# Patient Record
Sex: FEMALE | Race: WHITE | NOT HISPANIC OR LATINO | Employment: OTHER | URBAN - METROPOLITAN AREA
[De-identification: names, ages, dates, MRNs, and addresses within clinical notes are randomized per-mention and may not be internally consistent; named-entity substitution may affect disease eponyms.]

---

## 2017-01-12 ENCOUNTER — TELEPHONE (OUTPATIENT)
Dept: FAMILY MEDICINE | Facility: CLINIC | Age: 77
End: 2017-01-12

## 2017-01-12 NOTE — TELEPHONE ENCOUNTER
Reason for Call:  Form, our goal is to have forms completed with 72 hours, however, some forms may require a visit or additional information.    Type of letter, form or note:  medical    Who is the form from?: [facility]    Where did the form come from: form was faxed in    What clinic location was the form placed at?: [facility]    Where the form was placed: 's Box: Bethanie Finnegan MD    What number is listed as a contact on the form?: Fax # [phone]       Additional comments: Mohinder Meredith (Initial Date: 1/11/17)    Call taken on 1/12/2017 at 9:24 AM by Gonzalez Vidales

## 2017-01-12 NOTE — TELEPHONE ENCOUNTER
Form reviewed by Bethanie Finnegan MD and per her request the patient is overdue for an office visit and she needs to schedule an appointment   Call to patient to notify her of this information and she noted that her current  PCP is Ananya Mclean, with [facility]  Advised patient to contact [facility] to update them with her current PCP and the form was faxed back to [facility] with this updated information as well

## 2017-01-23 NOTE — TELEPHONE ENCOUNTER
divalproex (DEPAKOTE ER) 500 MG 24 hr tablet  Last Written Prescription Date: 8/8/16  Last Fill Quantity: 180, # refills: 0  Last Office Visit with [facility], [facility] or [facility] prescribing provider: 5/2/16    Routing to PCP-she is overdue for 6 months visit for Seizure  Mara Yung RN- Triage [employer]         ALT       44   4/12/2016  WBC      8.9   4/12/2016  RBC     3.99   4/12/2016  HGB     10.4   4/12/2016  HCT     36.0   4/12/2016  No components found with this name: mct  MCV       90   4/12/2016  MCH     26.1   4/12/2016  MCHC     28.9   4/12/2016  RDW     15.8   4/12/2016  PLT      287   4/12/2016  TSH   Date Value Ref Range Status   09/06/2016 1.86 0.40 - 5.00 mU/L Final     CREATININE   Date Value Ref Range Status   04/12/2016 0.83 0.52 - 1.04 mg/dL Final       Drug Levels  Depakote:   Results for orders placed or performed in visit on 09/06/16   Valproic acid   Result Value Ref Range    Valproic Acid Level 64 50 - 100 mg/L     Dilantin: No results found for this or any previous visit.  Gabitril: No results found for this or any previous visit.  Tegretol: No results found for this or any previous visit.  Zonegran: No results found for this or any previous visit.

## 2017-01-24 DIAGNOSIS — E11.21 TYPE 2 DIABETES MELLITUS WITH DIABETIC NEPHROPATHY (H): Primary | ICD-10-CM

## 2017-01-24 RX ORDER — DIVALPROEX SODIUM 500 MG/1
TABLET, EXTENDED RELEASE ORAL
Qty: 180 TABLET | Refills: 0
Start: 2017-01-24

## 2017-01-24 NOTE — TELEPHONE ENCOUNTER
Test strips      Last Written Prescription Date: 8/25/2015  Last Fill Quantity: 200,  # refills: 3   Last Office Visit with [facility], [facility] or [facility] prescribing provider: 05/02/2016      30 day supply given.  Due for 6 month DM follow up  Autumn Ortez RN  Triage [employer]

## 2017-02-14 ENCOUNTER — OFFICE VISIT (OUTPATIENT)
Dept: FAMILY MEDICINE | Facility: CLINIC | Age: 77
End: 2017-02-14
Payer: MEDICARE

## 2017-02-14 VITALS
DIASTOLIC BLOOD PRESSURE: 62 MMHG | HEIGHT: 63 IN | TEMPERATURE: 96.9 F | OXYGEN SATURATION: 93 % | WEIGHT: 202 LBS | SYSTOLIC BLOOD PRESSURE: 114 MMHG | BODY MASS INDEX: 35.79 KG/M2 | RESPIRATION RATE: 12 BRPM | HEART RATE: 95 BPM

## 2017-02-14 DIAGNOSIS — E11.21 TYPE 2 DIABETES MELLITUS WITH DIABETIC NEPHROPATHY, WITHOUT LONG-TERM CURRENT USE OF INSULIN (H): ICD-10-CM

## 2017-02-14 DIAGNOSIS — E78.2 MIXED HYPERLIPIDEMIA: ICD-10-CM

## 2017-02-14 DIAGNOSIS — F31.76 BIPOLAR DISORDER, IN FULL REMISSION, MOST RECENT EPISODE DEPRESSED (H): Chronic | ICD-10-CM

## 2017-02-14 DIAGNOSIS — M62.81 MUSCLE WEAKNESS (GENERALIZED): Primary | ICD-10-CM

## 2017-02-14 DIAGNOSIS — Z13.89 SCREENING FOR DIABETIC PERIPHERAL NEUROPATHY: ICD-10-CM

## 2017-02-14 DIAGNOSIS — Z79.899 ENCOUNTER FOR LONG-TERM (CURRENT) USE OF MEDICATIONS: ICD-10-CM

## 2017-02-14 DIAGNOSIS — E66.09 NON MORBID OBESITY DUE TO EXCESS CALORIES: ICD-10-CM

## 2017-02-14 DIAGNOSIS — J41.1 MUCOPURULENT CHRONIC BRONCHITIS (H): ICD-10-CM

## 2017-02-14 DIAGNOSIS — D50.0 IRON DEFICIENCY ANEMIA DUE TO CHRONIC BLOOD LOSS: ICD-10-CM

## 2017-02-14 DIAGNOSIS — E55.9 VITAMIN D DEFICIENCY: ICD-10-CM

## 2017-02-14 DIAGNOSIS — N18.30 CKD (CHRONIC KIDNEY DISEASE) STAGE 3, GFR 30-59 ML/MIN (H): ICD-10-CM

## 2017-02-14 DIAGNOSIS — F32.5 MAJOR DEPRESSION IN COMPLETE REMISSION (H): ICD-10-CM

## 2017-02-14 DIAGNOSIS — R74.8 ELEVATED LIVER ENZYMES: ICD-10-CM

## 2017-02-14 DIAGNOSIS — Z79.52 STEROID DEPENDENT FOR ADRENAL SUPRESSION: ICD-10-CM

## 2017-02-14 LAB
HBA1C MFR BLD: 6 % (ref 4.3–6)
HGB BLD-MCNC: 11.4 G/DL (ref 11.7–15.7)

## 2017-02-14 PROCEDURE — 82043 UR ALBUMIN QUANTITATIVE: CPT | Performed by: FAMILY MEDICINE

## 2017-02-14 PROCEDURE — 85018 HEMOGLOBIN: CPT | Performed by: FAMILY MEDICINE

## 2017-02-14 PROCEDURE — 80053 COMPREHEN METABOLIC PANEL: CPT | Performed by: FAMILY MEDICINE

## 2017-02-14 PROCEDURE — 36415 COLL VENOUS BLD VENIPUNCTURE: CPT | Performed by: FAMILY MEDICINE

## 2017-02-14 PROCEDURE — 82306 VITAMIN D 25 HYDROXY: CPT | Performed by: FAMILY MEDICINE

## 2017-02-14 PROCEDURE — 99215 OFFICE O/P EST HI 40 MIN: CPT | Performed by: FAMILY MEDICINE

## 2017-02-14 PROCEDURE — 83036 HEMOGLOBIN GLYCOSYLATED A1C: CPT | Performed by: FAMILY MEDICINE

## 2017-02-14 PROCEDURE — 99207 C FOOT EXAM  NO CHARGE: CPT | Performed by: FAMILY MEDICINE

## 2017-02-14 RX ORDER — PREDNISONE 5 MG/1
TABLET ORAL
Qty: 60 TABLET | Refills: 0 | Status: ON HOLD | OUTPATIENT
Start: 2017-02-14 | End: 2021-07-20

## 2017-02-14 NOTE — LETTER
My COPD Action Plan   Name: Tonya Yang    YOB: 1940    Date: 2/14/2017    My doctor: No primary care provider on file.    My clinic: [address] [id number] [phone]   My COPD Medicine:       My Controller Medications: Albuterol (Proair/Ventolin/Proventolin)     My Rescue Medication:  Albuterol     Use of Oxygen:  Oxygen Not Prescribed   My COPD Severity: Mild = FeV1 > 80%        GREEN ZONE       Doing well today      Usual level of activity and exercise    Usual amount of cough and mucus    No shortness of breath    Can think clearly    Sleep well at night    Feel like eating Actions:      Take daily medicines    Use oxygen as prescribed    Follow regular exercise and diet plan    Avoid cigarette smoke and other irritants that harm the lungs             YELLOW ZONE          Having a bad day or flare up      Short of breath more than usual    Change in color or amount of mucus    More coughing or wheezing    Fever or chills    Less energy; trouble completing activities    Trouble thinking or focusing    Using quick relief inhaler or nebulizer more often    Poor sleep; symptoms wake me up    Do not feel like eating Actions:      Take daily medicines    Use quick relief inhaler every 4 hours    If you use oxygen, call you doctor to see if you should adjust your oxygen    Do breathing exercises or other things to help you relax    Let a loved one, friend or neighbor know you are feeling worse    If you have one or more symptoms, consider taking steroids or antibiotics (if they were prescribed)    Call your care team if you have 2 or more symptoms or symptoms don't improve           RED ZONE       Need medical care now      Severe shortness of breath (feel you can't breath)    Not enough breath to do any activity    Trouble walking or talking    Trouble coughing up mucus or blood in mucus    Frequent  coughing    Rescue medicines are not working    Not able to sleep because of breathing    Feel confused or drowsy    Chest pain  Actions:      Call 911 or     Have someone take you to the emergency room if you can't reach your care team        Electronically signed by: Bethanie Finnegan, February 14, 2017     Annual Reminders:  Meet with Care Team, Flu Shot every Fall and Pneumonia Shot at least once.    [facility]/PHARMACY #[id number] - [place], [place] - [address]

## 2017-02-14 NOTE — MR AVS SNAPSHOT
After Visit Summary   2/14/2017    Tonya Yang    MRN: [id number]           Patient Information     Date Of Birth          1940        Visit Information        Provider Department      2/14/2017 2:20 PM Bethanie Finnegan MD [facility]        Today's Diagnoses     Mucopurulent chronic bronchitis (HCC): spirometry 7-18-13  FEV=79; FEV_FVC=65%    -  1    Major depression in complete remission (HCC) on meds         CKD (chronic kidney disease) stage 3, GFR 30-59 ml/min        Bipolar disorder, in full remission, most recent episode depressed (H)        Iron deficiency anemia due to chronic blood loss        Elevated liver enzymes since 12-13 liver abscess I&D        Type 2 diabetes mellitus with diabetic nephropathy, without long-term current use of insulin (H)        Steroid dependent for cochlear hydrops  since 1985         Mixed hyperlipidemia        Non morbid obesity due to excess calories w comorbid ,HTN, CKD, DM         Screening for diabetic peripheral neuropathy        Muscle weakness (generalized)        Vitamin D deficiency        Encounter for long-term (current) use of medications        Meniere's disease (cochlear hydrops), bilateral          Care Instructions    1. TODAY YOU  Went up from 1 lmgm to 10mgm of prednisone    you;'ve been on > 5mgm prednisone since 1990 and over the last 4-5 mo have gone down to 1mgm  And then you felt so sick and weak you took 10mgm this am     Take 5mgm a day  I will give you a mo worth and we will see if we can find out why you are so weak    2. No adrenal otc vitamins  Hold as was recommended by Ananya     4. Go ahead with the B 12 vitamin      5. Continue : Your vitamin D is normal.  Please take 1,000 units in the summer and 2,000 units in the winter to maintain it     6. See me next week re f/u labs     7  You may need a slower taper   ie stay on each dose of steroid for 2 mo              Follow-ups after  "your visit        Follow-up notes from your care team     Return in about 1 week (around 2017) for Lab F/U .      Who to contact     If you have questions or need follow up information about today's clinic visit or your schedule please contact [facility] directly at [phone].  Normal or non-critical lab and imaging results will be communicated to you by MyChart, letter or phone within 4 business days after the clinic has received the results. If you do not hear from us within 7 days, please contact the clinic through [employer] or phone. If you have a critical or abnormal lab result, we will notify you by phone as soon as possible.  Submit refill requests through [employer] or call your pharmacy and they will forward the refill request to us. Please allow 3 business days for your refill to be completed.          Additional Information About Your Visit        MyChart Information     [employer] lets you send messages to your doctor, view your test results, renew your prescriptions, schedule appointments and more. To sign up, go to [email or web address] . Click on \"Log in\" on the left side of the screen, which will take you to the Welcome page. Then click on \"Sign up Now\" on the right side of the page.     You will be asked to enter the access code listed below, as well as some personal information. Please follow the directions to create your username and password.     Your access code is: [email or web address]  Expires: 5/15/2017  3:40 PM     Your access code will  in 90 days. If you need help or a new code, please call your [place] clinic or [phone].        Care EveryWhere ID     This is your Care EveryWhere ID. This could be used by other organizations to access your [place] medical records  [id number]        Your Vitals Were     Pulse Temperature Respirations Height Last Period Pulse Oximetry    95 96.9  F (36.1  C) (Tympanic) 12 5' 3\" (1.6 m) (LMP Unknown) 93%    " Breastfeeding? BMI (Body Mass Index)                No 35.78 kg/m2           Blood Pressure from Last 3 Encounters:   02/14/17 114/62   05/02/16 130/68   04/25/16 130/62    Weight from Last 3 Encounters:   02/14/17 202 lb (91.6 kg)   05/02/16 204 lb (92.5 kg)   04/25/16 204 lb (92.5 kg)              We Performed the Following     Albumin Random Urine Quantitative     Comprehensive metabolic panel     COPD ACTION PLAN     DEPRESSION ACTION PLAN (DAP) Order [[id number]]     FOOT EXAM  NO CHARGE [91630.114]     HEMOGLOBIN A1C     Hemoglobin     Vitamin D Deficiency          Where to get your medicines      These medications were sent to [facility]/pharmacy #[id number] - [place], [place] - [address]  [address], [place] [address]     Phone:  [phone]     predniSONE 5 MG tablet          Primary Care Provider    None Specified       No primary provider on file.        Thank you!     Thank you for choosing [facility]  for your care. Our goal is always to provide you with excellent care. Hearing back from our patients is one way we can continue to improve our services. Please take a few minutes to complete the written survey that you may receive in the mail after your visit with us. Thank you!             Your Updated Medication List - Protect others around you: Learn how to safely use, store and throw away your medicines at www.disposemymeds.org.          This list is accurate as of: 2/14/17  3:40 PM.  Always use your most recent med list.                   Brand Name Dispense Instructions for use    ACE/ARB NOT PRESCRIBED (INTENTIONAL)      1 each continuous prn ACE & ARB not prescribed due to CKD (Chronic Kidney Disease)       ADRENAL PO          atorvastatin 20 MG tablet    LIPITOR    90 tablet    TAKE 1 TABLET (20 MG) BY MOUTH DAILY       DUANE CONTOUR test strip   Generic drug:  blood glucose monitoring     200 strip    USE TO TEST TWICE DAILY       diphenoxylate-atropine 2.5-0.025 MG  per tablet    LOMOTIL    180 tablet    Take 2 tablets by mouth 4 times daily as needed for diarrhea       * divalproex 500 MG 24 hr tablet    DEPAKOTE ER    180 tablet    TAKE 2 TABLETS BY MOUTH DAILY       * divalproex 500 MG 24 hr tablet    DEPAKOTE ER    180 tablet    TAKE 2 TABLETS BY MOUTH DAILY       DULoxetine 60 MG EC capsule    CYMBALTA    90 capsule    TAKE 1 CAPSULE BY MOUTH DAILY       furosemide 20 MG tablet    LASIX    1 tablet    Take 1 tablet (20 mg) by mouth once for 1 dose       glipiZIDE 10 MG tablet    GLUCOTROL    180 tablet    TAKE 1 TABLET (10 MG) BY MOUTH 2 TIMES DAILY (BEFORE MEALS)       * metFORMIN 500 MG 24 hr tablet    GLUCOPHAGE-XR    360 tablet    TAKE 4 TABLETS (2,000 MG) BY MOUTH DAILY (WITH DINNER)       * metFORMIN 500 MG 24 hr tablet    GLUCOPHAGE-XR    360 tablet    TAKE 2 TABLETS BY MOUTH TWICE DAILY WITH MEALS       METHYL B-12 PO      Take by mouth daily as needed       OMEGA-3 FISH OIL PO      Take 1 g by mouth 2 times daily (with meals)       omeprazole 20 MG CR capsule    priLOSEC    180 capsule    TAKE 1 CAPSULE BY MOUTH TWICE DAILY       order for DME     1 each    Equipment being ordered: Long size Lumbar Roll to use when sitting  Fax: [phone]       oxyCODONE-acetaminophen 5-325 MG per tablet    PERCOCET    7 tablet    Take 1 tablet by mouth every 6 hours as needed for moderate to severe pain       * predniSONE 10 MG tablet    DELTASONE    90 tablet    TAKE 1 TABLET BY MOUTH EVERY DAY       * predniSONE 1 MG tablet    DELTASONE    14 tablet    Take 2 tabs(total of 2mg)daily x 7 days       * predniSONE 10 MG tablet    DELTASONE    30 tablet    Take 1 tablet (10 mg) by mouth daily       * predniSONE 5 MG tablet    DELTASONE    60 tablet    Take 1 tab daily for 7 days,       PROBIOTIC & ACIDOPHILUS EX ST PO      Take 1 tablet by mouth One tablet daily       triamterene-hydrochlorothiazide 37.5-25 MG per capsule    DYAZIDE    90 capsule    TAKE 1 CAPSULE BY MOUTH DAILY        VALIUM PO      Take 5 mg by mouth At Bedtime       vitamin D 2000 UNITS Caps      Take 1 tablet by mouth One tablet daily       * Notice:  This list has 8 medication(s) that are the same as other medications prescribed for you. Read the directions carefully, and ask your doctor or other care provider to review them with you.

## 2017-02-14 NOTE — LETTER
My Depression Action Plan  Name: Tonya Yang   Date of Birth 1940  Date: 2/14/2017    My doctor: No primary care provider on file.   My clinic: [address] [address]  [phone]          GREEN    ZONE   Good Control    What it looks like:     Things are going generally well. You have normal up s and down s. You may even feel depressed from time to time, but bad moods usually last less than a day.   What you need to do:  1. Continue to care for yourself (see self care plan)  2. Check your depression survival kit and update it as needed  3. Follow your physician s recommendations including any medication.  4. Do not stop taking medication unless you consult with your physician first.           YELLOW         ZONE Getting Worse    What it looks like:     Depression is starting to interfere with your life.     It may be hard to get out of bed; you may be starting to isolate yourself from others.    Symptoms of depression are starting to last most all day and this has happened for several days.     You may have suicidal thoughts but they are not constant.   What you need to do:     1. Call your care team, your response to treatment will improve if you keep your care team informed of your progress. Yellow periods are signs an adjustment may need to be made.     2. Continue your self-care, even if you have to fake it!    3. Talk to someone in your support network    4. Open up your depression survival kit           RED    ZONE Medical Alert - Get Help    What it looks like:     Depression is seriously interfering with your life.     You may experience these or other symptoms: You can t get out of bed most days, can t work or engage in other necessary activities, you have trouble taking care of basic hygiene, or basic responsibilities, thoughts of suicide or death that will not go away, self-injurious behavior.     What  you need to do:  1. Call your care team and request a same-day appointment. If they are not available (weekends or after hours) call your local crisis line, emergency room or 911.      Electronically signed by: Bethanie Finnegan, February 14, 2017    Depression Self Care Plan / Survival Kit    Self-Care for Depression  Here s the deal. Your body and mind are really not as separate as most people think.  What you do and think affects how you feel and how you feel influences what you do and think. This means if you do things that people who feel good do, it will help you feel better.  Sometimes this is all it takes.  There is also a place for medication and therapy depending on how severe your depression is, so be sure to consult with your medical provider and/ or Behavioral Health Consultant if your symptoms are worsening or not improving.     In order to better manage my stress, I will:    Exercise  Get some form of exercise, every day. This will help reduce pain and release endorphins, the  feel good  chemicals in your brain. This is almost as good as taking antidepressants!  This is not the same as joining a gym and then never going! (they count on that by the way ) It can be as simple as just going for a walk or doing some gardening, anything that will get you moving.      Hygiene   Maintain good hygiene (Get out of bed in the morning, Make your bed, Brush your teeth, Take a shower, and Get dressed like you were going to work, even if you are unemployed).  If your clothes don't fit try to get ones that do.    Diet  I will strive to eat foods that are good for me, drink plenty of water, and avoid excessive sugar, caffeine, alcohol, and other mood-altering substances.  Some foods that are helpful in depression are: complex carbohydrates, B vitamins, flaxseed, fish or fish oil, fresh fruits and vegetables.    Psychotherapy  I agree to participate in Individual Therapy (if recommended).    Medication  If prescribed  medications, I agree to take them.  Missing doses can result in serious side effects.  I understand that drinking alcohol, or other illicit drug use, may cause potential side effects.  I will not stop my medication abruptly without first discussing it with my provider.    Staying Connected With Others  I will stay in touch with my friends, family members, and my primary care provider/team.    Use your imagination  Be creative.  We all have a creative side; it doesn t matter if it s oil painting, sand castles, or mud pies! This will also kick up the endorphins.    Witness Beauty  (AKA stop and smell the roses) Take a look outside, even in mid-winter. Notice colors, textures. Watch the squirrels and birds.     Service to others  Be of service to others.  There is always someone else in need.  By helping others we can  get out of ourselves  and remember the really important things.  This also provides opportunities for practicing all the other parts of the program.    Humor  Laugh and be silly!  Adjust your TV habits for less news and crime-drama and more comedy.    Control your stress  Try breathing deep, massage therapy, biofeedback, and meditation. Find time to relax each day.     My support system    Clinic Contact:  Phone number:    Contact 1:  Phone number:    Contact 2:  Phone number:    [employer]/:  Phone number:    Therapist:  Phone number:    [facility] crisis center:    Phone number:    Other community support:  Phone number:

## 2017-02-14 NOTE — NURSING NOTE
"Chief Complaint   Patient presents with     Recheck Medication     LMP  (LMP Unknown) Estimated body mass index is 36.14 kg/(m^2) as calculated from the following:    Height as of 5/2/16: 5' 3\" (1.6 m).    Weight as of 5/2/16: 204 lb (92.5 kg).  BP completed using cuff size: large   Micadanyelle Cabreraman, CMA    Health Maintenance Due   Topic Date Due     URINE DRUG SCREEN Q1 YR  09/22/1957     OP ANNUAL INR REFERRAL  07/20/2016     INFLUENZA VACCINE (SYSTEM ASSIGNED)  09/01/2016     PHQ-9 Q6 MONTHS (NO INBASKET)  09/22/2016     FALL RISK ASSESSMENT  11/03/2016     FOOT EXAM Q1 YEAR( NO INBASKET)  02/18/2017     COPD ACTION PLAN Q1 YR  02/23/2017     DEPRESSION ACTION PLAN Q1 YR (NO INBASKET)  02/23/2017     EYE EXAM Q1 YEAR( NO INBASKET)  03/01/2017     A1C Q6 MO( NO INBASKET)  03/06/2017     MICROALBUMIN Q1 YEAR( NO INBASKET)  03/14/2017     Health Maintenance reviewed at today's visit patient asked to schedule/complete:   COPD:  Patient declined and Completed at today's visit.  Depression:  Completed at today's visit.  Diabetes:  Completed at today's visit.  Immunizations:  Completed at today's visit.    "

## 2017-02-14 NOTE — PROGRESS NOTES
"  SUBJECTIVE:                                                    Tonya Yang is a 76 year old female who presents to clinic today for the following health issues:    GENERALIZED MUSCLE WEAKNESS AND FATIGUE     -worsening for 2-3 mo   -onset with initial taper of the prednisone and much worse the last wk now down to only 1 mgm of steroid   -many possible causes but is likely the steroid lack in a person dependent on it for 25 yrs     Chronic Pain Follow-Up      Type / Location of Pain: see LBP above, Lt leg pain and Right Wrist  Analgesia/pain control:   Recent changes: worsem she states   Was on 5mgm perc a day And recently rec'd #90 from a former provider outside our group + #30 x2 valium 5mgm   Overall control: Inadequate pain control  Activity level/function:   Daily activities: Unable to perform most daily activities - chores, hobbies, social activities, driving  Work: Unable to work  Adverse effects: No  Adherance  Taking medication as directed? No: see above  Participating in other treatments: no - Refused PT after 4 times in 12-15 as \"hurt\" too much   Risk Factors:  Sleep: Poor  Mood/anxiety: worsened  Recent family or social stressors: none noted  Other aggravating factors: prolonged sitting and sedentary lifestyle  PHQ-9 SCORE 2/18/2016 3/14/2016 3/24/2016   Total Score - - -   Total Score 3 4 4      JANIE-7 SCORE 9/8/2015 3/14/2016   Total Score 3 1         Medication Followup of Prednisone       Taking Medication as prescribed: yes    Down from 5mgm a d by 1 mgm q 3 weeks now at 1mgm but took 10mgm this am     Side Effects: Worsens the DM     Medication Helping Symptoms: Yes-incl LBP and cochlear hydrops for which it was apparently originally Rxed 30 yrs ago     Recent visit with ENT and he was going to taper her to none--is down to 4mgm a day (from 10) Told her to take 5mgm a day       Insomnia       Duration: many yrs but worse with increasing LBP \    Now gone as has been sleeping the last wk " "    Description  Frequency of insomnia: nightly  Time to fall asleep: 1 hour  Middle of night awakening: nightly  Early morning awakening: nightly    Accompanying signs and symptoms: pain, fatigue, recent weight gain and depression/mood changes    History  Similar episodes in past: YES  Previous evaluation/sleep study: no     Precipitating or alleviating factors:  New stressful situation: no   Caffeine intake after lunchtime: no   OTC decongestants: no   Any new medications: no     Therapies tried and outcome: valium in past and recently rec'd 5mgm valium #30 x 2 from outside provider     Sleeping much better with a new \" muscle relaxant\" from same person       LEG EDEMA       Duration: YRS BUT worse now for 2 mo as is more sedentary with her LBP and sits most of the time with legs down     Description (location/character/radiation): lower legs puffy to pitting     Intensity: moderate    Accompanying signs and symptoms: ache    History (similar episodes/previous evaluation): above    Precipitating or alleviating factors: elevation, but doesn;t do it     rec'd one tab of lasix in ED And wraps legs-aces     Therapies tried and outcome: above & told her to elevate above heart      Diabetes Follow-up       Patient is checking blood sugars: once daily. Results are as follows:    am - 100-140 but HgbA1C is > 8    Diabetic concerns: None    Symptoms of hypoglycemia (low blood sugar): none    Paresthesias (numbness or burning in feet) or sores: No    On prednisone --up to 10mgm once and now ENT trying taper, but will keep on 5mgm a d    Date of last diabetic eye exam: 2015--due        Depression and Anxiety Follow-Up    Status since last visit: No change-i remission on meds     Pt states feels positive     Other associated symptoms:None    Complicating factors:     Significant life event: No     Current substance abuse: None    PHQ-9 SCORE 2/18/2016 3/14/2016 3/24/2016   Total Score - - -   Total Score 3 4 4     JANIE-7 SCORE " 9/8/2015 3/14/2016   Total Score 3 1        PHQ-9  English =4     PHQ-9   Any Language     GAD7     COPD Follow-Up    Symptoms are currently: stable    Current fatigue or dyspnea with ambulation: none    Shortness of breath: stable    Increased or change in Cough/Sputum: No    Fever(s): No    Baseline ambulation without stopping to rest NA NA, feet. Able to walk up NA flights of stairs without stopping to rest.    Any ER/UC or hospital admissions since your last visit? No     History   Smoking Status     Former Smoker     Types: Cigarettes     Quit date: 11/15/1987   Smokeless Tobacco     Never Used     Lab Results   Component Value Date    FEV1 1.94 07/18/2013    [id number] 99% 07/18/2013        Chronic Kidney Disease Follow-up      Current NSAID use?  No      Amount of exercise or physical activity: mild walking    Problems taking medications regularly: No    Medication side effects: none    Diet: regular (no restrictions)       Medication Followup of Vitamin D      Taking Medication as prescribed: yes    Side Effects:  None    Medication Helping Symptoms:  Yes    But now new m weakness      ANEMIA       Duration: yrs     Description (location/character/radiation): has been aas low as 10     Intensity:  moderate    Accompanying signs and symptoms: weak     History (similar episodes/previous evaluation): None    Precipitating or alleviating factors: None    Therapies tried and outcome: Fe     NONMORBID OBESITY    -comorbid HTN< CKD, hi LDL ,   -could be cause of fatigue      Hyperlipidemia Follow-Up      Rate your low fat/cholesterol diet?: not monitoring fat    Taking statin?  No    Other lipid medications/supplements?:  none     Hypertension Follow-up      Outpatient blood pressures are not being checked.     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  Additional history: as documented    Labs reviewed in EPIC  Problem list, Medication list, Allergies, and Medical/Social/Surgical histories reviewed in [facility] and updated as appropriate.    ROS:  C: NEGATIVE for fever, chills, change in weight; fatigue and sleepiness , weakness of mm   I: NEGATIVE for worrisome rashes, moles or lesions  E: NEGATIVE for vision changes or irritation  E/M: NEGATIVE for ear, mouth and throat problems  R: NEGATIVE for significant cough or SOB  B: NEGATIVE for masses, tenderness or discharge  CV: NEGATIVE for chest pain, palpitations or peripheral edema  GI: NEGATIVE for nausea, abdominal pain, heartburn, or change in bowel habits  : NEGATIVE for frequency, dysuria, or hematuria  M: NEGATIVE for significant arthralgias or myalgia  N: NEGATIVE for weakness, dizziness or paresthesias  E: NEGATIVE for temperature intolerance, skin/hair changes  H: NEGATIVE for bleeding problems  P: NEGATIVE for changes in mood or affect    OBJECTIVE:                                                    /62 (BP Location: Left arm, Patient Position: Chair, Cuff Size: Adult Regular)  Pulse 95  Temp 96.9  F (36.1  C) (Tympanic)  Resp 12  Ht 5' 3\" (1.6 m)  Wt 202 lb (91.6 kg)  LMP  (LMP Unknown)  SpO2 93%  Breastfeeding? No  BMI 35.78 kg/m2  Body mass index is 35.78 kg/(m^2).  GENERAL: healthy, alert and no distress  EYES: Eyes grossly normal to inspection, PERRL and conjunctivae and sclerae normal  NECK: no adenopathy, no asymmetry, masses, or scars and thyroid normal to palpation  RESP: lungs clear to auscultation - no rales, rhonchi or wheezes  CV: regular rate and rhythm, normal S1 S2, no S3 or S4, no murmur, click or rub, no peripheral edema and peripheral pulses strong  MS: no gross musculoskeletal defects noted, no edema  Diabetic Foot Exam: No sores, ulcers, erthematous pressure areas; good DP& PT pulses and slow to 3 sec  capillary filling time ;normal sensation to monofilament  testing ; " toes are cold and Rt toes blue tinge; mult hmmer toes    SKIN: no suspicious lesions or rashes  NEURO: Normal strength and tone, mentation intact and speech normal  PSYCH: mentation appears normal, affect normal/bright  LYMPH: negative CCOA  nodes and thyroid      Diagnostic Test Results:  Results for orders placed or performed in visit on 02/14/17 (from the past 24 hour(s))   HEMOGLOBIN A1C   Result Value Ref Range    Hemoglobin A1C 6.0 4.3 - 6.0 %   Hemoglobin   Result Value Ref Range    Hemoglobin 11.4 (L) 11.7 - 15.7 g/dL        ASSESSMENT/PLAN:                                                              ICD-10-CM    1. Muscle weakness (generalized) worse since 11-16 w drowsiness M62.81    2. Steroid dependent for cochlear hydrops  since 1985 w withdrawal since 11-16 at down 1 mgm q 3 wks  Z79.52    3. Mucopurulent chronic bronchitis (HCC): spirometry 7-18-13  FEV=79; FEV_FVC=65% J41.1 COPD ACTION PLAN   4. Vitamin D deficiency E55.9 Vitamin D Deficiency   5. Iron deficiency anemia due to chronic blood loss D50.0 Hemoglobin   6. CKD (chronic kidney disease) stage 3, GFR 30-59 ml/min N18.3 Albumin Random Urine Quantitative     Comprehensive metabolic panel   7. Type 2 diabetes mellitus with diabetic nephropathy, without long-term current use of insulin (H) E11.21 FOOT EXAM  NO CHARGE [12087.114]     HEMOGLOBIN A1C     Albumin Random Urine Quantitative     Comprehensive metabolic panel   8. Elevated liver enzymes since 12-13 liver abscess I&D R74.8 Comprehensive metabolic panel   9. Mixed hyperlipidemia E78.2 Comprehensive metabolic panel   10. Non morbid obesity due to excess calories w comorbid ,HTN, CKD, DM  E66.09 Comprehensive metabolic panel   11. Major depression in complete remission (HCC) on meds  F32.5    12. Bipolar disorder, in full remission, most recent episode depressed (H) F31.76    13. Screening for diabetic peripheral neuropathy Z13.89 FOOT EXAM  NO CHARGE [27935.114]   14. Encounter for  long-term (current) use of medications Z79.899 Comprehensive metabolic panel       Patient Instructions   1. TODAY YOU  Went up from 1 lmgm to 10mgm of prednisone    you;'ve been on > 5mgm prednisone since 1990 and over the last 4-5 mo have gone down to 1mgm  And then you felt so sick and weak you took 10mgm this am     Take 5mgm a day  I will give you a mo worth and we will see if we can find out why you are so weak    2. No adrenal otc vitamins  Hold as was recommended by Ananya     4. Go ahead with the B 12 vitamin      5. Continue : Your vitamin D is normal.  Please take 1,000 units in the summer and 2,000 units in the winter to maintain it     6. See me next week re f/u labs     7  You may need a slower taper   ie stay on each dose of steroid for 2 mo      8.  Weight Loss Tips  1. Do not eat after 6 hrs before your expected bedtime  2. Have your heaviest meal for breakfast, a slightly lighter meal at lunch and a snack 6 hrs before bed  3. No sugar/calorie drinks except milk ie no fruit juice, pop, alcohol.  4. Drink milk 30min before meals to decrease your hunger. Also it is excellent as part of your last meal of the day snack  5. Drink lots of water  6. Increase fiber in diet: all bran cereal, salads, popcorn etc  7. Have only one small serving of fruit a day about 1/2 cup (as this is high in sugar)  8. EXERCISE is the bottom line. Without it, you will gain weight even on a low calorie diet. Best if done 2-3X a day as can    Being overweight contributes to high blood pressure and high cholesterol, both of which cause heart attacks, strokes and kidney failure, prediabetes and diabetes, arthritis, and liver disease         Bethanie Finnegan MD  [facility]    Discussed all with pt  And the patient expresses understanding  And son     Time spent with the patient 45 mins, more than 50% in counseling and coordinating care, Re above medical problems.  This time was  spent in review of  "data, the record, and in discussion and counselling  With > 50% face-to-face time  Reviewed entire chart in search of cause of m weakness  :   She has been weak and somnolent -=worsening the las t 2 mo  And sleeps \" all the time\"  For the last wk   This is most likely from weaning from the prednisone on which she has been , usually on 5mgm a d , for her hearing , for 25 yrs    It has not ever helped the hearing so now is will ing to go off it but , tho tapered at 1mgm q 3kwks, this is probably the root of her symptoms as she's been on it so long   Must explore other causes, tho may not be major, but only contributary incl anemia, vit D , CKD, DM  Etc      Weight management plan: Discussed healthy diet and exercise guidelines and patient will follow up in 1 month in clinic to re-evaluate.    Bethanie Finnegan MD      "

## 2017-02-14 NOTE — PATIENT INSTRUCTIONS
1. TODAY YOU  Went up from 1 lmgm to 10mgm of prednisone    you;'ve been on > 5mgm prednisone since 1990 and over the last 4-5 mo have gone down to 1mgm  And then you felt so sick and weak you took 10mgm this am     Take 5mgm a day  I will give you a mo worth and we will see if we can find out why you are so weak    2. No adrenal otc vitamins  Hold as was recommended by Ananya     4. Go ahead with the B 12 vitamin      5. Continue : Your vitamin D is normal.  Please take 1,000 units in the summer and 2,000 units in the winter to maintain it     6. See me next week re f/u labs     7  You may need a slower taper   ie stay on each dose of steroid for 2 mo      8.  Weight Loss Tips  1. Do not eat after 6 hrs before your expected bedtime  2. Have your heaviest meal for breakfast, a slightly lighter meal at lunch and a snack 6 hrs before bed  3. No sugar/calorie drinks except milk ie no fruit juice, pop, alcohol.  4. Drink milk 30min before meals to decrease your hunger. Also it is excellent as part of your last meal of the day snack  5. Drink lots of water  6. Increase fiber in diet: all bran cereal, salads, popcorn etc  7. Have only one small serving of fruit a day about 1/2 cup (as this is high in sugar)  8. EXERCISE is the bottom line. Without it, you will gain weight even on a low calorie diet. Best if done 2-3X a day as can    Being overweight contributes to high blood pressure and high cholesterol, both of which cause heart attacks, strokes and kidney failure, prediabetes and diabetes, arthritis, and liver disease

## 2017-02-15 LAB
ALBUMIN SERPL-MCNC: 3.6 G/DL (ref 3.4–5)
ALP SERPL-CCNC: 85 U/L (ref 40–150)
ALT SERPL W P-5'-P-CCNC: 26 U/L (ref 0–50)
ANION GAP SERPL CALCULATED.3IONS-SCNC: 9 MMOL/L (ref 3–14)
AST SERPL W P-5'-P-CCNC: 25 U/L (ref 0–45)
BILIRUB SERPL-MCNC: 0.4 MG/DL (ref 0.2–1.3)
BUN SERPL-MCNC: 18 MG/DL (ref 7–30)
CALCIUM SERPL-MCNC: 9.6 MG/DL (ref 8.5–10.1)
CHLORIDE SERPL-SCNC: 102 MMOL/L (ref 94–109)
CO2 SERPL-SCNC: 29 MMOL/L (ref 20–32)
CREAT SERPL-MCNC: 0.84 MG/DL (ref 0.52–1.04)
CREAT UR-MCNC: 82 MG/DL
DEPRECATED CALCIDIOL+CALCIFEROL SERPL-MC: 49 UG/L (ref 20–75)
GFR SERPL CREATININE-BSD FRML MDRD: 66 ML/MIN/1.7M2
GLUCOSE SERPL-MCNC: 114 MG/DL (ref 70–99)
MICROALBUMIN UR-MCNC: 10 MG/L
MICROALBUMIN/CREAT UR: 11.94 MG/G CR (ref 0–25)
POTASSIUM SERPL-SCNC: 4.5 MMOL/L (ref 3.4–5.3)
PROT SERPL-MCNC: 7.2 G/DL (ref 6.8–8.8)
SODIUM SERPL-SCNC: 140 MMOL/L (ref 133–144)

## 2017-02-15 ASSESSMENT — PATIENT HEALTH QUESTIONNAIRE - PHQ9: SUM OF ALL RESPONSES TO PHQ QUESTIONS 1-9: 4

## 2017-02-21 ENCOUNTER — OFFICE VISIT (OUTPATIENT)
Dept: FAMILY MEDICINE | Facility: CLINIC | Age: 77
End: 2017-02-21
Payer: MEDICARE

## 2017-02-21 VITALS
HEIGHT: 63 IN | SYSTOLIC BLOOD PRESSURE: 114 MMHG | HEART RATE: 89 BPM | TEMPERATURE: 98 F | DIASTOLIC BLOOD PRESSURE: 64 MMHG | OXYGEN SATURATION: 95 % | BODY MASS INDEX: 36.59 KG/M2 | RESPIRATION RATE: 16 BRPM | WEIGHT: 206.5 LBS

## 2017-02-21 DIAGNOSIS — G89.29 CHRONIC LEFT-SIDED LOW BACK PAIN WITH LEFT-SIDED SCIATICA: ICD-10-CM

## 2017-02-21 DIAGNOSIS — Z79.52 STEROID DEPENDENT FOR ADRENAL SUPRESSION: Primary | ICD-10-CM

## 2017-02-21 DIAGNOSIS — R40.0 SOMNOLENCE: ICD-10-CM

## 2017-02-21 DIAGNOSIS — E11.21 TYPE 2 DIABETES MELLITUS WITH DIABETIC NEPHROPATHY, WITHOUT LONG-TERM CURRENT USE OF INSULIN (H): ICD-10-CM

## 2017-02-21 DIAGNOSIS — D50.0 IRON DEFICIENCY ANEMIA DUE TO CHRONIC BLOOD LOSS: ICD-10-CM

## 2017-02-21 DIAGNOSIS — R53.83 OTHER FATIGUE: ICD-10-CM

## 2017-02-21 DIAGNOSIS — M54.42 CHRONIC LEFT-SIDED LOW BACK PAIN WITH LEFT-SIDED SCIATICA: ICD-10-CM

## 2017-02-21 PROCEDURE — 99214 OFFICE O/P EST MOD 30 MIN: CPT | Performed by: FAMILY MEDICINE

## 2017-02-21 RX ORDER — PREDNISONE 10 MG/1
10 TABLET ORAL DAILY
Qty: 30 TABLET | Refills: 0 | Status: SHIPPED | OUTPATIENT
Start: 2017-02-21 | End: 2017-05-23

## 2017-02-21 NOTE — NURSING NOTE
"Chief Complaint   Patient presents with     RECHECK     labs       Initial /64  Pulse 89  Temp 98  F (36.7  C) (Tympanic)  Resp 16  Ht 5' 3\" (1.6 m)  Wt 206 lb 8 oz (93.7 kg)  LMP  (LMP Unknown)  SpO2 95%  BMI 36.58 kg/m2 Estimated body mass index is 36.58 kg/(m^2) as calculated from the following:    Height as of this encounter: 5' 3\" (1.6 m).    Weight as of this encounter: 206 lb 8 oz (93.7 kg).  Medication Reconciliation: complete   .Jessica BELTRAN      "

## 2017-02-21 NOTE — PATIENT INSTRUCTIONS
1. Try B complex , one a day  Or as you like  To take it     Finish off the B  12  You have 1st     2.  Please eat iron in diet : beets, molasses ,  red meat and greens eg spinach     For your low blood iron   Hgb = 11.4  (>11.5)    3. Continue on the 10mgm of prednisone a day for a mo and see me before  You are out     4. Try not to drink diet pop  BUT  The sugar is fine a t an ave of 135 = good diabetic control     5. [facility]    [address]    [phone]

## 2017-02-21 NOTE — MR AVS SNAPSHOT
After Visit Summary   2/21/2017    Tonya Yang    MRN: [id number]           Patient Information     Date Of Birth          1940        Visit Information        Provider Department      2/21/2017 1:20 PM Bethanie Finnegan MD [facility]        Today's Diagnoses     Steroid dependent for cochlear hydrops  since 1985     -  1      Care Instructions    1. Try B complex , one a day  Or as you like  To take it     Finish off the B  12  You have 1st     2.  Please eat iron in diet : beets, molasses ,  red meat and greens eg spinach     For your low blood iron   Hgb = 11.4  (>11.5)    3. Continue on the 10mgm of prednisone a day for a mo and see me before  You are out     4. Try not to drink diet pop  BUT  The sugar is fine a t an ave of 135 = good diabetic control     5. [facility]    [address]    [phone]          Follow-ups after your visit        Follow-up notes from your care team     Return in about 1 month (around 3/21/2017).      Who to contact     If you have questions or need follow up information about today's clinic visit or your schedule please contact [facility] directly at [phone].  Normal or non-critical lab and imaging results will be communicated to you by Vuzithart, letter or phone within 4 business days after the clinic has received the results. If you do not hear from us within 7 days, please contact the clinic through Vuzithart or phone. If you have a critical or abnormal lab result, we will notify you by phone as soon as possible.  Submit refill requests through Gamisfaction or call your pharmacy and they will forward the refill request to us. Please allow 3 business days for your refill to be completed.          Additional Information About Your Visit        VuzitharWOWIO Information     Gamisfaction lets you send messages to your doctor, view your test results, renew your prescriptions, schedule  "appointments and more. To sign up, go to [email or web address] . Click on \"Log in\" on the left side of the screen, which will take you to the Welcome page. Then click on \"Sign up Now\" on the right side of the page.     You will be asked to enter the access code listed below, as well as some personal information. Please follow the directions to create your username and password.     Your access code is: [email or web address]  Expires: 5/15/2017  3:40 PM     Your access code will  in 90 days. If you need help or a new code, please call your [place] clinic or [phone].        Care EveryWhere ID     This is your Care EveryWhere ID. This could be used by other organizations to access your [place] medical records  [id number]        Your Vitals Were     Pulse Temperature Respirations Height Last Period Pulse Oximetry    89 98  F (36.7  C) (Tympanic) 16 5' 3\" (1.6 m) (LMP Unknown) 95%    BMI (Body Mass Index)                   36.58 kg/m2            Blood Pressure from Last 3 Encounters:   17 114/64   17 114/62   16 130/68    Weight from Last 3 Encounters:   17 206 lb 8 oz (93.7 kg)   17 202 lb (91.6 kg)   16 204 lb (92.5 kg)              Today, you had the following     No orders found for display         Today's Medication Changes          These changes are accurate as of: 17  2:16 PM.  If you have any questions, ask your nurse or doctor.               These medicines have changed or have updated prescriptions.        Dose/Directions    * predniSONE 10 MG tablet   Commonly known as:  DELTASONE   This may have changed:  Another medication with the same name was added. Make sure you understand how and when to take each.   Used for:  Meniere's disease (cochlear hydrops), unspecified laterality   Changed by:  Bethanie Finnegan MD        TAKE 1 TABLET BY MOUTH EVERY DAY   Quantity:  90 tablet   Refills:  0       * predniSONE 1 MG tablet   Commonly known as:  DELTASONE "   This may have changed:  Another medication with the same name was added. Make sure you understand how and when to take each.   Changed by:  Nissen, Rick L, MD        Take 2 tabs(total of 2mg)daily x 7 days   Quantity:  14 tablet   Refills:  0       * predniSONE 10 MG tablet   Commonly known as:  DELTASONE   This may have changed:  Another medication with the same name was added. Make sure you understand how and when to take each.   Used for:  Conductive hearing loss   Changed by:  Nissen, Rick L, MD        Dose:  10 mg   Take 1 tablet (10 mg) by mouth daily   Quantity:  30 tablet   Refills:  4       * predniSONE 5 MG tablet   Commonly known as:  DELTASONE   This may have changed:  Another medication with the same name was added. Make sure you understand how and when to take each.   Changed by:  Bethanie Finnegan MD        Take 1 tab daily for 7 days,   Quantity:  60 tablet   Refills:  0       * predniSONE 10 MG tablet   Commonly known as:  DELTASONE   This may have changed:  You were already taking a medication with the same name, and this prescription was added. Make sure you understand how and when to take each.   Used for:  Steroid dependent for adrenal supression   Changed by:  Bethanie Finnegan MD        Dose:  10 mg   Take 1 tablet (10 mg) by mouth daily   Quantity:  30 tablet   Refills:  0       * Notice:  This list has 5 medication(s) that are the same as other medications prescribed for you. Read the directions carefully, and ask your doctor or other care provider to review them with you.         Where to get your medicines      These medications were sent to [facility]pharmacy #[id number] - [place], [place] - [address]  [address] [place] [address]     Phone:  [phone]     predniSONE 10 MG tablet                Primary Care Provider    None Specified       No primary provider on file.        Thank you!     [facility]. Our goal is always to provide you with excellent care. Hearing back from our patients is one way we can continue to improve our services. Please take a few minutes to complete the written survey that you may receive in the mail after your visit with us. Thank you!             Your Updated Medication List - Protect others around you: Learn how to safely use, store and throw away your medicines at www.disposemymeds.org.          This list is accurate as of: 2/21/17  2:16 PM.  Always use your most recent med list.                   Brand Name Dispense Instructions for use    ACE/ARB NOT PRESCRIBED (INTENTIONAL)      1 each continuous prn ACE & ARB not prescribed due to CKD (Chronic Kidney Disease)       ADRENAL PO          atorvastatin 20 MG tablet    LIPITOR    90 tablet    TAKE 1 TABLET (20 MG) BY MOUTH DAILY       [employer] CONTOUR test strip   Generic drug:  blood glucose monitoring     200 strip    USE TO TEST TWICE DAILY       diphenoxylate-atropine 2.5-0.025 MG per tablet    LOMOTIL    180 tablet    Take 2 tablets by mouth 4 times daily as needed for diarrhea       * divalproex 500 MG 24 hr tablet    DEPAKOTE ER    180 tablet    TAKE 2 TABLETS BY MOUTH DAILY       * divalproex 500 MG 24 hr tablet    DEPAKOTE ER    180 tablet    TAKE 2 TABLETS BY MOUTH DAILY       DULoxetine 60 MG EC capsule    CYMBALTA    90 capsule    TAKE 1 CAPSULE BY MOUTH DAILY       furosemide 20 MG tablet    LASIX    1 tablet    Take 1 tablet (20 mg) by mouth once for 1 dose       glipiZIDE 10 MG tablet    GLUCOTROL    180 tablet    TAKE 1 TABLET (10 MG) BY MOUTH 2 TIMES DAILY (BEFORE MEALS)       * metFORMIN 500 MG 24 hr tablet    GLUCOPHAGE-XR    360 tablet    TAKE 4 TABLETS (2,000 MG) BY MOUTH DAILY (WITH DINNER)       * metFORMIN 500 MG 24 hr tablet    GLUCOPHAGE-XR    360 tablet    TAKE 2 TABLETS BY MOUTH TWICE DAILY WITH MEALS       METHYL B-12 PO      Take by mouth daily as needed       OMEGA-3 FISH OIL PO      Take 1 g by  mouth 2 times daily (with meals)       omeprazole 20 MG CR capsule    priLOSEC    180 capsule    TAKE 1 CAPSULE BY MOUTH TWICE DAILY       order for DME     1 each    Equipment being ordered: Long size Lumbar Roll to use when sitting  Fax: [phone]       oxyCODONE-acetaminophen 5-325 MG per tablet    PERCOCET    7 tablet    Take 1 tablet by mouth every 6 hours as needed for moderate to severe pain       * predniSONE 10 MG tablet    DELTASONE    90 tablet    TAKE 1 TABLET BY MOUTH EVERY DAY       * predniSONE 1 MG tablet    DELTASONE    14 tablet    Take 2 tabs(total of 2mg)daily x 7 days       * predniSONE 10 MG tablet    DELTASONE    30 tablet    Take 1 tablet (10 mg) by mouth daily       * predniSONE 5 MG tablet    DELTASONE    60 tablet    Take 1 tab daily for 7 days,       * predniSONE 10 MG tablet    DELTASONE    30 tablet    Take 1 tablet (10 mg) by mouth daily       PROBIOTIC & ACIDOPHILUS EX ST PO      Take 1 tablet by mouth One tablet daily       triamterene-hydrochlorothiazide 37.5-25 MG per capsule    DYAZIDE    90 capsule    TAKE 1 CAPSULE BY MOUTH DAILY       VALIUM PO      Take 5 mg by mouth At Bedtime       vitamin D 2000 UNITS Caps      Take 1 tablet by mouth One tablet daily       * Notice:  This list has 9 medication(s) that are the same as other medications prescribed for you. Read the directions carefully, and ask your doctor or other care provider to review them with you.

## 2017-02-21 NOTE — PROGRESS NOTES
SUBJECTIVE:                                                    Tonya Yang is a 76 year old female who presents to clinic today for the following health issues:        Pt is here to go over labs from last weeks appointment    Medication Followup of PREDNISONE       Taking Medication as prescribed: yes- back up to 10mgm a day     Does agree has to get off it     On 10mgm a day since 1993  For hydrops per ENT but never helped her sever hearing loss     Was on a decreasing dose since 11-16  And began feeling more & more tired and became somnolent all day      We put her up to 5mgm last wk but she feels better so returned to 10mgm a d     Side Effects:  Fatigue and somnolence off it     Wt gain etc on it     Medication Helping Symptoms:  NO     Back Pain - with LT sciatica      Duration: onset 1992 but re exacerbation in 5-16         Specific cause: none    Description:   Location of pain: low back left  Character of pain: sharp, dull ache, stabbing and constant  Pain radiation:radiates into the left buttocks, radiates into the left leg and radiates into the left foot  New numbness or weakness in legs, not attributed to pain:  no     Intensity: Currently 6/10    History:   Pain interferes with job: Not applicable  History of back problems: no prior back problems  Any previous MRI or X-rays: None  Sees a specialist for back pain:  No  Therapies tried without relief: Physical Therapy    Alleviating factors:   Improved by: Physical Therapy      Precipitating factors:  Worsened by: Lifting, Bending, Standing and Sitting    Functional and Psychosocial Screen (El STarT Back):      Not performed today       Accompanying Signs & Symptoms:  Risk of Fracture:  None  Risk of Cauda Equina:  None  Risk of Infection:  None  Risk of Cancer:  None  Risk of Ankylosing Spondylitis:  Onset at age <35, male, AND morning back stiffness.              ANEMIA-IRON DEFICIENCY     -chronic   -poor diet &mult chronic diseases   -Hgb= 11.4  "so borderline     Diabetes Follow-up      Patient is checking blood sugars: not at all    Diabetic concerns: None     Symptoms of hypoglycemia (low blood sugar): none     Paresthesias (numbness or burning in feet) or sores: No     Date of last diabetic eye exam: 2016     HgbA1C= 6.6 so in control     VITAMIN B 12     - =348 ( 200- 850)   -so is not low         Problem list and histories reviewed & adjusted, as indicated.  Additional history: as documented    Labs reviewed in EPIC  Problem list, Medication list, Allergies, and Medical/Social/Surgical histories reviewed in [facility] and updated as appropriate.    ROS:  C: NEGATIVE for fever, chills, change in weight  I: NEGATIVE for worrisome rashes, moles or lesions  E: NEGATIVE for vision changes or irritation  E/M: NEGATIVE for ear, mouth and throat problems  R: NEGATIVE for significant cough or SOB  B: NEGATIVE for masses, tenderness or discharge  CV: NEGATIVE for chest pain, palpitations or peripheral edema  GI: NEGATIVE for nausea, abdominal pain, heartburn, or change in bowel habits  : NEGATIVE for frequency, dysuria, or hematuria  M: NEGATIVE for significant arthralgias or myalgia  N: NEGATIVE for weakness, dizziness or paresthesias  E: NEGATIVE for temperature intolerance, skin/hair changes  H: NEGATIVE for bleeding problems  P: NEGATIVE for changes in mood or affect    OBJECTIVE:                                                    /64  Pulse 89  Temp 98  F (36.7  C) (Tympanic)  Resp 16  Ht 5' 3\" (1.6 m)  Wt 206 lb 8 oz (93.7 kg)  LMP  (LMP Unknown)  SpO2 95%  BMI 36.58 kg/m2  Body mass index is 36.58 kg/(m^2).  GENERAL: healthy, alert, no distress, obese, frail and elderly  EYES: Eyes grossly normal to inspection, PERRL and conjunctivae and sclerae normal  RESP: lungs clear to auscultation - no rales, rhonchi or wheezes  CV: regular rate and rhythm, normal S1 S2, no S3 or S4, no murmur, click or rub, no peripheral edema and peripheral pulses strong  MS: no " gross musculoskeletal defects noted, no edema  SKIN: no suspicious lesions or rashes  NEURO: Normal strength and tone, mentation intact and speech normal  PSYCH: mentation appears normal, concentration poor, disoriented, inattentive, affect normal/bright and appearance well groomed-- as very [place]    Diagnostic Test Results:  Results for orders placed or performed in visit on 02/14/17   HEMOGLOBIN A1C   Result Value Ref Range    Hemoglobin A1C 6.0 4.3 - 6.0 %   Albumin Random Urine Quantitative   Result Value Ref Range    Creatinine Urine 82 mg/dL    Albumin Urine mg/L 10 mg/L    Albumin Urine mg/g Cr 11.94 0 - 25 mg/g Cr   Hemoglobin   Result Value Ref Range    Hemoglobin 11.4 (L) 11.7 - 15.7 g/dL   Comprehensive metabolic panel   Result Value Ref Range    Sodium 140 133 - 144 mmol/L    Potassium 4.5 3.4 - 5.3 mmol/L    Chloride 102 94 - 109 mmol/L    Carbon Dioxide 29 20 - 32 mmol/L    Anion Gap 9 3 - 14 mmol/L    Glucose 114 (H) 70 - 99 mg/dL    Urea Nitrogen 18 7 - 30 mg/dL    Creatinine 0.84 0.52 - 1.04 mg/dL    GFR Estimate 66 >60 mL/min/1.7m2    GFR Estimate If Black 80 >60 mL/min/1.7m2    Calcium 9.6 8.5 - 10.1 mg/dL    Bilirubin Total 0.4 0.2 - 1.3 mg/dL    Albumin 3.6 3.4 - 5.0 g/dL    Protein Total 7.2 6.8 - 8.8 g/dL    Alkaline Phosphatase 85 40 - 150 U/L    ALT 26 0 - 50 U/L    AST 25 0 - 45 U/L   Vitamin D Deficiency   Result Value Ref Range    Vitamin D Deficiency screening 49 20 - 75 ug/L        ASSESSMENT/PLAN:                                                              ICD-10-CM    1. Steroid dependent for cochlear hydrops  since 1985  Z79.52 predniSONE (DELTASONE) 10 MG tablet   2. Other fatigue R53.83    3. Somnolence R40.0    4. Iron deficiency anemia due to chronic blood loss D50.0    5. Type 2 diabetes mellitus with diabetic nephropathy, without long-term current use of insulin (H) E11.21    6. Chronic left-sided low back pain with left-sided sciatica M54.42     G89.29        Patient  Instructions   1. Try B complex , one a day  Or as you like  To take it     Finish off the B  12  You have 1st     2.  Please eat iron in diet : beets, molasses ,  red meat and greens eg spinach     For your low blood iron   Hgb = 11.4  (>11.5)    3. Continue on the 10mgm of prednisone a day for a mo and see me before  You are out     4. Try not to drink diet pop  BUT  The sugar is fine a t an ave of 135 = good diabetic control     5. [facility]    [address]    [phone]    they cont a MAPPS but do apparently get some pain meds from JOHN Mclean NP  At an outside [facility] clinic   So can not rec any from [facility]     Bethanie Finnegan MD  [facility]

## 2017-03-27 ENCOUNTER — TRANSFERRED RECORDS (OUTPATIENT)
Dept: HEALTH INFORMATION MANAGEMENT | Facility: CLINIC | Age: 77
End: 2017-03-27

## 2017-04-06 ENCOUNTER — TELEPHONE (OUTPATIENT)
Dept: FAMILY MEDICINE | Facility: CLINIC | Age: 77
End: 2017-04-06

## 2017-04-06 NOTE — TELEPHONE ENCOUNTER
Reason for Call:  Form, our goal is to have forms completed with 72 hours, however, some forms may require a visit or additional information.    Type of letter, form or note:  medical    Who is the form from?: Home care    Where did the form come from: form was faxed in    What clinic location was the form placed at?: [facility]    Where the form was placed: 's Box: Bethanie Finnegan MD    What number is listed as a contact on the form?: Fax # [phone]       Additional comments: Yearly Homemaking Visit Complete     Call taken on 4/6/2017 at 4:38 PM by Gonzalez Vidales

## 2017-04-17 ENCOUNTER — TRANSFERRED RECORDS (OUTPATIENT)
Dept: HEALTH INFORMATION MANAGEMENT | Facility: CLINIC | Age: 77
End: 2017-04-17

## 2017-05-12 ENCOUNTER — TRANSFERRED RECORDS (OUTPATIENT)
Dept: HEALTH INFORMATION MANAGEMENT | Facility: CLINIC | Age: 77
End: 2017-05-12

## 2017-05-19 DIAGNOSIS — F31.76 DEPRESSED BIPOLAR I DISORDER IN REMISSION (H): ICD-10-CM

## 2017-05-19 NOTE — TELEPHONE ENCOUNTER
DULOXETINE HCL DR 60 MG CAP  Last Written Prescription Date: 02/03/2017  Last Fill Quantity: 90, # refills: 0  Last Office Visit with G, P or [facility] prescribing provider: 02/21/2017   Next 5 appointments (look out 90 days)     May 23, 2017  1:20 PM CDT   [facility]    [address] [place] [id number] [phone]                   BP Readings from Last 3 Encounters:   02/21/17 114/64   02/14/17 114/62   05/02/16 130/68     Pulse: (for [place])  Creatinine   Date Value Ref Range Status   02/14/2017 0.84 0.52 - 1.04 mg/dL Final   ]    Last PHQ-9 score on record=   PHQ-9 SCORE 2/14/2017   Total Score 4

## 2017-05-22 RX ORDER — DULOXETIN HYDROCHLORIDE 60 MG/1
60 CAPSULE, DELAYED RELEASE ORAL DAILY
Qty: 90 CAPSULE | Refills: 1 | Status: SHIPPED | OUTPATIENT
Start: 2017-05-22 | End: 2017-11-16

## 2017-05-23 ENCOUNTER — OFFICE VISIT (OUTPATIENT)
Dept: FAMILY MEDICINE | Facility: CLINIC | Age: 77
End: 2017-05-23
Payer: MEDICARE

## 2017-05-23 VITALS
BODY MASS INDEX: 36.68 KG/M2 | TEMPERATURE: 95.2 F | HEART RATE: 94 BPM | OXYGEN SATURATION: 94 % | DIASTOLIC BLOOD PRESSURE: 74 MMHG | HEIGHT: 63 IN | WEIGHT: 207 LBS | SYSTOLIC BLOOD PRESSURE: 130 MMHG | RESPIRATION RATE: 12 BRPM

## 2017-05-23 DIAGNOSIS — E11.21 TYPE 2 DIABETES MELLITUS WITH DIABETIC NEPHROPATHY, WITHOUT LONG-TERM CURRENT USE OF INSULIN (H): ICD-10-CM

## 2017-05-23 DIAGNOSIS — F31.76 BIPOLAR DISORDER, IN FULL REMISSION, MOST RECENT EPISODE DEPRESSED (H): Chronic | ICD-10-CM

## 2017-05-23 DIAGNOSIS — F32.5 MAJOR DEPRESSION IN COMPLETE REMISSION (H): ICD-10-CM

## 2017-05-23 DIAGNOSIS — E78.2 MIXED HYPERLIPIDEMIA: ICD-10-CM

## 2017-05-23 DIAGNOSIS — M54.42 CHRONIC LEFT-SIDED LOW BACK PAIN WITH LEFT-SIDED SCIATICA: Primary | ICD-10-CM

## 2017-05-23 DIAGNOSIS — N18.30 CKD (CHRONIC KIDNEY DISEASE) STAGE 3, GFR 30-59 ML/MIN (H): ICD-10-CM

## 2017-05-23 DIAGNOSIS — Z79.52 STEROID DEPENDENT FOR ADRENAL SUPRESSION: ICD-10-CM

## 2017-05-23 DIAGNOSIS — E66.01 MORBID OBESITY DUE TO EXCESS CALORIES (H): ICD-10-CM

## 2017-05-23 DIAGNOSIS — D50.0 IRON DEFICIENCY ANEMIA DUE TO CHRONIC BLOOD LOSS: ICD-10-CM

## 2017-05-23 DIAGNOSIS — J41.1 MUCOPURULENT CHRONIC BRONCHITIS (H): ICD-10-CM

## 2017-05-23 DIAGNOSIS — I10 BENIGN ESSENTIAL HYPERTENSION: ICD-10-CM

## 2017-05-23 DIAGNOSIS — R80.9 MICROALBUMINURIA: ICD-10-CM

## 2017-05-23 DIAGNOSIS — G89.29 CHRONIC LEFT-SIDED LOW BACK PAIN WITH LEFT-SIDED SCIATICA: Primary | ICD-10-CM

## 2017-05-23 PROCEDURE — 99215 OFFICE O/P EST HI 40 MIN: CPT | Performed by: FAMILY MEDICINE

## 2017-05-23 PROCEDURE — 36415 COLL VENOUS BLD VENIPUNCTURE: CPT | Performed by: FAMILY MEDICINE

## 2017-05-23 PROCEDURE — 80061 LIPID PANEL: CPT | Performed by: FAMILY MEDICINE

## 2017-05-23 ASSESSMENT — ANXIETY QUESTIONNAIRES
6. BECOMING EASILY ANNOYED OR IRRITABLE: NOT AT ALL
1. FEELING NERVOUS, ANXIOUS, OR ON EDGE: NOT AT ALL
5. BEING SO RESTLESS THAT IT IS HARD TO SIT STILL: NOT AT ALL
7. FEELING AFRAID AS IF SOMETHING AWFUL MIGHT HAPPEN: SEVERAL DAYS
2. NOT BEING ABLE TO STOP OR CONTROL WORRYING: NOT AT ALL
GAD7 TOTAL SCORE: 1
IF YOU CHECKED OFF ANY PROBLEMS ON THIS QUESTIONNAIRE, HOW DIFFICULT HAVE THESE PROBLEMS MADE IT FOR YOU TO DO YOUR WORK, TAKE CARE OF THINGS AT HOME, OR GET ALONG WITH OTHER PEOPLE: NOT DIFFICULT AT ALL
3. WORRYING TOO MUCH ABOUT DIFFERENT THINGS: NOT AT ALL

## 2017-05-23 ASSESSMENT — PATIENT HEALTH QUESTIONNAIRE - PHQ9: 5. POOR APPETITE OR OVEREATING: NOT AT ALL

## 2017-05-23 NOTE — PROGRESS NOTES
"  SUBJECTIVE:                                                    Tonya Yang is a 76 year old female who presents to clinic today for the following health issues:    Chronic Pain Follow-Up       Type / Location of Pain: LBP and Left Leg Pain since 2015   Analgesia/pain control:       Recent changes:  worse she states   Was on 5mgm perc a day  And recently rec'd #90 from a former provider outside our group + #30 x2 valium 5mgm       Overall control: Inadequate pain control  Activity level/function:      Daily activities:  Unable to perform most daily activities - chores, hobbies, social activities, driving    Work:  Unable to work  Adverse effects:  No  Adherance    Taking medication as directed?  No: see  above    Participating in other treatments: no -  Refused PT after 4 times in 12-15 as \"hurt\" too much   Risk Factors:    Sleep:  Poor    Mood/anxiety:  worsened    Recent family or social stressors:  none noted    Other aggravating factors: prolonged sitting and sedentary lifestyle  PHQ-9 SCORE 2/18/2016 3/14/2016 3/24/2016   Total Score - - -   Total Score 3 4 4     JANIE-7 SCORE 9/8/2015 3/14/2016   Total Score 3 1       saw [facility] ordered MRI which shows 3 discs and stenosis and arthritis   Sees Dr Lopes next wk  Would be artificial discs in 3 levels  Would take 4 hrs   The other option would be spinal nerve stimulator or med delivery system         Amount of exercise or physical activity: mild walking    Problems taking medications regularly: No    Medication side effects: none    Diet: regular (no restrictions)    Medication Followup of Iron Deficiency Anemia      Taking Medication as prescribed: yes    Side Effects:  None    Medication Helping Symptoms:  Yes    Hgb still at 11.4        Medication Followup of Prednisone      Taking Medication as prescribed: yes taking 6 mgm  Would prefer 5 mgm but pt feels better on 6 --probably because helps the COPD and the LBP    Side Effects:  " None    Medication Helping Symptoms:  Yes    STEROID DEPENDENT FOR 20 YRS     Back Pain        Duration: 20 YRS BUT  Worse sind 2015 with sciatica        Specific cause: lifting, turning/bending    Description:   Location of pain: low back bilateral and middle of back bilateral  Character of pain: sharp, stabbing, gnawing and constant  Pain radiation:radiates into the right buttocks, radiates into the right leg, radiates into the left buttocks and radiates into the left leg  New numbness or weakness in legs, not attributed to pain:  no     Intensity: Currently 6/10    History:   Pain interferes with job: Not applicable  History of back problems: no prior back problems  Any previous MRI or X-rays: MRI showing 3 level disc disease and steosis per pt (I have no report) -done per [facility]   Sees a specialist for back pain:  [facility]   Therapies tried without relief: chiropractor, cold, heat, opioids, Physical Therapy and steroid injection    Alleviating factors:   Improved by: opioids  & on cont steroids     Precipitating factors:  Worsened by: Lifting, Bending and Standing    Functional and Psychosocial Screen (El STarT Back):      Not performed today       Accompanying Signs & Symptoms:  Risk of Fracture:  None  Risk of Cauda Equina:  None  Risk of Infection:  None  Risk of Cancer:  None  Risk of Ankylosing Spondylitis:  Onset at age <35, male, AND morning back stiffness. no                      Diabetes Follow-up      Patient is checking blood sugars: rarely.  Results range from 100-120in am     Diabetic concerns: None     Symptoms of hypoglycemia (low blood sugar): none     Paresthesias (numbness or burning in feet) or sores: No     Date of last diabetic eye exam: 2016    Has CKD and microalbuminuria      Mixed Hyperlipidemia Follow-Up      Rate your low fat/cholesterol diet?: good    Taking statin?  Yes, no muscle aches from 20mgm atorvastatin    Other lipid medications/supplements?:  none      Hypertension Follow-up      Outpatient blood pressures are not being checked.     Here < 140/80    Low Salt Diet: not monitoring salt                   Depression and Anxiety Follow-Up      Status since last visit: No change-in remission on meds     Other associated symptoms:None    Complicating factors:     Significant life event: No     Current substance abuse: None    PHQ-9 SCORE 3/24/2016 2/14/2017 5/23/2017   Total Score - - -   Total Score 4 4 3     JANIE-7 SCORE 9/8/2015 3/14/2016 5/23/2017   Total Score 3 1 1        PHQ-9  English      PHQ-9   Any Language     GAD7       COPD Follow-Up      Symptoms are currently: stable    Current fatigue or dyspnea with ambulation: none    Shortness of breath: stable    Increased or change in Cough/Sputum: No    Fever(s): No    Baseline ambulation without stopping to rest 50     feet. Able to walk up 1/2 flights of stairs without stopping to rest.    Any ER/UC or hospital admissions since your last visit? No     History   Smoking Status     Former Smoker     Types: Cigarettes     Quit date: 11/15/1987   Smokeless Tobacco     Never Used     Lab Results   Component Value Date    FEV1 1.94 07/18/2013    PYC2HWG 99% 07/18/2013        MORBID OBESITY    -BMI 35-40   -comorbid DM< HTN, CKD,  - steroid dependence- increases her wt gain and blood glucose          Problem list and histories reviewed & adjusted, as indicated.  Additional history: as documented    Labs reviewed in EPIC    Reviewed and updated as needed this visit by clinical staff  Allergies  Meds  Problems       Reviewed and updated as needed this visit by Provider         ROS:  C: NEGATIVE for fever, chills, change in weight  I: NEGATIVE for worrisome rashes, moles or lesions  E: NEGATIVE for vision changes or irritation  E/M: NEGATIVE for ear, mouth and throat problems  R: NEGATIVE for significant cough or SOB  B: NEGATIVE for masses, tenderness or discharge  CV: NEGATIVE for chest pain, palpitations or  "peripheral edema  GI: NEGATIVE for nausea, abdominal pain, heartburn, or change in bowel habits  : NEGATIVE for frequency, dysuria, or hematuria  MUSCULOSKELETAL:POSITIVE  for , back pain and radicular pain   N: NEGATIVE for weakness, dizziness or paresthesias  E: NEGATIVE for temperature intolerance, skin/hair changes  H: NEGATIVE for bleeding problems  P: NEGATIVE for changes in mood or affect    OBJECTIVE:                                                    /74  Pulse 94  Temp 95.2  F (35.1  C) (Tympanic)  Resp 12  Ht 5' 3\" (1.6 m)  Wt 207 lb (93.9 kg)  LMP  (LMP Unknown)  SpO2 94%  Breastfeeding? No  BMI 36.67 kg/m2  Body mass index is 36.67 kg/(m^2).  GENERAL: healthy, alert, moves slowly from LBP and Lt leg pain = distress, obese, frail, elderly, fatigued and appears older than stated age  EYES: Eyes grossly normal to inspection, PERRL and conjunctivae and sclerae normal  RESP: lungs clear to auscultation - no rales, rhonchi or wheezes  CV: regular rate and rhythm, normal S1 S2, no S3 or S4, no murmur, click or rub, no peripheral edema and peripheral pulses strong  MS: no gross musculoskeletal defects noted, no edema  SKIN: no suspicious lesions or rashes  NEURO: Normal strength and tone, mentation intact and speech normal  PSYCH: mentation appears normal, affect normal/bright, anxious, speech pressured and appearance well groomed; mostly deaf so has difficulty following whats happening and gets upset     Diagnostic Test Results:  Results for orders placed or performed in visit on 05/23/17   Lipid panel reflex to direct LDL   Result Value Ref Range    Cholesterol 154 <200 mg/dL    Triglycerides 305 (H) <150 mg/dL    HDL Cholesterol 43 (L) >49 mg/dL    LDL Cholesterol Calculated 50 <100 mg/dL    Non HDL Cholesterol 111 <130 mg/dL        ASSESSMENT/PLAN:                                                              ICD-10-CM    1. Chronic left-sided low back pain with left-sided sciatica M54.42  "    G89.29    2. Type 2 diabetes mellitus with diabetic nephropathy, without long-term current use of insulin (H) E11.21    3. Morbid obesity due to excess calories (H) BMI > 35 w comorbid CHF, DM, hyperlipidemia, ALEXANDRE, OA, CKD, HTN  E66.01    4. Steroid dependent for cochlear hydrops  since 1985  Z79.52    5. Mixed hyperlipidemia E78.2 Lipid panel reflex to direct LDL   6. Major depression in complete remission (HCC) on meds  F32.5    7. Bipolar disorder, in full remission, most recent episode depressed (H) F31.76    8. Benign essential hypertension I10    9. Mucopurulent chronic bronchitis (HCC): spirometry 7-18-13  FEV=79; FEV_FVC=65% J41.1    10. CKD (chronic kidney disease) stage 3, GFR 30-59 ml/min N18.3    11. Microalbuminuria R80.9    12. Iron deficiency anemia due to chronic blood loss D50.0        Patient Instructions   1.  Weight Loss Tips  1. Do not eat after 6 hrs before your expected bedtime  2. Have your heaviest meal for breakfast, a slightly lighter meal at lunch and a snack 6 hrs before bed  3. No sugar/calorie drinks except milk ie no fruit juice, pop, alcohol.  4. Drink milk 30min before meals to decrease your hunger. Also it is excellent as part of your last meal of the day snack  5. Drink lots of water  6. Increase fiber in diet: all bran cereal, salads, popcorn etc  7. Have only one small serving of fruit a day about 1/2 cup (as this is high in sugar)  8. EXERCISE is the bottom line. Without it, you will gain weight even on a low calorie diet. Best if done 2-3X a day as can    Being overweight contributes to high blood pressure and high cholesterol, both of which cause heart attacks, strokes and kidney failure, prediabetes and diabetes, arthritis, and liver disease     2. You are due for a mammo :  . Schedule your mammo at [facility]  At [address] at [phone]      3. You can have a nerve stimulator implant or a  Medicine delivery system  \\  If Dr Shannon cannot refer you,   Call me     4. Ask Dr Shannon the odds of getting rid of the pain with the surgery     5. See me in 3mo       Bethanie Finnegan MD  [facility]    Weight management plan: Discussed healthy diet and exercise guidelines and patient will follow up in 1 month in clinic to re-evaluate.    I  Explained the treatment and the reason for it   And discussed all with pt  Maia the fact that she is not a good surgical candidate for 4 hrs of back surgery for replacement of 3 discs, and there is no guarantee of a good outcome.  Discussed with them other options to discuss with the surgeon: as above     Time spent with the patient 48mins, more than 50% in counseling and coordinating care, Re above medical problems.and discussion as above   Most care / meds adjustment must wait till she takes care of the overriding concern of her back pain       Bethanie Finnegan MD

## 2017-05-23 NOTE — LETTER
My COPD Action Plan   Name: Tonya Yang    YOB: 1940   Date: 5/24/2017    My doctor: Bethanie Finnegan MD   My clinic: [address] [id number] [phone]  My Controller Medicine: 0   Dose: 0     My Rescue Medicine: Albuterol (Proair/Ventolin/Proventil) inhaler   Dose: rarely     My Flare Up Medicine: Prednisone   Dose: 5-6 mgm a day --dependent for 20yrs   My COPD Severity: Moderate = FeV1 < 79% -50%     Use of Oxygen: Oxygen Not Prescribed         GREEN ZONE       Doing well today      Usual level of activity and exercise    Usual amount of cough and mucus    No shortness of breath    Usual level of health (thinking clearly, sleeping well, feel like eating) Actions:      Take daily medicines    Use oxygen as prescribed    Follow regular exercise and diet plan    Avoid cigarette smoke and other irritants that harm the lungs           YELLOW ZONE          Having a bad day or flare up      Short of breath more than usual    A lot more sputum (mucus) than usual    Sputum looks yellow, green, tan, brown or bloody    More coughing or wheezing    Fever or chills    Less energy; trouble completing activities    Trouble thinking or focusing    Using quick relief inhaler or nebulizer more often    Poor sleep; symptoms wake me up    Do not feel like eating Actions:      Get plenty of rest    Take daily medicines    Use quick relief inhaler every -- hours    If you use oxygen, call you doctor to see if you should adjust your oxygen    Do breathing exercises or other things to help you relax    Let a loved one, friend or neighbor know you are feeling worse    Call your care team if you have 2 or more symptoms.  Start taking steroids or antibiotics if directed by your care team           RED ZONE       Need medical care now      Severe shortness of breath (feel you can't breathe)    Fever, chills    Not enough breath to do any  activity    Trouble coughing up mucus, walking or talking    Blood in mucus    Frequent coughing   Rescue medicines are not working    Not able to sleep because of breathing    Feel confused or drowsy    Chest pain    Actions:      Call your health care team.  If you cannot reach your care team, call 911 or go to the emergency room.        Electronically signed by: Bethanie Finnegan, May 24, 2017  Annual Reminders:  Meet with Care Team, Flu Shot every Fall and Pneumonia Shot at least once  Pharmacy: [id number] - [place] [address]

## 2017-05-23 NOTE — NURSING NOTE
"Chief Complaint   Patient presents with     Recheck Medication     /74  Pulse 94  Temp 95.2  F (35.1  C) (Tympanic)  Resp 12  Ht 5' 3\" (1.6 m)  Wt 207 lb (93.9 kg)  LMP  (LMP Unknown)  SpO2 94%  Breastfeeding? No  BMI 36.67 kg/m2 Estimated body mass index is 36.67 kg/(m^2) as calculated from the following:    Height as of this encounter: 5' 3\" (1.6 m).    Weight as of this encounter: 207 lb (93.9 kg).  BP completed using cuff size: regular   Mica Bojorquez CMA    Health Maintenance Due   Topic Date Due     URINE DRUG SCREEN Q1 YR  09/22/1957     OP ANNUAL INR REFERRAL  07/20/2016     FALL RISK ASSESSMENT  11/03/2016     EYE EXAM Q1 YEAR( NO INBASKET)  03/01/2017     JANIE QUESTIONNAIRE 1 YEAR  03/14/2017     MAMMO SCREEN Q2 YR (SYSTEM ASSIGNED)  04/23/2017     Health Maintenance reviewed at today's visit patient asked to schedule/complete:   Breast Cancer:  Patient agrees to schedule  Diabetes:  Patient agrees to schedule    "

## 2017-05-23 NOTE — MR AVS SNAPSHOT
After Visit Summary   5/23/2017    Tonya Yang    MRN: [id number]           Patient Information     Date Of Birth          1940        Visit Information        Provider Department      5/23/2017 1:20 PM Bethanie Finnegan MD [facility]        Today's Diagnoses     Morbid obesity due to excess calories (H) BMI > 35 w comorbid CHF, DM, hyperlipidemia, ALEXANDRE, OA, CKD, HTN     -  1    Steroid dependent for cochlear hydrops  since 1985         Mixed hyperlipidemia        Type 2 diabetes mellitus with diabetic nephropathy, without long-term current use of insulin (H)        Major depression in complete remission (HCC) on meds         Bipolar disorder, in full remission, most recent episode depressed (H)          Care Instructions    1.  Weight Loss Tips  1. Do not eat after 6 hrs before your expected bedtime  2. Have your heaviest meal for breakfast, a slightly lighter meal at lunch and a snack 6 hrs before bed  3. No sugar/calorie drinks except milk ie no fruit juice, pop, alcohol.  4. Drink milk 30min before meals to decrease your hunger. Also it is excellent as part of your last meal of the day snack  5. Drink lots of water  6. Increase fiber in diet: all bran cereal, salads, popcorn etc  7. Have only one small serving of fruit a day about 1/2 cup (as this is high in sugar)  8. EXERCISE is the bottom line. Without it, you will gain weight even on a low calorie diet. Best if done 2-3X a day as can    Being overweight contributes to high blood pressure and high cholesterol, both of which cause heart attacks, strokes and kidney failure, prediabetes and diabetes, arthritis, and liver disease     2. You are due for a mammo :  . Schedule your mammo at [facility]  At [address] at [phone]      3. You can have a nerve stimulator implant or a  Medicine delivery system  \\  If Dr Shannon cannot refer you,  Call me     4. Ask Dr Shannon the odds of  "getting rid of the pain with the surgery     5. See me in 3mo         Follow-ups after your visit        Follow-up notes from your care team     Return in about 3 months (around 8/23/2017) for Routine Visit.      Who to contact     If you have questions or need follow up information about today's clinic visit or your schedule please contact [facility] directly at [phone].  Normal or non-critical lab and imaging results will be communicated to you by MyChart, letter or phone within 4 business days after the clinic has received the results. If you do not hear from us within 7 days, please contact the clinic through MyChart or phone. If you have a critical or abnormal lab result, we will notify you by phone as soon as possible.  Submit refill requests through [employer] or call your pharmacy and they will forward the refill request to us. Please allow 3 business days for your refill to be completed.          Additional Information About Your Visit        Care EveryWhere ID     This is your Care EveryWhere ID. This could be used by other organizations to access your [facility] medical records  [id number]        Your Vitals Were     Pulse Temperature Respirations Height Last Period Pulse Oximetry    94 95.2  F (35.1  C) (Tympanic) 12 5' 3\" (1.6 m) (LMP Unknown) 94%    Breastfeeding? BMI (Body Mass Index)                No 36.67 kg/m2           Blood Pressure from Last 3 Encounters:   05/23/17 130/74   02/21/17 114/64   02/14/17 114/62    Weight from Last 3 Encounters:   05/23/17 207 lb (93.9 kg)   02/21/17 206 lb 8 oz (93.7 kg)   02/14/17 202 lb (91.6 kg)              We Performed the Following     COPD ACTION PLAN     Lipid panel reflex to direct LDL        Primary Care Provider    None Specified       No primary provider on file.        Thank you!     Thank you for choosing [facility]  for your care. Our goal is always to provide you with excellent care. " Hearing back from our patients is one way we can continue to improve our services. Please take a few minutes to complete the written survey that you may receive in the mail after your visit with us. Thank you!             Your Updated Medication List - Protect others around you: Learn how to safely use, store and throw away your medicines at www.disposemymeds.org.          This list is accurate as of: 5/23/17  2:08 PM.  Always use your most recent med list.                   Brand Name Dispense Instructions for use    ACE/ARB NOT PRESCRIBED (INTENTIONAL)      1 each continuous prn ACE & ARB not prescribed due to CKD (Chronic Kidney Disease)       ADRENAL PO          atorvastatin 20 MG tablet    LIPITOR    90 tablet    TAKE 1 TABLET (20 MG) BY MOUTH DAILY       DUANE CONTOUR test strip   Generic drug:  blood glucose monitoring     200 strip    USE TO TEST TWICE DAILY       diphenoxylate-atropine 2.5-0.025 MG per tablet    LOMOTIL    180 tablet    Take 2 tablets by mouth 4 times daily as needed for diarrhea       * divalproex 500 MG 24 hr tablet    DEPAKOTE ER    180 tablet    TAKE 2 TABLETS BY MOUTH DAILY       * divalproex 500 MG 24 hr tablet    DEPAKOTE ER    180 tablet    TAKE 2 TABLETS BY MOUTH DAILY       DULoxetine 60 MG EC capsule    CYMBALTA    90 capsule    Take 1 capsule (60 mg) by mouth daily       furosemide 20 MG tablet    LASIX    1 tablet    Take 1 tablet (20 mg) by mouth once for 1 dose       glipiZIDE 10 MG tablet    GLUCOTROL    180 tablet    TAKE 1 TABLET (10 MG) BY MOUTH 2 TIMES DAILY (BEFORE MEALS)       * metFORMIN 500 MG 24 hr tablet    GLUCOPHAGE-XR    360 tablet    TAKE 4 TABLETS (2,000 MG) BY MOUTH DAILY (WITH DINNER)       * metFORMIN 500 MG 24 hr tablet    GLUCOPHAGE-XR    360 tablet    TAKE 2 TABLETS BY MOUTH TWICE DAILY WITH MEALS       METHYL B-12 PO      Take by mouth daily as needed       OMEGA-3 FISH OIL PO      Take 1 g by mouth 2 times daily (with meals)       omeprazole 20 MG CR  capsule    priLOSEC    180 capsule    TAKE 1 CAPSULE BY MOUTH TWICE DAILY       order for DME     1 each    Equipment being ordered: Long size Lumbar Roll to use when sitting  Fax: [phone]       oxyCODONE-acetaminophen 5-325 MG per tablet    PERCOCET    7 tablet    Take 1 tablet by mouth every 6 hours as needed for moderate to severe pain       * predniSONE 10 MG tablet    DELTASONE    90 tablet    TAKE 1 TABLET BY MOUTH EVERY DAY       * predniSONE 1 MG tablet    DELTASONE    14 tablet    Take 2 tabs(total of 2mg)daily x 7 days       * predniSONE 5 MG tablet    DELTASONE    60 tablet    Take 1 tab daily for 7 days,       PROBIOTIC & ACIDOPHILUS EX ST PO      Take 1 tablet by mouth One tablet daily       triamterene-hydrochlorothiazide 37.5-25 MG per capsule    DYAZIDE    90 capsule    TAKE 1 CAPSULE BY MOUTH DAILY       VALIUM PO      Take 5 mg by mouth At Bedtime       vitamin D 2000 UNITS Caps      Take 1 tablet by mouth One tablet daily       * Notice:  This list has 7 medication(s) that are the same as other medications prescribed for you. Read the directions carefully, and ask your doctor or other care provider to review them with you.

## 2017-05-23 NOTE — LETTER
"  5/24/2017     Tonya Yang  [address]    [place] [address]    Dear Toyna:    Here are the results of your latest lipid tests:    LDL Cholesterol Calculated   Date Value Ref Range Status   05/23/2017 50 <100 mg/dL Final     Comment:     Desirable:       <100 mg/dl   ]  HDL Cholesterol   Date Value Ref Range Status   05/23/2017 43 (L) >49 mg/dL Final   ]  Triglycerides   Date Value Ref Range Status   05/23/2017 305 (H) <150 mg/dL Final     Comment:     Borderline high:  150-199 mg/dl   High:             200-499 mg/dl   Very high:       >499 mg/dl     ]  Cholesterol   Date Value Ref Range Status   05/23/2017 154 <200 mg/dL Final   ]    LDL is the \"bad\" cholesterol linked to heart disease and stroke.   HDL is the \"good\" choesterol and when it is high, it decreases the risk for above problems.  Triglycerides are quite high.   We should discuss further treatment but will wait till the back is treated  Follow a low-fat, low-cholesterol diet and get regular exercise.  Please feel free to call the clinic if you have any questions.    Sincerely,      Bethanie Finnegan   "

## 2017-05-23 NOTE — PATIENT INSTRUCTIONS
1.  Weight Loss Tips  1. Do not eat after 6 hrs before your expected bedtime  2. Have your heaviest meal for breakfast, a slightly lighter meal at lunch and a snack 6 hrs before bed  3. No sugar/calorie drinks except milk ie no fruit juice, pop, alcohol.  4. Drink milk 30min before meals to decrease your hunger. Also it is excellent as part of your last meal of the day snack  5. Drink lots of water  6. Increase fiber in diet: all bran cereal, salads, popcorn etc  7. Have only one small serving of fruit a day about 1/2 cup (as this is high in sugar)  8. EXERCISE is the bottom line. Without it, you will gain weight even on a low calorie diet. Best if done 2-3X a day as can    Being overweight contributes to high blood pressure and high cholesterol, both of which cause heart attacks, strokes and kidney failure, prediabetes and diabetes, arthritis, and liver disease     2. You are due for a mammo :  . Schedule your mammo at [facility]  At [address] at [phone]      3. You can have a nerve stimulator implant or a  Medicine delivery system  \\  If Dr Shannon cannot refer you,  Call me     4. Ask Dr Shannon the odds of getting rid of the pain with the surgery     5. See me in 3mo

## 2017-05-24 LAB
CHOLEST SERPL-MCNC: 154 MG/DL
HDLC SERPL-MCNC: 43 MG/DL
LDLC SERPL CALC-MCNC: 50 MG/DL
NONHDLC SERPL-MCNC: 111 MG/DL
TRIGL SERPL-MCNC: 305 MG/DL

## 2017-05-24 ASSESSMENT — PATIENT HEALTH QUESTIONNAIRE - PHQ9: SUM OF ALL RESPONSES TO PHQ QUESTIONS 1-9: 3

## 2017-05-24 ASSESSMENT — ANXIETY QUESTIONNAIRES: GAD7 TOTAL SCORE: 1

## 2017-06-11 DIAGNOSIS — E11.21 TYPE 2 DIABETES MELLITUS WITH DIABETIC NEPHROPATHY (H): ICD-10-CM

## 2017-06-13 NOTE — TELEPHONE ENCOUNTER
metFORMIN (GLUCOPHAGE-XR) 500 MG 24 hr tablet         Last Written Prescription Date: 11/28/16  Last Fill Quantity: 360, # refills: 1  Last Office Visit with G, P or [facility] prescribing provider:  5/23/17        BP Readings from Last 3 Encounters:   05/23/17 130/74   02/21/17 114/64   02/14/17 114/62     Lab Results   Component Value Date    MICROL 10 02/14/2017     Lab Results   Component Value Date    UMALCR 11.94 02/14/2017     Creatinine   Date Value Ref Range Status   02/14/2017 0.84 0.52 - 1.04 mg/dL Final   ]  GFR Estimate   Date Value Ref Range Status   02/14/2017 66 >60 mL/min/1.7m2 Final     Comment:     Non  GFR Calc   04/12/2016 68 >60 mL/min/1.7m2 Final     Comment:     Non  GFR Calc   02/18/2016 61 >60 mL/min/1.7m2 Final     GFR Estimate If Black   Date Value Ref Range Status   02/14/2017 80 >60 mL/min/1.7m2 Final     Comment:      GFR Calc   04/12/2016 82 >60 mL/min/1.7m2 Final     Comment:      GFR Calc   02/18/2016 74 >60 mL/min/1.7m2 Final     Lab Results   Component Value Date    CHOL 154 05/23/2017     Lab Results   Component Value Date    HDL 43 05/23/2017     Lab Results   Component Value Date    LDL 50 05/23/2017     Lab Results   Component Value Date    TRIG 305 05/23/2017     Lab Results   Component Value Date    CHOLHDLRATIO 2.8 06/04/2015     Lab Results   Component Value Date    AST 25 02/14/2017     Lab Results   Component Value Date    ALT 26 02/14/2017     Lab Results   Component Value Date    A1C 6.0 02/14/2017    A1C 7.0 09/06/2016    A1C 8.1 02/18/2016    A1C 7.8 09/08/2015    A1C 7.5 06/30/2015     Potassium   Date Value Ref Range Status   02/14/2017 4.5 3.4 - 5.3 mmol/L Final

## 2017-06-14 RX ORDER — METFORMIN HCL 500 MG
TABLET, EXTENDED RELEASE 24 HR ORAL
Qty: 360 TABLET | Refills: 1 | Status: SHIPPED | OUTPATIENT
Start: 2017-06-14 | End: 2021-07-13

## 2017-06-16 DIAGNOSIS — I10 BENIGN ESSENTIAL HYPERTENSION: ICD-10-CM

## 2017-06-19 RX ORDER — TRIAMTERENE AND HYDROCHLOROTHIAZIDE 37.5; 25 MG/1; MG/1
CAPSULE ORAL
Qty: 90 CAPSULE | Refills: 2 | Status: SHIPPED | OUTPATIENT
Start: 2017-06-19 | End: 2018-03-21

## 2017-06-19 NOTE — TELEPHONE ENCOUNTER
dyazide    Last Written Prescription Date: 9-20-16  Last Fill Quantity: 90, # refills: 2  Last Office Visit with [facility], [facility] or [facility] prescribing provider: 5-23-17       Potassium   Date Value Ref Range Status   02/14/2017 4.5 3.4 - 5.3 mmol/L Final     Creatinine   Date Value Ref Range Status   02/14/2017 0.84 0.52 - 1.04 mg/dL Final     BP Readings from Last 3 Encounters:   05/23/17 130/74   02/21/17 114/64   02/14/17 114/62     Prescription approved per [facility] Refill Protocol.

## 2017-08-31 DIAGNOSIS — K21.9 GASTROESOPHAGEAL REFLUX DISEASE WITHOUT ESOPHAGITIS: ICD-10-CM

## 2017-09-11 ENCOUNTER — TELEPHONE (OUTPATIENT)
Dept: AUDIOLOGY | Facility: CLINIC | Age: 77
End: 2017-09-11

## 2017-09-11 NOTE — TELEPHONE ENCOUNTER
Tonya Yang was seen at the [facility] on 9/11/17 for hearing aid services.  She reported her left Claudio i30 BTE was constantly beeping.  Examination confirmed the issue and the device is being sent to the  for repair.  Tonya's insurance will be billed for the repair charges and she will be contacted to  the repaired device.

## 2017-10-02 ENCOUNTER — OFFICE VISIT (OUTPATIENT)
Dept: AUDIOLOGY | Facility: CLINIC | Age: 77
End: 2017-10-02

## 2017-10-02 DIAGNOSIS — H90.3 SENSORY HEARING LOSS, BILATERAL: Primary | ICD-10-CM

## 2017-10-02 NOTE — MR AVS SNAPSHOT
After Visit Summary   10/2/2017    Tonya Yang    MRN: [id number]           Patient Information     Date Of Birth          1940        Visit Information        Provider Department      10/2/2017 2:30 PM Swathi Hedrick AuD M [facility]         Follow-ups after your visit        Your next 10 appointments already scheduled     Jan 08, 2018  1:30 PM CST   (Arrive by 1:15 PM)   UU AUD Hearing Evaluation/Hearing Aid Check with Ashlie Retana [facility] ([facility])    [address] [place] [id number] [phone]           Please see your medical professional for ear cleaning prior to this appointment if you believe wax buildup may be an issue. All patients are required to have a physician's order stating the medical reason for the hearing test. Your doctor can send an electronic order, use their own form or we have provided a form (called Physician's Order for Audiology Services). It states that there is a medical reason for your exam. Without an order you may need to be rescheduled until the order can be obtained.              Who to contact     Please call your clinic at [phone] to:    Ask questions about your health    Make or cancel appointments    Discuss your medicines    Learn about your test results    Speak to your doctor   If you have compliments or concerns about an experience at your clinic, or if you wish to file a complaint, please contact [facility] Patient Relations at [phone] or email us at [email or web address]         Additional Information About Your Visit        Medicalodgeshart Information     Swift Navigation is an electronic gateway that provides easy, online access to your medical records. With Swift Navigation, you can request a clinic appointment, read your test results, renew a prescription or communicate with your care team.     To sign up for Swift Navigation visit the website at  [email or web address]   You will be asked to enter the access code listed below, as well as some personal information. Please follow the directions to create your username and password.     Your access code is: [email or web address]  Expires: 2017  6:31 AM     Your access code will  in 90 days. If you need help or a new code, please contact your [facility] or call [phone] for assistance.        Care EveryWhere ID     This is your Care EveryWhere ID. This could be used by other organizations to access your [place] medical records  [id number]        Your Vitals Were     Last Period                   (LMP Unknown)            Blood Pressure from Last 3 Encounters:   17 130/74   17 114/64   17 114/62    Weight from Last 3 Encounters:   17 93.9 kg (207 lb)   17 93.7 kg (206 lb 8 oz)   17 91.6 kg (202 lb)              Today, you had the following     No orders found for display       Primary Care Provider    None Specified       No primary provider on file.        Equal Access to Services     [facility]: Hadii blake Moreno, matt houser, christen plasencia, irineo hyde . So [facility] [phone].    ATENCIÓN: Si habla español, tiene a rodriguez disposición servicios gratuitos de asistencia lingüística. [facility] [phone].    We comply with applicable federal civil rights laws and [place] laws. We do not discriminate on the basis of race, color, national origin, age, disability, sex, sexual orientation, or gender identity.            Thank you!     Thank you for choosing [facility] AUDIOLOGY  for your care. Our goal is always to provide you with excellent care. Hearing back from our patients is one way we can continue to improve our services. Please take a few minutes to complete the written survey that you may receive in the mail after your visit with us. Thank you!             Your Updated  Medication List - Protect others around you: Learn how to safely use, store and throw away your medicines at www.disposemymeds.org.          This list is accurate as of: 10/2/17  3:08 PM.  Always use your most recent med list.                   Brand Name Dispense Instructions for use Diagnosis    ACE/ARB NOT PRESCRIBED (INTENTIONAL)      1 each continuous prn ACE & ARB not prescribed due to CKD (Chronic Kidney Disease)    Type 2 diabetes, HbA1c goal < 7% (H)       ADRENAL PO           atorvastatin 20 MG tablet    LIPITOR    90 tablet    TAKE 1 TABLET (20 MG) BY MOUTH DAILY    Mixed hyperlipidemia       DUANE CONTOUR test strip   Generic drug:  blood glucose monitoring     200 strip    USE TO TEST TWICE DAILY    Type 2 diabetes mellitus with diabetic nephropathy (H)       diphenoxylate-atropine 2.5-0.025 MG per tablet    LOMOTIL    180 tablet    Take 2 tablets by mouth 4 times daily as needed for diarrhea    Irritable bowel syndrome with diarrhea       * divalproex 500 MG 24 hr tablet    DEPAKOTE ER    180 tablet    TAKE 2 TABLETS BY MOUTH DAILY    Bipolar I disorder, most recent episode depressed, in full remission (H)       * divalproex 500 MG 24 hr tablet    DEPAKOTE ER    180 tablet    TAKE 2 TABLETS BY MOUTH DAILY    Bipolar disorder, in full remission, most recent episode depressed (H)       DULoxetine 60 MG EC capsule    CYMBALTA    90 capsule    Take 1 capsule (60 mg) by mouth daily    Depressed bipolar I disorder in remission (H)       furosemide 20 MG tablet    LASIX    1 tablet    Take 1 tablet (20 mg) by mouth once for 1 dose        glipiZIDE 10 MG tablet    GLUCOTROL    180 tablet    TAKE 1 TABLET (10 MG) BY MOUTH 2 TIMES DAILY (BEFORE MEALS)    Type II diabetes mellitus with nephropathy (H)       * metFORMIN 500 MG 24 hr tablet    GLUCOPHAGE-XR    360 tablet    TAKE 4 TABLETS (2,000 MG) BY MOUTH DAILY (WITH DINNER)    Type II or unspecified type diabetes mellitus without mention of complication, not  stated as uncontrolled       * metFORMIN 500 MG 24 hr tablet    GLUCOPHAGE-XR    360 tablet    TAKE 2 TABLETS BY MOUTH TWICE DAILY WITH MEALS    Type 2 diabetes mellitus with diabetic nephropathy (H)       METHYL B-12 PO      Take by mouth daily as needed        OMEGA-3 FISH OIL PO      Take 1 g by mouth 2 times daily (with meals)        omeprazole 20 MG CR capsule    priLOSEC    180 capsule    TAKE 1 CAPSULE BY MOUTH TWICE DAILY    Gastroesophageal reflux disease without esophagitis       order for DME     1 each    Equipment being ordered: Long size Lumbar Roll to use when sitting  Fax: [phone]    Left-sided low back pain with left-sided sciatica       oxyCODONE-acetaminophen 5-325 MG per tablet    PERCOCET    7 tablet    Take 1 tablet by mouth every 6 hours as needed for moderate to severe pain    Chronic pain syndrome       * predniSONE 10 MG tablet    DELTASONE    90 tablet    TAKE 1 TABLET BY MOUTH EVERY DAY    Meniere's disease (cochlear hydrops), unspecified laterality       * predniSONE 1 MG tablet    DELTASONE    14 tablet    Take 2 tabs(total of 2mg)daily x 7 days        * predniSONE 5 MG tablet    DELTASONE    60 tablet    Take 1 tab daily for 7 days,        PROBIOTIC & ACIDOPHILUS EX ST PO      Take 1 tablet by mouth One tablet daily        triamterene-hydrochlorothiazide 37.5-25 MG per capsule    DYAZIDE    90 capsule    TAKE 1 CAPSULE BY MOUTH DAILY    Benign essential hypertension       VALIUM PO      Take 5 mg by mouth At Bedtime        vitamin D 2000 UNITS Caps      Take 1 tablet by mouth One tablet daily        * Notice:  This list has 7 medication(s) that are the same as other medications prescribed for you. Read the directions carefully, and ask your doctor or other care provider to review them with you.

## 2017-10-02 NOTE — PROGRESS NOTES
AUDIOLOGY REPORT    BACKGROUND INFORMATION: Tonya Yang was seen in Audiology at the [facility] on 10/2/2017 for follow-up.  The patient has been seen previously in this clinic for hearing assessment and results revealed moderate to severe sensorineural hearing loss with 28% word recognition abilities for the left ear and a complete hearing loss for the right ear.  The patient utilizes left hearing amplification fit at another facility and returns today to  the device after repair.    TEST RESULTS AND PROCEDURES: The repaired hearing aid was placed and the patient reports good volume and sound quality. The patient was provided with 4 packs (24 cells) hearing aid batteries that will be billed to her medical insurance today.    SUMMARY AND RECOMMENDATIONS: A hearing aid check was performed today.  Adjustments were made as noted above and the patient will return as needed or at least every 4-6 months for cleaning and assessment of hearing aid.  Please call this clinic with questions regarding today s visit.    Sarah Franklin.  Licensed Audiologist  [place] #[id number]

## 2017-10-24 ENCOUNTER — OFFICE VISIT (OUTPATIENT)
Dept: FAMILY MEDICINE | Facility: CLINIC | Age: 77
End: 2017-10-24
Payer: MEDICARE

## 2017-10-24 VITALS
SYSTOLIC BLOOD PRESSURE: 138 MMHG | RESPIRATION RATE: 20 BRPM | HEART RATE: 86 BPM | DIASTOLIC BLOOD PRESSURE: 78 MMHG | TEMPERATURE: 96.3 F | BODY MASS INDEX: 37.74 KG/M2 | OXYGEN SATURATION: 93 % | WEIGHT: 213 LBS | HEIGHT: 63 IN

## 2017-10-24 DIAGNOSIS — F31.76 BIPOLAR DISORDER, IN FULL REMISSION, MOST RECENT EPISODE DEPRESSED (H): Chronic | ICD-10-CM

## 2017-10-24 DIAGNOSIS — F32.5 MAJOR DEPRESSION IN COMPLETE REMISSION (H): ICD-10-CM

## 2017-10-24 DIAGNOSIS — G89.29 CHRONIC LEFT-SIDED LOW BACK PAIN WITH LEFT-SIDED SCIATICA: Primary | ICD-10-CM

## 2017-10-24 DIAGNOSIS — I10 BENIGN ESSENTIAL HYPERTENSION: ICD-10-CM

## 2017-10-24 DIAGNOSIS — D50.0 IRON DEFICIENCY ANEMIA DUE TO CHRONIC BLOOD LOSS: ICD-10-CM

## 2017-10-24 DIAGNOSIS — E11.21 TYPE 2 DIABETES MELLITUS WITH DIABETIC NEPHROPATHY, WITHOUT LONG-TERM CURRENT USE OF INSULIN (H): ICD-10-CM

## 2017-10-24 DIAGNOSIS — H91.93 BILATERAL DEAFNESS: Chronic | ICD-10-CM

## 2017-10-24 DIAGNOSIS — E78.2 MIXED HYPERLIPIDEMIA: ICD-10-CM

## 2017-10-24 DIAGNOSIS — M54.42 CHRONIC LEFT-SIDED LOW BACK PAIN WITH LEFT-SIDED SCIATICA: Primary | ICD-10-CM

## 2017-10-24 DIAGNOSIS — Z79.52 STEROID DEPENDENT FOR ADRENAL SUPRESSION: ICD-10-CM

## 2017-10-24 PROBLEM — E55.9 VITAMIN D DEFICIENCY: Status: RESOLVED | Noted: 2017-02-14 | Resolved: 2017-10-24

## 2017-10-24 LAB
HBA1C MFR BLD: 6.8 % (ref 4.3–6)
HGB BLD-MCNC: 12.2 G/DL (ref 11.7–15.7)

## 2017-10-24 PROCEDURE — 36415 COLL VENOUS BLD VENIPUNCTURE: CPT | Performed by: FAMILY MEDICINE

## 2017-10-24 PROCEDURE — 80048 BASIC METABOLIC PNL TOTAL CA: CPT | Performed by: FAMILY MEDICINE

## 2017-10-24 PROCEDURE — 83036 HEMOGLOBIN GLYCOSYLATED A1C: CPT | Performed by: FAMILY MEDICINE

## 2017-10-24 PROCEDURE — 85018 HEMOGLOBIN: CPT | Performed by: FAMILY MEDICINE

## 2017-10-24 PROCEDURE — 99213 OFFICE O/P EST LOW 20 MIN: CPT | Performed by: FAMILY MEDICINE

## 2017-10-24 ASSESSMENT — PATIENT HEALTH QUESTIONNAIRE - PHQ9: SUM OF ALL RESPONSES TO PHQ QUESTIONS 1-9: 5

## 2017-10-24 NOTE — PATIENT INSTRUCTIONS
1.  Weight Loss Tips  1. Do not eat after 6 hrs before your expected bedtime  2. Have your heaviest meal for breakfast, a slightly lighter meal at lunch and a snack 6 hrs before bed  3. No sugar/calorie drinks except milk ie no fruit juice, pop, alcohol.  4. Drink milk 30min before meals to decrease your hunger. Also it is excellent as part of your last meal of the day snack  5. Drink lots of water  6. Increase fiber in diet: all bran cereal, salads, popcorn etc  7. Have only one small serving of fruit a day about 1/2 cup (as this is high in sugar)  8. EXERCISE is the bottom line. Without it, you will gain weight even on a low calorie diet. Best if done 2-3X a day as can    Being overweight contributes to high blood pressure and high cholesterol, both of which cause heart attacks, strokes and kidney failure, prediabetes and diabetes, arthritis, and liver disease     2. SEE YOUR EYE DOCTOR !!!    3.  Good luck with the neurostim blocker for the back pain     4. Hopefully you will not need as much oxycodone  As it causes the pain to get worse     5. Continue on 6mgm prednisone but should go back down to 5mgm again after this surgery

## 2017-10-24 NOTE — MR AVS SNAPSHOT
After Visit Summary   10/24/2017    Tonya Yang    MRN: [id number]           Patient Information     Date Of Birth          1940        Visit Information        Provider Department      10/24/2017 1:40 PM Bethanie Finnegan MD [facility]        Today's Diagnoses     Class 2 obesity due to excess calories with serious comorbidity and body mass index (BMI) of 37.0 to 37.9 in adult    -  1    Bilateral deafness        Iron deficiency anemia due to chronic blood loss        Steroid dependent for cochlear hydrops  since 1985         Mixed hyperlipidemia        Benign essential hypertension        Type 2 diabetes mellitus with diabetic nephropathy, without long-term current use of insulin (H)        Major depression in complete remission (HCC) on meds         Bipolar disorder, in full remission, most recent episode depressed (H)          Care Instructions    1.  Weight Loss Tips  1. Do not eat after 6 hrs before your expected bedtime  2. Have your heaviest meal for breakfast, a slightly lighter meal at lunch and a snack 6 hrs before bed  3. No sugar/calorie drinks except milk ie no fruit juice, pop, alcohol.  4. Drink milk 30min before meals to decrease your hunger. Also it is excellent as part of your last meal of the day snack  5. Drink lots of water  6. Increase fiber in diet: all bran cereal, salads, popcorn etc  7. Have only one small serving of fruit a day about 1/2 cup (as this is high in sugar)  8. EXERCISE is the bottom line. Without it, you will gain weight even on a low calorie diet. Best if done 2-3X a day as can    Being overweight contributes to high blood pressure and high cholesterol, both of which cause heart attacks, strokes and kidney failure, prediabetes and diabetes, arthritis, and liver disease     2. SEE YOUR EYE DOCTOR !!!    3.  Good luck with the neurostim blocker for the back pain     4. Hopefully you will not need as much oxycodone  As  it causes the pain to get worse     5. Continue on 6mgm prednisone but should go back down to 5mgm again after this surgery           Follow-ups after your visit        Follow-up notes from your care team     Return in about 3 months (around 1/24/2018).      Your next 10 appointments already scheduled     Jan 08, 2018  1:30 PM CST   (Arrive by 1:15 PM)   U ASHLIE Hearing Evaluation/Hearing Aid Check with Ashlie Retana   [facility] ([facility])    [address] [place] [address]  4th [facility] [id number] [phone]           Please see your medical professional for ear cleaning prior to this appointment if you believe wax buildup may be an issue. All patients are required to have a physician's order stating the medical reason for the hearing test. Your doctor can send an electronic order, use their own form or we have provided a form (called Physician's Order for Audiology Services). It states that there is a medical reason for your exam. Without an order you may need to be rescheduled until the order can be obtained.              Who to contact     If you have questions or need follow up information about today's clinic visit or your schedule please contact [facility] directly at [phone].  Normal or non-critical lab and imaging results will be communicated to you by Click4Ridehart, letter or phone within 4 business days after the clinic has received the results. If you do not hear from us within 7 days, please contact the clinic through [employer] Managed [employer]ic[employer] or phone. If you have a critical or abnormal lab result, we will notify you by phone as soon as possible.  Submit refill requests through [employer] or call your pharmacy and they will forward the refill request to us. Please allow 3 business days for your refill to be completed.          Additional Information About Your Visit        K[employer] Information     [employer] lets you send messages to your doctor, view  "your test results, renew your prescriptions, schedule appointments and more. To sign up, go to [email or web address] . Click on \"Log in\" on the left side of the screen, which will take you to the Welcome page. Then click on \"Sign up Now\" on the right side of the page.     You will be asked to enter the access code listed below, as well as some personal information. Please follow the directions to create your username and password.     Your access code is: [email or web address]  Expires: 2017  6:31 AM     Your access code will  in 90 days. If you need help or a new code, please call your [place] clinic or [phone].        Care EveryWhere ID     This is your Care EveryWhere ID. This could be used by other organizations to access your [place] medical records  [id number]        Your Vitals Were     Pulse Temperature Respirations Height Last Period Pulse Oximetry    86 96.3  F (35.7  C) (Tympanic) 20 5' 3\" (1.6 m) (LMP Unknown) 93%    Breastfeeding? BMI (Body Mass Index)                No 37.73 kg/m2           Blood Pressure from Last 3 Encounters:   10/24/17 138/78   17 130/74   17 114/64    Weight from Last 3 Encounters:   10/24/17 213 lb (96.6 kg)   17 207 lb (93.9 kg)   17 206 lb 8 oz (93.7 kg)              We Performed the Following     Basic metabolic panel     HEMOGLOBIN A1C     Hemoglobin        Primary Care Provider Office Phone # Fax #    Bethanie Finnegan [phone] [address] [facility] [address]        Equal Access to Services     [facility] MICHAEL : Hadii blake Moreno, matt houser, poliybta ectoralmairineo kendrick. So [facility] [phone].    ATENCIÓN: Si habla español, tiene a rodriguez disposición servicios gratuitos de asistencia lingüística. Llame al [phone].    We comply with applicable federal civil rights laws and [place] laws. We do not discriminate on the basis of race, color, " national origin, age, disability, sex, sexual orientation, or gender identity.            Thank you!     Thank you for choosing [facility]  for your care. Our goal is always to provide you with excellent care. Hearing back from our patients is one way we can continue to improve our services. Please take a few minutes to complete the written survey that you may receive in the mail after your visit with us. Thank you!             Your Updated Medication List - Protect others around you: Learn how to safely use, store and throw away your medicines at www.disposemymeds.org.          This list is accurate as of: 10/24/17  2:30 PM.  Always use your most recent med list.                   Brand Name Dispense Instructions for use Diagnosis    ACE/ARB/ARNI NOT PRESCRIBED (INTENTIONAL)      1 each continuous prn ACE & ARB not prescribed due to CKD (Chronic Kidney Disease)    Type 2 diabetes, HbA1c goal < 7% (H)       ADRENAL PO           atorvastatin 20 MG tablet    LIPITOR    90 tablet    TAKE 1 TABLET (20 MG) BY MOUTH DAILY    Mixed hyperlipidemia       DUANE CONTOUR test strip   Generic drug:  blood glucose monitoring     200 strip    USE TO TEST TWICE DAILY    Type 2 diabetes mellitus with diabetic nephropathy (H)       diphenoxylate-atropine 2.5-0.025 MG per tablet    LOMOTIL    180 tablet    Take 2 tablets by mouth 4 times daily as needed for diarrhea    Irritable bowel syndrome with diarrhea       * divalproex 500 MG 24 hr tablet    DEPAKOTE ER    180 tablet    TAKE 2 TABLETS BY MOUTH DAILY    Bipolar I disorder, most recent episode depressed, in full remission (H)       * divalproex 500 MG 24 hr tablet    DEPAKOTE ER    180 tablet    TAKE 2 TABLETS BY MOUTH DAILY    Bipolar disorder, in full remission, most recent episode depressed (H)       DULoxetine 60 MG EC capsule    CYMBALTA    90 capsule    Take 1 capsule (60 mg) by mouth daily    Depressed bipolar I disorder in remission (H)        furosemide 20 MG tablet    LASIX    1 tablet    Take 1 tablet (20 mg) by mouth once for 1 dose        glipiZIDE 10 MG tablet    GLUCOTROL    180 tablet    TAKE 1 TABLET (10 MG) BY MOUTH 2 TIMES DAILY (BEFORE MEALS)    Type II diabetes mellitus with nephropathy (H)       * metFORMIN 500 MG 24 hr tablet    GLUCOPHAGE-XR    360 tablet    TAKE 4 TABLETS (2,000 MG) BY MOUTH DAILY (WITH DINNER)    Type II or unspecified type diabetes mellitus without mention of complication, not stated as uncontrolled       * metFORMIN 500 MG 24 hr tablet    GLUCOPHAGE-XR    360 tablet    TAKE 2 TABLETS BY MOUTH TWICE DAILY WITH MEALS    Type 2 diabetes mellitus with diabetic nephropathy (H)       METHYL B-12 PO      Take by mouth daily as needed        OMEGA-3 FISH OIL PO      Take 1 g by mouth 2 times daily (with meals)        omeprazole 20 MG CR capsule    priLOSEC    180 capsule    TAKE 1 CAPSULE BY MOUTH TWICE DAILY    Gastroesophageal reflux disease without esophagitis       order for DME     1 each    Equipment being ordered: Long size Lumbar Roll to use when sitting  Fax: [phone]    Left-sided low back pain with left-sided sciatica       oxyCODONE-acetaminophen 5-325 MG per tablet    PERCOCET    7 tablet    Take 1 tablet by mouth every 6 hours as needed for moderate to severe pain    Chronic pain syndrome       * predniSONE 10 MG tablet    DELTASONE    90 tablet    TAKE 1 TABLET BY MOUTH EVERY DAY    Meniere's disease (cochlear hydrops), unspecified laterality       * predniSONE 1 MG tablet    DELTASONE    14 tablet    Take 2 tabs(total of 2mg)daily x 7 days        * predniSONE 5 MG tablet    DELTASONE    60 tablet    Take 1 tab daily for 7 days,        PROBIOTIC & ACIDOPHILUS EX ST PO      Take 1 tablet by mouth One tablet daily        triamterene-hydrochlorothiazide 37.5-25 MG per capsule    DYAZIDE    90 capsule    TAKE 1 CAPSULE BY MOUTH DAILY    Benign essential hypertension       VALIUM PO      Take 5 mg by mouth At  Bedtime        vitamin D 2000 UNITS Caps      Take 1 tablet by mouth One tablet daily        * Notice:  This list has 7 medication(s) that are the same as other medications prescribed for you. Read the directions carefully, and ask your doctor or other care provider to review them with you.

## 2017-10-24 NOTE — LETTER
November 3, 2017      Tonya Yang  [address]    [place] [address]        Dear ,    We are writing to inform you of your test results.    1) diabetes is in good control!!!     2) Hgb or blood iron level is only a little low.     It was fun to see you,    Good luck with the new implant for your bad back pain!    Resulted Orders   HEMOGLOBIN A1C   Result Value Ref Range    Hemoglobin A1C 6.8 (H) 4.3 - 6.0 %   Basic metabolic panel   Result Value Ref Range    Sodium 139 133 - 144 mmol/L    Potassium 3.9 3.4 - 5.3 mmol/L    Chloride 101 94 - 109 mmol/L    Carbon Dioxide 28 20 - 32 mmol/L    Anion Gap 10 3 - 14 mmol/L    Glucose 116 (H) 70 - 99 mg/dL    Urea Nitrogen 18 7 - 30 mg/dL    Creatinine 0.84 0.52 - 1.04 mg/dL    GFR Estimate 66 >60 mL/min/1.7m2      Comment:      Non  GFR Calc    GFR Estimate If Black 80 >60 mL/min/1.7m2      Comment:       GFR Calc    Calcium 9.5 8.5 - 10.1 mg/dL   Hemoglobin   Result Value Ref Range    Hemoglobin 12.2 11.7 - 15.7 g/dL       If you have any questions or concerns, please call the clinic at the number listed above.       Sincerely,        Bethanie Finnegan MD

## 2017-10-24 NOTE — PROGRESS NOTES
"  SUBJECTIVE:   Tonya Yang is a 77 year old female who presents to clinic today for the following health issues:    Depression and Anxiety Follow-Up      Status since last visit: No change    Other associated symptoms:None    Complicating factors:     Significant life event: No     Current substance abuse: None    PHQ-9 Score and MyChart F/U Questions 3/24/2016 2/14/2017 5/23/2017   Total Score 4 4 3   Q9: Suicide Ideation Not at all Not at all Not at all     JANIE-7 SCORE 9/8/2015 3/14/2016 5/23/2017   Total Score 3 1 1       PHQ-9  English=5  PHQ-9   Any Language  GAD7  Suicide Assessment Five-step Evaluation and Treatment (SAFE-T)    Chronic Pain Follow-Up       Type / Location of Pain: LBP and Left Leg Pain since 2015 without help with epidural and accupuncture and PT   Analgesia/pain control:       Recent changes:  worse she states   Was on 5mgm perc a day  And recently rec'd #90 from a former provider outside our group + #30 x2 valium 5mgm --conts on 4 oxycodone a day       Overall control: Inadequate pain control  Activity level/function:      Daily activities:  Unable to perform most daily activities - chores, hobbies, social activities, driving    Work:  Unable to work  Adverse effects:  No  Adherance    Taking medication as directed?  No: see  above    Participating in other treatments: no -  Refused PT after 4 times in 12-15 as \"hurt\" too much   Risk Factors:    Sleep:  Poor    Mood/anxiety:  worsened    Recent family or social stressors:  none noted    Other aggravating factors: prolonged sitting and sedentary lifestyle  PHQ-9 SCORE 2/18/2016 3/14/2016 3/24/2016   Total Score - - -   Total Score 3 4 4     JANIE-7 SCORE 9/8/2015 3/14/2016   Total Score 3 1       back surgeon, Radha of the [facility]  Sent her to Dr Montana of [facility] [place]  And he plans to place a trial n blocker in spine next wk     Medication Followup of Iron Deficiency Anemia      Taking Medication as prescribed: " yes    Side Effects:  None    Medication Helping Symptoms:  Yes    Hgb still at 11.4 in 2-14      Medication Followup of Prednisone      Taking Medication as prescribed: yes taking 6 mgm I Would prefer 5 mgm but pt feels better on 6 --probably because helps the COPD and the LBP    Side Effects:  None    Medication Helping Symptoms:  Yes    Back Pain        Duration: 20 YRS BUT  Worse sind 2015 with sciatica        Specific cause: lifting, turning/bending    Description:   Location of pain: low back bilateral and middle of back bilateral  Character of pain: sharp, stabbing, gnawing and constant  Pain radiation:radiates into the right buttocks, radiates into the right leg, radiates into the left buttocks and radiates into the left leg  New numbness or weakness in legs, not attributed to pain:  no     Intensity: Currently 6/10    History:   Pain interferes with job: Not applicable  History of back problems: no prior back problems  Any previous MRI or X-rays: MRI showing 3 level disc disease and steosis per pt (I have no report) -done per [facility]   Sees a specialist for back pain:  [facility]   Therapies tried without relief: chiropractor, cold, heat, opioids, Physical Therapy and steroid injection    Alleviating factors:   Improved by: opioids  & on cont steroids     Precipitating factors:  Worsened by: Lifting, Bending and Standing    Functional and Psychosocial Screen (El STarT Back):      Not performed today   Accompanying Signs & Symptoms:  Risk of Fracture:  None  Risk of Cauda Equina:  None  Risk of Infection:  None  Risk of Cancer:  None  Risk of Ankylosing Spondylitis:  Onset at age <35, male, AND morning back stiffness. no      RX epidural with last in 5-17   Narcotics   Seeing Dr Montana of [facility]   One wk trial next wk  And may get permanent              Diabetes Follow-up      Patient is checking blood sugars: rarely.  Results range from 100-120in am     HgbA1C= 6 in  2-17    Diabetic concerns: None     Symptoms of hypoglycemia (low blood sugar): none     Paresthesias (numbness or burning in feet) or sores: No     Date of last diabetic eye exam: 2016    Has CKD and microalbuminuria      Mixed Hyperlipidemia Follow-Up      Rate your low fat/cholesterol diet?: good    Taking statin?  Yes, no muscle aches from 20mgm atorvastatin    Other lipid medications/supplements?:  none     Hypertension Follow-up      Outpatient blood pressures are not being checked.     Here < 140/80    Low Salt Diet: not monitoring salt     COPD Follow-Up      Symptoms are currently: stable    Current fatigue or dyspnea with ambulation: none    Shortness of breath: stable    Increased or change in Cough/Sputum: No    Fever(s): No    Baseline ambulation without stopping to rest 50     feet. Able to walk up 1/2 flights of stairs without stopping to rest.    Any ER/UC or hospital admissions since your last visit? No     History   Smoking Status     Former Smoker     Types: Cigarettes     Quit date: 11/15/1987   Smokeless Tobacco     Never Used     Lab Results   Component Value Date    FEV1 1.94 07/18/2013    BXV7VHA 99% 07/18/2013        MORBID OBESITY      BMI 35-40     Comorbid DM< HTN, CKD,    Steroid dependence- increases her wt gain and blood glucose        Amount of exercise or physical activity: mild walking    Problems taking medications regularly: No    Medication side effects: none    Diet: regular (no restrictions)    Problem list and histories reviewed & adjusted, as indicated.          Problem list and histories reviewed & adjusted, as indicated.  Additional history: as documented    Labs reviewed in EPIC    Reviewed and updated as needed this visit by clinical staff  Tobacco  Allergies  Meds  Problems  Med Hx  Surg Hx  Fam Hx  Soc Hx        Reviewed and updated as needed this visit by Provider         ROS:  C: NEGATIVE for fever, chills, change in weight  I: NEGATIVE for worrisome rashes,  "moles or lesions  E: NEGATIVE for vision changes or irritation  E/M: NEGATIVE for ear, mouth and throat problems  R: NEGATIVE for significant cough or SOB  B: NEGATIVE for masses, tenderness or discharge  CV: NEGATIVE for chest pain, palpitations or peripheral edema  GI: NEGATIVE for nausea, abdominal pain, heartburn, or change in bowel habits  : NEGATIVE for frequency, dysuria, or hematuria  MUSCULOSKELETAL:POSITIVE  for , back pain and radicular pain   N: NEGATIVE for weakness, dizziness or paresthesias  E: NEGATIVE for temperature intolerance, skin/hair changes  H: NEGATIVE for bleeding problems  P: NEGATIVE for changes in mood or affect    OBJECTIVE:     /78  Pulse 86  Temp 96.3  F (35.7  C) (Tympanic)  Resp 20  Ht 5' 3\" (1.6 m)  Wt 213 lb (96.6 kg)  LMP  (LMP Unknown)  SpO2 93%  Breastfeeding? No  BMI 37.73 kg/m2  Body mass index is 37.73 kg/(m^2).  GENERAL: healthy, alert, no distress, obese, frail, elderly, fatigued and in pain from back   EYES: Eyes grossly normal to inspection, PERRL and conjunctivae and sclerae normal  RESP: lungs clear to auscultation - no rales, rhonchi or wheezes  CV: regular rate and rhythm, normal S1 S2, no S3 or S4, no murmur, click or rub, no peripheral edema and peripheral pulses strong  MS: no gross musculoskeletal defects noted, no edema  SKIN: no suspicious lesions or rashes  NEURO: Normal strength and tone, mentation intact and speech normal  PSYCH: mentation appears normal, concentration poor, inattentive, tangential, affect normal/bright, anxious, fatigued, speech pressured, appearance well groomed and hearing difficulty --hardly hears at all  Son interprets    Diagnostic Test Results:  none     ASSESSMENT/PLAN:               ICD-10-CM    1. Class 2 obesity due to excess calories with serious comorbidity and body mass index (BMI) of 37.0 to 37.9 in adult E66.09     Z68.37    2. Bilateral deafness H91.93    3. Iron deficiency anemia due to chronic blood loss " D50.0 Hemoglobin   4. Steroid dependent for cochlear hydrops  since 1985  Z79.52    5. Mixed hyperlipidemia E78.2    6. Benign essential hypertension I10 Basic metabolic panel     Hemoglobin   7. Type 2 diabetes mellitus with diabetic nephropathy, without long-term current use of insulin (H) E11.21 HEMOGLOBIN A1C     Basic metabolic panel     Hemoglobin   8. 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 Bipolar disorder, in full remission, most recent episode depressed (H) F31.76        Patient Instructions   1.  Weight Loss Tips  1. Do not eat after 6 hrs before your expected bedtime  2. Have your heaviest meal for breakfast, a slightly lighter meal at lunch and a snack 6 hrs before bed  3. No sugar/calorie drinks except milk ie no fruit juice, pop, alcohol.  4. Drink milk 30min before meals to decrease your hunger. Also it is excellent as part of your last meal of the day snack  5. Drink lots of water  6. Increase fiber in diet: all bran cereal, salads, popcorn etc  7. Have only one small serving of fruit a day about 1/2 cup (as this is high in sugar)  8. EXERCISE is the bottom line. Without it, you will gain weight even on a low calorie diet. Best if done 2-3X a day as can    Being overweight contributes to high blood pressure and high cholesterol, both of which cause heart attacks, strokes and kidney failure, prediabetes and diabetes, arthritis, and liver disease     2. SEE YOUR EYE DOCTOR !!!    3.  Good luck with the neurostim blocker for the back pain     4. Hopefully you will not need as much oxycodone  As it causes the pain to get worse     5. Continue on 6mgm prednisone but should go back down to 5mgm again after this surgery       Bethanie Finnegan MD  [facility]    Reviewed entire chart and reassureed pt is cleared for the neurostim implant as above     Weight management plan: Discussed healthy diet and exercise guidelines and patient will follow up in 3  months in clinic to re-evaluate.        Bethanie Finnegan MD

## 2017-10-24 NOTE — NURSING NOTE
"Chief Complaint   Patient presents with     Recheck Medication     /78  Pulse 86  Temp 96.3  F (35.7  C) (Tympanic)  Resp 20  Ht 5' 3\" (1.6 m)  Wt 213 lb (96.6 kg)  LMP  (LMP Unknown)  SpO2 93%  Breastfeeding? No  BMI 37.73 kg/m2 Estimated body mass index is 37.73 kg/(m^2) as calculated from the following:    Height as of this encounter: 5' 3\" (1.6 m).    Weight as of this encounter: 213 lb (96.6 kg).  BP completed using cuff size: regular   Mica Bojorquez CMA    Health Maintenance Due   Topic Date Due     OP ANNUAL INR REFERRAL  07/20/2016     EYE EXAM Q1 YEAR  03/01/2017     A1C Q6 MO  08/14/2017     INFLUENZA VACCINE (SYSTEM ASSIGNED)  09/01/2017     PHQ-9 Q6 MONTHS  11/23/2017     Health Maintenance reviewed at today's visit patient asked to schedule/complete:   Depression:  Patient agrees to schedule  Diabetes:  Patient agrees to schedule    "

## 2017-10-25 LAB
ANION GAP SERPL CALCULATED.3IONS-SCNC: 10 MMOL/L (ref 3–14)
BUN SERPL-MCNC: 18 MG/DL (ref 7–30)
CALCIUM SERPL-MCNC: 9.5 MG/DL (ref 8.5–10.1)
CHLORIDE SERPL-SCNC: 101 MMOL/L (ref 94–109)
CO2 SERPL-SCNC: 28 MMOL/L (ref 20–32)
CREAT SERPL-MCNC: 0.84 MG/DL (ref 0.52–1.04)
GFR SERPL CREATININE-BSD FRML MDRD: 66 ML/MIN/1.7M2
GLUCOSE SERPL-MCNC: 116 MG/DL (ref 70–99)
POTASSIUM SERPL-SCNC: 3.9 MMOL/L (ref 3.4–5.3)
SODIUM SERPL-SCNC: 139 MMOL/L (ref 133–144)

## 2017-10-27 DIAGNOSIS — K21.9 GASTROESOPHAGEAL REFLUX DISEASE WITHOUT ESOPHAGITIS: ICD-10-CM

## 2017-10-27 NOTE — TELEPHONE ENCOUNTER
Reason for Call:  Medication or medication refill:    Do you use a [facility]?  Name of the pharmacy and phone number for the current request:  [facility]    Name of the medication requested: omeprazole (PRILOSEC) 20 MG CR capsule    Other request:     Can we leave a detailed message on this number? YES    Phone number patient can be reached at: Home number on file [phone] (home)    Best Time:     Call taken on 10/27/2017 at 2:25 PM by OPAL MILLS

## 2017-10-30 NOTE — TELEPHONE ENCOUNTER
Prescription approved per [facility] [place] for 12 months of refills since last appointment, which was 10/24/17

## 2017-11-05 DIAGNOSIS — E11.21 TYPE II DIABETES MELLITUS WITH NEPHROPATHY (H): ICD-10-CM

## 2017-11-07 RX ORDER — GLIPIZIDE 10 MG/1
TABLET ORAL
Qty: 180 TABLET | Refills: 1 | Status: SHIPPED | OUTPATIENT
Start: 2017-11-07 | End: 2020-05-31

## 2017-11-16 DIAGNOSIS — F31.76 DEPRESSED BIPOLAR I DISORDER IN REMISSION (H): ICD-10-CM

## 2017-11-17 RX ORDER — DULOXETIN HYDROCHLORIDE 60 MG/1
CAPSULE, DELAYED RELEASE ORAL
Qty: 90 CAPSULE | Refills: 1 | Status: SHIPPED | OUTPATIENT
Start: 2017-11-17 | End: 2023-03-21

## 2017-11-17 NOTE — TELEPHONE ENCOUNTER
PHQ-9 SCORE 2/14/2017 5/23/2017 10/24/2017   Total Score - - -   Total Score 4 3 5     Routing refill request to provider for review/approval because:  PHQ 9 5 or greater

## 2017-12-04 ENCOUNTER — TRANSFERRED RECORDS (OUTPATIENT)
Dept: HEALTH INFORMATION MANAGEMENT | Facility: CLINIC | Age: 77
End: 2017-12-04

## 2018-01-04 ENCOUNTER — MEDICAL CORRESPONDENCE (OUTPATIENT)
Dept: HEALTH INFORMATION MANAGEMENT | Facility: CLINIC | Age: 78
End: 2018-01-04

## 2018-01-12 DIAGNOSIS — F51.01 PRIMARY INSOMNIA: Primary | ICD-10-CM

## 2018-01-15 NOTE — TELEPHONE ENCOUNTER
Requested Prescriptions   Pending Prescriptions Disp Refills     zolpidem (AMBIEN) 5 MG tablet [Pharmacy Med Name: ZOLPIDEM TARTRATE 5 MG TABLET] 30 tablet      Sig: TAKE 1 TABLET BY MOUTH NIGHTLY AS NEEDED FOR SLEEP    There is no refill protocol information for this order        Controlled Substance Refill Request for zolpidem (ambien) 5 mgProblem List Complete:  Yes     checked in past 6 months?  Yes 01/15/18 no concerns   Zolpidem (ambien) 5 mg   Last Written Prescription Date:  3/22/16  Last Fill Quantity: 30,   # refills: 0  Last Office Visit:10/24/17Future Office visit:    Next 5 appointments (look out 90 days)     Jan 23, 2018  1:00 PM CST   [facility]    [address] [place] [id number]   [phone]                   Routing refill request to provider for review/approval because:  Drug not on the [facility], [facility] or  Health refill protocol or controlled substance

## 2018-01-16 RX ORDER — ZOLPIDEM TARTRATE 5 MG/1
TABLET ORAL
Qty: 30 TABLET | Refills: 0 | Status: SHIPPED | OUTPATIENT
Start: 2018-01-16 | End: 2018-03-09

## 2018-01-23 ENCOUNTER — OFFICE VISIT (OUTPATIENT)
Dept: FAMILY MEDICINE | Facility: CLINIC | Age: 78
End: 2018-01-23
Payer: MEDICARE

## 2018-01-23 VITALS
WEIGHT: 209 LBS | OXYGEN SATURATION: 97 % | DIASTOLIC BLOOD PRESSURE: 70 MMHG | HEART RATE: 83 BPM | BODY MASS INDEX: 37.03 KG/M2 | SYSTOLIC BLOOD PRESSURE: 150 MMHG | RESPIRATION RATE: 16 BRPM | HEIGHT: 63 IN

## 2018-01-23 DIAGNOSIS — G89.29 CHRONIC LEFT-SIDED LOW BACK PAIN WITH LEFT-SIDED SCIATICA: Primary | ICD-10-CM

## 2018-01-23 DIAGNOSIS — E11.21 TYPE 2 DIABETES MELLITUS WITH DIABETIC NEPHROPATHY, WITHOUT LONG-TERM CURRENT USE OF INSULIN (H): ICD-10-CM

## 2018-01-23 DIAGNOSIS — M54.42 CHRONIC LEFT-SIDED LOW BACK PAIN WITH LEFT-SIDED SCIATICA: Primary | ICD-10-CM

## 2018-01-23 DIAGNOSIS — J41.1 MUCOPURULENT CHRONIC BRONCHITIS (H): ICD-10-CM

## 2018-01-23 DIAGNOSIS — F31.75 BIPOLAR DISORDER, IN PARTIAL REMISSION, MOST RECENT EPISODE DEPRESSED (H): ICD-10-CM

## 2018-01-23 DIAGNOSIS — I82.402 ACUTE DEEP VEIN THROMBOSIS (DVT) OF LEFT LOWER EXTREMITY, UNSPECIFIED VEIN (H): ICD-10-CM

## 2018-01-23 DIAGNOSIS — H91.93 BILATERAL DEAFNESS: Chronic | ICD-10-CM

## 2018-01-23 PROBLEM — E66.01 MORBID OBESITY DUE TO EXCESS CALORIES (H): Status: RESOLVED | Noted: 2017-05-23 | Resolved: 2018-01-23

## 2018-01-23 PROCEDURE — 99214 OFFICE O/P EST MOD 30 MIN: CPT | Performed by: FAMILY MEDICINE

## 2018-01-23 ASSESSMENT — PATIENT HEALTH QUESTIONNAIRE - PHQ9
SUM OF ALL RESPONSES TO PHQ QUESTIONS 1-9: 7
10. IF YOU CHECKED OFF ANY PROBLEMS, HOW DIFFICULT HAVE THESE PROBLEMS MADE IT FOR YOU TO DO YOUR WORK, TAKE CARE OF THINGS AT HOME, OR GET ALONG WITH OTHER PEOPLE: VERY DIFFICULT
SUM OF ALL RESPONSES TO PHQ QUESTIONS 1-9: 7

## 2018-01-23 NOTE — PATIENT INSTRUCTIONS
1. ICE  decreases inflammation & kills the pain coming from the nerves     WARM SOAKS/PACKS  Relax & loosen up tight muscles to reduce the pain of the        muscle tightness & spasm    2. Please come back in 2 weeks re the back pain     3.  Weight Loss Tips  1. Do not eat after 6 hrs before your expected bedtime  2. Have your heaviest meal for breakfast, a slightly lighter meal at lunch and a snack 6 hrs before bed  3. No sugar/calorie drinks except milk ie no fruit juice, pop, alcohol.  4. Drink milk 30min before meals to decrease your hunger. Also it is excellent as part of your last meal of the day snack  5. Drink lots of water  6. Increase fiber in diet: all bran cereal, salads, popcorn etc  7. Have only one small serving of fruit a day about 1/2 cup (as this is high in sugar)  8. EXERCISE is the bottom line. Without it, you will gain weight even on a low calorie diet. Best if done 2-3X a day as can    Being overweight contributes to high blood pressure and high cholesterol, both of which cause heart attacks, strokes and kidney failure, prediabetes and diabetes, arthritis, and liver disease     4. Ask at [facility] re :    A. Who is her chief provider & the tel no  So I can call     B. When was the last epidural injection and when should she get another     C. When was she supposed to be seen again  Apparently has not been seen since early 2017    D. Can she get a TENS unit from you as had a 2 wk trial with some success     5/ you can try a chiropracter

## 2018-01-23 NOTE — LETTER
My COPD Action Plan     Name: Tonya Yang    YOB: 1940   Date: 1/23/2018    My doctor: Bethanie Finnegan MD   My clinic: [address] [address]  [phone]  My Controller Medicine: Albuterol (Proair/Ventolin/Proventolin)   Dose: --     My Rescue Medicine: Albuterol (Proair/Ventolin/Proventil) inhaler   Dose: --     My Flare Up Medicine: --   Dose: -- FEV-1 (no units)   Date Value   07/18/2013 1.94     FEV1/FVC (no units)   Date Value   07/18/2013 99%      My COPD Severity: Mild = FeV1 > 80%      Use of Oxygen: Oxygen Not Prescribed      Make sure you've had your pneumonia   vaccines.          GREEN ZONE       Doing well today      Usual level of activity and exercise    Usual amount of cough and mucus    No shortness of breath    Usual level of health (thinking clearly, sleeping well, feel like eating) Actions:      Take daily medicines    Use oxygen as prescribed    Follow regular exercise and diet plan    Avoid cigarette smoke and other irritants that harm the lungs           YELLOW ZONE          Having a bad day or flare up      Short of breath more than usual    A lot more sputum (mucus) than usual    Sputum looks yellow, green, tan, brown or bloody    More coughing or wheezing    Fever or chills    Less energy; trouble completing activities    Trouble thinking or focusing    Using quick relief inhaler or nebulizer more often    Poor sleep; symptoms wake me up    Do not feel like eating Actions:      Get plenty of rest    Take daily medicines    Use quick relief inhaler every -- hours    If you use oxygen, call you doctor to see if you should adjust your oxygen    Do breathing exercises or other things to help you relax    Let a loved one, friend or neighbor know you are feeling worse    Call your care team if you have 2 or more symptoms.  Start taking steroids or antibiotics if directed by your care team            RED ZONE       Need medical care now      Severe shortness of breath (feel you can't breathe)    Fever, chills    Not enough breath to do any activity    Trouble coughing up mucus, walking or talking    Blood in mucus    Frequent coughing   Rescue medicines are not working    Not able to sleep because of breathing    Feel confused or drowsy    Chest pain    Actions:      Call your health care team.  If you cannot reach your care team, call 911 or go to the emergency room.        Electronically signed by: Bethanie Finnegan, January 23, 2018  Annual Reminders:  Meet with Care Team, Flu Shot every Fall  Pharmacy: [id number] - [place], [place] - [address] [place] [address] AVE

## 2018-01-23 NOTE — NURSING NOTE
"Chief Complaint   Patient presents with     Back Pain       Initial /70  Pulse 83  Resp 16  Ht 5' 3\" (1.6 m)  Wt 209 lb (94.8 kg)  LMP  (LMP Unknown)  SpO2 97%  BMI 37.02 kg/m2 Estimated body mass index is 37.02 kg/(m^2) as calculated from the following:    Height as of this encounter: 5' 3\" (1.6 m).    Weight as of this encounter: 209 lb (94.8 kg).  Medication Reconciliation: tomas Yi CMA      "

## 2018-01-23 NOTE — LETTER
My Depression Action Plan  Name: Tonya Yang   Date of Birth 1940  Date: 1/23/2018    My doctor: Bethanie Finnegan   My clinic: [address] [address]  [phone]          GREEN    ZONE   Good Control    What it looks like:     Things are going generally well. You have normal up s and down s. You may even feel depressed from time to time, but bad moods usually last less than a day.   What you need to do:  1. Continue to care for yourself (see self care plan)  2. Check your depression survival kit and update it as needed  3. Follow your physician s recommendations including any medication.  4. Do not stop taking medication unless you consult with your physician first.           YELLOW         ZONE Getting Worse    What it looks like:     Depression is starting to interfere with your life.     It may be hard to get out of bed; you may be starting to isolate yourself from others.    Symptoms of depression are starting to last most all day and this has happened for several days.     You may have suicidal thoughts but they are not constant.   What you need to do:     1. Call your care team, your response to treatment will improve if you keep your care team informed of your progress. Yellow periods are signs an adjustment may need to be made.     2. Continue your self-care, even if you have to fake it!    3. Talk to someone in your support network    4. Open up your depression survival kit           RED    ZONE Medical Alert - Get Help    What it looks like:     Depression is seriously interfering with your life.     You may experience these or other symptoms: You can t get out of bed most days, can t work or engage in other necessary activities, you have trouble taking care of basic hygiene, or basic responsibilities, thoughts of suicide or death that will not go away, self-injurious behavior.     What you  need to do:  1. Call your care team and request a same-day appointment. If they are not available (weekends or after hours) call your local crisis line, emergency room or 911.      Electronically signed by: Liz Yi, January 23, 2018    Depression Self Care Plan / Survival Kit    Self-Care for Depression  Here s the deal. Your body and mind are really not as separate as most people think.  What you do and think affects how you feel and how you feel influences what you do and think. This means if you do things that people who feel good do, it will help you feel better.  Sometimes this is all it takes.  There is also a place for medication and therapy depending on how severe your depression is, so be sure to consult with your medical provider and/ or Behavioral Health Consultant if your symptoms are worsening or not improving.     In order to better manage my stress, I will:    Exercise  Get some form of exercise, every day. This will help reduce pain and release endorphins, the  feel good  chemicals in your brain. This is almost as good as taking antidepressants!  This is not the same as joining a gym and then never going! (they count on that by the way ) It can be as simple as just going for a walk or doing some gardening, anything that will get you moving.      Hygiene   Maintain good hygiene (Get out of bed in the morning, Make your bed, Brush your teeth, Take a shower, and Get dressed like you were going to work, even if you are unemployed).  If your clothes don't fit try to get ones that do.    Diet  I will strive to eat foods that are good for me, drink plenty of water, and avoid excessive sugar, caffeine, alcohol, and other mood-altering substances.  Some foods that are helpful in depression are: complex carbohydrates, B vitamins, flaxseed, fish or fish oil, fresh fruits and vegetables.    Psychotherapy  I agree to participate in Individual Therapy (if recommended).    Medication  If prescribed  medications, I agree to take them.  Missing doses can result in serious side effects.  I understand that drinking alcohol, or other illicit drug use, may cause potential side effects.  I will not stop my medication abruptly without first discussing it with my provider.    Staying Connected With Others  I will stay in touch with my friends, family members, and my primary care provider/team.    Use your imagination  Be creative.  We all have a creative side; it doesn t matter if it s oil painting, sand castles, or mud pies! This will also kick up the endorphins.    Witness Beauty  (AKA stop and smell the roses) Take a look outside, even in mid-winter. Notice colors, textures. Watch the squirrels and birds.     Service to others  Be of service to others.  There is always someone else in need.  By helping others we can  get out of ourselves  and remember the really important things.  This also provides opportunities for practicing all the other parts of the program.    Humor  Laugh and be silly!  Adjust your TV habits for less news and crime-drama and more comedy.    Control your stress  Try breathing deep, massage therapy, biofeedback, and meditation. Find time to relax each day.     My support system    Clinic Contact:  Phone number:    Contact 1:  Phone number:    Contact 2:  Phone number:    M[employer]on/:  Phone number:    Therapist:  Phone number:    [facility] crisis center:    Phone number:    Other community support:  Phone number:

## 2018-01-23 NOTE — MR AVS SNAPSHOT
After Visit Summary   1/23/2018    Tonya Yang    MRN: [id number]           Patient Information     Date Of Birth          1940        Visit Information        Provider Department      1/23/2018 1:00 PM Bethanie Finnegan MD [facility]        Today's Diagnoses     Chronic left-sided low back pain with left-sided sciatica    -  1    Bipolar disorder, in partial remission, most recent episode depressed (H)        Type 2 diabetes mellitus with diabetic nephropathy, without long-term current use of insulin (H)        Bilateral deafness        Class 2 obesity due to excess calories with serious comorbidity and body mass index (BMI) of 37.0 to 37.9 in adult        Mucopurulent chronic bronchitis (HCC): spirometry 7-18-13  FEV=79; FEV_FVC=65%          Care Instructions    1. ICE  decreases inflammation & kills the pain coming from the nerves     WARM SOAKS/PACKS  Relax & loosen up tight muscles to reduce the pain of the        muscle tightness & spasm    2. Please come back in 2 weeks re the back pain     3.  Weight Loss Tips  1. Do not eat after 6 hrs before your expected bedtime  2. Have your heaviest meal for breakfast, a slightly lighter meal at lunch and a snack 6 hrs before bed  3. No sugar/calorie drinks except milk ie no fruit juice, pop, alcohol.  4. Drink milk 30min before meals to decrease your hunger. Also it is excellent as part of your last meal of the day snack  5. Drink lots of water  6. Increase fiber in diet: all bran cereal, salads, popcorn etc  7. Have only one small serving of fruit a day about 1/2 cup (as this is high in sugar)  8. EXERCISE is the bottom line. Without it, you will gain weight even on a low calorie diet. Best if done 2-3X a day as can    Being overweight contributes to high blood pressure and high cholesterol, both of which cause heart attacks, strokes and kidney failure, prediabetes and diabetes, arthritis, and liver disease      4. Ask at [facility] re :    A. Who is her chief provider & the tel no  So I can call     B. When was the last epidural injection and when should she get another     C. When was she supposed to be seen again  Apparently has not been seen since early 2017    D. Can she get a TENS unit from you as had a 2 wk trial with some success     5/ you can try a chiropracter     D. Can she get a TENS             Follow-ups after your visit        Follow-up notes from your care team     Return in about 2 weeks (around 2/6/2018) for Routine Visit.      Your next 10 appointments already scheduled     Feb 12, 2018  1:30 PM CST   (Arrive by 1:15 PM)    ASHLIE Hearing Evaluation/Hearing Aid Check with Ashlie Retana   [facility] ([facility])    [address] [place] [id number] [phone]           Please see your medical professional for ear cleaning prior to this appointment if you believe wax buildup may be an issue. All patients are required to have a physician's order stating the medical reason for the hearing test. Your doctor can send an electronic order, use their own form or we have provided a form (called Physician's Order for Audiology Services). It states that there is a medical reason for your exam. Without an order you may need to be rescheduled until the order can be obtained.              Who to contact     If you have questions or need follow up information about today's clinic visit or your schedule please contact [facility] directly at [phone].  Normal or non-critical lab and imaging results will be communicated to you by MyChart, letter or phone within 4 business days after the clinic has received the results. If you do not hear from us within 7 days, please contact the clinic through MyChart or phone. If you have a critical or abnormal lab result, we will notify you by phone as soon as possible.  Submit refill  "requests through [employer] or call your pharmacy and they will forward the refill request to us. Please allow 3 business days for your refill to be completed.          Additional Information About Your Visit        [employer] Information     [employer] lets you send messages to your doctor, view your test results, renew your prescriptions, schedule appointments and more. To sign up, go to [email or web address] . Click on \"Log in\" on the left side of the screen, which will take you to the Welcome page. Then click on \"Sign up Now\" on the right side of the page.     You will be asked to enter the access code listed below, as well as some personal information. Please follow the directions to create your username and password.     Your access code is: [email or web address]  Expires: 3/25/2018  6:31 AM     Your access code will  in 90 days. If you need help or a new code, please call your [facility] or [phone].        Care EveryWhere ID     This is your Care EveryWhere ID. This could be used by other organizations to access your [facility] medical records  [id number]        Your Vitals Were     Pulse Respirations Height Last Period Pulse Oximetry BMI (Body Mass Index)    83 16 5' 3\" (1.6 m) (LMP Unknown) 97% 37.02 kg/m2       Blood Pressure from Last 3 Encounters:   18 150/70   10/24/17 138/78   17 130/74    Weight from Last 3 Encounters:   18 209 lb (94.8 kg)   10/24/17 213 lb (96.6 kg)   17 207 lb (93.9 kg)              We Performed the Following     COPD ACTION PLAN     DEPRESSION ACTION PLAN (DAP)        Primary Care Provider Office Phone # Fax #    Bethanie Finnegan [phone]       [address]  [facility] [address]        Equal Access to Services     MEG RODRIGUEZ : Vik oMreno, wamargarita luqchristie, qaybta irineo soares. [facility] [phone].    ATENCIÓN: Si aric martini, tiene a rodriguez disposición " servicios gratuitos de asistencia lingüística. Chente mathew [phone].    We comply with applicable federal civil rights laws and [place] laws. We do not discriminate on the basis of race, color, national origin, age, disability, sex, sexual orientation, or gender identity.            Thank you!     Thank you for choosing [facility]  for your care. Our goal is always to provide you with excellent care. Hearing back from our patients is one way we can continue to improve our services. Please take a few minutes to complete the written survey that you may receive in the mail after your visit with us. Thank you!             Your Updated Medication List - Protect others around you: Learn how to safely use, store and throw away your medicines at www.disposemymeds.org.          This list is accurate as of: 1/23/18  1:39 PM.  Always use your most recent med list.                   Brand Name Dispense Instructions for use Diagnosis    ACE/ARB/ARNI NOT PRESCRIBED (INTENTIONAL)      1 each continuous prn ACE & ARB not prescribed due to CKD (Chronic Kidney Disease)    Type 2 diabetes, HbA1c goal < 7% (H)       ADRENAL PO           atorvastatin 20 MG tablet    LIPITOR    90 tablet    TAKE 1 TABLET (20 MG) BY MOUTH DAILY    Mixed hyperlipidemia       DUANE CONTOUR test strip   Generic drug:  blood glucose monitoring     200 strip    USE TO TEST TWICE DAILY    Type 2 diabetes mellitus with diabetic nephropathy (H)       diphenoxylate-atropine 2.5-0.025 MG per tablet    LOMOTIL    180 tablet    Take 2 tablets by mouth 4 times daily as needed for diarrhea    Irritable bowel syndrome with diarrhea       * divalproex 500 MG 24 hr tablet    DEPAKOTE ER    180 tablet    TAKE 2 TABLETS BY MOUTH DAILY    Bipolar I disorder, most recent episode depressed, in full remission (H)       * divalproex 500 MG 24 hr tablet    DEPAKOTE ER    180 tablet    TAKE 2 TABLETS BY MOUTH DAILY    Bipolar disorder, in full  remission, most recent episode depressed (H)       DULoxetine 60 MG EC capsule    CYMBALTA    90 capsule    TAKE 1 CAPSULE (60 MG) BY MOUTH DAILY    Depressed bipolar I disorder in remission (H)       furosemide 20 MG tablet    LASIX    1 tablet    Take 1 tablet (20 mg) by mouth once for 1 dose        glipiZIDE 10 MG tablet    GLUCOTROL    180 tablet    TAKE 1 TABLET (10 MG) BY MOUTH 2 TIMES DAILY (BEFORE MEALS)    Type II diabetes mellitus with nephropathy (H)       * metFORMIN 500 MG 24 hr tablet    GLUCOPHAGE-XR    360 tablet    TAKE 4 TABLETS (2,000 MG) BY MOUTH DAILY (WITH DINNER)    Type II or unspecified type diabetes mellitus without mention of complication, not stated as uncontrolled       * metFORMIN 500 MG 24 hr tablet    GLUCOPHAGE-XR    360 tablet    TAKE 2 TABLETS BY MOUTH TWICE DAILY WITH MEALS    Type 2 diabetes mellitus with diabetic nephropathy (H)       METHYL B-12 PO      Take by mouth daily as needed        OMEGA-3 FISH OIL PO      Take 1 g by mouth 2 times daily (with meals)        omeprazole 20 MG CR capsule    priLOSEC    180 capsule    Take 1 capsule (20 mg) by mouth 2 times daily    Gastroesophageal reflux disease without esophagitis       order for DME     1 each    Equipment being ordered: Long size Lumbar Roll to use when sitting  Fax: [phone]    Left-sided low back pain with left-sided sciatica       oxyCODONE-acetaminophen 5-325 MG per tablet    PERCOCET    7 tablet    Take 1 tablet by mouth every 6 hours as needed for moderate to severe pain    Chronic pain syndrome       * predniSONE 10 MG tablet    DELTASONE    90 tablet    TAKE 1 TABLET BY MOUTH EVERY DAY    Meniere's disease (cochlear hydrops), unspecified laterality       * predniSONE 1 MG tablet    DELTASONE    14 tablet    Take 2 tabs(total of 2mg)daily x 7 days        * predniSONE 5 MG tablet    DELTASONE    60 tablet    Take 1 tab daily for 7 days,        PROBIOTIC & ACIDOPHILUS EX ST PO      Take 1 tablet by mouth One  tablet daily        triamterene-hydrochlorothiazide 37.5-25 MG per capsule    DYAZIDE    90 capsule    TAKE 1 CAPSULE BY MOUTH DAILY    Benign essential hypertension       VALIUM PO      Take 5 mg by mouth At Bedtime        vitamin D 2000 UNITS Caps      Take 1 tablet by mouth One tablet daily        zolpidem 5 MG tablet    AMBIEN    30 tablet    TAKE 1 TABLET BY MOUTH NIGHTLY AS NEEDED FOR SLEEP    Primary insomnia       * Notice:  This list has 7 medication(s) that are the same as other medications prescribed for you. Read the directions carefully, and ask your doctor or other care provider to review them with you.

## 2018-01-23 NOTE — PROGRESS NOTES
SUBJECTIVE:   Tonya Yang is a 77 year old female who presents to clinic today for the following health issues:      Musculoskeletal problem/pain: LBP with Lt Sciatica       Duration: ongoing since onset 1993     Description  Location: back pain    Intensity:  moderate    Accompanying signs and symptoms: none    History  Previous similar problem: YES  Previous evaluation:  YES    Precipitating or alleviating factors:  Trauma or overuse: no   Aggravating factors include: sitting, standing and walking    Therapies tried and outcome: rest/inactivity     MRI 7-17     3 steroid injections per [facility] with last one ? 2-17  Which is when last saw them     Cannot find their notes but would imagince wanted her to f/u      had PT 2015     Saw [facility] and [facility] but they could do nothing  Tho again no f/u notes from them     LT DVT     -1st 2013 with hx recurrence since   -contributes toteh Lt sciatica pain   - now controlled with coumadin     Diabetes Follow-up      Patient is checking blood sugars: not at all    Diabetic concerns: CKD STage 3     Symptoms of hypoglycemia (low blood sugar): none     Paresthesias (numbness or burning in feet) or sores: No     Date of last diabetic eye exam: 2017     BP Readings from Last 2 Encounters:   01/23/18 150/70   10/24/17 138/78     Hemoglobin A1C (%)   Date Value   10/24/2017 6.8 (H)   02/14/2017 6.0     LDL Cholesterol Calculated (mg/dL)   Date Value   05/23/2017 50   09/06/2016 53       NONMORBID OBESITY    -BMI = 37 +   -comorbid DM, COPD       Depression and Anxiety Follow-Up    Status since last visit: Worsened with the increasing painof the LBP     Other associated symptoms:None    Complicating factors:     Significant life event: Yes-  Above LBP      Current substance abuse: Prescription Drugs [per outside provider     PHQ-9 Score and MyChart F/U Questions 5/23/2017 10/24/2017 1/23/2018   Total Score 3 5 7   Q9: Suicide Ideation Not at all Not at all Not at all     JANIE-7  "SCORE 9/8/2015 3/14/2016 5/23/2017   Total Score 3 1 1       PHQ-9  English  PHQ-9   Any Language  GAD7  Suicide Assessment Five-step Evaluation and Treatment (SAFE-T)      COPD Follow-Up      Symptoms are currently: stable    Current fatigue or dyspnea with ambulation: none    Shortness of breath: stable    Increased or change in Cough/Sputum: No    Fever(s): No    Baseline ambulation without stopping to rest:  1/2  blocks. Able to walk up 1/4 flights of stairs without stopping to rest.    Any ER/UC or hospital admissions since your last visit? No     History   Smoking Status     Former Smoker     Types: Cigarettes     Quit date: 11/15/1987   Smokeless Tobacco     Never Used     Lab Results   Component Value Date    FEV1 1.94 07/18/2013    KYZ4KER 99% 07/18/2013             Problem list and histories reviewed & adjusted, as indicated.  Additional history: as documented    Labs reviewed in EPIC    Reviewed and updated as needed this visit by clinical staff     Reviewed and updated as needed this visit by Provider         ROS:  C: NEGATIVE for fever, chills, change in weight  I: NEGATIVE for worrisome rashes, moles or lesions  E: NEGATIVE for vision changes or irritation  E/M: NEGATIVE for ear, mouth and throat problems  R: NEGATIVE for significant cough or SOB  B: NEGATIVE for masses, tenderness or discharge  CV: NEGATIVE for chest pain, palpitations or peripheral edema  GI: NEGATIVE for nausea, abdominal pain, heartburn, or change in bowel habits  : NEGATIVE for frequency, dysuria, or hematuria  MUSCULOSKELETAL:POSITIVE  for , back pain and radicular pain   N: NEGATIVE for weakness, dizziness or paresthesias  E: NEGATIVE for temperature intolerance, skin/hair changes  H: NEGATIVE for bleeding problems  P: NEGATIVE for changes in mood or affect    OBJECTIVE:     /70  Pulse 83  Resp 16  Ht 5' 3\" (1.6 m)  Wt 209 lb (94.8 kg)  LMP  (LMP Unknown)  SpO2 97%  BMI 37.02 kg/m2  Body mass index is 37.02 " kg/(m^2).  GENERAL: healthy, alert,  Distress, from LBP  obese, frail, elderly, fatigued and appears older than stated age  EYES: Eyes grossly normal to inspection, PERRL and conjunctivae and sclerae normal  RESP: lungs clear to auscultation - no rales, rhonchi or wheezes  CV: regular rate and rhythm, normal S1 S2, no S3 or S4, no murmur, click or rub, no peripheral edema and peripheral pulses strong  MS: no gross musculoskeletal defects noted, no edema  SKIN: no suspicious lesions or rashes  NEURO: Normal strength and tone, mentation intact and speech normal  PSYCH: mentation appears normal, concentration poor, confused, inattentive, affect normal/bright, anxious, fatigued and appearance well groomed- almost totally deaf     Diagnostic Test Results:  Results for orders placed or performed in visit on 10/24/17   HEMOGLOBIN A1C   Result Value Ref Range    Hemoglobin A1C 6.8 (H) 4.3 - 6.0 %   Basic metabolic panel   Result Value Ref Range    Sodium 139 133 - 144 mmol/L    Potassium 3.9 3.4 - 5.3 mmol/L    Chloride 101 94 - 109 mmol/L    Carbon Dioxide 28 20 - 32 mmol/L    Anion Gap 10 3 - 14 mmol/L    Glucose 116 (H) 70 - 99 mg/dL    Urea Nitrogen 18 7 - 30 mg/dL    Creatinine 0.84 0.52 - 1.04 mg/dL    GFR Estimate 66 >60 mL/min/1.7m2    GFR Estimate If Black 80 >60 mL/min/1.7m2    Calcium 9.5 8.5 - 10.1 mg/dL   Hemoglobin   Result Value Ref Range    Hemoglobin 12.2 11.7 - 15.7 g/dL       ASSESSMENT/PLAN:               ICD-10-CM    1. Chronic left-sided low back pain with left-sided sciatica M54.42     G89.29    2. Acute deep vein thrombosis (DVT) of left lower extremity, unspecified vein (H) 2013 w recurrence  I82.402    3. Bipolar disorder, in partial remission, most recent episode depressed (H) F31.75    4. Type 2 diabetes mellitus with diabetic nephropathy, without long-term current use of insulin (H) E11.21    5. Class 2 obesity due to excess calories with serious comorbidity and body mass index (BMI) of 37.0 to  37.9 in adult E66.01     Z68.37    6. Mucopurulent chronic bronchitis (HCC): spirometry 7-18-13  FEV=79; FEV_FVC=65% J41.1 COPD ACTION PLAN   7. Bilateral deafness H91.93        Patient Instructions   1. ICE  decreases inflammation & kills the pain coming from the nerves     WARM SOAKS/PACKS  Relax & loosen up tight muscles to reduce the pain of the        muscle tightness & spasm    2. Please come back in 2 weeks re the back pain     3.  Weight Loss Tips  1. Do not eat after 6 hrs before your expected bedtime  2. Have your heaviest meal for breakfast, a slightly lighter meal at lunch and a snack 6 hrs before bed  3. No sugar/calorie drinks except milk ie no fruit juice, pop, alcohol.  4. Drink milk 30min before meals to decrease your hunger. Also it is excellent as part of your last meal of the day snack  5. Drink lots of water  6. Increase fiber in diet: all bran cereal, salads, popcorn etc  7. Have only one small serving of fruit a day about 1/2 cup (as this is high in sugar)  8. EXERCISE is the bottom line. Without it, you will gain weight even on a low calorie diet. Best if done 2-3X a day as can    Being overweight contributes to high blood pressure and high cholesterol, both of which cause heart attacks, strokes and kidney failure, prediabetes and diabetes, arthritis, and liver disease     4. Ask at [facility] re :    A. Who is her chief provider & the tel no  So I can call     B. When was the last epidural injection and when should she get another     C. When was she supposed to be seen again  Apparently has not been seen since early 2017    D. Can she get a TENS unit from you as had a 2 wk trial with some success     5/ you can try a chiropracter      [facility] is determined to see one as states the rest of her options have not helped ,but did not , apparently f/u with [facility] or [facility] ????      Bethanie Finnegan MD  [facility]  Weight management plan: Discussed healthy diet and  exercise guidelines and patient will follow up in 1 month in clinic to re-evaluate.   NOTE  Pt is quite deaf so difficult to communicate and still wants to know every thing several times so son helps communicate   Wanted me to look at some  Lesions removed by derm but will f/u with them     Bethanie Finnegan MD

## 2018-02-23 ENCOUNTER — TELEPHONE (OUTPATIENT)
Dept: FAMILY MEDICINE | Facility: CLINIC | Age: 78
End: 2018-02-23

## 2018-02-23 NOTE — TELEPHONE ENCOUNTER
Panel Management Review      Patient has the following on her problem list:     Diabetes    ASA: Passed    Last A1C  Lab Results   Component Value Date    A1C 6.8 10/24/2017    A1C 6.0 02/14/2017    A1C 7.0 09/06/2016    A1C 8.1 02/18/2016    A1C 7.8 09/08/2015     A1C tested: Passed    Last LDL:    Lab Results   Component Value Date    CHOL 154 05/23/2017     Lab Results   Component Value Date    HDL 43 05/23/2017     Lab Results   Component Value Date    LDL 50 05/23/2017     Lab Results   Component Value Date    TRIG 305 05/23/2017     Lab Results   Component Value Date    CHOLHDLRATIO 2.8 06/04/2015     Lab Results   Component Value Date    NHDL 111 05/23/2017       Is the patient on a Statin? YES             Is the patient on Aspirin? YES    Medications     HMG CoA Reductase Inhibitors    atorvastatin (LIPITOR) 20 MG tablet          Last three blood pressure readings:  BP Readings from Last 3 Encounters:   01/23/18 150/70   10/24/17 138/78   05/23/17 130/74       Date of last diabetes office visit:      Tobacco History:     History   Smoking Status     Former Smoker     Types: Cigarettes     Quit date: 11/15/1987   Smokeless Tobacco     Never Used           Composite cancer screening  Chart review shows that this patient is due/due soon for the following None  Summary:    Patient is due/failing the following:   A1C    Action needed:   Routed to provider for review.    Type of outreach:    Sent letter.    Questions for provider review:    None                                                                                                                                    Mica Bojorquez CMA       Chart routed to  .

## 2018-02-23 NOTE — LETTER
February 23, 2018    Tonya Yang  [address]    [place] [address]    Dear Bailee Castaneda cares about your health and your health plan.  I have reviewed your medical conditions, medication list and lab results, and am making recommendations based on this review to better manage your health.    You are in particular need of attention regarding:  -Diabetes    I am recommending that you:     -schedule a FOLLOWUP OFFICE APPOINTMENT with me.         Please call us at the [facility] location:  [phone] or use [employer] to address the above recommendations.     Thank you for trusting [facility].  We appreciate the opportunity to serve you and look forward to supporting your healthcare in the future.    If you have (or plan to have) any of these tests done at a facility other than a [facility] or a [facility], please have the results sent to the [facility] location noted above.      Best Regards,    Bethanie Finnegan MD

## 2018-03-09 DIAGNOSIS — F51.01 PRIMARY INSOMNIA: ICD-10-CM

## 2018-03-11 NOTE — TELEPHONE ENCOUNTER
zolpidem (AMBIEN) 5 MG tablet  Last Written Prescription Date:  01/16/2018  Last Fill Quantity: 30,   # refills: 0  Last Office Visit: 01/23/2018  Future Office visit:       Routing refill request to provider for review/approval because:  Drug not on the [facility], [facility] or [facility] refill protocol or controlled substance

## 2018-03-12 ENCOUNTER — MEDICAL CORRESPONDENCE (OUTPATIENT)
Dept: HEALTH INFORMATION MANAGEMENT | Facility: CLINIC | Age: 78
End: 2018-03-12

## 2018-03-12 NOTE — TELEPHONE ENCOUNTER
Controlled Substance Refill Request for Zolpidem 5 mg  Problem List Complete:  Yes   checked in past 6 months?  Yes 03/12/2018    Patient is followed by Bethanie Finnegan MD for ongoing prescription of pain medication.  All refills should be approved by this provider, or covering partner.    AGREEMENT BROKEN  By getting meds from an outside provider  See 4-2016 notes     Medication(s): oxycodone a 5mgm /day .   Maximum quantity per month: 30   Clinic visit frequency required: Q 3 months     Controlled substance agreement on file: Yes       Date(s): 9-23-15    Pain Clinic evaluation in the past: No    DIRE Total Score(s):  No flowsheet data found.    Last [facility] website verification:  done on 03/12/2018   [email or web address]

## 2018-03-13 RX ORDER — ZOLPIDEM TARTRATE 5 MG/1
TABLET ORAL
Qty: 30 TABLET | Refills: 0 | Status: ON HOLD | OUTPATIENT
Start: 2018-03-13 | End: 2021-07-20

## 2018-03-21 DIAGNOSIS — I10 BENIGN ESSENTIAL HYPERTENSION: ICD-10-CM

## 2018-03-22 RX ORDER — TRIAMTERENE AND HYDROCHLOROTHIAZIDE 37.5; 25 MG/1; MG/1
CAPSULE ORAL
Qty: 90 CAPSULE | Refills: 1 | Status: SHIPPED | OUTPATIENT
Start: 2018-03-22 | End: 2018-07-01

## 2018-03-22 NOTE — TELEPHONE ENCOUNTER
"Requested Prescriptions   Pending Prescriptions Disp Refills     triamterene-hydrochlorothiazide (DYAZIDE) 37.5-25 MG per capsule [Pharmacy Med Name: TRIAMTERENE-HCTZ 37.5-25 MG CP]  Last Written Prescription Date:  6/19/17  Last Fill Quantity: 90 CAPSULE,  # refills: 2   Last office visit: 1/23/2018 with prescribing provider:  JEFRY EPPERSON   Future Office Visit:     90 capsule 2     Sig: TAKE 1 CAPSULE BY MOUTH DAILY    Diuretics (Including Combos) Protocol Failed    3/21/2018  5:10 PM       Failed - Blood pressure under 140/90 in past 12 months    BP Readings from Last 3 Encounters:   01/23/18 150/70   10/24/17 138/78   05/23/17 130/74                Passed - Recent (12 mo) or future (30 days) visit within the authorizing provider's specialty    Patient had office visit in the last 12 months or has a visit in the next 30 days with authorizing provider or within the authorizing provider's specialty.  See \"Patient Info\" tab in inbasket, or \"Choose Columns\" in Meds & Orders section of the refill encounter.           Passed - Patient is age 18 or older       Passed - No active pregancy on record       Passed - Normal serum creatinine on file in past 12 months    Recent Labs   Lab Test  10/24/17   1424   CR  0.84             Passed - Normal serum potassium on file in past 12 months    Recent Labs   Lab Test  10/24/17   1424   POTASSIUM  3.9                   Passed - Normal serum sodium on file in past 12 months    Recent Labs   Lab Test  10/24/17   1424   NA  139             Passed - No positive pregnancy test in past 12 months          "

## 2018-03-26 ENCOUNTER — OFFICE VISIT (OUTPATIENT)
Dept: AUDIOLOGY | Facility: CLINIC | Age: 78
End: 2018-03-26
Payer: MEDICARE

## 2018-03-26 DIAGNOSIS — H90.3 SENSORY HEARING LOSS, BILATERAL: Primary | ICD-10-CM

## 2018-03-26 NOTE — MR AVS SNAPSHOT
After Visit Summary   3/26/2018    Tonya Yang    MRN: [id number]           Patient Information     Date Of Birth          1940        Visit Information        Provider Department      3/26/2018 10:30 AM Swathi Hedrick, Ashlie LOPEZ [facility]iology        Today's Diagnoses     Sensory hearing loss, bilateral    -  1       Follow-ups after your visit        Who to contact     Please call your clinic at [phone] to:    Ask questions about your health    Make or cancel appointments    Discuss your medicines    Learn about your test results    Speak to your doctor            Additional Information About Your Visit        MyChart Information     [employer] is an electronic gateway that provides easy, online access to your medical records. With [employer], you can request a clinic appointment, read your test results, renew a prescription or communicate with your care team.     To sign up for [employer] visit the website at [email or web address]   You will be asked to enter the access code listed below, as well as some personal information. Please follow the directions to create your username and password.     Your access code is: [email or web address]  Expires: 2018  7:22 AM     Your access code will  in 90 days. If you need help or a new code, please contact your [facility] or call [phone] for assistance.        Care EveryWhere ID     This is your Care EveryWhere ID. This could be used by other organizations to access your [facility] medical records  [id number]        Your Vitals Were     Last Period                   (LMP Unknown)            Blood Pressure from Last 3 Encounters:   18 150/70   10/24/17 138/78   17 130/74    Weight from Last 3 Encounters:   18 94.8 kg (209 lb)   10/24/17 96.6 kg (213 lb)   17 93.9 kg (207 lb)              We Performed the Following     AUDIOGRAM/TYMPANOGRAM - INTERFACE     Hearing Aid Check, Monaural (56391)      [facility]   ([address]644)     Tymps / Reflex   (07514)        Primary Care Provider Office Phone # Fax #    Bethanie Eneida Finnegan [phone]       [address]  [facility] [address]        Equal Access to Services     [facility]: Hadii aad ku hadasho Soomaali, waaxda luqadaha, qaybta kaalmada adeegyada, waxay idiin hayaan adeeg kharash la'aan . So [facility] [phone].    ATENCIÓN: Si habla español, tiene a rodriguez disposición servicios gratuitos de asistencia lingüística. Llame al [phone].    We comply with applicable federal civil rights laws and [place] laws. We do not discriminate on the basis of race, color, national origin, age, disability, sex, sexual orientation, or gender identity.            Thank you!     Thank you for choosing [facility]  for your care. Our goal is always to provide you with excellent care. Hearing back from our patients is one way we can continue to improve our services. Please take a few minutes to complete the written survey that you may receive in the mail after your visit with us. Thank you!             Your Updated Medication List - Protect others around you: Learn how to safely use, store and throw away your medicines at www.disposemymeds.org.          This list is accurate as of 3/26/18 11:59 PM.  Always use your most recent med list.                   Brand Name Dispense Instructions for use Diagnosis    ACE/ARB/ARNI NOT PRESCRIBED (INTENTIONAL)      1 each continuous prn ACE & ARB not prescribed due to CKD (Chronic Kidney Disease)    Type 2 diabetes, HbA1c goal < 7% (H)       ADRENAL PO           atorvastatin 20 MG tablet    LIPITOR    90 tablet    TAKE 1 TABLET (20 MG) BY MOUTH DAILY    Mixed hyperlipidemia       DUANE CONTOUR test strip   Generic drug:  blood glucose monitoring     200 strip    USE TO TEST TWICE DAILY    Type 2 diabetes mellitus with diabetic nephropathy (H)        diphenoxylate-atropine 2.5-0.025 MG per tablet    LOMOTIL    180 tablet    Take 2 tablets by mouth 4 times daily as needed for diarrhea    Irritable bowel syndrome with diarrhea       * divalproex sodium extended-release 500 MG 24 hr tablet    DEPAKOTE ER    180 tablet    TAKE 2 TABLETS BY MOUTH DAILY    Bipolar I disorder, most recent episode depressed, in full remission (H)       * divalproex sodium extended-release 500 MG 24 hr tablet    DEPAKOTE ER    180 tablet    TAKE 2 TABLETS BY MOUTH DAILY    Bipolar disorder, in full remission, most recent episode depressed (H)       DULoxetine 60 MG EC capsule    CYMBALTA    90 capsule    TAKE 1 CAPSULE (60 MG) BY MOUTH DAILY    Depressed bipolar I disorder in remission (H)       glipiZIDE 10 MG tablet    GLUCOTROL    180 tablet    TAKE 1 TABLET (10 MG) BY MOUTH 2 TIMES DAILY (BEFORE MEALS)    Type II diabetes mellitus with nephropathy (H)       * metFORMIN 500 MG 24 hr tablet    GLUCOPHAGE-XR    360 tablet    TAKE 4 TABLETS (2,000 MG) BY MOUTH DAILY (WITH DINNER)    Type II or unspecified type diabetes mellitus without mention of complication, not stated as uncontrolled       * metFORMIN 500 MG 24 hr tablet    GLUCOPHAGE-XR    360 tablet    TAKE 2 TABLETS BY MOUTH TWICE DAILY WITH MEALS    Type 2 diabetes mellitus with diabetic nephropathy (H)       METHYL B-12 PO      Take by mouth daily as needed        OMEGA-3 FISH OIL PO      Take 1 g by mouth 2 times daily (with meals)        omeprazole 20 MG CR capsule    priLOSEC    180 capsule    Take 1 capsule (20 mg) by mouth 2 times daily    Gastroesophageal reflux disease without esophagitis       order for DME     1 each    Equipment being ordered: Long size Lumbar Roll to use when sitting  Fax: [phone]    Left-sided low back pain with left-sided sciatica       oxyCODONE-acetaminophen 5-325 MG per tablet    PERCOCET    7 tablet    Take 1 tablet by mouth every 6 hours as needed for moderate to severe pain    Chronic pain  syndrome       * predniSONE 10 MG tablet    DELTASONE    90 tablet    TAKE 1 TABLET BY MOUTH EVERY DAY    Meniere's disease (cochlear hydrops), unspecified laterality       * predniSONE 1 MG tablet    DELTASONE    14 tablet    Take 2 tabs(total of 2mg)daily x 7 days        * predniSONE 5 MG tablet    DELTASONE    60 tablet    Take 1 tab daily for 7 days,        PROBIOTIC & ACIDOPHILUS EX ST PO      Take 1 tablet by mouth One tablet daily        triamterene-hydrochlorothiazide 37.5-25 MG per capsule    DYAZIDE    90 capsule    TAKE 1 CAPSULE BY MOUTH DAILY    Benign essential hypertension       VALIUM PO      Take 5 mg by mouth At Bedtime        vitamin D 2000 UNITS Caps      Take 1 tablet by mouth One tablet daily        zolpidem 5 MG tablet    AMBIEN    30 tablet    TAKE 1 TABLET BY MOUTH NIGHTLY AS NEEDED FOR SLEEP    Primary insomnia       * Notice:  This list has 7 medication(s) that are the same as other medications prescribed for you. Read the directions carefully, and ask your doctor or other care provider to review them with you.

## 2018-03-27 NOTE — PROGRESS NOTES
AUDIOLOGY REPORT    SUBJECTIVE:  Tonya Yang is a 77 year old female who was seen in Audiology at the [facility] 3/26/2018 for a hearing test and hearing aid check. The patient has been seen previously in this clinic on 4/19/2016 for hearing assessment and results indicated unmeasurable (presumably) sensorineural hearing loss (patient refused speech testing) for the right ear and moderate sloping to severe sensorineural hearing loss with 28% word recognition abilities for the left ear.  The patient utilizes a [employer] 300 series behind the ear hearing aid for the left ear fit previously at another facility. The patient reports a further decline in hearing since last testing and she reports the presence of a clicking tinnitus present in her left ear in addition to the ringing tinnitus there previously.    OBJECTIVE:  Fall Risk Screen:  1. Have you fallen two or more times in the past year? No  2. Have you fallen and had an injury in the past year? No    Otoscopic exam indicates the left ear is clear of cerumen. The right ear does present with excessive cerumen but the patient refuses cleaning today.    Pure Tone Thresholds assessed using conventional audiometry with fair  reliability from 250-8000 Hz bilaterally using circumaural headphones and reassessed using insert earphones for the left ear only; patient refused testing for the right.    RIGHT:  Patient refused testing    LEFT:    Mild sloping to severe rising to moderately severe sensorineural hearing loss; essentially stable.     Tympanogram:    RIGHT: normal eardrum mobility    LEFT:   normal eardrum mobility    Reflexes (reported by stimulus ear):  RIGHT: Ipsilateral is absent at frequencies tested  RIGHT: Contralateral is absent at frequencies tested  LEFT:   Ipsilateral is absent at frequencies tested  LEFT:   Contralateral is absent at frequencies tested      Speech Reception Threshold:    RIGHT: CNT dB HL     LEFT:   40 dB HL  Word Recognition Score:     RIGHT: CNT    LEFT:   0% at 85 dB HL using NU-6 recorded word list; significant decrease re: 2016 audio (28% at that time).      ASSESSMENT:   The patient's hearing aid was deep-cleaned and assessed and found to be functioning well. The patient was counseled that due to stable hearing thresholds, a new prescription for the hearing aid is not a viable option. The patient and her son were again counseled that due to poor speech understanding abilities, we may not expect hearing aids to be a beneficial option. The patient's son expressed that the patient cannot proceed with a cochlear implant as he feels that she is not psychologically ready for something like this. The patient's son does note that the patient is on long-term Prednizone treatment and the patient is offering to strengthen her dosage on her own. This provider and the patient's son agree that this is not a good option and request that she she a specialist before altering her medication. The patient's records will be reviewed with ear, nose and throat at this facility to have the patient be assessed by a neuro-otologist on options for treatment.     PLAN:  It is recommended that the patient further consult with a neuro-otologist for options on rehabilitation as the patient is not finding benefit from hearing aids but is not willing to proceed with cochlear implant evaluation. The patient is currently on long-term Prednizone treatement and so it will be requested for the patient to see a specialist about her current treatment and recommendations related to that.  Please call this clinic with questions regarding these results or recommendations.      Sarah Franklin.  Licensed Audiologist  [place] #[id number]

## 2018-05-03 ENCOUNTER — TELEPHONE (OUTPATIENT)
Dept: FAMILY MEDICINE | Facility: CLINIC | Age: 78
End: 2018-05-03

## 2018-05-03 NOTE — TELEPHONE ENCOUNTER
Patient is due for a clinic visit to recheck blood pressure and address health maintenance.      Donte Driver PA-C

## 2018-05-03 NOTE — TELEPHONE ENCOUNTER
Called pt home number, no answer, didn't leave a message.  Called pt mobile number and call wouldn't go through

## 2018-05-08 ENCOUNTER — TELEPHONE (OUTPATIENT)
Dept: FAMILY MEDICINE | Facility: CLINIC | Age: 78
End: 2018-05-08

## 2018-05-08 NOTE — TELEPHONE ENCOUNTER
Reason for Call:  Form, our goal is to have forms completed with 72 hours, however, some forms may require a visit or additional information.    Type of letter, form or note:  medical    Who is the form from?: Home care    Where did the form come from: form was faxed in    What clinic location was the form placed at?: [facility]    Where the form was placed: 's Box: Bethanie Finnegan MD    What number is listed as a contact on the form?: [phone]  Phone [phone]       Additional comments: [employer]     Call taken on 5/8/2018 at 11:34 AM by Clare Mckenzie

## 2018-05-12 DIAGNOSIS — F31.76 DEPRESSED BIPOLAR I DISORDER IN REMISSION (H): ICD-10-CM

## 2018-05-15 RX ORDER — DULOXETIN HYDROCHLORIDE 60 MG/1
CAPSULE, DELAYED RELEASE ORAL
Qty: 90 CAPSULE | Refills: 1 | OUTPATIENT
Start: 2018-05-15

## 2018-05-15 NOTE — TELEPHONE ENCOUNTER
"Requested Prescriptions   Pending Prescriptions Disp Refills     DULoxetine (CYMBALTA) 60 MG EC capsule [Pharmacy Med Name: DULOXETINE HCL DR 60 MG CAP]  Last Written Prescription Date:  11-17-17  Last Fill Quantity: 90cap,  # refills: 1   Last office visit: 1/23/2018 with prescribing provider:     Future Office Visit:     90 capsule 1     Sig: TAKE 1 CAPSULE (60 MG) BY MOUTH DAILY    Serotonin-Norepinephrine Reuptake Inhibitors  Failed    5/12/2018  5:03 PM       Failed - Blood pressure under 140/90 in past 12 months    BP Readings from Last 3 Encounters:   01/23/18 150/70   10/24/17 138/78   05/23/17 130/74                Failed - PHQ-9 score of less than 5 in past 6 months    Please review last PHQ-9 score.   PHQ-9 SCORE 5/23/2017 10/24/2017 1/23/2018   Total Score - - -   Total Score MyChart - - 7 (Mild depression)   Total Score 3 5 7              Passed - Patient is age 18 or older       Passed - No active pregnancy on record       Passed - No positive pregnancy test in past 12 months       Passed - Recent (6 mo) or future (30 days) visit within the authorizing provider's specialty    Patient had office visit in the last 6 months or has a visit in the next 30 days with authorizing provider or within the authorizing provider's specialty.  See \"Patient Info\" tab in inbasket, or \"Choose Columns\" in Meds & Orders section of the refill encounter.              "

## 2018-05-15 NOTE — TELEPHONE ENCOUNTER
Attempted to call patient but her phone line was busy on the home phone and her cell phone number would not go through. Will try again later.

## 2018-05-15 NOTE — TELEPHONE ENCOUNTER
Routing refill request to provider for review/approval because:  Patient failed BP limits and last PHQ9 > 4--thanks

## 2018-05-17 DIAGNOSIS — E11.21 TYPE II DIABETES MELLITUS WITH NEPHROPATHY (H): ICD-10-CM

## 2018-05-18 DIAGNOSIS — F31.76 DEPRESSED BIPOLAR I DISORDER IN REMISSION (H): ICD-10-CM

## 2018-05-18 RX ORDER — GLIPIZIDE 10 MG/1
TABLET ORAL
Qty: 180 TABLET | Refills: 1 | OUTPATIENT
Start: 2018-05-18

## 2018-05-18 RX ORDER — DULOXETIN HYDROCHLORIDE 60 MG/1
CAPSULE, DELAYED RELEASE ORAL
Qty: 90 CAPSULE | Refills: 1 | OUTPATIENT
Start: 2018-05-18

## 2018-05-18 NOTE — TELEPHONE ENCOUNTER
"Rx declined per providers previous message- Needs to be seen for more.      Routing refill request to provider for review/approval because:       Failed - Blood pressure less than 140/90 in past 6 months         Failed - Patient has had a Microalbumin in the past 12 mos        Pt was advised to schedule DM follow up appt. She declined appoinment. States she \"I'm taking care of it\".    "

## 2018-05-18 NOTE — TELEPHONE ENCOUNTER
"Requested Prescriptions   Pending Prescriptions Disp Refills     DULoxetine (CYMBALTA) 60 MG EC capsule [Pharmacy Med Name: DULOXETINE HCL DR 60 MG CAP]  Last Written Prescription Date:  11/17/2017  Last Fill Quantity: 90 capsule,  # refills: 1   Last Office Visit  1/23/2018        with  [facility], P or [facility] prescribing provider:     Future Office Visit:    90 capsule 1     Sig: TAKE 1 CAPSULE (60 MG) BY MOUTH DAILY    Serotonin-Norepinephrine Reuptake Inhibitors  Failed    5/18/2018  9:02 AM       Failed - Blood pressure under 140/90 in past 12 months    BP Readings from Last 3 Encounters:   01/23/18 150/70   10/24/17 138/78   05/23/17 130/74          Failed - PHQ-9 score of less than 5 in past 6 months    Please review last PHQ-9 score.   PHQ-9 SCORE 5/23/2017 10/24/2017 1/23/2018   Total Score - - -   Total Score MyChart - - 7 (Mild depression)   Total Score 3 5 7     JANIE-7 SCORE 9/8/2015 3/14/2016 5/23/2017   Total Score 3 1 1          Passed - Patient is age 18 or older       Passed - No active pregnancy on record       Passed - No positive pregnancy test in past 12 months       Passed - Recent (6 mo) or future (30 days) visit within the authorizing provider's specialty    Patient had office visit in the last 6 months or has a visit in the next 30 days with authorizing provider or within the authorizing provider's specialty.  See \"Patient Info\" tab in inbasket, or \"Choose Columns\" in Meds & Orders section of the refill encounter.              "

## 2018-05-18 NOTE — TELEPHONE ENCOUNTER
"Requested Prescriptions   Pending Prescriptions Disp Refills     glipiZIDE (GLUCOTROL) 10 MG tablet [Pharmacy Med Name: GLIPIZIDE 10 MG TABLET]  Last Written Prescription Date:  11/7/17  Last Fill Quantity: 180,  # refills: 1   Last office visit: 1/23/2018 with prescribing provider:  Kain   Future Office Visit:     180 tablet 1     Sig: TAKE 1 TABLET (10 MG) BY MOUTH 2 TIMES DAILY (BEFORE MEALS)    Sulfonylurea Agents Failed    5/17/2018  5:09 PM       Failed - Blood pressure less than 140/90 in past 6 months    BP Readings from Last 3 Encounters:   01/23/18 150/70   10/24/17 138/78   05/23/17 130/74                Failed - Patient has had a Microalbumin in the past 12 mos.    Recent Labs   Lab Test  02/14/17   1541   02/23/12   2132   [id number]   --    --   5.0   [id number]   --    --   7.0   MICROL  10   < >   --    UMALCR  11.94   < >   --     < > = values in this interval not displayed.            Failed - Patient has documented A1c within the specified period of time.    If HgbA1C is 8 or greater, it needs to be on file within the past 3 months.  If less than 8, must be on file within the past 6 months.     Recent Labs   Lab Test  10/24/17   1424   A1C  6.8*            Passed - Patient has documented LDL within the past 12 mos.    Recent Labs   Lab Test  05/23/17   1409   LDL  50            Passed - Patient is age 18 or older       Passed - No active pregnancy on record       Passed - Patient has a recent creatinine (normal) within the past 12 mos.    Recent Labs   Lab Test  10/24/17   1424   CR  0.84            Passed - Patient has not had a positive pregnancy test within the past 12 mos.       Passed - Recent (6 mo) or future (30 days) visit within the authorizing provider's specialty    Patient had office visit in the last 6 months or has a visit in the next 30 days with authorizing provider or within the authorizing provider's specialty.  See \"Patient Info\" tab in inbasket, or \"Choose Columns\" in Meds & Orders " section of the refill encounter.

## 2018-06-04 ENCOUNTER — OFFICE VISIT (OUTPATIENT)
Dept: FAMILY MEDICINE | Facility: CLINIC | Age: 78
End: 2018-06-04
Payer: MEDICARE

## 2018-06-04 VITALS
TEMPERATURE: 98.4 F | HEIGHT: 63 IN | OXYGEN SATURATION: 95 % | DIASTOLIC BLOOD PRESSURE: 76 MMHG | HEART RATE: 78 BPM | BODY MASS INDEX: 36.86 KG/M2 | WEIGHT: 208 LBS | SYSTOLIC BLOOD PRESSURE: 152 MMHG | RESPIRATION RATE: 14 BRPM

## 2018-06-04 DIAGNOSIS — S69.91XA INJURY OF RIGHT THUMBNAIL, INITIAL ENCOUNTER: Primary | ICD-10-CM

## 2018-06-04 PROCEDURE — 99213 OFFICE O/P EST LOW 20 MIN: CPT | Performed by: FAMILY MEDICINE

## 2018-06-04 NOTE — PROGRESS NOTES
"  SUBJECTIVE:   Tonya Yang is a 77 year old female who presents to clinic today for the following health issues:        Injury to right thumb and nail      Duration: Has acrylic nails on and is attempting to grown them out.    Description (location/character/radiation): Right Thumb    Intensity:  moderate    Accompanying signs and symptoms: Nail is almost off    History (similar episodes/previous evaluation): Yes    Precipitating or alleviating factors: See above    Therapies tried and outcome: None           Problem list and histories reviewed & adjusted, as indicated.  Additional history: as documented    Labs reviewed in EPIC    Reviewed and updated as needed this visit by clinical staff  Tobacco  Allergies  Meds  Problems  Med Hx  Surg Hx  Fam Hx  Soc Hx        Reviewed and updated as needed this visit by Provider  Allergies  Meds  Problems         ROS:  CONSTITUTIONAL: NEGATIVE for fever, chills, change in weight  EYES: NEGATIVE for vision changes or irritation  ENT/MOUTH: NEGATIVE for ear, mouth and throat problems  RESP: NEGATIVE for significant cough or SOB  CV: NEGATIVE for chest pain, palpitations or peripheral edema  GI: NEGATIVE for nausea, abdominal pain, heartburn, or change in bowel habits  : NEGATIVE for frequency, dysuria, or hematuria  HEME: NEGATIVE for bleeding problems  PSYCHIATRIC: NEGATIVE for changes in mood or affect    OBJECTIVE:     /76  Pulse 78  Temp 98.4  F (36.9  C)  Resp 14  Ht 5' 3\" (1.6 m)  Wt 208 lb (94.3 kg)  LMP  (LMP Unknown)  SpO2 95%  Breastfeeding? No  BMI 36.85 kg/m2  Body mass index is 36.85 kg/(m^2).   GENERAL: elderly, alert   EYES: Eyes grossly normal to inspection, PERRL and conjunctivae and sclerae normal  HENT: ear canals and TM's normal, nose and mouth without ulcers or lesions  NECK: no adenopathy, no asymmetry, masses, or scars and thyroid normal to palpation  RESP: lungs clear to auscultation - no rales, rhonchi or wheezes  CV: regular " rate and rhythm, normal S1 S2, no S3 or S4, no murmur, click or rub, no peripheral edema and peripheral pulses strong  ABDOMEN: soft, nontender, no hepatosplenomegaly, no masses and bowel sounds normal  MS: able to move all digits and wrists without pain/tednerness  SKIN: right thumbnail with nail almost ripped off (only medial aspect of nail remains attached to her finger).  Remainder of her nails in both hands have acrylic nails glued on them  PSYCH: appears anxious    Diagnostic Test Results:  none     ASSESSMENT/PLAN:     Problem List Items Addressed This Visit     None      Visit Diagnoses     Injury of right thumbnail, initial encounter    -  Primary           Right thumbnail prepped in sterile fashion.  Alcohol swab x3 applied over the area of surgery.  Remainder of nail removed with #15 blade.  No lidocaine required.  Pt tolerated procedure well.  Minimal bleeding stopped with pressure applied with gauze pad.  Band-aide applied.  Recommended to let remainder of acrylic nails grow out by them selves and get nails professionally trimmed.  Do not reapply acrylic nails.  Avoid using nail polish.  Monitor for infection/pain; handout provided as well.  Will RTC as needed.        Keiko Fonseca, DO  [facility]

## 2018-06-04 NOTE — MR AVS SNAPSHOT
After Visit Summary   6/4/2018    Tonya Yang    MRN: [id number]           Patient Information     Date Of Birth          1940        Visit Information        Provider Department      6/4/2018 1:10 PM Keiko Fonseca DO [facility]        Care Instructions      Detached Fingernail or Toenail  A detached nail is when the nail becomes  from the area underneath it (the nail bed). This often means losing all or part of the nail. An injury to your finger or toe is often the cause. It can also be caused by an infection around or under the nail.  Sometimes there is a cut in the nail bed or a bone fracture under the nail. In most cases, the nail will grow back from the area under the cuticle (the matrix). A fingernail takes about 4 to 6 months to grow back. A toenail takes about 12 months to grow back. If the nail bed or matrix was damaged, the nail may grow back with a rough or abnormal shape. In some cases the nail may not grow back at all.    There may be damage or a cut to the nail bed. This may need to be repaired. Often this is done with stitches. If the nail is still in good condition, it might be cleaned and trimmed, then put back in place. This is done to help and protect the new nail as it grows back. It also prevents the nail bed from drying out.  Home care    Keep the injured part raised to reduce pain and swelling. This is important, especially in the first 48 hours.    Apply an ice pack for up to 20 minutes. Do this every 3 to 6 hours during the first 24 to 48 hours. Keep using ice packs to ease pain and swelling as needed. To make an ice pack, put ice cubes in a plastic bag that seals at the top. Wrap the bag in a clean, thin towel or cloth. Never put ice or an ice pack directly on the skin. The ice pack can be put right on a wrap, cast, or splint. As the ice melts, be careful not to get any wrap, cast, or splint wet.    The nail bed is moist,  soft, and sensitive. It needs to be protected from injury for the first 7 to 10 days until it dries out and becomes hard. Keep it covered with a nonstick dressing or a bandage without adhesive.    When a dressing is placed on an exposed nail bed, it may stick and be hard to remove if left in place more than 24 hours. Unless you were told otherwise, change dressings every 24 hours. If needed, soak the dressing off while holding it under warm running water. Then lightly pat the wound dry. Apply a layer of antibiotic ointment before putting on the new dressing or bandage. This will help keep it from sticking. Use a nonstick dressing with the antibiotic ointment under the outer dressing.    If an X-ray showed that you have a fracture, it will take about 4 weeks for this to heal. The injured part should be protected with a splint or tape while it is healing.    If you were given antibiotics to prevent infection, take them as directed until they are all gone.  Medicine    You can take over-the-counter medicine for pain, unless you were given a different pain medicine to use. Talk with your provider before using these medicines if you have chronic liver or kidney disease. Also talk with your provider if you ever had a stomach ulcer or GI bleeding, or are taking blood thinner medicines.    If you were given antibiotics, take them until they are done. It is important to finish the antibiotics even if the wound looks better. This is to make sure the infection has cleared.  Follow-up care  Follow up with your healthcare provider, or as advised. If X-rays were taken, you will be told of any new findings that may affect your care.  When to seek medical advice  Call your healthcare provider right away if any of these occur:    Pain or swelling increases    Redness around the nail    Pus (creamy white or yellow fluid) draining from the nail    Fever of 100.4 F (38 C) or higher, or as directed by your provider  Date Last Reviewed:  "8/1/2016 2000-2017 [employer]. [address] [place] [address], [place] [address]. All rights reserved. This information is not intended as a substitute for professional medical care. Always follow your healthcare professional's instructions.                Follow-ups after your visit        Follow-up notes from your care team     Return if symptoms worsen or fail to improve.      Who to contact     If you have questions or need follow up information about today's clinic visit or your schedule please contact [facility] directly at [phone].  Normal or non-critical lab and imaging results will be communicated to you by MyChart, letter or phone within 4 business days after the clinic has received the results. If you do not hear from us within 7 days, please contact the clinic through MyChart or phone. If you have a critical or abnormal lab result, we will notify you by phone as soon as possible.  Submit refill requests through [employer] or call your pharmacy and they will forward the refill request to us. Please allow 3 business days for your refill to be completed.          Additional Information About Your Visit        Care EveryWhere ID     This is your Care EveryWhere ID. This could be used by other organizations to access your [place] medical records  [id number]        Your Vitals Were     Pulse Temperature Respirations Height Last Period Pulse Oximetry    78 98.4  F (36.9  C) 14 5' 3\" (1.6 m) (LMP Unknown) 95%    Breastfeeding? BMI (Body Mass Index)                No 36.85 kg/m2           Blood Pressure from Last 3 Encounters:   06/04/18 152/76   01/23/18 150/70   10/24/17 138/78    Weight from Last 3 Encounters:   06/04/18 208 lb (94.3 kg)   01/23/18 209 lb (94.8 kg)   10/24/17 213 lb (96.6 kg)              Today, you had the following     No orders found for display       Primary Care Provider Office Phone # Fax #    Bethanie Finnegan [phone] " [phone]       [address]  [facility] [address]        Equal Access to Services     MEG RODRIGUEZ : Hadii aad ku hadisauroo Sobritanyali, waaxda luqadaha, qaybta kaalmada adecarlos, irineo ericksonin hayaacathy packmaru correia barrett jimenez. So [facility] [phone].    ATENCIÓN: Si habla español, tiene a rodriguez disposición servicios gratuitos de asistencia lingüística. Llame al [phone].    We comply with applicable federal civil rights laws and [place] laws. We do not discriminate on the basis of race, color, national origin, age, disability, sex, sexual orientation, or gender identity.            Thank you!     Thank you for choosing [facility]  for your care. Our goal is always to provide you with excellent care. Hearing back from our patients is one way we can continue to improve our services. Please take a few minutes to complete the written survey that you may receive in the mail after your visit with us. Thank you!             Your Updated Medication List - Protect others around you: Learn how to safely use, store and throw away your medicines at www.disposemymeds.org.          This list is accurate as of 6/4/18  1:38 PM.  Always use your most recent med list.                   Brand Name Dispense Instructions for use Diagnosis    ACE/ARB/ARNI NOT PRESCRIBED (INTENTIONAL)      1 each continuous prn ACE & ARB not prescribed due to CKD (Chronic Kidney Disease)    Type 2 diabetes, HbA1c goal < 7% (H)       ADRENAL PO           atorvastatin 20 MG tablet    LIPITOR    90 tablet    TAKE 1 TABLET (20 MG) BY MOUTH DAILY    Mixed hyperlipidemia       DUANE CONTOUR test strip   Generic drug:  blood glucose monitoring     200 strip    USE TO TEST TWICE DAILY    Type 2 diabetes mellitus with diabetic nephropathy (H)       diphenoxylate-atropine 2.5-0.025 MG per tablet    LOMOTIL    180 tablet    Take 2 tablets by mouth 4 times daily as needed for diarrhea    Irritable bowel syndrome with diarrhea       *  divalproex sodium extended-release 500 MG 24 hr tablet    DEPAKOTE ER    180 tablet    TAKE 2 TABLETS BY MOUTH DAILY    Bipolar I disorder, most recent episode depressed, in full remission (H)       * divalproex sodium extended-release 500 MG 24 hr tablet    DEPAKOTE ER    180 tablet    TAKE 2 TABLETS BY MOUTH DAILY    Bipolar disorder, in full remission, most recent episode depressed (H)       DULoxetine 60 MG EC capsule    CYMBALTA    90 capsule    TAKE 1 CAPSULE (60 MG) BY MOUTH DAILY    Depressed bipolar I disorder in remission (H)       furosemide 20 MG tablet    LASIX    1 tablet    Take 1 tablet (20 mg) by mouth once for 1 dose        glipiZIDE 10 MG tablet    GLUCOTROL    180 tablet    TAKE 1 TABLET (10 MG) BY MOUTH 2 TIMES DAILY (BEFORE MEALS)    Type II diabetes mellitus with nephropathy (H)       * metFORMIN 500 MG 24 hr tablet    GLUCOPHAGE-XR    360 tablet    TAKE 4 TABLETS (2,000 MG) BY MOUTH DAILY (WITH DINNER)    Type II or unspecified type diabetes mellitus without mention of complication, not stated as uncontrolled       * metFORMIN 500 MG 24 hr tablet    GLUCOPHAGE-XR    360 tablet    TAKE 2 TABLETS BY MOUTH TWICE DAILY WITH MEALS    Type 2 diabetes mellitus with diabetic nephropathy (H)       METHYL B-12 PO      Take by mouth daily as needed        OMEGA-3 FISH OIL PO      Take 1 g by mouth 2 times daily (with meals)        omeprazole 20 MG CR capsule    priLOSEC    180 capsule    Take 1 capsule (20 mg) by mouth 2 times daily    Gastroesophageal reflux disease without esophagitis       order for DME     1 each    Equipment being ordered: Long size Lumbar Roll to use when sitting  Fax: [phone]    Left-sided low back pain with left-sided sciatica       oxyCODONE-acetaminophen 5-325 MG per tablet    PERCOCET    7 tablet    Take 1 tablet by mouth every 6 hours as needed for moderate to severe pain    Chronic pain syndrome       * predniSONE 10 MG tablet    DELTASONE    90 tablet    TAKE 1 TABLET  BY MOUTH EVERY DAY    Meniere's disease (cochlear hydrops), unspecified laterality       * predniSONE 1 MG tablet    DELTASONE    14 tablet    Take 2 tabs(total of 2mg)daily x 7 days        * predniSONE 5 MG tablet    DELTASONE    60 tablet    Take 1 tab daily for 7 days,        PROBIOTIC & ACIDOPHILUS EX ST PO      Take 1 tablet by mouth One tablet daily        triamterene-hydrochlorothiazide 37.5-25 MG per capsule    DYAZIDE    90 capsule    TAKE 1 CAPSULE BY MOUTH DAILY    Benign essential hypertension       VALIUM PO      Take 5 mg by mouth At Bedtime        vitamin D 2000 units Caps      Take 1 tablet by mouth One tablet daily        zolpidem 5 MG tablet    AMBIEN    30 tablet    TAKE 1 TABLET BY MOUTH NIGHTLY AS NEEDED FOR SLEEP    Primary insomnia       * Notice:  This list has 7 medication(s) that are the same as other medications prescribed for you. Read the directions carefully, and ask your doctor or other care provider to review them with you.

## 2018-06-04 NOTE — PATIENT INSTRUCTIONS
Detached Fingernail or Toenail  A detached nail is when the nail becomes  from the area underneath it (the nail bed). This often means losing all or part of the nail. An injury to your finger or toe is often the cause. It can also be caused by an infection around or under the nail.  Sometimes there is a cut in the nail bed or a bone fracture under the nail. In most cases, the nail will grow back from the area under the cuticle (the matrix). A fingernail takes about 4 to 6 months to grow back. A toenail takes about 12 months to grow back. If the nail bed or matrix was damaged, the nail may grow back with a rough or abnormal shape. In some cases the nail may not grow back at all.    There may be damage or a cut to the nail bed. This may need to be repaired. Often this is done with stitches. If the nail is still in good condition, it might be cleaned and trimmed, then put back in place. This is done to help and protect the new nail as it grows back. It also prevents the nail bed from drying out.  Home care    Keep the injured part raised to reduce pain and swelling. This is important, especially in the first 48 hours.    Apply an ice pack for up to 20 minutes. Do this every 3 to 6 hours during the first 24 to 48 hours. Keep using ice packs to ease pain and swelling as needed. To make an ice pack, put ice cubes in a plastic bag that seals at the top. Wrap the bag in a clean, thin towel or cloth. Never put ice or an ice pack directly on the skin. The ice pack can be put right on a wrap, cast, or splint. As the ice melts, be careful not to get any wrap, cast, or splint wet.    The nail bed is moist, soft, and sensitive. It needs to be protected from injury for the first 7 to 10 days until it dries out and becomes hard. Keep it covered with a nonstick dressing or a bandage without adhesive.    When a dressing is placed on an exposed nail bed, it may stick and be hard to remove if left in place more than 24  hours. Unless you were told otherwise, change dressings every 24 hours. If needed, soak the dressing off while holding it under warm running water. Then lightly pat the wound dry. Apply a layer of antibiotic ointment before putting on the new dressing or bandage. This will help keep it from sticking. Use a nonstick dressing with the antibiotic ointment under the outer dressing.    If an X-ray showed that you have a fracture, it will take about 4 weeks for this to heal. The injured part should be protected with a splint or tape while it is healing.    If you were given antibiotics to prevent infection, take them as directed until they are all gone.  Medicine    You can take over-the-counter medicine for pain, unless you were given a different pain medicine to use. Talk with your provider before using these medicines if you have chronic liver or kidney disease. Also talk with your provider if you ever had a stomach ulcer or GI bleeding, or are taking blood thinner medicines.    If you were given antibiotics, take them until they are done. It is important to finish the antibiotics even if the wound looks better. This is to make sure the infection has cleared.  Follow-up care  Follow up with your healthcare provider, or as advised. If X-rays were taken, you will be told of any new findings that may affect your care.  When to seek medical advice  Call your healthcare provider right away if any of these occur:    Pain or swelling increases    Redness around the nail    Pus (creamy white or yellow fluid) draining from the nail    Fever of 100.4 F (38 C) or higher, or as directed by your provider  Date Last Reviewed: 8/1/2016 2000-2017 [employer]. [address] [place] [address], [place] [address]. All rights reserved. This information is not intended as a substitute for professional medical care. Always follow your healthcare professional's instructions.

## 2018-07-01 DIAGNOSIS — I10 BENIGN ESSENTIAL HYPERTENSION: ICD-10-CM

## 2018-07-03 NOTE — TELEPHONE ENCOUNTER
"Requested Prescriptions   Pending Prescriptions Disp Refills     triamterene-hydrochlorothiazide (DYAZIDE) 37.5-25 MG per capsule [Pharmacy Med Name: TRIAMTERENE-HCTZ 37.5-25 MG CP]  Last Written Prescription Date:  3/22/2018  Last Fill Quantity: 90,  # refills: 1   Last office visit: 6/4/2018 with prescribing provider:  Dr Fonseca   Future Office Visit:     90 capsule 1     Sig: TAKE 1 CAPSULE BY MOUTH DAILY    Diuretics (Including Combos) Protocol Failed    7/1/2018  4:48 PM       Failed - Blood pressure under 140/90 in past 12 months    BP Readings from Last 3 Encounters:   06/04/18 152/76   01/23/18 150/70   10/24/17 138/78                Passed - Recent (12 mo) or future (30 days) visit within the authorizing provider's specialty    Patient had office visit in the last 12 months or has a visit in the next 30 days with authorizing provider or within the authorizing provider's specialty.  See \"Patient Info\" tab in inbasket, or \"Choose Columns\" in Meds & Orders section of the refill encounter.           Passed - Patient is age 18 or older       Passed - No active pregancy on record       Passed - Normal serum creatinine on file in past 12 months    Recent Labs   Lab Test  10/24/17   1424   CR  0.84             Passed - Normal serum potassium on file in past 12 months    Recent Labs   Lab Test  10/24/17   1424   POTASSIUM  3.9                   Passed - Normal serum sodium on file in past 12 months    Recent Labs   Lab Test  10/24/17   1424   NA  139             Passed - No positive pregnancy test in past 12 months          "

## 2018-07-20 RX ORDER — TRIAMTERENE AND HYDROCHLOROTHIAZIDE 37.5; 25 MG/1; MG/1
CAPSULE ORAL
Qty: 90 CAPSULE | Refills: 1 | Status: ON HOLD | OUTPATIENT
Start: 2018-07-20 | End: 2021-07-26

## 2018-07-20 NOTE — TELEPHONE ENCOUNTER
Prescription approved per [facility] Refill Protocol.  Patient has been seen.

## 2018-08-07 DIAGNOSIS — E11.21 TYPE 2 DIABETES MELLITUS WITH DIABETIC NEPHROPATHY (H): ICD-10-CM

## 2018-08-07 NOTE — TELEPHONE ENCOUNTER
"Requested Prescriptions   Pending Prescriptions Disp Refills     DUANE CONTOUR test strip [Pharmacy Med Name: CONTOUR TEST STRIPS]  Last Written Prescription Date:  1-24-17  Last Fill Quantity: 200strip,  # refills: 0   Last office visit: 6/4/2018 with prescribing provider:     Future Office Visit:     200 strip 0     Sig: USE TO TEST TWICE DAILY    Diabetic Supplies Protocol Passed    8/7/2018 11:33 AM       Passed - Patient is 18 years of age or older       Passed - Recent (6 mo) or future (30 days) visit within the authorizing provider's specialty    Patient had office visit in the last 6 months or has a visit in the next 30 days with authorizing provider.  See \"Patient Info\" tab in inbasket, or \"Choose Columns\" in Meds & Orders section of the refill encounter.              "

## 2018-08-29 ENCOUNTER — OFFICE VISIT (OUTPATIENT)
Dept: AUDIOLOGY | Facility: CLINIC | Age: 78
End: 2018-08-29
Payer: MEDICARE

## 2018-08-29 DIAGNOSIS — H90.3 SENSORY HEARING LOSS, BILATERAL: Primary | ICD-10-CM

## 2018-09-17 ENCOUNTER — OFFICE VISIT (OUTPATIENT)
Dept: AUDIOLOGY | Facility: CLINIC | Age: 78
End: 2018-09-17
Payer: MEDICARE

## 2018-09-17 DIAGNOSIS — H90.3 SENSORINEURAL HEARING LOSS, BILATERAL: Primary | ICD-10-CM

## 2018-09-17 NOTE — MR AVS SNAPSHOT
After Visit Summary   2018    Tonya Yang    MRN: [id number]           Patient Information     Date Of Birth          1940        Visit Information        Provider Department      2018 3:00 PM Kavita Valerio, [facility] Audiology        Today's Diagnoses     Sensorineural hearing loss, bilateral    -  1       Follow-ups after your visit        Who to contact     Please call your clinic at [phone] to:    Ask questions about your health    Make or cancel appointments    Discuss your medicines    Learn about your test results    Speak to your doctor            Additional Information About Your Visit        MyChart Information     Let is an electronic gateway that provides easy, online access to your medical records. With Let, you can request a clinic appointment, read your test results, renew a prescription or communicate with your care team.     To sign up for CU[employer] visit the website at [email or web address]   You will be asked to enter the access code listed below, as well as some personal information. Please follow the directions to create your username and password.     Your access code is: [email or web address]  Expires: 2018  3:29 PM     Your access code will  in 90 days. If you need help or a new code, please contact your [facility] or call [phone] for assistance.        Care EveryWhere ID     This is your Care EveryWhere ID. This could be used by other organizations to access your [facility] medical records  [id number]        Your Vitals Were     Last Period                   (LMP Unknown)            Blood Pressure from Last 3 Encounters:   18 152/76   18 150/70   10/24/17 138/78    Weight from Last 3 Encounters:   18 94.3 kg (208 lb)   18 94.8 kg (209 lb)   10/24/17 96.6 kg (213 lb)              We Performed the Following     Hearing Aid Check, Monaural ([address])        Primary Care Provider  Office Phone # Fax #    Bethanie Eneida Finnegan [phone] [address]  [facility] [address]        Equal Access to Services     MEG RODRIGUEZ : Hadii blake ku raphaelo Sobritanyali, waaxda luqadaha, qaybta kaalmada adecarlos, irineo correia laRomeldeborah jimenez. So [facility] [phone].    ATENCIÓN: Si habla español, tiene a rodriguez disposición servicios gratuitos de asistencia lingüística. Llame al [phone].    We comply with applicable federal civil rights laws and [place] laws. We do not discriminate on the basis of race, color, national origin, age, disability, sex, sexual orientation, or gender identity.            Thank you!     Thank you for choosing [facility] AUDIOLOGY  for your care. Our goal is always to provide you with excellent care. Hearing back from our patients is one way we can continue to improve our services. Please take a few minutes to complete the written survey that you may receive in the mail after your visit with us. Thank you!             Your Updated Medication List - Protect others around you: Learn how to safely use, store and throw away your medicines at www.disposemymeds.org.          This list is accurate as of 9/17/18 11:59 PM.  Always use your most recent med list.                   Brand Name Dispense Instructions for use Diagnosis    ACE/ARB/ARNI NOT PRESCRIBED (INTENTIONAL)      1 each continuous prn ACE & ARB not prescribed due to CKD (Chronic Kidney Disease)    Type 2 diabetes, HbA1c goal < 7% (H)       ADRENAL PO           atorvastatin 20 MG tablet    LIPITOR    90 tablet    TAKE 1 TABLET (20 MG) BY MOUTH DAILY    Mixed hyperlipidemia       DUANE CONTOUR test strip   Generic drug:  blood glucose monitoring     200 strip    USE TO TEST TWICE DAILY    Type 2 diabetes mellitus with diabetic nephropathy (H)       diphenoxylate-atropine 2.5-0.025 MG per tablet    LOMOTIL    180 tablet    Take 2 tablets by mouth 4 times daily as needed for diarrhea     Irritable bowel syndrome with diarrhea       * divalproex sodium extended-release 500 MG 24 hr tablet    DEPAKOTE ER    180 tablet    TAKE 2 TABLETS BY MOUTH DAILY    Bipolar I disorder, most recent episode depressed, in full remission (H)       * divalproex sodium extended-release 500 MG 24 hr tablet    DEPAKOTE ER    180 tablet    TAKE 2 TABLETS BY MOUTH DAILY    Bipolar disorder, in full remission, most recent episode depressed (H)       DULoxetine 60 MG EC capsule    CYMBALTA    90 capsule    TAKE 1 CAPSULE (60 MG) BY MOUTH DAILY    Depressed bipolar I disorder in remission (H)       glipiZIDE 10 MG tablet    GLUCOTROL    180 tablet    TAKE 1 TABLET (10 MG) BY MOUTH 2 TIMES DAILY (BEFORE MEALS)    Type II diabetes mellitus with nephropathy (H)       * metFORMIN 500 MG 24 hr tablet    GLUCOPHAGE-XR    360 tablet    TAKE 4 TABLETS (2,000 MG) BY MOUTH DAILY (WITH DINNER)    Type II or unspecified type diabetes mellitus without mention of complication, not stated as uncontrolled       * metFORMIN 500 MG 24 hr tablet    GLUCOPHAGE-XR    360 tablet    TAKE 2 TABLETS BY MOUTH TWICE DAILY WITH MEALS    Type 2 diabetes mellitus with diabetic nephropathy (H)       METHYL B-12 PO      Take by mouth daily as needed        OMEGA-3 FISH OIL PO      Take 1 g by mouth 2 times daily (with meals)        omeprazole 20 MG CR capsule    priLOSEC    180 capsule    TAKE 1 CAPSULE (20 MG) BY MOUTH 2 TIMES DAILY    Gastroesophageal reflux disease without esophagitis       order for DME     1 each    Equipment being ordered: Long size Lumbar Roll to use when sitting  Fax: [phone]    Left-sided low back pain with left-sided sciatica       oxyCODONE-acetaminophen 5-325 MG per tablet    PERCOCET    7 tablet    Take 1 tablet by mouth every 6 hours as needed for moderate to severe pain    Chronic pain syndrome       * predniSONE 10 MG tablet    DELTASONE    90 tablet    TAKE 1 TABLET BY MOUTH EVERY DAY    Meniere's disease (cochlear  hydrops), unspecified laterality       * predniSONE 1 MG tablet    DELTASONE    14 tablet    Take 2 tabs(total of 2mg)daily x 7 days        * predniSONE 5 MG tablet    DELTASONE    60 tablet    Take 1 tab daily for 7 days,        PROBIOTIC & ACIDOPHILUS EX ST PO      Take 1 tablet by mouth One tablet daily        triamterene-hydrochlorothiazide 37.5-25 MG per capsule    DYAZIDE    90 capsule    TAKE 1 CAPSULE BY MOUTH DAILY    Benign essential hypertension       VALIUM PO      Take 5 mg by mouth At Bedtime        vitamin D 2000 units Caps      Take 1 tablet by mouth One tablet daily        zolpidem 5 MG tablet    AMBIEN    30 tablet    TAKE 1 TABLET BY MOUTH NIGHTLY AS NEEDED FOR SLEEP    Primary insomnia       * Notice:  This list has 7 medication(s) that are the same as other medications prescribed for you. Read the directions carefully, and ask your doctor or other care provider to review them with you.

## 2018-09-18 NOTE — PROGRESS NOTES
AUDIOLOGY REPORT    SUBJECTIVE: Tonya Yang was seen in Audiology at the [facility] on 9/17/18 for follow-up.  The patient has been seen previously in this clinic for hearing assessment and results revealed moderate to severe sensorineural hearing loss with 28% word recognition abilities for the left ear and a complete hearing loss for the right ear.  The patient utilizes a left Claudio BTE hearing aid. She reported that the tubing is hard and needs to be changed.    OBJECTIVE: Hearing aid was cleaned and checked. Tubing was changed. We reviewed walk in hours and care and maintenance.    ASSESSMENT: Hearing aid check completed.    SUMMARY AND RECOMMENDATIONS: A hearing aid check was performed today.  Adjustments were made as noted above and the patient will return as needed or at least every 4-6 months for cleaning and assessment of hearing aid.  Please call this clinic with questions regarding today s visit.    Sarah Hart, [facility]  Licensed Audiologist  [place] License #[id number]

## 2018-10-02 ENCOUNTER — TELEPHONE (OUTPATIENT)
Dept: FAMILY MEDICINE | Facility: CLINIC | Age: 78
End: 2018-10-02

## 2018-10-02 NOTE — TELEPHONE ENCOUNTER
Panel Management Review      Patient has the following on her problem list:     Diabetes    ASA:     Last A1C  Lab Results   Component Value Date    A1C 6.8 10/24/2017    A1C 6.0 02/14/2017    A1C 7.0 09/06/2016    A1C 8.1 02/18/2016    A1C 7.8 09/08/2015     A1C tested:     Last LDL:    Lab Results   Component Value Date    CHOL 154 05/23/2017     Lab Results   Component Value Date    HDL 43 05/23/2017     Lab Results   Component Value Date    LDL 50 05/23/2017     Lab Results   Component Value Date    TRIG 305 05/23/2017     Lab Results   Component Value Date    CHOLHDLRATIO 2.8 06/04/2015     Lab Results   Component Value Date    NHDL 111 05/23/2017       Is the patient on a Statin? YES             Is the patient on Aspirin? YES    Medications     HMG CoA Reductase Inhibitors    atorvastatin (LIPITOR) 20 MG tablet          Last three blood pressure readings:  BP Readings from Last 3 Encounters:   06/04/18 152/76   01/23/18 150/70   10/24/17 138/78       Date of last diabetes office visit:      Tobacco History:     History   Smoking Status     Former Smoker     Types: Cigarettes     Quit date: 11/15/1987   Smokeless Tobacco     Never Used         Hypertension   Last three blood pressure readings:  BP Readings from Last 3 Encounters:   06/04/18 152/76   01/23/18 150/70   10/24/17 138/78     Blood pressure:     HTN Guidelines:  Age 18-59 BP range:  Less than 140/90  Age 60-85 with Diabetes:  Less than 140/90  Age 60-85 without Diabetes:  less than 150/90      Composite cancer screening  Chart review shows that this patient is due/due soon for the following None  Summary:    Patient is due/failing the following:   A1C and BP CHECK    Action needed:   Patient needs office visit for BP and Diabetes.    Type of outreach:    Sent letter.    Questions for provider review:    None                                                                                                                                    Mica  HUMERA Bojorquez       Chart routed to NA .

## 2018-10-02 NOTE — LETTER
October 2, 2018    Tonya Yang  [address]    [place] [address]    Dear Bailee Castaneda cares about your health and your health plan.  I have reviewed your medical conditions, medication list and lab results, and am making recommendations based on this review to better manage your health.    You are in particular need of attention regarding:  -Diabetes  -High Blood Pressure    I am recommending that you:     -schedule a FOLLOWUP OFFICE APPOINTMENT with me.         Please call us at the [facility] location:  [phone] or use [employer] to address the above recommendations.     Thank you for trusting [facility]  We appreciate the opportunity to serve you and look forward to supporting your healthcare in the future.    If you have (or plan to have) any of these tests done at a facility other than a [facility] or a [facility], please have the results sent to the [facility] location noted above.      Best Regards,    Bethanie Finnegan MD

## 2019-01-07 ENCOUNTER — TELEPHONE (OUTPATIENT)
Dept: AUDIOLOGY | Facility: CLINIC | Age: 79
End: 2019-01-07

## 2019-01-08 ENCOUNTER — OFFICE VISIT (OUTPATIENT)
Dept: AUDIOLOGY | Facility: CLINIC | Age: 79
End: 2019-01-08
Payer: MEDICARE

## 2019-01-08 DIAGNOSIS — H90.3 SENSORY HEARING LOSS, BILATERAL: Primary | ICD-10-CM

## 2019-01-09 NOTE — TELEPHONE ENCOUNTER
Tonya Yang was seen at the [facility] on 1/7/19 for hearing aid services.  She asked to have her left earmold tubing replaced and to examine a tear in the earmold.  Tubing was replaced without issue and a small crack was noted in the vent.  Tonya was advised to schedule an appointment to obtain a new earmold if the crack continues to grow larger.  However, right now, it is very small and has no effect on acoustic function or comfort.  Tonya was given 24 size 13 batteries today, which will be billed to her insurance.  She will return to the clinic as needed.

## 2019-05-06 ENCOUNTER — OFFICE VISIT (OUTPATIENT)
Dept: AUDIOLOGY | Facility: CLINIC | Age: 79
End: 2019-05-06
Payer: MEDICARE

## 2019-05-06 DIAGNOSIS — H90.3 SENSORINEURAL HEARING LOSS, BILATERAL: Primary | ICD-10-CM

## 2019-05-06 NOTE — PROGRESS NOTES
AUDIOLOGY REPORT    SUBJECTIVE: Tonya Yang is a 78 year old female who was seen in the Audiology Clinic at the [facility] on 5/6/2019 for hearing aid walk-in services. [place], Audiology Assistant, was not available; therefore, the patient was added on for a hearing aid check appointment. Previous results have revealed moderate to severe sensorineural hearing loss with 28% word recognition abilities for the left ear and a complete hearing loss for the right ear. The patient utilizes a CellScape 300 Series behind-the-ear hearing aid for the left ear fit previously at another facility. The patient reports the tubing needs to be changed for the left hearing aid.    OBJECTIVE: The tubing was changed for the left hearing aid. A listening check revealed the hearing aid sounded crisp and clear. Per guidelines of the patient's insurance, 24 units of size 13 batteries were dispensed today.     ASSESSMENT: A hearing aid check was completed today. Changes to the hearing aid was made as outlined above. Hearing aid batteries dispensed.      PLAN: Tonya will return for follow-up as needed. Please call this clinic with any questions regarding today s appointment.      Sarah Acosta.  Licensed Audiologist  [place] #[id number]

## 2019-05-23 ENCOUNTER — TELEPHONE (OUTPATIENT)
Dept: FAMILY MEDICINE | Facility: CLINIC | Age: 79
End: 2019-05-23

## 2019-05-23 NOTE — LETTER
May 23, 2019    Tonya Yang  [address]    [place] [address]    Dear Bailee Castaneda cares about your health and your health plan.  I have reviewed your medical conditions, medication list and lab results, and am making recommendations based on this review to better manage your health.    You are in particular need of attention regarding:  -Diabetes  -Colon Cancer Screening  -Wellness (Physical) Visit     I am recommending that you:     -schedule a WELLNESS (Physical) APPOINTMENT with me.   I will check fasting labs the same day - nothing to eat except water and meds for 8-10 hours prior. (If you go elsewhere for Wellness visits then please disregard this reminder.)    -schedule a COLONOSCOPY to look for colon cancer (due every 10 years or 5 years in higher risk situations.)        Colon cancer is now the second leading cause of cancer-related deaths in the [place] for both men and women and there are over 130,000 new cases and 50,000 deaths per year from colon cancer.  Colonoscopies can prevent 90-95% of these deaths.  Problem lesions can be removed before they ever become cancer.  This test is not only looking for cancer, but also getting rid of precancerious lesions.    If you are under/uninsured, we recommend you contact the [employer] program. [employer] is a free colorectal cancer screening program that provides colonoscopies for eligible under/uninsured [place] men and women. If you are interested in receiving a free colonoscopy, please call [employer] at [phone] (mention code [employer]) to see if you re eligible.      If you do not wish to do a colonoscopy or cannot afford to do one, at this time, there is another option. It is called a FIT test or Fecal Immunochemical Occult Blood Test (take home stool sample kit).  It does not replace the colonoscopy for colorectal cancer screening, but it can detect hidden bleeding in the lower colon.  It does need to be repeated every  year and if a positive result is obtained, you would be referred for a colonoscopy.          If you have completed either one of these tests at another facility, please call with the details of when and where the tests were done and if they were normal or not. Or have the records sent to our clinic so that we can best coordinate your care.    -schedule a BONE DENSITY TEST (DEXA scan) which is due.                                                                                                             Please call us at the [facility] location:  [phone] or use [employer] to address the above recommendations.     Thank you for trusting [facility].  We appreciate the opportunity to serve you and look forward to supporting your healthcare in the future.    If you have (or plan to have) any of these tests done at a facility other than a [facility] or a [facility], please have the results sent to the [facility] location noted above.      Best Regards,    Keiko Fonseca, DO

## 2019-05-23 NOTE — TELEPHONE ENCOUNTER
Panel Management Review      Patient has the following on her problem list:     Diabetes    ASA: Failed    Last A1C  Lab Results   Component Value Date    A1C 6.8 10/24/2017    A1C 6.0 02/14/2017    A1C 7.0 09/06/2016    A1C 8.1 02/18/2016    A1C 7.8 09/08/2015     A1C tested: MONITOR    Last LDL:    Lab Results   Component Value Date    CHOL 154 05/23/2017     Lab Results   Component Value Date    HDL 43 05/23/2017     Lab Results   Component Value Date    LDL 50 05/23/2017     Lab Results   Component Value Date    TRIG 305 05/23/2017     Lab Results   Component Value Date    CHOLHDLRATIO 2.8 06/04/2015     Lab Results   Component Value Date    NHDL 111 05/23/2017       Is the patient on a Statin? NO             Is the patient on Aspirin? NO    Medications     HMG CoA Reductase Inhibitors     atorvastatin (LIPITOR) 20 MG tablet             Last three blood pressure readings:  BP Readings from Last 3 Encounters:   06/04/18 152/76   01/23/18 150/70   10/24/17 138/78       Date of last diabetes office visit: No recent office visit     Tobacco History:     History   Smoking Status     Former Smoker     Types: Cigarettes     Quit date: 11/15/1987   Smokeless Tobacco     Never Used         Hypertension   Last three blood pressure readings:  BP Readings from Last 3 Encounters:   06/04/18 152/76   01/23/18 150/70   10/24/17 138/78     Blood pressure: MONITOR    HTN Guidelines:  Less than 140/90      Composite cancer screening  Chart review shows that this patient is due/due soon for the following Colonoscopy  Summary:    Patient is due/failing the following:   A1C, COLONOSCOPY, DAP, PHYSICAL and SPIROMETRY    Action needed:   Patient needs office visit for Physical/recheck diabetes.    Type of outreach:    Sent letter.    Questions for provider review:    None                                                                                                                                    Charline Espinosa LPN     Chart  routed to  .

## 2019-05-28 ENCOUNTER — TELEPHONE (OUTPATIENT)
Dept: FAMILY MEDICINE | Facility: CLINIC | Age: 79
End: 2019-05-28

## 2019-05-28 NOTE — TELEPHONE ENCOUNTER
Our goal is to have forms completed within 72 hours, however some forms may require a visit or additional information.    What clinic location was the form placed at [facility] or [facility]     Who is the form from?   Where did the form come from? Faxed to clinic   The form was placed in the inbox of Bethanie Finnegan MD      Please fax to [phone]  Phone number: [phone]    Additional comments: [employer] poise thin shape ultimate asorb     Call take on 5/28/2019 at 9:48 AM by Clare Mckenzie

## 2019-06-16 ENCOUNTER — OFFICE VISIT (OUTPATIENT)
Dept: URGENT CARE | Facility: URGENT CARE | Age: 79
End: 2019-06-16
Payer: MEDICARE

## 2019-06-16 VITALS
TEMPERATURE: 97.8 F | WEIGHT: 175 LBS | SYSTOLIC BLOOD PRESSURE: 152 MMHG | BODY MASS INDEX: 31 KG/M2 | DIASTOLIC BLOOD PRESSURE: 67 MMHG | OXYGEN SATURATION: 96 %

## 2019-06-16 DIAGNOSIS — K13.79 MOUTH SORE: Primary | ICD-10-CM

## 2019-06-16 PROCEDURE — 99213 OFFICE O/P EST LOW 20 MIN: CPT | Performed by: FAMILY MEDICINE

## 2019-06-16 NOTE — PATIENT INSTRUCTIONS
Take full course of antibiotic to help with possible infection.  Follow up with dental clinic to assess for teeth/dentures

## 2019-06-16 NOTE — PROGRESS NOTES
SUBJECTIVE:   Tonya Yang is a 78 year old female presenting with a chief complaint of lower lip sore.    Patient had lost her bridge about 2 months ago due to dental related concerns and has ended up biting her lower lip.  Patient denies any problems with lip biting when she had her bridge.  Patient is on chronic prednisone, has diabetes, HTN.  Denies any problems with swallowing but states that her mouth does gets very dry.    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     Hepatic abscess      Hyperlipidemia      Hypertension      Irritable bowel syndrome      Meniere disease      Neuropathy      Noninfectious ileitis      Peritoneal abscess (H)      Spinal stenosis of lumbar region      Steroid long-term use      Vaginitis, atrophic, postmenopausal      Vitamin D deficiency      Current Outpatient Medications   Medication Sig Dispense Refill     ACE/ARB NOT PRESCRIBED, INTENTIONAL, 1 each continuous prn ACE & ARB not prescribed due to CKD (Chronic Kidney Disease)       atorvastatin (LIPITOR) 20 MG tablet TAKE 1 TABLET (20 MG) BY MOUTH DAILY 90 tablet 0     DUANE CONTOUR test strip USE TO TEST TWICE DAILY 200 strip 1     Cholecalciferol (VITAMIN D) 2000 UNITS CAPS Take 1 tablet by mouth One tablet daily       Diazepam (VALIUM PO) Take 5 mg by mouth At Bedtime       diphenoxylate-atropine (LOMOTIL) 2.5-0.025 MG per tablet Take 2 tablets by mouth 4 times daily as needed for diarrhea 180 tablet 0     divalproex (DEPAKOTE ER) 500 MG 24 hr tablet TAKE 2 TABLETS BY MOUTH DAILY 180 tablet 0     divalproex (DEPAKOTE ER) 500 MG 24 hr tablet TAKE 2  TABLETS BY MOUTH DAILY 180 tablet 0     DULoxetine (CYMBALTA) 60 MG EC capsule TAKE 1 CAPSULE (60 MG) BY MOUTH DAILY 90 capsule 1     glipiZIDE (GLUCOTROL) 10 MG tablet TAKE 1 TABLET (10 MG) BY MOUTH 2 TIMES DAILY (BEFORE MEALS) 180 tablet 1     metFORMIN (GLUCOPHAGE-XR) 500 MG 24 hr tablet TAKE 2 TABLETS BY MOUTH TWICE DAILY WITH MEALS 360 tablet 1     metFORMIN (GLUCOPHAGE-XR) 500 MG 24 hr tablet TAKE 4 TABLETS (2,000 MG) BY MOUTH DAILY (WITH DINNER) 360 tablet 1     Methylcobalamin (METHYL B-12 PO) Take by mouth daily as needed       Misc Natural Products (ADRENAL PO)        Omega-3 Fatty Acids (OMEGA-3 FISH OIL PO) Take 1 g by mouth 2 times daily (with meals)       omeprazole (PRILOSEC) 20 MG CR capsule TAKE 1 CAPSULE (20 MG) BY MOUTH 2 TIMES DAILY 180 capsule 2     order for DME Equipment being ordered: Long size Lumbar Roll to use when sitting    Fax: [phone] 1 each prn     oxyCODONE-acetaminophen (PERCOCET) 5-325 MG per tablet Take 1 tablet by mouth every 6 hours as needed for moderate to severe pain 7 tablet 0     predniSONE (DELTASONE) 1 MG tablet Take 2 tabs(total of 2mg)daily x 7 days 14 tablet 0     predniSONE (DELTASONE) 10 MG tablet TAKE 1 TABLET BY MOUTH EVERY DAY 90 tablet 0     predniSONE (DELTASONE) 5 MG tablet Take 1 tab daily for 7 days, 60 tablet 0     Probiotic Product (PROBIOTIC & ACIDOPHILUS EX ST PO) Take 1 tablet by mouth One tablet daily       triamterene-hydrochlorothiazide (DYAZIDE) 37.5-25 MG per capsule TAKE 1 CAPSULE BY MOUTH DAILY 90 capsule 1     zolpidem (AMBIEN) 5 MG tablet TAKE 1 TABLET BY MOUTH NIGHTLY AS NEEDED FOR SLEEP 30 tablet 0     furosemide (LASIX) 20 MG tablet Take 1 tablet (20 mg) by mouth once for 1 dose 1 tablet 0     Social History     Tobacco Use     Smoking status: Former Smoker     Types: Cigarettes     Last attempt to quit: 11/15/1987     Years since quittin.6     Smokeless tobacco: Never Used   Substance Use Topics     Alcohol use: No      Alcohol/week: 0.0 oz     Comment: MALISSA white since 9/131986       ROS:  Review of systems negative except as stated above.    OBJECTIVE:  /67   Temp 97.8  F (36.6  C) (Tympanic)   Wt 79.4 kg (175 lb)   LMP  (LMP Unknown)   SpO2 96%   BMI 31.00 kg/m    GENERAL APPEARANCE: healthy, alert and no distress  EYES: EOMI,  PERRL, conjunctiva clear  HENT: lower lip mucosa - open sore, no drainage, no erythema.  No white patches on tongue or buccal muscosa  PSYCH: mentation appears normal and affect normal/bright    ASSESSMENT/PLAN:  (K13.79) Mouth sore  (primary encounter diagnosis)  Comment: lower lip  Plan: amoxicillin-clavulanate (AUGMENTIN) 875-125 MG         tablet            Unclear if has a true infection but patient has been struggling with lip biting and mouth sores for the past 2 months.  Concerning for indolent infection due to persistent wound, will give trial of antibiotic - RX Augmentin given.  Reviewed that persistent sores may need biopsy but will see how antibiotic works, primary provider may refer if indicated to specialist.  Recommend to follow up with dental clinic in regards to obtaining viable solution for dental concerns.    Follow up with her primary provider for recheck within 2 weeks.    Felipe Barrios MD  June 16, 2019 4:22 PM

## 2019-09-24 ENCOUNTER — TRANSFERRED RECORDS (OUTPATIENT)
Dept: HEALTH INFORMATION MANAGEMENT | Facility: CLINIC | Age: 79
End: 2019-09-24

## 2019-10-28 ENCOUNTER — TELEPHONE (OUTPATIENT)
Dept: AUDIOLOGY | Facility: CLINIC | Age: 79
End: 2019-10-28

## 2019-10-28 ENCOUNTER — OFFICE VISIT (OUTPATIENT)
Dept: AUDIOLOGY | Facility: CLINIC | Age: 79
End: 2019-10-28
Payer: MEDICARE

## 2019-10-28 DIAGNOSIS — H90.3 SENSORY HEARING LOSS, BILATERAL: Primary | ICD-10-CM

## 2019-10-28 NOTE — TELEPHONE ENCOUNTER
Tonya Yang was seen at the [facility] on 10/28/19 for hearing aid services.  Her left hearing aid and earmold were cleaned and the earmold tubing was replaced.  The hearing aid was found to be working normally.  Tonya reported a comfortable fit and good sound quality.  She was provided with 24 size 13 batteries today which will be billed to her insurance.

## 2019-12-27 NOTE — TELEPHONE ENCOUNTER
"Routing refill request to provider for review/approval because:     Failed - Blood pressure less than 140/90 in past 6 months       Failed - Patient has had a Microalbumin in the past 12 mos      Pt was advised to schedule DM follow up appt. She declined appoinment. States she \"I'm taking care of it\".  " Yes

## 2020-04-21 ENCOUNTER — TELEPHONE (OUTPATIENT)
Dept: AUDIOLOGY | Facility: CLINIC | Age: 80
End: 2020-04-21

## 2020-04-21 NOTE — TELEPHONE ENCOUNTER
Tonya Yang was seen at the [facility] on 4/21/20 for hearing aid services.  She asked to have the tubing replaced on her left earmold.  The tubing was replaced without issue and the hearing aid was found to be working normally.  Tonya reported a comfortable fit and good sound quality after today's work.  She was given 24 size 13 batteries, which will be billed to her insurance.  She will return to the clinic as needed.

## 2020-04-22 ENCOUNTER — OFFICE VISIT (OUTPATIENT)
Dept: AUDIOLOGY | Facility: CLINIC | Age: 80
End: 2020-04-22
Payer: MEDICARE

## 2020-04-22 DIAGNOSIS — H90.3 SENSORINEURAL HEARING LOSS, BILATERAL: Primary | ICD-10-CM

## 2020-05-31 ENCOUNTER — OFFICE VISIT (OUTPATIENT)
Dept: URGENT CARE | Facility: URGENT CARE | Age: 80
End: 2020-05-31
Payer: MEDICARE

## 2020-05-31 ENCOUNTER — ANCILLARY PROCEDURE (OUTPATIENT)
Dept: GENERAL RADIOLOGY | Facility: CLINIC | Age: 80
End: 2020-05-31
Attending: PHYSICIAN ASSISTANT
Payer: MEDICARE

## 2020-05-31 VITALS
OXYGEN SATURATION: 97 % | HEART RATE: 79 BPM | RESPIRATION RATE: 14 BRPM | SYSTOLIC BLOOD PRESSURE: 110 MMHG | DIASTOLIC BLOOD PRESSURE: 64 MMHG | WEIGHT: 190 LBS | HEIGHT: 65 IN | BODY MASS INDEX: 31.65 KG/M2 | TEMPERATURE: 98 F

## 2020-05-31 DIAGNOSIS — B37.0 THRUSH: ICD-10-CM

## 2020-05-31 DIAGNOSIS — M65.4 TENOSYNOVITIS, DE QUERVAIN: Primary | ICD-10-CM

## 2020-05-31 DIAGNOSIS — M79.642 PAIN OF LEFT HAND: ICD-10-CM

## 2020-05-31 PROCEDURE — 99214 OFFICE O/P EST MOD 30 MIN: CPT | Performed by: PHYSICIAN ASSISTANT

## 2020-05-31 PROCEDURE — 73130 X-RAY EXAM OF HAND: CPT | Mod: LT

## 2020-05-31 RX ORDER — NYSTATIN 100000/ML
500000 SUSPENSION, ORAL (FINAL DOSE FORM) ORAL 4 TIMES DAILY
Qty: 200 ML | Refills: 0 | Status: SHIPPED | OUTPATIENT
Start: 2020-05-31 | End: 2020-06-10

## 2020-05-31 RX ORDER — MELOXICAM 7.5 MG/1
7.5 TABLET ORAL DAILY
Qty: 20 TABLET | Refills: 0 | Status: SHIPPED | OUTPATIENT
Start: 2020-05-31 | End: 2020-06-25

## 2020-05-31 RX ORDER — GLIMEPIRIDE 2 MG/1
2 TABLET ORAL
Status: ON HOLD | COMMUNITY
End: 2021-07-20

## 2020-05-31 RX ORDER — NYSTATIN 100000/ML
500000 SUSPENSION, ORAL (FINAL DOSE FORM) ORAL 4 TIMES DAILY
Qty: 200 ML | Refills: 0 | Status: SHIPPED | OUTPATIENT
Start: 2020-05-31 | End: 2020-05-31

## 2020-05-31 ASSESSMENT — MIFFLIN-ST. JEOR: SCORE: 1347.71

## 2020-05-31 NOTE — PROGRESS NOTES
So For your hand -- I want to get an XR -- make sure there is not any bony lesions, fracture etc    Did you hit it on anything?       For your tongue -- I dont see the white patches like I usually do in thrush, BUT that beefy red tongue likely is a fungal infection (plus you improved with the medicine last time)   I will re-RX that for you to gargle (swish and spit)   IF it returns once more -- you need dentist follow-up (biopsy possibly)     BORN in [place]     Infection    Infection less likely. I suspect that you have a tenosynovitis (inflammation of the tendon) from overuse or more likely a sprain.     You are able to move thumb, so I think not dislocated   But you may have some arthritis in the thumb joint as well.     During an ex acer bation (flare up) can cause swelling  But the xr will tell us more

## 2020-05-31 NOTE — PROGRESS NOTES
CHIEF COMPLAINT:   Chief Complaint   Patient presents with     Urgent Care     Musculoskeletal Problem     started 1 1/2 weeks ago, left hand pain and swelling in thumb.      Oral Swelling     Had thrush and took meds about 1 month ago, concern it has come back on tip of tongue, blisters on gums.        HPI: Tonya Yang is a 79 year old female who presents to clinic today for evaluation.  1. Hand. Approximately 1.5 weeks ago, patient developed pain and swelling around her thumb. Pain located on the lateral aspect and moves into her wrist. Has continued and is not improving. Swelling in the last couple of days. She has been using ice on the area without improvement. Recently started using a cane, which she uses with her left hand. No fever or chills. No abrasion or cuts in the area. Denies having trauma. Denies numbness, tingling or icy sensation into fingertips.     2. Oral problem. For the past 1-1.5 weeks has had tongue pain. Nothing makes it better or worse. Pain mostly located at the tip of her tongue. Has similar symptoms one month ago and used antifungal mouthwash with resolution of symptoms in about one week. Wears dentures. Denies having sore throat or pain with swallowing. Additionally, has a canker sore.     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     Hepatic abscess      Hyperlipidemia      Hypertension      Irritable bowel syndrome      Meniere disease      Neuropathy      Noninfectious ileitis      Peritoneal abscess (H)      Spinal stenosis of lumbar region      Steroid long-term use      Vaginitis, atrophic, postmenopausal       Vitamin D deficiency      Past Surgical History:   Procedure Laterality Date     CATARACT IOL, RT/LT Left 2013     FOOT SURGERY      toe     GI SURGERY       LAPAROSCOPIC HEPATECTOMY PARTIAL  2013    Procedure: LAPAROSCOPIC HEPATECTOMY PARTIAL;  Laparoscopic Debridement of Liver Abcess;  Surgeon: Sepideh Green MD;  Location: [facility] OR     ORTHOPEDIC SURGERY       PICC INSERTION  2013    5fr DL Power PICC, 41cm (1cm external length) in the R lateral brachial vein with tip in the SVC RA junction.     RECTAL SURGERY      1970s     VASCULAR SURGERY       Social History     Tobacco Use     Smoking status: Former Smoker     Types: Cigarettes     Last attempt to quit: 11/15/1987     Years since quittin.5     Smokeless tobacco: Never Used   Substance Use Topics     Alcohol use: No     Alcohol/week: 0.0 standard drinks     Comment: MALISSA -paco since      Current Outpatient Medications   Medication     atorvastatin (LIPITOR) 20 MG tablet     Diazepam (VALIUM PO)     divalproex (DEPAKOTE ER) 500 MG 24 hr tablet     DULoxetine (CYMBALTA) 60 MG EC capsule     glimepiride (AMARYL) 2 MG tablet     meloxicam (MOBIC) 7.5 MG tablet     metFORMIN (GLUCOPHAGE-XR) 500 MG 24 hr tablet     nystatin (MYCOSTATIN) 129972 UNIT/ML suspension     order for DME     predniSONE (DELTASONE) 5 MG tablet     triamterene-hydrochlorothiazide (DYAZIDE) 37.5-25 MG per capsule     ACE/ARB NOT PRESCRIBED, INTENTIONAL,     furosemide (LASIX) 20 MG tablet     oxyCODONE-acetaminophen (PERCOCET) 5-325 MG per tablet     zolpidem (AMBIEN) 5 MG tablet     No current facility-administered medications for this visit.      Allergies   Allergen Reactions     Propofol Nausea and Vomiting     Shellfish Allergy      Other reaction(s): Dizziness       10 point ROS of systems including Constitutional, Eyes, Respiratory, Cardiovascular, Gastroenterology, Genitourinary, Integumentary, Muscularskeletal, Psychiatric were all negative except  "for pertinent positives noted in my HPI.        Exam:  /64   Pulse 79   Temp 98  F (36.7  C) (Oral)   Resp 14   Ht 1.651 m (5' 5\")   Wt 86.2 kg (190 lb)   LMP  (LMP Unknown)   SpO2 97%   BMI 31.62 kg/m    Constitutional: healthy, alert and no distress  Head: Normocephalic, atraumatic.  Eyes: conjunctiva clear, no drainage  ENT: TMs clear and shiny daniela, nasal mucosa pink and moist, throat without tonsillar hypertrophy or erythema. Tongue is fissured and beefy red. No white patches or lesions noted  Neck: neck is supple, no cervical lymphadenopathy or nuchal rigidity  Cardiovascular: RRR  Respiratory: CTA bilaterally, no rhonchi or rales  M/S: Thumb is swollen and is TTP on lateral aspect and into wrist. +Finklestien test.   Skin: no rashes  Neurologic: Speech clear, gait normal. Moves all extremities.    Results for orders placed or performed in visit on 05/31/20   XR Hand Left G/E 3 Views     Status: None    Narrative    Examination:  XR HAND LT G/E 3 VW    Date:  5/31/2020 5:25 PM     Clinical Information: Pain of left hand     Additional Information: none    Comparison: none      Impression    Impression:  1.  Negative for acute fracture or dislocation.  2.  Moderate degenerative arthrosis in the thumb at the MCP and CMC  joints.  3.  Negative left hand otherwise.    SHAD ARIZA MD         ASSESSMENT/PLAN:  1. Tenosynovitis, de Quervain  Suspect form recent cane usage.   Splint given in clinic. Continue with ice and rest  Will trial Mobic, as she has not seen relief with percocet. Must take with food.   - XR Hand Left G/E 3 Views; Future  - meloxicam (MOBIC) 7.5 MG tablet; Take 1 tablet (7.5 mg) by mouth daily  Dispense: 20 tablet; Refill: 0  - order for DME; Equipment being ordered: wrist brace  Dispense: 1 Device; Refill: 0    2. Thrush  Exam c/w thrust, suspect from dentures.  Advised to clean dentures thoroughly   RX for nystatin  If tongue pain returns, please follow-up with dentist / " ENT  - nystatin (MYCOSTATIN) 657931 UNIT/ML suspension; Swish and spit 5 mLs (500,000 Units) in mouth 4 times daily for 10 days  Dispense: 200 mL; Refill: 0      Diagnosis and treatment plan was reviewed with patient and/or family.   We went over any labs or imaging. Discussed worsening symptoms or little to no relief despite treatment plan to follow-up with PCP or return to clinic.  Patient verbalizes understanding. All questions were addressed and answered.   Leah Feliz PA-C

## 2020-05-31 NOTE — PATIENT INSTRUCTIONS
Continue to Ice the area  I am giving you a rx for Mobic, that can cause GI upset, so make sure you take it with food  Wear the splint during the day    For your tongue, use the solution as prescribed and swish and spit.     Patient Education     Understanding De Quervain Tenosynovitis    De Quervain tenosynovitis is a condition that can cause wrist and thumb pain. Tendons connect muscles in your wrist and forearm to the bones in your thumb. The tendons have a protective cover (sheath). The sheath s lining makes a fluid that lets the tendons slide easily when you straighten your wrist and thumb. If any of these tendons are irritated or injured, they can become swollen and inflamed. This is called de Quervain tenosynovitis.  How to say it  [id number]   What causes de Quervain tenosynovitis?  This condition is most often caused from overuse. For example, making the same wrist motions over and over can irritate the tendons. This includes doing things like unscrewing jar lids or grasping a tool. Activities such as typing, playing racquet sports, knitting, and texting can also lead to the condition.  Symptoms of de Quervain tenosynovitis  You may have pain, soreness, redness, and swelling along the side of your wrist and the base of your thumb. You may feel pain when you pinch or grasp things, turn or touch your wrist, or make a fist. Your thumb may catch or make a crackling sound when you move it.  Treatment for de Quervain tenosynovitis  Treatments may include:    Resting the wrist and thumb. This involves limiting movements that make your symptoms worse. You also may need to avoid certain hobbies, sports, and types of work for a time.    Cold packs. These help reduce pain and swelling.    Prescription or over-the-counter pain medicines. These help relieve pain and swelling.    Splint or brace. This helps keep the thumb and wrist from moving and gives time for your tendons to heal.    Exercises  or physical therapy. These help stretch, strengthen, and improve the range of motion in your wrist and thumb.    Shots of medicine into the area around the tendon. These may help relieve symptoms for a time.    Surgery. You may need surgery if other treatments don t relieve symptoms. During surgery, the surgeon releases the sheath that surrounds the tendons so the tendons can move more easily.  Possible complications of de Quervain tenosynovitis  Without proper care and treatment, healing may take longer than normal. Also, symptoms may continue or get worse. Over time, the problem may become long-term (chronic). This can make it hard to use your wrist and thumb for normal activities.  When to call your healthcare provider  Call your healthcare provider right away if you have any of these:    Fever of 100.4 F (38 C) or higher, or as directed    Symptoms that don t get better with treatment, or get worse    Pain, numbness, or coldness in the hand    New symptoms   Date Last Reviewed: 3/10/2016    5822-6743 [employer]. [address] [place] [address], [place] [address]. All rights reserved. This information is not intended as a substitute for professional medical care. Always follow your healthcare professional's instructions.

## 2020-06-09 ENCOUNTER — NURSE TRIAGE (OUTPATIENT)
Dept: NURSING | Facility: CLINIC | Age: 80
End: 2020-06-09

## 2020-06-09 ENCOUNTER — TELEPHONE (OUTPATIENT)
Dept: URGENT CARE | Facility: URGENT CARE | Age: 80
End: 2020-06-09

## 2020-06-09 NOTE — TELEPHONE ENCOUNTER
[facility]     Patient/parent Name  Tonya    Reason for call  Patient requesting x-ray result    Lab Result  X-ray result impression read to patient, hand x-ray from 5/31/2020  Current symptoms  [id number]: Patient requesting x-ray result. States that her son was called with the result but he didn't give the result to her and she wants to know what she is supposed to do about ongoing hand pain. Has an appointment set up with her provider on 6/15/2020.   Recommendations/Instructions  X-ray impression read to patient. Advised the patient that I do not interpret the result and that she needs to speak with her PCP regarding ongoing care. The patient states that she goes to a private clinic that does not have a triage nurse and she wants to talk to someone about her ongoing pain.  This RN tried to warm transfer the patient to [facility] to speak with a triage nurse but she disconnected the call before I could connect her with a triage nurse.     Contact your PCP clinic or return to the Emergency department if your:    For any concerns    PCP follow-up Questions asked: YES       [RN Name]  Eneida West RN  "[employer] [facility] RN  Lung Nodule and ED Lab Result RN  Epic pool (ED late result f/u RN): [id number]  [facility] INCIDENTAL RADIOLOGY F/U NURSES: P [id number]  # [phone]

## 2020-06-25 ENCOUNTER — TELEPHONE (OUTPATIENT)
Dept: FAMILY MEDICINE | Facility: CLINIC | Age: 80
End: 2020-06-25

## 2020-06-25 NOTE — TELEPHONE ENCOUNTER
Reason for Call:  Form, our goal is to have forms completed with 72 hours, however, some forms may require a visit or additional information.    Type of letter, form or note:  medical    Who is the form from?: DME    Where did the form come from: form was faxed in    What clinic location was the form placed at?: [facility]    Where the form was placed: [facility] Box/Folder    What number is listed as a contact on the form?: [phone] (P) [phone]       Additional comments: [employer]: [employer]    Call taken on 6/25/2020 at 2:46 PM by Shameka Orr

## 2020-08-18 ENCOUNTER — OFFICE VISIT (OUTPATIENT)
Dept: URGENT CARE | Facility: URGENT CARE | Age: 80
End: 2020-08-18
Payer: MEDICARE

## 2020-08-18 VITALS
TEMPERATURE: 97.9 F | SYSTOLIC BLOOD PRESSURE: 181 MMHG | OXYGEN SATURATION: 98 % | HEART RATE: 87 BPM | DIASTOLIC BLOOD PRESSURE: 87 MMHG

## 2020-08-18 DIAGNOSIS — G89.29 CHRONIC BILATERAL LOW BACK PAIN WITH LEFT-SIDED SCIATICA: Primary | ICD-10-CM

## 2020-08-18 DIAGNOSIS — M54.42 CHRONIC BILATERAL LOW BACK PAIN WITH LEFT-SIDED SCIATICA: Primary | ICD-10-CM

## 2020-08-18 PROCEDURE — 99214 OFFICE O/P EST MOD 30 MIN: CPT | Mod: 25 | Performed by: FAMILY MEDICINE

## 2020-08-18 PROCEDURE — 96372 THER/PROPH/DIAG INJ SC/IM: CPT | Performed by: FAMILY MEDICINE

## 2020-08-18 RX ORDER — KETOROLAC TROMETHAMINE 30 MG/ML
30 INJECTION, SOLUTION INTRAMUSCULAR; INTRAVENOUS ONCE
Status: COMPLETED | OUTPATIENT
Start: 2020-08-18 | End: 2020-08-18

## 2020-08-18 RX ADMIN — KETOROLAC TROMETHAMINE 30 MG: 30 INJECTION, SOLUTION INTRAMUSCULAR; INTRAVENOUS at 11:01

## 2020-08-18 NOTE — PROGRESS NOTES
"SUBJECTIVE  HPI: Tonya Yang is a 79 year old female who presents for evaluation of back pain.  She specifically wants \"a shot of Tylenol\" because that's what worked really well for her at an ER visit.  Pt has a history of chronic back pain with some radiation down left leg that is due to spinal stenosis.  She has been seen for this at [facility] in [place].  She has had some epidural injections there, but none have been particularly helpful.  It is unclear to me whether she's done any spinal stim or intrathecal pain pump trials.    Today she has a flare of pain in her back that is radiating down both legs.  No other new symptoms.  She says that nothing is working for her pain.    Chart review shows a visit to [facility] on 3/21/2020.  Their pain management plan there was topical lidocaine, oral Tylenol, and an injection of Zyprexa 5 mg.  Pt repeatedly states that she wants the shot she got at the ER.    Pt's son is present to help facilitate communication given pt's hearing loss.        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     Hepatic abscess      Hyperlipidemia      Hypertension      Irritable bowel syndrome      Meniere disease      Neuropathy      Noninfectious ileitis      Peritoneal abscess (H)      Spinal stenosis of lumbar region      Steroid long-term use      Vaginitis, atrophic, postmenopausal      Vitamin D deficiency        Social History     Tobacco Use     Smoking status: Former Smoker     Types: Cigarettes     Last attempt to quit: 11/15/1987     Years since quittin.7     Smokeless tobacco: Never Used   Substance Use " Topics     Alcohol use: No     Alcohol/week: 0.0 standard drinks     Comment: MALISSA white since 9/131986     ROS:  L lower leg has been slightly more swollen than the right side for a while.  L thumb pain/swelling--wearing a brace for this.    OBJECTIVE:  BP (!) 181/87 (BP Location: Right arm, Cuff Size: Adult Regular)   Pulse 87   Temp 97.9  F (36.6  C) (Tympanic)   LMP  (LMP Unknown)   SpO2 98%   GEN: appears uncomfortable, in no acute distress.  Ambulates with a cane.  EXT: L leg with slight edema to mid-shin.  Tender to palpation in the lower shin with light touch.  Sensation to light touch intact throughout the dermatomes of both lower extremities.  No calf swelling to suggest DVT at this time.  No focal motor deficit in either lower extremity.    ASSESSMENT/IMPRESSION:  Acute exacerbation of chronic low back pain due to spinal stenosis  No evidence of acute neurological compromise at this time.     PLAN:  Discussed with patient and her son that we do not have the option of IM Zyprexa in the clinic and that this is not something that would typically be available outside of the ER.  We do have the option of Toradol IM here, and we discussed trying this to see if it helps instead of having her go straight to the ER.    Pt is already under the care of the team at [facility] in [place].  I recommended that she follow up there to review her current pain treatment regimen and see what other options they can offer.

## 2020-08-18 NOTE — PATIENT INSTRUCTIONS
Today you got Toradol 30 mg as a shot.  This medicine helps with pain.      The medicine that you found effective in the ER was Zyprexa (or olanzapine).  You got a shot of 5 mg of this medicine on 3/21/2020.    Please make an appointment to see your pain specialists again as soon as your can.    If the pain is not getting better within a few hours of today's shot, you can return to the ER to see about getting the Zyprexa injection again.

## 2020-11-09 ENCOUNTER — OFFICE VISIT (OUTPATIENT)
Dept: AUDIOLOGY | Facility: CLINIC | Age: 80
End: 2020-11-09
Payer: MEDICARE

## 2020-11-09 ENCOUNTER — TELEPHONE (OUTPATIENT)
Dept: AUDIOLOGY | Facility: CLINIC | Age: 80
End: 2020-11-09

## 2020-11-09 DIAGNOSIS — H90.3 SENSORY HEARING LOSS, BILATERAL: Primary | ICD-10-CM

## 2020-11-09 PROCEDURE — V5266 BATTERY FOR HEARING DEVICE: HCPCS | Mod: GY | Performed by: AUDIOLOGIST

## 2020-11-09 NOTE — TELEPHONE ENCOUNTER
Tonay Yang was seen at the [facility] on 11/9/20 for hearing aid services.  She asked to have the tubing replaced on her earmold.  The hearing aid and earmold were cleaned and the tubing was replaced.  The hearing aid was found to be working normally.  Tonya reported a comfortable fit and good sound quality.  She was given 24 size 13 batteries, which will be billed to her insurance.  She will return to the clinic as needed.

## 2020-11-29 ENCOUNTER — OFFICE VISIT (OUTPATIENT)
Dept: URGENT CARE | Facility: URGENT CARE | Age: 80
End: 2020-11-29
Payer: MEDICARE

## 2020-11-29 VITALS
HEIGHT: 65 IN | TEMPERATURE: 98.2 F | OXYGEN SATURATION: 96 % | WEIGHT: 175 LBS | SYSTOLIC BLOOD PRESSURE: 138 MMHG | BODY MASS INDEX: 29.16 KG/M2 | DIASTOLIC BLOOD PRESSURE: 72 MMHG | HEART RATE: 88 BPM

## 2020-11-29 DIAGNOSIS — M65.90 SYNOVITIS AND TENOSYNOVITIS: Primary | ICD-10-CM

## 2020-11-29 DIAGNOSIS — L03.011 PARONYCHIA OF RIGHT MIDDLE FINGER: ICD-10-CM

## 2020-11-29 PROCEDURE — 99214 OFFICE O/P EST MOD 30 MIN: CPT | Performed by: STUDENT IN AN ORGANIZED HEALTH CARE EDUCATION/TRAINING PROGRAM

## 2020-11-29 RX ORDER — CEPHALEXIN 500 MG/1
500 CAPSULE ORAL 2 TIMES DAILY
Qty: 10 CAPSULE | Refills: 0 | Status: SHIPPED | OUTPATIENT
Start: 2020-11-29 | End: 2020-12-04

## 2020-11-29 ASSESSMENT — MIFFLIN-ST. JEOR: SCORE: 1274.67

## 2020-11-29 NOTE — PATIENT INSTRUCTIONS
Soak your finger in warm water with soap twice a day for 10 minutes.   Start antibiotics, keflex, if the redness gets worse or more painful.   If take the antibiotics, take twice a day with food for 5 days.    Patient Education     Paronychia of the Finger or Toe  Paronychia is an infection near a fingernail or toenail. It usually occurs when an opening in the cuticle or an ingrown toenail lets bacteria under the skin.   The infection will need to be drained if pus is present. If the infection has been caught early, you may need only antibiotic treatment. Healing will take about 1 to 2 weeks.   Home care  Follow these guidelines when caring for yourself at home:     Clean and soak the toe or finger. Do this 2 times a day for the first 3 days. To do so:  ? Soak your foot or hand in a tub of warm water for 5 minutes. Or hold your toe or finger under a faucet of warm running water for 5 minutes.  ? Clean any crust away with soap and water using a cotton swab.  ? Put antibiotic ointment on the infected area.    Change the dressing daily or any time it gets dirty.    If you were given antibiotics, take them as directed until they are all gone.    If your infection is on a toe, wear comfortable shoes with a lot of toe room. You can also wear open-toed sandals while your toe heals.    You may use over-the-counter medicine (acetaminophen or ibuprofen) to help with pain, unless another medicine was prescribed. If you have chronic liver or kidney disease, talk with your healthcare provider before using these medicines. Also talk with your provider if you've had a stomach ulcer or gastrointestinal bleeding.  Prevention  The following can prevent paronychia:     Don't cut or play with your cuticles at home. A healthy cuticle maintains a seal between your skin and nail and keeps out infection.    Don't bite your nails.    Don't suck on your thumbs or fingers.  Follow-up care  Follow up with your healthcare provider, or as  advised.   When to seek medical advice  Call your healthcare provider right away if any of these occur:     Redness, pain, or swelling of the finger or toe gets worse    You have trouble moving or bending the finger or toe    Red streaks in the skin leading away from the wound    Pus or fluid draining from the nail area    Fever of 100.4 F (38 C) or higher, or as directed by your provider  [employer] last reviewed this educational content on 7/1/2019 2000-2020 [facility]. [address] [place] [address]. All rights reserved. This information is not intended as a substitute for professional medical care. Always follow your healthcare professional's instructions.

## 2020-11-29 NOTE — PROGRESS NOTES
"Subjective     Tonya Yang is a 80 year old female who presents to clinic today for the following health issues:    HPI     Tonya is here today with her son with concern about a right finger infection. She reports a cut to her finger 2 days ago and now some redness surrounding it. She denies drainage. She denies fever. She is on chronic steroids for RA and weaning currently. She also has DM2. No other skin infections.     She also points out swelling and pain of her left 1st MCP and 2nd MCP on the left hand. She is wearing a brace for that. She endorses only using steroids historically for a diagnosis of RA. Her PCP manages her RA.     Review of Systems    ROS: 10 point ROS neg other than the symptoms noted above in the HPI.      Objective    /72   Pulse 88   Temp 98.2  F (36.8  C) (Tympanic)   Ht 1.651 m (5' 5\")   Wt 79.4 kg (175 lb)   LMP  (LMP Unknown)   SpO2 96%   BMI 29.12 kg/m    Body mass index is 29.12 kg/m .  Physical Exam   General Appearance: Non toxic, NAD  Eyes: no scleral injections, EOMI, PERRLA  HEENT: NCAT, nares patent, MMM, no oropharynx erythema, no oral lesions or petechiae.  Neck: No JVD, ROM intact, no nuchal rigidity    Respiratory: CTAB, no work of breathing, no crackles, wheezes, ronchi  Cardiovascular: RRR,normal S1, S2, no murmur, extremities wwp, cap refill < 2 secs   GI: soft, no distention, no ttp, no organomegaly, normoactive bs, no peritoneal signs    Lymph/Hematologic: no cervical LAD, no generalized LAD, no bruises   Skin: no rashes, lesions SEE MSK  Musculoskeletal: no peripheral edema, synovitis of left 2nd finger MCP, PIP, 1st MCP. Mild erythema of the medial aspect of right middle finger without fluid collection    Neurologic: A&OX3, CN 2-10 intact, no focal decifict, strength, sensation intact   Psychiatric: appropriate mood      Assessment & Plan     #Paronycia Right middle finger   W/o abscess of drainable fluid collection.   Warm soaks BID with expression "   Keflex BID X 5 days.   RTC if worsening pain, redness or swelling.    #Synovitis of MCP, PIP on Left Hand   Reports long standing hx of RA on steroid wean w/o DMARDS.   Rheumatology consulted for optimization of maitenence medications.     Kelsi Medrano MD  [facility]

## 2020-12-29 ENCOUNTER — OFFICE VISIT - HEALTHEAST (OUTPATIENT)
Dept: RHEUMATOLOGY | Facility: CLINIC | Age: 80
End: 2020-12-29

## 2020-12-29 DIAGNOSIS — M25.50 POLYARTHRALGIA: ICD-10-CM

## 2020-12-29 LAB
ALBUMIN SERPL-MCNC: 3.7 G/DL (ref 3.5–5)
ALT SERPL W P-5'-P-CCNC: 33 U/L (ref 0–45)
BASOPHILS # BLD AUTO: 0.1 THOU/UL (ref 0–0.2)
BASOPHILS NFR BLD AUTO: 1 % (ref 0–2)
CCP AB SER IA-ACNC: <0.5 U/ML
CREAT SERPL-MCNC: 0.8 MG/DL (ref 0.6–1.1)
EOSINOPHIL # BLD AUTO: 0.3 THOU/UL (ref 0–0.4)
EOSINOPHIL NFR BLD AUTO: 3 % (ref 0–6)
ERYTHROCYTE [DISTWIDTH] IN BLOOD BY AUTOMATED COUNT: 13.7 % (ref 11–14.5)
GFR SERPL CREATININE-BSD FRML MDRD: >60 ML/MIN/1.73M2
HCT VFR BLD AUTO: 34.9 % (ref 35–47)
HGB BLD-MCNC: 11.2 G/DL (ref 12–16)
LYMPHOCYTES # BLD AUTO: 4.2 THOU/UL (ref 0.8–4.4)
LYMPHOCYTES NFR BLD AUTO: 39 % (ref 20–40)
MCH RBC QN AUTO: 27.4 PG (ref 27–34)
MCHC RBC AUTO-ENTMCNC: 32.2 G/DL (ref 32–36)
MCV RBC AUTO: 85 FL (ref 80–100)
MONOCYTES # BLD AUTO: 1 THOU/UL (ref 0–0.9)
MONOCYTES NFR BLD AUTO: 9 % (ref 2–10)
NEUTROPHILS # BLD AUTO: 5.3 THOU/UL (ref 2–7.7)
NEUTROPHILS NFR BLD AUTO: 48 % (ref 50–70)
PLATELET # BLD AUTO: 297 THOU/UL (ref 140–440)
PMV BLD AUTO: 7.2 FL (ref 7–10)
RBC # BLD AUTO: 4.1 MILL/UL (ref 3.8–5.4)
RHEUMATOID FACT SERPL-ACNC: 23.6 IU/ML (ref 0–30)
WBC: 10.9 THOU/UL (ref 4–11)

## 2021-01-01 LAB — ANA SER QL: 0.2 U

## 2021-01-07 ENCOUNTER — AMBULATORY - HEALTHEAST (OUTPATIENT)
Dept: ADMINISTRATIVE | Facility: CLINIC | Age: 81
End: 2021-01-07

## 2021-01-07 DIAGNOSIS — M65.90 SYNOVITIS AND TENOSYNOVITIS: ICD-10-CM

## 2021-01-28 ENCOUNTER — COMMUNICATION - HEALTHEAST (OUTPATIENT)
Dept: SLEEP MEDICINE | Facility: CLINIC | Age: 81
End: 2021-01-28

## 2021-02-09 ENCOUNTER — TRANSFERRED RECORDS (OUTPATIENT)
Dept: HEALTH INFORMATION MANAGEMENT | Facility: CLINIC | Age: 81
End: 2021-02-09

## 2021-02-09 ENCOUNTER — OFFICE VISIT - HEALTHEAST (OUTPATIENT)
Dept: RHEUMATOLOGY | Facility: CLINIC | Age: 81
End: 2021-02-09

## 2021-02-09 DIAGNOSIS — M15.0 PRIMARY OSTEOARTHRITIS INVOLVING MULTIPLE JOINTS: ICD-10-CM

## 2021-02-09 DIAGNOSIS — Z79.899 HIGH RISK MEDICATION USE: ICD-10-CM

## 2021-02-09 DIAGNOSIS — M25.50 POLYARTHRALGIA: ICD-10-CM

## 2021-02-16 ENCOUNTER — TRANSFERRED RECORDS (OUTPATIENT)
Dept: HEALTH INFORMATION MANAGEMENT | Facility: CLINIC | Age: 81
End: 2021-02-16

## 2021-02-16 ENCOUNTER — OFFICE VISIT - HEALTHEAST (OUTPATIENT)
Dept: RHEUMATOLOGY | Facility: CLINIC | Age: 81
End: 2021-02-16

## 2021-02-16 DIAGNOSIS — M15.0 PRIMARY OSTEOARTHRITIS INVOLVING MULTIPLE JOINTS: ICD-10-CM

## 2021-03-08 ENCOUNTER — IMMUNIZATION (OUTPATIENT)
Dept: NURSING | Facility: CLINIC | Age: 81
End: 2021-03-08
Payer: MEDICARE

## 2021-03-08 PROCEDURE — 0001A PR COVID VAC PFIZER DIL RECON 30 MCG/0.3 ML IM: CPT

## 2021-03-08 PROCEDURE — 91300 PR COVID VAC PFIZER DIL RECON 30 MCG/0.3 ML IM: CPT

## 2021-03-29 ENCOUNTER — IMMUNIZATION (OUTPATIENT)
Dept: NURSING | Facility: CLINIC | Age: 81
End: 2021-03-29
Attending: INTERNAL MEDICINE
Payer: MEDICARE

## 2021-03-29 PROCEDURE — 91300 PR COVID VAC PFIZER DIL RECON 30 MCG/0.3 ML IM: CPT

## 2021-03-29 PROCEDURE — 0002A PR COVID VAC PFIZER DIL RECON 30 MCG/0.3 ML IM: CPT

## 2021-04-19 ENCOUNTER — TELEPHONE (OUTPATIENT)
Dept: AUDIOLOGY | Facility: CLINIC | Age: 81
End: 2021-04-19

## 2021-04-19 NOTE — TELEPHONE ENCOUNTER
Walk-in hearing aid services on 4/19/21: The patient reported multiple issues with her left hearing aid.  She felt the earmold tubing was too short, which along with mask usage, was causing her to easily drop and lose her hearing aid.  She also reported intermittent loud noises from her hearing aid and stated she was rapidly going through batteries.  A new, slightly longer, earmold tube was placed on her earmold today.  The hearing aid was cleaned and run through a moisture desiccator.  Some circuit noise could be heard afterwards during a listening check, but it's possible it's simply related to the patient's significant hearing loss and how the hearing aid is programmed.  Mara was given a new supply of size 13 batteries today.  She will schedule and appointment with an audiologist to address her questions and concerns.  She will likely require a loaner hearing aid if her device needs to be sent in for repair.

## 2021-04-21 ENCOUNTER — OFFICE VISIT (OUTPATIENT)
Dept: AUDIOLOGY | Facility: CLINIC | Age: 81
End: 2021-04-21
Payer: MEDICARE

## 2021-04-21 DIAGNOSIS — H90.3 SENSORY HEARING LOSS, BILATERAL: Primary | ICD-10-CM

## 2021-04-21 PROCEDURE — V5266 BATTERY FOR HEARING DEVICE: HCPCS | Mod: GY | Performed by: AUDIOLOGIST

## 2021-05-08 NOTE — PROGRESS NOTES
AUDIOLOGY REPORT    SUBJECTIVE:Mara Yang is a 80 year old female who was seen in the Audiology Clinic at the [facility] on 5/10/2021  for a hearing aid check. She was accompanied by her son. Previous results have revealed moderate to severe sensorineural hearing loss with 28% word recognition in the left ear and a complete hearing loss in the right ear. A cochlear implant was previously dicussed, but both the patient and the son note that the patient not ready to pursue that The patient's son expressed that the patient cannot proceed with a cochlear implant as he feels that she is not psychologically ready for something like this and the patient is worried she would lose the hearing she does have and not be able to hear anything. The patient currently utilizes a Matomy Money 300 Series behind-the-ear hearing aid for the left ear fit previously at another facility. Mara was seen on 4/19/21 for walk-in hearing aid services. At this time a new, slightly longer, earmold tube was placed on her earmold per her request as she felt  the earmold tubing was too short, which along with mask usage, was causing her to easily drop and lose her hearing aid.  She also reported intermittent loud noises from her hearing aid and stated she was rapidly going through batteries. today. The hearing aid was cleaned and run through a moisture desiccator.  Some circuit noise could be heard afterwards during a listening check, but it's possible it's simply related to the patient's significant hearing loss and how the hearing aid is programmed.     She returns today to address these concerns. She reports that would like her hearing aid checked again because she is still hearing noises and things are not clear. She also reports that her hearing aid is quite old and it may be best to look into getting a new one. She would like to get a new hearing aid today if possible.      OBJECTIVE: Mara's left Claudio  "300 series behind the ear hearing aid was cleaned and checked. An electroacoustic analysis found the hearing aid to be functioning appropriately. A significant time was spent discussing various options moving forward. Due to the significant difficulty she was having understanding me, she requested I write down what I was saying so she could read it instead. We utilized a blank word document and typed to the patient for the duration of the appointment. She reported this was much easier for her to follow along and she wished she could have this for all of her appointments. We discussed that there is an option to use CART services and have someone present at her appointments to type. A request will be sent to  services to inquire about this.     We discussed the option of sending the device in for repair to address possible distortion or looking into new technology. We discussed realistic expectations based on her hearing loss as well getting an updated hearing test to ensure there has not been a change in thresholds or word recognition that is causing the change in sound she is reporting since our testing appeared normal. Mara was worried about sending the device in for repair as she relies on it greatly for communication. We discussed the option of waiting to send the hearing aid in for repair until she got a new one since it was functioning properly on our tests and it was \"tolerable\" to keep using per the patient. I also offered a loaner device for her to use while her hearing aid is being repaired if she would like to send it in now and she would to utilize this option. A loaner Phonak Circalitero Q50 behind-the-ear hearing aid (sn:[id number])  was programmed for the left ear with the patient's current earmold. The loaner hearing aid was programmed to NAL-NL1 targets in the testbox. The patient reported the volume was too loud and overall gain was decreased about 10% for patient comfort.  A loaner agreement " "was signed.     She did comment that she was happy to have a loaner to use in the meantime, and she plans to use her current device as back up when she gets new technology. We discussed the option of looking into new technology. She wanted to be fit with a new hearing aid today. I explained that prior to getting a new hearing aid we would want to get an updated hearing test due to the possible decrease in hearing or word understanding. Following this, we could complete a hearing aid consultation and then order the device. We discussed that per her insurance, an order is required for the hearing test or it may not be covered. She wished to wait until she had an order and will request one from her primary care provider. She expressed that she hoped that a new hearing aid would \"make her hearing good again\", but noted that someone previously told her a better option would be a cochlear implant. I expressed agreement with this and we again talked about her decreased word recognition and realistic expectations. She is fearful that she would lose what hearing she has and only hear beeps if she got a cochlear implant. At this time, she would rather use a traditional hearing aid. I answered the patient's questions regarding a cochlear implant and she expressed understanding. I encouraged her to reach out with questions or concerns about this in the future if needed. I also mentioned using a remote microphone in the meantime to see if that was beneficial. She reported that she did not know what that was, but was interested in trying one. She would like to see if insurance would cover this when she gets new hearing aids. If it does not she would like a prior authorization request to be submitted for coverage.  We will discuss the remote microphone option further at the time of her consultation.     ASSESSMENT: A left hearing aid check was completed today and a left loaner hearing aid was fit. Loaner agreement was signed. " Changes to hearing aid were completed as outlined above.     PLAN:Mara will return for an updated hearing test to ensure her hearing or word recognition has not changed and to discuss updated technology. She will request an order from her primary care provider for the hearing test prior to this appointment. Mara will be contacted to  her hearing aid when it returns from repair. At this time, the loaner hearing aid will be returned and her earmold can be placed back onto her device.  Please call this clinic with any questions regarding today s appointment.    Sarah Grimaldo. CCC-A  Licensed Audiologist   [place] #[id number]

## 2021-05-10 ENCOUNTER — OFFICE VISIT (OUTPATIENT)
Dept: AUDIOLOGY | Facility: CLINIC | Age: 81
End: 2021-05-10
Payer: MEDICARE

## 2021-05-10 DIAGNOSIS — H90.3 SENSORINEURAL HEARING LOSS, BILATERAL: Primary | ICD-10-CM

## 2021-05-10 PROCEDURE — 92594 PR ELECTROACOUSTIC HEARING AID TEST: CPT | Mod: GY | Performed by: AUDIOLOGIST

## 2021-05-10 PROCEDURE — 92592 PR HEARING AID CHECK, MONAURAL: CPT | Mod: GY | Performed by: AUDIOLOGIST

## 2021-05-25 ENCOUNTER — MEDICAL CORRESPONDENCE (OUTPATIENT)
Dept: HEALTH INFORMATION MANAGEMENT | Facility: CLINIC | Age: 81
End: 2021-05-25

## 2021-05-25 ENCOUNTER — OFFICE VISIT (OUTPATIENT)
Dept: AUDIOLOGY | Facility: CLINIC | Age: 81
End: 2021-05-25
Payer: MEDICARE

## 2021-05-25 DIAGNOSIS — H90.3 SENSORY HEARING LOSS, BILATERAL: Primary | ICD-10-CM

## 2021-05-25 PROCEDURE — V5257 HEARING AID, DIGIT, MON, BTE: HCPCS | Mod: GY | Performed by: AUDIOLOGIST

## 2021-06-05 VITALS
SYSTOLIC BLOOD PRESSURE: 130 MMHG | HEART RATE: 88 BPM | DIASTOLIC BLOOD PRESSURE: 60 MMHG | BODY MASS INDEX: 30.79 KG/M2 | HEIGHT: 65 IN

## 2021-06-05 VITALS
SYSTOLIC BLOOD PRESSURE: 144 MMHG | WEIGHT: 185 LBS | BODY MASS INDEX: 30.79 KG/M2 | DIASTOLIC BLOOD PRESSURE: 84 MMHG | HEART RATE: 76 BPM

## 2021-06-14 NOTE — TELEPHONE ENCOUNTER
Patient's appt was bumped 2/1.  Patient is having hand issues and can't wait until 3/8 to follow up.  She is still waiting to hear about the test results and future direction.

## 2021-06-14 NOTE — PROGRESS NOTES
"This document was created using a software with less than 100% fidelity, at times resulting in unintended, even erroneous syntax and grammar.  The reader is advised to keep this under consideration while reviewing, interpreting this note.    ASSESSMENT AND PLAN:  Mara Yang 80 y.o. 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  She noted diagnosis of spinal stenosis.  We discussed how there are variety of reasons why she might experience pain in her joints.  This needs to be characterized carefully.  We will start off with a work-up as noted once those are available further course to be charted accordingly.  Follow-up appointment in the next 3 weeks.  Diagnoses and all orders for this visit:    Polyarthralgia  -     Rheumatoid Factor Quant  -     CCP Antibodies  -     Antinuclear Antibody (MARIELA) Cascade  -     XR Hands Bilateral 3 or More VWS; Future; Expected date: 12/29/2020  -     XR Feet Bilateral 3 Or More VWS; Future; Expected date: 12/29/2020  -     XR Knees Bilateral 1 Or 2 VWS; Future; Expected date: 12/29/2020  -     XR Hands Bilateral 3 or More VWS  -     XR Feet Bilateral 3 Or More VWS  -     XR Knees Bilateral 1 Or 2 VWS  -     XR Ankles Bilateral 3 Or More Vws; Future; Expected date: 12/29/2020  -     XR Ankles Bilateral 3 Or More Vws  -     HM1(CBC and Differential)  -     Creatinine  -     ALT (SGPT)  -     Albumin      HISTORY OF PRESENTING ILLNESS:  Mara Yang, 80 y.o., female is here for evaluation of painful joints.  She reports that this is gone on for the past 15 possibly longer years.  She carries a diagnosis of rheumatoid arthritis that was made in [place].  She also has a diagnosis of spinal " "stenosis for which she is \"back injections\".  Her worst of the symptoms are in the lower back with activity, laying down, burning searing pain.  Besides the back issue she has noted pain in multiple joints including her hands, shoulders, knees and ankles.  These have troubled her intermittently.  She noted that the left side is typically worse side.  She has noted pain in her left second MCP where the slight-makes it painful, burning type sensation, she has had left ankle left knee pain.  She is not sure about the swelling.  No no history of recent trauma but 10 years ago she fell numbing down the steps and her son wonders if might have injured her ankle at that time.  She has been on prednisone for \"30 years\".  She recalls that she had developed sudden onset of hearing loss.  She was seen at the local ENT clinic.  Initially she was thought to have Ménière's.  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  She was given prednisone and methotrexate and is now been residually on prednisone since 1980s.  Over the years it was thought that the prednisone would be helpful for her joint symptoms.  She reports no personal or family history of psoriasis ulcerative colitis Crohn's disease.  She follows up with podiatry for her left foot pain..  Further historical information and ADL limitations as noted in the multidimensional health assessment questionnaire attached in the EMR. Rest of the 13 system ROS is negative.     Following is the excerpt from a past ENT note:       HISTORY OF PRESENT ILLNESS: HISTORY OF PRESENT ILLNESS: The patient is a 75-year-old in today with her son. She has had a long history of severe hearing loss in both ears; worse on the right. She lived in [place] and began noticing significant hearing issues in the mid 1980s. She was seen at the [facility] in [place] and was diagnosed with initially right Meniere's. She had a full workup including MRI scan which was negative. She lost the hearing " in the right ear and then began noticing progressive loss on the left. 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 She was treated with several medications including methotrexate and varying doses of steroid bursts and long-term steroid. Methotrexate was not helpful. She has been on 10 mg of prednisone once a day for many years. She moved to [place] eight years ago to live by her son. She feels the left hearing has continued to get worse, but we do not have any audiograms from before. They are in today to see if anything new has been developed and could be done. They have been considered for cochlear implant before, but she has not been interested in that. There has been no pain or drainage in the ear. 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     IMPRESSION AND PLAN: I talked with her and her son for some time. She has a profound hearing loss in her left and only hearing ear. She has a hearing aid there, but that is over eight years old. I talked to him that really the only thing we can do for the sensorineural hearing loss is consider hearing aid and try to maximize the benefit she receives from a hearing aid. I am concerned whether that is going to be enough with the profound loss she has with only 28% discrimination level. I talked to them about a cochlear implant, which they have been discussed with before and they do not seem interested at all in cochlear implant. We decided to go ahead and get him over to Audiology and see if there is something they can do with a newer set of hearing aids. If they come up empty handed we will have to see them back for discussion on a cochlear implant if they desire. I will have them see one of the cochlear implant surgeons at that point if indeed they are interested    ALLERGIES:Patient has no allergy information on record.    PAST MEDICAL/ACTIVE  PROBLEMS/MEDICATION/ FAMILY HISTORY/SOCIAL DATA:  The patient has a family history of  No past medical history on file.  Social History     Tobacco Use   Smoking Status Not on file     There is no problem list on file for this patient.    No current outpatient medications on file.     No current facility-administered medications for this visit.        COMPREHENSIVE EXAMINATION:  There were no vitals filed for this visit.  A well appearing alert oriented female. Vital data as noted above.  She is very hard of hearing.  Accompanied by her son who facilitates. Her eyes without inflammation/scleromalacia. ENTwithout oral mucositis, thrush, nasal deformity, external ear redness, deformity. Her neck is without lymphadenopathy and supple. Lungs normal sounds, no pleural rub. Heart auscultation normal rate, rhythm; no pericardial rub and murmurs. Abdomen soft, non tender, no organomegaly. Skin examined for heliotrope, malar area eruption, lupus pernio, periungual erythema, sclerodactyly, papules, erythema nodosum, purpura, nail pitting, onycholysis, and obvious psoriasis lesion. Neurological examination shows normal alertness, speech, facial symmetry, tone and power in upper and lower extremities. The joint examination is performed for swelling, tenderness, warmth, erythema, and range of motion in the following joints: DIPs, PIPs, MCPs, wrists, first CMC's, elbows, shoulders, hips, knees, ankles, feet; spine for range of motion and paraspinal muscles for tenderness. The salient  findings are: Left second MCP tenderness, left knee joint has lost the final 10 degrees of flexion, left ankle is tender and warm with surgical swelling.  There is no synovitis in any of the other palpable joints of upper or lower extremities.  She does not have dactylitis of digits or toes.  She has minimal impingement of the left shoulder.    LAB / IMAGING DATA:  No results found for: ALT, ALBUMIN, CREATININE    No results found for: WBC, HGB,  PLT    No results found for: MARIELA, RF, SEDRATE

## 2021-06-14 NOTE — TELEPHONE ENCOUNTER
spoke to Davie- pts son, relayed lab and xrays results per Dr Goodman. Labs were WNL or stable, xrays showed OA changes. Davie states pt is still having a lot of pain in her hands and some hand deformities. Davie would liek to get pt in sooner to discuss her symptoms more with Dr Goodman and to get any treatment she could that would help her pain. Explained that cortisone injections may be an option. Rescheduled pts appt to 2/16 at 2:45pm in person with Dr Goodman so he can further evaluate pts joints and possibly do a cortisone shot if appropriate

## 2021-06-15 NOTE — PROGRESS NOTES
This document was created using a software with less than 100% fidelity, at times resulting in unintended, even erroneous syntax and grammar.  The reader is advised to keep this under consideration while reviewing, interpreting this note.      ASSESSMENT AND PLAN:  Mara Yang 80 y.o. female is seen here on 02/16/21 for evaluation of worsening pain in her left hand epicentered at the second MCP, she would like to try corticosteroid injection pros and cons were outlined discussed.  20 mg of Kenalog injected ultrasound guidance after pros and cons were reviewed.  Follow-up in 3 to 4 months or sooner.  Diagnoses and all orders for this visit:    Primary osteoarthritis involving multiple joints  -     triamcinolone acetonide 40 mg/mL injection 20 mg (KENALOG-40)          HISTORY OF PRESENTING ILLNESS ON 02/16/21 :  Mara Yang 80 y.o. is here for a moderately severe flare up of pain.  She has widespread osteoarthritis.  One of the concerns is the apparent diagnosis of inflammatory joint disease or rheumatoid arthritis made elsewhere, while she is on prednisone 5 to 10 mg daily for her in her ear related hearing impairment.  Here for a moderately severe flare up of pain.  Joints affected include the left MCP(s): 2nd. This has gone on for several weeks. Pain is described as sharp. It is worse with activity at times bedtime..  Her symptoms are moderately severe. The symptoms are progressive.  Associated findings  do not include: swelling, rash.  There is no associated recent fall or trauma.  Over-the-counter treatment to date has been without significant relief.    Further historical information, including ROS and limitation in activities as noted in the multidimensional health assessment questionnaire scanned in the EMR and in the assessment and plan section.    ALLERGIES:Propofol and Shellfish containing products    PAST MEDICAL/ACTIVE PROBLEMS/MEDICATION/SOCIAL DATA  No past medical history on file.  Social History  "    Tobacco Use   Smoking Status Former Smoker   Smokeless Tobacco Former User     There is no problem list on file for this patient.    Current Outpatient Medications   Medication Sig Dispense Refill     ACCU-CHEK FASTCLIX LANCING DEV Kit FOR TESTING ONCE DAILY. DX  E11.9 TYPE 2 DIABETES       allopurinoL (ZYLOPRIM) 100 MG tablet Take 100 mg by mouth daily.       amLODIPine (NORVASC) 2.5 MG tablet Take 2.5 mg by mouth daily.       atorvastatin (LIPITOR) 10 MG tablet Take 10 mg by mouth daily.       atorvastatin (LIPITOR) 20 MG tablet Take 20 mg by mouth daily.       CONTOUR TEST STRIPS strips TESTING EVERY DAY DX  E11.9       diazePAM (VALIUM) 2 MG tablet Take 2 mg by mouth as needed.       divalproex (DEPAKOTE DR) 500 MG 12 hour tablet Take 500 mg by mouth daily.       DULoxetine (CYMBALTA) 60 MG capsule Take 60 mg by mouth daily.       glimepiride (AMARYL) 2 MG tablet Take 2 mg by mouth daily.       glipiZIDE (GLUCOTROL) 5 MG tablet Take 5 mg by mouth daily.       hydrOXYzine HCL (ATARAX) 25 MG tablet Take 25 mg by mouth daily.       JANUVIA 100 mg tablet Take 100 mg by mouth daily.       lamoTRIgine (LAMICTAL) 100 MG tablet Take 100 mg by mouth 2 (two) times a day.       meloxicam (MOBIC) 7.5 MG tablet Take 7.5 mg by mouth as needed.       metFORMIN (GLUCOPHAGE-XR) 500 MG 24 hr tablet Take 1,000 mg by mouth 2 (two) times a day.       omeprazole (PRILOSEC) 20 MG capsule Take 20 mg by mouth daily before breakfast.       oxybutynin (DITROPAN XL) 10 MG ER tablet Take 10 mg by mouth daily.       oxyCODONE-acetaminophen (PERCOCET/ENDOCET) 5-325 mg per tablet        predniSONE (DELTASONE) 5 MG tablet Take 5 mg by mouth daily.       No current facility-administered medications for this visit.          DETAILED EXAMINATION  02/16/21  :  Vitals:    02/16/21 1456   BP: 130/60   Pulse: 88   Height: 5' 5\" (1.651 m)     Alert oriented. Head including the face is examined for malar rash, heliotropes, scarring, lupus pernio. " Eyes examined for redness such as in episcleritis/scleritis, periorbital lesions.   Neck/ Face examined for parotid gland swelling, range of motion of neck.  Left upper and lower and right upper and lower extremities examined for tenderness, swelling, warmth of the appendicular joints, range of motion, edema, rash.  Some of the important findings included: She has tenderness at the left second MCP with subtle swelling.           LAB / IMAGING DATA:  ALT   Date Value Ref Range Status   12/29/2020 33 0 - 45 U/L Final     Albumin   Date Value Ref Range Status   12/29/2020 3.7 3.5 - 5.0 g/dL Final     Creatinine   Date Value Ref Range Status   12/29/2020 0.80 0.60 - 1.10 mg/dL Final       WBC   Date Value Ref Range Status   12/29/2020 10.9 4.0 - 11.0 thou/uL Final     Hemoglobin   Date Value Ref Range Status   12/29/2020 11.2 (L) 12.0 - 16.0 g/dL Final     Platelets   Date Value Ref Range Status   12/29/2020 297 140 - 440 thou/uL Final       Lab Results   Component Value Date    RF 23.6 12/29/2020

## 2021-06-15 NOTE — PROGRESS NOTES
"Mara Yang is a 80 y.o. female who is being evaluated via a billable video visit.      How would you like to obtain your AVS? Mail a copy.  If dropped from the video visit, the video invitation should be resent by: Text to cell phone: [phone]  Will anyone else be joining your video visit? No        Video-Visit Details    Type of service:  Video Visit  Call duration 11 minutes, call started 10:08 AM  Originating Location (pt. Location): Home    Distant Location (provider location):  [facility]     Platform used for Video Visit: SnapTell    This document was created using a software with less than 100% fidelity, at times resulting in unintended, even erroneous syntax and grammar.  The reader is advised to keep this under consideration while reviewing, interpreting this note.           ASSESSMENT AND PLAN:    Diagnoses and all orders for this visit:    Polyarthralgia    Primary osteoarthritis involving multiple joints    High risk medication use          HISTORY OF PRESENTING ILLNESS:  Mara Yang 80 y.o. is evaluated here via video link.  This is for painful joints in her hands.  She was seen in her recently.  She has carried a diagnosis of rheumatoid arthritis from elsewhere.  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  Last month she reports getting a high-dose of prednisone that did nothing to help her joint symptoms.  This time she has noted worsening swelling around the wrist, back of the hand, she is going to start a high dose of prednisone for upcoming dental work.  It is not clear how that works with the prednisone however she is going to take 40 mg for 3 days then 30 mg and so on.  I have outlined to her that there is ample evidence of osteoarthritis.  The key question of if she has rheumatoid arthritis remains under consideration.  As she already has an appointment next week.  I have asked her to document how D elsewhere prescribe higher " dose of prednisone to help helps her.       ROS enquiry held for fever, ocular symptoms, rash, headache,  GI issues.  Today we also discussed the issues related to the current pandemic, the pros and cons of the current treatment plan, the CDC guidelines such as social distancing washing the hands covering the cough.  ALLERGIES:Propofol and Shellfish containing products    PAST MEDICAL/ACTIVE PROBLEMS/MEDICATION/SOCIAL DATA  No past medical history on file.  Social History     Tobacco Use   Smoking Status Former Smoker   Smokeless Tobacco Former User     There is no problem list on file for this patient.    Current Outpatient Medications   Medication Sig Dispense Refill     ACCU-CHEK FASTCLIX LANCING DEV Kit FOR TESTING ONCE DAILY. DX  E11.9 TYPE 2 DIABETES       allopurinoL (ZYLOPRIM) 100 MG tablet Take 100 mg by mouth daily.       amLODIPine (NORVASC) 2.5 MG tablet Take 2.5 mg by mouth daily.       atorvastatin (LIPITOR) 10 MG tablet Take 10 mg by mouth daily.       atorvastatin (LIPITOR) 20 MG tablet Take 20 mg by mouth daily.       CONTOUR TEST STRIPS strips TESTING EVERY DAY DX  E11.9       diazePAM (VALIUM) 2 MG tablet Take 2 mg by mouth as needed.       divalproex (DEPAKOTE DR) 500 MG 12 hour tablet Take 500 mg by mouth daily.       DULoxetine (CYMBALTA) 60 MG capsule Take 60 mg by mouth daily.       glimepiride (AMARYL) 2 MG tablet Take 2 mg by mouth daily.       glipiZIDE (GLUCOTROL) 5 MG tablet Take 5 mg by mouth daily.       hydrOXYzine HCL (ATARAX) 25 MG tablet Take 25 mg by mouth daily.       JANUVIA 100 mg tablet Take 100 mg by mouth daily.       lamoTRIgine (LAMICTAL) 100 MG tablet Take 100 mg by mouth 2 (two) times a day.       meloxicam (MOBIC) 7.5 MG tablet Take 7.5 mg by mouth as needed.       metFORMIN (GLUCOPHAGE-XR) 500 MG 24 hr tablet Take 1,000 mg by mouth 2 (two) times a day.       omeprazole (PRILOSEC) 20 MG capsule Take 20 mg by mouth daily before breakfast.       oxybutynin (DITROPAN XL)  10 MG ER tablet Take 10 mg by mouth daily.       oxyCODONE-acetaminophen (PERCOCET/ENDOCET) 5-325 mg per tablet        predniSONE (DELTASONE) 5 MG tablet Take 5 mg by mouth daily.       No current facility-administered medications for this visit.          EXAMINATION:    Using the audio and video link as best as possible the constitutional, neck, neurologic, psych, skin, both upper extremities areas/organ system were evaluated during this assessment.  Some of the important findings: Alert, oriented, speech fluent.   Able to fully flex the digits, into fists bilaterally, wrist and elbow elbow range of motion appear normal, abduction of the shoulder is normal.      LAB / IMAGING DATA:  ALT   Date Value Ref Range Status   12/29/2020 33 0 - 45 U/L Final     Albumin   Date Value Ref Range Status   12/29/2020 3.7 3.5 - 5.0 g/dL Final     Creatinine   Date Value Ref Range Status   12/29/2020 0.80 0.60 - 1.10 mg/dL Final       WBC   Date Value Ref Range Status   12/29/2020 10.9 4.0 - 11.0 thou/uL Final     Hemoglobin   Date Value Ref Range Status   12/29/2020 11.2 (L) 12.0 - 16.0 g/dL Final     Platelets   Date Value Ref Range Status   12/29/2020 297 140 - 440 thou/uL Final       Lab Results   Component Value Date    RF 23.6 12/29/2020

## 2021-06-22 ENCOUNTER — TELEPHONE (OUTPATIENT)
Dept: AUDIOLOGY | Facility: CLINIC | Age: 81
End: 2021-06-22

## 2021-06-22 NOTE — TELEPHONE ENCOUNTER
Walk-in hearing aid services on 6/22/21: Patient picked up her repaired left Claudio BTE and returned her loaner Phonak BTE today.  Left earmold was cleaned and attached to the Claudio BTE.  Patient will return to the clinic on 7/13 for an HAE appointment.

## 2021-07-13 ENCOUNTER — OFFICE VISIT (OUTPATIENT)
Dept: RHEUMATOLOGY | Facility: CLINIC | Age: 81
End: 2021-07-13
Payer: MEDICARE

## 2021-07-13 VITALS — SYSTOLIC BLOOD PRESSURE: 140 MMHG | HEART RATE: 88 BPM | DIASTOLIC BLOOD PRESSURE: 70 MMHG

## 2021-07-13 DIAGNOSIS — M25.50 POLYARTHRALGIA: ICD-10-CM

## 2021-07-13 DIAGNOSIS — M77.8 LEFT SHOULDER TENDINITIS: ICD-10-CM

## 2021-07-13 DIAGNOSIS — M15.0 PRIMARY OSTEOARTHRITIS INVOLVING MULTIPLE JOINTS: Primary | ICD-10-CM

## 2021-07-13 PROCEDURE — 20610 DRAIN/INJ JOINT/BURSA W/O US: CPT | Mod: LT | Performed by: INTERNAL MEDICINE

## 2021-07-13 PROCEDURE — 99214 OFFICE O/P EST MOD 30 MIN: CPT | Mod: 25 | Performed by: INTERNAL MEDICINE

## 2021-07-13 PROCEDURE — 20600 DRAIN/INJ JOINT/BURSA W/O US: CPT | Mod: 59 | Performed by: INTERNAL MEDICINE

## 2021-07-13 RX ORDER — CARVEDILOL 25 MG/1
TABLET, FILM COATED ORAL
COMMUNITY
Start: 2020-11-23

## 2021-07-13 RX ORDER — MECOBALAMIN 5000 MCG
TABLET,DISINTEGRATING ORAL
Status: ON HOLD | COMMUNITY
End: 2021-07-20

## 2021-07-13 RX ORDER — ALLOPURINOL 100 MG/1
100 TABLET ORAL
Status: ON HOLD | COMMUNITY
Start: 2020-12-11 | End: 2021-07-20

## 2021-07-13 RX ORDER — HYDROXYZINE PAMOATE 25 MG/1
25 CAPSULE ORAL
Status: ON HOLD | COMMUNITY
Start: 2020-08-20 | End: 2021-07-20

## 2021-07-13 RX ORDER — HYDROCORTISONE VALERATE 2 MG/G
OINTMENT TOPICAL
Status: ON HOLD | COMMUNITY
Start: 2020-05-28 | End: 2021-07-20

## 2021-07-13 RX ORDER — EMPAGLIFLOZIN 25 MG/1
25 TABLET, FILM COATED ORAL EVERY MORNING
Status: ON HOLD | COMMUNITY
Start: 2021-07-06 | End: 2024-02-08

## 2021-07-13 RX ORDER — NYSTATIN 100000 [USP'U]/G
POWDER TOPICAL 2 TIMES DAILY PRN
Status: ON HOLD | COMMUNITY
Start: 2021-05-31 | End: 2022-04-14

## 2021-07-13 RX ORDER — TRIAMCINOLONE ACETONIDE 40 MG/ML
40 INJECTION, SUSPENSION INTRA-ARTICULAR; INTRAMUSCULAR ONCE
Qty: 1 ML | Refills: 0 | OUTPATIENT
Start: 2021-07-13 | End: 2021-07-14

## 2021-07-13 RX ORDER — OXYBUTYNIN CHLORIDE 10 MG/1
15 TABLET, EXTENDED RELEASE ORAL AT BEDTIME
COMMUNITY
Start: 2020-11-27 | End: 2022-04-04 | Stop reason: ALTCHOICE

## 2021-07-13 RX ORDER — LIDOCAINE 50 MG/G
PATCH TOPICAL
Status: ON HOLD | COMMUNITY
Start: 2020-03-21 | End: 2021-07-20

## 2021-07-13 RX ORDER — DIVALPROEX SODIUM 500 MG/1
500 TABLET, DELAYED RELEASE ORAL
Status: ON HOLD | COMMUNITY
End: 2021-07-20

## 2021-07-13 RX ORDER — GLIPIZIDE 5 MG/1
5 TABLET ORAL
Status: ON HOLD | COMMUNITY
Start: 2021-06-27 | End: 2021-07-26

## 2021-07-13 RX ORDER — TRIAMCINOLONE ACETONIDE 40 MG/ML
INJECTION, SUSPENSION INTRA-ARTICULAR; INTRAMUSCULAR
Qty: 0.25 ML | Refills: 0 | OUTPATIENT
Start: 2021-07-13 | End: 2021-07-14

## 2021-07-13 RX ORDER — LANCING DEVICE/LANCETS
KIT MISCELLANEOUS
COMMUNITY
Start: 2020-09-02

## 2021-07-13 RX ORDER — TRIAMCINOLONE ACETONIDE 1 MG/G
CREAM TOPICAL 2 TIMES DAILY PRN
COMMUNITY
End: 2024-01-28

## 2021-07-13 RX ORDER — HYDROXYZINE HYDROCHLORIDE 25 MG/1
25 TABLET, FILM COATED ORAL
COMMUNITY
Start: 2020-10-01 | End: 2022-03-07

## 2021-07-13 RX ADMIN — TRIAMCINOLONE ACETONIDE 40 MG: 40 INJECTION, SUSPENSION INTRA-ARTICULAR; INTRAMUSCULAR at 04:23

## 2021-07-13 RX ADMIN — TRIAMCINOLONE ACETONIDE 12 MG: 40 INJECTION, SUSPENSION INTRA-ARTICULAR; INTRAMUSCULAR at 04:29

## 2021-07-13 NOTE — PROGRESS NOTES
This document was created using a software with less than 100% fidelity, at times resulting in unintended, even erroneous syntax and grammar.  The reader is advised to keep this under consideration while reviewing, interpreting this note.    Mara was seen today for recheck.    Diagnoses and all orders for this visit:    Primary osteoarthritis involving multiple joints  -     Discontinue: triamcinolone (KENALOG) 40 MG/ML injection; 1 mL (40 mg) by INTRA-ARTICULAR route once for 1 dose  -     Discontinue: triamcinolone (KENALOG) 40 MG/ML injection; 10 mg intraarticular  -     ASPIRATION/INJECTION SMALL JOINT  -     triamcinolone (KENALOG-40) injection 40 mg  -     triamcinolone (KENALOG-40) injection 12 mg    Polyarthralgia  -     Discontinue: triamcinolone (KENALOG) 40 MG/ML injection; 1 mL (40 mg) by INTRA-ARTICULAR route once for 1 dose  -     ASPIRATION/INJECTION MAJOR JOINT  -     ASPIRATION/INJECTION SMALL JOINT    Left shoulder tendinitis  -     Discontinue: triamcinolone (KENALOG) 40 MG/ML injection; 1 mL (40 mg) by INTRA-ARTICULAR route once for 1 dose  -     ASPIRATION/INJECTION MAJOR JOINT            HISTORY OF PRESENTING ILLNESS ON 02/16/21 :  Mara Yang 80 y.o. is here for a moderately severe flare up of pain accompanied by her son.  She has widespread osteoarthritis.  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    Joints affected include the left MCP(s): 2nd and the left shoulder. This has gone on for several weeks. Pain is described as sharp. It is worse with activity at times bedtime..  Her symptoms are moderately severe. The symptoms are progressive.  Associated findings  do not include: swelling, rash.  There is no associated recent fall or trauma.  Over-the-counter treatment to date has  been without significant relief.    Further historical information, including ROS and limitation in activities as noted in the multidimensional health assessment questionnaire scanned in the EMR and in the assessment and plan section.    ALLERGIES:Propofol and Shellfish containing products    PAST MEDICAL/ACTIVE PROBLEMS/MEDICATION/SOCIAL DATA  No past medical history on file.  Social History     Tobacco Use   Smoking Status Former Smoker   Smokeless Tobacco Former User     There is no problem list on file for this patient.    Current Outpatient Medications   Medication Sig Dispense Refill     ACCU-CHEK FASTCLIX LANCING DEV Kit FOR TESTING ONCE DAILY. DX  E11.9 TYPE 2 DIABETES       allopurinoL (ZYLOPRIM) 100 MG tablet Take 100 mg by mouth daily.       amLODIPine (NORVASC) 2.5 MG tablet Take 2.5 mg by mouth daily.       atorvastatin (LIPITOR) 10 MG tablet Take 10 mg by mouth daily.       atorvastatin (LIPITOR) 20 MG tablet Take 20 mg by mouth daily.       CONTOUR TEST STRIPS strips TESTING EVERY DAY DX  E11.9       diazePAM (VALIUM) 2 MG tablet Take 2 mg by mouth as needed.       divalproex (DEPAKOTE DR) 500 MG 12 hour tablet Take 500 mg by mouth daily.       DULoxetine (CYMBALTA) 60 MG capsule Take 60 mg by mouth daily.       glimepiride (AMARYL) 2 MG tablet Take 2 mg by mouth daily.       glipiZIDE (GLUCOTROL) 5 MG tablet Take 5 mg by mouth daily.       hydrOXYzine HCL (ATARAX) 25 MG tablet Take 25 mg by mouth daily.       JANUVIA 100 mg tablet Take 100 mg by mouth daily.       lamoTRIgine (LAMICTAL) 100 MG tablet Take 100 mg by mouth 2 (two) times a day.       meloxicam (MOBIC) 7.5 MG tablet Take 7.5 mg by mouth as needed.       metFORMIN (GLUCOPHAGE-XR) 500 MG 24 hr tablet Take 1,000 mg by mouth 2 (two) times a day.       omeprazole (PRILOSEC) 20 MG capsule Take 20 mg by mouth daily before breakfast.       oxybutynin (DITROPAN XL) 10 MG ER tablet Take 10 mg by mouth daily.       oxyCODONE-acetaminophen  (PERCOCET/ENDOCET) 5-325 mg per tablet        predniSONE (DELTASONE) 5 MG tablet Take 5 mg by mouth daily.       No current facility-administered medications for this visit.        DETAILED EXAMINATION  07/13/21  :  Vitals:    07/13/21 1631   BP: (!) 140/70   Pulse: 88     Alert oriented. Head including the face is examined for malar rash, heliotropes, scarring, lupus pernio. Eyes examined for redness such as in episcleritis/scleritis, periorbital lesions.   Neck/ Face examined for parotid gland swelling, range of motion of neck.  Left upper and lower and right upper and lower extremities examined for tenderness, swelling, warmth of the appendicular joints, range of motion, edema, rash.  Some of the important findings included: she does not have evidence of synovitis in the palpable joints of the upper extremities.  No significant deformities of the digits.  She has Heberden's, she has painful abduction arc on the left shoulder, she has tenderness at the left second MCP.        WBC   Date Value Ref Range Status   12/29/2020 10.9 4.0 - 11.0 thou/uL Final   04/12/2016 8.9 4.0 - 11.0 10e9/L Final     RBC Count   Date Value Ref Range Status   12/29/2020 4.10 3.80 - 5.40 mill/uL Final   04/12/2016 3.99 3.8 - 5.2 10e12/L Final     Hemoglobin   Date Value Ref Range Status   12/29/2020 11.2 (L) 12.0 - 16.0 g/dL Final   10/24/2017 12.2 11.7 - 15.7 g/dL Final     Hematocrit   Date Value Ref Range Status   12/29/2020 34.9 (L) 35.0 - 47.0 % Final   04/12/2016 36.0 35.0 - 47.0 % Final     MCV   Date Value Ref Range Status   12/29/2020 85 80 - 100 fL Final   04/12/2016 90 78 - 100 fl Final     MCH   Date Value Ref Range Status   12/29/2020 27.4 27.0 - 34.0 pg Final   04/12/2016 26.1 (L) 26.5 - 33.0 pg Final     Platelet Count   Date Value Ref Range Status   12/29/2020 297 140 - 440 thou/uL Final   04/12/2016 287 150 - 450 10e9/L Final     % Lymphocytes   Date Value Ref Range Status   12/29/2020 39 20 - 40 % Final   04/12/2016  24.6 % Final     AST   Date Value Ref Range Status   02/14/2017 25 0 - 45 U/L Final     ALT   Date Value Ref Range Status   12/29/2020 33 0 - 45 U/L Final   02/14/2017 26 0 - 50 U/L Final     Albumin   Date Value Ref Range Status   12/29/2020 3.7 3.5 - 5.0 g/dL Final   02/14/2017 3.6 3.4 - 5.0 g/dL Final     Alkaline Phosphatase   Date Value Ref Range Status   02/14/2017 85 40 - 150 U/L Final     Creatinine   Date Value Ref Range Status   12/29/2020 0.80 0.60 - 1.10 mg/dL Final   10/24/2017 0.84 0.52 - 1.04 mg/dL Final     GFR Estimate   Date Value Ref Range Status   12/29/2020 >60 >60 mL/min/1.73m2 Final   10/24/2017 66 >60 mL/min/1.7m2 Final     Comment:     Non  GFR Calc     GFR Estimate If Black   Date Value Ref Range Status   12/29/2020 >60 >60 mL/min/1.73m2 Final   10/24/2017 80 >60 mL/min/1.7m2 Final     Comment:      GFR Calc     Creatinine Urine   Date Value Ref Range Status   02/14/2017 82 mg/dL Final     Sed Rate   Date Value Ref Range Status   10/23/2013 10 0 - 30 mm/h Final     C-Reactive Protein   Date Value Ref Range Status   04/21/2011 4.4 0.0 - 4.9 mg/L Final     CRP Inflammation   Date Value Ref Range Status   10/23/2013 8.4 (H) 0.0 - 8.0 mg/L Final

## 2021-07-14 RX ORDER — TRIAMCINOLONE ACETONIDE 40 MG/ML
10 INJECTION, SUSPENSION INTRA-ARTICULAR; INTRAMUSCULAR ONCE
Status: COMPLETED | OUTPATIENT
Start: 2021-07-14 | End: 2021-07-13

## 2021-07-14 RX ORDER — TRIAMCINOLONE ACETONIDE 40 MG/ML
40 INJECTION, SUSPENSION INTRA-ARTICULAR; INTRAMUSCULAR ONCE
Status: COMPLETED | OUTPATIENT
Start: 2021-07-14 | End: 2021-07-13

## 2021-07-19 ENCOUNTER — HOSPITAL ENCOUNTER (EMERGENCY)
Dept: MRI IMAGING | Facility: CLINIC | Age: 81
DRG: 638 | End: 2021-07-19
Attending: EMERGENCY MEDICINE
Payer: MEDICARE

## 2021-07-19 ENCOUNTER — HOSPITAL ENCOUNTER (INPATIENT)
Facility: CLINIC | Age: 81
LOS: 6 days | Discharge: HOME | DRG: 638 | End: 2021-07-26
Attending: EMERGENCY MEDICINE | Admitting: HOSPITALIST
Payer: MEDICARE

## 2021-07-19 DIAGNOSIS — G89.4 CHRONIC PAIN SYNDROME: ICD-10-CM

## 2021-07-19 DIAGNOSIS — R60.0 BILATERAL LEG EDEMA: ICD-10-CM

## 2021-07-19 DIAGNOSIS — H81.09 MENIERE'S DISEASE (COCHLEAR HYDROPS), UNSPECIFIED LATERALITY: ICD-10-CM

## 2021-07-19 DIAGNOSIS — M62.81 MUSCLE WEAKNESS (GENERALIZED): Primary | ICD-10-CM

## 2021-07-19 DIAGNOSIS — R41.0 CONFUSION: ICD-10-CM

## 2021-07-19 DIAGNOSIS — N39.0 LOWER URINARY TRACT INFECTIOUS DISEASE: ICD-10-CM

## 2021-07-19 DIAGNOSIS — R73.9 HYPERGLYCEMIA: ICD-10-CM

## 2021-07-19 DIAGNOSIS — E11.21 TYPE 2 DIABETES MELLITUS WITH DIABETIC NEPHROPATHY, WITHOUT LONG-TERM CURRENT USE OF INSULIN (H): ICD-10-CM

## 2021-07-19 DIAGNOSIS — I10 ESSENTIAL HYPERTENSION: ICD-10-CM

## 2021-07-19 DIAGNOSIS — N17.9 ACUTE RENAL FAILURE, UNSPECIFIED ACUTE RENAL FAILURE TYPE (H): ICD-10-CM

## 2021-07-19 LAB
BASE EXCESS BLDV CALC-SCNC: 2.4 MMOL/L
BASOPHILS # BLD AUTO: 0 10E3/UL (ref 0–0.2)
BASOPHILS NFR BLD AUTO: 0 %
EOSINOPHIL # BLD AUTO: 0 10E3/UL (ref 0–0.7)
EOSINOPHIL NFR BLD AUTO: 0 %
ERYTHROCYTE [DISTWIDTH] IN BLOOD BY AUTOMATED COUNT: 14.8 % (ref 10–15)
GLUCOSE BLDC GLUCOMTR-MCNC: 481 MG/DL (ref 70–125)
HCO3 BLDV-SCNC: 25 MMOL/L (ref 24–30)
HCT VFR BLD AUTO: 42.5 % (ref 35–47)
HGB BLD-MCNC: 12.5 G/DL (ref 11.7–15.7)
IMM GRANULOCYTES # BLD: 0.1 10E3/UL
IMM GRANULOCYTES NFR BLD: 1 %
LYMPHOCYTES # BLD AUTO: 2.8 10E3/UL (ref 0.8–5.3)
LYMPHOCYTES NFR BLD AUTO: 17 %
MCH RBC QN AUTO: 24.8 PG (ref 26.5–33)
MCHC RBC AUTO-ENTMCNC: 29.4 G/DL (ref 31.5–36.5)
MCV RBC AUTO: 84 FL (ref 78–100)
MONOCYTES # BLD AUTO: 1 10E3/UL (ref 0–1.3)
MONOCYTES NFR BLD AUTO: 6 %
NEUTROPHILS # BLD AUTO: 13.3 10E3/UL (ref 1.6–8.3)
NEUTROPHILS NFR BLD AUTO: 76 %
NRBC # BLD AUTO: 0 10E3/UL
NRBC BLD AUTO-RTO: 0 /100
OXYHGB MFR BLDV: 36 % (ref 70–75)
PCO2 BLDV: 46 MM HG (ref 35–50)
PH BLDV: 7.38 [PH] (ref 7.35–7.45)
PLATELET # BLD AUTO: 465 10E3/UL (ref 150–450)
PO2 BLDV: 26 MM HG (ref 25–47)
RBC # BLD AUTO: 5.05 10E6/UL (ref 3.8–5.2)
SAO2 % BLDV: 36.7 % (ref 70–75)
WBC # BLD AUTO: 17.3 10E3/UL (ref 4–11)

## 2021-07-19 PROCEDURE — 85025 COMPLETE CBC W/AUTO DIFF WBC: CPT | Performed by: FAMILY MEDICINE

## 2021-07-19 PROCEDURE — 99285 EMERGENCY DEPT VISIT HI MDM: CPT | Mod: 25

## 2021-07-19 PROCEDURE — 82140 ASSAY OF AMMONIA: CPT | Performed by: FAMILY MEDICINE

## 2021-07-19 PROCEDURE — 36592 COLLECT BLOOD FROM PICC: CPT | Performed by: FAMILY MEDICINE

## 2021-07-19 PROCEDURE — 82805 BLOOD GASES W/O2 SATURATION: CPT | Performed by: FAMILY MEDICINE

## 2021-07-19 PROCEDURE — 70549 MR ANGIOGRAPH NECK W/O&W/DYE: CPT

## 2021-07-19 PROCEDURE — 82248 BILIRUBIN DIRECT: CPT | Performed by: FAMILY MEDICINE

## 2021-07-19 PROCEDURE — 80048 BASIC METABOLIC PNL TOTAL CA: CPT | Performed by: FAMILY MEDICINE

## 2021-07-19 PROCEDURE — 83690 ASSAY OF LIPASE: CPT | Performed by: FAMILY MEDICINE

## 2021-07-19 PROCEDURE — 96374 THER/PROPH/DIAG INJ IV PUSH: CPT

## 2021-07-19 PROCEDURE — 250N000011 HC RX IP 250 OP 636: Performed by: EMERGENCY MEDICINE

## 2021-07-19 PROCEDURE — 70553 MRI BRAIN STEM W/O & W/DYE: CPT

## 2021-07-19 PROCEDURE — 82010 KETONE BODYS QUAN: CPT | Performed by: FAMILY MEDICINE

## 2021-07-19 PROCEDURE — 96361 HYDRATE IV INFUSION ADD-ON: CPT

## 2021-07-19 PROCEDURE — 36415 COLL VENOUS BLD VENIPUNCTURE: CPT | Performed by: FAMILY MEDICINE

## 2021-07-19 PROCEDURE — 83735 ASSAY OF MAGNESIUM: CPT | Performed by: FAMILY MEDICINE

## 2021-07-19 PROCEDURE — 84484 ASSAY OF TROPONIN QUANT: CPT | Performed by: FAMILY MEDICINE

## 2021-07-19 RX ORDER — LORAZEPAM 2 MG/ML
1 INJECTION INTRAMUSCULAR ONCE
Status: COMPLETED | OUTPATIENT
Start: 2021-07-20 | End: 2021-07-19

## 2021-07-19 RX ADMIN — LORAZEPAM 1 MG: 2 INJECTION INTRAMUSCULAR; INTRAVENOUS at 23:39

## 2021-07-19 ASSESSMENT — ENCOUNTER SYMPTOMS
NAUSEA: 1
DIZZINESS: 1
CONFUSION: 1

## 2021-07-20 ENCOUNTER — HOSPITAL ENCOUNTER (INPATIENT)
Dept: RADIOLOGY | Facility: CLINIC | Age: 81
DRG: 638 | End: 2021-07-20
Attending: HOSPITALIST
Payer: MEDICARE

## 2021-07-20 ENCOUNTER — HOSPITAL ENCOUNTER (INPATIENT)
Dept: ULTRASOUND IMAGING | Facility: CLINIC | Age: 81
DRG: 638 | End: 2021-07-20
Attending: HOSPITALIST
Payer: MEDICARE

## 2021-07-20 PROBLEM — N17.9 ACUTE RENAL FAILURE, UNSPECIFIED ACUTE RENAL FAILURE TYPE (H): Status: ACTIVE | Noted: 2021-07-20

## 2021-07-20 PROBLEM — R41.0 CONFUSION: Status: ACTIVE | Noted: 2021-07-20

## 2021-07-20 PROBLEM — R73.9 HYPERGLYCEMIA: Status: ACTIVE | Noted: 2021-07-20

## 2021-07-20 LAB
ALBUMIN SERPL-MCNC: 4.2 G/DL (ref 3.5–5)
ALBUMIN UR-MCNC: NEGATIVE MG/DL
ALP SERPL-CCNC: 159 U/L (ref 45–120)
ALT SERPL W P-5'-P-CCNC: 52 U/L (ref 0–45)
AMMONIA PLAS-SCNC: 57 UMOL/L (ref 11–35)
ANION GAP SERPL CALCULATED.3IONS-SCNC: 14 MMOL/L (ref 5–18)
APPEARANCE UR: CLEAR
AST SERPL W P-5'-P-CCNC: 28 U/L (ref 0–40)
BACTERIA #/AREA URNS HPF: ABNORMAL /HPF
BILIRUB DIRECT SERPL-MCNC: 0.2 MG/DL
BILIRUB SERPL-MCNC: 0.7 MG/DL (ref 0–1)
BILIRUB UR QL STRIP: NEGATIVE
BUN SERPL-MCNC: 38 MG/DL (ref 8–28)
CALCIUM SERPL-MCNC: 9.7 MG/DL (ref 8.5–10.5)
CHLORIDE BLD-SCNC: 93 MMOL/L (ref 98–107)
CO2 SERPL-SCNC: 26 MMOL/L (ref 22–31)
COLOR UR AUTO: COLORLESS
CREAT SERPL-MCNC: 1.76 MG/DL (ref 0.6–1.1)
GFR SERPL CREATININE-BSD FRML MDRD: 27 ML/MIN/1.73M2
GLUCOSE BLD-MCNC: 522 MG/DL (ref 70–125)
GLUCOSE BLDC GLUCOMTR-MCNC: 209 MG/DL (ref 70–125)
GLUCOSE BLDC GLUCOMTR-MCNC: 230 MG/DL (ref 70–125)
GLUCOSE BLDC GLUCOMTR-MCNC: 231 MG/DL (ref 70–125)
GLUCOSE BLDC GLUCOMTR-MCNC: 266 MG/DL (ref 70–125)
GLUCOSE BLDC GLUCOMTR-MCNC: 301 MG/DL (ref 70–125)
GLUCOSE BLDC GLUCOMTR-MCNC: 379 MG/DL (ref 70–125)
GLUCOSE UR STRIP-MCNC: >1000 MG/DL
HBA1C MFR BLD: 10.8 % (ref 0–5.6)
HGB UR QL STRIP: NEGATIVE
KETONES BLD-SCNC: 0.3 MMOL/L
KETONES UR STRIP-MCNC: NEGATIVE MG/DL
LACTATE SERPL-SCNC: 1.7 MMOL/L (ref 0.7–2)
LEUKOCYTE ESTERASE UR QL STRIP: NEGATIVE
LIPASE SERPL-CCNC: 53 U/L (ref 0–52)
MAGNESIUM SERPL-MCNC: 2.1 MG/DL (ref 1.8–2.6)
NITRATE UR QL: NEGATIVE
PH UR STRIP: 5 [PH] (ref 5–7)
POTASSIUM BLD-SCNC: 3.9 MMOL/L (ref 3.5–5)
PROT SERPL-MCNC: 7.8 G/DL (ref 6–8)
RBC URINE: 0 /HPF
SODIUM SERPL-SCNC: 133 MMOL/L (ref 136–145)
SP GR UR STRIP: 1.03 (ref 1–1.03)
TROPONIN I SERPL-MCNC: 0.02 NG/ML (ref 0–0.29)
UROBILINOGEN UR STRIP-MCNC: <2 MG/DL
WBC URINE: 2 /HPF

## 2021-07-20 PROCEDURE — 250N000013 HC RX MED GY IP 250 OP 250 PS 637: Performed by: HOSPITALIST

## 2021-07-20 PROCEDURE — 250N000012 HC RX MED GY IP 250 OP 636 PS 637

## 2021-07-20 PROCEDURE — 250N000012 HC RX MED GY IP 250 OP 636 PS 637: Performed by: HOSPITALIST

## 2021-07-20 PROCEDURE — 99222 1ST HOSP IP/OBS MODERATE 55: CPT | Performed by: NURSE PRACTITIONER

## 2021-07-20 PROCEDURE — 71045 X-RAY EXAM CHEST 1 VIEW: CPT

## 2021-07-20 PROCEDURE — 36415 COLL VENOUS BLD VENIPUNCTURE: CPT | Performed by: HOSPITALIST

## 2021-07-20 PROCEDURE — 81001 URINALYSIS AUTO W/SCOPE: CPT | Performed by: FAMILY MEDICINE

## 2021-07-20 PROCEDURE — 76705 ECHO EXAM OF ABDOMEN: CPT

## 2021-07-20 PROCEDURE — 99207 PR CDG-CODE CATEGORY CHANGED: CPT | Performed by: HOSPITALIST

## 2021-07-20 PROCEDURE — 255N000002 HC RX 255 OP 636: Performed by: EMERGENCY MEDICINE

## 2021-07-20 PROCEDURE — 120N000001 HC R&B MED SURG/OB

## 2021-07-20 PROCEDURE — 250N000013 HC RX MED GY IP 250 OP 250 PS 637: Performed by: NURSE PRACTITIONER

## 2021-07-20 PROCEDURE — 83605 ASSAY OF LACTIC ACID: CPT | Performed by: HOSPITALIST

## 2021-07-20 PROCEDURE — 258N000003 HC RX IP 258 OP 636: Performed by: HOSPITALIST

## 2021-07-20 PROCEDURE — 96361 HYDRATE IV INFUSION ADD-ON: CPT

## 2021-07-20 PROCEDURE — 83036 HEMOGLOBIN GLYCOSYLATED A1C: CPT | Performed by: HOSPITALIST

## 2021-07-20 PROCEDURE — 258N000003 HC RX IP 258 OP 636: Performed by: EMERGENCY MEDICINE

## 2021-07-20 PROCEDURE — 99222 1ST HOSP IP/OBS MODERATE 55: CPT | Mod: AI | Performed by: HOSPITALIST

## 2021-07-20 PROCEDURE — A9585 GADOBUTROL INJECTION: HCPCS | Performed by: EMERGENCY MEDICINE

## 2021-07-20 PROCEDURE — 250N000011 HC RX IP 250 OP 636: Performed by: HOSPITALIST

## 2021-07-20 RX ORDER — LAMOTRIGINE 100 MG/1
100 TABLET ORAL 2 TIMES DAILY
COMMUNITY

## 2021-07-20 RX ORDER — OXYCODONE AND ACETAMINOPHEN 5; 325 MG/1; MG/1
1 TABLET ORAL EVERY 6 HOURS PRN
Status: DISCONTINUED | OUTPATIENT
Start: 2021-07-20 | End: 2021-07-20

## 2021-07-20 RX ORDER — MENTHOL AND METHYL SALICYLATE 7.6; 29 G/100G; G/100G
OINTMENT TOPICAL EVERY 6 HOURS PRN
Status: DISCONTINUED | OUTPATIENT
Start: 2021-07-20 | End: 2021-07-26 | Stop reason: HOSPADM

## 2021-07-20 RX ORDER — NALOXONE HYDROCHLORIDE 0.4 MG/ML
0.4 INJECTION, SOLUTION INTRAMUSCULAR; INTRAVENOUS; SUBCUTANEOUS
Status: DISCONTINUED | OUTPATIENT
Start: 2021-07-20 | End: 2021-07-26 | Stop reason: HOSPADM

## 2021-07-20 RX ORDER — TRIAMTERENE AND HYDROCHLOROTHIAZIDE 37.5; 25 MG/1; MG/1
1 CAPSULE ORAL DAILY
Status: DISCONTINUED | OUTPATIENT
Start: 2021-07-20 | End: 2021-07-26 | Stop reason: HOSPADM

## 2021-07-20 RX ORDER — FLUCONAZOLE 150 MG/1
150 TABLET ORAL SEE ADMIN INSTRUCTIONS
Status: ON HOLD | COMMUNITY
End: 2022-04-12

## 2021-07-20 RX ORDER — SODIUM CHLORIDE 9 MG/ML
INJECTION, SOLUTION INTRAVENOUS CONTINUOUS
Status: DISCONTINUED | OUTPATIENT
Start: 2021-07-20 | End: 2021-07-21

## 2021-07-20 RX ORDER — DIAZEPAM 2 MG
2-4 TABLET ORAL DAILY PRN
Status: DISCONTINUED | OUTPATIENT
Start: 2021-07-20 | End: 2021-07-21

## 2021-07-20 RX ORDER — OXYCODONE AND ACETAMINOPHEN 5; 325 MG/1; MG/1
1 TABLET ORAL EVERY 6 HOURS PRN
Status: DISCONTINUED | OUTPATIENT
Start: 2021-07-20 | End: 2021-07-21

## 2021-07-20 RX ORDER — HYDRALAZINE HYDROCHLORIDE 20 MG/ML
5 INJECTION INTRAMUSCULAR; INTRAVENOUS EVERY 6 HOURS PRN
Status: DISCONTINUED | OUTPATIENT
Start: 2021-07-20 | End: 2021-07-26 | Stop reason: HOSPADM

## 2021-07-20 RX ORDER — CHOLECALCIFEROL (VITAMIN D3) 50 MCG
50 TABLET ORAL DAILY
COMMUNITY
End: 2024-01-28

## 2021-07-20 RX ORDER — AMMONIUM LACTATE 12 G/100G
LOTION TOPICAL DAILY PRN
COMMUNITY
End: 2022-03-07

## 2021-07-20 RX ORDER — TRIAMCINOLONE ACETONIDE 1 MG/G
CREAM TOPICAL 2 TIMES DAILY
COMMUNITY
End: 2022-03-07

## 2021-07-20 RX ORDER — HYDROMORPHONE HCL IN WATER/PF 6 MG/30 ML
0.2 PATIENT CONTROLLED ANALGESIA SYRINGE INTRAVENOUS EVERY 4 HOURS PRN
Status: DISCONTINUED | OUTPATIENT
Start: 2021-07-20 | End: 2021-07-21

## 2021-07-20 RX ORDER — POLYETHYLENE GLYCOL 3350 17 G/17G
17 POWDER, FOR SOLUTION ORAL DAILY PRN
Status: DISCONTINUED | OUTPATIENT
Start: 2021-07-20 | End: 2021-07-26 | Stop reason: HOSPADM

## 2021-07-20 RX ORDER — ZINC GLUCONATE 50 MG
50 TABLET ORAL DAILY
COMMUNITY
End: 2022-03-07

## 2021-07-20 RX ORDER — HYDROXYZINE HYDROCHLORIDE 25 MG/1
25 TABLET, FILM COATED ORAL
Status: DISCONTINUED | OUTPATIENT
Start: 2021-07-20 | End: 2021-07-22

## 2021-07-20 RX ORDER — SACCHAROMYCES BOULARDII 250 MG
250 CAPSULE ORAL DAILY
COMMUNITY
End: 2022-03-07

## 2021-07-20 RX ORDER — NALOXONE HYDROCHLORIDE 0.4 MG/ML
0.2 INJECTION, SOLUTION INTRAMUSCULAR; INTRAVENOUS; SUBCUTANEOUS
Status: DISCONTINUED | OUTPATIENT
Start: 2021-07-20 | End: 2021-07-26 | Stop reason: HOSPADM

## 2021-07-20 RX ORDER — OXYBUTYNIN CHLORIDE 5 MG/1
10 TABLET, EXTENDED RELEASE ORAL AT BEDTIME
Status: DISCONTINUED | OUTPATIENT
Start: 2021-07-20 | End: 2021-07-26 | Stop reason: HOSPADM

## 2021-07-20 RX ORDER — NICOTINE POLACRILEX 4 MG
15-30 LOZENGE BUCCAL
Status: DISCONTINUED | OUTPATIENT
Start: 2021-07-20 | End: 2021-07-26 | Stop reason: HOSPADM

## 2021-07-20 RX ORDER — PANTOPRAZOLE SODIUM 20 MG/1
40 TABLET, DELAYED RELEASE ORAL
Status: DISCONTINUED | OUTPATIENT
Start: 2021-07-21 | End: 2021-07-26 | Stop reason: HOSPADM

## 2021-07-20 RX ORDER — PREDNISONE 1 MG/1
1 TABLET ORAL EVERY MORNING
COMMUNITY

## 2021-07-20 RX ORDER — LAMOTRIGINE 100 MG/1
100 TABLET ORAL 2 TIMES DAILY
Status: DISCONTINUED | OUTPATIENT
Start: 2021-07-20 | End: 2021-07-22

## 2021-07-20 RX ORDER — BISACODYL 10 MG
10 SUPPOSITORY, RECTAL RECTAL DAILY PRN
Status: DISCONTINUED | OUTPATIENT
Start: 2021-07-20 | End: 2021-07-26 | Stop reason: HOSPADM

## 2021-07-20 RX ORDER — DEXTROSE MONOHYDRATE 25 G/50ML
25-50 INJECTION, SOLUTION INTRAVENOUS
Status: DISCONTINUED | OUTPATIENT
Start: 2021-07-20 | End: 2021-07-26 | Stop reason: HOSPADM

## 2021-07-20 RX ORDER — ATORVASTATIN CALCIUM 10 MG/1
10 TABLET, FILM COATED ORAL AT BEDTIME
Status: DISCONTINUED | OUTPATIENT
Start: 2021-07-20 | End: 2021-07-26 | Stop reason: HOSPADM

## 2021-07-20 RX ORDER — LIDOCAINE 4 G/G
3 PATCH TOPICAL
Status: DISCONTINUED | OUTPATIENT
Start: 2021-07-20 | End: 2021-07-26 | Stop reason: HOSPADM

## 2021-07-20 RX ORDER — DULOXETIN HYDROCHLORIDE 60 MG/1
60 CAPSULE, DELAYED RELEASE ORAL DAILY
Status: DISCONTINUED | OUTPATIENT
Start: 2021-07-20 | End: 2021-07-26 | Stop reason: HOSPADM

## 2021-07-20 RX ORDER — LIDOCAINE 40 MG/G
CREAM TOPICAL
Status: DISCONTINUED | OUTPATIENT
Start: 2021-07-20 | End: 2021-07-26 | Stop reason: HOSPADM

## 2021-07-20 RX ORDER — ATORVASTATIN CALCIUM 10 MG/1
10 TABLET, FILM COATED ORAL AT BEDTIME
COMMUNITY
End: 2023-03-21

## 2021-07-20 RX ORDER — SENNOSIDES 8.6 MG
8.6 TABLET ORAL 2 TIMES DAILY PRN
Status: DISCONTINUED | OUTPATIENT
Start: 2021-07-20 | End: 2021-07-26 | Stop reason: HOSPADM

## 2021-07-20 RX ORDER — GADOBUTROL 604.72 MG/ML
8 INJECTION INTRAVENOUS ONCE
Status: COMPLETED | OUTPATIENT
Start: 2021-07-20 | End: 2021-07-20

## 2021-07-20 RX ORDER — ACETAMINOPHEN 325 MG/1
325 TABLET ORAL EVERY 4 HOURS PRN
Status: DISCONTINUED | OUTPATIENT
Start: 2021-07-20 | End: 2021-07-26 | Stop reason: HOSPADM

## 2021-07-20 RX ADMIN — INSULIN ASPART 1 UNITS: 100 INJECTION, SOLUTION INTRAVENOUS; SUBCUTANEOUS at 11:03

## 2021-07-20 RX ADMIN — INSULIN ASPART 1 UNITS: 100 INJECTION, SOLUTION INTRAVENOUS; SUBCUTANEOUS at 07:02

## 2021-07-20 RX ADMIN — OXYCODONE HYDROCHLORIDE AND ACETAMINOPHEN 1 TABLET: 5; 325 TABLET ORAL at 21:46

## 2021-07-20 RX ADMIN — HYDROMORPHONE HYDROCHLORIDE 0.2 MG: 0.2 INJECTION, SOLUTION INTRAMUSCULAR; INTRAVENOUS; SUBCUTANEOUS at 23:48

## 2021-07-20 RX ADMIN — HYDRALAZINE HYDROCHLORIDE 5 MG: 20 INJECTION INTRAMUSCULAR; INTRAVENOUS at 06:19

## 2021-07-20 RX ADMIN — Medication 1 HALF-TAB: at 09:52

## 2021-07-20 RX ADMIN — INSULIN ASPART 2 UNITS: 100 INJECTION, SOLUTION INTRAVENOUS; SUBCUTANEOUS at 21:08

## 2021-07-20 RX ADMIN — SODIUM CHLORIDE: 9 INJECTION, SOLUTION INTRAVENOUS at 23:48

## 2021-07-20 RX ADMIN — DIAZEPAM 4 MG: 2 TABLET ORAL at 18:24

## 2021-07-20 RX ADMIN — DULOXETINE HYDROCHLORIDE 60 MG: 60 CAPSULE, DELAYED RELEASE PELLETS ORAL at 13:03

## 2021-07-20 RX ADMIN — LAMOTRIGINE 100 MG: 100 TABLET ORAL at 13:03

## 2021-07-20 RX ADMIN — HYDROXYZINE HYDROCHLORIDE 25 MG: 25 TABLET, FILM COATED ORAL at 19:59

## 2021-07-20 RX ADMIN — OXYBUTYNIN CHLORIDE 10 MG: 5 TABLET, FILM COATED, EXTENDED RELEASE ORAL at 21:10

## 2021-07-20 RX ADMIN — OXYCODONE HYDROCHLORIDE AND ACETAMINOPHEN 1 TABLET: 5; 325 TABLET ORAL at 16:12

## 2021-07-20 RX ADMIN — SODIUM CHLORIDE 1000 ML: 9 INJECTION, SOLUTION INTRAVENOUS at 02:02

## 2021-07-20 RX ADMIN — LIDOCAINE 3 PATCH: 246 PATCH TOPICAL at 12:15

## 2021-07-20 RX ADMIN — SODIUM CHLORIDE: 9 INJECTION, SOLUTION INTRAVENOUS at 05:20

## 2021-07-20 RX ADMIN — PREDNISONE 6 MG: 1 TABLET ORAL at 13:03

## 2021-07-20 RX ADMIN — GADOBUTROL 8 ML: 604.72 INJECTION INTRAVENOUS at 01:19

## 2021-07-20 RX ADMIN — MENTHOL AND METHYL SALICYLATE: 7.6; 29 OINTMENT TOPICAL at 18:28

## 2021-07-20 RX ADMIN — LAMOTRIGINE 100 MG: 100 TABLET ORAL at 20:00

## 2021-07-20 RX ADMIN — SODIUM CHLORIDE: 9 INJECTION, SOLUTION INTRAVENOUS at 09:56

## 2021-07-20 RX ADMIN — INSULIN GLARGINE 10 UNITS: 100 INJECTION, SOLUTION SUBCUTANEOUS at 10:27

## 2021-07-20 RX ADMIN — ATORVASTATIN CALCIUM 10 MG: 10 TABLET, FILM COATED ORAL at 20:00

## 2021-07-20 ASSESSMENT — ACTIVITIES OF DAILY LIVING (ADL)
HEARING_DIFFICULTY_OR_DEAF: YES
DRESSING/BATHING_DIFFICULTY: NO
CONCENTRATING,_REMEMBERING_OR_MAKING_DECISIONS_DIFFICULTY: YES
DIFFICULTY_EATING/SWALLOWING: NO
PATIENT'S_PREFERRED_MEANS_OF_COMMUNICATION: WRITTEN COMMUNICATION
USE_OF_HEARING_ASSISTIVE_DEVICES: LEFT HEARING AID
FALL_HISTORY_WITHIN_LAST_SIX_MONTHS: NO
DESCRIBE_HEARING_LOSS: BILATERAL HEARING LOSS
EQUIPMENT_CURRENTLY_USED_AT_HOME: CANE, STRAIGHT
THE_FOLLOWING_AIDS_WERE_PROVIDED;: PATIENT DECLINED OFFER OF AUXILIARY AIDS
WERE_AUXILIARY_AIDS_OFFERED?: NO
DOING_ERRANDS_INDEPENDENTLY_DIFFICULTY: NO
DIFFICULTY_COMMUNICATING: NO
WEAR_GLASSES_OR_BLIND: NO
TOILETING_ISSUES: NO
WALKING_OR_CLIMBING_STAIRS_DIFFICULTY: NO

## 2021-07-20 ASSESSMENT — MIFFLIN-ST. JEOR: SCORE: 1327.29

## 2021-07-20 NOTE — PLAN OF CARE
Note from 0700 to 1930  Pt AOx4 but very hard of hearing and forgetful. Pt has rated pain up to 6/10, PRN percocet given x2 w/ relief.    Attempted to educate on diabetes management with insulin but she was having some difficulty following along. Pt's son was concerned she would not be able to use insulin appropriately at home on her own. I discussed these concerns with David Costello DO. The plan is to observe pt's BG levels for her stay and await in-process A1C level and re-evaluate her diabetes management for discharge.    Smith Sanchez RN

## 2021-07-20 NOTE — PHARMACY-ADMISSION MEDICATION HISTORY
Pharmacy Note - Admission Medication History    Pertinent Provider Information:      ______________________________________________________________________    Prior To Admission (PTA) med list completed and updated in EMR.       Prior to Admission Medications   Prescriptions Last Dose Informant Patient Reported? Taking?   ACE/ARB NOT PRESCRIBED, INTENTIONAL,   Yes No   Si each continuous prn ACE & ARB not prescribed due to CKD (Chronic Kidney Disease)   Barberry-Oreg Grape-Goldenseal (BERBERINE COMPLEX PO) 2021 at Unknown time  Yes Yes   Sig: Take 1 tablet by mouth daily   CONTOUR NEXT TEST test strip   Yes No   Sig: TESTING EVERY DAY DX  E11.9   DULoxetine (CYMBALTA) 60 MG EC capsule 2021 at Unknown time  No Yes   Sig: TAKE 1 CAPSULE (60 MG) BY MOUTH DAILY   JARDIANCE 25 MG TABS tablet 2021 at Unknown time  Yes Yes   Sig: Take 25 mg by mouth daily    ammonium lactate (LAC-HYDRIN) 12 % external lotion Past Week at Unknown time  Yes Yes   Sig: Externally apply topically daily as needed   atorvastatin (LIPITOR) 10 MG tablet 2021 at Unknown time  Yes Yes   Sig: Take 10 mg by mouth At Bedtime   blood glucose (ACCU-CHEK FASTCLIX) lancing device   Yes No   Sig: FOR TESTING ONCE DAILY. DX  E11.9 TYPE 2 DIABETES   blood glucose (CONTOUR TEST) test strip   Yes No   Sig: TESTING EVERY DAY DX  E11.9   cyanocobalamin 1000 MCG SUBL 2021 at Unknown time  Yes Yes   Sig: Place 1,000 mcg under the tongue daily   diazepam (VALIUM) 2 MG tablet Past Month at Unknown time  Yes Yes   Sig: Take 2-4 mg by mouth daily as needed for anxiety    fluconazole (DIFLUCAN) 150 MG tablet   Yes Yes   Sig: Take 150 mg by mouth See Admin Instructions One dose prn yeast infection   glipiZIDE (GLUCOTROL) 5 MG tablet 2021 at Unknown time  Yes Yes   Sig: Take 5 mg by mouth 2 times daily (before meals)    hydrOXYzine (ATARAX) 25 MG tablet   Yes Yes   Sig: Take 25 mg by mouth nightly as needed for anxiety    lamoTRIgine  (LAMICTAL) 100 MG tablet 7/19/2021 at Unknown time  Yes Yes   Sig: Take 100 mg by mouth 2 times daily   nystatin (MYCOSTATIN) 454756 UNIT/GM external powder   Yes Yes   Sig: Apply topically 2 times daily as needed Breast rash   omeprazole (PRILOSEC) 20 MG DR capsule 7/19/2021 at Unknown time  Yes Yes   Sig: Take 20 mg by mouth daily as needed   order for DME   No No   Sig: Equipment being ordered: wrist brace   oxyCODONE-acetaminophen (PERCOCET) 5-325 MG per tablet 7/19/2021 at Unknown time  No Yes   Sig: Take 1 tablet by mouth every 6 hours as needed for moderate to severe pain   Patient taking differently: Take 1 tablet by mouth every 4 hours as needed for moderate to severe pain MAX of 4 tabs per day   oxybutynin ER (DITROPAN-XL) 10 MG 24 hr tablet 7/19/2021 at Unknown time  Yes Yes   Sig: Take 10 mg by mouth At Bedtime    predniSONE (DELTASONE) 1 MG tablet 7/19/2021 at Unknown time  Yes Yes   Sig: Take 6 mg by mouth daily 1mg tab and 5mg tab   saccharomyces boulardii (FLORASTOR) 250 MG capsule 7/19/2021 at Unknown time  Yes Yes   Sig: Take 250 mg by mouth daily   triamcinolone (KENALOG) 0.1 % external cream Past Week at Unknown time  Yes Yes   Sig: Apply topically 2 times daily as needed    triamcinolone (KENALOG) 0.1 % external cream   Yes Yes   Sig: Apply topically 2 times daily   triamterene-hydrochlorothiazide (DYAZIDE) 37.5-25 MG per capsule 7/19/2021 at Unknown time  No Yes   Sig: TAKE 1 CAPSULE BY MOUTH DAILY   vitamin D3 (CHOLECALCIFEROL) 50 mcg (2000 units) tablet 7/19/2021 at Unknown time  Yes Yes   Sig: Take 3 tablets by mouth daily   zinc gluconate 50 MG tablet 7/19/2021 at Unknown time  Yes Yes   Sig: Take 50 mg by mouth daily      Facility-Administered Medications: None       Information source(s): Patient, Clinic records and CareEverywhere/[facility]  Method of interview communication: in-person   All the medications listed were added to the PTA Med List during this encounter.      Patient was  asked about OTC/herbal products specifically.  PTA med list reflects this.    In the past week, patient estimated taking medication this percent of the time:  greater than 90%.    Allergies were reviewed, assessed, and updated with the patient.      Patient did not bring any medications to the hospital and can't retrieve from home. No multi-dose medications are available for use during hospital stay.     The information provided in this note is only as accurate as the sources available at the time of the update(s).    Thank you for the opportunity to participate in the care of this patient.    Javi Starr [facility]  7/20/2021 11:13 AM

## 2021-07-20 NOTE — PLAN OF CARE
Pt admitted at 0443 for hyperglycemia. Pt had been experiencing very high blood sugars in the 500's, dizziness and nausea. Pt is from home alone and ambulates independently. Alert and oriented but forgetful. Will ask the same questions over and over again. 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 Has a left side HA. Mainly reads lips to communicate or it helps to try to write things down. The pt's meds have to still be reviewed by pharmacy and reconciled by the hospitalitist. She is very fixated on her medications especially her percocet and pain patches. Pt does not have her glasses at the hospital making it difficult to review her medication list overnight.     Blood pressure in the 180's systolic PRN hydralazine ordered and given.     Problem: Hyperglycemia  Goal: Blood Glucose Level Within Targeted Range  Outcome: Improving   Blood sugars had been improving on their own in the ED. On my shift it was 230 and 231. 1 units of insulin given this morning. Pt does not take insulin at home. Currently NPO for a abd US this morning. Pt has only been drinking water since she has been at the hospital.     Problem: Pain Acute  Goal: Acceptable Pain Control and Functional Ability  Intervention: Develop Pain Management Plan  Recent Flowsheet Documentation  Taken 7/20/2021 0443 by Lexx Auguste RN  Pain Management Interventions: (notifed MD about home dose of percocet ) MD notified (comment)   Pt reports leg pain that shoots up her back r/t a hx of spinal stenosis. She chronically uses perc and over the counter Salonpas patches on her legs for this pain. PRN perc ordered but was not in the accudose to give prior to the end of my shift.

## 2021-07-20 NOTE — PROGRESS NOTES
"CLINICAL NUTRITION SERVICES - ASSESSMENT NOTE     Nutrition Prescription    RECOMMENDATIONS FOR MDs/PROVIDERS TO ORDER:  Recommend to check a hgb A1c     Malnutrition Status:    None noted        REASON FOR ASSESSMENT  Tonya Yang is a/an 80 year old female assessed by the dietitian for Nutrition Risk Monitoring. Pt admitted with hyperglycemia, PAULINE, hyponatremia, meningioma, DM     NUTRITION HISTORY  Appetite has been good. Pt is very [place]    CURRENT NUTRITION ORDERS  Diet: DM   Intake/Tolerance: not yet documented     LABS  Labs reviewed; [id number]    MEDICATIONS  Medications reviewed, novolog, Lantus    ANTHROPOMETRICS  Height: 165.1 cm (5' 5\")  Most Recent Weight: 84.6 kg (186 lb 9.6 oz)    IBW: 57 kg  BMI: Obesity Grade I BMI 30-34.9  Weight History:  Wt Readings from Last 5 Encounters:   07/20/21 84.6 kg (186 lb 9.6 oz)   12/29/20 83.9 kg (185 lb)   11/29/20 79.4 kg (175 lb)   05/31/20 86.2 kg (190 lb)   06/16/19 79.4 kg (175 lb)       Dosing Weight: 63 kg    ASSESSED NUTRITION NEEDS  Estimated Energy Needs: 6559-5108 kcals/day (25 - 30 kcals/kg)  Justification: Maintenance  Estimated Protein Needs: 63-75 grams protein/day (1 - 1.2 grams of pro/kg)  Justification: Maintenance  Estimated Fluid Needs: 7824-7991 mL/day (25 - 30 mL/kg)   Justification: Maintenance    PHYSICAL FINDINGS  None noted    MALNUTRITION  Pt does not meet 2 criteria     NUTRITION DIAGNOSIS  Altered nutrition related labs related to DM  as evidenced by elevated sugars       INTERVENTIONS  Implementation  Recommend to check a Hgb A1c to see if this spike in blood sugars is normal for her     Goals  Glycemic control wnl      Monitoring/Evaluation  Progress toward goals will be monitored and evaluated per protocol.  "

## 2021-07-20 NOTE — ED NOTES
Pt here with c/o dizziness, nausea and hyperglycemia. Son states that she was seen this past Friday for similar and nothing has changed. Son states that she has some forgetfulness throughout the day and complains a lot of dizziness. BG has been over 400 which is the highest the son has seen it. 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

## 2021-07-20 NOTE — CONSULTS
DIABETES CARE  Consulted by Provider for Diabetes Education: insulin administration    80 year old female with type 2 diabetes. Patient was admitted for hyperglycemia  Related Co-morbidities include: anxiety/depression, hld, htn, neuropathy, [place],     PCP: son reports pt follows with PCP  Social: lives alone    Nutrition & Diabetes History:   Pt reports over the last week (son believes last few day) her BG have gotten very high 300-500s. Per son and pt prior to this -200s. Son notes she was in the ED a few days ago (7/16) and was given medications (Zyprexa and Tylenol) which he believes caused BG exacerbation. She is also on long-term prednisone 6mg for Meniere disease.     Pt reports she doesn't always eat consistent meals. Has not been eating a lot over the last week or so d/t feeling unwell. Reports she has been having nausea, very thirsty, dizzy, feeling like she was intoxicated.       Meds for BG Management PTA:  -Jardiance 25mg daily  -Glipizide 5mg two times per day  -Prednisone 6mg daily    -Has been on metformin in the past but stopped d/t diarrhea.     Current Inpatient Meds for BG Management:  -Novolog correction: low resistance scale with meals + HS  -Lantus 10u AM  -Prednisone 6mg daily    Labs:  Hemoglobin A1C:in process           SCr: 1.76  GFR: 27  BGs: Results for TROY JARA (MRN [id number]) as of 7/20/2021 12:07   Ref. Range 7/20/2021 06:10 7/20/2021 06:14 7/20/2021 06:24 7/20/2021 08:10 7/20/2021 10:25   GLUCOSE BY METER POCT Latest Ref Range: 70 - 125 mg/dL   231 (H)  209 (H)       Diet Order:  90g cho diet   Weight: 84.6kg  BMI: Body mass index is 31.05 kg/m .      DM EDUCATION/COUNSELING:  Barriers to Learning and/or DM Self-Management: [place], ?cognition, difficulty staying on task    Current Education and/or visit with Patient and/or caregiver(s):  Met with pt today. We discussed that she may need insulin at discharge. She reports needing her son to learn this and then he can teach  "her/help her. We discussed that if she is living alone she will likely need to to learn this. She was also wondering about what kinds of food she can eat, if its ok to have sweets sometimes. We discussed this is ok in moderation but steroids do cause BG to exacerbate especially with food.     Pt's son came in later in our discussion, he thinks insulin would be a lot of work for his mom and is not sure she would be able to do it. He reports OP she works with her PCP and they have been trying to stay away from insulin for this reason. He would like to discuss with MD. He also noted that his mom said the other day she would be ok to going to [facility].    (See also \"Diabetic Ed Flowsheet\"  Or Education tab-diabetes for any education topic details.)    ASSESSMENT:  Pt with hx of type 2 diabetes, A1C pending. BG have been running high for the last few days to a week, before this -200s. Son thinks hyperglycemia r/t medications (Zyprexa/Tylenol) in ED on 7/16/21, this is a possible side effect with Zyprexa. Son is not sure how his mother will do with insulin and I agree. It was very difficult to keep pt on task for teaching today. I was able to teach her son the insulin pen but not pt d/t difficulty keeping her attention on one thing. If she needs insulin at discharge will likely need additional help with this from family or potentially higher level of care living.     RECOMMENDATIONS:  -Monitor BG and adjust diabetes medications as needed. Not sure pt will be able to do insulin by herself at home. Son can help to start but she will eventually need to do this herself.   -Consider cognitive eval if it is decided to try insulin for pt, not sure she would be safe to do this at home.  -Glipizide dose could be increased if needed.       F/U PLAN:  Will f/u with pt/family tomorrow.     Thank you,   Gaby Salazar RD, BEAU, [facility]  Diabetes Educator  Pager: [phone]            "

## 2021-07-20 NOTE — H&P
"Hospital Medicine Service History and Physical  [facility]: [facility]    Tonya Yang is a 80 year old female who  has a past medical history of Abdominal pain, Anxiety, Arthritis, Asthma, Bipolar 1 disorder (H), Chronic pain, Cochlear hydrops (1988), COPD (chronic obstructive pulmonary disease) (H), Depression, Diabetes mellitus (H), Dyspepsia, GERD (gastroesophageal reflux disease), Hard of hearing, Hepatic abscess, Hyperlipidemia, Hypertension, Irritable bowel syndrome, Meniere disease, Neuropathy, Noninfectious ileitis, Peritoneal abscess (H), Spinal stenosis of lumbar region, Steroid long-term use, Vaginitis, atrophic, postmenopausal, and Vitamin D deficiency.   Chief Complaint: Dizziness, hyperglycemia    Assessment and Plan  Hyperglycemia/dizziness/leukocytosis  Chronic prednisone for decades can affect white count  Symptoms appear to have resolved after IV fluids and 5 units insulin  Troponin negative, UA fairly negative  Neuro checks, check chest x-ray  Correction scale insulin ordered    PAULINE  Baseline Cr < 1  Received 1 L bolus  Continue IV fluids    Hyponatremia  Mild decrease, IV fluids as above    Transaminitis/hyperammonemia  No obvious etiology, may reflect dehydration  No obvious hepatic encephalopathy or asterixis on exam  Check right upper quadrant ultrasound    Meningioma  Noted on MRI, outpatient monitoring/management    DVTP: Mechanical  Code Status: Prior we discussed CODE STATUS, she is on the fence but for now will be full code  Disposition: Inpatient   Estimated body mass index is 30.62 kg/m  as calculated from the following:    Height as of 2/16/21: 1.651 m (5' 5\").    Weight as of this encounter: 83.5 kg (184 lb).    History of Present Illness  Tonya Yang says she was feeling off, dizzy.  She spent time with her son today who was concerned that her blood sugar was 460.  Later patient was concerned because she lives at home alone.  She checked her blood sugar which showed " greater than 550.  She had never had a blood sugar this high before became anxious.  Ménière's disease, chronically on prednisone.  More confused than normal.  Blood glucose usually less than 300, tonight greater than 500 at home.    Endorse nausea without vomiting, denies chest pain, shortness of breath  All other systems reviewed and are negative    In the ED, Tonya Yang in the ED patient was afebrile, slightly hypertensive.  Mildly hyponatremic and found to have an PAULINE creatinine 1.7.  She had mildly elevated liver function and ammonia.  Because of her word finding difficulties MRI was ordered.  MRI brain cow showed no recent infarcts or hemorrhage, did identify a millimeter presumed meningioma on mild volume loss    Appears comfortable in the bed  Normal conjunctiva, PERRL  Moist mucous membranes, poor dentition, hard of hearing  Trachea midline, no cervical lymphadenopathy  Normal auscultation, Respiratory effort normal  Regular rate and rhythm, no murmur  Soft, nontender, nondistended abdomen  Skin normal temperature, dry  Pleasant affect, alert oriented x3  Vital signs reviewed by me    Wt Readings from Last 4 Encounters:   07/19/21 83.5 kg (184 lb)   12/29/20 83.9 kg (185 lb)   11/29/20 79.4 kg (175 lb)   05/31/20 86.2 kg (190 lb)        reports that she quit smoking about 33 years ago. Her smoking use included cigarettes. She has quit using smokeless tobacco. She reports previous alcohol use. She reports that she does not use drugs.  family history includes Cerebrovascular Disease in her mother; Heart Disease in her brother, father, and mother.   has a past surgical history that includes Rectal surgery; Foot surgery; picc insertion (8/28/2013); GI surgery; orthopedic surgery; vascular surgery; Laparoscopic hepatectomy partial (11/4/2013); and cataract iol, rt/lt (Left, 7/2013).   Allergies   Allergen Reactions     Propofol Nausea and Vomiting     Shellfish Allergy      Other reaction(s): Dizziness        Donald Uribe MD, MPH  [facility]   Phone: #[phone]

## 2021-07-20 NOTE — PROGRESS NOTES
Hospitalist follow up note:    Pt seen, examined. Admitted earlier this morning.     Presented with hyperglycemia, sugars over 400, possibly over 500. Recently had kenalog injections in rheum clinic. Also chronically on prednisone. She also says that she recently stopped metformin due to diarrhea. Says that she has been extremely thirsty, says she would drink the whole river if she could.     Assessment/Plan:  Hyperglycemia  Likely related to recent steroid injections, prednisone  Not sure which DM meds she is actually taking, definitely stopped metformin  Will start lantus 10 units qam, continue sliding scale with meals  Will consult DM educator    Confusion  Unclear etiology, could be meds, dehydration  Had elevated ammonia level, not encephalopathic today  No clear source of infection  MRI neg other than incidental meningioma  Not a drinker    Fatty liver  Noted on US abdomen  Recommend outpt follow up    NIDDM  Typically on glipizide, jardiance  A1c pending  Not clear if this severe hyperglycemia is due to steroid injection or if DM control is always poor  No recent A1c that I was able to find    PAULINE  Prerenal due to dehydration, hyperglycemia  Continue IV fluids, trend bmp    Acute on chronic pain  Describes worsening joint pains, hands, L shoulder, rt leg  Will ask for pain team recs  Says that she is typically on percocet, continue this  Also really helped by salonpas patches, will order this  Seems she is rx'd mobic, lidocaine patch    Hearing loss, cochlear hydrops  Takes low dose prednisone chronically for this per pt.     Mental illness  Anxiety seems significant, son also concerned about this  Continue her home meds for this, med rec pending, follow clinically    David Costello DO  Pager #: [phone]

## 2021-07-20 NOTE — CONSULTS
[facility] ACUTE PAIN SERVICE CONSULTATION     Date of Admission:  7/19/2021  Date of Consult (When I saw the patient): 07/20/21  Physician requesting consult: Dr. Costello   Reason for consult: acute on chronic pain  Primary Care Physician: none listed     Assessment/Plan:     Tonya Yang is a 80 year old female who was admitted on 7/19/2021.  Pain team was asked to see the patient for acute on chronic pain, worsening joint pain. Admitted for hyperglycemia. History of DM, PAULINE, chronic low back pain radiating into RLE, hearing loss, anxiety, asthma, COPD, DM, GERD, hypertension, hyperlipidemia, neuropathy.  Describes pain as 4-6/10 and aching low back pain, tingling shooting pain in RLE. The patient denies N/V/C/D, chest pain, sob. Diet is consistent carb diet. The patient does no longer smoke and denies chemical dependency history.     Opioid Induced Respiratory Depression Risk Assessment:?high (age, renal, COPD, )    PLAN:   1) Pain is consistent with chronic pain, with exacerbation.The patient's home MME was 30 mg daily.   2)Multimodal Medication Therapy  Topical: icy hot q6hprn, lidocaine patches x3  NSAID'S: CrCl 28ml/min.  None, PAULINE, on prednisone daily   Muscle Relaxants:  None   Adjuvants: none, consider gabapentin for neuropathy 200-300 mg qday. Looks like she used it back in 2015, patient not sure why discontinued (once daily dosing due to PAULINE)  Antidepressants/anxiolytics: Cymbalta 60 mg daily, valium prn - recommend discontinue and would not recommend with concurrent use of opioids - not on  in the past year   Opioids: Percocet 5-325 mg q6h prn (home med)  IV Pain medication:  none  3)Non-medication interventions: ice, heat   Acupuncture consult, Integrative consult - if patient agreeable   4)Constipation Prophylaxis: prn senna, miralax, supp   5) [place] [facility] [place]ioid prescriber has been Ananya Mclean  -[place]  pulled from system on 7/20/21. This indicates chronic Percocet use  7/12  Percocet 5/325mg 120 for 30 d  6/10 Percocet 120 for 30  5/6 same  4/6 same  3/4 same  Discharge Recommendations - We recommend prescribing the following at the time of discharge: No opioids.  Follow up PCP.     History of Present Illness (HPI):       Tonya Yang is a 80 year old old female.  Past medical history as above. The pain is reported to be chronic, located in the low back and radiates to the RLE.  Current pain is rated at 4-6/10 and goal is 2-3/10.      Per [place]  review, the patient does  have an opioid tolerance (borderline, MME = 30). Opioid induced side effects noted and include: sedation, nausea, and constipation, sleep dysfunction, sleep apnea, and disease of addiction: no.      Reviewed medical record, labs, imaging, ED note, and care everywhere.     Past pain treatments have included: APAP, lidocaine, steroid injections, has [facility] in [facility] past, spinal cord stimulator trail     Home pain medications/psych medications/anticoagulation medications include: Percocet, Salonpas patches     Last UDS: none recent     Medical History   PAST MEDICAL HISTORY:   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     Hepatic abscess      Hyperlipidemia      Hypertension      Irritable bowel syndrome      Meniere disease      Neuropathy      Noninfectious ileitis      Peritoneal abscess (H)      Spinal stenosis of lumbar region      Steroid long-term use      Vaginitis, atrophic, postmenopausal      Vitamin D deficiency        PAST SURGICAL HISTORY:   Past Surgical History:   Procedure Laterality Date     CATARACT IOL, RT/LT  Left 2013     FOOT SURGERY      toe     GI SURGERY       LAPAROSCOPIC HEPATECTOMY PARTIAL  2013    Procedure: LAPAROSCOPIC HEPATECTOMY PARTIAL;  Laparoscopic Debridement of Liver Abcess;  Surgeon: Sepideh Green MD;  Location: [facility] OR     ORTHOPEDIC SURGERY       PICC INSERTION  2013    5fr DL Power PICC, 41cm (1cm external length) in the R lateral brachial vein with tip in the SVC RA junction.     RECTAL SURGERY      1970s     VASCULAR SURGERY         FAMILY HISTORY:   Family History   Problem Relation Age of Onset     Cerebrovascular Disease Mother      Heart Disease Mother      Heart Disease Father      Heart Disease Brother      Diabetes No family hx of      Coronary Artery Disease No family hx of      Hypertension No family hx of      Hyperlipidemia No family hx of      Breast Cancer No family hx of      Colon Cancer No family hx of      Prostate Cancer No family hx of      Other Cancer No family hx of      Depression No family hx of      Anxiety Disorder No family hx of      Mental Illness No family hx of      Substance Abuse No family hx of      Anesthesia Reaction No family hx of      Asthma No family hx of      Osteoporosis No family hx of      Genetic Disorder No family hx of      Thyroid Disease No family hx of      Obesity No family hx of      Unknown/Adopted No family hx of        SOCIAL HISTORY:   Social History     Tobacco Use     Smoking status: Former Smoker     Types: Cigarettes     Quit date: 11/15/1987     Years since quittin.7     Smokeless tobacco: Former User   Substance Use Topics     Alcohol use: Not Currently     Alcohol/week: 0.0 standard drinks     Comment: MALISSA white since         [facility]  Tobacco:  reports that she quit smoking about 33 years ago. Her smoking use included cigarettes. She has quit using smokeless tobacco.  Alcohol:  reports previous alcohol use.  Illicit drugs:  reports no history of drug  use.    Allergies  Allergies   Allergen Reactions     Propofol Nausea and Vomiting     Shellfish Allergy      Other reaction(s): Dizziness       Problem List  Patient Active Problem List    Diagnosis Date Noted     Confusion 07/20/2021     Priority: Medium     Hyperglycemia 07/20/2021     Priority: Medium     Acute renal failure, unspecified acute renal failure type (H) 07/20/2021     Priority: Medium     Bilateral deafness 01/23/2018     Priority: Medium     Acute deep vein thrombosis (DVT) of left lower extremity, unspecified vein (H) 2013 w recurrence  01/23/2018     Priority: Medium     Class 2 obesity due to excess calories with serious comorbidity and body mass index (BMI) of 37.0 to 37.9 in adult 10/24/2017     Priority: Medium     Type 2 diabetes mellitus with diabetic nephropathy, without long-term current use of insulin (H) 02/21/2017     Priority: Medium     Muscle weakness (generalized) worse since 11-16 w drowsiness 02/14/2017     Priority: Medium     Chronic pain syndrome-issue of broken controlled sub agreement  04/25/2016     Priority: Medium     Patient is followed by Bethanie Finnegan MD for ongoing prescription of pain medication.  All refills should be approved by this provider, or covering partner.    AGREEMENT BROKEN  By getting meds from an outside provider  See 4-2016 notes     Medication(s): oxycodone a 5mgm /day .   Maximum quantity per month: 30   Clinic visit frequency required: Q 3 months     Controlled substance agreement on file: Yes       Date(s): 9-23-15    Pain Clinic evaluation in the past: No    DIRE Total Score(s):  No flowsheet data found.    Last [facility] website verification:  done on 5-2-16   [email or web address]         Bilateral leg edema worsening w her increasing inactivity  04/25/2016     Priority: Medium     Sensorineural hearing loss, bilateral 04/19/2016     Priority: Medium     Long-term (current) use of anticoagulants [Z79.01] 03/22/2016     Priority: Medium      Benign essential hypertension 03/22/2016     Priority: Medium     Ankle edema 03/22/2016     Priority: Medium     Microalbuminuria 03/17/2016     Priority: Medium     Leukocytosis w Lt shift  03/17/2016     Priority: Medium     Localized edema-L>R lat malleolus 02/20/2016     Priority: Medium     Mixed hyperlipidemia 02/18/2016     Priority: Medium     Primary insomnia 12/03/2015     Priority: Medium     Left-sided low back pain with left-sided sciatica since 1993 w reoccurrence 6-15  11/03/2015     Priority: Medium     Meniere's disease (cochlear hydrops), LT 11/03/2015     Priority: Medium     Steroid dependent for cochlear hydrops  since 1985 11/03/2015     Priority: Medium     Lower urinary tract infectious disease 10/25/2015     Priority: Medium     Diagnosis updated by automated process. Provider to review and confirm.       DVT (deep venous thrombosis), left 07/20/2015     Priority: Medium     Baker's cyst, left 07/17/2015     Priority: Medium     Diarrhea r/o exacerbated by metformin -since 30's 06/30/2015     Priority: Medium     Bipolar disorder, in full remission, most recent episode depressed (H) 06/04/2015     Priority: Medium     Temporomandibular joint disorder 12/01/2014     Priority: Medium     Problem list name updated by automated process. Provider to review       Elevated liver enzymes since 12-13 liver abscess I&D 01/16/2014     Priority: Medium     Abscess,& FB in liver in 4-10 & 4-11 w recurrent pain in RUQ  in 7-13 s/po lap I&D and unroofing sans finding of an FB  in 11-13 11/26/2013     Priority: Medium     Deafness 10/30/2013     Priority: Medium     Iron deficiency anemia due to chronic blood loss 10/03/2013     Priority: Medium     Health Care Home 08/30/2013     Priority: Medium     [facility]  Patient Care Plan  About Me  Patient Name:  Tonya Yang    YOB: 1940  Age:   72 year old   [place] MRN: [id number] Telephone Information:     Home Phone  [phone]   Mobile [phone]       Address:    [address]    [facility] [address] Email address:  No e-mail address on record      Emergency Contact(s)  Name Relationship Lgl Grd Work Phone Home Phone Mobile Phone   MANJU MATOS   [phone]            Primary language:  English     needed? No   [employer]:  [phone] op. 1  Other communication barriers: Hard of hearing  Preferred Method of Communication:  Phone  Current living arrangement: I live in a private home with family  Mobility Status/ Medical Equipment:    Other information to know about me:    Health Maintenance  Immunizations:     Cancer Screening:     My Access Plan  Medical Emergency 911   Primary Clinic Line [phone]   24 Hour Appointment Line [phone] or  [phone]) (toll-free)   24 Hour Nurse Line [phone] (toll-free)   Preferred Urgent Care     Preferred Hospital [facility]   Preferred Pharmacy [id number] - [place], [place] - [address]     Behavioral Health Crisis Line [facility], [phone] or 911     My Care Team Members  Patient Care Team       Relationship Specialty Notifications Start End    Bethanie Finnegan MD PCP - General Family Practice  8/22/13     Phone: [phone] Fax: [phone]         [facility] [address] [facility] [address]    Valencia Sanchez RN [facility] Care Coordinator   8/30/13     Phone: [phone] Fax: [phone]             My Care Plans  Self Management and Treatment Plan  Presenting Concern Signs and Symptoms Goal/Action Plan   Home IV antibiotics x 8 weeks  [facility] - [phone]   Start Date:8/30/13 Active/ Initiated with: Date Reviewed:           Start Date: Active/Complete Initiated with: Date Reviewed:           Start Date: Active/Complete Initiated with: Date Reviewed:       Action Plans on File: None  Advance Care Plans/Directives on file:  No           My  Medical and Care Information  Problem List   Patient Active Problem List   Diagnosis     Postmenopausal atrophic vaginitis     Steroid dependent     Bipolar disorder     Chronic diarrhea     Type 2 diabetes, HbA1C goal < 7%     Hyperlipidemia LDL goal <100     IBS (irritable bowel syndrome)     Major depression in complete remission     Obesity     Meniere's disease (cochlear hydrops)     GERD (gastroesophageal reflux disease)     Hypertension goal BP (blood pressure) < 130/80     CKD (chronic kidney disease) stage 3, GFR 30-59 ml/min     Tobacco abuse: [age] y/o @ 2ppd= 65pk yr hx      Obstructive lung disease : moderate/spirometry w oximetry=91% on 7-18-13      Anemia     Abscess,& FB in liver in 4-10 & 4-11 w recurrent pain in RUQ  in 7-13      Hepatic abscess            Allergies   Allergies   Allergen Reactions     Propofol      Nausea and vomiting        Health Care Home Complexity Tier:      Care Coordination Start Date: 08/30/13   Frequency of Care Coordination: every 2 weeks   Form Last Updated: 08/30/2013            CKD (chronic kidney disease) stage 3, GFR 30-59 ml/min 07/18/2013     Priority: Medium     IBS (irritable bowel syndrome) 06/20/2013     Priority: Medium     Meniere's disease (cochlear hydrops) - on prednisone and Diazide. 06/20/2013     Priority: Medium     GERD (gastroesophageal reflux disease) 06/20/2013     Priority: Medium     Mucopurulent chronic bronchitis (HCC): spirometry 7-18-13  FEV=79; FEV_FVC=65% 06/20/2013     Priority: Medium     Postmenopausal atrophic vaginitis 02/13/2013     Priority: Medium       Prior to Admission Medications   Medications Prior to Admission   Medication Sig Dispense Refill Last Dose     atorvastatin (LIPITOR) 10 MG tablet Take 10 mg by mouth At Bedtime   7/19/2021 at Unknown time     cyanocobalamin 1000 MCG SUBL Place 1,000 mcg under the tongue daily   7/19/2021 at Unknown time     diazepam (VALIUM) 2 MG tablet Take 2-4 mg by mouth daily as needed for  anxiety    Past Month at Unknown time     DULoxetine (CYMBALTA) 60 MG EC capsule TAKE 1 CAPSULE (60 MG) BY MOUTH DAILY 90 capsule 1 7/19/2021 at Unknown time     glipiZIDE (GLUCOTROL) 5 MG tablet Take 5 mg by mouth 2 times daily (before meals)    7/19/2021 at Unknown time     lamoTRIgine (LAMICTAL) 100 MG tablet Take 100 mg by mouth 2 times daily   7/19/2021 at Unknown time     vitamin D3 (CHOLECALCIFEROL) 50 mcg (2000 units) tablet Take 3 tablets by mouth daily   7/19/2021 at Unknown time     ACE/ARB NOT PRESCRIBED, INTENTIONAL, 1 each continuous prn ACE & ARB not prescribed due to CKD (Chronic Kidney Disease)        blood glucose (ACCU-CHEK FASTCLIX) lancing device FOR TESTING ONCE DAILY. DX  E11.9 TYPE 2 DIABETES        blood glucose (CONTOUR TEST) test strip TESTING EVERY DAY DX  E11.9        CONTOUR NEXT TEST test strip TESTING EVERY DAY DX  E11.9        fluconazole (DIFLUCAN) 150 MG tablet Take 150 mg by mouth See Admin Instructions One dose prn yeast infection   More than a month at Unknown time     hydrOXYzine (ATARAX) 25 MG tablet Take 25 mg by mouth nightly as needed for anxiety    More than a month at Unknown time     JARDIANCE 25 MG TABS tablet TAKE 1 TABLET BY MOUTH DAILY INSTEAD OF JANUVIA        LANsoprazole (PREVACID) 15 MG DR capsule         lidocaine (LIDODERM) 5 % patch Apply 1 patch to painful area of skin for up to 12 hours within a 24-hour period.        meloxicam (MOBIC) 7.5 MG tablet TAKE 1 TABLET BY MOUTH EVERY DAY (Patient not taking: Reported on 7/13/2021) 20 tablet 0      nystatin (MYCOSTATIN) 072767 UNIT/GM external powder         order for DME Equipment being ordered: wrist brace 1 Device 0      oxybutynin ER (DITROPAN-XL) 10 MG 24 hr tablet         oxyCODONE-acetaminophen (PERCOCET) 5-325 MG per tablet Take 1 tablet by mouth every 6 hours as needed for moderate to severe pain 7 tablet 0      predniSONE (DELTASONE) 5 MG tablet Take 1 tab daily for 7 days, 60 tablet 0      triamcinolone  "(KENALOG) 0.1 % external cream         triamterene-hydrochlorothiazide (DYAZIDE) 37.5-25 MG per capsule TAKE 1 CAPSULE BY MOUTH DAILY 90 capsule 1      zolpidem (AMBIEN) 5 MG tablet TAKE 1 TABLET BY MOUTH NIGHTLY AS NEEDED FOR SLEEP (Patient not taking: Reported on 7/13/2021) 30 tablet 0        Review of Systems  Complete ROS reviewed, unless noted in HPI, all other systems reviewed (with patient) and all others found to be negative.      Objective:     Physical Exam:  BP (!) 140/101 (BP Location: Left arm)   Pulse 87   Temp 98  F (36.7  C) (Tympanic)   Resp 16   Ht 1.651 m (5' 5\")   Wt 84.6 kg (186 lb 9.6 oz)   LMP  (LMP Unknown)   SpO2 96%   BMI 31.05 kg/m    Weight:   Vitals:    07/19/21 2305 07/20/21 0443   Weight: 83.5 kg (184 lb) 84.6 kg (186 lb 9.6 oz)      Body mass index is 31.05 kg/m .    General Appearance:  Alert, cooperative, no distress   Head:  Normocephalic, without obvious abnormality, atraumatic   Eyes:  PERRL, conjunctiva/corneas clear, EOM's intact   ENT/Throat: Lips, mucosa, and tongue normal; teeth and gums normal   Lymph/Neck: Supple, symmetrical, trachea midline   Lungs:   Clear to auscultation bilaterally, respirations unlabored   Chest Wall:  No tenderness or deformity   Cardiovascular/Heart:  Regular rate and rhythm, S1, S2 normal   Abdomen:   Soft, non-tender, bowel sounds active all four quadrants,  no masses, no organomegaly   Musculoskeletal: Extremities normal, atraumatic   Skin: Skin warm, dry    Neurologic: Alert and oriented X 3, Moves all 4 extremities     Psych: Affect is appropriate      Imaging: Reviewed  US Abdomen Limited    Result Date: 7/20/2021  EXAM: US ABDOMEN LIMITED LOCATION: [facility] DATE/TIME: 7/20/2021 7:50 AM INDICATION: Transaminitis, leukocytosis, elevated ammonia. COMPARISON: 05/04/2018 CT abdomen/pelvis. TECHNIQUE: Limited abdominal ultrasound. FINDINGS: GALLBLADDER: Normal. No gallstones, wall thickening, or pericholecystic " fluid. Negative sonographic Rodriguez's sign. BILE DUCTS: No biliary dilatation. The common duct measures 3 mm. LIVER: Severely echogenic parenchyma with smooth contour. No focal mass. RIGHT KIDNEY: No hydronephrosis. Adjacent versus lobulated right renal cyst(s) up to 3.2 x 4.5 cm. PANCREAS: The visualized portions are normal. No ascites.     IMPRESSION: 1.  Severe hepatic steatosis. 2.  Remaining exam unremarkable.     XR Chest Port 1 View    Result Date: 7/20/2021  EXAM: XR CHEST PORT 1 VIEW LOCATION: [facility] DATE/TIME: 7/20/2021 5:56 AM INDICATION: hyperglycemia, leukocytosis COMPARISON: 10/15/2018     IMPRESSION: No pleural fluid or pneumothorax. No airspace disease or edema. Normal size of the heart.    MR Brain COW Carotid wwo Contrast    Result Date: 7/20/2021  EXAM: MR BRAIN COW CAROTID WWO CONTRAST LOCATION: [facility] DATE/TIME: 7/20/2021 12:09 AM INDICATION: Confusion and word finding difficulties COMPARISON: None. CONTRAST: 8ml Gadavist TECHNIQUE: 1) Routine multiplanar multisequence head MRI without and with intravenous contrast. 2) 3D time-of-flight head MRA without intravenous contrast. 3) Neck MRA without and with IV contrast. Stenosis measurements made according to NASCET criteria unless otherwise specified. FINDINGS: HEAD MRI: INTRACRANIAL CONTENTS: No acute or subacute infarct. 8 mm dural based enhancing lesion along the right aspect of the interhemispheric falx consistent with meningioma. No significant associated mass effect or evidence of associated edema . No evidence of recent or remote intracranial hemorrhage. Scattered nonspecific T2/FLAIR hyperintensities within the cerebral white matter most consistent with mild chronic microvascular ischemic change. Mild generalized cerebral atrophy. No hydrocephalus. Normal position of the cerebellar tonsils. No additional pathologic contrast enhancement. SELLA: No abnormality accounting for  technique. OSSEOUS STRUCTURES/SOFT TISSUES: Normal marrow signal. The major intracranial vascular flow voids are maintained. ORBITS: No abnormality accounting for technique. SINUSES/MASTOIDS: No paranasal sinus mucosal disease. No middle ear or mastoid effusion. HEAD MRA: ANTERIOR CIRCULATION: No stenosis/occlusion, aneurysm, or high flow vascular malformation. Standard [place] anatomy. POSTERIOR CIRCULATION: No stenosis/occlusion, aneurysm, or high flow vascular malformation. Dominant left and smaller right vertebral artery contribute to a normal basilar artery. NECK MRA: RIGHT CAROTID: No measurable stenosis or dissection. LEFT CAROTID: No measurable stenosis or dissection. VERTEBRAL ARTERIES: No focal stenosis or dissection. Dominant left and smaller right vertebral arteries. AORTIC ARCH: Classic aortic arch anatomy with no significant stenosis at the origin of the great vessels.     IMPRESSION: HEAD MRI: 1.  No recent infarct or evidence of recent or remote intracranial hemorrhage. 2.  8 mm presumed meningioma along the right aspect of the interhemispheric falx without significant mass effect or evidence of associated edema. 3.  Mild volume loss and presumed chronic small vessel ischemic changes. HEAD MRA: 1.  Normal MRA [place]. NECK MRA: 1.  Normal neck MRA.      Labs: Reviewed   Recent Results (from the past 24 hour(s))   Glucose by meter    Collection Time: 07/19/21 11:04 PM   Result Value Ref Range    GLUCOSE BY METER POCT 481 (HH) 70 - 125 mg/dL   Basic metabolic panel    Collection Time: 07/19/21 11:22 PM   Result Value Ref Range    Sodium 133 (L) 136 - 145 mmol/L    Potassium 3.9 3.5 - 5.0 mmol/L    Chloride 93 (L) 98 - 107 mmol/L    Carbon Dioxide (CO2) 26 22 - 31 mmol/L    Anion Gap 14 5 - 18 mmol/L    Urea Nitrogen 38 (H) 8 - 28 mg/dL    Creatinine 1.76 (H) 0.60 - 1.10 mg/dL    Calcium 9.7 8.5 - 10.5 mg/dL    Glucose 522 (HH) 70 - 125 mg/dL    GFR Estimate 27 (L) >60 mL/min/1.73m2    Lipase    Collection Time: 07/19/21 11:22 PM   Result Value Ref Range    Lipase 53 (H) 0 - 52 U/L   Magnesium    Collection Time: 07/19/21 11:22 PM   Result Value Ref Range    Magnesium 2.1 1.8 - 2.6 mg/dL   Blood gas venous    Collection Time: 07/19/21 11:22 PM   Result Value Ref Range    pH Venous 7.38 7.35 - 7.45    pCO2 Venous 46 35 - 50 mm Hg    pO2 Venous 26 25 - 47 mm Hg    Bicarbonate Venous 25 24 - 30 mmol/L    Base Excess/Deficit (+/-) 2.4   mmol/L    Oxyhemoglobin Venous 36.0 (L) 70.0 - 75.0 %    O2 Sat, Venous 36.7 (L) 70.0 - 75.0 %   Hepatic function panel    Collection Time: 07/19/21 11:22 PM   Result Value Ref Range    Bilirubin Total 0.7 0.0 - 1.0 mg/dL    Bilirubin Direct 0.2 <=0.5 mg/dL    Protein Total 7.8 6.0 - 8.0 g/dL    Albumin 4.2 3.5 - 5.0 g/dL    Alkaline Phosphatase 159 (H) 45 - 120 U/L    AST 28 0 - 40 U/L    ALT 52 (H) 0 - 45 U/L   Ketone Beta-Hydroxybutyrate Quantitative    Collection Time: 07/19/21 11:22 PM   Result Value Ref Range    Ketone (Beta-Hydroxybutyrate) Quantitative 0.30 <=0.3 mmol/L   Troponin I    Collection Time: 07/19/21 11:22 PM   Result Value Ref Range    Troponin I 0.02 0.00 - 0.29 ng/mL   Ammonia    Collection Time: 07/19/21 11:22 PM   Result Value Ref Range    Ammonia 57 (H) 11 - 35 umol/L   CBC with platelets and differential    Collection Time: 07/19/21 11:22 PM   Result Value Ref Range    WBC Count 17.3 (H) 4.0 - 11.0 10e3/uL    RBC Count 5.05 3.80 - 5.20 10e6/uL    Hemoglobin 12.5 11.7 - 15.7 g/dL    Hematocrit 42.5 35.0 - 47.0 %    MCV 84 78 - 100 fL    MCH 24.8 (L) 26.5 - 33.0 pg    MCHC 29.4 (L) 31.5 - 36.5 g/dL    RDW 14.8 10.0 - 15.0 %    Platelet Count 465 (H) 150 - 450 10e3/uL    % Neutrophils 76 %    % Lymphocytes 17 %    % Monocytes 6 %    % Eosinophils 0 %    % Basophils 0 %    % Immature Granulocytes 1 %    NRBCs per 100 WBC 0 <1 /100    Absolute Neutrophils 13.3 (H) 1.6 - 8.3 10e3/uL    Absolute Lymphocytes 2.8 0.8 - 5.3 10e3/uL    Absolute  Monocytes 1.0 0.0 - 1.3 10e3/uL    Absolute Eosinophils 0.0 0.0 - 0.7 10e3/uL    Absolute Basophils 0.0 0.0 - 0.2 10e3/uL    Absolute Immature Granulocytes 0.1 (H) <=0.0 10e3/uL    Absolute NRBCs 0.0 10e3/uL   Glucose by meter    Collection Time: 07/20/21  3:08 AM   Result Value Ref Range    GLUCOSE BY METER POCT 266 (H) 70 - 125 mg/dL   UA with Microscopic reflex to Culture    Collection Time: 07/20/21  3:26 AM    Specimen: Urinary Bladder; Urine   Result Value Ref Range    Color Urine Colorless Colorless, Straw, Light Yellow, Yellow    Appearance Urine Clear Clear    Glucose Urine >1000 (A) Negative mg/dL    Bilirubin Urine Negative Negative    Ketones Urine Negative Negative mg/dL    Specific Gravity Urine 1.030 1.001 - 1.030    Blood Urine Negative Negative    pH Urine 5.0 5.0 - 7.0    Protein Albumin Urine Negative Negative mg/dL    Urobilinogen Urine <2.0 <2.0 mg/dL    Nitrite Urine Negative Negative    Leukocyte Esterase Urine Negative Negative    Bacteria Urine Few (A) None Seen /HPF    RBC Urine 0 <=2 /HPF    WBC Urine 2 <=5 /HPF   Glucose by meter    Collection Time: 07/20/21  4:41 AM   Result Value Ref Range    GLUCOSE BY METER POCT 230 (H) 70 - 125 mg/dL   Lactic Acid STAT    Collection Time: 07/20/21  6:14 AM   Result Value Ref Range    Lactic Acid 1.7 0.7 - 2.0 mmol/L   Glucose by meter    Collection Time: 07/20/21  6:24 AM   Result Value Ref Range    GLUCOSE BY METER POCT 231 (H) 70 - 125 mg/dL   Glucose by meter    Collection Time: 07/20/21 10:25 AM   Result Value Ref Range    GLUCOSE BY METER POCT 209 (H) 70 - 125 mg/dL       Total time spent 55 minutes with greater than 50% in consultation, education and coordination of care, examination of patient, review of medical record, lab and imaging results, completion of documentation, and discussion with RN, MD, Senior Acupuncturist, and pharmacist.      Thank you for this consultation.    Venita ROMANO, FNP-C  Acute Care Pain Management Program  [facility] [place] [place]  Monday-Friday 8a-4p   Page via online paging system  or call [phone]

## 2021-07-20 NOTE — PROVIDER NOTIFICATION
Pt ate meal at 1530. BG came back 379 @1650. Consulted pharmacist on if it was acceptable to give meal time insulin for coverage. Pharmacist recommended to obtain an order from provider for PRN between meal coverage.     Paged David Costello, DO at 1700 regarding insulin orders.     Smith Sanchez RN

## 2021-07-20 NOTE — ED TRIAGE NOTES
Pt presents to the ED with c/o dizziness, nausea, hyperglycemia, and confusion. Pt hx of DM II. Pt check BG >500 this evening.

## 2021-07-20 NOTE — UTILIZATION REVIEW
Admission Status; Secondary Review Determination   Under the authority of the Utilization Management Committee, the utilization review process indicated a secondary review on Tonya Yang. The review outcome is based on review of the medical records, discussions with staff, and applying clinical experience noted on the date of the review.   (x) Inpatient Status Appropriate - This patient's medical care is consistent with medical management for inpatient care and reasonable inpatient medical practice.     RATIONALE FOR DETERMINATION   80 yr old female with DM who presented to ER with glucose > 550.  Chronic prednisone but steroid injections recently and unclear etiology for high glucoses.  Also noted dehydration and PAULINE due to polyuria from uncontrolled DM.  Baseline Cr <1 and currently 1.7 with glucoses 522 and still >200.  Medications being adjusted.  Continues to require IVF at 100cc/hr for renal support.  WBC elevated for unclear etiology and having some confusion which is slowly improving.  Presented to ER on 7/19/21 and will cross 2nd night including ER time.    At the time of admission with the information available to the attending physician more than 2 nights Hospital complex care was anticipated, based on patient risk of adverse outcome if treated as outpatient and complex care required. Inpatient admission is appropriate based on the Medicare guidelines.   The information on this document is developed by the utilization review team in order for the business office to ensure compliance. This only denotes the appropriateness of proper admission status and does not reflect the quality of care rendered.   The definitions of Inpatient Status and Observation Status used in making the determination above are those provided in the CMS Coverage Manual, Chapter 1 and Chapter 6, section 70.4.   Sincerely,   Yudelka Romero MD  Utilization Review  Physician Advisor  [facility]

## 2021-07-20 NOTE — ED PROVIDER NOTES
Emergency Department Encounter     Evaluation Date & Time:   7/19/2021 11:08 PM    CHIEF COMPLAINT:  Dizziness and Hyperglycemia      Triage Note:Pt presents to the ED with c/o dizziness, nausea, hyperglycemia, and confusion. Pt hx of DM II. Pt check BG >500 this evening.         Impression and Plan     ED COURSE & MEDICAL DECISION MAKING:    I did see and examined the patient.  IV was established and she was placed on the monitor.  She was found to be significantly hyperglycemic.  This is a new finding according to the patient and according to her son.  She does have some vague confusion but no clear localizing or lateralizing deficits consistent with an acute CVA.    Given that she is having some difficulty with word finding I do feel that ruling out CVA with MRI would be most indicated.  MRI does not show an acute CVA.    Ultimately her work-up does show significant hyperglycemia with acute renal failure.    She is receiving IV fluids here I did order for 5 units of regular insulin.  Plan would be for admission for glucose control, rehydration and following her mental status.    She has been stable here, she is updated and she is in agreement    11:15 PM I met the patient, performed my physical exam, and discussed plan of care.      At the conclusion of the encounter I discussed the results of all the tests and the disposition. The questions were answered. The patient or family acknowledged understanding and was agreeable with the care plan.    FINAL IMPRESSION:    ICD-10-CM    1. Hyperglycemia  R73.9    2. Confusion  R41.0    3. Acute renal failure, unspecified acute renal failure type (H)  N17.9          Reassessments & Consults       MEDICATIONS GIVEN IN THE EMERGENCY DEPARTMENT:  Medications   LORazepam (ATIVAN) injection 1 mg (1 mg Intravenous Given 7/19/21 9364)   0.9% sodium chloride BOLUS (1,000 mLs Intravenous New Bag 7/20/21 7405)       NEW PRESCRIPTIONS STARTED AT TODAY'S ED VISIT:  New Prescriptions     No medications on file       HPI     The history is provided by the patient and a relative.        Tonya Yang is a 80 year old female with a pertinent history of chronic pain, spinal stenosis,  and diabetes mellitus type II who presents to this ED by walk in for evaluation of hyperglycemia.      Per patient's son, her blood glucose was 460 this morning which is abnormal for her. Throughout the day her son was out with her shopping. Her blood glucose continued to rise throughout the day reaching 560 this evening. Patient is also nauseous, dizzy, confused, and very anxious. She also reports back pain secondary to hyperglycemia. She states her back pain makes it hard for her to walk. She denies any other complaints at this time.    REVIEW OF SYSTEMS:  Review of Systems   Gastrointestinal: Positive for nausea.   Genitourinary:        Positive for hyperglycemia.   Neurological: Positive for dizziness.   Psychiatric/Behavioral: Positive for confusion.        Positive for anxiety.   All other systems reviewed and are negative.          Medical History     Past Medical History:   Diagnosis Date     Abdominal pain      Anxiety      Arthritis      Asthma      Bipolar 1 disorder (H)      Chronic pain      Cochlear hydrops 1988     COPD (chronic obstructive pulmonary disease) (H)      Depression      Diabetes mellitus (H)      Dyspepsia      GERD (gastroesophageal reflux disease)      Hard of hearing      Hepatic abscess      Hyperlipidemia      Hypertension      Irritable bowel syndrome      Meniere disease      Neuropathy      Noninfectious ileitis      Peritoneal abscess (H)      Spinal stenosis of lumbar region      Steroid long-term use      Vaginitis, atrophic, postmenopausal      Vitamin D deficiency        Past Surgical History:   Procedure Laterality Date     CATARACT IOL, RT/LT Left 7/2013     FOOT SURGERY      toe     GI SURGERY       LAPAROSCOPIC HEPATECTOMY PARTIAL  11/4/2013    Procedure: LAPAROSCOPIC  HEPATECTOMY PARTIAL;  Laparoscopic Debridement of Liver Abcess;  Surgeon: Sepideh Green MD;  Location: [facility] OR     ORTHOPEDIC SURGERY       PICC INSERTION  2013    5fr DL Power PICC, 41cm (1cm external length) in the R lateral brachial vein with tip in the SVC RA junction.     RECTAL SURGERY      1970s     VASCULAR SURGERY         Family History   Problem Relation Age of Onset     Cerebrovascular Disease Mother      Heart Disease Mother      Heart Disease Father      Heart Disease Brother      Diabetes No family hx of      Coronary Artery Disease No family hx of      Hypertension No family hx of      Hyperlipidemia No family hx of      Breast Cancer No family hx of      Colon Cancer No family hx of      Prostate Cancer No family hx of      Other Cancer No family hx of      Depression No family hx of      Anxiety Disorder No family hx of      Mental Illness No family hx of      Substance Abuse No family hx of      Anesthesia Reaction No family hx of      Asthma No family hx of      Osteoporosis No family hx of      Genetic Disorder No family hx of      Thyroid Disease No family hx of      Obesity No family hx of      Unknown/Adopted No family hx of        Social History     Tobacco Use     Smoking status: Former Smoker     Types: Cigarettes     Quit date: 11/15/1987     Years since quittin.7     Smokeless tobacco: Former User   Substance Use Topics     Alcohol use: Not Currently     Alcohol/week: 0.0 standard drinks     Comment: MALISSA -paco since      Drug use: No     Comment: used [employer] in the past       ACE/ARB NOT PRESCRIBED, INTENTIONAL,  allopurinol (ZYLOPRIM) 100 MG tablet  atorvastatin (LIPITOR) 20 MG tablet  blood glucose (ACCU-CHEK FASTCLIX) lancing device  blood glucose (CONTOUR TEST) test strip  CONTOUR NEXT TEST test strip  Diazepam (VALIUM PO)  divalproex (DEPAKOTE ER) 500 MG 24 hr tablet  divalproex sodium delayed-release (DEPAKOTE) 500 MG DR tablet  DULoxetine  (CYMBALTA) 60 MG EC capsule  furosemide (LASIX) 20 MG tablet  glimepiride (AMARYL) 2 MG tablet  glipiZIDE (GLUCOTROL) 5 MG tablet  hydrocortisone valerate (WEST-HUEY) 0.2 % external ointment  hydrOXYzine (ATARAX) 25 MG tablet  hydrOXYzine (VISTARIL) 25 MG capsule  JARDIANCE 25 MG TABS tablet  LANsoprazole (PREVACID) 15 MG DR capsule  lidocaine (LIDODERM) 5 % patch  meloxicam (MOBIC) 7.5 MG tablet  nystatin (MYCOSTATIN) 001652 UNIT/GM external powder  order for DME  oxybutynin ER (DITROPAN-XL) 10 MG 24 hr tablet  oxyCODONE-acetaminophen (PERCOCET) 5-325 MG per tablet  predniSONE (DELTASONE) 5 MG tablet  triamcinolone (KENALOG) 0.1 % external cream  triamterene-hydrochlorothiazide (DYAZIDE) 37.5-25 MG per capsule  zolpidem (AMBIEN) 5 MG tablet        Physical Exam     First Vitals:  Patient Vitals for the past 24 hrs:   BP Temp Temp src Pulse Resp SpO2 Weight   07/20/21 0230 (!) 172/78 -- -- 75 18 93 % --   07/20/21 0215 (!) 153/62 -- -- 79 17 94 % --   07/20/21 0200 (!) 169/76 -- -- 81 19 96 % --   07/20/21 0135 (!) 172/152 -- -- 85 (!) 40 95 % --   07/20/21 0000 (!) 185/81 -- -- 89 (!) 36 95 % --   07/19/21 2330 (!) 155/81 -- -- 92 30 96 % --   07/19/21 2305 136/69 98.2  F (36.8  C) Oral 97 18 97 % 83.5 kg (184 lb)       PHYSICAL EXAM:   Physical Exam  Constitutional:       General: She is not in acute distress.     Appearance: She is not diaphoretic.   HENT:      Head: Atraumatic.      Mouth/Throat:      Pharynx: No oropharyngeal exudate.   Eyes:      General: No scleral icterus.     Pupils: Pupils are equal, round, and reactive to light.   Cardiovascular:      Heart sounds: Normal heart sounds.   Pulmonary:      Effort: No respiratory distress.      Breath sounds: Normal breath sounds.   Abdominal:      General: Bowel sounds are normal.      Palpations: Abdomen is soft.      Tenderness: There is no abdominal tenderness.   Musculoskeletal:         General: No tenderness.   Skin:     General: Skin is warm.       Findings: No rash.   Neurological:      Mental Status: She is alert and oriented to person, place, and time.      Comments: She is awake and alert.  She moves all 4 extremities with normal purpose and symmetric strength.  She does have some difficulty with word finding that her son states is chronic for her but slightly more exaggerated today   Psychiatric:         Mood and Affect: Mood is anxious.           Results     LAB:  All pertinent labs reviewed and interpreted  Labs Ordered and Resulted from Time of ED Arrival Up to the Time of Departure from the ED   BASIC METABOLIC PANEL - Abnormal; Notable for the following components:       Result Value    Sodium 133 (*)     Chloride 93 (*)     Urea Nitrogen 38 (*)     Creatinine 1.76 (*)     Glucose 522 (*)     GFR Estimate 27 (*)     All other components within normal limits   LIPASE - Abnormal; Notable for the following components:    Lipase 53 (*)     All other components within normal limits   BLOOD GAS VENOUS - Abnormal; Notable for the following components:    Oxyhemoglobin Venous 36.0 (*)     O2 Sat, Venous 36.7 (*)     All other components within normal limits   HEPATIC FUNCTION PANEL - Abnormal; Notable for the following components:    Alkaline Phosphatase 159 (*)     ALT 52 (*)     All other components within normal limits   AMMONIA - Abnormal; Notable for the following components:    Ammonia 57 (*)     All other components within normal limits   CBC WITH PLATELETS AND DIFFERENTIAL - Abnormal; Notable for the following components:    WBC Count 17.3 (*)     MCH 24.8 (*)     MCHC 29.4 (*)     Platelet Count 465 (*)     Absolute Neutrophils 13.3 (*)     Absolute Immature Granulocytes 0.1 (*)     All other components within normal limits   GLUCOSE BY METER - Abnormal; Notable for the following components:    GLUCOSE BY METER POCT 481 (*)     All other components within normal limits   MAGNESIUM - Normal   KETONE BETA-HYDROXYBUTYRATE QUANTITATIVE, RAPID - Normal    TROPONIN I - Normal   ROUTINE UA WITH MICROSCOPIC REFLEX TO CULTURE   PULSE OXIMETRY NURSING   CARDIAC CONTINUOUS MONITORING   PERIPHERAL IV CATHETER   CALL   CBC WITH PLATELETS & DIFFERENTIAL    Narrative:     The following orders were created for panel order CBC with platelets differential.  Procedure                               Abnormality         Status                     ---------                               -----------         ------                     CBC with platelets and d...[[id number]]  Abnormal            Final result                 Please view results for these tests on the individual orders.       RADIOLOGY:  MR Brain COW Carotid wwo Contrast   Final Result   IMPRESSION:   HEAD MRI:    1.  No recent infarct or evidence of recent or remote intracranial hemorrhage.   2.  8 mm presumed meningioma along the right aspect of the interhemispheric falx without significant mass effect or evidence of associated edema.   3.  Mild volume loss and presumed chronic small vessel ischemic changes.      HEAD MRA:    1.  Normal MRA [place].      NECK MRA:   1.  Normal neck MRA.                       Jairo Stewart MD  Emergency Medicine  [facility] EMERGENCY ROOM       Jairo Stewart MD  07/20/21 2809

## 2021-07-21 ENCOUNTER — APPOINTMENT (OUTPATIENT)
Dept: PHYSICAL THERAPY | Facility: CLINIC | Age: 81
DRG: 638 | End: 2021-07-21
Attending: HOSPITALIST
Payer: MEDICARE

## 2021-07-21 ENCOUNTER — HOSPITAL ENCOUNTER (INPATIENT)
Dept: MRI IMAGING | Facility: CLINIC | Age: 81
DRG: 638 | End: 2021-07-21
Attending: HOSPITALIST
Payer: MEDICARE

## 2021-07-21 LAB
ANION GAP SERPL CALCULATED.3IONS-SCNC: 9 MMOL/L (ref 5–18)
BUN SERPL-MCNC: 22 MG/DL (ref 8–28)
CALCIUM SERPL-MCNC: 9 MG/DL (ref 8.5–10.5)
CHLORIDE BLD-SCNC: 107 MMOL/L (ref 98–107)
CO2 SERPL-SCNC: 27 MMOL/L (ref 22–31)
CREAT SERPL-MCNC: 0.85 MG/DL (ref 0.6–1.1)
ERYTHROCYTE [DISTWIDTH] IN BLOOD BY AUTOMATED COUNT: 15.1 % (ref 10–15)
GFR SERPL CREATININE-BSD FRML MDRD: 66 ML/MIN/1.73M2
GLUCOSE BLD-MCNC: 197 MG/DL (ref 70–125)
GLUCOSE BLDC GLUCOMTR-MCNC: 170 MG/DL (ref 70–125)
GLUCOSE BLDC GLUCOMTR-MCNC: 273 MG/DL (ref 70–125)
GLUCOSE BLDC GLUCOMTR-MCNC: 285 MG/DL (ref 70–125)
GLUCOSE BLDC GLUCOMTR-MCNC: 301 MG/DL (ref 70–125)
HCT VFR BLD AUTO: 37.6 % (ref 35–47)
HGB BLD-MCNC: 11.1 G/DL (ref 11.7–15.7)
MCH RBC QN AUTO: 25.1 PG (ref 26.5–33)
MCHC RBC AUTO-ENTMCNC: 29.5 G/DL (ref 31.5–36.5)
MCV RBC AUTO: 85 FL (ref 78–100)
PLATELET # BLD AUTO: 353 10E3/UL (ref 150–450)
POTASSIUM BLD-SCNC: 3.6 MMOL/L (ref 3.5–5)
RBC # BLD AUTO: 4.42 10E6/UL (ref 3.8–5.2)
SODIUM SERPL-SCNC: 143 MMOL/L (ref 136–145)
WBC # BLD AUTO: 15.3 10E3/UL (ref 4–11)

## 2021-07-21 PROCEDURE — 250N000011 HC RX IP 250 OP 636: Performed by: HOSPITALIST

## 2021-07-21 PROCEDURE — 97116 GAIT TRAINING THERAPY: CPT | Mod: GP

## 2021-07-21 PROCEDURE — 99233 SBSQ HOSP IP/OBS HIGH 50: CPT | Performed by: HOSPITALIST

## 2021-07-21 PROCEDURE — 97162 PT EVAL MOD COMPLEX 30 MIN: CPT | Mod: GP

## 2021-07-21 PROCEDURE — 120N000001 HC R&B MED SURG/OB

## 2021-07-21 PROCEDURE — 250N000012 HC RX MED GY IP 250 OP 636 PS 637: Performed by: HOSPITALIST

## 2021-07-21 PROCEDURE — 250N000013 HC RX MED GY IP 250 OP 250 PS 637: Performed by: HOSPITALIST

## 2021-07-21 PROCEDURE — A9585 GADOBUTROL INJECTION: HCPCS | Performed by: HOSPITALIST

## 2021-07-21 PROCEDURE — 255N000002 HC RX 255 OP 636: Performed by: HOSPITALIST

## 2021-07-21 PROCEDURE — 36415 COLL VENOUS BLD VENIPUNCTURE: CPT | Performed by: HOSPITALIST

## 2021-07-21 PROCEDURE — 250N000013 HC RX MED GY IP 250 OP 250 PS 637: Performed by: NURSE PRACTITIONER

## 2021-07-21 PROCEDURE — 85027 COMPLETE CBC AUTOMATED: CPT | Performed by: HOSPITALIST

## 2021-07-21 PROCEDURE — 80048 BASIC METABOLIC PNL TOTAL CA: CPT | Performed by: HOSPITALIST

## 2021-07-21 PROCEDURE — 72158 MRI LUMBAR SPINE W/O & W/DYE: CPT

## 2021-07-21 RX ORDER — GABAPENTIN 100 MG/1
200 CAPSULE ORAL AT BEDTIME
Status: DISCONTINUED | OUTPATIENT
Start: 2021-07-21 | End: 2021-07-22

## 2021-07-21 RX ORDER — HYDROMORPHONE HYDROCHLORIDE 1 MG/ML
0.3 INJECTION, SOLUTION INTRAMUSCULAR; INTRAVENOUS; SUBCUTANEOUS EVERY 4 HOURS PRN
Status: DISCONTINUED | OUTPATIENT
Start: 2021-07-21 | End: 2021-07-21 | Stop reason: ALTCHOICE

## 2021-07-21 RX ORDER — OXYCODONE HYDROCHLORIDE 5 MG/1
5 TABLET ORAL EVERY 4 HOURS PRN
Status: DISCONTINUED | OUTPATIENT
Start: 2021-07-21 | End: 2021-07-24

## 2021-07-21 RX ORDER — GADOBUTROL 604.72 MG/ML
8 INJECTION INTRAVENOUS ONCE
Status: COMPLETED | OUTPATIENT
Start: 2021-07-21 | End: 2021-07-21

## 2021-07-21 RX ORDER — ACETAMINOPHEN 325 MG/1
975 TABLET ORAL 3 TIMES DAILY
Status: DISCONTINUED | OUTPATIENT
Start: 2021-07-21 | End: 2021-07-26 | Stop reason: HOSPADM

## 2021-07-21 RX ORDER — DIAZEPAM 2 MG
2-4 TABLET ORAL EVERY 6 HOURS PRN
Status: DISCONTINUED | OUTPATIENT
Start: 2021-07-21 | End: 2021-07-22

## 2021-07-21 RX ADMIN — INSULIN HUMAN 20 UNITS: 100 INJECTION, SUSPENSION SUBCUTANEOUS at 12:39

## 2021-07-21 RX ADMIN — PANTOPRAZOLE SODIUM 40 MG: 20 TABLET, DELAYED RELEASE ORAL at 06:52

## 2021-07-21 RX ADMIN — DULOXETINE HYDROCHLORIDE 60 MG: 60 CAPSULE, DELAYED RELEASE PELLETS ORAL at 08:26

## 2021-07-21 RX ADMIN — LIDOCAINE 3 PATCH: 246 PATCH TOPICAL at 08:26

## 2021-07-21 RX ADMIN — ATORVASTATIN CALCIUM 10 MG: 10 TABLET, FILM COATED ORAL at 20:40

## 2021-07-21 RX ADMIN — INSULIN ASPART 1 UNITS: 100 INJECTION, SOLUTION INTRAVENOUS; SUBCUTANEOUS at 06:54

## 2021-07-21 RX ADMIN — ACETAMINOPHEN 975 MG: 325 TABLET ORAL at 10:45

## 2021-07-21 RX ADMIN — DIAZEPAM 4 MG: 2 TABLET ORAL at 08:35

## 2021-07-21 RX ADMIN — OXYCODONE HYDROCHLORIDE AND ACETAMINOPHEN 1 TABLET: 5; 325 TABLET ORAL at 04:27

## 2021-07-21 RX ADMIN — ACETAMINOPHEN 975 MG: 325 TABLET ORAL at 20:40

## 2021-07-21 RX ADMIN — LAMOTRIGINE 100 MG: 100 TABLET ORAL at 08:26

## 2021-07-21 RX ADMIN — ACETAMINOPHEN 325 MG: 325 TABLET ORAL at 06:52

## 2021-07-21 RX ADMIN — GADOBUTROL 8 ML: 604.72 INJECTION INTRAVENOUS at 11:42

## 2021-07-21 RX ADMIN — OXYBUTYNIN CHLORIDE 10 MG: 5 TABLET, FILM COATED, EXTENDED RELEASE ORAL at 20:40

## 2021-07-21 RX ADMIN — GABAPENTIN 200 MG: 100 CAPSULE ORAL at 20:40

## 2021-07-21 RX ADMIN — OXYCODONE HYDROCHLORIDE 5 MG: 5 TABLET ORAL at 15:55

## 2021-07-21 RX ADMIN — ACETAMINOPHEN 975 MG: 325 TABLET ORAL at 15:55

## 2021-07-21 RX ADMIN — LAMOTRIGINE 100 MG: 100 TABLET ORAL at 20:40

## 2021-07-21 RX ADMIN — PREDNISONE 6 MG: 1 TABLET ORAL at 08:26

## 2021-07-21 RX ADMIN — HYDROMORPHONE HYDROCHLORIDE 0.3 MG: 1 INJECTION, SOLUTION INTRAMUSCULAR; INTRAVENOUS; SUBCUTANEOUS at 10:46

## 2021-07-21 NOTE — CONSULTS
Care Management Initial Consult    General Information  Assessment completed with: Children, Son, Davie  Type of CM/SW Visit: Initial Assessment    Primary Care Provider verified and updated as needed: Yes   Readmission within the last 30 days: no previous admission in last 30 days      Reason for Consult: discharge planning  Advance Care Planning: Advance Care Planning Reviewed: concerns discussed (Healthcare directive paperwork provided)          Communication Assessment  Patient's communication style: spoken language (English or Bilingual)    Hearing Difficulty or Deaf: yes   Wear Glasses or Blind: no    Cognitive  Cognitive/Neuro/Behavioral: mood/behavior, orientation  Level of Consciousness: alert  Arousal Level: opens eyes spontaneously  Orientation: other (see comments) (Forgetful, fluctuates)  Mood/Behavior: hyperactive (agitated, impulsive), restless  Best Language: 0 - No aphasia  Speech: rambling    Living Environment:   People in home: alone     Current living Arrangements: apartment      Able to return to prior arrangements: yes       Family/Social Support:  Care provided by: self  Provides care for: no one  Marital Status:   Children, Other (specify) ()          Description of Support System: Supportive         Current Resources:   Patient receiving home care services:       Community Resources:    Equipment currently used at home: cane, straight  Supplies currently used at home:      Employment/Financial:  Employment Status: retired        Financial Concerns: No concerns identified   Referral to Financial Counselor: No       Lifestyle & Psychosocial Needs:  Social Determinants of Health     Tobacco Use: Medium Risk     Smoking Tobacco Use: Former Smoker     Smokeless Tobacco Use: Former User   Alcohol Use:      Frequency of Alcohol Consumption:      Average Number of Drinks:      Frequency of Binge Drinking:    Financial Resource Strain:      Difficulty of Paying Living Expenses:     Food Insecurity:      Worried About Running Out of Food in the Last Year:      Ran Out of Food in the Last Year:    Transportation Needs:      Lack of Transportation (Medical):      Lack of Transportation (Non-Medical):    Physical Activity:      Days of Exercise per Week:      Minutes of Exercise per Session:    Stress:      Feeling of Stress :    Social Connections:      Frequency of Communication with Friends and Family:      Frequency of Social Gatherings with Friends and Family:      Attends [employer] Services:      Active Member of Clubs or Organizations:      Attends Club or Organization Meetings:      Marital Status:    Intimate Partner Violence:      Fear of Current or Ex-Partner:      Emotionally Abused:      Physically Abused:      Sexually Abused:    Depression: Not at risk     PHQ-2 Score: 0   Housing Stability:      Unable to Pay for Housing in the Last Year:      Number of Places Lived in the Last Year:      Unstable Housing in the Last Year:        Functional Status:  Prior to admission patient needed assistance:              Mental Health Status:          Chemical Dependency Status:                Values/Beliefs:  Spiritual, Cultural Beliefs, Advent Practices, Values that affect care: no               Additional Information:  The assessment was conducted with the patient's son, Davie, because the nursing staff was giving the patient time to rest. At baseline, the patient lives alone in [facility], in an apartment, and she gets assistance with IADLs, but is mostly independent with ADLs. Davie is the patient's PCA, and he gets 6 hours per week. He assists her with tasks like meals, laundry, transportation, shopping, and medications. The patient also has a  who will help with tasks like shopping. The patient does use a cane, but does not use a walker. Will need PT and OT recommendations, anticipate the patient will discharge with home care services vs. TCU. Davie requested a  home RN to help with med set-up. Davie may be able to provide transportation.     Omid Pérez RN

## 2021-07-21 NOTE — PROGRESS NOTES
[facility] Medicine PROGRESS NOTE      Identification/Summary:   Pt seen, examined. Admitted earlier this morning.      Presented with hyperglycemia, sugars over 400, possibly over 500. Recently had kenalog injections in rheum clinic. Also chronically on prednisone. She also says that she recently stopped metformin due to diarrhea. Says that she has been extremely thirsty, says she would drink the whole river if she could.     Hospital stay has been complicated by severe low back pain, radiating down right leg and worsening urinary incontinence. Trying to increase pain meds, starting insulin.     Assessment and Plan:  Hyperglycemia  Likely related to recent steroid injections, prednisone  Not sure which DM meds she is actually taking, definitely stopped metformin  Will start lantus 10 units qam, continue sliding scale with meals  Will consult DM educator     Confusion  Unclear etiology, could be meds, dehydration  Had elevated ammonia level, not encephalopathic today  No clear source of infection  MRI neg other than incidental meningioma  Not a drinker     Fatty liver  Noted on US abdomen  Recommend outpt follow up     NIDDM  Typically on glipizide, jardiance  A1c over 10  Will need insulin, will not resume oral meds  Will try 70/30 10 units bid  Addendum: spoke with DM ed, will stop 70/30, try NPH 20 units qam  Will try to simplify insulin regimen as much as possible  Will avoid sliding scale, carb counts  Goal is to keep sugars below 250 as much as possible without too many lows  Appreciate DM educator help     PAULINE  Prerenal due to dehydration, hyperglycemia  Creat normalized, tolerating oral intake, stop fluids     Acute on chronic pain  Describes worsening joint pains, hands, bilat shoulder, rt leg  Appreciate pain team help  Says that she is typically on percocet  Also really helped by salonpas patches recently  Very distraught over pain today, radicular symptoms says incontinence is worsening  Will  check MR lumbar spine  Seems she is rx'd mobic, lidocaine patch  Holding NSAIDs due to PAULINE  Will order scheduled tylenol, increase frequency of oxycodone  Will give IV dilaudid prn if she is in severe distress and pre MRI       Hearing loss, cochlear hydrops  Takes low dose prednisone chronically for this per pt.      Mental illness  Anxiety seems significant, son also concerned about this  Got valium this morning  Continue her home meds for this, med rec pending, follow clinically    Diet: Consistent Carb 90 grams CHO per Meal Diet  Fluids: stop now  Pain meds: as above  Therapy:will ask for pt and ot consults, wonder if she would benefit from TCU or increase care at home  DVT Prophylaxis:  Low risk, ambulating in room, SCDs when resting  Code Status: Full Code  Disposition: stay today    Interval History/Subjective:  Complains of severe back pain, rt leg pain, also says that her urinary incont is worse. No fecal incont. No fever. No chest pain. Says that she is often short of breath. No nausea, vomiting. Pain seems to start in back and travel down right leg below the knee, also left thigh.     Physical Exam/Objective:  Gen: alert, anxious, distressed, tearful, quite talkative, not at all sedated  ENT: no scleral icterus  Pulm: lungs are clear without wheezing, crackles  CV: regular rate and rhythm, no pitting edema  GI: abdomen is soft, non-tender, non-distended with active bowel sounds.  Neuro: sensation is intact in the rt leg, able to move it well, able to bear weight  Psych: anxious    Medications:     acetaminophen  975 mg Oral TID     atorvastatin  10 mg Oral At Bedtime     DULoxetine  60 mg Oral Daily     insulin aspart  1-3 Units Subcutaneous TID AC     insulin aspart  1-3 Units Subcutaneous At Bedtime     insulin aspart prot & aspart  14 Units Subcutaneous BID AC     insulin regular (HumuLIN R,NovoLIN R) for IV use  5 Units Intravenous Once     lamoTRIgine  100 mg Oral BID     lidocaine  3 patch  Transdermal Q24H     lidocaine   Transdermal Q8H     lidocaine   Transdermal Daily     oxybutynin ER  10 mg Oral At Bedtime     pantoprazole  40 mg Oral QAM AC     predniSONE  6 mg Oral Daily     sodium chloride (PF)  3 mL Intracatheter Q8H     [Held by provider] triamterene-HCTZ  1 capsule Oral Daily       David Costello DO   Hospitalist  [facility]

## 2021-07-21 NOTE — PLAN OF CARE
"On initial assessment, pt was complaining of \"worse than 10/10 pain\". Scheduled lidocaine patches were placed on RLE with no reported relief. Pain radiates from low back and \"shoots\" down RLE on the lateral side. She has no focal weakness to RLE but does complain of bilateral lower extremity numbness. She is a very difficult historian but reports worsening incontinence over the past few days stating she \"now needs a diaper and has never before\". Pt is not incontinent of stool. MD updated, new ordered for STAT MRI and pain medication. Pt was given 0.3 mg IV dilaudid and reported \"complete relief\" of her pain. Pt is alert and oriented but is very forgetful and rambles on, talking in circles. She is unable to hold a logical conversation. She is often tearful during conversations. Around 1500, pt started complaining of severe pain again, pain controlled with PRN oxycodone and scheduled tylenol. Alarms in place for pt safety, she rarely calls to make her needs know, rather she gets up and sets alarm off. RN updated MD about pt ongoing anxiety, new ordered were placed. MD called RN to update on Spine Surgery consult due to MRI results. RN talked with Spine Surgery PA, see note. 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 Pt is able to read lips well. No difficulty conversing due to [place]. Set up for  services to assist tomorrow.   "

## 2021-07-21 NOTE — PLAN OF CARE
Problem: Adult Inpatient Plan of Care  Goal: Plan of Care Review  Outcome: Improving     Problem: Pain Acute  Goal: Acceptable Pain Control and Functional Ability  Outcome: Improving    Pt tearful in pain post percocet admin. MD aware. PRN IV dilaudid administered. Post IV dilaudid administration pt stated she fell asleep. Pt then proceeded to state that she had relief from pain and that she was still in pain.  Pt forgetful at times. Bed alarms in place.   PRN tylenol administered early this morning. Ice in place.

## 2021-07-21 NOTE — PROGRESS NOTES
"Diabetes Care Follow-Up Note  Situation:  Diabetes Ed consulted yesterday for new insulin administration. Did some education with son but unable to do with pt d/t difficulty keeping her on topic. Her son noted he was concerned with mothers ability to do insulin at home.     Spoke with MD today, given A1C hoping to do insulin. Will try to make plan as simple as possible. Trial NPH 1x per day, give with steroids.     Background:  See note from 7/20/21.    Meds for BG Management PTA:  -Jardiance 25mg daily  -Glipizide 5mg two times per day  -Prednisone 6mg daily    Current Inpatient diabetes meds:  -NPH 20u AM-started today  -Prednisone 6mg daily    Blood Glucose Labs:  A1C: 10.8% (7/20/21)  Hemoglobin: NA  Creat: 0.85  GFR: 66  Glucose POC:   Results for TROY JARA (MRN [id number]) as of 7/21/2021 14:51   Ref. Range 7/21/2021 06:57 7/21/2021 08:54 7/21/2021 08:55 7/21/2021 11:41 7/21/2021 12:16   GLUCOSE BY METER POCT Latest Ref Range: 70 - 125 mg/dL 170 (H)    301 (H)     Current Education and/or visit with Patient and/or caregiver(s):Met with pt today for insulin teaching. Educated on how to use insulin pen and had pt do steps herself with my verbal cues. Pt did ok with this. She then wanted to try and do it without any verbal cues. I gave her written insulin pen instructions to use if needed. Pt was unable to set up pen, she could not figure out where to put the pen needle. Encouraged her to read the paper but she was too tired and wanted to rest, noted she had valium and pain medication today.  She requested more education at a later time. Discussed that I will see her tomorrow and also have nursing go over this with her. Suggested maybe her son could help her as she had mentioned this in past, she then said \"no he can't be there to do it all the time\".     Pt also noted during discussion that she was not doing well about following diabetic diet for about 1 month. Is wanting to eat better.     Assessment: At " this time, pt unable to demonstrate ability to use insulin pen. Will continue working with her and requested nursing to review insulin pen technique when giving insulin. I question if patient will be able to do this by herself. Also she reports her son can not be there to help her all the time. If she needs insulin at discharge will likely need assistance with this.        Recommendations:  - Monitor BG and adjust diabetes medications as needed. Rec adding back correction insulin while inpatient.   -Consider cognitive eval, at this time pt does not seem to understand how to do insulin after teaching. Not sure if this could change with ongoing teaching.   -Glipizide dose could be increased if needed. Also could consider other medications OP (GLP-1-ideally could get 1x per week injection that family could help with).   -Ongoing diet education     Follow-Up:  Will f/u tomorrow.     Gaby Salazar RD, LD, [facility] [place]  Diabetes Educator  Pager: [phone]

## 2021-07-21 NOTE — PROVIDER NOTIFICATION
8:43 AM Talked with Dr. Costello, pt is having worsening leg pain radiating down entire right leg. New/Worsening incontinence reported by pt. Difficult to get a clear history, pt is very anxious. New orders. FRANCISCA BENITEZ RN

## 2021-07-21 NOTE — PROVIDER NOTIFICATION
5:55 PM Talked with Jeana Bernstein with [facility] regarding the pt. Gave a brief history of symptoms and MRI impression results. Jeana is going to discuss with the doctor and call back.     7:02 PM talked with Jeana Carver who informed Dr. Tani Velásquez will be the surgeon consulted tonight, she is doubtful that any emergency surgery will be done tonight. Most likely someone will come evaluate the patient tomorrow morning. In the meantime, specific s/s to alert about are continuously incontinent of urine and stool, profound weakness, Leg paralysis.     7:25 PM Talked with Dr. Velásquez who is en rout to the hospital. Updated on the situation. He will be here in 5 minutes to evaluate the patient.

## 2021-07-21 NOTE — PROVIDER NOTIFICATION
4:36 PM Text Paged Dr. Dr. Costello: Pt appears anxious about pain getting too bad tonight. Used daily dose of PRN Valium, is there anything addition to order incase she gets more anxious tonight?  Waiting for response. FRANCISCA BENITEZ RN Callback #[phone]     4:57 PM New ordered placed by Dr. Costello, PRN Valium and Psych consult.

## 2021-07-21 NOTE — PROGRESS NOTES
[facility] ACUTE PAIN SERVICE    Daily PAIN Progress Note    Assessment/Plan:  Tonya Yang is a 80 year old female who was admitted on 7/19/2021.  Pain team was asked to see the patient for acute on chronic pain, worsening joint pain. Admitted for hyperglycemia. History of DM, PAULINE, chronic low back pain radiating into RLE, hearing loss, anxiety, asthma, COPD, DM, GERD, hypertension, hyperlipidemia, neuropathy.  Describes pain as 10/10 at times, low back and tingling shooting pain in RLE. Patient has utilized 3 Percocet in the last 24 hours, MME about 22 mg. The patient denies N/V/C/D, chest pain, sob. Patient is reporting the pain medication is not working that well. She reported she had taken gabapentin in the past, but does not know why it was discontinued. MRI lumbar spine ordered. We discussed some side effects fo gabapentin, and she is agreeable to try it again .Diet is consistent carb diet. The patient does no longer smoke and denies chemical dependency history.      Opioid Induced Respiratory Depression Risk Assessment:?high (age, renal, COPD, )    Discussed plan with Venita Cano CNP, Acute Pain Management Team     PLAN:   1) Pain is consistent with chronic pain, with exacerbation.The patient's home MME was 30 mg daily.   2)Multimodal Medication Therapy  Topical: icy hot q6hprn, lidocaine patches x3  NSAID'S: CrCl 28ml/min.  None, PAULINE, on prednisone daily   Muscle Relaxants:  None   Adjuvants: Tylenol 975 mg po tid ordered by Hospitalist, trial of gabapentin 200 mg q hs.  Antidepressants/anxiolytics: Cymbalta 60 mg daily, valium prn - recommend discontinue and would not recommend with concurrent use of opioids - not on  in the past year   Opioids: Percocet 5/325 changed to oxycodone 5 mg po q 4 h prn per Hospitalist.  IV Pain medication: discontinue IV Dilaudid  3)Non-medication interventions: ice, heat   Acupuncture consult, Integrative consult - if patient agreeable   4)Constipation Prophylaxis:  "prn senna, miralax, supp   5) Care Teams: [facility]     -Opioid prescriber has been [place] PMP pulled from system on 7/20/21. This indicates chronic Percocet use  7/12 Percocet 5/325mg 120 for 30 d  6/10 Percocet 120 for 30  5/6 same  4/6 same  3/4 same  Discharge Recommendations - We recommend prescribing the following at the time of discharge: No opioids.  Follow up PCP.       Active Problems:    Confusion    Hyperglycemia    Acute renal failure, unspecified acute renal failure type (H)      Objective:  Vital signs in last 24 hours:  BP (!) 176/75 (BP Location: Left arm)   Pulse 78   Temp 97.8  F (36.6  C) (Oral)   Resp 18   Ht 1.651 m (5' 5\")   Wt 84.6 kg (186 lb 9.6 oz)   LMP  (LMP Unknown)   SpO2 96%   BMI 31.05 kg/m    Weight:   Vitals:    07/19/21 2305 07/20/21 0443   Weight: 83.5 kg (184 lb) 84.6 kg (186 lb 9.6 oz)      Weight change:   Body mass index is 31.05 kg/m .    Intake/Output last 3 shifts:  I/O last 3 completed shifts:  In: 3148 [P.O.:480; I.V.:2668]  Out: 926 [Urine:925; Stool:1]  Intake/Output this shift:  No intake/output data recorded.    Review of Systems:   As per subjective, all others negative.    Physical Exam:  General Appearance:  Alert, cooperative, no distress, appears stated age   Head:  Normocephalic, without obvious abnormality, atraumatic   Eyes:  PERRL, conjunctiva/corneas clear, EOM's intact   Nose: Nares normal, septum midline   Throat: Lips, mucosa, and tongue normal; teeth and gums normal   Neck: Supple, symmetrical, trachea midline   Back:   Symmetric, no curvature, ROM normal   Lungs:   Clear to auscultation bilaterally, respirations unlabored   Chest Wall:  No tenderness or deformity   Heart:  Regular rate and rhythm, S1, S2 normal    Abdomen:   Soft, non-tender, bowel sounds active all four quadrants,  no masses, no organomegaly    Extremities: Extremities normal, atraumatic, no cyanosis or edema   Skin: Skin color, texture, turgor normal, no rashes or " lesions   Neurologic: Alert and oriented X 3, Moves all 4 extremities     I    Yolanda Aggarwal, [facility]  Acute Care Pain Management Program  [facility] ([facility], [place], [place])  Monday-Friday 8a-4p   Page via online paging system  or call [phone]

## 2021-07-22 ENCOUNTER — APPOINTMENT (OUTPATIENT)
Dept: OCCUPATIONAL THERAPY | Facility: CLINIC | Age: 81
DRG: 638 | End: 2021-07-22
Attending: HOSPITALIST
Payer: MEDICARE

## 2021-07-22 ENCOUNTER — APPOINTMENT (OUTPATIENT)
Dept: PHYSICAL THERAPY | Facility: CLINIC | Age: 81
DRG: 638 | End: 2021-07-22
Payer: MEDICARE

## 2021-07-22 LAB
GLUCOSE BLDC GLUCOMTR-MCNC: 164 MG/DL (ref 70–125)
GLUCOSE BLDC GLUCOMTR-MCNC: 193 MG/DL (ref 70–125)
GLUCOSE BLDC GLUCOMTR-MCNC: 207 MG/DL (ref 70–125)
GLUCOSE BLDC GLUCOMTR-MCNC: 276 MG/DL (ref 70–125)
GLUCOSE BLDC GLUCOMTR-MCNC: 314 MG/DL (ref 70–125)

## 2021-07-22 PROCEDURE — 99233 SBSQ HOSP IP/OBS HIGH 50: CPT | Performed by: HOSPITALIST

## 2021-07-22 PROCEDURE — 250N000013 HC RX MED GY IP 250 OP 250 PS 637: Performed by: HOSPITALIST

## 2021-07-22 PROCEDURE — 250N000013 HC RX MED GY IP 250 OP 250 PS 637: Performed by: NURSE PRACTITIONER

## 2021-07-22 PROCEDURE — 120N000001 HC R&B MED SURG/OB

## 2021-07-22 PROCEDURE — 250N000012 HC RX MED GY IP 250 OP 636 PS 637: Performed by: HOSPITALIST

## 2021-07-22 PROCEDURE — 250N000011 HC RX IP 250 OP 636: Performed by: HOSPITALIST

## 2021-07-22 PROCEDURE — 97116 GAIT TRAINING THERAPY: CPT | Mod: GP

## 2021-07-22 PROCEDURE — 97166 OT EVAL MOD COMPLEX 45 MIN: CPT | Mod: GO

## 2021-07-22 PROCEDURE — 99232 SBSQ HOSP IP/OBS MODERATE 35: CPT | Performed by: NURSE PRACTITIONER

## 2021-07-22 PROCEDURE — 99222 1ST HOSP IP/OBS MODERATE 55: CPT | Performed by: NURSE PRACTITIONER

## 2021-07-22 PROCEDURE — 97129 THER IVNTJ 1ST 15 MIN: CPT | Mod: GO

## 2021-07-22 RX ORDER — HYDROMORPHONE HYDROCHLORIDE 1 MG/ML
0.3 INJECTION, SOLUTION INTRAMUSCULAR; INTRAVENOUS; SUBCUTANEOUS
Status: DISCONTINUED | OUTPATIENT
Start: 2021-07-22 | End: 2021-07-23

## 2021-07-22 RX ORDER — DEXTROSE MONOHYDRATE 25 G/50ML
25-50 INJECTION, SOLUTION INTRAVENOUS
Status: DISCONTINUED | OUTPATIENT
Start: 2021-07-22 | End: 2021-07-26 | Stop reason: HOSPADM

## 2021-07-22 RX ORDER — HYDROMORPHONE HYDROCHLORIDE 5 MG/5ML
0.3 SOLUTION ORAL EVERY 4 HOURS PRN
Status: DISCONTINUED | OUTPATIENT
Start: 2021-07-22 | End: 2021-07-22

## 2021-07-22 RX ORDER — GABAPENTIN 100 MG/1
200 CAPSULE ORAL 2 TIMES DAILY
Status: DISCONTINUED | OUTPATIENT
Start: 2021-07-22 | End: 2021-07-26 | Stop reason: HOSPADM

## 2021-07-22 RX ORDER — GABAPENTIN 100 MG/1
100 CAPSULE ORAL ONCE
Status: COMPLETED | OUTPATIENT
Start: 2021-07-22 | End: 2021-07-22

## 2021-07-22 RX ORDER — HYDROXYZINE HYDROCHLORIDE 25 MG/1
25 TABLET, FILM COATED ORAL EVERY 6 HOURS PRN
Status: DISCONTINUED | OUTPATIENT
Start: 2021-07-22 | End: 2021-07-26 | Stop reason: HOSPADM

## 2021-07-22 RX ORDER — LAMOTRIGINE 100 MG/1
100 TABLET ORAL DAILY
Status: DISCONTINUED | OUTPATIENT
Start: 2021-07-23 | End: 2021-07-26 | Stop reason: HOSPADM

## 2021-07-22 RX ORDER — GLIPIZIDE 10 MG/1
20 TABLET ORAL
Status: DISCONTINUED | OUTPATIENT
Start: 2021-07-22 | End: 2021-07-26 | Stop reason: HOSPADM

## 2021-07-22 RX ORDER — GABAPENTIN 100 MG/1
100 CAPSULE ORAL 3 TIMES DAILY
Status: DISCONTINUED | OUTPATIENT
Start: 2021-07-22 | End: 2021-07-22

## 2021-07-22 RX ORDER — AMLODIPINE BESYLATE 2.5 MG/1
2.5 TABLET ORAL DAILY
Status: DISCONTINUED | OUTPATIENT
Start: 2021-07-22 | End: 2021-07-23

## 2021-07-22 RX ORDER — NICOTINE POLACRILEX 4 MG
15-30 LOZENGE BUCCAL
Status: DISCONTINUED | OUTPATIENT
Start: 2021-07-22 | End: 2021-07-26 | Stop reason: HOSPADM

## 2021-07-22 RX ORDER — LAMOTRIGINE 150 MG/1
150 TABLET ORAL DAILY
Status: DISCONTINUED | OUTPATIENT
Start: 2021-07-22 | End: 2021-07-26 | Stop reason: HOSPADM

## 2021-07-22 RX ADMIN — OXYBUTYNIN CHLORIDE 10 MG: 5 TABLET, FILM COATED, EXTENDED RELEASE ORAL at 21:18

## 2021-07-22 RX ADMIN — HYDROMORPHONE HYDROCHLORIDE 0.3 MG: 1 INJECTION, SOLUTION INTRAMUSCULAR; INTRAVENOUS; SUBCUTANEOUS at 08:55

## 2021-07-22 RX ADMIN — AMLODIPINE BESYLATE 2.5 MG: 2.5 TABLET ORAL at 03:57

## 2021-07-22 RX ADMIN — PREDNISONE 6 MG: 1 TABLET ORAL at 09:01

## 2021-07-22 RX ADMIN — GABAPENTIN 100 MG: 100 CAPSULE ORAL at 10:17

## 2021-07-22 RX ADMIN — DIAZEPAM 2 MG: 2 TABLET ORAL at 02:58

## 2021-07-22 RX ADMIN — ACETAMINOPHEN 975 MG: 325 TABLET ORAL at 21:17

## 2021-07-22 RX ADMIN — EMPAGLIFLOZIN 25 MG: 25 TABLET, FILM COATED ORAL at 10:18

## 2021-07-22 RX ADMIN — HYDROXYZINE HYDROCHLORIDE 25 MG: 25 TABLET, FILM COATED ORAL at 10:18

## 2021-07-22 RX ADMIN — ATORVASTATIN CALCIUM 10 MG: 10 TABLET, FILM COATED ORAL at 21:18

## 2021-07-22 RX ADMIN — HYDROXYZINE HYDROCHLORIDE 25 MG: 25 TABLET, FILM COATED ORAL at 23:59

## 2021-07-22 RX ADMIN — GLIPIZIDE 20 MG: 10 TABLET ORAL at 17:12

## 2021-07-22 RX ADMIN — OXYCODONE HYDROCHLORIDE 5 MG: 5 TABLET ORAL at 23:59

## 2021-07-22 RX ADMIN — GABAPENTIN 200 MG: 100 CAPSULE ORAL at 21:18

## 2021-07-22 RX ADMIN — INSULIN ASPART 1 UNITS: 100 INJECTION, SOLUTION INTRAVENOUS; SUBCUTANEOUS at 09:10

## 2021-07-22 RX ADMIN — PANTOPRAZOLE SODIUM 40 MG: 20 TABLET, DELAYED RELEASE ORAL at 09:01

## 2021-07-22 RX ADMIN — ACETAMINOPHEN 975 MG: 325 TABLET ORAL at 13:19

## 2021-07-22 RX ADMIN — ACETAMINOPHEN 975 MG: 325 TABLET ORAL at 09:01

## 2021-07-22 RX ADMIN — AMLODIPINE BESYLATE 2.5 MG: 2.5 TABLET ORAL at 09:02

## 2021-07-22 RX ADMIN — GABAPENTIN 100 MG: 100 CAPSULE ORAL at 09:01

## 2021-07-22 RX ADMIN — LAMOTRIGINE 100 MG: 100 TABLET ORAL at 09:01

## 2021-07-22 RX ADMIN — OXYCODONE HYDROCHLORIDE 5 MG: 5 TABLET ORAL at 19:15

## 2021-07-22 RX ADMIN — INSULIN ASPART 4 UNITS: 100 INJECTION, SOLUTION INTRAVENOUS; SUBCUTANEOUS at 13:20

## 2021-07-22 RX ADMIN — OXYCODONE HYDROCHLORIDE 5 MG: 5 TABLET ORAL at 13:19

## 2021-07-22 RX ADMIN — DULOXETINE HYDROCHLORIDE 60 MG: 60 CAPSULE, DELAYED RELEASE PELLETS ORAL at 09:01

## 2021-07-22 RX ADMIN — OXYCODONE HYDROCHLORIDE 5 MG: 5 TABLET ORAL at 01:24

## 2021-07-22 RX ADMIN — LIDOCAINE 3 PATCH: 246 PATCH TOPICAL at 09:00

## 2021-07-22 RX ADMIN — HYDRALAZINE HYDROCHLORIDE 5 MG: 20 INJECTION INTRAMUSCULAR; INTRAVENOUS at 01:54

## 2021-07-22 RX ADMIN — HYDROXYZINE HYDROCHLORIDE 25 MG: 25 TABLET, FILM COATED ORAL at 17:12

## 2021-07-22 RX ADMIN — HYDRALAZINE HYDROCHLORIDE 5 MG: 20 INJECTION INTRAMUSCULAR; INTRAVENOUS at 21:22

## 2021-07-22 RX ADMIN — INSULIN ASPART 2 UNITS: 100 INJECTION, SOLUTION INTRAVENOUS; SUBCUTANEOUS at 17:12

## 2021-07-22 RX ADMIN — GLIPIZIDE 20 MG: 10 TABLET ORAL at 10:18

## 2021-07-22 RX ADMIN — LAMOTRIGINE 150 MG: 150 TABLET ORAL at 21:18

## 2021-07-22 NOTE — PROGRESS NOTES
[facility] Medicine PROGRESS NOTE      Identification/Summary:   Pt seen, examined. Admitted earlier this morning.      Presented with hyperglycemia, sugars over 400, possibly over 500. Recently had kenalog injections in rheum clinic. Also chronically on prednisone. She also says that she recently stopped metformin due to diarrhea. Says that she has been extremely thirsty, says she would drink the whole river if she could.     Hospital stay has been complicated by severe low back pain, radiating down right leg and worsening urinary incontinence. Trying to increase pain meds, starting insulin. MR lumbar spine with severe spinal stenosis. Asking for spine consult to see if other interventions would be in order.     Vitals Ok this morning. Labs yesterday ok. WBC still slightly elevated but better. Likely related to steroids. Glucose good this morning. Glucose was elevated last night. Saw DM ed, really seems like a poor insulin candidate. Seen by spine last night, added gabapentin, poor surgical candidate, considering decadron but would challenging given poor glucose control.     Consultants:  Pain team  Spine surgery  Psychiatry  PT  OT    Assessment and Plan:  Hyperglycemia, poorly controlled DM2  Likely related to recent steroid injections, prednisone  A1c over 10, sugars likely acutely worse but probably really high at baseline too  Tried insulin but pt seems unlikely to manage this independently  Trying to optimize oral options  Gave 20 units of NPH yesterday morning, despite this sugars still around 300  Appreciate DM ed help, really a challenging situation  Will try increased glipizide at higher dose, jardiance at home dose  Will follow sugars, treat with sliding scale throughout the day  Will try to avoid decadron if at all possible  Will try to really restrict carbs with meals     Confusion  Unclear etiology, could be meds, dehydration  Not really confused now, may have some mild underlying  dementia  Had elevated ammonia level, not encephalopathic now, no asterixis  No clear source of infection  MRI neg other than incidental meningioma  Not a drinker  Will ask for psychiatry eval  Will ask for OT cog eval     Fatty liver  Noted on US abdomen  Recommend outpt follow up     PAULINE  Prerenal due to dehydration, hyperglycemia  Creat normalized, tolerating oral intake, stop fluids     Acute on chronic pain, severe spinal stenosis  Describes worsening joint pains, hands, bilat shoulder, rt leg  Appreciate pain team help  Appreciate spine surgeon impressions  Will give one dose IV dilaudid this morning as she is in intense pain  Says that she is typically on percocet  Also really helped by salonpas patches recently  Seems she is rx'd mobic, lidocaine patch  Holding NSAIDs due to PAULINE  Ordered scheduled tylenol, increased frequency of oxycodone    Hearing loss, cochlear hydrops  Takes low dose prednisone chronically for this per pt.      Mental illness  Anxiety seems significant, son also concerned about this  Getting valium prn  Continue home cymbalta, lamictal  Will ask for psychiatry help  Adding scheduled gabapentin for pain, maybe it'll help anxiety?    Diet: Consistent Carb 45 grams CHO per Meal Diet  Fluids: none  Pain meds: as above  Therapy:will ask for pt and ot consults, wonder if she would benefit from TCU or increase care at home  DVT Prophylaxis:  Low risk, ambulating in room, SCDs when resting  Code Status: Full Code  Disposition: stay today    Interval History/Subjective:  Complains of severe pain, all over today but also the rt leg. No chest pain, dyspnea, abdominal pain, nausea, vomiting. She thinks that urine incontinence is better.     Physical Exam/Objective:  Gen: alert, anxious, distressed, tearful, quite talkative, not at all sedated  ENT: no scleral icterus  Pulm: lungs are clear without wheezing, crackles  CV: regular rate and rhythm, no pitting edema  GI: abdomen is soft, non-tender,  non-distended with active bowel sounds.  Neuro: sensation and motion intact in the legs  Psych: anxious    Medications:     acetaminophen  975 mg Oral TID     amLODIPine  2.5 mg Oral Daily     atorvastatin  10 mg Oral At Bedtime     DULoxetine  60 mg Oral Daily     empagliflozin  25 mg Oral Daily     gabapentin  100 mg Oral TID     glipiZIDE  20 mg Oral BID AC     insulin aspart  1-7 Units Subcutaneous TID AC     [Held by provider] insulin NPH  20 Units Subcutaneous QAM AC     lamoTRIgine  100 mg Oral BID     lidocaine  3 patch Transdermal Q24H     lidocaine   Transdermal Q8H     lidocaine   Transdermal Daily     oxybutynin ER  10 mg Oral At Bedtime     pantoprazole  40 mg Oral QAM AC     predniSONE  6 mg Oral Daily     sodium chloride (PF)  3 mL Intracatheter Q8H     [Held by provider] triamterene-HCTZ  1 capsule Oral Daily       David Costello DO   Hospitalist  [facility]

## 2021-07-22 NOTE — PROGRESS NOTES
[facility] ACUTE PAIN SERVICE    Daily PAIN Progress Note    Assessment/Plan:  Tonya Yang is a 80 year old female who was admitted on 7/19/2021.  Pain team was asked to see the patient for acute on chronic pain, worsening joint pain. Admitted for hyperglycemia. History of DM, PAULINE, chronic low back pain radiating into RLE, hearing loss, anxiety, asthma, COPD, DM, GERD, hypertension, hyperlipidemia, neuropathy.  Describes pain as 10/10 at times, usually chronic pain 7-9/10 in low back and tingling shooting pain in RLE. Diet is consistent carb diet. The patient does no longer smoke and denies chemical dependency history.      Opioid Induced Respiratory Depression Risk Assessment:?high (age, renal, COPD, )     In the past 24 hours, patient has utilized 10mg of oxycodone and 0.3mg of IV Dilaudid for an MME of 22.     PLAN:   1) Pain is consistent with chronic pain, with exacerbation.The patient's home MME was 30 mg daily.  MR lumbar spine with severe spinal stenosis, multilevel lumbar spondylosis. Ortho Consulted - poor surgical candidate, observe and see if her symptoms have stabilized, adding gabapentin,  consider Lyrica if this is not tolerated, consider decadron   2)Multimodal Medication Therapy  Topical: icy hot q6hprn, lidocaine patches x3  NSAID'S: CrCl 58.6ml/min.  None, on prednisone daily   Muscle Relaxants:  None   Adjuvants: Tylenol 975 mg po tid ordered by Hospitalist, trial of gabapentin 200 mg q hs: changed to gabapentin 100mg TID - will increase to 200 mg bid - if tolerated consider 300 mg bid tomorrow or Friday, Atarax 25mg qhsprn - increase to q6h prn for pain, anxiety, sleep, itching  Antidepressants/anxiolytics: Cymbalta 60 mg daily, valium prn - recommend discontinue and would not recommend with concurrent use of opioids - not on  in the past year. Patient reports taking valium prn at home - reports uses every 2- months, reports does not take daily.   Opioids: oxycodone 5 mg po q 4 h  "prn  IV Pain medication: discontinue Iv Dilaudid, do not recommend for chronic back pain  3)Non-medication interventions: ice, heat   Acupuncture consult, Integrative consult - if patient agreeable   4)Constipation Prophylaxis: prn senna, miralax, supp   5) Care Teams: [facility] [place]     -Opioid prescriber has been [place] PMP pulled from system on 7/20/21. This indicates chronic Percocet use  7/12 Percocet 5/325mg 120 for 30 d  6/10 Percocet 120 for 30  5/6 same  4/6 same  3/4 same  Discharge Recommendations - We recommend prescribing the following at the time of discharge: No opioids.  Follow up PCP.    Subjective:  Describes pain as 10/10 at times, usually chronic pain 7-9/10 in low back. Reports tingling shooting pain in RLE. RLE pain described as shooting, burning, twisting. RLE pain started 2-3 weeks ago and patient reports is the most bothersome for her. She reports nothing seems to help with the RLE pain. Denies N/V/C/D, chest pain, sob.     Active Problems:    Confusion    Hyperglycemia    Acute renal failure, unspecified acute renal failure type (H)      Objective:  Vital signs in last 24 hours:  BP (!) 142/66 (BP Location: Left arm)   Pulse 76   Temp 98  F (36.7  C) (Oral)   Resp 18   Ht 1.651 m (5' 5\")   Wt 84.6 kg (186 lb 9.6 oz)   LMP  (LMP Unknown)   SpO2 99%   BMI 31.05 kg/m    Weight:   Vitals:    07/19/21 2305 07/20/21 0443   Weight: 83.5 kg (184 lb) 84.6 kg (186 lb 9.6 oz)      Weight change:   Body mass index is 31.05 kg/m .    Intake/Output last 3 shifts:  I/O last 3 completed shifts:  In: 600 [P.O.:600]  Out: -   Intake/Output this shift:  I/O this shift:  In: 375 [P.O.:375]  Out: -     Review of Systems:   As per subjective, all others negative.    Physical Exam:  General Appearance:  Alert, cooperative, no distress, appears stated age, up in chair    Head:  Normocephalic, without obvious abnormality, atraumatic   Eyes:  PERRL, conjunctiva/corneas clear, EOM's intact   Nose: Nares " normal, septum midline   Throat: Lips, mucosa, and tongue normal; teeth and gums normal   Neck: Supple, symmetrical, trachea midline   Back:   Symmetric, no curvature, ROM normal   Lungs:   Clear to auscultation bilaterally, respirations unlabored   Heart:  Regular rate and rhythm, S1, S2 normal    Abdomen:   Soft, non-tender, bowel sounds active all four quadrants    Extremities: Extremities normal, atraumatic   Skin: Skin warm, dry    Neurologic: Alert and oriented X 3, Moves all 4 extremities     Imaging: Reviewed      Labs: Reviewed   Recent Results (from the past 24 hour(s))   Glucose by meter    Collection Time: 07/21/21 12:16 PM   Result Value Ref Range    GLUCOSE BY METER POCT 301 (H) 70 - 125 mg/dL   Glucose by meter    Collection Time: 07/21/21  4:42 PM   Result Value Ref Range    GLUCOSE BY METER POCT 273 (H) 70 - 125 mg/dL   Glucose by meter    Collection Time: 07/21/21 10:26 PM   Result Value Ref Range    GLUCOSE BY METER POCT 285 (H) 70 - 125 mg/dL   Glucose by meter    Collection Time: 07/22/21  6:13 AM   Result Value Ref Range    GLUCOSE BY METER POCT 164 (H) 70 - 125 mg/dL       Total time spent 25 minutes with greater than 50% in consultation, education and coordination of care, examination of patient, review of medical record, lab and imaging results, completion of documentation, and discussion with RN, MD, Senior Acupuncturist, and pharmacist.      Venita ROMANO, FNP-C  Acute Care Pain Management Program  [facility] ([facility], [place], [place])  Monday-Friday 8a-4p   Page via online paging system  or call [phone]

## 2021-07-22 NOTE — PROGRESS NOTES
SPIRITUAL HEALTH SERVICES Progress Note     I met with Tonya and her son Davie this afternoon along with someone to type my words so that Tonya could read them during our conversation. We sat together for a while and talked about how she has at times felt a lot of pain which affects her emotionally. She said her pain is well controlled today which has been helpful for her emotionally today. She shared that her family of origin is Spiritism and she identifies with that along with feeling a connection with [place], however she is not part of a Orthodox at this time. She has had some very powerful spiritual experiences in her life that have brought her hope in her life and during low times now she thinks back to those experiences.      She also brought up that she has recently been dreaming about her father who  in the . She asked if that is normal, which I validated and said that even long after a loss we can still grieve loved ones. At this she began to cry a little and asked that we change the subject, which I did.      I let her know that chaplains would be available as needed during this hospitalization.       [facility]  [facility]

## 2021-07-22 NOTE — PROGRESS NOTES
07/22/21 1515   Quick Adds   Type of Visit Initial Occupational Therapy Evaluation       Present yes  ( w/ ipad and device)   Living Environment   People in home alone   Current Living Arrangements apartment   Home Accessibility no concerns  (elevator)   Living Environment Comments walk in shower, shower chair,    Self-Care   Equipment Currently Used at Home cane, straight  (outside)   Activity/Exercise/Self-Care Comment stated able to dress and bathe self, able to make light meals, A w/ laundry and cleaning needed, does not drive   Disability/Function   Hearing Difficulty or Deaf yes   Patient's preferred means of communication   (w/ ipad and device)   General Information   Onset of Illness/Injury or Date of Surgery 07/19/21   Patient/Family Therapy Goal Statement (OT) home with assist   Additional Occupational Profile Info/Pertinent History of Current Problem moderate   Existing Precautions/Restrictions fall   Limitations/Impairments hearing   Cognitive Status Examination   Follows Commands follows one-step commands;repetition of directions required  (may be d/t hearing impairment, distractible)   Cognitive Status Comments SLUMS 20/30, most likely mild impairment   Visual Perception   Visual Impairment/Limitations corrective lenses for reading   Sensory   Sensory Quick Adds   (tingling LE's)   Pain Assessment   Patient Currently in Pain Yes, see Vital Sign flowsheet   Range of Motion Comprehensive   General Range of Motion no range of motion deficits identified   Strength Comprehensive (MMT)   General Manual Muscle Testing (MMT) Assessment no strength deficits identified   Coordination   Upper Extremity Coordination No deficits were identified   Bed Mobility   Comment (Bed Mobility) sup> sit indep   Transfers   Transfer Comments toilet-indep, , bed, chair w/ SBA/CGA w/ FWW   Balance   Balance Assessment standing balance: static   Standing Balance: Static fair  balance  (CGA)   Grooming Assessment   [place] Level (Grooming) independent   Comment (Grooming) hands, teeth at sink    Toileting   [place] Level (Toileting) independent   Comment (Toileting) mary care, clothing management   Clinical Impression   Criteria for Skilled Therapeutic Interventions Met (OT) yes   OT Diagnosis decreased ADLs   OT Problem List-Impairments impacting ADL cognition;pain;mobility   Assessment of Occupational Performance 1-3 Performance Deficits   Planned Therapy Interventions (OT) ADL retraining;cognition;transfer training   Clinical Decision Making Complexity (OT) moderate complexity   Therapy Frequency (OT) Daily   Predicted Duration of Therapy 7 days   Anticipated Equipment Needs Upon Discharge (OT)   (cont to assess)   OT Discharge Planning    OT Discharge Recommendation (DC Rec) home with home care occupational therapy  (daily checks, prison in future? )   OT Rationale for DC Rec assist with meals, laundrym cleaning, errands   Total Evaluation Time (Minutes)   Total Evaluation Time (Minutes) 10

## 2021-07-22 NOTE — PROVIDER NOTIFICATION
Notified provider (Dr. Uribe) regarding pt's high blood pressures (see flowsheets); BP continues to be high after prn 5 mg hydralazine injection given. Order received for dose of amlodipine.

## 2021-07-22 NOTE — PLAN OF CARE
Problem: Hypertension Acute  Goal: Blood Pressure Within Desired Range  Outcome: No Change     Problem: Diabetes Comorbidity  Goal: Blood Glucose Level Within Targeted Range  Outcome: No Change     Problem: Adult Inpatient Plan of Care  Goal: Optimal Comfort and Wellbeing  Outcome: Improving     Cared for pt from [id number]. Pt neuros/CMS is unchanged during shift. Pt c/o RLE pain and generalized pain; pt given scheduled medications and prn oxycodone x1. Prn valium given for anxiety and lavender essential oils provided for relaxation and comfort. BP's elevated overnight (see flowsheets); provider notified and pt given prn hydralazine and dose of amlodipine (see provider notification note). Pt ambulating to bathroom with assist x1, walker, and gait belt. New PIV placed overnight. Call light within reach and bed alarm on for safety.

## 2021-07-22 NOTE — PROGRESS NOTES
"Orthopedic Progress Note      Assessment:    Right leg pain    Plan:   - Continue PT/OT  - Weightbearing status: WBAT  - Pain management  - Spoke with patient and son today. Appears that she is improving. She states that the icyhot as well as the salonpas are helpful for her. We also discussed that she has started gabapentin which is a nerve medication and should be helpful as well. Due to the fact that she seems comfortable during my visit and responding well to the current treatments, I would hold off on the IV steroids for now. Patient should follow up with Dr. Velásquez following discharge for ongoing management. Patient and son are both in agreement of this plan. Ortho will sign off. Please reach out with any new questions or concerns.       Subjective:  Pain: moderate  Nausea, Vomiting:  No  Lightheadedness, Dizziness:  No  Neuro:  Patient denies new onset numbness or paresthesias    Patient is feeling better today. States the creams are helping her with her pain.     Objective:  BP (!) 142/66 (BP Location: Left arm)   Pulse 76   Temp 98  F (36.7  C) (Oral)   Resp 18   Ht 1.651 m (5' 5\")   Wt 84.6 kg (186 lb 9.6 oz)   LMP  (LMP Unknown)   SpO2 99%   BMI 31.05 kg/m    The patient is A&Ox3. Appears comfortable.   Sensation is intact.  5/5 BLE motor strength.  Dorsalis pedis pulse intact.  Calves are soft and non-tender. Negative Nathanael's.  The incision is covered. Dressing C/D/I.      Pertinent Labs   Lab Results: personally reviewed.   Lab Results   Component Value Date    INR 0.9 09/06/2016    INR 2.6 07/26/2016    INR 2.1 06/14/2016    INR 2.1 06/14/2016     Lab Results   Component Value Date    WBC 15.3 (H) 07/21/2021    HGB 11.1 (L) 07/21/2021    HCT 37.6 07/21/2021    MCV 85 07/21/2021     07/21/2021     Lab Results   Component Value Date     07/21/2021    CO2 27 07/21/2021         Report completed by:  Suni Marie PA-C, MAHESH  Date: 7/22/2021  Time: 3:44 PM    "

## 2021-07-22 NOTE — PROGRESS NOTES
Diabetes Care Brief Note:    Reviewed chart. Given pt likely not good candidate for self administering insulin, MD restarted orals with increased Glipizide dose. Also changed diet to 45g carbohydrate.    I did attempt to meet with pt and review diet recommendations but she declined visit from the door, asked I talk to her nurse. Spoke with nurse who reported above changes made by MD. Decided to not see pt today and give her time to rest as she has a lot going on w/ pain/anxiety etc.    Gaby Salazar RD, LD, [facility]  Pager: [phone]

## 2021-07-22 NOTE — CONSULTS
CC: Right leg pain, rule out cauda equina    HPI: Ms Yang is a pleasant 80 year old female that presents with right leg pain and a longstanding history of spinal stenosis.  The reason for consultation is primarily the concern regarding loss of her bowel and bladder.  She is a very poor historian.  Her son and daughter in law were available at the time of examination.  Per her report, she has a long history of buttock pain and leg pain and has been seeing a pain clinic.  She has previously been offered surgery but declined due to her poor response to surgeries in the past, especially noting her history of infection.  She reports a history of bowel incontinence that has resolved months ago after discontinuing Metformin.  She has a history of urinary incontinence that has previously resolved with Oxybutynin.  Her bladder has been more problematic for her recently but she states she feels the need to go to the bathroom but cant always wait long enough for the nurses to help her.  She has previously had numbness in her legs but has no numbness or weakness at this time.  She reports she can ambulate with the assistance of a walker.    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     Hepatic abscess      Hyperlipidemia      Hypertension      Irritable bowel syndrome      Meniere disease      Neuropathy      Noninfectious ileitis      Peritoneal abscess (H)      Spinal stenosis of lumbar region      Steroid long-term use      Vaginitis, atrophic, postmenopausal      Vitamin D deficiency      Past Surgical History:   Procedure Laterality Date     CATARACT  IOL, RT/LT Left 7/2013     FOOT SURGERY      toe     GI SURGERY       LAPAROSCOPIC HEPATECTOMY PARTIAL  11/4/2013    Procedure: LAPAROSCOPIC HEPATECTOMY PARTIAL;  Laparoscopic Debridement of Liver Abcess;  Surgeon: Sepideh Green MD;  Location: [facility] OR     ORTHOPEDIC SURGERY       PICC INSERTION  8/28/2013    5fr DL Power PICC, 41cm (1cm external length) in the R lateral brachial vein with tip in the SVC RA junction.     RECTAL SURGERY      1970s     VASCULAR SURGERY       Current Facility-Administered Medications   Medication     acetaminophen (TYLENOL) tablet 325 mg     acetaminophen (TYLENOL) tablet 975 mg     atorvastatin (LIPITOR) tablet 10 mg     bisacodyl (DULCOLAX) Suppository 10 mg     glucose gel 15-30 g    Or     dextrose 50 % injection 25-50 mL    Or     glucagon injection 1 mg     diazepam (VALIUM) tablet 2-4 mg     DULoxetine (CYMBALTA) DR capsule 60 mg     gabapentin (NEURONTIN) capsule 200 mg     hydrALAZINE (APRESOLINE) injection 5 mg     hydrOXYzine (ATARAX) tablet 25 mg     insulin NPH injection 20 Units     lamoTRIgine (LaMICtal) tablet 100 mg     Lidocaine (LIDOCARE) 4 % Patch 3 patch     lidocaine (LMX4) cream     lidocaine 1 % 0.1-1 mL     lidocaine patch in PLACE     lidocaine patch REMOVAL     melatonin tablet 1 mg     methyl salicylate-menthol (ICY HOT) ointment     naloxone (NARCAN) injection 0.2 mg    Or     naloxone (NARCAN) injection 0.4 mg    Or     naloxone (NARCAN) injection 0.2 mg    Or     naloxone (NARCAN) injection 0.4 mg     oxybutynin ER (DITROPAN-XL) 24 hr tablet 10 mg     oxyCODONE (ROXICODONE) tablet 5 mg     pantoprazole (PROTONIX) EC tablet 40 mg     polyethylene glycol (MIRALAX) Packet 17 g     predniSONE (DELTASONE) tablet 6 mg     sennosides (SENOKOT) tablet 8.6 mg     sodium chloride (PF) 0.9% PF flush 3 mL     sodium chloride (PF) 0.9% PF flush 3 mL     [Held by provider] triamterene-HCTZ (DYAZIDE) 37.5-25 MG per capsule 1 capsule        Allergies    Allergen Reactions     Propofol Nausea and Vomiting     Shellfish Allergy      Other reaction(s): Dizziness     Family History   Problem Relation Age of Onset     Cerebrovascular Disease Mother      Heart Disease Mother      Heart Disease Father      Heart Disease Brother      Diabetes No family hx of      Coronary Artery Disease No family hx of      Hypertension No family hx of      Hyperlipidemia No family hx of      Breast Cancer No family hx of      Colon Cancer No family hx of      Prostate Cancer No family hx of      Other Cancer No family hx of      Depression No family hx of      Anxiety Disorder No family hx of      Mental Illness No family hx of      Substance Abuse No family hx of      Anesthesia Reaction No family hx of      Asthma No family hx of      Osteoporosis No family hx of      Genetic Disorder No family hx of      Thyroid Disease No family hx of      Obesity No family hx of      Unknown/Adopted No family hx of      Social History     Socioeconomic History     Marital status:      Spouse name: Not on file     Number of children: Not on file     Years of education: Not on file     Highest education level: Not on file   Occupational History     Not on file   Tobacco Use     Smoking status: Former Smoker     Types: Cigarettes     Quit date: 11/15/1987     Years since quittin.7     Smokeless tobacco: Former User   Substance and Sexual Activity     Alcohol use: Not Currently     Alcohol/week: 0.0 standard drinks     Comment: MALISSA white since      Drug use: No     Comment: used marijuanna in the past     Sexual activity: Never     Partners: Male   Other Topics Concern     Parent/sibling w/ CABG, MI or angioplasty before 65F 55M? No   Social History Narrative     Not on file     Social Determinants of Health     Financial Resource Strain:      Difficulty of Paying Living Expenses:    Food Insecurity:      Worried About Running Out of Food in the Last Year:      Ran Out of Food in  "the Last Year:    Transportation Needs:      Lack of Transportation (Medical):      Lack of Transportation (Non-Medical):    Physical Activity:      Days of Exercise per Week:      Minutes of Exercise per Session:    Stress:      Feeling of Stress :    Social Connections:      Frequency of Communication with Friends and Family:      Frequency of Social Gatherings with Friends and Family:      Attends Gnosticist Services:      Active Member of Clubs or Organizations:      Attends Club or Organization Meetings:      Marital Status:    Intimate Partner Violence:      Fear of Current or Ex-Partner:      Emotionally Abused:      Physically Abused:      Sexually Abused:       ROS: 10 point ROS neg other than the symptoms noted above in the HPI.    Physical Exam:  BP (!) 168/74 (BP Location: Right arm)   Pulse 80   Temp 97.9  F (36.6  C) (Oral)   Resp 18   Ht 1.651 m (5' 5\")   Wt 84.6 kg (186 lb 9.6 oz)   LMP  (LMP Unknown)   SpO2 96%   BMI 31.05 kg/m    General: Alert and appropriate.  She is scattered in her responses but able to follow a conversation and is reasonable.  Spine: Non-tender.  No erythema or bruising  Bilateral lower extremities: 5/5 strength in hip flexors, quadriceps, hamstrings, gastroc-soleus, tib anterior, and ehl.  Normal sensation in L2-S1 distribution.    Imaging:  MRI of lumbar spine  IMPRESSION:  1.  Multilevel lumbar spondylosis.  2.  Severe spinal canal stenosis at L4-L5. Moderate spinal canal stenosis at L3-L4.  3.  Severe neural foraminal stenosis bilaterally at L5-S1 with spondylolisthesis    Assessment: Ms Yang is a 80 year old female with severe but not critical stenosis at L4-5 with an L5 radiculopathy.  She has a long standing history of bowel and bladder issues that wax and wane with medications.  This does not seem to be a picture of cauda equina syndrome but this cannot be completely ruled out.    Recommendations:  After an extensive discussion with the patient and her family, " we all came to an understanding of the concerns of cauda equina.  We are going to treat this as severe spinal stenosis with at most but still not likely sub acute cauda equina syndrome.  And given the fact that she is a very poor surgical candidate, we are going to observe and see if her symptoms have stabilized or even improved (as she has recently suggested).  I agree with adding gabapentin.  We could consider Lyrica if this is not tolerated.  We could consider doing a 24 hour decadron trial with 4 mg of decadron every 6 hours for 4 doses.  We should be cautious with this for her blood sugar has been out of control.  We will have our ortho PA evaluate her again tomorrow to see if there are any significant changes.

## 2021-07-22 NOTE — CONSULTS
INITIAL PSYCHIATRIC CONSULTATION  This note was created with the help of Dragon dictation system. Grammatical and typing errors are not intentional.            REASON FOR REQUEST: bipolar, anxiety, pain;      ASSESSMENT/RECOMMENDATIONS/PLAN :     Adjustment disorder and acute reaction with anxiety in the setting of pain and stress of hospitalization.  Bipolar disorder 1 by history.   Reactive depression in the setting of pain and stressful environment as a result of hospitalization due to medical conditions.    Recommendations:  Lamictal 100 mg QAM. And Lamictal 150 mg at bedtime.  Valium 0.5 mg 3 times a day: For anxiety, muscle relaxer  Patient would benefit from home care services to include but not limited to nursing, PT, OT, social service, referral to mental health services for psychotherapy if needed and diabetic educator.  Patient does not wish to have surgery however wants her pain managed and primarily wants home health services because reimbursed to her son.  Patient does not wish to be started on any psychotropics, reporting of pain affecting her functionality and correctly identifying her sadness and depression to situations and stressors.  Case discussed with  care managers in regards to assisting Mrs. Yang with home health services and other necessary tools that she would require to stay at home.    Addendum:  No additional medications needed from psychiatric standpoint. I will sign off.       MENTAL STATUS EXAMINATION:   General Appearance: Not in acute distress, resting comfortably in bed.    Behavior: Good eye contact, no bizarre ideations  Speech: Coherent. Circumstantial.   Thought Process: Clear, perseverative on the cost of home health services..  Thought content: No evidence of hallucinations, delusions or paranoia.    Thought Formation: Associations are connected  Judgment: Fair  Insight : Intact  Attention : Adequate  Memory: Sufficient  Fund Of Knowledge: Average  Affect:  "Neutral  Mood: Congruent  Alert : Awake  Suicidal ideation: Absent  Homicidal ideation: Absent  Orientation: X 3  Comprehension: Sufficient pertaining to current medical needs  Generative thought content: Adequate.  Spontaneous conversation  Language: Intact  Gait and Ambulation: Gait and ambulation at baseline.    Musculoskeletal: No tonal abnormalities    BP (!) 142/66 (BP Location: Left arm)   Pulse 76   Temp 98  F (36.7  C) (Oral)   Resp 18   Ht 1.651 m (5' 5\")   Wt 84.6 kg (186 lb 9.6 oz)   LMP  (LMP Unknown)   SpO2 99%   BMI 31.05 kg/m           HISTORY OF PRESENT ILLNESS:   Presenting history to include: Per HMS/Specialists:   Tonya Yang says she was feeling off, dizzy.  She spent time with her son today who was concerned that her blood sugar was 460.  Later patient was concerned because she lives at home alone.  She checked her blood sugar which showed greater than 550.  She had never had a blood sugar this high before became anxious. Ménière's disease, chronically on prednisone.  More confused than normal.  Blood glucose usually less than 300, tonight greater than 500 at home.Endorse nausea without vomiting, denies chest pain, shortness of breathAll other systems reviewed and are negative   In the ED, Tonya Yang in the ED patient was afebrile, slightly hypertensive.  Mildly hyponatremic and found to have an PAULINE creatinine 1.7.  She had mildly elevated liver function and ammonia.  Because of her word finding difficulties MRI was ordered.  MRI brain cow showed no recent infarcts or hemorrhage, did identify a millimeter presumed meningioma on mild volume loss.     Upon assessment this afternoon, Mrs. Yang was noted to be resting in bed watching TV.  When I entered the room she directed me to lower my mask so she could talk to me.  She said that all \"the nurses are doing that so you do that to\".  I told Mrs. Yang that I would communicate with her by writing my questions that she can read and " "responded.  She agreed to that.  I wrote questions on a piece of paper one by one asking following questions: Any concerns from psychiatric standpoint?  She answered no  Any mood swings?  She answered no.  She rolled her eyes and said that she was not crazy.  Hopelessness, helplessness, suicidality?  She answered no.  Sleep difficulties?  She is sleeping well.  She talked at length about  from medical insurance company named [employer] and a  coming to her but not doing what a home health aide would do.  She kept asking if her son could reimbursed $30 an hour for 4 hours of home health services then she could increase the services to 2 or 3 days a week.  She did not want her son to experience financial stressors as \"he is not wealthy\".  Patient also talked about a service similar to [employer] \"they service good food because other clients have referred and recommended that\"  Patient denied any thoughts of self-harm or suicidality.  She did mention several times that \"I am 80 and I know I have pain and I know I have really bad the stenosis and it is getting worse because I have to do all these things and if I have home health aide coming to help me I will feel better\".  She denied any hallucinations, delusions or paranoia.  She dismissed any psychiatric concerns.  Throughout this assessment Mrs. Yang was anxious and concerned about having to pay for home health services.  Case was also discussed with Dr. Costello at length along with  care manager.  Medications reviewed to include but not limited to Lamictal 100 mg twice a day, gabapentin 200 mg twice a day and at home she was taking Valium 2 to 4 mg every 4-6 hours as needed.  I do believe patient would benefit from some medication to help with muscle relaxing and anxiety.  Being in hospital in a stressful environment she is anxious and nervous.  Review of Systems:As per HPI. Remainders of 12 point review of systems " "negative.  Psychiatric ROS:  Change of Interest/Anhedonia:  No  Appetite/Weight Changes: No  Concentration Changes:No  Impaired Energy:No  Impaired Sleep:No  Anxiety/Panic:No  Tearfulness:No  Depression:No  Psychosis: No  Irritability:No  SI/VI/HI: No,No,No  Samira: No            PFSH reviewed  and not pertinent to chief complaint/reason for visit  BP (!) 142/66 (BP Location: Left arm)   Pulse 76   Temp 98  F (36.7  C) (Oral)   Resp 18   Ht 1.651 m (5' 5\")   Wt 84.6 kg (186 lb 9.6 oz)   LMP  (LMP Unknown)   SpO2 99%   BMI 31.05 kg/m    No results found for: AMPHET, PCP, BARBIT, OXYCODONE, THC, ETOH  @24HOURRESULTS@  Recent Results (from the past 72 hour(s))   Glucose by meter    Collection Time: 07/19/21 11:04 PM   Result Value Ref Range    GLUCOSE BY METER POCT 481 (HH) 70 - 125 mg/dL   Basic metabolic panel    Collection Time: 07/19/21 11:22 PM   Result Value Ref Range    Sodium 133 (L) 136 - 145 mmol/L    Potassium 3.9 3.5 - 5.0 mmol/L    Chloride 93 (L) 98 - 107 mmol/L    Carbon Dioxide (CO2) 26 22 - 31 mmol/L    Anion Gap 14 5 - 18 mmol/L    Urea Nitrogen 38 (H) 8 - 28 mg/dL    Creatinine 1.76 (H) 0.60 - 1.10 mg/dL    Calcium 9.7 8.5 - 10.5 mg/dL    Glucose 522 (HH) 70 - 125 mg/dL    GFR Estimate 27 (L) >60 mL/min/1.73m2   Lipase    Collection Time: 07/19/21 11:22 PM   Result Value Ref Range    Lipase 53 (H) 0 - 52 U/L   Magnesium    Collection Time: 07/19/21 11:22 PM   Result Value Ref Range    Magnesium 2.1 1.8 - 2.6 mg/dL   Blood gas venous    Collection Time: 07/19/21 11:22 PM   Result Value Ref Range    pH Venous 7.38 7.35 - 7.45    pCO2 Venous 46 35 - 50 mm Hg    pO2 Venous 26 25 - 47 mm Hg    Bicarbonate Venous 25 24 - 30 mmol/L    Base Excess/Deficit (+/-) 2.4   mmol/L    Oxyhemoglobin Venous 36.0 (L) 70.0 - 75.0 %    O2 Sat, Venous 36.7 (L) 70.0 - 75.0 %   Hepatic function panel    Collection Time: 07/19/21 11:22 PM   Result Value Ref Range    Bilirubin Total 0.7 0.0 - 1.0 mg/dL    Bilirubin " Direct 0.2 <=0.5 mg/dL    Protein Total 7.8 6.0 - 8.0 g/dL    Albumin 4.2 3.5 - 5.0 g/dL    Alkaline Phosphatase 159 (H) 45 - 120 U/L    AST 28 0 - 40 U/L    ALT 52 (H) 0 - 45 U/L   Ketone Beta-Hydroxybutyrate Quantitative    Collection Time: 07/19/21 11:22 PM   Result Value Ref Range    Ketone (Beta-Hydroxybutyrate) Quantitative 0.30 <=0.3 mmol/L   Troponin I    Collection Time: 07/19/21 11:22 PM   Result Value Ref Range    Troponin I 0.02 0.00 - 0.29 ng/mL   Ammonia    Collection Time: 07/19/21 11:22 PM   Result Value Ref Range    Ammonia 57 (H) 11 - 35 umol/L   CBC with platelets and differential    Collection Time: 07/19/21 11:22 PM   Result Value Ref Range    WBC Count 17.3 (H) 4.0 - 11.0 10e3/uL    RBC Count 5.05 3.80 - 5.20 10e6/uL    Hemoglobin 12.5 11.7 - 15.7 g/dL    Hematocrit 42.5 35.0 - 47.0 %    MCV 84 78 - 100 fL    MCH 24.8 (L) 26.5 - 33.0 pg    MCHC 29.4 (L) 31.5 - 36.5 g/dL    RDW 14.8 10.0 - 15.0 %    Platelet Count 465 (H) 150 - 450 10e3/uL    % Neutrophils 76 %    % Lymphocytes 17 %    % Monocytes 6 %    % Eosinophils 0 %    % Basophils 0 %    % Immature Granulocytes 1 %    NRBCs per 100 WBC 0 <1 /100    Absolute Neutrophils 13.3 (H) 1.6 - 8.3 10e3/uL    Absolute Lymphocytes 2.8 0.8 - 5.3 10e3/uL    Absolute Monocytes 1.0 0.0 - 1.3 10e3/uL    Absolute Eosinophils 0.0 0.0 - 0.7 10e3/uL    Absolute Basophils 0.0 0.0 - 0.2 10e3/uL    Absolute Immature Granulocytes 0.1 (H) <=0.0 10e3/uL    Absolute NRBCs 0.0 10e3/uL   Glucose by meter    Collection Time: 07/20/21  3:08 AM   Result Value Ref Range    GLUCOSE BY METER POCT 266 (H) 70 - 125 mg/dL   UA with Microscopic reflex to Culture    Collection Time: 07/20/21  3:26 AM    Specimen: Urinary Bladder; Urine   Result Value Ref Range    Color Urine Colorless Colorless, Straw, Light Yellow, Yellow    Appearance Urine Clear Clear    Glucose Urine >1000 (A) Negative mg/dL    Bilirubin Urine Negative Negative    Ketones Urine Negative Negative mg/dL     Specific Gravity Urine 1.030 1.001 - 1.030    Blood Urine Negative Negative    pH Urine 5.0 5.0 - 7.0    Protein Albumin Urine Negative Negative mg/dL    Urobilinogen Urine <2.0 <2.0 mg/dL    Nitrite Urine Negative Negative    Leukocyte Esterase Urine Negative Negative    Bacteria Urine Few (A) None Seen /HPF    RBC Urine 0 <=2 /HPF    WBC Urine 2 <=5 /HPF   Glucose by meter    Collection Time: 07/20/21  4:41 AM   Result Value Ref Range    GLUCOSE BY METER POCT 230 (H) 70 - 125 mg/dL   Hemoglobin A1c    Collection Time: 07/20/21  6:14 AM   Result Value Ref Range    Hemoglobin A1C 10.8 (H) 0.0 - 5.6 %   Lactic Acid STAT    Collection Time: 07/20/21  6:14 AM   Result Value Ref Range    Lactic Acid 1.7 0.7 - 2.0 mmol/L   Glucose by meter    Collection Time: 07/20/21  6:24 AM   Result Value Ref Range    GLUCOSE BY METER POCT 231 (H) 70 - 125 mg/dL   Glucose by meter    Collection Time: 07/20/21 10:25 AM   Result Value Ref Range    GLUCOSE BY METER POCT 209 (H) 70 - 125 mg/dL   Glucose by meter    Collection Time: 07/20/21  4:50 PM   Result Value Ref Range    GLUCOSE BY METER POCT 379 (H) 70 - 125 mg/dL   Glucose by meter    Collection Time: 07/20/21  8:56 PM   Result Value Ref Range    GLUCOSE BY METER POCT 301 (H) 70 - 125 mg/dL   Glucose by meter    Collection Time: 07/21/21  6:57 AM   Result Value Ref Range    GLUCOSE BY METER POCT 170 (H) 70 - 125 mg/dL   Basic metabolic panel    Collection Time: 07/21/21  8:54 AM   Result Value Ref Range    Sodium 143 136 - 145 mmol/L    Potassium 3.6 3.5 - 5.0 mmol/L    Chloride 107 98 - 107 mmol/L    Carbon Dioxide (CO2) 27 22 - 31 mmol/L    Anion Gap 9 5 - 18 mmol/L    Urea Nitrogen 22 8 - 28 mg/dL    Creatinine 0.85 0.60 - 1.10 mg/dL    Calcium 9.0 8.5 - 10.5 mg/dL    Glucose 197 (H) 70 - 125 mg/dL    GFR Estimate 66 >60 mL/min/1.73m2   CBC with platelets    Collection Time: 07/21/21  8:55 AM   Result Value Ref Range    WBC Count 15.3 (H) 4.0 - 11.0 10e3/uL    RBC Count 4.42  3.80 - 5.20 10e6/uL    Hemoglobin 11.1 (L) 11.7 - 15.7 g/dL    Hematocrit 37.6 35.0 - 47.0 %    MCV 85 78 - 100 fL    MCH 25.1 (L) 26.5 - 33.0 pg    MCHC 29.5 (L) 31.5 - 36.5 g/dL    RDW 15.1 (H) 10.0 - 15.0 %    Platelet Count 353 150 - 450 10e3/uL   Glucose by meter    Collection Time: 07/21/21 12:16 PM   Result Value Ref Range    GLUCOSE BY METER POCT 301 (H) 70 - 125 mg/dL   Glucose by meter    Collection Time: 07/21/21  4:42 PM   Result Value Ref Range    GLUCOSE BY METER POCT 273 (H) 70 - 125 mg/dL   Glucose by meter    Collection Time: 07/21/21 10:26 PM   Result Value Ref Range    GLUCOSE BY METER POCT 285 (H) 70 - 125 mg/dL   Glucose by meter    Collection Time: 07/22/21  6:13 AM   Result Value Ref Range    GLUCOSE BY METER POCT 164 (H) 70 - 125 mg/dL       PMH:   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     Hepatic abscess      Hyperlipidemia      Hypertension      Irritable bowel syndrome      Meniere disease      Neuropathy      Noninfectious ileitis      Peritoneal abscess (H)      Spinal stenosis of lumbar region      Steroid long-term use      Vaginitis, atrophic, postmenopausal      Vitamin D deficiency            Current Medications:Scheduled Meds:    acetaminophen  975 mg Oral TID     amLODIPine  2.5 mg Oral Daily     atorvastatin  10 mg Oral At Bedtime     DULoxetine  60 mg Oral Daily     empagliflozin  25 mg Oral Daily     gabapentin  200 mg Oral BID     glipiZIDE  20 mg Oral BID AC     insulin aspart  1-7 Units Subcutaneous TID AC     [Held by provider] insulin NPH  20 Units Subcutaneous QAM AC     lamoTRIgine  100 mg Oral BID      lidocaine  3 patch Transdermal Q24H     lidocaine   Transdermal Q8H     lidocaine   Transdermal Daily     oxybutynin ER  10 mg Oral At Bedtime     pantoprazole  40 mg Oral QAM AC     predniSONE  6 mg Oral Daily     sodium chloride (PF)  3 mL Intracatheter Q8H     [Held by provider] triamterene-HCTZ  1 capsule Oral Daily     Continuous Infusions:  PRN Meds:.acetaminophen, bisacodyl, glucose **OR** dextrose **OR** glucagon, glucose **OR** dextrose **OR** glucagon, hydrALAZINE, HYDROmorphone, hydrOXYzine, lidocaine 4%, lidocaine (buffered or not buffered), melatonin, methyl salicylate-menthol, naloxone **OR** naloxone **OR** naloxone **OR** naloxone, oxyCODONE, polyethylene glycol, sennosides, sodium chloride (PF)                Family History: PERSONALLY REVIEWED.  Family History   Problem Relation Age of Onset     Cerebrovascular Disease Mother      Heart Disease Mother      Heart Disease Father      Heart Disease Brother      Diabetes No family hx of      Coronary Artery Disease No family hx of      Hypertension No family hx of      Hyperlipidemia No family hx of      Breast Cancer No family hx of      Colon Cancer No family hx of      Prostate Cancer No family hx of      Other Cancer No family hx of      Depression No family hx of      Anxiety Disorder No family hx of      Mental Illness No family hx of      Substance Abuse No family hx of      Anesthesia Reaction No family hx of      Asthma No family hx of      Osteoporosis No family hx of      Genetic Disorder No family hx of      Thyroid Disease No family hx of      Obesity No family hx of      Unknown/Adopted No family hx of      Pertinent Family hx not pertinent to Chief Complaint or reason for visit.     Social History:  PERSONALLY REVIEWED.  Social History     Socioeconomic History     Marital status:      Spouse name: Not on file     Number of children: Not on file     Years of education: Not on file     Highest education level: Not on file    Occupational History     Not on file   Tobacco Use     Smoking status: Former Smoker     Types: Cigarettes     Quit date: 11/15/1987     Years since quittin.7     Smokeless tobacco: Former User   Substance and Sexual Activity     Alcohol use: Not Currently     Alcohol/week: 0.0 standard drinks     Comment: MALISSA Ferreirapaco since      Drug use: No     Comment: used marijuanna in the past     Sexual activity: Never     Partners: Male   Other Topics Concern     Parent/sibling w/ CABG, MI or angioplasty before 65F 55M? No   Social History Narrative     Not on file     Social Determinants of Health     Financial Resource Strain:      Difficulty of Paying Living Expenses:    Food Insecurity:      Worried About Running Out of Food in the Last Year:      Ran Out of Food in the Last Year:    Transportation Needs:      Lack of Transportation (Medical):      Lack of Transportation (Non-Medical):    Physical Activity:      Days of Exercise per Week:      Minutes of Exercise per Session:    Stress:      Feeling of Stress :    Social Connections:      Frequency of Communication with Friends and Family:      Frequency of Social Gatherings with Friends and Family:      Attends [employer] Services:      Active Member of Clubs or Organizations:      Attends Club or Organization Meetings:      Marital Status:    Intimate Partner Violence:      Fear of Current or Ex-Partner:      Emotionally Abused:      Physically Abused:      Sexually Abused:     not pertinent to Chief Complaint or reason for visit.             Allergies as of 2014 Reviewed     Review of Systems:As per HPI. Remainders of 12 point review of systems negative.    Review of Pertinent Laboratory:      PERSONALLY REVIEWED.    Physical Exam: Temp:  [97.8  F (36.6  C)-98  F (36.7  C)] 98  F (36.7  C)  Pulse:  [76-86] 76  Resp:  [18-19] 18  BP: (142-212)/(66-88) 142/66  SpO2:  [96 %-99 %] 99 %   Vitals: reviewed in chart     Physical exam as per medical team:  reviewed in chart      diagnoses, risk and benefits of medications discussed with staff. Care coordination with care management team.   Thank you for this consultation.       Trish Vega; NP  [facility]        This note was created with the help of Dragon dictation system. Grammatical and typing errors are not intentional.

## 2021-07-23 ENCOUNTER — APPOINTMENT (OUTPATIENT)
Dept: PHYSICAL THERAPY | Facility: CLINIC | Age: 81
DRG: 638 | End: 2021-07-23
Payer: MEDICARE

## 2021-07-23 LAB
ANION GAP SERPL CALCULATED.3IONS-SCNC: 7 MMOL/L (ref 5–18)
BUN SERPL-MCNC: 22 MG/DL (ref 8–28)
CALCIUM SERPL-MCNC: 9.2 MG/DL (ref 8.5–10.5)
CHLORIDE BLD-SCNC: 110 MMOL/L (ref 98–107)
CO2 SERPL-SCNC: 27 MMOL/L (ref 22–31)
CREAT SERPL-MCNC: 0.81 MG/DL (ref 0.6–1.1)
ERYTHROCYTE [DISTWIDTH] IN BLOOD BY AUTOMATED COUNT: 15.5 % (ref 10–15)
GFR SERPL CREATININE-BSD FRML MDRD: 70 ML/MIN/1.73M2
GLUCOSE BLD-MCNC: 150 MG/DL (ref 70–125)
GLUCOSE BLDC GLUCOMTR-MCNC: 142 MG/DL (ref 70–125)
GLUCOSE BLDC GLUCOMTR-MCNC: 153 MG/DL (ref 70–125)
GLUCOSE BLDC GLUCOMTR-MCNC: 239 MG/DL (ref 70–125)
GLUCOSE BLDC GLUCOMTR-MCNC: 247 MG/DL (ref 70–125)
HCT VFR BLD AUTO: 37.7 % (ref 35–47)
HGB BLD-MCNC: 10.8 G/DL (ref 11.7–15.7)
HOLD SPECIMEN: NORMAL
MCH RBC QN AUTO: 24.5 PG (ref 26.5–33)
MCHC RBC AUTO-ENTMCNC: 28.6 G/DL (ref 31.5–36.5)
MCV RBC AUTO: 86 FL (ref 78–100)
PLATELET # BLD AUTO: 320 10E3/UL (ref 150–450)
POTASSIUM BLD-SCNC: 3.5 MMOL/L (ref 3.5–5)
RBC # BLD AUTO: 4.4 10E6/UL (ref 3.8–5.2)
SODIUM SERPL-SCNC: 144 MMOL/L (ref 136–145)
VIT B12 SERPL-MCNC: 1117 PG/ML (ref 213–816)
WBC # BLD AUTO: 12.1 10E3/UL (ref 4–11)

## 2021-07-23 PROCEDURE — 250N000012 HC RX MED GY IP 250 OP 636 PS 637: Performed by: HOSPITALIST

## 2021-07-23 PROCEDURE — 99233 SBSQ HOSP IP/OBS HIGH 50: CPT | Performed by: HOSPITALIST

## 2021-07-23 PROCEDURE — 250N000013 HC RX MED GY IP 250 OP 250 PS 637: Performed by: NURSE PRACTITIONER

## 2021-07-23 PROCEDURE — 80048 BASIC METABOLIC PNL TOTAL CA: CPT | Performed by: HOSPITALIST

## 2021-07-23 PROCEDURE — 120N000001 HC R&B MED SURG/OB

## 2021-07-23 PROCEDURE — 250N000011 HC RX IP 250 OP 636: Performed by: HOSPITALIST

## 2021-07-23 PROCEDURE — 82607 VITAMIN B-12: CPT | Performed by: HOSPITALIST

## 2021-07-23 PROCEDURE — 250N000013 HC RX MED GY IP 250 OP 250 PS 637: Performed by: HOSPITALIST

## 2021-07-23 PROCEDURE — 99232 SBSQ HOSP IP/OBS MODERATE 35: CPT | Performed by: NURSE PRACTITIONER

## 2021-07-23 PROCEDURE — 36415 COLL VENOUS BLD VENIPUNCTURE: CPT | Performed by: HOSPITALIST

## 2021-07-23 PROCEDURE — 97116 GAIT TRAINING THERAPY: CPT | Mod: GP

## 2021-07-23 PROCEDURE — 97530 THERAPEUTIC ACTIVITIES: CPT | Mod: GP

## 2021-07-23 PROCEDURE — 85014 HEMATOCRIT: CPT | Performed by: HOSPITALIST

## 2021-07-23 RX ORDER — AMLODIPINE BESYLATE 10 MG/1
10 TABLET ORAL DAILY
Status: DISCONTINUED | OUTPATIENT
Start: 2021-07-23 | End: 2021-07-26 | Stop reason: HOSPADM

## 2021-07-23 RX ORDER — PIOGLITAZONEHYDROCHLORIDE 15 MG/1
15 TABLET ORAL
Status: DISCONTINUED | OUTPATIENT
Start: 2021-07-23 | End: 2021-07-24

## 2021-07-23 RX ORDER — NYSTATIN 100000/ML
500000 SUSPENSION, ORAL (FINAL DOSE FORM) ORAL 4 TIMES DAILY
Status: DISCONTINUED | OUTPATIENT
Start: 2021-07-23 | End: 2021-07-26 | Stop reason: HOSPADM

## 2021-07-23 RX ORDER — LISINOPRIL 5 MG/1
5 TABLET ORAL DAILY
Status: DISCONTINUED | OUTPATIENT
Start: 2021-07-23 | End: 2021-07-23

## 2021-07-23 RX ADMIN — NYSTATIN 500000 UNITS: 500000 SUSPENSION ORAL at 20:54

## 2021-07-23 RX ADMIN — HYDROXYZINE HYDROCHLORIDE 25 MG: 25 TABLET, FILM COATED ORAL at 06:24

## 2021-07-23 RX ADMIN — PREDNISONE 6 MG: 1 TABLET ORAL at 08:18

## 2021-07-23 RX ADMIN — ATORVASTATIN CALCIUM 10 MG: 10 TABLET, FILM COATED ORAL at 20:52

## 2021-07-23 RX ADMIN — ACETAMINOPHEN 975 MG: 325 TABLET ORAL at 08:18

## 2021-07-23 RX ADMIN — ACETAMINOPHEN 975 MG: 325 TABLET ORAL at 14:58

## 2021-07-23 RX ADMIN — INSULIN ASPART 2 UNITS: 100 INJECTION, SOLUTION INTRAVENOUS; SUBCUTANEOUS at 12:38

## 2021-07-23 RX ADMIN — LAMOTRIGINE 100 MG: 100 TABLET ORAL at 08:17

## 2021-07-23 RX ADMIN — GLIPIZIDE 20 MG: 10 TABLET ORAL at 08:17

## 2021-07-23 RX ADMIN — INSULIN ASPART 3 UNITS: 100 INJECTION, SOLUTION INTRAVENOUS; SUBCUTANEOUS at 17:27

## 2021-07-23 RX ADMIN — NYSTATIN 500000 UNITS: 500000 SUSPENSION ORAL at 12:49

## 2021-07-23 RX ADMIN — PIOGLITAZONE 15 MG: 15 TABLET ORAL at 12:40

## 2021-07-23 RX ADMIN — EMPAGLIFLOZIN 25 MG: 25 TABLET, FILM COATED ORAL at 08:18

## 2021-07-23 RX ADMIN — PANTOPRAZOLE SODIUM 40 MG: 20 TABLET, DELAYED RELEASE ORAL at 06:12

## 2021-07-23 RX ADMIN — INSULIN ASPART 1 UNITS: 100 INJECTION, SOLUTION INTRAVENOUS; SUBCUTANEOUS at 06:14

## 2021-07-23 RX ADMIN — HYDROXYZINE HYDROCHLORIDE 25 MG: 25 TABLET, FILM COATED ORAL at 19:46

## 2021-07-23 RX ADMIN — GABAPENTIN 200 MG: 100 CAPSULE ORAL at 08:18

## 2021-07-23 RX ADMIN — NYSTATIN 500000 UNITS: 500000 SUSPENSION ORAL at 08:25

## 2021-07-23 RX ADMIN — GLIPIZIDE 20 MG: 10 TABLET ORAL at 17:27

## 2021-07-23 RX ADMIN — HYDRALAZINE HYDROCHLORIDE 5 MG: 20 INJECTION INTRAMUSCULAR; INTRAVENOUS at 05:33

## 2021-07-23 RX ADMIN — OXYBUTYNIN CHLORIDE 10 MG: 5 TABLET, FILM COATED, EXTENDED RELEASE ORAL at 20:54

## 2021-07-23 RX ADMIN — OXYCODONE HYDROCHLORIDE 5 MG: 5 TABLET ORAL at 22:44

## 2021-07-23 RX ADMIN — DULOXETINE HYDROCHLORIDE 60 MG: 60 CAPSULE, DELAYED RELEASE PELLETS ORAL at 08:18

## 2021-07-23 RX ADMIN — AMLODIPINE BESYLATE 10 MG: 10 TABLET ORAL at 08:18

## 2021-07-23 RX ADMIN — NYSTATIN 500000 UNITS: 500000 SUSPENSION ORAL at 17:27

## 2021-07-23 RX ADMIN — DIAZEPAM 0.5 MG: 5 SOLUTION ORAL at 20:52

## 2021-07-23 RX ADMIN — LIDOCAINE 3 PATCH: 246 PATCH TOPICAL at 08:25

## 2021-07-23 RX ADMIN — OXYCODONE HYDROCHLORIDE 5 MG: 5 TABLET ORAL at 11:06

## 2021-07-23 RX ADMIN — DIAZEPAM 0.5 MG: 5 SOLUTION ORAL at 15:05

## 2021-07-23 RX ADMIN — ACETAMINOPHEN 975 MG: 325 TABLET ORAL at 20:52

## 2021-07-23 RX ADMIN — OXYCODONE HYDROCHLORIDE 5 MG: 5 TABLET ORAL at 05:21

## 2021-07-23 RX ADMIN — LAMOTRIGINE 150 MG: 150 TABLET ORAL at 20:54

## 2021-07-23 RX ADMIN — GABAPENTIN 200 MG: 100 CAPSULE ORAL at 20:54

## 2021-07-23 NOTE — PLAN OF CARE
Problem: Adult Inpatient Plan of Care  Goal: Optimal Comfort and Wellbeing  Outcome: Improving     Problem: Hyperglycemia  Goal: Blood Glucose Level Within Targeted Range  Outcome: Improving   Patient's vitals stable on RA. Standby assist with walker with ADLs. Patient c/o general pain to legs, arms, and back. Pain controlled with scheduled tylenol, lidocain patches, prn oxycodone, and hydroxyzine. Sliding scale insulin used at meal times for blood sugar control. Patient is alert and oriented x4. Will continue to monitor for changes.

## 2021-07-23 NOTE — PROGRESS NOTES
[facility] Medicine PROGRESS NOTE      Identification/Summary:       Presented with hyperglycemia, sugars over 400, possibly over 500. Recently had kenalog injections in rheum clinic. Also chronically on prednisone. She also says that she recently stopped metformin due to diarrhea. Says that she has been extremely thirsty, says she would drink the whole river if she could.     Hospital stay has been complicated by severe low back pain, radiating down right leg and worsening urinary incontinence. Trying to increase pain meds, starting insulin. MR lumbar spine with severe spinal stenosis. Spine surgery reviewed.     Sugars very difficult to control, poor candidate for insulin as she lives alone and won't be able to give herself insulin according to her and her son. Trying to optimize oral DM meds. DM educator following.     Consultants:  Pain team  Spine surgery-signed off  Psychiatry  PT  OT    Assessment and Plan:  Hyperglycemia, poorly controlled DM2  Likely related to recent steroid injections, prednisone  A1c over 10, sugars likely acutely worse but probably really high at baseline too  Tried insulin but pt seems unlikely to manage this independently  Trying to optimize oral options, minimize dietary sugars  Appreciate DM ed help, really a challenging situation  trying increased glipizide dose, jardiance at home dose  Seems sugars peak in evening, will try actos at lunch time  Ideally would find oral regimen for glucose control to prevent dehydration, readmission     Confusion  Unclear etiology, may have been due to meds, dehydration  Not really confused now, may have some mild underlying dementia  Had elevated ammonia level, not encephalopathic now, no asterixis  No clear source of infection  Reported taking a lot of valium at home, not on  per pain team  Valium discontinued  May have been due to percocet  MRI neg other than incidental meningioma  Not a drinker  Appreciate therapy evals    Mouth pain,  burning  Possibly related to thrush, will start nystatin swish and spit  We will check a B12 level     Fatty liver  Noted on US abdomen  Recommend outpt follow up     PAULINE  Prerenal due to dehydration, hyperglycemia  Creat normalized, tolerating oral intake, stopped fluids     Acute on chronic pain, severe spinal stenosis  Describes worsening joint pains, hands, bilat shoulder, rt leg  Appreciate pain team help  Appreciate spine surgeon impressions  Avoiding IV dilaudid  Says that she is typically on percocet  Also really helped by salonpas patches recently, icy hot ordered here  Seems she is rx'd mobic, lidocaine patch  Holding NSAIDs due to PAULINE  Ordered scheduled tylenol, increased frequency of oxycodone  Added gabapentin, vistaril  Psych added low dose valium tid for anxiety and to help with muscle spasms    Hearing loss, cochlear hydrops  Takes low dose prednisone chronically for this per pt.      Mental illness  Anxiety seems significant, son also concerned about this  Reported taking valium at home but not on  per pain team  Appreciate psychiatry eval  Continue home cymbalta, lamictal evening dose increased  Added scheduled valium tid     Diet: Consistent Carb 45 grams CHO per Meal Diet  Fluids: none  Pain meds: as above  Therapy:will ask for pt and ot consults, wonder if she would benefit from TCU or increase care at home  DVT Prophylaxis:  Low risk, ambulating in room, SCDs when resting  Code Status: Full Code  Disposition: stay today    Interval History/Subjective:  Feeling a little better this morning.  Was able to sleep earlier.  Again complains of severe pain in the legs.  Complains of mouth pain.  No chest pain or shortness of breath.  No nausea or vomiting.    Physical Exam/Objective:  Gen: alert, calm, comfortable  ENT: no scleral icterus  Pulm: lungs are clear without wheezing, crackles  CV: regular rate and rhythm, no pitting edema  Neuro: Moving really well in bed, does not seem to have any  significant weakness  Psych: anxious    Medications:     acetaminophen  975 mg Oral TID     amLODIPine  10 mg Oral Daily     atorvastatin  10 mg Oral At Bedtime     diazepam  0.5 mg Oral TID     DULoxetine  60 mg Oral Daily     empagliflozin  25 mg Oral Daily     gabapentin  200 mg Oral BID     glipiZIDE  20 mg Oral BID AC     insulin aspart  1-7 Units Subcutaneous TID AC     lamoTRIgine  100 mg Oral Daily     lamoTRIgine  150 mg Oral Daily     lidocaine  3 patch Transdermal Q24H     lidocaine   Transdermal Q8H     lidocaine   Transdermal Daily     oxybutynin ER  10 mg Oral At Bedtime     pantoprazole  40 mg Oral QAM AC     predniSONE  6 mg Oral Daily     sodium chloride (PF)  3 mL Intracatheter Q8H     [Held by provider] triamterene-HCTZ  1 capsule Oral Daily       David Costello DO   Hospitalist  [facility]

## 2021-07-23 NOTE — PROGRESS NOTES
Care Management Follow Up    Length of Stay (days): 3    Expected Discharge Date:  7/24/2021     Concerns to be Addressed:       Patient plan of care discussed at interdisciplinary rounds: Yes    Anticipated Discharge Disposition: Home with Home Care      Referrals Placed by PATRICIA/RAJ:    Private pay costs discussed: Not applicable    Additional Information:  CM met with the Pt as needed with araceli. Pt confirms that she has a care giver ever who comes in to help with shopping and respite needs and  services. Pt states that she wants someone to clean her home add to her [facility] service plan. CM confirmed that Pt can requested this with her worker, Marylee, for continued care. Pt gave the CM permission to speak with [facility] as needed. CM had explained the different between [facility] services and medicare services. For Home care the Pt will need to be home bound during this time. Pt requested to think about and see how therapy goes.     PATRICIA called Diamond back with [employer] at [phone] who confirmed that Marylee Vang is the Pt's worker and the Pt has a current EW service plan for Aug 14, 2020 with a new assessment coming up at the end of the month. Diamond was able to confirm that the pt has 6hrs Homemaking services, Meals on Wheels, 10hrs of weekly Respite with [facility], [employer] [facility]. PATRICIA had requested for an RN to be added to the Pt's service plan on going. Diamond stated that she is able to connect with Marylee to add this on going as needed.     Pt to have home care with RN and PT ongoing at discharge pending therapy and home status needs.       CARISSA Hammond

## 2021-07-23 NOTE — PLAN OF CARE
Problem: Adult Inpatient Plan of Care  Goal: Absence of Hospital-Acquired Illness or Injury  Intervention: Identify and Manage Fall Risk  Recent Flowsheet Documentation  Taken 7/22/2021 2359 by Lexx Auguste RN  Safety Promotion/Fall Prevention:    assistive device/personal items within reach    bed alarm on    nonskid shoes/slippers when out of bed    room door open    room near nurse's station    safety round/check completed     Problem: Hyperglycemia  Goal: Blood Glucose Level Within Targeted Range  Outcome: Improving     Problem: Pain Acute  Goal: Acceptable Pain Control and Functional Ability  Intervention: Develop Pain Management Plan  Recent Flowsheet Documentation  Taken 7/22/2021 2359 by Lexx Auguste, RN  Pain Management Interventions:    medication (see MAR)    distraction    emotional support    rest     Problem: Hypertension Acute  Goal: Blood Pressure Within Desired Range  Outcome: Improving     Problem: Anxiety  Goal: Anxiety Reduction or Resolution  Outcome: Improving     Pt is alert and oriented x 4. C/o of 8/10  right leg pain and back pain. PRN oxy with hydroxyzine given at midnight and the pt has been sleeping since. The pt also c/o of lip, tongue and gum burning/pain. It started on Tuesday but its gotten increasingly worse today. Looked into her mouth and the tongue looked reddened, also the underside of the lip had white specs. Notified Dr. Uribe but the pt was sleeping x 2 when he came to assess. Will leave a sticky note for the day hospitalist to take a look.     0600- PRN oxy 5 mg and hydroxyzine given again for shooting leg pain that goes up the back.

## 2021-07-23 NOTE — DISCHARGE INSTRUCTIONS
DIABETES REMINDERS:  1) Check your blood sugars before meals + bedtime.  Always bring your blood sugar log and meter to your diabetes-related appointments.  2) Your blood sugar goals:  90-150mg/dL before eating  3) Always be prepared to treat a low blood sugar should it happen. Keep a sugar-containing beverage or food nearby.  4) When to call your clinic:     Blood sugar over 400 mg/dL.     If you have 2 to 3 low blood sugars (under 70mg/dL) in a row,     Low reading the same time of day several days in a row,    Blood sugars elevated and you can not get them down with your usual diabetes regimen,    You are ill and can't keep blood sugars controlled.   5) When to call 911:  If your blood glucose does not get better with treatment, or if you/someone else is unable to give you treatment.  6) Follow a 45g consistent carbohydrate diet. So 45g total carbohydrate per meal. Snacks 15g carbohydrate.

## 2021-07-23 NOTE — PLAN OF CARE
Problem: Hyperglycemia  Goal: Blood Glucose Level Within Targeted Range  Outcome: Improving     VSS except elevated SBP, down to 176 SBP. BG mid to low 200s. Continued pain in lips and tongue, improving with nystatin mouth wash 4 times a day. Plan to discharge home if BG continues to fall and discharge with RN and PT services at home per care management note.     Caitlin Luther RN

## 2021-07-23 NOTE — PROGRESS NOTES
[facility] ACUTE PAIN SERVICE    Daily PAIN Progress Note    Assessment/Plan:  Tonya Yang is a 80 year old female who was admitted on 7/19/2021.  Pain team was asked to see the patient for acute on chronic pain, worsening back pain with radiating RLE pain. Admitted for hyperglycemia. History of DM, PAULINE, chronic low back pain radiating into RLE, hearing loss, anxiety, depression, asthma, COPD, DM, GERD, hypertension, hyperlipidemia, neuropathy, bipolar.  Describes pain as 6-8/10. Usually chronic pain is rated 7-9/10 in low back. Reports intermittent tingling shooting pain in RLE that started 2-3 weeks ago. Diet is consistent carb diet. The patient does no longer smoke and denies chemical dependency history.      Opioid Induced Respiratory Depression Risk Assessment:?high (age, renal, COPD, concurrent use of benzos)     PLAN:   1) Pain is consistent with chronic pain, with exacerbation.The patient's home MME was 30 mg daily.  MR lumbar spine with severe spinal stenosis, multilevel lumbar spondylosis. Psych Consulted. Ortho Consulted - poor surgical candidate, observe and see if her symptoms have stabilized, adding gabapentin,  consider Lyrica if this is not tolerated, consider decadron   2)Multimodal Medication Therapy  Topical: icy hot q6hprn, lidocaine patches x3  NSAID'S: CrCl 58.6ml/min.  None, on prednisone daily   Muscle Relaxants:  None   Adjuvants: Tylenol 975 mg po tid ordered by Hospitalist, trial of gabapentin 200 mg q hs: changed to gabapentin 100mg TID - increased to 200 mg bid 7/22/2021, Atarax 25mg qhsprn (home med) increased to q6h prn for pain, anxiety, sleep, itching  Antidepressants/anxiolytics: Cymbalta 60 mg daily, valium per psych, Lamictal per psych  Opioids: oxycodone 5 mg po q 4 h prn  IV Pain medication: discontinue  3)Non-medication interventions: ice, heat   Acupuncture consult, Integrative consult - if patient agreeable   4)Constipation Prophylaxis: prn senna, miralax,  "supp   5) Care Teams: HMS, psych, Ortho     -Opioid prescriber has been [place] PMP pulled from system on 7/20/21. This indicates chronic Percocet use  7/12 Percocet 5/325mg 120 for 30 d  6/10 Percocet 120 for 30  5/6 same  4/6 same  3/4 same  Discharge Recommendations - We recommend prescribing the following at the time of discharge: No opioids - resume home percocet. Gabapentin 200 mg bid. Follow up PCP.    Subjective:  Describes pain as 6/10. Reports her chronic pain in back is usually 7-8/10 in low back. Reports intermittent tingling shooting pain in RLE. RLE pain described as shooting, burning, twisting. RLE pain started 2-3 weeks ago and patient reports is the most bothersome for her. Denies N/V/C/D, chest pain, sob.     Active Problems:    Confusion    Hyperglycemia    Acute renal failure, unspecified acute renal failure type (H)      Objective:  Vital signs in last 24 hours:  BP (!) 192/75 (BP Location: Left arm)   Pulse 73   Temp 98  F (36.7  C) (Oral)   Resp 18   Ht 1.651 m (5' 5\")   Wt 84.6 kg (186 lb 9.6 oz)   LMP  (LMP Unknown)   SpO2 96%   BMI 31.05 kg/m    Weight:     Vitals:    07/19/21 2305 07/20/21 0443   Weight: 83.5 kg (184 lb) 84.6 kg (186 lb 9.6 oz)      Weight change:   Body mass index is 31.05 kg/m .    Intake/Output last 3 shifts:  I/O last 3 completed shifts:  In: 975 [P.O.:975]  Out: -   Intake/Output this shift:  I/O this shift:  In: 240 [P.O.:240]  Out: -     Review of Systems:   As per subjective, all others negative.    Physical Exam:  General Appearance:  Alert, cooperative, no distress, appears stated age   Head:  Normocephalic, without obvious abnormality, atraumatic   Eyes:  PERRL, conjunctiva/corneas clear, EOM's intact   Nose: Nares normal, septum midline   Throat: Lips, mucosa, and tongue normal; teeth and gums normal   Neck: Supple, symmetrical, trachea midline   Back:   Symmetric, no curvature, ROM normal   Lungs:   Clear to auscultation bilaterally, " respirations unlabored   Heart:  Regular rate and rhythm, S1, S2 normal    Abdomen:   Soft, non-tender, bowel sounds active all four quadrants    Extremities: Extremities normal, atraumatic   Skin: Skin warm, dry    Neurologic: Alert and oriented X 3, Moves all 4 extremities     Imaging: Reviewed      Labs: Reviewed   Recent Results (from the past 24 hour(s))   Glucose by meter    Collection Time: 07/22/21 12:18 PM   Result Value Ref Range    GLUCOSE BY METER POCT 314 (H) 70 - 125 mg/dL   Glucose by meter    Collection Time: 07/22/21  4:52 PM   Result Value Ref Range    GLUCOSE BY METER POCT 193 (H) 70 - 125 mg/dL   Glucose by meter    Collection Time: 07/22/21  6:02 PM   Result Value Ref Range    GLUCOSE BY METER POCT 207 (H) 70 - 125 mg/dL   Glucose by meter    Collection Time: 07/22/21  9:15 PM   Result Value Ref Range    GLUCOSE BY METER POCT 276 (H) 70 - 125 mg/dL   Basic metabolic panel    Collection Time: 07/23/21  5:47 AM   Result Value Ref Range    Sodium 144 136 - 145 mmol/L    Potassium 3.5 3.5 - 5.0 mmol/L    Chloride 110 (H) 98 - 107 mmol/L    Carbon Dioxide (CO2) 27 22 - 31 mmol/L    Anion Gap 7 5 - 18 mmol/L    Urea Nitrogen 22 8 - 28 mg/dL    Creatinine 0.81 0.60 - 1.10 mg/dL    Calcium 9.2 8.5 - 10.5 mg/dL    Glucose 150 (H) 70 - 125 mg/dL    GFR Estimate 70 >60 mL/min/1.73m2   Extra Red Top Tube    Collection Time: 07/23/21  5:47 AM   Result Value Ref Range    Hold Specimen JI    CBC with platelets    Collection Time: 07/23/21  5:48 AM   Result Value Ref Range    WBC Count 12.1 (H) 4.0 - 11.0 10e3/uL    RBC Count 4.40 3.80 - 5.20 10e6/uL    Hemoglobin 10.8 (L) 11.7 - 15.7 g/dL    Hematocrit 37.7 35.0 - 47.0 %    MCV 86 78 - 100 fL    MCH 24.5 (L) 26.5 - 33.0 pg    MCHC 28.6 (L) 31.5 - 36.5 g/dL    RDW 15.5 (H) 10.0 - 15.0 %    Platelet Count 320 150 - 450 10e3/uL   Glucose by meter    Collection Time: 07/23/21  6:03 AM   Result Value Ref Range    GLUCOSE BY METER POCT 142 (H) 70 - 125 mg/dL        Total time spent 25 minutes with greater than 50% in consultation, education and coordination of care, examination of patient, review of medical record, lab and imaging results, completion of documentation, and discussion with RN, MD, Senior Acupuncturist, and pharmacist.      Venita ROMANO, TIERRAP-C  Acute Care Pain Management Program  [facility] ([facility], [place], [place])  Monday-Friday 8a-4p   Page via online paging system  or call [phone]

## 2021-07-24 ENCOUNTER — APPOINTMENT (OUTPATIENT)
Dept: OCCUPATIONAL THERAPY | Facility: CLINIC | Age: 81
DRG: 638 | End: 2021-07-24
Payer: MEDICARE

## 2021-07-24 ENCOUNTER — APPOINTMENT (OUTPATIENT)
Dept: PHYSICAL THERAPY | Facility: CLINIC | Age: 81
DRG: 638 | End: 2021-07-24
Payer: MEDICARE

## 2021-07-24 LAB
ANION GAP SERPL CALCULATED.3IONS-SCNC: 7 MMOL/L (ref 5–18)
BUN SERPL-MCNC: 19 MG/DL (ref 8–28)
C REACTIVE PROTEIN LHE: 0.3 MG/DL (ref 0–0.8)
CALCIUM SERPL-MCNC: 9 MG/DL (ref 8.5–10.5)
CHLORIDE BLD-SCNC: 108 MMOL/L (ref 98–107)
CO2 SERPL-SCNC: 26 MMOL/L (ref 22–31)
CREAT SERPL-MCNC: 0.81 MG/DL (ref 0.6–1.1)
ERYTHROCYTE [DISTWIDTH] IN BLOOD BY AUTOMATED COUNT: 15.9 % (ref 10–15)
ERYTHROCYTE [SEDIMENTATION RATE] IN BLOOD BY WESTERGREN METHOD: 14 MM/HR (ref 0–20)
GFR SERPL CREATININE-BSD FRML MDRD: 70 ML/MIN/1.73M2
GLUCOSE BLD-MCNC: 235 MG/DL (ref 70–125)
GLUCOSE BLDC GLUCOMTR-MCNC: 100 MG/DL (ref 70–125)
GLUCOSE BLDC GLUCOMTR-MCNC: 227 MG/DL (ref 70–125)
GLUCOSE BLDC GLUCOMTR-MCNC: 241 MG/DL (ref 70–125)
GLUCOSE BLDC GLUCOMTR-MCNC: 282 MG/DL (ref 70–125)
HCT VFR BLD AUTO: 37 % (ref 35–47)
HGB BLD-MCNC: 10.9 G/DL (ref 11.7–15.7)
MCH RBC QN AUTO: 25.8 PG (ref 26.5–33)
MCHC RBC AUTO-ENTMCNC: 29.5 G/DL (ref 31.5–36.5)
MCV RBC AUTO: 88 FL (ref 78–100)
PLATELET # BLD AUTO: 301 10E3/UL (ref 150–450)
POTASSIUM BLD-SCNC: 4 MMOL/L (ref 3.5–5)
RBC # BLD AUTO: 4.22 10E6/UL (ref 3.8–5.2)
SODIUM SERPL-SCNC: 141 MMOL/L (ref 136–145)
WBC # BLD AUTO: 10.1 10E3/UL (ref 4–11)

## 2021-07-24 PROCEDURE — 80048 BASIC METABOLIC PNL TOTAL CA: CPT | Performed by: HOSPITALIST

## 2021-07-24 PROCEDURE — 86255 FLUORESCENT ANTIBODY SCREEN: CPT | Performed by: HOSPITALIST

## 2021-07-24 PROCEDURE — 250N000013 HC RX MED GY IP 250 OP 250 PS 637: Performed by: HOSPITALIST

## 2021-07-24 PROCEDURE — 120N000001 HC R&B MED SURG/OB

## 2021-07-24 PROCEDURE — 97530 THERAPEUTIC ACTIVITIES: CPT | Mod: GP

## 2021-07-24 PROCEDURE — 250N000013 HC RX MED GY IP 250 OP 250 PS 637: Performed by: NURSE PRACTITIONER

## 2021-07-24 PROCEDURE — 36415 COLL VENOUS BLD VENIPUNCTURE: CPT | Performed by: HOSPITALIST

## 2021-07-24 PROCEDURE — 97116 GAIT TRAINING THERAPY: CPT | Mod: GP

## 2021-07-24 PROCEDURE — 250N000012 HC RX MED GY IP 250 OP 636 PS 637: Performed by: HOSPITALIST

## 2021-07-24 PROCEDURE — 250N000011 HC RX IP 250 OP 636: Performed by: HOSPITALIST

## 2021-07-24 PROCEDURE — 97535 SELF CARE MNGMENT TRAINING: CPT | Mod: GO

## 2021-07-24 PROCEDURE — 99233 SBSQ HOSP IP/OBS HIGH 50: CPT | Performed by: HOSPITALIST

## 2021-07-24 PROCEDURE — 86141 C-REACTIVE PROTEIN HS: CPT | Performed by: HOSPITALIST

## 2021-07-24 PROCEDURE — 85652 RBC SED RATE AUTOMATED: CPT | Performed by: HOSPITALIST

## 2021-07-24 PROCEDURE — 85027 COMPLETE CBC AUTOMATED: CPT | Performed by: HOSPITALIST

## 2021-07-24 RX ORDER — HYDROMORPHONE HYDROCHLORIDE 2 MG/1
2 TABLET ORAL EVERY 4 HOURS PRN
Status: DISCONTINUED | OUTPATIENT
Start: 2021-07-24 | End: 2021-07-26 | Stop reason: HOSPADM

## 2021-07-24 RX ORDER — LISINOPRIL 10 MG/1
10 TABLET ORAL DAILY
Status: DISCONTINUED | OUTPATIENT
Start: 2021-07-24 | End: 2021-07-26 | Stop reason: HOSPADM

## 2021-07-24 RX ORDER — PIOGLITAZONEHYDROCHLORIDE 15 MG/1
15 TABLET ORAL DAILY
Status: DISCONTINUED | OUTPATIENT
Start: 2021-07-24 | End: 2021-07-26 | Stop reason: HOSPADM

## 2021-07-24 RX ORDER — HYDROMORPHONE HYDROCHLORIDE 2 MG/1
2 TABLET ORAL 4 TIMES DAILY
Status: DISCONTINUED | OUTPATIENT
Start: 2021-07-24 | End: 2021-07-25

## 2021-07-24 RX ORDER — CLONIDINE HYDROCHLORIDE 0.1 MG/1
0.1 TABLET ORAL EVERY 4 HOURS PRN
Status: DISCONTINUED | OUTPATIENT
Start: 2021-07-24 | End: 2021-07-25

## 2021-07-24 RX ADMIN — HYDRALAZINE HYDROCHLORIDE 5 MG: 20 INJECTION INTRAMUSCULAR; INTRAVENOUS at 03:11

## 2021-07-24 RX ADMIN — GLIPIZIDE 20 MG: 10 TABLET ORAL at 16:59

## 2021-07-24 RX ADMIN — HYDROMORPHONE HYDROCHLORIDE 2 MG: 2 TABLET ORAL at 13:53

## 2021-07-24 RX ADMIN — HYDROMORPHONE HYDROCHLORIDE 2 MG: 2 TABLET ORAL at 16:59

## 2021-07-24 RX ADMIN — DIAZEPAM 0.5 MG: 5 SOLUTION ORAL at 08:40

## 2021-07-24 RX ADMIN — MENTHOL AND METHYL SALICYLATE: 7.6; 29 OINTMENT TOPICAL at 08:48

## 2021-07-24 RX ADMIN — NYSTATIN 500000 UNITS: 500000 SUSPENSION ORAL at 21:04

## 2021-07-24 RX ADMIN — EMPAGLIFLOZIN 25 MG: 25 TABLET, FILM COATED ORAL at 08:12

## 2021-07-24 RX ADMIN — DIAZEPAM 0.5 MG: 5 SOLUTION ORAL at 15:11

## 2021-07-24 RX ADMIN — INSULIN ASPART 2 UNITS: 100 INJECTION, SOLUTION INTRAVENOUS; SUBCUTANEOUS at 12:47

## 2021-07-24 RX ADMIN — ACETAMINOPHEN 325 MG: 325 TABLET ORAL at 06:18

## 2021-07-24 RX ADMIN — NYSTATIN 500000 UNITS: 500000 SUSPENSION ORAL at 13:53

## 2021-07-24 RX ADMIN — OXYBUTYNIN CHLORIDE 10 MG: 5 TABLET, FILM COATED, EXTENDED RELEASE ORAL at 21:04

## 2021-07-24 RX ADMIN — NYSTATIN 500000 UNITS: 500000 SUSPENSION ORAL at 08:40

## 2021-07-24 RX ADMIN — ACETAMINOPHEN 975 MG: 325 TABLET ORAL at 08:11

## 2021-07-24 RX ADMIN — PREDNISONE 6 MG: 1 TABLET ORAL at 08:11

## 2021-07-24 RX ADMIN — DIAZEPAM 0.5 MG: 5 SOLUTION ORAL at 21:15

## 2021-07-24 RX ADMIN — LIDOCAINE 3 PATCH: 246 PATCH TOPICAL at 08:20

## 2021-07-24 RX ADMIN — ACETAMINOPHEN 975 MG: 325 TABLET ORAL at 21:04

## 2021-07-24 RX ADMIN — GABAPENTIN 200 MG: 100 CAPSULE ORAL at 21:06

## 2021-07-24 RX ADMIN — AMLODIPINE BESYLATE 10 MG: 10 TABLET ORAL at 08:12

## 2021-07-24 RX ADMIN — ACETAMINOPHEN 975 MG: 325 TABLET ORAL at 15:11

## 2021-07-24 RX ADMIN — HYDROMORPHONE HYDROCHLORIDE 2 MG: 2 TABLET ORAL at 10:24

## 2021-07-24 RX ADMIN — DULOXETINE HYDROCHLORIDE 60 MG: 60 CAPSULE, DELAYED RELEASE PELLETS ORAL at 08:12

## 2021-07-24 RX ADMIN — LAMOTRIGINE 100 MG: 100 TABLET ORAL at 08:12

## 2021-07-24 RX ADMIN — HYDROXYZINE HYDROCHLORIDE 25 MG: 25 TABLET, FILM COATED ORAL at 02:16

## 2021-07-24 RX ADMIN — PANTOPRAZOLE SODIUM 40 MG: 20 TABLET, DELAYED RELEASE ORAL at 06:19

## 2021-07-24 RX ADMIN — ATORVASTATIN CALCIUM 10 MG: 10 TABLET, FILM COATED ORAL at 21:04

## 2021-07-24 RX ADMIN — LAMOTRIGINE 150 MG: 150 TABLET ORAL at 21:04

## 2021-07-24 RX ADMIN — HYDROMORPHONE HYDROCHLORIDE 2 MG: 2 TABLET ORAL at 21:06

## 2021-07-24 RX ADMIN — NYSTATIN 500000 UNITS: 500000 SUSPENSION ORAL at 16:59

## 2021-07-24 RX ADMIN — OXYCODONE HYDROCHLORIDE 5 MG: 5 TABLET ORAL at 03:02

## 2021-07-24 RX ADMIN — PIOGLITAZONE 15 MG: 15 TABLET ORAL at 08:12

## 2021-07-24 RX ADMIN — LISINOPRIL 10 MG: 10 TABLET ORAL at 08:12

## 2021-07-24 RX ADMIN — GLIPIZIDE 20 MG: 10 TABLET ORAL at 08:11

## 2021-07-24 RX ADMIN — GABAPENTIN 200 MG: 100 CAPSULE ORAL at 08:12

## 2021-07-24 RX ADMIN — INSULIN ASPART 3 UNITS: 100 INJECTION, SOLUTION INTRAVENOUS; SUBCUTANEOUS at 17:11

## 2021-07-24 NOTE — PLAN OF CARE
Problem: Pain Acute  Goal: Acceptable Pain Control and Functional Ability  Outcome: Improving  Intervention: Develop Pain Management Plan  Recent Flowsheet Documentation  Taken 7/23/2021 4254 by Flores Greenberg RN  Pain Management Interventions: medication (see MAR)     Problem: Diabetes Comorbidity  Goal: Blood Glucose Level Within Targeted Range  Outcome: Improving   Pt is doing better.  Pt is forgetful, uses call light appropriately.  She is quite impulsive though.  She rates her pain around a 5.  She has both scheduled and prn pain medications.  Blood glucose has been coming down.  We will continue to assess and monitor.

## 2021-07-24 NOTE — PLAN OF CARE
Problem: Pain Acute  Goal: Acceptable Pain Control and Functional Ability  Outcome: Improving   Pain managed within pain range of 3-5 today. With new scheduled dilaudid and valium aiding in pain and anxiety relief.     BP improving to . Not within parameters to give prn medications. Voiding adequately. Needs constant redirection and reorientation to task at hand.     Caitlin Luhter RN

## 2021-07-24 NOTE — PLAN OF CARE
Problem: Pain Acute  Goal: Acceptable Pain Control and Functional Ability  7/24/2021 0546 by Flores Greenberg, RN  Outcome: No Change  7/23/2021 2332 by Flores Greenberg, RN  Outcome: Improving  Intervention: Develop Pain Management Plan  Recent Flowsheet Documentation  Taken 7/23/2021 7079 by Flores Greenberg, RN  Pain Management Interventions: medication (see MAR)    Pt was having a lot more pain tonight.  She woke up with a leg spasm/izabella horse.  She was very tearful and it was hard for her to ambulate to the bathroom.  The leg still hurting a bit, but less then it was.  She gets herself very worked up which then increases her anxiety.  Using lavender and soft music to help alleviate her anxiety. Will continue to assess.

## 2021-07-24 NOTE — PROGRESS NOTES
[facility] ACUTE PAIN SERVICE    Daily PAIN Progress Note    Assessment/Plan:  Tonya Yang is a 80 year old female who was admitted on 7/19/2021.  Pain team was asked to see the patient for acute on chronic pain, worsening back pain with radiating RLE pain. Admitted for hyperglycemia. History of DM, PAULINE, chronic low back pain radiating into RLE, hearing loss, anxiety, depression, asthma, COPD, DM, GERD, hypertension, hyperlipidemia, neuropathy, bipolar.      Opioid Induced Respiratory Depression Risk Assessment:?high (age, renal, COPD, concurrent use of benzos)     Agree with change to Dilaudid per hospitalist this AM, chart checked by PMT, will be re-assessed tomorrow and will plan on increasing gabapentin dose--if still needed--at that time.    PLAN:   1) Pain is consistent with chronic pain, with exacerbation.The patient's home MME was 30 mg daily.  MR lumbar spine with severe spinal stenosis, multilevel lumbar spondylosis. Psych Consulted. Ortho Consulted   2)Multimodal Medication Therapy  Topical: icy hot q6hprn, lidocaine patches x3  NSAID'S: CrCl 58.6ml/min.  None, on prednisone daily   Muscle Relaxants:  None   Adjuvants: Tylenol 975 mg po tid ordered by Hospitalist,  Gabapentin 200 mg bid 7/22/2021, Atarax 25mg qhsprn (home med) increased to q6h prn for pain, anxiety, sleep, itching  Antidepressants/anxiolytics: Cymbalta 60 mg daily, valium per psych, Lamictal per psych  Opioids: oxycodone 5 mg po q 4 h prn: change today per hospitalist to 2mg q4hprn, agree with changes  IV Pain medication: discontinue  3)Non-medication interventions: ice, heat   Acupuncture consult, Integrative consult - if patient agreeable   4)Constipation Prophylaxis: prn senna, miralax, supp   5) Care Teams: HMS, psych, Ortho     -Opioid prescriber has been [place] PMP pulled from system on 7/20/21. This indicates chronic Percocet use  7/12 Percocet 5/325mg 120 for 30 d  6/10 Percocet 120 for 30  5/6 same  4/6  same  3/4 same  Discharge Recommendations - We recommend prescribing the following at the time of discharge: No opioids - resume home percocet. Gabapentin 200 mg bid. Follow up PCP.      Lorraine Smith PHarmD  Acute Care Pain Management Program  [facility] ([facility], [place], [place])  Monday-Friday 8a-4p   Page via online paging system  or call [phone]

## 2021-07-24 NOTE — PROGRESS NOTES
[facility] Medicine PROGRESS NOTE      Identification/Summary:       Presented with hyperglycemia, sugars over 400, possibly over 500. Recently had kenalog injections in rheum clinic. Also chronically on prednisone. She also says that she recently stopped metformin due to diarrhea. Says that she has been extremely thirsty, says she would drink the whole river if she could.     Hospital stay has been complicated by severe low back pain, radiating down right leg and worsening urinary incontinence. Trying to increase pain meds, starting insulin. MR lumbar spine with severe spinal stenosis. Spine surgery reviewed.  No indication for surgery at this time.  Pain team following.  Also very anxious, psychiatry consulted, started Valium 3 times daily.    Sugars very difficult to control, poor candidate for insulin as she lives alone and won't be able to give herself insulin according to her and her son. Trying to optimize oral DM meds. DM educator following.     Consultants:  Pain team  Spine surgery-signed off  Psychiatry  PT  OT    Vitals are remarkable for normal temp, heart rate, respirations but blood pressure is quite elevated today at 190/86.  No labs are drawn today.  Nursing notes reviewed, she had a lot more pain last night, was tearful and it was difficult for her to ambulate.    Assessment and Plan:  Hyperglycemia, poorly controlled DM2  More acutely related to recent steroid injections, chronic low-dose prednisone  A1c over 10, suggests very poor control at baseline  Tried insulin but pt unlikely to manage this independently  Trying to optimize oral options, restrict carbohydrate intake  Appreciate DM ed help, really a challenging situation  Increased glipizide dose, continue Jardiance, adding Actos  Ideally would find oral regimen for glucose control to prevent dehydration, readmission     Acute on chronic pain, severe spinal stenosis  Describes worsening joint pains, hands, bilat shoulder, rt leg  Does  see rheumatology as outpt, had recent injection in shoulder, hand  Not really sure if she has underlying rheum disease or not, past labs negative, will check ANCA which I don't see past  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 which may be associated with vasculitis, really doesn't seem like she has active vasculitis  Appreciate pain team help  Appreciate spine surgeon recommendations, really severe spinal stenosis, foraminal stenosis but not a good surgical candidate  Avoiding IV opiates  Says that she is typically on percocet at home  Also really helped by salonpas patches recently, icy hot ordered here  Seems she is rx'd mobic, lidocaine patch  Holding NSAIDs due to PAULINE  Ordered scheduled tylenol, will change to scheduled Dilaudid in place of oxycodone  Patient feels IV Dilaudid was very helpful, will change to oral and see if she gets more benefit from this  Added gabapentin, vistaril  Psych added low dose valium tid for anxiety and to help with muscle spasms  This morning patient's son says that she takes Depakote, this was not listed in med rec and has not been given here  We will ask for pharmacy review, will start Depakote if she typically takes this at home, may help with her symptoms    Hypertension.   Blood pressures poorly controlled during the stay  As an outpatient takes triamterene/hydrochlorothiazide, this was held due to dehydration and PAULINE  Started on amlodipine here with increasing doses, not effective yet  We will add lisinopril this morning as creatinine has returned to baseline  Continue to hold diuretics given the risk of dehydration with her poorly controlled diabetes  We will add clonidine as needed    Confusion-resolved  Unclear etiology, may have been due to meds, dehydration  Not really confused now, probably has some mild underlying memory issues  Had elevated ammonia level, not encephalopathic now, no asterixis  No clear source of infection  Reported taking a lot of valium at home, not on   per pain team  Valium decreased to small dose 3 times daily for her anxiety and neck pain  May have been due to percocet  MRI neg other than incidental meningioma  Not a drinker  Appreciate therapy evals    Mouth pain, burning  Possibly related to thrush, started nystatin swish and spit  B12 level normal     Fatty liver  Noted on US abdomen  Recommend outpt follow up     PAULINE-resolved  Prerenal due to dehydration, hyperglycemia  Creat normalized, tolerating oral intake, stopped fluids     Hearing loss, cochlear hydrops  Takes low dose prednisone chronically for this per pt.      Mental illness  Anxiety seems significant, son also concerned about this  Reported taking valium at home but not on  per pain team  Appreciate psychiatry eval  Continue home cymbalta, lamictal evening dose increased  Reportedly takes Depakote, this was not listed here and has not been given  Added scheduled valium tid     Diet: Consistent Carb 45 grams CHO per Meal Diet  Fluids: none  Pain meds: as above  Therapy:will ask for pt and ot consults, wonder if she would benefit from TCU or increase care at home  DVT Prophylaxis:  Low risk, ambulating in room, SCDs when resting  Code Status: Full Code  Disposition: stay today    Interval History/Subjective:  Says that she had a severe bout of pain last night that led to her crying, inability to move her leg.  Says that her night was horrible.  Denies chest pain, shortness of breath, abdominal pain, nausea, urinary complaints.  Primary complaint today is still the severe low back and right leg pain.  Also has some pain in the left leg but not quite as severe.    Physical Exam/Objective:  Gen: alert, calm, appears comfortable this morning  ENT: no scleral icterus  Pulm: lungs are clear without wheezing, crackles  CV: regular rate and rhythm, no pitting edema  Neuro: Moving well in bed, able to move both legs equally  Psych: Calm this morning    Medications:     acetaminophen  975 mg Oral TID      amLODIPine  10 mg Oral Daily     atorvastatin  10 mg Oral At Bedtime     diazepam  0.5 mg Oral TID     DULoxetine  60 mg Oral Daily     empagliflozin  25 mg Oral Daily     gabapentin  200 mg Oral BID     glipiZIDE  20 mg Oral BID AC     insulin aspart  1-7 Units Subcutaneous TID AC     lamoTRIgine  100 mg Oral Daily     lamoTRIgine  150 mg Oral Daily     lidocaine  3 patch Transdermal Q24H     lidocaine   Transdermal Q8H     lidocaine   Transdermal Daily     lisinopril  10 mg Oral Daily     nystatin  500,000 Units Swish & Spit 4x Daily     oxybutynin ER  10 mg Oral At Bedtime     pantoprazole  40 mg Oral QAM AC     pioglitazone  15 mg Oral Daily     predniSONE  6 mg Oral Daily     sodium chloride (PF)  3 mL Intracatheter Q8H     [Held by provider] triamterene-HCTZ  1 capsule Oral Daily       David Costello DO   Hospitalist  [facility]

## 2021-07-25 LAB
ANION GAP SERPL CALCULATED.3IONS-SCNC: 6 MMOL/L (ref 5–18)
BUN SERPL-MCNC: 20 MG/DL (ref 8–28)
CALCIUM SERPL-MCNC: 9.1 MG/DL (ref 8.5–10.5)
CHLORIDE BLD-SCNC: 109 MMOL/L (ref 98–107)
CO2 SERPL-SCNC: 28 MMOL/L (ref 22–31)
CREAT SERPL-MCNC: 0.78 MG/DL (ref 0.6–1.1)
GFR SERPL CREATININE-BSD FRML MDRD: 73 ML/MIN/1.73M2
GLUCOSE BLD-MCNC: 171 MG/DL (ref 70–125)
GLUCOSE BLDC GLUCOMTR-MCNC: 202 MG/DL (ref 70–125)
GLUCOSE BLDC GLUCOMTR-MCNC: 253 MG/DL (ref 70–125)
GLUCOSE BLDC GLUCOMTR-MCNC: 256 MG/DL (ref 70–125)
GLUCOSE BLDC GLUCOMTR-MCNC: 281 MG/DL (ref 70–125)
HOLD SPECIMEN: NORMAL
MAGNESIUM SERPL-MCNC: 1.8 MG/DL (ref 1.8–2.6)
POTASSIUM BLD-SCNC: 3.8 MMOL/L (ref 3.5–5)
SODIUM SERPL-SCNC: 143 MMOL/L (ref 136–145)

## 2021-07-25 PROCEDURE — 250N000013 HC RX MED GY IP 250 OP 250 PS 637: Performed by: NURSE PRACTITIONER

## 2021-07-25 PROCEDURE — 250N000013 HC RX MED GY IP 250 OP 250 PS 637: Performed by: FAMILY MEDICINE

## 2021-07-25 PROCEDURE — 99207 PR CDG-MDM COMPONENT: MEETS MODERATE - DOWN CODED: CPT | Performed by: FAMILY MEDICINE

## 2021-07-25 PROCEDURE — 80048 BASIC METABOLIC PNL TOTAL CA: CPT | Performed by: FAMILY MEDICINE

## 2021-07-25 PROCEDURE — 120N000001 HC R&B MED SURG/OB

## 2021-07-25 PROCEDURE — 250N000013 HC RX MED GY IP 250 OP 250 PS 637: Performed by: HOSPITALIST

## 2021-07-25 PROCEDURE — 83735 ASSAY OF MAGNESIUM: CPT | Performed by: FAMILY MEDICINE

## 2021-07-25 PROCEDURE — 250N000012 HC RX MED GY IP 250 OP 636 PS 637: Performed by: HOSPITALIST

## 2021-07-25 PROCEDURE — 36415 COLL VENOUS BLD VENIPUNCTURE: CPT | Performed by: FAMILY MEDICINE

## 2021-07-25 PROCEDURE — 99232 SBSQ HOSP IP/OBS MODERATE 35: CPT | Performed by: FAMILY MEDICINE

## 2021-07-25 RX ORDER — HYDRALAZINE HYDROCHLORIDE 10 MG/1
10 TABLET, FILM COATED ORAL EVERY 4 HOURS PRN
Status: DISCONTINUED | OUTPATIENT
Start: 2021-07-25 | End: 2021-07-26 | Stop reason: HOSPADM

## 2021-07-25 RX ORDER — HYDROMORPHONE HYDROCHLORIDE 2 MG/1
2 TABLET ORAL 3 TIMES DAILY
Status: DISCONTINUED | OUTPATIENT
Start: 2021-07-25 | End: 2021-07-26 | Stop reason: HOSPADM

## 2021-07-25 RX ADMIN — OXYBUTYNIN CHLORIDE 10 MG: 5 TABLET, FILM COATED, EXTENDED RELEASE ORAL at 21:24

## 2021-07-25 RX ADMIN — DIAZEPAM 0.5 MG: 5 SOLUTION ORAL at 21:24

## 2021-07-25 RX ADMIN — LAMOTRIGINE 150 MG: 150 TABLET ORAL at 21:24

## 2021-07-25 RX ADMIN — GLIPIZIDE 20 MG: 10 TABLET ORAL at 08:35

## 2021-07-25 RX ADMIN — INSULIN ASPART 3 UNITS: 100 INJECTION, SOLUTION INTRAVENOUS; SUBCUTANEOUS at 16:17

## 2021-07-25 RX ADMIN — Medication 12.5 MG: at 10:18

## 2021-07-25 RX ADMIN — LAMOTRIGINE 100 MG: 100 TABLET ORAL at 08:47

## 2021-07-25 RX ADMIN — PIOGLITAZONE 15 MG: 15 TABLET ORAL at 08:34

## 2021-07-25 RX ADMIN — GABAPENTIN 200 MG: 100 CAPSULE ORAL at 21:24

## 2021-07-25 RX ADMIN — DIAZEPAM 0.5 MG: 5 SOLUTION ORAL at 14:29

## 2021-07-25 RX ADMIN — DULOXETINE HYDROCHLORIDE 60 MG: 60 CAPSULE, DELAYED RELEASE PELLETS ORAL at 08:34

## 2021-07-25 RX ADMIN — GABAPENTIN 200 MG: 100 CAPSULE ORAL at 08:35

## 2021-07-25 RX ADMIN — LISINOPRIL 10 MG: 10 TABLET ORAL at 08:35

## 2021-07-25 RX ADMIN — ACETAMINOPHEN 975 MG: 325 TABLET ORAL at 08:34

## 2021-07-25 RX ADMIN — HYDROMORPHONE HYDROCHLORIDE 2 MG: 2 TABLET ORAL at 06:19

## 2021-07-25 RX ADMIN — ATORVASTATIN CALCIUM 10 MG: 10 TABLET, FILM COATED ORAL at 21:25

## 2021-07-25 RX ADMIN — PANTOPRAZOLE SODIUM 40 MG: 20 TABLET, DELAYED RELEASE ORAL at 06:18

## 2021-07-25 RX ADMIN — HYDROXYZINE HYDROCHLORIDE 25 MG: 25 TABLET, FILM COATED ORAL at 03:42

## 2021-07-25 RX ADMIN — PREDNISONE 6 MG: 1 TABLET ORAL at 08:36

## 2021-07-25 RX ADMIN — ACETAMINOPHEN 975 MG: 325 TABLET ORAL at 14:29

## 2021-07-25 RX ADMIN — NYSTATIN 500000 UNITS: 500000 SUSPENSION ORAL at 21:24

## 2021-07-25 RX ADMIN — METFORMIN HYDROCHLORIDE 250 MG: 500 TABLET, FILM COATED ORAL at 17:17

## 2021-07-25 RX ADMIN — NYSTATIN 500000 UNITS: 500000 SUSPENSION ORAL at 17:17

## 2021-07-25 RX ADMIN — HYDROMORPHONE HYDROCHLORIDE 2 MG: 2 TABLET ORAL at 21:24

## 2021-07-25 RX ADMIN — HYDROMORPHONE HYDROCHLORIDE 2 MG: 2 TABLET ORAL at 08:35

## 2021-07-25 RX ADMIN — LIDOCAINE 3 PATCH: 246 PATCH TOPICAL at 08:47

## 2021-07-25 RX ADMIN — GLIPIZIDE 20 MG: 10 TABLET ORAL at 16:20

## 2021-07-25 RX ADMIN — EMPAGLIFLOZIN 25 MG: 25 TABLET, FILM COATED ORAL at 08:35

## 2021-07-25 RX ADMIN — INSULIN ASPART 3 UNITS: 100 INJECTION, SOLUTION INTRAVENOUS; SUBCUTANEOUS at 12:38

## 2021-07-25 RX ADMIN — NYSTATIN 500000 UNITS: 500000 SUSPENSION ORAL at 14:29

## 2021-07-25 RX ADMIN — ACETAMINOPHEN 975 MG: 325 TABLET ORAL at 21:24

## 2021-07-25 RX ADMIN — NYSTATIN 500000 UNITS: 500000 SUSPENSION ORAL at 08:36

## 2021-07-25 RX ADMIN — HYDROMORPHONE HYDROCHLORIDE 2 MG: 2 TABLET ORAL at 00:28

## 2021-07-25 RX ADMIN — INSULIN ASPART 2 UNITS: 100 INJECTION, SOLUTION INTRAVENOUS; SUBCUTANEOUS at 08:34

## 2021-07-25 RX ADMIN — DIAZEPAM 0.5 MG: 5 SOLUTION ORAL at 08:47

## 2021-07-25 RX ADMIN — AMLODIPINE BESYLATE 10 MG: 10 TABLET ORAL at 08:35

## 2021-07-25 RX ADMIN — HYDROMORPHONE HYDROCHLORIDE 2 MG: 2 TABLET ORAL at 14:29

## 2021-07-25 NOTE — PLAN OF CARE
Problem: Adult Inpatient Plan of Care  Goal: Plan of Care Review  Outcome: No Change     Problem: Hyperglycemia  Goal: Blood Glucose Level Within Targeted Range  Outcome: Improving   Pt is alert and oriented, voiding and using the call light appropriately.  She  has had some of her medications changed and/or added to help with her anxiety and chronic pain.  These do seem to be helping.  Pt is aware her blood glucose levels are high but she is also on prednisone which does contribute to higher levels.  We will continue to assess and monitor.

## 2021-07-25 NOTE — PLAN OF CARE
Problem: Pain Acute  Goal: Acceptable Pain Control and Functional Ability  Outcome: Improving   Pain managed to minimal to no pain with scheduled medications. PT reported feeling too drowsy so Dilaudid changed to 3 x a day.      Problem: Hypertension Acute  Goal: Blood Pressure Within Desired Range  Outcome: Improving   BP to 140s and 160's today.      Problem: Diabetes Comorbidity  Goal: Blood Glucose Level Within Targeted Range  Outcome: Improving   BG mid 200s today. Added metformin low dose. Pt has hx of diarrhea with this medication so starting on low dose.     Plan to go home tomorrow pending medical improvement.     Caitlin Luther RN

## 2021-07-25 NOTE — PROGRESS NOTES
[facility] ACUTE PAIN SERVICE    Daily PAIN Chart Check note    Assessment/Plan:  Tonya Yang is a 80 year old female who was admitted on 7/19/2021.  Pain team was asked to see the patient for acute on chronic pain, worsening back pain with radiating RLE pain. Admitted for hyperglycemia. History of DM, PAULINE, chronic low back pain radiating into RLE, hearing loss, anxiety, depression, asthma, COPD, DM, GERD, hypertension, hyperlipidemia, neuropathy, bipolar.      Opioid Induced Respiratory Depression Risk Assessment:?high (age, renal, COPD, concurrent use of benzos)     In the past 24 hours, patient has utilized 12 mg of PO Dilaudid for an MME of 48. Chart reviewed, appears patient is under better pain control. Patient will be seen in person by PMT on Monday.     PLAN:   1) Pain is consistent with chronic pain, with exacerbation.The patient's home MME was 30 mg daily.  MR lumbar spine with severe spinal stenosis, multilevel lumbar spondylosis. Psych Consulted. Ortho Consulted   2)Multimodal Medication Therapy  Topical: icy hot q6hprn, lidocaine patches x3  NSAID'S: CrCl 58.6ml/min.  None, on prednisone daily   Muscle Relaxants:  None   Adjuvants: Tylenol 975 mg po tid ordered by Hospitalist,  Gabapentin 200 mg bid 7/22/2021, Atarax 25mg qhsprn (home med) increased to q6h prn for pain, anxiety, sleep, itching  Antidepressants/anxiolytics: Cymbalta 60 mg daily, valium per psych, Lamictal per psych  Opioids: Dilaudid 2-4 qid and 2mg q4hprn  IV Pain medication: discontinue  3)Non-medication interventions: ice, heat   Acupuncture consult, Integrative consult - if patient agreeable   4)Constipation Prophylaxis: prn senna, miralax, supp   5) Care Teams: HMS, psych, Ortho     -Opioid prescriber has been Ananya MO  pulled from system on 7/20/21. This indicates chronic Percocet use  7/12 Percocet 5/325mg 120 for 30 d  6/10 Percocet 120 for 30  5/6 same  4/6 same  3/4 same  Discharge Recommendations - We  recommend prescribing the following at the time of discharge: No opioids - resume home percocet. Gabapentin 200 mg bid. Follow up PCP.      Lorraine Smith PHarmD  Acute Care Pain Management Program  [facility] ([place], [place], [place])  Monday-Friday 8a-4p   Page via online paging system  or call [phone]

## 2021-07-25 NOTE — PROGRESS NOTES
[facility] MEDICINE PROGRESS NOTE      Identification/Summary: Presented with hyperglycemia, sugars over 400, possibly over 500. Recently had kenalog injections in rheum clinic. Also chronically on prednisone. She also says that she recently stopped metformin due to diarrhea. Says that she has been extremely thirsty, says she would drink the whole river if she could.      Hospital stay has been complicated by severe low back pain, radiating down right leg and worsening urinary incontinence. Trying to increase pain meds, starting insulin. MR lumbar spine with severe spinal stenosis. Spine surgery reviewed.  No indication for surgery at this time.  Pain team following.  Also very anxious, psychiatry consulted, started low-dose diazepam 3 times daily.     Sugars very difficult to control, poor candidate for insulin as she lives alone and won't be able to give herself insulin according to her and her son. Trying to optimize oral DM meds. DM educator following.  Had significant diarrhea from Metformin in the past.  Agreeable to lower dose Metformin.     Consultants:  Pain team  Spine surgery-signed off  Psychiatry  PT  OT     Blood pressures have been difficult to control but improved today with the addition of chlorthalidone and increase in amlodipine and lisinopril yesterday.     Assessment and Plan:  Hyperglycemia, poorly controlled DM2  More acutely related to recent steroid injections, chronic low-dose prednisone  A1c over 10, suggests very poor control at baseline  Tried insulin but pt unlikely to manage this independently  Trying to optimize oral options, restrict carbohydrate intake  Appreciate DM ed help, really a challenging situation  Increased glipizide dose, continue Jardiance, adding Actos  Ideally would find oral regimen for glucose control to prevent dehydration, readmission  Plan: Add low-dose Metformin 250 mg today     Acute on chronic pain, severe spinal stenosis  Describes  worsening joint pains, hands, bilat shoulder, rt leg  Does see rheumatology as outpt, had recent injection in shoulder, hand  Not really sure if she has underlying rheum disease or not, past labs negative, will check ANCA which I don't see past  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 which may be associated with vasculitis, really doesn't seem like she has active vasculitis  Appreciate pain team help  Appreciate spine surgeon recommendations, really severe spinal stenosis, foraminal stenosis but not a good surgical candidate  Avoiding IV opiates  Says that she is typically on percocet at home  Also really helped by salonpas patches recently, icy hot ordered here  Seems she is rx'd mobic, lidocaine patch  Holding NSAIDs due to PAULINE  Ordered scheduled tylenol, will change to scheduled Dilaudid in place of oxycodone  Patient feels IV Dilaudid was very helpful, will change to oral and see if she gets more benefit from this  Added gabapentin, vistaril  Psych added low dose valium tid for anxiety and to help with muscle spasms  Plan: Because patient is complaining a bit of being oversedated we will decrease her hydromorphone to 3 times daily with as needed doses     Hypertension.   Blood pressures poorly controlled during the stay but now improved with the addition of chlorthalidone and doubling amlodipine and lisinopril.  Plan: Follow blood pressures closely     Confusion-resolved  Unclear etiology, may have been due to meds, dehydration  Not really confused now, probably has some mild underlying memory issues  Had elevated ammonia level, not encephalopathic now, no asterixis  No clear source of infection  Reported taking a lot of valium at home, not on  per pain team  Valium decreased to small dose 3 times daily for her anxiety and neck pain  May have been due to percocet  MRI neg other than incidental meningioma  Not a drinker  Appreciate therapy evals     Mouth pain, burning  Possibly related to thrush, started nystatin  swish and spit  B12 level normal     Fatty liver  Noted on US abdomen  Recommend outpt follow up     PAULINE-resolved  Prerenal due to dehydration, hyperglycemia  Creat normalized, tolerating oral intake, stopped fluids     Hearing loss, cochlear hydrops  Takes low dose prednisone chronically for this per pt.      Mental illness  Anxiety seems significant, son also concerned about this  Reported taking valium at home but not on  per pain team  Appreciate psychiatry eval  Continue home cymbalta, lamictal evening dose increased  Reportedly takes Depakote, this was not listed here and has not been given  Added scheduled valium tid      Diet: Consistent Carb 45 grams CHO per Meal Diet  Fluids: none  Pain meds: as above  Therapy:will ask for pt and ot consults, wonder if she would benefit from TCU or increase care at home  DVT Prophylaxis:  Low risk, ambulating in room, SCDs when resting  Code Status: Full Code  Disposition: stay today    Interval History/Subjective:  Patient feels like she is overly medicated and sleeping now much of the time.  Overall pain is much improved.  She would like to straight a midpoint with the narcotic pain medicines so that she gets good pain relief without being overly sedated.  Agreeable to trying Metformin but it gave her pretty severe diarrhea when she was on a larger dose in the past.    Physical Exam/Objective:  Temp:  [97.2  F (36.2  C)-98.3  F (36.8  C)] 97.9  F (36.6  C)  Pulse:  [67-84] 84  Resp:  [16-18] 18  BP: (141-188)/(63-76) 141/63  SpO2:  [95 %-97 %] 97 %    Body mass index is 31.05 kg/m .    GENERAL:  Alert, appears comfortable, appears somewhat anxious, appears stated age   HEAD:  Normocephalic, without obvious abnormality, atraumatic   LUNGS:   Clear to auscultation bilaterally, no rales, rhonchi, or wheezing, symmetric chest rise on inhalation, respirations unlabored   HEART:  Regular rate and rhythm, no murmur   ABDOMEN:   Soft, non-tender, no masses, no organomegaly,  no rebound or guarding   EXTREMITIES: Extremities normal, atraumatic, no cyanosis or edema    SKIN: Dry to touch, no exanthems in the visualized areas   NEURO: Alert, appears grossly cognitively intact, moves all four extremities freely   PSYCH: Cooperative, appears somewhat anxious     Medications:   Personally Reviewed.  Medications       acetaminophen  975 mg Oral TID     amLODIPine  10 mg Oral Daily     atorvastatin  10 mg Oral At Bedtime     chlorthalidone  12.5 mg Oral Daily     diazepam  0.5 mg Oral TID     DULoxetine  60 mg Oral Daily     empagliflozin  25 mg Oral Daily     gabapentin  200 mg Oral BID     glipiZIDE  20 mg Oral BID AC     HYDROmorphone  2 mg Oral TID     insulin aspart  1-7 Units Subcutaneous TID AC     lamoTRIgine  100 mg Oral Daily     lamoTRIgine  150 mg Oral Daily     lidocaine  3 patch Transdermal Q24H     lidocaine   Transdermal Q8H     lidocaine   Transdermal Daily     lisinopril  10 mg Oral Daily     metFORMIN  750 mg Oral Daily with supper     [START ON 7/26/2021] metFORMIN  500 mg Oral Daily with breakfast     nystatin  500,000 Units Swish & Spit 4x Daily     oxybutynin ER  10 mg Oral At Bedtime     pantoprazole  40 mg Oral QAM AC     pioglitazone  15 mg Oral Daily     predniSONE  6 mg Oral Daily     sodium chloride (PF)  3 mL Intracatheter Q8H     [Held by provider] triamterene-HCTZ  1 capsule Oral Daily       Data reviewed today: I personally reviewed all new medications, labs, imaging/diagnostics reports over the past 24 hours. Pertinent findings include:    Labs:  Most Recent 3 CBC's:Recent Labs   Lab Test 07/24/21  1234 07/23/21  0548 07/21/21  0855   WBC 10.1 12.1* 15.3*   HGB 10.9* 10.8* 11.1*   MCV 88 86 85    320 353     Most Recent 3 BMP's:Recent Labs   Lab Test 07/25/21  1153 07/25/21  1049 07/25/21  0832 07/24/21  1234 07/23/21  0547   NA  --  143  --  141 144   POTASSIUM  --  3.8  --  4.0 3.5   CHLORIDE  --  109*  --  108* 110*   CO2  --  28  --  26 27   BUN  --   20  --  19 22   CR  --  0.78  --  0.81 0.81   ANIONGAP  --  6  --  7 7   MACY  --  9.1  --  9.0 9.2   * 171* 202* 235* 150*       Aron Bolanos MD  [facility] Medicine  [facility]  Phone: #[phone]

## 2021-07-25 NOTE — PLAN OF CARE
Problem: Adult Inpatient Plan of Care  Goal: Readiness for Transition of Care  Outcome: No Change   Pt is fearful that she won't be able to manage at home.  Pt does have high anxiety that is easily heightened. We will continue to provide both physical and emotional help as needed.

## 2021-07-26 ENCOUNTER — APPOINTMENT (OUTPATIENT)
Dept: OCCUPATIONAL THERAPY | Facility: CLINIC | Age: 81
DRG: 638 | End: 2021-07-26
Payer: MEDICARE

## 2021-07-26 VITALS
WEIGHT: 186.6 LBS | HEIGHT: 65 IN | SYSTOLIC BLOOD PRESSURE: 143 MMHG | RESPIRATION RATE: 16 BRPM | DIASTOLIC BLOOD PRESSURE: 65 MMHG | BODY MASS INDEX: 31.09 KG/M2 | HEART RATE: 69 BPM | TEMPERATURE: 96.5 F | OXYGEN SATURATION: 96 %

## 2021-07-26 LAB
ANCA AB PATTERN SER IF-IMP: NORMAL
ANION GAP SERPL CALCULATED.3IONS-SCNC: 10 MMOL/L (ref 5–18)
BUN SERPL-MCNC: 19 MG/DL (ref 8–28)
C-ANCA TITR SER IF: NORMAL {TITER}
CALCIUM SERPL-MCNC: 9.4 MG/DL (ref 8.5–10.5)
CHLORIDE BLD-SCNC: 105 MMOL/L (ref 98–107)
CO2 SERPL-SCNC: 27 MMOL/L (ref 22–31)
CREAT SERPL-MCNC: 0.77 MG/DL (ref 0.6–1.1)
ERYTHROCYTE [DISTWIDTH] IN BLOOD BY AUTOMATED COUNT: 15.8 % (ref 10–15)
GFR SERPL CREATININE-BSD FRML MDRD: 74 ML/MIN/1.73M2
GLUCOSE BLD-MCNC: 128 MG/DL (ref 70–125)
GLUCOSE BLDC GLUCOMTR-MCNC: 98 MG/DL (ref 70–125)
HCT VFR BLD AUTO: 36.2 % (ref 35–47)
HGB BLD-MCNC: 10.5 G/DL (ref 11.7–15.7)
MAGNESIUM SERPL-MCNC: 1.7 MG/DL (ref 1.8–2.6)
MCH RBC QN AUTO: 24.8 PG (ref 26.5–33)
MCHC RBC AUTO-ENTMCNC: 29 G/DL (ref 31.5–36.5)
MCV RBC AUTO: 85 FL (ref 78–100)
PLATELET # BLD AUTO: 309 10E3/UL (ref 150–450)
POTASSIUM BLD-SCNC: 3.6 MMOL/L (ref 3.5–5)
RBC # BLD AUTO: 4.24 10E6/UL (ref 3.8–5.2)
SODIUM SERPL-SCNC: 142 MMOL/L (ref 136–145)
WBC # BLD AUTO: 11.7 10E3/UL (ref 4–11)

## 2021-07-26 PROCEDURE — 36415 COLL VENOUS BLD VENIPUNCTURE: CPT | Performed by: FAMILY MEDICINE

## 2021-07-26 PROCEDURE — 99239 HOSP IP/OBS DSCHRG MGMT >30: CPT | Performed by: FAMILY MEDICINE

## 2021-07-26 PROCEDURE — 250N000012 HC RX MED GY IP 250 OP 636 PS 637: Performed by: HOSPITALIST

## 2021-07-26 PROCEDURE — 85027 COMPLETE CBC AUTOMATED: CPT | Performed by: FAMILY MEDICINE

## 2021-07-26 PROCEDURE — 97535 SELF CARE MNGMENT TRAINING: CPT | Mod: GO

## 2021-07-26 PROCEDURE — 250N000013 HC RX MED GY IP 250 OP 250 PS 637: Performed by: HOSPITALIST

## 2021-07-26 PROCEDURE — 250N000013 HC RX MED GY IP 250 OP 250 PS 637: Performed by: NURSE PRACTITIONER

## 2021-07-26 PROCEDURE — 83735 ASSAY OF MAGNESIUM: CPT | Performed by: FAMILY MEDICINE

## 2021-07-26 PROCEDURE — 250N000013 HC RX MED GY IP 250 OP 250 PS 637: Performed by: FAMILY MEDICINE

## 2021-07-26 PROCEDURE — 80048 BASIC METABOLIC PNL TOTAL CA: CPT | Performed by: FAMILY MEDICINE

## 2021-07-26 RX ORDER — CHLORTHALIDONE 25 MG/1
12.5 TABLET ORAL DAILY
Qty: 15 TABLET | Refills: 0 | Status: SHIPPED | OUTPATIENT
Start: 2021-07-27 | End: 2022-03-07

## 2021-07-26 RX ORDER — PIOGLITAZONEHYDROCHLORIDE 15 MG/1
15 TABLET ORAL DAILY
Qty: 30 TABLET | Refills: 0 | Status: SHIPPED | OUTPATIENT
Start: 2021-07-27 | End: 2022-03-07

## 2021-07-26 RX ORDER — MENTHOL AND METHYL SALICYLATE 7.6; 29 G/100G; G/100G
OINTMENT TOPICAL EVERY 6 HOURS PRN
Qty: 99.2 G | Refills: 0 | Status: SHIPPED | OUTPATIENT
Start: 2021-07-26 | End: 2022-03-07

## 2021-07-26 RX ORDER — AMLODIPINE BESYLATE 10 MG/1
10 TABLET ORAL DAILY
Qty: 30 TABLET | Refills: 0 | Status: SHIPPED | OUTPATIENT
Start: 2021-07-27 | End: 2022-03-07

## 2021-07-26 RX ORDER — ACETAMINOPHEN 325 MG/1
975 TABLET ORAL 3 TIMES DAILY
Start: 2021-07-26 | End: 2022-03-07

## 2021-07-26 RX ORDER — LISINOPRIL 10 MG/1
10 TABLET ORAL DAILY
Qty: 30 TABLET | Refills: 0 | Status: SHIPPED | OUTPATIENT
Start: 2021-07-27 | End: 2022-03-07

## 2021-07-26 RX ORDER — HYDROMORPHONE HYDROCHLORIDE 2 MG/1
2 TABLET ORAL EVERY 4 HOURS PRN
Qty: 10 TABLET | Refills: 0 | Status: ON HOLD | OUTPATIENT
Start: 2021-07-26 | End: 2022-03-12

## 2021-07-26 RX ORDER — GLIPIZIDE 10 MG/1
20 TABLET ORAL
Qty: 120 TABLET | Refills: 0 | Status: ON HOLD | OUTPATIENT
Start: 2021-07-26 | End: 2022-03-12

## 2021-07-26 RX ORDER — LIDOCAINE 4 G/G
3 PATCH TOPICAL EVERY 24 HOURS
Qty: 90 PATCH | Refills: 0 | Status: ON HOLD | OUTPATIENT
Start: 2021-07-26 | End: 2022-03-12

## 2021-07-26 RX ORDER — GABAPENTIN 100 MG/1
200 CAPSULE ORAL 2 TIMES DAILY
Qty: 60 CAPSULE | Refills: 0 | Status: ON HOLD | OUTPATIENT
Start: 2021-07-26 | End: 2022-03-12

## 2021-07-26 RX ORDER — HYDROMORPHONE HYDROCHLORIDE 2 MG/1
2 TABLET ORAL 3 TIMES DAILY
Qty: 30 TABLET | Refills: 0 | Status: SHIPPED | OUTPATIENT
Start: 2021-07-26 | End: 2022-03-07

## 2021-07-26 RX ADMIN — AMLODIPINE BESYLATE 10 MG: 10 TABLET ORAL at 08:53

## 2021-07-26 RX ADMIN — HYDROMORPHONE HYDROCHLORIDE 2 MG: 2 TABLET ORAL at 04:02

## 2021-07-26 RX ADMIN — EMPAGLIFLOZIN 25 MG: 25 TABLET, FILM COATED ORAL at 08:53

## 2021-07-26 RX ADMIN — HYDROMORPHONE HYDROCHLORIDE 2 MG: 2 TABLET ORAL at 08:54

## 2021-07-26 RX ADMIN — METFORMIN HYDROCHLORIDE 250 MG: 500 TABLET, FILM COATED ORAL at 08:42

## 2021-07-26 RX ADMIN — PANTOPRAZOLE SODIUM 40 MG: 20 TABLET, DELAYED RELEASE ORAL at 05:39

## 2021-07-26 RX ADMIN — NYSTATIN 500000 UNITS: 500000 SUSPENSION ORAL at 08:54

## 2021-07-26 RX ADMIN — GABAPENTIN 200 MG: 100 CAPSULE ORAL at 08:53

## 2021-07-26 RX ADMIN — LISINOPRIL 10 MG: 10 TABLET ORAL at 08:54

## 2021-07-26 RX ADMIN — LAMOTRIGINE 100 MG: 100 TABLET ORAL at 08:53

## 2021-07-26 RX ADMIN — PREDNISONE 6 MG: 1 TABLET ORAL at 08:53

## 2021-07-26 RX ADMIN — DIAZEPAM 0.5 MG: 5 SOLUTION ORAL at 08:54

## 2021-07-26 RX ADMIN — ACETAMINOPHEN 975 MG: 325 TABLET ORAL at 08:52

## 2021-07-26 RX ADMIN — LIDOCAINE 3 PATCH: 246 PATCH TOPICAL at 08:52

## 2021-07-26 RX ADMIN — Medication 12.5 MG: at 08:53

## 2021-07-26 RX ADMIN — GLIPIZIDE 20 MG: 10 TABLET ORAL at 07:31

## 2021-07-26 RX ADMIN — DULOXETINE HYDROCHLORIDE 60 MG: 60 CAPSULE, DELAYED RELEASE PELLETS ORAL at 08:54

## 2021-07-26 RX ADMIN — PIOGLITAZONE 15 MG: 15 TABLET ORAL at 08:52

## 2021-07-26 NOTE — PROGRESS NOTES
Care Management Discharge Note    Discharge Date: 07/26/2021       Discharge Disposition: Home with continued services      Discharge Services:  HC and PCA resumption     Discharge DME:  None     Discharge Transportation:  Son     Private pay costs discussed: Not applicable    Education Provided on the Discharge Plan:  yes  Persons Notified of Discharge Plans: yes  Patient/Family in Agreement with the Plan:  yes    Handoff Referral Completed: Yes    Additional Information:  PACO spoke with Ami, pt fill in Care Coordinator with [employer] and confirmed that CM requested for pt to have RN services added to her HC. Ami stated that pt Care Manager Jody in the community was informed of this and is working on coordinating services for pt.         Marci Alicea, GUS

## 2021-07-26 NOTE — PLAN OF CARE
Problem: Hypertension Acute  Goal: Blood Pressure Within Desired Range  Outcome: Improving     Pt is alert and oriented, voiding, and using the call light appropriately.    Pt is doing much better with her blood pressures.  Her meds were adjusted for both her blood pressure and pain control, and she is feeling much better. The plan is to hopefully go home tomorrow. We will continue to assess and monitor.

## 2021-07-26 NOTE — PLAN OF CARE
Physical Therapy Discharge Summary    Reason for therapy discharge:    Discharged to home with home therapy.    Progress towards therapy goal(s). See goals on Care Plan in [facility] electronic health record for goal details.  Goals partially met.  Barriers to achieving goals:   limited tolerance for therapy and discharge from facility.    Therapy recommendation(s):    Continued therapy is recommended.  Rationale/Recommendations:  inc pain limiting further mobility, poor endurance.    Autumn Quiles DPT  7/26/2021

## 2021-07-26 NOTE — PLAN OF CARE
Problem: Hyperglycemia  Goal: Blood Glucose Level Within Targeted Range  Outcome: Improving     Problem: Pain Acute  Goal: Acceptable Pain Control and Functional Ability  Outcome: Improving   Pt is alert and oriented, maybe slightly forgetful.  She has been snacking a bit tonight, so her blood glucose was a bit high.  Explained to patient why her night time sugar was so high.  Pt is also doing much better with her pain control.  Plan is for patient to return home tomorrow.  We will continue to assess and monitor.

## 2021-07-26 NOTE — PLAN OF CARE
Occupational Therapy Discharge Summary    Reason for therapy discharge:    Discharged to home with home therapy.    Progress towards therapy goal(s). See goals on Care Plan in [facility] electronic health record for goal details.  Goals partially met.  Barriers to achieving goals:   discharge from facility.    Therapy recommendation(s):    Continued therapy is recommended.  Rationale/Recommendations:  To assist Pt with indep with her trsfs and ADLs in her own environment.  Monitor cognition and safety at home.  Lena Rodriguez OTR/JEFRY  7/26/2021

## 2021-07-26 NOTE — PLAN OF CARE
Discharge education given to Son and pt. Questions answered. Prescriptions given. Questions answered. Discharged home with son.    Caitlin Luther RN

## 2021-07-27 ENCOUNTER — NURSE TRIAGE (OUTPATIENT)
Dept: NURSING | Facility: CLINIC | Age: 81
End: 2021-07-27

## 2021-07-27 ENCOUNTER — PATIENT OUTREACH (OUTPATIENT)
Dept: CARE COORDINATION | Facility: CLINIC | Age: 81
End: 2021-07-27

## 2021-07-27 DIAGNOSIS — Z71.89 OTHER SPECIFIED COUNSELING: ICD-10-CM

## 2021-07-27 NOTE — DISCHARGE SUMMARY
[facility] MEDICINE  DISCHARGE SUMMARY     Primary Care Physician: Ananya Mclean  Admission Date: 7/19/2021   Discharge Provider: Aron Bolanos MD Discharge Date: 7/27/2021   Diet: Diabetic diet   Code Status: Prior   Activity: DCACTIVITY: Activity as tolerated        Condition at Discharge: Good     REASON FOR PRESENTATION(See Admission Note for Details)     Intractable pain    PRINCIPAL & ACTIVE DISCHARGE DIAGNOSES       Hyperglycemia, poorly controlled DM2, now with improved sugars  More acutely related to recent steroid injections, chronic low-dose prednisone  A1c over 10, suggests very poor control at baseline  Tried insulin but pt unlikely to manage this independently  Trying to optimize oral options, restrict carbohydrate intake  Appreciate DM ed help, really a challenging situation  Increased glipizide dose, continue Jardiance, adding Actos  Ideally would find oral regimen for glucose control to prevent dehydration, readmission  Added low-dose Metformin yesterday 250 mg once a day and she seemed to tolerate it and blood sugar this morning 92     Acute on chronic pain, severe spinal stenosis  Describes worsening joint pains, hands, bilat shoulder, rt leg  Does see rheumatology as outpt, had recent injection in shoulder, hand  Not really sure if she has underlying rheum disease or not, past labs negative, will check ANCA which I don't see past  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 which may be associated with vasculitis, really doesn't seem like she has active vasculitis  Appreciate pain team help  Appreciate spine surgeon recommendations, really severe spinal stenosis, foraminal stenosis but not a good surgical candidate  Avoiding IV opiates  Says that she is typically on percocet at home  Also really helped by salonpas patches recently, icy hot ordered here  Seems she is rx'd mobic, lidocaine patch  Holding NSAIDs due to PAULINE  Ordered scheduled  tylenol, will change to scheduled Dilaudid in place of oxycodone  Patient feels IV Dilaudid was very helpful, will change to oral and see if she gets more benefit from this  Added gabapentin, vistaril  Psych added low dose valium tid for anxiety and to help with muscle spasms  Because patient complained a bit of being oversedated we will decrease her hydromorphone to 3 times daily with as needed doses.  This seemed to work well and will send her home on current regimen     Hypertension.   Blood pressures poorly controlled during the stay but now improved with the addition of chlorthalidone and doubling amlodipine and lisinopril.  Plan: Sent home with new blood pressure regimen     Confusion-resolved  Unclear etiology, may have been due to meds, dehydration  Not really confused now, probably has some mild underlying memory issues  Had elevated ammonia level, not encephalopathic now, no asterixis  No clear source of infection  Reported taking a lot of valium at home, not on  per pain team  Valium decreased to small dose 3 times daily for her anxiety and neck pain  May have been due to percocet  MRI neg other than incidental meningioma  Not a drinker  Appreciate therapy evals     Mouth pain, burning  Possibly related to thrush, started nystatin swish and spit  B12 level normal     Fatty liver  Noted on US abdomen  Recommend outpt follow up     PAULINE-resolved  Prerenal due to dehydration, hyperglycemia  Creat normalized, tolerating oral intake     Hearing loss, cochlear hydrops  Takes low dose prednisone chronically for this per pt.      Mental illness  Anxiety seems significant, son also concerned about this  Reported taking valium at home but not on  per pain team  Appreciate psychiatry eval  Continue home cymbalta, lamictal evening dose increased  Reportedly takes Depakote, this was not listed here and has not been given  Added scheduled valium tid      Disposition: Discharged home today       PENDING LABS      Unresulted Labs Ordered in the Past 30 Days of this Admission     No orders found from 6/19/2021 to 7/20/2021.            PROCEDURES ( this hospitalization only)      None    RECOMMENDATIONS TO OUTPATIENT PROVIDER FOR F/U VISIT     Follow-up Appointments     Follow-up and recommended labs and tests       Follow up with primary care provider, Ananya Mclean, within   7 days to evaluate medication change and for hospital follow- up.  The   following labs/tests are recommended: Basic metabolic panel, magnesium and   hemogram.             DISPOSITION     Home    SUMMARY OF HOSPITAL COURSE:     Presented with hyperglycemia, sugars over 400, possibly over 500. Recently had kenalog injections in rheum clinic. Also chronically on prednisone. She also says that she recently stopped metformin due to diarrhea. Says that she has been extremely thirsty, says she would drink the whole river if she could.      Hospital stay has been complicated by severe low back pain, radiating down right leg and worsening urinary incontinence. Trying to increase pain meds, starting insulin. MR lumbar spine with severe spinal stenosis. Spine surgery reviewed.  No indication for surgery at this time.  Pain team following.  Also very anxious, psychiatry consulted, started low-dose diazepam 3 times daily.     Sugars very difficult to control, poor candidate for insulin as she lives alone and won't be able to give herself insulin according to her and her son. Trying to optimize oral DM meds. DM educator following.  Had significant diarrhea from Metformin in the past.  Agreeable to lower dose Metformin.     Blood pressures have been difficult to control but improved today with the addition of chlorthalidone and increase in amlodipine and lisinopril yesterday.    Overall patient feels markedly improved today with reasonable pain control, improved glucose and blood pressure control and ready to home.    Discharge Medications with Med  changes:     Discharge Medication List as of 7/26/2021 11:25 AM      START taking these medications    Details   acetaminophen (TYLENOL) 325 MG tablet Take 3 tablets (975 mg) by mouth 3 times daily, No Print Out      amLODIPine (NORVASC) 10 MG tablet Take 1 tablet (10 mg) by mouth daily, Disp-30 tablet, R-0, E-Prescribe      chlorthalidone (HYGROTON) 25 MG tablet Take 0.5 tablets (12.5 mg) by mouth daily, Disp-15 tablet, R-0, E-Prescribe      diazepam (VALIUM) 1 MG/ML solution Take 0.5 mLs (0.5 mg) by mouth 3 times daily, Disp-10 mL, R-0, Local Print      gabapentin (NEURONTIN) 100 MG capsule Take 2 capsules (200 mg) by mouth 2 times daily, Disp-60 capsule, R-0, E-Prescribe      !! HYDROmorphone (DILAUDID) 2 MG tablet Take 1 tablet (2 mg) by mouth every 4 hours as needed for severe pain, Disp-10 tablet, R-0, Local Print      !! HYDROmorphone (DILAUDID) 2 MG tablet Take 1 tablet (2 mg) by mouth 3 times daily, Disp-30 tablet, R-0, Local Print      Lidocaine (LIDOCARE) 4 % Patch Place 3 patches onto the skin every 24 hours To prevent lidocaine toxicity, patient should be patch free for 12 hrs daily.Disp-90 patch, [id number]      lisinopril (ZESTRIL) 10 MG tablet Take 1 tablet (10 mg) by mouth daily, Disp-30 tablet, R-0, E-Prescribe      metFORMIN (GLUCOPHAGE) 500 MG tablet Take 0.5 tablets (250 mg) by mouth 2 times daily (with meals), Disp-15 tablet, R-0, E-Prescribe      methyl salicylate-menthol (ICY HOT) ointment Apply topically every 6 hours as needed (pain)Disp-99.2 g, [id number]      pioglitazone (ACTOS) 15 MG tablet Take 1 tablet (15 mg) by mouth daily, Disp-30 tablet, R-0, E-Prescribe       !! - Potential duplicate medications found. Please discuss with provider.      CONTINUE these medications which have CHANGED    Details   glipiZIDE (GLUCOTROL) 10 MG tablet Take 2 tablets (20 mg) by mouth 2 times daily (before meals), Disp-120 tablet, R-0, E-Prescribe         CONTINUE these medications which have NOT  CHANGED    Details   ammonium lactate (LAC-HYDRIN) 12 % external lotion Externally apply topically daily as neededHistorical      atorvastatin (LIPITOR) 10 MG tablet Take 10 mg by mouth At Bedtime, Historical      Barberry-Oreg Grape-Goldenseal (BERBERINE COMPLEX PO) Take 1 tablet by mouth daily, Historical      cyanocobalamin 1000 MCG SUBL Place 1,000 mcg under the tongue daily, Historical      DULoxetine (CYMBALTA) 60 MG EC capsule TAKE 1 CAPSULE (60 MG) BY MOUTH DAILY, Disp-90 capsule, R-1, E-PrescribeNeeds to be seen by March      fluconazole (DIFLUCAN) 150 MG tablet Take 150 mg by mouth See Admin Instructions One dose prn yeast infection, Historical      hydrOXYzine (ATARAX) 25 MG tablet Take 25 mg by mouth nightly as needed for anxiety , Historical      JARDIANCE 25 MG TABS tablet Take 25 mg by mouth daily , JAVED, Historical      lamoTRIgine (LAMICTAL) 100 MG tablet Take 100 mg by mouth 2 times daily, Historical      melatonin 1 MG TABS tablet Take 1 tablet (1 mg) by mouth nightly as needed for sleep, Disp-30 tablet, R-0, No Print Out      nystatin (MYCOSTATIN) 389199 UNIT/GM external powder Apply topically 2 times daily as needed Breast rashHistorical      omeprazole (PRILOSEC) 20 MG DR capsule Take 20 mg by mouth daily as needed, Historical      oxybutynin ER (DITROPAN-XL) 10 MG 24 hr tablet Take 10 mg by mouth At Bedtime , Historical      predniSONE (DELTASONE) 1 MG tablet Take 6 mg by mouth daily 1mg tab and 5mg tab, Historical      saccharomyces boulardii (FLORASTOR) 250 MG capsule Take 250 mg by mouth daily, Historical      !! triamcinolone (KENALOG) 0.1 % external cream Apply topically 2 times dailyHistorical      !! triamcinolone (KENALOG) 0.1 % external cream Apply topically 2 times daily as needed Historical      vitamin D3 (CHOLECALCIFEROL) 50 mcg (2000 units) tablet Take 3 tablets by mouth daily, Historical      zinc gluconate 50 MG tablet Take 50 mg by mouth daily, Historical      ACE/ARB NOT  PRESCRIBED, INTENTIONAL, 1 each continuous prn ACE & ARB not prescribed due to CKD (Chronic Kidney Disease), Historical      blood glucose (ACCU-CHEK FASTCLIX) lancing device FOR TESTING ONCE DAILY. DX  E11.9 TYPE 2 DIABETES, Historical      !! blood glucose (CONTOUR TEST) test strip TESTING EVERY DAY DX  E11.9, Historical      !! CONTOUR NEXT TEST test strip TESTING EVERY DAY DX  E11.9, JAVED, Historical      order for DME Equipment being ordered: wrist braceDisp-1 Device,R-0, Local Print       !! - Potential duplicate medications found. Please discuss with provider.      STOP taking these medications       diazepam (VALIUM) 2 MG tablet Comments:   Reason for Stopping:         oxyCODONE-acetaminophen (PERCOCET) 5-325 MG per tablet Comments:   Reason for Stopping:         triamterene-hydrochlorothiazide (DYAZIDE) 37.5-25 MG per capsule Comments:   Reason for Stopping:                     Rationale for medication changes:      See above for details        Consults       PAIN MANAGEMENT ADULT IP CONSULT  DIABETES EDUCATION IP CONSULT  PHYSICAL THERAPY ADULT IP CONSULT  OCCUPATIONAL THERAPY ADULT IP CONSULT  SOCIAL WORK IP CONSULT  SPIRITUAL HEALTH SERVICES IP CONSULT  PSYCHIATRY IP CONSULT  SPINE SURGERY ADULT IP CONSULT  PHARMACY IP CONSULT    Immunizations given this encounter     Most Recent Immunizations   Administered Date(s) Administered     COVID-19,PF,Pfizer 03/29/2021     Influenza (High Dose) 3 valent vaccine 10/01/2017     Influenza (IIV3) PF 10/08/2012     Pneumo Conj 13-V (2010&after) 02/18/2016     Pneumococcal 23 valent 10/02/2014     TDAP Vaccine (Adacel) 07/18/2013           Anticoagulation Information            SIGNIFICANT IMAGING FINDINGS     Results for orders placed or performed during the hospital encounter of 07/19/21   MR Brain COW Carotid wwo Contrast    Impression    IMPRESSION:  HEAD MRI:   1.  No recent infarct or evidence of recent or remote intracranial hemorrhage.  2.  8 mm presumed  meningioma along the right aspect of the interhemispheric falx without significant mass effect or evidence of associated edema.  3.  Mild volume loss and presumed chronic small vessel ischemic changes.    HEAD MRA:   1.  Normal MRA [place].    NECK MRA:  1.  Normal neck MRA.   US Abdomen Limited    Impression    IMPRESSION:  1.  Severe hepatic steatosis.  2.  Remaining exam unremarkable.       XR Chest Port 1 View    Impression    IMPRESSION: No pleural fluid or pneumothorax. No airspace disease or edema. Normal size of the heart.   MR Lumbar Spine w/o & w Contrast    Impression    IMPRESSION:  1.  Multilevel lumbar spondylosis.  2.  Severe spinal canal stenosis at L4-L5. Moderate spinal canal stenosis at L3-L4.  3.  Severe neural foraminal stenosis bilaterally at L5-S1.       SIGNIFICANT LABORATORY FINDINGS     Most Recent 3 CBC's:Recent Labs   Lab Test 07/26/21  1051 07/24/21  1234 07/23/21  0548   WBC 11.7* 10.1 12.1*   HGB 10.5* 10.9* 10.8*   MCV 85 88 86    301 320     Most Recent 3 BMP's:Recent Labs   Lab Test 07/26/21  1051 07/26/21  0732 07/25/21  2035 07/25/21  1049 07/24/21  1234     --   --  143 141   POTASSIUM 3.6  --   --  3.8 4.0   CHLORIDE 105  --   --  109* 108*   CO2 27  --   --  28 26   BUN 19  --   --  20 19   CR 0.77  --   --  0.78 0.81   ANIONGAP 10  --   --  6 7   MACY 9.4  --   --  9.1 9.0   * 98 281* 171* 235*         Discharge Orders        Reason for your hospital stay    Patient admitted for intractable back and leg pain and also had acute kidney injury.  Also hyperglycemia and poor blood pressure control.     Follow-up and recommended labs and tests     Follow up with primary care provider, Ananya Mclean, within 7 days to evaluate medication change and for hospital follow- up.  The following labs/tests are recommended: Basic metabolic panel, magnesium and hemogram.     Activity    Your activity upon discharge: activity as tolerated     MD face to  face encounter    Documentation of Face to Face and Certification for Home Health Services    I certify that patient: Tonya Yang is under my care and that I, or a nurse practitioner or physician's assistant working with me, had a face-to-face encounter that meets the physician face-to-face encounter requirements with this patient on: 7/26/2021.    This encounter with the patient was in whole, or in part, for the following medical condition, which is the primary reason for home health care: Patient has chronic pain issues but intractable pain in back and leg led to hospitalization.  Had acute kidney injury with hyperglycemia and poorly controlled blood pressures.  Now much improved..    I certify that, based on my findings, the following services are medically necessary home health services: Nursing, Occupational Therapy, and Physical Therapy.    My clinical findings support the need for the above services because: Nurse is needed: To assess significant changes in blood pressure medicine and diabetic medicines and new pain medicines after changes in medications or other medical regimen..    Further, I certify that my clinical findings support that this patient is homebound (i.e. absences from home require considerable and taxing effort and are for medical reasons or [employer] services or infrequently or of short duration when for other reasons) because: Leaving home is medically contraindicated for the following reason(s): Other physician ordered restriction: Limited mobility because of back and leg pain...    Based on the above findings. I certify that this patient is confined to the home and needs intermittent skilled nursing care, physical therapy and/or speech therapy.  The patient is under my care, and I have initiated the establishment of the plan of care.  This patient will be followed by a physician who will periodically review the plan of care.  Physician/Provider to provide follow up care: Vinay  Ananya Reardon    Attending hospital physician (the Medicare certified PECOS provider): Aron Bolanos MD  Physician Signature: See electronic signature associated with these discharge orders.  Date: 7/26/2021     Walker Order    DME Documentation:   Describe the reason for need to support medical necessity: gait instability, decreased activity tolerance.     I, the undersigned, certify that the above prescribed supplies are medically necessary for this patient and is both reasonable and necessary in reference to accepted standards of medical and necessary in reference to accepted standards of medical practice in the treatment of this patient's condition and is not prescribed as a convenience.     Diet    Follow this diet upon discharge: Diabetic (1800 ADA)       Examination   Physical Exam      Wt Readings from Last 1 Encounters:   07/20/21 84.6 kg (186 lb 9.6 oz)       General Appearance: Alert, no apparent distress  Respiratory: Lungs are clear throughout  Cardiovascular: Regular rate and rhythm without murmur  GI: Abdomen is soft nontender  Skin: No obvious abnormalities in skin exposed areas  Other: Is alert, cranial nerves II through XII intact, moves all extremities well      Please see EMR for more detailed significant labs, imaging, consultant notes etc.    I, Aron Bolanos MD, personally saw the patient today and spent greater than 30 minutes discharging this patient.    Aron Bolanos MD  [facility]    CC:Ananya Mclean

## 2021-07-27 NOTE — PROGRESS NOTES
Clinic Care Coordination Contact  Community Health Worker Initial Outreach            Patient accepts CC: contacted patient through automated service for [facility] patient, and it just rang, and went blank with no reply after I spoke.    Sonia WINTERS Community Health Worker  Clinic Care Coordination  [facility]  Phone: [phone]

## 2021-07-28 NOTE — TELEPHONE ENCOUNTER
"Pt calling to discuss worsening leg swelling.  Pain is \"getting back again\" today.  Difficult to triage and understand at times.  RN asking questions, pt talking over this RN multiple times.      Triage disposition:  See a provider within 4 hours OR PCP triage.  Since PCP is not with [facility], RN advised ED since [facility] is closed.      Patient verbalized understanding and had no further questions.  Pt plans to call her friend to discuss.     COVID 19 Nurse Triage Plan/Patient Instructions    Please be aware that novel coronavirus (COVID-19) may be circulating in the community. If you develop symptoms such as fever, cough, or SOB or if you have concerns about the presence of another infection including coronavirus (COVID-19), please contact your health care provider or visit [email or web address].     Disposition/Instructions    ED Visit recommended. Follow protocol based instructions.     Bring Your Own Device:  Please also bring your smart device(s) (smart phones, tablets, laptops) and their charging cables for your personal use and to communicate with your care team during your visit.    Thank you for taking steps to prevent the spread of this virus.  o Limit your contact with others.  o Wear a simple mask to cover your cough.  o Wash your hands well and often.    Resources    [facility]: About COVID-19: [email or web address]    CDC: What to Do If You're Sick: www.cdc.gov/coronavirus/2019-ncov/about/steps-when-sick.html    CDC: Ending Home Isolation: www.cdc.gov/coronavirus/2019-ncov/hcp/disposition-in-home-patients.html     CDC: Caring for Someone: www.cdc.gov/coronavirus/2019-ncov/if-you-are-sick/care-for-someone.html     [facility]: Interim Guidance for Hospital Discharge to Home: www.health.[facility].[place].us/diseases/coronavirus/hcp/hospdischarge.pdf    [facility] clinical trials (COVID-19 research studies): [email or web address]     Below are the COVID-19 hotlines at " the [place] Department of Health ([facility]). Interpreters are available.   o For health questions: Call 129-731-6401 or 1-765.225.3069 (7 a.m. to 7 p.m.)  o For questions about schools and childcare: Call 406-093-0233 or 1-536.909.1395 (7 a.m. to 7 p.m.)                 Eun Pitts RN/AMANDO      Reason for Disposition    SEVERE leg swelling (e.g., swelling extends above knee, entire leg is swollen, weeping fluid)    [1] Thigh, calf, or ankle swelling AND [2] bilateral AND [3] 1 side is more swollen    Additional Information    Negative: Severe difficulty breathing (e.g., struggling for each breath, speaks in single words)    Negative: Looks like a broken bone or dislocated joint (e.g., crooked or deformed)    Negative: Sounds like a life-threatening emergency to the triager    Negative: Difficulty breathing at rest    Negative: Entire foot is cool or blue in comparison to other side    Negative: [1] Can't walk or can barely walk AND [2] new onset    Negative: [1] Difficulty breathing with exertion (e.g., walking) AND [2] new onset or worsening    Negative: [1] Red area or streak AND [2] fever    Negative: [1] Swelling is painful to touch AND [2] fever    Negative: [1] Cast on leg or ankle AND [2] now increased pain    Negative: Patient sounds very sick or weak to the triager    Protocols used: LEG SWELLING AND EDEMA-A-AH

## 2021-07-28 NOTE — PROGRESS NOTES
Clinic Care Coordination Contact  Community Health Worker Initial Outreach            Patient accepts CC: Called Service phone for patient is hard of hearing but no answer. CTA will close referral at this time.

## 2021-09-07 ENCOUNTER — APPOINTMENT (OUTPATIENT)
Dept: RADIOLOGY | Facility: CLINIC | Age: 81
End: 2021-09-07
Payer: MEDICARE

## 2021-09-07 ENCOUNTER — HOSPITAL ENCOUNTER (EMERGENCY)
Facility: CLINIC | Age: 81
Discharge: HOME | End: 2021-09-08
Admitting: EMERGENCY MEDICINE
Payer: MEDICARE

## 2021-09-07 ENCOUNTER — APPOINTMENT (OUTPATIENT)
Dept: MRI IMAGING | Facility: CLINIC | Age: 81
End: 2021-09-07
Payer: MEDICARE

## 2021-09-07 DIAGNOSIS — M48.02 CERVICAL STENOSIS OF SPINAL CANAL: ICD-10-CM

## 2021-09-07 DIAGNOSIS — M50.20 CERVICAL HERNIATED DISC: ICD-10-CM

## 2021-09-07 LAB
ANION GAP SERPL CALCULATED.3IONS-SCNC: 14 MMOL/L (ref 5–18)
ATRIAL RATE - MUSE: 78 BPM
BUN SERPL-MCNC: 15 MG/DL (ref 8–28)
CALCIUM SERPL-MCNC: 10.2 MG/DL (ref 8.5–10.5)
CHLORIDE BLD-SCNC: 104 MMOL/L (ref 98–107)
CO2 SERPL-SCNC: 25 MMOL/L (ref 22–31)
CREAT SERPL-MCNC: 0.84 MG/DL (ref 0.6–1.1)
DIASTOLIC BLOOD PRESSURE - MUSE: 68 MMHG
ERYTHROCYTE [DISTWIDTH] IN BLOOD BY AUTOMATED COUNT: 16.2 % (ref 10–15)
GFR SERPL CREATININE-BSD FRML MDRD: 67 ML/MIN/1.73M2
GLUCOSE BLD-MCNC: 61 MG/DL (ref 70–125)
GLUCOSE BLDC GLUCOMTR-MCNC: 128 MG/DL (ref 70–125)
GLUCOSE BLDC GLUCOMTR-MCNC: 81 MG/DL (ref 70–125)
HCT VFR BLD AUTO: 36.3 % (ref 35–47)
HGB BLD-MCNC: 10.4 G/DL (ref 11.7–15.7)
INTERPRETATION ECG - MUSE: NORMAL
MCH RBC QN AUTO: 24.8 PG (ref 26.5–33)
MCHC RBC AUTO-ENTMCNC: 28.7 G/DL (ref 31.5–36.5)
MCV RBC AUTO: 86 FL (ref 78–100)
P AXIS - MUSE: 18 DEGREES
PLATELET # BLD AUTO: 314 10E3/UL (ref 150–450)
POTASSIUM BLD-SCNC: 4.2 MMOL/L (ref 3.5–5)
PR INTERVAL - MUSE: 144 MS
QRS DURATION - MUSE: 150 MS
QT - MUSE: 448 MS
QTC - MUSE: 510 MS
R AXIS - MUSE: 31 DEGREES
RBC # BLD AUTO: 4.2 10E6/UL (ref 3.8–5.2)
SODIUM SERPL-SCNC: 143 MMOL/L (ref 136–145)
SYSTOLIC BLOOD PRESSURE - MUSE: 141 MMHG
T AXIS - MUSE: -11 DEGREES
TROPONIN I SERPL-MCNC: 0.02 NG/ML (ref 0–0.29)
VENTRICULAR RATE- MUSE: 78 BPM
WBC # BLD AUTO: 8.4 10E3/UL (ref 4–11)

## 2021-09-07 PROCEDURE — 36415 COLL VENOUS BLD VENIPUNCTURE: CPT | Performed by: PHYSICIAN ASSISTANT

## 2021-09-07 PROCEDURE — 93005 ELECTROCARDIOGRAM TRACING: CPT

## 2021-09-07 PROCEDURE — 72141 MRI NECK SPINE W/O DYE: CPT

## 2021-09-07 PROCEDURE — 250N000011 HC RX IP 250 OP 636: Performed by: PHYSICIAN ASSISTANT

## 2021-09-07 PROCEDURE — 85027 COMPLETE CBC AUTOMATED: CPT | Performed by: PHYSICIAN ASSISTANT

## 2021-09-07 PROCEDURE — 71045 X-RAY EXAM CHEST 1 VIEW: CPT

## 2021-09-07 PROCEDURE — 80048 BASIC METABOLIC PNL TOTAL CA: CPT | Performed by: PHYSICIAN ASSISTANT

## 2021-09-07 PROCEDURE — 250N000013 HC RX MED GY IP 250 OP 250 PS 637: Performed by: PHYSICIAN ASSISTANT

## 2021-09-07 PROCEDURE — 99285 EMERGENCY DEPT VISIT HI MDM: CPT | Mod: 25

## 2021-09-07 PROCEDURE — 93005 ELECTROCARDIOGRAM TRACING: CPT | Performed by: PHYSICIAN ASSISTANT

## 2021-09-07 PROCEDURE — 84484 ASSAY OF TROPONIN QUANT: CPT | Performed by: PHYSICIAN ASSISTANT

## 2021-09-07 RX ORDER — HYDROMORPHONE HYDROCHLORIDE 2 MG/1
2 TABLET ORAL ONCE
Status: COMPLETED | OUTPATIENT
Start: 2021-09-07 | End: 2021-09-07

## 2021-09-07 RX ORDER — FAMOTIDINE 10 MG
10 TABLET ORAL ONCE
Status: DISCONTINUED | OUTPATIENT
Start: 2021-09-07 | End: 2021-09-08 | Stop reason: HOSPADM

## 2021-09-07 RX ORDER — METOCLOPRAMIDE 10 MG/1
10 TABLET ORAL ONCE
Status: DISCONTINUED | OUTPATIENT
Start: 2021-09-07 | End: 2021-09-08 | Stop reason: HOSPADM

## 2021-09-07 RX ORDER — ONDANSETRON 4 MG/1
4 TABLET, ORALLY DISINTEGRATING ORAL ONCE
Status: COMPLETED | OUTPATIENT
Start: 2021-09-07 | End: 2021-09-07

## 2021-09-07 RX ADMIN — HYDROMORPHONE HYDROCHLORIDE 2 MG: 2 TABLET ORAL at 18:42

## 2021-09-07 RX ADMIN — ONDANSETRON 4 MG: 4 TABLET, ORALLY DISINTEGRATING ORAL at 18:44

## 2021-09-07 ASSESSMENT — ENCOUNTER SYMPTOMS
NAUSEA: 1
VOMITING: 0

## 2021-09-07 NOTE — ED PROVIDER NOTES
EMERGENCY DEPARTMENT ENCOUNTER      NAME: Tonya Yang  AGE: 80 year old female  YOB: 1940  MRN: [id number]  EVALUATION DATE & TIME: 9/7/2021  6:04 PM    PCP: Ananya Mclean    ED PROVIDER: Emani Brandt PA-C      Chief Complaint   Patient presents with     NEUROPATHY         FINAL IMPRESSION:  1. Cervical stenosis of spinal canal          MEDICAL DECISION MAKING:    Pertinent Labs & Imaging studies reviewed. (See chart for details)  80 year old female with known severe lumbar spine stenosis presents to the Emergency Department for evaluation of new onset bilateral arm pain. Pain started in her right forearm 3 days ago and has progressed to her bilateral arms with shooting pains. She has tingling in her bilateral hands and feet, feet are baseline for her, however hands are new. She is prescribed percocet at home for chronic pain however she reports this isn't even touching the pain she is experiencing now. She has not been able to sleep secondary to the pain. She reports baseline lumbar radiculopathy that is unchanged since she was admitted for such back in July. She follows with Dr. Salas with [facility] and was deemed not a surgical candidate back in July when MRI showed severe stenosis. She denies any bowel/bladder dysfunction. She denies any associated weakness or sensory loss.     Vitals reviewed and unremarkable. Patient is in no acute distress and non-toxic appearing. On exam, she has no reproducible tenderness to palpation of her arms. She has full ROM without difficulty. No midline cervical spine tenderness. Full ROM of neck. No associated weakness, 5/5 strength of bilateral upper and lower extremities and gross sensation intact. DTRs 2+ and symmetric. Differential diagnosis includes but not limited to cervical radiculopathy, cervical cord compression, cervical discogenic pain, malignancy, discitis, epidural abscess, transverse myelitis.    With her complaint of chest pain a  few days ago, did do some basic labs, troponin, EKG, chest xray. Troponin not elevated. EKG with RBBB and prolonged QTc similar to previous. No acute ST elevations or depressions. CXR unremarkable. She has not had chest pain for 1-2 days and thus delta troponin not indicated with very low suspicion for ACS. Given her history of severe lumbar stenosis and new onset severe radicular symptoms, did proceed with MRI cervical spine. At C7-T1, there is severe central canal stenosis secondary to moderate-sized central disc extrusion which compresses the cord in addition to moderate to severe central canal stenosis from C4-C6 with severe neuro foraminal stenosis as well bilaterally. I discussed with [facility] Dr. Draper. Given she has no weakness or loss of sensation he does feel she can be discharged home if pain under control and can be seen in clinic tomorrow or Thursday. She did note improvement with oral dilaudid here in ED. I have reviewed  database and she is currently on percocet 5 mg 4 times per day and gabapentin. Will prescribe her #5 tablets of dilaudid 2 mg tablets to get her by until she has follow up with [facility]. She was up and ambulating with her cane. Able to get dressed. We discussed return precautions and patient and her son felt comfortable discharging home. Patient discharged in stable condition.      0 minutes of critical care time     ED COURSE:  6:35 PM I met with the patient, obtained history, performed an initial exam, and discussed options and plan for diagnostics and treatment here in the ED.  9:52 PM Checked in on and updated patient.   11:37 PM Spoke with Dr. Draper, [facility]. Agrees patient is appropriate for discharge and patient can be seen in clinic in the next 1-2 days.   11:51 PM Checked in on and updated patient. Will give her another dose of pain medication. Called and spoke with her son over the phone. He will come and pick her up.   12:02 AM Checked in on  and updated patient. I discussed the plan for discharge with the patient, and patient is agreeable.  We discussed supportive cares at home and reasons for return to the ER including new or worsening symptoms - all questions and concerns addressed.  Patient to be discharged by RN.    At the conclusion of the encounter I discussed the results of all of the tests and the disposition. The questions were answered. The patient and family acknowledged understanding and were agreeable with the care plan.     MEDICATIONS GIVEN IN THE EMERGENCY:  Medications   metoclopramide (REGLAN) tablet 10 mg (10 mg Oral Not Given 9/7/21 2306)   famotidine (PEPCID) tablet 10 mg (10 mg Oral Not Given 9/7/21 2306)   lidocaine (XYLOCAINE) 2 % 15 mL, alum & mag hydroxide-simethicone (MAALOX) 15 mL GI Cocktail (30 mLs Oral Not Given 9/7/21 2307)   HYDROmorphone (DILAUDID) tablet 2 mg (2 mg Oral Given 9/7/21 1842)   ondansetron (ZOFRAN-ODT) ODT tab 4 mg (4 mg Oral Given 9/7/21 1844)       NEW PRESCRIPTIONS STARTED AT TODAY'S ER VISIT  New Prescriptions    No medications on file            =================================================================    HPI:    Patient information was obtained from: patient    Use of Interpretor: N/A      Tonya Yang is a 80 year old female with a pertinent history of hypertension, hyperlipidemia, COPD, Diabetes Mellitus, GERD, CKD, spinal stenosis, neuropathy, and arthritis who presents to this ED by walk in for evaluation of neuropathy.    Per chart review on 7/19/21 patient presented to the ED with right leg pain and longstanding history of spinal stenosis. Her visit was prompted by loss of bowel and bladder control. Dr. Salas from [facility] evaluated patient during admission. Assessment showed severe but not critical stenosis at L4-5 with L5 radiculopathy. Patient not a good surgical candidate. Avoiding IV opiates. Oral dilaudid did help her pain significantly.     Since 9/4/21 (three days  ago) patient has felt pain in her right forearm, but now the pain has progressed to both arms from shoulder to wrist with tingling in her fingers. Last night (9/6/21), patient experienced shooting, persistent pain in her arms keeping her awake. She tried taking Tylenol and icing and heating the area with no relief. She has not appreciated any associated weakness or loss of sensation. She is ambulatory with a cane at baseline. Patient also experienced intermittent chest pain in the middle of her chest 4 days ago which has since gone away. No exertional component or associated shortness of breath or diaphoresis. Patient has experienced nausea which she attributes to the intense amount of pain she is in. She reports her typical amount of pain throughout her lower back radiating into her legs due to her spinal stenosis is unchanged. She reports no issues with bowel/bladder incontinence since she was discharged from hospital in July.     Due to her history of spinal stenosis and neuropathy, patient has oxycodone prescribed by her Nurse Practitioner regularly for pain management although did not take her dose today. She did take Tylenol around 4 PM and her gabapentin this morning.     Patient denies fever, chills, or vomiting.     REVIEW OF SYSTEMS:  Review of Systems   Constitutional: Negative for chills and fever.   Respiratory: Negative for shortness of breath.    Cardiovascular: Positive for chest pain (resolved) and leg swelling (baseline).   Gastrointestinal: Positive for nausea. Negative for abdominal pain, diarrhea and vomiting.   Musculoskeletal:        Positive for bilateral arm pain   Skin: Negative for color change.   Neurological: Positive for numbness (tingling in her hands and feet). Negative for dizziness, weakness, light-headedness and headaches.        Negative for bowel or bladder incontinence   All other systems reviewed and are negative.       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     Hepatic abscess      Hyperlipidemia      Hypertension      Irritable bowel syndrome      Meniere disease      Neuropathy      Noninfectious ileitis      Peritoneal abscess (H)      Spinal stenosis of lumbar region      Steroid long-term use      Vaginitis, atrophic, postmenopausal      Vitamin D deficiency        PAST SURGICAL HISTORY:  Past Surgical History:   Procedure Laterality Date     CATARACT IOL, RT/LT Left 7/2013     FOOT SURGERY      toe     GI SURGERY       LAPAROSCOPIC HEPATECTOMY PARTIAL  11/4/2013    Procedure: LAPAROSCOPIC HEPATECTOMY PARTIAL;  Laparoscopic Debridement of Liver Abcess;  Surgeon: Sepideh Green MD;  Location: [facility] OR     ORTHOPEDIC SURGERY       PICC INSERTION  8/28/2013    5fr DL Power PICC, 41cm (1cm external length) in the R lateral brachial vein with tip in the SVC RA junction.     RECTAL SURGERY      1970s     VASCULAR SURGERY             CURRENT MEDICATIONS:      Current Facility-Administered Medications:      famotidine (PEPCID) tablet 10 mg, 10 mg, Oral, Once, Emani Brandt PA-C     lidocaine (XYLOCAINE) 2 % 15 mL, alum & mag hydroxide-simethicone (MAALOX) 15 mL GI Cocktail, 30 mL, Oral, Once, Emani Brandt PA-C     metoclopramide (REGLAN) tablet 10 mg, 10 mg, Oral, Once, Emani Brandt PA-C    Current Outpatient Medications:      ACE/ARB NOT PRESCRIBED, INTENTIONAL,, 1 each continuous prn ACE & ARB not prescribed due to CKD (Chronic Kidney Disease), Disp: , Rfl:      acetaminophen (TYLENOL) 325 MG tablet, Take 3 tablets (975 mg) by mouth 3 times daily, Disp: , Rfl:       amLODIPine (NORVASC) 10 MG tablet, Take 1 tablet (10 mg) by mouth daily, Disp: 30 tablet, Rfl: 0     ammonium lactate (LAC-HYDRIN) 12 % external lotion, Externally apply topically daily as needed, Disp: , Rfl:      atorvastatin (LIPITOR) 10 MG tablet, Take 10 mg by mouth At Bedtime, Disp: , Rfl:      Barberry-Oreg Grape-Goldenseal (BERBERINE COMPLEX PO), Take 1 tablet by mouth daily, Disp: , Rfl:      blood glucose (ACCU-CHEK FASTCLIX) lancing device, FOR TESTING ONCE DAILY. DX  E11.9 TYPE 2 DIABETES, Disp: , Rfl:      blood glucose (CONTOUR TEST) test strip, TESTING EVERY DAY DX  E11.9, Disp: , Rfl:      chlorthalidone (HYGROTON) 25 MG tablet, Take 0.5 tablets (12.5 mg) by mouth daily, Disp: 15 tablet, Rfl: 0     CONTOUR NEXT TEST test strip, TESTING EVERY DAY DX  E11.9, Disp: , Rfl:      cyanocobalamin 1000 MCG SUBL, Place 1,000 mcg under the tongue daily, Disp: , Rfl:      diazepam (VALIUM) 1 MG/ML solution, Take 0.5 mLs (0.5 mg) by mouth 3 times daily, Disp: 10 mL, Rfl: 0     DULoxetine (CYMBALTA) 60 MG EC capsule, TAKE 1 CAPSULE (60 MG) BY MOUTH DAILY, Disp: 90 capsule, Rfl: 1     fluconazole (DIFLUCAN) 150 MG tablet, Take 150 mg by mouth See Admin Instructions One dose prn yeast infection, Disp: , Rfl:      gabapentin (NEURONTIN) 100 MG capsule, Take 2 capsules (200 mg) by mouth 2 times daily, Disp: 60 capsule, Rfl: 0     glipiZIDE (GLUCOTROL) 10 MG tablet, Take 2 tablets (20 mg) by mouth 2 times daily (before meals), Disp: 120 tablet, Rfl: 0     HYDROmorphone (DILAUDID) 2 MG tablet, Take 1 tablet (2 mg) by mouth every 4 hours as needed for severe pain, Disp: 10 tablet, Rfl: 0     HYDROmorphone (DILAUDID) 2 MG tablet, Take 1 tablet (2 mg) by mouth 3 times daily, Disp: 30 tablet, Rfl: 0     hydrOXYzine (ATARAX) 25 MG tablet, Take 25 mg by mouth nightly as needed for anxiety , Disp: , Rfl:      JARDIANCE 25 MG TABS tablet, Take 25 mg by mouth daily , Disp: , Rfl:      lamoTRIgine (LAMICTAL) 100 MG tablet,  Take 100 mg by mouth 2 times daily, Disp: , Rfl:      Lidocaine (LIDOCARE) 4 % Patch, Place 3 patches onto the skin every 24 hours To prevent lidocaine toxicity, patient should be patch free for 12 hrs daily., Disp: 90 patch, Rfl: 0     lisinopril (ZESTRIL) 10 MG tablet, Take 1 tablet (10 mg) by mouth daily, Disp: 30 tablet, Rfl: 0     melatonin 1 MG TABS tablet, Take 1 tablet (1 mg) by mouth nightly as needed for sleep, Disp: 30 tablet, Rfl: 0     metFORMIN (GLUCOPHAGE) 500 MG tablet, Take 0.5 tablets (250 mg) by mouth 2 times daily (with meals), Disp: 15 tablet, Rfl: 0     methyl salicylate-menthol (ICY HOT) ointment, Apply topically every 6 hours as needed (pain), Disp: 99.2 g, Rfl: 0     nystatin (MYCOSTATIN) 591266 UNIT/GM external powder, Apply topically 2 times daily as needed Breast rash, Disp: , Rfl:      omeprazole (PRILOSEC) 20 MG DR capsule, Take 20 mg by mouth daily as needed, Disp: , Rfl:      order for DME, Equipment being ordered: wrist brace, Disp: 1 Device, Rfl: 0     oxybutynin ER (DITROPAN-XL) 10 MG 24 hr tablet, Take 10 mg by mouth At Bedtime , Disp: , Rfl:      pioglitazone (ACTOS) 15 MG tablet, Take 1 tablet (15 mg) by mouth daily, Disp: 30 tablet, Rfl: 0     predniSONE (DELTASONE) 1 MG tablet, Take 6 mg by mouth daily 1mg tab and 5mg tab, Disp: , Rfl:      saccharomyces boulardii (FLORASTOR) 250 MG capsule, Take 250 mg by mouth daily, Disp: , Rfl:      triamcinolone (KENALOG) 0.1 % external cream, Apply topically 2 times daily, Disp: , Rfl:      triamcinolone (KENALOG) 0.1 % external cream, Apply topically 2 times daily as needed , Disp: , Rfl:      vitamin D3 (CHOLECALCIFEROL) 50 mcg (2000 units) tablet, Take 3 tablets by mouth daily, Disp: , Rfl:      zinc gluconate 50 MG tablet, Take 50 mg by mouth daily, Disp: , Rfl:       ALLERGIES:  Allergies   Allergen Reactions     Propofol Nausea and Vomiting     Shellfish Allergy      Other reaction(s): Dizziness       FAMILY HISTORY:  Family  History   Problem Relation Age of Onset     Cerebrovascular Disease Mother      Heart Disease Mother      Heart Disease Father      Heart Disease Brother      Diabetes No family hx of      Coronary Artery Disease No family hx of      Hypertension No family hx of      Hyperlipidemia No family hx of      Breast Cancer No family hx of      Colon Cancer No family hx of      Prostate Cancer No family hx of      Other Cancer No family hx of      Depression No family hx of      Anxiety Disorder No family hx of      Mental Illness No family hx of      Substance Abuse No family hx of      Anesthesia Reaction No family hx of      Asthma No family hx of      Osteoporosis No family hx of      Genetic Disorder No family hx of      Thyroid Disease No family hx of      Obesity No family hx of      Unknown/Adopted No family hx of        SOCIAL HISTORY:   Social History     Socioeconomic History     Marital status:      Spouse name: Not on file     Number of children: Not on file     Years of education: Not on file     Highest education level: Not on file   Occupational History     Not on file   Tobacco Use     Smoking status: Former Smoker     Types: Cigarettes     Quit date: 11/15/1987     Years since quittin.8     Smokeless tobacco: Former User   Substance and Sexual Activity     Alcohol use: Not Currently     Alcohol/week: 0.0 standard drinks     Comment: MALISSA white since      Drug use: No     Comment: used marijuanna in the past     Sexual activity: Never     Partners: Male   Other Topics Concern     Parent/sibling w/ CABG, MI or angioplasty before 65F 55M? No   Social History Narrative     Not on file     Social Determinants of Health     Financial Resource Strain:      Difficulty of Paying Living Expenses:    Food Insecurity:      Worried About Running Out of Food in the Last Year:      Ran Out of Food in the Last Year:    Transportation Needs:      Lack of Transportation (Medical):      Lack of  Transportation (Non-Medical):    Physical Activity:      Days of Exercise per Week:      Minutes of Exercise per Session:    Stress:      Feeling of Stress :    Social Connections:      Frequency of Communication with Friends and Family:      Frequency of Social Gatherings with Friends and Family:      Attends Mandaeism Services:      Active Member of Clubs or Organizations:      Attends Club or Organization Meetings:      Marital Status:    Intimate Partner Violence:      Fear of Current or Ex-Partner:      Emotionally Abused:      Physically Abused:      Sexually Abused:        VITALS:  Patient Vitals for the past 24 hrs:   BP Temp Temp src Pulse Resp SpO2 Weight   09/07/21 2115 (!) 147/86 -- -- 84 18 96 % --   09/07/21 2045 (!) 144/65 -- -- 81 -- -- --   09/07/21 2030 (!) 157/65 -- -- 87 20 96 % --   09/07/21 1930 131/63 -- -- 77 20 96 % --   09/07/21 1845 (!) 142/66 -- -- 80 -- 95 % --   09/07/21 1534 (!) 142/68 98.7  F (37.1  C) Oral 87 16 98 % 84.4 kg (186 lb)       PHYSICAL EXAM    Constitutional: Well developed, Well nourished, NAD, obese  HENT: Normocephalic, Atraumatic, Bilateral external ears normal, Oropharynx normal, mucous membranes moist, Nose normal.   Neck: Normal range of motion, No midline cervical spine tenderness, Supple, No stridor.    Eyes: PERRL, EOMI, Conjunctiva normal, No discharge.   Respiratory: Normal breath sounds, No respiratory distress, No wheezing, Speaks full sentences easily. No cough.    Cardiovascular: Normal heart rate, Regular rhythm, No murmurs, No rubs, No gallops. Chest wall nontender.    GI: Soft, No tenderness, No masses, No flank tenderness. No rebound or guarding.    Musculoskeletal: 2+ DP and radial pulses bilaterally. 1+ pitting edema bilaterally in lower legs. No cyanosis, No clubbing. Good range of motion in all major joints. No reproducible tenderness to palpation or major deformities noted. No midline tenderness of the CTLS spine.   Integument: Warm, Dry, No  erythema, No rash. No petechiae.  Neurological: Patient is alert and oriented ×3.  Face is symmetric.  Speech is normal.  Visual fields are full.  Cranial nerves II through XII are intact.  Strength is full and equal in both upper and lower extremities.  Sensory is intact to sharp and light touch.  Patient has a normal steady gait with cane.  Coordination is intact.  Normal Romberg and finger nose to finger.  Reflexes are 2+ throughout.  Psychiatric: Affect normal, Judgment normal, Mood normal. Cooperative.       LAB:  All pertinent labs reviewed and interpreted.  Recent Results (from the past 24 hour(s))   CBC (+ platelets, no diff)    Collection Time: 09/07/21  6:50 PM   Result Value Ref Range    WBC Count 8.4 4.0 - 11.0 10e3/uL    RBC Count 4.20 3.80 - 5.20 10e6/uL    Hemoglobin 10.4 (L) 11.7 - 15.7 g/dL    Hematocrit 36.3 35.0 - 47.0 %    MCV 86 78 - 100 fL    MCH 24.8 (L) 26.5 - 33.0 pg    MCHC 28.7 (L) 31.5 - 36.5 g/dL    RDW 16.2 (H) 10.0 - 15.0 %    Platelet Count 314 150 - 450 10e3/uL   Basic metabolic panel    Collection Time: 09/07/21  6:50 PM   Result Value Ref Range    Sodium 143 136 - 145 mmol/L    Potassium 4.2 3.5 - 5.0 mmol/L    Chloride 104 98 - 107 mmol/L    Carbon Dioxide (CO2) 25 22 - 31 mmol/L    Anion Gap 14 5 - 18 mmol/L    Urea Nitrogen 15 8 - 28 mg/dL    Creatinine 0.84 0.60 - 1.10 mg/dL    Calcium 10.2 8.5 - 10.5 mg/dL    Glucose 61 (L) 70 - 125 mg/dL    GFR Estimate 67 >60 mL/min/1.73m2   Troponin I (now)    Collection Time: 09/07/21  6:50 PM   Result Value Ref Range    Troponin I 0.02 0.00 - 0.29 ng/mL   ECG 12-LEAD WITH MUSE (L[facility])    Collection Time: 09/07/21  7:00 PM   Result Value Ref Range    Systolic Blood Pressure 141 mmHg    Diastolic Blood Pressure 68 mmHg    Ventricular Rate 78 BPM    Atrial Rate 78 BPM    ND Interval 144 ms    QRS Duration 150 ms     ms    QTc 510 ms    P Axis 18 degrees    R AXIS 31 degrees    T Axis -11 degrees    Interpretation ECG       Sinus  rhythm  Right bundle branch block  Abnormal ECG  No previous ECGs available  Confirmed by SEE ED PROVIDER NOTE FOR, ECG INTERPRETATION ([id number]),  SOPHIA QUINTANILLA ([id number]) on 9/7/2021 7:02:50 PM     Glucose by meter    Collection Time: 09/07/21  8:22 PM   Result Value Ref Range    GLUCOSE BY METER POCT 81 70 - 125 mg/dL   Glucose by meter    Collection Time: 09/07/21 10:34 PM   Result Value Ref Range    GLUCOSE BY METER POCT 128 (H) 70 - 125 mg/dL         RADIOLOGY:  Reviewed all pertinent imaging. Please see official radiology report.  Cervical spine MRI w/o contrast   Final Result   IMPRESSION:   1.  The exam is moderate to significantly degraded by motion on the axial images and sagittal STIR images.   2.  At C7-T1, there is severe central canal stenosis secondary to moderate-sized central disc extrusion which compresses the cord. There is moderate to severe facet arthropathy and ligamentum flavum thickening. No definite abnormal signal within the    cord.   3.  At C6-C7, there is moderate to severe central canal stenosis and right neural foraminal narrowing with moderate left neural foraminal narrowing.   4.  At C5-C6, there is moderate to severe central canal stenosis, severe right and moderate to severe left neural foraminal narrowing.   5.  At C4-C5, there is moderate to severe central canal stenosis and relatively severe bilateral neural foraminal narrowing.   6.  Moderate Modic type I changes at the superior endplate of T1. Advanced facet arthropathy on the right at C7-T1 with reactive edema at the articular facets and right T1 pedicle.      XR Chest Port 1 View   Final Result   IMPRESSION: Negative chest.          EKG:      Performed at: 1900    Impression: sinus rhythm, RBBB, prolonged QT    Rate: 78  Rhythm: sinus  Axis: 31  SC Interval: 144  QRS Interval: 150  QTc Interval: 510  ST Changes: No acute ST elevations or depressions.   Comparison: 08/20/18 (unable to view EKG however able to see  interpretation and values). Previous RBBB noted and QTc 507.     I have independently reviewed and interpreted the EKG(s) documented above.  Reviewed with Dr. Pace    I, Em Yap, am serving as a scribe to document services personally performed by Emani Brandt PA-C based on my observation and the provider's statements to me. I, Emani Brandt PA-C attest that Em Yap is acting in a scribe capacity, has observed my performance of the services and has documented them in accordance with my direction.    Emani Brandt PA-C  Emergency Medicine  [facility]  9/7/2021       Emani Brandt PA-C  09/08/21 1222

## 2021-09-08 VITALS
OXYGEN SATURATION: 98 % | SYSTOLIC BLOOD PRESSURE: 136 MMHG | TEMPERATURE: 98.1 F | DIASTOLIC BLOOD PRESSURE: 60 MMHG | WEIGHT: 186 LBS | BODY MASS INDEX: 30.95 KG/M2 | RESPIRATION RATE: 18 BRPM | HEART RATE: 86 BPM

## 2021-09-08 PROCEDURE — 250N000013 HC RX MED GY IP 250 OP 250 PS 637: Performed by: PHYSICIAN ASSISTANT

## 2021-09-08 RX ORDER — HYDROMORPHONE HYDROCHLORIDE 2 MG/1
2 TABLET ORAL ONCE
Status: COMPLETED | OUTPATIENT
Start: 2021-09-08 | End: 2021-09-08

## 2021-09-08 RX ORDER — HYDROMORPHONE HYDROCHLORIDE 2 MG/1
2 TABLET ORAL EVERY 8 HOURS PRN
Qty: 5 TABLET | Refills: 0 | Status: SHIPPED | OUTPATIENT
Start: 2021-09-08 | End: 2022-03-07

## 2021-09-08 RX ADMIN — HYDROMORPHONE HYDROCHLORIDE 2 MG: 2 TABLET ORAL at 00:22

## 2021-09-08 ASSESSMENT — ENCOUNTER SYMPTOMS
FEVER: 0
LIGHT-HEADEDNESS: 0
NUMBNESS: 1
CHILLS: 0
HEADACHES: 0
DIARRHEA: 0
SHORTNESS OF BREATH: 0
COLOR CHANGE: 0
WEAKNESS: 0
ABDOMINAL PAIN: 0
DIZZINESS: 0

## 2021-09-08 NOTE — ED NOTES
Pt refusing medications at this time. States she doesn't want to take more medications. Pt will call if she changes her mind.

## 2021-09-08 NOTE — DISCHARGE INSTRUCTIONS
MRI shows, at C7-T1, there is severe central canal stenosis secondary to moderate-sized central disc extrusion which compresses the cord. There is severe central canal stenosis throughout the remainder of your cervical spine as well. This is likely the cause of your pain. I discussed with Dr. Draper with [facility], and plan is for you to be seen tomorrow at [facility]. For pain, you can take dilaudid 2 mg every 8 hours as needed for breakthrough pain. Take medications as prescribed.  Be aware they can make you sleepy or drowsy so do not drive while taking, do other dangerous activities, or mix with other sedatives or alcohol.  You should also know that these medicationscan be addictive. If you develop any new or worsening symptoms including weakness in your extremities, numbness/inability to feel your extremities return to ED right away.

## 2021-09-11 ENCOUNTER — NURSE TRIAGE (OUTPATIENT)
Dept: NURSING | Facility: CLINIC | Age: 81
End: 2021-09-11

## 2021-09-11 ENCOUNTER — OFFICE VISIT (OUTPATIENT)
Dept: URGENT CARE | Facility: URGENT CARE | Age: 81
End: 2021-09-11
Payer: MEDICARE

## 2021-09-11 VITALS
HEART RATE: 83 BPM | OXYGEN SATURATION: 91 % | DIASTOLIC BLOOD PRESSURE: 52 MMHG | SYSTOLIC BLOOD PRESSURE: 130 MMHG | TEMPERATURE: 99.8 F

## 2021-09-11 DIAGNOSIS — R21 RASH: Primary | ICD-10-CM

## 2021-09-11 DIAGNOSIS — F41.9 ANXIETY: ICD-10-CM

## 2021-09-11 PROCEDURE — 99214 OFFICE O/P EST MOD 30 MIN: CPT | Performed by: PREVENTIVE MEDICINE

## 2021-09-11 RX ORDER — TACROLIMUS 1 MG/G
OINTMENT TOPICAL 2 TIMES DAILY
Qty: 60 G | Refills: 0 | Status: SHIPPED | OUTPATIENT
Start: 2021-09-11 | End: 2022-03-07

## 2021-09-11 RX ORDER — TACROLIMUS 1 MG/G
OINTMENT TOPICAL 2 TIMES DAILY
Qty: 60 G | Refills: 0 | Status: SHIPPED | OUTPATIENT
Start: 2021-09-11 | End: 2021-09-11

## 2021-09-12 NOTE — TELEPHONE ENCOUNTER
Davie, patient's son calling to say patient has Itching and rash, spots on lip, eye feels different. Patient has spinal stenosis and was seen in the ED and was given Dilaudid. Patient finished that and is taking Valium for anxiety and also for the pain.    Son wants to know if patient should be seen in the urgent care tonight.    Advised urgent care is closing at 8 pm but writer would need patient to assess her current condition and that would take time. Son says he will take patient to urgent immediately.      Reason for Disposition    General information question, no triage required and triager able to answer question    Additional Information    Negative: RN needs further essential information from caller in order to complete triage    Negative: Requesting regular office appointment    Negative: [1] Caller requesting NON-URGENT health information AND [2] PCP's office is the best resource    Negative: Health Information question, no triage required and triager able to answer question    Protocols used: INFORMATION ONLY CALL - NO TRIAGE-A-

## 2021-09-12 NOTE — PATIENT INSTRUCTIONS
Tacrolimus two times per day for 2 weeks  Moisturizer to skin daily  DON'T ITCH  Follow up if not improving over next 2 weeks.

## 2021-09-16 ENCOUNTER — OFFICE VISIT (OUTPATIENT)
Dept: URGENT CARE | Facility: URGENT CARE | Age: 81
End: 2021-09-16
Payer: MEDICARE

## 2021-09-16 VITALS
RESPIRATION RATE: 20 BRPM | OXYGEN SATURATION: 96 % | WEIGHT: 186 LBS | DIASTOLIC BLOOD PRESSURE: 83 MMHG | SYSTOLIC BLOOD PRESSURE: 140 MMHG | TEMPERATURE: 97.4 F | BODY MASS INDEX: 30.95 KG/M2 | HEART RATE: 99 BPM

## 2021-09-16 DIAGNOSIS — L30.9 DERMATITIS, UNSPECIFIED: ICD-10-CM

## 2021-09-16 DIAGNOSIS — R60.9 DEPENDENT EDEMA: Primary | ICD-10-CM

## 2021-09-16 PROCEDURE — 99213 OFFICE O/P EST LOW 20 MIN: CPT | Performed by: PHYSICIAN ASSISTANT

## 2021-09-16 ASSESSMENT — ENCOUNTER SYMPTOMS
NEUROLOGICAL NEGATIVE: 1
FATIGUE: 1

## 2021-09-16 NOTE — PROGRESS NOTES
Assessment & Plan     Dependent edema      Dermatitis, unspecified      Benadryl (diphenhydramine) 25-50 mg every 6 hours as needed. May try before bed as this will make you tired.     Keep legs elevated, wear compression stockings during the day. This is normal edema. Due to gravity, gravity pulls down to the lowest point (your feet). Wear sneakers and supportive shoes.            Return in about 1 week (around 2021) for Follow up. with PCP      Subjective     Tonya is a 80 year old female who presents to clinic today with friend for the following health issues:  Chief Complaint   Patient presents with     Urgent Care     swollen L ankle and rash and swollen arms     Tonya presents with reports of bilateral lower leg swelling off and on as well as rash on her upper left extremity. She states she has had rash since being hospitalized, it has improved some. She was seen and sent in RX which she was unable to afford. She has not tried anything else at this time. She also reports she wears compression stockings when she can, but is unable to put them on herself.           Review of Systems   Constitutional: Positive for fatigue.   HENT: Negative.    Cardiovascular: Positive for peripheral edema.   Skin: Positive for rash.   Neurological: Negative.            Objective    BP (!) 140/83   Pulse 99   Temp 97.4  F (36.3  C) (Oral)   Resp 20   Wt 84.4 kg (186 lb)   LMP  (LMP Unknown)   SpO2 96%   BMI 30.95 kg/m    Physical Exam  Constitutional:       Appearance: Normal appearance.   HENT:      Head: Normocephalic and atraumatic.   Cardiovascular:      Rate and Rhythm: Normal rate and regular rhythm.      Heart sounds: Normal heart sounds.   Pulmonary:      Effort: Pulmonary effort is normal.      Breath sounds: Normal breath sounds.   Musculoskeletal:      Cervical back: Neck supple.      Right lower le+ Pitting Edema present.      Left lower le+ Pitting Edema present.   Skin:     Findings: Rash (left  upper extremity) present.   Neurological:      Mental Status: She is alert.              Maykel Gomez PA-C

## 2021-09-16 NOTE — PATIENT INSTRUCTIONS
"Benadryl (diphenhydramine) 25-50 mg every 6 hours as needed. May try before bed as this will make you tired.     Keep legs elevated, wear compression stockings during the day. This is normal edema. Due to gravity, gravity pulls down to the lowest point (your feet). Wear sneakers and supportive shoes.     Patient Education     Contact Dermatitis  Contact dermatitis is a skin rash caused by something that touches the skin and makes it irritated and inflamed. Your skin may be red, swollen, dry, and may be cracked. Blisters may form and ooze. The rash will itch.   Contact dermatitis often forms on the face and neck, backs of hands, forearms, genitals, and lower legs. But it can affect any area.   People can get contact dermatitis from lots of sources. These include:    Plants such as poison ivy, oak, or sumac    Chemicals in hair dyes and rinses, soaps, solvents, waxes, fingernail polish, and deodorants     Jewelry or watchbands made of nickel or cobalt  Contact dermatitis is not passed from person to person.  Talk with your healthcare provider about what may have caused the rash. A type of allergy testing called \"patch testing\" may be used to discover what you are allergic to. You will need to stay away from the source of the rash in the future to prevent it from coming back.   Treatment is done to ease itching and prevent the rash from coming back. The rash should go away in a few days to a few weeks.   Home care  Your healthcare provider may prescribe medicine to ease swelling and itching. Follow all instructions when using these medicines.   General care    Stay away from anything that heats up your skin, such as hot showers or baths, or direct sunlight. This can make itching worse.    Apply cold compresses to soothe your sores to help ease your symptoms. Do this for 30 minutes 3 to 4 times a day. You can make a cold compress by soaking a cloth in cold water. Squeeze out excess water. You can add colloidal oatmeal " to the water to help reduce itching. For severe itching in a small area, apply an ice pack wrapped in a thin towel. Do this for 20 minutes 3 to 4 times a day.    You can also try wet dressings. One way to do this is to wear a wet piece of clothing under a dry one. Wear a damp shirt under a dry shirt if your upper body is affected. This can relieve itching and prevent you from scratching the affected area.    You can also help ease large areas of itching by taking a lukewarm bath with colloidal oatmeal added to the water.    Use hydrocortisone cream for redness and irritation, unless another medicine was prescribed. Calamine lotion can also relieve mild symptoms.    Use oral diphenhydramine to help reduce itching. You can buy this antihistamine at drugstores and grocery stores. It can make you sleepy, so use lower doses during the daytime. Don't use diphenhydramine if you have glaucoma or have trouble urinating because of an enlarged prostate.    If a plant causes your rash, make sure to wash your skin and the clothes you were wearing when you came into contact with the plant. This is to wash away the plant oils that gave you the rash and prevent more or worse symptoms. If you have a pet that's been outdoors, its fur may also have oil from the plant. Bathe your pet with soap or shampoo.    Stay away from the substance or object that causes your symptoms. If you can t stay away from it, wear gloves or some other type of protection    Follow-up care  Follow up with your healthcare provider, or as advised.  When to seek medical advice  Call your healthcare provider or seek medical attention right away if any of these occur:     Spreading of the rash to other parts of your body    Severe swelling of your face, eyelids, mouth, throat or tongue    Trouble urinating due to swelling in the genital area    Fever of 100.4 F (38 C) or higher, or as advised by your provider    Redness or swelling that gets worse    Pain that  gets worse    Foul-smelling fluid leaking from the skin    Yellow-brown crusts on the open blisters  [employer] last reviewed this educational content on 8/1/2019 2000-2021 [employer]. All rights reserved. This information is not intended as a substitute for professional medical care. Always follow your healthcare professional's instructions.

## 2021-09-24 ENCOUNTER — HOSPITAL ENCOUNTER (EMERGENCY)
Facility: CLINIC | Age: 81
Discharge: LEFT BEFORE DISCHARGE | End: 2021-09-24
Admitting: EMERGENCY MEDICINE
Payer: MEDICARE

## 2021-09-24 VITALS
BODY MASS INDEX: 29.99 KG/M2 | OXYGEN SATURATION: 97 % | RESPIRATION RATE: 20 BRPM | WEIGHT: 180 LBS | TEMPERATURE: 98.5 F | SYSTOLIC BLOOD PRESSURE: 163 MMHG | HEART RATE: 68 BPM | HEIGHT: 65 IN | DIASTOLIC BLOOD PRESSURE: 79 MMHG

## 2021-09-24 LAB — GLUCOSE BLDC GLUCOMTR-MCNC: 91 MG/DL (ref 70–99)

## 2021-09-24 PROCEDURE — 999N000104 HC STATISTIC NO CHARGE

## 2021-09-24 ASSESSMENT — MIFFLIN-ST. JEOR: SCORE: 1292.35

## 2021-09-25 NOTE — ED TRIAGE NOTES
Arrives to ED accompanied by son with c/o HA pressure and nausea that began at 1800. Hx of hyperglycemia. Reports having difficulty with glucometer at home. Was admitted 2 months ago with hyperglycemia. Is on Metformin and Glipizide. FSG 91 in triage.

## 2021-10-19 ENCOUNTER — TELEPHONE (OUTPATIENT)
Dept: AUDIOLOGY | Facility: CLINIC | Age: 81
End: 2021-10-19

## 2021-10-20 NOTE — TELEPHONE ENCOUNTER
Walk-in hearing aid services on 10/19/21: The patient asked to have her left hearing aid cleaned and the tubing replaced, stating it felt too short.  Left hearing aid and earmold were cleaned and the earmold tubing was replaced.  It was cut slightly longer than the previous tubing.  The patient reported a comfortable fit after today's work.  She is scheduled to return to the clinic next month for a hearing aid evaluation.

## 2021-10-21 ENCOUNTER — OFFICE VISIT (OUTPATIENT)
Dept: AUDIOLOGY | Facility: CLINIC | Age: 81
End: 2021-10-21
Payer: COMMERCIAL

## 2021-10-21 DIAGNOSIS — H90.3 SENSORINEURAL HEARING LOSS, BILATERAL: Primary | ICD-10-CM

## 2021-10-21 PROCEDURE — V5266 BATTERY FOR HEARING DEVICE: HCPCS | Mod: NU | Performed by: AUDIOLOGIST

## 2021-11-02 ENCOUNTER — OFFICE VISIT (OUTPATIENT)
Dept: RHEUMATOLOGY | Facility: CLINIC | Age: 81
End: 2021-11-02
Payer: MEDICARE

## 2021-11-02 VITALS — HEART RATE: 80 BPM | SYSTOLIC BLOOD PRESSURE: 130 MMHG | DIASTOLIC BLOOD PRESSURE: 70 MMHG

## 2021-11-02 DIAGNOSIS — M15.0 PRIMARY OSTEOARTHRITIS INVOLVING MULTIPLE JOINTS: Primary | ICD-10-CM

## 2021-11-02 DIAGNOSIS — M25.50 POLYARTHRALGIA: ICD-10-CM

## 2021-11-02 DIAGNOSIS — M77.8 LEFT SHOULDER TENDINITIS: ICD-10-CM

## 2021-11-02 PROCEDURE — 20604 DRAIN/INJ JOINT/BURSA W/US: CPT | Mod: LT | Performed by: INTERNAL MEDICINE

## 2021-11-02 PROCEDURE — 99213 OFFICE O/P EST LOW 20 MIN: CPT | Mod: 25 | Performed by: INTERNAL MEDICINE

## 2021-11-02 NOTE — PROGRESS NOTES
Rheumatology follow-up visit note     Tonya is a 81 year old female presents today for follow-up.    Tonya was seen today for recheck.    Diagnoses and all orders for this visit:    Primary osteoarthritis involving multiple joints  -     RI ARTHROCNT ASPIR&/INJ SMALL JT/BURSAW/US REC RPRT  -     triamcinolone acetonide (KENALOG-10) injection 10 mg    Polyarthralgia    Left shoulder tendinitis        This patient is here for exacerbation of pain, this is especially worse in her left hand, epicentered at the second MCP, and would like to proceed with local injection which were done after pros and cons were outlined under ultrasound guidance with 10 mg of Kenalog.     HPI    Tonya Yang is a 81 year old female is here for follow-up of a moderately severe flare up of pain accompanied by her son.  She has widespread osteoarthritis.  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    Joints affected include the left MCP(s): 2nd MCP area. This has gone on for several weeks. Pain is described as sharp. It is worse with activity at times bedtime..  Her symptoms are moderately severe. The symptoms are progressive.  Associated findings  do not include: swelling, rash.  There is no associated recent fall or trauma.  Over-the-counter treatment to date has been without significant relief.    Further historical information, including ROS and limitation in activities as noted in the multidimensional health assessment questionnaire scanned in the EMR and in the assessment and plan section.      DETAILED EXAMINATION  11/02/21  :    Vitals:    11/02/21 1414   BP: 130/70   Pulse: 80     She is accompanied by her  who facilitated with communication.  She is alert alert oriented. Head including the face is  examined for malar rash, heliotropes, scarring, lupus pernio. Eyes examined for redness such as in episcleritis/scleritis, periorbital lesions.   Neck/ Face examined for parotid gland swelling, range of motion of neck.  Left upper and lower and right upper and lower extremities examined for tenderness, swelling, warmth of the appendicular joints, range of motion, edema, rash.  Some of the important findings included: she does not have evidence of synovitis in the palpable joints of the upper extremities.  No significant deformities of the digits.  She has marked tenderness and subtle swelling of the left second MCP, tenderness in scattered PIPs.  Patient Active Problem List    Diagnosis Date Noted     Confusion 07/20/2021     Priority: Medium     Hyperglycemia 07/20/2021     Priority: Medium     Acute renal failure, unspecified acute renal failure type (H) 07/20/2021     Priority: Medium     Bilateral deafness 01/23/2018     Priority: Medium     Acute deep vein thrombosis (DVT) of left lower extremity, unspecified vein (H) 2013 w recurrence  01/23/2018     Priority: Medium     Class 2 obesity due to excess calories with serious comorbidity and body mass index (BMI) of 37.0 to 37.9 in adult 10/24/2017     Priority: Medium     Type 2 diabetes mellitus with diabetic nephropathy, without long-term current use of insulin (H) 02/21/2017     Priority: Medium     Muscle weakness (generalized) worse since 11-16 w drowsiness 02/14/2017     Priority: Medium     Chronic pain syndrome-issue of broken controlled sub agreement  04/25/2016     Priority: Medium     Patient is followed by Bethanie Finnegan MD for ongoing prescription of pain medication.  All refills should be approved by this provider, or covering partner.    AGREEMENT BROKEN  By getting meds from an outside provider  See 4-2016 notes     Medication(s): oxycodone a 5mgm /day .   Maximum quantity per month: 30   Clinic visit frequency required: Q 3 months      Controlled substance agreement on file: Yes       Date(s): 9-23-15    Pain Clinic evaluation in the past: No    DIRE Total Score(s):  No flowsheet data found.    Last [facility] website verification:  done on 5-2-16   [email or web address]         Bilateral leg edema worsening w her increasing inactivity  04/25/2016     Priority: Medium     Sensorineural hearing loss, bilateral 04/19/2016     Priority: Medium     Long-term (current) use of anticoagulants [Z79.01] 03/22/2016     Priority: Medium     Benign essential hypertension 03/22/2016     Priority: Medium     Ankle edema 03/22/2016     Priority: Medium     Microalbuminuria 03/17/2016     Priority: Medium     Leukocytosis w Lt shift  03/17/2016     Priority: Medium     Localized edema-L>R lat malleolus 02/20/2016     Priority: Medium     Mixed hyperlipidemia 02/18/2016     Priority: Medium     Primary insomnia 12/03/2015     Priority: Medium     Left-sided low back pain with left-sided sciatica since 1993 w reoccurrence 6-15  11/03/2015     Priority: Medium     Meniere's disease (cochlear hydrops), LT 11/03/2015     Priority: Medium     Steroid dependent for cochlear hydrops  since 1985 11/03/2015     Priority: Medium     Lower urinary tract infectious disease 10/25/2015     Priority: Medium     Diagnosis updated by automated process. Provider to review and confirm.       DVT (deep venous thrombosis), left 07/20/2015     Priority: Medium     Baker's cyst, left 07/17/2015     Priority: Medium     Diarrhea r/o exacerbated by metformin -since 30's 06/30/2015     Priority: Medium     Bipolar disorder, in full remission, most recent episode depressed (H) 06/04/2015     Priority: Medium     Temporomandibular joint disorder 12/01/2014     Priority: Medium     Problem list name updated by automated process. Provider to review       Elevated liver enzymes since 12-13 liver abscess I&D 01/16/2014     Priority: Medium     Abscess,& FB in liver in 4-10 & 4-11 w  recurrent pain in RUQ  in 7-13 s/po lap I&D and unroofing sans finding of an FB  in 11-13 11/26/2013     Priority: Medium     Deafness 10/30/2013     Priority: Medium     Iron deficiency anemia due to chronic blood loss 10/03/2013     Priority: Medium     Health Care Home 08/30/2013     Priority: Medium     [facility]  Patient Care Plan  About Me  Patient Name:  Tonya Yang    YOB: 1940  Age:   72 year old   Bailee MRN: [id number] Telephone Information:     Home Phone [phone]   Mobile [phone]       Address:    [address]    [facility] [address] Email address:  No e-mail address on record      Emergency Contact(s)  Name Relationship Lgl Grd Work Phone Home Phone Mobile Phone   1MANJU LARIOS Son   [phone]            Primary language:  English     needed? No   [facility]:  [phone] op. 1  Other communication barriers: Hard of hearing  Preferred Method of Communication:  Phone  Current living arrangement: I live in a private home with family  Mobility Status/ Medical Equipment:    Other information to know about me:    Health Maintenance  Immunizations:     Cancer Screening:     My Access Plan  Medical Emergency 911   Primary Clinic Line [phone]   24 Hour Appointment Line [phone] or  [phone]) (toll-free)   24 Hour Nurse Line [phone] (toll-free)   Preferred Urgent Care     Preferred Hospital [facility]   Preferred Pharmacy [id number] - [place], [place] - [address]     Behavioral Health Crisis Line Crisis Connection, [phone] or 911     My Care Team Members  Patient Care Team       Relationship Specialty Notifications Start End    Bethanie Finnegan MD PCP - General Family Practice  8/22/13     Phone: [phone] Fax: [phone]         [facility] [address] [facility] [address]    Valencia Sanchez RN [facility] Clinic Care Coordinator   8/30/13     Phone:  [phone] Fax: [phone]             My Care Plans  Self Management and Treatment Plan  Presenting Concern Signs and Symptoms Goal/Action Plan   Home IV antibiotics x 8 weeks  [facility] - [phone]   Start Date:8/30/13 Active/ Initiated with: Date Reviewed:           Start Date: Active/Complete Initiated with: Date Reviewed:           Start Date: Active/Complete Initiated with: Date Reviewed:       Action Plans on File: None  Advance Care Plans/Directives on file:  No           My Medical and Care Information  Problem List   Patient Active Problem List   Diagnosis     Postmenopausal atrophic vaginitis     Steroid dependent     Bipolar disorder     Chronic diarrhea     Type 2 diabetes, HbA1C goal < 7%     Hyperlipidemia LDL goal <100     IBS (irritable bowel syndrome)     Major depression in complete remission     Obesity     Meniere's disease (cochlear hydrops)     GERD (gastroesophageal reflux disease)     Hypertension goal BP (blood pressure) < 130/80     CKD (chronic kidney disease) stage 3, GFR 30-59 ml/min     Tobacco abuse: [age] y/o @ 2ppd= 65pk yr hx      Obstructive lung disease : moderate/spirometry w oximetry=91% on 7-18-13      Anemia     Abscess,& FB in liver in 4-10 & 4-11 w recurrent pain in RUQ  in 7-13      Hepatic abscess            Allergies   Allergies   Allergen Reactions     Propofol      Nausea and vomiting        Health Care Home Complexity Tier:      Care Coordination Start Date: 08/30/13   Frequency of Care Coordination: every 2 weeks   Form Last Updated: 08/30/2013            CKD (chronic kidney disease) stage 3, GFR 30-59 ml/min (H) 07/18/2013     Priority: Medium     IBS (irritable bowel syndrome) 06/20/2013     Priority: Medium     Meniere's disease (cochlear hydrops) - on prednisone and Diazide. 06/20/2013     Priority: Medium     GERD (gastroesophageal reflux disease) 06/20/2013     Priority: Medium     Mucopurulent chronic bronchitis (HCC): spirometry 7-18-13   FEV=79; FEV_FVC=65% 06/20/2013     Priority: Medium     Postmenopausal atrophic vaginitis 02/13/2013     Priority: Medium     Past Surgical History:   Procedure Laterality Date     CATARACT IOL, RT/LT Left 7/2013     FOOT SURGERY      toe     GI SURGERY       LAPAROSCOPIC HEPATECTOMY PARTIAL  11/4/2013    Procedure: LAPAROSCOPIC HEPATECTOMY PARTIAL;  Laparoscopic Debridement of Liver Abcess;  Surgeon: Sepideh Green MD;  Location: [facility] OR     ORTHOPEDIC SURGERY       PICC INSERTION  8/28/2013    5fr DL Power PICC, 41cm (1cm external length) in the R lateral brachial vein with tip in the SVC RA junction.     RECTAL SURGERY      1970s     VASCULAR SURGERY        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     Hepatic abscess      Hyperlipidemia      Hypertension      Irritable bowel syndrome      Meniere disease      Neuropathy      Noninfectious ileitis      Peritoneal abscess (H)      Spinal stenosis of lumbar region      Steroid long-term use      Vaginitis, atrophic, postmenopausal      Vitamin D deficiency      Allergies   Allergen Reactions     Propofol Nausea and Vomiting     Shellfish Allergy      Other reaction(s): Dizziness     Current Outpatient Medications   Medication Sig Dispense Refill     ACE/ARB NOT PRESCRIBED, INTENTIONAL, 1 each continuous prn ACE & ARB not prescribed due to CKD (Chronic Kidney Disease)       acetaminophen (TYLENOL) 325 MG tablet Take 3 tablets (975 mg) by mouth 3 times daily       amLODIPine (NORVASC) 10 MG tablet Take 1 tablet (10 mg) by mouth daily 30 tablet 0     ammonium lactate (LAC-HYDRIN) 12 %  external lotion Externally apply topically daily as needed       atorvastatin (LIPITOR) 10 MG tablet Take 10 mg by mouth At Bedtime       Barberry-Oreg Grape-Goldenseal (BERBERINE COMPLEX PO) Take 1 tablet by mouth daily       blood glucose (ACCU-CHEK FASTCLIX) lancing device FOR TESTING ONCE DAILY. DX  E11.9 TYPE 2 DIABETES       blood glucose (CONTOUR TEST) test strip TESTING EVERY DAY DX  E11.9       chlorthalidone (HYGROTON) 25 MG tablet Take 0.5 tablets (12.5 mg) by mouth daily (Patient not taking: Reported on 9/11/2021) 15 tablet 0     CONTOUR NEXT TEST test strip TESTING EVERY DAY DX  E11.9       cyanocobalamin 1000 MCG SUBL Place 1,000 mcg under the tongue daily       diazepam (VALIUM) 1 MG/ML solution Take 0.5 mLs (0.5 mg) by mouth 3 times daily 10 mL 0     DULoxetine (CYMBALTA) 60 MG EC capsule TAKE 1 CAPSULE (60 MG) BY MOUTH DAILY 90 capsule 1     fluconazole (DIFLUCAN) 150 MG tablet Take 150 mg by mouth See Admin Instructions One dose prn yeast infection (Patient not taking: Reported on 9/11/2021)       gabapentin (NEURONTIN) 100 MG capsule Take 2 capsules (200 mg) by mouth 2 times daily (Patient not taking: Reported on 9/11/2021) 60 capsule 0     glipiZIDE (GLUCOTROL) 10 MG tablet Take 2 tablets (20 mg) by mouth 2 times daily (before meals) 120 tablet 0     HYDROmorphone (DILAUDID) 2 MG tablet Take 1 tablet (2 mg) by mouth every 8 hours as needed for pain (Patient not taking: Reported on 9/11/2021) 5 tablet 0     HYDROmorphone (DILAUDID) 2 MG tablet Take 1 tablet (2 mg) by mouth every 4 hours as needed for severe pain (Patient not taking: Reported on 9/11/2021) 10 tablet 0     HYDROmorphone (DILAUDID) 2 MG tablet Take 1 tablet (2 mg) by mouth 3 times daily (Patient not taking: Reported on 9/11/2021) 30 tablet 0     hydrOXYzine (ATARAX) 25 MG tablet Take 25 mg by mouth nightly as needed for anxiety        JARDIANCE 25 MG TABS tablet Take 25 mg by mouth daily        lamoTRIgine (LAMICTAL) 100 MG tablet  Take 100 mg by mouth 2 times daily       Lidocaine (LIDOCARE) 4 % Patch Place 3 patches onto the skin every 24 hours To prevent lidocaine toxicity, patient should be patch free for 12 hrs daily. 90 patch 0     lisinopril (ZESTRIL) 10 MG tablet Take 1 tablet (10 mg) by mouth daily 30 tablet 0     melatonin 1 MG TABS tablet Take 1 tablet (1 mg) by mouth nightly as needed for sleep 30 tablet 0     metFORMIN (GLUCOPHAGE) 500 MG tablet Take 0.5 tablets (250 mg) by mouth 2 times daily (with meals) 15 tablet 0     methyl salicylate-menthol (ICY HOT) ointment Apply topically every 6 hours as needed (pain) 99.2 g 0     nystatin (MYCOSTATIN) 724960 UNIT/GM external powder Apply topically 2 times daily as needed Breast rash       omeprazole (PRILOSEC) 20 MG DR capsule Take 20 mg by mouth daily as needed       order for DME Equipment being ordered: wrist brace 1 Device 0     oxybutynin ER (DITROPAN-XL) 10 MG 24 hr tablet Take 10 mg by mouth At Bedtime        pioglitazone (ACTOS) 15 MG tablet Take 1 tablet (15 mg) by mouth daily 30 tablet 0     predniSONE (DELTASONE) 1 MG tablet Take 6 mg by mouth daily 1mg tab and 5mg tab       saccharomyces boulardii (FLORASTOR) 250 MG capsule Take 250 mg by mouth daily       tacrolimus (PROTOPIC) 0.1 % external ointment Apply topically 2 times daily 60 g 0     triamcinolone (KENALOG) 0.1 % external cream Apply topically 2 times daily       triamcinolone (KENALOG) 0.1 % external cream Apply topically 2 times daily as needed        vitamin D3 (CHOLECALCIFEROL) 50 mcg (2000 units) tablet Take 3 tablets by mouth daily       zinc gluconate 50 MG tablet Take 50 mg by mouth daily       family history includes Cerebrovascular Disease in her mother; Heart Disease in her brother, father, and mother.     Social Connections:      Frequency of Communication with Friends and Family:      Frequency of Social Gatherings with Friends and Family:      Attends Christianity Services:      Active Member of Clubs or  Organizations:      Attends Club or Organization Meetings:      Marital Status:           WBC   Date Value Ref Range Status   04/12/2016 8.9 4.0 - 11.0 10e9/L Final     WBC Count   Date Value Ref Range Status   09/07/2021 8.4 4.0 - 11.0 10e3/uL Final     RBC Count   Date Value Ref Range Status   09/07/2021 4.20 3.80 - 5.20 10e6/uL Final   04/12/2016 3.99 3.8 - 5.2 10e12/L Final     Hemoglobin   Date Value Ref Range Status   09/07/2021 10.4 (L) 11.7 - 15.7 g/dL Final   10/24/2017 12.2 11.7 - 15.7 g/dL Final     Hematocrit   Date Value Ref Range Status   09/07/2021 36.3 35.0 - 47.0 % Final   04/12/2016 36.0 35.0 - 47.0 % Final     MCV   Date Value Ref Range Status   09/07/2021 86 78 - 100 fL Final   04/12/2016 90 78 - 100 fl Final     MCH   Date Value Ref Range Status   09/07/2021 24.8 (L) 26.5 - 33.0 pg Final   04/12/2016 26.1 (L) 26.5 - 33.0 pg Final     Platelet Count   Date Value Ref Range Status   09/07/2021 314 150 - 450 10e3/uL Final   04/12/2016 287 150 - 450 10e9/L Final     % Lymphocytes   Date Value Ref Range Status   07/19/2021 17 % Final   04/12/2016 24.6 % Final     AST   Date Value Ref Range Status   07/19/2021 28 0 - 40 U/L Final   02/14/2017 25 0 - 45 U/L Final     ALT   Date Value Ref Range Status   07/19/2021 52 (H) 0 - 45 U/L Final   02/14/2017 26 0 - 50 U/L Final     Albumin   Date Value Ref Range Status   07/19/2021 4.2 3.5 - 5.0 g/dL Final   02/14/2017 3.6 3.4 - 5.0 g/dL Final     Alkaline Phosphatase   Date Value Ref Range Status   07/19/2021 159 (H) 45 - 120 U/L Final   02/14/2017 85 40 - 150 U/L Final     Creatinine   Date Value Ref Range Status   09/07/2021 0.84 0.60 - 1.10 mg/dL Final   10/24/2017 0.84 0.52 - 1.04 mg/dL Final     GFR Estimate   Date Value Ref Range Status   09/07/2021 67 >60 mL/min/1.73m2 Final     Comment:     As of July 11, 2021, eGFR is calculated by the CKD-EPI creatinine equation, without race adjustment. eGFR can be influenced by muscle mass, exercise, and diet. The  reported eGFR is an estimation only and is only applicable if the renal function is stable.   12/29/2020 >60 >60 mL/min/1.73m2 Final   10/24/2017 66 >60 mL/min/1.7m2 Final     Comment:     Non  GFR Calc     GFR Estimate If Black   Date Value Ref Range Status   12/29/2020 >60 >60 mL/min/1.73m2 Final   10/24/2017 80 >60 mL/min/1.7m2 Final     Comment:      GFR Calc     Creatinine Urine   Date Value Ref Range Status   02/14/2017 82 mg/dL Final     Sed Rate   Date Value Ref Range Status   10/23/2013 10 0 - 30 mm/h Final     Erythrocyte Sedimentation Rate   Date Value Ref Range Status   07/24/2021 14 0 - 20 mm/hr Final     C-Reactive Protein   Date Value Ref Range Status   04/21/2011 4.4 0.0 - 4.9 mg/L Final     CRP Inflammation   Date Value Ref Range Status   10/23/2013 8.4 (H) 0.0 - 8.0 mg/L Final     CRP   Date Value Ref Range Status   07/24/2021 0.3 0.0-<0.8 mg/dL Final

## 2021-11-15 ENCOUNTER — OFFICE VISIT (OUTPATIENT)
Dept: AUDIOLOGY | Facility: CLINIC | Age: 81
End: 2021-11-15
Payer: MEDICARE

## 2021-11-15 DIAGNOSIS — H90.3 SNHL (SENSORY-NEURAL HEARING LOSS), ASYMMETRICAL: Primary | ICD-10-CM

## 2021-11-15 PROCEDURE — V5275 EAR IMPRESSION: HCPCS | Mod: GY | Performed by: AUDIOLOGIST

## 2021-11-15 PROCEDURE — 92550 TYMPANOMETRY & REFLEX THRESH: CPT | Performed by: AUDIOLOGIST

## 2021-11-15 PROCEDURE — 92590 PR HEARING AID EXAM MONAURAL: CPT | Mod: GY | Performed by: AUDIOLOGIST

## 2021-11-15 PROCEDURE — 92557 COMPREHENSIVE HEARING TEST: CPT | Performed by: AUDIOLOGIST

## 2021-11-16 NOTE — PROGRESS NOTES
AUDIOLOGY REPORT    SUBJECTIVE:  Tonya Yang is a 81 year old female who was seen in the Audiology Clinic at the [facility] [place] [facility] for audiologic evaluation, referred by Ananya Mclean CNP from the [facility]. She was accompanied by [facility] services. The patient has been seen previously in this clinic on 3/26/18 for assessment and results indicated mild sloping to severe sensorineural hearing loss with 0% word recognition score in the left. Patient refused testing for the right ear as she reported there was no hearing on that side. 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 She reports that she previously had a cochlear implant evaluation, but is not interested in this as she is too worried she would lose the hearing that she has left. Tonya reports possible decrease in hearing and stable left tinnitus. She notes 2 weeks ago she woke up dizzy for a few hours, but it hasn t happened since. She notes dizziness when getting overwhelmed with loud noises. The patient denies pain, drainage, or ear surgeries. The patient notes difficulty with communication in a variety of listening situations.     OBJECTIVE: Fall Risk Screen:  1. Have you fallen two or more times in the past year? No  2. Have you fallen and had an injury in the past year? No    Otoscopic exam indicates ears are clear of cerumen bilaterally     Pure Tone Thresholds assessed using conventional audiometry with good  reliability from 250-8000 Hz bilaterally using insert earphones and circumaural headphones     RIGHT: Moderate to severe presumably sensorineural hearing loss at .25 to .5 kHz with no response at .75 to 8 kHz.    LEFT: Moderate to severe sensorineural hearing loss  Note: Patient reports .75 and 1 kHz were causing her dizziness, but she couldn't hear them    Tympanogram:    RIGHT: normal eardrum mobility    LEFT:   normal  eardrum mobility    Reflexes (reported by stimulus ear):  RIGHT: Ipsilateral is absent at frequencies tested  RIGHT: Contralateral is absent at frequencies tested  LEFT:   Ipsilateral is absent at frequencies tested  LEFT:   Contralateral is absent at frequencies tested      Speech Reception Threshold:    RIGHT: No response at 95 dB HL (patient would not tolerate higher presentation level)    LEFT:   75 dB HL  Speech Detection Threshold:     RIGHT: 70 dB HL    LEFT:   50 dB HL    Word Recognition Score:     RIGHT: 0% at 90 dB HL using NU-6 recorded word list.    LEFT:   16% at 85 dB HL using NU-6 recorded word list.    LEFT:   36% at 90 dB HL using NU-6 recorded word list.  Note: Completed at highest tolerable level     Patient is a hearing aid candidate. Patient would like to move forward with a hearing aid evaluation today. Therefore, the patient was presented with different options for amplification to help aid in communication. Discussed styles, levels of technology and monaural vs. binaural fitting. Discussed rechargeable vs disposable batteries and connectivity. Patient reported she previously tried different manufacturers, but did not like them. She would like to get the updated version of her current hearing aid. Realistic expectations for the hearing aids were also reviewed.      The hearing aid(s) mutually chosen were:  Left: NetTalonv AI 2400 R  COLOR: Espresso   BATTERY SIZE: rechargeable  EARMOLD/TIPS: Unitron fullshell silicone earmold    A left earmold was taken without incident.    ASSESSMENT: Today's results indicate asymmetric bilateral sensorineural hearing loss. Compared to patient's previous audiogram dated 3/26/18, left threshold have remained stable and left word recognition improved from 0% to 36% today. There is limited previous right ear information available for comparison.     Reviewed purchase information and warranty information with patient. The 45 day trial period was explained to  patient. The patient was given a copy of the [place] [employer] consumer brochure on purchasing hearing instruments. Patient risk factors have been provided to the patient in writing prior to the sale of the hearing aid per FDA regulation. The risk factors are also available in the User Instructional Booklet to be presented on the day of the hearing aid fitting. Hearing aid(s) ordered. Hearing aid evaluation completed.Today s results were discussed with the patient in detail.     PLAN:  Patient was counseled regarding hearing loss and impact on communication.It is recommended that Tonya follow up with ENT for medical clearance and dizziness. Tonya is not yet scheduled to return for a hearing aid fitting and programming. Her son, Davie, will be contacted to help schedule this appointment. Purchase agreement will be completed on that date. Please call this clinic with questions regarding these results or recommendations.        Sarah Grimaldo. CCC-A  Licensed Audiologist   [place] #[id number]

## 2022-02-07 NOTE — PROGRESS NOTES
AUDIOLOGY REPORT    SUBJECTIVE: Tonya Yang is a 81 year old female who was seen in the Audiology Clinic at the [facility] for a fitting of left [employer] AI 2400 R behind-the-ear hearing aid with custom earmold. Previous results have revealed a moderate to severe presumably sensorineural hearing loss at .25 to .5 kHz with no response at .75 to 8 kHz with 0% word recognition score in the right ear and a moderate to severe sensorineural hearing loss with 16% word recognition score in the left ear. The patient was given medical clearance to pursue amplification by Ananya Mclean C.N.P. (in scanning/media). She was not accompanied today by anyone. A clear mask was utilized for today's appointments. CART services were unavailable so typing on [employer] word was utilized as needed.     OBJECTIVE: The hearing aid conformity evaluation was completed.The hearing aids were placed and they provided a good fit.  Tonya reported that the full shell earmold was uncomfortable as it was much too big. We discussed getting it remade as a half shell as this will be closer to previous one. It was decided that patient would utilize her old earmold while a new one is reordered. She expressed understanding and agreement with this plan. Simulated real-ear-probe-microphone measurements were completed on the [employer] system and were a good match to NAL-NL1 target with soft sounds audible, moderate sounds comfortable, and loud sounds below discomfort. UCLs are verified through maximum power output measures and demonstrate appropriate limiting of loud inputs. Tonya was oriented to proper hearing aid use, care, cleaning (no water, dry brush), batteries (size: rechargeable, insertion/removal, toxicity, low-battery signal), aid insertion/removal, user booklet, warranty information, storage cases, and other hearing aid details. The patient confirmed understanding of hearing aid use and care,  "and showed proper insertion of hearing aid and batteries while in the office today. Patient reported that new hearing aid sounded too \"boomy\" and was not as clear. She also reported that her own voice sounded different and things sounded metallic at times. Various changes were made including changes to overall gain at various frequencies and changes to MPO. After each change, patient had provider read a passage from paperwork something to see if she was understanding. Realistic expectations were discussed again. In addition, patient's questions regarding cochlear implants were discussed again. Patient feels she is still not ready for this currently. We also discussed allowing time for patient's brain to adapt to the new hearing aid. She expressed understanding, but is unsure if she would use the hearing aid enough as is because she would want to switch to her old one. Attempted to make additional changes and passes were again read to patient using the new hearing aid and the old hearing aid. Patient then reported that her previous hearing aid was also not picking up everything. She admitted that she was remembers saying similar things such as \"things are not clear\" with her old hearing aid as well. She again expressed understanding of her poor word recognition scores. Given that patient was not happy with programming changes made, it was decided that the patient's old hearing aid settings will be transferred to the new hearing aid. Patient reported immediately that her own voice sounded better and things were clearer, but still different. She reported that she would be willing to wear the new hearing aid with these settings to allow her brain to adjust. After further discussion, we elected to have patient use new hearing aids with old settings and an attempt to reprogram the device will be made at the follow up appointment. Patient expressed understanding and agreement with this plan.    Hearing aids were programmed " "as follows:  Program 1: Everyday   Push button is set for manual power on/off as patient prefers to keep it as easy as possible.     EAR(S) FIT: Left  HEARING AID MODEL NAME: [employer] AI 2400 R  HEARING AID STYLE: Behind-the-ear  EARMOLDS/TIP: [employer] silicone full shell  SERIAL NUMBERS: Right: N/A Left: [id number]  WARRANTY END DATE: 2/21/2025  Patient was counseled regarding using a mask and wearing hearing aids. It was discussed that the patient should be careful when removing the mask and always check the ears to be sure the hearing aids are still in place following mask removal.  It is recommended that the patient strongly consider the use of an \"ear saver\" which anchors the mask behind the neck if using a mask that typically goes behind the ears, or use a mask that ties behind the head. The patient expressed understanding.    ASSESSMENT: A left [employer] AI 2400 behind-the-ear hearing aid(s) was fit today. Verification measures were performed. Tonya signed the Hearing Aid Purchase Agreement and was given a copy, as well as details on her hearing aids. Patient was counseled that exact out of pocket amounts cannot be determined for hearing aid claims being sent to insurance. Any insurance coverage information presented to the patient is an estimate only, and is not a guarantee of payment. Patient has been advised to check with their own insurance. A remake earmold was requested.     PLAN: Tonya will return for follow-up in 2-3 weeks for a hearing aid review appointment. At this time, the new earmold will be fit is it is received. An attempt to program hearing aids again will be made and any programming changes needed will be addressed. Please call this clinic with questions regarding today s appointment.    Goran Grimaldo CCC-A  Licensed Audiologist   [place] #[id number]          "

## 2022-02-08 ENCOUNTER — OFFICE VISIT (OUTPATIENT)
Dept: AUDIOLOGY | Facility: CLINIC | Age: 82
End: 2022-02-08
Payer: COMMERCIAL

## 2022-02-08 DIAGNOSIS — H90.3 SNHL (SENSORY-NEURAL HEARING LOSS), ASYMMETRICAL: Primary | ICD-10-CM

## 2022-02-08 PROCEDURE — V5020 CONFORMITY EVALUATION: HCPCS | Performed by: AUDIOLOGIST

## 2022-02-08 PROCEDURE — V5264 EAR MOLD/INSERT: HCPCS | Mod: NU | Performed by: AUDIOLOGIST

## 2022-02-08 PROCEDURE — V5257 HEARING AID, DIGIT, MON, BTE: HCPCS | Mod: NU | Performed by: AUDIOLOGIST

## 2022-02-08 PROCEDURE — V5241 DISPENSING FEE, MONAURAL: HCPCS | Mod: LT | Performed by: AUDIOLOGIST

## 2022-03-01 ENCOUNTER — OFFICE VISIT (OUTPATIENT)
Dept: RHEUMATOLOGY | Facility: CLINIC | Age: 82
End: 2022-03-01
Payer: MEDICARE

## 2022-03-01 VITALS
WEIGHT: 180 LBS | SYSTOLIC BLOOD PRESSURE: 154 MMHG | HEIGHT: 65 IN | BODY MASS INDEX: 29.99 KG/M2 | HEART RATE: 76 BPM | DIASTOLIC BLOOD PRESSURE: 70 MMHG

## 2022-03-01 DIAGNOSIS — M15.0 PRIMARY OSTEOARTHRITIS INVOLVING MULTIPLE JOINTS: Primary | ICD-10-CM

## 2022-03-01 DIAGNOSIS — M79.605 LOWER EXTREMITY PAIN, LEFT: ICD-10-CM

## 2022-03-01 PROCEDURE — 99214 OFFICE O/P EST MOD 30 MIN: CPT | Mod: 25 | Performed by: INTERNAL MEDICINE

## 2022-03-01 PROCEDURE — 20610 DRAIN/INJ JOINT/BURSA W/O US: CPT | Mod: LT | Performed by: INTERNAL MEDICINE

## 2022-03-01 RX ORDER — TRIAMCINOLONE ACETONIDE 40 MG/ML
40 INJECTION, SUSPENSION INTRA-ARTICULAR; INTRAMUSCULAR ONCE
Status: COMPLETED | OUTPATIENT
Start: 2022-03-01 | End: 2022-03-01

## 2022-03-01 RX ADMIN — TRIAMCINOLONE ACETONIDE 40 MG: 40 INJECTION, SUSPENSION INTRA-ARTICULAR; INTRAMUSCULAR at 15:34

## 2022-03-01 NOTE — PROGRESS NOTES
"      Rheumatology follow-up visit note     Tonya is a 81 year old female presents today for follow-up.    Tonya was seen today for recheck.    Diagnoses and all orders for this visit:    Primary osteoarthritis involving multiple joints  -     triamcinolone (KENALOG-40) injection 40 mg  -     ASPIRATION/INJECTION MAJOR JOINT    Lower extremity pain, left  -     triamcinolone (KENALOG-40) injection 40 mg  -     ASPIRATION/INJECTION MAJOR JOINT        This patient is here with exacerbation of pain.  This is in her left lower extremity.  She is known to have spinal stenosis recently was seen in spine clinic and had an epidural is not sure if that has helped, communication somewhat limited because of her severe hearing impairment the son helps.  We discussed the option of considering a corticosteroid action into the left knee.  She would like to try that and see if that helps with the some of her pain this is done after pros and cons outlined, 40 mg of Kenalog injected in the left knee anterolateral approach.  Follow-up in 3 months.    Follow up in 3 months.    HPI    Tonya Yang is a 81 year old female is here for follow-up of a moderately severe flare up of pain accompanied by her son.  She has widespread osteoarthritis.  She noted that pain is in the left lower extremity, this is around her knee more proximal and then again distal radiation.  She is not sure if she has pain emanating from her lower back down the left lower extremity in the context of spinal stenosis and recent epidural injection.  She does note however that she feels weak in her right lower extremity combination of pain in the left and weakness of the right makes it hard for her to walk as she considers herself \"crippled\".  I have asked her to check with her primary physician, neurologist for the right lower extremity weakness.  On her previous visit she had left hand second MCP injected.  This provided her with good benefit.  The pain has not " "recurred.    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   Further historical information, including ROS and limitation in activities as noted in the multidimensional health assessment questionnaire scanned in the EMR and in the assessment and plan section.          DETAILED EXAMINATION  03/01/22  :    Vitals:    03/01/22 1457   BP: (!) 154/70   BP Location: Right arm   Patient Position: Sitting   Cuff Size: Adult Regular   Pulse: 76   Weight: 81.6 kg (180 lb)   Height: 1.651 m (5' 5\")     Alert oriented. Head including the face is examined for malar rash, heliotropes, scarring, lupus pernio. Eyes examined for redness such as in episcleritis/scleritis, periorbital lesions.   Neck/ Face examined for parotid gland swelling, range of motion of neck.  Left upper and lower and right upper and lower extremities examined for tenderness, swelling, warmth of the appendicular joints, range of motion, edema, rash.  Some of the important findings included: she does not have evidence of synovitis in the palpable joints of the upper extremities.  No significant deformities of the digits.  + Heberden nodes.  Range of motion of the shoulders  show full abduction.   JLT but no effusion or warmth of the knees.  she does not have dactylitis of the digits.     Patient Active Problem List    Diagnosis Date Noted     Confusion 07/20/2021     Priority: Medium     Hyperglycemia 07/20/2021     Priority: Medium     Acute renal failure, unspecified acute renal failure type (H) 07/20/2021     Priority: Medium     Bilateral deafness 01/23/2018     Priority: Medium     Acute deep vein thrombosis (DVT) of left lower extremity, unspecified vein (H) 2013 w recurrence  01/23/2018     Priority: Medium     Class 2 obesity due to excess " calories with serious comorbidity and body mass index (BMI) of 37.0 to 37.9 in adult 10/24/2017     Priority: Medium     Type 2 diabetes mellitus with diabetic nephropathy, without long-term current use of insulin (H) 02/21/2017     Priority: Medium     Muscle weakness (generalized) worse since 11-16 w drowsiness 02/14/2017     Priority: Medium     Chronic pain syndrome-issue of broken controlled sub agreement  04/25/2016     Priority: Medium     Patient is followed by Bethanie Finnegan MD for ongoing prescription of pain medication.  All refills should be approved by this provider, or covering partner.    AGREEMENT BROKEN  By getting meds from an outside provider  See 4-2016 notes     Medication(s): oxycodone a 5mgm /day .   Maximum quantity per month: 30   Clinic visit frequency required: Q 3 months     Controlled substance agreement on file: Yes       Date(s): 9-23-15    Pain Clinic evaluation in the past: No    DIRE Total Score(s):  No flowsheet data found.    Last [facility] website verification:  done on 5-2-16   [email or web address]         Bilateral leg edema worsening w her increasing inactivity  04/25/2016     Priority: Medium     Sensorineural hearing loss, bilateral 04/19/2016     Priority: Medium     Long-term (current) use of anticoagulants [Z79.01] 03/22/2016     Priority: Medium     Benign essential hypertension 03/22/2016     Priority: Medium     Ankle edema 03/22/2016     Priority: Medium     Microalbuminuria 03/17/2016     Priority: Medium     Leukocytosis w Lt shift  03/17/2016     Priority: Medium     Localized edema-L>R lat malleolus 02/20/2016     Priority: Medium     Mixed hyperlipidemia 02/18/2016     Priority: Medium     Primary insomnia 12/03/2015     Priority: Medium     Left-sided low back pain with left-sided sciatica since 1993 w reoccurrence 6-15  11/03/2015     Priority: Medium     Meniere's disease (cochlear hydrops), LT 11/03/2015     Priority: Medium     Steroid dependent for  cochlear hydrops  since 1985 11/03/2015     Priority: Medium     Lower urinary tract infectious disease 10/25/2015     Priority: Medium     Diagnosis updated by automated process. Provider to review and confirm.       DVT (deep venous thrombosis), left 07/20/2015     Priority: Medium     Baker's cyst, left 07/17/2015     Priority: Medium     Diarrhea r/o exacerbated by metformin -since 30's 06/30/2015     Priority: Medium     Bipolar disorder, in full remission, most recent episode depressed (H) 06/04/2015     Priority: Medium     Temporomandibular joint disorder 12/01/2014     Priority: Medium     Problem list name updated by automated process. Provider to review       Elevated liver enzymes since 12-13 liver abscess I&D 01/16/2014     Priority: Medium     Abscess,& FB in liver in 4-10 & 4-11 w recurrent pain in RUQ  in 7-13 s/po lap I&D and unroofing sans finding of an FB  in 11-13 11/26/2013     Priority: Medium     Deafness 10/30/2013     Priority: Medium     Iron deficiency anemia due to chronic blood loss 10/03/2013     Priority: Medium     Health Care Home 08/30/2013     Priority: Medium     [facility] [place]  Patient Care Plan  About Me  Patient Name:  Tonya Yang    YOB: 1940  Age:   72 year old   [employer] MRN: [id number] Telephone Information:     Home Phone [phone]   Mobile [phone]       Address:    [facility]  [address] [address] Email address:  No e-mail address on record      Emergency Contact(s)  Name Relationship Lgl Grd Work Phone Home Phone Mobile Phone   1. MANJU YANG Son   [phone]            Primary language:  English     needed? No   [employer]:  [phone] op. 1  Other communication barriers: Hard of hearing  Preferred Method of Communication:  Phone  Current living arrangement: I live in a private home with family  Mobility Status/ Medical Equipment:    Other information to know about me:    Health  Maintenance  Immunizations:     Cancer Screening:     My Access Plan  Medical Emergency 911   Primary Clinic Line [phone]   24 Hour Appointment Line [phone] or  [phone]) (toll-free)   24 Hour Nurse Line [phone] (toll-free)   Preferred Urgent Care     Preferred Hospital [facility]   Preferred Pharmacy [id number] - [place], [place] - [address]     Behavioral Health Crisis Line Crisis Connection, [phone] or 911     My Care Team Members  Patient Care Team       Relationship Specialty Notifications Start End    Bethanie Finnegan MD PCP - General Family Practice  8/22/13     Phone: [phone] Fax: [phone]         [facility] [address] [facility] [address]    Valencia Sanchez RN [facility] Care Coordinator   8/30/13     Phone: [phone] Fax: [phone]             My Care Plans  Self Management and Treatment Plan  Presenting Concern Signs and Symptoms Goal/Action Plan   Home IV antibiotics x 8 weeks  [facility] - [phone]   Start Date:8/30/13 Active/ Initiated with: Date Reviewed:           Start Date: Active/Complete Initiated with: Date Reviewed:           Start Date: Active/Complete Initiated with: Date Reviewed:       Action Plans on File: None  Advance Care Plans/Directives on file:  No           My Medical and Care Information  Problem List   Patient Active Problem List   Diagnosis     Postmenopausal atrophic vaginitis     Steroid dependent     Bipolar disorder     Chronic diarrhea     Type 2 diabetes, HbA1C goal < 7%     Hyperlipidemia LDL goal <100     IBS (irritable bowel syndrome)     Major depression in complete remission     Obesity     Meniere's disease (cochlear hydrops)     GERD (gastroesophageal reflux disease)     Hypertension goal BP (blood pressure) < 130/80     CKD (chronic kidney disease) stage 3, GFR 30-59 ml/min     Tobacco abuse: [age] y/o @ 2ppd= 65pk yr hx      Obstructive lung disease :  moderate/spirometry w oximetry=91% on 7-18-13      Anemia     Abscess,& FB in liver in 4-10 & 4-11 w recurrent pain in RUQ  in 7-13      Hepatic abscess            Allergies   Allergies   Allergen Reactions     Propofol      Nausea and vomiting        Health Care Home Complexity Tier:      Care Coordination Start Date: 08/30/13   Frequency of Care Coordination: every 2 weeks   Form Last Updated: 08/30/2013            CKD (chronic kidney disease) stage 3, GFR 30-59 ml/min (H) 07/18/2013     Priority: Medium     IBS (irritable bowel syndrome) 06/20/2013     Priority: Medium     Meniere's disease (cochlear hydrops) - on prednisone and Diazide. 06/20/2013     Priority: Medium     GERD (gastroesophageal reflux disease) 06/20/2013     Priority: Medium     Mucopurulent chronic bronchitis (HCC): spirometry 7-18-13  FEV=79; FEV_FVC=65% 06/20/2013     Priority: Medium     Postmenopausal atrophic vaginitis 02/13/2013     Priority: Medium     Past Surgical History:   Procedure Laterality Date     CATARACT IOL, RT/LT Left 7/2013     FOOT SURGERY      toe     GI SURGERY       LAPAROSCOPIC HEPATECTOMY PARTIAL  11/4/2013    Procedure: LAPAROSCOPIC HEPATECTOMY PARTIAL;  Laparoscopic Debridement of Liver Abcess;  Surgeon: Sepideh Green MD;  Location: [facility] OR     ORTHOPEDIC SURGERY       PICC INSERTION  8/28/2013    5fr DL Power PICC, 41cm (1cm external length) in the R lateral brachial vein with tip in the SVC RA junction.     RECTAL SURGERY      1970s     VASCULAR SURGERY        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     Hepatic abscess       Hyperlipidemia      Hypertension      Irritable bowel syndrome      Meniere disease      Neuropathy      Noninfectious ileitis      Peritoneal abscess (H)      Spinal stenosis of lumbar region      Steroid long-term use      Vaginitis, atrophic, postmenopausal      Vitamin D deficiency      Allergies   Allergen Reactions     Propofol Nausea and Vomiting     Shellfish Allergy      Other reaction(s): Dizziness     Current Outpatient Medications   Medication Sig Dispense Refill     ACE/ARB NOT PRESCRIBED, INTENTIONAL, 1 each continuous prn ACE & ARB not prescribed due to CKD (Chronic Kidney Disease)       acetaminophen (TYLENOL) 325 MG tablet Take 3 tablets (975 mg) by mouth 3 times daily       amLODIPine (NORVASC) 10 MG tablet Take 1 tablet (10 mg) by mouth daily 30 tablet 0     ammonium lactate (LAC-HYDRIN) 12 % external lotion Externally apply topically daily as needed       atorvastatin (LIPITOR) 10 MG tablet Take 10 mg by mouth At Bedtime       Barberry-Oreg Grape-Goldenseal (BERBERINE COMPLEX PO) Take 1 tablet by mouth daily       blood glucose (ACCU-CHEK FASTCLIX) lancing device FOR TESTING ONCE DAILY. DX  E11.9 TYPE 2 DIABETES       blood glucose (CONTOUR TEST) test strip TESTING EVERY DAY DX  E11.9       chlorthalidone (HYGROTON) 25 MG tablet Take 0.5 tablets (12.5 mg) by mouth daily 15 tablet 0     CONTOUR NEXT TEST test strip TESTING EVERY DAY DX  E11.9       cyanocobalamin 1000 MCG SUBL Place 1,000 mcg under the tongue daily       diazepam (VALIUM) 1 MG/ML solution Take 0.5 mLs (0.5 mg) by mouth 3 times daily 10 mL 0     DULoxetine (CYMBALTA) 60 MG EC capsule TAKE 1 CAPSULE (60 MG) BY MOUTH DAILY 90 capsule 1     fluconazole (DIFLUCAN) 150 MG tablet Take 150 mg by mouth See Admin Instructions One dose prn yeast infection        gabapentin (NEURONTIN) 100 MG capsule Take 2 capsules (200 mg) by mouth 2 times daily 60 capsule 0     glipiZIDE (GLUCOTROL) 10 MG tablet Take 2 tablets (20 mg) by mouth 2 times  daily (before meals) 120 tablet 0     HYDROmorphone (DILAUDID) 2 MG tablet Take 1 tablet (2 mg) by mouth every 8 hours as needed for pain 5 tablet 0     HYDROmorphone (DILAUDID) 2 MG tablet Take 1 tablet (2 mg) by mouth every 4 hours as needed for severe pain 10 tablet 0     HYDROmorphone (DILAUDID) 2 MG tablet Take 1 tablet (2 mg) by mouth 3 times daily 30 tablet 0     hydrOXYzine (ATARAX) 25 MG tablet Take 25 mg by mouth nightly as needed for anxiety        JARDIANCE 25 MG TABS tablet Take 25 mg by mouth daily        lamoTRIgine (LAMICTAL) 100 MG tablet Take 100 mg by mouth 2 times daily       Lidocaine (LIDOCARE) 4 % Patch Place 3 patches onto the skin every 24 hours To prevent lidocaine toxicity, patient should be patch free for 12 hrs daily. 90 patch 0     lisinopril (ZESTRIL) 10 MG tablet Take 1 tablet (10 mg) by mouth daily 30 tablet 0     melatonin 1 MG TABS tablet Take 1 tablet (1 mg) by mouth nightly as needed for sleep 30 tablet 0     metFORMIN (GLUCOPHAGE) 500 MG tablet Take 0.5 tablets (250 mg) by mouth 2 times daily (with meals) 15 tablet 0     methyl salicylate-menthol (ICY HOT) ointment Apply topically every 6 hours as needed (pain) 99.2 g 0     nystatin (MYCOSTATIN) 769660 UNIT/GM external powder Apply topically 2 times daily as needed Breast rash       omeprazole (PRILOSEC) 20 MG DR capsule Take 20 mg by mouth daily as needed       order for DME Equipment being ordered: wrist brace 1 Device 0     oxybutynin ER (DITROPAN-XL) 10 MG 24 hr tablet Take 10 mg by mouth At Bedtime        pioglitazone (ACTOS) 15 MG tablet Take 1 tablet (15 mg) by mouth daily 30 tablet 0     predniSONE (DELTASONE) 1 MG tablet Take 6 mg by mouth daily 1mg tab and 5mg tab       saccharomyces boulardii (FLORASTOR) 250 MG capsule Take 250 mg by mouth daily       tacrolimus (PROTOPIC) 0.1 % external ointment Apply topically 2 times daily 60 g 0     triamcinolone (KENALOG) 0.1 % external cream Apply topically 2 times daily        triamcinolone (KENALOG) 0.1 % external cream Apply topically 2 times daily as needed        vitamin D3 (CHOLECALCIFEROL) 50 mcg (2000 units) tablet Take 3 tablets by mouth daily       zinc gluconate 50 MG tablet Take 50 mg by mouth daily       family history includes Cerebrovascular Disease in her mother; Heart Disease in her brother, father, and mother.  Social Connections: Not on file          WBC   Date Value Ref Range Status   04/12/2016 8.9 4.0 - 11.0 10e9/L Final     WBC Count   Date Value Ref Range Status   09/07/2021 8.4 4.0 - 11.0 10e3/uL Final     RBC Count   Date Value Ref Range Status   09/07/2021 4.20 3.80 - 5.20 10e6/uL Final   04/12/2016 3.99 3.8 - 5.2 10e12/L Final     Hemoglobin   Date Value Ref Range Status   09/07/2021 10.4 (L) 11.7 - 15.7 g/dL Final   10/24/2017 12.2 11.7 - 15.7 g/dL Final     Hematocrit   Date Value Ref Range Status   09/07/2021 36.3 35.0 - 47.0 % Final   04/12/2016 36.0 35.0 - 47.0 % Final     MCV   Date Value Ref Range Status   09/07/2021 86 78 - 100 fL Final   04/12/2016 90 78 - 100 fl Final     MCH   Date Value Ref Range Status   09/07/2021 24.8 (L) 26.5 - 33.0 pg Final   04/12/2016 26.1 (L) 26.5 - 33.0 pg Final     Platelet Count   Date Value Ref Range Status   09/07/2021 314 150 - 450 10e3/uL Final   04/12/2016 287 150 - 450 10e9/L Final     % Lymphocytes   Date Value Ref Range Status   07/19/2021 17 % Final   04/12/2016 24.6 % Final     AST   Date Value Ref Range Status   07/19/2021 28 0 - 40 U/L Final   02/14/2017 25 0 - 45 U/L Final     ALT   Date Value Ref Range Status   07/19/2021 52 (H) 0 - 45 U/L Final   02/14/2017 26 0 - 50 U/L Final     Albumin   Date Value Ref Range Status   07/19/2021 4.2 3.5 - 5.0 g/dL Final   02/14/2017 3.6 3.4 - 5.0 g/dL Final     Alkaline Phosphatase   Date Value Ref Range Status   07/19/2021 159 (H) 45 - 120 U/L Final   02/14/2017 85 40 - 150 U/L Final     Creatinine   Date Value Ref Range Status   09/07/2021 0.84 0.60 - 1.10 mg/dL Final    10/24/2017 0.84 0.52 - 1.04 mg/dL Final     GFR Estimate   Date Value Ref Range Status   09/07/2021 67 >60 mL/min/1.73m2 Final     Comment:     As of July 11, 2021, eGFR is calculated by the CKD-EPI creatinine equation, without race adjustment. eGFR can be influenced by muscle mass, exercise, and diet. The reported eGFR is an estimation only and is only applicable if the renal function is stable.   12/29/2020 >60 >60 mL/min/1.73m2 Final   10/24/2017 66 >60 mL/min/1.7m2 Final     Comment:     Non  GFR Calc     GFR Estimate If Black   Date Value Ref Range Status   12/29/2020 >60 >60 mL/min/1.73m2 Final   10/24/2017 80 >60 mL/min/1.7m2 Final     Comment:      GFR Calc     Creatinine Urine   Date Value Ref Range Status   02/14/2017 82 mg/dL Final     Sed Rate   Date Value Ref Range Status   10/23/2013 10 0 - 30 mm/h Final     Erythrocyte Sedimentation Rate   Date Value Ref Range Status   07/24/2021 14 0 - 20 mm/hr Final     C-Reactive Protein   Date Value Ref Range Status   04/21/2011 4.4 0.0 - 4.9 mg/L Final     CRP Inflammation   Date Value Ref Range Status   10/23/2013 8.4 (H) 0.0 - 8.0 mg/L Final     CRP   Date Value Ref Range Status   07/24/2021 0.3 0.0-<0.8 mg/dL Final

## 2022-03-07 ENCOUNTER — APPOINTMENT (OUTPATIENT)
Dept: MRI IMAGING | Facility: CLINIC | Age: 82
DRG: 871 | End: 2022-03-07
Payer: MEDICARE

## 2022-03-07 ENCOUNTER — HOSPITAL ENCOUNTER (INPATIENT)
Facility: CLINIC | Age: 82
LOS: 6 days | Discharge: NURSING, REHAB OR LONG-TERM CARE | DRG: 871 | End: 2022-03-13
Attending: EMERGENCY MEDICINE | Admitting: INTERNAL MEDICINE
Payer: MEDICARE

## 2022-03-07 ENCOUNTER — APPOINTMENT (OUTPATIENT)
Dept: RADIOLOGY | Facility: CLINIC | Age: 82
DRG: 871 | End: 2022-03-07
Payer: MEDICARE

## 2022-03-07 DIAGNOSIS — G89.29 CHRONIC MIDLINE LOW BACK PAIN WITH RIGHT-SIDED SCIATICA: Primary | ICD-10-CM

## 2022-03-07 DIAGNOSIS — J18.9 PNEUMONIA OF RIGHT LOWER LOBE DUE TO INFECTIOUS ORGANISM: ICD-10-CM

## 2022-03-07 DIAGNOSIS — G89.29 CHRONIC BILATERAL LOW BACK PAIN WITH SCIATICA, SCIATICA LATERALITY UNSPECIFIED: ICD-10-CM

## 2022-03-07 DIAGNOSIS — M54.40 CHRONIC BILATERAL LOW BACK PAIN WITH SCIATICA, SCIATICA LATERALITY UNSPECIFIED: ICD-10-CM

## 2022-03-07 DIAGNOSIS — M54.41 CHRONIC MIDLINE LOW BACK PAIN WITH RIGHT-SIDED SCIATICA: Primary | ICD-10-CM

## 2022-03-07 DIAGNOSIS — I10 BENIGN ESSENTIAL HYPERTENSION: ICD-10-CM

## 2022-03-07 DIAGNOSIS — E11.21 TYPE 2 DIABETES MELLITUS WITH DIABETIC NEPHROPATHY, WITHOUT LONG-TERM CURRENT USE OF INSULIN (H): ICD-10-CM

## 2022-03-07 DIAGNOSIS — G89.29 OTHER CHRONIC PAIN: ICD-10-CM

## 2022-03-07 DIAGNOSIS — G89.4 CHRONIC PAIN SYNDROME: ICD-10-CM

## 2022-03-07 DIAGNOSIS — A41.9 SEPSIS, DUE TO UNSPECIFIED ORGANISM, UNSPECIFIED WHETHER ACUTE ORGAN DYSFUNCTION PRESENT (H): ICD-10-CM

## 2022-03-07 DIAGNOSIS — R29.898 WEAKNESS OF RIGHT LOWER EXTREMITY: ICD-10-CM

## 2022-03-07 PROBLEM — R65.20 SEVERE SEPSIS (H): Status: ACTIVE | Noted: 2022-03-07

## 2022-03-07 PROBLEM — R81 GLUCOSURIA: Status: ACTIVE | Noted: 2022-03-07

## 2022-03-07 PROBLEM — R81 GLYCOSURIA: Status: ACTIVE | Noted: 2022-03-07

## 2022-03-07 LAB
ALBUMIN UR-MCNC: NEGATIVE MG/DL
ANION GAP SERPL CALCULATED.3IONS-SCNC: 14 MMOL/L (ref 5–18)
APPEARANCE UR: CLEAR
BILIRUB UR QL STRIP: NEGATIVE
BUN SERPL-MCNC: 23 MG/DL (ref 8–28)
C REACTIVE PROTEIN LHE: 3.5 MG/DL (ref 0–0.8)
CALCIUM SERPL-MCNC: 9 MG/DL (ref 8.5–10.5)
CHLORIDE BLD-SCNC: 100 MMOL/L (ref 98–107)
CO2 SERPL-SCNC: 25 MMOL/L (ref 22–31)
COLOR UR AUTO: ABNORMAL
CREAT SERPL-MCNC: 1.07 MG/DL (ref 0.6–1.1)
ERYTHROCYTE [DISTWIDTH] IN BLOOD BY AUTOMATED COUNT: 16.8 % (ref 10–15)
ERYTHROCYTE [SEDIMENTATION RATE] IN BLOOD BY WESTERGREN METHOD: 5 MM/HR (ref 0–20)
GFR SERPL CREATININE-BSD FRML MDRD: 53 ML/MIN/1.73M2
GLUCOSE BLD-MCNC: 282 MG/DL (ref 70–125)
GLUCOSE BLDC GLUCOMTR-MCNC: 202 MG/DL (ref 70–99)
GLUCOSE BLDC GLUCOMTR-MCNC: 251 MG/DL (ref 70–99)
GLUCOSE UR STRIP-MCNC: >1000 MG/DL
HCT VFR BLD AUTO: 37.8 % (ref 35–47)
HGB BLD-MCNC: 10.4 G/DL (ref 11.7–15.7)
HGB UR QL STRIP: NEGATIVE
KETONES UR STRIP-MCNC: ABNORMAL MG/DL
LACTATE SERPL-SCNC: 2 MMOL/L (ref 0.7–2)
LACTATE SERPL-SCNC: 2.5 MMOL/L (ref 0.7–2)
LEUKOCYTE ESTERASE UR QL STRIP: NEGATIVE
MCH RBC QN AUTO: 22.2 PG (ref 26.5–33)
MCHC RBC AUTO-ENTMCNC: 27.5 G/DL (ref 31.5–36.5)
MCV RBC AUTO: 81 FL (ref 78–100)
NITRATE UR QL: NEGATIVE
PH UR STRIP: 5 [PH] (ref 5–7)
PLATELET # BLD AUTO: 326 10E3/UL (ref 150–450)
POTASSIUM BLD-SCNC: 3.8 MMOL/L (ref 3.5–5)
RBC # BLD AUTO: 4.69 10E6/UL (ref 3.8–5.2)
RBC URINE: 1 /HPF
SARS-COV-2 RNA RESP QL NAA+PROBE: NEGATIVE
SODIUM SERPL-SCNC: 139 MMOL/L (ref 136–145)
SP GR UR STRIP: 1.04 (ref 1–1.03)
SQUAMOUS EPITHELIAL: 1 /HPF
UROBILINOGEN UR STRIP-MCNC: <2 MG/DL
WBC # BLD AUTO: 26.2 10E3/UL (ref 4–11)
WBC URINE: 1 /HPF

## 2022-03-07 PROCEDURE — 250N000013 HC RX MED GY IP 250 OP 250 PS 637: Performed by: STUDENT IN AN ORGANIZED HEALTH CARE EDUCATION/TRAINING PROGRAM

## 2022-03-07 PROCEDURE — 120N000001 HC R&B MED SURG/OB

## 2022-03-07 PROCEDURE — 250N000011 HC RX IP 250 OP 636: Performed by: EMERGENCY MEDICINE

## 2022-03-07 PROCEDURE — 96361 HYDRATE IV INFUSION ADD-ON: CPT

## 2022-03-07 PROCEDURE — 87635 SARS-COV-2 COVID-19 AMP PRB: CPT | Performed by: PHYSICIAN ASSISTANT

## 2022-03-07 PROCEDURE — 85652 RBC SED RATE AUTOMATED: CPT | Performed by: PHYSICIAN ASSISTANT

## 2022-03-07 PROCEDURE — 71046 X-RAY EXAM CHEST 2 VIEWS: CPT

## 2022-03-07 PROCEDURE — 83605 ASSAY OF LACTIC ACID: CPT | Performed by: STUDENT IN AN ORGANIZED HEALTH CARE EDUCATION/TRAINING PROGRAM

## 2022-03-07 PROCEDURE — 99285 EMERGENCY DEPT VISIT HI MDM: CPT | Mod: 25

## 2022-03-07 PROCEDURE — 96375 TX/PRO/DX INJ NEW DRUG ADDON: CPT

## 2022-03-07 PROCEDURE — 258N000003 HC RX IP 258 OP 636: Performed by: PHYSICIAN ASSISTANT

## 2022-03-07 PROCEDURE — 255N000002 HC RX 255 OP 636: Performed by: INTERNAL MEDICINE

## 2022-03-07 PROCEDURE — 72157 MRI CHEST SPINE W/O & W/DYE: CPT

## 2022-03-07 PROCEDURE — A9585 GADOBUTROL INJECTION: HCPCS | Performed by: INTERNAL MEDICINE

## 2022-03-07 PROCEDURE — 250N000012 HC RX MED GY IP 250 OP 636 PS 637: Performed by: STUDENT IN AN ORGANIZED HEALTH CARE EDUCATION/TRAINING PROGRAM

## 2022-03-07 PROCEDURE — 86140 C-REACTIVE PROTEIN: CPT | Performed by: PHYSICIAN ASSISTANT

## 2022-03-07 PROCEDURE — 96365 THER/PROPH/DIAG IV INF INIT: CPT | Mod: 59

## 2022-03-07 PROCEDURE — 36415 COLL VENOUS BLD VENIPUNCTURE: CPT | Performed by: PHYSICIAN ASSISTANT

## 2022-03-07 PROCEDURE — 250N000011 HC RX IP 250 OP 636: Performed by: PHYSICIAN ASSISTANT

## 2022-03-07 PROCEDURE — 258N000003 HC RX IP 258 OP 636: Performed by: EMERGENCY MEDICINE

## 2022-03-07 PROCEDURE — 36415 COLL VENOUS BLD VENIPUNCTURE: CPT | Performed by: STUDENT IN AN ORGANIZED HEALTH CARE EDUCATION/TRAINING PROGRAM

## 2022-03-07 PROCEDURE — 87040 BLOOD CULTURE FOR BACTERIA: CPT | Performed by: PHYSICIAN ASSISTANT

## 2022-03-07 PROCEDURE — 82310 ASSAY OF CALCIUM: CPT | Performed by: PHYSICIAN ASSISTANT

## 2022-03-07 PROCEDURE — 83605 ASSAY OF LACTIC ACID: CPT | Performed by: PHYSICIAN ASSISTANT

## 2022-03-07 PROCEDURE — 85014 HEMATOCRIT: CPT | Performed by: PHYSICIAN ASSISTANT

## 2022-03-07 PROCEDURE — 258N000003 HC RX IP 258 OP 636: Performed by: STUDENT IN AN ORGANIZED HEALTH CARE EDUCATION/TRAINING PROGRAM

## 2022-03-07 PROCEDURE — 96367 TX/PROPH/DG ADDL SEQ IV INF: CPT

## 2022-03-07 PROCEDURE — 99223 1ST HOSP IP/OBS HIGH 75: CPT | Performed by: INTERNAL MEDICINE

## 2022-03-07 PROCEDURE — C9803 HOPD COVID-19 SPEC COLLECT: HCPCS

## 2022-03-07 PROCEDURE — 81001 URINALYSIS AUTO W/SCOPE: CPT | Performed by: PHYSICIAN ASSISTANT

## 2022-03-07 PROCEDURE — 250N000013 HC RX MED GY IP 250 OP 250 PS 637: Performed by: PHYSICIAN ASSISTANT

## 2022-03-07 PROCEDURE — 72148 MRI LUMBAR SPINE W/O DYE: CPT

## 2022-03-07 PROCEDURE — 72156 MRI NECK SPINE W/O & W/DYE: CPT

## 2022-03-07 PROCEDURE — 250N000011 HC RX IP 250 OP 636: Performed by: STUDENT IN AN ORGANIZED HEALTH CARE EDUCATION/TRAINING PROGRAM

## 2022-03-07 RX ORDER — TRIAMTERENE/HYDROCHLOROTHIAZID 37.5-25 MG
1 TABLET ORAL DAILY
Status: ON HOLD | COMMUNITY
End: 2022-04-15

## 2022-03-07 RX ORDER — AMOXICILLIN 250 MG
2 CAPSULE ORAL 2 TIMES DAILY PRN
Status: DISCONTINUED | OUTPATIENT
Start: 2022-03-07 | End: 2022-03-13 | Stop reason: HOSPADM

## 2022-03-07 RX ORDER — PROCHLORPERAZINE MALEATE 5 MG
5 TABLET ORAL EVERY 6 HOURS PRN
Status: DISCONTINUED | OUTPATIENT
Start: 2022-03-07 | End: 2022-03-13 | Stop reason: HOSPADM

## 2022-03-07 RX ORDER — AZITHROMYCIN 500 MG/5ML
500 INJECTION, POWDER, LYOPHILIZED, FOR SOLUTION INTRAVENOUS EVERY 24 HOURS
Status: COMPLETED | OUTPATIENT
Start: 2022-03-07 | End: 2022-03-08

## 2022-03-07 RX ORDER — ONDANSETRON 4 MG/1
4 TABLET, ORALLY DISINTEGRATING ORAL EVERY 6 HOURS PRN
Status: DISCONTINUED | OUTPATIENT
Start: 2022-03-07 | End: 2022-03-13 | Stop reason: HOSPADM

## 2022-03-07 RX ORDER — HYDROMORPHONE HCL IN WATER/PF 6 MG/30 ML
0.2 PATIENT CONTROLLED ANALGESIA SYRINGE INTRAVENOUS EVERY 4 HOURS PRN
Status: DISCONTINUED | OUTPATIENT
Start: 2022-03-07 | End: 2022-03-08 | Stop reason: ALTCHOICE

## 2022-03-07 RX ORDER — LIDOCAINE 4 G/G
3 PATCH TOPICAL DAILY PRN
Status: DISCONTINUED | OUTPATIENT
Start: 2022-03-07 | End: 2022-03-10

## 2022-03-07 RX ORDER — LAMOTRIGINE 100 MG/1
100 TABLET ORAL 2 TIMES DAILY
Status: DISCONTINUED | OUTPATIENT
Start: 2022-03-07 | End: 2022-03-13 | Stop reason: HOSPADM

## 2022-03-07 RX ORDER — NALOXONE HYDROCHLORIDE 0.4 MG/ML
0.4 INJECTION, SOLUTION INTRAMUSCULAR; INTRAVENOUS; SUBCUTANEOUS
Status: DISCONTINUED | OUTPATIENT
Start: 2022-03-07 | End: 2022-03-13 | Stop reason: HOSPADM

## 2022-03-07 RX ORDER — SODIUM CHLORIDE 9 MG/ML
INJECTION, SOLUTION INTRAVENOUS CONTINUOUS
Status: DISCONTINUED | OUTPATIENT
Start: 2022-03-07 | End: 2022-03-10

## 2022-03-07 RX ORDER — LORAZEPAM 2 MG/ML
1 INJECTION INTRAMUSCULAR ONCE
Status: COMPLETED | OUTPATIENT
Start: 2022-03-07 | End: 2022-03-07

## 2022-03-07 RX ORDER — OXYCODONE AND ACETAMINOPHEN 5; 325 MG/1; MG/1
1 TABLET ORAL EVERY 4 HOURS PRN
Status: ON HOLD | COMMUNITY
End: 2022-03-12

## 2022-03-07 RX ORDER — NALOXONE HYDROCHLORIDE 0.4 MG/ML
0.2 INJECTION, SOLUTION INTRAMUSCULAR; INTRAVENOUS; SUBCUTANEOUS
Status: DISCONTINUED | OUTPATIENT
Start: 2022-03-07 | End: 2022-03-13 | Stop reason: HOSPADM

## 2022-03-07 RX ORDER — OXYBUTYNIN CHLORIDE 5 MG/1
10 TABLET, EXTENDED RELEASE ORAL AT BEDTIME
Status: DISCONTINUED | OUTPATIENT
Start: 2022-03-07 | End: 2022-03-13 | Stop reason: HOSPADM

## 2022-03-07 RX ORDER — HYDROMORPHONE HCL IN WATER/PF 6 MG/30 ML
0.4 PATIENT CONTROLLED ANALGESIA SYRINGE INTRAVENOUS EVERY 4 HOURS PRN
Status: DISCONTINUED | OUTPATIENT
Start: 2022-03-07 | End: 2022-03-07

## 2022-03-07 RX ORDER — PIPERACILLIN SODIUM, TAZOBACTAM SODIUM 3; .375 G/15ML; G/15ML
3.38 INJECTION, POWDER, LYOPHILIZED, FOR SOLUTION INTRAVENOUS ONCE
Status: COMPLETED | OUTPATIENT
Start: 2022-03-07 | End: 2022-03-07

## 2022-03-07 RX ORDER — DULOXETIN HYDROCHLORIDE 60 MG/1
60 CAPSULE, DELAYED RELEASE ORAL DAILY
Status: DISCONTINUED | OUTPATIENT
Start: 2022-03-07 | End: 2022-03-13 | Stop reason: HOSPADM

## 2022-03-07 RX ORDER — GABAPENTIN 100 MG/1
200 CAPSULE ORAL 2 TIMES DAILY
Status: DISCONTINUED | OUTPATIENT
Start: 2022-03-07 | End: 2022-03-13 | Stop reason: HOSPADM

## 2022-03-07 RX ORDER — GABAPENTIN 100 MG/1
100 CAPSULE ORAL DAILY
Status: DISCONTINUED | OUTPATIENT
Start: 2022-03-07 | End: 2022-03-07

## 2022-03-07 RX ORDER — CEFTRIAXONE 2 G/1
2 INJECTION, POWDER, FOR SOLUTION INTRAMUSCULAR; INTRAVENOUS EVERY 24 HOURS
Status: DISCONTINUED | OUTPATIENT
Start: 2022-03-07 | End: 2022-03-12

## 2022-03-07 RX ORDER — LIDOCAINE 40 MG/G
CREAM TOPICAL
Status: DISCONTINUED | OUTPATIENT
Start: 2022-03-07 | End: 2022-03-13 | Stop reason: HOSPADM

## 2022-03-07 RX ORDER — DEXTROSE MONOHYDRATE 25 G/50ML
25-50 INJECTION, SOLUTION INTRAVENOUS
Status: DISCONTINUED | OUTPATIENT
Start: 2022-03-07 | End: 2022-03-13 | Stop reason: HOSPADM

## 2022-03-07 RX ORDER — GADOBUTROL 604.72 MG/ML
8 INJECTION INTRAVENOUS ONCE
Status: COMPLETED | OUTPATIENT
Start: 2022-03-07 | End: 2022-03-07

## 2022-03-07 RX ORDER — PROCHLORPERAZINE 25 MG
12.5 SUPPOSITORY, RECTAL RECTAL EVERY 12 HOURS PRN
Status: DISCONTINUED | OUTPATIENT
Start: 2022-03-07 | End: 2022-03-13 | Stop reason: HOSPADM

## 2022-03-07 RX ORDER — LIDOCAINE 4 G/G
1 PATCH TOPICAL
Status: COMPLETED | OUTPATIENT
Start: 2022-03-07 | End: 2022-03-08

## 2022-03-07 RX ORDER — ONDANSETRON 2 MG/ML
4 INJECTION INTRAMUSCULAR; INTRAVENOUS EVERY 6 HOURS PRN
Status: DISCONTINUED | OUTPATIENT
Start: 2022-03-07 | End: 2022-03-13 | Stop reason: HOSPADM

## 2022-03-07 RX ORDER — TRIAMTERENE/HYDROCHLOROTHIAZID 37.5-25 MG
1 TABLET ORAL DAILY
Status: DISCONTINUED | OUTPATIENT
Start: 2022-03-08 | End: 2022-03-13 | Stop reason: HOSPADM

## 2022-03-07 RX ORDER — PANTOPRAZOLE SODIUM 20 MG/1
40 TABLET, DELAYED RELEASE ORAL
Status: DISCONTINUED | OUTPATIENT
Start: 2022-03-07 | End: 2022-03-13 | Stop reason: HOSPADM

## 2022-03-07 RX ORDER — AMOXICILLIN 250 MG
1 CAPSULE ORAL 2 TIMES DAILY PRN
Status: DISCONTINUED | OUTPATIENT
Start: 2022-03-07 | End: 2022-03-13 | Stop reason: HOSPADM

## 2022-03-07 RX ORDER — NICOTINE POLACRILEX 4 MG
15-30 LOZENGE BUCCAL
Status: DISCONTINUED | OUTPATIENT
Start: 2022-03-07 | End: 2022-03-13 | Stop reason: HOSPADM

## 2022-03-07 RX ORDER — ATORVASTATIN CALCIUM 10 MG/1
10 TABLET, FILM COATED ORAL AT BEDTIME
Status: DISCONTINUED | OUTPATIENT
Start: 2022-03-07 | End: 2022-03-13 | Stop reason: HOSPADM

## 2022-03-07 RX ADMIN — ATORVASTATIN CALCIUM 10 MG: 10 TABLET, FILM COATED ORAL at 20:22

## 2022-03-07 RX ADMIN — GADOBUTROL 8 ML: 604.72 INJECTION INTRAVENOUS at 23:26

## 2022-03-07 RX ADMIN — LORAZEPAM 1 MG: 2 INJECTION INTRAMUSCULAR; INTRAVENOUS at 13:55

## 2022-03-07 RX ADMIN — SODIUM CHLORIDE: 9 INJECTION, SOLUTION INTRAVENOUS at 20:25

## 2022-03-07 RX ADMIN — CEFTRIAXONE SODIUM 2 G: 2 INJECTION, POWDER, FOR SOLUTION INTRAMUSCULAR; INTRAVENOUS at 20:16

## 2022-03-07 RX ADMIN — VANCOMYCIN HYDROCHLORIDE 2000 MG: 5 INJECTION, POWDER, LYOPHILIZED, FOR SOLUTION INTRAVENOUS at 17:45

## 2022-03-07 RX ADMIN — PIPERACILLIN AND TAZOBACTAM 3.38 G: 3; .375 INJECTION, POWDER, LYOPHILIZED, FOR SOLUTION INTRAVENOUS at 16:17

## 2022-03-07 RX ADMIN — PREDNISONE 6 MG: 1 TABLET ORAL at 20:22

## 2022-03-07 RX ADMIN — GABAPENTIN 200 MG: 100 CAPSULE ORAL at 20:22

## 2022-03-07 RX ADMIN — LIDOCAINE 1 PATCH: 246 PATCH TOPICAL at 14:52

## 2022-03-07 RX ADMIN — HYDROMORPHONE HYDROCHLORIDE 0.2 MG: 0.2 INJECTION, SOLUTION INTRAMUSCULAR; INTRAVENOUS; SUBCUTANEOUS at 21:33

## 2022-03-07 RX ADMIN — LAMOTRIGINE 100 MG: 100 TABLET ORAL at 20:36

## 2022-03-07 RX ADMIN — DULOXETINE HYDROCHLORIDE 60 MG: 60 CAPSULE, DELAYED RELEASE PELLETS ORAL at 20:22

## 2022-03-07 RX ADMIN — SODIUM CHLORIDE 1000 ML: 9 INJECTION, SOLUTION INTRAVENOUS at 15:40

## 2022-03-07 RX ADMIN — LIDOCAINE 1 PATCH: 246 PATCH TOPICAL at 20:35

## 2022-03-07 RX ADMIN — OXYBUTYNIN CHLORIDE 10 MG: 5 TABLET, FILM COATED, EXTENDED RELEASE ORAL at 20:36

## 2022-03-07 ASSESSMENT — ACTIVITIES OF DAILY LIVING (ADL)
TOILETING_ASSISTANCE: TOILETING DIFFICULTY, ASSISTANCE 1 PERSON
TRANSFERRING: 0-->INDEPENDENT
TRANSFERRING: 0-->INDEPENDENT
DIFFICULTY_COMMUNICATING: NO
CONCENTRATING,_REMEMBERING_OR_MAKING_DECISIONS_DIFFICULTY: NO
TOILETING: 0-->INDEPENDENT
CHANGE_IN_FUNCTIONAL_STATUS_SINCE_ONSET_OF_CURRENT_ILLNESS/INJURY: YES
ADLS_ACUITY_SCORE: 9
DIFFICULTY_EATING/SWALLOWING: NO
ADLS_ACUITY_SCORE: 14
ADLS_ACUITY_SCORE: 9
TOILETING_ISSUES: YES
DOING_ERRANDS_INDEPENDENTLY_DIFFICULTY: NO
DRESSING/BATHING_DIFFICULTY: NO
TOILETING: 0-->INDEPENDENT
WEAR_GLASSES_OR_BLIND: NO
WALKING_OR_CLIMBING_STAIRS: AMBULATION DIFFICULTY, REQUIRES EQUIPMENT
ADLS_ACUITY_SCORE: 9
DEPENDENT_IADLS:: CLEANING;COOKING;LAUNDRY;SHOPPING;MEAL PREPARATION;MEDICATION MANAGEMENT;TRANSPORTATION
ADLS_ACUITY_SCORE: 12
EQUIPMENT_CURRENTLY_USED_AT_HOME: CANE, STRAIGHT
ADLS_ACUITY_SCORE: 9
WALKING_OR_CLIMBING_STAIRS_DIFFICULTY: NO
FALL_HISTORY_WITHIN_LAST_SIX_MONTHS: NO

## 2022-03-07 ASSESSMENT — ENCOUNTER SYMPTOMS
TREMORS: 0
LIGHT-HEADEDNESS: 0
SPEECH DIFFICULTY: 0
NAUSEA: 0
CHEST TIGHTNESS: 0
FATIGUE: 1
DIFFICULTY URINATING: 0
NUMBNESS: 0
FEVER: 0
COLOR CHANGE: 0
DIZZINESS: 0
ACTIVITY CHANGE: 0
FLANK PAIN: 0
SORE THROAT: 0
DYSURIA: 0
VOICE CHANGE: 0
VOMITING: 0
HEMATURIA: 0
FREQUENCY: 1
ARTHRALGIAS: 1
WHEEZING: 0
AGITATION: 0
ADENOPATHY: 0
SHORTNESS OF BREATH: 1
WOUND: 0
HEADACHES: 0
COUGH: 1
PALPITATIONS: 0
ABDOMINAL PAIN: 0
CONFUSION: 0
MYALGIAS: 0
FACIAL SWELLING: 0
WEAKNESS: 1
NERVOUS/ANXIOUS: 0

## 2022-03-07 NOTE — ED PROVIDER NOTES
"ED CONSULTATION  Date/Time:3/7/2022 3:39 PM    I am seeing this patient along with Gaby Black PA-C.  I, Verito Ladd MD have reviewed the documentation, personally taken the patient's history, performed an exam and agree with the physical finds, diagnosis and management plan.    HPI: Tonya Yang is a 81 year old female who presents to the ED with lower back pain. The patient reports progressively worsening back pain since September following an emotional stressful event. She was using a cane, but progressed to using a walker due to her pain. She is now bedridden due to her pain. She is unable to make it to the bathroom in time due to her pain. She denies any new specific traumas contributing to her worsening back pain. She has had a cough for the last few weeks. She had an MRI with [facility] and had cervical injections last week, and she is due for upcoming spinal injections. Denies fever, chills, diaphoresis, dysuria, abdominal pain, or any other complaints at this time.    The creation of this record is based on the scribe s observations of the work being performed by Verito Ladd MD and the provider s statements to them. It was created on her behalf by Harika Barksdale, a trained medical scribe. This document has been checked and approved by the attending provider.    Physical Exam:BP (!) 162/67   Pulse 93   Temp 98.7  F (37.1  C) (Oral)   Resp 18   Ht 1.651 m (5' 5\")   Wt 83.9 kg (185 lb)   LMP  (LMP Unknown)   SpO2 91%   BMI 30.79 kg/m    Constitutional: Well developed, Well nourished, NAD  HENT: Normocephalic, Atraumatic, Bilateral external ears normal, Oropharynx normal, mucous membranes moist, Nose normal.   Neck- Normal range of motion, No tenderness, Supple, No stridor.  Eyes: PERRL, EOMI, Conjunctiva normal, No discharge.   Respiratory: Normal breath sounds, No respiratory distress  Cardiovascular: Normal heart rate, Regular rhythm  GI: Bowel sounds normal, Soft, No tenderness, "   Musculoskeletal: No edema. Good range of motion in all major joints. No tenderness to palpation or major deformities noted.   Integument: Warm, Dry, No erythema, No rash  Neurologic: Alert & oriented x 3, Normal motor function, Normal sensory function, No focal deficits noted.   Psychiatric: Affect normal, Judgment normal, Mood normal.     MDM: Lower back pain that has progressively worsened over the last few months.  At this point, the patient states she minimally gets out of bed.  She has a difficult time making it to the bathroom.  She has had previous MRI of her lumbar spine at [facility] recently.  Patient also found to have a leukocytosis, chest x-ray is consistent with pneumonia.  Patient does admit that she has had a cough over the last few weeks.  Patient will be admitted for worsening back pain and management of pneumonia.    Results for orders placed or performed during the hospital encounter of 03/07/22   Chest XR,  PA & LAT    Impression    IMPRESSION:     There is focal consolidation in the medial right lower lobe consistent with pneumonia. Left lung remains well-expanded without airspace opacity.    Normal heart and mediastinal contours.    No pleural effusion or pneumothorax.   Glucose by meter   Result Value Ref Range    GLUCOSE BY METER POCT 251 (H) 70 - 99 mg/dL   CBC with platelets   Result Value Ref Range    WBC Count 26.2 (H) 4.0 - 11.0 10e3/uL    RBC Count 4.69 3.80 - 5.20 10e6/uL    Hemoglobin 10.4 (L) 11.7 - 15.7 g/dL    Hematocrit 37.8 35.0 - 47.0 %    MCV 81 78 - 100 fL    MCH 22.2 (L) 26.5 - 33.0 pg    MCHC 27.5 (L) 31.5 - 36.5 g/dL    RDW 16.8 (H) 10.0 - 15.0 %    Platelet Count 326 150 - 450 10e3/uL   Basic metabolic panel   Result Value Ref Range    Sodium 139 136 - 145 mmol/L    Potassium 3.8 3.5 - 5.0 mmol/L    Chloride 100 98 - 107 mmol/L    Carbon Dioxide (CO2) 25 22 - 31 mmol/L    Anion Gap 14 5 - 18 mmol/L    Urea Nitrogen 23 8 - 28 mg/dL    Creatinine 1.07 0.60 - 1.10 mg/dL     Calcium 9.0 8.5 - 10.5 mg/dL    Glucose 282 (H) 70 - 125 mg/dL    GFR Estimate 53 (L) >60 mL/min/1.73m2   UA reflex to Microscopic and Culture    Specimen: Urine, Catheter   Result Value Ref Range    Color Urine Light Yellow Colorless, Straw, Light Yellow, Yellow    Appearance Urine Clear Clear    Glucose Urine >1000 (A) Negative mg/dL    Bilirubin Urine Negative Negative    Ketones Urine Trace (A) Negative mg/dL    Specific Gravity Urine 1.036 (H) 1.001 - 1.030    Blood Urine Negative Negative    pH Urine 5.0 5.0 - 7.0    Protein Albumin Urine Negative Negative mg/dL    Urobilinogen Urine <2.0 <2.0 mg/dL    Nitrite Urine Negative Negative    Leukocyte Esterase Urine Negative Negative    RBC Urine 1 <=2 /HPF    WBC Urine 1 <=5 /HPF    Squamous Epithelials Urine 1 <=1 /HPF   Erythrocyte sedimentation rate auto   Result Value Ref Range    Erythrocyte Sedimentation Rate 5 0 - 20 mm/hr   CRP inflammation   Result Value Ref Range    CRP 3.5 (H) 0.0-<0.8 mg/dL   Lactic acid whole blood   Result Value Ref Range    Lactic Acid 2.5 (H) 0.7 - 2.0 mmol/L   Asymptomatic COVID-19 Virus (Coronavirus) by PCR Nasopharyngeal    Specimen: Nasopharyngeal; Swab   Result Value Ref Range    SARS CoV2 PCR Negative Negative         1. Pneumonia of right lower lobe due to infectious organism          Current Discharge Medication List          Final disposition will be per the depending diagnostic studies and patient's clinical trajectory.    This is an accurate record of my words and actions during this visit as documented by the scribe.        Verito Ladd MD  03/07/22 1926

## 2022-03-07 NOTE — PHARMACY-VANCOMYCIN DOSING SERVICE
Pharmacy Vancomycin Initial Note  Date of Service 2022  Patient's  1940  81 year old, female    Indication: Sepsis    Current estimated CrCl = Estimated Creatinine Clearance: 45.6 mL/min (based on SCr of 1.07 mg/dL).    Creatinine for last 3 days  3/7/2022:  1:54 PM Creatinine 1.07 mg/dL    Recent Vancomycin Level(s) for last 3 days  No results found for requested labs within last 72 hours.      Vancomycin IV Administrations (past 72 hours)      No vancomycin orders with administrations in past 72 hours.                Nephrotoxins and other renal medications (From now, onward)    Start     Dose/Rate Route Frequency Ordered Stop    22 1630  piperacillin-tazobactam (ZOSYN) 3.375 g vial to attach to  mL bag         3.375 g  over 30 Minutes Intravenous ONCE 22 1603      22 1630  vancomycin 2000 mg in 0.9% NaCl 500 ml intermittent infusion 2,000 mg         2,000 mg  over 2 Hours Intravenous ONCE 22 1608            Contrast Orders - past 72 hours (72h ago, onward)            None                  Plan:  1. Start vancomycin  2000 mg IV x 1 in ED.   2. Please order pharmacy to dose consult if Vancomycin to continue as inpatient.    Otoniel Cook [facility]

## 2022-03-07 NOTE — ED PROVIDER NOTES
EMERGENCY DEPARTMENT ENCOUNTER      NAME: Tonya Yang  AGE: 81 year old female  YOB: 1940  MRN: [id number]  EVALUATION DATE & TIME: 3/7/2022 12:34 PM    PCP: Ananya Mclean    ED PROVIDER: Gaby Black PA-C      Chief Complaint   Patient presents with     Back Pain     Leg Pain       FINAL IMPRESSION:  1. Pneumonia of right lower lobe due to infectious organism    2. Sepsis, due to unspecified organism, unspecified whether acute organ dysfunction present (H)    3. Weakness of right lower extremity          MEDICAL DECISION MAKING:    Pertinent Labs & Imaging studies reviewed. (See chart for details)  81 year old female with a h/o DM2, HTN, CKD, spinal stenosis, UTI, liver abscess presents to the Emergency Department for evaluation of increasing back pain, urinary urgency, leg weakness.     On exam she is uncomfortable appearing but alert and nontoxic. She is quite hard of hearing.  Vital signs are notable for initial tachycardia into the 100s, oxygen saturation in the low 90s.  No abdominal tenderness.  Low back tenderness to palpation.  Right lower extremity weakness without effort against gravity, patient states this has been ongoing for several days to weeks.  She has been relying on her walker.  She has not had any recent falls.  Overall feels fatigued.  She does report her last epidural injection in neck 1 week ago, no cervical pain, upper extremity numbness or weakness.  No cauda equina symptoms.    Differential diagnosis includes deconditioning, progression of spinal stenosis, cauda equina syndrome, electrolyte derangement, sepsis due to pneumonia, UTI, cellulitis.  CBC is notable for leukocytosis of 26.2.  BMP WNL.  Lactic acid is elevated 2.5, sepsis pathway was initiated including empiric antibiotics with vancomycin and Zosyn.  Blood cultures were obtained prior to antibiotics.  CRP elevated 3.5.  UA does not have any nitrates, leukocytes; this is a straight cath sample.   Covid PCR is negative.  Chest x-ray does show right mid consolidation concerning for community-acquired pneumonia.    Findings were discussed with the patient and her son, will plan for admission for pneumonia treatment, PT/OT eval and possibly TCU placement as patient lives alone. Additionally, she was planned to have spinal epidural steroid injections in 9 days time.  Emergent MRI not indicated at this time, appreciate spine/neurology input.  This was discussed with the hospitalist who agrees to admission to general medicine.  Please see inpatient notes for further findings and course.    CRITICAL CARE: 35 minutes of critical care time excluding procedures were spent in evaluation, management and coordination of care of this critically ill patient (excluding procedures).  This time was spent with frequent reevaluations of the patients clinical status, counseling about treatment plan, discussing with consultanting physicians, and performing documentation    ED COURSE  12:45 PM  Met and evaluated patient. Discussed ED plan.   2:15 PM Staffed the patient with Dr. Verito Ladd  5:00 PM XR concerning for pneumonia  5:45 PM discussed with hospitalist who agrees with admission, they will be admitted to general medicine    MEDICATIONS GIVEN IN THE EMERGENCY:  Medications   Lidocaine (LIDOCARE) 4 % Patch 1 patch (1 patch Transdermal Patch/Med Applied 3/7/22 3961)   naloxone (NARCAN) injection 0.2 mg (has no administration in time range)     Or   naloxone (NARCAN) injection 0.4 mg (has no administration in time range)     Or   naloxone (NARCAN) injection 0.2 mg (has no administration in time range)     Or   naloxone (NARCAN) injection 0.4 mg (has no administration in time range)   cefTRIAXone (ROCEPHIN) 2 g vial to attach to  ml bag for ADULTS or NS 50 ml bag for PEDS (has no administration in time range)   azithromycin 500 mg (ZITHROMAX) in 0.9% NaCl 250 mL intermittent infusion 500 mg (has no administration in  time range)   azithromycin (ZITHROMAX) 250 mg in sodium chloride 0.9 % 250 mL intermittent infusion (has no administration in time range)   lidocaine 1 % 0.1-1 mL (has no administration in time range)   lidocaine (LMX4) cream (has no administration in time range)   sodium chloride (PF) 0.9% PF flush 3 mL (has no administration in time range)   sodium chloride (PF) 0.9% PF flush 3 mL (has no administration in time range)   melatonin tablet 1 mg (has no administration in time range)   sodium chloride 0.9% infusion (has no administration in time range)   HYDROmorphone (DILAUDID) injection 0.2 mg (has no administration in time range)   senna-docusate (SENOKOT-S/PERICOLACE) 8.6-50 MG per tablet 1 tablet (has no administration in time range)     Or   senna-docusate (SENOKOT-S/PERICOLACE) 8.6-50 MG per tablet 2 tablet (has no administration in time range)   prochlorperazine (COMPAZINE) injection 5 mg (has no administration in time range)     Or   prochlorperazine (COMPAZINE) tablet 5 mg (has no administration in time range)     Or   prochlorperazine (COMPAZINE) suppository 12.5 mg (has no administration in time range)   ondansetron (ZOFRAN-ODT) ODT tab 4 mg (has no administration in time range)     Or   ondansetron (ZOFRAN) injection 4 mg (has no administration in time range)   glucose gel 15-30 g (has no administration in time range)     Or   dextrose 50 % injection 25-50 mL (has no administration in time range)     Or   glucagon injection 1 mg (has no administration in time range)   insulin aspart (NovoLOG) injection (RAPID ACTING) (has no administration in time range)   insulin aspart (NovoLOG) injection (RAPID ACTING) (has no administration in time range)   LORazepam (ATIVAN) injection 1 mg (1 mg Intravenous Given 3/7/22 1355)   0.9% sodium chloride BOLUS (1,000 mLs Intravenous New Bag 3/7/22 1540)   piperacillin-tazobactam (ZOSYN) 3.375 g vial to attach to  mL bag (0 g Intravenous Stopped 3/7/22 1650)    vancomycin 2000 mg in 0.9% NaCl 500 ml intermittent infusion 2,000 mg (2,000 mg Intravenous New Bag 3/7/22 0095)       NEW PRESCRIPTIONS STARTED AT TODAY'S ER VISIT  Current Discharge Medication List             =================================================================    HPI    Patient information was obtained from: patient, son    Use of Intrepreter: N/A     Tonya Yang is a 81 year old female who lives independently, presents via EMS for concern of fatigue/weakness.  She does have known spinal stenosis and is planned to have steroid epidural injections in 9 days time.  She notes that she has become progressively more weak and is having difficulty moving around her house.  She has not had any recent falls.  Her back pain is increasing, she states that she feels general myalgias.  She is not had any fevers.  She is quite concerned that she missed a dose of Metformin and this is causing her glucose to be elevated.  She did recently complete a course of steroids for her ongoing back pain.  Patient and son report that she had an MRI with [facility] neurology couple weeks ago.    She has had a more productive cough recently.  She states that she has had some urinary urgency and has been having some incontinence.  She is very clear that this is due to inability to make it to the bathroom in time, and not loss of urinary control.  She is not had any fecal incontinence.  She does not have any groin numbness.  Denies chest pain, abdominal pain.      REVIEW OF SYSTEMS   Review of Systems   Constitutional: Positive for fatigue. Negative for activity change and fever.   HENT: Negative for facial swelling, sore throat and voice change.    Eyes: Negative for visual disturbance.   Respiratory: Positive for cough and shortness of breath. Negative for chest tightness and wheezing.    Cardiovascular: Negative for chest pain and palpitations.   Gastrointestinal: Negative for abdominal pain, nausea and vomiting.    Genitourinary: Positive for frequency. Negative for difficulty urinating, dysuria, flank pain and hematuria.   Musculoskeletal: Positive for arthralgias and gait problem (bilateral leg weakness). Negative for myalgias.   Skin: Negative for color change, pallor, rash and wound.   Neurological: Positive for weakness (RLE). Negative for dizziness, tremors, speech difficulty, light-headedness, numbness and headaches.   Hematological: Negative for adenopathy.   Psychiatric/Behavioral: Negative for agitation, behavioral problems and confusion. The patient is not nervous/anxious.    All other systems reviewed and are negative.        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     Hepatic abscess      Hyperlipidemia      Hypertension      Irritable bowel syndrome      Meniere disease      Neuropathy      Noninfectious ileitis      Peritoneal abscess (H)      Spinal stenosis of lumbar region      Steroid long-term use      Vaginitis, atrophic, postmenopausal      Vitamin D deficiency        PAST SURGICAL HISTORY:  Past Surgical History:   Procedure Laterality Date     CATARACT IOL, RT/LT Left 7/2013     FOOT SURGERY      toe     GI SURGERY       LAPAROSCOPIC HEPATECTOMY PARTIAL  11/4/2013    Procedure: LAPAROSCOPIC HEPATECTOMY PARTIAL;  Laparoscopic Debridement of Liver Abcess;  Surgeon: Sepideh Green MD;  Location: [facility] OR     ORTHOPEDIC SURGERY       PICC INSERTION  8/28/2013    5fr DL Power PICC, 41cm (1cm external length) in the R lateral brachial vein with tip in the SVC RA junction.     RECTAL SURGERY      1970s      VASCULAR SURGERY             CURRENT MEDICATIONS:    No current outpatient medications on file.      ALLERGIES:  Allergies   Allergen Reactions     Propofol Nausea and Vomiting     Shellfish Allergy      Other reaction(s): Dizziness       FAMILY HISTORY:  Family History   Problem Relation Age of Onset     Cerebrovascular Disease Mother      Heart Disease Mother      Heart Disease Father      Heart Disease Brother      Diabetes No family hx of      Coronary Artery Disease No family hx of      Hypertension No family hx of      Hyperlipidemia No family hx of      Breast Cancer No family hx of      Colon Cancer No family hx of      Prostate Cancer No family hx of      Other Cancer No family hx of      Depression No family hx of      Anxiety Disorder No family hx of      Mental Illness No family hx of      Substance Abuse No family hx of      Anesthesia Reaction No family hx of      Asthma No family hx of      Osteoporosis No family hx of      Genetic Disorder No family hx of      Thyroid Disease No family hx of      Obesity No family hx of      Unknown/Adopted No family hx of        SOCIAL HISTORY:   Social History     Socioeconomic History     Marital status:      Spouse name: Not on file     Number of children: Not on file     Years of education: Not on file     Highest education level: Not on file   Occupational History     Not on file   Tobacco Use     Smoking status: Former Smoker     Types: Cigarettes     Quit date: 11/15/1987     Years since quittin.3     Smokeless tobacco: Former User   Substance and Sexual Activity     Alcohol use: Not Currently     Alcohol/week: 0.0 standard drinks     Comment: MALISSA white since      Drug use: No     Comment: used marijuanna in the past     Sexual activity: Never     Partners: Male   Other Topics Concern     Parent/sibling w/ CABG, MI or angioplasty before 65F 55M? No   Social History Narrative     Not on file     Social Determinants of Health  "    Financial Resource Strain: Not on file   Food Insecurity: Not on file   Transportation Needs: Not on file   Physical Activity: Not on file   Stress: Not on file   Social Connections: Not on file   Intimate Partner Violence: Not on file   Housing Stability: Not on file         VITALS:  Patient Vitals for the past 24 hrs:   BP Temp Temp src Pulse Resp SpO2 Height Weight   03/07/22 1911 (!) 162/67 -- -- 93 -- -- -- --   03/07/22 1836 (!) 174/71 98.7  F (37.1  C) Oral 91 18 91 % -- --   03/07/22 1700 -- -- -- 91 -- 92 % -- --   03/07/22 1615 (!) 157/70 -- -- 91 -- 91 % -- --   03/07/22 1601 (!) 148/67 -- -- 93 -- 91 % -- --   03/07/22 1545 138/60 99.6  F (37.6  C) Axillary 97 -- 91 % -- --   03/07/22 1330 (!) 166/60 -- -- 93 -- 92 % -- --   03/07/22 1315 (!) 160/64 -- -- 101 -- 91 % -- --   03/07/22 1300 (!) 167/63 -- -- 102 -- 92 % -- --   03/07/22 1245 (!) 153/70 -- -- 101 -- 91 % -- --   03/07/22 1200 136/69 -- Temporal 109 20 95 % 1.651 m (5' 5\") 83.9 kg (185 lb)       PHYSICAL EXAM    Physical Exam  Vitals reviewed.   Constitutional:       General: She is not in acute distress.     Appearance: Normal appearance. She is not ill-appearing, toxic-appearing or diaphoretic.   HENT:      Head: Normocephalic and atraumatic.      Nose: No congestion.      Mouth/Throat:      Mouth: Mucous membranes are moist.      Pharynx: Oropharynx is clear.   Eyes:      General: No scleral icterus.        Right eye: No discharge.         Left eye: No discharge.   Cardiovascular:      Rate and Rhythm: Normal rate and regular rhythm.      Heart sounds: No murmur heard.  Pulmonary:      Effort: Pulmonary effort is normal. No respiratory distress.      Breath sounds: Normal breath sounds. No stridor. No wheezing or rales.   Abdominal:      General: Abdomen is flat. There is no distension.      Palpations: Abdomen is soft.      Tenderness: There is no abdominal tenderness. There is no guarding.   Musculoskeletal:         General: No " swelling or deformity. Normal range of motion.      Cervical back: Normal range of motion and neck supple. No rigidity or tenderness.      Right lower leg: No edema.      Left lower leg: No edema.      Comments: Tenderness lumbar spine, left flank with muscle spasm. Unable to raise right leg from bed. Effort against gravity on left leg. Sensation intact bilateral LE   Skin:     General: Skin is warm.      Capillary Refill: Capillary refill takes less than 2 seconds.      Coloration: Skin is not jaundiced.      Findings: No bruising, erythema, lesion or rash.   Neurological:      General: No focal deficit present.      Mental Status: She is alert and oriented to person, place, and time.      Cranial Nerves: No cranial nerve deficit.      Motor: Weakness present.   Psychiatric:         Mood and Affect: Mood normal.         Behavior: Behavior normal.          LAB:  All pertinent labs reviewed and interpreted.    Labs Ordered and Resulted from Time of ED Arrival to Time of ED Departure   GLUCOSE BY METER - Abnormal       Result Value    GLUCOSE BY METER POCT 251 (*)    CBC WITH PLATELETS - Abnormal    WBC Count 26.2 (*)     RBC Count 4.69      Hemoglobin 10.4 (*)     Hematocrit 37.8      MCV 81      MCH 22.2 (*)     MCHC 27.5 (*)     RDW 16.8 (*)     Platelet Count 326     BASIC METABOLIC PANEL - Abnormal    Sodium 139      Potassium 3.8      Chloride 100      Carbon Dioxide (CO2) 25      Anion Gap 14      Urea Nitrogen 23      Creatinine 1.07      Calcium 9.0      Glucose 282 (*)     GFR Estimate 53 (*)    UA MACROSCOPIC WITH REFLEX TO MICRO AND CULTURE - Abnormal    Color Urine Light Yellow      Appearance Urine Clear      Glucose Urine >1000 (*)     Bilirubin Urine Negative      Ketones Urine Trace (*)     Specific Gravity Urine 1.036 (*)     Blood Urine Negative      pH Urine 5.0      Protein Albumin Urine Negative      Urobilinogen Urine <2.0      Nitrite Urine Negative      Leukocyte Esterase Urine Negative       RBC Urine 1      WBC Urine 1      Squamous Epithelials Urine 1     CRP INFLAMMATION - Abnormal    CRP 3.5 (*)    LACTIC ACID WHOLE BLOOD - Abnormal    Lactic Acid 2.5 (*)    ERYTHROCYTE SEDIMENTATION RATE AUTO - Normal    Erythrocyte Sedimentation Rate 5     GLUCOSE MONITOR NURSING POCT   BLOOD CULTURE   BLOOD CULTURE         RADIOLOGY:  Reviewed all pertinent imaging. Please see official radiology report    Chest XR,  PA & LAT   Final Result   IMPRESSION:       There is focal consolidation in the medial right lower lobe consistent with pneumonia. Left lung remains well-expanded without airspace opacity.      Normal heart and mediastinal contours.      No pleural effusion or pneumothorax.            Gaby Black PA-C  Emergency Medicine  [facility] EMERGENCY ROOM  Formerly [facility], [id number] [facility] [id number] [phone]  Dept: [phone]    This note has in part been created with speech recognition technology and may create an occasional, unintended word/grammar substitution. Errors are generally corrected in real time. Please message me via [employer] In B[employer] if you note any errors requiring clarification.       Gaby Black PA-C  03/07/22 1950       Gaby Black PA-C  03/07/22 1951

## 2022-03-07 NOTE — ED TRIAGE NOTES
Pt presents via EMS with known spinal stenosis with pain and numbness radiating radiating into her legs, worse in her left. Took an oxycodone and percocet at 9:30 without relief. Scheduled for 2 epidurals next week. Says BG is high because she didn't take her metformin. EMS stated it was 271

## 2022-03-07 NOTE — PHARMACY-ADMISSION MEDICATION HISTORY
Pharmacy Note - Admission Medication History    Pertinent Provider Information: Information obtained per discussion with patient's home health nurse (Deedee).   ______________________________________________________________________    Prior To Admission (PTA) med list completed and updated in EMR.       PTA Med List   Medication Sig Last Dose     atorvastatin (LIPITOR) 10 MG tablet Take 10 mg by mouth At Bedtime 3/6/2022 at Unknown time     Barberry-Oreg Grape-Goldenseal (BERBERINE COMPLEX PO) Take 1 tablet by mouth daily 3/6/2022 at Unknown time     cyanocobalamin 1000 MCG SUBL Place 1,000 mcg under the tongue daily 3/6/2022 at Unknown time     diazepam (VALIUM) 1 MG/ML solution Take 0.5 mLs (0.5 mg) by mouth 3 times daily (Patient taking differently: Take 0.5 mg by mouth 2 times daily as needed ) Past Week at Unknown time     DULoxetine (CYMBALTA) 60 MG EC capsule TAKE 1 CAPSULE (60 MG) BY MOUTH DAILY 3/6/2022 at Unknown time     fluconazole (DIFLUCAN) 150 MG tablet Take 150 mg by mouth See Admin Instructions One dose prn yeast infection  Past Month at Unknown time     gabapentin (NEURONTIN) 100 MG capsule Take 2 capsules (200 mg) by mouth 2 times daily 3/6/2022 at Unknown time     glipiZIDE (GLUCOTROL) 10 MG tablet Take 2 tablets (20 mg) by mouth 2 times daily (before meals) (Patient taking differently: Take 10 mg by mouth 2 times daily (before meals) ) 3/6/2022 at Unknown time     JARDIANCE 25 MG TABS tablet Take 25 mg by mouth daily  3/6/2022 at Unknown time     lamoTRIgine (LAMICTAL) 100 MG tablet Take 100 mg by mouth 2 times daily 3/6/2022 at Unknown time     Lidocaine (LIDOCARE) 4 % Patch Place 3 patches onto the skin every 24 hours To prevent lidocaine toxicity, patient should be patch free for 12 hrs daily. (Patient taking differently: Place 3 patches onto the skin daily as needed To prevent lidocaine toxicity, patient should be patch free for 12 hrs daily.) Past Month at Unknown time     metFORMIN  (GLUCOPHAGE) 500 MG tablet Take 0.5 tablets (250 mg) by mouth 2 times daily (with meals) 3/6/2022 at Unknown time     nystatin (MYCOSTATIN) 390338 UNIT/GM external powder Apply topically 2 times daily as needed Breast rash Past Month at Unknown time     omeprazole (PRILOSEC) 20 MG DR capsule Take 20 mg by mouth daily  3/6/2022 at Unknown time     oxybutynin ER (DITROPAN-XL) 10 MG 24 hr tablet Take 10 mg by mouth At Bedtime  3/6/2022 at Unknown time     oxyCODONE-acetaminophen (PERCOCET) 5-325 MG tablet Take 1 tablet by mouth every 4 hours as needed for severe pain Past Week at Unknown time     predniSONE (DELTASONE) 1 MG tablet Take 6 mg by mouth daily 1mg tab and 5mg tab 3/6/2022 at Unknown time     triamcinolone (KENALOG) 0.1 % external cream Apply topically 2 times daily as needed  Past Month at Unknown time     triamterene-HCTZ (MAXZIDE-25) 37.5-25 MG tablet Take 1 tablet by mouth daily 3/6/2022 at Unknown time     vitamin D3 (CHOLECALCIFEROL) 50 mcg (2000 units) tablet Take 3 tablets by mouth daily 3/6/2022 at Unknown time       Information source(s): Patient, Caregiver and CareEverywhere/[facility]    Method of interview communication: phone    Patient was asked about OTC/herbal products specifically.  PTA med list reflects this.    Based on the pharmacist's assessment, the PTA med list information appears reliable    Allergies were reviewed, assessed, and updated with the patient.      Patient did not bring any medications to the hospital and can't retrieve from home. No multi-dose medications are available for use during hospital stay.      Thank you for the opportunity to participate in the care of this patient.      Otoniel Cook [facility]     3/7/2022     5:21 PM

## 2022-03-08 ENCOUNTER — APPOINTMENT (OUTPATIENT)
Dept: SPEECH THERAPY | Facility: CLINIC | Age: 82
DRG: 871 | End: 2022-03-08
Attending: HOSPITALIST
Payer: MEDICARE

## 2022-03-08 LAB
BASOPHILS # BLD AUTO: 0 10E3/UL (ref 0–0.2)
BASOPHILS NFR BLD AUTO: 0 %
EOSINOPHIL # BLD AUTO: 0.2 10E3/UL (ref 0–0.7)
EOSINOPHIL NFR BLD AUTO: 1 %
ERYTHROCYTE [DISTWIDTH] IN BLOOD BY AUTOMATED COUNT: 16.8 % (ref 10–15)
GLUCOSE BLDC GLUCOMTR-MCNC: 132 MG/DL (ref 70–99)
GLUCOSE BLDC GLUCOMTR-MCNC: 148 MG/DL (ref 70–99)
GLUCOSE BLDC GLUCOMTR-MCNC: 165 MG/DL (ref 70–99)
GLUCOSE BLDC GLUCOMTR-MCNC: 173 MG/DL (ref 70–99)
HBA1C MFR BLD: 8.8 %
HCT VFR BLD AUTO: 31.1 % (ref 35–47)
HGB BLD-MCNC: 8.5 G/DL (ref 11.7–15.7)
IMM GRANULOCYTES # BLD: 0.3 10E3/UL
IMM GRANULOCYTES NFR BLD: 1 %
LACTATE SERPL-SCNC: 1.5 MMOL/L (ref 0.7–2)
LYMPHOCYTES # BLD AUTO: 1.9 10E3/UL (ref 0.8–5.3)
LYMPHOCYTES NFR BLD AUTO: 10 %
MCH RBC QN AUTO: 22.1 PG (ref 26.5–33)
MCHC RBC AUTO-ENTMCNC: 27.3 G/DL (ref 31.5–36.5)
MCV RBC AUTO: 81 FL (ref 78–100)
MONOCYTES # BLD AUTO: 1.1 10E3/UL (ref 0–1.3)
MONOCYTES NFR BLD AUTO: 6 %
NEUTROPHILS # BLD AUTO: 16.1 10E3/UL (ref 1.6–8.3)
NEUTROPHILS NFR BLD AUTO: 82 %
NRBC # BLD AUTO: 0 10E3/UL
NRBC BLD AUTO-RTO: 0 /100
PLATELET # BLD AUTO: 272 10E3/UL (ref 150–450)
RBC # BLD AUTO: 3.85 10E6/UL (ref 3.8–5.2)
WBC # BLD AUTO: 19.5 10E3/UL (ref 4–11)

## 2022-03-08 PROCEDURE — 250N000011 HC RX IP 250 OP 636: Performed by: HOSPITALIST

## 2022-03-08 PROCEDURE — 36415 COLL VENOUS BLD VENIPUNCTURE: CPT | Performed by: HOSPITALIST

## 2022-03-08 PROCEDURE — 120N000001 HC R&B MED SURG/OB

## 2022-03-08 PROCEDURE — 92610 EVALUATE SWALLOWING FUNCTION: CPT | Mod: GN

## 2022-03-08 PROCEDURE — 250N000013 HC RX MED GY IP 250 OP 250 PS 637: Performed by: INTERNAL MEDICINE

## 2022-03-08 PROCEDURE — 258N000003 HC RX IP 258 OP 636: Performed by: HOSPITALIST

## 2022-03-08 PROCEDURE — 99233 SBSQ HOSP IP/OBS HIGH 50: CPT | Performed by: HOSPITALIST

## 2022-03-08 PROCEDURE — 250N000013 HC RX MED GY IP 250 OP 250 PS 637: Performed by: HOSPITALIST

## 2022-03-08 PROCEDURE — 85025 COMPLETE CBC W/AUTO DIFF WBC: CPT | Performed by: STUDENT IN AN ORGANIZED HEALTH CARE EDUCATION/TRAINING PROGRAM

## 2022-03-08 PROCEDURE — 250N000011 HC RX IP 250 OP 636: Performed by: STUDENT IN AN ORGANIZED HEALTH CARE EDUCATION/TRAINING PROGRAM

## 2022-03-08 PROCEDURE — 36415 COLL VENOUS BLD VENIPUNCTURE: CPT | Performed by: STUDENT IN AN ORGANIZED HEALTH CARE EDUCATION/TRAINING PROGRAM

## 2022-03-08 PROCEDURE — 250N000012 HC RX MED GY IP 250 OP 636 PS 637: Performed by: STUDENT IN AN ORGANIZED HEALTH CARE EDUCATION/TRAINING PROGRAM

## 2022-03-08 PROCEDURE — 92526 ORAL FUNCTION THERAPY: CPT | Mod: GN

## 2022-03-08 PROCEDURE — 258N000003 HC RX IP 258 OP 636: Performed by: STUDENT IN AN ORGANIZED HEALTH CARE EDUCATION/TRAINING PROGRAM

## 2022-03-08 PROCEDURE — 83605 ASSAY OF LACTIC ACID: CPT | Performed by: HOSPITALIST

## 2022-03-08 PROCEDURE — 250N000013 HC RX MED GY IP 250 OP 250 PS 637: Performed by: STUDENT IN AN ORGANIZED HEALTH CARE EDUCATION/TRAINING PROGRAM

## 2022-03-08 PROCEDURE — 83036 HEMOGLOBIN GLYCOSYLATED A1C: CPT | Performed by: HOSPITALIST

## 2022-03-08 RX ORDER — ACETAMINOPHEN 325 MG/1
650 TABLET ORAL 3 TIMES DAILY
Status: DISCONTINUED | OUTPATIENT
Start: 2022-03-08 | End: 2022-03-13 | Stop reason: HOSPADM

## 2022-03-08 RX ORDER — LIDOCAINE 50 MG/G
OINTMENT TOPICAL 3 TIMES DAILY
Status: DISCONTINUED | OUTPATIENT
Start: 2022-03-08 | End: 2022-03-10

## 2022-03-08 RX ORDER — OXYCODONE HYDROCHLORIDE 5 MG/1
5 TABLET ORAL 4 TIMES DAILY PRN
Status: DISCONTINUED | OUTPATIENT
Start: 2022-03-08 | End: 2022-03-13 | Stop reason: HOSPADM

## 2022-03-08 RX ADMIN — Medication 1 MG: at 00:21

## 2022-03-08 RX ADMIN — ATORVASTATIN CALCIUM 10 MG: 10 TABLET, FILM COATED ORAL at 21:25

## 2022-03-08 RX ADMIN — ACETAMINOPHEN 650 MG: 325 TABLET ORAL at 21:26

## 2022-03-08 RX ADMIN — GABAPENTIN 200 MG: 100 CAPSULE ORAL at 09:04

## 2022-03-08 RX ADMIN — PANTOPRAZOLE SODIUM 40 MG: 20 TABLET, DELAYED RELEASE ORAL at 09:04

## 2022-03-08 RX ADMIN — LAMOTRIGINE 100 MG: 100 TABLET ORAL at 10:50

## 2022-03-08 RX ADMIN — OXYCODONE HYDROCHLORIDE 5 MG: 5 TABLET ORAL at 21:27

## 2022-03-08 RX ADMIN — DULOXETINE HYDROCHLORIDE 60 MG: 60 CAPSULE, DELAYED RELEASE PELLETS ORAL at 09:04

## 2022-03-08 RX ADMIN — ACETAMINOPHEN 650 MG: 325 TABLET ORAL at 14:10

## 2022-03-08 RX ADMIN — AZITHROMYCIN MONOHYDRATE 500 MG: 500 INJECTION, POWDER, LYOPHILIZED, FOR SOLUTION INTRAVENOUS at 00:16

## 2022-03-08 RX ADMIN — PREDNISONE 6 MG: 1 TABLET ORAL at 09:04

## 2022-03-08 RX ADMIN — LAMOTRIGINE 100 MG: 100 TABLET ORAL at 21:26

## 2022-03-08 RX ADMIN — OXYCODONE HYDROCHLORIDE 5 MG: 5 TABLET ORAL at 17:28

## 2022-03-08 RX ADMIN — DIAZEPAM 1 MG: 2 TABLET ORAL at 01:00

## 2022-03-08 RX ADMIN — LIDOCAINE 1 PATCH: 246 PATCH TOPICAL at 12:39

## 2022-03-08 RX ADMIN — AZITHROMYCIN MONOHYDRATE 250 MG: 500 INJECTION, POWDER, LYOPHILIZED, FOR SOLUTION INTRAVENOUS at 20:28

## 2022-03-08 RX ADMIN — GABAPENTIN 200 MG: 100 CAPSULE ORAL at 21:26

## 2022-03-08 RX ADMIN — OXYBUTYNIN CHLORIDE 10 MG: 5 TABLET, FILM COATED, EXTENDED RELEASE ORAL at 21:25

## 2022-03-08 RX ADMIN — ENOXAPARIN SODIUM 40 MG: 100 INJECTION SUBCUTANEOUS at 17:12

## 2022-03-08 RX ADMIN — CEFTRIAXONE SODIUM 2 G: 2 INJECTION, POWDER, FOR SOLUTION INTRAMUSCULAR; INTRAVENOUS at 19:08

## 2022-03-08 RX ADMIN — SODIUM CHLORIDE: 9 INJECTION, SOLUTION INTRAVENOUS at 18:42

## 2022-03-08 RX ADMIN — HYDROMORPHONE HYDROCHLORIDE 0.2 MG: 0.2 INJECTION, SOLUTION INTRAMUSCULAR; INTRAVENOUS; SUBCUTANEOUS at 08:55

## 2022-03-08 RX ADMIN — SENNOSIDES AND DOCUSATE SODIUM 1 TABLET: 50; 8.6 TABLET ORAL at 21:26

## 2022-03-08 ASSESSMENT — ACTIVITIES OF DAILY LIVING (ADL)
ADLS_ACUITY_SCORE: 9
ADLS_ACUITY_SCORE: 9
ADLS_ACUITY_SCORE: 11
ADLS_ACUITY_SCORE: 9
ADLS_ACUITY_SCORE: 11
ADLS_ACUITY_SCORE: 9
ADLS_ACUITY_SCORE: 11
ADLS_ACUITY_SCORE: 9
ADLS_ACUITY_SCORE: 11
ADLS_ACUITY_SCORE: 9

## 2022-03-08 NOTE — PLAN OF CARE
Problem: Plan of Care - These are the overarching goals to be used throughout the patient stay.    Goal: Plan of Care Review/Shift Note  Description: The Plan of Care Review/Shift note should be completed every shift.  The Outcome Evaluation is a brief statement about your assessment that the patient is improving, declining, or no change.  This information will be displayed automatically on your shift note.  Outcome: Ongoing, Progressing     Patient anxious. Alutiiq. Assist of 2. Patient admitted for RLL pneumonia and leg weakness. Purewick utilized in ED. Bladder scan noting 990mL. Purewick relieved 450 mL's. Straight cath attempted twice without relief. Hx of back pain. Lidocaine patch and Dilaudid provided for 8/10 pain.IV NS 175mL initiated. Rocephin IV provided. Azithromycin pending IV. Stable on 2L with oxygen in the 95%%. Pending sputum culture. Patient educated on process. MRI results pending.

## 2022-03-08 NOTE — PROVIDER NOTIFICATION
"To Dr. Mishra --    \"206 (SC) -- pt failed water study/pre-screen, coughed to clear at the end; thoughts? - L\".    Provider called back and said she will place a speech consult for evaluation.  "

## 2022-03-08 NOTE — CONSULTS
"ACUPUNCTURIST TREATMENT NOTE    Name: Tonya Yang  :  1940  MRN:  [id number]    Acupuncture Treatment  Patient Type: Medical  Intervention Reason: Pain, Neuropathy  Pain Location: bilateral feet  Pre-session Pain Ratin  Post-session Pain Ratin  Neuropathy Location: bilateral feet  Pre-session Neuropathy ratin  Post-session Neuropathy ratin  Patient complaint:: chronic neuropathy in bilateral feet  Initial insertions: Du 20, (L) ST 36, (L) St 40, (L) Windy 3  Treatment Observations: Patient flexed her (L) ankle and moved and this movement caused her pain so she asked to remove the needles and stop the treatment session.  I immediately removed needles and stayed with patient until she reported the pain had subsided, I covered up her lower legs with the blanket and left her room.         \"Risks and benefits of acupuncture were discussed with patient. Consent for treatment was given. We thank you for the referral.\"     Beti Sharp L.Ac.     Date:  3/8/2022  Time:  12:11 PM    "

## 2022-03-08 NOTE — PLAN OF CARE
Goal: Optimal Comfort and Wellbeing  Outcome: Ongoing, Progressing    Patient anxious at beginning of shift, PRN valium given, this was effective. Patient appears to be sleeping comfortably in between cares after prn administration. VSS, purewick in place, adequate output. IV antibiotics given per orders, NS running at 175ml/hr. Patient is NPO for swallow study. Did not get OOB this shift, turned Q2h. Patient still  needs sputum culture, unable to get this shift.

## 2022-03-08 NOTE — PROVIDER NOTIFICATION
"To Dr. Mishar --    \"206 (SC) - pt requesting to cancel swallow study, says she has experienced this issue rarely and would rather eat/drink. Thoughts? - L\"  "

## 2022-03-08 NOTE — PROGRESS NOTES
Pt was sleeping and did not wake to my voice. Offered quiet blessing.  Spiritual Care will attempt visit tomorrow.    JOHN Cabrera.

## 2022-03-08 NOTE — CONSULTS
CC: Lower extremity weakness, bilateral neck and arm pain, bilateral back and leg pain    HPI: Ms Yang is a pleasant 81-year-old female admitted to [facility] for tachycardia and concern for pneumonia and sepsis.  Concomitant with this is acute on chronic low back pain and difficulty walking.  I have previously evaluated her in the fall for similar concerns and to rule out cauda equina syndrome at that time.  I had extensive conversations with the patient and her son regarding this history.  We have previously discussed that given her medical history that surgery is a significant risk.  In our previous discussions they wished to hold off on any surgery understanding completely the natural progression of spinal stenosis of the cervical and lumbar spine may result in her being immobilized into a wheelchair.    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     Hepatic abscess      Hyperlipidemia      Hypertension      Irritable bowel syndrome      Meniere disease      Neuropathy      Noninfectious ileitis      Peritoneal abscess (H)      Spinal stenosis of lumbar region      Steroid long-term use      Vaginitis, atrophic, postmenopausal      Vitamin D deficiency      Past Surgical History:   Procedure Laterality Date     CATARACT IOL, RT/LT Left 7/2013     FOOT SURGERY      toe     GI SURGERY       LAPAROSCOPIC HEPATECTOMY PARTIAL  11/4/2013    Procedure: LAPAROSCOPIC HEPATECTOMY PARTIAL;  Laparoscopic Debridement of Liver Abcess;  Surgeon: Sepideh Green MD;  Location: [facility] OR     ORTHOPEDIC SURGERY       PICC INSERTION  8/28/2013    5fr DL  Power PICC, 41cm (1cm external length) in the R lateral brachial vein with tip in the SVC RA junction.     RECTAL SURGERY      1970s     VASCULAR SURGERY       Current Facility-Administered Medications   Medication     atorvastatin (LIPITOR) tablet 10 mg     azithromycin (ZITHROMAX) 250 mg in sodium chloride 0.9 % 250 mL intermittent infusion     cefTRIAXone (ROCEPHIN) 2 g vial to attach to  ml bag for ADULTS or NS 50 ml bag for PEDS     glucose gel 15-30 g    Or     dextrose 50 % injection 25-50 mL    Or     glucagon injection 1 mg     diazepam (VALIUM) half-tab 1 mg     DULoxetine (CYMBALTA) DR capsule 60 mg     gabapentin (NEURONTIN) capsule 200 mg     HYDROmorphone (DILAUDID) injection 0.2 mg     insulin aspart (NovoLOG) injection (RAPID ACTING)     insulin aspart (NovoLOG) injection (RAPID ACTING)     lamoTRIgine (LaMICtal) tablet 100 mg     Lidocaine (LIDOCARE) 4 % Patch 3 patch    And     lidocaine patch in PLACE     lidocaine (LMX4) cream     lidocaine 1 % 0.1-1 mL     melatonin tablet 1 mg     naloxone (NARCAN) injection 0.2 mg    Or     naloxone (NARCAN) injection 0.4 mg    Or     naloxone (NARCAN) injection 0.2 mg    Or     naloxone (NARCAN) injection 0.4 mg     ondansetron (ZOFRAN-ODT) ODT tab 4 mg    Or     ondansetron (ZOFRAN) injection 4 mg     oxybutynin ER (DITROPAN-XL) 24 hr tablet 10 mg     pantoprazole (PROTONIX) EC tablet 40 mg     predniSONE (DELTASONE) tablet 6 mg     prochlorperazine (COMPAZINE) injection 5 mg    Or     prochlorperazine (COMPAZINE) tablet 5 mg    Or     prochlorperazine (COMPAZINE) suppository 12.5 mg     senna-docusate (SENOKOT-S/PERICOLACE) 8.6-50 MG per tablet 1 tablet    Or     senna-docusate (SENOKOT-S/PERICOLACE) 8.6-50 MG per tablet 2 tablet     sodium chloride (PF) 0.9% PF flush 3 mL     sodium chloride (PF) 0.9% PF flush 3 mL     sodium chloride 0.9% infusion     [Held by provider] triamterene-HCTZ (MAXZIDE-25) 37.5-25 MG per tablet 1 tablet        Allergies    Allergen Reactions     Propofol Nausea and Vomiting     Shellfish Allergy      Other reaction(s): Dizziness     Family History   Problem Relation Age of Onset     Cerebrovascular Disease Mother      Heart Disease Mother      Heart Disease Father      Heart Disease Brother      Diabetes No family hx of      Coronary Artery Disease No family hx of      Hypertension No family hx of      Hyperlipidemia No family hx of      Breast Cancer No family hx of      Colon Cancer No family hx of      Prostate Cancer No family hx of      Other Cancer No family hx of      Depression No family hx of      Anxiety Disorder No family hx of      Mental Illness No family hx of      Substance Abuse No family hx of      Anesthesia Reaction No family hx of      Asthma No family hx of      Osteoporosis No family hx of      Genetic Disorder No family hx of      Thyroid Disease No family hx of      Obesity No family hx of      Unknown/Adopted No family hx of      Social History     Socioeconomic History     Marital status:      Spouse name: Not on file     Number of children: Not on file     Years of education: Not on file     Highest education level: Not on file   Occupational History     Not on file   Tobacco Use     Smoking status: Former Smoker     Types: Cigarettes     Quit date: 11/15/1987     Years since quittin.3     Smokeless tobacco: Former User   Substance and Sexual Activity     Alcohol use: Not Currently     Alcohol/week: 0.0 standard drinks     Comment: MALISSA white since      Drug use: No     Comment: used marijuanna in the past     Sexual activity: Never     Partners: Male   Other Topics Concern     Parent/sibling w/ CABG, MI or angioplasty before 65F 55M? No   Social History Narrative     Not on file     Social Determinants of Health     Financial Resource Strain: Not on file   Food Insecurity: Not on file   Transportation Needs: Not on file   Physical Activity: Not on file   Stress: Not on file   Social  "Connections: Not on file   Intimate Partner Violence: Not on file   Housing Stability: Not on file      ROS: 10 point ROS neg other than the symptoms noted above in the HPI.    Physical Exam  BP (!) 152/89 (BP Location: Right arm)   Pulse 89   Temp 98.7  F (37.1  C) (Oral)   Resp 18   Ht 1.651 m (5' 5\")   Wt 84.4 kg (186 lb)   LMP  (LMP Unknown)   SpO2 97%   BMI 30.95 kg/m    Gen: Lying in bed. No acute distress.  Alert.  Slightly confused and difficult to follow but at baseline from previous interactions.  Spine: Tender to palpation in the midline and paraspinal region of the cervical and lumbar spine.  Range of motion not assessed for known severe spinal stenosis but moving head and neck without difficulty.  Bilateral upper extremities: Sensation intact in C4-T1 distributions.  Moving arms and hands throughout the evaluation including opening and dialing her phone.  5-5 strength in wrist flexion, wrist extension, and hand intrinsics.  Positive Aries's on the left and negative Aries's on the right.  Bilateral lower extremities: Sensation intact in L3-S1 distributions.  5 out of 5 strength in tibialis anterior and EHL function.  Able to bend her legs at the knee bilaterally.  Weakness with plantar flexion bilaterally 4- out of 5.  Perineal sensation and rectal tone not assessed given she has a urinary management device in place.    Imaging:  MRI Cervical spine  IMPRESSION:  1.  Prominent left lateral recess disc osteophyte complex at C7-T1 with an associated superimposed midline disc protrusion/extrusion with caudal migration. At this level there is severe central spinal canal stenosis with severe left lateral recess   stenosis and severe left neural foraminal stenosis. Patchy associated increased signal within the central spinal cord at this level may reflect edema versus myelomalacia.  2.  Edema and enhancement involving the endplates of C7 and T1 is favored to be degenerative in etiology, although " infection could have a similar appearance.  3.  Mild ventral and right lateral epidural enhancement noted centered surrounding the C7-T1 intervertebral disc is favored to be reactive, although infection could have a similar appearance. Correlation for any signs/symptoms of infection is   recommended.  4.  Nonspecific edema and enhancement noted involving the right C7-T1 facet joint, possibly degenerative versus infectious/inflammatory in etiology.  5.  Additional multilevel degenerative spondylolisthesis, as above.      MRI Thoracic spine:  IMPRESSION:  1.  No abnormal spinal cord signal or intramedullary/leptomeningeal enhancement.  2.  Multilevel degenerative changes, as above.  3.  Small T2 hypointense, STIR hyperintense well-circumscribed enhancing lesion along the left anterolateral paravertebral soft tissues measuring 1.2 x 1.1 x 1.4 cm (AP x TV x CC). This finding is nonspecific but could reflect a neurofibroma/schwannoma,   nerve sheath tumor, metastatic lymph node, atypical mediastinal mass or other lesion. Consider dedicated CT chest for further assessment.    MRI Lumbar spine:  IMPRESSION:  1.  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  2.  At L4-L5 there is severe central spinal canal stenosis with severe bilateral subarticular zone stenosis, moderate left neural foraminal stenosis and mild right neural foraminal stenosis.  3.  At L5-S1 there is mild central spinal canal stenosis with severe bilateral neural foraminal stenosis and compression of the bilateral exiting cauda equina nerve roots, left greater than right.  4.  At L3-L4 there is severe central spinal canal stenosis with mild left and mild to moderate right neural foraminal stenosis.  5.  At L2-L3 there is moderate central spinal canal stenosis with mild right neural foraminal stenosis.   6.  Multilevel fluid in the facet joints, as described above, is favored to be degenerative in etiology, however, an  infectious/inflammatory facet synovitis could have a similar appearance.        Assessment:  Ms. Yang is a pleasant 81-year-old female with multiple medical comorbidities and active pneumonia.  She also has been on chronic steroids for the last 35 years and has longstanding type 2 diabetes with severe peripheral neuropathy.  She has cervical stenosis with cervical myelopathy in addition to severe lumbar spinal stenosis.  She has chronic neck pain, arm pain, low back pain, and lower extremity pain with increasing dysfunction in her lower extremities.    Recommendations:  I had an extensive conversation with the patient and her son regarding her current condition and her prognosis with this.  I discussed that with regards to her cervical spinal stenosis and cervical myelopathy that surgical intervention would involve a C7-T1 anterior cervical discectomy and fusion.  I discussed the clinical course and recovery of this and the risks associated with the surgery.  As we discussed previously in the fall they are adamantly opposed to any surgery at this time given her clinical condition of active pneumonia and multiple medical comorbidities and frailty.  I agree with this decision.  I also discussed that she has severe spinal stenosis of the lumbar spine and this may cause progressive weakness, pain, and dysfunction of the lower extremities, bladder, and bowel.  They were very clear and the natural progression of the these 2 conditions and understood the risks associated with surgical intervention and the risks associated with not proceeding with surgical intervention.  And after this conversation, they were much more interested in a palliative care approach or at least waiting until the infection has cleared to reevaluate.    -I agree with pain consultation to help with that component.  -I recommend palliative care consultation and discussion of long-term care needs.  -Continued close evaluation to assess her clinical  condition of upper and lower extremities  -We will reassess the need for surgery and the risks and benefits associated with it once she has cleared her pneumonia per the patient and her sons request.    Tani Velásquez MD  [phone]

## 2022-03-08 NOTE — PROGRESS NOTES
[facility] ACUTE PAIN SERVICE     Date of Admission:  3/7/2022  Physician requesting consult: Alec Faust NP   Reason for consult: acute on chronic pain  Primary Care Physician: Ananya Mclean NP     Assessment/Plan:     Tonya Yang is a 81 year old female who was admitted on 3/7/2022.  Pain team was asked to see the patient for acute on chronic pain. Admitted for back and leg pain, pneumonia of right lower lobe, sepsis, weakness of right lower extremity. Patient reporting cough x 1 weak and suspect aspiration pneumonia. History including DM2,  chronic low back pain radiating into RLE, hearing loss, anxiety, bipolar 1 disorder, chronic pain asthma, COPD, GERD, Meniere's disease,  HTN, hyperlipidemia, CKD, depression, IBS, neuropathy. Spine Surgery consulted.  Patient with recent injection but pain worsening again. Spine Surgery has evaluated, surgery not planned.    Describes pain as 1010 and is constant, and she reported she is with severe pain all of the time. She reports that even when taking Percocet at home , she is in significant pain.  Imaging noted.  Patient has pain in back, neck, arm and lower extremity.  Patient is interested in acupuncture, and will try lidocaine ointment as well.  Diet is NPO for swallow study. The patient does not smoke and denies chemical dependency history.     Discussed plan with Ortho, and  Dr. Mishra      Opioid Induced Respiratory Depression Risk Assessment:?  High: age, concomitant CNS depressants, COPD, asthma, CKD.    PLAN:   1) Pain is consistent with worsening of chronic pain. 2)Multimodal Medication Therapy  Topical: Lidocaine ointment 5% tid to neck, low back, Lidocaine patch 3 ordered afterwards to back.   NSAID'S: CrCl 46 ml/min, No, CKD3, patient also on prednisone 6 mg daily  Muscle Relaxants: diazep  Adjuvants: Tylenol 650 mg po tid, gabapentin 200 mg po bid  Antidepressants/anxiolytics: Cymbalta 60 mg daily, diazepam 0.5 mg bid prn for anxiety(home  med)  Opioids:  Oxycodone 5 mg po qid prn.  IV Pain medication: DC  3)Non-medication interventions: ice  Acupuncture consult, Integrative consult -patient would like   4)Constipation Prophylaxis: senna/docusate 1-2 po bid prn  5) Care Teams: [facility], Neurosurgery  -Opioid prescriber has been Ananya Antonio  -MN  pulled from system on 03/08/22. This indicates current opioid use.  02/08/22 Percocet 5/325 #120  01/21/22 gabapentin 100 mg #360(90 day supply)  01/13/22 Percocet 5/325 #100  01/06/22 diazepam 2 mg #10  Discharge Recommendations - We recommend prescribing the following at the time of discharge: most likely none, could return to using Percocet as she had been before admission.     History of Present Illness (HPI):       Tonya Yang is a 81 year old old female who presented for worsening back and leg pain, weakness, now mostly bed bound, patient found to have pneumonia.   Past medical history as above. The pain is reported to be worsening of chronic back pain, radiating to LE.   Per MN  review, the patient does  have an opioid tolerance.     Past pain treatments have included:steroid injections, last one recent, scheduled for repeat in the next few weeks.       Home pain medications/psych medications/anticoagulation medications include: Percocet, gabapentin, Cymbalta, Lamictal      Medical History   PAST MEDICAL HISTORY:   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     Hepatic abscess      Hyperlipidemia      Hypertension      Irritable bowel syndrome      Meniere disease      Neuropathy      Noninfectious ileitis      Peritoneal  abscess (H)      Spinal stenosis of lumbar region      Steroid long-term use      Vaginitis, atrophic, postmenopausal      Vitamin D deficiency        PAST SURGICAL HISTORY:   Past Surgical History:   Procedure Laterality Date     CATARACT IOL, RT/LT Left 7/2013     FOOT SURGERY      toe     GI SURGERY       LAPAROSCOPIC HEPATECTOMY PARTIAL  11/4/2013    Procedure: LAPAROSCOPIC HEPATECTOMY PARTIAL;  Laparoscopic Debridement of Liver Abcess;  Surgeon: Sepideh Green MD;  Location: [facility] OR     ORTHOPEDIC SURGERY       PICC INSERTION  8/28/2013    5fr DL Power PICC, 41cm (1cm external length) in the R lateral brachial vein with tip in the SVC RA junction.     RECTAL SURGERY      1970s     VASCULAR SURGERY       Yolanda Aggarwal [facility]  Acute Care Pain Management Program  [facility] ([place], [place], [place])  Monday-Friday 8a-4p   Page via online paging system or call [phone]

## 2022-03-08 NOTE — PROGRESS NOTES
Patient was A&Ox4 and is currently resting. Patient stated she was experiencing intermittent pain, and was provided with medication, ice and repositioning to intervene. Was able to complete a dysphagia pre-screening, and Speech was requested to consult/evaluate following water test. Was able to find clear masks to help patient lip-read in conjunction with her hearing aids, and she appeared to appreciate that.  Plan of care to continue today per providers and care teams.     Gaby Contreras RN

## 2022-03-08 NOTE — CONSULTS
DIABETES CARE  Consulted by Provider for Diabetes Education: A1C 10.6% 7/2021, pt not on insulin, diabetic diet ed.     81 year old female with type 2 diabetes. Patient was admitted for acute on chronic low back pain, found to be tachycardia with leukocytosis and have a RML pneumonia; concerning for severe sepsis.  .   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    Nutrition & Diabetes History:   Pt known to diabetes service from previous hospitalization in July 2021. At this time we had planned to do insulin with pt at home however after assessing/reassessing and talking with her son we decided it was not a good plan as she was not able to demonstrate ability to do this. Her son also noted they were trying to stay away from insulin d/t complexity and they were working with PCP on other options. She does take steroid daily which exacerbates BG.    Do not see that pt f/u with PCP regarding BG    Meds for BG Management PTA:  -Glipizide 20mg by mouth before meals, pt takes 10mg by mouth before meals  -Jardiance 25mg daily  -Metformin 250mg two times per day-not taking d/t diarrhea  -Prednisone 6mg daily    Current  "Inpatient Meds for BG Management:  -Novolog correction: medium scale with meals + HS  -Prednisone 6mg daily    Labs:  Hemoglobin A1C: 10.8% (7/20/21)       SCr: 1.07 GFR: 53   BGs:   Results for TROY JARA (MRN [id number]) as of 3/8/2022 10:47   Ref. Range 3/7/2022 12:07 3/7/2022 13:54 3/7/2022 15:30 3/7/2022 15:33 3/7/2022 15:33 3/7/2022 16:39 3/7/2022 18:17 3/7/2022 20:56 3/7/2022 21:09 3/8/2022 00:12 3/8/2022 00:13 3/8/2022 00:15 3/8/2022 06:26 3/8/2022 09:28   GLUCOSE BY METER POCT Latest Ref Range: 70 - 99 mg/dL 251 (H)        202 (H)     148 (H)       Diet Order:  NPO-for swallow study   Weight: 84.4kg BMI: Body mass index is 30.95 kg/m .      DM EDUCATION/COUNSELING:  Barriers to Learning and/or DM Self-Management: [place], hx of inability to show ability to manage insulin at home.   Previous DM Education:   Yes when hospitalized in July 2021.    Current Education and/or visit with Patient and/or caregiver(s):  No education at this time.     Did speak with provider regarding pt. Will order A1C and determine will determine if diabetes education is needed prior to discharge.     (See also \"Diabetic Ed Flowsheet\"  Or Education tab-diabetes for any education topic details.)    ASSESSMENT:  Pt with hx of type 2 diabetes, last A1C 10.8% (7/20/21). During previous admission pt received diabetes education and it was determined she would not be able to do insulin on her own at home so orals were restarted. Will need to recheck A1C and monitor current BG to determine next best steps. If pt goes home alone again and insulin needed would need someone to assist with this. Per previous discussion with her son he would not be able to do this daily for her. BG improving however pt is currently NPO, expect she will need some medications once diet resumed.     RECOMMENDATIONS:  1. Order A1C, has not been checked in >3 months. A1C <8.5-8% given age/comorbidities  2. Could trial Metformin XR as this has less GI side effects.   3. " "If insulin is needed at discharge pt will not be able to this, will need assistance with this vs higher level of care.    For inpatient diabetes management guidelines refer to \"Guidelines for Subcutaneous Insulin Dosing\" found in the DIAB Subcutaneous Insulin Management Adult order set.     F/U PLAN:  As needed      Thank you,   Gaby Salazar RD, LD, [facility]  Diabetes Educator  Pager: [phone]            "

## 2022-03-08 NOTE — CONSULTS
Care Management Initial Consult    General Information  Assessment completed with: Patient, Children, Met with patient and son Davie  Type of CM/SW Visit: Initial Assessment    Primary Care Provider verified and updated as needed: Yes   Readmission within the last 30 days: no previous admission in last 30 days         Advance Care Planning: Advance Care Planning Reviewed: no concerns identified          Communication Assessment  Patient's communication style: spoken language (English or Bilingual)             Cognitive  Cognitive/Neuro/Behavioral:                        Living Environment:   People in home: alone (Son Davie lives 3 miles away.)     Current living Arrangements: apartment      Able to return to prior arrangements: other (see comments) (To be determined.)       Family/Social Support:  Care provided by: child(pamela)  Provides care for: no one, unable/limited ability to care for self  Marital Status:   Children          Description of Support System: Supportive, Involved         Current Resources:   Patient receiving home care services: Yes  Skilled Home Care Services: Skilled Nursing (for medication set up.)  Community Resources: Home Care (Unsure of agency. It may be [facility] per patient.)  Equipment currently used at home: cane, straight  Supplies currently used at home:      Employment/Financial:  Employment Status: retired        Financial Concerns: No concerns identified   Referral to Financial Worker: No       Lifestyle & Psychosocial Needs:  Social Determinants of Health     Tobacco Use: Medium Risk     Smoking Tobacco Use: Former Smoker     Smokeless Tobacco Use: Former User   Alcohol Use: Not on file   Financial Resource Strain: Not on file   Food Insecurity: Not on file   Transportation Needs: Not on file   Physical Activity: Not on file   Stress: Not on file   Social Connections: Not on file   Intimate Partner Violence: Not on file   Depression: Not at risk     PHQ-2 Score: 0    Housing Stability: Not on file       Functional Status:  Prior to admission patient needed assistance:   Dependent ADLs:: Ambulation-cane  Dependent IADLs:: Cleaning, Cooking, Laundry, Shopping, Meal Preparation, Medication Management, Transportation (Son helps. Had PCA. See notes.)  Assesssment of Functional Status: Not at baseline with ADL Functioning, Not at  functional baseline    Mental Health Status:          Chemical Dependency Status:                Values/Beliefs:  Spiritual, Cultural Beliefs, Moravian Practices, Values that affect care:                 Additional Information:  Assessed in ED. Writer met with patient and her son Davie and reviewed role of care management services, discussed goals of care and assessed need for any possible services at discharge.    Patient is very hard of hearing. Her son is here and helping.  She lives alone in an apartment and she is currently open to home care for skilled nurse. The nurse sets up her medications.  She thinks the agency is [facility].  (Will need to confirm in am)    Patient also has a helper from [facility] to take her out shopping or runs errands.  She also states she is suppose to have someone help her for 3 hours a day but either they can't find help or she also does not know who her current [facility]  is.      Her son states it has been hard for her to get around due to her spinal stenosis. She is scheduled for an epidural injection next week.    Follow clinical progression.        EVANGELIST MERLOS RN

## 2022-03-08 NOTE — PROGRESS NOTES
Consult received for incidental finding of enhancing small lesion on Thoracic MRI - possible neurofibroma/schwannoma. Notes reviewed - seems like patient and family are interested in palliative care consult. Neurosurgery consult may not be appropriate if symptom management is more the focus for patient particularly in the setting of active infection with pneumonia. This finding is likely unrelated to her symptoms of weakness. We can see patient tomorrow if consult still seems appropriate. This could also be OP visit. But if patient would not want surgery or is too high risk, not sure there is value. If this lesion wants to be pursued I do recommend the CT scan which was recommended by radiology.     GRACE Fuller-CNP  [facility]  O: [phone]

## 2022-03-08 NOTE — CONSULTS
Integrative Therapy Consult    Healing PresenceYes  Essential Oils: Topical (EO/Topical Oil)     Respiratory Support Blend - HC, Lavender Massage Oil - HC       Healing Music:       Breathwork:       Guided Imagery:       Acupressure:       Oshibori:       Energy Therapy:       Healing Touch:       Reiki:       Qi Gong:     Massage: Foot, Hand      Targeted Massage:    Sleep Promotion:       Other Therapy:       Intervention Reason: Anxiety/Stress     Pre and Post Session Scores: Patient Desires Treatment: yes  Pre-Session Anxiety: 10  Post-session Anxiety: 8                       Delivery:         Referrals:      Vesta Bustamante

## 2022-03-08 NOTE — PROGRESS NOTES
Hospitalist Progress Note    Assessment/Plan    Principal Problem:    Pneumonia of right lower lobe due to infectious organism  Active Problems:    Bilateral back pain    Severe sepsis (H)    Type 2 diabetes mellitus with hyperglycemia (H)    Glycosuria    81-year-old patient with significant past medical history including hearing difficulty, diabetes, chronic back pain, with progressive spinal stenosis, anxiety, GERD, overactive bladder, presented to the hospital for evaluation of acute on chronic lower back pain, she was found to have severe sepsis with right middle lobe pneumonia    Severe sepsis  Secondary to community-acquired pneumonia,  In the ER she was tachycardic, had leukocytosis on the blood work, chest x-ray showed medial right lower lobe consolidation,  Blood culture obtained,  Swallow study ordered,  Sputum culture ordered,  Was initially on vancomycin and Zosyn, currently on Rocephin and azithromycin,  Lactic acid checked and came back 2      Acute on chronic back pain with polyarthralgia,  Per the patient it became worse over last month previously she had gait assistance with cane but now bedbound due to weakness, follows with [facility], recent MRI showed that spinal stenosis per the patient but it is not available to see, pain team has been consulted, MRI of cervical lumbar and thoracic spine  concerning for severe bilateral neural foraminal stenosis and compression of bilateral exiting cauda equina nerve roots left greater than right  -Spine surgery consulted had extensive discussion with the patient and her son regarding her current condition and prognosis, also discussed with him about risks and benefits of doing surgery and not having surgery as well, they were more interested in palliative care approach,  Spine surgery recommended to continue treatment of her pneumonia and reassess the need for surgery and the risk and benefits associated with it once her pneumonia clears        incidental soft tissue finding  On MRI thoracic spine there is small well circumscribed enhancing lesion along the left anterolateral paravertebral soft tissue measure 1.2 x 1.1 x 1.4 cm  Nonspecific finding could represent neurofibroma/schwannoma, nerve sheath tumor, metastatic lymph node or atypical metastatic mass, radiologist recommended CT chest for further assessment   Discussed with spine surgery recommended neurosurgery evaluation, appreciate recommendation    Type 2 diabetes  Last year she had A1c checked it was 10   we will recheck A1c  Diabetic educator had met with her in the past and patient was not interested in insulin,  Currently on insulin while inpatient, holding Metformin Jardiance and glipizide      Hypertension  On triamterene hydrochlorothiazide for Ménière's disease and hypertension  Blood pressure is good control,      Ménière disease  On triamterene hydrochlorothiazide, prednisone, resumed    Bipolar disorder  Anxiety  Continue duloxetine, Lamictal, as needed Ativan      Overactive bladder  Resume oxybutynin    GERD   patient states she had  Occasional food stuck in her esophagus, on pantoprazole, will have a swallow study today    Goals of care discussion  I called his son and I discussed with him and he is interested in palliative care consult      Barriers to Discharge: IV antibiotic, pain control, monitoring for symptom improvement,    Anticipated discharge date/Disposition: TBD    Subjective  Patient was seen today, she had significant amount of pain in her back, she was also talking to me about food stuck in her esophagus and occasional coughing, she appears tearful and anxious and wondering what are the options for her back pain rather than surgery, I also called her son and had long discussion with her about her treatment plan and options      Objective    Vital signs in last 24 hours  Temp:  [98.7  F (37.1  C)-99.6  F (37.6  C)] 98.7  F (37.1  C)  Pulse:  [] 89  Resp:  [18]  18  BP: (125-174)/(58-89) 152/89  SpO2:  [91 %-97 %] 97 % [unfilled] O2 Device: None (Room air)    Weight:   [unfilled] Weight change:     Intake/Output last 3 shifts  I/O last 3 completed shifts:  In: 100 [IV Piggyback:100]  Out: 570 [Urine:570]  Body mass index is 30.95 kg/m .    Physical Exam:  General Appearance: Alert, oriented x3, does not appear in distress.  HEENT: Normocephalic. No scleral icterus, . Mucous membranes moist.  Heart: :Regular rate and rhythm, normal S1 ,S2, No murmurs, no JVD, nopedal edema   Lungs: Clear to auscultation bilaterally. No wheezing or crackles  Abdomen: Soft, non tender, no rebound or rigidity, non distended, bowel sounds present.  Extremity: Limited range of motion of both lower extremity secondary to back pain      Pertinent Labs   Lab Results: personally reviewed.   Recent Labs   Lab 03/07/22  1354      CO2 25   BUN 23     Recent Labs   Lab 03/08/22  0626 03/07/22  1354   WBC 19.5* 26.2*   HGB 8.5* 10.4*   HCT 31.1* 37.8    326     No results for input(s): CKTOTAL, TROPONINI in the last 168 hours.    Invalid input(s): TROPONINT, CKMBINDEX  Invalid input(s): POCGLUFGR    Medications  Drug and lactation database from the [place] National Library of Medicine:  http://toxnet.nlm.nih.gov/cgi-bin/sis/htmlgen?LACT      Pertinent Radiology   Radiology Results: {Reviewed MRI results      Advanced Care Planning:  Discharge Planning discussed with patient and her son over the phone  Total time with this patient is 47 min with 50% of time spent in examining the patient, reviewing records, discussing plan of care and counseling, 50% of time spent in coordination of care.  Care discussed and coordinated with orthopedic surgery, RN, care manager.      Hayder Mishra MD  Internal Medicine Hospitalist  3/8/2022

## 2022-03-08 NOTE — H&P
[facility]    History and Physical - Hospitalist Service       Date of Admission:  3/7/2022    Assessment & Plan      Tonya Yang is a 81 year old female admitted on 3/7/2022. She presented with acute on chronic low back pain, found to be tachycardia with leukocytosis and have a RML pneumonia; concerning for severe sepsis.     Severe Sepsis  Community acquired pneumonia  Tachycardic with leukocytosis on ER presentation, chest x-ray showing medial RLL consolidation. Pt with cough x1 wk and some symptoms of regurgitation, suspect aspiration pneumonia. Denies fever, endorses generalized spinal tenderness with LE weakness and UE/LE neuropathy, no localized neurological symptoms. Recent cervical epidural 1 wk ago with mild relief but worsening again. Received vancomycin and zosyn in ED.     Follow blood cultures    MRI C/T/L spine with contrast    NPO until bedside swallow study     MIVF x1.5 with NS     AM CBC    Evening lactate to trend    Transition abx to ceftriaxone and azithromycin    Sputum culture if possible    Incentive spirometry     Supplemental O2 as needed    Acute on chronic back pain  Polyarthralgias  Progressively worsening per patient over the last few months, previously gait assistance with cane but now is bedbound due to weakness over past few months. Follows with [facility], recent MRI that showed spinal stenosis per patient report but report not available in Care Everywhere.     Pain control with Dilaudid every 4 hours as needed    Pain medicine consult    Consider palliative consult    Spinal MRI as above    PT/OT eval    T2DM   Glucosuria   A1c 10 7/2021, on PTA Jardiance and Glipizide. Pt stopped Metformin due to GI upset.     Hold PTA Metformin, Jardiance, Glipizide    Medium sliding scale insulin     POCT glucose checks QID     Diabetic diet     Continue PTA Atorvastatin     Hypertension  On triamterene-hydrochlorothiazide for Meniere's disease, hypertensive  "likely due to pain.     Pain control as above    Continue PTA triamterene-HCTZ    Chronic Conditions  Meniere's disease    Continue PTA prednisone and triamterene-HCTZ  Anxiety  Bipolar    Continue PTA duloxetine, Lamictal     Continue diazepam prn   GERD    Continue PTA Pantoprazole  Overactive Bladder    Continue PTA  Oxybutynin        Diet: Moderate Consistent Carb (60 g CHO per Meal) Diet  DVT Prophylaxis: Pneumatic Compression Devices  Teran Catheter: Not present  Central Lines: None  Cardiac Monitoring: None  Code Status: Full Code     Clinically Significant Risk Factors Present on Admission                 # Obesity: Estimated body mass index is 30.79 kg/m  as calculated from the following:    Height as of this encounter: 1.651 m (5' 5\").    Weight as of this encounter: 83.9 kg (185 lb).      Disposition Plan   Expected Discharge:  TBD   Anticipated discharge location: home    Delays:          The patient's care was discussed with the Attending Physician, Dr. Alisha Christine.    Alec Faust DO  Hospitalist Service  [facility]  Securely message with the Vocera Web Console (learn more here)  Text page via crossvertise Paging/Directory   ______________________________________________________________________    Chief Complaint   Back pain    History is obtained from the patient and patient's son    History of Present Illness   Tonya Yang is a 81 year old female who has a history of T2DM, severe spinal stenosis, hypertension, polyarthralgias, cochlear hydrops who presents with acute on chronic lower back pain.    Back pain worsening since September 2021, walker assisted gait but now is primarily bedridden. No recent traumas, no falls, no recent sickness. Had a MRI with [facility] that showed severe spinal stenosis and had cervical epidural a week ago, has plans for more spinal injections upcoming but those were postponed due to her current symptoms.     Denies fever, chills, diaphoresis, " dysuria, abdominal pain, chest pain, blurry vision, myalgias, dysuria, constipation, diarrhea.     In ER, BMP wnl but had elevated CRP and lactate. WBC of 26.2, UA with glucosuria but no UTI. BC x2 pending, XR chest with medial right lower lobe pneumonia. Received 1L NS bolus, ativan 1mg x1, lidocaine patch for back pain, and pip/tazo and vancomycin in ER.     Review of Systems    The 10 point Review of Systems is negative other than noted in the HPI or here. Positive for back pain, LE pain, neuropathy of hands and feet, deafness, cough,     Past Medical History    I have reviewed this patient's medical history and updated it with pertinent information if needed.   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  Past Surgical History:   Procedure Laterality Date     CATARACT IOL, RT/LT Left 7/2013     FOOT SURGERY      toe     GI SURGERY       LAPAROSCOPIC HEPATECTOMY PARTIAL  11/4/2013    Procedure: LAPAROSCOPIC HEPATECTOMY PARTIAL;  Laparoscopic Debridement of Liver Abcess;  Surgeon: Sepideh Green MD;  Location: [facility] OR     ORTHOPEDIC SURGERY       PICC INSERTION  8/28/2013    5fr DL Power PICC, 41cm  (1cm external length) in the R lateral brachial vein with tip in the SVC RA junction.     RECTAL SURGERY      1970s     VASCULAR SURGERY         Social History   I have reviewed this patient's social history and updated it with pertinent information if needed.  Social History     Tobacco Use     Smoking status: Former Smoker     Types: Cigarettes     Quit date: 11/15/1987     Years since quittin.3     Smokeless tobacco: Former User   Substance Use Topics     Alcohol use: Not Currently     Alcohol/week: 0.0 standard drinks     Comment: MALISSA -paco since      Drug use: No     Comment: used marijuanna in the past       Family History   I have reviewed this patient's family history and updated it with pertinent information if needed.  Family History   Problem Relation Age of Onset     Cerebrovascular Disease Mother      Heart Disease Mother      Heart Disease Father      Heart Disease Brother      Diabetes No family hx of      Coronary Artery Disease No family hx of      Hypertension No family hx of      Hyperlipidemia No family hx of      Breast Cancer No family hx of      Colon Cancer No family hx of      Prostate Cancer No family hx of      Other Cancer No family hx of      Depression No family hx of      Anxiety Disorder No family hx of      Mental Illness No family hx of      Substance Abuse No family hx of      Anesthesia Reaction No family hx of      Asthma No family hx of      Osteoporosis No family hx of      Genetic Disorder No family hx of      Thyroid Disease No family hx of      Obesity No family hx of      Unknown/Adopted No family hx of        Prior to Admission Medications   Prior to Admission Medications   Prescriptions Last Dose Informant Patient Reported? Taking?   ACE/ARB NOT PRESCRIBED, INTENTIONAL,   Yes No   Si each continuous prn ACE & ARB not prescribed due to CKD (Chronic Kidney Disease)   Barberry-Oreg Grape-Goldenseal (BERBERINE COMPLEX PO) 3/6/2022 at Unknown time  Yes Yes    Sig: Take 1 tablet by mouth daily   CONTOUR NEXT TEST test strip   Yes No   Sig: TESTING EVERY DAY DX  E11.9   DULoxetine (CYMBALTA) 60 MG EC capsule 3/6/2022 at Unknown time  No Yes   Sig: TAKE 1 CAPSULE (60 MG) BY MOUTH DAILY   HYDROmorphone (DILAUDID) 2 MG tablet   No No   Sig: Take 1 tablet (2 mg) by mouth every 4 hours as needed for severe pain   JARDIANCE 25 MG TABS tablet 3/6/2022 at Unknown time  Yes Yes   Sig: Take 25 mg by mouth daily    Lidocaine (LIDOCARE) 4 % Patch Past Month at Unknown time  No Yes   Sig: Place 3 patches onto the skin every 24 hours To prevent lidocaine toxicity, patient should be patch free for 12 hrs daily.   Patient taking differently: Place 3 patches onto the skin daily as needed To prevent lidocaine toxicity, patient should be patch free for 12 hrs daily.   atorvastatin (LIPITOR) 10 MG tablet 3/6/2022 at Unknown time  Yes Yes   Sig: Take 10 mg by mouth At Bedtime   blood glucose (ACCU-CHEK FASTCLIX) lancing device   Yes No   Sig: FOR TESTING ONCE DAILY. DX  E11.9 TYPE 2 DIABETES   blood glucose (CONTOUR TEST) test strip   Yes No   Sig: TESTING EVERY DAY DX  E11.9   cyanocobalamin 1000 MCG SUBL 3/6/2022 at Unknown time  Yes Yes   Sig: Place 1,000 mcg under the tongue daily   diazepam (VALIUM) 1 MG/ML solution Past Week at Unknown time  No Yes   Sig: Take 0.5 mLs (0.5 mg) by mouth 3 times daily   Patient taking differently: Take 0.5 mg by mouth 2 times daily as needed    fluconazole (DIFLUCAN) 150 MG tablet Past Month at Unknown time  Yes Yes   Sig: Take 150 mg by mouth See Admin Instructions One dose prn yeast infection    gabapentin (NEURONTIN) 100 MG capsule 3/6/2022 at Unknown time  No Yes   Sig: Take 2 capsules (200 mg) by mouth 2 times daily   glipiZIDE (GLUCOTROL) 10 MG tablet 3/6/2022 at Unknown time  No Yes   Sig: Take 2 tablets (20 mg) by mouth 2 times daily (before meals)   Patient taking differently: Take 10 mg by mouth 2 times daily (before meals)    lamoTRIgine  (LAMICTAL) 100 MG tablet 3/6/2022 at Unknown time  Yes Yes   Sig: Take 100 mg by mouth 2 times daily   metFORMIN (GLUCOPHAGE) 500 MG tablet 3/6/2022 at Unknown time  No Yes   Sig: Take 0.5 tablets (250 mg) by mouth 2 times daily (with meals)   nystatin (MYCOSTATIN) 343270 UNIT/GM external powder Past Month at Unknown time  Yes Yes   Sig: Apply topically 2 times daily as needed Breast rash   omeprazole (PRILOSEC) 20 MG DR capsule 3/6/2022 at Unknown time  Yes Yes   Sig: Take 20 mg by mouth daily    order for DME   No No   Sig: Equipment being ordered: wrist brace   oxyCODONE-acetaminophen (PERCOCET) 5-325 MG tablet Past Week at Unknown time  Yes Yes   Sig: Take 1 tablet by mouth every 4 hours as needed for severe pain   oxybutynin ER (DITROPAN-XL) 10 MG 24 hr tablet 3/6/2022 at Unknown time  Yes Yes   Sig: Take 10 mg by mouth At Bedtime    predniSONE (DELTASONE) 1 MG tablet 3/6/2022 at Unknown time  Yes Yes   Sig: Take 6 mg by mouth daily 1mg tab and 5mg tab   triamcinolone (KENALOG) 0.1 % external cream Past Month at Unknown time  Yes Yes   Sig: Apply topically 2 times daily as needed    triamterene-HCTZ (MAXZIDE-25) 37.5-25 MG tablet 3/6/2022 at Unknown time  Yes Yes   Sig: Take 1 tablet by mouth daily   vitamin D3 (CHOLECALCIFEROL) 50 mcg (2000 units) tablet 3/6/2022 at Unknown time  Yes Yes   Sig: Take 3 tablets by mouth daily      Facility-Administered Medications: None     Allergies   Allergies   Allergen Reactions     Propofol Nausea and Vomiting     Shellfish Allergy      Other reaction(s): Dizziness       Physical Exam   Vital Signs: Temp: 98.7  F (37.1  C) Temp src: Oral BP: (!) 162/67 Pulse: 93   Resp: 18 SpO2: 91 % O2 Device: None (Room air) Oxygen Delivery: 2 LPM  Weight: 185 lbs 0 oz    Constitutional: awake, alert, cooperative, moderate distress, and appears stated age. Hard of hearing  Eyes: Lids and lashes normal, pupils equal, round and reactive to light, extra ocular muscles intact, sclera clear,  conjunctiva normal  ENT: Normocephalic, without obvious abnormality, atraumatic, sinuses nontender on palpation, external ears without lesions, oral pharynx with moist mucous membranes, tonsils without erythema or exudates, gums normal and good dentition.  Hematologic / Lymphatic: no cervical lymphadenopathy and no supraclavicular lymphadenopathy  Respiratory: No increased work of breathing, good air exchange, diminished breath sounds bilateral lower lobes but pt with poor inspiratory effort, no crackles or wheezing  Cardiovascular: Normal apical impulse, regular rate and rhythm, normal S1 and S2, no S3 or S4, and no murmur noted  GI: No scars, normal bowel sounds, soft, non-distended, non-tender, no masses palpated, no hepatosplenomegally  Skin: no bruising or bleeding  Musculoskeletal: There is no redness, warmth, or swelling of the joints.  Full range of motion noted.  Motor strength is 3 out of 5 lower extremities bilaterally, 5/5 with UEs bilaterally.  Tone is normal.  Neurologic: Awake, alert, oriented to name, place and time.  Cranial nerves II-XII are grossly intact.  Sensory is intact.    Neuropsychiatric: General: fidgeting and moderately distressed  Level of consciousness: alert / normal    Data   Data reviewed today: I reviewed all medications, new labs and imaging results over the last 24 hours. I personally reviewed the chest x-ray image(s) showing medial right middle lobe opacity.    Recent Labs   Lab 03/07/22  1354 03/07/22  1207   WBC 26.2*  --    HGB 10.4*  --    MCV 81  --      --      --    POTASSIUM 3.8  --    CHLORIDE 100  --    CO2 25  --    BUN 23  --    CR 1.07  --    ANIONGAP 14  --    MACY 9.0  --    * 251*     Recent Results (from the past 24 hour(s))   Chest XR,  PA & LAT    Narrative    EXAM: XR CHEST 2 VW  LOCATION: [facility]  DATE/TIME: 3/7/2022 4:12 PM    INDICATION: cough, leukocytosis  COMPARISON: None.      Impression    IMPRESSION:      There is focal consolidation in the medial right lower lobe consistent with pneumonia. Left lung remains well-expanded without airspace opacity.    Normal heart and mediastinal contours.    No pleural effusion or pneumothorax.       Patient Seen and Examined.    Discussed With Resident    Reviewed Note     CAP   Severe Sepsis   Recent spinal epidural injection     Rocephin   Azithromycin   MR spine   Pain consult   Palliative consult   PT/OT     Alisha Christine MD  Internal Medicine/ Hospitalist  [facility]  Office # [phone]

## 2022-03-09 ENCOUNTER — APPOINTMENT (OUTPATIENT)
Dept: SPEECH THERAPY | Facility: CLINIC | Age: 82
DRG: 871 | End: 2022-03-09
Payer: MEDICARE

## 2022-03-09 ENCOUNTER — APPOINTMENT (OUTPATIENT)
Dept: RADIOLOGY | Facility: CLINIC | Age: 82
DRG: 871 | End: 2022-03-09
Attending: HOSPITALIST
Payer: MEDICARE

## 2022-03-09 ENCOUNTER — APPOINTMENT (OUTPATIENT)
Dept: CT IMAGING | Facility: CLINIC | Age: 82
DRG: 871 | End: 2022-03-09
Attending: NURSE PRACTITIONER
Payer: MEDICARE

## 2022-03-09 LAB
GLUCOSE BLDC GLUCOMTR-MCNC: 143 MG/DL (ref 70–99)
GLUCOSE BLDC GLUCOMTR-MCNC: 154 MG/DL (ref 70–99)
GLUCOSE BLDC GLUCOMTR-MCNC: 202 MG/DL (ref 70–99)
GLUCOSE BLDC GLUCOMTR-MCNC: 213 MG/DL (ref 70–99)

## 2022-03-09 PROCEDURE — 250N000011 HC RX IP 250 OP 636: Performed by: HOSPITALIST

## 2022-03-09 PROCEDURE — 99232 SBSQ HOSP IP/OBS MODERATE 35: CPT | Performed by: HOSPITALIST

## 2022-03-09 PROCEDURE — 258N000003 HC RX IP 258 OP 636: Performed by: HOSPITALIST

## 2022-03-09 PROCEDURE — 120N000001 HC R&B MED SURG/OB

## 2022-03-09 PROCEDURE — 250N000013 HC RX MED GY IP 250 OP 250 PS 637: Performed by: STUDENT IN AN ORGANIZED HEALTH CARE EDUCATION/TRAINING PROGRAM

## 2022-03-09 PROCEDURE — 92611 MOTION FLUOROSCOPY/SWALLOW: CPT | Mod: GN

## 2022-03-09 PROCEDURE — 250N000009 HC RX 250: Performed by: HOSPITALIST

## 2022-03-09 PROCEDURE — 258N000003 HC RX IP 258 OP 636: Performed by: STUDENT IN AN ORGANIZED HEALTH CARE EDUCATION/TRAINING PROGRAM

## 2022-03-09 PROCEDURE — 74230 X-RAY XM SWLNG FUNCJ C+: CPT

## 2022-03-09 PROCEDURE — 250N000012 HC RX MED GY IP 250 OP 636 PS 637: Performed by: STUDENT IN AN ORGANIZED HEALTH CARE EDUCATION/TRAINING PROGRAM

## 2022-03-09 PROCEDURE — 250N000013 HC RX MED GY IP 250 OP 250 PS 637: Performed by: INTERNAL MEDICINE

## 2022-03-09 PROCEDURE — 250N000011 HC RX IP 250 OP 636: Performed by: STUDENT IN AN ORGANIZED HEALTH CARE EDUCATION/TRAINING PROGRAM

## 2022-03-09 PROCEDURE — 250N000013 HC RX MED GY IP 250 OP 250 PS 637: Performed by: HOSPITALIST

## 2022-03-09 PROCEDURE — 71260 CT THORAX DX C+: CPT

## 2022-03-09 PROCEDURE — 99223 1ST HOSP IP/OBS HIGH 75: CPT | Performed by: NURSE PRACTITIONER

## 2022-03-09 RX ORDER — METOCLOPRAMIDE 10 MG/1
10 TABLET ORAL 3 TIMES DAILY PRN
Status: ON HOLD | COMMUNITY
End: 2022-04-14

## 2022-03-09 RX ORDER — IOPAMIDOL 755 MG/ML
100 INJECTION, SOLUTION INTRAVASCULAR ONCE
Status: COMPLETED | OUTPATIENT
Start: 2022-03-09 | End: 2022-03-09

## 2022-03-09 RX ADMIN — PANTOPRAZOLE SODIUM 40 MG: 20 TABLET, DELAYED RELEASE ORAL at 05:50

## 2022-03-09 RX ADMIN — ENOXAPARIN SODIUM 40 MG: 100 INJECTION SUBCUTANEOUS at 16:28

## 2022-03-09 RX ADMIN — OXYCODONE HYDROCHLORIDE 5 MG: 5 TABLET ORAL at 22:54

## 2022-03-09 RX ADMIN — AZITHROMYCIN MONOHYDRATE 250 MG: 500 INJECTION, POWDER, LYOPHILIZED, FOR SOLUTION INTRAVENOUS at 21:14

## 2022-03-09 RX ADMIN — PREDNISONE 6 MG: 1 TABLET ORAL at 08:59

## 2022-03-09 RX ADMIN — SODIUM CHLORIDE: 9 INJECTION, SOLUTION INTRAVENOUS at 09:14

## 2022-03-09 RX ADMIN — DIAZEPAM 1 MG: 2 TABLET ORAL at 14:32

## 2022-03-09 RX ADMIN — ACETAMINOPHEN 650 MG: 325 TABLET ORAL at 21:08

## 2022-03-09 RX ADMIN — GABAPENTIN 200 MG: 100 CAPSULE ORAL at 09:00

## 2022-03-09 RX ADMIN — LIDOCAINE: 50 OINTMENT TOPICAL at 21:03

## 2022-03-09 RX ADMIN — GABAPENTIN 200 MG: 100 CAPSULE ORAL at 21:08

## 2022-03-09 RX ADMIN — LAMOTRIGINE 100 MG: 100 TABLET ORAL at 09:00

## 2022-03-09 RX ADMIN — DULOXETINE HYDROCHLORIDE 60 MG: 60 CAPSULE, DELAYED RELEASE PELLETS ORAL at 09:00

## 2022-03-09 RX ADMIN — ACETAMINOPHEN 650 MG: 325 TABLET ORAL at 08:59

## 2022-03-09 RX ADMIN — OXYCODONE HYDROCHLORIDE 5 MG: 5 TABLET ORAL at 09:50

## 2022-03-09 RX ADMIN — Medication 1 MG: at 00:47

## 2022-03-09 RX ADMIN — OXYCODONE HYDROCHLORIDE 5 MG: 5 TABLET ORAL at 01:43

## 2022-03-09 RX ADMIN — ACETAMINOPHEN 650 MG: 325 TABLET ORAL at 14:32

## 2022-03-09 RX ADMIN — IOPAMIDOL 75 ML: 755 INJECTION, SOLUTION INTRAVENOUS at 14:57

## 2022-03-09 RX ADMIN — Medication 1 MG: at 23:00

## 2022-03-09 RX ADMIN — ATORVASTATIN CALCIUM 10 MG: 10 TABLET, FILM COATED ORAL at 21:08

## 2022-03-09 RX ADMIN — LIDOCAINE: 50 OINTMENT TOPICAL at 14:32

## 2022-03-09 RX ADMIN — OXYCODONE HYDROCHLORIDE 5 MG: 5 TABLET ORAL at 05:51

## 2022-03-09 RX ADMIN — OXYBUTYNIN CHLORIDE 10 MG: 5 TABLET, FILM COATED, EXTENDED RELEASE ORAL at 21:09

## 2022-03-09 RX ADMIN — OXYCODONE HYDROCHLORIDE 5 MG: 5 TABLET ORAL at 19:07

## 2022-03-09 RX ADMIN — CEFTRIAXONE SODIUM 2 G: 2 INJECTION, POWDER, FOR SOLUTION INTRAMUSCULAR; INTRAVENOUS at 19:41

## 2022-03-09 RX ADMIN — LAMOTRIGINE 100 MG: 100 TABLET ORAL at 21:09

## 2022-03-09 RX ADMIN — LIDOCAINE: 50 OINTMENT TOPICAL at 11:54

## 2022-03-09 ASSESSMENT — ACTIVITIES OF DAILY LIVING (ADL)
ADLS_ACUITY_SCORE: 13
ADLS_ACUITY_SCORE: 11
ADLS_ACUITY_SCORE: 13
ADLS_ACUITY_SCORE: 11
ADLS_ACUITY_SCORE: 13
ADLS_ACUITY_SCORE: 11
ADLS_ACUITY_SCORE: 11
ADLS_ACUITY_SCORE: 13
ADLS_ACUITY_SCORE: 11
ADLS_ACUITY_SCORE: 13
ADLS_ACUITY_SCORE: 11
ADLS_ACUITY_SCORE: 11
ADLS_ACUITY_SCORE: 13
ADLS_ACUITY_SCORE: 11
ADLS_ACUITY_SCORE: 11
ADLS_ACUITY_SCORE: 13

## 2022-03-09 NOTE — PROGRESS NOTES
"SPIRITUAL HEALTH SERVICES Progress Note  [facility] [place]    Saw pt Tonya Yang per referral from yesterday, 3/8.        Illness Narrative - Tonya shared about her progressive loss of strength in her legs leading up to her current hospitalization, and notes that this has been both disorienting and overwhelming to process. She notes that her being [place] makes everything more challenging to navigate.       Distress - Tonya's primary distresses today were about her prospects for recovery and her current experience of relying on others for so much. \"I'm normally independent, I'm not used to having to ask people to get this, get that, etc.\"       Coping - Tonya describes herself as interested and engaged in spirituality. She is asserts that she is [employer] and believes in Rashle, and has many [place] friends. Prayer is a resource. Tonya engaged in reflective conversation and emotional processing in our conversation.       Meaning-Making - Tonya reflected on existential questions of suffering, God's presence, and her response. Tonya draws on many powerful spiritual/Uatsdin experiences in her own life as a resource for meaning and connection. Validated emotions and reflected on taking each day at a time.       Plan - Per request,  will follow up later in the week or early next week.     Wilian Brink MDiv    & CPE Educator    "

## 2022-03-09 NOTE — PLAN OF CARE
Problem: Plan of Care - These are the overarching goals to be used throughout the patient stay.    Goal: Plan of Care Review/Shift Note  Description: The Plan of Care Review/Shift note should be completed every shift.  The Outcome Evaluation is a brief statement about your assessment that the patient is improving, declining, or no change.  This information will be displayed automatically on your shift note.  Outcome: Ongoing, Progressing     Patient A & O. Challenge Games working well for patient. Assist of 2. Patient did pivot to commode. Per speech therapy, OK to resume regular diet. Planned swallow study tomorrow. Still need sputum collection. Education provided to patient. Bed alarms in use. Ongoing chronic back pain with control using Lidocaine patches, Tylenol PRN, and scheduled oxycodone. Running NS @ 175mL/h. Scheduled ABX given.

## 2022-03-09 NOTE — PROGRESS NOTES
Care Management Follow Up    Length of Stay (days): 2        Patient plan of care discussed at interdisciplinary rounds:     Expected Discharge Date:   3/11/2022       Concerns to be Addressed / Barriers to Discharge: medical management     Anticipated Discharge Disposition: home    Anticipated Discharge Services:  (see below)     Anticipated Discharge DME:          Additional Information:   3/9/2022 Received call from Ananya with [facility] care coordination seeking update on patient and her discharge plan. Reviewed notes in chart and per Hospitalist progress note 3/8/2022, no confirmed date for discharge yet due to monitoring for improved clinical symptoms.  Currently receiving IV antibiotics, pending cultures and noted plan for swallowing study today 3/9/2022.   Ananya's contact info: phone number [phone]. She has the same number for receiving faxed copies of discharge orders. She confirmed patient has [facility] for homemaking services but does not know the name of her current home care agency since the patient is new to her. Per GABRIELE notes patient may have [facility] RN for med set up.     Per GABRIELE  3/7 notes, patient thinks she has [facility] home care.  Reached out to [facility] home care liaison to confirm.  Per Suni, patient is not currently open to [facility] and has no note of whom she might be receiving home care RN services from.     Reviewed chart. Patient was admitted last July 2021 and at that time, she had an elderly waiver and her [facility]  was Marylee Vang (no phone contact number included). Her son, Davie, reported being her PCA for 6 hours a week with 10 hours of respite through [facility]. Per notes, [facility] was going to add RN to [facility] services.      Called and spoke with [facility] care to notify of hospitalization and verify services. Patient is currently open for homemaking only. She did  have contact info for patient's county  [place] [phone]. Attempted to call number but reached voicemail for a Fina with [facility] care connections. Requested return call re: searching for a Marylee Vang, case coordinator. Requested return call to [phone].     Received message from Renee CRAVEN NP with Palliative care. Per Renee, patient is requesting a referral to be sent to [facility] for assisted living. (sent) Updated NP that this writer is aware [facility] has TCU and LTC, unsure if they have assisted living.    Received return voicemail from a Pricila Cuh with [facility]. Rayna Ruggiero has left [facility] so she is covering patient in the community. Per Pricila, patient is being followed by their hospital CM [facility] team, main contact Ananya Owusu.      CM following.

## 2022-03-09 NOTE — PROGRESS NOTES
Hospitalist Progress Note    Assessment/Plan    Principal Problem:    Pneumonia of right lower lobe due to infectious organism  Active Problems:    Bilateral back pain    Severe sepsis (H)    Type 2 diabetes mellitus with hyperglycemia (H)    Glycosuria    81-year-old patient with significant past medical history including hearing difficulty, diabetes, chronic back pain, with progressive spinal stenosis, anxiety, GERD, overactive bladder, presented to the hospital for evaluation of acute on chronic lower back pain, she was found to have severe sepsis with right middle lobe pneumonia     Severe sepsis  Secondary to community-acquired pneumonia,  In the ER she was tachycardic, had leukocytosis on the blood work, chest x-ray showed medial right lower lobe consolidation,  Blood culture obtained,  Swallow study ordered, still pending results today  Sputum culture ordered,  Was initially on vancomycin and Zosyn, currently on Rocephin and azithromycin,  Lactic acid checked and came back 2  -CT chest showed evidence of pneumonia today        Acute on chronic back pain with polyarthralgia,  Per the patient it became worse over last month previously she had gait assistance with cane but now bedbound due to weakness  , follows with [facility],   recent MRI showed that spinal stenosis per the patient but it is not available to see, pain team has been consulted, MRI of cervical lumbar and thoracic spine  concerning for severe bilateral neural foraminal stenosis and compression of bilateral exiting cauda equina nerve roots left greater than right  -Spine surgery consulted had extensive discussion with the patient and her son regarding her current condition and prognosis, also discussed with him about risks and benefits of doing surgery and not having surgery as well, they were more interested in palliative care approach,  Spine surgery recommended to continue treatment of her pneumonia and reassess the need for surgery and  the risk and benefits associated with it once her pneumonia clears  -Pain management team consulted, palliative care consulted and signed off         incidental soft tissue finding  On MRI thoracic spine there is small well circumscribed enhancing lesion along the left anterolateral paravertebral soft tissue measure 1.2 x 1.1 x 1.4 cm  Nonspecific finding could represent neurofibroma/schwannoma, nerve sheath tumor, metastatic lymph node or atypical metastatic mass, radiologist recommended CT chest for further assessment   -Neurosurgery consulted, CT chest ordered today showed an mass in the left anterior paravertebral fat posterior to the descending thoracic aorta,  Most likely represent artifact recommend 6-month follow-up CT       Type 2 diabetes  Last year she had A1c checked it was 10   we will recheck A1c  Diabetic educator had met with her in the past and patient was not interested in insulin,  Currently on insulin while inpatient, holding Metformin Jardiance and glipizide        Hypertension  On triamterene hydrochlorothiazide for Ménière's disease and hypertension  Blood pressure is good control,        Ménière disease  On triamterene hydrochlorothiazide, prednisone, resumed     Bipolar disorder  Anxiety  Continue duloxetine, Lamictal, as needed Ativan        Overactive bladder  Resume oxybutynin     GERD   patient states she had  Occasional food stuck in her esophagus, on pantoprazole, will have a swallow study today     Goals of care discussion  Palliative care consulted, discussed with the patient, and her son, family not interested in hospice,         Barriers to Discharge: IV antibiotic for treatment of pneumonia, pain control,    Anticipated discharge date/Disposition: TCU in 1 to 2 days    Subjective  Patient was seen today she still complaining of pain in her back and she is asking if she will ever be able to walk straight back like she used to be in the past,    Objective    Vital signs in last 24  hours  Temp:  [98  F (36.7  C)-99  F (37.2  C)] 98.3  F (36.8  C)  Pulse:  [77-84] 82  Resp:  [16-20] 18  BP: ()/(60-72) 98/61  SpO2:  [91 %-96 %] 96 % [unfilled] O2 Device: None (Room air)    Weight:   [unfilled] Weight change:     Intake/Output last 3 shifts  I/O last 3 completed shifts:  In: 480 [P.O.:480]  Out: 1250 [Urine:1250]  Body mass index is 30.95 kg/m .    Physical Exam:  General Appearance: Alert, oriented x3, does not appear in distress.  HEENT: Normocephalic. No scleral icterus, . Mucous membranes moist.  Heart: :Regular rate and rhythm, normal S1 ,S2, No murmurs, no JVD, no pedal edema   Lungs:diminished BS bilaterally. No wheezing or crackles  Abdomen: Soft, non tender, no rebound or rigidity, non distended, bowel sounds present.      Pertinent Labs   Lab Results: personally reviewed.   Recent Labs   Lab 03/07/22  1354      CO2 25   BUN 23     Recent Labs   Lab 03/08/22  0626 03/07/22  1354   WBC 19.5* 26.2*   HGB 8.5* 10.4*   HCT 31.1* 37.8    326     No results for input(s): CKTOTAL, TROPONINI in the last 168 hours.    Invalid input(s): TROPONINT, CKMBINDEX  Invalid input(s): POCGLUFGR        Advanced Care Planning:  Discharge Planning discussed with patient  Total time with this patient is 25 min with 50% of time spent in examining the patient, reviewing records, discussing plan of care and counseling, 50% of time spent in coordination of care.  Care discussed and coordinated with CHERELLE .      Hayder Mishra MD  Internal Medicine Hospitalist  3/9/2022

## 2022-03-09 NOTE — PROGRESS NOTES
"Ortho Progress Note            Subjective:  Pain: Baseline pain cervical neck and low back  Chest pain, SOB:  no  Nausea, vomiting:  no  Lightheadedness, dizziness:  no  Neuro:  Patient denies new onset numbness or paresthesias    Objective:  BP (!) 147/69 (BP Location: Right arm)   Pulse 78   Temp 98.2  F (36.8  C) (Axillary)   Resp 16   Ht 1.651 m (5' 5\")   Wt 84.4 kg (186 lb)   LMP  (LMP Unknown)   SpO2 96%   BMI 30.95 kg/m    Gen: A&O x 3. NAD. Appears nontoxic, no acute distress  Calf tenderness: calves are soft and non-tender bilaterally     Pertinent Labs   Lab Results: personally reviewed.   Lab Results   Component Value Date    INR 0.9 09/06/2016    INR 2.6 07/26/2016    INR 2.1 06/14/2016    INR 2.1 06/14/2016     Lab Results   Component Value Date    WBC 19.5 (H) 03/08/2022    HGB 8.5 (L) 03/08/2022    HCT 31.1 (L) 03/08/2022    MCV 81 03/08/2022     03/08/2022     Lab Results   Component Value Date     03/07/2022    CO2 25 03/07/2022       Assessment:   Ms. Yang is a pleasant 81-year-old female with multiple medical comorbidities and active pneumonia.   Type 2 diabetes with severe peripheral neuropathy.  She has cervical stenosis with cervical myelopathy in addition to severe lumbar spinal stenosis.  She has chronic neck pain, arm pain, low back pain, and lower extremity pain with increasing dysfunction in her lower extremities.  Plan:   Continue PT/OT  Weightbearing status: WBAT  Anticoagulation:  SCDs, tamia stockings and early ambulation.  Discharge planning: pending    Report completed by:  Naga Arellano PA-C  Date: 3/9/2022  Time: 4:52 PM  "

## 2022-03-09 NOTE — PROGRESS NOTES
[facility] ACUTE PAIN SERVICE    Daily PAIN Progress Note    Assessment/Plan:  Tonya Yang is a 81 year old female who was admitted on 3/7/2022.  Pain team was asked to see the patient for acute on chronic pain. Admitted for back and leg pain, pneumonia of right lower lobe, sepsis, weakness of right lower extremity. Patient reporting cough x 1 weak and suspect aspiration pneumonia. History including DM2,  chronic low back pain radiating into RLE, hearing loss, anxiety, bipolar 1 disorder, chronic pain asthma, COPD, GERD, Meniere's disease,  HTN, hyperlipidemia, CKD, depression, IBS, neuropathy. Spine Surgery consulted.  Patient with recent injection but pain worsening again. Spine Surgery has evaluated, surgery not planned.    Describes neck and back  pain as 1010 and is constant, and she reported she is with severe pain all of the time. Patient is followed by [facility], she is schedule for 2 epidural injections, and she reports minimal relief with previous injection. It appears they talked to her about Pain Pump, but she is worried about infection.   Patient has pain in back, neck, all across shoulders, arm and lower extremity(left). We discussed diet and physical therapy with her to regain her strength.  Cognitive behavioral therapy would most likely benefit her as well.  The patient does not smoke and denies chemical dependency history.      No changes recommended        Opioid Induced Respiratory Depression Risk Assessment:?  High: age, concomitant CNS depressants, COPD, asthma, CKD.     PLAN:   1) Pain is consistent with worsening of chronic pain.   2)Multimodal Medication Therapy  Topical: Lidocaine ointment 5% tid to neck, low back, Lidocaine patch 3 ordered afterwards to back.   NSAID'S: CrCl 46 ml/min, No, CKD3, patient also on prednisone 6 mg daily  Muscle Relaxants: diazep  Adjuvants: Tylenol 650 mg po tid, gabapentin 200 mg po bid  Antidepressants/anxiolytics: Cymbalta 60 mg daily,  diazepam now at 1 mg q 6 h prn anxiety, or prior to imaging.   Opioids:  Oxycodone 5 mg po qid prn.  IV Pain medication: DC  3)Non-medication interventions: ice  Acupuncture consult, Integrative consult -patient would like   4)Constipation Prophylaxis: senna/docusate 1-2 po bid prn  5) Care Teams: [facility], Neurosurgery  -Opioid prescriber has been Ananya MO  pulled from system on 03/08/22. This indicates current opioid use.  02/08/22 Percocet 5/325 #120  01/21/22 gabapentin 100 mg #360(90 day supply)  01/13/22 Percocet 5/325 #100  01/06/22 diazepam 2 mg #10  Discharge Recommendations - We recommend prescribing the following at the time of discharge: most likely none, could return to using Percocet as she had been before admission.    Principal Problem:    Pneumonia of right lower lobe due to infectious organism  Active Problems:    Bilateral back pain    Severe sepsis (H)    Type 2 diabetes mellitus with hyperglycemia (H)    Glycosuria    Yolanda Aggarwal [facility]  Acute Care Pain Management Program  [facility] ([place], [place], [place])  Monday-Friday 8a-4p   Page via online paging system or call [phone]

## 2022-03-09 NOTE — CONSULTS
PALLIATIVE CARE CONSULT NOTE     Patient Name: Tonya Yang  Date of Admission: 3/7/2022   Requesting Clinician / Team: Tad  Reason for consult: [facility]          Impressions and Recommendations     1)   GOALS OF CARE are .   ? Restorative, not a hospice candidate     2)    ADVANCED CARE PLANNING  ? Patient has completed health care directive: N  ? [place] health care agent: Jonas Broderick  ? Code Status:Full code.  Discussion Deferred to family MD due to anxiety. Son believes they completed some form with family DrAvi      3)    SYMPTOM MANAGEMENT     Palliative Care not managing symptoms    Pain team will managed Pain      4)    PSYCHOSOCIAL/SPIRITUAL SUPPORT  ? Declined  spiritual care support    ?    ? Palliative medicine will  Sign off       Admission Info        Principal Problem:    Pneumonia of right lower lobe due to infectious organism  Active Problems:    Bilateral back pain    Severe sepsis (H)    Type 2 diabetes mellitus with hyperglycemia (H)    Glycosuria      History of Present Illness:    81-year-old patient with significant past medical history including hearing difficulty, diabetes, chronic back pain, with progressive spinal stenosis, anxiety, GERD, overactive bladder, presented to the hospital for evaluation of acute on chronic lower back pain, she was found to have severe sepsis with right middle lobe pneumonia    Recent Inpatient Admissions (last 12 mo)  7/19/21  Muscle weakness             Interval History:   SLP:  Bedside swallow study completed per MD order. No oral dysphagia noted. Pharyngeal dysphagia is not suspected given absence of clinical signs and symptoms of aspiration, however, consolidation in RLL is concerning for possible aspiration pneumonia. Cannot rule out possibility of silent aspiration. Recommend video swallow study to further evaluate swallow physiology & function and rule out silent aspiration. In the meantime, there is insufficient keep patient NPO. She appears appropriate to  "resume previous diet of Regular textures and thin liquids. Her longstanding history of GERD and poor compliance with dietary recommendations (per son's report), as well as frequent belching with PO intake and sensation of liquid \"coming back up\" during this evaluation, is suggestive of esophageal dysmotility issue(s). Ideally, would complete esophagram immediately following video swallow study, however, this would be very challenging given patient's inability to stand at present. Anticipate that she may benefit from GI consult.    N/S  Consult received for incidental finding of enhancing small lesion on Thoracic MRI - possible neurofibroma/schwannoma. Notes reviewed - seems like patient and family are interested in palliative care consult. Neurosurgery consult may not be appropriate if symptom management is more the focus for patient particularly in the setting of active infection with pneumonia. This finding is likely unrelated to her symptoms of weakness. We can see patient tomorrow if consult still seems appropriate. This could also be OP visit. But if patient would not want surgery or is too high risk, not sure there is value. If this lesion wants to be pursued I do recommend the CT scan which was recommended by radiology.      Palliative Assessment/discussion     I met with patient and her son.   I discussed the reason for Palliative Care Referral and our role in symptom management, patient family communication, understanding their choices for medical treatment, and providing  guidance in making difficult decisions in the framework of focusing on patient comfort and quality of life.    Patient is very [place], she has 15% hearing in one ear due to [place].  This causes her a lot of anxiety.  Patient's  Main issue was pain.  She is pending a GI work up for esophageal issues.    I explained Palliative care. She has chronic pain secondary to severe spinal stenosis.  She follows with [facility] and has tried " epidural, SCC, and accupuncture and sterioids and  says she is not a surgical candidate due to steroids and risk of infection.    She wants to go live in a ASL and is hoping [facility] has an A[facility]            Prognosis, goals and planning        Patient's decision making preferences: with help of son    I have concerns about the patient/family's health literacy today: N    Prognosis, Goals, and/or Advance Care Planning were addressed today: Y    Mood, coping, and/or meaning in the context of serious illness were addressed today: Y    Saeed Palliative Symptom data:   Pain: Max  Dyspnea: N  Nausea: N  Anxiety: N    Functional Status:  Current PPS% (100% normal, 0% death): 30  Baseline PPS% (2 weeks prior to admit): 30    Capacity evaluation:    Patient does   have decision making capacity based on the following:     Ability to understand relevant information about current  condition? Y    Ability to demonstrate understanding of  current  illness and care needs? Y    Ability to communicate  options for treatment? Y      Social:   Living situation: lives in public housing on [address]. She had elevator access  Baseline function: used to use cane, now has been using a walker x 2 months  Home support: PCA 10 hours a week  Marital status: not   Number of children: one son       Spiritual:  Are you a spiritual person: yes  Leah: Mor[employer]           Review of Systems:     A full 14 point review of systems was otherwise completed and is negative aside from that mentioned above    -----------------------------------------------------------------------------------------------------------------  I have reviewed and supplemented the documentation in this patient's medical record listed below regarding past medical history, social history, active medical problems, allergies and medications.     Current Problem List:   Principal Problem:    Pneumonia of right lower lobe due to infectious organism  Active Problems:    Bilateral back  pain    Severe sepsis (H)    Type 2 diabetes mellitus with hyperglycemia (H)    Glycosuria      Medical/Surgical History :  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     Hepatic abscess      Hyperlipidemia      Hypertension      Irritable bowel syndrome      Meniere disease      Neuropathy      Noninfectious ileitis      Peritoneal abscess (H)      Spinal stenosis of lumbar region      Steroid long-term use      Vaginitis, atrophic, postmenopausal      Vitamin D deficiency      Past Surgical History:   Procedure Laterality Date     CATARACT IOL, RT/LT Left 7/2013     FOOT SURGERY      toe     GI SURGERY       LAPAROSCOPIC HEPATECTOMY PARTIAL  11/4/2013    Procedure: LAPAROSCOPIC HEPATECTOMY PARTIAL;  Laparoscopic Debridement of Liver Abcess;  Surgeon: Sepideh Green MD;  Location: [facility] OR     ORTHOPEDIC SURGERY       PICC INSERTION  8/28/2013    5fr DL Power PICC, 41cm (1cm external length) in the R lateral brachial vein with tip in the SVC RA junction.     RECTAL SURGERY      1970s     VASCULAR SURGERY       Relevant Family History  Family History   Problem Relation Age of Onset     Cerebrovascular Disease Mother      Heart Disease Mother      Heart Disease Father      Heart Disease Brother      Diabetes No family hx of      Coronary Artery Disease No family hx of      Hypertension No family hx of      Hyperlipidemia No family hx of      Breast Cancer No family hx of      Colon Cancer No family hx of      Prostate Cancer No family hx of      Other Cancer No family hx of      Depression No family hx of      Anxiety Disorder No family  hx of      Mental Illness No family hx of      Substance Abuse No family hx of      Anesthesia Reaction No family hx of      Asthma No family hx of      Osteoporosis No family hx of      Genetic Disorder No family hx of      Thyroid Disease No family hx of      Obesity No family hx of      Unknown/Adopted No family hx of      Family Status   Relation Name Status     Mo   at age 68        stroke heart attack     Fa          heart attack     Bro  Alive        heart atttack, heart surgery, kidney failure     Son  Alive     MGMo       MGFa       PGMo       PGFa       Neg  (Not Specified)     Social History:    she  reports that she quit smoking about 34 years ago. Her smoking use included cigarettes. She has quit using smokeless tobacco. She reports previous alcohol use. She reports that she does not use drugs.  Medication  :  Current Facility-Administered Medications   Medication     acetaminophen (TYLENOL) tablet 650 mg     atorvastatin (LIPITOR) tablet 10 mg     azithromycin (ZITHROMAX) 250 mg in sodium chloride 0.9 % 250 mL intermittent infusion     cefTRIAXone (ROCEPHIN) 2 g vial to attach to  ml bag for ADULTS or NS 50 ml bag for PEDS     glucose gel 15-30 g    Or     dextrose 50 % injection 25-50 mL    Or     glucagon injection 1 mg     diazepam (VALIUM) half-tab 1 mg     DULoxetine (CYMBALTA) DR capsule 60 mg     enoxaparin ANTICOAGULANT (LOVENOX) injection 40 mg     gabapentin (NEURONTIN) capsule 200 mg     insulin aspart (NovoLOG) injection (RAPID ACTING)     insulin aspart (NovoLOG) injection (RAPID ACTING)     lamoTRIgine (LaMICtal) tablet 100 mg     Lidocaine (LIDOCARE) 4 % Patch 3 patch    And     lidocaine patch in PLACE     lidocaine (LMX4) cream     lidocaine (XYLOCAINE) 5 % ointment     lidocaine 1 % 0.1-1 mL     melatonin tablet 1 mg     naloxone (NARCAN) injection 0.2 mg    Or     naloxone (NARCAN) injection 0.4 mg    Or     naloxone (NARCAN)  "injection 0.2 mg    Or     naloxone (NARCAN) injection 0.4 mg     ondansetron (ZOFRAN-ODT) ODT tab 4 mg    Or     ondansetron (ZOFRAN) injection 4 mg     oxybutynin ER (DITROPAN-XL) 24 hr tablet 10 mg     oxyCODONE (ROXICODONE) tablet 5 mg     pantoprazole (PROTONIX) EC tablet 40 mg     predniSONE (DELTASONE) tablet 6 mg     prochlorperazine (COMPAZINE) injection 5 mg    Or     prochlorperazine (COMPAZINE) tablet 5 mg    Or     prochlorperazine (COMPAZINE) suppository 12.5 mg     senna-docusate (SENOKOT-S/PERICOLACE) 8.6-50 MG per tablet 1 tablet    Or     senna-docusate (SENOKOT-S/PERICOLACE) 8.6-50 MG per tablet 2 tablet     sodium chloride (PF) 0.9% PF flush 3 mL     sodium chloride (PF) 0.9% PF flush 3 mL     sodium chloride 0.9% infusion     [Held by provider] triamterene-HCTZ (MAXZIDE-25) 37.5-25 MG per tablet 1 tablet            Allergies:  Allergies   Allergen Reactions     Propofol Nausea and Vomiting     Shellfish Allergy      Other reaction(s): Dizziness     Emergency Contact Info (Pulled from chart)  Extended Emergency Contact Information  Primary Emergency Contact: Davie Yang   [facility]  Home Phone: [phone]  Mobile Phone: [phone]  Relation: Son  Secondary Emergency Contact: Delilah Schofield  Address: Daughter-In-Law   [facility]  Home Phone: [phone]  Mobile Phone: [phone]  Relation: Relative     Evaluation             PERTINENT PHYSICAL EXAMINATION:  Vital Signs: Blood pressure 98/61, pulse 82, temperature 98.3  F (36.8  C), temperature source Oral, resp. rate 18, height 1.651 m (5' 5\"), weight 84.4 kg (186 lb), SpO2 96 %, not currently breastfeeding.   GENERAL: sitting in chair, anxious  SKIN: Warm and dry   HEENT: Normocephalic, anicteric sclera, moist mucous membranes  LUNGS: Clear to auscultation anterolaterally; non-labored   CARDIAC: RRR, normal s1/s2, w/o m/r/g   ABDOMINAL: BS (+), soft, non distended, non tender  : nolasco in place  MUSKL: no gross joint deformities "   EXTREMITIES: No edema or cyanosis, pulses 2+ and symmetrical  NEUROLOGIC: able to wiggle her toes  PSYCH: anxious         Data Reviewed:     All labs/imaging reviewed in Epic     ====================================================  TT: I have personally spent a total of 85 minutes on the unit in review of medical record, consultation with the medical providers and assessment of patient today, with more than 50% of this time spent in counseling, coordination of care, and discussion with son, SW, patient re: diagnostic results, prognosis, symptom management, risks and benefits of management options, and development of plan of care as noted above.  ====================================================    JUAN ANTONIO Lozoya, NP-C, [facility]   [facility]  Palliative Medicine  Office: [phone]

## 2022-03-09 NOTE — PROGRESS NOTES
"Speech Therapy - Bedside Swallow Study     03/08/22 1630   General Information   Onset of Illness/Injury or Date of Surgery 03/07/22   Referring Physician Dr. Mishra   Pertinent History of Current Problem Per Dr. Mishra's note from earlier this PM, \"81-year-old patient with significant past medical history including hearing difficulty, diabetes, chronic back pain, with progressive spinal stenosis, anxiety, GERD, overactive bladder, presented to the hospital for evaluation of acute on chronic lower back pain, she was found to have severe sepsis with right middle lobe pneumonia.\" CXR revealed focal consolidation in the medial right lower lobe consistent with pneumonia. Per nursing note from earlier this PM, \"pt failed water study/pre-screen, coughed to clear at the end.\" Concern for aspiration pneumonia given location of lung consolidation (RLL) as well as reported clinical signs and symptoms of aspiration with water.   General Observations Patient alert and sitting partially upright in bed. She is extremely [place]. Son, Davie, was present. He reports that patient's hearing is limited to only 15% in one ear. She is able to hear him quite well, so he served as an  of sorts during this session. Davie reports that patient also benefits from visual cues (lip reading) and written notes as needed.   Past History of Dysphagia No reported history of oropharyngeal dysphagia. Per her own report, patient has a signficant history of esophageal dysphagia. Her son confirms this.   Type of Evaluation   Type of Evaluation Swallow Evaluation   Oral Motor   Oral Musculature generally intact   Dentition (Oral Motor)   Dentition (Oral Motor) significant number of missing teeth   Comment, Dentition (Oral Motor) Patient is missing all of her lower molars and premolars bilaterally.   Facial Symmetry (Oral Motor)   Facial Symmetry (Oral Motor) WNL   Lip Function (Oral Motor)   Lip Range of Motion (Oral Motor) WNL   Tongue Function " "(Oral Motor)   Tongue ROM (Oral Motor) WNL   Jaw Function (Oral Motor)   Jaw Function (Oral Motor) WNL   Vocal Quality/Secretion Management (Oral Motor)   Vocal Quality (Oral Motor) WNL   General Swallowing Observations   Current Diet/Method of Nutritional Intake (General Swallowing Observations, NIS) NPO  (Pt was changed to NPO due to aspiration concerns)   Respiratory Support (General Swallowing Observations) none   Swallowing Evaluation Clinical swallow evaluation   Clinical Swallow Evaluation   Feeding Assistance set up only required   Clinical Swallow Evaluation Textures Trialed thin liquids;pureed;solid foods   Clinical Swallow Eval: Thin Liquid Texture Trial   Mode of Presentation, Thin Liquids cup;straw;self-fed   Volume of Liquid or Food Presented >4 ounces   Oral Phase of Swallow WFL   Pharyngeal Phase of Swallow regurgitation of food/liquids;other (see comments)  (Inconsistent belching & reports of liquid \"coming back up\")   Diagnostic Statement Swallow response appeared timely. No overt clinical signs or symptoms of aspiration. Patient presented with inconsistent belching after swallowing and intermittently reported that water was \"coming back up\".   Clinical Swallow Evaluation: Puree Solid Texture Trial   Mode of Presentation, Puree self-fed   Volume of Puree Presented 4 ounces   Oral Phase, Puree WFL   Pharyngeal Phase, Puree other (see comments)  (Intermittent belching)   Diagnostic Statement Swallow response appeared timely. No overt clinical signs or symptoms of aspiration. Patient presented with inconsistent belching after swallowing.   Clinical Swallow Evaluation: Solid Food Texture Trial   Mode of Presentation self-fed   Volume Presented Multiple bites  (3 lamar crackers)   Oral Phase WFL   Pharyngeal Phase other (see comments)  (Intermittent belching)   Diagnostic Statement Patient demonstrated effective and timely mastication despite limited lower dentition. No oral stasis noted after " "swallows. No overt clinical signs or symptoms of aspiration. Patient presented with inconsistent belching after swallowing.   Esophageal Phase of Swallow   Patient reports or presents with symptoms of esophageal dysphagia Yes   Esophageal comments Per Dr. Mishra's note from earlier this PM, \"she was also talking to me about food stuck in her esophagus and occasional coughing.\"   Swallowing Recommendations   Diet Consistency Recommendations thin liquids (level 0);regular diet   Supervision Level for Intake close supervision needed   Mode of Delivery Recommendations bolus size, small;slow rate of intake   Swallowing Maneuver Recommendations alternate food and liquid intake   Monitoring/Assistance Required (Eating/Swallowing) stop eating activities when fatigue is present;monitor for cough or change in vocal quality with intake   Recommended Feeding/Eating Techniques (Swallow Eval) maintain upright posture during/after eating for 30 minutes;minimize distractions during oral intake;set-up and prepare tray   Medication Administration Recommendations, Swallowing (SLP) Patient may take pills with liquids as tolerated. Limit to 1-2 at a time. If any difficulty is noted with this method, give pills 1-2 at a time, mixed in puree.   Instrumental Assessment Recommendations VFSS (videofluoroscopic swallowing study)   General Therapy Interventions   Planned Therapy Interventions Dysphagia Treatment   Dysphagia treatment Modified diet education;Instruction of safe swallow strategies;Compensatory strategies for swallowing  (Any or all of these pending results of VFSS)   Clinical Impression   Criteria for Skilled Therapeutic Interventions Met (SLP Eval) Yes, treatment indicated   SLP Diagnosis Suspected esophageal dysphagia   Risks & Benefits of therapy have been explained evaluation/treatment results reviewed;care plan/treatment goals reviewed;current/potential barriers reviewed;participants voiced agreement with care " "plan;participants included;patient;son  (Son, Davie, was present.)   Clinical Impression Comments Bedside swallow study completed per MD order. No oral dysphagia noted. Pharyngeal dysphagia is not suspected given absence of clinical signs and symptoms of aspiration, however, consolidation in RLL is concerning for possible aspiration pneumonia. Cannot rule out possibility of silent aspiration. Recommend video swallow study to further evaluate swallow physiology & function and rule out silent aspiration. In the meantime, there is insufficient keep patient NPO. She appears appropriate to resume previous diet of Regular textures and thin liquids. Her longstanding history of GERD and poor compliance with dietary recommendations (per son's report), as well as frequent belching with PO intake and sensation of liquid \"coming back up\" during this evaluation, is suggestive of esophageal dysmotility issue(s). Ideally, would complete esophagram immediately following video swallow study, however, this would be very challenging given patient's inability to stand at present. Anticipate that she may benefit from GI consult.   SLP Discharge Planning   SLP Discharge Recommendation Transitional Care Facility   SLP Rationale for DC Rec Possible change in swallow function compared to baseline.    Total Evaluation Time   Total Evaluation Time (Minutes) 30   SLP Goals   Therapy Frequency (SLP Eval) 5 times/wk   SLP Predicated Duration/Target Date for Goal Attainment 03/15/22   SLP Goals Swallow   SLP: Safely tolerate diet without signs/symptoms of aspiration Regular diet;Thin liquids;With use of swallow precautions;With use of compensatory swallow strategies;Independently     "

## 2022-03-09 NOTE — PROGRESS NOTES
Neurosurgery Note:    Neurosurgery consulted for soft tissue lesion in the left paravertebral soft tissue representing possible schwannoma, atypical mass or lymph node. Note plans for palliative care discussion and patient and son had previously declined surgical intervention. This lesion is not contributing to her weakness. Would recommend dedicated chest CT if continuing aggressive care but plan would likely be for monitoring of these lesion if no other evidence of malignancy is found. If surgery were recommended, would be extensive with lateral approach, fusion, chest tube etc.     Addendum: Palliative notes reviewed, no documented plans for comfort focused care. Will do CT chest. Please call with questions.       Addendum: CT chest reviewed, lesion favored to represent a lymph node. Radiology recommended 6 month follow up CT, neurosurgery will not follow this lesion - defer to primary vs pulm. We will sign off. Please call with questions.     Holly Keita CNP  [facility] Neurosurgery  O: [phone]  P: [phone]

## 2022-03-10 ENCOUNTER — APPOINTMENT (OUTPATIENT)
Dept: PHYSICAL THERAPY | Facility: CLINIC | Age: 82
DRG: 871 | End: 2022-03-10
Attending: PHYSICIAN ASSISTANT
Payer: MEDICARE

## 2022-03-10 ENCOUNTER — APPOINTMENT (OUTPATIENT)
Dept: SPEECH THERAPY | Facility: CLINIC | Age: 82
DRG: 871 | End: 2022-03-10
Payer: MEDICARE

## 2022-03-10 LAB
GLUCOSE BLDC GLUCOMTR-MCNC: 173 MG/DL (ref 70–99)
GLUCOSE BLDC GLUCOMTR-MCNC: 191 MG/DL (ref 70–99)
GLUCOSE BLDC GLUCOMTR-MCNC: 218 MG/DL (ref 70–99)
GLUCOSE BLDC GLUCOMTR-MCNC: 221 MG/DL (ref 70–99)

## 2022-03-10 PROCEDURE — 250N000013 HC RX MED GY IP 250 OP 250 PS 637: Performed by: NURSE PRACTITIONER

## 2022-03-10 PROCEDURE — 250N000012 HC RX MED GY IP 250 OP 636 PS 637: Performed by: STUDENT IN AN ORGANIZED HEALTH CARE EDUCATION/TRAINING PROGRAM

## 2022-03-10 PROCEDURE — 97530 THERAPEUTIC ACTIVITIES: CPT | Mod: GP

## 2022-03-10 PROCEDURE — 97162 PT EVAL MOD COMPLEX 30 MIN: CPT | Mod: GP

## 2022-03-10 PROCEDURE — 250N000011 HC RX IP 250 OP 636: Performed by: STUDENT IN AN ORGANIZED HEALTH CARE EDUCATION/TRAINING PROGRAM

## 2022-03-10 PROCEDURE — 99233 SBSQ HOSP IP/OBS HIGH 50: CPT | Performed by: HOSPITALIST

## 2022-03-10 PROCEDURE — 97110 THERAPEUTIC EXERCISES: CPT | Mod: GP

## 2022-03-10 PROCEDURE — 97116 GAIT TRAINING THERAPY: CPT | Mod: GP

## 2022-03-10 PROCEDURE — 250N000013 HC RX MED GY IP 250 OP 250 PS 637: Performed by: STUDENT IN AN ORGANIZED HEALTH CARE EDUCATION/TRAINING PROGRAM

## 2022-03-10 PROCEDURE — 258N000003 HC RX IP 258 OP 636: Performed by: HOSPITALIST

## 2022-03-10 PROCEDURE — 250N000013 HC RX MED GY IP 250 OP 250 PS 637: Performed by: INTERNAL MEDICINE

## 2022-03-10 PROCEDURE — 92526 ORAL FUNCTION THERAPY: CPT | Mod: GN

## 2022-03-10 PROCEDURE — 120N000001 HC R&B MED SURG/OB

## 2022-03-10 PROCEDURE — 99223 1ST HOSP IP/OBS HIGH 75: CPT | Performed by: NURSE PRACTITIONER

## 2022-03-10 PROCEDURE — 250N000013 HC RX MED GY IP 250 OP 250 PS 637: Performed by: HOSPITALIST

## 2022-03-10 PROCEDURE — 250N000011 HC RX IP 250 OP 636: Performed by: HOSPITALIST

## 2022-03-10 PROCEDURE — 250N000009 HC RX 250: Performed by: NURSE PRACTITIONER

## 2022-03-10 RX ORDER — METHOCARBAMOL 500 MG/1
500 TABLET, FILM COATED ORAL 4 TIMES DAILY
Status: DISCONTINUED | OUTPATIENT
Start: 2022-03-10 | End: 2022-03-13 | Stop reason: HOSPADM

## 2022-03-10 RX ORDER — HYDRALAZINE HYDROCHLORIDE 20 MG/ML
10 INJECTION INTRAMUSCULAR; INTRAVENOUS EVERY 6 HOURS PRN
Status: DISCONTINUED | OUTPATIENT
Start: 2022-03-10 | End: 2022-03-13 | Stop reason: HOSPADM

## 2022-03-10 RX ORDER — LIDOCAINE 50 MG/G
OINTMENT TOPICAL 4 TIMES DAILY
Status: DISCONTINUED | OUTPATIENT
Start: 2022-03-10 | End: 2022-03-13 | Stop reason: HOSPADM

## 2022-03-10 RX ORDER — BENZOCAINE/MENTHOL 6 MG-10 MG
LOZENGE MUCOUS MEMBRANE 3 TIMES DAILY PRN
Status: DISCONTINUED | OUTPATIENT
Start: 2022-03-10 | End: 2022-03-13 | Stop reason: HOSPADM

## 2022-03-10 RX ORDER — LIDOCAINE 4 G/G
3 PATCH TOPICAL
Status: DISCONTINUED | OUTPATIENT
Start: 2022-03-11 | End: 2022-03-10

## 2022-03-10 RX ORDER — GUAIFENESIN/DEXTROMETHORPHAN 100-10MG/5
10 SYRUP ORAL EVERY 4 HOURS PRN
Status: DISCONTINUED | OUTPATIENT
Start: 2022-03-10 | End: 2022-03-13 | Stop reason: HOSPADM

## 2022-03-10 RX ORDER — AZITHROMYCIN 250 MG/1
250 TABLET, FILM COATED ORAL AT BEDTIME
Status: COMPLETED | OUTPATIENT
Start: 2022-03-10 | End: 2022-03-11

## 2022-03-10 RX ADMIN — METHOCARBAMOL 500 MG: 500 TABLET ORAL at 20:41

## 2022-03-10 RX ADMIN — PREDNISONE 6 MG: 1 TABLET ORAL at 08:46

## 2022-03-10 RX ADMIN — PANTOPRAZOLE SODIUM 40 MG: 20 TABLET, DELAYED RELEASE ORAL at 08:47

## 2022-03-10 RX ADMIN — GUAIFENESIN AND DEXTROMETHORPHAN 10 ML: 100; 10 SYRUP ORAL at 10:51

## 2022-03-10 RX ADMIN — OXYCODONE HYDROCHLORIDE 5 MG: 5 TABLET ORAL at 08:47

## 2022-03-10 RX ADMIN — OXYCODONE HYDROCHLORIDE 5 MG: 5 TABLET ORAL at 18:03

## 2022-03-10 RX ADMIN — OXYCODONE HYDROCHLORIDE 5 MG: 5 TABLET ORAL at 22:25

## 2022-03-10 RX ADMIN — OXYCODONE HYDROCHLORIDE 5 MG: 5 TABLET ORAL at 03:04

## 2022-03-10 RX ADMIN — ENOXAPARIN SODIUM 40 MG: 100 INJECTION SUBCUTANEOUS at 17:08

## 2022-03-10 RX ADMIN — ACETAMINOPHEN 650 MG: 325 TABLET ORAL at 14:11

## 2022-03-10 RX ADMIN — ATORVASTATIN CALCIUM 10 MG: 10 TABLET, FILM COATED ORAL at 20:42

## 2022-03-10 RX ADMIN — LAMOTRIGINE 100 MG: 100 TABLET ORAL at 08:59

## 2022-03-10 RX ADMIN — HYDROCORTISONE: 1 CREAM TOPICAL at 21:03

## 2022-03-10 RX ADMIN — LAMOTRIGINE 100 MG: 100 TABLET ORAL at 20:42

## 2022-03-10 RX ADMIN — AZITHROMYCIN 250 MG: 250 TABLET, FILM COATED ORAL at 20:42

## 2022-03-10 RX ADMIN — DIAZEPAM 1 MG: 2 TABLET ORAL at 21:50

## 2022-03-10 RX ADMIN — DULOXETINE HYDROCHLORIDE 60 MG: 60 CAPSULE, DELAYED RELEASE PELLETS ORAL at 08:47

## 2022-03-10 RX ADMIN — METHOCARBAMOL 500 MG: 500 TABLET ORAL at 17:08

## 2022-03-10 RX ADMIN — CEFTRIAXONE SODIUM 2 G: 2 INJECTION, POWDER, FOR SOLUTION INTRAMUSCULAR; INTRAVENOUS at 20:42

## 2022-03-10 RX ADMIN — SODIUM CHLORIDE: 9 INJECTION, SOLUTION INTRAVENOUS at 03:06

## 2022-03-10 RX ADMIN — ACETAMINOPHEN 650 MG: 325 TABLET ORAL at 08:46

## 2022-03-10 RX ADMIN — LIDOCAINE: 50 OINTMENT TOPICAL at 08:48

## 2022-03-10 RX ADMIN — ACETAMINOPHEN 650 MG: 325 TABLET ORAL at 20:41

## 2022-03-10 RX ADMIN — METHOCARBAMOL 500 MG: 500 TABLET ORAL at 14:11

## 2022-03-10 RX ADMIN — GABAPENTIN 200 MG: 100 CAPSULE ORAL at 08:46

## 2022-03-10 RX ADMIN — LIDOCAINE: 50 OINTMENT TOPICAL at 20:40

## 2022-03-10 RX ADMIN — OXYBUTYNIN CHLORIDE 10 MG: 5 TABLET, FILM COATED, EXTENDED RELEASE ORAL at 20:41

## 2022-03-10 RX ADMIN — LIDOCAINE: 50 OINTMENT TOPICAL at 17:08

## 2022-03-10 RX ADMIN — GABAPENTIN 200 MG: 100 CAPSULE ORAL at 20:41

## 2022-03-10 ASSESSMENT — ACTIVITIES OF DAILY LIVING (ADL)
ADLS_ACUITY_SCORE: 13
ADLS_ACUITY_SCORE: 14
ADLS_ACUITY_SCORE: 11
ADLS_ACUITY_SCORE: 13
ADLS_ACUITY_SCORE: 14
ADLS_ACUITY_SCORE: 13
ADLS_ACUITY_SCORE: 14
ADLS_ACUITY_SCORE: 13
ADLS_ACUITY_SCORE: 14
ADLS_ACUITY_SCORE: 13
ADLS_ACUITY_SCORE: 11
ADLS_ACUITY_SCORE: 14
ADLS_ACUITY_SCORE: 14
ADLS_ACUITY_SCORE: 13
ADLS_ACUITY_SCORE: 14
ADLS_ACUITY_SCORE: 14

## 2022-03-10 NOTE — PROGRESS NOTES
Hospitalist Progress Note    Assessment/Plan    Principal Problem:    Pneumonia of right lower lobe due to infectious organism  Active Problems:    Bilateral back pain    Severe sepsis (H)    Type 2 diabetes mellitus with hyperglycemia (H)    Glycosuria           81-year-old patient with significant past medical history including hearing difficulty, diabetes, chronic back pain, with progressive spinal stenosis, anxiety, GERD, overactive bladder, presented to the hospital for evaluation of acute on chronic lower back pain, she was found to have severe sepsis with right middle lobe pneumonia     Severe sepsis  Secondary to community-acquired pneumonia,  In the ER she was tachycardic, had leukocytosis on the blood work, chest x-ray showed medial right lower lobe consolidation,  Blood culture obtained, continue to be negative  Swallow study ordered,  no evidence of aspiration  Sputum culture ordered, she could not make a sputum yet  Was initially on vancomycin and Zosyn, currently on Rocephin and azithromycin,  Lactic acid checked and came back 2  -CT chest showed evidence of pneumonia   We will consider switching to p.o. antibiotic tomorrow        Acute on chronic back pain with polyarthralgia,  Per the patient it became worse over last month previously she had gait assistance with cane but now bedbound due to weakness  , [facility],   recent MRI showed that spinal stenosis per the patient but it is not available to see, pain team has been consulted, MRI of cervical lumbar and thoracic spine  concerning for severe bilateral neural foraminal stenosis and compression of bilateral exiting cauda equina nerve roots left greater than right  -Spine surgery consulted had extensive discussion with the patient and her son regarding her current condition and prognosis,   also discussed with him about risks and benefits of doing surgery and not having surgery as well, they were more interested in palliative care  approach,  Spine surgery recommended to continue treatment of her pneumonia and reassess the need for surgery and the risk and benefits associated with it once her pneumonia clears  -Pain management team consulted, palliative care consulted and signed off  -Patient is having severe pain and tearful, she is asking if there is anything different that can be done and she does not believe that physical therapy will help her,         incidental soft tissue finding  On MRI thoracic spine there is small well circumscribed enhancing lesion along the left anterolateral paravertebral soft tissue measure 1.2 x 1.1 x 1.4 cm  Nonspecific finding could represent neurofibroma/schwannoma, nerve sheath tumor, metastatic lymph node or atypical metastatic mass, radiologist recommended CT chest for further assessment   -Neurosurgery consulted, CT chest ordered today showed an mass in the left anterior paravertebral fat posterior to the descending thoracic aorta,  Most likely represent lymph node, recommend 6-month follow-up CT   neurosurgery signed off  -I updated the son about this finding and the follow-up CT recommended in 6 months        Type 2 diabetes  Last year she had A1c checked it was 10  A1c 8.8,  Diabetic educator had met with her in the past and patient was not interested in insulin,  Currently on insulin while inpatient, holding Metformin Jardiance and glipizide        Hypertension  On triamterene hydrochlorothiazide for Ménière's disease and hypertension  Blood pressure is good control,        Ménière disease  On triamterene hydrochlorothiazide, prednisone, resumed     Bipolar disorder  Anxiety  Continue duloxetine, Lamictal, as needed Ativan        Overactive bladder  Resume oxybutynin     GERD   patient states she had  Occasional food stuck in her esophagus, on pantoprazole, will have a swallow study today     Goals of care discussion  Palliative care consulted, discussed with the patient, and her son, family not  interested in hospice,     Barriers to Discharge: Pain control    Anticipated discharge date/Disposition: TCU if qualifies in few days    Subjective  Patient was seen this morning she was tearful and she keeps explaining to me how bad is her back pain and she is not interested in surgery, she does not think physical therapy will help her because she was told this before and she did not have an improvement I provided emotional support, she feels like there is some mucus stuck in the back of her throat and she cannot bring it out    I called her son and gave him an update    Objective    Vital signs in last 24 hours  Temp:  [98.2  F (36.8  C)-98.8  F (37.1  C)] 98.8  F (37.1  C)  Pulse:  [73-91] 91  Resp:  [16-20] 18  BP: (147-152)/(66-71) 152/71  SpO2:  [92 %-96 %] 94 % [unfilled] O2 Device: None (Room air)    Weight:   [unfilled] Weight change:     Intake/Output last 3 shifts  I/O last 3 completed shifts:  In: 680 [P.O.:680]  Out: 1825 [Urine:1825]  Body mass index is 30.95 kg/m .    Physical Exam:  General Appearance: Alert, oriented x3, does not appear in distress.  HEENT: Normocephalic. No scleral icterus, . Mucous membranes moist.  Heart: :Regular rate and rhythm, normal S1 ,S2, No murmurs, no JVD, no pedal edema   Lungs: Clear to auscultation bilaterally. No wheezing or crackles  Abdomen: Soft, non tender, no rebound or rigidity, non distended, bowel sounds present.    Pertinent Labs   Lab Results: personally reviewed.   Recent Labs   Lab 03/07/22  1354      CO2 25   BUN 23     Recent Labs   Lab 03/08/22  0626 03/07/22  1354   WBC 19.5* 26.2*   HGB 8.5* 10.4*   HCT 31.1* 37.8    326     No results for input(s): CKTOTAL, TROPONINI in the last 168 hours.    Invalid input(s): TROPONINT, CKMBINDEX  Invalid input(s): POCGLUFGR    Medications  Drug and lactation database from the [place] [employer]:  http://toxnet.nlm.nih.gov/cgi-bin/sis/htmlgen?LACT          [facility]  Planning:  Discharge Planning discussed with patient and her son over the phone  Total time with this patient is 35 min with 50% of time spent in examining the patient, reviewing records, discussing plan of care and counseling, 50% of time spent in coordination of care.  Care discussed and coordinated with RN, care manager.      Hayder Mishra MD  Internal Medicine Hospitalist  3/10/2022

## 2022-03-10 NOTE — CONSULTS
[facility] ACUTE PAIN SERVICE CONSULTATION     Date of Admission:  3/7/2022  Date of Consult (When I saw the patient): 03/10/22  Physician requesting consult: Alec Faust NP   Reason for consult: acute on chronic pain  Primary Care Physician: Ananya Mclean NP     Assessment/Plan:     Tonya Yang is a 81 year old female who was admitted on 3/7/2022.  Pain team was asked to see the patient for acute on chronic pain. Admitted for back and leg pain, pneumonia of right lower lobe, sepsis, weakness of right lower extremity. Patient reporting cough x 1 week. History including DM2,  chronic low back pain radiating into RLE, hearing loss, anxiety, bipolar 1 disorder, chronic pain asthma, COPD, GERD, Meniere's disease,  HTN, hyperlipidemia, CKD, depression, IBS, neuropathy. Spine Surgery consulted.  Patient with recent injection but pain worsening again. Spine Surgery has evaluated, surgery not planned.    Describes pain as 6-9/10 and is constant, aching, pulling, radiating from low back to RLE and she reported she is with severe pain all of the time. She reports that even when taking Percocet at home , she is in significant pain.  Imaging reviewed.  Patient has pain in back, neck, arms and lower extremities.  The patient does not smoke and denies chemical dependency history.     Patient is followed by [facility], she is schedule for 2 epidural injections, and she reports minimal relief with previous injection. It appears they talked to her about Pain Pump, but she is worried about infection.  We discussed diet and physical therapy with her to regain her strength.  Cognitive behavioral therapy would most likely benefit her as well.         Opioid Induced Respiratory Depression Risk Assessment:?  High: age, concomitant CNS depressants, COPD, asthma, CKD.     PLAN:   1) Pain is consistent with worsening of chronic pain.   2)Multimodal Medication Therapy  Topical: Lidocaine ointment 5% qid, discontinue  patches  NSAID'S: CrCl 46 ml/min, No, CKD3, patient also on prednisone 6 mg daily  Muscle Relaxants: diazepam for anxiety - reports she takes 1-2x prn at home, not daily, only for severe anxiety. Will add Robaxin QID   Adjuvants: Tylenol 650 mg po tid, gabapentin 200 mg po bid  Antidepressants/anxiolytics: Cymbalta 60 mg daily, diazepam 0.5 mg bid prn for anxiety(home med)  Opioids:  Oxycodone 5 mg po qid prn - do not recommend increase in opioids or change to a different opioid medication for chronic pain. Opioids offer more risk than benefit here for chronic pain as patient is high risk for opioid induced respiratory depression due to risk factors as mentioned above.   IV Pain medication: DC  3)Non-medication interventions: ice, heat, rest, PT, OT  Acupuncture consult, Integrative consult -patient would like   4)Constipation Prophylaxis: senna/docusate 1-2 po bid prn    5) [place] [facility] [place]  -Opioid prescriber has been Ananya Antonio  -[place]  pulled from system on 03/08/22. This indicates current opioid use.  02/08/22 Percocet 5/325 #120  01/21/22 gabapentin 100 mg #360(90 day supply)  01/13/22 Percocet 5/325 #100  01/06/22 diazepam 2 mg #10  Discharge Recommendations - We recommend prescribing the following at the time of discharge: return to using Percocet as she had been before admission. Consider robaxin and topicals if effective. Follow up Primary Care Provider.      History of Present Illness (HPI):       Tonya Yang is a 81 year old old female who presented for worsening back and leg pain, weakness, now mostly bed bound, patient found to have pneumonia.   Past medical history as above. The pain is reported to be worsening of chronic back pain, radiating to LE.   Per [place]  review, the patient does  have an opioid tolerance.      Past pain treatments have included:steroid injections, last one recent, scheduled for repeat in the next few weeks.        Home pain medications/psych  medications/anticoagulation medications include: Percocet, gabapentin, Cymbalta, Lamictal    Medical History   PAST MEDICAL HISTORY:   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     Hepatic abscess      Hyperlipidemia      Hypertension      Irritable bowel syndrome      Meniere disease      Neuropathy      Noninfectious ileitis      Peritoneal abscess (H)      Spinal stenosis of lumbar region      Steroid long-term use      Vaginitis, atrophic, postmenopausal      Vitamin D deficiency        PAST SURGICAL HISTORY:   Past Surgical History:   Procedure Laterality Date     CATARACT IOL, RT/LT Left 7/2013     FOOT SURGERY      toe     GI SURGERY       LAPAROSCOPIC HEPATECTOMY PARTIAL  11/4/2013    Procedure: LAPAROSCOPIC HEPATECTOMY PARTIAL;  Laparoscopic Debridement of Liver Abcess;  Surgeon: Sepideh Green MD;  Location: [facility] OR     ORTHOPEDIC SURGERY       PICC INSERTION  8/28/2013    5fr DL Power PICC, 41cm (1cm external length) in the R lateral brachial vein with tip in the SVC RA junction.     RECTAL SURGERY      1970s     VASCULAR SURGERY         FAMILY HISTORY:   Family History   Problem Relation Age of Onset     Cerebrovascular Disease Mother      Heart Disease Mother      Heart Disease Father      Heart Disease Brother      Diabetes No family hx of      Coronary Artery Disease No family hx of      Hypertension No family hx of      Hyperlipidemia No family hx of      Breast Cancer No family hx of      Colon Cancer No family hx of      Prostate Cancer No family hx of      Other Cancer No family hx of      Depression No family hx of       Anxiety Disorder No family hx of      Mental Illness No family hx of      Substance Abuse No family hx of      Anesthesia Reaction No family hx of      Asthma No family hx of      Osteoporosis No family hx of      Genetic Disorder No family hx of      Thyroid Disease No family hx of      Obesity No family hx of      Unknown/Adopted No family hx of        SOCIAL HISTORY:   Social History     Tobacco Use     Smoking status: Former Smoker     Types: Cigarettes     Quit date: 11/15/1987     Years since quittin.3     Smokeless tobacco: Former User   Substance Use Topics     Alcohol use: Not Currently     Alcohol/week: 0.0 standard drinks     Comment: MALISSA white since         [facility]  Tobacco:  reports that she quit smoking about 34 years ago. Her smoking use included cigarettes. She has quit using smokeless tobacco.  Alcohol:  reports previous alcohol use.  Illicit drugs:  reports no history of drug use.    Allergies  Allergies   Allergen Reactions     Propofol Nausea and Vomiting     Shellfish Allergy      Other reaction(s): Dizziness       Problem List  Patient Active Problem List    Diagnosis Date Noted     Pneumonia of right lower lobe due to infectious organism 2022     Priority: Medium     Severe sepsis (H) 2022     Priority: Medium     Type 2 diabetes mellitus with hyperglycemia (H) 2022     Priority: Medium     Glycosuria 2022     Priority: Medium     Confusion 2021     Priority: Medium     Hyperglycemia 2021     Priority: Medium     Acute renal failure, unspecified acute renal failure type (H) 2021     Priority: Medium     Bilateral deafness 2018     Priority: Medium     Acute deep vein thrombosis (DVT) of left lower extremity, unspecified vein (H) 2013 w recurrence  2018     Priority: Medium     Class 2 obesity due to excess calories with serious comorbidity and body mass index (BMI) of 37.0 to 37.9 in adult 10/24/2017      Priority: Medium     Type 2 diabetes mellitus with diabetic nephropathy, without long-term current use of insulin (H) 02/21/2017     Priority: Medium     Muscle weakness (generalized) worse since 11-16 w drowsiness 02/14/2017     Priority: Medium     Chronic pain syndrome-issue of broken controlled sub agreement  04/25/2016     Priority: Medium     Patient is followed by Bethanie Finnegan MD for ongoing prescription of pain medication.  All refills should be approved by this provider, or covering partner.    AGREEMENT BROKEN  By getting meds from an outside provider  See 4-2016 notes     Medication(s): oxycodone a 5mgm /day .   Maximum quantity per month: 30   Clinic visit frequency required: Q 3 months     Controlled substance agreement on file: Yes       Date(s): 9-23-15    Pain Clinic evaluation in the past: No    DIRE Total Score(s):  No flowsheet data found.    Last [facility] website verification:  done on 5-2-16   [email or web address]         Bilateral leg edema worsening w her increasing inactivity  04/25/2016     Priority: Medium     Sensorineural hearing loss, bilateral 04/19/2016     Priority: Medium     Long-term (current) use of anticoagulants [Z79.01] 03/22/2016     Priority: Medium     Benign essential hypertension 03/22/2016     Priority: Medium     Ankle edema 03/22/2016     Priority: Medium     Microalbuminuria 03/17/2016     Priority: Medium     Leukocytosis w Lt shift  03/17/2016     Priority: Medium     Localized edema-L>R lat malleolus 02/20/2016     Priority: Medium     Mixed hyperlipidemia 02/18/2016     Priority: Medium     Primary insomnia 12/03/2015     Priority: Medium     Bilateral back pain 11/12/2015     Priority: Medium     Left-sided low back pain with left-sided sciatica since 1993 w reoccurrence 6-15  11/03/2015     Priority: Medium     Meniere's disease (cochlear hydrops), LT 11/03/2015     Priority: Medium     Steroid dependent for cochlear hydrops  since 1985   11/03/2015     Priority: Medium     Lower urinary tract infectious disease 10/25/2015     Priority: Medium     Diagnosis updated by automated process. Provider to review and confirm.       DVT (deep venous thrombosis), left 07/20/2015     Priority: Medium     Baker's cyst, left 07/17/2015     Priority: Medium     Diarrhea r/o exacerbated by metformin -since 30's 06/30/2015     Priority: Medium     Bipolar disorder, in full remission, most recent episode depressed (H) 06/04/2015     Priority: Medium     Temporomandibular joint disorder 12/01/2014     Priority: Medium     Problem list name updated by automated process. Provider to review       Elevated liver enzymes since 12-13 liver abscess I&D 01/16/2014     Priority: Medium     Abscess,& FB in liver in 4-10 & 4-11 w recurrent pain in RUQ  in 7-13 s/po lap I&D and unroofing sans finding of an FB  in 11-13 11/26/2013     Priority: Medium     Deafness 10/30/2013     Priority: Medium     Iron deficiency anemia due to chronic blood loss 10/03/2013     Priority: Medium     Health Care Home 08/30/2013     Priority: Medium     [facility]  Patient Care Plan  About Me  Patient Name:  Tonya Yang    YOB: 1940  Age:   72 year old   [facility] [place] MRN: [id number] Telephone Information:     Home Phone [phone]   Mobile [phone]       Address:    [facility] [place]  [address] [address] Email address:  No e-mail address on record      Emergency Contact(s)  Name Relationship Lgl Grd Work Phone Home Phone Mobile Phone   1. MANJU YANG Son   [phone]            Primary language:  English     needed? No   [facility]:  [phone] op. 1  Other communication barriers: Hard of hearing  Preferred Method of Communication:  Phone  Current living arrangement: I live in a private home with family  Mobility Status/ Medical Equipment:    Other information to know about me:    Health Maintenance  Immunizations:     Cancer  Screening:     My Access Plan  Medical Emergency 911   Primary Clinic Line [phone]   24 Hour Appointment Line [phone] or  [phone]) (toll-free)   24 Hour Nurse Line [phone] (toll-free)   Preferred Urgent Care     Preferred Hospital [facility]   Preferred Pharmacy [id number] - [place], [place] - [address]     Behavioral Health Crisis Line Crisis Connection, [phone] or [phone]     My Care Team Members  Patient Care Team       Relationship Specialty Notifications Start End    Bethanie Finnegan MD PCP - General Family Practice  8/22/13     Phone: [phone] Fax: [phone]         [facility] [address] [facility] [address]    Valencia Sanchez RN [facility] Care Coordinator   8/30/13     Phone: [phone] Fax: [phone]             My Care Plans  Self Management and Treatment Plan  Presenting Concern Signs and Symptoms Goal/Action Plan   Home IV antibiotics x 8 weeks  [facility] - [phone]   Start Date:8/30/13 Active/ Initiated with: Date Reviewed:           Start Date: Active/Complete Initiated with: Date Reviewed:           Start Date: Active/Complete Initiated with: Date Reviewed:       Action Plans on File: None  Advance Care Plans/Directives on file:  No           My Medical and Care Information  Problem List   Patient Active Problem List   Diagnosis     Postmenopausal atrophic vaginitis     Steroid dependent     Bipolar disorder     Chronic diarrhea     Type 2 diabetes, HbA1C goal < 7%     Hyperlipidemia LDL goal <100     IBS (irritable bowel syndrome)     Major depression in complete remission     Obesity     Meniere's disease (cochlear hydrops)     GERD (gastroesophageal reflux disease)     Hypertension goal BP (blood pressure) < 130/80     CKD (chronic kidney disease) stage 3, GFR 30-59 ml/min     Tobacco abuse: [age] y/o @ 2ppd= 65pk yr hx      Obstructive lung disease : moderate/spirometry w oximetry=91% on 7-18-13       Anemia     Abscess,& FB in liver in 4-10 & 4-11 w recurrent pain in RUQ  in 7-13      Hepatic abscess            Allergies   Allergies   Allergen Reactions     Propofol      Nausea and vomiting        Health Care Home Complexity Tier:      Care Coordination Start Date: 08/30/13   Frequency of Care Coordination: every 2 weeks   Form Last Updated: 08/30/2013            CKD (chronic kidney disease) stage 3, GFR 30-59 ml/min (H) 07/18/2013     Priority: Medium     IBS (irritable bowel syndrome) 06/20/2013     Priority: Medium     Meniere's disease (cochlear hydrops) - on prednisone and Diazide. 06/20/2013     Priority: Medium     GERD (gastroesophageal reflux disease) 06/20/2013     Priority: Medium     Mucopurulent chronic bronchitis (HCC): spirometry 7-18-13  FEV=79; FEV_FVC=65% 06/20/2013     Priority: Medium     Postmenopausal atrophic vaginitis 02/13/2013     Priority: Medium       Prior to Admission Medications   Medications Prior to Admission   Medication Sig Dispense Refill Last Dose     atorvastatin (LIPITOR) 10 MG tablet Take 10 mg by mouth At Bedtime   3/6/2022 at Unknown time     Barberry-Oreg Grape-Goldenseal (BERBERINE COMPLEX PO) Take 1 tablet by mouth daily   3/6/2022 at Unknown time     cyanocobalamin 1000 MCG SUBL Place 1,000 mcg under the tongue daily   3/6/2022 at Unknown time     diazepam (VALIUM) 1 MG/ML solution Take 0.5 mLs (0.5 mg) by mouth 3 times daily (Patient taking differently: Take 0.5 mg by mouth 2 times daily as needed ) 10 mL 0 Past Week at Unknown time     DULoxetine (CYMBALTA) 60 MG EC capsule TAKE 1 CAPSULE (60 MG) BY MOUTH DAILY 90 capsule 1 3/6/2022 at Unknown time     fluconazole (DIFLUCAN) 150 MG tablet Take 150 mg by mouth See Admin Instructions One dose prn yeast infection    Past Month at Unknown time     gabapentin (NEURONTIN) 100 MG capsule Take 2 capsules (200 mg) by mouth 2 times daily 60 capsule 0 3/6/2022 at Unknown time     glipiZIDE (GLUCOTROL) 10 MG tablet Take 2  tablets (20 mg) by mouth 2 times daily (before meals) (Patient taking differently: Take 10 mg by mouth 2 times daily (before meals) ) 120 tablet 0 3/6/2022 at Unknown time     JARDIANCE 25 MG TABS tablet Take 25 mg by mouth daily    3/6/2022 at Unknown time     lamoTRIgine (LAMICTAL) 100 MG tablet Take 100 mg by mouth 2 times daily   3/6/2022 at Unknown time     Lidocaine (LIDOCARE) 4 % Patch Place 3 patches onto the skin every 24 hours To prevent lidocaine toxicity, patient should be patch free for 12 hrs daily. (Patient taking differently: Place 3 patches onto the skin daily as needed To prevent lidocaine toxicity, patient should be patch free for 12 hrs daily.) 90 patch 0 Past Month at Unknown time     metFORMIN (GLUCOPHAGE) 500 MG tablet Take 0.5 tablets (250 mg) by mouth 2 times daily (with meals) 15 tablet 0 3/6/2022 at Unknown time     metoclopramide (REGLAN) 10 MG tablet Take 10 mg by mouth 3 times daily as needed Before meals as needed.   Unknown at Unknown time     nystatin (MYCOSTATIN) 522380 UNIT/GM external powder Apply topically 2 times daily as needed Breast rash   Past Month at Unknown time     omeprazole (PRILOSEC) 20 MG DR capsule Take 20 mg by mouth daily    3/6/2022 at Unknown time     oxybutynin ER (DITROPAN-XL) 10 MG 24 hr tablet Take 10 mg by mouth At Bedtime    3/6/2022 at Unknown time     oxyCODONE-acetaminophen (PERCOCET) 5-325 MG tablet Take 1 tablet by mouth every 4 hours as needed for severe pain   Past Week at Unknown time     predniSONE (DELTASONE) 1 MG tablet Take 6 mg by mouth daily 1mg tab and 5mg tab   3/6/2022 at Unknown time     triamcinolone (KENALOG) 0.1 % external cream Apply topically 2 times daily as needed    Past Month at Unknown time     triamterene-HCTZ (MAXZIDE-25) 37.5-25 MG tablet Take 1 tablet by mouth daily   3/6/2022 at Unknown time     vitamin D3 (CHOLECALCIFEROL) 50 mcg (2000 units) tablet Take 3 tablets by mouth daily   3/6/2022 at Unknown time     ACE/ARB NOT  "PRESCRIBED, INTENTIONAL, 1 each continuous prn ACE & ARB not prescribed due to CKD (Chronic Kidney Disease)        blood glucose (ACCU-CHEK FASTCLIX) lancing device FOR TESTING ONCE DAILY. DX  E11.9 TYPE 2 DIABETES        blood glucose (CONTOUR TEST) test strip TESTING EVERY DAY DX  E11.9        CONTOUR NEXT TEST test strip TESTING EVERY DAY DX  E11.9        HYDROmorphone (DILAUDID) 2 MG tablet Take 1 tablet (2 mg) by mouth every 4 hours as needed for severe pain 10 tablet 0      order for DME Equipment being ordered: wrist brace 1 Device 0        Review of Systems  Complete ROS reviewed, unless noted in HPI, all other systems reviewed (with patient) and all others found to be negative.      Objective:     Physical Exam:  BP (!) 152/71 (BP Location: Left arm)   Pulse 91   Temp 98.8  F (37.1  C) (Oral)   Resp 18   Ht 1.651 m (5' 5\")   Wt 84.4 kg (186 lb)   LMP  (LMP Unknown)   SpO2 94%   BMI 30.95 kg/m    Weight:   Vitals:    03/07/22 1200 03/08/22 0500   Weight: 83.9 kg (185 lb) 84.4 kg (186 lb)      Body mass index is 30.95 kg/m .    General Appearance:  Alert, cooperative, no distress, [place]   Head:  Normocephalic, without obvious abnormality, atraumatic   Eyes:  PERRL, conjunctiva/corneas clear, EOM's intact   ENT/Throat: Lips, mucosa, and tongue normal; teeth and gums normal   Lymph/Neck: Supple, symmetrical, trachea midline   Lungs:   Clear to auscultation bilaterally, respirations unlabored   Chest Wall:  No tenderness or deformity   Cardiovascular/Heart:  Regular rate and rhythm, S1, S2 normal,no murmur, rub or gallop.    Abdomen:   Soft, non-tender, bowel sounds active all four quadrants,  no masses, no organomegaly   Musculoskeletal: Extremities normal, atraumatic   Skin: Skin color, texture, turgor normal, no rashes or lesions   Neurologic: Alert and oriented X 3, Moves all 4 extremities     Psych: Affect is anxious     Imaging: Reviewed  Chest XR,  PA & LAT    Result Date: 3/7/2022  EXAM: XR CHEST 2 " VW LOCATION: [facility] DATE/TIME: 3/7/2022 4:12 PM INDICATION: cough, leukocytosis COMPARISON: None.     IMPRESSION: There is focal consolidation in the medial right lower lobe consistent with pneumonia. Left lung remains well-expanded without airspace opacity. Normal heart and mediastinal contours. No pleural effusion or pneumothorax.    MR Cervical Spine w/o & w Contrast    Result Date: 3/8/2022  EXAM: MR CERVICAL SPINE W/O and W CONTRAST LOCATION: [facility] DATE/TIME: 3/7/2022 10:08 PM INDICATION: Neck pain, acute, infection suspected. COMPARISON: None. CONTRAST: 8 mL Gadavist. TECHNIQUE: MRI Cervical Spine without and with IV contrast. FINDINGS: Normal vertebral body heights. There is stepwise grade I retrolisthesis at the levels of C3-C4, C4-C5 and C5-C6, favored to be degenerative. Trace grade I anterolisthesis is present at C7 on T1 measuring approximately 1 mm. There is mildly increased T1 marrow signal pattern, possibly reflecting fatty replacement in the setting of osteopenia. There is Modic type I degenerative endplate change noted at C7-T1.  Degenerative Schmorl's nodes are noted at C7-T1. There is patchy increased T2 signal within the central spinal cord centered at C7-T1.  No definite acute extraspinal abnormality identified. No prevertebral edema seen. There is no intramedullary enhancement identified. There is a ventral epidural as well as right lateral epidural enhancement centered above and below the level of C7-T1. No significant mass effect is noted on the thecal sac, however there is mild apparent mild associated effacement of the CSF. Craniovertebral junction and C1-C2: Normal. C2-C3: Disc desiccation. Disc height maintained. No significant disc herniation. Mild facet arthropathy on the left. Mild left neural foraminal stenosis. No right neural foraminal stenosis. No significant canal stenosis. C3-C4: Disc desiccation. Disc height  maintained. Trace posterior disc osteophyte complex. Minimal canal stenosis. No significant neural foraminal stenosis. C4-C5: Disc desiccation and disc height loss. Posterior disc osteophyte complex with effacement of the ventral CSF. Minimal ligamentum flavum thickening. Bilateral facet arthropathy. No significant right neural foraminal stenosis. Mild left neural foraminal stenosis. Moderate central spinal canal stenosis. C5-C6: Disc desiccation. Disc height loss. Posterior disc osteophyte complex with effacement of the ventral CSF. Ligamentum flavum thickening. Bilateral facet arthropathy. Left-sided uncovertebral joint hypertrophy. Severe left neural foraminal stenosis.  No significant right neural foraminal stenosis. Moderate to severe central spinal canal stenosis. C6-C7: Disc desiccation. Disc height loss. Ligamentum flavum thickening. Posterior disc osteophyte complex with effacement of the ventral CSF. Moderate to severe central spinal canal stenosis. Bilateral facet arthropathy. Moderate left neural foraminal stenosis. No definite right neural foraminal stenosis. C7-T1: Disc desiccation. Disc height loss. Left lateral recess disc osteophyte complex with a superimposed midline disc protrusion/extrusion with some degree of caudal migration. There is associated ligamentum flavum thickening at this level. There is severe central spinal canal stenosis and severe left lateral recess stenosis. Bilateral facet arthropathy is present. There is severe left neural foraminal stenosis and no right neural foraminal stenosis. Enhancement and edema is noted involving the endplates of C7 and T1, favored to be degenerative in etiology. Additionally there is enhancement and edema identified involving the right facet of C7 and T1 with mild adjacent surrounding enhancement. No intervertebral disc enhancement identified.     IMPRESSION: 1.  Prominent left lateral recess disc osteophyte complex at C7-T1 with an associated  superimposed midline disc protrusion/extrusion with caudal migration. At this level there is severe central spinal canal stenosis with severe left lateral recess stenosis and severe left neural foraminal stenosis. Patchy associated increased signal within the central spinal cord at this level may reflect edema versus myelomalacia. 2.  Edema and enhancement involving the endplates of C7 and T1 is favored to be degenerative in etiology, although infection could have a similar appearance. 3.  Mild ventral and right lateral epidural enhancement noted centered surrounding the C7-T1 intervertebral disc is favored to be reactive, although infection could have a similar appearance. Correlation for any signs/symptoms of infection is recommended. 4.  Nonspecific edema and enhancement noted involving the right C7-T1 facet joint, possibly degenerative versus infectious/inflammatory in etiology. 5.  Additional multilevel degenerative spondylolisthesis, as above.       MR Lumbar Spine w/o Contrast    Result Date: 3/8/2022  EXAM: MR LUMBAR SPINE W/O CONTRAST LOCATION: [facility] DATE/TIME: 3/7/2022 10:39 PM INDICATION: Myelopathy, acute or progressive. COMPARISON: None. TECHNIQUE: Routine Lumbar Spine MRI without IV contrast. FINDINGS: Nomenclature is based on 5 lumbar type vertebral bodies. Normal vertebral body heights. There is approximately 1 mm of anterolisthesis at L5 on S1. Mild levoconvex curvature of the lumbar spine is also noted. Overall, marrow signal pattern is normal for patient age. There are Modic type II degenerative endplate changes noted at L5-S1. Normal distal spinal cord. Conus terminates at L1. Redundancy of the cauda equina nerve roots is likely related to spinal canal stenosis. No definite acute extraspinal abnormality. Incompletely imaged cystic lesions are identified involving the bilateral kidneys. At T11-T12 there is disc height loss with disc desiccation. A degenerative  Schmorl's node is identified involving the superior endplate of T12. A prominent anterior disc osteophyte complex is present at T11-T12. No significant canal foraminal stenosis is  noted at this level. T12-L1: Normal disc signal and disc height. Minimal facet arthropathy. No disc herniation. No canal or foraminal stenosis. L1-L2: Normal disc height. Mild disc desiccation. Prominent anterolateral osteophyte. Mild facet arthropathy. Trace fluid in the right facet joint. No significant disc herniation. No canal or foraminal stenosis. L2-L3: Disc desiccation. Disc height loss. Fluid in the bilateral facet joints. Facet arthropathy. Ligamentum flavum thickening. Concentric disc bulge. Moderate central spinal canal stenosis. No significant left neural foraminal stenosis. Mild right neural foraminal stenosis. L3-L4: Disc desiccation. Disc height loss. Bilateral facet arthropathy. Fluid in the facet joints. Ligamentum flavum thickening. Broad-based disc bulge. Severe central spinal canal stenosis. Mild left neural foraminal stenosis. Mild to moderate right neural foraminal stenosis. L4-L5: Disc desiccation and disc height loss. Bilateral facet arthropathy. Fluid in the facet joints. Ligamentum flavum thickening. Broad-based disc bulge. Severe central spinal canal stenosis. Severe bilateral subarticular zone stenosis. Moderate left neural foraminal stenosis. Mild right neural foraminal stenosis. L5-S1: Disc desiccation. Disc height loss. Bilateral facet arthropathy. Ligamentum flavum thickening. Fluid in the facet joints, left greater than right. Concentric disc bulge, more eccentric to the left laterally and within the left neural foramen. Small high intensity zone is noted within the posterior margin of the L5-S1 intervertebral disc. Mild central spinal canal stenosis. Severe left neural foraminal stenosis with compression of the exiting left L5 nerve root. Severe right neural foraminal stenosis with compression of the  exiting right L5 nerve root.     IMPRESSION: 1.  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 2.  At L4-L5 there is severe central spinal canal stenosis with severe bilateral subarticular zone stenosis, moderate left neural foraminal stenosis and mild right neural foraminal stenosis. 3.  At L5-S1 there is mild central spinal canal stenosis with severe bilateral neural foraminal stenosis and compression of the bilateral exiting cauda equina nerve roots, left greater than right. 4.  At L3-L4 there is severe central spinal canal stenosis with mild left and mild to moderate right neural foraminal stenosis. 5.  At L2-L3 there is moderate central spinal canal stenosis with mild right neural foraminal stenosis. 6.  Multilevel fluid in the facet joints, as described above, is favored to be degenerative in etiology, however, an infectious/inflammatory facet synovitis could have a similar appearance.        MR Thoracic Spine w/o & w Contrast    Result Date: 3/8/2022  EXAM: MR THORACIC SPINE W/O and W CONTRAST LOCATION: [facility] DATE/TIME: 3/7/2022 10:59 PM INDICATION: Myelopathy, acute or progressive. COMPARISON: None. CONTRAST: 8 mL Gadavist TECHNIQUE: Routine Thoracic Spine MRI without and with IV contrast. FINDINGS: Normal vertebral body heights and alignment. Marrow signal pattern is normal for patient age. No definite suspicious marrow edema is noted. There is a small subcentimeter focus of enhancement identified involving the ventral aspect of the T7 spinous process measuring approximately 2 mm (image 8 series 8). This lesion appears to demonstrate subtle increased T1 and T2 signal, favored to reflect a lipid-poor hemangioma. There is multilevel degenerative disc desiccation and disc height loss. Multilevel facet arthropathy is also present. At T2-T3 there is a prominent midline disc bulge which effaces the ventral CSF and contributes to mild central  spinal canal stenosis. No significant neural foraminal stenosis present at this level. At T5-T6 there is a trace broad-based disc bulge. Mild central spinal canal stenosis is present. No neural foraminal stenosis. At T6-T7 there is a mild concentric disc bulge which effaces the ventral CSF. There is mild central spinal canal stenosis at this level without significant neural foraminal stenosis. At T7-T8 there is a concentric disc bulge with a superimposed left lateral recess/foraminal protrusion. At this level there is mild left hemicanal stenosis with mild left foraminal stenosis. No significant right neural foraminal stenosis is present. Mild  enhancement along the left lateral aspect of the disc is favored to be reactive. At T8-T9 there is a mild concentric disc bulge, slightly eccentric to the right. Minimal central spinal canal stenosis is present. No neural foraminal stenosis is present. No definite abnormal spinal cord signal. No suspicious intramedullary or leptomeningeal enhancement. There is a small T2 hypointense, STIR hyperintense well-circumscribed lesion along the left anterolateral paravertebral soft tissues measuring 1.2 x 1.1 cm in greatest axial dimensions and 1.4 cm craniocaudal dimensions (image 13 series 6 and image 23 series 7 and image 23 series 9). This lesion demonstrates avid postcontrast enhancement. Atelectatic changes are noted involving the lungs.     IMPRESSION: 1.  No abnormal spinal cord signal or intramedullary/leptomeningeal enhancement. 2.  Multilevel degenerative changes, as above. 3.  Small T2 hypointense, STIR hyperintense well-circumscribed enhancing lesion along the left anterolateral paravertebral soft tissues measuring 1.2 x 1.1 x 1.4 cm (AP x TV x CC). This finding is nonspecific but could reflect a neurofibroma/schwannoma, nerve sheath tumor, metastatic lymph node, atypical mediastinal mass or other lesion. Consider dedicated CT chest for further assessment.    CT Chest w  Contrast    Result Date: 3/9/2022  EXAM: CT CHEST W CONTRAST LOCATION: [facility] DATE/TIME: 3/9/2022 2:48 PM INDICATION: Indeterminate 1.4 cm left anterior paravertebral mass at the T8 level observed at recent 03/07/2022 MRI thoracic spine and CT CAP 10/01/2013. COMPARISON: MRI of thoracic spine 03/07/2022. TECHNIQUE: CT chest with IV contrast. Multiplanar reformats were obtained. Dose reduction techniques were used. CONTRAST: 75ml Isovue 370 FINDINGS: LUNGS AND PLEURA: Nodular and groundglass density opacities and  interlobular septal thickening in a peribronchial distribution central right upper, middle and lower lobes consistent with pneumonia. Mild bronchial wall thickening and upper lung centrilobular emphysema. Lungs otherwise clear. No pleural effusion. MEDIASTINUM/AXILLAE: Minimal enlargement right paratracheal, subcarinal and hilar lymph nodes likely reactive. Circumscribed 1.8 x 1.4 x 1.1 cm mass left anterior paravertebral fat posterior to the descending thoracic aorta (series 4 image 81) is unchanged since 03/07/2022 MRI thoracic spine but new compared with 10/01/2013 CT and most likely represents a lymph node. A lymph node in the upper left chest wall subcutaneous fat is unchanged since 2013 (image 27 of series 4). Heart size normal with no pericardial effusion. Normal caliber thoracic aorta. Enlargement central pulmonary arteries with the diameter of the main pulmonary artery 3.8 cm. Chronic patulous dilatation of the esophagus. CORONARY ARTERY CALCIFICATION: Extensive calcified atheromatous plaque throughout the LAD coronary artery. UPPER ABDOMEN: Moderate to severe hepatic steatosis and mild hepatomegaly with some degree of underlying steatohepatitis likely. Partially visualized benign-appearing renal cysts. MUSCULOSKELETAL: Spondylotic change throughout the thoracic spine.     IMPRESSION: 1.  Circumscribed 1.8 x 1.4 x 1.1 cm mass left anterior paravertebral fat posterior  to the descending thoracic aorta (series 4 image 81) is unchanged since 03/07/2022 MRI thoracic spine but new compared with 10/01/2013 CT and most likely represents a lymph node. Recommend 6 month follow-up CT. 2.  Nodular and groundglass density opacities and interlobular septal thickening in a peribronchial distribution central right upper, middle and lower lobes consistent with pneumonia with associated right reactive mediastinal and hilar lymph node enlargement. 3.  Mild bronchial wall thickening and upper lung centrilobular emphysema. 4.  Enlargement central pulmonary arteries compatible with some degree of chronic pulmonary arterial hypertension. 5.  Chronic patulous dilatation of the esophagus. 6.  Moderate to severe hepatic steatosis and mild hepatomegaly with some degree of underlying steatohepatitis likely.     XR Video Swallow with SLP or OT    Result Date: 3/9/2022  EXAM: XR VIDEO SWALLOW WITH SLP OR OT LOCATION: [facility] DATE/TIME: 3/9/2022 3:46 PM INDICATION: Difficulty swallowing. COMPARISON: None. TECHNIQUE: Routine swallow study with speech pathology using multiple barium thicknesses. FINDINGS: FLUOROSCOPIC TIME: .6 minutes NUMBER OF IMAGES: 5 Swallow study with Speech Pathology using multiple barium thicknesses. Small amount of penetration with thin liquid. Undercoating noted with puree. No other penetration or aspiration.       Labs: Reviewed   Recent Results (from the past 24 hour(s))   Glucose by meter    Collection Time: 03/09/22  5:12 PM   Result Value Ref Range    GLUCOSE BY METER POCT 213 (H) 70 - 99 mg/dL   Glucose by meter    Collection Time: 03/09/22  8:53 PM   Result Value Ref Range    GLUCOSE BY METER POCT 154 (H) 70 - 99 mg/dL   Glucose by meter    Collection Time: 03/10/22  9:46 AM   Result Value Ref Range    GLUCOSE BY METER POCT 218 (H) 70 - 99 mg/dL   Glucose by meter    Collection Time: 03/10/22 12:24 PM   Result Value Ref Range    GLUCOSE BY METER POCT  221 (H) 70 - 99 mg/dL       Total time spent 80 minutes with greater than 50% in consultation, education and coordination of care, examination of patient, review of medical record, lab and imaging results, completion of documentation, and discussion with RN, MD, Senior Acupuncturist, and pharmacist.      Thank you for this consultation.    TIERRA CarboneP-C  Acute Care Pain Management Program  [facility] ([place], [place], [place])  Monday-Friday 8a-4p   Page via online paging system or call [phone]

## 2022-03-10 NOTE — PLAN OF CARE
Problem: Pain Chronic (Persistent)  Goal: Acceptable Pain Control and Functional Ability  Outcome: Ongoing, Progressing  Intervention: Develop Pain Management Plan  Recent Flowsheet Documentation  Taken 3/10/2022 0850 by Gaby Contreras RN  Pain Management Interventions: repositioned  Intervention: Manage Persistent Pain  Recent Flowsheet Documentation  Taken 3/10/2022 0850 by Gaby Contreras RN  Medication Review/Management: medications reviewed     Problem: Fluid Imbalance (Pneumonia)  Goal: Fluid Balance  Outcome: Ongoing, Progressing     Problem: Infection (Pneumonia)  Goal: Resolution of Infection Signs and Symptoms  Outcome: Ongoing, Progressing     Problem: Respiratory Compromise (Pneumonia)  Goal: Effective Oxygenation and Ventilation  Outcome: Ongoing, Progressing  Intervention: Promote Airway Secretion Clearance  Recent Flowsheet Documentation  Taken 3/10/2022 0850 by Gaby Contreras RN  Cough And Deep Breathing: done with encouragement     Goal Outcome Evaluation:      Patient is A&Ox4, and is currently awake. Patient continues to experience pain when moving, and was given both scheduled and PRN medications to intervene. New IV was placed in RUE due to inflammation at prior site. Tolerating diet and ambulation (pivot to chair or commode only), and plan of care to continue per providers and care teams.     Gaby Contreras, RN

## 2022-03-10 NOTE — PROGRESS NOTES
Care Management Follow Up    Length of Stay (days): 3        Patient plan of care discussed at interdisciplinary rounds: Yes     Expected Discharge Date:   3/11/2022       Concerns to be Addressed / Barriers to Discharge: medical management, pain management,      Anticipated Discharge Disposition: pending        Patient/family educated on Medicare website which has current facility and service quality ratings:  Yes    Education Provided on the Discharge Plan:   yes  Patient/Family in Agreement with the Plan:   yes    Referrals Placed by CM/SW: yes  Private pay costs discussed: Not applicable     Additional Information:  3/10/2022  Received voicemail while in another patient's room from patient's son, Davie re: requesting return call to discuss discharge planning. He is working from 13- 1900 today.     Received update from April, PT- son currently in patient's room and requesting CM to stop in to discuss her recommendation for TCU.     When this writer entered patient's room, patient discussing her pain with pain team in room so this writer and patient's son met in [facility] to discuss discharge planning options. Patient's son, Davie,  is her PCA and noted in last 2 weeks prior to hospital admission she was getting progressively weaker and unable to stand up without assist or remain stand upright on her own, even with walker. Therapy currently recommending TCU and son reports his mom and him are in agreement with TCU. Discussed TCU referral process. Given [facility] list [facility] with Medicare.gov website for facility comparisons and ratings. He would prefer a TCU in [facility] that is closer to their [place] [address]. Per son, he would provide transportation at discharge. Discussed with current COVID in community impacting TCU bed availability prefer at least 3 -5 choices. First choice [facility] (same as his mother's choice yesterday). Other choices [facility], [facility], [facility]), [facility], or [facility]. (Referrals sent)     Called and spoke with admissions  [facility]- need to review. No availability Fri, possible Sat 3/12  [place] - full  [place]- need to review  [facility] - need to review  [facility] - need to review; no weekend admissions  [facility] - no beds til Tu 3/15; IF still need a bed CM to follow up Mon 3/14/   [facility]- not accepting admissions currently    Called and left message for [facility] re: change in discharge disposition to TCU. Referrals out.      Received return call from Ami in admissions at [facility]- no TCU bed available until Sat 3/12 but willing to accept to TCU. Awaiting medical clearance.   Called and left message for patient's sonDavie with update re: no accepting facility yet for Fri 3/11, potential bed at [facility] on Sat.      CM following.

## 2022-03-10 NOTE — PROGRESS NOTES
03/10/22 1045   Quick Adds   Type of Visit Initial PT Evaluation   Living Environment   People in Home alone   Current Living Arrangements apartment   Home Accessibility no concerns   Self-Care   Equipment Currently Used at Home walker, rolling;cane, straight   General Information   Onset of Illness/Injury or Date of Surgery 03/07/22   Patient/Family Therapy Goals Statement (PT) restorative, get stronger   Pertinent History of Current Problem (include personal factors and/or comorbidities that impact the POC) progressive weakness, increasing pain, spinal stenosis   Existing Precautions/Restrictions fall   Strength (Manual Muscle Testing)   Strength Comments grossly weak throughout   Bed Mobility   Bed Mobility supine-sit;sit-supine;scooting/bridging   Scooting/Bridging Cooper (Bed Mobility) maximum assist (25% patient effort);2 person assist   Sit-Supine Cooper (Bed Mobility) moderate assist (50% patient effort)   Bed Mobility Limitations decreased ability to use legs for bridging/pushing   Impairments Contributing to Impaired Bed Mobility pain;decreased strength   Assistive Device (Bed Mobility) bed rails   Transfers   Transfers sit-stand transfer   Transfer Safety Concerns Noted decreased step length   Impairments Contributing to Impaired Transfers decreased strength;pain   Sit-Stand Transfer   Sit-Stand Cooper (Transfers) verbal cues;moderate assist (50% patient effort)   Assistive Device (Sit-Stand Transfers) walker, front-wheeled   Gait/Stairs (Locomotion)   Cooper Level (Gait) minimum assist (75% patient effort)   Assistive Device (Gait) walker, front-wheeled   Distance in Feet (Required for LE Total Joints) 3 x 2   Deviations/Abnormal Patterns (Gait) juarez decreased;festinating/shuffling;stride length decreased;weight shifting decreased   Clinical Impression   Criteria for Skilled Therapeutic Intervention Yes, treatment indicated   PT Diagnosis (PT) impaired functional mobility    Influenced by the following impairments transfers, gait   Functional limitations due to impairments weakness, pain   Clinical Presentation (PT Evaluation Complexity) Evolving/Changing   Clinical Presentation Rationale pt presents as medically diagnosed   Clinical Decision Making (Complexity) moderate complexity   Planned Therapy Interventions (PT) bed mobility training;gait training;home exercise program;patient/family education;strengthening;transfer training   Anticipated Equipment Needs at Discharge (PT) walker, rolling;gait belt;wheelchair   Risk & Benefits of therapy have been explained evaluation/treatment results reviewed;care plan/treatment goals reviewed;patient;son   PT Discharge Planning   PT Discharge Recommendation (DC Rec) Transitional Care Facility   PT Rationale for DC Rec limited mobility, high fall risk   Total Evaluation Time   Total Evaluation Time (Minutes) 15   Physical Therapy Goals   PT Frequency Daily   PT Predicated Duration/Target Date for Goal Attainment 03/16/22   PT Goals Transfers;Gait   PT: Transfers Minimal assist;Assistive device;Sit to/from stand   PT: Gait Minimal assist;Rolling walker;10 feet

## 2022-03-10 NOTE — PROGRESS NOTES
"Speech Therapy - Video Swallow Study     03/09/22 1360   General Information   Onset of Illness/Injury or Date of Surgery 03/07/22   Referring Physician Dr. Mishra   Pertinent History of Current Problem Per Dr. Mishra's note dated 3/8/22, \"81-year-old patient with significant past medical history including hearing difficulty, diabetes, chronic back pain, with progressive spinal stenosis, anxiety, GERD, overactive bladder, presented to the hospital for evaluation of acute on chronic lower back pain, she was found to have severe sepsis with right middle lobe pneumonia.\" CXR revealed focal consolidation in the medial right lower lobe consistent with pneumonia. No overt clinical signs or symptoms of aspiration observed during bedside swallow study on 3/8/22. Video swallow study requested to rule out silent aspiration.   Past History of Dysphagia No reported history of oropharyngeal dysphagia. Per her own report, patient has a signficant history of esophageal dysphagia. Her son confirmed this during bedside swallow study on 3/8.   Type of Evaluation   Type of Evaluation Swallow Evaluation   General Swallowing Observations   Current Diet/Method of Nutritional Intake (General Swallowing Observations, NIS) mildly thick liquids (level 2);regular diet  (Liquids were reportedly downgraded to mildly thick by MD)   Respiratory Support (General Swallowing Observations) none   Swallowing Evaluation Videofluoroscopic swallow study (VFSS)   VFSS Evaluation   Radiologist Dr. Bowers   Views Taken left lateral   Physical Location of Procedure [facility]; Radiology Dept   VFSS Textures Trialed thin liquids;pureed   VFSS Eval: Thin Liquid Texture Trial   Mode of Presentation, Thin Liquid cup;straw;self-fed   Order of Presentation Trials 1, 2, 3, and 6   Preparatory Phase Other (see comments)  (Minimally reduced bolus control on 1-2 occasions)   Oral Phase, Thin Liquid Premature pharyngeal entry  (Mild " "premature spillage to the valleculae x1)   Pharyngeal Phase, Thin Liquid Delayed swallow reflex   Rosenbek's Penetration Aspiration Scale: Thin Liquid Trial Results 2 - contrast enters airway, remains above the vocal cords, no residue remains (penetration)   Diagnostic Statement Swallow response was inconsistently delayed with boluses of thin liquid, resulting in variable pourover past the epiglottis and minimal transient laryngeal penetration. This was shallow and cleared spontaneously.   VFSS Evaluation: Puree Solid Texture Trial   Mode of Presentation, Puree spoon;fed by clinician   Order of Presentation Trials 4 and 5  (Images from trial #4 did not save)   Preparatory Phase WFL   Oral Phase, Puree WFL   Pharyngeal Phase, Puree Delayed swallow reflex  (Swallow response occurred at the valleculae)   Rosenbek's Penetration Aspiration Scale: Puree Food Trial Results 1 - no aspiration, contrast does not enter airway  (Minimal epiglottic undercoating noted x1)   Diagnostic Statement Swallow response was mildly delayed. Swallow triggered at the valleculae. There was a single episode of minimal epiglottic undercoating with puree consistency. No true laryngeal penetration observed. No aspiration. No oropharyngeal stasis noted after swallows.   Esophageal Phase of Swallow   Patient reports or presents with symptoms of esophageal dysphagia Yes   Esophageal comments Per Dr. Mishra's note dated 3/8/22, \"she was also talking to me about food stuck in her esophagus and occasional coughing.\" Chest CT from earlier this PM showed chronic patulous dilatation of the esophagus.   Swallowing Recommendations   Diet Consistency Recommendations thin liquids (level 0);regular diet   Supervision Level for Intake close supervision needed   Mode of Delivery Recommendations bolus size, small;slow rate of intake   Swallowing Maneuver Recommendations alternate food and liquid intake   Monitoring/Assistance Required (Eating/Swallowing) stop " eating activities when fatigue is present   Recommended Feeding/Eating Techniques (Swallow Eval) maintain upright posture during/after eating for 30 minutes;minimize distractions during oral intake;set-up and prepare tray   Medication Administration Recommendations, Swallowing (SLP) Patient may take pills with liquids as tolerated. Limit to 1-2 at a time. If any difficulty is noted with this method, give pills 1-2 at a time, mixed in puree.   General Therapy Interventions   Planned Therapy Interventions Dysphagia Treatment   Dysphagia treatment Instruction of safe swallow strategies  (Standard GERD precautions)   Clinical Impression   Criteria for Skilled Therapeutic Interventions Met (SLP Eval) Yes, treatment indicated   SLP Diagnosis Suspected esophageal dysphagia   Risks & Benefits of therapy have been explained evaluation/treatment results reviewed;care plan/treatment goals reviewed;participants voiced agreement with care plan;participants included;patient   Clinical Impression Comments Video swallow study completed. Mild oropharyngeal dysphagia noted. Overall, patient's swallow function is WNL for her age group. Aspiration risk appears minimal. Patient is appropriate for oral diet of Regular textures and thin liquids with the strategies listed above. Anticipate 1-2 more sessions of Speech Therapy to verify diet tolerance and reinforce safe swallowing strategies as well as standard GERD precautions. If patient is experiencing aspiration, it is more likely occurring after the swallow from reflux. She experiences frequent belching during meals as well as some regurgitation of food/liquid at times. Per son's report, she has a longstanding history of GERD and poor compliance with dietary recommendations. Anticipate that patient may benefit from a GI consult.   SLP Discharge Planning   SLP Discharge Recommendation Transitional Care Facility  (Due to PT/OT needs)   SLP Rationale for DC Rec Swallow function appears to  be at baseline. Further Speech Therapy is not anticipated at discharge.   SLP Brief overview of current status  Patient appropriate for oral diet of Regular textures and thin liquids.    Total Evaluation Time   Total Evaluation Time (Minutes) 20

## 2022-03-10 NOTE — PROGRESS NOTES
"Ortho Progress Note            Subjective:  Pain: no changes in pain  Chest pain, SOB:  no  Nausea, vomiting:  no  Lightheadedness, dizziness:  no  Neuro:  Patient denies new onset numbness or paresthesias    Objective:  BP (!) 152/71 (BP Location: Left arm)   Pulse 91   Temp 98.8  F (37.1  C) (Oral)   Resp 18   Ht 1.651 m (5' 5\")   Wt 84.4 kg (186 lb)   LMP  (LMP Unknown)   SpO2 94%   BMI 30.95 kg/m    Gen: A&O x 3. NAD. Up sitting in chair eating lunch today.    Wound status: none  Circulation, motion and sensation: Dorsiflexion/plantarflexion intact and equal bilaterally; distal lower extremity sensation is intact and equal bilaterally   Swelling: mild   Calf tenderness: calves are soft and non-tender bilaterally         Pertinent Labs   Lab Results: personally reviewed.   Lab Results   Component Value Date    INR 0.9 09/06/2016    INR 2.6 07/26/2016    INR 2.1 06/14/2016    INR 2.1 06/14/2016     Lab Results   Component Value Date    WBC 19.5 (H) 03/08/2022    HGB 8.5 (L) 03/08/2022    HCT 31.1 (L) 03/08/2022    MCV 81 03/08/2022     03/08/2022     Lab Results   Component Value Date     03/07/2022    CO2 25 03/07/2022       Assessment:     Multiple medical comorbidities and active pneumonia.   Type 2 diabetes with severe peripheral neuropathy.  She has cervical stenosis with cervical myelopathy in addition to severe lumbar spinal stenosis.  She has chronic neck pain, arm pain, low back pain, and lower extremity pain with increasing dysfunction in her lower extremities.      Plan:   I have spoken to Dr. Tani Velásquez MD directly,  and to the hospitalist about ordering this injection for pain control.  Per Dr. Velásquez would not recommend C-spine injection at this point.  We will order an injection for the lumbar spine.  Will order IR consult for bilateral L5  transforaminal epidural.   Continue PT/OT  Weightbearing status:WBAT  Anticoagulation:  SCDs, tamia stockings and early " ambulation.  Discharge planning: Pending    Report completed by:  Naga Arellano PA-C  Date: 3/10/2022  Time: 1:32 PM

## 2022-03-10 NOTE — PLAN OF CARE
Patient is A&Ox4.  Patient continues to have a lot of pain in her back, rating 8/10.  Oxycodone was administered, but would only give relief for a short time.  Patient was repositioned as tolerated. VSS.

## 2022-03-10 NOTE — PROGRESS NOTES
Neurointerventional Surgery    Received request for bilateral L5 transforaminal epidural steroid injection     Imaging   EXAM: MR LUMBAR SPINE W/O CONTRAST  LOCATION: [facility]  DATE/TIME: 3/7/2022 10:39 PM                                                   IMPRESSION:  1.  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  2.  At L4-L5 there is severe central spinal canal stenosis with severe bilateral subarticular zone stenosis, moderate left neural foraminal stenosis and mild right neural foraminal stenosis.  3.  At L5-S1 there is mild central spinal canal stenosis with severe bilateral neural foraminal stenosis and compression of the bilateral exiting cauda equina nerve roots, left greater than right.  4.  At L3-L4 there is severe central spinal canal stenosis with mild left and mild to moderate right neural foraminal stenosis.  5.  At L2-L3 there is moderate central spinal canal stenosis with mild right neural foraminal stenosis.   6.  Multilevel fluid in the facet joints, as described above, is favored to be degenerative in etiology, however, an infectious/inflammatory facet synovitis could have a similar appearance.          ASSESSMENT:  81 year old admitted with pneumonia, diabetes, peripheral neuropathy, cervical stenosis, and severe lumbar spinal stenosis. Ortho requesting bilateral L5 transforaminal epidural steroid injection.       PLAN: Hold blood thinners after midnight   We will plan for ROSA 3/11 - still need to discuss with patient and son. Attempted to call son, but no answer.   Wait for final blood cultures     __________________________________________________      ADDENDUM: Son, Davie, called back. He gives consent for procedure.           Neurointerventional Surgery  Pre Procedure Note    History and physical reviewed and no updates needed.     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     Hepatic abscess      Hyperlipidemia      Hypertension      Irritable bowel syndrome      Meniere disease      Neuropathy      Noninfectious ileitis      Peritoneal abscess (H)      Spinal stenosis of lumbar region      Steroid long-term use      Vaginitis, atrophic, postmenopausal      Vitamin D deficiency      Past Surgical History:   Procedure Laterality Date     CATARACT IOL, RT/LT Left 7/2013     FOOT SURGERY      toe     GI SURGERY       LAPAROSCOPIC HEPATECTOMY PARTIAL  11/4/2013    Procedure: LAPAROSCOPIC HEPATECTOMY PARTIAL;  Laparoscopic Debridement of Liver Abcess;  Surgeon: Sepideh Green MD;  Location: [facility] OR     ORTHOPEDIC SURGERY       PICC INSERTION  8/28/2013    5fr DL Power PICC, 41cm (1cm external length) in the R lateral brachial vein with tip in the SVC RA junction.     RECTAL SURGERY      1970s     VASCULAR SURGERY           Allergies:   Allergies   Allergen Reactions     Propofol Nausea and Vomiting     Shellfish Allergy      Other reaction(s): Dizziness       Medications:  Current Facility-Administered Medications:      acetaminophen (TYLENOL) tablet 650 mg, 650 mg, Oral, TID, Hayder Mishra MD, 650 mg at 03/10/22 1411     atorvastatin (LIPITOR) tablet 10 mg, 10 mg, Oral, At Bedtime, Alec Faust DO, 10 mg at 03/09/22 2108     azithromycin (ZITHROMAX) 250 mg in sodium chloride 0.9 % 250 mL intermittent infusion, 250 mg, Intravenous, Q24H, Alec Faust DO, 250 mg at 03/09/22 2114     cefTRIAXone (ROCEPHIN) 2 g vial to attach to  ml bag for ADULTS or NS 50 ml bag for PEDS, 2 g, Intravenous, Q24H, Alec Faust DO, 2 g at 03/09/22 1941     glucose  gel 15-30 g, 15-30 g, Oral, Q15 Min PRN **OR** dextrose 50 % injection 25-50 mL, 25-50 mL, Intravenous, Q15 Min PRN **OR** glucagon injection 1 mg, 1 mg, Subcutaneous, Q15 Min PRN, Govind, Alec, DO     diazepam (VALIUM) half-tab 1 mg, 1 mg, Oral, Q6H PRN, Hayder Mishra MD, 1 mg at 03/09/22 1432     DULoxetine (CYMBALTA) DR capsule 60 mg, 60 mg, Oral, Daily, Ale Faustin, DO, 60 mg at 03/10/22 0847     enoxaparin ANTICOAGULANT (LOVENOX) injection 40 mg, 40 mg, Subcutaneous, Q24H, Hayder Mishra MD, 40 mg at 03/09/22 1628     gabapentin (NEURONTIN) capsule 200 mg, 200 mg, Oral, BID, Ale Faustin, DO, 200 mg at 03/10/22 0846     guaiFENesin-dextromethorphan (ROBITUSSIN DM) 100-10 MG/5ML syrup 10 mL, 10 mL, Oral, Q4H PRN, Hayder Mishra MD, 10 mL at 03/10/22 1051     insulin aspart (NovoLOG) injection (RAPID ACTING), 1-7 Units, Subcutaneous, TID AC, Govind Alec, DO, 2 Units at 03/10/22 1248     insulin aspart (NovoLOG) injection (RAPID ACTING), 1-5 Units, Subcutaneous, At Bedtime, Govind Alec, DO     lamoTRIgine (LaMICtal) tablet 100 mg, 100 mg, Oral, BID, Govind, Alec, DO, 100 mg at 03/10/22 0859     lidocaine (LMX4) cream, , Topical, Q1H PRN, Govind, Alec, DO     lidocaine (XYLOCAINE) 5 % ointment, , Topical, 4x Daily, Venita Cano, APRN CNP     lidocaine 1 % 0.1-1 mL, 0.1-1 mL, Other, Q1H PRN, Faust, Alec, DO     melatonin tablet 1 mg, 1 mg, Oral, At Bedtime PRN, Govind, Alec, DO, 1 mg at 03/09/22 2300     methocarbamol (ROBAXIN) tablet 500 mg, 500 mg, Oral, 4x Daily, Venita Cano, APRN CNP, 500 mg at 03/10/22 1411     naloxone (NARCAN) injection 0.2 mg, 0.2 mg, Intravenous, Q2 Min PRN **OR** naloxone (NARCAN) injection 0.4 mg, 0.4 mg, Intravenous, Q2 Min PRN **OR** naloxone (NARCAN) injection 0.2 mg, 0.2 mg, Intramuscular, Q2 Min PRN **OR** naloxone (NARCAN) injection 0.4 mg, 0.4 mg, Intramuscular, Q2 Min PRN, Alisha Christine MD     ondansetron (ZOFRAN-ODT) ODT tab 4 mg, 4 mg, Oral,  "Q6H PRN **OR** ondansetron (ZOFRAN) injection 4 mg, 4 mg, Intravenous, Q6H PRN, Govind, Alec, DO     oxybutynin ER (DITROPAN-XL) 24 hr tablet 10 mg, 10 mg, Oral, At Bedtime, Ale Faustin, DO, 10 mg at 03/09/22 2109     oxyCODONE (ROXICODONE) tablet 5 mg, 5 mg, Oral, 4x Daily PRN, Hayder Mishra MD, 5 mg at 03/10/22 0847     pantoprazole (PROTONIX) EC tablet 40 mg, 40 mg, Oral, QAM , Alisha Christine MD, 40 mg at 03/10/22 0847     predniSONE (DELTASONE) tablet 6 mg, 6 mg, Oral, Daily, Ale Faustin, DO, 6 mg at 03/10/22 0846     prochlorperazine (COMPAZINE) injection 5 mg, 5 mg, Intravenous, Q6H PRN **OR** prochlorperazine (COMPAZINE) tablet 5 mg, 5 mg, Oral, Q6H PRN **OR** prochlorperazine (COMPAZINE) suppository 12.5 mg, 12.5 mg, Rectal, Q12H PRN, Govind, Alec, DO     senna-docusate (SENOKOT-S/PERICOLACE) 8.6-50 MG per tablet 1 tablet, 1 tablet, Oral, BID PRN, 1 tablet at 03/08/22 2126 **OR** senna-docusate (SENOKOT-S/PERICOLACE) 8.6-50 MG per tablet 2 tablet, 2 tablet, Oral, BID PRN, Faust, Alec, DO     sodium chloride (PF) 0.9% PF flush 3 mL, 3 mL, Intracatheter, Q8H, Faust, Alec, DO, 3 mL at 03/08/22 1915     sodium chloride (PF) 0.9% PF flush 3 mL, 3 mL, Intracatheter, q1 min prn, Faust, Alec, DO, 3 mL at 03/08/22 2025     sodium chloride 0.9% infusion, , Intravenous, Continuous, Hayder Misrha MD, Last Rate: 75 mL/hr at 03/10/22 0306, New Bag at 03/10/22 0306     triamterene-HCTZ (MAXZIDE-25) 37.5-25 MG per tablet 1 tablet, 1 tablet, Oral, Daily, Alec Faust,      White Petrolatum GEL, , Topical, Q6H PRN, Hayder Mishra MD    Exam:  BP (!) 152/71 (BP Location: Left arm)   Pulse 91   Temp 98.8  F (37.1  C) (Oral)   Resp 18   Ht 1.651 m (5' 5\")   Wt 84.4 kg (186 lb)   LMP  (LMP Unknown)   SpO2 94%   BMI 30.95 kg/m      Expected Level: local       ASSESSMENT:  81 year old admitted with pneumonia, diabetes, peripheral neuropathy, cervical stenosis, and severe lumbar spinal stenosis. Ortho " requesting bilateral L5 transforaminal epidural steroid injection. Patient medically stable for procedure.      PLAN: Hold blood thinners after midnight   We will plan for bilateral L5 transforaminal ROSA 3/11  Wait for final blood cultures     The procedure its risks, benefits, and alternatives were discussed. Risks including, but not limited to pain, bleeding, infection, renal failure, contrast dye or medication reaction, or spinal injury were discussed with the patient's son via phone. Questions answered and the patient's son gives consent to proceed.    Nadya Chino, ANTONY, CNP  [phone]

## 2022-03-11 ENCOUNTER — APPOINTMENT (OUTPATIENT)
Dept: INTERVENTIONAL RADIOLOGY/VASCULAR | Facility: CLINIC | Age: 82
DRG: 871 | End: 2022-03-11
Attending: NURSE PRACTITIONER
Payer: MEDICARE

## 2022-03-11 LAB
ERYTHROCYTE [DISTWIDTH] IN BLOOD BY AUTOMATED COUNT: 16.8 % (ref 10–15)
GLUCOSE BLDC GLUCOMTR-MCNC: 176 MG/DL (ref 70–99)
GLUCOSE BLDC GLUCOMTR-MCNC: 179 MG/DL (ref 70–99)
GLUCOSE BLDC GLUCOMTR-MCNC: 449 MG/DL (ref 70–99)
HCT VFR BLD AUTO: 32.4 % (ref 35–47)
HGB BLD-MCNC: 8.7 G/DL (ref 11.7–15.7)
MCH RBC QN AUTO: 22.5 PG (ref 26.5–33)
MCHC RBC AUTO-ENTMCNC: 26.9 G/DL (ref 31.5–36.5)
MCV RBC AUTO: 84 FL (ref 78–100)
PLATELET # BLD AUTO: 269 10E3/UL (ref 150–450)
RBC # BLD AUTO: 3.86 10E6/UL (ref 3.8–5.2)
WBC # BLD AUTO: 8.9 10E3/UL (ref 4–11)

## 2022-03-11 PROCEDURE — 250N000013 HC RX MED GY IP 250 OP 250 PS 637: Performed by: HOSPITALIST

## 2022-03-11 PROCEDURE — 99152 MOD SED SAME PHYS/QHP 5/>YRS: CPT

## 2022-03-11 PROCEDURE — 250N000013 HC RX MED GY IP 250 OP 250 PS 637: Performed by: STUDENT IN AN ORGANIZED HEALTH CARE EDUCATION/TRAINING PROGRAM

## 2022-03-11 PROCEDURE — 36415 COLL VENOUS BLD VENIPUNCTURE: CPT | Performed by: HOSPITALIST

## 2022-03-11 PROCEDURE — 85014 HEMATOCRIT: CPT | Performed by: HOSPITALIST

## 2022-03-11 PROCEDURE — 250N000011 HC RX IP 250 OP 636: Performed by: RADIOLOGY

## 2022-03-11 PROCEDURE — 3E0R33Z INTRODUCTION OF ANTI-INFLAMMATORY INTO SPINAL CANAL, PERCUTANEOUS APPROACH: ICD-10-PCS | Performed by: RADIOLOGY

## 2022-03-11 PROCEDURE — 250N000011 HC RX IP 250 OP 636: Performed by: STUDENT IN AN ORGANIZED HEALTH CARE EDUCATION/TRAINING PROGRAM

## 2022-03-11 PROCEDURE — 250N000011 HC RX IP 250 OP 636: Performed by: HOSPITALIST

## 2022-03-11 PROCEDURE — 120N000001 HC R&B MED SURG/OB

## 2022-03-11 PROCEDURE — 99233 SBSQ HOSP IP/OBS HIGH 50: CPT | Performed by: HOSPITALIST

## 2022-03-11 PROCEDURE — 250N000013 HC RX MED GY IP 250 OP 250 PS 637: Performed by: INTERNAL MEDICINE

## 2022-03-11 PROCEDURE — 255N000002 HC RX 255 OP 636: Performed by: HOSPITALIST

## 2022-03-11 PROCEDURE — 250N000013 HC RX MED GY IP 250 OP 250 PS 637: Performed by: NURSE PRACTITIONER

## 2022-03-11 PROCEDURE — 250N000012 HC RX MED GY IP 250 OP 636 PS 637: Performed by: STUDENT IN AN ORGANIZED HEALTH CARE EDUCATION/TRAINING PROGRAM

## 2022-03-11 PROCEDURE — 250N000009 HC RX 250: Performed by: RADIOLOGY

## 2022-03-11 RX ORDER — NALOXONE HYDROCHLORIDE 0.4 MG/ML
0.2 INJECTION, SOLUTION INTRAMUSCULAR; INTRAVENOUS; SUBCUTANEOUS
Status: DISCONTINUED | OUTPATIENT
Start: 2022-03-11 | End: 2022-03-13 | Stop reason: HOSPADM

## 2022-03-11 RX ORDER — FENTANYL CITRATE 50 UG/ML
25-50 INJECTION, SOLUTION INTRAMUSCULAR; INTRAVENOUS EVERY 5 MIN PRN
Status: DISCONTINUED | OUTPATIENT
Start: 2022-03-11 | End: 2022-03-12

## 2022-03-11 RX ORDER — FLUMAZENIL 0.1 MG/ML
0.2 INJECTION, SOLUTION INTRAVENOUS
Status: DISCONTINUED | OUTPATIENT
Start: 2022-03-11 | End: 2022-03-12

## 2022-03-11 RX ORDER — AMLODIPINE BESYLATE 10 MG/1
10 TABLET ORAL DAILY
Status: DISCONTINUED | OUTPATIENT
Start: 2022-03-12 | End: 2022-03-13 | Stop reason: HOSPADM

## 2022-03-11 RX ORDER — AMLODIPINE BESYLATE 5 MG/1
5 TABLET ORAL DAILY
Status: DISCONTINUED | OUTPATIENT
Start: 2022-03-11 | End: 2022-03-11

## 2022-03-11 RX ORDER — DEXAMETHASONE SODIUM PHOSPHATE 10 MG/ML
20 INJECTION, SOLUTION INTRAMUSCULAR; INTRAVENOUS ONCE
Status: COMPLETED | OUTPATIENT
Start: 2022-03-11 | End: 2022-03-11

## 2022-03-11 RX ORDER — NALOXONE HYDROCHLORIDE 0.4 MG/ML
0.4 INJECTION, SOLUTION INTRAMUSCULAR; INTRAVENOUS; SUBCUTANEOUS
Status: DISCONTINUED | OUTPATIENT
Start: 2022-03-11 | End: 2022-03-13 | Stop reason: HOSPADM

## 2022-03-11 RX ADMIN — METHOCARBAMOL 500 MG: 500 TABLET ORAL at 15:55

## 2022-03-11 RX ADMIN — METHOCARBAMOL 500 MG: 500 TABLET ORAL at 20:09

## 2022-03-11 RX ADMIN — FENTANYL CITRATE 100 MCG: 50 INJECTION INTRAMUSCULAR; INTRAVENOUS at 15:00

## 2022-03-11 RX ADMIN — GABAPENTIN 200 MG: 100 CAPSULE ORAL at 09:05

## 2022-03-11 RX ADMIN — AZITHROMYCIN 250 MG: 250 TABLET, FILM COATED ORAL at 20:09

## 2022-03-11 RX ADMIN — METHOCARBAMOL 500 MG: 500 TABLET ORAL at 09:05

## 2022-03-11 RX ADMIN — INSULIN ASPART 5 UNITS: 100 INJECTION, SOLUTION INTRAVENOUS; SUBCUTANEOUS at 20:58

## 2022-03-11 RX ADMIN — ACETAMINOPHEN 650 MG: 325 TABLET ORAL at 09:05

## 2022-03-11 RX ADMIN — HYDROCORTISONE: 1 CREAM TOPICAL at 20:12

## 2022-03-11 RX ADMIN — HYDRALAZINE HYDROCHLORIDE 10 MG: 20 INJECTION INTRAMUSCULAR; INTRAVENOUS at 00:43

## 2022-03-11 RX ADMIN — HYDROCORTISONE: 1 CREAM TOPICAL at 11:40

## 2022-03-11 RX ADMIN — LAMOTRIGINE 100 MG: 100 TABLET ORAL at 09:06

## 2022-03-11 RX ADMIN — IOHEXOL 20 ML: 180 INJECTION INTRAVENOUS at 15:00

## 2022-03-11 RX ADMIN — LAMOTRIGINE 100 MG: 100 TABLET ORAL at 20:09

## 2022-03-11 RX ADMIN — GABAPENTIN 200 MG: 100 CAPSULE ORAL at 20:09

## 2022-03-11 RX ADMIN — ENOXAPARIN SODIUM 40 MG: 100 INJECTION SUBCUTANEOUS at 15:59

## 2022-03-11 RX ADMIN — ATORVASTATIN CALCIUM 10 MG: 10 TABLET, FILM COATED ORAL at 20:09

## 2022-03-11 RX ADMIN — DULOXETINE HYDROCHLORIDE 60 MG: 60 CAPSULE, DELAYED RELEASE PELLETS ORAL at 09:05

## 2022-03-11 RX ADMIN — TRIAMTERENE AND HYDROCHLOROTHIAZIDE 1 TABLET: 37.5; 25 TABLET ORAL at 09:05

## 2022-03-11 RX ADMIN — CEFTRIAXONE SODIUM 2 G: 2 INJECTION, POWDER, FOR SOLUTION INTRAMUSCULAR; INTRAVENOUS at 19:28

## 2022-03-11 RX ADMIN — MIDAZOLAM HYDROCHLORIDE 2 MG: 1 INJECTION, SOLUTION INTRAMUSCULAR; INTRAVENOUS at 14:59

## 2022-03-11 RX ADMIN — PREDNISONE 6 MG: 1 TABLET ORAL at 09:05

## 2022-03-11 RX ADMIN — ACETAMINOPHEN 650 MG: 325 TABLET ORAL at 15:56

## 2022-03-11 RX ADMIN — PANTOPRAZOLE SODIUM 40 MG: 20 TABLET, DELAYED RELEASE ORAL at 09:05

## 2022-03-11 RX ADMIN — OXYBUTYNIN CHLORIDE 10 MG: 5 TABLET, FILM COATED, EXTENDED RELEASE ORAL at 20:09

## 2022-03-11 RX ADMIN — OXYCODONE HYDROCHLORIDE 5 MG: 5 TABLET ORAL at 03:51

## 2022-03-11 RX ADMIN — AMLODIPINE BESYLATE 5 MG: 5 TABLET ORAL at 09:04

## 2022-03-11 RX ADMIN — LIDOCAINE: 50 OINTMENT TOPICAL at 09:21

## 2022-03-11 RX ADMIN — Medication 1 MG: at 00:36

## 2022-03-11 RX ADMIN — DEXAMETHASONE SODIUM PHOSPHATE 20 MG: 10 INJECTION, SOLUTION INTRAMUSCULAR; INTRAVENOUS at 15:13

## 2022-03-11 RX ADMIN — ACETAMINOPHEN 650 MG: 325 TABLET ORAL at 20:09

## 2022-03-11 RX ADMIN — LIDOCAINE HYDROCHLORIDE 20 ML: 10 INJECTION, SOLUTION EPIDURAL; INFILTRATION; INTRACAUDAL; PERINEURAL at 15:13

## 2022-03-11 RX ADMIN — LIDOCAINE: 50 OINTMENT TOPICAL at 20:14

## 2022-03-11 ASSESSMENT — ACTIVITIES OF DAILY LIVING (ADL)
ADLS_ACUITY_SCORE: 13

## 2022-03-11 NOTE — PROVIDER NOTIFICATION
Paged MD about pt's rash on right arm, PRN blood pressure medications, and if pt needs to be on IV fluids anymore.

## 2022-03-11 NOTE — PROCEDURES
Neurointerventional Surgery  Post-procedure     Patient Name: Tonya Yang  MRN: [id number]  Date of Procedure: March 11, 2022    Procedure: Bilateral L5-S1 transforaminal epidural steroid injection   Radiologist: Matheus Dai MD    Contrast: 2 ml Omni 180  Fluoro Time: 1.7 minutes  Air Kerma: 268 mGy    Medications:  Versed 2 mg IV                        Fentanyl 100 mcg IV      Sedation Time: 10 minutes    EBL: none   Complications: none    Patient reevaluated immediately prior to sedation and prior to procedure.    Preliminary Report:   (See dictation for full detail)  Technically successful bilateral L5-S1 transforaminal epidural steroid injection.    Assess/Plan:  Routine post procedure monitoring   Bedrest x 2 hours       Matheus Dai MD MD  Emergency pager: [phone]  Office: [phone]

## 2022-03-11 NOTE — PLAN OF CARE
Speech Language Therapy Discharge Summary    Reason for therapy discharge:    All goals and outcomes met, no further needs identified.    Progress towards therapy goal(s). See goals on Care Plan in [facility] electronic health record for goal details.  Goals met    Therapy recommendation(s):    No further therapy is recommended.  Patient tolerating diet of Regular textures and thin liquids without signs or symptoms of aspiration. Video swallow study on 3/9/22 showed patient's swallow function to be WNL for her age group. Please refer to Speech Therapy progress notes and daily flowsheets for any additional information.

## 2022-03-11 NOTE — PLAN OF CARE
Problem: Plan of Care - These are the overarching goals to be used throughout the patient stay.    Goal: Absence of Hospital-Acquired Illness or Injury  Intervention: Identify and Manage Fall Risk  Recent Flowsheet Documentation  Taken 3/11/2022 0030 by Rachel Cheatham RN  Safety Promotion/Fall Prevention:   activity supervised   assistive device/personal items within reach   bed alarm on   clutter free environment maintained   increase visualization of patient   lighting adjusted   mobility aid in reach   nonskid shoes/slippers when out of bed   room door open   room near nurse's station   room organization consistent   supervised activity  Goal: Optimal Comfort and Wellbeing  Intervention: Monitor Pain and Promote Comfort  Recent Flowsheet Documentation  Taken 3/11/2022 0351 by Rachel Cheatham RN  Pain Management Interventions: medication (see MAR)  Taken 3/11/2022 0043 by Rachel Cheatham RN  Pain Management Interventions:   repositioned   rest   quiet environment facilitated     Problem: Pain Chronic (Persistent)  Goal: Acceptable Pain Control and Functional Ability  Intervention: Develop Pain Management Plan  Recent Flowsheet Documentation  Taken 3/11/2022 0351 by Rachel Cheatham RN  Pain Management Interventions: medication (see MAR)  Taken 3/11/2022 0043 by Rachel Cheatham RN  Pain Management Interventions:   repositioned   rest   quiet environment facilitated  Intervention: Manage Persistent Pain  Recent Flowsheet Documentation  Taken 3/11/2022 0030 by Rachel Cheatham RN  Medication Review/Management: medications reviewed     Problem: Respiratory Compromise (Pneumonia)  Goal: Effective Oxygenation and Ventilation  Intervention: Promote Airway Secretion Clearance  Recent Flowsheet Documentation  Taken 3/11/2022 0030 by Rachel Cheatham RN  Cough And Deep Breathing: done independently per patient    Back pain managed with PRN oxycodone. Hydrocortisone cream and ice packs utilized for red, itchy rash in  right AC. Pivot to bedside commode Ax1. Behavior anxious and hyperverbal but appropriate. Rested with eyes closed intermittently. NPO for lumbar/sacral injection in IR today.

## 2022-03-11 NOTE — PROGRESS NOTES
Patient Name: Tonya Yang  Medical Record Number: [id number]  Today's Date: 3/11/2022    Procedure: Image-guided L5 Transforminal epidural steroid injection  Proceduralist: Dr. Dai  Pathology present: n/a    Procedure Start: 1444  Procedure end: 1454  Sedation medications administered: Fentanyl 100mcg, versed 2mg     Report given to: CHERELLE Griffin  : n/a    Other Notes: Pt arrived to IR room 1 from 206. Consent reviewed. Pt denies any questions or concerns regarding procedure. Pt positioned prone and monitored per protocol. Pt tolerated procedure without any noted complications. Pt transferred back to 206.    Janis Fletcher RN  03/11/22  3:04 PM

## 2022-03-11 NOTE — PLAN OF CARE
Problem: Plan of Care - These are the overarching goals to be used throughout the patient stay.    Goal: Optimal Comfort and Wellbeing  Outcome: Ongoing, Not Progressing    Problem: Plan of Care - These are the overarching goals to be used throughout the patient stay.    Goal: Absence of Hospital-Acquired Illness or Injury  Intervention: Prevent Skin Injury  Recent Flowsheet Documentation  Taken 3/10/2022 1630 by Oma Cyr RN  Body Position:    position changed independently    weight shifting    supine, head elevated     Pt A&Ox4. Pt complains of pain in back. Scheduled and PRN pain medications given. Pt described having more functional mobility after pain medication. Pt was having a difficult time coping with pt's illness throughout shift. Pt was very anxious and requesting multiple things at once from the RN. RN attempted to calm the patient down. Pt is having a lumbar sacral transfor injection under IR tomorrow. Hold lovenox after midnight.

## 2022-03-11 NOTE — PROVIDER NOTIFICATION
"To Dr. Mishra --    \"206 ([facility]) - pt scheduled for IR procedure this afternoon, thoughts on NPO status prior?\"  "

## 2022-03-11 NOTE — PROGRESS NOTES
[facility] ACUTE PAIN SERVICE    ([facility], [facility], [facility])   Daily PAIN Progress Note    Assessment/Plan:  Tonya Yang is a 81 year old female who was admitted on 3/7/2022.  Pain team was asked to see the patient for acute on chronic pain. Admitted for back and leg pain, pneumonia of right lower lobe, sepsis, weakness of right lower extremity. Patient reporting cough x 1 week. History including DM2,  chronic low back pain radiating into RLE, hearing loss, anxiety, bipolar 1 disorder, chronic pain asthma, COPD, GERD, Meniere's disease,  HTN, hyperlipidemia, CKD, depression, IBS, neuropathy. Spine Surgery consulted.    Patient is followed by [facility], she is schedule for 2 epidural injections, and she reports minimal relief with previous injection. It appears they talked to her about Pain Pump, but she is worried about infection.      Opioid Induced Respiratory Depression Risk Assessment:?  High: age, concomitant CNS depressants, COPD, asthma, CKD.    NPO for lumbar/sacral injection in IR today.    1) Pain is consistent with worsening of chronic pain.   2)Multimodal Medication Therapy  Topical: Lidocaine ointment 5% qid, discontinue patches  NSAID'S: CrCl 46 ml/min, No, CKD3, patient also on prednisone 6 mg daily  Muscle Relaxants: diazepam for anxiety - reports she takes 1-2x prn at home, not daily, only for severe anxiety.  Robaxin QID   Adjuvants: Tylenol 650 mg po tid, gabapentin 200 mg po bid  Antidepressants/anxiolytics: Cymbalta 60 mg daily, diazepam 0.5 mg bid prn for anxiety(home med)  Opioids:  Oxycodone 5 mg po qid prn - do not recommend increase in opioids or change to a different opioid medication for chronic pain. Opioids offer more risk than benefit here for chronic pain as patient is high risk for opioid induced respiratory depression due to risk factors as mentioned above.   IV Pain medication:none  3)Non-medication interventions: ice, heat, rest, PT,  OT  Acupuncture consult, Integrative consult -patient would like   4)Constipation Prophylaxis: senna/docusate 1-2 po bid prn     5) Care Teams: [facility], Neurosurgery  -Opioid prescriber has been Ananya MO  pulled from system on 03/08/22. This indicates current opioid use.  02/08/22 Percocet 5/325 #120  01/21/22 gabapentin 100 mg #360(90 day supply)  01/13/22 Percocet 5/325 #100  01/06/22 diazepam 2 mg #10  Discharge Recommendations - We recommend prescribing the following at the time of discharge: return to using Percocet as she had been before admission. Would recommend taper off chronically, as opioids would not be indicated for this chronic pain, will refer her to home pain provider to discuss this.   Consider robaxin and topicals if effective. Follow up Primary Care Provider.      Subjective:  Patient is sitting calmly in chair, did just receive integrative touch therapy. Planning on ROSA today. She feels pain is the same. Will follow up visit after injection.    Lorraine Smith, PharmD  Acute Care Pain Management Program  [facility] ([place], [place], [place])   With questions call [phone]  Preference if for [facility]

## 2022-03-11 NOTE — PROGRESS NOTES
SPIRITUAL HEALTH SERVICES Progress Note  [facility]    Follow-up visit per pt request. She reported being in much better spirits today and asserted she is feeling cautiously hopeful about the plan moving forward, including the prospect of being in TCU at [facility]. Provided prayer before her procedure today. No plans to follow given her hopes to discharge over the weekend.     Wilian Brink M.Div.  Staff

## 2022-03-11 NOTE — PLAN OF CARE
Problem: Plan of Care - These are the overarching goals to be used throughout the patient stay.    Goal: Optimal Comfort and Wellbeing  Outcome: Ongoing, Progressing  Intervention: Monitor Pain and Promote Comfort  Recent Flowsheet Documentation  Taken 3/11/2022 0950 by Gaby Contreras RN  Pain Management Interventions: (Patient in with alternative therapy) other (see comments)  Taken 3/11/2022 0905 by Gaby Contreras RN  Pain Management Interventions: medication (see MAR)  Goal: Readiness for Transition of Care  Outcome: Ongoing, Progressing     Problem: Risk for Delirium  Goal: Improved Sleep  Outcome: Ongoing, Progressing     Problem: Infection (Pneumonia)  Goal: Resolution of Infection Signs and Symptoms  Outcome: Ongoing, Progressing     Problem: Respiratory Compromise (Pneumonia)  Goal: Effective Oxygenation and Ventilation  Outcome: Ongoing, Progressing  Intervention: Promote Airway Secretion Clearance  Recent Flowsheet Documentation  Taken 3/11/2022 0905 by Gaby Contreras RN  Cough And Deep Breathing: done with encouragement  Intervention: Optimize Oxygenation and Ventilation  Recent Flowsheet Documentation  Taken 3/11/2022 1220 by Gaby Contreras RN  Head of Bed (HOB) Positioning: HOB at 20-30 degrees  Taken 3/11/2022 0905 by Gaby Contreras RN  Head of Bed (HOB) Positioning: HOB at 20-30 degrees    Goal Outcome Evaluation:      Patient is A&Ox4 and is currently in IR for an injection. Patient has been experiencing pain when moving/transferring, and was provided scheduled and PRN medication to intervene. Patient has been able to ambulate more around her room, and sit in her chair throughout the shift. Patient has been NPO since 0745 with the exception of sips of water for medications, in preparation for her IR procedure. Patient has been in better spirits when staff have been able to slow down and speak to her with clear masks or with their mask down (so she can lip-read). Plan  of care to continue today per providers and care teams, with discharge to TCU possible tomorrow pending continued improvement and care management.    Gaby Contreras RN

## 2022-03-11 NOTE — PROGRESS NOTES
Hospitalist Progress Note    Assessment/Plan    Principal Problem:    Pneumonia of right lower lobe due to infectious organism  Active Problems:    Bilateral back pain    Severe sepsis (H)    Type 2 diabetes mellitus with hyperglycemia (H)    Glycosuria            81-year-old patient with significant past medical history including hearing difficulty, diabetes, chronic back pain, with progressive spinal stenosis, anxiety, GERD, overactive bladder, presented to the hospital for evaluation of acute on chronic lower back pain, she was found to have severe sepsis with right middle lobe pneumonia     Severe sepsis resolving  Secondary to community-acquired pneumonia,  In the ER she was tachycardic, had leukocytosis on the blood work, chest x-ray showed medial right lower lobe consolidation, CT chest on March 10 showed evidence of pneumonia, currently on IV Rocephin and Zithromax will consider switching to p.o. antibiotic tomorrow  Blood culture obtained, continue to be negative  Swallow study ordered,  no evidence of aspiration        Acute on chronic back pain with polyarthralgia,  Per the patient it became worse over last month previously she had gait assistance with cane but now bedbound due to weakness  , MRI of cervical lumbar and thoracic spine  concerning for severe bilateral neural foraminal stenosis and compression of bilateral exiting cauda equina nerve roots left greater than right  -Spine surgery consulted had extensive discussion with the patient and her son regarding her current condition and prognosis,   also discussed with him about risks and benefits of doing surgery and not having surgery as well, they were more interested in palliative care approach,  Spine surgery recommended to continue treatment of her pneumonia and reassess the need for surgery and the risk and benefits associated with it once her pneumonia clears  -Pain management team consulted, palliative care consulted and signed off  -Planning  to have IR epidural steroid injection today for pain control, discussed with the patient and her son at bedside      incidental soft tissue finding  On MRI thoracic spine there is small well circumscribed enhancing lesion along the left anterolateral paravertebral soft tissue measure 1.2 x 1.1 x 1.4 cm  Nonspecific finding could represent neurofibroma/schwannoma, nerve sheath tumor, metastatic lymph node or atypical metastatic mass, radiologist recommended CT chest for further assessment   -Neurosurgery consulted, CT chest ordered March 10 showed an mass in the left anterior paravertebral fat posterior to the descending thoracic aorta,  Most likely represent lymph node, recommend 6-month follow-up CT   neurosurgery signed off  -I updated the son about this finding and the follow-up CT recommended in 6 months        Type 2 diabetes  Last year she had A1c checked it was 10  A1c 8.8,  Currently on insulin while inpatient, holding Metformin Jardiance and glipizide        Hypertension  On triamterene hydrochlorothiazide for Ménière's disease and hypertension  On amlodipine  Still elevated blood pressure, will increase the dose of amlodipine at this time   as needed hydralazine ordered,         Ménière disease  On triamterene hydrochlorothiazide, prednisone, resumed     Bipolar disorder  Anxiety  Continue duloxetine, Lamictal, as needed Ativan        Overactive bladder  Resume oxybutynin     GERD   patient states she had  Occasional food stuck in her esophagus, on pantoprazole, will have a swallow study today     Goals of care discussion  Palliative care consulted, discussed with the patient, and her son, family not interested in hospice,    Barriers to Discharge: Pain control, IV antibiotics    Anticipated discharge date/Disposition: TCU in 1 to 2 days    Subjective  Patient was seen today appears more calm  son at bedside, she and her son had questions about epidural injections and expectations, discussed with him the  plan for discharge tomorrow if she improves    Objective    Vital signs in last 24 hours  Temp:  [97.6  F (36.4  C)-98.1  F (36.7  C)] 97.6  F (36.4  C)  Pulse:  [76-83] 80  Resp:  [16-17] 16  BP: (177-182)/(79-87) 179/79  SpO2:  [92 %-95 %] 92 % [unfilled] O2 Device: None (Room air)    Weight:   [unfilled] Weight change:     Intake/Output last 3 shifts  I/O last 3 completed shifts:  In: 480 [P.O.:480]  Out: 2450 [Urine:2450]  Body mass index is 35.45 kg/m .    Physical Exam:  General Appearance: Alert, oriented x3, does not appear in distress.  HEENT: Normocephalic. No scleral icterus, . Mucous membranes moist.  Heart: :Regular rate and rhythm, normal S1 ,S2, No murmurs, no JVD, no pedal edema   Lungs: Clear to auscultation bilaterally. No wheezing or crackles  Abdomen: Soft, non tender, no rebound or rigidity, non distended, bowel sounds present.  Extremity: No deformity. No joint swelling.      Pertinent Labs   Lab Results: personally reviewed.   Recent Labs   Lab 03/07/22  1354      CO2 25   BUN 23     Recent Labs   Lab 03/11/22  0510 03/08/22  0626 03/07/22  1354   WBC 8.9 19.5* 26.2*   HGB 8.7* 8.5* 10.4*   HCT 32.4* 31.1* 37.8    272 326     No results for input(s): CKTOTAL, TROPONINI in the last 168 hours.    Invalid input(s): TROPONINT, CKMBINDEX  Invalid input(s): POCGLUFGR        Advanced Care Planning:  Discharge Planning discussed with patient and her son at bedside  Total time with this patient is 35 min with 50% of time spent in examining the patient, reviewing records, discussing plan of care and counseling, 50% of time spent in coordination of care.  Care discussed and coordinated with CHERELLE.      Hayder Mishra MD  Internal Medicine Hospitalist  3/11/2022

## 2022-03-11 NOTE — PROGRESS NOTES
Care Management Follow Up    Length of Stay (days): 4        Patient plan of care discussed at interdisciplinary rounds: Yes     Expected Discharge Date:   3/12/2022 pending       Concerns to be Addressed / Barriers to Discharge: medical management, pain management, steroid injection today     Anticipated Discharge Disposition: TCU    Anticipated Discharge Services:      Anticipated Discharge DME:      Patient/family educated on Medicare website which has current facility and service quality ratings:  Previously done    Education Provided on the Discharge Plan:   yes  Patient/Family in Agreement with the Plan:  yes}    Referrals Placed by CM/SW: previously done  Private pay costs discussed: transportation costs - patient's sonDavie declined. Plans to transport himself after 1400 Sat 3/12.     Additional Information:  3/11/2022 Per Hospitalist, not medically ready for discharge; potentially tomorrow pending pain management. Received messages from [facility] and [facility] re; available TCU bed.     Spoke with patient's son, Davie- he would prefer [facility] which is within a mile of their home. He would be available to give his mom a ride to TCU after 1400.     [facility] She is working until 1400 tomorrow 3/12 so as long as she receives confirmation of discharge and orders by 1200, would be able to accept at anytime after 1400 (when son available to give ride).     Will need PAS.     Called and updated Mike with [facility] re: plan to go to [facility] closer to son's home.

## 2022-03-12 ENCOUNTER — APPOINTMENT (OUTPATIENT)
Dept: PHYSICAL THERAPY | Facility: CLINIC | Age: 82
DRG: 871 | End: 2022-03-12
Payer: MEDICARE

## 2022-03-12 LAB
BACTERIA BLD CULT: NO GROWTH
BACTERIA BLD CULT: NO GROWTH
GLUCOSE BLDC GLUCOMTR-MCNC: 182 MG/DL (ref 70–99)
GLUCOSE BLDC GLUCOMTR-MCNC: 210 MG/DL (ref 70–99)
GLUCOSE BLDC GLUCOMTR-MCNC: 257 MG/DL (ref 70–99)
GLUCOSE BLDC GLUCOMTR-MCNC: 261 MG/DL (ref 70–99)
GLUCOSE BLDC GLUCOMTR-MCNC: 395 MG/DL (ref 70–99)
GLUCOSE BLDC GLUCOMTR-MCNC: 412 MG/DL (ref 70–99)

## 2022-03-12 PROCEDURE — 99232 SBSQ HOSP IP/OBS MODERATE 35: CPT | Performed by: NURSE PRACTITIONER

## 2022-03-12 PROCEDURE — 120N000001 HC R&B MED SURG/OB

## 2022-03-12 PROCEDURE — 250N000013 HC RX MED GY IP 250 OP 250 PS 637: Performed by: STUDENT IN AN ORGANIZED HEALTH CARE EDUCATION/TRAINING PROGRAM

## 2022-03-12 PROCEDURE — 250N000012 HC RX MED GY IP 250 OP 636 PS 637: Performed by: STUDENT IN AN ORGANIZED HEALTH CARE EDUCATION/TRAINING PROGRAM

## 2022-03-12 PROCEDURE — 250N000013 HC RX MED GY IP 250 OP 250 PS 637: Performed by: NURSE PRACTITIONER

## 2022-03-12 PROCEDURE — 97116 GAIT TRAINING THERAPY: CPT | Mod: GP

## 2022-03-12 PROCEDURE — 250N000011 HC RX IP 250 OP 636: Performed by: HOSPITALIST

## 2022-03-12 PROCEDURE — 250N000013 HC RX MED GY IP 250 OP 250 PS 637: Performed by: HOSPITALIST

## 2022-03-12 PROCEDURE — 250N000012 HC RX MED GY IP 250 OP 636 PS 637: Performed by: HOSPITALIST

## 2022-03-12 PROCEDURE — 99233 SBSQ HOSP IP/OBS HIGH 50: CPT | Performed by: HOSPITALIST

## 2022-03-12 PROCEDURE — 250N000013 HC RX MED GY IP 250 OP 250 PS 637: Performed by: INTERNAL MEDICINE

## 2022-03-12 RX ORDER — LIDOCAINE 40 MG/G
CREAM TOPICAL 4 TIMES DAILY PRN
COMMUNITY
Start: 2022-03-12 | End: 2022-04-07

## 2022-03-12 RX ORDER — GLIPIZIDE 10 MG/1
10 TABLET ORAL
Start: 2022-03-12 | End: 2023-03-29

## 2022-03-12 RX ORDER — OXYCODONE AND ACETAMINOPHEN 5; 325 MG/1; MG/1
1 TABLET ORAL EVERY 4 HOURS PRN
Qty: 2 TABLET | Refills: 0 | Status: SHIPPED | OUTPATIENT
Start: 2022-03-12 | End: 2022-03-14

## 2022-03-12 RX ORDER — METHOCARBAMOL 500 MG/1
500 TABLET, FILM COATED ORAL 4 TIMES DAILY
Qty: 56 TABLET | Refills: 0 | Status: SHIPPED | OUTPATIENT
Start: 2022-03-12 | End: 2022-03-12

## 2022-03-12 RX ORDER — METHOCARBAMOL 500 MG/1
500 TABLET, FILM COATED ORAL 4 TIMES DAILY
Qty: 56 TABLET | Refills: 0 | Status: SHIPPED | OUTPATIENT
Start: 2022-03-12 | End: 2022-03-30

## 2022-03-12 RX ORDER — AMLODIPINE BESYLATE 10 MG/1
10 TABLET ORAL DAILY
Start: 2022-03-13 | End: 2022-04-14

## 2022-03-12 RX ORDER — CARVEDILOL 3.12 MG/1
3.12 TABLET ORAL 2 TIMES DAILY WITH MEALS
Start: 2022-03-12 | End: 2022-05-26

## 2022-03-12 RX ORDER — GABAPENTIN 100 MG/1
200 CAPSULE ORAL 2 TIMES DAILY
Qty: 2 CAPSULE | Refills: 0 | Status: ON HOLD | OUTPATIENT
Start: 2022-03-12 | End: 2022-04-14

## 2022-03-12 RX ORDER — ACETAMINOPHEN 325 MG/1
325-650 TABLET ORAL EVERY 6 HOURS PRN
COMMUNITY
Start: 2022-03-12 | End: 2022-08-09

## 2022-03-12 RX ORDER — GUAIFENESIN/DEXTROMETHORPHAN 100-10MG/5
10 SYRUP ORAL EVERY 4 HOURS PRN
Qty: 118 ML | Refills: 0 | Status: ON HOLD | OUTPATIENT
Start: 2022-03-12 | End: 2022-04-14

## 2022-03-12 RX ORDER — CARVEDILOL 3.12 MG/1
3.12 TABLET ORAL 2 TIMES DAILY WITH MEALS
Status: DISCONTINUED | OUTPATIENT
Start: 2022-03-12 | End: 2022-03-13 | Stop reason: HOSPADM

## 2022-03-12 RX ADMIN — SENNOSIDES AND DOCUSATE SODIUM 1 TABLET: 50; 8.6 TABLET ORAL at 09:30

## 2022-03-12 RX ADMIN — METHOCARBAMOL 500 MG: 500 TABLET ORAL at 08:23

## 2022-03-12 RX ADMIN — CARVEDILOL 3.12 MG: 3.12 TABLET, FILM COATED ORAL at 16:21

## 2022-03-12 RX ADMIN — CARVEDILOL 3.12 MG: 3.12 TABLET, FILM COATED ORAL at 10:07

## 2022-03-12 RX ADMIN — PANTOPRAZOLE SODIUM 40 MG: 20 TABLET, DELAYED RELEASE ORAL at 08:23

## 2022-03-12 RX ADMIN — METHOCARBAMOL 500 MG: 500 TABLET ORAL at 12:14

## 2022-03-12 RX ADMIN — METHOCARBAMOL 500 MG: 500 TABLET ORAL at 16:21

## 2022-03-12 RX ADMIN — DULOXETINE HYDROCHLORIDE 60 MG: 60 CAPSULE, DELAYED RELEASE PELLETS ORAL at 08:23

## 2022-03-12 RX ADMIN — HYDROCORTISONE: 1 CREAM TOPICAL at 02:11

## 2022-03-12 RX ADMIN — OXYCODONE HYDROCHLORIDE 5 MG: 5 TABLET ORAL at 14:08

## 2022-03-12 RX ADMIN — DIAZEPAM 1 MG: 2 TABLET ORAL at 03:27

## 2022-03-12 RX ADMIN — GABAPENTIN 200 MG: 100 CAPSULE ORAL at 20:33

## 2022-03-12 RX ADMIN — INSULIN GLARGINE 6 UNITS: 100 INJECTION, SOLUTION SUBCUTANEOUS at 10:08

## 2022-03-12 RX ADMIN — PREDNISONE 6 MG: 1 TABLET ORAL at 08:24

## 2022-03-12 RX ADMIN — OXYCODONE HYDROCHLORIDE 5 MG: 5 TABLET ORAL at 03:27

## 2022-03-12 RX ADMIN — LIDOCAINE: 50 OINTMENT TOPICAL at 16:21

## 2022-03-12 RX ADMIN — LIDOCAINE: 50 OINTMENT TOPICAL at 20:33

## 2022-03-12 RX ADMIN — ACETAMINOPHEN 650 MG: 325 TABLET ORAL at 14:08

## 2022-03-12 RX ADMIN — INSULIN ASPART 1 UNITS: 100 INJECTION, SOLUTION INTRAVENOUS; SUBCUTANEOUS at 20:52

## 2022-03-12 RX ADMIN — AMOXICILLIN AND CLAVULANATE POTASSIUM 1 TABLET: 875; 125 TABLET, FILM COATED ORAL at 19:33

## 2022-03-12 RX ADMIN — ACETAMINOPHEN 650 MG: 325 TABLET ORAL at 20:33

## 2022-03-12 RX ADMIN — AMLODIPINE BESYLATE 10 MG: 10 TABLET ORAL at 08:23

## 2022-03-12 RX ADMIN — ACETAMINOPHEN 650 MG: 325 TABLET ORAL at 08:23

## 2022-03-12 RX ADMIN — LAMOTRIGINE 100 MG: 100 TABLET ORAL at 20:33

## 2022-03-12 RX ADMIN — LIDOCAINE: 50 OINTMENT TOPICAL at 08:24

## 2022-03-12 RX ADMIN — ENOXAPARIN SODIUM 40 MG: 100 INJECTION SUBCUTANEOUS at 15:38

## 2022-03-12 RX ADMIN — LIDOCAINE: 50 OINTMENT TOPICAL at 12:14

## 2022-03-12 RX ADMIN — DIAZEPAM 1 MG: 2 TABLET ORAL at 15:34

## 2022-03-12 RX ADMIN — ATORVASTATIN CALCIUM 10 MG: 10 TABLET, FILM COATED ORAL at 20:33

## 2022-03-12 RX ADMIN — TRIAMTERENE AND HYDROCHLOROTHIAZIDE 1 TABLET: 37.5; 25 TABLET ORAL at 08:24

## 2022-03-12 RX ADMIN — GABAPENTIN 200 MG: 100 CAPSULE ORAL at 08:23

## 2022-03-12 RX ADMIN — OXYBUTYNIN CHLORIDE 10 MG: 5 TABLET, FILM COATED, EXTENDED RELEASE ORAL at 20:33

## 2022-03-12 RX ADMIN — METHOCARBAMOL 500 MG: 500 TABLET ORAL at 20:32

## 2022-03-12 RX ADMIN — LAMOTRIGINE 100 MG: 100 TABLET ORAL at 08:23

## 2022-03-12 RX ADMIN — OXYCODONE HYDROCHLORIDE 5 MG: 5 TABLET ORAL at 09:30

## 2022-03-12 ASSESSMENT — ACTIVITIES OF DAILY LIVING (ADL)
ADLS_ACUITY_SCORE: 13
ADLS_ACUITY_SCORE: 11
ADLS_ACUITY_SCORE: 13
ADLS_ACUITY_SCORE: 13
ADLS_ACUITY_SCORE: 11
ADLS_ACUITY_SCORE: 13
ADLS_ACUITY_SCORE: 11
ADLS_ACUITY_SCORE: 11
ADLS_ACUITY_SCORE: 13
ADLS_ACUITY_SCORE: 11
ADLS_ACUITY_SCORE: 13
ADLS_ACUITY_SCORE: 11

## 2022-03-12 NOTE — PROGRESS NOTES
Care Management Follow Up    Length of Stay (days): 5    Expected Discharge Date: 3/13/2022     Concerns to be Addressed: MD orders, placement        Patient plan of care discussed at interdisciplinary rounds: Yes    Anticipated Discharge Disposition:  ([facility] TCU)     Anticipated Discharge Services:  (TCU)  Anticipated Discharge DME: None    Patient/family educated on Medicare website which has current facility and service quality ratings: yes    Education Provided on the Discharge Plan: CM met with patient. AVS per bedside RN.      Patient/Family in Agreement with the Plan:  yes        Additional Information:  Chart reviewed. CM met with patient. CM spoke with jonas Broderick via phone. MD placed orders. CM coordinated with TCU, HUC, charge RN, bedside RN, MD, patient and family. TCU will not accept patient today because they did not have orders by noon. [facility] admissions states they need orders by 10 am tomorrow morning and will take the patient at noon tomorrow. Bedside RN, ANGELIQUE, charge RN patient and family aware. CM will update MD. Plan for discharge tomorrow (3/13/22).     Jonas Broderick has bee updated that the discharge will be tomorrow. Facility prefers a noon pickup time. 12:56 PM     CM called jonas Broderick to update him regarding the change. He did not answer. CM left brief message to return call. 12:47 PM      [facility] admissions called- will not take patient today due to not having the orders by noon today. Need orders by 10 am tomorrow and will accept patient at noon tomorrow. 12:46 PM     Paged Dr Mishra for D/C orders if patient will discharge today. 8:43 AM     [facility]  - CM called to confirm acceptance. Need orders by noon. Ride will transport about 2 pm. 8:41 AM      CM completed PAS- [id number]  12:56 PM 03/12/22    Daniella Mcallister, CHERELLE

## 2022-03-12 NOTE — DISCHARGE INSTRUCTIONS
Pain Team Instructions   - Robaxin was prescribed for muscle spasms.   - Aspercreme (Lidocaine) ointment may be used 4x daily for pain, apply to painful areas. Avoid red or open skin. Avoid heat over lidocaine. This my be purchased over the counter without a prescription.   - You have Percocet at home prescribed by your Primary Care Provider.   - Acetaminophen (Tylenol) may be used as needed for pain. Do not exceed more than 3000 mg of Tylenol per day in a 24 hour period. Many prescription pain medications contain Tylenol  (acetaminophen), including Vicodin , Tylenol #3 , Norco , Lortab , and Percocet .  You should use caution if you take any extra pills of Tylenol  if you are using these prescription medications. Do not take more than 3000 mg of Tylenol per day or you can get very sick.    - Store your medications in a safe and secure place.   - Only take medications prescribed to you and only take them as directed by your doctor. Taking more than the prescribed amount increases your risk for respiratory depression or death.  - Dispose of any unused medications in your home.  Over-the-counter medications and prescription drugs can pollute [place] and be harmful to humans, fish, and other wildlife when disposed of improperly -- do not flush medications down the toilet or place in the trash.  Properly disposing of medicines is important to prevent abuse or poisoning and protect the environment. Prescription and over-the-counter medications are collected anonymously from residents for free at [facility] drop-off locations usually located at your local police department.   - Opioid pain medications can cause addiction. If you have a history of chemical dependency of any type, you are at a higher risk of becoming addicted to pain medications.  Only take these prescribed medications to treat your pain when all other options have been tried. Take it for as short a time and as few doses as possible. Store your pain pills in a  secure place, as they are frequently stolen and provide a dangerous opportunity for children or visitors in your house to start abusing these powerful medications. We will not replace any lost or stolen medicine.   - As soon as your pain is better, you should seek out a drug take back program (see your local police department) to dispose of your extra medication.   - You have been given a prescription for an opioid (narcotic) pain medicine and/or have received a pain medicine. These medicines can make you drowsy or impaired. You must not drive, operate dangerous equipment, or engage in any other dangerous activities such as drinking alcohol or using illicit drugs while taking these medications. If you drive while taking these medications, you could be arrested for DUI, or driving under the influence. Do not drink any alcohol while you are taking these medications.   - Opioids can have side effects of nausea, vomiting, constipation. Take your medication with food.   - All opioids tend to cause constipation. Drink plenty of water and eat foods that have a lot of fiber, such as fruits, vegetables, prune juice, apple juice and high fiber cereal.  Take a laxative if you don't move your bowels at least every other day. Miralax , Milk of Magnesia, Colace , or Senna  can be used to keep you regular.  You will likely need to continue stool softeners and stimulants while taking opioids. Call your Surgeon or Primary Care Provider if it has been greater than 3 days since your last bowel movement.  - Follow up with Primary Care Provider

## 2022-03-12 NOTE — PROGRESS NOTES
[facility] ACUTE PAIN SERVICE    Daily PAIN Progress Note    Assessment/Plan:  Tonya Yang is a 81 year old female who was admitted on 3/7/2022.  Pain team was asked to see the patient for acute on chronic pain. Admitted for back and leg pain, pneumonia of right lower lobe, sepsis, weakness of right lower extremity. Patient reported cough x 1 week. History including DM2,  chronic low back pain radiating into RLE, hearing loss, anxiety, bipolar 1 disorder, chronic pain asthma, COPD, GERD, Meniere's disease,  HTN, hyperlipidemia, CKD, depression, IBS, neuropathy. Spine Surgery consulted.    Patient is followed by [facility], she is schedule for 2 epidural injections, and she reports minimal relief with previous injection. It appears they talked to her about Pain Pump.      Opioid Induced Respiratory Depression Risk Assessment:?High: age, concomitant CNS depressants, COPD, asthma, CKD    Lumbar/sacral injection in IR 3/11/22     1) Patient with multiple medical comorbidities and active pneumonia.  She also has been on chronic steroids for the last 35 years and has longstanding type 2 diabetes with severe peripheral neuropathy.  She has cervical stenosis with cervical myelopathy in addition to severe lumbar spinal stenosis.  She has chronic neck pain, arm pain, low back pain, and lower extremity pain with increasing dysfunction in her lower extremities. Orthopedics discussed that in regards to cervical spinal stenosis and cervical myelopathy that surgical intervention would involve a C7-T1 anterior cervical discectomy and fusion.  Also discussed severe spinal stenosis of the lumbar spine and this may cause progressive weakness, pain, and dysfunction of the lower extremities, bladder, and bowel.  Per Orthopedics, both patient and her so were very clear and the natural progression of the these 2 conditions and understood the risks associated with surgical intervention and the risks associated with not  proceeding with surgical intervention.  Per Orthopedics patient and her son were interested in a palliative care approach verse surgical intervention or at least waiting until pneumonia has improved to reevaluate. Pain is consistent with worsening of back pain chronic pain. Per the patient pain became worse over last month previously she had gait assistance with cane but now limited mobility due to weakness and pain. MRI reviewed.   2)Multimodal Medication Therapy  Topical: Lidocaine ointment 5% qid, discontinue patches  NSAID'S: CrCl 46 ml/min, No, CKD3, patient also on prednisone 6 mg daily  Muscle Relaxants: diazepam for anxiety - reports she takes 1-2x prn at home, not daily, only for severe anxiety.  Robaxin 500 mg QID   Adjuvants: Tylenol 650 mg po tid, gabapentin 200 mg po bid  Antidepressants/anxiolytics: Cymbalta 60 mg daily, diazepam 0.5 mg bid prn for anxiety(home med)  Opioids:  Oxycodone 5 mg po qid prn - do not recommend increase in opioids or change to a different opioid medication for chronic pain. Opioids offer more risk than benefit here for chronic pain as patient is high risk for opioid induced respiratory depression due to risk factors as mentioned above.   IV Pain medication: none  3)Non-medication interventions: ice, heat, rest, PT, OT  Acupuncture consult, Integrative consult -patient would like   4)Constipation Prophylaxis: senna/docusate 1-2 po bid prn  5) Care Teams: HMS, Neurosurgery, Orthopedics, IR    -Opioid prescriber has been Ananya Antonio  -[place]  pulled from system on 03/08/22. This indicates current opioid use.  02/08/22 Percocet 5/325 #120  01/21/22 gabapentin 100 mg #360(90 day supply)  01/13/22 Percocet 5/325 #100  01/06/22 diazepam 2 mg #10  Discharge Recommendations - We recommend prescribing the following at the time of discharge: return to using Percocet as she had been before admission. Would recommend taper off chronically, as opioids would not be indicated for this  "chronic pain, will refer her to home pain provider to discuss this. Aspercreme OTC. Robaxin 500 mg qid x56 tabs. APAP prn. PT .  Follow up Primary Care Provider, ortho.     Pain team will sign off. Scripts for discharge complete. Will route note to Primary Care Provider.     Subjective:  Reports pain 3-5/10 and aching pulling in right mid and low back. Reports Robaxin seems helpful and since starting this she feels she is able to stand up straight and walk better. She is tearful about her meals being held for an injection yesterday as well as being given orange juice today. Hopes to discharge to TCU.     Principal Problem:    Pneumonia of right lower lobe due to infectious organism  Active Problems:    Bilateral back pain    Severe sepsis (H)    Type 2 diabetes mellitus with hyperglycemia (H)    Glycosuria      Objective:  Vital signs in last 24 hours:  BP (!) 186/82 (BP Location: Right arm)   Pulse 84   Temp 97.8  F (36.6  C) (Oral)   Resp 18   Ht 1.651 m (5' 5\")   Wt 96.6 kg (213 lb)   LMP  (LMP Unknown)   SpO2 94%   BMI 35.45 kg/m    Weight:   Vitals:    03/07/22 1200 03/08/22 0500 03/11/22 0033   Weight: 83.9 kg (185 lb) 84.4 kg (186 lb) 96.6 kg (213 lb)      Weight change:   Body mass index is 35.45 kg/m .    Intake/Output last 3 shifts:  I/O last 3 completed shifts:  In: 1200 [P.O.:1200]  Out: 2965.43 [Urine:2965.43]  Intake/Output this shift:  I/O this shift:  In: -   Out: 400 [Urine:400]    Review of Systems:   As per subjective, all others negative.    Physical Exam:  General Appearance:  Alert, cooperative, no distress   Head:  Normocephalic, without obvious abnormality, atraumatic   Eyes:  PERRL, conjunctiva/corneas clear, EOM's intact   Nose: Nares normal, septum midline   Throat: Lips, mucosa, and tongue normal; teeth and gums normal   Neck: Supple, symmetrical, trachea midline   Back:   Symmetric, no curvature, ROM normal   Lungs:   Clear to auscultation bilaterally, respirations unlabored "   Chest Wall:  No tenderness or deformity   Heart:  Regular rate and rhythm, S1, S2 normal    Abdomen:   Soft, non-tender, bowel sounds active all four quadrants,  no masses, no organomegaly    Extremities: Extremities normal, atraumatic   Skin: Redness noted over right antecubital area   Neurologic: Alert and oriented X 3, Moves all 4 extremities     Imaging: Reviewed      Labs: Reviewed   Recent Results (from the past 24 hour(s))   Glucose by meter    Collection Time: 03/11/22  3:16 PM   Result Value Ref Range    GLUCOSE BY METER POCT 176 (H) 70 - 99 mg/dL   Glucose by meter    Collection Time: 03/11/22  8:56 PM   Result Value Ref Range    GLUCOSE BY METER POCT 449 (H) 70 - 99 mg/dL   Glucose by meter    Collection Time: 03/12/22  2:09 AM   Result Value Ref Range    GLUCOSE BY METER POCT 395 (H) 70 - 99 mg/dL   Glucose by meter    Collection Time: 03/12/22  8:09 AM   Result Value Ref Range    GLUCOSE BY METER POCT 257 (H) 70 - 99 mg/dL   Glucose by meter    Collection Time: 03/12/22  9:07 AM   Result Value Ref Range    GLUCOSE BY METER POCT 412 (H) 70 - 99 mg/dL       Total time spent 25 minutes with greater than 50% in consultation, education and coordination of care, examination of patient, review of medical record, lab and imaging results, completion of documentation, and discussion with RN, MD, Senior Acupuncturist, and pharmacist.      TIERRA CarboneP-C  Acute Care Pain Management Program  [facility] ([place], [place], [place])  Monday-Friday 8a-4p   Page via online paging system or call [phone]

## 2022-03-12 NOTE — PROGRESS NOTES
Hospitalist Progress Note    Assessment/Plan    Principal Problem:    Pneumonia of right lower lobe due to infectious organism  Active Problems:    Bilateral back pain    Severe sepsis (H)    Type 2 diabetes mellitus with hyperglycemia (H)    Glycosuria    81-year-old patient with past medical history of diabetes chronic back pain, overactive bladder, GERD, anxiety, progressive spinal stenosis, presented to the hospital for acute on chronic lower back pain, she was found to have severe sepsis secondary to pneumonia      Severe sepsis resolved,  On admission she had tachycardia leukocytosis and chest x-ray concerning for medial right lobe consolidation started on Rocephin and Zithromax,  Blood cultures continue to be negative,  Switch to p.o. antibiotic tomorrow    Pneumonia of the right lobe  On Rocephin and Zithromax, switch to p.o. antibiotic starting tomorrow improving,  Blood culture negative,  Swallow study done no evidence of aspiration,      Acute on chronic back pain  It became more progressive in the last 1 month, on admission she had MRI that showed significant stenosis of her lumbar thoracic and cervical spine there was some concern about compression of the bilateral exiting cauda equina nerve roots on the left side greater than right,  Spine surgery consulted neurosurgery as well spine surgery discussed with the patient and her son, about surgery  No plan for surgery at this time, underwent IR a steroid injection yesterday  It seems to improve her ambulation and pain control, OT PT ordered, plan to go to TCU        Incidental soft tissue finding  On MRI thoracic spine, there is small well circumscribed enhancing lesion along the left anterolateral paravertebral soft tissue measure 1.2 x 1.1 x 1.4 cm  Nonspecific finding could represent neurofibroma/schwannoma, nerve sheath tumor, metastatic lymph node or atypical metastatic mass, radiologist recommended CT chest for further assessment   -Neurosurgery  consulted, CT chest ordered March 10 showed an mass in the left anterior paravertebral fat posterior to the descending thoracic aorta,  Most likely represent lymph node, recommend 6-month follow-up CT   neurosurgery signed off  -I updated the son about this finding and the follow-up CT recommended in 6 months    Type 2 diabetes with hyperglycemia  She was started hyperglycemic today received insulin boluses and Lantus,  Blood sugar appears to be controlled,  Plan to resume her oral diabetic medication upon discharge if her blood sugar continue to be improving      Hypertension  Blood pressure still elevated despite resuming amlodipine and triamterene hydrochlorothiazide  Added Coreg for better control    Ménière's disease  On triamterene hydrochlorothiazide prednisone, resumed         GERD   patient states she had  Occasional food stuck in her esophagus, on pantoprazole, swallow study was done no evidence of aspiration     Goals of care discussion  Palliative care consulted, discussed with the patient, and her son, family not interested in hospice,    Anxiety,  As needed Ativan ordered, duloxetine and Lamictal        Barriers to Discharge: Placement    Anticipated discharge date/Disposition: TCU tomorrow    Subjective  Patient was seen today she feels much better after steroid injection, she is worried about taking orange juice will raise her blood sugar, there was initial plan to discharge to TCU with that was canceled because they did not receive orders by noon, planning on admission to TCU tomorrow    Objective    Vital signs in last 24 hours  Temp:  [97.7  F (36.5  C)-97.8  F (36.6  C)] 97.8  F (36.6  C)  Pulse:  [79-91] 84  Resp:  [16-18] 18  BP: (146-186)/(63-85) 146/63  SpO2:  [91 %-100 %] 94 % [unfilled] O2 Device: None (Room air)    Weight:   [unfilled] Weight change:     Intake/Output last 3 shifts  I/O last 3 completed shifts:  In: 1200 [P.O.:1200]  Out: 2965.43 [Urine:2965.43]  Body mass index is 35.45  kg/m .    Physical Exam:  General Appearance: Alert, oriented x3, does not appear in distress.  Heart: :Regular rate and rhythm, normal S1 ,S2, No murmurs, no JVD, no pedal edema   Lungs: Clear to auscultation bilaterally. No wheezing or crackles  Abdomen: Soft, non tender, no rebound or rigidity, non distended, bowel sounds present.  Extremity: No deformity. No joint swelling.      Pertinent Labs   Lab Results: personally reviewed.   Recent Labs   Lab 03/07/22  1354      CO2 25   BUN 23     Recent Labs   Lab 03/11/22  0510 03/08/22  0626 03/07/22  1354   WBC 8.9 19.5* 26.2*   HGB 8.7* 8.5* 10.4*   HCT 32.4* 31.1* 37.8    272 326     No results for input(s): CKTOTAL, TROPONINI in the last 168 hours.    Invalid input(s): TROPONINT, CKMBINDEX  Invalid input(s): POCGLUFGR    Advanced Care Planning:  Discharge Planning discussed with patient  Total time with this patient is 35 min with 50% of time spent in examining the patient, reviewing records, discussing plan of care and counseling, 50% of time spent in coordination of care.  Care discussed and coordinated with RN, care manager.      Hayder Mishra MD  Internal Medicine Hospitalist  3/12/2022

## 2022-03-12 NOTE — PLAN OF CARE
Problem: Plan of Care - These are the overarching goals to be used throughout the patient stay.    Goal: Optimal Comfort and Wellbeing  Outcome: Ongoing, Progressing     Problem: Pain Chronic (Persistent)  Goal: Acceptable Pain Control and Functional Ability  Outcome: Ongoing, Progressing  Intervention: Manage Persistent Pain  Recent Flowsheet Documentation  Taken 3/11/2022 1545 by Tanesha Espino RN  Medication Review/Management: medications reviewed    Chronic back pain being controlled with scheduled pain meds. Continues to be anxious and needing frequent reassurance. Anxiety improving with pain meds.      Problem: Respiratory Compromise (Pneumonia)  Goal: Effective Oxygenation and Ventilation  Outcome: Ongoing, Progressing  Intervention: Promote Airway Secretion Clearance  Recent Flowsheet Documentation  Taken 3/11/2022 1545 by Tanesha Espino RN  Cough And Deep Breathing: done with encouragement  Intervention: Optimize Oxygenation and Ventilation  Recent Flowsheet Documentation  Taken 3/11/2022 1545 by Tanesha Espino RN  Head of Bed (HOB) Positioning: HOB at 60 degrees    Pt on RA. Sat @ 93%. Denies SOB. Lung sounds diminished with crackles in R lower lobe. Up with assist 1.

## 2022-03-12 NOTE — PLAN OF CARE
Problem: Pain Chronic (Persistent)  Goal: Acceptable Pain Control and Functional Ability  Outcome: Ongoing, Progressing  Intervention: Develop Pain Management Plan  Recent Flowsheet Documentation  Taken 3/12/2022 0327 by Heber Durham RN  Pain Management Interventions: medication (see MAR)   Goal Outcome Evaluation:      Pt verbalized back pain, rated at 4-5/10, gave prn oxy, repositioned, promoted rest/sleep. Pain med effective, will continue to monitor and offer comfort measures.

## 2022-03-13 ENCOUNTER — LAB REQUISITION (OUTPATIENT)
Dept: LAB | Facility: CLINIC | Age: 82
End: 2022-03-13
Payer: MEDICARE

## 2022-03-13 VITALS
RESPIRATION RATE: 18 BRPM | HEIGHT: 65 IN | BODY MASS INDEX: 35.49 KG/M2 | OXYGEN SATURATION: 94 % | HEART RATE: 60 BPM | SYSTOLIC BLOOD PRESSURE: 140 MMHG | TEMPERATURE: 97.5 F | DIASTOLIC BLOOD PRESSURE: 59 MMHG | WEIGHT: 213 LBS

## 2022-03-13 PROBLEM — R93.89 ABNORMAL FINDING ON CT SCAN: Status: ACTIVE | Noted: 2022-03-13

## 2022-03-13 PROBLEM — R22.2 PARAVERTEBRAL MASS: Status: ACTIVE | Noted: 2022-03-13

## 2022-03-13 LAB
GLUCOSE BLDC GLUCOMTR-MCNC: 176 MG/DL (ref 70–99)
GLUCOSE BLDC GLUCOMTR-MCNC: 230 MG/DL (ref 70–99)

## 2022-03-13 PROCEDURE — 250N000013 HC RX MED GY IP 250 OP 250 PS 637: Performed by: NURSE PRACTITIONER

## 2022-03-13 PROCEDURE — 99239 HOSP IP/OBS DSCHRG MGMT >30: CPT | Performed by: HOSPITALIST

## 2022-03-13 PROCEDURE — 250N000013 HC RX MED GY IP 250 OP 250 PS 637: Performed by: INTERNAL MEDICINE

## 2022-03-13 PROCEDURE — 250N000012 HC RX MED GY IP 250 OP 636 PS 637: Performed by: STUDENT IN AN ORGANIZED HEALTH CARE EDUCATION/TRAINING PROGRAM

## 2022-03-13 PROCEDURE — 250N000013 HC RX MED GY IP 250 OP 250 PS 637: Performed by: STUDENT IN AN ORGANIZED HEALTH CARE EDUCATION/TRAINING PROGRAM

## 2022-03-13 PROCEDURE — 250N000013 HC RX MED GY IP 250 OP 250 PS 637: Performed by: HOSPITALIST

## 2022-03-13 PROCEDURE — U0003 INFECTIOUS AGENT DETECTION BY NUCLEIC ACID (DNA OR RNA); SEVERE ACUTE RESPIRATORY SYNDROME CORONAVIRUS 2 (SARS-COV-2) (CORONAVIRUS DISEASE [COVID-19]), AMPLIFIED PROBE TECHNIQUE, MAKING USE OF HIGH THROUGHPUT TECHNOLOGIES AS DESCRIBED BY CMS-2020-01-R: HCPCS | Performed by: INTERNAL MEDICINE

## 2022-03-13 RX ADMIN — ACETAMINOPHEN 650 MG: 325 TABLET ORAL at 08:14

## 2022-03-13 RX ADMIN — TRIAMTERENE AND HYDROCHLOROTHIAZIDE 1 TABLET: 37.5; 25 TABLET ORAL at 08:13

## 2022-03-13 RX ADMIN — OXYCODONE HYDROCHLORIDE 5 MG: 5 TABLET ORAL at 12:08

## 2022-03-13 RX ADMIN — LAMOTRIGINE 100 MG: 100 TABLET ORAL at 08:13

## 2022-03-13 RX ADMIN — METHOCARBAMOL 500 MG: 500 TABLET ORAL at 08:13

## 2022-03-13 RX ADMIN — METHOCARBAMOL 500 MG: 500 TABLET ORAL at 12:08

## 2022-03-13 RX ADMIN — AMLODIPINE BESYLATE 10 MG: 10 TABLET ORAL at 08:14

## 2022-03-13 RX ADMIN — AMOXICILLIN AND CLAVULANATE POTASSIUM 1 TABLET: 875; 125 TABLET, FILM COATED ORAL at 08:13

## 2022-03-13 RX ADMIN — PANTOPRAZOLE SODIUM 40 MG: 20 TABLET, DELAYED RELEASE ORAL at 08:13

## 2022-03-13 RX ADMIN — INSULIN GLARGINE 6 UNITS: 100 INJECTION, SOLUTION SUBCUTANEOUS at 09:00

## 2022-03-13 RX ADMIN — GABAPENTIN 200 MG: 100 CAPSULE ORAL at 08:13

## 2022-03-13 RX ADMIN — HYDROCORTISONE: 1 CREAM TOPICAL at 08:33

## 2022-03-13 RX ADMIN — CARVEDILOL 3.12 MG: 3.12 TABLET, FILM COATED ORAL at 08:13

## 2022-03-13 RX ADMIN — PREDNISONE 6 MG: 1 TABLET ORAL at 08:14

## 2022-03-13 RX ADMIN — DULOXETINE HYDROCHLORIDE 60 MG: 60 CAPSULE, DELAYED RELEASE PELLETS ORAL at 08:13

## 2022-03-13 RX ADMIN — OXYCODONE HYDROCHLORIDE 5 MG: 5 TABLET ORAL at 03:28

## 2022-03-13 ASSESSMENT — ACTIVITIES OF DAILY LIVING (ADL)
ADLS_ACUITY_SCORE: 11

## 2022-03-13 NOTE — DISCHARGE SUMMARY
[facility] MEDICINE  DISCHARGE SUMMARY     Primary Care Physician: Ananya Mclean  Admission Date: 3/7/2022   Discharge Provider: Hayder iMshra MD Discharge Date: 3/13/2022   Diet:   Active Diet and Nourishment Order   Procedures     Combination Diet Regular Diet; Thin Liquids (level 0); Moderate Consistent Carb (60 g CHO per Meal) Diet; Regular Diet     Diet       Code Status: Full Code   Activity: DCACTIVITY: Activity as tolerated        Condition at Discharge: Stable     REASON FOR PRESENTATION(See Admission Note for Details)   Acute on chronic back pain    PRINCIPAL & ACTIVE DISCHARGE DIAGNOSES     Principal Problem:    Pneumonia of right lower lobe due to infectious organism  Active Problems:    Bilateral back pain    Severe sepsis (H)    Type 2 diabetes mellitus with hyperglycemia (H)    Glycosuria    Paravertebral mass    Abnormal finding on CT scan      PENDING LABS     Unresulted Labs Ordered in the Past 30 Days of this Admission     No orders found from 2/5/2022 to 3/8/2022.            PROCEDURES ( this hospitalization only)          RECOMMENDATIONS TO OUTPATIENT PROVIDER FOR F/U VISIT     Follow-up Appointments     Follow Up and recommended labs and tests      Follow up with Nursing home physician.  No follow up labs or test are   needed.  Follow up CT chest in 6 months for the abnormal CT finding                 DISPOSITION     Skilled Nursing Facility    SUMMARY OF HOSPITAL COURSE:      81-year-old patient with type 2 diabetes, severe spinal stenosis, hypertension, polyarthralgia, cochlear hydrops, presented with acute on chronic back pain became worse for last 1 month that she could not ambulate with a cane anymore, further work-up in the ER was concerning for lactic acidosis leukocytosis, and chest x-ray was concerning for medial right lower lobe pneumonia, received a fluid bolus, pain medication, Zosyn and vancomycin, admitted for further work-up      Severe sepsis  Community-acquired pneumonia  Upon presentation she had leukocytosis lactic acidosis and chest x-ray concerning for right-sided pneumonia, she had a cough for 1 week and some symptoms of regurgitation,  Blood culture obtained continue to be negative,  Received ceftriaxone and azithromycin while inpatient continue to clinically improve,-  -guaifenesin for cough,  -Transition to p.o. Augmentin to finish the treatment course for pneumonia, her sepsis has resolved,      Acute on chronic back pain  She previously used a cane but became bedbound and in the past few months,  Further work-up in the ER was concerning for multilevel spinal stenosis severe, with the bilateral neural foraminal stenosis and compression of bilateral exiting cauda equina nerve roots left greater than right,  And spine surgery consulted and discussed with the patient that her son, including risks and benefits of surgery whether doing it or not, recommended to recover from pneumonia and reassess the need for surgery,  Later during the hospital stay consulted interventional radiology for epidural steroid injection-which she had on March 11 with good effect improved her pain and she was able to participate with therapy and ambulate easier,      Incidental soft tissue finding on MRI   thoracic spine MRI showed  small well circumscribed enhancing lesion along the left anterolateral paravertebral soft tissue measure 1.2 x 1.1 x 1.4 cm  Nonspecific finding could represent neurofibroma/schwannoma, nerve sheath tumor, metastatic lymph node or atypical metastatic mass, radiologist recommended CT chest for further assessment   -Neurosurgery consulted and ordered CT chest for further evaluation  CT chest showed mass in the left anterior paravertebral fat posterior to the descending thoracic aorta most likely represent lymph node recommend 6-month follow-up CT,  -Discussed with the patient's son Davie   and recommended to schedule follow-up CT  in 6 months      Hypertension  Patient takes triamterene hydrochlorothiazide for Ménière disease, also with increasing dose of amlodipine,blood pressure continue to be elevated,   started on Coreg with good control, continue upon discharge hold if systolic blood pressure less than 60      Ménière disease  As she takes prednisone and triamterene hydrochlorothiazide, resumed      Mood disorder or anxiety  Patient takes duloxetine Lamictal and as needed Ativan for anxiety, resumed    Overactive bladder  On oxybutynin    GERD  Before she came to the hospital she had symptoms of food stuck in her esophagus on PPI, swallow study was not concerning for aspiration  -Consider follow-up with GI if symptoms recur may consider esophagogram to evaluate for motility issues      Skin rash  Noted to have rash in the right antecubital area likely secondary to tape, triamcinolone cream can be used after discharge    Diabetes with hyperglycemia  She is on oral medication at home her A1c 8.8,  Was on insulin while inpatient she had hyperglycemia secondary to steroid injection continue to improve,  Recommended resuming oral medications after discharge with monitoring of blood sugar consider reinstitution of insulin if no control of the blood sugar    Patient continued to clinically improve and patient is ready for discharge to transitional care today    Discharge Medications with Med changes:     Current Discharge Medication List      START taking these medications    Details   acetaminophen (TYLENOL) 325 MG tablet Take 1-2 tablets (325-650 mg) by mouth every 6 hours as needed for mild pain    Associated Diagnoses: Chronic midline low back pain with right-sided sciatica; Other chronic pain      amLODIPine (NORVASC) 10 MG tablet Take 1 tablet (10 mg) by mouth daily    Associated Diagnoses: Benign essential hypertension      amoxicillin-clavulanate (AUGMENTIN) 875-125 MG tablet Take 1 tablet by mouth every 12 hours  Qty: 7 tablet,  Refills: 0    Associated Diagnoses: Pneumonia of right lower lobe due to infectious organism      carvedilol (COREG) 3.125 MG tablet Take 1 tablet (3.125 mg) by mouth 2 times daily (with meals)    Associated Diagnoses: Benign essential hypertension      guaiFENesin-dextromethorphan (ROBITUSSIN DM) 100-10 MG/5ML syrup Take 10 mLs by mouth every 4 hours as needed for cough or congestion (productive cough)  Qty: 118 mL, Refills: 0    Associated Diagnoses: Pneumonia of right lower lobe due to infectious organism      lidocaine (LMX4) 4 % external cream Apply topically 4 times daily as needed for mild pain or moderate pain Apply to painful areas, avoid red or open skin, avoid heat over ointment    Associated Diagnoses: Chronic midline low back pain with right-sided sciatica; Other chronic pain      methocarbamol (ROBAXIN) 500 MG tablet Take 1 tablet (500 mg) by mouth 4 times daily  Qty: 56 tablet, Refills: 0    Associated Diagnoses: Chronic midline low back pain with right-sided sciatica; Other chronic pain         CONTINUE these medications which have CHANGED    Details   diazepam (VALIUM) 1 MG/ML solution Take 0.5 mLs (0.5 mg) by mouth 2 times daily as needed for agitation, anxiety or muscle spasms  Qty: 3 mL, Refills: 0    Associated Diagnoses: Chronic pain syndrome      gabapentin (NEURONTIN) 100 MG capsule Take 2 capsules (200 mg) by mouth 2 times daily  Qty: 2 capsule, Refills: 0    Associated Diagnoses: Chronic pain syndrome      glipiZIDE (GLUCOTROL) 10 MG tablet Take 1 tablet (10 mg) by mouth 2 times daily (before meals)    Associated Diagnoses: Type 2 diabetes mellitus with diabetic nephropathy, without long-term current use of insulin (H)      oxyCODONE-acetaminophen (PERCOCET) 5-325 MG tablet Take 1 tablet by mouth every 4 hours as needed for severe pain  Qty: 2 tablet, Refills: 0    Associated Diagnoses: Chronic midline low back pain with right-sided sciatica         CONTINUE these medications which have NOT  CHANGED    Details   atorvastatin (LIPITOR) 10 MG tablet Take 10 mg by mouth At Bedtime      Barberry-Oreg Grape-Goldenseal (BERBERINE COMPLEX PO) Take 1 tablet by mouth daily      cyanocobalamin 1000 MCG SUBL Place 1,000 mcg under the tongue daily      DULoxetine (CYMBALTA) 60 MG EC capsule TAKE 1 CAPSULE (60 MG) BY MOUTH DAILY  Qty: 90 capsule, Refills: 1    Comments: Needs to be seen by March  Associated Diagnoses: Depressed bipolar I disorder in remission (H)      fluconazole (DIFLUCAN) 150 MG tablet Take 150 mg by mouth See Admin Instructions One dose prn yeast infection       JARDIANCE 25 MG TABS tablet Take 25 mg by mouth daily       lamoTRIgine (LAMICTAL) 100 MG tablet Take 100 mg by mouth 2 times daily      metFORMIN (GLUCOPHAGE) 500 MG tablet Take 0.5 tablets (250 mg) by mouth 2 times daily (with meals)  Qty: 15 tablet, Refills: 0    Associated Diagnoses: Type 2 diabetes mellitus with diabetic nephropathy, without long-term current use of insulin (H)      metoclopramide (REGLAN) 10 MG tablet Take 10 mg by mouth 3 times daily as needed Before meals as needed.      nystatin (MYCOSTATIN) 600752 UNIT/GM external powder Apply topically 2 times daily as needed Breast rash      omeprazole (PRILOSEC) 20 MG DR capsule Take 20 mg by mouth daily       oxybutynin ER (DITROPAN-XL) 10 MG 24 hr tablet Take 10 mg by mouth At Bedtime       predniSONE (DELTASONE) 1 MG tablet Take 6 mg by mouth daily 1mg tab and 5mg tab      triamcinolone (KENALOG) 0.1 % external cream Apply topically 2 times daily as needed       triamterene-HCTZ (MAXZIDE-25) 37.5-25 MG tablet Take 1 tablet by mouth daily      vitamin D3 (CHOLECALCIFEROL) 50 mcg (2000 units) tablet Take 3 tablets by mouth daily      ACE/ARB NOT PRESCRIBED, INTENTIONAL, 1 each continuous prn ACE & ARB not prescribed due to CKD (Chronic Kidney Disease)    Associated Diagnoses: Type 2 diabetes, HbA1c goal < 7% (H)      blood glucose (ACCU-CHEK FASTCLIX) lancing device FOR  TESTING ONCE DAILY. DX  E11.9 TYPE 2 DIABETES      !! blood glucose (CONTOUR TEST) test strip TESTING EVERY DAY DX  E11.9      !! CONTOUR NEXT TEST test strip TESTING EVERY DAY DX  E11.9      order for DME Equipment being ordered: wrist brace  Qty: 1 Device, Refills: 0    Associated Diagnoses: Tenosynovitis, de Quervain       !! - Potential duplicate medications found. Please discuss with provider.      STOP taking these medications       HYDROmorphone (DILAUDID) 2 MG tablet Comments:   Reason for Stopping:         Lidocaine (LIDOCARE) 4 % Patch Comments:   Reason for Stopping:                     Rationale for medication changes:      Augmentin for treatment of pneumonia  Robaxin for muscle spasm-  -Robitussin for cough and congestion    Coreg to treat blood pressure, hold if heart rate less than 60        Consults       PHARMACY TO DOSE VANCO  SOCIAL WORK IP CONSULT  PAIN MANAGEMENT ADULT IP CONSULT  DIABETES EDUCATION IP CONSULT  SPINE SURGERY ADULT IP CONSULT  PALLIATIVE CARE ADULT IP CONSULT  INTEGRATIVE THERAPIES CONSULT  ACUPUNCTURE IP CONSULT  NEUROSURGERY IP CONSULT  PALLIATIVE CARE ADULT IP CONSULT  SPEECH LANGUAGE PATH ADULT IP CONSULT  PHYSICAL THERAPY ADULT IP CONSULT  PHYSICAL THERAPY ADULT IP CONSULT  OCCUPATIONAL THERAPY ADULT IP CONSULT    Immunizations given this encounter     Most Recent Immunizations   Administered Date(s) Administered     COVID-19,PF,Pfizer (12+ Yrs) 03/29/2021     Influenza (High Dose) 3 valent vaccine 10/01/2017     Influenza (IIV3) PF 10/08/2012     Influenza Vaccine Im 4yrs+ 4 Valent CCIIV4 10/05/2021     Pneumo Conj 13-V (2010&after) 02/18/2016     Pneumococcal 23 valent 10/02/2014     TDAP Vaccine (Adacel) 07/18/2013                 SIGNIFICANT IMAGING FINDINGS     Results for orders placed or performed during the hospital encounter of 03/07/22   Chest XR,  PA & LAT    Impression    IMPRESSION:     There is focal consolidation in the medial right lower lobe consistent with  pneumonia. Left lung remains well-expanded without airspace opacity.    Normal heart and mediastinal contours.    No pleural effusion or pneumothorax.   MR Thoracic Spine w/o & w Contrast    Impression    IMPRESSION:  1.  No abnormal spinal cord signal or intramedullary/leptomeningeal enhancement.  2.  Multilevel degenerative changes, as above.  3.  Small T2 hypointense, STIR hyperintense well-circumscribed enhancing lesion along the left anterolateral paravertebral soft tissues measuring 1.2 x 1.1 x 1.4 cm (AP x TV x CC). This finding is nonspecific but could reflect a neurofibroma/schwannoma,   nerve sheath tumor, metastatic lymph node, atypical mediastinal mass or other lesion. Consider dedicated CT chest for further assessment.   MR Cervical Spine w/o & w Contrast    Impression    IMPRESSION:  1.  Prominent left lateral recess disc osteophyte complex at C7-T1 with an associated superimposed midline disc protrusion/extrusion with caudal migration. At this level there is severe central spinal canal stenosis with severe left lateral recess   stenosis and severe left neural foraminal stenosis. Patchy associated increased signal within the central spinal cord at this level may reflect edema versus myelomalacia.  2.  Edema and enhancement involving the endplates of C7 and T1 is favored to be degenerative in etiology, although infection could have a similar appearance.  3.  Mild ventral and right lateral epidural enhancement noted centered surrounding the C7-T1 intervertebral disc is favored to be reactive, although infection could have a similar appearance. Correlation for any signs/symptoms of infection is   recommended.  4.  Nonspecific edema and enhancement noted involving the right C7-T1 facet joint, possibly degenerative versus infectious/inflammatory in etiology.  5.  Additional multilevel degenerative spondylolisthesis, as above.              MR Lumbar Spine w/o Contrast    Impression    IMPRESSION:  1.   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  2.  At L4-L5 there is severe central spinal canal stenosis with severe bilateral subarticular zone stenosis, moderate left neural foraminal stenosis and mild right neural foraminal stenosis.  3.  At L5-S1 there is mild central spinal canal stenosis with severe bilateral neural foraminal stenosis and compression of the bilateral exiting cauda equina nerve roots, left greater than right.  4.  At L3-L4 there is severe central spinal canal stenosis with mild left and mild to moderate right neural foraminal stenosis.  5.  At L2-L3 there is moderate central spinal canal stenosis with mild right neural foraminal stenosis.   6.  Multilevel fluid in the facet joints, as described above, is favored to be degenerative in etiology, however, an infectious/inflammatory facet synovitis could have a similar appearance.               CT Chest w Contrast    Impression    IMPRESSION:   1.  Circumscribed 1.8 x 1.4 x 1.1 cm mass left anterior paravertebral fat posterior to the descending thoracic aorta (series 4 image 81) is unchanged since 03/07/2022 MRI thoracic spine but new compared with 10/01/2013 CT and most likely represents a   lymph node. Recommend 6 month follow-up CT.  2.  Nodular and groundglass density opacities and interlobular septal thickening in a peribronchial distribution central right upper, middle and lower lobes consistent with pneumonia with associated right reactive mediastinal and hilar lymph node   enlargement.  3.  Mild bronchial wall thickening and upper lung centrilobular emphysema.   4.  Enlargement central pulmonary arteries compatible with some degree of chronic pulmonary arterial hypertension.  5.  Chronic patulous dilatation of the esophagus.  6.  Moderate to severe hepatic steatosis and mild hepatomegaly with some degree of underlying steatohepatitis likely.            Discharge Orders        General info for SNF     Length of Stay Estimate: Short Term Care: Estimated # of Days <30  Condition at Discharge: Improving  Level of care:skilled   Rehabilitation Potential: Good  Admission H&P remains valid and up-to-date: Yes  Recent Chemotherapy: N/A  Use Nursing Home Standing Orders: Yes     Mantoux instructions    Give two-step Mantoux (PPD) Per Facility Policy Yes     Reason for your hospital stay    Severe back pain secondary to severe spinal stenosis, pneumonia,     Activity - Up ad timothy     Follow Up and recommended labs and tests    Follow up with Nursing home physician.  No follow up labs or test are needed.  Follow up CT chest in 6 months for the abnormal CT finding     Physical Therapy Adult Consult    Evaluate and treat as clinically indicated.    Reason: Spinal stenosis back pain,     Occupational Therapy Adult Consult    Evaluate and treat as clinically indicated.    Reason: Back pain, spinal stenosis     Fall precautions     Diet    Follow this diet upon discharge: Orders Placed This Encounter      Combination Diet Regular Diet; Thin Liquids (level 0); Moderate Consistent Carb (60 g CHO per Meal) Diet; Regular Diet       Examination   Physical Exam   Temp:  [97.5  F (36.4  C)-97.8  F (36.6  C)] 97.5  F (36.4  C)  Pulse:  [60-77] 60  Resp:  [16-18] 18  BP: (127-148)/(58-75) 140/59  SpO2:  [94 %-96 %] 94 %  Wt Readings from Last 1 Encounters:   03/11/22 96.6 kg (213 lb)         General Appearance: Alert, oriented x3, does not appear in distress.  HEENT: Normocephalic. No scleral icterus, . Mucous membranes moist.  Heart: :Regular rate and rhythm, normal S1 ,S2, No murmurs, no JVD, no pedal edema   Lungs: Clear to auscultation bilaterally. No wheezing or crackles  Abdomen: Soft, non tender, no rebound or rigidity, non distended, bowel sounds present.      Please see EMR for more detailed significant labs, imaging, consultant notes etc.    Hayder SANDOVAL MD, personally saw the patient today and spent greater than 30  minutes discharging this patient.    Hayder Mishra MD  [facility]    CC:Ananya Mclean

## 2022-03-13 NOTE — PLAN OF CARE
Problem: Plan of Care - These are the overarching goals to be used throughout the patient stay.    Goal: Plan of Care Review/Shift Note  Description: The Plan of Care Review/Shift note should be completed every shift.  The Outcome Evaluation is a brief statement about your assessment that the patient is improving, declining, or no change.  This information will be displayed automatically on your shift note.  Outcome: Ongoing, Progressing   Goal Outcome Evaluation:      Patient with high blood sugars after steroid injection, extra novolog and lantus added this morning, blood sugars are better controlled.   Coreg added for high blood pressure, blood pressure improved. Patient is getting prn oxycodone for pain and prn valium for anxiety. This is helping keep her pain at tolerable level. Patient ambulated in dalal and up to chair today. Pt switched to oral antibiotics. Patient adequately oxygenated on room. Plan for discharge tomorrow to [facility] .

## 2022-03-13 NOTE — PLAN OF CARE
Problem: Plan of Care - These are the overarching goals to be used throughout the patient stay.    Goal: Readiness for Transition of Care  Outcome: Ongoing, Progressing   Goal Outcome Evaluation:           A/O very [place]. VSS, c/o pain in lower back prn meds utilized. CHO diet- monitoring BG 200s overnight. Plan to discharge to TCU.

## 2022-03-13 NOTE — PROGRESS NOTES
Care Management Discharge Note    Discharge Date: 03/13/2022       Discharge Disposition:  [facility] TCU)    Discharge Services:  TCU    Discharge DME: None    Discharge Transportation: family or friend will provide    PAS Confirmation Code:  ([id number])    Patient/family educated on Medicare website which has current facility and service quality ratings: yes    Education Provided on the Discharge Plan:  AVS per bedside RN.     Persons Notified of Discharge Plans: patient and son    Patient/Family in Agreement with the Plan: yes     Handoff Referral Completed: Yes    Additional Information:  Chart reviewed. Orders placed by MD. ANGELIQUE faxed order to [facility]. PATRICIA faxed medication clarification and indications to [facility].t Patient discharge to [facility] today. Son provided transportation to TCU.       PATRICIA has paged MD to get the indications  put on the orders. PATRICIA will fax the orders with indications to [facility]. 9:17 AM     PATRICIA spoke with MD. PATRICIA now has a list of the medications that need indications.    PATRICIA returned call to [facility] admissions.[facility] admissions stated that some mediations on the discharge orders were missing indications. Need MD to add indications. 8:59 AM      PATRICIA called admissions at [facility] (left VM)  to confirm that they have received the orders. 7:23 AM     Daniella Mcallister RN

## 2022-03-13 NOTE — PLAN OF CARE
"Problem: Plan of Care - These are the overarching goals to be used throughout the patient stay.    Goal: Plan of Care Review/Shift Note  Description: The Plan of Care Review/Shift note should be completed every shift.  The Outcome Evaluation is a brief statement about your assessment that the patient is improving, declining, or no change.  This information will be displayed automatically on your shift note.  Outcome: Adequate for Care Transition  Goal: Patient-Specific Goal (Individualized)  Description: You can add care plan individualizations to a care plan. Examples of Individualization might be:  \"Parent requests to be called daily at 9am for status\", \"I have a hard time hearing out of my right ear\", or \"Do not touch me to wake me up as it startles me\".  Outcome: Adequate for Care Transition  Goal: Absence of Hospital-Acquired Illness or Injury  Outcome: Adequate for Care Transition  Intervention: Identify and Manage Fall Risk  Recent Flowsheet Documentation  Taken 3/13/2022 0815 by Gaby Contreras RN  Safety Promotion/Fall Prevention:   assistive device/personal items within reach   room door open   room near nurse's station  Intervention: Prevent Skin Injury  Recent Flowsheet Documentation  Taken 3/13/2022 0815 by Gaby Contreras RN  Body Position: position changed independently  Intervention: Prevent and Manage VTE (Venous Thromboembolism) Risk  Recent Flowsheet Documentation  Taken 3/13/2022 0815 by Gaby Contreras RN  Activity Management: ambulated to bathroom  Goal: Optimal Comfort and Wellbeing  Outcome: Adequate for Care Transition  Intervention: Monitor Pain and Promote Comfort  Recent Flowsheet Documentation  Taken 3/13/2022 0815 by Gaby Contreras RN  Pain Management Interventions: medication (see MAR)  Goal: Readiness for Transition of Care  Outcome: Adequate for Care Transition     Goal Outcome Evaluation:      Patient is A&Ox4 and ready to transition to TCU. Pain under control " with PO medications (scheduled and PRN). Blood sugars remain consistent, and insulin are provided when parameters are met. Plan of care to continue today, including transitioning to her next facility to pursue further cares.    Gaby Contreras RN

## 2022-03-14 ENCOUNTER — PATIENT OUTREACH (OUTPATIENT)
Dept: CARE COORDINATION | Facility: CLINIC | Age: 82
End: 2022-03-14
Payer: MEDICARE

## 2022-03-14 DIAGNOSIS — M54.41 CHRONIC MIDLINE LOW BACK PAIN WITH RIGHT-SIDED SCIATICA: ICD-10-CM

## 2022-03-14 DIAGNOSIS — Z71.89 OTHER SPECIFIED COUNSELING: ICD-10-CM

## 2022-03-14 DIAGNOSIS — G89.29 CHRONIC MIDLINE LOW BACK PAIN WITH RIGHT-SIDED SCIATICA: ICD-10-CM

## 2022-03-14 LAB — SARS-COV-2 RNA RESP QL NAA+PROBE: NEGATIVE

## 2022-03-14 RX ORDER — OXYCODONE AND ACETAMINOPHEN 5; 325 MG/1; MG/1
1 TABLET ORAL EVERY 4 HOURS PRN
Qty: 30 TABLET | Refills: 0 | Status: SHIPPED | OUTPATIENT
Start: 2022-03-14 | End: 2022-03-21

## 2022-03-14 NOTE — PROGRESS NOTES
"9:24 AM 03/14/22  CM received call from son Davie stating patient/family unhappy with facility [facility] and requesting alternate placement be found- \"they are not equipped to care for her\". CM offered listening and encouraged son to speak with Director of Nursing and/or SW at facility to inquire about possible transfer. He did not express any safety concerns. He stated he may want to \"take her out of there and home with him, back to the ER if it don't go well\". CM did offer for son to call back to  if any additional assistance was needed.     "

## 2022-03-14 NOTE — PROGRESS NOTES
Clinic Care Coordination Contact    Background: Care Coordination referral placed from [facility] discharge report for reason of patient meeting criteria for a TCM outreach call by [facility] team.    Assessment: Upon chart review, [facility] Team member will cancel/close the referral for TCM outreach due to reason below:    Patient is not established within [facility]. Upon chart review, [facility] Team member noted patient discharged to TCU.    Plan: Care Coordination referral for TCM outreach canceled.    GHISLAINE King  [facility] [place], [facility]

## 2022-03-15 ENCOUNTER — TRANSITIONAL CARE UNIT VISIT (OUTPATIENT)
Dept: GERIATRICS | Facility: CLINIC | Age: 82
End: 2022-03-15
Payer: MEDICARE

## 2022-03-15 VITALS
WEIGHT: 200.4 LBS | DIASTOLIC BLOOD PRESSURE: 82 MMHG | RESPIRATION RATE: 16 BRPM | SYSTOLIC BLOOD PRESSURE: 150 MMHG | HEART RATE: 64 BPM | TEMPERATURE: 97.4 F | OXYGEN SATURATION: 96 % | HEIGHT: 65 IN | BODY MASS INDEX: 33.39 KG/M2

## 2022-03-15 DIAGNOSIS — F32.A ANXIETY AND DEPRESSION: ICD-10-CM

## 2022-03-15 DIAGNOSIS — R22.2 PARAVERTEBRAL MASS: ICD-10-CM

## 2022-03-15 DIAGNOSIS — K59.01 SLOW TRANSIT CONSTIPATION: ICD-10-CM

## 2022-03-15 DIAGNOSIS — I10 ESSENTIAL HYPERTENSION: ICD-10-CM

## 2022-03-15 DIAGNOSIS — M62.830 SPASM OF BACK MUSCLES: ICD-10-CM

## 2022-03-15 DIAGNOSIS — E78.2 MIXED HYPERLIPIDEMIA: ICD-10-CM

## 2022-03-15 DIAGNOSIS — F41.9 ANXIETY AND DEPRESSION: ICD-10-CM

## 2022-03-15 DIAGNOSIS — J18.9 PNEUMONIA OF RIGHT LOWER LOBE DUE TO INFECTIOUS ORGANISM: Primary | ICD-10-CM

## 2022-03-15 DIAGNOSIS — E11.65 TYPE 2 DIABETES MELLITUS WITH HYPERGLYCEMIA, UNSPECIFIED WHETHER LONG TERM INSULIN USE (H): ICD-10-CM

## 2022-03-15 DIAGNOSIS — A41.9 SEVERE SEPSIS (H): ICD-10-CM

## 2022-03-15 DIAGNOSIS — H91.93 BILATERAL HEARING LOSS, UNSPECIFIED HEARING LOSS TYPE: ICD-10-CM

## 2022-03-15 DIAGNOSIS — R65.20 SEVERE SEPSIS (H): ICD-10-CM

## 2022-03-15 DIAGNOSIS — G83.4 CAUDA EQUINA COMPRESSION (H): ICD-10-CM

## 2022-03-15 DIAGNOSIS — H81.03 COCHLEAR HYDROPS OF BOTH EARS: Chronic | ICD-10-CM

## 2022-03-15 DIAGNOSIS — M47.819 MULTILEVEL SPONDYLOSIS: ICD-10-CM

## 2022-03-15 DIAGNOSIS — M48.02 NEURAL FORAMINAL STENOSIS OF CERVICAL SPINE: ICD-10-CM

## 2022-03-15 PROCEDURE — 99306 1ST NF CARE HIGH MDM 50: CPT | Performed by: NURSE PRACTITIONER

## 2022-03-15 NOTE — LETTER
3/15/2022        RE: Tonya Yang  [address]  [place] [address]        [facility]    Name:   Tonya Yang  :   1940  MRN:    [id number]     Facility:   [facility]) [[id number]]   Room: [place] [address]  Code Status: DNR and POLST AVAILABLE - Had been full code in the hospital    DOS:  03/15/2022  Previous visit:  N/A    PCP:  Ananya Mclean NP     CHIEF COMPLAINT / REASON FOR VISIT:  Chief Complaint   Patient presents with     Hospital F/U     NEW ADMISSION: CAP, severe sepsis, acute on chronic back pain      [facility] from 2022 until 2022 (pneumonia, severe sepsis, acute on chronic back pain)      HPI: Tonya is a 81 year old female with a past medical history significant for severe spinal stenosis (multilevel), polyarthralgia, Ménière's disease/cochlear hydrops, diabetes mellitus type 2 (with nephropathy), who presented with acute on chronic back pain that had become worse over the preceding months, and she could no longer ambulate with her cane and became essentially bedbound.  Further work-up in the ER was concerning for lactic acidosis, leukocytosis, and CXR concerning for medial right lower lobe pneumonia.  Treated with a fluid bolus, pain medications, and Zosyn and vancomycin, ultimately admitted for further work-up.    She was found to have leukocytosis and lactic acidosis, and a CXR was concerning for right-sided pneumonia.  She had a cough for about a week with symptoms of regurgitation.  A blood culture was negative.  Diagnosed with CAP and severe sepsis, she was treated with ceftriaxone and azithromycin with a transition to oral Augmentin.    She has had chronic back pain for some time.  Further work-up in the ER was concerning for severe multilevel spinal stenosis with bilateral neuroforaminal stenosis and compression of bilateral exiting cauda equina nerve roots, left greater than right.   Spine surgery was consulted with ensuing discussion with the patient and her son, and it was recommended that she recover from pneumonia and then reassess the need for surgical intervention.  Later during her stay, she underwent an epidural steroid injection (03/11) with good effect, and it made it possible for her to participate with therapy and ambulate easier.    Incidental finding on MRI of the thoracic spine showed a small, well-circumscribed enhancing lesion along the left anterior lateral paravertebral soft tissue, measuring 1.2 x 1.1 x 1.4 cm.  Nonspecific finding could represent neurofibroma/schwannoma, nerve sheath tumor, metastatic lymph node, or atypical metastatic mass.  Radiologist recommended CT chest for further assessment.  This was ordered by neurosurgery, and it showed a mass in the last anterior paravertebral fat posterior to the descending thoracic aorta, most likely representing lymph node with recommendation for 6 months follow-up CT.    She also has essential hypertension that has required multiple medications, including triamterene hydrochlorothiazide (which she takes for Ménière's disease) and increasing doses of amlodipine.  She was started on carvedilol with good control and was kept on this at discharge.    As noted, she also has Ménière's disease for which, in addition to the triamterene hydrochlorothiazide, she also takes prednisone.  She stated that the condition is not severe and is only problematic if she gets up too fast.    She has a mood disorder for which she receives duloxetine, Lamictal, and as needed lorazepam for anxiety.    Overactive bladder places her on oxybutynin.  Occasional urge incontinence.    Diagnosis of GERD, and she did have symptoms of food stuck in her esophagus.  A swallow study was not concerning for aspiration.  Consider follow-up with GI if symptoms persist or recur and you may want to consider esophagogram to evaluate for motility issues.     She  "developed a right antecubital skin rash, likely secondary to tape.  Orders were given for 0.1% triamcinolone cream.    She has diabetes mellitus type 2 with hyperglycemia.  Her last A1c was 8.8.  She was on insulin while inpatient, and hyperglycemia was felt secondary to a steroid injection, and she continued to improve.  She is adamant against giving herself insulin.  She was ultimately ready for discharge to the TCU.    Suspected chronic kidney disease.  While she was not prescribed an ACE/ARB in the hospital, CKD was listed as the reason.  We do not yet have 3 recent metabolic profile.      CURRENT/RECENT TCU ISSUES    Disposition: The patient was discharged in the hospital discharge summary as full code; however, at this juncture she would prefer to be DNR.  She appears to play the role of drama queen during our initial visit and it does a good deal of coughing (although her lungs are clear).  She is very loquacious, wants eagerly to go home.      Pain management: She talks about her \"traveling\" spinal stenosis (she had an epidural in the hospital that was ineffective), right-sided pain from the buttocks, and tells me she is \"wobbly and weak.\"  She has orders for muscle relaxers (methocarbamol 500 mg 4 times daily) and Percocet (5/325 mg every 4 hours as needed.    Anxiety: She is currently experiencing a good deal of anxiety.  She has orders for as needed diazepam (0.5 mg twice daily) and she might very well benefit from scheduling every 12 hours.  She tells me she really wasn't taking it at home.    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  Still, she does manage to hear well enough to carry on a conversation, occasionally needing to resort to writing things down.    Constipation: She does endorse constipation, describing \"meatballs.\"  As far as I know, she is not getting sufficient bowel medications to alleviate this.    Diabetes mellitus type 2 and antecubital rash: She is getting " a steroids for her rash and states that it is raising her blood glucose levels; however, she has been on oral medications at home, recently presenting with an A1c of 8.8 as noted.  She adamantly refuses to accept any idea of giving herself insulin at home..  Her rash being treated with triamcinolone 0.1% cream is improving.    Contacts: Davie, her son ([phone])      ROS:    Positives: As noted above.  Negatives: No headaches or chest pains, coughing or congestion, nausea or vomiting, dyspnea, dysuria, difficulty chewing or swallowing, or any problems with appetite or sleep.    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     Hepatic abscess      Hyperlipidemia      Hypertension      Irritable bowel syndrome      Meniere disease      Neuropathy      Noninfectious ileitis      Peritoneal abscess (H)      Spinal stenosis of lumbar region      Steroid long-term use      Vaginitis, atrophic, postmenopausal      Vitamin D deficiency               Family History   Problem Relation Age of Onset     Cerebrovascular Disease Mother      Heart Disease Mother      Heart Disease Father      Heart Disease Brother      Diabetes No family hx of      Coronary Artery Disease No family hx of      Hypertension No family hx of      Hyperlipidemia No family hx of      Breast Cancer No family hx of      Colon Cancer No family hx of      Prostate Cancer No family hx of      Other Cancer No family hx of      Depression No family hx of      Anxiety Disorder No family hx of      Mental Illness No family hx of      Substance Abuse No family hx of      Anesthesia Reaction No  family hx of      Asthma No family hx of      Osteoporosis No family hx of      Genetic Disorder No family hx of      Thyroid Disease No family hx of      Obesity No family hx of      Unknown/Adopted No family hx of      Social History     Socioeconomic History     Marital status:      Spouse name: None     Number of children: None     Years of education: None     Highest education level: None   Occupational History     None   Tobacco Use     Smoking status: Former Smoker     Types: Cigarettes     Quit date: 11/15/1987     Years since quittin.3     Smokeless tobacco: Former User   Substance and Sexual Activity     Alcohol use: Not Currently     Alcohol/week: 0.0 standard drinks     Comment: MALISSA white since      Drug use: No     Comment: used marijuanna in the past     Sexual activity: Never     Partners: Male   Other Topics Concern     Parent/sibling w/ CABG, MI or angioplasty before 65F 55M? No   Social History Narrative     None     Social Determinants of Health     Financial Resource Strain: Not on file   Food Insecurity: Not on file   Transportation Needs: Not on file   Physical Activity: Not on file   Stress: Not on file   Social Connections: Not on file   Intimate Partner Violence: Not on file   Housing Stability: Not on file       MEDICATIONS: Reviewed from the MAR, physician orders, and/or earlier progress notes.  Post Discharge Medication Reconciliation Status: discharge medications reconciled and changed, per note/orders.    Current Outpatient Medications   Medication Sig     ACE/ARB NOT PRESCRIBED, INTENTIONAL, 1 each continuous prn ACE & ARB not prescribed due to CKD (Chronic Kidney Disease)     acetaminophen (TYLENOL) 325 MG tablet Take 1-2 tablets (325-650 mg) by mouth every 6 hours as needed for mild pain     amLODIPine (NORVASC) 10 MG tablet Take 1 tablet (10 mg) by mouth daily     atorvastatin (LIPITOR) 10 MG tablet Take 10 mg by mouth At Bedtime     blood glucose (ACCU-CHEK  FASTCLIX) lancing device FOR TESTING ONCE DAILY. DX  E11.9 TYPE 2 DIABETES     blood glucose (CONTOUR TEST) test strip TESTING EVERY DAY DX  E11.9     carvedilol (COREG) 3.125 MG tablet Take 1 tablet (3.125 mg) by mouth 2 times daily (with meals)     CONTOUR NEXT TEST test strip TESTING EVERY DAY DX  E11.9     cyanocobalamin 1000 MCG SUBL Place 1,000 mcg under the tongue daily     diazepam (VALIUM) 1 MG/ML solution Take 0.5 mLs (0.5 mg) by mouth 2 times daily as needed for agitation, anxiety or muscle spasms (Patient taking differently: Take 0.5 mg by mouth every 12 hours )     DULoxetine (CYMBALTA) 60 MG EC capsule TAKE 1 CAPSULE (60 MG) BY MOUTH DAILY     fluconazole (DIFLUCAN) 150 MG tablet Take 150 mg by mouth See Admin Instructions One dose prn yeast infection      gabapentin (NEURONTIN) 100 MG capsule Take 2 capsules (200 mg) by mouth 2 times daily     glipiZIDE (GLUCOTROL) 10 MG tablet Take 1 tablet (10 mg) by mouth 2 times daily (before meals)     guaiFENesin-dextromethorphan (ROBITUSSIN DM) 100-10 MG/5ML syrup Take 10 mLs by mouth every 4 hours as needed for cough or congestion (productive cough)     JARDIANCE 25 MG TABS tablet Take 25 mg by mouth daily      lamoTRIgine (LAMICTAL) 100 MG tablet Take 100 mg by mouth 2 times daily     lidocaine (LMX4) 4 % external cream Apply topically 4 times daily as needed for mild pain or moderate pain Apply to painful areas, avoid red or open skin, avoid heat over ointment     metFORMIN (GLUCOPHAGE) 500 MG tablet Take 0.5 tablets (250 mg) by mouth 2 times daily (with meals)     methocarbamol (ROBAXIN) 500 MG tablet Take 1 tablet (500 mg) by mouth 4 times daily     metoclopramide (REGLAN) 10 MG tablet Take 10 mg by mouth 3 times daily as needed Before meals as needed.     nystatin (MYCOSTATIN) 396641 UNIT/GM external powder Apply topically 2 times daily as needed Breast rash     omeprazole (PRILOSEC) 20 MG DR capsule Take 20 mg by mouth daily      order for DME Equipment  "being ordered: wrist brace     oxybutynin ER (DITROPAN-XL) 10 MG 24 hr tablet Take 10 mg by mouth At Bedtime      predniSONE (DELTASONE) 1 MG tablet Take 6 mg by mouth daily 1mg tab and 5mg tab     triamcinolone (KENALOG) 0.1 % external cream Apply topically 2 times daily as needed      triamterene-HCTZ (MAXZIDE-25) 37.5-25 MG tablet Take 1 tablet by mouth daily     vitamin D3 (CHOLECALCIFEROL) 50 mcg (2000 units) tablet Take 3 tablets by mouth daily     oxyCODONE-acetaminophen (PERCOCET) 5-325 MG tablet Take 1 tablet by mouth every 4 hours as needed for severe pain     No current facility-administered medications for this visit.     ALLERGIES:   Allergies   Allergen Reactions     Propofol Nausea and Vomiting     Shellfish Allergy      Other reaction(s): Dizziness       DIET: Diabetic, regular texture, thin liquids.    Vitals:    03/15/22 1009   BP: (!) 150/82   Pulse: 64   Resp: 16   Temp: 97.4  F (36.3  C)   SpO2: 96%   Weight: 90.9 kg (200 lb 6.4 oz)   Height: 1.651 m (5' 5\")     Body mass index is 33.35 kg/m .    EXAMINATION:   General: Pleasant, very loquacious elderly female, sitting in the chair but would be better served with a recliner.  She did a good deal of talking and it was very difficult to get a word in edge wise.  Head: Normocephalic and atraumatic.   Eyes: PERRLA, sclerae clear.   ENT: Moist oral mucosa.  Upper dentures and some remaining lower teeth.  Cardiovascular: Regular rate and rhythm with a 2/6 systolic ejection murmur with split S2 at the left sternal border.   Respiratory: Lungs clear to auscultation bilaterally.   Abdomen: Nondistended.   Musculoskeletal/Extremities: Age-related degenerative joint disease.  Bilateral trace to 1+ pedal edema.  Integument: Remnants of right antecubital rash.  Cognitive/Psychiatric: Alert and oriented x4.  Affect is euthymic.    DIAGNOSTICS:   No results found for this or any previous visit (from the past 240 hour(s)).     Last Comprehensive Metabolic " Panel:  Sodium   Date Value Ref Range Status   03/07/2022 139 136 - 145 mmol/L Final   10/24/2017 139 133 - 144 mmol/L Final     Potassium   Date Value Ref Range Status   03/07/2022 3.8 3.5 - 5.0 mmol/L Final   10/24/2017 3.9 3.4 - 5.3 mmol/L Final     Chloride   Date Value Ref Range Status   03/07/2022 100 98 - 107 mmol/L Final   10/24/2017 101 94 - 109 mmol/L Final     Carbon Dioxide   Date Value Ref Range Status   10/24/2017 28 20 - 32 mmol/L Final     Carbon Dioxide (CO2)   Date Value Ref Range Status   03/07/2022 25 22 - 31 mmol/L Final     Anion Gap   Date Value Ref Range Status   03/07/2022 14 5 - 18 mmol/L Final   10/24/2017 10 3 - 14 mmol/L Final     Glucose   Date Value Ref Range Status   03/07/2022 282 (H) 70 - 125 mg/dL Final   10/24/2017 116 (H) 70 - 99 mg/dL Final     GLUCOSE BY METER POCT   Date Value Ref Range Status   03/13/2022 176 (H) 70 - 99 mg/dL Final     Urea Nitrogen   Date Value Ref Range Status   03/07/2022 23 8 - 28 mg/dL Final   10/24/2017 18 7 - 30 mg/dL Final     Creatinine   Date Value Ref Range Status   03/07/2022 1.07 0.60 - 1.10 mg/dL Final   10/24/2017 0.84 0.52 - 1.04 mg/dL Final     GFR Estimate   Date Value Ref Range Status   03/07/2022 53 (L) >60 mL/min/1.73m2 Final     Comment:     Effective December 21, 2021 eGFRcr in adults is calculated using the 2021 CKD-EPI creatinine equation which includes age and gender (Teena et al., NEJM, DOI: 10.1056/PQQFcy8537931)   12/29/2020 >60 >60 mL/min/1.73m2 Final   10/24/2017 66 >60 mL/min/1.7m2 Final     Comment:     Non  GFR Calc     Calcium   Date Value Ref Range Status   03/07/2022 9.0 8.5 - 10.5 mg/dL Final   10/24/2017 9.5 8.5 - 10.1 mg/dL Final     Bilirubin Total   Date Value Ref Range Status   07/19/2021 0.7 0.0 - 1.0 mg/dL Final   02/14/2017 0.4 0.2 - 1.3 mg/dL Final     Alkaline Phosphatase   Date Value Ref Range Status   07/19/2021 159 (H) 45 - 120 U/L Final   02/14/2017 85 40 - 150 U/L Final     ALT   Date Value  Ref Range Status   07/19/2021 52 (H) 0 - 45 U/L Final   02/14/2017 26 0 - 50 U/L Final     AST   Date Value Ref Range Status   07/19/2021 28 0 - 40 U/L Final   02/14/2017 25 0 - 45 U/L Final       Lab Results   Component Value Date    WBC 8.9 03/11/2022    WBC 8.9 04/12/2016     Lab Results   Component Value Date    RBC 3.86 03/11/2022    RBC 3.99 04/12/2016     Lab Results   Component Value Date    HGB 8.7 03/11/2022    HGB 12.2 10/24/2017     Lab Results   Component Value Date    HCT 32.4 03/11/2022    HCT 36.0 04/12/2016     Lab Results   Component Value Date    MCV 84 03/11/2022    MCV 90 04/12/2016     Lab Results   Component Value Date    MCH 22.5 03/11/2022    MCH 26.1 04/12/2016     Lab Results   Component Value Date    MCHC 26.9 03/11/2022    MCHC 28.9 04/12/2016     Lab Results   Component Value Date    RDW 16.8 03/11/2022    RDW 15.8 04/12/2016     Lab Results   Component Value Date     03/11/2022     04/12/2016       ASSESSMENT/Plan:      ICD-10-CM    1. Pneumonia of right lower lobe due to infectious organism  J18.9    2. Severe sepsis (H)  A41.9     R65.20    3. Neural foraminal stenosis of cervical spine  M48.02    4. Paravertebral mass  R22.2    5. Multilevel spondylosis  M47.819    6. Type 2 diabetes mellitus with hyperglycemia, unspecified whether long term insulin use (H)  E11.65    7. Spasm of back muscles  M62.830    8. Bilateral hearing loss, unspecified hearing loss type  H91.93    9. Meniere's disease (cochlear hydrops) of both ears - on prednisone and triamterene hydrocholorothiazide  H81.03    10. Anxiety and depression  F41.9     F32.A    11. Essential hypertension  I10    12. Mixed hyperlipidemia  E78.2    13. Slow transit constipation  K59.01        CHANGES:    1.  Discontinue as needed diazepam and, instead, schedule 0.5 mg twice daily.    CARE PLAN:    The care plan, medications, vital signs, orders, and nursing notes have been reviewed, and all orders signed. Changes to  care plan, if any, as noted. Otherwise, continue current plan of care.  Total time spent in this encounter was 42 minutes, with greater than 50% spent in counseling and coordination of care that included going over this patient's long list of diagnoses followed by all of her medications.    The above has been created using voice recognition software. Please be aware that this may unintentionally  produce inaccuracies and/or nonsensical sentences.      Electronically signed by: JUAN ANTONIO Ching CNP        Sincerely,        JUAN ANTONIO Ching CNP

## 2022-03-18 ENCOUNTER — TRANSITIONAL CARE UNIT VISIT (OUTPATIENT)
Dept: GERIATRICS | Facility: CLINIC | Age: 82
End: 2022-03-18
Payer: MEDICARE

## 2022-03-18 VITALS
RESPIRATION RATE: 18 BRPM | DIASTOLIC BLOOD PRESSURE: 74 MMHG | OXYGEN SATURATION: 91 % | TEMPERATURE: 98.3 F | WEIGHT: 200.4 LBS | SYSTOLIC BLOOD PRESSURE: 149 MMHG | BODY MASS INDEX: 33.39 KG/M2 | HEIGHT: 65 IN | HEART RATE: 86 BPM

## 2022-03-18 DIAGNOSIS — J18.9 PNEUMONIA OF RIGHT LOWER LOBE DUE TO INFECTIOUS ORGANISM: ICD-10-CM

## 2022-03-18 DIAGNOSIS — M05.742 RHEUMATOID ARTHRITIS INVOLVING BOTH HANDS WITH POSITIVE RHEUMATOID FACTOR (H): ICD-10-CM

## 2022-03-18 DIAGNOSIS — F31.76 BIPOLAR DISORDER, IN FULL REMISSION, MOST RECENT EPISODE DEPRESSED (H): ICD-10-CM

## 2022-03-18 DIAGNOSIS — M05.741 RHEUMATOID ARTHRITIS INVOLVING BOTH HANDS WITH POSITIVE RHEUMATOID FACTOR (H): ICD-10-CM

## 2022-03-18 DIAGNOSIS — N18.30 STAGE 3 CHRONIC KIDNEY DISEASE, UNSPECIFIED WHETHER STAGE 3A OR 3B CKD (H): ICD-10-CM

## 2022-03-18 DIAGNOSIS — G89.29 CHRONIC BILATERAL BACK PAIN, UNSPECIFIED BACK LOCATION: ICD-10-CM

## 2022-03-18 DIAGNOSIS — I12.9 BENIGN HYPERTENSIVE KIDNEY DISEASE WITH CHRONIC KIDNEY DISEASE STAGE I THROUGH STAGE IV, OR UNSPECIFIED: ICD-10-CM

## 2022-03-18 DIAGNOSIS — R19.5 LOOSE STOOLS: Primary | ICD-10-CM

## 2022-03-18 DIAGNOSIS — M54.9 CHRONIC BILATERAL BACK PAIN, UNSPECIFIED BACK LOCATION: ICD-10-CM

## 2022-03-18 PROBLEM — I82.402 ACUTE DEEP VEIN THROMBOSIS (DVT) OF LEFT LOWER EXTREMITY, UNSPECIFIED VEIN (H): Status: RESOLVED | Noted: 2018-01-23 | Resolved: 2022-03-18

## 2022-03-18 PROCEDURE — 99309 SBSQ NF CARE MODERATE MDM 30: CPT | Performed by: INTERNAL MEDICINE

## 2022-03-18 NOTE — PROGRESS NOTES
[facility] GERIATRICS  TCU Episodic Visit   March 18, 2022      Chief Complaint   Patient presents with     Nursing Home Acute     PNA, loose stools, DM II, back pain       HPI:    Tonya Yang is a 81 year old  (1940), who is being seen today for an episodic care visit at [facility] where she is doing rehab after a hospitalzation at [facility] with sepsis secondary to a RLL PNA. She had an epidural steroid injection during that hospitalization (3/11) for acute on chronic back pain.     Today, Ms. Yang is seen with her son Davie. She only has 15% hearing and so it was difficult to communicate as she relies on lip reading and I was in a mask. Her son was able to help and we did some writing on the erasable board.     Her son is worried that the PNA could be back because she seemed more weak in her legs walking yesterday. She is worried the PNA isn't gone because her voice seems hoarse. Both are concerned her IBS is flared due to loose stools. Some concern it was metformin as her PCP has adjusted the dose over the years to minimize GI side effects, but discussed more likely from the antibiotics. She's been eating Activia and discussed a daily probiotic is an option. Reviewed sugars and the midday are higher than morning, but wouldn't make adjustments as overall is OK. She is working with therapies and talked with son that there will be a care conference with SW, nursing and therapy once she's been in the TCU a bit longer    ALLERGIES: Propofol and Shellfish allergy    Past Medical, Surgical, Family and Social History reviewed and updated in EPIC.    MEDICATIONS  Current Outpatient Medications   Medication Sig Dispense Refill     ACE/ARB NOT PRESCRIBED, INTENTIONAL, 1 each continuous prn ACE & ARB not prescribed due to CKD (Chronic Kidney Disease)       acetaminophen (TYLENOL) 325 MG tablet Take 1-2 tablets (325-650 mg) by mouth every 6 hours as needed for mild pain       amLODIPine (NORVASC) 10 MG  tablet Take 1 tablet (10 mg) by mouth daily       atorvastatin (LIPITOR) 10 MG tablet Take 10 mg by mouth At Bedtime       carvedilol (COREG) 3.125 MG tablet Take 1 tablet (3.125 mg) by mouth 2 times daily (with meals)       cyanocobalamin 1000 MCG SUBL Place 1,000 mcg under the tongue daily       diazepam (VALIUM) 1 MG/ML solution Take 0.5 mLs (0.5 mg) by mouth 2 times daily as needed for agitation, anxiety or muscle spasms 3 mL 0     DULoxetine (CYMBALTA) 60 MG EC capsule TAKE 1 CAPSULE (60 MG) BY MOUTH DAILY 90 capsule 1     fluconazole (DIFLUCAN) 150 MG tablet Take 150 mg by mouth See Admin Instructions One dose prn yeast infection        gabapentin (NEURONTIN) 100 MG capsule Take 2 capsules (200 mg) by mouth 2 times daily 2 capsule 0     glipiZIDE (GLUCOTROL) 10 MG tablet Take 1 tablet (10 mg) by mouth 2 times daily (before meals)       guaiFENesin-dextromethorphan (ROBITUSSIN DM) 100-10 MG/5ML syrup Take 10 mLs by mouth every 4 hours as needed for cough or congestion (productive cough) 118 mL 0     JARDIANCE 25 MG TABS tablet Take 25 mg by mouth daily        lamoTRIgine (LAMICTAL) 100 MG tablet Take 100 mg by mouth 2 times daily       lidocaine (LMX4) 4 % external cream Apply topically 4 times daily as needed for mild pain or moderate pain Apply to painful areas, avoid red or open skin, avoid heat over ointment       metFORMIN (GLUCOPHAGE) 500 MG tablet Take 0.5 tablets (250 mg) by mouth 2 times daily (with meals) 15 tablet 0     methocarbamol (ROBAXIN) 500 MG tablet Take 1 tablet (500 mg) by mouth 4 times daily 56 tablet 0     metoclopramide (REGLAN) 10 MG tablet Take 10 mg by mouth 3 times daily as needed Before meals as needed.       nystatin (MYCOSTATIN) 366379 UNIT/GM external powder Apply topically 2 times daily as needed Breast rash       omeprazole (PRILOSEC) 20 MG DR capsule Take 20 mg by mouth daily        oxybutynin ER (DITROPAN-XL) 10 MG 24 hr tablet Take 10 mg by mouth At Bedtime         "oxyCODONE-acetaminophen (PERCOCET) 5-325 MG tablet Take 1 tablet by mouth every 4 hours as needed for severe pain 30 tablet 0     predniSONE (DELTASONE) 1 MG tablet Take 6 mg by mouth daily 1mg tab and 5mg tab       triamcinolone (KENALOG) 0.1 % external cream Apply topically 2 times daily as needed        triamterene-HCTZ (MAXZIDE-25) 37.5-25 MG tablet Take 1 tablet by mouth daily       vitamin D3 (CHOLECALCIFEROL) 50 mcg (2000 units) tablet Take 3 tablets by mouth daily       blood glucose (ACCU-CHEK FASTCLIX) lancing device FOR TESTING ONCE DAILY. DX  E11.9 TYPE 2 DIABETES       blood glucose (CONTOUR TEST) test strip TESTING EVERY DAY DX  E11.9       CONTOUR NEXT TEST test strip TESTING EVERY DAY DX  E11.9       order for DME Equipment being ordered: wrist brace 1 Device 0     Medications reviewed:  Medications reconciled to facility chart and changes were made to reflect current medications as identified as above med list. Below are the changes that were made:   Medications stopped since last EPIC medication reconciliation:   Medications Discontinued During This Encounter   Medication Reason     Barberry-Oreg Grape-Goldenseal (BERBERINE COMPLEX PO)      amoxicillin-clavulanate (AUGMENTIN) 875-125 MG tablet      Medications started since last [facility] medication reconciliation:  No orders of the defined types were placed in this encounter.      REVIEW OF SYSTEMS:  4 point ROS neg other than the symptoms noted above in the HPI.    PHYSICAL EXAM:  BP (!) 149/74   Pulse 86   Temp 98.3  F (36.8  C)   Resp 18   Ht 1.651 m (5' 5\")   Wt 90.9 kg (200 lb 6.4 oz)   LMP  (LMP Unknown)   SpO2 91%   BMI 33.35 kg/m    Gen: sitting on the side of the bed, alert, cooperative and in no acute distress  HEENT: hard of hearing   Card: RRR, S1, S2, no murmurs  Resp: lungs clear to auscultation bilaterally, no crackles or wheezes and moving good air  Ext: no LE edema  Neuro: CX II-XII grossly in tact; ROM in all four extremities " grossly in tact    LABS & IMAGING  Recent Labs and Imaging:  Reviewed as per Epic    ASSESSMENT/PLAN:    Loose Stools  Patient and son concerned is IBS. I think more likely is from antibiotics.   -- she is eating Activia yogurt; discussed probiotic capsule - son will talk with her about it  -- discussed that if becoming more frequent or large volume liquid we would want to check for C Diff  -- nutrition saw her yesterday and is following    RLL PNA, Resolved  Afebrile. No hypoxia. Lungs clear today on my exam. Completed course of Augmentin  -- follow clinically     DM, Type II   Hgb A1c 8.8 on 3/8, improved from 10.8 in July.  Sugars 160s-190s (am), 260s-290s (noon) and 150s-160s (hs).   -- empagliflozin 25 mg daily, glipizide 10 mg daily, metformin 250 mg BID  -- follow sugars; discussed the higher mid-day are OK, her fasting are good as are her bedtime sugars    Chronic Back Pain  Rheumatoid Arthritis  Follows with rheum. Had an epidural on 3/11.   -- APAP 325-650 mg q6h PRN, diazepam 0.5 mg BID PRN for spasms, duloxetine 60 mg daily, gabapentin 200 mg BID, lamotrigine 100 mg BID, methocarbamol 500 mg QID, Percocet 5-315 mg 1 tab q4h PRN and prednisone 6 mg daily  -- PT/OT    HTN  SBPs 130s-150s  -- amlodipine 10 mg daily, carvedilol 3.125 mg BID and triamterene-hydrochlorothiazide 37.5-25 mg daily    -- follow BPs and adjust medications as needed    Bipolar Disorder  By chart review. Her hearing loss compounds any anxiety  -- diazepam 0.5 mg BID PRN for anxiety, duloxetine 60 mg daily and lamotrigine 100 mg BID  -- ongoing supportive cares    Paravertebral Soft Tissue Mass   Incidental Finding  -- per discharge summary - family is aware and rec is for repeat imaging in 6 mos    CKD, Stage III  Baseline Cr in normal range. Cr 1.07 on 3/7  -- avoid nephrotoxic meds  -- periodic BMP    Physical Deconditioning  In setting of hospitalization and underlying medical conditions  -- ongoing PT/OT      Electronically  signed by  Caitlin Silverman MD

## 2022-03-18 NOTE — LETTER
3/18/2022        RE: Tonya Yang  [address]  [place] [address]        [facility] GERIATRICS  TCU Episodic Visit   March 18, 2022      Chief Complaint   Patient presents with     Nursing Home Acute     PNA, loose stools, DM II, back pain       HPI:    Tonya Yang is a 81 year old  (1940), who is being seen today for an episodic care visit at [facility] where she is doing rehab after a hospitalzation at [facility] with sepsis secondary to a RLL PNA. She had an epidural steroid injection during that hospitalization (3/11) for acute on chronic back pain.     Today, Ms. Yang is seen with her son Davie. She only has 15% hearing and so it was difficult to communicate as she relies on lip reading and I was in a mask. Her son was able to help and we did some writing on the erasable board.     Her son is worried that the PNA could be back because she seemed more weak in her legs walking yesterday. She is worried the PNA isn't gone because her voice seems hoarse. Both are concerned her IBS is flared due to loose stools. Some concern it was metformin as her PCP has adjusted the dose over the years to minimize GI side effects, but discussed more likely from the antibiotics. She's been eating Activia and discussed a daily probiotic is an option. Reviewed sugars and the midday are higher than morning, but wouldn't make adjustments as overall is OK. She is working with therapies and talked with son that there will be a care conference with SW, nursing and therapy once she's been in the TCU a bit longer    ALLERGIES: Propofol and Shellfish allergy    Past Medical, Surgical, Family and Social History reviewed and updated in EPIC.    MEDICATIONS  Current Outpatient Medications   Medication Sig Dispense Refill     ACE/ARB NOT PRESCRIBED, INTENTIONAL, 1 each continuous prn ACE & ARB not prescribed due to CKD (Chronic Kidney Disease)       acetaminophen (TYLENOL) 325 MG tablet Take 1-2 tablets  (325-650 mg) by mouth every 6 hours as needed for mild pain       amLODIPine (NORVASC) 10 MG tablet Take 1 tablet (10 mg) by mouth daily       atorvastatin (LIPITOR) 10 MG tablet Take 10 mg by mouth At Bedtime       carvedilol (COREG) 3.125 MG tablet Take 1 tablet (3.125 mg) by mouth 2 times daily (with meals)       cyanocobalamin 1000 MCG SUBL Place 1,000 mcg under the tongue daily       diazepam (VALIUM) 1 MG/ML solution Take 0.5 mLs (0.5 mg) by mouth 2 times daily as needed for agitation, anxiety or muscle spasms 3 mL 0     DULoxetine (CYMBALTA) 60 MG EC capsule TAKE 1 CAPSULE (60 MG) BY MOUTH DAILY 90 capsule 1     fluconazole (DIFLUCAN) 150 MG tablet Take 150 mg by mouth See Admin Instructions One dose prn yeast infection        gabapentin (NEURONTIN) 100 MG capsule Take 2 capsules (200 mg) by mouth 2 times daily 2 capsule 0     glipiZIDE (GLUCOTROL) 10 MG tablet Take 1 tablet (10 mg) by mouth 2 times daily (before meals)       guaiFENesin-dextromethorphan (ROBITUSSIN DM) 100-10 MG/5ML syrup Take 10 mLs by mouth every 4 hours as needed for cough or congestion (productive cough) 118 mL 0     JARDIANCE 25 MG TABS tablet Take 25 mg by mouth daily        lamoTRIgine (LAMICTAL) 100 MG tablet Take 100 mg by mouth 2 times daily       lidocaine (LMX4) 4 % external cream Apply topically 4 times daily as needed for mild pain or moderate pain Apply to painful areas, avoid red or open skin, avoid heat over ointment       metFORMIN (GLUCOPHAGE) 500 MG tablet Take 0.5 tablets (250 mg) by mouth 2 times daily (with meals) 15 tablet 0     methocarbamol (ROBAXIN) 500 MG tablet Take 1 tablet (500 mg) by mouth 4 times daily 56 tablet 0     metoclopramide (REGLAN) 10 MG tablet Take 10 mg by mouth 3 times daily as needed Before meals as needed.       nystatin (MYCOSTATIN) 338784 UNIT/GM external powder Apply topically 2 times daily as needed Breast rash       omeprazole (PRILOSEC) 20 MG DR capsule Take 20 mg by mouth daily         "oxybutynin ER (DITROPAN-XL) 10 MG 24 hr tablet Take 10 mg by mouth At Bedtime        oxyCODONE-acetaminophen (PERCOCET) 5-325 MG tablet Take 1 tablet by mouth every 4 hours as needed for severe pain 30 tablet 0     predniSONE (DELTASONE) 1 MG tablet Take 6 mg by mouth daily 1mg tab and 5mg tab       triamcinolone (KENALOG) 0.1 % external cream Apply topically 2 times daily as needed        triamterene-HCTZ (MAXZIDE-25) 37.5-25 MG tablet Take 1 tablet by mouth daily       vitamin D3 (CHOLECALCIFEROL) 50 mcg (2000 units) tablet Take 3 tablets by mouth daily       blood glucose (ACCU-CHEK FASTCLIX) lancing device FOR TESTING ONCE DAILY. DX  E11.9 TYPE 2 DIABETES       blood glucose (CONTOUR TEST) test strip TESTING EVERY DAY DX  E11.9       CONTOUR NEXT TEST test strip TESTING EVERY DAY DX  E11.9       order for DME Equipment being ordered: wrist brace 1 Device 0     Medications reviewed:  Medications reconciled to facility chart and changes were made to reflect current medications as identified as above med list. Below are the changes that were made:   Medications stopped since last EPIC medication reconciliation:   Medications Discontinued During This Encounter   Medication Reason     Barberry-Oreg Grape-Goldenseal (BERBERINE COMPLEX PO)      amoxicillin-clavulanate (AUGMENTIN) 875-125 MG tablet      Medications started since last [facility] medication reconciliation:  No orders of the defined types were placed in this encounter.      REVIEW OF SYSTEMS:  4 point ROS neg other than the symptoms noted above in the HPI.    PHYSICAL EXAM:  BP (!) 149/74   Pulse 86   Temp 98.3  F (36.8  C)   Resp 18   Ht 1.651 m (5' 5\")   Wt 90.9 kg (200 lb 6.4 oz)   LMP  (LMP Unknown)   SpO2 91%   BMI 33.35 kg/m    Gen: sitting on the side of the bed, alert, cooperative and in no acute distress  HEENT: hard of hearing   Card: RRR, S1, S2, no murmurs  Resp: lungs clear to auscultation bilaterally, no crackles or wheezes and moving good " air  Ext: no LE edema  Neuro: CX II-XII grossly in tact; ROM in all four extremities grossly in tact    LABS & IMAGING  Recent Labs and Imaging:  Reviewed as per Epic    ASSESSMENT/PLAN:    Loose Stools  Patient and son concerned is IBS. I think more likely is from antibiotics.   -- she is eating Activia yogurt; discussed probiotic capsule - son will talk with her about it  -- discussed that if becoming more frequent or large volume liquid we would want to check for C Diff  -- nutrition saw her yesterday and is following    RLL PNA, Resolved  Afebrile. No hypoxia. Lungs clear today on my exam. Completed course of Augmentin  -- follow clinically     DM, Type II   Hgb A1c 8.8 on 3/8, improved from 10.8 in July.  Sugars 160s-190s (am), 260s-290s (noon) and 150s-160s (hs).   -- empagliflozin 25 mg daily, glipizide 10 mg daily, metformin 250 mg BID  -- follow sugars; discussed the higher mid-day are OK, her fasting are good as are her bedtime sugars    Chronic Back Pain  Rheumatoid Arthritis  Follows with rheum. Had an epidural on 3/11.   -- APAP 325-650 mg q6h PRN, diazepam 0.5 mg BID PRN for spasms, duloxetine 60 mg daily, gabapentin 200 mg BID, lamotrigine 100 mg BID, methocarbamol 500 mg QID, Percocet 5-315 mg 1 tab q4h PRN and prednisone 6 mg daily  -- PT/OT    HTN  SBPs 130s-150s  -- amlodipine 10 mg daily, carvedilol 3.125 mg BID and triamterene-hydrochlorothiazide 37.5-25 mg daily    -- follow BPs and adjust medications as needed    Paravertebral Soft Tissue Mass   Incidental Finding  -- per discharge summary - family is aware and rec is for repeat imaging in 6 mos    Physical Deconditioning  In setting of hospitalization and underlying medical conditions  -- ongoing PT/OT      Electronically signed by  Caitlin Silverman MD                          Sincerely,        Caitlin Silverman MD

## 2022-03-21 DIAGNOSIS — G89.29 CHRONIC MIDLINE LOW BACK PAIN WITH RIGHT-SIDED SCIATICA: ICD-10-CM

## 2022-03-21 DIAGNOSIS — M54.41 CHRONIC MIDLINE LOW BACK PAIN WITH RIGHT-SIDED SCIATICA: ICD-10-CM

## 2022-03-21 RX ORDER — OXYCODONE AND ACETAMINOPHEN 5; 325 MG/1; MG/1
1 TABLET ORAL EVERY 4 HOURS PRN
Qty: 30 TABLET | Refills: 0 | Status: SHIPPED | OUTPATIENT
Start: 2022-03-21 | End: 2022-03-24

## 2022-03-22 ENCOUNTER — TRANSITIONAL CARE UNIT VISIT (OUTPATIENT)
Dept: GERIATRICS | Facility: CLINIC | Age: 82
End: 2022-03-22
Payer: MEDICARE

## 2022-03-22 VITALS
DIASTOLIC BLOOD PRESSURE: 64 MMHG | BODY MASS INDEX: 31.75 KG/M2 | OXYGEN SATURATION: 91 % | HEIGHT: 65 IN | SYSTOLIC BLOOD PRESSURE: 126 MMHG | RESPIRATION RATE: 18 BRPM | WEIGHT: 190.6 LBS | HEART RATE: 76 BPM | TEMPERATURE: 99.1 F

## 2022-03-22 DIAGNOSIS — G89.29 CHRONIC BILATERAL BACK PAIN, UNSPECIFIED BACK LOCATION: ICD-10-CM

## 2022-03-22 DIAGNOSIS — A41.9 SEVERE SEPSIS (H): ICD-10-CM

## 2022-03-22 DIAGNOSIS — M47.819 MULTILEVEL SPONDYLOSIS: ICD-10-CM

## 2022-03-22 DIAGNOSIS — K59.01 SLOW TRANSIT CONSTIPATION: ICD-10-CM

## 2022-03-22 DIAGNOSIS — R65.20 SEVERE SEPSIS (H): ICD-10-CM

## 2022-03-22 DIAGNOSIS — F41.9 ANXIETY AND DEPRESSION: ICD-10-CM

## 2022-03-22 DIAGNOSIS — F31.76 BIPOLAR 1 DISORDER, DEPRESSED, FULL REMISSION (H): ICD-10-CM

## 2022-03-22 DIAGNOSIS — F32.A ANXIETY AND DEPRESSION: ICD-10-CM

## 2022-03-22 DIAGNOSIS — E11.65 TYPE 2 DIABETES MELLITUS WITH HYPERGLYCEMIA, UNSPECIFIED WHETHER LONG TERM INSULIN USE (H): ICD-10-CM

## 2022-03-22 DIAGNOSIS — H81.03 COCHLEAR HYDROPS OF BOTH EARS: ICD-10-CM

## 2022-03-22 DIAGNOSIS — M48.02 NEURAL FORAMINAL STENOSIS OF CERVICAL SPINE: ICD-10-CM

## 2022-03-22 DIAGNOSIS — H91.93 BILATERAL HEARING LOSS, UNSPECIFIED HEARING LOSS TYPE: ICD-10-CM

## 2022-03-22 DIAGNOSIS — M62.830 SPASM OF BACK MUSCLES: ICD-10-CM

## 2022-03-22 DIAGNOSIS — M54.9 CHRONIC BILATERAL BACK PAIN, UNSPECIFIED BACK LOCATION: ICD-10-CM

## 2022-03-22 DIAGNOSIS — E78.2 MIXED HYPERLIPIDEMIA: ICD-10-CM

## 2022-03-22 DIAGNOSIS — J18.9 PNEUMONIA OF RIGHT LOWER LOBE DUE TO INFECTIOUS ORGANISM: Primary | ICD-10-CM

## 2022-03-22 DIAGNOSIS — R22.2 PARAVERTEBRAL MASS: ICD-10-CM

## 2022-03-22 DIAGNOSIS — I10 ESSENTIAL HYPERTENSION: ICD-10-CM

## 2022-03-22 PROCEDURE — 99309 SBSQ NF CARE MODERATE MDM 30: CPT | Performed by: NURSE PRACTITIONER

## 2022-03-22 NOTE — LETTER
3/22/2022        RE: Tonya Yang  [address]  [place] [address]        [facility]    Name:   Tonya Yang  :   1940  MRN:    [id number]     Facility:   [facility]) [[id number]]   Room: [place] [address]  Code Status: DNR and POLST AVAILABLE - Had been full code in the hospital    DOS:  2022  Previous visit: 03/15/2022 and subsequently seen by Dr. Caitlin Silverman on 2022    PCP:  Ananya Mclean NP     CHIEF COMPLAINT / REASON FOR VISIT:  Chief Complaint   Patient presents with     Clinic Care Coordination - Follow-up     CAP, severe sepsis, acute on chronic back pain      [facility] from 2022 until 2022 (pneumonia, severe sepsis, acute on chronic back pain)      HPI: Tonya is a 81 year old female with a past medical history significant for severe spinal stenosis (multilevel), polyarthralgia, Ménière's disease/cochlear hydrops, diabetes mellitus type 2 (with nephropathy), who presented with acute on chronic back pain that had become worse over the preceding months, and she could no longer ambulate with her cane and became essentially bedbound.  Further work-up in the ER was concerning for lactic acidosis, leukocytosis, and CXR concerning for medial right lower lobe pneumonia.  Treated with a fluid bolus, pain medications, and Zosyn and vancomycin, ultimately admitted for further work-up.    She was found to have leukocytosis and lactic acidosis, and a CXR was concerning for right-sided pneumonia.  She had a cough for about a week with symptoms of regurgitation.  A blood culture was negative.  Diagnosed with CAP and severe sepsis, she was treated with ceftriaxone and azithromycin with a transition to oral Augmentin.    She has had chronic back pain for some time.  Further work-up in the ER was concerning for severe multilevel spinal stenosis with bilateral neuroforaminal stenosis and compression of  bilateral exiting cauda equina nerve roots, left greater than right.  Spine surgery was consulted with ensuing discussion with the patient and her son, and it was recommended that she recover from pneumonia and then reassess the need for surgical intervention.  Later during her stay, she underwent an epidural steroid injection (03/11) with good effect, and it made it possible for her to participate with therapy and ambulate easier.    Incidental finding on MRI of the thoracic spine showed a small, well-circumscribed enhancing lesion along the left anterior lateral paravertebral soft tissue, measuring 1.2 x 1.1 x 1.4 cm.  Nonspecific finding could represent neurofibroma/schwannoma, nerve sheath tumor, metastatic lymph node, or atypical metastatic mass.  Radiologist recommended CT chest for further assessment.  This was ordered by neurosurgery, and it showed a mass in the last anterior paravertebral fat posterior to the descending thoracic aorta, most likely representing lymph node with recommendation for 6 months follow-up CT.    She also has essential hypertension that has required multiple medications, including triamterene hydrochlorothiazide (which she takes for Ménière's disease) and increasing doses of amlodipine.  She was started on carvedilol with good control and was kept on this at discharge.    As noted, she also has Ménière's disease for which, in addition to the triamterene hydrochlorothiazide, she also takes prednisone.  She stated that the condition is not severe and is only problematic if she gets up too fast.    She has a mood disorder for which she receives duloxetine, Lamictal, and as needed lorazepam for anxiety.    Overactive bladder places her on oxybutynin.  Occasional urge incontinence.    Diagnosis of GERD, and she did have symptoms of food stuck in her esophagus.  A swallow study was not concerning for aspiration.  Consider follow-up with GI if symptoms persist or recur and you may want to  "consider esophagogram to evaluate for motility issues.     She developed a right antecubital skin rash, likely secondary to tape.  Orders were given for 0.1% triamcinolone cream.    She has diabetes mellitus type 2 with hyperglycemia.  Her last A1c was 8.8.  She was on insulin while inpatient, and hyperglycemia was felt secondary to a steroid injection, and she continued to improve.  She is adamant against giving herself insulin.  She was ultimately ready for discharge to the TCU.    Suspected chronic kidney disease.  While she was not prescribed an ACE/ARB in the hospital, CKD was listed as the reason.  We do not yet have 3 recent metabolic profile.      CURRENT/RECENT TCU ISSUES    Disposition: Doing well today reading lips.  Staying at a good distance, the mask is removed.  I asked her about her bipolar one disorder and anxiety.  Her response was, \"sometimes I kind of flipped out.  Now, I'm learning to handle it.\"    We talked about her pain today.  She did receive a pain pill before lunch, and when I asked her if it was working, she responded that the pain had escalated, and she would like to receive 1.5 tablets of Percocet, as she claims to have received in the hospital.  She repeated this multiple times during the encounter.  I eventually suggested that we double her methocarbamol from a 500 mg 4 times daily to 1000 mg 4 times daily, at which point she complained that it would make her too lethargic, and we settled on 750 mg (see below).    The patient was discharged in the hospital discharge summary as full code; however, at this juncture she would prefer to be DNR.  She appears to play the role of drama queen during our initial visit and it does a good deal of coughing (although her lungs are clear).  She is extremely loquacious, and it is often difficult to get a word in edge wise.    Pain management: Today, she endorses pain in the neck and shoulders and rates the pain at 10, although she is quite relaxed " "when saying this.  When last seen, she talked about her \"traveling\" spinal stenosis (she had an epidural in the hospital that was ineffective), right-sided pain from the buttocks, and told me she is \"wobbly and weak.\"  She has orders for muscle relaxers (methocarbamol 500 mg 4 times daily) and Percocet (5/325 mg every 4 hours as needed.  We are increasing the methocarbamol to 750 mg 4 times daily.    Anxiety: She is currently experiencing a good deal of anxiety.  She has orders for as needed diazepam (0.5 mg twice daily) and she might very well benefit from scheduling every 12 hours.  She does find this effective, although she told me she really wasn't taking it at home.    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  Still, she does manage to hear well enough to carry on a conversation, occasionally needing to resort to writing things down.    Chronic kidney disease: Not quite sure.  On 03/07, her creatinine was 1.07.    Constipation: She earlier endorsed constipation, describing \"meatballs.\"  Now, she has loose stools.  When seen by Dr. Silverman on 03/18, they did discuss the possibility of giving Tonya a probiotic and, if the stools became worse and more watery, her stool would be tested for C. difficile.    Diabetes mellitus type 2 and antecubital rash: She is getting a steroids for her rash and states that it is raising her blood glucose levels; however, she has been on oral medications at home, recently presenting with an A1c of 8.8 as noted.  She also had some glycosuria in the hospital.  She adamantly refuses to accept any idea of giving herself insulin at home.  Her rash being treated with triamcinolone 0.1% cream is improving.    Contacts: Davie, her son ([phone])      ROS:    Positives: As noted above.  Negatives: No headaches or chest pains, coughing or congestion, nausea or vomiting, dyspnea, dysuria, difficulty chewing or swallowing, or any problems with appetite or " sleep.    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     Hepatic abscess      Hyperlipidemia      Hypertension      Irritable bowel syndrome      Meniere disease      Neuropathy      Noninfectious ileitis      Peritoneal abscess (H)      Spinal stenosis of lumbar region      Steroid long-term use      Vaginitis, atrophic, postmenopausal      Vitamin D deficiency               Family History   Problem Relation Age of Onset     Cerebrovascular Disease Mother      Heart Disease Mother      Heart Disease Father      Heart Disease Brother      Diabetes No family hx of      Coronary Artery Disease No family hx of      Hypertension No family hx of      Hyperlipidemia No family hx of      Breast Cancer No family hx of      Colon Cancer No family hx of      Prostate Cancer No family hx of      Other Cancer No family hx of      Depression No family hx of      Anxiety Disorder No family hx of      Mental Illness No family hx of      Substance Abuse No family hx of      Anesthesia Reaction No family hx of      Asthma No family hx of      Osteoporosis No family hx of      Genetic Disorder No family hx of      Thyroid Disease No family hx of      Obesity No family hx of      Unknown/Adopted No family hx of      Social History     Socioeconomic History     Marital status:      Spouse name: None     Number of children: None     Years of education: None     Highest education level: None   Occupational History     None   Tobacco Use     Smoking status: Former Smoker     Types: Cigarettes     Quit date: 11/15/1987     Years since quittin.3     Smokeless  tobacco: Former User   Substance and Sexual Activity     Alcohol use: Not Currently     Alcohol/week: 0.0 standard drinks     Comment: MALISSA white since 9/131986     Drug use: No     Comment: used marijuanna in the past     Sexual activity: Never     Partners: Male   Other Topics Concern     Parent/sibling w/ CABG, MI or angioplasty before 65F 55M? No   Social History Narrative     None     Social Determinants of Health     Financial Resource Strain: Not on file   Food Insecurity: Not on file   Transportation Needs: Not on file   Physical Activity: Not on file   Stress: Not on file   Social Connections: Not on file   Intimate Partner Violence: Not on file   Housing Stability: Not on file       MEDICATIONS: Reviewed from the MAR, physician orders, and/or earlier progress notes.  Post Discharge Medication Reconciliation Status: medication reconcilation previously completed during another office visit.    Current Outpatient Medications   Medication Sig     ACE/ARB NOT PRESCRIBED, INTENTIONAL, 1 each continuous prn ACE & ARB not prescribed due to CKD (Chronic Kidney Disease)     acetaminophen (TYLENOL) 325 MG tablet Take 1-2 tablets (325-650 mg) by mouth every 6 hours as needed for mild pain     amLODIPine (NORVASC) 10 MG tablet Take 1 tablet (10 mg) by mouth daily     atorvastatin (LIPITOR) 10 MG tablet Take 10 mg by mouth At Bedtime     blood glucose (ACCU-CHEK FASTCLIX) lancing device FOR TESTING ONCE DAILY. DX  E11.9 TYPE 2 DIABETES     blood glucose (CONTOUR TEST) test strip TESTING EVERY DAY DX  E11.9     carvedilol (COREG) 3.125 MG tablet Take 1 tablet (3.125 mg) by mouth 2 times daily (with meals)     CONTOUR NEXT TEST test strip TESTING EVERY DAY DX  E11.9     cyanocobalamin 1000 MCG SUBL Place 1,000 mcg under the tongue daily     diazepam (VALIUM) 1 MG/ML solution Take 0.5 mLs (0.5 mg) by mouth 2 times daily as needed for agitation, anxiety or muscle spasms (Patient taking differently: Take 0.5 mg by mouth every  12 hours )     DULoxetine (CYMBALTA) 60 MG EC capsule TAKE 1 CAPSULE (60 MG) BY MOUTH DAILY     fluconazole (DIFLUCAN) 150 MG tablet Take 150 mg by mouth See Admin Instructions One dose prn yeast infection      gabapentin (NEURONTIN) 100 MG capsule Take 2 capsules (200 mg) by mouth 2 times daily     glipiZIDE (GLUCOTROL) 10 MG tablet Take 1 tablet (10 mg) by mouth 2 times daily (before meals)     guaiFENesin-dextromethorphan (ROBITUSSIN DM) 100-10 MG/5ML syrup Take 10 mLs by mouth every 4 hours as needed for cough or congestion (productive cough)     JARDIANCE 25 MG TABS tablet Take 25 mg by mouth daily      lamoTRIgine (LAMICTAL) 100 MG tablet Take 100 mg by mouth 2 times daily     lidocaine (LMX4) 4 % external cream Apply topically 4 times daily as needed for mild pain or moderate pain Apply to painful areas, avoid red or open skin, avoid heat over ointment     metFORMIN (GLUCOPHAGE) 500 MG tablet Take 0.5 tablets (250 mg) by mouth 2 times daily (with meals)     methocarbamol (ROBAXIN) 500 MG tablet Take 1 tablet (500 mg) by mouth 4 times daily     metoclopramide (REGLAN) 10 MG tablet Take 10 mg by mouth 3 times daily as needed Before meals as needed.     nystatin (MYCOSTATIN) 918950 UNIT/GM external powder Apply topically 2 times daily as needed Breast rash     omeprazole (PRILOSEC) 20 MG DR capsule Take 20 mg by mouth daily      order for DME Equipment being ordered: wrist brace     oxybutynin ER (DITROPAN-XL) 10 MG 24 hr tablet Take 10 mg by mouth At Bedtime      oxyCODONE-acetaminophen (PERCOCET) 5-325 MG tablet Take 1 tablet by mouth 3 times daily. May also take 1 tablet every 4 hours as needed for severe pain. Scheduled times are 6 am, 2 pm, and 10 pm     predniSONE (DELTASONE) 1 MG tablet Take 6 mg by mouth daily 1mg tab and 5mg tab     triamcinolone (KENALOG) 0.1 % external cream Apply topically 2 times daily as needed      triamterene-HCTZ (MAXZIDE-25) 37.5-25 MG tablet Take 1 tablet by mouth daily      "vitamin D3 (CHOLECALCIFEROL) 50 mcg (2000 units) tablet Take 3 tablets by mouth daily     No current facility-administered medications for this visit.     ALLERGIES:   Allergies   Allergen Reactions     Propofol Nausea and Vomiting     Shellfish Allergy      Other reaction(s): Dizziness       DIET: Diabetic, regular texture, thin liquids.    Vitals:    03/22/22 1310   BP: 126/64   Pulse: 76   Resp: 18   Temp: 99.1  F (37.3  C)   SpO2: 91%   Weight: 86.5 kg (190 lb 9.6 oz)   Height: 1.651 m (5' 5\")     Body mass index is 31.72 kg/m .    EXAMINATION:   General: Pleasant, very loquacious elderly female, sitting in the chair but would be better served with a recliner.   Head: Normocephalic and atraumatic.   Eyes: PERRLA, sclerae clear.   ENT: Moist oral mucosa.  Upper dentures and some remaining lower teeth.  Cardiovascular: Regular rate and rhythm with a 2/6 systolic ejection murmur with split S2 at the left sternal border.   Respiratory: Lungs clear to auscultation bilaterally.   Abdomen: Nondistended.   Musculoskeletal/Extremities: Age-related degenerative joint disease.  Bilateral trace to 1+ pedal edema.  Integument: Remnants of right antecubital rash.  Cognitive/Psychiatric: Alert and oriented x4.  Affect is euthymic.    DIAGNOSTICS:   No results found for this or any previous visit (from the past 240 hour(s)).     Last Comprehensive Metabolic Panel:  Sodium   Date Value Ref Range Status   03/07/2022 139 136 - 145 mmol/L Final   10/24/2017 139 133 - 144 mmol/L Final     Potassium   Date Value Ref Range Status   03/07/2022 3.8 3.5 - 5.0 mmol/L Final   10/24/2017 3.9 3.4 - 5.3 mmol/L Final     Chloride   Date Value Ref Range Status   03/07/2022 100 98 - 107 mmol/L Final   10/24/2017 101 94 - 109 mmol/L Final     Carbon Dioxide   Date Value Ref Range Status   10/24/2017 28 20 - 32 mmol/L Final     Carbon Dioxide (CO2)   Date Value Ref Range Status   03/07/2022 25 22 - 31 mmol/L Final     Anion Gap   Date Value Ref " Range Status   03/07/2022 14 5 - 18 mmol/L Final   10/24/2017 10 3 - 14 mmol/L Final     Glucose   Date Value Ref Range Status   03/07/2022 282 (H) 70 - 125 mg/dL Final   10/24/2017 116 (H) 70 - 99 mg/dL Final     GLUCOSE BY METER POCT   Date Value Ref Range Status   03/13/2022 176 (H) 70 - 99 mg/dL Final     Urea Nitrogen   Date Value Ref Range Status   03/07/2022 23 8 - 28 mg/dL Final   10/24/2017 18 7 - 30 mg/dL Final     Creatinine   Date Value Ref Range Status   03/07/2022 1.07 0.60 - 1.10 mg/dL Final   10/24/2017 0.84 0.52 - 1.04 mg/dL Final     GFR Estimate   Date Value Ref Range Status   03/07/2022 53 (L) >60 mL/min/1.73m2 Final     Comment:     Effective December 21, 2021 eGFRcr in adults is calculated using the 2021 CKD-EPI creatinine equation which includes age and gender (Teena et al., NEJ, DOI: 10.1056/ZZKHye2988183)   12/29/2020 >60 >60 mL/min/1.73m2 Final   10/24/2017 66 >60 mL/min/1.7m2 Final     Comment:     Non  GFR Calc     Calcium   Date Value Ref Range Status   03/07/2022 9.0 8.5 - 10.5 mg/dL Final   10/24/2017 9.5 8.5 - 10.1 mg/dL Final     Bilirubin Total   Date Value Ref Range Status   07/19/2021 0.7 0.0 - 1.0 mg/dL Final   02/14/2017 0.4 0.2 - 1.3 mg/dL Final     Alkaline Phosphatase   Date Value Ref Range Status   07/19/2021 159 (H) 45 - 120 U/L Final   02/14/2017 85 40 - 150 U/L Final     ALT   Date Value Ref Range Status   07/19/2021 52 (H) 0 - 45 U/L Final   02/14/2017 26 0 - 50 U/L Final     AST   Date Value Ref Range Status   07/19/2021 28 0 - 40 U/L Final   02/14/2017 25 0 - 45 U/L Final       Lab Results   Component Value Date    WBC 8.9 03/11/2022    WBC 8.9 04/12/2016     Lab Results   Component Value Date    RBC 3.86 03/11/2022    RBC 3.99 04/12/2016     Lab Results   Component Value Date    HGB 8.7 03/11/2022    HGB 12.2 10/24/2017     Lab Results   Component Value Date    HCT 32.4 03/11/2022    HCT 36.0 04/12/2016     Lab Results   Component Value Date    MCV 84  03/11/2022    MCV 90 04/12/2016     Lab Results   Component Value Date    MCH 22.5 03/11/2022    MCH 26.1 04/12/2016     Lab Results   Component Value Date    MCHC 26.9 03/11/2022    MCHC 28.9 04/12/2016     Lab Results   Component Value Date    RDW 16.8 03/11/2022    RDW 15.8 04/12/2016     Lab Results   Component Value Date     03/11/2022     04/12/2016       ASSESSMENT/Plan:      ICD-10-CM    1. Pneumonia of right lower lobe due to infectious organism  J18.9    2. Severe sepsis (H)  A41.9     R65.20    3. Neural foraminal stenosis of cervical spine, bilateral  M48.02    4. Paravertebral mass  R22.2    5. Multilevel spondylosis  M47.819    6. Type 2 diabetes mellitus with hyperglycemia, unspecified whether long term insulin use (H)  E11.65    7. Spasm of back muscles  M62.830    8. Bilateral hearing loss, unspecified hearing loss type  H91.93    9. Bipolar 1 disorder, depressed, full remission (H)  F31.76    10. Meniere's disease (cochlear hydrops) of both ears - on prednisone and triamterene hydrocholorothiazide  H81.03    11. Anxiety and depression  F41.9     F32.A    12. Essential hypertension  I10    13. Chronic bilateral back pain, unspecified back location  M54.9     G89.29    14. Mixed hyperlipidemia  E78.2    15. Slow transit constipation  K59.01        CHANGES:    1.  Increase methocarbamol from 500 mg 4 times daily to 750 mg 4 times daily.  Will reassess at the next visit.    CARE PLAN:    The care plan, medications, vital signs, orders, and nursing notes have been reviewed, and all orders signed. Changes to care plan, if any, as noted. Otherwise, continue current plan of care.      The above has been created using voice recognition software. Please be aware that this may unintentionally  produce inaccuracies and/or nonsensical sentences.      Electronically signed by: JUAN ANTONIO Ching CNP        Sincerely,        JUAN ANTONIO Ching CNP

## 2022-03-24 DIAGNOSIS — M54.41 CHRONIC MIDLINE LOW BACK PAIN WITH RIGHT-SIDED SCIATICA: ICD-10-CM

## 2022-03-24 DIAGNOSIS — G89.29 CHRONIC MIDLINE LOW BACK PAIN WITH RIGHT-SIDED SCIATICA: ICD-10-CM

## 2022-03-24 PROBLEM — M48.02 NEURAL FORAMINAL STENOSIS OF CERVICAL SPINE: Status: ACTIVE | Noted: 2022-03-24

## 2022-03-24 PROBLEM — F41.9 ANXIETY AND DEPRESSION: Status: ACTIVE | Noted: 2022-03-24

## 2022-03-24 PROBLEM — K59.01 SLOW TRANSIT CONSTIPATION: Status: ACTIVE | Noted: 2022-03-24

## 2022-03-24 PROBLEM — F32.A ANXIETY AND DEPRESSION: Status: ACTIVE | Noted: 2022-03-24

## 2022-03-24 PROBLEM — M62.830 SPASM OF BACK MUSCLES: Status: ACTIVE | Noted: 2022-03-24

## 2022-03-24 PROBLEM — M47.819 MULTILEVEL SPONDYLOSIS: Status: ACTIVE | Noted: 2022-03-24

## 2022-03-24 PROBLEM — H91.93 BILATERAL HEARING LOSS, UNSPECIFIED HEARING LOSS TYPE: Status: ACTIVE | Noted: 2022-03-24

## 2022-03-24 RX ORDER — OXYCODONE AND ACETAMINOPHEN 5; 325 MG/1; MG/1
TABLET ORAL
Qty: 45 TABLET | Refills: 0 | Status: SHIPPED | OUTPATIENT
Start: 2022-03-24 | End: 2022-04-04 | Stop reason: DRUGHIGH

## 2022-03-24 NOTE — PROGRESS NOTES
[facility]    Name:   Tonya Yang  :   1940  MRN:    [id number]     Facility:   [facility] ([facility]) [[id number]]   Room: [facility] / [address]  Code Status: DNR and POLST AVAILABLE - Had been full code in the hospital    DOS:  03/15/2022  Previous visit:  N/A    PCP:  Ananya Mclean NP     CHIEF COMPLAINT / REASON FOR VISIT:  Chief Complaint   Patient presents with     Hospital F/U     NEW ADMISSION: CAP, severe sepsis, acute on chronic back pain      [facility] from 2022 until 2022 (pneumonia, severe sepsis, acute on chronic back pain)      HPI: Tonya is a 81 year old female with a past medical history significant for severe spinal stenosis (multilevel), polyarthralgia, Ménière's disease/cochlear hydrops, diabetes mellitus type 2 (with nephropathy), who presented with acute on chronic back pain that had become worse over the preceding months, and she could no longer ambulate with her cane and became essentially bedbound.  Further work-up in the ER was concerning for lactic acidosis, leukocytosis, and CXR concerning for medial right lower lobe pneumonia.  Treated with a fluid bolus, pain medications, and Zosyn and vancomycin, ultimately admitted for further work-up.    She was found to have leukocytosis and lactic acidosis, and a CXR was concerning for right-sided pneumonia.  She had a cough for about a week with symptoms of regurgitation.  A blood culture was negative.  Diagnosed with CAP and severe sepsis, she was treated with ceftriaxone and azithromycin with a transition to oral Augmentin.    She has had chronic back pain for some time.  Further work-up in the ER was concerning for severe multilevel spinal stenosis with bilateral neuroforaminal stenosis and compression of bilateral exiting cauda equina nerve roots, left greater than right.  Spine surgery was consulted with ensuing discussion with the patient and her son, and it was  recommended that she recover from pneumonia and then reassess the need for surgical intervention.  Later during her stay, she underwent an epidural steroid injection (03/11) with good effect, and it made it possible for her to participate with therapy and ambulate easier.    Incidental finding on MRI of the thoracic spine showed a small, well-circumscribed enhancing lesion along the left anterior lateral paravertebral soft tissue, measuring 1.2 x 1.1 x 1.4 cm.  Nonspecific finding could represent neurofibroma/schwannoma, nerve sheath tumor, metastatic lymph node, or atypical metastatic mass.  Radiologist recommended CT chest for further assessment.  This was ordered by neurosurgery, and it showed a mass in the last anterior paravertebral fat posterior to the descending thoracic aorta, most likely representing lymph node with recommendation for 6 months follow-up CT.    She also has essential hypertension that has required multiple medications, including triamterene hydrochlorothiazide (which she takes for Ménière's disease) and increasing doses of amlodipine.  She was started on carvedilol with good control and was kept on this at discharge.    As noted, she also has Ménière's disease for which, in addition to the triamterene hydrochlorothiazide, she also takes prednisone.  She stated that the condition is not severe and is only problematic if she gets up too fast.    She has a mood disorder for which she receives duloxetine, Lamictal, and as needed lorazepam for anxiety.    Overactive bladder places her on oxybutynin.  Occasional urge incontinence.    Diagnosis of GERD, and she did have symptoms of food stuck in her esophagus.  A swallow study was not concerning for aspiration.  Consider follow-up with GI if symptoms persist or recur and you may want to consider esophagogram to evaluate for motility issues.     She developed a right antecubital skin rash, likely secondary to tape.  Orders were given for 0.1%  "triamcinolone cream.    She has diabetes mellitus type 2 with hyperglycemia.  Her last A1c was 8.8.  She was on insulin while inpatient, and hyperglycemia was felt secondary to a steroid injection, and she continued to improve.  She is adamant against giving herself insulin.  She was ultimately ready for discharge to the TCU.    Suspected chronic kidney disease.  While she was not prescribed an ACE/ARB in the hospital, CKD was listed as the reason.  We do not yet have 3 recent metabolic profile.      CURRENT/RECENT TCU ISSUES    Disposition: The patient was discharged in the hospital discharge summary as full code; however, at this juncture she would prefer to be DNR.  She appears to play the role of drama queen during our initial visit and it does a good deal of coughing (although her lungs are clear).  She is very loquacious, wants eagerly to go home.      Pain management: She talks about her \"traveling\" spinal stenosis (she had an epidural in the hospital that was ineffective), right-sided pain from the buttocks, and tells me she is \"wobbly and weak.\"  She has orders for muscle relaxers (methocarbamol 500 mg 4 times daily) and Percocet (5/325 mg every 4 hours as needed.    Anxiety: She is currently experiencing a good deal of anxiety.  She has orders for as needed diazepam (0.5 mg twice daily) and she might very well benefit from scheduling every 12 hours.  She tells me she really wasn't taking it at home.    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  Still, she does manage to hear well enough to carry on a conversation, occasionally needing to resort to writing things down.    Constipation: She does endorse constipation, describing \"meatballs.\"  As far as I know, she is not getting sufficient bowel medications to alleviate this.    Diabetes mellitus type 2 and antecubital rash: She is getting a steroids for her rash and states that it is raising her blood glucose levels; however, she " has been on oral medications at home, recently presenting with an A1c of 8.8 as noted.  She adamantly refuses to accept any idea of giving herself insulin at home..  Her rash being treated with triamcinolone 0.1% cream is improving.    Contacts: Davie, her son ([phone])      ROS:    Positives: As noted above.  Negatives: No headaches or chest pains, coughing or congestion, nausea or vomiting, dyspnea, dysuria, difficulty chewing or swallowing, or any problems with appetite or sleep.    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     Hepatic abscess      Hyperlipidemia      Hypertension      Irritable bowel syndrome      Meniere disease      Neuropathy      Noninfectious ileitis      Peritoneal abscess (H)      Spinal stenosis of lumbar region      Steroid long-term use      Vaginitis, atrophic, postmenopausal      Vitamin D deficiency               Family History   Problem Relation Age of Onset     Cerebrovascular Disease Mother      Heart Disease Mother      Heart Disease Father      Heart Disease Brother      Diabetes No family hx of      Coronary Artery Disease No family hx of      Hypertension No family hx of      Hyperlipidemia No family hx of      Breast Cancer No family hx of      Colon Cancer No family hx of      Prostate Cancer No family hx of      Other Cancer No family hx of      Depression No family hx of      Anxiety Disorder No family hx of      Mental Illness No family hx of      Substance Abuse No family hx of      Anesthesia Reaction No family hx of      Asthma No family hx of      Osteoporosis No family hx of      Genetic  Disorder No family hx of      Thyroid Disease No family hx of      Obesity No family hx of      Unknown/Adopted No family hx of      Social History     Socioeconomic History     Marital status:      Spouse name: None     Number of children: None     Years of education: None     Highest education level: None   Occupational History     None   Tobacco Use     Smoking status: Former Smoker     Types: Cigarettes     Quit date: 11/15/1987     Years since quittin.3     Smokeless tobacco: Former User   Substance and Sexual Activity     Alcohol use: Not Currently     Alcohol/week: 0.0 standard drinks     Comment: MAILSSA white since      Drug use: No     Comment: used marijuanna in the past     Sexual activity: Never     Partners: Male   Other Topics Concern     Parent/sibling w/ CABG, MI or angioplasty before 65F 55M? No   Social History Narrative     None     Social Determinants of Health     Financial Resource Strain: Not on file   Food Insecurity: Not on file   Transportation Needs: Not on file   Physical Activity: Not on file   Stress: Not on file   Social Connections: Not on file   Intimate Partner Violence: Not on file   Housing Stability: Not on file       MEDICATIONS: Reviewed from the MAR, physician orders, and/or earlier progress notes.  Post Discharge Medication Reconciliation Status: discharge medications reconciled and changed, per note/orders.    Current Outpatient Medications   Medication Sig     ACE/ARB NOT PRESCRIBED, INTENTIONAL, 1 each continuous prn ACE & ARB not prescribed due to CKD (Chronic Kidney Disease)     acetaminophen (TYLENOL) 325 MG tablet Take 1-2 tablets (325-650 mg) by mouth every 6 hours as needed for mild pain     amLODIPine (NORVASC) 10 MG tablet Take 1 tablet (10 mg) by mouth daily     atorvastatin (LIPITOR) 10 MG tablet Take 10 mg by mouth At Bedtime     blood glucose (ACCU-CHEK FASTCLIX) lancing device FOR TESTING ONCE DAILY. DX  E11.9 TYPE 2 DIABETES     blood  glucose (CONTOUR TEST) test strip TESTING EVERY DAY DX  E11.9     carvedilol (COREG) 3.125 MG tablet Take 1 tablet (3.125 mg) by mouth 2 times daily (with meals)     CONTOUR NEXT TEST test strip TESTING EVERY DAY DX  E11.9     cyanocobalamin 1000 MCG SUBL Place 1,000 mcg under the tongue daily     diazepam (VALIUM) 1 MG/ML solution Take 0.5 mLs (0.5 mg) by mouth 2 times daily as needed for agitation, anxiety or muscle spasms (Patient taking differently: Take 0.5 mg by mouth every 12 hours )     DULoxetine (CYMBALTA) 60 MG EC capsule TAKE 1 CAPSULE (60 MG) BY MOUTH DAILY     fluconazole (DIFLUCAN) 150 MG tablet Take 150 mg by mouth See Admin Instructions One dose prn yeast infection      gabapentin (NEURONTIN) 100 MG capsule Take 2 capsules (200 mg) by mouth 2 times daily     glipiZIDE (GLUCOTROL) 10 MG tablet Take 1 tablet (10 mg) by mouth 2 times daily (before meals)     guaiFENesin-dextromethorphan (ROBITUSSIN DM) 100-10 MG/5ML syrup Take 10 mLs by mouth every 4 hours as needed for cough or congestion (productive cough)     JARDIANCE 25 MG TABS tablet Take 25 mg by mouth daily      lamoTRIgine (LAMICTAL) 100 MG tablet Take 100 mg by mouth 2 times daily     lidocaine (LMX4) 4 % external cream Apply topically 4 times daily as needed for mild pain or moderate pain Apply to painful areas, avoid red or open skin, avoid heat over ointment     metFORMIN (GLUCOPHAGE) 500 MG tablet Take 0.5 tablets (250 mg) by mouth 2 times daily (with meals)     methocarbamol (ROBAXIN) 500 MG tablet Take 1 tablet (500 mg) by mouth 4 times daily     metoclopramide (REGLAN) 10 MG tablet Take 10 mg by mouth 3 times daily as needed Before meals as needed.     nystatin (MYCOSTATIN) 367523 UNIT/GM external powder Apply topically 2 times daily as needed Breast rash     omeprazole (PRILOSEC) 20 MG DR capsule Take 20 mg by mouth daily      order for DME Equipment being ordered: wrist brace     oxybutynin ER (DITROPAN-XL) 10 MG 24 hr tablet Take 10  "mg by mouth At Bedtime      predniSONE (DELTASONE) 1 MG tablet Take 6 mg by mouth daily 1mg tab and 5mg tab     triamcinolone (KENALOG) 0.1 % external cream Apply topically 2 times daily as needed      triamterene-HCTZ (MAXZIDE-25) 37.5-25 MG tablet Take 1 tablet by mouth daily     vitamin D3 (CHOLECALCIFEROL) 50 mcg (2000 units) tablet Take 3 tablets by mouth daily     oxyCODONE-acetaminophen (PERCOCET) 5-325 MG tablet Take 1 tablet by mouth every 4 hours as needed for severe pain     No current facility-administered medications for this visit.     ALLERGIES:   Allergies   Allergen Reactions     Propofol Nausea and Vomiting     Shellfish Allergy      Other reaction(s): Dizziness       DIET: Diabetic, regular texture, thin liquids.    Vitals:    03/15/22 1009   BP: (!) 150/82   Pulse: 64   Resp: 16   Temp: 97.4  F (36.3  C)   SpO2: 96%   Weight: 90.9 kg (200 lb 6.4 oz)   Height: 1.651 m (5' 5\")     Body mass index is 33.35 kg/m .    EXAMINATION:   General: Pleasant, very loquacious elderly female, sitting in the chair but would be better served with a recliner.  She did a good deal of talking and it was very difficult to get a word in edge wise.  Head: Normocephalic and atraumatic.   Eyes: PERRLA, sclerae clear.   ENT: Moist oral mucosa.  Upper dentures and some remaining lower teeth.  Cardiovascular: Regular rate and rhythm with a 2/6 systolic ejection murmur with split S2 at the left sternal border.   Respiratory: Lungs clear to auscultation bilaterally.   Abdomen: Nondistended.   Musculoskeletal/Extremities: Age-related degenerative joint disease.  Bilateral trace to 1+ pedal edema.  Integument: Remnants of right antecubital rash.  Cognitive/Psychiatric: Alert and oriented x4.  Affect is euthymic.    DIAGNOSTICS:   No results found for this or any previous visit (from the past 240 hour(s)).     Last Comprehensive Metabolic Panel:  Sodium   Date Value Ref Range Status   03/07/2022 139 136 - 145 mmol/L Final "   10/24/2017 139 133 - 144 mmol/L Final     Potassium   Date Value Ref Range Status   03/07/2022 3.8 3.5 - 5.0 mmol/L Final   10/24/2017 3.9 3.4 - 5.3 mmol/L Final     Chloride   Date Value Ref Range Status   03/07/2022 100 98 - 107 mmol/L Final   10/24/2017 101 94 - 109 mmol/L Final     Carbon Dioxide   Date Value Ref Range Status   10/24/2017 28 20 - 32 mmol/L Final     Carbon Dioxide (CO2)   Date Value Ref Range Status   03/07/2022 25 22 - 31 mmol/L Final     Anion Gap   Date Value Ref Range Status   03/07/2022 14 5 - 18 mmol/L Final   10/24/2017 10 3 - 14 mmol/L Final     Glucose   Date Value Ref Range Status   03/07/2022 282 (H) 70 - 125 mg/dL Final   10/24/2017 116 (H) 70 - 99 mg/dL Final     GLUCOSE BY METER POCT   Date Value Ref Range Status   03/13/2022 176 (H) 70 - 99 mg/dL Final     Urea Nitrogen   Date Value Ref Range Status   03/07/2022 23 8 - 28 mg/dL Final   10/24/2017 18 7 - 30 mg/dL Final     Creatinine   Date Value Ref Range Status   03/07/2022 1.07 0.60 - 1.10 mg/dL Final   10/24/2017 0.84 0.52 - 1.04 mg/dL Final     GFR Estimate   Date Value Ref Range Status   03/07/2022 53 (L) >60 mL/min/1.73m2 Final     Comment:     Effective December 21, 2021 eGFRcr in adults is calculated using the 2021 CKD-EPI creatinine equation which includes age and gender (Teena et al., NEJ, DOI: 10.1056/QNGBox0422131)   12/29/2020 >60 >60 mL/min/1.73m2 Final   10/24/2017 66 >60 mL/min/1.7m2 Final     Comment:     Non  GFR Calc     Calcium   Date Value Ref Range Status   03/07/2022 9.0 8.5 - 10.5 mg/dL Final   10/24/2017 9.5 8.5 - 10.1 mg/dL Final     Bilirubin Total   Date Value Ref Range Status   07/19/2021 0.7 0.0 - 1.0 mg/dL Final   02/14/2017 0.4 0.2 - 1.3 mg/dL Final     Alkaline Phosphatase   Date Value Ref Range Status   07/19/2021 159 (H) 45 - 120 U/L Final   02/14/2017 85 40 - 150 U/L Final     ALT   Date Value Ref Range Status   07/19/2021 52 (H) 0 - 45 U/L Final   02/14/2017 26 0 - 50 U/L  Final     AST   Date Value Ref Range Status   07/19/2021 28 0 - 40 U/L Final   02/14/2017 25 0 - 45 U/L Final       Lab Results   Component Value Date    WBC 8.9 03/11/2022    WBC 8.9 04/12/2016     Lab Results   Component Value Date    RBC 3.86 03/11/2022    RBC 3.99 04/12/2016     Lab Results   Component Value Date    HGB 8.7 03/11/2022    HGB 12.2 10/24/2017     Lab Results   Component Value Date    HCT 32.4 03/11/2022    HCT 36.0 04/12/2016     Lab Results   Component Value Date    MCV 84 03/11/2022    MCV 90 04/12/2016     Lab Results   Component Value Date    MCH 22.5 03/11/2022    MCH 26.1 04/12/2016     Lab Results   Component Value Date    MCHC 26.9 03/11/2022    MCHC 28.9 04/12/2016     Lab Results   Component Value Date    RDW 16.8 03/11/2022    RDW 15.8 04/12/2016     Lab Results   Component Value Date     03/11/2022     04/12/2016       ASSESSMENT/Plan:      ICD-10-CM    1. Pneumonia of right lower lobe due to infectious organism  J18.9    2. Severe sepsis (H)  A41.9     R65.20    3. Neural foraminal stenosis of cervical spine  M48.02    4. Paravertebral mass  R22.2    5. Multilevel spondylosis  M47.819    6. Type 2 diabetes mellitus with hyperglycemia, unspecified whether long term insulin use (H)  E11.65    7. Spasm of back muscles  M62.830    8. Bilateral hearing loss, unspecified hearing loss type  H91.93    9. Meniere's disease (cochlear hydrops) of both ears - on prednisone and triamterene hydrocholorothiazide  H81.03    10. Anxiety and depression  F41.9     F32.A    11. Essential hypertension  I10    12. Mixed hyperlipidemia  E78.2    13. Slow transit constipation  K59.01        CHANGES:    1.  Discontinue as needed diazepam and, instead, schedule 0.5 mg twice daily.    CARE PLAN:    The care plan, medications, vital signs, orders, and nursing notes have been reviewed, and all orders signed. Changes to care plan, if any, as noted. Otherwise, continue current plan of care.  Total  time spent in this encounter was 42 minutes, with greater than 50% spent in counseling and coordination of care that included going over this patient's long list of diagnoses followed by all of her medications.    The above has been created using voice recognition software. Please be aware that this may unintentionally  produce inaccuracies and/or nonsensical sentences.      Electronically signed by: JUAN ANTONIO Ching CNP

## 2022-03-25 ENCOUNTER — TRANSITIONAL CARE UNIT VISIT (OUTPATIENT)
Dept: GERIATRICS | Facility: CLINIC | Age: 82
End: 2022-03-25
Payer: MEDICARE

## 2022-03-25 VITALS
DIASTOLIC BLOOD PRESSURE: 65 MMHG | RESPIRATION RATE: 18 BRPM | WEIGHT: 190.6 LBS | TEMPERATURE: 97.3 F | BODY MASS INDEX: 31.75 KG/M2 | OXYGEN SATURATION: 92 % | HEIGHT: 65 IN | HEART RATE: 77 BPM | SYSTOLIC BLOOD PRESSURE: 138 MMHG

## 2022-03-25 DIAGNOSIS — F32.A ANXIETY AND DEPRESSION: ICD-10-CM

## 2022-03-25 DIAGNOSIS — R65.20 SEVERE SEPSIS (H): ICD-10-CM

## 2022-03-25 DIAGNOSIS — J18.9 PNEUMONIA OF RIGHT LOWER LOBE DUE TO INFECTIOUS ORGANISM: Primary | ICD-10-CM

## 2022-03-25 DIAGNOSIS — M47.819 MULTILEVEL SPONDYLOSIS: ICD-10-CM

## 2022-03-25 DIAGNOSIS — M62.830 SPASM OF BACK MUSCLES: ICD-10-CM

## 2022-03-25 DIAGNOSIS — H81.03 COCHLEAR HYDROPS OF BOTH EARS: ICD-10-CM

## 2022-03-25 DIAGNOSIS — A41.9 SEVERE SEPSIS (H): ICD-10-CM

## 2022-03-25 DIAGNOSIS — R22.2 PARAVERTEBRAL MASS: ICD-10-CM

## 2022-03-25 DIAGNOSIS — E78.2 MIXED HYPERLIPIDEMIA: ICD-10-CM

## 2022-03-25 DIAGNOSIS — M54.9 CHRONIC BILATERAL BACK PAIN, UNSPECIFIED BACK LOCATION: ICD-10-CM

## 2022-03-25 DIAGNOSIS — H91.93 BILATERAL HEARING LOSS, UNSPECIFIED HEARING LOSS TYPE: ICD-10-CM

## 2022-03-25 DIAGNOSIS — F31.76 BIPOLAR 1 DISORDER, DEPRESSED, FULL REMISSION (H): ICD-10-CM

## 2022-03-25 DIAGNOSIS — G89.29 CHRONIC BILATERAL BACK PAIN, UNSPECIFIED BACK LOCATION: ICD-10-CM

## 2022-03-25 DIAGNOSIS — E11.65 TYPE 2 DIABETES MELLITUS WITH HYPERGLYCEMIA, UNSPECIFIED WHETHER LONG TERM INSULIN USE (H): ICD-10-CM

## 2022-03-25 DIAGNOSIS — M48.02 NEURAL FORAMINAL STENOSIS OF CERVICAL SPINE: ICD-10-CM

## 2022-03-25 DIAGNOSIS — F41.9 ANXIETY AND DEPRESSION: ICD-10-CM

## 2022-03-25 DIAGNOSIS — I10 ESSENTIAL HYPERTENSION: ICD-10-CM

## 2022-03-25 DIAGNOSIS — K59.01 SLOW TRANSIT CONSTIPATION: ICD-10-CM

## 2022-03-25 PROCEDURE — 99309 SBSQ NF CARE MODERATE MDM 30: CPT | Performed by: NURSE PRACTITIONER

## 2022-03-25 NOTE — LETTER
3/25/2022        RE: Tonya Yang  [address]  [place] [address]        [facility]    Name:   Tonya Yang  :   1940  MRN:    [id number]     Facility:   [facility]) [[id number]]   Room: [place] [address]  Code Status: DNR and POLST AVAILABLE - Had been full code in the hospital    DOS:  2022  Previous visit: 2022    PCP:  Ananya Mclean NP     CHIEF COMPLAINT / REASON FOR VISIT:  Chief Complaint   Patient presents with     Clinic Care Coordination - Follow-up     CAP, severe sepsis, acute on chronic back pain      [facility] from 2022 until 2022 (pneumonia, severe sepsis, acute on chronic back pain)      HPI: Tonya is a 81 year old female with a past medical history significant for severe spinal stenosis (multilevel), polyarthralgia, Ménière's disease/cochlear hydrops, diabetes mellitus type 2 (with nephropathy), who presented with acute on chronic back pain that had become worse over the preceding months, and she could no longer ambulate with her cane and became essentially bedbound.  Further work-up in the ER was concerning for lactic acidosis, leukocytosis, and CXR concerning for medial right lower lobe pneumonia.  Treated with a fluid bolus, pain medications, and Zosyn and vancomycin, ultimately admitted for further work-up.    She was found to have leukocytosis and lactic acidosis, and a CXR was concerning for right-sided pneumonia.  She had a cough for about a week with symptoms of regurgitation.  A blood culture was negative.  Diagnosed with CAP and severe sepsis, she was treated with ceftriaxone and azithromycin with a transition to oral Augmentin.    She has had chronic back pain for some time.  Further work-up in the ER was concerning for severe multilevel spinal stenosis with bilateral neuroforaminal stenosis and compression of bilateral exiting cauda equina nerve roots, left greater  than right.  Spine surgery was consulted with ensuing discussion with the patient and her son, and it was recommended that she recover from pneumonia and then reassess the need for surgical intervention.  Later during her stay, she underwent an epidural steroid injection (03/11) with good effect, and it made it possible for her to participate with therapy and ambulate easier.    Incidental finding on MRI of the thoracic spine showed a small, well-circumscribed enhancing lesion along the left anterior lateral paravertebral soft tissue, measuring 1.2 x 1.1 x 1.4 cm.  Nonspecific finding could represent neurofibroma/schwannoma, nerve sheath tumor, metastatic lymph node, or atypical metastatic mass.  Radiologist recommended CT chest for further assessment.  This was ordered by neurosurgery, and it showed a mass in the last anterior paravertebral fat posterior to the descending thoracic aorta, most likely representing lymph node with recommendation for 6 months follow-up CT.    She also has essential hypertension that has required multiple medications, including triamterene hydrochlorothiazide (which she takes for Ménière's disease) and increasing doses of amlodipine.  She was started on carvedilol with good control and was kept on this at discharge.    As noted, she also has Ménière's disease for which, in addition to the triamterene hydrochlorothiazide, she also takes prednisone.  She stated that the condition is not severe and is only problematic if she gets up too fast.    She has a mood disorder for which she receives duloxetine, Lamictal, and as needed lorazepam for anxiety.    Overactive bladder places her on oxybutynin.  Occasional urge incontinence.    Diagnosis of GERD, and she did have symptoms of food stuck in her esophagus.  A swallow study was not concerning for aspiration.  Consider follow-up with GI if symptoms persist or recur and you may want to consider esophagogram to evaluate for motility issues.      She developed a right antecubital skin rash, likely secondary to tape.  Orders were given for 0.1% triamcinolone cream.    She has diabetes mellitus type 2 with hyperglycemia.  Her last A1c was 8.8.  She was on insulin while inpatient, and hyperglycemia was felt secondary to a steroid injection, and she continued to improve.  She is adamant against giving herself insulin.  She was ultimately ready for discharge to the TCU.    Suspected chronic kidney disease.  While she was not prescribed an ACE/ARB in the hospital, CKD was listed as the reason.  We do not yet have 3 recent metabolic profile.      CURRENT/RECENT TCU ISSUES    Disposition: She is seen today in her room. She states she is still in a lot of pain, 10/10 in the neck and left arm. Nursing staff she requests her pain medication almost exactly every four hours (when it is due). She tells me her muscle relaxant is making her sleepy, and she would rather have 1.5 tabs of the oxycodone instead of only one tab..     She also complains of a rash under her breasts and groin, she is receiving Nystatin powder for this, we will continue to monitor this.     The patient was discharged in the hospital discharge summary as full code; however, at this juncture she would prefer to be DNR.  She appears to play the role of drama queen during our initial visit and it does a good deal of coughing (although her lungs are clear).  She is extremely loquacious, and it is often difficult to get a word in edge wise.    Pain management: TODAY, she endorses pain in the neck and left arm. She has orders for muscle relaxers (methocarbamol 750 mg 4 times daily, increased from 500 mg 4 times daily on 03/22/2022) and Percocet (5/325 mg every 4 hours as needed).     Anxiety: She is currently experiencing a good deal of anxiety.  She has orders for as needed diazepam (0.5 mg twice daily) and she might very well benefit from scheduling every 12 hours.  She does find this effective,  "although she told me she really wasn't taking it at home.    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  Still, she does manage to hear well enough to carry on a conversation, occasionally needing to resort to writing things down.    Chronic kidney disease: Not quite sure.  On 03/07, her creatinine was 1.07.    Constipation: She earlier endorsed constipation, describing \"meatballs.\"  Now, she has loose stools.  When seen by Dr. Silverman on 03/18, they did discuss the possibility of giving Tonya a probiotic and, if the stools became worse and more watery, her stool would be tested for C. difficile.    Diabetes mellitus type 2 and antecubital rash: She is getting a steroids for her rash and states that it is raising her blood glucose levels; however, she has been on oral medications at home, recently presenting with an A1c of 8.8 as noted.  She also had some glycosuria in the hospital.  She adamantly refuses to accept any idea of giving herself insulin at home.  Her rash has now resolved.    Contacts: Davie, her son ([phone])    ROS:    Positives: Pain, rash under breasts and to groins.  Negatives: No headaches or chest pains, coughing or congestion, nausea or vomiting, dyspnea, dysuria, difficulty chewing or swallowing, or any problems with appetite or sleep.      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     Hepatic abscess      Hyperlipidemia      Hypertension      Irritable bowel syndrome      Meniere disease      Neuropathy      Noninfectious ileitis      Peritoneal abscess (H)      Spinal stenosis of lumbar region      " Steroid long-term use      Vaginitis, atrophic, postmenopausal      Vitamin D deficiency               Family History   Problem Relation Age of Onset     Cerebrovascular Disease Mother      Heart Disease Mother      Heart Disease Father      Heart Disease Brother      Diabetes No family hx of      Coronary Artery Disease No family hx of      Hypertension No family hx of      Hyperlipidemia No family hx of      Breast Cancer No family hx of      Colon Cancer No family hx of      Prostate Cancer No family hx of      Other Cancer No family hx of      Depression No family hx of      Anxiety Disorder No family hx of      Mental Illness No family hx of      Substance Abuse No family hx of      Anesthesia Reaction No family hx of      Asthma No family hx of      Osteoporosis No family hx of      Genetic Disorder No family hx of      Thyroid Disease No family hx of      Obesity No family hx of      Unknown/Adopted No family hx of      Social History     Socioeconomic History     Marital status:      Spouse name: None     Number of children: None     Years of education: None     Highest education level: None   Occupational History     None   Tobacco Use     Smoking status: Former Smoker     Types: Cigarettes     Quit date: 11/15/1987     Years since quittin.3     Smokeless tobacco: Former User   Substance and Sexual Activity     Alcohol use: Not Currently     Alcohol/week: 0.0 standard drinks     Comment: MALISSA white since      Drug use: No     Comment: used marijuanna in the past     Sexual activity: Never     Partners: Male   Other Topics Concern     Parent/sibling w/ CABG, MI or angioplasty before 65F 55M? No   Social History Narrative     None     Social Determinants of Health     Financial Resource Strain: Not on file   Food Insecurity: Not on file   Transportation Needs: Not on file   Physical Activity: Not on file   Stress: Not on file   Social Connections: Not on file   Intimate Partner Violence:  Not on file   Housing Stability: Not on file       MEDICATIONS: Reviewed from the MAR, physician orders, and/or earlier progress notes.  Post Discharge Medication Reconciliation Status: medication reconcilation previously completed during another office visit.    Current Outpatient Medications   Medication Sig     ACE/ARB NOT PRESCRIBED, INTENTIONAL, 1 each continuous prn ACE & ARB not prescribed due to CKD (Chronic Kidney Disease)     acetaminophen (TYLENOL) 325 MG tablet Take 1-2 tablets (325-650 mg) by mouth every 6 hours as needed for mild pain     amLODIPine (NORVASC) 10 MG tablet Take 1 tablet (10 mg) by mouth daily     atorvastatin (LIPITOR) 10 MG tablet Take 10 mg by mouth At Bedtime     blood glucose (ACCU-CHEK FASTCLIX) lancing device FOR TESTING ONCE DAILY. DX  E11.9 TYPE 2 DIABETES     blood glucose (CONTOUR TEST) test strip TESTING EVERY DAY DX  E11.9     carvedilol (COREG) 3.125 MG tablet Take 1 tablet (3.125 mg) by mouth 2 times daily (with meals)     CONTOUR NEXT TEST test strip TESTING EVERY DAY DX  E11.9     cyanocobalamin 1000 MCG SUBL Place 1,000 mcg under the tongue daily     diazepam (VALIUM) 1 MG/ML solution Take 0.5 mLs (0.5 mg) by mouth 2 times daily as needed for agitation, anxiety or muscle spasms (Patient taking differently: Take 0.5 mg by mouth every 12 hours )     DULoxetine (CYMBALTA) 60 MG EC capsule TAKE 1 CAPSULE (60 MG) BY MOUTH DAILY     fluconazole (DIFLUCAN) 150 MG tablet Take 150 mg by mouth See Admin Instructions One dose prn yeast infection      gabapentin (NEURONTIN) 100 MG capsule Take 2 capsules (200 mg) by mouth 2 times daily     glipiZIDE (GLUCOTROL) 10 MG tablet Take 1 tablet (10 mg) by mouth 2 times daily (before meals)     guaiFENesin-dextromethorphan (ROBITUSSIN DM) 100-10 MG/5ML syrup Take 10 mLs by mouth every 4 hours as needed for cough or congestion (productive cough)     JARDIANCE 25 MG TABS tablet Take 25 mg by mouth daily      lamoTRIgine (LAMICTAL) 100 MG  "tablet Take 100 mg by mouth 2 times daily     lidocaine (LMX4) 4 % external cream Apply topically 4 times daily as needed for mild pain or moderate pain Apply to painful areas, avoid red or open skin, avoid heat over ointment     metFORMIN (GLUCOPHAGE) 500 MG tablet Take 0.5 tablets (250 mg) by mouth 2 times daily (with meals)     methocarbamol (ROBAXIN) 500 MG tablet Take 1 tablet (500 mg) by mouth 4 times daily (Patient taking differently: Take 750 mg by mouth 4 times daily )     metoclopramide (REGLAN) 10 MG tablet Take 10 mg by mouth 3 times daily as needed Before meals as needed.     nystatin (MYCOSTATIN) 252791 UNIT/GM external powder Apply topically 2 times daily as needed Breast rash     omeprazole (PRILOSEC) 20 MG DR capsule Take 20 mg by mouth daily      order for DME Equipment being ordered: wrist brace     oxybutynin ER (DITROPAN-XL) 10 MG 24 hr tablet Take 10 mg by mouth At Bedtime      oxyCODONE-acetaminophen (PERCOCET) 5-325 MG tablet Take 1 tablet by mouth 3 times daily. May also take 1 tablet every 4 hours as needed for severe pain. Scheduled times are 6 am, 2 pm, and 10 pm     predniSONE (DELTASONE) 1 MG tablet Take 6 mg by mouth daily 1mg tab and 5mg tab     triamcinolone (KENALOG) 0.1 % external cream Apply topically 2 times daily as needed      triamterene-HCTZ (MAXZIDE-25) 37.5-25 MG tablet Take 1 tablet by mouth daily     vitamin D3 (CHOLECALCIFEROL) 50 mcg (2000 units) tablet Take 3 tablets by mouth daily     No current facility-administered medications for this visit.     ALLERGIES:   Allergies   Allergen Reactions     Propofol Nausea and Vomiting     Shellfish Allergy      Other reaction(s): Dizziness       DIET: Diabetic, regular texture, thin liquids.    Vitals:    03/25/22 1348   BP: 138/65   Pulse: 77   Resp: 18   Temp: 97.3  F (36.3  C)   SpO2: 92%   Weight: 86.5 kg (190 lb 9.6 oz)   Height: 1.651 m (5' 5\")     Body mass index is 31.72 kg/m .    EXAMINATION:   General: Pleasant, very " loquacious elderly female, sitting in the chair but would be better served with a recliner.   Head: Normocephalic and atraumatic.   Eyes: PERRLA, sclerae clear.   ENT: Moist oral mucosa.  Upper dentures and some remaining lower teeth.  Cardiovascular: Regular rate and rhythm with a 2/6 systolic ejection murmur with split S2 at the left sternal border.   Respiratory: Lungs clear to auscultation bilaterally.   Abdomen: Nondistended.   Musculoskeletal/Extremities: Age-related degenerative joint disease.  Bilateral trace to 1+ pedal edema.  Integument: Remnants of right antecubital rash.  Cognitive/Psychiatric: Alert and oriented x4.  Affect is euthymic.    DIAGNOSTICS:   No results found for this or any previous visit (from the past 240 hour(s)).     Last Comprehensive Metabolic Panel:  Sodium   Date Value Ref Range Status   03/07/2022 139 136 - 145 mmol/L Final   10/24/2017 139 133 - 144 mmol/L Final     Potassium   Date Value Ref Range Status   03/07/2022 3.8 3.5 - 5.0 mmol/L Final   10/24/2017 3.9 3.4 - 5.3 mmol/L Final     Chloride   Date Value Ref Range Status   03/07/2022 100 98 - 107 mmol/L Final   10/24/2017 101 94 - 109 mmol/L Final     Carbon Dioxide   Date Value Ref Range Status   10/24/2017 28 20 - 32 mmol/L Final     Carbon Dioxide (CO2)   Date Value Ref Range Status   03/07/2022 25 22 - 31 mmol/L Final     Anion Gap   Date Value Ref Range Status   03/07/2022 14 5 - 18 mmol/L Final   10/24/2017 10 3 - 14 mmol/L Final     Glucose   Date Value Ref Range Status   03/07/2022 282 (H) 70 - 125 mg/dL Final   10/24/2017 116 (H) 70 - 99 mg/dL Final     GLUCOSE BY METER POCT   Date Value Ref Range Status   03/13/2022 176 (H) 70 - 99 mg/dL Final     Urea Nitrogen   Date Value Ref Range Status   03/07/2022 23 8 - 28 mg/dL Final   10/24/2017 18 7 - 30 mg/dL Final     Creatinine   Date Value Ref Range Status   03/07/2022 1.07 0.60 - 1.10 mg/dL Final   10/24/2017 0.84 0.52 - 1.04 mg/dL Final     GFR Estimate   Date Value  Ref Range Status   03/07/2022 53 (L) >60 mL/min/1.73m2 Final     Comment:     Effective December 21, 2021 eGFRcr in adults is calculated using the 2021 CKD-EPI creatinine equation which includes age and gender (Teena hampton al., NEJM, DOI: 10.1056/POXBur4924260)   12/29/2020 >60 >60 mL/min/1.73m2 Final   10/24/2017 66 >60 mL/min/1.7m2 Final     Comment:     Non  GFR Calc     Calcium   Date Value Ref Range Status   03/07/2022 9.0 8.5 - 10.5 mg/dL Final   10/24/2017 9.5 8.5 - 10.1 mg/dL Final     Bilirubin Total   Date Value Ref Range Status   07/19/2021 0.7 0.0 - 1.0 mg/dL Final   02/14/2017 0.4 0.2 - 1.3 mg/dL Final     Alkaline Phosphatase   Date Value Ref Range Status   07/19/2021 159 (H) 45 - 120 U/L Final   02/14/2017 85 40 - 150 U/L Final     ALT   Date Value Ref Range Status   07/19/2021 52 (H) 0 - 45 U/L Final   02/14/2017 26 0 - 50 U/L Final     AST   Date Value Ref Range Status   07/19/2021 28 0 - 40 U/L Final   02/14/2017 25 0 - 45 U/L Final       Lab Results   Component Value Date    WBC 8.9 03/11/2022    WBC 8.9 04/12/2016     Lab Results   Component Value Date    RBC 3.86 03/11/2022    RBC 3.99 04/12/2016     Lab Results   Component Value Date    HGB 8.7 03/11/2022    HGB 12.2 10/24/2017     Lab Results   Component Value Date    HCT 32.4 03/11/2022    HCT 36.0 04/12/2016     Lab Results   Component Value Date    MCV 84 03/11/2022    MCV 90 04/12/2016     Lab Results   Component Value Date    MCH 22.5 03/11/2022    MCH 26.1 04/12/2016     Lab Results   Component Value Date    MCHC 26.9 03/11/2022    MCHC 28.9 04/12/2016     Lab Results   Component Value Date    RDW 16.8 03/11/2022    RDW 15.8 04/12/2016     Lab Results   Component Value Date     03/11/2022     04/12/2016       ASSESSMENT/Plan:      ICD-10-CM    1. Pneumonia of right lower lobe due to infectious organism  J18.9    2. Severe sepsis (H)  A41.9     R65.20    3. Neural foraminal stenosis of cervical spine, bilateral   M48.02    4. Paravertebral mass  R22.2    5. Multilevel spondylosis  M47.819    6. Type 2 diabetes mellitus with hyperglycemia, unspecified whether long term insulin use (H)  E11.65    7. Spasm of back muscles  M62.830    8. Bilateral hearing loss, unspecified hearing loss type  H91.93    9. Bipolar 1 disorder, depressed, full remission (H)  F31.76    10. Meniere's disease (cochlear hydrops) of both ears - on prednisone and triamterene hydrocholorothiazide  H81.03    11. Anxiety and depression  F41.9     F32.A    12. Essential hypertension  I10    13. Chronic bilateral back pain, unspecified back location  M54.9     G89.29    14. Mixed hyperlipidemia  E78.2    15. Slow transit constipation  K59.01         CHANGES:    None.    CARE PLAN:    The care plan, medications, vital signs, orders, and nursing notes have been reviewed, and all orders signed. Changes to care plan, if any, as noted. Otherwise, continue current plan of care.      The above has been created using voice recognition software. Please be aware that this may unintentionally  produce inaccuracies and/or nonsensical sentences.    I was present with the medical student/advanced practice registered nurse, Marce Elliott RN, who participated in the service and in the documentation of this note.  I have verified the history and personally performed the physical exam and medical decision making.  I agree with the assessment and plan of care as documented in the note.      Electronically signed by: JUAN ANTONIO Ching CNP        Sincerely,        JUAN ANTONIO Ching CNP

## 2022-03-25 NOTE — PROGRESS NOTES
[facility]    Name:   Tonya Yang  :   1940  MRN:    [id number]     Facility:   [facility] ([facility]) [[id number]]   Room: [facility] / [address]  Code Status: DNR and POLST AVAILABLE - Had been full code in the hospital    DOS:  2022  Previous visit: 03/15/2022 and subsequently seen by Dr. Caitlin Silverman on 2022    PCP:  Ananya Mclean NP     CHIEF COMPLAINT / REASON FOR VISIT:  Chief Complaint   Patient presents with     Clinic Care Coordination - Follow-up     CAP, severe sepsis, acute on chronic back pain      [facility] from 2022 until 2022 (pneumonia, severe sepsis, acute on chronic back pain)      HPI: Tonya is a 81 year old female with a past medical history significant for severe spinal stenosis (multilevel), polyarthralgia, Ménière's disease/cochlear hydrops, diabetes mellitus type 2 (with nephropathy), who presented with acute on chronic back pain that had become worse over the preceding months, and she could no longer ambulate with her cane and became essentially bedbound.  Further work-up in the ER was concerning for lactic acidosis, leukocytosis, and CXR concerning for medial right lower lobe pneumonia.  Treated with a fluid bolus, pain medications, and Zosyn and vancomycin, ultimately admitted for further work-up.    She was found to have leukocytosis and lactic acidosis, and a CXR was concerning for right-sided pneumonia.  She had a cough for about a week with symptoms of regurgitation.  A blood culture was negative.  Diagnosed with CAP and severe sepsis, she was treated with ceftriaxone and azithromycin with a transition to oral Augmentin.    She has had chronic back pain for some time.  Further work-up in the ER was concerning for severe multilevel spinal stenosis with bilateral neuroforaminal stenosis and compression of bilateral exiting cauda equina nerve roots, left greater than right.  Spine surgery was consulted  with ensuing discussion with the patient and her son, and it was recommended that she recover from pneumonia and then reassess the need for surgical intervention.  Later during her stay, she underwent an epidural steroid injection (03/11) with good effect, and it made it possible for her to participate with therapy and ambulate easier.    Incidental finding on MRI of the thoracic spine showed a small, well-circumscribed enhancing lesion along the left anterior lateral paravertebral soft tissue, measuring 1.2 x 1.1 x 1.4 cm.  Nonspecific finding could represent neurofibroma/schwannoma, nerve sheath tumor, metastatic lymph node, or atypical metastatic mass.  Radiologist recommended CT chest for further assessment.  This was ordered by neurosurgery, and it showed a mass in the last anterior paravertebral fat posterior to the descending thoracic aorta, most likely representing lymph node with recommendation for 6 months follow-up CT.    She also has essential hypertension that has required multiple medications, including triamterene hydrochlorothiazide (which she takes for Ménière's disease) and increasing doses of amlodipine.  She was started on carvedilol with good control and was kept on this at discharge.    As noted, she also has Ménière's disease for which, in addition to the triamterene hydrochlorothiazide, she also takes prednisone.  She stated that the condition is not severe and is only problematic if she gets up too fast.    She has a mood disorder for which she receives duloxetine, Lamictal, and as needed lorazepam for anxiety.    Overactive bladder places her on oxybutynin.  Occasional urge incontinence.    Diagnosis of GERD, and she did have symptoms of food stuck in her esophagus.  A swallow study was not concerning for aspiration.  Consider follow-up with GI if symptoms persist or recur and you may want to consider esophagogram to evaluate for motility issues.     She developed a right antecubital skin  "rash, likely secondary to tape.  Orders were given for 0.1% triamcinolone cream.    She has diabetes mellitus type 2 with hyperglycemia.  Her last A1c was 8.8.  She was on insulin while inpatient, and hyperglycemia was felt secondary to a steroid injection, and she continued to improve.  She is adamant against giving herself insulin.  She was ultimately ready for discharge to the TCU.    Suspected chronic kidney disease.  While she was not prescribed an ACE/ARB in the hospital, CKD was listed as the reason.  We do not yet have 3 recent metabolic profile.      CURRENT/RECENT TCU ISSUES    Disposition: Doing well today reading lips.  Staying at a good distance, the mask is removed.  I asked her about her bipolar one disorder and anxiety.  Her response was, \"sometimes I kind of flipped out.  Now, I'm learning to handle it.\"    We talked about her pain today.  She did receive a pain pill before lunch, and when I asked her if it was working, she responded that the pain had escalated, and she would like to receive 1.5 tablets of Percocet, as she claims to have received in the hospital.  She repeated this multiple times during the encounter.  I eventually suggested that we double her methocarbamol from a 500 mg 4 times daily to 1000 mg 4 times daily, at which point she complained that it would make her too lethargic, and we settled on 750 mg (see below).    The patient was discharged in the hospital discharge summary as full code; however, at this juncture she would prefer to be DNR.  She appears to play the role of drama queen during our initial visit and it does a good deal of coughing (although her lungs are clear).  She is extremely loquacious, and it is often difficult to get a word in edge wise.    Pain management: Today, she endorses pain in the neck and shoulders and rates the pain at 10, although she is quite relaxed when saying this.  When last seen, she talked about her \"traveling\" spinal stenosis (she had an " "epidural in the hospital that was ineffective), right-sided pain from the buttocks, and told me she is \"wobbly and weak.\"  She has orders for muscle relaxers (methocarbamol 500 mg 4 times daily) and Percocet (5/325 mg every 4 hours as needed.  We are increasing the methocarbamol to 750 mg 4 times daily.    Anxiety: She is currently experiencing a good deal of anxiety.  She has orders for as needed diazepam (0.5 mg twice daily) and she might very well benefit from scheduling every 12 hours.  She does find this effective, although she told me she really wasn't taking it at home.    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  Still, she does manage to hear well enough to carry on a conversation, occasionally needing to resort to writing things down.    Chronic kidney disease: Not quite sure.  On 03/07, her creatinine was 1.07.    Constipation: She earlier endorsed constipation, describing \"meatballs.\"  Now, she has loose stools.  When seen by Dr. Silverman on 03/18, they did discuss the possibility of giving Tonya a probiotic and, if the stools became worse and more watery, her stool would be tested for C. difficile.    Diabetes mellitus type 2 and antecubital rash: She is getting a steroids for her rash and states that it is raising her blood glucose levels; however, she has been on oral medications at home, recently presenting with an A1c of 8.8 as noted.  She also had some glycosuria in the hospital.  She adamantly refuses to accept any idea of giving herself insulin at home.  Her rash being treated with triamcinolone 0.1% cream is improving.    Contacts: Davie, her son ([phone])      ROS:    Positives: As noted above.  Negatives: No headaches or chest pains, coughing or congestion, nausea or vomiting, dyspnea, dysuria, difficulty chewing or swallowing, or any problems with appetite or sleep.    Past Medical History:   Diagnosis Date     Abdominal pain      Anxiety      Arthritis      Asthma  "     Bipolar 1 disorder (H)      Chronic pain      Cochlear hydrops     steriods and diazide     COPD (chronic obstructive pulmonary disease) (H)      Depression      Diabetes mellitus (H)      Dyspepsia      GERD (gastroesophageal reflux disease)      Hard of hearing     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     Hepatic abscess      Hyperlipidemia      Hypertension      Irritable bowel syndrome      Meniere disease      Neuropathy      Noninfectious ileitis      Peritoneal abscess (H)      Spinal stenosis of lumbar region      Steroid long-term use      Vaginitis, atrophic, postmenopausal      Vitamin D deficiency               Family History   Problem Relation Age of Onset     Cerebrovascular Disease Mother      Heart Disease Mother      Heart Disease Father      Heart Disease Brother      Diabetes No family hx of      Coronary Artery Disease No family hx of      Hypertension No family hx of      Hyperlipidemia No family hx of      Breast Cancer No family hx of      Colon Cancer No family hx of      Prostate Cancer No family hx of      Other Cancer No family hx of      Depression No family hx of      Anxiety Disorder No family hx of      Mental Illness No family hx of      Substance Abuse No family hx of      Anesthesia Reaction No family hx of      Asthma No family hx of      Osteoporosis No family hx of      Genetic Disorder No family hx of      Thyroid Disease No family hx of      Obesity No family hx of      Unknown/Adopted No family hx of      Social History     Socioeconomic History     Marital status:      Spouse name: None     Number of children: None     Years of education: None     Highest education level: None   Occupational History     None   Tobacco Use     Smoking status: Former Smoker     Types: Cigarettes     Quit date: 11/15/1987     Years since quittin.3     Smokeless tobacco: Former User   Substance and Sexual Activity     Alcohol use: Not Currently     Alcohol/week: 0.0  standard drinks     Comment: MLAISSA white since 9/131986     Drug use: No     Comment: used marijuanna in the past     Sexual activity: Never     Partners: Male   Other Topics Concern     Parent/sibling w/ CABG, MI or angioplasty before 65F 55M? No   Social History Narrative     None     Social Determinants of Health     Financial Resource Strain: Not on file   Food Insecurity: Not on file   Transportation Needs: Not on file   Physical Activity: Not on file   Stress: Not on file   Social Connections: Not on file   Intimate Partner Violence: Not on file   Housing Stability: Not on file       MEDICATIONS: Reviewed from the MAR, physician orders, and/or earlier progress notes.  Post Discharge Medication Reconciliation Status: medication reconcilation previously completed during another office visit.    Current Outpatient Medications   Medication Sig     ACE/ARB NOT PRESCRIBED, INTENTIONAL, 1 each continuous prn ACE & ARB not prescribed due to CKD (Chronic Kidney Disease)     acetaminophen (TYLENOL) 325 MG tablet Take 1-2 tablets (325-650 mg) by mouth every 6 hours as needed for mild pain     amLODIPine (NORVASC) 10 MG tablet Take 1 tablet (10 mg) by mouth daily     atorvastatin (LIPITOR) 10 MG tablet Take 10 mg by mouth At Bedtime     blood glucose (ACCU-CHEK FASTCLIX) lancing device FOR TESTING ONCE DAILY. DX  E11.9 TYPE 2 DIABETES     blood glucose (CONTOUR TEST) test strip TESTING EVERY DAY DX  E11.9     carvedilol (COREG) 3.125 MG tablet Take 1 tablet (3.125 mg) by mouth 2 times daily (with meals)     CONTOUR NEXT TEST test strip TESTING EVERY DAY DX  E11.9     cyanocobalamin 1000 MCG SUBL Place 1,000 mcg under the tongue daily     diazepam (VALIUM) 1 MG/ML solution Take 0.5 mLs (0.5 mg) by mouth 2 times daily as needed for agitation, anxiety or muscle spasms (Patient taking differently: Take 0.5 mg by mouth every 12 hours )     DULoxetine (CYMBALTA) 60 MG EC capsule TAKE 1 CAPSULE (60 MG) BY MOUTH DAILY      fluconazole (DIFLUCAN) 150 MG tablet Take 150 mg by mouth See Admin Instructions One dose prn yeast infection      gabapentin (NEURONTIN) 100 MG capsule Take 2 capsules (200 mg) by mouth 2 times daily     glipiZIDE (GLUCOTROL) 10 MG tablet Take 1 tablet (10 mg) by mouth 2 times daily (before meals)     guaiFENesin-dextromethorphan (ROBITUSSIN DM) 100-10 MG/5ML syrup Take 10 mLs by mouth every 4 hours as needed for cough or congestion (productive cough)     JARDIANCE 25 MG TABS tablet Take 25 mg by mouth daily      lamoTRIgine (LAMICTAL) 100 MG tablet Take 100 mg by mouth 2 times daily     lidocaine (LMX4) 4 % external cream Apply topically 4 times daily as needed for mild pain or moderate pain Apply to painful areas, avoid red or open skin, avoid heat over ointment     metFORMIN (GLUCOPHAGE) 500 MG tablet Take 0.5 tablets (250 mg) by mouth 2 times daily (with meals)     methocarbamol (ROBAXIN) 500 MG tablet Take 1 tablet (500 mg) by mouth 4 times daily     metoclopramide (REGLAN) 10 MG tablet Take 10 mg by mouth 3 times daily as needed Before meals as needed.     nystatin (MYCOSTATIN) 299008 UNIT/GM external powder Apply topically 2 times daily as needed Breast rash     omeprazole (PRILOSEC) 20 MG DR capsule Take 20 mg by mouth daily      order for DME Equipment being ordered: wrist brace     oxybutynin ER (DITROPAN-XL) 10 MG 24 hr tablet Take 10 mg by mouth At Bedtime      oxyCODONE-acetaminophen (PERCOCET) 5-325 MG tablet Take 1 tablet by mouth 3 times daily. May also take 1 tablet every 4 hours as needed for severe pain. Scheduled times are 6 am, 2 pm, and 10 pm     predniSONE (DELTASONE) 1 MG tablet Take 6 mg by mouth daily 1mg tab and 5mg tab     triamcinolone (KENALOG) 0.1 % external cream Apply topically 2 times daily as needed      triamterene-HCTZ (MAXZIDE-25) 37.5-25 MG tablet Take 1 tablet by mouth daily     vitamin D3 (CHOLECALCIFEROL) 50 mcg (2000 units) tablet Take 3 tablets by mouth daily     No  "current facility-administered medications for this visit.     ALLERGIES:   Allergies   Allergen Reactions     Propofol Nausea and Vomiting     Shellfish Allergy      Other reaction(s): Dizziness       DIET: Diabetic, regular texture, thin liquids.    Vitals:    03/22/22 1310   BP: 126/64   Pulse: 76   Resp: 18   Temp: 99.1  F (37.3  C)   SpO2: 91%   Weight: 86.5 kg (190 lb 9.6 oz)   Height: 1.651 m (5' 5\")     Body mass index is 31.72 kg/m .    EXAMINATION:   General: Pleasant, very loquacious elderly female, sitting in the chair but would be better served with a recliner.   Head: Normocephalic and atraumatic.   Eyes: PERRLA, sclerae clear.   ENT: Moist oral mucosa.  Upper dentures and some remaining lower teeth.  Cardiovascular: Regular rate and rhythm with a 2/6 systolic ejection murmur with split S2 at the left sternal border.   Respiratory: Lungs clear to auscultation bilaterally.   Abdomen: Nondistended.   Musculoskeletal/Extremities: Age-related degenerative joint disease.  Bilateral trace to 1+ pedal edema.  Integument: Remnants of right antecubital rash.  Cognitive/Psychiatric: Alert and oriented x4.  Affect is euthymic.    DIAGNOSTICS:   No results found for this or any previous visit (from the past 240 hour(s)).     Last Comprehensive Metabolic Panel:  Sodium   Date Value Ref Range Status   03/07/2022 139 136 - 145 mmol/L Final   10/24/2017 139 133 - 144 mmol/L Final     Potassium   Date Value Ref Range Status   03/07/2022 3.8 3.5 - 5.0 mmol/L Final   10/24/2017 3.9 3.4 - 5.3 mmol/L Final     Chloride   Date Value Ref Range Status   03/07/2022 100 98 - 107 mmol/L Final   10/24/2017 101 94 - 109 mmol/L Final     Carbon Dioxide   Date Value Ref Range Status   10/24/2017 28 20 - 32 mmol/L Final     Carbon Dioxide (CO2)   Date Value Ref Range Status   03/07/2022 25 22 - 31 mmol/L Final     Anion Gap   Date Value Ref Range Status   03/07/2022 14 5 - 18 mmol/L Final   10/24/2017 10 3 - 14 mmol/L Final "     Glucose   Date Value Ref Range Status   03/07/2022 282 (H) 70 - 125 mg/dL Final   10/24/2017 116 (H) 70 - 99 mg/dL Final     GLUCOSE BY METER POCT   Date Value Ref Range Status   03/13/2022 176 (H) 70 - 99 mg/dL Final     Urea Nitrogen   Date Value Ref Range Status   03/07/2022 23 8 - 28 mg/dL Final   10/24/2017 18 7 - 30 mg/dL Final     Creatinine   Date Value Ref Range Status   03/07/2022 1.07 0.60 - 1.10 mg/dL Final   10/24/2017 0.84 0.52 - 1.04 mg/dL Final     GFR Estimate   Date Value Ref Range Status   03/07/2022 53 (L) >60 mL/min/1.73m2 Final     Comment:     Effective December 21, 2021 eGFRcr in adults is calculated using the 2021 CKD-EPI creatinine equation which includes age and gender (Teena hampton al., [place], DOI: 10.1056/FDMJsj3743926)   12/29/2020 >60 >60 mL/min/1.73m2 Final   10/24/2017 66 >60 mL/min/1.7m2 Final     Comment:     Non  GFR Calc     Calcium   Date Value Ref Range Status   03/07/2022 9.0 8.5 - 10.5 mg/dL Final   10/24/2017 9.5 8.5 - 10.1 mg/dL Final     Bilirubin Total   Date Value Ref Range Status   07/19/2021 0.7 0.0 - 1.0 mg/dL Final   02/14/2017 0.4 0.2 - 1.3 mg/dL Final     Alkaline Phosphatase   Date Value Ref Range Status   07/19/2021 159 (H) 45 - 120 U/L Final   02/14/2017 85 40 - 150 U/L Final     ALT   Date Value Ref Range Status   07/19/2021 52 (H) 0 - 45 U/L Final   02/14/2017 26 0 - 50 U/L Final     AST   Date Value Ref Range Status   07/19/2021 28 0 - 40 U/L Final   02/14/2017 25 0 - 45 U/L Final       Lab Results   Component Value Date    WBC 8.9 03/11/2022    WBC 8.9 04/12/2016     Lab Results   Component Value Date    RBC 3.86 03/11/2022    RBC 3.99 04/12/2016     Lab Results   Component Value Date    HGB 8.7 03/11/2022    HGB 12.2 10/24/2017     Lab Results   Component Value Date    HCT 32.4 03/11/2022    HCT 36.0 04/12/2016     Lab Results   Component Value Date    MCV 84 03/11/2022    MCV 90 04/12/2016     Lab Results   Component Value Date    MCH 22.5  03/11/2022    MCH 26.1 04/12/2016     Lab Results   Component Value Date    MCHC 26.9 03/11/2022    MCHC 28.9 04/12/2016     Lab Results   Component Value Date    RDW 16.8 03/11/2022    RDW 15.8 04/12/2016     Lab Results   Component Value Date     03/11/2022     04/12/2016       ASSESSMENT/Plan:      ICD-10-CM    1. Pneumonia of right lower lobe due to infectious organism  J18.9    2. Severe sepsis (H)  A41.9     R65.20    3. Neural foraminal stenosis of cervical spine, bilateral  M48.02    4. Paravertebral mass  R22.2    5. Multilevel spondylosis  M47.819    6. Type 2 diabetes mellitus with hyperglycemia, unspecified whether long term insulin use (H)  E11.65    7. Spasm of back muscles  M62.830    8. Bilateral hearing loss, unspecified hearing loss type  H91.93    9. Bipolar 1 disorder, depressed, full remission (H)  F31.76    10. Meniere's disease (cochlear hydrops) of both ears - on prednisone and triamterene hydrocholorothiazide  H81.03    11. Anxiety and depression  F41.9     F32.A    12. Essential hypertension  I10    13. Chronic bilateral back pain, unspecified back location  M54.9     G89.29    14. Mixed hyperlipidemia  E78.2    15. Slow transit constipation  K59.01        CHANGES:    1.  Increase methocarbamol from 500 mg 4 times daily to 750 mg 4 times daily.  Will reassess at the next visit.    CARE PLAN:    The care plan, medications, vital signs, orders, and nursing notes have been reviewed, and all orders signed. Changes to care plan, if any, as noted. Otherwise, continue current plan of care.      The above has been created using voice recognition software. Please be aware that this may unintentionally  produce inaccuracies and/or nonsensical sentences.      Electronically signed by: JUAN ANTONIO Ching CNP

## 2022-03-27 NOTE — PROGRESS NOTES
[facility]    Name:   Tonya Yang  :   1940  MRN:    [id number]     Facility:   [facility] ([facility]) [[id number]]   Room: [facility] / [address]  Code Status: DNR and POLST AVAILABLE - Had been full code in the hospital    DOS:  2022  Previous visit: 2022    PCP:  Ananya Mclean NP     CHIEF COMPLAINT / REASON FOR VISIT:  Chief Complaint   Patient presents with     Clinic Care Coordination - Follow-up     CAP, severe sepsis, acute on chronic back pain      [facility] from 2022 until 2022 (pneumonia, severe sepsis, acute on chronic back pain)      HPI: Tonya is a 81 year old female with a past medical history significant for severe spinal stenosis (multilevel), polyarthralgia, Ménière's disease/cochlear hydrops, diabetes mellitus type 2 (with nephropathy), who presented with acute on chronic back pain that had become worse over the preceding months, and she could no longer ambulate with her cane and became essentially bedbound.  Further work-up in the ER was concerning for lactic acidosis, leukocytosis, and CXR concerning for medial right lower lobe pneumonia.  Treated with a fluid bolus, pain medications, and Zosyn and vancomycin, ultimately admitted for further work-up.    She was found to have leukocytosis and lactic acidosis, and a CXR was concerning for right-sided pneumonia.  She had a cough for about a week with symptoms of regurgitation.  A blood culture was negative.  Diagnosed with CAP and severe sepsis, she was treated with ceftriaxone and azithromycin with a transition to oral Augmentin.    She has had chronic back pain for some time.  Further work-up in the ER was concerning for severe multilevel spinal stenosis with bilateral neuroforaminal stenosis and compression of bilateral exiting cauda equina nerve roots, left greater than right.  Spine surgery was consulted with ensuing discussion with the patient and her son,  and it was recommended that she recover from pneumonia and then reassess the need for surgical intervention.  Later during her stay, she underwent an epidural steroid injection (03/11) with good effect, and it made it possible for her to participate with therapy and ambulate easier.    Incidental finding on MRI of the thoracic spine showed a small, well-circumscribed enhancing lesion along the left anterior lateral paravertebral soft tissue, measuring 1.2 x 1.1 x 1.4 cm.  Nonspecific finding could represent neurofibroma/schwannoma, nerve sheath tumor, metastatic lymph node, or atypical metastatic mass.  Radiologist recommended CT chest for further assessment.  This was ordered by neurosurgery, and it showed a mass in the last anterior paravertebral fat posterior to the descending thoracic aorta, most likely representing lymph node with recommendation for 6 months follow-up CT.    She also has essential hypertension that has required multiple medications, including triamterene hydrochlorothiazide (which she takes for Ménière's disease) and increasing doses of amlodipine.  She was started on carvedilol with good control and was kept on this at discharge.    As noted, she also has Ménière's disease for which, in addition to the triamterene hydrochlorothiazide, she also takes prednisone.  She stated that the condition is not severe and is only problematic if she gets up too fast.    She has a mood disorder for which she receives duloxetine, Lamictal, and as needed lorazepam for anxiety.    Overactive bladder places her on oxybutynin.  Occasional urge incontinence.    Diagnosis of GERD, and she did have symptoms of food stuck in her esophagus.  A swallow study was not concerning for aspiration.  Consider follow-up with GI if symptoms persist or recur and you may want to consider esophagogram to evaluate for motility issues.     She developed a right antecubital skin rash, likely secondary to tape.  Orders were given for  0.1% triamcinolone cream.    She has diabetes mellitus type 2 with hyperglycemia.  Her last A1c was 8.8.  She was on insulin while inpatient, and hyperglycemia was felt secondary to a steroid injection, and she continued to improve.  She is adamant against giving herself insulin.  She was ultimately ready for discharge to the TCU.    Suspected chronic kidney disease.  While she was not prescribed an ACE/ARB in the hospital, CKD was listed as the reason.  We do not yet have 3 recent metabolic profile.      CURRENT/RECENT TCU ISSUES    Disposition: She is seen today in her room. She states she is still in a lot of pain, 10/10 in the neck and left arm. Nursing staff she requests her pain medication almost exactly every four hours (when it is due). She tells me her muscle relaxant is making her sleepy, and she would rather have 1.5 tabs of the oxycodone instead of only one tab..     She also complains of a rash under her breasts and groin, she is receiving Nystatin powder for this, we will continue to monitor this.     The patient was discharged in the hospital discharge summary as full code; however, at this juncture she would prefer to be DNR.  She appears to play the role of drama queen during our initial visit and it does a good deal of coughing (although her lungs are clear).  She is extremely loquacious, and it is often difficult to get a word in [employer] wise.    Pain management: TODAY, she endorses pain in the neck and left arm. She has orders for muscle relaxers (methocarbamol 750 mg 4 times daily, increased from 500 mg 4 times daily on 03/22/2022) and Percocet (5/325 mg every 4 hours as needed).     Anxiety: She is currently experiencing a good deal of anxiety.  She has orders for as needed diazepam (0.5 mg twice daily) and she might very well benefit from scheduling every 12 hours.  She does find this effective, although she told me she really wasn't taking it at home.    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  Still, she does manage to hear well enough to carry on a conversation, occasionally needing to resort to writing things down.    Chronic kidney disease: Not quite sure.  On 03/07, her creatinine was 1.07.    Constipation: She earlier endorsed constipation, describing \"meatballs.\"  Now, she has loose stools.  When seen by Dr. Silverman on 03/18, they did discuss the possibility of giving Tonya a probiotic and, if the stools became worse and more watery, her stool would be tested for C. difficile.    Diabetes mellitus type 2 and antecubital rash: She is getting a steroids for her rash and states that it is raising her blood glucose levels; however, she has been on oral medications at home, recently presenting with an A1c of 8.8 as noted.  She also had some glycosuria in the hospital.  She adamantly refuses to accept any idea of giving herself insulin at home.  Her rash has now resolved.    Contacts: Davie, her son ([phone])    ROS:    Positives: Pain, rash under breasts and to groins.  Negatives: No headaches or chest pains, coughing or congestion, nausea or vomiting, dyspnea, dysuria, difficulty chewing or swallowing, or any problems with appetite or sleep.      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     Hepatic abscess      Hyperlipidemia      Hypertension      Irritable bowel syndrome      Meniere disease      Neuropathy      Noninfectious ileitis      Peritoneal abscess (H)      Spinal stenosis of lumbar region      Steroid long-term use      Vaginitis, atrophic, postmenopausal      Vitamin D deficiency             "   Family History   Problem Relation Age of Onset     Cerebrovascular Disease Mother      Heart Disease Mother      Heart Disease Father      Heart Disease Brother      Diabetes No family hx of      Coronary Artery Disease No family hx of      Hypertension No family hx of      Hyperlipidemia No family hx of      Breast Cancer No family hx of      Colon Cancer No family hx of      Prostate Cancer No family hx of      Other Cancer No family hx of      Depression No family hx of      Anxiety Disorder No family hx of      Mental Illness No family hx of      Substance Abuse No family hx of      Anesthesia Reaction No family hx of      Asthma No family hx of      Osteoporosis No family hx of      Genetic Disorder No family hx of      Thyroid Disease No family hx of      Obesity No family hx of      Unknown/Adopted No family hx of      Social History     Socioeconomic History     Marital status:      Spouse name: None     Number of children: None     Years of education: None     Highest education level: None   Occupational History     None   Tobacco Use     Smoking status: Former Smoker     Types: Cigarettes     Quit date: 11/15/1987     Years since quittin.3     Smokeless tobacco: Former User   Substance and Sexual Activity     Alcohol use: Not Currently     Alcohol/week: 0.0 standard drinks     Comment: MALISSA white since      Drug use: No     Comment: used marijuanna in the past     Sexual activity: Never     Partners: Male   Other Topics Concern     Parent/sibling w/ CABG, MI or angioplasty before 65F 55M? No   Social History Narrative     None     Social Determinants of Health     Financial Resource Strain: Not on file   Food Insecurity: Not on file   Transportation Needs: Not on file   Physical Activity: Not on file   Stress: Not on file   Social Connections: Not on file   Intimate Partner Violence: Not on file   Housing Stability: Not on file       MEDICATIONS: Reviewed from the MAR, physician  orders, and/or earlier progress notes.  Post Discharge Medication Reconciliation Status: medication reconcilation previously completed during another office visit.    Current Outpatient Medications   Medication Sig     ACE/ARB NOT PRESCRIBED, INTENTIONAL, 1 each continuous prn ACE & ARB not prescribed due to CKD (Chronic Kidney Disease)     acetaminophen (TYLENOL) 325 MG tablet Take 1-2 tablets (325-650 mg) by mouth every 6 hours as needed for mild pain     amLODIPine (NORVASC) 10 MG tablet Take 1 tablet (10 mg) by mouth daily     atorvastatin (LIPITOR) 10 MG tablet Take 10 mg by mouth At Bedtime     blood glucose (ACCU-CHEK FASTCLIX) lancing device FOR TESTING ONCE DAILY. DX  E11.9 TYPE 2 DIABETES     blood glucose (CONTOUR TEST) test strip TESTING EVERY DAY DX  E11.9     carvedilol (COREG) 3.125 MG tablet Take 1 tablet (3.125 mg) by mouth 2 times daily (with meals)     CONTOUR NEXT TEST test strip TESTING EVERY DAY DX  E11.9     cyanocobalamin 1000 MCG SUBL Place 1,000 mcg under the tongue daily     diazepam (VALIUM) 1 MG/ML solution Take 0.5 mLs (0.5 mg) by mouth 2 times daily as needed for agitation, anxiety or muscle spasms (Patient taking differently: Take 0.5 mg by mouth every 12 hours )     DULoxetine (CYMBALTA) 60 MG EC capsule TAKE 1 CAPSULE (60 MG) BY MOUTH DAILY     fluconazole (DIFLUCAN) 150 MG tablet Take 150 mg by mouth See Admin Instructions One dose prn yeast infection      gabapentin (NEURONTIN) 100 MG capsule Take 2 capsules (200 mg) by mouth 2 times daily     glipiZIDE (GLUCOTROL) 10 MG tablet Take 1 tablet (10 mg) by mouth 2 times daily (before meals)     guaiFENesin-dextromethorphan (ROBITUSSIN DM) 100-10 MG/5ML syrup Take 10 mLs by mouth every 4 hours as needed for cough or congestion (productive cough)     JARDIANCE 25 MG TABS tablet Take 25 mg by mouth daily      lamoTRIgine (LAMICTAL) 100 MG tablet Take 100 mg by mouth 2 times daily     lidocaine (LMX4) 4 % external cream Apply topically 4  "times daily as needed for mild pain or moderate pain Apply to painful areas, avoid red or open skin, avoid heat over ointment     metFORMIN (GLUCOPHAGE) 500 MG tablet Take 0.5 tablets (250 mg) by mouth 2 times daily (with meals)     methocarbamol (ROBAXIN) 500 MG tablet Take 1 tablet (500 mg) by mouth 4 times daily (Patient taking differently: Take 750 mg by mouth 4 times daily )     metoclopramide (REGLAN) 10 MG tablet Take 10 mg by mouth 3 times daily as needed Before meals as needed.     nystatin (MYCOSTATIN) 187888 UNIT/GM external powder Apply topically 2 times daily as needed Breast rash     omeprazole (PRILOSEC) 20 MG DR capsule Take 20 mg by mouth daily      order for DME Equipment being ordered: wrist brace     oxybutynin ER (DITROPAN-XL) 10 MG 24 hr tablet Take 10 mg by mouth At Bedtime      oxyCODONE-acetaminophen (PERCOCET) 5-325 MG tablet Take 1 tablet by mouth 3 times daily. May also take 1 tablet every 4 hours as needed for severe pain. Scheduled times are 6 am, 2 pm, and 10 pm     predniSONE (DELTASONE) 1 MG tablet Take 6 mg by mouth daily 1mg tab and 5mg tab     triamcinolone (KENALOG) 0.1 % external cream Apply topically 2 times daily as needed      triamterene-HCTZ (MAXZIDE-25) 37.5-25 MG tablet Take 1 tablet by mouth daily     vitamin D3 (CHOLECALCIFEROL) 50 mcg (2000 units) tablet Take 3 tablets by mouth daily     No current facility-administered medications for this visit.     ALLERGIES:   Allergies   Allergen Reactions     Propofol Nausea and Vomiting     Shellfish Allergy      Other reaction(s): Dizziness       DIET: Diabetic, regular texture, thin liquids.    Vitals:    03/25/22 1348   BP: 138/65   Pulse: 77   Resp: 18   Temp: 97.3  F (36.3  C)   SpO2: 92%   Weight: 86.5 kg (190 lb 9.6 oz)   Height: 1.651 m (5' 5\")     Body mass index is 31.72 kg/m .    EXAMINATION:   General: Pleasant, very loquacious elderly female, sitting in the chair but would be better served with a recliner.   Head: " Normocephalic and atraumatic.   Eyes: PERRLA, sclerae clear.   ENT: Moist oral mucosa.  Upper dentures and some remaining lower teeth.  Cardiovascular: Regular rate and rhythm with a 2/6 systolic ejection murmur with split S2 at the left sternal border.   Respiratory: Lungs clear to auscultation bilaterally.   Abdomen: Nondistended.   Musculoskeletal/Extremities: Age-related degenerative joint disease.  Bilateral trace to 1+ pedal edema.  Integument: Remnants of right antecubital rash.  Cognitive/Psychiatric: Alert and oriented x4.  Affect is euthymic.    DIAGNOSTICS:   No results found for this or any previous visit (from the past 240 hour(s)).     Last Comprehensive Metabolic Panel:  Sodium   Date Value Ref Range Status   03/07/2022 139 136 - 145 mmol/L Final   10/24/2017 139 133 - 144 mmol/L Final     Potassium   Date Value Ref Range Status   03/07/2022 3.8 3.5 - 5.0 mmol/L Final   10/24/2017 3.9 3.4 - 5.3 mmol/L Final     Chloride   Date Value Ref Range Status   03/07/2022 100 98 - 107 mmol/L Final   10/24/2017 101 94 - 109 mmol/L Final     Carbon Dioxide   Date Value Ref Range Status   10/24/2017 28 20 - 32 mmol/L Final     Carbon Dioxide (CO2)   Date Value Ref Range Status   03/07/2022 25 22 - 31 mmol/L Final     Anion Gap   Date Value Ref Range Status   03/07/2022 14 5 - 18 mmol/L Final   10/24/2017 10 3 - 14 mmol/L Final     Glucose   Date Value Ref Range Status   03/07/2022 282 (H) 70 - 125 mg/dL Final   10/24/2017 116 (H) 70 - 99 mg/dL Final     GLUCOSE BY METER POCT   Date Value Ref Range Status   03/13/2022 176 (H) 70 - 99 mg/dL Final     Urea Nitrogen   Date Value Ref Range Status   03/07/2022 23 8 - 28 mg/dL Final   10/24/2017 18 7 - 30 mg/dL Final     Creatinine   Date Value Ref Range Status   03/07/2022 1.07 0.60 - 1.10 mg/dL Final   10/24/2017 0.84 0.52 - 1.04 mg/dL Final     GFR Estimate   Date Value Ref Range Status   03/07/2022 53 (L) >60 mL/min/1.73m2 Final     Comment:     Effective December  21, 2021 eGFRcr in adults is calculated using the 2021 CKD-EPI creatinine equation which includes age and gender (Teena et al., [place], DOI: 10.1056/FYHDsj3611020)   12/29/2020 >60 >60 mL/min/1.73m2 Final   10/24/2017 66 >60 mL/min/1.7m2 Final     Comment:     Non  GFR Calc     Calcium   Date Value Ref Range Status   03/07/2022 9.0 8.5 - 10.5 mg/dL Final   10/24/2017 9.5 8.5 - 10.1 mg/dL Final     Bilirubin Total   Date Value Ref Range Status   07/19/2021 0.7 0.0 - 1.0 mg/dL Final   02/14/2017 0.4 0.2 - 1.3 mg/dL Final     Alkaline Phosphatase   Date Value Ref Range Status   07/19/2021 159 (H) 45 - 120 U/L Final   02/14/2017 85 40 - 150 U/L Final     ALT   Date Value Ref Range Status   07/19/2021 52 (H) 0 - 45 U/L Final   02/14/2017 26 0 - 50 U/L Final     AST   Date Value Ref Range Status   07/19/2021 28 0 - 40 U/L Final   02/14/2017 25 0 - 45 U/L Final       Lab Results   Component Value Date    WBC 8.9 03/11/2022    WBC 8.9 04/12/2016     Lab Results   Component Value Date    RBC 3.86 03/11/2022    RBC 3.99 04/12/2016     Lab Results   Component Value Date    HGB 8.7 03/11/2022    HGB 12.2 10/24/2017     Lab Results   Component Value Date    HCT 32.4 03/11/2022    HCT 36.0 04/12/2016     Lab Results   Component Value Date    MCV 84 03/11/2022    MCV 90 04/12/2016     Lab Results   Component Value Date    MCH 22.5 03/11/2022    MCH 26.1 04/12/2016     Lab Results   Component Value Date    MCHC 26.9 03/11/2022    MCHC 28.9 04/12/2016     Lab Results   Component Value Date    RDW 16.8 03/11/2022    RDW 15.8 04/12/2016     Lab Results   Component Value Date     03/11/2022     04/12/2016       ASSESSMENT/Plan:      ICD-10-CM    1. Pneumonia of right lower lobe due to infectious organism  J18.9    2. Severe sepsis (H)  A41.9     R65.20    3. Neural foraminal stenosis of cervical spine, bilateral  M48.02    4. Paravertebral mass  R22.2    5. Multilevel spondylosis  M47.819    6. Type 2  diabetes mellitus with hyperglycemia, unspecified whether long term insulin use (H)  E11.65    7. Spasm of back muscles  M62.830    8. Bilateral hearing loss, unspecified hearing loss type  H91.93    9. Bipolar 1 disorder, depressed, full remission (H)  F31.76    10. Meniere's disease (cochlear hydrops) of both ears - on prednisone and triamterene hydrocholorothiazide  H81.03    11. Anxiety and depression  F41.9     F32.A    12. Essential hypertension  I10    13. Chronic bilateral back pain, unspecified back location  M54.9     G89.29    14. Mixed hyperlipidemia  E78.2    15. Slow transit constipation  K59.01         CHANGES:    None.    CARE PLAN:    The care plan, medications, vital signs, orders, and nursing notes have been reviewed, and all orders signed. Changes to care plan, if any, as noted. Otherwise, continue current plan of care.      The above has been created using voice recognition software. Please be aware that this may unintentionally  produce inaccuracies and/or nonsensical sentences.    I was present with the medical student/advanced practice registered nurse, Marce Elliott RN, who participated in the service and in the documentation of this note.  I have verified the history and personally performed the physical exam and medical decision making.  I agree with the assessment and plan of care as documented in the note.      Electronically signed by: JUAN ANTONIO Ching CNP

## 2022-03-29 ENCOUNTER — TRANSITIONAL CARE UNIT VISIT (OUTPATIENT)
Dept: GERIATRICS | Facility: CLINIC | Age: 82
End: 2022-03-29
Payer: MEDICARE

## 2022-03-29 VITALS
OXYGEN SATURATION: 92 % | TEMPERATURE: 98.1 F | WEIGHT: 194.8 LBS | RESPIRATION RATE: 18 BRPM | SYSTOLIC BLOOD PRESSURE: 135 MMHG | HEART RATE: 75 BPM | HEIGHT: 65 IN | BODY MASS INDEX: 32.46 KG/M2 | DIASTOLIC BLOOD PRESSURE: 75 MMHG

## 2022-03-29 DIAGNOSIS — M48.02 NEURAL FORAMINAL STENOSIS OF CERVICAL SPINE: ICD-10-CM

## 2022-03-29 DIAGNOSIS — E78.2 MIXED HYPERLIPIDEMIA: ICD-10-CM

## 2022-03-29 DIAGNOSIS — R65.20 SEVERE SEPSIS (H): ICD-10-CM

## 2022-03-29 DIAGNOSIS — M47.819 MULTILEVEL SPONDYLOSIS: ICD-10-CM

## 2022-03-29 DIAGNOSIS — M54.9 CHRONIC BILATERAL BACK PAIN, UNSPECIFIED BACK LOCATION: ICD-10-CM

## 2022-03-29 DIAGNOSIS — H91.93 BILATERAL HEARING LOSS, UNSPECIFIED HEARING LOSS TYPE: ICD-10-CM

## 2022-03-29 DIAGNOSIS — M62.830 SPASM OF BACK MUSCLES: ICD-10-CM

## 2022-03-29 DIAGNOSIS — F32.A ANXIETY AND DEPRESSION: ICD-10-CM

## 2022-03-29 DIAGNOSIS — I10 ESSENTIAL HYPERTENSION: ICD-10-CM

## 2022-03-29 DIAGNOSIS — H81.03 COCHLEAR HYDROPS OF BOTH EARS: ICD-10-CM

## 2022-03-29 DIAGNOSIS — R22.2 PARAVERTEBRAL MASS: ICD-10-CM

## 2022-03-29 DIAGNOSIS — E11.65 TYPE 2 DIABETES MELLITUS WITH HYPERGLYCEMIA, UNSPECIFIED WHETHER LONG TERM INSULIN USE (H): ICD-10-CM

## 2022-03-29 DIAGNOSIS — K59.01 SLOW TRANSIT CONSTIPATION: ICD-10-CM

## 2022-03-29 DIAGNOSIS — F31.76 BIPOLAR 1 DISORDER, DEPRESSED, FULL REMISSION (H): ICD-10-CM

## 2022-03-29 DIAGNOSIS — F41.9 ANXIETY AND DEPRESSION: ICD-10-CM

## 2022-03-29 DIAGNOSIS — A41.9 SEVERE SEPSIS (H): ICD-10-CM

## 2022-03-29 DIAGNOSIS — G89.29 CHRONIC BILATERAL BACK PAIN, UNSPECIFIED BACK LOCATION: ICD-10-CM

## 2022-03-29 DIAGNOSIS — J18.9 PNEUMONIA OF RIGHT LOWER LOBE DUE TO INFECTIOUS ORGANISM: Primary | ICD-10-CM

## 2022-03-29 PROCEDURE — 99309 SBSQ NF CARE MODERATE MDM 30: CPT | Performed by: NURSE PRACTITIONER

## 2022-03-29 NOTE — PROGRESS NOTES
[facility]    Name:   Tonya Yang  :   1940  MRN:    [id number]     Facility:   [facility] ([facility] [place]) [[id number]]   Room: [facility] [place] / [address]  Code Status: DNR and POLST AVAILABLE - Had been full code in the hospital    DOS:  2022  Previous visit: 2022    PCP:  Ananya Mclean NP     CHIEF COMPLAINT / REASON FOR VISIT:  Chief Complaint   Patient presents with     Clinic Care Coordination - Follow-up     CAP, severe sepsis, acute on chronic back pain      [facility] from 2022 until 2022 (pneumonia, severe sepsis, acute on chronic back pain)      HPI: Tonya is a 81 year old female with a past medical history significant for severe spinal stenosis (multilevel), polyarthralgia, Ménière's disease/cochlear hydrops, diabetes mellitus type 2 (with nephropathy), who presented with acute on chronic back pain that had become worse over the preceding months, and she could no longer ambulate with her cane and became essentially bedbound.  Further work-up in the ER was concerning for lactic acidosis, leukocytosis, and CXR concerning for medial right lower lobe pneumonia.  Treated with a fluid bolus, pain medications, and Zosyn and vancomycin, ultimately admitted for further work-up.    She was found to have leukocytosis and lactic acidosis, and a CXR was concerning for right-sided pneumonia.  She had a cough for about a week with symptoms of regurgitation.  A blood culture was negative.  Diagnosed with CAP and severe sepsis, she was treated with ceftriaxone and azithromycin with a transition to oral Augmentin.    She has had chronic back pain for some time.  Further work-up in the ER was concerning for severe multilevel spinal stenosis with bilateral neuroforaminal stenosis and compression of bilateral exiting cauda equina nerve roots, left greater than right.  Spine surgery was consulted with ensuing discussion with the patient and her son,  and it was recommended that she recover from pneumonia and then reassess the need for surgical intervention.  Later during her stay, she underwent an epidural steroid injection (03/11) with good effect, and it made it possible for her to participate with therapy and ambulate easier.    Incidental finding on MRI of the thoracic spine showed a small, well-circumscribed enhancing lesion along the left anterior lateral paravertebral soft tissue, measuring 1.2 x 1.1 x 1.4 cm.  Nonspecific finding could represent neurofibroma/schwannoma, nerve sheath tumor, metastatic lymph node, or atypical metastatic mass.  Radiologist recommended CT chest for further assessment.  This was ordered by neurosurgery, and it showed a mass in the last anterior paravertebral fat posterior to the descending thoracic aorta, most likely representing lymph node with recommendation for 6 months follow-up CT.    She also has essential hypertension that has required multiple medications, including triamterene hydrochlorothiazide (which she takes for Ménière's disease) and increasing doses of amlodipine.  She was started on carvedilol with good control and was kept on this at discharge.    As noted, she also has Ménière's disease for which, in addition to the triamterene hydrochlorothiazide, she also takes prednisone.  She stated that the condition is not severe and is only problematic if she gets up too fast.    She has a mood disorder for which she receives duloxetine, Lamictal, and as needed lorazepam for anxiety.    Overactive bladder places her on oxybutynin.  Occasional urge incontinence.    Diagnosis of GERD, and she did have symptoms of food stuck in her esophagus.  A swallow study was not concerning for aspiration.  Consider follow-up with GI if symptoms persist or recur and you may want to consider esophagogram to evaluate for motility issues.     She developed a right antecubital skin rash, likely secondary to tape.  Orders were given for  "0.1% triamcinolone cream.    She has diabetes mellitus type 2 with hyperglycemia.  Her last A1c was 8.8.  She was on insulin while inpatient, and hyperglycemia was felt secondary to a steroid injection, and she continued to improve.  She is adamant against giving herself insulin.  She was ultimately ready for discharge to the TCU.    Suspected chronic kidney disease.  While she was not prescribed an ACE/ARB in the hospital, CKD was listed as the reason.  We do not yet have 3 recent metabolic profile.      CURRENT/RECENT TCU ISSUES    Disposition: She is seen today in her room.  We communicate with a combination of lipreading from a distance and whiteboard writing.  She complains she is still in a lot of pain in the neck and left arm.  She is a clock watcher.  Nursing staff say she requests Percocet almost exactly every four hours (when it is due). The nurse states \"that's all she thinks about is, when her next pain pill is due?\" She says her muscle relaxant makes her sleepy, and last visit it was decreased after increasing the dose to 750 mg, we decreased it back to the 500 mg dose, at which point, she stated that it helped with her drowsiness. Therapy is present in the beginning of the visit and he states that she is now dragging her right foot while ambulating and was not doing this when first admitted. The patient believes it is her left leg that is weaker, she states she feels it is going to \"buckle\".     Tonya was discharged in the hospital discharge summary as full code; however, at this juncture she would prefer to be DNR.  She appears to play the role of drama queen during our initial visit and it does a good deal of coughing (although her lungs are clear).  She is extremely loquacious, and it is often difficult to get a word in [employer] wise.    Pain management: TODAY, she endorses pain in the neck and both arms. She also endorses pain at the waist but says her pain is relieved while sitting or lying down. She " "has orders for muscle relaxers (methocarbamol 500 mg 4 times daily, scheduled oxycodone 5 mg every 6 hours and Percocet (5/325 mg every 4 hours as needed). She states her pain can \"make her lose her mind\". She stated she saw [facility] pain clinic in [place] and they explored instilling a pump, however, she was not amenable to this since she is more susceptible to infection due to her chronic prednisone use.  I suggested an alternative, and we are going to try meloxicam 7.5 mg daily.    Anxiety: She is currently experiencing a good deal of anxiety.  She has orders for as needed diazepam (0.5 mg twice daily) and she might very well benefit from scheduling every 12 hours.  She does find this effective, although she told me she really wasn't taking it at home.    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  Still, she does manage to hear well enough to carry on a conversation, occasionally needing to resort to writing things down.    Chronic kidney disease: Not quite sure.  On 03/07, her creatinine was 1.07.    Constipation: She earlier endorsed constipation, describing \"meatballs.\"  Now, she has loose stools.  When seen by Dr. Silverman on 03/18, they did discuss the possibility of giving Tonya a probiotic and, if the stools became worse and more watery, her stool would be tested for C. Difficile. She states she has a history of IBS which has mostly been diarrhea, however, she now states she has constipation.     Diabetes mellitus type 2 and antecubital rash: She is getting a steroids for her rash and states that it is raising her blood glucose levels; however, she has been on oral medications at home, recently presenting with an A1c of 8.8 as noted.  She also had some glycosuria in the hospital.  She adamantly refuses to accept any idea of giving herself insulin at home.  Her rash has now resolved.    Contacts: Davie, her son ([phone]). We had a thorough discussion today regarding pain management for " her mother, it was recommended that she seek consult from her previous pain clinic ([facility]), or a new one.    ROS:    Positives: As described above.  Also, rash under breasts and to groin.  Negatives: No headaches or chest pains, coughing or congestion, nausea or vomiting, dyspnea, dysuria, difficulty chewing or swallowing, or any problems with appetite or sleep.      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     Hepatic abscess      Hyperlipidemia      Hypertension      Irritable bowel syndrome      Meniere disease      Neuropathy      Noninfectious ileitis      Peritoneal abscess (H)      Spinal stenosis of lumbar region      Steroid long-term use      Vaginitis, atrophic, postmenopausal      Vitamin D deficiency               Family History   Problem Relation Age of Onset     Cerebrovascular Disease Mother      Heart Disease Mother      Heart Disease Father      Heart Disease Brother      Diabetes No family hx of      Coronary Artery Disease No family hx of      Hypertension No family hx of      Hyperlipidemia No family hx of      Breast Cancer No family hx of      Colon Cancer No family hx of      Prostate Cancer No family hx of      Other Cancer No family hx of      Depression No family hx of      Anxiety Disorder No family hx of      Mental Illness No family hx of      Substance Abuse No family hx of      Anesthesia Reaction No family hx of      Asthma No family hx of      Osteoporosis No family hx of      Genetic Disorder No family hx of      Thyroid Disease No family hx of      Obesity No family hx of      Unknown/Adopted No family hx of       Social History     Socioeconomic History     Marital status:      Spouse name: None     Number of children: None     Years of education: None     Highest education level: None   Occupational History     None   Tobacco Use     Smoking status: Former Smoker     Types: Cigarettes     Quit date: 11/15/1987     Years since quittin.3     Smokeless tobacco: Former User   Substance and Sexual Activity     Alcohol use: Not Currently     Alcohol/week: 0.0 standard drinks     Comment: MALISSA Ferreirapaco since      Drug use: No     Comment: used marijuanna in the past     Sexual activity: Never     Partners: Male   Other Topics Concern     Parent/sibling w/ CABG, MI or angioplasty before 65F 55M? No   Social History Narrative     None     Social Determinants of Health     Financial Resource Strain: Not on file   Food Insecurity: Not on file   Transportation Needs: Not on file   Physical Activity: Not on file   Stress: Not on file   Social Connections: Not on file   Intimate Partner Violence: Not on file   Housing Stability: Not on file       MEDICATIONS: Reviewed from the MAR, physician orders, and/or earlier progress notes.  Post Discharge Medication Reconciliation Status: medication reconcilation previously completed during another office visit.    Current Outpatient Medications   Medication Sig     ACE/ARB NOT PRESCRIBED, INTENTIONAL, 1 each continuous prn ACE & ARB not prescribed due to CKD (Chronic Kidney Disease)     acetaminophen (TYLENOL) 325 MG tablet Take 1-2 tablets (325-650 mg) by mouth every 6 hours as needed for mild pain     amLODIPine (NORVASC) 10 MG tablet Take 1 tablet (10 mg) by mouth daily     atorvastatin (LIPITOR) 10 MG tablet Take 10 mg by mouth At Bedtime     blood glucose (ACCU-CHEK FASTCLIX) lancing device FOR TESTING ONCE DAILY. DX  E11.9 TYPE 2 DIABETES     blood glucose (CONTOUR TEST) test strip TESTING EVERY DAY DX  E11.9     carvedilol (COREG) 3.125 MG tablet Take 1 tablet (3.125  mg) by mouth 2 times daily (with meals)     CONTOUR NEXT TEST test strip TESTING EVERY DAY DX  E11.9     cyanocobalamin 1000 MCG SUBL Place 1,000 mcg under the tongue daily     diazepam (VALIUM) 1 MG/ML solution Take 0.5 mLs (0.5 mg) by mouth 2 times daily as needed for agitation, anxiety or muscle spasms (Patient taking differently: Take 0.5 mg by mouth every 12 hours )     DULoxetine (CYMBALTA) 60 MG EC capsule TAKE 1 CAPSULE (60 MG) BY MOUTH DAILY     fluconazole (DIFLUCAN) 150 MG tablet Take 150 mg by mouth See Admin Instructions One dose prn yeast infection      gabapentin (NEURONTIN) 100 MG capsule Take 2 capsules (200 mg) by mouth 2 times daily     glipiZIDE (GLUCOTROL) 10 MG tablet Take 1 tablet (10 mg) by mouth 2 times daily (before meals)     guaiFENesin-dextromethorphan (ROBITUSSIN DM) 100-10 MG/5ML syrup Take 10 mLs by mouth every 4 hours as needed for cough or congestion (productive cough)     JARDIANCE 25 MG TABS tablet Take 25 mg by mouth daily      lamoTRIgine (LAMICTAL) 100 MG tablet Take 100 mg by mouth 2 times daily     lidocaine (LMX4) 4 % external cream Apply topically 4 times daily as needed for mild pain or moderate pain Apply to painful areas, avoid red or open skin, avoid heat over ointment     metFORMIN (GLUCOPHAGE) 500 MG tablet Take 0.5 tablets (250 mg) by mouth 2 times daily (with meals)     methocarbamol (ROBAXIN) 500 MG tablet Take 1 tablet (500 mg) by mouth 4 times daily (Patient taking differently: Take 750 mg by mouth 4 times daily )     metoclopramide (REGLAN) 10 MG tablet Take 10 mg by mouth 3 times daily as needed Before meals as needed.     nystatin (MYCOSTATIN) 765836 UNIT/GM external powder Apply topically 2 times daily as needed Breast rash     omeprazole (PRILOSEC) 20 MG DR capsule Take 20 mg by mouth daily      order for DME Equipment being ordered: wrist brace     oxybutynin ER (DITROPAN-XL) 10 MG 24 hr tablet Take 10 mg by mouth At Bedtime      oxyCODONE-acetaminophen  "(PERCOCET) 5-325 MG tablet Take 1 tablet by mouth 3 times daily. May also take 1 tablet every 4 hours as needed for severe pain. Scheduled times are 6 am, 2 pm, and 10 pm     predniSONE (DELTASONE) 1 MG tablet Take 6 mg by mouth daily 1mg tab and 5mg tab     triamcinolone (KENALOG) 0.1 % external cream Apply topically 2 times daily as needed      triamterene-HCTZ (MAXZIDE-25) 37.5-25 MG tablet Take 1 tablet by mouth daily     vitamin D3 (CHOLECALCIFEROL) 50 mcg (2000 units) tablet Take 3 tablets by mouth daily     No current facility-administered medications for this visit.     ALLERGIES:   Allergies   Allergen Reactions     Propofol Nausea and Vomiting     Shellfish Allergy      Other reaction(s): Dizziness       DIET: Diabetic, regular texture, thin liquids.    Vitals:    03/29/22 1109   BP: 135/75   Pulse: 75   Resp: 18   Temp: 98.1  F (36.7  C)   SpO2: 92%   Weight: 88.4 kg (194 lb 12.8 oz)   Height: 1.651 m (5' 5\")     Body mass index is 32.42 kg/m .    EXAMINATION:   General: Pleasant, very loquacious elderly female, sitting in the chair but would be better served with a recliner.   Head: Normocephalic and atraumatic.   Eyes: PERRLA, sclerae clear.   ENT: Moist oral mucosa.  Upper dentures and some remaining lower teeth.  Cardiovascular: Regular rate and rhythm with a 2/6 systolic ejection murmur with split S2 at the left sternal border.   Respiratory: Lungs clear to auscultation bilaterally.   Abdomen: Nondistended.   Musculoskeletal/Extremities: Age-related degenerative joint disease.  Bilateral trace to 1+ pedal edema.  Integument: Remnants of right antecubital rash.  Cognitive/Psychiatric: Alert and oriented x4.  Affect is euthymic.    DIAGNOSTICS:   No results found for this or any previous visit (from the past 240 hour(s)).     Last Comprehensive Metabolic Panel:  Sodium   Date Value Ref Range Status   03/07/2022 139 136 - 145 mmol/L Final   10/24/2017 139 133 - 144 mmol/L Final     Potassium   Date " Value Ref Range Status   03/07/2022 3.8 3.5 - 5.0 mmol/L Final   10/24/2017 3.9 3.4 - 5.3 mmol/L Final     Chloride   Date Value Ref Range Status   03/07/2022 100 98 - 107 mmol/L Final   10/24/2017 101 94 - 109 mmol/L Final     Carbon Dioxide   Date Value Ref Range Status   10/24/2017 28 20 - 32 mmol/L Final     Carbon Dioxide (CO2)   Date Value Ref Range Status   03/07/2022 25 22 - 31 mmol/L Final     Anion Gap   Date Value Ref Range Status   03/07/2022 14 5 - 18 mmol/L Final   10/24/2017 10 3 - 14 mmol/L Final     Glucose   Date Value Ref Range Status   03/07/2022 282 (H) 70 - 125 mg/dL Final   10/24/2017 116 (H) 70 - 99 mg/dL Final     GLUCOSE BY METER POCT   Date Value Ref Range Status   03/13/2022 176 (H) 70 - 99 mg/dL Final     Urea Nitrogen   Date Value Ref Range Status   03/07/2022 23 8 - 28 mg/dL Final   10/24/2017 18 7 - 30 mg/dL Final     Creatinine   Date Value Ref Range Status   03/07/2022 1.07 0.60 - 1.10 mg/dL Final   10/24/2017 0.84 0.52 - 1.04 mg/dL Final     GFR Estimate   Date Value Ref Range Status   03/07/2022 53 (L) >60 mL/min/1.73m2 Final     Comment:     Effective December 21, 2021 eGFRcr in adults is calculated using the 2021 CKD-EPI creatinine equation which includes age and gender (Teena et al., NEJM, DOI: 10.1056/XWZJfu6623817)   12/29/2020 >60 >60 mL/min/1.73m2 Final   10/24/2017 66 >60 mL/min/1.7m2 Final     Comment:     Non  GFR Calc     Calcium   Date Value Ref Range Status   03/07/2022 9.0 8.5 - 10.5 mg/dL Final   10/24/2017 9.5 8.5 - 10.1 mg/dL Final     Bilirubin Total   Date Value Ref Range Status   07/19/2021 0.7 0.0 - 1.0 mg/dL Final   02/14/2017 0.4 0.2 - 1.3 mg/dL Final     Alkaline Phosphatase   Date Value Ref Range Status   07/19/2021 159 (H) 45 - 120 U/L Final   02/14/2017 85 40 - 150 U/L Final     ALT   Date Value Ref Range Status   07/19/2021 52 (H) 0 - 45 U/L Final   02/14/2017 26 0 - 50 U/L Final     AST   Date Value Ref Range Status   07/19/2021 28 0 -  40 U/L Final   02/14/2017 25 0 - 45 U/L Final       Lab Results   Component Value Date    WBC 8.9 03/11/2022    WBC 8.9 04/12/2016     Lab Results   Component Value Date    RBC 3.86 03/11/2022    RBC 3.99 04/12/2016     Lab Results   Component Value Date    HGB 8.7 03/11/2022    HGB 12.2 10/24/2017     Lab Results   Component Value Date    HCT 32.4 03/11/2022    HCT 36.0 04/12/2016     Lab Results   Component Value Date    MCV 84 03/11/2022    MCV 90 04/12/2016     Lab Results   Component Value Date    MCH 22.5 03/11/2022    MCH 26.1 04/12/2016     Lab Results   Component Value Date    MCHC 26.9 03/11/2022    MCHC 28.9 04/12/2016     Lab Results   Component Value Date    RDW 16.8 03/11/2022    RDW 15.8 04/12/2016     Lab Results   Component Value Date     03/11/2022     04/12/2016       ASSESSMENT/Plan:      ICD-10-CM    1. Pneumonia of right lower lobe due to infectious organism  J18.9    2. Severe sepsis (H)  A41.9     R65.20    3. Neural foraminal stenosis of cervical spine, bilateral  M48.02    4. Paravertebral mass  R22.2    5. Multilevel spondylosis  M47.819    6. Type 2 diabetes mellitus with hyperglycemia, unspecified whether long term insulin use (H)  E11.65    7. Spasm of back muscles  M62.830    8. Bilateral hearing loss, unspecified hearing loss type  H91.93    9. Bipolar 1 disorder, depressed, full remission (H)  F31.76    10. Meniere's disease (cochlear hydrops) of both ears - on prednisone and triamterene hydrocholorothiazide  H81.03    11. Anxiety and depression  F41.9     F32.A    12. Essential hypertension  I10    13. Chronic bilateral back pain, unspecified back location  M54.9     G89.29    14. Mixed hyperlipidemia  E78.2    15. Slow transit constipation  K59.01         CHANGES:    Meloxicam 7.5 mg tab daily.    CARE PLAN:    The care plan, medications, vital signs, orders, and nursing notes have been reviewed, and all orders signed. Changes to care plan, if any, as noted.  Otherwise, continue current plan of care.      The above has been created using voice recognition software. Please be aware that this may unintentionally  produce inaccuracies and/or nonsensical sentences.    I was present with the medical student/advanced practice registered nurse, Marce Elliott RN, who participated in the service and in the documentation of this note.  I have verified the history and personally performed the physical exam and medical decision making.  I agree with the assessment and plan of care as documented in the note.      Electronically signed by: JUAN ANTONIO Ching CNP

## 2022-03-29 NOTE — LETTER
3/29/2022        RE: Tonya Yang  [address]  [place] [address]        [facility]    Name:   Tonya Yang  :   1940  MRN:    [id number]     Facility:   [facility]) [[id number]]   Room: [place] [address]  Code Status: DNR and POLST AVAILABLE - Had been full code in the hospital    DOS:  2022  Previous visit: 2022    PCP:  Ananya Mclean NP     CHIEF COMPLAINT / REASON FOR VISIT:  Chief Complaint   Patient presents with     Clinic Care Coordination - Follow-up     CAP, severe sepsis, acute on chronic back pain      [facility] from 2022 until 2022 (pneumonia, severe sepsis, acute on chronic back pain)      HPI: Tonya is a 81 year old female with a past medical history significant for severe spinal stenosis (multilevel), polyarthralgia, Ménière's disease/cochlear hydrops, diabetes mellitus type 2 (with nephropathy), who presented with acute on chronic back pain that had become worse over the preceding months, and she could no longer ambulate with her cane and became essentially bedbound.  Further work-up in the ER was concerning for lactic acidosis, leukocytosis, and CXR concerning for medial right lower lobe pneumonia.  Treated with a fluid bolus, pain medications, and Zosyn and vancomycin, ultimately admitted for further work-up.    She was found to have leukocytosis and lactic acidosis, and a CXR was concerning for right-sided pneumonia.  She had a cough for about a week with symptoms of regurgitation.  A blood culture was negative.  Diagnosed with CAP and severe sepsis, she was treated with ceftriaxone and azithromycin with a transition to oral Augmentin.    She has had chronic back pain for some time.  Further work-up in the ER was concerning for severe multilevel spinal stenosis with bilateral neuroforaminal stenosis and compression of bilateral exiting cauda equina nerve roots, left greater  than right.  Spine surgery was consulted with ensuing discussion with the patient and her son, and it was recommended that she recover from pneumonia and then reassess the need for surgical intervention.  Later during her stay, she underwent an epidural steroid injection (03/11) with good effect, and it made it possible for her to participate with therapy and ambulate easier.    Incidental finding on MRI of the thoracic spine showed a small, well-circumscribed enhancing lesion along the left anterior lateral paravertebral soft tissue, measuring 1.2 x 1.1 x 1.4 cm.  Nonspecific finding could represent neurofibroma/schwannoma, nerve sheath tumor, metastatic lymph node, or atypical metastatic mass.  Radiologist recommended CT chest for further assessment.  This was ordered by neurosurgery, and it showed a mass in the last anterior paravertebral fat posterior to the descending thoracic aorta, most likely representing lymph node with recommendation for 6 months follow-up CT.    She also has essential hypertension that has required multiple medications, including triamterene hydrochlorothiazide (which she takes for Ménière's disease) and increasing doses of amlodipine.  She was started on carvedilol with good control and was kept on this at discharge.    As noted, she also has Ménière's disease for which, in addition to the triamterene hydrochlorothiazide, she also takes prednisone.  She stated that the condition is not severe and is only problematic if she gets up too fast.    She has a mood disorder for which she receives duloxetine, Lamictal, and as needed lorazepam for anxiety.    Overactive bladder places her on oxybutynin.  Occasional urge incontinence.    Diagnosis of GERD, and she did have symptoms of food stuck in her esophagus.  A swallow study was not concerning for aspiration.  Consider follow-up with GI if symptoms persist or recur and you may want to consider esophagogram to evaluate for motility issues.    "  She developed a right antecubital skin rash, likely secondary to tape.  Orders were given for 0.1% triamcinolone cream.    She has diabetes mellitus type 2 with hyperglycemia.  Her last A1c was 8.8.  She was on insulin while inpatient, and hyperglycemia was felt secondary to a steroid injection, and she continued to improve.  She is adamant against giving herself insulin.  She was ultimately ready for discharge to the TCU.    Suspected chronic kidney disease.  While she was not prescribed an ACE/ARB in the hospital, CKD was listed as the reason.  We do not yet have 3 recent metabolic profile.      CURRENT/RECENT TCU ISSUES    Disposition: She is seen today in her room.  We communicate with a combination of lipreading from a distance and whiteboard writing.  She complains she is still in a lot of pain in the neck and left arm.  She is a clock watcher.  Nursing staff say she requests Percocet almost exactly every four hours (when it is due). The nurse states \"that's all she thinks about is, when her next pain pill is due?\" She says her muscle relaxant makes her sleepy, and last visit it was decreased after increasing the dose to 750 mg, we decreased it back to the 500 mg dose, at which point, she stated that it helped with her drowsiness. Therapy is present in the beginning of the visit and he states that she is now dragging her right foot while ambulating and was not doing this when first admitted. The patient believes it is her left leg that is weaker, she states she feels it is going to \"buckle\".     Tonya was discharged in the hospital discharge summary as full code; however, at this juncture she would prefer to be DNR.  She appears to play the role of drama queen during our initial visit and it does a good deal of coughing (although her lungs are clear).  She is extremely loquacious, and it is often difficult to get a word in edge wise.    Pain management: TODAY, she endorses pain in the neck and both arms. She " "also endorses pain at the waist but says her pain is relieved while sitting or lying down. She has orders for muscle relaxers (methocarbamol 500 mg 4 times daily, scheduled oxycodone 5 mg every 6 hours and Percocet (5/325 mg every 4 hours as needed). She states her pain can \"make her lose her mind\". She stated she saw [facility] pain clinic in [place] and they explored instilling a pump, however, she was not amenable to this since she is more susceptible to infection due to her chronic prednisone use.  I suggested an alternative, and we are going to try meloxicam 7.5 mg daily.    Anxiety: She is currently experiencing a good deal of anxiety.  She has orders for as needed diazepam (0.5 mg twice daily) and she might very well benefit from scheduling every 12 hours.  She does find this effective, although she told me she really wasn't taking it at home.    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  Still, she does manage to hear well enough to carry on a conversation, occasionally needing to resort to writing things down.    Chronic kidney disease: Not quite sure.  On 03/07, her creatinine was 1.07.    Constipation: She earlier endorsed constipation, describing \"meatballs.\"  Now, she has loose stools.  When seen by Dr. Silverman on 03/18, they did discuss the possibility of giving Tonya a probiotic and, if the stools became worse and more watery, her stool would be tested for C. Difficile. She states she has a history of IBS which has mostly been diarrhea, however, she now states she has constipation.     Diabetes mellitus type 2 and antecubital rash: She is getting a steroids for her rash and states that it is raising her blood glucose levels; however, she has been on oral medications at home, recently presenting with an A1c of 8.8 as noted.  She also had some glycosuria in the hospital.  She adamantly refuses to accept any idea of giving herself insulin at home.  Her rash has now resolved.    Contacts: " Davie, her son ([phone]). We had a thorough discussion today regarding pain management for her mother, it was recommended that she seek consult from her previous pain clinic ([facility]), or a new one.    ROS:    Positives: As described above.  Also, rash under breasts and to groin.  Negatives: No headaches or chest pains, coughing or congestion, nausea or vomiting, dyspnea, dysuria, difficulty chewing or swallowing, or any problems with appetite or sleep.      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     Hepatic abscess      Hyperlipidemia      Hypertension      Irritable bowel syndrome      Meniere disease      Neuropathy      Noninfectious ileitis      Peritoneal abscess (H)      Spinal stenosis of lumbar region      Steroid long-term use      Vaginitis, atrophic, postmenopausal      Vitamin D deficiency               Family History   Problem Relation Age of Onset     Cerebrovascular Disease Mother      Heart Disease Mother      Heart Disease Father      Heart Disease Brother      Diabetes No family hx of      Coronary Artery Disease No family hx of      Hypertension No family hx of      Hyperlipidemia No family hx of      Breast Cancer No family hx of      Colon Cancer No family hx of      Prostate Cancer No family hx of      Other Cancer No family hx of      Depression No family hx of      Anxiety Disorder No family hx of      Mental Illness No family hx of      Substance Abuse No family hx of      Anesthesia Reaction No family hx of      Asthma No family hx of      Osteoporosis No family hx of      Genetic Disorder No family hx of       Thyroid Disease No family hx of      Obesity No family hx of      Unknown/Adopted No family hx of      Social History     Socioeconomic History     Marital status:      Spouse name: None     Number of children: None     Years of education: None     Highest education level: None   Occupational History     None   Tobacco Use     Smoking status: Former Smoker     Types: Cigarettes     Quit date: 11/15/1987     Years since quittin.3     Smokeless tobacco: Former User   Substance and Sexual Activity     Alcohol use: Not Currently     Alcohol/week: 0.0 standard drinks     Comment: AA -dober since      Drug use: No     Comment: used marijuanna in the past     Sexual activity: Never     Partners: Male   Other Topics Concern     Parent/sibling w/ CABG, MI or angioplasty before 65F 55M? No   Social History Narrative     None     Social Determinants of Health     Financial Resource Strain: Not on file   Food Insecurity: Not on file   Transportation Needs: Not on file   Physical Activity: Not on file   Stress: Not on file   Social Connections: Not on file   Intimate Partner Violence: Not on file   Housing Stability: Not on file       MEDICATIONS: Reviewed from the MAR, physician orders, and/or earlier progress notes.  Post Discharge Medication Reconciliation Status: medication reconcilation previously completed during another office visit.    Current Outpatient Medications   Medication Sig     ACE/ARB NOT PRESCRIBED, INTENTIONAL, 1 each continuous prn ACE & ARB not prescribed due to CKD (Chronic Kidney Disease)     acetaminophen (TYLENOL) 325 MG tablet Take 1-2 tablets (325-650 mg) by mouth every 6 hours as needed for mild pain     amLODIPine (NORVASC) 10 MG tablet Take 1 tablet (10 mg) by mouth daily     atorvastatin (LIPITOR) 10 MG tablet Take 10 mg by mouth At Bedtime     blood glucose (ACCU-CHEK FASTCLIX) lancing device FOR TESTING ONCE DAILY. DX  E11.9 TYPE 2 DIABETES     blood glucose (CONTOUR TEST)  test strip TESTING EVERY DAY DX  E11.9     carvedilol (COREG) 3.125 MG tablet Take 1 tablet (3.125 mg) by mouth 2 times daily (with meals)     CONTOUR NEXT TEST test strip TESTING EVERY DAY DX  E11.9     cyanocobalamin 1000 MCG SUBL Place 1,000 mcg under the tongue daily     diazepam (VALIUM) 1 MG/ML solution Take 0.5 mLs (0.5 mg) by mouth 2 times daily as needed for agitation, anxiety or muscle spasms (Patient taking differently: Take 0.5 mg by mouth every 12 hours )     DULoxetine (CYMBALTA) 60 MG EC capsule TAKE 1 CAPSULE (60 MG) BY MOUTH DAILY     fluconazole (DIFLUCAN) 150 MG tablet Take 150 mg by mouth See Admin Instructions One dose prn yeast infection      gabapentin (NEURONTIN) 100 MG capsule Take 2 capsules (200 mg) by mouth 2 times daily     glipiZIDE (GLUCOTROL) 10 MG tablet Take 1 tablet (10 mg) by mouth 2 times daily (before meals)     guaiFENesin-dextromethorphan (ROBITUSSIN DM) 100-10 MG/5ML syrup Take 10 mLs by mouth every 4 hours as needed for cough or congestion (productive cough)     JARDIANCE 25 MG TABS tablet Take 25 mg by mouth daily      lamoTRIgine (LAMICTAL) 100 MG tablet Take 100 mg by mouth 2 times daily     lidocaine (LMX4) 4 % external cream Apply topically 4 times daily as needed for mild pain or moderate pain Apply to painful areas, avoid red or open skin, avoid heat over ointment     metFORMIN (GLUCOPHAGE) 500 MG tablet Take 0.5 tablets (250 mg) by mouth 2 times daily (with meals)     methocarbamol (ROBAXIN) 500 MG tablet Take 1 tablet (500 mg) by mouth 4 times daily (Patient taking differently: Take 750 mg by mouth 4 times daily )     metoclopramide (REGLAN) 10 MG tablet Take 10 mg by mouth 3 times daily as needed Before meals as needed.     nystatin (MYCOSTATIN) 314756 UNIT/GM external powder Apply topically 2 times daily as needed Breast rash     omeprazole (PRILOSEC) 20 MG DR capsule Take 20 mg by mouth daily      order for DME Equipment being ordered: wrist brace     oxybutynin  "ER (DITROPAN-XL) 10 MG 24 hr tablet Take 10 mg by mouth At Bedtime      oxyCODONE-acetaminophen (PERCOCET) 5-325 MG tablet Take 1 tablet by mouth 3 times daily. May also take 1 tablet every 4 hours as needed for severe pain. Scheduled times are 6 am, 2 pm, and 10 pm     predniSONE (DELTASONE) 1 MG tablet Take 6 mg by mouth daily 1mg tab and 5mg tab     triamcinolone (KENALOG) 0.1 % external cream Apply topically 2 times daily as needed      triamterene-HCTZ (MAXZIDE-25) 37.5-25 MG tablet Take 1 tablet by mouth daily     vitamin D3 (CHOLECALCIFEROL) 50 mcg (2000 units) tablet Take 3 tablets by mouth daily     No current facility-administered medications for this visit.     ALLERGIES:   Allergies   Allergen Reactions     Propofol Nausea and Vomiting     Shellfish Allergy      Other reaction(s): Dizziness       DIET: Diabetic, regular texture, thin liquids.    Vitals:    03/29/22 1109   BP: 135/75   Pulse: 75   Resp: 18   Temp: 98.1  F (36.7  C)   SpO2: 92%   Weight: 88.4 kg (194 lb 12.8 oz)   Height: 1.651 m (5' 5\")     Body mass index is 32.42 kg/m .    EXAMINATION:   General: Pleasant, very loquacious elderly female, sitting in the chair but would be better served with a recliner.   Head: Normocephalic and atraumatic.   Eyes: PERRLA, sclerae clear.   ENT: Moist oral mucosa.  Upper dentures and some remaining lower teeth.  Cardiovascular: Regular rate and rhythm with a 2/6 systolic ejection murmur with split S2 at the left sternal border.   Respiratory: Lungs clear to auscultation bilaterally.   Abdomen: Nondistended.   Musculoskeletal/Extremities: Age-related degenerative joint disease.  Bilateral trace to 1+ pedal edema.  Integument: Remnants of right antecubital rash.  Cognitive/Psychiatric: Alert and oriented x4.  Affect is euthymic.    DIAGNOSTICS:   No results found for this or any previous visit (from the past 240 hour(s)).     Last Comprehensive Metabolic Panel:  Sodium   Date Value Ref Range Status "   03/07/2022 139 136 - 145 mmol/L Final   10/24/2017 139 133 - 144 mmol/L Final     Potassium   Date Value Ref Range Status   03/07/2022 3.8 3.5 - 5.0 mmol/L Final   10/24/2017 3.9 3.4 - 5.3 mmol/L Final     Chloride   Date Value Ref Range Status   03/07/2022 100 98 - 107 mmol/L Final   10/24/2017 101 94 - 109 mmol/L Final     Carbon Dioxide   Date Value Ref Range Status   10/24/2017 28 20 - 32 mmol/L Final     Carbon Dioxide (CO2)   Date Value Ref Range Status   03/07/2022 25 22 - 31 mmol/L Final     Anion Gap   Date Value Ref Range Status   03/07/2022 14 5 - 18 mmol/L Final   10/24/2017 10 3 - 14 mmol/L Final     Glucose   Date Value Ref Range Status   03/07/2022 282 (H) 70 - 125 mg/dL Final   10/24/2017 116 (H) 70 - 99 mg/dL Final     GLUCOSE BY METER POCT   Date Value Ref Range Status   03/13/2022 176 (H) 70 - 99 mg/dL Final     Urea Nitrogen   Date Value Ref Range Status   03/07/2022 23 8 - 28 mg/dL Final   10/24/2017 18 7 - 30 mg/dL Final     Creatinine   Date Value Ref Range Status   03/07/2022 1.07 0.60 - 1.10 mg/dL Final   10/24/2017 0.84 0.52 - 1.04 mg/dL Final     GFR Estimate   Date Value Ref Range Status   03/07/2022 53 (L) >60 mL/min/1.73m2 Final     Comment:     Effective December 21, 2021 eGFRcr in adults is calculated using the 2021 CKD-EPI creatinine equation which includes age and gender (Teena et al., NEJM, DOI: 10.1056/VBROvo9829307)   12/29/2020 >60 >60 mL/min/1.73m2 Final   10/24/2017 66 >60 mL/min/1.7m2 Final     Comment:     Non  GFR Calc     Calcium   Date Value Ref Range Status   03/07/2022 9.0 8.5 - 10.5 mg/dL Final   10/24/2017 9.5 8.5 - 10.1 mg/dL Final     Bilirubin Total   Date Value Ref Range Status   07/19/2021 0.7 0.0 - 1.0 mg/dL Final   02/14/2017 0.4 0.2 - 1.3 mg/dL Final     Alkaline Phosphatase   Date Value Ref Range Status   07/19/2021 159 (H) 45 - 120 U/L Final   02/14/2017 85 40 - 150 U/L Final     ALT   Date Value Ref Range Status   07/19/2021 52 (H) 0 - 45  U/L Final   02/14/2017 26 0 - 50 U/L Final     AST   Date Value Ref Range Status   07/19/2021 28 0 - 40 U/L Final   02/14/2017 25 0 - 45 U/L Final       Lab Results   Component Value Date    WBC 8.9 03/11/2022    WBC 8.9 04/12/2016     Lab Results   Component Value Date    RBC 3.86 03/11/2022    RBC 3.99 04/12/2016     Lab Results   Component Value Date    HGB 8.7 03/11/2022    HGB 12.2 10/24/2017     Lab Results   Component Value Date    HCT 32.4 03/11/2022    HCT 36.0 04/12/2016     Lab Results   Component Value Date    MCV 84 03/11/2022    MCV 90 04/12/2016     Lab Results   Component Value Date    MCH 22.5 03/11/2022    MCH 26.1 04/12/2016     Lab Results   Component Value Date    MCHC 26.9 03/11/2022    MCHC 28.9 04/12/2016     Lab Results   Component Value Date    RDW 16.8 03/11/2022    RDW 15.8 04/12/2016     Lab Results   Component Value Date     03/11/2022     04/12/2016       ASSESSMENT/Plan:      ICD-10-CM    1. Pneumonia of right lower lobe due to infectious organism  J18.9    2. Severe sepsis (H)  A41.9     R65.20    3. Neural foraminal stenosis of cervical spine, bilateral  M48.02    4. Paravertebral mass  R22.2    5. Multilevel spondylosis  M47.819    6. Type 2 diabetes mellitus with hyperglycemia, unspecified whether long term insulin use (H)  E11.65    7. Spasm of back muscles  M62.830    8. Bilateral hearing loss, unspecified hearing loss type  H91.93    9. Bipolar 1 disorder, depressed, full remission (H)  F31.76    10. Meniere's disease (cochlear hydrops) of both ears - on prednisone and triamterene hydrocholorothiazide  H81.03    11. Anxiety and depression  F41.9     F32.A    12. Essential hypertension  I10    13. Chronic bilateral back pain, unspecified back location  M54.9     G89.29    14. Mixed hyperlipidemia  E78.2    15. Slow transit constipation  K59.01         CHANGES:    Meloxicam 7.5 mg tab daily.    CARE PLAN:    The care plan, medications, vital signs, orders, and  nursing notes have been reviewed, and all orders signed. Changes to care plan, if any, as noted. Otherwise, continue current plan of care.      The above has been created using voice recognition software. Please be aware that this may unintentionally  produce inaccuracies and/or nonsensical sentences.    I was present with the medical student/advanced practice registered nurse, Marce Elliott RN, who participated in the service and in the documentation of this note.  I have verified the history and personally performed the physical exam and medical decision making.  I agree with the assessment and plan of care as documented in the note.      Electronically signed by: JUAN ANTONIO Ching CNP        Sincerely,        JUAN ANTONIO Ching CNP

## 2022-03-30 ENCOUNTER — TELEPHONE (OUTPATIENT)
Dept: GERIATRICS | Facility: CLINIC | Age: 82
End: 2022-03-30
Payer: MEDICARE

## 2022-03-30 RX ORDER — METHOCARBAMOL 500 MG/1
500 TABLET, FILM COATED ORAL 4 TIMES DAILY
Status: ON HOLD | COMMUNITY
End: 2022-04-12

## 2022-03-30 NOTE — TELEPHONE ENCOUNTER
[facility] Triage Nurse Telephone Encounter    Provider: MADDISON Walsh  Facility: [facility] Facility Type:  TCU    Caller: Radha  Call Back Number: [phone]    Allergies:    Allergies   Allergen Reactions     Propofol Nausea and Vomiting     Shellfish Allergy      Other reaction(s): Dizziness        Reason for call: Pt was questioning that her methocarbamol dose should be decreased or not.     Verbal Order/Direction given by Provider: Pt should be on Methocarbamol 500mg qid.     Provider giving Order:  MADDISON Walsh    Verbal Order given to: Radha Alvarenga RN

## 2022-04-01 ENCOUNTER — TRANSITIONAL CARE UNIT VISIT (OUTPATIENT)
Dept: GERIATRICS | Facility: CLINIC | Age: 82
End: 2022-04-01
Payer: MEDICARE

## 2022-04-01 VITALS
OXYGEN SATURATION: 96 % | BODY MASS INDEX: 32.46 KG/M2 | HEIGHT: 65 IN | WEIGHT: 194.8 LBS | RESPIRATION RATE: 18 BRPM | SYSTOLIC BLOOD PRESSURE: 169 MMHG | DIASTOLIC BLOOD PRESSURE: 85 MMHG | HEART RATE: 85 BPM | TEMPERATURE: 97.4 F

## 2022-04-01 DIAGNOSIS — Z87.01 HISTORY OF COMMUNITY ACQUIRED PNEUMONIA: ICD-10-CM

## 2022-04-01 DIAGNOSIS — R22.2 PARAVERTEBRAL MASS: ICD-10-CM

## 2022-04-01 DIAGNOSIS — N18.31 STAGE 3A CHRONIC KIDNEY DISEASE (H): ICD-10-CM

## 2022-04-01 DIAGNOSIS — H91.93 BILATERAL HEARING LOSS, UNSPECIFIED HEARING LOSS TYPE: ICD-10-CM

## 2022-04-01 DIAGNOSIS — I10 ESSENTIAL HYPERTENSION: ICD-10-CM

## 2022-04-01 DIAGNOSIS — M48.02 NEURAL FORAMINAL STENOSIS OF CERVICAL SPINE: ICD-10-CM

## 2022-04-01 DIAGNOSIS — F31.76 BIPOLAR 1 DISORDER, DEPRESSED, FULL REMISSION (H): ICD-10-CM

## 2022-04-01 DIAGNOSIS — K59.01 SLOW TRANSIT CONSTIPATION: ICD-10-CM

## 2022-04-01 DIAGNOSIS — E78.2 MIXED HYPERLIPIDEMIA: ICD-10-CM

## 2022-04-01 DIAGNOSIS — J18.9 PNEUMONIA OF RIGHT LOWER LOBE DUE TO INFECTIOUS ORGANISM: Primary | ICD-10-CM

## 2022-04-01 DIAGNOSIS — M47.819 MULTILEVEL SPONDYLOSIS: ICD-10-CM

## 2022-04-01 DIAGNOSIS — H81.03 COCHLEAR HYDROPS OF BOTH EARS: ICD-10-CM

## 2022-04-01 DIAGNOSIS — E11.65 TYPE 2 DIABETES MELLITUS WITH HYPERGLYCEMIA, UNSPECIFIED WHETHER LONG TERM INSULIN USE (H): ICD-10-CM

## 2022-04-01 DIAGNOSIS — M62.830 SPASM OF BACK MUSCLES: ICD-10-CM

## 2022-04-01 DIAGNOSIS — G89.29 CHRONIC BILATERAL BACK PAIN, UNSPECIFIED BACK LOCATION: ICD-10-CM

## 2022-04-01 DIAGNOSIS — Z86.19 HISTORY OF SEPSIS: ICD-10-CM

## 2022-04-01 DIAGNOSIS — F41.9 ANXIETY AND DEPRESSION: ICD-10-CM

## 2022-04-01 DIAGNOSIS — F32.A ANXIETY AND DEPRESSION: ICD-10-CM

## 2022-04-01 DIAGNOSIS — M54.9 CHRONIC BILATERAL BACK PAIN, UNSPECIFIED BACK LOCATION: ICD-10-CM

## 2022-04-01 PROCEDURE — 99310 SBSQ NF CARE HIGH MDM 45: CPT | Performed by: NURSE PRACTITIONER

## 2022-04-01 NOTE — LETTER
2022        RE: Tonya Yang  [address]  [place] [address]        [facility]    Name:   Tonya Yang  :   1940  MRN:    [id number]     Facility:   [facility]) [[id number]]   Room: [place] [address]  Code Status: DNR and POLST AVAILABLE - Had been full code in the hospital    DOS:  2022  Previous visit: 2022    PCP:  Ananya Mclean NP     CHIEF COMPLAINT / REASON FOR VISIT:  Chief Complaint   Patient presents with     Clinic Care Coordination - Follow-up     CAP, severe sepsis, acute on chronic back pain      [facility] from 2022 until 2022 (pneumonia, severe sepsis, acute on chronic back pain)      HPI: Tonya is a 81 year old female with a past medical history significant for severe spinal stenosis (multilevel), polyarthralgia, Ménière's disease/cochlear hydrops, diabetes mellitus type 2 (with nephropathy), who presented with acute on chronic back pain that had become worse over the preceding months, and she could no longer ambulate with her cane and became essentially bedbound.  Further work-up in the ER was concerning for lactic acidosis, leukocytosis, and CXR concerning for medial right lower lobe pneumonia.  Treated with a fluid bolus, pain medications, and Zosyn and vancomycin, ultimately admitted for further work-up.    She was found to have leukocytosis and lactic acidosis, and a CXR was concerning for right-sided pneumonia.  She had a cough for about a week with symptoms of regurgitation.  A blood culture was negative.  Diagnosed with CAP and severe sepsis, she was treated with ceftriaxone and azithromycin with a transition to oral Augmentin.    She has had chronic back pain for some time.  Further work-up in the ER was concerning for severe multilevel spinal stenosis with bilateral neuroforaminal stenosis and compression of bilateral exiting cauda equina nerve roots, left greater  than right.  Spine surgery was consulted with ensuing discussion with the patient and her son, and it was recommended that she recover from pneumonia and then reassess the need for surgical intervention.  Later during her stay, she underwent an epidural steroid injection (03/11) with good effect, and it made it possible for her to participate with therapy and ambulate easier.    Incidental finding on MRI of the thoracic spine showed a small, well-circumscribed enhancing lesion along the left anterior lateral paravertebral soft tissue, measuring 1.2 x 1.1 x 1.4 cm.  Nonspecific finding could represent neurofibroma/schwannoma, nerve sheath tumor, metastatic lymph node, or atypical metastatic mass.  Radiologist recommended CT chest for further assessment.  This was ordered by neurosurgery, and it showed a mass in the last anterior paravertebral fat posterior to the descending thoracic aorta, most likely representing lymph node with recommendation for 6 months follow-up CT.    She also has essential hypertension that has required multiple medications, including triamterene hydrochlorothiazide (which she takes for Ménière's disease) and increasing doses of amlodipine.  She was started on carvedilol with good control and was kept on this at discharge.    As noted, she also has Ménière's disease for which, in addition to the triamterene hydrochlorothiazide, she also takes prednisone.  She stated that the condition is not severe and is only problematic if she gets up too fast.    She has a mood disorder for which she receives duloxetine, Lamictal, and as needed lorazepam for anxiety.    Overactive bladder places her on oxybutynin.  Occasional urge incontinence.    Diagnosis of GERD, and she did have symptoms of food stuck in her esophagus.  A swallow study was not concerning for aspiration.  Consider follow-up with GI if symptoms persist or recur and you may want to consider esophagogram to evaluate for motility issues.    "  She developed a right antecubital skin rash, likely secondary to tape.  Orders were given for 0.1% triamcinolone cream.    She has diabetes mellitus type 2 with hyperglycemia.  Her last A1c was 8.8.  She was on insulin while inpatient, and hyperglycemia was felt secondary to a steroid injection, and she continued to improve.  She is adamant against giving herself insulin.  She was ultimately ready for discharge to the TCU.    Suspected chronic kidney disease.  While she was not prescribed an ACE/ARB in the hospital, CKD was listed as the reason.        CURRENT/RECENT TCU ISSUES    Disposition: She is seen today in her room.  We communicate with a combination of lipreading from a distance as well as interpretation by her son who is in attendance.  This was a very long meeting, beginning at 10 AM and ending at 11:18 AM.  We went over most of her diagnoses and medications, leading to several changes as noted below.    She complains she is still in a lot of pain in the neck and left arm.  She is a clock watcher.  Nursing staff say she requests Percocet almost exactly every four hours (when it is due).  Prior to my last visit, the nurse stated \"that's all she thinks about is, when her next pain pill is due.\" She says her muscle relaxant makes her sleepy, and last visit it was decreased after increasing the dose to 750 mg, we decreased it back to the 500 mg dose, at which point, she stated that it helped with her drowsiness. Therapy is present in the beginning of the visit and he states that she is now dragging her right foot while ambulating and was not doing this when first admitted. The patient believes it is her left leg that is weaker, she states she feels it is going to \"buckle\".     Tonya was discharged in the hospital discharge summary as full code; however, at this juncture she would prefer to be DNR.  She appears to play the role of drama queen during our initial visit and it does a good deal of coughing " "(although her lungs are clear).  She is extremely loquacious, and it is often difficult to get a word in [place] wise.    Pain management: She continues to endorse pain in the neck and both arms, but especially the right shoulder with \"tightening\" that goes across her chest.. She also endorses pain at the waist but says her pain is relieved while sitting or lying down. She has orders for muscle relaxers (methocarbamol 500 mg 4 times daily, scheduled oxycodone 5 mg every 6 hours and Percocet (5/325 mg every 4 hours as needed).  She also has a lidocaine patch worn her back just below the neck.  She states her pain can \"make her lose her mind\". She stated she saw [facility] pain clinic in [place] and they explored instilling a pump, however, she was not amenable to this since she is more susceptible to infection due to her chronic prednisone use.  She had also received an ineffective injection of Toradol into her left arm.  An epidural was also ineffective.  I suggested an alternative, meloxicam which we initiated at 7.5 mg daily.  With the patient complaining that it is ineffective, we are going to try increasing the meloxicam to 15 mg daily.    Anxiety: She is currently experiencing a good deal of anxiety.  She has orders for as needed diazepam (0.5 mg twice daily) and she might very well benefit from scheduling every 12 hours.  She does find this effective, although she told me she really wasn't taking it at home.    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  Still, she does manage to hear well enough to carry on a conversation, occasionally needing to resort to writing things down.    Essential hypertension/chronic kidney disease: Diagnosed with CKD stage IIIa.  Nonetheless, we'll be prescribing lisinopril 5 mg daily.  We are going to follow-up with a BMP on 04/07/2022.    Constipation: She earlier endorsed constipation, describing \"meatballs.\"  Now, she has loose stools.  When seen by Dr. Silverman on " 03/18, they did discuss the possibility of giving Tonya a probiotic and, if the stools became worse and more watery, her stool would be tested for C. Difficile. She states she has a history of IBS which has mostly been diarrhea, however, she now states she has constipation.     Diabetes mellitus type 2: She is getting a steroids for her rash and states that it is raising her blood glucose levels; however, she has been on oral medications at home, recently presenting with an A1c of 8.8 as noted.  She also had some glycosuria in the hospital.  She adamantly refuses to accept any idea of giving herself insulin at home.  We are going to increasing metformin from 250 mg twice daily to 500 mg twice daily.  Previously, and the dose higher than this would cause diarrhea.    Urinary incontinence:  Already receiving oxybutynin XL 10 mg daily, we are going to try increasing the dose to 15 mg daily, although I suspect this will also prove ineffective.  She also has frequent urinary tract infections.    Lower extremity edema: I spoke with her son at length about compression stockings and showed him where he could get them at a discount and what size/style to purchase.    Contacts: Davie, her son ([phone]). We had a thorough discussion today regarding pain management for her mother, it was recommended that she seek consult from her previous pain clinic ([facility]), or a new one.    ROS:    Positives: As described above.  Also, rash under breasts and to groin.  Negatives: No headaches or chest pains, coughing or congestion, nausea or vomiting, dyspnea, dysuria, difficulty chewing or swallowing, or any problems with appetite or sleep.      Past Medical History:   Diagnosis Date     Abdominal pain      Anxiety      Arthritis      Asthma      Bipolar 1 disorder (H)      Chronic pain      Cochlear hydrops 1988    steriods and diazide     COPD (chronic obstructive pulmonary disease) (H)      Depression      Diabetes mellitus (H)       Dyspepsia      GERD (gastroesophageal reflux disease)      Hard of hearing     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     Hepatic abscess      Hyperlipidemia      Hypertension      Irritable bowel syndrome      Meniere disease      Neuropathy      Noninfectious ileitis      Peritoneal abscess (H)      Spinal stenosis of lumbar region      Steroid long-term use      Vaginitis, atrophic, postmenopausal      Vitamin D deficiency               Family History   Problem Relation Age of Onset     Cerebrovascular Disease Mother      Heart Disease Mother      Heart Disease Father      Heart Disease Brother      Diabetes No family hx of      Coronary Artery Disease No family hx of      Hypertension No family hx of      Hyperlipidemia No family hx of      Breast Cancer No family hx of      Colon Cancer No family hx of      Prostate Cancer No family hx of      Other Cancer No family hx of      Depression No family hx of      Anxiety Disorder No family hx of      Mental Illness No family hx of      Substance Abuse No family hx of      Anesthesia Reaction No family hx of      Asthma No family hx of      Osteoporosis No family hx of      Genetic Disorder No family hx of      Thyroid Disease No family hx of      Obesity No family hx of      Unknown/Adopted No family hx of      Social History     Socioeconomic History     Marital status:      Spouse name: None     Number of children: None     Years of education: None     Highest education level: None   Occupational History     None   Tobacco Use     Smoking status: Former Smoker     Types: Cigarettes     Quit date: 11/15/1987     Years since quittin.3     Smokeless tobacco: Former User   Substance and Sexual Activity     Alcohol use: Not Currently     Alcohol/week: 0.0 standard drinks     Comment: MALISSA -paco since      Drug use: No     Comment: used marijuanna in the past     Sexual activity: Never     Partners: Male   Other Topics Concern     Parent/sibling  w/ CABG, MI or angioplasty before 65F 55M? No   Social History Narrative     None     Social Determinants of Health     Financial Resource Strain: Not on file   Food Insecurity: Not on file   Transportation Needs: Not on file   Physical Activity: Not on file   Stress: Not on file   Social Connections: Not on file   Intimate Partner Violence: Not on file   Housing Stability: Not on file       MEDICATIONS: Reviewed from the MAR, physician orders, and/or earlier progress notes.  Post Discharge Medication Reconciliation Status: medication reconcilation previously completed during another office visit.    Current Outpatient Medications   Medication Sig     lisinopril (ZESTRIL) 5 MG tablet Take 5 mg by mouth daily     meloxicam (MOBIC) 15 MG tablet Take 15 mg by mouth daily     acetaminophen (TYLENOL) 325 MG tablet Take 1-2 tablets (325-650 mg) by mouth every 6 hours as needed for mild pain     amLODIPine (NORVASC) 10 MG tablet Take 1 tablet (10 mg) by mouth daily     atorvastatin (LIPITOR) 10 MG tablet Take 10 mg by mouth At Bedtime     blood glucose (ACCU-CHEK FASTCLIX) lancing device FOR TESTING ONCE DAILY. DX  E11.9 TYPE 2 DIABETES     blood glucose (CONTOUR TEST) test strip TESTING EVERY DAY DX  E11.9     carvedilol (COREG) 3.125 MG tablet Take 1 tablet (3.125 mg) by mouth 2 times daily (with meals)     CONTOUR NEXT TEST test strip TESTING EVERY DAY DX  E11.9     cyanocobalamin 1000 MCG SUBL Place 1,000 mcg under the tongue daily     diazepam (VALIUM) 1 MG/ML solution Take 0.5 mLs (0.5 mg) by mouth 2 times daily as needed for agitation, anxiety or muscle spasms (Patient taking differently: Take 0.5 mg by mouth every 12 hours )     DULoxetine (CYMBALTA) 60 MG EC capsule TAKE 1 CAPSULE (60 MG) BY MOUTH DAILY     fluconazole (DIFLUCAN) 150 MG tablet Take 150 mg by mouth See Admin Instructions One dose prn yeast infection      gabapentin (NEURONTIN) 100 MG capsule Take 2 capsules (200 mg) by mouth 2 times daily      glipiZIDE (GLUCOTROL) 10 MG tablet Take 1 tablet (10 mg) by mouth 2 times daily (before meals)     guaiFENesin-dextromethorphan (ROBITUSSIN DM) 100-10 MG/5ML syrup Take 10 mLs by mouth every 4 hours as needed for cough or congestion (productive cough)     JARDIANCE 25 MG TABS tablet Take 25 mg by mouth daily      lamoTRIgine (LAMICTAL) 100 MG tablet Take 100 mg by mouth 2 times daily     lidocaine (LMX4) 4 % external cream Apply topically 4 times daily as needed for mild pain or moderate pain Apply to painful areas, avoid red or open skin, avoid heat over ointment     metFORMIN (GLUCOPHAGE) 500 MG tablet Take 0.5 tablets (250 mg) by mouth 2 times daily (with meals) (Patient taking differently: Take 500 mg by mouth 2 times daily (with meals) )     methocarbamol (ROBAXIN) 500 MG tablet Take 500 mg by mouth 4 times daily     metoclopramide (REGLAN) 10 MG tablet Take 10 mg by mouth 3 times daily as needed Before meals as needed.     nystatin (MYCOSTATIN) 837328 UNIT/GM external powder Apply topically 2 times daily as needed Breast rash     omeprazole (PRILOSEC) 20 MG DR capsule Take 20 mg by mouth daily      order for DME Equipment being ordered: wrist brace     oxybutynin ER (DITROPAN-XL) 10 MG 24 hr tablet Take 15 mg by mouth At Bedtime      oxyCODONE-acetaminophen (PERCOCET) 5-325 MG tablet Take 1 tablet by mouth 3 times daily. May also take 1 tablet every 4 hours as needed for severe pain. Scheduled times are 6 am, 2 pm, and 10 pm     predniSONE (DELTASONE) 1 MG tablet Take 6 mg by mouth daily 1mg tab and 5mg tab     triamcinolone (KENALOG) 0.1 % external cream Apply topically 2 times daily as needed      triamterene-HCTZ (MAXZIDE-25) 37.5-25 MG tablet Take 1 tablet by mouth daily     vitamin D3 (CHOLECALCIFEROL) 50 mcg (2000 units) tablet Take 3 tablets by mouth daily     No current facility-administered medications for this visit.     ALLERGIES:   Allergies   Allergen Reactions     Propofol Nausea and Vomiting      "Shellfish Allergy      Other reaction(s): Dizziness       DIET: Diabetic, regular texture, thin liquids.    Vitals:    04/01/22 1253   BP: (!) 169/85   Pulse: 85   Resp: 18   Temp: 97.4  F (36.3  C)   SpO2: 96%   Weight: 88.4 kg (194 lb 12.8 oz)   Height: 1.651 m (5' 5\")     Body mass index is 32.42 kg/m .    EXAMINATION:   General: Pleasant, very loquacious elderly female, sitting in the chair but would be better served with a recliner.   Head: Normocephalic and atraumatic.   Eyes: PERRLA, sclerae clear.   ENT: Moist oral mucosa.  Upper dentures and some remaining lower teeth.  Cardiovascular: Regular rate and rhythm with a 2/6 systolic ejection murmur with split S2 at the left sternal border.   Respiratory: Lungs clear to auscultation bilaterally.   Abdomen: Nondistended.   Musculoskeletal/Extremities: Age-related degenerative joint disease.  Bilateral trace to 1+ pedal edema.  Integument: Remnants of right antecubital rash.  Cognitive/Psychiatric: Alert and oriented x4.  Affect is euthymic.    DIAGNOSTICS:   No results found for this or any previous visit (from the past 240 hour(s)).     Last Comprehensive Metabolic Panel:  Sodium   Date Value Ref Range Status   03/07/2022 139 136 - 145 mmol/L Final   10/24/2017 139 133 - 144 mmol/L Final     Potassium   Date Value Ref Range Status   03/07/2022 3.8 3.5 - 5.0 mmol/L Final   10/24/2017 3.9 3.4 - 5.3 mmol/L Final     Chloride   Date Value Ref Range Status   03/07/2022 100 98 - 107 mmol/L Final   10/24/2017 101 94 - 109 mmol/L Final     Carbon Dioxide   Date Value Ref Range Status   10/24/2017 28 20 - 32 mmol/L Final     Carbon Dioxide (CO2)   Date Value Ref Range Status   03/07/2022 25 22 - 31 mmol/L Final     Anion Gap   Date Value Ref Range Status   03/07/2022 14 5 - 18 mmol/L Final   10/24/2017 10 3 - 14 mmol/L Final     Glucose   Date Value Ref Range Status   03/07/2022 282 (H) 70 - 125 mg/dL Final   10/24/2017 116 (H) 70 - 99 mg/dL Final     GLUCOSE BY METER " POCT   Date Value Ref Range Status   03/13/2022 176 (H) 70 - 99 mg/dL Final     Urea Nitrogen   Date Value Ref Range Status   03/07/2022 23 8 - 28 mg/dL Final   10/24/2017 18 7 - 30 mg/dL Final     Creatinine   Date Value Ref Range Status   03/07/2022 1.07 0.60 - 1.10 mg/dL Final   10/24/2017 0.84 0.52 - 1.04 mg/dL Final     GFR Estimate   Date Value Ref Range Status   03/07/2022 53 (L) >60 mL/min/1.73m2 Final     Comment:     Effective December 21, 2021 eGFRcr in adults is calculated using the 2021 CKD-EPI creatinine equation which includes age and gender (Teena et al., NEJ, DOI: 10.1056/YPBOqv6392984)   12/29/2020 >60 >60 mL/min/1.73m2 Final   10/24/2017 66 >60 mL/min/1.7m2 Final     Comment:     Non  GFR Calc     Calcium   Date Value Ref Range Status   03/07/2022 9.0 8.5 - 10.5 mg/dL Final   10/24/2017 9.5 8.5 - 10.1 mg/dL Final     Bilirubin Total   Date Value Ref Range Status   07/19/2021 0.7 0.0 - 1.0 mg/dL Final   02/14/2017 0.4 0.2 - 1.3 mg/dL Final     Alkaline Phosphatase   Date Value Ref Range Status   07/19/2021 159 (H) 45 - 120 U/L Final   02/14/2017 85 40 - 150 U/L Final     ALT   Date Value Ref Range Status   07/19/2021 52 (H) 0 - 45 U/L Final   02/14/2017 26 0 - 50 U/L Final     AST   Date Value Ref Range Status   07/19/2021 28 0 - 40 U/L Final   02/14/2017 25 0 - 45 U/L Final       Lab Results   Component Value Date    WBC 8.9 03/11/2022    WBC 8.9 04/12/2016     Lab Results   Component Value Date    RBC 3.86 03/11/2022    RBC 3.99 04/12/2016     Lab Results   Component Value Date    HGB 8.7 03/11/2022    HGB 12.2 10/24/2017     Lab Results   Component Value Date    HCT 32.4 03/11/2022    HCT 36.0 04/12/2016     Lab Results   Component Value Date    MCV 84 03/11/2022    MCV 90 04/12/2016     Lab Results   Component Value Date    MCH 22.5 03/11/2022    MCH 26.1 04/12/2016     Lab Results   Component Value Date    MCHC 26.9 03/11/2022    MCHC 28.9 04/12/2016     Lab Results    Component Value Date    RDW 16.8 03/11/2022    RDW 15.8 04/12/2016     Lab Results   Component Value Date     03/11/2022     04/12/2016       ASSESSMENT/Plan:      ICD-10-CM    1. Pneumonia of right lower lobe due to infectious organism  J18.9    2. History of community acquired pneumonia, right lower lobe  Z87.01    3. History of severe sepsis  Z86.19    4. Neural foraminal stenosis of cervical spine, bilateral  M48.02    5. Paravertebral mass  R22.2    6. Multilevel spondylosis  M47.819    7. Type 2 diabetes mellitus with hyperglycemia, unspecified whether long term insulin use (H)  E11.65    8. Stage 3a chronic kidney disease (H)  N18.31    9. Spasm of back muscles  M62.830    10. Bilateral hearing loss, unspecified hearing loss type  H91.93    11. Bipolar 1 disorder, depressed, full remission (H)  F31.76    12. 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 Anxiety and depression  F41.9     F32.A    14. Essential hypertension  I10    15. Chronic bilateral back pain, unspecified back location  M54.9     G89.29    16. Mixed hyperlipidemia  E78.2    17. Slow transit constipation  K59.01        CHANGES:    1.  Increase meloxicam from 7.5 mg tab daily to 15 mg daily.  2.  Increase Metformin from 250 mg twice daily to 500 mg twice daily.  3.  Increase oxybutynin XL from 10 mg daily to 15 mg daily.  4.  Add lisinopril 5 mg daily (in response to Best Practice Advisory.  5.  BMP on 04/07/2022.  6.  Okay for compression stockings that son will supply.  On in the morning and off at bedtime.    CARE PLAN:    The care plan, medications, vital signs, orders, and nursing notes have been reviewed, and all orders signed. Changes to care plan, if any, as noted. Otherwise, continue current plan of care.  Total time spent in this encounter was 78 minutes, with greater than 50% spent in counseling and coordination of care that included a review of  most of her diagnoses and medications, with several changes being made.    The above has been created using voice recognition software. Please be aware that this may unintentionally  produce inaccuracies and/or nonsensical sentences.      Electronically signed by: JUAN ANTONIO Ching CNP        Sincerely,        JUAN ANTONIO Ching CNP

## 2022-04-03 RX ORDER — MELOXICAM 7.5 MG/1
15 TABLET ORAL DAILY
Status: ON HOLD | COMMUNITY
Start: 2022-04-01 | End: 2022-04-15

## 2022-04-03 RX ORDER — LISINOPRIL 5 MG/1
5 TABLET ORAL DAILY
COMMUNITY
Start: 2022-04-01 | End: 2022-05-26

## 2022-04-04 ENCOUNTER — TRANSITIONAL CARE UNIT VISIT (OUTPATIENT)
Dept: GERIATRICS | Facility: CLINIC | Age: 82
End: 2022-04-04
Payer: MEDICARE

## 2022-04-04 VITALS
HEIGHT: 65 IN | BODY MASS INDEX: 32.46 KG/M2 | RESPIRATION RATE: 17 BRPM | TEMPERATURE: 98.9 F | HEART RATE: 87 BPM | WEIGHT: 194.8 LBS | OXYGEN SATURATION: 93 % | SYSTOLIC BLOOD PRESSURE: 128 MMHG | DIASTOLIC BLOOD PRESSURE: 66 MMHG

## 2022-04-04 DIAGNOSIS — M48.02 NEURAL FORAMINAL STENOSIS OF CERVICAL SPINE: ICD-10-CM

## 2022-04-04 DIAGNOSIS — Z87.01 HISTORY OF COMMUNITY ACQUIRED PNEUMONIA: ICD-10-CM

## 2022-04-04 DIAGNOSIS — F31.76 BIPOLAR 1 DISORDER, DEPRESSED, FULL REMISSION (H): ICD-10-CM

## 2022-04-04 DIAGNOSIS — R22.2 PARAVERTEBRAL MASS: ICD-10-CM

## 2022-04-04 DIAGNOSIS — Z86.19 HISTORY OF SEPSIS: ICD-10-CM

## 2022-04-04 DIAGNOSIS — E78.2 MIXED HYPERLIPIDEMIA: ICD-10-CM

## 2022-04-04 DIAGNOSIS — G89.29 CHRONIC BILATERAL BACK PAIN, UNSPECIFIED BACK LOCATION: ICD-10-CM

## 2022-04-04 DIAGNOSIS — F32.A ANXIETY AND DEPRESSION: ICD-10-CM

## 2022-04-04 DIAGNOSIS — F41.9 ANXIETY AND DEPRESSION: ICD-10-CM

## 2022-04-04 DIAGNOSIS — E11.65 TYPE 2 DIABETES MELLITUS WITH HYPERGLYCEMIA, UNSPECIFIED WHETHER LONG TERM INSULIN USE (H): ICD-10-CM

## 2022-04-04 DIAGNOSIS — I10 ESSENTIAL HYPERTENSION: ICD-10-CM

## 2022-04-04 DIAGNOSIS — N18.31 STAGE 3A CHRONIC KIDNEY DISEASE (H): ICD-10-CM

## 2022-04-04 DIAGNOSIS — K59.01 SLOW TRANSIT CONSTIPATION: ICD-10-CM

## 2022-04-04 DIAGNOSIS — M62.830 SPASM OF BACK MUSCLES: ICD-10-CM

## 2022-04-04 DIAGNOSIS — M47.819 MULTILEVEL SPONDYLOSIS: ICD-10-CM

## 2022-04-04 DIAGNOSIS — M54.9 CHRONIC BILATERAL BACK PAIN, UNSPECIFIED BACK LOCATION: ICD-10-CM

## 2022-04-04 DIAGNOSIS — H91.93 BILATERAL HEARING LOSS, UNSPECIFIED HEARING LOSS TYPE: ICD-10-CM

## 2022-04-04 DIAGNOSIS — H81.03 COCHLEAR HYDROPS OF BOTH EARS: ICD-10-CM

## 2022-04-04 DIAGNOSIS — N39.46 MIXED STRESS AND URGE URINARY INCONTINENCE: ICD-10-CM

## 2022-04-04 DIAGNOSIS — J18.9 PNEUMONIA OF RIGHT LOWER LOBE DUE TO INFECTIOUS ORGANISM: Primary | ICD-10-CM

## 2022-04-04 PROCEDURE — 99310 SBSQ NF CARE HIGH MDM 45: CPT | Performed by: NURSE PRACTITIONER

## 2022-04-04 RX ORDER — FUROSEMIDE 20 MG
20 TABLET ORAL DAILY
Status: ON HOLD | COMMUNITY
Start: 2022-04-04 | End: 2022-04-12

## 2022-04-04 RX ORDER — OXYCODONE AND ACETAMINOPHEN 5; 325 MG/1; MG/1
1.5 TABLET ORAL EVERY 8 HOURS
Status: ON HOLD | COMMUNITY
Start: 2022-04-04 | End: 2022-04-12

## 2022-04-04 RX ORDER — MIRABEGRON 25 MG/1
25 TABLET, FILM COATED, EXTENDED RELEASE ORAL AT BEDTIME
COMMUNITY
Start: 2022-04-04 | End: 2022-05-26

## 2022-04-04 NOTE — LETTER
2022        RE: Tonya Yang  [address]  [place] [address]        [facility]    Name:   Tonya Yang  :   1940  MRN:    [id number]     Facility:   [facility]) [[id number]]   Room: [place] [address]  Code Status: DNR and POLST AVAILABLE - Had been full code in the hospital    DOS:  2022  Previous visit: 2022    PCP:  Ananya Mclean NP     CHIEF COMPLAINT / REASON FOR VISIT:  Chief Complaint   Patient presents with     Clinic Care Coordination - Follow-up     CAP, severe sepsis, acute on chronic back pain      [facility] from 2022 until 2022 (pneumonia, severe sepsis, acute on chronic back pain)      HPI: Tonya is a 81 year old female with a past medical history significant for severe spinal stenosis (multilevel), polyarthralgia, Ménière's disease/cochlear hydrops, diabetes mellitus type 2 (with nephropathy), who presented with acute on chronic back pain that had become worse over the preceding months, and she could no longer ambulate with her cane and became essentially bedbound.  Further work-up in the ER was concerning for lactic acidosis, leukocytosis, and CXR concerning for medial right lower lobe pneumonia.  Treated with a fluid bolus, pain medications, and Zosyn and vancomycin, ultimately admitted for further work-up.    She was found to have leukocytosis and lactic acidosis, and a CXR was concerning for right-sided pneumonia.  She had a cough for about a week with symptoms of regurgitation.  A blood culture was negative.  Diagnosed with CAP and severe sepsis, she was treated with ceftriaxone and azithromycin with a transition to oral Augmentin.    She has had chronic back pain for some time.  Further work-up in the ER was concerning for severe multilevel spinal stenosis with bilateral neuroforaminal stenosis and compression of bilateral exiting cauda equina nerve roots, left greater  than right.  Spine surgery was consulted with ensuing discussion with the patient and her son, and it was recommended that she recover from pneumonia and then reassess the need for surgical intervention.  Later during her stay, she underwent an epidural steroid injection (03/11) with good effect, and it made it possible for her to participate with therapy and ambulate easier.    Incidental finding on MRI of the thoracic spine showed a small, well-circumscribed enhancing lesion along the left anterior lateral paravertebral soft tissue, measuring 1.2 x 1.1 x 1.4 cm.  Nonspecific finding could represent neurofibroma/schwannoma, nerve sheath tumor, metastatic lymph node, or atypical metastatic mass.  Radiologist recommended CT chest for further assessment.  This was ordered by neurosurgery, and it showed a mass in the last anterior paravertebral fat posterior to the descending thoracic aorta, most likely representing lymph node with recommendation for 6 months follow-up CT.    She also has essential hypertension that has required multiple medications, including triamterene hydrochlorothiazide (which she takes for Ménière's disease) and increasing doses of amlodipine.  She was started on carvedilol with good control and was kept on this at discharge.    As noted, she also has Ménière's disease for which, in addition to the triamterene hydrochlorothiazide, she also takes prednisone.  She stated that the condition is not severe and is only problematic if she gets up too fast.    She has a mood disorder for which she receives duloxetine, Lamictal, and as needed lorazepam for anxiety.    Overactive bladder places her on oxybutynin.  Occasional urge incontinence.    Diagnosis of GERD, and she did have symptoms of food stuck in her esophagus.  A swallow study was not concerning for aspiration.  Consider follow-up with GI if symptoms persist or recur and you may want to consider esophagogram to evaluate for motility issues.    "  She developed a right antecubital skin rash, likely secondary to tape.  Orders were given for 0.1% triamcinolone cream.    She has diabetes mellitus type 2 with hyperglycemia.  Her last A1c was 8.8.  She was on insulin while inpatient, and hyperglycemia was felt secondary to a steroid injection, and she continued to improve.  She is adamant against giving herself insulin.  She was ultimately ready for discharge to the TCU.    Suspected chronic kidney disease.  While she was not prescribed an ACE/ARB in the hospital, CKD was listed as the reason.      Tonya was discharged in the hospital discharge summary as full code; however, at this juncture she would prefer to be DNR.        CURRENT/RECENT TCU ISSUES    Disposition:  We communicate with a combination of lipreading from a distance as well as a loud voice.  This was a fairly long visit, despite family not being there.  We again went over her many problems.  Pain has been her biggest issue, and I have finally consented to going for a dose increase of her Percocet (going from 1 tablet to 1.5 tablets, see orders below).  This is something we will need to monitor closely.  I understand that she had reneged on a controlled substance agreement; however, she describes her son as being at wits end with all her problems.  I offered to do the best I could to remedy them.  According to PT, she couldn't do much in the way of exercises because of her pain.  TODAY, she complains of spasms in her left leg.  She describes a \"sharp spasm\" just under the knee.    Pain management:  Pain is as usual, and the back of the neck, the left arm, the right lateral thigh, and the location of her pain can vary from day-to-day.  Getting in and out of bed is \"the worst.\"  I do believe that she is in a fair amount of pain, although it is intermittent (i.e., when moving about), and I don't suspect this is the answer.  I did suggest she be seen at the pain clinic.  We have tried methocarbamol " "with limited success, and she complained that it made her sleepy.  We have added meloxicam, subsequently increasing the dose, again with insufficient relief.  Today, she actually asked if I could increase the meloxicam further, but I informed her we were at the maximum dose.    Anxiety: She appears to be in an almost perpetual state of anxiety while in the TCU.  Unsure whether this is her baseline or not.  She has orders for as needed diazepam (0.5 mg twice daily) and she might very well benefit from scheduling every 12 hours.  She does find this effective, although she told me she really wasn't taking it at home.    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  Still, she does manage to hear well enough to carry on a conversation, occasionally needing to resort to writing things down.    Essential hypertension/chronic kidney disease: Diagnosed with CKD stage IIIa.  Nonetheless, we prescribed lisinopril 5 mg daily.  Follow-up BMP on 04/07/2022 (already ordered).  Actually, over the last several days, blood pressures look much better, with systolics ranging between 118 and 135, diastolics between 66 and 71.    Constipation: A bit improved.    Diabetes mellitus type 2: Still receiving steroids for a rash, thereby raising her blood glucose levels.  She has been on oral medications at home, though recently presenting with an A1c of 8.8.  She also had some glycosuria in the hospital.  She adamantly refuses to accept any idea of giving herself insulin at home.  We increased metformin from 250 mg twice daily to 500 mg twice daily.  Previously, the dose higher than this would cause diarrhea.  Continue monitoring.    Urinary incontinence:  Already receiving oxybutynin XL 10 mg daily, we tried increasing the dose to 15 mg daily, and as one might suspect, it was still ineffective.  I told her we were going to discontinue it and try something new.  Hopefully, Myrbetriq 24-hour 25 mg daily will be covered " "by her insurance.  There is another option to add solifenacin (5 to 10 mg/day).  She also has frequent urinary tract infections.    Lower extremity edema: I spoke with her son at length about compression stockings and showed him where he could get them at a discount and what size/style to purchase.  She is currently wearing ones without sufficient compression, but they are better than nothing.  In addition to the edema, however, I am informed that she has gained 15 pounds since admission here.  Suspecting she may end up in fluid overload (despite not having a diagnosis of CHF), we will add 20 mg of furosemide daily.  As noted, a metabolic profile has already been requested.    Contacts: Davie, her son ([phone]). We had a thorough discussion today regarding pain management for her mother, it was recommended that she seek consult from her previous pain clinic ([facility]), or a new one.    ROS:    Positives: As described above.  Also complaining of dry mouth (always is sucking on ice chips) and dry eyes (has some eyedrops she doesn't believe are working).  It sounds like Sjogren's syndrome; however, she immediately says, \"no more pills.\"  Negatives: No headaches or chest pains, coughing or congestion, nausea or vomiting, dyspnea, dysuria, difficulty chewing or swallowing, or any problems with appetite or sleep.      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     Hepatic abscess      Hyperlipidemia      Hypertension      Irritable bowel syndrome      Meniere disease      Neuropathy      Noninfectious ileitis      Peritoneal abscess (H)      Spinal " stenosis of lumbar region      Steroid long-term use      Vaginitis, atrophic, postmenopausal      Vitamin D deficiency               Family History   Problem Relation Age of Onset     Cerebrovascular Disease Mother      Heart Disease Mother      Heart Disease Father      Heart Disease Brother      Diabetes No family hx of      Coronary Artery Disease No family hx of      Hypertension No family hx of      Hyperlipidemia No family hx of      Breast Cancer No family hx of      Colon Cancer No family hx of      Prostate Cancer No family hx of      Other Cancer No family hx of      Depression No family hx of      Anxiety Disorder No family hx of      Mental Illness No family hx of      Substance Abuse No family hx of      Anesthesia Reaction No family hx of      Asthma No family hx of      Osteoporosis No family hx of      Genetic Disorder No family hx of      Thyroid Disease No family hx of      Obesity No family hx of      Unknown/Adopted No family hx of      Social History     Socioeconomic History     Marital status:      Spouse name: None     Number of children: None     Years of education: None     Highest education level: None   Occupational History     None   Tobacco Use     Smoking status: Former Smoker     Types: Cigarettes     Quit date: 11/15/1987     Years since quittin.3     Smokeless tobacco: Former User   Substance and Sexual Activity     Alcohol use: Not Currently     Alcohol/week: 0.0 standard drinks     Comment: MALISSA white since      Drug use: No     Comment: used marijuanna in the past     Sexual activity: Never     Partners: Male   Other Topics Concern     Parent/sibling w/ CABG, MI or angioplasty before 65F 55M? No   Social History Narrative     None     Social Determinants of Health     Financial Resource Strain: Not on file   Food Insecurity: Not on file   Transportation Needs: Not on file   Physical Activity: Not on file   Stress: Not on file   Social Connections: Not on file    Intimate Partner Violence: Not on file   Housing Stability: Not on file       MEDICATIONS: Reviewed from the MAR, physician orders, and/or earlier progress notes.  Post Discharge Medication Reconciliation Status: medication reconcilation previously completed during another office visit.    Current Outpatient Medications   Medication Sig     acetaminophen (TYLENOL) 325 MG tablet Take 1-2 tablets (325-650 mg) by mouth every 6 hours as needed for mild pain     amLODIPine (NORVASC) 10 MG tablet Take 1 tablet (10 mg) by mouth daily     atorvastatin (LIPITOR) 10 MG tablet Take 10 mg by mouth At Bedtime     blood glucose (ACCU-CHEK FASTCLIX) lancing device FOR TESTING ONCE DAILY. DX  E11.9 TYPE 2 DIABETES     blood glucose (CONTOUR TEST) test strip TESTING EVERY DAY DX  E11.9     carvedilol (COREG) 3.125 MG tablet Take 1 tablet (3.125 mg) by mouth 2 times daily (with meals)     CONTOUR NEXT TEST test strip TESTING EVERY DAY DX  E11.9     cyanocobalamin 1000 MCG SUBL Place 1,000 mcg under the tongue daily     diazepam (VALIUM) 1 MG/ML solution Take 0.5 mLs (0.5 mg) by mouth 2 times daily as needed for agitation, anxiety or muscle spasms (Patient taking differently: Take 0.5 mg by mouth every 12 hours )     DULoxetine (CYMBALTA) 60 MG EC capsule TAKE 1 CAPSULE (60 MG) BY MOUTH DAILY     fluconazole (DIFLUCAN) 150 MG tablet Take 150 mg by mouth See Admin Instructions One dose prn yeast infection      gabapentin (NEURONTIN) 100 MG capsule Take 2 capsules (200 mg) by mouth 2 times daily     glipiZIDE (GLUCOTROL) 10 MG tablet Take 1 tablet (10 mg) by mouth 2 times daily (before meals)     guaiFENesin-dextromethorphan (ROBITUSSIN DM) 100-10 MG/5ML syrup Take 10 mLs by mouth every 4 hours as needed for cough or congestion (productive cough)     JARDIANCE 25 MG TABS tablet Take 25 mg by mouth daily      lamoTRIgine (LAMICTAL) 100 MG tablet Take 100 mg by mouth 2 times daily     lidocaine (LMX4) 4 % external cream Apply topically  "4 times daily as needed for mild pain or moderate pain Apply to painful areas, avoid red or open skin, avoid heat over ointment     lisinopril (ZESTRIL) 5 MG tablet Take 5 mg by mouth daily     meloxicam (MOBIC) 15 MG tablet Take 15 mg by mouth daily     metFORMIN (GLUCOPHAGE) 500 MG tablet Take 0.5 tablets (250 mg) by mouth 2 times daily (with meals) (Patient taking differently: Take 500 mg by mouth 2 times daily (with meals) )     methocarbamol (ROBAXIN) 500 MG tablet Take 500 mg by mouth 4 times daily     metoclopramide (REGLAN) 10 MG tablet Take 10 mg by mouth 3 times daily as needed Before meals as needed.     nystatin (MYCOSTATIN) 327746 UNIT/GM external powder Apply topically 2 times daily as needed Breast rash     omeprazole (PRILOSEC) 20 MG DR capsule Take 20 mg by mouth daily      order for DME Equipment being ordered: wrist brace     predniSONE (DELTASONE) 1 MG tablet Take 6 mg by mouth daily 1mg tab and 5mg tab     triamcinolone (KENALOG) 0.1 % external cream Apply topically 2 times daily as needed      triamterene-HCTZ (MAXZIDE-25) 37.5-25 MG tablet Take 1 tablet by mouth daily     vitamin D3 (CHOLECALCIFEROL) 50 mcg (2000 units) tablet Take 3 tablets by mouth daily     No current facility-administered medications for this visit.     ALLERGIES:   Allergies   Allergen Reactions     Propofol Nausea and Vomiting     Shellfish Allergy      Other reaction(s): Dizziness       DIET: Diabetic, regular texture, thin liquids.    Vitals:    04/04/22 1113   BP: 128/66   Pulse: 87   Resp: 17   Temp: 98.9  F (37.2  C)   SpO2: 93%   Weight: 88.4 kg (194 lb 12.8 oz)   Height: 1.651 m (5' 5\")     Body mass index is 32.42 kg/m .    EXAMINATION:   General: Pleasant, very loquacious elderly female, sitting on her bed, in no apparent distress.   Head: Normocephalic and atraumatic.   Eyes: PERRLA, sclerae clear.   ENT: Moist oral mucosa.  Upper dentures and some remaining lower teeth.  Cardiovascular: Regular rate and rhythm " with a 2/6 systolic ejection murmur with split S2 at the left sternal border.   Respiratory: Lungs clear to auscultation bilaterally.   Abdomen: Nondistended.   Musculoskeletal/Extremities: Age-related degenerative joint disease.  Bilateral trace to 1+ pedal edema.  Integument: Remnants of right antecubital rash.  Cognitive/Psychiatric: Alert and oriented x4.  Affect is euthymic.    DIAGNOSTICS:   No results found for this or any previous visit (from the past 240 hour(s)).     Last Comprehensive Metabolic Panel:  Sodium   Date Value Ref Range Status   03/07/2022 139 136 - 145 mmol/L Final   10/24/2017 139 133 - 144 mmol/L Final     Potassium   Date Value Ref Range Status   03/07/2022 3.8 3.5 - 5.0 mmol/L Final   10/24/2017 3.9 3.4 - 5.3 mmol/L Final     Chloride   Date Value Ref Range Status   03/07/2022 100 98 - 107 mmol/L Final   10/24/2017 101 94 - 109 mmol/L Final     Carbon Dioxide   Date Value Ref Range Status   10/24/2017 28 20 - 32 mmol/L Final     Carbon Dioxide (CO2)   Date Value Ref Range Status   03/07/2022 25 22 - 31 mmol/L Final     Anion Gap   Date Value Ref Range Status   03/07/2022 14 5 - 18 mmol/L Final   10/24/2017 10 3 - 14 mmol/L Final     Glucose   Date Value Ref Range Status   03/07/2022 282 (H) 70 - 125 mg/dL Final   10/24/2017 116 (H) 70 - 99 mg/dL Final     GLUCOSE BY METER POCT   Date Value Ref Range Status   03/13/2022 176 (H) 70 - 99 mg/dL Final     Urea Nitrogen   Date Value Ref Range Status   03/07/2022 23 8 - 28 mg/dL Final   10/24/2017 18 7 - 30 mg/dL Final     Creatinine   Date Value Ref Range Status   03/07/2022 1.07 0.60 - 1.10 mg/dL Final   10/24/2017 0.84 0.52 - 1.04 mg/dL Final     GFR Estimate   Date Value Ref Range Status   03/07/2022 53 (L) >60 mL/min/1.73m2 Final     Comment:     Effective December 21, 2021 eGFRcr in adults is calculated using the 2021 CKD-EPI creatinine equation which includes age and gender ( et al., NEJM, DOI: 10.1056/URENbo5395307)   12/29/2020 >60  >60 mL/min/1.73m2 Final   10/24/2017 66 >60 mL/min/1.7m2 Final     Comment:     Non  GFR Calc     Calcium   Date Value Ref Range Status   03/07/2022 9.0 8.5 - 10.5 mg/dL Final   10/24/2017 9.5 8.5 - 10.1 mg/dL Final     Bilirubin Total   Date Value Ref Range Status   07/19/2021 0.7 0.0 - 1.0 mg/dL Final   02/14/2017 0.4 0.2 - 1.3 mg/dL Final     Alkaline Phosphatase   Date Value Ref Range Status   07/19/2021 159 (H) 45 - 120 U/L Final   02/14/2017 85 40 - 150 U/L Final     ALT   Date Value Ref Range Status   07/19/2021 52 (H) 0 - 45 U/L Final   02/14/2017 26 0 - 50 U/L Final     AST   Date Value Ref Range Status   07/19/2021 28 0 - 40 U/L Final   02/14/2017 25 0 - 45 U/L Final       Lab Results   Component Value Date    WBC 8.9 03/11/2022    WBC 8.9 04/12/2016     Lab Results   Component Value Date    RBC 3.86 03/11/2022    RBC 3.99 04/12/2016     Lab Results   Component Value Date    HGB 8.7 03/11/2022    HGB 12.2 10/24/2017     Lab Results   Component Value Date    HCT 32.4 03/11/2022    HCT 36.0 04/12/2016     Lab Results   Component Value Date    MCV 84 03/11/2022    MCV 90 04/12/2016     Lab Results   Component Value Date    MCH 22.5 03/11/2022    MCH 26.1 04/12/2016     Lab Results   Component Value Date    MCHC 26.9 03/11/2022    MCHC 28.9 04/12/2016     Lab Results   Component Value Date    RDW 16.8 03/11/2022    RDW 15.8 04/12/2016     Lab Results   Component Value Date     03/11/2022     04/12/2016       ASSESSMENT/Plan:      ICD-10-CM    1. Pneumonia of right lower lobe due to infectious organism  J18.9    2. History of community acquired pneumonia, right lower lobe  Z87.01    3. History of severe sepsis  Z86.19    4. Neural foraminal stenosis of cervical spine, bilateral  M48.02    5. Paravertebral mass  R22.2    6. Multilevel spondylosis  M47.819    7. Type 2 diabetes mellitus with hyperglycemia, unspecified whether long term insulin use (H)  E11.65    8. Stage 3a chronic  kidney disease (H)  N18.31    9. Spasm of back muscles  M62.830    10. Bilateral hearing loss, unspecified hearing loss type  H91.93    11. Bipolar 1 disorder, depressed, full remission (H)  F31.76    12. 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 Anxiety and depression  F41.9     F32.A    14. Essential hypertension  I10    15. Chronic bilateral back pain, unspecified back location  M54.9     G89.29    16. Mixed hyperlipidemia  E78.2    17. Slow transit constipation  K59.01        CHANGES:    1.  Increase oxycodone-acetaminophen from 5 mg to 7.5 mg (1.5 tabs), 1-1/2 tabs every 8 hours scheduled +1-1/2 tabs every 6 hours as needed.  2.  Furosemide 20 mg daily for lower extremity edema and weight gain.  3.  Discontinue oxybutynin.  4.  Start Myrbetriq 24-hour 25 mg daily.  5.  Basic metabolic profile on 04/07/2022.    CARE PLAN:    The care plan, medications, vital signs, orders, and nursing notes have been reviewed, and all orders signed. Changes to care plan, if any, as noted. Otherwise, continue current plan of care.  Total time spent in this encounter was 37 minutes, with greater than 50% spent in counseling and coordination of care that again included a review of most significant diagnoses and all of her medications medications, with several changes being made.    The above has been created using voice recognition software. Please be aware that this may unintentionally  produce inaccuracies and/or nonsensical sentences.      Electronically signed by: JUAN ANTONIO Ching CNP        Sincerely,        JUAN ANTONIO Ching CNP

## 2022-04-04 NOTE — PROGRESS NOTES
[facility]    Name:   Tonya Yang  :   1940  MRN:    [id number]     Facility:   [facility] ([facility]) [[id number]]   Room: [facility] / [address]  Code Status: DNR and POLST AVAILABLE - Had been full code in the hospital    DOS:  2022  Previous visit: 2022    PCP:  Ananya Mclean NP     CHIEF COMPLAINT / REASON FOR VISIT:  Chief Complaint   Patient presents with     Clinic Care Coordination - Follow-up     CAP, severe sepsis, acute on chronic back pain      [facility] from 2022 until 2022 (pneumonia, severe sepsis, acute on chronic back pain)      HPI: Tonya is a 81 year old female with a past medical history significant for severe spinal stenosis (multilevel), polyarthralgia, Ménière's disease/cochlear hydrops, diabetes mellitus type 2 (with nephropathy), who presented with acute on chronic back pain that had become worse over the preceding months, and she could no longer ambulate with her cane and became essentially bedbound.  Further work-up in the ER was concerning for lactic acidosis, leukocytosis, and CXR concerning for medial right lower lobe pneumonia.  Treated with a fluid bolus, pain medications, and Zosyn and vancomycin, ultimately admitted for further work-up.    She was found to have leukocytosis and lactic acidosis, and a CXR was concerning for right-sided pneumonia.  She had a cough for about a week with symptoms of regurgitation.  A blood culture was negative.  Diagnosed with CAP and severe sepsis, she was treated with ceftriaxone and azithromycin with a transition to oral Augmentin.    She has had chronic back pain for some time.  Further work-up in the ER was concerning for severe multilevel spinal stenosis with bilateral neuroforaminal stenosis and compression of bilateral exiting cauda equina nerve roots, left greater than right.  Spine surgery was consulted with ensuing discussion with the patient and her son,  and it was recommended that she recover from pneumonia and then reassess the need for surgical intervention.  Later during her stay, she underwent an epidural steroid injection (03/11) with good effect, and it made it possible for her to participate with therapy and ambulate easier.    Incidental finding on MRI of the thoracic spine showed a small, well-circumscribed enhancing lesion along the left anterior lateral paravertebral soft tissue, measuring 1.2 x 1.1 x 1.4 cm.  Nonspecific finding could represent neurofibroma/schwannoma, nerve sheath tumor, metastatic lymph node, or atypical metastatic mass.  Radiologist recommended CT chest for further assessment.  This was ordered by neurosurgery, and it showed a mass in the last anterior paravertebral fat posterior to the descending thoracic aorta, most likely representing lymph node with recommendation for 6 months follow-up CT.    She also has essential hypertension that has required multiple medications, including triamterene hydrochlorothiazide (which she takes for Ménière's disease) and increasing doses of amlodipine.  She was started on carvedilol with good control and was kept on this at discharge.    As noted, she also has Ménière's disease for which, in addition to the triamterene hydrochlorothiazide, she also takes prednisone.  She stated that the condition is not severe and is only problematic if she gets up too fast.    She has a mood disorder for which she receives duloxetine, Lamictal, and as needed lorazepam for anxiety.    Overactive bladder places her on oxybutynin.  Occasional urge incontinence.    Diagnosis of GERD, and she did have symptoms of food stuck in her esophagus.  A swallow study was not concerning for aspiration.  Consider follow-up with GI if symptoms persist or recur and you may want to consider esophagogram to evaluate for motility issues.     She developed a right antecubital skin rash, likely secondary to tape.  Orders were given for  "0.1% triamcinolone cream.    She has diabetes mellitus type 2 with hyperglycemia.  Her last A1c was 8.8.  She was on insulin while inpatient, and hyperglycemia was felt secondary to a steroid injection, and she continued to improve.  She is adamant against giving herself insulin.  She was ultimately ready for discharge to the TCU.    Suspected chronic kidney disease.  While she was not prescribed an ACE/ARB in the hospital, CKD was listed as the reason.        CURRENT/RECENT TCU ISSUES    Disposition: She is seen today in her room.  We communicate with a combination of lipreading from a distance as well as interpretation by her son who is in attendance.  This was a very long meeting, beginning at 10 AM and ending at 11:18 AM.  We went over most of her diagnoses and medications, leading to several changes as noted below.    She complains she is still in a lot of pain in the neck and left arm.  She is a clock watcher.  Nursing staff say she requests Percocet almost exactly every four hours (when it is due).  Prior to my last visit, the nurse stated \"that's all she thinks about is, when her next pain pill is due.\" She says her muscle relaxant makes her sleepy, and last visit it was decreased after increasing the dose to 750 mg, we decreased it back to the 500 mg dose, at which point, she stated that it helped with her drowsiness. Therapy is present in the beginning of the visit and he states that she is now dragging her right foot while ambulating and was not doing this when first admitted. The patient believes it is her left leg that is weaker, she states she feels it is going to \"buckle\".     According to physical therapy, she couldn't do much in the way of exercises because of her pain.  Localized in the right lateral thigh (new).  This is the foot she has been dragging a bit.    Tonya was discharged in the hospital discharge summary as full code; however, at this juncture she would prefer to be DNR.  She appears to " "play the role of ricka queen during our initial visit and it does a good deal of coughing (although her lungs are clear).  She is extremely loquacious, and it is often difficult to get a word in edge wise.    Pain management: She continues to endorse pain in the neck and both arms, but especially the right shoulder with \"tightening\" that goes across her chest.. She also endorses pain at the waist but says her pain is relieved while sitting or lying down. She has orders for muscle relaxers (methocarbamol 500 mg 4 times daily, scheduled oxycodone 5 mg every 6 hours and Percocet (5/325 mg every 4 hours as needed).  She also has a lidocaine patch worn her back just below the neck.  She states her pain can \"make her lose her mind\". She stated she saw [facility] pain clinic in [place] and they explored instilling a pump, however, she was not amenable to this since she is more susceptible to infection due to her chronic prednisone use.  She had also received an ineffective injection of Toradol into her left arm.  An epidural was also ineffective.  I suggested an alternative, meloxicam which we initiated at 7.5 mg daily.  With the patient complaining that it is ineffective, we are going to try increasing the meloxicam to 15 mg daily.    Anxiety: She is currently experiencing a good deal of anxiety.  She has orders for as needed diazepam (0.5 mg twice daily) and she might very well benefit from scheduling every 12 hours.  She does find this effective, although she told me she really wasn't taking it at home.    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  Still, she does manage to hear well enough to carry on a conversation, occasionally needing to resort to writing things down.    Essential hypertension/chronic kidney disease: Diagnosed with CKD stage IIIa.  Nonetheless, we'll be prescribing lisinopril 5 mg daily.  We are going to follow-up with a BMP on 04/07/2022.    Constipation: She earlier endorsed " "constipation, describing \"meatballs.\"  Now, she has loose stools.  When seen by Dr. Silverman on 03/18, they did discuss the possibility of giving Tonya a probiotic and, if the stools became worse and more watery, her stool would be tested for C. Difficile. She states she has a history of IBS which has mostly been diarrhea, however, she now states she has constipation.     Diabetes mellitus type 2: She is getting a steroids for her rash and states that it is raising her blood glucose levels; however, she has been on oral medications at home, recently presenting with an A1c of 8.8 as noted.  She also had some glycosuria in the hospital.  She adamantly refuses to accept any idea of giving herself insulin at home.  We are going to increasing metformin from 250 mg twice daily to 500 mg twice daily.  Previously, and the dose higher than this would cause diarrhea.    Urinary incontinence:  Already receiving oxybutynin XL 10 mg daily, we are going to try increasing the dose to 15 mg daily, although I suspect this will also prove ineffective.  She also has frequent urinary tract infections.    Lower extremity edema: I spoke with her son at length about compression stockings and showed him where he could get them at a discount and what size/style to purchase.    Contacts: Davie, her son [phone]). We had a thorough discussion today regarding pain management for her mother, it was recommended that she seek consult from her previous pain clinic ([facility]), or a new one.    ROS:    Positives: As described above.  Also, rash under breasts and to groin.  Negatives: No headaches or chest pains, coughing or congestion, nausea or vomiting, dyspnea, dysuria, difficulty chewing or swallowing, or any problems with appetite or sleep.      Past Medical History:   Diagnosis Date     Abdominal pain      Anxiety      Arthritis      Asthma      Bipolar 1 disorder (H)      Chronic pain      Cochlear hydrops 1988    steriods and diazide     COPD " (chronic obstructive pulmonary disease) (H)      Depression      Diabetes mellitus (H)      Dyspepsia      GERD (gastroesophageal reflux disease)      Hard of hearing     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     Hepatic abscess      Hyperlipidemia      Hypertension      Irritable bowel syndrome      Meniere disease      Neuropathy      Noninfectious ileitis      Peritoneal abscess (H)      Spinal stenosis of lumbar region      Steroid long-term use      Vaginitis, atrophic, postmenopausal      Vitamin D deficiency               Family History   Problem Relation Age of Onset     Cerebrovascular Disease Mother      Heart Disease Mother      Heart Disease Father      Heart Disease Brother      Diabetes No family hx of      Coronary Artery Disease No family hx of      Hypertension No family hx of      Hyperlipidemia No family hx of      Breast Cancer No family hx of      Colon Cancer No family hx of      Prostate Cancer No family hx of      Other Cancer No family hx of      Depression No family hx of      Anxiety Disorder No family hx of      Mental Illness No family hx of      Substance Abuse No family hx of      Anesthesia Reaction No family hx of      Asthma No family hx of      Osteoporosis No family hx of      Genetic Disorder No family hx of      Thyroid Disease No family hx of      Obesity No family hx of      Unknown/Adopted No family hx of      Social History     Socioeconomic History     Marital status:      Spouse name: None     Number of children: None     Years of education: None     Highest education level: None   Occupational History     None   Tobacco Use     Smoking status: Former Smoker     Types: Cigarettes     Quit date: 11/15/1987     Years since quittin.3     Smokeless tobacco: Former User   Substance and Sexual Activity     Alcohol use: Not Currently     Alcohol/week: 0.0 standard drinks     Comment: MALISSA white since      Drug use: No     Comment: used emma in the  past     Sexual activity: Never     Partners: Male   Other Topics Concern     Parent/sibling w/ CABG, MI or angioplasty before 65F 55M? No   Social History Narrative     None     Social Determinants of Health     Financial Resource Strain: Not on file   Food Insecurity: Not on file   Transportation Needs: Not on file   Physical Activity: Not on file   Stress: Not on file   Social Connections: Not on file   Intimate Partner Violence: Not on file   Housing Stability: Not on file       MEDICATIONS: Reviewed from the MAR, physician orders, and/or earlier progress notes.  Post Discharge Medication Reconciliation Status: medication reconcilation previously completed during another office visit.    Current Outpatient Medications   Medication Sig     lisinopril (ZESTRIL) 5 MG tablet Take 5 mg by mouth daily     meloxicam (MOBIC) 15 MG tablet Take 15 mg by mouth daily     acetaminophen (TYLENOL) 325 MG tablet Take 1-2 tablets (325-650 mg) by mouth every 6 hours as needed for mild pain     amLODIPine (NORVASC) 10 MG tablet Take 1 tablet (10 mg) by mouth daily     atorvastatin (LIPITOR) 10 MG tablet Take 10 mg by mouth At Bedtime     blood glucose (ACCU-CHEK FASTCLIX) lancing device FOR TESTING ONCE DAILY. DX  E11.9 TYPE 2 DIABETES     blood glucose (CONTOUR TEST) test strip TESTING EVERY DAY DX  E11.9     carvedilol (COREG) 3.125 MG tablet Take 1 tablet (3.125 mg) by mouth 2 times daily (with meals)     CONTOUR NEXT TEST test strip TESTING EVERY DAY DX  E11.9     cyanocobalamin 1000 MCG SUBL Place 1,000 mcg under the tongue daily     diazepam (VALIUM) 1 MG/ML solution Take 0.5 mLs (0.5 mg) by mouth 2 times daily as needed for agitation, anxiety or muscle spasms (Patient taking differently: Take 0.5 mg by mouth every 12 hours )     DULoxetine (CYMBALTA) 60 MG EC capsule TAKE 1 CAPSULE (60 MG) BY MOUTH DAILY     fluconazole (DIFLUCAN) 150 MG tablet Take 150 mg by mouth See Admin Instructions One dose prn yeast infection       gabapentin (NEURONTIN) 100 MG capsule Take 2 capsules (200 mg) by mouth 2 times daily     glipiZIDE (GLUCOTROL) 10 MG tablet Take 1 tablet (10 mg) by mouth 2 times daily (before meals)     guaiFENesin-dextromethorphan (ROBITUSSIN DM) 100-10 MG/5ML syrup Take 10 mLs by mouth every 4 hours as needed for cough or congestion (productive cough)     JARDIANCE 25 MG TABS tablet Take 25 mg by mouth daily      lamoTRIgine (LAMICTAL) 100 MG tablet Take 100 mg by mouth 2 times daily     lidocaine (LMX4) 4 % external cream Apply topically 4 times daily as needed for mild pain or moderate pain Apply to painful areas, avoid red or open skin, avoid heat over ointment     metFORMIN (GLUCOPHAGE) 500 MG tablet Take 0.5 tablets (250 mg) by mouth 2 times daily (with meals) (Patient taking differently: Take 500 mg by mouth 2 times daily (with meals) )     methocarbamol (ROBAXIN) 500 MG tablet Take 500 mg by mouth 4 times daily     metoclopramide (REGLAN) 10 MG tablet Take 10 mg by mouth 3 times daily as needed Before meals as needed.     nystatin (MYCOSTATIN) 411715 UNIT/GM external powder Apply topically 2 times daily as needed Breast rash     omeprazole (PRILOSEC) 20 MG DR capsule Take 20 mg by mouth daily      order for DME Equipment being ordered: wrist brace     oxybutynin ER (DITROPAN-XL) 10 MG 24 hr tablet Take 15 mg by mouth At Bedtime      oxyCODONE-acetaminophen (PERCOCET) 5-325 MG tablet Take 1 tablet by mouth 3 times daily. May also take 1 tablet every 4 hours as needed for severe pain. Scheduled times are 6 am, 2 pm, and 10 pm     predniSONE (DELTASONE) 1 MG tablet Take 6 mg by mouth daily 1mg tab and 5mg tab     triamcinolone (KENALOG) 0.1 % external cream Apply topically 2 times daily as needed      triamterene-HCTZ (MAXZIDE-25) 37.5-25 MG tablet Take 1 tablet by mouth daily     vitamin D3 (CHOLECALCIFEROL) 50 mcg (2000 units) tablet Take 3 tablets by mouth daily     No current facility-administered medications for this  "visit.     ALLERGIES:   Allergies   Allergen Reactions     Propofol Nausea and Vomiting     Shellfish Allergy      Other reaction(s): Dizziness       DIET: Diabetic, regular texture, thin liquids.    Vitals:    04/01/22 1253   BP: (!) 169/85   Pulse: 85   Resp: 18   Temp: 97.4  F (36.3  C)   SpO2: 96%   Weight: 88.4 kg (194 lb 12.8 oz)   Height: 1.651 m (5' 5\")     Body mass index is 32.42 kg/m .    EXAMINATION:   General: Pleasant, very loquacious elderly female, sitting in the chair but would be better served with a recliner.   Head: Normocephalic and atraumatic.   Eyes: PERRLA, sclerae clear.   ENT: Moist oral mucosa.  Upper dentures and some remaining lower teeth.  Cardiovascular: Regular rate and rhythm with a 2/6 systolic ejection murmur with split S2 at the left sternal border.   Respiratory: Lungs clear to auscultation bilaterally.   Abdomen: Nondistended.   Musculoskeletal/Extremities: Age-related degenerative joint disease.  Bilateral trace to 1+ pedal edema.  Integument: Remnants of right antecubital rash.  Cognitive/Psychiatric: Alert and oriented x4.  Affect is euthymic.    DIAGNOSTICS:   No results found for this or any previous visit (from the past 240 hour(s)).     Last Comprehensive Metabolic Panel:  Sodium   Date Value Ref Range Status   03/07/2022 139 136 - 145 mmol/L Final   10/24/2017 139 133 - 144 mmol/L Final     Potassium   Date Value Ref Range Status   03/07/2022 3.8 3.5 - 5.0 mmol/L Final   10/24/2017 3.9 3.4 - 5.3 mmol/L Final     Chloride   Date Value Ref Range Status   03/07/2022 100 98 - 107 mmol/L Final   10/24/2017 101 94 - 109 mmol/L Final     Carbon Dioxide   Date Value Ref Range Status   10/24/2017 28 20 - 32 mmol/L Final     Carbon Dioxide (CO2)   Date Value Ref Range Status   03/07/2022 25 22 - 31 mmol/L Final     Anion Gap   Date Value Ref Range Status   03/07/2022 14 5 - 18 mmol/L Final   10/24/2017 10 3 - 14 mmol/L Final     Glucose   Date Value Ref Range Status   03/07/2022 " 282 (H) 70 - 125 mg/dL Final   10/24/2017 116 (H) 70 - 99 mg/dL Final     GLUCOSE BY METER POCT   Date Value Ref Range Status   03/13/2022 176 (H) 70 - 99 mg/dL Final     Urea Nitrogen   Date Value Ref Range Status   03/07/2022 23 8 - 28 mg/dL Final   10/24/2017 18 7 - 30 mg/dL Final     Creatinine   Date Value Ref Range Status   03/07/2022 1.07 0.60 - 1.10 mg/dL Final   10/24/2017 0.84 0.52 - 1.04 mg/dL Final     GFR Estimate   Date Value Ref Range Status   03/07/2022 53 (L) >60 mL/min/1.73m2 Final     Comment:     Effective December 21, 2021 eGFRcr in adults is calculated using the 2021 CKD-EPI creatinine equation which includes age and gender (Teena hampton al., NEJ, DOI: 10.1056/VGQEki4115693)   12/29/2020 >60 >60 mL/min/1.73m2 Final   10/24/2017 66 >60 mL/min/1.7m2 Final     Comment:     Non  GFR Calc     Calcium   Date Value Ref Range Status   03/07/2022 9.0 8.5 - 10.5 mg/dL Final   10/24/2017 9.5 8.5 - 10.1 mg/dL Final     Bilirubin Total   Date Value Ref Range Status   07/19/2021 0.7 0.0 - 1.0 mg/dL Final   02/14/2017 0.4 0.2 - 1.3 mg/dL Final     Alkaline Phosphatase   Date Value Ref Range Status   07/19/2021 159 (H) 45 - 120 U/L Final   02/14/2017 85 40 - 150 U/L Final     ALT   Date Value Ref Range Status   07/19/2021 52 (H) 0 - 45 U/L Final   02/14/2017 26 0 - 50 U/L Final     AST   Date Value Ref Range Status   07/19/2021 28 0 - 40 U/L Final   02/14/2017 25 0 - 45 U/L Final       Lab Results   Component Value Date    WBC 8.9 03/11/2022    WBC 8.9 04/12/2016     Lab Results   Component Value Date    RBC 3.86 03/11/2022    RBC 3.99 04/12/2016     Lab Results   Component Value Date    HGB 8.7 03/11/2022    HGB 12.2 10/24/2017     Lab Results   Component Value Date    HCT 32.4 03/11/2022    HCT 36.0 04/12/2016     Lab Results   Component Value Date    MCV 84 03/11/2022    MCV 90 04/12/2016     Lab Results   Component Value Date    MCH 22.5 03/11/2022    MCH 26.1 04/12/2016     Lab Results    Component Value Date    MCHC 26.9 03/11/2022    MCHC 28.9 04/12/2016     Lab Results   Component Value Date    RDW 16.8 03/11/2022    RDW 15.8 04/12/2016     Lab Results   Component Value Date     03/11/2022     04/12/2016       ASSESSMENT/Plan:      ICD-10-CM    1. Pneumonia of right lower lobe due to infectious organism  J18.9    2. History of community acquired pneumonia, right lower lobe  Z87.01    3. History of severe sepsis  Z86.19    4. Neural foraminal stenosis of cervical spine, bilateral  M48.02    5. Paravertebral mass  R22.2    6. Multilevel spondylosis  M47.819    7. Type 2 diabetes mellitus with hyperglycemia, unspecified whether long term insulin use (H)  E11.65    8. Stage 3a chronic kidney disease (H)  N18.31    9. Spasm of back muscles  M62.830    10. Bilateral hearing loss, unspecified hearing loss type  H91.93    11. Bipolar 1 disorder, depressed, full remission (H)  F31.76    12. 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 Anxiety and depression  F41.9     F32.A    14. Essential hypertension  I10    15. Chronic bilateral back pain, unspecified back location  M54.9     G89.29    16. Mixed hyperlipidemia  E78.2    17. Slow transit constipation  K59.01        CHANGES:    1.  Increase meloxicam from 7.5 mg tab daily to 15 mg daily.  2.  Increase Metformin from 250 mg twice daily to 500 mg twice daily.  3.  Increase oxybutynin XL from 10 mg daily to 15 mg daily.  4.  Add lisinopril 5 mg daily (in response to Best Practice Advisory.  5.  BMP on 04/07/2022.  6.  Okay for compression stockings that son will supply.  On in the morning and off at bedtime.    CARE PLAN:    The care plan, medications, vital signs, orders, and nursing notes have been reviewed, and all orders signed. Changes to care plan, if any, as noted. Otherwise, continue current plan of care.  Total time spent in this encounter was 78 minutes, with  greater than 50% spent in counseling and coordination of care that included a review of most of her diagnoses and medications, with several changes being made.    The above has been created using voice recognition software. Please be aware that this may unintentionally  produce inaccuracies and/or nonsensical sentences.      Electronically signed by: JUAN ANTONIO Ching CNP

## 2022-04-05 NOTE — PROGRESS NOTES
[facility] Geriatric[facility]    Name:   Tonya Yang  :   1940  MRN:    [id number]     Facility:   [facility] ([facility]) [[id number]]   Room: [facility] / [address]  Code Status: DNR and POLST AVAILABLE - Had been full code in the hospital    DOS:  2022  Previous visit: 2022    PCP:  Ananya Mclean NP     CHIEF COMPLAINT / REASON FOR VISIT:  Chief Complaint   Patient presents with     Clinic Care Coordination - Follow-up     CAP, severe sepsis, acute on chronic back pain      [facility] from 2022 until 2022 (pneumonia, severe sepsis, acute on chronic back pain)      HPI: Tonya is a 81 year old female with a past medical history significant for severe spinal stenosis (multilevel), polyarthralgia, Ménière's disease/cochlear hydrops, diabetes mellitus type 2 (with nephropathy), who presented with acute on chronic back pain that had become worse over the preceding months, and she could no longer ambulate with her cane and became essentially bedbound.  Further work-up in the ER was concerning for lactic acidosis, leukocytosis, and CXR concerning for medial right lower lobe pneumonia.  Treated with a fluid bolus, pain medications, and Zosyn and vancomycin, ultimately admitted for further work-up.    She was found to have leukocytosis and lactic acidosis, and a CXR was concerning for right-sided pneumonia.  She had a cough for about a week with symptoms of regurgitation.  A blood culture was negative.  Diagnosed with CAP and severe sepsis, she was treated with ceftriaxone and azithromycin with a transition to oral Augmentin.    She has had chronic back pain for some time.  Further work-up in the ER was concerning for severe multilevel spinal stenosis with bilateral neuroforaminal stenosis and compression of bilateral exiting cauda equina nerve roots, left greater than right.  Spine surgery was consulted with ensuing discussion with the patient and her son,  and it was recommended that she recover from pneumonia and then reassess the need for surgical intervention.  Later during her stay, she underwent an epidural steroid injection (03/11) with good effect, and it made it possible for her to participate with therapy and ambulate easier.    Incidental finding on MRI of the thoracic spine showed a small, well-circumscribed enhancing lesion along the left anterior lateral paravertebral soft tissue, measuring 1.2 x 1.1 x 1.4 cm.  Nonspecific finding could represent neurofibroma/schwannoma, nerve sheath tumor, metastatic lymph node, or atypical metastatic mass.  Radiologist recommended CT chest for further assessment.  This was ordered by neurosurgery, and it showed a mass in the last anterior paravertebral fat posterior to the descending thoracic aorta, most likely representing lymph node with recommendation for 6 months follow-up CT.    She also has essential hypertension that has required multiple medications, including triamterene hydrochlorothiazide (which she takes for Ménière's disease) and increasing doses of amlodipine.  She was started on carvedilol with good control and was kept on this at discharge.    As noted, she also has Ménière's disease for which, in addition to the triamterene hydrochlorothiazide, she also takes prednisone.  She stated that the condition is not severe and is only problematic if she gets up too fast.    She has a mood disorder for which she receives duloxetine, Lamictal, and as needed lorazepam for anxiety.    Overactive bladder places her on oxybutynin.  Occasional urge incontinence.    Diagnosis of GERD, and she did have symptoms of food stuck in her esophagus.  A swallow study was not concerning for aspiration.  Consider follow-up with GI if symptoms persist or recur and you may want to consider esophagogram to evaluate for motility issues.     She developed a right antecubital skin rash, likely secondary to tape.  Orders were given for  "0.1% triamcinolone cream.    She has diabetes mellitus type 2 with hyperglycemia.  Her last A1c was 8.8.  She was on insulin while inpatient, and hyperglycemia was felt secondary to a steroid injection, and she continued to improve.  She is adamant against giving herself insulin.  She was ultimately ready for discharge to the TCU.    Suspected chronic kidney disease.  While she was not prescribed an ACE/ARB in the hospital, CKD was listed as the reason.      Tonya was discharged in the hospital discharge summary as full code; however, at this juncture she would prefer to be DNR.        CURRENT/RECENT TCU ISSUES    Disposition:  We communicate with a combination of lipreading from a distance as well as a loud voice.  This was a fairly long visit, despite family not being there.  We again went over her many problems.  Pain has been her biggest issue, and I have finally consented to going for a dose increase of her Percocet (going from 1 tablet to 1.5 tablets, see orders below).  This is something we will need to monitor closely.  I understand that she had reneged on a controlled substance agreement; however, she describes her son as being at wits end with all her problems.  I offered to do the best I could to remedy them.  According to PT, she couldn't do much in the way of exercises because of her pain.  TODAY, she complains of spasms in her left leg.  She describes a \"sharp spasm\" just under the knee.    Pain management:  Pain is as usual, and the back of the neck, the left arm, the right lateral thigh, and the location of her pain can vary from day-to-day.  Getting in and out of bed is \"the worst.\"  I do believe that she is in a fair amount of pain, although it is intermittent (i.e., when moving about), and I don't suspect this is the answer.  I did suggest she be seen at the pain clinic.  We have tried methocarbamol with limited success, and she complained that it made her sleepy.  We have added meloxicam, " "subsequently increasing the dose, again with insufficient relief.  Today, she actually asked if I could increase the meloxicam further, but I informed her we were at the maximum dose.    Anxiety: She appears to be in an almost perpetual state of anxiety while in the TCU.  Unsure whether this is her baseline or not.  She has orders for as needed diazepam (0.5 mg twice daily) and she might very well benefit from scheduling every 12 hours.  She does find this effective, although she told me she really wasn't taking it at home.    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  Still, she does manage to hear well enough to carry on a conversation, occasionally needing to resort to writing things down.    Essential hypertension/chronic kidney disease: Diagnosed with CKD stage IIIa.  Nonetheless, we prescribed lisinopril 5 mg daily.  Follow-up BMP on 04/07/2022 (already ordered).  Actually, over the last several days, blood pressures look much better, with systolics ranging between 118 and 135, diastolics between 66 and 71.    Constipation: A bit improved.    Diabetes mellitus type 2: Still receiving steroids for a rash, thereby raising her blood glucose levels.  She has been on oral medications at home, though recently presenting with an A1c of 8.8.  She also had some glycosuria in the hospital.  She adamantly refuses to accept any idea of giving herself insulin at home.  We increased metformin from 250 mg twice daily to 500 mg twice daily.  Previously, the dose higher than this would cause diarrhea.  Continue monitoring.    Urinary incontinence:  Already receiving oxybutynin XL 10 mg daily, we tried increasing the dose to 15 mg daily, and as one might suspect, it was still ineffective.  I told her we were going to discontinue it and try something new.  Hopefully, Myrbetriq 24-hour 25 mg daily will be covered by her insurance.  There is another option to add solifenacin (5 to 10 mg/day).  She also " "has frequent urinary tract infections.    Lower extremity edema: I spoke with her son at length about compression stockings and showed him where he could get them at a discount and what size/style to purchase.  She is currently wearing ones without sufficient compression, but they are better than nothing.  In addition to the edema, however, I am informed that she has gained 15 pounds since admission here.  Suspecting she may end up in fluid overload (despite not having a diagnosis of CHF), we will add 20 mg of furosemide daily.  As noted, a metabolic profile has already been requested.    Contacts: Davie, her son [phone]). We had a thorough discussion today regarding pain management for her mother, it was recommended that she seek consult from her previous pain clinic ([facility]), or a new one.    ROS:    Positives: As described above.  Also complaining of dry mouth (always is sucking on ice chips) and dry eyes (has some eyedrops she doesn't believe are working).  It sounds like Sjogren's syndrome; however, she immediately says, \"no more pills.\"  Negatives: No headaches or chest pains, coughing or congestion, nausea or vomiting, dyspnea, dysuria, difficulty chewing or swallowing, or any problems with appetite or sleep.      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     Hepatic abscess      Hyperlipidemia      Hypertension      Irritable bowel syndrome      Meniere disease      Neuropathy      Noninfectious ileitis      Peritoneal abscess (H)      Spinal stenosis of lumbar region      Steroid long-term use      Vaginitis, atrophic, postmenopausal  "     Vitamin D deficiency               Family History   Problem Relation Age of Onset     Cerebrovascular Disease Mother      Heart Disease Mother      Heart Disease Father      Heart Disease Brother      Diabetes No family hx of      Coronary Artery Disease No family hx of      Hypertension No family hx of      Hyperlipidemia No family hx of      Breast Cancer No family hx of      Colon Cancer No family hx of      Prostate Cancer No family hx of      Other Cancer No family hx of      Depression No family hx of      Anxiety Disorder No family hx of      Mental Illness No family hx of      Substance Abuse No family hx of      Anesthesia Reaction No family hx of      Asthma No family hx of      Osteoporosis No family hx of      Genetic Disorder No family hx of      Thyroid Disease No family hx of      Obesity No family hx of      Unknown/Adopted No family hx of      Social History     Socioeconomic History     Marital status:      Spouse name: None     Number of children: None     Years of education: None     Highest education level: None   Occupational History     None   Tobacco Use     Smoking status: Former Smoker     Types: Cigarettes     Quit date: 11/15/1987     Years since quittin.3     Smokeless tobacco: Former User   Substance and Sexual Activity     Alcohol use: Not Currently     Alcohol/week: 0.0 standard drinks     Comment: MALISSA white since      Drug use: No     Comment: used marijuanna in the past     Sexual activity: Never     Partners: Male   Other Topics Concern     Parent/sibling w/ CABG, MI or angioplasty before 65F 55M? No   Social History Narrative     None     Social Determinants of Health     Financial Resource Strain: Not on file   Food Insecurity: Not on file   Transportation Needs: Not on file   Physical Activity: Not on file   Stress: Not on file   Social Connections: Not on file   Intimate Partner Violence: Not on file   Housing Stability: Not on file       MEDICATIONS:  Reviewed from the MAR, physician orders, and/or earlier progress notes.  Post Discharge Medication Reconciliation Status: medication reconcilation previously completed during another office visit.    Current Outpatient Medications   Medication Sig     acetaminophen (TYLENOL) 325 MG tablet Take 1-2 tablets (325-650 mg) by mouth every 6 hours as needed for mild pain     amLODIPine (NORVASC) 10 MG tablet Take 1 tablet (10 mg) by mouth daily     atorvastatin (LIPITOR) 10 MG tablet Take 10 mg by mouth At Bedtime     blood glucose (ACCU-CHEK FASTCLIX) lancing device FOR TESTING ONCE DAILY. DX  E11.9 TYPE 2 DIABETES     blood glucose (CONTOUR TEST) test strip TESTING EVERY DAY DX  E11.9     carvedilol (COREG) 3.125 MG tablet Take 1 tablet (3.125 mg) by mouth 2 times daily (with meals)     CONTOUR NEXT TEST test strip TESTING EVERY DAY DX  E11.9     cyanocobalamin 1000 MCG SUBL Place 1,000 mcg under the tongue daily     diazepam (VALIUM) 1 MG/ML solution Take 0.5 mLs (0.5 mg) by mouth 2 times daily as needed for agitation, anxiety or muscle spasms (Patient taking differently: Take 0.5 mg by mouth every 12 hours )     DULoxetine (CYMBALTA) 60 MG EC capsule TAKE 1 CAPSULE (60 MG) BY MOUTH DAILY     fluconazole (DIFLUCAN) 150 MG tablet Take 150 mg by mouth See Admin Instructions One dose prn yeast infection      gabapentin (NEURONTIN) 100 MG capsule Take 2 capsules (200 mg) by mouth 2 times daily     glipiZIDE (GLUCOTROL) 10 MG tablet Take 1 tablet (10 mg) by mouth 2 times daily (before meals)     guaiFENesin-dextromethorphan (ROBITUSSIN DM) 100-10 MG/5ML syrup Take 10 mLs by mouth every 4 hours as needed for cough or congestion (productive cough)     JARDIANCE 25 MG TABS tablet Take 25 mg by mouth daily      lamoTRIgine (LAMICTAL) 100 MG tablet Take 100 mg by mouth 2 times daily     lidocaine (LMX4) 4 % external cream Apply topically 4 times daily as needed for mild pain or moderate pain Apply to painful areas, avoid red or  "open skin, avoid heat over ointment     lisinopril (ZESTRIL) 5 MG tablet Take 5 mg by mouth daily     meloxicam (MOBIC) 15 MG tablet Take 15 mg by mouth daily     metFORMIN (GLUCOPHAGE) 500 MG tablet Take 0.5 tablets (250 mg) by mouth 2 times daily (with meals) (Patient taking differently: Take 500 mg by mouth 2 times daily (with meals) )     methocarbamol (ROBAXIN) 500 MG tablet Take 500 mg by mouth 4 times daily     metoclopramide (REGLAN) 10 MG tablet Take 10 mg by mouth 3 times daily as needed Before meals as needed.     nystatin (MYCOSTATIN) 350958 UNIT/GM external powder Apply topically 2 times daily as needed Breast rash     omeprazole (PRILOSEC) 20 MG DR capsule Take 20 mg by mouth daily      order for DME Equipment being ordered: wrist brace     predniSONE (DELTASONE) 1 MG tablet Take 6 mg by mouth daily 1mg tab and 5mg tab     triamcinolone (KENALOG) 0.1 % external cream Apply topically 2 times daily as needed      triamterene-HCTZ (MAXZIDE-25) 37.5-25 MG tablet Take 1 tablet by mouth daily     vitamin D3 (CHOLECALCIFEROL) 50 mcg (2000 units) tablet Take 3 tablets by mouth daily     No current facility-administered medications for this visit.     ALLERGIES:   Allergies   Allergen Reactions     Propofol Nausea and Vomiting     Shellfish Allergy      Other reaction(s): Dizziness       DIET: Diabetic, regular texture, thin liquids.    Vitals:    04/04/22 1113   BP: 128/66   Pulse: 87   Resp: 17   Temp: 98.9  F (37.2  C)   SpO2: 93%   Weight: 88.4 kg (194 lb 12.8 oz)   Height: 1.651 m (5' 5\")     Body mass index is 32.42 kg/m .    EXAMINATION:   General: Pleasant, very loquacious elderly female, sitting on her bed, in no apparent distress.   Head: Normocephalic and atraumatic.   Eyes: PERRLA, sclerae clear.   ENT: Moist oral mucosa.  Upper dentures and some remaining lower teeth.  Cardiovascular: Regular rate and rhythm with a 2/6 systolic ejection murmur with split S2 at the left sternal border. "   Respiratory: Lungs clear to auscultation bilaterally.   Abdomen: Nondistended.   Musculoskeletal/Extremities: Age-related degenerative joint disease.  Bilateral trace to 1+ pedal edema.  Integument: Remnants of right antecubital rash.  Cognitive/Psychiatric: Alert and oriented x4.  Affect is euthymic.    DIAGNOSTICS:   No results found for this or any previous visit (from the past 240 hour(s)).     Last Comprehensive Metabolic Panel:  Sodium   Date Value Ref Range Status   03/07/2022 139 136 - 145 mmol/L Final   10/24/2017 139 133 - 144 mmol/L Final     Potassium   Date Value Ref Range Status   03/07/2022 3.8 3.5 - 5.0 mmol/L Final   10/24/2017 3.9 3.4 - 5.3 mmol/L Final     Chloride   Date Value Ref Range Status   03/07/2022 100 98 - 107 mmol/L Final   10/24/2017 101 94 - 109 mmol/L Final     Carbon Dioxide   Date Value Ref Range Status   10/24/2017 28 20 - 32 mmol/L Final     Carbon Dioxide (CO2)   Date Value Ref Range Status   03/07/2022 25 22 - 31 mmol/L Final     Anion Gap   Date Value Ref Range Status   03/07/2022 14 5 - 18 mmol/L Final   10/24/2017 10 3 - 14 mmol/L Final     Glucose   Date Value Ref Range Status   03/07/2022 282 (H) 70 - 125 mg/dL Final   10/24/2017 116 (H) 70 - 99 mg/dL Final     GLUCOSE BY METER POCT   Date Value Ref Range Status   03/13/2022 176 (H) 70 - 99 mg/dL Final     Urea Nitrogen   Date Value Ref Range Status   03/07/2022 23 8 - 28 mg/dL Final   10/24/2017 18 7 - 30 mg/dL Final     Creatinine   Date Value Ref Range Status   03/07/2022 1.07 0.60 - 1.10 mg/dL Final   10/24/2017 0.84 0.52 - 1.04 mg/dL Final     GFR Estimate   Date Value Ref Range Status   03/07/2022 53 (L) >60 mL/min/1.73m2 Final     Comment:     Effective December 21, 2021 eGFRcr in adults is calculated using the 2021 CKD-EPI creatinine equation which includes age and gender (Teena et al., NEJM, DOI: 10.1056/MWOFzk6492131)   12/29/2020 >60 >60 mL/min/1.73m2 Final   10/24/2017 66 >60 mL/min/1.7m2 Final     Comment:      Non  GFR Calc     Calcium   Date Value Ref Range Status   03/07/2022 9.0 8.5 - 10.5 mg/dL Final   10/24/2017 9.5 8.5 - 10.1 mg/dL Final     Bilirubin Total   Date Value Ref Range Status   07/19/2021 0.7 0.0 - 1.0 mg/dL Final   02/14/2017 0.4 0.2 - 1.3 mg/dL Final     Alkaline Phosphatase   Date Value Ref Range Status   07/19/2021 159 (H) 45 - 120 U/L Final   02/14/2017 85 40 - 150 U/L Final     ALT   Date Value Ref Range Status   07/19/2021 52 (H) 0 - 45 U/L Final   02/14/2017 26 0 - 50 U/L Final     AST   Date Value Ref Range Status   07/19/2021 28 0 - 40 U/L Final   02/14/2017 25 0 - 45 U/L Final       Lab Results   Component Value Date    WBC 8.9 03/11/2022    WBC 8.9 04/12/2016     Lab Results   Component Value Date    RBC 3.86 03/11/2022    RBC 3.99 04/12/2016     Lab Results   Component Value Date    HGB 8.7 03/11/2022    HGB 12.2 10/24/2017     Lab Results   Component Value Date    HCT 32.4 03/11/2022    HCT 36.0 04/12/2016     Lab Results   Component Value Date    MCV 84 03/11/2022    MCV 90 04/12/2016     Lab Results   Component Value Date    MCH 22.5 03/11/2022    MCH 26.1 04/12/2016     Lab Results   Component Value Date    MCHC 26.9 03/11/2022    MCHC 28.9 04/12/2016     Lab Results   Component Value Date    RDW 16.8 03/11/2022    RDW 15.8 04/12/2016     Lab Results   Component Value Date     03/11/2022     04/12/2016       ASSESSMENT/Plan:      ICD-10-CM    1. Pneumonia of right lower lobe due to infectious organism  J18.9    2. History of community acquired pneumonia, right lower lobe  Z87.01    3. History of severe sepsis  Z86.19    4. Neural foraminal stenosis of cervical spine, bilateral  M48.02    5. Paravertebral mass  R22.2    6. Multilevel spondylosis  M47.819    7. Type 2 diabetes mellitus with hyperglycemia, unspecified whether long term insulin use (H)  E11.65    8. Stage 3a chronic kidney disease (H)  N18.31    9. Spasm of back muscles  M62.830    10.  Bilateral hearing loss, unspecified hearing loss type  H91.93    11. Bipolar 1 disorder, depressed, full remission (H)  F31.76    12. 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 Anxiety and depression  F41.9     F32.A    14. Essential hypertension  I10    15. Chronic bilateral back pain, unspecified back location  M54.9     G89.29    16. Mixed hyperlipidemia  E78.2    17. Slow transit constipation  K59.01        CHANGES:    1.  Increase oxycodone-acetaminophen from 5 mg to 7.5 mg (1.5 tabs), 1-1/2 tabs every 8 hours scheduled +1-1/2 tabs every 6 hours as needed.  2.  Furosemide 20 mg daily for lower extremity edema and weight gain.  3.  Discontinue oxybutynin.  4.  Start Myrbetriq 24-hour 25 mg daily.  5.  Basic metabolic profile on 04/07/2022.    CARE PLAN:    The care plan, medications, vital signs, orders, and nursing notes have been reviewed, and all orders signed. Changes to care plan, if any, as noted. Otherwise, continue current plan of care.  Total time spent in this encounter was 37 minutes, with greater than 50% spent in counseling and coordination of care that again included a review of most significant diagnoses and all of her medications medications, with several changes being made.    The above has been created using voice recognition software. Please be aware that this may unintentionally  produce inaccuracies and/or nonsensical sentences.      Electronically signed by: JUAN ANTONIO Ching CNP

## 2022-04-06 ENCOUNTER — LAB REQUISITION (OUTPATIENT)
Dept: LAB | Facility: CLINIC | Age: 82
End: 2022-04-06
Payer: MEDICARE

## 2022-04-06 DIAGNOSIS — M48.061 SPINAL STENOSIS, LUMBAR REGION WITHOUT NEUROGENIC CLAUDICATION: ICD-10-CM

## 2022-04-06 DIAGNOSIS — G89.29 OTHER CHRONIC PAIN: ICD-10-CM

## 2022-04-06 DIAGNOSIS — E11.40 TYPE 2 DIABETES MELLITUS WITH DIABETIC NEUROPATHY, UNSPECIFIED (H): ICD-10-CM

## 2022-04-06 DIAGNOSIS — M54.41 LUMBAGO WITH SCIATICA, RIGHT SIDE: ICD-10-CM

## 2022-04-06 DIAGNOSIS — J18.1 LOBAR PNEUMONIA, UNSPECIFIED ORGANISM (H): ICD-10-CM

## 2022-04-07 ENCOUNTER — APPOINTMENT (OUTPATIENT)
Dept: RADIOLOGY | Facility: CLINIC | Age: 82
End: 2022-04-07
Attending: EMERGENCY MEDICINE
Payer: MEDICARE

## 2022-04-07 ENCOUNTER — HOSPITAL ENCOUNTER (OUTPATIENT)
Facility: CLINIC | Age: 82
Setting detail: OBSERVATION
Discharge: NURSING, REHAB OR LONG-TERM CARE | End: 2022-04-15
Attending: EMERGENCY MEDICINE | Admitting: EMERGENCY MEDICINE
Payer: MEDICARE

## 2022-04-07 DIAGNOSIS — R60.0 BILATERAL LEG EDEMA: ICD-10-CM

## 2022-04-07 DIAGNOSIS — M54.42 CHRONIC LEFT-SIDED LOW BACK PAIN WITH LEFT-SIDED SCIATICA: ICD-10-CM

## 2022-04-07 DIAGNOSIS — M62.830 SPASM OF BACK MUSCLES: Primary | ICD-10-CM

## 2022-04-07 DIAGNOSIS — G89.29 CHRONIC LEFT-SIDED LOW BACK PAIN WITH LEFT-SIDED SCIATICA: ICD-10-CM
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
## 2022-04-07 DIAGNOSIS — R53.1 WEAKNESS: ICD-10-CM

## 2022-04-07 DIAGNOSIS — Z79.899 POLYPHARMACY: ICD-10-CM

## 2022-04-07 LAB
ALBUMIN SERPL-MCNC: 3.6 G/DL (ref 3.5–5)
ALP SERPL-CCNC: 85 U/L (ref 45–120)
ALT SERPL W P-5'-P-CCNC: 19 U/L (ref 0–45)
ANION GAP SERPL CALCULATED.3IONS-SCNC: 12 MMOL/L (ref 5–18)
ANION GAP SERPL CALCULATED.3IONS-SCNC: 13 MMOL/L (ref 5–18)
AST SERPL W P-5'-P-CCNC: 19 U/L (ref 0–40)
BASOPHILS # BLD AUTO: 0.1 10E3/UL (ref 0–0.2)
BASOPHILS NFR BLD AUTO: 1 %
BILIRUB SERPL-MCNC: 0.4 MG/DL (ref 0–1)
BNP SERPL-MCNC: 17 PG/ML (ref 0–151)
BUN SERPL-MCNC: 19 MG/DL (ref 8–28)
BUN SERPL-MCNC: 21 MG/DL (ref 8–28)
CALCIUM SERPL-MCNC: 10 MG/DL (ref 8.5–10.5)
CALCIUM SERPL-MCNC: 10.3 MG/DL (ref 8.5–10.5)
CHLORIDE BLD-SCNC: 99 MMOL/L (ref 98–107)
CHLORIDE BLD-SCNC: 99 MMOL/L (ref 98–107)
CK SERPL-CCNC: 56 U/L (ref 30–190)
CO2 SERPL-SCNC: 29 MMOL/L (ref 22–31)
CO2 SERPL-SCNC: 32 MMOL/L (ref 22–31)
CREAT SERPL-MCNC: 0.85 MG/DL (ref 0.6–1.1)
CREAT SERPL-MCNC: 1 MG/DL (ref 0.6–1.1)
EOSINOPHIL # BLD AUTO: 0.3 10E3/UL (ref 0–0.7)
EOSINOPHIL NFR BLD AUTO: 3 %
ERYTHROCYTE [DISTWIDTH] IN BLOOD BY AUTOMATED COUNT: 16.8 % (ref 10–15)
GFR SERPL CREATININE-BSD FRML MDRD: 57 ML/MIN/1.73M2
GFR SERPL CREATININE-BSD FRML MDRD: 69 ML/MIN/1.73M2
GLUCOSE BLD-MCNC: 143 MG/DL (ref 70–125)
GLUCOSE BLD-MCNC: 225 MG/DL (ref 70–125)
GLUCOSE BLDC GLUCOMTR-MCNC: 135 MG/DL (ref 70–99)
GLUCOSE BLDC GLUCOMTR-MCNC: 90 MG/DL (ref 70–99)
HCT VFR BLD AUTO: 36.2 % (ref 35–47)
HGB BLD-MCNC: 9.7 G/DL (ref 11.7–15.7)
IMM GRANULOCYTES # BLD: 0 10E3/UL
IMM GRANULOCYTES NFR BLD: 0 %
LYMPHOCYTES # BLD AUTO: 1.5 10E3/UL (ref 0.8–5.3)
LYMPHOCYTES NFR BLD AUTO: 15 %
MAGNESIUM SERPL-MCNC: 1.8 MG/DL (ref 1.8–2.6)
MCH RBC QN AUTO: 22.4 PG (ref 26.5–33)
MCHC RBC AUTO-ENTMCNC: 26.8 G/DL (ref 31.5–36.5)
MCV RBC AUTO: 84 FL (ref 78–100)
MONOCYTES # BLD AUTO: 0.6 10E3/UL (ref 0–1.3)
MONOCYTES NFR BLD AUTO: 6 %
NEUTROPHILS # BLD AUTO: 7.8 10E3/UL (ref 1.6–8.3)
NEUTROPHILS NFR BLD AUTO: 75 %
NRBC # BLD AUTO: 0 10E3/UL
NRBC BLD AUTO-RTO: 0 /100
PLATELET # BLD AUTO: 283 10E3/UL (ref 150–450)
POTASSIUM BLD-SCNC: 3.7 MMOL/L (ref 3.5–5)
POTASSIUM BLD-SCNC: 4.5 MMOL/L (ref 3.5–5)
PROT SERPL-MCNC: 7 G/DL (ref 6–8)
RBC # BLD AUTO: 4.33 10E6/UL (ref 3.8–5.2)
SARS-COV-2 RNA RESP QL NAA+PROBE: NEGATIVE
SODIUM SERPL-SCNC: 141 MMOL/L (ref 136–145)
SODIUM SERPL-SCNC: 143 MMOL/L (ref 136–145)
TROPONIN I SERPL-MCNC: 0.01 NG/ML (ref 0–0.29)
TROPONIN I SERPL-MCNC: <0.01 NG/ML (ref 0–0.29)
WBC # BLD AUTO: 10.3 10E3/UL (ref 4–11)

## 2022-04-07 PROCEDURE — 82962 GLUCOSE BLOOD TEST: CPT | Mod: 91

## 2022-04-07 PROCEDURE — 82550 ASSAY OF CK (CPK): CPT | Performed by: INTERNAL MEDICINE

## 2022-04-07 PROCEDURE — C9803 HOPD COVID-19 SPEC COLLECT: HCPCS

## 2022-04-07 PROCEDURE — P9604 ONE-WAY ALLOW PRORATED TRIP: HCPCS | Performed by: INTERNAL MEDICINE

## 2022-04-07 PROCEDURE — 96375 TX/PRO/DX INJ NEW DRUG ADDON: CPT | Mod: XU

## 2022-04-07 PROCEDURE — 258N000003 HC RX IP 258 OP 636: Performed by: EMERGENCY MEDICINE

## 2022-04-07 PROCEDURE — 36415 COLL VENOUS BLD VENIPUNCTURE: CPT | Performed by: INTERNAL MEDICINE

## 2022-04-07 PROCEDURE — 73130 X-RAY EXAM OF HAND: CPT | Mod: LT

## 2022-04-07 PROCEDURE — 250N000011 HC RX IP 250 OP 636: Performed by: INTERNAL MEDICINE

## 2022-04-07 PROCEDURE — 73080 X-RAY EXAM OF ELBOW: CPT | Mod: LT

## 2022-04-07 PROCEDURE — 85025 COMPLETE CBC W/AUTO DIFF WBC: CPT | Performed by: EMERGENCY MEDICINE

## 2022-04-07 PROCEDURE — 99285 EMERGENCY DEPT VISIT HI MDM: CPT | Mod: 25

## 2022-04-07 PROCEDURE — 80053 COMPREHEN METABOLIC PANEL: CPT | Performed by: EMERGENCY MEDICINE

## 2022-04-07 PROCEDURE — 250N000013 HC RX MED GY IP 250 OP 250 PS 637: Performed by: INTERNAL MEDICINE

## 2022-04-07 PROCEDURE — 87635 SARS-COV-2 COVID-19 AMP PRB: CPT | Performed by: EMERGENCY MEDICINE

## 2022-04-07 PROCEDURE — 93005 ELECTROCARDIOGRAM TRACING: CPT | Performed by: EMERGENCY MEDICINE

## 2022-04-07 PROCEDURE — 99220 PR INITIAL OBSERVATION CARE,LEVEL III: CPT | Performed by: INTERNAL MEDICINE

## 2022-04-07 PROCEDURE — G0378 HOSPITAL OBSERVATION PER HR: HCPCS

## 2022-04-07 PROCEDURE — 84484 ASSAY OF TROPONIN QUANT: CPT | Performed by: INTERNAL MEDICINE

## 2022-04-07 PROCEDURE — 71045 X-RAY EXAM CHEST 1 VIEW: CPT

## 2022-04-07 PROCEDURE — 80048 BASIC METABOLIC PNL TOTAL CA: CPT | Performed by: INTERNAL MEDICINE

## 2022-04-07 PROCEDURE — 36415 COLL VENOUS BLD VENIPUNCTURE: CPT | Performed by: EMERGENCY MEDICINE

## 2022-04-07 PROCEDURE — 73030 X-RAY EXAM OF SHOULDER: CPT | Mod: RT

## 2022-04-07 PROCEDURE — 250N000011 HC RX IP 250 OP 636: Performed by: EMERGENCY MEDICINE

## 2022-04-07 PROCEDURE — 84484 ASSAY OF TROPONIN QUANT: CPT | Performed by: EMERGENCY MEDICINE

## 2022-04-07 PROCEDURE — 83735 ASSAY OF MAGNESIUM: CPT | Performed by: INTERNAL MEDICINE

## 2022-04-07 PROCEDURE — 96361 HYDRATE IV INFUSION ADD-ON: CPT | Mod: XU

## 2022-04-07 PROCEDURE — 83880 ASSAY OF NATRIURETIC PEPTIDE: CPT | Performed by: EMERGENCY MEDICINE

## 2022-04-07 PROCEDURE — 96374 THER/PROPH/DIAG INJ IV PUSH: CPT

## 2022-04-07 RX ORDER — KETOROLAC TROMETHAMINE 15 MG/ML
15 INJECTION, SOLUTION INTRAMUSCULAR; INTRAVENOUS ONCE
Status: COMPLETED | OUTPATIENT
Start: 2022-04-07 | End: 2022-04-07

## 2022-04-07 RX ORDER — OXYCODONE AND ACETAMINOPHEN 5; 325 MG/1; MG/1
1 TABLET ORAL EVERY 6 HOURS PRN
Status: DISCONTINUED | OUTPATIENT
Start: 2022-04-07 | End: 2022-04-10

## 2022-04-07 RX ORDER — LAMOTRIGINE 100 MG/1
100 TABLET ORAL 2 TIMES DAILY
Status: DISCONTINUED | OUTPATIENT
Start: 2022-04-07 | End: 2022-04-15 | Stop reason: HOSPADM

## 2022-04-07 RX ORDER — CAPSAICIN 0.025 %
CREAM (GRAM) TOPICAL 3 TIMES DAILY
Status: DISCONTINUED | OUTPATIENT
Start: 2022-04-07 | End: 2022-04-11

## 2022-04-07 RX ORDER — PANTOPRAZOLE SODIUM 20 MG/1
40 TABLET, DELAYED RELEASE ORAL DAILY
Status: DISCONTINUED | OUTPATIENT
Start: 2022-04-08 | End: 2022-04-15 | Stop reason: HOSPADM

## 2022-04-07 RX ORDER — METHOCARBAMOL 500 MG/1
500 TABLET, FILM COATED ORAL 4 TIMES DAILY
Status: DISCONTINUED | OUTPATIENT
Start: 2022-04-07 | End: 2022-04-10

## 2022-04-07 RX ORDER — FUROSEMIDE 10 MG/ML
40 INJECTION INTRAMUSCULAR; INTRAVENOUS ONCE
Status: COMPLETED | OUTPATIENT
Start: 2022-04-07 | End: 2022-04-07

## 2022-04-07 RX ORDER — FUROSEMIDE 20 MG
20 TABLET ORAL DAILY
Status: DISCONTINUED | OUTPATIENT
Start: 2022-04-08 | End: 2022-04-09

## 2022-04-07 RX ORDER — SENNOSIDES 8.6 MG
2 TABLET ORAL 2 TIMES DAILY
Status: DISCONTINUED | OUTPATIENT
Start: 2022-04-07 | End: 2022-04-09

## 2022-04-07 RX ORDER — SODIUM CHLORIDE 9 MG/ML
INJECTION, SOLUTION INTRAVENOUS CONTINUOUS
Status: DISCONTINUED | OUTPATIENT
Start: 2022-04-07 | End: 2022-04-08

## 2022-04-07 RX ORDER — CARVEDILOL 3.12 MG/1
3.12 TABLET ORAL 2 TIMES DAILY WITH MEALS
Status: DISCONTINUED | OUTPATIENT
Start: 2022-04-07 | End: 2022-04-13

## 2022-04-07 RX ORDER — MELOXICAM 7.5 MG/1
15 TABLET ORAL DAILY
Status: DISCONTINUED | OUTPATIENT
Start: 2022-04-07 | End: 2022-04-15

## 2022-04-07 RX ORDER — NICOTINE POLACRILEX 4 MG
15-30 LOZENGE BUCCAL
Status: DISCONTINUED | OUTPATIENT
Start: 2022-04-07 | End: 2022-04-15 | Stop reason: HOSPADM

## 2022-04-07 RX ORDER — AMLODIPINE BESYLATE 5 MG/1
10 TABLET ORAL DAILY
Status: DISCONTINUED | OUTPATIENT
Start: 2022-04-08 | End: 2022-04-13

## 2022-04-07 RX ORDER — MIRABEGRON 25 MG/1
25 TABLET, FILM COATED, EXTENDED RELEASE ORAL AT BEDTIME
Status: DISCONTINUED | OUTPATIENT
Start: 2022-04-07 | End: 2022-04-15 | Stop reason: HOSPADM

## 2022-04-07 RX ORDER — ONDANSETRON 2 MG/ML
4 INJECTION INTRAMUSCULAR; INTRAVENOUS EVERY 6 HOURS PRN
Status: DISCONTINUED | OUTPATIENT
Start: 2022-04-07 | End: 2022-04-15 | Stop reason: HOSPADM

## 2022-04-07 RX ORDER — DEXTROSE MONOHYDRATE 25 G/50ML
25-50 INJECTION, SOLUTION INTRAVENOUS
Status: DISCONTINUED | OUTPATIENT
Start: 2022-04-07 | End: 2022-04-15 | Stop reason: HOSPADM

## 2022-04-07 RX ORDER — MAGNESIUM 200 MG
1000 TABLET ORAL DAILY
Status: DISCONTINUED | OUTPATIENT
Start: 2022-04-08 | End: 2022-04-15 | Stop reason: HOSPADM

## 2022-04-07 RX ORDER — POLYETHYLENE GLYCOL 3350 17 G/17G
17 POWDER, FOR SOLUTION ORAL DAILY
Status: DISCONTINUED | OUTPATIENT
Start: 2022-04-08 | End: 2022-04-15

## 2022-04-07 RX ORDER — DULOXETIN HYDROCHLORIDE 30 MG/1
60 CAPSULE, DELAYED RELEASE ORAL DAILY
Status: DISCONTINUED | OUTPATIENT
Start: 2022-04-08 | End: 2022-04-15

## 2022-04-07 RX ORDER — ACETAMINOPHEN 325 MG/1
325-650 TABLET ORAL EVERY 6 HOURS PRN
Status: DISCONTINUED | OUTPATIENT
Start: 2022-04-07 | End: 2022-04-10

## 2022-04-07 RX ORDER — GABAPENTIN 100 MG/1
200 CAPSULE ORAL 2 TIMES DAILY
Status: DISCONTINUED | OUTPATIENT
Start: 2022-04-07 | End: 2022-04-09

## 2022-04-07 RX ORDER — LISINOPRIL 5 MG/1
5 TABLET ORAL DAILY
Status: DISCONTINUED | OUTPATIENT
Start: 2022-04-08 | End: 2022-04-15 | Stop reason: HOSPADM

## 2022-04-07 RX ORDER — OXYCODONE AND ACETAMINOPHEN 5; 325 MG/1; MG/1
1.5 TABLET ORAL EVERY 4 HOURS PRN
Status: ON HOLD | COMMUNITY
End: 2022-04-12

## 2022-04-07 RX ORDER — ATORVASTATIN CALCIUM 10 MG/1
10 TABLET, FILM COATED ORAL AT BEDTIME
Status: DISCONTINUED | OUTPATIENT
Start: 2022-04-07 | End: 2022-04-15 | Stop reason: HOSPADM

## 2022-04-07 RX ORDER — SENNOSIDES A AND B 8.6 MG/1
2 TABLET, FILM COATED ORAL 2 TIMES DAILY PRN
Status: ON HOLD | COMMUNITY
End: 2022-04-24

## 2022-04-07 RX ORDER — TRIAMTERENE/HYDROCHLOROTHIAZID 37.5-25 MG
1 TABLET ORAL DAILY
Status: DISCONTINUED | OUTPATIENT
Start: 2022-04-08 | End: 2022-04-12

## 2022-04-07 RX ORDER — ONDANSETRON 4 MG/1
4 TABLET, ORALLY DISINTEGRATING ORAL EVERY 6 HOURS PRN
Status: DISCONTINUED | OUTPATIENT
Start: 2022-04-07 | End: 2022-04-15 | Stop reason: HOSPADM

## 2022-04-07 RX ORDER — CAPSAICIN, MENTHOL .025; 1.25 G/100G; G/100G
2 PATCH TOPICAL DAILY
Status: ON HOLD | COMMUNITY
End: 2022-04-12

## 2022-04-07 RX ORDER — GLIPIZIDE 10 MG/1
10 TABLET ORAL
Status: DISCONTINUED | OUTPATIENT
Start: 2022-04-07 | End: 2022-04-13

## 2022-04-07 RX ADMIN — ATORVASTATIN CALCIUM 10 MG: 10 TABLET, FILM COATED ORAL at 22:15

## 2022-04-07 RX ADMIN — SODIUM CHLORIDE: 9 INJECTION, SOLUTION INTRAVENOUS at 19:24

## 2022-04-07 RX ADMIN — SODIUM CHLORIDE 1000 ML: 9 INJECTION, SOLUTION INTRAVENOUS at 16:59

## 2022-04-07 RX ADMIN — GLIPIZIDE 10 MG: 10 TABLET ORAL at 19:21

## 2022-04-07 RX ADMIN — SODIUM CHLORIDE: 9 INJECTION, SOLUTION INTRAVENOUS at 22:13

## 2022-04-07 RX ADMIN — SENNOSIDES 2 TABLET: 8.6 TABLET ORAL at 19:29

## 2022-04-07 RX ADMIN — CARVEDILOL 3.12 MG: 3.12 TABLET, FILM COATED ORAL at 19:20

## 2022-04-07 RX ADMIN — GABAPENTIN 200 MG: 100 CAPSULE ORAL at 19:21

## 2022-04-07 RX ADMIN — MIRABEGRON 25 MG: 25 TABLET, FILM COATED, EXTENDED RELEASE ORAL at 22:19

## 2022-04-07 RX ADMIN — MELOXICAM 15 MG: 7.5 TABLET ORAL at 22:15

## 2022-04-07 RX ADMIN — KETOROLAC TROMETHAMINE 15 MG: 15 INJECTION, SOLUTION INTRAMUSCULAR; INTRAVENOUS at 15:06

## 2022-04-07 RX ADMIN — CAPSAICIN: 0.25 CREAM TOPICAL at 22:18

## 2022-04-07 RX ADMIN — METHOCARBAMOL 500 MG: 500 TABLET ORAL at 19:22

## 2022-04-07 RX ADMIN — LAMOTRIGINE 100 MG: 100 TABLET ORAL at 19:24

## 2022-04-07 RX ADMIN — MICONAZOLE NITRATE: 20 POWDER TOPICAL at 19:23

## 2022-04-07 RX ADMIN — METFORMIN HYDROCHLORIDE 500 MG: 500 TABLET, FILM COATED ORAL at 19:21

## 2022-04-07 RX ADMIN — FUROSEMIDE 40 MG: 10 INJECTION, SOLUTION INTRAMUSCULAR; INTRAVENOUS at 19:20

## 2022-04-07 ASSESSMENT — ACTIVITIES OF DAILY LIVING (ADL): DEPENDENT_IADLS:: CLEANING;COOKING;LAUNDRY;SHOPPING;MEAL PREPARATION;MEDICATION MANAGEMENT;TRANSPORTATION

## 2022-04-07 NOTE — ED NOTES
Patient arrived via EMS from nursing home with c/o of pain caused by a fall that occurred 2 days ago. LUE pain. Staff is concerned she may have a UTI. Pt has a history of spinal stenosis, osteoarthritis, and diabetes. Patient is hard of hearing. Hearing aide in [place].

## 2022-04-07 NOTE — ED PROVIDER NOTES
EMERGENCY DEPARTMENT ENCOUNTER      NAME: Tonya Yang  AGE: 81 year old female  YOB: 1940  MRN: [id number]  EVALUATION DATE & TIME: 2022 12:07 PM    PCP: Ananya Mclean    ED PROVIDER: Terry Shaffer M.D.      Chief Complaint   Patient presents with     Fall     possible UTI         FINAL IMPRESSION:  1. Weakness    2. Fall, initial encounter          ED COURSE & MEDICAL DECISION MAKIN:30 PM I met the patient and performed my initial interview and exam.   3:51 PM I rechecked and updated the patient on results. Discussed likely admission for placement.     Pertinent Labs & Imaging studies reviewed. (See chart for details)  81 year old female presents to the Emergency Department for evaluation of fall. Patient appears non toxic with stable vitals signs, patient is afebrile with no tachycardia or hypoxia.  Lungs are clear, abdomen is benign.  Review the medical record shows recent discharge on 3/13/2022 for pneumonia and severe sepsis.  Patient has been at TCU since and states that she has progressive weakness.  Considered new pneumonia, CHF, considered but less likely ACS, UTI, with slight abnormality or anemia.  She endorses recent fall hitting her left elbow and shoulder but denies hitting her head or neck.  She denies any head or neck pain.  Abdomen is benign with no focal tenderness and she is able to range her extremities well.  Nothing to suggest intrathoracic, intra-abdominal, nothing to suggest depressed skull fracture or cervical fracture or dislocation.  We will obtain plain films of the extremities, screening labs, chest x-ray and urine studies.  Patient and son complaining about her TCU and so I will have them be evaluated by our care manager.    Reassessment: Labs showed no acute concerning findings, troponin negative and ECG nonischemic.  Patient has no elevated white blood cell count, no fever.  Plain films reported no acute concerning findings.  Chest x-ray  "reported atelectasis versus consolidation, clinically I do not suspect pneumonia as again she denies any new cough, she has been afebrile here with no elevated white blood cell count.  Care manager spoke with the patient's son and at this time he is recommending admission for observation and then likely new TCU placement tomorrow.  Repeat exam is benign I discussed options for admission and both patient and son are agreeable to this.  COVID and urine studies pending at this time.  Patient case discussed with the admitting service.    At the conclusion of the encounter I discussed the results of all of the tests and the disposition. The questions were answered and return precautions provided. The patient or family acknowledged understanding and was agreeable with the care plan.         MEDICATIONS GIVEN IN THE EMERGENCY:  Medications   0.9% sodium chloride BOLUS (has no administration in time range)     Followed by   sodium chloride 0.9% infusion (has no administration in time range)   ketorolac (TORADOL) injection 15 mg (15 mg Intravenous Given 4/7/22 1506)       NEW PRESCRIPTIONS STARTED AT TODAY'S ER VISIT  New Prescriptions    No medications on file            =================================================================    HPI    Patient information was obtained from: patient, patient's son    Use of Intrepreter: N/A         Tonya Yang is a 81 year old female with pertinent history of HTN, HLD, DM type II, chronic pain, Meniere's disease, anxiety, who presents for evaluation of fall.    Patient reports she has been in a TCU for 5 weeks following hospitalization for spinal stenosis and pneumonia. Since then, she has \"gone downhill.\" Swelling increased in bilateral legs, with associated pain. On 4/5 (2 days ago) she fell while walking to the bathroom with her walker because staff did not assist her. Denies hitting head/neck or LOC. Since the fall, she has noted pain to left fingers, left elbow pain, and " right upper arm bruising and pain. Patient and son were not happy with care at current TCU so have decided to switch facilities. Son wanted patient to be evaluated in ED prior to moving.     REVIEW OF SYSTEMS   Constitutional:  Denies fever, chills  Respiratory:  Denies productive cough or increased work of breathing  Cardiovascular: Positive for bilateral leg swelling. Denies chest pain, palpitations  GI:  Denies abdominal pain, nausea, vomiting, or change in bowel or bladder habits   Musculoskeletal: Positive for bilateral leg pain, left elbow pain, right upper arm pain, pain to left fingers  Skin: Positive for bruising (right upper arm).  Denies rash   Neurologic:  Denies focal weakness, LOC  All systems negative except as marked.     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     Hepatic abscess      Hyperlipidemia      Hypertension      Irritable bowel syndrome      Meniere disease      Neuropathy      Noninfectious ileitis      Peritoneal abscess (H)      Spinal stenosis of lumbar region      Steroid long-term use      Vaginitis, atrophic, postmenopausal      Vitamin D deficiency        PAST SURGICAL HISTORY:  Past Surgical History:   Procedure Laterality Date     CATARACT IOL, RT/LT Left 7/2013     FOOT SURGERY      toe     GI SURGERY       IR LUMBAR/SACRAL TRANSFOR INJ BILATERAL Bilateral 3/11/2022     LAPAROSCOPIC HEPATECTOMY PARTIAL  11/4/2013    Procedure: LAPAROSCOPIC HEPATECTOMY PARTIAL;  Laparoscopic Debridement of Liver Abcess;  Surgeon: Sepideh Green MD;  Location: [facility] OR     ORTHOPEDIC SURGERY        PICC INSERTION  8/28/2013    5fr DL Power PICC, 41cm (1cm external length) in the R lateral brachial vein with tip in the SVC RA junction.     RECTAL SURGERY      1970s     VASCULAR SURGERY           CURRENT MEDICATIONS:    Prior to Admission medications    Medication Sig Start Date End Date Taking? Authorizing Provider   acetaminophen (TYLENOL) 325 MG tablet Take 1-2 tablets (325-650 mg) by mouth every 6 hours as needed for mild pain 3/12/22   Venita Cano APRN CNP   amLODIPine (NORVASC) 10 MG tablet Take 1 tablet (10 mg) by mouth daily 3/13/22   Hayder Mishra MD   atorvastatin (LIPITOR) 10 MG tablet Take 10 mg by mouth At Bedtime    Unknown, Entered By History   blood glucose (ACCU-CHEK FASTCLIX) lancing device FOR TESTING ONCE DAILY. DX  E11.9 TYPE 2 DIABETES 9/2/20   Reported, Patient   blood glucose (CONTOUR TEST) test strip TESTING EVERY DAY DX  E11.9 11/23/20   Reported, Patient   carvedilol (COREG) 3.125 MG tablet Take 1 tablet (3.125 mg) by mouth 2 times daily (with meals) 3/12/22   Hayder Mishra MD   CONTOUR NEXT TEST test strip TESTING EVERY DAY DX  E11.9 6/24/21   Reported, Patient   cyanocobalamin 1000 MCG SUBL Place 1,000 mcg under the tongue daily    Unknown, Entered By History   diazepam (VALIUM) 1 MG/ML solution Take 0.5 mLs (0.5 mg) by mouth 2 times daily as needed for agitation, anxiety or muscle spasms  Patient taking differently: Take 0.5 mg by mouth every 12 hours  3/12/22   Hayder Mishra MD   DULoxetine (CYMBALTA) 60 MG EC capsule TAKE 1 CAPSULE (60 MG) BY MOUTH DAILY 11/17/17   Bethanie Finnegan MD   fluconazole (DIFLUCAN) 150 MG tablet Take 150 mg by mouth See Admin Instructions One dose prn yeast infection     Unknown, Entered By History   furosemide (LASIX) 20 MG tablet Take 20 mg by mouth daily 4/4/22   Osmin Omalley APRN CNP   gabapentin (NEURONTIN) 100 MG capsule Take 2 capsules (200 mg) by mouth 2 times daily 3/12/22   Tad  Hayder Vang MD   glipiZIDE (GLUCOTROL) 10 MG tablet Take 1 tablet (10 mg) by mouth 2 times daily (before meals) 3/12/22   Hayder Mishra MD   guaiFENesin-dextromethorphan (ROBITUSSIN DM) 100-10 MG/5ML syrup Take 10 mLs by mouth every 4 hours as needed for cough or congestion (productive cough) 3/12/22   Hayder Mishra MD   JARDIANCE 25 MG TABS tablet Take 25 mg by mouth daily  7/6/21   Reported, Patient   lamoTRIgine (LAMICTAL) 100 MG tablet Take 100 mg by mouth 2 times daily    Unknown, Entered By History   lidocaine (LMX4) 4 % external cream Apply topically 4 times daily as needed for mild pain or moderate pain Apply to painful areas, avoid red or open skin, avoid heat over ointment 3/12/22   Venita Cano APRN CNP   lisinopril (ZESTRIL) 5 MG tablet Take 5 mg by mouth daily 4/1/22   Osmin Omalley APRN CNP   meloxicam (MOBIC) 15 MG tablet Take 15 mg by mouth daily 4/1/22   Osmin Omalley APRN CNP   metFORMIN (GLUCOPHAGE) 500 MG tablet Take 0.5 tablets (250 mg) by mouth 2 times daily (with meals)  Patient taking differently: Take 500 mg by mouth 2 times daily (with meals)  7/26/21   Aron Bolanos MD   methocarbamol (ROBAXIN) 500 MG tablet Take 500 mg by mouth 4 times daily    Reported, Patient   metoclopramide (REGLAN) 10 MG tablet Take 10 mg by mouth 3 times daily as needed Before meals as needed.    Unknown, Entered By History   mirabegron (MYRBETRIQ) 25 MG 24 hr tablet Take 25 mg by mouth daily 4/4/22   Osmin Omalley APRN CNP   nystatin (MYCOSTATIN) 313961 UNIT/GM external powder Apply topically 2 times daily as needed Breast rash 5/31/21   Reported, Patient   omeprazole (PRILOSEC) 20 MG DR capsule Take 20 mg by mouth daily     Unknown, Entered By History   order for DME Equipment being ordered: wrist brace 5/31/20   Parenteau, Leah Alejandrina, PA-C   oxyCODONE-acetaminophen (PERCOCET) 5-325 MG tablet Take 1.5 tablets by mouth every 8 hours Plus 1.5 tablets  every 6 hours as needed 22   Osmin Omalley, APRN CNP   predniSONE (DELTASONE) 1 MG tablet Take 6 mg by mouth daily 1mg tab and 5mg tab    Unknown, Entered By History   triamcinolone (KENALOG) 0.1 % external cream Apply topically 2 times daily as needed     Reported, Patient   triamterene-HCTZ (MAXZIDE-25) 37.5-25 MG tablet Take 1 tablet by mouth daily    Unknown, Entered By History   vitamin D3 (CHOLECALCIFEROL) 50 mcg (2000 units) tablet Take 3 tablets by mouth daily    Unknown, Entered By History        ALLERGIES:  Allergies   Allergen Reactions     Propofol Nausea and Vomiting     Shellfish Allergy      Other reaction(s): Dizziness       FAMILY HISTORY:  Family History   Problem Relation Age of Onset     Cerebrovascular Disease Mother      Heart Disease Mother      Heart Disease Father      Heart Disease Brother      Diabetes No family hx of      Coronary Artery Disease No family hx of      Hypertension No family hx of      Hyperlipidemia No family hx of      Breast Cancer No family hx of      Colon Cancer No family hx of      Prostate Cancer No family hx of      Other Cancer No family hx of      Depression No family hx of      Anxiety Disorder No family hx of      Mental Illness No family hx of      Substance Abuse No family hx of      Anesthesia Reaction No family hx of      Asthma No family hx of      Osteoporosis No family hx of      Genetic Disorder No family hx of      Thyroid Disease No family hx of      Obesity No family hx of      Unknown/Adopted No family hx of        SOCIAL HISTORY:   Social History     Socioeconomic History     Marital status:      Spouse name: Not on file     Number of children: Not on file     Years of education: Not on file     Highest education level: Not on file   Occupational History     Not on file   Tobacco Use     Smoking status: Former Smoker     Types: Cigarettes     Quit date: 11/15/1987     Years since quittin.4     Smokeless tobacco: Former  "User   Substance and Sexual Activity     Alcohol use: Not Currently     Alcohol/week: 0.0 standard drinks     Comment: MALISSA white since 9/131986     Drug use: No     Comment: used marijuanna in the past     Sexual activity: Never     Partners: Male   Other Topics Concern     Parent/sibling w/ CABG, MI or angioplasty before 65F 55M? No   Social History Narrative     Not on file     Social Determinants of Health     Financial Resource Strain: Not on file   Food Insecurity: Not on file   Transportation Needs: Not on file   Physical Activity: Not on file   Stress: Not on file   Social Connections: Not on file   Intimate Partner Violence: Not on file   Housing Stability: Not on file       VITALS:  Patient Vitals for the past 24 hrs:   BP Temp Temp src Pulse Resp SpO2 Height Weight   04/07/22 1459 -- -- -- -- 16 -- -- --   04/07/22 1245 129/58 -- -- 82 -- 96 % -- --   04/07/22 1225 124/65 97.6  F (36.4  C) Oral -- -- 93 % 1.651 m (5' 5\") 95.3 kg (210 lb)        PHYSICAL EXAM    Constitutional:  Awake, alert, in no apparent distress  HENT:  Normocephalic, Atraumatic with no scalp hematomas or skull depressions, no signs of basilar skull injury, Bilateral external ears normal with no blood behind the TMs, Oropharynx moist with no signs of acute dental trauma, Nose normal with no septal hematoma. Neck- Normal range of motion with no guarding, No midline cervical tenderness, Supple, No stridor.   Eyes:  PERRL, EOMI with no signs of entrapment, Conjunctiva normal, No discharge.   Respiratory:  Normal breath sounds, No respiratory distress, No wheezing.  No signs of flail chest  Cardiovascular:  Normal heart rate, Normal rhythm, No appreciable rubs or gallops.   GI:  Soft, No tenderness, No distension, No palpable masses  Musculoskeletal:  Intact distal pulses, No edema. No major deformities noted.  Back-tender along cervical spine which patient states is unchanged from prior, no step-offs or signs of trauma.  Pelvis is " stable.  Integument:  Warm, Dry, ecchymosis to the posterior right shoulder  Neurologic:  Alert & oriented, Normal motor function, Normal sensory function, No focal deficits noted.   Psychiatric:  Affect normal, Judgment normal, Mood normal.     LAB:  All pertinent labs reviewed and interpreted.  Results for orders placed or performed during the hospital encounter of 04/07/22   XR Hand Left 3 Views    Impression    IMPRESSION: No fracture or dislocation. Moderate degenerative changes at the MCP joint of the thumb.   XR Shoulder Right 3 Views    Impression    IMPRESSION: Normal joint spaces and alignment. No fracture. Small loose body in the axillary recess.   XR Chest 1 View    Impression    IMPRESSION: The lungs are hypoinflated with patchy bibasilar atelectasis and/or consolidation. No effusions or pneumothorax. Heart size is accentuated by technique and low lung volumes. Mild pulmonary vascular congestion without overt pulmonary edema. No   acute osseous findings.   XR Elbow Left G/E 3 Views    Impression    IMPRESSION: Normal joint spaces and alignment. No fracture or joint effusion.   Comprehensive metabolic panel   Result Value Ref Range    Sodium 141 136 - 145 mmol/L    Potassium 4.5 3.5 - 5.0 mmol/L    Chloride 99 98 - 107 mmol/L    Carbon Dioxide (CO2) 29 22 - 31 mmol/L    Anion Gap 13 5 - 18 mmol/L    Urea Nitrogen 21 8 - 28 mg/dL    Creatinine 1.00 0.60 - 1.10 mg/dL    Calcium 10.0 8.5 - 10.5 mg/dL    Glucose 225 (H) 70 - 125 mg/dL    Alkaline Phosphatase 85 45 - 120 U/L    AST 19 0 - 40 U/L    ALT 19 0 - 45 U/L    Protein Total 7.0 6.0 - 8.0 g/dL    Albumin 3.6 3.5 - 5.0 g/dL    Bilirubin Total 0.4 0.0 - 1.0 mg/dL    GFR Estimate 57 (L) >60 mL/min/1.73m2   Result Value Ref Range    Troponin I <0.01 0.00 - 0.29 ng/mL   B-Type Natriuretic Peptide ([facility] Only)   Result Value Ref Range    BNP 17 0 - 151 pg/mL   CBC with platelets and differential   Result Value Ref Range    WBC Count 10.3 4.0 - 11.0  10e3/uL    RBC Count 4.33 3.80 - 5.20 10e6/uL    Hemoglobin 9.7 (L) 11.7 - 15.7 g/dL    Hematocrit 36.2 35.0 - 47.0 %    MCV 84 78 - 100 fL    MCH 22.4 (L) 26.5 - 33.0 pg    MCHC 26.8 (L) 31.5 - 36.5 g/dL    RDW 16.8 (H) 10.0 - 15.0 %    Platelet Count 283 150 - 450 10e3/uL    % Neutrophils 75 %    % Lymphocytes 15 %    % Monocytes 6 %    % Eosinophils 3 %    % Basophils 1 %    % Immature Granulocytes 0 %    NRBCs per 100 WBC 0 <1 /100    Absolute Neutrophils 7.8 1.6 - 8.3 10e3/uL    Absolute Lymphocytes 1.5 0.8 - 5.3 10e3/uL    Absolute Monocytes 0.6 0.0 - 1.3 10e3/uL    Absolute Eosinophils 0.3 0.0 - 0.7 10e3/uL    Absolute Basophils 0.1 0.0 - 0.2 10e3/uL    Absolute Immature Granulocytes 0.0 <=0.4 10e3/uL    Absolute NRBCs 0.0 10e3/uL       RADIOLOGY:  XR Elbow Left G/E 3 Views   Final Result   IMPRESSION: Normal joint spaces and alignment. No fracture or joint effusion.      XR Chest 1 View   Final Result   IMPRESSION: The lungs are hypoinflated with patchy bibasilar atelectasis and/or consolidation. No effusions or pneumothorax. Heart size is accentuated by technique and low lung volumes. Mild pulmonary vascular congestion without overt pulmonary edema. No    acute osseous findings.      XR Hand Left 3 Views   Final Result   IMPRESSION: No fracture or dislocation. Moderate degenerative changes at the MCP joint of the thumb.      XR Shoulder Right 3 Views   Final Result   IMPRESSION: Normal joint spaces and alignment. No fracture. Small loose body in the axillary recess.             EKG:    Sinus rhythm, right bundle branch block, no specific ST acute ischemic changes, no concerning dysrhythmias or interval prolongation, compared to ECG of September 7, 2021, no specific ST acute ischemic change appreciated  I have independently reviewed and interpreted the EKG(s) documented above.    PROCEDURES:       ISuni, am serving as a scribe to document services personally performed by Terry Shaffer MD,  based on my observation and the provider's statements to me. I, Terry Shaffer MD attest that Suni Gray is acting in a scribe capacity, has observed my performance of the services and has documented them in accordance with my direction.    Terry Shaffer M.D.  Emergency Medicine  [facility] EMERGENCY ROOM  [id number] [place] [address]  [phone]  Dept: [phone]     Terry Shaffer MD  04/07/22 4844

## 2022-04-07 NOTE — PHARMACY-ADMISSION MEDICATION HISTORY
Pharmacy Note - Admission Medication History    Pertinent Provider Information: info from [facility]     ______________________________________________________________________    Prior To Admission (PTA) med list completed and updated in EMR.       PTA Med List   Medication Sig Last Dose     acetaminophen (TYLENOL) 325 MG tablet Take 1-2 tablets (325-650 mg) by mouth every 6 hours as needed for mild pain PRN     amLODIPine (NORVASC) 10 MG tablet Take 1 tablet (10 mg) by mouth daily 4/6/2022 at 2000     atorvastatin (LIPITOR) 10 MG tablet Take 10 mg by mouth At Bedtime 4/6/2022 at Unknown time     Capsaicin-Menthol (SALONPAS PAIN REL GEL-PTCH HOT) 0.025-1.25 % PTCH Externally apply 2 patches topically daily Place 1 patch on each shoulder-- on in AM, off in PM 4/6/2022 at Unknown time     carvedilol (COREG) 3.125 MG tablet Take 1 tablet (3.125 mg) by mouth 2 times daily (with meals) 4/7/2022 at am     cyanocobalamin 1000 MCG SUBL Place 1,000 mcg under the tongue daily 4/7/2022 at am     diazepam (VALIUM) 1 MG/ML solution Take 0.5 mg by mouth every 12 hours 4/7/2022 at 0800     DULoxetine (CYMBALTA) 60 MG EC capsule TAKE 1 CAPSULE (60 MG) BY MOUTH DAILY 4/7/2022 at am     fluconazole (DIFLUCAN) 150 MG tablet Take 150 mg by mouth See Admin Instructions One dose prn yeast infection  Unknown at Unknown time     furosemide (LASIX) 20 MG tablet Take 20 mg by mouth daily 4/7/2022 at am     gabapentin (NEURONTIN) 100 MG capsule Take 2 capsules (200 mg) by mouth 2 times daily 4/7/2022 at am     glipiZIDE (GLUCOTROL) 10 MG tablet Take 1 tablet (10 mg) by mouth 2 times daily (before meals) 4/7/2022 at am     guaiFENesin-dextromethorphan (ROBITUSSIN DM) 100-10 MG/5ML syrup Take 10 mLs by mouth every 4 hours as needed for cough or congestion (productive cough) PRN     JARDIANCE 25 MG TABS tablet Take 25 mg by mouth daily  4/7/2022 at am     lamoTRIgine (LAMICTAL) 100 MG tablet Take 100 mg by mouth 2 times daily 4/7/2022 at  am     lisinopril (ZESTRIL) 5 MG tablet Take 5 mg by mouth daily 4/7/2022 at am     meloxicam (MOBIC) 7.5 MG tablet Take 15 mg by mouth daily  4/7/2022 at am     metFORMIN (GLUCOPHAGE) 500 MG tablet Take 500 mg by mouth 2 times daily (with meals) 4/7/2022 at am     methocarbamol (ROBAXIN) 500 MG tablet Take 500 mg by mouth 4 times daily 4/7/2022 at 0800     metoclopramide (REGLAN) 10 MG tablet Take 10 mg by mouth 3 times daily as needed Before meals as needed. PRN     mirabegron (MYRBETRIQ) 25 MG 24 hr tablet Take 25 mg by mouth At Bedtime  4/6/2022 at Unknown time     nystatin (MYCOSTATIN) 648435 UNIT/GM external powder Apply topically 2 times daily as needed Breast rash PRN     omeprazole (PRILOSEC) 20 MG DR capsule Take 20 mg by mouth daily  4/7/2022 at am     oxyCODONE-acetaminophen (PERCOCET) 5-325 MG tablet Take 1.5 tablets by mouth every 4 hours as needed for severe pain 4/6/2022 at Unknown time     oxyCODONE-acetaminophen (PERCOCET) 5-325 MG tablet Take 1.5 tablets by mouth every 8 hours  4/7/2022 at 0800     predniSONE (DELTASONE) 1 MG tablet Take 6 mg by mouth daily  4/7/2022 at am     senna (SENOKOT) 8.6 MG tablet Take 2 tablets by mouth 2 times daily as needed for constipation 4/6/2022 at Unknown time     triamcinolone (KENALOG) 0.1 % external cream Apply topically 2 times daily as needed  PRN     triamterene-HCTZ (MAXZIDE-25) 37.5-25 MG tablet Take 1 tablet by mouth daily 4/7/2022 at am     vitamin D3 (CHOLECALCIFEROL) 50 mcg (2000 units) tablet Take 3 tablets by mouth daily 4/7/2022 at Unknown time       Information source(s): Facility ([facility] medication list/MAR  Method of interview communication: N/A    Summary of Changes to PTA Med List  New: senna-s,  Salon pas  Discontinued: lidocaine  Changed: metformin re-entered    Patient was asked about OTC/herbal products specifically.  PTA med list reflects this.    In the past week, patient estimated taking medication this percent of the time:  greater  than 90%.    Allergies were reviewed, assessed, and updated with the patient.      Patient did not bring any medications to the hospital and can't retrieve from home. No multi-dose medications are available for use during hospital stay.     The information provided in this note is only as accurate as the sources available at the time of the update(s).    Thank you for the opportunity to participate in the care of this patient.    Leah Arias [facility]  4/7/2022 3:21 PM

## 2022-04-07 NOTE — H&P
Admission History & Physical  Tonya Yang, 1940, [id number]    [facility]  Ananya Mclean, [phone]    Assessment and Plan:  Generalized weakness   fall  PT OT eval  Urinalysis pending    Chronic lower extremity edema  We will give 1 dose of IV Lasix  Resume home diuretic starting tomorrow.    Constipation  Schedule bowel meds    Tinea groin  Miconazole powder    Chronic low back pain  Continue home medications  Hypertension  Resume antihypertensives starting tomorrow  Diabetes mellitus type 2  Continue oral hypoglycemics  Insulin sliding scale as needed  History of enlarged lymph node in chest  Follow-up as outpatient  Polypharmacy  Anxiety  GERD  DVT prophylaxis: Subcu heparin  Code: Full    Expected length of stay : anticipate hospitalization less than 2 days.     Chief Complaint: fall     HPI:     Tonya Yang is a 81 year old old female Past medical history significant for hypertension, diabetes mellitus type 2, neuropathy, polyarthralgia, chronic low back pain, severe spinal stenosis recently hospitalized at [facility] 3/7-3/13 for pneumonia, severe sepsis, acute on chronic low back pain received steroid injection discharged to TCU presented to the ED with complaints of a fall.  She complains about not receiving good care at the TCU.  reports 2 days ago she lost her balance and fell onto her left elbow.  Denies loss of consciousness or prodromal symptoms or head injury.  She complains of generalized pain.  No specific complaints such as fever chills shortness of breath, chest pain, worsening cough, urinary complaints.  She has chronic constipation, worsening lower extremity edema.  Also complains of groin rash and is being treated with antifungals.    In the ED her lab work-up essentially negative.  Chest x-ray question of atelectasis.  She is admitted under observation for placement at a different TCU.    Medical History  Past Medical History:   Diagnosis Date      Abdominal pain      Anxiety      Arthritis      Asthma      Bipolar 1 disorder (H)      Chronic pain      Cochlear hydrops 1988    steriods and diazide     COPD (chronic obstructive pulmonary disease) (H)      Depression      Diabetes mellitus (H)      Dyspepsia      GERD (gastroesophageal reflux disease)      Hard of hearing     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     Hepatic abscess      Hyperlipidemia      Hypertension      Irritable bowel syndrome      Meniere disease      Neuropathy      Noninfectious ileitis      Peritoneal abscess (H)      Spinal stenosis of lumbar region      Steroid long-term use      Vaginitis, atrophic, postmenopausal      Vitamin D deficiency       Patient Active Problem List    Diagnosis Date Noted     Weakness 04/07/2022     Priority: Medium     Fall, initial encounter 04/07/2022     Priority: Medium     Bilateral hearing loss, unspecified hearing loss type 03/24/2022     Priority: Medium     Spasm of back muscles 03/24/2022     Priority: Medium     Neural foraminal stenosis of cervical spine 03/24/2022     Priority: Medium     Multilevel spondylosis 03/24/2022     Priority: Medium     Anxiety and depression 03/24/2022     Priority: Medium     Slow transit constipation 03/24/2022     Priority: Medium     Paravertebral mass 03/13/2022     Priority: Medium     Abnormal finding on CT scan 03/13/2022     Priority: Medium     Pneumonia of right lower lobe due to infectious organism 03/07/2022     Priority: Medium     Severe sepsis (H) 03/07/2022     Priority: Medium     Type 2 diabetes mellitus with hyperglycemia (H) 03/07/2022     Priority: Medium     Glycosuria 03/07/2022     Priority: Medium     Confusion 07/20/2021     Priority: Medium     Hyperglycemia 07/20/2021     Priority: Medium     Acute renal failure, unspecified acute renal failure type (H) 07/20/2021     Priority: Medium     Bilateral deafness 01/23/2018     Priority: Medium     Class 2 obesity due to excess  calories with serious comorbidity and body mass index (BMI) of 37.0 to 37.9 in adult 10/24/2017     Priority: Medium     Type 2 diabetes mellitus with diabetic nephropathy, without long-term current use of insulin (H) 02/21/2017     Priority: Medium     Muscle weakness (generalized) worse since 11-16 w drowsiness 02/14/2017     Priority: Medium     Chronic pain syndrome-issue of broken controlled sub agreement  04/25/2016     Priority: Medium     Patient is followed by Bethanie Finnegan MD for ongoing prescription of pain medication.  All refills should be approved by this provider, or covering partner.    AGREEMENT BROKEN  By getting meds from an outside provider  See 4-2016 notes     Medication(s): oxycodone a 5mgm /day .   Maximum quantity per month: 30   Clinic visit frequency required: Q 3 months     Controlled substance agreement on file: Yes       Date(s): 9-23-15    Pain Clinic evaluation in the past: No    DIRE Total Score(s):  No flowsheet data found.    Last [facility] website verification:  done on 5-2-16   [email or web address]         Bilateral leg edema worsening w her increasing inactivity  04/25/2016     Priority: Medium     Sensorineural hearing loss, bilateral 04/19/2016     Priority: Medium     Long-term (current) use of anticoagulants [Z79.01] 03/22/2016     Priority: Medium     Essential hypertension 03/22/2016     Priority: Medium     Ankle edema 03/22/2016     Priority: Medium     Microalbuminuria 03/17/2016     Priority: Medium     Leukocytosis w Lt shift  03/17/2016     Priority: Medium     Localized edema-L>R lat malleolus 02/20/2016     Priority: Medium     Mixed hyperlipidemia 02/18/2016     Priority: Medium     Primary insomnia 12/03/2015     Priority: Medium     Bilateral back pain 11/12/2015     Priority: Medium     Left-sided low back pain with left-sided sciatica since 1993 w reoccurrence 6-15  11/03/2015     Priority: Medium     Meniere's disease (cochlear hydrops), LT  11/03/2015     Priority: Medium     Steroid dependent for cochlear hydrops  since 1985 11/03/2015     Priority: Medium     Lower urinary tract infectious disease 10/25/2015     Priority: Medium     Diagnosis updated by automated process. Provider to review and confirm.       Ambriz's cyst, left 07/17/2015     Priority: Medium     Diarrhea r/o exacerbated by metformin -since 30's 06/30/2015     Priority: Medium     Bipolar disorder, in full remission, most recent episode depressed (H) 06/04/2015     Priority: Medium     Temporomandibular joint disorder 12/01/2014     Priority: Medium     Problem list name updated by automated process. Provider to review       Elevated liver enzymes since 12-13 liver abscess I&D 01/16/2014     Priority: Medium     Abscess,& FB in liver in 4-10 & 4-11 w recurrent pain in RUQ  in 7-13 s/po lap I&D and unroofing sans finding of an FB  in 11-13 11/26/2013     Priority: Medium     Deafness 10/30/2013     Priority: Medium     Iron deficiency anemia due to chronic blood loss 10/03/2013     Priority: Medium     Health Care Home 08/30/2013     Priority: Medium     [facility]  Patient Care Plan  About Me  Patient Name:  Tonya Yang    YOB: 1940  Age:   72 year old   [facility] MRN: [id number] Telephone Information:     Home Phone [phone]   Mobile [phone]       Address:    [facility] [place]  [address] [address] Email address:  No e-mail address on record      Emergency Contact(s)  Name Relationship Lgl Grd Work Phone Home Phone Mobile Phone   1. MANJU YANG Son   [phone]            Primary language:  English     needed? No   [facility] [employer]:  [phone] op. 1  Other communication barriers: Hard of hearing  Preferred Method of Communication:  Phone  Current living arrangement: I live in a private home with family  Mobility Status/ Medical Equipment:    Other information to know about me:    Health  Maintenance  Immunizations:     Cancer Screening:     My Access Plan  Medical Emergency 911   Primary Clinic Line [phone]   24 Hour Appointment Line [phone] or  [phone]) (toll-free)   24 Hour Nurse Line [phone] (toll-free)   Preferred Urgent Care     Preferred Hospital [facility]   Preferred Pharmacy [id number] - [place], [place] - [address]     Behavioral Health Crisis Line Crisis Connection, [phone] or [phone]     My Care Team Members  Patient Care Team       Relationship Specialty Notifications Start End    Bethanie Finnegan MD PCP - General Family Practice  8/22/13     Phone: [phone] Fax: [phone]         [facility] [address] [facility] [address]    Valencia Sanchez RN [facility] Care Coordinator   8/30/13     Phone: [phone] Fax: [phone]             My Care Plans  Self Management and Treatment Plan  Presenting Concern Signs and Symptoms Goal/Action Plan   Home IV antibiotics x 8 weeks  [facility] - [phone]   Start Date:8/30/13 Active/ Initiated with: Date Reviewed:           Start Date: Active/Complete Initiated with: Date Reviewed:           Start Date: Active/Complete Initiated with: Date Reviewed:       Action Plans on File: None  Advance Care Plans/Directives on file:  No           My Medical and Care Information  Problem List   Patient Active Problem List   Diagnosis     Postmenopausal atrophic vaginitis     Steroid dependent     Bipolar disorder     Chronic diarrhea     Type 2 diabetes, HbA1C goal < 7%     Hyperlipidemia LDL goal <100     IBS (irritable bowel syndrome)     Major depression in complete remission     Obesity     Meniere's disease (cochlear hydrops)     GERD (gastroesophageal reflux disease)     Hypertension goal BP (blood pressure) < 130/80     CKD (chronic kidney disease) stage 3, GFR 30-59 ml/min     Tobacco abuse: [age] y/o @ 2ppd= 65pk yr hx      Obstructive lung disease :  moderate/spirometry w oximetry=91% on 7-18-13      Anemia     Abscess,& FB in liver in 4-10 & 4-11 w recurrent pain in RUQ  in 7-13      Hepatic abscess            Allergies   Allergies   Allergen Reactions     Propofol      Nausea and vomiting        Health Care Home Complexity Tier:      Care Coordination Start Date: 08/30/13   Frequency of Care Coordination: every 2 weeks   Form Last Updated: 08/30/2013            CKD (chronic kidney disease) stage 3, GFR 30-59 ml/min (H) 07/18/2013     Priority: Medium     IBS (irritable bowel syndrome) 06/20/2013     Priority: Medium     Meniere's disease (cochlear hydrops) - on prednisone and triamterene hydrochlorothiazide 06/20/2013     Priority: Medium     GERD (gastroesophageal reflux disease) 06/20/2013     Priority: Medium     Postmenopausal atrophic vaginitis 02/13/2013     Priority: Medium        Surgical History  She  has a past surgical history that includes Rectal surgery; Foot surgery; picc insertion (8/28/2013); GI surgery; orthopedic surgery; vascular surgery; Laparoscopic hepatectomy partial (11/4/2013); cataract iol, rt/lt (Left, 7/2013); and IR Lumbar Sacral Transfor Injection Bilateral (Bilateral, 3/11/2022).     Past Surgical History:   Procedure Laterality Date     CATARACT IOL, RT/LT Left 7/2013     FOOT SURGERY      toe     GI SURGERY       IR LUMBAR/SACRAL TRANSFOR INJ BILATERAL Bilateral 3/11/2022     LAPAROSCOPIC HEPATECTOMY PARTIAL  11/4/2013    Procedure: LAPAROSCOPIC HEPATECTOMY PARTIAL;  Laparoscopic Debridement of Liver Abcess;  Surgeon: Sepideh Green MD;  Location: [facility] OR     ORTHOPEDIC SURGERY       PICC INSERTION  8/28/2013    5fr DL Power PICC, 41cm (1cm external length) in the R lateral brachial vein with tip in the SVC RA junction.     RECTAL SURGERY      1970s     VASCULAR SURGERY         Allergies  Allergies   Allergen Reactions     Propofol Nausea and Vomiting     Shellfish Allergy      Other reaction(s): Dizziness  "      Prior to Admission Medications   (Not in a hospital admission)      Social History  Reviewed, and she  reports that she quit smoking about 34 years ago. Her smoking use included cigarettes. She has quit using smokeless tobacco. She reports previous alcohol use. She reports that she does not use drugs.  Social History     Tobacco Use     Smoking status: Former Smoker     Types: Cigarettes     Quit date: 11/15/1987     Years since quittin.4     Smokeless tobacco: Former User   Substance Use Topics     Alcohol use: Not Currently     Alcohol/week: 0.0 standard drinks     Comment: MALISSA white since        Family History  Reviewed, and I have reviewed this patient's family history and updated it with pertinent information if needed.  Family History   Problem Relation Age of Onset     Cerebrovascular Disease Mother      Heart Disease Mother      Heart Disease Father      Heart Disease Brother      Diabetes No family hx of      Coronary Artery Disease No family hx of      Hypertension No family hx of      Hyperlipidemia No family hx of      Breast Cancer No family hx of      Colon Cancer No family hx of      Prostate Cancer No family hx of      Other Cancer No family hx of      Depression No family hx of      Anxiety Disorder No family hx of      Mental Illness No family hx of      Substance Abuse No family hx of      Anesthesia Reaction No family hx of      Asthma No family hx of      Osteoporosis No family hx of      Genetic Disorder No family hx of      Thyroid Disease No family hx of      Obesity No family hx of      Unknown/Adopted No family hx of           Review Of Systems  Complete As per admission HPI, all other systems reviewed and negative.     Physical Exam:  Vital signs:  Temp: 97.6  F (36.4  C) Temp src: Oral BP: 129/58 Pulse: 82   Resp: 16 SpO2: 96 % O2 Device: None (Room air)   Height: 165.1 cm (5' 5\") Weight: 95.3 kg (210 lb)  Estimated body mass index is 34.95 kg/m  as calculated from the " "following:    Height as of this encounter: 1.651 m (5' 5\").    Weight as of this encounter: 95.3 kg (210 lb).    General Appearance:  Awake Alert, orientedx3, not in any apparent distress   Head:  Normocephalic, without obvious abnormality   Eyes:  PERRL, conjunctiva/corneas clear   Throat: Oral mucosa moist   Neck: Supple,  no JVD   Lungs:   Clear to auscultation bilaterally, respirations unlabored   Chest Wall:  No tenderness   Heart:  Regular rate and rhythm, S1, S2 normal,no murmur   Abdomen:   Soft, non-tender, bowel sounds present,  no guarding, rigidity    Extremities:   1+ pitting edema bilaterally no joint swelling   Skin: Skin color, texture, turgor normal, no rashes or lesions   Neurologic: Alert and oriented X 3, no focal deficits     Results:  Labs Ordered and Resulted from Time of ED Arrival to Time of ED Departure   COMPREHENSIVE METABOLIC PANEL - Abnormal       Result Value    Sodium 141      Potassium 4.5      Chloride 99      Carbon Dioxide (CO2) 29      Anion Gap 13      Urea Nitrogen 21      Creatinine 1.00      Calcium 10.0      Glucose 225 (*)     Alkaline Phosphatase 85      AST 19      ALT 19      Protein Total 7.0      Albumin 3.6      Bilirubin Total 0.4      GFR Estimate 57 (*)    CBC WITH PLATELETS AND DIFFERENTIAL - Abnormal    WBC Count 10.3      RBC Count 4.33      Hemoglobin 9.7 (*)     Hematocrit 36.2      MCV 84      MCH 22.4 (*)     MCHC 26.8 (*)     RDW 16.8 (*)     Platelet Count 283      % Neutrophils 75      % Lymphocytes 15      % Monocytes 6      % Eosinophils 3      % Basophils 1      % Immature Granulocytes 0      NRBCs per 100 WBC 0      Absolute Neutrophils 7.8      Absolute Lymphocytes 1.5      Absolute Monocytes 0.6      Absolute Eosinophils 0.3      Absolute Basophils 0.1      Absolute Immature Granulocytes 0.0      Absolute NRBCs 0.0     TROPONIN I - Normal    Troponin I <0.01     B-TYPE NATRIURETIC PEPTIDE ( EAST ONLY) - Normal    BNP 17     ROUTINE UA WITH " MICROSCOPIC REFLEX TO CULTURE   COVID-19 VIRUS (CORONAVIRUS) BY PCR   MAGNESIUM   TROPONIN I   CK TOTAL   GLUCOSE MONITOR NURSING POCT   GLUCOSE MONITOR NURSING POCT      EKG:  EKG: Sinus rhythm, known right bundle branch block.  No other acute ST-T wave changes.    Imaging:   XR Chest 1 View    Result Date: 4/7/2022  EXAM: XR CHEST 1 VIEW LOCATION: [facility] DATE/TIME: 4/7/2022 1:47 PM INDICATION: weakness COMPARISON: 03/07/2022     IMPRESSION: The lungs are hypoinflated with patchy bibasilar atelectasis and/or consolidation. No effusions or pneumothorax. Heart size is accentuated by technique and low lung volumes. Mild pulmonary vascular congestion without overt pulmonary edema. No  acute osseous findings.    XR Elbow Left G/E 3 Views    Result Date: 4/7/2022  EXAM: XR ELBOW LEFT G/E 3 VIEWS LOCATION: [facility] DATE/TIME: 4/7/2022 1:48 PM INDICATION: Left elbow pain. Recent fall. COMPARISON: None.     IMPRESSION: Normal joint spaces and alignment. No fracture or joint effusion.    XR Hand Left 3 Views    Result Date: 4/7/2022  EXAM: XR HAND LT G/E 3 VW LOCATION: [facility] DATE/TIME: 4/7/2022 1:46 PM INDICATION: Hand pain. COMPARISON: None.     IMPRESSION: No fracture or dislocation. Moderate degenerative changes at the MCP joint of the thumb.    XR Shoulder Right 3 Views    Result Date: 4/7/2022  EXAM: XR SHOULDER RIGHT G/E 2 VIEWS LOCATION: [facility] DATE/TIME: 4/7/2022 1:43 PM INDICATION: Right shoulder pain and limited range of motion. COMPARISON: None.     IMPRESSION: Normal joint spaces and alignment. No fracture. Small loose body in the axillary recess.    CT Chest w Contrast    Result Date: 3/9/2022  EXAM: CT CHEST W CONTRAST LOCATION: [facility] DATE/TIME: 3/9/2022 2:48 PM INDICATION: Indeterminate 1.4 cm left anterior paravertebral mass at the T8 level observed at recent  03/07/2022 MRI thoracic spine and CT CAP 10/01/2013. COMPARISON: MRI of thoracic spine 03/07/2022. TECHNIQUE: CT chest with IV contrast. Multiplanar reformats were obtained. Dose reduction techniques were used. CONTRAST: 75ml Isovue 370 FINDINGS: LUNGS AND PLEURA: Nodular and groundglass density opacities and  interlobular septal thickening in a peribronchial distribution central right upper, middle and lower lobes consistent with pneumonia. Mild bronchial wall thickening and upper lung centrilobular emphysema. Lungs otherwise clear. No pleural effusion. MEDIASTINUM/AXILLAE: Minimal enlargement right paratracheal, subcarinal and hilar lymph nodes likely reactive. Circumscribed 1.8 x 1.4 x 1.1 cm mass left anterior paravertebral fat posterior to the descending thoracic aorta (series 4 image 81) is unchanged since 03/07/2022 MRI thoracic spine but new compared with 10/01/2013 CT and most likely represents a lymph node. A lymph node in the upper left chest wall subcutaneous fat is unchanged since 2013 (image 27 of series 4). Heart size normal with no pericardial effusion. Normal caliber thoracic aorta. Enlargement central pulmonary arteries with the diameter of the main pulmonary artery 3.8 cm. Chronic patulous dilatation of the esophagus. CORONARY ARTERY CALCIFICATION: Extensive calcified atheromatous plaque throughout the LAD coronary artery. UPPER ABDOMEN: Moderate to severe hepatic steatosis and mild hepatomegaly with some degree of underlying steatohepatitis likely. Partially visualized benign-appearing renal cysts. MUSCULOSKELETAL: Spondylotic change throughout the thoracic spine.     IMPRESSION: 1.  Circumscribed 1.8 x 1.4 x 1.1 cm mass left anterior paravertebral fat posterior to the descending thoracic aorta (series 4 image 81) is unchanged since 03/07/2022 MRI thoracic spine but new compared with 10/01/2013 CT and most likely represents a lymph node. Recommend 6 month follow-up CT. 2.  Nodular and groundglass  density opacities and interlobular septal thickening in a peribronchial distribution central right upper, middle and lower lobes consistent with pneumonia with associated right reactive mediastinal and hilar lymph node enlargement. 3.  Mild bronchial wall thickening and upper lung centrilobular emphysema. 4.  Enlargement central pulmonary arteries compatible with some degree of chronic pulmonary arterial hypertension. 5.  Chronic patulous dilatation of the esophagus. 6.  Moderate to severe hepatic steatosis and mild hepatomegaly with some degree of underlying steatohepatitis likely.     XR Video Swallow with SLP or OT    Result Date: 3/9/2022  EXAM: XR VIDEO SWALLOW WITH SLP OR OT LOCATION: [facility] DATE/TIME: 3/9/2022 3:46 PM INDICATION: Difficulty swallowing. COMPARISON: None. TECHNIQUE: Routine swallow study with speech pathology using multiple barium thicknesses. FINDINGS: FLUOROSCOPIC TIME: .6 minutes NUMBER OF IMAGES: 5 Swallow study with Speech Pathology using multiple barium thicknesses. Small amount of penetration with thin liquid. Undercoating noted with puree. No other penetration or aspiration.     IR Lumbar Sacral Transfor Injection Bilateral    Result Date: 3/14/2022  [facility] INTERVENTIONAL NEURORADIOLOGY 3/11/2022 2:29 PM FLUOROSCOPICALLY GUIDED PERCUTANEOUS TRANSFORAMINAL LUMBOSACRAL EPIDURAL STEROID INJECTION (RIGHT AND LEFT L5-S1) INDICATION: History of low back pain and bilateral lower extremity radiculopathy. Epidural steroid injection requested due to poor pain control via conservative measures. CONSENT: The procedure and its indications, major risks, benefits, and alternatives were discussed. Risks including, but not limited to, pain, headache, hemorrhage, infection, nerve damage, spinal cord damage, and allergic reaction were discussed. Understanding was acknowledged, and a signed informed consent was obtained. FLUOROSCOPIC TIME: 1.7  minutes (6 images) DOSE: Air Kerma: 268 mGy ESTIMATED BLOOD LOSS: Minimal PERFORMING PHYSICIAN(S): Matheus Dai M.D. [facility] PROCEDURE: The patient was placed in the prone position upon the fluoroscopic table. The skin of the back was prepped and draped in sterile fashion. Using fluoroscopic guidance, the left L5-S1 level was localized. The skin was infiltrated with 1% lidocaine. Using direct fluoroscopic visualization a 22 G spinal needle was navigated to the region of the left L5-S1 neural foramen. Appropriate positioning was confirmed with the injection of 1 cc of Omnipaque 180. No arterial opacification was evident. We then proceeded with injection of a solution containing 3 cc of 1% lidocaine and 1 cc of dexamethasone 10 mg/mL. The needle was then removed. A dressing was applied. The procedure appeared well-tolerated. There was no apparent complication. This same procedure/sequence was then utilized to perform right L5-S1 transforaminal epidural steroid injection.     CONCLUSION: 1. Technically successful fluoroscopically-guided percutaneous transforaminal lumbosacral epidural steroid injection (right and left L5-S1). Pain severity: Pre-procedure: 8/10 Post-procedure: 5/10 _____________________________________________         Rebecca Sutton M.D  [facility]  Internal Medicine    4/7/2022  5:30 PM

## 2022-04-07 NOTE — CONSULTS
Care Management Initial Consult      General Information  Assessment completed with: Children, Pt's son Davie, as pt is severely hearing impaired. Pt does well if she can see the speakers mouth.  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  Wear Glasses or Blind: no    Cognitive  Cognitive/Neuro/Behavioral: WDL                      Living Environment:   People in home: other (see comments) (From TCU.)     Current living Arrangements: other (see comments) (From TCU.)  Name of Facility: [facility] [address]   Able to return to prior arrangements: no (Seeking alternative TCU placement.)     Family/Social Support:  Care provided by: child(pamela) (Jonas Broderick assists as needed with daily TCU visits.)  Provides care for: no one, unable/limited ability to care for self  Marital Status:   Children, Other (specify) (Jonas Broderick.)          Description of Support System: Supportive, Involved    Support Assessment: Other (see comments), Lacks adequate physical care, Lacks necessary supervision and assistance (Son reports adverse circumstances at current TCU.)    Current Resources:   Patient receiving home care services: No  Community Resources: Transitional Care  Equipment currently used at home: walker, standard  Supplies currently used at home: None    Employment/Financial:  Employment Status: retired     Financial Concerns: No concerns identified   Referral to Financial Worker: No     Lifestyle & Psychosocial Needs:  Social Determinants of Health     Tobacco Use: Medium  Risk     Smoking Tobacco Use: Former Smoker     Smokeless Tobacco Use: Former User   Alcohol Use: Not on file   Financial Resource Strain: Not on file   Food Insecurity: Not on file   Transportation Needs: Not on file   Physical Activity: Not on file   Stress: Not on file   Social Connections: Not on file   Intimate Partner Violence: Not on file   Depression: Not at risk     PHQ-2 Score: 0   Housing Stability: Not on file     Functional Status:  Prior to admission patient needed assistance:   Dependent ADLs:: Ambulation-walker, Bathing, Dressing, Transfers, Toileting  Dependent IADLs:: Cleaning, Cooking, Laundry, Shopping, Meal Preparation, Medication Management, Transportation  Assessment of Functional Status: Not at  functional baseline    Mental Health Status:  Mental Health Status: No Current Concerns       Chemical Dependency Status:  Chemical Dependency Status: No Current Concerns           Values/Beliefs:  Spiritual, Cultural Beliefs, Moravian Practices, Values that affect care: no             Additional Information:  Writer met with pt's son Davie KLINE(TC), available at [phone], while pt was occupied by radiologic procedure to introduce Care Management service(CM) and obtain an initial assessment. 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 TC reports pt arriving from [facility] [address] TCU. Various factors with care provided had prompted TC to seek transfer to an alternative TCU prior to pt's recent fall at the facility. The fall reportedly confirmed TCs decision to transfer out of the facility. Pt's hearing loss was a major barrier to her care at TCU and should be stressed to potential TCUs if actually considering pt for placement.    TC works in [place] and would prefer pt be placed at [facility] (Phone: [phone] Admissions: [phone] Fax: [phone]) - referral has been sent along with other stated preference of  "[facility] (Admissions Phone: [phone] Main Phone: [phone] Fax: [phone]). TC has been provided with MEDICARE website for comparing TCUs and stated he will review this evening of 4/7 and get back to  with additional preferences.    4/7 per GEOVANY Brewer -\"81 year old female presents to the Emergency Department for evaluation of fall. Patient appears non toxic with stable vitals signs, patient is afebrile with no tachycardia or hypoxia.  Lungs are clear, abdomen is benign.  Review the medical record shows recent discharge on 3/13/2022 for pneumonia and severe sepsis.  Patient has been at TCU since and states that she has progressive weakness.  Considered new pneumonia, CHF, considered but less likely ACS, UTI, with slight abnormality or anemia.  She endorses recent fall hitting her left elbow and shoulder but denies hitting her head or neck.  She denies any head or neck pain.  Abdomen is benign with no focal tenderness and she is able to range her extremities well.  Nothing to suggest intrathoracic, intra-abdominal, nothing to suggest depressed skull fracture or cervical fracture or dislocation.  We will obtain plain films of the extremities, screening labs, chest x-ray and urine studies.  Patient and son complaining about her TCU and so I will have them be evaluated by our care manager.     Reassessment: Labs showed no acute concerning findings, troponin negative and ECG nonischemic.  Patient has no elevated white blood cell count, no fever.  Plain films reported no acute concerning findings.  Chest x-ray reported atelectasis versus consolidation, clinically I do not suspect pneumonia as again she denies any new cough, she has been afebrile here with no elevated white blood cell count.  Care manager spoke with the patient's son and at this time he is recommending admission for observation and then likely new TCU placement tomorrow.  Repeat exam is benign I discussed options " "for admission and both patient and son are agreeable to this.  COVID and urine studies pending at this time.  Patient case discussed with the admitting service.\"     PT/OT Consults are currently pending. However, TCU placement is the current discharge disposition and include pt/fam direction for care. TC stated he will provide transport if pt is physically able to transfer to and from his vehicle. In addition, TC may be required to be at work at time of pt discharge and has consented to utilizing transport services appropriately. Private-pay costs discussed if not covered by insurance. CM to follow-up on referrals and obtain additional TCU placement preferences from TC.      Derrek Mobley RN        "

## 2022-04-08 ENCOUNTER — APPOINTMENT (OUTPATIENT)
Dept: OCCUPATIONAL THERAPY | Facility: CLINIC | Age: 82
End: 2022-04-08
Attending: INTERNAL MEDICINE
Payer: MEDICARE

## 2022-04-08 ENCOUNTER — APPOINTMENT (OUTPATIENT)
Dept: PHYSICAL THERAPY | Facility: CLINIC | Age: 82
End: 2022-04-08
Attending: INTERNAL MEDICINE
Payer: MEDICARE

## 2022-04-08 LAB
ALBUMIN UR-MCNC: NEGATIVE MG/DL
ANION GAP SERPL CALCULATED.3IONS-SCNC: 14 MMOL/L (ref 5–18)
APPEARANCE UR: CLEAR
ATRIAL RATE - MUSE: 78 BPM
BACTERIA #/AREA URNS HPF: ABNORMAL /HPF
BILIRUB UR QL STRIP: NEGATIVE
BUN SERPL-MCNC: 20 MG/DL (ref 8–28)
CALCIUM SERPL-MCNC: 9.3 MG/DL (ref 8.5–10.5)
CHLORIDE BLD-SCNC: 101 MMOL/L (ref 98–107)
CO2 SERPL-SCNC: 30 MMOL/L (ref 22–31)
COLOR UR AUTO: ABNORMAL
CREAT SERPL-MCNC: 0.84 MG/DL (ref 0.6–1.1)
DIASTOLIC BLOOD PRESSURE - MUSE: NORMAL MMHG
GFR SERPL CREATININE-BSD FRML MDRD: 70 ML/MIN/1.73M2
GLUCOSE BLD-MCNC: 93 MG/DL (ref 70–125)
GLUCOSE BLDC GLUCOMTR-MCNC: 120 MG/DL (ref 70–99)
GLUCOSE BLDC GLUCOMTR-MCNC: 120 MG/DL (ref 70–99)
GLUCOSE BLDC GLUCOMTR-MCNC: 232 MG/DL (ref 70–99)
GLUCOSE BLDC GLUCOMTR-MCNC: 260 MG/DL (ref 70–99)
GLUCOSE BLDC GLUCOMTR-MCNC: 94 MG/DL (ref 70–99)
GLUCOSE UR STRIP-MCNC: >1000 MG/DL
HGB UR QL STRIP: NEGATIVE
HYALINE CASTS: 3 /LPF
INTERPRETATION ECG - MUSE: NORMAL
KETONES UR STRIP-MCNC: NEGATIVE MG/DL
LEUKOCYTE ESTERASE UR QL STRIP: ABNORMAL
MUCOUS THREADS #/AREA URNS LPF: PRESENT /LPF
NITRATE UR QL: NEGATIVE
P AXIS - MUSE: 49 DEGREES
PH UR STRIP: 6.5 [PH] (ref 5–7)
POTASSIUM BLD-SCNC: 3.6 MMOL/L (ref 3.5–5)
PR INTERVAL - MUSE: 146 MS
QRS DURATION - MUSE: 142 MS
QT - MUSE: 430 MS
QTC - MUSE: 490 MS
R AXIS - MUSE: 36 DEGREES
RBC URINE: 6 /HPF
SODIUM SERPL-SCNC: 145 MMOL/L (ref 136–145)
SP GR UR STRIP: 1.02 (ref 1–1.03)
SQUAMOUS EPITHELIAL: 10 /HPF
SYSTOLIC BLOOD PRESSURE - MUSE: NORMAL MMHG
T AXIS - MUSE: 42 DEGREES
TROPONIN I SERPL-MCNC: 0.01 NG/ML (ref 0–0.29)
UROBILINOGEN UR STRIP-MCNC: <2 MG/DL
VENTRICULAR RATE- MUSE: 78 BPM
WBC URINE: 33 /HPF

## 2022-04-08 PROCEDURE — G0378 HOSPITAL OBSERVATION PER HR: HCPCS

## 2022-04-08 PROCEDURE — 97166 OT EVAL MOD COMPLEX 45 MIN: CPT | Mod: GO

## 2022-04-08 PROCEDURE — 250N000012 HC RX MED GY IP 250 OP 636 PS 637: Performed by: INTERNAL MEDICINE

## 2022-04-08 PROCEDURE — 82435 ASSAY OF BLOOD CHLORIDE: CPT | Performed by: INTERNAL MEDICINE

## 2022-04-08 PROCEDURE — 81001 URINALYSIS AUTO W/SCOPE: CPT | Performed by: EMERGENCY MEDICINE

## 2022-04-08 PROCEDURE — 258N000003 HC RX IP 258 OP 636: Performed by: EMERGENCY MEDICINE

## 2022-04-08 PROCEDURE — 97162 PT EVAL MOD COMPLEX 30 MIN: CPT | Mod: GP

## 2022-04-08 PROCEDURE — 84484 ASSAY OF TROPONIN QUANT: CPT | Performed by: INTERNAL MEDICINE

## 2022-04-08 PROCEDURE — 87086 URINE CULTURE/COLONY COUNT: CPT | Performed by: EMERGENCY MEDICINE

## 2022-04-08 PROCEDURE — 97535 SELF CARE MNGMENT TRAINING: CPT | Mod: GO

## 2022-04-08 PROCEDURE — 82962 GLUCOSE BLOOD TEST: CPT

## 2022-04-08 PROCEDURE — 250N000013 HC RX MED GY IP 250 OP 250 PS 637: Performed by: INTERNAL MEDICINE

## 2022-04-08 PROCEDURE — 99225 PR SUBSEQUENT OBSERVATION CARE,LEVEL II: CPT | Performed by: INTERNAL MEDICINE

## 2022-04-08 PROCEDURE — 36415 COLL VENOUS BLD VENIPUNCTURE: CPT | Performed by: INTERNAL MEDICINE

## 2022-04-08 PROCEDURE — 97116 GAIT TRAINING THERAPY: CPT | Mod: GP

## 2022-04-08 PROCEDURE — 96372 THER/PROPH/DIAG INJ SC/IM: CPT | Performed by: INTERNAL MEDICINE

## 2022-04-08 RX ADMIN — PREDNISONE 6 MG: 1 TABLET ORAL at 08:14

## 2022-04-08 RX ADMIN — OXYCODONE HYDROCHLORIDE AND ACETAMINOPHEN 1 TABLET: 5; 325 TABLET ORAL at 01:16

## 2022-04-08 RX ADMIN — METHOCARBAMOL 500 MG: 500 TABLET ORAL at 20:21

## 2022-04-08 RX ADMIN — METFORMIN HYDROCHLORIDE 500 MG: 500 TABLET, FILM COATED ORAL at 07:58

## 2022-04-08 RX ADMIN — FUROSEMIDE 20 MG: 20 TABLET ORAL at 07:57

## 2022-04-08 RX ADMIN — CARVEDILOL 3.12 MG: 3.12 TABLET, FILM COATED ORAL at 18:00

## 2022-04-08 RX ADMIN — AMLODIPINE BESYLATE 10 MG: 5 TABLET ORAL at 08:01

## 2022-04-08 RX ADMIN — CAPSAICIN: 0.25 CREAM TOPICAL at 09:58

## 2022-04-08 RX ADMIN — PANTOPRAZOLE SODIUM 40 MG: 20 TABLET, DELAYED RELEASE ORAL at 08:02

## 2022-04-08 RX ADMIN — MICONAZOLE NITRATE: 20 POWDER TOPICAL at 20:23

## 2022-04-08 RX ADMIN — INSULIN ASPART 2 UNITS: 100 INJECTION, SOLUTION INTRAVENOUS; SUBCUTANEOUS at 14:48

## 2022-04-08 RX ADMIN — GLIPIZIDE 10 MG: 10 TABLET ORAL at 18:00

## 2022-04-08 RX ADMIN — LAMOTRIGINE 100 MG: 100 TABLET ORAL at 07:57

## 2022-04-08 RX ADMIN — DULOXETINE HYDROCHLORIDE 60 MG: 30 CAPSULE, DELAYED RELEASE ORAL at 08:01

## 2022-04-08 RX ADMIN — POLYETHYLENE GLYCOL 3350 17 G: 17 POWDER, FOR SOLUTION ORAL at 09:48

## 2022-04-08 RX ADMIN — LAMOTRIGINE 100 MG: 100 TABLET ORAL at 20:20

## 2022-04-08 RX ADMIN — SODIUM CHLORIDE: 9 INJECTION, SOLUTION INTRAVENOUS at 05:16

## 2022-04-08 RX ADMIN — MELOXICAM 15 MG: 7.5 TABLET ORAL at 08:15

## 2022-04-08 RX ADMIN — CAPSAICIN: 0.25 CREAM TOPICAL at 22:13

## 2022-04-08 RX ADMIN — MIRABEGRON 25 MG: 25 TABLET, FILM COATED, EXTENDED RELEASE ORAL at 22:14

## 2022-04-08 RX ADMIN — OXYCODONE HYDROCHLORIDE AND ACETAMINOPHEN 1 TABLET: 5; 325 TABLET ORAL at 14:51

## 2022-04-08 RX ADMIN — SENNOSIDES 2 TABLET: 8.6 TABLET ORAL at 09:48

## 2022-04-08 RX ADMIN — OXYCODONE HYDROCHLORIDE AND ACETAMINOPHEN 1 TABLET: 5; 325 TABLET ORAL at 08:10

## 2022-04-08 RX ADMIN — Medication 1000 MCG: at 08:15

## 2022-04-08 RX ADMIN — EMPAGLIFLOZIN 25 MG: 25 TABLET, FILM COATED ORAL at 08:10

## 2022-04-08 RX ADMIN — METHOCARBAMOL 500 MG: 500 TABLET ORAL at 07:58

## 2022-04-08 RX ADMIN — ATORVASTATIN CALCIUM 10 MG: 10 TABLET, FILM COATED ORAL at 22:12

## 2022-04-08 RX ADMIN — SENNOSIDES 2 TABLET: 8.6 TABLET ORAL at 20:20

## 2022-04-08 RX ADMIN — GABAPENTIN 200 MG: 100 CAPSULE ORAL at 20:20

## 2022-04-08 RX ADMIN — CHOLECALCIFEROL TAB 125 MCG (5000 UNIT) 150 MCG: 125 TAB at 07:58

## 2022-04-08 RX ADMIN — METHOCARBAMOL 500 MG: 500 TABLET ORAL at 12:18

## 2022-04-08 RX ADMIN — TRIAMTERENE AND HYDROCHLOROTHIAZIDE 1 TABLET: 37.5; 25 TABLET ORAL at 08:12

## 2022-04-08 RX ADMIN — CARVEDILOL 3.12 MG: 3.12 TABLET, FILM COATED ORAL at 08:11

## 2022-04-08 RX ADMIN — METHOCARBAMOL 500 MG: 500 TABLET ORAL at 16:47

## 2022-04-08 RX ADMIN — MICONAZOLE NITRATE: 20 POWDER TOPICAL at 09:56

## 2022-04-08 RX ADMIN — METFORMIN HYDROCHLORIDE 500 MG: 500 TABLET, FILM COATED ORAL at 18:00

## 2022-04-08 RX ADMIN — GLIPIZIDE 10 MG: 10 TABLET ORAL at 08:11

## 2022-04-08 RX ADMIN — LISINOPRIL 5 MG: 5 TABLET ORAL at 07:58

## 2022-04-08 RX ADMIN — GABAPENTIN 200 MG: 100 CAPSULE ORAL at 07:57

## 2022-04-08 NOTE — PROGRESS NOTES
Care Management Follow Up    Length of Stay (days): 0    Expected Discharge Date: 04/08/2022     Concerns to be Addressed: care coordination/care conferences, discharge planning, other (see comments) (TCU  placement.)     Patient plan of care discussed at interdisciplinary rounds: Yes    Anticipated Discharge Disposition: Transitional Care     Anticipated Discharge Services: None  Anticipated Discharge DME: None    Patient/family educated on Medicare website which has current facility and service quality ratings: yes  Education Provided on the Discharge Plan:    Patient/Family in Agreement with the Plan: yes    Referrals Placed by CM/SW: Post Acute Facilities  Private pay costs discussed: Not applicable    Additional Information:  Following pt regarding TCU placement.  Received call from pt's son regarding additional TCU choices, referrals made.      CARISSA Bowen

## 2022-04-08 NOTE — PLAN OF CARE
"PRIMARY DIAGNOSIS: \"GENERIC\" NURSING  OUTPATIENT/OBSERVATION GOALS TO BE MET BEFORE DISCHARGE:  ADLs back to baseline: Yes    Activity and level of assistance: Up with maximum assistance. Consider SW and/or PT evaluation.     Pain status: Improved-controlled with oral pain medications.    Return to near baseline physical activity: No      Patient is [place]. Reports generalized pain, mostly in upper extremities. PRN percocet given. Patient has NS running in PIV. Purewick in place. Has swelling to BLE. Alarms on.  "

## 2022-04-08 NOTE — PLAN OF CARE
"Vss other than BP's which have been elevated in the 150-170's. Pt states pain is generalized r/t osteoarthritis, spinal stenosis, and the fall she experienced at the TCU. Generalized bruising. Pt states has no hearing in the right ear and is [place] in the left. Plan for pt to discharge to a TCU once appropriate placement is found. Will continue to monitor.     PRIMARY DIAGNOSIS: \"GENERIC\" NURSING  OUTPATIENT/OBSERVATION GOALS TO BE MET BEFORE DISCHARGE:  ADLs back to baseline: No    Activity and level of assistance: Up with maximum assistance. Consider SW and/or PT evaluation. (PT/OT consulted to work with pt)    Pain status: Improved-controlled with oral pain medications.    Return to near baseline physical activity: No     Discharge Planner Nurse   Safe discharge environment identified: No (Pending TCU placement)  Barriers to discharge: Yes       Entered by: Marce Macario 04/07/2022 10:33 PM     Please review provider order for any additional goals.   Nurse to notify provider when observation goals have been met and patient is ready for discharge.    "

## 2022-04-08 NOTE — PROGRESS NOTES
"PRIMARY DIAGNOSIS: \"GENERIC\" NURSING  OUTPATIENT/OBSERVATION GOALS TO BE MET BEFORE DISCHARGE:  1. ADLs back to baseline: No    2. Activity and level of assistance: Up with maximum assistance. Consider SW and/or PT evaluation.     3. Pain status: Improved-controlled with oral pain medications.    4. Return to near baseline physical activity: Yes     Discharge Planner Nurse   Safe discharge environment identified: No   Barriers to discharge: Yes - identifying proper destination       Entered by: Gaby Contreras 04/08/2022 12:47 PM     Please review provider order for any additional goals.   Nurse to notify provider when observation goals have been met and patient is ready for discharge.  "

## 2022-04-08 NOTE — PROGRESS NOTES
04/08/22 1032   Quick Adds   Type of Visit Initial Occupational Therapy Evaluation   Living Environment   People in Home alone   Current Living Arrangements apartment   Home Accessibility no concerns   Transportation Anticipated   (Son)   Living Environment Comments Pt has elevator access - lives on 7th floor. Pt has walk in shower  w/ 4 inch lip w/ shower chair and grab bars. Pt has elevated toilet seat w/ FWW.   Self-Care   Usual Activity Tolerance fair   Current Activity Tolerance poor   Equipment Currently Used at Home grab bar, tub/shower;raised toilet seat;shower chair;walker, rolling   Fall history within last six months yes   Number of times patient has fallen within last six months 1   Activity/Exercise/Self-Care Comment Pt needed help w/ ADLs and IADLs at baseline. Pt has weekly  RN come to her home to set up meds.    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   Pain Assessment   Patient Currently in Pain No   Integumentary/Edema   Integumentary/Edema Comments BLEs edema   Posture   Posture forward head position   Range of Motion Comprehensive   General Range of Motion bilateral upper extremity ROM WFL   Strength Comprehensive (MMT)   General Manual Muscle Testing (MMT) Assessment no strength deficits identified   Muscle Tone Assessment   Muscle Tone Quick Adds No deficits were identified   Coordination   Upper Extremity Coordination Left UE impaired;Right UE  impaired  (rheumatoid arthritis)   Bed Mobility   Bed Mobility rolling left;rolling right;scooting/bridging;supine-sit;sit-supine   Comment (Bed Mobility) Min A for bed mobility   Transfers   Transfers sit-stand transfer;toilet transfer   Transfer Comments Min A - Mod A for all transfers   Clinical Impression   Criteria for Skilled Therapeutic Interventions Met (OT) Yes, treatment indicated   OT Diagnosis decreaesd ind w/ ADLs   Influenced by the following impairments fall risk   OT Problem List-Impairments impacting ADL activity tolerance impaired;balance;hearing;mobility   Assessment of Occupational Performance 3-5 Performance Deficits   Identified Performance Deficits LE dressing, bed mobility, al ltransfers   Planned Therapy Interventions (OT) ADL retraining;bed mobility training;strengthening;transfer training   Clinical Decision Making Complexity (OT) moderate complexity   Risk & Benefits of therapy have been explained care plan/treatment goals reviewed;patient;son   OT Discharge Planning   OT Discharge Recommendation (DC Rec) Transitional Care Facility   OT Rationale for DC Rec Pt well below baseline and will need 24 hr supervision. High risk for falls. Education provided to her and son about living w/ 24 hr supervision going forward.   Therapy Certification   Start of Care Date 04/08/22   Certification date from 04/08/22   Certification date to 05/08/22   Medical Diagnosis Fall   Total Evaluation Time (Minutes)   Total Evaluation Time (Minutes) 10   OT Goals   Therapy Frequency (OT) Daily   OT Predicated Duration/Target Date for Goal Attainment 04/12/22   OT Goals Lower Body Dressing;Bed Mobility;Toilet Transfer/Toileting   OT: Lower Body Dressing Modified independent;using adaptive equipment   OT: Bed Mobility Modified independent;supine to/from sitting;rolling;bridging   OT: Toilet Transfer/Toileting Supervision/stand-by assist;cleaning and garment management

## 2022-04-08 NOTE — PROGRESS NOTES
04/08/22 1032   Therapy Certification   Start of Care Date 04/08/22   Certification date from 04/08/22   Certification date to 05/08/22   Medical Diagnosis Fall                                                                                 [facility]      OUTPATIENT OCCUPATIONAL THERAPY  EVALUATION  PLAN OF TREATMENT FOR OUTPATIENT REHABILITATION  (COMPLETE FOR INITIAL CLAIMS ONLY)  Patient's Last Name, First Name, M.I.  YOB: 1940  Tonya Yang                          Provider's Name  [facility] Medical Record No.  [id number]                               Onset Date:  04/07/22   Start of Care Date:  04/08/22     Type:     ___PT   _X_OT   ___SLP Medical Diagnosis:  Fall                        OT Diagnosis:  decreaesd ind w/ ADLs   Visits from SOC:  1   _________________________________________________________________________________  Plan of Treatment/Functional Goals    Planned Interventions: ADL retraining, bed mobility training, strengthening, transfer training   Goals: See Occupational Therapy Goals on Care Plan in [facility] electronic health record.    Therapy Frequency: Daily  Predicted Duration of Therapy Intervention: 04/12/22  _________________________________________________________________________________    I CERTIFY THE NEED FOR THESE SERVICES FURNISHED UNDER        THIS PLAN OF TREATMENT AND WHILE UNDER MY CARE     (Physician co-signature of this document indicates review and certification of the therapy plan).                Certification date from: 04/08/22, Certification date to: 05/08/22    Referring Physician: Rebecca Sutton MD            Initial Assessment        See Occupational Therapy evaluation dated 04/08/22 in Epic electronic health record.

## 2022-04-08 NOTE — PROGRESS NOTES
[facility]      OUTPATIENT PHYSICAL THERAPY EVALUATION  PLAN OF TREATMENT FOR OUTPATIENT REHABILITATION  (COMPLETE FOR INITIAL CLAIMS ONLY)  Patient's Last Name, First Name, M.I.  YOB: 1940  TreyTonya LOPEZ                        Provider's Name  [facility] Medical Record No.  [id number]                               Onset Date:  04/07/22   Start of Care Date:  (P) 04/08/22      Type:     _X_PT   ___OT   ___SLP Medical Diagnosis:  (P) weakness                        PT Diagnosis:  impaired functional mobility   Visits from SOC:  1   _________________________________________________________________________________  Plan of Treatment/Functional Goals    Planned Interventions: bed mobility training, gait training, home exercise program, patient/family education, strengthening, transfer training     Goals: See Physical Therapy Goals on Care Plan in [facility] electronic health record.    Therapy Frequency: Daily  Predicted Duration of Therapy Intervention: 04/14/22  _________________________________________________________________________________    I CERTIFY THE NEED FOR THESE SERVICES FURNISHED UNDER        THIS PLAN OF TREATMENT AND WHILE UNDER MY CARE     (Physician co-signature of this document indicates review and certification of the therapy plan).                Certification date from: (P) 04/08/22, Certification date to: (P) 05/08/22    Referring Physician: Rebecca Sutton MD            Initial Assessment        See Physical Therapy evaluation dated (P) 04/08/22 in Epic electronic health record.

## 2022-04-08 NOTE — PLAN OF CARE
"  Problem: Plan of Care - These are the overarching goals to be used throughout the patient stay.    Goal: Plan of Care Review/Shift Note  Description: The Plan of Care Review/Shift note should be completed every shift.  The Outcome Evaluation is a brief statement about your assessment that the patient is improving, declining, or no change.  This information will be displayed automatically on your shift note.  Outcome: Ongoing, Progressing  Goal: Patient-Specific Goal (Individualized)  Description: You can add care plan individualizations to a care plan. Examples of Individualization might be:  \"Parent requests to be called daily at 9am for status\", \"I have a hard time hearing out of my right ear\", or \"Do not touch me to wake me up as it startles me\".  Outcome: Ongoing, Progressing  Goal: Absence of Hospital-Acquired Illness or Injury  Outcome: Ongoing, Progressing  Intervention: Identify and Manage Fall Risk  Recent Flowsheet Documentation  Taken 4/8/2022 0810 by Gaby Contreras, RN  Safety Promotion/Fall Prevention: assistive device/personal items within reach  Intervention: Prevent Skin Injury  Recent Flowsheet Documentation  Taken 4/8/2022 0810 by Gaby Contreras RN  Body Position: position changed independently  Intervention: Prevent and Manage VTE (Venous Thromboembolism) Risk  Recent Flowsheet Documentation  Taken 4/8/2022 0810 by Gaby Contreras RN  Activity Management: up to bedside commode  Goal: Optimal Comfort and Wellbeing  Outcome: Ongoing, Progressing  Intervention: Monitor Pain and Promote Comfort  Recent Flowsheet Documentation  Taken 4/8/2022 0855 by Gaby Contreras RN  Pain Management Interventions:   emotional support   environmental changes  Taken 4/8/2022 0810 by Gaby Contreras RN  Pain Management Interventions: medication (see MAR)  Goal: Readiness for Transition of Care  Outcome: Ongoing, Progressing     Problem: Diabetes Comorbidity  Goal: Blood Glucose Level Within " Targeted Range  Outcome: Ongoing, Progressing     Problem: Hypertension Comorbidity  Goal: Blood Pressure in Desired Range  Outcome: Ongoing, Progressing  Intervention: Maintain Blood Pressure Management  Recent Flowsheet Documentation  Taken 4/8/2022 0810 by Gaby Contreras, RN  Medication Review/Management: medications reviewed     Goal Outcome Evaluation:      Patient is A&Ox4 and is currently awake. Patient is experiencing intermittent pain with movement, and was provided PO medication to intervene. VSS and BG continues to be monitored and treated as needed. Plan of care to continue today per providers and care teams, with discharge possible pending continued improvement and bed availability.    Gaby Contreras, RN

## 2022-04-08 NOTE — PROGRESS NOTES
"PRIMARY DIAGNOSIS: \"GENERIC\" NURSING  OUTPATIENT/OBSERVATION GOALS TO BE MET BEFORE DISCHARGE:  1. ADLs back to baseline: No    2. Activity and level of assistance: Up with maximum assistance. Consider SW and/or PT evaluation. (PT/OT consulted to work with pt tomorrow)     3. Pain status: Improved-controlled with oral pain medications.    4. Return to near baseline physical activity: No     Discharge Planner Nurse   Safe discharge environment identified: No (Pending TCU placement)  Barriers to discharge: Yes       Entered by: Marce Macario 04/07/2022 10:35 PM     Please review provider order for any additional goals.   Nurse to notify provider when observation goals have been met and patient is ready for discharge.  "

## 2022-04-09 LAB
GLUCOSE BLDC GLUCOMTR-MCNC: 103 MG/DL (ref 70–99)
GLUCOSE BLDC GLUCOMTR-MCNC: 168 MG/DL (ref 70–99)
GLUCOSE BLDC GLUCOMTR-MCNC: 183 MG/DL (ref 70–99)
GLUCOSE BLDC GLUCOMTR-MCNC: 82 MG/DL (ref 70–99)

## 2022-04-09 PROCEDURE — 99225 PR SUBSEQUENT OBSERVATION CARE,LEVEL II: CPT | Performed by: INTERNAL MEDICINE

## 2022-04-09 PROCEDURE — 250N000013 HC RX MED GY IP 250 OP 250 PS 637: Performed by: PAIN MEDICINE

## 2022-04-09 PROCEDURE — 96372 THER/PROPH/DIAG INJ SC/IM: CPT | Performed by: INTERNAL MEDICINE

## 2022-04-09 PROCEDURE — 99417 PROLNG OP E/M EACH 15 MIN: CPT | Performed by: PAIN MEDICINE

## 2022-04-09 PROCEDURE — 250N000012 HC RX MED GY IP 250 OP 636 PS 637: Performed by: INTERNAL MEDICINE

## 2022-04-09 PROCEDURE — 250N000009 HC RX 250: Performed by: PAIN MEDICINE

## 2022-04-09 PROCEDURE — 82962 GLUCOSE BLOOD TEST: CPT | Mod: 91

## 2022-04-09 PROCEDURE — 99205 OFFICE O/P NEW HI 60 MIN: CPT | Performed by: PAIN MEDICINE

## 2022-04-09 PROCEDURE — 250N000013 HC RX MED GY IP 250 OP 250 PS 637: Performed by: INTERNAL MEDICINE

## 2022-04-09 PROCEDURE — G0378 HOSPITAL OBSERVATION PER HR: HCPCS

## 2022-04-09 RX ORDER — AMOXICILLIN 250 MG
1 CAPSULE ORAL 2 TIMES DAILY
Status: DISCONTINUED | OUTPATIENT
Start: 2022-04-09 | End: 2022-04-13

## 2022-04-09 RX ORDER — FUROSEMIDE 40 MG
40 TABLET ORAL DAILY
Status: DISCONTINUED | OUTPATIENT
Start: 2022-04-10 | End: 2022-04-12

## 2022-04-09 RX ORDER — EMOLLIENT BASE
CREAM (GRAM) TOPICAL 3 TIMES DAILY
Status: DISCONTINUED | OUTPATIENT
Start: 2022-04-09 | End: 2022-04-15 | Stop reason: HOSPADM

## 2022-04-09 RX ORDER — FUROSEMIDE 20 MG
20 TABLET ORAL DAILY
Status: DISCONTINUED | OUTPATIENT
Start: 2022-04-10 | End: 2022-04-09

## 2022-04-09 RX ORDER — PREGABALIN 25 MG/1
25 CAPSULE ORAL 2 TIMES DAILY
Status: DISCONTINUED | OUTPATIENT
Start: 2022-04-09 | End: 2022-04-14

## 2022-04-09 RX ORDER — FUROSEMIDE 10 MG/ML
40 INJECTION INTRAMUSCULAR; INTRAVENOUS ONCE
Status: DISCONTINUED | OUTPATIENT
Start: 2022-04-09 | End: 2022-04-13

## 2022-04-09 RX ORDER — LIDOCAINE 50 MG/G
OINTMENT TOPICAL 3 TIMES DAILY
Status: DISCONTINUED | OUTPATIENT
Start: 2022-04-09 | End: 2022-04-13

## 2022-04-09 RX ORDER — MINERAL OIL/HYDROPHIL PETROLAT
OINTMENT (GRAM) TOPICAL 3 TIMES DAILY
Status: DISCONTINUED | OUTPATIENT
Start: 2022-04-09 | End: 2022-04-15 | Stop reason: HOSPADM

## 2022-04-09 RX ADMIN — WHITE PETROLATUM: 1.75 OINTMENT TOPICAL at 20:52

## 2022-04-09 RX ADMIN — CARVEDILOL 3.12 MG: 3.12 TABLET, FILM COATED ORAL at 17:57

## 2022-04-09 RX ADMIN — OXYCODONE HYDROCHLORIDE AND ACETAMINOPHEN 1 TABLET: 5; 325 TABLET ORAL at 15:08

## 2022-04-09 RX ADMIN — ATORVASTATIN CALCIUM 10 MG: 10 TABLET, FILM COATED ORAL at 21:26

## 2022-04-09 RX ADMIN — Medication 1000 MCG: at 08:48

## 2022-04-09 RX ADMIN — METHOCARBAMOL 500 MG: 500 TABLET ORAL at 20:50

## 2022-04-09 RX ADMIN — LIDOCAINE: 50 OINTMENT TOPICAL at 20:51

## 2022-04-09 RX ADMIN — CAPSAICIN: 0.25 CREAM TOPICAL at 20:51

## 2022-04-09 RX ADMIN — GLIPIZIDE 10 MG: 10 TABLET ORAL at 16:38

## 2022-04-09 RX ADMIN — CHOLECALCIFEROL TAB 125 MCG (5000 UNIT) 150 MCG: 125 TAB at 08:46

## 2022-04-09 RX ADMIN — LAMOTRIGINE 100 MG: 100 TABLET ORAL at 08:46

## 2022-04-09 RX ADMIN — OXYCODONE HYDROCHLORIDE AND ACETAMINOPHEN 1 TABLET: 5; 325 TABLET ORAL at 23:49

## 2022-04-09 RX ADMIN — METHOCARBAMOL 500 MG: 500 TABLET ORAL at 16:38

## 2022-04-09 RX ADMIN — PREGABALIN 25 MG: 25 CAPSULE ORAL at 20:49

## 2022-04-09 RX ADMIN — METHOCARBAMOL 500 MG: 500 TABLET ORAL at 08:45

## 2022-04-09 RX ADMIN — LIDOCAINE: 50 OINTMENT TOPICAL at 16:39

## 2022-04-09 RX ADMIN — LISINOPRIL 5 MG: 5 TABLET ORAL at 08:45

## 2022-04-09 RX ADMIN — Medication 1 MG: at 23:51

## 2022-04-09 RX ADMIN — METHOCARBAMOL 500 MG: 500 TABLET ORAL at 12:48

## 2022-04-09 RX ADMIN — GLIPIZIDE 10 MG: 10 TABLET ORAL at 06:50

## 2022-04-09 RX ADMIN — CAPSAICIN: 0.25 CREAM TOPICAL at 08:49

## 2022-04-09 RX ADMIN — METFORMIN HYDROCHLORIDE 500 MG: 500 TABLET, FILM COATED ORAL at 08:48

## 2022-04-09 RX ADMIN — GABAPENTIN 200 MG: 100 CAPSULE ORAL at 08:45

## 2022-04-09 RX ADMIN — MICONAZOLE NITRATE: 20 POWDER TOPICAL at 20:50

## 2022-04-09 RX ADMIN — EMPAGLIFLOZIN 25 MG: 25 TABLET, FILM COATED ORAL at 08:47

## 2022-04-09 RX ADMIN — CAPSAICIN: 0.25 CREAM TOPICAL at 15:04

## 2022-04-09 RX ADMIN — Medication: at 20:51

## 2022-04-09 RX ADMIN — Medication: at 16:39

## 2022-04-09 RX ADMIN — PANTOPRAZOLE SODIUM 40 MG: 20 TABLET, DELAYED RELEASE ORAL at 08:45

## 2022-04-09 RX ADMIN — MELOXICAM 15 MG: 7.5 TABLET ORAL at 08:44

## 2022-04-09 RX ADMIN — WHITE PETROLATUM: 1.75 OINTMENT TOPICAL at 16:38

## 2022-04-09 RX ADMIN — PREDNISONE 6 MG: 1 TABLET ORAL at 08:47

## 2022-04-09 RX ADMIN — MIRABEGRON 25 MG: 25 TABLET, FILM COATED, EXTENDED RELEASE ORAL at 21:27

## 2022-04-09 RX ADMIN — MICONAZOLE NITRATE: 20 POWDER TOPICAL at 08:50

## 2022-04-09 RX ADMIN — LAMOTRIGINE 100 MG: 100 TABLET ORAL at 20:49

## 2022-04-09 RX ADMIN — CARVEDILOL 3.12 MG: 3.12 TABLET, FILM COATED ORAL at 08:48

## 2022-04-09 RX ADMIN — DULOXETINE HYDROCHLORIDE 60 MG: 30 CAPSULE, DELAYED RELEASE ORAL at 08:49

## 2022-04-09 RX ADMIN — AMLODIPINE BESYLATE 10 MG: 5 TABLET ORAL at 08:47

## 2022-04-09 RX ADMIN — METFORMIN HYDROCHLORIDE 500 MG: 500 TABLET, FILM COATED ORAL at 17:57

## 2022-04-09 NOTE — PROGRESS NOTES
PRIMARY DIAGNOSIS: GENERALIZED WEAKNESS    OUTPATIENT/OBSERVATION GOALS TO BE MET BEFORE DISCHARGE  1. Orthostatic performed: N/A    2. Tolerating PO medications: Yes    3. Return to near baseline physical activity: No  Up with assist of 1-2  4. Cleared for discharge by consultants (if involved): No    Discharge Planner Nurse   Safe discharge environment identified: No  Barriers to discharge: Yes - pending TCU discharge        Entered by: Ericka Crowder 04/09/2022 4:00 AM     Please review provider order for any additional goals.   Nurse to notify provider when observation goals have been met and patient is ready for discharge.   Alert and oriented to person, place, time/situation. normal mood and affect. no apparent risk to self or others.

## 2022-04-09 NOTE — CONSULTS
"[facility] ACUTE PAIN SERVICE CONSULTATION ([facility], [facility], [facility])     Date of Admission:  4/7/2022  Date of Consult (When I saw the patient): 04/09/22  Physician requesting consult: Dr. Sutton   Reason for consult: chronic pain, polypharmacy     Assessment/Plan:     Tonya Yang is a 81 year old female who was admitted on 4/7/2022.   . I was asked to see the patient for chronic pain, polypharmacy. Admitted for weakness and a [id number] in the ER were negative. History of HTN, DM 2, obesity, neuropathy, polyarthralgia, bipolar disorder, chronic low back pain and spinal stenosis..  Recent Er visit- noted severe multilevel spinal stenosis with bilateral neuroforaminal stenosis and compression of bilateral exiting cauda equina nerve roots, left greater than right.  Spine surgery was consulted with ensuing discussion with the patient and her son, and it was recommended that she recover from pneumonia and then reassess the need for surgical intervention-although I personally discussed this with neurosurgery today and she is not a surgical candidate.. At baseline she takes Cymbalta, gabapentin, Meloxicam, Lamictal, Robaxin, Percocet, and prednisone.  Pain began many years ago, worse over the last 7 years. Pain is located \"all over\" and she states \"the better question is where do I NOT have pain.\" Mostly in the low back as well as left hand, left arm, right arm, reports neuropathic tingling.  The pain moves throughout the years and also sometimes radiates down the buttock, into the shoulders, and neck.  She states the pain is, \"the highest it could be\".  She states it is unbelievable and typically 10/10.  She is a previous patient under a pain clinic.    PLAN:   1) weakness and fall likely due to polypharmacy.  Would not recommend diazepam with Percocet given risk for overdose. Consider NeuroSpine Consult here again.  But she has pain in so many locations it is difficult to understand " exactly what her pain generator is.  I would not add additional medications to her treatment plan given concern for polypharmacy.  I do think she would be a good candidate for an intrathecal pain pump and would ask neurosurgery to weigh in on that before I send a referral to the pain clinic for consideration of intrathecal pain pump.  Additionally I would stop the Valium, this was not intended to be a chronic pain med.  It will make it extremely challenging to titrate opioids when she is taking so many different drugs.  Discontinue gabapentin and favor Lyrica trial given concern of lower extremity edema and no benefit with gabapentin.  2)Multimodal Medication Therapy  Topical:  Lidocaine patch 4% and Diclofenac ointment  Adjuvants:  Tyenol 1G TID and Gabapentin . Hx of CKD avoid NSAIDs CrtCl is 61.9mL/min  would aviod meloxicam, stop gabapentin and replace with lyrica  Antidepressants/anxiolytics: receives duloxetine, Lamictal, and as needed lorazepam for anxiety-would suggest using methocarbamol on a as needed basis only and not scheduled. (wean off ativan and valium slowly), has maryjane on cymbalta for pain since 2012 with minimal benefit- consider a taper off that as well. Given her hx of bipolar disorder, CYmbalta may be the wrong med for her.   Opioids:  Oxycodone/Acetaminophen 5/325 Q6h PRN  3)Non-medication interventions- Ice, Heat, physical therapy, Add OT lymphedema wraps  4)Constipation Prophylaxis- senna and miralax    5) Follow up - Opioid prescriber has been Ananya Mclean  -Discharge Recommendations - We recommend prescribing the following at the time of discharge: it is recommended she seek consult from her previous pain clinic ([facility]).     Who presented with generalized weakness and a fall at TCU.     History of Present Illness (HPI):       Tonya Yang is a 81 year old old female no cause has been identified for the fall other than polypharmacy.  She did not sustain any fractures during the  "fall.  She states in general she is upset with the care that she received at [facility] and I question if this has something to do with her admission.  Her chronic pain has been present for many many years.  She states over the last 7 years she has continued to feel worse and worse.  She states that few months ago she was told that she will, \"become a cripple\".  She states she is hurting all over and it is hard to locate any spot of her body that does not hurt.  She reports a neuropathic tingling in the lower extremities.  She is extremely frustrated at all of the things that she has tried for her pain.  She has tried physical therapy both land and water.  She has tried epidural steroid injections.  She states she has had so many epidural steroid injections she, \"lost count\".  She has tried Toradol.  She has tried Percocet.  No benefit really with gabapentin.  She states Percocet no longer really works either.  Current pain is 10/10 and it has been for some time.  Her goal is to live independently in her apartment and she turns 82 this September..  She denies history of addiction.  She states she keeps her medications safe at home and has never had problems with diversion.  She does share that there was a stressful, \"family event\" that significantly increase her pain.  She will however not sure the details of this family issue with me.    Discussed multimodal interventions, interventions other than systemic pharmacologic treatments for chronic pain including: psych eval.     Review of medical record/Summary of labs and care everywhere.  Reviewed notes from [facility].  Also reviewed PCP notes from 2012.  Reviewed spine notes.    Past pain treatments have included pain clinic-neuro, tried Percocet, tried gabapentin, tried Robaxin, tried Valium, tried Toradol.    Last UA: can order today       [place] -pulled from system on 04/09/22. Last refill on 4/4. This indicated ongoing opioid use. 6 total number of prescribing " "providers noted.       Reviewed the following with Mara, \"An intrathecal pain pump implant is a way to relieve some kinds of long-term (chronic) pain or cancer pain. It sends pain medicine through a thin, flexible tube. The tube is inserted into the space around the spinal cord. The area between the spinal cord and the tissue (membrane) covering the cord is called the intrathecal space. This space contains a liquid called cerebrospinal fluid (CSF).  The tube is connected to a small, round pump. Both are implanted under your skin in a minor surgery. A small electronic device controls the pump. The device stays outside your body. The pump contains medicine and sends it through the tube into the CSF. The medicine reaches nerves along the spine. It helps prevent them from sending pain signals to the brain.\"     Risks of an intrathecal pain pump implant    Infection    Small growth of tissue near the catheter (granuloma)    Mistakes in programming the device that may make the medicine dose too high or too low    Tear in the catheter that stops medicine from getting to nerves    Side effects of opioid or other medicine    Damage to the nerves on the spine    Leaking of cerebrospinal fluid (CSF) that causes headache and other symptoms    A pocket of CSF under the skin (hygroma)    A pocket of other fluid under the skin (seroma)    Changes in endocrine function    Need for higher doses of medicine over time      Medical History   has a past medical history of Abdominal pain, Anxiety, Arthritis, Asthma, Bipolar 1 disorder (H), Chronic pain, Cochlear hydrops (1988), COPD (chronic obstructive pulmonary disease) (H), Depression, Diabetes mellitus (H), Dyspepsia, GERD (gastroesophageal reflux disease), Hard of hearing, Hepatic abscess, Hyperlipidemia, Hypertension, Irritable bowel syndrome, Meniere disease, Neuropathy, Noninfectious ileitis, Peritoneal abscess (H), Spinal stenosis of lumbar region, Steroid long-term use, " Vaginitis, atrophic, postmenopausal, and Vitamin D deficiency.    She has no past medical history of Blood clotting disorder (H).       Surgical History   has a past surgical history that includes Rectal surgery; Foot surgery; picc insertion (8/28/2013); GI surgery; orthopedic surgery; vascular surgery; Laparoscopic hepatectomy partial (11/4/2013); cataract iol, rt/lt (Left, 7/2013); and IR Lumbar Sacral Transfor Injection Bilateral (Bilateral, 3/11/2022).     Allergies     Allergies   Allergen Reactions     Propofol Nausea and Vomiting     Shellfish Allergy      Other reaction(s): Dizziness        Current Home Medications   Prior to Admission medications    Medication Sig Start Date End Date Taking? Authorizing Provider   acetaminophen (TYLENOL) 325 MG tablet Take 1-2 tablets (325-650 mg) by mouth every 6 hours as needed for mild pain 3/12/22  Yes Venita Cano APRN CNP   amLODIPine (NORVASC) 10 MG tablet Take 1 tablet (10 mg) by mouth daily 3/13/22  Yes Hayder Mishra MD   atorvastatin (LIPITOR) 10 MG tablet Take 10 mg by mouth At Bedtime   Yes Unknown, Entered By History   Capsaicin-Menthol (SALONPAS PAIN REL GEL-PTCH HOT) 0.025-1.25 % PTCH Externally apply 2 patches topically daily Place 1 patch on each shoulder-- on in AM, off in PM   Yes Unknown, Entered By History   carvedilol (COREG) 3.125 MG tablet Take 1 tablet (3.125 mg) by mouth 2 times daily (with meals) 3/12/22  Yes Hayder Mishra MD   cyanocobalamin 1000 MCG SUBL Place 1,000 mcg under the tongue daily   Yes Unknown, Entered By History   diazepam (VALIUM) 1 MG/ML solution Take 0.5 mg by mouth every 12 hours   Yes Unknown, Entered By History   DULoxetine (CYMBALTA) 60 MG EC capsule TAKE 1 CAPSULE (60 MG) BY MOUTH DAILY 11/17/17  Yes Bethanie Finnegan MD   fluconazole (DIFLUCAN) 150 MG tablet Take 150 mg by mouth See Admin Instructions One dose prn yeast infection    Yes Unknown, Entered By History   furosemide (LASIX) 20  MG tablet Take 20 mg by mouth daily 4/4/22  Yes Osmin Omalley APRN CNP   gabapentin (NEURONTIN) 100 MG capsule Take 2 capsules (200 mg) by mouth 2 times daily 3/12/22  Yes Hayder Mishra MD   glipiZIDE (GLUCOTROL) 10 MG tablet Take 1 tablet (10 mg) by mouth 2 times daily (before meals) 3/12/22  Yes Hayder Mishra MD   guaiFENesin-dextromethorphan (ROBITUSSIN DM) 100-10 MG/5ML syrup Take 10 mLs by mouth every 4 hours as needed for cough or congestion (productive cough) 3/12/22  Yes Hayder Mishra MD   JARDIANCE 25 MG TABS tablet Take 25 mg by mouth daily  7/6/21  Yes Reported, Patient   lamoTRIgine (LAMICTAL) 100 MG tablet Take 100 mg by mouth 2 times daily   Yes Unknown, Entered By History   lisinopril (ZESTRIL) 5 MG tablet Take 5 mg by mouth daily 4/1/22  Yes Osmin Omalley APRN CNP   meloxicam (MOBIC) 7.5 MG tablet Take 15 mg by mouth daily  4/1/22  Yes Osmin Omalley APRN CNP   metFORMIN (GLUCOPHAGE) 500 MG tablet Take 500 mg by mouth 2 times daily (with meals)   Yes Unknown, Entered By History   methocarbamol (ROBAXIN) 500 MG tablet Take 500 mg by mouth 4 times daily   Yes Reported, Patient   metoclopramide (REGLAN) 10 MG tablet Take 10 mg by mouth 3 times daily as needed Before meals as needed.   Yes Unknown, Entered By History   mirabegron (MYRBETRIQ) 25 MG 24 hr tablet Take 25 mg by mouth At Bedtime  4/4/22  Yes Osmin Omalley APRN CNP   nystatin (MYCOSTATIN) 135840 UNIT/GM external powder Apply topically 2 times daily as needed Breast rash 5/31/21  Yes Reported, Patient   omeprazole (PRILOSEC) 20 MG DR capsule Take 20 mg by mouth daily    Yes Unknown, Entered By History   oxyCODONE-acetaminophen (PERCOCET) 5-325 MG tablet Take 1.5 tablets by mouth every 4 hours as needed for severe pain   Yes Unknown, Entered By History   oxyCODONE-acetaminophen (PERCOCET) 5-325 MG tablet Take 1.5 tablets by mouth every 8 hours  4/4/22  Yes Osmin Omalley,  APRN CNP   predniSONE (DELTASONE) 1 MG tablet Take 6 mg by mouth daily    Yes Unknown, Entered By History   senna (SENOKOT) 8.6 MG tablet Take 2 tablets by mouth 2 times daily as needed for constipation   Yes Unknown, Entered By History   triamcinolone (KENALOG) 0.1 % external cream Apply topically 2 times daily as needed    Yes Reported, Patient   triamterene-HCTZ (MAXZIDE-25) 37.5-25 MG tablet Take 1 tablet by mouth daily   Yes Unknown, Entered By History   vitamin D3 (CHOLECALCIFEROL) 50 mcg (2000 units) tablet Take 3 tablets by mouth daily   Yes Unknown, Entered By History   blood glucose (ACCU-CHEK FASTCLIX) lancing device FOR TESTING ONCE DAILY. DX  E11.9 TYPE 2 DIABETES 9/2/20   Reported, Patient   blood glucose (CONTOUR TEST) test strip TESTING EVERY DAY DX  E11.9 11/23/20   Reported, Patient   CONTOUR NEXT TEST test strip TESTING EVERY DAY DX  E11.9 6/24/21   Reported, Patient   order for DME Equipment being ordered: wrist brace 5/31/20   Leah Feliz PA-C          Social History  Reviewed, and she  reports that she quit smoking about 34 years ago. Her smoking use included cigarettes. She has quit using smokeless tobacco. She reports previous alcohol use. She reports that she does not use drugs.      Family History- Reviewed care everywhere to find family history- Nothing relevant to pain consult    Reviewed, and family history includes Cerebrovascular Disease in her mother; Heart Disease in her brother, father, and mother.    Review of Systems  Complete ROS reviewed, unless noted  , all other systems reviewed (with patient) and all others found to be negative.         Objective:     Vitals:  B/P: 177/108, T: 98.1, P: 85, R: 16     Weight:   209 lbs 8 oz  Body mass index is 34.86 kg/m .      Physical Exam:     General Appearance:  Alert, cooperative, no distress, appears stated age  Patient is laying in bed    Head:  Normocephalic, without obvious abnormality, atraumatic   Eyes:  Pupils are  reactive    ENT/Throat: Lips normal    Lymph/Neck: Supple, symmetrical, trachea midline, no adenopathy, thyroid: not enlarged, symmetric    Lungs:   Clear to auscultation bilaterally, respirations unlabored   Chest Wall:  No tenderness or deformity   Cardiovascular/Heart:  Regular rate and rhythm, S1, S2 normal,no murmur, rub or gallop.     Abdomen:   Soft, non-tender, bowel sounds active all four quadrants,  no masses, no organomegaly  Candidiasis    Musculoskeletal: Extremities dry and edematous    Skin: Skin color pale,.  Of ecchymosis noted in the medial aspect of the right arm   Neurologic: Alert and oriented X 3, Moves all 4 extremities       Psych: Affect is labile, tangential  Grooming is adequate      Labs Reviewed Personally By Myself    Sodium   Date Value Ref Range Status   04/08/2022 145 136 - 145 mmol/L Final   10/24/2017 139 133 - 144 mmol/L Final     Potassium   Date Value Ref Range Status   04/08/2022 3.6 3.5 - 5.0 mmol/L Final   10/24/2017 3.9 3.4 - 5.3 mmol/L Final     Chloride   Date Value Ref Range Status   04/08/2022 101 98 - 107 mmol/L Final   10/24/2017 101 94 - 109 mmol/L Final     Carbon Dioxide   Date Value Ref Range Status   10/24/2017 28 20 - 32 mmol/L Final     Carbon Dioxide (CO2)   Date Value Ref Range Status   04/08/2022 30 22 - 31 mmol/L Final     Anion Gap   Date Value Ref Range Status   04/08/2022 14 5 - 18 mmol/L Final   10/24/2017 10 3 - 14 mmol/L Final     Glucose   Date Value Ref Range Status   04/08/2022 93 70 - 125 mg/dL Final   10/24/2017 116 (H) 70 - 99 mg/dL Final     GLUCOSE BY METER POCT   Date Value Ref Range Status   04/09/2022 82 70 - 99 mg/dL Final     Urea Nitrogen   Date Value Ref Range Status   04/08/2022 20 8 - 28 mg/dL Final   10/24/2017 18 7 - 30 mg/dL Final     Creatinine   Date Value Ref Range Status   04/08/2022 0.84 0.60 - 1.10 mg/dL Final   10/24/2017 0.84 0.52 - 1.04 mg/dL Final     GFR Estimate   Date Value Ref Range Status   04/08/2022 70 >60  mL/min/1.73m2 Final     Comment:     Effective December 21, 2021 eGFRcr in adults is calculated using the 2021 CKD-EPI creatinine equation which includes age and gender (Teena et al., NEJ, DOI: 10.1056/OIQJha1887721)   12/29/2020 >60 >60 mL/min/1.73m2 Final   10/24/2017 66 >60 mL/min/1.7m2 Final     Comment:     Non  GFR Calc     Calcium   Date Value Ref Range Status   04/08/2022 9.3 8.5 - 10.5 mg/dL Final   10/24/2017 9.5 8.5 - 10.1 mg/dL Final             Total time spent 145 minutes with greater than 50% in consultation, education and coordination of care.     Also discussed with RN, called [facility], referral placed, called son.  Extensive conference with patient including education. Called Neurosurgery- discussed with Tawnya. Discussed with Dr. Sutton.     Thank you for this consultation.          Jo Peres APRN, CNS-BC, CNP, ACHPN  Acute Care Pain Management Program Hour 7a-1700  [facility] ([place], [place], [place])   Page via [facility]- Click HERE to page Jo or call [phone]

## 2022-04-09 NOTE — PLAN OF CARE
Problem: Plan of Care - These are the overarching goals to be used throughout the patient stay.    Goal: Plan of Care Review/Shift Note  Description: The Plan of Care Review/Shift note should be completed every shift.  The Outcome Evaluation is a brief statement about your assessment that the patient is improving, declining, or no change.  This information will be displayed automatically on your shift note.  Outcome: Ongoing, Progressing            VSS. Pt reports generalized pain. Capsaicin cream applied to neck and shoulders. Scheduled Robaxin given. Voiding. BM X 2. A X 1 using a walker. Slightly unsteady. Edema +2 present to BLE. Alarms in place.

## 2022-04-09 NOTE — UTILIZATION REVIEW
Concurrent stay review; Secondary Review Determination     Under the authority of the Utilization Management Committee, the utilization review process indicated a secondary review on Tonya Yang.  The review outcome is based on review of the medical records, discussions with staff, and applying clinical experience noted on the date of the review.        (x) Observation Status Appropriate - Concurrent stay review    RATIONALE FOR DETERMINATION   81 yr old female with HTN, DM, neuropathy, chronic LBP with recent hospital stay for pneumonia/sepsis.  Presented back on 4/7 for weakness and generalized pain with fall at TCU.  Planning on placement again at TCU. Suspecting some polypharmacy affecting her strength.  Pain team considering pain pump consultation with pain clinic as outpatient.       Patient is clinically improving and there is no clear indication to change patient's status to inpatient. The severity of illness, intensity of service provided, expected LOS and risk for adverse outcome make the care appropriate for observation.    The information on this document is developed by the utilization review team in order for the business office to ensure compliance.  This only denotes the appropriateness of proper admission status and does not reflect the quality of care rendered.         The definitions of Inpatient Status and Observation Status used in making the determination above are those provided in the CMS Coverage Manual, Chapter 1 and Chapter 6, section 70.4.      Sincerely,   Yudelka Romero MD  Utilization Review  Physician Advisor  [facility]

## 2022-04-09 NOTE — PROGRESS NOTES
IV was removed due to redness, swelling, and itching. Cold applied to help with swelling and itching. Patient currently sleeping, no more  Complaints of itching at this time.    Ericka Crowder RN

## 2022-04-09 NOTE — PROGRESS NOTES
[facility] Medicine PROGRESS NOTE      Identification/Summary: Tonya Yang is a 81 year old old female Past medical history significant for hypertension, diabetes mellitus type 2, neuropathy, polyarthralgia, chronic low back pain, severe spinal stenosis recently hospitalized at [facility] 3/7-3/13 for pneumonia, severe sepsis, acute on chronic low back pain received steroid injection discharged to TCU presented to the ED with complaints of generalized weakness, fall at TCU, who was admitted on 4/7/2022 for TCU placement. Care management following.  She had worsening lower extremity edema.  She received IV Lasix x1 on the day of admission we will give another dose today.    Assessment and Plan:  Generalized weakness   fall  PT OT eval recommending TCU  Urinalysis is abnormal, no symptoms  Await urine culture     Chronic lower extremity edema  Improved.  s/p IV Lasix 4/7, give another dose today  Resume home diuretic tomorrow  Increase home lasix dose to 40mg from 20mg     Constipation  Schedule bowel meds  Had 1 small bowel movement yesterday.    Tinea groin  Miconazole powder     Chronic low back pain  Polypharmacy  Continue home medications   pain team consulted.  She has generalized pain.    Hypertension  Resume antihypertensives  Diabetes mellitus type 2  Continue oral hypoglycemics  Insulin sliding scale as needed  History of enlarged lymph node in chest  Follow-up as outpatient  Anxiety  GERD  DVT prophylaxis: Subcu heparin  Code: Full    Diet: High Consistent Carb (75 g CHO per Meal) Diet  Code Status: Full Code    Anticipated possible discharge in 1-2 days once TCU placement milestones are met.    Interval History/Subjective:  She complains of generalized pain.  No other shortness of breath, chest pain.  Feels her leg swelling is not much better.     Physical Exam/Objective:  Vitals I/O   Vital signs:  Temp: 98.1  F (36.7  C) Temp src: Oral BP: (!) 177/108 Pulse: 85   Resp: 16 SpO2: 91 % O2 Device:  "None (Room air)   Height: 165.1 cm (5' 5\") Weight: 95 kg (209 lb 8 oz)  Estimated body mass index is 34.86 kg/m  as calculated from the following:    Height as of this encounter: 1.651 m (5' 5\").    Weight as of this encounter: 95 kg (209 lb 8 oz). I/O last 3 completed shifts:  In: 360 [P.O.:360]  Out: 1500 [Urine:1500]     Body mass index is 34.86 kg/m .    General Appearance:  Alert, cooperative, no distress   Head:  Normocephalic, atraumatic   Eyes:  PERRL    Throat:  mucosa; moist   Neck: No JVD, thyromegaly   Lungs:   Clear to auscultation bilaterally, respirations unlabored   Chest Wall:  No tenderness or deformity   Heart:  Regular rate and rhythm, S1, S2 normal,no murmur   Abdomen:   Soft, non tender, non distended, bowel sounds present, no guarding or rigidity   Extremities: Trace edema, no joint swelling   Skin: Right elbow, arm bruising same as yesterday   Neurologic: Alert and oriented X 3, Moves all 4 extremities       Medications:   Personally Reviewed.    amLODIPine  10 mg Oral Daily     atorvastatin  10 mg Oral At Bedtime     capsaicin   Topical TID     carvedilol  3.125 mg Oral BID w/meals     cyanocobalamin  1,000 mcg Sublingual Daily     Diazepam 0.5 mg tablet   Oral Q12H     DULoxetine  60 mg Oral Daily     empagliflozin  25 mg Oral Daily     furosemide  40 mg Intravenous Once     [START ON 4/10/2022] furosemide  20 mg Oral Daily     gabapentin  200 mg Oral BID     glipiZIDE  10 mg Oral BID AC     insulin aspart  1-7 Units Subcutaneous TID AC     insulin aspart  1-5 Units Subcutaneous At Bedtime     lamoTRIgine  100 mg Oral BID     lisinopril  5 mg Oral Daily     meloxicam  15 mg Oral Daily     metFORMIN  500 mg Oral BID w/meals     methocarbamol  500 mg Oral 4x Daily     miconazole   Topical BID     mirabegron  25 mg Oral At Bedtime     pantoprazole  40 mg Oral Daily     polyethylene glycol  17 g Oral Daily     predniSONE  6 mg Oral Daily     senna-docusate  1 tablet Oral BID     " triamterene-HCTZ  1 tablet Oral Daily     vitamin D3  150 mcg Oral Daily       Data reviewed today: I personally reviewed all new medications, labs, imaging/diagnostics reports over the past 24 hours. Pertinent findings include    Labs:  Most Recent 3 CBC's:  Recent Labs   Lab Test 04/07/22  1311 03/11/22  0510 03/08/22  0626   WBC 10.3 8.9 19.5*   HGB 9.7* 8.7* 8.5*   MCV 84 84 81    269 272     Most Recent 3 BMP's:  Recent Labs   Lab Test 04/09/22  0836 04/08/22  2131 04/08/22  1753 04/08/22  0840 04/08/22  0223 04/07/22  1919 04/07/22  1311 04/07/22  0859   NA  --   --   --   --  145  --  141 143   POTASSIUM  --   --   --   --  3.6  --  4.5 3.7   CHLORIDE  --   --   --   --  101  --  99 99   CO2  --   --   --   --  30  --  29 32*   BUN  --   --   --   --  20  --  21 19   CR  --   --   --   --  0.84  --  1.00 0.85   ANIONGAP  --   --   --   --  14  --  13 12   MACY  --   --   --   --  9.3  --  10.0 10.3   GLC 82 120* 120*   < > 93   < > 225* 143*    < > = values in this interval not displayed.     Most Recent 2 LFT's:  Recent Labs   Lab Test 04/07/22  1311 07/19/21  2322   AST 19 28   ALT 19 52*   ALKPHOS 85 159*   BILITOTAL 0.4 0.7     Most Recent 6 glucoses:  Recent Labs   Lab Test 04/09/22  0836 04/08/22  2131 04/08/22  1753 04/08/22  1446 04/08/22  1311 04/08/22  0840   GLC 82 120* 120* 232* 260* 94       Imaging:   No results found for this or any previous visit (from the past 24 hour(s)).      Rebecca Sutton MD  Hospitalist  [facility]

## 2022-04-09 NOTE — PROGRESS NOTES
Called by Jo Peres from pain team to discuss patient's spine pathology and potential contraindications for pain pump insertion as an option for pain control as she is having issues with falls associated with polypharmacy. Patient is not a spine surgical candidate per discussion with Ortho spine and is not interested in any additional injections. She has generalized whole body pain. I do not see any contraindications with review of 3/2022 imaging.     GRACE Fuller-CNP  [facility] Neurosurgery  O: [phone]    CT chest 3/2022  Circumscribed 1.8 x 1.4 x 1.1 cm mass left anterior paravertebral fat posterior to the descending thoracic aorta (series 4 image 81) is unchanged since 03/07/2022 MRI thoracic spine but new compared with 10/01/2013 CT and most likely represents a   lymph node. Recommend 6 month follow-up CT.    MRI thoracic prior to CT   3.  Small T2 hypointense, STIR hyperintense well-circumscribed enhancing lesion along the left anterolateral paravertebral soft tissues measuring 1.2 x 1.1 x 1.4 cm (AP x TV x CC). This finding is nonspecific but could reflect a neurofibroma/schwannoma,   nerve sheath tumor, metastatic lymph node, atypical mediastinal mass or other lesion. Consider dedicated CT chest for further assessment.

## 2022-04-10 ENCOUNTER — APPOINTMENT (OUTPATIENT)
Dept: PHYSICAL THERAPY | Facility: CLINIC | Age: 82
End: 2022-04-10
Payer: MEDICARE

## 2022-04-10 ENCOUNTER — APPOINTMENT (OUTPATIENT)
Dept: OCCUPATIONAL THERAPY | Facility: CLINIC | Age: 82
End: 2022-04-10
Attending: PAIN MEDICINE
Payer: MEDICARE

## 2022-04-10 LAB
BACTERIA UR CULT: NORMAL
GLUCOSE BLDC GLUCOMTR-MCNC: 129 MG/DL (ref 70–99)
GLUCOSE BLDC GLUCOMTR-MCNC: 133 MG/DL (ref 70–99)
GLUCOSE BLDC GLUCOMTR-MCNC: 139 MG/DL (ref 70–99)
GLUCOSE BLDC GLUCOMTR-MCNC: 140 MG/DL (ref 70–99)
GLUCOSE BLDC GLUCOMTR-MCNC: 68 MG/DL (ref 70–99)
IRON SATN MFR SERPL: 12 % (ref 20–50)
IRON SERPL-MCNC: 46 UG/DL (ref 42–175)
TIBC SERPL-MCNC: 385 UG/DL (ref 313–563)
TRANSFERRIN SERPL-MCNC: 308 MG/DL (ref 212–360)

## 2022-04-10 PROCEDURE — 99226 PR SUBSEQUENT OBSERVATION CARE,LEVEL III: CPT | Performed by: FAMILY MEDICINE

## 2022-04-10 PROCEDURE — G0378 HOSPITAL OBSERVATION PER HR: HCPCS

## 2022-04-10 PROCEDURE — 250N000013 HC RX MED GY IP 250 OP 250 PS 637: Performed by: PAIN MEDICINE

## 2022-04-10 PROCEDURE — 97535 SELF CARE MNGMENT TRAINING: CPT | Mod: GO

## 2022-04-10 PROCEDURE — 36415 COLL VENOUS BLD VENIPUNCTURE: CPT | Performed by: FAMILY MEDICINE

## 2022-04-10 PROCEDURE — 97116 GAIT TRAINING THERAPY: CPT | Mod: GP

## 2022-04-10 PROCEDURE — 250N000012 HC RX MED GY IP 250 OP 636 PS 637: Performed by: INTERNAL MEDICINE

## 2022-04-10 PROCEDURE — 82962 GLUCOSE BLOOD TEST: CPT

## 2022-04-10 PROCEDURE — 250N000013 HC RX MED GY IP 250 OP 250 PS 637: Performed by: FAMILY MEDICINE

## 2022-04-10 PROCEDURE — 250N000013 HC RX MED GY IP 250 OP 250 PS 637: Performed by: INTERNAL MEDICINE

## 2022-04-10 PROCEDURE — 96372 THER/PROPH/DIAG INJ SC/IM: CPT | Performed by: FAMILY MEDICINE

## 2022-04-10 PROCEDURE — 99214 OFFICE O/P EST MOD 30 MIN: CPT | Performed by: PAIN MEDICINE

## 2022-04-10 PROCEDURE — 83540 ASSAY OF IRON: CPT | Performed by: FAMILY MEDICINE

## 2022-04-10 PROCEDURE — 250N000011 HC RX IP 250 OP 636: Performed by: FAMILY MEDICINE

## 2022-04-10 RX ORDER — NALOXONE HYDROCHLORIDE 0.4 MG/ML
0.4 INJECTION, SOLUTION INTRAMUSCULAR; INTRAVENOUS; SUBCUTANEOUS
Status: DISCONTINUED | OUTPATIENT
Start: 2022-04-10 | End: 2022-04-15 | Stop reason: HOSPADM

## 2022-04-10 RX ORDER — ACETAMINOPHEN 325 MG/1
975 TABLET ORAL 3 TIMES DAILY
Status: DISCONTINUED | OUTPATIENT
Start: 2022-04-10 | End: 2022-04-15 | Stop reason: HOSPADM

## 2022-04-10 RX ORDER — NALOXONE HYDROCHLORIDE 0.4 MG/ML
0.2 INJECTION, SOLUTION INTRAMUSCULAR; INTRAVENOUS; SUBCUTANEOUS
Status: DISCONTINUED | OUTPATIENT
Start: 2022-04-10 | End: 2022-04-15 | Stop reason: HOSPADM

## 2022-04-10 RX ORDER — HEPARIN SODIUM 5000 [USP'U]/.5ML
5000 INJECTION, SOLUTION INTRAVENOUS; SUBCUTANEOUS EVERY 12 HOURS
Status: DISCONTINUED | OUTPATIENT
Start: 2022-04-10 | End: 2022-04-15 | Stop reason: HOSPADM

## 2022-04-10 RX ORDER — METHOCARBAMOL 500 MG/1
500 TABLET, FILM COATED ORAL EVERY 6 HOURS PRN
Status: DISCONTINUED | OUTPATIENT
Start: 2022-04-10 | End: 2022-04-15

## 2022-04-10 RX ORDER — OXYCODONE HYDROCHLORIDE 5 MG/1
5-10 TABLET ORAL EVERY 6 HOURS PRN
Status: DISCONTINUED | OUTPATIENT
Start: 2022-04-10 | End: 2022-04-15

## 2022-04-10 RX ORDER — LIDOCAINE 4 G/G
1 PATCH TOPICAL
Status: DISCONTINUED | OUTPATIENT
Start: 2022-04-10 | End: 2022-04-15 | Stop reason: SINTOL

## 2022-04-10 RX ADMIN — METFORMIN HYDROCHLORIDE 500 MG: 500 TABLET, FILM COATED ORAL at 17:31

## 2022-04-10 RX ADMIN — CARVEDILOL 3.12 MG: 3.12 TABLET, FILM COATED ORAL at 09:03

## 2022-04-10 RX ADMIN — AMLODIPINE BESYLATE 10 MG: 5 TABLET ORAL at 09:02

## 2022-04-10 RX ADMIN — Medication: at 13:42

## 2022-04-10 RX ADMIN — DULOXETINE HYDROCHLORIDE 60 MG: 30 CAPSULE, DELAYED RELEASE ORAL at 09:03

## 2022-04-10 RX ADMIN — ACETAMINOPHEN 650 MG: 325 TABLET ORAL at 04:31

## 2022-04-10 RX ADMIN — LAMOTRIGINE 100 MG: 100 TABLET ORAL at 22:04

## 2022-04-10 RX ADMIN — METHOCARBAMOL 500 MG: 750 TABLET, FILM COATED ORAL at 13:41

## 2022-04-10 RX ADMIN — PREDNISONE 6 MG: 1 TABLET ORAL at 09:04

## 2022-04-10 RX ADMIN — METHOCARBAMOL 500 MG: 500 TABLET ORAL at 09:08

## 2022-04-10 RX ADMIN — LISINOPRIL 5 MG: 5 TABLET ORAL at 09:02

## 2022-04-10 RX ADMIN — WHITE PETROLATUM: 1.75 OINTMENT TOPICAL at 09:06

## 2022-04-10 RX ADMIN — GLIPIZIDE 10 MG: 10 TABLET ORAL at 17:58

## 2022-04-10 RX ADMIN — HEPARIN SODIUM 5000 UNITS: 5000 INJECTION, SOLUTION INTRAVENOUS; SUBCUTANEOUS at 12:00

## 2022-04-10 RX ADMIN — SENNOSIDES AND DOCUSATE SODIUM 1 TABLET: 50; 8.6 TABLET ORAL at 09:02

## 2022-04-10 RX ADMIN — PANTOPRAZOLE SODIUM 40 MG: 20 TABLET, DELAYED RELEASE ORAL at 08:57

## 2022-04-10 RX ADMIN — MIRABEGRON 25 MG: 25 TABLET, FILM COATED, EXTENDED RELEASE ORAL at 22:07

## 2022-04-10 RX ADMIN — CHOLECALCIFEROL TAB 125 MCG (5000 UNIT) 150 MCG: 125 TAB at 09:01

## 2022-04-10 RX ADMIN — ACETAMINOPHEN 325 MG: 325 TABLET ORAL at 22:05

## 2022-04-10 RX ADMIN — LIDOCAINE: 50 OINTMENT TOPICAL at 09:06

## 2022-04-10 RX ADMIN — TRIAMTERENE AND HYDROCHLOROTHIAZIDE 1 TABLET: 37.5; 25 TABLET ORAL at 09:02

## 2022-04-10 RX ADMIN — LAMOTRIGINE 100 MG: 100 TABLET ORAL at 09:05

## 2022-04-10 RX ADMIN — LIDOCAINE: 50 OINTMENT TOPICAL at 13:43

## 2022-04-10 RX ADMIN — WHITE PETROLATUM: 1.75 OINTMENT TOPICAL at 13:43

## 2022-04-10 RX ADMIN — CARVEDILOL 3.12 MG: 3.12 TABLET, FILM COATED ORAL at 17:31

## 2022-04-10 RX ADMIN — PREGABALIN 25 MG: 25 CAPSULE ORAL at 08:57

## 2022-04-10 RX ADMIN — OXYCODONE HYDROCHLORIDE 10 MG: 5 TABLET ORAL at 17:31

## 2022-04-10 RX ADMIN — MICONAZOLE NITRATE: 20 POWDER TOPICAL at 22:09

## 2022-04-10 RX ADMIN — MICONAZOLE NITRATE: 20 POWDER TOPICAL at 09:05

## 2022-04-10 RX ADMIN — PREGABALIN 25 MG: 25 CAPSULE ORAL at 22:04

## 2022-04-10 RX ADMIN — POLYETHYLENE GLYCOL 3350 17 G: 17 POWDER, FOR SOLUTION ORAL at 09:05

## 2022-04-10 RX ADMIN — FUROSEMIDE 40 MG: 40 TABLET ORAL at 09:02

## 2022-04-10 RX ADMIN — MELOXICAM 15 MG: 7.5 TABLET ORAL at 09:01

## 2022-04-10 RX ADMIN — HEPARIN SODIUM 5000 UNITS: 5000 INJECTION, SOLUTION INTRAVENOUS; SUBCUTANEOUS at 22:44

## 2022-04-10 RX ADMIN — Medication 1000 MCG: at 09:03

## 2022-04-10 RX ADMIN — CAPSAICIN: 0.25 CREAM TOPICAL at 09:07

## 2022-04-10 RX ADMIN — EMPAGLIFLOZIN 25 MG: 25 TABLET, FILM COATED ORAL at 09:05

## 2022-04-10 RX ADMIN — GLIPIZIDE 10 MG: 10 TABLET ORAL at 06:39

## 2022-04-10 RX ADMIN — ATORVASTATIN CALCIUM 10 MG: 10 TABLET, FILM COATED ORAL at 22:04

## 2022-04-10 RX ADMIN — Medication: at 09:07

## 2022-04-10 RX ADMIN — ACETAMINOPHEN 975 MG: 325 TABLET ORAL at 13:44

## 2022-04-10 RX ADMIN — METFORMIN HYDROCHLORIDE 500 MG: 500 TABLET, FILM COATED ORAL at 09:01

## 2022-04-10 NOTE — PROGRESS NOTES
Pt is pleasant but very anxious if there is to much stimulation in the room or talking. Needs to read lips, needs reassurance and straight direction.   Pt blood sugar 68 at start of shift. 122 after juice. Up with assist of 1. VSS.    Legs wrapped bilaterally with short stretch- good DP and PT pulses.     No significant complaints of pain but wanted something for anxiety. Reassured her that meds she got for am will help with her anxiety.

## 2022-04-10 NOTE — PROGRESS NOTES
[facility]      OUTPATIENT OCCUPATIONAL THERAPY  EVALUATION  PLAN OF TREATMENT FOR OUTPATIENT REHABILITATION  (COMPLETE FOR INITIAL CLAIMS ONLY)  Patient's Last Name, First Name, M.I.  YOB: 1940  TreyTonya  JOHN                          Provider's Name  [facility] Medical Record No.  [id number]                               Onset Date:  04/07/22   Start of Care Date:  04/10/22     Type:     ___PT   _X_OT   ___SLP Medical Diagnosis:  fall, weakness; edema                        OT Diagnosis:  decreaesd ind w/ ADLs   Visits from SOC:  1   _________________________________________________________________________________  Plan of Treatment/Functional Goals    Planned Interventions: ADL retraining, bed mobility training, strengthening, transfer training   Goals: See Occupational Therapy Goals on Care Plan in [facility] electronic health record.    Therapy Frequency: Daily  Predicted Duration of Therapy Intervention: 04/12/22  _________________________________________________________________________________    I CERTIFY THE NEED FOR THESE SERVICES FURNISHED UNDER        THIS PLAN OF TREATMENT AND WHILE UNDER MY CARE     (Physician co-signature of this document indicates review and certification of the therapy plan).              Certification date from: 04/10/22, Certification date to: 04/17/22    Referring Physician: Ml López MD            Initial Assessment        See Occupational Therapy evaluation dated 04/10/22 in Epic electronic health record.

## 2022-04-10 NOTE — PROGRESS NOTES
04/10/22 0835   Quick Adds   Quick Adds Edema   General Information   Onset of Illness/Injury or Date of Surgery 04/07/22   Referring Physician Ml López MD   Limitations/Impairments hearing   Edema General Information   Onset of Edema   (ongoing for last 5 weeks)   Affected Body Part(s) Left LE;Right LE   Edema Etiology Unknown   Edema Precaution Comments pt have neuropathy, c/o pins and needles   General Comments/Previous Edema Treatment/Edema Equipment pt very anxious and overwhelmed during session. lots of questions. was provided tamia hose at [facility]   Edema Examination/Assessment   Skin Condition Dryness;Pitting   Scar No   Ulcerations No   Skin Integrity intact, mild dryness, sporadic red spots/blotchy throughout bilat lower legs   Pitting Assessment 3+ over bilat feet, 2+ over bilat lower legs   Clinical Impression   Criteria for Skilled Therapeutic Interventions Met (OT) Yes, treatment indicated   Edema: Patient Presentation Edema   Edema: Planned Interventions Manual lymph drainage;Gradient compression bandaging;Edema exercises;Fit for compression garment;Precautions to prevent infection/exacerbation;Education   Clinical Decision Making Complexity (OT) moderate complexity   Risk & Benefits of therapy have been explained evaluation/treatment results reviewed;patient   OT Discharge Planning   OT Brief overview of current status lymph: continue at next level of care   Therapy Certification   Start of Care Date 04/10/22   Certification date from 04/10/22   Certification date to 04/17/22   Medical Diagnosis fall, weakness; edema   Total Evaluation Time (Minutes)   Total Evaluation Time (Minutes) 10   OT Goals   Therapy Frequency (OT) Daily   OT Predicated Duration/Target Date for Goal Attainment 04/12/22   OT Goals Edema   OT: Edema education to increase ability to manage edema after discharge from the hospital Patient;Verbalize;Hume;signs/symptoms of intolerance   OT: Management of edema bandages  Patient;Verbalize;Max assist;quick wrap   Leigh Ann Mayes, OTR/L

## 2022-04-10 NOTE — PROGRESS NOTES
[facility] ACUTE PAIN SERVICE    ([facility], [facility], [facility])  Daily PAIN Progress Note    Assessment/Plan:  Tonya Yang is a 81 year old female who was admitted on 4/7/2022.  I was asked to see the patient for back pain. Admitted for generalized weakness, fall at TCU without injury and displeased with TCU care. History of HTN, obesity, DMII, polyarthralgia, chronic back pain- on polypharmacy. Pain is the same today. Met with pt and son. Son is requesting more opioids- encouraged outpatient evaluation of chronic pain.     PLAN:   1) weakness and fall likely due to polypharmacy.  Would not recommend diazepam with Percocet given risk for overdose. Discussed with Neurosurgery- they agree with intrathecal pain pump evaluation. Continue to up titrate lyrica. Wean off valium. Will call [facility] pain clinic tomorrow vs [facility] to coordinate plan of care. I have a meeting with the patient and son tomorrow at 11am.   2)Multimodal Medication Therapy  Topical:  Lidocaine patch 4% and Diclofenac ointment  Adjuvants:  Tyenol 1G TID and Gabapentin . Hx of CKD avoid NSAIDs CrtCl is 61.9mL/min  would aviod meloxicam, stop gabapentin and replace with lyrica  Antidepressants/anxiolytics: receives duloxetine, Lamictal, and as needed lorazepam for anxiety-would suggest using methocarbamol on a as needed basis only and not scheduled. (wean off ativan and valium slowly), has maryjane on cymbalta for pain since 2012 with minimal benefit- consider a taper off that as well. Given her hx of bipolar disorder, CYmbalta may be the wrong med for her.   Opioids:  Oxycodone/Acetaminophen 5/325 Q6h PRN  3)Non-medication interventions- Ice, Heat, physical therapy, Add OT lymphedema wraps  4)Constipation Prophylaxis- senna and miralax    5) Follow up - Opioid prescriber has been Ananya Mclean  -Discharge Recommendations - We recommend prescribing the following at the time of discharge: it is recommended she seek  consult from her previous pain clinic ([facility]).   Social work consult for outpatient mental health         Subjective:  Discussed with patient. She is having her legs wrapped.     Discussed with son Davie. He asks about adding morphine to her pain regimen. I advised that changes to chronic opioids be made in the outpatient setting. We discussed the intrathecal pain pump- but again he questions going up on opioids. We talked about polypharmacy. Minimizing valium and maybe weaning cymbalta.             <principal problem not specified>   Patient Active Problem List   Diagnosis     Postmenopausal atrophic vaginitis     IBS (irritable bowel syndrome)     Meniere's disease (cochlear hydrops) - on prednisone and triamterene hydrochlorothiazide     GERD (gastroesophageal reflux disease)     CKD (chronic kidney disease) stage 3, GFR 30-59 ml/min (H)     Health Care Home     Iron deficiency anemia due to chronic blood loss     Deafness     Abscess,& FB in liver in 4-10 & 4-11 w recurrent pain in RUQ  in 7-13 s/po lap I&D and unroofing sans finding of an FB  in 11-13      Elevated liver enzymes since 12-13 liver abscess I&D     Temporomandibular joint disorder     Bipolar disorder, in full remission, most recent episode depressed (H)     Diarrhea r/o exacerbated by metformin -since 30's     Baker's cyst, left     Lower urinary tract infectious disease     Left-sided low back pain with left-sided sciatica since 1993 w reoccurrence 6-15      Meniere's disease (cochlear hydrops), LT     Steroid dependent for cochlear hydrops  since 1985      Bilateral back pain     Primary insomnia     Mixed hyperlipidemia     Localized edema-L>R lat malleolus     Microalbuminuria     Leukocytosis w Lt shift      Long-term (current) use of anticoagulants [Z79.01]     Essential hypertension     Ankle edema     Sensorineural hearing loss, bilateral     Chronic pain syndrome-issue of broken controlled sub agreement      Bilateral leg edema worsening w  her increasing inactivity      Muscle weakness (generalized) worse since 11-16 w drowsiness     Type 2 diabetes mellitus with diabetic nephropathy, without long-term current use of insulin (H)     Class 2 obesity due to excess calories with serious comorbidity and body mass index (BMI) of 37.0 to 37.9 in adult     Bilateral deafness     Confusion     Hyperglycemia     Acute renal failure, unspecified acute renal failure type (H)     Pneumonia of right lower lobe due to infectious organism     Severe sepsis (H)     Type 2 diabetes mellitus with hyperglycemia (H)     Glycosuria     Paravertebral mass     Abnormal finding on CT scan     Bilateral hearing loss, unspecified hearing loss type     Spasm of back muscles     Neural foraminal stenosis of cervical spine     Multilevel spondylosis     Anxiety and depression     Slow transit constipation     Weakness     Fall, initial encounter        History   Drug Use No     Comment: used [employer] [place] in the past         Tobacco Use      Smoking status: Former Smoker        Types: Cigarettes        Quit date: 11/15/1987        Years since quittin.4      Smokeless tobacco: Former User          amLODIPine  10 mg Oral Daily     atorvastatin  10 mg Oral At Bedtime     capsaicin   Topical TID     carvedilol  3.125 mg Oral BID w/meals     cyanocobalamin  1,000 mcg Sublingual Daily     DULoxetine  60 mg Oral Daily     emollient   Topical TID     empagliflozin  25 mg Oral Daily     furosemide  40 mg Intravenous Once     furosemide  40 mg Oral Daily     glipiZIDE  10 mg Oral BID AC     insulin aspart  1-7 Units Subcutaneous TID AC     lamoTRIgine  100 mg Oral BID     lidocaine   Topical TID     lisinopril  5 mg Oral Daily     meloxicam  15 mg Oral Daily     metFORMIN  500 mg Oral BID w/meals     methocarbamol  500 mg Oral 4x Daily     miconazole   Topical BID     mineral oil-hydrophilic petrolatum   Topical TID     mirabegron  25 mg Oral At Bedtime     pantoprazole  40 mg Oral Daily  "    polyethylene glycol  17 g Oral Daily     predniSONE  6 mg Oral Daily     pregabalin  25 mg Oral BID     senna-docusate  1 tablet Oral BID     triamterene-HCTZ  1 tablet Oral Daily     vitamin D3  150 mcg Oral Daily       Objective:  Vital signs in last 24 hours:  B/P: 152/69, T: 98, P: 75, R: 18   Blood pressure (!) 152/69, pulse 75, temperature 98  F (36.7  C), temperature source Oral, resp. rate 18, height 1.651 m (5' 5\"), weight 95 kg (209 lb 8 oz), SpO2 92 %, not currently breastfeeding.      Weight:   Wt Readings from Last 2 Encounters:   04/07/22 95 kg (209 lb 8 oz)   04/04/22 88.4 kg (194 lb 12.8 oz)           Intake/Output:    Intake/Output Summary (Last 24 hours) at 4/10/2022 0934  Last data filed at 4/10/2022 0416  Gross per 24 hour   Intake 360 ml   Output 2125 ml   Net -1765 ml        Review of Systems:   As per subjective, all others negative.    Physical Exam:     General Appearance:  Alert, agitated    Head:  Normocephalic, without obvious abnormality, atraumatic   Eyes:  PERRL, conjunctiva/corneas clear, EOM's intact   ENT/Throat: Lips normal     Lymph/Neck: Supple, symmetrical, trachea midline, no adenopathy, thyroid: not enlarged, symmetric    Lungs:     respirations unlabored   Chest Wall:  No tenderness or deformity   Cardiovascular/Heart:  No sob    Abdomen:   Soft, non-tende    Musculoskeletal: Extremities being wrapped - some edema    Skin: Skin color pale, ecchymosis noted right arm    Neurologic: Alert and oriented X 3, Moves all 4 extremities   Sitting up in the chair        Psych: Affect is labile               Imaging:  Personally Reviewed.       Lab Results:  Personally Reviewed.   Last Comprehensive Metabolic Panel:  Sodium   Date Value Ref Range Status   04/08/2022 145 136 - 145 mmol/L Final   10/24/2017 139 133 - 144 mmol/L Final     Potassium   Date Value Ref Range Status   04/08/2022 3.6 3.5 - 5.0 mmol/L Final   10/24/2017 3.9 3.4 - 5.3 mmol/L Final     Chloride   Date Value Ref " Range Status   04/08/2022 101 98 - 107 mmol/L Final   10/24/2017 101 94 - 109 mmol/L Final     Carbon Dioxide   Date Value Ref Range Status   10/24/2017 28 20 - 32 mmol/L Final     Carbon Dioxide (CO2)   Date Value Ref Range Status   04/08/2022 30 22 - 31 mmol/L Final     Anion Gap   Date Value Ref Range Status   04/08/2022 14 5 - 18 mmol/L Final   10/24/2017 10 3 - 14 mmol/L Final     Glucose   Date Value Ref Range Status   04/08/2022 93 70 - 125 mg/dL Final   10/24/2017 116 (H) 70 - 99 mg/dL Final     GLUCOSE BY METER POCT   Date Value Ref Range Status   04/10/2022 68 (L) 70 - 99 mg/dL Final     Urea Nitrogen   Date Value Ref Range Status   04/08/2022 20 8 - 28 mg/dL Final   10/24/2017 18 7 - 30 mg/dL Final     Creatinine   Date Value Ref Range Status   04/08/2022 0.84 0.60 - 1.10 mg/dL Final   10/24/2017 0.84 0.52 - 1.04 mg/dL Final     GFR Estimate   Date Value Ref Range Status   04/08/2022 70 >60 mL/min/1.73m2 Final     Comment:     Effective December 21, 2021 eGFRcr in adults is calculated using the 2021 CKD-EPI creatinine equation which includes age and gender (Teena et al., NEJ, DOI: 10.1056/NOHBgt3636144)   12/29/2020 >60 >60 mL/min/1.73m2 Final   10/24/2017 66 >60 mL/min/1.7m2 Final     Comment:     Non  GFR Calc     Calcium   Date Value Ref Range Status   04/08/2022 9.3 8.5 - 10.5 mg/dL Final   10/24/2017 9.5 8.5 - 10.1 mg/dL Final        UA: No results found for: UAMP, UBARB, BENZODIAZEUR, UCANN, UCOC, OPIT, UPCP         Total time spent 37 minutes with greater than 50% in consultation, education and coordination of care.     Also discussed with RN, conferenced with son for 21 minutes. Spoke with patient in [place]. Met with patient who is very upset.         Jo Peres APRN, CNS-BC, CNP, ACHPN  Acute Care Pain Management Program Hour 7a-1700  [facility] (WW, Joes, JNs)   Page via [employer]- Click HERE to page Jo or call [phone]

## 2022-04-10 NOTE — PROGRESS NOTES
[facility] MEDICINE PROGRESS NOTE      Identification/Summary: Tonya Yang is a 81 year old female with a past medical history of  hypertension, diabetes mellitus type 2, neuropathy, polyarthralgia, chronic low back pain, severe spinal stenosis recently hospitalized at [facility] 3/7-3/13 for pneumonia, severe sepsis, acute on chronic low back pain received steroid injection discharged to TCU presented to the ED with complaints of generalized weakness, fall at TCU, who was admitted on 4/7/2022 for TCU placement. Care management following.  Bilateral pedal edema are improved with IV Lasix x 2.  Had episode of hypoglycemia.  Bedtime insulin is discontinued.  On multiple antidiabetic medications including Jardiance 25 mg daily, glipizide 10 mg twice a day, Metformin 500 mg twice a day.  Evaluated by acute pain team for chronic back pain.     Assessment and Plan:  Generalized weakness   Recent fall  PT /OT evaluation, recommended TCU placement    Chronic low back pain  Polypharmacy  Evaluated by acute pain team  Recommended follow-up with chronic pain clinic  Pain control with Tylenol 975 mg 3 times a day, meloxicam 15 mg daily, oxycodone 5 to 10 mg every 6 hours as needed, local lidocaine patch  Robaxin 500 mg every 6 hours as needed  Lyrica 25 mg twice a day  Cymbalta 60 mg daily    Chronic lower extremity edema  Status post IV Lasix 40 mg x 2  Resume p.o. Lasix 40 mg daily, higher dose    DM2  Episodes of hypoglycemia  On multiple hypoglycemic agents  Bedtime insulin is discontinued  Jardiance 25 mg daily, glipizide 10 mg twice a day, Metformin 500 mg twice a day, insulin sliding scale with meals    Essential hypertension  Norvasc 10 mg daily  Carvedilol 3.125 mg twice a day  Lisinopril 5 mg daily  Maxide 37.5/25 mg daily    Dyslipidemia  Lipitor 10 mg daily    Anxiety, depression, bipolar disorder  Has acute anxiety due to recent illness  Lamictal 100 mg twice a day    GERD, resume  PPI    Tenia groin  Miconazole powder    DVT prophylaxis  Subcu heparin 5000 unit twice a day  Code full  Barrier to discharge  Routing for more clinical improvement.  Anticipated discharge to TCU in 1 day      Interval History/Subjective:  Resting comfortably.  Had episode of hypoglycemia in the morning.  Anxious due to recent illness.  Evaluated by acute pain team for chronic low back pain.  Bilateral leg edema are improved.    Review of systems  Rest of the review of system are negative except HPI.  Denies headache, chest pain, breathing difficulty, palpitation, nausea, vomiting, abdominal pain or urinary symptoms.    Physical Exam/Objective:  Temp:  [97.8  F (36.6  C)-98.4  F (36.9  C)] 98  F (36.7  C)  Pulse:  [75-87] 75  Resp:  [16-18] 18  BP: (126-152)/(57-70) 152/69  SpO2:  [91 %-97 %] 92 %  Body mass index is 34.86 kg/m .     GENERAL:  Alert, appears comfortable, in no acute distress, appears stated age, anxious   HEAD:  Normocephalic, without obvious abnormality, atraumatic   EYES:  PERRL, conjunctiva  clear,   EOM's intact   NOSE: Nares normal,  mucosa normal, no drainage   THROAT: Lips, mucosa,   gums normal, mouth moist   NECK: Supple, symmetrical, trachea midline   BACK:   Symmetric, no curvature, ROM normal   LUNGS:   Clear to auscultation bilaterally, no rales, rhonchi, or wheezing, symmetric chest rise on inhalation, respirations unlabored   CHEST WALL:  No tenderness or deformity   HEART:  Regular rate and rhythm, S1 and S2 normal, no murmur    ABDOMEN:   Soft, non-tender, bowel sounds active  s, no masses, no organomegaly    EXTREMITIES: 1+ bilateral pedal edema    SKIN: No rashes   NEURO: Alert, oriented x3, moves all four extremities freely   PSYCH: Cooperative, behavior is appropriate      Data reviewed today: I personally reviewed all new medications, labs, imaging/diagnostics reports over the past 24 hours. Pertinent findings include:    Imaging:   Lumber spine MRI  1.  Advanced multilevel  degenerative spondylolisthesis of the lumbar spine, as above.  2.  At L4-L5 there is severe central spinal canal stenosis with severe bilateral subarticular zone stenosis, moderate left neural foraminal stenosis and mild right neural foraminal stenosis.  3.  At L5-S1 there is mild central spinal canal stenosis with severe bilateral neural foraminal stenosis and compression of the bilateral exiting cauda equina nerve roots, left greater than right.  4.  At L3-L4 there is severe central spinal canal stenosis with mild left and mild to moderate right neural foraminal stenosis.  5.  At L2-L3 there is moderate central spinal canal stenosis with mild right neural foraminal stenosis.   6.  Multilevel fluid in the facet joints, as described above, is favored to be degenerative in etiology, however, an infectious/inflammatory facet synovitis could have a similar appearance.     Labs:  Most Recent 3 CBC's:Recent Labs   Lab Test 04/07/22  1311 03/11/22  0510 03/08/22  0626   WBC 10.3 8.9 19.5*   HGB 9.7* 8.7* 8.5*   MCV 84 84 81    269 272     Most Recent 3 BMP's:Recent Labs   Lab Test 04/10/22  0954 04/10/22  0806 04/09/22  2137 04/08/22  0840 04/08/22  0223 04/07/22  1919 04/07/22  1311 04/07/22  0859   NA  --   --   --   --  145  --  141 143   POTASSIUM  --   --   --   --  3.6  --  4.5 3.7   CHLORIDE  --   --   --   --  101  --  99 99   CO2  --   --   --   --  30  --  29 32*   BUN  --   --   --   --  20  --  21 19   CR  --   --   --   --  0.84  --  1.00 0.85   ANIONGAP  --   --   --   --  14  --  13 12   MACY  --   --   --   --  9.3  --  10.0 10.3   * 68* 103*   < > 93   < > 225* 143*    < > = values in this interval not displayed.       Medications:   Personally Reviewed.  Medications       acetaminophen  975 mg Oral TID     amLODIPine  10 mg Oral Daily     atorvastatin  10 mg Oral At Bedtime     capsaicin   Topical TID     carvedilol  3.125 mg Oral BID w/meals     cyanocobalamin  1,000 mcg Sublingual Daily      DULoxetine  60 mg Oral Daily     emollient   Topical TID     empagliflozin  25 mg Oral Daily     furosemide  40 mg Intravenous Once     furosemide  40 mg Oral Daily     glipiZIDE  10 mg Oral BID AC     insulin aspart  1-7 Units Subcutaneous TID AC     lamoTRIgine  100 mg Oral BID     lidocaine   Topical TID     lisinopril  5 mg Oral Daily     meloxicam  15 mg Oral Daily     metFORMIN  500 mg Oral BID w/meals     miconazole   Topical BID     mineral oil-hydrophilic petrolatum   Topical TID     mirabegron  25 mg Oral At Bedtime     pantoprazole  40 mg Oral Daily     polyethylene glycol  17 g Oral Daily     predniSONE  6 mg Oral Daily     pregabalin  25 mg Oral BID     senna-docusate  1 tablet Oral BID     triamterene-HCTZ  1 tablet Oral Daily     vitamin D3  150 mcg Oral Daily

## 2022-04-10 NOTE — PROGRESS NOTES
PRIMARY DIAGNOSIS: GENERALIZED WEAKNESS    OUTPATIENT/OBSERVATION GOALS TO BE MET BEFORE DISCHARGE  1. Orthostatic performed: N/A    2. Tolerating PO medications: Yes    3. Return to near baseline physical activity: Yes    4. Cleared for discharge by consultants (if involved): No     Discharge Planner Nurse   Safe discharge environment identified: No  Barriers to discharge: Yes - pending TCU discharge       Entered by: Ericka Crowder 04/09/2022 10:55 PM        Son Mannie is requesting to be contact by NP Jo Peres to discuss outpatient options for pain. Care note left. VSS. Ambulated A1 from chair to bed. Pain medication given for chronic pain. Patient verbalizes anxiety about her condition and about care plan. Son verbalizes being on board with current plan of care.  Please review provider order for any additional goals.   Nurse to notify provider when observation goals have been met and patient is ready for discharge.

## 2022-04-10 NOTE — PROGRESS NOTES
PRIMARY DIAGNOSIS: GENERALIZED WEAKNESS    OUTPATIENT/OBSERVATION GOALS TO BE MET BEFORE DISCHARGE  1. Orthostatic performed: N/A    2. Tolerating PO medications: Yes    3. Return to near baseline physical activity: Yes    4. Cleared for discharge by consultants (if involved): No - pending TCU     Discharge Planner Nurse   Safe discharge environment identified: No - pending TCU   Barriers to discharge: Yes - pending TCU        Entered by: Ericka Crowder 04/10/2022 5:43 AM    Given PRN percocet for pain and melatonin. Patient slept after. Up to commode A1.     Please review provider order for any additional goals.   Nurse to notify provider when observation goals have been met and patient is ready for discharge.

## 2022-04-10 NOTE — PROGRESS NOTES
PRIMARY DIAGNOSIS: GENERALIZED WEAKNESS    OUTPATIENT/OBSERVATION GOALS TO BE MET BEFORE DISCHARGE  1. Orthostatic performed: N/A    2. Tolerating PO medications: Yes    3. Return to near baseline physical activity: Yes    4. Cleared for discharge by consultants (if involved): No - pending TCU and plan for long term pain control and fall prevention.    Discharge Planner Nurse   Safe discharge environment identified: No - pending TCU   Barriers to discharge: Yes       Entered by: Ericka Crowder 04/10/2022 5:48 AM    Given PRN Tylenol for pain. Up to commode assist of 1. VSS. Left arm has been itching where previous IV was, ice application has helped with itching.    Please review provider order for any additional goals.   Nurse to notify provider when observation goals have been met and patient is ready for discharge.

## 2022-04-10 NOTE — PROGRESS NOTES
Care Management Follow Up    Length of Stay (days): 0    Expected Discharge Date: 04/11/2022     Concerns to be Addressed: care coordination/care conferences, discharge planning, other (see comments) (TCU  placement.)     Patient plan of care discussed at interdisciplinary rounds: Yes    Anticipated Discharge Disposition: Transitional Care     Anticipated Discharge Services: None  Anticipated Discharge DME: None    Patient/family educated on Medicare website which has current facility and service quality ratings: yes  Education Provided on the Discharge Plan:    Patient/Family in Agreement with the Plan: yes    Referrals Placed by CM/SW: Post Acute Facilities    Additional Information:  [facility] and reached voicemail. CM requested call back for placement to TCU.     CM called [facility] and reached voicemail. CM to continue to follow up as needed.     CARISSA Hammond

## 2022-04-11 ENCOUNTER — APPOINTMENT (OUTPATIENT)
Dept: OCCUPATIONAL THERAPY | Facility: CLINIC | Age: 82
End: 2022-04-11
Payer: MEDICARE

## 2022-04-11 ENCOUNTER — DOCUMENTATION ONLY (OUTPATIENT)
Dept: OTHER | Facility: CLINIC | Age: 82
End: 2022-04-11

## 2022-04-11 LAB
GLUCOSE BLDC GLUCOMTR-MCNC: 127 MG/DL (ref 70–99)
GLUCOSE BLDC GLUCOMTR-MCNC: 141 MG/DL (ref 70–99)
GLUCOSE BLDC GLUCOMTR-MCNC: 167 MG/DL (ref 70–99)
GLUCOSE BLDC GLUCOMTR-MCNC: 188 MG/DL (ref 70–99)
GLUCOSE BLDC GLUCOMTR-MCNC: 286 MG/DL (ref 70–99)
GLUCOSE BLDC GLUCOMTR-MCNC: 69 MG/DL (ref 70–99)

## 2022-04-11 PROCEDURE — 250N000013 HC RX MED GY IP 250 OP 250 PS 637: Performed by: PAIN MEDICINE

## 2022-04-11 PROCEDURE — 250N000012 HC RX MED GY IP 250 OP 636 PS 637: Performed by: INTERNAL MEDICINE

## 2022-04-11 PROCEDURE — 250N000013 HC RX MED GY IP 250 OP 250 PS 637: Performed by: INTERNAL MEDICINE

## 2022-04-11 PROCEDURE — 250N000011 HC RX IP 250 OP 636: Performed by: FAMILY MEDICINE

## 2022-04-11 PROCEDURE — G0378 HOSPITAL OBSERVATION PER HR: HCPCS

## 2022-04-11 PROCEDURE — 82962 GLUCOSE BLOOD TEST: CPT

## 2022-04-11 PROCEDURE — 97535 SELF CARE MNGMENT TRAINING: CPT | Mod: GO

## 2022-04-11 PROCEDURE — 250N000013 HC RX MED GY IP 250 OP 250 PS 637: Performed by: FAMILY MEDICINE

## 2022-04-11 PROCEDURE — 96372 THER/PROPH/DIAG INJ SC/IM: CPT | Performed by: INTERNAL MEDICINE

## 2022-04-11 PROCEDURE — 96372 THER/PROPH/DIAG INJ SC/IM: CPT | Performed by: FAMILY MEDICINE

## 2022-04-11 PROCEDURE — 99214 OFFICE O/P EST MOD 30 MIN: CPT | Performed by: PAIN MEDICINE

## 2022-04-11 PROCEDURE — 99226 PR SUBSEQUENT OBSERVATION CARE,LEVEL III: CPT | Performed by: FAMILY MEDICINE

## 2022-04-11 RX ADMIN — ACETAMINOPHEN 975 MG: 325 TABLET ORAL at 14:35

## 2022-04-11 RX ADMIN — MIRABEGRON 25 MG: 25 TABLET, FILM COATED, EXTENDED RELEASE ORAL at 21:40

## 2022-04-11 RX ADMIN — OXYCODONE HYDROCHLORIDE 10 MG: 5 TABLET ORAL at 14:36

## 2022-04-11 RX ADMIN — LISINOPRIL 5 MG: 5 TABLET ORAL at 08:28

## 2022-04-11 RX ADMIN — Medication: at 08:32

## 2022-04-11 RX ADMIN — LIDOCAINE 1 PATCH: 246 PATCH TOPICAL at 23:13

## 2022-04-11 RX ADMIN — PANTOPRAZOLE SODIUM 40 MG: 20 TABLET, DELAYED RELEASE ORAL at 08:28

## 2022-04-11 RX ADMIN — LAMOTRIGINE 100 MG: 100 TABLET ORAL at 08:28

## 2022-04-11 RX ADMIN — Medication: at 23:02

## 2022-04-11 RX ADMIN — METFORMIN HYDROCHLORIDE 500 MG: 500 TABLET, FILM COATED ORAL at 18:04

## 2022-04-11 RX ADMIN — LIDOCAINE: 50 OINTMENT TOPICAL at 08:32

## 2022-04-11 RX ADMIN — CARVEDILOL 3.12 MG: 3.12 TABLET, FILM COATED ORAL at 18:04

## 2022-04-11 RX ADMIN — LAMOTRIGINE 100 MG: 100 TABLET ORAL at 21:38

## 2022-04-11 RX ADMIN — OXYCODONE HYDROCHLORIDE 10 MG: 5 TABLET ORAL at 08:25

## 2022-04-11 RX ADMIN — Medication: at 14:36

## 2022-04-11 RX ADMIN — MICONAZOLE NITRATE: 20 POWDER TOPICAL at 23:11

## 2022-04-11 RX ADMIN — AMLODIPINE BESYLATE 10 MG: 5 TABLET ORAL at 08:26

## 2022-04-11 RX ADMIN — ATORVASTATIN CALCIUM 10 MG: 10 TABLET, FILM COATED ORAL at 21:39

## 2022-04-11 RX ADMIN — ACETAMINOPHEN 975 MG: 325 TABLET ORAL at 21:40

## 2022-04-11 RX ADMIN — WHITE PETROLATUM: 1.75 OINTMENT TOPICAL at 08:31

## 2022-04-11 RX ADMIN — WHITE PETROLATUM: 1.75 OINTMENT TOPICAL at 14:35

## 2022-04-11 RX ADMIN — DULOXETINE HYDROCHLORIDE 60 MG: 30 CAPSULE, DELAYED RELEASE ORAL at 08:25

## 2022-04-11 RX ADMIN — INSULIN ASPART 3 UNITS: 100 INJECTION, SOLUTION INTRAVENOUS; SUBCUTANEOUS at 11:43

## 2022-04-11 RX ADMIN — FUROSEMIDE 40 MG: 40 TABLET ORAL at 08:28

## 2022-04-11 RX ADMIN — Medication 1000 MCG: at 08:35

## 2022-04-11 RX ADMIN — LIDOCAINE: 50 OINTMENT TOPICAL at 14:35

## 2022-04-11 RX ADMIN — MELOXICAM 15 MG: 7.5 TABLET ORAL at 08:28

## 2022-04-11 RX ADMIN — MICONAZOLE NITRATE: 20 POWDER TOPICAL at 08:31

## 2022-04-11 RX ADMIN — TRIAMTERENE AND HYDROCHLOROTHIAZIDE 1 TABLET: 37.5; 25 TABLET ORAL at 08:25

## 2022-04-11 RX ADMIN — CHOLECALCIFEROL TAB 125 MCG (5000 UNIT) 150 MCG: 125 TAB at 08:27

## 2022-04-11 RX ADMIN — WHITE PETROLATUM: 1.75 OINTMENT TOPICAL at 23:08

## 2022-04-11 RX ADMIN — METHOCARBAMOL 500 MG: 750 TABLET, FILM COATED ORAL at 14:37

## 2022-04-11 RX ADMIN — METFORMIN HYDROCHLORIDE 500 MG: 500 TABLET, FILM COATED ORAL at 09:52

## 2022-04-11 RX ADMIN — PREGABALIN 25 MG: 25 CAPSULE ORAL at 21:38

## 2022-04-11 RX ADMIN — HEPARIN SODIUM 5000 UNITS: 5000 INJECTION, SOLUTION INTRAVENOUS; SUBCUTANEOUS at 11:43

## 2022-04-11 RX ADMIN — PREGABALIN 25 MG: 25 CAPSULE ORAL at 08:26

## 2022-04-11 RX ADMIN — HEPARIN SODIUM 5000 UNITS: 5000 INJECTION, SOLUTION INTRAVENOUS; SUBCUTANEOUS at 23:46

## 2022-04-11 RX ADMIN — ACETAMINOPHEN 975 MG: 325 TABLET ORAL at 08:28

## 2022-04-11 RX ADMIN — PREDNISONE 6 MG: 1 TABLET ORAL at 08:26

## 2022-04-11 RX ADMIN — CARVEDILOL 3.12 MG: 3.12 TABLET, FILM COATED ORAL at 08:28

## 2022-04-11 RX ADMIN — OXYCODONE HYDROCHLORIDE 10 MG: 5 TABLET ORAL at 01:32

## 2022-04-11 NOTE — CONSULTS
Care Management Follow Up    Length of Stay (days): 0    Expected Discharge Date: 04/12/2022     Concerns to be Addressed: care coordination/care conferences, discharge planning, other (see comments) (TCU  placement.)     Patient plan of care discussed at interdisciplinary rounds: Yes    Anticipated Discharge Disposition: Transitional Care     Anticipated Discharge Services: TCU  Anticipated Discharge DME: None    Patient/family educated on Medicare website which has current facility and service quality ratings: yes  Education Provided on the Discharge Plan:  yes  Patient/Family in Agreement with the Plan: yes    Referrals Placed by CM/RAJ: Post Acute Facilities  Private pay costs discussed: not applicable at this time    Additional Information:    11:09 AM  RAJ following up on pending TCU referrals.  Renee at [facility] reports no bed availability until Thursday.   RAJ spoke with Althea lopez who reports referral is being reviewed and requested that CM calls back this afternoon.     1:11 PM  RAJ left voice mail for [facility] requesting call back.    2:23 PM  [facility]    CARISSA Arias

## 2022-04-11 NOTE — PLAN OF CARE
Goal Outcome Evaluation:      Patient is alert and oriented. Able to make needs known. Pt. Had soft formed brown BM. Smear of bright red blood noted on tissue after wiping. Rectal check performed, no redness observed. Patient was anxious but was reassured that no further bleeding was noted. Received PRN medications for pain control. Was up with assist of one to the bathroom. Awaiting TCU placement.

## 2022-04-11 NOTE — PROGRESS NOTES
[facility] ACUTE PAIN SERVICE    ([facility], [facility], [facility])  Daily PAIN Progress Note    Assessment/Plan:  Tonya Yang is a 81 year old female who was admitted on 4/7/2022.  I was asked to see the patient for back pain. Admitted for generalized weakness, fall at TCU without injury and displeased with TCU care. History of HTN, obesity, DMII, polyarthralgia, chronic back pain- on polypharmacy.   Pain is the same today. In the last 24 hours, patient has utilized 3 doses of oxycodone 10 mg each, total MME about 45 mg.  Met with pt and son today and provided them with detailed follow up information.       PLAN:   1) weakness and fall likely due to polypharmacy.  Would not recommend diazepam with Percocet given risk for overdose. Discussed with Neurosurgery- they agree with intrathecal pain pump evaluation. Continue to up titrate Lyrica. Weaned off Valium. Called [facility] pain clinic today to set up appointment, patient to decide if going to [facility] pain clinic vs [facility]. Meeting with the patient and son today at 11 am.   2)Multimodal Medication Therapy  Topical:  Lidocaine patch 4% and lidocaine ointment 5% tid capsacin  Adjuvants:  Tyenol  975 mgTID and Gabapentin discontinued and replaced with Lyrica 25 mg po bid.   Hx of CKD -recommend avoiding NSAIDs, Hospitalist has ordered meloxicam 15 mg daily(benefit vs risk).  Patient is also on prednisone.   Antidepressants/anxiolytics: receives duloxetine, Lamictal, and as needed lorazepam for anxiety-would suggest using methocarbamol on a as needed basis only and not scheduled. (wean off ativan and valium slowly), has maryjane on Cymbalta for pain since 2012 with minimal benefit- consider a taper off that as well(recommend taper over one month)  Given her hx of bipolar disorder, Cymbalta may be the wrong med for her.   Opioids:  Oxycodone 5-10 mg po q 6 h prn  3)Non-medication interventions- Ice, Heat, physical therapy, Add OT lymphedema  wraps  4)Constipation Prophylaxis- senna and Miralax  on hold  5) Follow up - Opioid prescriber has been Ananya Mclean  -Discharge Recommendations - We recommend prescribing the following at the time of discharge: it is recommended she seek consult from her previous pain clinic ([facility]).   Social work consult for outpatient mental health   -[facility] Appointment Wednesday 4/13 at 1:15  Or  [facility] on 5/10/22 at 9:20 am    Subjective:  Met with patient and her son Davie. Reviewed many options for pain control including meeting with an intrathecal pain pump MD.   WE talked about weaning off of Cymbalta.   We reviewed appointment times and potential 2nd opinion.   The patient is tangential talks about her hearing, needing to stay in the hospital until her edema has completely resolved, and also ruminates about TCU and her living situation.             <principal problem not specified>   Patient Active Problem List   Diagnosis     Postmenopausal atrophic vaginitis     IBS (irritable bowel syndrome)     Meniere's disease (cochlear hydrops) - on prednisone and triamterene hydrochlorothiazide     GERD (gastroesophageal reflux disease)     CKD (chronic kidney disease) stage 3, GFR 30-59 ml/min (H)     Health Care Home     Iron deficiency anemia due to chronic blood loss     Deafness     Abscess,& FB in liver in 4-10 & 4-11 w recurrent pain in RUQ  in 7-13 s/po lap I&D and unroofing sans finding of an FB  in 11-13      Elevated liver enzymes since 12-13 liver abscess I&D     Temporomandibular joint disorder     Bipolar disorder, in full remission, most recent episode depressed (H)     Diarrhea r/o exacerbated by metformin -since 30's     Baker's cyst, left     Lower urinary tract infectious disease     Left-sided low back pain with left-sided sciatica since 1993 w reoccurrence 6-15      Meniere's disease (cochlear hydrops), LT     Steroid dependent for cochlear hydrops  since 1985      Bilateral back pain      Primary insomnia     Mixed hyperlipidemia     Localized edema-L>R lat malleolus     Microalbuminuria     Leukocytosis w Lt shift      Long-term (current) use of anticoagulants [Z79.01]     Essential hypertension     Ankle edema     Sensorineural hearing loss, bilateral     Chronic pain syndrome-issue of broken controlled sub agreement      Bilateral leg edema worsening w her increasing inactivity      Muscle weakness (generalized) worse since 11-16 w drowsiness     Type 2 diabetes mellitus with diabetic nephropathy, without long-term current use of insulin (H)     Class 2 obesity due to excess calories with serious comorbidity and body mass index (BMI) of 37.0 to 37.9 in adult     Bilateral deafness     Confusion     Hyperglycemia     Acute renal failure, unspecified acute renal failure type (H)     Pneumonia of right lower lobe due to infectious organism     Severe sepsis (H)     Type 2 diabetes mellitus with hyperglycemia (H)     Glycosuria     Paravertebral mass     Abnormal finding on CT scan     Bilateral hearing loss, unspecified hearing loss type     Spasm of back muscles     Neural foraminal stenosis of cervical spine     Multilevel spondylosis     Anxiety and depression     Slow transit constipation     Weakness     Fall, initial encounter        History   Drug Use No     Comment: used [email or web address] in the past         Tobacco Use      Smoking status: Former Smoker        Types: Cigarettes        Quit date: 11/15/1987        Years since quittin.4      Smokeless tobacco: Former User          acetaminophen  975 mg Oral TID     amLODIPine  10 mg Oral Daily     atorvastatin  10 mg Oral At Bedtime     carvedilol  3.125 mg Oral BID w/meals     cyanocobalamin  1,000 mcg Sublingual Daily     DULoxetine  60 mg Oral Daily     emollient   Topical TID     [Held by provider] empagliflozin  25 mg Oral Daily     furosemide  40 mg Intravenous Once     furosemide  40 mg Oral Daily     [Held by provider] glipiZIDE  10  "mg Oral BID AC     heparin ANTICOAGULANT  5,000 Units Subcutaneous Q12H     insulin aspart  1-7 Units Subcutaneous TID AC     lamoTRIgine  100 mg Oral BID     lidocaine  1 patch Transdermal Q24h    And     lidocaine   Transdermal Q8H     lidocaine   Topical TID     lisinopril  5 mg Oral Daily     meloxicam  15 mg Oral Daily     metFORMIN  500 mg Oral BID w/meals     miconazole   Topical BID     mineral oil-hydrophilic petrolatum   Topical TID     mirabegron  25 mg Oral At Bedtime     pantoprazole  40 mg Oral Daily     [Held by provider] polyethylene glycol  17 g Oral Daily     predniSONE  6 mg Oral Daily     pregabalin  25 mg Oral BID     [Held by provider] senna-docusate  1 tablet Oral BID     triamterene-HCTZ  1 tablet Oral Daily     vitamin D3  150 mcg Oral Daily       Objective:  Vital signs in last 24 hours:  B/P: 130/60, T: 98, P: 79, R: 20   Blood pressure 130/60, pulse 79, temperature 98  F (36.7  C), temperature source Oral, resp. rate 20, height 1.651 m (5' 5\"), weight 95 kg (209 lb 8 oz), SpO2 96 %, not currently breastfeeding.      Weight:   Wt Readings from Last 2 Encounters:   04/07/22 95 kg (209 lb 8 oz)   04/04/22 88.4 kg (194 lb 12.8 oz)           Intake/Output:    Intake/Output Summary (Last 24 hours) at 4/11/2022 1035  Last data filed at 4/11/2022 0916  Gross per 24 hour   Intake 600 ml   Output 1300 ml   Net -700 ml        Review of Systems:   As per subjective, all others negative.    Physical Exam:     General Appearance:  Alert,     Head:  Normocephalic, without obvious abnormality, atraumatic   Eyes:  PERRL, conjunctiva/corneas clear, EOM's intact   ENT/Throat: Lips normal     Lymph/Neck: Supple, symmetrical, trachea midline, no adenopathy, thyroid: not enlarged, symmetric    Lungs:    respirations unlabored   Chest Wall:  No tenderness or deformity   Cardiovascular/Heart:  No sob      Abdomen:   Holding stool softeners    Musculoskeletal: Extremities with mild edema and wraps    Skin: Skin " color pale, lesions medial arm    Neurologic: Alert and oriented X 3, Moves all 4 extremities        Psych: Affect is labile   Grooming is poor            Imaging:  Personally Reviewed.       Lab Results:  Personally Reviewed.   Last Comprehensive Metabolic Panel:  Sodium   Date Value Ref Range Status   04/08/2022 145 136 - 145 mmol/L Final   10/24/2017 139 133 - 144 mmol/L Final     Potassium   Date Value Ref Range Status   04/08/2022 3.6 3.5 - 5.0 mmol/L Final   10/24/2017 3.9 3.4 - 5.3 mmol/L Final     Chloride   Date Value Ref Range Status   04/08/2022 101 98 - 107 mmol/L Final   10/24/2017 101 94 - 109 mmol/L Final     Carbon Dioxide   Date Value Ref Range Status   10/24/2017 28 20 - 32 mmol/L Final     Carbon Dioxide (CO2)   Date Value Ref Range Status   04/08/2022 30 22 - 31 mmol/L Final     Anion Gap   Date Value Ref Range Status   04/08/2022 14 5 - 18 mmol/L Final   10/24/2017 10 3 - 14 mmol/L Final     Glucose   Date Value Ref Range Status   04/08/2022 93 70 - 125 mg/dL Final   10/24/2017 116 (H) 70 - 99 mg/dL Final     GLUCOSE BY METER POCT   Date Value Ref Range Status   04/11/2022 167 (H) 70 - 99 mg/dL Final     Urea Nitrogen   Date Value Ref Range Status   04/08/2022 20 8 - 28 mg/dL Final   10/24/2017 18 7 - 30 mg/dL Final     Creatinine   Date Value Ref Range Status   04/08/2022 0.84 0.60 - 1.10 mg/dL Final   10/24/2017 0.84 0.52 - 1.04 mg/dL Final     GFR Estimate   Date Value Ref Range Status   04/08/2022 70 >60 mL/min/1.73m2 Final     Comment:     Effective December 21, 2021 eGFRcr in adults is calculated using the 2021 CKD-EPI creatinine equation which includes age and gender (Teena hampton al., NEJM, DOI: 10.1056/YAICsc6371935)   12/29/2020 >60 >60 mL/min/1.73m2 Final   10/24/2017 66 >60 mL/min/1.7m2 Final     Comment:     Non  GFR Calc     Calcium   Date Value Ref Range Status   04/08/2022 9.3 8.5 - 10.5 mg/dL Final   10/24/2017 9.5 8.5 - 10.1 mg/dL Final        UA: No results found  for: UAMP, UBARB, BENZODIAZEUR, UCANN, UCOC, OPIT, UPCP         Total time spent 37 minutes with greater than 50% in consultation, education and coordination of care. Met with pt and son, made 2 appointments for patient. Discussed bowel meds with pharmacist.           Jo ROMANO, CNS-BC, CNP, ACHPN  Acute Care Pain Management Program Hour 7a-1700  [facility] ([place], [place], [place])   Page via [facility]- Click HERE to page Jo or call [phone]

## 2022-04-11 NOTE — PLAN OF CARE
Goal Outcome Evaluation:  Pt states she has had diarrhea for over 24 hours and she has told all the staff that she's interacted with that and she is still being given Miralax & stool softeners. PLEASE DO NOT GIVE ANY LAXATIVES OR SOFTENERS OR EVEN INSULIN PER PT REQUEST. Pt controls her blood sugar with Metformin & Glipizide. She is very reluctant to take insulin, particularily because her blood sugar was low this morning (63). Pt is very upset about the miscommunications that have lead to her being given laxatives & softeners while she has major diarrhea. Pt will ask repeatedly to make sure none of these are being given to her. Otherwise pt states that she is ambulating better than she has in a long time & she attributes that to the care she's receiving here. Pt's son is her primary contact.

## 2022-04-11 NOTE — PROGRESS NOTES
[facility] MEDICINE PROGRESS NOTE      Identification/Summary: Tonya Yang is a 81 year old female with a past medical history of  hypertension, diabetes mellitus type 2, neuropathy, polyarthralgia, chronic low back pain, severe spinal stenosis recently hospitalized at [facility] 3/7-3/13 for pneumonia, severe sepsis, acute on chronic low back pain received steroid injection discharged to TCU presented to the ED with generalized weakness.  Awaiting for TCU placement. Care management following.  Bilateral pedal edema are improved with IV Lasix x 2 and lymphedema wrap.  Had episode of hypoglycemia.  Bedtime insulin is discontinued.  On multiple antidiabetic medications including Jardiance 25 mg daily, glipizide 10 mg twice a day, Metformin 500 mg twice a day.  Jardiance and glipizide are on hold today.  Evaluated by acute pain team for chronic back pain.     Assessment and Plan:  Generalized weakness    Recent fall  PT and OT evaluation, recommended TCU placement    Severe spinal stenosis   Recent epidural steroid injection  Chronic low back pain  Polypharmacy  Appreciate acute pain team  Recommended follow-up with chronic pain clinic  Pain control with Tylenol 975 mg 3 times a day, meloxicam 15 mg daily, oxycodone 5 to 10 mg every 6 hours as needed, local lidocaine patch  Robaxin 500 mg every 6 hours as needed  Lyrica 25 mg twice a day  Cymbalta 60 mg daily    Chronic lower extremity edema  Status post IV Lasix 40 mg x 2  Resume p.o. Lasix 40 mg daily, higher dose  Lymphedema therapy      DM2  Episodes of hypoglycemia  On multiple hypoglycemic agents  Jardiance 25 mg daily, on hold  Glipizide 10 mg twice a day, on hold  Resume Metformin 500 mg twice a day  Insulin sliding scale with meals    Essential hypertension  Norvasc 10 mg daily  Carvedilol 3.125 mg twice a day  Lisinopril 5 mg daily  Maxzide 37.5/25 mg daily    Dyslipidemia  Lipitor 10 mg daily    Anxiety, depression, bipolar  disorder  Has acute anxiety due to recent illness  Lamictal 100 mg twice a day    GERD, resume PPI    Tenia groin  Miconazole powder    DVT prophylaxis  Subcu heparin 5000 unit twice a day  Code full  Barrier to discharge  Routing for more clinical improvement.  Anticipated discharge to TCU in 1 day      Interval History/Subjective:  Sitting in a recliner comfortably.  Had episode of hypoglycemia in the morning.  Had multiple episodes of loose bowel movements.  Has low back pain and right-sided thigh pain.    Review of systems  Rest of the review of system are negative except HPI.  Denies headache, chest pain, breathing difficulty, palpitation, nausea, vomiting, abdominal pain or urinary symptoms.    Physical Exam/Objective:  Temp:  [97.3  F (36.3  C)-98.2  F (36.8  C)] 98  F (36.7  C)  Pulse:  [72-86] 79  Resp:  [18-20] 20  BP: (118-140)/(55-63) 130/60  SpO2:  [92 %-96 %] 96 %  Body mass index is 34.86 kg/m .     GENERAL:  Alert, appears comfortable, in no acute distress, appears stated age, anxious   HEAD:  Normocephalic, without obvious abnormality, atraumatic   EYES:  PERRL, conjunctiva  clear,   EOM's intact   NOSE: Nares normal,  mucosa normal, no drainage   THROAT: Lips, mucosa,   gums normal, mouth moist   NECK: Supple, symmetrical, trachea midline   BACK:   Symmetric, no curvature, ROM normal   LUNGS:   Clear to auscultation bilaterally, no rales, rhonchi, or wheezing, symmetric chest rise on inhalation, respirations unlabored   CHEST WALL:  No tenderness or deformity   HEART:  Regular rate and rhythm, S1 and S2 normal, no murmur    ABDOMEN:   Soft, non-tender, bowel sounds active  s, no masses, no organomegaly    EXTREMITIES: 1+ bilateral pedal edema, lymphedema wraps are present   SKIN: No rashes   NEURO: Alert, oriented x3, moves all four extremities freely   PSYCH: Cooperative, behavior is appropriate      Data reviewed today: I personally reviewed all new medications, labs, imaging/diagnostics reports  over the past 24 hours. Pertinent findings include:    Imaging:   Lumber spine MRI  1.  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  2.  At L4-L5 there is severe central spinal canal stenosis with severe bilateral subarticular zone stenosis, moderate left neural foraminal stenosis and mild right neural foraminal stenosis.  3.  At L5-S1 there is mild central spinal canal stenosis with severe bilateral neural foraminal stenosis and compression of the bilateral exiting cauda equina nerve roots, left greater than right.  4.  At L3-L4 there is severe central spinal canal stenosis with mild left and mild to moderate right neural foraminal stenosis.  5.  At L2-L3 there is moderate central spinal canal stenosis with mild right neural foraminal stenosis.   6.  Multilevel fluid in the facet joints, as described above, is favored to be degenerative in etiology, however, an infectious/inflammatory facet synovitis could have a similar appearance.     Labs:  Most Recent 3 CBC's:  Recent Labs   Lab Test 04/07/22  1311 03/11/22  0510 03/08/22  0626   WBC 10.3 8.9 19.5*   HGB 9.7* 8.7* 8.5*   MCV 84 84 81    269 272     Most Recent 3 BMP's:  Recent Labs   Lab Test 04/11/22  0950 04/11/22  0909 04/11/22  0825 04/08/22  0840 04/08/22  0223 04/07/22  1919 04/07/22  1311 04/07/22  0859   NA  --   --   --   --  145  --  141 143   POTASSIUM  --   --   --   --  3.6  --  4.5 3.7   CHLORIDE  --   --   --   --  101  --  99 99   CO2  --   --   --   --  30  --  29 32*   BUN  --   --   --   --  20  --  21 19   CR  --   --   --   --  0.84  --  1.00 0.85   ANIONGAP  --   --   --   --  14  --  13 12   MACY  --   --   --   --  9.3  --  10.0 10.3   * 127* 69*   < > 93   < > 225* 143*    < > = values in this interval not displayed.       Medications:   Personally Reviewed.  Medications       acetaminophen  975 mg Oral TID     amLODIPine  10 mg Oral Daily     atorvastatin  10 mg Oral At Bedtime      carvedilol  3.125 mg Oral BID w/meals     cyanocobalamin  1,000 mcg Sublingual Daily     DULoxetine  60 mg Oral Daily     emollient   Topical TID     [Held by provider] empagliflozin  25 mg Oral Daily     furosemide  40 mg Intravenous Once     furosemide  40 mg Oral Daily     [Held by provider] glipiZIDE  10 mg Oral BID AC     heparin ANTICOAGULANT  5,000 Units Subcutaneous Q12H     insulin aspart  1-7 Units Subcutaneous TID AC     lamoTRIgine  100 mg Oral BID     lidocaine  1 patch Transdermal Q24h    And     lidocaine   Transdermal Q8H     lidocaine   Topical TID     lisinopril  5 mg Oral Daily     meloxicam  15 mg Oral Daily     metFORMIN  500 mg Oral BID w/meals     miconazole   Topical BID     mineral oil-hydrophilic petrolatum   Topical TID     mirabegron  25 mg Oral At Bedtime     pantoprazole  40 mg Oral Daily     [Held by provider] polyethylene glycol  17 g Oral Daily     predniSONE  6 mg Oral Daily     pregabalin  25 mg Oral BID     [Held by provider] senna-docusate  1 tablet Oral BID     triamterene-HCTZ  1 tablet Oral Daily     vitamin D3  150 mcg Oral Daily

## 2022-04-12 ENCOUNTER — APPOINTMENT (OUTPATIENT)
Dept: OCCUPATIONAL THERAPY | Facility: CLINIC | Age: 82
End: 2022-04-12
Payer: MEDICARE

## 2022-04-12 ENCOUNTER — APPOINTMENT (OUTPATIENT)
Dept: PHYSICAL THERAPY | Facility: CLINIC | Age: 82
End: 2022-04-12
Payer: MEDICARE

## 2022-04-12 LAB
GLUCOSE BLDC GLUCOMTR-MCNC: 140 MG/DL (ref 70–99)
GLUCOSE BLDC GLUCOMTR-MCNC: 172 MG/DL (ref 70–99)
GLUCOSE BLDC GLUCOMTR-MCNC: 182 MG/DL (ref 70–99)
GLUCOSE BLDC GLUCOMTR-MCNC: 216 MG/DL (ref 70–99)

## 2022-04-12 PROCEDURE — 250N000012 HC RX MED GY IP 250 OP 636 PS 637: Performed by: INTERNAL MEDICINE

## 2022-04-12 PROCEDURE — 96372 THER/PROPH/DIAG INJ SC/IM: CPT

## 2022-04-12 PROCEDURE — 82962 GLUCOSE BLOOD TEST: CPT

## 2022-04-12 PROCEDURE — 99214 OFFICE O/P EST MOD 30 MIN: CPT | Performed by: NURSE PRACTITIONER

## 2022-04-12 PROCEDURE — 250N000013 HC RX MED GY IP 250 OP 250 PS 637: Performed by: NURSE PRACTITIONER

## 2022-04-12 PROCEDURE — 97116 GAIT TRAINING THERAPY: CPT | Mod: GP

## 2022-04-12 PROCEDURE — 97535 SELF CARE MNGMENT TRAINING: CPT | Mod: GO

## 2022-04-12 PROCEDURE — 250N000013 HC RX MED GY IP 250 OP 250 PS 637: Performed by: INTERNAL MEDICINE

## 2022-04-12 PROCEDURE — 250N000013 HC RX MED GY IP 250 OP 250 PS 637: Performed by: PAIN MEDICINE

## 2022-04-12 PROCEDURE — 99226 PR SUBSEQUENT OBSERVATION CARE,LEVEL III: CPT | Performed by: FAMILY MEDICINE

## 2022-04-12 PROCEDURE — G0378 HOSPITAL OBSERVATION PER HR: HCPCS

## 2022-04-12 PROCEDURE — 97530 THERAPEUTIC ACTIVITIES: CPT | Mod: GP

## 2022-04-12 PROCEDURE — 250N000011 HC RX IP 250 OP 636: Performed by: FAMILY MEDICINE

## 2022-04-12 PROCEDURE — 250N000013 HC RX MED GY IP 250 OP 250 PS 637: Performed by: FAMILY MEDICINE

## 2022-04-12 PROCEDURE — 96372 THER/PROPH/DIAG INJ SC/IM: CPT | Performed by: FAMILY MEDICINE

## 2022-04-12 RX ORDER — MINERAL OIL/HYDROPHIL PETROLAT
OINTMENT (GRAM) TOPICAL 3 TIMES DAILY PRN
COMMUNITY
Start: 2022-04-12 | End: 2022-08-09

## 2022-04-12 RX ORDER — METHOCARBAMOL 500 MG/1
500 TABLET, FILM COATED ORAL EVERY 6 HOURS PRN
COMMUNITY
Start: 2022-04-12 | End: 2022-04-15

## 2022-04-12 RX ORDER — FUROSEMIDE 40 MG
40 TABLET ORAL
Status: DISCONTINUED | OUTPATIENT
Start: 2022-04-12 | End: 2022-04-15 | Stop reason: HOSPADM

## 2022-04-12 RX ORDER — FUROSEMIDE 40 MG
40 TABLET ORAL DAILY
Status: ON HOLD | COMMUNITY
Start: 2022-04-12 | End: 2022-04-24

## 2022-04-12 RX ADMIN — WHITE PETROLATUM: 1.75 OINTMENT TOPICAL at 14:42

## 2022-04-12 RX ADMIN — PREGABALIN 25 MG: 25 CAPSULE ORAL at 10:05

## 2022-04-12 RX ADMIN — OXYCODONE HYDROCHLORIDE 10 MG: 5 TABLET ORAL at 07:35

## 2022-04-12 RX ADMIN — AMLODIPINE BESYLATE 10 MG: 5 TABLET ORAL at 10:03

## 2022-04-12 RX ADMIN — LAMOTRIGINE 100 MG: 100 TABLET ORAL at 21:38

## 2022-04-12 RX ADMIN — INSULIN ASPART 2 UNITS: 100 INJECTION, SOLUTION INTRAVENOUS; SUBCUTANEOUS at 17:58

## 2022-04-12 RX ADMIN — LISINOPRIL 5 MG: 5 TABLET ORAL at 10:04

## 2022-04-12 RX ADMIN — CHOLECALCIFEROL TAB 125 MCG (5000 UNIT) 150 MCG: 125 TAB at 10:13

## 2022-04-12 RX ADMIN — HEPARIN SODIUM 5000 UNITS: 5000 INJECTION, SOLUTION INTRAVENOUS; SUBCUTANEOUS at 23:23

## 2022-04-12 RX ADMIN — MIRABEGRON 25 MG: 25 TABLET, FILM COATED, EXTENDED RELEASE ORAL at 21:42

## 2022-04-12 RX ADMIN — LIDOCAINE: 50 OINTMENT TOPICAL at 10:10

## 2022-04-12 RX ADMIN — ACETAMINOPHEN 975 MG: 325 TABLET ORAL at 10:12

## 2022-04-12 RX ADMIN — Medication: at 10:08

## 2022-04-12 RX ADMIN — ATORVASTATIN CALCIUM 10 MG: 10 TABLET, FILM COATED ORAL at 21:39

## 2022-04-12 RX ADMIN — DULOXETINE HYDROCHLORIDE 60 MG: 30 CAPSULE, DELAYED RELEASE ORAL at 10:02

## 2022-04-12 RX ADMIN — PREDNISONE 6 MG: 1 TABLET ORAL at 10:03

## 2022-04-12 RX ADMIN — LAMOTRIGINE 100 MG: 100 TABLET ORAL at 10:02

## 2022-04-12 RX ADMIN — ACETAMINOPHEN 975 MG: 325 TABLET ORAL at 21:39

## 2022-04-12 RX ADMIN — HEPARIN SODIUM 5000 UNITS: 5000 INJECTION, SOLUTION INTRAVENOUS; SUBCUTANEOUS at 10:33

## 2022-04-12 RX ADMIN — WHITE PETROLATUM: 1.75 OINTMENT TOPICAL at 21:43

## 2022-04-12 RX ADMIN — PANTOPRAZOLE SODIUM 40 MG: 20 TABLET, DELAYED RELEASE ORAL at 10:02

## 2022-04-12 RX ADMIN — MICONAZOLE NITRATE: 20 POWDER TOPICAL at 21:42

## 2022-04-12 RX ADMIN — Medication: at 21:43

## 2022-04-12 RX ADMIN — PREGABALIN 25 MG: 25 CAPSULE ORAL at 21:39

## 2022-04-12 RX ADMIN — TRIAMTERENE AND HYDROCHLOROTHIAZIDE 1 TABLET: 37.5; 25 TABLET ORAL at 10:02

## 2022-04-12 RX ADMIN — FUROSEMIDE 40 MG: 40 TABLET ORAL at 10:03

## 2022-04-12 RX ADMIN — CARVEDILOL 3.12 MG: 3.12 TABLET, FILM COATED ORAL at 17:55

## 2022-04-12 RX ADMIN — OXYCODONE HYDROCHLORIDE 10 MG: 5 TABLET ORAL at 21:40

## 2022-04-12 RX ADMIN — FUROSEMIDE 40 MG: 40 TABLET ORAL at 17:55

## 2022-04-12 RX ADMIN — MELOXICAM 15 MG: 7.5 TABLET ORAL at 10:01

## 2022-04-12 RX ADMIN — METFORMIN HYDROCHLORIDE 500 MG: 500 TABLET, FILM COATED ORAL at 10:02

## 2022-04-12 RX ADMIN — Medication: at 14:42

## 2022-04-12 RX ADMIN — EMPAGLIFLOZIN 25 MG: 25 TABLET, FILM COATED ORAL at 10:05

## 2022-04-12 RX ADMIN — CARVEDILOL 3.12 MG: 3.12 TABLET, FILM COATED ORAL at 10:02

## 2022-04-12 RX ADMIN — ACETAMINOPHEN 975 MG: 325 TABLET ORAL at 14:42

## 2022-04-12 RX ADMIN — LIDOCAINE 1 PATCH: 246 PATCH TOPICAL at 21:41

## 2022-04-12 RX ADMIN — METFORMIN HYDROCHLORIDE 500 MG: 500 TABLET, FILM COATED ORAL at 17:55

## 2022-04-12 RX ADMIN — OXYCODONE HYDROCHLORIDE 10 MG: 5 TABLET ORAL at 14:42

## 2022-04-12 RX ADMIN — Medication 1000 MCG: at 10:06

## 2022-04-12 RX ADMIN — WHITE PETROLATUM: 1.75 OINTMENT TOPICAL at 10:06

## 2022-04-12 RX ADMIN — OXYCODONE HYDROCHLORIDE 10 MG: 5 TABLET ORAL at 01:31

## 2022-04-12 NOTE — PROGRESS NOTES
[facility] ACUTE PAIN SERVICE    Daily PAIN Progress Note    Assessment/Plan:  Tonya Yang is a 81 year old female who was admitted on 4/7/2022.  Pain team was asked to see the patient for chronic back pain, weakness, falls. Admitted for generalized weakness, edema, fall at TCU without injury and displeased with TCU care. History of HTN, obesity, DMII, polyarthralgia, chronic back pain, severe spinal stenosis, on polypharmacy. Recently hospitalized at [facility] 3/7-3/13 for pneumonia, severe sepsis, acute on chronic low back pain received steroid injection discharged to TCU, pain team followed during this hospitalization. Waiting for TCU placement. Care management following.      PLAN:   1) Pain consistent with chronic low back pain, weakness and falls likely due to polypharmacy.  Would not recommend concurrent use of benzodiazepines with opioids given risk for overdose. Discussed intrathecal pain pump. Discussed medications to include robaxin, oxycodone, lyrica and stopping ativan and Valium.    2)Multimodal Medication Therapy  Topical:  Lidocaine patch 4% and lidocaine ointment 5%  Adjuvants:  Tyenol  975 mgTID, Gabapentin discontinued and replaced with Lyrica 25 mg po bid.     NSAIDs: Hx of CKD -recommend avoiding NSAIDs, Hospitalist has ordered meloxicam 15 mg daily(benefit vs risk).  Patient is also on prednisone 6mg daily.   Muscle Relaxants: Robaxin 500 mg q6h prn   Antidepressants/anxiolytics: Duloxetine 60 mg daily, Lamictal 100 mg bid. Ativan and valium stopped.   Has been on Cymbalta for pain since 2012 with minimal benefit- consider a taper off that as well but defer to Hospital Medicine Service. Given her hx of bipolar disorder, Cymbalta may be the wrong med for her.   Opioids:  Oxycodone 5-10 mg po q 6 h prn  3)Non-medication interventions- Ice, Heat, physical therapy, Add OT lymphedema wraps  4)Constipation Prophylaxis- senna and Miralax  on hold    -Opioid prescriber has been [place]  "[place] -pulled from system on 04/09/22. Last refill on 4/4. This indicated ongoing opioid use. 6 total number of prescribing providers noted.   Discharge Recommendations - We recommend prescribing the following at the time of discharge: it is recommended she seek consult from her previous pain clinic ([facility]). Social work consult for outpatient mental health   -[facility] Appointment Wednesday 4/13 at 1:15 OR [facility] on 5/10/22 at 9:20 am    Subjective:  Reports ongoing aching low back pain. Discussed IT pump. The patient is tangential talks about her hearing, her manicure, needing to stay in the hospital until her edema has completely resolved, and also ruminates about her care at TCU.     Active Problems:    Weakness    Fall, initial encounter      Objective:  Vital signs in last 24 hours:  /56 (BP Location: Right arm)   Pulse 74   Temp 97.5  F (36.4  C) (Oral)   Resp 18   Ht 1.651 m (5' 5\")   Wt 95 kg (209 lb 8 oz)   LMP  (LMP Unknown)   SpO2 92%   BMI 34.86 kg/m    Weight:   Vitals:    04/07/22 1225 04/07/22 1822   Weight: 95.3 kg (210 lb) 95 kg (209 lb 8 oz)      Weight change:   Body mass index is 34.86 kg/m .    Intake/Output last 3 shifts:  I/O last 3 completed shifts:  In: 740 [P.O.:740]  Out: 300 [Urine:300]  Intake/Output this shift:  I/O this shift:  In: 480 [P.O.:480]  Out: -     Review of Systems:   As per subjective, all others negative.    Physical Exam:  General Appearance:  Alert, cooperative, no distress, appears stated age, up in chair    Head:  Normocephalic, without obvious abnormality, atraumatic, hard of hearing    Eyes:  PERRL, conjunctiva/corneas clear, EOM's intact   Nose: Nares normal, septum midline   Throat: Lips, mucosa, and tongue normal; teeth and gums normal   Neck: Supple, symmetrical, trachea midline   Back:   Symmetric, no curvature, ROM normal   Lungs:   Clear to auscultation bilaterally, respirations unlabored   Chest Wall:  No " tenderness or deformity   Heart:  Regular rate and rhythm, S1, S2 normal    Abdomen:   Soft, non-tender, bowel sounds active all four quadrants,  no masses, no organomegaly    Extremities: BLE edema, wraps on BLEs   Skin: Skin warm, dry    Neurologic: Alert and oriented X 3, Moves all 4 extremities     Imaging: Reviewed      Labs: Reviewed   Recent Results (from the past 24 hour(s))   Glucose by meter    Collection Time: 04/11/22  5:58 PM   Result Value Ref Range    GLUCOSE BY METER POCT 188 (H) 70 - 99 mg/dL   Glucose by meter    Collection Time: 04/11/22  8:56 PM   Result Value Ref Range    GLUCOSE BY METER POCT 141 (H) 70 - 99 mg/dL   Glucose by meter    Collection Time: 04/12/22  8:00 AM   Result Value Ref Range    GLUCOSE BY METER POCT 140 (H) 70 - 99 mg/dL   Glucose by meter    Collection Time: 04/12/22 11:25 AM   Result Value Ref Range    GLUCOSE BY METER POCT 182 (H) 70 - 99 mg/dL         Total time spent 25 minutes with greater than 50% in consultation, education and coordination of care.     Also discussed with RN, pharmacist.       Venita ROMANO, TIERRAP-C  Acute Care Pain Management Program  [facility] ([place], [place], [facility])  Monday-Friday 8a-4p   Page via online paging system or call [phone]

## 2022-04-12 NOTE — PROGRESS NOTES
[facility] MEDICINE PROGRESS NOTE      Identification/Summary: Tonya Yang is a 81 year old female with a past medical history of  hypertension, diabetes mellitus type 2, neuropathy, polyarthralgia, chronic low back pain, severe spinal stenosis recently hospitalized at [facility] 3/7-3/13 for pneumonia, severe sepsis, acute on chronic low back pain received steroid injection discharged to TCU presented to the ED with generalized weakness.  Awaiting for TCU placement. Care management following.  Bilateral pedal edema are improved with IV Lasix x 2 and lymphedema wrap.  Had episode of hypoglycemia.  Bedtime insulin is discontinued.  On multiple antidiabetic medications including Jardiance 25 mg daily, glipizide 10 mg twice a day, Metformin 500 mg twice a day.  Glipizide is on hold today.  Evaluated by acute pain team for chronic back pain.  Will follow-up with neurosurgery as outpatient for back pain.  Awaiting for TCU placement.     Assessment and Plan:  Generalized weakness   Recent fall  PT OT evaluation, recommended TCU placement    Severe spinal stenosis   Recent epidural steroid injection  Will follow up with neurosurgery as outpatient  Chronic low back pain  Polypharmacy  Appreciate acute pain team  Recommended follow-up with chronic pain clinic for intrathecal pump  Pain control with Tylenol 975 mg 3 times a day, meloxicam 15 mg daily, oxycodone 5 to 10 mg every 6 hours as needed, local lidocaine patch  Robaxin 500 mg every 6 hours as needed  Lyrica 25 mg twice a day  Cymbalta 60 mg daily    Chronic lower extremity edema  Status post IV Lasix 40 mg x 2  Resume p.o. Lasix 40 mg bid, higher dose  Triamterene hydrochlorothiazide is on hold  Lymphedema therapy    DM2  Episodes of hypoglycemia  On multiple hypoglycemic agents  Jardiance 25 mg daily, on hold  Glipizide 10 mg twice a day, on hold  Resume Metformin 500 mg twice a day  Insulin sliding scale with meals    Essential  hypertension  Norvasc 10 mg daily  Carvedilol 3.125 mg twice a day  Lisinopril 5 mg daily  Maxzide 37.5/25 mg daily, on hold    Dyslipidemia  Lipitor 10 mg daily    Anxiety, depression, bipolar disorder  Has acute anxiety due to recent illness  Lamictal 100 mg twice a day    GERD, resume PPI    Tenia groin  Miconazole powder    DVT prophylaxis  Subcu heparin 5000 unit twice a day  Code full  Barrier to discharge  Routing for more clinical improvement.  Anticipated discharge to TCU in 1 day    Interval History/Subjective:  Resting comfortably.  Blood glucose numbers are stable.  Lower extremity edema is slowly improving.  Has moderate back pain and bilateral thigh pain with activity.    Review of systems  Rest of the review of system are negative except HPI.  Denies headache, chest pain, breathing difficulty, palpitation, nausea, vomiting, abdominal pain or urinary symptoms.    Physical Exam/Objective:  Temp:  [97.5  F (36.4  C)-98.4  F (36.9  C)] 97.5  F (36.4  C)  Pulse:  [71-78] 74  Resp:  [16-18] 18  BP: (109-143)/(51-63) 122/56  SpO2:  [92 %-94 %] 92 %  Body mass index is 34.86 kg/m .     GENERAL:  Alert, appears comfortable, in no acute distress, appears stated age, anxious   HEAD:  Normocephalic, without obvious abnormality, atraumatic   EYES:  PERRL, conjunctiva  clear,   EOM's intact   NOSE: Nares normal,  mucosa normal, no drainage   THROAT: Lips, mucosa,   gums normal, mouth moist   NECK: Supple, symmetrical, trachea midline   BACK:   Symmetric, no curvature, ROM normal   LUNGS:   Clear to auscultation bilaterally, no rales, rhonchi, or wheezing, symmetric chest rise on inhalation, respirations unlabored   CHEST WALL:  No tenderness or deformity   HEART:  Regular rate and rhythm, S1 and S2 normal, no murmur    ABDOMEN:   Soft, non-tender, bowel sounds active  s, no masses, no organomegaly    EXTREMITIES: 1+ bilateral pedal edema, lymphedema wraps are present   SKIN: No rashes   NEURO: Alert, oriented x3,  moves all four extremities freely   PSYCH: Cooperative, behavior is appropriate      Data reviewed today: I personally reviewed all new medications, labs, imaging/diagnostics reports over the past 24 hours. Pertinent findings include:    Imaging:   Lumber spine MRI  1.  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  2.  At L4-L5 there is severe central spinal canal stenosis with severe bilateral subarticular zone stenosis, moderate left neural foraminal stenosis and mild right neural foraminal stenosis.  3.  At L5-S1 there is mild central spinal canal stenosis with severe bilateral neural foraminal stenosis and compression of the bilateral exiting cauda equina nerve roots, left greater than right.  4.  At L3-L4 there is severe central spinal canal stenosis with mild left and mild to moderate right neural foraminal stenosis.  5.  At L2-L3 there is moderate central spinal canal stenosis with mild right neural foraminal stenosis.   6.  Multilevel fluid in the facet joints, as described above, is favored to be degenerative in etiology, however, an infectious/inflammatory facet synovitis could have a similar appearance.     Labs:  Most Recent 3 CBC's:  Recent Labs   Lab Test 04/07/22  1311 03/11/22  0510 03/08/22  0626   WBC 10.3 8.9 19.5*   HGB 9.7* 8.7* 8.5*   MCV 84 84 81    269 272     Most Recent 3 BMP's:  Recent Labs   Lab Test 04/12/22  1125 04/12/22  0800 04/11/22  2056 04/08/22  0840 04/08/22  0223 04/07/22  1919 04/07/22  1311 04/07/22  0859   NA  --   --   --   --  145  --  141 143   POTASSIUM  --   --   --   --  3.6  --  4.5 3.7   CHLORIDE  --   --   --   --  101  --  99 99   CO2  --   --   --   --  30  --  29 32*   BUN  --   --   --   --  20  --  21 19   CR  --   --   --   --  0.84  --  1.00 0.85   ANIONGAP  --   --   --   --  14  --  13 12   MACY  --   --   --   --  9.3  --  10.0 10.3   * 140* 141*   < > 93   < > 225* 143*    < > = values in this interval not  displayed.       Medications:   Personally Reviewed.  Medications       acetaminophen  975 mg Oral TID     amLODIPine  10 mg Oral Daily     atorvastatin  10 mg Oral At Bedtime     carvedilol  3.125 mg Oral BID w/meals     cyanocobalamin  1,000 mcg Sublingual Daily     DULoxetine  60 mg Oral Daily     emollient   Topical TID     empagliflozin  25 mg Oral Daily     furosemide  40 mg Intravenous Once     furosemide  40 mg Oral Daily     [Held by provider] glipiZIDE  10 mg Oral BID AC     heparin ANTICOAGULANT  5,000 Units Subcutaneous Q12H     insulin aspart  1-7 Units Subcutaneous TID AC     lamoTRIgine  100 mg Oral BID     lidocaine  1 patch Transdermal Q24h    And     lidocaine   Transdermal Q8H     lidocaine   Topical TID     lisinopril  5 mg Oral Daily     meloxicam  15 mg Oral Daily     metFORMIN  500 mg Oral BID w/meals     miconazole   Topical BID     mineral oil-hydrophilic petrolatum   Topical TID     mirabegron  25 mg Oral At Bedtime     pantoprazole  40 mg Oral Daily     [Held by provider] polyethylene glycol  17 g Oral Daily     predniSONE  6 mg Oral Daily     pregabalin  25 mg Oral BID     [Held by provider] senna-docusate  1 tablet Oral BID     triamterene-HCTZ  1 tablet Oral Daily     vitamin D3  150 mcg Oral Daily

## 2022-04-12 NOTE — PLAN OF CARE
Status [id number]:    Patient is alert and oriented, call light appropriate and within reach. Bed alarm on. Voiding adequate amounts. Up with Assist of 1 staff, walker and belt. VSS on room air. Managing pain with lidocaine patch, prn oxy, scheduled tylenol, rest, repositioning, distraction and quiet environment. Hopeful to transition to TCU once placement is secured.

## 2022-04-12 NOTE — PROGRESS NOTES
"Care Management Follow Up    Length of Stay (days): 0    Expected Discharge Date: 04/13/2022     Concerns to be Addressed: care coordination/care conferences, discharge planning, other (see comments) (TCU  placement.)     Patient plan of care discussed at interdisciplinary rounds: Yes    Anticipated Discharge Disposition: Transitional Care     Anticipated Discharge Services: None  Anticipated Discharge DME: None    Patient/family educated on Medicare website which has current facility and service quality ratings: yes  Education Provided on the Discharge Plan:    Patient/Family in Agreement with the Plan: yes    Referrals Placed by CM/SW: Post Acute Facilities  Private pay costs discussed: not applicable at this time     Additional Information:    8:49 AM  RAJ called [facility] to follow up on referral for TCU, left voice mail requesting call back.     10:03 AM  RAJ met with Pt to discuss discharge planning and obtain additional TCU choices.  Pt requests SW calls sonDavie to discuss TCU choices.  RAJ called and left a voice mail for Davie requesting call back.   RAJ spoke with Pt's son, Davie.  Davie states \"we need to find a five star facility\" for Pt.  RAJ explained that we need additional referrals as currently none of the facilities are able to accept Pt.  RAJ listed facilities where referrals are pending.  Davie states [facility] needs to be taken off the list as it is a one star facility.  RAJ informed Davie that Pt is medically ready for discharge and we need to find placement.  Davie is reviewing list and will call CM back with additional choices.     2:59 PM  RAJ spoke with Pt's son, Davie, who requests referrals are sent to: [facility], [facility], [facility],  [facility], [facility], and [facility].  RAJ faxed referrals.     3:44 PM  [facility] declines due to feeling as though Pt is more LTC appropriate.     RAJ met with Pt, Pt inquiring " about PCA services. SW reviewed chart and provided Pt with contact information for [facility] Care Coordinator ([phone]) to assist Pt with establishing PCA in the future.     CARISSA Arias

## 2022-04-12 NOTE — PROGRESS NOTES
PRIMARY DIAGNOSIS: GENERALIZED WEAKNESS     OUTPATIENT/OBSERVATION GOALS TO BE MET BEFORE DISCHARGE  1. Orthostatic performed: No     2. Tolerating PO medications: Yes     3. Return to near baseline physical activity: Yes     4. Cleared for discharge by consultants (if involved): Yes     Discharge Planner Nurse   Safe discharge environment identified: No, Waiting on placement.   Barriers to discharge: Yes, waiting on placement.

## 2022-04-12 NOTE — PLAN OF CARE
PRIMARY DIAGNOSIS: GENERALIZED WEAKNESS    OUTPATIENT/OBSERVATION GOALS TO BE MET BEFORE DISCHARGE  1. Orthostatic performed: No    2. Tolerating PO medications: Yes    3. Return to near baseline physical activity: Yes    4. Cleared for discharge by consultants (if involved): Yes    Discharge Planner Nurse   Safe discharge environment identified: No, Waiting on placement.   Barriers to discharge: Yes, waiting on placement.        Entered by: Oma Cyr 04/12/2022 6:23 PM

## 2022-04-13 ENCOUNTER — APPOINTMENT (OUTPATIENT)
Dept: OCCUPATIONAL THERAPY | Facility: CLINIC | Age: 82
End: 2022-04-13
Payer: MEDICARE

## 2022-04-13 ENCOUNTER — APPOINTMENT (OUTPATIENT)
Dept: PHYSICAL THERAPY | Facility: CLINIC | Age: 82
End: 2022-04-13
Payer: MEDICARE

## 2022-04-13 LAB
ANION GAP SERPL CALCULATED.3IONS-SCNC: 13 MMOL/L (ref 5–18)
BUN SERPL-MCNC: 37 MG/DL (ref 8–28)
CALCIUM SERPL-MCNC: 9.6 MG/DL (ref 8.5–10.5)
CHLORIDE BLD-SCNC: 99 MMOL/L (ref 98–107)
CO2 SERPL-SCNC: 32 MMOL/L (ref 22–31)
CREAT SERPL-MCNC: 1.27 MG/DL (ref 0.6–1.1)
GFR SERPL CREATININE-BSD FRML MDRD: 43 ML/MIN/1.73M2
GLUCOSE BLD-MCNC: 133 MG/DL (ref 70–125)
GLUCOSE BLDC GLUCOMTR-MCNC: 142 MG/DL (ref 70–99)
GLUCOSE BLDC GLUCOMTR-MCNC: 283 MG/DL (ref 70–99)
GLUCOSE BLDC GLUCOMTR-MCNC: 342 MG/DL (ref 70–99)
GLUCOSE BLDC GLUCOMTR-MCNC: 81 MG/DL (ref 70–99)
PLATELET # BLD AUTO: 236 10E3/UL (ref 150–450)
POTASSIUM BLD-SCNC: 3.6 MMOL/L (ref 3.5–5)
SODIUM SERPL-SCNC: 144 MMOL/L (ref 136–145)

## 2022-04-13 PROCEDURE — 97116 GAIT TRAINING THERAPY: CPT | Mod: GP

## 2022-04-13 PROCEDURE — 250N000013 HC RX MED GY IP 250 OP 250 PS 637: Performed by: INTERNAL MEDICINE

## 2022-04-13 PROCEDURE — 82962 GLUCOSE BLOOD TEST: CPT

## 2022-04-13 PROCEDURE — G0378 HOSPITAL OBSERVATION PER HR: HCPCS

## 2022-04-13 PROCEDURE — 97530 THERAPEUTIC ACTIVITIES: CPT | Mod: GP

## 2022-04-13 PROCEDURE — 250N000013 HC RX MED GY IP 250 OP 250 PS 637: Performed by: NURSE PRACTITIONER

## 2022-04-13 PROCEDURE — 97535 SELF CARE MNGMENT TRAINING: CPT | Mod: GO

## 2022-04-13 PROCEDURE — 85049 AUTOMATED PLATELET COUNT: CPT | Performed by: FAMILY MEDICINE

## 2022-04-13 PROCEDURE — 36415 COLL VENOUS BLD VENIPUNCTURE: CPT | Performed by: FAMILY MEDICINE

## 2022-04-13 PROCEDURE — 96372 THER/PROPH/DIAG INJ SC/IM: CPT | Performed by: FAMILY MEDICINE

## 2022-04-13 PROCEDURE — 250N000011 HC RX IP 250 OP 636: Performed by: FAMILY MEDICINE

## 2022-04-13 PROCEDURE — 250N000013 HC RX MED GY IP 250 OP 250 PS 637: Performed by: PAIN MEDICINE

## 2022-04-13 PROCEDURE — 250N000013 HC RX MED GY IP 250 OP 250 PS 637: Performed by: FAMILY MEDICINE

## 2022-04-13 PROCEDURE — 99226 PR SUBSEQUENT OBSERVATION CARE,LEVEL III: CPT | Performed by: FAMILY MEDICINE

## 2022-04-13 PROCEDURE — 99213 OFFICE O/P EST LOW 20 MIN: CPT | Performed by: NURSE PRACTITIONER

## 2022-04-13 PROCEDURE — 250N000012 HC RX MED GY IP 250 OP 636 PS 637: Performed by: INTERNAL MEDICINE

## 2022-04-13 PROCEDURE — 96372 THER/PROPH/DIAG INJ SC/IM: CPT

## 2022-04-13 PROCEDURE — 80048 BASIC METABOLIC PNL TOTAL CA: CPT | Performed by: FAMILY MEDICINE

## 2022-04-13 RX ORDER — TRIAMCINOLONE ACETONIDE 1 MG/G
CREAM TOPICAL 2 TIMES DAILY
Status: DISCONTINUED | OUTPATIENT
Start: 2022-04-13 | End: 2022-04-15 | Stop reason: HOSPADM

## 2022-04-13 RX ORDER — GLIPIZIDE 5 MG/1
5 TABLET ORAL
Status: DISCONTINUED | OUTPATIENT
Start: 2022-04-13 | End: 2022-04-13

## 2022-04-13 RX ORDER — GLIPIZIDE 10 MG/1
10 TABLET ORAL
Status: DISCONTINUED | OUTPATIENT
Start: 2022-04-13 | End: 2022-04-15 | Stop reason: HOSPADM

## 2022-04-13 RX ORDER — CARVEDILOL 6.25 MG/1
6.25 TABLET ORAL 2 TIMES DAILY WITH MEALS
Status: DISCONTINUED | OUTPATIENT
Start: 2022-04-13 | End: 2022-04-14

## 2022-04-13 RX ORDER — FERROUS SULFATE 325(65) MG
325 TABLET ORAL DAILY
Status: DISCONTINUED | OUTPATIENT
Start: 2022-04-13 | End: 2022-04-15 | Stop reason: HOSPADM

## 2022-04-13 RX ADMIN — Medication: at 20:49

## 2022-04-13 RX ADMIN — LIDOCAINE 1 PATCH: 246 PATCH TOPICAL at 20:40

## 2022-04-13 RX ADMIN — PREDNISONE 6 MG: 1 TABLET ORAL at 08:25

## 2022-04-13 RX ADMIN — LAMOTRIGINE 100 MG: 100 TABLET ORAL at 20:39

## 2022-04-13 RX ADMIN — MICONAZOLE NITRATE: 20 POWDER TOPICAL at 20:53

## 2022-04-13 RX ADMIN — FERROUS SULFATE TAB 325 MG (65 MG ELEMENTAL FE) 325 MG: 325 (65 FE) TAB at 08:32

## 2022-04-13 RX ADMIN — ACETAMINOPHEN 975 MG: 325 TABLET ORAL at 08:23

## 2022-04-13 RX ADMIN — GLIPIZIDE 10 MG: 10 TABLET ORAL at 17:09

## 2022-04-13 RX ADMIN — PREGABALIN 25 MG: 25 CAPSULE ORAL at 08:26

## 2022-04-13 RX ADMIN — WHITE PETROLATUM: 1.75 OINTMENT TOPICAL at 08:34

## 2022-04-13 RX ADMIN — INSULIN ASPART 5 UNITS: 100 INJECTION, SOLUTION INTRAVENOUS; SUBCUTANEOUS at 12:02

## 2022-04-13 RX ADMIN — METHOCARBAMOL 500 MG: 750 TABLET, FILM COATED ORAL at 16:18

## 2022-04-13 RX ADMIN — ATORVASTATIN CALCIUM 10 MG: 10 TABLET, FILM COATED ORAL at 21:01

## 2022-04-13 RX ADMIN — LISINOPRIL 5 MG: 5 TABLET ORAL at 08:30

## 2022-04-13 RX ADMIN — Medication: at 08:32

## 2022-04-13 RX ADMIN — TRIAMCINOLONE ACETONIDE: 1 CREAM TOPICAL at 20:45

## 2022-04-13 RX ADMIN — Medication 1000 MCG: at 08:26

## 2022-04-13 RX ADMIN — HEPARIN SODIUM 5000 UNITS: 5000 INJECTION, SOLUTION INTRAVENOUS; SUBCUTANEOUS at 11:56

## 2022-04-13 RX ADMIN — CARVEDILOL 6.25 MG: 6.25 TABLET, FILM COATED ORAL at 08:26

## 2022-04-13 RX ADMIN — EMPAGLIFLOZIN 25 MG: 25 TABLET, FILM COATED ORAL at 08:27

## 2022-04-13 RX ADMIN — FUROSEMIDE 40 MG: 40 TABLET ORAL at 08:30

## 2022-04-13 RX ADMIN — ACETAMINOPHEN 975 MG: 325 TABLET ORAL at 15:03

## 2022-04-13 RX ADMIN — OXYCODONE HYDROCHLORIDE 10 MG: 5 TABLET ORAL at 05:08

## 2022-04-13 RX ADMIN — Medication: at 15:06

## 2022-04-13 RX ADMIN — INSULIN ASPART 1 UNITS: 100 INJECTION, SOLUTION INTRAVENOUS; SUBCUTANEOUS at 08:22

## 2022-04-13 RX ADMIN — PANTOPRAZOLE SODIUM 40 MG: 20 TABLET, DELAYED RELEASE ORAL at 08:29

## 2022-04-13 RX ADMIN — MELOXICAM 15 MG: 7.5 TABLET ORAL at 08:31

## 2022-04-13 RX ADMIN — OXYCODONE HYDROCHLORIDE 10 MG: 5 TABLET ORAL at 12:44

## 2022-04-13 RX ADMIN — METFORMIN HYDROCHLORIDE 500 MG: 500 TABLET, FILM COATED ORAL at 08:30

## 2022-04-13 RX ADMIN — DULOXETINE HYDROCHLORIDE 60 MG: 30 CAPSULE, DELAYED RELEASE ORAL at 08:27

## 2022-04-13 RX ADMIN — CARVEDILOL 6.25 MG: 6.25 TABLET, FILM COATED ORAL at 17:10

## 2022-04-13 RX ADMIN — FUROSEMIDE 40 MG: 40 TABLET ORAL at 17:10

## 2022-04-13 RX ADMIN — INSULIN ASPART 3 UNITS: 100 INJECTION, SOLUTION INTRAVENOUS; SUBCUTANEOUS at 17:03

## 2022-04-13 RX ADMIN — PREGABALIN 25 MG: 25 CAPSULE ORAL at 20:39

## 2022-04-13 RX ADMIN — MIRABEGRON 25 MG: 25 TABLET, FILM COATED, EXTENDED RELEASE ORAL at 21:01

## 2022-04-13 RX ADMIN — MICONAZOLE NITRATE: 20 POWDER TOPICAL at 12:02

## 2022-04-13 RX ADMIN — WHITE PETROLATUM: 1.75 OINTMENT TOPICAL at 20:54

## 2022-04-13 RX ADMIN — CHOLECALCIFEROL TAB 125 MCG (5000 UNIT) 150 MCG: 125 TAB at 08:27

## 2022-04-13 RX ADMIN — LAMOTRIGINE 100 MG: 100 TABLET ORAL at 08:30

## 2022-04-13 RX ADMIN — ACETAMINOPHEN 975 MG: 325 TABLET ORAL at 20:39

## 2022-04-13 NOTE — PROGRESS NOTES
[facility] ACUTE PAIN SERVICE    Daily PAIN Progress Note    Assessment/Plan:  Tonya Yang is a 81 year old female who was admitted on 4/7/2022.  Pain team was asked to see the patient for chronic back pain, weakness, falls. Admitted for generalized weakness, edema, fall at TCU without injury and displeased with TCU care. History of HTN, obesity, DMII, polyarthralgia, chronic back pain, severe spinal stenosis, on polypharmacy. Recently hospitalized at [facility] 3/7-3/13 for pneumonia, severe sepsis, acute on chronic low back pain received steroid injection discharged to TCU, pain team followed during this hospitalization. Waiting for TCU placement. Care management following.      PLAN:   1) Pain consistent with chronic low back pain, weakness and falls likely due to polypharmacy.  Would not recommend concurrent use of benzodiazepines with opioids given risk for overdose. Discussed intrathecal pain pump. Discussed medications to include robaxin, oxycodone, lyrica and stopping ativan and Valium.    2)Multimodal Medication Therapy  Topical:  Lidocaine patch 4% and lidocaine ointment 5%  Adjuvants:  Tyenol  975 mgTID, Gabapentin discontinued and replaced with Lyrica 25 mg po bid.     NSAIDs: Hx of CKD -recommend avoiding NSAIDs, Hospitalist has ordered meloxicam 15 mg daily (benefit vs risk).  Patient is also on prednisone 6mg daily.   Muscle Relaxants: Robaxin 500 mg q6h prn   Antidepressants/anxiolytics: Duloxetine 60 mg daily, Lamictal 100 mg bid. Ativan and valium stopped.   Has been on Cymbalta for pain since 2012 with minimal benefit- consider a taper off that as well but defer to Hospital Medicine Service. Given her hx of bipolar disorder, Cymbalta may be the wrong med for her.   Opioids:  Oxycodone 5-10 mg po q 6 h prn  3)Non-medication interventions- Ice, Heat, physical therapy, OT lymphedema wraps  4)Constipation Prophylaxis- senna and Miralax  on hold    -Opioid prescriber has been Ananya Mclean  "[place] -pulled from system on 04/09/22. Last refill on 4/4. This indicated ongoing opioid use. 6 total number of prescribing providers noted.   Discharge Recommendations - We recommend prescribing the following at the time of discharge: it is recommended she seek consult from her previous pain clinic ([facility]). Social work consult for outpatient mental health   -[facility] Appointment Wednesday 4/13 at 1:15 OR [facility] on 5/10/22 at 9:20 am  - Referral to [facility] pharmacist for polypharmacy     Subjective:  Reports ongoing aching low back pain. Discussed IT pump. The patient is tangential talks about her hearing, TCU care, concerns about all the medications she is taking.     Active Problems:    Weakness    Fall, initial encounter      Objective:  Vital signs in last 24 hours:  /56 (BP Location: Right arm)   Pulse 71   Temp 97.6  F (36.4  C) (Oral)   Resp 18   Ht 1.651 m (5' 5\")   Wt 89.4 kg (197 lb)   LMP  (LMP Unknown)   SpO2 91%   BMI 32.78 kg/m    Weight:     Vitals:    04/07/22 1225 04/07/22 1822 04/13/22 1010   Weight: 95.3 kg (210 lb) 95 kg (209 lb 8 oz) 89.4 kg (197 lb)      Weight change:   Body mass index is 32.78 kg/m .    Intake/Output last 3 shifts:  I/O last 3 completed shifts:  In: 1060 [P.O.:1060]  Out: -   Intake/Output this shift:  I/O this shift:  In: 240 [P.O.:240]  Out: -     Review of Systems:   As per subjective, all others negative.    Physical Exam:  General Appearance:  Alert, cooperative, no distress, appears stated age, up in chair    Head:  Normocephalic, without obvious abnormality, atraumatic, hard of hearing    Eyes:  PERRL, conjunctiva/corneas clear, EOM's intact   Nose: Nares normal, septum midline   Throat: Lips, mucosa, and tongue normal; teeth and gums normal   Neck: Supple, symmetrical, trachea midline   Back:   Symmetric, no curvature, ROM normal   Lungs:   Clear to auscultation bilaterally, respirations unlabored   Chest Wall:  No tenderness or " deformity   Heart:  Regular rate and rhythm, S1, S2 normal    Abdomen:   Soft, non-tender, bowel sounds active all four quadrants,  no masses, no organomegaly    Extremities: BLE edema, wraps on BLEs   Skin: Skin warm, dry    Neurologic: Alert and oriented X 3, Moves all 4 extremities     Imaging: Reviewed      Labs: Reviewed   Recent Results (from the past 24 hour(s))   Glucose by meter    Collection Time: 04/12/22  5:54 PM   Result Value Ref Range    GLUCOSE BY METER POCT 216 (H) 70 - 99 mg/dL   Glucose by meter    Collection Time: 04/12/22  9:08 PM   Result Value Ref Range    GLUCOSE BY METER POCT 172 (H) 70 - 99 mg/dL   Platelet count    Collection Time: 04/13/22  5:59 AM   Result Value Ref Range    Platelet Count 236 150 - 450 10e3/uL   Basic metabolic panel    Collection Time: 04/13/22  5:59 AM   Result Value Ref Range    Sodium 144 136 - 145 mmol/L    Potassium 3.6 3.5 - 5.0 mmol/L    Chloride 99 98 - 107 mmol/L    Carbon Dioxide (CO2) 32 (H) 22 - 31 mmol/L    Anion Gap 13 5 - 18 mmol/L    Urea Nitrogen 37 (H) 8 - 28 mg/dL    Creatinine 1.27 (H) 0.60 - 1.10 mg/dL    Calcium 9.6 8.5 - 10.5 mg/dL    Glucose 133 (H) 70 - 125 mg/dL    GFR Estimate 43 (L) >60 mL/min/1.73m2   Glucose by meter    Collection Time: 04/13/22  7:58 AM   Result Value Ref Range    GLUCOSE BY METER POCT 142 (H) 70 - 99 mg/dL   Glucose by meter    Collection Time: 04/13/22 11:38 AM   Result Value Ref Range    GLUCOSE BY METER POCT 342 (H) 70 - 99 mg/dL         Total time spent 25 minutes with greater than 50% in consultation, education and coordination of care.     Also discussed with RN, pharmacist.       SCOOTER CarboneC  Acute Care Pain Management Program  [facility] ([place], [place], [place])  Monday-Friday 8a-4p   Page via online paging system or call [phone]

## 2022-04-13 NOTE — PROGRESS NOTES
[facility] MEDICINE PROGRESS NOTE      Identification/Summary: Tonya Yang is a 81 year old female with a past medical history of  hypertension, diabetes mellitus type 2, neuropathy, polyarthralgia, chronic low back pain, severe spinal stenosis recently hospitalized at [facility] 3/7-3/13 for pneumonia, severe sepsis, acute on chronic low back pain received steroid injection discharged to TCU presented to the ED with generalized weakness.  Awaiting for TCU placement. Care management following.  Bilateral pedal edema are improved with IV Lasix x 2 and lymphedema wrap.  Now on p.o. Lasix 40 mg twice a day.  Triamterene and hydrochlorothiazide are on hold while on higher dose of Lasix.  Had episode of hypoglycemia.  Bedtime insulin is discontinued.  On multiple antidiabetic medications including Jardiance 25 mg daily, glipizide 10 mg twice a day, Metformin 500 mg twice a day. Norvasc is discontinued as lower extremity edema.  Carvedilol dose is increased with close monitoring of blood pressure.  Resume lisinopril 5 mg daily.  Evaluated by acute pain team for chronic back pain.  Will follow-up with neurosurgery as outpatient for back pain.  Awaiting for TCU placement.  Resume Cymbalta 60 mg daily.  Tylenol 975 mg 3 times a day, oxycodone 5 to 10 mg every 6 hours as needed, Robaxin 500 mg every 6 hours as needed, Lyrica 25 mg twice a day.  Recommended to have intrathecal pump.  Patient is on chronic steroid prednisone 6 mg daily for Ménière's disease.     Assessment and Plan:  Generalized  weakness  Recent fall  PT and OT evaluation, recommended TCU placement    Severe spinal stenosis   Recent epidural steroid injection  Will follow up with neurosurgery as outpatient  Chronic low back pain  Polypharmacy  Appreciate acute pain team  Recommended follow-up with chronic pain clinic for intrathecal pump  Pain control with Tylenol 975 mg 3 times a day, meloxicam 15 mg daily, oxycodone 5 to 10 mg  every 6 hours as needed, local lidocaine patch  Robaxin 500 mg every 6 hours as needed  Lyrica 25 mg twice a day  Cymbalta 60 mg daily    Chronic lower extremity edema  Status post IV Lasix 40 mg x 2  Resume p.o. Lasix 40 mg bid, higher dose  Triamterene hydrochlorothiazide is on hold  Lymphedema therapy    DM2  Episodes of hypoglycemia  On multiple hypoglycemic agents  Jardiance 25 mg daily, on hold  Glipizide 10 mg twice a day  Resume Metformin 500 mg twice a day  Insulin sliding scale with meals    Essential hypertension  Carvedilol 6.25 mg twice a day, dose increased  Lisinopril 5 mg daily  Maxzide 37.5/25 mg daily, on hold while on higher dose of Lasix  Norvasc 10 mg daily, discontinued due to leg edema    Dyslipidemia  Lipitor 10 mg daily    Anxiety, depression, bipolar disorder  Lamictal 100 mg twice a day    GERD, resume PPI    Tenia groin  Miconazole powder    Ménière's disease  On chronic steroid    Incidental soft tissue finding on MRI  Thoracic spine MRI showed  small well circumscribed enhancing lesion along the left anterolateral paravertebral soft tissue measure 1.2 x 1.1 x 1.4 cm  CT chest showed mass in the left anterior paravertebral fat posterior to the descending thoracic aorta most likely represent lymph node   Recommend 6-month follow-up CT    DVT prophylaxis  Subcu heparin 5000 unit twice a day  Code full  Barrier to discharge  Routing for more clinical improvement.  Anticipated discharge to TCU in 1 day    Interval History/Subjective:  Resting comfortably. Lower extremity edema is improving.  Still has moderate back pain.  Awaiting for TCU placement for more rehabilitation    Review of systems  Rest of the review of system are negative except HPI.  Denies headache, chest pain, breathing difficulty, palpitation, nausea, vomiting, abdominal pain or urinary symptoms.    Physical Exam/Objective:  Temp:  [97.5  F (36.4  C)-98.1  F (36.7  C)] 97.6  F (36.4  C)  Pulse:  [71-85] 71  Resp:  [16-18]  18  BP: (112-153)/(53-65) 112/56  SpO2:  [91 %-97 %] 91 %  Body mass index is 32.78 kg/m .     GENERAL:  Alert, appears comfortable, in no acute distress, appears stated age, anxious   HEAD:  Normocephalic, without obvious abnormality, atraumatic   EYES:  PERRL, conjunctiva  clear,   EOM's intact   NOSE: Nares normal,  mucosa normal, no drainage   THROAT: Lips, mucosa,   gums normal, mouth moist   NECK: Supple, symmetrical, trachea midline   BACK:   Symmetric, no curvature, ROM normal   LUNGS:   Clear to auscultation bilaterally, no rales, rhonchi, or wheezing, symmetric chest rise on inhalation, respirations unlabored   CHEST WALL:  No tenderness or deformity   HEART:  Regular rate and rhythm, S1 and S2 normal, no murmur    ABDOMEN:   Soft, non-tender, bowel sounds active  s, no masses, no organomegaly    EXTREMITIES: 1+ bilateral pedal edema, lymphedema wraps are present   SKIN: No rashes   NEURO: Alert, oriented x3, moves all four extremities freely   PSYCH: Cooperative, behavior is appropriate      Data reviewed today: I personally reviewed all new medications, labs, imaging/diagnostics reports over the past 24 hours. Pertinent findings include:    Imaging:   Lumber spine MRI  1.  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  2.  At L4-L5 there is severe central spinal canal stenosis with severe bilateral subarticular zone stenosis, moderate left neural foraminal stenosis and mild right neural foraminal stenosis.  3.  At L5-S1 there is mild central spinal canal stenosis with severe bilateral neural foraminal stenosis and compression of the bilateral exiting cauda equina nerve roots, left greater than right.  4.  At L3-L4 there is severe central spinal canal stenosis with mild left and mild to moderate right neural foraminal stenosis.  5.  At L2-L3 there is moderate central spinal canal stenosis with mild right neural foraminal stenosis.   6.  Multilevel fluid in the facet  joints, as described above, is favored to be degenerative in etiology, however, an infectious/inflammatory facet synovitis could have a similar appearance.     Labs:  Most Recent 3 CBC's:  Recent Labs   Lab Test 04/13/22  0559 04/07/22  1311 03/11/22  0510 03/08/22  0626   WBC  --  10.3 8.9 19.5*   HGB  --  9.7* 8.7* 8.5*   MCV  --  84 84 81    283 269 272     Most Recent 3 BMP's:  Recent Labs   Lab Test 04/13/22  1138 04/13/22  0758 04/13/22  0559 04/08/22  0840 04/08/22  0223 04/07/22  1919 04/07/22  1311   NA  --   --  144  --  145  --  141   POTASSIUM  --   --  3.6  --  3.6  --  4.5   CHLORIDE  --   --  99  --  101  --  99   CO2  --   --  32*  --  30  --  29   BUN  --   --  37*  --  20  --  21   CR  --   --  1.27*  --  0.84  --  1.00   ANIONGAP  --   --  13  --  14  --  13   MACY  --   --  9.6  --  9.3  --  10.0   * 142* 133*   < > 93   < > 225*    < > = values in this interval not displayed.       Medications:   Personally Reviewed.  Medications       acetaminophen  975 mg Oral TID     atorvastatin  10 mg Oral At Bedtime     carvedilol  6.25 mg Oral BID w/meals     cyanocobalamin  1,000 mcg Sublingual Daily     DULoxetine  60 mg Oral Daily     emollient   Topical TID     empagliflozin  25 mg Oral Daily     ferrous sulfate  325 mg Oral Daily     furosemide  40 mg Oral BID     glipiZIDE  5 mg Oral BID AC     heparin ANTICOAGULANT  5,000 Units Subcutaneous Q12H     insulin aspart  1-7 Units Subcutaneous TID AC     iron sucrose  300 mg Intravenous Once     lamoTRIgine  100 mg Oral BID     lidocaine  1 patch Transdermal Q24h    And     lidocaine   Transdermal Q8H     lidocaine   Topical TID     lisinopril  5 mg Oral Daily     meloxicam  15 mg Oral Daily     metFORMIN  500 mg Oral BID w/meals     miconazole   Topical BID     mineral oil-hydrophilic petrolatum   Topical TID     mirabegron  25 mg Oral At Bedtime     pantoprazole  40 mg Oral Daily     polyethylene glycol  17 g Oral Daily     predniSONE  6  mg Oral Daily     pregabalin  25 mg Oral BID     triamcinolone   Topical BID     vitamin D3  150 mcg Oral Daily

## 2022-04-13 NOTE — PROGRESS NOTES
Care Management Follow Up    Length of Stay (days): 0    Expected Discharge Date: 04/13/2022     Concerns to be Addressed: discharge disposition  Patient plan of care discussed at interdisciplinary rounds: Yes    Anticipated Discharge Disposition: Transitional Care       Additional Information:    8:37 AM  RAJ following up on pending TCU referrals.  [facility].  RAJ left voice mail for [facility], [facility], and [facility].  SW awaiting calls back.    12:10 PM  SW has not received calls back from pending TCU placements.  SW called pending referrals again.  RAJ left voice mail for [facility], [facility], and [facility].  SW awaiting calls back.    1:51 PM  SW has yet to hear back from facilities.  RAJ called and left a voice mail for Pt's son, Davie to explain no placement has been found yet and request additional options for referrals to be sent to.     3:13 PM  When RAJ spoke with Pt's son Davie, yesterday, Davie was very specific that he wanted Pt to go to a facility with 4 or 5 stars on the Medicare compare website.  RAJ reviewed site and sent referrals to additional facilities with either 4 or 5 stars.     CARISSA Arias

## 2022-04-13 NOTE — PLAN OF CARE
PRIMARY DIAGNOSIS: ACUTE PAIN  OUTPATIENT/OBSERVATION GOALS TO BE MET BEFORE DISCHARGE:  1. Pain Status: Improved-controlled with oral pain medications.    2. Return to near baseline physical activity: No    3. Cleared for discharge by consultants (if involved): Yes    Discharge Planner Nurse   Safe discharge environment identified:  Referrals placed for TCU  Barriers to discharge: Yes TCU placement       Entered by: Katherine Black 04/13/2022 2:45 AM     Please review provider order for any additional goals.   Nurse to notify provider when observation goals have been met and patient is ready for discharge.

## 2022-04-13 NOTE — PLAN OF CARE
Patient is alert and oriented. VSS on room air. No acute events or changes this shift. Managing pain with scheduled tylenol, prn oxy, rest, repositioning, distraction and quiet environment. Lymph wraps to BLE, patient states that she feels the edema is worsening however no changes apparent overnight. Up with assist of one staff, walker and belt. Hopeful to transition to TCU or home with homecare once stable to discharge.

## 2022-04-13 NOTE — PLAN OF CARE
.  Problem: Diabetes Comorbidity  Goal: Blood Glucose Level Within Targeted Range  Outcome: Ongoing, Progressing     Problem: Hypertension Comorbidity  Goal: Blood Pressure in Desired Range  Outcome: Ongoing, Progressing  Intervention: Maintain Blood Pressure Management  Recent Flowsheet Documentation  Taken 4/13/2022 0000 by Katherine Black RN  Medication Review/Management: medications reviewed  Taken 4/12/2022 2000 by Katherine Black RN  Medication Review/Management: medications reviewed     Problem: Diarrhea  Goal: Fluid and Electrolyte Balance  Outcome: Ongoing, Progressing  Intervention: Manage Diarrhea  Recent Flowsheet Documentation  Taken 4/13/2022 0000 by Katherine Black RN  Medication Review/Management: medications reviewed  Taken 4/12/2022 2000 by Katherine Black RN  Medication Review/Management: medications reviewed     Patient is alert and oriented, call light appropriate and within reach. Bed alarm on for safety. Managing pain with scheduled tylenol, lidocaine patch, prn oxy, rest, repositioning, distraction and quiet environment. Voiding spontaneously, stool softeners held per pt request and no loose stools reported this shift. VSS on room air.

## 2022-04-13 NOTE — PLAN OF CARE
Problem: Plan of Care - These are the overarching goals to be used throughout the patient stay.    Goal: Plan of Care Review/Shift Note  Description: The Plan of Care Review/Shift note should be completed every shift.  The Outcome Evaluation is a brief statement about your assessment that the patient is improving, declining, or no change.  This information will be displayed automatically on your shift note.  Outcome: Ongoing, Progressing   Patient dozing off and on. States that she is very comfortable laying in bed, her pain increases to 5-7 when moving. Continue to monitor.     Problem: Plan of Care - These are the overarching goals to be used throughout the patient stay.    Goal: Optimal Comfort and Wellbeing  Outcome: Ongoing, Progressing     Problem: Diarrhea  Goal: Fluid and Electrolyte Balance  Outcome: Ongoing, Progressing  Intervention: Manage Diarrhea  Recent Flowsheet Documentation  Taken 4/13/2022 1000 by Tonya Nieves, RN  Medication Review/Management: medications reviewed     Problem: Sleep Impairment (Anxiety Signs/Symptoms)  Goal: Improved Sleep (Anxiety Signs/Symptoms)  Outcome: Ongoing, Progressing   Goal Outcome Evaluation:

## 2022-04-13 NOTE — PROVIDER NOTIFICATION
"Contact provider: Alert was fire for metformin. GFR was under 60. GFR at 43. Last . Hold medication or not?     Provider called back. Nurse confirmed that gliperzide was given. Verbal order with read back. \"Okay to hold metformin medication today.\"  "

## 2022-04-14 ENCOUNTER — APPOINTMENT (OUTPATIENT)
Dept: PHYSICAL THERAPY | Facility: CLINIC | Age: 82
End: 2022-04-14
Payer: MEDICARE

## 2022-04-14 ENCOUNTER — APPOINTMENT (OUTPATIENT)
Dept: OCCUPATIONAL THERAPY | Facility: CLINIC | Age: 82
End: 2022-04-14
Payer: MEDICARE

## 2022-04-14 LAB
ANION GAP SERPL CALCULATED.3IONS-SCNC: 16 MMOL/L (ref 5–18)
BUN SERPL-MCNC: 38 MG/DL (ref 8–28)
CALCIUM SERPL-MCNC: 9.7 MG/DL (ref 8.5–10.5)
CHLORIDE BLD-SCNC: 99 MMOL/L (ref 98–107)
CO2 SERPL-SCNC: 28 MMOL/L (ref 22–31)
CREAT SERPL-MCNC: 1.25 MG/DL (ref 0.6–1.1)
GFR SERPL CREATININE-BSD FRML MDRD: 44 ML/MIN/1.73M2
GLUCOSE BLD-MCNC: 113 MG/DL (ref 70–125)
GLUCOSE BLDC GLUCOMTR-MCNC: 210 MG/DL (ref 70–99)
GLUCOSE BLDC GLUCOMTR-MCNC: 226 MG/DL (ref 70–99)
GLUCOSE BLDC GLUCOMTR-MCNC: 291 MG/DL (ref 70–99)
GLUCOSE BLDC GLUCOMTR-MCNC: 80 MG/DL (ref 70–99)
GLUCOSE BLDC GLUCOMTR-MCNC: 94 MG/DL (ref 70–99)
HOLD SPECIMEN: NORMAL
POTASSIUM BLD-SCNC: 3.5 MMOL/L (ref 3.5–5)
SODIUM SERPL-SCNC: 143 MMOL/L (ref 136–145)

## 2022-04-14 PROCEDURE — 250N000011 HC RX IP 250 OP 636: Performed by: FAMILY MEDICINE

## 2022-04-14 PROCEDURE — 250N000013 HC RX MED GY IP 250 OP 250 PS 637: Performed by: INTERNAL MEDICINE

## 2022-04-14 PROCEDURE — 36415 COLL VENOUS BLD VENIPUNCTURE: CPT | Performed by: FAMILY MEDICINE

## 2022-04-14 PROCEDURE — 96372 THER/PROPH/DIAG INJ SC/IM: CPT

## 2022-04-14 PROCEDURE — 96372 THER/PROPH/DIAG INJ SC/IM: CPT | Performed by: FAMILY MEDICINE

## 2022-04-14 PROCEDURE — 97110 THERAPEUTIC EXERCISES: CPT | Mod: GP

## 2022-04-14 PROCEDURE — 250N000013 HC RX MED GY IP 250 OP 250 PS 637: Performed by: NURSE PRACTITIONER

## 2022-04-14 PROCEDURE — 97530 THERAPEUTIC ACTIVITIES: CPT | Mod: GP

## 2022-04-14 PROCEDURE — 82310 ASSAY OF CALCIUM: CPT | Performed by: FAMILY MEDICINE

## 2022-04-14 PROCEDURE — 82962 GLUCOSE BLOOD TEST: CPT | Mod: 91

## 2022-04-14 PROCEDURE — 250N000013 HC RX MED GY IP 250 OP 250 PS 637: Performed by: PAIN MEDICINE

## 2022-04-14 PROCEDURE — 97535 SELF CARE MNGMENT TRAINING: CPT | Mod: GO

## 2022-04-14 PROCEDURE — 97116 GAIT TRAINING THERAPY: CPT | Mod: GP

## 2022-04-14 PROCEDURE — G0378 HOSPITAL OBSERVATION PER HR: HCPCS

## 2022-04-14 PROCEDURE — 250N000012 HC RX MED GY IP 250 OP 636 PS 637: Performed by: INTERNAL MEDICINE

## 2022-04-14 PROCEDURE — 250N000013 HC RX MED GY IP 250 OP 250 PS 637: Performed by: FAMILY MEDICINE

## 2022-04-14 RX ORDER — FERROUS SULFATE 325(65) MG
325 TABLET ORAL DAILY
COMMUNITY
Start: 2022-04-15 | End: 2023-03-21

## 2022-04-14 RX ORDER — PREGABALIN 25 MG/1
25 CAPSULE ORAL AT BEDTIME
Status: DISCONTINUED | OUTPATIENT
Start: 2022-04-14 | End: 2022-04-15 | Stop reason: HOSPADM

## 2022-04-14 RX ORDER — CARVEDILOL 3.12 MG/1
3.12 TABLET ORAL 2 TIMES DAILY WITH MEALS
Status: DISCONTINUED | OUTPATIENT
Start: 2022-04-14 | End: 2022-04-15 | Stop reason: HOSPADM

## 2022-04-14 RX ADMIN — EMPAGLIFLOZIN 25 MG: 25 TABLET, FILM COATED ORAL at 10:16

## 2022-04-14 RX ADMIN — HEPARIN SODIUM 5000 UNITS: 5000 INJECTION, SOLUTION INTRAVENOUS; SUBCUTANEOUS at 00:05

## 2022-04-14 RX ADMIN — INSULIN ASPART 2 UNITS: 100 INJECTION, SOLUTION INTRAVENOUS; SUBCUTANEOUS at 12:55

## 2022-04-14 RX ADMIN — METFORMIN HYDROCHLORIDE 500 MG: 500 TABLET, FILM COATED ORAL at 19:02

## 2022-04-14 RX ADMIN — GLIPIZIDE 10 MG: 10 TABLET ORAL at 10:17

## 2022-04-14 RX ADMIN — WHITE PETROLATUM: 1.75 OINTMENT TOPICAL at 14:37

## 2022-04-14 RX ADMIN — Medication: at 10:25

## 2022-04-14 RX ADMIN — OXYCODONE HYDROCHLORIDE 10 MG: 5 TABLET ORAL at 05:39

## 2022-04-14 RX ADMIN — ACETAMINOPHEN 975 MG: 325 TABLET ORAL at 22:06

## 2022-04-14 RX ADMIN — PREDNISONE 6 MG: 1 TABLET ORAL at 10:15

## 2022-04-14 RX ADMIN — GLIPIZIDE 10 MG: 10 TABLET ORAL at 17:17

## 2022-04-14 RX ADMIN — PREGABALIN 25 MG: 25 CAPSULE ORAL at 10:06

## 2022-04-14 RX ADMIN — WHITE PETROLATUM: 1.75 OINTMENT TOPICAL at 10:25

## 2022-04-14 RX ADMIN — HEPARIN SODIUM 5000 UNITS: 5000 INJECTION, SOLUTION INTRAVENOUS; SUBCUTANEOUS at 10:18

## 2022-04-14 RX ADMIN — POLYETHYLENE GLYCOL 3350 17 G: 17 POWDER, FOR SOLUTION ORAL at 10:20

## 2022-04-14 RX ADMIN — Medication: at 22:15

## 2022-04-14 RX ADMIN — MIRABEGRON 25 MG: 25 TABLET, FILM COATED, EXTENDED RELEASE ORAL at 22:15

## 2022-04-14 RX ADMIN — MICONAZOLE NITRATE: 20 POWDER TOPICAL at 22:16

## 2022-04-14 RX ADMIN — ACETAMINOPHEN 975 MG: 325 TABLET ORAL at 10:06

## 2022-04-14 RX ADMIN — MICONAZOLE NITRATE: 20 POWDER TOPICAL at 10:24

## 2022-04-14 RX ADMIN — Medication 1000 MCG: at 10:16

## 2022-04-14 RX ADMIN — MELOXICAM 15 MG: 7.5 TABLET ORAL at 10:17

## 2022-04-14 RX ADMIN — DULOXETINE HYDROCHLORIDE 60 MG: 30 CAPSULE, DELAYED RELEASE ORAL at 10:14

## 2022-04-14 RX ADMIN — INSULIN ASPART 4 UNITS: 100 INJECTION, SOLUTION INTRAVENOUS; SUBCUTANEOUS at 17:18

## 2022-04-14 RX ADMIN — CHOLECALCIFEROL TAB 125 MCG (5000 UNIT) 150 MCG: 125 TAB at 10:13

## 2022-04-14 RX ADMIN — PANTOPRAZOLE SODIUM 40 MG: 20 TABLET, DELAYED RELEASE ORAL at 10:15

## 2022-04-14 RX ADMIN — CARVEDILOL 3.12 MG: 3.12 TABLET, FILM COATED ORAL at 19:01

## 2022-04-14 RX ADMIN — FERROUS SULFATE TAB 325 MG (65 MG ELEMENTAL FE) 325 MG: 325 (65 FE) TAB at 10:12

## 2022-04-14 RX ADMIN — FUROSEMIDE 40 MG: 40 TABLET ORAL at 10:06

## 2022-04-14 RX ADMIN — ATORVASTATIN CALCIUM 10 MG: 10 TABLET, FILM COATED ORAL at 22:10

## 2022-04-14 RX ADMIN — LAMOTRIGINE 100 MG: 100 TABLET ORAL at 22:10

## 2022-04-14 RX ADMIN — FUROSEMIDE 40 MG: 40 TABLET ORAL at 17:17

## 2022-04-14 RX ADMIN — LAMOTRIGINE 100 MG: 100 TABLET ORAL at 10:06

## 2022-04-14 RX ADMIN — CARVEDILOL 6.25 MG: 6.25 TABLET, FILM COATED ORAL at 10:06

## 2022-04-14 RX ADMIN — Medication: at 14:37

## 2022-04-14 RX ADMIN — METFORMIN HYDROCHLORIDE 500 MG: 500 TABLET, FILM COATED ORAL at 10:06

## 2022-04-14 RX ADMIN — LISINOPRIL 5 MG: 5 TABLET ORAL at 10:11

## 2022-04-14 RX ADMIN — ACETAMINOPHEN 975 MG: 325 TABLET ORAL at 14:37

## 2022-04-14 RX ADMIN — OXYCODONE HYDROCHLORIDE 10 MG: 5 TABLET ORAL at 14:37

## 2022-04-14 NOTE — PROGRESS NOTES
[facility] MEDICINE PROGRESS NOTE      Identification/Summary: Tonya Yang is a 81 year old female with a past medical history of  hypertension, diabetes mellitus type 2, neuropathy, polyarthralgia, chronic low back pain, severe spinal stenosis recently hospitalized at [facility] 3/7-3/13 for pneumonia, severe sepsis, acute on chronic low back pain received steroid injection discharged to TCU presented to the ED with generalized weakness.  Awaiting for TCU placement. Care management following.  Bilateral pedal edema are improved with IV Lasix x 2 and lymphedema wrap.  Now on p.o. Lasix 40 mg twice a day.  Triamterene and hydrochlorothiazide are on hold. Had episode of hypoglycemia in am.  Bedtime insulin is discontinued.  On multiple antidiabetic medications including Jardiance 25 mg daily, glipizide 10 mg twice a day, Metformin 500 mg twice a day. Norvasc is discontinued as lower extremity edema. Blood pressure is stable with carvedilol 3.125 mg twice a day, lisinopril 5 mg daily.  Evaluated by acute pain team for chronic back pain.  Will follow-up with neurosurgery as outpatient for back pain.  Awaiting for TCU placement.  Resume Cymbalta 60 mg daily.  Tylenol 975 mg 3 times a day, oxycodone 5 to 10 mg every 6 hours as needed, Robaxin 500 mg every 6 hours as needed, Lyrica 25 mg daily. Recommended intrathecal pump placement at chronic pain clinic.  Patient is on chronic steroid prednisone 6 mg daily for Ménière's disease.     Assessment and Plan:  Generalized  weakness  Recent fall  PT and OT evaluation, recommended TCU placement    Severe spinal stenosis  Recent epidural steroid injection  Will follow up with neurosurgery as outpatient  Chronic low back pain  Polypharmacy  Appreciate acute pain team  Recommended follow-up with chronic pain clinic for intrathecal pump  Pain control with Tylenol 975 mg 3 times a day, meloxicam 15 mg daily, oxycodone 5 to 10 mg every 6 hours as needed,  local lidocaine patch  Robaxin 500 mg every 6 hours as needed  Lyrica 25 mg daily (decreased due to somnolence)  Cymbalta 60 mg daily    Chronic lower extremity edema  Status post IV Lasix 40 mg x 2  Resume p.o. Lasix 40 mg bid, higher dose  Triamterene hydrochlorothiazide is on hold  Lymphedema therapy    DM2  Episodes of hypoglycemia in am  On multiple hypoglycemic agents  Jardiance 25 mg daily, on hold  Glipizide 10 mg twice a day  Resume Metformin 500 mg twice a day  Insulin sliding scale with meals  Bedtime insulin discontinued.  Will take snacks before going to bed    Essential hypertension  Carvedilol 3.125 mg twice a day  Lisinopril 5 mg daily  Maxzide 37.5/25 mg daily, discontinued as on higher dose of Lasix  Norvasc 10 mg daily, discontinued due to leg edema    Dyslipidemia  Lipitor 10 mg daily    Anxiety, depression, bipolar disorder  Lamictal 100 mg twice a day    GERD, resume PPI    Tenia groin  Miconazole powder    Ménière's disease  On chronic steroid    Incidental soft tissue finding on MRI  Thoracic spine MRI showed  small well circumscribed enhancing lesion along the left anterolateral paravertebral soft tissue measure 1.2 x 1.1 x 1.4 cm  CT chest showed mass in the left anterior paravertebral fat posterior to the descending thoracic aorta most likely represent lymph node   Recommend 6-month follow-up CT    DVT prophylaxis  Subcu heparin 5000 unit twice a day  Code full  Barrier to discharge  Routing for more clinical improvement.  Anticipated discharge to TCU in 1 day    Interval History/Subjective:  Resting comfortably complains of back pain and left thigh pain.  Son noticed somnolence in the daytime after starting of Lyrica. Awaiting for TCU placement for more rehabilitation.      Review of systems  Rest of the review of system are negative except HPI.  Denies headache, chest pain, breathing difficulty, palpitation, nausea, vomiting, abdominal pain or urinary symptoms.    Physical  Exam/Objective:  Temp:  [97.7  F (36.5  C)-98.1  F (36.7  C)] 98  F (36.7  C)  Pulse:  [70-78] 77  Resp:  [18] 18  BP: (107-133)/(52-65) 107/65  SpO2:  [90 %-91 %] 91 %  Body mass index is 32.38 kg/m .     GENERAL:  Alert, appears comfortable, in no acute distress, appears stated age, anxious   HEAD:  Normocephalic, without obvious abnormality, atraumatic   EYES:  PERRL, conjunctiva  clear,   EOM's intact   NOSE: Nares normal,  mucosa normal, no drainage   THROAT: Lips, mucosa,   gums normal, mouth moist   NECK: Supple, symmetrical, trachea midline   BACK:   Symmetric, no curvature, ROM normal   LUNGS:   Clear to auscultation bilaterally, no rales, rhonchi, or wheezing, symmetric chest rise on inhalation, respirations unlabored   CHEST WALL:  No tenderness or deformity   HEART:  Regular rate and rhythm, S1 and S2 normal, no murmur    ABDOMEN:   Soft, non-tender, bowel sounds active  s, no masses, no organomegaly    EXTREMITIES: 1+ bilateral pedal edema, lymphedema wraps are present   SKIN: No rashes   NEURO: Alert, oriented x3, moves all four extremities freely   PSYCH: Cooperative, behavior is appropriate      Data reviewed today: I personally reviewed all new medications, labs, imaging/diagnostics reports over the past 24 hours. Pertinent findings include:    Imaging:   Lumber spine MRI  1.  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  2.  At L4-L5 there is severe central spinal canal stenosis with severe bilateral subarticular zone stenosis, moderate left neural foraminal stenosis and mild right neural foraminal stenosis.  3.  At L5-S1 there is mild central spinal canal stenosis with severe bilateral neural foraminal stenosis and compression of the bilateral exiting cauda equina nerve roots, left greater than right.  4.  At L3-L4 there is severe central spinal canal stenosis with mild left and mild to moderate right neural foraminal stenosis.  5.  At L2-L3 there is moderate  central spinal canal stenosis with mild right neural foraminal stenosis.   6.  Multilevel fluid in the facet joints, as described above, is favored to be degenerative in etiology, however, an infectious/inflammatory facet synovitis could have a similar appearance.     Labs:  Most Recent 3 CBC's:  Recent Labs   Lab Test 04/13/22  0559 04/07/22  1311 03/11/22  0510 03/08/22  0626   WBC  --  10.3 8.9 19.5*   HGB  --  9.7* 8.7* 8.5*   MCV  --  84 84 81    283 269 272     Most Recent 3 BMP's:  Recent Labs   Lab Test 04/14/22  1229 04/14/22  0758 04/14/22  0443 04/13/22  0758 04/13/22  0559 04/08/22  0840 04/08/22  0223   NA  --   --  143  --  144  --  145   POTASSIUM  --   --  3.5  --  3.6  --  3.6   CHLORIDE  --   --  99  --  99  --  101   CO2  --   --  28  --  32*  --  30   BUN  --   --  38*  --  37*  --  20   CR  --   --  1.25*  --  1.27*  --  0.84   ANIONGAP  --   --  16  --  13  --  14   MACY  --   --  9.7  --  9.6  --  9.3   * 94 113   < > 133*   < > 93    < > = values in this interval not displayed.       Medications:   Personally Reviewed.  Medications       acetaminophen  975 mg Oral TID     atorvastatin  10 mg Oral At Bedtime     carvedilol  3.125 mg Oral BID w/meals     cyanocobalamin  1,000 mcg Sublingual Daily     DULoxetine  60 mg Oral Daily     emollient   Topical TID     empagliflozin  25 mg Oral Daily     ferrous sulfate  325 mg Oral Daily     furosemide  40 mg Oral BID     glipiZIDE  10 mg Oral BID AC     heparin ANTICOAGULANT  5,000 Units Subcutaneous Q12H     insulin aspart  1-7 Units Subcutaneous TID AC     iron sucrose  300 mg Intravenous Once     lamoTRIgine  100 mg Oral BID     lidocaine  1 patch Transdermal Q24h    And     lidocaine   Transdermal Q8H     lisinopril  5 mg Oral Daily     meloxicam  15 mg Oral Daily     metFORMIN  500 mg Oral BID w/meals     miconazole   Topical BID     mineral oil-hydrophilic petrolatum   Topical TID     mirabegron  25 mg Oral At Bedtime      pantoprazole  40 mg Oral Daily     polyethylene glycol  17 g Oral Daily     predniSONE  6 mg Oral Daily     pregabalin  25 mg Oral At Bedtime     triamcinolone   Topical BID     vitamin D3  150 mcg Oral Daily

## 2022-04-14 NOTE — PLAN OF CARE
Patient alert and oriented, call light appropriate and within reach. Bed alarm on for safety. Ambulating to/from bathroom with assist from one staff, walker and belt. Patient is dozing off in mid-conversations and expresses much frustration with this. VSS on room air. Lymph wraps intact to BLE. No need for any pain interventions at this time. Lidocaine patch in place. Continuing to monitor.

## 2022-04-14 NOTE — PROVIDER NOTIFICATION
Paged remote cover provider regarding need to hold metformin dose this joe due to GFR 44. Instructions to give today's dose.

## 2022-04-14 NOTE — PROGRESS NOTES
Care Management Follow Up    Length of Stay (days): 0    Expected Discharge Date: 04/14/2022     Concerns to be Addressed: Discharge Planning    Patient plan of care discussed at interdisciplinary rounds: Yes    Anticipated Discharge Disposition: Transitional Care     Anticipated Discharge Services: None  Anticipated Discharge DME: None    Patient/family educated on Medicare website which has current facility and service quality ratings: yes  Education Provided on the Discharge Plan: yes  Patient/Family in Agreement with the Plan: yes    Referrals Placed by CM/SW: Post Acute Facilities  Private pay costs discussed: not at this time    Additional Information:  Pt's son Davie [phone] called with more TCU preferences. Referrals sent to additional facilities.     10:41 AM  Grzegorz from [facility] called to get more information on Pt. Grzegorz stated she will review referral. If Pt is accepted, Pt may be able to discharge tomorrow.     3:44 PM  Pt declined by [facility], [facility] [place], and [facility] [place]. Followed up on pending referrals. CM to follow up.    RAJ Madrid Intern

## 2022-04-14 NOTE — PROGRESS NOTES
[facility] ACUTE PAIN SERVICE    ([facility], [facility], [facility])  Daily PAIN Progress Note    Assessment/Plan:  Tonya Yang is a 81 year old female who was admitted on 4/7/2022.  Pain team was asked to see the patient for chronic back pain, weakness, falls. Admitted for generalized weakness, edema, fall at TCU without injury and displeased with TCU care. History of HTN, obesity, DMII, polyarthralgia, chronic back pain, severe spinal stenosis, on polypharmacy. Recently hospitalized at [facility] 3/7-3/13 for pneumonia, severe sepsis, acute on chronic low back pain received steroid injection discharged to TCU, pain team followed during this hospitalization. Currently rates pain at 6/10 in lower back that radiates up to neck and bilateral shoulders, aching and shooting, limits mobility. Waiting for TCU placement. Care management following.       PLAN:   1) Pain consistent with chronic low back pain, weakness and falls likely due to polypharmacy. Would not recommend concurrent use of benzodiazepines with opioids given risk for overdose. Discussed intrathecal pain pump again- patient and son hopeful this will help chronic pain. Discussed periods of increased drowsiness and confusion reported by nursing- less likely from Robaxin, recommend decreasing lyrica. Discussed minimizing oxycodone use and optimizing non-opioid options.    2)Multimodal Medication Therapy  Topical:  Lidocaine patch 4% and lidocaine ointment 5%  Adjuvants: Tyenol  975 mgTID, Gabapentin discontinued and replaced with Lyrica 25 mg po bid 4/9/22- possibly contributing to intermittent drowsiness/confusion. Recommend decreasing Lyrica to 25 mg PO daily at bedtime, change this today  NSAIDs: Cr increased to 1.25 4/14. Hx of CKD -recommend avoiding NSAIDs, Hospitalist has ordered meloxicam 15 mg daily (benefit vs risk).  Patient is also on prednisone 6mg daily.   Muscle Relaxants: Robaxin 500 mg q6h prn    Antidepressants/anxiolytics: Duloxetine 60 mg daily, Lamictal 100 mg bid. Ativan and valium stopped 4/9/22.   Has been on Cymbalta for pain since 2012 with minimal benefit- consider a taper off that as well but defer to Hospital Medicine Service. Given her hx of bipolar disorder, Cymbalta may be the wrong med for her- hospitalist restarted 4/13/22  Opioids: Oxycodone 5-10 mg po q6h prn- discussed minimizing use and keeping prn (not scheduled), may consider 5 mg for moderate pain (5-7/10) and 10 mg for severe (8-10/10)  3)Non-medication interventions- Ice, Heat, physical therapy, OT lymphedema wraps  4)Constipation Prophylaxis- senna and Miralax      -Opioid prescriber has been Ananya Mclean  [place]ed from system on 04/09/22. Last refill on 4/4. This indicated ongoing opioid use. 6 total number of prescribing providers noted.   Discharge Recommendations - We recommend prescribing the following at the time of discharge: it is recommended she seek consult from her previous pain clinic ([facility]). Social work consult for outpatient mental health   -[facility] Appointment Wednesday 4/13 at 1:15 OR [facility] on 5/10/22 at 9:20 am  - Referral to [facility] pharmacist for polypharmacy         Subjective:  Son at bedside. Patient in recliner this morning, does not appear drowsy or confused. Tearful about past experience at TCU and concerns for her health and social situation going forward. Provided active listening, reassurance, answered questions, and offered  services, social work already following. Patient reports pain 6/10 and exacerbated with movement. Patient concerned about oxycodone not being scheduled every 4h. We discussed using non-opioid therapies for mild-moderate pain and using oxycodone prn only for severe pain to minimize narcotics. We discussed IT pump as a possibility in the future- patient and son to follow-up at Pain clinic. We discussed lowering lyrica dose for concerns of  intermittent drowsiness and confusion and continuing Robaxin for LLE spasms.        Total time spent 41 minutes with greater than 50% in consultation, education and coordination of care.     Also discussed with RN, time spent in discussion with pharmacist.  We discussed the pain treatment plan and med changes.        AYAD Martin, RN, CNS Student  Lorraine Smith, Char  Acute Care Pain Management Program Hour 7a-1700  [facility] ([facility])   Page via [employer]-  call [phone]

## 2022-04-14 NOTE — PLAN OF CARE
No acute changes or events this shift. Patient awake early this morning with increased anxiety. She is concerned that she is not understanding all information provided by the hospitalist and would like them to contact her son for updates. 0300 blood sugar check performed per nursing judgement and was 80. VSS on room air. Managing generalized pain this morning with prn oxy, rest, repositioning and quiet environment.Hopeful to transition to TCU once medically cleared. SW/CM following.

## 2022-04-14 NOTE — PROGRESS NOTES
PRIMARY DIAGNOSIS: GENERALIZED WEAKNESS    OUTPATIENT/OBSERVATION GOALS TO BE MET BEFORE DISCHARGE  1. Orthostatic performed: NA     2. Tolerating PO medications: Yes    3. Return to near baseline physical activity: Yes    4. Cleared for discharge by consultants (if involved): Yes    Discharge Planner Nurse   Safe discharge environment identified: Yes  Barriers to discharge: Yes. Awaiting TCU placement.        Entered by: Jessica Yan 04/13/2022 9:48 PM

## 2022-04-15 ENCOUNTER — APPOINTMENT (OUTPATIENT)
Dept: OCCUPATIONAL THERAPY | Facility: CLINIC | Age: 82
End: 2022-04-15
Payer: MEDICARE

## 2022-04-15 VITALS
SYSTOLIC BLOOD PRESSURE: 112 MMHG | OXYGEN SATURATION: 94 % | HEIGHT: 65 IN | BODY MASS INDEX: 33.04 KG/M2 | HEART RATE: 70 BPM | WEIGHT: 198.3 LBS | DIASTOLIC BLOOD PRESSURE: 58 MMHG | RESPIRATION RATE: 16 BRPM | TEMPERATURE: 97.4 F

## 2022-04-15 LAB
GLUCOSE BLDC GLUCOMTR-MCNC: 112 MG/DL (ref 70–99)
GLUCOSE BLDC GLUCOMTR-MCNC: 170 MG/DL (ref 70–99)
SARS-COV-2 RNA RESP QL NAA+PROBE: NEGATIVE

## 2022-04-15 PROCEDURE — 250N000013 HC RX MED GY IP 250 OP 250 PS 637: Performed by: FAMILY MEDICINE

## 2022-04-15 PROCEDURE — 96372 THER/PROPH/DIAG INJ SC/IM: CPT

## 2022-04-15 PROCEDURE — 96372 THER/PROPH/DIAG INJ SC/IM: CPT | Performed by: FAMILY MEDICINE

## 2022-04-15 PROCEDURE — 99217 PR OBSERVATION CARE DISCHARGE: CPT | Performed by: INTERNAL MEDICINE

## 2022-04-15 PROCEDURE — 97535 SELF CARE MNGMENT TRAINING: CPT | Mod: GO

## 2022-04-15 PROCEDURE — 250N000013 HC RX MED GY IP 250 OP 250 PS 637: Performed by: PAIN MEDICINE

## 2022-04-15 PROCEDURE — 250N000012 HC RX MED GY IP 250 OP 636 PS 637: Performed by: INTERNAL MEDICINE

## 2022-04-15 PROCEDURE — 82962 GLUCOSE BLOOD TEST: CPT

## 2022-04-15 PROCEDURE — G0378 HOSPITAL OBSERVATION PER HR: HCPCS

## 2022-04-15 PROCEDURE — 250N000011 HC RX IP 250 OP 636: Performed by: FAMILY MEDICINE

## 2022-04-15 PROCEDURE — 99214 OFFICE O/P EST MOD 30 MIN: CPT | Performed by: PAIN MEDICINE

## 2022-04-15 PROCEDURE — 250N000013 HC RX MED GY IP 250 OP 250 PS 637: Performed by: INTERNAL MEDICINE

## 2022-04-15 PROCEDURE — 250N000013 HC RX MED GY IP 250 OP 250 PS 637: Performed by: NURSE PRACTITIONER

## 2022-04-15 PROCEDURE — 87635 SARS-COV-2 COVID-19 AMP PRB: CPT | Performed by: INTERNAL MEDICINE

## 2022-04-15 RX ORDER — FUROSEMIDE 20 MG
20 TABLET ORAL EVERY EVENING
Qty: 30 TABLET | Refills: 0 | Status: ON HOLD | OUTPATIENT
Start: 2022-04-15 | End: 2022-05-31

## 2022-04-15 RX ORDER — DULOXETIN HYDROCHLORIDE 60 MG/1
60 CAPSULE, DELAYED RELEASE ORAL DAILY
Status: DISCONTINUED | OUTPATIENT
Start: 2022-04-16 | End: 2022-04-15 | Stop reason: HOSPADM

## 2022-04-15 RX ORDER — PREGABALIN 25 MG/1
25 CAPSULE ORAL AT BEDTIME
Qty: 10 CAPSULE | Refills: 0 | Status: ON HOLD | OUTPATIENT
Start: 2022-04-15 | End: 2022-05-31

## 2022-04-15 RX ORDER — POLYETHYLENE GLYCOL 3350 17 G/17G
17 POWDER, FOR SOLUTION ORAL DAILY
Qty: 510 G | Refills: 0 | Status: SHIPPED | OUTPATIENT
Start: 2022-04-15 | End: 2023-03-21

## 2022-04-15 RX ORDER — AMOXICILLIN 250 MG
1 CAPSULE ORAL 2 TIMES DAILY
Qty: 30 TABLET | Refills: 0 | Status: SHIPPED | OUTPATIENT
Start: 2022-04-15 | End: 2023-04-17

## 2022-04-15 RX ORDER — DULOXETIN HYDROCHLORIDE 20 MG/1
40 CAPSULE, DELAYED RELEASE ORAL DAILY
Status: DISCONTINUED | OUTPATIENT
Start: 2022-04-15 | End: 2022-04-15

## 2022-04-15 RX ORDER — POLYETHYLENE GLYCOL 3350 17 G/17G
17 POWDER, FOR SOLUTION ORAL 2 TIMES DAILY
Status: DISCONTINUED | OUTPATIENT
Start: 2022-04-15 | End: 2022-04-15 | Stop reason: HOSPADM

## 2022-04-15 RX ORDER — METHOCARBAMOL 500 MG/1
500 TABLET, FILM COATED ORAL EVERY 12 HOURS PRN
Status: DISCONTINUED | OUTPATIENT
Start: 2022-04-15 | End: 2022-04-15 | Stop reason: HOSPADM

## 2022-04-15 RX ORDER — PREGABALIN 25 MG/1
25 CAPSULE ORAL AT BEDTIME
Qty: 30 CAPSULE | Refills: 0 | Status: SHIPPED | OUTPATIENT
Start: 2022-04-15 | End: 2022-04-15

## 2022-04-15 RX ORDER — AMOXICILLIN 250 MG
1 CAPSULE ORAL 2 TIMES DAILY
Status: DISCONTINUED | OUTPATIENT
Start: 2022-04-15 | End: 2022-04-15 | Stop reason: HOSPADM

## 2022-04-15 RX ORDER — METHOCARBAMOL 500 MG/1
500 TABLET, FILM COATED ORAL EVERY 12 HOURS PRN
Status: ON HOLD
Start: 2022-04-15 | End: 2022-05-31

## 2022-04-15 RX ADMIN — DULOXETINE HYDROCHLORIDE 40 MG: 20 CAPSULE, DELAYED RELEASE ORAL at 09:07

## 2022-04-15 RX ADMIN — LAMOTRIGINE 100 MG: 100 TABLET ORAL at 09:12

## 2022-04-15 RX ADMIN — OXYCODONE HYDROCHLORIDE 10 MG: 5 TABLET ORAL at 02:12

## 2022-04-15 RX ADMIN — INSULIN ASPART 1 UNITS: 100 INJECTION, SOLUTION INTRAVENOUS; SUBCUTANEOUS at 12:20

## 2022-04-15 RX ADMIN — PREDNISONE 6 MG: 1 TABLET ORAL at 09:12

## 2022-04-15 RX ADMIN — POLYETHYLENE GLYCOL 3350 17 G: 17 POWDER, FOR SOLUTION ORAL at 09:07

## 2022-04-15 RX ADMIN — DICLOFENAC 4 G: 10 GEL TOPICAL at 12:32

## 2022-04-15 RX ADMIN — SENNOSIDES AND DOCUSATE SODIUM 1 TABLET: 50; 8.6 TABLET ORAL at 09:12

## 2022-04-15 RX ADMIN — HEPARIN SODIUM 5000 UNITS: 5000 INJECTION, SOLUTION INTRAVENOUS; SUBCUTANEOUS at 12:20

## 2022-04-15 RX ADMIN — ACETAMINOPHEN 975 MG: 325 TABLET ORAL at 09:11

## 2022-04-15 RX ADMIN — GLIPIZIDE 10 MG: 10 TABLET ORAL at 07:35

## 2022-04-15 RX ADMIN — EMPAGLIFLOZIN 25 MG: 25 TABLET, FILM COATED ORAL at 09:08

## 2022-04-15 RX ADMIN — CARVEDILOL 3.12 MG: 3.12 TABLET, FILM COATED ORAL at 09:11

## 2022-04-15 RX ADMIN — LISINOPRIL 5 MG: 5 TABLET ORAL at 09:09

## 2022-04-15 RX ADMIN — FERROUS SULFATE TAB 325 MG (65 MG ELEMENTAL FE) 325 MG: 325 (65 FE) TAB at 09:15

## 2022-04-15 RX ADMIN — Medication 1000 MCG: at 09:12

## 2022-04-15 RX ADMIN — CHOLECALCIFEROL TAB 125 MCG (5000 UNIT) 150 MCG: 125 TAB at 09:10

## 2022-04-15 RX ADMIN — PANTOPRAZOLE SODIUM 40 MG: 20 TABLET, DELAYED RELEASE ORAL at 09:08

## 2022-04-15 RX ADMIN — METFORMIN HYDROCHLORIDE 500 MG: 500 TABLET, FILM COATED ORAL at 11:07

## 2022-04-15 RX ADMIN — HEPARIN SODIUM 5000 UNITS: 5000 INJECTION, SOLUTION INTRAVENOUS; SUBCUTANEOUS at 00:32

## 2022-04-15 RX ADMIN — FUROSEMIDE 40 MG: 40 TABLET ORAL at 09:11

## 2022-04-15 RX ADMIN — OXYCODONE HYDROCHLORIDE 10 MG: 5 TABLET ORAL at 09:29

## 2022-04-15 NOTE — PLAN OF CARE
"PRIMARY DIAGNOSIS: \"GENERIC\" NURSING  OUTPATIENT/OBSERVATION GOALS TO BE MET BEFORE DISCHARGE:  1. ADLs back to baseline: yes    2. Activity and level of assistance: up with 1A    3. Pain status: Improved-controlled with oral pain medications.    4. Return to near baseline physical activity: Yes     Discharge Planner Nurse   Safe discharge environment identified: No  Barriers to discharge: Yes - TCU placement       Entered by: Emani Lazo 04/14/2022 8:57 PM     Please review provider order for any additional goals.   Nurse to notify provider when observation goals have been met and patient is ready for discharge.    "

## 2022-04-15 NOTE — PROGRESS NOTES
"PRIMARY DIAGNOSIS: \"GENERIC\" NURSING  OUTPATIENT/OBSERVATION GOALS TO BE MET BEFORE DISCHARGE:  1. ADLs back to baseline: Yes    2. Activity and level of assistance: Up with 1x assist    3. Pain status: Improved-controlled with oral pain medications.    4. Return to near baseline physical activity: Yes     Discharge Planner Nurse   Safe discharge environment identified: No  Barriers to discharge: Yes, Awaiting TCU Placement       Entered by: Juan Gomez 04/15/2022 10:46 AM       "

## 2022-04-15 NOTE — PROGRESS NOTES
"PRIMARY DIAGNOSIS: \"GENERIC\" NURSING  OUTPATIENT/OBSERVATION GOALS TO BE MET BEFORE DISCHARGE:  1. ADLs back to baseline: Yes    2. Activity and level of assistance: Up with standby 1x    3. Pain status: Improved-controlled with oral pain medications.    4. Return to near baseline physical activity: Yes     Discharge Planner Nurse   Safe discharge environment identified: Yes  Barriers to discharge: No       Entered by: Juan Gomez 04/15/2022 1:22 PM     Pt to discharge to [facility] @1500  "

## 2022-04-15 NOTE — PLAN OF CARE
"Goal Outcome Evaluation:    PRIMARY DIAGNOSIS: \"GENERIC\" NURSING  OUTPATIENT/OBSERVATION GOALS TO BE MET BEFORE DISCHARGE:  1. ADLs back to baseline: Yes    2. Activity and level of assistance: Up with Ax1    3. Pain status: Improved-controlled with oral pain medications.    4. Return to near baseline physical activity: Yes     Discharge Planner Nurse   Safe discharge environment identified: No  Barriers to discharge: Yes, TCU placement.        Entered by: Ade Mcdonough 04/15/2022 5:30 AM     Please review provider order for any additional goals.   Nurse to notify provider when observation goals have been met and patient is ready for discharge.    Continues to complain of increased edema in RLE. Pt reports it feels heavy and \"different from LLE\". Continuing to encourage elevation. Pain controlled with PRN oxy. Ambulating Ax1. Will continue to monitor.   "

## 2022-04-15 NOTE — PROGRESS NOTES
[facility] ACUTE PAIN SERVICE    ([facility], [facility], [facility])  Daily PAIN Progress Note    Assessment/Plan:  Tonya Yang is a 81 year old female who was admitted on 4/7/2022.  I was asked to see the patient for chronic pain. Admitted for chronic back pain, weakness, and fall without injury. History of HTN, obesity, DM 2, polyarthralgia, chronic pain, severe spinal stenosis, on polypharmacy. Pain years ago and has failed multiple trials of different medications including opioids, failed gabapentin, continues on Lyrica with minimal benefit, not a suitable candidate for NSAIDs given CKD, and also failed steroid injections.  Offered patient follow-up with neuro pain clinic for evaluation of intrathecal pain pump.    PLAN:   1) chronic pain in the setting of moderate opioid tolerance.  Has had periods of increased drowsiness and confusion likely from polypharmacy.  Lyrica has been decreased.  Can continue with oxycodone although would utilize 7.5 mg dose.  And would stop and Sayed meloxicam due to CKD.  2)Multimodal Medication Therapy  Topical: lidocaine and diclofenac   Adjuvants: Lyrica 25mg could discontinue this, APAP TID, stop meloxicam   Antidepressants/anxiolytics:continue cymbalta 60mg  Opioids:  Oxycodone7.5mg PRN  3)Non-medication interventions- Ice and PT   4)Constipation Prophylaxis- miralax now - no BM in 4 days  5) Follow up / Discharge plan: Follow up with John next Tuesday           Subjective:    Mara reports that her pain is unchanged.  It is present in the back.  Better when at rest.  Worse when standing.  Difficult to even brush her teeth due to pain.  She would prefer no changes be made to Cymbalta given her history of bipolar disorder she feels this regimen has been working well for her.  She feels no benefit with Lyrica and would be willing to stop this.  We talked about holding meloxicam and she states she does not know what that medication is.  She tells me she is not  had a bowel movement in 4 days.  She is open to taking MiraLAX today.  She declined a suppository.  Again we discussed in great detail the follow-up plan with neuro next Tuesday.  She would prefer to avoid meeting with the [facility] in May.  Her preference is to continue with neuro.  She states she will talk about this with her son first.  She is worried about transportation and needs a wheelchair for this.  Actually her son works as a .  He is familiar with how to transport medically disabled patients.           <principal problem not specified>   Patient Active Problem List   Diagnosis     Postmenopausal atrophic vaginitis     IBS (irritable bowel syndrome)     Meniere's disease (cochlear hydrops) - on prednisone and triamterene hydrochlorothiazide     GERD (gastroesophageal reflux disease)     CKD (chronic kidney disease) stage 3, GFR 30-59 ml/min (H)     Health Care Home     Iron deficiency anemia due to chronic blood loss     Deafness     Abscess,& FB in liver in 4-10 & 4-11 w recurrent pain in RUQ  in 7-13 s/po lap I&D and unroofing sans finding of an FB  in 11-13      Elevated liver enzymes since 12-13 liver abscess I&D     Temporomandibular joint disorder     Bipolar disorder, in full remission, most recent episode depressed (H)     Diarrhea r/o exacerbated by metformin -since 30's     Baker's cyst, left     Lower urinary tract infectious disease     Left-sided low back pain with left-sided sciatica since 1993 w reoccurrence 6-15      Meniere's disease (cochlear hydrops), LT     Steroid dependent for cochlear hydrops  since 1985      Bilateral back pain     Primary insomnia     Mixed hyperlipidemia     Localized edema-L>R lat malleolus     Microalbuminuria     Leukocytosis w Lt shift      Long-term (current) use of anticoagulants [Z79.01]     Essential hypertension     Ankle edema     Sensorineural hearing loss, bilateral     Chronic pain syndrome-issue of broken controlled  sub agreement      Bilateral leg edema worsening w her increasing inactivity      Muscle weakness (generalized) worse since 11-16 w drowsiness     Type 2 diabetes mellitus with diabetic nephropathy, without long-term current use of insulin (H)     Class 2 obesity due to excess calories with serious comorbidity and body mass index (BMI) of 37.0 to 37.9 in adult     Bilateral deafness     Confusion     Hyperglycemia     Acute renal failure, unspecified acute renal failure type (H)     Pneumonia of right lower lobe due to infectious organism     Severe sepsis (H)     Type 2 diabetes mellitus with hyperglycemia (H)     Glycosuria     Paravertebral mass     Abnormal finding on CT scan     Bilateral hearing loss, unspecified hearing loss type     Spasm of back muscles     Neural foraminal stenosis of cervical spine     Multilevel spondylosis     Anxiety and depression     Slow transit constipation     Weakness     Fall, initial encounter        History   Drug Use No     Comment: used Vir-Sec in the past         Tobacco Use      Smoking status: Former Smoker        Types: Cigarettes        Quit date: 11/15/1987        Years since quittin.4      Smokeless tobacco: Former User          acetaminophen  975 mg Oral TID     atorvastatin  10 mg Oral At Bedtime     carvedilol  3.125 mg Oral BID w/meals     cyanocobalamin  1,000 mcg Sublingual Daily     DULoxetine  40 mg Oral Daily     emollient   Topical TID     empagliflozin  25 mg Oral Daily     ferrous sulfate  325 mg Oral Daily     furosemide  40 mg Oral BID     glipiZIDE  10 mg Oral BID AC     heparin ANTICOAGULANT  5,000 Units Subcutaneous Q12H     insulin aspart  1-7 Units Subcutaneous TID AC     iron sucrose  300 mg Intravenous Once     lamoTRIgine  100 mg Oral BID     lisinopril  5 mg Oral Daily     metFORMIN  500 mg Oral BID w/meals     miconazole   Topical BID     mineral oil-hydrophilic petrolatum   Topical TID     mirabegron  25 mg Oral At Bedtime      "pantoprazole  40 mg Oral Daily     polyethylene glycol  17 g Oral BID     predniSONE  6 mg Oral Daily     pregabalin  25 mg Oral At Bedtime     senna-docusate  1 tablet Oral BID     triamcinolone   Topical BID     vitamin D3  150 mcg Oral Daily       Objective:  Vital signs in last 24 hours:  B/P: 141/64, T: 97.9, P: 76, R: 18   Blood pressure (!) 141/64, pulse 76, temperature 97.9  F (36.6  C), temperature source Oral, resp. rate 18, height 1.651 m (5' 5\"), weight 89.9 kg (198 lb 4.8 oz), SpO2 91 %, not currently breastfeeding.      Weight:   Wt Readings from Last 2 Encounters:   04/15/22 89.9 kg (198 lb 4.8 oz)   04/04/22 88.4 kg (194 lb 12.8 oz)           Intake/Output:    Intake/Output Summary (Last 24 hours) at 4/15/2022 1128  Last data filed at 4/15/2022 0930  Gross per 24 hour   Intake 1110 ml   Output 2100 ml   Net -990 ml        Review of Systems:   As per subjective, all others negative.    Physical Exam:     General Appearance:  Alert, cooperative, no distress,  Up in chair    Head:  Normocephalic, without obvious abnormality, atraumatic   Eyes:  PERRL, conjunctiva/corneas clear, EOM's intact   ENT/Throat: Lips dry    Lymph/Neck: Supple, symmetrical, trachea midline, no adenopathy, thyroid: not enlarged, symmetric    Lungs:    , respirations unlabored   Chest Wall:  No tenderness or deformity   Cardiovascular/Heart:  Regular rate and rhythm, S1, S2 normal,no murmur, rub or gallop.     Abdomen:   Soft, non-tender, no bm in 4 days    Musculoskeletal: Extremities with some edema    Skin: Skin color pale   Neurologic: Alert and oriented X 3, Moves all 4 extremities                Imaging:  Personally Reviewed.       Lab Results:  Personally Reviewed.   Last Comprehensive Metabolic Panel:  Sodium   Date Value Ref Range Status   04/14/2022 143 136 - 145 mmol/L Final   10/24/2017 139 133 - 144 mmol/L Final     Potassium   Date Value Ref Range Status   04/14/2022 3.5 3.5 - 5.0 mmol/L Final   10/24/2017 3.9 3.4 - " 5.3 mmol/L Final     Chloride   Date Value Ref Range Status   04/14/2022 99 98 - 107 mmol/L Final   10/24/2017 101 94 - 109 mmol/L Final     Carbon Dioxide   Date Value Ref Range Status   10/24/2017 28 20 - 32 mmol/L Final     Carbon Dioxide (CO2)   Date Value Ref Range Status   04/14/2022 28 22 - 31 mmol/L Final     Anion Gap   Date Value Ref Range Status   04/14/2022 16 5 - 18 mmol/L Final   10/24/2017 10 3 - 14 mmol/L Final     Glucose   Date Value Ref Range Status   04/14/2022 113 70 - 125 mg/dL Final   10/24/2017 116 (H) 70 - 99 mg/dL Final     GLUCOSE BY METER POCT   Date Value Ref Range Status   04/15/2022 112 (H) 70 - 99 mg/dL Final     Urea Nitrogen   Date Value Ref Range Status   04/14/2022 38 (H) 8 - 28 mg/dL Final   10/24/2017 18 7 - 30 mg/dL Final     Creatinine   Date Value Ref Range Status   04/14/2022 1.25 (H) 0.60 - 1.10 mg/dL Final   10/24/2017 0.84 0.52 - 1.04 mg/dL Final     GFR Estimate   Date Value Ref Range Status   04/14/2022 44 (L) >60 mL/min/1.73m2 Final     Comment:     Effective December 21, 2021 eGFRcr in adults is calculated using the 2021 CKD-EPI creatinine equation which includes age and gender (Teena et al., NEJ, DOI: 10.1056/OKCFwy3453852)   12/29/2020 >60 >60 mL/min/1.73m2 Final   10/24/2017 66 >60 mL/min/1.7m2 Final     Comment:     Non  GFR Calc     Calcium   Date Value Ref Range Status   04/14/2022 9.7 8.5 - 10.5 mg/dL Final   10/24/2017 9.5 8.5 - 10.1 mg/dL Final        UA: No results found for: UAMP, UBARB, BENZODIAZEUR, UCANN, UCOC, OPIT, UPCP         Total time spent 25 minutes with greater than 50% in consultation, education and coordination of care.     Also discussed with RN and Dr. Sutton-provided physician with update regarding recommended medication changes.      Jo ROMANO, CNS-BC, CNP, ACHPN  Acute Care Pain Management Program Hour 7a-1700  [facility] ([facility], [place], [place])   Page via [facility]- Click HERE to page Jo or call  [phone]

## 2022-04-15 NOTE — DISCHARGE SUMMARY
[facility] MEDICINE  DISCHARGE SUMMARY     Primary Care Physician: Ananya Mclean  Admission Date: 4/7/2022   Discharge Provider: Rebecca Sutton MD Discharge Date: 4/15/2022   Diet: Orders Placed This Encounter      High Consistent Carb (75 g CHO per Meal) Diet      Diet      Code Status: Full Code   Activity: activity as tolerated   [facility]      Condition at Discharge: Stable      REASON FOR PRESENTATION(See Admission Note for Details)   Fall    PRINCIPAL & ACTIVE DISCHARGE DIAGNOSES     Active Problems:  Generalized weakness    Fall, initial encounter  Acute on chronic lower extremity edema  Constipation  Tieia infection of the groin  Chronic low back pain  Polypharmacy  Hypertension  Diabetes mellitus type 2  Recently diagnosed incidental soft tissue finding on MRI, CT chest likely lymph node but needs follow-up CT chest in 6 months  Chronic anemia    SIGNIFICANT FINDINGS (Imaging, labs):       PENDING LABS         PROCEDURES ( this hospitalization only)          RECOMMENDATION FOR F/U VISIT       DISPOSITION     Skilled Nursing Facility    SUMMARY OF HOSPITAL COURSE:    81 year old old female Past medical history significant for hypertension, diabetes mellitus type 2, neuropathy, polyarthralgia, chronic low back pain, severe spinal stenosis recently hospitalized at [facility] 3/7-3/13 for pneumonia, severe sepsis, acute on chronic low back pain received steroid injection discharged to TCU presented to the ED with complaints of a fall.  She had a fall 2 days prior to admission when she lost her balance and fell onto her left elbow.  There was no loss of consciousness or prodromal symptoms or head trauma.  She complained of generalized pain and subsequently was brought into the ED.  Imaging was negative for any fractures.  She complained of worsening lower extremity edema and was treated with IV Lasix, home Lasix dose was increased.  Amlodipine discontinued.  She  has chronic low back pain and was seen by pain team with concerns of polypharmacy.  Multiple medications were discontinued.  Oxycodone dose was increased.  Started on topical diclofenac gel.  She has chronic anemia.  Iron studies were consistent with iron deficiency anemia.  Started on oral iron.  She is discharging to TCU for further rehabilitation.    Discharge Medications with Med changes:        Review of your medicines      START taking      Dose / Directions   diclofenac 1 % topical gel  Commonly known as: VOLTAREN  Used for: Chronic left-sided low back pain with left-sided sciatica      Dose: 4 g  Apply 4 g topically 4 times daily  Quantity: 150 g  Refills: 0     ferrous sulfate 325 (65 Fe) MG tablet  Commonly known as: FEROSUL      Dose: 325 mg  Take 1 tablet (325 mg) by mouth daily  Refills: 0     insulin aspart 100 UNIT/ML pen  Commonly known as: NovoLOG PEN      Correction Scale - MEDIUM INSULIN RESISTANCE DOSING     Do Not give Correction Insulin if Pre-Meal BG less than 140.   For Pre-Meal  - 189 give 1 unit.   For Pre-Meal  - 239 give 2 units.   For Pre-Meal  - 289 give 3 units.   For Pre-Meal  - 339 give 4 units.   For Pre-Meal - 399 give 5 units.   For Pre-Meal -449 give 6 units  For Pre-Meal BG greater than or equal to 450 give 7 units.   To be given with prandial insulin, and based on pre-meal blood glucose.    Notify provider if glucose greater than or equal to 350 mg/dL after administration of correction dose.  Quantity: 15 mL  Refills: 0     melatonin 1 MG Tabs tablet  Notes to patient: For sleep       Dose: 1 mg  Take 1 tablet (1 mg) by mouth nightly as needed for sleep  Refills: 0     miconazole 2 % external powder  Commonly known as: MICATIN  Notes to patient: For yeast rash       Apply topically 2 times daily To the skin fold  Refills: 0     mineral oil-hydrophilic petrolatum external ointment  Notes to patient: To dry skin       Apply topically 3 times  daily To the dry skin  Refills: 0     oxyCODONE HCl 7.5 MG Tabs  Used for: Chronic left-sided low back pain with left-sided sciatica      Dose: 7.5 mg  Take 7.5 mg by mouth every 6 hours as needed for moderate to severe pain  Quantity: 10 tablet  Refills: 0     polyethylene glycol 17 GM/Dose powder  Commonly known as: MIRALAX  Used for: Chronic left-sided low back pain with left-sided sciatica      Dose: 17 g  Take 17 g by mouth daily  Quantity: 510 g  Refills: 0     pregabalin 25 MG capsule  Commonly known as: LYRICA  Used for: Chronic left-sided low back pain with left-sided sciatica      Dose: 25 mg  Take 1 capsule (25 mg) by mouth At Bedtime  Quantity: 30 capsule  Refills: 0     senna-docusate 8.6-50 MG tablet  Commonly known as: SENOKOT-S/PERICOLACE  Used for: Chronic left-sided low back pain with left-sided sciatica      Dose: 1 tablet  Take 1 tablet by mouth 2 times daily  Quantity: 30 tablet  Refills: 0        CONTINUE these medicines which may have CHANGED, or have new prescriptions. If we are uncertain of the size of tablets/capsules you have at home, strength may be listed as something that might have changed.      Dose / Directions   * furosemide 40 MG tablet  Commonly known as: LASIX  Indication: Edema, Weight gain  This may have changed:     medication strength    how much to take  Notes to patient: Resume home schedule       Dose: 40 mg  Take 1 tablet (40 mg) by mouth in the morning.  Refills: 0     * furosemide 20 MG tablet  Commonly known as: LASIX  Indication: Edema, Weight gain  This may have changed: You were already taking a medication with the same name, and this prescription was added. Make sure you understand how and when to take each.  Used for: Bilateral leg edema      Dose: 20 mg  Take 1 tablet (20 mg) by mouth every evening 2 pm  Quantity: 30 tablet  Refills: 0     methocarbamol 500 MG tablet  Commonly known as: ROBAXIN  This may have changed:     when to take this    reasons to take  this  Used for: Chronic left-sided low back pain with left-sided sciatica      Dose: 500 mg  Take 1 tablet (500 mg) by mouth every 12 hours as needed for muscle spasms  Refills: 0         * This list has 2 medication(s) that are the same as other medications prescribed for you. Read the directions carefully, and ask your doctor or other care provider to review them with you.            CONTINUE these medicines which have NOT CHANGED      Dose / Directions   acetaminophen 325 MG tablet  Commonly known as: TYLENOL  Used for: Chronic midline low back pain with right-sided sciatica, Other chronic pain  Notes to patient: For pain       Dose: 325-650 mg  Take 1-2 tablets (325-650 mg) by mouth every 6 hours as needed for mild pain  Refills: 0     atorvastatin 10 MG tablet  Commonly known as: LIPITOR  Notes to patient: Resume home schedule       Dose: 10 mg  Take 10 mg by mouth At Bedtime  Refills: 0     blood glucose lancing device  Notes to patient: Resume home schedule       FOR TESTING ONCE DAILY. DX  E11.9 TYPE 2 DIABETES  Refills: 0     carvedilol 3.125 MG tablet  Commonly known as: COREG  Used for: Benign essential hypertension  Notes to patient: Resume home schedule       Dose: 3.125 mg  Take 1 tablet (3.125 mg) by mouth 2 times daily (with meals)  Refills: 0     * Contour Test test strip  Generic drug: blood glucose  Notes to patient: Resume home schedule       TESTING EVERY DAY DX  E11.9  Refills: 0     * Contour Next Test test strip  Generic drug: blood glucose  Notes to patient: Resume home schedule       TESTING EVERY DAY DX  E11.9  Refills: 0     cyanocobalamin 1000 MCG Subl sublingual tablet  Commonly known as: VITAMIN B-12  Notes to patient: Resume home schedule       Dose: 1,000 mcg  Place 1,000 mcg under the tongue daily  Refills: 0     DULoxetine 60 MG capsule  Commonly known as: CYMBALTA  Used for: Depressed bipolar I disorder in remission (H)  Notes to patient: Resume home schedule       TAKE 1 CAPSULE  (60 MG) BY MOUTH DAILY  Quantity: 90 capsule  Refills: 1     glipiZIDE 10 MG tablet  Commonly known as: GLUCOTROL  Used for: Type 2 diabetes mellitus with diabetic nephropathy, without long-term current use of insulin (H)      Dose: 10 mg  Take 1 tablet (10 mg) by mouth 2 times daily (before meals)  Refills: 0     Jardiance 25 MG Tabs tablet  Generic drug: empagliflozin  Notes to patient: Resume home schedule       Dose: 25 mg  Take 25 mg by mouth daily  Refills: 0     lamoTRIgine 100 MG tablet  Commonly known as: LaMICtal  Notes to patient: Resume home schedule       Dose: 100 mg  Take 100 mg by mouth 2 times daily  Refills: 0     lisinopril 5 MG tablet  Commonly known as: ZESTRIL  Notes to patient: Resume home schedule       Dose: 5 mg  Take 5 mg by mouth daily  Refills: 0     metFORMIN 500 MG tablet  Commonly known as: GLUCOPHAGE  Notes to patient: Resume home schedule       Dose: 500 mg  Take 500 mg by mouth 2 times daily (with meals)  Refills: 0     mirabegron 25 MG 24 hr tablet  Commonly known as: MYRBETRIQ  Indication: Urinary Incontinence  Notes to patient: Resume home schedule       Dose: 25 mg  Take 25 mg by mouth At Bedtime  Refills: 0     omeprazole 20 MG DR capsule  Commonly known as: priLOSEC  Notes to patient: Resume home schedule       Dose: 20 mg  Take 20 mg by mouth daily  Refills: 0     order for DME  Used for: Tenosynovitis, de Quervain  Notes to patient: Resume home schedule       Equipment being ordered: wrist brace  Quantity: 1 Device  Refills: 0     predniSONE 1 MG tablet  Commonly known as: DELTASONE  Notes to patient: Resume home schedule       Dose: 6 mg  Take 6 mg by mouth daily  Refills: 0     senna 8.6 MG tablet  Commonly known as: SENOKOT  Notes to patient: Resume home schedule       Dose: 2 tablet  Take 2 tablets by mouth 2 times daily as needed for constipation  Refills: 0     triamcinolone 0.1 % external cream  Commonly known as: KENALOG      Apply topically 2 times daily as  needed  Refills: 0     vitamin D3 50 mcg (2000 units) tablet  Commonly known as: CHOLECALCIFEROL  Notes to patient: Resume home schedule       Dose: 3 tablet  Take 3 tablets by mouth daily  Refills: 0         * This list has 2 medication(s) that are the same as other medications prescribed for you. Read the directions carefully, and ask your doctor or other care provider to review them with you.            STOP taking    amLODIPine 10 MG tablet  Commonly known as: NORVASC        diazepam 1 MG/ML solution  Commonly known as: VALIUM        fluconazole 150 MG tablet  Commonly known as: DIFLUCAN        gabapentin 100 MG capsule  Commonly known as: NEURONTIN        guaiFENesin-dextromethorphan 100-10 MG/5ML syrup  Commonly known as: ROBITUSSIN DM        meloxicam 7.5 MG tablet  Commonly known as: MOBIC        metoclopramide 10 MG tablet  Commonly known as: REGLAN        nystatin 018101 UNIT/GM external powder  Commonly known as: MYCOSTATIN        oxyCODONE-acetaminophen 5-325 MG tablet  Commonly known as: PERCOCET        Salonpas Pain Rel Gel-Ptch Hot 0.025-1.25 % Ptch  Generic drug: Capsaicin-Menthol        triamterene-HCTZ 37.5-25 MG tablet  Commonly known as: MAXZIDE-25              Where to get your medicines      These medications were sent to [id number] - [place], [place] - [address] AT [facility] [address]  [address] [place] [address]    Hours: 24-hours Phone: [phone]     diclofenac 1 % topical gel    furosemide 20 MG tablet    polyethylene glycol 17 GM/Dose powder    pregabalin 25 MG capsule    senna-docusate 8.6-50 MG tablet     Some of these will need a paper prescription and others can be bought over the counter. Ask your nurse if you have questions.    Bring a paper prescription for each of these medications    oxyCODONE HCl 7.5 MG Tabs              Rationale for medication changes:    Lasix dose increased from 20-40 in the morning and added 20 mg evening  dose  Triamterene hydrochlorothiazide was stopped  Topical pain meds  Topical antifungals for groin infection        Consults   Pharmacy pain team      Immunizations given this encounter     [unfilled]      Discharge Procedure Orders   Pain Management Referral   Standing Status: Future   Referral Priority: Routine: Next available opening Referral Type: Consultation   Requested Specialty: Pain Medicine   Number of Visits Requested: 1     Med Therapy Management Referral   Referral Priority: Routine: Next available opening Referral Type: Med Therapy Management   Requested Specialty: Pharmacist   Number of Visits Requested: 1     General info for SNF   Order Comments: Length of Stay Estimate: Short Term Care: Estimated # of Days <30  Condition at Discharge: Stable  Level of care:skilled   Rehabilitation Potential: Good  Admission H&P remains valid and up-to-date: Yes  Recent Chemotherapy: N/A  Use Nursing Home Standing Orders: Yes     Mantoux instructions   Order Comments: Give two-step Mantoux (PPD) Per Facility Policy  yes, if no test was done recently     Reason for your hospital stay   Order Comments: Generalized weakness, back pain, recent fall     Glucose monitor nursing POCT   Order Comments: Before meals and at bedtime     Activity - Up ad timothy     Order Specific Question Answer Comments   Is discharge order? Yes      Follow Up and recommended labs and tests   Order Comments: Follow-up with TCU physician in next rounding  Follow-up with primary MD in 1 week after discharge from TCU  Follow-up at chronic pain clinic as scheduled  Follow-up with neurosurgery as scheduled  Lymphedema therapy in bilateral lower extremity  CBC and BMP in 1 week  Follow-up chest CT in 6-month due to incidental finding of lesion on previous chest CT scan     Full Code     Order Specific Question Answer Comments   Code status determined by: Discussion with patient/ legal decision maker      Physical Therapy Adult Consult   Order  "Comments: Evaluate and treat as clinically indicated.    Reason:  weakness     Occupational Therapy Adult Consult   Order Comments: Evaluate and treat as clinically indicated.    Reason:  weakness     Fall precautions     Diet   Order Comments: Follow this diet upon discharge: diabetic, low salt diet     Order Specific Question Answer Comments   Is discharge order? Yes      Examination     Vital Signs in last 24 hours:   Vital signs:  Temp: 97.4  F (36.3  C) Temp src: Oral BP: 112/58 Pulse: 70   Resp: 16 SpO2: 94 % O2 Device: None (Room air)   Height: 165.1 cm (5' 5\") Weight: 89.9 kg (198 lb 4.8 oz)  Estimated body mass index is 33 kg/m  as calculated from the following:    Height as of this encounter: 1.651 m (5' 5\").    Weight as of this encounter: 89.9 kg (198 lb 4.8 oz).      Please see EMR for more detailed significant labs, imaging, consultant notes etc.  Total time spent on discharge: > 35 minutes    Rebecca Sutton MD   [facility] Hospitalist Service: Ph:[phone]    CC:Ananya Mclean      "

## 2022-04-15 NOTE — PLAN OF CARE
Problem: Plan of Care - These are the overarching goals to be used throughout the patient stay.    Goal: Plan of Care Review/Shift Note  Description: The Plan of Care Review/Shift note should be completed every shift.  The Outcome Evaluation is a brief statement about your assessment that the patient is improving, declining, or no change.  This information will be displayed automatically on your shift note.  Outcome: Ongoing, Progressing     Patient A&O x4. Pain 6-7/10; declined PRN oxycodone this shift. Up with 1 assist. Pt had two small bowel movements but is concerned about constipation. Alarm on for safety.

## 2022-04-15 NOTE — PLAN OF CARE
"PRIMARY DIAGNOSIS: \"GENERIC\" NURSING  OUTPATIENT/OBSERVATION GOALS TO BE MET BEFORE DISCHARGE:  1. ADLs back to baseline: Yes    2. Activity and level of assistance: Up with Ax1    3. Pain status: Improved-controlled with oral pain medications.    4. Return to near baseline physical activity: Yes     Discharge Planner Nurse   Safe discharge environment identified: No  Barriers to discharge: Yes, TCU placement       Entered by: Ade Mcdonough 04/15/2022 3:43 AM     Please review provider order for any additional goals.   Nurse to notify provider when observation goals have been met and patient is ready for discharge.    Pt complaining of increased swelling in RLE. Lymph wraps in place, but RLE appears to be a little more edematous than LLE. No extremity tenderness, color is WDL, and pedal pulses present. Encouraged to elevate both extremities on pillows while in bed.   "

## 2022-04-15 NOTE — PROGRESS NOTES
Care Management Discharge Note    Discharge Date: 04/15/2022       Discharge Disposition: Transitional Care    Discharge Services:  (rehab)    Discharge DME: None    Discharge Transportation: medical wheelchair    Private pay costs discussed: transportation costs    PAS Confirmation Code:  (none needed)  Patient/family educated on Medicare website which has current facility and service quality ratings: yes    Education Provided on the Discharge Plan: yes Persons Notified of Discharge Plans: yes  Patient/Family in Agreement with the Plan: yes    Handoff Referral Completed: Yes    Additional Information:  4/15/2022 No return calls from TCU admissions yet. Received message [facility] would not have a TCU bed until possibly Mon 4/18. Called and left messages for TCU admissions.   Requested return call to [phone].    [facility]  - declined; currently full  [facility]- return call; no beds available until Tu 4/19  [facility]: return call - able to accept today, 4/15/2022     Spoke with patient- due to hardness of hearing, wrote out info for patient on piece of paper. Patient had several questions as her son, Davie had left to go to work so he would not be able to give her a ride to TCU. Patient requested thi writer to call her son and update.     Called and spoke with patient's son, Davie- he is in agreement with [facility]. Discussed medical transport wheelchair since he works until later this evening.  In agreement with potential cost for medical transport if not covered by insurance.     Called and set up wheelchair ride with Verito with [employer] [facility] @ 1500.   Text page sent to Hospitalist.   Called and updated Nigel in TCU admissions - they are able to do lymphedema therapy if needed. Updated re: ride  time.     No PAS needed since patient was readmitted from TCU.  Updated [place] [facility] and left message for Nigel V RN.     Updated patient  due to hardness of hearing with pen and paper. Patient seemed nervous and complaining to this writer about needing help to pack up her belongings and not being able to talk with her son to get him to bring her items from home. Relayed message from her son that he is planning on visiting her tomorrow at TCU. Patient was using ipad prior to this writer giving her written info to read. Hopefully she will be able to reach her son via ipad with the list of things to bring her tomorrow at the TCU. Updated Charleen MERINO and Nigel RN.     Called and left message for admissions @ [facility] re: patient's request for pen and paper to assist with communicating with staff.  Requested return call to [phone] if questions.     Called and updated patient's son. Davie re: ride time and patient' request for items from home. Based n comment from son about returning to [facility], encouraged son to talk with facility director of nursing or  if further concerns about TCU. Was able to reassure patient's son, Davie that this writer had passed along patient's request for pen and paper to TCU admissions to hopefully facilitate communication with TCU staff.

## 2022-04-15 NOTE — PLAN OF CARE
Lymphedema Discharge Summary    Reason for therapy discharge:    Discharged to transitional care facility.    Progress towards therapy goal(s). See goals on Care Plan in [facility] electronic health record for goal details.  Goals partially met.  Barriers to achieving goals:   discharge from facility.    Therapy recommendation(s):    Continued therapy is recommended.  Rationale/Recommendations:  Continue lymphedema therapy at TCU; short stretch bandages or tubigrip to BLE as needed. Wrapping instructions located in discharge orders.    Leigh Ann Mayes OTR/L

## 2022-04-15 NOTE — PROGRESS NOTES
Occupational Therapy Discharge Summary    Reason for therapy discharge:    Discharged to transitional care facility.    Progress towards therapy goal(s). See goals on Care Plan in [facility] electronic health record for goal details.  Goals partially met.  Barriers to achieving goals:   discharge from facility.    Therapy recommendation(s):    Continue home exercise program.

## 2022-04-16 ENCOUNTER — PATIENT OUTREACH (OUTPATIENT)
Dept: CARE COORDINATION | Facility: CLINIC | Age: 82
End: 2022-04-16
Payer: MEDICARE

## 2022-04-16 DIAGNOSIS — Z71.89 OTHER SPECIFIED COUNSELING: ICD-10-CM

## 2022-04-16 NOTE — PROGRESS NOTES
Physical Therapy Discharge Summary    Reason for therapy discharge:    Discharged to transitional care facility.    Progress towards therapy goal(s). See goals on Care Plan in [facility] electronic health record for goal details.  Goals not met.  Barriers to achieving goals:   discharge from facility.    Therapy recommendation(s):    Continued therapy is recommended.  Rationale/Recommendations:  TCU.

## 2022-04-16 NOTE — PROGRESS NOTES
Clinic Care Coordination Contact    Background: Care Coordination referral placed from [facility] discharge report for reason of patient meeting criteria for a TCM outreach call by [facility] team.    Assessment: Upon chart review, CCR[facility] Team member will cancel/close the referral for TCM outreach due to reason below:    Patient has discharged to a Group home, Memory Care or Nursing Home    Plan: Care Coordination referral for TCM outreach canceled.    Sonia Baldwin MA  [facility], [facility]

## 2022-04-16 NOTE — PROGRESS NOTES
Patient discharged to TCU 4/15/22 afternoon. Call received from patients son Davie today. Davie reported he removed patient from TCU last evening around 11pm as TCU reported they did not have patents pain medications available until today.    Patient relations phone number given to son Davie.

## 2022-04-22 ENCOUNTER — HOSPITAL ENCOUNTER (OUTPATIENT)
Facility: CLINIC | Age: 82
Setting detail: OBSERVATION
Discharge: HOME HEALTH | End: 2022-04-25
Attending: EMERGENCY MEDICINE | Admitting: NURSE PRACTITIONER
Payer: MEDICARE

## 2022-04-22 ENCOUNTER — APPOINTMENT (OUTPATIENT)
Dept: GENERAL RADIOLOGY | Facility: CLINIC | Age: 82
End: 2022-04-22
Attending: EMERGENCY MEDICINE
Payer: MEDICARE

## 2022-04-22 ENCOUNTER — APPOINTMENT (OUTPATIENT)
Dept: CT IMAGING | Facility: CLINIC | Age: 82
End: 2022-04-22
Attending: EMERGENCY MEDICINE
Payer: MEDICARE

## 2022-04-22 DIAGNOSIS — R53.1 ASTHENIA: ICD-10-CM

## 2022-04-22 DIAGNOSIS — E87.6 HYPOPOTASSEMIA: ICD-10-CM
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
## 2022-04-22 DIAGNOSIS — S30.0XXA CONTUSION OF COCCYX, INITIAL ENCOUNTER: ICD-10-CM

## 2022-04-22 DIAGNOSIS — R53.1 WEAKNESS: Primary | ICD-10-CM

## 2022-04-22 DIAGNOSIS — S70.00XA CONTUSION OF HIP, UNSPECIFIED LATERALITY, INITIAL ENCOUNTER: ICD-10-CM

## 2022-04-22 DIAGNOSIS — M54.50 LOW BACK PAIN WITHOUT SCIATICA, UNSPECIFIED BACK PAIN LATERALITY, UNSPECIFIED CHRONICITY: ICD-10-CM

## 2022-04-22 DIAGNOSIS — E87.6 HYPOKALEMIA: ICD-10-CM

## 2022-04-22 DIAGNOSIS — Z11.52 ENCOUNTER FOR SCREENING LABORATORY TESTING FOR SEVERE ACUTE RESPIRATORY SYNDROME CORONAVIRUS 2 (SARS-COV-2): ICD-10-CM

## 2022-04-22 DIAGNOSIS — S57.00XA: ICD-10-CM
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
## 2022-04-22 LAB
ALBUMIN SERPL-MCNC: 3.4 G/DL (ref 3.4–5)
ALBUMIN UR-MCNC: NEGATIVE MG/DL
ALP SERPL-CCNC: 88 U/L (ref 40–150)
ALT SERPL W P-5'-P-CCNC: 28 U/L (ref 0–50)
ANION GAP SERPL CALCULATED.3IONS-SCNC: 8 MMOL/L (ref 3–14)
APPEARANCE UR: CLEAR
AST SERPL W P-5'-P-CCNC: 16 U/L (ref 0–45)
ATRIAL RATE - MUSE: 78 BPM
BACTERIA #/AREA URNS HPF: ABNORMAL /HPF
BASOPHILS # BLD AUTO: 0.1 10E3/UL (ref 0–0.2)
BASOPHILS NFR BLD AUTO: 1 %
BILIRUB SERPL-MCNC: 0.4 MG/DL (ref 0.2–1.3)
BILIRUB UR QL STRIP: NEGATIVE
BUN SERPL-MCNC: 30 MG/DL (ref 7–30)
CALCIUM SERPL-MCNC: 9.6 MG/DL (ref 8.5–10.1)
CHLORIDE BLD-SCNC: 100 MMOL/L (ref 94–109)
CK SERPL-CCNC: 111 U/L (ref 30–225)
CO2 SERPL-SCNC: 29 MMOL/L (ref 20–32)
COLOR UR AUTO: ABNORMAL
CREAT SERPL-MCNC: 1.14 MG/DL (ref 0.52–1.04)
DIASTOLIC BLOOD PRESSURE - MUSE: NORMAL MMHG
EOSINOPHIL # BLD AUTO: 0.5 10E3/UL (ref 0–0.7)
EOSINOPHIL NFR BLD AUTO: 6 %
ERYTHROCYTE [DISTWIDTH] IN BLOOD BY AUTOMATED COUNT: 18.8 % (ref 10–15)
GFR SERPL CREATININE-BSD FRML MDRD: 49 ML/MIN/1.73M2
GLUCOSE BLD-MCNC: 201 MG/DL (ref 70–99)
GLUCOSE BLDC GLUCOMTR-MCNC: 195 MG/DL (ref 70–99)
GLUCOSE BLDC GLUCOMTR-MCNC: 202 MG/DL (ref 70–99)
GLUCOSE UR STRIP-MCNC: >=1000 MG/DL
HCT VFR BLD AUTO: 36.3 % (ref 35–47)
HGB BLD-MCNC: 9.9 G/DL (ref 11.7–15.7)
HGB UR QL STRIP: NEGATIVE
HOLD SPECIMEN: NORMAL
IMM GRANULOCYTES # BLD: 0 10E3/UL
IMM GRANULOCYTES NFR BLD: 0 %
INTERPRETATION ECG - MUSE: NORMAL
KETONES UR STRIP-MCNC: NEGATIVE MG/DL
LEUKOCYTE ESTERASE UR QL STRIP: NEGATIVE
LYMPHOCYTES # BLD AUTO: 2.2 10E3/UL (ref 0.8–5.3)
LYMPHOCYTES NFR BLD AUTO: 26 %
MCH RBC QN AUTO: 23.1 PG (ref 26.5–33)
MCHC RBC AUTO-ENTMCNC: 27.3 G/DL (ref 31.5–36.5)
MCV RBC AUTO: 85 FL (ref 78–100)
MONOCYTES # BLD AUTO: 0.7 10E3/UL (ref 0–1.3)
MONOCYTES NFR BLD AUTO: 8 %
NEUTROPHILS # BLD AUTO: 5.1 10E3/UL (ref 1.6–8.3)
NEUTROPHILS NFR BLD AUTO: 59 %
NITRATE UR QL: NEGATIVE
NRBC # BLD AUTO: 0 10E3/UL
NRBC BLD AUTO-RTO: 0 /100
P AXIS - MUSE: 35 DEGREES
PH UR STRIP: 5 [PH] (ref 5–7)
PLATELET # BLD AUTO: 334 10E3/UL (ref 150–450)
POTASSIUM BLD-SCNC: 3.3 MMOL/L (ref 3.4–5.3)
PR INTERVAL - MUSE: 146 MS
PROT SERPL-MCNC: 6.9 G/DL (ref 6.8–8.8)
QRS DURATION - MUSE: 152 MS
QT - MUSE: 502 MS
QTC - MUSE: 572 MS
R AXIS - MUSE: 15 DEGREES
RBC # BLD AUTO: 4.28 10E6/UL (ref 3.8–5.2)
RBC URINE: <1 /HPF
SARS-COV-2 RNA RESP QL NAA+PROBE: NEGATIVE
SODIUM SERPL-SCNC: 137 MMOL/L (ref 133–144)
SP GR UR STRIP: 1.02 (ref 1–1.03)
SQUAMOUS EPITHELIAL: 1 /HPF
SYSTOLIC BLOOD PRESSURE - MUSE: NORMAL MMHG
T AXIS - MUSE: 10 DEGREES
TRANSITIONAL EPI: <1 /HPF
TROPONIN I SERPL HS-MCNC: 7 NG/L
UROBILINOGEN UR STRIP-MCNC: NORMAL MG/DL
VENTRICULAR RATE- MUSE: 78 BPM
WBC # BLD AUTO: 8.6 10E3/UL (ref 4–11)
WBC URINE: 4 /HPF

## 2022-04-22 PROCEDURE — 93005 ELECTROCARDIOGRAM TRACING: CPT | Performed by: EMERGENCY MEDICINE

## 2022-04-22 PROCEDURE — 84484 ASSAY OF TROPONIN QUANT: CPT | Performed by: EMERGENCY MEDICINE

## 2022-04-22 PROCEDURE — G0378 HOSPITAL OBSERVATION PER HR: HCPCS

## 2022-04-22 PROCEDURE — 250N000012 HC RX MED GY IP 250 OP 636 PS 637: Performed by: NURSE PRACTITIONER

## 2022-04-22 PROCEDURE — 36415 COLL VENOUS BLD VENIPUNCTURE: CPT | Performed by: EMERGENCY MEDICINE

## 2022-04-22 PROCEDURE — 93010 ELECTROCARDIOGRAM REPORT: CPT | Performed by: EMERGENCY MEDICINE

## 2022-04-22 PROCEDURE — C9803 HOPD COVID-19 SPEC COLLECT: HCPCS | Performed by: EMERGENCY MEDICINE

## 2022-04-22 PROCEDURE — 80053 COMPREHEN METABOLIC PANEL: CPT | Performed by: EMERGENCY MEDICINE

## 2022-04-22 PROCEDURE — 99285 EMERGENCY DEPT VISIT HI MDM: CPT | Mod: 25 | Performed by: EMERGENCY MEDICINE

## 2022-04-22 PROCEDURE — G1004 CDSM NDSC: HCPCS

## 2022-04-22 PROCEDURE — 82962 GLUCOSE BLOOD TEST: CPT

## 2022-04-22 PROCEDURE — 72170 X-RAY EXAM OF PELVIS: CPT | Mod: 26 | Performed by: RADIOLOGY

## 2022-04-22 PROCEDURE — 250N000013 HC RX MED GY IP 250 OP 250 PS 637: Performed by: NURSE PRACTITIONER

## 2022-04-22 PROCEDURE — U0005 INFEC AGEN DETEC AMPLI PROBE: HCPCS | Performed by: EMERGENCY MEDICINE

## 2022-04-22 PROCEDURE — 99218 PR INITIAL OBSERVATION CARE,LEVEL I: CPT | Performed by: NURSE PRACTITIONER

## 2022-04-22 PROCEDURE — 72170 X-RAY EXAM OF PELVIS: CPT

## 2022-04-22 PROCEDURE — 81001 URINALYSIS AUTO W/SCOPE: CPT | Performed by: EMERGENCY MEDICINE

## 2022-04-22 PROCEDURE — G1004 CDSM NDSC: HCPCS | Mod: GC | Performed by: RADIOLOGY

## 2022-04-22 PROCEDURE — 96372 THER/PROPH/DIAG INJ SC/IM: CPT | Performed by: NURSE PRACTITIONER

## 2022-04-22 PROCEDURE — 72220 X-RAY EXAM SACRUM TAILBONE: CPT

## 2022-04-22 PROCEDURE — 74176 CT ABD & PELVIS W/O CONTRAST: CPT | Mod: 26 | Performed by: RADIOLOGY

## 2022-04-22 PROCEDURE — 250N000013 HC RX MED GY IP 250 OP 250 PS 637: Performed by: EMERGENCY MEDICINE

## 2022-04-22 PROCEDURE — 72220 X-RAY EXAM SACRUM TAILBONE: CPT | Mod: 26 | Performed by: RADIOLOGY

## 2022-04-22 PROCEDURE — 82550 ASSAY OF CK (CPK): CPT | Performed by: EMERGENCY MEDICINE

## 2022-04-22 PROCEDURE — 85025 COMPLETE CBC W/AUTO DIFF WBC: CPT | Performed by: EMERGENCY MEDICINE

## 2022-04-22 RX ORDER — LISINOPRIL 5 MG/1
5 TABLET ORAL DAILY
Status: DISCONTINUED | OUTPATIENT
Start: 2022-04-23 | End: 2022-04-25 | Stop reason: HOSPADM

## 2022-04-22 RX ORDER — POTASSIUM CHLORIDE 750 MG/1
20 TABLET, EXTENDED RELEASE ORAL DAILY
Status: DISCONTINUED | OUTPATIENT
Start: 2022-04-23 | End: 2022-04-25 | Stop reason: HOSPADM

## 2022-04-22 RX ORDER — PREGABALIN 25 MG/1
25 CAPSULE ORAL AT BEDTIME
Status: DISCONTINUED | OUTPATIENT
Start: 2022-04-22 | End: 2022-04-25 | Stop reason: HOSPADM

## 2022-04-22 RX ORDER — ACETAMINOPHEN 650 MG/1
650 SUPPOSITORY RECTAL EVERY 4 HOURS PRN
Status: DISCONTINUED | OUTPATIENT
Start: 2022-04-22 | End: 2022-04-25 | Stop reason: HOSPADM

## 2022-04-22 RX ORDER — CARVEDILOL 3.12 MG/1
3.12 TABLET ORAL 2 TIMES DAILY WITH MEALS
Status: DISCONTINUED | OUTPATIENT
Start: 2022-04-22 | End: 2022-04-25 | Stop reason: HOSPADM

## 2022-04-22 RX ORDER — METHOCARBAMOL 500 MG/1
500 TABLET, FILM COATED ORAL EVERY 12 HOURS PRN
Status: DISCONTINUED | OUTPATIENT
Start: 2022-04-22 | End: 2022-04-25 | Stop reason: HOSPADM

## 2022-04-22 RX ORDER — MIRABEGRON 25 MG/1
25 TABLET, FILM COATED, EXTENDED RELEASE ORAL AT BEDTIME
Status: DISCONTINUED | OUTPATIENT
Start: 2022-04-22 | End: 2022-04-25 | Stop reason: HOSPADM

## 2022-04-22 RX ORDER — NICOTINE POLACRILEX 4 MG
15-30 LOZENGE BUCCAL
Status: DISCONTINUED | OUTPATIENT
Start: 2022-04-22 | End: 2022-04-25 | Stop reason: HOSPADM

## 2022-04-22 RX ORDER — OXYCODONE AND ACETAMINOPHEN 5; 325 MG/1; MG/1
1 TABLET ORAL ONCE
Status: COMPLETED | OUTPATIENT
Start: 2022-04-22 | End: 2022-04-22

## 2022-04-22 RX ORDER — GLIPIZIDE 10 MG/1
10 TABLET ORAL
Status: DISCONTINUED | OUTPATIENT
Start: 2022-04-22 | End: 2022-04-25 | Stop reason: HOSPADM

## 2022-04-22 RX ORDER — ACETAMINOPHEN 325 MG/1
650 TABLET ORAL EVERY 4 HOURS PRN
Status: DISCONTINUED | OUTPATIENT
Start: 2022-04-22 | End: 2022-04-25 | Stop reason: HOSPADM

## 2022-04-22 RX ORDER — POLYETHYLENE GLYCOL 3350 17 G/17G
17 POWDER, FOR SOLUTION ORAL DAILY
Status: DISCONTINUED | OUTPATIENT
Start: 2022-04-22 | End: 2022-04-25 | Stop reason: HOSPADM

## 2022-04-22 RX ORDER — LAMOTRIGINE 100 MG/1
100 TABLET ORAL 2 TIMES DAILY
Status: DISCONTINUED | OUTPATIENT
Start: 2022-04-22 | End: 2022-04-25 | Stop reason: HOSPADM

## 2022-04-22 RX ORDER — DEXTROSE MONOHYDRATE 25 G/50ML
25-50 INJECTION, SOLUTION INTRAVENOUS
Status: DISCONTINUED | OUTPATIENT
Start: 2022-04-22 | End: 2022-04-25 | Stop reason: HOSPADM

## 2022-04-22 RX ORDER — AMOXICILLIN 250 MG
2 CAPSULE ORAL 2 TIMES DAILY
Status: DISCONTINUED | OUTPATIENT
Start: 2022-04-22 | End: 2022-04-25 | Stop reason: HOSPADM

## 2022-04-22 RX ORDER — ONDANSETRON 2 MG/ML
4 INJECTION INTRAMUSCULAR; INTRAVENOUS EVERY 6 HOURS PRN
Status: DISCONTINUED | OUTPATIENT
Start: 2022-04-22 | End: 2022-04-25 | Stop reason: HOSPADM

## 2022-04-22 RX ORDER — DULOXETIN HYDROCHLORIDE 60 MG/1
60 CAPSULE, DELAYED RELEASE ORAL DAILY
Status: DISCONTINUED | OUTPATIENT
Start: 2022-04-23 | End: 2022-04-25 | Stop reason: HOSPADM

## 2022-04-22 RX ORDER — LIDOCAINE 4 G/G
1 PATCH TOPICAL
Status: DISCONTINUED | OUTPATIENT
Start: 2022-04-23 | End: 2022-04-25 | Stop reason: HOSPADM

## 2022-04-22 RX ORDER — ACETAMINOPHEN 325 MG/1
325-650 TABLET ORAL EVERY 6 HOURS PRN
Status: DISCONTINUED | OUTPATIENT
Start: 2022-04-22 | End: 2022-04-22

## 2022-04-22 RX ORDER — ATORVASTATIN CALCIUM 10 MG/1
10 TABLET, FILM COATED ORAL AT BEDTIME
Status: DISCONTINUED | OUTPATIENT
Start: 2022-04-22 | End: 2022-04-25 | Stop reason: HOSPADM

## 2022-04-22 RX ORDER — ONDANSETRON 4 MG/1
4 TABLET, ORALLY DISINTEGRATING ORAL EVERY 6 HOURS PRN
Status: DISCONTINUED | OUTPATIENT
Start: 2022-04-22 | End: 2022-04-25 | Stop reason: HOSPADM

## 2022-04-22 RX ORDER — PANTOPRAZOLE SODIUM 40 MG/1
40 TABLET, DELAYED RELEASE ORAL DAILY
Status: DISCONTINUED | OUTPATIENT
Start: 2022-04-23 | End: 2022-04-25 | Stop reason: HOSPADM

## 2022-04-22 RX ORDER — OXYCODONE HYDROCHLORIDE 5 MG/1
5 TABLET ORAL EVERY 4 HOURS PRN
Status: DISCONTINUED | OUTPATIENT
Start: 2022-04-22 | End: 2022-04-25 | Stop reason: HOSPADM

## 2022-04-22 RX ORDER — FERROUS SULFATE 325(65) MG
325 TABLET ORAL DAILY
Status: DISCONTINUED | OUTPATIENT
Start: 2022-04-23 | End: 2022-04-25 | Stop reason: HOSPADM

## 2022-04-22 RX ADMIN — PREGABALIN 25 MG: 25 CAPSULE ORAL at 22:06

## 2022-04-22 RX ADMIN — OXYCODONE HYDROCHLORIDE 5 MG: 5 TABLET ORAL at 19:48

## 2022-04-22 RX ADMIN — POTASSIUM BICARBONATE 50 MEQ: 977.5 TABLET, EFFERVESCENT ORAL at 11:42

## 2022-04-22 RX ADMIN — GLIPIZIDE 10 MG: 10 TABLET ORAL at 19:38

## 2022-04-22 RX ADMIN — OXYCODONE HYDROCHLORIDE AND ACETAMINOPHEN 1 TABLET: 5; 325 TABLET ORAL at 12:06

## 2022-04-22 RX ADMIN — CARVEDILOL 3.12 MG: 3.12 TABLET, FILM COATED ORAL at 19:42

## 2022-04-22 RX ADMIN — PREDNISONE 6 MG: 1 TABLET ORAL at 19:39

## 2022-04-22 RX ADMIN — ATORVASTATIN CALCIUM 10 MG: 10 TABLET, FILM COATED ORAL at 22:06

## 2022-04-22 RX ADMIN — SENNOSIDES AND DOCUSATE SODIUM 2 TABLET: 8.6; 5 TABLET ORAL at 19:37

## 2022-04-22 RX ADMIN — METHOCARBAMOL 500 MG: 500 TABLET ORAL at 19:48

## 2022-04-22 RX ADMIN — POLYETHYLENE GLYCOL 3350 17 G: 17 POWDER, FOR SOLUTION ORAL at 19:38

## 2022-04-22 RX ADMIN — MIRABEGRON 25 MG: 25 TABLET, FILM COATED, EXTENDED RELEASE ORAL at 22:06

## 2022-04-22 RX ADMIN — METFORMIN HYDROCHLORIDE 500 MG: 500 TABLET, FILM COATED ORAL at 19:38

## 2022-04-22 RX ADMIN — LAMOTRIGINE 100 MG: 100 TABLET ORAL at 19:37

## 2022-04-22 RX ADMIN — INSULIN ASPART 2 UNITS: 100 INJECTION, SOLUTION INTRAVENOUS; SUBCUTANEOUS at 19:39

## 2022-04-22 ASSESSMENT — ACTIVITIES OF DAILY LIVING (ADL): DEPENDENT_IADLS:: CLEANING;COOKING;LAUNDRY;SHOPPING;MEAL PREPARATION;MEDICATION MANAGEMENT;TRANSPORTATION

## 2022-04-22 ASSESSMENT — ENCOUNTER SYMPTOMS
BRUISES/BLEEDS EASILY: 0
FEVER: 0
COLOR CHANGE: 0
BACK PAIN: 1
ADENOPATHY: 0
DIFFICULTY URINATING: 0
COUGH: 0
HEADACHES: 0
POLYDIPSIA: 0
NECK STIFFNESS: 0
SHORTNESS OF BREATH: 0
CHEST TIGHTNESS: 0
NAUSEA: 0
ABDOMINAL PAIN: 0
CONFUSION: 0
HEMATURIA: 0
ARTHRALGIAS: 0
VOMITING: 0
EYE REDNESS: 0
NECK PAIN: 0
CHILLS: 0

## 2022-04-22 NOTE — DISCHARGE INSTRUCTIONS
[employer] [facility] (Ph: [phone]) - The Senior LinkAge Line is a service of the [place] [employer]ging in partnership with [place]'s area agencies on aging.    Senior Nutrition Program  The Senior Nutrition Program includes dining sites for older adults and home-delivered meals in communities throughout [place]. These dining sites also offer social activities like:    Volunteer opportunities  Programs about health and nutrition  Information about other services like grocery shopping and delivery.  People age 60 or older can participate in the Senior Nutrition Program, and their spouses can also participate, even if they are under 60. Participants can contribute toward the cost of meals provided as they are able.    To find a senior dining site in your area or learn about home-delivered meals, call the [employer] LinkAge Line at [phone].     Support Services  Many communities have programs and services specifically for people who need help with transportation, meals, bathing, and more. These services may be useful to you down the road -- or if you re caring for a relative or friend. Services can be provided by your local agency on aging, or other public or nonprofit agencies, as well as for profit companies. Fees vary by service and location, but some may be free or offered on a sliding scale, depending on your income.    The senior linkage line can help connect you to these additional resources:    Adult day services are provided at a community setting where people come for several hours a day to receive medical, social, and recreation services. They are usaually offered during the daytime, which helps people who may be caring for a loved one while working.  Assisted transportation helps people get to appointments and other necessary places. It can include [id number] van service, discount taxi programs, and volunteer drivers and escorts.  Caregiver services can include respite (a break from  caregiving), information, referrals to services, and training or support groups.  Care assessment and management, most often by a nurse or , can help assess a person s needs, develop a plan of care, and arrange and monitor services.  Friendly visitors are volunteers who stop by regularly to see how you re doing.  Home care services provide help with personal care like bathing or getting dressed.  Home health care includes nursing and physical, speech or occupational therapy for a specific condition.  Homemaker/chore services help with housekeeping and preparing meals, or chores like mowing the lawn and shoveling snow.  Information and assistance specialists provide information and connect you to local resources and services.  Meals may be delivered to your home or served in a senior center or community facility.  Senior centers offer meals, recreation, classes, information and referral services, volunteer opportunities, employment services, public benefits counseling, and much more.

## 2022-04-22 NOTE — LETTER
[facility] [address] OBSERVATION [address] [address]  [phone]    FACSIMILE TRANSMITTAL SHEET       From:  OTILIA Craft, [facility] ED/Observation  [facility]  Phone: [phone]  Pager: [phone]    Confidentiality Notice: The document(s) accompanying this fax may contain protected health information under state and federal law and is legally privileged. The information is only for the use of the intended recipient named above. The recipient or person responsible for delivering this information is prohibited by law from disclosing this information without proper authorization to any other party, unless required to do so by law or regulation. If you are not the intended recipient, you are hereby notified that any review, dissemination, distribution, or copying of this message is strictly prohibited. If you have received this communication in error, please notify us immediately by telephone to arrange for the return or destruction of the faxed document. Thank you

## 2022-04-22 NOTE — CONSULTS
Care Management Initial Consult    General Information  Assessment completed with: Children, Davie - Son  Type of CM/SW Visit: Initial Assessment    Primary Care Provider verified and updated as needed: Yes   Readmission within the last 30 days: current reason for admission unrelated to previous admission   Return Category: Medically unsuccessful treatment plan  Reason for Consult: discharge planning  Advance Care Planning: Advance Care Planning Reviewed: no concerns identified          Communication Assessment  Patient's communication style: spoken language (English or Bilingual)    Hearing Difficulty or Deaf: yes   Wear Glasses or Blind: no    Cognitive  Cognitive/Neuro/Behavioral:                        Living Environment:   People in home: alone     Current living Arrangements: apartment      Able to return to prior arrangements: other (see comments) (TBD)  Living Arrangement Comments: Davie is providing assistance and support for pt, but reports the level of assistance is not sustainable.    Family/Social Support:  Care provided by: child(pamela)  Provides care for: no one, unable/limited ability to care for self     Children          Description of Support System: Supportive, Involved    Support Assessment: Lacks necessary supervision and assistance, Lacks adequate physical care, Adequate social supports    Current Resources:   Patient receiving home care services:       Community Resources: Home Care  Equipment currently used at home:    Supplies currently used at home:      Employment/Financial:  Employment Status: retired        Financial Concerns: No concerns identified   Referral to Financial Worker: No       Lifestyle & Psychosocial Needs:  Social Determinants of Health     Tobacco Use: Medium Risk     Smoking Tobacco Use: Former Smoker     Smokeless Tobacco Use: Former User   Alcohol Use: Not on file   Financial Resource Strain: Not on file   Food Insecurity: Not on file   Transportation Needs: Not on file  "  Physical Activity: Not on file   Stress: Not on file   Social Connections: Not on file   Intimate Partner Violence: Not on file   Depression: Not at risk     PHQ-2 Score: 0   Housing Stability: Not on file       Functional Status:  Prior to admission patient needed assistance:   Dependent ADLs:: Ambulation-walker, Bathing, Dressing, Transfers, Toileting  Dependent IADLs:: Cleaning, Cooking, Laundry, Shopping, Meal Preparation, Medication Management, Transportation       Mental Health Status:  Mental Health Status: No Current Concerns       Chemical Dependency Status:  Chemical Dependency Status: No Current Concerns             Values/Beliefs:  Spiritual, Cultural Beliefs, Christian Practices, Values that affect care: no               Additional Information:  Chart reviewed. Case discussed with bedside RN and medical provider.    Writer met with pt's son, Davie, to discuss his concerns with pt's current home situation. Davie reports that pt has had several bad previous experiences recently at [facility]. Davie expressed frustration that pt fell at [facility] and how they had long wait times and how pt did not have her medications available to her at [facility]. Writer provided emotional support - supportive listening, validation, and encouragement. Writer provided Davie with education and addressed expectations of response times in TCUs and at the SNFs. Davie emphasized throughout the conversation that he is open to pt go to a \"good\" TCU where he feels pt will get better. Writer explained to Davie that more referrals can be made, but many of the \"good\" facilities are popular, and stay full much of the time. Writer emphasized that referrals can be made to his preferred facilities, pt may also need to choose between going to a less high quality facility or going home with [facility], which Davie acknowledged.     Davie is currently providing pt with assistance with most of her ADLs, but reports he feels like he cannot sustain " "the level of assistance and requests more assistance to be in place. Davie reports he is currently applying for FMLA to take time off to help take care of pt. Davie estimates he's approximately 5hrs / day of assistance for pt. Writer explained that it can take a long time to get PCA services established. Davie acknowledged the length of time and that pt previously had services in place, but they ended due to pt being out of the community for 30 days.    Writer briefly discussed [facility] over there phone with Davie due to difficulties with pt's hearing. Davie reported he would not stop by until after 2000. Davie requested that writer allow him the chance to talk pt into considering TCU again, because currently pt is reporting she would preferreturn home.     Overall, pt has been placed at two SNFs recently and family has either had pt transferred to another facility or they  This may complicate discharge planning, as many SNFs may be concerned admitting an individual who has not stayed in any facility . Writer recommends that both TCU and HHC options be considered and discussed with pt and Davie for discharge planning. Writer notes during previous Hospitalization, Davie was very focused on a facility being \"4 or 5 stars\".    [facility] (P: [phone]) - SW to f/u on Monday to determine what services are actively being worked on being put in place.    No referrals have been made at this time d/t no PT evaluation at this time. The following facilities were identified by Davie as where he would want referrals made:    [facility]  [address]  [address]  P: [phone]  P: [phone] - Admissions  F: [phone]    [facility]  [address] [place]  [address]  P: [phone]  P: [phone] - Admissions  F: [phone]    [facility]  [address] [place]  [address]  P: [phone]  P: [phone] - " Admissions  F: [phone]    [facility]  [address] [place]  [address]  P: [phone]  F: [phone]    [facility]   [address] [place]  [address]  P: [phone]  P: [phone] - Admissions  F: [phone]    [facility]  [address]  [place]  [address]  P: [phone]  P: [phone] - Admissions  F: [phone]    [facility]  [address] [place] [address] [place], [place]  [address]  P: [phone]  P: [phone] - Admissions  F: [phone]    [facility]  [address] [place] [address] [place], [place]  [address]  P: [phone]  P: [phone] - Admissions  F: [phone]    [address]  [address]  P: [phone]  P: [phone] - Admissions  F: [phone]    [facility]  P: [phone]  P: [phone] - Admissions  F: [phone]    ________________    OTILIA Craft, [facility]  ED/Observation   [facility]  Phone: [phone]  Pager: [phone]  Fax: [phone]    On-call pager, [phone], 4:00pm to midnight

## 2022-04-22 NOTE — ED PROVIDER NOTES
[place] EMERGENCY DEPARTMENT ([facility])  4/22/22  History     Chief Complaint   Patient presents with     Social Work Services     Fall     The history is provided by the patient, medical records and a relative (son).     Tonya Yang is a 81 year old female with a past medical history significant for hypertension, diabetes mellitus type 2, neuropathy, polyarthralgia, chronic low back pain, severe spinal stenosis recently hospitalized at [facility] 3/7-3/13 for pneumonia, severe sepsis, and acute on chronic low back pain received steroid injection who presents to the Emergency Department for evaluation of injury sustained from fall.    Patient reports that today she was in her bathroom and was reaching for a towel when she had a fall resulting in her landing on her buttocks.  She states that since the fall she has constant, mild to moderate, nonradiating, sharp pain surrounding the tailbone area of her buttocks.  Movement makes the pain worse, rest improves it.  She adds that she is also experiencing increased left hand pain since the fall today.  She says that she had some mild left hand pain prior to the fall but since the fall this has worsened in intensity.  She adds that when she sits on a pillow it does help reduce some of the pain in her tailbone area.  She notes that she had to scoot in order to get to a phone to call her son who brought her to the ED today.  Patient denies head injury.  Patient denies use of anticoagulants.  Patient denies use of oxygen at home.  Patient reports that she does have history of pneumonia.    Per patient's son, patient was admitted to [facility] from 04/07/2022- 04/15/2022 for mechanical fall.  He states that she was found to have spinal stenosis that has been worsening.  He states that when the patient was released from the hospital her walking had improved.  He says that the patient was supposed to have her medications after discharge but these  medications were not ready and the patient has been having issues with receiving her proper prescriptions.  He notes that he does not stay with the patient and lives about 3 miles away.    Per chart review, patient was admitted to [facility] from 04/07/2022-04/15/2022 for evaluation of fall. Patient has history of hypertension, diabetes mellitus type 2, neuropathy, polyarthralgia, chronic low back pain, severe spinal stenosis recently hospitalized at [facility] 3/7-3/13 for pneumonia, severe sepsis, acute on chronic low back pain received steroid injection discharged to TCU presented to the ED with complaints of a fall.  She had a fall 2 days prior to admission when she lost her balance and fell onto her left elbow.  There was no loss of consciousness or prodromal symptoms or head trauma.  She complained of generalized pain and subsequently was brought into the ED.  Imaging was negative for any fractures.  She complained of worsening lower extremity edema and was treated with IV Lasix, home Lasix dose was increased.  Amlodipine discontinued. Multiple medications were discontinued.  Oxycodone dose was increased.  Started on topical diclofenac gel.  She has chronic anemia.  Iron studies were consistent with iron deficiency anemia.  Started on oral iron.  She was discharged to TCU for further rehabilitation on 04/15.     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     Hepatic abscess      Hyperlipidemia      Hypertension      Irritable bowel syndrome      Meniere disease      Neuropathy      Noninfectious ileitis      Peritoneal abscess  (H)      Spinal stenosis of lumbar region      Steroid long-term use      Vaginitis, atrophic, postmenopausal      Vitamin D deficiency        Past Surgical History:   Procedure Laterality Date     CATARACT IOL, RT/LT Left 2013     FOOT SURGERY      toe     GI SURGERY       IR LUMBAR/SACRAL TRANSFOR INJ BILATERAL Bilateral 3/11/2022     LAPAROSCOPIC HEPATECTOMY PARTIAL  2013    Procedure: LAPAROSCOPIC HEPATECTOMY PARTIAL;  Laparoscopic Debridement of Liver Abcess;  Surgeon: Sepideh Green MD;  Location: [facility] OR     ORTHOPEDIC SURGERY       PICC INSERTION  2013    5fr DL Power PICC, 41cm (1cm external length) in the R lateral brachial vein with tip in the SVC RA junction.     RECTAL SURGERY      1970s     VASCULAR SURGERY         Family History   Problem Relation Age of Onset     Cerebrovascular Disease Mother      Heart Disease Mother      Heart Disease Father      Heart Disease Brother      Diabetes No family hx of      Coronary Artery Disease No family hx of      Hypertension No family hx of      Hyperlipidemia No family hx of      Breast Cancer No family hx of      Colon Cancer No family hx of      Prostate Cancer No family hx of      Other Cancer No family hx of      Depression No family hx of      Anxiety Disorder No family hx of      Mental Illness No family hx of      Substance Abuse No family hx of      Anesthesia Reaction No family hx of      Asthma No family hx of      Osteoporosis No family hx of      Genetic Disorder No family hx of      Thyroid Disease No family hx of      Obesity No family hx of      Unknown/Adopted No family hx of        Social History     Tobacco Use     Smoking status: Former Smoker     Types: Cigarettes     Quit date: 11/15/1987     Years since quittin.4     Smokeless tobacco: Former User   Substance Use Topics     Alcohol use: Not Currently     Alcohol/week: 0.0 standard drinks     Comment: MALISSA white since        Current Facility-Administered  Medications   Medication     acetaminophen (TYLENOL) tablet 650 mg    Or     acetaminophen (TYLENOL) Suppository 650 mg     diclofenac (VOLTAREN) 1 % topical gel 2 g     [START ON 4/23/2022] Lidocaine (LIDOCARE) 4 % Patch 1 patch     lidocaine patch in PLACE     menthol (ICY HOT) 5 % patch 1 patch    And     menthol (ICY HOT) Patch in Place     Current Outpatient Medications   Medication     lamoTRIgine (LAMICTAL) 100 MG tablet     oxyCODONE 7.5 MG TABS     acetaminophen (TYLENOL) 325 MG tablet     atorvastatin (LIPITOR) 10 MG tablet     blood glucose (ACCU-CHEK FASTCLIX) lancing device     blood glucose (CONTOUR TEST) test strip     carvedilol (COREG) 3.125 MG tablet     CONTOUR NEXT TEST test strip     cyanocobalamin 1000 MCG SUBL     diclofenac (VOLTAREN) 1 % topical gel     DULoxetine (CYMBALTA) 60 MG EC capsule     ferrous sulfate (FEROSUL) 325 (65 Fe) MG tablet     furosemide (LASIX) 20 MG tablet     furosemide (LASIX) 40 MG tablet     glipiZIDE (GLUCOTROL) 10 MG tablet     insulin aspart (NOVOLOG PEN) 100 UNIT/ML pen     JARDIANCE 25 MG TABS tablet     lisinopril (ZESTRIL) 5 MG tablet     melatonin 1 MG TABS tablet     metFORMIN (GLUCOPHAGE) 500 MG tablet     methocarbamol (ROBAXIN) 500 MG tablet     miconazole (MICATIN) 2 % external powder     mineral oil-hydrophilic petrolatum (AQUAPHOR) external ointment     mirabegron (MYRBETRIQ) 25 MG 24 hr tablet     omeprazole (PRILOSEC) 20 MG DR capsule     order for DME     polyethylene glycol (MIRALAX) 17 GM/Dose powder     predniSONE (DELTASONE) 1 MG tablet     pregabalin (LYRICA) 25 MG capsule     senna (SENOKOT) 8.6 MG tablet     senna-docusate (SENOKOT-S/PERICOLACE) 8.6-50 MG tablet     triamcinolone (KENALOG) 0.1 % external cream     vitamin D3 (CHOLECALCIFEROL) 50 mcg (2000 units) tablet        Allergies   Allergen Reactions     Propofol Nausea and Vomiting     Shellfish Allergy      Other reaction(s): Dizziness     Capsaicin Rash and Blisters        I have  "reviewed the Medications, Allergies, Past Medical and Surgical History, and Social History in the Epic system.    Review of Systems   Constitutional: Negative for chills and fever.   HENT: Negative for congestion.    Eyes: Negative for redness.   Respiratory: Negative for cough, chest tightness and shortness of breath.    Cardiovascular: Negative for chest pain.   Gastrointestinal: Negative for abdominal pain, nausea and vomiting.   Endocrine: Negative for polydipsia and polyuria.   Genitourinary: Negative for difficulty urinating and hematuria.   Musculoskeletal: Positive for back pain ( Buttock pain). Negative for arthralgias, neck pain and neck stiffness.        Buttock pain   Skin: Negative for color change.   Allergic/Immunologic: Negative for immunocompromised state.   Neurological: Negative for headaches.   Hematological: Negative for adenopathy. Does not bruise/bleed easily.   Psychiatric/Behavioral: Negative for confusion.   All other systems reviewed and are negative.    A complete review of systems was performed with pertinent positives and negatives noted in the HPI, and all other systems negative.    Physical Exam   BP: (!) 145/68  Pulse: (!) 48  Temp: 97.6  F (36.4  C)  Resp: 20  Height: 165.1 cm (5' 5\")  Weight: 89.8 kg (198 lb)  SpO2: (!) 89 %      Physical Exam  Vitals and nursing note reviewed.   Constitutional:       General: She is not in acute distress.     Appearance: Normal appearance. She is not ill-appearing, toxic-appearing or diaphoretic.   HENT:      Head: Normocephalic and atraumatic.      Mouth/Throat:      Pharynx: No oropharyngeal exudate.   Eyes:      General: No scleral icterus.     Extraocular Movements: Extraocular movements intact.      Conjunctiva/sclera: Conjunctivae normal.      Pupils: Pupils are equal, round, and reactive to light.   Cardiovascular:      Rate and Rhythm: Normal rate.      Pulses: Normal pulses.           Dorsalis pedis pulses are 2+ on the right side and 2+ " on the left side.        Posterior tibial pulses are 2+ on the right side and 2+ on the left side.      Heart sounds: Normal heart sounds.   Pulmonary:      Effort: Pulmonary effort is normal. No respiratory distress.      Breath sounds: Normal breath sounds. No wheezing, rhonchi or rales.   Abdominal:      General: Abdomen is flat.      Palpations: Abdomen is soft.      Tenderness: There is no abdominal tenderness. There is no right CVA tenderness, left CVA tenderness, guarding or rebound.   Musculoskeletal:         General: Tenderness present. Normal range of motion.      Cervical back: Full passive range of motion without pain, normal range of motion and neck supple. No rigidity or tenderness. No pain with movement or spinous process tenderness. Normal range of motion.      Right lower leg: Edema ( 1+) present.      Left lower leg: Edema ( 1+) present.      Comments: There is no midline cervical, thoracic, or lumbar spinous process tenderness.  There is tenderness over coccyx.  No tenderness over hips.  Full range of motion, active and passive, and hips, knees, and ankles with pain in sacral/coccygeal region with range of motion.  No deformity, edema, or tenderness in left wrist.  There is no snuffbox tenderness to left wrist.   Skin:     General: Skin is warm.      Capillary Refill: Capillary refill takes less than 2 seconds.      Findings: No rash.   Neurological:      General: No focal deficit present.      Mental Status: She is alert and oriented to person, place, and time.      GCS: GCS eye subscore is 4. GCS verbal subscore is 5. GCS motor subscore is 6.      Cranial Nerves: Cranial nerves are intact. No cranial nerve deficit.      Sensory: Sensation is intact.      Motor: Motor function is intact.      Coordination: Coordination is intact. Coordination normal.   Psychiatric:         Mood and Affect: Mood normal.         Behavior: Behavior normal.         Thought Content: Thought content normal.          Judgment: Judgment normal.           ED Course     At 9:11 AM the patient was seen and examined by Mikki Moreno MD in Room ED07.        Procedures              EKG Interpretation:      Interpreted by Mikki Moreno MD  Time reviewed: 9:12 AM  Symptoms at time of EKG: Fall  Rhythm: Sinus, normal  Rate: normal  Axis: normal  Ectopy: none  Conduction: RBBB  ST Segments/ T Waves: No ST-T wave changes  Q Waves: none  Comparison to prior: Unchanged from old EKG done on April 7, 2022    Clinical Impression: Sinus rhythm with RBBB without acute ischemic changes               Results for orders placed or performed during the hospital encounter of 04/22/22 (from the past 24 hour(s))   EKG 12-lead   Result Value Ref Range    Systolic Blood Pressure  mmHg    Diastolic Blood Pressure  mmHg    Ventricular Rate 78 BPM    Atrial Rate 78 BPM    SD Interval 146 ms    QRS Duration 152 ms     ms    QTc 572 ms    P Axis 35 degrees    R AXIS 15 degrees    T Axis 10 degrees    Interpretation ECG       Sinus rhythm  Right bundle branch block  Abnormal ECG  Unconfirmed report - interpretation of this ECG is computer generated - see medical record for final interpretation    Confirmed by - EMERGENCY ROOM, PHYSICIAN ([id number]),  RONALD MANNING ([id number]) on 4/22/2022 3:33:01 PM     [place] Draw    Narrative    The following orders were created for panel order [place] Draw.  Procedure                               Abnormality         Status                     ---------                               -----------         ------                     Extra Blue Top Tube[[id number]]                              Final result               Extra Red Top Tube[[id number]]                               Final result               Extra Green Top (Lithium...[[id number]]                      Final result               Extra Purple Top Tube[[id number]]                            Final result                 Please view results for these tests on the  individual orders.   Troponin I   Result Value Ref Range    Troponin I High Sensitivity 7 <54 ng/L   CBC with platelets differential    Narrative    The following orders were created for panel order CBC with platelets differential.  Procedure                               Abnormality         Status                     ---------                               -----------         ------                     CBC with platelets and d...[[id number]]  Abnormal            Final result                 Please view results for these tests on the individual orders.   Comprehensive metabolic panel   Result Value Ref Range    Sodium 137 133 - 144 mmol/L    Potassium 3.3 (L) 3.4 - 5.3 mmol/L    Chloride 100 94 - 109 mmol/L    Carbon Dioxide (CO2) 29 20 - 32 mmol/L    Anion Gap 8 3 - 14 mmol/L    Urea Nitrogen 30 7 - 30 mg/dL    Creatinine 1.14 (H) 0.52 - 1.04 mg/dL    Calcium 9.6 8.5 - 10.1 mg/dL    Glucose 201 (H) 70 - 99 mg/dL    Alkaline Phosphatase 88 40 - 150 U/L    AST 16 0 - 45 U/L    ALT 28 0 - 50 U/L    Protein Total 6.9 6.8 - 8.8 g/dL    Albumin 3.4 3.4 - 5.0 g/dL    Bilirubin Total 0.4 0.2 - 1.3 mg/dL    GFR Estimate 49 (L) >60 mL/min/1.73m2   CK total   Result Value Ref Range     30 - 225 U/L   Extra Blue Top Tube   Result Value Ref Range    Hold Specimen JIC    Extra Red Top Tube   Result Value Ref Range    Hold Specimen JIC    Extra Green Top (Lithium Heparin) Tube   Result Value Ref Range    Hold Specimen JIC    Extra Purple Top Tube   Result Value Ref Range    Hold Specimen JIC    CBC with platelets and differential   Result Value Ref Range    WBC Count 8.6 4.0 - 11.0 10e3/uL    RBC Count 4.28 3.80 - 5.20 10e6/uL    Hemoglobin 9.9 (L) 11.7 - 15.7 g/dL    Hematocrit 36.3 35.0 - 47.0 %    MCV 85 78 - 100 fL    MCH 23.1 (L) 26.5 - 33.0 pg    MCHC 27.3 (L) 31.5 - 36.5 g/dL    RDW 18.8 (H) 10.0 - 15.0 %    Platelet Count 334 150 - 450 10e3/uL    % Neutrophils 59 %    % Lymphocytes 26 %    % Monocytes 8 %    %  Eosinophils 6 %    % Basophils 1 %    % Immature Granulocytes 0 %    NRBCs per 100 WBC 0 <1 /100    Absolute Neutrophils 5.1 1.6 - 8.3 10e3/uL    Absolute Lymphocytes 2.2 0.8 - 5.3 10e3/uL    Absolute Monocytes 0.7 0.0 - 1.3 10e3/uL    Absolute Eosinophils 0.5 0.0 - 0.7 10e3/uL    Absolute Basophils 0.1 0.0 - 0.2 10e3/uL    Absolute Immature Granulocytes 0.0 <=0.4 10e3/uL    Absolute NRBCs 0.0 10e3/uL   XR Pelvis 1/2 Views    Narrative    1 views pelvis radiograph(s) 4/22/2022 10:23 AM    History: fall, pain    Additional History from EMR: Patient fell today in her bathroom and  landed on her buttocks. Currently having pain in the tailbone area.    Comparison: CT abdomen and pelvis 10/1/2013.    Findings:    1 view(s) of the pelvis were obtained.     No acute osseous abnormality.    Degenerative changes of the hips bilaterally with marked joint space  narrowing of the left hip. Degenerative changes of the lower lumbar  spine.    Small radiodensities project over the iliac bones bilaterally,  presumably soft tissue calcifications or granulomas.    Within the pelvic inlet, there is a small radiodensity within the  center of the soft tissue, presumably a uterine leiomyoma.    Sacrum and innominate bones are partially obscured by overlying bowel  gas/fecal content.    Pelvic phlebolith.      Impression    Impression:  1. No acute osseous abnormality.  2. Degenerative changes of the hips and lower lumbar spine.    I have personally reviewed the examination and initial interpretation  and I agree with the findings.    LYNN MACIEL MD         SYSTEM ID:  [id number]   XR Sacrum and Coccyx 2 Views    Narrative    3 views pelvis radiograph(s) 4/22/2022 10:33 AM    History: fall, pain    Additional History from EMR: Fall in bathroom this morning with pain  over the tailbone.    Comparison: CT abdomen and pelvis 10/1/2013    Findings:    AP and lateral view(s) of the sacrum/sacroiliac joints were obtained.     There are  overlying densities at the sacrococcygeal junction that  appears different from CT 2013, without definite evidence of fracture.  If there is high clinical suspicion for sacral/coccygeal fracture,  recommend CT with sagittal reconstructions to better delineate the  sacral and coccygeal bones.    Degenerative changes of the hips bilaterally, with marked joint space  narrowing of the left hip. Small radiodensities in the center of the  pelvic inlet, likely representing a uterine leiomyoma.    No erosion or ankylosis of either sacroiliac joints.      Sacrum is partially obscured by overlying bowel gas/fecal content.    Pelvic phleboliths.      Impression    Impression:  No definite evidence of fracture. See above if additional imaging is  warranted.    I have personally reviewed the examination and initial interpretation  and I agree with the findings.    LYNN MACIEL MD         SYSTEM ID:  [id number]   UA with Microscopic reflex to Culture    Specimen: Urine, Clean Catch   Result Value Ref Range    Color Urine Light Yellow Colorless, Straw, Light Yellow, Yellow    Appearance Urine Clear Clear    Glucose Urine >=1000 (A) Negative mg/dL    Bilirubin Urine Negative Negative    Ketones Urine Negative Negative mg/dL    Specific Gravity Urine 1.023 1.003 - 1.035    Blood Urine Negative Negative    pH Urine 5.0 5.0 - 7.0    Protein Albumin Urine Negative Negative mg/dL    Urobilinogen Urine Normal Normal, 2.0 mg/dL    Nitrite Urine Negative Negative    Leukocyte Esterase Urine Negative Negative    Bacteria Urine Few (A) None Seen /HPF    RBC Urine <1 <=2 /HPF    WBC Urine 4 <=5 /HPF    Squamous Epithelials Urine 1 <=1 /HPF    Transitional Epithelials Urine <1 <=1 /HPF    Narrative    Urine Culture not indicated   Asymptomatic COVID-19 Virus (Coronavirus) by PCR Nasopharyngeal    Specimen: Nasopharyngeal; Swab   Result Value Ref Range    SARS CoV2 PCR Negative Negative, Testing sent to reference lab. Results will be returned  via unsolicited result    Narrative    Testing was performed using the Xpert Xpress SARS-CoV-2 Assay on the  Cepheid Gene-Xpert Instrument Systems. Additional information about  this Emergency Use Authorization (EUA) assay can be found via the Lab  Guide. This test should be ordered for the detection of SARS-CoV-2 in  individuals who meet SARS-CoV-2 clinical and/or epidemiological  criteria. Test performance is unknown in asymptomatic patients. This  test is for in vitro diagnostic use under the FDA EUA for  laboratories certified under CLIA to perform high complexity testing.  This test has not been FDA cleared or approved. A negative result  does not rule out the presence of PCR inhibitors in the specimen or  target RNA in concentration below the limit of detection for the  assay. The possibility of a false negative should be considered if  the patient's recent exposure or clinical presentation suggests  COVID-19. This test was validated by the [facility]. This laboratory is certified under  the Clinical Laboratory Improvement Amendments of 1988 (CLIA-88) as  qualified to perform high complexity laboratory testing.     Abd/pelvis CT no contrast - [place] Protocol    Narrative    EXAMINATION: CT ABDOMEN PELVIS W/O CONTRAST, 4/22/2022 1:01 PM    TECHNIQUE:  Helical CT images from the lung bases through the  symphysis pubis were obtained without IV contrast.     COMPARISON: 10/1/2013    HISTORY: Trauma - Abdominal/Pelvic Injury; Please evaluate for  sacral/coccygeal fractures    FINDINGS:  Unenhanced liver, gallbladder, bile ducts, pancreas, spleen, adrenal  glands are unremarkable. Multiple renal cysts bilaterally. Distended  mildly trabeculated urinary bladder. Calcified fibroid at the uterine  fundus. Sigmoid prominent colonic diverticulosis without acute  inflammation. Appendix is normal. No dilated bowel. No free fluid or  free air. No adenopathy. Aortoiliac  atherosclerotic calcifications  without aneurysm.    Lower thorax:  Atelectasis in the lung bases.    Bones and soft tissues:  Small fat-containing periumbilical hernia. Multifocal areas of fat  necrosis in the simultaneous fat overlying the pelvis and lower  abdomen posteriorly. No acute or suspicious osseous lesion. No acute  fracture of the sacrum or coccyx. Diffusely demineralized appearing  bones.      Impression    IMPRESSION:  No acute finding.    I have personally reviewed the examination and initial interpretation  and I agree with the findings.    SO BUSTILLOS MD         SYSTEM ID:  [id number]     Medications   diclofenac (VOLTAREN) 1 % topical gel 2 g (has no administration in time range)   acetaminophen (TYLENOL) tablet 650 mg (has no administration in time range)     Or   acetaminophen (TYLENOL) Suppository 650 mg (has no administration in time range)   Lidocaine (LIDOCARE) 4 % Patch 1 patch (has no administration in time range)   lidocaine patch in PLACE ( Transdermal Patch in Place 4/22/22 1604)   menthol (ICY HOT) 5 % patch 1 patch (has no administration in time range)     And   menthol (ICY HOT) Patch in Place ( Transdermal Patch in Place 4/22/22 1604)   potassium bicarbonate (K-LYTE) solu-tab 50 mEq (50 mEq Oral Given 4/22/22 1142)   oxyCODONE-acetaminophen (PERCOCET) 5-325 MG per tablet 1 tablet (1 tablet Oral Given 4/22/22 1206)             Assessments & Plan (with Medical Decision Making)   Tonya Yang 81-year-old woman who is presenting to the emergency room after a mechanical fall that occurred earlier this morning.  Differential diagnosis: Pelvis fracture, coccygeal fracture, contusion, unlikely ICH, unlikely C-spine fracture, electro abnormalities, rhabdomyolysis, UTI.    After thorough history and physical exam patient was to be no acute distress.  I will obtain laboratory studies and x-rays for further diagnostic evaluation.  She will need social work services consult.      Laboratory  studies returned with no leukocytosis, WBC is normal at 8600.  There is anemia with hemoglobin of 9.9 which is patient's baseline.  Electrolytes show slightly elevated creatinine of 1.14.  There is hypokalemia with potassium of 3.3.  Patient was given a oral potassium bicarbonate solution for hypokalemia repletion.  CK is within normal limits and 111.  Troponin is within the normal limits at 7.  Urinalysis shows no evidence of infection.     Reviewed patient's pelvis x-rays as well as x-rays of the sacrum/coccyx; no evidence of pelvic fracture or sacral/coccygeal fracture, respectively.  Recommendations were made by radiology to pursue further imaging due to inadequate views.  I did order CT abdomen/pelvis for further evaluation of possible pelvic/sacral/coccygeal fractures.  I reviewed the study and I read the radiology report; no acute fractures were found.  Patient was given oral Percocet for pain management.  She will be admitted to the observation unit for continuous pain management, PT/OT evaluation, social work consultation, and potential placement.  She and her son agree with the plan.    I have reviewed the nursing notes.    I have reviewed the findings, diagnosis, plan and need for follow up with the patient.    New Prescriptions    No medications on file       Final diagnoses:   Fall, initial encounter   Contusion of coccyx, initial encounter   Hypokalemia       Leah SANDOVAL am serving as a trained medical scribe to document services personally performed by Mikki Moreno MD, based on the provider's statements to me.      Josh SANDOVAL Nikola MD, was physically present and have reviewed and verified the accuracy of this note documented by Leah Espinoza.     Mikki Moreno MD  4/22/2022   [facility] EMERGENCY DEPARTMENT     Mikki Moreno MD  04/22/22 1376

## 2022-04-22 NOTE — LETTER
[facility] [address] OBSERVATION [address] [address]  [phone]    FACSIMILE TRANSMITTAL SHEET       From:  OTILIA Craft, [facility] ED/Observation  [facility]  Phone: [phone]  Pager: [phone]    Confidentiality Notice: The document(s) accompanying this fax may contain protected health information under state and federal law and is legally privileged. The information is only for the use of the intended recipient named above. The recipient or person responsible for delivering this information is prohibited by law from disclosing this information without proper authorization to any other party, unless required to do so by law or regulation. If you are not the intended recipient, you are hereby notified that any review, dissemination, distribution, or copying of this message is strictly prohibited. If you have received this communication in error, please notify us immediately by telephone to arrange for the return or destruction of the faxed document. Thank you

## 2022-04-22 NOTE — ED TRIAGE NOTES
"Triage Assessment & Note:    BP (!) 145/68   Pulse 87   Temp 97.6  F (36.4  C) (Oral)   Resp 20   Ht 1.651 m (5' 5\")   Wt 89.8 kg (198 lb)   LMP  (LMP Unknown)   SpO2 100%   BMI 32.95 kg/m        Patient presents with: Pt with hx of spinal stenosis BIBA ([facility]) from home with reports of fall in the bathroom. Pt denies hitting head or neck/ back pain. Pt reports multiple falls and recent hospitalization. Per EMS, pt, and family self care is been difficult since recent hospitalization and in need of a wc. No reports of fever, cough, CP, or travel.     Home Treatments/Remedies: Home medications    Febrile / Afebrile: afebrile    Duration of C/o: < 2 hrs    Iraida Carney RN  April 22, 2022        "

## 2022-04-22 NOTE — H&P
[facility]    History and Physical - Hospitalist Service       Date of Admission:  4/22/2022    Assessment & Plan   Tonya Yang is a 81 year old old female Past medical history significant for hypertension, diabetes mellitus type 2, neuropathy, polyarthralgia, chronic low back pain, severe spinal stenosis recently hospitalized at [facility] 3/7-3/13 for pneumonia, severe sepsis, acute on chronic low back pain,admitted to [facility] from 04/07/2022- 04/15/2022 for mechanical fall now presents with ED with complaints of a fall after leaving AMA from TCU on 4/15.      Assessment and Plan:    ## Fall  ## Coccyx pain  ## Spinal stenosis   ## Chronic lower extremity edema  ## Chronic low back pain  ## Disposition  Patient presents to the ED after a fall at home.Patient had been admitted after a fall to [facility] and was discharged to a TCU. The patient reports she spent 6 hours there and per patient no one had been in to see her for the 6 hours. She called her son and left AMA at midnight. She reports since being home, she has had difficulty ambulating. Reports pain and edema in both feet. She notes neuropathy in both her feet. She has to shuffle to get around her apartment. She reports she went to the bathroom and noted she was out of toilet paper and towels. She went to the linen closet to grab a towel and lost her balance landing on her buttocks. There was no loss of consciousness or prodromal symptoms or head trauma.  She reports it took her 1 1/2 hours to crawl to her cell phone in her living room and call her son. She complained of pain near her tailbone and subsequently was brought into the ED.  Imaging was negative for any fractures.   She has chronic low back pain. Discussed her case with pain team as she has a pain contract and concerns of polypharmacy. Discussed with patient her goals of care. She would like to return home when the equipment her and  her son had ordered is at her apartment, specifically a wheelchair. She does not wish to return to a TCU. The patient reports her son is able to set her up with things and spend about 2 hours a day with her. She notes her ex-daughter-in-law called today and will be able to help her daily. In the ED: Vitals:BP: 115/85 Pulse: 83 Temp: 97.6 Resp: 18 SP02:96 % Labs: Na 137, K 3.3, Cr 1.14, GFR 49, LFTS normal, , Troponin 7, , WBC 8.6, Hgb 9.9, plt 334, UA with >1000 glucose, negative for infection.  Medications: Percocet 1 tablet x1, Potassium Bicarb 50 mEq po x 1. Imaging: pelvic xray shows: No acute osseous abnormality. Degenerative changes of the hips and lower lumbar spine. Xray sacrum and coccyx: No definite evidence of fracture. See above if additional imaging is warranted. Ct abdomen/pelvis shows: Small fat-containing periumbilical hernia. Multifocal areas of fat  necrosis in the simultaneous fat overlying the pelvis and lower abdomen posteriorly. No acute or suspicious osseous lesion. No acute fracture of the sacrum or coccyx. Diffusely demineralized appearing bones.Consults: PT/OT/SW Plan: Admit to ED observation for PT and disposition.   - Continue PTA Tylenol, Oxycodone, Robaxin, Voltaren 1% gel, Lyrica  - Add Lidoderm patches alternating with Menthol patches  - Ambulate TID    - PT OT eval     ## Hypokalemia : K 3.3 Received 50 mEq once  - Daily Potassium 20Meq.     ## PAULINE Cr 1.13  - Hold Lasix     ## Constipation  - Miralax daily  - Senna S BID     ## Hypertension  - PTA Coreg 3.125 mg BID, Lisinopril 5 mg daily    ## Diabetes mellitus type 2  - PTA Metformin, Glipizide, Jardiance and MSSI  - BG TID and HS  - Hypoglycemic protocol  - High Consistent diet     ## Overactive bladder:  - PTA Myrbetriq    ## Anemia  - PTA Ferrous sulfate     ## Bipolar   ## Depression  ## Anxiety  PTA Lamictal and Cymbalta    ## GERD  - PTA Protonix    Diet: High Consistent Carb (75 g CHO per Meal) Diet  Code Status:  "Full Code          Disposition Plan   Expected Discharge: 2-3 days   Anticipated discharge location: Home  Delays: Disposition       The patient's care was discussed with the Bedside Nurse, Patient and ED physician, Dr. Moreno.    JUAN ANTONIO Neville CNP  ______________________________________________________________________    Chief Complaint   Fall     History is obtained from the patient and chart review.     History of Present Illness   Per ED, \" Tonya Yang is a 81 year old female with a past medical history significant for hypertension, diabetes mellitus type 2, neuropathy, polyarthralgia, chronic low back pain, severe spinal stenosis recently hospitalized at [facility] 3/7-3/13 for pneumonia, severe sepsis, and acute on chronic low back pain received steroid injection who presents to the Emergency Department for evaluation of injury sustained from fall.     Patient reports that today she was in her bathroom and was reaching for a towel when she had a fall resulting in her landing on her buttocks.  She states that since the fall she has constant, mild to moderate, nonradiating, sharp pain surrounding the tailbone area of her buttocks.  Movement makes the pain worse, rest improves it.  She adds that she is also experiencing increased left hand pain since the fall today.  She says that she had some mild left hand pain prior to the fall but since the fall this has worsened in intensity.  She adds that when she sits on a pillow it does help reduce some of the pain in her tailbone area.  She notes that she had to scoot in order to get to a phone to call her son who brought her to the ED today.  Patient denies head injury.  Patient denies use of anticoagulants.  Patient denies use of oxygen at home.  Patient reports that she does have history of pneumonia.     Per patient's son, patient was admitted to [facility] from 04/07/2022- 04/15/2022 for mechanical fall.  He states that she was found to have spinal " "stenosis that has been worsening.  He states that when the patient was released from the hospital her walking had improved.  He says that the patient was supposed to have her medications after discharge but these medications were not ready and the patient has been having issues with receiving her proper prescriptions.  He notes that he does not stay with the patient and lives about 3 miles away.     Per chart review, patient was admitted to [facility] from 04/07/2022-04/15/2022 for evaluation of fall. Patient has history of hypertension, diabetes mellitus type 2, neuropathy, polyarthralgia, chronic low back pain, severe spinal stenosis recently hospitalized at [facility] 3/7-3/13 for pneumonia, severe sepsis, acute on chronic low back pain received steroid injection discharged to TCU presented to the ED with complaints of a fall.  She had a fall 2 days prior to admission when she lost her balance and fell onto her left elbow.  There was no loss of consciousness or prodromal symptoms or head trauma.  She complained of generalized pain and subsequently was brought into the ED.  Imaging was negative for any fractures.  She complained of worsening lower extremity edema and was treated with IV Lasix, home Lasix dose was increased.  Amlodipine discontinued. Multiple medications were discontinued.  Oxycodone dose was increased.  Started on topical diclofenac gel.  She has chronic anemia.  Iron studies were consistent with iron deficiency anemia.  Started on oral iron.  She was discharged to TCU for further rehabilitation on 04/15. \"    Review of Systems    The 10 point Review of Systems is negative other than noted in the HPI or here. Coccyx pain, lower back pain.     Past Medical History    I have reviewed this patient's medical history and updated it with pertinent information if needed.   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  Past Surgical History:   Procedure Laterality Date     CATARACT IOL, RT/LT Left 2013     FOOT SURGERY      toe     GI SURGERY       IR LUMBAR/SACRAL TRANSFOR INJ BILATERAL Bilateral 3/11/2022     LAPAROSCOPIC HEPATECTOMY PARTIAL  2013    Procedure: LAPAROSCOPIC HEPATECTOMY PARTIAL;  Laparoscopic Debridement of Liver Abcess;  Surgeon: Sepideh Green MD;  Location: [facility] OR     ORTHOPEDIC SURGERY       PICC INSERTION  2013    5fr DL Power PICC, 41cm (1cm external length) in the R lateral brachial vein with tip in the SVC RA junction.     RECTAL SURGERY      1970s     VASCULAR SURGERY         Social History   I have reviewed this patient's social history and updated it with pertinent information if needed.  Social History     Tobacco Use     Smoking status: Former Smoker     Types: Cigarettes     Quit date: 11/15/1987     Years since quittin.4     Smokeless tobacco: Former User   Substance Use Topics     Alcohol use: Not Currently     Alcohol/week: 0.0 standard drinks     Comment: MALISSA white since      Drug use: No     Comment: used marijuanna in the past       Family History   I have reviewed this patient's family history and updated it with  pertinent information if needed.  Family History   Problem Relation Age of Onset     Cerebrovascular Disease Mother      Heart Disease Mother      Heart Disease Father      Heart Disease Brother      Diabetes No family hx of      Coronary Artery Disease No family hx of      Hypertension No family hx of      Hyperlipidemia No family hx of      Breast Cancer No family hx of      Colon Cancer No family hx of      Prostate Cancer No family hx of      Other Cancer No family hx of      Depression No family hx of      Anxiety Disorder No family hx of      Mental Illness No family hx of      Substance Abuse No family hx of      Anesthesia Reaction No family hx of      Asthma No family hx of      Osteoporosis No family hx of      Genetic Disorder No family hx of      Thyroid Disease No family hx of      Obesity No family hx of      Unknown/Adopted No family hx of        Prior to Admission Medications   Prior to Admission Medications   Prescriptions Last Dose Informant Patient Reported? Taking?   CONTOUR NEXT TEST test strip   Yes No   Sig: TESTING EVERY DAY DX  E11.9   DULoxetine (CYMBALTA) 60 MG EC capsule   No No   Sig: TAKE 1 CAPSULE (60 MG) BY MOUTH DAILY   JARDIANCE 25 MG TABS tablet   Yes No   Sig: Take 25 mg by mouth daily    acetaminophen (TYLENOL) 325 MG tablet   No No   Sig: Take 1-2 tablets (325-650 mg) by mouth every 6 hours as needed for mild pain   atorvastatin (LIPITOR) 10 MG tablet   Yes No   Sig: Take 10 mg by mouth At Bedtime   blood glucose (ACCU-CHEK FASTCLIX) lancing device   Yes No   Sig: FOR TESTING ONCE DAILY. DX  E11.9 TYPE 2 DIABETES   blood glucose (CONTOUR TEST) test strip   Yes No   Sig: TESTING EVERY DAY DX  E11.9   carvedilol (COREG) 3.125 MG tablet   No No   Sig: Take 1 tablet (3.125 mg) by mouth 2 times daily (with meals)   cyanocobalamin 1000 MCG SUBL   Yes No   Sig: Place 1,000 mcg under the tongue daily   diclofenac (VOLTAREN) 1 % topical gel   No No   Sig: Apply 4 g topically 4 times  daily   ferrous sulfate (FEROSUL) 325 (65 Fe) MG tablet   Yes No   Sig: Take 1 tablet (325 mg) by mouth daily   furosemide (LASIX) 20 MG tablet   No No   Sig: Take 1 tablet (20 mg) by mouth every evening 2 pm   furosemide (LASIX) 40 MG tablet   Yes No   Sig: Take 1 tablet (40 mg) by mouth in the morning.   glipiZIDE (GLUCOTROL) 10 MG tablet   No No   Sig: Take 1 tablet (10 mg) by mouth 2 times daily (before meals)   insulin aspart (NOVOLOG PEN) 100 UNIT/ML pen   Yes No   Sig: Correction Scale - MEDIUM INSULIN RESISTANCE DOSING     Do Not give Correction Insulin if Pre-Meal BG less than 140.   For Pre-Meal  - 189 give 1 unit.   For Pre-Meal  - 239 give 2 units.   For Pre-Meal  - 289 give 3 units.   For Pre-Meal  - 339 give 4 units.   For Pre-Meal - 399 give 5 units.   For Pre-Meal -449 give 6 units  For Pre-Meal BG greater than or equal to 450 give 7 units.   To be given with prandial insulin, and based on pre-meal blood glucose.    Notify provider if glucose greater than or equal to 350 mg/dL after administration of correction dose.   lamoTRIgine (LAMICTAL) 100 MG tablet 4/22/2022 at Unknown time  Yes Yes   Sig: Take 100 mg by mouth 2 times daily   lisinopril (ZESTRIL) 5 MG tablet   Yes No   Sig: Take 5 mg by mouth daily   melatonin 1 MG TABS tablet   Yes No   Sig: Take 1 tablet (1 mg) by mouth nightly as needed for sleep   metFORMIN (GLUCOPHAGE) 500 MG tablet   Yes No   Sig: Take 500 mg by mouth 2 times daily (with meals)   methocarbamol (ROBAXIN) 500 MG tablet   No No   Sig: Take 1 tablet (500 mg) by mouth every 12 hours as needed for muscle spasms   miconazole (MICATIN) 2 % external powder   Yes No   Sig: Apply topically 2 times daily To the skin fold   mineral oil-hydrophilic petrolatum (AQUAPHOR) external ointment   Yes No   Sig: Apply topically 3 times daily To the dry skin   mirabegron (MYRBETRIQ) 25 MG 24 hr tablet   Yes No   Sig: Take 25 mg by mouth At Bedtime     omeprazole (PRILOSEC) 20 MG DR capsule   Yes No   Sig: Take 20 mg by mouth daily    order for DME   No No   Sig: Equipment being ordered: wrist brace   oxyCODONE 7.5 MG TABS 4/22/2022 at Unknown time  No Yes   Sig: Take 7.5 mg by mouth every 6 hours as needed for moderate to severe pain   polyethylene glycol (MIRALAX) 17 GM/Dose powder   No No   Sig: Take 17 g by mouth daily   predniSONE (DELTASONE) 1 MG tablet   Yes No   Sig: Take 6 mg by mouth daily    pregabalin (LYRICA) 25 MG capsule   No No   Sig: Take 1 capsule (25 mg) by mouth At Bedtime   senna (SENOKOT) 8.6 MG tablet   Yes No   Sig: Take 2 tablets by mouth 2 times daily as needed for constipation   senna-docusate (SENOKOT-S/PERICOLACE) 8.6-50 MG tablet   No No   Sig: Take 1 tablet by mouth 2 times daily   triamcinolone (KENALOG) 0.1 % external cream   Yes No   Sig: Apply topically 2 times daily as needed    vitamin D3 (CHOLECALCIFEROL) 50 mcg (2000 units) tablet   Yes No   Sig: Take 3 tablets by mouth daily      Facility-Administered Medications: None     Allergies   Allergies   Allergen Reactions     Propofol Nausea and Vomiting     Shellfish Allergy      Other reaction(s): Dizziness     Capsaicin Rash and Blisters       Physical Exam   Vital Signs: Temp: 97.6  F (36.4  C) Temp src: Oral BP: (!) 144/59 Pulse: 79   Resp: 18 SpO2: 92 % O2 Device: None (Room air) Oxygen Delivery: 1.5 LPM  Weight: 198 lbs 0 oz    Constitutional: healthy, alert and no distress   Head: Normocephalic. No masses, lesions, tenderness or abnormalities   Neck: Neck supple. No adenopathy. Thyroid symmetric, normal size,, Carotids without bruits.   ENT: ENT exam normal, no neck nodes or sinus tenderness   Cardiovascular: RRR. No murmurs, clicks gallops or rub Edema +1 Bilaterally.   Respiratory: . Good diaphragmatic excursion. Lungs clear   Gastrointestinal: Abdomen soft,. BS normal. No masses, organomegaly.   : Deferred   Musculoskeletal: Tenderness along entire spine, coccyx  spine.   Skin: no suspicious lesions or rashes   Neurologic: Gait normal. Reflexes normal and symmetric. Sensation grossly WNL.   Psychiatric: mentation appears normal and affect normal/bright   Hematologic/Lymphatic/Immunologic: normal ant/post cervical, axillary, supraclavicular and inguinal     Data   Data reviewed today: I reviewed all medications, new labs and imaging results over the last 24 hours.    Recent Labs   Lab 04/22/22  0935   WBC 8.6   HGB 9.9*   MCV 85         POTASSIUM 3.3*   CHLORIDE 100   CO2 29   BUN 30   CR 1.14*   ANIONGAP 8   MACY 9.6   *   ALBUMIN 3.4   PROTTOTAL 6.9   BILITOTAL 0.4   ALKPHOS 88   ALT 28   AST 16     Most Recent 3 CBC's:Recent Labs   Lab Test 04/22/22  0935 04/13/22  0559 04/07/22  1311 03/11/22  0510   WBC 8.6  --  10.3 8.9   HGB 9.9*  --  9.7* 8.7*   MCV 85  --  84 84    236 283 269     Most Recent 3 BMP's:Recent Labs   Lab Test 04/22/22  0935 04/15/22  1150 04/15/22  0736 04/14/22  0758 04/14/22  0443 04/13/22  0758 04/13/22  0559     --   --   --  143  --  144   POTASSIUM 3.3*  --   --   --  3.5  --  3.6   CHLORIDE 100  --   --   --  99  --  99   CO2 29  --   --   --  28  --  32*   BUN 30  --   --   --  38*  --  37*   CR 1.14*  --   --   --  1.25*  --  1.27*   ANIONGAP 8  --   --   --  16  --  13   MACY 9.6  --   --   --  9.7  --  9.6   * 170* 112*   < > 113   < > 133*    < > = values in this interval not displayed.     Most Recent 2 LFT's:Recent Labs   Lab Test 04/22/22  0935 04/07/22  1311   AST 16 19   ALT 28 19   ALKPHOS 88 85   BILITOTAL 0.4 0.4     Recent Results (from the past 24 hour(s))   XR Pelvis 1/2 Views    Narrative    1 views pelvis radiograph(s) 4/22/2022 10:23 AM    History: fall, pain    Additional History from EMR: Patient fell today in her bathroom and  landed on her buttocks. Currently having pain in the tailbone area.    Comparison: CT abdomen and pelvis 10/1/2013.    Findings:    1 view(s) of the pelvis were  obtained.     No acute osseous abnormality.    Degenerative changes of the hips bilaterally with marked joint space  narrowing of the left hip. Degenerative changes of the lower lumbar  spine.    Small radiodensities project over the iliac bones bilaterally,  presumably soft tissue calcifications or granulomas.    Within the pelvic inlet, there is a small radiodensity within the  center of the soft tissue, presumably a uterine leiomyoma.    Sacrum and innominate bones are partially obscured by overlying bowel  gas/fecal content.    Pelvic phlebolith.      Impression    Impression:  1. No acute osseous abnormality.  2. Degenerative changes of the hips and lower lumbar spine.    I have personally reviewed the examination and initial interpretation  and I agree with the findings.    LYNN MACIEL MD         SYSTEM ID:  [id number]   XR Sacrum and Coccyx 2 Views    Narrative    3 views pelvis radiograph(s) 4/22/2022 10:33 AM    History: fall, pain    Additional History from EMR: Fall in bathroom this morning with pain  over the tailbone.    Comparison: CT abdomen and pelvis 10/1/2013    Findings:    AP and lateral view(s) of the sacrum/sacroiliac joints were obtained.     There are overlying densities at the sacrococcygeal junction that  appears different from CT 2013, without definite evidence of fracture.  If there is high clinical suspicion for sacral/coccygeal fracture,  recommend CT with sagittal reconstructions to better delineate the  sacral and coccygeal bones.    Degenerative changes of the hips bilaterally, with marked joint space  narrowing of the left hip. Small radiodensities in the center of the  pelvic inlet, likely representing a uterine leiomyoma.    No erosion or ankylosis of either sacroiliac joints.      Sacrum is partially obscured by overlying bowel gas/fecal content.    Pelvic phleboliths.      Impression    Impression:  No definite evidence of fracture. See above if additional imaging  is  warranted.    I have personally reviewed the examination and initial interpretation  and I agree with the findings.    LYNN MACIEL MD         SYSTEM ID:  [id number]   Abd/pelvis CT no contrast - Stone Protocol    Narrative    EXAMINATION: CT ABDOMEN PELVIS W/O CONTRAST, 4/22/2022 1:01 PM    TECHNIQUE:  Helical CT images from the lung bases through the  symphysis pubis were obtained without IV contrast.     COMPARISON: 10/1/2013    HISTORY: Trauma - Abdominal/Pelvic Injury; Please evaluate for  sacral/coccygeal fractures    FINDINGS:  Unenhanced liver, gallbladder, bile ducts, pancreas, spleen, adrenal  glands are unremarkable. Multiple renal cysts bilaterally. Distended  mildly trabeculated urinary bladder. Calcified fibroid at the uterine  fundus. Sigmoid prominent colonic diverticulosis without acute  inflammation. Appendix is normal. No dilated bowel. No free fluid or  free air. No adenopathy. Aortoiliac atherosclerotic calcifications  without aneurysm.    Lower thorax:  Atelectasis in the lung bases.    Bones and soft tissues:  Small fat-containing periumbilical hernia. Multifocal areas of fat  necrosis in the simultaneous fat overlying the pelvis and lower  abdomen posteriorly. No acute or suspicious osseous lesion. No acute  fracture of the sacrum or coccyx. Diffusely demineralized appearing  bones.      Impression    IMPRESSION:  No acute finding.    I have personally reviewed the examination and initial interpretation  and I agree with the findings.    SO BUSTILLOS MD         SYSTEM ID:  [id number]

## 2022-04-23 ENCOUNTER — APPOINTMENT (OUTPATIENT)
Dept: PHYSICAL THERAPY | Facility: CLINIC | Age: 82
End: 2022-04-23
Attending: NURSE PRACTITIONER
Payer: MEDICARE

## 2022-04-23 LAB
ANION GAP SERPL CALCULATED.3IONS-SCNC: 5 MMOL/L (ref 3–14)
BUN SERPL-MCNC: 20 MG/DL (ref 7–30)
CALCIUM SERPL-MCNC: 10.1 MG/DL (ref 8.5–10.1)
CHLORIDE BLD-SCNC: 103 MMOL/L (ref 94–109)
CO2 SERPL-SCNC: 32 MMOL/L (ref 20–32)
CREAT SERPL-MCNC: 0.86 MG/DL (ref 0.52–1.04)
ERYTHROCYTE [DISTWIDTH] IN BLOOD BY AUTOMATED COUNT: 18.7 % (ref 10–15)
GFR SERPL CREATININE-BSD FRML MDRD: 68 ML/MIN/1.73M2
GLUCOSE BLD-MCNC: 124 MG/DL (ref 70–99)
GLUCOSE BLDC GLUCOMTR-MCNC: 103 MG/DL (ref 70–99)
GLUCOSE BLDC GLUCOMTR-MCNC: 141 MG/DL (ref 70–99)
GLUCOSE BLDC GLUCOMTR-MCNC: 161 MG/DL (ref 70–99)
GLUCOSE BLDC GLUCOMTR-MCNC: 221 MG/DL (ref 70–99)
GLUCOSE BLDC GLUCOMTR-MCNC: 234 MG/DL (ref 70–99)
HCT VFR BLD AUTO: 37 % (ref 35–47)
HGB BLD-MCNC: 10 G/DL (ref 11.7–15.7)
MAGNESIUM SERPL-MCNC: 2 MG/DL (ref 1.6–2.3)
MCH RBC QN AUTO: 22.9 PG (ref 26.5–33)
MCHC RBC AUTO-ENTMCNC: 27 G/DL (ref 31.5–36.5)
MCV RBC AUTO: 85 FL (ref 78–100)
PHOSPHATE SERPL-MCNC: 2.9 MG/DL (ref 2.5–4.5)
PLATELET # BLD AUTO: 348 10E3/UL (ref 150–450)
POTASSIUM BLD-SCNC: 3.2 MMOL/L (ref 3.4–5.3)
POTASSIUM BLD-SCNC: 4.1 MMOL/L (ref 3.4–5.3)
RBC # BLD AUTO: 4.37 10E6/UL (ref 3.8–5.2)
SODIUM SERPL-SCNC: 140 MMOL/L (ref 133–144)
WBC # BLD AUTO: 7.5 10E3/UL (ref 4–11)

## 2022-04-23 PROCEDURE — 96372 THER/PROPH/DIAG INJ SC/IM: CPT

## 2022-04-23 PROCEDURE — 80048 BASIC METABOLIC PNL TOTAL CA: CPT | Performed by: NURSE PRACTITIONER

## 2022-04-23 PROCEDURE — 97530 THERAPEUTIC ACTIVITIES: CPT | Mod: GP

## 2022-04-23 PROCEDURE — 82962 GLUCOSE BLOOD TEST: CPT

## 2022-04-23 PROCEDURE — 250N000013 HC RX MED GY IP 250 OP 250 PS 637: Performed by: PHYSICIAN ASSISTANT

## 2022-04-23 PROCEDURE — 250N000013 HC RX MED GY IP 250 OP 250 PS 637: Performed by: NURSE PRACTITIONER

## 2022-04-23 PROCEDURE — 250N000011 HC RX IP 250 OP 636: Performed by: NURSE PRACTITIONER

## 2022-04-23 PROCEDURE — 97116 GAIT TRAINING THERAPY: CPT | Mod: GP

## 2022-04-23 PROCEDURE — 84132 ASSAY OF SERUM POTASSIUM: CPT | Mod: 91 | Performed by: PHYSICIAN ASSISTANT

## 2022-04-23 PROCEDURE — 83735 ASSAY OF MAGNESIUM: CPT | Performed by: NURSE PRACTITIONER

## 2022-04-23 PROCEDURE — 99225 PR SUBSEQUENT OBSERVATION CARE,LEVEL II: CPT | Performed by: INTERNAL MEDICINE

## 2022-04-23 PROCEDURE — 36415 COLL VENOUS BLD VENIPUNCTURE: CPT | Performed by: NURSE PRACTITIONER

## 2022-04-23 PROCEDURE — 250N000012 HC RX MED GY IP 250 OP 636 PS 637: Performed by: NURSE PRACTITIONER

## 2022-04-23 PROCEDURE — 97161 PT EVAL LOW COMPLEX 20 MIN: CPT | Mod: GP

## 2022-04-23 PROCEDURE — 84100 ASSAY OF PHOSPHORUS: CPT | Performed by: NURSE PRACTITIONER

## 2022-04-23 PROCEDURE — 36415 COLL VENOUS BLD VENIPUNCTURE: CPT | Performed by: PHYSICIAN ASSISTANT

## 2022-04-23 PROCEDURE — G0378 HOSPITAL OBSERVATION PER HR: HCPCS

## 2022-04-23 PROCEDURE — 85014 HEMATOCRIT: CPT | Performed by: NURSE PRACTITIONER

## 2022-04-23 RX ORDER — NYSTATIN 100000 U/G
CREAM TOPICAL 2 TIMES DAILY
Status: DISCONTINUED | OUTPATIENT
Start: 2022-04-23 | End: 2022-04-25 | Stop reason: HOSPADM

## 2022-04-23 RX ORDER — DIPHENHYDRAMINE HCL 25 MG
25 CAPSULE ORAL ONCE
Status: COMPLETED | OUTPATIENT
Start: 2022-04-23 | End: 2022-04-23

## 2022-04-23 RX ORDER — HYDROXYZINE HYDROCHLORIDE 25 MG/1
25 TABLET, FILM COATED ORAL 3 TIMES DAILY PRN
Status: DISCONTINUED | OUTPATIENT
Start: 2022-04-23 | End: 2022-04-25 | Stop reason: HOSPADM

## 2022-04-23 RX ORDER — POTASSIUM CHLORIDE 750 MG/1
40 TABLET, EXTENDED RELEASE ORAL ONCE
Status: COMPLETED | OUTPATIENT
Start: 2022-04-23 | End: 2022-04-23

## 2022-04-23 RX ADMIN — SENNOSIDES AND DOCUSATE SODIUM 2 TABLET: 8.6; 5 TABLET ORAL at 08:39

## 2022-04-23 RX ADMIN — ACETAMINOPHEN 650 MG: 325 TABLET ORAL at 16:37

## 2022-04-23 RX ADMIN — CARVEDILOL 3.12 MG: 3.12 TABLET, FILM COATED ORAL at 17:19

## 2022-04-23 RX ADMIN — PANTOPRAZOLE SODIUM 40 MG: 40 TABLET, DELAYED RELEASE ORAL at 08:37

## 2022-04-23 RX ADMIN — OXYCODONE HYDROCHLORIDE 5 MG: 5 TABLET ORAL at 22:27

## 2022-04-23 RX ADMIN — METFORMIN HYDROCHLORIDE 500 MG: 500 TABLET, FILM COATED ORAL at 08:36

## 2022-04-23 RX ADMIN — OXYCODONE HYDROCHLORIDE 5 MG: 5 TABLET ORAL at 12:43

## 2022-04-23 RX ADMIN — POTASSIUM CHLORIDE 40 MEQ: 750 TABLET, EXTENDED RELEASE ORAL at 12:42

## 2022-04-23 RX ADMIN — PREDNISONE 6 MG: 1 TABLET ORAL at 09:50

## 2022-04-23 RX ADMIN — ONDANSETRON 4 MG: 4 TABLET, ORALLY DISINTEGRATING ORAL at 11:26

## 2022-04-23 RX ADMIN — POLYETHYLENE GLYCOL 3350 17 G: 17 POWDER, FOR SOLUTION ORAL at 08:39

## 2022-04-23 RX ADMIN — OXYCODONE HYDROCHLORIDE 5 MG: 5 TABLET ORAL at 00:15

## 2022-04-23 RX ADMIN — METFORMIN HYDROCHLORIDE 500 MG: 500 TABLET, FILM COATED ORAL at 17:18

## 2022-04-23 RX ADMIN — LAMOTRIGINE 100 MG: 100 TABLET ORAL at 19:11

## 2022-04-23 RX ADMIN — EMPAGLIFLOZIN 25 MG: 25 TABLET, FILM COATED ORAL at 08:37

## 2022-04-23 RX ADMIN — LIDOCAINE 1 PATCH: 246 PATCH TOPICAL at 08:44

## 2022-04-23 RX ADMIN — METHOCARBAMOL 500 MG: 500 TABLET ORAL at 08:36

## 2022-04-23 RX ADMIN — PREGABALIN 25 MG: 25 CAPSULE ORAL at 21:00

## 2022-04-23 RX ADMIN — GLIPIZIDE 10 MG: 10 TABLET ORAL at 16:37

## 2022-04-23 RX ADMIN — INSULIN ASPART 2 UNITS: 100 INJECTION, SOLUTION INTRAVENOUS; SUBCUTANEOUS at 17:12

## 2022-04-23 RX ADMIN — POTASSIUM CHLORIDE 20 MEQ: 750 TABLET, EXTENDED RELEASE ORAL at 08:37

## 2022-04-23 RX ADMIN — Medication 1000 MCG: at 08:37

## 2022-04-23 RX ADMIN — CARVEDILOL 3.12 MG: 3.12 TABLET, FILM COATED ORAL at 09:49

## 2022-04-23 RX ADMIN — INSULIN ASPART 1 UNITS: 100 INJECTION, SOLUTION INTRAVENOUS; SUBCUTANEOUS at 12:38

## 2022-04-23 RX ADMIN — DIPHENHYDRAMINE HYDROCHLORIDE 25 MG: 25 CAPSULE ORAL at 19:10

## 2022-04-23 RX ADMIN — ACETAMINOPHEN 650 MG: 325 TABLET ORAL at 03:04

## 2022-04-23 RX ADMIN — ATORVASTATIN CALCIUM 10 MG: 10 TABLET, FILM COATED ORAL at 21:00

## 2022-04-23 RX ADMIN — LAMOTRIGINE 100 MG: 100 TABLET ORAL at 08:38

## 2022-04-23 RX ADMIN — GLIPIZIDE 10 MG: 10 TABLET ORAL at 08:37

## 2022-04-23 RX ADMIN — Medication 325 MG: at 08:37

## 2022-04-23 RX ADMIN — OXYCODONE HYDROCHLORIDE 5 MG: 5 TABLET ORAL at 16:37

## 2022-04-23 RX ADMIN — DULOXETINE HYDROCHLORIDE 60 MG: 60 CAPSULE, DELAYED RELEASE PELLETS ORAL at 08:37

## 2022-04-23 RX ADMIN — MIRABEGRON 25 MG: 25 TABLET, FILM COATED, EXTENDED RELEASE ORAL at 21:00

## 2022-04-23 RX ADMIN — METHOCARBAMOL 500 MG: 500 TABLET ORAL at 19:11

## 2022-04-23 RX ADMIN — LISINOPRIL 5 MG: 5 TABLET ORAL at 08:38

## 2022-04-23 NOTE — PLAN OF CARE
[facility]      OUTPATIENT PHYSICAL THERAPY EVALUATION  PLAN OF TREATMENT FOR OUTPATIENT REHABILITATION  (COMPLETE FOR INITIAL CLAIMS ONLY)  Patient's Last Name, First Name, M.I.  YOB: 1940  TreyTonya LOPEZ                        Provider's Name  [facility] Medical Record No.  [id number]                               Onset Date:  04/22/22   Start of Care Date:  04/23/22      Type:     _X_PT   ___OT   ___SLP Medical Diagnosis:  Chronic low back pain, weakness                        PT Diagnosis:  impaired functional mobility   Visits from SOC:  1   _________________________________________________________________________________  Plan of Treatment/Functional Goals    Planned Interventions: balance training, bed mobility training, gait training, home exercise program, neuromuscular re-education, patient/family education, ROM (range of motion), strengthening, transfer training, wheelchair management/propulsion training, home program guidelines, progressive activity/exercise     Goals: See Physical Therapy Goals on Care Plan in [employer] electronic health record.    Therapy Frequency: 5x/week  Predicted Duration of Therapy Intervention: 04/30/22  _________________________________________________________________________________    I CERTIFY THE NEED FOR THESE SERVICES FURNISHED UNDER        THIS PLAN OF TREATMENT AND WHILE UNDER MY CARE     (Physician co-signature of this document indicates review and certification of the therapy plan).              Certification date from: 04/23/22, Certification date to: 04/30/22    Referring Physician: Caitlin Gregory APRN CNP            Initial Assessment        See Physical Therapy evaluation dated 04/23/22 in Epic electronic health record.

## 2022-04-23 NOTE — PROGRESS NOTES
"BP (!) 149/65   Pulse 83   Temp 98.2  F (36.8  C) (Oral)   Resp 16   Ht 1.651 m (5' 5\")   Wt 89.8 kg (198 lb)   LMP  (LMP Unknown)   SpO2 96%   BMI 32.95 kg/m           Goal Outcome Evaluation:        - Adequate pain control on oral analgesia: Not met    - Vital Signs normal or at patient baseline: Met       - Tolerating oral intake to maintain hydration: Met    - Diagnostic tests and consults completed and resulted: Not met    - Cleared for discharge from consultants' standpoint if involved in care: Ongoing. PT met with the pt, see note.    - Safe disposition plan has been identified: Not met     - Return to baseline functional status: Not met      "

## 2022-04-23 NOTE — PLAN OF CARE
Goal Outcome Evaluation:      - Adequate pain control on oral analgesia: Not met    - Vital Signs normal or at patient baseline: Met  /61 (BP Location: Right arm)   Pulse 76   Temp 97.8  F (36.6  C) (Oral)   Resp 18  SpO2 94% on RA    - Tolerating oral intake to maintain hydration: Met    - Diagnostic tests and consults completed and resulted: Not met    - Cleared for discharge from consultants' standpoint if involved in care: Ongoing. PT consult in AM.     - Safe disposition plan has been identified: Not met     - Return to baseline functional status: Not met

## 2022-04-23 NOTE — PLAN OF CARE
Goal Outcome Evaluation:  - Adequate pain control on oral analgesia: Not met  - Vital Signs normal or at patient baseline: Met  - Tolerating oral intake to maintain hydration: Met  - Diagnostic tests and consults completed and resulted: Not met  - Cleared for discharge from consultants' standpoint if involved in care: Not met   - Safe disposition plan has been identified: Not met   - Return to baseline functional status: Not met

## 2022-04-23 NOTE — PROGRESS NOTES
"   04/23/22 1242   Quick Adds   Quick Adds Certification   Type of Visit Initial PT Evaluation   Living Environment   People in Home alone   Current Living Arrangements apartment   Home Accessibility no concerns   Transportation Anticipated family or friend will provide   Living Environment Comments Pt lives in 7th floor apartment, elevator access. Walk in shower. Son provides transportation.   Self-Care   Usual Activity Tolerance fair   Current Activity Tolerance fair   Regular Exercise No  (recently at [place] [facility])   Equipment Currently Used at Home walker, rolling;grab bar, tub/shower;raised toilet seat;shower chair   Fall history within last six months yes   Number of times patient has fallen within last six months 2   Activity/Exercise/Self-Care Comment Pt does not currently have 24/7 support but Son has been assisting iwth meals, laundry, housekeeping, and mobility as able. pt endorses she falls anytime she tries to move by herself. uses seat on her 4WW to move around in her home, she is looking into renting a w/c.   General Information   Onset of Illness/Injury or Date of Surgery 04/22/22   Referring Physician Caitlin Gregory, APRN CNP   Patient/Family Therapy Goals Statement (PT) return home, reduce falls   Pertinent History of Current Problem (include personal factors and/or comorbidities that impact the POC) per EMR \"Tonya Yang is a 81 year old old female Past medical history significant for hypertension, diabetes mellitus type 2, neuropathy, polyarthralgia, chronic low back pain, severe spinal stenosis recently hospitalized at [facility] 3/7-3/13 for pneumonia, severe sepsis, acute on chronic low back pain,admitted to [facility] from 04/07/2022- 04/15/2022 for mechanical fall now presents with ED with complaints of a fall after leaving AMA from [place] [facility] on 4/15\"   Existing Precautions/Restrictions fall   General Observations Activity: Ambulate with assist   Cognition   Affect/Mental Status (Cognition) " unable/difficult to assess;WFL   Orientation Status (Cognition) oriented to;person;place;situation   Pain Assessment   Patient Currently in Pain Yes, see Vital Sign flowsheet   Integumentary/Edema   Integumentary/Edema Comments Some slight edema, pt reprots hisotry of edema with tx in the past   Posture    Posture Forward head position;Protracted shoulders   Range of Motion (ROM)   ROM Comment Limited greatly by pain and weakness, declined therapist to assist due to hypersensitivity   Strength (Manual Muscle Testing)   Strength Comments generalized weakness, unable to complete SLR on R side.   Bed Mobility   Bed Mobility supine-sit;scooting/bridging   Scooting/Bridging Mason (Bed Mobility) maximum assist (25% patient effort)   Supine-Sit Mason (Bed Mobility) minimum assist (75% patient effort)   Assistive Device (Bed Mobility) bed rails   Transfers   Transfers sit-stand transfer   Transfer Safety Concerns Noted decreased sequencing ability   Impairments Contributing to Impaired Transfers impaired balance;pain;decreased strength   Sit-Stand Transfer   Sit-Stand Mason (Transfers) minimum assist (75% patient effort);2 person assist   Assistive Device (Sit-Stand Transfers) walker, front-wheeled   Gait/Stairs (Locomotion)   Mason Level (Gait) minimum assist (75% patient effort)   Assistive Device (Gait) walker, front-wheeled   Pattern (Gait) step-to   Deviations/Abnormal Patterns (Gait) antalgic;juarez decreased;stride length decreased;weight shifting decreased   Balance   Balance Comments impaired standing balance, requries UE support   Sensory Examination   Sensory Perception Comments baseline neuropathy, hypersensitve to touch LLE   Clinical Impression   Criteria for Skilled Therapeutic Intervention Yes, treatment indicated   PT Diagnosis (PT) impaired functional mobility   Influenced by the following impairments transfers, gait, bed mobility   Functional limitations due to impairments  pain, impaired strength, impaired activity tolerance   Clinical Presentation (PT Evaluation Complexity) Stable/Uncomplicated   Clinical Presentation Rationale clinical judgement   Clinical Decision Making (Complexity) low complexity   Planned Therapy Interventions (PT) balance training;bed mobility training;gait training;home exercise program;neuromuscular re-education;patient/family education;ROM (range of motion);strengthening;transfer training;wheelchair management/propulsion training;home program guidelines;progressive activity/exercise   Anticipated Equipment Needs at Discharge (PT) wheelchair   Risk & Benefits of therapy have been explained evaluation/treatment results reviewed;care plan/treatment goals reviewed;risks/benefits reviewed;current/potential barriers reviewed;participants voiced agreement with care plan;participants included;patient   PT Discharge Planning   PT Discharge Recommendation (DC Rec) Transitional Care Facility;home with assist;home with home care physical therapy   PT Rationale for DC Rec Patient is below functional baseline, although unclear how independent she has been. Pt was at TCU, then unsuccessful when she was d/c home due to a fall. Pt is hesistent to d/c to TCU, although would benefit to increase strenght, pain management, and increase ind with mobility. if pt were to d/c home, would recommend 24/7 assist with mobility and ADLs, 2WW, w/c for community mobility, and  PT. Pt is agreeable, hesistent to d/c to TCU.   PT Brief overview of current status Ax1-2 for gait, Ax1 pivot transfers to commode with walker, fear of falling, prefers to use gait belt   Therapy Certification   Start of care date 04/23/22   Certification date from 04/23/22   Certification date to 04/30/22   Medical Diagnosis Chronic low back pain, weakness   Total Evaluation Time   Total Evaluation Time (Minutes) 10   Physical Therapy Goals   PT Frequency 5x/week   PT Predicted Duration/Target Date for Goal  Attainment 04/30/22   PT Goals Bed Mobility;Transfers;Gait;Wheelchair Mobility   PT: Bed Mobility Independent;Supine to/from sit   PT: Transfers Modified independent;Sit to/from stand;Bed to/from chair;Assistive device   PT: Gait Modified independent;100 feet;Rolling walker;Standard walker   PT: Wheelchair Mobility 150 feet;Caregiver SBA;manual wheelchair

## 2022-04-23 NOTE — PROGRESS NOTES
"BP (!) 149/65   Pulse 83   Temp 97.9  F (36.6  C) (Oral)   Resp 15   Ht 1.651 m (5' 5\")   Wt 89.8 kg (198 lb)   LMP  (LMP Unknown)   SpO2 95%   BMI 32.95 kg/m          Goal Outcome Evaluation:        - Adequate pain control on oral analgesia: Not met    - Vital Signs normal or at patient baseline: Met      - Tolerating oral intake to maintain hydration: Met    - Diagnostic tests and consults completed and resulted: Not met    - Cleared for discharge from consultants' standpoint if involved in care: Ongoing. PT consult in AM.     - Safe disposition plan has been identified: Not met     - Return to baseline functional status: Not met      "

## 2022-04-23 NOTE — PROGRESS NOTES
Patient interviewed and examined. Labs, imaging and chart notes reviewed.   Tonya Yang presented to the ED yesterday following a fall onto her buttocks at home. She had been hospitalized at [facility] in March of this year with sepsis associated with community acquired pneumonia. During that hospitalization she was found to have severe spinal stenosis and had epidural steroid injection for lower back pain. She had complete spinal imaging with MRI of the cervical, thoracic and lumbar spine. She was noted to have a non specific 1.2 x 1.4  soft tissue mass in the left anterolateral thoracic paraspinal soft tissue. Chest CT also demonstrated the mass and lymph node was favored. 6 month follow up was recommended. She was found to have multilevel DDD with foraminal compromise particularly on the left. She was discharged to TCU.  She was re-admitted to [facility] on 4/7/22 after a fall and with unhappiness with her TCU facility ([facility]). There was concern regarding polypharmacy with regard to her pain management.  She was discharged to a new TCU facility ([facility] on 4/15/22, but left the facility the same day due to delay and neglect by staff on her arrival. She returned home to her apartment. She lives alone. She has a son in town who lives about 3 miles away from her and an ex daughter-in-law who has offered to help out. She presented to our ED when she had a fall onto her buttocks at home due to weakness while trying to get towels and toilet paper from her linen closet. She complained of pain in the tailbone and left hand. She was unable to get up off the floor and had to call her son for assistance.In the ED CT of the abdomen and pelvis demonstrated no fractures. EKG had no acute changes. Electrolytes notable for mild hypokalemia and creatinine of 1.14. Troponin was normal. CK was normal. CBC notable for mild anemia with hemoglobin of 9.9. UA was normal. XR of the sacrum and coccyx had no  displaced fractures. She was admitted to ED observation for possible TCU placement.     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     Hepatic abscess      Hyperlipidemia      Hypertension      Irritable bowel syndrome      Meniere disease      Neuropathy      Noninfectious ileitis      Peritoneal abscess (H)      Spinal stenosis of lumbar region      Steroid long-term use      Vaginitis, atrophic, postmenopausal      Vitamin D deficiency        Past Surgical History:   Procedure Laterality Date     CATARACT IOL, RT/LT Left 7/2013     FOOT SURGERY      toe     GI SURGERY       IR LUMBAR/SACRAL TRANSFOR INJ BILATERAL Bilateral 3/11/2022     LAPAROSCOPIC HEPATECTOMY PARTIAL  11/4/2013    Procedure: LAPAROSCOPIC HEPATECTOMY PARTIAL;  Laparoscopic Debridement of Liver Abcess;  Surgeon: Sepideh Green MD;  Location: [facility] OR     ORTHOPEDIC SURGERY       PICC INSERTION  8/28/2013    5fr DL Power PICC, 41cm (1cm external length) in the R lateral brachial vein with tip in the SVC RA junction.     RECTAL SURGERY      1970s     VASCULAR SURGERY         Family History   Problem Relation Age of Onset     Cerebrovascular Disease Mother      Heart Disease Mother      Heart Disease Father      Heart Disease Brother      Diabetes No family hx of      Coronary Artery Disease No family hx of      Hypertension No family hx of      Hyperlipidemia No family hx of      Breast Cancer No family hx of      Colon Cancer No family hx of      Prostate Cancer No family hx of      Other Cancer No family hx of      Depression No family hx of      Anxiety Disorder No  "family hx of      Mental Illness No family hx of      Substance Abuse No family hx of      Anesthesia Reaction No family hx of      Asthma No family hx of      Osteoporosis No family hx of      Genetic Disorder No family hx of      Thyroid Disease No family hx of      Obesity No family hx of      Unknown/Adopted No family hx of        Social History     Tobacco Use     Smoking status: Former Smoker     Types: Cigarettes     Quit date: 11/15/1987     Years since quittin.4     Smokeless tobacco: Former User   Substance Use Topics     Alcohol use: Not Currently     Alcohol/week: 0.0 standard drinks     Comment: MALISSA -paco since      Physical Exam:  Elderly female in NAD. Able to sit up using bed rail. Needs assistance to lift legs back in bed.  BP (!) 149/65   Pulse 83   Temp 98.2  F (36.8  C) (Oral)   Resp 16   Ht 1.651 m (5' 5\")   Wt 89.8 kg (198 lb)   LMP  (LMP Unknown)   SpO2 96%   BMI 32.95 kg/m    HEENT: PERRLA. EOMI. [place].  Lungs: Clear.  Cardiac: RRR. Normal S1 and S2. No JVD.  Abdomen: Obese, soft, non tender.  Extrem: Rubbery, mobile subcutaneous nodules on arms most likely to be lipomas, though fibromas and neurofibromas cannot be excluded.  Back: No focal tenderness.   Neuro: CN intact other than hearing. Speech intact. Dysesthesias lateral arms bilaterally. Motor diffuse weakness legs, no focal nerve root deficit.  Psych: Normal mood and affect.    Results for orders placed or performed during the hospital encounter of 22 (from the past 48 hour(s))   EKG 12-lead   Result Value Ref Range    Systolic Blood Pressure  mmHg    Diastolic Blood Pressure  mmHg    Ventricular Rate 78 BPM    Atrial Rate 78 BPM    RI Interval 146 ms    QRS Duration 152 ms     ms    QTc 572 ms    P Axis 35 degrees    R AXIS 15 degrees    T Axis 10 degrees    Interpretation ECG       Sinus rhythm  Right bundle branch block  Abnormal ECG  Unconfirmed report - interpretation of this ECG is computer " generated - see medical record for final interpretation    Confirmed by - EMERGENCY ROOM, PHYSICIAN ([id number]),  RONALD MANNING ([id number]) on 4/22/2022 3:33:01 PM     [place] Draw    Narrative    The following orders were created for panel order [place] Draw.  Procedure                               Abnormality         Status                     ---------                               -----------         ------                     Extra Blue Top Tube[[id number]]                              Final result               Extra Red Top Tube[[id number]]                               Final result               Extra Green Top (Lithium...[[id number]]                      Final result               Extra Purple Top Tube[[id number]]                            Final result                 Please view results for these tests on the individual orders.   Troponin I   Result Value Ref Range    Troponin I High Sensitivity 7 <54 ng/L   CBC with platelets differential    Narrative    The following orders were created for panel order CBC with platelets differential.  Procedure                               Abnormality         Status                     ---------                               -----------         ------                     CBC with platelets and d...[[id number]]  Abnormal            Final result                 Please view results for these tests on the individual orders.   Comprehensive metabolic panel   Result Value Ref Range    Sodium 137 133 - 144 mmol/L    Potassium 3.3 (L) 3.4 - 5.3 mmol/L    Chloride 100 94 - 109 mmol/L    Carbon Dioxide (CO2) 29 20 - 32 mmol/L    Anion Gap 8 3 - 14 mmol/L    Urea Nitrogen 30 7 - 30 mg/dL    Creatinine 1.14 (H) 0.52 - 1.04 mg/dL    Calcium 9.6 8.5 - 10.1 mg/dL    Glucose 201 (H) 70 - 99 mg/dL    Alkaline Phosphatase 88 40 - 150 U/L    AST 16 0 - 45 U/L    ALT 28 0 - 50 U/L    Protein Total 6.9 6.8 - 8.8 g/dL    Albumin 3.4 3.4 - 5.0 g/dL    Bilirubin Total 0.4 0.2 - 1.3 mg/dL    GFR  Estimate 49 (L) >60 mL/min/1.73m2   CK total   Result Value Ref Range     30 - 225 U/L   Extra Blue Top Tube   Result Value Ref Range    Hold Specimen JIC    Extra Red Top Tube   Result Value Ref Range    Hold Specimen JIC    Extra Green Top (Lithium Heparin) Tube   Result Value Ref Range    Hold Specimen JIC    Extra Purple Top Tube   Result Value Ref Range    Hold Specimen JIC    CBC with platelets and differential   Result Value Ref Range    WBC Count 8.6 4.0 - 11.0 10e3/uL    RBC Count 4.28 3.80 - 5.20 10e6/uL    Hemoglobin 9.9 (L) 11.7 - 15.7 g/dL    Hematocrit 36.3 35.0 - 47.0 %    MCV 85 78 - 100 fL    MCH 23.1 (L) 26.5 - 33.0 pg    MCHC 27.3 (L) 31.5 - 36.5 g/dL    RDW 18.8 (H) 10.0 - 15.0 %    Platelet Count 334 150 - 450 10e3/uL    % Neutrophils 59 %    % Lymphocytes 26 %    % Monocytes 8 %    % Eosinophils 6 %    % Basophils 1 %    % Immature Granulocytes 0 %    NRBCs per 100 WBC 0 <1 /100    Absolute Neutrophils 5.1 1.6 - 8.3 10e3/uL    Absolute Lymphocytes 2.2 0.8 - 5.3 10e3/uL    Absolute Monocytes 0.7 0.0 - 1.3 10e3/uL    Absolute Eosinophils 0.5 0.0 - 0.7 10e3/uL    Absolute Basophils 0.1 0.0 - 0.2 10e3/uL    Absolute Immature Granulocytes 0.0 <=0.4 10e3/uL    Absolute NRBCs 0.0 10e3/uL   XR Pelvis 1/2 Views    Narrative    1 views pelvis radiograph(s) 4/22/2022 10:23 AM    History: fall, pain    Additional History from EMR: Patient fell today in her bathroom and  landed on her buttocks. Currently having pain in the tailbone area.    Comparison: CT abdomen and pelvis 10/1/2013.    Findings:    1 view(s) of the pelvis were obtained.     No acute osseous abnormality.    Degenerative changes of the hips bilaterally with marked joint space  narrowing of the left hip. Degenerative changes of the lower lumbar  spine.    Small radiodensities project over the iliac bones bilaterally,  presumably soft tissue calcifications or granulomas.    Within the pelvic inlet, there is a small radiodensity within  the  center of the soft tissue, presumably a uterine leiomyoma.    Sacrum and innominate bones are partially obscured by overlying bowel  gas/fecal content.    Pelvic phlebolith.      Impression    Impression:  1. No acute osseous abnormality.  2. Degenerative changes of the hips and lower lumbar spine.    I have personally reviewed the examination and initial interpretation  and I agree with the findings.    LYNN MACIEL MD         SYSTEM ID:  [id number]   XR Sacrum and Coccyx 2 Views    Narrative    3 views pelvis radiograph(s) 4/22/2022 10:33 AM    History: fall, pain    Additional History from EMR: Fall in bathroom this morning with pain  over the tailbone.    Comparison: CT abdomen and pelvis 10/1/2013    Findings:    AP and lateral view(s) of the sacrum/sacroiliac joints were obtained.     There are overlying densities at the sacrococcygeal junction that  appears different from CT 2013, without definite evidence of fracture.  If there is high clinical suspicion for sacral/coccygeal fracture,  recommend CT with sagittal reconstructions to better delineate the  sacral and coccygeal bones.    Degenerative changes of the hips bilaterally, with marked joint space  narrowing of the left hip. Small radiodensities in the center of the  pelvic inlet, likely representing a uterine leiomyoma.    No erosion or ankylosis of either sacroiliac joints.      Sacrum is partially obscured by overlying bowel gas/fecal content.    Pelvic phleboliths.      Impression    Impression:  No definite evidence of fracture. See above if additional imaging is  warranted.    I have personally reviewed the examination and initial interpretation  and I agree with the findings.    LYNN MACIEL MD         SYSTEM ID:  [id number]   UA with Microscopic reflex to Culture    Specimen: Urine, Clean Catch   Result Value Ref Range    Color Urine Light Yellow Colorless, Straw, Light Yellow, Yellow    Appearance Urine Clear Clear    Glucose Urine >=1000  (A) Negative mg/dL    Bilirubin Urine Negative Negative    Ketones Urine Negative Negative mg/dL    Specific Gravity Urine 1.023 1.003 - 1.035    Blood Urine Negative Negative    pH Urine 5.0 5.0 - 7.0    Protein Albumin Urine Negative Negative mg/dL    Urobilinogen Urine Normal Normal, 2.0 mg/dL    Nitrite Urine Negative Negative    Leukocyte Esterase Urine Negative Negative    Bacteria Urine Few (A) None Seen /HPF    RBC Urine <1 <=2 /HPF    WBC Urine 4 <=5 /HPF    Squamous Epithelials Urine 1 <=1 /HPF    Transitional Epithelials Urine <1 <=1 /HPF    Narrative    Urine Culture not indicated   Asymptomatic COVID-19 Virus (Coronavirus) by PCR Nasopharyngeal    Specimen: Nasopharyngeal; Swab   Result Value Ref Range    SARS CoV2 PCR Negative Negative, Testing sent to reference lab. Results will be returned via unsolicited result    Narrative    Testing was performed using the Xpert Xpress SARS-CoV-2 Assay on the  Cepheid Gene-Xpert Instrument Systems. Additional information about  this Emergency Use Authorization (EUA) assay can be found via the Lab  Guide. This test should be ordered for the detection of SARS-CoV-2 in  individuals who meet SARS-CoV-2 clinical and/or epidemiological  criteria. Test performance is unknown in asymptomatic patients. This  test is for in vitro diagnostic use under the FDA EUA for  laboratories certified under CLIA to perform high complexity testing.  This test has not been FDA cleared or approved. A negative result  does not rule out the presence of PCR inhibitors in the specimen or  target RNA in concentration below the limit of detection for the  assay. The possibility of a false negative should be considered if  the patient's recent exposure or clinical presentation suggests  COVID-19. This test was validated by the [facility]. This laboratory is certified under  the Clinical Laboratory Improvement Amendments of 1988 (CLIA-88)  as  qualified to perform high complexity laboratory testing.     Abd/pelvis CT no contrast - [place] Protocol    Narrative    EXAMINATION: CT ABDOMEN PELVIS W/O CONTRAST, 4/22/2022 1:01 PM    TECHNIQUE:  Helical CT images from the lung bases through the  symphysis pubis were obtained without IV contrast.     COMPARISON: 10/1/2013    HISTORY: Trauma - Abdominal/Pelvic Injury; Please evaluate for  sacral/coccygeal fractures    FINDINGS:  Unenhanced liver, gallbladder, bile ducts, pancreas, spleen, adrenal  glands are unremarkable. Multiple renal cysts bilaterally. Distended  mildly trabeculated urinary bladder. Calcified fibroid at the uterine  fundus. Sigmoid prominent colonic diverticulosis without acute  inflammation. Appendix is normal. No dilated bowel. No free fluid or  free air. No adenopathy. Aortoiliac atherosclerotic calcifications  without aneurysm.    Lower thorax:  Atelectasis in the lung bases.    Bones and soft tissues:  Small fat-containing periumbilical hernia. Multifocal areas of fat  necrosis in the simultaneous fat overlying the pelvis and lower  abdomen posteriorly. No acute or suspicious osseous lesion. No acute  fracture of the sacrum or coccyx. Diffusely demineralized appearing  bones.      Impression    IMPRESSION:  No acute finding.    I have personally reviewed the examination and initial interpretation  and I agree with the findings.    SO BUSTILLOS MD         SYSTEM ID:  [id number]   Glucose by meter   Result Value Ref Range    GLUCOSE BY METER POCT 202 (H) 70 - 99 mg/dL   Glucose by meter   Result Value Ref Range    GLUCOSE BY METER POCT 195 (H) 70 - 99 mg/dL   Glucose by meter   Result Value Ref Range    GLUCOSE BY METER POCT 234 (H) 70 - 99 mg/dL   Glucose by meter   Result Value Ref Range    GLUCOSE BY METER POCT 103 (H) 70 - 99 mg/dL   CBC with platelets   Result Value Ref Range    WBC Count 7.5 4.0 - 11.0 10e3/uL    RBC Count 4.37 3.80 - 5.20 10e6/uL    Hemoglobin 10.0 (L) 11.7 -  15.7 g/dL    Hematocrit 37.0 35.0 - 47.0 %    MCV 85 78 - 100 fL    MCH 22.9 (L) 26.5 - 33.0 pg    MCHC 27.0 (L) 31.5 - 36.5 g/dL    RDW 18.7 (H) 10.0 - 15.0 %    Platelet Count 348 150 - 450 10e3/uL   Basic metabolic panel   Result Value Ref Range    Sodium 140 133 - 144 mmol/L    Potassium 3.2 (L) 3.4 - 5.3 mmol/L    Chloride 103 94 - 109 mmol/L    Carbon Dioxide (CO2) 32 20 - 32 mmol/L    Anion Gap 5 3 - 14 mmol/L    Urea Nitrogen 20 7 - 30 mg/dL    Creatinine 0.86 0.52 - 1.04 mg/dL    Calcium 10.1 8.5 - 10.1 mg/dL    Glucose 124 (H) 70 - 99 mg/dL    GFR Estimate 68 >60 mL/min/1.73m2   Magnesium   Result Value Ref Range    Magnesium 2.0 1.6 - 2.3 mg/dL   Phosphorus   Result Value Ref Range    Phosphorus 2.9 2.5 - 4.5 mg/dL   Glucose by meter   Result Value Ref Range    GLUCOSE BY METER POCT 161 (H) 70 - 99 mg/dL     Impression:  Elderly woman with a history of asthma, chronic pain, COPD, bipolar 1 disorder, type 2 DM, GERD, [place], HTN,Meniere's, past hepatic and peritoneal abscess, recent community acquired pneumonia with sepsis, spinal stenosis presents with generalized weakness and falls at home. She was hospitalized at [facility] for pneumonia in March of this year and discharged to [facility] which she disliked. She had a fall 4/7 and was admitted again to [facility] for placement. She went to [facility] in [place] and intensely disliked the nursing home and care. She discharged herself and returned home to her apartment where she lives alone. She has a son in [facility] who can assist to a limited degree. She has a daughter in [place] who will be coming to visit in 2 weeks. She at present appears to meet criteria for TCU placement due to generalized weakness and difficulty with transfers and ambulation with a walker. She is currently resistant to TCU placement. We discussed that if she returns home, she will need more in person assistance with ADLs for safety. We discussed that while some in home services are  available such as home PT and limited assistance with housekeeping and bathing, that additional care home care and PCA type care would generally not be covered by Medicare or most insurance plans. She was encouraged to work with social work and her children to find a satisfactory TCU facility. We discussed that with physical therapy she likely would gain strength in her legs which would allow eventual safe return to home.    Papi Gagnon MD

## 2022-04-23 NOTE — PLAN OF CARE
Goal Outcome Evaluation:        - Adequate pain control on oral analgesia: met    - Vital Signs normal or at patient baseline: Met    - Tolerating oral intake to maintain hydration: Met    - Diagnostic tests and consults completed and resulted: Not met    - Cleared for discharge from consultants' standpoint if involved in care: not met    - Safe disposition plan has been identified: Not met     - Return to baseline functional status: Not met

## 2022-04-24 LAB
GLUCOSE BLDC GLUCOMTR-MCNC: 102 MG/DL (ref 70–99)
GLUCOSE BLDC GLUCOMTR-MCNC: 119 MG/DL (ref 70–99)
GLUCOSE BLDC GLUCOMTR-MCNC: 125 MG/DL (ref 70–99)
GLUCOSE BLDC GLUCOMTR-MCNC: 134 MG/DL (ref 70–99)
GLUCOSE BLDC GLUCOMTR-MCNC: 78 MG/DL (ref 70–99)

## 2022-04-24 PROCEDURE — 82962 GLUCOSE BLOOD TEST: CPT

## 2022-04-24 PROCEDURE — 99225 PR SUBSEQUENT OBSERVATION CARE,LEVEL II: CPT | Performed by: INTERNAL MEDICINE

## 2022-04-24 PROCEDURE — 250N000013 HC RX MED GY IP 250 OP 250 PS 637: Performed by: PHYSICIAN ASSISTANT

## 2022-04-24 PROCEDURE — G0378 HOSPITAL OBSERVATION PER HR: HCPCS

## 2022-04-24 PROCEDURE — 250N000013 HC RX MED GY IP 250 OP 250 PS 637: Performed by: NURSE PRACTITIONER

## 2022-04-24 PROCEDURE — 250N000012 HC RX MED GY IP 250 OP 636 PS 637: Performed by: NURSE PRACTITIONER

## 2022-04-24 RX ORDER — GABAPENTIN 100 MG/1
200 CAPSULE ORAL 2 TIMES DAILY
COMMUNITY
End: 2022-05-31

## 2022-04-24 RX ORDER — HYDROXYZINE HYDROCHLORIDE 25 MG/1
25 TABLET, FILM COATED ORAL 3 TIMES DAILY PRN
COMMUNITY

## 2022-04-24 RX ORDER — OXYBUTYNIN CHLORIDE 10 MG/1
10 TABLET, EXTENDED RELEASE ORAL DAILY
Status: ON HOLD | COMMUNITY
End: 2022-04-24

## 2022-04-24 RX ORDER — OXYCODONE AND ACETAMINOPHEN 5; 325 MG/1; MG/1
1 TABLET ORAL EVERY 6 HOURS PRN
Status: ON HOLD | COMMUNITY
End: 2022-05-31

## 2022-04-24 RX ORDER — TRIAMTERENE/HYDROCHLOROTHIAZID 37.5-25 MG
TABLET ORAL
COMMUNITY
End: 2022-05-26

## 2022-04-24 RX ADMIN — ACETAMINOPHEN 650 MG: 325 TABLET ORAL at 00:34

## 2022-04-24 RX ADMIN — METFORMIN HYDROCHLORIDE 500 MG: 500 TABLET, FILM COATED ORAL at 18:00

## 2022-04-24 RX ADMIN — GLIPIZIDE 10 MG: 10 TABLET ORAL at 08:11

## 2022-04-24 RX ADMIN — MIRABEGRON 25 MG: 25 TABLET, FILM COATED, EXTENDED RELEASE ORAL at 21:15

## 2022-04-24 RX ADMIN — LAMOTRIGINE 100 MG: 100 TABLET ORAL at 21:16

## 2022-04-24 RX ADMIN — OXYCODONE HYDROCHLORIDE 5 MG: 5 TABLET ORAL at 19:22

## 2022-04-24 RX ADMIN — LIDOCAINE 1 PATCH: 246 PATCH TOPICAL at 08:09

## 2022-04-24 RX ADMIN — OXYCODONE HYDROCHLORIDE 5 MG: 5 TABLET ORAL at 23:33

## 2022-04-24 RX ADMIN — OXYCODONE HYDROCHLORIDE 5 MG: 5 TABLET ORAL at 08:10

## 2022-04-24 RX ADMIN — CARVEDILOL 3.12 MG: 3.12 TABLET, FILM COATED ORAL at 18:00

## 2022-04-24 RX ADMIN — PREGABALIN 25 MG: 25 CAPSULE ORAL at 21:15

## 2022-04-24 RX ADMIN — GLIPIZIDE 10 MG: 10 TABLET ORAL at 17:28

## 2022-04-24 RX ADMIN — PREDNISONE 6 MG: 1 TABLET ORAL at 09:47

## 2022-04-24 RX ADMIN — POTASSIUM CHLORIDE 20 MEQ: 750 TABLET, EXTENDED RELEASE ORAL at 08:01

## 2022-04-24 RX ADMIN — NYSTATIN: 100000 CREAM TOPICAL at 18:00

## 2022-04-24 RX ADMIN — LISINOPRIL 5 MG: 5 TABLET ORAL at 08:01

## 2022-04-24 RX ADMIN — Medication 1000 MCG: at 08:01

## 2022-04-24 RX ADMIN — OXYCODONE HYDROCHLORIDE 5 MG: 5 TABLET ORAL at 14:12

## 2022-04-24 RX ADMIN — CARVEDILOL 3.12 MG: 3.12 TABLET, FILM COATED ORAL at 08:01

## 2022-04-24 RX ADMIN — Medication 325 MG: at 08:00

## 2022-04-24 RX ADMIN — DULOXETINE HYDROCHLORIDE 60 MG: 60 CAPSULE, DELAYED RELEASE PELLETS ORAL at 08:00

## 2022-04-24 RX ADMIN — LAMOTRIGINE 100 MG: 100 TABLET ORAL at 08:00

## 2022-04-24 RX ADMIN — METFORMIN HYDROCHLORIDE 500 MG: 500 TABLET, FILM COATED ORAL at 08:02

## 2022-04-24 RX ADMIN — ATORVASTATIN CALCIUM 10 MG: 10 TABLET, FILM COATED ORAL at 21:16

## 2022-04-24 RX ADMIN — NYSTATIN: 100000 CREAM TOPICAL at 08:13

## 2022-04-24 RX ADMIN — PANTOPRAZOLE SODIUM 40 MG: 40 TABLET, DELAYED RELEASE ORAL at 08:17

## 2022-04-24 RX ADMIN — EMPAGLIFLOZIN 25 MG: 25 TABLET, FILM COATED ORAL at 08:00

## 2022-04-24 NOTE — PROGRESS NOTES
ED OBSERVATION PROGRESS NOTE:  S: Tonya Yang is a 81 year old old female Past medical history significant for hypertension, diabetes mellitus type 2, neuropathy, polyarthralgia, chronic low back pain, severe spinal stenosis recently hospitalized at [facility] 3/7-3/13 for pneumonia, severe sepsis, acute on chronic low back pain,admitted to [facility] from 04/07/2022- 04/15/2022 for mechanical fall now presents with ED with complaints of a fall after leaving AMA from TCU on 4/15.     Chief Complaint   Patient presents with     Social Work Services     Fall     1. Fall, initial encounter    2. Contusion of coccyx, initial encounter    3. Hypokalemia        Problem List:  Patient Active Problem List   Diagnosis     Postmenopausal atrophic vaginitis     IBS (irritable bowel syndrome)     Meniere's disease (cochlear hydrops) - on prednisone and triamterene hydrochlorothiazide     GERD (gastroesophageal reflux disease)     CKD (chronic kidney disease) stage 3, GFR 30-59 ml/min (H)     Health Care Home     Iron deficiency anemia due to chronic blood loss     Deafness     Abscess,& FB in liver in 4-10 & 4-11 w recurrent pain in RUQ  in 7-13 s/po lap I&D and unroofing sans finding of an FB  in 11-13      Elevated liver enzymes since 12-13 liver abscess I&D     Temporomandibular joint disorder     Bipolar disorder, in full remission, most recent episode depressed (H)     Diarrhea r/o exacerbated by metformin -since 30's     Baker's cyst, left     Lower urinary tract infectious disease     Left-sided low back pain with left-sided sciatica since 1993 w reoccurrence 6-15      Meniere's disease (cochlear hydrops), LT     Steroid dependent for cochlear hydrops  since 1985      Bilateral back pain     Primary insomnia     Mixed hyperlipidemia     Localized edema-L>R lat malleolus     Microalbuminuria     Leukocytosis w Lt shift      Long-term (current) use of anticoagulants [Z79.01]     Essential hypertension     Ankle  "edema     Sensorineural hearing loss, bilateral     Chronic pain syndrome-issue of broken controlled sub agreement      Bilateral leg edema worsening w her increasing inactivity      Muscle weakness (generalized) worse since 11-16 w drowsiness     Type 2 diabetes mellitus with diabetic nephropathy, without long-term current use of insulin (H)     Class 2 obesity due to excess calories with serious comorbidity and body mass index (BMI) of 37.0 to 37.9 in adult     Bilateral deafness     Confusion     Hyperglycemia     Acute renal failure, unspecified acute renal failure type (H)     Pneumonia of right lower lobe due to infectious organism     Severe sepsis (H)     Type 2 diabetes mellitus with hyperglycemia (H)     Glycosuria     Paravertebral mass     Abnormal finding on CT scan     Bilateral hearing loss, unspecified hearing loss type     Spasm of back muscles     Neural foraminal stenosis of cervical spine     Multilevel spondylosis     Anxiety and depression     Slow transit constipation     Weakness     Fall, initial encounter     Hypokalemia     Contusion of coccyx, initial encounter       MEDS:   No current outpatient medications on file.       ALLERGIES:    Allergies   Allergen Reactions     Propofol Nausea and Vomiting     Shellfish Allergy      Other reaction(s): Dizziness     Capsaicin Rash and Blisters       O:/65 (BP Location: Right arm)   Pulse 62   Temp 98  F (36.7  C) (Oral)   Resp 16   Ht 1.651 m (5' 5\")   Wt 89.8 kg (198 lb)   LMP  (LMP Unknown)   SpO2 98%   BMI 32.95 kg/m      Physical Exam   Constitutional: Pt is oriented to person, place, and time.Pt appears well-developed and well-nourished.   HENT:   Head: Normocephalic and atraumatic.   Eyes: Conjunctivae are normal. Pupils are equal, round, and reactive to light.   Neck: Normal range of motion. Neck supple.   Cardiovascular: Normal rate, regular rhythm, normal heart sounds and intact distal pulses.    Pulmonary/Chest: Effort " normal and breath sounds normal. No respiratory distress. Pt has no wheezes. Pt has no rales  Abdominal: Soft. Bowel sounds are normal. Pt exhibits no distension and no mass. No tenderness. Pt has no rebound and no guarding.   Musculoskeletal: Normal range of motion. Pt exhibits no edema.   Neurological: Pt is alert and oriented to person, place, and time. Normal reflexes.   Skin: Skin is warm and dry. No rash noted.   Psychiatric: Pt has a normal mood and affect. Behavior is normal. Judgment and thought content normal.     Assessment & Plan     Tonya Yang is a 81 year old old female Past medical history significant for hypertension, diabetes mellitus type 2, neuropathy, polyarthralgia, chronic low back pain, severe spinal stenosis recently hospitalized at [facility] 3/7-3/13 for pneumonia, severe sepsis, acute on chronic low back pain,admitted to [facility] from 04/07/2022- 04/15/2022 for mechanical fall now presents with ED with complaints of a fall after leaving AMA from TCU on 4/15.      Assessment and Plan:     ## Fall  ## Coccyx pain  ## Spinal stenosis   ## Chronic lower extremity edema  ## Chronic low back pain  ## Disposition  Patient presents to the ED after a fall at home.Patient had been admitted after a fall to [facility] and was discharged to a TCU. The patient reports she spent 6 hours there and per patient no one had been in to see her for the 6 hours. She called her son and left AMA at midnight. She reports since being home, she has had difficulty ambulating. Reports pain and edema in both feet. She notes neuropathy in both her feet. She has to shuffle to get around her apartment. She reports she went to the bathroom and noted she was out of toilet paper and towels. She went to the linen closet to grab a towel and lost her balance landing on her buttocks. There was no loss of consciousness or prodromal symptoms or head trauma.  She reports it took her 1 1/2 hours to crawl to her  cell phone in her living room and call her son. She complained of pain near her tailbone and subsequently was brought into the ED.  Imaging was negative for any fractures.   She has chronic low back pain. Discussed her case with pain team as she has a pain contract and concerns of polypharmacy. Discussed with patient her goals of care. She would like to return home when the equipment her and her son had ordered is at her apartment, specifically a wheelchair. She does not wish to return to a TCU. The patient reports her son is able to set her up with things and spend about 2 hours a day with her. She notes her ex-daughter-in-law called today and will be able to help her daily. In the ED:  Vitals:      BP: 115/85 Pulse: 83 Temp: 97.6 Resp: 18 SP02:96 % Labs: Na 137, K 3.3, Cr 1.14, GFR 49, LFTS normal, , Troponin 7, , WBC 8.6, Hgb 9.9, plt 334, UA with >1000 glucose, negative for infection.  Medications: Percocet 1 tablet x1, Potassium Bicarb 50 mEq po x 1. Imaging: pelvic xray shows: No acute osseous abnormality. Degenerative changes of the hips and lower lumbar spine. Xray sacrum and coccyx: No definite evidence of fracture. See above if additional imaging is warranted. Ct abdomen/pelvis shows: Small fat-containing periumbilical hernia. Multifocal areas of fat necrosis in the simultaneous fat overlying the pelvis and lower abdomen posteriorly. No acute or suspicious osseous lesion. No acute fracture of the sacrum or coccyx. Diffusely demineralized appearing bones. She was evaluated by PT and was recommended to go to TCU for rehabilitation. Initially and the patient and her son were agreeable and TCU referral were sent. However today, patient son states that he plans to take the patient home tomorrow. She is other HD stable.   - Continue PTA Tylenol, Oxycodone, Robaxin, Voltaren 1% gel, Lyrica  - Add Lidoderm patches alternating with Menthol patches  - Ambulate TID      ## Hypokalemia : Corrected   -  Daily Potassium 20Meq.      ## PAULINE Cr 1.13 on admission. S/p IVF hydration. Cr 0.86 on 4/23  - Hold Lasix     ## Constipation, resolved   - Miralax daily  - Senna S BID      ## Hypertension  - PTA Coreg 3.125 mg BID, Lisinopril 5 mg daily     ## Diabetes mellitus type 2  - PTA Metformin, Glipizide, Jardiance and MSSI  - BG TID and HS  - Hypoglycemic protocol  - High Consistent diet      ## Overactive bladder:  - PTA Myrbetriq     ## Anemia  - PTA Ferrous sulfate      ## Bipolar   ## Depression  ## Anxiety  PTA Lamictal and Cymbalta     ## GERD  - PTA Protonix     Consults: PT  FEN: Regular   DVT prophylaxis: Early ambulation  Code Status: Full  Disposition: Stable vital signs. Patient return to baseline.  All labs/images reviewed    Signed:  Anca Meneses PA-C  April 24, 2022 at 4:55 PM

## 2022-04-24 NOTE — PLAN OF CARE
"Goal Outcome Evaluation:      Observation Goals     - Adequate pain control on oral analgesia: met- back pain controlled with robaxin BID, and tylenol and oxycodone q4h PRN.     - Vital Signs normal or at patient baseline: Met    - Tolerating oral intake to maintain hydration: Met, tolerating consistent carb diet    - Diagnostic tests and consults completed and resulted: Not met, PT hasn't signed off yet    - Cleared for discharge from consultants' standpoint if involved in care: not met    - Safe disposition plan has been identified: Not met, per PT needs TCU     - Return to baseline functional status: Not met   /65 (BP Location: Right arm)   Pulse 62   Temp 98  F (36.7  C) (Oral)   Resp 16   Ht 1.651 m (5' 5\")   Wt 89.8 kg (198 lb)   LMP  (LMP Unknown)   SpO2 98%   BMI 32.95 kg/m                    "

## 2022-04-24 NOTE — PLAN OF CARE
Goal Outcome Evaluation:       - Adequate pain control on oral analgesia: met- back pain controlled with robaxin BID, and tylenol and oxycodone q4h PRN.     - Vital Signs normal or at patient baseline: Met    - Tolerating oral intake to maintain hydration: Met, tolerating consistent carb diet    - Diagnostic tests and consults completed and resulted: Not met, PT hasn't signed off yet    - Cleared for discharge from consultants' standpoint if involved in care: not met    - Safe disposition plan has been identified: Not met, per PT needs TCU     - Return to baseline functional status: Not met

## 2022-04-24 NOTE — PROGRESS NOTES
Care Management Follow Up    Length of Stay (days): 0    Expected Discharge Date:       Concerns to be Addressed:       Patient plan of care discussed at interdisciplinary rounds: No    Anticipated Discharge Disposition:       Anticipated Discharge Services:    Anticipated Discharge DME:      Patient/family educated on Medicare website which has current facility and service quality ratings:    Education Provided on the Discharge Plan:    Patient/Family in Agreement with the Plan:      Referrals Placed by CM/SW:    [facility]  [facility]  [facility]     Private pay costs discussed: Not applicable    Additional Information:    Social Work asked to f/u on sending referrals that were identified yesterday with pt and her son. Writer has sent these referrals these morning.     Addendum:   1430:  [facility] and [facility] have declined pt for admissions. [facility] declined as they do not have a female bed available and do not anticipate one being open this week. [facility] declined but did not give reason.      No admissions at other of pt's preferred facilities; SW to f/u on Monday.          JUD EdenSW

## 2022-04-24 NOTE — PLAN OF CARE
"Goal Outcome Evaluation:      Observation Goals     - Adequate pain control on oral analgesia: met- back pain controlled with robaxin BID, and tylenol and oxycodone q4h PRN.     - Vital Signs normal or at patient baseline: Met    - Tolerating oral intake to maintain hydration: Met, tolerating consistent carb diet    - Diagnostic tests and consults completed and resulted: Not met, PT hasn't signed off yet    - Cleared for discharge from consultants' standpoint if involved in care: not met    - Safe disposition plan has been identified: Not met, per PT needs TCU        Patient received shower, skin care and had larger BM x 4. Per patient \" I emptied my stomach\"   /65 (BP Location: Right arm)   Pulse 62   Temp 98  F (36.7  C) (Oral)   Resp 16   Ht 1.651 m (5' 5\")   Wt 89.8 kg (198 lb)   LMP  (LMP Unknown)   SpO2 98%   BMI 32.95 kg/m             "

## 2022-04-24 NOTE — PROGRESS NOTES
Patient interviewed and examined. Labs, imaging and chart notes reviewed.  Tonya Yang presented to the ED after a fall onto her buttocks at home while getting towels out of her linen closet. She has a history of pneumonia with sepsis in March of this year, chronic back pain and spinal stenosis. She was evaluated by physical therapy and TCU placement has been recommended. She was at [facility] for several weeks after her March hospitalization and was admitted to [facility] after a fall at the nursing home. She was discharged to another NH in  [place], but was not satisfied with the care and asked her son to take her home after several hours there. She had been at home for about 1 week prior to the most recent fall. She has a son who live in [place] [facility] and a Niece in [place] whop will be visiting in about 2 weeks. She is resistant to nursing home placement.We discussed that if she returns home she will need additional help for safety and that this would likely be at her and family expense other than PT and nurse visits. We discussed that she appears capable of independent decision making.    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     Hepatic abscess      Hyperlipidemia      Hypertension      Irritable bowel syndrome      Meniere disease      Neuropathy      Noninfectious ileitis      Peritoneal abscess (H)      Spinal stenosis of lumbar region      Steroid long-term use      Vaginitis, atrophic, postmenopausal      Vitamin D deficiency        Past Surgical History:   Procedure Laterality Date     CATARACT IOL, RT/LT Left 7/2013     FOOT  "SURGERY      toe     GI SURGERY       IR LUMBAR/SACRAL TRANSFOR INJ BILATERAL Bilateral 3/11/2022     LAPAROSCOPIC HEPATECTOMY PARTIAL  2013    Procedure: LAPAROSCOPIC HEPATECTOMY PARTIAL;  Laparoscopic Debridement of Liver Abcess;  Surgeon: Sepideh Green MD;  Location: [facility] OR     ORTHOPEDIC SURGERY       PICC INSERTION  2013    5fr DL Power PICC, 41cm (1cm external length) in the R lateral brachial vein with tip in the SVC RA junction.     RECTAL SURGERY      1970s     VASCULAR SURGERY         Family History   Problem Relation Age of Onset     Cerebrovascular Disease Mother      Heart Disease Mother      Heart Disease Father      Heart Disease Brother      Diabetes No family hx of      Coronary Artery Disease No family hx of      Hypertension No family hx of      Hyperlipidemia No family hx of      Breast Cancer No family hx of      Colon Cancer No family hx of      Prostate Cancer No family hx of      Other Cancer No family hx of      Depression No family hx of      Anxiety Disorder No family hx of      Mental Illness No family hx of      Substance Abuse No family hx of      Anesthesia Reaction No family hx of      Asthma No family hx of      Osteoporosis No family hx of      Genetic Disorder No family hx of      Thyroid Disease No family hx of      Obesity No family hx of      Unknown/Adopted No family hx of        Social History     Tobacco Use     Smoking status: Former Smoker     Types: Cigarettes     Quit date: 11/15/1987     Years since quittin.4     Smokeless tobacco: Former User   Substance Use Topics     Alcohol use: Not Currently     Alcohol/week: 0.0 standard drinks     Comment: MALISSA -paco since      Physical Exam:  Elderly female [place], in NAD.  /65 (BP Location: Right arm)   Pulse 62   Temp 98  F (36.7  C) (Oral)   Resp 16   Ht 1.651 m (5' 5\")   Wt 89.8 kg (198 lb)   LMP  (LMP Unknown)   SpO2 98%   BMI 32.95 kg/m  '  HEENT: SHRUTHI. EOMI. Face " symmetric.  Neck: No nruit.'Lungs: Clear.  Cardiac: RRR. Normal S1 and S2.  Abdomen: Soft.  Extrem: Trace edema.  Neuro: CN, speech, coordination intect.  Psych: Normal mood and affect.    Results for orders placed or performed during the hospital encounter of 04/22/22 (from the past 48 hour(s))   Glucose by meter   Result Value Ref Range    GLUCOSE BY METER POCT 202 (H) 70 - 99 mg/dL   Glucose by meter   Result Value Ref Range    GLUCOSE BY METER POCT 195 (H) 70 - 99 mg/dL   Glucose by meter   Result Value Ref Range    GLUCOSE BY METER POCT 234 (H) 70 - 99 mg/dL   Glucose by meter   Result Value Ref Range    GLUCOSE BY METER POCT 103 (H) 70 - 99 mg/dL   CBC with platelets   Result Value Ref Range    WBC Count 7.5 4.0 - 11.0 10e3/uL    RBC Count 4.37 3.80 - 5.20 10e6/uL    Hemoglobin 10.0 (L) 11.7 - 15.7 g/dL    Hematocrit 37.0 35.0 - 47.0 %    MCV 85 78 - 100 fL    MCH 22.9 (L) 26.5 - 33.0 pg    MCHC 27.0 (L) 31.5 - 36.5 g/dL    RDW 18.7 (H) 10.0 - 15.0 %    Platelet Count 348 150 - 450 10e3/uL   Basic metabolic panel   Result Value Ref Range    Sodium 140 133 - 144 mmol/L    Potassium 3.2 (L) 3.4 - 5.3 mmol/L    Chloride 103 94 - 109 mmol/L    Carbon Dioxide (CO2) 32 20 - 32 mmol/L    Anion Gap 5 3 - 14 mmol/L    Urea Nitrogen 20 7 - 30 mg/dL    Creatinine 0.86 0.52 - 1.04 mg/dL    Calcium 10.1 8.5 - 10.1 mg/dL    Glucose 124 (H) 70 - 99 mg/dL    GFR Estimate 68 >60 mL/min/1.73m2   Magnesium   Result Value Ref Range    Magnesium 2.0 1.6 - 2.3 mg/dL   Phosphorus   Result Value Ref Range    Phosphorus 2.9 2.5 - 4.5 mg/dL   Glucose by meter   Result Value Ref Range    GLUCOSE BY METER POCT 161 (H) 70 - 99 mg/dL   Glucose by meter   Result Value Ref Range    GLUCOSE BY METER POCT 221 (H) 70 - 99 mg/dL   Potassium   Result Value Ref Range    Potassium 4.1 3.4 - 5.3 mmol/L   Glucose by meter   Result Value Ref Range    GLUCOSE BY METER POCT 141 (H) 70 - 99 mg/dL   Glucose by meter   Result Value Ref Range    GLUCOSE BY  METER POCT 119 (H) 70 - 99 mg/dL   Glucose by meter   Result Value Ref Range    GLUCOSE BY METER POCT 78 70 - 99 mg/dL   Glucose by meter   Result Value Ref Range    GLUCOSE BY METER POCT 125 (H) 70 - 99 mg/dL     Impression:  Elderly woman with a history of spinal stenosis and chronic LE weakness and chronic back pain worsened after hospital admission for pneumonia and sepsis in March of this year. She presents after a fall at home after checking herself out of her second NH in the past 2 months. She is resistant to NH placement.  in process. She does appear capable of independent decisions. Awaiting satidfactory disposition either to TCU or back home with additional services.    Papi Gagnon MD

## 2022-04-24 NOTE — UTILIZATION REVIEW
"  Admission Status; Secondary Review Determination         Under the authority of the Utilization Management Committee, the utilization review process indicated a secondary review on the above patient.  The review outcome is based on review of the medical records, discussions with staff, and applying clinical experience noted on the date of the review.       (x) Observation Status Appropriate - This patient does not meet hospital inpatient criteria and is placed in observation status. If this patient's primary payer is Medicare and was admitted as an inpatient, Condition Code 44 should be used and patient status changed to \"observation\".       RATIONALE FOR DETERMINATION   The patient is a 81-year-old female admitted on 4/22/2022.  Patient came into the hospital with lower extremity weakness and chronic back pain.  She had been discharged recently from [facility] and was in a TCU and because she did not have her request for assistance answered in a timely basis, she checked herself out AMA.  This happened at a previous nursing home.  Patient does have diabetes but after evaluation on this admission there does not appear to be any new medical indication requiring acute hospital management.  The patient likely will be discharged tomorrow.  Based on current data in the chart, recommend continuation of observation status.      The severity of illness, intensity of service provided, expected LOS and risk for adverse outcome make the care complex, high risk and appropriate for hospital admission.        The information on this document is developed by the utilization review team in order for the business office to ensure compliance.  This only denotes the appropriateness of proper admission status and does not reflect the quality of care rendered.         The definitions of Inpatient Status and Observation Status used in making the determination above are those provided in the CMS Coverage Manual, Chapter 1 and " Chapter 6, section 70.4.      Sincerely,     Ashish Wayne MD  Physician Advisor  Utilization Review/ Case Management  [facility].

## 2022-04-24 NOTE — PLAN OF CARE
Goal Outcome Evaluation:     - Adequate pain control on oral analgesia: met- back pain controlled with robaxin BID, and tylenol and oxycodone q4h PRN.     - Vital Signs normal or at patient baseline: Met    - Tolerating oral intake to maintain hydration: Met, tolerating consistent carb diet    - Diagnostic tests and consults completed and resulted: Not met, PT hasn't signed off yet    - Cleared for discharge from consultants' standpoint if involved in care: not met    - Safe disposition plan has been identified: Not met, per PT needs TCU     - Return to baseline functional status: Not met         Status: admitted to obs 4/22 after fall at home.   Vitals: VSS on RA  Neuros: A&Ox4, forgetful. Numbness in feet baseline  IV: PIV SL'd  Labs/Electrolytes: K+ 4.1 after replacement.   Diet: consistent carb diet. Had 100% of dinner  Bowel status: 2 BM today  : voids  Skin: c/o itching from adhesives. Rash on arms from tape.   Pain: chronic spinal stenosis. Robaxin, oxycodone, and tylenol PRN  Activity: assist of 1-2 pivot to commode. Bed alarm on for safety  Social: son visited this evening  Plan: waiting for TCU placement

## 2022-04-24 NOTE — PHARMACY-ADMISSION MEDICATION HISTORY
"  Admission Medication History Completed by Pharmacy    See [facility] Admission Navigator for allergy information, preferred outpatient pharmacy, prior to admission medications and immunization status.     Medication History Sources:   Dispense Report, Chart Review, Discharge Summary from 4/15/22 from [facility]    Changes made to PTA medication list (reason):    Added: Gabapentin, hydroxyzine , Percocet 5-325, triamterene-hydrochlorothiazide 37.5-25 mg     Deleted: furosemide 40 mg (duplicate, discharge instructions from [facility] state to resume home regimen which is 20 mg daily)    Changed: None    Additional Information:    Unable to meet with patient for interview, has declined interview with PDP intern x2. 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 Communicates via lip reading and loud talking which is limited by mask wearing.     Patient discharged from [facility] on 4/15 with instructions to start taking: diclofenac gel, ferrous sulfate (for iron deficiency anemia), Novolog 100 Unit/mL pen (NO fill history, appears from notes patient adamantly refuses to self-inject insulin), melatonin, miconazole 2% powder (no fill history), oxycodone 7.5 mg #10 (NOT filled by patient as she already has a prescription outpatient for Percocet), polyethylene glycol, pregabalin (filled 4/15/22 #30), senna-docusate. Unable to confirm OTC orders at this time as no fill history available.      data:  o Oxycodone-acetaminophen 5-325 mg #120 for 30 day supplies filled monthly, prescriber is PCP Ananya Mclean or Osmin Carty (last filled 4/19/2022)  o Pregabalin 25 mg capsule #10 filled 4/15/22, prescribed by Dr. Sutton ([facility])  o Gabapentin 100 mg capsule filled 4/22/22 #360, prescriber Caitlin Silverman MD (this most recent fill NOT on  but reported via [facility])  o Diazepam 5 mg/5mL solution last filled 3/15/22, #28mL, prescriber Osmin Carty "     Patient seen earlier this month by WALT on 4/4 at [facility] where it was noted she took triamterene-hydrochlorothiazide and prednisone for Ménière's disease. Triamterene-HCTZ was discontinued (and not re-ordered here) by OSH (4/7-4/15), however is still being filled by patient's pharmacy as prescribed by her PCP.     Recommendations:   o Clarify with PCP if patient should be on triamterene-hydrochlorothiazide for Ménière's disease to reduce fluid retention vs discontinued as done at outside hospital  o Establish if patient requires carvedilol for blood pressure control as this has not been filled by patients pharmacy  o Encourage patient to establish with [facility] pain pharmacist (consult placed during last hospitalization)  o Clarify if patient is taking gabapentin or pregabalin (Lyrica) for pain as these represent duplicate therapies   o Discontinue oxycodone 7.5 mg order    Prior to Admission medications    Medication Sig Last Dose Taking? Auth Provider   atorvastatin (LIPITOR) 10 MG tablet  Last filled 2/4/22 #90 Take 10 mg by mouth At Bedtime  Yes Unknown, Entered By History   carvedilol (COREG) 3.125 MG tablet  No fill history found Take 1 tablet (3.125 mg) by mouth 2 times daily (with meals)  Yes Hayder Mishra MD   furosemide (LASIX) 20 MG tablet  Last filled 4/15/22 #30 Take 1 tablet (20 mg) by mouth every evening 2 pm  Yes Rebecca Sutton MD   gabapentin (NEURONTIN) 100 MG capsule  Last filled 4/22/22 #360   Take 100 mg by mouth 4 times daily  Yes Unknown, Entered By History   glipiZIDE (GLUCOTROL) 10 MG tablet  Last filled 3/21/22 #360 Take 1 tablet (10 mg) by mouth 2 times daily (before meals)  Yes Hayder Mishra MD   hydrOXYzine (ATARAX) 25 MG tablet  Last filled 4/18/22 #30 Take 25 mg by mouth 3 times daily as needed for anxiety  Yes Unknown, Entered By History   lamoTRIgine (LAMICTAL) 100 MG tablet  Last filled 4/18/22 #180   Take 100 mg by mouth 2 times daily  Yes Unknown, Entered By History    lisinopril (ZESTRIL) 5 MG tablet  Last filled as 10 mg tabs on 11/27/22 #90 Take 5 mg by mouth daily  Yes Osmin Omalley APRN CNP   metFORMIN (GLUCOPHAGE) 500 MG tablet  Last filled 2/3/22 #135 with directions to take 1 tab AM, 1.5 tabs PM   Take 500 mg by mouth 2 times daily (with meals)  Yes Unknown, Entered By History   methocarbamol (ROBAXIN) 500 MG tablet  Last filled 4/18/22 #60  Take 1 tablet (500 mg) by mouth every 12 hours as needed for muscle spasms  Yes Rebecca Sutton MD   mirabegron (MYRBETRIQ) 25 MG 24 hr tablet  NO fill history Take 25 mg by mouth At Bedtime   Yes Osmin Omalley APRN CNP          oxyCODONE 7.5 MG TABS  NO fill history Take 7.5 mg by mouth every 6 hours as needed for moderate to severe pain  Yes Rebecca Sutton MD   oxyCODONE-acetaminophen (PERCOCET) 5-325 MG tablet  Last filled 4/19/22 #120   Take 1 tablet by mouth every 6 hours as needed for severe pain (Max 4 tablets daily)  Yes Unknown, Entered By History   triamterene-HCTZ (MAXZIDE-25) 37.5-25 MG tablet  Last filled 4/18/22 #90   TAKE 1 CAPSULE BY MOUTH EVERY DAY INSTEAD OF CHLORTHALIDONE  Yes Unknown, Entered By History   acetaminophen (TYLENOL) 325 MG tablet Take 1-2 tablets (325-650 mg) by mouth every 6 hours as needed for mild pain   Venita Cano APRN CNP   blood glucose (ACCU-CHEK FASTCLIX) lancing device FOR TESTING ONCE DAILY. DX  E11.9 TYPE 2 DIABETES   Reported, Patient   blood glucose (CONTOUR TEST) test strip TESTING EVERY DAY DX  E11.9   Reported, Patient   CONTOUR NEXT TEST test strip TESTING EVERY DAY DX  E11.9   Reported, Patient   cyanocobalamin 1000 MCG SUBL Place 1,000 mcg under the tongue daily   Unknown, Entered By History   diclofenac (VOLTAREN) 1 % topical gel Apply 4 g topically 4 times daily   Rebecca Sutton MD   DULoxetine (CYMBALTA) 60 MG EC capsule  Last filled 2/18/22 #90   TAKE 1 CAPSULE (60 MG) BY MOUTH DAILY   Bethanie Finnegan MD   ferrous sulfate (FEROSUL)  325 (65 Fe) MG tablet Take 1 tablet (325 mg) by mouth daily   Ml López MD   insulin aspart (NOVOLOG PEN) 100 UNIT/ML pen  NO FILL HISTORY Correction Scale - MEDIUM INSULIN RESISTANCE DOSING     Do Not give Correction Insulin if Pre-Meal BG less than 140.   For Pre-Meal  - 189 give 1 unit.   For Pre-Meal  - 239 give 2 units.   For Pre-Meal  - 289 give 3 units.   For Pre-Meal  - 339 give 4 units.   For Pre-Meal - 399 give 5 units.   For Pre-Meal -449 give 6 units  For Pre-Meal BG greater than or equal to 450 give 7 units.   To be given with prandial insulin, and based on pre-meal blood glucose.    Notify provider if glucose greater than or equal to 350 mg/dL after administration of correction dose.   Ml López MD   JARDIANCE 25 MG TABS tablet  Last filled 4/18/22 #90   Take 25 mg by mouth daily    Reported, Patient   melatonin 1 MG TABS tablet Take 1 tablet (1 mg) by mouth nightly as needed for sleep   Ml López MD   miconazole (MICATIN) 2 % external powder Apply topically 2 times daily To the skin fold   Ml López MD   mineral oil-hydrophilic petrolatum (AQUAPHOR) external ointment Apply topically 3 times daily To the dry skin   Ml López MD   omeprazole (PRILOSEC) 20 MG DR capsule Take 20 mg by mouth daily    Unknown, Entered By History   order for DME Equipment being ordered: wrist brace   Leah Feliz PA-C   polyethylene glycol (MIRALAX) 17 GM/Dose powder Take 17 g by mouth daily   Rebecca Sutton MD   predniSONE (DELTASONE) 1 MG tablet  Last filled 3/11/22 #90   Take 6 mg by mouth daily    Unknown, Entered By History   pregabalin (LYRICA) 25 MG capsule  Last filled 4/15/22 #30 Take 1 capsule (25 mg) by mouth At Bedtime   Rebecca Sutton MD   senna-docusate (SENOKOT-S/PERICOLACE) 8.6-50 MG tablet Take 1 tablet by mouth 2 times daily   Rebecca Sutton MD   triamcinolone (KENALOG) 0.1 % external cream  Last filled 1/7/22 #30   Apply topically  2 times daily as needed    Reported, Patient   vitamin D3 (CHOLECALCIFEROL) 50 mcg (2000 units) tablet Take 3 tablets by mouth daily   Unknown, Entered By History                  Date completed: 04/24/22    Medication history completed by: Kristina Nguyễn, ClaudiaD

## 2022-04-24 NOTE — PLAN OF CARE
"Observation Goals            - Adequate pain control on oral analgesia: met- back pain controlled with robaxin BID, and tylenol and oxycodone q4h PRN.     - Vital Signs normal or at patient baseline: Met    - Tolerating oral intake to maintain hydration: Met, tolerating consistent carb diet    - Diagnostic tests and consults completed and resulted: Not met, PT hasn't signed off yet    - Cleared for discharge from consultants' standpoint if involved in care: not met    - Safe disposition plan has been identified: Not met, per PT needs TCU     - Return to baseline functional status: Not met     /65 (BP Location: Right arm)   Pulse 62   Temp 98  F (36.7  C) (Oral)   Resp 16   Ht 1.651 m (5' 5\")   Wt 89.8 kg (198 lb)   LMP  (LMP Unknown)   SpO2 98%   BMI 32.95 kg/m      "

## 2022-04-25 VITALS
BODY MASS INDEX: 32.99 KG/M2 | HEIGHT: 65 IN | RESPIRATION RATE: 18 BRPM | DIASTOLIC BLOOD PRESSURE: 74 MMHG | HEART RATE: 84 BPM | WEIGHT: 198 LBS | SYSTOLIC BLOOD PRESSURE: 112 MMHG | TEMPERATURE: 98.2 F | OXYGEN SATURATION: 97 %

## 2022-04-25 LAB
GLUCOSE BLDC GLUCOMTR-MCNC: 276 MG/DL (ref 70–99)
GLUCOSE BLDC GLUCOMTR-MCNC: 94 MG/DL (ref 70–99)

## 2022-04-25 PROCEDURE — 250N000013 HC RX MED GY IP 250 OP 250 PS 637: Performed by: PHYSICIAN ASSISTANT

## 2022-04-25 PROCEDURE — 99217 PR OBSERVATION CARE DISCHARGE: CPT | Performed by: NURSE PRACTITIONER

## 2022-04-25 PROCEDURE — 96372 THER/PROPH/DIAG INJ SC/IM: CPT

## 2022-04-25 PROCEDURE — 250N000012 HC RX MED GY IP 250 OP 636 PS 637: Performed by: NURSE PRACTITIONER

## 2022-04-25 PROCEDURE — 250N000013 HC RX MED GY IP 250 OP 250 PS 637: Performed by: NURSE PRACTITIONER

## 2022-04-25 PROCEDURE — G0378 HOSPITAL OBSERVATION PER HR: HCPCS

## 2022-04-25 PROCEDURE — 82962 GLUCOSE BLOOD TEST: CPT

## 2022-04-25 RX ADMIN — OXYCODONE HYDROCHLORIDE 5 MG: 5 TABLET ORAL at 08:29

## 2022-04-25 RX ADMIN — EMPAGLIFLOZIN 25 MG: 25 TABLET, FILM COATED ORAL at 08:09

## 2022-04-25 RX ADMIN — DULOXETINE HYDROCHLORIDE 60 MG: 60 CAPSULE, DELAYED RELEASE PELLETS ORAL at 08:07

## 2022-04-25 RX ADMIN — SENNOSIDES AND DOCUSATE SODIUM 2 TABLET: 8.6; 5 TABLET ORAL at 08:08

## 2022-04-25 RX ADMIN — METFORMIN HYDROCHLORIDE 500 MG: 500 TABLET, FILM COATED ORAL at 08:07

## 2022-04-25 RX ADMIN — OXYCODONE HYDROCHLORIDE 5 MG: 5 TABLET ORAL at 04:36

## 2022-04-25 RX ADMIN — GLIPIZIDE 10 MG: 10 TABLET ORAL at 08:08

## 2022-04-25 RX ADMIN — LAMOTRIGINE 100 MG: 100 TABLET ORAL at 08:10

## 2022-04-25 RX ADMIN — OXYCODONE HYDROCHLORIDE 5 MG: 5 TABLET ORAL at 12:29

## 2022-04-25 RX ADMIN — INSULIN ASPART 3 UNITS: 100 INJECTION, SOLUTION INTRAVENOUS; SUBCUTANEOUS at 12:25

## 2022-04-25 RX ADMIN — HYDROXYZINE HYDROCHLORIDE 25 MG: 25 TABLET, FILM COATED ORAL at 08:08

## 2022-04-25 RX ADMIN — PREDNISONE 6 MG: 1 TABLET ORAL at 08:07

## 2022-04-25 RX ADMIN — NYSTATIN: 100000 CREAM TOPICAL at 08:30

## 2022-04-25 RX ADMIN — Medication 325 MG: at 08:07

## 2022-04-25 RX ADMIN — LISINOPRIL 5 MG: 5 TABLET ORAL at 08:08

## 2022-04-25 RX ADMIN — POTASSIUM CHLORIDE 20 MEQ: 750 TABLET, EXTENDED RELEASE ORAL at 08:07

## 2022-04-25 RX ADMIN — METHOCARBAMOL 500 MG: 500 TABLET ORAL at 08:08

## 2022-04-25 RX ADMIN — PANTOPRAZOLE SODIUM 40 MG: 40 TABLET, DELAYED RELEASE ORAL at 08:08

## 2022-04-25 RX ADMIN — LIDOCAINE 1 PATCH: 246 PATCH TOPICAL at 08:14

## 2022-04-25 RX ADMIN — CARVEDILOL 3.12 MG: 3.12 TABLET, FILM COATED ORAL at 08:42

## 2022-04-25 RX ADMIN — Medication 1000 MCG: at 08:43

## 2022-04-25 NOTE — PROGRESS NOTES
Care Management Discharge Note    Discharge Date: 04/25/2022       Discharge Disposition: Home, Home Care, DME    Discharge Services: PCA    Discharge DME: Wheelchair ((PCP is working on arranging))    Discharge Transportation: family or friend will provide    Private pay costs discussed: Not applicable    PAS Confirmation Code:  (N/A)  Patient/family educated on Medicare website which has current facility and service quality ratings: yes    Education Provided on the Discharge Plan:    Persons Notified of Discharge Plans: RN, NP, Pt, Davie  Patient/Family in Agreement with the Plan: yes    Handoff Referral Completed: No    Additional Information:  Writer spoke with pt's son, Davie, (P: [phone]) who reported he wanted to tour the TCU. Davie reported if he is unable to convince his mother of going to the TCU, he would take her home. Davie asked how HHC is arranged, which writer answered. Davie is working with pt's PCP to order a w/c through [facility]. Davie reports he will call pt to try to convince her.    Writer spoke with pt, pt reports she wants to return home. Discussed conversation writer had with Davie, and how he is touring a facility that did accept her. Pt appeared apprehensive and wanted to talk to her son, but reported she would prefer to return home over a TCU.    Writer spoke with [employer] RN PATRICIA Petty (P: [phone]). P[facility]ila is working on trying to find a PCA agency for pt, but has not had much success. Pt has an RN through [employer] (P: [phone]; F: [phone]). Writer confirmed discharge plan with [employer].    Writer received update from Davie, who reports that [facility] will be able to staff pt with a PCA 3x/week. Davie expresses agreement with PCA services and to have pt return home. Davie also reports [facility] can provide the PT services if they receive orders. Davie reports he will transport pt home around 1500 today.    Writer left VM with intake at [facility] (P: [phone]; F:  "[phone]). Writer received call from Diamond, who confirmed that services that will be in place at discharge. Services for RN/PT to start Wednesday. Writer faxed orders    Davie reports that he is working with pt's PCP to get pt a w/c and inquired if there was a \"loaner\" w/c that could be provided to pt. Writer explained there was not and encouraged Davie to continue working with pt's PCP. Writer informed Davie of the senior nutrition resources that were placed in pt's AVS and to follow up with then.    Due PCA services being in place. All SNF referrals are being discontinued:     [facility]  [address] [place]  [address]  P: [phone]  P: [phone] - Admissions  F: [phone]     [facility]  [address]  [address]  P: [phone]  F: [phone]     [facility]   [address] [place]  [address]  P: [phone]  P: [phone] - Admissions  F: [phone]  - Writer received VM from [facility] stating they had no beds available. Writer has ceased following this referral.     [facility]  [address] [place]  [address]  P: [phone]  P: [phone] - Admissions  F: [phone]     [facility]  [address] [place], [place]  [address]  P: [phone]  P: [phone] - Admissions  F: [phone]  - Writer received VM from [facility] stating they could accept pt today.  - Writer LVM with [facility].   - Writer LVM with [facility] discontinuing the referral. Writer has ceased following this referral.        [address] [place]  [address]  P: [phone]  P: [phone] - Admissions  F: [phone]     [facility]  P: [phone]  P: [phone] - Admissions  F: [phone]    The following facilities have either declined pt or lack bed availability:    [facility]  [address]  [address]  P: [phone]  P: [phone] - Admissions  F: [phone]    [facility]  [address] [place] [address]  [address]  P: [phone]  P: [phone] - Admissions  F: [phone]    ADDENDUM 1622:  Writer received call from Diamond at [facility]. Diamond requested orders be faxed w/o inclusion of OT because they do not offer that service. Writer faxed updated orders.    ________________    OTILIA Craft, [facility]  ED/Observation   [facility]  Phone: [phone]  Pager: [phone]  Fax: [phone]    On-call pager, [phone], 4:00pm to midnight

## 2022-04-25 NOTE — PROGRESS NOTES
"S:patient with mechanical fall Friday wanting to go home but needs home equipment.  Patient gives hx of tcu x 2 unpleasant experience.   Patient son trying to get wheelchair at home.  Concerns of weakness and falling risk also.  Patient stable.  O:BP (!) 140/60 (BP Location: Right arm, Cuff Size: Adult Regular)   Pulse 82   Temp 98.5  F (36.9  C) (Oral)   Resp 20   Ht 1.651 m (5' 5\")   Wt 89.8 kg (198 lb)   LMP  (LMP Unknown)   SpO2 95%   BMI 32.95 kg/m    Patient hard of hearing stable.  Neuro intactl  CV rrr ab without guarding   Ext without edema.    Results for orders placed or performed during the hospital encounter of 04/22/22   XR Sacrum and Coccyx 2 Views     Status: None    Narrative    3 views pelvis radiograph(s) 4/22/2022 10:33 AM    History: fall, pain    Additional History from EMR: Fall in bathroom this morning with pain  over the tailbone.    Comparison: CT abdomen and pelvis 10/1/2013    Findings:    AP and lateral view(s) of the sacrum/sacroiliac joints were obtained.     There are overlying densities at the sacrococcygeal junction that  appears different from CT 2013, without definite evidence of fracture.  If there is high clinical suspicion for sacral/coccygeal fracture,  recommend CT with sagittal reconstructions to better delineate the  sacral and coccygeal bones.    Degenerative changes of the hips bilaterally, with marked joint space  narrowing of the left hip. Small radiodensities in the center of the  pelvic inlet, likely representing a uterine leiomyoma.    No erosion or ankylosis of either sacroiliac joints.      Sacrum is partially obscured by overlying bowel gas/fecal content.    Pelvic phleboliths.      Impression    Impression:  No definite evidence of fracture. See above if additional imaging is  warranted.    I have personally reviewed the examination and initial interpretation  and I agree with the findings.    LYNN MACIEL MD         SYSTEM ID:  [id number]   XR Pelvis 1/2 " Views     Status: None    Narrative    1 views pelvis radiograph(s) 4/22/2022 10:23 AM    History: fall, pain    Additional History from EMR: Patient fell today in her bathroom and  landed on her buttocks. Currently having pain in the tailbone area.    Comparison: CT abdomen and pelvis 10/1/2013.    Findings:    1 view(s) of the pelvis were obtained.     No acute osseous abnormality.    Degenerative changes of the hips bilaterally with marked joint space  narrowing of the left hip. Degenerative changes of the lower lumbar  spine.    Small radiodensities project over the iliac bones bilaterally,  presumably soft tissue calcifications or granulomas.    Within the pelvic inlet, there is a small radiodensity within the  center of the soft tissue, presumably a uterine leiomyoma.    Sacrum and innominate bones are partially obscured by overlying bowel  gas/fecal content.    Pelvic phlebolith.      Impression    Impression:  1. No acute osseous abnormality.  2. Degenerative changes of the hips and lower lumbar spine.    I have personally reviewed the examination and initial interpretation  and I agree with the findings.    LYNN MACIEL MD         SYSTEM ID:  [id number]   Abd/pelvis CT no contrast - [place] Protocol     Status: None    Narrative    EXAMINATION: CT ABDOMEN PELVIS W/O CONTRAST, 4/22/2022 1:01 PM    TECHNIQUE:  Helical CT images from the lung bases through the  symphysis pubis were obtained without IV contrast.     COMPARISON: 10/1/2013    HISTORY: Trauma - Abdominal/Pelvic Injury; Please evaluate for  sacral/coccygeal fractures    FINDINGS:  Unenhanced liver, gallbladder, bile ducts, pancreas, spleen, adrenal  glands are unremarkable. Multiple renal cysts bilaterally. Distended  mildly trabeculated urinary bladder. Calcified fibroid at the uterine  fundus. Sigmoid prominent colonic diverticulosis without acute  inflammation. Appendix is normal. No dilated bowel. No free fluid or  free air. No adenopathy.  Aortoiliac atherosclerotic calcifications  without aneurysm.    Lower thorax:  Atelectasis in the lung bases.    Bones and soft tissues:  Small fat-containing periumbilical hernia. Multifocal areas of fat  necrosis in the simultaneous fat overlying the pelvis and lower  abdomen posteriorly. No acute or suspicious osseous lesion. No acute  fracture of the sacrum or coccyx. Diffusely demineralized appearing  bones.      Impression    IMPRESSION:  No acute finding.    I have personally reviewed the examination and initial interpretation  and I agree with the findings.    SO BUSTILLOS MD         SYSTEM ID:  [id number]   [place] Draw     Status: None    Narrative    The following orders were created for panel order [place] Draw.  Procedure                               Abnormality         Status                     ---------                               -----------         ------                     Extra Blue Top Tube[[id number]]                              Final result               Extra Red Top Tube[[id number]]                               Final result               Extra Green Top (Lithium...[[id number]]                      Final result               Extra Purple Top Tube[[id number]]                            Final result                 Please view results for these tests on the individual orders.   Troponin I     Status: Normal   Result Value Ref Range    Troponin I High Sensitivity 7 <54 ng/L   Comprehensive metabolic panel     Status: Abnormal   Result Value Ref Range    Sodium 137 133 - 144 mmol/L    Potassium 3.3 (L) 3.4 - 5.3 mmol/L    Chloride 100 94 - 109 mmol/L    Carbon Dioxide (CO2) 29 20 - 32 mmol/L    Anion Gap 8 3 - 14 mmol/L    Urea Nitrogen 30 7 - 30 mg/dL    Creatinine 1.14 (H) 0.52 - 1.04 mg/dL    Calcium 9.6 8.5 - 10.1 mg/dL    Glucose 201 (H) 70 - 99 mg/dL    Alkaline Phosphatase 88 40 - 150 U/L    AST 16 0 - 45 U/L    ALT 28 0 - 50 U/L    Protein Total 6.9 6.8 - 8.8 g/dL    Albumin 3.4 3.4  - 5.0 g/dL    Bilirubin Total 0.4 0.2 - 1.3 mg/dL    GFR Estimate 49 (L) >60 mL/min/1.73m2   UA with Microscopic reflex to Culture     Status: Abnormal    Specimen: Urine, Clean Catch   Result Value Ref Range    Color Urine Light Yellow Colorless, Straw, Light Yellow, Yellow    Appearance Urine Clear Clear    Glucose Urine >=1000 (A) Negative mg/dL    Bilirubin Urine Negative Negative    Ketones Urine Negative Negative mg/dL    Specific Gravity Urine 1.023 1.003 - 1.035    Blood Urine Negative Negative    pH Urine 5.0 5.0 - 7.0    Protein Albumin Urine Negative Negative mg/dL    Urobilinogen Urine Normal Normal, 2.0 mg/dL    Nitrite Urine Negative Negative    Leukocyte Esterase Urine Negative Negative    Bacteria Urine Few (A) None Seen /HPF    RBC Urine <1 <=2 /HPF    WBC Urine 4 <=5 /HPF    Squamous Epithelials Urine 1 <=1 /HPF    Transitional Epithelials Urine <1 <=1 /HPF    Narrative    Urine Culture not indicated   CK total     Status: Normal   Result Value Ref Range     30 - 225 U/L   Extra Blue Top Tube     Status: None   Result Value Ref Range    Hold Specimen JIC    Extra Red Top Tube     Status: None   Result Value Ref Range    Hold Specimen JIC    Extra Green Top (Lithium Heparin) Tube     Status: None   Result Value Ref Range    Hold Specimen JIC    Extra Purple Top Tube     Status: None   Result Value Ref Range    Hold Specimen JIC    CBC with platelets and differential     Status: Abnormal   Result Value Ref Range    WBC Count 8.6 4.0 - 11.0 10e3/uL    RBC Count 4.28 3.80 - 5.20 10e6/uL    Hemoglobin 9.9 (L) 11.7 - 15.7 g/dL    Hematocrit 36.3 35.0 - 47.0 %    MCV 85 78 - 100 fL    MCH 23.1 (L) 26.5 - 33.0 pg    MCHC 27.3 (L) 31.5 - 36.5 g/dL    RDW 18.8 (H) 10.0 - 15.0 %    Platelet Count 334 150 - 450 10e3/uL    % Neutrophils 59 %    % Lymphocytes 26 %    % Monocytes 8 %    % Eosinophils 6 %    % Basophils 1 %    % Immature Granulocytes 0 %    NRBCs per 100 WBC 0 <1 /100    Absolute Neutrophils  5.1 1.6 - 8.3 10e3/uL    Absolute Lymphocytes 2.2 0.8 - 5.3 10e3/uL    Absolute Monocytes 0.7 0.0 - 1.3 10e3/uL    Absolute Eosinophils 0.5 0.0 - 0.7 10e3/uL    Absolute Basophils 0.1 0.0 - 0.2 10e3/uL    Absolute Immature Granulocytes 0.0 <=0.4 10e3/uL    Absolute NRBCs 0.0 10e3/uL   Asymptomatic COVID-19 Virus (Coronavirus) by PCR Nasopharyngeal     Status: Normal    Specimen: Nasopharyngeal; Swab   Result Value Ref Range    SARS CoV2 PCR Negative Negative, Testing sent to reference lab. Results will be returned via unsolicited result    Narrative    Testing was performed using the [employer] Xpress SARS-CoV-2 Assay on the  [employer]. Additional information about  this Emergency Use Authorization (EUA) assay can be found via the Lab  Guide. This test should be ordered for the detection of SARS-CoV-2 in  individuals who meet SARS-CoV-2 clinical and/or epidemiological  criteria. Test performance is unknown in asymptomatic patients. This  test is for in vitro diagnostic use under the FDA EUA for  laboratories certified under CLIA to perform high complexity testing.  This test has not been FDA cleared or approved. A negative result  does not rule out the presence of PCR inhibitors in the specimen or  target RNA in concentration below the limit of detection for the  assay. The possibility of a false negative should be considered if  the patient's recent exposure or clinical presentation suggests  COVID-19. This test was validated by the [facility]. This laboratory is certified under  the Clinical Laboratory Improvement Amendments of 1988 (CLIA-88) as  qualified to perform high complexity laboratory testing.     Glucose by meter     Status: Abnormal   Result Value Ref Range    GLUCOSE BY METER POCT 202 (H) 70 - 99 mg/dL   Glucose by meter     Status: Abnormal   Result Value Ref Range    GLUCOSE BY METER POCT 195 (H) 70 - 99 mg/dL   CBC with  platelets     Status: Abnormal   Result Value Ref Range    WBC Count 7.5 4.0 - 11.0 10e3/uL    RBC Count 4.37 3.80 - 5.20 10e6/uL    Hemoglobin 10.0 (L) 11.7 - 15.7 g/dL    Hematocrit 37.0 35.0 - 47.0 %    MCV 85 78 - 100 fL    MCH 22.9 (L) 26.5 - 33.0 pg    MCHC 27.0 (L) 31.5 - 36.5 g/dL    RDW 18.7 (H) 10.0 - 15.0 %    Platelet Count 348 150 - 450 10e3/uL   Basic metabolic panel     Status: Abnormal   Result Value Ref Range    Sodium 140 133 - 144 mmol/L    Potassium 3.2 (L) 3.4 - 5.3 mmol/L    Chloride 103 94 - 109 mmol/L    Carbon Dioxide (CO2) 32 20 - 32 mmol/L    Anion Gap 5 3 - 14 mmol/L    Urea Nitrogen 20 7 - 30 mg/dL    Creatinine 0.86 0.52 - 1.04 mg/dL    Calcium 10.1 8.5 - 10.1 mg/dL    Glucose 124 (H) 70 - 99 mg/dL    GFR Estimate 68 >60 mL/min/1.73m2   Magnesium     Status: Normal   Result Value Ref Range    Magnesium 2.0 1.6 - 2.3 mg/dL   Phosphorus     Status: Normal   Result Value Ref Range    Phosphorus 2.9 2.5 - 4.5 mg/dL   Glucose by meter     Status: Abnormal   Result Value Ref Range    GLUCOSE BY METER POCT 234 (H) 70 - 99 mg/dL   Glucose by meter     Status: Abnormal   Result Value Ref Range    GLUCOSE BY METER POCT 103 (H) 70 - 99 mg/dL   Glucose by meter     Status: Abnormal   Result Value Ref Range    GLUCOSE BY METER POCT 161 (H) 70 - 99 mg/dL   Potassium     Status: Normal   Result Value Ref Range    Potassium 4.1 3.4 - 5.3 mmol/L   Glucose by meter     Status: Abnormal   Result Value Ref Range    GLUCOSE BY METER POCT 221 (H) 70 - 99 mg/dL   Glucose by meter     Status: Abnormal   Result Value Ref Range    GLUCOSE BY METER POCT 141 (H) 70 - 99 mg/dL   Glucose by meter     Status: Abnormal   Result Value Ref Range    GLUCOSE BY METER POCT 119 (H) 70 - 99 mg/dL   Glucose by meter     Status: Normal   Result Value Ref Range    GLUCOSE BY METER POCT 78 70 - 99 mg/dL   Glucose by meter     Status: Abnormal   Result Value Ref Range    GLUCOSE BY METER POCT 125 (H) 70 - 99 mg/dL   Glucose by  meter     Status: Abnormal   Result Value Ref Range    GLUCOSE BY METER POCT 102 (H) 70 - 99 mg/dL   Glucose by meter     Status: Abnormal   Result Value Ref Range    GLUCOSE BY METER POCT 134 (H) 70 - 99 mg/dL   Glucose by meter     Status: Normal   Result Value Ref Range    GLUCOSE BY METER POCT 94 70 - 99 mg/dL   EKG 12-lead     Status: None   Result Value Ref Range    Systolic Blood Pressure  mmHg    Diastolic Blood Pressure  mmHg    Ventricular Rate 78 BPM    Atrial Rate 78 BPM    MS Interval 146 ms    QRS Duration 152 ms     ms    QTc 572 ms    P Axis 35 degrees    R AXIS 15 degrees    T Axis 10 degrees    Interpretation ECG       Sinus rhythm  Right bundle branch block  Abnormal ECG  Unconfirmed report - interpretation of this ECG is computer generated - see medical record for final interpretation    Confirmed by - EMERGENCY ROOM, PHYSICIAN ([id number]),  RONALD MANNING ([id number]) on 4/22/2022 3:33:01 PM     CBC with platelets differential     Status: Abnormal    Narrative    The following orders were created for panel order CBC with platelets differential.  Procedure                               Abnormality         Status                     ---------                               -----------         ------                     CBC with platelets and d...[[id number]]  Abnormal            Final result                 Please view results for these tests on the individual orders.       A:Mechanical fall with fall risk underlying spinal stenosis  P:SW to see regarding home needs etc.      PT to see and further eval for ambulation and fall risk recommendations.     ? HHC and home PT? Per PT and SW rec.

## 2022-04-25 NOTE — PLAN OF CARE
Physical Therapy Discharge Summary    Reason for therapy discharge:    Discharged to home.    Progress towards therapy goal(s). See goals on Care Plan in [facility] electronic health record for goal details.  Goals not met.  Barriers to achieving goals:   discharge from facility.    Therapy recommendation(s):    Continued therapy is recommended.  Rationale/Recommendations:  Recommending TCU at time of discharge but patient declined and elected to discharge home.

## 2022-04-25 NOTE — PROGRESS NOTES
Observation Goals:    - Adequate pain control on oral analgesia - met  - Vital Signs normal or at patient baseline - met   - Tolerating oral intake to maintain hydration - met  - Diagnostic tests and consults completed and resulted - met  - Cleared for discharge from consultants' standpoint if involved in care - in progress, pt to go home with assistance from family (original recommendation TCU, however pt refused)  - Safe disposition plan has been identified - met, see above  - Return to baseline functional status - not met, pt is not at baseline mobility and needs assistance with transfers, positioning, etc

## 2022-04-25 NOTE — PROGRESS NOTES
All observation goals met.  Patient verbalized understanding of discharge instructions.  PIV removed.  Belongings with patient.  Patient wheeled to main lobby doors for ride home with family.

## 2022-04-25 NOTE — PROGRESS NOTES
Observation Goals:    - Adequate pain control on oral analgesia - met    - Vital Signs normal or at patient baseline - met     - Tolerating oral intake to maintain hydration - met    - Diagnostic tests and consults completed and resulted - met    - Cleared for discharge from consultants' standpoint if involved in care - in progress, pt to go home with home health care (original recommendation TCU, however pt refused)    - Safe disposition plan has been identified - met, see above    - Return to baseline functional status - not met, pt is not at baseline mobility and needs assistance with transfers, positioning, etc

## 2022-04-25 NOTE — PROGRESS NOTES
Observation goals:  - Adequate pain control on oral analgesia: met   - Vital Signs normal or at patient baseline: met   - Tolerating oral intake to maintain hydration: met   - Diagnostic tests and consults completed and resulted: met   - Cleared for discharge from consultants' standpoint if involved in care: not met  - Safe disposition plan has been identified: met, pt will discharge home with family, refusing TCU   - Return to baseline functional status: not met, will need assistance at discharge

## 2022-04-25 NOTE — DISCHARGE SUMMARY
[facility]  Hospitalist Discharge Summary      Date of Admission:  4/22/2022  Date of Discharge:  4/25/2022  Discharging Provider: JUAN ANTONIO Neville CNP  Discharge Service: Hospitalist Service    Discharge Diagnoses   Fall    Follow-ups Needed After Discharge   Follow-up Appointments     Adult [facility] Follow-up and recommended labs and tests      Follow up with primary care provider, Ananya Mclean, within   7 days for hospital follow- up.   Appointments on [place] and/or [facility] (with [facility] or [facility]   provider or service). Call [phone] if you haven't heard regarding   these appointments within 7 days of discharge.         {Additional follow-up instructions/to-do's for PCP    : Hospital follow up     Unresulted Labs Ordered in the Past 30 Days of this Admission     No orders found from 3/23/2022 to 4/23/2022.      These results will be followed up by PCP    Discharge Disposition   Discharged to home  Condition at discharge: Stable    Hospital Course   Tonya Yang is a 81 year old old female Past medical history significant for hypertension, diabetes mellitus type 2, neuropathy, polyarthralgia, chronic low back pain, severe spinal stenosis recently hospitalized at [facility] 3/7-3/13 for pneumonia, severe sepsis, acute on chronic low back pain,admitted to [facility] from 04/07/2022- 04/15/2022 for mechanical fall now presents with ED with complaints of a fall after leaving AMA from TCU on 4/15.      Assessment and Plan:     ## Fall  ## Coccyx pain  ## Spinal stenosis   ## Chronic lower extremity edema  ## Chronic low back pain  ## Disposition  Patient presents to the ED after a fall at home. Patient had been admitted after a fall to [facility] and was discharged to a TCU. The patient reports she spent 6 hours there and per patient no one had been in to see her for the 6 hours. She called her son and left AMA at midnight. She  reports since being home, she has had difficulty ambulating. Reports pain and edema in both feet. She notes neuropathy in both her feet. She has to shuffle to get around her apartment. She reports she went to the bathroom and noted she was out of toilet paper and towels. She went to the linen closet to grab a towel and lost her balance landing on her buttocks. There was no loss of consciousness or prodromal symptoms or head trauma.  She reports it took her 1 1/2 hours to crawl to her cell phone in her living room and call her son. She complained of pain near her tailbone and subsequently was brought into the ED.  Imaging was negative for any fractures.   She has chronic low back pain. Discussed her case with pain team as she has a pain contract and concerns of polypharmacy. Discussed with patient her goals of care. She would like to return home when the equipment her and her son had ordered is at her apartment, specifically a wheelchair. She does not wish to return to a TCU.  Ct abdomen/pelvis shows: Small fat-containing periumbilical hernia. Multifocal areas of fat necrosis in the simultaneous fat overlying the pelvis and lower abdomen posteriorly. No acute or suspicious osseous lesion. No acute fracture of the sacrum or coccyx. Diffusely demineralized appearing bones. She was evaluated by PT and was recommended to go to TCU for rehabilitation. Initially and the patient and her son were agreeable and TCU referral were sent. However today, patient son states that he plans to take the patient home. They will have home care and PCA services.   - Continue PTA Tylenol, Oxycodone, Robaxin, Voltaren 1% gel, Lyrica     ## PAULINE: Resolved    ## Constipation, resolved   - Miralax daily  - Senna S BID      ## Hypertension  - PTA Coreg 3.125 mg BID, Lisinopril 5 mg daily     ## Diabetes mellitus type 2  - PTA Metformin, Glipizide, Jardiance and MSSI     ## Overactive bladder:  - PTA Myrbetriq     ## Anemia  - PTA Ferrous sulfate       ## Bipolar   ## Depression  ## Anxiety  PTA Lamictal and Cymbalta     ## GERD  - PTA Protonix       Consultations This Hospital Stay   CARE MANAGEMENT / SOCIAL WORK IP CONSULT  PHYSICAL THERAPY ADULT IP CONSULT  PAIN MANAGEMENT ADULT IP CONSULT  MEDICATION HISTORY IP PHARMACY CONSULT    Code Status   Full Code    Time Spent on this Encounter   I, JUAN ANTONIO Neville CNP, personally saw the patient today and spent less than or equal to 30 minutes discharging this patient.       JUAN ANTONIO Neville CNP  [facility] [address] [address]  Phone: [phone]  Fax: [phone]  ______________________________________________________________________    Physical Exam   Vital Signs: Temp: 98.2  F (36.8  C) Temp src: Oral BP: 112/74 Pulse: 84   Resp: 18 SpO2: 97 % O2 Device: None (Room air)    Weight: 198 lbs 0 oz  Constitutional: Pt is oriented to person, place, and time.Pt appears well-developed and well-nourished.   HENT:   Head: Normocephalic and atraumatic.   Eyes: Conjunctivae are normal. Pupils are equal, round, and reactive to light.   Neck: Normal range of motion. Neck supple.   Cardiovascular: Normal rate, regular rhythm, normal heart sounds and intact distal pulses.    Pulmonary/Chest: Effort normal and breath sounds normal. No respiratory distress. Pt has no wheezes. Pt has no rales  Abdominal: Soft. Bowel sounds are normal. Pt exhibits no distension and no mass. No tenderness. Pt has no rebound and no guarding.   Musculoskeletal: Normal range of motion. Pt exhibits no edema.   Neurological: Pt is alert and oriented to person, place, and time. Normal reflexes.   Skin: Skin is warm and dry. No rash noted.   Psychiatric: Pt has a normal mood and affect. Behavior is normal. Judgment and thought content normal.           Primary Care Physician   Ananya Mclean    Discharge Orders      Home Care Referral      Reason for your  hospital stay    Fall     Activity    Your activity upon discharge: ambulate in house     Adult [facility] Follow-up and recommended labs and tests    Follow up with primary care provider, Ananya Mclean, within 7 days for hospital follow- up.   Appointments on [place] and/or [facility] (with [facility] or [facility] provider or service). Call [phone] if you haven't heard regarding these appointments within 7 days of discharge.     When to contact your care team    Call [phone] if any of these happen:     Trouble breathing    Confusion    Trouble waking up    Fainting or loss of consciousness    Fast or very slow heart rate    Seizure    Speech or vision problems    Arm or leg weakness    Trouble walking or talking, loss of balance, numbness or weakness in one side of your body, facial droop  When to get medical advice  Call your healthcare provider right away if any of these happen:    Another unexplained fall    Dizziness    Severe headache    Nausea and vomiting    Blood in vomit, stools (black or red color)     Discharge Instructions    [facility] you had a fall at [facility]. An xray and CT of your abdomen and pelvis were negative for fracture. Did show a small fat-containing periumbilical hernia. You were seen by physical therapy who recommended TCU. After discussion with you and your son, you would like to return home with home care. Recommend to continue your home medications and follow up with your primary care doctor early next week.     Diet    Follow this diet upon discharge: High carb diet       Significant Results and Procedures   Results for orders placed or performed during the hospital encounter of 04/22/22   XR Sacrum and Coccyx 2 Views    Narrative    3 views pelvis radiograph(s) 4/22/2022 10:33 AM    History: fall, pain    Additional History from EMR: Fall in bathroom this morning with pain  over the tailbone.    Comparison: CT abdomen and pelvis  10/1/2013    Findings:    AP and lateral view(s) of the sacrum/sacroiliac joints were obtained.     There are overlying densities at the sacrococcygeal junction that  appears different from CT 2013, without definite evidence of fracture.  If there is high clinical suspicion for sacral/coccygeal fracture,  recommend CT with sagittal reconstructions to better delineate the  sacral and coccygeal bones.    Degenerative changes of the hips bilaterally, with marked joint space  narrowing of the left hip. Small radiodensities in the center of the  pelvic inlet, likely representing a uterine leiomyoma.    No erosion or ankylosis of either sacroiliac joints.      Sacrum is partially obscured by overlying bowel gas/fecal content.    Pelvic phleboliths.      Impression    Impression:  No definite evidence of fracture. See above if additional imaging is  warranted.    I have personally reviewed the examination and initial interpretation  and I agree with the findings.    LYNN MACIEL MD         SYSTEM ID:  [id number]   XR Pelvis 1/2 Views    Narrative    1 views pelvis radiograph(s) 4/22/2022 10:23 AM    History: fall, pain    Additional History from EMR: Patient fell today in her bathroom and  landed on her buttocks. Currently having pain in the tailbone area.    Comparison: CT abdomen and pelvis 10/1/2013.    Findings:    1 view(s) of the pelvis were obtained.     No acute osseous abnormality.    Degenerative changes of the hips bilaterally with marked joint space  narrowing of the left hip. Degenerative changes of the lower lumbar  spine.    Small radiodensities project over the iliac bones bilaterally,  presumably soft tissue calcifications or granulomas.    Within the pelvic inlet, there is a small radiodensity within the  center of the soft tissue, presumably a uterine leiomyoma.    Sacrum and innominate bones are partially obscured by overlying bowel  gas/fecal content.    Pelvic phlebolith.      Impression     Impression:  1. No acute osseous abnormality.  2. Degenerative changes of the hips and lower lumbar spine.    I have personally reviewed the examination and initial interpretation  and I agree with the findings.    LYNN MACIEL MD         SYSTEM ID:  [id number]   Abd/pelvis CT no contrast - [place] Protocol    Narrative    EXAMINATION: CT ABDOMEN PELVIS W/O CONTRAST, 4/22/2022 1:01 PM    TECHNIQUE:  Helical CT images from the lung bases through the  symphysis pubis were obtained without IV contrast.     COMPARISON: 10/1/2013    HISTORY: Trauma - Abdominal/Pelvic Injury; Please evaluate for  sacral/coccygeal fractures    FINDINGS:  Unenhanced liver, gallbladder, bile ducts, pancreas, spleen, adrenal  glands are unremarkable. Multiple renal cysts bilaterally. Distended  mildly trabeculated urinary bladder. Calcified fibroid at the uterine  fundus. Sigmoid prominent colonic diverticulosis without acute  inflammation. Appendix is normal. No dilated bowel. No free fluid or  free air. No adenopathy. Aortoiliac atherosclerotic calcifications  without aneurysm.    Lower thorax:  Atelectasis in the lung bases.    Bones and soft tissues:  Small fat-containing periumbilical hernia. Multifocal areas of fat  necrosis in the simultaneous fat overlying the pelvis and lower  abdomen posteriorly. No acute or suspicious osseous lesion. No acute  fracture of the sacrum or coccyx. Diffusely demineralized appearing  bones.      Impression    IMPRESSION:  No acute finding.    I have personally reviewed the examination and initial interpretation  and I agree with the findings.    SO BUSTILLOS MD         SYSTEM ID:  [id number]       Discharge Medications   Current Discharge Medication List      CONTINUE these medications which have NOT CHANGED    Details   atorvastatin (LIPITOR) 10 MG tablet Take 10 mg by mouth At Bedtime      carvedilol (COREG) 3.125 MG tablet Take 1 tablet (3.125 mg) by mouth 2 times daily (with meals)    Associated  Diagnoses: Benign essential hypertension      furosemide (LASIX) 20 MG tablet Take 1 tablet (20 mg) by mouth every evening 2 pm  Qty: 30 tablet, Refills: 0    Associated Diagnoses: Bilateral leg edema      gabapentin (NEURONTIN) 100 MG capsule Take 100 mg by mouth 4 times daily      glipiZIDE (GLUCOTROL) 10 MG tablet Take 1 tablet (10 mg) by mouth 2 times daily (before meals)    Associated Diagnoses: Type 2 diabetes mellitus with diabetic nephropathy, without long-term current use of insulin (H)      hydrOXYzine (ATARAX) 25 MG tablet Take 25 mg by mouth 3 times daily as needed for anxiety      lamoTRIgine (LAMICTAL) 100 MG tablet Take 100 mg by mouth 2 times daily      lisinopril (ZESTRIL) 5 MG tablet Take 5 mg by mouth daily      metFORMIN (GLUCOPHAGE) 500 MG tablet Take 500 mg by mouth 2 times daily (with meals)      methocarbamol (ROBAXIN) 500 MG tablet Take 1 tablet (500 mg) by mouth every 12 hours as needed for muscle spasms    Associated Diagnoses: Chronic left-sided low back pain with left-sided sciatica      mirabegron (MYRBETRIQ) 25 MG 24 hr tablet Take 25 mg by mouth At Bedtime       oxyCODONE 7.5 MG TABS Take 7.5 mg by mouth every 6 hours as needed for moderate to severe pain  Qty: 10 tablet, Refills: 0    Associated Diagnoses: Chronic left-sided low back pain with left-sided sciatica      oxyCODONE-acetaminophen (PERCOCET) 5-325 MG tablet Take 1 tablet by mouth every 6 hours as needed for severe pain (Max 4 tablets daily)      triamterene-HCTZ (MAXZIDE-25) 37.5-25 MG tablet TAKE 1 CAPSULE BY MOUTH EVERY DAY INSTEAD OF CHLORTHALIDONE      acetaminophen (TYLENOL) 325 MG tablet Take 1-2 tablets (325-650 mg) by mouth every 6 hours as needed for mild pain    Associated Diagnoses: Chronic midline low back pain with right-sided sciatica; Other chronic pain      blood glucose (ACCU-CHEK FASTCLIX) lancing device FOR TESTING ONCE DAILY. DX  E11.9 TYPE 2 DIABETES      !! blood glucose (CONTOUR TEST) test strip  TESTING EVERY DAY DX  E11.9      !! CONTOUR NEXT TEST test strip TESTING EVERY DAY DX  E11.9      cyanocobalamin 1000 MCG SUBL Place 1,000 mcg under the tongue daily      diclofenac (VOLTAREN) 1 % topical gel Apply 4 g topically 4 times daily  Qty: 150 g, Refills: 0    Associated Diagnoses: Chronic left-sided low back pain with left-sided sciatica      DULoxetine (CYMBALTA) 60 MG EC capsule TAKE 1 CAPSULE (60 MG) BY MOUTH DAILY  Qty: 90 capsule, Refills: 1    Comments: Needs to be seen by March  Associated Diagnoses: Depressed bipolar I disorder in remission (H)      ferrous sulfate (FEROSUL) 325 (65 Fe) MG tablet Take 1 tablet (325 mg) by mouth daily      insulin aspart (NOVOLOG PEN) 100 UNIT/ML pen Correction Scale - MEDIUM INSULIN RESISTANCE DOSING     Do Not give Correction Insulin if Pre-Meal BG less than 140.   For Pre-Meal  - 189 give 1 unit.   For Pre-Meal  - 239 give 2 units.   For Pre-Meal  - 289 give 3 units.   For Pre-Meal  - 339 give 4 units.   For Pre-Meal - 399 give 5 units.   For Pre-Meal -449 give 6 units  For Pre-Meal BG greater than or equal to 450 give 7 units.   To be given with prandial insulin, and based on pre-meal blood glucose.    Notify provider if glucose greater than or equal to 350 mg/dL after administration of correction dose.  Qty: 15 mL      JARDIANCE 25 MG TABS tablet Take 25 mg by mouth daily       melatonin 1 MG TABS tablet Take 1 tablet (1 mg) by mouth nightly as needed for sleep      miconazole (MICATIN) 2 % external powder Apply topically 2 times daily To the skin fold      mineral oil-hydrophilic petrolatum (AQUAPHOR) external ointment Apply topically 3 times daily To the dry skin      omeprazole (PRILOSEC) 20 MG DR capsule Take 20 mg by mouth daily       order for DME Equipment being ordered: wrist brace  Qty: 1 Device, Refills: 0    Associated Diagnoses: Tenosynovitis, de Quervain      polyethylene glycol (MIRALAX) 17 GM/Dose powder Take  17 g by mouth daily  Qty: 510 g, Refills: 0    Associated Diagnoses: Chronic left-sided low back pain with left-sided sciatica      predniSONE (DELTASONE) 1 MG tablet Take 6 mg by mouth daily       pregabalin (LYRICA) 25 MG capsule Take 1 capsule (25 mg) by mouth At Bedtime  Qty: 10 capsule, Refills: 0    Associated Diagnoses: Chronic left-sided low back pain with left-sided sciatica      senna-docusate (SENOKOT-S/PERICOLACE) 8.6-50 MG tablet Take 1 tablet by mouth 2 times daily  Qty: 30 tablet, Refills: 0    Associated Diagnoses: Chronic left-sided low back pain with left-sided sciatica      triamcinolone (KENALOG) 0.1 % external cream Apply topically 2 times daily as needed       vitamin D3 (CHOLECALCIFEROL) 50 mcg (2000 units) tablet Take 3 tablets by mouth daily       !! - Potential duplicate medications found. Please discuss with provider.        Allergies   Allergies   Allergen Reactions     Propofol Nausea and Vomiting     Shellfish Allergy      Other reaction(s): Dizziness     Capsaicin Rash and Blisters

## 2022-04-26 ENCOUNTER — PATIENT OUTREACH (OUTPATIENT)
Dept: CARE COORDINATION | Facility: CLINIC | Age: 82
End: 2022-04-26
Payer: MEDICARE

## 2022-04-26 DIAGNOSIS — Z71.89 OTHER SPECIFIED COUNSELING: ICD-10-CM

## 2022-04-26 NOTE — PROGRESS NOTES
Clinic Care Coordination Contact  [facility]/Voicemail       Clinical Data: Care Coordinator Outreach    Outreach attempted x 1.  Unable to leave message on patient's voicemail with call back information, Phone rang, sounded like someone picked up but then the call dropped.    Plan:  Care Coordinator will try to reach patient again in 1-2 business days.    Cecille Gómez, MARK  [phone]  [facility]

## 2022-04-27 NOTE — PROGRESS NOTES
Clinic Care Coordination Contact  [facility]/Voicemail       Clinical Data: Care Coordinator Outreach    Outreach attempted x 2.  Unable to leave message on patient's voicemail with call back information, not able to connect.    Plan:  Care Coordinator will do no further outreaches at this time.    Cecille Gómez, [facility]  [phone]  [facility]

## 2022-05-24 ENCOUNTER — OFFICE VISIT (OUTPATIENT)
Dept: RHEUMATOLOGY | Facility: CLINIC | Age: 82
End: 2022-05-24
Payer: MEDICARE

## 2022-05-24 VITALS
SYSTOLIC BLOOD PRESSURE: 160 MMHG | DIASTOLIC BLOOD PRESSURE: 60 MMHG | HEIGHT: 65 IN | HEART RATE: 84 BPM | BODY MASS INDEX: 32.99 KG/M2 | WEIGHT: 198 LBS

## 2022-05-24 DIAGNOSIS — M15.0 PRIMARY OSTEOARTHRITIS INVOLVING MULTIPLE JOINTS: Primary | ICD-10-CM

## 2022-05-24 DIAGNOSIS — M25.50 POLYARTHRALGIA: ICD-10-CM

## 2022-05-24 PROCEDURE — 20600 DRAIN/INJ JOINT/BURSA W/O US: CPT | Mod: LT | Performed by: INTERNAL MEDICINE

## 2022-05-24 PROCEDURE — 99213 OFFICE O/P EST LOW 20 MIN: CPT | Mod: 25 | Performed by: INTERNAL MEDICINE

## 2022-05-24 NOTE — PROGRESS NOTES
"      Rheumatology follow-up visit note     Tonya is a 81 year old female presents today for follow-up.    Tonya was seen today for recheck.    Diagnoses and all orders for this visit:    Primary osteoarthritis involving multiple joints  -     triamcinolone acetonide (KENALOG-10) injection 10 mg    Polyarthralgia  -     triamcinolone acetonide (KENALOG-10) injection 10 mg            After pros and cons were discussed including risk of infection, bleeding, skin changes including thinning, pigmentary alteration and scarring to name a few, MCP #2 on the left side injected as noted in the orders section. This was done with nontouch technique.  The patient tolerated the procedure well and had a brisk Marcaine effect.  The postinjection care was discussed.      Follow up in 3 months.    HPI    Tonya Yang is a 81 year old female is here for follow-up. She has widespread osteoarthritis.  At 1 point thought to have rheumatoid arthritis/inflammatory joint disease, this was elsewhere.  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  She has had worsening pain in her left hand, this is placed under the second MCP.  Most recent time she had this joint injected was more than 6 months ago.  She is currently has transitional care subsequent to pneumonia and spinal stenosis related symptoms.  Further historical information, including ROS and limitation in activities as noted in the multidimensional health assessment questionnaire scanned in the EMR and in the assessment and plan section.        DETAILED EXAMINATION  05/24/22  :    Vitals:    05/24/22 1324   BP: (!) 160/60   BP Location: Right arm   Patient Position: Sitting   Cuff Size: Adult Regular   Pulse: 84   Weight: 89.8 kg (198 lb)   Height: 1.651 m (5' 5\")     Alert oriented.  She is in wheelchair.  Head including the face is examined for malar rash, heliotropes, scarring, " lupus pernio. Eyes examined for redness such as in episcleritis/scleritis, periorbital lesions.   Neck/ Face examined for parotid gland swelling, range of motion of neck.  Left upper and lower and right upper and lower extremities examined for tenderness, swelling, warmth of the appendicular joints, range of motion, edema, rash.  Some of the important findings included: she does not have evidence of synovitis in the palpable joints of the upper extremities.  She is tender in the left second MCP.  No significant deformities of the digits.  Small Heberden nodes.  Range of motion of the shoulders  show full abduction.  No JLT effusion or warmth of the knees.  she does not have dactylitis of the digits.     Patient Active Problem List    Diagnosis Date Noted     Hypokalemia 04/22/2022     Priority: Medium     Contusion of coccyx, initial encounter 04/22/2022     Priority: Medium     Weakness 04/07/2022     Priority: Medium     Fall, initial encounter 04/07/2022     Priority: Medium     Bilateral hearing loss, unspecified hearing loss type 03/24/2022     Priority: Medium     Spasm of back muscles 03/24/2022     Priority: Medium     Neural foraminal stenosis of cervical spine 03/24/2022     Priority: Medium     Multilevel spondylosis 03/24/2022     Priority: Medium     Anxiety and depression 03/24/2022     Priority: Medium     Slow transit constipation 03/24/2022     Priority: Medium     Paravertebral mass 03/13/2022     Priority: Medium     Abnormal finding on CT scan 03/13/2022     Priority: Medium     Pneumonia of right lower lobe due to infectious organism 03/07/2022     Priority: Medium     Severe sepsis (H) 03/07/2022     Priority: Medium     Type 2 diabetes mellitus with hyperglycemia (H) 03/07/2022     Priority: Medium     Glycosuria 03/07/2022     Priority: Medium     Confusion 07/20/2021     Priority: Medium     Hyperglycemia 07/20/2021     Priority: Medium     Acute renal failure, unspecified acute renal  failure type (H) 07/20/2021     Priority: Medium     Bilateral deafness 01/23/2018     Priority: Medium     Class 2 obesity due to excess calories with serious comorbidity and body mass index (BMI) of 37.0 to 37.9 in adult 10/24/2017     Priority: Medium     Type 2 diabetes mellitus with diabetic nephropathy, without long-term current use of insulin (H) 02/21/2017     Priority: Medium     Muscle weakness (generalized) worse since 11-16 w drowsiness 02/14/2017     Priority: Medium     Chronic pain syndrome-issue of broken controlled sub agreement  04/25/2016     Priority: Medium     Patient is followed by Bethanie Finnegan MD for ongoing prescription of pain medication.  All refills should be approved by this provider, or covering partner.    AGREEMENT BROKEN  By getting meds from an outside provider  See 4-2016 notes     Medication(s): oxycodone a 5mgm /day .   Maximum quantity per month: 30   Clinic visit frequency required: Q 3 months     Controlled substance agreement on file: Yes       Date(s): 9-23-15    Pain Clinic evaluation in the past: No    DIRE Total Score(s):  No flowsheet data found.    Last [facility] website verification:  done on 5-2-16   [email or web address]         Bilateral leg edema worsening w her increasing inactivity  04/25/2016     Priority: Medium     Sensorineural hearing loss, bilateral 04/19/2016     Priority: Medium     Long-term (current) use of anticoagulants [Z79.01] 03/22/2016     Priority: Medium     Essential hypertension 03/22/2016     Priority: Medium     Ankle edema 03/22/2016     Priority: Medium     Microalbuminuria 03/17/2016     Priority: Medium     Leukocytosis w Lt shift  03/17/2016     Priority: Medium     Localized edema-L>R lat malleolus 02/20/2016     Priority: Medium     Mixed hyperlipidemia 02/18/2016     Priority: Medium     Primary insomnia 12/03/2015     Priority: Medium     Bilateral back pain 11/12/2015     Priority: Medium     Left-sided low back pain with  left-sided sciatica since 1993 w reoccurrence 6-15  11/03/2015     Priority: Medium     Meniere's disease (cochlear hydrops), LT 11/03/2015     Priority: Medium     Steroid dependent for cochlear hydrops  since 1985 11/03/2015     Priority: Medium     Lower urinary tract infectious disease 10/25/2015     Priority: Medium     Diagnosis updated by automated process. Provider to review and confirm.       Ambriz's cyst, left 07/17/2015     Priority: Medium     Diarrhea r/o exacerbated by metformin -since 30's 06/30/2015     Priority: Medium     Bipolar disorder, in full remission, most recent episode depressed (H) 06/04/2015     Priority: Medium     Temporomandibular joint disorder 12/01/2014     Priority: Medium     Problem list name updated by automated process. Provider to review       Elevated liver enzymes since 12-13 liver abscess I&D 01/16/2014     Priority: Medium     Abscess,& FB in liver in 4-10 & 4-11 w recurrent pain in RUQ  in 7-13 s/po lap I&D and unroofing sans finding of an FB  in 11-13 11/26/2013     Priority: Medium     Deafness 10/30/2013     Priority: Medium     Iron deficiency anemia due to chronic blood loss 10/03/2013     Priority: Medium     Health Care Home 08/30/2013     Priority: Medium     [facility]  Patient Care Plan  About Me  Patient Name:  Tonya Yang    YOB: 1940  Age:   72 year old   [facility] MRN: [id number] Telephone Information:     Home Phone [phone]   Mobile [phone]       Address:    [facility] [address]  [address] [address] Email address:  No e-mail address on record      Emergency Contact(s)  Name Relationship Lgl Grd Work Phone Home Phone Mobile Phone   1MANJU LARIOS Son   [phone]            Primary language:  English     needed? No   [facility] [employer] [facility]:  [phone] op. 1  Other communication barriers: Hard of hearing  Preferred Method of Communication:  Phone  Current living arrangement: I live in a  private home with family  Mobility Status/ Medical Equipment:    Other information to know about me:    Health Maintenance  Immunizations:     Cancer Screening:     My Access Plan  Medical Emergency 911   Primary Clinic Line [phone]   24 Hour Appointment Line [phone] or  [phone]) (toll-free)   24 Hour Nurse Line [phone] (toll-free)   Preferred Urgent Care     Preferred Hospital [facility]   Preferred Pharmacy [id number] - [place], [place] - [address]     Behavioral Health Crisis Line Crisis Connection, [phone] or 911     My Care Team Members  Patient Care Team       Relationship Specialty Notifications Start End    Bethanie Finnegan MD PCP - General Family Practice  8/22/13     Phone: [phone] Fax: [phone]         [facility] [address] [facility] [address]    Valencia Sanchez RN [facility] Care Coordinator   8/30/13     Phone: [phone] Fax: [phone]             My Care Plans  Self Management and Treatment Plan  Presenting Concern Signs and Symptoms Goal/Action Plan   Home IV antibiotics x 8 weeks  [facility] - [phone]   Start Date:8/30/13 Active/ Initiated with: Date Reviewed:           Start Date: Active/Complete Initiated with: Date Reviewed:           Start Date: Active/Complete Initiated with: Date Reviewed:       Action Plans on File: None  Advance Care Plans/Directives on file:  No           My Medical and Care Information  Problem List   Patient Active Problem List   Diagnosis     Postmenopausal atrophic vaginitis     Steroid dependent     Bipolar disorder     Chronic diarrhea     Type 2 diabetes, HbA1C goal < 7%     Hyperlipidemia LDL goal <100     IBS (irritable bowel syndrome)     Major depression in complete remission     Obesity     Meniere's disease (cochlear hydrops)     GERD (gastroesophageal reflux disease)     Hypertension goal BP (blood pressure) < 130/80     CKD (chronic kidney disease)  stage 3, GFR 30-59 ml/min     Tobacco abuse: [age] y/o @ 2ppd= 65pk yr hx      Obstructive lung disease : moderate/spirometry w oximetry=91% on 7-18-13      Anemia     Abscess,& FB in liver in 4-10 & 4-11 w recurrent pain in RUQ  in 7-13      Hepatic abscess            Allergies   Allergies   Allergen Reactions     Propofol      Nausea and vomiting        Health Care Home Complexity Tier:      Care Coordination Start Date: 08/30/13   Frequency of Care Coordination: every 2 weeks   Form Last Updated: 08/30/2013            CKD (chronic kidney disease) stage 3, GFR 30-59 ml/min (H) 07/18/2013     Priority: Medium     IBS (irritable bowel syndrome) 06/20/2013     Priority: Medium     Meniere's disease (cochlear hydrops) - on prednisone and triamterene hydrochlorothiazide 06/20/2013     Priority: Medium     GERD (gastroesophageal reflux disease) 06/20/2013     Priority: Medium     Postmenopausal atrophic vaginitis 02/13/2013     Priority: Medium     Past Surgical History:   Procedure Laterality Date     CATARACT IOL, RT/LT Left 7/2013     FOOT SURGERY      toe     GI SURGERY       IR LUMBAR/SACRAL TRANSFOR INJ BILATERAL Bilateral 3/11/2022     LAPAROSCOPIC HEPATECTOMY PARTIAL  11/4/2013    Procedure: LAPAROSCOPIC HEPATECTOMY PARTIAL;  Laparoscopic Debridement of Liver Abcess;  Surgeon: Sepideh Green MD;  Location: [facility] OR     ORTHOPEDIC SURGERY       PICC INSERTION  8/28/2013    5fr DL Power PICC, 41cm (1cm external length) in the R lateral brachial vein with tip in the SVC RA junction.     RECTAL SURGERY      1970s     VASCULAR SURGERY        Past Medical History:   Diagnosis Date     Abdominal pain      Anxiety      Arthritis      Asthma      Bipolar 1 disorder (H)      Chronic pain      Cochlear hydrops 1988    steriods and diazide     COPD (chronic obstructive pulmonary disease) (H)      Depression      Diabetes mellitus (H)      Dyspepsia      GERD (gastroesophageal reflux disease)      Hard of hearing      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     Hepatic abscess      Hyperlipidemia      Hypertension      Irritable bowel syndrome      Meniere disease      Neuropathy      Noninfectious ileitis      Peritoneal abscess (H)      Spinal stenosis of lumbar region      Steroid long-term use      Vaginitis, atrophic, postmenopausal      Vitamin D deficiency      Allergies   Allergen Reactions     Propofol Nausea and Vomiting     Shellfish Allergy      Other reaction(s): Dizziness     Capsaicin Rash and Blisters     Current Outpatient Medications   Medication Sig Dispense Refill     acetaminophen (TYLENOL) 325 MG tablet Take 1-2 tablets (325-650 mg) by mouth every 6 hours as needed for mild pain       atorvastatin (LIPITOR) 10 MG tablet Take 10 mg by mouth At Bedtime       blood glucose (ACCU-CHEK FASTCLIX) lancing device FOR TESTING ONCE DAILY. DX  E11.9 TYPE 2 DIABETES       blood glucose (CONTOUR TEST) test strip TESTING EVERY DAY DX  E11.9       carvedilol (COREG) 3.125 MG tablet Take 1 tablet (3.125 mg) by mouth 2 times daily (with meals)       CONTOUR NEXT TEST test strip TESTING EVERY DAY DX  E11.9       cyanocobalamin 1000 MCG SUBL Place 1,000 mcg under the tongue daily       diclofenac (VOLTAREN) 1 % topical gel Apply 4 g topically 4 times daily 150 g 0     DULoxetine (CYMBALTA) 60 MG EC capsule TAKE 1 CAPSULE (60 MG) BY MOUTH DAILY 90 capsule 1     ferrous sulfate (FEROSUL) 325 (65 Fe) MG tablet Take 1 tablet (325 mg) by mouth daily       furosemide (LASIX) 20 MG tablet Take 1 tablet (20 mg) by mouth every evening 2 pm 30 tablet 0     gabapentin (NEURONTIN) 100 MG capsule Take 100 mg by mouth 4 times daily       glipiZIDE (GLUCOTROL) 10 MG tablet Take 1 tablet (10 mg) by mouth 2 times daily (before meals)       hydrOXYzine (ATARAX) 25 MG tablet Take 25 mg by mouth 3 times daily as needed for anxiety       insulin aspart (NOVOLOG PEN) 100 UNIT/ML pen Correction Scale - MEDIUM INSULIN RESISTANCE DOSING     Do  Not give Correction Insulin if Pre-Meal BG less than 140.   For Pre-Meal  - 189 give 1 unit.   For Pre-Meal  - 239 give 2 units.   For Pre-Meal  - 289 give 3 units.   For Pre-Meal  - 339 give 4 units.   For Pre-Meal - 399 give 5 units.   For Pre-Meal -449 give 6 units  For Pre-Meal BG greater than or equal to 450 give 7 units.   To be given with prandial insulin, and based on pre-meal blood glucose.    Notify provider if glucose greater than or equal to 350 mg/dL after administration of correction dose. 15 mL      JARDIANCE 25 MG TABS tablet Take 25 mg by mouth daily        lamoTRIgine (LAMICTAL) 100 MG tablet Take 100 mg by mouth 2 times daily       lisinopril (ZESTRIL) 5 MG tablet Take 5 mg by mouth daily       melatonin 1 MG TABS tablet Take 1 tablet (1 mg) by mouth nightly as needed for sleep       metFORMIN (GLUCOPHAGE) 500 MG tablet Take 500 mg by mouth 2 times daily (with meals)       methocarbamol (ROBAXIN) 500 MG tablet Take 1 tablet (500 mg) by mouth every 12 hours as needed for muscle spasms       miconazole (MICATIN) 2 % external powder Apply topically 2 times daily To the skin fold       mineral oil-hydrophilic petrolatum (AQUAPHOR) external ointment Apply topically 3 times daily To the dry skin       mirabegron (MYRBETRIQ) 25 MG 24 hr tablet Take 25 mg by mouth At Bedtime        omeprazole (PRILOSEC) 20 MG DR capsule Take 20 mg by mouth daily        order for DME Equipment being ordered: wrist brace 1 Device 0     oxyCODONE 7.5 MG TABS Take 7.5 mg by mouth every 6 hours as needed for moderate to severe pain 10 tablet 0     oxyCODONE-acetaminophen (PERCOCET) 5-325 MG tablet Take 1 tablet by mouth every 6 hours as needed for severe pain (Max 4 tablets daily)       polyethylene glycol (MIRALAX) 17 GM/Dose powder Take 17 g by mouth daily 510 g 0     predniSONE (DELTASONE) 1 MG tablet Take 6 mg by mouth daily        pregabalin (LYRICA) 25 MG capsule Take 1 capsule (25 mg)  by mouth At Bedtime 10 capsule 0     senna-docusate (SENOKOT-S/PERICOLACE) 8.6-50 MG tablet Take 1 tablet by mouth 2 times daily 30 tablet 0     triamcinolone (KENALOG) 0.1 % external cream Apply topically 2 times daily as needed        triamterene-HCTZ (MAXZIDE-25) 37.5-25 MG tablet TAKE 1 CAPSULE BY MOUTH EVERY DAY INSTEAD OF CHLORTHALIDONE       vitamin D3 (CHOLECALCIFEROL) 50 mcg (2000 units) tablet Take 3 tablets by mouth daily       family history includes Cerebrovascular Disease in her mother; Heart Disease in her brother, father, and mother.  Social Connections: Not on file          WBC   Date Value Ref Range Status   04/12/2016 8.9 4.0 - 11.0 10e9/L Final     WBC Count   Date Value Ref Range Status   04/23/2022 7.5 4.0 - 11.0 10e3/uL Final     RBC Count   Date Value Ref Range Status   04/23/2022 4.37 3.80 - 5.20 10e6/uL Final   04/12/2016 3.99 3.8 - 5.2 10e12/L Final     Hemoglobin   Date Value Ref Range Status   04/23/2022 10.0 (L) 11.7 - 15.7 g/dL Final   10/24/2017 12.2 11.7 - 15.7 g/dL Final     Hematocrit   Date Value Ref Range Status   04/23/2022 37.0 35.0 - 47.0 % Final   04/12/2016 36.0 35.0 - 47.0 % Final     MCV   Date Value Ref Range Status   04/23/2022 85 78 - 100 fL Final   04/12/2016 90 78 - 100 fl Final     MCH   Date Value Ref Range Status   04/23/2022 22.9 (L) 26.5 - 33.0 pg Final   04/12/2016 26.1 (L) 26.5 - 33.0 pg Final     Platelet Count   Date Value Ref Range Status   04/23/2022 348 150 - 450 10e3/uL Final   04/12/2016 287 150 - 450 10e9/L Final     % Lymphocytes   Date Value Ref Range Status   04/22/2022 26 % Final   04/12/2016 24.6 % Final     AST   Date Value Ref Range Status   04/22/2022 16 0 - 45 U/L Final   02/14/2017 25 0 - 45 U/L Final     ALT   Date Value Ref Range Status   04/22/2022 28 0 - 50 U/L Final   02/14/2017 26 0 - 50 U/L Final     Albumin   Date Value Ref Range Status   04/22/2022 3.4 3.4 - 5.0 g/dL Final   02/14/2017 3.6 3.4 - 5.0 g/dL Final     Alkaline Phosphatase    Date Value Ref Range Status   04/22/2022 88 40 - 150 U/L Final   02/14/2017 85 40 - 150 U/L Final     Creatinine   Date Value Ref Range Status   04/23/2022 0.86 0.52 - 1.04 mg/dL Final   10/24/2017 0.84 0.52 - 1.04 mg/dL Final     GFR Estimate   Date Value Ref Range Status   04/23/2022 68 >60 mL/min/1.73m2 Final     Comment:     Effective December 21, 2021 eGFRcr in adults is calculated using the 2021 CKD-EPI creatinine equation which includes age and gender (Teena et al., NEJM, DOI: 10.1056/PEXQcb0370357)   12/29/2020 >60 >60 mL/min/1.73m2 Final   10/24/2017 66 >60 mL/min/1.7m2 Final     Comment:     Non  GFR Calc     GFR Estimate If Black   Date Value Ref Range Status   12/29/2020 >60 >60 mL/min/1.73m2 Final   10/24/2017 80 >60 mL/min/1.7m2 Final     Comment:      GFR Calc     Creatinine Urine   Date Value Ref Range Status   02/14/2017 82 mg/dL Final     Sed Rate   Date Value Ref Range Status   10/23/2013 10 0 - 30 mm/h Final     Erythrocyte Sedimentation Rate   Date Value Ref Range Status   03/07/2022 5 0 - 20 mm/hr Final     C-Reactive Protein   Date Value Ref Range Status   04/21/2011 4.4 0.0 - 4.9 mg/L Final     CRP Inflammation   Date Value Ref Range Status   10/23/2013 8.4 (H) 0.0 - 8.0 mg/L Final     CRP   Date Value Ref Range Status   03/07/2022 3.5 (H) 0.0-<0.8 mg/dL Final

## 2022-05-26 ENCOUNTER — APPOINTMENT (OUTPATIENT)
Dept: CT IMAGING | Facility: CLINIC | Age: 82
DRG: 552 | End: 2022-05-26
Attending: EMERGENCY MEDICINE
Payer: MEDICARE

## 2022-05-26 ENCOUNTER — HOSPITAL ENCOUNTER (INPATIENT)
Facility: CLINIC | Age: 82
LOS: 4 days | Discharge: HOME HEALTH | DRG: 552 | End: 2022-05-31
Attending: EMERGENCY MEDICINE | Admitting: HOSPITALIST
Payer: MEDICARE

## 2022-05-26 DIAGNOSIS — K21.9 GASTROESOPHAGEAL REFLUX DISEASE WITHOUT ESOPHAGITIS: ICD-10-CM

## 2022-05-26 DIAGNOSIS — E11.65 TYPE 2 DIABETES MELLITUS WITH HYPERGLYCEMIA, UNSPECIFIED WHETHER LONG TERM INSULIN USE (H): ICD-10-CM

## 2022-05-26 DIAGNOSIS — R60.0 LOWER EXTREMITY EDEMA: ICD-10-CM

## 2022-05-26 DIAGNOSIS — E87.6 HYPOKALEMIA: ICD-10-CM

## 2022-05-26 DIAGNOSIS — M48.00 SPINAL STENOSIS, UNSPECIFIED SPINAL REGION: Primary | ICD-10-CM

## 2022-05-26 DIAGNOSIS — G89.4 CHRONIC PAIN SYNDROME: ICD-10-CM

## 2022-05-26 DIAGNOSIS — I73.9 CLAUDICATION OF BOTH LOWER EXTREMITIES (H): ICD-10-CM

## 2022-05-26 LAB
ALBUMIN SERPL-MCNC: 3.8 G/DL (ref 3.5–5)
ALP SERPL-CCNC: 77 U/L (ref 45–120)
ALT SERPL W P-5'-P-CCNC: 17 U/L (ref 0–45)
ANION GAP SERPL CALCULATED.3IONS-SCNC: 14 MMOL/L (ref 5–18)
APTT PPP: 29 SECONDS (ref 22–38)
AST SERPL W P-5'-P-CCNC: 14 U/L (ref 0–40)
BASOPHILS # BLD AUTO: 0 10E3/UL (ref 0–0.2)
BASOPHILS NFR BLD AUTO: 0 %
BILIRUB SERPL-MCNC: 0.3 MG/DL (ref 0–1)
BNP SERPL-MCNC: 56 PG/ML (ref 0–151)
BUN SERPL-MCNC: 21 MG/DL (ref 8–28)
CALCIUM SERPL-MCNC: 10.2 MG/DL (ref 8.5–10.5)
CHLORIDE BLD-SCNC: 103 MMOL/L (ref 98–107)
CO2 SERPL-SCNC: 26 MMOL/L (ref 22–31)
CREAT SERPL-MCNC: 0.83 MG/DL (ref 0.6–1.1)
EOSINOPHIL # BLD AUTO: 0.1 10E3/UL (ref 0–0.7)
EOSINOPHIL NFR BLD AUTO: 1 %
ERYTHROCYTE [DISTWIDTH] IN BLOOD BY AUTOMATED COUNT: 16.7 % (ref 10–15)
GFR SERPL CREATININE-BSD FRML MDRD: 71 ML/MIN/1.73M2
GLUCOSE BLD-MCNC: 125 MG/DL (ref 70–125)
HCT VFR BLD AUTO: 36.6 % (ref 35–47)
HGB BLD-MCNC: 10.3 G/DL (ref 11.7–15.7)
IMM GRANULOCYTES # BLD: 0 10E3/UL
IMM GRANULOCYTES NFR BLD: 0 %
INR PPP: 1.01 (ref 0.85–1.15)
LYMPHOCYTES # BLD AUTO: 2.7 10E3/UL (ref 0.8–5.3)
LYMPHOCYTES NFR BLD AUTO: 29 %
MAGNESIUM SERPL-MCNC: 1.8 MG/DL (ref 1.8–2.6)
MCH RBC QN AUTO: 22.9 PG (ref 26.5–33)
MCHC RBC AUTO-ENTMCNC: 28.1 G/DL (ref 31.5–36.5)
MCV RBC AUTO: 81 FL (ref 78–100)
MONOCYTES # BLD AUTO: 0.8 10E3/UL (ref 0–1.3)
MONOCYTES NFR BLD AUTO: 8 %
NEUTROPHILS # BLD AUTO: 5.8 10E3/UL (ref 1.6–8.3)
NEUTROPHILS NFR BLD AUTO: 62 %
NRBC # BLD AUTO: 0 10E3/UL
NRBC BLD AUTO-RTO: 0 /100
PLATELET # BLD AUTO: 385 10E3/UL (ref 150–450)
POTASSIUM BLD-SCNC: 3.7 MMOL/L (ref 3.5–5)
PROT SERPL-MCNC: 6.8 G/DL (ref 6–8)
RBC # BLD AUTO: 4.5 10E6/UL (ref 3.8–5.2)
SODIUM SERPL-SCNC: 143 MMOL/L (ref 136–145)
TROPONIN I SERPL-MCNC: <0.01 NG/ML (ref 0–0.29)
WBC # BLD AUTO: 9.5 10E3/UL (ref 4–11)

## 2022-05-26 PROCEDURE — C9803 HOPD COVID-19 SPEC COLLECT: HCPCS

## 2022-05-26 PROCEDURE — 83880 ASSAY OF NATRIURETIC PEPTIDE: CPT | Performed by: EMERGENCY MEDICINE

## 2022-05-26 PROCEDURE — 85730 THROMBOPLASTIN TIME PARTIAL: CPT | Performed by: EMERGENCY MEDICINE

## 2022-05-26 PROCEDURE — 75635 CT ANGIO ABDOMINAL ARTERIES: CPT

## 2022-05-26 PROCEDURE — 250N000011 HC RX IP 250 OP 636: Performed by: EMERGENCY MEDICINE

## 2022-05-26 PROCEDURE — 99285 EMERGENCY DEPT VISIT HI MDM: CPT | Mod: 25

## 2022-05-26 PROCEDURE — 85014 HEMATOCRIT: CPT | Performed by: EMERGENCY MEDICINE

## 2022-05-26 PROCEDURE — 84484 ASSAY OF TROPONIN QUANT: CPT | Performed by: EMERGENCY MEDICINE

## 2022-05-26 PROCEDURE — 83735 ASSAY OF MAGNESIUM: CPT | Performed by: EMERGENCY MEDICINE

## 2022-05-26 PROCEDURE — 93005 ELECTROCARDIOGRAM TRACING: CPT | Performed by: EMERGENCY MEDICINE

## 2022-05-26 PROCEDURE — 80053 COMPREHEN METABOLIC PANEL: CPT | Performed by: EMERGENCY MEDICINE

## 2022-05-26 PROCEDURE — 85610 PROTHROMBIN TIME: CPT | Performed by: EMERGENCY MEDICINE

## 2022-05-26 PROCEDURE — 36415 COLL VENOUS BLD VENIPUNCTURE: CPT | Performed by: EMERGENCY MEDICINE

## 2022-05-26 RX ORDER — OXYBUTYNIN CHLORIDE 15 MG/1
15 TABLET, EXTENDED RELEASE ORAL EVERY MORNING
COMMUNITY
End: 2023-12-14

## 2022-05-26 RX ORDER — IOPAMIDOL 755 MG/ML
100 INJECTION, SOLUTION INTRAVASCULAR ONCE
Status: COMPLETED | OUTPATIENT
Start: 2022-05-26 | End: 2022-05-26

## 2022-05-26 RX ORDER — DIAZEPAM 5 MG/5ML
0.5 SOLUTION ORAL 2 TIMES DAILY PRN
Status: ON HOLD | COMMUNITY
Start: 2022-05-26 | End: 2023-03-22

## 2022-05-26 RX ORDER — MORPHINE SULFATE 4 MG/ML
4 INJECTION, SOLUTION INTRAMUSCULAR; INTRAVENOUS ONCE
Status: COMPLETED | OUTPATIENT
Start: 2022-05-27 | End: 2022-05-27

## 2022-05-26 RX ADMIN — IOPAMIDOL 100 ML: 755 INJECTION, SOLUTION INTRAVENOUS at 22:02

## 2022-05-26 ASSESSMENT — ENCOUNTER SYMPTOMS
NUMBNESS: 1
SHORTNESS OF BREATH: 0

## 2022-05-27 ENCOUNTER — APPOINTMENT (OUTPATIENT)
Dept: OCCUPATIONAL THERAPY | Facility: CLINIC | Age: 82
DRG: 552 | End: 2022-05-27
Attending: HOSPITALIST
Payer: MEDICARE

## 2022-05-27 ENCOUNTER — APPOINTMENT (OUTPATIENT)
Dept: PHYSICAL THERAPY | Facility: CLINIC | Age: 82
DRG: 552 | End: 2022-05-27
Attending: HOSPITALIST
Payer: MEDICARE

## 2022-05-27 ENCOUNTER — TELEPHONE (OUTPATIENT)
Dept: VASCULAR SURGERY | Facility: CLINIC | Age: 82
End: 2022-05-27

## 2022-05-27 ENCOUNTER — APPOINTMENT (OUTPATIENT)
Dept: ULTRASOUND IMAGING | Facility: CLINIC | Age: 82
DRG: 552 | End: 2022-05-27
Attending: HOSPITALIST
Payer: MEDICARE

## 2022-05-27 PROBLEM — I73.9 CLAUDICATION OF BOTH LOWER EXTREMITIES (H): Status: ACTIVE | Noted: 2022-05-27

## 2022-05-27 PROBLEM — E11.65 TYPE 2 DIABETES MELLITUS WITH HYPERGLYCEMIA, UNSPECIFIED WHETHER LONG TERM INSULIN USE (H): Status: ACTIVE | Noted: 2022-05-27

## 2022-05-27 LAB
ATRIAL RATE - MUSE: 80 BPM
DIASTOLIC BLOOD PRESSURE - MUSE: NORMAL MMHG
GLUCOSE BLDC GLUCOMTR-MCNC: 113 MG/DL (ref 70–99)
GLUCOSE BLDC GLUCOMTR-MCNC: 186 MG/DL (ref 70–99)
GLUCOSE BLDC GLUCOMTR-MCNC: 193 MG/DL (ref 70–99)
INTERPRETATION ECG - MUSE: NORMAL
P AXIS - MUSE: 35 DEGREES
PR INTERVAL - MUSE: 142 MS
QRS DURATION - MUSE: 138 MS
QT - MUSE: 440 MS
QTC - MUSE: 507 MS
R AXIS - MUSE: 39 DEGREES
SARS-COV-2 RNA RESP QL NAA+PROBE: NEGATIVE
SYSTOLIC BLOOD PRESSURE - MUSE: NORMAL MMHG
T AXIS - MUSE: 17 DEGREES
VENTRICULAR RATE- MUSE: 80 BPM

## 2022-05-27 PROCEDURE — G0378 HOSPITAL OBSERVATION PER HR: HCPCS

## 2022-05-27 PROCEDURE — 82962 GLUCOSE BLOOD TEST: CPT

## 2022-05-27 PROCEDURE — 93922 UPR/L XTREMITY ART 2 LEVELS: CPT

## 2022-05-27 PROCEDURE — 250N000011 HC RX IP 250 OP 636: Performed by: EMERGENCY MEDICINE

## 2022-05-27 PROCEDURE — 99221 1ST HOSP IP/OBS SF/LOW 40: CPT | Performed by: SURGERY

## 2022-05-27 PROCEDURE — 97535 SELF CARE MNGMENT TRAINING: CPT | Mod: GO

## 2022-05-27 PROCEDURE — 250N000013 HC RX MED GY IP 250 OP 250 PS 637: Performed by: HOSPITALIST

## 2022-05-27 PROCEDURE — 250N000013 HC RX MED GY IP 250 OP 250 PS 637: Performed by: INTERNAL MEDICINE

## 2022-05-27 PROCEDURE — 250N000011 HC RX IP 250 OP 636: Performed by: INTERNAL MEDICINE

## 2022-05-27 PROCEDURE — 120N000004 HC R&B MS OVERFLOW

## 2022-05-27 PROCEDURE — 250N000012 HC RX MED GY IP 250 OP 636 PS 637: Performed by: HOSPITALIST

## 2022-05-27 PROCEDURE — 97166 OT EVAL MOD COMPLEX 45 MIN: CPT | Mod: GO

## 2022-05-27 PROCEDURE — 97162 PT EVAL MOD COMPLEX 30 MIN: CPT | Mod: GP

## 2022-05-27 PROCEDURE — 96374 THER/PROPH/DIAG INJ IV PUSH: CPT | Mod: 59

## 2022-05-27 PROCEDURE — U0003 INFECTIOUS AGENT DETECTION BY NUCLEIC ACID (DNA OR RNA); SEVERE ACUTE RESPIRATORY SYNDROME CORONAVIRUS 2 (SARS-COV-2) (CORONAVIRUS DISEASE [COVID-19]), AMPLIFIED PROBE TECHNIQUE, MAKING USE OF HIGH THROUGHPUT TECHNOLOGIES AS DESCRIBED BY CMS-2020-01-R: HCPCS | Performed by: EMERGENCY MEDICINE

## 2022-05-27 PROCEDURE — 96375 TX/PRO/DX INJ NEW DRUG ADDON: CPT

## 2022-05-27 PROCEDURE — 97530 THERAPEUTIC ACTIVITIES: CPT | Mod: GP

## 2022-05-27 PROCEDURE — 99223 1ST HOSP IP/OBS HIGH 75: CPT | Mod: AI | Performed by: HOSPITALIST

## 2022-05-27 RX ORDER — DEXTROSE MONOHYDRATE 25 G/50ML
25-50 INJECTION, SOLUTION INTRAVENOUS
Status: DISCONTINUED | OUTPATIENT
Start: 2022-05-27 | End: 2022-05-31 | Stop reason: HOSPADM

## 2022-05-27 RX ORDER — ONDANSETRON 4 MG/1
4 TABLET, ORALLY DISINTEGRATING ORAL EVERY 6 HOURS PRN
Status: DISCONTINUED | OUTPATIENT
Start: 2022-05-27 | End: 2022-05-31 | Stop reason: HOSPADM

## 2022-05-27 RX ORDER — GABAPENTIN 100 MG/1
200 CAPSULE ORAL 2 TIMES DAILY
Status: DISCONTINUED | OUTPATIENT
Start: 2022-05-27 | End: 2022-05-27

## 2022-05-27 RX ORDER — METHOCARBAMOL 500 MG/1
500 TABLET, FILM COATED ORAL EVERY 12 HOURS PRN
Status: DISCONTINUED | OUTPATIENT
Start: 2022-05-27 | End: 2022-05-27

## 2022-05-27 RX ORDER — ATORVASTATIN CALCIUM 40 MG/1
40 TABLET, FILM COATED ORAL AT BEDTIME
Status: DISCONTINUED | OUTPATIENT
Start: 2022-05-27 | End: 2022-05-30

## 2022-05-27 RX ORDER — NICOTINE POLACRILEX 4 MG
15-30 LOZENGE BUCCAL
Status: DISCONTINUED | OUTPATIENT
Start: 2022-05-27 | End: 2022-05-31 | Stop reason: HOSPADM

## 2022-05-27 RX ORDER — POLYETHYLENE GLYCOL 3350 17 G/17G
17 POWDER, FOR SOLUTION ORAL DAILY
Status: DISCONTINUED | OUTPATIENT
Start: 2022-05-27 | End: 2022-05-31 | Stop reason: HOSPADM

## 2022-05-27 RX ORDER — FUROSEMIDE 40 MG
40 TABLET ORAL DAILY
Status: DISCONTINUED | OUTPATIENT
Start: 2022-05-27 | End: 2022-05-31 | Stop reason: HOSPADM

## 2022-05-27 RX ORDER — GLIPIZIDE 10 MG/1
10 TABLET ORAL
Status: DISCONTINUED | OUTPATIENT
Start: 2022-05-27 | End: 2022-05-31 | Stop reason: HOSPADM

## 2022-05-27 RX ORDER — OXYCODONE AND ACETAMINOPHEN 5; 325 MG/1; MG/1
1 TABLET ORAL EVERY 6 HOURS PRN
Status: DISCONTINUED | OUTPATIENT
Start: 2022-05-27 | End: 2022-05-31 | Stop reason: HOSPADM

## 2022-05-27 RX ORDER — ACETAMINOPHEN 325 MG/1
325-650 TABLET ORAL EVERY 6 HOURS PRN
Status: DISCONTINUED | OUTPATIENT
Start: 2022-05-27 | End: 2022-05-31 | Stop reason: HOSPADM

## 2022-05-27 RX ORDER — LAMOTRIGINE 100 MG/1
100 TABLET ORAL 2 TIMES DAILY
Status: DISCONTINUED | OUTPATIENT
Start: 2022-05-27 | End: 2022-05-31 | Stop reason: HOSPADM

## 2022-05-27 RX ORDER — PREGABALIN 25 MG/1
25 CAPSULE ORAL AT BEDTIME
Status: DISCONTINUED | OUTPATIENT
Start: 2022-05-27 | End: 2022-05-27

## 2022-05-27 RX ORDER — PREGABALIN 25 MG/1
25 CAPSULE ORAL 2 TIMES DAILY
Status: DISCONTINUED | OUTPATIENT
Start: 2022-05-27 | End: 2022-05-29

## 2022-05-27 RX ORDER — OXYBUTYNIN CHLORIDE 5 MG/1
15 TABLET, EXTENDED RELEASE ORAL DAILY
Status: DISCONTINUED | OUTPATIENT
Start: 2022-05-27 | End: 2022-05-31 | Stop reason: HOSPADM

## 2022-05-27 RX ORDER — MORPHINE SULFATE 2 MG/ML
2 INJECTION, SOLUTION INTRAMUSCULAR; INTRAVENOUS EVERY 4 HOURS PRN
Status: DISCONTINUED | OUTPATIENT
Start: 2022-05-27 | End: 2022-05-27

## 2022-05-27 RX ORDER — ASPIRIN 81 MG/1
81 TABLET ORAL DAILY
Status: DISCONTINUED | OUTPATIENT
Start: 2022-05-27 | End: 2022-05-30

## 2022-05-27 RX ORDER — HYDROXYZINE HYDROCHLORIDE 25 MG/1
25 TABLET, FILM COATED ORAL 3 TIMES DAILY PRN
Status: DISCONTINUED | OUTPATIENT
Start: 2022-05-27 | End: 2022-05-31 | Stop reason: HOSPADM

## 2022-05-27 RX ORDER — METHOCARBAMOL 500 MG/1
500 TABLET, FILM COATED ORAL 4 TIMES DAILY PRN
Status: DISCONTINUED | OUTPATIENT
Start: 2022-05-27 | End: 2022-05-27

## 2022-05-27 RX ORDER — ONDANSETRON 2 MG/ML
4 INJECTION INTRAMUSCULAR; INTRAVENOUS EVERY 6 HOURS PRN
Status: DISCONTINUED | OUTPATIENT
Start: 2022-05-27 | End: 2022-05-31 | Stop reason: HOSPADM

## 2022-05-27 RX ORDER — OMEPRAZOLE 10 MG/1
20 CAPSULE, DELAYED RELEASE ORAL DAILY
Status: DISCONTINUED | OUTPATIENT
Start: 2022-05-27 | End: 2022-05-27 | Stop reason: CLARIF

## 2022-05-27 RX ORDER — ATORVASTATIN CALCIUM 10 MG/1
10 TABLET, FILM COATED ORAL AT BEDTIME
Status: DISCONTINUED | OUTPATIENT
Start: 2022-05-27 | End: 2022-05-27

## 2022-05-27 RX ORDER — PANTOPRAZOLE SODIUM 20 MG/1
40 TABLET, DELAYED RELEASE ORAL
Status: DISCONTINUED | OUTPATIENT
Start: 2022-05-27 | End: 2022-05-31 | Stop reason: HOSPADM

## 2022-05-27 RX ORDER — METHOCARBAMOL 500 MG/1
500 TABLET, FILM COATED ORAL 3 TIMES DAILY PRN
Status: DISCONTINUED | OUTPATIENT
Start: 2022-05-27 | End: 2022-05-31 | Stop reason: HOSPADM

## 2022-05-27 RX ORDER — DULOXETIN HYDROCHLORIDE 60 MG/1
60 CAPSULE, DELAYED RELEASE ORAL DAILY
Status: DISCONTINUED | OUTPATIENT
Start: 2022-05-27 | End: 2022-05-31 | Stop reason: HOSPADM

## 2022-05-27 RX ORDER — ENOXAPARIN SODIUM 100 MG/ML
40 INJECTION SUBCUTANEOUS EVERY 24 HOURS
Status: DISCONTINUED | OUTPATIENT
Start: 2022-05-27 | End: 2022-05-31 | Stop reason: HOSPADM

## 2022-05-27 RX ORDER — LORAZEPAM 2 MG/ML
0.5 INJECTION INTRAMUSCULAR ONCE
Status: COMPLETED | OUTPATIENT
Start: 2022-05-27 | End: 2022-05-27

## 2022-05-27 RX ADMIN — OXYCODONE AND ACETAMINOPHEN 1 TABLET: 5; 325 TABLET ORAL at 16:33

## 2022-05-27 RX ADMIN — ATORVASTATIN CALCIUM 40 MG: 40 TABLET, FILM COATED ORAL at 20:35

## 2022-05-27 RX ADMIN — OXYCODONE AND ACETAMINOPHEN 1 TABLET: 5; 325 TABLET ORAL at 08:26

## 2022-05-27 RX ADMIN — PREGABALIN 25 MG: 25 CAPSULE ORAL at 20:33

## 2022-05-27 RX ADMIN — HYDROXYZINE HYDROCHLORIDE 25 MG: 25 TABLET, FILM COATED ORAL at 01:17

## 2022-05-27 RX ADMIN — OXYCODONE AND ACETAMINOPHEN 1 TABLET: 5; 325 TABLET ORAL at 22:40

## 2022-05-27 RX ADMIN — FUROSEMIDE 40 MG: 40 TABLET ORAL at 16:33

## 2022-05-27 RX ADMIN — GABAPENTIN 200 MG: 100 CAPSULE ORAL at 08:26

## 2022-05-27 RX ADMIN — MORPHINE SULFATE 4 MG: 4 INJECTION, SOLUTION INTRAMUSCULAR; INTRAVENOUS at 00:11

## 2022-05-27 RX ADMIN — HYDROXYZINE HYDROCHLORIDE 25 MG: 25 TABLET, FILM COATED ORAL at 20:53

## 2022-05-27 RX ADMIN — HYDROXYZINE HYDROCHLORIDE 25 MG: 25 TABLET, FILM COATED ORAL at 08:26

## 2022-05-27 RX ADMIN — ENOXAPARIN SODIUM 40 MG: 100 INJECTION SUBCUTANEOUS at 16:32

## 2022-05-27 RX ADMIN — DULOXETINE HYDROCHLORIDE 60 MG: 60 CAPSULE, DELAYED RELEASE PELLETS ORAL at 08:31

## 2022-05-27 RX ADMIN — PREDNISONE 6 MG: 1 TABLET ORAL at 08:27

## 2022-05-27 RX ADMIN — LAMOTRIGINE 100 MG: 100 TABLET ORAL at 08:27

## 2022-05-27 RX ADMIN — EMPAGLIFLOZIN 25 MG: 25 TABLET, FILM COATED ORAL at 08:27

## 2022-05-27 RX ADMIN — PANTOPRAZOLE SODIUM 40 MG: 20 TABLET, DELAYED RELEASE ORAL at 06:52

## 2022-05-27 RX ADMIN — POLYETHYLENE GLYCOL 3350 17 G: 17 POWDER, FOR SOLUTION ORAL at 16:32

## 2022-05-27 RX ADMIN — OXYBUTYNIN 15 MG: 5 TABLET, FILM COATED, EXTENDED RELEASE ORAL at 08:27

## 2022-05-27 RX ADMIN — LAMOTRIGINE 100 MG: 100 TABLET ORAL at 20:35

## 2022-05-27 RX ADMIN — LORAZEPAM 0.5 MG: 2 INJECTION INTRAMUSCULAR; INTRAVENOUS at 02:41

## 2022-05-27 RX ADMIN — GLIPIZIDE 10 MG: 10 TABLET ORAL at 06:52

## 2022-05-27 RX ADMIN — ASPIRIN 81 MG: 81 TABLET, COATED ORAL at 16:32

## 2022-05-27 RX ADMIN — METHOCARBAMOL TABLETS 500 MG: 500 TABLET, COATED ORAL at 20:53

## 2022-05-27 ASSESSMENT — ACTIVITIES OF DAILY LIVING (ADL)
WALKING_OR_CLIMBING_STAIRS: AMBULATION DIFFICULTY, REQUIRES EQUIPMENT;AMBULATION DIFFICULTY, ASSISTANCE 1 PERSON;TRANSFERRING DIFFICULTY, ASSISTANCE 1 PERSON
BATHING: 1-->ASSISTANCE NEEDED
DRESSING/BATHING_DIFFICULTY: YES
FALL_HISTORY_WITHIN_LAST_SIX_MONTHS: YES
ADLS_ACUITY_SCORE: 46
ADLS_ACUITY_SCORE: 50
DOING_ERRANDS_INDEPENDENTLY_DIFFICULTY: YES
TRANSFERRING: 1-->ASSISTANCE (EQUIPMENT/PERSON) NEEDED (NOT DEVELOPMENTALLY APPROPRIATE)
TOILETING_ASSISTANCE: TOILETING DIFFICULTY, ASSISTANCE 1 PERSON
TOILETING_ISSUES: YES
WEAR_GLASSES_OR_BLIND: NO
NUMBER_OF_TIMES_PATIENT_HAS_FALLEN_WITHIN_LAST_SIX_MONTHS: 1
TOILETING: 1-->ASSISTANCE (EQUIPMENT/PERSON) NEEDED
WALKING_OR_CLIMBING_STAIRS_DIFFICULTY: YES
CHANGE_IN_FUNCTIONAL_STATUS_SINCE_ONSET_OF_CURRENT_ILLNESS/INJURY: NO
DRESS: 1-->ASSISTANCE (EQUIPMENT/PERSON) NEEDED (NOT DEVELOPMENTALLY APPROPRIATE)
CONCENTRATING,_REMEMBERING_OR_MAKING_DECISIONS_DIFFICULTY: NO
ADLS_ACUITY_SCORE: 50
TRANSFERRING: 1-->ASSISTANCE (EQUIPMENT/PERSON) NEEDED
ADLS_ACUITY_SCORE: 47
DRESSING/BATHING: BATHING DIFFICULTY, ASSISTANCE 1 PERSON;DRESSING DIFFICULTY, ASSISTANCE 1 PERSON
DIFFICULTY_EATING/SWALLOWING: NO
TOILETING: 1-->ASSISTANCE (EQUIPMENT/PERSON) NEEDED (NOT DEVELOPMENTALLY APPROPRIATE)
DRESS: 1-->ASSISTANCE (EQUIPMENT/PERSON) NEEDED

## 2022-05-27 NOTE — ED PROVIDER NOTES
EMERGENCY DEPARTMENT ENCOUNTER      NAME: Tonya Yang  AGE: 81 year old female  YOB: 1940  MRN: [id number]  EVALUATION DATE & TIME: 2022  8:06 PM    PCP: Ananya Mclean    ED PROVIDER: Karine Doshi D.O.      CHIEF COMPLAINT:  Chief Complaint   Patient presents with     Leg Swelling       FINAL IMPRESSION:  1. Claudication of both lower extremities (H)    2. Type 2 diabetes mellitus with hyperglycemia, unspecified whether long term insulin use (H)        ED COURSE & MEDICAL DECISION MAKIN female with history of IBS, GERD, CKD, diabetes, diabetic neuropathy, and hyperlipidemia presented to the ED for evaluation of bilateral lower leg pain and swelling.  Patient also stated that her legs have been very cold the last few days as well.  She denied any new or worsening chest pain or shortness of breath.  Upon arrival to the ED the patient was noted to be hypertensive and her initial O2 sat was 90% on room air.  With 2 L of supple oxygen her O2 sats improved into the upper 90s.  Patient was uncomfortable appearing at the time of her initial evaluation but she did not appear to be in acute distress.  On exam the patient was noted to have cool pale bilateral lower extremities and feet.  There was no palpable pulse noted in either foot although a dopplerable pulse was noted in the right foot.  No pulse was noted with the Doppler in the left foot, however.  The patient had no neurologic deficits noted in the bilateral lower extremities.  Patient also had trace edema in her bilateral feet.  The remainder of her physical exam was unremarkable.    EKG was obtained which revealed normal sinus rhythm with a prolonged QT.  There were no new or concerning ST or tube changes noted.  The patient's CBC was unremarkable or unchanged from baseline.  The basic metabolic panel, hepatic panel, troponin, BNP, and magnesium were all reassuring.      CTA of the chest abdomen pelvis with runoff was then  completed.  CTA shows a loss of the left peroneal artery in the mid calf as well as a loss of the anterior tibial artery in the left lower calf.  The radiology report notes that the posterior tibial artery likely extends from the foot although this was difficult to determine secondary to motion artifact.    The patient's case was then discussed with the on-call vascular surgeon Dr. Calles.  After discussing the case the patient will be kept overnight for further observation.  Patient will need ABIs checked.  The patient will be evaluated by vascular surgeon in the a.m.    Patient was reevaluated and informed of the CTA results.  Patient is still able to move her lower legs/toes/feet at the time of her evaluation.  There was no significant change noted in the temperature or color of either foot at the time of reevaluation.      The patient was then admitted to the hospitalist service for overnight observation after discussed the case with Dr. Urbie.     Pertinent Labs & Imaging studies reviewed. (See chart for details)  8:17 PM I met with the patient, obtained history, performed an initial exam, and discussed options and plan for diagnostics and treatment here in the ED.  11:32 PM Discussed with Dr. Calles, vascular surgery.  11:53 PM Rechecked patient. Updated patient and son on results. Discussed plan for admission - they are agreeable.  12:08 AM Discussed with Dr. Uribe, hospitalist, who accepts patient for admission.    At the conclusion of the encounter I discussed the results of all of the tests and the disposition. The questions were answered. The patient or family acknowledged understanding and was agreeable with the care plan.       PPE worn: n95 mask, goggles    MEDICATIONS GIVEN IN THE EMERGENCY:  Medications   acetaminophen (TYLENOL) tablet 325-650 mg (has no administration in time range)   atorvastatin (LIPITOR) tablet 10 mg (has no administration in time range)   DULoxetine (CYMBALTA) DR capsule 60 mg (has  no administration in time range)   gabapentin (NEURONTIN) capsule 200 mg (has no administration in time range)   glipiZIDE (GLUCOTROL) tablet 10 mg (has no administration in time range)   methocarbamol (ROBAXIN) tablet 500 mg (has no administration in time range)   oxyCODONE-acetaminophen (PERCOCET) 5-325 MG per tablet 1 tablet (has no administration in time range)   predniSONE (DELTASONE) tablet 6 mg (has no administration in time range)   pregabalin (LYRICA) capsule 25 mg (has no administration in time range)   oxybutynin ER (DITROPAN XL) 24 hr tablet 15 mg (has no administration in time range)   miconazole (MICATIN) 2 % powder (has no administration in time range)   lamoTRIgine (LaMICtal) tablet 100 mg (has no administration in time range)   empagliflozin (JARDIANCE) tablet 25 mg (has no administration in time range)   hydrOXYzine (ATARAX) tablet 25 mg (25 mg Oral Given 5/27/22 0117)   diazepam (VALIUM) solution 0.5 mg (has no administration in time range)   melatonin tablet 1 mg (has no administration in time range)   ondansetron (ZOFRAN ODT) ODT tab 4 mg (has no administration in time range)     Or   ondansetron (ZOFRAN) injection 4 mg (has no administration in time range)   pantoprazole (PROTONIX) EC tablet 40 mg (has no administration in time range)   morphine (PF) injection 2 mg (has no administration in time range)   LORazepam (ATIVAN) injection 0.5 mg (has no administration in time range)   iopamidol (ISOVUE-370) solution 100 mL (100 mLs Intravenous Given 5/26/22 2202)   morphine (PF) injection 4 mg (4 mg Intravenous Given 5/27/22 0011)       NEW PRESCRIPTIONS STARTED AT TODAY'S ER VISIT:  New Prescriptions    No medications on file     =================================================================    HPI  Patient information was obtained from: Patient, son    Use of : N/A    Tonya Yang is a 81 year old female with a pertinent history of Meniere's disease, type II DM, stage 3 CKD, HLD, HTN,  COPD, who presents to this ED by EMS for evaluation of bilateral leg swelling. Patient reports bilateral leg swelling that has been ongoing for several weeks, but has worsened over the last week. Patient states the swelling has been ongoing since her hospitalization here in April, per chart review was discharged on 4/15/22. Over the last 2 days patient's legs have been cold. She describes it as feeling like they are in snow. She has been wearing multiple pairs of socks and had a blanket over her feet without relief. Son states patient called her PCP today who was concerned about potential blood clots, which prompted patient to present to ED tonshawn. Son mentions that patient had been taking Lasix after her previous hospitalizations, however patient has some difficulties with urinary incontinence and lives alone, so she is unable to take it daily.    Son reports patient also has a history of spinal stenosis and has been in a wheelchair over the last 2 months. Patient has been complaining of intermittent shooting pains in her legs ongoing for some time. Patient states that she had been using an ice pack on her legs with some relief. Patient also endorses some numbness and tingling in her legs and left arm, which she attributes to neuropathy.     Of note, patient's O2 saturation was 90% on room air on arrival. Placed on 3L nasal canula with improvement to 98%. Patient states that her home health nurse who comes by once weekly has mentioned to her that her O2 saturation is occasionally low, but patient is not on home O2. She denies any chest pain or shortness of breath.      I, Stewart Hardy am serving as a scribe to document services personally performed by Dr. Karine Doshi DO, based on my observation and the provider's statements to me. I, Dr. Karine Doshi DO attest that Stewart Hardy is acting in a scribe capacity, has observed my performance of the services and has documented them in accordance with my  direction.      REVIEW OF SYSTEMS  Review of Systems   Respiratory: Negative for shortness of breath.    Cardiovascular: Positive for leg swelling. Negative for chest pain.   Musculoskeletal:        Positive for bilateral leg coldness, intermittent leg pain.    Neurological: Positive for numbness (numbness and tingling intermittent in extremities).   All other systems reviewed and are negative.      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     Hepatic abscess      Hyperlipidemia      Hypertension      Irritable bowel syndrome      Meniere disease      Neuropathy      Noninfectious ileitis      Peritoneal abscess (H)      Spinal stenosis of lumbar region      Steroid long-term use      Vaginitis, atrophic, postmenopausal      Vitamin D deficiency        PAST SURGICAL HISTORY:  Past Surgical History:   Procedure Laterality Date     CATARACT IOL, RT/LT Left 7/2013     FOOT SURGERY      toe     GI SURGERY       IR LUMBAR/SACRAL TRANSFOR INJ BILATERAL Bilateral 3/11/2022     LAPAROSCOPIC HEPATECTOMY PARTIAL  11/4/2013    Procedure: LAPAROSCOPIC HEPATECTOMY PARTIAL;  Laparoscopic Debridement of Liver Abcess;  Surgeon: Sepideh Green MD;  Location: [facility] OR     ORTHOPEDIC SURGERY       PICC INSERTION  8/28/2013    5fr DL Power PICC, 41cm (1cm external length) in the R lateral brachial vein with tip in the SVC RA junction.     RECTAL SURGERY      1970s     VASCULAR SURGERY         CURRENT MEDICATIONS:    Prior to Admission medications    Medication Sig Start Date End Date Taking? Authorizing Provider   acetaminophen  (TYLENOL) 325 MG tablet Take 1-2 tablets (325-650 mg) by mouth every 6 hours as needed for mild pain 3/12/22   Venita Cano APRN CNP   atorvastatin (LIPITOR) 10 MG tablet Take 10 mg by mouth At Bedtime    Unknown, Entered By History   blood glucose (ACCU-CHEK FASTCLIX) lancing device FOR TESTING ONCE DAILY. DX  E11.9 TYPE 2 DIABETES 9/2/20   Reported, Patient   blood glucose (CONTOUR TEST) test strip TESTING EVERY DAY DX  E11.9 11/23/20   Reported, Patient   carvedilol (COREG) 3.125 MG tablet Take 1 tablet (3.125 mg) by mouth 2 times daily (with meals) 3/12/22   Hayder Mishra MD   CONTOUR NEXT TEST test strip TESTING EVERY DAY DX  E11.9 6/24/21   Reported, Patient   cyanocobalamin 1000 MCG SUBL Place 1,000 mcg under the tongue daily    Unknown, Entered By History   diclofenac (VOLTAREN) 1 % topical gel Apply 4 g topically 4 times daily 4/15/22   Rebecca Sutton MD   DULoxetine (CYMBALTA) 60 MG EC capsule TAKE 1 CAPSULE (60 MG) BY MOUTH DAILY 11/17/17   Bethanie Finnegan MD   ferrous sulfate (FEROSUL) 325 (65 Fe) MG tablet Take 1 tablet (325 mg) by mouth daily 4/15/22   Ml López MD   furosemide (LASIX) 20 MG tablet Take 1 tablet (20 mg) by mouth every evening 2 pm 4/15/22   Rebecca Sutton MD   gabapentin (NEURONTIN) 100 MG capsule Take 100 mg by mouth 4 times daily    Unknown, Entered By History   glipiZIDE (GLUCOTROL) 10 MG tablet Take 1 tablet (10 mg) by mouth 2 times daily (before meals) 3/12/22   Hayder Mishra MD   hydrOXYzine (ATARAX) 25 MG tablet Take 25 mg by mouth 3 times daily as needed for anxiety    Unknown, Entered By History   insulin aspart (NOVOLOG PEN) 100 UNIT/ML pen Correction Scale - MEDIUM INSULIN RESISTANCE DOSING     Do Not give Correction Insulin if Pre-Meal BG less than 140.   For Pre-Meal  - 189 give 1 unit.   For Pre-Meal  - 239 give 2 units.   For Pre-Meal  - 289 give 3 units.   For Pre-Meal  - 339 give 4 units.   For  Pre-Meal - 399 give 5 units.   For Pre-Meal -449 give 6 units  For Pre-Meal BG greater than or equal to 450 give 7 units.   To be given with prandial insulin, and based on pre-meal blood glucose.    Notify provider if glucose greater than or equal to 350 mg/dL after administration of correction dose. 4/14/22   Ml López MD   JARDIANCE 25 MG TABS tablet Take 25 mg by mouth daily  7/6/21   Reported, Patient   lamoTRIgine (LAMICTAL) 100 MG tablet Take 100 mg by mouth 2 times daily    Unknown, Entered By History   lisinopril (ZESTRIL) 5 MG tablet Take 5 mg by mouth daily 4/1/22   Osmin Omalley APRN CNP   melatonin 1 MG TABS tablet Take 1 tablet (1 mg) by mouth nightly as needed for sleep 4/12/22   Ml López MD   metFORMIN (GLUCOPHAGE) 500 MG tablet Take 500 mg by mouth 2 times daily (with meals)    Unknown, Entered By History   methocarbamol (ROBAXIN) 500 MG tablet Take 1 tablet (500 mg) by mouth every 12 hours as needed for muscle spasms 4/15/22   Rebecca Sutton MD   miconazole (MICATIN) 2 % external powder Apply topically 2 times daily To the skin fold 4/12/22   Ml López MD   mineral oil-hydrophilic petrolatum (AQUAPHOR) external ointment Apply topically 3 times daily To the dry skin 4/12/22   Ml López MD   mirabegron (MYRBETRIQ) 25 MG 24 hr tablet Take 25 mg by mouth At Bedtime  4/4/22   Osmin Omalley APRN CNP   omeprazole (PRILOSEC) 20 MG DR capsule Take 20 mg by mouth daily     Unknown, Entered By History   order for DME Equipment being ordered: wrist brace 5/31/20   Leah Feliz PA-C   oxyCODONE 7.5 MG TABS Take 7.5 mg by mouth every 6 hours as needed for moderate to severe pain 4/15/22   Rebecca Sutton MD   oxyCODONE-acetaminophen (PERCOCET) 5-325 MG tablet Take 1 tablet by mouth every 6 hours as needed for severe pain (Max 4 tablets daily)    Unknown, Entered By History   polyethylene glycol (MIRALAX) 17 GM/Dose powder Take 17 g by mouth daily  4/15/22   Rebecca Sutton MD   predniSONE (DELTASONE) 1 MG tablet Take 6 mg by mouth daily     Unknown, Entered By History   pregabalin (LYRICA) 25 MG capsule Take 1 capsule (25 mg) by mouth At Bedtime 4/15/22   Rebecca Sutton MD   senna-docusate (SENOKOT-S/PERICOLACE) 8.6-50 MG tablet Take 1 tablet by mouth 2 times daily 4/15/22   Rebecca Sutton MD   triamcinolone (KENALOG) 0.1 % external cream Apply topically 2 times daily as needed     Reported, Patient   triamterene-HCTZ (MAXZIDE-25) 37.5-25 MG tablet TAKE 1 CAPSULE BY MOUTH EVERY DAY INSTEAD OF CHLORTHALIDONE    Unknown, Entered By History   vitamin D3 (CHOLECALCIFEROL) 50 mcg (2000 units) tablet Take 3 tablets by mouth daily    Unknown, Entered By History   amLODIPine (NORVASC) 10 MG tablet Take 1 tablet (10 mg) by mouth daily 3/13/22 4/14/22  Hayder Mishra MD       ALLERGIES:  Allergies   Allergen Reactions     Propofol Nausea and Vomiting     Shellfish Allergy      Other reaction(s): Dizziness     Capsaicin Rash and Blisters       FAMILY HISTORY:  Family History   Problem Relation Age of Onset     Cerebrovascular Disease Mother      Heart Disease Mother      Heart Disease Father      Heart Disease Brother      Diabetes No family hx of      Coronary Artery Disease No family hx of      Hypertension No family hx of      Hyperlipidemia No family hx of      Breast Cancer No family hx of      Colon Cancer No family hx of      Prostate Cancer No family hx of      Other Cancer No family hx of      Depression No family hx of      Anxiety Disorder No family hx of      Mental Illness No family hx of      Substance Abuse No family hx of      Anesthesia Reaction No family hx of      Asthma No family hx of      Osteoporosis No family hx of      Genetic Disorder No family hx of      Thyroid Disease No family hx of      Obesity No family hx of      Unknown/Adopted No family hx of        SOCIAL HISTORY:   Social History     Socioeconomic History     Marital status:  "   Tobacco Use     Smoking status: Former Smoker     Types: Cigarettes     Quit date: 11/15/1987     Years since quittin.5     Smokeless tobacco: Former User   Substance and Sexual Activity     Alcohol use: Not Currently     Alcohol/week: 0.0 standard drinks     Comment: MALISSA -paco since      Drug use: No     Comment: used marijuanna in the past     Sexual activity: Never     Partners: Male   Other Topics Concern     Parent/sibling w/ CABG, MI or angioplasty before 65F 55M? No       VITALS:  BP (!) 168/74 (BP Location: Left arm, Patient Position: Semi-Jarquin's)   Pulse 73   Temp 97.7  F (36.5  C) (Oral)   Resp 18   Ht 1.651 m (5' 5\")   Wt 81.6 kg (180 lb)   LMP  (LMP Unknown)   SpO2 98%   BMI 29.95 kg/m      PHYSICAL EXAM    General presentation: Alert, Vital signs reviewed. Uncomfortable and anxious appearing.  HENT: ENT inspection is normal. Oropharynx is moist and clear.   Eye: Pupils are equal and reactive to light. EOMI  Neck: The neck is supple, with full ROM, with no evidence of meningismus.  Pulmonary: Currently in no acute respiratory distress. Normal, non labored respirations. Slightly diminished breath sounds bilaterally. Clear to auscultation bilaterally.   Circulatory: Regular rate and rhythm. No murmurs, rubs, or gallops.   Abdominal: The abdomen is soft. Nontender. No rigidity, guarding, or rebound. Bowel sounds normal.   Neurologic: Alert, oriented to person, place, and time. No motor deficit. No sensory deficit. Cranial nerves II through XII are intact.  Musculoskeletal: Full range of motion in all extremities. Bilateral lower extremities are cold to touch. Lower legs and feet are slightly pale appearing. No palpable pulse in either foot. DP pulse on right noted with doppler. No pulse noted on left with doppler. Trace edema in bilateral feet. Lower legs mildly tender to palpation diffusely.  Skin: Skin color is normal. No rash. Dry to touch.      LAB:  All pertinent labs " reviewed and interpreted.  Results for orders placed or performed during the hospital encounter of 05/26/22   CTA Chest Abdomen Pelvis Runoff w Contrast    Impression    IMPRESSION:    1. There is some loss of detail given patient motion especially in the lower extremities.    2. CTA of the left lower extremity shows loss of the peroneal artery in the midcalf region with no significant reconstitution. There is loss of the anterior tibial artery in the lower calf region. I believe the posterior tibial artery extend into the   foot although this is difficult to determine given motion. There is soft tissue edema seen in both lower extremities greatest in the left lower extremity.    3.There is localized narrowing seen involving the hepatic flexure of the colon this likely represents an area of spasm since there is no evidence for stranding of the adjacent fat to suggest colitis.    Result Value Ref Range    INR 1.01 0.85 - 1.15   Partial thromboplastin time   Result Value Ref Range    aPTT 29 22 - 38 Seconds   Comprehensive metabolic panel   Result Value Ref Range    Sodium 143 136 - 145 mmol/L    Potassium 3.7 3.5 - 5.0 mmol/L    Chloride 103 98 - 107 mmol/L    Carbon Dioxide (CO2) 26 22 - 31 mmol/L    Anion Gap 14 5 - 18 mmol/L    Urea Nitrogen 21 8 - 28 mg/dL    Creatinine 0.83 0.60 - 1.10 mg/dL    Calcium 10.2 8.5 - 10.5 mg/dL    Glucose 125 70 - 125 mg/dL    Alkaline Phosphatase 77 45 - 120 U/L    AST 14 0 - 40 U/L    ALT 17 0 - 45 U/L    Protein Total 6.8 6.0 - 8.0 g/dL    Albumin 3.8 3.5 - 5.0 g/dL    Bilirubin Total 0.3 0.0 - 1.0 mg/dL    GFR Estimate 71 >60 mL/min/1.73m2   Result Value Ref Range    Magnesium 1.8 1.8 - 2.6 mg/dL   Result Value Ref Range    Troponin I <0.01 0.00 - 0.29 ng/mL   B-Type Natriuretic Peptide ([facility] Only)   Result Value Ref Range    BNP 56 0 - 151 pg/mL   CBC with platelets and differential   Result Value Ref Range    WBC Count 9.5 4.0 - 11.0 10e3/uL    RBC Count 4.50 3.80 - 5.20  10e6/uL    Hemoglobin 10.3 (L) 11.7 - 15.7 g/dL    Hematocrit 36.6 35.0 - 47.0 %    MCV 81 78 - 100 fL    MCH 22.9 (L) 26.5 - 33.0 pg    MCHC 28.1 (L) 31.5 - 36.5 g/dL    RDW 16.7 (H) 10.0 - 15.0 %    Platelet Count 385 150 - 450 10e3/uL    % Neutrophils 62 %    % Lymphocytes 29 %    % Monocytes 8 %    % Eosinophils 1 %    % Basophils 0 %    % Immature Granulocytes 0 %    NRBCs per 100 WBC 0 <1 /100    Absolute Neutrophils 5.8 1.6 - 8.3 10e3/uL    Absolute Lymphocytes 2.7 0.8 - 5.3 10e3/uL    Absolute Monocytes 0.8 0.0 - 1.3 10e3/uL    Absolute Eosinophils 0.1 0.0 - 0.7 10e3/uL    Absolute Basophils 0.0 0.0 - 0.2 10e3/uL    Absolute Immature Granulocytes 0.0 <=0.4 10e3/uL    Absolute NRBCs 0.0 10e3/uL   Asymptomatic COVID-19 Virus (Coronavirus) by PCR Nasopharyngeal    Specimen: Nasopharyngeal; Swab   Result Value Ref Range    SARS CoV2 PCR Negative Negative       RADIOLOGY:  Reviewed all pertinent imaging. Please see official radiology reports  CTA Chest Abdomen Pelvis Runoff w Contrast   Final Result   IMPRESSION:      1. There is some loss of detail given patient motion especially in the lower extremities.      2. CTA of the left lower extremity shows loss of the peroneal artery in the midcalf region with no significant reconstitution. There is loss of the anterior tibial artery in the lower calf region. I believe the posterior tibial artery extend into the    foot although this is difficult to determine given motion. There is soft tissue edema seen in both lower extremities greatest in the left lower extremity.      3.There is localized narrowing seen involving the hepatic flexure of the colon this likely represents an area of spasm since there is no evidence for stranding of the adjacent fat to suggest colitis.       US JOAQUIN Doppler No Exercise 1-2 Levels Bilateral    (Results Pending)       EKG:    Normal sinus rhythm.  Rate of 80.  Right bundle-branch block.  Prolonged QT.  Nonspecific ST segment changes  noted.  Compared to the EKG on 4/22/2022 the QT has shortened but there are no other significant changes noted.    I have independently reviewed and interpreted the EKG(s) documented above.      I, Stewart Hardy, am serving as a scribe to document services personally performed by Dr. Karine Doshi based on my observation and the provider's statements to me. I, Karine Doshi, DO attest that Stewart Hardy is acting in a scribe capacity, has observed my performance of the services and has documented them in accordance with my direction.    Karine Doshi DO  Emergency Medicine  [facility] EMERGENCY ROOM  [facility] [facility] [address] [phone]     Karine Doshi DO  05/27/22 0322

## 2022-05-27 NOTE — H&P
"Hospital Medicine Service History and Physical  [facility]    Tonya Yang is a 81 year old female who  has a past medical history of Abdominal pain, Anxiety, Arthritis, Asthma, Bipolar 1 disorder (H), Chronic pain, Cochlear hydrops (1988), COPD (chronic obstructive pulmonary disease) (H), Depression, Diabetes mellitus (H), Dyspepsia, GERD (gastroesophageal reflux disease), Hard of hearing, Hepatic abscess, Hyperlipidemia, Hypertension, Irritable bowel syndrome, Meniere disease, Neuropathy, Noninfectious ileitis, Peritoneal abscess (H), Spinal stenosis of lumbar region, Steroid long-term use, Vaginitis, atrophic, postmenopausal, and Vitamin D deficiency.   Chief Complaint: leg swelling bilaterally    Assessment and Plan  Occlusion of left peroneal artery  Lower limb ischemia?  No ulcers  Vascular surgery consulted  JOAQUIN's ordered  PT/OT ordered, she has refused TCU and left AMA in the past  As needed IV morphine for pain    CKD  Creatinine normal in the ED    Type 2 diabetes with neuropathy  Continue PTA medications  Got contrast, hold metformin  On prednisone daily for her hearing loss  Currently normoglycemic    Mood disorder  Confirms that she is taking Lamictal  Can resume at home dose    Normocytic anemia  Iron deficiency anemia    DVTP: Anticipate short stay  Code Status: Prior full  Disposition: Observation   Estimated body mass index is 29.95 kg/m  as calculated from the following:    Height as of this encounter: 1.651 m (5' 5\").    Weight as of this encounter: 81.6 kg (180 lb).    History of Present Illness  Tonya Yang presents because she has had cold legs which has been worse over the past few days.  States her symptoms actually started in the middle of April she has also noticed increased swelling.  She has numbness and tingling bilaterally that is currently worse on the right.  She has a known diagnosis of severe spinal stenosis  Doesn't take lasx due to incontinence     She " was admitted here in March for severe sepsis secondary to pneumonia and was in the hospital for 5 days.  Over that time she received a steroid injection for her low back pain.  She was discharged to a TCU but returned after a fall.  She was admitted again for an additional week at  in April and was discharged again to TCU.  She left TCU AMA same day and went to an OSH ED where she was briefly admitted after a fall that took place at home after leaving AMA. She was seen by PT and recommended TCU which she refused.    ROS + numbness, weakness, edema, falls, cold feet  ROS - cp, sob, abd pain  All other systems reviewed and are negative    In the ED, patient is afebrile, somewhat hypertensive, was hypoxic requiring 3 L nasal cannula.  Metabolic panel fairly normal.  Troponin negative, normal BNP, normal LFTs.  Hemoglobin 10.3, no leukocytosis, platelets normal.  CT abdomen pelvis with runoff limited by motion artifact, shows loss of left peroneal artery in the mid calf and loss of anterior tibial artery as well as soft tissue edema.  Received morphine.  There were unable to obtain Doppler pulses on the left foot.  EDMD discussed with vascular surgery who wanted to check ABIs and plan to see her in the morning. Did not recommend anticoagulation or antiplatelet.    Physical exam:  Appears nad in the bed  Anicteric conjunctiva, PERRL  dry mucous membranes, poor dentition, [place]  Trachea midline, no jvd  cta, Respiratory effort normal on RA  rrr, no murmur  Abdomen soft, nondistended, nontender  B/l pitting edema, clubbing of nails absent  Skin normal temperature, cold, slightly dusky appearing feet  She is not hanging feet off the bed  Atypical with labile affect, alert  Vital signs reviewed by me    Wt Readings from Last 4 Encounters:   05/26/22 81.6 kg (180 lb)   05/24/22 89.8 kg (198 lb)   04/22/22 89.8 kg (198 lb)   04/15/22 89.9 kg (198 lb 4.8 oz)        reports that she quit smoking about 34 years ago. Her smoking  use included cigarettes. She has quit using smokeless tobacco. She reports previous alcohol use. She reports that she does not use drugs.  family history includes Cerebrovascular Disease in her mother; Heart Disease in her brother, father, and mother.   has a past surgical history that includes Rectal surgery; Foot surgery; picc insertion (8/28/2013); GI surgery; orthopedic surgery; vascular surgery; Laparoscopic hepatectomy partial (11/4/2013); cataract iol, rt/lt (Left, 7/2013); and IR Lumbar Sacral Transfor Injection Bilateral (Bilateral, 3/11/2022).   Allergies   Allergen Reactions     Propofol Nausea and Vomiting     Shellfish Allergy      Other reaction(s): Dizziness     Capsaicin Rash and Blisters       Donald Uribe MD, MPH  [facility]   Phone: #[phone]

## 2022-05-27 NOTE — PROGRESS NOTES
"PRIMARY DIAGNOSIS: \"GENERIC\" NURSING  OUTPATIENT/OBSERVATION GOALS TO BE MET BEFORE DISCHARGE:  1. ADLs back to baseline: No    2. Activity and level of assistance: Bedrest at this time.    3. Pain status: Improved but still requiring IV narcotics.    4. Return to near baseline physical activity: No     Discharge Planner Nurse   Safe discharge environment identified: No  Barriers to discharge: Yes       Entered by: Bridgett Hudson RN 05/27/2022 4:26 AM     Please review provider order for any additional goals.   Nurse to notify provider when observation goals have been met and patient is ready for discharge.  "

## 2022-05-27 NOTE — CONSULTS
VASCULAR SURGERY  CONSULTATION    VASCULAR SURGEON: Gemma Jacobo MD, RPVI     LOCATION:  [facility]    Tonya Yang   Medical Record #:  [id number]  YOB: 1940  Age:  81 year old     Date of Service: 5/26/2022    PRIMARY CARE PROVIDER: Ananya Mclean      Reason for visit: Concern for left lower extremity ischemia    IMPRESSION: 81-year-old female who was admitted to the hospital with concern of significant pain and decline in motor function in bilateral lower extremities along with persistent edema.  Patient has a history of spinal stenosis with subsequent steroid injections, anxiety, arthritis, asthma, bipolar 1 disorder, COPD, diabetes.  Most recent ABIs did show mild disease in bilateral lower extremities.  CT scan did not show obvious arterial occlusion but the quality of the CT scan is suboptimal due to poor timing; there might be some chronic tibial disease most likely related to prolonged diabetes.  Physically there is no evidence of tissue loss or chronic limb threatening ischemia.  On my exam there are excellent Doppler signals bilaterally with multiphasic flow.    RECOMMENDATION/RISKS: Would not recommend any surgical or endovascular intervention since patient's symptoms do not related to vascular etiology.  Concern for neurogenic component of patient's symptoms probably related to chronic spinal stenosis.  Patient does have progressive motor function decline with neurogenic pain associated with it.  ABIs are almost normal so I do not anticipate that patient will require any intervention given her symptoms.  Please call vascular surgery for any further questions.    HPI:  Tonya Yang is a 81 year old female who was seen today in consultation for possible peripheral arterial disease in bilateral lower extremities.  Patient was admitted to the hospital for progressive decline in motor function bilateral lower extremities.  Patient stated that it started several  months ago when she started using a cane and progressively declined.  Now she is in wheelchair most of the time but still can ambulate.  Also mitts to neurogenic pain in bilateral lower extremities.  Denies any classic symptoms of claudication or vascular pain.  Denies any tissue loss.    REVIEW OF SYSTEMS:    A 12 point ROS was reviewed and is negative except for bilateral lower extremity weakness    PHH:    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     Hepatic abscess      Hyperlipidemia      Hypertension      Irritable bowel syndrome      Meniere disease      Neuropathy      Noninfectious ileitis      Peritoneal abscess (H)      Spinal stenosis of lumbar region      Steroid long-term use      Vaginitis, atrophic, postmenopausal      Vitamin D deficiency           Past Surgical History:   Procedure Laterality Date     CATARACT IOL, RT/LT Left 7/2013     FOOT SURGERY      toe     GI SURGERY       IR LUMBAR/SACRAL TRANSFOR INJ BILATERAL Bilateral 3/11/2022     LAPAROSCOPIC HEPATECTOMY PARTIAL  11/4/2013    Procedure: LAPAROSCOPIC HEPATECTOMY PARTIAL;  Laparoscopic Debridement of Liver Abcess;  Surgeon: Sepideh Green MD;  Location: [facility] OR     ORTHOPEDIC SURGERY       PICC INSERTION  8/28/2013    5fr DL Power PICC, 41cm (1cm external length) in the R lateral brachial vein with tip in the SVC RA junction.     RECTAL SURGERY      1970s     VASCULAR SURGERY         ALLERGIES:  Propofol, Shellfish allergy, and Capsaicin    MEDS:    Current Facility-Administered Medications:      acetaminophen (TYLENOL) tablet 325-650 mg, 325-650 mg, Oral,  Q6H PRN, Donald Uribe MD     aspirin EC tablet 81 mg, 81 mg, Oral, Daily, Rebecca Sutton MD, 81 mg at 05/27/22 1632     atorvastatin (LIPITOR) tablet 40 mg, 40 mg, Oral, At Bedtime, Rebecca Sutton MD     glucose gel 15-30 g, 15-30 g, Oral, Q15 Min PRN **OR** dextrose 50 % injection 25-50 mL, 25-50 mL, Intravenous, Q15 Min PRN **OR** glucagon injection 1 mg, 1 mg, Subcutaneous, Q15 Min PRN, Rebecca Sutton MD     diazepam (VALIUM) solution 0.3 mg, 0.3 mg, Oral, BID PRN, Rebecca Sutotn MD     diclofenac (VOLTAREN) 1 % topical gel 4 g, 4 g, Topical, 4x Daily PRN, Rebecca Sutton MD     DULoxetine (CYMBALTA) DR capsule 60 mg, 60 mg, Oral, Daily, Donald Uribe MD, 60 mg at 05/27/22 0831     empagliflozin (JARDIANCE) tablet 25 mg, 25 mg, Oral, Daily, Donald Uribe MD, 25 mg at 05/27/22 0827     enoxaparin ANTICOAGULANT (LOVENOX) injection 40 mg, 40 mg, Subcutaneous, Q24H, Rebecca Sutton MD, 40 mg at 05/27/22 1632     furosemide (LASIX) tablet 40 mg, 40 mg, Oral, Daily, Rebecca Sutton MD, 40 mg at 05/27/22 1633     glipiZIDE (GLUCOTROL) tablet 10 mg, 10 mg, Oral, BID AC, Donald Uribe MD, 10 mg at 05/27/22 0652     hydrOXYzine (ATARAX) tablet 25 mg, 25 mg, Oral, TID PRN, Donald Uribe MD, 25 mg at 05/27/22 0826     insulin aspart (NovoLOG) injection (RAPID ACTING), 1-3 Units, Subcutaneous, TID AC, Rebecca Sutton MD     insulin aspart (NovoLOG) injection (RAPID ACTING), 1-3 Units, Subcutaneous, At Bedtime, Rebecca Sutton MD     lamoTRIgine (LaMICtal) tablet 100 mg, 100 mg, Oral, BID, Donald Uribe MD, 100 mg at 05/27/22 0827     melatonin tablet 1 mg, 1 mg, Oral, At Bedtime PRN, Donald Uribe MD     methocarbamol (ROBAXIN) tablet 500 mg, 500 mg, Oral, TID PRN, Rebecca Sutton MD     miconazole (MICATIN) 2 % powder, , Topical, BID PRN, Donald Uribe MD     ondansetron (ZOFRAN ODT) ODT tab 4 mg, 4 mg, Oral, Q6H PRN **OR** ondansetron (ZOFRAN) injection 4 mg, 4 mg, Intravenous, Q6H PRN,  Donald Uribe MD     oxybutynin ER (DITROPAN XL) 24 hr tablet 15 mg, 15 mg, Oral, Daily, Donald Uribe MD, 15 mg at 05/27/22 0827     oxyCODONE-acetaminophen (PERCOCET) 5-325 MG per tablet 1 tablet, 1 tablet, Oral, Q6H PRN, Donald Uribe MD, 1 tablet at 05/27/22 1633     pantoprazole (PROTONIX) EC tablet 40 mg, 40 mg, Oral, QAM AC, Donald Uribe MD, 40 mg at 05/27/22 0652     polyethylene glycol (MIRALAX) Packet 17 g, 17 g, Oral, Daily, Rebecca Sutton MD, 17 g at 05/27/22 1632     predniSONE (DELTASONE) tablet 6 mg, 6 mg, Oral, Daily, Donald Uribe MD, 6 mg at 05/27/22 0827     pregabalin (LYRICA) capsule 25 mg, 25 mg, Oral, BID, Rebecca Sutton MD    SOCIAL HABITS:    History   Smoking Status     Former Smoker     Types: Cigarettes     Quit date: 11/15/1987   Smokeless Tobacco     Former User     Social History    Substance and Sexual Activity      Alcohol use: Not Currently        Alcohol/week: 0.0 standard drinks        Comment: MALISSA white since 9/131986      History   Drug Use No     Comment: used marijuanna in the past       FAMILY HISTORY:    Family History   Problem Relation Age of Onset     Cerebrovascular Disease Mother      Heart Disease Mother      Heart Disease Father      Heart Disease Brother      Diabetes No family hx of      Coronary Artery Disease No family hx of      Hypertension No family hx of      Hyperlipidemia No family hx of      Breast Cancer No family hx of      Colon Cancer No family hx of      Prostate Cancer No family hx of      Other Cancer No family hx of      Depression No family hx of      Anxiety Disorder No family hx of      Mental Illness No family hx of      Substance Abuse No family hx of      Anesthesia Reaction No family hx of      Asthma No family hx of      Osteoporosis No family hx of      Genetic Disorder No family hx of      Thyroid Disease No family hx of      Obesity No family hx of      Unknown/Adopted No family hx of        PE:  BP (!) 164/74  "(BP Location: Left arm)   Pulse 90   Temp 98.2  F (36.8  C) (Oral)   Resp 20   Ht 1.651 m (5' 5\")   Wt 81.6 kg (180 lb)   LMP  (LMP Unknown)   SpO2 93%   BMI 29.95 kg/m    Wt Readings from Last 1 Encounters:   05/26/22 81.6 kg (180 lb)     Body mass index is 29.95 kg/m .    EXAM:  GENERAL: This is a well-developed 81 year old female who appears her stated age  EYES: Grossly normal.  MOUTH: Buccal mucosa normal   CARDIAC: Normal   CHEST/LUNG: Clear to auscultation bilaterally  GASTROINTESINAL soft nontender nondistended  MUSCULOSKELETAL: Grossly normal and both lower extremities are intact.  HEME/LYMPH: No lymphedema  NEUROLOGIC: Focally intact, Alert and oriented x 3.  Significant decline in motor function bilateral lower extremities.  Right is worse than left  PSYCH: appropriate affect  INTEGUMENT: No open lesions or ulcers  Pulse Exam: Multiphasic signals present bilaterally.          DIAGNOSTIC STUDIES:     Images:  US JOAQUIN Doppler No Exercise 1-2 Levels Bilateral    Result Date: 5/27/2022  EXAM: RESTING ANKLE-BRACHIAL INDICES (ABIs) LOCATION: [facility] DATE/TIME: 5/27/2022 7:12 AM INDICATION: PAD. COMPARISON: None. JOAQUIN FINDINGS: RIGHT Brachial: 176 Ankle (PT): 157 Index: 0.87 Ankle (DP): 161 Index: 0.89 Digit: 73 Index: 0.41 LEFT Brachial: 180 Ankle (PT): 172 Index: 0.96 Ankle (DP): 186 Index: 1.0 Digit: 46 Index: 0.26 The right JOAQUIN at rest is 0.89. The left JOAQUIN at rest is 1.0.      IMPRESSION: 1.  RIGHT LOWER EXTREMITY: JOAQUIN at rest is borderline abnormal. Right digital index is decreased however digital pressures are adequate for healing potential. 2.  LEFT LOWER EXTREMITY: JOAQUIN at rest is normal. Left digital index is decreased however digital pressures are adequate for healing potential.    CTA Chest Abdomen Pelvis Runoff w Contrast    Result Date: 5/26/2022  [facility] CT OF THE CHEST, ABDOMEN, PELVIS, AND BILATERAL LOWER EXTREMITIES USING CTA TECHNIQUE " WITH RUNOFF. 5/26/2022 10:00 PM INDICATION: cold, pale bilateral feet, dimished pulse in right foot. no pulse in left foot TECHNIQUE: Noncontrast. Axial, sagittal and coronal thin-section reconstruction. Dose reduction techniques were used. COMPARISON: None. FINDINGS: CTA examination of the chest demonstrates mild calcification of the thoracic aorta with no evidence for significant stenosis, aneurysmal dilatation, dissection, or intraluminal clot. CTA examination of the abdomen demonstrates mild vascular calcification involving the abdominal aorta with no evidence for significant stenosis, aneurysmal dilatation, dissection, or intraluminal clot identified. CTA examination of the pelvis demonstrates mild vascular calcification with no evidence for significant stenosis, aneurysmal dilatation, dissection, or intraluminal clot. There is good flow extending into both groins. CTA examination of the right lower extremity shows the catheter vessels to be small in size, but are patent with three-vessel flow to the ankle and two-vessel flow into the foot via the tibial arteries. CTA of the left lower extremity shows loss of the peroneal artery in the midcalf region with no significant reconstitution. There is loss of the anterior tibial artery in the lower calf region. I believe the posterior tibial artery extend into the foot although this is difficult to determine given motion. CT of the chest demonstrates mild to moderate calcification most prominent in the LAD. There is slight dependent atelectasis seen in both lower lobes. There are minimal findings of centrilobular emphysema seen in both lungs. CT examination of the abdomen demonstrates fatty infiltration of liver. The gallbladder and bile ducts are normal in caliber. The pancreas is normal. The spleen is normal. The adrenals are normal. There are scattered benign simple cysts seen in both kidneys with no follow-up recommended. The bowel demonstrates mild findings of  obstipation in the colon. There is localized narrowing seen involving the hepatic flexure of the colon this likely represents an area of spasm since there is no evidence for stranding of the adjacent fat to suggest colitis. There are moderate findings of diverticulosis with no evidence for diverticulitis. The pelvic organs demonstrates a calcification in the atrophic uterus most typical for some fibroids. The pelvic organs are otherwise normal. The musculoskeletal region demonstrates a small fatty umbilical hernia. There are some scattered injection granulomas in the buttocks. Significant degenerative changes are seen throughout the spine to include degenerative disc disease at the L4 and L5 levels.     IMPRESSION: 1. There is some loss of detail given patient motion especially in the lower extremities. 2. CTA of the left lower extremity shows loss of the peroneal artery in the midcalf region with no significant reconstitution. There is loss of the anterior tibial artery in the lower calf region. I believe the posterior tibial artery extend into the foot although this is difficult to determine given motion. There is soft tissue edema seen in both lower extremities greatest in the left lower extremity. 3.There is localized narrowing seen involving the hepatic flexure of the colon this likely represents an area of spasm since there is no evidence for stranding of the adjacent fat to suggest colitis.       I personally reviewed the images and my interpretation is ABIs are consistent with mild peripheral disease bilaterally    LABS:      Sodium   Date Value Ref Range Status   05/26/2022 143 136 - 145 mmol/L Final   04/23/2022 140 133 - 144 mmol/L Final   04/22/2022 137 133 - 144 mmol/L Final   10/24/2017 139 133 - 144 mmol/L Final   02/14/2017 140 133 - 144 mmol/L Final   04/12/2016 140 133 - 144 mmol/L Final     Urea Nitrogen   Date Value Ref Range Status   05/26/2022 21 8 - 28 mg/dL Final   04/23/2022 20 7 - 30 mg/dL  Final   04/22/2022 30 7 - 30 mg/dL Final   10/24/2017 18 7 - 30 mg/dL Final   02/14/2017 18 7 - 30 mg/dL Final   04/12/2016 24 7 - 30 mg/dL Final     Hemoglobin   Date Value Ref Range Status   05/26/2022 10.3 (L) 11.7 - 15.7 g/dL Final   04/23/2022 10.0 (L) 11.7 - 15.7 g/dL Final   04/22/2022 9.9 (L) 11.7 - 15.7 g/dL Final   10/24/2017 12.2 11.7 - 15.7 g/dL Final   02/14/2017 11.4 (L) 11.7 - 15.7 g/dL Final   04/12/2016 10.4 (L) 11.7 - 15.7 g/dL Final     Platelet Count   Date Value Ref Range Status   05/26/2022 385 150 - 450 10e3/uL Final   04/23/2022 348 150 - 450 10e3/uL Final   04/22/2022 334 150 - 450 10e3/uL Final   04/12/2016 287 150 - 450 10e9/L Final   03/22/2016 289 150 - 450 10e9/L Final   03/14/2016 324 150 - 450 10e9/L Final     BNP   Date Value Ref Range Status   05/26/2022 56 0 - 151 pg/mL Final   04/07/2022 17 0 - 151 pg/mL Final     INR   Date Value Ref Range Status   05/26/2022 1.01 0.85 - 1.15 Final   10/26/2015 2.01 (H) 0.86 - 1.14 Final   10/25/2015 2.04 (H) 0.86 - 1.14 Final   07/30/2015 1.59 (H) 0.86 - 1.14 Final     INR Point of Care   Date Value Ref Range Status   09/06/2016 0.9 0.86 - 1.14 Final   07/26/2016 2.6 2.0 - 3.0 Final   06/14/2016 2.1 2.0 - 3.0 Final   06/14/2016 2.1 2.0 - 3.0 Final       50 minutes spent on the day of encounter doing chart review, history and exam, documentation, and further activities as noted.       Gemma Jacobo MD, [facility]  VASCULAR SURGERY

## 2022-05-27 NOTE — PROGRESS NOTES
"PRIMARY DIAGNOSIS: \"GENERIC\" NURSING  OUTPATIENT/OBSERVATION GOALS TO BE MET BEFORE DISCHARGE:  1. ADLs back to baseline: No    2. Activity and level of assistance: Bedrest at this time.    3. Pain status: Improved with use of alternative comfort measures i.e.: positioning and distraction using therapeutic presence and reassurance.    4. Return to near baseline physical activity: No     Discharge Planner Nurse   Safe discharge environment identified: No  Barriers to discharge: Yes       Entered by: Bridgett Hudson RN 05/27/2022 7:01 AM     Please review provider order for any additional goals.   Nurse to notify provider when observation goals have been met and patient is ready for discharge.  "

## 2022-05-27 NOTE — PROGRESS NOTES
"[facility] ACUTE PAIN SERVICE    ([facility], [facility], [facility])   Consult Note    Date of Admission:  5/26/2022  Date of Consult: 05/27/22  Physician requesting consult: Dr. Sutton   Reason for consult: acute on chronic left leg pain, sedation     Assessment/Plan:     Tonya Yang is a 81 year old female who was admitted on 5/26/2022.   . I was asked to see the patient for acute on chronic left leg pain, sedation. 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  .At baseline she takes Cymbalta, gabapentin, Meloxicam, Lamictal, Robaxin, Percocet, and prednisone.  Pain began many years ago, worse over the last 7 years. Pain is located \"all over\". She has previously seen a pain clinic. The patient does no longer  smoke and denies chemical dependency history. Vascular surgery consulted. Patient has been seen multiple times by PMT.     Patient is sleeping in ED, does wake up briefly to talk to me but then falls back asleep, we do this a few times. She does appear to have fallen asleep eating lunch, mac and cheese on her shirt, I do clean her up as able.     Regarding home medications,  Would not recommend diazepam with Percocet given risk for overdose.  I would not add additional (sedating or cognitive altering)  medications to her treatment plan given concern for polypharmacy.     Last time we discussed with her, our team recommended:  \"good candidate for an intrathecal pain pump and would ask neurosurgery to weigh " "in on that additionally I would stop the Valium, this was not intended to be a chronic pain med.  It will make it extremely challenging to titrate opioids when she is taking so many different drugs.  Discontinue gabapentin and favor Lyrica trial given concern of lower extremity edema and no benefit with gabapentin.\"   We did recommend she re-establish care with [facility] pain clinics, when we saw her in April. It is unclear if this has happened, will discuss with her again tomorrow.   Would continue with these same recommendations this time.     Patient seen again on floor when arrived in 330. Is more alert. Discussed need to give diazepam and opioids separately, she agrees, parameters in order. Discussed we will try to reduce Diazapem and it is not indicated for chronic long term use. She acknowledges what I am saying but disagrees. She asks for daily miralax, 4 days since BM. I did order this.    PLAN: Do not give Percocet or Diazepam or Lyrica if excessively sedated: if opens eyes briefly and falls back to sleep immediately, do not give. Would like to see cognitively clear and awake for 15-30 min prior to administration of these meds.   1) Pain is consistent with chronic home pain, unknown etiology, very multifactorial. The patient's home MME was30 mg daily. .   2)Multimodal Medication Therapy  Topical: does use Voltaren gel at home, add here qid prn  NSAID'S: is on prednisone 6mg daily  Muscle Relaxants: add home Robaxin , unknown benefit but minimal EAGLE risk  Adjuvants: gabapentin 200mg BID, Lyrica 25mg at bedtime: discontinue gabapentin and trial Lyrica 25mg BID (renally dosed). Will assess effectiveness tomorrow. No proven benefit to ubaldo + Lyrica at the same time, potentially increasing ADEs, so will trial just Lyrica.  Atarax 25mg TIDPRN  Antidepressants/anxiolytics:Cymbalta 60mg daily  Diazepam 0.5mg bidprn: decrease to 0.3mg dose, plan to stop tomorrow or Sunday.    Opioids: Percocet 5-325mg q6hprn  IV: " "morphine 2mg q4hprn: will discontinue. Based on my interview, this appears to be all chronic pain, not acute. IV Opioids never indicated for chronic pain.    3)Non-medication interventions  Pharmacy consult- appreciate recommendations   Acupuncture consult- as available Mon and Friday : declines, she appears not cognitively ready anyway    Integrative consult - called referral to [phone]   4)Constipation Prophylaxis  5) Follow up   -Opioid prescriber has been Ananya Mclean  -Discharge Recommendations - We recommend prescribing the following at the time of discharge: TBD          History of Present Illness (HPI):       Tonya Yang is a 81 year old old female .  The pain is reported to be acute on chronic, located 'all over', She has tried physical therapy both land and water.  She has tried epidural steroid injections.  She states she has had so many epidural steroid injections she, \"lost count\".  She has tried Toradol.  She has tried Percocet.  No benefit really with gabapentin.  She states Percocet no longer really works either.        Opioid induced side effects noted, including sedation: yes,easily able to wake up but falls right back asleep, nausea: no, and constipation: unsure.    Review of medical record/Summary of labs and care everywhere.     Past pain treatments have included pain clinic-neuro, tried Percocet, tried gabapentin, tried Robaxin, tried Valium, tried Toradol.     Last UA:not recent     MN  pulled from system on 05/27/22. Last refill on 5/1. This indicated chronic opioid use. Most common prescriber is Ananya Mclean MD.   5/1/ gabapentin [id number] for 90 d  4/19 Percocet 120 for 30 d  Consistent use of these  No Diazepam since 3/15/22    Medical History    She  has a past surgical history that includes Rectal surgery; Foot surgery; picc insertion (8/28/2013); GI surgery; orthopedic surgery; vascular surgery; Laparoscopic hepatectomy partial (11/4/2013); cataract iol, rt/lt (Left, " 2013); and IR Lumbar Sacral Transfor Injection Bilateral (Bilateral, 3/11/2022).     Past Surgical History:   Procedure Laterality Date     CATARACT IOL, RT/LT Left 2013     FOOT SURGERY      toe     GI SURGERY       IR LUMBAR/SACRAL TRANSFOR INJ BILATERAL Bilateral 3/11/2022     LAPAROSCOPIC HEPATECTOMY PARTIAL  2013    Procedure: LAPAROSCOPIC HEPATECTOMY PARTIAL;  Laparoscopic Debridement of Liver Abcess;  Surgeon: Sepideh Green MD;  Location: [facility] OR     ORTHOPEDIC SURGERY       PICC INSERTION  2013    5fr DL Power PICC, 41cm (1cm external length) in the R lateral brachial vein with tip in the SVC RA junction.     RECTAL SURGERY      1970s     VASCULAR SURGERY         Allergies  Allergies   Allergen Reactions     Propofol Nausea and Vomiting     Shellfish Allergy      Other reaction(s): Dizziness     Capsaicin Rash and Blisters       Prior to Admission Medications   (Not in a hospital admission)      Social History  Reviewed, and she  reports that she quit smoking about 34 years ago. Her smoking use included cigarettes. She has quit using smokeless tobacco. She reports previous alcohol use. She reports that she does not use drugs.  Social History     Tobacco Use     Smoking status: Former Smoker     Types: Cigarettes     Quit date: 11/15/1987     Years since quittin.5     Smokeless tobacco: Former User   Substance Use Topics     Alcohol use: Not Currently     Alcohol/week: 0.0 standard drinks     Comment: MALISSA -paco since      Lorraine Smith, Nico  Acute Care Pain Management Program  [facility] ([place], [place], [place])   With questions call [phone]  Preference if for [employer] Paging - [place]  Click HERE to page Mara

## 2022-05-27 NOTE — PHARMACY-ADMISSION MEDICATION HISTORY
Pharmacy Note - Admission Medication History    Pertinent Provider Information: N/A     ______________________________________________________________________    Prior To Admission (PTA) med list completed and updated in EMR.       PTA Med List   Medication Sig Last Dose     acetaminophen (TYLENOL) 325 MG tablet Take 1-2 tablets (325-650 mg) by mouth every 6 hours as needed for mild pain Unknown at Unknown time     atorvastatin (LIPITOR) 10 MG tablet Take 10 mg by mouth At Bedtime 5/26/2022 at PM     cyanocobalamin 1000 MCG SUBL Place 1,000 mcg under the tongue daily 5/26/2022 at AM     DIAZEPAM 5 MG/5ML solution Take 0.5 mg by mouth 2 times daily as needed for anxiety Unknown at Unknown time     diclofenac (VOLTAREN) 1 % topical gel Apply 4 g topically 4 times daily (Patient taking differently: Apply 4 g topically 4 times daily as needed) Unknown at Unknown time     DULoxetine (CYMBALTA) 60 MG EC capsule TAKE 1 CAPSULE (60 MG) BY MOUTH DAILY 5/26/2022 at AM     ferrous sulfate (FEROSUL) 325 (65 Fe) MG tablet Take 1 tablet (325 mg) by mouth daily 5/26/2022 at AM     furosemide (LASIX) 20 MG tablet Take 1 tablet (20 mg) by mouth every evening 2 pm (Patient taking differently: Take 20 mg by mouth daily as needed) Unknown at Unknown time     gabapentin (NEURONTIN) 100 MG capsule Take 200 mg by mouth 2 times daily 5/26/2022 at x2     glipiZIDE (GLUCOTROL) 10 MG tablet Take 1 tablet (10 mg) by mouth 2 times daily (before meals) 5/26/2022 at x2     hydrOXYzine (ATARAX) 25 MG tablet Take 25 mg by mouth 3 times daily as needed for anxiety Unknown at Unknown time     JARDIANCE 25 MG TABS tablet Take 25 mg by mouth daily  5/26/2022 at AM     lamoTRIgine (LAMICTAL) 100 MG tablet Take 100 mg by mouth 2 times daily 5/26/2022 at PM x2     melatonin 1 MG TABS tablet Take 1 tablet (1 mg) by mouth nightly as needed for sleep Unknown at Unknown time     metFORMIN (GLUCOPHAGE) 500 MG tablet Take 750 mg by mouth daily (with dinner)  5/26/2022 at 1800     methocarbamol (ROBAXIN) 500 MG tablet Take 1 tablet (500 mg) by mouth every 12 hours as needed for muscle spasms Unknown at Unknown time     miconazole (MICATIN) 2 % external powder Apply topically 2 times daily as needed To the skin fold Unknown at Unknown time     mineral oil-hydrophilic petrolatum (AQUAPHOR) external ointment Apply topically 3 times daily as needed To the dry skin Unknown at Unknown time     omeprazole (PRILOSEC) 20 MG DR capsule Take 20 mg by mouth daily  5/26/2022 at AM     oxybutynin ER (DITROPAN XL) 15 MG 24 hr tablet Take 15 mg by mouth daily 5/26/2022 at AM     oxyCODONE-acetaminophen (PERCOCET) 5-325 MG tablet Take 1 tablet by mouth every 6 hours as needed for severe pain (Max 4 tablets daily) 5/26/2022 at Unknown time     polyethylene glycol (MIRALAX) 17 GM/Dose powder Take 17 g by mouth daily (Patient taking differently: Take 17 g by mouth daily as needed for constipation) Unknown at Unknown time     predniSONE (DELTASONE) 1 MG tablet Take 6 mg by mouth daily  5/26/2022 at AM     pregabalin (LYRICA) 25 MG capsule Take 1 capsule (25 mg) by mouth At Bedtime 5/26/2022 at PM     senna-docusate (SENOKOT-S/PERICOLACE) 8.6-50 MG tablet Take 1 tablet by mouth 2 times daily (Patient taking differently: Take 1 tablet by mouth 2 times daily as needed) Unknown at Unknown time     triamcinolone (KENALOG) 0.1 % external cream Apply topically 2 times daily as needed  Unknown at Unknown time     vitamin D3 (CHOLECALCIFEROL) 50 mcg (2000 units) tablet Take 3 tablets by mouth daily 5/26/2022 at AM       Information source(s): Family member, Hospital records and CareEverywhere/[facility]  Method of interview communication: in-person    Summary of Changes to PTA Med List  New: Valium, oxybutynin  Discontinued: oxycodone, lisinopril, Coreg, Mirabegron, Maxzide  Changed: Miralax prn, senna-s prn, Lasix prn, Voltaren gel prn    Patient was asked about OTC/herbal products specifically.   PTA med list reflects this.    In the past week, patient estimated taking medication this percent of the time:  greater than 90%.    Allergies were reviewed, assessed, and updated with the patient.      Patient does not use any multi-dose medications prior to admission.    The information provided in this note is only as accurate as the sources available at the time of the update(s).    Thank you for the opportunity to participate in the care of this patient.    Zach Christianson [facility]  5/26/2022 11:02 PM

## 2022-05-27 NOTE — PROGRESS NOTES
05/27/22 0900   Quick Adds   Type of Visit Initial PT Evaluation   Living Environment   People in Home alone  (has a caregiver from 9-4, PT a few times a week and an NP)   Current Living Arrangements apartment   Home Accessibility stairs to enter home;stairs within home   Number of Stairs, Main Entrance none  (has an elevator to apartment, will have to do stairs)   Stair Railings, Main Entrance none   Number of Stairs, Within Home, Primary none   Stair Railings, Within Home, Primary none   Self-Care   Usual Activity Tolerance moderate   Current Activity Tolerance moderate   General Information   Onset of Illness/Injury or Date of Surgery 05/26/22   Referring Physician Rebecca Sutton MD   Patient/Family Therapy Goals Statement (PT) Return to home   Pertinent History of Current Problem (include personal factors and/or comorbidities that impact the POC) Claudication of LE   Existing Precautions/Restrictions no known precautions/restrictions   Weight-Bearing Status - LUE full weight-bearing   Weight-Bearing Status - RUE full weight-bearing   Weight-Bearing Status - LLE full weight-bearing   Weight-Bearing Status - RLE full weight-bearing   Bed Mobility   Bed Mobility supine-sit   Supine-Sit Ralph (Bed Mobility) moderate assist (50% patient effort);minimum assist (75% patient effort)   Impairments Contributing to Impaired Bed Mobility pain;decreased ROM;decreased strength   Transfers   Transfers sit-stand transfer   Maintains Weight-bearing Status (Transfers) able to maintain   Transfer Safety Concerns Noted decreased step length   Impairments Contributing to Impaired Transfers pain;decreased ROM;decreased strength   Sit-Stand Transfer   Sit-Stand Ralph (Transfers) verbal cues;minimum assist (75% patient effort)   Assistive Device (Sit-Stand Transfers) walker, front-wheeled   Gait/Stairs (Locomotion)   Ralph Level (Gait) minimum assist (75% patient effort);verbal cues   Assistive Device (Gait)  walker, front-wheeled   Distance in Feet (Required for LE Total Joints) 1 x SPT   Pattern (Gait) step-to   Deviations/Abnormal Patterns (Gait) antalgic;juarez decreased;gait speed decreased   Maintains Weight-bearing Status (Gait) able to maintain   Clinical Impression   Criteria for Skilled Therapeutic Intervention Yes, treatment indicated   PT Diagnosis (PT) impaired functional mobility   Influenced by the following impairments weakness, pain, impaired balance   Functional limitations due to impairments gait, transfers   Clinical Presentation (PT Evaluation Complexity) Stable/Uncomplicated   Clinical Presentation Rationale pt presents as medically diagnosed   Clinical Decision Making (Complexity) low complexity   Planned Therapy Interventions (PT) strengthening;transfer training   Anticipated Equipment Needs at Discharge (PT) wheelchair   Risk & Benefits of therapy have been explained evaluation/treatment results reviewed   PT Discharge Planning   PT Discharge Recommendation (DC Rec) (S)  Transitional Care Facility   PT Rationale for DC Rec increased assistance with gait/transfers, low activity tolerance   Total Evaluation Time   Total Evaluation Time (Minutes) 10   Physical Therapy Goals   PT Frequency Daily   PT Predicted Duration/Target Date for Goal Attainment 06/01/22   PT Goals Gait;Transfers   PT: Transfers Modified independent;Assistive device;Sit to/from stand   PT: Gait Supervision/stand-by assist;50 feet;Assistive device

## 2022-05-27 NOTE — UTILIZATION REVIEW
Admission Status; Secondary Review Determination   Under the authority of the Utilization Management Committee, the utilization review process indicated a secondary review on Tonya Yang. The review outcome is based on review of the medical records, discussions with staff, and applying clinical experience noted on the date of the review.   (x) Inpatient Status Appropriate - This patient's medical care is consistent with medical management for inpatient care and reasonable inpatient medical practice.     RATIONALE FOR DETERMINATION   81 yr old female with chronic pain on multiple pain medication/modalities, LE edema and severe spinal stenosis who presented to ED with increase in LE edema and pain.  Cold feet with concern for PVD.  Admitted as observation with vascular consultation determining microvascular disease.  Unfortunately she has been very sedated with her home medications for pain including: gabapentin, cymbalta, lyrica, prednisone, robaxin, valium and percocet.  Unsafe to continue these as current given amount of sedation and not stable for discharge.  Pain team consultation to be obtained to try to set up with better/safer pain modalities.   At the time of admission with the information available to the attending physician more than 2 nights Hospital complex care was anticipated, based on patient risk of adverse outcome if treated as outpatient and complex care required. Inpatient admission is appropriate based on the Medicare guidelines.   The information on this document is developed by the utilization review team in order for the business office to ensure compliance. This only denotes the appropriateness of proper admission status and does not reflect the quality of care rendered.   The definitions of Inpatient Status and Observation Status used in making the determination above are those provided in the CMS Coverage Manual, Chapter 1 and Chapter 6, section 70.4.   Sincerely,   Yudelka Romero,  MD  Utilization Review  Physician Advisor  [facility]

## 2022-05-27 NOTE — TELEPHONE ENCOUNTER
Consult received from Ericka from [facility].  Inpt consult, routine for arterial occlusion    Urgency: within 24 hours-message sent to Vascular Surgery Team in teams

## 2022-05-27 NOTE — PROGRESS NOTES
[facility] Medicine PROGRESS NOTE      Identification/Summary:   Tonya Yang is a 81 year old female who has a past medical history of chronic pain, chronic lower extremity edema, severe spinal stenosis had steroid injections in her back, follows up with pain clinic, anxiety, Arthritis, Asthma, Bipolar 1 disorder (H), COPD Depression, Diabetes mellitus (H), Dyspepsia, GERD Hard of hearing, Hyperlipidemia, Hypertension, Irritable bowel syndrome, Meniere disease, Neuropathy, multiple recent hospitalizations in the last 2 months presented to the ED with complaints of acute on chronic lower extremity edema, bilateral leg pain, her feet were feeling very cold.  CTA chest abdomen pelvis in the ED there was concern for left peroneal artery occlusion, ABIs were 0.89 on the right, greater than 1 on the left.  Vascular surgery called me that they suspect microvascular disease in her left foot, recommend aspirin, statin, outpatient follow-up.  Official consult pending.  She is currently sedated opens eyes and answers questions and goes back to sleep immediately.  Pain team consulted  To adjust her pain meds.  PT OT recommending TCU.     Assessment and Plan  Acute on chronic lower extremity edema  Acute on chronic leg pain/history of severe spinal stenosis  ? PAD/microvascular disease  Left foot appears purple dorsalis pedis pulses weak, capillary refill about 3 seconds  Discussed with vascular surgery  Start aspirin, statin increase dose from 10 to 40 mg daily, will need LFTs check in 2 to 3 weeks   JOAQUIN normal on the left  Vascular surgery consulted  PT/OT ordered, she has refused TCU and left AMA in the past  Continue gabapentin, Cymbalta, Lyrica, prednisone, Robaxin, Percocet somnolence secondary to polypharmacy  Pain team consulted  She has been noncompliant with Lasix at home  Initiate Lasix 40 mg daily.    CKD  Creatinine normal in the ED  Monitor BMP with Lasix     Type 2 diabetes with neuropathy  Continue PTA  "medications  Got contrast, hold metformin  On prednisone daily for her hearing loss  Continue Jardiance, glipizide  Blood sugars so far stable.    Mood disorder  Confirms that she is taking Lamictal  Can resume at home dose     Normocytic anemia  Iron deficiency anemia  Continue oral iron    Diet: Moderate Consistent Carb (60 g CHO per Meal) Diet  DVT Prophylaxis: Start Lovenox  Code Status: Full Code    Anticipated possible discharge in 1-2 days once pain control, vascular surgery consult milestones are met.    Interval History/Subjective:  She is sedated from her medications and just received Robaxin, Percocet.  Unable to keep her eyes open to have conversation. She answers yes or no to questions and falls back to sleep.  According to staff when she is awake constantly rating her pain a high and asking for pain meds.  No other shortness of breath, chest pain, urinary or bowel complaints noted.  She reports pressure in her feet.  Her son at bedside.  Multiple questions answered.    Physical Exam/Objective:  Vitals I/O   Vital signs:  Temp: 98.2  F (36.8  C) Temp src: Oral BP: (!) 170/76 Pulse: 82   Resp: 18 SpO2: 96 % O2 Device: None (Room air) Oxygen Delivery: 2 LPM Height: 165.1 cm (5' 5\") Weight: 81.6 kg (180 lb)  Estimated body mass index is 29.95 kg/m  as calculated from the following:    Height as of this encounter: 1.651 m (5' 5\").    Weight as of this encounter: 81.6 kg (180 lb). I/O last 3 completed shifts:  In: 480 [P.O.:480]  Out: -      Body mass index is 29.95 kg/m .    General Appearance:   Sedated no distress   Head:  Normocephalic, atraumatic   Eyes:  PERRL    Throat:  mucosa; moist   Neck: No JVD, thyromegaly   Lungs:   Clear to auscultation bilaterally, respirations unlabored   Chest Wall:  No tenderness or deformity   Heart:  Regular rate and rhythm, S1, S2 normal,no murmur   Abdomen:   Soft, non tender, non distended, bowel sounds present, no guarding or rigidity   Extremities:  Bilateral lower " extremity edema   Skin:  Left foot appears purple, capillary refill 3 seconds    Neurologic:  Sedated able to arouse and answer questions moves all 4 extremities       Medications:   Personally Reviewed.    aspirin  81 mg Oral Daily     atorvastatin  40 mg Oral At Bedtime     DULoxetine  60 mg Oral Daily     empagliflozin  25 mg Oral Daily     gabapentin  200 mg Oral BID     glipiZIDE  10 mg Oral BID AC     lamoTRIgine  100 mg Oral BID     oxybutynin ER  15 mg Oral Daily     pantoprazole  40 mg Oral QAM AC     predniSONE  6 mg Oral Daily     pregabalin  25 mg Oral At Bedtime       Data reviewed today: I personally reviewed all new medications, labs, imaging/diagnostics reports over the past 24 hours. Pertinent findings include    Labs:  Most Recent 3 CBC's:Recent Labs   Lab Test 05/26/22 2045 04/23/22  1012 04/22/22  0935   WBC 9.5 7.5 8.6   HGB 10.3* 10.0* 9.9*   MCV 81 85 85    348 334     Most Recent 3 BMP's:Recent Labs   Lab Test 05/27/22  0802 05/26/22 2045 04/25/22  1213 04/23/22 2058 04/23/22  1748 04/23/22  1208 04/23/22  1012 04/22/22  1936 04/22/22  0935   NA  --  143  --   --   --   --  140  --  137   POTASSIUM  --  3.7  --   --  4.1  --  3.2*  --  3.3*   CHLORIDE  --  103  --   --   --   --  103  --  100   CO2  --  26  --   --   --   --  32  --  29   BUN  --  21  --   --   --   --  20  --  30   CR  --  0.83  --   --   --   --  0.86  --  1.14*   ANIONGAP  --  14  --   --   --   --  5  --  8   MACY  --  10.2  --   --   --   --  10.1  --  9.6   * 125 276*   < >  --    < > 124*   < > 201*    < > = values in this interval not displayed.       Imaging:   Recent Results (from the past 24 hour(s))   CTA Chest Abdomen Pelvis Runoff w Contrast    [facility]  CT OF THE CHEST, ABDOMEN, PELVIS, AND BILATERAL LOWER EXTREMITIES USING CTA TECHNIQUE WITH RUNOFF.  5/26/2022 10:00 PM    INDICATION: cold, pale bilateral feet, dimished pulse in right foot. no pulse in  left foot  TECHNIQUE: Noncontrast. Axial, sagittal and coronal thin-section reconstruction. Dose reduction techniques were used.  COMPARISON: None.    FINDINGS: CTA examination of the chest demonstrates mild calcification of the thoracic aorta with no evidence for significant stenosis, aneurysmal dilatation, dissection, or intraluminal clot.    CTA examination of the abdomen demonstrates mild vascular calcification involving the abdominal aorta with no evidence for significant stenosis, aneurysmal dilatation, dissection, or intraluminal clot identified.    CTA examination of the pelvis demonstrates mild vascular calcification with no evidence for significant stenosis, aneurysmal dilatation, dissection, or intraluminal clot. There is good flow extending into both groins.    CTA examination of the right lower extremity shows the catheter vessels to be small in size, but are patent with three-vessel flow to the ankle and two-vessel flow into the foot via the tibial arteries.    CTA of the left lower extremity shows loss of the peroneal artery in the midcalf region with no significant reconstitution. There is loss of the anterior tibial artery in the lower calf region. I believe the posterior tibial artery extend into the foot   although this is difficult to determine given motion.    CT of the chest demonstrates mild to moderate calcification most prominent in the LAD. There is slight dependent atelectasis seen in both lower lobes. There are minimal findings of centrilobular emphysema seen in both lungs.    CT examination of the abdomen demonstrates fatty infiltration of liver. The gallbladder and bile ducts are normal in caliber. The pancreas is normal. The spleen is normal. The adrenals are normal. There are scattered benign simple cysts seen in both   kidneys with no follow-up recommended. The bowel demonstrates mild findings of obstipation in the colon. There is localized narrowing seen involving the hepatic  flexure of the colon this likely represents an area of spasm since there is no evidence for   stranding of the adjacent fat to suggest colitis. There are moderate findings of diverticulosis with no evidence for diverticulitis. The pelvic organs demonstrates a calcification in the atrophic uterus most typical for some fibroids. The pelvic organs   are otherwise normal. The musculoskeletal region demonstrates a small fatty umbilical hernia. There are some scattered injection granulomas in the buttocks. Significant degenerative changes are seen throughout the spine to include degenerative disc   disease at the L4 and L5 levels.      Impression    IMPRESSION:    1. There is some loss of detail given patient motion especially in the lower extremities.    2. CTA of the left lower extremity shows loss of the peroneal artery in the midcalf region with no significant reconstitution. There is loss of the anterior tibial artery in the lower calf region. I believe the posterior tibial artery extend into the   foot although this is difficult to determine given motion. There is soft tissue edema seen in both lower extremities greatest in the left lower extremity.    3.There is localized narrowing seen involving the hepatic flexure of the colon this likely represents an area of spasm since there is no evidence for stranding of the adjacent fat to suggest colitis.    US JOAQUIN Doppler No Exercise 1-2 Levels Bilateral    Narrative    EXAM: RESTING ANKLE-BRACHIAL INDICES (ABIs)  LOCATION: [facility]  DATE/TIME: 5/27/2022 7:12 AM    INDICATION: PAD.  COMPARISON: None.    JOAQUIN FINDINGS:  RIGHT  Brachial: 176  Ankle (PT): 157 Index: 0.87  Ankle (DP): 161 Index: 0.89  Digit: 73 Index: 0.41    LEFT  Brachial: 180  Ankle (PT): 172 Index: 0.96  Ankle (DP): 186 Index: 1.0  Digit: 46 Index: 0.26    The right JOAQUIN at rest is 0.89. The left JOAQUIN at rest is 1.0.        Impression    IMPRESSION:  1.  RIGHT LOWER EXTREMITY: JOAQUIN at  rest is borderline abnormal. Right digital index is decreased however digital pressures are adequate for healing potential.  2.  LEFT LOWER EXTREMITY: JOAQUIN at rest is normal. Left digital index is decreased however digital pressures are adequate for healing potential.       Rebecca Sutton MD  Hospitalist  [facility]

## 2022-05-27 NOTE — PROGRESS NOTES
[facility]      OUTPATIENT OCCUPATIONAL THERAPY  EVALUATION  PLAN OF TREATMENT FOR OUTPATIENT REHABILITATION  (COMPLETE FOR INITIAL CLAIMS ONLY)  Patient's Last Name, First Name, M.I.  YOB: 1940  Tonya Yang                          Provider's Name  [facility] Medical Record No.  [id number]                               Onset Date:  05/26/22   Start of Care Date:  05/27/22     Type:     ___PT   _X_OT   ___SLP Medical Diagnosis:  BLE claudication                        OT Diagnosis:  decr ADL indep   Visits from SOC:  1   _________________________________________________________________________________  Plan of Treatment/Functional Goals    Planned Interventions: ADL retraining, transfer training, strengthening   Goals: See Occupational Therapy Goals on Care Plan in [employer] electronic health record.    Therapy Frequency: Daily  Predicted Duration of Therapy Intervention: 06/03/22  _________________________________________________________________________________    I CERTIFY THE NEED FOR THESE SERVICES FURNISHED UNDER        THIS PLAN OF TREATMENT AND WHILE UNDER MY CARE     (Physician co-signature of this document indicates review and certification of the therapy plan).              Certification date from: 05/27/22, Certification date to: 06/03/22    Referring Physician: Lesley            Initial Assessment        See Occupational Therapy evaluation dated 05/27/22 in Epic electronic health record.

## 2022-05-27 NOTE — PROGRESS NOTES
"   05/27/22 1030   Quick Adds   Quick Adds Certification   Type of Visit Initial Occupational Therapy Evaluation   Living Environment   People in Home alone   Current Living Arrangements apartment   Living Environment Comments tub   Self-Care   Equipment Currently Used at Home shower chair;grab bar, toilet;grab bar, tub/shower;walker, rolling;dressing device   Fall history within last six months yes   Activity/Exercise/Self-Care Comment pt has caregiver support M-F 10-4:30 and saturdays half day, no assist on sundays. pt is alone evenings/overnight otherwise. son helps PRN but pt reports he is \"getting annoyed of helping\". caregiver helps with all ADLs, pt reports she tf to/from w/c and couch during night alone to use bathroom and is fearful of falling   General Information   Onset of Illness/Injury or Date of Surgery 05/26/22   Referring Physician Lesley   Patient/Family Therapy Goal Statement (OT) incr hours with PCA, not go to TCU   Additional Occupational Profile Info/Pertinent History of Current Problem claudication of BLE   Existing Precautions/Restrictions fall   Cognitive Status Examination   Affect/Mental Status (Cognitive) WNL   Follows Commands WNL   Sensory   Sensory Comments neuropathy   Pain Assessment   Patient Currently in Pain No   Integumentary/Edema   Integumentary/Edema Comments BLE edema   Range of Motion Comprehensive   General Range of Motion no range of motion deficits identified   Strength Comprehensive (MMT)   Comment, General Manual Muscle Testing (MMT) Assessment generalized weakness but BUE WFL for ADLs   Coordination   Upper Extremity Coordination No deficits were identified   Transfers   Transfers sit-stand transfer;toilet transfer  (commode pivot tf)   Transfer Comments Min/Mod A   Activities of Daily Living   BADL Assessment/Intervention lower body dressing   Lower Body Dressing Assessment/Training   Comment, (Lower Body Dressing) assist for socks   Clinical Impression   Criteria for " Skilled Therapeutic Interventions Met (OT) Yes, treatment indicated   OT Diagnosis decr ADL indep   Influenced by the following impairments BLE claudication/weakness   OT Problem List-Impairments impacting ADL strength;mobility;hearing;activity tolerance impaired   Assessment of Occupational Performance 3-5 Performance Deficits   Identified Performance Deficits funct transfers, dressing, standing   Planned Therapy Interventions (OT) ADL retraining;transfer training;strengthening   Clinical Decision Making Complexity (OT) moderate complexity   Risk & Benefits of therapy have been explained evaluation/treatment results reviewed;patient   OT Discharge Planning   OT Discharge Recommendation (DC Rec) (S)  Transitional Care Facility   OT Rationale for DC Rec pt currently does not have 24/7 PCA help at home, only 6 days a weeks during the day. TCU best option as pt needs assist for all mobility and ADLs   Therapy Certification   Start of Care Date 05/27/22   Certification date from 05/27/22   Certification date to 06/03/22   Medical Diagnosis BLE claudication   Total Evaluation Time (Minutes)   Total Evaluation Time (Minutes) 10   OT Goals   Therapy Frequency (OT) Daily   OT Predicted Duration/Target Date for Goal Attainment 06/03/22   OT Goals Toilet Transfer/Toileting;Lower Body Dressing   OT: Lower Body Dressing Moderate assist   OT: Toilet Transfer/Toileting Minimal assist   Leigh Ann Mayes, OTR/L

## 2022-05-27 NOTE — CONSULTS
Care Management Initial Consult    General Information  Assessment completed with: Patient,    Type of CM/SW Visit: Initial Assessment    Primary Care Provider verified and updated as needed: Yes   Readmission within the last 30 days: no previous admission in last 30 days      Reason for Consult: discharge planning, transportation, other (see comments), health care directive (MOON provision)  Advance Care Planning: Advance Care Planning Reviewed: no concerns identified     General Information Comments: Pt not in favor of TCU    Communication Assessment  Patient's communication style: spoken language (English or Bilingual)    Hearing Difficulty or Deaf: yes   Wear Glasses or Blind: no    Cognitive  Cognitive/Neuro/Behavioral: WDL                      Living Environment:   People in home: alone     Current living Arrangements: apartment      Able to return to prior arrangements: other (see comments) (dependent on outcome of hospitalization)       Family/Social Support:  Care provided by: self, child(pamela), homecare agency  Provides care for: no one, unable/limited ability to care for self  Marital Status:   Children, PCA          Description of Support System: Supportive, Involved    Support Assessment: Caregiver difficulty providing physical care/safety    Current Resources:   Patient receiving home care services: Yes  Skilled Home Care Services: Home Health Aid, Skilled Nursing, Physicial Therapy  Community Resources: Home Care, PCA  Equipment currently used at home: shower chair, grab bar, tub/shower, grab bar, toilet, walker, rolling, cane, straight, wheelchair, manual  Supplies currently used at home: Diabetic Supplies    Employment/Financial:  Employment Status: retired        Financial Concerns: No concerns identified   Referral to Financial Worker: No       Lifestyle & Psychosocial Needs:  Social Determinants of Health     Tobacco Use: Medium Risk     Smoking Tobacco Use: Former Smoker     Smokeless  Tobacco Use: Former User   Alcohol Use: Not on file   Financial Resource Strain: Not on file   Food Insecurity: Not on file   Transportation Needs: Not on file   Physical Activity: Not on file   Stress: Not on file   Social Connections: Not on file   Intimate Partner Violence: Not on file   Depression: Not at risk     PHQ-2 Score: 0   Housing Stability: Not on file       Functional Status:  Prior to admission patient needed assistance:   Dependent ADLs:: Wheelchair-independent, Transfers  Dependent IADLs:: Laundry, Cleaning, Transportation, Shopping  Assesssment of Functional Status: Not at  functional baseline    Mental Health Status:  Mental Health Status: No Current Concerns       Chemical Dependency Status:  Chemical Dependency Status: No Current Concerns             Values/Beliefs:  Spiritual, Cultural Beliefs, Hindu Practices, Values that affect care:  (none stated by patient)               Additional Information:  Writer met with patient to introduce Care Management (CM) service and obtain initial assessment. Pt lives alone on the 7th floor of an apartment building. Pt reports she receive help through a PCA service home care through [facility] 6 hours a day 3-4 days a week (not confirmed), states PCA visits on Saturdays, pt son is also involved with pt's care as needed. Pt reports that she currently ambulates with use of wheelchair, has previously used a cane and rolling walker. Pt believes that her physical health continues to decline with current amount of time spent with in home services. Pt son (Davie) also provides assistance.      During interaction with pt, pt made it clear that she will not go to a TCU and insists that, at time of discharge, she be discharge to home and stated that someone from [facility] told her she would receive increased hours of service(unconfirmed).  Pt reported having a negative experience with [facility].    Possible surgery pending Vascular Surgeon  consult. CM to verify home care services and assist with discharge service co-ordination as applicable. Waiting to her from pt's son for additional information and confirm assessment information. Transport TBD     OTILIA Haskins

## 2022-05-27 NOTE — ED TRIAGE NOTES
Pt comes in from home via EMS. Her son is at the bedside. They are here with concerns the PT may have a DVT. Per the PT she has bilateral foot and calf pain. The PT was 90% on room air. I placed the PT on 3L nasal canula and she is now at 97%.      Triage Assessment     Row Name 05/26/22 2011       Triage Assessment (Adult)    Airway WDL WDL       Respiratory WDL    Respiratory WDL rhythm/pattern    Rhythm/Pattern, Respiratory shortness of breath       Skin Circulation/Temperature WDL    Skin Circulation/Temperature WDL WDL       Cardiac WDL    Cardiac WDL WDL       Peripheral/Neurovascular WDL    Peripheral Neurovascular WDL WDL       Cognitive/Neuro/Behavioral WDL    Cognitive/Neuro/Behavioral WDL WDL       Dmitry Coma Scale    Best Eye Response 4-->(E4) spontaneous    Best Motor Response 6-->(M6) obeys commands    Best Verbal Response 5-->(V5) oriented    [place] Coma Scale Score 15

## 2022-05-27 NOTE — PROGRESS NOTES
[facility]      OUTPATIENT PHYSICAL THERAPY EVALUATION  PLAN OF TREATMENT FOR OUTPATIENT REHABILITATION  (COMPLETE FOR INITIAL CLAIMS ONLY)  Patient's Last Name, First Name, M.I.  YOB: 1940  Tonya Yang                        Provider's Name  [facility] Medical Record No.  [id number]                               Onset Date:  05/26/22   Start of Care Date:         Type:     _X_PT   ___OT   ___SLP Medical Diagnosis:                           PT Diagnosis:  impaired functional mobility   Visits from SOC:  1   _________________________________________________________________________________  Plan of Treatment/Functional Goals    Planned Interventions: strengthening, transfer training     Goals: See Physical Therapy Goals on Care Plan in [employer] electronic health record.    Therapy Frequency: Daily  Predicted Duration of Therapy Intervention: 06/01/22  _________________________________________________________________________________    I CERTIFY THE NEED FOR THESE SERVICES FURNISHED UNDER        THIS PLAN OF TREATMENT AND WHILE UNDER MY CARE     (Physician co-signature of this document indicates review and certification of the therapy plan).              Certification date from: (P) 05/27/22,      Referring Physician: Rebecca Sutton MD            Initial Assessment        See Physical Therapy evaluation dated   in Epic electronic health record.

## 2022-05-28 LAB
GLUCOSE BLDC GLUCOMTR-MCNC: 125 MG/DL (ref 70–99)
GLUCOSE BLDC GLUCOMTR-MCNC: 137 MG/DL (ref 70–99)
GLUCOSE BLDC GLUCOMTR-MCNC: 166 MG/DL (ref 70–99)
GLUCOSE BLDC GLUCOMTR-MCNC: 190 MG/DL (ref 70–99)
GLUCOSE BLDC GLUCOMTR-MCNC: 192 MG/DL (ref 70–99)

## 2022-05-28 PROCEDURE — 99233 SBSQ HOSP IP/OBS HIGH 50: CPT | Performed by: FAMILY MEDICINE

## 2022-05-28 PROCEDURE — 250N000011 HC RX IP 250 OP 636: Performed by: INTERNAL MEDICINE

## 2022-05-28 PROCEDURE — 250N000013 HC RX MED GY IP 250 OP 250 PS 637: Performed by: INTERNAL MEDICINE

## 2022-05-28 PROCEDURE — 250N000011 HC RX IP 250 OP 636: Performed by: FAMILY MEDICINE

## 2022-05-28 PROCEDURE — 120N000001 HC R&B MED SURG/OB

## 2022-05-28 PROCEDURE — 250N000012 HC RX MED GY IP 250 OP 636 PS 637: Performed by: HOSPITALIST

## 2022-05-28 PROCEDURE — 250N000013 HC RX MED GY IP 250 OP 250 PS 637: Performed by: HOSPITALIST

## 2022-05-28 RX ORDER — FUROSEMIDE 10 MG/ML
40 INJECTION INTRAMUSCULAR; INTRAVENOUS ONCE
Status: COMPLETED | OUTPATIENT
Start: 2022-05-28 | End: 2022-05-28

## 2022-05-28 RX ADMIN — LAMOTRIGINE 100 MG: 100 TABLET ORAL at 21:06

## 2022-05-28 RX ADMIN — OXYBUTYNIN 15 MG: 5 TABLET, FILM COATED, EXTENDED RELEASE ORAL at 09:23

## 2022-05-28 RX ADMIN — ENOXAPARIN SODIUM 40 MG: 100 INJECTION SUBCUTANEOUS at 16:30

## 2022-05-28 RX ADMIN — OXYCODONE AND ACETAMINOPHEN 1 TABLET: 5; 325 TABLET ORAL at 09:22

## 2022-05-28 RX ADMIN — DULOXETINE HYDROCHLORIDE 60 MG: 60 CAPSULE, DELAYED RELEASE PELLETS ORAL at 09:22

## 2022-05-28 RX ADMIN — ASPIRIN 81 MG: 81 TABLET, COATED ORAL at 09:22

## 2022-05-28 RX ADMIN — METHOCARBAMOL TABLETS 500 MG: 500 TABLET, COATED ORAL at 21:33

## 2022-05-28 RX ADMIN — METHOCARBAMOL TABLETS 500 MG: 500 TABLET, COATED ORAL at 12:43

## 2022-05-28 RX ADMIN — LAMOTRIGINE 100 MG: 100 TABLET ORAL at 09:22

## 2022-05-28 RX ADMIN — PANTOPRAZOLE SODIUM 40 MG: 20 TABLET, DELAYED RELEASE ORAL at 09:22

## 2022-05-28 RX ADMIN — OXYCODONE AND ACETAMINOPHEN 1 TABLET: 5; 325 TABLET ORAL at 16:29

## 2022-05-28 RX ADMIN — POLYETHYLENE GLYCOL 3350 17 G: 17 POWDER, FOR SOLUTION ORAL at 09:23

## 2022-05-28 RX ADMIN — PREDNISONE 6 MG: 1 TABLET ORAL at 09:23

## 2022-05-28 RX ADMIN — EMPAGLIFLOZIN 25 MG: 25 TABLET, FILM COATED ORAL at 09:23

## 2022-05-28 RX ADMIN — HYDROXYZINE HYDROCHLORIDE 25 MG: 25 TABLET, FILM COATED ORAL at 09:22

## 2022-05-28 RX ADMIN — OXYCODONE AND ACETAMINOPHEN 1 TABLET: 5; 325 TABLET ORAL at 22:36

## 2022-05-28 RX ADMIN — GLIPIZIDE 10 MG: 10 TABLET ORAL at 16:30

## 2022-05-28 RX ADMIN — PREGABALIN 25 MG: 25 CAPSULE ORAL at 09:30

## 2022-05-28 RX ADMIN — ATORVASTATIN CALCIUM 40 MG: 40 TABLET, FILM COATED ORAL at 21:06

## 2022-05-28 RX ADMIN — GLIPIZIDE 10 MG: 10 TABLET ORAL at 09:23

## 2022-05-28 RX ADMIN — PREGABALIN 25 MG: 25 CAPSULE ORAL at 21:06

## 2022-05-28 RX ADMIN — FUROSEMIDE 40 MG: 40 TABLET ORAL at 09:22

## 2022-05-28 RX ADMIN — FUROSEMIDE 40 MG: 10 INJECTION, SOLUTION INTRAMUSCULAR; INTRAVENOUS at 13:42

## 2022-05-28 ASSESSMENT — ACTIVITIES OF DAILY LIVING (ADL)
ADLS_ACUITY_SCORE: 50
ADLS_ACUITY_SCORE: 47
ADLS_ACUITY_SCORE: 46
ADLS_ACUITY_SCORE: 47
ADLS_ACUITY_SCORE: 52
ADLS_ACUITY_SCORE: 47
ADLS_ACUITY_SCORE: 50

## 2022-05-28 NOTE — PROGRESS NOTES
"[facility] ACUTE PAIN SERVICE    ([facility], [facility], [facility])     Date of Admission:  5/26/2022  Date of Consult: 05/27/22  Physician requesting consult: Dr. Sutton   Reason for consult: acute on chronic left leg pain, sedation     Assessment/Plan:     Tonya Yang is a 81 year old female who was admitted on 5/26/2022.   . I was asked to see the patient for acute on chronic left leg pain, sedation. 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  .At baseline she takes Cymbalta, gabapentin, Meloxicam, Lamictal, Robaxin, Percocet, and prednisone.  Pain began many years ago, worse over the last 7 years. Pain is located \"all over\". She has previously seen a pain clinic. The patient does no longer  smoke and denies chemical dependency history. Vascular surgery note reviewed. Patient has been seen multiple times by PMT.   Since I saw her yesterday, patient has used 10mg of oxycodone via Percocet.  Will continue to try and decrease use of diazepam as this should not be used chronically with percocet. Patient continues to disagree. However, she has not used since we discussed yesterday, so will continue to try and minimize in hospital.   Can use robaxin if needed, has not used since yesterday and this was very helpful yesterday.    PLAN: Do not give Percocet or Diazepam or Lyrica if excessively sedated: if opens eyes briefly and falls back to sleep immediately, do not give. Would like to see cognitively clear and " awake for 15-30 min prior to administration of these meds.   1) Pain is consistent with chronic home pain, unknown etiology, very multifactorial. The patient's home MME was30 mg daily. .   2)Multimodal Medication Therapy  Topical: does use Voltaren gel at home, add here qid prn  NSAID'S: is on prednisone 6mg daily  Muscle Relaxants: add home Robaxin , unknown benefit but minimal EAGLE risk  Adjuvants: gabapentin 200mg BID, Lyrica 25mg at bedtime: discontinue gabapentin and trial Lyrica 25mg BID (renally dosed). Will assess effectiveness tomorrow. No proven benefit to ubaldo + Lyrica at the same time, potentially increasing ADEs, so will trial just Lyrica.  Atarax 25mg TIDPRN  Antidepressants/anxiolytics:Cymbalta 60mg daily  Diazepam 0.5mg bidprn: decrease to 0.3mg dose, plan to stop tomorrow or Sunday.    Opioids: Percocet 5-325mg q6hprn  IV: none  3)Non-medication interventions  Pharmacy consult- appreciate recommendations   Acupuncture consult- as available Mon and Friday : declines, she appears not cognitively ready anyway    Integrative consult - called referral to [phone]   4)Constipation Prophylaxis  5) Follow up   -Opioid prescriber has been [facility]  -Discharge Recommendations - We recommend prescribing the following at the time of discharge: home pain meds. Would a pain clinic be beneficial for patient?      Lorraine Smith, Nico  Acute Care Pain Management Program  [facility] (AARON, [place], [place])   With questions call [phone]  Preference if for [facility]  Click HERE to page Mara

## 2022-05-28 NOTE — PLAN OF CARE
Problem: Plan of Care - These are the overarching goals to be used throughout the patient stay.    Goal: Absence of Hospital-Acquired Illness or Injury  Intervention: Identify and Manage Fall Risk  Recent Flowsheet Documentation  Taken 5/28/2022 0956 by Nimisha Teran, RN  Safety Promotion/Fall Prevention:   activity supervised   bed alarm on   assistive device/personal items within reach   clutter free environment maintained   lighting adjusted   nonskid shoes/slippers when out of bed   room door open   room organization consistent   room near nurse's station   supervised activity     Problem: Pain Chronic (Persistent)  Goal: Acceptable Pain Control and Functional Ability  Intervention: Develop Pain Management Plan  Recent Flowsheet Documentation  Taken 5/28/2022 0926 by Nimisha Teran, RN  Pain Management Interventions: medication (see MAR)   Patient alert and oriented, anxious at times. Complained of chronic back and generalized pain. Available pain and anxiety medication given. Patient reported improve in pain appears comfortable and is resting. Tolerating diabetic diet. Voiding spontaneously pure wick in place. Up to bedside commode with assist of two. BP up otherwise VSS. LS diminished. Plan PT/OT and Neurosurgery consult. Will monitor and continue plan of care.

## 2022-05-28 NOTE — PROGRESS NOTES
Care Management Follow Up    Length of Stay (days): 0    Expected Discharge Date:      Concerns to be Addressed: discharge planning, care coordination/care conferences, other (see comments) (pt resistant to TCU placement)  Son stated having sufficient support at home and expects pt to return there upon d/c.  Anticipated Discharge Disposition: Home Care  Anticipated Discharge Services: None  Anticipated Discharge DME: None  Patient/family educated on Medicare website which has current facility and service quality ratings: no (Currently open to a vendor.)  Education Provided on the Discharge Plan:  Yes. Son and pt notified and in agreement.  Patient/Family in Agreement with the Plan: yes  Referrals Placed by CM/SW: Homecare (Current vendor notified.)  Private pay costs discussed: Yes. Transportation costs discussed. Pt states inability to pay for transport as well as getting anxiety about people assisting with transfers.   Additional Information:  Pt intends on having her son provide transport home after work around 7pm on 5/28 or anytime on 5/29 as he has her transport wheelchair. Son also confirmed that pt has sufficient help available at home. Son has been in contact with [facility] to attempt to support pt the best as possible upon return home.    Derrek Mobley RN

## 2022-05-28 NOTE — PROGRESS NOTES
Report given to Wai on 3S.  and pt refused sliding scale insulin. Gave PRN Percocet before sending pt as she was having back and neck pain.

## 2022-05-28 NOTE — PROGRESS NOTES
Care Management Follow Up    Length of Stay (days): 1    Expected Discharge Date: 05/29/2022     Concerns to be Addressed: discharge planning, care coordination/care conferences, other (see comments) (pt resistant to TCU placement)  Son stated having sufficient support at home and expects pt to return there upon d/c.  Patient plan of care discussed at interdisciplinary rounds: Yes    Anticipated Discharge Disposition: Home Care     Anticipated Discharge Services: None  Anticipated Discharge DME: None    Patient/family educated on Medicare website which has current facility and service quality ratings: no (Currently open to a vendor.)  Education Provided on the Discharge Plan:    Patient/Family in Agreement with the Plan: yes    Referrals Placed by CM/RAJ: Homecare (Current vendor notified.)  Private pay costs discussed: Not applicable    Additional Information:  2:36 PM  RAJ spoke with son Davie via phone.  He is now in agreement to TCU.  He requested referrals be sent to [facility] and [facility].  He will look into other TCU's and connect with RAJ tomorrow, 5/29.  RAJ sent the above 3 referrals.      GLORY Baird

## 2022-05-28 NOTE — PLAN OF CARE
Problem: Diabetes Comorbidity  Goal: Blood Glucose Level Within Targeted Range  Outcome: Ongoing, Progressing   Goal Outcome Evaluation:      BG<200, HS snack given per patient request. Continue to monitor

## 2022-05-28 NOTE — PROGRESS NOTES
[facility] MEDICINE PROGRESS NOTE      Identification/Summary:  Tonya Yang is a 81 year old female who has a past medical history of chronic pain, chronic lower extremity edema, severe spinal stenosis had steroid injections in her back, follows up with pain clinic, anxiety, Arthritis, Asthma, Bipolar 1 disorder (H), COPD Depression, Diabetes mellitus (H), Dyspepsia, GERD Hard of hearing, Hyperlipidemia, Hypertension, Irritable bowel syndrome, Meniere disease, Neuropathy, multiple recent hospitalizations in the last 2 months presented to the ED with complaints of acute on chronic lower extremity edema, bilateral leg pain, her feet were feeling very cold.  CTA chest abdomen pelvis in the ED there was concern for left peroneal artery occlusion, ABIs were 0.89 on the right, greater than 1 on the left.  Vascular surgery called me that they suspect microvascular disease in her left foot, recommend aspirin, statin, outpatient follow-up.  Pain team consulted  To adjust her pain meds.  PT OT recommending TCU.    Patient initially refusing TCU.  Discussed with patient's son and he states he cannot have her come home and will convince her to go to TCU.  Contacted care management and let them know that plan is now TCU.    Discussed the case directly with neurosurgery.  She is not a surgical candidate per the report.  Son agrees.  Patient has also had a long discussions with orthopedic spine and they do not feel she is a candidate for surgical intervention.    Family understands that she may not regain the ability to stand on her own and may be wheelchair-bound moving forward.     Assessment and Plan  Acute on chronic lower extremity edema  Acute on chronic leg pain/history of severe spinal stenosis  ? PAD/microvascular disease  Left foot appears purple dorsalis pedis pulses weak, capillary refill about 3 seconds  Discussed with vascular surgery  Start aspirin, statin increase dose from 10 to 40 mg  "daily, will need LFTs check in 2 to 3 weeks   JOAQUIN normal on the left  Vascular surgery consulted and not a vascular issue  PT/OT ordered, she has refused TCU and left AMA in the past  Continue gabapentin, Cymbalta, Lyrica, prednisone, Robaxin, Percocet somnolence secondary to polypharmacy  Pain team consulted  She has been noncompliant with Lasix at home  Initiate Lasix 40 mg daily.  Needs TCU     CKD  Creatinine normal in the ED  Monitor BMP with Lasix     Type 2 diabetes with neuropathy  Continue PTA medications  Got contrast, hold metformin  On prednisone daily for her hearing loss  Continue Jardiance, glipizide  Blood sugars so far stable.     Mood disorder  Confirms that she is taking Lamictal  Can resume at home dose     Normocytic anemia  Iron deficiency anemia  Continue oral iron           # Diabetes, type II: last A1C 8.8 % (Ref range: <=5.6 %)  # Obesity: Estimated body mass index is 31.47 kg/m  as calculated from the following:    Height as of this encounter: 1.651 m (5' 5\").    Weight as of this encounter: 85.8 kg (189 lb 1.6 oz).      Diet: Moderate Consistent Carb (60 g CHO per Meal) Diet  DVT Prophylaxis: Lovenox  Code Status: Full Code    Anticipated possible discharge in 2 or 3 days if TCU available  Disposition Plan   TCU    The patient's care was discussed with the Care Coordinator/, Patient, Patient's Family and Neurosurgical Consultant.    Nagi Kaur MD  [facility] Medicine  [facility]  Phone: #[phone]    Interval History/Subjective:  Patient is very distraught about her current condition.  Concerned about pain and her ability to move.  Long visit discussing results of previous MRI and likelihood of her not having any surgical option.  Physical therapy likely will be her only option.  She is refusing TCU.  Called her son who is endorsing TCU.  He is going to be coming to see her tonight to discuss this further.  Care management was " under the impression she was going to go home.  Contacted care management and let them know that she is not a candidate to go home as she is assist of 2.  They will pursue TCU.    Physical Exam/Objective:  Temp:  [97.9  F (36.6  C)-98.5  F (36.9  C)] 98.5  F (36.9  C)  Pulse:  [74-79] 79  Resp:  [16-18] 16  BP: (128-168)/(59-71) 128/62  SpO2:  [90 %-95 %] 90 %  Body mass index is 31.47 kg/m .    GENERAL:  Alert, appears comfortable, in no acute distress, appears stated age   HEAD:  Normocephalic, without obvious abnormality, atraumatic   EYES:  PERRL, conjunctiva/corneas clear, no scleral icterus, EOM's intact   NOSE: Nares normal, septum midline, mucosa normal, no drainage   BACK:   Symmetric, no curvature, ROM normal   LUNGS:   Clear to auscultation bilaterally, no rales, rhonchi, or wheezing, symmetric chest rise on inhalation, respirations unlabored   CHEST WALL:  No tenderness or deformity   HEART:  Regular rate and rhythm, S1 and S2 normal, no murmur, rub, or gallop    ABDOMEN:   Soft, non-tender, bowel sounds active all four quadrants, no masses, no organomegaly, no rebound or guarding   EXTREMITIES: Extremities normal, atraumatic, no cyanosis or edema    SKIN: Dry to touch, no exanthems in the visualized areas   PSYCH: Cooperative, behavior is appropriate      Data reviewed today: I personally reviewed all new medications, labs, imaging/diagnostics reports over the past 24 hours. Pertinent findings include:    Imaging:   No results found for this or any previous visit (from the past 24 hour(s)).    Labs:  Most Recent 3 CBC's:Recent Labs   Lab Test 05/26/22 2045 04/23/22  1012 04/22/22  0935   WBC 9.5 7.5 8.6   HGB 10.3* 10.0* 9.9*   MCV 81 85 85    348 334     Most Recent 3 BMP's:Recent Labs   Lab Test 05/28/22  1639 05/28/22  1623 05/28/22  1314 05/27/22  0802 05/26/22 2045 04/23/22  2058 04/23/22  1748 04/23/22  1208 04/23/22  1012 04/22/22  1936 04/22/22  0935   NA  --   --   --   --  143  --    --   --  140  --  137   POTASSIUM  --   --   --   --  3.7  --  4.1  --  3.2*  --  3.3*   CHLORIDE  --   --   --   --  103  --   --   --  103  --  100   CO2  --   --   --   --  26  --   --   --  32  --  29   BUN  --   --   --   --  21  --   --   --  20  --  30   CR  --   --   --   --  0.83  --   --   --  0.86  --  1.14*   ANIONGAP  --   --   --   --  14  --   --   --  5  --  8   MACY  --   --   --   --  10.2  --   --   --  10.1  --  9.6   * 192* 166*   < > 125   < >  --    < > 124*   < > 201*    < > = values in this interval not displayed.     Most Recent 2 LFT's:Recent Labs   Lab Test 05/26/22 2045 04/22/22  0935   AST 14 16   ALT 17 28   ALKPHOS 77 88   BILITOTAL 0.3 0.4     Most Recent 3 INR's:Recent Labs   Lab Test 05/26/22 2045 09/06/16  0000 07/26/16  0000   INR 1.01 0.9 2.6       Medications:   Personally Reviewed.  Medications       aspirin  81 mg Oral Daily     atorvastatin  40 mg Oral At Bedtime     DULoxetine  60 mg Oral Daily     empagliflozin  25 mg Oral Daily     enoxaparin ANTICOAGULANT  40 mg Subcutaneous Q24H     furosemide  40 mg Oral Daily     glipiZIDE  10 mg Oral BID AC     insulin aspart  1-3 Units Subcutaneous TID AC     insulin aspart  1-3 Units Subcutaneous At Bedtime     lamoTRIgine  100 mg Oral BID     oxybutynin ER  15 mg Oral Daily     pantoprazole  40 mg Oral QAM AC     polyethylene glycol  17 g Oral Daily     predniSONE  6 mg Oral Daily     pregabalin  25 mg Oral BID     Total time 50 minutes with greater than 50% spent in coordination of care discussing plan with patient, son, neurosurgical consultant via telephone.

## 2022-05-28 NOTE — PROGRESS NOTES
Consult received for frequent falls. Patient with known cervical and lumbar stenosis as well as significant LE neuropathy, frequent leg swelling (reason for ED visit), polypharmacy. Discussed historical conversations today with Dr Kaur which included two conversations Dr Velásquez had with patient and her son regarding concern for patient being high risk surgical candidate. Patient and her son declined surgical intervention. Pathology was discussed and patient made aware her physical abilities would like continue to decline. In the last few months patient has also declined any additional injection for back pain. Palliative care has seen the patient in the past. I am not sure neurosurgery has a role in patient's care. Dr Kaur was in agreement. Please call with questions.     Ruth Vallejo, GRACE-CNP  [facility]  O: [phone]

## 2022-05-29 ENCOUNTER — APPOINTMENT (OUTPATIENT)
Dept: OCCUPATIONAL THERAPY | Facility: CLINIC | Age: 82
DRG: 552 | End: 2022-05-29
Payer: MEDICARE

## 2022-05-29 ENCOUNTER — APPOINTMENT (OUTPATIENT)
Dept: PHYSICAL THERAPY | Facility: CLINIC | Age: 82
DRG: 552 | End: 2022-05-29
Payer: MEDICARE

## 2022-05-29 LAB
GLUCOSE BLDC GLUCOMTR-MCNC: 110 MG/DL (ref 70–99)
GLUCOSE BLDC GLUCOMTR-MCNC: 152 MG/DL (ref 70–99)
GLUCOSE BLDC GLUCOMTR-MCNC: 214 MG/DL (ref 70–99)
GLUCOSE BLDC GLUCOMTR-MCNC: 225 MG/DL (ref 70–99)
GLUCOSE BLDC GLUCOMTR-MCNC: 96 MG/DL (ref 70–99)

## 2022-05-29 PROCEDURE — 250N000013 HC RX MED GY IP 250 OP 250 PS 637: Performed by: INTERNAL MEDICINE

## 2022-05-29 PROCEDURE — 120N000001 HC R&B MED SURG/OB

## 2022-05-29 PROCEDURE — 250N000012 HC RX MED GY IP 250 OP 636 PS 637: Performed by: HOSPITALIST

## 2022-05-29 PROCEDURE — 97535 SELF CARE MNGMENT TRAINING: CPT | Mod: GO

## 2022-05-29 PROCEDURE — 250N000011 HC RX IP 250 OP 636: Performed by: INTERNAL MEDICINE

## 2022-05-29 PROCEDURE — 250N000013 HC RX MED GY IP 250 OP 250 PS 637: Performed by: HOSPITALIST

## 2022-05-29 PROCEDURE — 250N000013 HC RX MED GY IP 250 OP 250 PS 637: Performed by: CLINICAL NURSE SPECIALIST

## 2022-05-29 PROCEDURE — 99233 SBSQ HOSP IP/OBS HIGH 50: CPT | Performed by: INTERNAL MEDICINE

## 2022-05-29 PROCEDURE — 250N000012 HC RX MED GY IP 250 OP 636 PS 637: Performed by: INTERNAL MEDICINE

## 2022-05-29 PROCEDURE — 97530 THERAPEUTIC ACTIVITIES: CPT | Mod: GP

## 2022-05-29 PROCEDURE — 97110 THERAPEUTIC EXERCISES: CPT | Mod: GP

## 2022-05-29 RX ORDER — DIPHENHYDRAMINE HCL 25 MG
25 CAPSULE ORAL EVERY 6 HOURS PRN
Status: DISCONTINUED | OUTPATIENT
Start: 2022-05-29 | End: 2022-05-31 | Stop reason: HOSPADM

## 2022-05-29 RX ORDER — PREGABALIN 25 MG/1
25 CAPSULE ORAL DAILY
Status: DISCONTINUED | OUTPATIENT
Start: 2022-05-29 | End: 2022-05-31 | Stop reason: HOSPADM

## 2022-05-29 RX ORDER — PREGABALIN 50 MG/1
50 CAPSULE ORAL AT BEDTIME
Status: DISCONTINUED | OUTPATIENT
Start: 2022-05-29 | End: 2022-05-31 | Stop reason: HOSPADM

## 2022-05-29 RX ADMIN — ENOXAPARIN SODIUM 40 MG: 100 INJECTION SUBCUTANEOUS at 16:52

## 2022-05-29 RX ADMIN — METHOCARBAMOL TABLETS 500 MG: 500 TABLET, COATED ORAL at 10:05

## 2022-05-29 RX ADMIN — GLIPIZIDE 10 MG: 10 TABLET ORAL at 06:11

## 2022-05-29 RX ADMIN — INSULIN ASPART 1 UNITS: 100 INJECTION, SOLUTION INTRAVENOUS; SUBCUTANEOUS at 21:11

## 2022-05-29 RX ADMIN — DULOXETINE HYDROCHLORIDE 60 MG: 60 CAPSULE, DELAYED RELEASE PELLETS ORAL at 10:15

## 2022-05-29 RX ADMIN — METHOCARBAMOL TABLETS 500 MG: 500 TABLET, COATED ORAL at 16:52

## 2022-05-29 RX ADMIN — LAMOTRIGINE 100 MG: 100 TABLET ORAL at 21:15

## 2022-05-29 RX ADMIN — MICONAZOLE NITRATE: 2 POWDER TOPICAL at 20:59

## 2022-05-29 RX ADMIN — PREGABALIN 50 MG: 50 CAPSULE ORAL at 20:58

## 2022-05-29 RX ADMIN — DICLOFENAC SODIUM 4 G: 10 GEL TOPICAL at 06:30

## 2022-05-29 RX ADMIN — INSULIN ASPART 1 UNITS: 100 INJECTION, SOLUTION INTRAVENOUS; SUBCUTANEOUS at 17:11

## 2022-05-29 RX ADMIN — HYDROXYZINE HYDROCHLORIDE 25 MG: 25 TABLET, FILM COATED ORAL at 20:58

## 2022-05-29 RX ADMIN — PREDNISONE 6 MG: 1 TABLET ORAL at 10:05

## 2022-05-29 RX ADMIN — PREGABALIN 25 MG: 25 CAPSULE ORAL at 10:06

## 2022-05-29 RX ADMIN — FUROSEMIDE 40 MG: 40 TABLET ORAL at 10:06

## 2022-05-29 RX ADMIN — LAMOTRIGINE 100 MG: 100 TABLET ORAL at 10:15

## 2022-05-29 RX ADMIN — PANTOPRAZOLE SODIUM 40 MG: 20 TABLET, DELAYED RELEASE ORAL at 06:11

## 2022-05-29 RX ADMIN — OXYBUTYNIN 15 MG: 5 TABLET, FILM COATED, EXTENDED RELEASE ORAL at 10:06

## 2022-05-29 RX ADMIN — OXYCODONE AND ACETAMINOPHEN 1 TABLET: 5; 325 TABLET ORAL at 12:08

## 2022-05-29 RX ADMIN — ATORVASTATIN CALCIUM 40 MG: 40 TABLET, FILM COATED ORAL at 21:05

## 2022-05-29 RX ADMIN — GLIPIZIDE 10 MG: 10 TABLET ORAL at 16:52

## 2022-05-29 RX ADMIN — EMPAGLIFLOZIN 25 MG: 25 TABLET, FILM COATED ORAL at 10:07

## 2022-05-29 RX ADMIN — OXYCODONE AND ACETAMINOPHEN 1 TABLET: 5; 325 TABLET ORAL at 18:38

## 2022-05-29 RX ADMIN — POLYETHYLENE GLYCOL 3350 17 G: 17 POWDER, FOR SOLUTION ORAL at 11:49

## 2022-05-29 RX ADMIN — ASPIRIN 81 MG: 81 TABLET, COATED ORAL at 10:06

## 2022-05-29 RX ADMIN — OXYCODONE AND ACETAMINOPHEN 1 TABLET: 5; 325 TABLET ORAL at 06:11

## 2022-05-29 ASSESSMENT — ACTIVITIES OF DAILY LIVING (ADL)
ADLS_ACUITY_SCORE: 46
ADLS_ACUITY_SCORE: 50
ADLS_ACUITY_SCORE: 50
ADLS_ACUITY_SCORE: 46
ADLS_ACUITY_SCORE: 46
ADLS_ACUITY_SCORE: 50
ADLS_ACUITY_SCORE: 50

## 2022-05-29 NOTE — PROGRESS NOTES
[facility] MEDICINE PROGRESS NOTE      Identification/Summary:  Tonya Yang is a 81 year old female who has a past medical history of chronic pain, chronic lower extremity edema, severe spinal stenosis had steroid injections in her back, follows up with pain clinic, anxiety, Arthritis, Asthma, Bipolar 1 disorder (H), COPD Depression, Diabetes mellitus (H), Dyspepsia, GERD Hard of hearing, Hyperlipidemia, Hypertension, Irritable bowel syndrome, Meniere disease, Neuropathy, multiple recent hospitalizations in the last 2 months presented to the ED with complaints of acute on chronic lower extremity edema, bilateral leg pain, her feet were feeling very cold.  CTA chest abdomen pelvis in the ED there was concern for left peroneal artery occlusion though could be movement related, ABIs were 0.89 on the right, greater than 1 on the left.  Vascular surgery saw her and did not recommend any further work-up at this time.  She does have underlying spinal stenosis which is severe contributing to her recent decline.  Pain team consulted  To adjust her pain meds.  PT OT recommending TCU.    Discussed the case directly with neurosurgery.  She is not a surgical candidate per the report.  Son agrees.  Patient has also had a long discussions with orthopedic spine and they do not feel she is a candidate for surgical intervention.  Family understands that she may not regain the ability to stand on her own and may be wheelchair-bound moving forward.     Assessment and Plan  Acute on chronic lower extremity edema  Acute on chronic leg pain/history of severe spinal stenosis  ? PAD/microvascular disease  Left foot appears purple dorsalis pedis pulses weak, capillary refill about 3 seconds  Discussed with vascular surgery  Start aspirin, statin increase dose from 10 to 40 mg daily, will need LFTs check in 2 to 3 weeks   JOAQUIN normal on the left  Vascular surgery consulted and not a vascular issue  PT/OT ordered,  "she has refused TCU and left AMA from TCU in the past  Continue gabapentin, Cymbalta, Lyrica, prednisone, Robaxin, Percocet somnolence secondary to polypharmacy  Pain team consulted  She has been noncompliant with Lasix at home  Initiate Lasix 40 mg daily.  Needs TCU     CKD  Creatinine normal in the ED  Monitor BMP with Lasix     Type 2 diabetes with neuropathy  Continue PTA medications  Got contrast, hold metformin  On prednisone daily for her hearing loss  Continue Jardiance, glipizide  Blood sugars 90's this morning.     Mood disorder  Confirms that she is taking Lamictal  Can resume at home dose     Normocytic anemia  Iron deficiency anemia  Continue oral iron           # Diabetes, type II: last A1C 8.8 % (Ref range: <=5.6 %)  # Obesity: Estimated body mass index is 31.47 kg/m  as calculated from the following:    Height as of this encounter: 1.651 m (5' 5\").    Weight as of this encounter: 85.8 kg (189 lb 1.6 oz).      Diet: Moderate Consistent Carb (60 g CHO per Meal) Diet  DVT Prophylaxis: Lovenox  Code Status: Full Code    Anticipated possible discharge in 2 or 3 days if TCU available  Disposition Plan   TCU    Interval History/Subjective:  She is requesting her breakfast.  Her leg pain is controlled.  No other shortness of breath, chest pain, urinary or bowel complaints.    Physical Exam/Objective:  Temp:  [97.4  F (36.3  C)-98.5  F (36.9  C)] 97.8  F (36.6  C)  Pulse:  [74-79] 74  Resp:  [16-18] 16  BP: (128-179)/(62-81) 150/67  SpO2:  [90 %-95 %] 92 %  Body mass index is 31.47 kg/m .    GENERAL:  Alert, appears comfortable, in no acute distress, appears stated age   HEAD:  Normocephalic, without obvious abnormality, atraumatic   EYES:  PERRL, conjunctiva/corneas clear, no scleral icterus, EOM's intact   NOSE: Nares normal, septum midline, mucosa normal, no drainage   BACK:   Symmetric, no curvature, ROM normal   LUNGS:   Clear to auscultation bilaterally, no rales, rhonchi, or wheezing, symmetric chest " rise on inhalation, respirations unlabored   CHEST WALL:  No tenderness or deformity   HEART:  Regular rate and rhythm, S1 and S2 normal, no murmur, rub, or gallop    ABDOMEN:   Soft, non-tender, bowel sounds active all four quadrants, no masses, no organomegaly, no rebound or guarding   EXTREMITIES: Extremities normal, atraumatic, no cyanosis or edema    SKIN: Dry to touch, no exanthems in the visualized areas   PSYCH: Cooperative, behavior is appropriate      Data reviewed today: I personally reviewed all new medications, labs, imaging/diagnostics reports over the past 24 hours. Pertinent findings include:    Imaging:   No results found for this or any previous visit (from the past 24 hour(s)).    Labs:  Most Recent 3 CBC's:  Recent Labs   Lab Test 05/26/22 2045 04/23/22  1012 04/22/22  0935   WBC 9.5 7.5 8.6   HGB 10.3* 10.0* 9.9*   MCV 81 85 85    348 334     Most Recent 3 BMP's:  Recent Labs   Lab Test 05/29/22  0748 05/29/22  0606 05/28/22 2112 05/27/22  0802 05/26/22 2045 04/23/22 2058 04/23/22  1748 04/23/22  1208 04/23/22  1012 04/22/22  1936 04/22/22  0935   NA  --   --   --   --  143  --   --   --  140  --  137   POTASSIUM  --   --   --   --  3.7  --  4.1  --  3.2*  --  3.3*   CHLORIDE  --   --   --   --  103  --   --   --  103  --  100   CO2  --   --   --   --  26  --   --   --  32  --  29   BUN  --   --   --   --  21  --   --   --  20  --  30   CR  --   --   --   --  0.83  --   --   --  0.86  --  1.14*   ANIONGAP  --   --   --   --  14  --   --   --  5  --  8   MACY  --   --   --   --  10.2  --   --   --  10.1  --  9.6   GLC 96 110* 137*   < > 125   < >  --    < > 124*   < > 201*    < > = values in this interval not displayed.     Most Recent 2 LFT's:  Recent Labs   Lab Test 05/26/22 2045 04/22/22  0935   AST 14 16   ALT 17 28   ALKPHOS 77 88   BILITOTAL 0.3 0.4     Most Recent 3 INR's:  Recent Labs   Lab Test 05/26/22 2045 09/06/16  0000 07/26/16  0000   INR 1.01 0.9 2.6       Medications:    Personally Reviewed.  Medications       aspirin  81 mg Oral Daily     atorvastatin  40 mg Oral At Bedtime     DULoxetine  60 mg Oral Daily     empagliflozin  25 mg Oral Daily     enoxaparin ANTICOAGULANT  40 mg Subcutaneous Q24H     furosemide  40 mg Oral Daily     glipiZIDE  10 mg Oral BID AC     insulin aspart  1-3 Units Subcutaneous TID AC     insulin aspart  1-3 Units Subcutaneous At Bedtime     lamoTRIgine  100 mg Oral BID     oxybutynin ER  15 mg Oral Daily     pantoprazole  40 mg Oral QAM AC     polyethylene glycol  17 g Oral Daily     predniSONE  6 mg Oral Daily     pregabalin  25 mg Oral Daily     pregabalin  50 mg Oral At Bedtime     Rebecca Sutton MD  Hospitalist

## 2022-05-29 NOTE — PROGRESS NOTES
[facility] ACUTE PAIN SERVICE    ([facility], [facility], [facility])     Date of Admission:  5/26/2022  Date of Consult: 05/27/22  Physician requesting consult: Dr. Sutton   Reason for consult: acute on chronic left leg pain, sedation     Assessment/Plan:     Tonya Yang is a 81 year old female who was admitted on 5/26/2022.   . I was asked to see the patient for acute on chronic left leg pain, sedation. 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  .At baseline she takes Cymbalta, gabapentin, Meloxicam, Lamictal, Robaxin, Percocet, and prednisone.     Hospitalist and neurosurgery notes reviewed, vascular surgery notes reviewed.   Has been receiving Percocet about 3 times a day since admission.   Will increase LYrica dose to 25mg qam and 50mg at bedtime, have room to increase this more with renal function, if helpful.    PLAN: discontinue diazpeam, has not used in 1.5 days, no s/s bzd withdrawal. Would not recommend chronic use of opioids + bzd.  Increase Lyrica slightly today. Will follow up on effectiveness tomorrow.  1) Pain is consistent with chronic home pain, unknown etiology, very multifactorial. The patient's home MME was30 mg daily. .   2)Multimodal Medication Therapy  Topical: does use Voltaren gel at home, add here qid prn  NSAID'S: is on prednisone 6mg daily  Muscle Relaxants: add home Robaxin , unknown benefit but minimal EAGLE risk  Adjuvants: gabapentin 200mg BID, Lyrica 25mg at bedtime: discontinue  gabapentin and trial Lyrica 25mg BID (renally dosed).increase to 25 q am and 50 q hs.  Atarax 25mg TIDPRN  Antidepressants/anxiolytics:Cymbalta 60mg daily  Diazepam 0.5mg bidprn: decrease to 0.3mg dose, stop today.    Opioids: Percocet 5-325mg q6hprn  IV: none  3)Non-medication interventions  Pharmacy consult- appreciate recommendations   Acupuncture consult- as available Mon and Friday : declines, she appears not cognitively ready anyway    Integrative consult - called referral to [phone]   4)Constipation Prophylaxis  5) Follow up   -Opioid prescriber has been [facility]  -Discharge Recommendations - We recommend prescribing the following at the time of discharge: home pain meds. Would a pain clinic be beneficial for patient?    Video-Visit Details  Type of service:  Video Visit  Physician has received verbal consent for a Video Visit from the patient? Yes      Lorraine Smith, Nico  Acute Care Pain Management Program  [facility] (AARON, Emile, [place])   With questions call [phone]  Preference if for [employer] Paging - Ruegg  Click HERE to page Mara

## 2022-05-29 NOTE — PLAN OF CARE
Goal Outcome Evaluation:      Problem: Plan of Care - These are the overarching goals to be used throughout the patient stay.    Goal: Plan of Care Review/Shift Note  Outcome: Ongoing, Progressing     Patient with ongoing N/T to BLE.  Patient c/o little pain at rest but severe pain with movement. Percocet and Robaxin given PRN. Patient up to chair with therapies and up to bedside commode with assist of 2, gait belt, and walker. Patient wants to go home but therapies recommending TCU.

## 2022-05-29 NOTE — PROGRESS NOTES
Care Management Follow Up    Length of Stay (days): 2    Expected Discharge Date: 05/29/2022     Concerns to be Addressed: discharge planning, care coordination/care conferences, other (see comments) (pt resistant to TCU placement)  Son stated having sufficient support at home and expects pt to return there upon d/c.  Patient plan of care discussed at interdisciplinary rounds: Yes    Anticipated Discharge Disposition: Home Care     Anticipated Discharge Services: None  Anticipated Discharge DME: None    Patient/family educated on Medicare website which has current facility and service quality ratings: no (Currently open to a vendor.)  Education Provided on the Discharge Plan:    Patient/Family in Agreement with the Plan: yes    Referrals Placed by CM/SW: Homecare (Current vendor notified.)  Private pay costs discussed: Not applicable    Additional Information:  9:32 AM  RAJ left  for admissions at [facility].  [facility] not accepting admits until 6/2 or 6/3.  [facility] admissions states no beds until 5/31.    3:42 PM  RAJ spoke with son Davie via phone.  SW requesting further TCU options.  Davie states he is going to assess pt today to determine if she can safely return home as opposed to going to TCU.  SW to follow up with Davie tomorrow.      GLORY Baird

## 2022-05-29 NOTE — PLAN OF CARE
Patient is A&Ox4. Patient was able to stand up at edge of bed. She is really wanting to ambulate more.  The purwick was placed yesterday due to increased voiding from lasix, but it was removed overnight due to irritation.  Patient would like to get moving around more, so will be using the bedside commode as needed. Patient is willing to get up to her chair for breakfast, then work with therapies. Blood pressure was elevated, but then rechecked later, and it came down to the 160s systolically. Patient was administered Percocet every 6 hours for her chronic back pain, which was effective.  VSS

## 2022-05-29 NOTE — PLAN OF CARE
Goal Outcome Evaluation:  Pt states her pain is better, after Percocet, will continue to monitor. Pure wick in place. K and mag protocols. Assist of one. VSS.

## 2022-05-30 LAB
ANION GAP SERPL CALCULATED.3IONS-SCNC: 13 MMOL/L (ref 5–18)
BUN SERPL-MCNC: 22 MG/DL (ref 8–28)
CALCIUM SERPL-MCNC: 9.7 MG/DL (ref 8.5–10.5)
CHLORIDE BLD-SCNC: 100 MMOL/L (ref 98–107)
CO2 SERPL-SCNC: 32 MMOL/L (ref 22–31)
CREAT SERPL-MCNC: 0.79 MG/DL (ref 0.6–1.1)
GFR SERPL CREATININE-BSD FRML MDRD: 76 ML/MIN/1.73M2
GLUCOSE BLD-MCNC: 105 MG/DL (ref 70–125)
GLUCOSE BLDC GLUCOMTR-MCNC: 104 MG/DL (ref 70–99)
GLUCOSE BLDC GLUCOMTR-MCNC: 112 MG/DL (ref 70–99)
GLUCOSE BLDC GLUCOMTR-MCNC: 249 MG/DL (ref 70–99)
GLUCOSE BLDC GLUCOMTR-MCNC: 88 MG/DL (ref 70–99)
POTASSIUM BLD-SCNC: 3.4 MMOL/L (ref 3.5–5)
POTASSIUM BLD-SCNC: 4.1 MMOL/L (ref 3.5–5)
SODIUM SERPL-SCNC: 145 MMOL/L (ref 136–145)

## 2022-05-30 PROCEDURE — 250N000013 HC RX MED GY IP 250 OP 250 PS 637: Performed by: INTERNAL MEDICINE

## 2022-05-30 PROCEDURE — 250N000011 HC RX IP 250 OP 636: Performed by: INTERNAL MEDICINE

## 2022-05-30 PROCEDURE — 84132 ASSAY OF SERUM POTASSIUM: CPT | Performed by: INTERNAL MEDICINE

## 2022-05-30 PROCEDURE — 80048 BASIC METABOLIC PNL TOTAL CA: CPT | Performed by: INTERNAL MEDICINE

## 2022-05-30 PROCEDURE — 36415 COLL VENOUS BLD VENIPUNCTURE: CPT | Performed by: INTERNAL MEDICINE

## 2022-05-30 PROCEDURE — 250N000013 HC RX MED GY IP 250 OP 250 PS 637: Performed by: HOSPITALIST

## 2022-05-30 PROCEDURE — 99232 SBSQ HOSP IP/OBS MODERATE 35: CPT | Performed by: INTERNAL MEDICINE

## 2022-05-30 PROCEDURE — 250N000012 HC RX MED GY IP 250 OP 636 PS 637: Performed by: HOSPITALIST

## 2022-05-30 PROCEDURE — 120N000001 HC R&B MED SURG/OB

## 2022-05-30 PROCEDURE — 250N000013 HC RX MED GY IP 250 OP 250 PS 637: Performed by: CLINICAL NURSE SPECIALIST

## 2022-05-30 RX ORDER — POTASSIUM CHLORIDE 1500 MG/1
40 TABLET, EXTENDED RELEASE ORAL ONCE
Status: DISCONTINUED | OUTPATIENT
Start: 2022-05-30 | End: 2022-05-31 | Stop reason: HOSPADM

## 2022-05-30 RX ORDER — MAGNESIUM HYDROXIDE/ALUMINUM HYDROXICE/SIMETHICONE 120; 1200; 1200 MG/30ML; MG/30ML; MG/30ML
30 SUSPENSION ORAL EVERY 4 HOURS PRN
Status: DISCONTINUED | OUTPATIENT
Start: 2022-05-30 | End: 2022-05-31 | Stop reason: HOSPADM

## 2022-05-30 RX ORDER — IODINE/SODIUM IODIDE 2 %
TINCTURE TOPICAL
Status: DISCONTINUED | OUTPATIENT
Start: 2022-05-30 | End: 2022-05-31 | Stop reason: HOSPADM

## 2022-05-30 RX ORDER — POTASSIUM CHLORIDE 1500 MG/1
40 TABLET, EXTENDED RELEASE ORAL ONCE
Status: COMPLETED | OUTPATIENT
Start: 2022-05-30 | End: 2022-05-30

## 2022-05-30 RX ORDER — ATORVASTATIN CALCIUM 10 MG/1
10 TABLET, FILM COATED ORAL AT BEDTIME
Status: DISCONTINUED | OUTPATIENT
Start: 2022-05-30 | End: 2022-05-31 | Stop reason: HOSPADM

## 2022-05-30 RX ADMIN — POLYETHYLENE GLYCOL 3350 17 G: 17 POWDER, FOR SOLUTION ORAL at 08:24

## 2022-05-30 RX ADMIN — EMPAGLIFLOZIN 25 MG: 25 TABLET, FILM COATED ORAL at 08:18

## 2022-05-30 RX ADMIN — OXYBUTYNIN 15 MG: 5 TABLET, FILM COATED, EXTENDED RELEASE ORAL at 08:17

## 2022-05-30 RX ADMIN — OXYCODONE AND ACETAMINOPHEN 1 TABLET: 5; 325 TABLET ORAL at 01:16

## 2022-05-30 RX ADMIN — METHOCARBAMOL TABLETS 500 MG: 500 TABLET, COATED ORAL at 06:39

## 2022-05-30 RX ADMIN — MICONAZOLE NITRATE: 2 POWDER TOPICAL at 10:29

## 2022-05-30 RX ADMIN — GLIPIZIDE 10 MG: 10 TABLET ORAL at 08:18

## 2022-05-30 RX ADMIN — ATORVASTATIN CALCIUM 10 MG: 10 TABLET, FILM COATED ORAL at 22:21

## 2022-05-30 RX ADMIN — DIPHENHYDRAMINE HYDROCHLORIDE 25 MG: 25 CAPSULE ORAL at 20:42

## 2022-05-30 RX ADMIN — GLIPIZIDE 10 MG: 10 TABLET ORAL at 15:32

## 2022-05-30 RX ADMIN — DIAZEPAM 0.5 MG: 5 SOLUTION ORAL at 16:54

## 2022-05-30 RX ADMIN — OXYCODONE AND ACETAMINOPHEN 1 TABLET: 5; 325 TABLET ORAL at 08:31

## 2022-05-30 RX ADMIN — CALAMINE 8% AND ZINC OXIDE 8%: 160 LOTION TOPICAL at 10:20

## 2022-05-30 RX ADMIN — FUROSEMIDE 40 MG: 40 TABLET ORAL at 08:18

## 2022-05-30 RX ADMIN — ENOXAPARIN SODIUM 40 MG: 100 INJECTION SUBCUTANEOUS at 15:32

## 2022-05-30 RX ADMIN — DULOXETINE HYDROCHLORIDE 60 MG: 60 CAPSULE, DELAYED RELEASE PELLETS ORAL at 08:18

## 2022-05-30 RX ADMIN — LAMOTRIGINE 100 MG: 100 TABLET ORAL at 08:17

## 2022-05-30 RX ADMIN — LAMOTRIGINE 100 MG: 100 TABLET ORAL at 20:42

## 2022-05-30 RX ADMIN — PREDNISONE 6 MG: 1 TABLET ORAL at 08:17

## 2022-05-30 RX ADMIN — PANTOPRAZOLE SODIUM 40 MG: 20 TABLET, DELAYED RELEASE ORAL at 06:40

## 2022-05-30 RX ADMIN — PREGABALIN 25 MG: 25 CAPSULE ORAL at 08:18

## 2022-05-30 RX ADMIN — PREGABALIN 50 MG: 50 CAPSULE ORAL at 20:42

## 2022-05-30 RX ADMIN — METHOCARBAMOL TABLETS 500 MG: 500 TABLET, COATED ORAL at 15:31

## 2022-05-30 RX ADMIN — POTASSIUM CHLORIDE 40 MEQ: 1500 TABLET, EXTENDED RELEASE ORAL at 08:18

## 2022-05-30 RX ADMIN — ASPIRIN 81 MG: 81 TABLET, COATED ORAL at 08:18

## 2022-05-30 RX ADMIN — OXYCODONE AND ACETAMINOPHEN 1 TABLET: 5; 325 TABLET ORAL at 22:21

## 2022-05-30 RX ADMIN — OXYCODONE AND ACETAMINOPHEN 1 TABLET: 5; 325 TABLET ORAL at 15:31

## 2022-05-30 ASSESSMENT — ACTIVITIES OF DAILY LIVING (ADL)
ADLS_ACUITY_SCORE: 50

## 2022-05-30 NOTE — PROGRESS NOTES
[facility] ACUTE PAIN SERVICE    ([facility], [facility], [facility])     Date of Admission:  5/26/2022  Date of Consult: 05/27/22  Physician requesting consult: Dr. Sutton   Reason for consult: acute on chronic left leg pain, sedation     Assessment/Plan:     Tonya Yang is a 81 year old female who was admitted on 5/26/2022.   . I was asked to see the patient for acute on chronic left leg pain, sedation. 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  .At baseline she takes Cymbalta, gabapentin, Meloxicam, Lamictal, Robaxin, Percocet, and prednisone.     Hospitalist and neurosurgery notes reviewed, vascular surgery notes reviewed.   Has been receiving Percocet about 3 times a day since admission, this trend continues.   Patient sitting in chair, just took morning meds. Is frustrated she 'isn't getting better'. Voices chronic pain and discomforts. No new acute problems. Unsure if Lyrica increase has been helpful, hasn't noticed any ADEs either. Could increase again tomorrow to 50mg BID>    PLAN:   1) Pain is consistent with chronic home pain, unknown etiology, very multifactorial. The patient's home MME was30 mg daily. .   2)Multimodal Medication Therapy  Topical: does use Voltaren gel at home, add here qid prn  NSAID'S: is on prednisone 6mg daily  Muscle Relaxants: add home Robaxin , unknown benefit but minimal EAGLE risk  Adjuvants: gabapentin 200mg BID, Lyrica 25mg at bedtime: discontinue gabapentin and trial Lyrica  25mg BID (renally dosed).increase to 25 q am and 50 q hs.  Atarax 25mg TIDPRN  Antidepressants/anxiolytics:Cymbalta 60mg daily  Diazepam 0.5mg bidprn: decrease to 0.3mg dose, stop today.    Opioids: Percocet 5-325mg q6hprn  IV: none  3)Non-medication interventions  Pharmacy consult- appreciate recommendations   Acupuncture consult- as available Mon and Friday : declines, she appears not cognitively ready anyway    Integrative consult - called referral to [phone]   4)Constipation Prophylaxis  5) Follow up   -Opioid prescriber has been [facility]  -Discharge Recommendations - We recommend prescribing the following at the time of discharge: home pain meds. NO diazepam. Would NOT recommend concurrent use of opioids and benzos at home for her.    Would a pain clinic be beneficial for patient? (she is thinking about this)      Lorraine Smith, PHarmD  Acute Care Pain Management Program  [facility] (AARON, Emile, [place])   With questions call [phone]  Preference if for [employer] Paging - Ruegg  Click HERE to page Mara

## 2022-05-30 NOTE — PROGRESS NOTES
[facility] MEDICINE PROGRESS NOTE      Identification/Summary:  Tonya Yang is a 81 year old female past medical history of chronic pain, chronic lower extremity edema, severe spinal stenosis had steroid injections in her back, follows up with pain clinic, anxiety, Arthritis, Asthma, Bipolar 1 disorder (H), COPD Depression, Diabetes mellitus (H), Dyspepsia, GERD Hard of hearing, Hyperlipidemia, Hypertension, Irritable bowel syndrome, Meniere disease, Neuropathy, multiple recent hospitalizations in the last 2 months presented to the ED with complaints of acute on chronic lower extremity edema, bilateral leg pain, her feet were feeling very cold. CTA chest abdomen pelvis in the ED there was concern for ?left peroneal artery occlusion felt movement related, ABIs were 0.89 on the right, greater than 1 on the left.  Vascular surgery saw her and did not recommend any further work-up at this time.  She does have underlying spinal stenosis which is severe contributing to her recent decline.  Pain team consulted  adjusting her pain meds.  PT OT recommending TCU.    Dr. Kaur discussed the case directly with neurosurgery 5/28.  She is not a surgical candidate per the report.  Son agrees.  Patient has also had a long discussions with orthopedic spine and they do not feel she is a candidate for surgical intervention.  Family understands that she may not regain the ability to stand on her own and may be wheelchair-bound moving forward.  Per care manager patient son is going to visit her today to decide on whether she can come home with home care.  PT OT still recommending TCU at discharge      Assessment and Plan  Acute on chronic lower extremity edema  Acute on chronic leg pain/history of severe spinal stenosis  ? PAD  Left foot appears purple dorsalis pedis pulses weak, capillary refill about 3 seconds  JOAQUIN normal on the left, mildly low on right  Vascular surgery consulted and not a vascular  "issue  PT/OT ordered, she has refused TCU and left AMA from TCU in the past  Continue gabapentin, Cymbalta, Lyrica, prednisone, Robaxin, Percocet somnolence secondary to polypharmacy  Pain team consulted, Lyrica dose increased  She has been noncompliant with Lasix at home  Initiated Lasix 40 mg daily.  Leg swelling is much improved.  Needs TCU  Was seen by spine surgery in the recent past  Not a surgical candidate     CKD  Creatinine normal in the ED  Monitor BMP with Lasix     Type 2 diabetes with neuropathy  Continue PTA medications  Got contrast, hold metformin  On prednisone daily for her hearing loss  Continue Jardiance, glipizide     Mood disorder  Confirms that she is taking Lamictal  Can resume at home dose     Normocytic anemia  Iron deficiency anemia  Continue oral iron           # Diabetes, type II: last A1C 8.8 % (Ref range: <=5.6 %)  # Obesity: Estimated body mass index is 31.47 kg/m  as calculated from the following:    Height as of this encounter: 1.651 m (5' 5\").    Weight as of this encounter: 85.8 kg (189 lb 1.6 oz).      Diet: Moderate Consistent Carb (60 g CHO per Meal) Diet  DVT Prophylaxis: Lovenox  Code Status: Full Code    Anticipated possible discharge in 1-2 days if TCU available  Disposition Plan   TCU    Interval History/Subjective:  Neck swelling is much improved.  Requesting her pain meds to be increased to every 4 hours. appears comfortable. no other shortness of breath, chest pain, urinary or bowel complaints.    Physical Exam/Objective:  Temp:  [97.2  F (36.2  C)-97.9  F (36.6  C)] 97.9  F (36.6  C)  Pulse:  [77-98] 82  Resp:  [16-17] 17  BP: (135-175)/(62-79) 135/62  SpO2:  [93 %-94 %] 93 %  Body mass index is 31.47 kg/m .    GENERAL:  Alert, appears comfortable, in no acute distress, appears stated age   HEAD:  Normocephalic, without obvious abnormality, atraumatic   EYES:  PERRL, conjunctiva/corneas clear, no scleral icterus, EOM's intact   NOSE: Nares normal, septum midline, " mucosa normal, no drainage   BACK:   Symmetric, no curvature, ROM normal   LUNGS:   Clear to auscultation bilaterally, no rales, rhonchi, or wheezing, symmetric chest rise on inhalation, respirations unlabored   CHEST WALL:  No tenderness or deformity   HEART:  Regular rate and rhythm, S1 and S2 normal, no murmur, rub, or gallop    ABDOMEN:   Soft, non-tender, bowel sounds active all four quadrants, no masses, no organomegaly, no rebound or guarding   EXTREMITIES:  Leg edema much improved.  Good capillary refill in both feet.   SKIN: Dry to touch, no exanthems in the visualized areas   PSYCH: Cooperative, behavior is appropriate      Data reviewed today: I personally reviewed all new medications, labs, imaging/diagnostics reports over the past 24 hours. Pertinent findings include:    Imaging:   No results found for this or any previous visit (from the past 24 hour(s)).    Labs:  Most Recent 3 CBC's:  Recent Labs   Lab Test 05/26/22 2045 04/23/22  1012 04/22/22  0935   WBC 9.5 7.5 8.6   HGB 10.3* 10.0* 9.9*   MCV 81 85 85    348 334     Most Recent 3 BMP's:  Recent Labs   Lab Test 05/30/22  1211 05/30/22  0815 05/30/22  0514 05/29/22  2110 05/27/22  0802 05/26/22 2045 04/23/22  1208 04/23/22  1012   NA  --   --  145  --   --  143  --  140   POTASSIUM 4.1  --  3.4*  --   --  3.7   < > 3.2*   CHLORIDE  --   --  100  --   --  103  --  103   CO2  --   --  32*  --   --  26  --  32   BUN  --   --  22  --   --  21  --  20   CR  --   --  0.79  --   --  0.83  --  0.86   ANIONGAP  --   --  13  --   --  14  --  5   MACY  --   --  9.7  --   --  10.2  --  10.1   GLC  --  104* 105 214*   < > 125   < > 124*    < > = values in this interval not displayed.     Most Recent 2 LFT's:  Recent Labs   Lab Test 05/26/22 2045 04/22/22  0935   AST 14 16   ALT 17 28   ALKPHOS 77 88   BILITOTAL 0.3 0.4     Most Recent 3 INR's:  Recent Labs   Lab Test 05/26/22  2045 09/06/16  0000 07/26/16  0000   INR 1.01 0.9 2.6       Medications:    Personally Reviewed.  Medications       aspirin  81 mg Oral Daily     atorvastatin  40 mg Oral At Bedtime     DULoxetine  60 mg Oral Daily     empagliflozin  25 mg Oral Daily     enoxaparin ANTICOAGULANT  40 mg Subcutaneous Q24H     furosemide  40 mg Oral Daily     glipiZIDE  10 mg Oral BID AC     insulin aspart  1-3 Units Subcutaneous TID AC     insulin aspart  1-3 Units Subcutaneous At Bedtime     lamoTRIgine  100 mg Oral BID     oxybutynin ER  15 mg Oral Daily     pantoprazole  40 mg Oral QAM AC     polyethylene glycol  17 g Oral Daily     potassium chloride  40 mEq Oral Once     predniSONE  6 mg Oral Daily     pregabalin  25 mg Oral Daily     pregabalin  50 mg Oral At Bedtime     Rebecca Sutton MD  Hospitalist

## 2022-05-30 NOTE — PROVIDER NOTIFICATION
Text paged Dr. Barker, misty GOMEZ, requesting something for acid reflux.  Waiting for a response.

## 2022-05-30 NOTE — PLAN OF CARE
Problem: Diabetes Comorbidity  Goal: Blood Glucose Level Within Targeted Range  Outcome: Ongoing, Progressing     Problem: Pain Chronic (Persistent)  Goal: Acceptable Pain Control and Functional Ability  Outcome: Ongoing, Progressing  Intervention: Develop Pain Management Plan  Recent Flowsheet Documentation  Taken 5/30/2022 0831 by Apolonia Bhatt RN  Pain Management Interventions: medication (see MAR)   Goal Outcome Evaluation:    Pt c/o of back and bilateral shoulder, arm and leg pain.  She states her pain is worse with her legs down.  Encouraged pt to elevate her legs and she agreed.  This afternoon pt was half way through her lunch before we got her blood sugar which was 249.  Pt refused her insulin because of this.  Pt was very tearful this am because of every thing that is going on with her and how it is getting worse.  Therapeutic communication utilized.  Pt feeling better this afternoon.

## 2022-05-30 NOTE — PROGRESS NOTES
Care Management Follow Up    Length of Stay (days): 3    Expected Discharge Date: 05/31/2022     Concerns to be Addressed: discharge planning, care coordination/care conferences, other (see comments) (pt resistant to TCU placement)  Son stated having sufficient support at home and expects pt to return there upon d/c.  Patient plan of care discussed at interdisciplinary rounds: Yes    Anticipated Discharge Disposition: Home Care     Anticipated Discharge Services: None  Anticipated Discharge DME: None    Patient/family educated on Medicare website which has current facility and service quality ratings: no (Currently open to a vendor.)  Education Provided on the Discharge Plan:    Patient/Family in Agreement with the Plan: yes    Referrals Placed by CM/SW: Homecare (Current vendor notified.)  Private pay costs discussed: Not applicable    Additional Information:  2:03 PM  RAJ spoke with son Davie via phone.  He is declining TCU at this time.  He wants to take pt home tomorrow 5/31 with continued HC services (RN/PT/PCA) with [facility].  He will transport pt at 10am on 5/31.      GLORY Baird

## 2022-05-30 NOTE — PLAN OF CARE
Goal Outcome Evaluation:    Problem: Plan of Care - These are the overarching goals to be used throughout the patient stay.    Goal: Optimal Comfort and Wellbeing  Intervention: Monitor Pain and Promote Comfort  Recent Flowsheet Documentation  Taken 5/30/2022 0047 by Latricia Huang RN  Pain Management Interventions: medication (see MAR)       Patient is alert and oriented. Able to make needs known. C/O low back pain. Pain controlled with PRN Percocet. Up with A2 to bedside commode. Voiding. Call light within reach. Bed alarm activated for safety.

## 2022-05-30 NOTE — PLAN OF CARE
Patient alert and oriented. VSS. Expressed frustration and was sad and tearful regarding current health status and limitation in mobility; provided with lots of encouragement and much emotional support. Chronic pain controlled with current pain regimen. Able to get up and pivot transfer to BSC with 2 assist and walker. Expressed not wanting to go to TCU.     Problem: Risk for Delirium  Goal: Optimal Coping  Outcome: Ongoing, Progressing     Problem: Pain Chronic (Persistent)  Goal: Acceptable Pain Control and Functional Ability  Outcome: Ongoing, Progressing  Intervention: Manage Persistent Pain  Recent Flowsheet Documentation  Taken 5/29/2022 1700 by Kaleigh Patel, RN  Medication Review/Management: medications reviewed   Goal Outcome Evaluation:

## 2022-05-31 ENCOUNTER — APPOINTMENT (OUTPATIENT)
Dept: OCCUPATIONAL THERAPY | Facility: CLINIC | Age: 82
DRG: 552 | End: 2022-05-31
Payer: MEDICARE

## 2022-05-31 VITALS
RESPIRATION RATE: 17 BRPM | WEIGHT: 189.1 LBS | OXYGEN SATURATION: 98 % | HEIGHT: 65 IN | BODY MASS INDEX: 31.5 KG/M2 | SYSTOLIC BLOOD PRESSURE: 148 MMHG | TEMPERATURE: 98 F | DIASTOLIC BLOOD PRESSURE: 71 MMHG | HEART RATE: 70 BPM

## 2022-05-31 PROBLEM — M48.00 SPINAL STENOSIS: Status: ACTIVE | Noted: 2022-05-31

## 2022-05-31 PROBLEM — R60.0 LOWER EXTREMITY EDEMA: Status: ACTIVE | Noted: 2022-05-31

## 2022-05-31 LAB
GLUCOSE BLDC GLUCOMTR-MCNC: 107 MG/DL (ref 70–99)
GLUCOSE BLDC GLUCOMTR-MCNC: 109 MG/DL (ref 70–99)

## 2022-05-31 PROCEDURE — 250N000013 HC RX MED GY IP 250 OP 250 PS 637: Performed by: FAMILY MEDICINE

## 2022-05-31 PROCEDURE — 250N000013 HC RX MED GY IP 250 OP 250 PS 637: Performed by: HOSPITALIST

## 2022-05-31 PROCEDURE — 250N000012 HC RX MED GY IP 250 OP 636 PS 637: Performed by: HOSPITALIST

## 2022-05-31 PROCEDURE — 99222 1ST HOSP IP/OBS MODERATE 55: CPT | Performed by: PAIN MEDICINE

## 2022-05-31 PROCEDURE — 250N000013 HC RX MED GY IP 250 OP 250 PS 637: Performed by: CLINICAL NURSE SPECIALIST

## 2022-05-31 PROCEDURE — 250N000013 HC RX MED GY IP 250 OP 250 PS 637: Performed by: INTERNAL MEDICINE

## 2022-05-31 PROCEDURE — 99239 HOSP IP/OBS DSCHRG MGMT >30: CPT | Performed by: FAMILY MEDICINE

## 2022-05-31 PROCEDURE — 97535 SELF CARE MNGMENT TRAINING: CPT | Mod: GO

## 2022-05-31 RX ORDER — METHOCARBAMOL 500 MG/1
500 TABLET, FILM COATED ORAL 3 TIMES DAILY PRN
Qty: 60 TABLET | Refills: 0 | Status: SHIPPED | OUTPATIENT
Start: 2022-05-31 | End: 2024-01-28

## 2022-05-31 RX ORDER — IODINE/SODIUM IODIDE 2 %
TINCTURE TOPICAL
Qty: 118 ML | Refills: 0 | Status: SHIPPED | OUTPATIENT
Start: 2022-05-31 | End: 2024-01-28

## 2022-05-31 RX ORDER — AMOXICILLIN 250 MG
1 CAPSULE ORAL 2 TIMES DAILY
Status: DISCONTINUED | OUTPATIENT
Start: 2022-05-31 | End: 2022-05-31 | Stop reason: HOSPADM

## 2022-05-31 RX ORDER — OXYCODONE AND ACETAMINOPHEN 5; 325 MG/1; MG/1
1 TABLET ORAL EVERY 6 HOURS PRN
Qty: 20 TABLET | Refills: 0 | Status: ON HOLD | OUTPATIENT
Start: 2022-05-31 | End: 2023-03-22

## 2022-05-31 RX ORDER — POLYETHYLENE GLYCOL 3350 17 G/17G
17 POWDER, FOR SOLUTION ORAL DAILY
Status: DISCONTINUED | OUTPATIENT
Start: 2022-05-31 | End: 2022-05-31

## 2022-05-31 RX ORDER — BISACODYL 10 MG
10 SUPPOSITORY, RECTAL RECTAL DAILY PRN
Status: DISCONTINUED | OUTPATIENT
Start: 2022-05-31 | End: 2022-05-31 | Stop reason: HOSPADM

## 2022-05-31 RX ORDER — FUROSEMIDE 40 MG
40 TABLET ORAL DAILY
Qty: 30 TABLET | Refills: 0 | Status: SHIPPED | OUTPATIENT
Start: 2022-05-31 | End: 2023-03-21

## 2022-05-31 RX ORDER — PANTOPRAZOLE SODIUM 40 MG/1
40 TABLET, DELAYED RELEASE ORAL
Qty: 30 TABLET | Refills: 0 | Status: SHIPPED | OUTPATIENT
Start: 2022-05-31 | End: 2023-03-21

## 2022-05-31 RX ORDER — POTASSIUM CHLORIDE 750 MG/1
10 TABLET, EXTENDED RELEASE ORAL DAILY
Qty: 30 TABLET | Refills: 0 | Status: SHIPPED | OUTPATIENT
Start: 2022-05-31 | End: 2023-03-21

## 2022-05-31 RX ORDER — PREGABALIN 25 MG/1
25 CAPSULE ORAL DAILY
Qty: 30 CAPSULE | Refills: 0 | Status: SHIPPED | OUTPATIENT
Start: 2022-05-31 | End: 2023-03-21

## 2022-05-31 RX ORDER — PREGABALIN 50 MG/1
50 CAPSULE ORAL AT BEDTIME
Qty: 30 CAPSULE | Refills: 0 | Status: SHIPPED | OUTPATIENT
Start: 2022-05-31 | End: 2023-03-21

## 2022-05-31 RX ADMIN — OXYCODONE AND ACETAMINOPHEN 1 TABLET: 5; 325 TABLET ORAL at 05:00

## 2022-05-31 RX ADMIN — PREGABALIN 25 MG: 25 CAPSULE ORAL at 09:18

## 2022-05-31 RX ADMIN — GLIPIZIDE 10 MG: 10 TABLET ORAL at 09:17

## 2022-05-31 RX ADMIN — MICONAZOLE NITRATE: 2 POWDER TOPICAL at 06:31

## 2022-05-31 RX ADMIN — EMPAGLIFLOZIN 25 MG: 25 TABLET, FILM COATED ORAL at 09:18

## 2022-05-31 RX ADMIN — SENNOSIDES AND DOCUSATE SODIUM 1 TABLET: 50; 8.6 TABLET ORAL at 09:18

## 2022-05-31 RX ADMIN — PREDNISONE 6 MG: 1 TABLET ORAL at 09:18

## 2022-05-31 RX ADMIN — POLYETHYLENE GLYCOL 3350 17 G: 17 POWDER, FOR SOLUTION ORAL at 09:16

## 2022-05-31 RX ADMIN — FUROSEMIDE 40 MG: 40 TABLET ORAL at 09:19

## 2022-05-31 RX ADMIN — METHOCARBAMOL TABLETS 500 MG: 500 TABLET, COATED ORAL at 06:15

## 2022-05-31 RX ADMIN — LAMOTRIGINE 100 MG: 100 TABLET ORAL at 09:18

## 2022-05-31 RX ADMIN — DULOXETINE HYDROCHLORIDE 60 MG: 60 CAPSULE, DELAYED RELEASE PELLETS ORAL at 09:18

## 2022-05-31 RX ADMIN — DIAZEPAM 0.5 MG: 5 SOLUTION ORAL at 09:16

## 2022-05-31 RX ADMIN — CALAMINE 8% AND ZINC OXIDE 8%: 160 LOTION TOPICAL at 06:29

## 2022-05-31 RX ADMIN — OXYBUTYNIN 15 MG: 5 TABLET, FILM COATED, EXTENDED RELEASE ORAL at 09:18

## 2022-05-31 RX ADMIN — PANTOPRAZOLE SODIUM 40 MG: 20 TABLET, DELAYED RELEASE ORAL at 09:17

## 2022-05-31 ASSESSMENT — ACTIVITIES OF DAILY LIVING (ADL)
ADLS_ACUITY_SCORE: 50

## 2022-05-31 NOTE — PLAN OF CARE
Problem: Pain Chronic (Persistent)  Goal: Acceptable Pain Control and Functional Ability  Outcome: Met  Intervention: Develop Pain Management Plan  Recent Flowsheet Documentation  Taken 5/31/2022 0900 by Danni Fuentes RN  Pain Management Interventions:   emotional support   pillow support provided   essential oils   Goal Outcome Evaluation:    Pt is going to discharge home with home care today since she is refusing TCU.  Pt has family support with her son.  Pt is anxiety and is depressed about her future outcome.  Pt is going to follow up with her spine clinic and palliative information was given to her at discharge.  Pt and son present at time of discharge.  Both verbalize discharge and follow up instructions.

## 2022-05-31 NOTE — DISCHARGE SUMMARY
[facility] MEDICINE  DISCHARGE SUMMARY     Primary Care Physician: Ananya Mclean  Admission Date: 5/26/2022   Discharge Provider: Felipe Philippe MD Discharge Date: 5/31/2022   Diet:   Active Diet and Nourishment Order   Procedures     Moderate Consistent Carb (60 g CHO per Meal) Diet     Diet     Code Status: Full Code   Activity: DCACTIVITY: Activity as tolerated  Ambulate with adaptive equipment.  Fall precautions.      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        PROCEDURES ( this hospitalization only)      None    RECOMMENDATIONS TO OUTPATIENT PROVIDER FOR F/U VISIT   Follow-up Appointments     Follow-up and recommended labs and tests      1.  Follow-up with primary care provider within 1 week.  2.  Follow-up with [facility] as previously scheduled.  3.  Follow-up with palliative care.  Give phone number to family.  4.  Follow-up with outpatient pain clinic.             DISPOSITION     Home with home care    SUMMARY OF HOSPITAL COURSE:      Tonya Yang is a 81 year old female past medical history of chronic pain, chronic lower extremity edema, severe spinal stenosis had steroid injections in her back, follows up with pain clinic, anxiety, Arthritis, Asthma, Bipolar 1 disorder (H),  COPD Depression, Diabetes mellitus (H), Dyspepsia, GERD Hard of hearing, Hyperlipidemia, Hypertension, Irritable bowel syndrome, Meniere disease, Neuropathy, multiple recent hospitalizations in the last 2 months.  5/26 presented to the ED with complaints of acute on chronic lower extremity edema, bilateral leg pain, her feet were feeling very cold.  Admitted.      1.  Vascular.  CTA chest abdomen pelvis in the ED there was concern for ?left peroneal artery occlusion felt movement related, ABIs were 0.89 on the right, greater than 1 on the left.  Vascular surgery saw her and did not recommend any further work-up at this time.    Lasix dose increased for her lower extremity edema.    2.  Orthospine.  She does have underlying spinal stenosis which is severe contributing to her recent decline.  Pain team consulted  adjusting her pain meds.  PT OT recommending TCU.  Case discussed directly with neurosurgery 5/28.  She is not a surgical candidate per the report.  Son agrees.  Patient has also had a long discussions with orthopedic spine and they do not feel she is a candidate for surgical intervention.  Family understands that she may not regain the ability to stand on her own and may be wheelchair-bound moving forward.  Patient declines TCU admission.  Family agreeable to discharge home with home care services.  Recommend close follow-up with palliative outpatient clinic and pain team.    Started on potassium for some hypokalemia.  Medically otherwise stable.    Discharge Medications with Med changes:     Current Discharge Medication List      START taking these medications    Details   calamine 8-8 % lotion Apply topically every hour as needed for itching  Qty: 118 mL, Refills: 0    Associated Diagnoses: Spinal stenosis, unspecified spinal region      pantoprazole (PROTONIX) 40 MG EC tablet Take 1 tablet (40 mg) by mouth every morning (before breakfast)  Qty: 30 tablet, Refills: 0    Associated Diagnoses:  Gastroesophageal reflux disease without esophagitis      potassium chloride ER (KLOR-CON M) 10 MEQ CR tablet Take 1 tablet (10 mEq) by mouth daily  Qty: 30 tablet, Refills: 0    Associated Diagnoses: Hypokalemia         CONTINUE these medications which have CHANGED    Details   furosemide (LASIX) 40 MG tablet Take 1 tablet (40 mg) by mouth daily  Qty: 30 tablet, Refills: 0    Associated Diagnoses: Lower extremity edema      methocarbamol (ROBAXIN) 500 MG tablet Take 1 tablet (500 mg) by mouth 3 times daily as needed for muscle spasms  Qty: 60 tablet, Refills: 0    Associated Diagnoses: Spinal stenosis, unspecified spinal region      oxyCODONE-acetaminophen (PERCOCET) 5-325 MG tablet Take 1 tablet by mouth every 6 hours as needed for severe pain (Max 4 tablets daily)  Qty: 20 tablet, Refills: 0    Associated Diagnoses: Spinal stenosis, unspecified spinal region      !! pregabalin (LYRICA) 25 MG capsule Take 1 capsule (25 mg) by mouth daily  Qty: 30 capsule, Refills: 0    Associated Diagnoses: Spinal stenosis, unspecified spinal region      !! pregabalin (LYRICA) 50 MG capsule Take 1 capsule (50 mg) by mouth At Bedtime  Qty: 30 capsule, Refills: 0    Associated Diagnoses: Spinal stenosis, unspecified spinal region       !! - Potential duplicate medications found. Please discuss with provider.      CONTINUE these medications which have NOT CHANGED    Details   atorvastatin (LIPITOR) 10 MG tablet Take 10 mg by mouth At Bedtime      cyanocobalamin 1000 MCG SUBL Place 1,000 mcg under the tongue daily      DIAZEPAM 5 MG/5ML solution Take 0.5 mg by mouth 2 times daily as needed for anxiety      diclofenac (VOLTAREN) 1 % topical gel Apply 4 g topically 4 times daily  Qty: 150 g, Refills: 0    Associated Diagnoses: Chronic left-sided low back pain with left-sided sciatica      DULoxetine (CYMBALTA) 60 MG EC capsule TAKE 1 CAPSULE (60 MG) BY MOUTH DAILY  Qty: 90 capsule, Refills: 1    Comments: Needs to be seen by  March  Associated Diagnoses: Depressed bipolar I disorder in remission (H)      ferrous sulfate (FEROSUL) 325 (65 Fe) MG tablet Take 1 tablet (325 mg) by mouth daily      glipiZIDE (GLUCOTROL) 10 MG tablet Take 1 tablet (10 mg) by mouth 2 times daily (before meals)    Associated Diagnoses: Type 2 diabetes mellitus with diabetic nephropathy, without long-term current use of insulin (H)      hydrOXYzine (ATARAX) 25 MG tablet Take 25 mg by mouth 3 times daily as needed for anxiety      JARDIANCE 25 MG TABS tablet Take 25 mg by mouth daily       lamoTRIgine (LAMICTAL) 100 MG tablet Take 100 mg by mouth 2 times daily      melatonin 1 MG TABS tablet Take 1 tablet (1 mg) by mouth nightly as needed for sleep      metFORMIN (GLUCOPHAGE) 500 MG tablet Take 750 mg by mouth daily (with dinner)      miconazole (MICATIN) 2 % external powder Apply topically 2 times daily as needed To the skin fold      mineral oil-hydrophilic petrolatum (AQUAPHOR) external ointment Apply topically 3 times daily as needed To the dry skin      oxybutynin ER (DITROPAN XL) 15 MG 24 hr tablet Take 15 mg by mouth daily      polyethylene glycol (MIRALAX) 17 GM/Dose powder Take 17 g by mouth daily  Qty: 510 g, Refills: 0    Associated Diagnoses: Chronic left-sided low back pain with left-sided sciatica      predniSONE (DELTASONE) 1 MG tablet Take 6 mg by mouth daily       senna-docusate (SENOKOT-S/PERICOLACE) 8.6-50 MG tablet Take 1 tablet by mouth 2 times daily  Qty: 30 tablet, Refills: 0    Associated Diagnoses: Chronic left-sided low back pain with left-sided sciatica      triamcinolone (KENALOG) 0.1 % external cream Apply topically 2 times daily as needed       vitamin D3 (CHOLECALCIFEROL) 50 mcg (2000 units) tablet Take 3 tablets by mouth daily      acetaminophen (TYLENOL) 325 MG tablet Take 1-2 tablets (325-650 mg) by mouth every 6 hours as needed for mild pain    Associated Diagnoses: Chronic midline low back pain with right-sided sciatica; Other  chronic pain      blood glucose (ACCU-CHEK FASTCLIX) lancing device FOR TESTING ONCE DAILY. DX  E11.9 TYPE 2 DIABETES      !! blood glucose (CONTOUR TEST) test strip TESTING EVERY DAY DX  E11.9      !! CONTOUR NEXT TEST test strip TESTING EVERY DAY DX  E11.9      order for DME Equipment being ordered: wrist brace  Qty: 1 Device, Refills: 0    Associated Diagnoses: Tenosynovitis, de Quervain       !! - Potential duplicate medications found. Please discuss with provider.      STOP taking these medications       gabapentin (NEURONTIN) 100 MG capsule Comments:   Reason for Stopping:         omeprazole (PRILOSEC) 20 MG DR capsule Comments:   Reason for Stopping:                 Rationale for medication changes:      Multiple adjustments to pain medication to help with her spinal stenosis.  Protonix started for better reflux control.  Lasix increased for lower extremity edema.  Potassium for hypokalemia treatment.      Consults     VASCULAR SURGERY IP CONSULT  PHYSICAL THERAPY ADULT IP CONSULT  OCCUPATIONAL THERAPY ADULT IP CONSULT  PAIN MANAGEMENT ADULT IP CONSULT  CARE MANAGEMENT / SOCIAL WORK IP CONSULT  NEUROSURGERY IP CONSULT      Immunizations given this encounter     Most Recent Immunizations   Administered Date(s) Administered     COVID-19,PF,Pfizer (12+ Yrs) 03/29/2021     Influenza (High Dose) 3 valent vaccine 10/01/2017     Influenza (IIV3) PF 10/08/2012     Influenza Vaccine Im 4yrs+ 4 Valent CCIIV4 10/05/2021     Pneumo Conj 13-V (2010&after) 02/18/2016     Pneumococcal 23 valent 10/02/2014     TDAP Vaccine (Adacel) 07/18/2013           Anticoagulation Information      Recent INR results:   Recent Labs   Lab 05/26/22 2045   INR 1.01     Warfarin doses (if applicable) or name of other anticoagulant: na      SIGNIFICANT IMAGING FINDINGS     Results for orders placed or performed during the hospital encounter of 05/26/22   CTA Chest Abdomen Pelvis Runoff w Contrast    Impression    IMPRESSION:    1. There is  some loss of detail given patient motion especially in the lower extremities.    2. CTA of the left lower extremity shows loss of the peroneal artery in the midcalf region with no significant reconstitution. There is loss of the anterior tibial artery in the lower calf region. I believe the posterior tibial artery extend into the   foot although this is difficult to determine given motion. There is soft tissue edema seen in both lower extremities greatest in the left lower extremity.    3.There is localized narrowing seen involving the hepatic flexure of the colon this likely represents an area of spasm since there is no evidence for stranding of the adjacent fat to suggest colitis.    US JOAQUIN Doppler No Exercise 1-2 Levels Bilateral    Impression    IMPRESSION:  1.  RIGHT LOWER EXTREMITY: JOAQUIN at rest is borderline abnormal. Right digital index is decreased however digital pressures are adequate for healing potential.  2.  LEFT LOWER EXTREMITY: JOAQUIN at rest is normal. Left digital index is decreased however digital pressures are adequate for healing potential.       SIGNIFICANT LABORATORY FINDINGS     Most Recent 3 CBC's:Recent Labs   Lab Test 05/26/22 2045 04/23/22  1012 04/22/22  0935   WBC 9.5 7.5 8.6   HGB 10.3* 10.0* 9.9*   MCV 81 85 85    348 334     Most Recent 3 BMP's:Recent Labs   Lab Test 05/31/22  0847 05/31/22  0505 05/30/22 2025 05/30/22  1310 05/30/22  1211 05/30/22  0815 05/30/22  0514 05/27/22  0802 05/26/22 2045 04/23/22  1208 04/23/22  1012   NA  --   --   --   --   --   --  145  --  143  --  140   POTASSIUM  --   --   --   --  4.1  --  3.4*  --  3.7   < > 3.2*   CHLORIDE  --   --   --   --   --   --  100  --  103  --  103   CO2  --   --   --   --   --   --  32*  --  26  --  32   BUN  --   --   --   --   --   --  22  --  21  --  20   CR  --   --   --   --   --   --  0.79  --  0.83  --  0.86   ANIONGAP  --   --   --   --   --   --  13  --  14  --  5   MACY  --   --   --   --   --   --  9.7   --  10.2  --  10.1   * 109* 112*   < >  --    < > 105   < > 125   < > 124*    < > = values in this interval not displayed.     Most Recent 2 LFT's:Recent Labs   Lab Test 05/26/22 2045 04/22/22  0935   AST 14 16   ALT 17 28   ALKPHOS 77 88   BILITOTAL 0.3 0.4     Most Recent 3 INR's:Recent Labs   Lab Test 05/26/22 2045 09/06/16  0000 07/26/16  0000   INR 1.01 0.9 2.6         Discharge Orders        Home care nursing referral      Reason for your hospital stay    Severe spinal stenosis     Follow-up and recommended labs and tests    1.  Follow-up with primary care provider within 1 week.  2.  Follow-up with [facility] spine as previously scheduled.  3.  Follow-up with palliative care.  Give phone number to family.  4.  Follow-up with outpatient pain clinic.     Activity    Your activity upon discharge: activity as tolerated.  Fall precautions.  Ambulate with adaptive equipment.     Monitor and record    blood glucose 4 times a day, before meals and at bedtime     Diet    Follow this diet upon discharge: Orders Placed This Encounter      Moderate Consistent Carb (60 g CHO per Meal) Diet       Examination   Physical Exam   Temp:  [97.7  F (36.5  C)-98.5  F (36.9  C)] 98  F (36.7  C)  Pulse:  [70-77] 70  Resp:  [17] 17  BP: ()/(55-72) 148/71  SpO2:  [92 %-98 %] 98 %  Wt Readings from Last 4 Encounters:   05/28/22 85.8 kg (189 lb 1.6 oz)   05/24/22 89.8 kg (198 lb)   04/22/22 89.8 kg (198 lb)   04/15/22 89.9 kg (198 lb 4.8 oz)       Constitutional: awake, alert, cooperative, no apparent distress, and appears stated age and moderately obese  Eyes: Lids and lashes normal, pupils equal, round and reactive to light, extra ocular muscles intact, sclera clear, conjunctiva normal  ENT: Normocephalic, without obvious abnormality, atraumatic, sinuses nontender on palpation, external ears without lesions, oral pharynx with moist mucous membranes, tonsils without erythema or exudates, gums normal and good  dentition.  Respiratory: No increased work of breathing, good air exchange, clear to auscultation bilaterally, no crackles or wheezing  Cardiovascular: Normal apical impulse, regular rate and rhythm, normal S1 and S2, no S3 or S4, and no murmur noted  GI: No scars, normal bowel sounds, soft, non-distended, non-tender, no masses palpated, no hepatosplenomegally  Skin: normal skin color, texture, turgor, no redness, warmth, or swelling, and no rashes  Musculoskeletal: There is no redness, warmth, or swelling of the joints.  Full range of motion noted.  Motor strength: Tone is normal. no lower extremity pitting edema present  Neurologic: Cranial nerves II-XII are grossly intact. Sensory: Decreased sensation lower extremities secondary to neuropathy  Neuropsychiatric: General: normal, calm and normal eye contact Level of consciousness: alert / normal Affect: anxious and tearful Orientation: oriented to self, place, time and situation Memory and insight: normal, memory for past and recent events intact and thought process normal      Please see EMR for more detailed significant labs, imaging, consultant notes etc.    IFelipe MD, personally saw the patient today and spent greater than 30 minutes discharging this patient.    Felipe Philippe MD  [facility]    CC:Ananya Mclean

## 2022-05-31 NOTE — PLAN OF CARE
Patient alert & oriented. [place]. Patient reports pain to BLE and back. PRN Percocet and Robaxin administered. Blood sugar levels WNL. Plan to discharge today @ 10 AM, son will be transport. Bed alarms on for safety. Up with assist of 1-2 to commode. Pt is weak and unsteady. She states that her legs shake when trying to stand and lift them. Calamine lotion applied to arm rash and miconazole powder applied to pannus rash. Calls appropriately.

## 2022-05-31 NOTE — CONSULTS
[facility] ACUTE PAIN SERVICE CONSULTATION ([facility], [facility], [facility])     Date of Admission:  5/26/2022  Date of Consult (When I saw the patient): 05/31/22  Physician requesting consult: Dr. Sutton   Reason for consult: Acute on chronic pain left leg pain, sedation     Assessment/Plan:     Tonya Yang is a 81 year old female who was admitted on 5/26/2022.   . I was asked to see the patient for Acute on chronic pain left leg pain, sedation. Admitted for cold lower extremities, ABIs normal, vascular signed off. History of obesity, anxiety, arthritis, asthma, HTN, HLD, Bipolar . Pain began years ago. Neurosurgery addressed Palliative Care back on 3/8/2022. Pt is not a spine surgery candidate (see notes from 4/9/22 Ruth Vallejo). Will follow up with [facility] on 6/15.    PLAN:   1) Chronic widespread pain.  Pain is likely multifactorial she does have known cervical and lumbar stenosis as well as significant lower extremity neuropathy and lymphedema.  2)Multimodal Medication Therapy  Topical:  Diclofenac ointment  Adjuvants: Robaxin, Lyrica 25mg in am and 50mg in Pm (renally dosed), atarax, cymbalta & lamictal   Opioids:  Oxycodone/acetaminophen  IV Pain medication: none indicated   3)Non-medication interventions- Ice, Heat, physical therapy  4)Constipation Prophylaxis- senna and miralax     5) Follow up - follow up with [facility] on 6/15 and follow up with Ananya Mclean.          History of Present Illness (HPI):       Tonya Yang is a 81 year old old female who presented with cold foot, LE pain, and did capillary refill about 3 seconds.  ABIs were normal on the left and mildly low on the right.  Vascular surgery was consulted.  They felt it was not a vascular issue.  Pain medications were titrated and the Lyrica was increased while here.  Apparently she was noncompliant with Lasix at home..   Tonya is frustrated with the persistent pain.  She states she did meet  with neurology to investigate intrathecal pain pump.  She was displeased with the visit there and will not be returning to neuropathy clinic.  She does have another [facility].  Called [facility].  Apparently this will be on 15 Maira.  They have not yet received her MRIs.  Tonya is frustrated that she does not feel well enough to attend her granddaughter's wedding.  She states that she spends most of her time at home and does have a care attendant for 6 hours/day.  Her care attendant was absent 3 days over the last 4 weeks.  It was very difficult for her to be alone on those 3 days.      [place] -pulled from system on 05/31/22. Last refill on  4/18- 120 tabs of percocet from [facility] Mclean      Medical History   has a past medical history of Abdominal pain, Anxiety, Arthritis, Asthma, Bipolar 1 disorder (H), Chronic pain, Cochlear hydrops (1988), COPD (chronic obstructive pulmonary disease) (H), Depression, Diabetes mellitus (H), Dyspepsia, GERD (gastroesophageal reflux disease), Hard of hearing, Hepatic abscess, Hyperlipidemia, Hypertension, Irritable bowel syndrome, Meniere disease, Neuropathy, Noninfectious ileitis, Peritoneal abscess (H), Spinal stenosis of lumbar region, Steroid long-term use, Vaginitis, atrophic, postmenopausal, and Vitamin D deficiency.    She has no past medical history of Blood clotting disorder (H).       Surgical History   has a past surgical history that includes Rectal surgery; Foot surgery; picc insertion (8/28/2013); GI surgery; orthopedic surgery; vascular surgery; Laparoscopic hepatectomy partial (11/4/2013); cataract iol, rt/lt (Left, 7/2013); and IR Lumbar Sacral Transfor Injection Bilateral (Bilateral, 3/11/2022).     Allergies     Allergies   Allergen Reactions     Propofol Nausea and Vomiting     Shellfish Allergy      Other reaction(s): Dizziness     Capsaicin Rash and Blisters        Current Home Medications   Prior to Admission medications     Medication Sig Start Date End Date Taking? Authorizing Provider   atorvastatin (LIPITOR) 10 MG tablet Take 10 mg by mouth At Bedtime   Yes Unknown, Entered By History   cyanocobalamin 1000 MCG SUBL Place 1,000 mcg under the tongue daily   Yes Unknown, Entered By History   DIAZEPAM 5 MG/5ML solution Take 0.5 mg by mouth 2 times daily as needed for anxiety 5/26/22  Yes Unknown, Entered By History   diclofenac (VOLTAREN) 1 % topical gel Apply 4 g topically 4 times daily  Patient taking differently: Apply 4 g topically 4 times daily as needed 4/15/22  Yes Rebecca Sutton MD   DULoxetine (CYMBALTA) 60 MG EC capsule TAKE 1 CAPSULE (60 MG) BY MOUTH DAILY 11/17/17  Yes Bethanie Finnegan MD   ferrous sulfate (FEROSUL) 325 (65 Fe) MG tablet Take 1 tablet (325 mg) by mouth daily 4/15/22  Yes Ml López MD   furosemide (LASIX) 20 MG tablet Take 1 tablet (20 mg) by mouth every evening 2 pm  Patient taking differently: Take 20 mg by mouth daily as needed 4/15/22  Yes Rebecca Sutton MD   gabapentin (NEURONTIN) 100 MG capsule Take 200 mg by mouth 2 times daily   Yes Unknown, Entered By History   glipiZIDE (GLUCOTROL) 10 MG tablet Take 1 tablet (10 mg) by mouth 2 times daily (before meals) 3/12/22  Yes Hayder Mishra MD   hydrOXYzine (ATARAX) 25 MG tablet Take 25 mg by mouth 3 times daily as needed for anxiety   Yes Unknown, Entered By History   JARDIANCE 25 MG TABS tablet Take 25 mg by mouth daily  7/6/21  Yes Reported, Patient   lamoTRIgine (LAMICTAL) 100 MG tablet Take 100 mg by mouth 2 times daily   Yes Unknown, Entered By History   melatonin 1 MG TABS tablet Take 1 tablet (1 mg) by mouth nightly as needed for sleep 4/12/22  Yes Ml López MD   metFORMIN (GLUCOPHAGE) 500 MG tablet Take 750 mg by mouth daily (with dinner)   Yes Unknown, Entered By History   methocarbamol (ROBAXIN) 500 MG tablet Take 1 tablet (500 mg) by mouth every 12 hours as needed for muscle spasms 4/15/22  Yes Rebecca Sutton MD    miconazole (MICATIN) 2 % external powder Apply topically 2 times daily as needed To the skin fold 4/12/22  Yes Ml López MD   mineral oil-hydrophilic petrolatum (AQUAPHOR) external ointment Apply topically 3 times daily as needed To the dry skin 4/12/22  Yes Ml López MD   omeprazole (PRILOSEC) 20 MG DR capsule Take 20 mg by mouth daily    Yes Unknown, Entered By History   oxybutynin ER (DITROPAN XL) 15 MG 24 hr tablet Take 15 mg by mouth daily   Yes Unknown, Entered By History   oxyCODONE-acetaminophen (PERCOCET) 5-325 MG tablet Take 1 tablet by mouth every 6 hours as needed for severe pain (Max 4 tablets daily)   Yes Unknown, Entered By History   polyethylene glycol (MIRALAX) 17 GM/Dose powder Take 17 g by mouth daily  Patient taking differently: Take 17 g by mouth daily as needed for constipation 4/15/22  Yes Rebecca Sutton MD   predniSONE (DELTASONE) 1 MG tablet Take 6 mg by mouth daily    Yes Unknown, Entered By History   pregabalin (LYRICA) 25 MG capsule Take 1 capsule (25 mg) by mouth At Bedtime 4/15/22  Yes Rebecca Sutton MD   senna-docusate (SENOKOT-S/PERICOLACE) 8.6-50 MG tablet Take 1 tablet by mouth 2 times daily  Patient taking differently: Take 1 tablet by mouth 2 times daily as needed 4/15/22  Yes Rebecca Sutton MD   triamcinolone (KENALOG) 0.1 % external cream Apply topically 2 times daily as needed    Yes Reported, Patient   vitamin D3 (CHOLECALCIFEROL) 50 mcg (2000 units) tablet Take 3 tablets by mouth daily   Yes Unknown, Entered By History   acetaminophen (TYLENOL) 325 MG tablet Take 1-2 tablets (325-650 mg) by mouth every 6 hours as needed for mild pain 3/12/22   Venita Cano APRN CNP   blood glucose (ACCU-CHEK FASTCLIX) lancing device FOR TESTING ONCE DAILY. DX  E11.9 TYPE 2 DIABETES 9/2/20   Reported, Patient   blood glucose (CONTOUR TEST) test strip TESTING EVERY DAY DX  E11.9 11/23/20   Reported, Patient   CONTOUR NEXT TEST test strip TESTING EVERY DAY DX  E11.9  6/24/21   Reported, Patient   order for DME Equipment being ordered: wrist brace 5/31/20   Leah Feliz PA-C   amLODIPine (NORVASC) 10 MG tablet Take 1 tablet (10 mg) by mouth daily 3/13/22 4/14/22  Hayder Mishra MD          Social History  Reviewed, and she  reports that she quit smoking about 34 years ago. Her smoking use included cigarettes. She has quit using smokeless tobacco. She reports previous alcohol use. She reports that she does not use drugs.      Family History- Reviewed care everywhere to find family history- Nothing relevant to pain consult    Reviewed, and family history includes Cerebrovascular Disease in her mother; Heart Disease in her brother, father, and mother.    Review of Systems  Complete ROS reviewed, unless noted  , all other systems reviewed (with patient) and all others found to be negative.         Objective:     Vitals:  B/P: 148/71, T: 98, P: 70, R: 17     Weight:   189 lbs 1.6 oz  Body mass index is 31.47 kg/m .      Physical Exam:     General Appearance:  Alert, cooperative, no distress, appears stated age  Hard of hearing    Head:  Normocephalic, without obvious abnormality, atraumatic   Eyes:  Pupils are reactive    ENT/Throat: Lips normal    Lymph/Neck: Supple, symmetrical, trachea midline, no adenopathy, thyroid: not enlarged, symmetric    Lungs:   Clear to auscultation bilaterally, respirations unlabored   Chest Wall:  No tenderness or deformity   Cardiovascular/Heart:  Regular rate and rhythm, S1, S2 normal,no murmur, rub or gallop.     Abdomen:   Soft, non-tender, bowel sounds active all four quadrants,  no masses, no organomegaly   Musculoskeletal: Extremities with slight edema    Skin: Skin color pale, lesions not noted    Neurologic: Alert and oriented X 3, Moves all 4 extremities            Labs Reviewed Personally By Myself    Sodium   Date Value Ref Range Status   05/30/2022 145 136 - 145 mmol/L Final   10/24/2017 139 133 - 144 mmol/L Final      Potassium   Date Value Ref Range Status   05/30/2022 4.1 3.5 - 5.0 mmol/L Final   10/24/2017 3.9 3.4 - 5.3 mmol/L Final     Chloride   Date Value Ref Range Status   05/30/2022 100 98 - 107 mmol/L Final   10/24/2017 101 94 - 109 mmol/L Final     Carbon Dioxide   Date Value Ref Range Status   10/24/2017 28 20 - 32 mmol/L Final     Carbon Dioxide (CO2)   Date Value Ref Range Status   05/30/2022 32 (H) 22 - 31 mmol/L Final     Anion Gap   Date Value Ref Range Status   05/30/2022 13 5 - 18 mmol/L Final   10/24/2017 10 3 - 14 mmol/L Final     Glucose   Date Value Ref Range Status   05/30/2022 105 70 - 125 mg/dL Final   10/24/2017 116 (H) 70 - 99 mg/dL Final     GLUCOSE BY METER POCT   Date Value Ref Range Status   05/31/2022 109 (H) 70 - 99 mg/dL Final     Urea Nitrogen   Date Value Ref Range Status   05/30/2022 22 8 - 28 mg/dL Final   10/24/2017 18 7 - 30 mg/dL Final     Creatinine   Date Value Ref Range Status   05/30/2022 0.79 0.60 - 1.10 mg/dL Final   10/24/2017 0.84 0.52 - 1.04 mg/dL Final     GFR Estimate   Date Value Ref Range Status   05/30/2022 76 >60 mL/min/1.73m2 Final     Comment:     Effective December 21, 2021 eGFRcr in adults is calculated using the 2021 CKD-EPI creatinine equation which includes age and gender (Teena et al., NEJ, DOI: 10.1056/WPDHsz6646359)   12/29/2020 >60 >60 mL/min/1.73m2 Final   10/24/2017 66 >60 mL/min/1.7m2 Final     Comment:     Non  GFR Calc     Calcium   Date Value Ref Range Status   05/30/2022 9.7 8.5 - 10.5 mg/dL Final   10/24/2017 9.5 8.5 - 10.1 mg/dL Final             Total time spent 67 minutes with greater than 50% in consultation, education and coordination of care.     Also discussed with RN and discussed follow up plan with Dr. Philippe.     Thank you for this consultation.          Jo ROMANO, CNS-BC, CNP, ACHPN  Acute Care Pain Management Program Hour 7a-1700  [facility] ([place], [place], [place])   Page via [employer]- Click HERE to page  Jo or tianna [phone]

## 2022-05-31 NOTE — PLAN OF CARE
Occupational Therapy Discharge Summary    Reason for therapy discharge:    Discharged to home with home therapy.    Progress towards therapy goal(s). See goals on Care Plan in [facility] electronic health record for goal details.  Goals not met.  Barriers to achieving goals:   discharge from facility.    Therapy recommendation(s):    Continued therapy is recommended.  Rationale/Recommendations:  pt not at baseline for ADLs. rec TCU but declined.

## 2022-05-31 NOTE — PROGRESS NOTES
Care Management Discharge Note    Discharge Date: 05/31/2022       Discharge Disposition: Home Care    Discharge Services: [facility] ([facility])    Discharge DME: None    Discharge Transportation: family or friend will provide    Education Provided on the Discharge Plan:  AVS per bedside RN.    Persons Notified of Discharge Plans: patient and son    Patient/Family in Agreement with the Plan: yes        Additional Information:  Chart reviewed. CM received a call that son is here and wants to resume [facility] ([facility]). MD placed orders. Palliative care also met with the patient this AM. CM was updated by Palliative Care that their team will reach out to the patient/family. CM gave the patient a phone number to reach CM if they don't hear from Palliative Care. Family will transport home at hospital discharge. Patient was discharged by bedside RN.    Son requesting resumption of home care: [facility] ([facility])    PT recommendations: Transitional Care Facility  OT recommendations: Transitional Care Facility    Daniella Mcallister RN

## 2022-05-31 NOTE — DISCHARGE INSTRUCTIONS
Palliative Care Providers will be calling you to arrange follow up.  If you have further questions you can call Daniella VILLARREAL Care Manager at [phone] for assistance.

## 2022-06-01 ENCOUNTER — PATIENT OUTREACH (OUTPATIENT)
Dept: CARE COORDINATION | Facility: CLINIC | Age: 82
End: 2022-06-01
Payer: MEDICARE

## 2022-06-01 DIAGNOSIS — Z71.89 OTHER SPECIFIED COUNSELING: ICD-10-CM

## 2022-06-01 NOTE — PROGRESS NOTES
Clinic Care Coordination Contact  [facility]/Voicemail       Clinical Data: Care Coordinator Outreach  Outreach attempted x 1.  Unable to leave a message at this time no answer machine just disconnected. Care Coordinator will try to reach patient again in 1-2 business days.      Suzie Bourne  [phone]  Care

## 2022-06-02 NOTE — PROGRESS NOTES
Clinic Care Coordination Contact  [facility]/Voicemail       Clinical Data: Care Coordinator Outreach  Outreach attempted x 2.  Unable to leave a message no answer machine . Care Coordinator will do no further outreaches at this time.      Suzie Bourne  [phone]  Care

## 2022-06-16 ENCOUNTER — APPOINTMENT (OUTPATIENT)
Dept: CT IMAGING | Facility: CLINIC | Age: 82
End: 2022-06-16
Attending: EMERGENCY MEDICINE
Payer: MEDICARE

## 2022-06-16 ENCOUNTER — APPOINTMENT (OUTPATIENT)
Dept: RADIOLOGY | Facility: CLINIC | Age: 82
End: 2022-06-16
Attending: EMERGENCY MEDICINE
Payer: MEDICARE

## 2022-06-16 ENCOUNTER — HOSPITAL ENCOUNTER (EMERGENCY)
Facility: CLINIC | Age: 82
Discharge: HOME | End: 2022-06-17
Attending: EMERGENCY MEDICINE | Admitting: EMERGENCY MEDICINE
Payer: MEDICARE
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
## 2022-06-16 DIAGNOSIS — R14.0 ABDOMINAL DISTENSION: ICD-10-CM

## 2022-06-16 DIAGNOSIS — S49.92XA SHOULDER INJURY, LEFT, INITIAL ENCOUNTER: ICD-10-CM

## 2022-06-16 LAB
ALBUMIN SERPL-MCNC: 3.6 G/DL (ref 3.5–5)
ALP SERPL-CCNC: 76 U/L (ref 45–120)
ALT SERPL W P-5'-P-CCNC: 20 U/L (ref 0–45)
ANION GAP SERPL CALCULATED.3IONS-SCNC: 14 MMOL/L (ref 5–18)
AST SERPL W P-5'-P-CCNC: 21 U/L (ref 0–40)
BASOPHILS # BLD AUTO: 0 10E3/UL (ref 0–0.2)
BASOPHILS NFR BLD AUTO: 0 %
BILIRUB SERPL-MCNC: 0.4 MG/DL (ref 0–1)
BUN SERPL-MCNC: 20 MG/DL (ref 8–28)
CALCIUM SERPL-MCNC: 10 MG/DL (ref 8.5–10.5)
CHLORIDE BLD-SCNC: 103 MMOL/L (ref 98–107)
CO2 SERPL-SCNC: 27 MMOL/L (ref 22–31)
CREAT SERPL-MCNC: 0.9 MG/DL (ref 0.6–1.1)
EOSINOPHIL # BLD AUTO: 0.2 10E3/UL (ref 0–0.7)
EOSINOPHIL NFR BLD AUTO: 2 %
ERYTHROCYTE [DISTWIDTH] IN BLOOD BY AUTOMATED COUNT: 16.6 % (ref 10–15)
GFR SERPL CREATININE-BSD FRML MDRD: 65 ML/MIN/1.73M2
GLUCOSE BLD-MCNC: 135 MG/DL (ref 70–125)
HCT VFR BLD AUTO: 36.9 % (ref 35–47)
HGB BLD-MCNC: 10.2 G/DL (ref 11.7–15.7)
IMM GRANULOCYTES # BLD: 0 10E3/UL
IMM GRANULOCYTES NFR BLD: 1 %
INR PPP: 1.02 (ref 0.85–1.15)
LIPASE SERPL-CCNC: 23 U/L (ref 0–52)
LYMPHOCYTES # BLD AUTO: 2.2 10E3/UL (ref 0.8–5.3)
LYMPHOCYTES NFR BLD AUTO: 25 %
MCH RBC QN AUTO: 23.2 PG (ref 26.5–33)
MCHC RBC AUTO-ENTMCNC: 27.6 G/DL (ref 31.5–36.5)
MCV RBC AUTO: 84 FL (ref 78–100)
MONOCYTES # BLD AUTO: 0.7 10E3/UL (ref 0–1.3)
MONOCYTES NFR BLD AUTO: 8 %
NEUTROPHILS # BLD AUTO: 5.7 10E3/UL (ref 1.6–8.3)
NEUTROPHILS NFR BLD AUTO: 64 %
NRBC # BLD AUTO: 0 10E3/UL
NRBC BLD AUTO-RTO: 0 /100
PLATELET # BLD AUTO: 326 10E3/UL (ref 150–450)
POTASSIUM BLD-SCNC: 3.9 MMOL/L (ref 3.5–5)
PROT SERPL-MCNC: 6.4 G/DL (ref 6–8)
RBC # BLD AUTO: 4.4 10E6/UL (ref 3.8–5.2)
SODIUM SERPL-SCNC: 144 MMOL/L (ref 136–145)
WBC # BLD AUTO: 8.8 10E3/UL (ref 4–11)

## 2022-06-16 PROCEDURE — 83690 ASSAY OF LIPASE: CPT | Performed by: EMERGENCY MEDICINE

## 2022-06-16 PROCEDURE — 74177 CT ABD & PELVIS W/CONTRAST: CPT

## 2022-06-16 PROCEDURE — 96374 THER/PROPH/DIAG INJ IV PUSH: CPT | Mod: 59

## 2022-06-16 PROCEDURE — 80053 COMPREHEN METABOLIC PANEL: CPT | Performed by: EMERGENCY MEDICINE

## 2022-06-16 PROCEDURE — 99285 EMERGENCY DEPT VISIT HI MDM: CPT | Mod: 25

## 2022-06-16 PROCEDURE — 85025 COMPLETE CBC W/AUTO DIFF WBC: CPT | Performed by: EMERGENCY MEDICINE

## 2022-06-16 PROCEDURE — 73030 X-RAY EXAM OF SHOULDER: CPT | Mod: LT

## 2022-06-16 PROCEDURE — 36415 COLL VENOUS BLD VENIPUNCTURE: CPT | Performed by: EMERGENCY MEDICINE

## 2022-06-16 PROCEDURE — 96375 TX/PRO/DX INJ NEW DRUG ADDON: CPT

## 2022-06-16 PROCEDURE — 85610 PROTHROMBIN TIME: CPT | Performed by: EMERGENCY MEDICINE

## 2022-06-16 PROCEDURE — 250N000011 HC RX IP 250 OP 636: Performed by: EMERGENCY MEDICINE

## 2022-06-16 RX ORDER — IOPAMIDOL 755 MG/ML
100 INJECTION, SOLUTION INTRAVASCULAR ONCE
Status: COMPLETED | OUTPATIENT
Start: 2022-06-17 | End: 2022-06-17

## 2022-06-16 RX ORDER — FENTANYL CITRATE 50 UG/ML
25 INJECTION, SOLUTION INTRAMUSCULAR; INTRAVENOUS ONCE
Status: COMPLETED | OUTPATIENT
Start: 2022-06-16 | End: 2022-06-16

## 2022-06-16 RX ADMIN — FENTANYL CITRATE 25 MCG: 50 INJECTION, SOLUTION INTRAMUSCULAR; INTRAVENOUS at 22:47

## 2022-06-17 VITALS
DIASTOLIC BLOOD PRESSURE: 76 MMHG | HEART RATE: 87 BPM | SYSTOLIC BLOOD PRESSURE: 161 MMHG | TEMPERATURE: 97.9 F | HEIGHT: 65 IN | RESPIRATION RATE: 20 BRPM | OXYGEN SATURATION: 93 %

## 2022-06-17 PROCEDURE — 250N000011 HC RX IP 250 OP 636: Performed by: EMERGENCY MEDICINE

## 2022-06-17 PROCEDURE — 250N000013 HC RX MED GY IP 250 OP 250 PS 637: Performed by: EMERGENCY MEDICINE

## 2022-06-17 RX ORDER — FUROSEMIDE 10 MG/ML
40 INJECTION INTRAMUSCULAR; INTRAVENOUS ONCE
Status: COMPLETED | OUTPATIENT
Start: 2022-06-17 | End: 2022-06-17

## 2022-06-17 RX ORDER — OXYCODONE AND ACETAMINOPHEN 5; 325 MG/1; MG/1
1 TABLET ORAL ONCE
Status: COMPLETED | OUTPATIENT
Start: 2022-06-17 | End: 2022-06-17

## 2022-06-17 RX ADMIN — FUROSEMIDE 40 MG: 10 INJECTION, SOLUTION INTRAVENOUS at 01:14

## 2022-06-17 RX ADMIN — IOPAMIDOL 100 ML: 755 INJECTION, SOLUTION INTRAVENOUS at 00:01

## 2022-06-17 RX ADMIN — OXYCODONE AND ACETAMINOPHEN 1 TABLET: 5; 325 TABLET ORAL at 01:12

## 2022-06-17 ASSESSMENT — ENCOUNTER SYMPTOMS
NECK PAIN: 1
ABDOMINAL PAIN: 1
VOMITING: 0
SHORTNESS OF BREATH: 0
ARTHRALGIAS: 1
BACK PAIN: 1
CONSTIPATION: 1
ABDOMINAL DISTENTION: 1
NUMBNESS: 1

## 2022-06-17 NOTE — DISCHARGE INSTRUCTIONS
Likely, no broken bones or significant findings in your belly.  I understand it is hard to get to and from the bathroom, but I believe your Lasix will greatly improve your leg swelling and even some of your abdominal distention  Continue home medications, as well as bowel regimen to help with constipation  Follow-up closely with her prime provider or return if you are unsafe at home.

## 2022-06-17 NOTE — ED PROVIDER NOTES
EMERGENCY DEPARTMENT ENCOUNTER      NAME: Jumana Yang  AGE: 81 year old female  YOB: 1940  MRN: [id number]  EVALUATION DATE & TIME: 6/16/2022 10:25 PM    PCP: No primary care provider on file.    ED PROVIDER: Terrie France DO      Chief Complaint   Patient presents with     Fall         FINAL IMPRESSION:  No diagnosis found.      ED COURSE & MEDICAL DECISION MAKING:    Pertinent Labs & Imaging studies reviewed. (See chart for details)  10:30 PM I met the patient and performed my initial interview and exam.  12:39 AM I rechecked and updated the patient/son on reassuring results. Patient reports she has not been taking Lasix x1 week because she is worried about urinating and getting up to use the bathroom without assistance. Discussed patient's safely at home, and whether it is time for placement. Son does not think it is time yet. Discussed resuming Lasix will help with abdominal distension and leg swelling. He and patient are requesting IV Lasix here, diuresis, then discharge. I think this is reasonable. Someone will be at home to care for patient tomorrow morning.       81 year old female presents to the Emergency Department for evaluation of left shoulder pain after a fall.  Patient with a history of multiple comorbidities including spinal stenosis, chronic pain, neuropathy, anxiety, diabetes, claudication.  She is pretty much wheelchair-bound at this time but is able to self transfer.  Son is at bedside.  They have PCAs coming 4 times a week and he cares for the other 3.  She is on chronic opioids.  She has been constipated, however started having bowel movements when using MiraLAX, fleets enema and docusate.  Tonight she was going to have a bowel movement, she fell in between the toilet in the shower.  Son was with her and witnessed.  No syncope.  No hit her head.  She notes pain to her left shoulder.  Patient does have a distended abdomen.  She reports this happened pretty abruptly.  She is  passing gas.  No vomiting.  No fever.  No other signs of trauma exam.  X-ray of the shoulder without evidence of fracture or dislocation.  Labs are fairly unremarkable.  CT of the abdomen obtained.  No signs of ascites, bowel obstruction, fecal impaction, or other acute abnormality.  Patient very concerned about the swelling in her legs.  She has not taken her Lasix for at least a week.  Per nursing reports she does not take it because she has too much difficulty getting up to the bathroom on her own.  I did have a long discussion with the son at bedside.  He reports he and his wife have been discussing when to be time to go to a transitional care unit.  Patient has had several recent admissions.  Reports she had been doing well, they have plans on physical therapy and coming into the home.  He does not think that right now is the time.  I did discuss that Lasix could help with her edema, I suspect some of her abdominal distention is some edema in the wall.  She is not short of breath or tachypneic.  I did discuss giving an IV dose of Lasix.  Son does ask if there would be any way that patient could diurese here to help prevent any falls and they will have someone at home in the morning to make sure to care for her.  I feel is reasonable.  Patient given IV Lasix here.  Plan to discharge back to home.  She does have a pain plan and has opiates at home as needed for her shoulder pain.  We went over discharge recommendations and recommendation to restart her Lasix.  Encourage close follow-up with her prime provider, especially if they feel she is failing at home and needs TCU placement.    At the conclusion of the encounter I discussed the results of all of the tests and the disposition. The questions were answered. The patient or family acknowledged understanding and was agreeable with the care plan.     MEDICATIONS GIVEN IN THE EMERGENCY:  Medications   furosemide (LASIX) injection 40 mg (has no administration in  time range)   oxyCODONE-acetaminophen (PERCOCET) 5-325 MG per tablet 1 tablet (has no administration in time range)   fentaNYL (PF) (SUBLIMAZE) injection 25 mcg (25 mcg Intravenous Given 6/16/22 2247)   iopamidol (ISOVUE-370) solution 100 mL (100 mLs Intravenous Given 6/17/22 0001)       NEW PRESCRIPTIONS STARTED AT TODAY'S ER VISIT  New Prescriptions    No medications on file          =================================================================    HPI    Patient information was obtained from: patient, patient's son    Use of : N/A         Jumana Yang is a 81 year old female with a pertinent history of   Meniere's disease, DM type II, CKD stage III, HTN, HLD,  IBS,  liver abscess s/p partial hepatectomy, COPD, GERD,  chronic lower extermity edema, severe spinal stenosis s/p steriod spinal injections, lower extemity neuropathy, chronic lower extremity pain, chronic pain syndrome, arthritis, bipolar I disorder, depression, anxiety, who presents to this ED by EMS for evaluation of fall, abdominal distension.    Per chart review, patient was admitted to [facility] 5/26/2022-5/31/2022. Initially presented to ED with complaints of acute on chronic lower extremity edema, bilateral leg pain, and cold foot temperature. CTA chest abdomen pelvis in the ED was concerning for left peroneal artery occlusion, felt movement related. ABIs were 0.89 on the right, greater than 1 on the left. Vascular surgery saw her and did not recommend further work-up at this time. Lasix dose increased for lower extremity edema. Underlying severe spinal stenosis contributing to her recent decline.  Pain team adjusted her pain medications. Consulted neurosurgery who deemed patient not a surgical candidate (consistent with past orthospine rec). Patient declined TCU admission against PT/OT recommendation.  Family agreeable to discharge home with home care services.  Started on potassium for some hypokalemia. Medically stable  otherwise.    Patient reports she has been suffering from progressively worsening right sided neuropathy (leg/lateral torso) since March '22. Prior, she was solely dealing with chronic severe back/neck pain. She recently started using a wheelchair due to worsening mobility issues. Today while transferring from the toilet to her wheelchair, she slipped to the ground because she did not have sensation in her right foot. Patient's son witnessed the fall. Denies hitting head. Since the fall, she endorses left shoulder pain. She has had 4-5 falls prior to today. She had a spine appointment yesterday. Not anticoagulated.      Additionally, patient has been experiencing severe constipation x3-4 weeks. She has been taking various medications and now starting to have bowel movements. For the past 2-3 days, she has been complaining to son of abdominal firmness and bloating. Son arrived today at 1pm, and did not notice significant distension, however, it rapidly progressed throughout the remainder of the day. Patient endorses associated abdominal pain. She also has chronic bilateral lower leg swelling. She has PCAs come to her come 4x/week, and son comes the remainder of days. Denies shortness of breath, vomiting.     REVIEW OF SYSTEMS   Review of Systems   Respiratory: Negative for shortness of breath.    Cardiovascular: Positive for leg swelling (bilateral, chronic).   Gastrointestinal: Positive for abdominal distention, abdominal pain and constipation. Negative for vomiting.   Musculoskeletal: Positive for arthralgias (left shoulder), back pain (chronic) and neck pain (chronic).   Neurological: Positive for numbness (right leg/foot/torso).   All other systems reviewed and are negative.      PAST MEDICAL HISTORY:  History reviewed. No pertinent past medical history.    PAST SURGICAL HISTORY:  History reviewed. No pertinent surgical history.        CURRENT MEDICATIONS:    No current outpatient medications on file.    "    ALLERGIES:  No Known Allergies    FAMILY HISTORY:  History reviewed. No pertinent family history.    SOCIAL HISTORY:        VITALS:  BP (!) 159/71   Pulse 82   Temp 97.9  F (36.6  C) (Oral)   Resp 20   Ht 1.651 m (5' 5\")   SpO2 95%     PHYSICAL EXAM    Physical Exam  Constitutional:       General: She is not in acute distress.  HENT:      Head: Normocephalic and atraumatic.      Mouth/Throat:      Pharynx: Oropharynx is clear.   Eyes:      Pupils: Pupils are equal, round, and reactive to light.   Cardiovascular:      Rate and Rhythm: Normal rate and regular rhythm.      Pulses: Normal pulses.           Radial pulses are 2+ on the right side and 2+ on the left side.      Heart sounds: Normal heart sounds.   Pulmonary:      Effort: Pulmonary effort is normal.      Breath sounds: Normal breath sounds.   Abdominal:      General: Bowel sounds are decreased. There is distension.      Palpations: Abdomen is soft.      Tenderness: There is no abdominal tenderness.   Musculoskeletal:      Left shoulder: Tenderness present. No laceration. Decreased range of motion.      Right lower leg: Edema present.      Left lower leg: Edema present.   Skin:     General: Skin is warm and dry.      Capillary Refill: Capillary refill takes less than 2 seconds.   Neurological:      General: No focal deficit present.      Mental Status: She is alert and oriented to person, place, and time.      Comments: Diminished sensation to right leg and right lateral side of torso.           LAB:  All pertinent labs reviewed and interpreted.  Labs Ordered and Resulted from Time of ED Arrival to Time of ED Departure   COMPREHENSIVE METABOLIC PANEL - Abnormal       Result Value    Sodium 144      Potassium 3.9      Chloride 103      Carbon Dioxide (CO2) 27      Anion Gap 14      Urea Nitrogen 20      Creatinine 0.90      Calcium 10.0      Glucose 135 (*)     Alkaline Phosphatase 76      AST 21      ALT 20      Protein Total 6.4      Albumin 3.6   "    Bilirubin Total 0.4      GFR Estimate 65     CBC WITH PLATELETS AND DIFFERENTIAL - Abnormal    WBC Count 8.8      RBC Count 4.40      Hemoglobin 10.2 (*)     Hematocrit 36.9      MCV 84      MCH 23.2 (*)     MCHC 27.6 (*)     RDW 16.6 (*)     Platelet Count 326      % Neutrophils 64      % Lymphocytes 25      % Monocytes 8      % Eosinophils 2      % Basophils 0      % Immature Granulocytes 1      NRBCs per 100 WBC 0      Absolute Neutrophils 5.7      Absolute Lymphocytes 2.2      Absolute Monocytes 0.7      Absolute Eosinophils 0.2      Absolute Basophils 0.0      Absolute Immature Granulocytes 0.0      Absolute NRBCs 0.0     INR - Normal    INR 1.02     LIPASE - Normal    Lipase 23         RADIOLOGY:  Reviewed all pertinent imaging. Please see official radiology report.  XR Shoulder Left 2 Views   Final Result   IMPRESSION: No visible fracture or dislocation.       CT Abdomen Pelvis w Contrast   Final Result   IMPRESSION:    1.  No acute process in the abdomen or pelvis.   2.  Colonic diverticulosis without diverticulitis.             I, Suni Gray, am serving as a scribe to document services personally performed by Dr. Terrie France based on my observation and the provider's statements to me. ITerrie DO attest that Suni Gray is acting in a scribe capacity, has observed my performance of the services and has documented them in accordance with my direction.    Terrie France DO  Emergency Medicine  [facility] EMERGENCY ROOM  [address] [id number] [phone]  Dept: [phone]       Terrie France DO  06/17/22 0201

## 2022-06-17 NOTE — ED TRIAGE NOTES
Patient came via [facility] after experiencing a fall in the bathroom. Patient fell onto her left shoulder. Patient has been having bowel movement issues and today reports of small ball formed stools with abdomen bloating. No thinners and no decreased level of consciousness.

## 2022-06-21 ENCOUNTER — VIRTUAL VISIT (OUTPATIENT)
Dept: PALLIATIVE MEDICINE | Facility: CLINIC | Age: 82
End: 2022-06-21
Attending: PAIN MEDICINE
Payer: MEDICARE

## 2022-06-21 DIAGNOSIS — G89.4 CHRONIC PAIN SYNDROME: ICD-10-CM

## 2022-06-21 NOTE — PROGRESS NOTES
No Show.     I would not reschedule this appointment as recommendation would be for pain management center to address back pain.

## 2022-07-03 ENCOUNTER — APPOINTMENT (OUTPATIENT)
Dept: CT IMAGING | Facility: CLINIC | Age: 82
End: 2022-07-03
Attending: EMERGENCY MEDICINE
Payer: MEDICARE

## 2022-07-03 ENCOUNTER — NURSE TRIAGE (OUTPATIENT)
Dept: NURSING | Facility: CLINIC | Age: 82
End: 2022-07-03

## 2022-07-03 ENCOUNTER — HOSPITAL ENCOUNTER (EMERGENCY)
Facility: CLINIC | Age: 82
Discharge: HOME | End: 2022-07-04
Attending: EMERGENCY MEDICINE | Admitting: EMERGENCY MEDICINE
Payer: MEDICARE

## 2022-07-03 VITALS
RESPIRATION RATE: 20 BRPM | SYSTOLIC BLOOD PRESSURE: 143 MMHG | OXYGEN SATURATION: 95 % | TEMPERATURE: 98.3 F | HEART RATE: 97 BPM | DIASTOLIC BLOOD PRESSURE: 63 MMHG

## 2022-07-03 DIAGNOSIS — R10.84 ABDOMINAL PAIN, GENERALIZED: ICD-10-CM

## 2022-07-03 DIAGNOSIS — K59.00 CONSTIPATION, UNSPECIFIED CONSTIPATION TYPE: ICD-10-CM

## 2022-07-03 DIAGNOSIS — E86.0 DEHYDRATION: ICD-10-CM

## 2022-07-03 LAB
ALBUMIN SERPL-MCNC: 3.8 G/DL (ref 3.5–5)
ALP SERPL-CCNC: 69 U/L (ref 45–120)
ALT SERPL W P-5'-P-CCNC: 15 U/L (ref 0–45)
ANION GAP SERPL CALCULATED.3IONS-SCNC: 17 MMOL/L (ref 5–18)
AST SERPL W P-5'-P-CCNC: 11 U/L (ref 0–40)
BILIRUB SERPL-MCNC: 0.5 MG/DL (ref 0–1)
BUN SERPL-MCNC: 26 MG/DL (ref 8–28)
C REACTIVE PROTEIN LHE: 0.6 MG/DL (ref 0–?)
CALCIUM SERPL-MCNC: 10.8 MG/DL (ref 8.5–10.5)
CHLORIDE BLD-SCNC: 98 MMOL/L (ref 98–107)
CO2 SERPL-SCNC: 28 MMOL/L (ref 22–31)
CREAT SERPL-MCNC: 0.95 MG/DL (ref 0.6–1.1)
ERYTHROCYTE [DISTWIDTH] IN BLOOD BY AUTOMATED COUNT: 16.2 % (ref 10–15)
GFR SERPL CREATININE-BSD FRML MDRD: 61 ML/MIN/1.73M2
GLUCOSE BLD-MCNC: 128 MG/DL (ref 70–125)
HCT VFR BLD AUTO: 37.9 % (ref 35–47)
HGB BLD-MCNC: 10.8 G/DL (ref 11.7–15.7)
LACTATE SERPL-SCNC: 4.8 MMOL/L (ref 0.7–2)
LIPASE SERPL-CCNC: 22 U/L (ref 0–52)
MCH RBC QN AUTO: 23.1 PG (ref 26.5–33)
MCHC RBC AUTO-ENTMCNC: 28.5 G/DL (ref 31.5–36.5)
MCV RBC AUTO: 81 FL (ref 78–100)
PLATELET # BLD AUTO: 423 10E3/UL (ref 150–450)
POTASSIUM BLD-SCNC: 2.9 MMOL/L (ref 3.5–5)
PROT SERPL-MCNC: 6.9 G/DL (ref 6–8)
RBC # BLD AUTO: 4.67 10E6/UL (ref 3.8–5.2)
SODIUM SERPL-SCNC: 143 MMOL/L (ref 136–145)
TROPONIN I SERPL-MCNC: <0.01 NG/ML (ref 0–0.29)
WBC # BLD AUTO: 16.2 10E3/UL (ref 4–11)

## 2022-07-03 PROCEDURE — 250N000011 HC RX IP 250 OP 636: Performed by: EMERGENCY MEDICINE

## 2022-07-03 PROCEDURE — 36415 COLL VENOUS BLD VENIPUNCTURE: CPT | Performed by: EMERGENCY MEDICINE

## 2022-07-03 PROCEDURE — 83690 ASSAY OF LIPASE: CPT | Performed by: EMERGENCY MEDICINE

## 2022-07-03 PROCEDURE — 96374 THER/PROPH/DIAG INJ IV PUSH: CPT | Mod: 59

## 2022-07-03 PROCEDURE — 74177 CT ABD & PELVIS W/CONTRAST: CPT | Mod: MG

## 2022-07-03 PROCEDURE — 96375 TX/PRO/DX INJ NEW DRUG ADDON: CPT

## 2022-07-03 PROCEDURE — 80053 COMPREHEN METABOLIC PANEL: CPT | Performed by: EMERGENCY MEDICINE

## 2022-07-03 PROCEDURE — 85027 COMPLETE CBC AUTOMATED: CPT | Performed by: EMERGENCY MEDICINE

## 2022-07-03 PROCEDURE — 258N000003 HC RX IP 258 OP 636: Performed by: EMERGENCY MEDICINE

## 2022-07-03 PROCEDURE — 83605 ASSAY OF LACTIC ACID: CPT | Performed by: EMERGENCY MEDICINE

## 2022-07-03 PROCEDURE — 86140 C-REACTIVE PROTEIN: CPT | Performed by: EMERGENCY MEDICINE

## 2022-07-03 PROCEDURE — 84484 ASSAY OF TROPONIN QUANT: CPT | Performed by: EMERGENCY MEDICINE

## 2022-07-03 PROCEDURE — 96361 HYDRATE IV INFUSION ADD-ON: CPT

## 2022-07-03 PROCEDURE — 99285 EMERGENCY DEPT VISIT HI MDM: CPT | Mod: 25

## 2022-07-03 RX ORDER — LORAZEPAM 2 MG/ML
1 INJECTION INTRAMUSCULAR ONCE
Status: COMPLETED | OUTPATIENT
Start: 2022-07-03 | End: 2022-07-03

## 2022-07-03 RX ORDER — IOPAMIDOL 755 MG/ML
100 INJECTION, SOLUTION INTRAVASCULAR ONCE
Status: COMPLETED | OUTPATIENT
Start: 2022-07-03 | End: 2022-07-03

## 2022-07-03 RX ORDER — POTASSIUM CHLORIDE 1500 MG/1
40 TABLET, EXTENDED RELEASE ORAL ONCE
Status: COMPLETED | OUTPATIENT
Start: 2022-07-04 | End: 2022-07-04

## 2022-07-03 RX ORDER — SODIUM CHLORIDE 9 MG/ML
1000 INJECTION, SOLUTION INTRAVENOUS CONTINUOUS
Status: DISCONTINUED | OUTPATIENT
Start: 2022-07-03 | End: 2022-07-04 | Stop reason: HOSPADM

## 2022-07-03 RX ADMIN — SODIUM CHLORIDE 1000 ML: 9 INJECTION, SOLUTION INTRAVENOUS at 22:45

## 2022-07-03 RX ADMIN — FAMOTIDINE 20 MG: 10 INJECTION, SOLUTION INTRAVENOUS at 22:05

## 2022-07-03 RX ADMIN — IOPAMIDOL 100 ML: 755 INJECTION, SOLUTION INTRAVENOUS at 23:10

## 2022-07-03 RX ADMIN — LORAZEPAM 1 MG: 2 INJECTION INTRAMUSCULAR; INTRAVENOUS at 22:05

## 2022-07-04 LAB
ALBUMIN UR-MCNC: NEGATIVE MG/DL
APPEARANCE UR: CLEAR
BILIRUB UR QL STRIP: NEGATIVE
COLOR UR AUTO: COLORLESS
GLUCOSE UR STRIP-MCNC: 300 MG/DL
HGB UR QL STRIP: NEGATIVE
KETONES UR STRIP-MCNC: NEGATIVE MG/DL
LACTATE SERPL-SCNC: 3.4 MMOL/L (ref 0.7–2)
LEUKOCYTE ESTERASE UR QL STRIP: ABNORMAL
NITRATE UR QL: NEGATIVE
PH UR STRIP: 7 [PH] (ref 5–7)
PROCALCITONIN SERPL-MCNC: 0.06 NG/ML (ref 0–0.49)
RBC URINE: 0 /HPF
SP GR UR STRIP: 1.02 (ref 1–1.03)
SQUAMOUS EPITHELIAL: 1 /HPF
UROBILINOGEN UR STRIP-MCNC: <2 MG/DL
WBC URINE: 2 /HPF

## 2022-07-04 PROCEDURE — 36415 COLL VENOUS BLD VENIPUNCTURE: CPT | Performed by: EMERGENCY MEDICINE

## 2022-07-04 PROCEDURE — 81001 URINALYSIS AUTO W/SCOPE: CPT | Performed by: EMERGENCY MEDICINE

## 2022-07-04 PROCEDURE — 250N000013 HC RX MED GY IP 250 OP 250 PS 637: Performed by: EMERGENCY MEDICINE

## 2022-07-04 PROCEDURE — 84145 PROCALCITONIN (PCT): CPT | Performed by: EMERGENCY MEDICINE

## 2022-07-04 PROCEDURE — 258N000003 HC RX IP 258 OP 636: Performed by: EMERGENCY MEDICINE

## 2022-07-04 PROCEDURE — 83605 ASSAY OF LACTIC ACID: CPT | Performed by: EMERGENCY MEDICINE

## 2022-07-04 RX ADMIN — POTASSIUM CHLORIDE 40 MEQ: 1500 TABLET, EXTENDED RELEASE ORAL at 00:17

## 2022-07-04 RX ADMIN — SODIUM CHLORIDE 1000 ML: 9 INJECTION, SOLUTION INTRAVENOUS at 00:17

## 2022-07-04 NOTE — ED TRIAGE NOTES
Patients edema has been increasing since she was last here, states she has been taking her lasix as prescribed; non pitting edema to bilateral LE; states her abdomen is very distended but the pain has gotten worse and is having nausea, no vomitting

## 2022-07-04 NOTE — DISCHARGE INSTRUCTIONS
Drink plenty of water daily  Continue your medications as previously prescribed  Tylenol 650 mg every 4 hours as needed for pain  Follow-up with your doctor Tuesday or Wednesday for recheck  Return to the emergency department for worsening problems or concerns

## 2022-07-04 NOTE — ED PROVIDER NOTES
EMERGENCY DEPARTMENT ENCOUNTER      NAME: Jumana Yang  AGE: 81 year old female  YOB: 1940  MRN: [id number]  EVALUATION DATE & TIME: 7/3/2022  9:32 PM    PCP: Ananya Mclean    ED PROVIDER: Suleiman Rodriguez M.D.      Chief Complaint   Patient presents with     Abdominal Pain     Nausea         FINAL IMPRESSION:  Abdominal pain      ED COURSE & MEDICAL DECISION MAKING:    Pertinent Labs & Imaging studies reviewed. (See chart for details)  81 year old female presents to the Emergency Department for evaluation of abdominal pain.  Patient reports distention and discomfort.  Had abdominal pain for a few weeks but seem to worsen today.  She states she has been taking her Lasix as directed.  Was constipated for a number of days but then had a very large bowel movement today without obvious improvement.  Patient with history of chronic pain issues.  Recent evaluation with laboratory evaluation and CT imaging is unremarkable.  On exam she is in mild distress.  Abdomen is obese, soft with mild diffuse tenderness.  No rebound guarding or masses.  Labs sent for evaluation out of caution.  Imaging not ordered at this point given presentation and recent normal imaging.. Patient appears non toxic with stable vitals signs.    9:38 PM I met with the patient for the initial interview and physical examination. Discussed plan for treatment and workup in the ED.    10:26 PM.  Lactic acid moderately elevated.  Fluid bolus ordered.  Patient discussed with Dr. Camara at end of shift.  Patient likely will require repeat imaging given her reports and elevated lactate.  Disposition to be guided by laboratory and imaging results.  At the conclusion of the encounter I discussed the results of all of the tests and the disposition. The questions were answered and return precautions provided. The patient or family acknowledged understanding and was agreeable with the care plan.       PPE: Provider wore gloves, N95 mask, eye  protection, surgical cap.     MEDICATIONS GIVEN IN THE EMERGENCY:  Medications   famotidine (PEPCID) injection 20 mg (has no administration in time range)   LORazepam (ATIVAN) injection 1 mg (has no administration in time range)       NEW PRESCRIPTIONS STARTED AT TODAY'S ER VISIT  New Prescriptions    No medications on file          =================================================================    HPI    Patient information was obtained from: patient     Use of Intrepreter: N/A         Jumana Yang is a 81 year old female with a pertient medical history of obesity, diabetes mellitus II, chronic pain syndrome, IBS, hyperlipidemia, hypertension, COPD, asthma, GERD, laparoscopic partial hepatectomy (2013) who presents to the ED for evaluation of abdominal pain.    Per chart review,   The patient was seen in the ED on 6/16/2022 for pain following a fall. She also reported 3-4 weeks of constipation and a few days of abdominal bloating. CT abdomen without  signs of ascites, bowel obstruction, fecal impaction, or other acute abnormality. She was given IV Lasix, and she was advised to resume Lasix at home. Discharged in stable condition.     Per patient,   The patient reports generalized abdominal discomfort for a few weeks that worsened today. She also reports associated abdominal distension. She feels like her abdomen is going to explode. She has been taking her Lasix without improvement of the swelling. She also reports taking medication for constipation recently. She had a large bowel movement today without any improvement of her abdominal pain or distension, and she notes she still feels like she needs to have further bowel movements. She also reports feeling short of breath and difficulty eating due to her abdominal distension. She feels nauseated and had dry heaving and acid reflux after eating. Denies vomiting, diarrhea, or any other complaints at this time.       REVIEW OF SYSTEMS   Constitutional:  Denies  fever, chills. Positive for appetite change secondary to abdominal distention.   Respiratory:  Denies productive cough. Positive for SOB.   Cardiovascular:  Denies chest pain, palpitations  GI:  Denies vomiting, or change in bladder habits. Positive for abdominal pain, distention, nausea, reflux, constipation.   Musculoskeletal:  Denies any new muscle/joint swelling  Skin:  Denies rash   Neurologic:  Denies focal weakness  All systems negative except as marked.     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     Hepatic abscess      Hyperlipidemia      Hypertension      Irritable bowel syndrome      Meniere disease      Neuropathy      Noninfectious ileitis      Peritoneal abscess (H)      Spinal stenosis of lumbar region      Steroid long-term use      Vaginitis, atrophic, postmenopausal      Vitamin D deficiency        PAST SURGICAL HISTORY:  Past Surgical History:   Procedure Laterality Date     CATARACT IOL, RT/LT Left 7/2013     FOOT SURGERY      toe     GI SURGERY       IR LUMBAR/SACRAL TRANSFOR INJ BILATERAL Bilateral 3/11/2022     LAPAROSCOPIC HEPATECTOMY PARTIAL  11/4/2013    Procedure: LAPAROSCOPIC HEPATECTOMY PARTIAL;  Laparoscopic Debridement of Liver Abcess;  Surgeon: Sepideh Green MD;  Location: [facility] OR     ORTHOPEDIC SURGERY       PICC INSERTION  8/28/2013    5fr DL Power PICC, 41cm (1cm external length) in the R lateral brachial vein with tip in the SVC RA junction.     RECTAL SURGERY      1970s     VASCULAR SURGERY           CURRENT MEDICATIONS:      Current Facility-Administered Medications:       famotidine (PEPCID) injection 20 mg, 20 mg, Intravenous, Once, Suleiman Rodriguez MD     LORazepam (ATIVAN) injection 1 mg, 1 mg, Intravenous, Once, Suleiman Rodriguez MD    Current Outpatient Medications:      acetaminophen (TYLENOL) 325 MG tablet, Take 1-2 tablets (325-650 mg) by mouth every 6 hours as needed for mild pain, Disp: , Rfl:      atorvastatin (LIPITOR) 10 MG tablet, Take 10 mg by mouth At Bedtime, Disp: , Rfl:      blood glucose (ACCU-CHEK FASTCLIX) lancing device, FOR TESTING ONCE DAILY. DX  E11.9 TYPE 2 DIABETES, Disp: , Rfl:      blood glucose (CONTOUR TEST) test strip, TESTING EVERY DAY DX  E11.9, Disp: , Rfl:      calamine 8-8 % lotion, Apply topically every hour as needed for itching, Disp: 118 mL, Rfl: 0     CONTOUR NEXT TEST test strip, TESTING EVERY DAY DX  E11.9, Disp: , Rfl:      cyanocobalamin 1000 MCG SUBL, Place 1,000 mcg under the tongue daily, Disp: , Rfl:      DIAZEPAM 5 MG/5ML solution, Take 0.5 mg by mouth 2 times daily as needed for anxiety, Disp: , Rfl:      diclofenac (VOLTAREN) 1 % topical gel, Apply 4 g topically 4 times daily (Patient taking differently: Apply 4 g topically 4 times daily as needed), Disp: 150 g, Rfl: 0     DULoxetine (CYMBALTA) 60 MG EC capsule, TAKE 1 CAPSULE (60 MG) BY MOUTH DAILY, Disp: 90 capsule, Rfl: 1     ferrous sulfate (FEROSUL) 325 (65 Fe) MG tablet, Take 1 tablet (325 mg) by mouth daily, Disp: , Rfl:      furosemide (LASIX) 40 MG tablet, Take 1 tablet (40 mg) by mouth daily, Disp: 30 tablet, Rfl: 0     glipiZIDE (GLUCOTROL) 10 MG tablet, Take 1 tablet (10 mg) by mouth 2 times daily (before meals), Disp: , Rfl:      hydrOXYzine (ATARAX) 25 MG tablet, Take 25 mg by mouth 3 times daily as needed for anxiety, Disp: , Rfl:      JARDIANCE 25 MG TABS tablet, Take 25 mg by mouth daily , Disp: , Rfl:      lamoTRIgine (LAMICTAL) 100 MG tablet, Take 100 mg by mouth 2 times daily, Disp: , Rfl:      melatonin 1 MG TABS tablet, Take 1 tablet (1 mg) by mouth nightly  as needed for sleep, Disp: , Rfl:      metFORMIN (GLUCOPHAGE) 500 MG tablet, Take 750 mg by mouth daily (with dinner), Disp: , Rfl:      methocarbamol (ROBAXIN) 500 MG tablet, Take 1 tablet (500 mg) by mouth 3 times daily as needed for muscle spasms, Disp: 60 tablet, Rfl: 0     miconazole (MICATIN) 2 % external powder, Apply topically 2 times daily as needed To the skin fold, Disp: , Rfl:      mineral oil-hydrophilic petrolatum (AQUAPHOR) external ointment, Apply topically 3 times daily as needed To the dry skin, Disp: , Rfl:      order for DME, Equipment being ordered: wrist brace, Disp: 1 Device, Rfl: 0     oxybutynin ER (DITROPAN XL) 15 MG 24 hr tablet, Take 15 mg by mouth daily, Disp: , Rfl:      oxyCODONE-acetaminophen (PERCOCET) 5-325 MG tablet, Take 1 tablet by mouth every 6 hours as needed for severe pain (Max 4 tablets daily), Disp: 20 tablet, Rfl: 0     pantoprazole (PROTONIX) 40 MG EC tablet, Take 1 tablet (40 mg) by mouth every morning (before breakfast), Disp: 30 tablet, Rfl: 0     polyethylene glycol (MIRALAX) 17 GM/Dose powder, Take 17 g by mouth daily (Patient taking differently: Take 17 g by mouth daily as needed for constipation), Disp: 510 g, Rfl: 0     potassium chloride ER (KLOR-CON M) 10 MEQ CR tablet, Take 1 tablet (10 mEq) by mouth daily, Disp: 30 tablet, Rfl: 0     predniSONE (DELTASONE) 1 MG tablet, Take 6 mg by mouth daily , Disp: , Rfl:      pregabalin (LYRICA) 25 MG capsule, Take 1 capsule (25 mg) by mouth daily, Disp: 30 capsule, Rfl: 0     pregabalin (LYRICA) 50 MG capsule, Take 1 capsule (50 mg) by mouth At Bedtime, Disp: 30 capsule, Rfl: 0     senna-docusate (SENOKOT-S/PERICOLACE) 8.6-50 MG tablet, Take 1 tablet by mouth 2 times daily (Patient taking differently: Take 1 tablet by mouth 2 times daily as needed), Disp: 30 tablet, Rfl: 0     triamcinolone (KENALOG) 0.1 % external cream, Apply topically 2 times daily as needed , Disp: , Rfl:      vitamin D3 (CHOLECALCIFEROL) 50 mcg (2000  units) tablet, Take 3 tablets by mouth daily, Disp: , Rfl:     ALLERGIES:  Allergies   Allergen Reactions     Propofol Nausea and Vomiting     Shellfish Allergy      Other reaction(s): Dizziness     Capsaicin Rash and Blisters       FAMILY HISTORY:  Family History   Problem Relation Age of Onset     Cerebrovascular Disease Mother      Heart Disease Mother      Heart Disease Father      Heart Disease Brother      Diabetes No family hx of      Coronary Artery Disease No family hx of      Hypertension No family hx of      Hyperlipidemia No family hx of      Breast Cancer No family hx of      Colon Cancer No family hx of      Prostate Cancer No family hx of      Other Cancer No family hx of      Depression No family hx of      Anxiety Disorder No family hx of      Mental Illness No family hx of      Substance Abuse No family hx of      Anesthesia Reaction No family hx of      Asthma No family hx of      Osteoporosis No family hx of      Genetic Disorder No family hx of      Thyroid Disease No family hx of      Obesity No family hx of      Unknown/Adopted No family hx of        SOCIAL HISTORY:   Social History     Socioeconomic History     Marital status:    Tobacco Use     Smoking status: Former Smoker     Types: Cigarettes     Quit date: 11/15/1987     Years since quittin.6     Smokeless tobacco: Former User   Substance and Sexual Activity     Alcohol use: Not Currently     Alcohol/week: 0.0 standard drinks     Comment: MALISSA white since      Drug use: No     Comment: used marijuanna in the past     Sexual activity: Never     Partners: Male   Other Topics Concern     Parent/sibling w/ CABG, MI or angioplasty before 65F 55M? No   Social History Narrative    ** Merged History Encounter **            VITALS:  Patient Vitals for the past 24 hrs:   BP Temp Temp src Pulse Resp SpO2   22 2136 139/64 98.3  F (36.8  C) Oral 97 20 96 %        PHYSICAL EXAM    Constitutional:  Awake, alert, in mild  distress, anxious  HENT:  Normocephalic, Atraumatic. Bilateral external ears normal. Oropharynx moist. Nose normal. Neck- Normal range of motion with no guarding, No midline cervical tenderness, Supple, No stridor.   Eyes:  PERRL, EOMI with no signs of entrapment, Conjunctiva normal, No discharge.   Respiratory:  Normal breath sounds, No respiratory distress, No wheezing.    Cardiovascular:  Normal heart rate, Normal rhythm, No appreciable rubs or gallops.   GI:  Obese, Soft, mild diffuse tenderness, distension, No palpable masses  Musculoskeletal:  Intact distal pulses, mild lower extremity edema. Good range of motion in all major joints. No tenderness to palpation or major deformities noted.  Integument:  Warm, Dry, No erythema, No rash.   Neurologic:  Alert & oriented, Normal motor function, Normal sensory function, No focal deficits noted.   Psychiatric: Anxious, Judgment normal, Mood normal.     LAB:  All pertinent labs reviewed and interpreted.  Results for orders placed or performed during the hospital encounter of 07/03/22   Lactic acid whole blood     Status: Abnormal   Result Value Ref Range    Lactic Acid 4.8 (HH) 0.7 - 2.0 mmol/L   CBC (+ platelets, no diff)     Status: Abnormal   Result Value Ref Range    WBC Count 16.2 (H) 4.0 - 11.0 10e3/uL    RBC Count 4.67 3.80 - 5.20 10e6/uL    Hemoglobin 10.8 (L) 11.7 - 15.7 g/dL    Hematocrit 37.9 35.0 - 47.0 %    MCV 81 78 - 100 fL    MCH 23.1 (L) 26.5 - 33.0 pg    MCHC 28.5 (L) 31.5 - 36.5 g/dL    RDW 16.2 (H) 10.0 - 15.0 %    Platelet Count 423 150 - 450 10e3/uL     RADIOLOGY:  Reviewed all pertinent imaging. Please see official radiology report.  No orders to display           I, Harika Barksdale, am serving as a scribe to document services personally performed by Suleiman Rodriguez MD, based on my observation and the provider's statements to me. ISuleiman MD attest that Harika Barksdale is acting in a scribe capacity, has observed my performance of the services  and has documented them in accordance with my direction.    Suleiman Rodriguez M.D.  Emergency Medicine  [facility] EMERGENCY ROOM       Suleiman Rodriguez MD  07/03/22 9351

## 2022-07-04 NOTE — ED NOTES
Bed: [facility]10  Expected date: 7/3/22  Expected time: 9:25 PM  Means of arrival: Ambulance  Comments:

## 2022-07-04 NOTE — ED NOTES
EMERGENCY DEPARTMENT SIGN OUT NOTE        ED COURSE AND MEDICAL DECISION MAKING  10:30 PM Patient was signed out to me by Dr Suleiman Rodriguez   10:35 PM Rechecked and updated patient.  Normal saline 1 L IV was being administered for her elevated lactic acid and suspected dehydration.  Repeat examination revealed pain with palpation in the left lower quadrant.  CT abdomen is pending.  11:57 PM Rechecked and updated patient. Patient is anxious and reports he discomfort is unchanged.  Potassium 40 mEq p.o. was administered for hypokalemia.  12:30 AM Rechecked and updated patient.  Lactic acid is improving after IV fluids.  Abdomen pelvis CT reveals constipation and no other more worrisome etiology of her pain.  Although her white count is elevated, the rest of her inflammatory markers are normal and she has no evidence of infection at this time.  I think she can continue outpatient treatment of her constipation with close follow-up with primary care.  I discussed plans for discharge with the patient, which they were agreeable to. We discussed supportive cares at home and reasons for return to the ER including new or worsening symptoms. All questions and concerns were addressed. Patient to be discharged by RN.        In brief, Jumana Yang is a 81 year old female who initially presented abdominal pain. Patient reports generalized abdominal discomfort for a few weeks that worsened today. She also reports associated abdominal distension. She feels like her abdomen is going to explode. She had a large bowel movement today without any improvement of her abdominal pain or distension, and she notes she still feels like she needs to have further bowel movements. She also reports feeling short of breath and difficulty eating due to her abdominal distension. She feels nauseated and had dry heaving and acid reflux after eating.    At time of sign out, disposition was pending CT imaging, labs, and disposition.    FINAL IMPRESSION    1.  Abdominal pain, generalized    2. Constipation, unspecified constipation type    3. Dehydration        ED MEDS  Medications   sodium chloride 0.9% infusion (0 mLs Intravenous Stopped 7/4/22 0041)   famotidine (PEPCID) injection 20 mg (20 mg Intravenous Given 7/3/22 2205)   LORazepam (ATIVAN) injection 1 mg (1 mg Intravenous Given 7/3/22 2205)   0.9% sodium chloride BOLUS (0 mLs Intravenous Stopped 7/4/22 0000)   iopamidol (ISOVUE-370) solution 100 mL (100 mLs Intravenous Given 7/3/22 2310)   potassium chloride ER (KLOR-CON M) CR tablet 40 mEq (40 mEq Oral Given 7/4/22 0017)       LAB  Labs Ordered and Resulted from Time of ED Arrival to Time of ED Departure   COMPREHENSIVE METABOLIC PANEL - Abnormal       Result Value    Sodium 143      Potassium 2.9 (*)     Chloride 98      Carbon Dioxide (CO2) 28      Anion Gap 17      Urea Nitrogen 26      Creatinine 0.95      Calcium 10.8 (*)     Glucose 128 (*)     Alkaline Phosphatase 69      AST 11      ALT 15      Protein Total 6.9      Albumin 3.8      Bilirubin Total 0.5      GFR Estimate 61     LACTIC ACID WHOLE BLOOD - Abnormal    Lactic Acid 4.8 (*)    ROUTINE UA WITH MICROSCOPIC REFLEX TO CULTURE - Abnormal    Color Urine Colorless      Appearance Urine Clear      Glucose Urine 300  (*)     Bilirubin Urine Negative      Ketones Urine Negative      Specific Gravity Urine 1.020      Blood Urine Negative      pH Urine 7.0      Protein Albumin Urine Negative      Urobilinogen Urine <2.0      Nitrite Urine Negative      Leukocyte Esterase Urine 25 Angy/uL (*)     RBC Urine 0      WBC Urine 2      Squamous Epithelials Urine 1     CBC WITH PLATELETS - Abnormal    WBC Count 16.2 (*)     RBC Count 4.67      Hemoglobin 10.8 (*)     Hematocrit 37.9      MCV 81      MCH 23.1 (*)     MCHC 28.5 (*)     RDW 16.2 (*)     Platelet Count 423     LACTIC ACID WHOLE BLOOD - Abnormal    Lactic Acid 3.4 (*)    LIPASE - Normal    Lipase 22     CRP INFLAMMATION - Normal    CRP 0.6      PROCALCITONIN - Normal    Procalcitonin 0.06     TROPONIN I - Normal    Troponin I <0.01         RADIOLOGY    CT Abdomen Pelvis w Contrast   Final Result   IMPRESSION:    1.  Suspected constipation.   2.  Colonic diverticulosis, without evidence of diverticulitis.   3.  Hepatomegaly and hepatic steatosis.   4.  Small noncalcified pulmonary nodules, largest measuring 4 mm. Follow-up according Fleischner criteria, as detailed below.      Fleischner Society Recommendations for Pulmonary Nodules      Nodule size less than 6 mm:       Nodules < 6 mm do not require routine follow-up, but certain patients at high risk with suspicious nodule morphology, upper lobe location, or both may warrant 12-month follow-up.           Aracelis Corrales MD  [facility] EMERGENCY ROOM  [address] [facility] [address] [phone]       Aracelis Corrales MD  07/04/22 0126

## 2022-07-04 NOTE — TELEPHONE ENCOUNTER
Davie (son) calls and says that his mother had a bloated abdomen 2 weeks ago and has taken many laxatives. Davie says that his mother had a stool today and is passing a lot of gas. Davie says that his mother's abdomen is bloated again. Davie says that his mother really wants to go to the ER and says that he will take his mother to the [facility] ER now. COVID 19 Nurse Triage Plan/Patient Instructions    Please be aware that novel coronavirus (COVID-19) may be circulating in the community. If you develop symptoms such as fever, cough, or SOB or if you have concerns about the presence of another infection including coronavirus (COVID-19), please contact your health care provider or visit [email or web address].     Disposition/Instructions    ED Visit recommended. Follow protocol based instructions.     Bring Your Own Device:  Please also bring your smart device(s) (smart phones, tablets, laptops) and their charging cables for your personal use and to communicate with your care team during your visit.    Thank you for taking steps to prevent the spread of this virus.  o Limit your contact with others.  o Wear a simple mask to cover your cough.  o Wash your hands well and often.    Resources    [facility]: About COVID-19: [email or web address]    CDC: What to Do If You're Sick: www.cdc.gov/coronavirus/2019-ncov/about/steps-when-sick.html    CDC: Ending Home Isolation: www.cdc.gov/coronavirus/2019-ncov/hcp/disposition-in-home-patients.html     CDC: Caring for Someone: www.cdc.gov/coronavirus/2019-ncov/if-you-are-sick/care-for-someone.html     [facility]: Interim Guidance for Hospital Discharge to Home: www.health.[facility].mn.us/diseases/coronavirus/hcp/hospdischarge.pdf    [facility] clinical trials (COVID-19 research studies): [email or web address]/umn-clinical-trials     Below are the COVID-19 hotlines at the [place] Department of [facility] ([facility]). Interpreters are available.   o For  health questions: Call 553-751-6081 or 1-530.748.6601 (7 a.m. to 7 p.m.)  o For questions about schools and childcare: Call 695-166-5422 or 1-934.220.6623 (7 a.m. to 7 p.m.)                     Reason for Disposition    Swelling of both ankles (i.e., pedal edema)    Entire foot is cool or blue in comparison to other side    Additional Information    Negative: Chest pain    Negative: Followed an ankle injury    Negative: Ankle pain is main symptom    Negative: Severe difficulty breathing (e.g., struggling for each breath, speaks in single words)    Negative: Looks like a broken bone or dislocated joint (e.g., crooked or deformed)    Negative: Sounds like a life-threatening emergency to the triager    Negative: Chest pain    Negative: Followed a leg injury    Negative: [1] Small area of swelling AND [2] followed an insect bite to the area    Negative: Swelling of one ankle joint    Negative: Swelling of knee is main symptom    Negative: Pregnant    Negative: Postpartum (from 0 to 6 weeks after delivery)    Negative: Difficulty breathing at rest    Protocols used: ANKLE SWELLING-A-AH, LEG SWELLING AND EDEMA-A-AH

## 2022-07-11 ENCOUNTER — OFFICE VISIT (OUTPATIENT)
Dept: AUDIOLOGY | Facility: CLINIC | Age: 82
End: 2022-07-11
Payer: COMMERCIAL

## 2022-07-11 ENCOUNTER — TELEPHONE (OUTPATIENT)
Dept: AUDIOLOGY | Facility: CLINIC | Age: 82
End: 2022-07-11

## 2022-07-11 DIAGNOSIS — H90.3 SENSORY HEARING LOSS, BILATERAL: Primary | ICD-10-CM

## 2022-07-11 PROCEDURE — V5266 BATTERY FOR HEARING DEVICE: HCPCS | Mod: NU | Performed by: AUDIOLOGIST

## 2022-07-11 NOTE — TELEPHONE ENCOUNTER
Walk-in hearing aid services on 7/11/22: The patient's son brought in her older Rexton BTE hearing aid to be cleaned and checked.  She would like to have it available to use so her current Claudio hearing aid can be brought in for servicing.  The hearing aid and earmold were cleaned and a piece of wax was removed from inside the earmold tubing.  The patient's son was provided with 24 size 13 batteries today, which will be billed to insurance.

## 2022-08-02 ENCOUNTER — TRANSCRIBE ORDERS (OUTPATIENT)
Dept: VASCULAR SURGERY | Facility: CLINIC | Age: 82
End: 2022-08-02

## 2022-08-02 DIAGNOSIS — R60.9 EDEMA: Primary | ICD-10-CM

## 2022-08-03 ENCOUNTER — TELEPHONE (OUTPATIENT)
Dept: OTHER | Facility: CLINIC | Age: 82
End: 2022-08-03

## 2022-08-03 NOTE — TELEPHONE ENCOUNTER
Left voicemail with instructions for patient to call back to schedule their appointment(s)    August 3, 2022 , 3:06 PM

## 2022-08-03 NOTE — TELEPHONE ENCOUNTER
Pt referred to [facility] by Ananya Mclean NP for edema.    Pt needs to be scheduled for consult with Vascular Medicine/Caitlin Osullivan PA-C.  Will route to scheduling to coordinate an appointment at next available.    Appt note: Ref by Ananya Mclean NP for edema; notes in Epic.    WAYNE BarnettN, RN-[facility]

## 2022-08-08 ENCOUNTER — TELEPHONE (OUTPATIENT)
Dept: AUDIOLOGY | Facility: CLINIC | Age: 82
End: 2022-08-08

## 2022-08-08 NOTE — TELEPHONE ENCOUNTER
Walk-in hearing aid services on 8/8/22: The patient's son brought her current left hearing aid and earmold to the clinic so the tubing could be replaced.  The hearing aid and earmold were cleaned and the earmold tubing was replaced.  The hearing aid was found to be working normally and was returned to the patient's son.

## 2022-08-09 ENCOUNTER — OFFICE VISIT (OUTPATIENT)
Dept: OTHER | Facility: CLINIC | Age: 82
End: 2022-08-09
Attending: NURSE PRACTITIONER
Payer: MEDICARE

## 2022-08-09 VITALS
OXYGEN SATURATION: 95 % | DIASTOLIC BLOOD PRESSURE: 64 MMHG | HEIGHT: 65 IN | WEIGHT: 189 LBS | HEART RATE: 81 BPM | SYSTOLIC BLOOD PRESSURE: 142 MMHG | BODY MASS INDEX: 31.49 KG/M2

## 2022-08-09 DIAGNOSIS — I87.2 PERIPHERAL VENOUS INSUFFICIENCY: ICD-10-CM

## 2022-08-09 DIAGNOSIS — I89.0 LYMPHEDEMA: Primary | ICD-10-CM

## 2022-08-09 DIAGNOSIS — R60.9 EDEMA, UNSPECIFIED TYPE: ICD-10-CM

## 2022-08-09 PROCEDURE — G0463 HOSPITAL OUTPT CLINIC VISIT: HCPCS

## 2022-08-09 PROCEDURE — 99205 OFFICE O/P NEW HI 60 MIN: CPT | Performed by: PHYSICIAN ASSISTANT

## 2022-08-09 NOTE — PROGRESS NOTES
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     In 5/2022 she was seen by Vascular Surgery as CTA of the left lower extremity for colder, discolored foot showed loss of the peroneal artery in the midcalf region with no significant reconstitution. There was loss of the anterior tibial artery in the lower calf region. It was felt that posterior tibial artery extended into the foot although this was difficult to determine given motion. There was soft tissue edema seen in both lower extremities greatest in the left lower extremity. JOAQUIN was 1.0 on left and 0.89 on the right with TBI's adequate for healing. It was felt that her progressive decline in ability to ambulate was not secondary to her very mild PAD.     She has more recently felt like her abdomen is filling up with fluid. She has presented for this to the ED twice with CT with no acute findings other than likely constipation. They did give her some IV lasix last visit and she felt that this did help. She has been on Lasix 40 mg daily. Amlodipine was discontinued. She is on chronic prednisone at 6 mg daily.     She is unable to get any compression socks on. She is always sitting in her wheelchair with legs down.  She is very tearful over the fact that she can no longer walk on her own. She feels  her legs are very heavy and full of fluid making it difficult to walk. Her son is with her today and is active in her care.        PAST MEDICAL HISTORY  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     Hepatic abscess      Hyperlipidemia      Hypertension      Irritable bowel syndrome      Meniere disease      Neuropathy      Noninfectious ileitis      Peritoneal abscess (H)      Spinal stenosis of lumbar region      Steroid long-term use      Vaginitis, atrophic, postmenopausal      Vitamin D deficiency        CURRENT MEDICATIONS  atorvastatin (LIPITOR) 10 MG tablet, Take 10 mg by mouth At Bedtime  blood glucose (ACCU-CHEK FASTCLIX) lancing device, FOR TESTING ONCE DAILY. DX  E11.9 TYPE 2 DIABETES  blood glucose (CONTOUR TEST) test strip, TESTING EVERY DAY DX  E11.9  calamine 8-8 % lotion, Apply topically every hour as needed for itching  CONTOUR NEXT TEST test strip, TESTING EVERY DAY DX  E11.9  cyanocobalamin 1000 MCG SUBL, Place 1,000 mcg under the tongue daily  DIAZEPAM 5 MG/5ML solution, Take 0.5 mg by mouth 2 times daily as needed for anxiety  DULoxetine (CYMBALTA) 60 MG EC capsule, TAKE 1 CAPSULE (60 MG) BY MOUTH DAILY  ferrous sulfate (FEROSUL) 325 (65 Fe) MG tablet, Take 1 tablet (325 mg) by mouth daily  furosemide (LASIX) 40 MG tablet, Take 1 tablet (40 mg) by mouth daily  glipiZIDE (GLUCOTROL) 10 MG tablet, Take 1 tablet (10 mg) by mouth 2 times daily (before meals)  hydrOXYzine (ATARAX) 25 MG tablet, Take 25 mg by mouth 3 times daily as needed for anxiety  JARDIANCE 25 MG TABS tablet, Take 25 mg by mouth daily   lamoTRIgine (LAMICTAL)  100 MG tablet, Take 100 mg by mouth 2 times daily  melatonin 1 MG TABS tablet, Take 1 tablet (1 mg) by mouth nightly as needed for sleep  metFORMIN (GLUCOPHAGE) 500 MG tablet, Take 750 mg by mouth daily (with dinner)  methocarbamol (ROBAXIN) 500 MG tablet, Take 1 tablet (500 mg) by mouth 3 times daily as needed for muscle spasms  miconazole (MICATIN) 2 % external powder, Apply topically 2 times daily as needed To the skin fold  order for DME, Equipment being ordered: wrist brace  oxybutynin ER (DITROPAN XL) 15 MG 24 hr tablet, Take 15 mg by mouth daily  oxyCODONE-acetaminophen (PERCOCET) 5-325 MG tablet, Take 1 tablet by mouth every 6 hours as needed for severe pain (Max 4 tablets daily)  pantoprazole (PROTONIX) 40 MG EC tablet, Take 1 tablet (40 mg) by mouth every morning (before breakfast)  polyethylene glycol (MIRALAX) 17 GM/Dose powder, Take 17 g by mouth daily (Patient taking differently: Take 17 g by mouth daily as needed for constipation)  potassium chloride ER (KLOR-CON M) 10 MEQ CR tablet, Take 1 tablet (10 mEq) by mouth daily  predniSONE (DELTASONE) 1 MG tablet, Take 6 mg by mouth daily   pregabalin (LYRICA) 25 MG capsule, Take 1 capsule (25 mg) by mouth daily  pregabalin (LYRICA) 50 MG capsule, Take 1 capsule (50 mg) by mouth At Bedtime  senna-docusate (SENOKOT-S/PERICOLACE) 8.6-50 MG tablet, Take 1 tablet by mouth 2 times daily (Patient taking differently: Take 1 tablet by mouth 2 times daily as needed)  triamcinolone (KENALOG) 0.1 % external cream, Apply topically 2 times daily as needed   vitamin D3 (CHOLECALCIFEROL) 50 mcg (2000 units) tablet, Take 3 tablets by mouth daily  [DISCONTINUED] amLODIPine (NORVASC) 10 MG tablet, Take 1 tablet (10 mg) by mouth daily  [DISCONTINUED] gabapentin (NEURONTIN) 100 MG capsule, Take 200 mg by mouth 2 times daily    No current facility-administered medications on file prior to visit.      PAST SURGICAL HISTORY:  Past Surgical History:   Procedure Laterality Date      CATARACT IOL, RT/LT Left 7/2013     FOOT SURGERY      toe     GI SURGERY       IR LUMBAR/SACRAL TRANSFOR INJ BILATERAL Bilateral 3/11/2022     LAPAROSCOPIC HEPATECTOMY PARTIAL  11/4/2013    Procedure: LAPAROSCOPIC HEPATECTOMY PARTIAL;  Laparoscopic Debridement of Liver Abcess;  Surgeon: Sepideh Green MD;  Location: [facility] OR     ORTHOPEDIC SURGERY       PICC INSERTION  8/28/2013    5fr DL Power PICC, 41cm (1cm external length) in the R lateral brachial vein with tip in the SVC RA junction.     RECTAL SURGERY      1970s     VASCULAR SURGERY         ALLERGIES     Allergies   Allergen Reactions     Propofol Nausea and Vomiting     Shellfish Allergy      Other reaction(s): Dizziness     Capsaicin Rash and Blisters       FAMILY HISTORY  Family History   Problem Relation Age of Onset     Cerebrovascular Disease Mother      Heart Disease Mother      Heart Disease Father      Heart Disease Brother      Diabetes No family hx of      Coronary Artery Disease No family hx of      Hypertension No family hx of      Hyperlipidemia No family hx of      Breast Cancer No family hx of      Colon Cancer No family hx of      Prostate Cancer No family hx of      Other Cancer No family hx of      Depression No family hx of      Anxiety Disorder No family hx of      Mental Illness No family hx of      Substance Abuse No family hx of      Anesthesia Reaction No family hx of      Asthma No family hx of      Osteoporosis No family hx of      Genetic Disorder No family hx of      Thyroid Disease No family hx of      Obesity No family hx of      Unknown/Adopted No family hx of      SOCIAL HISTORY  Social History     Socioeconomic History     Marital status:      Spouse name: Not on file     Number of children: Not on file     Years of education: Not on file     Highest education level: Not on file   Occupational History     Not on file   Tobacco Use     Smoking status: Former Smoker     Types: Cigarettes     Quit date:  "11/15/1987     Years since quittin.7     Smokeless tobacco: Former User   Substance and Sexual Activity     Alcohol use: Not Currently     Alcohol/week: 0.0 standard drinks     Comment: MALISSA -ber since      Drug use: No     Comment: used marijuanna in the past     Sexual activity: Never     Partners: Male   Other Topics Concern     Parent/sibling w/ CABG, MI or angioplasty before 65F 55M? No       ROS:   General: No change in weight, sleep or appetite.  Poor energy.  No fever or chills  Eyes: Negative for vision changes or eye problems  ENT: Hearing problems as above, no nose or throat problems.  No difficulty swallowing.  Resp: No coughing, wheezing or shortness of breath  CV: No chest pains or palpitations  GI: No nausea, vomiting,  Heartburn, + abdominal pain and constipation recently  : No urinary frequency or dysuria, bladder or kidney problems  Musculoskeletal: No significant muscle or joint pains. Legs heavy, unable to ambulate now.   Neurologic: No headaches. + neuropathy.  Psychiatric: +anxiety, depression  Heme/immune/allergy: No history of bleeding or clotting problems or anemia.  No allergies or immune system problems  Endocrine: No history of thyroid disease, + DM  Skin: No rashes,worrisome lesions or skin problems  Vascular:  No claudication, lifestyle limiting or otherwise; no ischemic rest pain; no non-healing ulcers. No weakness, No loss of sensation     EXAM:  BP (!) 142/64 (BP Location: Left arm, Patient Position: Chair, Cuff Size: Adult Regular)   Pulse 81   Ht 5' 5\" (1.651 m)   Wt 189 lb (85.7 kg)   LMP  (LMP Unknown)   SpO2 95%   BMI 31.45 kg/m    In general, the patient is a pleasant female in no apparent distress sitting in wheelchair.    HEENT: NC/AT.  PERRLA.  EOMI.  Sclerae white, not injected. Can't hear out of right ear, some loss of hearing out of left ear.   Heart: RRR. Normal S1, S2 splits physiologically. No murmur, rub, click, or gallop.  Lungs: CTA.  No ronchi, " wheezes, rales.   Abdomen: Soft, nontender, nondistended.   Extremities: No clubbing, cyanosis. Bilateral lower extremity edema. No wounds. Spider veins present in feet and ankles. Palpable pedal pulses.      Labs:  LIPID RESULTS:  Lab Results   Component Value Date    CHOL 154 05/23/2017    HDL 43 (L) 05/23/2017    LDL 50 05/23/2017    TRIG 305 (H) 05/23/2017    CHOLHDLRATIO 2.8 06/04/2015       LIVER ENZYME RESULTS:  Lab Results   Component Value Date    AST 11 07/03/2022    AST 25 02/14/2017    ALT 15 07/03/2022    ALT 26 02/14/2017       CBC RESULTS:  Lab Results   Component Value Date    WBC 16.2 (H) 07/03/2022    WBC 8.9 04/12/2016    RBC 4.67 07/03/2022    RBC 3.99 04/12/2016    HGB 10.8 (L) 07/03/2022    HGB 12.2 10/24/2017    HCT 37.9 07/03/2022    HCT 36.0 04/12/2016    MCV 81 07/03/2022    MCV 90 04/12/2016    MCH 23.1 (L) 07/03/2022    MCH 26.1 (L) 04/12/2016    MCHC 28.5 (L) 07/03/2022    MCHC 28.9 (L) 04/12/2016    RDW 16.2 (H) 07/03/2022    RDW 15.8 (H) 04/12/2016     07/03/2022     04/12/2016       BMP RESULTS:  Lab Results   Component Value Date     07/03/2022     10/24/2017    POTASSIUM 2.9 (L) 07/03/2022    POTASSIUM 3.9 10/24/2017    CHLORIDE 98 07/03/2022    CHLORIDE 101 10/24/2017    CO2 28 07/03/2022    CO2 28 10/24/2017    ANIONGAP 17 07/03/2022    ANIONGAP 10 10/24/2017     (H) 07/03/2022     (H) 10/24/2017    BUN 26 07/03/2022    BUN 18 10/24/2017    CR 0.95 07/03/2022    CR 0.84 10/24/2017    GFRESTIMATED 61 07/03/2022    GFRESTIMATED >60 12/29/2020    GFRESTIMATED 66 10/24/2017    GFRESTBLACK >60 12/29/2020    GFRESTBLACK 80 10/24/2017    MACY 10.8 (H) 07/03/2022    MACY 9.5 10/24/2017        A1C RESULTS:  Lab Results   Component Value Date    A1C 8.8 (H) 03/08/2022    A1C 6.8 (H) 10/24/2017       THYROID RESULTS:  Lab Results   Component Value Date    TSH 1.86 09/06/2016       Procedures:   [facility]  CT OF THE CHEST,  ABDOMEN, PELVIS, AND BILATERAL LOWER EXTREMITIES USING CTA TECHNIQUE WITH RUNOFF.  5/26/2022 10:00 PM     INDICATION: cold, pale bilateral feet, dimished pulse in right foot. no pulse in left foot  TECHNIQUE: Noncontrast. Axial, sagittal and coronal thin-section reconstruction. Dose reduction techniques were used.  COMPARISON: None.     FINDINGS: CTA examination of the chest demonstrates mild calcification of the thoracic aorta with no evidence for significant stenosis, aneurysmal dilatation, dissection, or intraluminal clot.     CTA examination of the abdomen demonstrates mild vascular calcification involving the abdominal aorta with no evidence for significant stenosis, aneurysmal dilatation, dissection, or intraluminal clot identified.     CTA examination of the pelvis demonstrates mild vascular calcification with no evidence for significant stenosis, aneurysmal dilatation, dissection, or intraluminal clot. There is good flow extending into both groins.     CTA examination of the right lower extremity shows the catheter vessels to be small in size, but are patent with three-vessel flow to the ankle and two-vessel flow into the foot via the tibial arteries.     CTA of the left lower extremity shows loss of the peroneal artery in the midcalf region with no significant reconstitution. There is loss of the anterior tibial artery in the lower calf region. I believe the posterior tibial artery extend into the foot   although this is difficult to determine given motion.     CT of the chest demonstrates mild to moderate calcification most prominent in the LAD. There is slight dependent atelectasis seen in both lower lobes. There are minimal findings of centrilobular emphysema seen in both lungs.     CT examination of the abdomen demonstrates fatty infiltration of liver. The gallbladder and bile ducts are normal in caliber. The pancreas is normal. The spleen is normal. The adrenals are normal. There are scattered benign  simple cysts seen in both   kidneys with no follow-up recommended. The bowel demonstrates mild findings of obstipation in the colon. There is localized narrowing seen involving the hepatic flexure of the colon this likely represents an area of spasm since there is no evidence for   stranding of the adjacent fat to suggest colitis. There are moderate findings of diverticulosis with no evidence for diverticulitis. The pelvic organs demonstrates a calcification in the atrophic uterus most typical for some fibroids. The pelvic organs   are otherwise normal. The musculoskeletal region demonstrates a small fatty umbilical hernia. There are some scattered injection granulomas in the buttocks. Significant degenerative changes are seen throughout the spine to include degenerative disc   disease at the L4 and L5 levels.                                                                      IMPRESSION:     1. There is some loss of detail given patient motion especially in the lower extremities.     2. CTA of the left lower extremity shows loss of the peroneal artery in the midcalf region with no significant reconstitution. There is loss of the anterior tibial artery in the lower calf region. I believe the posterior tibial artery extend into the   foot although this is difficult to determine given motion. There is soft tissue edema seen in both lower extremities greatest in the left lower extremity.     3.There is localized narrowing seen involving the hepatic flexure of the colon this likely represents an area of spasm since there is no evidence for stranding of the adjacent fat to suggest colitis.       EXAM: RESTING ANKLE-BRACHIAL INDICES (ABIs)  LOCATION: [facility]  DATE/TIME: 5/27/2022 7:12 AM     INDICATION: PAD.  COMPARISON: None.     JOAQUIN FINDINGS:  RIGHT  Brachial: 176  Ankle (PT): 157 Index: 0.87  Ankle (DP): 161 Index: 0.89  Digit: 73 Index: 0.41     LEFT  Brachial: 180  Ankle (PT): 172 Index:  0.96  Ankle (DP): 186 Index: 1.0  Digit: 46 Index: 0.26     The right JOAQUIN at rest is 0.89. The left JOAQUIN at rest is 1.0.                                                                        IMPRESSION:  1.  RIGHT LOWER EXTREMITY: JOAQUIN at rest is borderline abnormal. Right digital index is decreased however digital pressures are adequate for healing potential.  2.  LEFT LOWER EXTREMITY: JOAQUIN at rest is normal. Left digital index is decreased however digital pressures are adequate for healing potential.        Assessment and Plan:   Bilateral lower extremity edema    -This is likely secondary to chronic venous insufficieny given obesity, presence of spider veins on exam.   -Unable to get strong enough compression socks on.   -Swelling exacerbated by legs always in dependant position while sitting in wheelchair all day.     Recommendations:   -Prescription given for velcro compression socks as well as regular 20-30 mmHg compression socks in case her home nurse could help her get them on. She should wear these for the next 6 months. If she is not happy with the results after wearing these on a consistent basis, we can re-evaluate and undertake venous competency testing.   -Will obtain an echocardiogram to rule out any cardiac causes of her edema.   -Check TSH.   -Discussed with her and her son that she should elevate her legs when sitting around. Her son reports that she sits all day in her chair with her legs down. This likely significantly contributes to her edema.         Caitlin Osullivan PA-C      60 minutes spent on the date of the encounter doing chart review, history and exam, documentation, and further activities as noted above.

## 2022-08-09 NOTE — PROGRESS NOTES
"Patient is here to discuss consult.    BP (!) 142/64 (BP Location: Left arm, Patient Position: Chair, Cuff Size: Adult Regular)   Pulse 81   Ht 5' 5\" (1.651 m)   Wt 189 lb (85.7 kg)   LMP  (LMP Unknown)   SpO2 95%   BMI 31.45 kg/m      Questions patient would like addressed today are: Patient has tingling in toes as well as neuropathy on feet.    Refills are needed: N/A    Has homecare services and agency name:  Yes: [facility], but  nurse comes from [facility] once a week.    Karthikeyan Hart  "

## 2022-08-09 NOTE — PATIENT INSTRUCTIONS
Get an echocardiogram (ultrasound) of your heart.     2. Get lab draw to check your thyroid.     3. Follow-up with us after this is completed.     4. Get velcro wraps or compression socks to wear during the day and remove them at night. This will help any venous insufficiency (valves not functioning in veins correctly). Wear these for 6 months and we will re-evaluate at that time.     5. Call us with any questions or concerns ([phone]).

## 2022-08-15 ENCOUNTER — LAB (OUTPATIENT)
Dept: LAB | Facility: CLINIC | Age: 82
End: 2022-08-15
Payer: MEDICARE

## 2022-08-15 DIAGNOSIS — R60.9 EDEMA, UNSPECIFIED TYPE: ICD-10-CM

## 2022-08-15 LAB — TSH SERPL DL<=0.005 MIU/L-ACNC: 0.68 MU/L (ref 0.4–4)

## 2022-08-15 PROCEDURE — 84443 ASSAY THYROID STIM HORMONE: CPT

## 2022-08-15 PROCEDURE — 36415 COLL VENOUS BLD VENIPUNCTURE: CPT

## 2022-09-14 ENCOUNTER — OFFICE VISIT (OUTPATIENT)
Dept: RHEUMATOLOGY | Facility: CLINIC | Age: 82
End: 2022-09-14
Payer: MEDICARE

## 2022-09-14 VITALS — SYSTOLIC BLOOD PRESSURE: 130 MMHG | HEART RATE: 84 BPM | DIASTOLIC BLOOD PRESSURE: 74 MMHG

## 2022-09-14 DIAGNOSIS — M47.819 MULTILEVEL SPONDYLOSIS: ICD-10-CM

## 2022-09-14 DIAGNOSIS — M25.50 POLYARTHRALGIA: Primary | ICD-10-CM

## 2022-09-14 DIAGNOSIS — M15.0 PRIMARY OSTEOARTHRITIS INVOLVING MULTIPLE JOINTS: ICD-10-CM

## 2022-09-14 PROCEDURE — 99214 OFFICE O/P EST MOD 30 MIN: CPT | Mod: 25 | Performed by: INTERNAL MEDICINE

## 2022-09-14 PROCEDURE — 20604 DRAIN/INJ JOINT/BURSA W/US: CPT | Mod: 59 | Performed by: INTERNAL MEDICINE

## 2022-09-14 PROCEDURE — 20604 DRAIN/INJ JOINT/BURSA W/US: CPT | Mod: F1 | Performed by: INTERNAL MEDICINE

## 2022-09-14 NOTE — PROGRESS NOTES
Rheumatology follow-up visit note     Jumana is a 81 year old female presents today for follow-up.    Jumana was seen today for recheck.    Diagnoses and all orders for this visit:    Polyarthralgia  -     CO ARTHROCNT ASPIR&/INJ SMALL JT/BURSAW/US REC RPRT  -     triamcinolone acetonide (KENALOG-10) injection 10 mg    Primary osteoarthritis involving multiple joints  -     triamcinolone acetonide (KENALOG-10) injection 10 mg  -     CO ARTHROCNT ASPIR&/INJ SMALL JT/BURSAW/US REC RPRT  -     triamcinolone acetonide (KENALOG-10) injection 10 mg    Multilevel spondylosis        This patient has polyarthralgia in the background of osteoarthritis at 1 point elsewhere she had carried a diagnosis of rheumatoid arthritis, she has been chronically on prednisone for ER related symptoms recently increase the dose that she is going to start today.  She would like to proceed local injection MCP 2 and 3 which was done after pros and cons were outlined with the help of her daughter, 10 mg of Kenalog injected intra-articularly into the above-noted joints under ultrasound guidance.  In part given the past history noted above I have asked her to come back for reassessment in 3 the next few weeks, as she begins and ends the course of prednisone for her ear related symptoms.         HPI    Jumana Yang is a 81 year old female is here for follow-up of  She has widespread osteoarthritis.  At 1 point thought to have rheumatoid arthritis/inflammatory joint disease, this was elsewhere.  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  Recently her hearing has worsened again.  As she has been asked to take 30 mg of prednisone that she is going to start today.  She has noted worsening pain in her hand especially the left side pointing to the MCP 2 and 3.  She is not sure about swelling.  She has virtually no hearing labs now and all  conversation happened writing down a piece of paper plus with the help of her caregiver.  Further historical information, including ROS and limitation in activities as noted in the multidimensional health assessment questionnaire scanned in the EMR and in the assessment and       DETAILED EXAMINATION  09/14/22  :    Vitals:    09/14/22 1340   BP: 130/74   Pulse: 84     Alert oriented. Head including the face is examined for malar rash, heliotropes, scarring, lupus pernio. Eyes examined for redness such as in episcleritis/scleritis, periorbital lesions.   Neck/ Face examined for parotid gland swelling, range of motion of neck.  Left upper and lower and right upper and lower extremities examined for tenderness, swelling, warmth of the appendicular joints, range of motion, edema, rash.  Some of the important findings included: she does not have evidence of synovitis in the palpable joints of the upper extremities.  She has tenderness of the PIPs, left second and third MCPs without clear evidence of synovitis.        Patient Active Problem List    Diagnosis Date Noted     Spinal stenosis 05/31/2022     Priority: Medium     Lower extremity edema 05/31/2022     Priority: Medium     Claudication of both lower extremities (H) 05/27/2022     Priority: Medium     Type 2 diabetes mellitus with hyperglycemia, unspecified whether long term insulin use (H) 05/27/2022     Priority: Medium     Hypokalemia 04/22/2022     Priority: Medium     Contusion of coccyx, initial encounter 04/22/2022     Priority: Medium     Weakness 04/07/2022     Priority: Medium     Fall, initial encounter 04/07/2022     Priority: Medium     Bilateral hearing loss, unspecified hearing loss type 03/24/2022     Priority: Medium     Spasm of back muscles 03/24/2022     Priority: Medium     Neural foraminal stenosis of cervical spine 03/24/2022     Priority: Medium     Multilevel spondylosis 03/24/2022     Priority: Medium     Anxiety and depression 03/24/2022      Priority: Medium     Slow transit constipation 03/24/2022     Priority: Medium     Paravertebral mass 03/13/2022     Priority: Medium     Abnormal finding on CT scan 03/13/2022     Priority: Medium     Pneumonia of right lower lobe due to infectious organism 03/07/2022     Priority: Medium     Severe sepsis (H) 03/07/2022     Priority: Medium     Type 2 diabetes mellitus with hyperglycemia (H) 03/07/2022     Priority: Medium     Glycosuria 03/07/2022     Priority: Medium     Confusion 07/20/2021     Priority: Medium     Hyperglycemia 07/20/2021     Priority: Medium     Acute renal failure, unspecified acute renal failure type (H) 07/20/2021     Priority: Medium     Bilateral deafness 01/23/2018     Priority: Medium     Class 2 obesity due to excess calories with serious comorbidity and body mass index (BMI) of 37.0 to 37.9 in adult 10/24/2017     Priority: Medium     Type 2 diabetes mellitus with diabetic nephropathy, without long-term current use of insulin (H) 02/21/2017     Priority: Medium     Muscle weakness (generalized) worse since 11-16 w drowsiness 02/14/2017     Priority: Medium     Chronic pain syndrome-issue of broken controlled sub agreement  04/25/2016     Priority: Medium     Patient is followed by Bethanie Finnegan MD for ongoing prescription of pain medication.  All refills should be approved by this provider, or covering partner.    AGREEMENT BROKEN  By getting meds from an outside provider  See 4-2016 notes     Medication(s): oxycodone a 5mgm /day .   Maximum quantity per month: 30   Clinic visit frequency required: Q 3 months     Controlled substance agreement on file: Yes       Date(s): 9-23-15    Pain Clinic evaluation in the past: No    DIRE Total Score(s):  No flowsheet data found.    Last [facility] website verification:  done on 5-2-16   [email or web address]         Bilateral leg edema worsening w her increasing inactivity  04/25/2016     Priority: Medium     Sensorineural hearing  loss, bilateral 04/19/2016     Priority: Medium     Long-term (current) use of anticoagulants [Z79.01] 03/22/2016     Priority: Medium     Essential hypertension 03/22/2016     Priority: Medium     Ankle edema 03/22/2016     Priority: Medium     Microalbuminuria 03/17/2016     Priority: Medium     Leukocytosis w Lt shift  03/17/2016     Priority: Medium     Localized edema-L>R lat malleolus 02/20/2016     Priority: Medium     Mixed hyperlipidemia 02/18/2016     Priority: Medium     Primary insomnia 12/03/2015     Priority: Medium     Bilateral back pain 11/12/2015     Priority: Medium     Left-sided low back pain with left-sided sciatica since 1993 w reoccurrence 6-15  11/03/2015     Priority: Medium     Meniere's disease (cochlear hydrops), LT 11/03/2015     Priority: Medium     Steroid dependent for cochlear hydrops  since 1985 11/03/2015     Priority: Medium     Lower urinary tract infectious disease 10/25/2015     Priority: Medium     Diagnosis updated by automated process. Provider to review and confirm.       Ambriz's cyst, left 07/17/2015     Priority: Medium     Diarrhea r/o exacerbated by metformin -since 30's 06/30/2015     Priority: Medium     Bipolar disorder, in full remission, most recent episode depressed (H) 06/04/2015     Priority: Medium     Temporomandibular joint disorder 12/01/2014     Priority: Medium     Problem list name updated by automated process. Provider to review       Elevated liver enzymes since 12-13 liver abscess I&D 01/16/2014     Priority: Medium     Abscess,& FB in liver in 4-10 & 4-11 w recurrent pain in RUQ  in 7-13 s/po lap I&D and unroofing sans finding of an FB  in 11-13 11/26/2013     Priority: Medium     Deafness 10/30/2013     Priority: Medium     Iron deficiency anemia due to chronic blood loss 10/03/2013     Priority: Medium     Health Care Home 08/30/2013     Priority: Medium     [facility]  Patient Care Plan  About Me  Patient Name:  Tonya JOHN  Trey    YOB: 1940  Age:   72 year old   [facility] MRN: [id number] Telephone Information:     Home Phone [phone]   Mobile [phone]       Address:    [address]  [place] [address] Email address:  No e-mail address on record      Emergency Contact(s)  Name Relationship Lgl Grd Work Phone Home Phone Mobile Phone   MANJU MATOS Son   [phone]            Primary language:  English     needed? No   [facility] [employer]:  [phone] op. 1  Other communication barriers: Hard of hearing  Preferred Method of Communication:  Phone  Current living arrangement: I live in a private home with family  Mobility Status/ Medical Equipment:    Other information to know about me:    Health Maintenance  Immunizations:     Cancer Screening:     My Access Plan  Medical Emergency 911   Primary Clinic Line [phone]   24 Hour Appointment Line [phone] or  [phone]) (toll-free)   24 Hour Nurse Line [phone] (toll-free)   Preferred Urgent Care     Preferred [facility]   Preferred Pharmacy [id number] - [place], [place] - [address]     Behavioral Health Crisis Line [facility], [phone] or [phone]     My Care Team Members  Patient Care Team       Relationship Specialty Notifications Start End    Bethanie Finnegan MD PCP - General Family Practice  8/22/13     Phone: [phone] Fax: [phone]         [facility] [address] [facility] [address]    Valencia Sanchez RN [facility] Care Coordinator   8/30/13     Phone: [phone] Fax: [phone]             My Care Plans  Self Management and Treatment Plan  Presenting Concern Signs and Symptoms Goal/Action Plan   Home IV antibiotics x 8 weeks  [facility] - [phone]   Start Date:8/30/13 Active/ Initiated with: Date Reviewed:           Start Date: Active/Complete Initiated with: Date Reviewed:           Start Date: Active/Complete  Initiated with: Date Reviewed:       Action Plans on File: None  Advance Care Plans/Directives on file:  No           My Medical and Care Information  Problem List   Patient Active Problem List   Diagnosis     Postmenopausal atrophic vaginitis     Steroid dependent     Bipolar disorder     Chronic diarrhea     Type 2 diabetes, HbA1C goal < 7%     Hyperlipidemia LDL goal <100     IBS (irritable bowel syndrome)     Major depression in complete remission     Obesity     Meniere's disease (cochlear hydrops)     GERD (gastroesophageal reflux disease)     Hypertension goal BP (blood pressure) < 130/80     CKD (chronic kidney disease) stage 3, GFR 30-59 ml/min     Tobacco abuse: [age] y/o @ 2ppd= 65pk yr hx      Obstructive lung disease : moderate/spirometry w oximetry=91% on 7-18-13      Anemia     Abscess,& FB in liver in 4-10 & 4-11 w recurrent pain in RUQ  in 7-13      Hepatic abscess            Allergies   Allergies   Allergen Reactions     Propofol      Nausea and vomiting        Health Care Home Complexity Tier:      Care Coordination Start Date: 08/30/13   Frequency of Care Coordination: every 2 weeks   Form Last Updated: 08/30/2013            CKD (chronic kidney disease) stage 3, GFR 30-59 ml/min (H) 07/18/2013     Priority: Medium     IBS (irritable bowel syndrome) 06/20/2013     Priority: Medium     Meniere's disease (cochlear hydrops) - on prednisone and triamterene hydrochlorothiazide 06/20/2013     Priority: Medium     GERD (gastroesophageal reflux disease) 06/20/2013     Priority: Medium     Postmenopausal atrophic vaginitis 02/13/2013     Priority: Medium     Past Surgical History:   Procedure Laterality Date     CATARACT IOL, RT/LT Left 7/2013     FOOT SURGERY      toe     GI SURGERY       IR LUMBAR/SACRAL TRANSFOR INJ BILATERAL Bilateral 3/11/2022     LAPAROSCOPIC HEPATECTOMY PARTIAL  11/4/2013    Procedure: LAPAROSCOPIC HEPATECTOMY PARTIAL;  Laparoscopic Debridement of Liver Abcess;  Surgeon: Peter  Sepideh Aden MD;  Location: [facility] OR     ORTHOPEDIC SURGERY       PICC INSERTION  8/28/2013    5fr DL Power PICC, 41cm (1cm external length) in the R lateral brachial vein with tip in the SVC RA junction.     RECTAL SURGERY      1970s     VASCULAR SURGERY        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     Hepatic abscess      Hyperlipidemia      Hypertension      Irritable bowel syndrome      Meniere disease      Neuropathy      Noninfectious ileitis      Peritoneal abscess (H)      Spinal stenosis of lumbar region      Steroid long-term use      Vaginitis, atrophic, postmenopausal      Vitamin D deficiency      Allergies   Allergen Reactions     Propofol Nausea and Vomiting     Shellfish Allergy      Other reaction(s): Dizziness     Capsaicin Rash and Blisters     Current Outpatient Medications   Medication Sig Dispense Refill     atorvastatin (LIPITOR) 10 MG tablet Take 10 mg by mouth At Bedtime       blood glucose (ACCU-CHEK FASTCLIX) lancing device FOR TESTING ONCE DAILY. DX  E11.9 TYPE 2 DIABETES       blood glucose (CONTOUR TEST) test strip TESTING EVERY DAY DX  E11.9       calamine 8-8 % lotion Apply topically every hour as needed for itching 118 mL 0     CONTOUR NEXT TEST test strip TESTING EVERY DAY DX  E11.9       cyanocobalamin 1000 MCG SUBL Place 1,000 mcg under the tongue daily       DIAZEPAM 5 MG/5ML solution Take 0.5 mg by mouth 2 times daily as needed for anxiety       DULoxetine (CYMBALTA) 60 MG EC capsule TAKE 1 CAPSULE (60 MG) BY MOUTH DAILY 90 capsule 1     ferrous sulfate (FEROSUL) 325 (65 Fe) MG tablet Take 1 tablet (325  mg) by mouth daily       furosemide (LASIX) 40 MG tablet Take 1 tablet (40 mg) by mouth daily 30 tablet 0     glipiZIDE (GLUCOTROL) 10 MG tablet Take 1 tablet (10 mg) by mouth 2 times daily (before meals)       hydrOXYzine (ATARAX) 25 MG tablet Take 25 mg by mouth 3 times daily as needed for anxiety       JARDIANCE 25 MG TABS tablet Take 25 mg by mouth daily        lamoTRIgine (LAMICTAL) 100 MG tablet Take 100 mg by mouth 2 times daily       melatonin 1 MG TABS tablet Take 1 tablet (1 mg) by mouth nightly as needed for sleep       metFORMIN (GLUCOPHAGE) 500 MG tablet Take 750 mg by mouth daily (with dinner)       methocarbamol (ROBAXIN) 500 MG tablet Take 1 tablet (500 mg) by mouth 3 times daily as needed for muscle spasms 60 tablet 0     miconazole (MICATIN) 2 % external powder Apply topically 2 times daily as needed To the skin fold       order for DME Equipment being ordered: wrist brace 1 Device 0     oxybutynin ER (DITROPAN XL) 15 MG 24 hr tablet Take 15 mg by mouth daily       oxyCODONE-acetaminophen (PERCOCET) 5-325 MG tablet Take 1 tablet by mouth every 6 hours as needed for severe pain (Max 4 tablets daily) 20 tablet 0     pantoprazole (PROTONIX) 40 MG EC tablet Take 1 tablet (40 mg) by mouth every morning (before breakfast) 30 tablet 0     polyethylene glycol (MIRALAX) 17 GM/Dose powder Take 17 g by mouth daily (Patient taking differently: Take 17 g by mouth daily as needed for constipation) 510 g 0     potassium chloride ER (KLOR-CON M) 10 MEQ CR tablet Take 1 tablet (10 mEq) by mouth daily 30 tablet 0     predniSONE (DELTASONE) 1 MG tablet Take 6 mg by mouth daily        pregabalin (LYRICA) 25 MG capsule Take 1 capsule (25 mg) by mouth daily 30 capsule 0     pregabalin (LYRICA) 50 MG capsule Take 1 capsule (50 mg) by mouth At Bedtime 30 capsule 0     senna-docusate (SENOKOT-S/PERICOLACE) 8.6-50 MG tablet Take 1 tablet by mouth 2 times daily (Patient taking differently: Take 1 tablet by mouth 2 times  daily as needed) 30 tablet 0     triamcinolone (KENALOG) 0.1 % external cream Apply topically 2 times daily as needed        vitamin D3 (CHOLECALCIFEROL) 50 mcg (2000 units) tablet Take 3 tablets by mouth daily       family history includes Cerebrovascular Disease in her mother; Heart Disease in her brother, father, and mother.  Social Connections: Not on file          WBC   Date Value Ref Range Status   04/12/2016 8.9 4.0 - 11.0 10e9/L Final     WBC Count   Date Value Ref Range Status   07/03/2022 16.2 (H) 4.0 - 11.0 10e3/uL Final     RBC Count   Date Value Ref Range Status   07/03/2022 4.67 3.80 - 5.20 10e6/uL Final   04/12/2016 3.99 3.8 - 5.2 10e12/L Final     Hemoglobin   Date Value Ref Range Status   07/03/2022 10.8 (L) 11.7 - 15.7 g/dL Final   10/24/2017 12.2 11.7 - 15.7 g/dL Final     Hematocrit   Date Value Ref Range Status   07/03/2022 37.9 35.0 - 47.0 % Final   04/12/2016 36.0 35.0 - 47.0 % Final     MCV   Date Value Ref Range Status   07/03/2022 81 78 - 100 fL Final   04/12/2016 90 78 - 100 fl Final     MCH   Date Value Ref Range Status   07/03/2022 23.1 (L) 26.5 - 33.0 pg Final   04/12/2016 26.1 (L) 26.5 - 33.0 pg Final     Platelet Count   Date Value Ref Range Status   07/03/2022 423 150 - 450 10e3/uL Final   04/12/2016 287 150 - 450 10e9/L Final     % Lymphocytes   Date Value Ref Range Status   06/16/2022 25 % Final   04/12/2016 24.6 % Final     AST   Date Value Ref Range Status   07/03/2022 11 0 - 40 U/L Final   02/14/2017 25 0 - 45 U/L Final     ALT   Date Value Ref Range Status   07/03/2022 15 0 - 45 U/L Final   02/14/2017 26 0 - 50 U/L Final     Albumin   Date Value Ref Range Status   07/03/2022 3.8 3.5 - 5.0 g/dL Final   02/14/2017 3.6 3.4 - 5.0 g/dL Final     Alkaline Phosphatase   Date Value Ref Range Status   07/03/2022 69 45 - 120 U/L Final   02/14/2017 85 40 - 150 U/L Final     Creatinine   Date Value Ref Range Status   07/03/2022 0.95 0.60 - 1.10 mg/dL Final   10/24/2017 0.84 0.52 - 1.04  mg/dL Final     GFR Estimate   Date Value Ref Range Status   07/03/2022 61 >60 mL/min/1.73m2 Final     Comment:     Effective December 21, 2021 eGFRcr in adults is calculated using the 2021 CKD-EPI creatinine equation which includes age and gender (Teena et al., NEJ, DOI: 10.1056/DCWSfv0957011)   12/29/2020 >60 >60 mL/min/1.73m2 Final   10/24/2017 66 >60 mL/min/1.7m2 Final     Comment:     Non  GFR Calc     GFR Estimate If Black   Date Value Ref Range Status   12/29/2020 >60 >60 mL/min/1.73m2 Final   10/24/2017 80 >60 mL/min/1.7m2 Final     Comment:      GFR Calc     Creatinine Urine   Date Value Ref Range Status   02/14/2017 82 mg/dL Final     Sed Rate   Date Value Ref Range Status   10/23/2013 10 0 - 30 mm/h Final     Erythrocyte Sedimentation Rate   Date Value Ref Range Status   03/07/2022 5 0 - 20 mm/hr Final     C-Reactive Protein   Date Value Ref Range Status   04/21/2011 4.4 0.0 - 4.9 mg/L Final     CRP Inflammation   Date Value Ref Range Status   10/23/2013 8.4 (H) 0.0 - 8.0 mg/L Final     CRP   Date Value Ref Range Status   07/03/2022 0.6 0.0 - <0.8 mg/dL Final

## 2022-09-19 ENCOUNTER — ANCILLARY PROCEDURE (OUTPATIENT)
Dept: GENERAL RADIOLOGY | Facility: CLINIC | Age: 82
End: 2022-09-19
Attending: PHYSICIAN ASSISTANT
Payer: MEDICARE

## 2022-09-19 ENCOUNTER — OFFICE VISIT (OUTPATIENT)
Dept: URGENT CARE | Facility: URGENT CARE | Age: 82
End: 2022-09-19
Payer: MEDICARE

## 2022-09-19 VITALS
HEART RATE: 79 BPM | TEMPERATURE: 98.2 F | OXYGEN SATURATION: 94 % | DIASTOLIC BLOOD PRESSURE: 59 MMHG | SYSTOLIC BLOOD PRESSURE: 133 MMHG
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
## 2022-09-19 DIAGNOSIS — S05.11XA: ICD-10-CM
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
## 2022-09-19 DIAGNOSIS — S20.211A RIB CONTUSION, RIGHT, INITIAL ENCOUNTER: ICD-10-CM

## 2022-09-19 DIAGNOSIS — M54.50 ACUTE RIGHT-SIDED LOW BACK PAIN WITHOUT SCIATICA: ICD-10-CM

## 2022-09-19 PROCEDURE — 96372 THER/PROPH/DIAG INJ SC/IM: CPT | Performed by: PHYSICIAN ASSISTANT

## 2022-09-19 PROCEDURE — 99214 OFFICE O/P EST MOD 30 MIN: CPT | Mod: 25 | Performed by: PHYSICIAN ASSISTANT

## 2022-09-19 PROCEDURE — 70200 X-RAY EXAM OF EYE SOCKETS: CPT | Mod: TC | Performed by: STUDENT IN AN ORGANIZED HEALTH CARE EDUCATION/TRAINING PROGRAM

## 2022-09-19 RX ORDER — KETOROLAC TROMETHAMINE 30 MG/ML
30 INJECTION, SOLUTION INTRAMUSCULAR; INTRAVENOUS ONCE
Status: COMPLETED | OUTPATIENT
Start: 2022-09-19 | End: 2022-09-19

## 2022-09-19 RX ORDER — LIDOCAINE 50 MG/G
2 PATCH TOPICAL EVERY 24 HOURS
Qty: 20 PATCH | Refills: 0 | Status: SHIPPED | OUTPATIENT
Start: 2022-09-19 | End: 2023-03-21

## 2022-09-19 RX ADMIN — KETOROLAC TROMETHAMINE 30 MG: 30 INJECTION, SOLUTION INTRAMUSCULAR; INTRAVENOUS at 19:51

## 2022-09-19 ASSESSMENT — VISUAL ACUITY: OU: 1

## 2022-09-19 NOTE — PROGRESS NOTES
Assessment & Plan     Fall, initial encounter  - XR Ribs & Chest Right G/E 3 Views  - XR Lumbar Spine 2/3 Views  - XR Orbits G/E 3 Views    Traumatic ecchymosis of orbit, right, initial encounter  - XR Orbits G/E 3 Views  - ketorolac (TORADOL) injection 30 mg    Rib contusion, right, initial encounter  - XR Ribs & Chest Right G/E 3 Views  - ketorolac (TORADOL) injection 30 mg  - lidocaine (LIDODERM) 5 % patch  Dispense: 20 patch; Refill: 0    Acute right-sided low back pain without sciatica  - XR Lumbar Spine 2/3 Views  - ketorolac (TORADOL) injection 30 mg  - lidocaine (LIDODERM) 5 % patch  Dispense: 20 patch; Refill: 0       Pt presents with R sided orbital ecchymosis, R sided posterior rib pain and generalized lumbar back pain following a fall 9/17. Pt was walking using walker with her son when she lost her balance and fell onto a table, hitting her eye. She is neurologically intact and concerned about rib and orbit fracture. Xray of spine, rib/chest and orbit showed no fracture or acute concerns. Pt requested ativan, valium and dilaudid as she feels her pain control is not adequate. Pt is currently taking percocet QID, discussed with patient and son the risks of overmedicating. Pt will continue percocet QID and begin using lidocaine patches and ice. Pt received 30 mg IM toradol in clinic for pain and swelling. Fall prevention also discussed with patient. Pt currently receives assistance at home with ADLs and uses assistive devices. Pt has home PT appt on Friday. Pt will follow up with PCP if any visual changes occur, develops extremity weakness or increased pain.      Katherine Velasquez PA-C  [facility] MELI Terry is a 81 year old female who presents to clinic today for the following health issues:  Chief Complaint   Patient presents with     Urgent Care     Fall x 3 days rib pain. Right side of body is painful as well     HPI    Pt has been hospitalized frequently for  numerous falls  Fell sat 9/17 on right side when walking with walker, son present  Denies CP and SOB  No issues with bowel or bladder  Pt denies weakness, numbness and tingling    Pt is mainly concerned about posterior lower ribs and R orbit  Spinal pain  R buttock/hip pain  R eye bruised  Mild BLE edema  bilat knee tenderness    Takes oxycodone but this is not enough for the pain  requesting dilaudid and valium     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     Hepatic abscess      Hyperlipidemia      Hypertension      Irritable bowel syndrome      Meniere disease      Neuropathy      Noninfectious ileitis      Peritoneal abscess (H)      Spinal stenosis of lumbar region      Steroid long-term use      Vaginitis, atrophic, postmenopausal      Vitamin D deficiency      Current Outpatient Medications   Medication     atorvastatin (LIPITOR) 10 MG tablet     blood glucose (ACCU-CHEK FASTCLIX) lancing device     blood glucose (CONTOUR TEST) test strip     calamine 8-8 % lotion     CONTOUR NEXT TEST test strip     cyanocobalamin 1000 MCG SUBL     DIAZEPAM 5 MG/5ML solution     DULoxetine (CYMBALTA) 60 MG EC capsule     ferrous sulfate (FEROSUL) 325 (65 Fe) MG tablet     furosemide (LASIX) 40 MG tablet     glipiZIDE (GLUCOTROL) 10 MG tablet     hydrOXYzine (ATARAX) 25 MG tablet     JARDIANCE 25 MG TABS tablet     lamoTRIgine (LAMICTAL) 100 MG tablet     lidocaine (LIDODERM) 5 % patch     melatonin 1 MG TABS tablet     metFORMIN (GLUCOPHAGE) 500 MG tablet     methocarbamol (ROBAXIN) 500 MG tablet     miconazole (MICATIN) 2 % external powder     order for  DME     oxybutynin ER (DITROPAN XL) 15 MG 24 hr tablet     oxyCODONE-acetaminophen (PERCOCET) 5-325 MG tablet     pantoprazole (PROTONIX) 40 MG EC tablet     polyethylene glycol (MIRALAX) 17 GM/Dose powder     potassium chloride ER (KLOR-CON M) 10 MEQ CR tablet     predniSONE (DELTASONE) 1 MG tablet     pregabalin (LYRICA) 25 MG capsule     pregabalin (LYRICA) 50 MG capsule     senna-docusate (SENOKOT-S/PERICOLACE) 8.6-50 MG tablet     triamcinolone (KENALOG) 0.1 % external cream     vitamin D3 (CHOLECALCIFEROL) 50 mcg (2000 units) tablet     No current facility-administered medications for this visit.     Social History     Socioeconomic History     Marital status:      Spouse name: Not on file     Number of children: Not on file     Years of education: Not on file     Highest education level: Not on file   Occupational History     Not on file   Tobacco Use     Smoking status: Former Smoker     Types: Cigarettes     Quit date: 11/15/1987     Years since quittin.8     Smokeless tobacco: Former User   Substance and Sexual Activity     Alcohol use: Not Currently     Alcohol/week: 0.0 standard drinks     Comment: MALISSA white since      Drug use: No     Comment: used marijuanna in the past     Sexual activity: Never     Partners: Male   Other Topics Concern     Parent/sibling w/ CABG, MI or angioplasty before 65F 55M? No   Social History Narrative    ** Merged History Encounter **          Social Determinants of Health     Financial Resource Strain: Not on file   Food Insecurity: Not on file   Transportation Needs: Not on file   Physical Activity: Not on file   Stress: Not on file   Social Connections: Not on file   Intimate Partner Violence: Not on file   Housing Stability: Not on file         Review of Systems  Constitutional, HEENT, cardiovascular, pulmonary, gi and gu systems are negative, except as otherwise noted.      Objective    LMP  (LMP Unknown)   Physical Exam  Eyes:      General: Vision  grossly intact.      Extraocular Movements: Extraocular movements intact.      Conjunctiva/sclera: Conjunctivae normal.      Comments: R orbit ecchymotic, edematous and tender   Musculoskeletal:         General: Tenderness present.      Lumbar back: Tenderness present.      Right lower le+ Pitting Edema present.      Left lower le+ Pitting Edema present.   Skin:     Findings: Bruising present.   Neurological:      General: No focal deficit present.      Mental Status: She is alert and oriented to person, place, and time.          Results for orders placed or performed in visit on 22   XR Orbits G/E 3 Views     Status: None    Narrative    EXAM: XR ORBITS G/E 3 VIEWS  LOCATION: [facility]  DATE/TIME: 2022 7:24 PM    INDICATION: fall 2 days ago and black eye upper lid  COMPARISON: None.  TECHNIQUE: CR Orbits.      Impression    IMPRESSION:  No gross abnormality of the orbits, however CT is the preferred modality to evaluate for subtle fractures and recommended to further evaluate if clinically concerned for acute fracture. Both eyes are negative for metallic foreign bodies.   Results for orders placed or performed in visit on 22   XR Lumbar Spine 2/3 Views     Status: None    Narrative    EXAM: XR LUMBAR SPINE 2/3 VIEWS  LOCATION: [facility]  DATE/TIME: 2022 7:24 PM    INDICATION: fall and hx of spinal stenosis   pain on right  COMPARISON: 2022.  TECHNIQUE: CR Lumbar Spine.      Impression    IMPRESSION: 5 lumbar vertebrae. Alignment is within normal limits.     No displaced fracture. Vertebral body heights are maintained. No aggressive osseous abnormality. Moderate to advanced degenerative disc disease at L5-S1. Mild degenerative disc disease at the main lumbar spine and at the level of T12-L1. Diffuse lumbar   facet arthropathy. No acute extraspinal abnormality.   Results for orders placed or performed in visit on 22   XR Ribs & Chest  Right G/E 3 Views     Status: None    Narrative    EXAM: XR RIBS and CHEST RT 3VW  LOCATION: [facility]  DATE/TIME: 9/19/2022 7:24 PM    INDICATION: fall onto right side  rib pain  COMPARISON: None.      Impression    IMPRESSION: The visualized heart and lungs are negative. No rib fractures.

## 2022-10-05 ENCOUNTER — TELEPHONE (OUTPATIENT)
Dept: AUDIOLOGY | Facility: CLINIC | Age: 82
End: 2022-10-05

## 2022-10-05 ENCOUNTER — NURSE TRIAGE (OUTPATIENT)
Dept: NURSING | Facility: CLINIC | Age: 82
End: 2022-10-05

## 2022-10-05 NOTE — TELEPHONE ENCOUNTER
[facility] Call Center    Phone Message    May a detailed message be left on voicemail: yes     Reason for Call: Other: Per pt feels her hearing has dropped off from 18 percent to almost nothing. Please call to discuss. Thank you     Action Taken: Message routed to:  [facility] AUDIO    Travel Screening: Not Applicable

## 2022-10-05 NOTE — TELEPHONE ENCOUNTER
Nurse Triage SBAR    Is this a 2nd Level Triage?   Yes     Situation:   Increased Hearing loss       Background/Assessment:     Pt reporting, sudden hearing loss in her left ear.   No hearing in the right ear.         Pt went to the Primary Care Provider and received some steroids.    It got better and she could hear a little bit.  But, once the dose was decreased.     The hearing was lost again.      Her Primary Care Provider wanted her to follow with ENT right away.        No pain, or drainage noted.   No dizziness noted.    Has ringging in her ears all the time.    Pt would like to be seen as soon as possible.   Please call the Pt back.    Please call the hearing impaired # [phone] and then her phone number [phone].         It is okay to leave a detailed message with a direct line number to speak to a live person.    Viviana Zhang RN  Central Triage Red Flags/Med Refills        Protocol Recommended Disposition:   See in Office Today      Reason for Disposition    Patient wants to be seen    Additional Information    Negative: Followed an ear injury    Negative: Decreased hearing with nasal allergies    Negative: Part of a cold    Negative: Follows air travel or mountain driving    Negative: Earwax, questions about    Negative: Dizziness is main symptom    Negative: Tinnitus (e.g., ringing, hissing, beating) is main symptom    Negative: Patient sounds very sick or weak to the triager    Negative: Ringing in the ears (tinnitus) and taking aspirin and dosage sounds high (i.e., > 1500 mg/day)    Negative: Hearing loss in one or both ears of sudden onset and present now    Negative: Earache    Negative: Decreased hearing followed sudden, extremely loud noise (e.g., explosion, not just loud concert)    Negative: Decreased hearing and taking medication that can damage hearing (i.e., gentamycin, tobramycin, furosemide, ethacrynic acid, cisplatin, quinidine)    Protocols used: HEARING LOSS OR CHANGE-A-OH

## 2022-10-05 NOTE — TELEPHONE ENCOUNTER
Called pt and she is going to call the nurse triage line to discuss her symptoms. Phone number given to call.    Beti Álvarez LPN

## 2022-10-10 DIAGNOSIS — H91.90 DEAFNESS: Primary | Chronic | ICD-10-CM

## 2022-10-10 DIAGNOSIS — H90.3 SENSORINEURAL HEARING LOSS, BILATERAL: ICD-10-CM

## 2022-10-11 ENCOUNTER — OFFICE VISIT (OUTPATIENT)
Dept: OTOLARYNGOLOGY | Facility: CLINIC | Age: 82
End: 2022-10-11
Payer: MEDICARE

## 2022-10-11 ENCOUNTER — OFFICE VISIT (OUTPATIENT)
Dept: AUDIOLOGY | Facility: CLINIC | Age: 82
End: 2022-10-11
Payer: MEDICARE

## 2022-10-11 DIAGNOSIS — H90.3 BILATERAL SENSORINEURAL HEARING LOSS: Primary | ICD-10-CM

## 2022-10-11 DIAGNOSIS — H90.3 SENSORINEURAL HEARING LOSS, BILATERAL: Primary | ICD-10-CM

## 2022-10-11 DIAGNOSIS — H93.13 TINNITUS, BILATERAL: ICD-10-CM

## 2022-10-11 PROCEDURE — 92557 COMPREHENSIVE HEARING TEST: CPT | Mod: 52 | Performed by: AUDIOLOGIST

## 2022-10-11 PROCEDURE — 99204 OFFICE O/P NEW MOD 45 MIN: CPT | Mod: 25 | Performed by: OTOLARYNGOLOGY

## 2022-10-11 PROCEDURE — 92550 TYMPANOMETRY & REFLEX THRESH: CPT | Performed by: AUDIOLOGIST

## 2022-10-11 PROCEDURE — 92504 EAR MICROSCOPY EXAMINATION: CPT | Performed by: OTOLARYNGOLOGY

## 2022-10-11 NOTE — PROGRESS NOTES
AUDIOLOGY REPORT    SUMMARY: Audiology visit completed. See audiogram for results.      RECOMMENDATIONS: Follow-up with ENT.    Sarah Hart, [facility]  Licensed Audiologist  [place] License #[id number]

## 2022-10-11 NOTE — PATIENT INSTRUCTIONS
1. You were seen in the ENT Clinic today by Dr. Nissen.  If you have any questions or concerns after your appointment, please call   - Option 1: ENT Clinic: [phone]   - Option 2: Beti (Dr. Nissen's Nurse): [phone]                   (Dr. Nissen's Nurse): [phone]      2.   Plan to return to clinic as needed      How to Contact Us:  Send a [employer] message to your provider. Our team will respond to you via [employer]. Occasionally, we will need to call you to get further information.  For urgent matters (Monday-Friday), call the ENT Clinic: [phone] and speak with a call center team member - they will route your call appropriately.   If you'd like to speak directly with a nurse, please find our contact information below. We do our best to check voicemail frequently throughout the day, and will work to call you back within 1-2 days. For urgent matters, please use the general clinic phone numbers listed above.       EDUIN Garcia  [facility] - Otolaryngology

## 2022-10-11 NOTE — LETTER
10/11/2022       RE: Jumana Yang  [address]  [place] [address]     Dear Colleague,    Thank you for referring your patient, Jumana Yang, to the [facility] [place] at [facility]. Please see a copy of my visit note below.    Dear Ananya Em:    I had the pleasure of meeting Jumana Yang in consultation today at the [facility] at your request.    CHIEF COMPLAINT: Hearing loss    HISTORY OF PRESENT ILLNESS: Patient is an 82-year-old in today for consultation on hearing loss referred from her family physician.  She is accompanied today by her son.  She began having hearing issues in the right ear in the mid 1980s.  She was diagnosed with right Ménière's at the [facility] in [place] where she lived.  She eventually lost the hearing in that ear.  MRI scan was negative.  Subsequently she began having a left progressive sensorineural hearing loss.  Is been a question of autoimmune inner ear disease and she has been treated with different medication over the years including methotrexate and steroids on a short-term and long-term basis.  Most recently has been on 10 mg of prednisone daily for years.  Her hearing is very minimal in her left and only hearing ear.  She was seen here in 2016 and we did talk about a cochlear implant at that time.  She is quite adamant she does not want to have a cochlear implant and so we just been monitoring with the oral steroids.  About a month ago she felt the hearing got worse in the left ear.  She has had fluctuating hearing since then.  Her local physician did put her on a prednisone taper.  She is in today for assessment.  She feels the past couple days her hearing the left ear is a back to its previous level.  She again has been around a lot of stress lately, she has spinal stenosis and possible surgery pending on  that.    ALLERGIES:    Allergies   Allergen Reactions     Propofol Nausea and Vomiting     Shellfish Allergy      Other reaction(s): Dizziness     Capsaicin Rash and Blisters       HABITS: Social History    Substance and Sexual Activity      Alcohol use: Not Currently        Alcohol/week: 0.0 standard drinks        Comment: MALISSA white since 9/131986     History   Smoking Status     Former     Types: Cigarettes     Quit date: 11/15/1987   Smokeless Tobacco     Former         PAST MEDICAL HISTORY: Please see today's intake form (for the remainder of the PMH) which I reviewed and signed.  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     Hepatic abscess      Hyperlipidemia      Hypertension      Irritable bowel syndrome      Meniere disease      Neuropathy      Noninfectious ileitis      Peritoneal abscess (H)      Spinal stenosis of lumbar region      Steroid long-term use      Vaginitis, atrophic, postmenopausal      Vitamin D deficiency        FAMILY HISTORY/SOCIAL HISTORY:   Family History   Problem Relation Age of Onset     Cerebrovascular Disease Mother      Heart Disease Mother      Heart Disease Father      Heart Disease Brother      Diabetes No family hx of      Coronary Artery Disease No family hx of      Hypertension No family hx of      Hyperlipidemia No family hx of      Breast Cancer No family hx of      Colon Cancer No family hx of      Prostate Cancer No family hx of      Other Cancer No family hx of      Depression No family hx of      Anxiety Disorder No family hx of      Mental Illness No family hx of      Substance Abuse No family hx  of      Anesthesia Reaction No family hx of      Asthma No family hx of      Osteoporosis No family hx of      Genetic Disorder No family hx of      Thyroid Disease No family hx of      Obesity No family hx of      Unknown/Adopted No family hx of       Social History     Socioeconomic History     Marital status:      Spouse name: Not on file     Number of children: Not on file     Years of education: Not on file     Highest education level: Not on file   Occupational History     Not on file   Tobacco Use     Smoking status: Former     Types: Cigarettes     Quit date: 11/15/1987     Years since quittin.9     Smokeless tobacco: Former   Substance and Sexual Activity     Alcohol use: Not Currently     Alcohol/week: 0.0 standard drinks     Comment: MALISSA white since      Drug use: No     Comment: used marijuanna in the past     Sexual activity: Never     Partners: Male   Other Topics Concern     Parent/sibling w/ CABG, MI or angioplasty before 65F 55M? No   Social History Narrative    ** Merged History Encounter **          Social Determinants of Health     Financial Resource Strain: Not on file   Food Insecurity: Not on file   Transportation Needs: Not on file   Physical Activity: Not on file   Stress: Not on file   Social Connections: Not on file   Intimate Partner Violence: Not on file   Housing Stability: Not on file       REVIEW OF SYSTEMS: Patient Supplied Answers to Review of Systems   ENT ROS 2016   Constitutional: Weight gain, Unexplained fatigue   Psychology: Frequently feeling depressed or sad   Ears, Nose, Throat: Hearing loss, Ringing/noise in ears   Gastrointestinal/Genitourinary: Heartburn/indigestion, Diarrhea   Musculoskeletal: Back pain, Swollen legs/feet   Hematologic: Easy bruising   Endocrine: Thirst, Frequent urination            The remainder of the 10 point ROS is negative    PHYSICIAL EXAMINATION:  Constitutional: The patient was well-groomed and in no acute distress.    Skin: Warm and pink.  Psychiatric: The patient's affect was calm, cooperative, and appropriate.   Respiratory: Breathing comfortably without stridor or exertion of accessory muscles.  Eyes: Pupils were equal and reactive. Extraocular movement intact.   Head: Normocephalic and atraumatic. No lesions or scars.  Ears: Patient placed under the microscope for microscopic evaluation and cleaning of cerumen which was obscuring full visualization and complete assessment of both TMs. Under high power magnification, the right ear was examined and cleaned of cerumen using curet, alligator forceps, and suction.  After cleaning, TM is fully visualized and has normal position with normal middle ear aeration. The left ear was then cleaned and inspected using microscope, instruments and similar techniques. After cleaning of cerumen, the TM has normal position with normal aeration to middle ear.  Nose: Sinuses were nontender. Anterior rhinoscopy revealed midline septum and absence of purulence or polyps.  Oral Cavity: Normal tongue, floor of mouth, buccal mucosa, and palate. No lesions or masses on inspection or palpation. No abnormal lymph tissue in the oropharynx.   Neck: The parotid is soft without masses. Supple with normal laryngeal and tracheal landmarks.   Lymphatic: There is no palpable lymphadenopathy or other masses in the neck.   Neurologic: Alert and oriented x 3. Cranial nerves III-XI within normal limits. Voice quality normal.  Cerebellar Function Tests:  Grossly normal    Audiogram: Audiogram performed shows no hearing in the right ear, she has a profound hearing loss on the left side with 16% discrimination.  This is fairly similar from a test done a year ago as far as discrimination levels.  Pure-tone levels do seem slightly worse.      IMPRESSION AND PLAN:   1. Left sensorineural hearing loss.  Discussed with her and son the profound hearing loss she has in her left and only hearing ear.  Talked them about a  steroid injection which she has never had, they were not aware of that.  She again is not interested in a cochlear implant at all which would certainly be recommendation for her.  Discussed a steroid injection in detail including potential risk of persistent perforation.  May have conductive hearing loss with that if it does occur, again quite uncommon.  In any event she wants to just watch for now as the hearing is back to its previous level she feels.  She again has had fluctuation since the last test.  They would like to come in if her hearing drops again and they would consider injection at that time.  My nurse will talk to her about getting her in should that happen in the near future.  Other than that we will just monitor for now.  2. Bilateral tinnitus: Long-term and stable, no treatment needed, monitor.    Thank you very much for the opportunity to participate in the care of your patient.    Rick L Nissen MD

## 2022-10-11 NOTE — PROGRESS NOTES
Dear Ananya Em:    I had the pleasure of meeting Jumana Yang in consultation today at the [facility] at your request.    CHIEF COMPLAINT: Hearing loss    HISTORY OF PRESENT ILLNESS: Patient is an 82-year-old in today for consultation on hearing loss referred from her family physician.  She is accompanied today by her son.  She began having hearing issues in the right ear in the mid 1980s.  She was diagnosed with right Ménière's at the [facility] in [place] where she lived.  She eventually lost the hearing in that ear.  MRI scan was negative.  Subsequently she began having a left progressive sensorineural hearing loss.  Is been a question of autoimmune inner ear disease and she has been treated with different medication over the years including methotrexate and steroids on a short-term and long-term basis.  Most recently has been on 10 mg of prednisone daily for years.  Her hearing is very minimal in her left and only hearing ear.  She was seen here in 2016 and we did talk about a cochlear implant at that time.  She is quite adamant she does not want to have a cochlear implant and so we just been monitoring with the oral steroids.  About a month ago she felt the hearing got worse in the left ear.  She has had fluctuating hearing since then.  Her local physician did put her on a prednisone taper.  She is in today for assessment.  She feels the past couple days her hearing the left ear is a back to its previous level.  She again has been around a lot of stress lately, she has spinal stenosis and possible surgery pending on that.    ALLERGIES:    Allergies   Allergen Reactions     Propofol Nausea and Vomiting     Shellfish Allergy      Other reaction(s): Dizziness     Capsaicin Rash and Blisters       HABITS: Social History    Substance and Sexual Activity      Alcohol use: Not Currently        Alcohol/week: 0.0 standard drinks        Comment: MALISSA white  since 9/131986     History   Smoking Status     Former     Types: Cigarettes     Quit date: 11/15/1987   Smokeless Tobacco     Former         PAST MEDICAL HISTORY: Please see today's intake form (for the remainder of the PMH) which I reviewed and signed.  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     Hepatic abscess      Hyperlipidemia      Hypertension      Irritable bowel syndrome      Meniere disease      Neuropathy      Noninfectious ileitis      Peritoneal abscess (H)      Spinal stenosis of lumbar region      Steroid long-term use      Vaginitis, atrophic, postmenopausal      Vitamin D deficiency        FAMILY HISTORY/SOCIAL HISTORY:   Family History   Problem Relation Age of Onset     Cerebrovascular Disease Mother      Heart Disease Mother      Heart Disease Father      Heart Disease Brother      Diabetes No family hx of      Coronary Artery Disease No family hx of      Hypertension No family hx of      Hyperlipidemia No family hx of      Breast Cancer No family hx of      Colon Cancer No family hx of      Prostate Cancer No family hx of      Other Cancer No family hx of      Depression No family hx of      Anxiety Disorder No family hx of      Mental Illness No family hx of      Substance Abuse No family hx of      Anesthesia Reaction No family hx of      Asthma No family hx of      Osteoporosis No family hx of      Genetic Disorder No family hx of      Thyroid Disease No family hx of      Obesity No family hx of      Unknown/Adopted No family hx of       Social History     Socioeconomic History     Marital status:      Spouse  name: Not on file     Number of children: Not on file     Years of education: Not on file     Highest education level: Not on file   Occupational History     Not on file   Tobacco Use     Smoking status: Former     Types: Cigarettes     Quit date: 11/15/1987     Years since quittin.9     Smokeless tobacco: Former   Substance and Sexual Activity     Alcohol use: Not Currently     Alcohol/week: 0.0 standard drinks     Comment: MALISSA -dober since      Drug use: No     Comment: used marijuanna in the past     Sexual activity: Never     Partners: Male   Other Topics Concern     Parent/sibling w/ CABG, MI or angioplasty before 65F 55M? No   Social History Narrative    ** Merged History Encounter **          Social Determinants of Health     Financial Resource Strain: Not on file   Food Insecurity: Not on file   Transportation Needs: Not on file   Physical Activity: Not on file   Stress: Not on file   Social Connections: Not on file   Intimate Partner Violence: Not on file   Housing Stability: Not on file       REVIEW OF SYSTEMS: Patient Supplied Answers to Review of Systems   ENT ROS 2016   Constitutional: Weight gain, Unexplained fatigue   Psychology: Frequently feeling depressed or sad   Ears, Nose, Throat: Hearing loss, Ringing/noise in ears   Gastrointestinal/Genitourinary: Heartburn/indigestion, Diarrhea   Musculoskeletal: Back pain, Swollen legs/feet   Hematologic: Easy bruising   Endocrine: Thirst, Frequent urination            The remainder of the 10 point ROS is negative    PHYSICIAL EXAMINATION:  Constitutional: The patient was well-groomed and in no acute distress.   Skin: Warm and pink.  Psychiatric: The patient's affect was calm, cooperative, and appropriate.   Respiratory: Breathing comfortably without stridor or exertion of accessory muscles.  Eyes: Pupils were equal and reactive. Extraocular movement intact.   Head: Normocephalic and atraumatic. No lesions or scars.  Ears: Patient placed  under the microscope for microscopic evaluation and cleaning of cerumen which was obscuring full visualization and complete assessment of both TMs. Under high power magnification, the right ear was examined and cleaned of cerumen using curet, alligator forceps, and suction.  After cleaning, TM is fully visualized and has normal position with normal middle ear aeration. The left ear was then cleaned and inspected using microscope, instruments and similar techniques. After cleaning of cerumen, the TM has normal position with normal aeration to middle ear.  Nose: Sinuses were nontender. Anterior rhinoscopy revealed midline septum and absence of purulence or polyps.  Oral Cavity: Normal tongue, floor of mouth, buccal mucosa, and palate. No lesions or masses on inspection or palpation. No abnormal lymph tissue in the oropharynx.   Neck: The parotid is soft without masses. Supple with normal laryngeal and tracheal landmarks.   Lymphatic: There is no palpable lymphadenopathy or other masses in the neck.   Neurologic: Alert and oriented x 3. Cranial nerves III-XI within normal limits. Voice quality normal.  Cerebellar Function Tests:  Grossly normal    Audiogram: Audiogram performed shows no hearing in the right ear, she has a profound hearing loss on the left side with 16% discrimination.  This is fairly similar from a test done a year ago as far as discrimination levels.  Pure-tone levels do seem slightly worse.      IMPRESSION AND PLAN:   1. Left sensorineural hearing loss.  Discussed with her and son the profound hearing loss she has in her left and only hearing ear.  Talked them about a steroid injection which she has never had, they were not aware of that.  She again is not interested in a cochlear implant at all which would certainly be recommendation for her.  Discussed a steroid injection in detail including potential risk of persistent perforation.  May have conductive hearing loss with that if it does occur,  again quite uncommon.  In any event she wants to just watch for now as the hearing is back to its previous level she feels.  She again has had fluctuation since the last test.  They would like to come in if her hearing drops again and they would consider injection at that time.  My nurse will talk to her about getting her in should that happen in the near future.  Other than that we will just monitor for now.  2. Bilateral tinnitus: Long-term and stable, no treatment needed, monitor.    Thank you very much for the opportunity to participate in the care of your patient.    Rick L Nissen MD

## 2022-10-25 ENCOUNTER — OFFICE VISIT (OUTPATIENT)
Dept: OTHER | Facility: CLINIC | Age: 82
End: 2022-10-25
Attending: PHYSICIAN ASSISTANT
Payer: MEDICARE

## 2022-10-25 VITALS — OXYGEN SATURATION: 96 % | HEART RATE: 75 BPM | DIASTOLIC BLOOD PRESSURE: 69 MMHG | SYSTOLIC BLOOD PRESSURE: 134 MMHG

## 2022-10-25 DIAGNOSIS — I87.2 PERIPHERAL VENOUS INSUFFICIENCY: ICD-10-CM

## 2022-10-25 DIAGNOSIS — R60.9 EDEMA, UNSPECIFIED TYPE: Primary | ICD-10-CM

## 2022-10-25 PROCEDURE — G0463 HOSPITAL OUTPT CLINIC VISIT: HCPCS

## 2022-10-25 PROCEDURE — 99215 OFFICE O/P EST HI 40 MIN: CPT | Performed by: PHYSICIAN ASSISTANT

## 2022-10-25 NOTE — PATIENT INSTRUCTIONS
Get an echocardiogram of your heart.     2.  Get venous competency studies done (an ultrasound of your legs).     3.  If you can't use compression wraps, use ACE wraps from your toes to your knees on both legs during the day and remove them at night.    4. Call your primary care provider and ask again why you are now on thyroid medication as we are unable to view her notes and labs.  Keep taking the medication as directed by her.     5. Make sure you are not taking Amlodipine.     6. Call us with any questions or concerns ([phone]).

## 2022-10-25 NOTE — PROGRESS NOTES
Patient is here to discuss follow up.    /69 (BP Location: Left arm, Patient Position: Chair, Cuff Size: Adult Regular)   Pulse 75   LMP  (LMP Unknown)   SpO2 96%     Questions patient would like addressed today are: Patient would like to talk about an alternative to the compression socks because they're too tight to put on by herself. Also when she did get them on once with her son's help she later had to cut them off because they were digging into her legs and she couldn't take them off any other way.    Refills are needed: N/A    Has homecare services and agency name:  Yes:  PCA comes by Monday and Thursday. Another PCA comes by on Wednesday and Saturday and PT comes by twice a week.      Karthikeyan Hart

## 2022-10-25 NOTE — PROGRESS NOTES
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      In 5/2022 she was seen by Vascular Surgery as CTA of the left lower extremity for colder, discolored foot showed loss of the peroneal artery in the midcalf region with no significant reconstitution. There was loss of the anterior tibial artery in the lower calf region. It was felt that posterior tibial artery extended into the foot although this was difficult to determine given motion. There was soft tissue edema seen in both lower extremities greatest in the left lower extremity. JOAQUIN was 1.0 on left and 0.89 on the right with TBI's adequate for healing. It was felt that her progressive decline in ability to ambulate was not secondary to her very mild PAD.      She has more recently felt like her abdomen is filling up with fluid. She has presented for this to the ED twice with CT with no acute findings other than likely constipation. They did give her some IV lasix last visit and she felt that this did help. She has been on Lasix 40 mg daily. Amlodipine was discontinued. She is on chronic prednisone at 6 mg daily for many years now.      She is unable to get any compression socks on. She tried velcro compression wraps, but just can't get them on/off due to spinal stenosis/inablility to bend down and doesn't want to depend on others. She is always sitting  in her wheelchair with legs down. She is hoping to get something else for her leg swelling.     She states she was recently started on thyroid medication but does no know what medication or why. Notes are not available in our system. She also does not have an updated medication list with and is unable to tell me what medications she is on. Her son cancelled her echocardiogram that had been ordered previously as he thought her heart was fine. She is presently dealing with nerve issues in her upper extremities.           PAST MEDICAL HISTORY  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     Hepatic abscess      Hyperlipidemia      Hypertension      Irritable bowel syndrome      Meniere disease      Neuropathy      Noninfectious ileitis      Peritoneal abscess (H)      Spinal stenosis of lumbar region      Steroid long-term use      Vaginitis, atrophic, postmenopausal      Vitamin D deficiency        CURRENT MEDICATIONS  atorvastatin (LIPITOR) 10 MG tablet, Take 10 mg by mouth At Bedtime  blood glucose (ACCU-CHEK FASTCLIX) lancing device, FOR TESTING ONCE DAILY. DX  E11.9 TYPE 2 DIABETES  blood glucose (CONTOUR TEST) test strip, TESTING EVERY DAY DX  E11.9  calamine 8-8 % lotion, Apply topically every hour as needed for itching  CONTOUR NEXT TEST test strip, TESTING EVERY DAY DX  E11.9  cyanocobalamin 1000 MCG SUBL, Place 1,000 mcg under the tongue daily  DIAZEPAM 5 MG/5ML solution, Take 0.5 mg by mouth 2 times daily as needed for anxiety  DULoxetine (CYMBALTA) 60 MG EC capsule, TAKE 1 CAPSULE (60 MG) BY MOUTH DAILY  JARDIANCE 25 MG TABS tablet, Take 25  mg by mouth daily   lamoTRIgine (LAMICTAL) 100 MG tablet, Take 100 mg by mouth 2 times daily  metFORMIN (GLUCOPHAGE) 500 MG tablet, Take 750 mg by mouth daily (with dinner)  methocarbamol (ROBAXIN) 500 MG tablet, Take 1 tablet (500 mg) by mouth 3 times daily as needed for muscle spasms  oxybutynin ER (DITROPAN XL) 15 MG 24 hr tablet, Take 15 mg by mouth daily  oxyCODONE-acetaminophen (PERCOCET) 5-325 MG tablet, Take 1 tablet by mouth every 6 hours as needed for severe pain (Max 4 tablets daily)  pantoprazole (PROTONIX) 40 MG EC tablet, Take 1 tablet (40 mg) by mouth every morning (before breakfast)  potassium chloride ER (KLOR-CON M) 10 MEQ CR tablet, Take 1 tablet (10 mEq) by mouth daily  predniSONE (DELTASONE) 1 MG tablet, Take 6 mg by mouth daily   vitamin D3 (CHOLECALCIFEROL) 50 mcg (2000 units) tablet, Take 3 tablets by mouth daily  ferrous sulfate (FEROSUL) 325 (65 Fe) MG tablet, Take 1 tablet (325 mg) by mouth daily (Patient not taking: Reported on 10/25/2022)  furosemide (LASIX) 40 MG tablet, Take 1 tablet (40 mg) by mouth daily  glipiZIDE (GLUCOTROL) 10 MG tablet, Take 1 tablet (10 mg) by mouth 2 times daily (before meals)  hydrOXYzine (ATARAX) 25 MG tablet, Take 25 mg by mouth 3 times daily as needed for anxiety  lidocaine (LIDODERM) 5 % patch, Place 2 patches onto the skin every 24 hours To prevent lidocaine toxicity, patient should be patch free for 12 hrs daily. (Patient not taking: Reported on 10/25/2022)  melatonin 1 MG TABS tablet, Take 1 tablet (1 mg) by mouth nightly as needed for sleep  miconazole (MICATIN) 2 % external powder, Apply topically 2 times daily as needed To the skin fold (Patient not taking: Reported on 10/25/2022)  order for DME, Equipment being ordered: wrist brace (Patient not taking: Reported on 10/25/2022)  polyethylene glycol (MIRALAX) 17 GM/Dose powder, Take 17 g by mouth daily (Patient not taking: Reported on 10/25/2022)  pregabalin (LYRICA) 25 MG capsule, Take 1 capsule (25  mg) by mouth daily  pregabalin (LYRICA) 50 MG capsule, Take 1 capsule (50 mg) by mouth At Bedtime  senna-docusate (SENOKOT-S/PERICOLACE) 8.6-50 MG tablet, Take 1 tablet by mouth 2 times daily (Patient not taking: Reported on 10/25/2022)  triamcinolone (KENALOG) 0.1 % external cream, Apply topically 2 times daily as needed  (Patient not taking: Reported on 10/25/2022)  [DISCONTINUED] amLODIPine (NORVASC) 10 MG tablet, Take 1 tablet (10 mg) by mouth daily  [DISCONTINUED] gabapentin (NEURONTIN) 100 MG capsule, Take 200 mg by mouth 2 times daily    No current facility-administered medications on file prior to visit.      PAST SURGICAL HISTORY:  Past Surgical History:   Procedure Laterality Date     CATARACT IOL, RT/LT Left 7/2013     FOOT SURGERY      toe     GI SURGERY       IR LUMBAR/SACRAL TRANSFOR INJ BILATERAL Bilateral 3/11/2022     LAPAROSCOPIC HEPATECTOMY PARTIAL  11/4/2013    Procedure: LAPAROSCOPIC HEPATECTOMY PARTIAL;  Laparoscopic Debridement of Liver Abcess;  Surgeon: Sepideh Green MD;  Location: [facility] OR     ORTHOPEDIC SURGERY       PICC INSERTION  8/28/2013    5fr DL Power PICC, 41cm (1cm external length) in the R lateral brachial vein with tip in the SVC RA junction.     RECTAL SURGERY      1970s     VASCULAR SURGERY         ALLERGIES     Allergies   Allergen Reactions     Propofol Nausea and Vomiting     Shellfish Allergy      Other reaction(s): Dizziness     Capsaicin Rash and Blisters       FAMILY HISTORY  Family History   Problem Relation Age of Onset     Cerebrovascular Disease Mother      Heart Disease Mother      Heart Disease Father      Heart Disease Brother      Diabetes No family hx of      Coronary Artery Disease No family hx of      Hypertension No family hx of      Hyperlipidemia No family hx of      Breast Cancer No family hx of      Colon Cancer No family hx of      Prostate Cancer No family hx of      Other Cancer No family hx of      Depression No family hx of      Anxiety  Disorder No family hx of      Mental Illness No family hx of      Substance Abuse No family hx of      Anesthesia Reaction No family hx of      Asthma No family hx of      Osteoporosis No family hx of      Genetic Disorder No family hx of      Thyroid Disease No family hx of      Obesity No family hx of      Unknown/Adopted No family hx of        SOCIAL HISTORY  Social History     Socioeconomic History     Marital status:      Spouse name: Not on file     Number of children: Not on file     Years of education: Not on file     Highest education level: Not on file   Occupational History     Not on file   Tobacco Use     Smoking status: Former     Types: Cigarettes     Quit date: 11/15/1987     Years since quittin.9     Smokeless tobacco: Former   Substance and Sexual Activity     Alcohol use: Not Currently     Alcohol/week: 0.0 standard drinks     Comment: AA -dober since      Drug use: No     Comment: used marijuanna in the past     Sexual activity: Never     Partners: Male   Other Topics Concern     Parent/sibling w/ CABG, MI or angioplasty before 65F 55M? No       ROS:   General: No change in weight, sleep or appetite.  Normal energy.  No fever or chills  Eyes: Negative for vision changes or eye problems  ENT: No problems with nose or throat.  No difficulty swallowing. Deaf right ear, minimal hearing in left ear.   Resp: No coughing, wheezing or shortness of breath  CV: No chest pains or palpitations  GI: No nausea, vomiting,  heartburn, abdominal pain, diarrhea, constipation or change in bowel habits. Has abdominal fullness.   : No urinary frequency or dysuria, bladder or kidney problems  Musculoskeletal: No significant muscle or joint pains. Bilateral lower extremity edema.   Neurologic: No headaches. Nerve problems upper extremities. Poor balance with recent fall.   Psychiatric: No problems with anxiety, depression or mental health  Heme/immune/allergy: No history of bleeding or clotting  problems or anemia.  No allergies or immune system problems  Endocrine: + DM, recently started on thyroid medication.   Skin: No rashes,worrisome lesions or skin problems  Vascular:  No claudication, lifestyle limiting or otherwise; no ischemic rest pain; no non-healing ulcers. No weakness, No loss of sensation        EXAM:  /69 (BP Location: Left arm, Patient Position: Chair, Cuff Size: Adult Regular)   Pulse 75   LMP  (LMP Unknown)   SpO2 96%   In general, the patient is a pleasant female in no apparent distress.    HEENT: NC/AT.  PERRLA.  EOMI.  Sclerae white, not injected. Can't hear out of right ear. Left ear with a very small amount of hearing so uses a .   Neck:  Carotids +2/2 bilaterally without bruits.   Heart: RRR. Normal S1, S2 splits physiologically. No murmur, rub, click, or gallop.   Lungs: CTA.  No ronchi, wheezes, rales.    Abdomen: Soft, nontender, nondistended.   Extremities: No clubbing, cyanosis. No wounds. Bilateral lower extremity edema. Spider veins in both ankles and feet.       Labs:  LIVER ENZYME RESULTS:  Lab Results   Component Value Date    AST 11 07/03/2022    AST 25 02/14/2017    ALT 15 07/03/2022    ALT 26 02/14/2017       CBC RESULTS:  Lab Results   Component Value Date    WBC 16.2 (H) 07/03/2022    WBC 8.9 04/12/2016    RBC 4.67 07/03/2022    RBC 3.99 04/12/2016    HGB 10.8 (L) 07/03/2022    HGB 12.2 10/24/2017    HCT 37.9 07/03/2022    HCT 36.0 04/12/2016    MCV 81 07/03/2022    MCV 90 04/12/2016    MCH 23.1 (L) 07/03/2022    MCH 26.1 (L) 04/12/2016    MCHC 28.5 (L) 07/03/2022    MCHC 28.9 (L) 04/12/2016    RDW 16.2 (H) 07/03/2022    RDW 15.8 (H) 04/12/2016     07/03/2022     04/12/2016       BMP RESULTS:  Lab Results   Component Value Date     07/03/2022     10/24/2017    POTASSIUM 2.9 (L) 07/03/2022    POTASSIUM 3.9 10/24/2017    CHLORIDE 98 07/03/2022    CHLORIDE 101 10/24/2017    CO2 28 07/03/2022    CO2 28 10/24/2017    ANIONGAP 17  07/03/2022    ANIONGAP 10 10/24/2017     (H) 07/03/2022     (H) 10/24/2017    BUN 26 07/03/2022    BUN 18 10/24/2017    CR 0.95 07/03/2022    CR 0.84 10/24/2017    GFRESTIMATED 61 07/03/2022    GFRESTIMATED >60 12/29/2020    GFRESTIMATED 66 10/24/2017    GFRESTBLACK >60 12/29/2020    GFRESTBLACK 80 10/24/2017    MACY 10.8 (H) 07/03/2022    MACY 9.5 10/24/2017        A1C RESULTS:  Lab Results   Component Value Date    A1C 8.8 (H) 03/08/2022    A1C 6.8 (H) 10/24/2017       THYROID RESULTS:  Lab Results   Component Value Date    TSH 0.68 08/15/2022    TSH 1.86 09/06/2016         Procedures:   [facility]  CT OF THE CHEST, ABDOMEN, PELVIS, AND BILATERAL LOWER EXTREMITIES USING CTA TECHNIQUE WITH RUNOFF.  5/26/2022 10:00 PM     INDICATION: cold, pale bilateral feet, dimished pulse in right foot. no pulse in left foot  TECHNIQUE: Noncontrast. Axial, sagittal and coronal thin-section reconstruction. Dose reduction techniques were used.  COMPARISON: None.     FINDINGS: CTA examination of the chest demonstrates mild calcification of the thoracic aorta with no evidence for significant stenosis, aneurysmal dilatation, dissection, or intraluminal clot.     CTA examination of the abdomen demonstrates mild vascular calcification involving the abdominal aorta with no evidence for significant stenosis, aneurysmal dilatation, dissection, or intraluminal clot identified.     CTA examination of the pelvis demonstrates mild vascular calcification with no evidence for significant stenosis, aneurysmal dilatation, dissection, or intraluminal clot. There is good flow extending into both groins.     CTA examination of the right lower extremity shows the catheter vessels to be small in size, but are patent with three-vessel flow to the ankle and two-vessel flow into the foot via the tibial arteries.     CTA of the left lower extremity shows loss of the peroneal artery in the midcalf region with no  significant reconstitution. There is loss of the anterior tibial artery in the lower calf region. I believe the posterior tibial artery extend into the foot   although this is difficult to determine given motion.     CT of the chest demonstrates mild to moderate calcification most prominent in the LAD. There is slight dependent atelectasis seen in both lower lobes. There are minimal findings of centrilobular emphysema seen in both lungs.     CT examination of the abdomen demonstrates fatty infiltration of liver. The gallbladder and bile ducts are normal in caliber. The pancreas is normal. The spleen is normal. The adrenals are normal. There are scattered benign simple cysts seen in both   kidneys with no follow-up recommended. The bowel demonstrates mild findings of obstipation in the colon. There is localized narrowing seen involving the hepatic flexure of the colon this likely represents an area of spasm since there is no evidence for   stranding of the adjacent fat to suggest colitis. There are moderate findings of diverticulosis with no evidence for diverticulitis. The pelvic organs demonstrates a calcification in the atrophic uterus most typical for some fibroids. The pelvic organs   are otherwise normal. The musculoskeletal region demonstrates a small fatty umbilical hernia. There are some scattered injection granulomas in the buttocks. Significant degenerative changes are seen throughout the spine to include degenerative disc   disease at the L4 and L5 levels.                                                                      IMPRESSION:     1. There is some loss of detail given patient motion especially in the lower extremities.     2. CTA of the left lower extremity shows loss of the peroneal artery in the midcalf region with no significant reconstitution. There is loss of the anterior tibial artery in the lower calf region. I believe the posterior tibial artery extend into the   foot although this is  difficult to determine given motion. There is soft tissue edema seen in both lower extremities greatest in the left lower extremity.     3.There is localized narrowing seen involving the hepatic flexure of the colon this likely represents an area of spasm since there is no evidence for stranding of the adjacent fat to suggest colitis.         EXAM: RESTING ANKLE-BRACHIAL INDICES (ABIs)  LOCATION: [facility]   DATE/TIME: 5/27/2022 7:12 AM     INDICATION: PAD.  COMPARISON: None.     JOAQUIN FINDINGS:  RIGHT  Brachial: 176  Ankle (PT): 157 Index: 0.87  Ankle (DP): 161 Index: 0.89  Digit: 73 Index: 0.41     LEFT  Brachial: 180  Ankle (PT): 172 Index: 0.96  Ankle (DP): 186 Index: 1.0  Digit: 46 Index: 0.26     The right JOAQUIN at rest is 0.89. The left JOAQUIN at rest is 1.0.                                                                        IMPRESSION:  1.  RIGHT LOWER EXTREMITY: JOAQUIN at rest is borderline abnormal. Right digital index is decreased however digital pressures are adequate for healing potential.  2.  LEFT LOWER EXTREMITY: JOAQUIN at rest is normal. Left digital index is decreased however digital pressures are adequate for healing potential.       Assessment and Plan:   Bilateral lower extremity edema     -This is likely secondary to chronic venous insufficieny given obesity, presence of spider veins on exam.   -Unable to get strong enough compression socks on and has now failed velcro wraps as she can't get them on or off by herself.    -Swelling exacerbated by legs always in dependant position while sitting in wheelchair all day.      Recommendations:   -Advised that she use an ACE wrap on both legs from foot to knee as PT or an aid can help her in the morning and she could easily remove this at night by herself.    -Will undertake venous competency testing.   -Will obtain an echocardiogram to rule out any cardiac causes of her edema. She was supposed to get this in September, but this was  accidentally canceled due to a misunderstanding.   -She should elevate her legs when sitting around.   -She is to make sure she is not taking Amlodipine. She is not sure what medications are in her pill boxes and states lately things have been getting messed up. The chart here states amlodipine was stopped about 6 months ago, but she is to verify she is not taking it. She also was advised to bring an updated medication form to her next visit.   -SHe is to follow-up when imaging is complete.        Caitlin Osullivan PA-C      60 minutes spent on the date of the encounter doing chart review, history and exam, documentation, and further activities as noted above, all in the presence of a home health aid and  for her hearing disability.

## 2022-10-27 ENCOUNTER — TELEPHONE (OUTPATIENT)
Dept: OTHER | Facility: CLINIC | Age: 82
End: 2022-10-27

## 2022-10-27 NOTE — TELEPHONE ENCOUNTER
OV notes faxed to number provided    Mecca LION, RN    [facility]  Office: [phone]  Fax: [phone]

## 2022-10-27 NOTE — TELEPHONE ENCOUNTER
[facility] VASCULAR [facility]ENTER    Who is the name of the provider?:  Caitlin Osullivan    What is the location you see this provider at/preferred location?: [place]  Person calling: Ananya Goodson office (PCP)  Phone number:  [phone]  Nurse call back needed:  NO     Reason for call:   Please fax visit notes from 10/25 OV with Caitlin to patients PCP clinic: [phone]    Pharmacy location:  n/a  Outside Imaging: Not Applicable   Can we leave a detailed message on this number?  YES

## 2022-11-03 ENCOUNTER — HOSPITAL ENCOUNTER (OUTPATIENT)
Dept: CARDIOLOGY | Facility: CLINIC | Age: 82
Discharge: HOME | End: 2022-11-03
Attending: PHYSICIAN ASSISTANT | Admitting: PHYSICIAN ASSISTANT
Payer: MEDICARE

## 2022-11-03 DIAGNOSIS — R60.9 EDEMA, UNSPECIFIED TYPE: Primary | ICD-10-CM

## 2022-11-03 LAB — LVEF ECHO: NORMAL

## 2022-11-03 PROCEDURE — 999N000208 ECHOCARDIOGRAM COMPLETE

## 2022-11-03 PROCEDURE — 255N000002 HC RX 255 OP 636: Performed by: PHYSICIAN ASSISTANT

## 2022-11-03 PROCEDURE — 93306 TTE W/DOPPLER COMPLETE: CPT | Mod: 26 | Performed by: INTERNAL MEDICINE

## 2022-11-03 RX ADMIN — HUMAN ALBUMIN MICROSPHERES AND PERFLUTREN 9 ML: 10; .22 INJECTION, SOLUTION INTRAVENOUS at 16:13

## 2022-11-08 ENCOUNTER — ANCILLARY PROCEDURE (OUTPATIENT)
Dept: ULTRASOUND IMAGING | Facility: CLINIC | Age: 82
End: 2022-11-08
Attending: PHYSICIAN ASSISTANT
Payer: MEDICARE

## 2022-11-08 DIAGNOSIS — I87.2 PERIPHERAL VENOUS INSUFFICIENCY: ICD-10-CM

## 2022-11-08 DIAGNOSIS — R60.9 EDEMA, UNSPECIFIED TYPE: ICD-10-CM

## 2022-11-08 PROCEDURE — 93970 EXTREMITY STUDY: CPT | Performed by: SURGERY

## 2022-11-15 ENCOUNTER — OFFICE VISIT (OUTPATIENT)
Dept: OTHER | Facility: CLINIC | Age: 82
End: 2022-11-15
Attending: PHYSICIAN ASSISTANT
Payer: MEDICARE

## 2022-11-15 VITALS — OXYGEN SATURATION: 93 % | HEART RATE: 88 BPM | DIASTOLIC BLOOD PRESSURE: 55 MMHG | SYSTOLIC BLOOD PRESSURE: 128 MMHG

## 2022-11-15 DIAGNOSIS — I89.0 LYMPHEDEMA: Primary | ICD-10-CM

## 2022-11-15 DIAGNOSIS — R60.9 EDEMA, UNSPECIFIED TYPE: ICD-10-CM

## 2022-11-15 PROCEDURE — 99215 OFFICE O/P EST HI 40 MIN: CPT | Performed by: PHYSICIAN ASSISTANT

## 2022-11-15 PROCEDURE — G0463 HOSPITAL OUTPT CLINIC VISIT: HCPCS

## 2022-11-15 NOTE — PROGRESS NOTES
Patient is here to discuss follow up.    /55 (BP Location: Left arm, Patient Position: Chair, Cuff Size: Adult Regular)   Pulse 88   LMP  (LMP Unknown)   SpO2 93%     Questions patient would like addressed today are: N/A.    Refills are needed: N/A    Has homecare services and agency name:  Yes: [employer] come for 2 hours twice a week. Someone from [employer] comes on Wednesdays and Saturdays.     Karthikeyan Hart

## 2022-11-15 NOTE — PROGRESS NOTES
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      In 5/2022 she was seen by Vascular Surgery as CTA of the left lower extremity for colder, discolored foot showed loss of the peroneal artery in the midcalf region with no significant reconstitution. There was loss of the anterior tibial artery in the lower calf region. It was felt that posterior tibial artery extended into the foot although this was difficult to determine given motion. There was soft tissue edema seen in both lower extremities greatest in the left lower extremity. JOAQUIN was 1.0 on left and 0.89 on the right with TBI's adequate for healing. It was felt that her progressive decline in ability to ambulate was not secondary to her very mild PAD.      She has more recently felt like her abdomen is filling up with fluid. She has presented for this to the ED twice with CT with no acute findings other than likely constipation. They did give her some IV lasix last visit and she felt that this did help. She has been on Lasix 40 mg daily, although she states she is not taking this anymore. Amlodipine was discontinued. She is on chronic prednisone at 6 mg daily for many years now.      She is unable to get any compression socks on. She tried velcro compression wraps, but just can't get them on/off due to spinal stenosis/inablility to bend down and  doesn't want to depend on others. Advised that she try ACE wraps, but stated these didn't work either. She is always sitting in her wheelchair with legs down. She is hoping to get something else for her leg swelling.      She states she was recently started on thyroid medication but does no know what medication or why. Notes are not available in our system. She also does not have an updated medication list with and is unable to tell me what medications she is on. She states that there have been new nurses who fill her pill packs and some days there are more pills than others and she is not sure what they give her. Her son cancelled her echocardiogram that had been ordered previously as he thought her heart was fine. This was rescheduled as well as venous competency studies to rule out venous insufficiency as an etiology of her edema. She presents now to discuss these results.           PAST MEDICAL HISTORY  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     Hepatic abscess      Hyperlipidemia      Hypertension      Irritable bowel syndrome      Meniere disease      Neuropathy      Noninfectious ileitis      Peritoneal abscess (H)      Spinal stenosis of lumbar region      Steroid long-term use      Vaginitis, atrophic, postmenopausal      Vitamin D deficiency        CURRENT MEDICATIONS  atorvastatin (LIPITOR) 10 MG tablet, Take 10 mg by mouth At Bedtime  blood glucose (ACCU-CHEK FASTCLIX) lancing device, FOR TESTING ONCE DAILY. DX  E11.9 TYPE 2 DIABETES  blood glucose (CONTOUR TEST) test strip, TESTING EVERY DAY DX  E11.9  calamine 8-8 % lotion, Apply  topically every hour as needed for itching  CONTOUR NEXT TEST test strip, TESTING EVERY DAY DX  E11.9  cyanocobalamin 1000 MCG SUBL, Place 1,000 mcg under the tongue daily  DIAZEPAM 5 MG/5ML solution, Take 0.5 mg by mouth 2 times daily as needed for anxiety  DULoxetine (CYMBALTA) 60 MG EC capsule, TAKE 1 CAPSULE (60 MG) BY MOUTH DAILY  ferrous sulfate (FEROSUL) 325 (65 Fe) MG tablet, Take 1 tablet (325 mg) by mouth daily (Patient not taking: Reported on 10/25/2022)  furosemide (LASIX) 40 MG tablet, Take 1 tablet (40 mg) by mouth daily (Patient not taking: Reported on 11/15/2022)  glipiZIDE (GLUCOTROL) 10 MG tablet, Take 1 tablet (10 mg) by mouth 2 times daily (before meals)  hydrOXYzine (ATARAX) 25 MG tablet, Take 25 mg by mouth 3 times daily as needed for anxiety  JARDIANCE 25 MG TABS tablet, Take 25 mg by mouth daily   lamoTRIgine (LAMICTAL) 100 MG tablet, Take 100 mg by mouth 2 times daily  lidocaine (LIDODERM) 5 % patch, Place 2 patches onto the skin every 24 hours To prevent lidocaine toxicity, patient should be patch free for 12 hrs daily. (Patient not taking: Reported on 10/25/2022)  melatonin 1 MG TABS tablet, Take 1 tablet (1 mg) by mouth nightly as needed for sleep  metFORMIN (GLUCOPHAGE) 500 MG tablet, Take 750 mg by mouth daily (with dinner)  methocarbamol (ROBAXIN) 500 MG tablet, Take 1 tablet (500 mg) by mouth 3 times daily as needed for muscle spasms  miconazole (MICATIN) 2 % external powder, Apply topically 2 times daily as needed To the skin fold (Patient not taking: Reported on 10/25/2022)  order for DME, Equipment being ordered: wrist brace (Patient not taking: Reported on 10/25/2022)  oxybutynin ER (DITROPAN XL) 15 MG 24 hr tablet, Take 15 mg by mouth daily  oxyCODONE-acetaminophen (PERCOCET) 5-325 MG tablet, Take 1 tablet by mouth every 6 hours as needed for severe pain (Max 4 tablets daily)  pantoprazole (PROTONIX) 40 MG EC tablet, Take 1 tablet (40 mg) by mouth every morning (before  breakfast)  polyethylene glycol (MIRALAX) 17 GM/Dose powder, Take 17 g by mouth daily (Patient not taking: Reported on 10/25/2022)  potassium chloride ER (KLOR-CON M) 10 MEQ CR tablet, Take 1 tablet (10 mEq) by mouth daily  predniSONE (DELTASONE) 1 MG tablet, Take 6 mg by mouth daily   pregabalin (LYRICA) 25 MG capsule, Take 1 capsule (25 mg) by mouth daily  pregabalin (LYRICA) 50 MG capsule, Take 1 capsule (50 mg) by mouth At Bedtime  senna-docusate (SENOKOT-S/PERICOLACE) 8.6-50 MG tablet, Take 1 tablet by mouth 2 times daily (Patient not taking: Reported on 10/25/2022)  triamcinolone (KENALOG) 0.1 % external cream, Apply topically 2 times daily as needed  (Patient not taking: Reported on 10/25/2022)  vitamin D3 (CHOLECALCIFEROL) 50 mcg (2000 units) tablet, Take 3 tablets by mouth daily  [DISCONTINUED] amLODIPine (NORVASC) 10 MG tablet, Take 1 tablet (10 mg) by mouth daily  [DISCONTINUED] gabapentin (NEURONTIN) 100 MG capsule, Take 200 mg by mouth 2 times daily    No current facility-administered medications on file prior to visit.      PAST SURGICAL HISTORY:  Past Surgical History:   Procedure Laterality Date     CATARACT IOL, RT/LT Left 7/2013     FOOT SURGERY      toe     GI SURGERY       IR LUMBAR/SACRAL TRANSFOR INJ BILATERAL Bilateral 3/11/2022     LAPAROSCOPIC HEPATECTOMY PARTIAL  11/4/2013    Procedure: LAPAROSCOPIC HEPATECTOMY PARTIAL;  Laparoscopic Debridement of Liver Abcess;  Surgeon: Sepideh Green MD;  Location: [facility] OR     ORTHOPEDIC SURGERY       PICC INSERTION  8/28/2013    5fr DL Power PICC, 41cm (1cm external length) in the R lateral brachial vein with tip in the SVC RA junction.     RECTAL SURGERY      1970s     VASCULAR SURGERY         ALLERGIES     Allergies   Allergen Reactions     Propofol Nausea and Vomiting     Shellfish Allergy      Other reaction(s): Dizziness     Capsaicin Rash and Blisters       FAMILY HISTORY  Family History   Problem Relation Age of Onset      Cerebrovascular Disease Mother      Heart Disease Mother      Heart Disease Father      Heart Disease Brother      Diabetes No family hx of      Coronary Artery Disease No family hx of      Hypertension No family hx of      Hyperlipidemia No family hx of      Breast Cancer No family hx of      Colon Cancer No family hx of      Prostate Cancer No family hx of      Other Cancer No family hx of      Depression No family hx of      Anxiety Disorder No family hx of      Mental Illness No family hx of      Substance Abuse No family hx of      Anesthesia Reaction No family hx of      Asthma No family hx of      Osteoporosis No family hx of      Genetic Disorder No family hx of      Thyroid Disease No family hx of      Obesity No family hx of      Unknown/Adopted No family hx of        SOCIAL HISTORY  Social History     Socioeconomic History     Marital status:      Spouse name: Not on file     Number of children: Not on file     Years of education: Not on file     Highest education level: Not on file   Occupational History     Not on file   Tobacco Use     Smoking status: Former     Types: Cigarettes     Quit date: 11/15/1987     Years since quittin.9     Smokeless tobacco: Former   Substance and Sexual Activity     Alcohol use: Not Currently     Alcohol/week: 0.0 standard drinks     Comment: AA -dober since      Drug use: No     Comment: used marijuanna in the past     Sexual activity: Never     Partners: Male   Other Topics Concern     Parent/sibling w/ CABG, MI or angioplasty before 65F 55M? No       ROS:   General: No change in weight, sleep or appetite.  Normal energy.  No fever or chills  Eyes: Negative for vision changes or eye problems  ENT: No problems with nose or throat.  No difficulty swallowing. Deaf right ear, minimal hearing in left ear.   Resp: No coughing, wheezing or shortness of breath  CV: No chest pains or palpitations  GI: No nausea, vomiting,  heartburn, abdominal pain, diarrhea,  constipation or change in bowel habits. Has abdominal fullness.   : No urinary frequency or dysuria, bladder or kidney problems  Musculoskeletal: No significant muscle or joint pains. Bilateral lower extremity edema.   Neurologic: No headaches. Nerve problems upper extremities. Poor balance with recent fall.   Psychiatric: No problems with anxiety, depression or mental health  Heme/immune/allergy: No history of bleeding or clotting problems or anemia.  No allergies or immune system problems  Endocrine: + DM, recently started on thyroid medication.   Skin: No rashes,worrisome lesions or skin problems  Vascular:  No claudication, lifestyle limiting or otherwise; no ischemic rest pain; no non-healing ulcers. No weakness, No loss of sensation        EXAM:  /55 (BP Location: Left arm, Patient Position: Chair, Cuff Size: Adult Regular)   Pulse 88   LMP  (LMP Unknown)   SpO2 93%   In general, the patient is a pleasant female in no apparent distress.    HEENT: NC/AT.  PERRLA.  EOMI.  Sclerae white, not injected. Can't hear out of right ear. Left ear with a very small amount of hearing so uses a .   Heart: RRR. Normal S1, S2 splits physiologically. No murmur, rub, click, or gallop.   Lungs: CTA.  No ronchi, wheezes, rales.    Abdomen: Soft, nontender, nondistended.   Extremities: No clubbing, cyanosis. No wounds. Bilateral lower extremity edema (L>R). + Stemmer sign on left, slightly on right. Spider veins in both ankles and feet.       Labs:  LIVER ENZYME RESULTS:  Lab Results   Component Value Date    AST 11 07/03/2022    AST 25 02/14/2017    ALT 15 07/03/2022    ALT 26 02/14/2017       CBC RESULTS:  Lab Results   Component Value Date    WBC 16.2 (H) 07/03/2022    WBC 8.9 04/12/2016    RBC 4.67 07/03/2022    RBC 3.99 04/12/2016    HGB 10.8 (L) 07/03/2022    HGB 12.2 10/24/2017    HCT 37.9 07/03/2022    HCT 36.0 04/12/2016    MCV 81 07/03/2022    MCV 90 04/12/2016    MCH 23.1 (L) 07/03/2022    MCH 26.1  (L) 04/12/2016    MCHC 28.5 (L) 07/03/2022    MCHC 28.9 (L) 04/12/2016    RDW 16.2 (H) 07/03/2022    RDW 15.8 (H) 04/12/2016     07/03/2022     04/12/2016       BMP RESULTS:  Lab Results   Component Value Date     07/03/2022     10/24/2017    POTASSIUM 2.9 (L) 07/03/2022    POTASSIUM 3.9 10/24/2017    CHLORIDE 98 07/03/2022    CHLORIDE 101 10/24/2017    CO2 28 07/03/2022    CO2 28 10/24/2017    ANIONGAP 17 07/03/2022    ANIONGAP 10 10/24/2017     (H) 07/03/2022     (H) 10/24/2017    BUN 26 07/03/2022    BUN 18 10/24/2017    CR 0.95 07/03/2022    CR 0.84 10/24/2017    GFRESTIMATED 61 07/03/2022    GFRESTIMATED >60 12/29/2020    GFRESTIMATED 66 10/24/2017    GFRESTBLACK >60 12/29/2020    GFRESTBLACK 80 10/24/2017    MACY 10.8 (H) 07/03/2022    MACY 9.5 10/24/2017        A1C RESULTS:  Lab Results   Component Value Date    A1C 8.8 (H) 03/08/2022    A1C 6.8 (H) 10/24/2017       THYROID RESULTS:  Lab Results   Component Value Date    TSH 0.68 08/15/2022    TSH 1.86 09/06/2016         Procedures:   EXAM TYPE  BILATERAL LOWER EXTREMITY VENOUS DUPLEX FOR VENOUS INSUFFICIENCY  TECHNICAL SUMMARY 11/8/22     A duplex ultrasound study using color flow was performed to evaluate the bilateral lower extremity veins for valvular incompetence with the patient in a steep reversed trendelenberg.      RIGHT:     The deep veins demonstrate phasic flow, compress, and respond to augmentations.  There is no reflux or DVT.      The GSV demonstrates phasic flow, compresses, and responds to augmentations from the saphenofemoral junction to the ankle with no evidence of reflux or thrombus. The greater saphenous vein measures 8.0 mm at the saphenofemoral junction, 5.9 mm in the proximal thigh, and 3.0 mm at the knee.     The AASV is not visualized.      The Giacomini vein is competent ( 2.4 mm) communicating with the small saphenous vein at the knee level.      The SSV demonstrates phasic flow, compresses,  and responds to augmentations from the popliteal space to the ankle.  No reflux or thrombus is seen.  The saphenopopliteal junction is absent.      Perforators: There is no evidence of incompetent  veins at any level.      LEFT:     The deep veins demonstrate phasic flow, compress, and respond to augmentations.  There is no reflux or DVT.       The GSV demonstrates phasic flow, compresses, and responds to augmentations from the saphenofemoral junction to the ankle with no evidence of reflux or thrombus. The greater saphenous vein measures 6.8 mm at the saphenofemoral junction, 6.5 mm in the proximal thigh, and 4.5 mm at the knee.      The AASV is competent ( 3.2 mm) draining into the saphenofemoral junction.     The Giacomini vein is competent ( 0.9 mm) communicating with the small saphenous vein at the knee level.      The SSV demonstrates phasic flow, compresses, and responds to augmentations from the popliteal space to the ankle.  No reflux or thrombus is seen. The saphenopopliteal junction is absent.      Perforators: There is no evidence of incompetent  veins at any level.      FINAL SUMMARY:  1.  No evidence of deep vein thrombosis in either lower extremity  2.  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  No evidence of superficial venous or  vein insufficiency in either lower extremity       Procedure  Complete Echo Adult. Optison ([facility]C #[id number]) given intravenously 11/3/22     ______________________________________________________________________________  Interpretation Summary     1. Normal biventricular size and function. Left ventricular ejection fraction  of 60-65%.  2. No segmental wall motion abnormalities noted.  3. No hemodynamically significant valvular disease.  No prior study. Technically difficult study.  ______________________________________________________________________________  Left Ventricle  The left ventricle is normal in size. There is normal left  ventricular wall  thickness. Left ventricular systolic function is normal. The visual ejection  fraction is 60-65%. Grade I or early diastolic dysfunction. No regional wall  motion abnormalities noted.      [facility]  CT OF THE CHEST, ABDOMEN, PELVIS, AND BILATERAL LOWER EXTREMITIES USING CTA TECHNIQUE WITH RUNOFF.  5/26/2022 10:00 PM     INDICATION: cold, pale bilateral feet, dimished pulse in right foot. no pulse in left foot  TECHNIQUE: Noncontrast. Axial, sagittal and coronal thin-section reconstruction. Dose reduction techniques were used.  COMPARISON: None.     FINDINGS: CTA examination of the chest demonstrates mild calcification of the thoracic aorta with no evidence for significant stenosis, aneurysmal dilatation, dissection, or intraluminal clot.     CTA examination of the abdomen demonstrates mild vascular calcification involving the abdominal aorta with no evidence for significant stenosis, aneurysmal dilatation, dissection, or intraluminal clot identified.     CTA examination of the pelvis demonstrates mild vascular calcification with no evidence for significant stenosis, aneurysmal dilatation, dissection, or intraluminal clot. There is good flow extending into both groins.     CTA examination of the right lower extremity shows the catheter vessels to be small in size, but are patent with three-vessel flow to the ankle and two-vessel flow into the foot via the tibial arteries.     CTA of the left lower extremity shows loss of the peroneal artery in the midcalf region with no significant reconstitution. There is loss of the anterior tibial artery in the lower calf region. I believe the posterior tibial artery extend into the foot   although this is difficult to determine given motion.     CT of the chest demonstrates mild to moderate calcification most prominent in the LAD. There is slight dependent atelectasis seen in both lower lobes. There are minimal findings of  centrilobular emphysema seen in both lungs.     CT examination of the abdomen demonstrates fatty infiltration of liver. The gallbladder and bile ducts are normal in caliber. The pancreas is normal. The spleen is normal. The adrenals are normal. There are scattered benign simple cysts seen in both   kidneys with no follow-up recommended. The bowel demonstrates mild findings of obstipation in the colon. There is localized narrowing seen involving the hepatic flexure of the colon this likely represents an area of spasm since there is no evidence for   stranding of the adjacent fat to suggest colitis. There are moderate findings of diverticulosis with no evidence for diverticulitis. The pelvic organs demonstrates a calcification in the atrophic uterus most typical for some fibroids. The pelvic organs   are otherwise normal. The musculoskeletal region demonstrates a small fatty umbilical hernia. There are some scattered injection granulomas in the buttocks. Significant degenerative changes are seen throughout the spine to include degenerative disc   disease at the L4 and L5 levels.                                                                      IMPRESSION:     1. There is some loss of detail given patient motion especially in the lower extremities.     2. CTA of the left lower extremity shows loss of the peroneal artery in the midcalf region with no significant reconstitution. There is loss of the anterior tibial artery in the lower calf region. I believe the posterior tibial artery extend into the   foot although this is difficult to determine given motion. There is soft tissue edema seen in both lower extremities greatest in the left lower extremity.     3.There is localized narrowing seen involving the hepatic flexure of the colon this likely represents an area of spasm since there is no evidence for stranding of the adjacent fat to suggest colitis.         EXAM: RESTING ANKLE-BRACHIAL INDICES (ABIs)  LOCATION: [facility]  [facility]   DATE/TIME: 5/27/2022 7:12 AM     INDICATION: PAD.  COMPARISON: None.     JOAQUIN FINDINGS:  RIGHT  Brachial: 176  Ankle (PT): 157 Index: 0.87  Ankle (DP): 161 Index: 0.89  Digit: 73 Index: 0.41     LEFT  Brachial: 180  Ankle (PT): 172 Index: 0.96  Ankle (DP): 186 Index: 1.0  Digit: 46 Index: 0.26     The right JOAQUIN at rest is 0.89. The left JOAQUIN at rest is 1.0.                                                                        IMPRESSION:  1.  RIGHT LOWER EXTREMITY: JOAQUIN at rest is borderline abnormal. Right digital index is decreased however digital pressures are adequate for healing potential.  2.  LEFT LOWER EXTREMITY: JOAQUIN at rest is normal. Left digital index is decreased however digital pressures are adequate for healing potential.       Assessment and Plan:   Bilateral lower extremity edema/Lymphedema     -Venous competency negative for any venous incompetence.   -Renal function and liver function are normal. Albumin is normal.  -CT abdomen with no external compression/etiology.   -Echocardiogram with early/grade I diastolic dysfunction - this is unlikely to be the cause of this extent of edema.   -+ Lymphedema with + stemmer sign.   -Unable to get strong enough compression socks on and has now failed velcro wraps and ACE wraps as she can't get them on or off by herself.    -Swelling exacerbated by legs always in dependant position while sitting in wheelchair all day. The swelling and heaviness is significantly impacting her ability to walk and rehabilitate.      Recommendations:   -Unable to do any compression, including ACE wraps and velcro wraps by herself.   -Referral placed to Lymphedema Therapy. Consider manual lymphatic drainage. As she is unable to utilize socks or other wraps due to difficulty getting them on, consider lymphedema pumps and appreciate any guidance or tricks lymphedema therapy may have for compression.   -She should elevate her legs when sitting around.   -She  is to make sure she is not taking Amlodipine. She is not sure what medications are in her pill boxes and states lately things have been getting messed up. The chart here states amlodipine was stopped about 6 months ago, but she is to verify she is not taking it. She also was advised to bring an updated medication form to her next visit.   -She is to follow-up in 4-6 months to reassess how lymphedema therapy went.        Caitlin Osullivan PA-C      50 minutes spent on the date of the encounter doing chart review, history and exam, documentation, and further activities as noted above, all in the presence of a home health aid and  for her hearing disability.

## 2022-11-15 NOTE — PATIENT INSTRUCTIONS
A referral will be placed for Lymphedema Therapy.    2. Elevate legs whenever you can.     3.  Come see us in 4-6 months again to follow-up on how Lymphedema Therapy is going. Call us with any questions ([phone]).

## 2022-11-29 ENCOUNTER — HOSPITAL ENCOUNTER (OUTPATIENT)
Dept: PHYSICAL THERAPY | Facility: CLINIC | Age: 82
Setting detail: THERAPIES SERIES
Discharge: HOME | End: 2022-11-29
Attending: PHYSICIAN ASSISTANT
Payer: MEDICARE

## 2022-11-29 DIAGNOSIS — I89.0 LYMPHEDEMA: ICD-10-CM

## 2022-11-29 DIAGNOSIS — R60.9 EDEMA, UNSPECIFIED TYPE: ICD-10-CM

## 2022-11-29 PROCEDURE — 97140 MANUAL THERAPY 1/> REGIONS: CPT | Mod: GP | Performed by: PHYSICAL THERAPIST

## 2022-11-29 PROCEDURE — 97162 PT EVAL MOD COMPLEX 30 MIN: CPT | Mod: GP | Performed by: PHYSICAL THERAPIST

## 2023-01-01 NOTE — PLAN OF CARE
Problem: Diabetes Comorbidity  Goal: Blood Glucose Level Within Targeted Range  Outcome: Ongoing, Progressing     Problem: Plan of Care - These are the overarching goals to be used throughout the patient stay.    Goal: Plan of Care Review/Shift Note  Description: The Plan of Care Review/Shift note should be completed every shift.  The Outcome Evaluation is a brief statement about your assessment that the patient is improving, declining, or no change.  This information will be displayed automatically on your shift note.  Outcome: Ongoing, Progressing  Flowsheets (Taken 4/9/2022 1727)  Plan of Care Reviewed With: patient  Overall Patient Progress: improving  Goal: Absence of Hospital-Acquired Illness or Injury  Intervention: Identify and Manage Fall Risk  Recent Flowsheet Documentation  Taken 4/9/2022 1635 by Monique Ellison RN  Safety Promotion/Fall Prevention:   patient and family education   nonskid shoes/slippers when out of bed   mobility aid in reach   lighting adjusted   increase visualization of patient   fall prevention program maintained   activity supervised  Taken 4/9/2022 1245 by Monique Ellison, RN  Safety Promotion/Fall Prevention:   patient and family education   nonskid shoes/slippers when out of bed   mobility aid in reach   lighting adjusted   increase visualization of patient   fall prevention program maintained   activity supervised  Taken 4/9/2022 0833 by Monique Ellison, RN  Safety Promotion/Fall Prevention:   patient and family education   nonskid shoes/slippers when out of bed   mobility aid in reach   lighting adjusted   increase visualization of patient   fall prevention program maintained   activity supervised  Intervention: Prevent Skin Injury  Recent Flowsheet Documentation  Taken 4/9/2022 1635 by Monique Ellison, RN  Body Position:   position changed independently   supine  Intervention: Prevent and Manage VTE (Venous Thromboembolism) Risk  Recent Flowsheet Documentation  Taken  Spoke to mom and she reports that she just received a call from Toma, and she left a message stating that Semaj should be scheduled for an appointment with Dr. Gonzalez tomorrow or Friday.  Mom states that when she had baby in for the appointment on , she thought that the next appointment should be scheduled when Semaj was one month of age.      Discussed assessment/plan documented at the last visit on .    ASSESSMENT AND PLAN:  The patient is a 17 days, Gestational Age: 38w1d, female here for a well check.     Growth:  Semaj shows appropriate weight gain.  Formula feeding.     Urinating and stooling well.     Reflux discussed in detail.     [place] Hearing Screen: passed.      No jaundice.     Anticipatory guidance materials provided.     Will follow-up with Semaj in 2 days regarding weight and color or sooner should other issues arise    Mom reports that baby's color looks good, and she appears to be gaining weight well, but she would be happy to schedule the appointment.    Scheduled nurse appointment on Friday, 2023 @ 10:40  (early check in time of 10:25 discussed).    Encounter closed.     4/9/2022 1635 by Monique Ellison RN  Activity Management:   activity adjusted per tolerance   activity minimized   ambulated in room   up to bedside commode  Intervention: Prevent Infection  Recent Flowsheet Documentation  Taken 4/9/2022 1635 by Monique Ellison RN  Infection Prevention: hand hygiene promoted  Taken 4/9/2022 1245 by Monique Ellison RN  Infection Prevention: hand hygiene promoted  Taken 4/9/2022 0833 by Monique Ellison RN  Infection Prevention: hand hygiene promoted     Problem: Hypertension Comorbidity  Goal: Blood Pressure in Desired Range  Intervention: Maintain Blood Pressure Management  Recent Flowsheet Documentation  Taken 4/9/2022 1635 by Monique Ellison RN  Medication Review/Management: medications reviewed  Taken 4/9/2022 1245 by Monique Ellison RN  Medication Review/Management: medications reviewed  Taken 4/9/2022 0833 by Monique Ellison RN  Medication Review/Management: medications reviewed   Goal Outcome Evaluation:    Plan of Care Reviewed With: patient     Overall Patient Progress: improving       Pt is A&Ox4 on RA. Pt ambulated to [facility] for toileting. Pain managed by medication and repositioning. Pt spoke with pain NP. Call light is within reach, pt able to make wants and needs known.

## 2023-01-17 ENCOUNTER — HOSPITAL ENCOUNTER (OUTPATIENT)
Dept: PHYSICAL THERAPY | Facility: CLINIC | Age: 83
Setting detail: THERAPIES SERIES
Discharge: HOME | End: 2023-01-17
Attending: PHYSICIAN ASSISTANT
Payer: MEDICARE

## 2023-01-17 PROCEDURE — 97140 MANUAL THERAPY 1/> REGIONS: CPT | Mod: GP | Performed by: PHYSICAL THERAPIST

## 2023-01-17 NOTE — PROGRESS NOTES
[facility]        OUTPATIENT PHYSICAL THERAPY EDEMA EVALUATION  PLAN OF TREATMENT FOR OUTPATIENT REHABILITATION  (COMPLETE FOR INITIAL CLAIMS ONLY)  Patient's Last Name, First Name, JOHNCRISTÓBAL YangJumana  S                           Provider s Name:   [facility] Medical Record No.  [id number]     Start of Care Date:  11/29/22   Onset Date:  11/15/22   Type:  PT   Medical Diagnosis:  Lymphedema; Edema, unspecified type   Therapy Diagnosis:  BLE edema Visits from SOC:  1                                     __________________________________________________________________________________   Plan of Treatment/Functional Goals:             GOALS  1. Goal description: Pt will obtain and utilize appropriate BLE compression garments to manage BLE edema.       Target date: 03/18/23  2. Goal description: Pt will demonstrate understanding of edema HEP including exercise, compression and self massage.       Target date: 03/18/23  3. Goal description: Pt will demonstrate understanding of long term care management requirements for edema to prevent exacerbation or progression       Target date: 04/17/23      Treatment Frequency: 1x/week   Treatment duration: 90 days    Tawnya Lujan, PT                                    I CERTIFY THE NEED FOR THESE SERVICES FURNISHED UNDER        THIS PLAN OF TREATMENT AND WHILE UNDER MY CARE     (Physician co-signature of this document indicates review and certification of the therapy plan).                   Certification date from: 11/29/22       Certification date to: 02/27/23           Referring physician: Gokul Teran MD   Initial Assessment  See Epic Evaluation- Start of care: 11/29/22

## 2023-01-31 ENCOUNTER — HOSPITAL ENCOUNTER (OUTPATIENT)
Dept: PHYSICAL THERAPY | Facility: CLINIC | Age: 83
Setting detail: THERAPIES SERIES
Discharge: HOME | End: 2023-01-31
Attending: PHYSICIAN ASSISTANT
Payer: MEDICARE

## 2023-01-31 PROCEDURE — 97535 SELF CARE MNGMENT TRAINING: CPT | Mod: GP | Performed by: PHYSICAL THERAPIST

## 2023-02-06 ENCOUNTER — MEDICAL CORRESPONDENCE (OUTPATIENT)
Dept: HEALTH INFORMATION MANAGEMENT | Facility: CLINIC | Age: 83
End: 2023-02-06

## 2023-02-08 ENCOUNTER — TRANSCRIBE ORDERS (OUTPATIENT)
Dept: OTHER | Facility: CLINIC | Age: 83
End: 2023-02-08
Payer: MEDICARE

## 2023-02-08 DIAGNOSIS — R53.1 WEAKNESS: ICD-10-CM

## 2023-02-08 DIAGNOSIS — R29.6 FALLS FREQUENTLY: Primary | ICD-10-CM

## 2023-02-14 ENCOUNTER — HOSPITAL ENCOUNTER (OUTPATIENT)
Dept: PHYSICAL THERAPY | Facility: CLINIC | Age: 83
Setting detail: THERAPIES SERIES
Discharge: HOME | End: 2023-02-14
Attending: PHYSICIAN ASSISTANT
Payer: MEDICARE

## 2023-02-14 PROCEDURE — 97535 SELF CARE MNGMENT TRAINING: CPT | Mod: GP | Performed by: PHYSICAL THERAPIST

## 2023-02-14 NOTE — PROGRESS NOTES
[facility]    OUTPATIENT PHYSICAL THERAPY  PLAN OF TREATMENT FOR OUTPATIENT REHABILITATION AND PROGRESS NOTE           Patient's Last Name, First Name, Jumana Farfan Date of Birth  1940   Provider's Name  [facility] Medical Record No.  [id number]    Onset Date  11/15/22 Start of Care Date  11/29/22   Type:     _X_PT   ___OT   ___SLP Medical Diagnosis  Lymphedema   PT Diagnosis  BLE lymphedema Plan of Treatment  Frequency/Duration: up to 6 visits  Certification date from 2/14/23 to 05/15/23     Goals:  Goal Identifier Garments   Goal Description Pt will obtain and utilize appropriate BLE compression garments to manage BLE edema.   Target Date 03/18/23   Date Met  02/14/23   Progress (detail required for progress note): Pt has recieved and can don appropriate BLE compression     Goal Identifier HEP   Goal Description Pt will demonstrate understanding of edema HEP including exercise, compression and self massage.   Target Date 03/18/23   Date Met      Progress (detail required for progress note): In progress, pt still requires reinforcement regarding need for consistent compression     Goal Identifier Edema edu   Goal Description Pt will demonstrate understanding of long term care management requirements for edema to prevent exacerbation or progression   Target Date 04/17/23   Date Met      Progress (detail required for progress note): In progress, pt continues to require reinforcement on need for LE compression       Beginning/End Dates of Progress Note Reporting Period:  11/29/23 to 04/14/23    Progress Toward Goals:   Progress this reporting period: Pt has obtained and is utilizing BLE compression, although not consistently at this time. This session she shows ability to shannon/ doff garments without physical assist, and PCA is independent in donning  appropriately. Pt continues to require education and reinforcement on daily use, but on track to meet goals.     Client Self (Subjective) Report for Progress Note Reporting Period: Pt arriving with PCA, Son not able to attend. No intrepreter available/ system not working. Pt does report use of compression some, but did use for the last 3 days due to dental pain    Outcome Measures (Most Recent Score):         Objective Measurements:   Objective Measure: Volume  Details: Deferred measurements due to limited time  Objective Measure: Skin/ Edema  Details: Appears improved, 1+ pitting to dorsal foot, no pitting at pretibial today               I CERTIFY THE NEED FOR THESE SERVICES FURNISHED UNDER        THIS PLAN OF TREATMENT AND WHILE UNDER MY CARE     (Physician co-signature of this document indicates review and certification of the therapy plan).                Referring Provider: SAMUEL OSHEA MD, PT

## 2023-02-24 ENCOUNTER — HOSPITAL ENCOUNTER (OUTPATIENT)
Dept: PHYSICAL THERAPY | Facility: CLINIC | Age: 83
Setting detail: THERAPIES SERIES
Discharge: HOME | End: 2023-02-24
Attending: PHYSICIAN ASSISTANT
Payer: MEDICARE

## 2023-02-24 PROCEDURE — 97535 SELF CARE MNGMENT TRAINING: CPT | Mod: GP | Performed by: PHYSICAL THERAPIST

## 2023-02-28 ENCOUNTER — TELEPHONE (OUTPATIENT)
Dept: AUDIOLOGY | Facility: CLINIC | Age: 83
End: 2023-02-28
Payer: MEDICARE

## 2023-03-01 ENCOUNTER — OFFICE VISIT (OUTPATIENT)
Dept: AUDIOLOGY | Facility: CLINIC | Age: 83
End: 2023-03-01
Payer: COMMERCIAL

## 2023-03-01 DIAGNOSIS — H90.3 SENSORINEURAL HEARING LOSS, BILATERAL: Primary | ICD-10-CM

## 2023-03-01 PROCEDURE — V5266 BATTERY FOR HEARING DEVICE: HCPCS | Mod: NU | Performed by: AUDIOLOGIST

## 2023-03-01 NOTE — TELEPHONE ENCOUNTER
Walk-in hearing aid services on 2/28/23: The patient's son brought her older Rexton BTE and earmold to the hearing aid clinic asking to have new tube placed on the earmold.  The hearing aid and earmold were cleaned and the earmold tubing was replaced.  Afterwards the hearing aid was found to be working properly and was returned to the patient's son along with 24 size 13 batteries.

## 2023-03-02 ENCOUNTER — MEDICAL CORRESPONDENCE (OUTPATIENT)
Dept: HEALTH INFORMATION MANAGEMENT | Facility: CLINIC | Age: 83
End: 2023-03-02

## 2023-03-20 ENCOUNTER — ANESTHESIA EVENT (OUTPATIENT)
Dept: SURGERY | Facility: CLINIC | Age: 83
DRG: 042 | End: 2023-03-20
Payer: MEDICARE

## 2023-03-20 ENCOUNTER — HOSPITAL ENCOUNTER (OUTPATIENT)
Dept: PHYSICAL THERAPY | Facility: CLINIC | Age: 83
Setting detail: THERAPIES SERIES
Discharge: HOME | End: 2023-03-20
Attending: INTERNAL MEDICINE
Payer: MEDICARE

## 2023-03-20 PROCEDURE — 97530 THERAPEUTIC ACTIVITIES: CPT | Mod: GP | Performed by: PHYSICAL THERAPIST

## 2023-03-20 PROCEDURE — 97162 PT EVAL MOD COMPLEX 30 MIN: CPT | Mod: GP | Performed by: PHYSICAL THERAPIST

## 2023-03-20 RX ORDER — DIPHENOXYLATE HCL/ATROPINE 2.5-.025MG
1 TABLET ORAL 4 TIMES DAILY PRN
Status: ON HOLD | COMMUNITY
End: 2023-03-27

## 2023-03-20 RX ORDER — TRIAMTERENE AND HYDROCHLOROTHIAZIDE 37.5; 25 MG/1; MG/1
1 CAPSULE ORAL EVERY MORNING
COMMUNITY

## 2023-03-20 RX ORDER — BUSPIRONE HYDROCHLORIDE 7.5 MG/1
7.5 TABLET ORAL 2 TIMES DAILY
COMMUNITY

## 2023-03-20 RX ORDER — DIAZEPAM 2 MG
TABLET ORAL EVERY 6 HOURS PRN
Status: ON HOLD | COMMUNITY
End: 2023-03-22

## 2023-03-20 RX ORDER — THYROID 30 MG/1
30 TABLET ORAL DAILY
COMMUNITY
End: 2023-03-21

## 2023-03-20 RX ORDER — CLOBETASOL PROPIONATE 0.5 MG/G
CREAM TOPICAL 2 TIMES DAILY
COMMUNITY
End: 2024-01-28

## 2023-03-20 NOTE — OR NURSING
Patient is to be admitted for overnight stay and place in TCU on Wednesday. Davie son called and stated he has a bed on hold at [facility] in [facility] Director number is [phone]. Called  to find out what orders we needs to SNF in hopes of expediting transfer and orders.    JESUS Fields is to put in Skilled nursing facility order set on admission.   Davie son number [phone].

## 2023-03-20 NOTE — OR NURSING
preop call to pt, pt upset re ? Where her preop was done, wanted son Davie to be called , son called at number provided, voicemail left

## 2023-03-21 ENCOUNTER — ANESTHESIA (OUTPATIENT)
Dept: SURGERY | Facility: CLINIC | Age: 83
DRG: 042 | End: 2023-03-21
Payer: MEDICARE

## 2023-03-21 ENCOUNTER — HOSPITAL ENCOUNTER (INPATIENT)
Facility: CLINIC | Age: 83
LOS: 6 days | Discharge: NURSING, REHAB OR LONG-TERM CARE | DRG: 042 | End: 2023-03-27
Attending: ORTHOPAEDIC SURGERY | Admitting: ORTHOPAEDIC SURGERY
Payer: MEDICARE

## 2023-03-21 DIAGNOSIS — G89.4 CHRONIC PAIN SYNDROME: ICD-10-CM

## 2023-03-21 DIAGNOSIS — L29.9 ITCHING: ICD-10-CM

## 2023-03-21 DIAGNOSIS — G56.02 CARPAL TUNNEL SYNDROME OF LEFT WRIST: Primary | ICD-10-CM

## 2023-03-21 DIAGNOSIS — M48.00 SPINAL STENOSIS, UNSPECIFIED SPINAL REGION: ICD-10-CM

## 2023-03-21 LAB
CREAT SERPL-MCNC: 0.89 MG/DL (ref 0.51–0.95)
FASTING STATUS PATIENT QL REPORTED: YES
GFR SERPL CREATININE-BSD FRML MDRD: 65 ML/MIN/1.73M2
GLUCOSE BLDC GLUCOMTR-MCNC: 114 MG/DL (ref 70–99)
GLUCOSE BLDC GLUCOMTR-MCNC: 167 MG/DL (ref 70–99)
GLUCOSE SERPL-MCNC: 102 MG/DL (ref 70–99)
HBA1C MFR BLD: 6.9 %
HOLD SPECIMEN: NORMAL
POTASSIUM SERPL-SCNC: 3.4 MMOL/L (ref 3.4–5.3)

## 2023-03-21 PROCEDURE — 272N000001 HC OR GENERAL SUPPLY STERILE: Performed by: ORTHOPAEDIC SURGERY

## 2023-03-21 PROCEDURE — 999N000141 HC STATISTIC PRE-PROCEDURE NURSING ASSESSMENT: Performed by: ORTHOPAEDIC SURGERY

## 2023-03-21 PROCEDURE — 250N000011 HC RX IP 250 OP 636

## 2023-03-21 PROCEDURE — 360N000075 HC SURGERY LEVEL 2, PER MIN: Performed by: ORTHOPAEDIC SURGERY

## 2023-03-21 PROCEDURE — 01N50ZZ RELEASE MEDIAN NERVE, OPEN APPROACH: ICD-10-PCS | Performed by: ORTHOPAEDIC SURGERY

## 2023-03-21 PROCEDURE — 250N000013 HC RX MED GY IP 250 OP 250 PS 637: Performed by: ORTHOPAEDIC SURGERY

## 2023-03-21 PROCEDURE — 99221 1ST HOSP IP/OBS SF/LOW 40: CPT | Performed by: PHYSICIAN ASSISTANT

## 2023-03-21 PROCEDURE — 370N000017 HC ANESTHESIA TECHNICAL FEE, PER MIN: Performed by: ORTHOPAEDIC SURGERY

## 2023-03-21 PROCEDURE — 250N000013 HC RX MED GY IP 250 OP 250 PS 637: Performed by: PHYSICIAN ASSISTANT

## 2023-03-21 PROCEDURE — 83036 HEMOGLOBIN GLYCOSYLATED A1C: CPT | Performed by: PHYSICIAN ASSISTANT

## 2023-03-21 PROCEDURE — 258N000003 HC RX IP 258 OP 636

## 2023-03-21 PROCEDURE — 120N000001 HC R&B MED SURG/OB

## 2023-03-21 PROCEDURE — 36415 COLL VENOUS BLD VENIPUNCTURE: CPT | Performed by: ANESTHESIOLOGY

## 2023-03-21 PROCEDURE — 710N000009 HC RECOVERY PHASE 1, LEVEL 1, PER MIN: Performed by: ORTHOPAEDIC SURGERY

## 2023-03-21 PROCEDURE — 250N000011 HC RX IP 250 OP 636: Performed by: ORTHOPAEDIC SURGERY

## 2023-03-21 PROCEDURE — 82947 ASSAY GLUCOSE BLOOD QUANT: CPT | Performed by: ANESTHESIOLOGY

## 2023-03-21 PROCEDURE — 250N000011 HC RX IP 250 OP 636: Performed by: ANESTHESIOLOGY

## 2023-03-21 PROCEDURE — 84132 ASSAY OF SERUM POTASSIUM: CPT | Performed by: ANESTHESIOLOGY

## 2023-03-21 PROCEDURE — 250N000009 HC RX 250: Performed by: ORTHOPAEDIC SURGERY

## 2023-03-21 PROCEDURE — 250N000009 HC RX 250

## 2023-03-21 PROCEDURE — 82565 ASSAY OF CREATININE: CPT | Performed by: ANESTHESIOLOGY

## 2023-03-21 RX ORDER — METHOCARBAMOL 500 MG/1
500 TABLET, FILM COATED ORAL 3 TIMES DAILY PRN
Status: DISCONTINUED | OUTPATIENT
Start: 2023-03-21 | End: 2023-03-27 | Stop reason: HOSPADM

## 2023-03-21 RX ORDER — POLYETHYLENE GLYCOL 3350 17 G/17G
1 POWDER, FOR SOLUTION ORAL DAILY
Status: ON HOLD | COMMUNITY
End: 2023-04-01

## 2023-03-21 RX ORDER — ONDANSETRON 2 MG/ML
INJECTION INTRAMUSCULAR; INTRAVENOUS PRN
Status: DISCONTINUED | OUTPATIENT
Start: 2023-03-21 | End: 2023-03-21

## 2023-03-21 RX ORDER — MAGNESIUM HYDROXIDE 1200 MG/15ML
LIQUID ORAL PRN
Status: DISCONTINUED | OUTPATIENT
Start: 2023-03-21 | End: 2023-03-21 | Stop reason: HOSPADM

## 2023-03-21 RX ORDER — HYDROXYZINE HYDROCHLORIDE 25 MG/1
25 TABLET, FILM COATED ORAL 3 TIMES DAILY PRN
Status: DISCONTINUED | OUTPATIENT
Start: 2023-03-21 | End: 2023-03-22

## 2023-03-21 RX ORDER — FENTANYL CITRATE 50 UG/ML
INJECTION, SOLUTION INTRAMUSCULAR; INTRAVENOUS PRN
Status: DISCONTINUED | OUTPATIENT
Start: 2023-03-21 | End: 2023-03-21

## 2023-03-21 RX ORDER — PANTOPRAZOLE SODIUM 40 MG/1
40 TABLET, DELAYED RELEASE ORAL
Status: DISCONTINUED | OUTPATIENT
Start: 2023-03-22 | End: 2023-03-27 | Stop reason: HOSPADM

## 2023-03-21 RX ORDER — PREDNISONE 5 MG/1
5 TABLET ORAL EVERY MORNING
COMMUNITY

## 2023-03-21 RX ORDER — DULOXETIN HYDROCHLORIDE 60 MG/1
60 CAPSULE, DELAYED RELEASE ORAL EVERY MORNING
COMMUNITY

## 2023-03-21 RX ORDER — GABAPENTIN 100 MG/1
100 CAPSULE ORAL 3 TIMES DAILY
COMMUNITY
End: 2023-03-21

## 2023-03-21 RX ORDER — PROCHLORPERAZINE MALEATE 5 MG
5 TABLET ORAL EVERY 6 HOURS PRN
Status: DISCONTINUED | OUTPATIENT
Start: 2023-03-21 | End: 2023-03-27 | Stop reason: HOSPADM

## 2023-03-21 RX ORDER — AMOXICILLIN 250 MG
1 CAPSULE ORAL 2 TIMES DAILY
Status: DISCONTINUED | OUTPATIENT
Start: 2023-03-21 | End: 2023-03-27 | Stop reason: HOSPADM

## 2023-03-21 RX ORDER — OXYCODONE HYDROCHLORIDE 5 MG/1
5 TABLET ORAL EVERY 4 HOURS PRN
Status: DISCONTINUED | OUTPATIENT
Start: 2023-03-21 | End: 2023-03-22

## 2023-03-21 RX ORDER — LIDOCAINE 40 MG/G
CREAM TOPICAL
Status: DISCONTINUED | OUTPATIENT
Start: 2023-03-21 | End: 2023-03-27 | Stop reason: HOSPADM

## 2023-03-21 RX ORDER — SODIUM CHLORIDE, SODIUM LACTATE, POTASSIUM CHLORIDE, CALCIUM CHLORIDE 600; 310; 30; 20 MG/100ML; MG/100ML; MG/100ML; MG/100ML
INJECTION, SOLUTION INTRAVENOUS CONTINUOUS
Status: DISCONTINUED | OUTPATIENT
Start: 2023-03-21 | End: 2023-03-27 | Stop reason: HOSPADM

## 2023-03-21 RX ORDER — HYDROMORPHONE HCL IN WATER/PF 6 MG/30 ML
0.4 PATIENT CONTROLLED ANALGESIA SYRINGE INTRAVENOUS EVERY 5 MIN PRN
Status: DISCONTINUED | OUTPATIENT
Start: 2023-03-21 | End: 2023-03-21 | Stop reason: HOSPADM

## 2023-03-21 RX ORDER — NALOXONE HYDROCHLORIDE 0.4 MG/ML
0.2 INJECTION, SOLUTION INTRAMUSCULAR; INTRAVENOUS; SUBCUTANEOUS
Status: DISCONTINUED | OUTPATIENT
Start: 2023-03-21 | End: 2023-03-27 | Stop reason: HOSPADM

## 2023-03-21 RX ORDER — ONDANSETRON 2 MG/ML
4 INJECTION INTRAMUSCULAR; INTRAVENOUS EVERY 30 MIN PRN
Status: DISCONTINUED | OUTPATIENT
Start: 2023-03-21 | End: 2023-03-21 | Stop reason: HOSPADM

## 2023-03-21 RX ORDER — DULOXETIN HYDROCHLORIDE 60 MG/1
60 CAPSULE, DELAYED RELEASE ORAL EVERY MORNING
Status: DISCONTINUED | OUTPATIENT
Start: 2023-03-22 | End: 2023-03-27 | Stop reason: HOSPADM

## 2023-03-21 RX ORDER — FENTANYL CITRATE 0.05 MG/ML
25 INJECTION, SOLUTION INTRAMUSCULAR; INTRAVENOUS EVERY 5 MIN PRN
Status: DISCONTINUED | OUTPATIENT
Start: 2023-03-21 | End: 2023-03-21 | Stop reason: HOSPADM

## 2023-03-21 RX ORDER — LIDOCAINE HYDROCHLORIDE 20 MG/ML
INJECTION, SOLUTION INFILTRATION; PERINEURAL PRN
Status: DISCONTINUED | OUTPATIENT
Start: 2023-03-21 | End: 2023-03-21

## 2023-03-21 RX ORDER — PROPOFOL 10 MG/ML
INJECTION, EMULSION INTRAVENOUS PRN
Status: DISCONTINUED | OUTPATIENT
Start: 2023-03-21 | End: 2023-03-21

## 2023-03-21 RX ORDER — CEFAZOLIN SODIUM/WATER 2 G/20 ML
2 SYRINGE (ML) INTRAVENOUS
Status: COMPLETED | OUTPATIENT
Start: 2023-03-21 | End: 2023-03-21

## 2023-03-21 RX ORDER — PREDNISONE 1 MG/1
1 TABLET ORAL EVERY MORNING
Status: DISCONTINUED | OUTPATIENT
Start: 2023-03-22 | End: 2023-03-27 | Stop reason: HOSPADM

## 2023-03-21 RX ORDER — SODIUM CHLORIDE, SODIUM LACTATE, POTASSIUM CHLORIDE, CALCIUM CHLORIDE 600; 310; 30; 20 MG/100ML; MG/100ML; MG/100ML; MG/100ML
INJECTION, SOLUTION INTRAVENOUS CONTINUOUS
Status: DISCONTINUED | OUTPATIENT
Start: 2023-03-21 | End: 2023-03-21 | Stop reason: HOSPADM

## 2023-03-21 RX ORDER — OXYCODONE HYDROCHLORIDE 5 MG/1
10 TABLET ORAL EVERY 4 HOURS PRN
Status: DISCONTINUED | OUTPATIENT
Start: 2023-03-21 | End: 2023-03-22

## 2023-03-21 RX ORDER — PROPOFOL 10 MG/ML
INJECTION, EMULSION INTRAVENOUS CONTINUOUS PRN
Status: DISCONTINUED | OUTPATIENT
Start: 2023-03-21 | End: 2023-03-21

## 2023-03-21 RX ORDER — DIAZEPAM 2 MG
2 TABLET ORAL EVERY 6 HOURS PRN
Status: DISCONTINUED | OUTPATIENT
Start: 2023-03-21 | End: 2023-03-27 | Stop reason: HOSPADM

## 2023-03-21 RX ORDER — PREGABALIN 50 MG/1
50 CAPSULE ORAL 2 TIMES DAILY
Status: DISCONTINUED | OUTPATIENT
Start: 2023-03-21 | End: 2023-03-27 | Stop reason: HOSPADM

## 2023-03-21 RX ORDER — ONDANSETRON 2 MG/ML
4 INJECTION INTRAMUSCULAR; INTRAVENOUS EVERY 6 HOURS PRN
Status: DISCONTINUED | OUTPATIENT
Start: 2023-03-21 | End: 2023-03-27 | Stop reason: HOSPADM

## 2023-03-21 RX ORDER — CEFAZOLIN SODIUM/WATER 2 G/20 ML
2 SYRINGE (ML) INTRAVENOUS SEE ADMIN INSTRUCTIONS
Status: DISCONTINUED | OUTPATIENT
Start: 2023-03-21 | End: 2023-03-21 | Stop reason: HOSPADM

## 2023-03-21 RX ORDER — ACETAMINOPHEN 325 MG/1
650 TABLET ORAL EVERY 4 HOURS PRN
Status: DISCONTINUED | OUTPATIENT
Start: 2023-03-24 | End: 2023-03-22

## 2023-03-21 RX ORDER — AMLODIPINE BESYLATE 10 MG/1
10 TABLET ORAL DAILY
COMMUNITY
End: 2023-03-21

## 2023-03-21 RX ORDER — FENTANYL CITRATE 0.05 MG/ML
50 INJECTION, SOLUTION INTRAMUSCULAR; INTRAVENOUS EVERY 5 MIN PRN
Status: DISCONTINUED | OUTPATIENT
Start: 2023-03-21 | End: 2023-03-21 | Stop reason: HOSPADM

## 2023-03-21 RX ORDER — PREGABALIN 50 MG/1
50 CAPSULE ORAL 2 TIMES DAILY
Status: ON HOLD | COMMUNITY
End: 2023-03-27

## 2023-03-21 RX ORDER — ONDANSETRON 4 MG/1
4 TABLET, ORALLY DISINTEGRATING ORAL EVERY 30 MIN PRN
Status: DISCONTINUED | OUTPATIENT
Start: 2023-03-21 | End: 2023-03-21 | Stop reason: HOSPADM

## 2023-03-21 RX ORDER — POLYETHYLENE GLYCOL 3350 17 G/17G
17 POWDER, FOR SOLUTION ORAL DAILY
Status: DISCONTINUED | OUTPATIENT
Start: 2023-03-22 | End: 2023-03-27 | Stop reason: HOSPADM

## 2023-03-21 RX ORDER — NALOXONE HYDROCHLORIDE 0.4 MG/ML
0.4 INJECTION, SOLUTION INTRAMUSCULAR; INTRAVENOUS; SUBCUTANEOUS
Status: DISCONTINUED | OUTPATIENT
Start: 2023-03-21 | End: 2023-03-27 | Stop reason: HOSPADM

## 2023-03-21 RX ORDER — BISACODYL 10 MG
10 SUPPOSITORY, RECTAL RECTAL DAILY PRN
Status: DISCONTINUED | OUTPATIENT
Start: 2023-03-21 | End: 2023-03-27 | Stop reason: HOSPADM

## 2023-03-21 RX ORDER — LAMOTRIGINE 100 MG/1
100 TABLET ORAL 2 TIMES DAILY
Status: DISCONTINUED | OUTPATIENT
Start: 2023-03-21 | End: 2023-03-27 | Stop reason: HOSPADM

## 2023-03-21 RX ORDER — SODIUM CHLORIDE, SODIUM LACTATE, POTASSIUM CHLORIDE, CALCIUM CHLORIDE 600; 310; 30; 20 MG/100ML; MG/100ML; MG/100ML; MG/100ML
INJECTION, SOLUTION INTRAVENOUS CONTINUOUS PRN
Status: DISCONTINUED | OUTPATIENT
Start: 2023-03-21 | End: 2023-03-21

## 2023-03-21 RX ORDER — ONDANSETRON 4 MG/1
4 TABLET, ORALLY DISINTEGRATING ORAL EVERY 6 HOURS PRN
Status: DISCONTINUED | OUTPATIENT
Start: 2023-03-21 | End: 2023-03-27 | Stop reason: HOSPADM

## 2023-03-21 RX ORDER — NICOTINE POLACRILEX 4 MG
15-30 LOZENGE BUCCAL
Status: DISCONTINUED | OUTPATIENT
Start: 2023-03-21 | End: 2023-03-27 | Stop reason: HOSPADM

## 2023-03-21 RX ORDER — DEXTROSE MONOHYDRATE 25 G/50ML
25-50 INJECTION, SOLUTION INTRAVENOUS
Status: DISCONTINUED | OUTPATIENT
Start: 2023-03-21 | End: 2023-03-27 | Stop reason: HOSPADM

## 2023-03-21 RX ORDER — PREDNISONE 5 MG/1
5 TABLET ORAL EVERY MORNING
Status: DISCONTINUED | OUTPATIENT
Start: 2023-03-22 | End: 2023-03-27 | Stop reason: HOSPADM

## 2023-03-21 RX ORDER — HYDROMORPHONE HCL IN WATER/PF 6 MG/30 ML
0.2 PATIENT CONTROLLED ANALGESIA SYRINGE INTRAVENOUS EVERY 5 MIN PRN
Status: DISCONTINUED | OUTPATIENT
Start: 2023-03-21 | End: 2023-03-21 | Stop reason: HOSPADM

## 2023-03-21 RX ADMIN — FENTANYL CITRATE 25 MCG: 50 INJECTION, SOLUTION INTRAMUSCULAR; INTRAVENOUS at 13:42

## 2023-03-21 RX ADMIN — FENTANYL CITRATE 25 MCG: 50 INJECTION, SOLUTION INTRAMUSCULAR; INTRAVENOUS at 12:25

## 2023-03-21 RX ADMIN — DIAZEPAM 2 MG: 2 TABLET ORAL at 20:19

## 2023-03-21 RX ADMIN — LAMOTRIGINE 100 MG: 100 TABLET ORAL at 20:19

## 2023-03-21 RX ADMIN — FENTANYL CITRATE 25 MCG: 50 INJECTION, SOLUTION INTRAMUSCULAR; INTRAVENOUS at 13:26

## 2023-03-21 RX ADMIN — FENTANYL CITRATE 25 MCG: 50 INJECTION, SOLUTION INTRAMUSCULAR; INTRAVENOUS at 13:48

## 2023-03-21 RX ADMIN — BUSPIRONE HYDROCHLORIDE 7.5 MG: 5 TABLET ORAL at 20:19

## 2023-03-21 RX ADMIN — Medication 2 G: at 12:05

## 2023-03-21 RX ADMIN — PROPOFOL 30 MG: 10 INJECTION, EMULSION INTRAVENOUS at 12:10

## 2023-03-21 RX ADMIN — OXYCODONE HYDROCHLORIDE 10 MG: 5 TABLET ORAL at 23:44

## 2023-03-21 RX ADMIN — SODIUM CHLORIDE, POTASSIUM CHLORIDE, SODIUM LACTATE AND CALCIUM CHLORIDE: 600; 310; 30; 20 INJECTION, SOLUTION INTRAVENOUS at 12:05

## 2023-03-21 RX ADMIN — PREGABALIN 50 MG: 50 CAPSULE ORAL at 20:19

## 2023-03-21 RX ADMIN — ONDANSETRON 4 MG: 2 INJECTION INTRAMUSCULAR; INTRAVENOUS at 12:10

## 2023-03-21 RX ADMIN — OXYCODONE HYDROCHLORIDE 5 MG: 5 TABLET ORAL at 15:25

## 2023-03-21 RX ADMIN — OXYCODONE HYDROCHLORIDE 10 MG: 5 TABLET ORAL at 19:34

## 2023-03-21 RX ADMIN — PROPOFOL 150 MCG/KG/MIN: 10 INJECTION, EMULSION INTRAVENOUS at 12:10

## 2023-03-21 RX ADMIN — LIDOCAINE HYDROCHLORIDE 40 MG: 20 INJECTION, SOLUTION INFILTRATION; PERINEURAL at 12:10

## 2023-03-21 RX ADMIN — FENTANYL CITRATE 25 MCG: 50 INJECTION, SOLUTION INTRAMUSCULAR; INTRAVENOUS at 13:32

## 2023-03-21 ASSESSMENT — ACTIVITIES OF DAILY LIVING (ADL)
DOING_ERRANDS_INDEPENDENTLY_DIFFICULTY: NO
DRESSING/BATHING_DIFFICULTY: YES
VISION_MANAGEMENT: READING GLASSES
CONCENTRATING,_REMEMBERING_OR_MAKING_DECISIONS_DIFFICULTY: NO
CHANGE_IN_FUNCTIONAL_STATUS_SINCE_ONSET_OF_CURRENT_ILLNESS/INJURY: NO
DRESSING/BATHING_MANAGEMENT: GRAB BARS
NUMBER_OF_TIMES_PATIENT_HAS_FALLEN_WITHIN_LAST_SIX_MONTHS: 5
ADLS_ACUITY_SCORE: 34
WALKING_OR_CLIMBING_STAIRS: AMBULATION DIFFICULTY, ASSISTANCE 1 PERSON
ADLS_ACUITY_SCORE: 37
DIFFICULTY_EATING/SWALLOWING: NO
WEAR_GLASSES_OR_BLIND: YES
ADLS_ACUITY_SCORE: 34
TOILETING_ASSISTANCE: TOILETING DIFFICULTY, ASSISTANCE 1 PERSON
ADLS_ACUITY_SCORE: 38
FALL_HISTORY_WITHIN_LAST_SIX_MONTHS: YES
ADLS_ACUITY_SCORE: 38
WALKING_OR_CLIMBING_STAIRS_DIFFICULTY: YES
DRESSING/BATHING: BATHING DIFFICULTY, REQUIRES EQUIPMENT
ADLS_ACUITY_SCORE: 38
ADLS_ACUITY_SCORE: 38
TOILETING_ISSUES: YES

## 2023-03-21 ASSESSMENT — LIFESTYLE VARIABLES: TOBACCO_USE: 1

## 2023-03-21 NOTE — ANESTHESIA CARE TRANSFER NOTE
Patient: Jumana Yang    Procedure: Procedure(s):  LEFT OPEN CARPAL TUNNEL RELEASE. LEFT RING FINGER A1 PULLEY RELEASE       Diagnosis: Acute carpal tunnel syndrome of left wrist [G56.02]  Diagnosis Additional Information: No value filed.    Anesthesia Type:   MAC     Note:    Oropharynx: oropharynx clear of all foreign objects and spontaneously breathing  Level of Consciousness: awake  Oxygen Supplementation: room air    Independent Airway: airway patency satisfactory and stable  Dentition: dentition unchanged  Vital Signs Stable: post-procedure vital signs reviewed and stable  Report to RN Given: handoff report given  Patient transferred to: PACU    Handoff Report: Identifed the Patient, Identified the Reponsible Provider, Reviewed the pertinent medical history, Discussed the surgical course, Reviewed Intra-OP anesthesia mangement and issues during anesthesia, Set expectations for post-procedure period and Allowed opportunity for questions and acknowledgement of understanding      Vitals:  Vitals Value Taken Time   BP     Temp     Pulse     Resp 12    SpO2 97        Electronically Signed By: JUAN ANTONIO Aponte CRNA  March 21, 2023  1:07 PM

## 2023-03-21 NOTE — PROGRESS NOTES
PTA medications completed by Medication Scribe day of surgery     Medication history sources: Patient's family/friend (Davie (son)) and (CHERELLE Walters )  In the past week, patient estimated taking medication this percent of the time: Greater than 90%  Adherence assessment: N/A Not Observed    Significant changes made to the medication list:  None      Additional medication history information:   None    Medication reconciliation completed by provider prior to medication history? No    Time spent in this activity: 35 minutes    The information provided in this note is only as accurate as the sources available at the time of update(s)    Prior to Admission medications    Medication Sig Last Dose Taking? Auth Provider Long Term End Date   busPIRone (BUSPAR) 7.5 MG tablet Take 7.5 mg by mouth 3 times daily 3/20/2023 at pm Yes Reported, Patient Yes    calamine 8-8 % lotion Apply topically every hour as needed for itching Unknown at prn Yes Felipe Philippe MD     clobetasol (TEMOVATE) 0.05 % external cream Apply topically 2 times daily 3/20/2023 at pm Yes Reported, Patient     cyanocobalamin 1000 MCG SUBL Place 1,000 mcg under the tongue daily 3/20/2023 at am Yes Unknown, Entered By History     diazepam (VALIUM) 2 MG tablet Take by mouth every 6 hours as needed for anxiety Unknown at prn Yes Reported, Patient     DIAZEPAM 5 MG/5ML solution Take 0.5 mg by mouth 2 times daily as needed for anxiety Unknown at prn Yes Unknown, Entered By History     diphenoxylate-atropine (LOMOTIL) 2.5-0.025 MG tablet Take 1 tablet by mouth 4 times daily as needed for diarrhea  Yes Reported, Patient     DULoxetine (CYMBALTA) 60 MG capsule Take 60 mg by mouth every morning 3/20/2023 at am Yes Reported, Patient No    glipiZIDE (GLUCOTROL) 10 MG tablet Take 1 tablet (10 mg) by mouth 2 times daily (before meals) 3/20/2023 at pm Yes Hayder Mishra MD Yes    hydrOXYzine (ATARAX) 25 MG tablet Take 25 mg by mouth 3 times daily as needed for  anxiety Unknown at prn Yes Unknown, Entered By History     JARDIANCE 25 MG TABS tablet Take 25 mg by mouth every morning 3/20/2023 at am Yes Reported, Patient     lamoTRIgine (LAMICTAL) 100 MG tablet Take 100 mg by mouth 2 times daily 3/20/2023 at pm Yes Unknown, Entered By History Yes    metFORMIN (GLUCOPHAGE) 500 MG tablet Take 500 mg by mouth 2 times daily (with meals) 3/20/2023 at pm Yes Unknown, Entered By History No    methocarbamol (ROBAXIN) 500 MG tablet Take 1 tablet (500 mg) by mouth 3 times daily as needed for muscle spasms Unknown at prn Yes Felipe Philippe MD     miconazole (MICATIN) 2 % external powder Apply topically 2 times daily as needed To the skin fold Unknown at prn Yes Ml López MD     NONFORMULARY Take 1 tablet by mouth every morning MEDICATION NAME: Buffered Berberine 350 mg 3/20/2023 at am Yes Reported, Patient     omeprazole (PRILOSEC) 20 MG DR capsule Take 20 mg by mouth daily 3/20/2023 at am Yes Reported, Patient     oxybutynin ER (DITROPAN XL) 15 MG 24 hr tablet Take 15 mg by mouth every morning 3/20/2023 at am Yes Unknown, Entered By History     oxyCODONE-acetaminophen (PERCOCET) 5-325 MG tablet Take 1 tablet by mouth every 6 hours as needed for severe pain (Max 4 tablets daily) 3/21/2023 at 0700 Yes Felipe Philippe MD No    polyethylene glycol (MIRALAX) 17 g packet Take 1 packet by mouth daily as needed for constipation Unknown at prn Yes Reported, Patient     predniSONE (DELTASONE) 1 MG tablet Take 1 mg by mouth every morning (In addition to the 5 mg dose; 1 mg + 5 mg = 6 mg) 3/20/2023 at am Yes Unknown, Entered By History No    predniSONE (DELTASONE) 5 MG tablet Take 5 mg by mouth every morning (In addition to the 1 mg dose; 5 mg + 1 mg = 6 mg) 3/20/2023 at am Yes Reported, Patient No    pregabalin (LYRICA) 50 MG capsule Take 50 mg by mouth 2 times daily 3/20/2023 at pm Yes Reported, Patient No    senna-docusate (SENOKOT-S/PERICOLACE) 8.6-50 MG tablet Take 1 tablet by mouth  2 times daily Unknown at prn Yes Rebecca Sutton MD     triamcinolone (KENALOG) 0.1 % external cream Apply topically 2 times daily as needed Unknown at prn Yes Reported, Patient     triamterene-HCTZ (DYAZIDE) 37.5-25 MG capsule Take by mouth every morning 3/20/2023 at am Yes Reported, Patient No    vitamin D3 (CHOLECALCIFEROL) 50 mcg (2000 units) tablet Take 3 tablets by mouth daily 3/20/2023 at am Yes Unknown, Entered By History     blood glucose (ACCU-CHEK FASTCLIX) lancing device FOR TESTING ONCE DAILY. DX  E11.9 TYPE 2 DIABETES   Reported, Patient     blood glucose (CONTOUR TEST) test strip TESTING EVERY DAY DX  E11.9   Reported, Patient     CONTOUR NEXT TEST test strip TESTING EVERY DAY DX  E11.9   Reported, Patient     order for DME Equipment being ordered: wrist brace  Patient not taking: Reported on 10/25/2022   Leah Feliz PA-C       Medication history completed by:    Juanjose Sunshine CPhT  Medication [facility]

## 2023-03-21 NOTE — CONSULTS
Care Management Initial Consult    General Information  Assessment completed with: VM-chart review, Children, son Davie  Type of CM/SW Visit: Initial Assessment    Primary Care Provider verified and updated as needed:     Readmission within the last 30 days:        Reason for Consult: discharge planning  Advance Care Planning:            Communication Assessment  Patient's communication style: spoken language (English or Bilingual)    Hearing Difficulty or Deaf: yes   Wear Glasses or Blind: yes    Cognitive  Cognitive/Neuro/Behavioral: WDL                      Living Environment:   People in home: alone     Current living Arrangements: apartment      Able to return to prior arrangements: yes       Family/Social Support:  Care provided by: self, child(pamela)  Provides care for: no one  Marital Status:   Children          Description of Support System: Supportive, Involved         Current Resources:   Patient receiving home care services:       Community Resources:    Equipment currently used at home: grab bar, toilet, grab bar, tub/shower, wheelchair, manual  Supplies currently used at home:      Employment/Financial:  Employment Status:          Financial Concerns:             Lifestyle & Psychosocial Needs:  Social Determinants of Health     Tobacco Use: Medium Risk     Smoking Tobacco Use: Former     Smokeless Tobacco Use: Former     Passive Exposure: Not on file   Alcohol Use: Not on file   Financial Resource Strain: Not on file   Food Insecurity: Not on file   Transportation Needs: Not on file   Physical Activity: Not on file   Stress: Not on file   Social Connections: Not on file   Intimate Partner Violence: Not on file   Depression: Not at risk     PHQ-2 Score: 0   Housing Stability: Not on file       Functional Status:  Prior to admission patient needed assistance:              Mental Health Status:          Chemical Dependency Status:                Values/Beliefs:  Spiritual, Cultural Beliefs,  Orthodoxy Practices, Values that affect care:                 Additional Information:  Consult for discharge planning. Patient admitted on 3/21/23 for carpal tunnel syndrom of left wriste  and a tentative discharge date of 3/22/23.  Writer reviewed chart and TCU anticipated at discharge. Writer had received a call from [facility] regarding patient prior to discharge from [facility]. Writer attempted to call her back at [phone] but was unable to reach her. Per chart notes, patient's son Davie had toured [facility] in [facility] and would like a referral to be sent there. Referral sent via [facility].     Writer discussed transportation options and possible out of pocket costs of transport with patient. Patient would like transport at discharge and when reviewing insurance does not anticipate a bill for transport.       CARISSA Schuster

## 2023-03-21 NOTE — CONSULTS
[facility]  Consult Note - Hospitalist Service  Date of Admission:  3/21/2023  Consult Requested by: Dr. Hamilton  Reason for Consult: Post-op medical co-management    Assessment & Plan   Jumana Yang is a 82 year old female who is extremely hard of hearing (but able to lip read) with below PMHx admitted on 3/21/2023 by Ortho for carpal tunnel release. Hospitalist service was consulted for medical co-management     S/P left open carpal tunnel release (3/21/23): Surgery per Dr. Hamilton. MAC with local anesthesia used. EBL 2 ccs.   -- Defer routine post-operative cares, IVF, DVT prophylaxis and pain control to primary service   -- Analgesic regimen with PRN tylenol, PRN oxycodone 5--10 mg q4 hrs PRN   -- Treated with periop Ancef   -- Encourage pulmonary toilet; incentive spirometer at bedside   -- Bowel regimen in place while on narcotics   -- PT and OT in the AM   -- CBC and BMP in AM     Chronic pain  Severe spinal stenosis had steroid injections in her back, follows up with pain clinic  Arthritis  Neuropathy  - Resume PTA Lyrica, robaxin and prednisone 6 mg po every day   - Analgesic regimen as above     Bipolar 1 disorder  Depression  Anxiety: Resume PTA Buspar, Valium, Cymbalta, Atarax, Lamictal    Diabetes mellitus, non-insulin dependent: No recent A1C in chart   - Check A1C   - Holding PTA Glipizide, Jardiance, and Metformin  - Medium sliding scale insulin ordered   - BS per protocol   - Monitor for hypoglycemia     Hypertension  Chronic SAMMY: Hold PTA Dyazide, monitor BP trends and follow-up repeat BMP in the AM and resume accordingly.    Dyspepsia  GERD: Resume PPI      Chronic, stable medical conditions  Hard of hearing  COPD  Asthma  Irritable bowel syndrome  Meniere disease  Overactive bladder: Resume PTA Ditropan     The patient's care was discussed with the Attending Physician, Dr. Marie, Patient and Patient's Family.    Clinically Significant Risk Factors Present on Admission  "                      # Obesity: Estimated body mass index is 31.5 kg/m  as calculated from the following:    Height as of this encounter: 1.651 m (5' 5\").    Weight as of this encounter: 85.9 kg (189 lb 4.8 oz).           Erlinda Ruiz PA-C  Hospitalist Service  Securely message with [employer] (more info)  Text page via [facility] Paging/Directory   ______________________________________________________________________    Chief Complaint   S/P left open carpal tunnel release (3/21/23)    History is obtained from the patient    History of Present Illness   Jumana Yang is a 82 year old female who is extremely hard of hearing (but able to lip read) with below PMHx admitted on 3/21/2023 by Ortho for carpal tunnel release. Hospitalist service was consulted for medical co-management.    Seen post-op. Biggest concern is communication as she is extremely [place] (able to read lips). Pain escalating. Reviewed PTA meds. Reviewed pre-op H&P. No recent medication changes.     Past Medical History    Past Medical History:   Diagnosis Date     Abdominal pain      Anxiety      Arthritis      Asthma      Bipolar 1 disorder (H)      Chronic pain      Cochlear hydrops 01/01/1988    steriods and diazide     COPD (chronic obstructive pulmonary disease) (H)      Depression      Diabetes mellitus (H)      Dyspepsia      GERD (gastroesophageal reflux disease)      Hard of hearing     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     Hepatic abscess      Hyperlipidemia      Hypertension      Hypothyroidism      Irritable bowel syndrome      Meniere disease      Neuropathy      Noninfectious ileitis      Peritoneal abscess (H)      Spinal stenosis of lumbar region      Steroid long-term use      Vaginitis, atrophic, postmenopausal      Vitamin D deficiency        Past Surgical History   Past Surgical History:   Procedure Laterality Date     CATARACT IOL, RT/LT Left 7/2013     FOOT SURGERY      toe     GI SURGERY       IR " LUMBAR/SACRAL TRANSFOR INJ BILATERAL Bilateral 3/11/2022     LAPAROSCOPIC HEPATECTOMY PARTIAL  11/4/2013    Procedure: LAPAROSCOPIC HEPATECTOMY PARTIAL;  Laparoscopic Debridement of Liver Abcess;  Surgeon: Sepideh Green MD;  Location: [facility] OR     ORTHOPEDIC SURGERY       PICC INSERTION  8/28/2013    5fr DL Power PICC, 41cm (1cm external length) in the R lateral brachial vein with tip in the SVC RA junction.     RECTAL SURGERY      1970s     VASCULAR SURGERY         Medications   Medications Prior to Admission   Medication Sig Dispense Refill Last Dose     busPIRone (BUSPAR) 7.5 MG tablet Take 7.5 mg by mouth 3 times daily   3/20/2023 at pm     calamine 8-8 % lotion Apply topically every hour as needed for itching 118 mL 0 Unknown at prn     clobetasol (TEMOVATE) 0.05 % external cream Apply topically 2 times daily   3/20/2023 at pm     cyanocobalamin 1000 MCG SUBL Place 1,000 mcg under the tongue daily   3/20/2023 at am     diazepam (VALIUM) 2 MG tablet Take by mouth every 6 hours as needed for anxiety   Unknown at prn     DIAZEPAM 5 MG/5ML solution Take 0.5 mg by mouth 2 times daily as needed for anxiety   Unknown at prn     diphenoxylate-atropine (LOMOTIL) 2.5-0.025 MG tablet Take 1 tablet by mouth 4 times daily as needed for diarrhea        DULoxetine (CYMBALTA) 60 MG capsule Take 60 mg by mouth every morning   3/20/2023 at am     glipiZIDE (GLUCOTROL) 10 MG tablet Take 1 tablet (10 mg) by mouth 2 times daily (before meals)   3/20/2023 at pm     hydrOXYzine (ATARAX) 25 MG tablet Take 25 mg by mouth 3 times daily as needed for anxiety   Unknown at prn     JARDIANCE 25 MG TABS tablet Take 25 mg by mouth every morning   3/20/2023 at am     lamoTRIgine (LAMICTAL) 100 MG tablet Take 100 mg by mouth 2 times daily   3/20/2023 at pm     metFORMIN (GLUCOPHAGE) 500 MG tablet Take 500 mg by mouth 2 times daily (with meals)   3/20/2023 at pm     methocarbamol (ROBAXIN) 500 MG tablet Take 1 tablet (500 mg) by  mouth 3 times daily as needed for muscle spasms 60 tablet 0 Unknown at prn     miconazole (MICATIN) 2 % external powder Apply topically 2 times daily as needed To the skin fold   Unknown at prn     NONFORMULARY Take 1 tablet by mouth every morning MEDICATION NAME: Buffered Berberine 350 mg   3/20/2023 at am     omeprazole (PRILOSEC) 20 MG DR capsule Take 20 mg by mouth daily   3/20/2023 at am     oxybutynin ER (DITROPAN XL) 15 MG 24 hr tablet Take 15 mg by mouth every morning   3/20/2023 at am     oxyCODONE-acetaminophen (PERCOCET) 5-325 MG tablet Take 1 tablet by mouth every 6 hours as needed for severe pain (Max 4 tablets daily) 20 tablet 0 3/21/2023 at 0700     polyethylene glycol (MIRALAX) 17 g packet Take 1 packet by mouth daily as needed for constipation   Unknown at prn     predniSONE (DELTASONE) 1 MG tablet Take 1 mg by mouth every morning (In addition to the 5 mg dose; 1 mg + 5 mg = 6 mg)   3/20/2023 at am     predniSONE (DELTASONE) 5 MG tablet Take 5 mg by mouth every morning (In addition to the 1 mg dose; 5 mg + 1 mg = 6 mg)   3/20/2023 at am     pregabalin (LYRICA) 50 MG capsule Take 50 mg by mouth 2 times daily   3/20/2023 at pm     senna-docusate (SENOKOT-S/PERICOLACE) 8.6-50 MG tablet Take 1 tablet by mouth 2 times daily 30 tablet 0 Unknown at prn     triamcinolone (KENALOG) 0.1 % external cream Apply topically 2 times daily as needed   Unknown at prn     triamterene-HCTZ (DYAZIDE) 37.5-25 MG capsule Take by mouth every morning   3/20/2023 at am     vitamin D3 (CHOLECALCIFEROL) 50 mcg (2000 units) tablet Take 3 tablets by mouth daily   3/20/2023 at am     blood glucose (ACCU-CHEK FASTCLIX) lancing device FOR TESTING ONCE DAILY. DX  E11.9 TYPE 2 DIABETES        blood glucose (CONTOUR TEST) test strip TESTING EVERY DAY DX  E11.9        CONTOUR NEXT TEST test strip TESTING EVERY DAY DX  E11.9        order for DME Equipment being ordered: wrist brace (Patient not taking: Reported on 10/25/2022) 1 Device 0          Physical Exam   Vital Signs: Temp: 97.5  F (36.4  C) Temp src: Axillary BP: 130/54 Pulse: 77   Resp: 17 SpO2: 96 % O2 Device: None (Room air) Oxygen Delivery: 2 LPM  Weight: 189 lbs 4.8 oz    CONSTITUTIONAL: Pt laying in bed, dressed in hospital garb. Appears comfortable. Cooperative with interview. Accompanied by son and PCA.   HEENT: Normocephalic, atraumatic.   CARDIOVASCULAR: RRR, no murmurs, rubs, or extra heart sounds appreciated. Pulses +2/4 and regular in upper and lower extremities, bilaterally.   RESPIRATORY: No increased work of breathing.  CTA, bilat; no wheezes, rales, or rhonchi appreciated.  GASTROINTESTINAL:  Abdomen soft, non-distended. BS auscultated in all four quadrants. Negative for tenderness to palpation.  MUSCULOSKELETAL: L splint. No gross deformities noted. Normal muscle tone.   HEMATOLOGIC/LYMPHATIC/IMMUNOLOGIC: Minimal edema appreciated.   NEUROLOGIC: Alert and oriented to person, place, and time. No focal neuro deficits.   SKIN: Warm, dry, intact.     Medical Decision Making       55 MINUTES SPENT BY ME on the date of service doing chart review, history, exam, documentation & further activities per the note.      Data     I have personally reviewed the following data over the past 24 hrs:    N/A  \   N/A   / N/A     N/A N/A N/A /  102 (H)   3.4 N/A 0.89 \       Imaging results reviewed over the past 24 hrs:   No results found for this or any previous visit (from the past 24 hour(s)).

## 2023-03-21 NOTE — PLAN OF CARE
Goal Outcome Evaluation:    Patient transferred from PACU @ 1450. Very [place], left hearing aid in place, does lip read. Has not been OOB yet due to pain. Wheelchair bound for over a year  per patient and PCA report. Denies chest pain or SOB. Pain managed with oxycodone. Incontinent of bladder x1,  Purewick in place until pain well managed, encouraged to use BSC. Discharge pending.

## 2023-03-21 NOTE — BRIEF OP NOTE
[facility]    Brief Operative Note    Pre-operative diagnosis: Carpal tunnel syndrome of left wrist [G56.02]  Left ring trigger finger  Post-operative diagnosis Same as pre-operative diagnosis    Procedure: Procedure(s):  LEFT OPEN CARPAL TUNNEL RELEASE. LEFT RING FINGER A1 PULLEY RELEASE  Surgeon: Surgeon(s) and Role:     * Claire Hamilton MD - Primary   Diamond Fuentes PA-C  Anesthesia: MAC plus local  Estimated Blood Loss: 2 cc    Drains: None  Specimens: * No specimens in log *  Findings:   None.  Complications: None.  Implants: * No implants in log *      Admit  Elevate  Ice  Finger ROM  PT/OT

## 2023-03-21 NOTE — ANESTHESIA PREPROCEDURE EVALUATION
Anesthesia Pre-Procedure Evaluation    Patient: Jumana Yang   MRN: [id number] : 1940        Procedure : Procedure(s):  LEFT OPEN CARPAL TUNNEL RELEASE. LEFT LONG FINGER A1 PULLEY RELEASE.          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     Hepatic abscess      Hyperlipidemia      Hypertension      Hypothyroidism      Irritable bowel syndrome      Meniere disease      Neuropathy      Noninfectious ileitis      Peritoneal abscess (H)      Spinal stenosis of lumbar region      Steroid long-term use      Vaginitis, atrophic, postmenopausal      Vitamin D deficiency       Past Surgical History:   Procedure Laterality Date     CATARACT IOL, RT/LT Left 2013     FOOT SURGERY      toe     GI SURGERY       IR LUMBAR/SACRAL TRANSFOR INJ BILATERAL Bilateral 3/11/2022     LAPAROSCOPIC HEPATECTOMY PARTIAL  2013    Procedure: LAPAROSCOPIC HEPATECTOMY PARTIAL;  Laparoscopic Debridement of Liver Abcess;  Surgeon: Sepideh Green MD;  Location: [facility] OR     ORTHOPEDIC SURGERY       PICC INSERTION  2013    5fr DL Power PICC, 41cm (1cm external length) in the R lateral brachial vein with tip in the SVC RA junction.     RECTAL SURGERY      1970s     VASCULAR SURGERY        Allergies   Allergen Reactions     Propofol Nausea and Vomiting     Shellfish Allergy      Other reaction(s): Dizziness     Capsaicin Rash and Blisters      Social History     Tobacco Use     Smoking status: Former     Types: Cigarettes     Quit date: 11/15/1987     Years since quittin.3     Smokeless tobacco: Former   Substance Use Topics     Alcohol  use: Not Currently     Alcohol/week: 0.0 standard drinks     Comment: AA -dober since 9/131986      Wt Readings from Last 1 Encounters:   03/21/23 85.9 kg (189 lb 4.8 oz)        Anesthesia Evaluation            ROS/MED HX  ENT/Pulmonary:     (+) tobacco use, Past use, Intermittent, asthma  (-) sleep apnea   Neurologic: Comment: [place]      Cardiovascular:     (+) Dyslipidemia hypertension----- (-) murmur   METS/Exercise Tolerance:     Hematologic:     (+) anemia,     Musculoskeletal:       GI/Hepatic: Comment: H/O liver abcess    (+) GERD, liver disease,     Renal/Genitourinary:     (+) renal disease, type: CRI,     Endo:     (+) type II DM, Not using insulin, - not using insulin pump. thyroid problem, hypothyroidism, Obesity,     Psychiatric/Substance Use:     (+) psychiatric history anxiety and bipolar     Infectious Disease:       Malignancy:    (-) malignancy   Other:            Physical Exam    Airway        Mallampati: II   TM distance: > 3 FB   Neck ROM: full   Mouth opening: > 3 cm    Respiratory Devices and Support         Dental       (+) Edentulous      Cardiovascular          Rhythm and rate: regular (-) no murmur    Pulmonary   pulmonary exam normal                OUTSIDE LABS:  CBC:   Lab Results   Component Value Date    WBC 16.2 (H) 07/03/2022    WBC 8.8 06/16/2022    HGB 10.8 (L) 07/03/2022    HGB 10.2 (L) 06/16/2022    HCT 37.9 07/03/2022    HCT 36.9 06/16/2022     07/03/2022     06/16/2022     BMP:   Lab Results   Component Value Date     07/03/2022     06/16/2022    POTASSIUM 2.9 (L) 07/03/2022    POTASSIUM 3.9 06/16/2022    CHLORIDE 98 07/03/2022    CHLORIDE 103 06/16/2022    CO2 28 07/03/2022    CO2 27 06/16/2022    BUN 26 07/03/2022    BUN 20 06/16/2022    CR 0.95 07/03/2022    CR 0.90 06/16/2022     (H) 07/03/2022     (H) 06/16/2022     COAGS:   Lab Results   Component Value Date    PTT 29 05/26/2022    INR 1.02 06/16/2022     POC:   Lab Results    Component Value Date     (H) 10/26/2015     HEPATIC:   Lab Results   Component Value Date    ALBUMIN 3.8 07/03/2022    PROTTOTAL 6.9 07/03/2022    ALT 15 07/03/2022    AST 11 07/03/2022    ALKPHOS 69 07/03/2022    BILITOTAL 0.5 07/03/2022    ALTON 57 (H) 07/19/2021     OTHER:   Lab Results   Component Value Date    LACT 3.4 (H) 07/04/2022    A1C 8.8 (H) 03/08/2022    MACY 10.8 (H) 07/03/2022    PHOS 2.9 04/23/2022    MAG 1.8 05/26/2022    LIPASE 22 07/03/2022    TSH 0.68 08/15/2022    CRP 0.6 07/03/2022    SED 5 03/07/2022       Anesthesia Plan    ASA Status:  3   NPO Status:  NPO Appropriate    Anesthesia Type: MAC.   Induction: Intravenous, Propofol.           Consents    Anesthesia Plan(s) and associated risks, benefits, and realistic alternatives discussed. Questions answered and patient/representative(s) expressed understanding.    - Discussed:     - Discussed with:  Patient      - Extended Intubation/Ventilatory Support Discussed: No.      - Patient is DNR/DNI Status: No    Use of blood products discussed: No .     Postoperative Care    Pain management: IV analgesics.   PONV prophylaxis: Ondansetron (or other 5HT-3), Dexamethasone or Solumedrol     Comments:    Other Comments: Patient is counseled on the anesthesia plan and relevant anesthesia procedures including all risks and benefits. All patient questions were answered.     Patient has received propofol in the past without issues.             Arjun Parker MD

## 2023-03-21 NOTE — ANESTHESIA POSTPROCEDURE EVALUATION
Patient: Jumana Yang    Procedure: Procedure(s):  LEFT OPEN CARPAL TUNNEL RELEASE. LEFT RING FINGER A1 PULLEY RELEASE       Anesthesia Type:  MAC    Note:  Disposition: Outpatient   Postop Pain Control: Uneventful            Sign Out: Well controlled pain   PONV:    Neuro/Psych: Uneventful            Sign Out: Acceptable/Baseline neuro status   Airway/Respiratory: Uneventful            Sign Out: Acceptable/Baseline resp. status   CV/Hemodynamics: Uneventful            Sign Out: Acceptable CV status   Other NRE: NONE   DID A NON-ROUTINE EVENT OCCUR? No           Last vitals:  Vitals Value Taken Time   /53 03/21/23 1415   Temp 36.3  C (97.3  F) 03/21/23 1345   Pulse 76 03/21/23 1425   Resp 18 03/21/23 1425   SpO2 95 % 03/21/23 1424   Vitals shown include unvalidated device data.    Electronically Signed By: Arjun Parker MD  March 21, 2023  4:12 PM

## 2023-03-22 ENCOUNTER — APPOINTMENT (OUTPATIENT)
Dept: OCCUPATIONAL THERAPY | Facility: CLINIC | Age: 83
DRG: 042 | End: 2023-03-22
Attending: ORTHOPAEDIC SURGERY
Payer: MEDICARE

## 2023-03-22 LAB
ANION GAP SERPL CALCULATED.3IONS-SCNC: 7 MMOL/L (ref 7–15)
BUN SERPL-MCNC: 14.6 MG/DL (ref 8–23)
CALCIUM SERPL-MCNC: 9.4 MG/DL (ref 8.8–10.2)
CHLORIDE SERPL-SCNC: 103 MMOL/L (ref 98–107)
CREAT SERPL-MCNC: 0.95 MG/DL (ref 0.51–0.95)
DEPRECATED HCO3 PLAS-SCNC: 32 MMOL/L (ref 22–29)
GFR SERPL CREATININE-BSD FRML MDRD: 60 ML/MIN/1.73M2
GLUCOSE BLDC GLUCOMTR-MCNC: 156 MG/DL (ref 70–99)
GLUCOSE BLDC GLUCOMTR-MCNC: 220 MG/DL (ref 70–99)
GLUCOSE BLDC GLUCOMTR-MCNC: 249 MG/DL (ref 70–99)
GLUCOSE BLDC GLUCOMTR-MCNC: 277 MG/DL (ref 70–99)
GLUCOSE SERPL-MCNC: 164 MG/DL (ref 70–99)
HGB BLD-MCNC: 9.3 G/DL (ref 11.7–15.7)
POTASSIUM SERPL-SCNC: 3.6 MMOL/L (ref 3.4–5.3)
SODIUM SERPL-SCNC: 142 MMOL/L (ref 136–145)

## 2023-03-22 PROCEDURE — 250N000012 HC RX MED GY IP 250 OP 636 PS 637: Performed by: PHYSICIAN ASSISTANT

## 2023-03-22 PROCEDURE — 80048 BASIC METABOLIC PNL TOTAL CA: CPT | Performed by: PHYSICIAN ASSISTANT

## 2023-03-22 PROCEDURE — 97166 OT EVAL MOD COMPLEX 45 MIN: CPT | Mod: GO

## 2023-03-22 PROCEDURE — 99222 1ST HOSP IP/OBS MODERATE 55: CPT | Performed by: CLINICAL NURSE SPECIALIST

## 2023-03-22 PROCEDURE — 97110 THERAPEUTIC EXERCISES: CPT | Mod: GO

## 2023-03-22 PROCEDURE — 97530 THERAPEUTIC ACTIVITIES: CPT | Mod: GO

## 2023-03-22 PROCEDURE — 250N000013 HC RX MED GY IP 250 OP 250 PS 637: Performed by: CLINICAL NURSE SPECIALIST

## 2023-03-22 PROCEDURE — 250N000011 HC RX IP 250 OP 636: Performed by: STUDENT IN AN ORGANIZED HEALTH CARE EDUCATION/TRAINING PROGRAM

## 2023-03-22 PROCEDURE — 250N000013 HC RX MED GY IP 250 OP 250 PS 637: Performed by: ORTHOPAEDIC SURGERY

## 2023-03-22 PROCEDURE — 250N000013 HC RX MED GY IP 250 OP 250 PS 637: Performed by: PHYSICIAN ASSISTANT

## 2023-03-22 PROCEDURE — 120N000001 HC R&B MED SURG/OB

## 2023-03-22 PROCEDURE — 99232 SBSQ HOSP IP/OBS MODERATE 35: CPT | Performed by: HOSPITALIST

## 2023-03-22 PROCEDURE — 85018 HEMOGLOBIN: CPT | Performed by: ORTHOPAEDIC SURGERY

## 2023-03-22 PROCEDURE — 36415 COLL VENOUS BLD VENIPUNCTURE: CPT | Performed by: PHYSICIAN ASSISTANT

## 2023-03-22 RX ORDER — HYDROXYZINE HYDROCHLORIDE 10 MG/1
10-20 TABLET, FILM COATED ORAL 3 TIMES DAILY PRN
Status: DISCONTINUED | OUTPATIENT
Start: 2023-03-22 | End: 2023-03-25

## 2023-03-22 RX ORDER — ACETAMINOPHEN 325 MG/1
975 TABLET ORAL 3 TIMES DAILY
Status: DISCONTINUED | OUTPATIENT
Start: 2023-03-22 | End: 2023-03-27 | Stop reason: HOSPADM

## 2023-03-22 RX ORDER — ACETAMINOPHEN 325 MG/1
650 TABLET ORAL EVERY 4 HOURS PRN
Qty: 100 TABLET | Refills: 0 | DISCHARGE
Start: 2023-03-24 | End: 2024-01-28

## 2023-03-22 RX ORDER — OXYCODONE AND ACETAMINOPHEN 5; 325 MG/1; MG/1
1 TABLET ORAL EVERY 6 HOURS PRN
Qty: 20 TABLET | Refills: 0 | COMMUNITY
Start: 2023-03-22 | End: 2023-03-27

## 2023-03-22 RX ORDER — HYDROMORPHONE HYDROCHLORIDE 1 MG/ML
0.5 INJECTION, SOLUTION INTRAMUSCULAR; INTRAVENOUS; SUBCUTANEOUS EVERY 4 HOURS PRN
Status: COMPLETED | OUTPATIENT
Start: 2023-03-22 | End: 2023-03-22

## 2023-03-22 RX ORDER — DIAZEPAM 2 MG
2 TABLET ORAL EVERY 6 HOURS PRN
Qty: 10 TABLET | Refills: 0 | Status: ON HOLD | OUTPATIENT
Start: 2023-03-22 | End: 2023-04-01

## 2023-03-22 RX ORDER — OXYCODONE HYDROCHLORIDE 5 MG/1
5-10 TABLET ORAL EVERY 4 HOURS PRN
Qty: 20 TABLET | Refills: 0 | Status: SHIPPED | OUTPATIENT
Start: 2023-03-22 | End: 2023-03-23

## 2023-03-22 RX ORDER — ACETAMINOPHEN 325 MG/1
650 TABLET ORAL EVERY 4 HOURS PRN
Status: DISCONTINUED | OUTPATIENT
Start: 2023-03-22 | End: 2023-03-27 | Stop reason: HOSPADM

## 2023-03-22 RX ORDER — OXYCODONE HYDROCHLORIDE 5 MG/1
10 TABLET ORAL
Status: DISCONTINUED | OUTPATIENT
Start: 2023-03-22 | End: 2023-03-27 | Stop reason: HOSPADM

## 2023-03-22 RX ORDER — OXYCODONE HYDROCHLORIDE 5 MG/1
5 TABLET ORAL
Status: DISCONTINUED | OUTPATIENT
Start: 2023-03-22 | End: 2023-03-27 | Stop reason: HOSPADM

## 2023-03-22 RX ADMIN — OXYCODONE HYDROCHLORIDE 5 MG: 5 TABLET ORAL at 16:33

## 2023-03-22 RX ADMIN — PREGABALIN 50 MG: 50 CAPSULE ORAL at 20:28

## 2023-03-22 RX ADMIN — PREDNISONE 1 MG: 1 TABLET ORAL at 09:26

## 2023-03-22 RX ADMIN — LAMOTRIGINE 100 MG: 100 TABLET ORAL at 20:28

## 2023-03-22 RX ADMIN — DULOXETINE HYDROCHLORIDE 60 MG: 60 CAPSULE, DELAYED RELEASE ORAL at 09:24

## 2023-03-22 RX ADMIN — OXYCODONE HYDROCHLORIDE 10 MG: 5 TABLET ORAL at 09:23

## 2023-03-22 RX ADMIN — LAMOTRIGINE 100 MG: 100 TABLET ORAL at 09:25

## 2023-03-22 RX ADMIN — DIAZEPAM 2 MG: 2 TABLET ORAL at 08:14

## 2023-03-22 RX ADMIN — PREGABALIN 50 MG: 50 CAPSULE ORAL at 09:25

## 2023-03-22 RX ADMIN — BUSPIRONE HYDROCHLORIDE 7.5 MG: 5 TABLET ORAL at 09:24

## 2023-03-22 RX ADMIN — ACETAMINOPHEN 650 MG: 325 TABLET, FILM COATED ORAL at 16:33

## 2023-03-22 RX ADMIN — INSULIN ASPART 4 UNITS: 100 INJECTION, SOLUTION INTRAVENOUS; SUBCUTANEOUS at 18:50

## 2023-03-22 RX ADMIN — HYDROMORPHONE HYDROCHLORIDE 0.5 MG: 1 INJECTION, SOLUTION INTRAMUSCULAR; INTRAVENOUS; SUBCUTANEOUS at 03:53

## 2023-03-22 RX ADMIN — METHOCARBAMOL 500 MG: 500 TABLET ORAL at 23:58

## 2023-03-22 RX ADMIN — PREDNISONE 5 MG: 5 TABLET ORAL at 09:26

## 2023-03-22 RX ADMIN — PANTOPRAZOLE SODIUM 40 MG: 40 TABLET, DELAYED RELEASE ORAL at 08:14

## 2023-03-22 RX ADMIN — HYDROXYZINE HYDROCHLORIDE 25 MG: 25 TABLET, FILM COATED ORAL at 11:36

## 2023-03-22 RX ADMIN — OXYCODONE HYDROCHLORIDE 10 MG: 5 TABLET ORAL at 12:47

## 2023-03-22 RX ADMIN — ACETAMINOPHEN 650 MG: 325 TABLET, FILM COATED ORAL at 12:48

## 2023-03-22 RX ADMIN — INSULIN ASPART 1 UNITS: 100 INJECTION, SOLUTION INTRAVENOUS; SUBCUTANEOUS at 09:25

## 2023-03-22 RX ADMIN — METHOCARBAMOL 500 MG: 500 TABLET ORAL at 09:25

## 2023-03-22 RX ADMIN — BUSPIRONE HYDROCHLORIDE 7.5 MG: 5 TABLET ORAL at 16:33

## 2023-03-22 RX ADMIN — METHOCARBAMOL 500 MG: 500 TABLET ORAL at 01:10

## 2023-03-22 RX ADMIN — POLYETHYLENE GLYCOL 3350 17 G: 17 POWDER, FOR SOLUTION ORAL at 09:25

## 2023-03-22 RX ADMIN — OXYBUTYNIN CHLORIDE 15 MG: 10 TABLET, EXTENDED RELEASE ORAL at 09:23

## 2023-03-22 RX ADMIN — ACETAMINOPHEN 975 MG: 325 TABLET, FILM COATED ORAL at 23:39

## 2023-03-22 RX ADMIN — BUSPIRONE HYDROCHLORIDE 7.5 MG: 5 TABLET ORAL at 23:40

## 2023-03-22 RX ADMIN — HYDROMORPHONE HYDROCHLORIDE 0.5 MG: 1 INJECTION, SOLUTION INTRAMUSCULAR; INTRAVENOUS; SUBCUTANEOUS at 08:03

## 2023-03-22 RX ADMIN — SENNOSIDES AND DOCUSATE SODIUM 1 TABLET: 50; 8.6 TABLET ORAL at 20:28

## 2023-03-22 RX ADMIN — OXYCODONE HYDROCHLORIDE 10 MG: 5 TABLET ORAL at 20:23

## 2023-03-22 RX ADMIN — INSULIN ASPART 3 UNITS: 100 INJECTION, SOLUTION INTRAVENOUS; SUBCUTANEOUS at 13:51

## 2023-03-22 RX ADMIN — METHOCARBAMOL 500 MG: 500 TABLET ORAL at 16:34

## 2023-03-22 RX ADMIN — SENNOSIDES AND DOCUSATE SODIUM 1 TABLET: 50; 8.6 TABLET ORAL at 09:25

## 2023-03-22 ASSESSMENT — ACTIVITIES OF DAILY LIVING (ADL)
ADLS_ACUITY_SCORE: 38
ADLS_ACUITY_SCORE: 42
ADLS_ACUITY_SCORE: 38
ADLS_ACUITY_SCORE: 42
ADLS_ACUITY_SCORE: 38
ADLS_ACUITY_SCORE: 42
ADLS_ACUITY_SCORE: 38
ADLS_ACUITY_SCORE: 42
ADLS_ACUITY_SCORE: 38

## 2023-03-22 NOTE — PROGRESS NOTES
"Orthopedic Surgery  Jumana Yang  3/22/2023  Admit Date:  3/21/2023  POD 1 Day Post-Op  S/P Procedure(s):  LEFT OPEN CARPAL TUNNEL RELEASE. LEFT RING FINGER A1 PULLEY RELEASE    Patient is very anxious and tearful because her pain is not controlled.  She rates her pain \"way over 10/10.\"  She has chronic pain and was taking about 5 tabs daily of Percocet PTA.  She also says she has not been able to eat because she has diabetes and the food they brought her she couldn't eat.    Alert and orient to person, place, and time.  Hard of hearing, reading lips.  Distraught and tearful due to pain    Vital Sign Ranges  Temperature Temp  Av.8  F (36.6  C)  Min: 97.2  F (36.2  C)  Max: 98.6  F (37  C)   Blood pressure Systolic (24hrs), Av , Min:107 , Max:147        Diastolic (24hrs), Av, Min:47, Max:86      Pulse Pulse  Av.9  Min: 68  Max: 86   Respirations Resp  Av  Min: 11  Max: 18   Pulse oximetry SpO2  Av.5 %  Min: 93 %  Max: 100 %       Dressing is clean, dry, and intact. Minimal erythema of the surrounding skin.  Able to wiggle fingers  L upper extremity is NVI.    Labs:  Recent Labs   Lab Test 23  1025 22  2302   POTASSIUM 3.4 2.9* 3.9     Recent Labs   Lab Test 23  0753 22  2302   HGB 9.3* 10.8* 10.2*     Recent Labs   Lab Test 22  0000   INR 1.02 1.01 0.9     Recent Labs   Lab Test 22    326 385       A/P  1. S/p L CTR and L ring finger A1 pulley release   Mobilize with PT/OT.     Continue to elevate and ice   Pain consult placed to help with pain management.  For now, oxycodone frequency increased to q3h    2. Disposition   Anticipate d/c to TCU when pain controlled on oral medications. SW following, appreciate assistance    Jenise Sawyer PA-C     "

## 2023-03-22 NOTE — PROGRESS NOTES
[facility]  Hospitalist Progress Note        Kalen Soares MD   03/22/2023        Interval History:        - Patient reports uncontrolled pain and is tearful; son present in room  - orthopedics following as primary and has increased oxycodone frequency to q3 hours and ordered for a pain management team consult  - therapy rec TCU         Assessment and Plan:        Jumana Yang is a 82 year old female who is extremely hard of hearing (but able to lip read) with PMHx chronic pain, h/o severe spinal stenosis (follows pain clinic), anxiety, depression, bipolar, diabetes mellitus type II, hypertension, COPD, asthma, irritable bowel syndrome who was admitted on 3/21/2023 by Ortho for carpal tunnel release. Hospitalist service was consulted for medical co-management      S/P left open carpal tunnel release (3/21/23):   - Surgery per Dr. Hamilton. MAC with local anesthesia used. EBL 2 ccs.   -- Defer routine post-operative cares, IVF, DVT prophylaxis and pain control to primary service   - reports uncontrolled pain and is tearful  - orthopedics following as primary and has increased oxycodone frequency to q3 hours and ordered for a pain management team consult  -- Treated with periop Ancef   -- Encourage pulmonary toilet; incentive spirometer at bedside   -- Bowel regimen in place while on narcotics   - therapy rec TCU  - post op Hb 9.3; monitor hemoglobin    Chronic pain  Severe spinal stenosis had steroid injections in her back, follows up with pain clinic  Arthritis  Neuropathy  -patient's PTA regimen includes Robaxin 500 mg TID PRN , Percocet 1 tablet Q6 hours PRN, prednisone 6 mg daily, Lyrica 50 mg BID  -continue PTA Lyrica,  prednisone 6 mg po every day and prn Robaxin  -plan for narcotic pain regimen for postop pain as noted above     Bipolar 1 disorder  Depression  Anxiety:   - continue PTA Buspar, Valium, Cymbalta, Atarax, Lamictal     Diabetes mellitus, non-insulin dependent: a1c 6.9  "(3/21/23 )   - Holding PTA Glipizide, Jardiance, and Metformin  - continue sliding scale insulin with hypoglycemia protocol  - given concomitant prednisone use, will switch sliding scale insulin to high resistance     Hypertension  Chronic SAMMY:   - holding PTA Dyazide; BP stable; will continue to hold for now and will plan to resume as needed     Dyspepsia  GERD: continue Protonix       Chronic, stable medical conditions  Hard of hearing  COPD  Asthma  Irritable bowel syndrome  Meniere disease  Overactive bladder: continue PTA Ditropan    Diet: Advance Diet as Tolerated: Regular Diet Adult         DVT Prophylaxis: per primary; PCDs    Code status: full code    Disposition: per orthopedics primary    Clinically Significant Risk Factors Present on Admission                      # DMII: A1C = 6.9 % (Ref range: <5.7 %) within past 6 months    # Obesity: Estimated body mass index is 31.5 kg/m  as calculated from the following:    Height as of this encounter: 1.651 m (5' 5\").    Weight as of this encounter: 85.9 kg (189 lb 4.8 oz).              Page Me (7 am to 6 pm)    Care plan discussed with nursing, patient and her son present in room              Physical Exam:      Blood pressure 130/47, pulse 79, temperature 98.6  F (37  C), temperature source Oral, resp. rate 16, height 1.651 m (5' 5\"), weight 85.9 kg (189 lb 4.8 oz), SpO2 93 %, not currently breastfeeding.  Vitals:    03/21/23 1024   Weight: 85.9 kg (189 lb 4.8 oz)     Vital Signs with Ranges  Temp:  [97.2  F (36.2  C)-98.6  F (37  C)] 98.6  F (37  C)  Pulse:  [68-86] 79  Resp:  [11-18] 16  BP: (107-147)/(47-86) 130/47  SpO2:  [93 %-100 %] 93 %  I/O's Last 24 hours  I/O last 3 completed shifts:  In: 860 [P.O.:360; I.V.:500]  Out: 2 [Blood:2]    Constitutional: Alert, awake and oriented ; very hard of hearing (able to lip read); tearful       Oral cavity: Moist mucosa   Cardiovascular: Normal s1 s2, regular rate and rhythm, no murmur   Lungs: B/l clear to " auscultation, no wheezes or crepitations   Abdomen: Soft, nt, nd, no guarding, rigidity or rebound; BS +   LE : No edema   Musculoskeletal/Neuro Power 5/5 in all extremities; left arm in postop bandage /splint ; no focal neurological deficits noted   Psychiatry: tearful                Medications:          busPIRone  7.5 mg Oral TID     DULoxetine  60 mg Oral QAM     insulin aspart  1-7 Units Subcutaneous TID AC     insulin aspart  1-5 Units Subcutaneous At Bedtime     lamoTRIgine  100 mg Oral BID     oxybutynin ER  15 mg Oral QAM     pantoprazole  40 mg Oral QAM AC     polyethylene glycol  17 g Oral Daily     predniSONE  1 mg Oral QAM     predniSONE  5 mg Oral QAM     pregabalin  50 mg Oral BID     senna-docusate  1 tablet Oral BID     sodium chloride (PF)  3 mL Intracatheter Q8H     PRN Meds: [START ON 3/24/2023] acetaminophen, sore throat, bisacodyl, glucose **OR** dextrose **OR** glucagon, diazepam, HYDROmorphone, hydrOXYzine, lidocaine 4%, lidocaine (buffered or not buffered), magnesium hydroxide, methocarbamol, naloxone **OR** naloxone **OR** naloxone **OR** naloxone, ondansetron **OR** ondansetron, oxyCODONE **OR** oxyCODONE, prochlorperazine **OR** prochlorperazine, sodium chloride (PF)         Data:      All new lab and imaging data was reviewed.   Recent Labs   Lab Test 07/03/22 2204 06/16/22  2302 05/26/22  2045 02/14/17  1541 09/06/16  0000   WBC 16.2* 8.8 9.5   < >  --    HGB 10.8* 10.2* 10.3*   < >  --    MCV 81 84 81   < >  --     326 385   < >  --    INR  --  1.02 1.01  --  0.9    < > = values in this interval not displayed.      Recent Labs   Lab Test 03/21/23  2140 03/21/23  1710 03/21/23  1025 07/03/22  2204 06/16/22  2302 05/30/22  0815 05/30/22  0514   NA  --   --   --  143 144  --  145   POTASSIUM  --   --  3.4 2.9* 3.9   < > 3.4*   CHLORIDE  --   --   --  98 103  --  100   CO2  --   --   --  28 27  --  32*   BUN  --   --   --  26 20  --  22   CR  --   --  0.89 0.95 0.90  --  0.79    ANIONGAP  --   --   --  17 14  --  13   MACY  --   --   --  10.8* 10.0  --  9.7   * 114* 102* 128* 135*   < > 105    < > = values in this interval not displayed.     No lab results found.    Invalid input(s): TROP, TROPONINIES

## 2023-03-22 NOTE — PLAN OF CARE
Goal Outcome Evaluation:      Summary:  Jumana Yang is a 82 year old female who is extremely hard of hearing (but able to lip read) with below PMHx admitted on 3/21/2023 by Ortho for carpal tunnel release     Pt A&O x4, VSS RA,Very [place], left hearing aid in place, does lip read(Special mask is by the door). Has not been OOB yet due to pain. Wheelchair bound for over a year  per patient and PCA report. Denies chest pain or SOB. C/o Pain on L. Wrist, PO oxy and Robaxin given without relief, mariza GOMEZ,IV Dilaudid given x1. Incontinent of bladder, Purewick in place until pain well managed, encouraged to use BSC. Pt has h/o Severe spinal stenosis had steroid injections in her back, depression, Anxiety. Discharge pending improvement

## 2023-03-22 NOTE — PLAN OF CARE
Goal Outcome Evaluation:    POD 0 from a carpel tunnel surgery on L wrist. A&Ox4, but confused and forgetful. CMS intact. Bowel sounds active, passing flatus, tolerating regualr diet. VSS on RA. Dressing C/D/I. Up with lift. C/o severe pain, decreased with oxy, see MAR. Valium given x1 for anxiety. Plan TBD. Would recommend psych to come see pt. Pt was teary eyed, repeated requests, yelling at staff, etc and then laughed and turned happy within seconds. Has hx of bipolar. Does not want male staff changing her or seeing her naked.

## 2023-03-22 NOTE — CONSULTS
"[facility] ACUTE PAIN SERVICE    ([facility], [facility], [facility])   Daily PAIN Progress Note      Acute Pain Consult:  Jenise Sawyer PA-C  Reason for Consult: uncontrolled pain S/p CTR, chronic pain on 5 tabs percocet daily PTA      Assessment/Plan:  Jumana Yang is a 82 year old female who was admitted on 3/21/2023.  1 Day Post-Op LEFT OPEN CARPAL TUNNEL RELEASE. LEFT RING FINGER A1 PULLEY RELEASE  Pain Service is asked to see the patient for poorly controlled post operative pain,patient with opioid tolerance takes 5 percocet's daily. Admitted for planned procedure. History of hard of hearing, can lip read, Chronic pain  Severe spinal stenosis had steroid injections in her back, follows up with pain clinic, Arthritis, Neuropathy, Type II Diabetes, HTN, Bipolar disorder, Depression Hypertension,GERD     Patient stated pain was pretty bad overnight last PM, still persists but overall better this afternoon.  Somewhat sedated from the oxycodone. began when  and has persisted for  days. Describes pain as sharp, intense mostly in wrist area.  Currently a \"6\"/10. The patient does not smoke and no chemical dependency history.     MN  demonstrated  prescriptions for lyrica recently increased from 50 mg 180/90 days   Diazepam 2 mg 20 for 10 days  Oxycodone/APAP 5/325 mg tablets 120/30 days (4 tablets per day)    Over past 24 hours patient received 2(0.5mg) IV hydromorphone and 4(10mg) and 1(5mg) oxycodone MME of around 87.5 mg    PLAN:   1) Pain is consistent with acute post operative pain with history of chronic pain. Post operative pain improving agree with current pain management plan.   2)Multimodal Medication Therapy  Topical: none has brace on  NSAID'S: daily Prednisone 1 mg every AM  Muscle Relaxants: Robaxin 500 mg tid prn  Adjuvants: lyrica 50mg bid, vistaril 10-20 mg tid prn   Antidepressants/anxiolytics: Buspar 7.5 mg tid, cymbalta 60 mg every AM, lamictal 100 mg bid  Opioids: oxycodone " "5-10 mg every 3 hours prn   IV Pain medication: none indicated  3)Non-medication interventions  4)Constipation Prophylaxis  Senna-docusate and miralax daily  5) Follow up   -Opioid prescriber has been Ananya Mclean CNP Primary Care Provider  -Discharge Recommendations - We recommend prescribing the following at the time of discharge: oxyocodone/APAP 5/325 mg tablets 1-2 qid prn for 3-5 days then resume home dose which is 5/325 oxycodone/APAP up to 5 tablets/day       Principal Problem:    Carpal tunnel syndrome of left wrist     LOS: 1 day       Subjective:  Patient reports pain is improving, still quite tender to wrist area and to movement. Currently rates it a \"5\"      busPIRone  7.5 mg Oral TID     DULoxetine  60 mg Oral QAM     insulin aspart  1-10 Units Subcutaneous TID AC     insulin aspart  1-7 Units Subcutaneous At Bedtime     lamoTRIgine  100 mg Oral BID     oxybutynin ER  15 mg Oral QAM     pantoprazole  40 mg Oral QAM AC     polyethylene glycol  17 g Oral Daily     predniSONE  1 mg Oral QAM     predniSONE  5 mg Oral QAM     pregabalin  50 mg Oral BID     senna-docusate  1 tablet Oral BID     sodium chloride (PF)  3 mL Intracatheter Q8H       Objective:  Vital signs in last 24 hours:  Temp:  [98  F (36.7  C)-100.6  F (38.1  C)] 98  F (36.7  C)  Pulse:  [51-95] 51  Resp:  [16] 16  BP: (107-143)/(47-86) 113/51  SpO2:  [80 %-95 %] 92 %  Weight:   Weight change:   Body mass index is 31.5 kg/m .    Intake/Output last 3 shifts:  I/O last 3 completed shifts:  In: 360 [P.O.:360]  Out: -   Intake/Output this shift:  No intake/output data recorded.    Review of Systems:   As per subjective, all others negative.    Physical Exam:    General Appearance:  Sitting up in bed, pleasant, hard of hearing lip reads, mildly sedated   Head:  Normocephalic, without obvious abnormality, atraumatic   Eyes:  PERRL, conjunctiva/corneas clear, EOM's intact   Nose: Nares normal, septum midline, mucosa normal, no drainage "   Throat: Lips, mucosa, and tongue normal; teeth and gums normal   Neck: Supple, symmetrical, trachea midline   Back:   Symmetric, no curvature, ROM normal, no CVA tenderness   Lungs:   respirations unlabored   Abdomen:   Soft, non-tender, non distended   Extremities: Left hand/wrist with brace, fingers with some swelling tender, decreased mobility   Skin: Skin warm,normal    Neurologic:  oriented X 3, decreased mobility left arm due to pain          Imaging:  Personally Reviewed.  No results found.    Lab Results:  Personally Reviewed.   Recent Labs   Lab 03/22/23  0753   HGB 9.3*     Recent Labs   Lab 03/22/23  0753      CO2 32*   BUN 14.6     No results for input(s): INR in the last 168 hours.      MANAGEMENT DISCUSSED with the following over the past 24 hours: RN   NOTE(S)/MEDICAL RECORDS REVIEWED over the past 24 hours: Ortho, Hospital Medicine Consult, H&P and progress notes reviewed  Medical complexity over the past 24 hours:  - Prescription DRUG MANAGEMENT performed      Kathleen ROMANO, CNS-BC, DNP  Acute Care Pain Management Program  [facility] ([facility])   With questions call [phone]  Preference if for [employer] Neel Nuñez  Click HERE to page [place]

## 2023-03-22 NOTE — PROGRESS NOTES
"   03/22/23 1000   Appointment Info   Signing Clinician's Name / Credentials (OT) Nohelia Becerra, OTR/L   Living Environment   People in Home alone   Current Living Arrangements apartment   Home Accessibility no concerns   Transportation Anticipated public transportation;health plan transportation;family or friend will provide  ([employer])   Living Environment Comments Walk-in shower w/3\" lip, handicapped ht toilet   Self-Care   Usual Activity Tolerance fair   Current Activity Tolerance poor   Regular Exercise No   Equipment Currently Used at Home grab bar, toilet;grab bar, tub/shower;tub bench;walker, rolling;wheelchair, manual  (owns both 4ww and FWW)   Fall history within last six months yes   Number of times patient has fallen within last six months 5  (see note below)   Activity/Exercise/Self-Care Comment Per son: pt's falls at least 6mos ago when using walker to amb. Since then relies on wheelchair only for mob and no further falls since then.  At baseline prior to onset of L wrist issue and surgery, pt indep with wc transfers, dressing, toileting, bathing and meal prep. She has PCA 2-3X week for household tasks. She is indep with meds and finances but does not drive.   General Information   Onset of Illness/Injury or Date of Surgery 03/21/23   Referring Physician Dr. Claire Hamilton   Patient/Family Therapy Goal Statement (OT) pt plans TCU stay with therapies until regains indep for return home   Additional Occupational Profile Info/Pertinent History of Current Problem POD #1 L open carpal tunnel release. PMH shows multiple co-morbidities including Meniere's ds, DM w/neuropathy, chorinic pain, severe spinal stenosis (gets injections), Bipolar do, anxiety, depression, COPD, severe hearing loss (R ear complete loss, L ear 16% avail).   Existing Precautions/Restrictions fall;weight bearing;other (see comments)  (L hand/wrist NWB, 2# limit, can type, write and do light activity with hand/fingers. Encourage full " fist and finger/thumb movement. Per Jenise Sawyer pt can actively range L elbow. Per MD order, OT to teach L ajay Codman's.)   Limitations/Impairments hearing   Left Upper Extremity (Weight-bearing Status) non weight-bearing (NWB)  (through left hand/wrist)   Cognitive Status Examination   Orientation Status other (see comments);person   Behavioral Issues difficulty managing stress;overwhelmed easily;other (see comments)   Affect/Mental Status (Cognitive) anxious   Follows Commands follows one-step commands;over 90% accuracy   Cognitive Status Comments Pt's hearing loss affects communication - no see-through masks avail for her to read therapist's lips so son interpreted. Pt c/o pain throughout, focused on pain and limitations, difficult to keep her on task.   Visual Perception   Visual Impairment/Limitations WFL   Sensory   Sensory Comments Difficult to assess d/t pt's resistance to touch however she reports feeling pain with any movement of finger or thumb on L hand describing it as shooting up all the way to shoulder.   Range of Motion Comprehensive   Comment, General Range of Motion Unaffected, dominant RUE: ajay AROM flex approx 95% then c/o pain. Unable to tolerate any flex of L ajay. Able to tolerate actively flex L elbow approx 20o.   Coordination   Coordination Comments Able to use unaffected R dominant hand functionally; can not use L hand as assist   Bed Mobility   Comment (Bed Mobility) Max A to roll side to side citing pain with any movement.   Transfers   Transfer Comments Refused trial of supine to sit or wc transfers d/t pain. Needs further assessment once pain control improved   Activities of Daily Living   BADL Assessment/Intervention feeding;other (see comments)  (Currently dependent with toileting (purewick, no BM as yet post-op))   Eating/Self Feeding   Position (Self-Feeding) sitting up in bed   [place] (Feeding) set up   Comment, (Feeding) Needs food cut-up, items opened   Clinical  Impression   Criteria for Skilled Therapeutic Interventions Met (OT) Yes, treatment indicated   OT Diagnosis decline in ADL, IADL performance   OT Problem List-Impairments impacting ADL problems related to;activity tolerance impaired;balance;fear & anxiety;hearing;mobility;range of motion (ROM);strength;pain;post-surgical precautions   Assessment of Occupational Performance 5 or more Performance Deficits   Identified Performance Deficits functional mobility, dressing, toileting, bathing, grooming, meal prep   Planned Therapy Interventions (OT) ADL retraining;IADL retraining;transfer training;home program guidelines   Clinical Decision Making Complexity (OT) moderate complexity   Risk & Benefits of therapy have been explained evaluation/treatment results reviewed;care plan/treatment goals reviewed;risks/benefits reviewed;current/potential barriers reviewed;participants included;participants voiced agreement with care plan;patient;spouse/significant other;son   OT Total Evaluation Time   OT Eval, Moderate Complexity Minutes (29843) 25  (Includes call/consult with PA to clarify activity/HEP orders)   OT Goals   Therapy Frequency (OT) Daily   OT Predicted Duration/Target Date for Goal Attainment 03/25/23   OT Goals OT Goal 1;Transfers   OT: Transfer Moderate assist  (of 1 wheelchair transfers)   OT: Goal 1 Patient will tolerate/perform post-op exercise/HEP per orders with Min A of 1 to improve function in LUE/hand for eventual return to indep ADL.   Interventions   Interventions Quick Adds Self-Care/Home Management;Therapeutic Procedures/Exercise;Therapeutic Activity   Therapeutic Procedures/Exercise   Therapeutic Procedure: strength, endurance, ROM, flexibillity minutes (50065) 24   Symptoms Noted During/After Treatment increased pain   Treatment Detail/Skilled Intervention OT: following clarification (phone consult) of post-op ex/HEP with PA (Jenise Sawyer) educated patient and son in exercises. As patient has  significant hearing loss see-through masks have been used as she reads lips however none avail on floor. Therefore, son provided interpretation. Pt required much reassurance and redirection to task and c/o significant pain with all attempts at movement. Eventually she was able to make small rotations of thumb and move 2nd thru 4th digits through partial flex/ext. Could not move 5th digit nor would attempt further flex of any digits siting sig pain when doing so (which she felt up to shoulder). Modeling each exercise pt eventually did agree to attempt elb flex and achieved approx 20o completing approx 4 reps very slowly with rest breaks between each then refused to do more d/t pain.   Therapeutic Activities   Therapeutic Activity Minutes (18294) 10   Symptoms noted during/after treatment increased pain   Treatment Detail/Skilled Intervention OT: encouraged EOB sit w/goal of attempting Codman's while seated (pt will not be able to do in standing - wc bound at baseline and does sliding pivot transfers). Peformed supine to sidelying with max A of 2 to R side however pt yelled out in pain and refused to attempt to come to sit. Positioned her back in supine with LUE/hand elevated and placed ice pack on wrist.   OT Discharge Planning   OT Plan OT: Attempt EOB sit to R side and attempt to teach Codman's (L ajay) in sitting. Also if tolerates attempt wc transfer (does sliding pivot transfers at baseline). Also: mostly focus on L thumb/finger exercises w/goal of making fist (fist pumps), also elb ROM.   OT Discharge Recommendation (DC Rec) Transitional Care Facility   OT Rationale for DC Rec Pt lives alone, is wheelchair bound however is usually indep with transfers and basic self-cares. Currently she requires Max Aof 2 with bed mob and is not yet tolerating transfers, is Max A of 1-2 with all cares in bed. Will benefit from skilled therapy interventions in TCU setting to advance ADL, IADL and mobilities for eventual return  home.   OT Brief overview of current status Pt limited by pain, anxiety. Max A of 1-2 to roll side to side in bed, dep toileting, tolerating minimal movement of L thumb/fingers and unable to tolerate seated Codman's as  yet as ordered.

## 2023-03-22 NOTE — PROVIDER NOTIFICATION
MD Notification    Notified Person: MD    Notified Person Name: DEMETRICE HERRERA MD    Notification Date/Time:3/22/23 0232     Notification Interaction:[employer]    Purpose of Notification:2316-1 c/o pain and PRN OXY and ROBOXIN given without a relief, can she get something stronger for pain/  Thanks  Brock RN [id number]    Orders Received:    Comments:

## 2023-03-23 ENCOUNTER — APPOINTMENT (OUTPATIENT)
Dept: OCCUPATIONAL THERAPY | Facility: CLINIC | Age: 83
DRG: 042 | End: 2023-03-23
Attending: ORTHOPAEDIC SURGERY
Payer: MEDICARE

## 2023-03-23 LAB
GLUCOSE BLDC GLUCOMTR-MCNC: 171 MG/DL (ref 70–99)
GLUCOSE BLDC GLUCOMTR-MCNC: 179 MG/DL (ref 70–99)
GLUCOSE BLDC GLUCOMTR-MCNC: 195 MG/DL (ref 70–99)
GLUCOSE BLDC GLUCOMTR-MCNC: 195 MG/DL (ref 70–99)
GLUCOSE BLDC GLUCOMTR-MCNC: 223 MG/DL (ref 70–99)
GLUCOSE BLDC GLUCOMTR-MCNC: 274 MG/DL (ref 70–99)
HGB BLD-MCNC: 9 G/DL (ref 11.7–15.7)

## 2023-03-23 PROCEDURE — 250N000013 HC RX MED GY IP 250 OP 250 PS 637: Performed by: CLINICAL NURSE SPECIALIST

## 2023-03-23 PROCEDURE — 99231 SBSQ HOSP IP/OBS SF/LOW 25: CPT | Performed by: HOSPITALIST

## 2023-03-23 PROCEDURE — 36415 COLL VENOUS BLD VENIPUNCTURE: CPT | Performed by: ORTHOPAEDIC SURGERY

## 2023-03-23 PROCEDURE — 250N000012 HC RX MED GY IP 250 OP 636 PS 637: Performed by: PHYSICIAN ASSISTANT

## 2023-03-23 PROCEDURE — 120N000001 HC R&B MED SURG/OB

## 2023-03-23 PROCEDURE — 85018 HEMOGLOBIN: CPT | Performed by: ORTHOPAEDIC SURGERY

## 2023-03-23 PROCEDURE — 250N000013 HC RX MED GY IP 250 OP 250 PS 637: Performed by: PHYSICIAN ASSISTANT

## 2023-03-23 PROCEDURE — 97535 SELF CARE MNGMENT TRAINING: CPT | Mod: GO | Performed by: OCCUPATIONAL THERAPIST

## 2023-03-23 PROCEDURE — 250N000013 HC RX MED GY IP 250 OP 250 PS 637: Performed by: ORTHOPAEDIC SURGERY

## 2023-03-23 RX ORDER — OXYCODONE HYDROCHLORIDE 5 MG/1
5-10 TABLET ORAL
Qty: 20 TABLET | Refills: 0 | Status: ON HOLD | OUTPATIENT
Start: 2023-03-23 | End: 2023-04-01

## 2023-03-23 RX ADMIN — DULOXETINE HYDROCHLORIDE 60 MG: 60 CAPSULE, DELAYED RELEASE ORAL at 08:50

## 2023-03-23 RX ADMIN — INSULIN ASPART 3 UNITS: 100 INJECTION, SOLUTION INTRAVENOUS; SUBCUTANEOUS at 08:56

## 2023-03-23 RX ADMIN — LAMOTRIGINE 100 MG: 100 TABLET ORAL at 21:07

## 2023-03-23 RX ADMIN — LAMOTRIGINE 100 MG: 100 TABLET ORAL at 08:50

## 2023-03-23 RX ADMIN — PREGABALIN 50 MG: 50 CAPSULE ORAL at 21:07

## 2023-03-23 RX ADMIN — ACETAMINOPHEN 975 MG: 325 TABLET, FILM COATED ORAL at 08:49

## 2023-03-23 RX ADMIN — OXYBUTYNIN CHLORIDE 15 MG: 10 TABLET, EXTENDED RELEASE ORAL at 08:49

## 2023-03-23 RX ADMIN — OXYCODONE HYDROCHLORIDE 10 MG: 5 TABLET ORAL at 11:50

## 2023-03-23 RX ADMIN — METHOCARBAMOL 500 MG: 500 TABLET ORAL at 16:09

## 2023-03-23 RX ADMIN — POLYETHYLENE GLYCOL 3350 17 G: 17 POWDER, FOR SOLUTION ORAL at 08:49

## 2023-03-23 RX ADMIN — ACETAMINOPHEN 975 MG: 325 TABLET, FILM COATED ORAL at 16:09

## 2023-03-23 RX ADMIN — OXYCODONE HYDROCHLORIDE 10 MG: 5 TABLET ORAL at 08:49

## 2023-03-23 RX ADMIN — PREGABALIN 50 MG: 50 CAPSULE ORAL at 08:50

## 2023-03-23 RX ADMIN — BUSPIRONE HYDROCHLORIDE 7.5 MG: 5 TABLET ORAL at 08:50

## 2023-03-23 RX ADMIN — PREDNISONE 1 MG: 1 TABLET ORAL at 08:51

## 2023-03-23 RX ADMIN — BUSPIRONE HYDROCHLORIDE 7.5 MG: 5 TABLET ORAL at 16:09

## 2023-03-23 RX ADMIN — BUSPIRONE HYDROCHLORIDE 7.5 MG: 5 TABLET ORAL at 21:07

## 2023-03-23 RX ADMIN — INSULIN ASPART 4 UNITS: 100 INJECTION, SOLUTION INTRAVENOUS; SUBCUTANEOUS at 12:35

## 2023-03-23 RX ADMIN — INSULIN ASPART 6 UNITS: 100 INJECTION, SOLUTION INTRAVENOUS; SUBCUTANEOUS at 16:46

## 2023-03-23 RX ADMIN — HYDROXYZINE HYDROCHLORIDE 10 MG: 10 TABLET ORAL at 05:00

## 2023-03-23 RX ADMIN — DIAZEPAM 2 MG: 2 TABLET ORAL at 16:09

## 2023-03-23 RX ADMIN — SENNOSIDES AND DOCUSATE SODIUM 1 TABLET: 50; 8.6 TABLET ORAL at 08:49

## 2023-03-23 RX ADMIN — PANTOPRAZOLE SODIUM 40 MG: 40 TABLET, DELAYED RELEASE ORAL at 06:52

## 2023-03-23 RX ADMIN — ACETAMINOPHEN 975 MG: 325 TABLET, FILM COATED ORAL at 21:07

## 2023-03-23 RX ADMIN — OXYCODONE HYDROCHLORIDE 10 MG: 5 TABLET ORAL at 19:17

## 2023-03-23 RX ADMIN — PREDNISONE 5 MG: 5 TABLET ORAL at 08:49

## 2023-03-23 RX ADMIN — OXYCODONE HYDROCHLORIDE 10 MG: 5 TABLET ORAL at 14:59

## 2023-03-23 ASSESSMENT — ACTIVITIES OF DAILY LIVING (ADL)
ADLS_ACUITY_SCORE: 38

## 2023-03-23 NOTE — PROGRESS NOTES
Patient vital signs are at baseline: Yes  Patient able to ambulate as they were prior to admission or with assist devices provided by therapies during their stay:  No, not OOB Pt refused  Patient MUST void prior to discharge:  Yes Via Pure Wick  Patient able to tolerate oral intake:  Yes  Pain has adequate pain control using Oral analgesics:  Yes  Does patient have an identified :  Yes  Has goal D/C date and time been discussed with patient:  Yes     Patient is alert and oriented x4, [place], Wheel chair bound per baseline. VSS on RA. Dressing and Ace wrap to left hand clean, dry and intact. Schedule Tylenol, PRN Oxycodone, Robaxin and Atarax given for pain management.

## 2023-03-23 NOTE — PROGRESS NOTES
[facility]  Hospitalist Progress Note        Kalen Soares MD   03/23/2023        Interval History:        - patient reports with continued pain; Evaluated by pain consult team and assisting with postop pain management  - also patient reports inability to move left arm but noted to be moving independently when distracted; likely some anxiety and underlying bipolar contributing to her symptoms  - dispo per Ortho, medically stable; SW following for disposition         Assessment and Plan:        Jumana Yang is a 82 year old female who is extremely hard of hearing (but able to lip read) with PMHx chronic pain, h/o severe spinal stenosis (follows pain clinic), anxiety, depression, bipolar, diabetes mellitus type II, hypertension, COPD, asthma, irritable bowel syndrome who was admitted on 3/21/2023 by Ortho for carpal tunnel release. Hospitalist service was consulted for medical co-management      S/P left open carpal tunnel release (3/21/23):   - Surgery per Dr. Hamilton. MAC with local anesthesia used. EBL 2 ccs.   -- Defer routine post-operative cares, IVF, DVT prophylaxis and pain control to primary service  - Evaluated by pain consult team and assisting with postop pain management  -- Treated with periop Ancef   -- Encourage pulmonary toilet; incentive spirometer at bedside   -- Bowel regimen in place while on narcotics   - therapy rec TCU  - post op Hb 9.3---9.0; monitor hemoglobin    Chronic pain  Severe spinal stenosis had steroid injections in her back, follows up with pain clinic  Arthritis  Neuropathy  - patient's PTA regimen includes Robaxin 500 mg TID PRN , Percocet 1 tablet Q6 hours PRN, prednisone 6 mg daily, Lyrica 50 mg BID  - continue PTA Lyrica,  prednisone 6 mg po every day and prn Robaxin  - plan for narcotic pain regimen for postop pain as noted above     Bipolar 1 disorder  Depression  Anxiety:   - continue PTA Buspar, Valium, Cymbalta, Atarax, Lamictal     Diabetes  "mellitus, non-insulin dependent: a1c 6.9 (3/21/23 )   - Holding PTA Glipizide, Jardiance, and Metformin  - continue sliding scale insulin with hypoglycemia protocol  - given concomitant prednisone use, switched sliding scale insulin to high resistance     Hypertension  Chronic SAMMY:   - holding PTA Dyazide; BP stable; will continue to hold for now and will plan to resume as needed     Dyspepsia  GERD: continue Protonix       Chronic, stable medical conditions  Hard of hearing  COPD  Asthma  Irritable bowel syndrome  Meniere disease  Overactive bladder: continue PTA Ditropan    Diet: Advance Diet as Tolerated: Regular Diet Adult         DVT Prophylaxis: per primary; PCDs    Code status: full code    Disposition: per orthopedics primary;  medically stable; SW following for disposition to TCU    Clinically Significant Risk Factors                        # DMII: A1C = 6.9 % (Ref range: <5.7 %) within past 6 months, PRESENT ON ADMISSION  # Obesity: Estimated body mass index is 31.5 kg/m  as calculated from the following:    Height as of this encounter: 1.651 m (5' 5\").    Weight as of this encounter: 85.9 kg (189 lb 4.8 oz)., PRESENT ON ADMISSION            Page Me (7 am to 6 pm)    Care plan discussed with patient and orthopedics              Physical Exam:      Blood pressure 134/49, pulse 77, temperature 98.5  F (36.9  C), temperature source Oral, resp. rate 16, height 1.651 m (5' 5\"), weight 85.9 kg (189 lb 4.8 oz), SpO2 94 %, not currently breastfeeding.  Vitals:    03/21/23 1024   Weight: 85.9 kg (189 lb 4.8 oz)     Vital Signs with Ranges  Temp:  [98  F (36.7  C)-100.6  F (38.1  C)] 98.5  F (36.9  C)  Pulse:  [51-77] 77  Resp:  [14-16] 16  BP: (113-134)/(49-56) 134/49  SpO2:  [80 %-98 %] 94 %  I/O's Last 24 hours  No intake/output data recorded.    Constitutional: Alert, awake and oriented ; very hard of hearing (able to lip read); tearful       Oral cavity: Moist mucosa   Cardiovascular: Normal s1 s2, regular rate " and rhythm, no murmur   Lungs: B/l clear to auscultation, no wheezes or crepitations   Abdomen: Soft, nt, nd, no guarding, rigidity or rebound; BS +   LE : No edema   Musculoskeletal/Neuro  moving all extremities independently ; left arm in postop bandage /splint ; no focal neurological deficits noted   Psychiatry: anxious                Medications:          acetaminophen  975 mg Oral TID     busPIRone  7.5 mg Oral TID     DULoxetine  60 mg Oral QAM     insulin aspart  1-10 Units Subcutaneous TID AC     insulin aspart  1-7 Units Subcutaneous At Bedtime     lamoTRIgine  100 mg Oral BID     oxybutynin ER  15 mg Oral QAM     pantoprazole  40 mg Oral QAM AC     polyethylene glycol  17 g Oral Daily     predniSONE  1 mg Oral QAM     predniSONE  5 mg Oral QAM     pregabalin  50 mg Oral BID     senna-docusate  1 tablet Oral BID     sodium chloride (PF)  3 mL Intracatheter Q8H     PRN Meds: acetaminophen, sore throat, bisacodyl, glucose **OR** dextrose **OR** glucagon, diazepam, hydrOXYzine, lidocaine 4%, lidocaine (buffered or not buffered), magnesium hydroxide, methocarbamol, naloxone **OR** naloxone **OR** naloxone **OR** naloxone, ondansetron **OR** ondansetron, oxyCODONE **OR** oxyCODONE, prochlorperazine **OR** prochlorperazine, sodium chloride (PF)         Data:      All new lab and imaging data was reviewed.   Recent Labs   Lab Test 03/22/23  0753 07/03/22  2204 06/16/22  2302 05/26/22  2045 02/14/17  1541 09/06/16  0000   WBC  --  16.2* 8.8 9.5   < >  --    HGB 9.3* 10.8* 10.2* 10.3*   < >  --    MCV  --  81 84 81   < >  --    PLT  --  423 326 385   < >  --    INR  --   --  1.02 1.01  --  0.9    < > = values in this interval not displayed.      Recent Labs   Lab Test 03/23/23  0824 03/23/23  0545 03/22/23  2154 03/22/23  0918 03/22/23  0753 03/21/23  1710 03/21/23  1025 07/03/22  2204 06/16/22  2302   0000   NA  --   --   --   --  142  --   --  143 144  --    POTASSIUM  --   --   --   --  3.6  --  3.4 2.9* 3.9  --     CHLORIDE  --   --   --   --  103  --   --  98 103  --    CO2  --   --   --   --  32*  --   --  28 27  --    BUN  --   --   --   --  14.6  --   --  26 20  --    CR  --   --   --   --  0.95  --  0.89 0.95 0.90  --    ANIONGAP  --   --   --   --  7  --   --  17 14  --    MACY  --   --   --   --  9.4  --   --  10.8* 10.0  --    * 179* 249*   < > 164*   < > 102* 128* 135*   < >    < > = values in this interval not displayed.     No lab results found.    Invalid input(s): TROP, TROPONINIES

## 2023-03-23 NOTE — PLAN OF CARE
Goal Outcome Evaluation:    Patient vital signs are at baseline: Yes  Patient able to ambulate as they were prior to admission or with assist devices provided by therapies during their stay:  No,  Reason: wheelchair bound at baseline, not OOB this shift d/t pain  Patient MUST void prior to discharge:  Yes, incontinent of urine, external catheter in place  Patient able to tolerate oral intake:  Yes  Pain has adequate pain control using Oral analgesics:  Yes, pain more manageable at this time. Seen by Pain consult.      Plan to discharge to TCU.

## 2023-03-23 NOTE — PLAN OF CARE
Patient vital signs are at baseline: Yes  Patient able to ambulate as they were prior to admission or with assist devices provided by therapies during their stay:  No,  Reason:  Wheelchair bound at baseline.  Patient MUST void prior to discharge:  Yes via external catheter  Patient able to tolerate oral intake:  Yes  Pain has adequate pain control using Oral analgesics:  Yes oxycodone and tylenol  Does patient have an identified :  Yes  Has goal D/C date and time been discussed with patient:  Yes    TCU pending placement

## 2023-03-23 NOTE — PROGRESS NOTES
Care Management Follow Up    Length of Stay (days): 2    Expected Discharge Date: 03/23/2023     Concerns to be Addressed:       Patient plan of care discussed at interdisciplinary rounds: Yes    Anticipated Discharge Disposition: Transitional Care     Anticipated Discharge Services: Transportation Services  Anticipated Discharge DME: None    Patient/family educated on Medicare website which has current facility and service quality ratings: no  Education Provided on the Discharge Plan:    Patient/Family in Agreement with the Plan: yes    Referrals Placed by CM/SW:    Private pay costs discussed: Not applicable    Additional Information:  Call placed to [facility] and was informed that they do not have a bed and do not anticipate a bed for several days or into next week. Writer called yan Broderick to inquire about other choices. He provided [facility] and [facility] as options. He will also update writer with some additional options later today. Referrals sent via DOD.     1408: [facility] and [facility] both declined as they do not have a bed available. Call to yan Broderick to update and requested additional choices.    1445: Call from son Davie who stated that he spoke to [facility] and was told they would re-evaluate patient as the reason she was declined for not working with therapy. Davie reported that he spoke to patient and she is in agreement to work with all providers so she can discharge to TCU and then return home. He would also like a referral sent [place] [facility]. Call to [facility] and she will be re-evaluated tomorrow. Referral to [facility] sent via DOD.    CARISSA Schuster

## 2023-03-23 NOTE — PROGRESS NOTES
[facility]                                                                                   OUTPATIENT PHYSICAL THERAPY FUNCTIONAL EVALUATION  PLAN OF TREATMENT FOR OUTPATIENT REHABILITATION  (COMPLETE FOR INITIAL CLAIMS ONLY)  Patient's Last Name, First Name, M.I.  YOB: 1940  TreyJumana WINTERS     Provider's Name   [facility]   Medical Record No.  [id number]     Start of Care Date:  03/20/23   Onset Date:  02/08/23   Type:     _X__PT   ____OT  ____SLP Medical Diagnosis:  Falls frequently (R29.6)  - Primary Weakness (R53.1)     PT Diagnosis:  Force production deficit, sensory selection and awaiting deficits Visits from SOC:  1                              __________________________________________________________________________________  Plan of Treatment/Functional Goals:  balance training, gait training, neuromuscular re-education, ROM, strengthening, stretching, transfer training, manual therapy           GOALS  walking  Patient able to walk with least AD x25 feet with min assist or less in order to improve ease of transfers, cardiovascular health, strength, and ability to perform ADLs and IADLs  06/18/23    Balance  Patient will perform at least 20 seconds in all conditions of the mCTSIB.  Patient will be able to perform at least 5 sit to stands with no use of her hands in 30 seconds.  06/18/23    Falls  Patient will verbalize at least 3 fall prevention tactics she can use at home, and demonstrated no falls or near falls x at least 2 weeks.  06/18/23                                                           Therapy Frequency:  1 time/week   Predicted Duration of Therapy Intervention:  90 days    Keiko Zhu, PT                                    I CERTIFY THE NEED FOR THESE SERVICES FURNISHED UNDER        THIS PLAN OF TREATMENT AND WHILE UNDER MY CARE      (Physician co-signature of this document indicates review and certification of the therapy plan).                Certification Date From:  03/20/23   Certification Date To:  06/18/23    Referring Provider:  Gokul Teran MD    Initial Assessment  See Epic Evaluation- Start of Care Date: 03/20/23

## 2023-03-23 NOTE — PROGRESS NOTES
Orthopedic Surgery  Jumana Yang  03/23/2023     Admit Date:  3/21/2023    POD: 2 Days Post-Op   Procedure(s):  LEFT OPEN CARPAL TUNNEL RELEASE. LEFT RING FINGER A1 PULLEY RELEASE    Patient anxiously resting in bed. Patient is hard of hearing, reading lips for communication.    Post operative wrist and hand pain controlled with oral pain medications. Patient admits to new pain in her left and right shoulders.   Tolerating oral intake.    Denies nausea or vomiting.  Denies chest pain or shortness of breath.  No acute events overnight.    Temp:  [98  F (36.7  C)-100.6  F (38.1  C)] 98.5  F (36.9  C)  Pulse:  [51-77] 77  Resp:  [14-16] 16  BP: (113-134)/(49-56) 134/49  SpO2:  [80 %-98 %] 94 %    Alert and oriented.  Respiration is regular and non-labored.   Left soft upper extremity dressing is clean, dry, and intact.   Minimal erythema of the surrounding skin.   Left upper extremity is NVI. Sensation to light touch intact.   Passive left elbow and shoulder range of motion is painless.       Labs:  Recent Labs   Lab Test 03/23/23  0848 03/22/23  0753 07/03/22 2204 06/16/22 2302 05/26/22 2045   WBC  --   --  16.2* 8.8 9.5   HGB 9.0* 9.3* 10.8* 10.2* 10.3*   PLT  --   --  423 326 385     Recent Labs   Lab Test 06/16/22  2302 05/26/22 2045 09/06/16  0000   INR 1.02 1.01 0.9     Recent Labs   Lab Test 07/03/22 2204 03/07/22  1354 07/24/21  1234   CRP 0.6 3.5* 0.3       Assessment/Plan     1. Status post left open carpal tunnel release and left ring finger A1 pulley release:      Nonambulatory, transfer with PT/OT   Advance diabetic diet as tolerated.    Continue current pain regimen with oral Oxycodone every 3 hours as needed. Avoid use of IV Dilaudid.    Dressings: Keep intact for 5 days after surgery. Once dressing is removed, place a band-aid over the incisions.    Follow-up: 2 weeks post-op for suture removal with Diamond Fuentes, PA-C.     2. Disposition   Cleared for discharge.    Anticipate d/c to TCU when a bed  becomes available.     Diamond Fuentes PA-C  [facility]

## 2023-03-23 NOTE — PROGRESS NOTES
"   03/20/23 1535   Quick Adds   Quick Adds Certification  (cert required:  MEDICARE / MEDICARE)   Type of Visit Initial OP PT Evaluation       Present Yes   Language Other  (Touch type to text rather than ASL)   General Information   Start of Care Date 03/20/23   Referring Physician Gokul Teran MD   Orders Evaluate and Treat as Indicated   Additional Orders lymph edema   Order Date 02/08/23   Medical Diagnosis Falls frequently (R29.6)  - Primary Weakness (R53.1)   Onset of illness/injury or Date of Surgery 02/08/23   Precautions/Limitations fall precautions   Surgical/Medical history reviewed Yes   Pertinent history of current problem (include personal factors and/or comorbidities that impact the POC) Pt is an 81 y/o woman with an extensive PMHX including Meniere's disease, Bilateral back pain, Mixed hyperlipidemia, Type 2 diabetes mellitus with diabetic nephropathy, lymphedema (B) LE s, Acute renal failure, pneumonia and sepsis.  She presents to therapy today due to increased falls and weakness.  She wants to know \"how bad she is.\" She states she has been using the WC as her mode of walking approximately 1 year.   fell in the beginning of September in the apartment. She has fallen other times as well.  reports she is having carpal tunnel surgery release tomorrow and her plan is to go to TCU after surgery.   Pertinent Visual History  no change   Prior level of function comment Has used manual WC 1 approximately 1 year.   Current Community Support Personal care attendant;Family/friend caregiver   Patient role/Employment history Disabled   Living environment Apartment/condo   Home/Community Accessibility Comments elevator   Current Assistive Devices Manual Wheel Chair   ADL Devices Shower/Tub Grab Bar;Shower/Tub Chair;Raised Toilet Seat  with Arms;Wall Grab Bar   Patient/Family Goals Statement pt wants to work on walking.   Fall Risk Screen   Fall screen completed by PT   Have you " "fallen 2 or more times in the past year? Yes   Have you fallen and had an injury in the past year? Yes   Is patient a fall risk? Yes   Abuse Screen (yes response referral indicated)   Feels Unsafe at Home or Work/School no   Feels Threatened by Someone no   Does Anyone Try to Keep You From Having Contact with Others or Doing Things Outside Your Home? no   Physical Signs of Abuse Present no   Pain   Patient currently in pain Yes   Pain location entire back pain up to the neck is much less. Pain in legs and buttocks is more constant. R leg is better than the L   Pain description Heaviness;Ache;Sharp;Dull   Pain comments \" back feels twisted\"   Cognitive Status Examination   Orientation orientation to person, place and time   Level of Consciousness alert   Follows Commands and Answers Questions 75% of the time;100% of the time   Cognitive Comment will continue to assess   Observation   Observation very fearful axious.   Integumentary   Integumentary Comments edema wraps in place on legs   Posture   Posture Comments seated in WC, forward head rounded shoulders   Range of Motion (ROM)   ROM Comment not fully assessed today,  will continue to assess. LImited shoulder and LE ROM   Strength   Strength Comments significant impairment. pt reports unable to rise from sitting without use of hands.  Unable to maintain standing without UE support.   Bed Mobility   Bed Mobility Comments will test next session   Transfer Skills   Transfer Comments Reports she uses heavy use of grab bars to transfer from bed to chair, chair to toilet, chair to tub get   Gait   Gait Comments Not assessed today.  Patient reports that she can take steps with 2 wheeled walker.  Currently only uses gait for short transfers   Balance   Balance Comments Known Ménière's disease, uses steroids chronically.   Balance Special Tests   Balance Special Tests Sit to stand reps;Modified CTSIB Conditions   Balance Special Tests Modified CTSIB Conditions   Modified " CTSIB Comments unable to stand with out UE support today- fearful?   Balance Special Tests Sit to Stand Reps in 30 Seconds   Reps in 30 seconds 0   Height 18   Comments very fearful. is able to stand with SBA if allowed to use th earms of her chair.   Sensory Examination   Sensory Perception Comments Leg wraps in place, not assessed today.   Planned Therapy Interventions   Planned Therapy Interventions balance training;gait training;neuromuscular re-education;ROM;strengthening;stretching;transfer training;manual therapy   Clinical Impression   Criteria for Skilled Therapeutic Interventions Met yes, treatment indicated   PT Diagnosis Force production deficit, sensory selection and awaiting deficits   Influenced by the following impairments Back pain, weakness, prolonged illness, vestibular loss   Functional limitations due to impairments Weakness and high risk for falls with functional mobility for ADLs and IADLs   Clinical Presentation Evolving/Changing   Clinical Presentation Rationale Complicated medical history, multisystem involvement, clinical judgment   Clinical Decision Making (Complexity) Moderate complexity   Therapy Frequency 1 time/week   Predicted Duration of Therapy Intervention (days/wks) 90 days   Risk & Benefits of therapy have been explained Yes   Patient, Family & other staff in agreement with plan of care Yes   Clinical Impression Comments PPW Back pain, weakness, prolonged illness, vestibular loss leading to Force production deficit, sensory selection and awaiting deficits.  Patient will benefit from skilled physical therapy for instruction in HEP for strengthening and neuromuscular reeducation.  Patient will also benefit from skilled use of modalities and manual techniques as indicated to decrease symptoms and improve function.   Education Assessment   Preferred Learning Style Reading;Demonstration   Barriers to Learning Cognitive;Hearing   GOALS   PT Eval Goals 1;2;3   Goal 1   Goal Identifier  walking   Goal Description Patient able to walk with least AD x25 feet with min assist or less in order to improve ease of transfers, cardiovascular health, strength, and ability to perform ADLs and IADLs   Target Date 06/18/23   Goal 2   Goal Identifier Balance   Goal Description Patient will perform at least 20 seconds in all conditions of the mCTSIB.  Patient will be able to perform at least 5 sit to stands with no use of her hands in 30 seconds.   Target Date 06/18/23   Goal 3   Goal Identifier Falls   Goal Description Patient will verbalize at least 3 fall prevention tactics she can use at home, and demonstrated no falls or near falls x at least 2 weeks.   Target Date 06/18/23   Total Evaluation Time   PT Eval, Moderate Complexity Minutes (21683) 30   Therapy Certification   Certification date from 03/20/23   Certification date to 06/18/23   Medical Diagnosis Falls frequently (R29.6)  - Primary Weakness (R53.1)

## 2023-03-24 LAB
GLUCOSE BLDC GLUCOMTR-MCNC: 122 MG/DL (ref 70–99)
GLUCOSE BLDC GLUCOMTR-MCNC: 131 MG/DL (ref 70–99)
GLUCOSE BLDC GLUCOMTR-MCNC: 135 MG/DL (ref 70–99)
GLUCOSE BLDC GLUCOMTR-MCNC: 192 MG/DL (ref 70–99)
GLUCOSE BLDC GLUCOMTR-MCNC: 221 MG/DL (ref 70–99)
GLUCOSE BLDC GLUCOMTR-MCNC: 326 MG/DL (ref 70–99)
GLUCOSE SERPL-MCNC: 151 MG/DL (ref 70–99)

## 2023-03-24 PROCEDURE — 82947 ASSAY GLUCOSE BLOOD QUANT: CPT | Performed by: HOSPITALIST

## 2023-03-24 PROCEDURE — 250N000013 HC RX MED GY IP 250 OP 250 PS 637: Performed by: PHYSICIAN ASSISTANT

## 2023-03-24 PROCEDURE — 250N000013 HC RX MED GY IP 250 OP 250 PS 637: Performed by: CLINICAL NURSE SPECIALIST

## 2023-03-24 PROCEDURE — 99231 SBSQ HOSP IP/OBS SF/LOW 25: CPT | Performed by: HOSPITALIST

## 2023-03-24 PROCEDURE — 120N000001 HC R&B MED SURG/OB

## 2023-03-24 PROCEDURE — 36415 COLL VENOUS BLD VENIPUNCTURE: CPT | Performed by: HOSPITALIST

## 2023-03-24 PROCEDURE — 250N000013 HC RX MED GY IP 250 OP 250 PS 637: Performed by: HOSPITALIST

## 2023-03-24 PROCEDURE — 250N000013 HC RX MED GY IP 250 OP 250 PS 637: Performed by: ORTHOPAEDIC SURGERY

## 2023-03-24 PROCEDURE — 250N000012 HC RX MED GY IP 250 OP 636 PS 637: Performed by: PHYSICIAN ASSISTANT

## 2023-03-24 RX ORDER — GLIPIZIDE 10 MG/1
10 TABLET ORAL
Status: DISCONTINUED | OUTPATIENT
Start: 2023-03-24 | End: 2023-03-27 | Stop reason: HOSPADM

## 2023-03-24 RX ADMIN — OXYCODONE HYDROCHLORIDE 10 MG: 5 TABLET ORAL at 11:34

## 2023-03-24 RX ADMIN — PREDNISONE 1 MG: 1 TABLET ORAL at 08:27

## 2023-03-24 RX ADMIN — ACETAMINOPHEN 975 MG: 325 TABLET, FILM COATED ORAL at 16:03

## 2023-03-24 RX ADMIN — OXYBUTYNIN CHLORIDE 15 MG: 10 TABLET, EXTENDED RELEASE ORAL at 08:25

## 2023-03-24 RX ADMIN — OXYCODONE HYDROCHLORIDE 10 MG: 5 TABLET ORAL at 21:47

## 2023-03-24 RX ADMIN — DULOXETINE HYDROCHLORIDE 60 MG: 60 CAPSULE, DELAYED RELEASE ORAL at 08:25

## 2023-03-24 RX ADMIN — BUSPIRONE HYDROCHLORIDE 7.5 MG: 5 TABLET ORAL at 08:25

## 2023-03-24 RX ADMIN — ACETAMINOPHEN 975 MG: 325 TABLET, FILM COATED ORAL at 08:24

## 2023-03-24 RX ADMIN — PANTOPRAZOLE SODIUM 40 MG: 40 TABLET, DELAYED RELEASE ORAL at 07:39

## 2023-03-24 RX ADMIN — SENNOSIDES AND DOCUSATE SODIUM 1 TABLET: 50; 8.6 TABLET ORAL at 21:40

## 2023-03-24 RX ADMIN — BUSPIRONE HYDROCHLORIDE 7.5 MG: 5 TABLET ORAL at 21:40

## 2023-03-24 RX ADMIN — BUSPIRONE HYDROCHLORIDE 7.5 MG: 5 TABLET ORAL at 16:03

## 2023-03-24 RX ADMIN — HYDROXYZINE HYDROCHLORIDE 10 MG: 10 TABLET ORAL at 01:00

## 2023-03-24 RX ADMIN — ACETAMINOPHEN 975 MG: 325 TABLET, FILM COATED ORAL at 21:40

## 2023-03-24 RX ADMIN — LAMOTRIGINE 100 MG: 100 TABLET ORAL at 08:25

## 2023-03-24 RX ADMIN — OXYCODONE HYDROCHLORIDE 10 MG: 5 TABLET ORAL at 01:00

## 2023-03-24 RX ADMIN — PREGABALIN 50 MG: 50 CAPSULE ORAL at 21:40

## 2023-03-24 RX ADMIN — OXYCODONE HYDROCHLORIDE 10 MG: 5 TABLET ORAL at 18:44

## 2023-03-24 RX ADMIN — HYDROXYZINE HYDROCHLORIDE 10 MG: 10 TABLET ORAL at 11:34

## 2023-03-24 RX ADMIN — LAMOTRIGINE 100 MG: 100 TABLET ORAL at 21:40

## 2023-03-24 RX ADMIN — PREGABALIN 50 MG: 50 CAPSULE ORAL at 08:25

## 2023-03-24 RX ADMIN — INSULIN ASPART 8 UNITS: 100 INJECTION, SOLUTION INTRAVENOUS; SUBCUTANEOUS at 12:35

## 2023-03-24 RX ADMIN — OXYCODONE HYDROCHLORIDE 10 MG: 5 TABLET ORAL at 07:39

## 2023-03-24 RX ADMIN — GLIPIZIDE 10 MG: 10 TABLET ORAL at 16:03

## 2023-03-24 RX ADMIN — POLYETHYLENE GLYCOL 3350 17 G: 17 POWDER, FOR SOLUTION ORAL at 08:24

## 2023-03-24 RX ADMIN — PREDNISONE 5 MG: 5 TABLET ORAL at 08:25

## 2023-03-24 ASSESSMENT — ACTIVITIES OF DAILY LIVING (ADL)
ADLS_ACUITY_SCORE: 38
ADLS_ACUITY_SCORE: 34
ADLS_ACUITY_SCORE: 38
ADLS_ACUITY_SCORE: 34
ADLS_ACUITY_SCORE: 38
ADLS_ACUITY_SCORE: 34
ADLS_ACUITY_SCORE: 34
ADLS_ACUITY_SCORE: 38

## 2023-03-24 NOTE — PLAN OF CARE
Summary: L open carpal tunnel release   Date & Time: 3939-4938 3/23/23  Orientation: A&Ox4, anxious/frustrated about communication, extremely hard of hearing, can do some lip reading but writing it down works better  POD#2  Activity Level: Assist of 2 lift  Fall Risk: Yes  Behavior & Aggression: Green  ABNL VS/O2: VSS on RA   Diet: Regular  Drains/Devices: No IV  Skin: Splint and Ace wrap on L hand CDI  Other: Gave Valium for anxiety     Patient vital signs are at baseline: Yes  Patient able to ambulate as they were prior to admission or with assist devices provided by therapies during their stay: Yes, Wheelchair bound at baseline.  Patient MUST void prior to discharge:  Yes via external chronic catheter  Patient able to tolerate oral intake:  Yes  Pain has adequate pain control using Oral analgesics:  schedule Tylenol, PRN Oxycodone, and Robaxin   Does patient have an identified :  Yes  Has goal D/C date and time been discussed with patient:  Yes    Discharge: Pending placement

## 2023-03-24 NOTE — PROGRESS NOTES
[facility]  Hospitalist Progress Note        Kalen Soares MD   03/24/2023        Interval History:        - No acute issues overnight; appears more comfortable today  - blood glucose elevated likely secondary to concomitant prednisone; will resume PTA glipizide         Assessment and Plan:        Jumana Yang is a 82 year old female who is extremely hard of hearing (but able to lip read) with PMHx chronic pain, h/o severe spinal stenosis (follows pain clinic), anxiety, depression, bipolar, diabetes mellitus type II, hypertension, COPD, asthma, irritable bowel syndrome who was admitted on 3/21/2023 by Ortho for carpal tunnel release. Hospitalist service was consulted for medical co-management      S/P left open carpal tunnel release (3/21/23):   - Surgery per Dr. Hamilton. MAC with local anesthesia used. EBL 2 ccs.   -- Defer routine post-operative cares, IVF, DVT prophylaxis and pain control to primary service  - Evaluated by pain consult team and assisting with postop pain management  -- Treated with periop Ancef   -- Encourage pulmonary toilet; incentive spirometer at bedside   -- Bowel regimen in place while on narcotics   - therapy rec TCU  - post op Hb 9.3---9.0    Chronic pain  Severe spinal stenosis had steroid injections in her back, follows up with pain clinic  Arthritis  Neuropathy  - patient's PTA regimen includes Robaxin 500 mg TID PRN , Percocet 1 tablet Q6 hours PRN, prednisone 6 mg daily, Lyrica 50 mg BID  - continue PTA Lyrica,  prednisone 6 mg po every day and prn Robaxin  - plan for narcotic pain regimen for postop pain as noted above     Bipolar 1 disorder  Depression  Anxiety:   - continue PTA Buspar, Valium, Cymbalta, Atarax, Lamictal     Diabetes mellitus, non-insulin dependent: a1c 6.9 (3/21/23 )   - Holding PTA Jardiance, and Metformin  - continue sliding scale insulin with hypoglycemia protocol  - blood glucose elevated likely secondary to concomitant prednisone; will  "resume PTA glipizide (3/24)  - continue sliding scale insulin high resistance    Hypertension  Chronic SAMMY:   - holding PTA Dyazide; BP stable; will continue to hold for now and will plan to resume as needed     Dyspepsia  GERD: continue Protonix       Chronic, stable medical conditions  Hard of hearing  COPD  Asthma  Irritable bowel syndrome  Meniere disease  Overactive bladder: continue PTA Ditropan    Diet: Advance Diet as Tolerated: Regular Diet Adult  Diet         DVT Prophylaxis: per primary; PCDs    Code status: full code    Disposition: per orthopedics primary;  medically stable; SW following for disposition to TCU    Clinically Significant Risk Factors                        # DMII: A1C = 6.9 % (Ref range: <5.7 %) within past 6 months, PRESENT ON ADMISSION  # Obesity: Estimated body mass index is 31.5 kg/m  as calculated from the following:    Height as of this encounter: 1.651 m (5' 5\").    Weight as of this encounter: 85.9 kg (189 lb 4.8 oz)., PRESENT ON ADMISSION            Page Me (7 am to 6 pm)    Care plan discussed with patient              Physical Exam:      Blood pressure 135/51, pulse 75, temperature 98.2  F (36.8  C), temperature source Axillary, resp. rate 16, height 1.651 m (5' 5\"), weight 85.9 kg (189 lb 4.8 oz), SpO2 97 %, not currently breastfeeding.  Vitals:    03/21/23 1024   Weight: 85.9 kg (189 lb 4.8 oz)     Vital Signs with Ranges  Temp:  [98.2  F (36.8  C)-99.4  F (37.4  C)] 98.2  F (36.8  C)  Pulse:  [75-79] 75  Resp:  [14-18] 16  BP: (120-139)/(49-62) 135/51  SpO2:  [91 %-97 %] 97 %  I/O's Last 24 hours  I/O last 3 completed shifts:  In: -   Out: 1300 [Urine:1300]    Constitutional: Alert, awake and oriented ; very hard of hearing (able to lip read)       Oral cavity: Moist mucosa   Cardiovascular: Normal s1 s2, regular rate and rhythm, no murmur   Lungs: B/l clear to auscultation, no wheezes or crepitations   Abdomen: Soft, nt, nd, no guarding, rigidity or rebound; BS +   LE : No " edema   Musculoskeletal/Neuro  moving all extremities independently ; left arm in postop bandage /splint ; no focal neurological deficits noted   Psychiatry: anxious                Medications:          acetaminophen  975 mg Oral TID     busPIRone  7.5 mg Oral TID     DULoxetine  60 mg Oral QAM     insulin aspart  1-10 Units Subcutaneous TID AC     insulin aspart  1-7 Units Subcutaneous At Bedtime     lamoTRIgine  100 mg Oral BID     oxybutynin ER  15 mg Oral QAM     pantoprazole  40 mg Oral QAM AC     polyethylene glycol  17 g Oral Daily     predniSONE  1 mg Oral QAM     predniSONE  5 mg Oral QAM     pregabalin  50 mg Oral BID     senna-docusate  1 tablet Oral BID     sodium chloride (PF)  3 mL Intracatheter Q8H     PRN Meds: acetaminophen, sore throat, bisacodyl, glucose **OR** dextrose **OR** glucagon, diazepam, hydrOXYzine, lidocaine 4%, lidocaine (buffered or not buffered), magnesium hydroxide, methocarbamol, naloxone **OR** naloxone **OR** naloxone **OR** naloxone, ondansetron **OR** ondansetron, oxyCODONE **OR** oxyCODONE, prochlorperazine **OR** prochlorperazine, sodium chloride (PF)         Data:      All new lab and imaging data was reviewed.   Recent Labs   Lab Test 03/23/23  0848 03/22/23  0753 07/03/22 2204 06/16/22 2302 05/26/22  2045 02/14/17  1541 09/06/16  0000   WBC  --   --  16.2* 8.8 9.5   < >  --    HGB 9.0* 9.3* 10.8* 10.2* 10.3*   < >  --    MCV  --   --  81 84 81   < >  --    PLT  --   --  423 326 385   < >  --    INR  --   --   --  1.02 1.01  --  0.9    < > = values in this interval not displayed.      Recent Labs   Lab Test 03/24/23  0734 03/24/23  0234 03/23/23  2233 03/22/23  0918 03/22/23  0753 03/21/23  1710 03/21/23  1025 07/03/22  2204 06/16/22  2302   0000   NA  --   --   --   --  142  --   --  143 144  --    POTASSIUM  --   --   --   --  3.6  --  3.4 2.9* 3.9  --    CHLORIDE  --   --   --   --  103  --   --  98 103  --    CO2  --   --   --   --  32*  --   --  28 27  --    BUN  --    --   --   --  14.6  --   --  26 20  --    CR  --   --   --   --  0.95  --  0.89 0.95 0.90  --    ANIONGAP  --   --   --   --  7  --   --  17 14  --    MACY  --   --   --   --  9.4  --   --  10.8* 10.0  --    * 192* 171*   < > 164*   < > 102* 128* 135*   < >    < > = values in this interval not displayed.     No lab results found.    Invalid input(s): TROP, TROPONINIES

## 2023-03-24 NOTE — PLAN OF CARE
Goal Outcome Evaluation:    A/O  x4. VSS on RA.[place] with HA.PIV sl. CMS intact. Dressing CDI. Voiding with purewck.Up with lift. Wheel chair bound.  BG 0200 192 mg/dl. Pain managed with Oxycodone.PIV sl. Discharge pending TCU placement.

## 2023-03-24 NOTE — PROGRESS NOTES
Orthopedic Surgery  Jumana Yang  03/24/2023     Admit Date:  3/21/2023    POD: 3 Days Post-Op   Procedure(s):  LEFT OPEN CARPAL TUNNEL RELEASE. LEFT RING FINGER A1 PULLEY RELEASE    Patient anxiously resting in bed. Patient is hard of hearing, reading lips for communication.    Post operative wrist and hand pain controlled with oral pain medications. Patient admits to continued pain in her left and right shoulders.   Tolerating oral intake.    Denies nausea or vomiting.  Denies chest pain or shortness of breath.  No acute events overnight.    Temp:  [98.2  F (36.8  C)-99.4  F (37.4  C)] 98.2  F (36.8  C)  Pulse:  [75-79] 75  Resp:  [14-16] 16  BP: (120-139)/(51-62) 135/51  SpO2:  [91 %-97 %] 97 %    Alert and oriented. Sitting up in bed  Respiration is regular and non-labored.   Left soft upper extremity dressing is clean, dry, and intact.   Minimal erythema of the surrounding skin.   Left upper extremity is NVI. Sensation to light touch intact.   Passive left elbow and shoulder range of motion is painless.       Labs:  Recent Labs   Lab Test 03/23/23  0848 03/22/23  0753 07/03/22 2204 06/16/22 2302 05/26/22 2045   WBC  --   --  16.2* 8.8 9.5   HGB 9.0* 9.3* 10.8* 10.2* 10.3*   PLT  --   --  423 326 385     Recent Labs   Lab Test 06/16/22 2302 05/26/22 2045 09/06/16  0000   INR 1.02 1.01 0.9     Recent Labs   Lab Test 07/03/22 2204 03/07/22  1354 07/24/21  1234   CRP 0.6 3.5* 0.3       Assessment/Plan     1. Status post left open carpal tunnel release and left ring finger A1 pulley release:      Nonambulatory, transfer with PT/OT   Advance diabetic diet as tolerated.    Continue current pain regimen with oral Oxycodone every 3 hours as needed. Avoid use of IV Dilaudid.    Dressings: Keep intact for 5 days after surgery. Once dressing is removed, place a band-aid over the incisions.    Follow-up: 2 weeks post-op for suture removal with Diamond Fuentes PA-C.     2. Disposition   Cleared for discharge. Needs to  show willingness to work with therapies for rehab.    Anticipate d/c to TCU when a bed becomes available.     Kjerstin Foss PA-C  [facility]

## 2023-03-24 NOTE — PROGRESS NOTES
Care Management Follow Up    Length of Stay (days): 3    Expected Discharge Date: 03/24/2023     Concerns to be Addressed:       Patient plan of care discussed at interdisciplinary rounds: Yes    Anticipated Discharge Disposition: Transitional Care     Anticipated Discharge Services: Transportation Services  Anticipated Discharge DME: None    Patient/family educated on Medicare website which has current facility and service quality ratings: no  Education Provided on the Discharge Plan:    Patient/Family in Agreement with the Plan: yes    Referrals Placed by CM/SW:    Private pay costs discussed: Not applicable    Additional Information:  Call received from patient's son regarding placement. Writer reached out to [facility] regarding being told that they would follow patient for reconsideration when she's working with therapy. Writer will update patient's son when status is known.    Call placed to son Davie to update that patient is being re-evaluated at this time. It is unknown whether she will be accepted at [facility] so in the meantime it would be good to get more options for TCU. Writer asked for Davie to return call and to leave this information on voicemail if SW can't answer.     1644: Writer spoke to patient's son Davie who would like SW to call [facility] and see if anything has changed. He also asked for a referral to be sent to [facility] and will review list and call in AM to provide multiple other choices. Writer confirmed that phone is private and he can call and leave choices on the mobile phone voicemail if SW does not answer. Writer called [facility] and spoke to admissions and was told that they could not meet patient's needs and would not be able to offer a bed.    CARISSA Schuster

## 2023-03-24 NOTE — PROGRESS NOTES
Notified provider about indwelling nolasco catheter discussed removal or continued need.    Did provider choose to remove indwelling nolasco catheter? No    Provider's nolasco indication for keeping indwelling nolasco catheter: Retention.    Is there an order for indwelling nolasco catheter? Yes    *If there is a plan to keep nolasco catheter in place at discharge daily notification with provider is not necessary, but please add a notation in the treatment team sticky note that the patient will be discharging with the catheter.

## 2023-03-24 NOTE — PLAN OF CARE
Goal Outcome Evaluation:    Patient vital signs are at baseline: Yes  Patient able to ambulate as they were prior to admission or with assist devices provided by therapies during their stay:  No,  Reason:  Wheelchair bound at baseline  Patient MUST void prior to discharge:  Yes via external catheter  Patient able to tolerate oral intake:  Yes  Pain has adequate pain control using Oral analgesics:  Yes oxycodone and Atarax  Does patient have an identified :  Yes  Has goal D/C date and time been discussed with patient:  Yes   Dressing CDI. Mepilex on coccyx CDI. BG check with insulin coverage.

## 2023-03-25 ENCOUNTER — APPOINTMENT (OUTPATIENT)
Dept: OCCUPATIONAL THERAPY | Facility: CLINIC | Age: 83
DRG: 042 | End: 2023-03-25
Attending: ORTHOPAEDIC SURGERY
Payer: MEDICARE

## 2023-03-25 LAB
GLUCOSE BLDC GLUCOMTR-MCNC: 133 MG/DL (ref 70–99)
GLUCOSE BLDC GLUCOMTR-MCNC: 146 MG/DL (ref 70–99)
GLUCOSE BLDC GLUCOMTR-MCNC: 152 MG/DL (ref 70–99)
GLUCOSE BLDC GLUCOMTR-MCNC: 157 MG/DL (ref 70–99)
GLUCOSE BLDC GLUCOMTR-MCNC: 371 MG/DL (ref 70–99)

## 2023-03-25 PROCEDURE — 99232 SBSQ HOSP IP/OBS MODERATE 35: CPT | Performed by: HOSPITALIST

## 2023-03-25 PROCEDURE — 97535 SELF CARE MNGMENT TRAINING: CPT | Mod: GO | Performed by: OCCUPATIONAL THERAPIST

## 2023-03-25 PROCEDURE — 250N000013 HC RX MED GY IP 250 OP 250 PS 637: Performed by: HOSPITALIST

## 2023-03-25 PROCEDURE — 250N000013 HC RX MED GY IP 250 OP 250 PS 637: Performed by: PHYSICIAN ASSISTANT

## 2023-03-25 PROCEDURE — 250N000013 HC RX MED GY IP 250 OP 250 PS 637: Performed by: CLINICAL NURSE SPECIALIST

## 2023-03-25 PROCEDURE — 250N000012 HC RX MED GY IP 250 OP 636 PS 637: Performed by: HOSPITALIST

## 2023-03-25 PROCEDURE — 250N000012 HC RX MED GY IP 250 OP 636 PS 637: Performed by: PHYSICIAN ASSISTANT

## 2023-03-25 PROCEDURE — 250N000013 HC RX MED GY IP 250 OP 250 PS 637: Performed by: ORTHOPAEDIC SURGERY

## 2023-03-25 PROCEDURE — 120N000001 HC R&B MED SURG/OB

## 2023-03-25 PROCEDURE — 97110 THERAPEUTIC EXERCISES: CPT | Mod: GO | Performed by: OCCUPATIONAL THERAPIST

## 2023-03-25 RX ORDER — DIPHENHYDRAMINE HCL 12.5MG/5ML
12.5 LIQUID (ML) ORAL DAILY PRN
Status: DISCONTINUED | OUTPATIENT
Start: 2023-03-25 | End: 2023-03-26

## 2023-03-25 RX ORDER — HYDROXYZINE HYDROCHLORIDE 10 MG/1
10-20 TABLET, FILM COATED ORAL 3 TIMES DAILY PRN
Status: DISCONTINUED | OUTPATIENT
Start: 2023-03-25 | End: 2023-03-27 | Stop reason: HOSPADM

## 2023-03-25 RX ORDER — IODINE/SODIUM IODIDE 2 %
TINCTURE TOPICAL
Status: DISCONTINUED | OUTPATIENT
Start: 2023-03-25 | End: 2023-03-27 | Stop reason: HOSPADM

## 2023-03-25 RX ADMIN — PREGABALIN 50 MG: 50 CAPSULE ORAL at 20:24

## 2023-03-25 RX ADMIN — ACETAMINOPHEN 975 MG: 325 TABLET, FILM COATED ORAL at 08:18

## 2023-03-25 RX ADMIN — ACETAMINOPHEN 975 MG: 325 TABLET, FILM COATED ORAL at 22:10

## 2023-03-25 RX ADMIN — POLYETHYLENE GLYCOL 3350 17 G: 17 POWDER, FOR SOLUTION ORAL at 08:17

## 2023-03-25 RX ADMIN — BUSPIRONE HYDROCHLORIDE 7.5 MG: 5 TABLET ORAL at 22:10

## 2023-03-25 RX ADMIN — OXYCODONE HYDROCHLORIDE 10 MG: 5 TABLET ORAL at 20:24

## 2023-03-25 RX ADMIN — OXYCODONE HYDROCHLORIDE 10 MG: 5 TABLET ORAL at 17:20

## 2023-03-25 RX ADMIN — PREDNISONE 1 MG: 1 TABLET ORAL at 08:20

## 2023-03-25 RX ADMIN — SENNOSIDES AND DOCUSATE SODIUM 1 TABLET: 50; 8.6 TABLET ORAL at 08:19

## 2023-03-25 RX ADMIN — SENNOSIDES AND DOCUSATE SODIUM 1 TABLET: 50; 8.6 TABLET ORAL at 20:24

## 2023-03-25 RX ADMIN — OXYCODONE HYDROCHLORIDE 10 MG: 5 TABLET ORAL at 11:17

## 2023-03-25 RX ADMIN — EMPAGLIFLOZIN 25 MG: 25 TABLET, FILM COATED ORAL at 15:18

## 2023-03-25 RX ADMIN — INSULIN ASPART 1 UNITS: 100 INJECTION, SOLUTION INTRAVENOUS; SUBCUTANEOUS at 17:19

## 2023-03-25 RX ADMIN — PREDNISONE 5 MG: 5 TABLET ORAL at 08:20

## 2023-03-25 RX ADMIN — PANTOPRAZOLE SODIUM 40 MG: 40 TABLET, DELAYED RELEASE ORAL at 06:02

## 2023-03-25 RX ADMIN — LAMOTRIGINE 100 MG: 100 TABLET ORAL at 20:24

## 2023-03-25 RX ADMIN — BUSPIRONE HYDROCHLORIDE 7.5 MG: 5 TABLET ORAL at 08:19

## 2023-03-25 RX ADMIN — INSULIN ASPART 1 UNITS: 100 INJECTION, SOLUTION INTRAVENOUS; SUBCUTANEOUS at 08:17

## 2023-03-25 RX ADMIN — ACETAMINOPHEN 975 MG: 325 TABLET, FILM COATED ORAL at 17:19

## 2023-03-25 RX ADMIN — BUSPIRONE HYDROCHLORIDE 7.5 MG: 5 TABLET ORAL at 17:20

## 2023-03-25 RX ADMIN — OXYBUTYNIN CHLORIDE 15 MG: 10 TABLET, EXTENDED RELEASE ORAL at 08:20

## 2023-03-25 RX ADMIN — GLIPIZIDE 10 MG: 10 TABLET ORAL at 08:19

## 2023-03-25 RX ADMIN — OXYCODONE HYDROCHLORIDE 10 MG: 5 TABLET ORAL at 02:25

## 2023-03-25 RX ADMIN — PREGABALIN 50 MG: 50 CAPSULE ORAL at 08:20

## 2023-03-25 RX ADMIN — DIPHENHYDRAMINE HYDROCHLORIDE 12.5 MG: 25 SOLUTION ORAL at 11:17

## 2023-03-25 RX ADMIN — INSULIN ASPART 10 UNITS: 100 INJECTION, SOLUTION INTRAVENOUS; SUBCUTANEOUS at 12:38

## 2023-03-25 RX ADMIN — GLIPIZIDE 10 MG: 10 TABLET ORAL at 17:20

## 2023-03-25 RX ADMIN — OXYCODONE HYDROCHLORIDE 10 MG: 5 TABLET ORAL at 06:02

## 2023-03-25 RX ADMIN — LAMOTRIGINE 100 MG: 100 TABLET ORAL at 08:19

## 2023-03-25 RX ADMIN — DULOXETINE HYDROCHLORIDE 60 MG: 60 CAPSULE, DELAYED RELEASE ORAL at 08:19

## 2023-03-25 ASSESSMENT — ACTIVITIES OF DAILY LIVING (ADL)
ADLS_ACUITY_SCORE: 34
ADLS_ACUITY_SCORE: 44
ADLS_ACUITY_SCORE: 34
ADLS_ACUITY_SCORE: 44
ADLS_ACUITY_SCORE: 34
ADLS_ACUITY_SCORE: 44

## 2023-03-25 NOTE — PROGRESS NOTES
[facility]  Hospitalist Progress Note        Kalen Soares MD   03/25/2023        Interval History:        - Reports itching at several places mostly in the back and at the surgery site-- suggested to use available PRN Hydroxyzine  - will also resume her PTA Calamine lotion  - still with some pain but seems reasonably controlled; awaiting placement  - BG in 300s; glipizide resumed 3/24, will also resume PTA Jardiance; continue with sliding scale insulin; continue to hold PTA metformin  - will switch diet to moderate consistent diet  - awaiting TCU placement         Assessment and Plan:        Jumana Yang is a 82 year old female who is extremely hard of hearing (but able to lip read) with PMHx chronic pain, h/o severe spinal stenosis (follows pain clinic), anxiety, depression, bipolar, diabetes mellitus type II, hypertension, COPD, asthma, irritable bowel syndrome who was admitted on 3/21/2023 by Ortho for carpal tunnel release. Hospitalist service was consulted for medical co-management      S/P left open carpal tunnel release (3/21/23):   - Surgery per Dr. Hamilton. MAC with local anesthesia used. EBL 2 ccs.   -- Defer routine post-operative cares, IVF, DVT prophylaxis and pain control to primary service  - Evaluated by pain consult team and assisting with postop pain management  -- Treated with periop Ancef   -- Encourage pulmonary toilet; incentive spirometer at bedside   -- Bowel regimen in place while on narcotics   - therapy rec TCU  - post op Hb 9.3---9.0    Chronic pain  Severe spinal stenosis had steroid injections in her back, follows up with pain clinic  Arthritis  Neuropathy  - patient's PTA regimen includes Robaxin 500 mg TID PRN , Percocet 1 tablet Q6 hours PRN, prednisone 6 mg daily, Lyrica 50 mg BID  - continue PTA Lyrica,  prednisone 6 mg po every day and prn Robaxin  - plan for narcotic pain regimen for postop pain as noted above, per primary  - resume PTA Calamine lotion  "for itching; also has prn atarax available     Bipolar 1 disorder  Depression  Anxiety:   - continue PTA Buspar, Valium, Cymbalta, Atarax, Lamictal     Diabetes mellitus, non-insulin dependent: a1c 6.9 (3/21/23 )   - PTA on Jardiance, Glipizide and Metformin  - blood glucose elevated likely secondary to concomitant prednisone  - BG in 300s; glipizide resumed 3/24, will also resume PTA Jardiance; continue with sliding scale insulin; continue to hold PTA metformin  - will switch diet to moderate consistent diet  - continue sliding scale insulin high resistance with hypoglycemia protocol    Hypertension  Chronic SAMMY:   - holding PTA Dyazide; BP stable; will continue to hold for now and will plan to resume as needed     Dyspepsia  GERD: continue Protonix       Chronic, stable medical conditions  Hard of hearing  COPD  Asthma  Irritable bowel syndrome  Meniere disease  Overactive bladder: continue PTA Ditropan    Diet: Advance Diet as Tolerated: Regular Diet Adult  Diet         DVT Prophylaxis: per primary; PCDs    Code status: full code    Disposition: per orthopedics primary;  medically stable; SW following for disposition to TCU    Clinically Significant Risk Factors                        # DMII: A1C = 6.9 % (Ref range: <5.7 %) within past 6 months, PRESENT ON ADMISSION  # Obesity: Estimated body mass index is 31.5 kg/m  as calculated from the following:    Height as of this encounter: 1.651 m (5' 5\").    Weight as of this encounter: 85.9 kg (189 lb 4.8 oz)., PRESENT ON ADMISSION            Page Me (7 am to 6 pm)    Care plan discussed with patient and nursing              Physical Exam:      Blood pressure 119/55, pulse 72, temperature 98.5  F (36.9  C), temperature source Oral, resp. rate 16, height 1.651 m (5' 5\"), weight 85.9 kg (189 lb 4.8 oz), SpO2 93 %, not currently breastfeeding.  Vitals:    03/21/23 1024   Weight: 85.9 kg (189 lb 4.8 oz)     Vital Signs with Ranges  Temp:  [98.4  F (36.9  C)-99.1  F (37.3 "  C)] 98.5  F (36.9  C)  Pulse:  [72-76] 72  Resp:  [16] 16  BP: (119-143)/(55-76) 119/55  SpO2:  [92 %-95 %] 93 %  I/O's Last 24 hours  I/O last 3 completed shifts:  In: -   Out: 950 [Urine:950]    Constitutional: Alert, awake and oriented ; very hard of hearing (able to lip read)       Oral cavity: Moist mucosa   Cardiovascular: Normal s1 s2, regular rate and rhythm, no murmur   Lungs: B/l clear to auscultation, no wheezes or crepitations   Abdomen: Soft, nt, nd, no guarding, rigidity or rebound; BS +   LE : No edema   Musculoskeletal/Neuro  moving all extremities independently ; left arm in postop bandage /splint ; no focal neurological deficits noted   Psychiatry: slightly anxious                Medications:          acetaminophen  975 mg Oral TID     busPIRone  7.5 mg Oral TID     DULoxetine  60 mg Oral QAM     glipiZIDE  10 mg Oral BID AC     insulin aspart  1-10 Units Subcutaneous TID AC     insulin aspart  1-7 Units Subcutaneous At Bedtime     lamoTRIgine  100 mg Oral BID     oxybutynin ER  15 mg Oral QAM     pantoprazole  40 mg Oral QAM AC     polyethylene glycol  17 g Oral Daily     predniSONE  1 mg Oral QAM     predniSONE  5 mg Oral QAM     pregabalin  50 mg Oral BID     senna-docusate  1 tablet Oral BID     sodium chloride (PF)  3 mL Intracatheter Q8H     PRN Meds: acetaminophen, sore throat, bisacodyl, glucose **OR** dextrose **OR** glucagon, diazepam, hydrOXYzine, lidocaine 4%, lidocaine (buffered or not buffered), magnesium hydroxide, methocarbamol, naloxone **OR** naloxone **OR** naloxone **OR** naloxone, ondansetron **OR** ondansetron, oxyCODONE **OR** oxyCODONE, prochlorperazine **OR** prochlorperazine, sodium chloride (PF)         Data:      All new lab and imaging data was reviewed.   Recent Labs   Lab Test 03/23/23  0848 03/22/23  0753 07/03/22  2204 06/16/22  2302 05/26/22  2045 02/14/17  1541 09/06/16  0000   WBC  --   --  16.2* 8.8 9.5   < >  --    HGB 9.0* 9.3* 10.8* 10.2* 10.3*   < >  --     MCV  --   --  81 84 81   < >  --    PLT  --   --  423 326 385   < >  --    INR  --   --   --  1.02 1.01  --  0.9    < > = values in this interval not displayed.      Recent Labs   Lab Test 03/25/23  0623 03/25/23 0220 03/24/23 2201 03/22/23  0918 03/22/23  0753 03/21/23  1710 03/21/23  1025 07/03/22 2204 06/16/22  2302   0000   NA  --   --   --   --  142  --   --  143 144  --    POTASSIUM  --   --   --   --  3.6  --  3.4 2.9* 3.9  --    CHLORIDE  --   --   --   --  103  --   --  98 103  --    CO2  --   --   --   --  32*  --   --  28 27  --    BUN  --   --   --   --  14.6  --   --  26 20  --    CR  --   --   --   --  0.95  --  0.89 0.95 0.90  --    ANIONGAP  --   --   --   --  7  --   --  17 14  --    MACY  --   --   --   --  9.4  --   --  10.8* 10.0  --    * 146* 122*   < > 164*   < > 102* 128* 135*   < >    < > = values in this interval not displayed.     No lab results found.    Invalid input(s): TROP, TROPONINIES

## 2023-03-25 NOTE — PROGRESS NOTES
Orthopedic Surgery  Jumana Yang  03/25/2023     Admit Date:  3/21/2023    POD: 4 Days Post-Op   Procedure(s):  LEFT OPEN CARPAL TUNNEL RELEASE. LEFT RING FINGER A1 PULLEY RELEASE    Patient anxiously resting in bed. Patient is hard of hearing, reading lips for communication.    Post operative wrist and hand pain controlled with oral pain medications. Patient admits to continued pain in her left and right shoulders. And some itching so we discussed benadryl, she's agreeable.  Tolerating oral intake.    Denies nausea or vomiting.  Denies chest pain or shortness of breath.  No acute events overnight.    Temp:  [98.4  F (36.9  C)-99.1  F (37.3  C)] 98.5  F (36.9  C)  Pulse:  [72-76] 72  Resp:  [16] 16  BP: (119-143)/(55-76) 119/55  SpO2:  [92 %-95 %] 93 %    Alert and oriented. Sitting up in bed  Respiration is regular and non-labored.   Left soft upper extremity dressing is clean, dry, and intact.   Minimal erythema of the surrounding skin.   Left upper extremity is NVI. Sensation to light touch intact.   Passive left elbow and shoulder range of motion is painless.       Labs:  Recent Labs   Lab Test 03/23/23  0848 03/22/23  0753 07/03/22 2204 06/16/22 2302 05/26/22 2045   WBC  --   --  16.2* 8.8 9.5   HGB 9.0* 9.3* 10.8* 10.2* 10.3*   PLT  --   --  423 326 385     Recent Labs   Lab Test 06/16/22 2302 05/26/22 2045 09/06/16  0000   INR 1.02 1.01 0.9     Recent Labs   Lab Test 07/03/22 2204 03/07/22  1354 07/24/21  1234   CRP 0.6 3.5* 0.3       Assessment/Plan     1. Status post left open carpal tunnel release and left ring finger A1 pulley release:      Nonambulatory, transfer with PT/OT - needs to be working with therapy   Advance diabetic diet as tolerated.    Continue current pain regimen with oral Oxycodone every 3 hours as needed. Avoid use of IV Dilaudid.    Dressings: Keep intact for 5 days after surgery. Once dressing is removed, place a band-aid over the incisions.    Follow-up: 2 weeks post-op for  suture removal with Diamond Fuentes PA-C.     2. Disposition   Cleared for discharge. Needs to show willingness to work with therapies for rehab.    Anticipate d/c to TCU when a bed becomes available.     Kjerstin Foss PA-C  [facility]

## 2023-03-25 NOTE — PLAN OF CARE
A&O x4. [place]. CMS intact. Dressing CDI. Pain managed with Oxycodone. BS check, didn't have to give dinner insulin. Voiding adequately via purwick. No IV access. Discharge pending TCU placement.

## 2023-03-25 NOTE — PLAN OF CARE
Pt A/Ox4, VSS on RA, oxycodone for pain, CMS intact, assist of 2, purewick in use, will continue to monitor

## 2023-03-25 NOTE — PLAN OF CARE
Date/Time 3/25/23, 07:00-15:30    Trauma/Ortho/Medical (Choose one) Ortho    Diagnosis: Left open carpal tunnel release  POD#: 4  Mental Status: A&O x4  Activity/dangle assist of 2 with lift/ wheelchair bound at baseline  Diet:mod carb  Pain: oxycodone  Teran/Voiding: external catheter  Tele/Restraints/Iso: no  02/LDA:RA/ no iv access  D/C Date: Pending TCU placement  Other Info: BG check with insulin coverage. Benadryl administered for itching. Dressing CDI. Mepilex on coccyx.

## 2023-03-26 ENCOUNTER — APPOINTMENT (OUTPATIENT)
Dept: OCCUPATIONAL THERAPY | Facility: CLINIC | Age: 83
DRG: 042 | End: 2023-03-26
Attending: ORTHOPAEDIC SURGERY
Payer: MEDICARE

## 2023-03-26 ENCOUNTER — APPOINTMENT (OUTPATIENT)
Dept: PHYSICAL THERAPY | Facility: CLINIC | Age: 83
DRG: 042 | End: 2023-03-26
Attending: ORTHOPAEDIC SURGERY
Payer: MEDICARE

## 2023-03-26 LAB
GLUCOSE BLDC GLUCOMTR-MCNC: 122 MG/DL (ref 70–99)
GLUCOSE BLDC GLUCOMTR-MCNC: 146 MG/DL (ref 70–99)
GLUCOSE BLDC GLUCOMTR-MCNC: 169 MG/DL (ref 70–99)
GLUCOSE BLDC GLUCOMTR-MCNC: 91 MG/DL (ref 70–99)

## 2023-03-26 PROCEDURE — 120N000001 HC R&B MED SURG/OB

## 2023-03-26 PROCEDURE — 250N000013 HC RX MED GY IP 250 OP 250 PS 637: Performed by: HOSPITALIST

## 2023-03-26 PROCEDURE — 250N000013 HC RX MED GY IP 250 OP 250 PS 637: Performed by: PHYSICIAN ASSISTANT

## 2023-03-26 PROCEDURE — 250N000013 HC RX MED GY IP 250 OP 250 PS 637: Performed by: CLINICAL NURSE SPECIALIST

## 2023-03-26 PROCEDURE — 97535 SELF CARE MNGMENT TRAINING: CPT | Mod: GO | Performed by: OCCUPATIONAL THERAPIST

## 2023-03-26 PROCEDURE — 250N000012 HC RX MED GY IP 250 OP 636 PS 637: Performed by: PHYSICIAN ASSISTANT

## 2023-03-26 PROCEDURE — 250N000013 HC RX MED GY IP 250 OP 250 PS 637: Performed by: ORTHOPAEDIC SURGERY

## 2023-03-26 PROCEDURE — 99232 SBSQ HOSP IP/OBS MODERATE 35: CPT | Performed by: HOSPITALIST

## 2023-03-26 PROCEDURE — 97530 THERAPEUTIC ACTIVITIES: CPT | Mod: GP

## 2023-03-26 PROCEDURE — 250N000013 HC RX MED GY IP 250 OP 250 PS 637: Performed by: STUDENT IN AN ORGANIZED HEALTH CARE EDUCATION/TRAINING PROGRAM

## 2023-03-26 PROCEDURE — 97161 PT EVAL LOW COMPLEX 20 MIN: CPT | Mod: GP

## 2023-03-26 RX ORDER — DIPHENHYDRAMINE HCL 12.5MG/5ML
12.5 LIQUID (ML) ORAL EVERY 4 HOURS PRN
Status: DISCONTINUED | OUTPATIENT
Start: 2023-03-26 | End: 2023-03-27 | Stop reason: HOSPADM

## 2023-03-26 RX ADMIN — DULOXETINE HYDROCHLORIDE 60 MG: 60 CAPSULE, DELAYED RELEASE ORAL at 08:57

## 2023-03-26 RX ADMIN — INSULIN ASPART 2 UNITS: 100 INJECTION, SOLUTION INTRAVENOUS; SUBCUTANEOUS at 13:12

## 2023-03-26 RX ADMIN — DIPHENHYDRAMINE HYDROCHLORIDE 12.5 MG: 25 SOLUTION ORAL at 23:07

## 2023-03-26 RX ADMIN — ACETAMINOPHEN 975 MG: 325 TABLET, FILM COATED ORAL at 08:56

## 2023-03-26 RX ADMIN — PANTOPRAZOLE SODIUM 40 MG: 40 TABLET, DELAYED RELEASE ORAL at 06:44

## 2023-03-26 RX ADMIN — PREDNISONE 5 MG: 5 TABLET ORAL at 08:58

## 2023-03-26 RX ADMIN — LAMOTRIGINE 100 MG: 100 TABLET ORAL at 08:58

## 2023-03-26 RX ADMIN — METFORMIN HYDROCHLORIDE 500 MG: 500 TABLET ORAL at 17:48

## 2023-03-26 RX ADMIN — ACETAMINOPHEN 975 MG: 325 TABLET, FILM COATED ORAL at 21:14

## 2023-03-26 RX ADMIN — OXYBUTYNIN CHLORIDE 15 MG: 10 TABLET, EXTENDED RELEASE ORAL at 08:56

## 2023-03-26 RX ADMIN — LAMOTRIGINE 100 MG: 100 TABLET ORAL at 21:14

## 2023-03-26 RX ADMIN — SENNOSIDES AND DOCUSATE SODIUM 1 TABLET: 50; 8.6 TABLET ORAL at 21:14

## 2023-03-26 RX ADMIN — HYDROXYZINE HYDROCHLORIDE 10 MG: 10 TABLET ORAL at 03:38

## 2023-03-26 RX ADMIN — GLIPIZIDE 10 MG: 10 TABLET ORAL at 17:48

## 2023-03-26 RX ADMIN — OXYCODONE HYDROCHLORIDE 10 MG: 5 TABLET ORAL at 21:14

## 2023-03-26 RX ADMIN — OXYCODONE HYDROCHLORIDE 10 MG: 5 TABLET ORAL at 18:01

## 2023-03-26 RX ADMIN — PREGABALIN 50 MG: 50 CAPSULE ORAL at 08:57

## 2023-03-26 RX ADMIN — EMPAGLIFLOZIN 25 MG: 25 TABLET, FILM COATED ORAL at 08:57

## 2023-03-26 RX ADMIN — BUSPIRONE HYDROCHLORIDE 7.5 MG: 5 TABLET ORAL at 17:48

## 2023-03-26 RX ADMIN — GLIPIZIDE 10 MG: 10 TABLET ORAL at 08:57

## 2023-03-26 RX ADMIN — BUSPIRONE HYDROCHLORIDE 7.5 MG: 5 TABLET ORAL at 08:57

## 2023-03-26 RX ADMIN — DIPHENHYDRAMINE HYDROCHLORIDE 12.5 MG: 25 SOLUTION ORAL at 03:30

## 2023-03-26 RX ADMIN — BUSPIRONE HYDROCHLORIDE 7.5 MG: 5 TABLET ORAL at 21:14

## 2023-03-26 RX ADMIN — OXYCODONE HYDROCHLORIDE 10 MG: 5 TABLET ORAL at 08:57

## 2023-03-26 RX ADMIN — SENNOSIDES AND DOCUSATE SODIUM 1 TABLET: 50; 8.6 TABLET ORAL at 08:58

## 2023-03-26 RX ADMIN — OXYCODONE HYDROCHLORIDE 10 MG: 5 TABLET ORAL at 03:38

## 2023-03-26 RX ADMIN — PREDNISONE 1 MG: 1 TABLET ORAL at 08:58

## 2023-03-26 RX ADMIN — POLYETHYLENE GLYCOL 3350 17 G: 17 POWDER, FOR SOLUTION ORAL at 08:56

## 2023-03-26 RX ADMIN — METFORMIN HYDROCHLORIDE 500 MG: 500 TABLET ORAL at 13:12

## 2023-03-26 RX ADMIN — ACETAMINOPHEN 975 MG: 325 TABLET, FILM COATED ORAL at 17:48

## 2023-03-26 RX ADMIN — PREGABALIN 50 MG: 50 CAPSULE ORAL at 21:14

## 2023-03-26 RX ADMIN — INSULIN ASPART 1 UNITS: 100 INJECTION, SOLUTION INTRAVENOUS; SUBCUTANEOUS at 18:02

## 2023-03-26 RX ADMIN — OXYCODONE HYDROCHLORIDE 10 MG: 5 TABLET ORAL at 00:11

## 2023-03-26 ASSESSMENT — ACTIVITIES OF DAILY LIVING (ADL)
ADLS_ACUITY_SCORE: 40
ADLS_ACUITY_SCORE: 44
ADLS_ACUITY_SCORE: 40
ADLS_ACUITY_SCORE: 44
ADLS_ACUITY_SCORE: 40
ADLS_ACUITY_SCORE: 44
ADLS_ACUITY_SCORE: 44
ADLS_ACUITY_SCORE: 40
ADLS_ACUITY_SCORE: 40

## 2023-03-26 NOTE — PROGRESS NOTES
[facility]  Hospitalist Progress Note        Kalen Soares MD   03/26/2023        Interval History:        - Reports some itching mostly at the surgical site; using PRN Atarax and calamine lotion; also has benadryl prn  - BG better to 130s---160s; will resume PTA metformin as well and continue with sliding scale insulin along with PTA glipizide and Jardiance         Assessment and Plan:        Jumana Yang is a 82 year old female who is extremely hard of hearing (but able to lip read) with PMHx chronic pain, h/o severe spinal stenosis (follows pain clinic), anxiety, depression, bipolar, diabetes mellitus type II, hypertension, COPD, asthma, irritable bowel syndrome who was admitted on 3/21/2023 by Ortho for carpal tunnel release. Hospitalist service was consulted for medical co-management      S/P left open carpal tunnel release (3/21/23):   - Surgery per Dr. Hamilton. MAC with local anesthesia used. EBL 2 ccs.   -- Defer routine post-operative cares, IVF, DVT prophylaxis and pain control to primary service  - Evaluated by pain consult team as well   -- Treated with periop Ancef   -- Encourage pulmonary toilet; incentive spirometer at bedside   -- Bowel regimen in place while on narcotics   - therapy rec TCU  - post op Hb 9.3---9.0    Chronic pain  Severe spinal stenosis had steroid injections in her back, follows up with pain clinic  Arthritis  Neuropathy  - patient's PTA regimen includes Robaxin 500 mg TID PRN , Percocet 1 tablet Q6 hours PRN, prednisone 6 mg daily, Lyrica 50 mg BID  - continue PTA Lyrica,  prednisone 6 mg po every day and prn Robaxin  - plan for narcotic pain regimen for postop pain as noted above, per primary  -continue PTA Calamine lotion for itching; also has prn atarax and Benadryl available     Bipolar 1 disorder  Depression  Anxiety:   - continue PTA Buspar, Cymbalta, Lamictal; also has prn atarax and prn valium     Diabetes mellitus, non-insulin dependent: a1c 6.9  "(3/21/23 )   - PTA on Jardiance, Glipizide and Metformin  - blood glucose elevated likely secondary to concomitant prednisone  - BG better with resumption of PTA OHAs and switching diet to moderate consistent carbs  - will resume PTA metformin as well (3/26) and continue with sliding scale insulin along with PTA glipizide and Jardiance  - continue sliding scale insulin high resistance with hypoglycemia protocol    Hypertension  Chronic SAMMY:   - holding PTA Dyazide; BP stable off meds; will continue to hold for now and will plan to resume as needed     Dyspepsia  GERD: continue Protonix       Chronic, stable medical conditions  Hard of hearing  COPD  Asthma  Irritable bowel syndrome  Meniere disease  Overactive bladder: continue PTA Ditropan    Diet: Diet  Moderate Consistent Carb (60 g CHO per Meal) Diet         DVT Prophylaxis: per primary; PCDs    Code status: full code    Disposition: per orthopedics primary;  medically stable; SW following for disposition to TCU    Clinically Significant Risk Factors                        # DMII: A1C = 6.9 % (Ref range: <5.7 %) within past 6 months   # Obesity: Estimated body mass index is 31.5 kg/m  as calculated from the following:    Height as of this encounter: 1.651 m (5' 5\").    Weight as of this encounter: 85.9 kg (189 lb 4.8 oz).             Page Me (7 am to 6 pm)    Care plan discussed with patient and nursing              Physical Exam:      Blood pressure 100/73, pulse 77, temperature 98.4  F (36.9  C), temperature source Oral, resp. rate 16, height 1.651 m (5' 5\"), weight 85.9 kg (189 lb 4.8 oz), SpO2 93 %, not currently breastfeeding.  Vitals:    03/21/23 1024   Weight: 85.9 kg (189 lb 4.8 oz)     Vital Signs with Ranges  Temp:  [98.4  F (36.9  C)-99.9  F (37.7  C)] 98.4  F (36.9  C)  Pulse:  [72-80] 77  Resp:  [14-18] 16  BP: ()/(55-73) 100/73  SpO2:  [91 %-94 %] 93 %  I/O's Last 24 hours  I/O last 3 completed shifts:  In: -   Out: 350 " [Urine:350]    Constitutional: Alert, awake and oriented ; very hard of hearing (able to lip read)       Oral cavity: Moist mucosa   Cardiovascular: Normal s1 s2, regular rate and rhythm, no murmur   Lungs: B/l clear to auscultation, no wheezes or crepitations   Abdomen: Soft, nt, nd, no guarding, rigidity or rebound; BS +   LE : No edema   Musculoskeletal/Neuro  moving all extremities independently ; left arm in postop bandage /splint ; no focal neurological deficits noted   Psychiatry: slightly anxious                Medications:          acetaminophen  975 mg Oral TID     busPIRone  7.5 mg Oral TID     DULoxetine  60 mg Oral QAM     empagliflozin  25 mg Oral QAM     glipiZIDE  10 mg Oral BID AC     insulin aspart  1-10 Units Subcutaneous TID AC     insulin aspart  1-7 Units Subcutaneous At Bedtime     lamoTRIgine  100 mg Oral BID     oxybutynin ER  15 mg Oral QAM     pantoprazole  40 mg Oral QAM AC     polyethylene glycol  17 g Oral Daily     predniSONE  1 mg Oral QAM     predniSONE  5 mg Oral QAM     pregabalin  50 mg Oral BID     senna-docusate  1 tablet Oral BID     sodium chloride (PF)  3 mL Intracatheter Q8H     PRN Meds: acetaminophen, sore throat, bisacodyl, calamine, glucose **OR** dextrose **OR** glucagon, diazepam, diphenhydrAMINE, hydrOXYzine, lidocaine 4%, lidocaine (buffered or not buffered), magnesium hydroxide, methocarbamol, naloxone **OR** naloxone **OR** naloxone **OR** naloxone, ondansetron **OR** ondansetron, oxyCODONE **OR** oxyCODONE, prochlorperazine **OR** prochlorperazine, sodium chloride (PF)         Data:      All new lab and imaging data was reviewed.   Recent Labs   Lab Test 03/23/23  0848 03/22/23  0753 07/03/22  2204 06/16/22  2302 05/26/22  2045 02/14/17  1541 09/06/16  0000   WBC  --   --  16.2* 8.8 9.5   < >  --    HGB 9.0* 9.3* 10.8* 10.2* 10.3*   < >  --    MCV  --   --  81 84 81   < >  --    PLT  --   --  423 853 385   < >  --    INR  --   --   --  1.02 1.01  --  0.9    < > =  values in this interval not displayed.      Recent Labs   Lab Test 03/25/23  2106 03/25/23  1718 03/25/23  1116 03/22/23  0918 03/22/23  0753 03/21/23  1710 03/21/23  1025 07/03/22  2204 06/16/22  2302   0000   NA  --   --   --   --  142  --   --  143 144  --    POTASSIUM  --   --   --   --  3.6  --  3.4 2.9* 3.9  --    CHLORIDE  --   --   --   --  103  --   --  98 103  --    CO2  --   --   --   --  32*  --   --  28 27  --    BUN  --   --   --   --  14.6  --   --  26 20  --    CR  --   --   --   --  0.95  --  0.89 0.95 0.90  --    ANIONGAP  --   --   --   --  7  --   --  17 14  --    MACY  --   --   --   --  9.4  --   --  10.8* 10.0  --    * 157* 371*   < > 164*   < > 102* 128* 135*   < >    < > = values in this interval not displayed.     No lab results found.    Invalid input(s): TROP, TROPONINIES

## 2023-03-26 NOTE — PROGRESS NOTES
"   03/26/23 1124   Appointment Info   Signing Clinician's Name / Credentials (PT) Yoly Last DPCRISTINA   Living Environment   People in Home alone   Current Living Arrangements apartment   Home Accessibility no concerns   Transportation Anticipated public transportation;health plan transportation;family or friend will provide   Living Environment Comments Walk-in shower w/3\" lip, handicapped ht toilet   Self-Care   Usual Activity Tolerance fair   Current Activity Tolerance poor   Equipment Currently Used at Home grab bar, toilet;grab bar, tub/shower;tub bench;walker, rolling;wheelchair, manual   Fall history within last six months yes   Number of times patient has fallen within last six months 5   Activity/Exercise/Self-Care Comment Per son: pt's falls at least 6mos ago when using walker to amb. Since then relies on wheelchair only for mob and no further falls since then.  At baseline prior to onset of L wrist issue and surgery, pt indep with wc transfers, dressing, toileting, bathing and meal prep. She has PCA 2-3X week for household tasks. She is indep with meds and finances but does not drive.   General Information   Onset of Illness/Injury or Date of Surgery 03/21/23   Referring Physician Claire Hamilton MD   Patient/Family Therapy Goals Statement (PT) Return home   Pertinent History of Current Problem (include personal factors and/or comorbidities that impact the POC) Jumana Yang is a 82 year old female POD#5 S/P left open carpal tunnel release, who is extremely hard of hearing (but able to lip read) with PMHx chronic pain, h/o severe spinal stenosis (follows pain clinic), anxiety, depression, bipolar, diabetes mellitus type II, hypertension, COPD, asthma, irritable bowel syndrome who was admitted on 3/21/2023 by Ortho for carpal tunnel release.   Existing Precautions/Restrictions fall;weight bearing  (Per orders \"No pushing, pulling, lifting, or torquing more than 2 lb.  Typing, writing, and light " "activities with the fingers is okay. Left hand should not be used in transferring.\")   Weight-Bearing Status - LUE nonweight-bearing   General Observations Pt supine in bed upon therapist arrival, agreeable to PT. Pt very [place]   Cognition   Affect/Mental Status (Cognition) WFL   Orientation Status (Cognition) oriented x 4   Follows Commands (Cognition) WFL   Pain Assessment   Patient Currently in Pain   (5/10 L hand)   Integumentary/Edema   Integumentary/Edema Comments L UE wrapped   Posture    Posture Forward head position;Protracted shoulders   Range of Motion (ROM)   Range of Motion ROM deficits secondary to surgical procedure   Strength (Manual Muscle Testing)   Strength (Manual Muscle Testing) Deficits observed during functional mobility   Bed Mobility   Comment, (Bed Mobility) Supine to sit Min A   Transfers   Comment, (Transfers) Sit to stand Min-Mod A x2 w/ Caitlin Stedy   Gait/Stairs (Locomotion)   Comment, (Gait/Stairs) Pt WC bound at baseline   Balance   Balance Comments Fair seated and poor standing   Sensory Examination   Sensory Perception patient reports no sensory changes   Clinical Impression   Criteria for Skilled Therapeutic Intervention Yes, treatment indicated   PT Diagnosis (PT) Impaired functional mobility   Influenced by the following impairments Pain, weakness, decreased activity tolerance, impaired balance   Functional limitations due to impairments Limited functional mobility requiring AD and assist   Clinical Presentation (PT Evaluation Complexity) Stable/Uncomplicated   Clinical Presentation Rationale Based on PMH, current status, and social support   Clinical Decision Making (Complexity) low complexity   Planned Therapy Interventions (PT) balance training;bed mobility training;strengthening;transfer training;progressive activity/exercise;wheelchair management/propulsion training   Risk & Benefits of therapy have been explained evaluation/treatment results reviewed;care plan/treatment goals " reviewed;risks/benefits reviewed;current/potential barriers reviewed;participants voiced agreement with care plan;participants included;patient   PT Total Evaluation Time   PT Eval, Low Complexity Minutes (32266) 15   Plan of Care Review   Plan of Care Reviewed With patient   Physical Therapy Goals   PT Frequency 5x/week   PT Predicted Duration/Target Date for Goal Attainment 04/02/23   PT Goals Bed Mobility;Transfers   PT: Bed Mobility Supervision/stand-by assist;Supine to/from sit;Within precautions   PT: Transfers Supervision/stand-by assist;Sit to/from stand;Bed to/from chair;Assistive device;Within precautions   Interventions   Interventions Quick Adds Therapeutic Activity   Therapeutic Activity   Therapeutic Activities: dynamic activities to improve functional performance Minutes (96195) 40   Symptoms Noted During/After Treatment Fatigue;Increased pain   Treatment Detail/Skilled Intervention Pt very [place], per RN has been able to lip read, therapist using clear mask, pt reporting she is having difficulty lip reading. Therapist using speech to text on phone, pt repotrs preferring this method. Required extra time throughout session d/t communication difficulties. Therapist reviewed L UE precautions. Pt rolled side to side w/ Min A to place brief. Pt sat EOB w/ SBA, pt very anxious, required encouragement and reassurance. Pt required frequent cues and reminders not to use L UE during transfers. Therapist set up SS, assist from 2 aides for communication and hands on assist. Pt transferred over to recliner. Therapist placed pillow under L UE to encourage pt to let arm rest during transfers. Pt performed sit <> stand from SS paddles w/ Min Ax2, pt still placing weight through L UE, repeated cues from therapist for NWB. Aide assisting in holding L UE to help pt remain NWB. Pt performed sit <> stand from SS paddles x3 reps w/ CGA-Min A x2. Pt reported fatigue, sat in recliner w/ Min A. Pt in recliner at end of session,  feet elevated, all needs w/in reach, pillow support, RN updated.   PT Discharge Planning   PT Plan Progress bed mob and transfers, review UE precautions   PT Discharge Recommendation (DC Rec) Transitional Care Facility   PT Rationale for DC Rec Pt below baseline, currently Ax1-2 for bed mob and transfers. Recommend TCU to increase strength and functional mobility.   PT Brief overview of current status Bed mob Min A, transfers Min-Mod A x2 w/ SS   Total Session Time   Timed Code Treatment Minutes 40   Total Session Time (sum of timed and untimed services) 55

## 2023-03-26 NOTE — PROGRESS NOTES
Care Management Follow Up    Length of Stay (days): 5    Expected Discharge Date: 03/26/2023     Concerns to be Addressed:     Discharge planning  Patient plan of care discussed at interdisciplinary rounds: Yes    Anticipated Discharge Disposition: Transitional Care     Anticipated Discharge Services: Transportation Services, therapy  Anticipated Discharge DME: None    Patient/family educated on Medicare website which has current facility and service quality ratings: no  Education Provided on the Discharge Plan:  no  Patient/Family in Agreement with the Plan: yes    Referrals Placed by CM/SW:  TCU referrals  Private pay costs discussed: Not applicable    Additional Information:  Call placed to [facility] to follow up on the referral.  Per Jennifer, she wanted to know if patient was participating in therapy.  She states she will have Renee look at the referral on Monday.  Received a message from patient's son asking for the referral to be sent to [facility] and [facility].  Referrals sent to both facilities, via the discharge navigator.    Will continue to follow.      OTILIA Belle, [facility]    [phone]  [facility]

## 2023-03-26 NOTE — PROGRESS NOTES
Orthopedic Surgery  Jumana Yang  03/26/2023     Admit Date:  3/21/2023    POD: 5 Days Post-Op   Procedure(s):  LEFT OPEN CARPAL TUNNEL RELEASE. LEFT RING FINGER A1 PULLEY RELEASE    Patient anxiously resting in bed. Patient is hard of hearing, reading lips for communication.    Post operative wrist and hand pain controlled with oral pain medications. Continued itching most notable left forearm; remains agreeable to benadryl.   Tolerating oral intake.    No report of nausea, vomiting, chest pain or shortness of breath.  No acute events overnight.    Temp:  [98.4  F (36.9  C)-99.9  F (37.7  C)] 98.4  F (36.9  C)  Pulse:  [75-80] 77  Resp:  [14-18] 16  BP: ()/(62-73) 100/73  SpO2:  [91 %-94 %] 93 %    Alert and interactive. Sitting up in bed.   Respiration is regular and non-labored.   Left soft upper extremity dressing is clean, dry, and intact.   Unable to fully examine skin underneath dressing. Minimal to no erythema of visible skin. Nursing staff plans to redress the hand today and will notify us with any concern for infection or worsening rash.   Moving all digits of L hand independently without issue.   Left upper extremity is NVI. Sensation to light touch intact.   Passive left elbow and shoulder range of motion is painless.     Labs:  Recent Labs   Lab Test 03/23/23  0848 03/22/23  0753 07/03/22 2204 06/16/22  2302 05/26/22  2045   WBC  --   --  16.2* 8.8 9.5   HGB 9.0* 9.3* 10.8* 10.2* 10.3*   PLT  --   --  423 326 385     Recent Labs   Lab Test 06/16/22  2302 05/26/22  2045 09/06/16  0000   INR 1.02 1.01 0.9     Recent Labs   Lab Test 07/03/22 2204 03/07/22  1354 07/24/21  1234   CRP 0.6 3.5* 0.3       Assessment/Plan     1. Status post left open carpal tunnel release and left ring finger A1 pulley release:      Nonambulatory, transfer with PT/OT - needs to be working with therapy   Advance diabetic diet as tolerated.    Continue current pain regimen with oral Oxycodone every 3 hours as needed. Avoid  use of IV Dilaudid.    Dressings: Keep intact for 5 days after surgery. Once dressing is removed, place a band-aid over the incisions. Nursing staff will redress L hand today in the setting of itching.   Continue calamine lotion; avoid direct placement on incision. Continue Benadryl; order changed to q4h PRN.    Follow-up: 2 weeks post-op for suture removal with Diamond Fuentes PA-C.     2. Disposition   Cleared for discharge. Needs to show willingness to work with therapies for rehab.    Anticipate d/c to TCU when a bed becomes available.     Jo Walters PA-C  [facility]

## 2023-03-26 NOTE — PLAN OF CARE
Pt A/Ox4, VSS on RA, oxycodone for pain, CMS intact, assist of 2, purewick in use, calamine lotion to help with itching, ace bandage removed due to itching and rash underneath, will continue to monitor

## 2023-03-26 NOTE — PLAN OF CARE
Diagnosis: Left open carpal tunnel release  POD#:5  Mental Status: a&o x4.   Activity/dangle assist of 2 with andrey steady/ wheelchair bound at baseline  Diet: mod carb  Pain: oxycodone and tylenol  Teran/Voiding: external catheter  Tele/Restraints/Iso: no  02/LDA: RA/ no iv access  D/C Date: TCU pending placement  Other Info: Ace wrap ressing CDI. Calamine lotion to help with rash on back and left forearm.

## 2023-03-27 ENCOUNTER — LAB REQUISITION (OUTPATIENT)
Dept: LAB | Facility: CLINIC | Age: 83
End: 2023-03-27
Payer: MEDICARE

## 2023-03-27 VITALS
TEMPERATURE: 98.5 F | SYSTOLIC BLOOD PRESSURE: 153 MMHG | BODY MASS INDEX: 31.54 KG/M2 | RESPIRATION RATE: 17 BRPM | WEIGHT: 189.3 LBS | OXYGEN SATURATION: 94 % | DIASTOLIC BLOOD PRESSURE: 80 MMHG | HEART RATE: 77 BPM | HEIGHT: 65 IN

## 2023-03-27 DIAGNOSIS — Z11.1 ENCOUNTER FOR SCREENING FOR RESPIRATORY TUBERCULOSIS: ICD-10-CM

## 2023-03-27 DIAGNOSIS — U07.1 COVID-19: ICD-10-CM

## 2023-03-27 LAB
GLUCOSE BLDC GLUCOMTR-MCNC: 108 MG/DL (ref 70–99)
GLUCOSE BLDC GLUCOMTR-MCNC: 120 MG/DL (ref 70–99)

## 2023-03-27 PROCEDURE — 250N000013 HC RX MED GY IP 250 OP 250 PS 637: Performed by: CLINICAL NURSE SPECIALIST

## 2023-03-27 PROCEDURE — 250N000013 HC RX MED GY IP 250 OP 250 PS 637: Performed by: PHYSICIAN ASSISTANT

## 2023-03-27 PROCEDURE — 250N000013 HC RX MED GY IP 250 OP 250 PS 637: Performed by: STUDENT IN AN ORGANIZED HEALTH CARE EDUCATION/TRAINING PROGRAM

## 2023-03-27 PROCEDURE — 250N000013 HC RX MED GY IP 250 OP 250 PS 637: Performed by: HOSPITALIST

## 2023-03-27 PROCEDURE — 250N000012 HC RX MED GY IP 250 OP 636 PS 637: Performed by: PHYSICIAN ASSISTANT

## 2023-03-27 PROCEDURE — 250N000013 HC RX MED GY IP 250 OP 250 PS 637: Performed by: ORTHOPAEDIC SURGERY

## 2023-03-27 PROCEDURE — 99232 SBSQ HOSP IP/OBS MODERATE 35: CPT | Performed by: HOSPITALIST

## 2023-03-27 RX ORDER — PREGABALIN 50 MG/1
50 CAPSULE ORAL 2 TIMES DAILY
Qty: 10 CAPSULE | Refills: 0 | Status: ON HOLD | OUTPATIENT
Start: 2023-03-27 | End: 2023-04-01

## 2023-03-27 RX ORDER — DIPHENHYDRAMINE HCL 12.5MG/5ML
12.5 LIQUID (ML) ORAL EVERY 4 HOURS PRN
DISCHARGE
Start: 2023-03-27 | End: 2023-03-29

## 2023-03-27 RX ADMIN — PREDNISONE 1 MG: 1 TABLET ORAL at 08:57

## 2023-03-27 RX ADMIN — SENNOSIDES AND DOCUSATE SODIUM 1 TABLET: 50; 8.6 TABLET ORAL at 08:57

## 2023-03-27 RX ADMIN — GLIPIZIDE 10 MG: 10 TABLET ORAL at 07:55

## 2023-03-27 RX ADMIN — LAMOTRIGINE 100 MG: 100 TABLET ORAL at 08:57

## 2023-03-27 RX ADMIN — PREGABALIN 50 MG: 50 CAPSULE ORAL at 08:55

## 2023-03-27 RX ADMIN — EMPAGLIFLOZIN 25 MG: 25 TABLET, FILM COATED ORAL at 08:57

## 2023-03-27 RX ADMIN — BUSPIRONE HYDROCHLORIDE 7.5 MG: 5 TABLET ORAL at 08:56

## 2023-03-27 RX ADMIN — OXYCODONE HYDROCHLORIDE 10 MG: 5 TABLET ORAL at 07:54

## 2023-03-27 RX ADMIN — METFORMIN HYDROCHLORIDE 500 MG: 500 TABLET ORAL at 08:55

## 2023-03-27 RX ADMIN — POLYETHYLENE GLYCOL 3350 17 G: 17 POWDER, FOR SOLUTION ORAL at 08:57

## 2023-03-27 RX ADMIN — PANTOPRAZOLE SODIUM 40 MG: 40 TABLET, DELAYED RELEASE ORAL at 06:38

## 2023-03-27 RX ADMIN — OXYBUTYNIN CHLORIDE 15 MG: 10 TABLET, EXTENDED RELEASE ORAL at 08:57

## 2023-03-27 RX ADMIN — DIPHENHYDRAMINE HYDROCHLORIDE 12.5 MG: 25 SOLUTION ORAL at 10:10

## 2023-03-27 RX ADMIN — PREDNISONE 5 MG: 5 TABLET ORAL at 08:55

## 2023-03-27 RX ADMIN — DULOXETINE HYDROCHLORIDE 60 MG: 60 CAPSULE, DELAYED RELEASE ORAL at 08:57

## 2023-03-27 RX ADMIN — ACETAMINOPHEN 975 MG: 325 TABLET, FILM COATED ORAL at 08:58

## 2023-03-27 ASSESSMENT — ACTIVITIES OF DAILY LIVING (ADL)
ADLS_ACUITY_SCORE: 40

## 2023-03-27 NOTE — PLAN OF CARE
Occupational Therapy Discharge Summary    Reason for therapy discharge:    Discharged to transitional care facility.    Progress towards therapy goal(s). See goals on Care Plan in [facility] electronic health record for goal details.  Goals not met.  Barriers to achieving goals:   limited tolerance for therapy and discharge from facility.    Therapy recommendation(s):    Continued therapy is recommended.  Rationale/Recommendations:  pt is below baseline, will benefit from continued therapy to progress to PLOF. Difficulty maintaining NWB LUE.

## 2023-03-27 NOTE — PROGRESS NOTES
Orthopedic Surgery  Jumana Yang  3/27/2023  Admit Date:  3/21/2023  POD 6 Days Post-Op  S/P Procedure(s):  LEFT OPEN CARPAL TUNNEL RELEASE. LEFT RING FINGER A1 PULLEY RELEASE    Patient sitting comfortably in bed. Patient is hard of hearing, reading lips for communication.    Post operative wrist and hand pain controlled with oral pain medications. Patient reports itching which she has been taking benadryl and using calamine lotion.  Dressing was changed over weekend but she prefers to continue wearing ACE wrap for protection and comfort.  Tolerating oral intake.    Denies nausea or vomiting.  Denies chest pain or shortness of breath.  No acute events overnight.  Awaiting placement    Alert and orient to person, place, and time.  Vital Sign Ranges  Temperature Temp  Av.6  F (37  C)  Min: 98.5  F (36.9  C)  Max: 98.9  F (37.2  C)   Blood pressure Systolic (24hrs), Av , Min:124 , Max:155        Diastolic (24hrs), Av, Min:56, Max:80      Pulse Pulse  Av  Min: 75  Max: 88   Respirations Resp  Av.8  Min: 17  Max: 18   Pulse oximetry SpO2  Av.3 %  Min: 92 %  Max: 97 %       Alert and oriented. Sitting up in bed  Respiration is regular and non-labored.   Left soft upper extremity dressing is clean, dry, and intact.   Minimal erythema of the surrounding skin.   Left upper extremity is NVI. Sensation to light touch intact.   Able to actively move left shoulder and elbow this morning    Labs:  Recent Labs   Lab Test 23  0753 23  1025 22  2204   POTASSIUM 3.6 3.4 2.9*     Recent Labs   Lab Test 23  0848 23  0753 22  2204   HGB 9.0* 9.3* 10.8*     Recent Labs   Lab Test 22  2302 22  0000   INR 1.02 1.01 0.9     Recent Labs   Lab Test 22  2204 22  2302 22  2045    326 385       A/P  1. Status post left open carpal tunnel release and left ring finger A1 pulley release:                 Nonambulatory, transfer with  PT/OT - needs to be working with  therapy              Advance diabetic diet as tolerated.               Continue current pain regimen with oral Oxycodone every 3  hours as needed. Avoid use of IV Dilaudid.               Dressings: patient prefers to keep ace wrap on for comfort              Follow-up: 2 weeks post-op for suture removal with Diamond Fuentes PA-C.      2. Disposition              Cleared for discharge              Anticipate d/c to TCU when a bed becomes available.     Jenise Sawyer PA-C

## 2023-03-27 NOTE — PLAN OF CARE
Pt A/Ox4, VSS on RA, oxycodone for pain, CMS intact, assist of 2, purewick in use, calamine lotion to help with itching, will continue to monitor

## 2023-03-27 NOTE — PROGRESS NOTES
[facility]  Hospitalist Progress Note        Kalen Soares MD   03/27/2023        Interval History:        -No acute issues overnight  -blood glucose better in 100s to 150s with resumption of her PTA OHAs  -blood pressure elevated in 130s to 150s, will resume PTA Dyazide  -planned for discharge to TCU today         Assessment and Plan:        Jumana Yang is a 82 year old female who is extremely hard of hearing (but able to lip read) with PMHx chronic pain, h/o severe spinal stenosis (follows pain clinic), anxiety, depression, bipolar, diabetes mellitus type II, hypertension, COPD, asthma, irritable bowel syndrome who was admitted on 3/21/2023 by Ortho for carpal tunnel release. Hospitalist service was consulted for medical co-management      S/P left open carpal tunnel release (3/21/23):   - Surgery per Dr. Hamilton. MAC with local anesthesia used. EBL 2 ccs.   -- Defer routine post-operative cares, IVF, DVT prophylaxis and pain control to primary service; on oxycodone PRN  - Evaluated by pain consult team as well   -- Treated with periop Ancef   -- Encourage pulmonary toilet; incentive spirometer at bedside   -- Bowel regimen in place while on narcotics   - therapy rec TCU  - post op Hb 9.3---9.0    Chronic pain  Severe spinal stenosis had steroid injections in her back, follows up with pain clinic  Arthritis  Neuropathy  - patient's PTA regimen includes Robaxin 500 mg TID PRN , Percocet 1 tablet Q6 hours PRN, prednisone 6 mg daily, Lyrica 50 mg BID  - continue PTA Lyrica,  prednisone 6 mg po every day and prn Robaxin  - plan for narcotic pain regimen for postop pain as noted above, per primary  - continue PTA Calamine lotion for itching; also has prn atarax and Benadryl available     Bipolar 1 disorder  Depression  Anxiety:   - continue PTA Buspar, Cymbalta, Lamictal; also has prn atarax and prn valium     Diabetes mellitus, non-insulin dependent: a1c 6.9 (3/21/23 )   - PTA on Jardiance,  "Glipizide and Metformin  - blood glucose elevated likely secondary to concomitant prednisone  - BG better with resumption of PTA OHAs and switching diet to moderate consistent carbs  - continue sliding scale insulin with hypoglycemia protocol    Hypertension  Chronic SAMMY:   - PTA Dyazide was held for most part of hospitalization post op  - blood pressure now elevated in 130s to 150s, will resume PTA Dyazide for discharge      Dyspepsia  GERD: continue Protonix       Chronic, stable medical conditions  Hard of hearing  COPD  Asthma  Irritable bowel syndrome  Meniere disease  Overactive bladder: continue PTA Ditropan    Diet: Diet  Moderate Consistent Carb (60 g CHO per Meal) Diet         DVT Prophylaxis: per primary; PCDs    Code status: full code    Disposition: per orthopedics primary;  medically stable; SW following for disposition to TCU    Clinically Significant Risk Factors                        # DMII: A1C = 6.9 % (Ref range: <5.7 %) within past 6 months   # Obesity: Estimated body mass index is 31.5 kg/m  as calculated from the following:    Height as of this encounter: 1.651 m (5' 5\").    Weight as of this encounter: 85.9 kg (189 lb 4.8 oz).             Page Me (7 am to 6 pm)    Care plan discussed with patient and nursing; also with care coordinator  completed med rec for discharge to TCU              Physical Exam:      Blood pressure 124/56, pulse 88, temperature 98.5  F (36.9  C), temperature source Oral, resp. rate 18, height 1.651 m (5' 5\"), weight 85.9 kg (189 lb 4.8 oz), SpO2 92 %, not currently breastfeeding.  Vitals:    03/21/23 1024   Weight: 85.9 kg (189 lb 4.8 oz)     Vital Signs with Ranges  Temp:  [98.5  F (36.9  C)-99.2  F (37.3  C)] 98.5  F (36.9  C)  Pulse:  [71-88] 88  Resp:  [16-18] 18  BP: (124-155)/(56-74) 124/56  SpO2:  [92 %-97 %] 92 %  I/O's Last 24 hours  I/O last 3 completed shifts:  In: 290 [P.O.:290]  Out: 600 [Urine:600]    Constitutional: Alert, awake and oriented ; very hard " of hearing (able to lip read)       Oral cavity: Moist mucosa   Cardiovascular: Normal s1 s2, regular rate and rhythm, no murmur   Lungs: B/l clear to auscultation, no wheezes or crepitations   Abdomen: Soft, nt, nd, no guarding, rigidity or rebound; BS +   LE : No edema   Musculoskeletal/Neuro  moving all extremities independently ; left arm in postop bandage /splint ; no focal neurological deficits noted   Psychiatry: anxious                Medications:          acetaminophen  975 mg Oral TID     busPIRone  7.5 mg Oral TID     DULoxetine  60 mg Oral QAM     empagliflozin  25 mg Oral QAM     glipiZIDE  10 mg Oral BID AC     insulin aspart  1-10 Units Subcutaneous TID AC     insulin aspart  1-7 Units Subcutaneous At Bedtime     lamoTRIgine  100 mg Oral BID     metFORMIN  500 mg Oral BID w/meals     oxybutynin ER  15 mg Oral QAM     pantoprazole  40 mg Oral QAM AC     polyethylene glycol  17 g Oral Daily     predniSONE  1 mg Oral QAM     predniSONE  5 mg Oral QAM     pregabalin  50 mg Oral BID     senna-docusate  1 tablet Oral BID     sodium chloride (PF)  3 mL Intracatheter Q8H     PRN Meds: acetaminophen, sore throat, bisacodyl, calamine, glucose **OR** dextrose **OR** glucagon, diazepam, diphenhydrAMINE, hydrOXYzine, lidocaine 4%, lidocaine (buffered or not buffered), magnesium hydroxide, methocarbamol, naloxone **OR** naloxone **OR** naloxone **OR** naloxone, ondansetron **OR** ondansetron, oxyCODONE **OR** oxyCODONE, prochlorperazine **OR** prochlorperazine, sodium chloride (PF)         Data:      All new lab and imaging data was reviewed.   Recent Labs   Lab Test 03/23/23  0848 03/22/23  0753 07/03/22  2204 06/16/22  2302 05/26/22  2045 02/14/17  1541 09/06/16  0000   WBC  --   --  16.2* 8.8 9.5   < >  --    HGB 9.0* 9.3* 10.8* 10.2* 10.3*   < >  --    MCV  --   --  81 84 81   < >  --    PLT  --   --  423 326 385   < >  --    INR  --   --   --  1.02 1.01  --  0.9    < > = values in this interval not displayed.       Recent Labs   Lab Test 03/27/23  0557 03/27/23  0144 03/26/23  2101 03/22/23  0918 03/22/23  0753 03/21/23  1710 03/21/23  1025 07/03/22  2204 06/16/22  2302   0000   NA  --   --   --   --  142  --   --  143 144  --    POTASSIUM  --   --   --   --  3.6  --  3.4 2.9* 3.9  --    CHLORIDE  --   --   --   --  103  --   --  98 103  --    CO2  --   --   --   --  32*  --   --  28 27  --    BUN  --   --   --   --  14.6  --   --  26 20  --    CR  --   --   --   --  0.95  --  0.89 0.95 0.90  --    ANIONGAP  --   --   --   --  7  --   --  17 14  --    MACY  --   --   --   --  9.4  --   --  10.8* 10.0  --    * 120* 122*   < > 164*   < > 102* 128* 135*   < >    < > = values in this interval not displayed.     No lab results found.    Invalid input(s): TROP, TROPONINIES

## 2023-03-27 NOTE — PLAN OF CARE
Physical Therapy Discharge Summary    Reason for therapy discharge:    Discharged to transitional care facility.    Progress towards therapy goal(s). See goals on Care Plan in [facility] electronic health record for goal details.  Goals not met.  Barriers to achieving goals:   discharge from facility.    Therapy recommendation(s):    Continued therapy is recommended.  Rationale/Recommendations:  To progress independence and safety with functional mobility.

## 2023-03-28 ENCOUNTER — PATIENT OUTREACH (OUTPATIENT)
Dept: CARE COORDINATION | Facility: CLINIC | Age: 83
End: 2023-03-28
Payer: MEDICARE

## 2023-03-28 PROCEDURE — P9604 ONE-WAY ALLOW PRORATED TRIP: HCPCS | Performed by: NURSE PRACTITIONER

## 2023-03-28 PROCEDURE — 86481 TB AG RESPONSE T-CELL SUSP: CPT | Performed by: NURSE PRACTITIONER

## 2023-03-28 PROCEDURE — 36415 COLL VENOUS BLD VENIPUNCTURE: CPT | Performed by: NURSE PRACTITIONER

## 2023-03-28 NOTE — PROGRESS NOTES
[facility]    Background: Transitional Care Management program identified per system criteria and reviewed by [facility] team for possible outreach.    Assessment: Upon chart review, CCRC Team member will not proceed with patient outreach related to this episode of Transitional Care Management program due to reason below:    Non-[facility] TCU: CCRC team member noted patient discharged to TCU/ARU/LTACH. Patient is not established with a [facility] currently supported by [facility] Primary Care-Care Coordination therefore handoff to Primary Care-Care Coordination is not appropriate at this time.    Plan: Transitional Care Management episode addressed appropriately per reason noted above.      GHISLAINE Vanessa  [facility] Care Resource [place], [facility]    *Connected Care Resource Team does NOT follow patient ongoing. Referrals are identified based on internal discharge reports and the outreach is to ensure patient has an understanding of their discharge instructions.

## 2023-03-28 NOTE — PROGRESS NOTES
[facility] GERIATRICS    PRIMARY CARE PROVIDER AND CLINIC:  Anayna Mclean NP, [place] [facility] [address] / [place]  Chief Complaint   Patient presents with     Hospital F/U      [place] Medical Record Number:  [id number]  Place of Service where encounter took place:  [facility]  ([facility]) [[id number]]    Jumana Yang  is a 82 year old  (1940), admitted to the above facility from  [facility]. Hospital stay 3/21/23 through 3/27/23..   HPI:    PMH significant for HTN, lymphedema, type 2 DM, COPD, asthma, chronic pain with history of severe spinal stenosis (follows with pain clinic), anxiety, depression, bipolar disease, GERD, hard of hearing, meniere disease, overactive bladder, irritable bowel syndrome.    Summary of recent hospitalization:  Patient was admitted at [facility] from 3/20/2023 to 3/27/2023 for elective left open carpal tunnel release and left ring finger A1 pulley release. Hemoglobin dropped secondary to surgery down to 9.0 on 3/23/2023. Hospitalization course uncomplicated. Discharged to TCU for physical rehabilitation and medical management.     Reviewed surgical note, consultant and daily notes from recent hospitalization. There is no discharge summary to review.    Patient seen today for admission visit to the TCU. Patient reads lips due to being hard of hearing. Also communicated through communication board at times.  She is oriented x3.  She reports history of chronic pain and today reports pain 8/10 to left wrist.  She reports no bowel movement for 8 days, does have some stomach pain.  She denies nausea.  She tells me nursing administered a suppository last evening and it had no result.  She denies shortness of breath, chest pain, dizziness/lightheadedness.  She denies dysuria/trouble voiding.  We discussed what to expect in the TCU.    Reviewed facility EMR including medications, recent nursing progress  notes, vital signs.  Discussed patient with nursing staff including plan of care as below.    CODE STATUS/ADVANCE DIRECTIVES DISCUSSION:   CPR/Full code   ALLERGIES:   Allergies   Allergen Reactions     Propofol Nausea and Vomiting     Shellfish Allergy      Other reaction(s): Dizziness     Capsaicin Rash and Blisters      PAST MEDICAL HISTORY:   Past Medical History:   Diagnosis Date     Abdominal pain      Anxiety      Arthritis      Asthma      Bipolar 1 disorder (H)      Chronic pain      Cochlear hydrops 01/01/1988    steriods and diazide     COPD (chronic obstructive pulmonary disease) (H)      Depression      Diabetes mellitus (H)      Dyspepsia      GERD (gastroesophageal reflux disease)      Hard of hearing     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     Hepatic abscess      Hyperlipidemia      Hypertension      Hypothyroidism      Irritable bowel syndrome      Meniere disease      Neuropathy      Noninfectious ileitis      Peritoneal abscess (H)      Spinal stenosis of lumbar region      Steroid long-term use      Vaginitis, atrophic, postmenopausal      Vitamin D deficiency       PAST SURGICAL HISTORY:   has a past surgical history that includes Rectal surgery; Foot surgery; picc insertion (8/28/2013); GI surgery; orthopedic surgery; vascular surgery; Laparoscopic hepatectomy partial (11/4/2013); cataract iol, rt/lt (Left, 7/2013); IR Lumbar Sacral Transfor Injection Bilateral (Bilateral, 3/11/2022); and Release carpal tunnel (Left, 3/21/2023).  FAMILY HISTORY: family history includes Cerebrovascular Disease in her mother; Heart Disease in her brother, father, and mother.  SOCIAL HISTORY:   reports that she quit smoking about 35 years ago. Her smoking use included cigarettes. She has quit using smokeless tobacco. She reports that she does not currently use alcohol. She reports that she does not use drugs.  Patient's living condition: lives alone    Post Discharge Medication Reconciliation Status:   MED  REC REQUIRED  Post Medication Reconciliation Status:  Discharge medications reconciled and changed, see notes/orders         Current Outpatient Medications   Medication Sig     acetaminophen (TYLENOL) 325 MG tablet Take 2 tablets (650 mg) by mouth every 4 hours as needed for other (For optimal non-opioid multimodal pain management to improve pain control.)     busPIRone (BUSPAR) 7.5 MG tablet Take 7.5 mg by mouth 3 times daily     calamine 8-8 % lotion Apply topically every hour as needed for itching     clobetasol (TEMOVATE) 0.05 % external cream Apply topically 2 times daily     cyanocobalamin 1000 MCG SUBL Place 1,000 mcg under the tongue daily     diazepam (VALIUM) 2 MG tablet Take 1 tablet (2 mg) by mouth every 6 hours as needed for anxiety     DULoxetine (CYMBALTA) 60 MG capsule Take 60 mg by mouth every morning     glipiZIDE (GLUCOTROL) 5 MG tablet Take 1 tablet (5 mg) by mouth 2 times daily (before meals)     hydrOXYzine (ATARAX) 25 MG tablet Take 25 mg by mouth 3 times daily as needed for anxiety     JARDIANCE 25 MG TABS tablet Take 25 mg by mouth every morning     lactulose (CEPHULAC) 20 GM packet Take 1 packet (20 g) by mouth daily as needed for constipation     lamoTRIgine (LAMICTAL) 100 MG tablet Take 100 mg by mouth 2 times daily     metFORMIN (GLUCOPHAGE) 500 MG tablet Take 500 mg by mouth 2 times daily (with meals)     methocarbamol (ROBAXIN) 500 MG tablet Take 1 tablet (500 mg) by mouth 3 times daily as needed for muscle spasms     miconazole (MICATIN) 2 % external powder Apply topically 2 times daily as needed To the skin fold     naloxone (NARCAN) 4 MG/0.1ML nasal spray Spray 1 spray (4 mg) into one nostril alternating nostrils as needed for opioid reversal every 2-3 minutes until assistance arrives     omeprazole (PRILOSEC) 20 MG DR capsule Take 20 mg by mouth daily     oxybutynin ER (DITROPAN XL) 15 MG 24 hr tablet Take 15 mg by mouth every morning     oxyCODONE (ROXICODONE) 5 MG tablet Take 1-2  "tablets (5-10 mg) by mouth every 3 hours as needed for moderate pain (4-6) 1 if pain rated 1-5 & 2 if 6-10.     polyethylene glycol (MIRALAX) 17 g packet Take 1 packet by mouth daily     predniSONE (DELTASONE) 1 MG tablet Take 1 mg by mouth every morning (In addition to the 5 mg dose; 1 mg + 5 mg = 6 mg)     predniSONE (DELTASONE) 5 MG tablet Take 5 mg by mouth every morning (In addition to the 1 mg dose; 5 mg + 1 mg = 6 mg)     pregabalin (LYRICA) 50 MG capsule Take 1 capsule (50 mg) by mouth 2 times daily     senna-docusate (SENOKOT-S/PERICOLACE) 8.6-50 MG tablet Take 1 tablet by mouth 2 times daily     triamcinolone (KENALOG) 0.1 % external cream Apply topically 2 times daily as needed     triamterene-HCTZ (DYAZIDE) 37.5-25 MG capsule Take by mouth every morning     vitamin D3 (CHOLECALCIFEROL) 50 mcg (2000 units) tablet Take 3 tablets by mouth daily     blood glucose (ACCU-CHEK FASTCLIX) lancing device FOR TESTING ONCE DAILY. DX  E11.9 TYPE 2 DIABETES     blood glucose (CONTOUR TEST) test strip TESTING EVERY DAY DX  E11.9     CONTOUR NEXT TEST test strip TESTING EVERY DAY DX  E11.9     NONFORMULARY Take 1 tablet by mouth every morning MEDICATION NAME: Buffered Berberine 350 mg (Patient not taking: Reported on 3/29/2023)     order for DME Equipment being ordered: wrist brace (Patient not taking: Reported on 10/25/2022)     No current facility-administered medications for this visit.       ROS:  10 point ROS of systems including Constitutional, Eyes, Respiratory, Cardiovascular, Gastroenterology, Genitourinary, Integumentary, Musculoskeletal, Psychiatric were all negative except for pertinent positives noted in my HPI.    Vitals:  BP (!) 148/61   Pulse 81   Temp 98  F (36.7  C)   Resp 18   Ht 1.753 m (5' 9\")   Wt 85.7 kg (189 lb)   LMP  (LMP Unknown)   SpO2 93%   BMI 27.91 kg/m    Exam:  GENERAL APPEARANCE:  Alert, in NAD  HEENT: normocephalic, moist mucous membranes, nose without drainage or crusting, " [place]  RESP:  respiratory effort normal, no respiratory distress, Lung sounds clear, patient is on RA  CV: auscultation of heart done, rate and rhythm regular  ABDOMEN: + bowel sounds, soft, nontender, no grimacing or guarding with palpation.  M/S: ankle swelling bilaterally   SKIN:  Inspection and palpation of skin and subcutaneous tissue: skin warm, dry without rashes; ACE wrap to left wrist  NEURO: cranial nerves 2-12 grossly intact and at patient's baseline; moves extremities freely  PSYCH: oriented x 3, affect and mood anxious      Lab/Diagnostic data:  Labs done in SNF are in [facility]. Please refer to them using M2G/Care Everywhere. and Recent labs in [facility] reviewed by me today.     ASSESSMENT/PLAN:  S/p Left open carpal tunnel release and left ring finger A1 pulley release on 3/21/2023  -pain at time of visit is 8/10  Plan: Continue current pain regimen with Tylenol 650 mg every 4 hours as needed, methocarbamol 500 mg 3 times daily as needed, oxycodone 5 to 10 mg every 3 hours as needed. Narcan added. No pushing, pulling, lifting, torquing more than 2 pounds with left hand.  Typing writing and light activities with the fingers is okay.  Patient should work on making a full fist and gentle finger range of motion with surgical hand.  Do not use left hand and transferring.  Follow-up with surgeon in 2 weeks.    Acute blood loss anemia  Chronic anemia  -with acute drop secondary to surgery  -baseline hemoglobin 10  -hemoglobin 9.0 on 3/23/2023  Plan: CBC 3/30.    Essential hypertension  Lymphedema  -Blood pressure fairly controlled  -Reports chronic lymphedema, with bilateral ankle swelling today  Plan: Continue triamterene-HCTZ 37.5-25 mg, take 1 tab daily.  TG shaper (or home compression stocking) on in a.m. off in p.m. BMP 3/30. VS per policy. Adjust medications as needed.    Type 2 diabetes  -A1c 6.9% on 3/21/2023  -BS 79, 116  Plan: Reduce glipizide 5 mg (PTA glipizide 10 mg BID) twice daily due to low  BS. Continue Jardiance 25 mg daily, metformin 500 mg twice daily. Carb controlled diet. Increase BS monitoring to TID.    COPD  Asthma  -Respiratory status stable  Plan: Not currently on medical management.  Monitor respiratory status.    Itching  Plan: Discontinue benadryl due to risk for adverse effects. Continue hydroxyzine 25 mg TID PRN and calamine lotion. Can consider adding zyrtec daily PRN if ongoing symptoms.    GERD  Plan: Continue omeprazole 20 mg daily.    Chronic pain with history of severe spinal stenosis   -follows with pain clinic and PTA regimen is methocarbamol 500 mg 3 times daily as needed, Percocet 1 tablet every 6 hours as needed, pregabalin 50 mg twice daily, duloxetine 60 mg daily, prednisone 6 mg daily  Plan: Continue Tylenol 650 mg every 4 hours as needed, duloxetine 60 mg daily, methocarbamol 500 mg 3 times daily as needed, oxycodone 5 to 10 mg every 3 hours as needed, prednisone 6 mg daily, and pregabalin 50 mg twice daily. Monitor pain. Follow up with pain clinic per recommendations.     Anxiety  Depression  Bipolar disease   Chronic, seems anxious today  Plan: Continue BuSpar 7.5 mg 3 times daily, diazepam 2 mg every 6 hours as needed, duloxetine 60 mg daily, hydroxyzine 25 mg 3 times daily as needed, lamotrigine 100 mg twice daily. Monitor mood and symptoms. Consider ACP referral PRN. Noted initial PRN orders for non antipsychotic psychotropic medications are limited to 14 days. Start new psychotropic medication diazepam x 14  days for sporadic anxiety. Nsg to update provider 5 days prior to order expiration.    Overactive bladder  Plan: Continue oxybutynin ER 15 mg daily. Monitor symptoms    Irritable bowel syndrome  Slow transit constipation  -reports no BM x8 days, denies nausea  Plan: Enema STAT. Start lactulose 20 gram daily PRN. Continue senna s 1 tab BID. Change miralax to 17 gram daily. Monitor bowels- nursing to update if enema is not effective.     Physical  deconditioning  Comment: Acute, secondary to surgery, medical conditions as above  Plan: Encourage participation in physical therapy/occupational therapy for strengthening and deconditioning. Discharge planning per their recommendation. Social work to assist with d/c planning.      Disclaimer: This note may contain text created using speech-recognition software and may contain unintended word substitutions.         Electronically signed by:  JUAN ANTONIO Rose CNP

## 2023-03-28 NOTE — PROGRESS NOTES
Care Management Discharge Note    Discharge Date: 03/27/2023       Discharge Disposition: Transitional Care    Discharge Services: Other (see comment) (Therapy)    Discharge DME: None    Discharge Transportation:  [employer] wheelchair transport at 13:45    Private pay costs discussed: Not applicable    PAS Confirmation Code: [id number]  Patient/family educated on Medicare website which has current facility and service quality ratings: no    Education Provided on the Discharge Plan:  yes  Persons Notified of Discharge Plans: patient and son Davie  Patient/Family in Agreement with the Plan: yes    Handoff Referral Completed: No    Additional Information:  Received discharge orders for patient.  Bed available at [facility] for today.  Call placed to update patient's son Davie and he is in agreement to the plan.  He is asking for transport to be arranged.  Patient has [employer] therefore transport should be covered under patient's insurance.  Call placed to [facility] transport to arrange for wheelchair transport at 13:45.  Patient and son informed of the plan and in agreement to the plan.  Call placed to update [facility] and faxed the orders and the PAS.      PAS-RR    D: Per DHS regulation, SW completed and submitted PAS-RR to [place] [employer] on [employer] Direct Connect via the Senior [employer] Line.  PAS-RR confirmation # is : [id number].    I: SW spoke with patient and son Davie and they are aware a PAS-RR has been submitted.  RAJ reviewed with patient and son that they may be contacted for a follow up appointment within 10 days of hospital discharge if their SNF stay is < 30 days.  Contact information for [facility] was also provided.    A: Patient and son verbalized understanding.    P: Further questions may be directed to [facility] at #[phone], option #4 for PAS-RR staff.      OTILIA Belle, [facility]    [phone]  [facility]

## 2023-03-29 ENCOUNTER — LAB REQUISITION (OUTPATIENT)
Dept: LAB | Facility: CLINIC | Age: 83
End: 2023-03-29
Payer: MEDICARE

## 2023-03-29 ENCOUNTER — TRANSITIONAL CARE UNIT VISIT (OUTPATIENT)
Dept: GERIATRICS | Facility: CLINIC | Age: 83
End: 2023-03-29
Payer: MEDICARE

## 2023-03-29 VITALS
RESPIRATION RATE: 18 BRPM | OXYGEN SATURATION: 93 % | WEIGHT: 189 LBS | TEMPERATURE: 98 F | DIASTOLIC BLOOD PRESSURE: 61 MMHG | HEART RATE: 81 BPM | SYSTOLIC BLOOD PRESSURE: 148 MMHG | HEIGHT: 69 IN | BODY MASS INDEX: 27.99 KG/M2

## 2023-03-29 DIAGNOSIS — K59.01 SLOW TRANSIT CONSTIPATION: ICD-10-CM

## 2023-03-29 DIAGNOSIS — F31.9 BIPOLAR 1 DISORDER (H): ICD-10-CM

## 2023-03-29 DIAGNOSIS — I89.0 LYMPHEDEMA: ICD-10-CM

## 2023-03-29 DIAGNOSIS — E11.21 TYPE 2 DIABETES MELLITUS WITH DIABETIC NEPHROPATHY, WITHOUT LONG-TERM CURRENT USE OF INSULIN (H): ICD-10-CM

## 2023-03-29 DIAGNOSIS — L29.9 ITCHING: ICD-10-CM

## 2023-03-29 DIAGNOSIS — K58.9 IRRITABLE BOWEL SYNDROME, UNSPECIFIED TYPE: ICD-10-CM

## 2023-03-29 DIAGNOSIS — I10 ESSENTIAL (PRIMARY) HYPERTENSION: ICD-10-CM

## 2023-03-29 DIAGNOSIS — Z98.890 S/P CARPAL TUNNEL RELEASE: Primary | ICD-10-CM

## 2023-03-29 DIAGNOSIS — M48.00 SPINAL STENOSIS, UNSPECIFIED SPINAL REGION: ICD-10-CM

## 2023-03-29 DIAGNOSIS — F41.9 ANXIETY: ICD-10-CM

## 2023-03-29 DIAGNOSIS — R53.81 PHYSICAL DECONDITIONING: ICD-10-CM

## 2023-03-29 DIAGNOSIS — D64.9 CHRONIC ANEMIA: ICD-10-CM

## 2023-03-29 DIAGNOSIS — K21.9 GASTROESOPHAGEAL REFLUX DISEASE, UNSPECIFIED WHETHER ESOPHAGITIS PRESENT: ICD-10-CM

## 2023-03-29 DIAGNOSIS — I10 ESSENTIAL HYPERTENSION: ICD-10-CM

## 2023-03-29 DIAGNOSIS — D64.9 ANEMIA, UNSPECIFIED: ICD-10-CM

## 2023-03-29 DIAGNOSIS — F32.A DEPRESSION, UNSPECIFIED DEPRESSION TYPE: ICD-10-CM

## 2023-03-29 DIAGNOSIS — G89.29 OTHER CHRONIC PAIN: ICD-10-CM

## 2023-03-29 DIAGNOSIS — D62 ANEMIA DUE TO BLOOD LOSS, ACUTE: ICD-10-CM

## 2023-03-29 DIAGNOSIS — N32.81 OVERACTIVE BLADDER: ICD-10-CM

## 2023-03-29 DIAGNOSIS — J45.20 MILD INTERMITTENT ASTHMA WITHOUT COMPLICATION: ICD-10-CM

## 2023-03-29 DIAGNOSIS — J44.9 CHRONIC OBSTRUCTIVE PULMONARY DISEASE, UNSPECIFIED COPD TYPE (H): ICD-10-CM

## 2023-03-29 PROCEDURE — 99310 SBSQ NF CARE HIGH MDM 45: CPT | Performed by: NURSE PRACTITIONER

## 2023-03-29 RX ORDER — GLIPIZIDE 5 MG/1
5 TABLET ORAL
Start: 2023-03-29 | End: 2024-01-28

## 2023-03-29 NOTE — PATIENT INSTRUCTIONS
Orders  Jumana WINTERS Trey  1940  1) Discontinue lomotil  2) Discontinue benadryl  3) Enema (per house availability) x1 STAT for constipation. Update provider if not effective.  4) CBC, BMP 3/30. Diagnosis: HTN, anemia  5) Follow up with surgeon in 2 weeks.  6) TG shaper (or home compression stocking) on in a.m. off in p.m.   7) Reduce glipizide 5 mg twice daily. Diagnosis: type 2 DM  8) start carb controlled diet  9) Increase BS monitoring to TID.  10) Start lactulose 20 gram daily PRN. Diagnosis: constipation  11) Change miralax to 17 gram daily. Diagnosis: constipation  12) Add 14 day end date to diazepam and update provider 5 days prior to order expiration.  13) Please add to hydroxyzine indication for itching in addition to anxiety  14) Narcan 4 mg / 0.1ml. Spray 1 spray into one nostril alternating nostrils as needed for opioid reversal every 2-3 minutes  JUAN ANTONIO Rose CNP on 3/29/2023 at 2:20 PM

## 2023-03-30 ENCOUNTER — LAB REQUISITION (OUTPATIENT)
Dept: LAB | Facility: CLINIC | Age: 83
End: 2023-03-30
Payer: MEDICARE

## 2023-03-30 ENCOUNTER — APPOINTMENT (OUTPATIENT)
Dept: GENERAL RADIOLOGY | Facility: CLINIC | Age: 83
End: 2023-03-30
Attending: EMERGENCY MEDICINE
Payer: MEDICARE

## 2023-03-30 ENCOUNTER — HOSPITAL ENCOUNTER (OUTPATIENT)
Facility: CLINIC | Age: 83
Setting detail: OBSERVATION
Discharge: NURSING, REHAB OR LONG-TERM CARE | End: 2023-04-01
Attending: EMERGENCY MEDICINE | Admitting: STUDENT IN AN ORGANIZED HEALTH CARE EDUCATION/TRAINING PROGRAM
Payer: MEDICARE

## 2023-03-30 DIAGNOSIS — K58.1 IRRITABLE BOWEL SYNDROME WITH CONSTIPATION: ICD-10-CM

## 2023-03-30 DIAGNOSIS — K59.03 DRUG-INDUCED CONSTIPATION: Primary | ICD-10-CM

## 2023-03-30 DIAGNOSIS — G89.4 CHRONIC PAIN SYNDROME: ICD-10-CM

## 2023-03-30 DIAGNOSIS — K59.01 SLOW TRANSIT CONSTIPATION: ICD-10-CM

## 2023-03-30 DIAGNOSIS — U07.1 COVID-19: ICD-10-CM

## 2023-03-30 DIAGNOSIS — G56.02 CARPAL TUNNEL SYNDROME OF LEFT WRIST: ICD-10-CM

## 2023-03-30 LAB
ANION GAP SERPL CALCULATED.3IONS-SCNC: 11 MMOL/L (ref 7–15)
ANION GAP SERPL CALCULATED.3IONS-SCNC: 9 MMOL/L (ref 7–15)
BASOPHILS # BLD AUTO: 0.1 10E3/UL (ref 0–0.2)
BASOPHILS NFR BLD AUTO: 1 %
BUN SERPL-MCNC: 13.6 MG/DL (ref 8–23)
BUN SERPL-MCNC: 14 MG/DL (ref 8–23)
CALCIUM SERPL-MCNC: 10 MG/DL (ref 8.8–10.2)
CALCIUM SERPL-MCNC: 9.8 MG/DL (ref 8.8–10.2)
CHLORIDE SERPL-SCNC: 100 MMOL/L (ref 98–107)
CHLORIDE SERPL-SCNC: 101 MMOL/L (ref 98–107)
CREAT SERPL-MCNC: 0.93 MG/DL (ref 0.51–0.95)
CREAT SERPL-MCNC: 0.96 MG/DL (ref 0.51–0.95)
DEPRECATED HCO3 PLAS-SCNC: 30 MMOL/L (ref 22–29)
DEPRECATED HCO3 PLAS-SCNC: 31 MMOL/L (ref 22–29)
EOSINOPHIL # BLD AUTO: 0.3 10E3/UL (ref 0–0.7)
EOSINOPHIL NFR BLD AUTO: 3 %
ERYTHROCYTE [DISTWIDTH] IN BLOOD BY AUTOMATED COUNT: 18.6 % (ref 10–15)
ERYTHROCYTE [DISTWIDTH] IN BLOOD BY AUTOMATED COUNT: 18.6 % (ref 10–15)
GAMMA INTERFERON BACKGROUND BLD IA-ACNC: 0 IU/ML
GFR SERPL CREATININE-BSD FRML MDRD: 60 ML/MIN/1.73M2
GFR SERPL CREATININE-BSD FRML MDRD: 62 ML/MIN/1.73M2
GLUCOSE BLDC GLUCOMTR-MCNC: 77 MG/DL (ref 70–99)
GLUCOSE BLDC GLUCOMTR-MCNC: 89 MG/DL (ref 70–99)
GLUCOSE SERPL-MCNC: 116 MG/DL (ref 70–99)
GLUCOSE SERPL-MCNC: 123 MG/DL (ref 70–99)
HCT VFR BLD AUTO: 34.9 % (ref 35–47)
HCT VFR BLD AUTO: 36 % (ref 35–47)
HGB BLD-MCNC: 9.4 G/DL (ref 11.7–15.7)
HGB BLD-MCNC: 9.8 G/DL (ref 11.7–15.7)
IMM GRANULOCYTES # BLD: 0.1 10E3/UL
IMM GRANULOCYTES NFR BLD: 1 %
LYMPHOCYTES # BLD AUTO: 1.5 10E3/UL (ref 0.8–5.3)
LYMPHOCYTES NFR BLD AUTO: 15 %
M TB IFN-G BLD-IMP: NEGATIVE
M TB IFN-G CD4+ BCKGRND COR BLD-ACNC: 10 IU/ML
MCH RBC QN AUTO: 21.7 PG (ref 26.5–33)
MCH RBC QN AUTO: 21.7 PG (ref 26.5–33)
MCHC RBC AUTO-ENTMCNC: 26.9 G/DL (ref 31.5–36.5)
MCHC RBC AUTO-ENTMCNC: 27.2 G/DL (ref 31.5–36.5)
MCV RBC AUTO: 80 FL (ref 78–100)
MCV RBC AUTO: 81 FL (ref 78–100)
MITOGEN IGNF BCKGRD COR BLD-ACNC: 0.01 IU/ML
MITOGEN IGNF BCKGRD COR BLD-ACNC: 0.01 IU/ML
MONOCYTES # BLD AUTO: 0.6 10E3/UL (ref 0–1.3)
MONOCYTES NFR BLD AUTO: 6 %
NEUTROPHILS # BLD AUTO: 7.5 10E3/UL (ref 1.6–8.3)
NEUTROPHILS NFR BLD AUTO: 74 %
NRBC # BLD AUTO: 0 10E3/UL
NRBC BLD AUTO-RTO: 0 /100
PLATELET # BLD AUTO: 348 10E3/UL (ref 150–450)
PLATELET # BLD AUTO: 360 10E3/UL (ref 150–450)
POTASSIUM SERPL-SCNC: 3.6 MMOL/L (ref 3.4–5.3)
POTASSIUM SERPL-SCNC: 3.8 MMOL/L (ref 3.4–5.3)
QUANTIFERON MITOGEN: 10 IU/ML
QUANTIFERON NIL TUBE: 0 IU/ML
QUANTIFERON TB1 TUBE: 0.01 IU/ML
QUANTIFERON TB2 TUBE: 0.01
RBC # BLD AUTO: 4.33 10E6/UL (ref 3.8–5.2)
RBC # BLD AUTO: 4.52 10E6/UL (ref 3.8–5.2)
SODIUM SERPL-SCNC: 141 MMOL/L (ref 136–145)
SODIUM SERPL-SCNC: 141 MMOL/L (ref 136–145)
WBC # BLD AUTO: 10 10E3/UL (ref 4–11)
WBC # BLD AUTO: 8.7 10E3/UL (ref 4–11)

## 2023-03-30 PROCEDURE — 85027 COMPLETE CBC AUTOMATED: CPT | Performed by: EMERGENCY MEDICINE

## 2023-03-30 PROCEDURE — 99285 EMERGENCY DEPT VISIT HI MDM: CPT

## 2023-03-30 PROCEDURE — G0378 HOSPITAL OBSERVATION PER HR: HCPCS

## 2023-03-30 PROCEDURE — 96361 HYDRATE IV INFUSION ADD-ON: CPT

## 2023-03-30 PROCEDURE — 80048 BASIC METABOLIC PNL TOTAL CA: CPT | Performed by: NURSE PRACTITIONER

## 2023-03-30 PROCEDURE — 74019 RADEX ABDOMEN 2 VIEWS: CPT

## 2023-03-30 PROCEDURE — 85027 COMPLETE CBC AUTOMATED: CPT | Performed by: NURSE PRACTITIONER

## 2023-03-30 PROCEDURE — 250N000013 HC RX MED GY IP 250 OP 250 PS 637: Performed by: PHYSICIAN ASSISTANT

## 2023-03-30 PROCEDURE — P9604 ONE-WAY ALLOW PRORATED TRIP: HCPCS | Performed by: NURSE PRACTITIONER

## 2023-03-30 PROCEDURE — 82962 GLUCOSE BLOOD TEST: CPT

## 2023-03-30 PROCEDURE — 250N000013 HC RX MED GY IP 250 OP 250 PS 637: Performed by: EMERGENCY MEDICINE

## 2023-03-30 PROCEDURE — 258N000003 HC RX IP 258 OP 636: Performed by: EMERGENCY MEDICINE

## 2023-03-30 PROCEDURE — 99223 1ST HOSP IP/OBS HIGH 75: CPT | Mod: AI | Performed by: PHYSICIAN ASSISTANT

## 2023-03-30 PROCEDURE — 36415 COLL VENOUS BLD VENIPUNCTURE: CPT | Performed by: EMERGENCY MEDICINE

## 2023-03-30 PROCEDURE — 96360 HYDRATION IV INFUSION INIT: CPT

## 2023-03-30 PROCEDURE — 36415 COLL VENOUS BLD VENIPUNCTURE: CPT | Performed by: NURSE PRACTITIONER

## 2023-03-30 PROCEDURE — 80048 BASIC METABOLIC PNL TOTAL CA: CPT | Performed by: EMERGENCY MEDICINE

## 2023-03-30 RX ORDER — LACTULOSE 10 G/15ML
10 SOLUTION ORAL EVERY 8 HOURS
Status: DISCONTINUED | OUTPATIENT
Start: 2023-03-31 | End: 2023-04-01 | Stop reason: HOSPADM

## 2023-03-30 RX ORDER — PREGABALIN 25 MG/1
50 CAPSULE ORAL 2 TIMES DAILY
Status: DISCONTINUED | OUTPATIENT
Start: 2023-03-30 | End: 2023-04-01 | Stop reason: HOSPADM

## 2023-03-30 RX ORDER — MAGNESIUM CARB/ALUMINUM HYDROX 105-160MG
30 TABLET,CHEWABLE ORAL ONCE
Status: DISCONTINUED | OUTPATIENT
Start: 2023-03-30 | End: 2023-03-30

## 2023-03-30 RX ORDER — OXYCODONE HYDROCHLORIDE 5 MG/1
5-10 TABLET ORAL
Status: DISCONTINUED | OUTPATIENT
Start: 2023-03-30 | End: 2023-04-01 | Stop reason: HOSPADM

## 2023-03-30 RX ORDER — NALOXONE HYDROCHLORIDE 0.4 MG/ML
0.2 INJECTION, SOLUTION INTRAMUSCULAR; INTRAVENOUS; SUBCUTANEOUS
Status: DISCONTINUED | OUTPATIENT
Start: 2023-03-30 | End: 2023-04-01 | Stop reason: HOSPADM

## 2023-03-30 RX ORDER — NICOTINE POLACRILEX 4 MG
15-30 LOZENGE BUCCAL
Status: DISCONTINUED | OUTPATIENT
Start: 2023-03-30 | End: 2023-04-01 | Stop reason: HOSPADM

## 2023-03-30 RX ORDER — ONDANSETRON 4 MG/1
4 TABLET, ORALLY DISINTEGRATING ORAL EVERY 6 HOURS PRN
Status: DISCONTINUED | OUTPATIENT
Start: 2023-03-30 | End: 2023-04-01 | Stop reason: HOSPADM

## 2023-03-30 RX ORDER — DEXTROSE MONOHYDRATE 25 G/50ML
25-50 INJECTION, SOLUTION INTRAVENOUS
Status: DISCONTINUED | OUTPATIENT
Start: 2023-03-30 | End: 2023-04-01 | Stop reason: HOSPADM

## 2023-03-30 RX ORDER — NALOXONE HYDROCHLORIDE 0.4 MG/ML
0.4 INJECTION, SOLUTION INTRAMUSCULAR; INTRAVENOUS; SUBCUTANEOUS
Status: DISCONTINUED | OUTPATIENT
Start: 2023-03-30 | End: 2023-04-01 | Stop reason: HOSPADM

## 2023-03-30 RX ORDER — AMOXICILLIN 250 MG
2 CAPSULE ORAL 2 TIMES DAILY
Status: DISCONTINUED | OUTPATIENT
Start: 2023-03-30 | End: 2023-04-01 | Stop reason: HOSPADM

## 2023-03-30 RX ORDER — METHOCARBAMOL 500 MG/1
500 TABLET, FILM COATED ORAL 3 TIMES DAILY PRN
Status: DISCONTINUED | OUTPATIENT
Start: 2023-03-30 | End: 2023-04-01 | Stop reason: HOSPADM

## 2023-03-30 RX ORDER — AMOXICILLIN 250 MG
1 CAPSULE ORAL 2 TIMES DAILY
Status: DISCONTINUED | OUTPATIENT
Start: 2023-03-30 | End: 2023-04-01 | Stop reason: HOSPADM

## 2023-03-30 RX ORDER — BISACODYL 5 MG
10 TABLET, DELAYED RELEASE (ENTERIC COATED) ORAL ONCE
Status: COMPLETED | OUTPATIENT
Start: 2023-03-30 | End: 2023-03-30

## 2023-03-30 RX ORDER — DIAZEPAM 2 MG
2 TABLET ORAL EVERY 6 HOURS PRN
Status: DISCONTINUED | OUTPATIENT
Start: 2023-03-30 | End: 2023-04-01 | Stop reason: HOSPADM

## 2023-03-30 RX ORDER — POLYETHYLENE GLYCOL 3350 17 G/17G
238 POWDER, FOR SOLUTION ORAL ONCE
Status: COMPLETED | OUTPATIENT
Start: 2023-03-30 | End: 2023-03-30

## 2023-03-30 RX ORDER — LORAZEPAM 1 MG/1
2 TABLET ORAL ONCE
Status: COMPLETED | OUTPATIENT
Start: 2023-03-30 | End: 2023-03-30

## 2023-03-30 RX ORDER — ONDANSETRON 2 MG/ML
4 INJECTION INTRAMUSCULAR; INTRAVENOUS EVERY 6 HOURS PRN
Status: DISCONTINUED | OUTPATIENT
Start: 2023-03-30 | End: 2023-04-01 | Stop reason: HOSPADM

## 2023-03-30 RX ORDER — LAMOTRIGINE 100 MG/1
100 TABLET ORAL 2 TIMES DAILY
Status: DISCONTINUED | OUTPATIENT
Start: 2023-03-30 | End: 2023-04-01 | Stop reason: HOSPADM

## 2023-03-30 RX ADMIN — SODIUM CHLORIDE 1000 ML: 9 INJECTION, SOLUTION INTRAVENOUS at 10:53

## 2023-03-30 RX ADMIN — DOCUSATE SODIUM 226 ML: 50 LIQUID ORAL at 13:33

## 2023-03-30 RX ADMIN — LORAZEPAM 2 MG: 1 TABLET ORAL at 12:18

## 2023-03-30 RX ADMIN — SENNOSIDES AND DOCUSATE SODIUM 2 TABLET: 50; 8.6 TABLET ORAL at 21:35

## 2023-03-30 RX ADMIN — BISACODYL 10 MG: 5 TABLET, COATED ORAL at 13:41

## 2023-03-30 RX ADMIN — POLYETHYLENE GLYCOL 3350 238 G: 17 POWDER, FOR SOLUTION ORAL at 13:46

## 2023-03-30 ASSESSMENT — ACTIVITIES OF DAILY LIVING (ADL)
ADLS_ACUITY_SCORE: 35
ADLS_ACUITY_SCORE: 35
ADLS_ACUITY_SCORE: 41
ADLS_ACUITY_SCORE: 35
ADLS_ACUITY_SCORE: 41
ADLS_ACUITY_SCORE: 41
ADLS_ACUITY_SCORE: 35

## 2023-03-30 ASSESSMENT — ENCOUNTER SYMPTOMS: CONSTIPATION: 1

## 2023-03-30 NOTE — ED NOTES
[facility]  ED Nurse Handoff Report    Jumana Yang is a 82 year old female   ED Chief complaint: Constipation  . ED Diagnosis:   Final diagnoses:   None     Allergies:   Allergies   Allergen Reactions     Propofol Nausea and Vomiting     Shellfish Allergy      Other reaction(s): Dizziness     Capsaicin Rash and Blisters       Code Status: Full Code  Activity level - Baseline/Home:  Assist X 1. Activity Level - Current:   Assist X 1. Lift room needed: No. Bariatric: No   Needed: No   Isolation: No. Infection: Not Applicable.     Vital Signs:   Vitals:    03/30/23 1230 03/30/23 1300 03/30/23 1330 03/30/23 1500   BP: (!) 144/93 125/73 109/46 116/60   Pulse: 104 85 86 81   Resp:       Temp:       TempSrc:       SpO2: 93%  96% 90%       Cardiac Rhythm:  ,      Pain level:    Patient confused: Yes. Patient Falls Risk: Yes.   Elimination Status: Has voided   Patient Report - Initial Complaint: constipation. Focused Assessment:  Pt from TCU [facility]. Had Carpal Tunnel surgery on 3/21, has had no BM since then. Has had enema X2, stool softeners, prune juice, etc with no results. Is passing flatus. Denies Abd pain or discomfort, abdomen appears somewhat distended.  Pt is VERY [place] and relies on lip reading.   Tests Performed: labs, imaging Abnormal Results:   Labs Ordered and Resulted from Time of ED Arrival to Time of ED Departure   BASIC METABOLIC PANEL - Abnormal       Result Value    Sodium 141      Potassium 3.8      Chloride 100      Carbon Dioxide (CO2) 30 (*)     Anion Gap 11      Urea Nitrogen 13.6      Creatinine 0.96 (*)     Calcium 10.0      Glucose 123 (*)     GFR Estimate 60 (*)    CBC WITH PLATELETS AND DIFFERENTIAL - Abnormal    WBC Count 10.0      RBC Count 4.52      Hemoglobin 9.8 (*)     Hematocrit 36.0      MCV 80      MCH 21.7 (*)     MCHC 27.2 (*)     RDW 18.6 (*)     Platelet Count 360      % Neutrophils 74      % Lymphocytes 15      % Monocytes 6      % Eosinophils 3       % Basophils 1      % Immature Granulocytes 1      NRBCs per 100 WBC 0      Absolute Neutrophils 7.5      Absolute Lymphocytes 1.5      Absolute Monocytes 0.6      Absolute Eosinophils 0.3      Absolute Basophils 0.1      Absolute Immature Granulocytes 0.1      Absolute NRBCs 0.0       Abdomen XR, 2 vw, flat and upright   Final Result   IMPRESSION: Large stool throughout the colon. No evidence for small   bowel obstruction. No free air at decubitus imaging.           MICH CHRISTIANSON MD            SYSTEM ID:  [id number]        Treatments provided: See MAR  Family Comments: family at bedside  OBS brochure/video discussed/provided to patient:  Yes  ED Medications:   Medications   0.9% sodium chloride BOLUS (1,000 mLs Intravenous $New Bag 3/30/23 1053)   LORazepam (ATIVAN) tablet 2 mg (2 mg Oral $Given 3/30/23 1218)   Enema Compound (docusate/mineral oil/NaPhos) NO MAG CIT PREMIX (226 mLs Rectal $Given 3/30/23 1333)   polyethylene glycol (MIRALAX) powder 238 g (238 g Oral $Given 3/30/23 1346)   bisacodyl (DULCOLAX) EC tablet 10 mg (10 mg Oral $Given 3/30/23 1341)     Drips infusing:  No  For the majority of the shift, the patient's behavior Green. Interventions performed were N/A.    Sepsis treatment initiated: No     Patient tested for COVID 19 prior to admission: NO    ED Nurse Name/Phone Number: Rosalinda Sloan RN,   3:47 PM    RECEIVING UNIT ED HANDOFF REVIEW    Above ED Nurse Handoff Report was reviewed: Yes  Reviewed by: Anny Groves RN on March 30, 2023 at 5:02 PM

## 2023-03-30 NOTE — H&P
[facility]    Hospitalist History and Physical    Name: Jumana Yang    MRN: [id number]  YOB: 1940    Age: 82 year old  Date of Admission:  3/30/2023  Date of Service (when I saw the patient): 03/30/23    Assessment & Plan   Jumana Yang is a 82 year old female who is extremely hard of hearing (able to read lips) with PMH significant for chronic pain, h/o spinal stenosis (follows with a pain clinic), anxiety, depression, bipolar disorder, DM2, hypertension, COPD, asthma, IBS and who was recenlty admitted from 3/21-3/27/2023 at [facility] for left open carpal tunnel release (3/21/2023) and ultimately discharged to TCU who presents to the ED from 3/30/2023 from [facility] TCU with concern for constipation.     ED workup reveals: VSS, CO2 of 30 (appears chronic based on previous labs), creatinine of 0.96, glucose of 123, hemoglobin of 9.8, and abdominal x-ray shows large stool throughout the colon, no evidence for small bowel obstruction, no free air at decubitus imaging.    #Slow transit constipation  #IBS: reported no BM for 9 days without associated nausea, vomiting, or abdominal pain. Patient received enema, lactulose, miralax, and senna BID at TCU on 2/29 without results. Abdominal x-ray in ED shows large stool throughout but without evidence of obstruction. Manual disimpaction attempted in ED without success. Suspect related to lack of mobility while admitted and on increased doses of narcotics postoperatively.   -received bisacodyl 10 mg, enema, and started on Miralax prep in ED   -complete Miralax prep tonight  -start Lactulose TID in AM if still no output, hold if having BM or diarrhea   -scheduled Senna BID  -CLD until adequate stool output  -encourage mobility to stimulate bowels   -monitor I&Os    #Altered mental status: patient intermittently falling asleep while interviewing in the ED, suspect due to receiving Ativan prior to manual disimpaction. She is able to answer  "questions appropriately but intermittently forgetful. Suspected compounded by being hard of hearing.  -reassess improvement and at cognitive baseline in AM    #S/p left open carpel tunnel release (3/21/2023)  -surgery by Dr. Hamilton at [facility] without complication  -no pushing, pulling, lifting, torquing more than 2 pounds with left hand.    -pain team consulted during recent stay due to chronic pain syndrome  -keep scheduled f/u with surgeon in 2 weeks     #Acute on chronic anemia: acute anemia suspected due to blood loss from recent surgery.  -Hgb stable in 9 range since surgery, currently 9.8 (baseline around 10)    #Chronic pain  #Arthritis  #Severe spinal stenosis s/p surgical intervention  #Peripheral neuropathy: follows with pain clinic, regimen recently adjusted due to recent surgery by pain team at [facility].   -continue PTA Tylenol, Lyrica, Prednisone, and PRN Robaxin  -currently on Oxycodone 5-10 mg every 3 hours PRN (PTA taking Percocet 5-325 mg every 6 hours PRN)     #Bipolar 1 disorder  #Depression  #Anxiety: resume PTA Buspar, Valium, Cymbalta, Atarax, and Lamictal     #DM2: most recent HgbA1c of 6.9, initial blood glucose of 123  -continue PTA Glipizide, Metformin, and Jardiance  -medium sliding scale insulin    #HTN  #Chronic LE edema: BP stable  -continue PTA Dyazide with parameters    #GERD: resume PTA PPI    #COPD, chronic hypercapnia   #Asthma: CO2 chronically elevated on labs, no current exacerbation     #Overactive bladder: resume PTA oxybutynin     Clinically Significant Risk Factors Present on Admission                      # DMII: A1C = 6.9 % (Ref range: <5.7 %) within past 6 months    # Overweight: Estimated body mass index is 27.91 kg/m  as calculated from the following:    Height as of 3/29/23: 1.753 m (5' 9\").    Weight as of 3/29/23: 85.7 kg (189 lb).         DVT Prophylaxis: Pneumatic Compression Devices  Code Status: Full Code, per previous hospital stay, difficult to discuss " with patient on admission due to be lethargic, reassess in AM  Disposition: Expected discharge in 24-48 hours once patient has adequate stool output, will admit to observation    Primary Care Physician   Ananya Mclean    Chief Complaint   Constipation    History obtained from discussion with ED provider, Dr. Hodges, chart review, and interview with patient which is limited at times due to the patient being lethargic after receiving Ativan while in the ED.     History of Present Illness   Jumana Yang is a 82 year old female who presents from TCU due to concern for no bowel movement for 9 days.  The patient was recently admitted at [facility] from 3/21 to 3/27 for left carpal tunnel release.  The patient is chronically on opioids and these were adjusted during her stay for pain control.  The patient was on a bowel regimen while admitted but without any output prior to discharge.  While at rehab on 3/29 she was seen by the provider and received an enema, a dose of lactulose, miralax, and senna BID without results.  In the ED the patient reports that she is still able to pass gas (last time this morning) but denies nausea, vomiting, or abdominal pain.  She has been eating and drinking per usual of recent and has been attempting to increase her water intake.  The patient has a history of IBS and has had issues previously with constipation.  The patient is drowsy throughout the interview and falls in and out of sleep. She asks if she will need a procedure to have a bowel movement and why they could not remove any in the ED. Denies any chest pain, shortness of breath, or numbness/tingling in her left upper extremity.      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     Hepatic abscess      Hyperlipidemia      Hypertension      Hypothyroidism      Irritable bowel syndrome      Meniere disease      Neuropathy      Noninfectious ileitis      Peritoneal abscess (H)      Spinal stenosis of lumbar region      Steroid long-term use      Vaginitis, atrophic, postmenopausal      Vitamin D deficiency      Past Surgical History   Past Surgical History:   Procedure Laterality Date     CATARACT IOL, RT/LT Left 7/2013     FOOT SURGERY      toe     GI SURGERY       IR LUMBAR/SACRAL TRANSFOR INJ BILATERAL Bilateral 3/11/2022     LAPAROSCOPIC HEPATECTOMY PARTIAL  11/4/2013    Procedure: LAPAROSCOPIC HEPATECTOMY PARTIAL;  Laparoscopic Debridement of Liver Abcess;  Surgeon: Sepideh Green MD;  Location: [facility] OR     ORTHOPEDIC SURGERY       PICC INSERTION  8/28/2013    5fr DL Power PICC, 41cm (1cm external length) in the R lateral brachial vein with tip in the SVC RA junction.     RECTAL SURGERY      1970s     RELEASE CARPAL TUNNEL Left 3/21/2023    Procedure: LEFT OPEN CARPAL TUNNEL RELEASE. LEFT RING FINGER A1 PULLEY RELEASE;  Surgeon: Claire Hamilton MD;  Location: [facility] OR     VASCULAR SURGERY       Prior to Admission Medications   Prior to Admission Medications   Prescriptions Last Dose Informant Patient Reported? Taking?   CONTOUR NEXT TEST test strip  Care Giver Yes No   Sig: TESTING EVERY DAY DX  E11.9   DULoxetine (CYMBALTA) 60 MG capsule  Care Giver Yes No   Sig: Take 60 mg by mouth every morning   JARDIANCE 25 MG TABS tablet  Care Giver Yes No   Sig: Take 25 mg by mouth every morning   NONFORMULARY  Care Giver Yes No   Sig: Take 1 tablet by mouth every morning MEDICATION NAME: Buffered Berberine 350 mg   Patient not taking: Reported on 3/29/2023   acetaminophen (TYLENOL) 325 MG tablet   No No   Sig: Take 2  tablets (650 mg) by mouth every 4 hours as needed for other (For optimal non-opioid multimodal pain management to improve pain control.)   blood glucose (ACCU-CHEK FASTCLIX) lancing device  Care Giver Yes No   Sig: FOR TESTING ONCE DAILY. DX  E11.9 TYPE 2 DIABETES   blood glucose (CONTOUR TEST) test strip  Care Giver Yes No   Sig: TESTING EVERY DAY DX  E11.9   busPIRone (BUSPAR) 7.5 MG tablet  Care Giver Yes No   Sig: Take 7.5 mg by mouth 3 times daily   calamine 8-8 % lotion  Care Giver No No   Sig: Apply topically every hour as needed for itching   clobetasol (TEMOVATE) 0.05 % external cream  Care Giver Yes No   Sig: Apply topically 2 times daily   cyanocobalamin 1000 MCG SUBL  Care Giver Yes No   Sig: Place 1,000 mcg under the tongue daily   diazepam (VALIUM) 2 MG tablet   No No   Sig: Take 1 tablet (2 mg) by mouth every 6 hours as needed for anxiety   glipiZIDE (GLUCOTROL) 5 MG tablet   No No   Sig: Take 1 tablet (5 mg) by mouth 2 times daily (before meals)   hydrOXYzine (ATARAX) 25 MG tablet  Care Giver Yes No   Sig: Take 25 mg by mouth 3 times daily as needed for anxiety   lactulose (CEPHULAC) 20 GM packet   No No   Sig: Take 1 packet (20 g) by mouth daily as needed for constipation   lamoTRIgine (LAMICTAL) 100 MG tablet  Care Giver Yes No   Sig: Take 100 mg by mouth 2 times daily   metFORMIN (GLUCOPHAGE) 500 MG tablet  Care Giver Yes No   Sig: Take 500 mg by mouth 2 times daily (with meals)   methocarbamol (ROBAXIN) 500 MG tablet  Care Giver No No   Sig: Take 1 tablet (500 mg) by mouth 3 times daily as needed for muscle spasms   miconazole (MICATIN) 2 % external powder  Care Giver Yes No   Sig: Apply topically 2 times daily as needed To the skin fold   naloxone (NARCAN) 4 MG/0.1ML nasal spray   No No   Sig: Spray 1 spray (4 mg) into one nostril alternating nostrils as needed for opioid reversal every 2-3 minutes until assistance arrives   omeprazole (PRILOSEC) 20 MG DR capsule  Care Giver Yes No   Sig: Take  20 mg by mouth daily   order for DME  Care Giver No No   Sig: Equipment being ordered: wrist brace   Patient not taking: Reported on 10/25/2022   oxyCODONE (ROXICODONE) 5 MG tablet   No No   Sig: Take 1-2 tablets (5-10 mg) by mouth every 3 hours as needed for moderate pain (4-6) 1 if pain rated 1-5 & 2 if 6-10.   oxybutynin ER (DITROPAN XL) 15 MG 24 hr tablet  Care Giver Yes No   Sig: Take 15 mg by mouth every morning   polyethylene glycol (MIRALAX) 17 g packet  Care Giver Yes No   Sig: Take 1 packet by mouth daily   predniSONE (DELTASONE) 1 MG tablet  Care Giver Yes No   Sig: Take 1 mg by mouth every morning (In addition to the 5 mg dose; 1 mg + 5 mg = 6 mg)   predniSONE (DELTASONE) 5 MG tablet  Care Giver Yes No   Sig: Take 5 mg by mouth every morning (In addition to the 1 mg dose; 5 mg + 1 mg = 6 mg)   pregabalin (LYRICA) 50 MG capsule   No No   Sig: Take 1 capsule (50 mg) by mouth 2 times daily   senna-docusate (SENOKOT-S/PERICOLACE) 8.6-50 MG tablet  Care Giver No No   Sig: Take 1 tablet by mouth 2 times daily   triamcinolone (KENALOG) 0.1 % external cream  Care Giver Yes No   Sig: Apply topically 2 times daily as needed   triamterene-HCTZ (DYAZIDE) 37.5-25 MG capsule  Care Giver Yes No   Sig: Take by mouth every morning   vitamin D3 (CHOLECALCIFEROL) 50 mcg (2000 units) tablet  Care Giver Yes No   Sig: Take 3 tablets by mouth daily      Facility-Administered Medications: None     Allergies   Allergies   Allergen Reactions     Propofol Nausea and Vomiting     Shellfish Allergy      Other reaction(s): Dizziness     Capsaicin Rash and Blisters     Social History   Social History     Tobacco Use     Smoking status: Former     Types: Cigarettes     Quit date: 11/15/1987     Years since quittin.3     Smokeless tobacco: Former   Substance Use Topics     Alcohol use: Not Currently     Alcohol/week: 0.0 standard drinks     Comment: MALISSA white since      Social History     Social History Narrative    **  Merged History Encounter **          Family History   Unable to review with patient due to mental status.     Review of Systems   Unable to fully complete due to mental status.     Physical Exam   Temp: 96.9  F (36.1  C) Temp src: Temporal BP: 116/60 Pulse: 81   Resp: 18 SpO2: 90 % O2 Device: Nasal cannula    Vital Signs with Ranges  Temp:  [96.9  F (36.1  C)] 96.9  F (36.1  C)  Pulse:  [] 81  Resp:  [18] 18  BP: (109-144)/(46-93) 116/60  FiO2 (%):  [2 %] 2 %  SpO2:  [90 %-96 %] 90 %  0 lbs 0 oz    GEN: somnolent and intermittently alert, able to answer most questions appropriately, appears comfortable, no overt distress  HEENT:  Normocephalic/atraumatic, no scleral icterus, no nasal discharge, mouth moist.  CV:  Regular rate and rhythm, no murmur or JVD.  S1 + S2 noted, no S3 or S4.  LUNGS:  Clear to auscultation bilaterally without rales/rhonchi/wheezing/retractions.  Symmetric chest rise on inhalation noted.  ABD: normoactive bowel sounds, full but not distended, non-tender, soft. No rebound/guarding/rigidity.  EXT: bilateral LE lymphedema. No cyanosis.  No acute joint synovitis noted. Left UE in ACE wrap, able to wiggle fingers without difficulty, 2+ bilateral radial pulses.   SKIN:  Dry to touch, no exanthems noted in the visualized areas.  NEURO: difficult to complete full assessment due to somnolence and patient being hard of hearing, no focal deficits appreciated on limited exam     Data   Data reviewed today:  I personally reviewed all labs and imaging from this visit.     Results for orders placed or performed during the hospital encounter of 03/30/23   Abdomen XR, 2 vw, flat and upright     Status: None    Narrative    ABDOMEN TWO VIEWS   3/30/2023 11:51 AM     HISTORY: Rule out obstruction, pain.    COMPARISON: CT abdomen and pelvis 4/22/2022.      Impression    IMPRESSION: Large stool throughout the colon. No evidence for small  bowel obstruction. No free air at decubitus imaging.        MICH  MD SAMMY         SYSTEM ID:  [id number]   Basic metabolic panel     Status: Abnormal   Result Value Ref Range    Sodium 141 136 - 145 mmol/L    Potassium 3.8 3.4 - 5.3 mmol/L    Chloride 100 98 - 107 mmol/L    Carbon Dioxide (CO2) 30 (H) 22 - 29 mmol/L    Anion Gap 11 7 - 15 mmol/L    Urea Nitrogen 13.6 8.0 - 23.0 mg/dL    Creatinine 0.96 (H) 0.51 - 0.95 mg/dL    Calcium 10.0 8.8 - 10.2 mg/dL    Glucose 123 (H) 70 - 99 mg/dL    GFR Estimate 60 (L) >60 mL/min/1.73m2   CBC with platelets and differential     Status: Abnormal   Result Value Ref Range    WBC Count 10.0 4.0 - 11.0 10e3/uL    RBC Count 4.52 3.80 - 5.20 10e6/uL    Hemoglobin 9.8 (L) 11.7 - 15.7 g/dL    Hematocrit 36.0 35.0 - 47.0 %    MCV 80 78 - 100 fL    MCH 21.7 (L) 26.5 - 33.0 pg    MCHC 27.2 (L) 31.5 - 36.5 g/dL    RDW 18.6 (H) 10.0 - 15.0 %    Platelet Count 360 150 - 450 10e3/uL    % Neutrophils 74 %    % Lymphocytes 15 %    % Monocytes 6 %    % Eosinophils 3 %    % Basophils 1 %    % Immature Granulocytes 1 %    NRBCs per 100 WBC 0 <1 /100    Absolute Neutrophils 7.5 1.6 - 8.3 10e3/uL    Absolute Lymphocytes 1.5 0.8 - 5.3 10e3/uL    Absolute Monocytes 0.6 0.0 - 1.3 10e3/uL    Absolute Eosinophils 0.3 0.0 - 0.7 10e3/uL    Absolute Basophils 0.1 0.0 - 0.2 10e3/uL    Absolute Immature Granulocytes 0.1 <=0.4 10e3/uL    Absolute NRBCs 0.0 10e3/uL   CBC with platelets differential     Status: Abnormal    Narrative    The following orders were created for panel order CBC with platelets differential.  Procedure                               Abnormality         Status                     ---------                               -----------         ------                     CBC with platelets and d...[[id number]]  Abnormal            Final result                 Please view results for these tests on the individual orders.     MAHESH [facility]  Securely message with the [employer] Web Console (learn more here)  Text page via  [employer] Paging/Directory

## 2023-03-30 NOTE — ED TRIAGE NOTES
Pt from TCU [facility]. Had Carpal Tunnel surgery on 3/21, has had no BM since then. Has had enema X2, stool softeners, prune juice, etc with no results. Is passing flatus. Denies Abd pain or discomfort, abdomen appears somewhat distended.  Pt is VERY [place] and relies on lip reading.      Triage Assessment     Row Name 03/30/23 0923       Triage Assessment (Adult)    Airway WDL WDL       Respiratory WDL    Respiratory WDL WDL       Skin Circulation/Temperature WDL    Skin Circulation/Temperature WDL WDL       Cardiac WDL    Cardiac WDL WDL       Peripheral/Neurovascular WDL    Peripheral Neurovascular WDL WDL       Cognitive/Neuro/Behavioral WDL    Cognitive/Neuro/Behavioral WDL WDL

## 2023-03-30 NOTE — PLAN OF CARE
PRIMARY DIAGNOSIS: Constipation  OUTPATIENT/OBSERVATION GOALS TO BE MET BEFORE DISCHARGE:  1. ADLs back to baseline: Yes    2. Activity and level of assistance: Up with maximum assistance.     3. Pain status: Improved-controlled with oral pain medications.    4. Return to near baseline physical activity: Yes     Discharge Planner Nurse   Safe discharge environment identified: No  Barriers to discharge: Yes       Entered by: Anny Groves RN 03/30/2023 6:23 PM   Pt A&Ox4. Intermittently forgetful VSS, on 2L NC. Severely hard of hearing. Can hear best out of left ear and with lip reading. Reporting back pain. No pain in abdomen. Abdomen is soft and non-tender. Bowel sounds hypoactive. Manual dis-impaction in ED was unsuccessful. Plan to complete miralax prep overnight and start lactulose TID in AM. Denies nausea, SOB, and chest pain. Ax1-2. Stated that she can stand and pivot. Would be a candidate for andrey steady. Is unable to push, pull, or lift, with left hand. Nonblanchable redness to coccyx, no open areas. Reposition Q2hr. On Clear liquid diet. CM/SW following. Could consider and PT consult.   Please review provider order for any additional goals.   Nurse to notify provider when observation goals have been met and patient is ready for discharge.

## 2023-03-30 NOTE — ED PROVIDER NOTES
History     Chief Complaint:  Constipation       HPI   Jumana Yang is a 82 year old female with a history of IBS, slow transit constipation who presents with constipation; she has not had a bowel movement for the past nine days. She was admitted from 3/21 to 3/27 for carpal tunnel and while in the hospital she received various opioid medications for her pain. Prior to her admission, she was already taking 5 tabs of Percocet daily for her pain. She had two enemas yesterday as well as a large amount of Miralax without any bowel movement. She has been passing gas.     Independent Historian: patient      Review of External Notes:  I reviewed TCU note from yesterday regarding patient's bowel regimen.  She had not had stool in 8 days at that point.  They performed 2 enemas, and gave her lactulose, senna and increased MiraLAX without success.    ROS:  Review of Systems   Gastrointestinal: Positive for constipation.   All other systems reviewed and are negative.      Allergies:  Propofol  Shellfish Allergy  Capsaicin     Medications:    Diazepam   Duloxetine   Glipizide  Insulin   Hydroxyzine   Jardiance   Lamotrigine   Metformin   Methocarbamol  Naloxone   Omeprazole   Oxybutynin   Prednisone   Pregabalin   Senna-docusate  Atorvastatin     Past Medical History:    Anxiety   Arthritis   Asthma   Bipolar 1 disorder   Chronic pain   Cochlear hydrops   COPD   Depression   Type II diabetes mellitus   Dyspepsia   GERD   Hard of hearing   Hepatic abscess   Hyperlipidemia   Hypertension   Hypothyroidism   Irritable bowel syndrome   Meniere disease   Neuropathy   Noninfectious ileitis   Peritoneal abscess   Spinal stenosis of lumbar region   Steroid long-term use   Vaginitis, atrophic, postmenopausal   Vitamin D deficiency   Postmenopausal atrophic vaginitis  Meniere's disease   Chronic kidney disease  Iron deficiency anemia due to chronic blood loss  Temporomandibular joint disorder  Baker's cyst, left  Lower urinary tract  infectious disease  Steroid dependent for cochlear hydrops    Primary insomnia  Microalbuminuria  Leukocytosis  Ankle edema  Chronic pain syndrome-issue of broken controlled sub agreement   Bilateral leg edema worsening w her increasing inactivity   Muscle weakness  Type 2 diabetes mellitus   Class 2 obesity    Confusion  Hyperglycemia  Acute renal failure  Pneumonia of right lower lobe due to infectious organism  Severe sepsis   Type 2 diabetes mellitus with hyperglycemia   Glycosuria  Paravertebral mass  Neural foraminal stenosis of cervical spine  Multilevel spondylosis  Anxiety and depression  Slow transit constipation  Weakness  Fall, initial encounter  Hypokalemia  Contusion of coccyx, initial encounter  Claudication of both lower extremities  Spinal stenosis  Lower extremity edema  Carpal tunnel syndrome of left wrist  PTSD     Past Surgical History:    Cataract surgery   Foot surgery   GI surgery   IR lumbar transform inj bilateral   Hepatectomy   PICC insertion   Orthopedic surgery   Release carpal tunnel  Rectal surgery   Vascular surgery      Family History:    Mother: cerebrovascular disease, heart disease  Brother: heart disease  Father: heart disease     Social History:  The patient presents to the ED by means of ambulance.   PCP: Ananya Mclean     Physical Exam     Patient Vitals for the past 24 hrs:   BP Temp Temp src Pulse Resp SpO2   03/30/23 1500 116/60 -- -- 81 -- 90 %   03/30/23 1330 109/46 -- -- 86 -- 96 %   03/30/23 1300 125/73 -- -- 85 -- --   03/30/23 1230 (!) 144/93 -- -- 104 -- 93 %   03/30/23 1200 137/71 -- -- 70 -- 91 %   03/30/23 0952 134/63 96.9  F (36.1  C) Temporal 88 18 --        Physical Exam  General: Patient is awake, alert. Very hard of hearing   Head: The scalp, face, and head appear normal  Neck: Normal range of motion.   CV: Regular rate and rhythm.   Resp: Lungs are clear without wheezes or rales. No respiratory distress.   GI: Abdomen is soft, no rigidity,  guarding, or rebound. No distension. No tenderness to palpation in any quadrant.     MS: Normal tone. Joints grossly normal without effusions. No asymmetric leg swelling, calf or thigh tenderness.    Skin: No rash or lesions noted. Normal capillary refill noted  Neuro: Speech is normal and fluent. Face is symmetric. Moving all extremities.   Psych:  anxious.  Appropriate interactions.    Emergency Department Course     Imaging:  Abdomen XR, 2 vw, flat and upright   Final Result   IMPRESSION: Large stool throughout the colon. No evidence for small   bowel obstruction. No free air at decubitus imaging.           MICH CHRISTIANSON MD            SYSTEM ID:  [id number]        Report per radiology    Laboratory:  Labs Ordered and Resulted from Time of ED Arrival to Time of ED Departure   BASIC METABOLIC PANEL - Abnormal       Result Value    Sodium 141      Potassium 3.8      Chloride 100      Carbon Dioxide (CO2) 30 (*)     Anion Gap 11      Urea Nitrogen 13.6      Creatinine 0.96 (*)     Calcium 10.0      Glucose 123 (*)     GFR Estimate 60 (*)    CBC WITH PLATELETS AND DIFFERENTIAL - Abnormal    WBC Count 10.0      RBC Count 4.52      Hemoglobin 9.8 (*)     Hematocrit 36.0      MCV 80      MCH 21.7 (*)     MCHC 27.2 (*)     RDW 18.6 (*)     Platelet Count 360      % Neutrophils 74      % Lymphocytes 15      % Monocytes 6      % Eosinophils 3      % Basophils 1      % Immature Granulocytes 1      NRBCs per 100 WBC 0      Absolute Neutrophils 7.5      Absolute Lymphocytes 1.5      Absolute Monocytes 0.6      Absolute Eosinophils 0.3      Absolute Basophils 0.1      Absolute Immature Granulocytes 0.1      Absolute NRBCs 0.0            Emergency Department Course & Assessments:  Interventions:  Medications   0.9% sodium chloride BOLUS (1,000 mLs Intravenous $New Bag 3/30/23 1053)   LORazepam (ATIVAN) tablet 2 mg (2 mg Oral $Given 3/30/23 1218)   Enema Compound (docusate/mineral oil/NaPhos) NO MAG CIT PREMIX (226 mLs Rectal $Given  3/30/23 1333)   polyethylene glycol (MIRALAX) powder 238 g (238 g Oral $Given 3/30/23 1346)   bisacodyl (DULCOLAX) EC tablet 10 mg (10 mg Oral $Given 3/30/23 1341)        Independent Interpretation (X-rays, CTs, rhythm strip):  Ab X-ray shows no evidence of small bowel obstruction    Consultations/Discussion of Management or Tests:  Spoke with Asmita LEE regarding admission     Assessments:  1007 I obtained the history and examined the patient as noted above.   1305 Attempted disimpaction. Unsuccessful.     Disposition:  The patient was admitted to the hospital under the care of Dr. Ortiz.     Impression & Plan      Medical Decision Making:  Patient is an 82-year-old female who presents emergency department with constipation after carpal tunnel surgery.  Patient has been staying in a TCU where they have been treating her constipation without success.  Yesterday she received lactulose, increased dose of MiraLAX, senna and 2 enemas without relief.  She has never had this issue before her surgery.  No history of bowel obstructions.  Patient's been passing gas and has a reassuring abdominal exam without guarding, rebound, rigidity or distention.  X-ray shows no signs of small bowel obstruction.  Very low concern for small bowel obstruction at this time.  Patient underwent a bowel disimpaction at the bedside but there is no significant stool in the rectal vault for removal.  Another enema was attempted but without success.  Patient will be given an aggressive bowel regiment and admitted to the hospital for further treatment of her constipation.    Diagnosis:    ICD-10-CM    1. Slow transit constipation  K59.01              Scribe Disclosure:  I, Joshua Kjer, am serving as a scribe at 1:07 PM on 3/30/2023 to document services personally performed by Gokul Hodges MD based on my observations and the provider's statements to me.    3/30/2023   Gokul Hodges MD Battista  Gokul Espinoza MD  03/30/23 1550

## 2023-03-30 NOTE — PLAN OF CARE
ROOM # 213-2    Living Situation (if not independent, order SW consult):[facility]  Facility name: [facility]  : Davie (son)- [phone]    Activity level at baseline: Ax1, can pivot to wheelchair  Activity level on admit: Ax2.. has not been oob. States that she can stand for short amounts of time.     Who will be transporting you at discharge: son or transport    Patient registered to observation; given Patient Bill of Rights; given the opportunity to ask questions about observation status and their plan of care.  Patient has been oriented to the observation room, bathroom and call light is in place.    Discussed discharge goals and expectations with patient/family.

## 2023-03-31 LAB
ANION GAP SERPL CALCULATED.3IONS-SCNC: 10 MMOL/L (ref 7–15)
BUN SERPL-MCNC: 11.3 MG/DL (ref 8–23)
CALCIUM SERPL-MCNC: 9.3 MG/DL (ref 8.8–10.2)
CHLORIDE SERPL-SCNC: 101 MMOL/L (ref 98–107)
CREAT SERPL-MCNC: 0.87 MG/DL (ref 0.51–0.95)
DEPRECATED HCO3 PLAS-SCNC: 31 MMOL/L (ref 22–29)
GFR SERPL CREATININE-BSD FRML MDRD: 67 ML/MIN/1.73M2
GLUCOSE BLDC GLUCOMTR-MCNC: 101 MG/DL (ref 70–99)
GLUCOSE BLDC GLUCOMTR-MCNC: 108 MG/DL (ref 70–99)
GLUCOSE BLDC GLUCOMTR-MCNC: 150 MG/DL (ref 70–99)
GLUCOSE BLDC GLUCOMTR-MCNC: 90 MG/DL (ref 70–99)
GLUCOSE BLDC GLUCOMTR-MCNC: 94 MG/DL (ref 70–99)
GLUCOSE BLDC GLUCOMTR-MCNC: 95 MG/DL (ref 70–99)
GLUCOSE BLDC GLUCOMTR-MCNC: 95 MG/DL (ref 70–99)
GLUCOSE SERPL-MCNC: 96 MG/DL (ref 70–99)
POTASSIUM SERPL-SCNC: 3.7 MMOL/L (ref 3.4–5.3)
SARS-COV-2 RNA RESP QL NAA+PROBE: NEGATIVE
SODIUM SERPL-SCNC: 142 MMOL/L (ref 136–145)

## 2023-03-31 PROCEDURE — U0005 INFEC AGEN DETEC AMPLI PROBE: HCPCS | Performed by: INTERNAL MEDICINE

## 2023-03-31 PROCEDURE — 250N000013 HC RX MED GY IP 250 OP 250 PS 637: Performed by: PHYSICIAN ASSISTANT

## 2023-03-31 PROCEDURE — G0378 HOSPITAL OBSERVATION PER HR: HCPCS

## 2023-03-31 PROCEDURE — 82962 GLUCOSE BLOOD TEST: CPT | Mod: 91

## 2023-03-31 PROCEDURE — 36415 COLL VENOUS BLD VENIPUNCTURE: CPT | Performed by: PHYSICIAN ASSISTANT

## 2023-03-31 PROCEDURE — 80048 BASIC METABOLIC PNL TOTAL CA: CPT | Performed by: PHYSICIAN ASSISTANT

## 2023-03-31 PROCEDURE — 250N000012 HC RX MED GY IP 250 OP 636 PS 637: Performed by: PHYSICIAN ASSISTANT

## 2023-03-31 PROCEDURE — 99232 SBSQ HOSP IP/OBS MODERATE 35: CPT | Performed by: NURSE PRACTITIONER

## 2023-03-31 PROCEDURE — 250N000013 HC RX MED GY IP 250 OP 250 PS 637: Performed by: NURSE PRACTITIONER

## 2023-03-31 RX ORDER — HYDROXYZINE HYDROCHLORIDE 25 MG/1
25 TABLET, FILM COATED ORAL 3 TIMES DAILY PRN
Status: DISCONTINUED | OUTPATIENT
Start: 2023-03-31 | End: 2023-04-01 | Stop reason: HOSPADM

## 2023-03-31 RX ORDER — NYSTATIN 100000 U/G
CREAM TOPICAL 2 TIMES DAILY
Status: DISCONTINUED | OUTPATIENT
Start: 2023-03-31 | End: 2023-04-01 | Stop reason: HOSPADM

## 2023-03-31 RX ORDER — GLIPIZIDE 5 MG/1
5 TABLET ORAL
Status: DISCONTINUED | OUTPATIENT
Start: 2023-03-31 | End: 2023-04-01 | Stop reason: HOSPADM

## 2023-03-31 RX ORDER — OXYBUTYNIN CHLORIDE 5 MG/1
15 TABLET, EXTENDED RELEASE ORAL EVERY MORNING
Status: DISCONTINUED | OUTPATIENT
Start: 2023-04-01 | End: 2023-04-01 | Stop reason: HOSPADM

## 2023-03-31 RX ORDER — PANTOPRAZOLE SODIUM 40 MG/1
40 TABLET, DELAYED RELEASE ORAL DAILY
Status: DISCONTINUED | OUTPATIENT
Start: 2023-03-31 | End: 2023-04-01 | Stop reason: HOSPADM

## 2023-03-31 RX ORDER — DULOXETIN HYDROCHLORIDE 60 MG/1
60 CAPSULE, DELAYED RELEASE ORAL EVERY MORNING
Status: DISCONTINUED | OUTPATIENT
Start: 2023-04-01 | End: 2023-04-01 | Stop reason: HOSPADM

## 2023-03-31 RX ORDER — PREDNISONE 5 MG/1
5 TABLET ORAL EVERY MORNING
Status: DISCONTINUED | OUTPATIENT
Start: 2023-04-01 | End: 2023-03-31

## 2023-03-31 RX ORDER — TRIAMTERENE AND HYDROCHLOROTHIAZIDE 37.5; 25 MG/1; MG/1
1 CAPSULE ORAL EVERY MORNING
Status: DISCONTINUED | OUTPATIENT
Start: 2023-04-01 | End: 2023-04-01

## 2023-03-31 RX ORDER — BUSPIRONE HYDROCHLORIDE 7.5 MG/1
7.5 TABLET ORAL 3 TIMES DAILY
Status: DISCONTINUED | OUTPATIENT
Start: 2023-03-31 | End: 2023-04-01 | Stop reason: HOSPADM

## 2023-03-31 RX ADMIN — METFORMIN HYDROCHLORIDE 500 MG: 500 TABLET ORAL at 17:38

## 2023-03-31 RX ADMIN — LAMOTRIGINE 100 MG: 100 TABLET ORAL at 08:58

## 2023-03-31 RX ADMIN — BUSPIRONE HYDROCHLORIDE 7.5 MG: 15 TABLET ORAL at 15:43

## 2023-03-31 RX ADMIN — GLIPIZIDE 5 MG: 5 TABLET ORAL at 15:42

## 2023-03-31 RX ADMIN — SENNOSIDES AND DOCUSATE SODIUM 2 TABLET: 50; 8.6 TABLET ORAL at 08:58

## 2023-03-31 RX ADMIN — BUSPIRONE HYDROCHLORIDE 7.5 MG: 15 TABLET ORAL at 20:57

## 2023-03-31 RX ADMIN — PANTOPRAZOLE SODIUM 40 MG: 40 TABLET, DELAYED RELEASE ORAL at 15:41

## 2023-03-31 RX ADMIN — PREGABALIN 50 MG: 25 CAPSULE ORAL at 08:58

## 2023-03-31 RX ADMIN — LAMOTRIGINE 100 MG: 100 TABLET ORAL at 20:57

## 2023-03-31 RX ADMIN — NYSTATIN: 100000 CREAM TOPICAL at 21:27

## 2023-03-31 RX ADMIN — DIAZEPAM 2 MG: 2 TABLET ORAL at 08:58

## 2023-03-31 RX ADMIN — PREGABALIN 50 MG: 25 CAPSULE ORAL at 20:57

## 2023-03-31 RX ADMIN — SENNOSIDES AND DOCUSATE SODIUM 2 TABLET: 50; 8.6 TABLET ORAL at 20:57

## 2023-03-31 RX ADMIN — METHOCARBAMOL 500 MG: 500 TABLET ORAL at 08:58

## 2023-03-31 RX ADMIN — INSULIN ASPART 1 UNITS: 100 INJECTION, SOLUTION INTRAVENOUS; SUBCUTANEOUS at 12:21

## 2023-03-31 RX ADMIN — OXYCODONE HYDROCHLORIDE 5 MG: 5 TABLET ORAL at 07:06

## 2023-03-31 RX ADMIN — OXYCODONE HYDROCHLORIDE 10 MG: 5 TABLET ORAL at 11:48

## 2023-03-31 RX ADMIN — LACTULOSE 10 G: 20 SOLUTION ORAL at 07:02

## 2023-03-31 RX ADMIN — OXYCODONE HYDROCHLORIDE 10 MG: 5 TABLET ORAL at 15:42

## 2023-03-31 RX ADMIN — HYDROXYZINE HYDROCHLORIDE 25 MG: 25 TABLET, FILM COATED ORAL at 15:41

## 2023-03-31 ASSESSMENT — ACTIVITIES OF DAILY LIVING (ADL)
ADLS_ACUITY_SCORE: 48
ADLS_ACUITY_SCORE: 52
ADLS_ACUITY_SCORE: 41
ADLS_ACUITY_SCORE: 52
ADLS_ACUITY_SCORE: 41
ADLS_ACUITY_SCORE: 37
ADLS_ACUITY_SCORE: 48
ADLS_ACUITY_SCORE: 37
ADLS_ACUITY_SCORE: 37
ADLS_ACUITY_SCORE: 52
ADLS_ACUITY_SCORE: 45
ADLS_ACUITY_SCORE: 41

## 2023-03-31 NOTE — CONSULTS
Care Management Follow Up    Expected Discharge Date: 04/01/2023     Concerns to be Addressed: Discharge planning     Patient plan of care discussed at interdisciplinary rounds: Yes    Anticipated Discharge Disposition:  [facility]     Anticipated Discharge Services:  PT/OT    Patient/family educated on Medicare website which has current facility and service quality ratings: Yes  Education Provided on the Discharge Plan:  Yes  Patient/Family in Agreement with the Plan:  Yes    Referrals Placed by CM/SW:  None at this time  Private pay costs discussed: transportation costs    Additional Information:  SW consulted for discharge planning. Patient was admitted under observation status for constipation.     SW spoke with [facility] admissions to confirm that patient was admitted from TCU and does have a bed hold to return when medically stable. [facility] has weekend admissions and can accept patient back when medically ready.     Met with patient who is very [place] and only able to read lips. She confirms plan to return to TCU and reports that she will need medical transport arranged. Left ELENA letter at bedside.     Per patient's request, SW placed call to patient's son Davie, [phone], left  requesting a return call.     Plan: Return to TCU when medically stable.  [facility] transport to be arranged at discharge. RAJ will continue to follow.     OTILIA Olivo, [facility]   Inpatient Care Coordination  [facility]   [phone]

## 2023-03-31 NOTE — PLAN OF CARE
PRIMARY DIAGNOSIS: CONSTIPATION FOR 10 DAYS  OUTPATIENT/OBSERVATION GOALS TO BE MET BEFORE DISCHARGE:  ADLs back to baseline: No  Activity and level of assistance: 2 assist with a andrey steady  Pain status: 9/10 pain in her lower back and abdomen   Return to near baseline physical activity: No     Discharge Planner Nurse   Safe discharge environment identified: No  Barriers to discharge: Yes       Entered by: Carolyn Olmedo RN 03/31/2023 9:39 AM     Please review provider order for any additional goals. Nurse to notify provider when observation goals have been met and patient is ready for discharge.    Alert and oriented. Up with 2 assist and a andrey steady. On room air at baseline. Very hard of hearing, although she reads lips quite well. Has hearing aids in place. Right IV site saline locked. Glucose checks every four hours. Reports 9/10 pain that is in her abdomen and lower back. PRN ROBAXIN given because oxy was not due yet. PRN VALIUM given because patient reports and appears very anxious, almost tearful at times. Purewick in place, draining adequately.

## 2023-03-31 NOTE — PLAN OF CARE
PRIMARY DIAGNOSIS: CONSTIPATION FOR 10 DAYS  HAD A LARGE BM ON 3/31 ~1130.  OUTPATIENT/OBSERVATION GOALS TO BE MET BEFORE DISCHARGE:  1. ADLs back to baseline: No  2. Activity and level of assistance: 2 assist with a andrey steady  3. Pain status: 7/10 pain in her abdomen and lower back  4. Return to near baseline physical activity: No     Discharge Planner Nurse   Safe discharge environment identified: No  Barriers to discharge: Yes       Entered by: Carolyn Olmedo RN 03/31/2023 12:01 PM     Please review provider order for any additional goals. Nurse to notify provider when observation goals have been met and patient is ready for discharge.    Alert and oriented. Appears VERY anxious. Up with 2 assist and a andrey steady. On room air at baseline. Very hard of hearing, although she reads lips quite well. One hearing aid in the left ear. Right IV site saline locked. Glucose checks every four hours. Reports 7/10 pain in her abdomen and lower back. PRN oxycodone was given (yes, the patient kept on asking for a pain pill). Tried to get the patient to sit in the recliner chair and she refused. Redness and soreness under breasts and buttocks. Could be from patient straining to have a BM? Patient is asking for powder to place under her breasts, sticky note to physicians left.

## 2023-03-31 NOTE — PLAN OF CARE
PRIMARY DIAGNOSIS: Constipation  OUTPATIENT/OBSERVATION GOALS TO BE MET BEFORE DISCHARGE:  ADLs back to baseline: No    Activity and level of assistance: Ass of 2    Pain status: Pain free.    Return to near baseline physical activity: No     Discharge Planner Nurse   Safe discharge environment identified: Yes  Barriers to discharge: No    Blood pressure 134/58, pulse 73, temperature 97.4  F (36.3  C), temperature source Axillary, resp. rate 18, weight 87.1 kg (192 lb), SpO2 94 %, not currently breastfeeding.        Entered by: Ky Mcmullen RN 03/31/2023 4:47 AM  Pt alert and oriented x4. Pt hard of hearing. Benefits from lip reading. No BM during the night despite miralax. Lactulose will be given this AM. Denies pain. Slept throughout the night. Continue to monitor.     Please review provider order for any additional goals.   Nurse to notify provider when observation goals have been met and patient is ready for discharge.Goal Outcome Evaluation:

## 2023-03-31 NOTE — PLAN OF CARE
PRIMARY DIAGNOSIS: Constipation  OUTPATIENT/OBSERVATION GOALS TO BE MET BEFORE DISCHARGE:  ADLs back to baseline: No    Activity and level of assistance: Up with maximum assistance. Consider SW and/or PT evaluation.     Pain status: Improved-controlled with oral pain medications.    Return to near baseline physical activity: No     Discharge Planner Nurse   Safe discharge environment identified: Yes  Barriers to discharge: Yes       Entered by: Lacie Sterling RN 3/30/2023  Pt AO x4, some confusion noted. Pt very [place], hearing aides in. VSS on 2L O2. LS clear, BS active. Pt up with A2 and Caitlin Steady. PIV SL. Tolerating clear liquid diet. Pt currently working on bowel prep. Plan is for SW consult. Will continue to monitor and provide supportive cares.   Please review provider order for any additional goals.   Nurse to notify provider when observation goals have been met and patient is ready for discharge.

## 2023-03-31 NOTE — PHARMACY-ADMISSION MEDICATION HISTORY
Admission medication history interview status for this patient is complete. See [facility] admission navigator for allergy information, prior to admission medications and immunization status.     Medication history interview done, indicate source(s): [facility]  Medication history resources (including written lists, pill bottles, clinic record):EPIC    Changes made to PTA medication list:  Added: none  Changed: none  Removed: none    Additional medication history information: when last doses taken not known    Medication reconciliation/reorder completed by provider prior to medication history?  Y     Prior to Admission medications    Medication Sig Last Dose Taking? Auth Provider Long Term End Date   acetaminophen (TYLENOL) 325 MG tablet Take 2 tablets (650 mg) by mouth every 4 hours as needed for other (For optimal non-opioid multimodal pain management to improve pain control.)  Yes Jenise Sawyer PA-C     busPIRone (BUSPAR) 7.5 MG tablet Take 7.5 mg by mouth 3 times daily  Yes Reported, Patient Yes    calamine 8-8 % lotion Apply topically every hour as needed for itching  Yes Felipe Philippe MD     clobetasol (TEMOVATE) 0.05 % external cream Apply topically 2 times daily  Yes Reported, Patient     cyanocobalamin 1000 MCG SUBL Place 1,000 mcg under the tongue daily  Yes Unknown, Entered By History     diazepam (VALIUM) 2 MG tablet Take 1 tablet (2 mg) by mouth every 6 hours as needed for anxiety  Yes Jenise Sawyer PA-C No    DULoxetine (CYMBALTA) 60 MG capsule Take 60 mg by mouth every morning  Yes Reported, Patient No    glipiZIDE (GLUCOTROL) 5 MG tablet Take 1 tablet (5 mg) by mouth 2 times daily (before meals)  Yes Diamond Simmons APRN CNP Yes    hydrOXYzine (ATARAX) 25 MG tablet Take 25 mg by mouth 3 times daily as needed for anxiety  Yes Unknown, Entered By History     JARDIANCE 25 MG TABS tablet Take 25 mg by mouth every morning  Yes Reported, Patient     lactulose (CEPHULAC) 20 GM  packet Take 1 packet (20 g) by mouth daily as needed for constipation  Yes Diamond Simmons APRN CNP     lamoTRIgine (LAMICTAL) 100 MG tablet Take 100 mg by mouth 2 times daily  Yes Unknown, Entered By History Yes    metFORMIN (GLUCOPHAGE) 500 MG tablet Take 500 mg by mouth 2 times daily (with meals)  Yes Unknown, Entered By History No    methocarbamol (ROBAXIN) 500 MG tablet Take 1 tablet (500 mg) by mouth 3 times daily as needed for muscle spasms  Yes Felipe Philippe MD     miconazole (MICATIN) 2 % external powder Apply topically 2 times daily as needed To the skin fold  Yes Ml López MD     naloxone (NARCAN) 4 MG/0.1ML nasal spray Spray 1 spray (4 mg) into one nostril alternating nostrils as needed for opioid reversal every 2-3 minutes until assistance arrives  Yes IrisDiamond, JUAN ANTONIO CNP Yes    omeprazole (PRILOSEC) 20 MG DR capsule Take 20 mg by mouth daily  Yes Reported, Patient     oxybutynin ER (DITROPAN XL) 15 MG 24 hr tablet Take 15 mg by mouth every morning  Yes Unknown, Entered By History     oxyCODONE (ROXICODONE) 5 MG tablet Take 1-2 tablets (5-10 mg) by mouth every 3 hours as needed for moderate pain (4-6) 1 if pain rated 1-5 & 2 if 6-10.  Yes Jenise Sawyer PA-C No    polyethylene glycol (MIRALAX) 17 g packet Take 1 packet by mouth daily  Yes Reported, Patient     predniSONE (DELTASONE) 1 MG tablet Take 1 mg by mouth every morning (In addition to the 5 mg dose; 1 mg + 5 mg = 6 mg)  Yes Unknown, Entered By History No    predniSONE (DELTASONE) 5 MG tablet Take 5 mg by mouth every morning (In addition to the 1 mg dose; 5 mg + 1 mg = 6 mg)  Yes Reported, Patient No    pregabalin (LYRICA) 50 MG capsule Take 1 capsule (50 mg) by mouth 2 times daily  Yes Kalen Soares MD Yes    senna-docusate (SENOKOT-S/PERICOLACE) 8.6-50 MG tablet Take 1 tablet by mouth 2 times daily  Yes Rebecca Sutton MD     triamcinolone (KENALOG) 0.1 % external cream Apply topically 2 times daily as needed  Yes  Reported, Patient     triamterene-HCTZ (DYAZIDE) 37.5-25 MG capsule Take 1 capsule by mouth every morning Hold for SBP<110  Yes Reported, Patient No    vitamin D3 (CHOLECALCIFEROL) 50 mcg (2000 units) tablet Take 3 tablets by mouth daily  Yes Unknown, Entered By History     blood glucose (ACCU-CHEK FASTCLIX) lancing device FOR TESTING ONCE DAILY. DX  E11.9 TYPE 2 DIABETES   Reported, Patient     blood glucose (CONTOUR TEST) test strip TESTING EVERY DAY DX  E11.9   Reported, Patient     CONTOUR NEXT TEST test strip TESTING EVERY DAY DX  E11.9   Reported, Patient     NONFORMULARY Take 1 tablet by mouth every morning MEDICATION NAME: Buffered Berberine 350 mg  Patient not taking: Reported on 3/29/2023   Reported, Patient     order for DME Equipment being ordered: wrist brace  Patient not taking: Reported on 10/25/2022   Leah Feliz PA-C

## 2023-03-31 NOTE — PROGRESS NOTES
[facility]    Medicine Progress Note - Hospitalist Service    Date of Admission:  3/30/2023    Assessment & Plan   Jumana Yang is a 82 year old female who is extremely hard of hearing (able to read lips) with PMH significant for chronic pain, h/o spinal stenosis (follows with a pain clinic), anxiety, depression, bipolar disorder, DM2, hypertension, COPD, asthma, IBS and who was recenlty admitted from 3/21-3/27/2023 at [facility] for left open carpal tunnel release (3/21/2023) and ultimately discharged to TCU who presents to the ED from 3/30/2023 from [facility] TCU with concern for constipation.        #Slow transit constipation  #IBS: reported no BM for 9 days without associated nausea, vomiting, or abdominal pain. Patient received enema, lactulose, miralax, and senna BID at TCU on 2/29 without results. Abdominal x-ray in ED shows large stool throughout but without evidence of obstruction. Manual disimpaction attempted in ED without success. Suspect related to lack of mobility while admitted and on increased doses of narcotics postoperatively.   -received bisacodyl 10 mg, enema, and started on Miralax prep in ED and started on  Miralax prep last night.  -start Lactulose TID hold if having BM or diarrhea   -scheduled Senna BID  - consider starting Relistor in the AM if no significant relief.   -CLD until adequate stool output  -encourage mobility to stimulate bowels   -monitor I&Os     #Altered mental status:  Resolved patient intermittently falling asleep while interviewing in the ED, suspect due to receiving Ativan prior to manual disimpaction. She is able to answer questions appropriately but intermittently forgetful. Suspected compounded by being hard of hearing.     #S/p left open carpel tunnel release (3/21/2023)  -surgery by Dr. Hamilton at [facility] without complication  -no pushing, pulling, lifting, torquing more than 2 pounds with left hand.    -pain team consulted during recent  "stay due to chronic pain syndrome  -keep scheduled f/u with surgeon in 2 weeks      #Acute on chronic anemia: acute anemia suspected due to blood loss from recent surgery.  -Hgb stable in 9 range since surgery, currently 9.8 (baseline around 10)     #Intertrigo: Area of erythema noted in skin folds and underneath breasts.    -Start Nystatin topical.     #Chronic pain  #Arthritis  #Severe spinal stenosis s/p surgical intervention  #Peripheral neuropathy: follows with pain clinic, regimen recently adjusted due to recent surgery by pain team at [facility].   -continue PTA Tylenol, Lyrica, Prednisone, and PRN Robaxin  -currently on Oxycodone 5-10 mg every 3 hours PRN (PTA taking Percocet 5-325 mg every 6 hours PRN)      #Bipolar 1 disorder  #Depression  #Anxiety: resume PTA Buspar, Valium, Cymbalta, Atarax, and Lamictal      #DM2: most recent HgbA1c of 6.9, FSG   -continue PTA Glipizide, Metformin, and Jardiance  -medium correctional scale coverage.      #HTN  #Chronic LE edema: BP stable  -continue PTA Dyazide with parameters     #GERD: resume PTA PPI     #COPD, chronic hypercapnia   #Asthma: CO2 chronically elevated on labs, no current exacerbation. Lungs quiescent      #Overactive bladder: resume PTA oxybutynin      # Overweight: Estimated body mass index is 28.35 kg/m  as calculated from the following:    Height as of 3/29/23: 1.753 m (5' 9\").    Weight as of this encounter: 87.1 kg (192 lb).         Diet: Clear Liquid Diet    DVT Prophylaxis: mechanical  Teran Catheter: Not present  Lines: None     Cardiac Monitoring: None  Code Status: Full Code        Disposition Plan      Expected Discharge Date: 04/01/2023                The patient's care was discussed with the Bedside Nurse, Care Coordinator/ and Patient.    JUAN ANTONIO [facility]  Hospitalist Service  [facility]  Securely message with [employer] (more info)  Text page via [facility] Paging/Directory "   ______________________________________________________________________    Interval History   Assumed care of patient.  Still without bm.  Endorses pain.    Needs to mobilize.     Physical Exam   Vital Signs: Temp: 98.3  F (36.8  C) Temp src: Oral BP: 137/59 Pulse: 79   Resp: 16 SpO2: 94 % O2 Device: None (Room air) Oxygen Delivery: 1.5 LPM  Weight: 192 lbs 0 oz    GEN: Awake and alert. Follows commands.  No acute distress.   HEENT:  Normocephalic/atraumatic, no scleral icterus, no nasal discharge, mouth moist.  CV:  Regular rate and rhythm, no murmur or JVD.  S1 + S2 noted, no S3 or S4.  LUNGS:  Clear to auscultation bilaterally without rales/rhonchi/wheezing/retractions.  Symmetric chest rise on inhalation noted.  ABD: normoactive bowel sounds, full but not distended, non-tender, soft. No rebound/guarding/rigidity.  EXT: bilateral LE lymphedema. No cyanosis.  No acute joint synovitis noted. Left UE in ACE wrap, able to wiggle fingers without difficulty, 2+ bilateral radial pulses.   SKIN:  Dry to touch, no exanthems noted in the visualized areas. Skin folds and area underneath breasts with mild erythema.   NEURO: difficult to complete full assessment due to somnolence and patient being hard of hearing, no focal deficits appreciated on limited exam     Medical Decision Making       40 MINUTES SPENT BY ME on the date of service doing chart review, history, exam, documentation & further activities per the note.      Data     I have personally reviewed the following data over the past 24 hrs:    N/A  \   N/A   / N/A     142 101 11.3 /  150 (H)   3.7 31 (H) 0.87 \       Imaging results reviewed over the past 24 hrs:   No results found for this or any previous visit (from the past 24 hour(s)).

## 2023-03-31 NOTE — PLAN OF CARE
PRIMARY DIAGNOSIS: CONSTIPATION FOR 10 DAYS  HAS HAD MULTIPLE BOWEL MOVEMENTS THROUGHOUT 3/31.  OUTPATIENT/OBSERVATION GOALS TO BE MET BEFORE DISCHARGE:  1. ADLs back to baseline: No  2. Activity and level of assistance: 2 assist with a andrey steady  3. Pain status: 7/10 pain in her abdomen and lower back  4. Return to near baseline physical activity: No     Discharge Planner Nurse   Safe discharge environment identified: No  Barriers to discharge: Yes       Entered by: Carolyn Olmedo RN 03/31/2023 4:39 PM     Please review provider order for any additional goals. Nurse to notify provider when observation goals have been met and patient is ready for discharge.    Alert and oriented. Appears VERY anxious. Up with 2 assist and a andrey steady. On room air at baseline. Very hard of hearing, although she reads lips quite well. One hearing aid in the left ear. Right IV site saline locked. Glucose checks every four hours. Reports 7/10 pain in her abdomen and lower back. PRN ATARAX and ROXICODONE was given (yes, the patient wanted a pain and anxiety pill). Tried to get the patient to sit in the recliner chair and she refused. Redness and soreness under breasts and buttocks, nystatin cream applied under breasts. Incontinent.

## 2023-04-01 VITALS
SYSTOLIC BLOOD PRESSURE: 140 MMHG | TEMPERATURE: 99 F | RESPIRATION RATE: 18 BRPM | DIASTOLIC BLOOD PRESSURE: 54 MMHG | HEART RATE: 84 BPM | BODY MASS INDEX: 28.35 KG/M2 | WEIGHT: 192 LBS | OXYGEN SATURATION: 97 %

## 2023-04-01 LAB
GLUCOSE BLDC GLUCOMTR-MCNC: 105 MG/DL (ref 70–99)
GLUCOSE BLDC GLUCOMTR-MCNC: 123 MG/DL (ref 70–99)
GLUCOSE BLDC GLUCOMTR-MCNC: 159 MG/DL (ref 70–99)
GLUCOSE BLDC GLUCOMTR-MCNC: 185 MG/DL (ref 70–99)

## 2023-04-01 PROCEDURE — G0378 HOSPITAL OBSERVATION PER HR: HCPCS

## 2023-04-01 PROCEDURE — 99239 HOSP IP/OBS DSCHRG MGMT >30: CPT | Performed by: NURSE PRACTITIONER

## 2023-04-01 PROCEDURE — 250N000013 HC RX MED GY IP 250 OP 250 PS 637: Performed by: NURSE PRACTITIONER

## 2023-04-01 PROCEDURE — 250N000012 HC RX MED GY IP 250 OP 636 PS 637: Performed by: EMERGENCY MEDICINE

## 2023-04-01 PROCEDURE — 82962 GLUCOSE BLOOD TEST: CPT

## 2023-04-01 PROCEDURE — 250N000013 HC RX MED GY IP 250 OP 250 PS 637: Performed by: PHYSICIAN ASSISTANT

## 2023-04-01 RX ORDER — POLYETHYLENE GLYCOL 3350 17 G/17G
17 POWDER, FOR SOLUTION ORAL 2 TIMES DAILY
Qty: 510 G | DISCHARGE
Start: 2023-04-01 | End: 2023-04-03

## 2023-04-01 RX ORDER — OXYCODONE HYDROCHLORIDE 5 MG/1
5-10 TABLET ORAL
Qty: 6 TABLET | Refills: 0 | Status: SHIPPED | OUTPATIENT
Start: 2023-04-01 | End: 2023-04-03

## 2023-04-01 RX ORDER — PREGABALIN 50 MG/1
50 CAPSULE ORAL 2 TIMES DAILY
Qty: 6 CAPSULE | Refills: 0 | Status: SHIPPED | OUTPATIENT
Start: 2023-04-01 | End: 2023-04-03

## 2023-04-01 RX ORDER — LACTULOSE 10 G/15ML
10 SOLUTION ORAL EVERY 8 HOURS
DISCHARGE
Start: 2023-04-01 | End: 2023-04-03

## 2023-04-01 RX ORDER — ONDANSETRON 4 MG/1
4 TABLET, ORALLY DISINTEGRATING ORAL EVERY 6 HOURS PRN
DISCHARGE
Start: 2023-04-01 | End: 2024-01-28

## 2023-04-01 RX ORDER — DIAZEPAM 2 MG
2 TABLET ORAL EVERY 6 HOURS PRN
Qty: 10 TABLET | Refills: 0 | Status: SHIPPED | OUTPATIENT
Start: 2023-04-01

## 2023-04-01 RX ORDER — TRIAMTERENE/HYDROCHLOROTHIAZID 37.5-25 MG
1 TABLET ORAL DAILY
Status: DISCONTINUED | OUTPATIENT
Start: 2023-04-01 | End: 2023-04-01 | Stop reason: HOSPADM

## 2023-04-01 RX ADMIN — METFORMIN HYDROCHLORIDE 500 MG: 500 TABLET ORAL at 09:34

## 2023-04-01 RX ADMIN — PREDNISONE 6 MG: 1 TABLET ORAL at 09:34

## 2023-04-01 RX ADMIN — OXYCODONE HYDROCHLORIDE 10 MG: 5 TABLET ORAL at 00:10

## 2023-04-01 RX ADMIN — PANTOPRAZOLE SODIUM 40 MG: 40 TABLET, DELAYED RELEASE ORAL at 09:34

## 2023-04-01 RX ADMIN — GLIPIZIDE 5 MG: 5 TABLET ORAL at 09:35

## 2023-04-01 RX ADMIN — EMPAGLIFLOZIN 25 MG: 25 TABLET, FILM COATED ORAL at 09:34

## 2023-04-01 RX ADMIN — TRIAMTERENE AND HYDROCHLOROTHIAZIDE 1 TABLET: 37.5; 25 TABLET ORAL at 13:04

## 2023-04-01 RX ADMIN — OXYCODONE HYDROCHLORIDE 10 MG: 5 TABLET ORAL at 04:16

## 2023-04-01 RX ADMIN — OXYCODONE HYDROCHLORIDE 10 MG: 5 TABLET ORAL at 09:33

## 2023-04-01 RX ADMIN — OXYBUTYNIN CHLORIDE 15 MG: 5 TABLET, EXTENDED RELEASE ORAL at 09:34

## 2023-04-01 RX ADMIN — LAMOTRIGINE 100 MG: 100 TABLET ORAL at 09:35

## 2023-04-01 RX ADMIN — PREGABALIN 50 MG: 25 CAPSULE ORAL at 09:34

## 2023-04-01 RX ADMIN — BUSPIRONE HYDROCHLORIDE 7.5 MG: 15 TABLET ORAL at 09:34

## 2023-04-01 RX ADMIN — DULOXETINE HYDROCHLORIDE 60 MG: 60 CAPSULE, DELAYED RELEASE ORAL at 09:34

## 2023-04-01 ASSESSMENT — ACTIVITIES OF DAILY LIVING (ADL)
ADLS_ACUITY_SCORE: 52
ADLS_ACUITY_SCORE: 48
ADLS_ACUITY_SCORE: 52
ADLS_ACUITY_SCORE: 45
ADLS_ACUITY_SCORE: 45
ADLS_ACUITY_SCORE: 52

## 2023-04-01 NOTE — PLAN OF CARE
Patient's After Visit Summary was reviewed with patient.   Patient verbalized understanding of After Visit Summary, recommended follow up and was given an opportunity to ask questions.   Discharge medications sent home with patient/family: No   Discharged with other:[facility] transport

## 2023-04-01 NOTE — PROGRESS NOTES
PRIMARY DIAGNOSIS: severe constipation   OUTPATIENT/OBSERVATION GOALS TO BE MET BEFORE DISCHARGE:  ADLs back to baseline: No     Activity and level of assistance: Ax2 w/ andrey steady.      Pain status: Improved-controlled with oral pain medications.     Return to near baseline physical activity: No          Discharge Planner Nurse   Safe discharge environment identified: No  Barriers to discharge: Yes       Entered by: Haylee Lyn RN 03/31/2023     VSs on RA. AxOx4, very anxious. Up Ax2 w/ andrey steady. PRN Oxycodone given x2 for pain. Denies n/v/SOB/chest pain. PIV SL. Pt had 3 BM overnight, voiding adequately. Weight/movement restrictions on L wrist d/t recent surgery. ACE wrap in place, pt needs frequent reminders of restrictions. Tolerating mod carb diet. Pt bed being held at TCU, will discharge there once medically stable. Will continue to provide supportive cares.      Please review provider order for any additional goals.   Nurse to notify provider when observation goals have been met and patient is ready for discharge.

## 2023-04-01 NOTE — PROGRESS NOTES
Care Management Discharge Note    Discharge Date: 04/01/2023       Discharge Disposition:  Patient will return to [facility] TCU:      Discharge Transportation:  [facility] wheelchair [id number]    Private pay costs discussed: transportation costs      Handoff Referral Completed: No    Additional Information:  CM following for discharge planning. Per MD, patient will be ready for discharge back to Transitional Care Unit today. Call placed and spoke to Jennifer in admissions. They are able to accept her back today after 1400. Call placed and wheelchair transport was arranged for 1400 today. Updated [facility] of transport time. MD to place orders and update son. No further needs at this time.     1130: Orders were faxed via [employer] to [facility].        Claire Garza RN BSN CM  Inpatient Care Coordination  [facility]  [phone]

## 2023-04-01 NOTE — PLAN OF CARE
PRIMARY DIAGNOSIS: CONSTIPATION   OUTPATIENT/OBSERVATION GOALS TO BE MET BEFORE DISCHARGE:  1. Pain Status: Improved-controlled with oral pain medications.    2. Return to near baseline physical activity: No    3. Cleared for discharge by consultants (if involved): Yes    Discharge Planner Nurse   Safe discharge environment identified: Yes  Barriers to discharge: Yes       Entered by: BLAYNE STRATTON RN 04/01/2023    Vital signs:  Temp: 98.6  F (37  C) Temp src: Oral BP: 129/55 Pulse: 80   Resp: 18 SpO2: 93 % Alert and oriented x4. Forgetful. Very [place]. Able communicate with lip reading. Had Multiple BM over night and this morning. Left arm ACE wrapped following surgery with previous hospitalization. Oxycodone given for left arm pain. On Mod carb diet. BG Q4 hrs. PIV saline locked. Plan for discharge back TCU where pt came from today. Will continue to monitor.         Please review provider order for any additional goals.   Nurse to notify provider when observation goals have been met and patient is ready for discharge.

## 2023-04-01 NOTE — DISCHARGE SUMMARY
[facility]  Hospitalist Discharge Summary      Date of Admission:  3/30/2023  Date of Discharge:  4/1/2023  Discharging Provider: JUAN ANTONIO Nguyen CNP  Discharge Service: Hospitalist Service    Discharge Diagnoses   Acute on chronic constipation--multifactorial: Slow transit constipation as well as opioid induced.  IBS with constipation  Status post left open carpal tunnel release (3/21/2023)  Acute on chronic anemia  Intertrigo  Chronic pain syndrome/failed back syndrome  Bipolar type I disorder  Depression  Anxiety  Type 2 diabetes  Essential hypertension  Chronic lower extremity edema  GERD  COPD with chronic hypercapnia  Asthma  Overactive bladder  Overweight    Follow-ups Needed After Discharge   Follow-up Appointments     Follow Up and recommended labs and tests      Follow up with [facility] physician.  The following labs/tests are   recommended: CBC and BMP in 1 week with results to care team.             Unresulted Labs Ordered in the Past 30 Days of this Admission     No orders found from 2/28/2023 to 3/31/2023.      These results will be followed up by NA    Discharge Disposition   Discharged to rehabilitation facility -- [facility]  Condition at discharge: Stable      Hospital Course   Jumana Yang is a 82 year old female who is extremely hard of hearing (able to read lips) with PMH significant for chronic pain, h/o spinal stenosis (follows with a pain clinic), anxiety, depression, bipolar disorder, DM2, hypertension, COPD, asthma, IBS and who was recenlty admitted from 3/21-3/27/2023 at [facility] for left open carpal tunnel release (3/21/2023) and ultimately discharged to TCU who presents to the ED from 3/30/2023 from [facility] TCU with concern for constipation.        #Slow transit constipation/opioid induced  #IBS: reported no BM for 9 days without associated nausea, vomiting, or abdominal pain. Patient received enema, lactulose, miralax, and senna BID at TCU on 2/29  without results. Abdominal x-ray in ED shows large stool throughout but without evidence of obstruction. Manual disimpaction attempted in ED without success. Suspect related to lack of mobility while admitted and on increased doses of narcotics postoperatively. Received bisacodyl 10 mg, enema, and started on Miralax prep in ED and started on  Miralax prep and lactulose TID. Has had significant bowel movements since being treated.  If ongoing issues or if she becomes refractory to treatment consider starting Relistor.       #S/p left open carpel tunnel release (3/21/2023)  -surgery by Dr. Hamilton at [facility] without complication  -no pushing, pulling, lifting, torquing more than 2 pounds with left hand.    -pain team consulted during recent stay due to chronic pain syndrome  -keep scheduled f/u with surgeon in 2 weeks      #Acute on chronic anemia: acute anemia suspected due to blood loss from recent surgery.  -Hgb stable in 9 range since surgery, currently 9.8 (baseline around 10)     #Intertrigo: Area of erythema noted in skin folds and underneath breasts.    - Treat with nystatin cream or miconazole powder PRN.      #Chronic pain  #Arthritis  #Severe spinal stenosis s/p surgical intervention  #Peripheral neuropathy: follows with pain clinic, regimen recently adjusted due to recent surgery by pain team at [facility].   -continue PTA Tylenol, Lyrica, Prednisone, and PRN Robaxin  -currently on Oxycodone 5-10 mg every 3 hours PRN (PTA taking Percocet 5-325 mg every 6 hours PRN)      #Bipolar 1 disorder  #Depression  #Anxiety: resume PTA Buspar, Valium, Cymbalta, Atarax, and Lamictal      #DM2: most recent HgbA1c of 6.9, FSG   -continue PTA Glipizide, Metformin, and Jardiance  -medium correctional scale coverage while in hospital but will not discharge on insulin.       #HTN  #Chronic LE edema: BP stable  -continue PTA Dyazide with parameters     #GERD: resume PTA PPI     #COPD, chronic hypercapnia   #Asthma:  "CO2 chronically elevated on labs, no current exacerbation. Lungs quiescent      #Overactive bladder: resume PTA oxybutynin      # Overweight: Estimated body mass index is 28.35 kg/m  as calculated from the following:    Height as of 3/29/23: 1.753 m (5' 9\").    Weight as of this encounter: 87.1 kg (192 lb).        Consultations This Hospital Stay   CARE MANAGEMENT / SOCIAL WORK IP CONSULT  PHYSICAL THERAPY ADULT IP CONSULT  OCCUPATIONAL THERAPY ADULT IP CONSULT    Code Status   Full Code    Time Spent on this Encounter   I, JUAN ANTONIO Nguyen CNP, personally saw the patient today and spent greater than 30 minutes discharging this patient.       JUAN ANTONIO Nguyen CNP  [facility] OBSERVATION DEPT  [address] [place] [address]  Phone: [phone]  ______________________________________________________________________    Physical Exam   Vital Signs: Temp: 98.6  F (37  C) Temp src: Oral BP: 129/55 Pulse: 80   Resp: 18 SpO2: 93 % O2 Device: None (Room air)    Weight: 192 lbs 0 oz  GEN: Awake and alert. Follows commands.  No acute distress.   HEENT:  Normocephalic/atraumatic, no scleral icterus, no nasal discharge, mouth moist.  CV:  Regular rate and rhythm, no murmur or JVD.  S1 + S2 noted, no S3 or S4.  LUNGS:  Clear to auscultation bilaterally without rales/rhonchi/wheezing/retractions.  Symmetric chest rise on inhalation noted.  ABD: normoactive bowel sounds, full but not distended, non-tender, soft. No rebound/guarding/rigidity.  EXT: bilateral LE lymphedema. No cyanosis.  No acute joint synovitis noted. Left UE in ACE wrap, able to wiggle fingers without difficulty, 2+ bilateral radial pulses.   SKIN:  Dry to touch, no exanthems noted in the visualized areas. Skin folds and area underneath breasts with mild erythema.   NEURO: difficult to complete full assessment due to somnolence and patient being hard of hearing, no focal deficits appreciated on limited exam            "   Primary Care Physician   Ananya Mclean    Discharge Orders      General info for SNF    Length of Stay Estimate: Short Term Care: Estimated # of Days <30  Condition at Discharge: Stable  Level of care:skilled   Rehabilitation Potential: Fair  Admission H&P remains valid and up-to-date: Yes  Recent Chemotherapy: N/A  Use Nursing Home Standing Orders: Yes     Mantoux instructions    Give two-step Mantoux (PPD) Per Facility Policy Yes     Follow Up and recommended labs and tests    Follow up with group home physician.  The following labs/tests are recommended: CBC and BMP in 1 week with results to care team.     Reason for your hospital stay    Constipation -- opiate induced  IBS with constipation  Acute on chronic anemia  Chronic pain syndrome  Bipolar disorder  Spinal stensosis  T2DM     Activity - Up ad timothy     Additional Discharge Instructions    surgery by Dr. Hamilton at [facility] without complication  -no pushing, pulling, lifting, torquing more than 2 pounds with left hand.    -pain team consulted during recent stay due to chronic pain syndrome  -keep scheduled f/u with surgeon in 2 weeks     Physical Therapy Adult Consult    Evaluate and treat as clinically indicated.    Reason:  deconditioning, recent carpal tunnel sx.     Occupational Therapy Adult Consult    Evaluate and treat as clinically indicated.    Reason:  deconditioning, recent carpal tunnel sx.     Fall precautions     Diet    Follow this diet upon discharge: Orders Placed This Encounter      Moderate Consistent Carb (60 g CHO per Meal) Diet       Significant Results and Procedures   Most Recent 3 CBC's:Recent Labs   Lab Test 03/30/23  1044 03/30/23  0553 03/23/23  0848 03/22/23  0753 07/03/22  2204   WBC 10.0 8.7  --   --  16.2*   HGB 9.8* 9.4* 9.0*   < > 10.8*   MCV 80 81  --   --  81    348  --   --  423    < > = values in this interval not displayed.     Most Recent 3 BMP's:Recent Labs   Lab Test 04/01/23  0848  04/01/23  0646 03/31/23  2230 03/31/23  0548 03/31/23  0535 03/30/23  1822 03/30/23  1044 03/30/23  0553   NA  --   --   --   --  142  --  141 141   POTASSIUM  --   --   --   --  3.7  --  3.8 3.6   CHLORIDE  --   --   --   --  101  --  100 101   CO2  --   --   --   --  31*  --  30* 31*   BUN  --   --   --   --  11.3  --  13.6 14.0   CR  --   --   --   --  0.87  --  0.96* 0.93   ANIONGAP  --   --   --   --  10  --  11 9   MACY  --   --   --   --  9.3  --  10.0 9.8   * 123* 95   < > 96   < > 123* 116*    < > = values in this interval not displayed.   ,   Results for orders placed or performed during the hospital encounter of 03/30/23   Abdomen XR, 2 vw, flat and upright    Narrative    ABDOMEN TWO VIEWS   3/30/2023 11:51 AM     HISTORY: Rule out obstruction, pain.    COMPARISON: CT abdomen and pelvis 4/22/2022.      Impression    IMPRESSION: Large stool throughout the colon. No evidence for small  bowel obstruction. No free air at decubitus imaging.        MICH CHRISTIANSON MD         SYSTEM ID:  [id number]       Discharge Medications   Current Discharge Medication List      START taking these medications    Details   lactulose (CHRONULAC) 10 GM/15ML solution Take 15 mLs (10 g) by mouth every 8 hours    Associated Diagnoses: Drug-induced constipation; Irritable bowel syndrome with constipation      ondansetron (ZOFRAN ODT) 4 MG ODT tab Take 1 tablet (4 mg) by mouth every 6 hours as needed for nausea or vomiting    Associated Diagnoses: Drug-induced constipation; Irritable bowel syndrome with constipation         CONTINUE these medications which have CHANGED    Details   diazepam (VALIUM) 2 MG tablet Take 1 tablet (2 mg) by mouth every 6 hours as needed for anxiety  Qty: 10 tablet, Refills: 0    Associated Diagnoses: Carpal tunnel syndrome of left wrist      oxyCODONE (ROXICODONE) 5 MG tablet Take 1-2 tablets (5-10 mg) by mouth every 3 hours as needed for moderate pain (4-6) 1 if pain rated 1-5 & 2 if 6-10.  Qty: 6  tablet, Refills: 0    Associated Diagnoses: Carpal tunnel syndrome of left wrist      polyethylene glycol (MIRALAX) 17 GM/Dose powder Take 17 g by mouth 2 times daily  Qty: 510 g, Refills: PRN    Associated Diagnoses: Drug-induced constipation; Irritable bowel syndrome with constipation      pregabalin (LYRICA) 50 MG capsule Take 1 capsule (50 mg) by mouth 2 times daily  Qty: 6 capsule, Refills: 0    Associated Diagnoses: Chronic pain syndrome         CONTINUE these medications which have NOT CHANGED    Details   acetaminophen (TYLENOL) 325 MG tablet Take 2 tablets (650 mg) by mouth every 4 hours as needed for other (For optimal non-opioid multimodal pain management to improve pain control.)  Qty: 100 tablet, Refills: 0    Associated Diagnoses: Carpal tunnel syndrome of left wrist      busPIRone (BUSPAR) 7.5 MG tablet Take 7.5 mg by mouth 3 times daily      calamine 8-8 % lotion Apply topically every hour as needed for itching  Qty: 118 mL, Refills: 0    Associated Diagnoses: Spinal stenosis, unspecified spinal region      clobetasol (TEMOVATE) 0.05 % external cream Apply topically 2 times daily      cyanocobalamin 1000 MCG SUBL Place 1,000 mcg under the tongue daily      DULoxetine (CYMBALTA) 60 MG capsule Take 60 mg by mouth every morning      glipiZIDE (GLUCOTROL) 5 MG tablet Take 1 tablet (5 mg) by mouth 2 times daily (before meals)    Associated Diagnoses: Type 2 diabetes mellitus with diabetic nephropathy, without long-term current use of insulin (H)      hydrOXYzine (ATARAX) 25 MG tablet Take 25 mg by mouth 3 times daily as needed for anxiety      JARDIANCE 25 MG TABS tablet Take 25 mg by mouth every morning      lamoTRIgine (LAMICTAL) 100 MG tablet Take 100 mg by mouth 2 times daily      metFORMIN (GLUCOPHAGE) 500 MG tablet Take 500 mg by mouth 2 times daily (with meals)      methocarbamol (ROBAXIN) 500 MG tablet Take 1 tablet (500 mg) by mouth 3 times daily as needed for muscle spasms  Qty: 60 tablet,  Refills: 0    Associated Diagnoses: Spinal stenosis, unspecified spinal region      miconazole (MICATIN) 2 % external powder Apply topically 2 times daily as needed To the skin fold      naloxone (NARCAN) 4 MG/0.1ML nasal spray Spray 1 spray (4 mg) into one nostril alternating nostrils as needed for opioid reversal every 2-3 minutes until assistance arrives  Qty: 0.2 mL      omeprazole (PRILOSEC) 20 MG DR capsule Take 20 mg by mouth daily      oxybutynin ER (DITROPAN XL) 15 MG 24 hr tablet Take 15 mg by mouth every morning      !! predniSONE (DELTASONE) 1 MG tablet Take 1 mg by mouth every morning (In addition to the 5 mg dose; 1 mg + 5 mg = 6 mg)      !! predniSONE (DELTASONE) 5 MG tablet Take 5 mg by mouth every morning (In addition to the 1 mg dose; 5 mg + 1 mg = 6 mg)      senna-docusate (SENOKOT-S/PERICOLACE) 8.6-50 MG tablet Take 1 tablet by mouth 2 times daily  Qty: 30 tablet, Refills: 0    Associated Diagnoses: Chronic left-sided low back pain with left-sided sciatica      triamcinolone (KENALOG) 0.1 % external cream Apply topically 2 times daily as needed      triamterene-HCTZ (DYAZIDE) 37.5-25 MG capsule Take 1 capsule by mouth every morning Hold for SBP<110      vitamin D3 (CHOLECALCIFEROL) 50 mcg (2000 units) tablet Take 50 mcg by mouth daily      blood glucose (ACCU-CHEK FASTCLIX) lancing device FOR TESTING ONCE DAILY. DX  E11.9 TYPE 2 DIABETES      !! blood glucose (CONTOUR TEST) test strip TESTING EVERY DAY DX  E11.9      !! CONTOUR NEXT TEST test strip TESTING EVERY DAY DX  E11.9      order for DME Equipment being ordered: wrist brace  Qty: 1 Device, Refills: 0    Associated Diagnoses: Tenosynovitis, de Quervain       !! - Potential duplicate medications found. Please discuss with provider.      STOP taking these medications       lactulose (CEPHULAC) 20 GM packet Comments:   Reason for Stopping:         NONFORMULARY Comments:   Reason for Stopping:             Allergies   Allergies   Allergen  Reactions     Propofol Nausea and Vomiting     Shellfish Allergy      Other reaction(s): Dizziness     Capsaicin Rash and Blisters

## 2023-04-01 NOTE — PROGRESS NOTES
PRIMARY DIAGNOSIS: severe constipation   OUTPATIENT/OBSERVATION GOALS TO BE MET BEFORE DISCHARGE:  1. ADLs back to baseline: No    2. Activity and level of assistance: Ax2 w/ andrey steady.     3. Pain status: Improved-controlled with oral pain medications.    4. Return to near baseline physical activity: No     Discharge Planner Nurse   Safe discharge environment identified: No  Barriers to discharge: Yes       Entered by: Haylee Lyn RN 03/31/2023     Please review provider order for any additional goals.   Nurse to notify provider when observation goals have been met and patient is ready for discharge.

## 2023-04-03 ENCOUNTER — TRANSITIONAL CARE UNIT VISIT (OUTPATIENT)
Dept: GERIATRICS | Facility: CLINIC | Age: 83
End: 2023-04-03
Payer: MEDICARE

## 2023-04-03 ENCOUNTER — LAB REQUISITION (OUTPATIENT)
Dept: LAB | Facility: CLINIC | Age: 83
End: 2023-04-03
Payer: MEDICARE

## 2023-04-03 VITALS
HEIGHT: 65 IN | WEIGHT: 192 LBS | RESPIRATION RATE: 18 BRPM | DIASTOLIC BLOOD PRESSURE: 78 MMHG | SYSTOLIC BLOOD PRESSURE: 151 MMHG | OXYGEN SATURATION: 96 % | BODY MASS INDEX: 31.99 KG/M2 | HEART RATE: 77 BPM | TEMPERATURE: 97.3 F

## 2023-04-03 DIAGNOSIS — N32.81 OVERACTIVE BLADDER: ICD-10-CM

## 2023-04-03 DIAGNOSIS — D62 ANEMIA DUE TO BLOOD LOSS, ACUTE: ICD-10-CM

## 2023-04-03 DIAGNOSIS — G89.29 OTHER CHRONIC PAIN: ICD-10-CM

## 2023-04-03 DIAGNOSIS — J44.9 CHRONIC OBSTRUCTIVE PULMONARY DISEASE, UNSPECIFIED COPD TYPE (H): ICD-10-CM

## 2023-04-03 DIAGNOSIS — K21.9 GASTROESOPHAGEAL REFLUX DISEASE, UNSPECIFIED WHETHER ESOPHAGITIS PRESENT: ICD-10-CM

## 2023-04-03 DIAGNOSIS — Z98.890 S/P CARPAL TUNNEL RELEASE: Primary | ICD-10-CM

## 2023-04-03 DIAGNOSIS — J45.20 MILD INTERMITTENT ASTHMA WITHOUT COMPLICATION: ICD-10-CM

## 2023-04-03 DIAGNOSIS — E11.21 TYPE 2 DIABETES MELLITUS WITH DIABETIC NEPHROPATHY, WITHOUT LONG-TERM CURRENT USE OF INSULIN (H): ICD-10-CM

## 2023-04-03 DIAGNOSIS — I10 ESSENTIAL HYPERTENSION: ICD-10-CM

## 2023-04-03 DIAGNOSIS — G89.4 CHRONIC PAIN SYNDROME: ICD-10-CM

## 2023-04-03 DIAGNOSIS — L29.9 ITCHING: ICD-10-CM

## 2023-04-03 DIAGNOSIS — I89.0 LYMPHEDEMA: ICD-10-CM

## 2023-04-03 DIAGNOSIS — F32.A DEPRESSION, UNSPECIFIED DEPRESSION TYPE: ICD-10-CM

## 2023-04-03 DIAGNOSIS — K58.1 IRRITABLE BOWEL SYNDROME WITH CONSTIPATION: ICD-10-CM

## 2023-04-03 DIAGNOSIS — M48.00 SPINAL STENOSIS, UNSPECIFIED SPINAL REGION: ICD-10-CM

## 2023-04-03 DIAGNOSIS — G56.02 CARPAL TUNNEL SYNDROME OF LEFT WRIST: ICD-10-CM

## 2023-04-03 DIAGNOSIS — D64.9 CHRONIC ANEMIA: ICD-10-CM

## 2023-04-03 DIAGNOSIS — K59.01 SLOW TRANSIT CONSTIPATION: ICD-10-CM

## 2023-04-03 DIAGNOSIS — F41.9 ANXIETY: ICD-10-CM

## 2023-04-03 DIAGNOSIS — F31.9 BIPOLAR 1 DISORDER (H): ICD-10-CM

## 2023-04-03 DIAGNOSIS — R53.81 PHYSICAL DECONDITIONING: ICD-10-CM

## 2023-04-03 DIAGNOSIS — U07.1 COVID-19: ICD-10-CM

## 2023-04-03 PROBLEM — H81.09 MENIERE'S DISEASE: Status: ACTIVE | Noted: 2023-04-03

## 2023-04-03 PROCEDURE — 99309 SBSQ NF CARE MODERATE MDM 30: CPT | Performed by: NURSE PRACTITIONER

## 2023-04-03 PROCEDURE — U0003 INFECTIOUS AGENT DETECTION BY NUCLEIC ACID (DNA OR RNA); SEVERE ACUTE RESPIRATORY SYNDROME CORONAVIRUS 2 (SARS-COV-2) (CORONAVIRUS DISEASE [COVID-19]), AMPLIFIED PROBE TECHNIQUE, MAKING USE OF HIGH THROUGHPUT TECHNOLOGIES AS DESCRIBED BY CMS-2020-01-R: HCPCS | Performed by: NURSE PRACTITIONER

## 2023-04-03 RX ORDER — LACTULOSE 10 G/15ML
10 SOLUTION ORAL EVERY 8 HOURS PRN
Start: 2023-04-03 | End: 2024-01-28

## 2023-04-03 RX ORDER — POLYETHYLENE GLYCOL 3350 17 G/17G
17 POWDER, FOR SOLUTION ORAL 2 TIMES DAILY PRN
Qty: 510 G
Start: 2023-04-03 | End: 2024-01-28

## 2023-04-03 RX ORDER — PREGABALIN 50 MG/1
50 CAPSULE ORAL 2 TIMES DAILY
Qty: 30 CAPSULE | Refills: 0 | Status: SHIPPED | OUTPATIENT
Start: 2023-04-03 | End: 2024-01-28

## 2023-04-03 RX ORDER — OXYCODONE HYDROCHLORIDE 5 MG/1
5-10 TABLET ORAL
Qty: 20 TABLET | Refills: 0 | Status: SHIPPED | OUTPATIENT
Start: 2023-04-03 | End: 2023-04-07

## 2023-04-03 NOTE — PROGRESS NOTES
[facility]    PRIMARY CARE PROVIDER AND CLINIC:  Ananya Mclean NP, [place] [facility] [address] [facility]  / [facility]*  Chief Complaint   Patient presents with     Hospital F/U      [place] Medical Record Number:  [id number]  Place of Service where encounter took place:  [facility]  ([facility]) [[id number]]    Jumana Yang  is a 82 year old  (1940), admitted to the above facility from  [facility]. Hospital stay 3/30/23 through 4/1/23..   HPI:    PMH significant for HTN, lymphedema, type 2 DM, COPD, asthma, chronic pain with history of severe spinal stenosis (follows with pain clinic), anxiety, depression, bipolar disease, GERD, hard of hearing, meniere disease, overactive bladder, irritable bowel syndrome.     Summary of recent hospitalizations:  Patient was admitted at [facility] from 3/20/2023 to 3/27/2023 for elective left open carpal tunnel release and left ring finger A1 pulley release. Hemoglobin dropped secondary to surgery down to 9.0 on 3/23/2023. Hospitalization course uncomplicated. Discharged to TCU for physical rehabilitation and medical management.      Most recently, patient was admitted at [facility] for constipation. Xray abdomen shows large stool throughout the colon, no evidence for small bowel obstruction, no free air at decubitus imaging.  Manual disimpaction in the emergency department was unsuccessful. Bowel regimen adjusted and started to have bowel movements inpatient. Discharged to TCU for physical rehabilitation and medical management.     Reviewed recent hospitalization H&P, daily notes, discharge summary today    Patient was seen today for readmission visit in the TCU.  And is very hard of hearing, so a communication board and a mask with a clear opening was used so that she is able to read my lips today.  She is tearful today, reporting that she is having frequent soft/ dirrhea bowel movements and  would like her bowel medications changed to as needed.  She denies abdominal pain.  She denies shortness of breath, chest pain, dizziness/lightheadedness.  She tells me she is having significant left wrist pain and that she just took some pain medications.  She also reports chronic back pain with severe spinal stenosis. She finds the bed is not hard enough and is contributing to her pain.  She will continue to work with therapy in the TCU.    Reviewed facility EMR including medications, recent nursing progress notes, vital signs.  Discussed patient with nursing staff including plan of care as below.    CODE STATUS/ADVANCE DIRECTIVES DISCUSSION:   CPR/Full code   ALLERGIES:   Allergies   Allergen Reactions     Propofol Nausea and Vomiting     Shellfish Allergy      Other reaction(s): Dizziness     Capsaicin Rash and Blisters      PAST MEDICAL HISTORY:   Past Medical History:   Diagnosis Date     Abdominal pain      Anxiety      Arthritis      Asthma      Bipolar 1 disorder (H)      Chronic pain      Cochlear hydrops 01/01/1988    steriods and diazide     COPD (chronic obstructive pulmonary disease) (H)      Depression      Diabetes mellitus (H)      Dyspepsia      GERD (gastroesophageal reflux disease)      Hard of hearing     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     Hepatic abscess      Hyperlipidemia      Hypertension      Hypothyroidism      Irritable bowel syndrome      Meniere disease      Neuropathy      Noninfectious ileitis      Peritoneal abscess (H)      Spinal stenosis of lumbar region      Steroid long-term use      Vaginitis, atrophic, postmenopausal      Vitamin D deficiency       PAST SURGICAL HISTORY:   has a past surgical history that includes Rectal surgery; Foot surgery; picc insertion (8/28/2013); GI surgery; orthopedic surgery; vascular surgery; Laparoscopic hepatectomy partial (11/4/2013); cataract iol, rt/lt (Left, 7/2013); IR Lumbar Sacral Transfor Injection Bilateral (Bilateral,  3/11/2022); and Release carpal tunnel (Left, 3/21/2023).  FAMILY HISTORY: family history includes Cerebrovascular Disease in her mother; Heart Disease in her brother, father, and mother.  SOCIAL HISTORY:   reports that she quit smoking about 35 years ago. Her smoking use included cigarettes. She has quit using smokeless tobacco. She reports that she does not currently use alcohol. She reports that she does not use drugs.  Patient's living condition: lives alone    Post Discharge Medication Reconciliation Status:   MED REC REQUIRED{  Post Medication Reconciliation Status:  Discharge medications reconciled and changed, see notes/orders         Current Outpatient Medications   Medication Sig     acetaminophen (TYLENOL) 325 MG tablet Take 2 tablets (650 mg) by mouth every 4 hours as needed for other (For optimal non-opioid multimodal pain management to improve pain control.)     busPIRone (BUSPAR) 7.5 MG tablet Take 7.5 mg by mouth 3 times daily     calamine 8-8 % lotion Apply topically every hour as needed for itching     clobetasol (TEMOVATE) 0.05 % external cream Apply topically 2 times daily     cyanocobalamin 1000 MCG SUBL Place 1,000 mcg under the tongue daily     diazepam (VALIUM) 2 MG tablet Take 1 tablet (2 mg) by mouth every 6 hours as needed for anxiety     DULoxetine (CYMBALTA) 60 MG capsule Take 60 mg by mouth every morning     glipiZIDE (GLUCOTROL) 5 MG tablet Take 1 tablet (5 mg) by mouth 2 times daily (before meals)     hydrOXYzine (ATARAX) 25 MG tablet Take 25 mg by mouth 3 times daily as needed for anxiety     JARDIANCE 25 MG TABS tablet Take 25 mg by mouth every morning     lactulose (CHRONULAC) 10 GM/15ML solution Take 15 mLs (10 g) by mouth every 8 hours as needed for constipation     lamoTRIgine (LAMICTAL) 100 MG tablet Take 100 mg by mouth 2 times daily     metFORMIN (GLUCOPHAGE) 500 MG tablet Take 500 mg by mouth 2 times daily (with meals)     methocarbamol (ROBAXIN) 500 MG tablet Take 1 tablet  (500 mg) by mouth 3 times daily as needed for muscle spasms     miconazole (MICATIN) 2 % external powder Apply topically 2 times daily as needed To the skin fold     naloxone (NARCAN) 4 MG/0.1ML nasal spray Spray 1 spray (4 mg) into one nostril alternating nostrils as needed for opioid reversal every 2-3 minutes until assistance arrives     omeprazole (PRILOSEC) 20 MG DR capsule Take 20 mg by mouth daily     order for DME Equipment being ordered: wrist brace     oxybutynin ER (DITROPAN XL) 15 MG 24 hr tablet Take 15 mg by mouth every morning     oxyCODONE (ROXICODONE) 5 MG tablet Take 1-2 tablets (5-10 mg) by mouth every 3 hours as needed for moderate pain (4-6) 1 if pain rated 1-5 & 2 if 6-10.     polyethylene glycol (MIRALAX) 17 GM/Dose powder Take 17 g by mouth 2 times daily as needed for constipation     predniSONE (DELTASONE) 1 MG tablet Take 1 mg by mouth every morning (In addition to the 5 mg dose; 1 mg + 5 mg = 6 mg)     predniSONE (DELTASONE) 5 MG tablet Take 5 mg by mouth every morning (In addition to the 1 mg dose; 5 mg + 1 mg = 6 mg)     pregabalin (LYRICA) 50 MG capsule Take 1 capsule (50 mg) by mouth 2 times daily     senna-docusate (SENOKOT-S/PERICOLACE) 8.6-50 MG tablet Take 1 tablet by mouth 2 times daily     triamcinolone (KENALOG) 0.1 % external cream Apply topically 2 times daily as needed     triamterene-HCTZ (DYAZIDE) 37.5-25 MG capsule Take 1 capsule by mouth every morning Hold for SBP<110     vitamin D3 (CHOLECALCIFEROL) 50 mcg (2000 units) tablet Take 50 mcg by mouth daily     blood glucose (ACCU-CHEK FASTCLIX) lancing device FOR TESTING ONCE DAILY. DX  E11.9 TYPE 2 DIABETES     blood glucose (CONTOUR TEST) test strip TESTING EVERY DAY DX  E11.9     CONTOUR NEXT TEST test strip TESTING EVERY DAY DX  E11.9     ondansetron (ZOFRAN ODT) 4 MG ODT tab Take 1 tablet (4 mg) by mouth every 6 hours as needed for nausea or vomiting     No current facility-administered medications for this visit.  "      ROS:  10 point ROS of systems including Constitutional, Eyes, Respiratory, Cardiovascular, Gastroenterology, Genitourinary, Integumentary, Musculoskeletal, Psychiatric were all negative except for pertinent positives noted in my HPI.    Vitals:  BP (!) 151/78   Pulse 77   Temp 97.3  F (36.3  C)   Resp 18   Ht 1.651 m (5' 5\")   Wt 87.1 kg (192 lb)   LMP  (LMP Unknown)   SpO2 96%   BMI 31.95 kg/m    Exam:  GENERAL APPEARANCE:  Alert, in NAD  HEENT: normocephalic, moist mucous membranes, nose without drainage or crusting, very [place]  RESP:  respiratory effort normal, no respiratory distress, Lung sounds clear, patient is on RA  CV: auscultation of heart done, rate and rhythm regular.   ABDOMEN: + bowel sounds, soft, nontender, no grimacing or guarding with palpation.  M/S: no lower extremity edema; ACE wrap in place to left wrist  NEURO: cranial nerves 2-12 grossly intact and at patient's baseline; moves extremities freely  PSYCH: affect and mood tearful, anxious      Lab/Diagnostic data:  Labs done in SNF are in [facility]. Please refer to them using [facility] Everywhere. and Recent labs in [facility] reviewed by me today.     ASSESSMENT/PLAN:  S/p Left open carpal tunnel release and left ring finger A1 pulley release on 3/21/2023  -reports severe pain at time of visit  -using oxycodone on average once daily  -has not used methocarbamol recently  Plan: Continue current pain regimen with Tylenol 650 mg every 4 hours as needed, methocarbamol 500 mg 3 times daily as needed, oxycodone 5 to 10 mg every 3 hours as needed. Narcan PRN. No pushing, pulling, lifting, torquing more than 2 pounds with left hand.  Typing writing and light activities with the fingers is okay.  Patient should work on making a full fist and gentle finger range of motion with surgical hand.  Do not use left hand and transferring.  Follow-up with surgeon 4/4.     Acute blood loss anemia  Chronic anemia  -with acute drop secondary to " surgery  -baseline hemoglobin 10  -hemoglobin 9.8 on 3/30/3023, almost back to baseline  Plan: CBC PRN     Irritable bowel syndrome  Slow transit constipation  -suspected to be secondary to lack of mobility and recent surgery  -bowels moving too well now and causing patient to be distressed  Plan: Change lactulose to 10 g every 8 hours as needed, MiraLAX 17 g twice daily as needed, Senna S1 tab twice daily as needed. Monitor bowels closely- nursing to ensure patient has BM daily.     Essential hypertension  Lymphedema  -Blood pressure controlled  -Reports chronic lymphedema- has wraps in place today  Plan: Continue triamterene-HCTZ 37.5-25 mg, take 1 tab daily. Continue compression wraps on in a.m. off in p.m. VS per policy. Adjust medications as needed.     Type 2 diabetes  -A1c 6.9% on 3/21/2023  -PA glipizide was reduced on admission to TCU due to low BS  --301  Plan: Conitnue glipizide 5 mg BID. Continue Jardiance 25 mg daily, metformin 500 mg twice daily. Carb controlled diet. Continue BS monitoring to TID. Update provider if BS regularly elevated above 200.     COPD  Asthma  -Respiratory status stable  Plan: Not currently on medical management.  Monitor respiratory status.     Itching  Plan: Continue hydroxyzine 25 mg TID PRN and calamine lotion. Can consider adding zyrtec daily PRN if ongoing symptoms.     GERD  Plan: Continue omeprazole 20 mg daily.     Chronic pain with history of severe spinal stenosis   -follows with pain clinic and PTA regimen is methocarbamol 500 mg 3 times daily as needed, Percocet 1 tablet every 6 hours as needed, pregabalin 50 mg twice daily, duloxetine 60 mg daily, prednisone 6 mg daily  -has not used methocarbamol since admission  -using oxycodone on average once daily  -reports severe back pain today  Plan: Discussed patient concern with bed contributing to her pain with nursing. Nursing will assist patient with getting more comfortable bed.Continue Tylenol 650 mg every 4  hours as needed, duloxetine 60 mg daily, methocarbamol 500 mg 3 times daily as needed, oxycodone 5 to 10 mg every 3 hours as needed, prednisone 6 mg daily, and pregabalin 50 mg twice daily. Monitor pain. Follow up with pain clinic per recommendations.      Anxiety  Depression  Bipolar 1 disorder  Chronic, anxious today  Plan: Continue BuSpar 7.5 mg 3 times daily, diazepam 2 mg every 6 hours as needed, duloxetine 60 mg daily, hydroxyzine 25 mg 3 times daily as needed, lamotrigine 100 mg twice daily. Monitor mood and symptoms. Consider ACP referral PRN. Noted initial PRN orders for non antipsychotic psychotropic medications are limited to 14 days. Start new psychotropic medication diazepam days for sporadic anxiety. Nsg to update provider 5 days before order expiration.      Overactive bladder  Plan: Continue oxybutynin ER 15 mg daily. Monitor symptoms     Physical deconditioning  Comment: Acute, secondary to surgery, medical conditions as above  Plan: Encourage participation in physical therapy/occupational therapy for strengthening and deconditioning. Discharge planning per their recommendation. Social work to assist with d/c planning.         Disclaimer: This note may contain text created using speech-recognition software and may contain unintended word substitutions.       Electronically signed by:  JUAN ANTONIO Rose CNP

## 2023-04-03 NOTE — PATIENT INSTRUCTIONS
Orders  Jumana Yang  1940  1) Change lactulose to 10 g every 8 hours as needed for constipation  2) Change MiraLAX 17 g twice daily as needed for constipation  3) change Senna S1 tab twice daily as needed for constipation  4) Monitor bowels closely- nursing to ensure patient has BM daily.  5) discontinue order for TG Shaper and change compression wraps on in AM, off in PM  6) Update provider if BS regularly elevated above 200.  JUAN ANTONIO Rose CNP on 4/3/2023 at 12:22 PM

## 2023-04-04 LAB — SARS-COV-2 RNA RESP QL NAA+PROBE: NEGATIVE

## 2023-04-04 NOTE — OP NOTE
PREOPERATIVE DIAGNOSIS:    1.  Left carpal tunnel syndrome.  2.  Left ring trigger finger.     POSTOPERATIVE DIAGNOSIS:    1.  Left carpal tunnel syndrome.   2.  Left ring trigger finger.     PROCEDURE:    1.  Left open carpal tunnel release.   2.  Left ring finger A1 pulley release.     ATTENDING SURGEON:  Claire Hamilton MD      ASSISTANT:  Diamond Fuentes PA-C     ANESTHESIA:  Monitored anesthetic care and local anesthesia consisting of 7 mL of 0.5% Marcaine plain and 1% lidocaine plain in a 1:1 ratio.      TOURNIQUET TIME:  11 minutes.      ESTIMATED BLOOD LOSS:  2 mL.      SPECIMENS:  None.      DRAINS:  None.      IMPLANTS:  None.      COMPLICATIONS:  None apparent.      BRIEF HISTORY:  Jumana Yang is an 82-year-old right-hand dominant female who presented with a history of numbness and tingling in a median nerve distribution.  She was having severe pain and associated nighttime symptoms.  Additionally, she had developed triggering of the left ring finger.  She has undergone a trial of splinting and anti-inflammatories, but has had persistence of symptoms.  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  I had a discussion with the patient regarding open carpal tunnel release and left ring finger A1 pulley release.  I described the procedures, post-operative protocol and the expected outcomes.  We discussed the risks, benefits and alternatives to surgery.  Risks discussed include bleeding, infection, nerve or vessel damage, wound healing problems, persistent pain, persistent numbness, recurrent triggering, finger stiffness, and the possibility for further surgery.  Anesthetic risks are rare, but include breathing problems, heart problems, stroke, and death.  After a full discussion of risks, benefits, and alternatives to surgery, the patient elected to proceed and informed consent was obtained.    INTRAOPERATIVE FINDINGS: There was no hook  of hamate fractures or retained lumbricals.  The median nerve was hyperemic.  The ligament was thickened most notably at the distal aspect.  There was tenosynovitis of the flexor tendons at the level of the A1 pulley.     DESCRIPTION OF PROCEDURE:  The patient was identified in the preoperative holding area.  The consent was reviewed and signed and the operative sites were identified and marked.  The patient was brought to the operating room and transferred to the operating table in a supine position.  A tourniquet was applied to the operative forearm and the arm was placed onto an arm table.  She was given sedation.  The left arm was prepped and draped in a standard, sterile fashion.  A timeout was performed, verifying the correct patient, procedure, site and side.  Preoperative, prophylactic antibiotics were given.  The skin and subcutaneous tissue were injected with local anesthesia in line with the planned surgical incision.  An Esmarch was used to exsanguinate the limb and the tourniquet was inflated to 250 mmHg. A longitudinal incision was made in line with the radial aspect of the ring finger from the distal wrist flexion crease to Jose's line.  This was taken down sharply to the level of the palmar fascia.  The palmar fascia was incised in line with the skin incision.  Hemostasis was achieved.  The transverse fibers of the transverse carpal ligament were identified and released from their ulnar attachment in both a proximal and distal direction.  Distally, the release was completed until the palmar fat was present and there was greater than 1 cm diastasis of the flaps.  Proximally, the tenotomy scissors were used to bluntly dissect above and below the transverse carpal ligament and it was released under direct visualization into the distal forearm.  Palpation and visualization confirmed complete release of the ligament.  The carpal tunnel was then inspected with findings as listed above.      A  longitudinal incision was made in line with the ring finger at the level of the A1 pulley.  The incision was taken through skin only.  Blunt dissection was performed down to the flexor tendon sheath.  Regnell retractors were placed on either side of the flexor tendon sheath to protect the neurovascular bundles throughout the procedure.  All soft tissue overlying the flexor tendon sheath was gently freed and retracted.  The A1 pulley was identified and was longitudinally incised.  The release was completed distal with the use of tenotomy scissors to the level of the A2 pulley.  Proximally, the tenotomy scissors were used to release the pre-fascia.  The flexor tendons were delivered out of the flexor tendon sheath with the Ragnell retractor.  A limited tenosynovectomy was performed.  There was no catching or locking of the digit with passive range of motion.  The tourniquet was deflated.  The wounds were thoroughly irrigated with normal saline.  Hemostasis was achieved.  The skin incisions were closed with interrupted 4-0 nylon.  A soft, sterile dressing was applied followed by a well-padded volar resting splint.  The patient tolerated the procedure well and there were no immediate complications.  She was awakened and brought to the recovery room in stable condition.  All sponge and needle counts were correct at the end of the case.      POSTOPERATIVE PLAN: The patient will be admitted for pain management and therapy evaluation.  She is wheelchair dependent and lives alone, and she will be unable to care for herself in the early postoperative period.  She will require TCU admission in the next 1 to 2 days.  Her dressing should remain clean and dry until her follow-up appointment.  She will return to my clinic 10 to 12 days after surgery for suture removal.        KEIKO STEINER MD

## 2023-04-04 NOTE — DISCHARGE SUMMARY
[facility] Orthopaedic Discharge Summary    Jumana Yang MRN# [id number]   Age: 82 year old YOB: 1940     Date of Admission:  3/21/2023  Date of Discharge::  3/27/2023  Admitting Physician:  Claire Hamilton MD  Discharge Physician:  Claire Hamilton MD             Admission Diagnoses:   Carpal tunnel syndrome of left wrist [G56.02]  Left ring trigger finger.          Discharge Diagnosis:   Carpal tunnel syndrome of left wrist [G56.02]  Left ring trigger finger.  Chronic Pain  Severe spinal stenosis  Neuropathy  Bipolar 1 disorder  Diabetes mellitus          Procedures:   Procedure(s): Left open carpal tunnel release and left ring finger A1 pulley release on 3/21/23.             Consultations:   1. Internal medicine hospitalist  2. Occupational Therapy  3. Physical Therapy  4. Pain Service  5.           Brief History of Illness:   This patient was an 82 year old female with a known history of bilateral carpal tunnel syndrome in the setting of chronic pain and neuropathy.  She underwent a period of non-operative treatment, but had persistence of her symptoms. She has elected to proceed with open carpal tunnel release and ring finger A1 pulley release.  She was explained the risks,complications, and benefits of the procedure and elected to have the surgical procedure.             Hospital Course:   The procedure went without complication.  The patient was taken to the recovery room in stable condition.  After a short PACU stay, she was admitted for pain management and therapy evaluation.      Due to significant pain and narcotic pain medication requirements, a pain consult was obtained.  She was also seen by the hospitalist service for management of her co-morbidities.  She was evaluated by both occupational and physical therapy and deemed appropriate for TCU transfer.  She is unable to care for herself at home (lives alone).    The patient was deemed appropriate for discharge  to TCU on post-operative day #6.  At that time she had good pain control on oral pain medications, was tolerating a regular diet, voiding, and had return of bowel function.         Discharge Physical Examination:   General: Afebrile, vital signs stable.  Alert and oriented times three and in no acute distress.  Respiratory: Regular and non-labored.  LUE:  Dressing is clean, dry, and intact.  Intact finger abduction, EPL, FPL.  Fingers are warm and well-perfused.         Discharge Medications:     Discharge Medication List as of 3/27/2023 12:55 PM      START taking these medications    Details   acetaminophen (TYLENOL) 325 MG tablet Take 2 tablets (650 mg) by mouth every 4 hours as needed for other (For optimal non-opioid multimodal pain management to improve pain control.), Disp-100 tablet, R-0, Transitional      diphenhydrAMINE (BENADRYL) 12.5 MG/5ML solution Take 5 mLs (12.5 mg) by mouth every 4 hours as needed for itching, Transitional         CONTINUE these medications which have CHANGED    Details   diazepam (VALIUM) 2 MG tablet Take 1 tablet (2 mg) by mouth every 6 hours as needed for anxiety, Disp-10 tablet, R-0, Local Print      oxyCODONE (ROXICODONE) 5 MG tablet Take 1-2 tablets (5-10 mg) by mouth every 3 hours as needed for moderate pain (4-6) 1 if pain rated 1-5 & 2 if 6-10., Disp-20 tablet, R-0, Local Print      pregabalin (LYRICA) 50 MG capsule Take 1 capsule (50 mg) by mouth 2 times daily, Disp-10 capsule, R-0, Local Print         CONTINUE these medications which have NOT CHANGED    Details   blood glucose (ACCU-CHEK FASTCLIX) lancing device FOR TESTING ONCE DAILY. DX  E11.9 TYPE 2 DIABETES, Historical      !! blood glucose (CONTOUR TEST) test strip TESTING EVERY DAY DX  E11.9, Historical      busPIRone (BUSPAR) 7.5 MG tablet Take 7.5 mg by mouth 3 times daily, Historical      calamine 8-8 % lotion Apply topically every hour as needed for itchingDisp-118 mL, [id number]      clobetasol (TEMOVATE)  0.05 % external cream Apply topically 2 times dailyHistorical      !! CONTOUR NEXT TEST test strip TESTING EVERY DAY DX  E11.9, JAVED, Historical      cyanocobalamin 1000 MCG SUBL Place 1,000 mcg under the tongue daily, Historical      DULoxetine (CYMBALTA) 60 MG capsule Take 60 mg by mouth every morning, Historical      hydrOXYzine (ATARAX) 25 MG tablet Take 25 mg by mouth 3 times daily as needed for anxiety, Historical      JARDIANCE 25 MG TABS tablet Take 25 mg by mouth every morning, JAVED, Historical      lamoTRIgine (LAMICTAL) 100 MG tablet Take 100 mg by mouth 2 times daily, Historical      metFORMIN (GLUCOPHAGE) 500 MG tablet Take 500 mg by mouth 2 times daily (with meals), Historical      methocarbamol (ROBAXIN) 500 MG tablet Take 1 tablet (500 mg) by mouth 3 times daily as needed for muscle spasms, Disp-60 tablet, R-0, E-Prescribe      miconazole (MICATIN) 2 % external powder Apply topically 2 times daily as needed To the skin foldHistorical      omeprazole (PRILOSEC) 20 MG DR capsule Take 20 mg by mouth daily, Historical      order for DME Equipment being ordered: wrist braceDisp-1 Device, R-0, Local Print      oxybutynin ER (DITROPAN XL) 15 MG 24 hr tablet Take 15 mg by mouth every morning, Historical      !! predniSONE (DELTASONE) 1 MG tablet Take 1 mg by mouth every morning (In addition to the 5 mg dose; 1 mg + 5 mg = 6 mg), Historical      !! predniSONE (DELTASONE) 5 MG tablet Take 5 mg by mouth every morning (In addition to the 1 mg dose; 5 mg + 1 mg = 6 mg), Historical      senna-docusate (SENOKOT-S/PERICOLACE) 8.6-50 MG tablet Take 1 tablet by mouth 2 times daily, Disp-30 tablet, R-0, E-Prescribe      triamcinolone (KENALOG) 0.1 % external cream Apply topically 2 times daily as neededHistorical      triamterene-HCTZ (DYAZIDE) 37.5-25 MG capsule Take by mouth every morning, Historical      vitamin D3 (CHOLECALCIFEROL) 50 mcg (2000 units) tablet Take 3 tablets by mouth daily, Historical      glipiZIDE  (GLUCOTROL) 10 MG tablet Take 1 tablet (10 mg) by mouth 2 times daily (before meals), No Print Out      NONFORMULARY Take 1 tablet by mouth every morning MEDICATION NAME: Buffered Berberine 350 mg, Historical      polyethylene glycol (MIRALAX) 17 g packet Take 1 packet by mouth daily as needed for constipation, Historical       !! - Potential duplicate medications found. Please discuss with provider.      STOP taking these medications       DIAZEPAM 5 MG/5ML solution Comments:   Reason for Stopping:         diphenoxylate-atropine (LOMOTIL) 2.5-0.025 MG tablet Comments:   Reason for Stopping:         oxyCODONE-acetaminophen (PERCOCET) 5-325 MG tablet Comments:   Reason for Stopping:                  Discharge Instructions and Follow-Up:   Discharge diet: Diabetic (1800 ADA)   Discharge activity: Activity as tolerated  No lifting more than 2 pounds for the next 2 weeks   Discharge follow-up: April 4, 2023 in [place] at [facility]   Wound care: Ice to area for comfort  May get incision wet in shower but do not soak or scrub  Cover incision with band-aid or light dressing.           Discharge Disposition:   [facility]      Claire Hamilton MD

## 2023-04-05 VITALS
DIASTOLIC BLOOD PRESSURE: 67 MMHG | BODY MASS INDEX: 32.99 KG/M2 | OXYGEN SATURATION: 94 % | WEIGHT: 198 LBS | RESPIRATION RATE: 18 BRPM | TEMPERATURE: 98 F | SYSTOLIC BLOOD PRESSURE: 107 MMHG | HEIGHT: 65 IN | HEART RATE: 81 BPM

## 2023-04-05 NOTE — PROGRESS NOTES
[place] [facility]  INITIAL VISIT NOTE  April 6, 2023    PRIMARY CARE PROVIDER AND CLINIC:  Ananya Mclean [place] [facility] [address]  / [facility]    CHIEF COMPLAINT:  Hospital follow-up/Initial visit    HPI:    Jumana Yang is a 82 year old  (1940) female [facility] for an initial [facility].     Medical history is notable for hypertension, dyslipidemia, DM type II, neuropathy, COPD, asthma, anemia, GERD, IBS, OAB, peritoneal/hepatic abscess, Ménière's disease, depression, anxiety, bipolar 1 disorder, vitamin D deficiency, spinal stenosis, osteoarthritis, obesity, and recent hospitalization from March 21 through March 27, 2023 for left carpal tunnel and left ring trigger finger release.    Summary of hospital course:  Patient was hospitalized at [facility] from March 30 through April 1, 2023 for acute on chronic constipation that was felt multifactorial due to slow transit constipation as well as opiate induced.  Abdominal x-ray revealed large stool throughout the colon but no evidence for small bowel obstruction or free air.  Manual disimpaction attempted in ED without success.  She received bisacodyl suppository and treated with MiraLAX and lactulose with good response.  TCU was recommended per therapies.    Patient is admitted to this facility for medical management, nursing care, and rehab.     Of note, history was obtained from patient, facility RN, and extensive review of the chart.    Today's visit:  Patient was seen in her room, while sitting in a wheelchair.  She is hard of hearing.  She is comfortable and cheerful.  She states that she has not had a bowel movement for the past 4-5 days although she does not feel uncomfortable.  Earlier today she took prune juice along with other bowel regimen.  She denies fever, chills, chest pain, palpitation, dyspnea, nausea, vomiting, abdominal pain, or urinary  symptoms.      CODE STATUS:   CPR/Full code     PAST MEDICAL HISTORY:   Hypertension  Dyslipidemia  DM type II  Peripheral neuropathy  COPD  Asthma  Chronic anemia, baseline hemoglobin 9-10  GERD  IBS with constipation  OAB  Peritoneal/hepatic abscess  Lower extremity edema  Ménière's disease  Depression  Anxiety  Bipolar 1 disorder  Vitamin D deficiency  Spinal stenosis of lumbar region  Osteoarthritis  Chronic pain syndrome  Left carpal tunnel syndrome and left ring trigger finger, s/p surgical release on March 21, 2023  Obesity, BMI 33    Past Medical History:   Diagnosis Date     Abdominal pain      Anxiety      Arthritis      Asthma      Bipolar 1 disorder (H)      Chronic pain      Cochlear hydrops 01/01/1988    steriods and diazide     COPD (chronic obstructive pulmonary disease) (H)      Depression      Diabetes mellitus (H)      Dyspepsia      GERD (gastroesophageal reflux disease)      Hard of hearing     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     Hepatic abscess      Hyperlipidemia      Hypertension      Hypothyroidism      Irritable bowel syndrome      Meniere disease      Neuropathy      Noninfectious ileitis      Peritoneal abscess (H)      Spinal stenosis of lumbar region      Steroid long-term use      Vaginitis, atrophic, postmenopausal      Vitamin D deficiency        PAST SURGICAL HISTORY:   Past Surgical History:   Procedure Laterality Date     CATARACT IOL, RT/LT Left 7/2013     FOOT SURGERY      toe     GI SURGERY       IR LUMBAR/SACRAL TRANSFOR INJ BILATERAL Bilateral 3/11/2022     LAPAROSCOPIC HEPATECTOMY PARTIAL  11/4/2013    Procedure: LAPAROSCOPIC HEPATECTOMY PARTIAL;  Laparoscopic Debridement of Liver Abcess;  Surgeon: Sepideh Green MD;  Location: [facility] OR     ORTHOPEDIC SURGERY       PICC INSERTION  8/28/2013    5fr DL Power PICC, 41cm (1cm external length) in the R lateral brachial vein with tip in the SVC RA junction.     RECTAL SURGERY      1970s     RELEASE CARPAL  TUNNEL Left 3/21/2023    Procedure: LEFT OPEN CARPAL TUNNEL RELEASE. LEFT RING FINGER A1 PULLEY RELEASE;  Surgeon: Claire Hamilton MD;  Location: [facility] OR     VASCULAR SURGERY         FAMILY HISTORY:   Family History   Problem Relation Age of Onset     Cerebrovascular Disease Mother      Heart Disease Mother      Heart Disease Father      Heart Disease Brother      Diabetes No family hx of      Coronary Artery Disease No family hx of      Hypertension No family hx of      Hyperlipidemia No family hx of      Breast Cancer No family hx of      Colon Cancer No family hx of      Prostate Cancer No family hx of      Other Cancer No family hx of      Depression No family hx of      Anxiety Disorder No family hx of      Mental Illness No family hx of      Substance Abuse No family hx of      Anesthesia Reaction No family hx of      Asthma No family hx of      Osteoporosis No family hx of      Genetic Disorder No family hx of      Thyroid Disease No family hx of      Obesity No family hx of      Unknown/Adopted No family hx of          SOCIAL HISTORY:  Social History     Tobacco Use     Smoking status: Former     Types: Cigarettes     Quit date: 11/15/1987     Years since quittin.4     Smokeless tobacco: Former   Vaping Use     Vaping status: Not on file   Substance Use Topics     Alcohol use: Not Currently     Alcohol/week: 0.0 standard drinks of alcohol     Comment: MALISSA white since        MEDICATIONS:  Current Outpatient Medications   Medication Sig Dispense Refill     acetaminophen (TYLENOL) 325 MG tablet Take 2 tablets (650 mg) by mouth every 4 hours as needed for other (For optimal non-opioid multimodal pain management to improve pain control.) 100 tablet 0     blood glucose (ACCU-CHEK FASTCLIX) lancing device FOR TESTING ONCE DAILY. DX  E11.9 TYPE 2 DIABETES       blood glucose (CONTOUR TEST) test strip TESTING EVERY DAY DX  E11.9       busPIRone (BUSPAR) 7.5 MG tablet Take 7.5 mg by mouth 3 times daily        calamine 8-8 % lotion Apply topically every hour as needed for itching 118 mL 0     clobetasol (TEMOVATE) 0.05 % external cream Apply topically 2 times daily       CONTOUR NEXT TEST test strip TESTING EVERY DAY DX  E11.9       cyanocobalamin 1000 MCG SUBL Place 1,000 mcg under the tongue daily       diazepam (VALIUM) 2 MG tablet Take 1 tablet (2 mg) by mouth every 6 hours as needed for anxiety 10 tablet 0     DULoxetine (CYMBALTA) 60 MG capsule Take 60 mg by mouth every morning       glipiZIDE (GLUCOTROL) 5 MG tablet Take 1 tablet (5 mg) by mouth 2 times daily (before meals)       hydrOXYzine (ATARAX) 25 MG tablet Take 25 mg by mouth 3 times daily as needed for anxiety       JARDIANCE 25 MG TABS tablet Take 25 mg by mouth every morning       lactulose (CHRONULAC) 10 GM/15ML solution Take 15 mLs (10 g) by mouth every 8 hours as needed for constipation       lamoTRIgine (LAMICTAL) 100 MG tablet Take 100 mg by mouth 2 times daily       metFORMIN (GLUCOPHAGE) 500 MG tablet Take 500 mg by mouth 2 times daily (with meals)       methocarbamol (ROBAXIN) 500 MG tablet Take 1 tablet (500 mg) by mouth 3 times daily as needed for muscle spasms 60 tablet 0     miconazole (MICATIN) 2 % external powder Apply topically 2 times daily as needed To the skin fold       naloxone (NARCAN) 4 MG/0.1ML nasal spray Spray 1 spray (4 mg) into one nostril alternating nostrils as needed for opioid reversal every 2-3 minutes until assistance arrives 0.2 mL      omeprazole (PRILOSEC) 20 MG DR capsule Take 20 mg by mouth daily       ondansetron (ZOFRAN ODT) 4 MG ODT tab Take 1 tablet (4 mg) by mouth every 6 hours as needed for nausea or vomiting       order for DME Equipment being ordered: wrist brace 1 Device 0     oxybutynin ER (DITROPAN XL) 15 MG 24 hr tablet Take 15 mg by mouth every morning       oxyCODONE (ROXICODONE) 5 MG tablet Take 1-2 tablets (5-10 mg) by mouth every 3 hours as needed for moderate pain 1 if pain rated 1-5 & 2 if  "6-10. 20 tablet 0     polyethylene glycol (MIRALAX) 17 GM/Dose powder Take 17 g by mouth 2 times daily as needed for constipation 510 g PRN     predniSONE (DELTASONE) 1 MG tablet Take 1 mg by mouth every morning (In addition to the 5 mg dose; 1 mg + 5 mg = 6 mg)       predniSONE (DELTASONE) 5 MG tablet Take 5 mg by mouth every morning (In addition to the 1 mg dose; 5 mg + 1 mg = 6 mg)       pregabalin (LYRICA) 50 MG capsule Take 1 capsule (50 mg) by mouth 2 times daily 30 capsule 0     senna-docusate (SENOKOT-S/PERICOLACE) 8.6-50 MG tablet Take 1 tablet by mouth 2 times daily 30 tablet 0     triamcinolone (KENALOG) 0.1 % external cream Apply topically 2 times daily as needed       triamterene-HCTZ (DYAZIDE) 37.5-25 MG capsule Take 1 capsule by mouth every morning Hold for SBP<110       vitamin D3 (CHOLECALCIFEROL) 50 mcg (2000 units) tablet Take 50 mcg by mouth daily         MED REC REQUIRED  Post Medication Reconciliation Status: medication reconcilation previously completed during another office visit      ALLERGIES:  Allergies   Allergen Reactions     Propofol Nausea and Vomiting     Shellfish Allergy      Other reaction(s): Dizziness     Capsaicin Rash and Blisters       ROS:  10 point ROS were negative other than the symptoms noted above in the HPI.    PHYSICAL EXAM:  Vital signs were reviewed in the chart.  Vital Signs: /67   Pulse 81   Temp 98  F (36.7  C)   Resp 18   Ht 1.651 m (5' 5\")   Wt 89.8 kg (198 lb)   LMP  (LMP Unknown)   SpO2 94%   BMI 32.95 kg/m    General: Comfortable, cheerful, and in no acute distress  HEENT: Conjunctival pallor, no scleral icterus or injection, moist oral mucosa  Cardiovascular: Normal S1, S2, RRR  Respiratory: Lungs clear to auscultation bilaterally  GI: Abdomen slightly distended, nontender, bowel sounds are present  Extremities: 3+ bilateral LE edema, wraps are on  Neuro: CX II-XII grossly intact except for decreased hearing; ROM in all four extremities grossly " intact  Psych: Alert and oriented x3; normal affect  Skin: Left hand/wrist sutures have been removed and the wounds have healed    LABORATORY/IMAGING DATA:  All relevant labs and imaging data in [facility] and/or Care Everywhere were personally reviewed today.      Most Recent 3 CBC's:Recent Labs   Lab Test 03/30/23  1044 03/30/23  0553 03/23/23  0848 03/22/23  0753 07/03/22  2204   WBC 10.0 8.7  --   --  16.2*   HGB 9.8* 9.4* 9.0*   < > 10.8*   MCV 80 81  --   --  81    348  --   --  423    < > = values in this interval not displayed.     Most Recent 3 BMP's:Recent Labs   Lab Test 04/01/23  1242 04/01/23  1040 04/01/23  0848 03/31/23  0548 03/31/23  0535 03/30/23  1822 03/30/23  1044 03/30/23  0553   NA  --   --   --   --  142  --  141 141   POTASSIUM  --   --   --   --  3.7  --  3.8 3.6   CHLORIDE  --   --   --   --  101  --  100 101   CO2  --   --   --   --  31*  --  30* 31*   BUN  --   --   --   --  11.3  --  13.6 14.0   CR  --   --   --   --  0.87  --  0.96* 0.93   ANIONGAP  --   --   --   --  10  --  11 9   MACY  --   --   --   --  9.3  --  10.0 9.8   * 159* 105*   < > 96   < > 123* 116*    < > = values in this interval not displayed.         ASSESSMENT/PLAN:  Acute on chronic constipation,  IBS with constipation.  Acute constipation due to slow transit as well as opiate-induced.  No evidence of small bowel obstruction on x-ray.  No BM for the past 4-5 days but abdominal examination is benign and she has good bowel sounds.  Plan:  Continue lactulose 10 g every 8 hours  Continue MiraLAX 17 g twice daily  Continue Senokot S1 tablet twice daily  Prune juice as needed  Referral to GI as outpatient  Monitor bowel output    Essential hypertension,  Lymphedema.  Blood pressure is fairly controlled.  On today's examination, she has 3+ bilateral leg edema.  Plan:  Continue triamterene-HCTZ 37.5-25 mg, 1 tablet daily  Continue lipedema therapy/wraps  Monitor blood pressure and leg edema    Dyslipidemia.  Not on  medical therapy  Plan:  Follow-up as outpatient    DM type II,  Peripheral neuropathy.  Hemoglobin A1c 6.9% on March 21, 2023.  Blood glucose levels are in range of 119-201.  Plan:  Continue diabetic diet  Continue metformin 500 mg twice daily  Continue glipizide 5 mg twice daily  Continue Jardiance 25 mg daily  Continue pregabalin 50 mg twice daily  Monitor blood glucose    COPD,  Asthma.  Stable.  Not on inhalers.  Plan:   Monitor respiratory status    Chronic anemia.  Baseline hemoglobin 9-10.  Last hemoglobin was 9.8 on March 30.  Plan:  Monitor hemoglobin periodically    GERD.  Plan:  Continue omeprazole 20 mg daily    OAB.  Plan:  Continue oxybutynin ER 15 mg daily    Ménière's disease.  Plan:  Continue prednisone 6 mg daily  Continue triamterene-HCTZ 37.5-25 mg daily    Depression,  Anxiety,  Bipolar 1 disorder.  Plan:  Continue buspirone 7.5 mg 3 times daily  Continue duloxetine 60 mg daily  Continue lamotrigine 100 mg twice daily  Continue diazepam 2 mg every 6 hours as needed for anxiety  Continue hydroxyzine 25 mg 3 times daily for anxiety  Monitor symptoms  Refer to ACP PRN    Spinal stenosis of lumbar region,  Osteoarthritis,  Chronic pain syndrome,  Left carpal tunnel syndrome and left ring trigger finger, s/p surgical release on March 21, 2023,  Physical deconditioning.  Plan:  Continue pain management with acetaminophen, oxycodone, pregabalin, and duloxetine as ordered  Continue methocarbamol as needed for muscle spasms  Continue PT/OT evaluation and therapy  Follow-up with Ortho as directed    Obesity, BMI 33.  Plan:  Encourage weight loss  Staff to assist with daily care and mobility      Orders written by provider at facility:  None        Recommendation by provider at facility:  Refer to GI for constipation        Disclaimer: This note may contain text created using speech-recognition software and may contain unintended word substitutions.      Electronically signed by:  Conrad Oreilly,  MD

## 2023-04-06 ENCOUNTER — TRANSITIONAL CARE UNIT VISIT (OUTPATIENT)
Dept: GERIATRICS | Facility: CLINIC | Age: 83
End: 2023-04-06
Payer: MEDICARE

## 2023-04-06 ENCOUNTER — LAB REQUISITION (OUTPATIENT)
Dept: LAB | Facility: CLINIC | Age: 83
End: 2023-04-06
Payer: MEDICARE

## 2023-04-06 DIAGNOSIS — F31.9 BIPOLAR 1 DISORDER (H): ICD-10-CM

## 2023-04-06 DIAGNOSIS — M48.00 SPINAL STENOSIS, UNSPECIFIED SPINAL REGION: ICD-10-CM

## 2023-04-06 DIAGNOSIS — F41.9 ANXIETY: ICD-10-CM

## 2023-04-06 DIAGNOSIS — I10 ESSENTIAL HYPERTENSION: ICD-10-CM

## 2023-04-06 DIAGNOSIS — I89.0 LYMPHEDEMA: ICD-10-CM

## 2023-04-06 DIAGNOSIS — K59.01 SLOW TRANSIT CONSTIPATION: Primary | ICD-10-CM

## 2023-04-06 DIAGNOSIS — U07.1 COVID-19: ICD-10-CM

## 2023-04-06 DIAGNOSIS — N32.81 OAB (OVERACTIVE BLADDER): ICD-10-CM

## 2023-04-06 DIAGNOSIS — H81.09 MENIERE'S DISEASE, UNSPECIFIED LATERALITY: ICD-10-CM

## 2023-04-06 DIAGNOSIS — E11.21 TYPE 2 DIABETES MELLITUS WITH DIABETIC NEPHROPATHY, WITHOUT LONG-TERM CURRENT USE OF INSULIN (H): ICD-10-CM

## 2023-04-06 DIAGNOSIS — Z98.890 S/P CARPAL TUNNEL RELEASE: ICD-10-CM

## 2023-04-06 DIAGNOSIS — F32.A DEPRESSION, UNSPECIFIED DEPRESSION TYPE: ICD-10-CM

## 2023-04-06 DIAGNOSIS — J44.9 CHRONIC OBSTRUCTIVE PULMONARY DISEASE, UNSPECIFIED COPD TYPE (H): ICD-10-CM

## 2023-04-06 DIAGNOSIS — K21.9 GASTROESOPHAGEAL REFLUX DISEASE, UNSPECIFIED WHETHER ESOPHAGITIS PRESENT: ICD-10-CM

## 2023-04-06 DIAGNOSIS — K58.1 IRRITABLE BOWEL SYNDROME WITH CONSTIPATION: ICD-10-CM

## 2023-04-06 DIAGNOSIS — R53.81 PHYSICAL DECONDITIONING: ICD-10-CM

## 2023-04-06 DIAGNOSIS — G89.4 CHRONIC PAIN SYNDROME: ICD-10-CM

## 2023-04-06 DIAGNOSIS — E78.5 DYSLIPIDEMIA: ICD-10-CM

## 2023-04-06 DIAGNOSIS — D64.9 CHRONIC ANEMIA: ICD-10-CM

## 2023-04-06 PROCEDURE — 99305 1ST NF CARE MODERATE MDM 35: CPT | Performed by: INTERNAL MEDICINE

## 2023-04-06 PROCEDURE — U0003 INFECTIOUS AGENT DETECTION BY NUCLEIC ACID (DNA OR RNA); SEVERE ACUTE RESPIRATORY SYNDROME CORONAVIRUS 2 (SARS-COV-2) (CORONAVIRUS DISEASE [COVID-19]), AMPLIFIED PROBE TECHNIQUE, MAKING USE OF HIGH THROUGHPUT TECHNOLOGIES AS DESCRIBED BY CMS-2020-01-R: HCPCS | Performed by: NURSE PRACTITIONER

## 2023-04-07 ENCOUNTER — LAB REQUISITION (OUTPATIENT)
Dept: LAB | Facility: CLINIC | Age: 83
End: 2023-04-07
Payer: MEDICARE

## 2023-04-07 DIAGNOSIS — R30.0 DYSURIA: ICD-10-CM

## 2023-04-07 DIAGNOSIS — N39.0 URINARY TRACT INFECTION, SITE NOT SPECIFIED: ICD-10-CM

## 2023-04-07 DIAGNOSIS — G56.02 CARPAL TUNNEL SYNDROME OF LEFT WRIST: ICD-10-CM

## 2023-04-07 LAB — SARS-COV-2 RNA RESP QL NAA+PROBE: NEGATIVE

## 2023-04-07 PROCEDURE — 87086 URINE CULTURE/COLONY COUNT: CPT | Performed by: NURSE PRACTITIONER

## 2023-04-07 PROCEDURE — 81001 URINALYSIS AUTO W/SCOPE: CPT | Performed by: NURSE PRACTITIONER

## 2023-04-07 RX ORDER — OXYCODONE HYDROCHLORIDE 5 MG/1
5-10 TABLET ORAL
Qty: 30 TABLET | Refills: 0 | Status: SHIPPED | OUTPATIENT
Start: 2023-04-07 | End: 2023-04-11

## 2023-04-08 LAB
ALBUMIN UR-MCNC: NEGATIVE MG/DL
APPEARANCE UR: CLEAR
BILIRUB UR QL STRIP: NEGATIVE
COLOR UR AUTO: ABNORMAL
GLUCOSE UR STRIP-MCNC: >=1000 MG/DL
HGB UR QL STRIP: NEGATIVE
KETONES UR STRIP-MCNC: NEGATIVE MG/DL
LEUKOCYTE ESTERASE UR QL STRIP: ABNORMAL
MUCOUS THREADS #/AREA URNS LPF: PRESENT /LPF
NITRATE UR QL: NEGATIVE
PH UR STRIP: 5 [PH] (ref 5–7)
RBC URINE: 1 /HPF
SP GR UR STRIP: 1.03 (ref 1–1.03)
SQUAMOUS EPITHELIAL: 1 /HPF
UROBILINOGEN UR STRIP-MCNC: NORMAL MG/DL
WBC URINE: 6 /HPF

## 2023-04-09 LAB — BACTERIA UR CULT: NORMAL

## 2023-04-10 ENCOUNTER — LAB REQUISITION (OUTPATIENT)
Dept: LAB | Facility: CLINIC | Age: 83
End: 2023-04-10
Payer: MEDICARE

## 2023-04-10 DIAGNOSIS — U07.1 COVID-19: ICD-10-CM

## 2023-04-10 PROCEDURE — U0003 INFECTIOUS AGENT DETECTION BY NUCLEIC ACID (DNA OR RNA); SEVERE ACUTE RESPIRATORY SYNDROME CORONAVIRUS 2 (SARS-COV-2) (CORONAVIRUS DISEASE [COVID-19]), AMPLIFIED PROBE TECHNIQUE, MAKING USE OF HIGH THROUGHPUT TECHNOLOGIES AS DESCRIBED BY CMS-2020-01-R: HCPCS | Performed by: NURSE PRACTITIONER

## 2023-04-11 ENCOUNTER — LAB REQUISITION (OUTPATIENT)
Dept: LAB | Facility: CLINIC | Age: 83
End: 2023-04-11
Payer: MEDICARE

## 2023-04-11 ENCOUNTER — TRANSITIONAL CARE UNIT VISIT (OUTPATIENT)
Dept: GERIATRICS | Facility: CLINIC | Age: 83
End: 2023-04-11
Payer: MEDICARE

## 2023-04-11 VITALS
TEMPERATURE: 98.7 F | HEART RATE: 81 BPM | OXYGEN SATURATION: 93 % | DIASTOLIC BLOOD PRESSURE: 83 MMHG | RESPIRATION RATE: 16 BRPM | BODY MASS INDEX: 33.09 KG/M2 | HEIGHT: 65 IN | SYSTOLIC BLOOD PRESSURE: 165 MMHG | WEIGHT: 198.6 LBS

## 2023-04-11 DIAGNOSIS — G56.02 CARPAL TUNNEL SYNDROME OF LEFT WRIST: Primary | ICD-10-CM

## 2023-04-11 DIAGNOSIS — D64.9 ACUTE ANEMIA: ICD-10-CM

## 2023-04-11 DIAGNOSIS — I89.0 LYMPHEDEMA: ICD-10-CM

## 2023-04-11 DIAGNOSIS — N32.81 OAB (OVERACTIVE BLADDER): ICD-10-CM

## 2023-04-11 DIAGNOSIS — D64.9 CHRONIC ANEMIA: ICD-10-CM

## 2023-04-11 DIAGNOSIS — J45.20 MILD INTERMITTENT ASTHMA WITHOUT COMPLICATION: ICD-10-CM

## 2023-04-11 DIAGNOSIS — R53.81 PHYSICAL DECONDITIONING: ICD-10-CM

## 2023-04-11 DIAGNOSIS — F31.9 BIPOLAR 1 DISORDER (H): ICD-10-CM

## 2023-04-11 DIAGNOSIS — D64.9 ANEMIA, UNSPECIFIED: ICD-10-CM

## 2023-04-11 DIAGNOSIS — K21.9 GASTROESOPHAGEAL REFLUX DISEASE, UNSPECIFIED WHETHER ESOPHAGITIS PRESENT: ICD-10-CM

## 2023-04-11 DIAGNOSIS — R19.5 LOOSE STOOLS: ICD-10-CM

## 2023-04-11 DIAGNOSIS — F41.9 ANXIETY: ICD-10-CM

## 2023-04-11 DIAGNOSIS — Z98.890 S/P CARPAL TUNNEL RELEASE: ICD-10-CM

## 2023-04-11 DIAGNOSIS — K58.1 IRRITABLE BOWEL SYNDROME WITH CONSTIPATION: ICD-10-CM

## 2023-04-11 DIAGNOSIS — L29.9 ITCHING: ICD-10-CM

## 2023-04-11 DIAGNOSIS — J44.9 CHRONIC OBSTRUCTIVE PULMONARY DISEASE, UNSPECIFIED COPD TYPE (H): ICD-10-CM

## 2023-04-11 DIAGNOSIS — G89.4 CHRONIC PAIN SYNDROME: ICD-10-CM

## 2023-04-11 DIAGNOSIS — F32.A DEPRESSION, UNSPECIFIED DEPRESSION TYPE: ICD-10-CM

## 2023-04-11 DIAGNOSIS — M48.00 SPINAL STENOSIS, UNSPECIFIED SPINAL REGION: ICD-10-CM

## 2023-04-11 DIAGNOSIS — K59.01 SLOW TRANSIT CONSTIPATION: ICD-10-CM

## 2023-04-11 DIAGNOSIS — R19.7 DIARRHEA, UNSPECIFIED: ICD-10-CM

## 2023-04-11 DIAGNOSIS — I10 ESSENTIAL HYPERTENSION: ICD-10-CM

## 2023-04-11 DIAGNOSIS — E11.21 TYPE 2 DIABETES MELLITUS WITH DIABETIC NEPHROPATHY, WITHOUT LONG-TERM CURRENT USE OF INSULIN (H): ICD-10-CM

## 2023-04-11 LAB — SARS-COV-2 RNA RESP QL NAA+PROBE: NEGATIVE

## 2023-04-11 PROCEDURE — 99310 SBSQ NF CARE HIGH MDM 45: CPT | Performed by: NURSE PRACTITIONER

## 2023-04-11 RX ORDER — OXYCODONE HYDROCHLORIDE 5 MG/1
5-10 TABLET ORAL EVERY 6 HOURS PRN
Qty: 30 TABLET | Refills: 0 | Status: SHIPPED | OUTPATIENT
Start: 2023-04-11 | End: 2024-01-28

## 2023-04-11 RX ORDER — OXYCODONE HYDROCHLORIDE 5 MG/1
5-10 TABLET ORAL EVERY 6 HOURS PRN
Qty: 30 TABLET | Refills: 0
Start: 2023-04-11 | End: 2023-04-11

## 2023-04-11 RX ORDER — LANOLIN ALCOHOL/MO/W.PET/CERES
3 CREAM (GRAM) TOPICAL
Start: 2023-04-11

## 2023-04-11 NOTE — PATIENT INSTRUCTIONS
"Orders  Jumana WINTERS Trey  1940  1) Discontinue order from 3/27 about \" no pushing, pulling...\" as orders were updated from surgeon on 4/4 with her activity limitations  2) CBC, BMP 4/12. Diagnosis: anemia, loose stools  3) Start Calmoseptine and apply after each BM. Diagnosis: loose stools  4) Limit electrolyte drinks to one daily.  5) Change end date of diazepam to 1 month from today  6) Reduce oxycodone to 5-10 mg q6h PRN for pain.   Diamond Simmons, JUAN ANTONIO CNP on 4/11/2023 at 10:38 AM    "

## 2023-04-11 NOTE — PROGRESS NOTES
[facility]    Outpatient Physical Therapy Discharge Note  Patient: Jumana Yang  : 1940    Beginning/End Dates of Reporting Period:  22 to 23    Referring Provider: Caitlin Osullivan PA-C    Therapy Diagnosis: Lymphedema; Edema, unspecified type     Client Self Report:  Pt arriving with PCA. Has been wearing garments, but takes a long time to put on independently. Continues to ask if can wear transition socks without velcro    Objective Measurements:  Objective Measure: Volume  Details: As per flow sheet  Objective Measure: Skin/ Edema  Details: Feet slightly puffy, arrived without compressino following shower. Measurements show mild decrease bilaterally                              Outcome Measures (most recent score):       Goals:  Goal Identifier Garments   Goal Description Pt will obtain and utilize appropriate BLE compression garments to manage BLE edema.   Target Date 23   Date Met  23   Progress (detail required for progress note): Pt has recieved and can don appropriate BLE compression     Goal Identifier HEP   Goal Description Pt will demonstrate understanding of edema HEP including exercise, compression and self massage.   Target Date 23   Date Met   23   Progress (detail required for progress note): In progress, pt still requires reinforcement regarding need for consistent compression but has support from family and PCA     Goal Identifier Edema edu   Goal Description Pt will demonstrate understanding of long term care management requirements for edema to prevent exacerbation or progression   Target Date 23   Date Met  23   Progress (detail required for progress note): In progress, pt continues to require reinforcement on need for LE compression       Plan:  Discharge from therapy.    Discharge:    Reason for Discharge: Patient has met all  goals.  Patient has failed to schedule further appointments.    Equipment Issued: Compression recommendations, currently in [facility]    Discharge Plan: Patient to continue home program.

## 2023-04-11 NOTE — PROGRESS NOTES
[facility] GERIATRICS    Chief Complaint   Patient presents with     RECHECK     HPI:  Jumana Yang is a 82 year old  (1940), who is being seen today for an episodic care visit at: [facility]  ([facility]) [[id number]].     PMH significant for HTN, lymphedema, type 2 DM, COPD, asthma, chronic pain with history of severe spinal stenosis (follows with pain clinic), anxiety, depression, bipolar disease, GERD, hard of hearing, meniere disease, overactive bladder, irritable bowel syndrome.     Summary of recent hospitalizations:  Patient was admitted at [facility] from 3/20/2023 to 3/27/2023 for elective left open carpal tunnel release and left ring finger A1 pulley release. Hemoglobin dropped secondary to surgery down to 9.0 on 3/23/2023. Hospitalization course uncomplicated. Discharged to [facility] for physical rehabilitation and medical management.      Most recently, patient was admitted at [facility] for constipation. Xray abdomen shows large stool throughout the colon, no evidence for small bowel obstruction, no free air at decubitus imaging.  Manual disimpaction in the emergency department was unsuccessful. Bowel regimen adjusted and started to have bowel movements inpatient. Discharged to [facility] for physical rehabilitation and medical management.       Today was seen for routine follow-up in the TCU. Patient is [place]- I used a clear face mask and communication board with her today during the visit. She reports that she is having loose stools today.  She is wondering if it was related to the milk that she has been drinking, at home she drinks almond milk.  She also reports rectal pain from the loose stools and wiping related to it.  We discussed adding Calmoseptine cream after a bowel movement, I also spoke to the nurse and he has a refrigerator that patient can use once she opens her almond milk.  She denies shortness of breath, chest pain, dizziness/lightheadedness.  She continues to have her  "chronic back pain, as well as some pain to her wrist.  She denies dysuria/trouble voiding.  She continues to work with therapy.    Reviewed facility EMR including medications, recent nursing progress notes, vital signs.  Discussed patient with nursing staff including plan of care as below.    Allergies, and PMH/PSH reviewed in EPIC today.  REVIEW OF SYSTEMS:  6 point ROS of systems including Constitutional,  Respiratory, Cardiovascular, Gastroenterology, Genitourinary,  Musculoskeletal were all negative except for pertinent positives noted in my HPI.    Objective:   BP (!) 165/83   Pulse 81   Temp 98.7  F (37.1  C)   Resp 16   Ht 1.651 m (5' 5\")   Wt 90.1 kg (198 lb 9.6 oz)   LMP  (LMP Unknown)   SpO2 93%   BMI 33.05 kg/m    GENERAL APPEARANCE:  Alert, in NAD  HEENT: normocephalic, moist mucous membranes, nose without drainage or crusting, [place]  RESP:  respiratory effort normal, no respiratory distress, Lung sounds clear, patient is on RA  CV: auscultation of heart done, rate and rhythm regular.  ABDOMEN: + bowel sounds, nontender, no grimacing or guarding with palpation.  M/S: chronic lymphedema to bilateral lower extremities with wraps in place  NEURO: cranial nerves 2-12 grossly intact and at patient's baseline; moves extremities freely  PSYCH: affect and mood normal      Labs done in SNF are in [place]. Please refer to them using EPIC/Care Everywhere. and Recent labs in [facility] reviewed by me today.     Assessment/Plan:  S/p Left open carpal tunnel release and left ring finger A1 pulley release on 3/21/2023  -had follow up with surgeon on 4/4/2023, brace recommended for transfers and wheelchair use only, no brace with light activity and rest  -continues to have pain  -using oxycodone 2-4 times per day (Also used for back pain)  Plan: Reduce oxycodone to 5-10 mg q6h PRN. Continue current pain regimen with Tylenol 650 mg every 4 hours as needed, methocarbamol 500 mg 3 times daily as needed. Narcan PRN. " Facility to discontinue old precautions. Continue new precautions: brace recommended for transfers and wheelchair use only, no brace with light activity and rest. Follow up with surgeon PRN.     Acute blood loss anemia  Chronic anemia  -with acute drop secondary to surgery  -baseline hemoglobin 10  -hemoglobin 9.8 on 3/30/3023, almost back to baseline  Plan: CBC 4/12 to ensure stability     Irritable bowel syndrome  Loose stools  Slow transit constipation  -suspected to be secondary to lack of mobility and recent surgery  -bowels moving too well now - suspect secondary to milk products, declines imodium  Plan: Discussed with nursing and they can refrigerate her almond milk. Encourage hydration, she is also drinking electrolyte drinks. BMP 4/12. Continue lactulose to 10 g every 8 hours as needed, MiraLAX 17 g twice daily as needed, Senna S1 tab twice daily as needed. Monitor bowels closely- nursing to ensure patient has BM daily. Discussed with patient and nursing to apply Calmoseptine after BM and use wipes for hygiene. Follow up with GI outpatient     Essential hypertension  Lymphedema  -Blood pressure elevated at times- wonder if related to salt in electrolyte drinks?  -Reports chronic lymphedema- has wraps in place today  Plan: Continue triamterene-HCTZ 37.5-25 mg, take 1 tab daily. BMP 4/12. Limit electrolyte drinks to one daily. Continue compression wraps on in a.m. off in p.m. VS per policy. Adjust medications as needed.     Type 2 diabetes  -A1c 6.9% on 3/21/2023  -PTA glipizide was reduced on admission to TCU due to low BS  -BS  recently  Plan: Conitnue glipizide 5 mg BID. Continue Jardiance 25 mg daily, metformin 500 mg twice daily. Carb controlled diet. Continue BS monitoring to TID. Update provider if BS regularly elevated above 200.     COPD  Asthma  -Respiratory status stable  Plan: Not currently on medical management.  Monitor respiratory status.     Itching  Plan: Continue hydroxyzine 25 mg TID  PRN and calamine lotion. Can consider adding zyrtec daily PRN if ongoing symptoms.     GERD  Plan: Continue omeprazole 20 mg daily.     Chronic pain with history of severe spinal stenosis   -follows with pain clinic and PTA regimen is methocarbamol 500 mg 3 times daily as needed, Percocet 1 tablet every 6 hours as needed, pregabalin 50 mg twice daily, duloxetine 60 mg daily, prednisone 6 mg daily  -using oxycodone multiple times daily as above  -reports severe back pain today  Plan: Reduce oxycodone to 5-10 mg q6h PRN. Continue Tylenol 650 mg every 4 hours as needed, duloxetine 60 mg daily, methocarbamol 500 mg 3 times daily as needed, prednisone 6 mg daily, and pregabalin 50 mg twice daily. Monitor pain. Follow up with pain clinic per recommendations.      Anxiety  Depression  Bipolar 1 disorder  Chronic, used diazepam last 1 week ago  Plan: Continue BuSpar 7.5 mg 3 times daily, diazepam 2 mg every 6 hours as needed, duloxetine 60 mg daily, hydroxyzine 25 mg 3 times daily as needed, lamotrigine 100 mg twice daily. Monitor mood and symptoms. Consider ACP referral PRN. Continuation of PRN order for non-antipsychotic psychotropic medication,  diazepam, is appropriate due to sporadic anxiety and is being reordered today with an end date of 1 month.      Overactive bladder  Plan: Continue oxybutynin ER 15 mg daily. Monitor symptoms     Physical deconditioning  Comment: Acute, secondary to surgery, medical conditions as above  Plan: Encourage participation in physical therapy/occupational therapy for strengthening and deconditioning. Discharge planning per their recommendation. Social work to assist with d/c planning.          MED REC REQUIRED  Post Medication Reconciliation Status:  Medication reconciliation previously completed during another office visit        Disclaimer: This note may contain text created using speech-recognition software and may contain unintended word substitutions.       Electronically signed by:  Diamond Simmons, APRN CNP

## 2023-04-13 ENCOUNTER — LAB REQUISITION (OUTPATIENT)
Dept: LAB | Facility: CLINIC | Age: 83
End: 2023-04-13
Payer: MEDICARE

## 2023-04-13 DIAGNOSIS — U07.1 COVID-19: ICD-10-CM

## 2023-04-13 LAB
ANION GAP SERPL CALCULATED.3IONS-SCNC: 13 MMOL/L (ref 7–15)
BUN SERPL-MCNC: 20 MG/DL (ref 8–23)
CALCIUM SERPL-MCNC: 9.8 MG/DL (ref 8.8–10.2)
CHLORIDE SERPL-SCNC: 101 MMOL/L (ref 98–107)
CREAT SERPL-MCNC: 0.74 MG/DL (ref 0.51–0.95)
DEPRECATED HCO3 PLAS-SCNC: 26 MMOL/L (ref 22–29)
ERYTHROCYTE [DISTWIDTH] IN BLOOD BY AUTOMATED COUNT: 17.5 % (ref 10–15)
GFR SERPL CREATININE-BSD FRML MDRD: 81 ML/MIN/1.73M2
GLUCOSE SERPL-MCNC: 140 MG/DL (ref 70–99)
HCT VFR BLD AUTO: 34.9 % (ref 35–47)
HGB BLD-MCNC: 9.3 G/DL (ref 11.7–15.7)
MCH RBC QN AUTO: 21.5 PG (ref 26.5–33)
MCHC RBC AUTO-ENTMCNC: 26.6 G/DL (ref 31.5–36.5)
MCV RBC AUTO: 81 FL (ref 78–100)
PLATELET # BLD AUTO: 420 10E3/UL (ref 150–450)
POTASSIUM SERPL-SCNC: 4.1 MMOL/L (ref 3.4–5.3)
RBC # BLD AUTO: 4.33 10E6/UL (ref 3.8–5.2)
SODIUM SERPL-SCNC: 140 MMOL/L (ref 136–145)
WBC # BLD AUTO: 10.1 10E3/UL (ref 4–11)

## 2023-04-13 PROCEDURE — 82310 ASSAY OF CALCIUM: CPT | Performed by: NURSE PRACTITIONER

## 2023-04-13 PROCEDURE — 85027 COMPLETE CBC AUTOMATED: CPT | Performed by: NURSE PRACTITIONER

## 2023-04-13 PROCEDURE — 36415 COLL VENOUS BLD VENIPUNCTURE: CPT | Performed by: NURSE PRACTITIONER

## 2023-04-13 PROCEDURE — P9604 ONE-WAY ALLOW PRORATED TRIP: HCPCS | Performed by: NURSE PRACTITIONER

## 2023-04-13 PROCEDURE — U0003 INFECTIOUS AGENT DETECTION BY NUCLEIC ACID (DNA OR RNA); SEVERE ACUTE RESPIRATORY SYNDROME CORONAVIRUS 2 (SARS-COV-2) (CORONAVIRUS DISEASE [COVID-19]), AMPLIFIED PROBE TECHNIQUE, MAKING USE OF HIGH THROUGHPUT TECHNOLOGIES AS DESCRIBED BY CMS-2020-01-R: HCPCS | Performed by: NURSE PRACTITIONER

## 2023-04-14 ENCOUNTER — TRANSITIONAL CARE UNIT VISIT (OUTPATIENT)
Dept: GERIATRICS | Facility: CLINIC | Age: 83
End: 2023-04-14
Payer: MEDICARE

## 2023-04-14 VITALS
OXYGEN SATURATION: 95 % | BODY MASS INDEX: 33.09 KG/M2 | HEART RATE: 73 BPM | SYSTOLIC BLOOD PRESSURE: 153 MMHG | TEMPERATURE: 98.1 F | HEIGHT: 65 IN | RESPIRATION RATE: 18 BRPM | DIASTOLIC BLOOD PRESSURE: 67 MMHG | WEIGHT: 198.6 LBS

## 2023-04-14 DIAGNOSIS — R19.5 LOOSE STOOLS: ICD-10-CM

## 2023-04-14 DIAGNOSIS — I89.0 LYMPHEDEMA: ICD-10-CM

## 2023-04-14 DIAGNOSIS — I10 ESSENTIAL HYPERTENSION: ICD-10-CM

## 2023-04-14 DIAGNOSIS — K58.1 IRRITABLE BOWEL SYNDROME WITH CONSTIPATION: Primary | ICD-10-CM

## 2023-04-14 DIAGNOSIS — K59.01 SLOW TRANSIT CONSTIPATION: ICD-10-CM

## 2023-04-14 LAB — SARS-COV-2 RNA RESP QL NAA+PROBE: NEGATIVE

## 2023-04-14 PROCEDURE — 99309 SBSQ NF CARE MODERATE MDM 30: CPT | Performed by: NURSE PRACTITIONER

## 2023-04-14 RX ORDER — LISINOPRIL 5 MG/1
5 TABLET ORAL DAILY
COMMUNITY
End: 2024-01-28

## 2023-04-14 NOTE — PATIENT INSTRUCTIONS
Scott Yang  1940  1) Start lisinopril 5 mg daily. Hold if SBP<110. DIAGNOSIS: HTN  2) BMP 4/19. Diagnosis: HTN  JUAN ANTONIO Rose CNP on 4/14/2023 at 9:12 AM

## 2023-04-14 NOTE — PROGRESS NOTES
"[facility] GERIATRICS    Chief Complaint   Patient presents with     Nursing Home Acute     HPI:  Jumana Yang is a 82 year old  (1940), who is being seen today for an episodic care visit at: [facility]  ([facility]) [[id number]].     Today's concern is:   1. Irritable bowel syndrome with constipation    2. Slow transit constipation    3. Loose stools    4. Essential hypertension    5. Lymphedema      Patient with loose stools at visit earlier this week. Reports now bowels are moving regularly, no abdominal pain or cramping. Last BM yesterday. With elevated BP, -169 recently, HR 70-80s    Allergies, and PMH/PSH reviewed in EPIC today.  REVIEW OF SYSTEMS:  2 point ROS of systems including Constitutional, Gastroenterology were all negative except for pertinent positives noted in my HPI.    Objective:   BP (!) 153/67   Pulse 73   Temp 98.1  F (36.7  C)   Resp 18   Ht 1.651 m (5' 5\")   Wt 90.1 kg (198 lb 9.6 oz)   LMP  (LMP Unknown)   SpO2 95%   BMI 33.05 kg/m    GENERAL APPEARANCE:  Alert, in NAD  HEENT: normocephalic, moist mucous membranes, nose without drainage or crusting, very [place]  RESP:  respiratory effort normal, no respiratory distress, patient is on RA  ABDOMEN: + bowel sounds, soft, nontender, no grimacing or guarding with palpation.  PSYCH:  affect and mood normal      Labs done in SNF are in [facility]. Please refer to them using [facility]/Care Everywhere. and Recent labs in [facility] reviewed by me today.     Assessment/Plan:  Irritable bowel syndrome  Loose stools  Slow transit constipation  -suspected to be secondary to lack of mobility and recent surgery  -bowels moving too well now - suspect secondary to milk products, declines imodium  Plan: Discussed with nursing and they can refrigerate her almond milk. Encourage hydration, she is also drinking electrolyte drinks. BMP 4/12. Continue lactulose to 10 g every 8 hours as needed, MiraLAX 17 g twice daily as needed, Senna S1 tab " twice daily as needed. Monitor bowels closely- nursing to ensure patient has BM daily. Discussed with patient and nursing to apply Calmoseptine after BM and use wipes for hygiene. Follow up with GI outpatient     Essential hypertension  Lymphedema  -Blood pressure elevated at times  -has been on coreg, lisinopril and amlodipine in the past  Plan: Start lisinopril 5 mg daily. Continue triamterene-HCTZ 37.5-25 mg, take 1 tab daily. BMP 4/19. Limit electrolyte drinks to one daily. Continue compression wraps on in a.m. off in p.m. VS per policy. Adjust medications as needed.    MED REC REQUIRED  Post Medication Reconciliation Status:  Medication reconciliation previously completed during another office visit        Disclaimer: This note may contain text created using speech-recognition software and may contain unintended word substitutions.       Electronically signed by: JUAN ANTONIO Rose CNP

## 2023-04-17 ENCOUNTER — DISCHARGE SUMMARY NURSING HOME (OUTPATIENT)
Dept: GERIATRICS | Facility: CLINIC | Age: 83
End: 2023-04-17
Payer: MEDICARE

## 2023-04-17 ENCOUNTER — LAB REQUISITION (OUTPATIENT)
Dept: LAB | Facility: CLINIC | Age: 83
End: 2023-04-17
Payer: MEDICARE

## 2023-04-17 VITALS
OXYGEN SATURATION: 94 % | HEIGHT: 65 IN | BODY MASS INDEX: 33.09 KG/M2 | WEIGHT: 198.6 LBS | HEART RATE: 86 BPM | DIASTOLIC BLOOD PRESSURE: 75 MMHG | SYSTOLIC BLOOD PRESSURE: 126 MMHG | TEMPERATURE: 97.8 F | RESPIRATION RATE: 18 BRPM

## 2023-04-17 DIAGNOSIS — F32.A DEPRESSION, UNSPECIFIED DEPRESSION TYPE: ICD-10-CM

## 2023-04-17 DIAGNOSIS — G89.4 CHRONIC PAIN SYNDROME: ICD-10-CM

## 2023-04-17 DIAGNOSIS — K59.01 SLOW TRANSIT CONSTIPATION: ICD-10-CM

## 2023-04-17 DIAGNOSIS — R53.81 PHYSICAL DECONDITIONING: ICD-10-CM

## 2023-04-17 DIAGNOSIS — E11.21 TYPE 2 DIABETES MELLITUS WITH DIABETIC NEPHROPATHY, WITHOUT LONG-TERM CURRENT USE OF INSULIN (H): ICD-10-CM

## 2023-04-17 DIAGNOSIS — B37.0 THRUSH: ICD-10-CM

## 2023-04-17 DIAGNOSIS — N32.81 OAB (OVERACTIVE BLADDER): ICD-10-CM

## 2023-04-17 DIAGNOSIS — M48.00 SPINAL STENOSIS, UNSPECIFIED SPINAL REGION: ICD-10-CM

## 2023-04-17 DIAGNOSIS — D64.9 CHRONIC ANEMIA: ICD-10-CM

## 2023-04-17 DIAGNOSIS — I89.0 LYMPHEDEMA: ICD-10-CM

## 2023-04-17 DIAGNOSIS — F31.9 BIPOLAR 1 DISORDER (H): ICD-10-CM

## 2023-04-17 DIAGNOSIS — Z98.890 S/P CARPAL TUNNEL RELEASE: ICD-10-CM

## 2023-04-17 DIAGNOSIS — G56.02 CARPAL TUNNEL SYNDROME OF LEFT WRIST: Primary | ICD-10-CM

## 2023-04-17 DIAGNOSIS — K58.1 IRRITABLE BOWEL SYNDROME WITH CONSTIPATION: ICD-10-CM

## 2023-04-17 DIAGNOSIS — K21.9 GASTROESOPHAGEAL REFLUX DISEASE, UNSPECIFIED WHETHER ESOPHAGITIS PRESENT: ICD-10-CM

## 2023-04-17 DIAGNOSIS — J45.20 MILD INTERMITTENT ASTHMA WITHOUT COMPLICATION: ICD-10-CM

## 2023-04-17 DIAGNOSIS — I10 ESSENTIAL HYPERTENSION: ICD-10-CM

## 2023-04-17 DIAGNOSIS — D64.9 ACUTE ANEMIA: ICD-10-CM

## 2023-04-17 DIAGNOSIS — J44.9 CHRONIC OBSTRUCTIVE PULMONARY DISEASE, UNSPECIFIED COPD TYPE (H): ICD-10-CM

## 2023-04-17 DIAGNOSIS — R19.5 LOOSE STOOLS: ICD-10-CM

## 2023-04-17 DIAGNOSIS — F41.9 ANXIETY: ICD-10-CM

## 2023-04-17 DIAGNOSIS — U07.1 COVID-19: ICD-10-CM

## 2023-04-17 PROCEDURE — U0003 INFECTIOUS AGENT DETECTION BY NUCLEIC ACID (DNA OR RNA); SEVERE ACUTE RESPIRATORY SYNDROME CORONAVIRUS 2 (SARS-COV-2) (CORONAVIRUS DISEASE [COVID-19]), AMPLIFIED PROBE TECHNIQUE, MAKING USE OF HIGH THROUGHPUT TECHNOLOGIES AS DESCRIBED BY CMS-2020-01-R: HCPCS | Mod: ORL | Performed by: NURSE PRACTITIONER

## 2023-04-17 PROCEDURE — 99316 NF DSCHRG MGMT 30 MIN+: CPT | Performed by: NURSE PRACTITIONER

## 2023-04-17 RX ORDER — AMOXICILLIN 250 MG
1 CAPSULE ORAL 2 TIMES DAILY PRN
Start: 2023-04-17 | End: 2024-01-28

## 2023-04-17 RX ORDER — NYSTATIN 100000/ML
500000 SUSPENSION, ORAL (FINAL DOSE FORM) ORAL 4 TIMES DAILY
Start: 2023-04-17 | End: 2024-01-28

## 2023-04-17 NOTE — PROGRESS NOTES
[facility] GERIATRICS DISCHARGE SUMMARY  PATIENT'S NAME: Jumana Yang  YOB: 1940  MEDICAL RECORD NUMBER:  [id number]  Place of Service where encounter took place:  [facility]  ([facility]) [[id number]]    PRIMARY CARE PROVIDER AND CLINIC RESPONSIBLE AFTER TRANSFER:   Ananya Mclean NP, [facility] [address]  / [address]*    Non-[facility] Provider     Transferring providers: JUAN ANTONIO Rose CNP, Conrad Oreilly MD  Recent Hospitalization/ED:  [facility] stay 3/21/23 to 3/27/23.  Date of SNF Admission: March 27, 2023  Date of SNF (anticipated) Discharge: April 17, 2023-patient driven discharge  Discharged to: back home with PCA services  Cognitive Scores: SLUMS 17/30, CPT 4.7/5.6  Physical Function: Ambulates 15 feet with front wheel walker contact-guard assist to min assist, contact-guard assist for bed mobility and transfers, moderately independent for grooming/hygiene, upper body dressing, lower body dressing except for max assist for [place] wraps, moderately independent for toileting      CODE STATUS/ADVANCE DIRECTIVES DISCUSSION:  Full code  ALLERGIES: Propofol, Shellfish allergy, and Capsaicin    NURSING FACILITY COURSE     PMH significant for HTN, lymphedema, type 2 DM, COPD, asthma, chronic pain with history of severe spinal stenosis (follows with pain clinic), anxiety, depression, bipolar disease, GERD, hard of hearing, meniere disease, overactive bladder, irritable bowel syndrome.     Summary of recent hospitalizations:  Patient was admitted at [facility] from 3/20/2023 to 3/27/2023 for elective left open carpal tunnel release and left ring finger A1 pulley release. Hemoglobin dropped secondary to surgery down to 9.0 on 3/23/2023. Hospitalization course uncomplicated. Discharged to TCU for physical rehabilitation and medical management.        Summary of nursing facility stay:   Patient is  requesting to discharge home today after care conference.  She has PCA who was also at care conference and is able to assist her at home.  Patient reports chronic pain that is unchanged from baseline.  She denies shortness of breath, chest pain, dizziness/lightheadedness.  Reports bowels moving regularly.  Denies diarrhea.  Does report thrush in her mouth and was started on nystatin by on-call over the weekend.  She denies dysuria/trouble voiding.  She is choosing to discharge home.    S/p Left open carpal tunnel release and left ring finger A1 pulley release on 3/21/2023  -had follow up with surgeon on 4/4/2023, brace recommended for transfers and wheelchair use only, no brace with light activity and rest  Plan:Continue oxycodone 5-10 mg q6h PRN. Continue current pain regimen with Tylenol 650 mg every 4 hours as needed, methocarbamol 500 mg 3 times daily as needed. Narcan PRN. Follow up with pain clinic/ PCP to continue to wean oxycodone back to PTA pain regimen. Continue brace recommended for transfers and wheelchair use only, no brace with light activity and rest. Follow up with surgeon PRN.     Acute blood loss anemia  Chronic anemia  -with acute drop secondary to surgery  -baseline hemoglobin 10  -hemoglobin 9.3 on 4/13/3023, almost back to baseline  Plan: CBC at follow up with PCP     Irritable bowel syndrome  Loose stools  Slow transit constipation  -suspected to be secondary to lack of mobility and recent surgery  -bowels moving regularly now, denies loose stools or constipation today  Plan: Continue lactulose to 10 g every 8 hours as needed, MiraLAX 17 g twice daily as needed, Senna S1 tab twice daily as needed. Monitor bowels closely. Follow up with GI outpatient     Thrush  -started on nystatin by on call, reports pain is improving  Plan: continue 7 day course of nystatin. Follow up with PCP    Essential hypertension  Lymphedema  -started on lisinopril in TCU due to elevated BP  -BP fair control  -Reports  chronic lymphedema- has wraps in place today  Plan: Continue triamterene-HCTZ 37.5-25 mg, take 1 tab daily and lisinopril 5 mg daily. BMP next week with PCP. Continue compression wraps on in a.m. off in p.m. VS per policy. Adjust medications as needed.     Type 2 diabetes  -A1c 6.9% on 3/21/2023  -PTA glipizide was reduced on admission to TCU due to low BS  --305, majority BS <200  Plan: Conitnue glipizide 5 mg BID. Continue Jardiance 25 mg daily, metformin 500 mg twice daily. Carb controlled diet. Continue BS monitoring to TID. Update provider if BS regularly elevated above 200.     COPD  Asthma  -Respiratory status stable  Plan: Not currently on medical management.  Monitor respiratory status.     Itching, resolved  Plan: Continue hydroxyzine 25 mg TID PRN and calamine lotion. Can consider adding zyrtec daily PRN if ongoing symptoms.     GERD  Plan: Continue omeprazole 20 mg daily.     Chronic pain with history of severe spinal stenosis   -follows with pain clinic and PTA regimen is methocarbamol 500 mg 3 times daily as needed, Percocet 1 tablet every 6 hours as needed, pregabalin 50 mg twice daily, duloxetine 60 mg daily, prednisone 6 mg daily  -using oxycodone multiple times daily in TCU   -reports ongoing chronic back pain tioday  Plan: Continue oxycodone 5-10 mg q6h PRN. Continue Tylenol 650 mg every 4 hours as needed, duloxetine 60 mg daily, methocarbamol 500 mg 3 times daily as needed, prednisone 6 mg daily, and pregabalin 50 mg twice daily. Monitor pain. Follow up with pain clinic outpatient     Anxiety  Depression  Bipolar 1 disorder  Chronic, has not used diazepam recently in TCU  Plan: Continue BuSpar 7.5 mg 3 times daily, diazepam 2 mg every 6 hours as needed, duloxetine 60 mg daily, hydroxyzine 25 mg 3 times daily as needed, lamotrigine 100 mg twice daily. Monitor mood and symptoms. Follow up with PCP     Overactive bladder  Plan: Continue oxybutynin ER 15 mg daily. Monitor symptoms     Cognitive  impairment  -SLUMS 17/30, CPT 4.7/5.6  Plan: Ok to live alone with daily assist to monitor for safety/solve new problems and assist with higher level IADLs like medication management and finances    Physical deconditioning  Comment: Acute, secondary to surgery, medical conditions as above  -completed course of therapy in TCU  Plan: Continue therapy through home care.       Discharge Medications:  MED REC REQUIRED{  Post Medication Reconciliation Status:  Medication reconciliation previously completed during another office visit      Current Outpatient Medications   Medication Sig Dispense Refill     acetaminophen (TYLENOL) 325 MG tablet Take 2 tablets (650 mg) by mouth every 4 hours as needed for other (For optimal non-opioid multimodal pain management to improve pain control.) 100 tablet 0     busPIRone (BUSPAR) 7.5 MG tablet Take 7.5 mg by mouth 3 times daily       calamine 8-8 % lotion Apply topically every hour as needed for itching 118 mL 0     clobetasol (TEMOVATE) 0.05 % external cream Apply topically 2 times daily       cyanocobalamin 1000 MCG SUBL Place 1,000 mcg under the tongue daily       diazepam (VALIUM) 2 MG tablet Take 1 tablet (2 mg) by mouth every 6 hours as needed for anxiety 10 tablet 0     DULoxetine (CYMBALTA) 60 MG capsule Take 60 mg by mouth every morning       glipiZIDE (GLUCOTROL) 5 MG tablet Take 1 tablet (5 mg) by mouth 2 times daily (before meals)       hydrOXYzine (ATARAX) 25 MG tablet Take 25 mg by mouth 3 times daily as needed for anxiety       JARDIANCE 25 MG TABS tablet Take 25 mg by mouth every morning       lactulose (CHRONULAC) 10 GM/15ML solution Take 15 mLs (10 g) by mouth every 8 hours as needed for constipation       lamoTRIgine (LAMICTAL) 100 MG tablet Take 100 mg by mouth 2 times daily       lisinopril (ZESTRIL) 5 MG tablet Take 5 mg by mouth daily       melatonin 3 MG tablet Take 1 tablet (3 mg) by mouth nightly as needed for sleep       metFORMIN (GLUCOPHAGE) 500 MG  tablet Take 500 mg by mouth 2 times daily (with meals)       methocarbamol (ROBAXIN) 500 MG tablet Take 1 tablet (500 mg) by mouth 3 times daily as needed for muscle spasms 60 tablet 0     miconazole (MICATIN) 2 % external powder Apply topically 2 times daily as needed To the skin fold       naloxone (NARCAN) 4 MG/0.1ML nasal spray Spray 1 spray (4 mg) into one nostril alternating nostrils as needed for opioid reversal every 2-3 minutes until assistance arrives 0.2 mL      nystatin (MYCOSTATIN) 860340 UNIT/ML suspension Take 5 mLs (500,000 Units) by mouth 4 times daily x7 days through 4/23       omeprazole (PRILOSEC) 20 MG DR capsule Take 20 mg by mouth daily       ondansetron (ZOFRAN ODT) 4 MG ODT tab Take 1 tablet (4 mg) by mouth every 6 hours as needed for nausea or vomiting       oxybutynin ER (DITROPAN XL) 15 MG 24 hr tablet Take 15 mg by mouth every morning       oxyCODONE (ROXICODONE) 5 MG tablet Take 1-2 tablets (5-10 mg) by mouth every 6 hours as needed for moderate pain 1 if pain rated 1-5 & 2 if 6-10. 30 tablet 0     polyethylene glycol (MIRALAX) 17 GM/Dose powder Take 17 g by mouth 2 times daily as needed for constipation 510 g PRN     predniSONE (DELTASONE) 1 MG tablet Take 1 mg by mouth every morning (In addition to the 5 mg dose; 1 mg + 5 mg = 6 mg)       predniSONE (DELTASONE) 5 MG tablet Take 5 mg by mouth every morning (In addition to the 1 mg dose; 5 mg + 1 mg = 6 mg)       pregabalin (LYRICA) 50 MG capsule Take 1 capsule (50 mg) by mouth 2 times daily 30 capsule 0     senna-docusate (SENOKOT-S/PERICOLACE) 8.6-50 MG tablet Take 1 tablet by mouth 2 times daily as needed for constipation       triamcinolone (KENALOG) 0.1 % external cream Apply topically 2 times daily as needed       triamterene-HCTZ (DYAZIDE) 37.5-25 MG capsule Take 1 capsule by mouth every morning Hold for SBP<110       vitamin D3 (CHOLECALCIFEROL) 50 mcg (2000 units) tablet Take 50 mcg by mouth daily       blood glucose (ACCU-CHEK  "FASTCLIX) lancing device FOR TESTING ONCE DAILY. DX  E11.9 TYPE 2 DIABETES       blood glucose (CONTOUR TEST) test strip TESTING EVERY DAY DX  E11.9       CONTOUR NEXT TEST test strip TESTING EVERY DAY DX  E11.9       order for DME Equipment being ordered: wrist brace 1 Device 0        Controlled medications:   Medication: oxycodone , 10 tabs given to patient at the time of discharge to take home; Medication: lyica , ok to send remaining tabs given to patient at the time of discharge to take home; Medication: diazepam , 0 tabs given to patient at the time of discharge to take home- as she has not used in TCU     Past Medical History:   Past Medical History:   Diagnosis Date     Abdominal pain      Anxiety      Arthritis      Asthma      Bipolar 1 disorder (H)      Chronic pain      Cochlear hydrops 01/01/1988    steriods and diazide     COPD (chronic obstructive pulmonary disease) (H)      Depression      Diabetes mellitus (H)      Dyspepsia      GERD (gastroesophageal reflux disease)      Hard of hearing     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  GENERAL APPEARANCE:  Alert, in NAD  HEENT: normocephalic, moist mucous membranes, nose without drainage or crusting, [place]  RESP:  respiratory effort normal, no respiratory distress, Lung sounds clear, patient is on RA  CV: auscultation of heart done, rate and rhythm regular.   ABDOMEN: + bowel sounds, soft, " nontender, no grimacing or guarding with palpation.  M/S: farrow wraps in place  NEURO: cranial nerves 2-12 grossly intact and at patient's baseline; no focal weakness  PSYCH: affect and mood normal      SNF labs: [facility]. Please refer to them using EPIC/Care Everywhere. and Recent labs in [facility] reviewed by me today.     DISCHARGE PLAN:    Follow up labs: BMP, CBC next week with PCP    Medical Follow Up:      Follow up with primary care provider in 1-2 weeks, Follow up with specialists per recommendations     Discharge Services: Outpatient therapy at [facility]    Discharge Instructions Verbalized to Patient at Discharge:     Monitor blood glucose monitoring 3 times a day. Keep a record and bring it to your next primary provider visit.     Continue to follow your diet:  Carbohydrate Controlled Diet.     Compression wraps on in AM, off in PM    Left upper extremity can get wet for bathing &washing hands. No dressings needed. Brace provided to use for transfers and w/c use ONLY. No brace with light activity and rest. Arm can be used in transfers while in brace. Patient should work with occupational and hand therapy on  strength and motion.    TOTAL DISCHARGE TIME:   Greater than 30 minutes  Electronically signed by:  JUAN ANTONIO Rose CNP

## 2023-04-18 ENCOUNTER — LAB REQUISITION (OUTPATIENT)
Dept: LAB | Facility: CLINIC | Age: 83
End: 2023-04-18
Payer: MEDICARE

## 2023-04-18 DIAGNOSIS — I10 ESSENTIAL (PRIMARY) HYPERTENSION: ICD-10-CM

## 2023-04-18 LAB — SARS-COV-2 RNA RESP QL NAA+PROBE: NEGATIVE

## 2023-04-24 ENCOUNTER — HOSPITAL ENCOUNTER (OUTPATIENT)
Dept: PHYSICAL THERAPY | Facility: CLINIC | Age: 83
Setting detail: THERAPIES SERIES
Discharge: HOME | End: 2023-04-24
Attending: NURSE PRACTITIONER
Payer: MEDICARE

## 2023-04-24 PROCEDURE — 97116 GAIT TRAINING THERAPY: CPT | Mod: GP | Performed by: PHYSICAL THERAPIST

## 2023-04-24 PROCEDURE — 97110 THERAPEUTIC EXERCISES: CPT | Mod: GP | Performed by: PHYSICAL THERAPIST

## 2023-04-24 PROCEDURE — 97750 PHYSICAL PERFORMANCE TEST: CPT | Mod: GP | Performed by: PHYSICAL THERAPIST

## 2023-05-01 ENCOUNTER — HOSPITAL ENCOUNTER (OUTPATIENT)
Dept: PHYSICAL THERAPY | Facility: CLINIC | Age: 83
Setting detail: THERAPIES SERIES
Discharge: HOME | End: 2023-05-01
Attending: INTERNAL MEDICINE
Payer: MEDICARE

## 2023-05-01 PROCEDURE — 97750 PHYSICAL PERFORMANCE TEST: CPT | Mod: GP | Performed by: PHYSICAL THERAPIST

## 2023-05-01 PROCEDURE — 97110 THERAPEUTIC EXERCISES: CPT | Mod: GP | Performed by: PHYSICAL THERAPIST

## 2023-05-01 PROCEDURE — 97116 GAIT TRAINING THERAPY: CPT | Mod: GP | Performed by: PHYSICAL THERAPIST

## 2023-05-08 ENCOUNTER — HOSPITAL ENCOUNTER (OUTPATIENT)
Dept: PHYSICAL THERAPY | Facility: CLINIC | Age: 83
Setting detail: THERAPIES SERIES
Discharge: HOME | End: 2023-05-08
Attending: INTERNAL MEDICINE
Payer: MEDICARE

## 2023-05-08 PROCEDURE — 97530 THERAPEUTIC ACTIVITIES: CPT | Mod: GP

## 2023-05-08 PROCEDURE — 97116 GAIT TRAINING THERAPY: CPT | Mod: GP

## 2023-05-18 ENCOUNTER — THERAPY VISIT (OUTPATIENT)
Dept: PHYSICAL THERAPY | Facility: CLINIC | Age: 83
End: 2023-05-18
Attending: PHYSICIAN ASSISTANT
Payer: MEDICARE

## 2023-05-18 DIAGNOSIS — R53.1 WEAKNESS: Primary | ICD-10-CM

## 2023-05-18 DIAGNOSIS — M48.00 SPINAL STENOSIS, UNSPECIFIED SPINAL REGION: ICD-10-CM

## 2023-05-18 PROCEDURE — 97110 THERAPEUTIC EXERCISES: CPT | Mod: GP

## 2023-05-22 ENCOUNTER — APPOINTMENT (OUTPATIENT)
Dept: CT IMAGING | Facility: CLINIC | Age: 83
End: 2023-05-22
Attending: EMERGENCY MEDICINE
Payer: MEDICARE

## 2023-05-22 ENCOUNTER — HOSPITAL ENCOUNTER (EMERGENCY)
Facility: CLINIC | Age: 83
Discharge: HOME | End: 2023-05-22
Attending: EMERGENCY MEDICINE | Admitting: EMERGENCY MEDICINE
Payer: MEDICARE

## 2023-05-22 VITALS
BODY MASS INDEX: 32.32 KG/M2 | DIASTOLIC BLOOD PRESSURE: 68 MMHG | HEART RATE: 72 BPM | SYSTOLIC BLOOD PRESSURE: 148 MMHG | HEIGHT: 65 IN | RESPIRATION RATE: 18 BRPM | WEIGHT: 194 LBS | OXYGEN SATURATION: 91 % | TEMPERATURE: 98.1 F

## 2023-05-22 DIAGNOSIS — K52.9 COLITIS: ICD-10-CM

## 2023-05-22 LAB
ALBUMIN SERPL-MCNC: 3.5 G/DL (ref 3.5–5)
ALBUMIN UR-MCNC: 20 MG/DL
ALP SERPL-CCNC: 81 U/L (ref 45–120)
ALT SERPL W P-5'-P-CCNC: 22 U/L (ref 0–45)
ANION GAP SERPL CALCULATED.3IONS-SCNC: 10 MMOL/L (ref 5–18)
APPEARANCE UR: CLEAR
AST SERPL W P-5'-P-CCNC: 18 U/L (ref 0–40)
BASOPHILS # BLD AUTO: 0 10E3/UL (ref 0–0.2)
BASOPHILS NFR BLD AUTO: 1 %
BILIRUB DIRECT SERPL-MCNC: 0.1 MG/DL
BILIRUB SERPL-MCNC: 0.4 MG/DL (ref 0–1)
BILIRUB UR QL STRIP: NEGATIVE
BUN SERPL-MCNC: 16 MG/DL (ref 8–28)
CALCIUM SERPL-MCNC: 8.9 MG/DL (ref 8.5–10.5)
CHLORIDE BLD-SCNC: 100 MMOL/L (ref 98–107)
CO2 SERPL-SCNC: 28 MMOL/L (ref 22–31)
COLOR UR AUTO: YELLOW
CREAT SERPL-MCNC: 0.9 MG/DL (ref 0.6–1.1)
EOSINOPHIL # BLD AUTO: 0.3 10E3/UL (ref 0–0.7)
EOSINOPHIL NFR BLD AUTO: 3 %
ERYTHROCYTE [DISTWIDTH] IN BLOOD BY AUTOMATED COUNT: 18.3 % (ref 10–15)
GFR SERPL CREATININE-BSD FRML MDRD: 64 ML/MIN/1.73M2
GLUCOSE BLD-MCNC: 127 MG/DL (ref 70–125)
GLUCOSE UR STRIP-MCNC: >1000 MG/DL
HCT VFR BLD AUTO: 35.6 % (ref 35–47)
HGB BLD-MCNC: 9.7 G/DL (ref 11.7–15.7)
HGB UR QL STRIP: NEGATIVE
IMM GRANULOCYTES # BLD: 0 10E3/UL
IMM GRANULOCYTES NFR BLD: 1 %
KETONES UR STRIP-MCNC: NEGATIVE MG/DL
LEUKOCYTE ESTERASE UR QL STRIP: ABNORMAL
LIPASE SERPL-CCNC: 14 U/L (ref 0–52)
LYMPHOCYTES # BLD AUTO: 3.2 10E3/UL (ref 0.8–5.3)
LYMPHOCYTES NFR BLD AUTO: 38 %
MCH RBC QN AUTO: 21.6 PG (ref 26.5–33)
MCHC RBC AUTO-ENTMCNC: 27.2 G/DL (ref 31.5–36.5)
MCV RBC AUTO: 79 FL (ref 78–100)
MONOCYTES # BLD AUTO: 0.6 10E3/UL (ref 0–1.3)
MONOCYTES NFR BLD AUTO: 7 %
MUCOUS THREADS #/AREA URNS LPF: PRESENT /LPF
NEUTROPHILS # BLD AUTO: 4.4 10E3/UL (ref 1.6–8.3)
NEUTROPHILS NFR BLD AUTO: 50 %
NITRATE UR QL: NEGATIVE
NRBC # BLD AUTO: 0 10E3/UL
NRBC BLD AUTO-RTO: 0 /100
PH UR STRIP: 5.5 [PH] (ref 5–7)
PLATELET # BLD AUTO: 270 10E3/UL (ref 150–450)
POTASSIUM BLD-SCNC: 3.4 MMOL/L (ref 3.5–5)
PROT SERPL-MCNC: 6.7 G/DL (ref 6–8)
RBC # BLD AUTO: 4.49 10E6/UL (ref 3.8–5.2)
RBC URINE: 3 /HPF
SODIUM SERPL-SCNC: 138 MMOL/L (ref 136–145)
SP GR UR STRIP: 1.03 (ref 1–1.03)
SQUAMOUS EPITHELIAL: 4 /HPF
UROBILINOGEN UR STRIP-MCNC: <2 MG/DL
WBC # BLD AUTO: 8.5 10E3/UL (ref 4–11)
WBC URINE: 8 /HPF

## 2023-05-22 PROCEDURE — 82310 ASSAY OF CALCIUM: CPT | Performed by: EMERGENCY MEDICINE

## 2023-05-22 PROCEDURE — 85004 AUTOMATED DIFF WBC COUNT: CPT | Performed by: EMERGENCY MEDICINE

## 2023-05-22 PROCEDURE — 36415 COLL VENOUS BLD VENIPUNCTURE: CPT | Performed by: EMERGENCY MEDICINE

## 2023-05-22 PROCEDURE — 82248 BILIRUBIN DIRECT: CPT | Performed by: EMERGENCY MEDICINE

## 2023-05-22 PROCEDURE — 96374 THER/PROPH/DIAG INJ IV PUSH: CPT | Mod: 59

## 2023-05-22 PROCEDURE — G1010 CDSM STANSON: HCPCS

## 2023-05-22 PROCEDURE — 99285 EMERGENCY DEPT VISIT HI MDM: CPT | Mod: 25

## 2023-05-22 PROCEDURE — 250N000011 HC RX IP 250 OP 636: Performed by: EMERGENCY MEDICINE

## 2023-05-22 PROCEDURE — 81001 URINALYSIS AUTO W/SCOPE: CPT | Performed by: EMERGENCY MEDICINE

## 2023-05-22 PROCEDURE — 83690 ASSAY OF LIPASE: CPT | Performed by: EMERGENCY MEDICINE

## 2023-05-22 RX ORDER — HYDROMORPHONE HYDROCHLORIDE 1 MG/ML
0.5 INJECTION, SOLUTION INTRAMUSCULAR; INTRAVENOUS; SUBCUTANEOUS ONCE
Status: COMPLETED | OUTPATIENT
Start: 2023-05-22 | End: 2023-05-22

## 2023-05-22 RX ORDER — IOPAMIDOL 755 MG/ML
100 INJECTION, SOLUTION INTRAVASCULAR ONCE
Status: COMPLETED | OUTPATIENT
Start: 2023-05-22 | End: 2023-05-22

## 2023-05-22 RX ADMIN — IOPAMIDOL 100 ML: 755 INJECTION, SOLUTION INTRAVENOUS at 13:58

## 2023-05-22 RX ADMIN — HYDROMORPHONE HYDROCHLORIDE 0.5 MG: 1 INJECTION, SOLUTION INTRAMUSCULAR; INTRAVENOUS; SUBCUTANEOUS at 13:33

## 2023-05-22 ASSESSMENT — ACTIVITIES OF DAILY LIVING (ADL): ADLS_ACUITY_SCORE: 33

## 2023-05-22 NOTE — ED PROVIDER NOTES
EMERGENCY DEPARTMENT ENCOUNTER      NAME: Jumana Yang  AGE: 82 year old female  YOB: 1940  MRN: [id number]  EVALUATION DATE & TIME: 2023 12:09 PM    PCP: Ananya Mclean    ED PROVIDER: David Pace D.O.      Chief Complaint   Patient presents with     Abdominal Pain     Diarrhea     Generalized Weakness       FINAL IMPRESSION:  1. Colitis        ED COURSE & MEDICAL DECISION MAKIN:22 PM I met with the patient to gather history and to perform my initial exam. I discussed the plan for care while in the Emergency Department.  2:30 PM I updated patient. Discharge discussed.         Pertinent Labs & Imaging studies reviewed. (See chart for details)  82 year old female presents to the Emergency Department for evaluation of multiple episodes of diarrhea and left-sided abdominal pain.  Initial differential could include colitis, diverticulitis, mesenteric ischemia, other emergent process.  Most likely infectious in nature based on clinical exam and history.  Patient is not showing signs of sepsis.  Lab testing is largely unremarkable however the patient was unable to give us a stool sample for culture or C. difficile.  CT imaging did not show any evidence of diverticulitis, but is consistent with colitis.  No evidence of surgical abdomen.  Based on clinical picture I do believe it would be prudent to discharge this patient on Augmentin and have follow-up as an outpatient with her primary care provider.  Patient was comfortable with this plan.  Return precautions were discussed    Medical Decision Making    History:    Supplemental history from: Documented in chart, if applicable    External Record(s) reviewed: Documented in chart, if applicable.    Work Up:    Chart documentation includes differential considered and any EKGs or imaging independently interpreted by provider, where specified.    In additional to work up documented, I considered the following work up: Documented in  "chart, if applicable.    External consultation:    Discussion of management with another provider: Documented in chart, if applicable    Complicating factors:    Care impacted by chronic illness: Other: IBS    Care affected by social determinants of health: N/A    Disposition considerations: Discharge. I prescribed additional prescription strength medication(s) as charted. I considered admission, but discharged the patient after share decision making conversation.        At the conclusion of the encounter I discussed the results of all of the tests and the disposition. The questions were answered. The patient or family acknowledged understanding and was agreeable with the care plan.        HPI    Patient information was obtained from: Patient, Son, & PCA    Use of : N/A       Jumana Yang is a 82 year old female who presents with abdominal pain. Patient states that on 5/18 (~4 days ago) after a PT appointment she had sudden onset of lower abdominal pain. She notes that she has been unable to eat anything because it all \"comes out\". Endorses diarrhea, nausea, and tremors. Denies vomiting, fever, chest pain, shortness of breath, and bloody stool.    Per Chart Review: 3/30 (~2 months ago) at [facility] for drug induced constipation. Discharged in stable condition on 4/1 (~2 months ago).      REVIEW OF SYSTEMS  Constitutional:  Denies fever, chills, weight loss or weakness  Eyes:  No pain, discharge, redness  HENT:  Denies sore throat, ear pain, congestion  Respiratory: No SOB, wheeze or cough  Cardiovascular:  No CP, palpitations  GI:  Denies vomiting. Endorses abdominal pain (lower), nausea, and diarrhea.  : Denies dysuria, hematuria  Musculoskeletal:  Denies any new muscle/joint pain, swelling or loss of function.  Skin:  Denies rash, pallor  Neurologic:  Denies headache, focal weakness or sensory changes. Endorses tremors.  Lymph: Denies swollen nodes    All other systems negative unless noted in " HPI.    PAST MEDICAL HISTORY:  Past Medical History:   Diagnosis Date     Abdominal pain      Anxiety      Arthritis      Asthma      Bipolar 1 disorder (H)      Chronic pain      Cochlear hydrops 01/01/1988    steriods and diazide     COPD (chronic obstructive pulmonary disease) (H)      Depression      Diabetes mellitus (H)      Dyspepsia      GERD (gastroesophageal reflux disease)      Hard of hearing     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     Hepatic abscess      Hyperlipidemia      Hypertension      Hypothyroidism      Irritable bowel syndrome      Meniere disease      Neuropathy      Noninfectious ileitis      Peritoneal abscess (H)      Spinal stenosis of lumbar region      Steroid long-term use      Vaginitis, atrophic, postmenopausal      Vitamin D deficiency        PAST SURGICAL HISTORY:  Past Surgical History:   Procedure Laterality Date     CATARACT IOL, RT/LT Left 7/2013     FOOT SURGERY      toe     GI SURGERY       IR LUMBAR/SACRAL TRANSFOR INJ BILATERAL Bilateral 3/11/2022     LAPAROSCOPIC HEPATECTOMY PARTIAL  11/4/2013    Procedure: LAPAROSCOPIC HEPATECTOMY PARTIAL;  Laparoscopic Debridement of Liver Abcess;  Surgeon: Sepideh Green MD;  Location: [facility] OR     ORTHOPEDIC SURGERY       PICC INSERTION  8/28/2013    5fr DL Power PICC, 41cm (1cm external length) in the R lateral brachial vein with tip in the SVC RA junction.     RECTAL SURGERY      1970s     RELEASE CARPAL TUNNEL Left 3/21/2023    Procedure: LEFT OPEN CARPAL TUNNEL RELEASE. LEFT RING FINGER A1 PULLEY RELEASE;  Surgeon: Claire Hamilton MD;  Location: [facility] OR     VASCULAR SURGERY           CURRENT MEDICATIONS:    No current facility-administered medications for this encounter.     Current Outpatient Medications   Medication     amoxicillin-clavulanate (AUGMENTIN) 875-125 MG tablet     acetaminophen (TYLENOL) 325 MG tablet     blood glucose (ACCU-CHEK FASTCLIX) lancing device     blood glucose (CONTOUR TEST) test  strip     busPIRone (BUSPAR) 7.5 MG tablet     calamine 8-8 % lotion     clobetasol (TEMOVATE) 0.05 % external cream     CONTOUR NEXT TEST test strip     cyanocobalamin 1000 MCG SUBL     diazepam (VALIUM) 2 MG tablet     DULoxetine (CYMBALTA) 60 MG capsule     glipiZIDE (GLUCOTROL) 5 MG tablet     hydrOXYzine (ATARAX) 25 MG tablet     JARDIANCE 25 MG TABS tablet     lactulose (CHRONULAC) 10 GM/15ML solution     lamoTRIgine (LAMICTAL) 100 MG tablet     lisinopril (ZESTRIL) 5 MG tablet     melatonin 3 MG tablet     metFORMIN (GLUCOPHAGE) 500 MG tablet     methocarbamol (ROBAXIN) 500 MG tablet     miconazole (MICATIN) 2 % external powder     naloxone (NARCAN) 4 MG/0.1ML nasal spray     nystatin (MYCOSTATIN) 641622 UNIT/ML suspension     omeprazole (PRILOSEC) 20 MG DR capsule     ondansetron (ZOFRAN ODT) 4 MG ODT tab     order for DME     oxybutynin ER (DITROPAN XL) 15 MG 24 hr tablet     oxyCODONE (ROXICODONE) 5 MG tablet     polyethylene glycol (MIRALAX) 17 GM/Dose powder     predniSONE (DELTASONE) 1 MG tablet     predniSONE (DELTASONE) 5 MG tablet     pregabalin (LYRICA) 50 MG capsule     senna-docusate (SENOKOT-S/PERICOLACE) 8.6-50 MG tablet     triamcinolone (KENALOG) 0.1 % external cream     triamterene-HCTZ (DYAZIDE) 37.5-25 MG capsule     vitamin D3 (CHOLECALCIFEROL) 50 mcg (2000 units) tablet         ALLERGIES:  Allergies   Allergen Reactions     Propofol Nausea and Vomiting     Shellfish Allergy      Other reaction(s): Dizziness     Capsaicin Rash and Blisters       FAMILY HISTORY:  Family History   Problem Relation Age of Onset     Cerebrovascular Disease Mother      Heart Disease Mother      Heart Disease Father      Heart Disease Brother      Diabetes No family hx of      Coronary Artery Disease No family hx of      Hypertension No family hx of      Hyperlipidemia No family hx of      Breast Cancer No family hx of      Colon Cancer No family hx of      Prostate Cancer No family hx of      Other Cancer No  "family hx of      Depression No family hx of      Anxiety Disorder No family hx of      Mental Illness No family hx of      Substance Abuse No family hx of      Anesthesia Reaction No family hx of      Asthma No family hx of      Osteoporosis No family hx of      Genetic Disorder No family hx of      Thyroid Disease No family hx of      Obesity No family hx of      Unknown/Adopted No family hx of        SOCIAL HISTORY:  Social History     Socioeconomic History     Marital status:    Tobacco Use     Smoking status: Former     Types: Cigarettes     Quit date: 11/15/1987     Years since quittin.5     Smokeless tobacco: Former   Substance and Sexual Activity     Alcohol use: Not Currently     Alcohol/week: 0.0 standard drinks of alcohol     Comment: MALISSA white since      Drug use: No     Comment: used marijuanna in the past     Sexual activity: Never     Partners: Male   Other Topics Concern     Parent/sibling w/ CABG, MI or angioplasty before 65F 55M? No   Social History Narrative    ** Merged History Encounter **            VITALS:  Patient Vitals for the past 24 hrs:   BP Temp Pulse Resp SpO2 Height Weight   23 1415 -- -- 72 -- -- -- --   23 1408 (!) 148/68 -- 73 -- 91 % -- --   23 1145 -- -- -- -- -- 1.651 m (5' 5\") 88 kg (194 lb)   23 1139 128/53 98.1  F (36.7  C) 72 18 94 % -- --       PHYSICAL EXAM    VITAL SIGNS: BP (!) 148/68   Pulse 72   Temp 98.1  F (36.7  C)   Resp 18   Ht 1.651 m (5' 5\")   Wt 88 kg (194 lb)   LMP  (LMP Unknown)   SpO2 91%   BMI 32.28 kg/m      General Appearance: Well-appearing, well-nourished, no acute distress  Head:  Normocephalic, without obvious abnormality, atraumatic  Eyes:  PERRL, conjunctiva/corneas clear, EOM's intact,  ENT:  Lips, mucosa, and tongue normal, membranes are moist without pallor  Neck:  Normal ROM, symmetrical, trachea midline    Cardio:  Regular rate and rhythm, no murmur, rub or gallop, 2+ pulses symmetric in all " extremities  Pulm:  Clear to auscultation bilaterally, respirations unlabored,  Abdomen:  Soft, LUQ and LLQ tenderness, no rebound or guarding.  Musculoskeletal: Full ROM, no edema, no cyanosis, good ROM of major joints  Integument:  Warm, Dry, No erythema, No rash.    Neurologic:  Alert & oriented.  No focal deficits appreciated.  Ambulatory.  Psychiatric:  Affect normal, Judgment normal, Mood normal.      LABS  Results for orders placed or performed during the hospital encounter of 05/22/23 (from the past 24 hour(s))   UA with Microscopic reflex to Culture    Specimen: Urine, NOS   Result Value Ref Range    Color Urine Yellow Colorless, Straw, Light Yellow, Yellow    Appearance Urine Clear Clear    Glucose Urine >1000 (A) Negative mg/dL    Bilirubin Urine Negative Negative    Ketones Urine Negative Negative mg/dL    Specific Gravity Urine 1.034 (H) 1.001 - 1.030    Blood Urine Negative Negative    pH Urine 5.5 5.0 - 7.0    Protein Albumin Urine 20 (A) Negative mg/dL    Urobilinogen Urine <2.0 <2.0 mg/dL    Nitrite Urine Negative Negative    Leukocyte Esterase Urine 75 Angy/uL (A) Negative    Mucus Urine Present (A) None Seen /LPF    RBC Urine 3 (H) <=2 /HPF    WBC Urine 8 (H) <=5 /HPF    Squamous Epithelials Urine 4 (H) <=1 /HPF    Narrative    Urine Culture not indicated   Basic metabolic panel   Result Value Ref Range    Sodium 138 136 - 145 mmol/L    Potassium 3.4 (L) 3.5 - 5.0 mmol/L    Chloride 100 98 - 107 mmol/L    Carbon Dioxide (CO2) 28 22 - 31 mmol/L    Anion Gap 10 5 - 18 mmol/L    Urea Nitrogen 16 8 - 28 mg/dL    Creatinine 0.90 0.60 - 1.10 mg/dL    Calcium 8.9 8.5 - 10.5 mg/dL    Glucose 127 (H) 70 - 125 mg/dL    GFR Estimate 64 >60 mL/min/1.73m2   Hepatic function panel   Result Value Ref Range    Bilirubin Total 0.4 0.0 - 1.0 mg/dL    Bilirubin Direct 0.1 <=0.5 mg/dL    Protein Total 6.7 6.0 - 8.0 g/dL    Albumin 3.5 3.5 - 5.0 g/dL    Alkaline Phosphatase 81 45 - 120 U/L    AST 18 0 - 40 U/L    ALT  22 0 - 45 U/L   Lipase   Result Value Ref Range    Lipase 14 0 - 52 U/L   CBC with platelets + differential    Narrative    The following orders were created for panel order CBC with platelets + differential.  Procedure                               Abnormality         Status                     ---------                               -----------         ------                     CBC with platelets and d...[[id number]]  Abnormal            Final result                 Please view results for these tests on the individual orders.   CBC with platelets and differential   Result Value Ref Range    WBC Count 8.5 4.0 - 11.0 10e3/uL    RBC Count 4.49 3.80 - 5.20 10e6/uL    Hemoglobin 9.7 (L) 11.7 - 15.7 g/dL    Hematocrit 35.6 35.0 - 47.0 %    MCV 79 78 - 100 fL    MCH 21.6 (L) 26.5 - 33.0 pg    MCHC 27.2 (L) 31.5 - 36.5 g/dL    RDW 18.3 (H) 10.0 - 15.0 %    Platelet Count 270 150 - 450 10e3/uL    % Neutrophils 50 %    % Lymphocytes 38 %    % Monocytes 7 %    % Eosinophils 3 %    % Basophils 1 %    % Immature Granulocytes 1 %    NRBCs per 100 WBC 0 <1 /100    Absolute Neutrophils 4.4 1.6 - 8.3 10e3/uL    Absolute Lymphocytes 3.2 0.8 - 5.3 10e3/uL    Absolute Monocytes 0.6 0.0 - 1.3 10e3/uL    Absolute Eosinophils 0.3 0.0 - 0.7 10e3/uL    Absolute Basophils 0.0 0.0 - 0.2 10e3/uL    Absolute Immature Granulocytes 0.0 <=0.4 10e3/uL    Absolute NRBCs 0.0 10e3/uL   CT Abdomen Pelvis w Contrast    Narrative    EXAM: CT ABDOMEN PELVIS W CONTRAST  LOCATION: [facility]  DATE/TIME: 5/22/2023 2:03 PM CDT    INDICATION: Left sided abdominal pain  COMPARISON: CT of the abdomen and pelvis 11/03/2022  TECHNIQUE: CT scan of the abdomen and pelvis was performed following injection of IV contrast. Multiplanar reformats were obtained. Dose reduction techniques were used.  CONTRAST: ISOVUE 370 100mL    FINDINGS:   LOWER CHEST: Normal.    HEPATOBILIARY: Diffuse hepatic steatosis. No significant mass. No bile duct  dilatation. No calcified gallstones.    PANCREAS: Normal.    SPLEEN: Normal.    ADRENAL GLANDS: Normal.    KIDNEYS/BLADDER: No significant mass, stones, or hydronephrosis. There are simple or benign cysts. No follow up is needed.    BOWEL: Stomach is decompressed. Small bowel is normal. Normal appendix. The ascending colon is normal and most of the transverse colon is decompressed. The descending colon has mucosal hyperenhancement and subtle wall thickening. The sigmoid colon has   more extensive mucosal enhancement and wall thickening in addition to numerous noninflamed diverticula. No mesenteric inflammatory stranding. No pneumatosis or pneumoperitoneum. No ascites.    LYMPH NODES: Normal.    VASCULATURE: Circumaortic left renal vein. Mild, patchy aortoiliac atheromatous calcifications.    PELVIC ORGANS: Calcified fundal uterine fibroid to the right.    MUSCULOSKELETAL: Mild/moderate lumbar disc space narrowing greatest at L5-S1.      Impression    IMPRESSION:     1.  Long segment mucosal hyperenhancement and wall thickening of the descending and sigmoid colon consistent with acute colitis. There are also numerous sigmoid diverticula but none of the diverticula are specifically enlarged/inflamed.         RADIOLOGY  CT Abdomen Pelvis w Contrast   Final Result   IMPRESSION:       1.  Long segment mucosal hyperenhancement and wall thickening of the descending and sigmoid colon consistent with acute colitis. There are also numerous sigmoid diverticula but none of the diverticula are specifically enlarged/inflamed.             MEDICATIONS GIVEN IN THE EMERGENCY:  Medications   HYDROmorphone (PF) (DILAUDID) injection 0.5 mg (0.5 mg Intravenous $Given 5/22/23 1333)   iopamidol (ISOVUE-370) solution 100 mL (100 mLs Intravenous $Given 5/22/23 1358)       NEW PRESCRIPTIONS STARTED AT TODAY'S ER VISIT  New Prescriptions    AMOXICILLIN-CLAVULANATE (AUGMENTIN) 875-125 MG TABLET    Take 1 tablet by mouth 2 times daily for 7  days        I, Miranda Marquez, am serving as a scribe to document services personally performed by David Pace D.O., based on my observations and the provider's statements to me.  I, David Pace D.O., attest that Miranda Marquez is acting in a scribe capacity, has observed my performance of the services and has documented them in accordance with my direction.     David Pace D.O.  Emergency Medicine  [facility] EMERGENCY ROOM  [facility] [facility] [id number]  [phone]  Dept: [phone]     David Pace, DO  05/22/23 1457

## 2023-05-22 NOTE — ED TRIAGE NOTES
Pt here with abdominal pain that started Thursday. Pt reports history of irritable bowel syndrome. Reports very dark and watery diarrhea. Pt feels very weak and diarrhea. States every time she eats she has a loose BM.

## 2023-05-30 ENCOUNTER — THERAPY VISIT (OUTPATIENT)
Dept: PHYSICAL THERAPY | Facility: CLINIC | Age: 83
End: 2023-05-30
Attending: INTERNAL MEDICINE
Payer: MEDICARE

## 2023-05-30 DIAGNOSIS — M54.42 CHRONIC LEFT-SIDED LOW BACK PAIN WITH LEFT-SIDED SCIATICA: Primary | ICD-10-CM

## 2023-05-30 DIAGNOSIS — G89.29 CHRONIC LEFT-SIDED LOW BACK PAIN WITH LEFT-SIDED SCIATICA: Primary | ICD-10-CM

## 2023-05-30 DIAGNOSIS — R60.0 BILATERAL LEG EDEMA: ICD-10-CM

## 2023-05-30 PROCEDURE — 97116 GAIT TRAINING THERAPY: CPT | Mod: GP | Performed by: PHYSICAL THERAPIST

## 2023-05-30 PROCEDURE — 97530 THERAPEUTIC ACTIVITIES: CPT | Mod: GP | Performed by: PHYSICAL THERAPIST

## 2023-05-30 PROCEDURE — 97110 THERAPEUTIC EXERCISES: CPT | Mod: GP | Performed by: PHYSICAL THERAPIST

## 2023-06-09 ENCOUNTER — THERAPY VISIT (OUTPATIENT)
Dept: PHYSICAL THERAPY | Facility: CLINIC | Age: 83
End: 2023-06-09
Attending: PHYSICIAN ASSISTANT
Payer: MEDICARE

## 2023-06-09 DIAGNOSIS — R53.1 WEAKNESS: Primary | ICD-10-CM

## 2023-06-09 DIAGNOSIS — M48.00 SPINAL STENOSIS, UNSPECIFIED SPINAL REGION: ICD-10-CM

## 2023-06-09 PROCEDURE — 97110 THERAPEUTIC EXERCISES: CPT | Mod: GP

## 2023-06-14 ENCOUNTER — THERAPY VISIT (OUTPATIENT)
Dept: PHYSICAL THERAPY | Facility: CLINIC | Age: 83
End: 2023-06-14
Attending: PHYSICIAN ASSISTANT
Payer: MEDICARE

## 2023-06-14 DIAGNOSIS — R53.1 WEAKNESS: Primary | ICD-10-CM

## 2023-06-14 DIAGNOSIS — G89.29 CHRONIC LEFT-SIDED LOW BACK PAIN WITH LEFT-SIDED SCIATICA: ICD-10-CM

## 2023-06-14 DIAGNOSIS — M48.00 SPINAL STENOSIS, UNSPECIFIED SPINAL REGION: ICD-10-CM

## 2023-06-14 DIAGNOSIS — M54.42 CHRONIC LEFT-SIDED LOW BACK PAIN WITH LEFT-SIDED SCIATICA: ICD-10-CM

## 2023-06-14 PROCEDURE — 97530 THERAPEUTIC ACTIVITIES: CPT | Mod: GP | Performed by: PHYSICAL THERAPIST

## 2023-06-14 PROCEDURE — 97116 GAIT TRAINING THERAPY: CPT | Mod: GP | Performed by: PHYSICAL THERAPIST

## 2023-06-15 ENCOUNTER — THERAPY VISIT (OUTPATIENT)
Dept: PHYSICAL THERAPY | Facility: CLINIC | Age: 83
End: 2023-06-15
Attending: PHYSICIAN ASSISTANT
Payer: MEDICARE

## 2023-06-15 DIAGNOSIS — M48.00 SPINAL STENOSIS, UNSPECIFIED SPINAL REGION: ICD-10-CM

## 2023-06-15 DIAGNOSIS — R53.1 WEAKNESS: Primary | ICD-10-CM

## 2023-06-15 PROCEDURE — 97110 THERAPEUTIC EXERCISES: CPT | Mod: GP

## 2023-06-15 PROCEDURE — 97530 THERAPEUTIC ACTIVITIES: CPT | Mod: GP

## 2023-06-16 NOTE — PROGRESS NOTES
06/15/23 0500   Appointment Info   Signing clinician's name / credentials Maximus Mcknight DPT   Visits Used 9   Medical Diagnosis Falls frequently (R29.6)  - Primary Weakness (R53.1)   PT Tx Diagnosis Force production deficit, sensory selection and awaiting deficits   Precautions/Limitations Falls   Progress Note/Certification   Start of Care Date 03/20/23   Onset of illness/injury or Date of Surgery 02/08/23   Therapy Frequency 1x/week   Predicted Duration 90 days   Certification date from 03/20/23   Certification date to 06/18/23       Present No    ID Pt able to hear therapist when he speaks loudly, articulately, and faces the patient directly.   GOALS   PT Goals 2;3;4;5;6   PT Goal 1   Goal Identifier walking   Goal Description Pt amb 50 feet mod I with FWW to independently amb home distances.   Rationale to maximize safety and independence within the home   Goal Progress Ongoing   Target Date 09/12/23   PT Goal 2   Goal Identifier Balance   Goal Description Patient will perform at least 20 seconds in all conditions of the mCTSIB.  Patient will be able to perform at least 5 sit to stands with no use of her hands in 30 seconds.   Rationale to maximize safety and independence within the home   Goal Progress 6/15 - 3 sec total on mCTSIB. 5xSTS 39.19 sec on 6/14/23. 4/24/23 very nervous to perform any standing without upper extremity support.   Target Date 09/12/23   PT Goal 3   Goal Identifier Falls   Goal Description Patient will verbalize at least 3 fall prevention tactics she can use at home, and demonstrated no falls or near falls x at least 2 weeks.   Rationale to maximize safety and independence within the home   Goal Progress Ongoing   Target Date 09/12/23   PT Goal 4   Goal Identifier TUG   Goal Description Pt will improve TUG time to <45 sec to demonstrate significant functional improvement in gait speed to improve independence with mobility around her house.   Rationale  to maximize safety and independence within the home   Goal Progress Ongoing   Target Date 09/12/23   PT Goal 5   Goal Identifier HEP   Goal Description Independently demo HEP with handout for seated BLE strength and core strength to continue to progress functional strnegth in BLE for transfers and gait.   Rationale to maximize safety and independence within the home   Goal Progress Ongoing   Target Date 09/12/23   Subjective Report   Subjective Report Pt reports doing some exercises at home ie STSs with assist. Has not been doing the seated strengthening exercises. Reports feeling weaker today, continued swelling in BLE (velcro wraps donned); however, pt able to perform more today than prior 2 sessions.   Objective Measures   Objective Measures Objective Measure 3;Objective Measure 4;Objective Measure 5   Objective Measure 3   Objective Measure mCTSIB   Details 3. Only able to stand 3 sec feet together before needing to hold therapist for support.   Objective Measure 4   Objective Measure TUG   Details 1:09 (performed 6/14) with FWW   Objective Measure 5   Objective Measure 5xSTS   Details 39.19 sec FWW modA from w/c   Therapeutic Procedure/Exercise   Therapeutic Procedures: strength, endurance, ROM, flexibillity minutes (10836) 25   Ther Proc 1 Ambulation for endurance   Ther Proc 1 - Details Amb 55'x2, FWW with padding on handles, CGA   PTRx Ther Proc 2 Seated Hip Abduction with Band   PTRx Ther Proc 2 - Details Performed 15x2 B yellow band   PTRx Ther Proc 3 Short Arc Knee Extension   PTRx Ther Proc 3 - Details Performed yellow band 15x2 B   PTRx Ther Proc 5 Seated Hamstring Curl with Tubing   PTRx Ther Proc 5 - Details Performed yellow band 15x2 B   Therapeutic Activity   Therapeutic Activities: dynamic activities to improve functional performance minutes (84808) 14   Ther Act 1 Care planning   Ther Act 1 - Details Reviewed goals for PT and progress thus far. Extra time needed for pt to understand therapist.    Ther Act 2 Outcome Measures   Ther Act 2 - Details mCTSIB   Therapeutic Activities Ther Act 2;Ther Act 3   Education   Learner/Method Patient   Plan   Homework HEP   Home program PTRx - standing mini squats, hip abd seated yellow band, seated LAQ yellow band   Plan for next session Trial SI belt? Continue with progressing circuits (amb FWW 60 ft, seated BLE strengthening 10 reps x 3 of each with therabands)   Comments   Comments Noting palpable clunking in L SIJ in mid to late stance during gait, maybe SI belt could help stabilize?   Total Session Time   Timed Code Treatment Minutes 39   Total Treatment Time (sum of timed and untimed services) 39         PLAN  Continue therapy per current plan of care.    Beginning/End Dates of Progress Note Reporting Period:    3/20/2023 to 06/15/2023    Referring Provider:  Caitlin LOPEZ [facility]                                                                                   OUTPATIENT PHYSICAL THERAPY      PLAN OF TREATMENT FOR OUTPATIENT REHABILITATION   Patient's Last Name, First Name, Jumana Fafran YOB: 1940   Provider's Name   [facility]   Medical Record No.  [id number]     Onset Date: 02/08/23  Start of Care Date: 03/20/23     Medical Diagnosis:  Falls frequently (R29.6)  - Primary Weakness (R53.1)      PT Treatment Diagnosis:  Force production deficit, sensory selection and awaiting deficits Plan of Treatment  Frequency/Duration: 1x/week/ 90 days    Certification date from 06/15/23 to 09/12/23         See note for plan of treatment details and functional goals     Alexis Mcknight, PT                         I CERTIFY THE NEED FOR THESE SERVICES FURNISHED UNDER        THIS PLAN OF TREATMENT AND WHILE UNDER MY CARE     (Physician attestation of this document indicates review and certification of the therapy plan).                  Referring Provider:  Caitlin Bang  Shi      Initial Assessment  See Epic Evaluation- Start of Care Date: 03/20/23

## 2023-06-19 ENCOUNTER — THERAPY VISIT (OUTPATIENT)
Dept: PHYSICAL THERAPY | Facility: CLINIC | Age: 83
End: 2023-06-19
Attending: INTERNAL MEDICINE
Payer: MEDICARE

## 2023-06-19 DIAGNOSIS — G89.29 CHRONIC LEFT-SIDED LOW BACK PAIN WITH LEFT-SIDED SCIATICA: ICD-10-CM

## 2023-06-19 DIAGNOSIS — R53.1 WEAKNESS: Primary | ICD-10-CM

## 2023-06-19 DIAGNOSIS — M54.42 CHRONIC LEFT-SIDED LOW BACK PAIN WITH LEFT-SIDED SCIATICA: ICD-10-CM

## 2023-06-19 DIAGNOSIS — M48.00 SPINAL STENOSIS, UNSPECIFIED SPINAL REGION: ICD-10-CM

## 2023-06-19 DIAGNOSIS — H90.3 BILATERAL SENSORINEURAL HEARING LOSS: Primary | ICD-10-CM

## 2023-06-19 DIAGNOSIS — H93.13 TINNITUS, BILATERAL: ICD-10-CM

## 2023-06-19 PROCEDURE — 97116 GAIT TRAINING THERAPY: CPT | Mod: GP | Performed by: PHYSICAL THERAPIST

## 2023-06-20 ENCOUNTER — OFFICE VISIT (OUTPATIENT)
Dept: AUDIOLOGY | Facility: CLINIC | Age: 83
End: 2023-06-20
Attending: OTOLARYNGOLOGY
Payer: MEDICARE

## 2023-06-20 DIAGNOSIS — H93.13 TINNITUS, BILATERAL: ICD-10-CM

## 2023-06-20 DIAGNOSIS — H90.3 BILATERAL SENSORINEURAL HEARING LOSS: ICD-10-CM

## 2023-06-20 PROCEDURE — V5264 EAR MOLD/INSERT: HCPCS | Mod: GY | Performed by: AUDIOLOGIST

## 2023-06-20 PROCEDURE — 92557 COMPREHENSIVE HEARING TEST: CPT | Mod: 52 | Performed by: AUDIOLOGIST

## 2023-06-20 PROCEDURE — V5266 BATTERY FOR HEARING DEVICE: HCPCS | Mod: GY | Performed by: AUDIOLOGIST

## 2023-06-20 PROCEDURE — 92567 TYMPANOMETRY: CPT | Performed by: AUDIOLOGIST

## 2023-06-20 NOTE — PROGRESS NOTES
AUDIOLOGY REPORT    SUMMARY: Audiology visit completed. See audiogram for results.  Offered CART service, but patient declined. Patient understands speech well when spoken slowly with visual cues.    Patient brings 2 hearing aids indicating that one is not working (plugged with wax) and the tubing on her primary hearing aid is too short and is hard. Hearing aids cleaned by audiology assistant. Tonya was fit with an earmold that she has not picked up. Tubing was trimmed to the appropriate length and patient reported satisfaction with the length. Patient is given old earmold to use as a back up. Provided 4 packs of size 13 batteries.     RECOMMENDATIONS: Patient indicated that she would like to cancel her appointment with ENT as she is choosing to go to urgent care due to address the pain she is in. She should schedule a one-hour hearing aid check when adjustments are needed. ENT appointment cancelled by provider. Patient verbalized agreement and understanding of the plan.     Goran Bejarano, CCC-A  Clinical Audiologist  [place] #[id number]

## 2023-06-26 ENCOUNTER — THERAPY VISIT (OUTPATIENT)
Dept: PHYSICAL THERAPY | Facility: CLINIC | Age: 83
End: 2023-06-26
Attending: INTERNAL MEDICINE
Payer: MEDICARE

## 2023-06-26 DIAGNOSIS — M48.00 SPINAL STENOSIS, UNSPECIFIED SPINAL REGION: ICD-10-CM

## 2023-06-26 DIAGNOSIS — G89.29 CHRONIC LEFT-SIDED LOW BACK PAIN WITH LEFT-SIDED SCIATICA: ICD-10-CM

## 2023-06-26 DIAGNOSIS — R53.1 WEAKNESS: Primary | ICD-10-CM

## 2023-06-26 DIAGNOSIS — M54.42 CHRONIC LEFT-SIDED LOW BACK PAIN WITH LEFT-SIDED SCIATICA: ICD-10-CM

## 2023-06-26 PROCEDURE — 97530 THERAPEUTIC ACTIVITIES: CPT | Mod: GP

## 2023-07-10 ENCOUNTER — MEDICAL CORRESPONDENCE (OUTPATIENT)
Dept: HEALTH INFORMATION MANAGEMENT | Facility: CLINIC | Age: 83
End: 2023-07-10
Payer: MEDICARE

## 2023-07-11 ENCOUNTER — TRANSCRIBE ORDERS (OUTPATIENT)
Dept: OTHER | Age: 83
End: 2023-07-11

## 2023-07-11 DIAGNOSIS — I89.0 LYMPHEDEMA: Primary | ICD-10-CM

## 2023-07-11 DIAGNOSIS — R60.9 EDEMA: ICD-10-CM

## 2023-07-17 ENCOUNTER — OFFICE VISIT (OUTPATIENT)
Dept: OTHER | Facility: CLINIC | Age: 83
End: 2023-07-17
Attending: PHYSICIAN ASSISTANT
Payer: MEDICARE

## 2023-07-17 VITALS
WEIGHT: 180 LBS | DIASTOLIC BLOOD PRESSURE: 63 MMHG | BODY MASS INDEX: 29.95 KG/M2 | SYSTOLIC BLOOD PRESSURE: 128 MMHG | OXYGEN SATURATION: 90 % | HEART RATE: 86 BPM

## 2023-07-17 DIAGNOSIS — I89.0 LYMPHEDEMA: Primary | ICD-10-CM

## 2023-07-17 DIAGNOSIS — T14.8XXA OPEN WOUND: ICD-10-CM

## 2023-07-17 PROCEDURE — 99215 OFFICE O/P EST HI 40 MIN: CPT | Performed by: PHYSICIAN ASSISTANT

## 2023-07-17 PROCEDURE — G0463 HOSPITAL OUTPT CLINIC VISIT: HCPCS

## 2023-07-17 NOTE — PATIENT INSTRUCTIONS
1. Get and wear edemawear. Wear this under your velcro compression wraps. Wear edemawear all day/night.     2. Be sure to see Lymphedema Therapy again.    3. Call if you develop fever or wounds worsen.     4. See Ortho Spine for your upper extremity issues.     5. Elevate your legs when sitting in your chair all day.     6. Follow-up with us in 3 months after lymphedema therapy to see how you are doing.     7. Call us with any questions or concerns ([phone]).

## 2023-07-17 NOTE — PROGRESS NOTES
[facility]      Patient wants to dicsuss MRI or possible imaging needed  remote Cart req. per MIKAYLA whitehead Job ID: [id number]  STREAMTEXT URL TO ACCESS CAPTIONS:   [email or web address]    /63 (BP Location: Right arm)   Pulse 86   Wt 180 lb (81.6 kg)   LMP  (LMP Unknown)   SpO2 90%   BMI 29.95 kg/m        Has homecare services and agency name:  Yes: Home care Nurse Selena Martínez.

## 2023-07-17 NOTE — PROGRESS NOTES
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      In 5/2022 she was seen by Vascular Surgery as CTA of the left lower extremity for colder, discolored foot showed loss of the peroneal artery in the midcalf region with no significant reconstitution. There was loss of the anterior tibial artery in the lower calf region. It was felt that posterior tibial artery extended into the foot although this was difficult to determine given motion. There was soft tissue edema seen in both lower extremities greatest in the left lower extremity. JOAQUIN was 1.0 on left and 0.89 on the right with TBI's adequate for healing. It was felt that her progressive decline in ability to ambulate was not secondary to her very mild PAD.      She has more recently felt like her abdomen is filling up with fluid. She has presented for this to the ED twice with CT with no acute findings other than likely constipation. They did give her some IV lasix last visit and she felt that this did help. She has been on Lasix 40 mg daily, although she states she is not taking this anymore. Amlodipine was discontinued. She is on chronic prednisone at 6 mg daily for many years now.      She is unable to get any compression socks on. She tried velcro compression wraps, but threw hers in the dryer and they have trouble sticking now. She is always sitting  in her wheelchair with legs down. She is hoping to get something else for her leg swelling.      She states she was recently started on thyroid medication but does not know what medication or why. Notes are not available in our system. Venous competency was negative for any venous incompetence. Echocardiogram was with Grade I or early diastolic dysfunction. She was referred to Lymphedema Therapy. She states she did this but that MLD was not done and she feels this only helped briefly.    She was recently hospitalized for Carpal Tunnel surgery. She was kept in the hospital for a week due to pain, and then sent to TCU. However, she was readmitted for another week as she got severely constipated. She again went back to TCU and has been undergoing PT.  She has developed significant edema in her legs again and now has an open wound on her left heel and a serous draining wound on her right shin.           PAST MEDICAL HISTORY  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    Hepatic abscess     Hyperlipidemia     Hypertension     Hypothyroidism     Irritable bowel syndrome     Meniere disease     Neuropathy     Noninfectious ileitis     Peritoneal abscess (H)     Spinal stenosis of lumbar region     Steroid long-term use     Vaginitis, atrophic, postmenopausal     Vitamin D deficiency        CURRENT MEDICATIONS  acetaminophen (TYLENOL) 325 MG tablet, Take 2 tablets (650 mg) by mouth every 4 hours as needed for other (For optimal non-opioid multimodal pain management to improve pain control.) (Patient not taking: Reported on 7/17/2023)  blood glucose (ACCU-CHEK  FASTCLIX) lancing device, FOR TESTING ONCE DAILY. DX  E11.9 TYPE 2 DIABETES  blood glucose (CONTOUR TEST) test strip, TESTING EVERY DAY DX  E11.9  busPIRone (BUSPAR) 7.5 MG tablet, Take 7.5 mg by mouth 3 times daily  calamine 8-8 % lotion, Apply topically every hour as needed for itching (Patient not taking: Reported on 7/17/2023)  clobetasol (TEMOVATE) 0.05 % external cream, Apply topically 2 times daily  CONTOUR NEXT TEST test strip, TESTING EVERY DAY DX  E11.9  cyanocobalamin 1000 MCG SUBL, Place 1,000 mcg under the tongue daily  diazepam (VALIUM) 2 MG tablet, Take 1 tablet (2 mg) by mouth every 6 hours as needed for anxiety  DULoxetine (CYMBALTA) 60 MG capsule, Take 60 mg by mouth every morning  glipiZIDE (GLUCOTROL) 5 MG tablet, Take 1 tablet (5 mg) by mouth 2 times daily (before meals)  hydrOXYzine (ATARAX) 25 MG tablet, Take 25 mg by mouth 3 times daily as needed for anxiety  JARDIANCE 25 MG TABS tablet, Take 25 mg by mouth every morning  lactulose (CHRONULAC) 10 GM/15ML solution, Take 15 mLs (10 g) by mouth every 8 hours as needed for constipation (Patient not taking: Reported on 7/17/2023)  lamoTRIgine (LAMICTAL) 100 MG tablet, Take 100 mg by mouth 2 times daily  lisinopril (ZESTRIL) 5 MG tablet, Take 5 mg by mouth daily  melatonin 3 MG tablet, Take 1 tablet (3 mg) by mouth nightly as needed for sleep (Patient not taking: Reported on 7/17/2023)  metFORMIN (GLUCOPHAGE) 500 MG tablet, Take 500 mg by mouth 2 times daily (with meals)  methocarbamol (ROBAXIN) 500 MG tablet, Take 1 tablet (500 mg) by mouth 3 times daily as needed for muscle spasms  miconazole (MICATIN) 2 % external powder, Apply topically 2 times daily as needed To the skin fold  naloxone (NARCAN) 4 MG/0.1ML nasal spray, Spray 1 spray (4 mg) into one nostril alternating nostrils as needed for opioid reversal every 2-3 minutes until assistance arrives  nystatin (MYCOSTATIN) 607259 UNIT/ML suspension, Take 5 mLs (500,000 Units) by mouth 4 times  daily x7 days through 4/23  omeprazole (PRILOSEC) 20 MG DR capsule, Take 20 mg by mouth daily  ondansetron (ZOFRAN ODT) 4 MG ODT tab, Take 1 tablet (4 mg) by mouth every 6 hours as needed for nausea or vomiting  order for DME, Equipment being ordered: wrist brace  oxybutynin ER (DITROPAN XL) 15 MG 24 hr tablet, Take 15 mg by mouth every morning  oxyCODONE (ROXICODONE) 5 MG tablet, Take 1-2 tablets (5-10 mg) by mouth every 6 hours as needed for moderate pain 1 if pain rated 1-5 & 2 if 6-10.  polyethylene glycol (MIRALAX) 17 GM/Dose powder, Take 17 g by mouth 2 times daily as needed for constipation  predniSONE (DELTASONE) 1 MG tablet, Take 1 mg by mouth every morning (In addition to the 5 mg dose; 1 mg + 5 mg = 6 mg)  predniSONE (DELTASONE) 5 MG tablet, Take 5 mg by mouth every morning (In addition to the 1 mg dose; 5 mg + 1 mg = 6 mg)  pregabalin (LYRICA) 50 MG capsule, Take 1 capsule (50 mg) by mouth 2 times daily  senna-docusate (SENOKOT-S/PERICOLACE) 8.6-50 MG tablet, Take 1 tablet by mouth 2 times daily as needed for constipation  triamcinolone (KENALOG) 0.1 % external cream, Apply topically 2 times daily as needed  triamterene-HCTZ (DYAZIDE) 37.5-25 MG capsule, Take 1 capsule by mouth every morning Hold for SBP<110  vitamin D3 (CHOLECALCIFEROL) 50 mcg (2000 units) tablet, Take 50 mcg by mouth daily    No current facility-administered medications on file prior to visit.      PAST SURGICAL HISTORY:  Past Surgical History:   Procedure Laterality Date    CATARACT IOL, RT/LT Left 7/2013    FOOT SURGERY      toe    GI SURGERY      IR LUMBAR/SACRAL TRANSFOR INJ BILATERAL Bilateral 3/11/2022    LAPAROSCOPIC HEPATECTOMY PARTIAL  11/4/2013    Procedure: LAPAROSCOPIC HEPATECTOMY PARTIAL;  Laparoscopic Debridement of Liver Abcess;  Surgeon: Sepideh Green MD;  Location: [facility] OR    ORTHOPEDIC SURGERY      PICC INSERTION  8/28/2013    5fr DL Power PICC, 41cm (1cm external length) in the R lateral brachial vein  with tip in the SVC RA junction.    RECTAL SURGERY      1970s    RELEASE CARPAL TUNNEL Left 3/21/2023    Procedure: LEFT OPEN CARPAL TUNNEL RELEASE. LEFT RING FINGER A1 PULLEY RELEASE;  Surgeon: Claire Hamilton MD;  Location: [facility] OR    VASCULAR SURGERY         ALLERGIES     Allergies   Allergen Reactions    Propofol Nausea and Vomiting    Shellfish Allergy      Other reaction(s): Dizziness    Capsaicin Rash and Blisters       FAMILY HISTORY  Family History   Problem Relation Age of Onset    Cerebrovascular Disease Mother     Heart Disease Mother     Heart Disease Father     Heart Disease Brother     Diabetes No family hx of     Coronary Artery Disease No family hx of     Hypertension No family hx of     Hyperlipidemia No family hx of     Breast Cancer No family hx of     Colon Cancer No family hx of     Prostate Cancer No family hx of     Other Cancer No family hx of     Depression No family hx of     Anxiety Disorder No family hx of     Mental Illness No family hx of     Substance Abuse No family hx of     Anesthesia Reaction No family hx of     Asthma No family hx of     Osteoporosis No family hx of     Genetic Disorder No family hx of     Thyroid Disease No family hx of     Obesity No family hx of     Unknown/Adopted No family hx of        SOCIAL HISTORY  Social History     Socioeconomic History    Marital status:      Spouse name: Not on file    Number of children: Not on file    Years of education: Not on file    Highest education level: Not on file   Occupational History    Not on file   Tobacco Use    Smoking status: Former     Types: Cigarettes     Quit date: 11/15/1987     Years since quittin.9    Smokeless tobacco: Former   Substance and Sexual Activity    Alcohol use: Not Currently     Alcohol/week: 0.0 standard drinks     Comment: MALISSA white since     Drug use: No     Comment: used marijuanna in the past    Sexual activity: Never     Partners: Male   Other Topics Concern     Parent/sibling w/ CABG, MI or angioplasty before 65F 55M? No       ROS:   General: No change in weight, sleep or appetite.  Normal energy.  No fever or chills  Eyes: Negative for vision changes or eye problems  ENT: No problems with nose or throat.  No difficulty swallowing. Deaf right ear, minimal hearing in left ear.   Resp: No coughing, wheezing or shortness of breath  CV: No chest pains or palpitations  GI: No nausea, vomiting,  heartburn, abdominal pain, diarrhea, constipation or change in bowel habits. Has abdominal fullness.   : No urinary frequency or dysuria, bladder or kidney problems  Musculoskeletal: No significant muscle or joint pains. Bilateral lower extremity edema.   Neurologic: No headaches. Nerve problems upper extremities. Poor balance with recent fall.   Psychiatric: No problems with anxiety, depression or mental health  Heme/immune/allergy: No history of bleeding or clotting problems or anemia.  No allergies or immune system problems  Endocrine: + DM, recently started on thyroid medication.   Skin: No rashes,worrisome lesions or skin problems  Vascular:  No claudication, lifestyle limiting or otherwise; no ischemic rest pain; no non-healing ulcers. No weakness, No loss of sensation        EXAM:  /63 (BP Location: Right arm)   Pulse 86   Wt 180 lb (81.6 kg)   LMP  (LMP Unknown)   SpO2 90%   BMI 29.95 kg/m    In general, the patient is a pleasant female in no apparent distress.    HEENT: NC/AT.  PERRLA.  EOMI.  Sclerae white, not injected. Can't hear out of right ear. Left ear with a very small amount of hearing so uses a .   Heart: RRR. Normal S1, S2 splits physiologically. No murmur, rub, click, or gallop.   Lungs: CTA.  No ronchi, wheezes, rales.    Abdomen: Soft, nontender, nondistended.   Extremities: No clubbing, cyanosis. No wounds. Bilateral lower extremity edema. + Stemmer sign. Spider veins in both ankles and feet. Wound that is clean on bottom of left heel. Also  with wound on right shin with serous drainage.                     Labs:  LIVER ENZYME RESULTS:  Lab Results   Component Value Date    AST 18 05/22/2023    AST 25 02/14/2017    ALT 22 05/22/2023    ALT 26 02/14/2017       CBC RESULTS:  Lab Results   Component Value Date    WBC 8.5 05/22/2023    WBC 8.9 04/12/2016    RBC 4.49 05/22/2023    RBC 3.99 04/12/2016    HGB 9.7 (L) 05/22/2023    HGB 12.2 10/24/2017    HCT 35.6 05/22/2023    HCT 36.0 04/12/2016    MCV 79 05/22/2023    MCV 90 04/12/2016    MCH 21.6 (L) 05/22/2023    MCH 26.1 (L) 04/12/2016    MCHC 27.2 (L) 05/22/2023    MCHC 28.9 (L) 04/12/2016    RDW 18.3 (H) 05/22/2023    RDW 15.8 (H) 04/12/2016     05/22/2023     04/12/2016       BMP RESULTS:  Lab Results   Component Value Date     05/22/2023     10/24/2017    POTASSIUM 3.4 (L) 05/22/2023    POTASSIUM 3.9 10/24/2017    CHLORIDE 100 05/22/2023    CHLORIDE 101 10/24/2017    CO2 28 05/22/2023    CO2 28 10/24/2017    ANIONGAP 10 05/22/2023    ANIONGAP 10 10/24/2017     (H) 05/22/2023     (H) 10/24/2017    BUN 16 05/22/2023    BUN 18 10/24/2017    CR 0.90 05/22/2023    CR 0.84 10/24/2017    GFRESTIMATED 64 05/22/2023    GFRESTIMATED >60 12/29/2020    GFRESTIMATED 66 10/24/2017    GFRESTBLACK >60 12/29/2020    GFRESTBLACK 80 10/24/2017    MACY 8.9 05/22/2023    MACY 9.5 10/24/2017        A1C RESULTS:  Lab Results   Component Value Date    A1C 6.9 (H) 03/21/2023    A1C 6.8 (H) 10/24/2017       THYROID RESULTS:  Lab Results   Component Value Date    TSH 0.68 08/15/2022    TSH 1.86 09/06/2016         Procedures:   EXAM: CT ABDOMEN PELVIS W CONTRAST  LOCATION: [facility]  DATE/TIME: 5/22/2023 2:03 PM CDT     INDICATION: Left sided abdominal pain  COMPARISON: CT of the abdomen and pelvis 11/03/2022  TECHNIQUE: CT scan of the abdomen and pelvis was performed following injection of IV contrast. Multiplanar reformats were obtained. Dose reduction techniques  were used.  CONTRAST: ISOVUE 370 100mL     FINDINGS:   LOWER CHEST: Normal.     HEPATOBILIARY: Diffuse hepatic steatosis. No significant mass. No bile duct dilatation. No calcified gallstones.     PANCREAS: Normal.     SPLEEN: Normal.     ADRENAL GLANDS: Normal.     KIDNEYS/BLADDER: No significant mass, stones, or hydronephrosis. There are simple or benign cysts. No follow up is needed.     BOWEL: Stomach is decompressed. Small bowel is normal. Normal appendix. The ascending colon is normal and most of the transverse colon is decompressed. The descending colon has mucosal hyperenhancement and subtle wall thickening. The sigmoid colon has   more extensive mucosal enhancement and wall thickening in addition to numerous noninflamed diverticula. No mesenteric inflammatory stranding. No pneumatosis or pneumoperitoneum. No ascites.     LYMPH NODES: Normal.     VASCULATURE: Circumaortic left renal vein. Mild, patchy aortoiliac atheromatous calcifications.     PELVIC ORGANS: Calcified fundal uterine fibroid to the right.     MUSCULOSKELETAL: Mild/moderate lumbar disc space narrowing greatest at L5-S1.                                                                      IMPRESSION:      1.  Long segment mucosal hyperenhancement and wall thickening of the descending and sigmoid colon consistent with acute colitis. There are also numerous sigmoid diverticula but none of the diverticula are specifically enlarged/inflamed.      EXAM TYPE  BILATERAL LOWER EXTREMITY VENOUS DUPLEX FOR VENOUS INSUFFICIENCY  TECHNICAL SUMMARY 11/8/22     A duplex ultrasound study using color flow was performed to evaluate the bilateral lower extremity veins for valvular incompetence with the patient in a steep reversed trendelenberg.      RIGHT:     The deep veins demonstrate phasic flow, compress, and respond to augmentations.  There is no reflux or DVT.      The GSV demonstrates phasic flow, compresses, and responds to augmentations from the  saphenofemoral junction to the ankle with no evidence of reflux or thrombus. The greater saphenous vein measures 8.0 mm at the saphenofemoral junction, 5.9 mm in the proximal thigh, and 3.0 mm at the knee.     The AASV is not visualized.      The Giacomini vein is competent ( 2.4 mm) communicating with the small saphenous vein at the knee level.      The SSV demonstrates phasic flow, compresses, and responds to augmentations from the popliteal space to the ankle.  No reflux or thrombus is seen.  The saphenopopliteal junction is absent.      Perforators: There is no evidence of incompetent  veins at any level.      LEFT:     The deep veins demonstrate phasic flow, compress, and respond to augmentations.  There is no reflux or DVT.       The GSV demonstrates phasic flow, compresses, and responds to augmentations from the saphenofemoral junction to the ankle with no evidence of reflux or thrombus. The greater saphenous vein measures 6.8 mm at the saphenofemoral junction, 6.5 mm in the proximal thigh, and 4.5 mm at the knee.      The AASV is competent ( 3.2 mm) draining into the saphenofemoral junction.     The Giacomini vein is competent ( 0.9 mm) communicating with the small saphenous vein at the knee level.      The SSV demonstrates phasic flow, compresses, and responds to augmentations from the popliteal space to the ankle.  No reflux or thrombus is seen. The saphenopopliteal junction is absent.      Perforators: There is no evidence of incompetent  veins at any level.      FINAL SUMMARY:  1.  No evidence of deep vein thrombosis in either lower extremity  2.  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  No evidence of superficial venous or  vein insufficiency in either lower extremity       Procedure  Complete Echo Adult. Optison ([facility] #2126-7700) given intravenously 11/3/22     ______________________________________________________________________________  Interpretation Summary      1. Normal biventricular size and function. Left ventricular ejection fraction  of 60-65%.  2. No segmental wall motion abnormalities noted.  3. No hemodynamically significant valvular disease.  No prior study. Technically difficult study.  ______________________________________________________________________________  Left Ventricle  The left ventricle is normal in size. There is normal left ventricular wall  thickness. Left ventricular systolic function is normal. The visual ejection  fraction is 60-65%. Grade I or early diastolic dysfunction. No regional wall  motion abnormalities noted.      [facility]  CT OF THE CHEST, ABDOMEN, PELVIS, AND BILATERAL LOWER EXTREMITIES USING CTA TECHNIQUE WITH RUNOFF.  5/26/2022 10:00 PM     INDICATION: cold, pale bilateral feet, dimished pulse in right foot. no pulse in left foot  TECHNIQUE: Noncontrast. Axial, sagittal and coronal thin-section reconstruction. Dose reduction techniques were used.  COMPARISON: None.     FINDINGS: CTA examination of the chest demonstrates mild calcification of the thoracic aorta with no evidence for significant stenosis, aneurysmal dilatation, dissection, or intraluminal clot.     CTA examination of the abdomen demonstrates mild vascular calcification involving the abdominal aorta with no evidence for significant stenosis, aneurysmal dilatation, dissection, or intraluminal clot identified.     CTA examination of the pelvis demonstrates mild vascular calcification with no evidence for significant stenosis, aneurysmal dilatation, dissection, or intraluminal clot. There is good flow extending into both groins.     CTA examination of the right lower extremity shows the catheter vessels to be small in size, but are patent with three-vessel flow to the ankle and two-vessel flow into the foot via the tibial arteries.     CTA of the left lower extremity shows loss of the peroneal artery in the midcalf region with no significant  reconstitution. There is loss of the anterior tibial artery in the lower calf region. I believe the posterior tibial artery extend into the foot   although this is difficult to determine given motion.     CT of the chest demonstrates mild to moderate calcification most prominent in the LAD. There is slight dependent atelectasis seen in both lower lobes. There are minimal findings of centrilobular emphysema seen in both lungs.     CT examination of the abdomen demonstrates fatty infiltration of liver. The gallbladder and bile ducts are normal in caliber. The pancreas is normal. The spleen is normal. The adrenals are normal. There are scattered benign simple cysts seen in both   kidneys with no follow-up recommended. The bowel demonstrates mild findings of obstipation in the colon. There is localized narrowing seen involving the hepatic flexure of the colon this likely represents an area of spasm since there is no evidence for   stranding of the adjacent fat to suggest colitis. There are moderate findings of diverticulosis with no evidence for diverticulitis. The pelvic organs demonstrates a calcification in the atrophic uterus most typical for some fibroids. The pelvic organs   are otherwise normal. The musculoskeletal region demonstrates a small fatty umbilical hernia. There are some scattered injection granulomas in the buttocks. Significant degenerative changes are seen throughout the spine to include degenerative disc   disease at the L4 and L5 levels.                                                                      IMPRESSION:     1. There is some loss of detail given patient motion especially in the lower extremities.     2. CTA of the left lower extremity shows loss of the peroneal artery in the midcalf region with no significant reconstitution. There is loss of the anterior tibial artery in the lower calf region. I believe the posterior tibial artery extend into the   foot although this is difficult to  determine given motion. There is soft tissue edema seen in both lower extremities greatest in the left lower extremity.     3.There is localized narrowing seen involving the hepatic flexure of the colon this likely represents an area of spasm since there is no evidence for stranding of the adjacent fat to suggest colitis.         EXAM: RESTING ANKLE-BRACHIAL INDICES (ABIs)  LOCATION: [facility]  DATE/TIME: 5/27/2022 7:12 AM     INDICATION: PAD.  COMPARISON: None.     JOAQUIN FINDINGS:  RIGHT  Brachial: 176  Ankle (PT): 157 Index: 0.87  Ankle (DP): 161 Index: 0.89  Digit: 73 Index: 0.41     LEFT  Brachial: 180  Ankle (PT): 172 Index: 0.96  Ankle (DP): 186 Index: 1.0  Digit: 46 Index: 0.26     The right JOAQUIN at rest is 0.89. The left JOAQUIN at rest is 1.0.                                                                        IMPRESSION:  1.  RIGHT LOWER EXTREMITY: JOAQUIN at rest is borderline abnormal. Right digital index is decreased however digital pressures are adequate for healing potential.  2.  LEFT LOWER EXTREMITY: JOAQUIN at rest is normal. Left digital index is decreased however digital pressures are adequate for healing potential.       Assessment and Plan:   Bilateral lower extremity edema/Lymphedema     -Venous competency negative for any venous incompetence.   -Renal function and liver function are normal. Albumin is normal.  -CT abdomen with no external compression/etiology.   -Echocardiogram with early/grade I diastolic dysfunction - this is unlikely to be the cause of this extent of edema.   -+ Lymphedema with + stemmer sign.   -Unable to get strong enough compression socks on but recently was able to do velcro compression.  -Swelling exacerbated by legs always in dependant position while sitting in wheelchair all day. The swelling and heaviness is significantly impacting her ability to walk and rehabilitate.   -Wounds that are newly formed on legs are clean with no evidence of infection.       Recommendations:   -Advised her to get EdemaWear and she should wear this under her velcro compression wraps. She had been able to get the compression wraps on, but she accidentally placed them in the dryer and they don't velcro well anymore.  A new prescription was given.   -Referral placed to Lymphedema Therapy again. Consider manual lymphatic drainage. As she is unable to utilize socks and at times other wraps due to difficulty getting them on, consider lymphedema pumps and appreciate any guidance or tricks lymphedema therapy may have for compression.   -She should elevate her legs when sitting around.   -She is to make sure she is not taking Amlodipine. She is not sure what medications are in her pill boxes and states lately things have been getting messed up. The chart here states amlodipine was stopped about 6 months ago, but she is to verify she is not taking it. She also was advised to bring an updated medication form to her next visit.   -She is to follow-up in 3 months to reassess how lymphedema therapy went.        Caitlin Osullivan PA-C      50 minutes spent on the date of the encounter doing chart review, history and exam, documentation, and further activities as noted above, all in the presence of a home health aid and  for her hearing disability.

## 2023-07-24 ENCOUNTER — THERAPY VISIT (OUTPATIENT)
Dept: PHYSICAL THERAPY | Facility: CLINIC | Age: 83
End: 2023-07-24
Attending: PHYSICIAN ASSISTANT
Payer: MEDICARE

## 2023-07-24 DIAGNOSIS — R60.0 BILATERAL LEG EDEMA: ICD-10-CM

## 2023-07-24 DIAGNOSIS — R53.1 WEAKNESS: Primary | ICD-10-CM

## 2023-07-24 DIAGNOSIS — G89.29 CHRONIC LEFT-SIDED LOW BACK PAIN WITH LEFT-SIDED SCIATICA: ICD-10-CM

## 2023-07-24 DIAGNOSIS — M48.00 SPINAL STENOSIS, UNSPECIFIED SPINAL REGION: ICD-10-CM

## 2023-07-24 DIAGNOSIS — M54.42 CHRONIC LEFT-SIDED LOW BACK PAIN WITH LEFT-SIDED SCIATICA: ICD-10-CM

## 2023-07-24 PROCEDURE — 97530 THERAPEUTIC ACTIVITIES: CPT | Mod: GP

## 2023-07-31 ENCOUNTER — THERAPY VISIT (OUTPATIENT)
Dept: PHYSICAL THERAPY | Facility: CLINIC | Age: 83
End: 2023-07-31
Attending: PHYSICIAN ASSISTANT
Payer: MEDICARE

## 2023-07-31 DIAGNOSIS — R53.1 WEAKNESS: Primary | ICD-10-CM

## 2023-07-31 DIAGNOSIS — M48.00 SPINAL STENOSIS, UNSPECIFIED SPINAL REGION: ICD-10-CM

## 2023-07-31 DIAGNOSIS — R60.0 BILATERAL LEG EDEMA: ICD-10-CM

## 2023-07-31 PROCEDURE — 97110 THERAPEUTIC EXERCISES: CPT | Mod: GP | Performed by: PHYSICAL THERAPIST

## 2023-07-31 PROCEDURE — 97530 THERAPEUTIC ACTIVITIES: CPT | Mod: GP | Performed by: PHYSICAL THERAPIST

## 2023-08-03 ENCOUNTER — HOSPITAL ENCOUNTER (EMERGENCY)
Facility: CLINIC | Age: 83
Discharge: HOME | End: 2023-08-03
Admitting: PHYSICIAN ASSISTANT
Payer: MEDICARE

## 2023-08-03 VITALS
HEART RATE: 100 BPM | DIASTOLIC BLOOD PRESSURE: 63 MMHG | RESPIRATION RATE: 20 BRPM | TEMPERATURE: 97.2 F | SYSTOLIC BLOOD PRESSURE: 157 MMHG | OXYGEN SATURATION: 95 %

## 2023-08-03 DIAGNOSIS — S81.801A WOUND OF RIGHT LOWER EXTREMITY, INITIAL ENCOUNTER: ICD-10-CM

## 2023-08-03 DIAGNOSIS — N89.8 VAGINAL ODOR: ICD-10-CM

## 2023-08-03 DIAGNOSIS — K59.00 CONSTIPATION, UNSPECIFIED CONSTIPATION TYPE: ICD-10-CM

## 2023-08-03 LAB
CLUE CELLS: ABNORMAL
TRICHOMONAS, WET PREP: ABNORMAL
WBC'S/HIGH POWER FIELD, WET PREP: ABNORMAL
YEAST, WET PREP: PRESENT

## 2023-08-03 PROCEDURE — 87070 CULTURE OTHR SPECIMN AEROBIC: CPT | Performed by: PHYSICIAN ASSISTANT

## 2023-08-03 PROCEDURE — 87210 SMEAR WET MOUNT SALINE/INK: CPT | Performed by: PHYSICIAN ASSISTANT

## 2023-08-03 PROCEDURE — 99283 EMERGENCY DEPT VISIT LOW MDM: CPT

## 2023-08-03 PROCEDURE — 87205 SMEAR GRAM STAIN: CPT | Performed by: PHYSICIAN ASSISTANT

## 2023-08-03 RX ORDER — FLUCONAZOLE 150 MG/1
150 TABLET ORAL ONCE
Qty: 1 TABLET | Refills: 0 | Status: SHIPPED | OUTPATIENT
Start: 2023-08-03 | End: 2023-08-03

## 2023-08-03 ASSESSMENT — ACTIVITIES OF DAILY LIVING (ADL)
ADLS_ACUITY_SCORE: 33
ADLS_ACUITY_SCORE: 33

## 2023-08-03 NOTE — ED PROVIDER NOTES
"EMERGENCY DEPARTMENT SIGN OUT NOTE        ED COURSE AND MEDICAL DECISION MAKING  Patient was signed out to me by Gaby Blakc PA-C at 5:55 PM    In brief, Jumana Yang is a 82 year old female who initially presented right leg wound, concern for infection, subsequently mentioned a \"vaginal odor\".      At time of sign out, disposition was pending patient collection of wet prep, she will self swab.     ED Course as of 08/03/23 1947   Thu Aug 03, 2023   1947 Patient with yeast present, consistent with vaginal candidiasis.  Will send home with a dose of fluconazole.  Patient will be discharged.         FINAL IMPRESSION    1. Constipation, unspecified constipation type    2. Wound of right lower extremity, initial encounter    3. Vaginal odor      ED Course as of 08/03/23 1947   Thu Aug 03, 2023   1947 Patient with yeast present, consistent with vaginal candidiasis.  Will send home with a dose of fluconazole.  Patient will be discharged.         ED MEDS  Medications - No data to display    LAB  Labs Ordered and Resulted from Time of ED Arrival to Time of ED Departure   WET PREPARATION - Abnormal       Result Value    Trichomonas Absent      Yeast Present (*)     Clue Cells Absent      WBCs/high power field 3+ (*)    AEROBIC BACTERIAL CULTURE ROUTINE       EKG  None.     RADIOLOGY    No orders to display       DISCHARGE MEDS  Discharge Medication List as of 8/3/2023  7:10 PM            Jamil Holden PA-C  Emergency Medicine  [facility] EMERGENCY ROOM  [address] [place] [id number]  [phone]     Jamil Holden PA-C  08/03/23 1947    "

## 2023-08-03 NOTE — DISCHARGE INSTRUCTIONS
You were seen in the emergency department for a wound on your right lower leg.  This does not appear infected, we did swab it today.  It is okay for it to have some pascale, yellow-colored discharge.  True pus is more like off-white/mayonnaise in texture.  Other signs of infection include redness that increases and does not go away, warmth to the area, increased pain and fever.    You can keep these wounds covered with bacitracin or Vaseline and a bandage.  Because of your lymphedema, the swelling in your legs slows down the healing process.  It can take several weeks for these wounds to heal.    For your constipation, please start taking MiraLAX, 1 capful daily and docusate to help soften the stool. Make sure you are drinking enough water throughout the day.     Your vaginal swab is in process. You will receive a phone call if it shows any bacterial vaginosis or yeast which can be treated with oral medication.     If the area of irritation continues, please discuss this with your primary care provider. You may try taking soaking baths to help with the discomfort.

## 2023-08-03 NOTE — ED PROVIDER NOTES
EMERGENCY DEPARTMENT ENCOUNTER      NAME: Jumana Yang  AGE: 82 year old female  YOB: 1940  MRN: [id number]  EVALUATION DATE & TIME: 8/3/2023  3:30 PM    PCP: Ananya Mclean    ED PROVIDER: Gaby Black PA-C      Chief Complaint   Patient presents with    Wound Check     RLE     Leg Swelling     RLE         FINAL IMPRESSION:  1. Constipation, unspecified constipation type    2. Wound of right lower extremity, initial encounter    3. Vaginal odor          MEDICAL DECISION MAKING:    Pertinent Labs & Imaging studies reviewed. (See chart for details)  82 year old female with a h/o IBS, CKD, lymphedema, DM 2 presents to the Emergency Department for evaluation of wounds to her lower right leg which spontaneously occurred for the last week or so.  She was concerned this was leaking purulent material.  Also concerned about constipation which she is having to manually remove, as well as vaginal odor and discomfort near the labia.  On exam she is alert, nontoxic-appearing and in no acute distress, she is hard of hearing and it is helpful to speak in a low voice slowly.  Vitals are notable for elevated blood pressure of 157/63.    There is a dime sized wound at the anterior right shin but does not have significant surrounding erythema, warmth or tenderness to palpation.  There is normal granulation tissue at the base of this wound.  It was swabbed and sent for culture, though low suspicion for infection at this time.    Regarding her constipation, we did discuss a bowel regimen to include MiraLAX and docusate, increasing fluids and fiber rich vegetables.  She also reported vaginal odor and labial discomfort.  We did discuss performing a pelvic exam, however unfortunately during the course of her emergency department stay there was not a bed available prior to the end of my shift and she did not want any of the on shift male providers to perform her pelvic exam.  Therefore, she will self swab,  and can be discharged home with follow-up with primary for pelvic exam regarding labial discomfort.     There is no evidence of acute or emergent process requiring intervention at this time. Pt is appropriate for outpatient management. Provisional nature of today's diagnosis was discussed and strict return precautions were given. Pt expressed understanding and She was discharged to home in good condition.     Medical Decision Making    History:  Supplemental history from: Documented in chart, if applicable and Caregiver  External Record(s) reviewed: Documented in chart, if applicable.    Work Up:  Chart documentation includes differential considered and any EKGs or imaging independently interpreted by provider, where specified.  In additional to work up documented, I considered the following work up: Documented in chart, if applicable.    External consultation:  Discussion of management with another provider: Documented in chart, if applicable    Complicating factors:  Care impacted by chronic illness: Diabetes, Hypertension, and Other: lymphedema  Care affected by social determinants of health: N/A    Disposition considerations: pending, anticipate discharge after swabs      CRITICAL CARE: None    ED COURSE  1:45 PM  Met and evaluated patient in triage. Discussed ED plan.   5:00 PM Signed out to Jimi Holden PA-C pending self swab    MEDICATIONS GIVEN IN THE EMERGENCY:  Medications - No data to display    NEW PRESCRIPTIONS STARTED AT TODAY'S ER VISIT  New Prescriptions    No medications on file          =================================================================    HPI    Patient information was obtained from: BRYNN Vasquez    Use of Intrepreter: N/A         Jumana Yang is a 82 year old female hard of hearing who presents for wound evaluation.  She reports that she is diabetic and has lymphedema, has a nonhealing wound at the anterior left shin which has been present for several weeks.  She  "noticed a new area that opened at the anterior right shin that is approximately the size of a dime that has been leaking mustard colored fluid, she was concerned for infection given her diabetes which prompted her evaluation today.  She has not had any pain, fever, warmth or redness.  She does have compression stockings but notes that she has not been wearing them recently.  She is following with the lymphedema specialist.    She also reports having intermittent constipation with hard stools that she at times attempts to manually remove.  She has been thinking about taking medications to help with her bowels but is unsure what to take.    She also describes a vaginal odor without any itching, there is also describes some labial discomfort.  She does not shave this area.  Notes that she does have some \"abnormality\" in this area previously, which she does mention to her primary care and has not been of concern.  She states that she has had discomfort in this area for approximately 1 week.  She has not had any drainage.        REVIEW OF SYSTEMS   As noted in HPI. All other systems negative.    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    Hepatic abscess     Hyperlipidemia     Hypertension     Hypothyroidism     Irritable bowel syndrome     Meniere disease     Neuropathy     Noninfectious ileitis     Peritoneal abscess (H)     Spinal stenosis of lumbar region     Steroid long-term use     Vaginitis, atrophic, postmenopausal     Vitamin D deficiency        PAST SURGICAL HISTORY:  Past Surgical History:   Procedure Laterality Date    " CATARACT IOL, RT/LT Left 7/2013    FOOT SURGERY      toe    GI SURGERY      IR LUMBAR/SACRAL TRANSFOR INJ BILATERAL Bilateral 3/11/2022    LAPAROSCOPIC HEPATECTOMY PARTIAL  11/4/2013    Procedure: LAPAROSCOPIC HEPATECTOMY PARTIAL;  Laparoscopic Debridement of Liver Abcess;  Surgeon: Sepideh Green MD;  Location: [facility] OR    ORTHOPEDIC SURGERY      PICC INSERTION  8/28/2013    5fr DL Power PICC, 41cm (1cm external length) in the R lateral brachial vein with tip in the SVC RA junction.    RECTAL SURGERY      1970s    RELEASE CARPAL TUNNEL Left 3/21/2023    Procedure: LEFT OPEN CARPAL TUNNEL RELEASE. LEFT RING FINGER A1 PULLEY RELEASE;  Surgeon: Claire Hamilton MD;  Location: [facility] OR    VASCULAR SURGERY             CURRENT MEDICATIONS:    acetaminophen (TYLENOL) 325 MG tablet  blood glucose (ACCU-CHEK FASTCLIX) lancing device  blood glucose (CONTOUR TEST) test strip  busPIRone (BUSPAR) 7.5 MG tablet  calamine 8-8 % lotion  clobetasol (TEMOVATE) 0.05 % external cream  CONTOUR NEXT TEST test strip  cyanocobalamin 1000 MCG SUBL  diazepam (VALIUM) 2 MG tablet  DULoxetine (CYMBALTA) 60 MG capsule  glipiZIDE (GLUCOTROL) 5 MG tablet  hydrOXYzine (ATARAX) 25 MG tablet  JARDIANCE 25 MG TABS tablet  lactulose (CHRONULAC) 10 GM/15ML solution  lamoTRIgine (LAMICTAL) 100 MG tablet  lisinopril (ZESTRIL) 5 MG tablet  melatonin 3 MG tablet  metFORMIN (GLUCOPHAGE) 500 MG tablet  methocarbamol (ROBAXIN) 500 MG tablet  miconazole (MICATIN) 2 % external powder  naloxone (NARCAN) 4 MG/0.1ML nasal spray  nystatin (MYCOSTATIN) 895588 UNIT/ML suspension  omeprazole (PRILOSEC) 20 MG DR capsule  ondansetron (ZOFRAN ODT) 4 MG ODT tab  order for DME  oxybutynin ER (DITROPAN XL) 15 MG 24 hr tablet  oxyCODONE (ROXICODONE) 5 MG tablet  polyethylene glycol (MIRALAX) 17 GM/Dose powder  predniSONE (DELTASONE) 1 MG tablet  predniSONE (DELTASONE) 5 MG tablet  pregabalin (LYRICA) 50 MG capsule  senna-docusate (SENOKOT-S/PERICOLACE) 8.6-50  MG tablet  triamcinolone (KENALOG) 0.1 % external cream  triamterene-HCTZ (DYAZIDE) 37.5-25 MG capsule  vitamin D3 (CHOLECALCIFEROL) 50 mcg (2000 units) tablet        ALLERGIES:  Allergies   Allergen Reactions    Propofol Nausea and Vomiting    Shellfish Allergy      Other reaction(s): Dizziness    Capsaicin Rash and Blisters       FAMILY HISTORY:  Family History   Problem Relation Age of Onset    Cerebrovascular Disease Mother     Heart Disease Mother     Heart Disease Father     Heart Disease Brother     Diabetes No family hx of     Coronary Artery Disease No family hx of     Hypertension No family hx of     Hyperlipidemia No family hx of     Breast Cancer No family hx of     Colon Cancer No family hx of     Prostate Cancer No family hx of     Other Cancer No family hx of     Depression No family hx of     Anxiety Disorder No family hx of     Mental Illness No family hx of     Substance Abuse No family hx of     Anesthesia Reaction No family hx of     Asthma No family hx of     Osteoporosis No family hx of     Genetic Disorder No family hx of     Thyroid Disease No family hx of     Obesity No family hx of     Unknown/Adopted No family hx of        SOCIAL HISTORY:   Social History     Socioeconomic History    Marital status:      Spouse name: Not on file    Number of children: Not on file    Years of education: Not on file    Highest education level: Not on file   Occupational History    Not on file   Tobacco Use    Smoking status: Former     Types: Cigarettes     Quit date: 11/15/1987     Years since quittin.7    Smokeless tobacco: Former   Substance and Sexual Activity    Alcohol use: Not Currently     Alcohol/week: 0.0 standard drinks of alcohol     Comment: MALISSA white since     Drug use: No     Comment: used marijuanna in the past    Sexual activity: Never     Partners: Male   Other Topics Concern    Parent/sibling w/ CABG, MI or angioplasty before 65F 55M? No   Social History Narrative     ** Merged History Encounter **          Social Determinants of Health     Financial Resource Strain: Not on file   Food Insecurity: Not on file   Transportation Needs: Not on file   Physical Activity: Not on file   Stress: Not on file   Social Connections: Not on file   Intimate Partner Violence: Not on file   Housing Stability: Not on file         VITALS:  Patient Vitals for the past 24 hrs:   BP Temp Pulse Resp SpO2   08/03/23 1249 (!) 157/63 97.2  F (36.2  C) 100 20 95 %       PHYSICAL EXAM    Physical Exam  Vitals reviewed.   Constitutional:       General: She is not in acute distress.     Appearance: Normal appearance. She is not toxic-appearing or diaphoretic.      Comments: Chronically ill appearing   HENT:      Head: Normocephalic and atraumatic.      Comments: Very hard of hearing, benefits from low tone voice, speaking slowly with annunciation     Nose: No congestion.      Mouth/Throat:      Mouth: Mucous membranes are moist.   Cardiovascular:      Rate and Rhythm: Normal rate.   Pulmonary:      Effort: Pulmonary effort is normal.      Breath sounds: No stridor.   Musculoskeletal:         General: No swelling or deformity. Normal range of motion.      Cervical back: Normal range of motion and neck supple.      Right lower leg: Edema (1+) present.      Left lower leg: Edema (1+) present.   Skin:     General: Skin is warm.      Capillary Refill: Capillary refill takes less than 2 seconds.      Coloration: Skin is not jaundiced.      Findings: No bruising, erythema, lesion or rash.      Comments: Right anterior shin lesion measuring ~1cm in diameter, pascale granulation tissue present without purulent drainage, warmth, erythema or tenderness.    Neurological:      General: No focal deficit present.      Mental Status: She is alert.   Psychiatric:         Mood and Affect: Mood normal.         Behavior: Behavior normal.          LAB:  All pertinent labs reviewed and interpreted.  Pending:   Wound culture  Wet  prep      RADIOLOGY:  Reviewed all pertinent imaging. Please see official radiology report    No orders to display           Gaby Black PA-C  Emergency Medicine  [facility] EMERGENCY ROOM  [address] [facility] [address] [phone]  Dept: [phone]    This note has in part been created with speech recognition technology and may create an occasional, unintended word/grammar substitution. Errors are generally corrected in real time. Please message me via [employer] In Basket if you note any errors requiring clarification.       Gaby Black PA-C  08/03/23 2821

## 2023-08-03 NOTE — ED TRIAGE NOTES
Pt presents with RLE wound check. Pt reports she is diabetic. Pt expresses c/o BLE leg swelling. ABCs intact.      Triage Assessment       Row Name 08/03/23 1252       Triage Assessment (Adult)    Airway WDL WDL       Respiratory WDL    Respiratory WDL WDL       Skin Circulation/Temperature WDL    Skin Circulation/Temperature WDL WDL       Cardiac WDL    Cardiac WDL WDL       Peripheral/Neurovascular WDL    Peripheral Neurovascular WDL WDL       Cognitive/Neuro/Behavioral WDL    Cognitive/Neuro/Behavioral WDL WDL       Dmitry Coma Scale    Best Eye Response 4-->(E4) spontaneous    Best Motor Response 6-->(M6) obeys commands    Best Verbal Response 5-->(V5) oriented    Dmitry Coma Scale Score 15

## 2023-08-07 ENCOUNTER — THERAPY VISIT (OUTPATIENT)
Dept: PHYSICAL THERAPY | Facility: CLINIC | Age: 83
End: 2023-08-07
Attending: NURSE PRACTITIONER
Payer: MEDICARE

## 2023-08-07 ENCOUNTER — THERAPY VISIT (OUTPATIENT)
Dept: PHYSICAL THERAPY | Facility: CLINIC | Age: 83
End: 2023-08-07
Attending: PHYSICIAN ASSISTANT
Payer: MEDICARE

## 2023-08-07 ENCOUNTER — TELEPHONE (OUTPATIENT)
Dept: WOUND CARE | Facility: CLINIC | Age: 83
End: 2023-08-07

## 2023-08-07 DIAGNOSIS — I89.0 LYMPHEDEMA: ICD-10-CM

## 2023-08-07 DIAGNOSIS — R53.1 WEAKNESS: Primary | ICD-10-CM

## 2023-08-07 DIAGNOSIS — G89.29 CHRONIC LEFT-SIDED LOW BACK PAIN WITH LEFT-SIDED SCIATICA: ICD-10-CM

## 2023-08-07 DIAGNOSIS — M54.42 CHRONIC LEFT-SIDED LOW BACK PAIN WITH LEFT-SIDED SCIATICA: ICD-10-CM

## 2023-08-07 DIAGNOSIS — R60.9 EDEMA: ICD-10-CM

## 2023-08-07 DIAGNOSIS — M48.00 SPINAL STENOSIS, UNSPECIFIED SPINAL REGION: ICD-10-CM

## 2023-08-07 LAB
BACTERIA WND CULT: ABNORMAL
BACTERIA WND CULT: ABNORMAL
GRAM STAIN RESULT: ABNORMAL
GRAM STAIN RESULT: ABNORMAL

## 2023-08-07 PROCEDURE — 97162 PT EVAL MOD COMPLEX 30 MIN: CPT | Mod: GP,KX | Performed by: PHYSICAL THERAPIST

## 2023-08-07 PROCEDURE — 97530 THERAPEUTIC ACTIVITIES: CPT | Mod: GP

## 2023-08-07 PROCEDURE — 97110 THERAPEUTIC EXERCISES: CPT | Mod: GP

## 2023-08-07 PROCEDURE — 97535 SELF CARE MNGMENT TRAINING: CPT | Mod: GP,KX | Performed by: PHYSICAL THERAPIST

## 2023-08-07 NOTE — TELEPHONE ENCOUNTER
Please schedule with Dr. Ceja, Dr. Lew, Erlinda Ramos PA-C, or Destinee Armas NP at [facility] for next available appointment.    **For all providers, Erlinda Galloway PA-C, Dr. Gan, Destinee Armas NP or Dr. Lew, please schedule a follow up 2-3 weeks after initial appointment.**    Is the patient able to make their own medical decisions? Yes    Is patient a GARY lift? PLEASE INQUIRE WHEN MAKING THE APPOINTMENT AND PUT IN APPOINTMENT NOTES    Routing to  Wound Healing Scheduling.

## 2023-08-07 NOTE — TELEPHONE ENCOUNTER
Patient's PCA worker Markel called stating he was given [facility] number and told to contact regarding a diabetic wound;    Patient is hearing impaired    Patient has a wound on right leg, under knee above the ankle; burning sensation;becoming darker and still bleeding    Went to ER checked wound said it is not infected.    Markel:[phone]

## 2023-08-07 NOTE — PROGRESS NOTES
PHYSICAL THERAPY EVALUATION  Type of Visit: Evaluation    See electronic medical record for Abuse and Falls Screening details.    Subjective       Presenting condition or subjective complaint: BLE swelling/ wounds  Date of onset: 07/11/23 (order date)    Relevant medical history: Arthritis; Bladder or bowel problems; Cold or hot arm or leg; Diabetes; Hearing problems; Incontinence; Non-healing wounds; Numbness or tingling in perianal area; Overweight; Pain at night or rest; Significant weakness; Vision problems   Dates & types of surgery:      Prior diagnostic imaging/testing results:       Prior therapy history for the same diagnosis, illness or injury: Yes last d/c 2/24/23    Prior Level of Function  Transfers: Assistive equipment and person  Ambulation: Completely dependent  ADL: Assistive equipment and person  IADL:  Has PCA and Son, is responsible for some home upkeep and cooking    Living Environment  Social support: Alone   Type of home: Apartment/condo   Stairs to enter the home: No       Ramp: No   Stairs inside the home: No       Help at home: Self Cares (home health aide/personal care attendant, family, etc); Home management tasks (cooking, cleaning)  Equipment owned:       Employment:      Hobbies/Interests:      Patient goals for therapy: mobility/ transfers, fit of clothing    Pain assessment: Pain present  Reports overall pain 5/10 during session     Objective       EDEMA EVALUATION  Additional history:  Body part affected by edema:    If cancer related, treatment:    If not cancer related, problems with veins or cause of swelling:    Distance able to walk:    Time able to stand:    Sensation problems in hands/feet:      Edema etiology: Chronic Venous Insufficiency    FUNCTIONAL SCALES  LLIS 47    Cognitive Status Examination  Orientation: Oriented to person, place and time   Level of Consciousness: Alert  Follows Commands and Answers Questions: 100% of the time  Personal Safety and Judgement:  Poor  insight and follow through at time without cues/ reminders  Memory:  Not formally tested, but impairments noted    EDEMA  Skin Condition: Pitting, Venous distention, soft edema of BLE; RLE appearing worse than LLE  with open areas to anterior RLE at shin x3 with small hematoma to R lateral leg  Scar: No  Capillary Refill: Symmetrical  Radial Pulse:  Not tested  Dorsal Pedal Pulse:  Not tested  Stemmer Sign: -  Ulceration: Yes L heel ulcer; RLE open x3 to anterior shin    GIRTH MEASUREMENTS: Refer to separate girth measurement flowsheet.     VOLUME UE  Right UE (mL)    Left UE (mL)    UE Volume Comparison    % Difference    VOLUME LE  Right LE (mL)    Left LE (mL)    LE Volume Comparison Deferred measurements to next visit   % Difference    Head/Neck Volume     Trunk Volume    Genital Volume       RANGE OF MOTION:  Decreased knee/ hip extension- please see PT noted  STRENGTH:  Impaired, please see PT notes  POSTURE:  Forward flexed  PALPATION: painful to BLE with neuropathy symptoms  ACTIVITIES OF DAILY LIVING: Requires assist- has PCAs  BED MOBILITY: Min Assist  TRANSFERS: Min Assist, Mod Assist  GAIT/LOCOMOTION: Not currently ambulatory, w/c dependent  BALANCE:  Impaired please see PT note  SENSATION:  Impaired: neuropathy  VASCULAR: Vascular concerns  COORDINATION:     MUSCLE TONE:     Assessment & Plan   CLINICAL IMPRESSIONS  Medical Diagnosis: lymphedema, edema    Treatment Diagnosis: BLE edema   Impression/Assessment: Patient is a 82 year old female with Edema and wound complaints.  The following significant findings have been identified: Pain, Inflammation, Edema, and Wounds . These impairments interfere with their ability to perform self care tasks, recreational activities, household chores, household mobility, and community mobility as compared to previous level of function.     Clinical Decision Making (Complexity):  Clinical Presentation: Evolving/Changing  Clinical Presentation Rationale: based on medical  and personal factors listed in PT evaluation  Clinical Decision Making (Complexity): Moderate complexity    PLAN OF CARE  Treatment Interventions:  Interventions: Manual Therapy, Therapeutic Exercise, Self-Care/Home Management, Gradient Compression Bandaging, Wound Therapy    Long Term Goals     PT Goal 6  Goal Identifier: Volume  Goal Description: Pt will demonstrate at least 5% volume reduction to BLE with use of consistent compression, elevation and wound care to improve mobility and decrease risk of infection  Rationale: to maximize safety and independence with performance of ADLs and functional tasks;to maximize safety and independence with self cares;to maximize safety and independence within the home  Target Date: 11/05/23  PT Goal 7  Goal Identifier: HEP  Goal Description: Pt will demonstrate understanding and completion of targeted HEP of compression, skin care and elevation to reduce risk of infection and further skin breakdown  Rationale: to maximize safety and independence with self cares;to maximize safety and independence within the community;to maximize safety and independence with performance of ADLs and functional tasks  Target Date: 11/05/23  PT Goal 8  Goal Identifier: Compression  Goal Description: Pt will obtain and utilize appropriate BLE compression day/ night to reduce and maintain BLE edema to aid in wound healing and prevent further skin breakdown  Rationale: to maximize safety and independence with performance of ADLs and functional tasks;to maximize safety and independence within the community;to maximize safety and independence with self cares  Target Date: 11/05/23      Frequency of Treatment:    Duration of Treatment: 90 days    Recommended Referrals to Other Professionals:   Education Assessment:        Risks and benefits of evaluation/treatment have been explained.   Patient/Family/caregiver agrees with Plan of Care.     Evaluation Time:     PT Nicole, Moderate Complexity Minutes  (74152): 22       Signing Clinician: Tawnya Lujan PT      [facility]                                                                                   OUTPATIENT PHYSICAL THERAPY      PLAN OF TREATMENT FOR OUTPATIENT REHABILITATION   Patient's Last Name, First Name, Jumana Farfan YOB: 1940   Provider's Name   [facility]   Medical Record No.  [id number]     Onset Date: 07/11/23 (order date)  Start of Care Date: 08/07/23     Medical Diagnosis:  lymphedema, edema      PT Treatment Diagnosis:  BLE edema Plan of Treatment  Frequency/Duration:  / 90 days    Certification date from 08/07/23 to 11/04/23         See note for plan of treatment details and functional goals     Tawnya Lujan, PT                         I CERTIFY THE NEED FOR THESE SERVICES FURNISHED UNDER        THIS PLAN OF TREATMENT AND WHILE UNDER MY CARE .             Physician Signature               Date    X_____________________________________________________                    Referring Provider:  Ananya Tabares      [facility]                                                                                   OUTPATIENT PHYSICAL THERAPY    PLAN OF TREATMENT FOR OUTPATIENT REHABILITATION   Patient's Last Name, First Name, Jumana Farfan YOB: 1940   Provider's Name   [facility]   Medical Record No.  [id number]     Onset Date: 07/11/23 (order date)  Start of Care Date: 08/07/23     Medical Diagnosis:  lymphedema, edema      PT Treatment Diagnosis:  BLE edema Plan of Treatment  Frequency/Duration: 2x a week/ 90 days    Certification date from 08/07/23 to 11/04/23         See note for plan of treatment details and functional goals     Tawnya Lujan, PT                         I CERTIFY THE NEED FOR THESE SERVICES FURNISHED UNDER        THIS PLAN OF TREATMENT AND WHILE  UNDER MY CARE     (Physician attestation of this document indicates review and certification of the therapy plan).                Referring Provider:  Caitlin Osullivan PA-C          Initial Assessment  See Epic Evaluation- Start of Care Date: 08/07/23

## 2023-08-11 ENCOUNTER — TELEPHONE (OUTPATIENT)
Dept: EMERGENCY MEDICINE | Facility: HOSPITAL | Age: 83
End: 2023-08-11
Payer: MEDICARE

## 2023-08-11 RX ORDER — CEFDINIR 300 MG/1
300 CAPSULE ORAL 2 TIMES DAILY
Qty: 14 CAPSULE | Refills: 0 | Status: SHIPPED | OUTPATIENT
Start: 2023-08-11 | End: 2023-08-18

## 2023-08-11 NOTE — ED PROVIDER NOTES
Reviewed patient's wound culture of right leg ulcer. She was not started on any antibiotics at that time. Called patient and discussed - wound is more painful and red. Advised that we start antibiotics. Prescription for Cefdinir sent to her pharmacy. She would like to discuss with her PCP and will contact them before initiating. All questions answered and advised that she follow up with her PCP.      Deana Lloyd PA-C  08/11/23 1198     no

## 2023-08-14 ENCOUNTER — THERAPY VISIT (OUTPATIENT)
Dept: PHYSICAL THERAPY | Facility: CLINIC | Age: 83
End: 2023-08-14
Attending: PHYSICIAN ASSISTANT
Payer: MEDICARE

## 2023-08-14 DIAGNOSIS — M48.00 SPINAL STENOSIS, UNSPECIFIED SPINAL REGION: Primary | ICD-10-CM

## 2023-08-14 DIAGNOSIS — G89.29 CHRONIC LEFT-SIDED LOW BACK PAIN WITH LEFT-SIDED SCIATICA: ICD-10-CM

## 2023-08-14 DIAGNOSIS — M54.42 CHRONIC LEFT-SIDED LOW BACK PAIN WITH LEFT-SIDED SCIATICA: ICD-10-CM

## 2023-08-14 PROCEDURE — 97110 THERAPEUTIC EXERCISES: CPT | Mod: GP

## 2023-08-14 PROCEDURE — 97530 THERAPEUTIC ACTIVITIES: CPT | Mod: GP

## 2023-08-21 ENCOUNTER — THERAPY VISIT (OUTPATIENT)
Dept: PHYSICAL THERAPY | Facility: CLINIC | Age: 83
End: 2023-08-21
Attending: PHYSICIAN ASSISTANT
Payer: MEDICARE

## 2023-08-21 ENCOUNTER — THERAPY VISIT (OUTPATIENT)
Dept: PHYSICAL THERAPY | Facility: CLINIC | Age: 83
End: 2023-08-21
Attending: NURSE PRACTITIONER
Payer: MEDICARE

## 2023-08-21 DIAGNOSIS — I89.0 LYMPHEDEMA: Primary | ICD-10-CM

## 2023-08-21 DIAGNOSIS — G89.29 CHRONIC LEFT-SIDED LOW BACK PAIN WITH LEFT-SIDED SCIATICA: ICD-10-CM

## 2023-08-21 DIAGNOSIS — M54.42 CHRONIC LEFT-SIDED LOW BACK PAIN WITH LEFT-SIDED SCIATICA: ICD-10-CM

## 2023-08-21 DIAGNOSIS — M48.00 SPINAL STENOSIS, UNSPECIFIED SPINAL REGION: Primary | ICD-10-CM

## 2023-08-21 DIAGNOSIS — R53.1 WEAKNESS: ICD-10-CM

## 2023-08-21 PROCEDURE — 97110 THERAPEUTIC EXERCISES: CPT | Mod: GP

## 2023-08-21 PROCEDURE — 97140 MANUAL THERAPY 1/> REGIONS: CPT | Mod: GP,KX | Performed by: PHYSICAL THERAPIST

## 2023-08-25 ENCOUNTER — TELEPHONE (OUTPATIENT)
Dept: PHYSICAL THERAPY | Facility: CLINIC | Age: 83
End: 2023-08-25
Payer: MEDICARE

## 2023-09-18 ENCOUNTER — TELEPHONE (OUTPATIENT)
Dept: WOUND CARE | Facility: CLINIC | Age: 83
End: 2023-09-18
Payer: MEDICARE

## 2023-09-18 NOTE — TELEPHONE ENCOUNTER
Returned call to Doug. She gave directions that the patient will need a laptop for the appointment on 9/27/23 and a PC with working audio. She also is sending an email to this writer with additional instructions.

## 2023-09-18 NOTE — TELEPHONE ENCOUNTER
Doug from [facility] called to give I information on how to set up for this patient's upcoming appointment.  [phone] option 2

## 2023-09-20 ENCOUNTER — APPOINTMENT (OUTPATIENT)
Dept: RADIOLOGY | Facility: CLINIC | Age: 83
End: 2023-09-20
Attending: STUDENT IN AN ORGANIZED HEALTH CARE EDUCATION/TRAINING PROGRAM
Payer: MEDICARE

## 2023-09-20 ENCOUNTER — NURSE TRIAGE (OUTPATIENT)
Dept: NURSING | Facility: CLINIC | Age: 83
End: 2023-09-20
Payer: MEDICARE

## 2023-09-20 ENCOUNTER — HOSPITAL ENCOUNTER (EMERGENCY)
Facility: CLINIC | Age: 83
Discharge: HOME | End: 2023-09-20
Attending: STUDENT IN AN ORGANIZED HEALTH CARE EDUCATION/TRAINING PROGRAM | Admitting: STUDENT IN AN ORGANIZED HEALTH CARE EDUCATION/TRAINING PROGRAM
Payer: MEDICARE

## 2023-09-20 VITALS
BODY MASS INDEX: 31.65 KG/M2 | RESPIRATION RATE: 20 BRPM | DIASTOLIC BLOOD PRESSURE: 58 MMHG | TEMPERATURE: 97 F | SYSTOLIC BLOOD PRESSURE: 119 MMHG | OXYGEN SATURATION: 98 % | HEIGHT: 65 IN | WEIGHT: 190 LBS | HEART RATE: 90 BPM

## 2023-09-20 DIAGNOSIS — S63.630A SPRAIN OF INTERPHALANGEAL JOINT OF RIGHT INDEX FINGER, INITIAL ENCOUNTER: ICD-10-CM

## 2023-09-20 DIAGNOSIS — B37.31 CANDIDIASIS OF VAGINA: ICD-10-CM

## 2023-09-20 PROCEDURE — 73130 X-RAY EXAM OF HAND: CPT | Mod: RT

## 2023-09-20 PROCEDURE — 29130 APPL FINGER SPLINT STATIC: CPT | Mod: F6

## 2023-09-20 PROCEDURE — 250N000013 HC RX MED GY IP 250 OP 250 PS 637: Performed by: STUDENT IN AN ORGANIZED HEALTH CARE EDUCATION/TRAINING PROGRAM

## 2023-09-20 PROCEDURE — 99284 EMERGENCY DEPT VISIT MOD MDM: CPT

## 2023-09-20 RX ORDER — ACETAMINOPHEN 325 MG/1
975 TABLET ORAL ONCE
Status: COMPLETED | OUTPATIENT
Start: 2023-09-20 | End: 2023-09-20

## 2023-09-20 RX ORDER — FLUCONAZOLE 150 MG/1
150 TABLET ORAL
Qty: 2 TABLET | Refills: 0 | Status: SHIPPED | OUTPATIENT
Start: 2023-09-20 | End: 2023-09-24

## 2023-09-20 RX ADMIN — ACETAMINOPHEN 975 MG: 325 TABLET ORAL at 16:20

## 2023-09-20 ASSESSMENT — ACTIVITIES OF DAILY LIVING (ADL): ADLS_ACUITY_SCORE: 33

## 2023-09-20 NOTE — ED TRIAGE NOTES
"The patient presents to the ED with multiple complaints. The patient has an ulcer on her right great toe that she feels is worsening. The patient also has an injury/pain to her right pointer finger that began after she attempted to open a jar and twisted it close to 3 weeks ago. The patient also reports vaginal redness, irritation and \"bumps\" that began a month ago.         "

## 2023-09-20 NOTE — TELEPHONE ENCOUNTER
Consent on file to speak with the patient's son Davie.     Nurse Triage SBAR    Is this a 2nd Level Triage? YES, LICENSED PRACTITIONER REVIEW IS REQUIRED    Situation: Vaginal sore    Background: patient's son calling with patient on the call as well.  She states that she has a sore on the outer lip of her vagina that is extremely painful. She stats that it also has a very foul odor and is red. States there is some itching. She denies fever, denies recent injury to that area. Denies any UTI symptoms.     Assessment: Painful vaginal sore    Protocol Recommended Disposition:   See in Office Today    Recommendation: Patient's primary clinic is unable to see her today. Her son will take her to urgent care this morning.      N/A    ARTURO OWENS RN    Does the patient meet one of the following criteria for ADS visit consideration? No    Reason for Disposition   MILD-MODERATE pain and present > 24 hours  (Exception: Chronic pain.)    Additional Information   Negative: Sounds like a life-threatening emergency to the triager   Negative: Followed a genital area injury (e.g., vagina, vulva)   Negative: Vaginal bleeding is main symptom   Negative: Vaginal discharge is main symptom   Negative: Pain or burning with passing urine (urination) is main symptom   Negative: Menstrual cramps is main symptom   Negative: Abdomen pain is main symptom   Negative: Pubic lice suspected   Negative: Itching or rash of female genital area (e.g., labia, vagina, vulva)   Negative: Pregnant and labor suspected   Negative: Patient sounds very sick or weak to the triager   Negative: SEVERE pain and not improved 2 hours after pain medicine   Negative: Genital area looks infected (e.g., draining sore, spreading redness) and fever   Negative: Something is hanging out of the vagina and can't easily be pushed back inside    Protocols used: Vaginal Symptoms-A-OH

## 2023-09-20 NOTE — ED PROVIDER NOTES
EMERGENCY DEPARTMENT ENCOUNTER      NAME: Jumana Yang  AGE: 82 year old female  YOB: 1940  MRN: [id number]  EVALUATION DATE & TIME: No admission date for patient encounter.    PCP: Ananya Mclean    ED PROVIDER: Hiram Espinoza MD      Chief Complaint   Patient presents with    Vaginal Pain    Foot Pain    Hand Injury         FINAL IMPRESSION:  1. Sprain of interphalangeal joint of right index finger, initial encounter    2. Candidiasis of vagina          ED COURSE & MEDICAL DECISION MAKING:    Pertinent Labs & Imaging studies reviewed. (See chart for details)  82 year old female presents to the Emergency Department for evaluation of several complaints including finger pain, foot pain and vaginal discomfort.    Patient states that for about 3 weeks has been having some right-sided index finger pain which she thinks might be related to twisting a jar and trying to open it.  No other injuries or inciting trauma for finger pain but hurts when she moves it.  She also has noted a ulcer on the heel of her left foot that has become more painful over the past few days.  She has a history of somewhat chronic diabetic foot ulcers and states that this also was healing well until recently when when it became more painful and she noticed more breakdown in the skin.  She denies any fevers or chills at home.  No purulent discharge from the wound.  No redness spreading up the foot or ankle.  She also notes vaginal discomfort that has been worsening over the past few days and has noticed some painful bumps around her vagina and has had some foul-smelling discharge as well.    On exam here patient is in no acute distress.  She is hemodynamically stable and afebrile.  Her lungs are clear without any wheezes or rails.  No abdominal tenderness or guarding.  Examination of the right hand shows some tenderness over the right index finger between the MCP and the PIP but no obvious deformity or malalignment.  She is  able to fully flex and extend with minimal discomfort.  Brisk cap refill all fingers on the right hand.  2+ radial pulse.  No breaks in the skin.  No significant erythema or edema.  As for her feet she does have what appears to be a diabetic foot ulcer on the left heel with a small area of central necrosis but no significant surrounding erythema or fluctuance.  No crepitus.  I am not able to probe to bone.  On  exam patient does have some vulvar erythema bilaterally as well as cottage cheese white discharge consistent with vaginal candidiasis.    We will obtain a x-ray of the right hand to evaluate for any fracture or dislocation of the right finger.  Finger was placed in a prefabricated splint for comfort.  On exam she does have what appears to be a diabetic ulcer on the left foot but no significant surrounding erythema she is afebrile here and have a lower suspicion for infected ulcer.  She is following up with her typical wound care doctor in a few days and we will hold off on empiric treatment at this point in time.  As for her vaginal discomfort physical exam here is consistent with vaginal candidiasis.  However there is no significant abscess or fluctuance.  No erythema extending into the perineum and doubt Jannet's.  Suspect this been exacerbated by her underlying diabetes.  We will place her on a course of fluconazole for vaginal candidiasis.  Otherwise patient safe for discharge home and should keep her upcoming follow-up appointment with her doctor.  Should return to the emergency department she develops fevers, worsening redness or swelling surrounding her foot ulcer redness burning of the leg, intractable vomiting or other new or concerning symptoms.         3:12 PM I met and evaluated the patient.   4:51 PM Rechecked and updated the patient on imaging results. Discussed plan for discharge.     Medical Decision Making    History:  Supplemental history from: Family Member/Significant Other  External  Record(s) reviewed: Documented in chart, if applicable.    Work Up:  Chart documentation includes differential considered and any EKGs or imaging independently interpreted by provider, where specified.  In additional to work up documented, I considered the following work up: Documented in chart, if applicable.    External consultation:  Discussion of management with another provider: Documented in chart, if applicable    Complicating factors:  Care impacted by chronic illness: Chronic Kidney Disease, Diabetes, Hyperlipidemia, and Hypertension  Care affected by social determinants of health: N/A    Disposition considerations: Discharge. I prescribed additional prescription strength medication(s) as charted. See documentation for any additional details.       At the conclusion of the encounter I discussed the results of all of the tests and the disposition. The questions were answered. The patient or family acknowledged understanding and was agreeable with the care plan.         MEDICATIONS GIVEN IN THE EMERGENCY:  Medications   acetaminophen (TYLENOL) tablet 975 mg (975 mg Oral $Given 9/20/23 1620)       NEW PRESCRIPTIONS STARTED AT TODAY'S ER VISIT  New Prescriptions    FLUCONAZOLE (DIFLUCAN) 150 MG TABLET    Take 1 tablet (150 mg) by mouth every 3 days for 2 doses          =================================================================    HPI    Patient information was obtained from: Patient    Use of : N/A       Jumana Yang is a 82 year old female with a pertinent history of Meniere's disease, hypertension, hyperlipidemia, T2DM, hypothyroidism, and chronic kidney disease stage 3 who presents to this ED by walk in for evaluation of multiple complaints.    The patient reports an ulcer to the posterior aspect of her heal that has been developing for quite awhile. No recent injury, but she does note a history of diabetes and states that her sugar levels have recently been high. She has plans to see a  wound specialist on 9/27. Similarly, she notes an ulcer on her right great toe that has since caused some bleeding. No known trauma to toe.    The patient also reports that she injured her right index finger while opening a jar three weeks ago. Endorses throbbing pain.    Additionally, the patient reports vaginal pain and itching for the past month which have stemmed from irritated bumps.     She otherwise denies a fever or any other complaints at this time.    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    Hepatic abscess     Hyperlipidemia     Hypertension     Hypothyroidism     Irritable bowel syndrome     Meniere disease     Neuropathy     Noninfectious ileitis     Peritoneal abscess (H)     Spinal stenosis of lumbar region     Steroid long-term use     Vaginitis, atrophic, postmenopausal     Vitamin D deficiency        PAST SURGICAL HISTORY:  Past Surgical History:   Procedure Laterality Date    CATARACT IOL, RT/LT Left 7/2013    FOOT SURGERY      toe    GI SURGERY      IR LUMBAR/SACRAL TRANSFOR INJ BILATERAL Bilateral 3/11/2022    LAPAROSCOPIC HEPATECTOMY PARTIAL  11/4/2013    Procedure: LAPAROSCOPIC HEPATECTOMY PARTIAL;  Laparoscopic Debridement of Liver Abcess;  Surgeon: Sepideh Green MD;  Location: [facility] OR    ORTHOPEDIC SURGERY      PICC INSERTION  8/28/2013    5fr DL Power PICC, 41cm (1cm external length) in the R lateral brachial vein with tip in the SVC RA junction.    RECTAL SURGERY      1970s    RELEASE CARPAL TUNNEL Left 3/21/2023    Procedure: LEFT OPEN CARPAL  TUNNEL RELEASE. LEFT RING FINGER A1 PULLEY RELEASE;  Surgeon: Claire Hamilton MD;  Location: [facility] OR    VASCULAR SURGERY             CURRENT MEDICATIONS:    fluconazole (DIFLUCAN) 150 MG tablet  acetaminophen (TYLENOL) 325 MG tablet  blood glucose (ACCU-CHEK FASTCLIX) lancing device  blood glucose (CONTOUR TEST) test strip  busPIRone (BUSPAR) 7.5 MG tablet  calamine 8-8 % lotion  clobetasol (TEMOVATE) 0.05 % external cream  CONTOUR NEXT TEST test strip  cyanocobalamin 1000 MCG SUBL  diazepam (VALIUM) 2 MG tablet  DULoxetine (CYMBALTA) 60 MG capsule  glipiZIDE (GLUCOTROL) 5 MG tablet  hydrOXYzine (ATARAX) 25 MG tablet  JARDIANCE 25 MG TABS tablet  lactulose (CHRONULAC) 10 GM/15ML solution  lamoTRIgine (LAMICTAL) 100 MG tablet  lisinopril (ZESTRIL) 5 MG tablet  melatonin 3 MG tablet  metFORMIN (GLUCOPHAGE) 500 MG tablet  methocarbamol (ROBAXIN) 500 MG tablet  miconazole (MICATIN) 2 % external powder  naloxone (NARCAN) 4 MG/0.1ML nasal spray  nystatin (MYCOSTATIN) 979772 UNIT/ML suspension  omeprazole (PRILOSEC) 20 MG DR capsule  ondansetron (ZOFRAN ODT) 4 MG ODT tab  order for DME  oxybutynin ER (DITROPAN XL) 15 MG 24 hr tablet  oxyCODONE (ROXICODONE) 5 MG tablet  polyethylene glycol (MIRALAX) 17 GM/Dose powder  predniSONE (DELTASONE) 1 MG tablet  predniSONE (DELTASONE) 5 MG tablet  pregabalin (LYRICA) 50 MG capsule  senna-docusate (SENOKOT-S/PERICOLACE) 8.6-50 MG tablet  triamcinolone (KENALOG) 0.1 % external cream  triamterene-HCTZ (DYAZIDE) 37.5-25 MG capsule  vitamin D3 (CHOLECALCIFEROL) 50 mcg (2000 units) tablet        ALLERGIES:  Allergies   Allergen Reactions    Propofol Nausea and Vomiting    Shellfish Allergy      Other reaction(s): Dizziness    Capsaicin Rash and Blisters       FAMILY HISTORY:  Family History   Problem Relation Age of Onset    Cerebrovascular Disease Mother     Heart Disease Mother     Heart Disease Father     Heart Disease Brother     Diabetes No family hx of     Coronary Artery Disease  "No family hx of     Hypertension No family hx of     Hyperlipidemia No family hx of     Breast Cancer No family hx of     Colon Cancer No family hx of     Prostate Cancer No family hx of     Other Cancer No family hx of     Depression No family hx of     Anxiety Disorder No family hx of     Mental Illness No family hx of     Substance Abuse No family hx of     Anesthesia Reaction No family hx of     Asthma No family hx of     Osteoporosis No family hx of     Genetic Disorder No family hx of     Thyroid Disease No family hx of     Obesity No family hx of     Unknown/Adopted No family hx of        SOCIAL HISTORY:   Social History     Socioeconomic History    Marital status:    Tobacco Use    Smoking status: Former     Types: Cigarettes     Quit date: 11/15/1987     Years since quittin.8    Smokeless tobacco: Former   Substance and Sexual Activity    Alcohol use: Not Currently     Alcohol/week: 0.0 standard drinks of alcohol     Comment: MALISSA white since     Drug use: No     Comment: used marijuanna in the past    Sexual activity: Never     Partners: Male   Other Topics Concern    Parent/sibling w/ CABG, MI or angioplasty before 65F 55M? No   Social History Narrative    ** Merged History Encounter **            VITALS:  /58   Pulse 90   Temp 97  F (36.1  C) (Temporal)   Resp 20   Ht 1.651 m (5' 5\")   Wt 86.2 kg (190 lb)   LMP  (LMP Unknown)   SpO2 98%   BMI 31.62 kg/m      PHYSICAL EXAM    Constitutional: Well developed, Well nourished, NAD,  HENT: Normocephalic, Atraumatic,  mucous membranes moist,   Neck- trachea midline, No stridor.    Eyes:EOMI, Conjunctiva normal, No discharge.   Respiratory: Normal breath sounds, No respiratory distress, No wheezing.   Cardiovascular: Normal heart rate, Regular rhythm, No murmurs.   Abdominal: Soft, No tenderness, No rebound or guarding.     Musculoskeletal: no deformity or malalignment. 1+ DP pulses bilaterally.   Integument: Warm, Dry. " Ulceration to left posterior calcaneus. Minimal surrounding erythema. No crepitus. Cracked skin to right foot along cuticles. Hemostatically stable. No erythema.   Neurologic: Alert & oriented x 3   Psychiatric: Affect normal, Cooperative.      LAB:  All pertinent labs reviewed and interpreted.  Results for orders placed or performed during the hospital encounter of 09/20/23   XR Hand Right G/E 3 Views    Impression    IMPRESSION: No fractures are evident. Normal joint spacing and alignment. Osteopenia.       RADIOLOGY:  Reviewed all pertinent imaging. Please see official radiology report.  XR Hand Right G/E 3 Views   Final Result   IMPRESSION: No fractures are evident. Normal joint spacing and alignment. Osteopenia.          PROCEDURES:       PROCEDURE: Splint Placement   INDICATIONS: Right index finger sprain   PROCEDURE PROVIDER: Dr Hiram Espinoza   NOTE:  A  prefabricated foam finger splint  splint was applied to the  right index finger  by the above provider. As noted in the physical exam, distal CMS was intact prior to placement. The splint was checked and the fit was adjusted to ensure proper positioning after placement. Sensation and circulation, as well as motor function, are unchanged after splint placement and the patient is more comfortable with the splint in place.          [facility] Documentation:   CMS Diagnoses:              I, aNzia Read, am serving as a scribe to document services personally performed by Hiram Espinoza MD based on my observation and the provider's statements to me. I, Hiram Espinoza MD, attest that Nazia Read is acting in a scribe capacity, has observed my performance of the services and has documented them in accordance with my direction.    Hiram Espinoza MD  [facility] EMERGENCY ROOM  [address] [id number] [phone]      Hiram Espinoza MD  09/20/23 [id number]

## 2023-09-27 ENCOUNTER — TELEPHONE (OUTPATIENT)
Dept: WOUND CARE | Facility: CLINIC | Age: 83
End: 2023-09-27
Payer: MEDICARE

## 2023-09-27 ENCOUNTER — HOSPITAL ENCOUNTER (OUTPATIENT)
Dept: WOUND CARE | Facility: CLINIC | Age: 83
Discharge: HOME | End: 2023-09-27
Attending: FAMILY MEDICINE | Admitting: FAMILY MEDICINE
Payer: MEDICARE

## 2023-09-27 ENCOUNTER — TELEPHONE (OUTPATIENT)
Dept: WOUND CARE | Facility: CLINIC | Age: 83
End: 2023-09-27

## 2023-09-27 VITALS — TEMPERATURE: 96.6 F | DIASTOLIC BLOOD PRESSURE: 99 MMHG | HEART RATE: 79 BPM | SYSTOLIC BLOOD PRESSURE: 128 MMHG

## 2023-09-27 DIAGNOSIS — S81.801A WOUND OF RIGHT LOWER EXTREMITY, INITIAL ENCOUNTER: Primary | ICD-10-CM

## 2023-09-27 DIAGNOSIS — L97.422 ULCER OF LEFT HEEL, WITH FAT LAYER EXPOSED (H): ICD-10-CM

## 2023-09-27 DIAGNOSIS — L89.623 STAGE III PRESSURE ULCER OF LEFT HEEL (H): ICD-10-CM

## 2023-09-27 DIAGNOSIS — L97.912 ULCER OF RIGHT LOWER EXTREMITY WITH FAT LAYER EXPOSED (H): ICD-10-CM

## 2023-09-27 PROCEDURE — 97602 WOUND(S) CARE NON-SELECTIVE: CPT

## 2023-09-27 PROCEDURE — G0463 HOSPITAL OUTPT CLINIC VISIT: HCPCS | Mod: 25

## 2023-09-27 PROCEDURE — 99204 OFFICE O/P NEW MOD 45 MIN: CPT | Performed by: FAMILY MEDICINE

## 2023-09-27 NOTE — TELEPHONE ENCOUNTER
Please schedule patient for Bilateral JOAQUIN      Mobility:    Does the patient use an assistive device? (i.e. walker, wheelchair) Wheelchair    Does the patient need assistance getting on/off the ultrasound table? Yes    Does the patient need assistance undressing/redressing the wounds? Yes         Wound wraps/dressings:    Is a 2 layer wrap being used? No (If yes, vein staff to assist patient in cutting this off.)    Is [place] being used? Yes (If yes, vein team not to cut, but able to remove dressing.)     Will the patient bring dressings to reapply after US? Yes    o   If not, can they be sent home with a chux to have home care or family member redress? Yes    o   Do they need a coordinated appointment for a nurse visit at wound to redress? No    If coordinated appointment required, wound nurse to alert charge nurse or  staff who will call Vein  at [phone] and/or Vascular schedulers [phone] to schedule the testing needed and coordinated wound clinic visit.    The dressing can always be removed if testing is ordered.     Please include in the order to be scheduled by [facility] or [facility], whichever is appropriate. Route to [facility] and/or [facility] scheduling pools. Route even if the patient is a GARY lift. They will help to get the patient scheduled at the appropriate place.    Routing to [facility] Appointment Scheduling Pool for JOAQUIN/arterial duplex orders     Woo Lew M.D.

## 2023-09-27 NOTE — ADDENDUM NOTE
Encounter addended by: Dionna Pradhan RN on: 9/27/2023 2:30 PM   Actions taken: Order list changed, Diagnosis association updated

## 2023-09-27 NOTE — PROGRESS NOTES
Wound Clinic Note         Visit date: 09/27/2023       Cheif Complaint:     Jumana Yang is a 83 year old   female had concerns including WOUND CARE.  The patient has lower extremity edema, a left heel wound and a right leg wound.         HISTORY OF PRESENT ILLNESS:    Jumana Yang reports the wound has been present since early August.  The wound began without a clear cause.   Prior to this she has never had other difficult to heal wounds on the lower extremities.    She has not been applying any bandages to the area recently but she does wear edema wear stockings to help control her swelling.  There is been light serous drainage from the wound areas.      The pateint denies fevers or chills.  They report the pain from the wound has been 8/10 and has remained about the same recently.      The patient reports they currently do not have any routine for elevating their legs.  and The patient confirms they do sleep in a bed.     Today the patient reports maintaining a regular diet without special attention to protein.        The patient denies a history of smoking or chronic steroid use.  and The patient confirms having diabetes and reports the blood sugars are above 200 at least once a day.  and The patient is working with a provider who manages the diabetes to better control the blood sugars.         The patient has not had any symptoms of infection relating to the wound recently and is not currently on antibiotics.       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       Surgical Hx:   Past Surgical History:   Procedure Laterality Date    CATARACT IOL, RT/LT Left 7/2013    FOOT SURGERY      toe    GI SURGERY      IR LUMBAR/SACRAL TRANSFOR INJ BILATERAL Bilateral 3/11/2022    LAPAROSCOPIC HEPATECTOMY PARTIAL  11/4/2013    Procedure: LAPAROSCOPIC HEPATECTOMY PARTIAL;  Laparoscopic Debridement of Liver Abcess;  Surgeon: Sepideh Green MD;  Location: [facility] OR    ORTHOPEDIC SURGERY      PICC INSERTION  8/28/2013    5fr DL Power PICC, 41cm (1cm external length) in the R lateral brachial vein with tip in the SVC RA junction.    RECTAL SURGERY      1970s    RELEASE CARPAL TUNNEL Left 3/21/2023    Procedure: LEFT OPEN CARPAL TUNNEL RELEASE. LEFT RING FINGER A1 PULLEY RELEASE;  Surgeon: Claire Hamilton MD;  Location: [facility] OR    VASCULAR SURGERY            Allergies:    Allergies   Allergen Reactions    Propofol Nausea and Vomiting    Shellfish Allergy      Other reaction(s): Dizziness    Capsaicin Rash and Blisters              Medication History:    Current Outpatient Medications   Medication Sig    acetaminophen (TYLENOL) 325 MG tablet Take 2 tablets (650 mg) by mouth every 4 hours as needed for other (For optimal non-opioid multimodal pain management to improve pain control.) (Patient not taking: Reported on 7/17/2023)    blood glucose (ACCU-CHEK FASTCLIX) lancing device FOR TESTING ONCE DAILY. DX  E11.9 TYPE 2 DIABETES    blood glucose (CONTOUR TEST) test strip TESTING EVERY DAY DX  E11.9    busPIRone (BUSPAR) 7.5 MG tablet Take 7.5 mg by mouth 3 times daily    calamine 8-8 %  lotion Apply topically every hour as needed for itching (Patient not taking: Reported on 7/17/2023)    clobetasol (TEMOVATE) 0.05 % external cream Apply topically 2 times daily    CONTOUR NEXT TEST test strip TESTING EVERY DAY DX  E11.9    cyanocobalamin 1000 MCG SUBL Place 1,000 mcg under the tongue daily    diazepam (VALIUM) 2 MG tablet Take 1 tablet (2 mg) by mouth every 6 hours as needed for anxiety    DULoxetine (CYMBALTA) 60 MG capsule Take 60 mg by mouth every morning    glipiZIDE (GLUCOTROL) 5 MG tablet Take 1 tablet (5 mg) by mouth 2 times daily (before meals)    hydrOXYzine (ATARAX) 25 MG tablet Take 25 mg by mouth 3 times daily as needed for anxiety    JARDIANCE 25 MG TABS tablet Take 25 mg by mouth every morning    lactulose (CHRONULAC) 10 GM/15ML solution Take 15 mLs (10 g) by mouth every 8 hours as needed for constipation (Patient not taking: Reported on 7/17/2023)    lamoTRIgine (LAMICTAL) 100 MG tablet Take 100 mg by mouth 2 times daily    lisinopril (ZESTRIL) 5 MG tablet Take 5 mg by mouth daily    melatonin 3 MG tablet Take 1 tablet (3 mg) by mouth nightly as needed for sleep (Patient not taking: Reported on 7/17/2023)    metFORMIN (GLUCOPHAGE) 500 MG tablet Take 500 mg by mouth 2 times daily (with meals)    methocarbamol (ROBAXIN) 500 MG tablet Take 1 tablet (500 mg) by mouth 3 times daily as needed for muscle spasms    miconazole (MICATIN) 2 % external powder Apply topically 2 times daily as needed To the skin fold    naloxone (NARCAN) 4 MG/0.1ML nasal spray Spray 1 spray (4 mg) into one nostril alternating nostrils as needed for opioid reversal every 2-3 minutes until assistance arrives    nystatin (MYCOSTATIN) 357751 UNIT/ML suspension Take 5 mLs (500,000 Units) by mouth 4 times daily x7 days through 4/23    omeprazole (PRILOSEC) 20 MG DR capsule Take 20 mg by mouth daily    ondansetron (ZOFRAN ODT) 4 MG ODT tab Take 1 tablet (4 mg) by mouth every 6 hours as needed for nausea or vomiting     order for DME Equipment being ordered: wrist brace    oxybutynin ER (DITROPAN XL) 15 MG 24 hr tablet Take 15 mg by mouth every morning    oxyCODONE (ROXICODONE) 5 MG tablet Take 1-2 tablets (5-10 mg) by mouth every 6 hours as needed for moderate pain 1 if pain rated 1-5 & 2 if 6-10.    polyethylene glycol (MIRALAX) 17 GM/Dose powder Take 17 g by mouth 2 times daily as needed for constipation    predniSONE (DELTASONE) 1 MG tablet Take 1 mg by mouth every morning (In addition to the 5 mg dose; 1 mg + 5 mg = 6 mg)    predniSONE (DELTASONE) 5 MG tablet Take 5 mg by mouth every morning (In addition to the 1 mg dose; 5 mg + 1 mg = 6 mg)    pregabalin (LYRICA) 50 MG capsule Take 1 capsule (50 mg) by mouth 2 times daily    senna-docusate (SENOKOT-S/PERICOLACE) 8.6-50 MG tablet Take 1 tablet by mouth 2 times daily as needed for constipation    triamcinolone (KENALOG) 0.1 % external cream Apply topically 2 times daily as needed    triamterene-HCTZ (DYAZIDE) 37.5-25 MG capsule Take 1 capsule by mouth every morning Hold for SBP<110    vitamin D3 (CHOLECALCIFEROL) 50 mcg (2000 units) tablet Take 50 mcg by mouth daily     No current facility-administered medications for this encounter.         Tobacco History:  reports that she quit smoking about 35 years ago. Her smoking use included cigarettes. She has quit using smokeless tobacco.       REVIEW OF SYMPTOMS:   The review of systems was negative except as noted in the HPI.           PHYSICAL EXAMINATION:     BP (!) 128/99 (BP Location: Left arm, Patient Position: Sitting)   Pulse 79   Temp (!) 96.6  F (35.9  C) (Temporal)   LMP  (LMP Unknown)            GENERAL: The patient overall appears well and is no acute distress.   HEAD: normocephalic   EYES: Sclera and conjunctiva clear   NECK: no obvious masses   LUNGS: breathing is unlabored.   EXTREMITIES: No clubbing, cyanosis or edema   SKIN: No rashes or other abnormalities except as noted under the Wound section below.    NEUROLOGICAL: normal motor and sensory function   EDEMA: Moderate  and Lymphedema       WOUND: The wound appears healthy with no sign of infection.   Wound bed: granulation tissue  Periwound: healthy intact skin  She has a wound on the left heel consistent with a stage III pressure injury.  She has a very superficial wound on the right anterior shin consistent with a ruptured water blister.      Also see below for wound details:       Circumferential volume measures:             No data to display                Ulceration(s)/Wound(s):   Please see the media tab under the chart review for pictures of the wounds.  Nursing staff removed dressings and cleansed wound.    Wound (used by [facility] only) 09/27/23 1323 Right anterior;lower leg ulceration, venous (Active)   Thickness/Stage full thickness 09/27/23 1300   Base red;yellow;slough;pink 09/27/23 1300   Periwound edematous 09/27/23 1300   Periwound Temperature warm 09/27/23 1300   Periwound Skin Turgor soft 09/27/23 1300   Edges open 09/27/23 1300   Length (cm) 0.3 09/27/23 1300   Width (cm) 0.4 09/27/23 1300   Depth (cm) 0.2 09/27/23 1300   Wound (cm^2) 0.12 cm^2 09/27/23 1300   Wound Volume (cm^3) 0.02 cm^3 09/27/23 1300   Drainage Characteristics/Odor serosanguineous 09/27/23 1300   Drainage Amount moderate 09/27/23 1300   Care, Wound non-select wound debridement performed 09/27/23 1300       Wound (used by [facility] only) 09/27/23 1326 Left heel pressure injury (Active)   Thickness/Stage Stage 3 09/27/23 1300   Base pink;yellow 09/27/23 1300   Periwound macerated 09/27/23 1300   Periwound Temperature warm 09/27/23 1300   Periwound Skin Turgor soft 09/27/23 1300   Edges open 09/27/23 1300   Length (cm) 1.5 09/27/23 1300   Width (cm) 1.9 09/27/23 1300   Depth (cm) 0.2 09/27/23 1300   Wound (cm^2) 2.85 cm^2 09/27/23 1300   Wound Volume (cm^3) 0.57 cm^3 09/27/23 1300   Drainage Characteristics/Odor serosanguineous 09/27/23 1300   Drainage Amount moderate 09/27/23 1300    Care, Wound non-select wound debridement performed 09/27/23 1300             Recent Labs   Lab Test 03/21/23  1025 03/08/22  0626 07/20/21  0614   A1C 6.9* 8.8* 10.8*          Recent Labs   Lab Test 05/22/23  1316 07/03/22  2204 06/16/22  2302   ALBUMIN 3.5 3.8 3.6              No sharp debridement performed today.                  ASSESSMENT:   This is a 83 year old  female with a stage III left heel pressure injury and a right leg wound, the patient also has lower extremity edema which was also managed during today's clinic visit.          PLAN:   We will bandage both areas with a Mepilex bandage change 3 times a week and then she will continue to use her edema wear stockings every day to help control her swelling.  I have explained to the patient that one of the most important things we can do to help the heel wound to heal is to keep pressure off of the area.  I recommend that she float the heels while she is in bed.  She should also be vigilant during the day to make sure that there is no pressure applied to the wound area of the heel.  She has a history of peripheral artery disease and her left foot is a bit cool so we will order an JOAQUIN to assess for arterial insufficiency.    Separate from the wound care instructions we then discussed management strategies for lower extremity edema.  I explained the keys for managing lower extremity edema are compression and elevation.  I explained to the patient today that controlling the edema is probably the most important thing we can do to help heal the wound.  I have specifically recommended that they lay down with their legs above the level of the heart for 30 minutes at least twice a day.     I have explained to the patient the importance of protein intake to wound healing.  I have explained that increasing protein intake will speed wound healing.  We discussed several types of food that are high in protein and the wound care nurse gave the patient a handout that  summarizes this information.  In addition to further speed wound healing I have encouraged the patient to take a protein supplement.   The patient will return to the wound clinic in 2 to 3 weeks to see me again.        45 minutes spent on the date of the encounter doing chart review, history and exam, documentation and further activities per the note, this time excludes any procedure time      Woo Lew MD  09/27/2023   2:21 PM   [facility]/Wound  [phone]    This note was electronically signed by Woo Lew MD        Further instructions from your care team         Jumana Yang      1940    A DME order for supplies has been placed to [facility]. If there are any issues with your order including not receiving the order please call [facility] at [phone] option 1. They can also provide a tracking number for you if you had supplies shipped to you.    Dressing changes outside of clinic are being performed by Patient and PCA    You have been referred to [facility] for JOAQUIN testing. Please call [phone] to schedule.      Wound Dressing Change: left heel and right anterior lower leg   - Wash your hands with soap and water before you begin your dressing change and prepare a clean surface for dressings.  -Cleanse with mild unscented soap and water (such as Cetaphil, Cerave or Dove)   -Apply 1 4x4 mepilex to heel and 1 4x4 mepilex to right anterior lower leg  -pull up navy stripe edemawear from toes to behind the knee  -Change three times per week   -bandages can not get wet (should not shower with them on)so take them off before you shower. You will run out of bandages as insurance only covers so many per month if you change them more than three times per week.      Elevation:    It is recommended that you elevate your legs above the level of your heart for 30 minutes: approximately 2-3 times each day     Ways to do this:   -  Lay on the couch or your bed and prop your legs up on pillows. Pillows should be under your calves in bed so your feet are floating in the air and no pressure is on the wound.   - Recline back as far as you can go in your recliner and prop your legs on pillows.     Doing these things will help reduce the edema in your legs.      Then apply Navy Stripe EdemaWear from toes to knee. EdemaWear should be worn 24/7 unless bathing/showering or changing the dressing. You will wash and reuse the EdemaWear. DO NOT CUT THE EDEMAWEAR. IF IT IS TOO LONG THEN CUFF THE EDEMAWEAR (the EdemaWear can shrink length wise with washing).    A diet high in protein is important for wound healing, we recommend getting 90 grams of protein per day. Taking protein shakes or bars are a good way to get extra protein in your diet.     Good sources of protein:  Pork 26g per 3 oz  Whey protein powder - 24g per scoop (on average)  Greek yogurt - 23g per 8oz   Chicken or Turkey - 23g per 3oz  Fish - 20-25g per 3oz  Beef - 18-23g per 3oz  Navy beans - 20g per cup  Cottage cheese - 14g per 1/2 cup   Lentils - 13g per 1/4 cup  Beef jerky 13g per 1oz  2% milk - 8g per cup  Peanut butter - 8g per 2 tablespoons  Eggs - 6g per egg  Mixed nuts - 6g per 2oz     Woo Lew M.D. September 27, 2023    Call us at [phone] if you have any questions about your wounds, have redness or swelling around your wound, have a fever of 101 degrees Fahrenheit or greater or if you have any other problems or concerns. We answer the phone Monday through Friday 8 am to 4 pm, please leave a message as we check the voicemail frequently throughout the day.     If you had a positive experience please indicate that on your patient satisfaction survey form that [facility] will be sending you.    It was a pleasure meeting with you today.  Thank you for allowing me and my team the privilege of caring for you today.  YOU are the reason we are here, and I truly hope we  provided you with the excellent service you deserve.  Please let us know if there is anything else we can do for you so that we can be sure you are leaving completely satisfied with your care experience.      If you have any billing related questions please call the [facility] Business office at [phone]. The clinic staff does not handle billing related matters.    If you are scheduled to have a follow up appointment, you will receive a reminder call the day before your visit. On the appointment day please arrive 15 minutes prior to your appointment time. If you are unable to keep that appointment, please call the clinic to cancel or reschedule. If you are more than 10 minutes late or greater for your scheduled appointment time, the clinic policy is that you may be asked to reschedule.         ,

## 2023-09-27 NOTE — PROGRESS NOTES
Patient arrived for wound care visit. Certified Wound Care Nurse time spent evaluating patient record, completed a full evaluation and documented wound(s) & mary-wound skin; provided recommendation based on treatment plan. Applied dressing, reviewed discharge instructions, patient education, and discussed plan of care with appropriate medical team staff members and patient and/or family members.

## 2023-09-28 NOTE — TELEPHONE ENCOUNTER
Future Appointments   Date Time Provider Department Center   10/9/2023  1:00 PM [facility] CINDI STOVER

## 2023-10-06 ENCOUNTER — HOSPITAL ENCOUNTER (EMERGENCY)
Facility: CLINIC | Age: 83
Discharge: HOME | End: 2023-10-07
Attending: EMERGENCY MEDICINE | Admitting: EMERGENCY MEDICINE
Payer: MEDICARE

## 2023-10-06 DIAGNOSIS — E11.65 TYPE 2 DIABETES MELLITUS WITH HYPERGLYCEMIA, UNSPECIFIED WHETHER LONG TERM INSULIN USE (H): ICD-10-CM

## 2023-10-06 LAB
ALBUMIN SERPL BCG-MCNC: 4 G/DL (ref 3.5–5.2)
ALBUMIN UR-MCNC: NEGATIVE MG/DL
ALP SERPL-CCNC: 122 U/L (ref 35–104)
ALT SERPL W P-5'-P-CCNC: 53 U/L (ref 0–50)
ANION GAP SERPL CALCULATED.3IONS-SCNC: 10 MMOL/L (ref 7–15)
APPEARANCE UR: CLEAR
AST SERPL W P-5'-P-CCNC: 29 U/L (ref 0–45)
ATRIAL RATE - MUSE: 77 BPM
BASO+EOS+MONOS # BLD AUTO: ABNORMAL 10*3/UL
BASO+EOS+MONOS NFR BLD AUTO: ABNORMAL %
BASOPHILS # BLD AUTO: 0 10E3/UL (ref 0–0.2)
BASOPHILS NFR BLD AUTO: 0 %
BILIRUB SERPL-MCNC: 0.2 MG/DL
BILIRUB UR QL STRIP: NEGATIVE
BUN SERPL-MCNC: 23.6 MG/DL (ref 8–23)
CALCIUM SERPL-MCNC: 9.3 MG/DL (ref 8.8–10.2)
CHLORIDE SERPL-SCNC: 103 MMOL/L (ref 98–107)
COLOR UR AUTO: COLORLESS
CREAT SERPL-MCNC: 0.89 MG/DL (ref 0.51–0.95)
DEPRECATED HCO3 PLAS-SCNC: 27 MMOL/L (ref 22–29)
DIASTOLIC BLOOD PRESSURE - MUSE: 70 MMHG
EGFRCR SERPLBLD CKD-EPI 2021: 65 ML/MIN/1.73M2
EOSINOPHIL # BLD AUTO: 0 10E3/UL (ref 0–0.7)
EOSINOPHIL NFR BLD AUTO: 0 %
ERYTHROCYTE [DISTWIDTH] IN BLOOD BY AUTOMATED COUNT: 17.4 % (ref 10–15)
GLUCOSE BLDC GLUCOMTR-MCNC: 339 MG/DL (ref 70–99)
GLUCOSE SERPL-MCNC: 304 MG/DL (ref 70–99)
GLUCOSE UR STRIP-MCNC: >1000 MG/DL
HCT VFR BLD AUTO: 32.1 % (ref 35–47)
HGB BLD-MCNC: 8.7 G/DL (ref 11.7–15.7)
HGB UR QL STRIP: NEGATIVE
IMM GRANULOCYTES # BLD: 0.1 10E3/UL
IMM GRANULOCYTES NFR BLD: 1 %
INTERPRETATION ECG - MUSE: NORMAL
KETONES UR STRIP-MCNC: NEGATIVE MG/DL
LEUKOCYTE ESTERASE UR QL STRIP: NEGATIVE
LYMPHOCYTES # BLD AUTO: 1.9 10E3/UL (ref 0.8–5.3)
LYMPHOCYTES NFR BLD AUTO: 17 %
MCH RBC QN AUTO: 20.9 PG (ref 26.5–33)
MCHC RBC AUTO-ENTMCNC: 27.1 G/DL (ref 31.5–36.5)
MCV RBC AUTO: 77 FL (ref 78–100)
MONOCYTES # BLD AUTO: 0.8 10E3/UL (ref 0–1.3)
MONOCYTES NFR BLD AUTO: 7 %
MUCOUS THREADS #/AREA URNS LPF: PRESENT /LPF
NEUTROPHILS # BLD AUTO: 8.1 10E3/UL (ref 1.6–8.3)
NEUTROPHILS NFR BLD AUTO: 75 %
NITRATE UR QL: NEGATIVE
NRBC # BLD AUTO: 0 10E3/UL
NRBC BLD AUTO-RTO: 0 /100
P AXIS - MUSE: 62 DEGREES
PH UR STRIP: 5 [PH] (ref 5–7)
PLATELET # BLD AUTO: 310 10E3/UL (ref 150–450)
POTASSIUM SERPL-SCNC: 3.6 MMOL/L (ref 3.4–5.3)
PR INTERVAL - MUSE: 148 MS
PROT SERPL-MCNC: 6.6 G/DL (ref 6.4–8.3)
QRS DURATION - MUSE: 146 MS
QT - MUSE: 448 MS
QTC - MUSE: 506 MS
R AXIS - MUSE: 45 DEGREES
RBC # BLD AUTO: 4.16 10E6/UL (ref 3.8–5.2)
RBC URINE: 1 /HPF
SODIUM SERPL-SCNC: 140 MMOL/L (ref 135–145)
SP GR UR STRIP: 1.03 (ref 1–1.03)
SQUAMOUS EPITHELIAL: <1 /HPF
SYSTOLIC BLOOD PRESSURE - MUSE: 161 MMHG
T AXIS - MUSE: 50 DEGREES
UROBILINOGEN UR STRIP-MCNC: <2 MG/DL
VENTRICULAR RATE- MUSE: 77 BPM
WBC # BLD AUTO: 10.9 10E3/UL (ref 4–11)
WBC URINE: <1 /HPF

## 2023-10-06 PROCEDURE — 85025 COMPLETE CBC W/AUTO DIFF WBC: CPT | Performed by: EMERGENCY MEDICINE

## 2023-10-06 PROCEDURE — 80053 COMPREHEN METABOLIC PANEL: CPT | Performed by: EMERGENCY MEDICINE

## 2023-10-06 PROCEDURE — 96360 HYDRATION IV INFUSION INIT: CPT

## 2023-10-06 PROCEDURE — 258N000003 HC RX IP 258 OP 636: Performed by: EMERGENCY MEDICINE

## 2023-10-06 PROCEDURE — 99284 EMERGENCY DEPT VISIT MOD MDM: CPT | Mod: 25

## 2023-10-06 PROCEDURE — 93005 ELECTROCARDIOGRAM TRACING: CPT | Performed by: EMERGENCY MEDICINE

## 2023-10-06 PROCEDURE — 36415 COLL VENOUS BLD VENIPUNCTURE: CPT | Performed by: EMERGENCY MEDICINE

## 2023-10-06 PROCEDURE — 96361 HYDRATE IV INFUSION ADD-ON: CPT

## 2023-10-06 PROCEDURE — 81001 URINALYSIS AUTO W/SCOPE: CPT | Performed by: EMERGENCY MEDICINE

## 2023-10-06 PROCEDURE — 82962 GLUCOSE BLOOD TEST: CPT

## 2023-10-06 PROCEDURE — 250N000013 HC RX MED GY IP 250 OP 250 PS 637: Performed by: EMERGENCY MEDICINE

## 2023-10-06 RX ORDER — ONDANSETRON 2 MG/ML
4 INJECTION INTRAMUSCULAR; INTRAVENOUS EVERY 30 MIN PRN
Status: DISCONTINUED | OUTPATIENT
Start: 2023-10-06 | End: 2023-10-07 | Stop reason: HOSPADM

## 2023-10-06 RX ORDER — MECLIZINE HYDROCHLORIDE 25 MG/1
25 TABLET ORAL ONCE
Status: COMPLETED | OUTPATIENT
Start: 2023-10-06 | End: 2023-10-06

## 2023-10-06 RX ADMIN — MECLIZINE HYDROCHLORIDE 25 MG: 25 TABLET ORAL at 21:42

## 2023-10-06 RX ADMIN — SODIUM CHLORIDE 1000 ML: 9 INJECTION, SOLUTION INTRAVENOUS at 20:45

## 2023-10-06 ASSESSMENT — ACTIVITIES OF DAILY LIVING (ADL)
ADLS_ACUITY_SCORE: 35
ADLS_ACUITY_SCORE: 35

## 2023-10-07 VITALS
TEMPERATURE: 98.4 F | HEIGHT: 65 IN | BODY MASS INDEX: 31.65 KG/M2 | OXYGEN SATURATION: 93 % | SYSTOLIC BLOOD PRESSURE: 147 MMHG | DIASTOLIC BLOOD PRESSURE: 66 MMHG | HEART RATE: 65 BPM | RESPIRATION RATE: 18 BRPM | WEIGHT: 190 LBS

## 2023-10-07 LAB
GLUCOSE BLDC GLUCOMTR-MCNC: 218 MG/DL (ref 70–99)
GLUCOSE BLDC GLUCOMTR-MCNC: 260 MG/DL (ref 70–99)
GLUCOSE BLDC GLUCOMTR-MCNC: 293 MG/DL (ref 70–99)

## 2023-10-07 PROCEDURE — 82962 GLUCOSE BLOOD TEST: CPT

## 2023-10-07 PROCEDURE — 250N000012 HC RX MED GY IP 250 OP 636 PS 637: Performed by: EMERGENCY MEDICINE

## 2023-10-07 RX ADMIN — INSULIN ASPART 4 UNITS: 100 INJECTION, SOLUTION INTRAVENOUS; SUBCUTANEOUS at 00:00

## 2023-10-07 ASSESSMENT — ACTIVITIES OF DAILY LIVING (ADL)
ADLS_ACUITY_SCORE: 35
ADLS_ACUITY_SCORE: 35

## 2023-10-07 NOTE — ED TRIAGE NOTES
"Pt presents via EMS with complaints of high blood sugar. EMS had reading of 427. Pt lives in public housing and EMS states staff were were at her house three days ago and \"changed her glipizide and it has been increasing since\". Pt endorses feeling dizzy and weak as well. Pt is [place], and deaf in the R ear.     Blood sugar= 339 during triage.     "

## 2023-10-07 NOTE — ED PROVIDER NOTES
EMERGENCY DEPARTMENT ENCOUNTER      NAME: Jumana Yang  AGE: 83 year old female  YOB: 1940  MRN: [id number]  EVALUATION DATE & TIME: 10/6/2023  8:44 PM    PCP: Ananya Mclean    ED PROVIDER: Dany Mckeon M.D.      Chief Complaint   Patient presents with    Blood Sugar Problem         FINAL IMPRESSION:  1. Type 2 diabetes mellitus with hyperglycemia, unspecified whether long term insulin use (H)          ED COURSE & MEDICAL DECISION MAKING:    Pertinent Labs & Imaging studies reviewed. (See chart for details)  83 year old female presents to the Emergency Department for evaluation of elevated blood glucose.  Has some dizziness with this.  I think is all due to her hyperglycemia.  Did give fluids and small on insulin.  Glucose coming down to the 200s.  No signs of DKA.  No signs of infection.  Urinalysis is normal other than elevated glucose.  No other focal signs of infection.  They did recently increase her glipizide but that was yesterday.  I doubt he would have an effect as of yet.  I do think she safe for discharge home.  We will have her follow-up with her primary on Monday and did put a referral for this to better treat her diabetes.  Is been going on for the last couple of weeks at least.  I do not think she needs an acute admission for this.  Patient also has multiple chronic pain complaints.  Has a history of chronic pain.  It was discussed with her that she will not get pain medicine from the and can discuss this with her primary.    9:10 PM I met with the patient to gather history and to perform my initial exam. I discussed the plan for care while in the Emergency Department.   10:23 PM I went to recheck on the patient and update on results.   2:00 AM We discussed the plan for discharge and the patient is agreeable. Reviewed supportive cares, symptomatic treatment, outpatient follow up, and reasons to return to the Emergency Department. Patient to be discharged by ED RN.       At the conclusion of the encounter I discussed the results of all of the tests and the disposition. The questions were answered. The patient or family acknowledged understanding and was agreeable with the care plan.     Medical Decision Making    History:  Supplemental history from: Documented in chart, if applicable  External Record(s) reviewed: Documented in chart, if applicable.    Work Up:  Chart documentation includes differential considered and any EKGs or imaging independently interpreted by provider, where specified.  In additional to work up documented, I considered the following work up: Documented in chart, if applicable.    External consultation:  Discussion of management with another provider: Documented in chart, if applicable    Complicating factors:  Care impacted by chronic illness: Chronic Lung Disease, Chronic Pain, Diabetes, Hyperlipidemia, and Hypertension  Care affected by social determinants of health: N/A    Disposition considerations: Discharge. No recommendations on prescription strength medication(s). See documentation for any additional details.        MEDICATIONS GIVEN IN THE EMERGENCY:  Medications   ondansetron (ZOFRAN) injection 4 mg (4 mg Intravenous Not Given 10/6/23 2149)   sodium chloride 0.9% BOLUS 1,000 mL (0 mLs Intravenous Stopped 10/6/23 2224)   meclizine (ANTIVERT) tablet 25 mg (25 mg Oral $Given 10/6/23 2142)   insulin aspart (NovoLOG) injection (RAPID ACTING) (4 Units Subcutaneous $Given 10/7/23 0000)       NEW PRESCRIPTIONS STARTED AT TODAY'S ER VISIT  New Prescriptions    No medications on file          =================================================================    HPI    Patient information was obtained from: Patient    Use of : N/A      Jumana Yang is a 83 year old female with a pertinent history of chronic pain, COPD, diabetes, hypertension, and hyperlipidemia, who presents to this ED, via EMS, for evaluation of hyperglycemia.    The patient  report here with elevated blood sugar this evening.  She reports struggling with high blood sugar the last few weeks, but it was worse tonight as she also was dizzy with the high blood sugar.  She is unsure if it is a room-spinning dizziness, but she states it is worse when she is moving around.  She is on Glipizide for her diabetes but no insulin.  She reports when she took her blood sugar this evening after the dizziness onset that it was greater than 600.  The patient also notes that her blood pressure was up this evening after her dizziness onset.  She is reporting nausea but no vomiting. She reports recently struggling with a right finger sprain with associated pain.  She also reports chronic leg swelling due to lymphedema and leg pain due to neuropathy.  She reports normally being in a wheelchair at baseline.  She did receive her flu shot yesterday.  She denies any other complaints at this time.         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    Hepatic abscess     Hyperlipidemia     Hypertension     Hypothyroidism     Irritable bowel syndrome     Meniere disease     Neuropathy     Noninfectious ileitis     Peritoneal abscess (H)     Spinal stenosis of lumbar region     Steroid long-term use     Vaginitis, atrophic, postmenopausal     Vitamin D deficiency        PAST SURGICAL HISTORY:  Past Surgical History:   Procedure Laterality Date    CATARACT IOL, RT/LT Left 7/2013    FOOT SURGERY      toe    GI SURGERY      IR LUMBAR/SACRAL TRANSFOR INJ BILATERAL Bilateral 3/11/2022    LAPAROSCOPIC HEPATECTOMY PARTIAL  11/4/2013     Procedure: LAPAROSCOPIC HEPATECTOMY PARTIAL;  Laparoscopic Debridement of Liver Abcess;  Surgeon: Sepideh Green MD;  Location: [facility] OR    ORTHOPEDIC SURGERY      PICC INSERTION  8/28/2013    5fr DL Power PICC, 41cm (1cm external length) in the R lateral brachial vein with tip in the SVC RA junction.    RECTAL SURGERY      1970s    RELEASE CARPAL TUNNEL Left 3/21/2023    Procedure: LEFT OPEN CARPAL TUNNEL RELEASE. LEFT RING FINGER A1 PULLEY RELEASE;  Surgeon: Claire Hamilton MD;  Location: [facility] OR    VASCULAR SURGERY             CURRENT MEDICATIONS:    Current Facility-Administered Medications   Medication    ondansetron (ZOFRAN) injection 4 mg     Current Outpatient Medications   Medication    acetaminophen (TYLENOL) 325 MG tablet    blood glucose (ACCU-CHEK FASTCLIX) lancing device    blood glucose (CONTOUR TEST) test strip    busPIRone (BUSPAR) 7.5 MG tablet    calamine 8-8 % lotion    clobetasol (TEMOVATE) 0.05 % external cream    CONTOUR NEXT TEST test strip    cyanocobalamin 1000 MCG SUBL    diazepam (VALIUM) 2 MG tablet    DULoxetine (CYMBALTA) 60 MG capsule    glipiZIDE (GLUCOTROL) 5 MG tablet    hydrOXYzine (ATARAX) 25 MG tablet    JARDIANCE 25 MG TABS tablet    lactulose (CHRONULAC) 10 GM/15ML solution    lamoTRIgine (LAMICTAL) 100 MG tablet    lisinopril (ZESTRIL) 5 MG tablet    melatonin 3 MG tablet    metFORMIN (GLUCOPHAGE) 500 MG tablet    methocarbamol (ROBAXIN) 500 MG tablet    miconazole (MICATIN) 2 % external powder    naloxone (NARCAN) 4 MG/0.1ML nasal spray    nystatin (MYCOSTATIN) 385223 UNIT/ML suspension    omeprazole (PRILOSEC) 20 MG DR capsule    ondansetron (ZOFRAN ODT) 4 MG ODT tab    order for DME    oxybutynin ER (DITROPAN XL) 15 MG 24 hr tablet    oxyCODONE (ROXICODONE) 5 MG tablet    polyethylene glycol (MIRALAX) 17 GM/Dose powder    predniSONE (DELTASONE) 1 MG tablet    predniSONE (DELTASONE) 5 MG tablet    pregabalin (LYRICA) 50 MG capsule    senna-docusate  (SENOKOT-S/PERICOLACE) 8.6-50 MG tablet    triamcinolone (KENALOG) 0.1 % external cream    triamterene-HCTZ (DYAZIDE) 37.5-25 MG capsule    vitamin D3 (CHOLECALCIFEROL) 50 mcg (2000 units) tablet         ALLERGIES:  Allergies   Allergen Reactions    Propofol Nausea and Vomiting    Shellfish Allergy      Other reaction(s): Dizziness    Capsaicin Rash and Blisters       FAMILY HISTORY:  Family History   Problem Relation Age of Onset    Cerebrovascular Disease Mother     Heart Disease Mother     Heart Disease Father     Heart Disease Brother     Diabetes No family hx of     Coronary Artery Disease No family hx of     Hypertension No family hx of     Hyperlipidemia No family hx of     Breast Cancer No family hx of     Colon Cancer No family hx of     Prostate Cancer No family hx of     Other Cancer No family hx of     Depression No family hx of     Anxiety Disorder No family hx of     Mental Illness No family hx of     Substance Abuse No family hx of     Anesthesia Reaction No family hx of     Asthma No family hx of     Osteoporosis No family hx of     Genetic Disorder No family hx of     Thyroid Disease No family hx of     Obesity No family hx of     Unknown/Adopted No family hx of        SOCIAL HISTORY:   Social History     Socioeconomic History    Marital status:    Tobacco Use    Smoking status: Former     Types: Cigarettes     Quit date: 11/15/1987     Years since quittin.9    Smokeless tobacco: Former   Substance and Sexual Activity    Alcohol use: Not Currently     Alcohol/week: 0.0 standard drinks of alcohol     Comment: MALISSA white since     Drug use: No     Comment: used marijuanna in the past    Sexual activity: Never     Partners: Male   Other Topics Concern    Parent/sibling w/ CABG, MI or angioplasty before 65F 55M? No   Social History Narrative    ** Merged History Encounter **            VITALS:  BP (!) 147/66   Pulse 65   Temp 98.4  F (36.9  C) (Oral)   Resp 18   Ht 1.651 m (5'  "5\")   Wt 86.2 kg (190 lb)   LMP  (LMP Unknown)   SpO2 93%   BMI 31.62 kg/m      PHYSICAL EXAM    Physical Exam  Vitals and nursing note reviewed.   Constitutional:       General: She is not in acute distress.     Appearance: She is not diaphoretic.   HENT:      Head: Atraumatic.      Mouth/Throat:      Pharynx: No oropharyngeal exudate.   Eyes:      General: No scleral icterus.     Pupils: Pupils are equal, round, and reactive to light.   Cardiovascular:      Rate and Rhythm: Normal rate and regular rhythm.      Heart sounds: Normal heart sounds.   Pulmonary:      Effort: No respiratory distress.      Breath sounds: Normal breath sounds.   Abdominal:      Palpations: Abdomen is soft.      Tenderness: There is no abdominal tenderness. There is no guarding or rebound. Negative signs include Rodriguez's sign.   Musculoskeletal:         General: No tenderness.   Skin:     General: Skin is warm.      Findings: No rash.   Neurological:      General: No focal deficit present.      Mental Status: She is alert.      Comments: 5 out of 5 strength in bilateral upper and lower extremities.  Sensation intact in all 4 extremes.  Cranial nerves intact.  No pronator drift               LAB:  All pertinent labs reviewed and interpreted.  Labs Ordered and Resulted from Time of ED Arrival to Time of ED Departure   COMPREHENSIVE METABOLIC PANEL - Abnormal       Result Value    Sodium 140      Potassium 3.6      Carbon Dioxide (CO2) 27      Anion Gap 10      Urea Nitrogen 23.6 (*)     Creatinine 0.89      GFR Estimate 65      Calcium 9.3      Chloride 103      Glucose 304 (*)     Alkaline Phosphatase 122 (*)     AST 29      ALT 53 (*)     Protein Total 6.6      Albumin 4.0      Bilirubin Total 0.2     ROUTINE UA WITH MICROSCOPIC REFLEX TO CULTURE - Abnormal    Color Urine Colorless      Appearance Urine Clear      Glucose Urine >1000 (*)     Bilirubin Urine Negative      Ketones Urine Negative      Specific Gravity Urine 1.028      " Blood Urine Negative      pH Urine 5.0      Protein Albumin Urine Negative      Urobilinogen Urine <2.0      Nitrite Urine Negative      Leukocyte Esterase Urine Negative      Mucus Urine Present (*)     RBC Urine 1      WBC Urine <1      Squamous Epithelials Urine <1     CBC WITH PLATELETS AND DIFFERENTIAL - Abnormal    WBC Count 10.9      RBC Count 4.16      Hemoglobin 8.7 (*)     Hematocrit 32.1 (*)     MCV 77 (*)     MCH 20.9 (*)     MCHC 27.1 (*)     RDW 17.4 (*)     Platelet Count 310      % Neutrophils 75      % Lymphocytes 17      % Monocytes 7      Mids % (Monos, Eos, Basos)        % Eosinophils 0      % Basophils 0      % Immature Granulocytes 1      NRBCs per 100 WBC 0      Absolute Neutrophils 8.1      Absolute Lymphocytes 1.9      Absolute Monocytes 0.8      Mids Abs (Monos, Eos, Basos)        Absolute Eosinophils 0.0      Absolute Basophils 0.0      Absolute Immature Granulocytes 0.1      Absolute NRBCs 0.0     GLUCOSE BY METER - Abnormal    GLUCOSE BY METER POCT 339 (*)    GLUCOSE BY METER - Abnormal    GLUCOSE BY METER POCT 293 (*)    GLUCOSE BY METER - Abnormal    GLUCOSE BY METER POCT 260 (*)    GLUCOSE BY METER - Abnormal    GLUCOSE BY METER POCT 218 (*)    GLUCOSE MONITOR NURSING POCT       RADIOLOGY:  Reviewed all pertinent imaging. Please see official radiology report.  No orders to display       EKG:    Performed at: 2050  Impression: Sinus rhythm right bundle narinder block.  Unchanged from previous dated May 26, 2022.  Sinus rhythm with a rate of 77.  .  .  QTc 506.    I have independently reviewed and interpreted the EKG(s) documented above.    PROCEDURES:   None      I, Kristian Kunz, am serving as a scribe to document services personally performed by Dr. Dany Mckeon, based on my observation and the provider's statements to me. I, Dany Mckeon MD attest that Kristian Kunz is acting in a scribe capacity, has observed my performance of the services and has documented  them in accordance with my direction.    Dany Mckeon M.D.  Emergency Medicine  [facility] EMERGENCY ROOM  [address] [id number] [phone]  Dept: [phone]      Dany Mckeon MD  10/07/23 0226

## 2023-10-07 NOTE — ED NOTES
"Pt declines vitals stating \"if I am leaving anyway just take it off, it's not even worth it\". Complaining of 10/10 pain everywhere. Declining ice/heat anywhere. Writer asked pt where pain is the worst and pt responded \"I don't even know, it all hurts all over\". Refusing acetaminophen/ibuprofen. Provider informed.     PCA/friend Davie at bedside. Call light within reach.     Waiting for pt son Davie to return and will go over discharge paperwork.   "

## 2023-10-07 NOTE — ED NOTES
Discharge instructions discussed with pt, pt son Davie, and pt PCA/friend Hernán. All questions answered and pt agreeable with discharge plan of care. VSS. Pt ambulatory at discharge, able to ambulate into the car without difficulty.

## 2023-10-09 ENCOUNTER — HOSPITAL ENCOUNTER (OUTPATIENT)
Dept: ULTRASOUND IMAGING | Facility: CLINIC | Age: 83
Discharge: HOME | End: 2023-10-09
Attending: FAMILY MEDICINE | Admitting: FAMILY MEDICINE
Payer: MEDICARE

## 2023-10-09 DIAGNOSIS — L97.912 ULCER OF RIGHT LOWER EXTREMITY WITH FAT LAYER EXPOSED (H): ICD-10-CM

## 2023-10-09 DIAGNOSIS — L97.422 ULCER OF LEFT HEEL, WITH FAT LAYER EXPOSED (H): ICD-10-CM

## 2023-10-09 PROCEDURE — 93922 UPR/L XTREMITY ART 2 LEVELS: CPT

## 2023-10-17 ENCOUNTER — HOSPITAL ENCOUNTER (OUTPATIENT)
Dept: WOUND CARE | Facility: CLINIC | Age: 83
Discharge: HOME | End: 2023-10-17
Attending: FAMILY MEDICINE | Admitting: FAMILY MEDICINE
Payer: MEDICARE

## 2023-10-17 DIAGNOSIS — S81.801D WOUND OF RIGHT LOWER EXTREMITY, SUBSEQUENT ENCOUNTER: ICD-10-CM

## 2023-10-17 DIAGNOSIS — L89.623 STAGE III PRESSURE ULCER OF LEFT HEEL (H): Primary | ICD-10-CM

## 2023-10-17 PROCEDURE — 99214 OFFICE O/P EST MOD 30 MIN: CPT | Performed by: FAMILY MEDICINE

## 2023-10-17 PROCEDURE — 97602 WOUND(S) CARE NON-SELECTIVE: CPT

## 2023-10-17 NOTE — ADDENDUM NOTE
Encounter addended by: Sandhya Arias RN on: 10/17/2023 12:39 PM   Actions taken: Edited Discharge Instructions

## 2023-10-17 NOTE — PROGRESS NOTES
Wound Clinic Note         Visit date: 10/17/2023       Che Complaint:     Jumana Yang is a 83 year old   female had concerns including WOUND CARE.  The patient has lower extremity edema, a left heel wound and a right leg wound.         HISTORY OF PRESENT ILLNESS:    Jumana Yang reports the wound has been present since early August.  The wound began without a clear cause.   Prior to this she has never had other difficult to heal wounds on the lower extremities.    She reports since her last clinic visit with me there is been no drainage from the right leg wound and believes this area has been healed.  She is continue to wear her edema wear stocking and is working with the lymphedema clinic to get a new pair of Velcro compression garments.  The left heel wound has been bandaged with a Mepilex bandage and her edema wear stocking change 3 times a week by the home health nurses.        The pateint denies fevers or chills.  They report the pain from the wound has been 8/10 and has remained about the same recently.      The patient reports they currently do not have any routine for elevating their legs.  Despite our previous conversation she has not been laying down to elevate her legs above the level of her heart during the day recently.  She does sleep flat laying on a couch.    Today the patient reports maintaining a high protein diet, but has not been taking protein supplements lately.        The patient denies a history of smoking or chronic steroid use.  and The patient confirms having diabetes and reports the blood sugars are above 200 at least once a day.  and The patient is working with a provider who manages the diabetes to better control the blood sugars.         The patient has not had any symptoms of infection relating to the wound recently and is not currently on antibiotics.       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       Surgical Hx:   Past Surgical History:   Procedure Laterality Date    CATARACT IOL, RT/LT Left 7/2013    FOOT SURGERY      toe    GI SURGERY      IR LUMBAR/SACRAL TRANSFOR INJ BILATERAL Bilateral 3/11/2022    LAPAROSCOPIC HEPATECTOMY PARTIAL  11/4/2013    Procedure: LAPAROSCOPIC HEPATECTOMY PARTIAL;  Laparoscopic Debridement of Liver Abcess;  Surgeon: Sepideh Green MD;  Location: [facility] OR    ORTHOPEDIC SURGERY      PICC INSERTION  8/28/2013    5fr DL Power PICC, 41cm (1cm external length) in the R lateral brachial vein with tip in the SVC RA junction.    RECTAL SURGERY      1970s    RELEASE CARPAL TUNNEL Left 3/21/2023    Procedure: LEFT OPEN CARPAL TUNNEL RELEASE. LEFT RING FINGER A1 PULLEY RELEASE;  Surgeon: Claire Hamilton MD;  Location: [facility] OR    VASCULAR SURGERY            Allergies:    Allergies   Allergen Reactions    Propofol Nausea and Vomiting    Shellfish Allergy      Other reaction(s): Dizziness    Capsaicin Rash and Blisters              Medication History:    Current Outpatient Medications   Medication Sig    acetaminophen (TYLENOL) 325 MG tablet Take 2 tablets (650 mg) by mouth every 4 hours as needed for other (For optimal  non-opioid multimodal pain management to improve pain control.) (Patient not taking: Reported on 7/17/2023)    blood glucose (ACCU-CHEK FASTCLIX) lancing device FOR TESTING ONCE DAILY. DX  E11.9 TYPE 2 DIABETES    blood glucose (CONTOUR TEST) test strip TESTING EVERY DAY DX  E11.9    busPIRone (BUSPAR) 7.5 MG tablet Take 7.5 mg by mouth 3 times daily    calamine 8-8 % lotion Apply topically every hour as needed for itching (Patient not taking: Reported on 7/17/2023)    clobetasol (TEMOVATE) 0.05 % external cream Apply topically 2 times daily    CONTOUR NEXT TEST test strip TESTING EVERY DAY DX  E11.9    cyanocobalamin 1000 MCG SUBL Place 1,000 mcg under the tongue daily    diazepam (VALIUM) 2 MG tablet Take 1 tablet (2 mg) by mouth every 6 hours as needed for anxiety    DULoxetine (CYMBALTA) 60 MG capsule Take 60 mg by mouth every morning    glipiZIDE (GLUCOTROL) 5 MG tablet Take 1 tablet (5 mg) by mouth 2 times daily (before meals)    hydrOXYzine (ATARAX) 25 MG tablet Take 25 mg by mouth 3 times daily as needed for anxiety    JARDIANCE 25 MG TABS tablet Take 25 mg by mouth every morning    lactulose (CHRONULAC) 10 GM/15ML solution Take 15 mLs (10 g) by mouth every 8 hours as needed for constipation (Patient not taking: Reported on 7/17/2023)    lamoTRIgine (LAMICTAL) 100 MG tablet Take 100 mg by mouth 2 times daily    lisinopril (ZESTRIL) 5 MG tablet Take 5 mg by mouth daily    melatonin 3 MG tablet Take 1 tablet (3 mg) by mouth nightly as needed for sleep (Patient not taking: Reported on 7/17/2023)    metFORMIN (GLUCOPHAGE) 500 MG tablet Take 500 mg by mouth 2 times daily (with meals)    methocarbamol (ROBAXIN) 500 MG tablet Take 1 tablet (500 mg) by mouth 3 times daily as needed for muscle spasms    miconazole (MICATIN) 2 % external powder Apply topically 2 times daily as needed To the skin fold    naloxone (NARCAN) 4 MG/0.1ML nasal spray Spray 1 spray (4 mg) into one nostril alternating nostrils as needed for  opioid reversal every 2-3 minutes until assistance arrives    nystatin (MYCOSTATIN) 569010 UNIT/ML suspension Take 5 mLs (500,000 Units) by mouth 4 times daily x7 days through 4/23    omeprazole (PRILOSEC) 20 MG DR capsule Take 20 mg by mouth daily    ondansetron (ZOFRAN ODT) 4 MG ODT tab Take 1 tablet (4 mg) by mouth every 6 hours as needed for nausea or vomiting    order for DME Equipment being ordered: wrist brace    oxybutynin ER (DITROPAN XL) 15 MG 24 hr tablet Take 15 mg by mouth every morning    oxyCODONE (ROXICODONE) 5 MG tablet Take 1-2 tablets (5-10 mg) by mouth every 6 hours as needed for moderate pain 1 if pain rated 1-5 & 2 if 6-10.    polyethylene glycol (MIRALAX) 17 GM/Dose powder Take 17 g by mouth 2 times daily as needed for constipation    predniSONE (DELTASONE) 1 MG tablet Take 1 mg by mouth every morning (In addition to the 5 mg dose; 1 mg + 5 mg = 6 mg)    predniSONE (DELTASONE) 5 MG tablet Take 5 mg by mouth every morning (In addition to the 1 mg dose; 5 mg + 1 mg = 6 mg)    pregabalin (LYRICA) 50 MG capsule Take 1 capsule (50 mg) by mouth 2 times daily    senna-docusate (SENOKOT-S/PERICOLACE) 8.6-50 MG tablet Take 1 tablet by mouth 2 times daily as needed for constipation    triamcinolone (KENALOG) 0.1 % external cream Apply topically 2 times daily as needed    triamterene-HCTZ (DYAZIDE) 37.5-25 MG capsule Take 1 capsule by mouth every morning Hold for SBP<110    vitamin D3 (CHOLECALCIFEROL) 50 mcg (2000 units) tablet Take 50 mcg by mouth daily     No current facility-administered medications for this encounter.         Tobacco History:  reports that she quit smoking about 35 years ago. Her smoking use included cigarettes. She has quit using smokeless tobacco.       REVIEW OF SYMPTOMS:   The review of systems was negative except as noted in the HPI.           PHYSICAL EXAMINATION:     LMP  (LMP Unknown)            GENERAL: The patient overall appears well and is no acute distress.   HEAD:  normocephalic   EYES: Sclera and conjunctiva clear   NECK: no obvious masses   LUNGS: breathing is unlabored.   EXTREMITIES: No clubbing, cyanosis or edema   SKIN: No rashes or other abnormalities except as noted under the Wound section below.   NEUROLOGICAL: normal motor and sensory function   EDEMA: Moderate  and Lymphedema       WOUND: The wound appears healthy with no sign of infection.   Wound bed: granulation tissue  Periwound: healthy intact skin  The right leg wound is healed.  The left heel wound is much smaller compared with her last clinic visit.      Also see below for wound details:       Circumferential volume measures:             No data to display                Ulceration(s)/Wound(s):   Please see the media tab under the chart review for pictures of the wounds.  Nursing staff removed dressings and cleansed wound.    Wound (used by OP [facility] only) 09/27/23 1326 Left heel pressure injury (Active)   Thickness/Stage Stage 3 10/17/23 1202   Base pink;yellow 10/17/23 1202   Periwound macerated 10/17/23 1202   Periwound Temperature warm 10/17/23 1202   Periwound Skin Turgor soft 10/17/23 1202   Edges open 10/17/23 1202   Length (cm) 1 10/17/23 1202   Width (cm) 1 10/17/23 1202   Depth (cm) 0.2 10/17/23 1202   Wound (cm^2) 1 cm^2 10/17/23 1202   Wound Volume (cm^3) 0.2 cm^3 10/17/23 1202   Wound healing % 64.91 10/17/23 1202   Drainage Characteristics/Odor serosanguineous 10/17/23 1202   Drainage Amount moderate 10/17/23 1202   Care, Wound non-select wound debridement performed. 10/17/23 1202               Recent Labs   Lab Test 03/21/23  1025 03/08/22  0626 07/20/21  0614   A1C 6.9* 8.8* 10.8*          Recent Labs   Lab Test 05/22/23  1316 07/03/22  2204 06/16/22  2302   ALBUMIN 3.5 3.8 3.6              No sharp debridement performed today.                  ASSESSMENT:   This is a 83 year old  female with a stage III left heel pressure injury and a right leg wound, the patient also has lower extremity edema  which was also managed during today's clinic visit.          PLAN:   We will bandage the left heel wound with a Mepilex bandage change 3 times a week and then she will continue to use her edema wear stockings every day to help control her swelling.  Once she has the Velcro compression garments she should begin using those for control of her swelling.  I have encouraged her to continue to keep pressure off of the heel area as much as she can during the day and the night.  On October 9, 2023 she had an ankle-brachial index checked which did not show any evidence of arterial insufficiency.      Separate from the wound care instructions we then discussed management strategies for lower extremity edema.  I explained the keys for managing lower extremity edema are compression and elevation.  I have again explained to the patient today that controlling the edema is probably the most important thing we can do to help heal the wound.  I have specifically recommended that they lay down with their legs above the level of the heart for 30 minutes at least twice a day.  I emphasized that if we can not control the edema we will likely not be able to get this wound to heal.     I have encouraged the patient to continue on their high protein diet to aid in wound healing.   The patient will return to the wound clinic in 2 to 3 weeks to see me again.        30 minutes spent on the date of the encounter doing chart review, history and exam, documentation and further activities per the note, this time excludes any procedure time      Woo Lew MD  10/17/2023   12:33 PM   [facility]/Wound  [phone]    This note was electronically signed by Woo Lew MD        Further instructions from your care team         Jumana Yang      1940    A DME order for supplies has been placed to [facility], [address]. If there are any issues with your order including not receiving the order  please call [facility] at [phone] option 1. They can also provide a tracking number for you if you had supplies shipped to you.     Dressing changes outside of clinic are being performed by Patient and PCA     Plan:  -Try to minimize placing pressure to heel - especially when you are moving around with the wheelchair   -Do not shower with dressings on; take them off before shower. Place compression back on right after showering!     Wound Dressing Change: left heel  - Wash your hands with soap and water before you begin your dressing change and prepare a clean surface for dressings.  -Cleanse with mild unscented soap and water (such as Cetaphil, Cerave or Dove)   -Apply one 4x4 mepilex to heel   -Pull up navy stripe edemawear from toes to behind the knee  -Apply Velcro compression wraps   Change three times per week      Elevation:  It is recommended that you elevate your legs above the level of your heart for 30 minutes: approximately 2-3 times each day   Ways to do this:   - Lay on the couch or your bed and prop your legs up on pillows. Pillows should be under your calves in bed so your feet are floating in the air and no pressure is on the wound.   - Recline back as far as you can go in your recliner and prop your legs on pillows.   Doing these things will help reduce the edema in your legs.     EdemaWear  Then apply Navy Stripe EdemaWear from toes to knee. EdemaWear should be worn 24/7 unless bathing/showering or changing the dressing. You will wash and reuse the EdemaWear. DO NOT CUT THE EDEMAWEAR. IF IT IS TOO LONG THEN CUFF THE EDEMAWEAR (the EdemaWear can shrink length wise with washing).    Compression:   Your compression is  Velcro Compression Wraps  and can be removed at night and put back on first thing in the morning.   Please remove compression dressing if toes turn blue and/or tingle and can not be relieved by raising the leg for one hour. If unable to reapply in the morning, keep  compression on until next dressing change.  Walk as much as you can, as you are able. Whenever you sit raise your ankle above your hips to promote wound healing.      Protein:  A diet high in protein is important for wound healing, we recommend getting 90 grams of protein per day. Taking protein shakes or bars are a good way to get extra protein in your diet.   Good sources of protein:  Pork 26g per 3 oz  Whey protein powder - 24g per scoop (on average)  Greek yogurt - 23g per 8oz   Chicken or Turkey - 23g per 3oz  Fish - 20-25g per 3oz  Beef - 18-23g per 3oz  Navy beans - 20g per cup  Cottage cheese - 14g per 1/2 cup   Lentils - 13g per 1/4 cup  Beef jerky 13g per 1oz  2% milk - 8g per cup  Peanut butter - 8g per 2 tablespoons  Eggs - 6g per egg  Mixed nuts - 6g per 2oz    Blood Sugars:  Keep blood sugar below 150 or A1C below 7.0     Woo Lew M.D. October 17, 2023    Call us at [phone] if you have any questions about your wounds, have redness or swelling around your wound, have a fever of 101 degrees Fahrenheit or greater or if you have any other problems or concerns. We answer the phone Monday through Friday 8 am to 4 pm, please leave a message as we check the voicemail frequently throughout the day.     If you had a positive experience please indicate that on your patient satisfaction survey form that [facility] will be sending you.    It was a pleasure meeting with you today.  Thank you for allowing me and my team the privilege of caring for you today.  YOU are the reason we are here, and I truly hope we provided you with the excellent service you deserve.  Please let us know if there is anything else we can do for you so that we can be sure you are leaving completely satisfied with your care experience.      If you have any billing related questions please call the [facility] Business office at [phone]. The clinic staff does not handle billing related matters.    If you are scheduled  to have a follow up appointment, you will receive a reminder call the day before your visit. On the appointment day please arrive 15 minutes prior to your appointment time. If you are unable to keep that appointment, please call the clinic to cancel or reschedule. If you are more than 10 minutes late or greater for your scheduled appointment time, the clinic policy is that you may be asked to reschedule.        ,

## 2023-10-17 NOTE — DISCHARGE INSTRUCTIONS
Jumana Yang      1940    A DME order for supplies has been placed to [facility], [address]. If there are any issues with your order including not receiving the order please call [facility] at [phone] option 1. They can also provide a tracking number for you if you had supplies shipped to you.    Please ship supplies to [facility] at [id number] [facility] [id number]     Dressing changes outside of clinic are being performed by Patient and PCA     Plan:  -Try to minimize placing pressure to heel - especially when you are moving around with the wheelchair   -Do not shower with dressings on; take them off before shower. Place compression back on right after showering!     Wound Dressing Change: left heel  - Wash your hands with soap and water before you begin your dressing change and prepare a clean surface for dressings.  -Cleanse with mild unscented soap and water (such as Cetaphil, Cerave or Dove)   -Apply one 4x4 mepilex to heel   -Pull up navy stripe edemawear from toes to behind the knee  -Apply Velcro compression wraps   Change three times per week      Elevation:  It is recommended that you elevate your legs above the level of your heart for 30 minutes: approximately 2-3 times each day   Ways to do this:   - Lay on the couch or your bed and prop your legs up on pillows. Pillows should be under your calves in bed so your feet are floating in the air and no pressure is on the wound.   - Recline back as far as you can go in your recliner and prop your legs on pillows.   Doing these things will help reduce the edema in your legs.     EdemaWear  Then apply Navy Stripe EdemaWear from toes to knee. EdemaWear should be worn 24/7 unless bathing/showering or changing the dressing. You will wash and reuse the EdemaWear. DO NOT CUT THE EDEMAWEAR. IF IT IS TOO LONG THEN CUFF THE EDEMAWEAR (the EdemaWear can shrink length wise with washing).    Compression:   Your compression is   Velcro Compression Wraps  and can be removed at night and put back on first thing in the morning.   Please remove compression dressing if toes turn blue and/or tingle and can not be relieved by raising the leg for one hour. If unable to reapply in the morning, keep compression on until next dressing change.  Walk as much as you can, as you are able. Whenever you sit raise your ankle above your hips to promote wound healing.      Protein:  A diet high in protein is important for wound healing, we recommend getting 90 grams of protein per day. Taking protein shakes or bars are a good way to get extra protein in your diet.   Good sources of protein:  Pork 26g per 3 oz  Whey protein powder - 24g per scoop (on average)  Greek yogurt - 23g per 8oz   Chicken or Turkey - 23g per 3oz  Fish - 20-25g per 3oz  Beef - 18-23g per 3oz  Navy beans - 20g per cup  Cottage cheese - 14g per 1/2 cup   Lentils - 13g per 1/4 cup  Beef jerky 13g per 1oz  2% milk - 8g per cup  Peanut butter - 8g per 2 tablespoons  Eggs - 6g per egg  Mixed nuts - 6g per 2oz    Blood Sugars:  Keep blood sugar below 150 or A1C below 7.0     Woo Lew M.D. October 17, 2023    Call us at [phone] if you have any questions about your wounds, have redness or swelling around your wound, have a fever of 101 degrees Fahrenheit or greater or if you have any other problems or concerns. We answer the phone Monday through Friday 8 am to 4 pm, please leave a message as we check the voicemail frequently throughout the day.     If you had a positive experience please indicate that on your patient satisfaction survey form that [facility] will be sending you.    It was a pleasure meeting with you today.  Thank you for allowing me and my team the privilege of caring for you today.  YOU are the reason we are here, and I truly hope we provided you with the excellent service you deserve.  Please let us know if there is anything else we can do for you so that  we can be sure you are leaving completely satisfied with your care experience.      If you have any billing related questions please call the [facility] Business office at [phone]. The clinic staff does not handle billing related matters.    If you are scheduled to have a follow up appointment, you will receive a reminder call the day before your visit. On the appointment day please arrive 15 minutes prior to your appointment time. If you are unable to keep that appointment, please call the clinic to cancel or reschedule. If you are more than 10 minutes late or greater for your scheduled appointment time, the clinic policy is that you may be asked to reschedule.

## 2023-11-07 ENCOUNTER — HOSPITAL ENCOUNTER (OUTPATIENT)
Dept: WOUND CARE | Facility: CLINIC | Age: 83
Discharge: HOME | End: 2023-11-07
Attending: FAMILY MEDICINE | Admitting: FAMILY MEDICINE
Payer: MEDICARE

## 2023-11-07 VITALS — TEMPERATURE: 97.4 F

## 2023-11-07 DIAGNOSIS — L89.623 STAGE III PRESSURE ULCER OF LEFT HEEL (H): ICD-10-CM

## 2023-11-07 DIAGNOSIS — S81.801D WOUND OF RIGHT LOWER EXTREMITY, SUBSEQUENT ENCOUNTER: Primary | ICD-10-CM

## 2023-11-07 PROCEDURE — 99214 OFFICE O/P EST MOD 30 MIN: CPT | Performed by: FAMILY MEDICINE

## 2023-11-07 PROCEDURE — 97602 WOUND(S) CARE NON-SELECTIVE: CPT

## 2023-11-07 NOTE — PROGRESS NOTES
Wound Clinic Note         Visit date: 11/07/2023       Sarah Complaint:     Jumana Yang is a 83 year old   female had concerns including WOUND CARE.  The patient has lower extremity edema, a left heel wound and a right leg wound.         HISTORY OF PRESENT ILLNESS:    Jumana Yang reports the wound has been present since early August.  The wound began without a clear cause.   Prior to this she has never had other difficult to heal wounds on the lower extremities.    Today she reports she has gotten her new Velcro compression garments but does not know how to apply them.  She does have physical therapy that comes out to help her with strengthening.    The left heel wound has been bandaged with a Mepilex bandage changed 3 times a week by the home health nurses.        The pateint denies fevers or chills.  They report the pain from the wound has been 3/10 and has remained about the same recently.      The patient reports they currently do not have any routine for elevating their legs.  Despite our previous conversation she still has not been laying down to elevate her legs above the level of her heart during the day recently.  She does sleep flat laying on a couch.    Today the patient reports maintaining a high protein diet, but has not been taking protein supplements lately.        The patient denies a history of smoking or chronic steroid use.  and The patient confirms having diabetes and reports the blood sugars are above 200 at least once a day.  and The patient is working with a provider who manages the diabetes to better control the blood sugars.         The patient has not had any symptoms of infection relating to the wound recently and is not currently on antibiotics.       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       Surgical Hx:   Past Surgical History:   Procedure Laterality Date    CATARACT IOL, RT/LT Left 7/2013    FOOT SURGERY      toe    GI SURGERY      IR LUMBAR/SACRAL TRANSFOR INJ BILATERAL Bilateral 3/11/2022    LAPAROSCOPIC HEPATECTOMY PARTIAL  11/4/2013    Procedure: LAPAROSCOPIC HEPATECTOMY PARTIAL;  Laparoscopic Debridement of Liver Abcess;  Surgeon: Sepideh Green MD;  Location: [facility] OR    ORTHOPEDIC SURGERY      PICC INSERTION  8/28/2013    5fr DL Power PICC, 41cm (1cm external length) in the R lateral brachial vein with tip in the SVC RA junction.    RECTAL SURGERY      1970s    RELEASE CARPAL TUNNEL Left 3/21/2023    Procedure: LEFT OPEN CARPAL TUNNEL RELEASE. LEFT RING FINGER A1 PULLEY RELEASE;  Surgeon: Claire Hamilton MD;  Location: [facility] OR    VASCULAR SURGERY            Allergies:    Allergies   Allergen Reactions    Propofol Nausea and Vomiting    Shellfish Allergy      Other reaction(s): Dizziness    Capsaicin Rash and Blisters              Medication History:    Current Outpatient Medications   Medication Sig    acetaminophen (TYLENOL) 325 MG tablet Take 2 tablets (650 mg) by mouth every 4 hours as needed for other (For optimal non-opioid multimodal pain management to improve pain control.) (Patient not taking: Reported on 7/17/2023)     blood glucose (ACCU-CHEK FASTCLIX) lancing device FOR TESTING ONCE DAILY. DX  E11.9 TYPE 2 DIABETES    blood glucose (CONTOUR TEST) test strip TESTING EVERY DAY DX  E11.9    busPIRone (BUSPAR) 7.5 MG tablet Take 7.5 mg by mouth 3 times daily    calamine 8-8 % lotion Apply topically every hour as needed for itching (Patient not taking: Reported on 7/17/2023)    clobetasol (TEMOVATE) 0.05 % external cream Apply topically 2 times daily    CONTOUR NEXT TEST test strip TESTING EVERY DAY DX  E11.9    cyanocobalamin 1000 MCG SUBL Place 1,000 mcg under the tongue daily    diazepam (VALIUM) 2 MG tablet Take 1 tablet (2 mg) by mouth every 6 hours as needed for anxiety    DULoxetine (CYMBALTA) 60 MG capsule Take 60 mg by mouth every morning    glipiZIDE (GLUCOTROL) 5 MG tablet Take 1 tablet (5 mg) by mouth 2 times daily (before meals)    hydrOXYzine (ATARAX) 25 MG tablet Take 25 mg by mouth 3 times daily as needed for anxiety    JARDIANCE 25 MG TABS tablet Take 25 mg by mouth every morning    lactulose (CHRONULAC) 10 GM/15ML solution Take 15 mLs (10 g) by mouth every 8 hours as needed for constipation (Patient not taking: Reported on 7/17/2023)    lamoTRIgine (LAMICTAL) 100 MG tablet Take 100 mg by mouth 2 times daily    lisinopril (ZESTRIL) 5 MG tablet Take 5 mg by mouth daily    melatonin 3 MG tablet Take 1 tablet (3 mg) by mouth nightly as needed for sleep (Patient not taking: Reported on 7/17/2023)    metFORMIN (GLUCOPHAGE) 500 MG tablet Take 500 mg by mouth 2 times daily (with meals)    methocarbamol (ROBAXIN) 500 MG tablet Take 1 tablet (500 mg) by mouth 3 times daily as needed for muscle spasms    miconazole (MICATIN) 2 % external powder Apply topically 2 times daily as needed To the skin fold    naloxone (NARCAN) 4 MG/0.1ML nasal spray Spray 1 spray (4 mg) into one nostril alternating nostrils as needed for opioid reversal every 2-3 minutes until assistance arrives    nystatin (MYCOSTATIN) 433183 UNIT/ML suspension  Take 5 mLs (500,000 Units) by mouth 4 times daily x7 days through 4/23    omeprazole (PRILOSEC) 20 MG DR capsule Take 20 mg by mouth daily    ondansetron (ZOFRAN ODT) 4 MG ODT tab Take 1 tablet (4 mg) by mouth every 6 hours as needed for nausea or vomiting    order for DME Equipment being ordered: wrist brace    oxybutynin ER (DITROPAN XL) 15 MG 24 hr tablet Take 15 mg by mouth every morning    oxyCODONE (ROXICODONE) 5 MG tablet Take 1-2 tablets (5-10 mg) by mouth every 6 hours as needed for moderate pain 1 if pain rated 1-5 & 2 if 6-10.    polyethylene glycol (MIRALAX) 17 GM/Dose powder Take 17 g by mouth 2 times daily as needed for constipation    predniSONE (DELTASONE) 1 MG tablet Take 1 mg by mouth every morning (In addition to the 5 mg dose; 1 mg + 5 mg = 6 mg)    predniSONE (DELTASONE) 5 MG tablet Take 5 mg by mouth every morning (In addition to the 1 mg dose; 5 mg + 1 mg = 6 mg)    pregabalin (LYRICA) 50 MG capsule Take 1 capsule (50 mg) by mouth 2 times daily    senna-docusate (SENOKOT-S/PERICOLACE) 8.6-50 MG tablet Take 1 tablet by mouth 2 times daily as needed for constipation    triamcinolone (KENALOG) 0.1 % external cream Apply topically 2 times daily as needed    triamterene-HCTZ (DYAZIDE) 37.5-25 MG capsule Take 1 capsule by mouth every morning Hold for SBP<110    vitamin D3 (CHOLECALCIFEROL) 50 mcg (2000 units) tablet Take 50 mcg by mouth daily     No current facility-administered medications for this encounter.         Tobacco History:  reports that she quit smoking about 36 years ago. Her smoking use included cigarettes. She has quit using smokeless tobacco.       REVIEW OF SYMPTOMS:   The review of systems was negative except as noted in the HPI.           PHYSICAL EXAMINATION:     Temp 97.4  F (36.3  C) (Temporal)   LMP  (LMP Unknown)            GENERAL: The patient overall appears well and is no acute distress.   HEAD: normocephalic   EYES: Sclera and conjunctiva clear   NECK: no obvious masses    LUNGS: breathing is unlabored.   EXTREMITIES: No clubbing, cyanosis or edema   SKIN: No rashes or other abnormalities except as noted under the Wound section below.   NEUROLOGICAL: normal motor and sensory function   EDEMA: Moderate  and Lymphedema       WOUND: The wound appears healthy with no sign of infection.   Wound bed: granulation tissue  Periwound: healthy intact skin  The right leg wound is still healed.  The left heel wound is again smaller compared with her last clinic visit.      Also see below for wound details:       Circumferential volume measures:             No data to display                Ulceration(s)/Wound(s):   Please see the media tab under the chart review for pictures of the wounds.  Nursing staff removed dressings and cleansed wound.    Wound (used by OP [facility] only) 09/27/23 1326 Left heel pressure injury (Active)   Thickness/Stage Stage 3 11/07/23 1243   Base pink;yellow 11/07/23 1243   Periwound macerated 11/07/23 1243   Periwound Temperature warm 11/07/23 1243   Periwound Skin Turgor soft 11/07/23 1243   Edges open 11/07/23 1243   Length (cm) 1 11/07/23 1243   Width (cm) 1 11/07/23 1243   Depth (cm) 0.1 11/07/23 1243   Wound (cm^2) 1 cm^2 11/07/23 1243   Wound Volume (cm^3) 0.1 cm^3 11/07/23 1243   Wound healing % 64.91 11/07/23 1243   Drainage Characteristics/Odor serosanguineous 11/07/23 1243   Drainage Amount moderate 11/07/23 1243   Care, Wound non-select wound debridement performed. 11/07/23 1243                 Recent Labs   Lab Test 03/21/23  1025 03/08/22  0626 07/20/21  0614   A1C 6.9* 8.8* 10.8*          Recent Labs   Lab Test 05/22/23  1316 07/03/22  2204 06/16/22  2302   ALBUMIN 3.5 3.8 3.6              No sharp debridement performed today.                  ASSESSMENT:   This is a 83 year old  female with a stage III left heel pressure injury and a right leg wound, the patient also has lower extremity edema which was also managed during today's clinic visit.          PLAN:    We will bandage the left heel wound with endoform and a Mepilex bandage and her Velcro compression garment change 3 times a week.  I suggested that she have the physical therapist show her how to apply the Velcro compression garment.  Alternatively if it is easier she can just get a pair of over-the-counter compression stockings.    I have encouraged her to continue to keep pressure off of the heel area as much as she can during the day and the night.  On October 9, 2023 she had an ankle-brachial index checked which did not show any evidence of arterial insufficiency.      Separate from the wound care instructions we then discussed management strategies for lower extremity edema.  I explained the keys for managing lower extremity edema are compression and elevation.  I have again explained to the patient today that controlling the edema is probably the most important thing we can do to help heal the wound.  I have specifically recommended that they lay down with their legs above the level of the heart for 30 minutes at least twice a day.  I emphasized that if we can not control the edema we will likely not be able to get this wound to heal.     I have encouraged the patient to continue on their high protein diet to aid in wound healing.   The patient will return to the wound clinic in 4 weeks to see me again.        30 minutes spent on the date of the encounter doing chart review, history and exam, documentation and further activities per the note, this time excludes any procedure time      Copied text has been reviewed and appropriate changes were made.      Woo Lew MD  11/07/2023   2:41 PM   [facility] Vascular/Wound  [phone]    This note was electronically signed by Woo Lew MD        Further instructions from your care team         Jumana Yang      1940      A DME order for supplies has been placed to [facility], [address]. If there are any issues with  your order including not receiving the order please call [facility] at [phone] option 1. They can also provide a tracking number for you if you had supplies shipped to you.     Please ship supplies to yan Yang at [address] [facility] [address] [facility] [address]     Dressing changes outside of clinic are being performed by Patient and PCA     Plan:  -Try to minimize placing pressure to heel - especially when you are moving around with the wheelchair   -Do not shower with dressings on; take them off before shower. Do wound care afterward (including compression)     Wound Dressing Change: left heel  - Wash your hands with soap and water before you begin your dressing change and prepare a clean surface for dressings.  - Cleanse with mild unscented soap and water (such as Cetaphil, Cerave or Dove)   - Apply 1/6 Endoform Natural (cut-to-fit size of wound); moisten with saline or water prior to application  - Apply one 4x4 Zetuvit plus silicone border  - Apply thick, high-quality moisturizer to intact skin on both legs (such as CeraVe, Eucerin, Aquaphor or Vanicream)   - Apply compression stocking (OTC 15-20mmHg knee-high)--purchase at [facility] [address] today  Change daily    Right leg skin care, daily:  - Cleanse with mild unscented soap and water (such as Cetaphil, Cerave or Dove)   - Apply thick, high-quality moisturizer to intact skin (such as CeraVe, Eucerin, Aquaphor or Vanicream)  - Apply compression stocking (OTC 15-20mmHg knee-high)     Elevation:  It is recommended that you elevate your legs above the level of your heart for 30 minutes: 2-3 times each day   Ways to do this:   - Lay on the couch or your bed and prop your legs up on pillows. Pillows should be under your calves in bed so your feet are floating in the air and no pressure is on the wound.   - Recline back as far as you can go in your recliner and prop your legs on pillows.   Doing these things will help reduce the edema in your  legs.     Compression:  EdemaWear should be worn 24/7 unless bathing/showering or changing the dressing. You will wash and reuse the EdemaWear. DO NOT CUT THE EDEMAWEAR. IF IT IS TOO LONG THEN CUFF THE EDEMAWEAR (the EdemaWear can shrink length wise with washing).  Velcro Compression Wraps  and can be removed at night and put back on first thing in the morning.   Please remove compression dressing if toes turn blue and/or tingle and can not be relieved by raising the leg for one hour. If unable to reapply in the morning, keep compression on until next dressing change.    Walk as much as you can, as you are able.      Protein:  A diet high in protein is important for wound healing, we recommend getting up to 90 grams of protein per day. Taking protein shakes or bars are a good way to get extra protein in your diet.      Blood Sugars:  Keep blood sugar below 150 or A1C below 7.0       Woo Lew M.D. November 7, 2023    Call us at [phone] if you have any questions about your wounds, have redness or swelling around your wound, have a fever of 101 degrees Fahrenheit or greater or if you have any other problems or concerns. We answer the phone Monday through Friday 8 am to 4 pm, please leave a message as we check the voicemail frequently throughout the day.     If you had a positive experience please indicate that on your patient satisfaction survey form that [facility] will be sending you.    It was a pleasure meeting with you today.  Thank you for allowing me and my team the privilege of caring for you today.  YOU are the reason we are here, and I truly hope we provided you with the excellent service you deserve.  Please let us know if there is anything else we can do for you so that we can be sure you are leaving completely satisfied with your care experience.      If you have any billing related questions please call the [facility] Business office at [phone]. The clinic staff does not handle  billing related matters.    If you are scheduled to have a follow up appointment, you will receive a reminder call the day before your visit. On the appointment day please arrive 15 minutes prior to your appointment time. If you are unable to keep that appointment, please call the clinic to cancel or reschedule. If you are more than 10 minutes late or greater for your scheduled appointment time, the clinic policy is that you may be asked to reschedule.        ,

## 2023-11-07 NOTE — DISCHARGE INSTRUCTIONS
Jumana WINTERS Trey      1940      A DME order for supplies has been placed to [facility], [address]. If there are any issues with your order including not receiving the order please call [facility] at [phone] option 1. They can also provide a tracking number for you if you had supplies shipped to you.     Please ship supplies to yan Yang at [address] [facility] [facility] [address]     Dressing changes outside of clinic are being performed by Patient and PCA     Plan:  -Try to minimize placing pressure to heel - especially when you are moving around with the wheelchair   -Do not shower with dressings on; take them off before shower. Do wound care afterward (including compression)     Wound Dressing Change: left heel  - Wash your hands with soap and water before you begin your dressing change and prepare a clean surface for dressings.  - Cleanse with mild unscented soap and water (such as Cetaphil, Cerave or Dove)   - Apply 1/6 Endoform Natural (cut-to-fit size of wound); moisten with saline or water prior to application  - Apply one 4x4 Zetuvit plus silicone border  - Apply thick, high-quality moisturizer to intact skin on both legs (such as CeraVe, Eucerin, Aquaphor or Vanicream)   - Apply compression stocking (OTC 15-20mmHg knee-high)--purchase at [facility] [address] today  Change daily    Right leg skin care, daily:  - Cleanse with mild unscented soap and water (such as Cetaphil, Cerave or Dove)   - Apply thick, high-quality moisturizer to intact skin (such as CeraVe, Eucerin, Aquaphor or Vanicream)  - Apply compression stocking (OTC 15-20mmHg knee-high)     Elevation:  It is recommended that you elevate your legs above the level of your heart for 30 minutes: 2-3 times each day   Ways to do this:   - Lay on the couch or your bed and prop your legs up on pillows. Pillows should be under your calves in bed so your feet are floating in the air and no pressure is on the wound.   -  Recline back as far as you can go in your recliner and prop your legs on pillows.   Doing these things will help reduce the edema in your legs.     Compression:  EdemaWear should be worn 24/7 unless bathing/showering or changing the dressing. You will wash and reuse the EdemaWear. DO NOT CUT THE EDEMAWEAR. IF IT IS TOO LONG THEN CUFF THE EDEMAWEAR (the EdemaWear can shrink length wise with washing).  Velcro Compression Wraps  and can be removed at night and put back on first thing in the morning.   Please remove compression dressing if toes turn blue and/or tingle and can not be relieved by raising the leg for one hour. If unable to reapply in the morning, keep compression on until next dressing change.    Walk as much as you can, as you are able.      Protein:  A diet high in protein is important for wound healing, we recommend getting up to 90 grams of protein per day. Taking protein shakes or bars are a good way to get extra protein in your diet.      Blood Sugars:  Keep blood sugar below 150 or A1C below 7.0       Woo Lew M.D. November 7, 2023    Call us at [phone] if you have any questions about your wounds, have redness or swelling around your wound, have a fever of 101 degrees Fahrenheit or greater or if you have any other problems or concerns. We answer the phone Monday through Friday 8 am to 4 pm, please leave a message as we check the voicemail frequently throughout the day.     If you had a positive experience please indicate that on your patient satisfaction survey form that [facility] will be sending you.    It was a pleasure meeting with you today.  Thank you for allowing me and my team the privilege of caring for you today.  YOU are the reason we are here, and I truly hope we provided you with the excellent service you deserve.  Please let us know if there is anything else we can do for you so that we can be sure you are leaving completely satisfied with your care experience.       If you have any billing related questions please call the [facility] Business office at [phone]. The clinic staff does not handle billing related matters.    If you are scheduled to have a follow up appointment, you will receive a reminder call the day before your visit. On the appointment day please arrive 15 minutes prior to your appointment time. If you are unable to keep that appointment, please call the clinic to cancel or reschedule. If you are more than 10 minutes late or greater for your scheduled appointment time, the clinic policy is that you may be asked to reschedule.

## 2023-12-06 ENCOUNTER — TELEPHONE (OUTPATIENT)
Dept: UROLOGY | Facility: CLINIC | Age: 83
End: 2023-12-06
Payer: MEDICARE

## 2023-12-06 NOTE — TELEPHONE ENCOUNTER
[facility] Call Center    Phone Message    May a detailed message be left on voicemail: no     Reason for Call: Other: Megan is calling to get some more information for patients cart service.Please call Megan back at [phone] and options two and ask for megan.     Action Taken: Message routed to:  Other: [facility]     Travel Screening: Not Applicable

## 2023-12-07 NOTE — TELEPHONE ENCOUNTER
Attempted to call number.  Unable to get connected with Doug to find out more information about what she needs.  No record of Dr. Foreman being a part of this patient's care.

## 2023-12-08 ENCOUNTER — TELEPHONE (OUTPATIENT)
Dept: VASCULAR SURGERY | Facility: CLINIC | Age: 83
End: 2023-12-08
Payer: MEDICARE

## 2023-12-08 NOTE — TELEPHONE ENCOUNTER
Caller: ORLIN Go    Provider: MD Woo Lew    Detailed reason for call: Abbi wanted to give an update on wound status. Per Abbi, the PCA reported that their was an odor when changing the heel wound. No drainage. Wound appears clean per Abbi. No signs or symptoms of infection per Abbi and PCA. Patient had appointment on 12/6 but cancelled the appointment and rescheduled for 12/19 with Dr. Lew. Advised to have patient be seen in urgent care if signs of infection occur between now and appointment, or call give clinic a call during business hours.     Best phone number to contact: [phone]    Best time to contact: Anytime    Ok to leave a detailed message: Yes      (Noted to patient if reason is related to wound or incision, to please send a photo via email or [employer].)

## 2023-12-09 ENCOUNTER — OFFICE VISIT (OUTPATIENT)
Dept: URGENT CARE | Facility: URGENT CARE | Age: 83
End: 2023-12-09
Payer: MEDICARE

## 2023-12-09 VITALS
OXYGEN SATURATION: 100 % | TEMPERATURE: 98.4 F | SYSTOLIC BLOOD PRESSURE: 151 MMHG | HEART RATE: 132 BPM | DIASTOLIC BLOOD PRESSURE: 62 MMHG

## 2023-12-09 DIAGNOSIS — L97.429 HEEL ULCERATION, LEFT, WITH UNSPECIFIED SEVERITY (H): Primary | ICD-10-CM

## 2023-12-09 PROCEDURE — 99214 OFFICE O/P EST MOD 30 MIN: CPT | Performed by: FAMILY MEDICINE

## 2023-12-09 NOTE — PROGRESS NOTES
SUBJECTIVE:  Chief Complaint   Patient presents with    LEFT FOOT ULCER WITH ODOR     Here with PCA    Jumana Yang is a 83 year old female who presents with a chief complaint of left foot ulcer wound.    Developed odor and drainage about 5 days ago on left heel.    Has been struggling with lymphedema and wound ulcer.  Was putting dressing on area, slow healing, follow by wound care.  No fever.    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 DX  E11.9 TYPE 2 DIABETES      blood glucose (CONTOUR TEST) test strip TESTING EVERY DAY DX  E11.9      busPIRone (BUSPAR) 7.5 MG tablet Take 7.5 mg by mouth 3 times daily      CONTOUR NEXT TEST test strip TESTING EVERY DAY DX  E11.9      diazepam (VALIUM) 2 MG tablet Take 1 tablet (2 mg) by mouth every 6 hours as needed for anxiety 10 tablet 0    DULoxetine (CYMBALTA) 60 MG capsule Take 60 mg by mouth every morning      glipiZIDE (GLUCOTROL) 5 MG tablet Take 1 tablet (5 mg) by mouth 2 times daily (before meals)      hydrOXYzine (ATARAX) 25 MG tablet Take 25 mg by mouth 3 times daily  as needed for anxiety      lamoTRIgine (LAMICTAL) 100 MG tablet Take 100 mg by mouth 2 times daily      lisinopril (ZESTRIL) 5 MG tablet Take 5 mg by mouth daily      melatonin 3 MG tablet Take 1 tablet (3 mg) by mouth nightly as needed for sleep      metFORMIN (GLUCOPHAGE) 500 MG tablet Take 500 mg by mouth 2 times daily (with meals)      methocarbamol (ROBAXIN) 500 MG tablet Take 1 tablet (500 mg) by mouth 3 times daily as needed for muscle spasms 60 tablet 0    miconazole (MICATIN) 2 % external powder Apply topically 2 times daily as needed To the skin fold      omeprazole (PRILOSEC) 20 MG DR capsule Take 20 mg by mouth daily      ondansetron (ZOFRAN ODT) 4 MG ODT tab Take 1 tablet (4 mg) by mouth every 6 hours as needed for nausea or vomiting      order for DME Equipment being ordered: wrist brace 1 Device 0    oxybutynin ER (DITROPAN XL) 15 MG 24 hr tablet Take 15 mg by mouth every morning      oxyCODONE (ROXICODONE) 5 MG tablet Take 1-2 tablets (5-10 mg) by mouth every 6 hours as needed for moderate pain 1 if pain rated 1-5 & 2 if 6-10. 30 tablet 0    predniSONE (DELTASONE) 1 MG tablet Take 1 mg by mouth every morning (In addition to the 5 mg dose; 1 mg + 5 mg = 6 mg)      predniSONE (DELTASONE) 5 MG tablet Take 5 mg by mouth every morning (In addition to the 1 mg dose; 5 mg + 1 mg = 6 mg)      pregabalin (LYRICA) 50 MG capsule Take 1 capsule (50 mg) by mouth 2 times daily 30 capsule 0    triamterene-HCTZ (DYAZIDE) 37.5-25 MG capsule Take 1 capsule by mouth every morning Hold for SBP<110      vitamin D3 (CHOLECALCIFEROL) 50 mcg (2000 units) tablet Take 50 mcg by mouth daily      acetaminophen (TYLENOL) 325 MG tablet Take 2 tablets (650 mg) by mouth every 4 hours as needed for other (For optimal non-opioid multimodal pain management to improve pain control.) (Patient not taking: Reported on 7/17/2023) 100 tablet 0    calamine 8-8 % lotion Apply topically every hour as needed for itching (Patient not taking:  Reported on 2023) 118 mL 0    clobetasol (TEMOVATE) 0.05 % external cream Apply topically 2 times daily (Patient not taking: Reported on 2023)      cyanocobalamin 1000 MCG SUBL Place 1,000 mcg under the tongue daily (Patient not taking: Reported on 2023)      JARDIANCE 25 MG TABS tablet Take 25 mg by mouth every morning (Patient not taking: Reported on 2023)      lactulose (CHRONULAC) 10 GM/15ML solution Take 15 mLs (10 g) by mouth every 8 hours as needed for constipation (Patient not taking: Reported on 2023)      naloxone (NARCAN) 4 MG/0.1ML nasal spray Spray 1 spray (4 mg) into one nostril alternating nostrils as needed for opioid reversal every 2-3 minutes until assistance arrives (Patient not taking: Reported on 2023) 0.2 mL     nystatin (MYCOSTATIN) 703412 UNIT/ML suspension Take 5 mLs (500,000 Units) by mouth 4 times daily x7 days through  (Patient not taking: Reported on 2023)      polyethylene glycol (MIRALAX) 17 GM/Dose powder Take 17 g by mouth 2 times daily as needed for constipation (Patient not taking: Reported on 2023) 510 g PRN    senna-docusate (SENOKOT-S/PERICOLACE) 8.6-50 MG tablet Take 1 tablet by mouth 2 times daily as needed for constipation (Patient not taking: Reported on 2023)      triamcinolone (KENALOG) 0.1 % external cream Apply topically 2 times daily as needed (Patient not taking: Reported on 2023)       Social History     Tobacco Use    Smoking status: Former     Types: Cigarettes     Quit date: 11/15/1987     Years since quittin.0    Smokeless tobacco: Former   Substance Use Topics    Alcohol use: Not Currently     Alcohol/week: 0.0 standard drinks of alcohol     Comment: MALISSA white since        ROS:  Review of systems negative except as stated above.    EXAM:   BP (!) 151/62   Pulse (!) 132   Temp 98.4  F (36.9  C)   LMP  (LMP Unknown)   SpO2 100%   GENERAL APPEARANCE: healthy, alert and no distress, sitting in  wheelchair  EXTREMITIES: peripheral pulses normal, chronic edema, left heel with ulceration with surrounding erythema, no overt drainage  PSYCH:alert, affect bright    X-RAY was not done.    ASSESSMENT/PLAN:  (L97.819) Heel ulceration, left, with unspecified severity (H)  (primary encounter diagnosis)  Comment: wound infection  Plan: amoxicillin-clavulanate (AUGMENTIN) 875-125 MG         tablet            Concerning for local wound infection, has a chronic left heel ulceration and getting wound care through wound clinic.  Discussed that infection can occur and as patient reported more odor and drainage, will start antibiotic treatment - RX Augmentin given.  Okay to continue with current wound care per wound clinic, recommend to contact wound clinic regarding if needs to be seen sooner    Follow up with primary provider if no improvement of symptoms within 1-2 week    Felipe Barrios MD  December 9, 2023 9:00 PM

## 2023-12-10 ENCOUNTER — TELEPHONE (OUTPATIENT)
Dept: URGENT CARE | Facility: URGENT CARE | Age: 83
End: 2023-12-10
Payer: MEDICARE

## 2023-12-10 NOTE — PATIENT INSTRUCTIONS
Take full course of antibiotic for left heel skin infection    Continue with current wound care per wound care clinic

## 2023-12-10 NOTE — TELEPHONE ENCOUNTER
Spoke to patient son and he states to disregard the message. She already picked up the medication.

## 2023-12-11 ENCOUNTER — TELEPHONE (OUTPATIENT)
Dept: OBGYN | Facility: CLINIC | Age: 83
End: 2023-12-11
Payer: MEDICARE

## 2023-12-11 NOTE — TELEPHONE ENCOUNTER
Received call from  services regarding patient's upcoming appointment. Representative requested e-mail for clinic supervisor so they can send ASL cart instructions over to the clinic. Provided representative with clinic manager's email.

## 2023-12-14 ENCOUNTER — OFFICE VISIT (OUTPATIENT)
Dept: UROLOGY | Facility: CLINIC | Age: 83
End: 2023-12-14
Attending: OBSTETRICS & GYNECOLOGY
Payer: MEDICARE

## 2023-12-14 VITALS
BODY MASS INDEX: 31.62 KG/M2 | HEART RATE: 90 BPM | HEIGHT: 65 IN | DIASTOLIC BLOOD PRESSURE: 76 MMHG | SYSTOLIC BLOOD PRESSURE: 138 MMHG

## 2023-12-14 DIAGNOSIS — F31.9 BIPOLAR 1 DISORDER (H): ICD-10-CM

## 2023-12-14 DIAGNOSIS — N18.31 STAGE 3A CHRONIC KIDNEY DISEASE (H): ICD-10-CM

## 2023-12-14 DIAGNOSIS — M05.741 RHEUMATOID ARTHRITIS INVOLVING BOTH HANDS WITH POSITIVE RHEUMATOID FACTOR (H): ICD-10-CM

## 2023-12-14 DIAGNOSIS — I73.9 CLAUDICATION OF BOTH LOWER EXTREMITIES (H): ICD-10-CM

## 2023-12-14 DIAGNOSIS — L89.623 STAGE III PRESSURE ULCER OF LEFT HEEL (H): ICD-10-CM

## 2023-12-14 DIAGNOSIS — M05.742 RHEUMATOID ARTHRITIS INVOLVING BOTH HANDS WITH POSITIVE RHEUMATOID FACTOR (H): ICD-10-CM

## 2023-12-14 DIAGNOSIS — N36.42 INTRINSIC URETHRAL SPHINCTER DEFICIENCY: ICD-10-CM

## 2023-12-14 DIAGNOSIS — G83.4 CAUDA EQUINA COMPRESSION (H): ICD-10-CM

## 2023-12-14 DIAGNOSIS — N39.41 URGE INCONTINENCE: Primary | ICD-10-CM

## 2023-12-14 PROCEDURE — G0463 HOSPITAL OUTPT CLINIC VISIT: HCPCS | Performed by: OBSTETRICS & GYNECOLOGY

## 2023-12-14 PROCEDURE — 99215 OFFICE O/P EST HI 40 MIN: CPT | Performed by: OBSTETRICS & GYNECOLOGY

## 2023-12-14 RX ORDER — ACETAMINOPHEN 325 MG/1
975 TABLET ORAL ONCE
Status: CANCELLED | OUTPATIENT
Start: 2023-12-14 | End: 2023-12-14

## 2023-12-14 RX ORDER — SOLIFENACIN SUCCINATE 5 MG/1
5 TABLET, FILM COATED ORAL DAILY
Qty: 90 TABLET | Refills: 3 | Status: SHIPPED | OUTPATIENT
Start: 2023-12-14 | End: 2023-12-28

## 2023-12-14 NOTE — PROGRESS NOTES
December 14, 2023    Referring Provider: Referred Self, MD  No address on file    Primary Care Provider: Ananya Mclean    CC: urinary incontinence    HPI:  Jumana Yang is a 83 year old female who presents for evaluation of her pelvic floor symptoms.  She has a long h/o mixed urinary incontinence. Tonya reports that she has been on oxybutynin for a long period. It had initially worked for her symptoms, now it is not helping.  reports urgency and urge incontinence as well as leakage on moving from her wheelchair.      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    Hepatic abscess     Hyperlipidemia     Hypertension     Hypothyroidism     Irritable bowel syndrome     Meniere disease     Neuropathy     Noninfectious ileitis     Peritoneal abscess (H)     Spinal stenosis of lumbar region     Steroid long-term use     Vaginitis, atrophic, postmenopausal     Vitamin D deficiency        Past Surgical History:   Procedure Laterality Date    CATARACT IOL, RT/LT Left 7/2013    FOOT SURGERY      toe    GI SURGERY      IR LUMBAR/SACRAL TRANSFOR INJ BILATERAL Bilateral 3/11/2022    LAPAROSCOPIC HEPATECTOMY PARTIAL  11/4/2013    Procedure: LAPAROSCOPIC HEPATECTOMY PARTIAL;  Laparoscopic Debridement of Liver Abcess;  Surgeon: Sepideh Green MD;  Location: [facility] OR    ORTHOPEDIC SURGERY      PICC INSERTION  8/28/2013    5fr DL Power PICC, 41cm (1cm external length) in the R lateral brachial vein with tip in the SVC RA junction.    RECTAL SURGERY      1970s    RELEASE CARPAL TUNNEL Left 3/21/2023    Procedure: LEFT OPEN CARPAL TUNNEL RELEASE.  LEFT RING FINGER A1 PULLEY RELEASE;  Surgeon: Claire Hamilton MD;  Location: [facility] OR    VASCULAR SURGERY         Social History     Socioeconomic History    Marital status:      Spouse name: Not on file    Number of children: Not on file    Years of education: Not on file    Highest education level: Not on file   Occupational History    Not on file   Tobacco Use    Smoking status: Former     Types: Cigarettes     Quit date: 11/15/1987     Years since quittin.1    Smokeless tobacco: Former   Substance and Sexual Activity    Alcohol use: Not Currently     Alcohol/week: 0.0 standard drinks of alcohol     Comment: MALISSA white since     Drug use: No     Comment: used marijuanna in the past    Sexual activity: Never     Partners: Male   Other Topics Concern    Parent/sibling w/ CABG, MI or angioplasty before 65F 55M? No   Social History Narrative    ** Merged History Encounter **          Social Determinants of Health     Financial Resource Strain: Not on file   Food Insecurity: Not on file   Transportation Needs: Not on file   Physical Activity: Not on file   Stress: Not on file   Social Connections: Not on file   Interpersonal Safety: Not on file   Housing Stability: Not on file       Family History   Problem Relation Age of Onset    Cerebrovascular Disease Mother     Heart Disease Mother     Heart Disease Father     Heart Disease Brother     Diabetes No family hx of     Coronary Artery Disease No family hx of     Hypertension No family hx of     Hyperlipidemia No family hx of     Breast Cancer No family hx of     Colon Cancer No family hx of     Prostate Cancer No family hx of     Other Cancer No family hx of     Depression No family hx of     Anxiety Disorder No family hx of     Mental Illness No family hx of     Substance Abuse No family hx of     Anesthesia Reaction No family hx of     Asthma No family hx of     Osteoporosis No family hx of     Genetic Disorder No family hx of     Thyroid  "Disease No family hx of     Obesity No family hx of     Unknown/Adopted No family hx of        ROS    Allergies   Allergen Reactions    Propofol Nausea and Vomiting    Shellfish Allergy      Other reaction(s): Dizziness    Capsaicin Rash and Blisters       Current Outpatient Medications   Medication    amoxicillin-clavulanate (AUGMENTIN) 875-125 MG tablet    blood glucose (ACCU-CHEK FASTCLIX) lancing device    blood glucose (CONTOUR TEST) test strip    busPIRone (BUSPAR) 7.5 MG tablet    diazepam (VALIUM) 2 MG tablet    DULoxetine (CYMBALTA) 60 MG capsule    glipiZIDE (GLUCOTROL) 5 MG tablet    hydrOXYzine (ATARAX) 25 MG tablet    lamoTRIgine (LAMICTAL) 100 MG tablet    lisinopril (ZESTRIL) 5 MG tablet    melatonin 3 MG tablet    metFORMIN (GLUCOPHAGE) 500 MG tablet    methocarbamol (ROBAXIN) 500 MG tablet    miconazole (MICATIN) 2 % external powder    omeprazole (PRILOSEC) 20 MG DR capsule    ondansetron (ZOFRAN ODT) 4 MG ODT tab    order for DME    oxyCODONE (ROXICODONE) 5 MG tablet    predniSONE (DELTASONE) 1 MG tablet    predniSONE (DELTASONE) 5 MG tablet    pregabalin (LYRICA) 50 MG capsule    solifenacin (VESICARE) 5 MG tablet    triamterene-HCTZ (DYAZIDE) 37.5-25 MG capsule    vitamin D3 (CHOLECALCIFEROL) 50 mcg (2000 units) tablet    acetaminophen (TYLENOL) 325 MG tablet    calamine 8-8 % lotion    clobetasol (TEMOVATE) 0.05 % external cream    CONTOUR NEXT TEST test strip    cyanocobalamin 1000 MCG SUBL    JARDIANCE 25 MG TABS tablet    lactulose (CHRONULAC) 10 GM/15ML solution    naloxone (NARCAN) 4 MG/0.1ML nasal spray    nystatin (MYCOSTATIN) 507335 UNIT/ML suspension    polyethylene glycol (MIRALAX) 17 GM/Dose powder    senna-docusate (SENOKOT-S/PERICOLACE) 8.6-50 MG tablet    triamcinolone (KENALOG) 0.1 % external cream     No current facility-administered medications for this visit.       /76   Pulse 90   Ht 1.651 m (5' 5\")   LMP  (LMP Unknown)   BMI 31.62 kg/m   No LMP recorded (lmp " unknown). Patient is postmenopausal. Body mass index is 31.62 kg/m .  Ms.. Yang is alert, comfortable in no acute distress, non-labored breathing.   Abdomen is soft, non-tender, non-distended, no CVAT.    Atrophic female genitalia. The urethra was normal.    She has good support on supine strain.    SST - Pos  Void -  PVR -     A/P: Jumana Yang is a 83 year old F with mixed urinary incontinence    Stop oxybutynin. Start Vesicare 5mg   Will schedule urethral bulking    I spent a total of 45 minutes with  Ms. Yang  on the date of the encounter in chart review, face to face patient visit, review of tests, documentation and/or discussion with other providers about the issues documented above.    Jorge Foreman MD  Professor, OB/GYN  Urogynecologist  CC  Patient Care Team:  Ananya Mclean NP as PCP - General (Nurse Practitioner - Adult Health)  Herminio Goodman MBBS as Assigned Rheumatology Provider  Abbi Caro AuD as Audiologist (Audiology)  Ananya LOPEZ as   Ibeth Foster MD as Anesthesiologist (Pain Medicine)  Ibeth Foster MD as Anesthesiologist (Pain Medicine)  Ananya Mclean NP (Nurse Practitioner - Adult Health)  Caitlin Osullivan PA-C as Assigned Heart and Vascular Provider  Nissen, Rick L, MD as Assigned Surgical Provider  [facility]  Davina Hatfield AuD as Audiologist (Audiology)  Jorge Foreman MD as MD (OB/Gyn)  SELF, REFERRED

## 2023-12-14 NOTE — PATIENT INSTRUCTIONS
Thank you for trusting us with your care!     If you need to contact us for questions about:  Symptoms, Scheduling & Medical Questions; Non-urgent (2-3 day response) Anirudh message, Urgent (needing response today) [phone] (if after 3:30pm next day response)   Prescriptions: Please call your Pharmacy   Billing: Bailee [phone] or [facility]:[phone]

## 2023-12-14 NOTE — LETTER
12/14/2023       RE: Jumana Yang  [address]  [place] [address]     Dear Colleague,    Thank you for referring your patient, Jumana Yang, to the [facility] [place] [facility]. Please see a copy of my visit note below.    December 14, 2023    Referring Provider: Referred Self, MD  No address on file    Primary Care Provider: Ananya Mclean    CC: urinary incontinence    HPI:  Jumana Yang is a 83 year old female who presents for evaluation of her pelvic floor symptoms.  She has a long h/o mixed urinary incontinence. Tonya reports that she has been on oxybutynin for a long period. It had initially worked for her symptoms, now it is not helping. Se reports urgency and urge incontinence as well as leakage on moving from her wheelchair.      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    Hepatic abscess     Hyperlipidemia     Hypertension     Hypothyroidism     Irritable bowel syndrome     Meniere disease     Neuropathy     Noninfectious ileitis     Peritoneal abscess (H)     Spinal stenosis of lumbar region     Steroid long-term use     Vaginitis, atrophic, postmenopausal     Vitamin D deficiency        Past Surgical History:   Procedure Laterality Date    CATARACT IOL, RT/LT Left 7/2013    FOOT SURGERY      toe    GI SURGERY      IR LUMBAR/SACRAL TRANSFOR INJ BILATERAL Bilateral 3/11/2022    LAPAROSCOPIC HEPATECTOMY PARTIAL  11/4/2013    Procedure: LAPAROSCOPIC HEPATECTOMY PARTIAL;  Laparoscopic Debridement of Liver Abcess;  Surgeon: Sepideh Green  MD Keiko;  Location: [facility] OR    ORTHOPEDIC SURGERY      PICC INSERTION  2013    5fr DL Power PICC, 41cm (1cm external length) in the R lateral brachial vein with tip in the SVC RA junction.    RECTAL SURGERY      1970s    RELEASE CARPAL TUNNEL Left 3/21/2023    Procedure: LEFT OPEN CARPAL TUNNEL RELEASE. LEFT RING FINGER A1 PULLEY RELEASE;  Surgeon: Claire Hamilton MD;  Location: [facility] OR    VASCULAR SURGERY         Social History     Socioeconomic History    Marital status:      Spouse name: Not on file    Number of children: Not on file    Years of education: Not on file    Highest education level: Not on file   Occupational History    Not on file   Tobacco Use    Smoking status: Former     Types: Cigarettes     Quit date: 11/15/1987     Years since quittin.1    Smokeless tobacco: Former   Substance and Sexual Activity    Alcohol use: Not Currently     Alcohol/week: 0.0 standard drinks of alcohol     Comment: MALISSA white since     Drug use: No     Comment: used marijuanna in the past    Sexual activity: Never     Partners: Male   Other Topics Concern    Parent/sibling w/ CABG, MI or angioplasty before 65F 55M? No   Social History Narrative    ** Merged History Encounter **          Social Determinants of Health     Financial Resource Strain: Not on file   Food Insecurity: Not on file   Transportation Needs: Not on file   Physical Activity: Not on file   Stress: Not on file   Social Connections: Not on file   Interpersonal Safety: Not on file   Housing Stability: Not on file       Family History   Problem Relation Age of Onset    Cerebrovascular Disease Mother     Heart Disease Mother     Heart Disease Father     Heart Disease Brother     Diabetes No family hx of     Coronary Artery Disease No family hx of     Hypertension No family hx of     Hyperlipidemia No family hx of     Breast Cancer No family hx of     Colon Cancer No family hx of     Prostate Cancer No family hx of     Other  Cancer No family hx of     Depression No family hx of     Anxiety Disorder No family hx of     Mental Illness No family hx of     Substance Abuse No family hx of     Anesthesia Reaction No family hx of     Asthma No family hx of     Osteoporosis No family hx of     Genetic Disorder No family hx of     Thyroid Disease No family hx of     Obesity No family hx of     Unknown/Adopted No family hx of        ROS    Allergies   Allergen Reactions    Propofol Nausea and Vomiting    Shellfish Allergy      Other reaction(s): Dizziness    Capsaicin Rash and Blisters       Current Outpatient Medications   Medication    amoxicillin-clavulanate (AUGMENTIN) 875-125 MG tablet    blood glucose (ACCU-CHEK FASTCLIX) lancing device    blood glucose (CONTOUR TEST) test strip    busPIRone (BUSPAR) 7.5 MG tablet    diazepam (VALIUM) 2 MG tablet    DULoxetine (CYMBALTA) 60 MG capsule    glipiZIDE (GLUCOTROL) 5 MG tablet    hydrOXYzine (ATARAX) 25 MG tablet    lamoTRIgine (LAMICTAL) 100 MG tablet    lisinopril (ZESTRIL) 5 MG tablet    melatonin 3 MG tablet    metFORMIN (GLUCOPHAGE) 500 MG tablet    methocarbamol (ROBAXIN) 500 MG tablet    miconazole (MICATIN) 2 % external powder    omeprazole (PRILOSEC) 20 MG DR capsule    ondansetron (ZOFRAN ODT) 4 MG ODT tab    order for DME    oxyCODONE (ROXICODONE) 5 MG tablet    predniSONE (DELTASONE) 1 MG tablet    predniSONE (DELTASONE) 5 MG tablet    pregabalin (LYRICA) 50 MG capsule    solifenacin (VESICARE) 5 MG tablet    triamterene-HCTZ (DYAZIDE) 37.5-25 MG capsule    vitamin D3 (CHOLECALCIFEROL) 50 mcg (2000 units) tablet    acetaminophen (TYLENOL) 325 MG tablet    calamine 8-8 % lotion    clobetasol (TEMOVATE) 0.05 % external cream    CONTOUR NEXT TEST test strip    cyanocobalamin 1000 MCG SUBL    JARDIANCE 25 MG TABS tablet    lactulose (CHRONULAC) 10 GM/15ML solution    naloxone (NARCAN) 4 MG/0.1ML nasal spray    nystatin (MYCOSTATIN) 359585 UNIT/ML suspension    polyethylene glycol  "(MIRALAX) 17 GM/Dose powder    senna-docusate (SENOKOT-S/PERICOLACE) 8.6-50 MG tablet    triamcinolone (KENALOG) 0.1 % external cream     No current facility-administered medications for this visit.       /76   Pulse 90   Ht 1.651 m (5' 5\")   LMP  (LMP Unknown)   BMI 31.62 kg/m   No LMP recorded (lmp unknown). Patient is postmenopausal. Body mass index is 31.62 kg/m .  Ms.. Yang is alert, comfortable in no acute distress, non-labored breathing.   Abdomen is soft, non-tender, non-distended, no CVAT.    Atrophic female genitalia. The urethra was normal.    She has good support on supine strain.    SST - Pos  Void -  PVR -     A/P: Jumana Yang is a 83 year old F with mixed urinary incontinence    Stop oxybutynin. Start Vesicare 5mg   Will schedule urethral bulking    I spent a total of 45 minutes with  Ms. Yang  on the date of the encounter in chart review, face to face patient visit, review of tests, documentation and/or discussion with other providers about the issues documented above.    Jorge Foreman MD  Professor, OB/GYN  Urogynecologist  CC  Patient Care Team:  Ananya Mclean NP as PCP - General (Nurse Practitioner - Adult Health)  Herminio Goodman MBBS as Assigned Rheumatology Provider  Abbi Caro AuD as Audiologist (Audiology)  Ananya LOPEZ as   Ibeth Foster MD as Anesthesiologist (Pain Medicine)  Ibeth Foster MD as Anesthesiologist (Pain Medicine)  Ananya Mclean NP (Nurse Practitioner - Adult Health)  Caitlin Osullivan PA-C as Assigned Heart and Vascular Provider  Nissen, Rick L, MD as Assigned Surgical Provider  [facility]  Davina Hatfield AuD as Audiologist (Audiology)  Jorge Foreman MD as MD (OB/Gyn)  SELF, REFERRED      "

## 2023-12-19 ENCOUNTER — HOSPITAL ENCOUNTER (OUTPATIENT)
Dept: WOUND CARE | Facility: CLINIC | Age: 83
Discharge: HOME | End: 2023-12-19
Attending: FAMILY MEDICINE | Admitting: FAMILY MEDICINE
Payer: MEDICARE

## 2023-12-19 VITALS
HEART RATE: 93 BPM | DIASTOLIC BLOOD PRESSURE: 48 MMHG | TEMPERATURE: 96.7 F | RESPIRATION RATE: 22 BRPM | SYSTOLIC BLOOD PRESSURE: 138 MMHG

## 2023-12-19 DIAGNOSIS — L89.623 STAGE III PRESSURE ULCER OF LEFT HEEL (H): Primary | ICD-10-CM

## 2023-12-19 DIAGNOSIS — I89.0 LYMPHEDEMA: ICD-10-CM

## 2023-12-19 PROCEDURE — 97602 WOUND(S) CARE NON-SELECTIVE: CPT

## 2023-12-19 PROCEDURE — 99214 OFFICE O/P EST MOD 30 MIN: CPT | Performed by: FAMILY MEDICINE

## 2023-12-19 NOTE — DISCHARGE INSTRUCTIONS
Jumana Yang      1940    A DME order for supplies has been placed to [facility], [address]. If there are any issues with your order including not receiving the order please call [facility] at [phone] option 1. They can also provide a tracking number for you if you had supplies shipped to you.  Please ship supplies to [facility] at [address] [facility] [id number]    Dressing changes outside of clinic are being performed by Patient, Home Care, and PCA      Plan:  -Try to minimize placing pressure to heel - especially when you are moving around with the wheelchair   - Do not shower with dressings on; take them off before showering. Do wound care afterward (including compression)  - Please have your Nurse call us to give her information so we can send wound care instructions.     Wound Dressing Change: left heel  - Wash your hands with soap and water before you begin your dressing change and prepare a clean surface for dressings.  - Cleanse with mild unscented soap and water (such as Cetaphil, Cerave or Dove)   - Apply 1/6 Endoform Natural (cut-to-fit size of wound); moisten with saline or water prior to application  - Apply one 4x4 Zetuvit plus silicone border  - Apply thick, high-quality moisturizer to intact skin on both legs (such as CeraVe, Eucerin, Aquaphor or Vanicream)   - Apply compression stocking (OTC 15-20mmHg knee-high)  - purchase at [facility] [id number] today  Change daily     Right leg skin care, daily:  - Cleanse with mild unscented soap and water (such as Cetaphil, Cerave or Dove)   - Apply thick, high-quality moisturizer to intact skin (such as CeraVe, Eucerin, Aquaphor or Vanicream)  - Apply compression stocking (OTC 15-20mmHg knee-high)     Elevation:  It is recommended that you elevate your legs above the level of your heart for 30 minutes: 2-3 times each day   Ways to do this:   - Lay on the couch or your bed and prop your legs up on pillows.  Pillows should be under your calves in bed so your feet are floating in the air and no pressure is on the wound.   - Recline back as far as you can go in your recliner and prop your legs on pillows.   Doing these things will help reduce the edema in your legs.     Compression:   Your compression is Compression socks and can be removed at night and put back on first thing in the morning.   Please remove compression dressing if toes turn blue and/or tingle and can not be relieved by raising the leg for one hour. If unable to reapply in the morning, keep compression on until next dressing change.    Walk as much as you can, as you are able. Whenever you sit raise your ankle above your hips to promote wound healing.     Velcro Compression Wraps  and can be removed at night and put back on first thing in the morning.   Please remove compression dressing if toes turn blue and/or tingle and can not be relieved by raising the leg for one hour. If unable to reapply in the morning, keep compression on until next dressing change.     Walk as much as you can, as you are able.      Protein:  A diet high in protein is important for wound healing, we recommend getting up to 90 grams of protein per day.   Taking protein shakes or bars are a good way to get extra protein in your diet.      Blood Sugars:  Keep blood sugar below 150 or A1C below 7.0          Woo Lew M.D. December 19, 2023    Call us at [phone] if you have any questions about your wounds, have redness or swelling around your wound, have a fever of 101 degrees Fahrenheit or greater or if you have any other problems or concerns. We answer the phone Monday through Friday 8 am to 4 pm, please leave a message as we check the voicemail frequently throughout the day.     If you had a positive experience please indicate that on your patient satisfaction survey form that  [employer] [facility] will be sending you.    It was a pleasure meeting with you today.  Thank  you for allowing me and my team the privilege of caring for you today.  YOU are the reason we are here, and I truly hope we provided you with the excellent service you deserve.  Please let us know if there is anything else we can do for you so that we can be sure you are leaving completely satisfied with your care experience.      If you have any billing related questions please call the [facility] Business office at [phone]. The clinic staff does not handle billing related matters.    If you are scheduled to have a follow up appointment, you will receive a reminder call the day before your visit. On the appointment day please arrive 15 minutes prior to your appointment time. If you are unable to keep that appointment, please call the clinic to cancel or reschedule. If you are more than 10 minutes late or greater for your scheduled appointment time, the clinic policy is that you may be asked to reschedule.

## 2023-12-19 NOTE — PROGRESS NOTES
Wound Clinic Note         Visit date: 12/19/2023       Norbert Complaint:     Jumana Yang is a 83 year old   female had concerns including WOUND CARE.  The patient has lower extremity edema and a left heel wound.         HISTORY OF PRESENT ILLNESS:    Jumana Yang reports the wound has been present since early August.  The wound began without a clear cause.   Prior to this she has never had other difficult to heal wounds on the lower extremities.    Since her last clinic visit with me she developed an infection at her left heel wound and was seen at urgent care.  She was prescribed antibiotics which she continues to take now with no symptoms of an adverse reaction.    She reports she has tried using her Velcro compression garment but does not like them and prefers to use her compression stocking.  She reports she has been using her compression stockings every day except she washed them just earlier today so she does not wear her compression stockings into the wound clinic.    The left heel wound has been bandaged with endoform and a Mepilex bandage changed 3 times a week by the home health nurses.        The pateint denies fevers or chills.  They report the pain from the wound has been 3/10 and has remained about the same recently.      The patient reports they currently do not have any routine for elevating their legs.  The patient reports they only occasionally lay down to elevate her legs above the level of their heart, but not twice a day. She does sleep flat laying on a couch.    Today the patient reports maintaining a high protein diet, but has not been taking protein supplements lately.        The patient denies a history of smoking or chronic steroid use.  and The patient confirms having diabetes and reports the blood sugars are above 200 at least once a day.  and The patient is working with a provider who manages the diabetes to better control the blood sugars.         The patient has not had any  symptoms of infection relating to the wound recently and is not currently on antibiotics.       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       Surgical Hx:   Past Surgical History:   Procedure Laterality Date    CATARACT IOL, RT/LT Left 7/2013    FOOT SURGERY      toe    GI SURGERY      IR LUMBAR/SACRAL TRANSFOR INJ BILATERAL Bilateral 3/11/2022    LAPAROSCOPIC HEPATECTOMY PARTIAL  11/4/2013    Procedure: LAPAROSCOPIC HEPATECTOMY PARTIAL;  Laparoscopic Debridement of Liver Abcess;  Surgeon: Sepideh Green MD;  Location: [facility] OR    ORTHOPEDIC SURGERY      PICC INSERTION  8/28/2013    5fr DL Power PICC, 41cm (1cm external length) in the R lateral brachial vein with tip in the SVC RA junction.    RECTAL SURGERY      1970s    RELEASE CARPAL TUNNEL Left 3/21/2023    Procedure: LEFT OPEN CARPAL TUNNEL RELEASE. LEFT RING FINGER A1 PULLEY RELEASE;  Surgeon: Claire Hamilton MD;  Location: [facility] OR    VASCULAR SURGERY            Allergies:    Allergies   Allergen Reactions    Propofol Nausea and Vomiting    Shellfish Allergy      Other reaction(s): Dizziness    Capsaicin Rash and  Blisters    Tegaderm Transparent Dressing (Informational Only) Itching and Rash              Medication History:    Current Outpatient Medications   Medication Sig    acetaminophen (TYLENOL) 325 MG tablet Take 2 tablets (650 mg) by mouth every 4 hours as needed for other (For optimal non-opioid multimodal pain management to improve pain control.) (Patient not taking: Reported on 7/17/2023)    amoxicillin-clavulanate (AUGMENTIN) 875-125 MG tablet Take 1 tablet by mouth 2 times daily for 14 days    blood glucose (ACCU-CHEK FASTCLIX) lancing device FOR TESTING ONCE DAILY. DX  E11.9 TYPE 2 DIABETES    blood glucose (CONTOUR TEST) test strip TESTING EVERY DAY DX  E11.9    busPIRone (BUSPAR) 7.5 MG tablet Take 7.5 mg by mouth 3 times daily    calamine 8-8 % lotion Apply topically every hour as needed for itching (Patient not taking: Reported on 7/17/2023)    clobetasol (TEMOVATE) 0.05 % external cream Apply topically 2 times daily (Patient not taking: Reported on 12/9/2023)    CONTOUR NEXT TEST test strip TESTING EVERY DAY DX  E11.9    cyanocobalamin 1000 MCG SUBL Place 1,000 mcg under the tongue daily (Patient not taking: Reported on 12/9/2023)    diazepam (VALIUM) 2 MG tablet Take 1 tablet (2 mg) by mouth every 6 hours as needed for anxiety    DULoxetine (CYMBALTA) 60 MG capsule Take 60 mg by mouth every morning    glipiZIDE (GLUCOTROL) 5 MG tablet Take 1 tablet (5 mg) by mouth 2 times daily (before meals)    hydrOXYzine (ATARAX) 25 MG tablet Take 25 mg by mouth 3 times daily as needed for anxiety    JARDIANCE 25 MG TABS tablet Take 25 mg by mouth every morning (Patient not taking: Reported on 12/9/2023)    lactulose (CHRONULAC) 10 GM/15ML solution Take 15 mLs (10 g) by mouth every 8 hours as needed for constipation (Patient not taking: Reported on 7/17/2023)    lamoTRIgine (LAMICTAL) 100 MG tablet Take 100 mg by mouth 2 times daily    lisinopril (ZESTRIL) 5 MG tablet Take 5 mg by mouth daily    melatonin 3 MG tablet  Take 1 tablet (3 mg) by mouth nightly as needed for sleep    metFORMIN (GLUCOPHAGE) 500 MG tablet Take 500 mg by mouth 2 times daily (with meals)    methocarbamol (ROBAXIN) 500 MG tablet Take 1 tablet (500 mg) by mouth 3 times daily as needed for muscle spasms    miconazole (MICATIN) 2 % external powder Apply topically 2 times daily as needed To the skin fold    naloxone (NARCAN) 4 MG/0.1ML nasal spray Spray 1 spray (4 mg) into one nostril alternating nostrils as needed for opioid reversal every 2-3 minutes until assistance arrives (Patient not taking: Reported on 12/9/2023)    nystatin (MYCOSTATIN) 049985 UNIT/ML suspension Take 5 mLs (500,000 Units) by mouth 4 times daily x7 days through 4/23 (Patient not taking: Reported on 12/9/2023)    omeprazole (PRILOSEC) 20 MG DR capsule Take 20 mg by mouth daily    ondansetron (ZOFRAN ODT) 4 MG ODT tab Take 1 tablet (4 mg) by mouth every 6 hours as needed for nausea or vomiting    order for DME Equipment being ordered: wrist brace    oxyCODONE (ROXICODONE) 5 MG tablet Take 1-2 tablets (5-10 mg) by mouth every 6 hours as needed for moderate pain 1 if pain rated 1-5 & 2 if 6-10.    polyethylene glycol (MIRALAX) 17 GM/Dose powder Take 17 g by mouth 2 times daily as needed for constipation (Patient not taking: Reported on 12/9/2023)    predniSONE (DELTASONE) 1 MG tablet Take 1 mg by mouth every morning (In addition to the 5 mg dose; 1 mg + 5 mg = 6 mg)    predniSONE (DELTASONE) 5 MG tablet Take 5 mg by mouth every morning (In addition to the 1 mg dose; 5 mg + 1 mg = 6 mg)    pregabalin (LYRICA) 50 MG capsule Take 1 capsule (50 mg) by mouth 2 times daily    senna-docusate (SENOKOT-S/PERICOLACE) 8.6-50 MG tablet Take 1 tablet by mouth 2 times daily as needed for constipation (Patient not taking: Reported on 12/9/2023)    solifenacin (VESICARE) 5 MG tablet Take 1 tablet (5 mg) by mouth daily    triamcinolone (KENALOG) 0.1 % external cream Apply topically 2 times daily as needed  (Patient not taking: Reported on 12/9/2023)    triamterene-HCTZ (DYAZIDE) 37.5-25 MG capsule Take 1 capsule by mouth every morning Hold for SBP<110    vitamin D3 (CHOLECALCIFEROL) 50 mcg (2000 units) tablet Take 50 mcg by mouth daily     No current facility-administered medications for this encounter.         Tobacco History:  reports that she quit smoking about 36 years ago. Her smoking use included cigarettes. She has quit using smokeless tobacco.       REVIEW OF SYMPTOMS:   The review of systems was negative except as noted in the HPI.           PHYSICAL EXAMINATION:     /48 (BP Location: Right arm, Patient Position: Chair, Cuff Size: Adult Regular)   Pulse 93   Temp (!) 96.7  F (35.9  C) (Temporal)   Resp 22   LMP  (LMP Unknown)            GENERAL: The patient overall appears well and is no acute distress.   HEAD: normocephalic   EYES: Sclera and conjunctiva clear   NECK: no obvious masses   LUNGS: breathing is unlabored.   EXTREMITIES: No clubbing, cyanosis or edema   SKIN: No rashes or other abnormalities except as noted under the Wound section below.   NEUROLOGICAL: normal motor and sensory function   EDEMA: Moderate  and Lymphedema       WOUND: The wound appears healthy with no sign of infection.   Wound bed: granulation tissue  Periwound: healthy intact skin  There are no residual signs of infection of the left heel wound.  The left heel wound is about the same size compared with her last clinic visit.      Also see below for wound details:       Circumferential volume measures:             No data to display                Ulceration(s)/Wound(s):   Please see the media tab under the chart review for pictures of the wounds.  Nursing staff removed dressings and cleansed wound.    Wound (used by OP [facility] only) 09/27/23 1326 Left heel pressure injury (Active)   Thickness/Stage Stage 3 12/19/23 1316   Base pink;moist 12/19/23 1316   Periwound intact 12/19/23 1316   Periwound Temperature warm 12/19/23  1316   Periwound Skin Turgor soft 12/19/23 1316   Edges open 12/19/23 1316   Length (cm) 1.2 12/19/23 1316   Width (cm) 1.1 12/19/23 1316   Depth (cm) 0.1 12/19/23 1316   Wound (cm^2) 1.32 cm^2 12/19/23 1316   Wound Volume (cm^3) 0.13 cm^3 12/19/23 1316   Wound healing % 53.68 12/19/23 1316   Drainage Characteristics/Odor serosanguineous 12/19/23 1316   Drainage Amount moderate 12/19/23 1316   Care, Wound non-select wound debridement performed. 12/19/23 1316                   Recent Labs   Lab Test 03/21/23  1025 03/08/22  0626 07/20/21  0614   A1C 6.9* 8.8* 10.8*          Recent Labs   Lab Test 05/22/23  1316 07/03/22  2204 06/16/22  2302   ALBUMIN 3.5 3.8 3.6              No sharp debridement performed today.                  ASSESSMENT:   This is a 83 year old  female with a stage III left heel pressure injury and a right leg wound, the patient also has lower extremity edema which was also managed during today's clinic visit.          PLAN:   We will bandage the left heel wound with endoform and a Mepilex bandage and her compression stockings change 3 times a week.     I have encouraged her to continue to keep pressure off of the heel area as much as she can during the day and the night.  On October 9, 2023 she had an ankle-brachial index checked which did not show any evidence of arterial insufficiency.      Separate from the wound care instructions we then discussed management strategies for lower extremity edema.  I explained the keys for managing lower extremity edema are compression and elevation.  I have again explained to the patient today that controlling the edema is probably the most important thing we can do to help heal the wound.  I have specifically recommended that they lay down with their legs above the level of the heart for 30 minutes at least twice a day.  I emphasized that if we can not control the edema we will likely not be able to get this wound to heal.     I have encouraged the patient to  continue on their high protein diet to aid in wound healing.   The patient will return to the wound clinic in 4 weeks to see me again.        30 minutes spent on the date of the encounter doing chart review, history and exam, documentation and further activities per the note, this time excludes any procedure time      Woo Lew MD  12/19/2023   1:56 PM   [facility]ound  [phone]    This note was electronically signed by oWo Lew MD        Further instructions from your care team         Jumana Yang      1940    A DME order for supplies has been placed to [facility], [address]. If there are any issues with your order including not receiving the order please call [facility] at [phone] option 1. They can also provide a tracking number for you if you had supplies shipped to you.  Please ship supplies to [facility] at [address] [facility] [address]    Dressing changes outside of clinic are being performed by Patient, Home Care, and PCA      Plan:  -Try to minimize placing pressure to heel - especially when you are moving around with the wheelchair   - Do not shower with dressings on; take them off before showering. Do wound care afterward (including compression)  - Please have your Nurse call us to give her information so we can send wound care instructions.     Wound Dressing Change: left heel  - Wash your hands with soap and water before you begin your dressing change and prepare a clean surface for dressings.  - Cleanse with mild unscented soap and water (such as Cetaphil, Cerave or Dove)   - Apply 1/6 Endoform Natural (cut-to-fit size of wound); moisten with saline or water prior to application  - Apply one 4x4 Zetuvit plus silicone border  - Apply thick, high-quality moisturizer to intact skin on both legs (such as CeraVe, Eucerin, Aquaphor or Vanicream)   - Apply compression stocking (OTC 15-20mmHg knee-high)  - purchase at  [facility] [id number] today  Change daily     Right leg skin care, daily:  - Cleanse with mild unscented soap and water (such as Cetaphil, Cerave or Dove)   - Apply thick, high-quality moisturizer to intact skin (such as CeraVe, Eucerin, Aquaphor or Vanicream)  - Apply compression stocking (OTC 15-20mmHg knee-high)     Elevation:  It is recommended that you elevate your legs above the level of your heart for 30 minutes: 2-3 times each day   Ways to do this:   - Lay on the couch or your bed and prop your legs up on pillows. Pillows should be under your calves in bed so your feet are floating in the air and no pressure is on the wound.   - Recline back as far as you can go in your recliner and prop your legs on pillows.   Doing these things will help reduce the edema in your legs.     Compression:   Your compression is Compression socks and can be removed at night and put back on first thing in the morning.   Please remove compression dressing if toes turn blue and/or tingle and can not be relieved by raising the leg for one hour. If unable to reapply in the morning, keep compression on until next dressing change.    Walk as much as you can, as you are able. Whenever you sit raise your ankle above your hips to promote wound healing.     Velcro Compression Wraps  and can be removed at night and put back on first thing in the morning.   Please remove compression dressing if toes turn blue and/or tingle and can not be relieved by raising the leg for one hour. If unable to reapply in the morning, keep compression on until next dressing change.     Walk as much as you can, as you are able.      Protein:  A diet high in protein is important for wound healing, we recommend getting up to 90 grams of protein per day.   Taking protein shakes or bars are a good way to get extra protein in your diet.      Blood Sugars:  Keep blood sugar below 150 or A1C below 7.0          Woo Lew M.D. December 19, 2023    Call us  at [phone] if you have any questions about your wounds, have redness or swelling around your wound, have a fever of 101 degrees Fahrenheit or greater or if you have any other problems or concerns. We answer the phone Monday through Friday 8 am to 4 pm, please leave a message as we check the voicemail frequently throughout the day.     If you had a positive experience please indicate that on your patient satisfaction survey form that [facility] will be sending you.    It was a pleasure meeting with you today.  Thank you for allowing me and my team the privilege of caring for you today.  YOU are the reason we are here, and I truly hope we provided you with the excellent service you deserve.  Please let us know if there is anything else we can do for you so that we can be sure you are leaving completely satisfied with your care experience.      If you have any billing related questions please call the [facility] Business office at [phone]. The clinic staff does not handle billing related matters.    If you are scheduled to have a follow up appointment, you will receive a reminder call the day before your visit. On the appointment day please arrive 15 minutes prior to your appointment time. If you are unable to keep that appointment, please call the clinic to cancel or reschedule. If you are more than 10 minutes late or greater for your scheduled appointment time, the clinic policy is that you may be asked to reschedule.         ,

## 2023-12-28 ENCOUNTER — TELEPHONE (OUTPATIENT)
Dept: UROLOGY | Facility: CLINIC | Age: 83
End: 2023-12-28
Payer: MEDICARE

## 2023-12-28 DIAGNOSIS — N39.41 URGE INCONTINENCE: ICD-10-CM

## 2023-12-28 NOTE — TELEPHONE ENCOUNTER
[facility] Call Center    Phone Message    May a detailed message be left on voicemail: yes     Reason for Call: Medication Question or concern regarding medication   Prescription Clarification  Name of Medication: solifenacin (VESICARE) 5 MG tablet and oxybutynin ER (DITROPAN XL) 24 hr tablet 15 mg   Prescribing Provider: Jorge Foreman MD   Pharmacy:   [facility] #[id number] - [place], [place] - [address]      What on the order needs clarification?   Per [facility] nurse pt would like to discontinue solifenacin (VESICARE) 5 MG tablet  and start taking oxybutynin ER (DITROPAN XL) 24 hr tablet 15 mg again  Will need refills sent to restart    Action Taken: Message routed to:  Other: [facility]\    Travel Screening: Not Applicable

## 2023-12-29 RX ORDER — SOLIFENACIN SUCCINATE 5 MG/1
5 TABLET, FILM COATED ORAL DAILY
Qty: 90 TABLET | Refills: 3 | Status: ON HOLD | OUTPATIENT
Start: 2023-12-29 | End: 2024-02-06

## 2024-01-09 ENCOUNTER — HOSPITAL ENCOUNTER (OUTPATIENT)
Dept: WOUND CARE | Facility: CLINIC | Age: 84
Discharge: HOME | End: 2024-01-09
Attending: FAMILY MEDICINE | Admitting: FAMILY MEDICINE
Payer: MEDICARE

## 2024-01-09 VITALS — DIASTOLIC BLOOD PRESSURE: 64 MMHG | TEMPERATURE: 96.3 F | SYSTOLIC BLOOD PRESSURE: 156 MMHG | HEART RATE: 81 BPM

## 2024-01-09 DIAGNOSIS — S81.801D WOUND OF RIGHT LOWER EXTREMITY, SUBSEQUENT ENCOUNTER: Primary | ICD-10-CM

## 2024-01-09 DIAGNOSIS — L89.623 STAGE III PRESSURE ULCER OF LEFT HEEL (H): ICD-10-CM

## 2024-01-09 PROCEDURE — 97602 WOUND(S) CARE NON-SELECTIVE: CPT

## 2024-01-09 PROCEDURE — 99214 OFFICE O/P EST MOD 30 MIN: CPT | Performed by: FAMILY MEDICINE

## 2024-01-09 NOTE — DISCHARGE INSTRUCTIONS
Jumana WINTERS Trey      1940    Will check on whether we order supplies or whether home care will order supplies:  A DME order for supplies has been placed to [facility], [address]. If there are any issues with your order including not receiving the order please call [facility] at [phone] option 1. They can also provide a tracking number for you if you had supplies shipped to you.  Please ship supplies to [facility] at [address] [facility], [place]. [address] [facility] [address]     [facility] Phone [phone]; Fax [phone]   CHERELLE Osullivan    Dressing changes outside of clinic are being performed by Home Care (2x per week) and PCA (other days)     Plan:  - Avoid pressure to heel - especially when you are moving around with the wheelchair   - Do not shower with dressings on; take them off before showering. Do wound care afterward (including compression)  - Keep blood sugars below 150 and A1C below 7   - See dermatologist for fissures on fingers    Daily skin care to hand fissures:  - Do not use antibacterial soap   - Avoid   - Apply thick, high quality moisturizer twice daily until you're seen by dermatologist    (i.e. CeraVe, Eucerin, Aquaphor or Vanicream)      Wound Dressing Change: left heel  - Wash your hands with soap and water before you begin your dressing change and prepare a clean surface for dressings.  - Cleanse with mild unscented soap and water (such as Cetaphil, Cerave or Dove)   - Apply thin layer zinc oxide barrier cream to protect skin around wound  - Apply 1/15 Endoform Natural (cut-to-fit size of wound); moisten with saline or water prior to application  - Apply one 4x4 Zetuvit plus silicone border  - Apply thick, high-quality moisturizer to intact skin on both legs (such as CeraVe, Eucerin, Aquaphor or Vanicream)   - Apply compression stocking (OTC 15-20mmHg knee-high) and/or velcro wraps  Change daily     Right leg skin care, daily:  - Cleanse with mild  unscented soap and water (such as Cetaphil, Cerave or Dove)   - Apply thick, high-quality moisturizer to intact skin (such as CeraVe, Eucerin, Aquaphor or Vanicream)  - Apply compression stocking (OTC 15-20mmHg knee-high)     Elevate: your legs above the level of your heart for 30 minutes: 2-3 times each day   Ways to do this:   - Lay on the couch or your bed and prop your legs up on pillows. Pillows should be under your calves in bed so your feet are floating in the air and no pressure is on the wound.   - Recline back as far as you can go in your recliner and prop your legs on pillows.      Compression:   Compression stockings/velcro wraps can be removed at night and put back on first thing in the morning.   Please remove compression dressing if toes turn blue and/or tingle and can not be relieved by raising the leg for one hour. If unable to reapply in the morning, keep compression on until next dressing change.      Walk as much as you can, as you are able.      Low sodium, high protein diet:  A diet high in protein is important for wound healing, we recommend getting up to 90 grams of protein per day.   Taking protein shakes or bars are a good way to get extra protein in your diet.        Main Provider: Woo Lew M.D. January 9, 2024    Call us at [phone] if you have any questions about your wounds, have redness or swelling around your wound, have a fever of 101 degrees Fahrenheit or greater or if you have any other problems or concerns. We answer the phone Monday through Friday 8 am to 4 pm, please leave a message as we check the voicemail frequently throughout the day.     If you had a positive experience please indicate that on your patient satisfaction survey form that [facility] will be sending you.    It was a pleasure meeting with you today.  Thank you for allowing me and my team the privilege of caring for you today.  YOU are the reason we are here, and I truly hope we provided you  with the excellent service you deserve.  Please let us know if there is anything else we can do for you so that we can be sure you are leaving completely satisfied with your care experience.      If you have any billing related questions please call the [facility] Business office at [phone]. The clinic staff does not handle billing related matters.    If you are scheduled to have a follow up appointment, you will receive a reminder call the day before your visit. On the appointment day please arrive 15 minutes prior to your appointment time. If you are unable to keep that appointment, please call the clinic to cancel or reschedule. If you are more than 10 minutes late or greater for your scheduled appointment time, the clinic policy is that you may be asked to reschedule.

## 2024-01-09 NOTE — PROGRESS NOTES
Wound Clinic Note         Visit date: 01/09/2024       Cheif Complaint:     Jumana Yang is a 83 year old   female had concerns including WOUND CARE.  The patient has lower extremity edema and a left heel wound.         HISTORY OF PRESENT ILLNESS:    Jumana Yang reports the wound has been present since early August.  The wound began without a clear cause.   Prior to this she has never had other difficult to heal wounds on the lower extremities.    She confirms that she completed taking the antibiotics prescribed by the urgent care clinic with no symptoms of an adverse reaction.    The left heel wound has been bandaged with endoform and a Mepilex bandage and her compression stocking changed 3 times a week by the home health nurses.  There is been light serous drainage from the wound.  There is been no difficulties with the dressing changes.        The pateint denies fevers or chills.  They report the pain from the wound has been 3/10 and has remained about the same recently.      The patient reports they only occasionally lay down to elevate her legs above the level of their heart, but not twice a day. She does sleep flat laying on a couch.    Today the patient reports maintaining a high protein diet, but has not been taking protein supplements lately.        The patient denies a history of smoking or chronic steroid use.  The patient confirms having diabetes and reports the blood sugars are well controlled.         The patient has not had any symptoms of infection relating to the wound recently and is not currently on antibiotics.       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       Surgical Hx:   Past Surgical History:   Procedure Laterality Date    CATARACT IOL, RT/LT Left 7/2013    FOOT SURGERY      toe    GI SURGERY      IR LUMBAR/SACRAL TRANSFOR INJ BILATERAL Bilateral 3/11/2022    LAPAROSCOPIC HEPATECTOMY PARTIAL  11/4/2013    Procedure: LAPAROSCOPIC HEPATECTOMY PARTIAL;  Laparoscopic Debridement of Liver Abcess;  Surgeon: Sepideh Green MD;  Location: [facility] OR    ORTHOPEDIC SURGERY      PICC INSERTION  8/28/2013    5fr DL Power PICC, 41cm (1cm external length) in the R lateral brachial vein with tip in the SVC RA junction.    RECTAL SURGERY      1970s    RELEASE CARPAL TUNNEL Left 3/21/2023    Procedure: LEFT OPEN CARPAL TUNNEL RELEASE. LEFT RING FINGER A1 PULLEY RELEASE;  Surgeon: Claire Hamilton MD;  Location: [facility] OR    VASCULAR SURGERY            Allergies:    Allergies   Allergen Reactions    Propofol Nausea and Vomiting    Shellfish Allergy      Other reaction(s): Dizziness    Capsaicin Rash and Blisters    Tegaderm Transparent Dressing (Informational Only) Itching and Rash              Medication History:    Current Outpatient Medications   Medication Sig    acetaminophen (TYLENOL) 325 MG tablet Take 2 tablets (650 mg) by mouth every 4 hours as needed for other (For optimal non-opioid multimodal pain management to improve pain control.) (Patient not taking: Reported on 7/17/2023)    blood glucose (ACCU-CHEK FASTCLIX) lancing device FOR TESTING ONCE DAILY. DX  E11.9  TYPE 2 DIABETES    blood glucose (CONTOUR TEST) test strip TESTING EVERY DAY DX  E11.9    busPIRone (BUSPAR) 7.5 MG tablet Take 7.5 mg by mouth 3 times daily    calamine 8-8 % lotion Apply topically every hour as needed for itching (Patient not taking: Reported on 7/17/2023)    clobetasol (TEMOVATE) 0.05 % external cream Apply topically 2 times daily (Patient not taking: Reported on 12/9/2023)    CONTOUR NEXT TEST test strip TESTING EVERY DAY DX  E11.9    cyanocobalamin 1000 MCG SUBL Place 1,000 mcg under the tongue daily (Patient not taking: Reported on 12/9/2023)    diazepam (VALIUM) 2 MG tablet Take 1 tablet (2 mg) by mouth every 6 hours as needed for anxiety    DULoxetine (CYMBALTA) 60 MG capsule Take 60 mg by mouth every morning    glipiZIDE (GLUCOTROL) 5 MG tablet Take 1 tablet (5 mg) by mouth 2 times daily (before meals)    hydrOXYzine (ATARAX) 25 MG tablet Take 25 mg by mouth 3 times daily as needed for anxiety    JARDIANCE 25 MG TABS tablet Take 25 mg by mouth every morning (Patient not taking: Reported on 12/9/2023)    lactulose (CHRONULAC) 10 GM/15ML solution Take 15 mLs (10 g) by mouth every 8 hours as needed for constipation (Patient not taking: Reported on 7/17/2023)    lamoTRIgine (LAMICTAL) 100 MG tablet Take 100 mg by mouth 2 times daily    lisinopril (ZESTRIL) 5 MG tablet Take 5 mg by mouth daily    melatonin 3 MG tablet Take 1 tablet (3 mg) by mouth nightly as needed for sleep    metFORMIN (GLUCOPHAGE) 500 MG tablet Take 500 mg by mouth 2 times daily (with meals)    methocarbamol (ROBAXIN) 500 MG tablet Take 1 tablet (500 mg) by mouth 3 times daily as needed for muscle spasms    miconazole (MICATIN) 2 % external powder Apply topically 2 times daily as needed To the skin fold    naloxone (NARCAN) 4 MG/0.1ML nasal spray Spray 1 spray (4 mg) into one nostril alternating nostrils as needed for opioid reversal every 2-3 minutes until assistance arrives (Patient not taking: Reported on 12/9/2023)     nystatin (MYCOSTATIN) 675141 UNIT/ML suspension Take 5 mLs (500,000 Units) by mouth 4 times daily x7 days through 4/23 (Patient not taking: Reported on 12/9/2023)    omeprazole (PRILOSEC) 20 MG DR capsule Take 20 mg by mouth daily    ondansetron (ZOFRAN ODT) 4 MG ODT tab Take 1 tablet (4 mg) by mouth every 6 hours as needed for nausea or vomiting    order for DME Equipment being ordered: wrist brace    oxyCODONE (ROXICODONE) 5 MG tablet Take 1-2 tablets (5-10 mg) by mouth every 6 hours as needed for moderate pain 1 if pain rated 1-5 & 2 if 6-10.    polyethylene glycol (MIRALAX) 17 GM/Dose powder Take 17 g by mouth 2 times daily as needed for constipation (Patient not taking: Reported on 12/9/2023)    predniSONE (DELTASONE) 1 MG tablet Take 1 mg by mouth every morning (In addition to the 5 mg dose; 1 mg + 5 mg = 6 mg)    predniSONE (DELTASONE) 5 MG tablet Take 5 mg by mouth every morning (In addition to the 1 mg dose; 5 mg + 1 mg = 6 mg)    pregabalin (LYRICA) 50 MG capsule Take 1 capsule (50 mg) by mouth 2 times daily    senna-docusate (SENOKOT-S/PERICOLACE) 8.6-50 MG tablet Take 1 tablet by mouth 2 times daily as needed for constipation (Patient not taking: Reported on 12/9/2023)    solifenacin (VESICARE) 5 MG tablet Take 1 tablet (5 mg) by mouth daily    triamcinolone (KENALOG) 0.1 % external cream Apply topically 2 times daily as needed (Patient not taking: Reported on 12/9/2023)    triamterene-HCTZ (DYAZIDE) 37.5-25 MG capsule Take 1 capsule by mouth every morning Hold for SBP<110    vitamin D3 (CHOLECALCIFEROL) 50 mcg (2000 units) tablet Take 50 mcg by mouth daily     No current facility-administered medications for this encounter.         Tobacco History:  reports that she quit smoking about 36 years ago. Her smoking use included cigarettes. She has quit using smokeless tobacco.       REVIEW OF SYMPTOMS:   The review of systems was negative except as noted in the HPI.           PHYSICAL EXAMINATION:     BP (!)  156/64 (BP Location: Left arm, Patient Position: Fowlers)   Pulse 81   Temp (!) 96.3  F (35.7  C) (Temporal)   LMP  (LMP Unknown)            GENERAL: The patient overall appears well and is no acute distress.   HEAD: normocephalic   EYES: Sclera and conjunctiva clear   NECK: no obvious masses   LUNGS: breathing is unlabored.   EXTREMITIES: No clubbing, cyanosis or edema   SKIN: No rashes or other abnormalities except as noted under the Wound section below.   NEUROLOGICAL: normal motor and sensory function   EDEMA: Moderate  and Lymphedema       WOUND: The wound appears healthy with no sign of infection.   Wound bed: granulation tissue  Periwound: healthy intact skin  There is just a pinpoint open area remaining.      Also see below for wound details:       Circumferential volume measures:             No data to display                Ulceration(s)/Wound(s):   Please see the media tab under the chart review for pictures of the wounds.  Nursing staff removed dressings and cleansed wound.    Wound (used by OP [facility] only) 09/27/23 1326 Left heel pressure injury (Active)   Thickness/Stage Stage 3 01/09/24 1200   Base pink;moist 01/09/24 1200   Periwound macerated 01/09/24 1200   Periwound Temperature warm 01/09/24 1200   Periwound Skin Turgor soft 01/09/24 1200   Edges open 01/09/24 1200   Length (cm) 0.3 01/09/24 1200   Width (cm) 0.2 01/09/24 1200   Depth (cm) 0.2 01/09/24 1200   Wound (cm^2) 0.06 cm^2 01/09/24 1200   Wound Volume (cm^3) 0.01 cm^3 01/09/24 1200   Wound healing % 97.89 01/09/24 1200   Drainage Characteristics/Odor serosanguineous;malodorous 01/09/24 1200   Drainage Amount moderate 01/09/24 1200   Care, Wound non-select wound debridement performed. 01/09/24 1200                     Recent Labs   Lab Test 03/21/23  1025 03/08/22  0626 07/20/21  0614   A1C 6.9* 8.8* 10.8*          Recent Labs   Lab Test 05/22/23  1316 07/03/22  2204 06/16/22  2302   ALBUMIN 3.5 3.8 3.6              No sharp debridement  performed today.                  ASSESSMENT:   This is a 83 year old  female with a stage III left heel pressure injury and a right leg wound, the patient also has lower extremity edema which was also managed during today's clinic visit.          PLAN:   We will bandage the left heel wound with endoform and a Mepilex bandage and her compression stockings change 3 times a week.     I have encouraged her to continue to keep pressure off of the heel area as much as she can during the day and the night.  On October 9, 2023 she had an ankle-brachial index checked which did not show any evidence of arterial insufficiency.      Separate from the wound care instructions we then discussed management strategies for lower extremity edema.  I explained the keys for managing lower extremity edema are compression and elevation.  I have again explained to the patient today that controlling the edema is probably the most important thing we can do to help heal the wound.  I have specifically recommended that they lay down with their legs above the level of the heart for 30 minutes at least twice a day.  I emphasized that if we can not control the edema we will likely not be able to get this wound to heal.     I have encouraged the patient to continue on their high protein diet to aid in wound healing.   The patient will return to the wound clinic in 4 weeks to see me again.        30 minutes spent on the date of the encounter doing chart review, history and exam, documentation and further activities per the note, this time excludes any procedure time      Woo Lew MD  01/09/2024   2:40 PM   [facility]/Wound  [phone]    This note was electronically signed by Woo Lew MD        Further instructions from your care team         Jumana Yang      1940    Will check on whether we order supplies or whether home care will order supplies:  A DME order for supplies has been placed to  [facility], [address]. If there are any issues with your order including not receiving the order please call [facility] at [phone] option 1. They can also provide a tracking number for you if you had supplies shipped to you.  Please ship supplies to [facility] at [address] [facility] [address] [facility] [address]     [facility] Phone [phone]; Fax [phone]   CHERELLE Osullivan    Dressing changes outside of clinic are being performed by Home Care (2x per week) and PCA (other days)     Plan:  - Avoid pressure to heel - especially when you are moving around with the wheelchair   - Do not shower with dressings on; take them off before showering. Do wound care afterward (including compression)  - Keep blood sugars below 150 and A1C below 7   - See dermatologist for fissures on fingers    Daily skin care to hand fissures:  - Do not use antibacterial soap   - Avoid   - Apply thick, high quality moisturizer twice daily until you're seen by dermatologist    (i.e. CeraVe, Eucerin, Aquaphor or Vanicream)      Wound Dressing Change: left heel  - Wash your hands with soap and water before you begin your dressing change and prepare a clean surface for dressings.  - Cleanse with mild unscented soap and water (such as Cetaphil, Cerave or Dove)   - Apply thin layer zinc oxide barrier cream to protect skin around wound  - Apply 1/15 Endoform Natural (cut-to-fit size of wound); moisten with saline or water prior to application  - Apply one 4x4 Zetuvit plus silicone border  - Apply thick, high-quality moisturizer to intact skin on both legs (such as CeraVe, Eucerin, Aquaphor or Vanicream)   - Apply compression stocking (OTC 15-20mmHg knee-high) and/or velcro wraps  Change daily     Right leg skin care, daily:  - Cleanse with mild unscented soap and water (such as Cetaphil, Cerave or Dove)   - Apply thick, high-quality moisturizer to intact skin (such as CeraVe, Eucerin, Aquaphor or  Vanicream)  - Apply compression stocking (OTC 15-20mmHg knee-high)     Elevate: your legs above the level of your heart for 30 minutes: 2-3 times each day   Ways to do this:   - Lay on the couch or your bed and prop your legs up on pillows. Pillows should be under your calves in bed so your feet are floating in the air and no pressure is on the wound.   - Recline back as far as you can go in your recliner and prop your legs on pillows.      Compression:   Compression stockings/velcro wraps can be removed at night and put back on first thing in the morning.   Please remove compression dressing if toes turn blue and/or tingle and can not be relieved by raising the leg for one hour. If unable to reapply in the morning, keep compression on until next dressing change.      Walk as much as you can, as you are able.      Low sodium, high protein diet:  A diet high in protein is important for wound healing, we recommend getting up to 90 grams of protein per day.   Taking protein shakes or bars are a good way to get extra protein in your diet.        Main Provider: Woo Lew M.D. January 9, 2024    Call us at [phone] if you have any questions about your wounds, have redness or swelling around your wound, have a fever of 101 degrees Fahrenheit or greater or if you have any other problems or concerns. We answer the phone Monday through Friday 8 am to 4 pm, please leave a message as we check the voicemail frequently throughout the day.     If you had a positive experience please indicate that on your patient satisfaction survey form that [facility] will be sending you.    It was a pleasure meeting with you today.  Thank you for allowing me and my team the privilege of caring for you today.  YOU are the reason we are here, and I truly hope we provided you with the excellent service you deserve.  Please let us know if there is anything else we can do for you so that we can be sure you are leaving completely  satisfied with your care experience.      If you have any billing related questions please call the [facility] Business office at [phone]. The clinic staff does not handle billing related matters.    If you are scheduled to have a follow up appointment, you will receive a reminder call the day before your visit. On the appointment day please arrive 15 minutes prior to your appointment time. If you are unable to keep that appointment, please call the clinic to cancel or reschedule. If you are more than 10 minutes late or greater for your scheduled appointment time, the clinic policy is that you may be asked to reschedule.        ,

## 2024-01-09 NOTE — ADDENDUM NOTE
Encounter addended by: Enid Saenz RN on: 1/9/2024 3:52 PM   Actions taken: Order list changed, Diagnosis association updated

## 2024-01-15 ENCOUNTER — TELEPHONE (OUTPATIENT)
Dept: UROLOGY | Facility: CLINIC | Age: 84
End: 2024-01-15
Payer: MEDICARE

## 2024-01-15 NOTE — TELEPHONE ENCOUNTER
[facility] Call Center    Phone Message    May a detailed message be left on voicemail: yes     Reason for Call: Other: Patient would like to see a female provider- she also thought she was supposed to be getting a call to discuss recent labs, writer does not see what this is about.Please call BRYNN del rio back to discuss.       Action Taken: Message routed to:  Other: uro    Travel Screening: Not Applicable

## 2024-01-16 NOTE — TELEPHONE ENCOUNTER
Spoke with BRYNN Jean Baptiste.  He states that Tonya is looking for results of a urine test that was done when she came in to see Dr Foreman in December.  I do not see any tests ordered or resulted.  Markel was unclear on what testing was done-said that Tonya went into bathroom with roomer, but was not sure what the doctor was looking for.    Also asking whether urethral bulking could be scheduled with female provider.  Routed to Dr Foreman.

## 2024-01-23 ENCOUNTER — OFFICE VISIT (OUTPATIENT)
Dept: AUDIOLOGY | Facility: CLINIC | Age: 84
End: 2024-01-23
Payer: MEDICARE

## 2024-01-23 DIAGNOSIS — H90.3 BILATERAL SENSORINEURAL HEARING LOSS: Primary | ICD-10-CM

## 2024-01-23 PROCEDURE — 92592 PR HEARING AID CHECK, MONAURAL: CPT | Performed by: AUDIOLOGIST

## 2024-01-24 ENCOUNTER — APPOINTMENT (OUTPATIENT)
Dept: RADIOLOGY | Facility: CLINIC | Age: 84
End: 2024-01-24
Attending: EMERGENCY MEDICINE
Payer: MEDICARE

## 2024-01-24 PROCEDURE — 96360 HYDRATION IV INFUSION INIT: CPT

## 2024-01-24 PROCEDURE — 71045 X-RAY EXAM CHEST 1 VIEW: CPT

## 2024-01-24 PROCEDURE — 99284 EMERGENCY DEPT VISIT MOD MDM: CPT | Mod: 25

## 2024-01-24 RX ORDER — ACETAMINOPHEN 325 MG/1
650 TABLET ORAL ONCE
Status: COMPLETED | OUTPATIENT
Start: 2024-01-25 | End: 2024-01-25

## 2024-01-24 NOTE — PROGRESS NOTES
"AUDIOLOGY REPORT  SUBJECTIVE:   Tonya Yang is an 83 year old female who was seen in the Audiology Clinic at the [facility] for a hearing aid check. Previous results have revealed a moderate to severe presumably sensorineural hearing loss at .25 to .5 kHz with no response at .75 to 8 kHz with 0% word recognition score in the right ear and a moderate to severe sensorineural hearing loss with 16% word recognition score in the left ear. Online [employer] services were used for today's appointment. She was accompanied by her PCA. Mara reported that her hearing aid charging cord is not consistent and she has to work very hard to get it plugged in properly. Due to the inconsistent charging her hearing aid will frequently die during the day and her previous \"back up\" hearing aid does not work.  OBJECTIVE:   Throughout the appointment today, I had to remind Mara to read the captions, however she spent the majority of the appointment talking and repeating things which made it difficult to move forward with the appointment. Visual inspection of the  and charging cord showed physical damage. The hearing aid would still charge, however if it is inconsistent I recommended that we order a new  under the warranty. Patient would like to pick it up when it arrives.  The tubing on both her current and backup hearing aid was changed. The tube on her previous hearing aid was occluded with cerumen so after the tubing change the hearing aid was working properly. She asked me repeatedly to make her hearing aid \"more clear\". I attempted to explain multiple times that the hearing aid cannot provide clarity and that her hearing loss is severe and word recognition is 28% so that is the amount of clarity we can expect. I explained that a cochlear implant would be the only way to improve clarity, however she is adamant that she is not ready for this. I explained that she does not need to get " a cochlear implant, however she does need to be realistic about what to expect with the hearing. It is unclear how much of this discussion the patient understand and she would not consistently read the captioning.     ASSESSMENT:   A left hearing aid check was completed. New  will be ordered.  PLAN:   We will contact Mara to  the  when it arrives and she should return for follow-up as needed, or at least every 4-6 months for tubing change. Please call this clinic with any questions regarding today s appointment.    Sarah Hart, [facility]  Licensed Audiologist  [place] License #[id number]

## 2024-01-25 ENCOUNTER — HOSPITAL ENCOUNTER (EMERGENCY)
Facility: CLINIC | Age: 84
Discharge: HOME | End: 2024-01-25
Attending: EMERGENCY MEDICINE | Admitting: EMERGENCY MEDICINE
Payer: MEDICARE

## 2024-01-25 VITALS
SYSTOLIC BLOOD PRESSURE: 139 MMHG | BODY MASS INDEX: 31.58 KG/M2 | OXYGEN SATURATION: 91 % | HEIGHT: 64 IN | HEART RATE: 80 BPM | RESPIRATION RATE: 20 BRPM | WEIGHT: 185 LBS | DIASTOLIC BLOOD PRESSURE: 62 MMHG | TEMPERATURE: 100.3 F

## 2024-01-25 DIAGNOSIS — U07.1 COVID-19: ICD-10-CM

## 2024-01-25 LAB
ANION GAP SERPL CALCULATED.3IONS-SCNC: 10 MMOL/L (ref 7–15)
BUN SERPL-MCNC: 19.1 MG/DL (ref 8–23)
CALCIUM SERPL-MCNC: 8.4 MG/DL (ref 8.8–10.2)
CHLORIDE SERPL-SCNC: 102 MMOL/L (ref 98–107)
CREAT SERPL-MCNC: 1.27 MG/DL (ref 0.51–0.95)
DEPRECATED HCO3 PLAS-SCNC: 30 MMOL/L (ref 22–29)
EGFRCR SERPLBLD CKD-EPI 2021: 42 ML/MIN/1.73M2
ERYTHROCYTE [DISTWIDTH] IN BLOOD BY AUTOMATED COUNT: 18.6 % (ref 10–15)
FLUAV RNA SPEC QL NAA+PROBE: NEGATIVE
FLUBV RNA RESP QL NAA+PROBE: NEGATIVE
GLUCOSE SERPL-MCNC: 81 MG/DL (ref 70–99)
GROUP A STREP BY PCR: NOT DETECTED
HCT VFR BLD AUTO: 30.9 % (ref 35–47)
HGB BLD-MCNC: 8 G/DL (ref 11.7–15.7)
MCH RBC QN AUTO: 19.7 PG (ref 26.5–33)
MCHC RBC AUTO-ENTMCNC: 25.9 G/DL (ref 31.5–36.5)
MCV RBC AUTO: 76 FL (ref 78–100)
PLATELET # BLD AUTO: 236 10E3/UL (ref 150–450)
POTASSIUM SERPL-SCNC: 3.9 MMOL/L (ref 3.4–5.3)
RBC # BLD AUTO: 4.06 10E6/UL (ref 3.8–5.2)
RSV RNA SPEC NAA+PROBE: NEGATIVE
SARS-COV-2 RNA RESP QL NAA+PROBE: POSITIVE
SODIUM SERPL-SCNC: 142 MMOL/L (ref 135–145)
WBC # BLD AUTO: 12.3 10E3/UL (ref 4–11)

## 2024-01-25 PROCEDURE — 87651 STREP A DNA AMP PROBE: CPT | Performed by: EMERGENCY MEDICINE

## 2024-01-25 PROCEDURE — 36415 COLL VENOUS BLD VENIPUNCTURE: CPT | Performed by: EMERGENCY MEDICINE

## 2024-01-25 PROCEDURE — 87637 SARSCOV2&INF A&B&RSV AMP PRB: CPT | Performed by: EMERGENCY MEDICINE

## 2024-01-25 PROCEDURE — 80048 BASIC METABOLIC PNL TOTAL CA: CPT | Performed by: EMERGENCY MEDICINE

## 2024-01-25 PROCEDURE — 258N000003 HC RX IP 258 OP 636: Performed by: EMERGENCY MEDICINE

## 2024-01-25 PROCEDURE — 250N000013 HC RX MED GY IP 250 OP 250 PS 637: Performed by: EMERGENCY MEDICINE

## 2024-01-25 PROCEDURE — 85027 COMPLETE CBC AUTOMATED: CPT | Performed by: EMERGENCY MEDICINE

## 2024-01-25 RX ORDER — ONDANSETRON 4 MG/1
4 TABLET, ORALLY DISINTEGRATING ORAL EVERY 8 HOURS PRN
Qty: 10 TABLET | Refills: 0 | Status: ON HOLD | OUTPATIENT
Start: 2024-01-25 | End: 2024-02-06

## 2024-01-25 RX ADMIN — ACETAMINOPHEN 650 MG: 325 TABLET ORAL at 00:19

## 2024-01-25 RX ADMIN — SODIUM CHLORIDE 1000 ML: 9 INJECTION, SOLUTION INTRAVENOUS at 00:19

## 2024-01-25 NOTE — ED TRIAGE NOTES
Patient presents to the ED, brought in by son and PCA, she states she has a lot of phlegm she cannot get up.  She states there is a lot of other complaints but that is what she is here for today.     Triage Assessment (Adult)       Row Name 01/24/24 0483          Triage Assessment    Airway WDL WDL        Respiratory WDL    Respiratory WDL X;cough     Cough Type congested        Skin Circulation/Temperature WDL    Skin Circulation/Temperature WDL WDL        Cardiac WDL    Cardiac WDL WDL        Peripheral/Neurovascular WDL    Peripheral Neurovascular WDL WDL        Cognitive/Neuro/Behavioral WDL    Cognitive/Neuro/Behavioral WDL WDL

## 2024-01-25 NOTE — ED PROVIDER NOTES
"EMERGENCY DEPARTMENT ENCOUNTER      NAME: Jumana Yang  AGE: 83 year old female  YOB: 1940  MRN: [id number]  EVALUATION DATE & TIME: No admission date for patient encounter.    PCP: Ananya Mclean    ED PROVIDER: Suleiman Rodriguez M.D.      Chief Complaint   Patient presents with    Nasal Congestion         FINAL IMPRESSION:  COVID      ED COURSE & MEDICAL DECISION MAKING:    Pertinent Labs & Imaging studies reviewed. (See chart for details)  83 year old female presents to the Emergency Department for evaluation of congestion, fevers, general malaise.  Patient arrives with a gentleman who is a presumptive PCA.  She reports she is having trouble clearing her phlegm and with this she has had increased cough and mild achiness in her upper chest.  Also reports her throat \"feels on fire\".  Patient with past medical history of diabetes mellitus, bipolar disorder, COPD.  She is seen in triage area due to ED overcrowding.  She is an elderly female mild distress.  Vital signs significant for slight temperature elevation 100.3.  Oxygenation borderline at 91%.  Lungs were clear.  Minimal dry cough noted.  Abdomen soft and nontender.  Given patient's fevers, COPD we will proceed with chest x-ray to assess for pneumonia.  Will also swab for COVID/RSV/influenza.  Patient also ports decreased oral intake due to symptoms.  Will obtain baseline blood work to assess for electrolyte imbalance, dehydration.  Normal saline initiated at 250 cc an hour. Patient appears non toxic with stable vitals signs.     11:39 PM I met with the patient for the initial interview and physical examination. Discussed plan for treatment and workup in the ED.    12:55 AM.  Patient metabolic panel shows mild insufficiency with creatinine 1.27.  This compares to 0.89 on 10/6/2023.  CBC with moderate anemia hemoglobin 8.0.  This compares to 8.7 on 10/6/2023.  Chest x-ray reviewed.  No acute infiltrates noted.  Radiology report " "agrees.  1:15 AM.  Strep screen is negative.  Awaiting viral swab  1:24 AM.  Patient is positive for COVID.  This would certainly explain her symptomatology.  Did consider Paxlovid with patient but given her limited symptomatology the risk benefit is minimal.  At the conclusion of the encounter I discussed the results of all of the tests and the disposition. The questions were answered and return precautions provided. The patient or family acknowledged understanding and was agreeable with the care plan.       PPE: Provider wore mask.    MEDICATIONS GIVEN IN THE EMERGENCY:  Medications - No data to display    NEW PRESCRIPTIONS STARTED AT TODAY'S ER VISIT  Current Discharge Medication List        START taking these medications    Details   !! ondansetron (ZOFRAN ODT) 4 MG ODT tab Take 1 tablet (4 mg) by mouth every 8 hours as needed  Qty: 10 tablet, Refills: 0       !! - Potential duplicate medications found. Please discuss with provider.              =================================================================    HPI    Patient information was obtained from: patient and friend    Use of Intrepreter: N/A         Jumana Yang is a 83 year old female with a pertient medical history of Meniere's disease, irritable bowel syndrome, CKD stage 3, hyperlipidemia, hypertension, chronic pain, and DM2 who presents to the ED for evaluation of congestion.    The patient presents with congestion that has resulted in a cough with associated \"burning\" sore throat and chest pain. She has been trying to clear the phlegm from her throat but cannot cough any of it up. For the last 2 nights she has been very cold in her bedroom at night. She has chronic diarrhea from IBS that was present today. She reports that she has a surgery scheduled a few weeks from now and wants to feel better by then. She has diabetes. She is unsure if she has been taking her PRN Hydroxyzine.     She denies vomiting or any other complaints at this time. "       REVIEW OF SYSTEMS   Constitutional:  Denies fever, chills  Respiratory:  Denies increased work of breathing. Positive for cough, sore throat, and congestion.   Cardiovascular:  Denies chest pain, palpitations  GI:  Denies abdominal pain, nausea, vomiting, or change in bowel or bladder habits   Musculoskeletal:  Denies any new muscle/joint swelling  Skin:  Denies rash   Neurologic:  Denies focal weakness  All systems negative except as marked.     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    Hepatic abscess     Hyperlipidemia     Hypertension     Hypothyroidism     Irritable bowel syndrome     Meniere disease     Neuropathy     Noninfectious ileitis     Peritoneal abscess (H)     Spinal stenosis of lumbar region     Steroid long-term use     Vaginitis, atrophic, postmenopausal     Vitamin D deficiency        PAST SURGICAL HISTORY:  Past Surgical History:   Procedure Laterality Date    CATARACT IOL, RT/LT Left 7/2013    FOOT SURGERY      toe    GI SURGERY      IR LUMBAR/SACRAL TRANSFOR INJ BILATERAL Bilateral 3/11/2022    LAPAROSCOPIC HEPATECTOMY PARTIAL  11/4/2013    Procedure: LAPAROSCOPIC HEPATECTOMY PARTIAL;  Laparoscopic Debridement of Liver Abcess;  Surgeon: Sepideh Green MD;  Location: [facility] OR    ORTHOPEDIC SURGERY      PICC INSERTION  8/28/2013    5fr DL Power PICC, 41cm (1cm external length) in the R lateral brachial vein with tip in the SVC RA junction.    RECTAL SURGERY      1970s    RELEASE CARPAL TUNNEL Left 3/21/2023    Procedure: LEFT OPEN CARPAL TUNNEL RELEASE. LEFT RING FINGER A1 PULLEY RELEASE;   Surgeon: Claire Hamilton MD;  Location: [facility] OR    VASCULAR SURGERY           CURRENT MEDICATIONS:    No current facility-administered medications for this encounter.    Current Outpatient Medications:     acetaminophen (TYLENOL) 325 MG tablet, Take 2 tablets (650 mg) by mouth every 4 hours as needed for other (For optimal non-opioid multimodal pain management to improve pain control.) (Patient not taking: Reported on 7/17/2023), Disp: 100 tablet, Rfl: 0    blood glucose (ACCU-CHEK FASTCLIX) lancing device, FOR TESTING ONCE DAILY. DX  E11.9 TYPE 2 DIABETES, Disp: , Rfl:     blood glucose (CONTOUR TEST) test strip, TESTING EVERY DAY DX  E11.9, Disp: , Rfl:     busPIRone (BUSPAR) 7.5 MG tablet, Take 7.5 mg by mouth 3 times daily, Disp: , Rfl:     calamine 8-8 % lotion, Apply topically every hour as needed for itching (Patient not taking: Reported on 7/17/2023), Disp: 118 mL, Rfl: 0    clobetasol (TEMOVATE) 0.05 % external cream, Apply topically 2 times daily (Patient not taking: Reported on 12/9/2023), Disp: , Rfl:     CONTOUR NEXT TEST test strip, TESTING EVERY DAY DX  E11.9, Disp: , Rfl:     cyanocobalamin 1000 MCG SUBL, Place 1,000 mcg under the tongue daily (Patient not taking: Reported on 12/9/2023), Disp: , Rfl:     diazepam (VALIUM) 2 MG tablet, Take 1 tablet (2 mg) by mouth every 6 hours as needed for anxiety, Disp: 10 tablet, Rfl: 0    DULoxetine (CYMBALTA) 60 MG capsule, Take 60 mg by mouth every morning, Disp: , Rfl:     glipiZIDE (GLUCOTROL) 5 MG tablet, Take 1 tablet (5 mg) by mouth 2 times daily (before meals), Disp: , Rfl:     hydrOXYzine (ATARAX) 25 MG tablet, Take 25 mg by mouth 3 times daily as needed for anxiety, Disp: , Rfl:     JARDIANCE 25 MG TABS tablet, Take 25 mg by mouth every morning (Patient not taking: Reported on 12/9/2023), Disp: , Rfl:     lactulose (CHRONULAC) 10 GM/15ML solution, Take 15 mLs (10 g) by mouth every 8 hours as needed for constipation (Patient not taking: Reported on  7/17/2023), Disp: , Rfl:     lamoTRIgine (LAMICTAL) 100 MG tablet, Take 100 mg by mouth 2 times daily, Disp: , Rfl:     lisinopril (ZESTRIL) 5 MG tablet, Take 5 mg by mouth daily, Disp: , Rfl:     melatonin 3 MG tablet, Take 1 tablet (3 mg) by mouth nightly as needed for sleep, Disp: , Rfl:     metFORMIN (GLUCOPHAGE) 500 MG tablet, Take 500 mg by mouth 2 times daily (with meals), Disp: , Rfl:     methocarbamol (ROBAXIN) 500 MG tablet, Take 1 tablet (500 mg) by mouth 3 times daily as needed for muscle spasms, Disp: 60 tablet, Rfl: 0    miconazole (MICATIN) 2 % external powder, Apply topically 2 times daily as needed To the skin fold, Disp: , Rfl:     naloxone (NARCAN) 4 MG/0.1ML nasal spray, Spray 1 spray (4 mg) into one nostril alternating nostrils as needed for opioid reversal every 2-3 minutes until assistance arrives (Patient not taking: Reported on 12/9/2023), Disp: 0.2 mL, Rfl:     nystatin (MYCOSTATIN) 706810 UNIT/ML suspension, Take 5 mLs (500,000 Units) by mouth 4 times daily x7 days through 4/23 (Patient not taking: Reported on 12/9/2023), Disp: , Rfl:     omeprazole (PRILOSEC) 20 MG DR capsule, Take 20 mg by mouth daily, Disp: , Rfl:     ondansetron (ZOFRAN ODT) 4 MG ODT tab, Take 1 tablet (4 mg) by mouth every 6 hours as needed for nausea or vomiting, Disp: , Rfl:     order for DME, Equipment being ordered: wrist brace, Disp: 1 Device, Rfl: 0    oxyCODONE (ROXICODONE) 5 MG tablet, Take 1-2 tablets (5-10 mg) by mouth every 6 hours as needed for moderate pain 1 if pain rated 1-5 & 2 if 6-10., Disp: 30 tablet, Rfl: 0    polyethylene glycol (MIRALAX) 17 GM/Dose powder, Take 17 g by mouth 2 times daily as needed for constipation (Patient not taking: Reported on 12/9/2023), Disp: 510 g, Rfl: PRN    predniSONE (DELTASONE) 1 MG tablet, Take 1 mg by mouth every morning (In addition to the 5 mg dose; 1 mg + 5 mg = 6 mg), Disp: , Rfl:     predniSONE (DELTASONE) 5 MG tablet, Take 5 mg by mouth every morning (In  addition to the 1 mg dose; 5 mg + 1 mg = 6 mg), Disp: , Rfl:     pregabalin (LYRICA) 50 MG capsule, Take 1 capsule (50 mg) by mouth 2 times daily, Disp: 30 capsule, Rfl: 0    senna-docusate (SENOKOT-S/PERICOLACE) 8.6-50 MG tablet, Take 1 tablet by mouth 2 times daily as needed for constipation (Patient not taking: Reported on 12/9/2023), Disp: , Rfl:     solifenacin (VESICARE) 5 MG tablet, Take 1 tablet (5 mg) by mouth daily, Disp: 90 tablet, Rfl: 3    triamcinolone (KENALOG) 0.1 % external cream, Apply topically 2 times daily as needed (Patient not taking: Reported on 12/9/2023), Disp: , Rfl:     triamterene-HCTZ (DYAZIDE) 37.5-25 MG capsule, Take 1 capsule by mouth every morning Hold for SBP<110, Disp: , Rfl:     vitamin D3 (CHOLECALCIFEROL) 50 mcg (2000 units) tablet, Take 50 mcg by mouth daily, Disp: , Rfl:     ALLERGIES:  Allergies   Allergen Reactions    Propofol Nausea and Vomiting    Shellfish Allergy      Other reaction(s): Dizziness    Capsaicin Rash and Blisters    Tegaderm Transparent Dressing (Informational Only) Itching and Rash       FAMILY HISTORY:  Family History   Problem Relation Age of Onset    Cerebrovascular Disease Mother     Heart Disease Mother     Heart Disease Father     Heart Disease Brother     Diabetes No family hx of     Coronary Artery Disease No family hx of     Hypertension No family hx of     Hyperlipidemia No family hx of     Breast Cancer No family hx of     Colon Cancer No family hx of     Prostate Cancer No family hx of     Other Cancer No family hx of     Depression No family hx of     Anxiety Disorder No family hx of     Mental Illness No family hx of     Substance Abuse No family hx of     Anesthesia Reaction No family hx of     Asthma No family hx of     Osteoporosis No family hx of     Genetic Disorder No family hx of     Thyroid Disease No family hx of     Obesity No family hx of     Unknown/Adopted No family hx of        SOCIAL HISTORY:   Social History     Socioeconomic  "History    Marital status:      Spouse name: None    Number of children: None    Years of education: None    Highest education level: None   Tobacco Use    Smoking status: Former     Types: Cigarettes     Quit date: 11/15/1987     Years since quittin.2    Smokeless tobacco: Former   Substance and Sexual Activity    Alcohol use: Not Currently     Alcohol/week: 0.0 standard drinks of alcohol     Comment: MALISSA -paco since     Drug use: No     Comment: used marijuanna in the past    Sexual activity: Never     Partners: Male   Other Topics Concern    Parent/sibling w/ CABG, MI or angioplasty before 65F 55M? No   Social History Narrative    ** Merged History Encounter **            VITALS:  Patient Vitals for the past 24 hrs:   BP Temp Temp src Pulse Resp SpO2 Height Weight   24 2308 -- -- -- -- -- -- -- 83.9 kg (185 lb)   24 2302 139/60 100.3  F (37.9  C) Oral 94 20 91 % 1.626 m (5' 4\") --        PHYSICAL EXAM    Constitutional:  Awake, alert, in no apparent distress  HENT:  Normocephalic, Atraumatic. Bilateral external ears normal. Oropharynx moist. Nose normal. Neck- Normal range of motion with no guarding, No midline cervical tenderness, Supple, No stridor.   Eyes:  PERRL, EOMI with no signs of entrapment, Conjunctiva normal, No discharge.   Respiratory:  Normal breath sounds, No respiratory distress, No wheezing.    Cardiovascular:  Normal heart rate, Normal rhythm, No appreciable rubs or gallops.   GI:  Soft, No tenderness, No distension, No palpable masses  Musculoskeletal:  Intact distal pulses, No edema. Good range of motion in all major joints. No tenderness to palpation or major deformities noted.  Integument:  Warm, Dry, No erythema, No rash.   Neurologic:  Alert & oriented, Normal motor function, Normal sensory function, No focal deficits noted.   Psychiatric:  Affect normal, Judgment normal, Mood normal.     LAB:  All pertinent labs reviewed and interpreted.     Results for " orders placed or performed during the hospital encounter of 01/25/24   XR Chest Port 1 View     Status: None    Narrative    EXAM: XR CHEST PORT 1 VIEW  LOCATION: [facility]  DATE: 1/25/2024    INDICATION: Cough and congestion  COMPARISON: 09/19/2022.      Impression    IMPRESSION: Negative chest.   Basic metabolic panel     Status: Abnormal   Result Value Ref Range    Sodium 142 135 - 145 mmol/L    Potassium 3.9 3.4 - 5.3 mmol/L    Chloride 102 98 - 107 mmol/L    Carbon Dioxide (CO2) 30 (H) 22 - 29 mmol/L    Anion Gap 10 7 - 15 mmol/L    Urea Nitrogen 19.1 8.0 - 23.0 mg/dL    Creatinine 1.27 (H) 0.51 - 0.95 mg/dL    GFR Estimate 42 (L) >60 mL/min/1.73m2    Calcium 8.4 (L) 8.8 - 10.2 mg/dL    Glucose 81 70 - 99 mg/dL   CBC (+ platelets, no diff)     Status: Abnormal   Result Value Ref Range    WBC Count 12.3 (H) 4.0 - 11.0 10e3/uL    RBC Count 4.06 3.80 - 5.20 10e6/uL    Hemoglobin 8.0 (L) 11.7 - 15.7 g/dL    Hematocrit 30.9 (L) 35.0 - 47.0 %    MCV 76 (L) 78 - 100 fL    MCH 19.7 (L) 26.5 - 33.0 pg    MCHC 25.9 (L) 31.5 - 36.5 g/dL    RDW 18.6 (H) 10.0 - 15.0 %    Platelet Count 236 150 - 450 10e3/uL   Symptomatic Influenza A/B, RSV, & SARS-CoV2 PCR (COVID-19) Nasopharyngeal     Status: Abnormal    Specimen: Nasopharyngeal; Swab   Result Value Ref Range    Influenza A PCR Negative Negative    Influenza B PCR Negative Negative    RSV PCR Negative Negative    SARS CoV2 PCR Positive (A) Negative    Narrative    Testing was performed using the Xpert Xpress CoV2/Flu/RSV Assay on the Cepheid GeneXpert Instrument. This test should be ordered for the detection of SARS-CoV-2, influenza, and RSV viruses in individuals who meet clinical and/or epidemiological criteria. Test performance is unknown in asymptomatic patients. This test is for in vitro diagnostic use under the FDA EUA for laboratories certified under CLIA to perform high or moderate complexity testing. This test has not been FDA cleared or  approved. A negative result does not rule out the presence of PCR inhibitors in the specimen or target RNA in concentration below the limit of detection for the assay. If only one viral target is positive but coinfection with multiple targets is suspected, the sample should be re-tested with another FDA cleared, approved, or authorized test, if coinfection would change clinical management. This test was validated by the [facility]. These laboratories are certified under the Clinical Laboratory Improvement Amendments of 1988 (CLIA-88) as qualified to perform high complexity laboratory testing.   Group A Streptococcus PCR Throat Swab     Status: Normal    Specimen: Throat; Swab   Result Value Ref Range    Group A strep by PCR Not Detected Not Detected    Narrative    The Xpert Xpress Strep A test, performed on the Serometrix  Instrument Systems, is a rapid, qualitative in vitro diagnostic test for the detection of Streptococcus pyogenes (Group A ß-hemolytic Streptococcus, Strep A) in throat swab specimens from patients with signs and symptoms of pharyngitis. The Xpert Xpress Strep A test can be used as an aid in the diagnosis of Group A Streptococcal pharyngitis. The assay is not intended to monitor treatment for Group A Streptococcus infections. The Xpert Xpress Strep A test utilizes an automated real-time polymerase chain reaction (PCR) to detect Streptococcus pyogenes DNA.             I, Leandro Saavedra, am serving as a scribe to document services personally performed by Suleiman Rodriguez MD, based on my observation and the provider's statements to me. I, Suleiman Rodriguez MD attest that Leandro Saavedra is acting in a scribe capacity, has observed my performance of the services and has documented them in accordance with my direction.    Suleiman Rodriguez M.D.  Emergency Medicine  [facility] EMERGENCY ROOM     Suleiman Rodriguez MD  01/25/24 0130

## 2024-01-25 NOTE — DISCHARGE INSTRUCTIONS
Take the Zofran if needed.  Take Tylenol every 4 hours to help with general aches.  Drink extra fluids to help thin your mucous

## 2024-01-28 ENCOUNTER — APPOINTMENT (OUTPATIENT)
Dept: CT IMAGING | Facility: CLINIC | Age: 84
DRG: 177 | End: 2024-01-28
Attending: EMERGENCY MEDICINE
Payer: MEDICARE

## 2024-01-28 ENCOUNTER — HOSPITAL ENCOUNTER (INPATIENT)
Facility: CLINIC | Age: 84
LOS: 11 days | Discharge: NURSING, REHAB OR LONG-TERM CARE | DRG: 177 | End: 2024-02-08
Attending: EMERGENCY MEDICINE | Admitting: HOSPITALIST
Payer: MEDICARE

## 2024-01-28 DIAGNOSIS — R09.02 HYPOXEMIA: ICD-10-CM

## 2024-01-28 DIAGNOSIS — U07.1 COVID-19 VIRUS INFECTION: ICD-10-CM

## 2024-01-28 DIAGNOSIS — L89.150 PRESSURE INJURY OF SACRAL REGION, UNSTAGEABLE (H): ICD-10-CM

## 2024-01-28 DIAGNOSIS — L89.620 PRESSURE INJURY OF LEFT HEEL, UNSTAGEABLE (H): ICD-10-CM

## 2024-01-28 DIAGNOSIS — L89.156 PRESSURE INJURY OF DEEP TISSUE OF SACRAL REGION: ICD-10-CM

## 2024-01-28 DIAGNOSIS — G89.4 CHRONIC PAIN SYNDROME: ICD-10-CM

## 2024-01-28 DIAGNOSIS — L89.626 PRESSURE INJURY OF DEEP TISSUE OF LEFT HEEL: Primary | ICD-10-CM

## 2024-01-28 DIAGNOSIS — R21 GROIN RASH: ICD-10-CM

## 2024-01-28 DIAGNOSIS — K64.4 EXTERNAL HEMORRHOIDS: ICD-10-CM

## 2024-01-28 LAB
ALBUMIN SERPL BCG-MCNC: 3.4 G/DL (ref 3.5–5.2)
ALBUMIN UR-MCNC: 20 MG/DL
ALP SERPL-CCNC: 149 U/L (ref 40–150)
ALT SERPL W P-5'-P-CCNC: 33 U/L (ref 0–50)
ANION GAP SERPL CALCULATED.3IONS-SCNC: 8 MMOL/L (ref 7–15)
APPEARANCE UR: CLEAR
AST SERPL W P-5'-P-CCNC: 51 U/L (ref 0–45)
BASE EXCESS BLDV CALC-SCNC: 4.7 MMOL/L (ref -3–3)
BASOPHILS # BLD AUTO: 0 10E3/UL (ref 0–0.2)
BASOPHILS NFR BLD AUTO: 0 %
BILIRUB SERPL-MCNC: 0.4 MG/DL
BILIRUB UR QL STRIP: NEGATIVE
BUN SERPL-MCNC: 19.1 MG/DL (ref 8–23)
CALCIUM SERPL-MCNC: 8.1 MG/DL (ref 8.8–10.2)
CHLORIDE SERPL-SCNC: 102 MMOL/L (ref 98–107)
COLOR UR AUTO: ABNORMAL
CREAT SERPL-MCNC: 1.09 MG/DL (ref 0.51–0.95)
DEPRECATED HCO3 PLAS-SCNC: 29 MMOL/L (ref 22–29)
EGFRCR SERPLBLD CKD-EPI 2021: 51 ML/MIN/1.73M2
EOSINOPHIL # BLD AUTO: 0 10E3/UL (ref 0–0.7)
EOSINOPHIL NFR BLD AUTO: 1 %
ERYTHROCYTE [DISTWIDTH] IN BLOOD BY AUTOMATED COUNT: 19.5 % (ref 10–15)
GLUCOSE SERPL-MCNC: 105 MG/DL (ref 70–99)
GLUCOSE UR STRIP-MCNC: >1000 MG/DL
HCO3 BLDV-SCNC: 30 MMOL/L (ref 21–28)
HCT VFR BLD AUTO: 27.8 % (ref 35–47)
HGB BLD-MCNC: 7.3 G/DL (ref 11.7–15.7)
HGB UR QL STRIP: NEGATIVE
HOLD SPECIMEN: NORMAL
HOLD SPECIMEN: NORMAL
IMM GRANULOCYTES # BLD: 0.2 10E3/UL
IMM GRANULOCYTES NFR BLD: 2 %
KETONES UR STRIP-MCNC: ABNORMAL MG/DL
LACTATE SERPL-SCNC: 1 MMOL/L (ref 0.7–2)
LEUKOCYTE ESTERASE UR QL STRIP: ABNORMAL
LYMPHOCYTES # BLD AUTO: 1.6 10E3/UL (ref 0.8–5.3)
LYMPHOCYTES NFR BLD AUTO: 21 %
MCH RBC QN AUTO: 20.1 PG (ref 26.5–33)
MCHC RBC AUTO-ENTMCNC: 26.3 G/DL (ref 31.5–36.5)
MCV RBC AUTO: 77 FL (ref 78–100)
MONOCYTES # BLD AUTO: 0.7 10E3/UL (ref 0–1.3)
MONOCYTES NFR BLD AUTO: 9 %
MUCOUS THREADS #/AREA URNS LPF: PRESENT /LPF
NEUTROPHILS # BLD AUTO: 5.1 10E3/UL (ref 1.6–8.3)
NEUTROPHILS NFR BLD AUTO: 67 %
NITRATE UR QL: NEGATIVE
NRBC # BLD AUTO: 0.1 10E3/UL
NRBC BLD AUTO-RTO: 1 /100
O2/TOTAL GAS SETTING VFR VENT: 21 %
OXYHGB MFR BLDV: 79 % (ref 70–75)
PCO2 BLDV: 49 MM HG (ref 40–50)
PH BLDV: 7.39 [PH] (ref 7.32–7.43)
PH UR STRIP: 5.5 [PH] (ref 5–7)
PLATELET # BLD AUTO: 212 10E3/UL (ref 150–450)
PO2 BLDV: 49 MM HG (ref 25–47)
POTASSIUM SERPL-SCNC: 3.9 MMOL/L (ref 3.4–5.3)
PROT SERPL-MCNC: 6.2 G/DL (ref 6.4–8.3)
RBC # BLD AUTO: 3.63 10E6/UL (ref 3.8–5.2)
RBC URINE: 6 /HPF
SAO2 % BLDV: 81.4 % (ref 70–75)
SODIUM SERPL-SCNC: 139 MMOL/L (ref 135–145)
SP GR UR STRIP: 1.02 (ref 1–1.03)
SQUAMOUS EPITHELIAL: 1 /HPF
TROPONIN T SERPL HS-MCNC: 34 NG/L
TROPONIN T SERPL HS-MCNC: 38 NG/L
TSH SERPL DL<=0.005 MIU/L-ACNC: 0.33 UIU/ML (ref 0.3–4.2)
UROBILINOGEN UR STRIP-MCNC: <2 MG/DL
WBC # BLD AUTO: 7.6 10E3/UL (ref 4–11)
WBC URINE: 4 /HPF

## 2024-01-28 PROCEDURE — 120N000001 HC R&B MED SURG/OB

## 2024-01-28 PROCEDURE — 70450 CT HEAD/BRAIN W/O DYE: CPT | Mod: MA

## 2024-01-28 PROCEDURE — 83605 ASSAY OF LACTIC ACID: CPT | Performed by: EMERGENCY MEDICINE

## 2024-01-28 PROCEDURE — 93005 ELECTROCARDIOGRAM TRACING: CPT | Performed by: EMERGENCY MEDICINE

## 2024-01-28 PROCEDURE — 85025 COMPLETE CBC W/AUTO DIFF WBC: CPT | Performed by: EMERGENCY MEDICINE

## 2024-01-28 PROCEDURE — 99223 1ST HOSP IP/OBS HIGH 75: CPT | Performed by: HOSPITALIST

## 2024-01-28 PROCEDURE — 250N000011 HC RX IP 250 OP 636: Performed by: HOSPITALIST

## 2024-01-28 PROCEDURE — 250N000012 HC RX MED GY IP 250 OP 636 PS 637: Performed by: HOSPITALIST

## 2024-01-28 PROCEDURE — 250N000013 HC RX MED GY IP 250 OP 250 PS 637: Performed by: HOSPITALIST

## 2024-01-28 PROCEDURE — 82805 BLOOD GASES W/O2 SATURATION: CPT | Performed by: EMERGENCY MEDICINE

## 2024-01-28 PROCEDURE — 250N000011 HC RX IP 250 OP 636: Performed by: EMERGENCY MEDICINE

## 2024-01-28 PROCEDURE — 36415 COLL VENOUS BLD VENIPUNCTURE: CPT | Performed by: EMERGENCY MEDICINE

## 2024-01-28 PROCEDURE — 82040 ASSAY OF SERUM ALBUMIN: CPT | Performed by: EMERGENCY MEDICINE

## 2024-01-28 PROCEDURE — 81001 URINALYSIS AUTO W/SCOPE: CPT | Performed by: EMERGENCY MEDICINE

## 2024-01-28 PROCEDURE — 84484 ASSAY OF TROPONIN QUANT: CPT | Performed by: EMERGENCY MEDICINE

## 2024-01-28 PROCEDURE — 84443 ASSAY THYROID STIM HORMONE: CPT | Performed by: EMERGENCY MEDICINE

## 2024-01-28 PROCEDURE — 99285 EMERGENCY DEPT VISIT HI MDM: CPT | Mod: 25

## 2024-01-28 PROCEDURE — 71275 CT ANGIOGRAPHY CHEST: CPT | Mod: MA

## 2024-01-28 PROCEDURE — 83036 HEMOGLOBIN GLYCOSYLATED A1C: CPT | Performed by: FAMILY MEDICINE

## 2024-01-28 RX ORDER — METFORMIN HCL 500 MG
500 TABLET, EXTENDED RELEASE 24 HR ORAL
COMMUNITY

## 2024-01-28 RX ORDER — PANTOPRAZOLE SODIUM 40 MG/1
40 TABLET, DELAYED RELEASE ORAL DAILY
Status: DISCONTINUED | OUTPATIENT
Start: 2024-01-28 | End: 2024-02-08 | Stop reason: HOSPADM

## 2024-01-28 RX ORDER — CEFTRIAXONE 1 G/1
1 INJECTION, POWDER, FOR SOLUTION INTRAMUSCULAR; INTRAVENOUS EVERY 24 HOURS
Status: DISCONTINUED | OUTPATIENT
Start: 2024-01-28 | End: 2024-02-02

## 2024-01-28 RX ORDER — PREGABALIN 100 MG/1
100 CAPSULE ORAL 2 TIMES DAILY
COMMUNITY

## 2024-01-28 RX ORDER — ENOXAPARIN SODIUM 100 MG/ML
40 INJECTION SUBCUTANEOUS EVERY 24 HOURS
Status: DISCONTINUED | OUTPATIENT
Start: 2024-01-28 | End: 2024-02-08 | Stop reason: HOSPADM

## 2024-01-28 RX ORDER — OXYCODONE AND ACETAMINOPHEN 5; 325 MG/1; MG/1
1 TABLET ORAL 4 TIMES DAILY PRN
Status: ON HOLD | COMMUNITY
End: 2024-02-06

## 2024-01-28 RX ORDER — CALCIUM CARBONATE 500 MG/1
1000 TABLET, CHEWABLE ORAL 4 TIMES DAILY PRN
Status: DISCONTINUED | OUTPATIENT
Start: 2024-01-28 | End: 2024-02-08 | Stop reason: HOSPADM

## 2024-01-28 RX ORDER — ONDANSETRON 4 MG/1
4 TABLET, ORALLY DISINTEGRATING ORAL EVERY 6 HOURS PRN
Status: DISCONTINUED | OUTPATIENT
Start: 2024-01-28 | End: 2024-02-08 | Stop reason: HOSPADM

## 2024-01-28 RX ORDER — AMOXICILLIN 250 MG
1 CAPSULE ORAL 2 TIMES DAILY PRN
Status: DISCONTINUED | OUTPATIENT
Start: 2024-01-28 | End: 2024-01-30

## 2024-01-28 RX ORDER — BUSPIRONE HYDROCHLORIDE 7.5 MG/1
7.5 TABLET ORAL 2 TIMES DAILY
Status: DISCONTINUED | OUTPATIENT
Start: 2024-01-28 | End: 2024-02-08 | Stop reason: HOSPADM

## 2024-01-28 RX ORDER — GLIPIZIDE 5 MG/1
5 TABLET ORAL
Status: DISCONTINUED | OUTPATIENT
Start: 2024-01-28 | End: 2024-02-08 | Stop reason: HOSPADM

## 2024-01-28 RX ORDER — ONDANSETRON 2 MG/ML
4 INJECTION INTRAMUSCULAR; INTRAVENOUS EVERY 6 HOURS PRN
Status: DISCONTINUED | OUTPATIENT
Start: 2024-01-28 | End: 2024-02-08 | Stop reason: HOSPADM

## 2024-01-28 RX ORDER — LAMOTRIGINE 100 MG/1
100 TABLET ORAL 2 TIMES DAILY
Status: DISCONTINUED | OUTPATIENT
Start: 2024-01-28 | End: 2024-02-08 | Stop reason: HOSPADM

## 2024-01-28 RX ORDER — OXYCODONE AND ACETAMINOPHEN 5; 325 MG/1; MG/1
1 TABLET ORAL 4 TIMES DAILY PRN
Status: DISCONTINUED | OUTPATIENT
Start: 2024-01-28 | End: 2024-02-08 | Stop reason: HOSPADM

## 2024-01-28 RX ORDER — PREDNISONE 5 MG/1
5 TABLET ORAL EVERY MORNING
Status: DISCONTINUED | OUTPATIENT
Start: 2024-01-29 | End: 2024-02-08 | Stop reason: HOSPADM

## 2024-01-28 RX ORDER — AMOXICILLIN 250 MG
2 CAPSULE ORAL 2 TIMES DAILY PRN
Status: DISCONTINUED | OUTPATIENT
Start: 2024-01-28 | End: 2024-01-30

## 2024-01-28 RX ORDER — NYSTATIN 100000/ML
500000 SUSPENSION, ORAL (FINAL DOSE FORM) ORAL 4 TIMES DAILY
Status: DISCONTINUED | OUTPATIENT
Start: 2024-01-28 | End: 2024-02-01

## 2024-01-28 RX ORDER — PREDNISONE 1 MG/1
1 TABLET ORAL EVERY MORNING
Status: DISCONTINUED | OUTPATIENT
Start: 2024-01-29 | End: 2024-02-08 | Stop reason: HOSPADM

## 2024-01-28 RX ORDER — ACETAMINOPHEN 650 MG/1
650 SUPPOSITORY RECTAL EVERY 4 HOURS PRN
Status: DISCONTINUED | OUTPATIENT
Start: 2024-01-28 | End: 2024-02-08 | Stop reason: HOSPADM

## 2024-01-28 RX ORDER — DULOXETIN HYDROCHLORIDE 60 MG/1
60 CAPSULE, DELAYED RELEASE ORAL EVERY MORNING
Status: DISCONTINUED | OUTPATIENT
Start: 2024-01-29 | End: 2024-02-03

## 2024-01-28 RX ORDER — GLIPIZIDE 10 MG/1
10 TABLET ORAL
Status: DISCONTINUED | OUTPATIENT
Start: 2024-01-29 | End: 2024-02-08 | Stop reason: HOSPADM

## 2024-01-28 RX ORDER — PREGABALIN 100 MG/1
100 CAPSULE ORAL 2 TIMES DAILY
Status: DISCONTINUED | OUTPATIENT
Start: 2024-01-28 | End: 2024-02-08 | Stop reason: HOSPADM

## 2024-01-28 RX ORDER — GLIPIZIDE 10 MG/1
10 TABLET ORAL
COMMUNITY

## 2024-01-28 RX ORDER — ACETAMINOPHEN 325 MG/1
650 TABLET ORAL EVERY 4 HOURS PRN
Status: DISCONTINUED | OUTPATIENT
Start: 2024-01-28 | End: 2024-02-08 | Stop reason: HOSPADM

## 2024-01-28 RX ORDER — LIDOCAINE 40 MG/G
CREAM TOPICAL
Status: DISCONTINUED | OUTPATIENT
Start: 2024-01-28 | End: 2024-02-08 | Stop reason: HOSPADM

## 2024-01-28 RX ORDER — TRIAMTERENE AND HYDROCHLOROTHIAZIDE 37.5; 25 MG/1; MG/1
1 CAPSULE ORAL EVERY MORNING
Status: DISCONTINUED | OUTPATIENT
Start: 2024-01-29 | End: 2024-02-08 | Stop reason: HOSPADM

## 2024-01-28 RX ORDER — GLIPIZIDE 5 MG/1
5 TABLET ORAL
COMMUNITY

## 2024-01-28 RX ORDER — HYDROXYZINE HYDROCHLORIDE 25 MG/1
25 TABLET, FILM COATED ORAL 3 TIMES DAILY PRN
Status: DISCONTINUED | OUTPATIENT
Start: 2024-01-28 | End: 2024-02-08 | Stop reason: HOSPADM

## 2024-01-28 RX ORDER — IOPAMIDOL 755 MG/ML
75 INJECTION, SOLUTION INTRAVASCULAR ONCE
Status: COMPLETED | OUTPATIENT
Start: 2024-01-28 | End: 2024-01-28

## 2024-01-28 RX ADMIN — GLIPIZIDE 5 MG: 5 TABLET ORAL at 20:52

## 2024-01-28 RX ADMIN — ENOXAPARIN SODIUM 40 MG: 100 INJECTION SUBCUTANEOUS at 17:31

## 2024-01-28 RX ADMIN — CEFTRIAXONE 1 G: 1 INJECTION, POWDER, FOR SOLUTION INTRAMUSCULAR; INTRAVENOUS at 17:32

## 2024-01-28 RX ADMIN — PREGABALIN 100 MG: 100 CAPSULE ORAL at 20:52

## 2024-01-28 RX ADMIN — OXYCODONE HYDROCHLORIDE AND ACETAMINOPHEN 1 TABLET: 5; 325 TABLET ORAL at 20:52

## 2024-01-28 RX ADMIN — DEXAMETHASONE 6 MG: 2 TABLET ORAL at 17:47

## 2024-01-28 RX ADMIN — BUSPIRONE HYDROCHLORIDE 7.5 MG: 7.5 TABLET ORAL at 20:52

## 2024-01-28 RX ADMIN — NYSTATIN 500000 UNITS: 100000 SUSPENSION ORAL at 20:52

## 2024-01-28 RX ADMIN — HYDROXYZINE HYDROCHLORIDE 25 MG: 25 TABLET, FILM COATED ORAL at 20:52

## 2024-01-28 RX ADMIN — PANTOPRAZOLE SODIUM 40 MG: 40 TABLET, DELAYED RELEASE ORAL at 20:52

## 2024-01-28 RX ADMIN — IOPAMIDOL 75 ML: 755 INJECTION, SOLUTION INTRAVENOUS at 14:35

## 2024-01-28 RX ADMIN — ONDANSETRON 4 MG: 2 INJECTION INTRAMUSCULAR; INTRAVENOUS at 18:36

## 2024-01-28 RX ADMIN — MICONAZOLE NITRATE: 2 POWDER TOPICAL at 22:37

## 2024-01-28 RX ADMIN — LAMOTRIGINE 100 MG: 100 TABLET ORAL at 20:52

## 2024-01-28 RX ADMIN — NYSTATIN 500000 UNITS: 100000 SUSPENSION ORAL at 17:32

## 2024-01-28 ASSESSMENT — ACTIVITIES OF DAILY LIVING (ADL)
DEPENDENT_IADLS:: CLEANING;LAUNDRY;SHOPPING;TRANSPORTATION
ADLS_ACUITY_SCORE: 41
ADLS_ACUITY_SCORE: 35
ADLS_ACUITY_SCORE: 35
ADLS_ACUITY_SCORE: 45
ADLS_ACUITY_SCORE: 35
ADLS_ACUITY_SCORE: 35

## 2024-01-28 NOTE — H&P
"Hospital Medicine Service History and Physical  [facility]: [facility]    Jumana Yang is a 83 year old female who has  PMHx:meniere's disease, IBS, CKD 3, HLD, HTN, chronic pain, T2DM  Chief Complaint: weakness    Hospital Course   1/28/24 - Returns   Assessment & Plan   Sars-Cov-2 infection: Hypoxia noted without respiratory failure. Given baseline prednisone, will choose dexamethasone for therapeutic. Can add additional Tx if no clinical improvement.  Suspect right upper lobe patchy opacity is viral.  Broadened antibiotics if clinically indicated.    Oral candidiasis: Nystatin swish/swallow ordered    Suspected acute cystitis: Empiric ceftriaxone. UA hasn't been collected. Do not suspect she's likely to provide a clean sample.    Microcytic anemia: Repeat CBC in the am. Denies melena. Has hemorrhoids with occasional BRBPR. Clinically monitor and transfuse if indicated.    Right pleural effusion: Trace size. Clinically monitor with hypoxia    Meningioma: Presumed diagnosis on imaging, unchanged from prior    Right heel wound: Wound care consult, antibiotics as above.    Med rec is pending       Estimated body mass index is 31.76 kg/m  as calculated from the following:    Height as of 1/24/24: 1.626 m (5' 4\").    Weight as of 1/24/24: 83.9 kg (185 lb).  DVTP: lovenox  Code Status: Prior  Disposition: Inpatient   Discharge: likely 2 days    History of Present Illness  Jumana Yang lives alone who has a PCA 3 times weekly. She was seen in the ED few days ago and diagnosed with SARS-CoV-2 infection. Since then she's had intermittent confusion and worsening urinary incontinence. she is a poor historian.  Son Davie is present to help out with history gathering.  Denies melena or tarry stools recently.  Does have a history of hemorrhoids and occasionally has bright red blood per rectum.  He clarifies her DNR/DNI status.  Denies O2 use at baseline.  Reports that her hearing aids are broken.     ED triage " note:  Patient presents to ED from home via EMS. Son called EMS. Patient diagnosed with covid a few days ago and was seen here at [facility] and discharged to home. Son called EMS because she has not been able to take care of herself due to weakness, confusion, not able to take care of herself and having incontinence. O2 is 86% on RA upon arrival to ED. Placed on 2L NC and O2 up to ~96%. EMS . Patient is very hard of hearing. Difficult to assess mental status due to hearing problem.    ED course:  In the ED, patient creatinine 1 GFR 51  VBG normal pH no CO2 retention  No leukocytosis, hemoglobin 7.3, MCV 77, no thrombocytopenia  CT head no acute findings     Results for orders placed or performed during the hospital encounter of 01/28/24   Head CT w/o contrast    Impression    IMPRESSION:  1.  No acute intracranial abnormality.  2.  Small presumed meningioma along the anterior falx, similar in appearance compared to the prior MRI.   CT Chest Pulmonary Embolism w Contrast    Impression    IMPRESSION:  1.  No pulmonary embolism.  2.  Small patchy airspace opacity within the right upper lobe with few additional smaller airspace opacities bilaterally which could be related to pneumonia or COVID.      Medications   iopamidol (ISOVUE-370) solution 75 mL (75 mLs Intravenous $Given 1/28/24 9851)     Physical exam:  Appears chronically ill  Anicteric sclera  Profoundly hard of hearing  Glossitis  Coarse breath sounds right-sided auscultation, left side clear  Regular rate and rhythm, no murmur appreciated  Suprapubic tenderness to palpation, otherwise nonacute abdomen  Right heel wound without purulence or drainage  Difficult to assess, however fairly normal affect, alert, interactive  Vital signs reviewed by me    Wt Readings from Last 4 Encounters:   01/24/24 83.9 kg (185 lb)   10/06/23 86.2 kg (190 lb)   09/20/23 86.2 kg (190 lb)   07/17/23 81.6 kg (180 lb)      reports that she quit smoking about 36 years ago. Her  smoking use included cigarettes. She has quit using smokeless tobacco. She reports that she does not currently use alcohol. She reports that she does not use drugs.  family history includes Cerebrovascular Disease in her mother; Heart Disease in her brother, father, and mother.   has a past surgical history that includes Rectal surgery; Foot surgery; picc insertion (8/28/2013); GI surgery; orthopedic surgery; vascular surgery; Laparoscopic hepatectomy partial (11/4/2013); cataract iol, rt/lt (Left, 7/2013); IR Lumbar Sacral Transfor Injection Bilateral (Bilateral, 3/11/2022); and Release carpal tunnel (Left, 3/21/2023).   Allergies   Allergen Reactions    Propofol Nausea and Vomiting    Shellfish Allergy      Other reaction(s): Dizziness    Capsaicin Rash and Blisters    Tegaderm Transparent Dressing (Informational Only) Itching and Rash       Donald Uribe MD, MPH  [facility]  [facility]   Phone: #[phone]

## 2024-01-28 NOTE — ED TRIAGE NOTES
Patient presents to ED from home via EMS. Son called EMS. Patient diagnosed with covid a few days ago and was seen here at [facility] and discharged to home. Son called EMS because she has not been able to take care of herself due to weakness, confusion, not able to take care of herself and having incontinence. O2 is 86% on RA upon arrival to ED. Placed on 2L NC and O2 up to ~96%. EMS . Patient is very hard of hearing. Difficult to assess mental status due to hearing problem.     Triage Assessment (Adult)       Row Name 01/28/24 1257          Triage Assessment    Airway WDL WDL        Respiratory WDL    Respiratory WDL X;all     Rhythm/Pattern, Respiratory depth regular;pattern regular;unlabored  hypoxia     Cough Frequency frequent     Cough Type productive        Skin Circulation/Temperature WDL    Skin Circulation/Temperature WDL WDL        Cardiac WDL    Cardiac WDL WDL        Peripheral/Neurovascular WDL    Peripheral Neurovascular WDL WDL        Cognitive/Neuro/Behavioral WDL    Cognitive/Neuro/Behavioral WDL X     Level of Consciousness intermittent confusion     Orientation disoriented to;situation        Dmitry Coma Scale    Best Eye Response 4-->(E4) spontaneous     Best Motor Response 6-->(M6) obeys commands     Best Verbal Response 4-->(V4) confused     Dmitry Coma Scale Score 14

## 2024-01-28 NOTE — ED PROVIDER NOTES
EMERGENCY DEPARTMENT NOTE     Name: Jumana Yang    Age/Sex: 83 year old female   MRN: [id number]   Evaluation Date & Time:  1/28/2024 12:48 PM    PCP:    Ananya Mclean   ED Provider: Suleiman Jain D.O.       CHIEF COMPLAINT    URI       DIAGNOSIS & DISPOSITION/MEDICAL DECISION MAKING     1. COVID-19 virus infection    2. Hypoxemia        Jumana Yang is a 83 year old female with relevant past history of chronic pain, hyperlipidemia, diabetes, COPD, hypertension, hypothyroidism, asthma, and hard of hearing who presents to the emergency department for evaluation of URI.    Patient's son reported that his mother has not been taking good care of herself after being positive for COVID for a couple of days and she has been getting more confused lately. This morning when he went to check up on his mother, she was soiled in bed because she was too weak to get to the bathroom.     Differential  diagnosis considered included but not limited to pulmonary embolism, myocarditis or pericarditis, ACS sepsis from multiple sources including pneumonia or urinary tract infection, electrolyte derangement, endocrine process including hypothyroidism, ischemic or hemorrhagic CVA    Medical Decision Making  Patient on exam is alert and oriented x 4 without acute encephalopathy.  Lungs are clear to auscultation bilaterally.  Cardiac exam without murmur, abdomen nontender  EKG nonischemic, initial troponin 30 8 repeat pending, hemoglobin 7.3 hypochromic microcytic near baseline 8 and reports no symptoms of acute GI bleeding.  Comprehensive metabolic profile within normal limits except for AST 51.  WBC 7.6, lactic acid 1, VBG pH 7.39 with pCO2 49.  CT of the head without acute process.  CTA chest without evidence of pulmonary embolism.  Does have interstitial infiltrates in the right upper lobe and scattered in the right lower lobe for which could be consistent with COVID-19 pneumonitis.    Patient on 2 L nasal cannula has  been able to maintain oxygen saturations 95% and has been without progressive respiratory distress.  Case was discussed with the hospitalist service.  Will admit for further treatment of COVID-19 with associated hypoxemia which is probable cause for intermittent confusion and generalized weakness.      Interventions: Nasal cannula oxygen  Discharge Vital Signs:/58   Pulse 74   Temp 98.9  F (37.2  C) (Oral)   Resp 25   LMP  (LMP Unknown)   SpO2 94%      DISPOSITION: Admit to [facility]    Diagnostic studies:  Imaging:  CT Chest Pulmonary Embolism w Contrast   Final Result   IMPRESSION:   1.  No pulmonary embolism.   2.  Small patchy airspace opacity within the right upper lobe with few additional smaller airspace opacities bilaterally which could be related to pneumonia or COVID.      Head CT w/o contrast   Final Result   IMPRESSION:   1.  No acute intracranial abnormality.   2.  Small presumed meningioma along the anterior falx, similar in appearance compared to the prior MRI.         Lab:  Labs Ordered and Resulted from Time of ED Arrival to Time of ED Departure   COMPREHENSIVE METABOLIC PANEL - Abnormal       Result Value    Sodium 139      Potassium 3.9      Carbon Dioxide (CO2) 29      Anion Gap 8      Urea Nitrogen 19.1      Creatinine 1.09 (*)     GFR Estimate 51 (*)     Calcium 8.1 (*)     Chloride 102      Glucose 105 (*)     Alkaline Phosphatase 149      AST 51 (*)     ALT 33      Protein Total 6.2 (*)     Albumin 3.4 (*)     Bilirubin Total 0.4     TROPONIN T, HIGH SENSITIVITY - Abnormal    Troponin T, High Sensitivity 38 (*)    BLOOD GAS VENOUS - Abnormal    pH Venous 7.39      pCO2 Venous 49      pO2 Venous 49 (*)     Bicarbonate Venous 30 (*)     Base Excess/Deficit Venous 4.7 (*)     FIO2 21      Oxyhemoglobin Venous 79 (*)     O2 Sat, Venous 81.4 (*)    CBC WITH PLATELETS AND DIFFERENTIAL - Abnormal    WBC Count 7.6      RBC Count 3.63 (*)     Hemoglobin 7.3 (*)     Hematocrit 27.8 (*)      MCV 77 (*)     MCH 20.1 (*)     MCHC 26.3 (*)     RDW 19.5 (*)     Platelet Count 212      % Neutrophils 67      % Lymphocytes 21      % Monocytes 9      % Eosinophils 1      % Basophils 0      % Immature Granulocytes 2      NRBCs per 100 WBC 1 (*)     Absolute Neutrophils 5.1      Absolute Lymphocytes 1.6      Absolute Monocytes 0.7      Absolute Eosinophils 0.0      Absolute Basophils 0.0      Absolute Immature Granulocytes 0.2      Absolute NRBCs 0.1     LACTIC ACID WHOLE BLOOD - Normal    Lactic Acid 1.0     TSH WITH FREE T4 REFLEX - Normal    TSH 0.33     ROUTINE UA WITH MICROSCOPIC REFLEX TO CULTURE          ED Course as of 01/28/24 1839   Sun Jan 28, 2024   1542 Hemoglobin(!): 7.3       Triage note reviewed:Patient presents to ED from home via EMS. Son called EMS. Patient diagnosed with covid a few days ago and was seen here at [facility] and discharged to home. Son called EMS because she has not been able to take care of herself due to weakness, confusion, not able to take care of herself and having incontinence. O2 is 86% on RA upon arrival to ED. Placed on 2L NC and O2 up to ~96%. EMS . Patient is very hard of hearing. Difficult to assess mental status due to hearing problem.     Triage Assessment (Adult)       Row Name 01/28/24 1258          Triage Assessment    Airway WDL WDL        Respiratory WDL    Respiratory WDL X;all     Rhythm/Pattern, Respiratory depth regular;pattern regular;unlabored  hypoxia     Cough Frequency frequent     Cough Type productive        Skin Circulation/Temperature WDL    Skin Circulation/Temperature WDL WDL        Cardiac WDL    Cardiac WDL WDL        Peripheral/Neurovascular WDL    Peripheral Neurovascular WDL WDL        Cognitive/Neuro/Behavioral WDL    Cognitive/Neuro/Behavioral WDL X     Level of Consciousness intermittent confusion     Orientation disoriented to;situation        Dmitry Coma Scale    Best Eye Response 4-->(E4) spontaneous     Best Motor Response  6-->(M6) obeys commands     Best Verbal Response 4-->(V4) confused     Dmitry Coma Scale Score 14                         History:  Supplemental history from: Family Member/Significant Other  External Record(s) reviewed: ED visit January 24, 2024, wound care visit January 9, 2024    Work Up:  Chart documentation includes differential considered and any EKGs or imaging independently interpreted by provider, where specified.  In additional to work up documented, I considered the following work up: Documented in chart, if applicable.    External consultation:  Discussion of management with another provider: Hospitalist    Complicating factors:  Care impacted by chronic illness: Chronic Pain, Diabetes, Hyperlipidemia, Hypertension, and Other: COPD, asthma, hypothyroidism, and hard of hearing.  Care affected by social determinants of health: N/A    Disposition considerations: Admit.    At the conclusion of the encounter I discussed the results of all of the tests and the disposition. The questions were answered. The patient or family acknowledged understanding and was agreeable with the care plan.    TOTAL CRITICAL CARE TIME (EXCLUDING PROCEDURES): Not applicable    PROCEDURES:   None    EMERGENCY DEPARTMENT COURSE   1:39 PM I met with the patient to gather history and to perform my initial exam.  We discussed treatment options and the plan for care while in the Emergency Department.  3:42 PM Spoke with hospitalist Dr. Uribe who accepts patient for admission.    ED INTERVENTIONS     Medications   iopamidol (ISOVUE-370) solution 75 mL (75 mLs Intravenous $Given 1/28/24 3090)       DISCHARGE MEDICATIONS        Review of your medicines        UNREVIEWED medicines. Ask your doctor about these medicines        Dose / Directions   acetaminophen 325 MG tablet  Commonly known as: TYLENOL  Used for: Carpal tunnel syndrome of left wrist      Dose: 650 mg  Take 2 tablets (650 mg) by mouth every 4 hours as needed for other (For  optimal non-opioid multimodal pain management to improve pain control.)  Quantity: 100 tablet  Refills: 0     busPIRone 7.5 MG tablet  Commonly known as: BUSPAR      Dose: 7.5 mg  Take 7.5 mg by mouth 3 times daily  Refills: 0     clobetasol 0.05 % external cream  Commonly known as: TEMOVATE      Apply topically 2 times daily  Refills: 0     cyanocobalamin 1000 MCG sublingual tablet  Commonly known as: VITAMIN B-12      Dose: 1,000 mcg  Place 1,000 mcg under the tongue daily  Refills: 0     diazepam 2 MG tablet  Commonly known as: VALIUM  Used for: Carpal tunnel syndrome of left wrist      Dose: 2 mg  Take 1 tablet (2 mg) by mouth every 6 hours as needed for anxiety  Quantity: 10 tablet  Refills: 0     DULoxetine 60 MG capsule  Commonly known as: CYMBALTA      Dose: 60 mg  Take 60 mg by mouth every morning  Refills: 0     glipiZIDE 5 MG tablet  Commonly known as: GLUCOTROL  Used for: Type 2 diabetes mellitus with diabetic nephropathy, without long-term current use of insulin (H)      Dose: 5 mg  Take 1 tablet (5 mg) by mouth 2 times daily (before meals)  Refills: 0     hydrOXYzine HCl 25 MG tablet  Commonly known as: ATARAX      Dose: 25 mg  Take 25 mg by mouth 3 times daily as needed for anxiety  Refills: 0     Jardiance 25 MG Tabs tablet  Generic drug: empagliflozin      Dose: 25 mg  Take 25 mg by mouth every morning  Refills: 0     lactulose 10 GM/15ML solution  Commonly known as: CHRONULAC  Used for: Irritable bowel syndrome with constipation      Dose: 10 g  Take 15 mLs (10 g) by mouth every 8 hours as needed for constipation  Refills: 0     lamoTRIgine 100 MG tablet  Commonly known as: LaMICtal      Dose: 100 mg  Take 100 mg by mouth 2 times daily  Refills: 0     lisinopril 5 MG tablet  Commonly known as: ZESTRIL      Dose: 5 mg  Take 5 mg by mouth daily  Refills: 0     metFORMIN 500 MG tablet  Commonly known as: GLUCOPHAGE      Dose: 500 mg  Take 500 mg by mouth 2 times daily (with meals)  Refills: 0      methocarbamol 500 MG tablet  Commonly known as: ROBAXIN  Used for: Spinal stenosis, unspecified spinal region      Dose: 500 mg  Take 1 tablet (500 mg) by mouth 3 times daily as needed for muscle spasms  Quantity: 60 tablet  Refills: 0     miconazole 2 % external powder  Commonly known as: MICATIN      Apply topically 2 times daily as needed To the skin fold  Refills: 0     naloxone 4 MG/0.1ML nasal spray  Commonly known as: NARCAN      Dose: 4 mg  Spray 1 spray (4 mg) into one nostril alternating nostrils as needed for opioid reversal every 2-3 minutes until assistance arrives  Quantity: 0.2 mL  Refills: 0     nystatin [id number] UNIT/ML suspension  Commonly known as: MYCOSTATIN  Used for: Thrush      Dose: 500,000 Units  Take 5 mLs (500,000 Units) by mouth 4 times daily x7 days through 4/23  Refills: 0     omeprazole 20 MG DR capsule  Commonly known as: PriLOSEC      Dose: 20 mg  Take 20 mg by mouth daily  Refills: 0     * ondansetron 4 MG ODT tab  Commonly known as: ZOFRAN ODT  Used for: Drug-induced constipation, Irritable bowel syndrome with constipation      Dose: 4 mg  Take 1 tablet (4 mg) by mouth every 6 hours as needed for nausea or vomiting  Refills: 0     * ondansetron 4 MG ODT tab  Commonly known as: ZOFRAN ODT      Dose: 4 mg  Take 1 tablet (4 mg) by mouth every 8 hours as needed  Quantity: 10 tablet  Refills: 0     oxyCODONE 5 MG tablet  Commonly known as: ROXICODONE  Used for: Carpal tunnel syndrome of left wrist      Dose: 5-10 mg  Take 1-2 tablets (5-10 mg) by mouth every 6 hours as needed for moderate pain 1 if pain rated 1-5 & 2 if 6-10.  Quantity: 30 tablet  Refills: 0     polyethylene glycol 17 GM/Dose powder  Commonly known as: MIRALAX  Used for: Irritable bowel syndrome with constipation      Dose: 17 g  Take 17 g by mouth 2 times daily as needed for constipation  Quantity: 510 g  Refills: PRN     * predniSONE 1 MG tablet  Commonly known as: DELTASONE      Dose: 1 mg  Take 1 mg by mouth every  morning (In addition to the 5 mg dose; 1 mg + 5 mg = 6 mg)  Refills: 0     * predniSONE 5 MG tablet  Commonly known as: DELTASONE      Dose: 5 mg  Take 5 mg by mouth every morning (In addition to the 1 mg dose; 5 mg + 1 mg = 6 mg)  Refills: 0     pregabalin 50 MG capsule  Commonly known as: LYRICA  Used for: Chronic pain syndrome-issue of broken controlled sub agreement       Dose: 50 mg  Take 1 capsule (50 mg) by mouth 2 times daily  Quantity: 30 capsule  Refills: 0     senna-docusate 8.6-50 MG tablet  Commonly known as: SENOKOT-S/PERICOLACE      Dose: 1 tablet  Take 1 tablet by mouth 2 times daily as needed for constipation  Refills: 0     solifenacin 5 MG tablet  Commonly known as: VESICARE  Used for: Urge incontinence      Dose: 5 mg  Take 1 tablet (5 mg) by mouth daily  Quantity: 90 tablet  Refills: 3     triamcinolone 0.1 % external cream  Commonly known as: KENALOG      Apply topically 2 times daily as needed  Refills: 0     triamterene-HCTZ 37.5-25 MG capsule  Commonly known as: DYAZIDE      Dose: 1 capsule  Take 1 capsule by mouth every morning Hold for SBP<110  Refills: 0     vitamin D3 50 mcg (2000 units) tablet  Commonly known as: CHOLECALCIFEROL      Dose: 50 mcg  Take 50 mcg by mouth daily  Refills: 0           * This list has 4 medication(s) that are the same as other medications prescribed for you. Read the directions carefully, and ask your doctor or other care provider to review them with you.                CONTINUE these medicines which have NOT CHANGED        Dose / Directions   blood glucose lancing device      FOR TESTING ONCE DAILY. DX  E11.9 TYPE 2 DIABETES  Refills: 0     calamine 8-8 % lotion  Used for: Spinal stenosis, unspecified spinal region      Apply topically every hour as needed for itching  Quantity: 118 mL  Refills: 0     * Contour Test test strip  Generic drug: blood glucose      TESTING EVERY DAY DX  E11.9  Refills: 0     * Contour Next Test test strip  Generic drug: blood  glucose      TESTING EVERY DAY DX  E11.9  Refills: 0     melatonin 3 MG tablet      Dose: 3 mg  Take 1 tablet (3 mg) by mouth nightly as needed for sleep  Refills: 0     order for DME  Used for: Tenosynovitis, de Quervain      Equipment being ordered: wrist brace  Quantity: 1 Device  Refills: 0           * This list has 2 medication(s) that are the same as other medications prescribed for you. Read the directions carefully, and ask your doctor or other care provider to review them with you.                    INFORMATION SOURCE AND LIMITATIONS    History/Exam limitations: Limited to hard of hearing.   Patient information was obtained from: Patient's son and Nurse  Use of : N/A    HISTORY OF PRESENT ILLNESS   Jumana Yang is a 83 year old year old female with a relevant past history of hypertension, hyperlipidemia, COPD, hypothyroidism, diabetes, asthma, chronic pain and hard of hearing who presents to this ED by walk in for evaluation of a URI.    Per nurse reports the patient has had COVID for a couple of days and has not been taking care of herself. She is currently at 86 room air and her blood sugar was at 125. She has a hard time hearing.     Per son reports this morning his mother was not responding to his phone calls so he went to check up on his mother to only find her in bed and it was all soiled because she was too weak to get herself to the bathroom. He also adds on that she has been more confused lately.     REVIEW OF SYSTEMS:   All other systems reviewed and are negative except as noted above in HPI.    PATIENT HISTORY     Past Medical History:   Diagnosis Date    Abdominal pain     Anxiety     Arthritis     Asthma     Bipolar 1 disorder (H)     Chronic pain     Cochlear hydrops 01/01/1988    COPD (chronic obstructive pulmonary disease) (H)     Depression     Diabetes mellitus (H)     Dyspepsia     GERD (gastroesophageal reflux disease)     Hard of hearing     Hepatic abscess      Hyperlipidemia     Hypertension     Hypothyroidism     Irritable bowel syndrome     Meniere disease     Neuropathy     Noninfectious ileitis     Peritoneal abscess (H)     Spinal stenosis of lumbar region     Steroid long-term use     Vaginitis, atrophic, postmenopausal     Vitamin D deficiency      Patient Active Problem List   Diagnosis    Postmenopausal atrophic vaginitis    IBS (irritable bowel syndrome)    Meniere's disease (cochlear hydrops) - on prednisone and triamterene hydrochlorothiazide    GERD (gastroesophageal reflux disease)    CKD (chronic kidney disease) stage 3, GFR 30-59 ml/min (H)    Health Care Home    Iron deficiency anemia due to chronic blood loss    Deafness    Abscess,& FB in liver in 4-10 & 4-11 w recurrent pain in RUQ  in 7-13 s/po lap I&D and unroofing sans finding of an FB  in 11-13     Elevated liver enzymes since 12-13 liver abscess I&D    Temporomandibular joint disorder    Bipolar disorder, in full remission, most recent episode depressed (H24)    Diarrhea r/o exacerbated by metformin -since 30's    Baker's cyst, left    Lower urinary tract infectious disease    Left-sided low back pain with left-sided sciatica since 1993 w reoccurrence 6-15     Meniere's disease (cochlear hydrops), LT    Steroid dependent for cochlear hydrops  since 1985     Bilateral back pain    Primary insomnia    Mixed hyperlipidemia    Localized edema-L>R lat malleolus    Microalbuminuria    Leukocytosis w Lt shift     Long-term (current) use of anticoagulants [Z79.01]    Essential hypertension    Ankle edema    Sensorineural hearing loss, bilateral    Chronic pain syndrome-issue of broken controlled sub agreement     Bilateral leg edema worsening w her increasing inactivity     Muscle weakness (generalized) worse since 11-16 w drowsiness    Type 2 diabetes mellitus with diabetic nephropathy, without long-term current use of insulin (H)    Class 2 obesity due to excess calories with serious comorbidity and  body mass index (BMI) of 37.0 to 37.9 in adult    Bilateral deafness    Confusion    Hyperglycemia    Acute renal failure, unspecified acute renal failure type (H24)    Pneumonia of right lower lobe due to infectious organism    Severe sepsis (H)    Type 2 diabetes mellitus with hyperglycemia (H)    Glycosuria    Paravertebral mass    Abnormal finding on CT scan    Bilateral hearing loss, unspecified hearing loss type    Spasm of back muscles    Neural foraminal stenosis of cervical spine    Multilevel spondylosis    Anxiety and depression    Slow transit constipation    Weakness    Fall, initial encounter    Hypokalemia    Contusion of coccyx, initial encounter    Claudication of both lower extremities (H24)    Type 2 diabetes mellitus with hyperglycemia, unspecified whether long term insulin use (H)    Spinal stenosis    Lower extremity edema    Carpal tunnel syndrome of left wrist    Meniere's disease    Abdominal abscess    Lymphedema    Leg wound, right    Stage III pressure ulcer of left heel (H)    Cauda equina compression (H)    Rheumatoid arthritis involving both hands with positive rheumatoid factor (H)    COVID-19    Hypoxemia    COVID-19 virus infection     Past Surgical History:   Procedure Laterality Date    CATARACT IOL, RT/LT Left 7/2013    FOOT SURGERY      toe    GI SURGERY      IR LUMBAR/SACRAL TRANSFOR INJ BILATERAL Bilateral 3/11/2022    LAPAROSCOPIC HEPATECTOMY PARTIAL  11/4/2013    Procedure: LAPAROSCOPIC HEPATECTOMY PARTIAL;  Laparoscopic Debridement of Liver Abcess;  Surgeon: Sepideh Green MD;  Location: [facility] OR    ORTHOPEDIC SURGERY      PICC INSERTION  8/28/2013    5fr DL Power PICC, 41cm (1cm external length) in the R lateral brachial vein with tip in the SVC RA junction.    RECTAL SURGERY      1970s    RELEASE CARPAL TUNNEL Left 3/21/2023    Procedure: LEFT OPEN CARPAL TUNNEL RELEASE. LEFT RING FINGER A1 PULLEY RELEASE;  Surgeon: Claire Hamilton MD;  Location:  OR     VASCULAR SURGERY         Allergies   Allergen Reactions    Propofol Nausea and Vomiting    Shellfish Allergy      Other reaction(s): Dizziness    Capsaicin Rash and Blisters    Tegaderm Transparent Dressing (Informational Only) Itching and Rash       OUTPATIENT MEDICATIONS     New Prescriptions    No medications on file      Vitals:    01/28/24 1252 01/28/24 1257 01/28/24 1322 01/28/24 1349   BP: 128/58      Pulse: 82  80 74   Resp:  18 21 25   Temp:  98.9  F (37.2  C)     TempSrc:  Oral     SpO2: (!) 88%  92% 94%       Physical Exam   Constitutional: Oriented to person, place, and time. Appears well-developed and well-nourished.   HEENT:    Head: Atraumatic.   Neck: Normal range of motion. Neck supple.   Cardiovascular: Normal rate, regular rhythm and normal heart sounds.    Pulmonary/Chest: Normal effort  and breath sounds normal.   Abdominal: Soft. Bowel sounds are normal.   Musculoskeletal: Normal range of motion. 2+ lower extremity edema.   Neurological: Alert and oriented to person, place, and time.  No sensory deficit. No cranial nerve deficit. 3/5 motor strength lower extremity, 5/5 upper extremity.   Skin: Granulating wound left heel without evidence of cellulitis  Psychiatric: Normal mood and affect. Behavior is normal. Thought content normal.     DIAGNOSTICS    LABORATORY FINDINGS (REVIEWED AND INTERPRETED):  Labs Ordered and Resulted from Time of ED Arrival to Time of ED Departure   COMPREHENSIVE METABOLIC PANEL - Abnormal       Result Value    Sodium 139      Potassium 3.9      Carbon Dioxide (CO2) 29      Anion Gap 8      Urea Nitrogen 19.1      Creatinine 1.09 (*)     GFR Estimate 51 (*)     Calcium 8.1 (*)     Chloride 102      Glucose 105 (*)     Alkaline Phosphatase 149      AST 51 (*)     ALT 33      Protein Total 6.2 (*)     Albumin 3.4 (*)     Bilirubin Total 0.4     TROPONIN T, HIGH SENSITIVITY - Abnormal    Troponin T, High Sensitivity 38 (*)    BLOOD GAS VENOUS - Abnormal    pH Venous 7.39       pCO2 Venous 49      pO2 Venous 49 (*)     Bicarbonate Venous 30 (*)     Base Excess/Deficit Venous 4.7 (*)     FIO2 21      Oxyhemoglobin Venous 79 (*)     O2 Sat, Venous 81.4 (*)    CBC WITH PLATELETS AND DIFFERENTIAL - Abnormal    WBC Count 7.6      RBC Count 3.63 (*)     Hemoglobin 7.3 (*)     Hematocrit 27.8 (*)     MCV 77 (*)     MCH 20.1 (*)     MCHC 26.3 (*)     RDW 19.5 (*)     Platelet Count 212      % Neutrophils 67      % Lymphocytes 21      % Monocytes 9      % Eosinophils 1      % Basophils 0      % Immature Granulocytes 2      NRBCs per 100 WBC 1 (*)     Absolute Neutrophils 5.1      Absolute Lymphocytes 1.6      Absolute Monocytes 0.7      Absolute Eosinophils 0.0      Absolute Basophils 0.0      Absolute Immature Granulocytes 0.2      Absolute NRBCs 0.1     LACTIC ACID WHOLE BLOOD - Normal    Lactic Acid 1.0     TSH WITH FREE T4 REFLEX - Normal    TSH 0.33     ROUTINE UA WITH MICROSCOPIC REFLEX TO CULTURE         IMAGING (REVIEWED AND INTERPRETED):  CT Chest Pulmonary Embolism w Contrast   Final Result   IMPRESSION:   1.  No pulmonary embolism.   2.  Small patchy airspace opacity within the right upper lobe with few additional smaller airspace opacities bilaterally which could be related to pneumonia or COVID.      Head CT w/o contrast   Final Result   IMPRESSION:   1.  No acute intracranial abnormality.   2.  Small presumed meningioma along the anterior falx, similar in appearance compared to the prior MRI.            ECG (REVIEWED AND INTERPRETED):   ECG:   Performed at: 1/28/24, 13:13  HR:  77 bpm  Rhythm: Sinus rhythm  Axis: 37  QRS duration: 148 ms  QTC: 506 ms  ST changes: No ST segment elevation or depression, no T wave inversion,No Q wave  Interpretation: Sinus rhythm and right bundle branch block.  Compared to most recent ECG from:  10/6/23, no significant change was found.     I have reviewed the patient's ECG, with comments made as listed above. Please see scanned image for full  interpretation.         I, Odalysscott Gray, am serving as a scribe to document services personally performed by Suleiman Jain D.O., based on my observation and the provider s statements to me.    I, Suleiman Jain D.O., attest that Odalys Gray is acting in a scribe capacity, has observed my performance of the services and has documented them in accordance with my direction.    Suleiman Jain D.O.  EMERGENCY MEDICINE   01/28/24  [facility] EMERGENCY ROOM  [address] [place] [id number]  [phone]  Dept: [phone]       Suleiman Jain,   01/28/24 1231

## 2024-01-28 NOTE — ED NOTES
Purewick placed, patient noted to have redness on vagina and patient states she has been having pain there for a few weeks.

## 2024-01-28 NOTE — PHARMACY-ADMISSION MEDICATION HISTORY
Pharmacist Admission Medication History    Admission medication history is complete. The information provided in this note is only as accurate as the sources available at the time of the update.    Information Source(s): Patient's pharmacy via phone    Pertinent Information: verifed doses/strengths with [facility] [address]    Changes made to PTA medication list:  Added: None  Deleted: lisinopril  Changed: oxycodone now percocet, decreased buspar to BID, changed glipizide dose, increased lyrica from 50mg to 100mg    Medication Affordability:       Allergies reviewed with patient and updates made in EHR: yes    Medication History Completed By: Yolanda Valle [facility] 1/28/2024 5:09 PM    PTA Med List   Medication Sig Last Dose    busPIRone (BUSPAR) 7.5 MG tablet Take 7.5 mg by mouth 2 times daily 1/27/2024    diazepam (VALIUM) 2 MG tablet Take 1 tablet (2 mg) by mouth every 6 hours as needed for anxiety     DULoxetine (CYMBALTA) 60 MG capsule Take 60 mg by mouth every morning 1/27/2024    glipiZIDE (GLUCOTROL) 10 MG tablet Take 10 mg by mouth daily (with breakfast) 1/27/2024    glipiZIDE (GLUCOTROL) 5 MG tablet Take 5 mg by mouth daily (with dinner) 1/27/2024    hydrOXYzine (ATARAX) 25 MG tablet Take 25 mg by mouth 3 times daily as needed for anxiety prn    JARDIANCE 25 MG TABS tablet Take 25 mg by mouth every morning 1/27/2024    lamoTRIgine (LAMICTAL) 100 MG tablet Take 100 mg by mouth 2 times daily 1/27/2024    melatonin 3 MG tablet Take 1 tablet (3 mg) by mouth nightly as needed for sleep prn    metFORMIN (GLUCOPHAGE XR) 500 MG 24 hr tablet Take 500 mg by mouth daily (with breakfast) 1/27/2024    miconazole (MICATIN) 2 % external powder Apply topically 2 times daily as needed To the skin fold prn    omeprazole (PRILOSEC) 20 MG DR capsule Take 20 mg by mouth daily 1/27/2024    ondansetron (ZOFRAN ODT) 4 MG ODT tab Take 1 tablet (4 mg) by mouth every 8 hours as needed prn    oxyCODONE-acetaminophen (PERCOCET) 5-325 MG  tablet Take 1 tablet by mouth 4 times daily as needed for pain 1/27/2024    predniSONE (DELTASONE) 1 MG tablet Take 1 mg by mouth every morning (In addition to the 5 mg dose; 1 mg + 5 mg = 6 mg) 1/27/2024    predniSONE (DELTASONE) 5 MG tablet Take 5 mg by mouth every morning (In addition to the 1 mg dose; 5 mg + 1 mg = 6 mg) 1/27/2024    pregabalin (LYRICA) 100 MG capsule Take 100 mg by mouth 2 times daily 1/27/2024    solifenacin (VESICARE) 5 MG tablet Take 1 tablet (5 mg) by mouth daily 1/27/2024    triamterene-HCTZ (DYAZIDE) 37.5-25 MG capsule Take 1 capsule by mouth every morning Hold for SBP<110 1/27/2024

## 2024-01-29 ENCOUNTER — TELEPHONE (OUTPATIENT)
Dept: UROLOGY | Facility: CLINIC | Age: 84
End: 2024-01-29
Payer: MEDICARE

## 2024-01-29 ENCOUNTER — TELEPHONE (OUTPATIENT)
Dept: AUDIOLOGY | Facility: CLINIC | Age: 84
End: 2024-01-29
Payer: MEDICARE

## 2024-01-29 PROBLEM — L89.623 STAGE III PRESSURE ULCER OF LEFT HEEL (H): Status: ACTIVE | Noted: 2023-09-27

## 2024-01-29 PROBLEM — E11.21 TYPE 2 DIABETES MELLITUS WITH DIABETIC NEPHROPATHY, WITHOUT LONG-TERM CURRENT USE OF INSULIN (H): Status: ACTIVE | Noted: 2017-02-21

## 2024-01-29 PROBLEM — E11.65 TYPE 2 DIABETES MELLITUS WITH HYPERGLYCEMIA (H): Status: ACTIVE | Noted: 2022-03-07

## 2024-01-29 LAB
ANION GAP SERPL CALCULATED.3IONS-SCNC: 13 MMOL/L (ref 7–15)
ATRIAL RATE - MUSE: 77 BPM
BUN SERPL-MCNC: 20.8 MG/DL (ref 8–23)
CALCIUM SERPL-MCNC: 9 MG/DL (ref 8.8–10.2)
CHLORIDE SERPL-SCNC: 103 MMOL/L (ref 98–107)
CREAT SERPL-MCNC: 0.91 MG/DL (ref 0.51–0.95)
CRP SERPL-MCNC: 105 MG/L
D DIMER PPP FEU-MCNC: 1.16 UG/ML FEU (ref 0–0.5)
DEPRECATED HCO3 PLAS-SCNC: 26 MMOL/L (ref 22–29)
DIASTOLIC BLOOD PRESSURE - MUSE: 58 MMHG
EGFRCR SERPLBLD CKD-EPI 2021: 63 ML/MIN/1.73M2
ERYTHROCYTE [DISTWIDTH] IN BLOOD BY AUTOMATED COUNT: 19.8 % (ref 10–15)
GLUCOSE BLDC GLUCOMTR-MCNC: 202 MG/DL (ref 70–99)
GLUCOSE BLDC GLUCOMTR-MCNC: 208 MG/DL (ref 70–99)
GLUCOSE BLDC GLUCOMTR-MCNC: 245 MG/DL (ref 70–99)
GLUCOSE BLDC GLUCOMTR-MCNC: 314 MG/DL (ref 70–99)
GLUCOSE SERPL-MCNC: 211 MG/DL (ref 70–99)
HBA1C MFR BLD: 8.5 %
HCT VFR BLD AUTO: 30.9 % (ref 35–47)
HGB BLD-MCNC: 8 G/DL (ref 11.7–15.7)
INR PPP: 1.03 (ref 0.85–1.15)
INTERPRETATION ECG - MUSE: NORMAL
MCH RBC QN AUTO: 20 PG (ref 26.5–33)
MCHC RBC AUTO-ENTMCNC: 25.9 G/DL (ref 31.5–36.5)
MCV RBC AUTO: 77 FL (ref 78–100)
P AXIS - MUSE: 44 DEGREES
PLATELET # BLD AUTO: 221 10E3/UL (ref 150–450)
POTASSIUM SERPL-SCNC: 4.7 MMOL/L (ref 3.4–5.3)
PR INTERVAL - MUSE: 144 MS
QRS DURATION - MUSE: 148 MS
QT - MUSE: 448 MS
QTC - MUSE: 506 MS
R AXIS - MUSE: 37 DEGREES
RBC # BLD AUTO: 4 10E6/UL (ref 3.8–5.2)
SODIUM SERPL-SCNC: 142 MMOL/L (ref 135–145)
SYSTOLIC BLOOD PRESSURE - MUSE: 128 MMHG
T AXIS - MUSE: 42 DEGREES
VENTRICULAR RATE- MUSE: 77 BPM
WBC # BLD AUTO: 7.4 10E3/UL (ref 4–11)

## 2024-01-29 PROCEDURE — 250N000011 HC RX IP 250 OP 636: Mod: JZ | Performed by: FAMILY MEDICINE

## 2024-01-29 PROCEDURE — 258N000003 HC RX IP 258 OP 636: Performed by: FAMILY MEDICINE

## 2024-01-29 PROCEDURE — 250N000011 HC RX IP 250 OP 636: Performed by: HOSPITALIST

## 2024-01-29 PROCEDURE — 250N000009 HC RX 250: Performed by: NURSE PRACTITIONER

## 2024-01-29 PROCEDURE — 85379 FIBRIN DEGRADATION QUANT: CPT | Performed by: FAMILY MEDICINE

## 2024-01-29 PROCEDURE — 250N000013 HC RX MED GY IP 250 OP 250 PS 637: Performed by: HOSPITALIST

## 2024-01-29 PROCEDURE — 120N000001 HC R&B MED SURG/OB

## 2024-01-29 PROCEDURE — 82962 GLUCOSE BLOOD TEST: CPT

## 2024-01-29 PROCEDURE — 80048 BASIC METABOLIC PNL TOTAL CA: CPT | Performed by: HOSPITALIST

## 2024-01-29 PROCEDURE — 99233 SBSQ HOSP IP/OBS HIGH 50: CPT | Performed by: FAMILY MEDICINE

## 2024-01-29 PROCEDURE — 36415 COLL VENOUS BLD VENIPUNCTURE: CPT | Performed by: FAMILY MEDICINE

## 2024-01-29 PROCEDURE — 85610 PROTHROMBIN TIME: CPT | Performed by: FAMILY MEDICINE

## 2024-01-29 PROCEDURE — 250N000012 HC RX MED GY IP 250 OP 636 PS 637: Performed by: HOSPITALIST

## 2024-01-29 PROCEDURE — 85027 COMPLETE CBC AUTOMATED: CPT | Performed by: HOSPITALIST

## 2024-01-29 PROCEDURE — G0463 HOSPITAL OUTPT CLINIC VISIT: HCPCS

## 2024-01-29 PROCEDURE — 86140 C-REACTIVE PROTEIN: CPT | Performed by: FAMILY MEDICINE

## 2024-01-29 PROCEDURE — 250N000012 HC RX MED GY IP 250 OP 636 PS 637: Performed by: FAMILY MEDICINE

## 2024-01-29 RX ORDER — NALOXONE HYDROCHLORIDE 0.4 MG/ML
0.2 INJECTION, SOLUTION INTRAMUSCULAR; INTRAVENOUS; SUBCUTANEOUS
Status: DISCONTINUED | OUTPATIENT
Start: 2024-01-29 | End: 2024-02-08 | Stop reason: HOSPADM

## 2024-01-29 RX ORDER — NICOTINE POLACRILEX 4 MG
15-30 LOZENGE BUCCAL
Status: DISCONTINUED | OUTPATIENT
Start: 2024-01-29 | End: 2024-02-08 | Stop reason: HOSPADM

## 2024-01-29 RX ORDER — NALOXONE HYDROCHLORIDE 0.4 MG/ML
0.4 INJECTION, SOLUTION INTRAMUSCULAR; INTRAVENOUS; SUBCUTANEOUS
Status: DISCONTINUED | OUTPATIENT
Start: 2024-01-29 | End: 2024-02-08 | Stop reason: HOSPADM

## 2024-01-29 RX ORDER — DEXTROSE MONOHYDRATE 25 G/50ML
25-50 INJECTION, SOLUTION INTRAVENOUS
Status: DISCONTINUED | OUTPATIENT
Start: 2024-01-29 | End: 2024-02-08 | Stop reason: HOSPADM

## 2024-01-29 RX ORDER — LIDOCAINE HYDROCHLORIDE 20 MG/ML
JELLY TOPICAL ONCE
Status: COMPLETED | OUTPATIENT
Start: 2024-01-29 | End: 2024-01-29

## 2024-01-29 RX ADMIN — DEXAMETHASONE 6 MG: 2 TABLET ORAL at 13:20

## 2024-01-29 RX ADMIN — GLIPIZIDE 10 MG: 10 TABLET ORAL at 09:13

## 2024-01-29 RX ADMIN — INSULIN ASPART 3 UNITS: 100 INJECTION, SOLUTION INTRAVENOUS; SUBCUTANEOUS at 17:24

## 2024-01-29 RX ADMIN — INSULIN ASPART 3 UNITS: 100 INJECTION, SOLUTION INTRAVENOUS; SUBCUTANEOUS at 21:30

## 2024-01-29 RX ADMIN — PREGABALIN 100 MG: 100 CAPSULE ORAL at 09:13

## 2024-01-29 RX ADMIN — BENZOCAINE AND MENTHOL 1 LOZENGE: 15; 3.6 LOZENGE ORAL at 21:19

## 2024-01-29 RX ADMIN — OXYCODONE HYDROCHLORIDE AND ACETAMINOPHEN 1 TABLET: 5; 325 TABLET ORAL at 17:05

## 2024-01-29 RX ADMIN — TRIAMTERENE AND HYDROCHLOROTHIAZIDE 1 CAPSULE: 37.5; 25 CAPSULE ORAL at 09:14

## 2024-01-29 RX ADMIN — LIDOCAINE HYDROCHLORIDE: 20 JELLY TOPICAL at 12:54

## 2024-01-29 RX ADMIN — LAMOTRIGINE 100 MG: 100 TABLET ORAL at 21:18

## 2024-01-29 RX ADMIN — REMDESIVIR 200 MG: 100 INJECTION, POWDER, LYOPHILIZED, FOR SOLUTION INTRAVENOUS at 09:30

## 2024-01-29 RX ADMIN — Medication 2 MG: at 11:39

## 2024-01-29 RX ADMIN — HYDROXYZINE HYDROCHLORIDE 25 MG: 25 TABLET, FILM COATED ORAL at 21:17

## 2024-01-29 RX ADMIN — ENOXAPARIN SODIUM 40 MG: 100 INJECTION SUBCUTANEOUS at 17:14

## 2024-01-29 RX ADMIN — SENNOSIDES AND DOCUSATE SODIUM 1 TABLET: 8.6; 5 TABLET ORAL at 21:16

## 2024-01-29 RX ADMIN — NYSTATIN 500000 UNITS: 100000 SUSPENSION ORAL at 17:08

## 2024-01-29 RX ADMIN — NYSTATIN 500000 UNITS: 100000 SUSPENSION ORAL at 09:46

## 2024-01-29 RX ADMIN — PREGABALIN 100 MG: 100 CAPSULE ORAL at 21:17

## 2024-01-29 RX ADMIN — PANTOPRAZOLE SODIUM 40 MG: 40 TABLET, DELAYED RELEASE ORAL at 09:14

## 2024-01-29 RX ADMIN — MICONAZOLE NITRATE: 2 POWDER TOPICAL at 21:28

## 2024-01-29 RX ADMIN — DULOXETINE HYDROCHLORIDE 60 MG: 30 CAPSULE, DELAYED RELEASE ORAL at 09:13

## 2024-01-29 RX ADMIN — INSULIN ASPART 2 UNITS: 100 INJECTION, SOLUTION INTRAVENOUS; SUBCUTANEOUS at 09:51

## 2024-01-29 RX ADMIN — NYSTATIN 500000 UNITS: 100000 SUSPENSION ORAL at 13:10

## 2024-01-29 RX ADMIN — EMPAGLIFLOZIN 25 MG: 25 TABLET, FILM COATED ORAL at 09:14

## 2024-01-29 RX ADMIN — HYDROXYZINE HYDROCHLORIDE 25 MG: 25 TABLET, FILM COATED ORAL at 13:19

## 2024-01-29 RX ADMIN — SODIUM CHLORIDE 50 ML: 9 INJECTION, SOLUTION INTRAVENOUS at 11:40

## 2024-01-29 RX ADMIN — ACETAMINOPHEN 650 MG: 325 TABLET ORAL at 21:17

## 2024-01-29 RX ADMIN — LAMOTRIGINE 100 MG: 100 TABLET ORAL at 09:14

## 2024-01-29 RX ADMIN — CEFTRIAXONE 1 G: 1 INJECTION, POWDER, FOR SOLUTION INTRAMUSCULAR; INTRAVENOUS at 16:58

## 2024-01-29 RX ADMIN — GLIPIZIDE 5 MG: 5 TABLET ORAL at 17:05

## 2024-01-29 RX ADMIN — BUSPIRONE HYDROCHLORIDE 7.5 MG: 7.5 TABLET ORAL at 09:13

## 2024-01-29 RX ADMIN — BUSPIRONE HYDROCHLORIDE 7.5 MG: 7.5 TABLET ORAL at 21:18

## 2024-01-29 RX ADMIN — PREDNISONE 5 MG: 5 TABLET ORAL at 09:14

## 2024-01-29 RX ADMIN — PREDNISONE 1 MG: 1 TABLET ORAL at 09:49

## 2024-01-29 ASSESSMENT — ACTIVITIES OF DAILY LIVING (ADL)
ADLS_ACUITY_SCORE: 43
ADLS_ACUITY_SCORE: 43
ADLS_ACUITY_SCORE: 49
ADLS_ACUITY_SCORE: 47
ADLS_ACUITY_SCORE: 43
ADLS_ACUITY_SCORE: 47
ADLS_ACUITY_SCORE: 47
ADLS_ACUITY_SCORE: 43
ADLS_ACUITY_SCORE: 44
ADLS_ACUITY_SCORE: 43
ADLS_ACUITY_SCORE: 43
ADLS_ACUITY_SCORE: 49

## 2024-01-29 NOTE — PROVIDER NOTIFICATION
Bladder scanned for 1,097mL, dribbling stress incontinence, abdominal pain. Straight cath attempted x3, unable to place despite multiple staff members. Patient extremely anxious and restless, fighting staff. PRN Valium given. MD consulted urology.

## 2024-01-29 NOTE — CONSULTS
Care Management Initial Consult      General Information  Assessment completed with: Children, Son Davie  Type of CM/SW Visit: Initial Assessment  Primary Care Provider verified and updated as needed: Yes   Readmission within the last 30 days: no previous admission in last 30 days   Reason for Consult: discharge planning, health care directive, transportation  Advance Care Planning: Advance Care Planning Reviewed: no concerns identified        Communication Assessment  Patient's communication style: spoken language (English or Bilingual)        Cognitive  Cognitive/Neuro/Behavioral:   WDL except  Level of Consciousness: intermittent confusion       Orientation: disoriented to, situation             Living Environment:   People in home: alone     Current living Arrangements: house      Able to return to prior arrangements: other (see comments) (TBD further)     Family/Social Support:  Care provided by: self, child(pamela), homecare agency, other (see comments) (Private caregiver in the same building)  Provides care for: no one, unable/limited ability to care for self  Marital Status: Single  Children, Neighbor          Description of Support System: Supportive, Involved    Support Assessment: Caregiver experiencing high stress, Caregiver difficulty providing physical care/safety    Current Resources:   Patient receiving home care services: Yes  Skilled Home Care Services: Physical Therapy, Skilled Nursing (PCA services)  Community Resources: Home Care, PCA  Equipment currently used at home:    Supplies currently used at home: Diabetic Supplies    Employment/Financial:  Employment Status: retired     Financial Concerns: none   Referral to Financial Worker: No     Does the patient's insurance plan have a 3 day qualifying hospital stay waiver?  No    Lifestyle & Psychosocial Needs:  Social Determinants of Health     Food Insecurity: Not on file   Depression: Not at risk (8/18/2020)    PHQ-2     PHQ-2 Score: 0   Housing  Stability: Not on file   Tobacco Use: Medium Risk (1/28/2024)    Patient History     Smoking Tobacco Use: Former     Smokeless Tobacco Use: Former     Passive Exposure: Not on file   Financial Resource Strain: Not on file   Alcohol Use: Not on file   Transportation Needs: Not on file   Physical Activity: Not on file   Interpersonal Safety: Not on file   Stress: Not on file   Social Connections: Not on file     Functional Status:  Prior to admission patient needed assistance:   Dependent ADLs:: Wheelchair-independent, Ambulation-walker  Dependent IADLs:: Cleaning, Laundry, Shopping, Transportation  Assessment of Functional Status: Not at  functional baseline    Mental Health Status:  Mental Health Status: No Current Concerns       Chemical Dependency Status:  Chemical Dependency Status: No Current Concerns           Values/Beliefs:  Spiritual, Cultural Beliefs, Holiness Practices, Values that affect care: no (None identified)             Additional Information:  Writer met with pt's son Davie to introduce Care Management(CM), obtain an initial assessment, and offer discharge support. Pt resides in a Public Housing apartment alone. Son Davie is the main contact and primary caregiver who lives three miles away from the pt. Pt has been struggling since somewhat recently returning home from a TCU. Davie stated that pt has PCA hours available but, they are having trouble staffing the position. Pt also reportedly gets PT and SN services from a home care agency. Davie/pt were unsure of which company provides the PCA services vs the PT/SN services. [facility] and [facility] are the two companies involved. They have both been contacted by writer to assist with coordination of discharge services, unless pt needs TCU placement. Pt has a privete-pay neighbor/caregiver named Hernán who assists when able as well. Pt/family was provided with the Medicare Compare list for SNF.  Discussed associated Medicare star ratings to assist  "with choice for referrals/discharge planning Yes. Education was given to pt/family that star ratings are updated/maintained by Medicare and can be reviewed by visiting www.medicare.gov Yes - declined for now as stating pt will refuse to go to a TCU.    01/28 per RN DAVIDA Anna-\"Patient presents to ED from home via EMS. Son called EMS. Patient diagnosed with covid a few days ago and was seen here at [facility] and discharged to home. Son called EMS because she has not been able to take care of herself due to weakness, confusion, not able to take care of herself and having incontinence. O2 is 86% on RA upon arrival to ED. Placed on 2L NC and O2 up to ~96%. EMS . Patient is very hard of hearing. Difficult to assess mental status due to hearing problem.\"    PT, OT, and WOC Consults pending. Anticipate improvement and then home with continued services vs. TCU placement. CM to follow-up on consults and assist with service coordination needs as indicated/desired by pt and son. Transport per son Davie anticipated.    Derrek Mobley RN      "

## 2024-01-29 NOTE — PROGRESS NOTES
"Care Management Follow Up    Length of Stay (days): 1    Expected Discharge Date: 1-2 days     Concerns to be Addressed: Covid +, 02, IV Remdesivir, IV antibiotics. Urology consult and PT/OT pending.     Care coordination/care conferences, discharge planning  Coordination with current home care providers vs. TCU placement needed at discharge  Patient plan of care discussed at interdisciplinary rounds:  CM reviewed all ER notes. Transferred to  at this time for IP admission.     Anticipated Discharge Disposition: TBD- return home and resume HC services vs TCU. PT/OT pending.      Anticipated Discharge Services: TBD- return home and resume HC services vs TCU. PT/OT pending.     Anticipated Discharge DME: TBD- currently requiring 02    Referrals Placed by CM/SW: Homecare (Current in-home service providers notified of admission and likely continued need at time of discharge)  Private pay costs discussed:  None indicated at this time. None discussed by this CM.    Additional Information:  Chart reviewed.     [facility]   Erlinda, Nurse confirms patient is open to skilled services for RN (twice per week, wound care) and PT. Will need order to resume services at hospital discharge.     [facility]  Diamond reports [facility] has been providing individualized customized living support (PCA/homemaking) services, however states that the patient had called [facility] last week (1/22) stating she did not want the staff person sent out and therefor was going to cancel the services altogether. She had been getting 12 hours per week (M/W/F for 4 hours per day). States \"she could have been getting more hours but patient did not want them\".   [facility] had notified Medicare/Medica Care Coordinator. [facility] states can possibly provide the services again, however may be a delay in getting those services in place due to staffing.     [facility] [place] Medicare/Medica Care Coordinator is [facility] [phone]     Sarah SMITH " Vidhya, CHERELLE

## 2024-01-29 NOTE — DISCHARGE INSTRUCTIONS
"[facility] DISCHARGE INSTRUCTIONS:  Pressure Injury Prevention (PIP) Plan:  If patient is declining pressure injury prevention interventions: Explore reason why and address patient's concerns, Educate on pressure injury risk and prevention intervention(s), If patient is still declining, document \"informed refusal\" , and Ensure Care team is aware ( provider, charge nurse, etc)  Mattress: Follow bed algorithm, reassess daily and order specialty mattress, if indicated.  HOB: Maintain at or below 30 degrees, unless contraindicated  Repositioning in bed: Every 1-2 hours , Left/right positioning; avoid supine, Raise foot of bed prior to raising head of bed, to reduce patient sliding down (shear), and Frequent microturns using TAPS wedges, as patient tolerates  Heels: Keep elevated off mattress and Pillows under calves  Protective Dressing: Sacral Mepilex for prevention (#775354),  especially for the agitated patient   Positioning Equipment: None  Chair positioning: Chair cushion (#009149)    If patient has a buttock pressure injury, or high risk for PI use chair cushion or SPS.  Moisture Management: Perineal cleansing /protection: Follow Incontinence Protocol, Avoid brief in bed, Clean and dry skin folds with bathing , Consider InterDry (#528782) between folds, and Moisturize dry skin  Under Devices: Inspect skin under all medical devices during skin inspection , Ensure tubes are stabilized without tension, and Ensure patient is not lying on medical devices or equipment when repositioned  Ask provider to discontinue device when no longer needed.     L heel  Paint with betadine, allow to dry  Offload heel at all times in bed, pillow under each calf  Do not cover with mepilex    Sacrum  Flush wound with NS and pat dry  Apply medihoney gel to wound base  Cover with mepilex  Change MWF + PRN if soiled, saturated, falls off  "

## 2024-01-29 NOTE — CONSULTS
[facility] - CATHETER PLACEMENT PROCEDURE NOTE    Type of Consult: inpatient  Place of Service: [facility]  Reason for Procedure: Difficult catheter placement after failed attempts by nursing, urinary retention  Requested by: Dr. Kaur    History: This is an 83 year old female with hx of mixed urinary incontinence, urinary retention (3/2023), hematuria (2015); Admitted 1/28 for Hypoxemia, COVID+.  Urology was requested to place an indwelling catheter after failed attempts by nursing, for urinary retention.     Bladder scanned for >1000 ml. Pt reports worsened urinary incontinence.     Previously seen by Dr. Foreman ([facility]). Takes Vesicare 5 mg po daily.     Pt is taking medications which may contribute to UR: Vesicare, Valium, Buspar, Atarax, melatonin.     Creatinine 0.91.     Last BM: stool smear today, uncertain of last full movement.     UA RESULTS:  Recent Labs   Lab Test 01/28/24  2156 04/12/16  2140 03/14/16  1516   COLOR Light Yellow   < > Yellow   APPEARANCE Clear   < > Clear   URINEGLC >1000*   < > Negative   URINEBILI Negative   < > Negative   URINEKETONE Trace*   < > Negative   SG 1.020   < > 1.010   UBLD Negative   < > Negative   URINEPH 5.5   < > 5.0   PROTEIN 20*   < > Negative   UROBILINOGEN  --   --  0.2   NITRITE Negative   < > Negative   LEUKEST 75 Angy/uL*   < > Small*   RBCU 6*   < > O - 2   WBCU 4   < > O - 2    < > = values in this interval not displayed.       Procedure:  The patient's vaginal orifice was initially prepped with Urojet gel for comfort. The urethral meatus was not well visualized but the area was prepped with Betadine solution. An 18 Fr coude tip catheter was liberally lubricated and inserted with moderate difficulty due to patient anxiety/movement and anatomy. The catheter successfully reached the bladder and the catheter balloon was inflated with 10 cc of sterile saline. Clear yellow colored urine returned. The patient appeared to tolerate the  procedure moderate discomfort.  The catheter was connected to a gravity drainage bag and secured to the Left thigh.     Volume of urine drained following placement: >850 ml    Plan:   - Maintain nolasco at hospital discharge. RN to teach nolasco cares. Nolasco discharge instructions placed.   - Recommend discontinuing Vesicare and other medications which may be contributing to the UR.   - UA with 6 RBC, otherwise benign for infection.   - Recommend bowel regimen.   - Follow up with [facility] in 1-2 weeks.   - [facility] will sign off. Please re-consult with any new or worsening urologic concerns.    Please contact [facility] with any questions or concerns [phone].      Stanford Bull, APRN, CNP  [facility]   [phone]

## 2024-01-29 NOTE — PROGRESS NOTES
"[facility]    Medicine Progress Note - Hospitalist Service    Date of Admission:  1/28/2024    Assessment & Plan   83-year-old female with a known recent diagnosis of SARS COVID returns to the hospital with increasing weakness, urinary incontinence, and now hypoxia.  She has been started on remdesivir and steroids.    Sars-Cov-2 infection  Hypoxia noted with acute respiratory failure.   Given baseline prednisone, will choose dexamethasone for therapeutic.   Remdesivir t.    Suspect right upper lobe patchy opacity is viral and patient is already on ceftriaxone  Broadened antibiotics if clinically indicated    Type 2 diabetes  Continue oral meds  Correction insulin  Anticipate hyperglycemia with steroids     Oral candidiasis  Nystatin swish/swallow ordered     Suspected acute cystitis/abnormal UA  Continue ceftriaxone     Microcytic anemia  Repeat CBC in the am.   Denies melena.   Has hemorrhoids with occasional BRBPR.   Clinically monitor and transfuse if indicated.     Right pleural effusion  Trace size.   Clinically monitor with hypoxia     Meningioma  Presumed diagnosis on imaging, unchanged from prior     Right heel wound  Wound care consult, antibiotics as above          Diet: Regular Diet Adult    DVT Prophylaxis: Enoxaparin (Lovenox) SQ  Teran Catheter: Not present  Lines: None     Cardiac Monitoring: None  Code Status: No CPR- Do NOT Intubate            # Hypocalcemia: Lowest Ca = 8.1 mg/dL in last 2 days, will monitor and replace as appropriate     # Hypoalbuminemia: Lowest albumin = 3.4 g/dL at 1/28/2024  1:29 PM, will monitor as appropriate     # Hypertension: Noted on problem list     # DMII: A1C = 8.5 % (Ref range: <5.7 %) within past 6 months    # Obesity: Estimated body mass index is 31.76 kg/m  as calculated from the following:    Height as of 1/24/24: 1.626 m (5' 4\").    Weight as of 1/24/24: 83.9 kg (185 lb).       # Financial/Environmental Concerns: none            "       Nagi Kaur MD  Hospitalist Service  [facility]  Securely message with [employer] (more info)  Text page via [employer] Paging/Directory   ______________________________________________________________________    Interval History   Patient is deaf.  We use writing to communicate.  She is wanting to drink or some ice chips.  I informed her nurse.  She has a pressure wound on her left foot.  She is open to taking medication for COVID.  She does not look to be in any distress.  She is requiring oxygen.    Physical Exam   Vital Signs: Temp: 97.7  F (36.5  C) Temp src: Oral BP: 137/82 Pulse: 67   Resp: 18 SpO2: 96 % O2 Device: Nasal cannula Oxygen Delivery: 4 LPM  Weight: 0 lbs 0 oz    GENERAL:  Alert, appears comfortable, in no acute distress, appears stated age   HEAD:  Normocephalic, without obvious abnormality, atraumatic   EYES:  PERRL, conjunctiva/corneas clear, no scleral icterus, EOM's intact   LUNGS:   Clear to auscultation bilaterally, no rales, rhonchi, or wheezing, symmetric chest rise on inhalation, respirations unlabored   HEART:  Regular rate and rhythm, S1 and S2 normal, no murmur, rub, or gallop    ABDOMEN:   Soft, mildly tender diffusely, bowel sounds active all four quadrants, no masses, no organomegaly, no rebound or guarding   EXTREMITIES: Extremities normal, atraumatic, no cyanosis, pressure wound to left heel   SKIN: Dry to touch, no exanthems in the visualized areas   NEURO: Alert, oriented x 4, moves all four extremities freely/spontaneously   PSYCH: Cooperative, behavior is appropriate            51 MINUTES SPENT BY ME on the date of service doing chart review, history, exam, documentation & further activities per the note.      Data     I have personally reviewed the following data over the past 24 hrs:    7.4  \   8.0 (L)   / 221     142 103 20.8 /  202 (H)   4.7 26 0.91 \     ALT: 33 AST: 51 (H) AP: 149 TBILI: 0.4   ALB: 3.4 (L) TOT PROTEIN: 6.2 (L) LIPASE: N/A      Trop: 34 (H) BNP: N/A     TSH: 0.33 T4: N/A A1C: 8.5 (H)     Procal: N/A CRP: 105.00 (H) Lactic Acid: 1.0       INR:  1.03 PTT:  N/A   D-dimer:  1.16 (H) Fibrinogen:  N/A       Imaging results reviewed over the past 24 hrs:   Recent Results (from the past 24 hour(s))   Head CT w/o contrast    Narrative    EXAM: CT HEAD W/O CONTRAST  LOCATION: [facility]  DATE: 1/28/2024    INDICATION: Altered mental status  COMPARISON: Head MRI 07/20/2021  TECHNIQUE: Routine CT Head without IV contrast. Multiplanar reformats. Dose reduction techniques were used.    FINDINGS:  Small area of nodular hyperattenuation along the right anterior falx measuring roughly 9 x 11 x 9 mm, similar in size compared to the prior MRI. No significant mass effect.    Background of mild generalized volume loss is present with nonspecific white matter hypoattenuation presumably representing chronic small vessel ischemic change. No acute osseous abnormality.      Impression    IMPRESSION:  1.  No acute intracranial abnormality.  2.  Small presumed meningioma along the anterior falx, similar in appearance compared to the prior MRI.   CT Chest Pulmonary Embolism w Contrast    Narrative    EXAM: CT CHEST PULMONARY EMBOLISM W CONTRAST  LOCATION: [facility]  DATE: 1/28/2024    INDICATION: dyspnea hypoxemia covid positive  COMPARISON: None.  TECHNIQUE: CT chest pulmonary angiogram during arterial phase injection of IV contrast. Multiplanar reformats and MIP reconstructions were performed. Dose reduction techniques were used.   CONTRAST: ISOVUE 370 75 mL    FINDINGS:  ANGIOGRAM CHEST: Pulmonary arteries are normal caliber and negative for pulmonary emboli. Thoracic aorta is negative for dissection. No CT evidence of right heart strain.    LUNGS AND PLEURA: Trace right effusion with bibasilar atelectasis. Small patchy airspace opacity within the right upper lobe. Additional smaller airspace opacities are  noted bilaterally.  Bibasilar atelectasis.    MEDIASTINUM/AXILLAE: Heart is normal in size. No mediastinal, axillary, or hilar adenopathy    CORONARY ARTERY CALCIFICATION: Mild.    UPPER ABDOMEN: Normal.    MUSCULOSKELETAL: Degenerative changes of the spine.      Impression    IMPRESSION:  1.  No pulmonary embolism.  2.  Small patchy airspace opacity within the right upper lobe with few additional smaller airspace opacities bilaterally which could be related to pneumonia or COVID.

## 2024-01-29 NOTE — PLAN OF CARE
Goal Outcome Evaluation:  Patient is alert and oriented, forgetful at times. Anxious and tearful at times but PRN given per MAR with good effects. VSS on 4L o2 nasal cannula. No complaints of sob. Some nausea at start of shift but has resolved. Patient complains of pain to the back, perineum, and left heel. Oxycodone given x1 and repositioning with good results. Incontinence of urine multiple times throughout the night. Patient requests purewick but with redness and irritation to perineum, it was removed. Special precautions maintained. Call light within reach.    Problem: Adult Inpatient Plan of Care  Goal: Absence of Hospital-Acquired Illness or Injury  Intervention: Identify and Manage Fall Risk  Recent Flowsheet Documentation  Taken 1/29/2024 0351 by Brianna Ruggiero RN  Safety Promotion/Fall Prevention:   activity supervised   lighting adjusted   nonskid shoes/slippers when out of bed   room near nurse's station   safety round/check completed  Taken 1/28/2024 2347 by Brianna Ruggiero RN  Safety Promotion/Fall Prevention:   activity supervised   lighting adjusted   nonskid shoes/slippers when out of bed   room near nurse's station   safety round/check completed  Taken 1/28/2024 2052 by Brianna Ruggiero RN  Safety Promotion/Fall Prevention:   activity supervised   lighting adjusted   nonskid shoes/slippers when out of bed   room near nurse's station   safety round/check completed  Intervention: Prevent Skin Injury  Recent Flowsheet Documentation  Taken 1/29/2024 0351 by Brianna Ruggiero RN  Body Position:   turned   left  Device Skin Pressure Protection:   absorbent pad utilized/changed   tubing/devices free from skin contact  Taken 1/28/2024 2347 by Brianna Ruggiero RN  Body Position:   turned   right  Device Skin Pressure Protection:   absorbent pad utilized/changed   tubing/devices free from skin contact  Taken 1/28/2024 2052 by Brianna Ruggiero RN  Body Position:   log-rolled   turned   right  Device Skin Pressure  Protection:   absorbent pad utilized/changed   tubing/devices free from skin contact  Intervention: Prevent Infection  Recent Flowsheet Documentation  Taken 1/29/2024 0351 by Brianna Ruggiero RN  Infection Prevention:   hand hygiene promoted   rest/sleep promoted   single patient room provided  Taken 1/28/2024 2347 by Brianna Ruggiero RN  Infection Prevention:   hand hygiene promoted   rest/sleep promoted   single patient room provided  Taken 1/28/2024 2052 by Brianna Ruggiero RN  Infection Prevention:   hand hygiene promoted   rest/sleep promoted   single patient room provided  Goal: Optimal Comfort and Wellbeing  Outcome: Progressing

## 2024-01-29 NOTE — TELEPHONE ENCOUNTER
Tried to reach Babi this patients home care nurse to go over the medication that the patient would liked changed by Dr. Foreman.     I tried to reach Abbi at [phone] the number she left with our call center and received her VM. Left a message stating I was returning her call and to call us back at [phone]

## 2024-01-29 NOTE — TELEPHONE ENCOUNTER
[facility] Call Center    Phone Message    May a detailed message be left on voicemail: yes     Reason for Call: Per pt homecare nurse Abbi calling wanting to speak with a care team member regarding a medication change pt is wanting to make. Please call Abbi     Action Taken: Message routed to:  Other: [facility]    Travel Screening: Not Applicable

## 2024-01-29 NOTE — CONSULTS
[facility] Nurse Inpatient Assessment     Consulted for: R heel, perineal/groin fissures, sacral wound    Summary:     Patient History (according to provider note(s):      Sars-Cov-2 infection: Hypoxia noted without respiratory failure. Given baseline prednisone, will choose dexamethasone for therapeutic. Can add additional Tx if no clinical improvement.  Suspect right upper lobe patchy opacity is viral.  Broadened antibiotics if clinically indicated.     Oral candidiasis: Nystatin swish/swallow ordered     Suspected acute cystitis: Empiric ceftriaxone. UA hasn't been collected. Do not suspect she's likely to provide a clean sample.     Microcytic anemia: Repeat CBC in the am. Denies melena. Has hemorrhoids with occasional BRBPR. Clinically monitor and transfuse if indicated.     Right pleural effusion: Trace size. Clinically monitor with hypoxia     Meningioma: Presumed diagnosis on imaging, unchanged from prior     Right heel wound: Wound care consult, antibiotics as above.     Med rec is pending    Assessment:      Pressure Injury Location: L heel (not right as in consult)    1/29    Last photo: 1/29  Wound type: Pressure Injury     Pressure Injury Stage: Deep Tissue Pressure Injury (DTPI), present on admission   Wound history/plan of care:   patient lives alone at home but has a care person come in three times per week.    Wound base: 80 % purple and starting to covert to black , 20 % non-granular tissue     Palpation of the wound bed: boggy      Drainage: scant     Description of drainage: serosanguinous     Measurements (length x width x depth, in cm) 2.5  x 2.5  x  0.3 cm - including the area under the callus     Tunneling N/A     Undermining N/A - dried callus can be gently removed  Periwound skin: Dry/scaly      Color: dusky      Temperature: cool  Odor: none  Pain: moderate and with movement , aching and throbbing  Pain intervention prior to dressing change: slow and gentle  cares   Treatment goal: Heal , Protection, and offloading pressure  STATUS: initial assessment  Supplies ordered: supplies stored on unit    My PI Risk Assessment     Sensory Perception: 2 - Very Limited     Moisture: 1 - Constantly moist     Activity: 1 - Bedfast      Mobility: 2 - Very limited     Nutrition: 2 - Probably inadequate      Friction/Shear: 2 - Potential problem      TOTAL: 10  _______________________________________________________________________________________________________________________________________________________________________________________________________--    Skin Injury Location: groin, labia    1/29    Last photo: 1/29  Skin injury due to: Friction and Irritant contact dermatitis due to fecal, urinary or dual incontinence   Skin history and plan of care:   patient had a purwick and brief, redness could be both from friction and possibly pressure, patient has large legs with edema. Patient has overflow incontinence, awaiting for a nolasco.  Affected area:      Skin assessment: Intact     Measurements (length x width x depth, in cm) 8  x 4  x  0 cm      Color: red     Temperature  warm     Drainage: none .      Color: none      Odor: none  Pain: mild and facial expression of distress, aching  Pain interventions prior to dressing change: slow and gentle cares   Treatment goal: Decrease moisture and Protection  STATUS: initial assessment  Supplies ordered: supplies stored on unit  ______________________________________________________________________________________________________________________________________________________________________________________________________________________________    Pressure Injury Location: sacrum    1/29    Last photo: 1/29  Wound type: Pressure Injury     Pressure Injury Stage: Deep Tissue Pressure Injury (DTPI), present on admission   Wound history/plan of care:   see above    Wound base: 100 % purple     Palpation of the wound bed: normal       "Drainage: none     Description of drainage: none     Measurements (length x width x depth, in cm) 2.1  x 1/4  x  0 cm to sacrum, 0.6 x 1.3 x 0 cm to the right sacrum     Tunneling N/A     Undermining N/A  Periwound skin: Intact      Color: red      Temperature: warm  Odor: none  Pain: moderate and facial expression of distress, aching and shooting  Pain intervention prior to dressing change: slow and gentle cares   Treatment goal: Protection  STATUS: initial assessment  Supplies ordered: supplies stored on unit       Treatment Plan:     Pressure Injury Prevention (PIP) Plan:  If patient is declining pressure injury prevention interventions: Explore reason why and address patient's concerns, Educate on pressure injury risk and prevention intervention(s), If patient is still declining, document \"informed refusal\" , and Ensure Care team is aware ( provider, charge nurse, etc)  Mattress: Follow bed algorithm, reassess daily and order specialty mattress, if indicated.  HOB: Maintain at or below 30 degrees, unless contraindicated  Repositioning in bed: Every 1-2 hours , Left/right positioning; avoid supine, Raise foot of bed prior to raising head of bed, to reduce patient sliding down (shear), and Frequent microturns using TAPS wedges, as patient tolerates  Heels: Keep elevated off mattress and Pillows under calves  Protective Dressing: Sacral Mepilex for prevention (#261875),  especially for the agitated patient   Positioning Equipment: None  Chair positioning: Chair cushion (#[id number])    If patient has a buttock pressure injury, or high risk for PI use chair cushion or SPS.  Moisture Management: Perineal cleansing /protection: Follow Incontinence Protocol, Avoid brief in bed, Clean and dry skin folds with bathing , Consider InterDry (#858715) between folds, and Moisturize dry skin  Under Devices: Inspect skin under all medical devices during skin inspection , Ensure tubes are stabilized without tension, and Ensure " patient is not lying on medical devices or equipment when repositioned  Ask provider to discontinue device when no longer needed.     L heel  Paint with betadine, allow to dry  Offload heel at all times in bed, pillow under each calf  Do not cover with mepilex    Orders: Written    RECOMMEND PRIMARY TEAM ORDER: None, at this time  Education provided: importance of repositioning, Moisture management, and Off-loading pressure  Discussed plan of care with: Patient, Nurse, and Physician  [facility] nurse follow-up plan: weekly  Notify WO if wound(s) deteriorate.  Nursing to notify the Provider(s) and re-consult the [facility] Nurse if new skin concern.    DATA:     Current support surface: Standard  Standard gel/foam mattress (IsoFlex, Atmos air, etc)  Containment of urine/stool: Incontinence Protocol  BMI: There is no height or weight on file to calculate BMI.   Active diet order: Orders Placed This Encounter      Regular Diet Adult     Output: I/O last 3 completed shifts:  In: 300 [I.V.:300]  Out: 850 [Urine:850]     Labs:   Recent Labs   Lab 01/29/24  0912 01/29/24  0907 01/28/24  1329   ALBUMIN  --   --  3.4*   HGB  --  8.0* 7.3*   INR 1.03  --   --    WBC  --  7.4 7.6   A1C  --   --  8.5*     Pressure injury risk assessment:   Sensory Perception: 3-->slightly limited  Moisture: 2-->very moist  Activity: 2-->chairfast  Mobility: 2-->very limited  Nutrition: 3-->adequate  Friction and Shear: 2-->potential problem  Anthony Score: 14    WAYNE LedbetterN RN CWOCN  Pager no longer in use, please contact through [place] group: [facility]

## 2024-01-30 ENCOUNTER — APPOINTMENT (OUTPATIENT)
Dept: PHYSICAL THERAPY | Facility: CLINIC | Age: 84
DRG: 177 | End: 2024-01-30
Attending: HOSPITALIST
Payer: MEDICARE

## 2024-01-30 ENCOUNTER — TELEPHONE (OUTPATIENT)
Dept: UROLOGY | Facility: CLINIC | Age: 84
End: 2024-01-30
Payer: MEDICARE

## 2024-01-30 ENCOUNTER — APPOINTMENT (OUTPATIENT)
Dept: OCCUPATIONAL THERAPY | Facility: CLINIC | Age: 84
DRG: 177 | End: 2024-01-30
Attending: HOSPITALIST
Payer: MEDICARE

## 2024-01-30 DIAGNOSIS — N39.41 URGE INCONTINENCE: ICD-10-CM

## 2024-01-30 LAB
ANION GAP SERPL CALCULATED.3IONS-SCNC: 11 MMOL/L (ref 7–15)
BUN SERPL-MCNC: 28.1 MG/DL (ref 8–23)
CALCIUM SERPL-MCNC: 9.2 MG/DL (ref 8.8–10.2)
CHLORIDE SERPL-SCNC: 101 MMOL/L (ref 98–107)
CREAT SERPL-MCNC: 0.92 MG/DL (ref 0.51–0.95)
CRP SERPL-MCNC: 59.7 MG/L
D DIMER PPP FEU-MCNC: 0.77 UG/ML FEU (ref 0–0.5)
DEPRECATED HCO3 PLAS-SCNC: 28 MMOL/L (ref 22–29)
EGFRCR SERPLBLD CKD-EPI 2021: 62 ML/MIN/1.73M2
ERYTHROCYTE [DISTWIDTH] IN BLOOD BY AUTOMATED COUNT: 20 % (ref 10–15)
GLUCOSE BLDC GLUCOMTR-MCNC: 240 MG/DL (ref 70–99)
GLUCOSE BLDC GLUCOMTR-MCNC: 255 MG/DL (ref 70–99)
GLUCOSE BLDC GLUCOMTR-MCNC: 263 MG/DL (ref 70–99)
GLUCOSE BLDC GLUCOMTR-MCNC: 266 MG/DL (ref 70–99)
GLUCOSE BLDC GLUCOMTR-MCNC: 343 MG/DL (ref 70–99)
GLUCOSE SERPL-MCNC: 242 MG/DL (ref 70–99)
HCT VFR BLD AUTO: 29.2 % (ref 35–47)
HGB BLD-MCNC: 7.7 G/DL (ref 11.7–15.7)
MCH RBC QN AUTO: 20 PG (ref 26.5–33)
MCHC RBC AUTO-ENTMCNC: 26.4 G/DL (ref 31.5–36.5)
MCV RBC AUTO: 76 FL (ref 78–100)
PLATELET # BLD AUTO: 233 10E3/UL (ref 150–450)
POTASSIUM SERPL-SCNC: 4.7 MMOL/L (ref 3.4–5.3)
RBC # BLD AUTO: 3.85 10E6/UL (ref 3.8–5.2)
SODIUM SERPL-SCNC: 140 MMOL/L (ref 135–145)
WBC # BLD AUTO: 5.5 10E3/UL (ref 4–11)

## 2024-01-30 PROCEDURE — 80048 BASIC METABOLIC PNL TOTAL CA: CPT | Performed by: FAMILY MEDICINE

## 2024-01-30 PROCEDURE — 250N000013 HC RX MED GY IP 250 OP 250 PS 637: Performed by: FAMILY MEDICINE

## 2024-01-30 PROCEDURE — 85379 FIBRIN DEGRADATION QUANT: CPT | Performed by: FAMILY MEDICINE

## 2024-01-30 PROCEDURE — 97166 OT EVAL MOD COMPLEX 45 MIN: CPT | Mod: GO

## 2024-01-30 PROCEDURE — 97161 PT EVAL LOW COMPLEX 20 MIN: CPT | Mod: GP

## 2024-01-30 PROCEDURE — 85027 COMPLETE CBC AUTOMATED: CPT | Performed by: FAMILY MEDICINE

## 2024-01-30 PROCEDURE — 99233 SBSQ HOSP IP/OBS HIGH 50: CPT | Performed by: FAMILY MEDICINE

## 2024-01-30 PROCEDURE — 258N000003 HC RX IP 258 OP 636: Performed by: FAMILY MEDICINE

## 2024-01-30 PROCEDURE — 250N000013 HC RX MED GY IP 250 OP 250 PS 637: Performed by: INTERNAL MEDICINE

## 2024-01-30 PROCEDURE — 36415 COLL VENOUS BLD VENIPUNCTURE: CPT | Performed by: FAMILY MEDICINE

## 2024-01-30 PROCEDURE — 120N000001 HC R&B MED SURG/OB

## 2024-01-30 PROCEDURE — 250N000012 HC RX MED GY IP 250 OP 636 PS 637: Performed by: FAMILY MEDICINE

## 2024-01-30 PROCEDURE — 250N000011 HC RX IP 250 OP 636: Mod: JZ | Performed by: FAMILY MEDICINE

## 2024-01-30 PROCEDURE — 250N000013 HC RX MED GY IP 250 OP 250 PS 637: Performed by: HOSPITALIST

## 2024-01-30 PROCEDURE — 97535 SELF CARE MNGMENT TRAINING: CPT | Mod: GO

## 2024-01-30 PROCEDURE — 97530 THERAPEUTIC ACTIVITIES: CPT | Mod: GP

## 2024-01-30 PROCEDURE — XW033E5 INTRODUCTION OF REMDESIVIR ANTI-INFECTIVE INTO PERIPHERAL VEIN, PERCUTANEOUS APPROACH, NEW TECHNOLOGY GROUP 5: ICD-10-PCS | Performed by: FAMILY MEDICINE

## 2024-01-30 PROCEDURE — 250N000012 HC RX MED GY IP 250 OP 636 PS 637: Performed by: HOSPITALIST

## 2024-01-30 PROCEDURE — 86140 C-REACTIVE PROTEIN: CPT | Performed by: FAMILY MEDICINE

## 2024-01-30 PROCEDURE — 250N000011 HC RX IP 250 OP 636: Performed by: HOSPITALIST

## 2024-01-30 RX ORDER — BISACODYL 10 MG
10 SUPPOSITORY, RECTAL RECTAL DAILY PRN
Status: DISCONTINUED | OUTPATIENT
Start: 2024-01-30 | End: 2024-02-08 | Stop reason: HOSPADM

## 2024-01-30 RX ORDER — POLYETHYLENE GLYCOL 3350 17 G/17G
17 POWDER, FOR SOLUTION ORAL 2 TIMES DAILY
Status: DISCONTINUED | OUTPATIENT
Start: 2024-01-30 | End: 2024-02-08 | Stop reason: HOSPADM

## 2024-01-30 RX ORDER — OXYBUTYNIN CHLORIDE 15 MG/1
15 TABLET, EXTENDED RELEASE ORAL DAILY
Qty: 30 TABLET | Refills: 0 | Status: SHIPPED | OUTPATIENT
Start: 2024-01-30 | End: 2024-02-06

## 2024-01-30 RX ORDER — AMOXICILLIN 250 MG
1 CAPSULE ORAL 2 TIMES DAILY
Status: DISCONTINUED | OUTPATIENT
Start: 2024-01-30 | End: 2024-02-08 | Stop reason: HOSPADM

## 2024-01-30 RX ORDER — POLYETHYLENE GLYCOL 3350 17 G/17G
17 POWDER, FOR SOLUTION ORAL DAILY
Status: DISCONTINUED | OUTPATIENT
Start: 2024-01-30 | End: 2024-01-30

## 2024-01-30 RX ORDER — BENZOCAINE/MENTHOL 6 MG-10 MG
LOZENGE MUCOUS MEMBRANE 2 TIMES DAILY
Status: DISCONTINUED | OUTPATIENT
Start: 2024-01-30 | End: 2024-02-08 | Stop reason: HOSPADM

## 2024-01-30 RX ORDER — AMOXICILLIN 250 MG
2 CAPSULE ORAL 2 TIMES DAILY
Status: DISCONTINUED | OUTPATIENT
Start: 2024-01-30 | End: 2024-02-08 | Stop reason: HOSPADM

## 2024-01-30 RX ADMIN — DEXAMETHASONE 6 MG: 2 TABLET ORAL at 14:23

## 2024-01-30 RX ADMIN — INSULIN GLARGINE 10 UNITS: 100 INJECTION, SOLUTION SUBCUTANEOUS at 08:20

## 2024-01-30 RX ADMIN — HYDROXYZINE HYDROCHLORIDE 25 MG: 25 TABLET, FILM COATED ORAL at 05:04

## 2024-01-30 RX ADMIN — PANTOPRAZOLE SODIUM 40 MG: 40 TABLET, DELAYED RELEASE ORAL at 08:39

## 2024-01-30 RX ADMIN — GLIPIZIDE 5 MG: 5 TABLET ORAL at 18:03

## 2024-01-30 RX ADMIN — LAMOTRIGINE 100 MG: 100 TABLET ORAL at 08:40

## 2024-01-30 RX ADMIN — PREDNISONE 1 MG: 1 TABLET ORAL at 08:40

## 2024-01-30 RX ADMIN — SENNOSIDES AND DOCUSATE SODIUM 2 TABLET: 8.6; 5 TABLET ORAL at 21:37

## 2024-01-30 RX ADMIN — HYDROCORTISONE: 1 CREAM TOPICAL at 21:46

## 2024-01-30 RX ADMIN — PREGABALIN 100 MG: 100 CAPSULE ORAL at 21:39

## 2024-01-30 RX ADMIN — POLYETHYLENE GLYCOL 3350 17 G: 17 POWDER, FOR SOLUTION ORAL at 08:22

## 2024-01-30 RX ADMIN — MICONAZOLE NITRATE: 2 POWDER TOPICAL at 21:28

## 2024-01-30 RX ADMIN — CEFTRIAXONE 1 G: 1 INJECTION, POWDER, FOR SOLUTION INTRAMUSCULAR; INTRAVENOUS at 18:03

## 2024-01-30 RX ADMIN — Medication 2 MG: at 19:33

## 2024-01-30 RX ADMIN — NYSTATIN 500000 UNITS: 100000 SUSPENSION ORAL at 12:27

## 2024-01-30 RX ADMIN — BUSPIRONE HYDROCHLORIDE 7.5 MG: 7.5 TABLET ORAL at 21:39

## 2024-01-30 RX ADMIN — Medication 2 MG: at 08:37

## 2024-01-30 RX ADMIN — ONDANSETRON 4 MG: 2 INJECTION INTRAMUSCULAR; INTRAVENOUS at 08:23

## 2024-01-30 RX ADMIN — OXYCODONE HYDROCHLORIDE AND ACETAMINOPHEN 1 TABLET: 5; 325 TABLET ORAL at 05:04

## 2024-01-30 RX ADMIN — ENOXAPARIN SODIUM 40 MG: 100 INJECTION SUBCUTANEOUS at 18:03

## 2024-01-30 RX ADMIN — INSULIN ASPART 3 UNITS: 100 INJECTION, SOLUTION INTRAVENOUS; SUBCUTANEOUS at 12:24

## 2024-01-30 RX ADMIN — POLYETHYLENE GLYCOL 3350 17 G: 17 POWDER, FOR SOLUTION ORAL at 21:38

## 2024-01-30 RX ADMIN — GLIPIZIDE 10 MG: 10 TABLET ORAL at 08:39

## 2024-01-30 RX ADMIN — OXYCODONE HYDROCHLORIDE AND ACETAMINOPHEN 1 TABLET: 5; 325 TABLET ORAL at 19:33

## 2024-01-30 RX ADMIN — OXYCODONE HYDROCHLORIDE AND ACETAMINOPHEN 1 TABLET: 5; 325 TABLET ORAL at 11:42

## 2024-01-30 RX ADMIN — NYSTATIN 500000 UNITS: 100000 SUSPENSION ORAL at 18:02

## 2024-01-30 RX ADMIN — NYSTATIN 500000 UNITS: 100000 SUSPENSION ORAL at 09:30

## 2024-01-30 RX ADMIN — LAMOTRIGINE 100 MG: 100 TABLET ORAL at 21:39

## 2024-01-30 RX ADMIN — REMDESIVIR 100 MG: 100 INJECTION, POWDER, LYOPHILIZED, FOR SOLUTION INTRAVENOUS at 09:29

## 2024-01-30 RX ADMIN — SODIUM CHLORIDE 50 ML: 9 INJECTION, SOLUTION INTRAVENOUS at 09:29

## 2024-01-30 RX ADMIN — PREDNISONE 5 MG: 5 TABLET ORAL at 08:39

## 2024-01-30 RX ADMIN — NYSTATIN 500000 UNITS: 100000 SUSPENSION ORAL at 21:39

## 2024-01-30 RX ADMIN — DULOXETINE HYDROCHLORIDE 60 MG: 30 CAPSULE, DELAYED RELEASE ORAL at 08:40

## 2024-01-30 RX ADMIN — HYDROXYZINE HYDROCHLORIDE 25 MG: 25 TABLET, FILM COATED ORAL at 19:33

## 2024-01-30 RX ADMIN — PREGABALIN 100 MG: 100 CAPSULE ORAL at 08:38

## 2024-01-30 RX ADMIN — TRIAMTERENE AND HYDROCHLOROTHIAZIDE 1 CAPSULE: 37.5; 25 CAPSULE ORAL at 08:37

## 2024-01-30 RX ADMIN — EMPAGLIFLOZIN 25 MG: 25 TABLET, FILM COATED ORAL at 08:40

## 2024-01-30 RX ADMIN — BUSPIRONE HYDROCHLORIDE 7.5 MG: 7.5 TABLET ORAL at 08:39

## 2024-01-30 RX ADMIN — INSULIN ASPART 3 UNITS: 100 INJECTION, SOLUTION INTRAVENOUS; SUBCUTANEOUS at 07:22

## 2024-01-30 ASSESSMENT — ACTIVITIES OF DAILY LIVING (ADL)
ADLS_ACUITY_SCORE: 42
ADLS_ACUITY_SCORE: 42
ADLS_ACUITY_SCORE: 44
ADLS_ACUITY_SCORE: 42
ADLS_ACUITY_SCORE: 44
ADLS_ACUITY_SCORE: 38
ADLS_ACUITY_SCORE: 42
ADLS_ACUITY_SCORE: 42
ADLS_ACUITY_SCORE: 38
ADLS_ACUITY_SCORE: 42
ADLS_ACUITY_SCORE: 38
ADLS_ACUITY_SCORE: 42

## 2024-01-30 NOTE — TELEPHONE ENCOUNTER
[facility] Call Center    Phone Message    May a detailed message be left on voicemail: no     Reason for Call: Other: Home care nurse Abbi is calling back to speak with the nurse about a message that was left yesterday. Please call back at [phone].     Action Taken: Other: [facility]    Travel Screening: Not Applicable

## 2024-01-30 NOTE — PROGRESS NOTES
[facility]    Medicine Progress Note - Hospitalist Service    Date of Admission:  1/28/2024    Assessment & Plan   83-year-old female with a known recent diagnosis of SARS COVID returns to the hospital with increasing weakness, urinary incontinence, and now hypoxia.  She has been started on remdesivir and steroids.  Patient developed significant urinary retention which required urology consultation and Teran catheter placement.  This will be left in place at discharge with outpatient voiding trial due to difficulty of placement.     Sars-Cov-2 infection/acute hypoxic respiratory failure  Hypoxia noted with acute respiratory failure.   Given baseline prednisone, will choose dexamethasone for therapeutic.   Remdesivir initiated  Suspect right upper lobe patchy opacity is viral and patient is already on ceftriaxone  Broadened antibiotics if clinically indicated     Type 2 diabetes  Continue oral meds  Correction insulin  As anticipated now hyperglycemic  Do not think she needs to be on both prednisone and Dex  Discontinue prednisone  Lantus  Increased correction insulin  Would add scheduled Premeal if remain elevated     Oral candidiasis  Nystatin swish/swallow ordered     Suspected acute cystitis/abnormal UA  Continue ceftriaxone for now     Microcytic anemia  Repeat CBC   Denies melena.   Has hemorrhoids with occasional BRBPR.   Clinically monitor and transfuse if indicated.     Right pleural effusion  Trace size.   Clinically monitor with hypoxia     Meningioma  Presumed diagnosis on imaging, unchanged from prior     Right heel wound  Wound care consult  Asked nursing to get appropriate footwear on the patient to alleviate pressure    Severe hearing impairment/anxiety  Makes communication difficult especially since needing mask  Son is available at times in person and via phone and is very helpful    Constipation  Twice daily MiraLAX  Twice daily senna  As needed suppository           "  Diet: Regular Diet Adult    DVT Prophylaxis: Enoxaparin (Lovenox) SQ  Teran Catheter: PRESENT, indication: Retention  Lines: None     Cardiac Monitoring: None  Code Status: No CPR- Do NOT Intubate      Clinically Significant Risk Factors              # Hypoalbuminemia: Lowest albumin = 3.4 g/dL at 1/28/2024  1:29 PM, will monitor as appropriate     # Hypertension: Noted on problem list       # DMII: A1C = 8.5 % (Ref range: <5.7 %) within past 6 months, PRESENT ON ADMISSION  # Obesity: Estimated body mass index is 31.76 kg/m  as calculated from the following:    Height as of 1/24/24: 1.626 m (5' 4\").    Weight as of 1/24/24: 83.9 kg (185 lb)., PRESENT ON ADMISSION     # Financial/Environmental Concerns: none         Disposition Plan     Expected Discharge Date: 01/31/2024      Destination: home with help/services              Nagi Kaur MD  Hospitalist Service  [facility]  Securely message with [employer] (more info)  Text page via [employer] Paging/Directory   ______________________________________________________________________    Interval History   Patient is very anxious and has many questions.  Currently concerned about the Teran catheter and the plan moving forward.  Explained to her and her son that this will be left in place and she will have an outpatient voiding trial.  I anticipate she will need a TCU.  Her son does support that.  Blood sugars have been quite high and she is still requiring oxygen.  We are attempting to wean O2 and will increase treatment for diabetes while on steroids.    Physical Exam   Vital Signs: Temp: 97.6  F (36.4  C) Temp src: Oral BP: (!) 150/67 Pulse: 66   Resp: 20 SpO2: 91 % O2 Device: Nasal cannula Oxygen Delivery: 2 LPM  Weight: 0 lbs 0 oz    General Appearance: No apparent distress  Respiratory: Fairly clear with occasional crackles in the bases  Cardiovascular: Regular rate and rhythm  GI: Abdomen is obese but soft diffusely tender with positive " bowel sounds  Skin: Patient has a heel ulcer left heel  Other: Good eye contact but anxious appearing        52 MINUTES SPENT BY ME on the date of service doing chart review, history, exam, documentation & further activities per the note.      Data     I have personally reviewed the following data over the past 24 hrs:    5.5  \   7.7 (L)   / 233     140 101 28.1 (H) /  255 (H)   4.7 28 0.92 \     Procal: N/A CRP: 59.70 (H) Lactic Acid: N/A       INR:  N/A PTT:  N/A   D-dimer:  0.77 (H) Fibrinogen:  N/A       Imaging results reviewed over the past 24 hrs:   No results found for this or any previous visit (from the past 24 hour(s)).

## 2024-01-30 NOTE — PROGRESS NOTES
"   01/30/24 1035   Appointment Info   Signing Clinician's Name / Credentials (PT) Claudine Maciel, PT, DPT   Rehab Comments (PT) Co-treat with OT; Pt uses communication board   Living Environment   People in Home alone  (has a son living near by)   Living Environment Comments per chart review - pt had been getting 12 hrs of PCA services a week, however recently declined PCA hours. Pt unable to answer subjective questions during session, likely d/t increased feelings of anxiousness   Self-Care   Fall history within last six months yes   Number of times patient has fallen within last six months 5  (pt reported at least 5 falls)   General Information   Onset of Illness/Injury or Date of Surgery 01/28/24   Referring Physician Donald Uribe MD   Patient/Family Therapy Goals Statement (PT) Return to Home   Pertinent History of Current Problem (include personal factors and/or comorbidities that impact the POC) Per Chart Review H&P-\"83 year old female who has  PMHx:meniere's disease, IBS, CKD 3, HLD, HTN, chronic pain, T2DM  Chief Complaint: weakness\"   Existing Precautions/Restrictions fall   Strength (Manual Muscle Testing)   Strength (Manual Muscle Testing) Deficits observed during functional mobility   Bed Mobility   Bed Mobility supine-sit   Supine-Sit [place] (Bed Mobility) supervision;verbal cues;maximum assist (25% patient effort);2 person assist   Impairments Contributing to Impaired Bed Mobility decreased strength   Transfers   Transfers sit-stand transfer   Impairments Contributing to Impaired Transfers decreased strength   Sit-Stand Transfer   Sit-Stand [place] (Transfers) supervision;verbal cues;maximum assist (25% patient effort);2 person assist   Assistive Device (Sit-Stand Transfers) walker, front-wheeled   Gait/Stairs (Locomotion)   Comment, (Gait/Stairs) Unable to assess at initial evaluation   Clinical Impression   Criteria for Skilled Therapeutic Intervention Yes, treatment indicated   PT " Diagnosis (PT) Impaired Functional Mobility   Influenced by the following impairments weakness, impaired balance   Functional limitations due to impairments gait, transfers   Clinical Presentation (PT Evaluation Complexity) stable   Clinical Presentation Rationale pt presents as medically diagnosed   Clinical Decision Making (Complexity) low complexity   Planned Therapy Interventions (PT) balance training;bed mobility training;gait training;home exercise program;ROM (range of motion);strengthening;transfer training;stair training   Risk & Benefits of therapy have been explained evaluation/treatment results reviewed;patient   PT Total Evaluation Time   PT Eval, Low Complexity Minutes (21247) 10   Physical Therapy Goals   PT Frequency 5x/week   PT Predicted Duration/Target Date for Goal Attainment 02/07/24   PT Goals Transfers;Gait   PT: Transfers Supervision/stand-by assist;Sit to/from stand;Assistive device;Bed to/from chair   PT: Gait Supervision/stand-by assist;Rolling walker;10 feet   Interventions   Interventions Quick Adds Therapeutic Activity   Therapeutic Activity   Therapeutic Activities: dynamic activities to improve functional performance Minutes (05417) 15   Symptoms Noted During/After Treatment Fatigue   Treatment Detail/Skilled Intervention Multiple sit to/from stands: cueing for safety/hand placement on stable surfaceFWW management, Min to Mod A x1-2; Standing balance: cueing for safety/posture, Min A to Mod A x1-2; Pivot transfers bed to commode, commode to chair: cueing for safety/technique/posture/use of FWW, Min A x1-2; Cueing for deep breathing throughout session for management of O2 sats. O2 sats above 90 throughout session with intermittent cueing. Increased time for management of lines/tubes and room set up for safe transfers   PT Discharge Planning   PT Plan pivot transfers as able, LE therex, Sit to/from stands   PT Discharge Recommendation (DC Rec) Transitional Care Facility   PT Rationale  for DC Rec pt requires assist x1-2 for functional mobility and currently is living alone. TCU for improving strength/balance/activity tolerance   PT Brief overview of current status Min to Mod A x1-2 for sit to/from stand transfers & pivot transfers   Total Session Time   Timed Code Treatment Minutes 15   Total Session Time (sum of timed and untimed services) 25

## 2024-01-30 NOTE — TELEPHONE ENCOUNTER
Walk-in hearing aid services on 1/29/24.  The patient's son reported her left hearing aid fell apart after the patient fell asleep with it on.  The hearing aid and earmold were cleaned and the earmold tubing was replaced.  A listening check found the hearing aid to be working properly and it was returned to the patient's son.  He also asked about repaired her back up hearing aid (very old Phonak BTE).  The tonehook was missing from that device and due to the age of the instrument parts are no longer available unless sent to [facility] for a full repair.

## 2024-01-30 NOTE — PLAN OF CARE
Goal Outcome Evaluation:  Disoriented to situation. VERY hard of hearing. Uses whiteboard to communicate, has one hearing aid but can only register lower voices/sounds, so whiteboard used for communication. Patient is extremely fixated on failed nolasco attempts in ED, was very traumatic and painful for her. Nolasco placed by urology - 18f Coude used. Draining clear, yellow urine. 850mL out initially. Recommended that it stays in place for 1-2 weeks minimum. [facility] was mistakenly consulted while patient was in ED, but patient is a [facility] patient, so they will need to be consulted. Q2 turns to prevent further skin breakdown. WOC did consult, orders placed. Percocet given for pain (patient takes at home), hydroxyzine for anxiety did seem to help.     Son, Davie, at bedside for most of shift. IV Rocephin given. ACHS blood sugars. 4L O2 via NC. Alarms on.

## 2024-01-30 NOTE — PLAN OF CARE
"Pt A&Ox3 disoriented to situation. VSS, 1 LPM stating around 94%. Pt very hard of hearing. IV patent & SL. Tolerating diet. Pt care ongoing, call light within reach, bed in lowest position, alarms on for safety. Discharge pending.         Problem: Adult Inpatient Plan of Care  Goal: Plan of Care Review  Description: The Plan of Care Review/Shift note should be completed every shift.  The Outcome Evaluation is a brief statement about your assessment that the patient is improving, declining, or no change.  This information will be displayed automatically on your shift  note.  Outcome: Progressing  Goal: Patient-Specific Goal (Individualized)  Description: You can add care plan individualizations to a care plan. Examples of Individualization might be:  \"Parent requests to be called daily at 9am for status\", \"I have a hard time hearing out of my right ear\", or \"Do not touch me to wake me up as it startles  me\".  Outcome: Progressing  Goal: Absence of Hospital-Acquired Illness or Injury  Outcome: Progressing  Intervention: Identify and Manage Fall Risk  Recent Flowsheet Documentation  Taken 1/30/2024 0104 by Nathaly Lazo RN  Safety Promotion/Fall Prevention: safety round/check completed  Taken 1/29/2024 2100 by Nathaly Lazo RN  Safety Promotion/Fall Prevention: safety round/check completed  Intervention: Prevent and Manage VTE (Venous Thromboembolism) Risk  Recent Flowsheet Documentation  Taken 1/30/2024 0104 by Nathaly Lazo RN  VTE Prevention/Management: SCDs (sequential compression devices) off  Taken 1/29/2024 2100 by Nathaly Lazo RN  VTE Prevention/Management: SCDs (sequential compression devices) off  Intervention: Prevent Infection  Recent Flowsheet Documentation  Taken 1/30/2024 0104 by Nathaly Lazo RN  Infection Prevention:   hand hygiene promoted   rest/sleep promoted   single patient room provided  Taken 1/29/2024 2100 by Nathaly Lazo, " RN  Infection Prevention:   hand hygiene promoted   rest/sleep promoted   single patient room provided  Goal: Optimal Comfort and Wellbeing  Outcome: Progressing  Goal: Readiness for Transition of Care  Outcome: Progressing     Problem: Risk for Delirium  Goal: Optimal Coping  Outcome: Progressing  Goal: Improved Behavioral Control  Outcome: Progressing  Intervention: Minimize Safety Risk  Recent Flowsheet Documentation  Taken 1/30/2024 0104 by Nathaly Lazo, RN  Enhanced Safety Measures:   room near unit station   review medications for side effects with activity  Taken 1/29/2024 2100 by Nathaly Lazo RN  Enhanced Safety Measures:   room near unit station   review medications for side effects with activity  Goal: Improved Attention and Thought Clarity  Outcome: Progressing  Goal: Improved Sleep  Outcome: Progressing     Problem: Gas Exchange Impaired  Goal: Optimal Gas Exchange  Outcome: Progressing

## 2024-01-30 NOTE — PROGRESS NOTES
01/30/24 1045   Appointment Info   Signing Clinician's Name / Credentials (OT) Verito Eldridge, OTR/L   Living Environment   People in Home alone  (has supportive son nearby)   Living Environment Comments pt has HHA several hours per week.  pt too anxious during session to answer questions about home.  Will need to address those questions in future appointments   Self-Care   Usual Activity Tolerance fair   Current Activity Tolerance poor   General Information   Onset of Illness/Injury or Date of Surgery 01/28/24   Referring Physician Nagi Kaur   Patient/Family Therapy Goal Statement (OT) I don't want to fall!   Existing Precautions/Restrictions fall;oxygen therapy device and L/min;other (see comments)  (heel wound on left)   Cognitive Status Examination   Affect/Mental Status (Cognitive) anxious   Cognitive Status Comments pt able to follow directions-using white board to communicate since her hearing aids don't work well.  further cog assessment needed   Pain Assessment   Patient Currently in Pain No  (Pt has pain everywhere-RN premedicated her for therapy)   Range of Motion Comprehensive   General Range of Motion no range of motion deficits identified   Strength Comprehensive (MMT)   General Manual Muscle Testing (MMT) Assessment no strength deficits identified   Bed Mobility   Comment (Bed Mobility) max   Transfers   Transfer Comments max   Balance   Balance Comments sitting balance SBA, standing balance Max with FWW   Clinical Impression   Criteria for Skilled Therapeutic Interventions Met (OT) Yes, treatment indicated   OT Diagnosis decreased ind with adls   OT Problem List-Impairments impacting ADL activity tolerance impaired;cognition;mobility;strength;pain   Assessment of Occupational Performance 3-5 Performance Deficits   Identified Performance Deficits dressing, toileting, transfers, bed mobility, cognition   Planned Therapy Interventions (OT) ADL retraining;bed mobility training;transfer  training;cognition   Clinical Decision Making Complexity (OT) detailed assessment/moderate complexity   Risk & Benefits of therapy have been explained evaluation/treatment results reviewed;care plan/treatment goals reviewed;risks/benefits reviewed;current/potential barriers reviewed;participants voiced agreement with care plan;participants included;patient   OT Total Evaluation Time   OT Eval, Moderate Complexity Minutes (14682) 15   OT Goals   Therapy Frequency (OT) 5 times/week   OT Predicted Duration/Target Date for Goal Attainment 02/06/24   OT Goals Lower Body Dressing;Bed Mobility;Toilet Transfer/Toileting;Cognition;Hygiene/Grooming   OT: Hygiene/Grooming modified independent;from wheelchair   OT: Lower Body Dressing Moderate assist   OT: Bed Mobility Minimal assist;supine to/from sitting;rolling;bridging   OT: Toilet Transfer/Toileting Minimal assist;toilet transfer;cleaning and garment management   OT: Cognitive Patient/caregiver will verbalize understanding of cognitive assessment results/recommendations as needed for safe discharge planning   Interventions   Interventions Quick Adds Self-Care/Home Management   Self-Care/Home Management   Self-Care/Home Mgmt/ADL, Compensatory, Meal Prep Minutes (42877) 20   Symptoms Noted During/After Treatment (Meal Preparation/Planning Training) fatigue;behavioral stress signs   Treatment Detail/Skilled Intervention Pt very anxious-hypervigilant about every little detail in the room. She agreed to work in therapy. She needed continuous encouragement and reassurance through out session.  On 2 liters oxygen.  O2 sats 90 or better with all activities.  Taught PLB mostly for anxiety.  Taught her how to place hands safely to get oob and to transfer from bed, to and from bedside commode and to chair.  She needed max of one for toileting task and min of another to stand with the walker.  Pt can do more than she thinks she can. She is very fearful of falling. Explained that  sitting n the chair is good for her lungs and she appreciated that. Chair alarm on, call light in reach.   OT Discharge Planning   OT Plan adls and func mob, cog   OT Discharge Recommendation (DC Rec) (S)  Transitional Care Facility   OT Rationale for DC Rec pt needs much assist with all adls.  Will benefit from OT to increase ADL independence.   OT Brief overview of current status Max assist with all adls.  Very anxious   Total Session Time   Timed Code Treatment Minutes 20   Total Session Time (sum of timed and untimed services) 35

## 2024-01-30 NOTE — TELEPHONE ENCOUNTER
Called Tonya's nurse back to discuss the medication change that Tonya wanted.    Tonya was prescribed Vesicare on 12/14 and has found it ineffective and would like to switch back to her oxybutynin. She has been taking her old prescription of the oxybutynin and will run out in a couple of days.    Pended her previous oxybutynin prescription and routed to Dr. Foreman for approval.

## 2024-01-30 NOTE — PROGRESS NOTES
Care Management Follow Up    Length of Stay (days): 2    Expected Discharge Date: 01/31/2024     Concerns to be Addressed: care coordination/care conferences, discharge planning  Coordination with current home care providers vs. TCU placement needed at discharge  Patient plan of care discussed at interdisciplinary rounds: Yes    Anticipated Discharge Disposition: Home Care vs TCU      Anticipated Discharge Services: None  Anticipated Discharge DME: None    Patient/family educated on Medicare website which has current facility and service quality ratings: yes  Education Provided on the Discharge Plan: Yes (Assessment and service coordination process explained)  Patient/Family in Agreement with the Plan: other (see comments)    Referrals Placed by CM/SW: Homecare (Current in-home service providers notified of admission and likely continued need at time of discharge), post acute care facilities   Private pay costs discussed: Not applicable    Additional Information:  SW met with patient's son, Davie, outside of patient's room due to precautions.  RAJ discussed recommendation for TCU from both PT and OT.  Davie reports patient has been to TCU in the past, she would prefer to return home, however they are willing to consider TCU as an option if she needs it.  Pt's son, Davie is working with pt's community  to determine if necessary supports can be coordinated for patient to return home.  Davie in agreement with sending preliminary TCU referrals to facilities in [place].  SW reviewed star ratings with Davie.  Referrals sent.     CARISSA Reyez

## 2024-01-30 NOTE — PLAN OF CARE
Goal Outcome Evaluation:    2L O2 NC. Able to get patient up to chair, up for 3 hours, complained of pain, got back to bed. Encouraged patient to be up for dinner. High levels of anxiety, PRN valium given x1. Q2 turns to prevent further breakdown on sacrum. Heels floated. Percocet given for pain. Teran patent, draining clear yellow urine. Whiteboard utilized for VSS. Alarms on. Call light within reach.

## 2024-01-31 ENCOUNTER — APPOINTMENT (OUTPATIENT)
Dept: OCCUPATIONAL THERAPY | Facility: CLINIC | Age: 84
DRG: 177 | End: 2024-01-31
Payer: MEDICARE

## 2024-01-31 LAB
ANION GAP SERPL CALCULATED.3IONS-SCNC: 9 MMOL/L (ref 7–15)
BUN SERPL-MCNC: 29.9 MG/DL (ref 8–23)
CALCIUM SERPL-MCNC: 9.5 MG/DL (ref 8.8–10.2)
CHLORIDE SERPL-SCNC: 100 MMOL/L (ref 98–107)
CREAT SERPL-MCNC: 0.92 MG/DL (ref 0.51–0.95)
CRP SERPL-MCNC: 32.8 MG/L
D DIMER PPP FEU-MCNC: 0.66 UG/ML FEU (ref 0–0.5)
DEPRECATED HCO3 PLAS-SCNC: 28 MMOL/L (ref 22–29)
EGFRCR SERPLBLD CKD-EPI 2021: 62 ML/MIN/1.73M2
ERYTHROCYTE [DISTWIDTH] IN BLOOD BY AUTOMATED COUNT: 20 % (ref 10–15)
GLUCOSE BLDC GLUCOMTR-MCNC: 202 MG/DL (ref 70–99)
GLUCOSE BLDC GLUCOMTR-MCNC: 213 MG/DL (ref 70–99)
GLUCOSE BLDC GLUCOMTR-MCNC: 238 MG/DL (ref 70–99)
GLUCOSE BLDC GLUCOMTR-MCNC: 266 MG/DL (ref 70–99)
GLUCOSE BLDC GLUCOMTR-MCNC: 269 MG/DL (ref 70–99)
GLUCOSE BLDC GLUCOMTR-MCNC: 390 MG/DL (ref 70–99)
GLUCOSE SERPL-MCNC: 292 MG/DL (ref 70–99)
HCT VFR BLD AUTO: 31.1 % (ref 35–47)
HGB BLD-MCNC: 8.3 G/DL (ref 11.7–15.7)
MCH RBC QN AUTO: 20.2 PG (ref 26.5–33)
MCHC RBC AUTO-ENTMCNC: 26.7 G/DL (ref 31.5–36.5)
MCV RBC AUTO: 76 FL (ref 78–100)
PLATELET # BLD AUTO: 284 10E3/UL (ref 150–450)
POTASSIUM SERPL-SCNC: 4.5 MMOL/L (ref 3.4–5.3)
RBC # BLD AUTO: 4.11 10E6/UL (ref 3.8–5.2)
SODIUM SERPL-SCNC: 137 MMOL/L (ref 135–145)
WBC # BLD AUTO: 6.3 10E3/UL (ref 4–11)

## 2024-01-31 PROCEDURE — 250N000013 HC RX MED GY IP 250 OP 250 PS 637: Performed by: EMERGENCY MEDICINE

## 2024-01-31 PROCEDURE — 86140 C-REACTIVE PROTEIN: CPT | Performed by: FAMILY MEDICINE

## 2024-01-31 PROCEDURE — 120N000001 HC R&B MED SURG/OB

## 2024-01-31 PROCEDURE — 85027 COMPLETE CBC AUTOMATED: CPT | Performed by: FAMILY MEDICINE

## 2024-01-31 PROCEDURE — 250N000011 HC RX IP 250 OP 636: Performed by: HOSPITALIST

## 2024-01-31 PROCEDURE — 97535 SELF CARE MNGMENT TRAINING: CPT | Mod: GO

## 2024-01-31 PROCEDURE — 258N000003 HC RX IP 258 OP 636: Performed by: FAMILY MEDICINE

## 2024-01-31 PROCEDURE — 36415 COLL VENOUS BLD VENIPUNCTURE: CPT | Performed by: FAMILY MEDICINE

## 2024-01-31 PROCEDURE — 99232 SBSQ HOSP IP/OBS MODERATE 35: CPT | Performed by: EMERGENCY MEDICINE

## 2024-01-31 PROCEDURE — 250N000013 HC RX MED GY IP 250 OP 250 PS 637: Performed by: HOSPITALIST

## 2024-01-31 PROCEDURE — 85379 FIBRIN DEGRADATION QUANT: CPT | Performed by: FAMILY MEDICINE

## 2024-01-31 PROCEDURE — 250N000013 HC RX MED GY IP 250 OP 250 PS 637: Performed by: FAMILY MEDICINE

## 2024-01-31 PROCEDURE — 250N000012 HC RX MED GY IP 250 OP 636 PS 637: Performed by: HOSPITALIST

## 2024-01-31 PROCEDURE — 250N000011 HC RX IP 250 OP 636: Mod: JZ | Performed by: FAMILY MEDICINE

## 2024-01-31 PROCEDURE — 80048 BASIC METABOLIC PNL TOTAL CA: CPT | Performed by: FAMILY MEDICINE

## 2024-01-31 RX ORDER — LACTULOSE 10 G/15ML
20 SOLUTION ORAL ONCE
Status: COMPLETED | OUTPATIENT
Start: 2024-01-31 | End: 2024-01-31

## 2024-01-31 RX ADMIN — ENOXAPARIN SODIUM 40 MG: 100 INJECTION SUBCUTANEOUS at 16:50

## 2024-01-31 RX ADMIN — PANTOPRAZOLE SODIUM 40 MG: 40 TABLET, DELAYED RELEASE ORAL at 09:43

## 2024-01-31 RX ADMIN — TRIAMTERENE AND HYDROCHLOROTHIAZIDE 1 CAPSULE: 37.5; 25 CAPSULE ORAL at 09:43

## 2024-01-31 RX ADMIN — HYDROCORTISONE: 1 CREAM TOPICAL at 09:44

## 2024-01-31 RX ADMIN — SENNOSIDES AND DOCUSATE SODIUM 2 TABLET: 8.6; 5 TABLET ORAL at 09:42

## 2024-01-31 RX ADMIN — POLYETHYLENE GLYCOL 3350 17 G: 17 POWDER, FOR SOLUTION ORAL at 09:41

## 2024-01-31 RX ADMIN — GLIPIZIDE 5 MG: 5 TABLET ORAL at 16:50

## 2024-01-31 RX ADMIN — CEFTRIAXONE 1 G: 1 INJECTION, POWDER, FOR SOLUTION INTRAMUSCULAR; INTRAVENOUS at 16:41

## 2024-01-31 RX ADMIN — Medication 2 MG: at 06:51

## 2024-01-31 RX ADMIN — GLIPIZIDE 10 MG: 10 TABLET ORAL at 09:44

## 2024-01-31 RX ADMIN — LAMOTRIGINE 100 MG: 100 TABLET ORAL at 09:43

## 2024-01-31 RX ADMIN — BUSPIRONE HYDROCHLORIDE 7.5 MG: 7.5 TABLET ORAL at 20:14

## 2024-01-31 RX ADMIN — PREGABALIN 100 MG: 100 CAPSULE ORAL at 20:14

## 2024-01-31 RX ADMIN — OXYCODONE HYDROCHLORIDE AND ACETAMINOPHEN 1 TABLET: 5; 325 TABLET ORAL at 12:28

## 2024-01-31 RX ADMIN — HYDROXYZINE HYDROCHLORIDE 25 MG: 25 TABLET, FILM COATED ORAL at 06:51

## 2024-01-31 RX ADMIN — SODIUM CHLORIDE 50 ML: 9 INJECTION, SOLUTION INTRAVENOUS at 09:42

## 2024-01-31 RX ADMIN — SENNOSIDES AND DOCUSATE SODIUM 2 TABLET: 8.6; 5 TABLET ORAL at 20:14

## 2024-01-31 RX ADMIN — INSULIN GLARGINE 10 UNITS: 100 INJECTION, SOLUTION SUBCUTANEOUS at 06:51

## 2024-01-31 RX ADMIN — POLYETHYLENE GLYCOL 3350 17 G: 17 POWDER, FOR SOLUTION ORAL at 20:14

## 2024-01-31 RX ADMIN — OXYCODONE HYDROCHLORIDE AND ACETAMINOPHEN 1 TABLET: 5; 325 TABLET ORAL at 22:22

## 2024-01-31 RX ADMIN — BUSPIRONE HYDROCHLORIDE 7.5 MG: 7.5 TABLET ORAL at 09:43

## 2024-01-31 RX ADMIN — DULOXETINE HYDROCHLORIDE 60 MG: 30 CAPSULE, DELAYED RELEASE ORAL at 09:42

## 2024-01-31 RX ADMIN — REMDESIVIR 100 MG: 100 INJECTION, POWDER, LYOPHILIZED, FOR SOLUTION INTRAVENOUS at 09:41

## 2024-01-31 RX ADMIN — HYDROCORTISONE: 1 CREAM TOPICAL at 20:15

## 2024-01-31 RX ADMIN — NYSTATIN 500000 UNITS: 100000 SUSPENSION ORAL at 09:42

## 2024-01-31 RX ADMIN — OXYCODONE HYDROCHLORIDE AND ACETAMINOPHEN 1 TABLET: 5; 325 TABLET ORAL at 07:03

## 2024-01-31 RX ADMIN — LAMOTRIGINE 100 MG: 100 TABLET ORAL at 20:14

## 2024-01-31 RX ADMIN — EMPAGLIFLOZIN 25 MG: 25 TABLET, FILM COATED ORAL at 09:43

## 2024-01-31 RX ADMIN — NYSTATIN 500000 UNITS: 100000 SUSPENSION ORAL at 16:43

## 2024-01-31 RX ADMIN — LACTULOSE 20 G: 10 SOLUTION ORAL at 16:47

## 2024-01-31 RX ADMIN — OXYCODONE HYDROCHLORIDE AND ACETAMINOPHEN 1 TABLET: 5; 325 TABLET ORAL at 16:59

## 2024-01-31 RX ADMIN — DEXAMETHASONE 6 MG: 2 TABLET ORAL at 12:29

## 2024-01-31 RX ADMIN — PREGABALIN 100 MG: 100 CAPSULE ORAL at 09:42

## 2024-01-31 ASSESSMENT — ACTIVITIES OF DAILY LIVING (ADL)
ADLS_ACUITY_SCORE: 41
ADLS_ACUITY_SCORE: 42
ADLS_ACUITY_SCORE: 41
ADLS_ACUITY_SCORE: 42
ADLS_ACUITY_SCORE: 41
ADLS_ACUITY_SCORE: 41

## 2024-01-31 NOTE — PLAN OF CARE
Pt A&Ox3 disoriented to situation. VSS, 1 LPM stating around 94%. Pt very hard of hearing. IV patent & SL. Tolerating diet. Pt care ongoing, call light within reach, bed in lowest position, alarms on for safety. Discharge pending.    paged MD at 0230 d/t pt elevated BG of 390. No new orders.      Problem: Adult Inpatient Plan of Care  Goal: Readiness for Transition of Care  Outcome: Progressing     Problem: Risk for Delirium  Goal: Improved Attention and Thought Clarity  Outcome: Progressing  Goal: Improved Sleep  Outcome: Progressing

## 2024-01-31 NOTE — PLAN OF CARE
Pt alert and oriented and able to verbalize needs. Pt got up with therapy after breakfast into recliner. Pt having low back and sacrum pain. PRN percocet given and effective. Pt went back in bed after lunch and pillows placed for comfort. Pt continues to require 1L via NC.   Problem: Adult Inpatient Plan of Care  Goal: Optimal Comfort and Wellbeing  Outcome: Progressing  Intervention: Monitor Pain and Promote Comfort  Recent Flowsheet Documentation  Taken 1/31/2024 1228 by Terri Blevins, RN  Pain Management Interventions: medication (see MAR)     Problem: Risk for Delirium  Goal: Improved Attention and Thought Clarity  Outcome: Progressing     Problem: Gas Exchange Impaired  Goal: Optimal Gas Exchange  Outcome: Progressing

## 2024-01-31 NOTE — PROGRESS NOTES
[facility] MEDICINE PROGRESS NOTE      Summary: 83 year old female into [facility] on 1/28/2024 after  Presenting for weakness.     ER diagnostics showed a right upper lobe infilitrate.  She was covid  Positive on 1/25. 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  She was put on remdesivir along with  Dexamethasone.      On interview today with the white board she has great concern  Regarding her constipation.  She last had a bm 4 days ago.  She feels bloated.  She is anxious.     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  Disposition:  likely TCU discharge though will recheck    With her tomorrow        Yolanda Alaniz MD  [facility] Medicine  [facility]  Phone: #[phone]  Securely message with the Vocera Web Console (learn more here)  Text page via [employer] Paging/Directory     Interval History/Subjective: interviewed with the white board;       Physical Exam/Objective:  Temp:  [97.7  F (36.5  C)] 97.7  F (36.5  C)  Pulse:  [62-65] 65  Resp:  [18-20] 20  BP: (122-149)/(56-68) 122/56  FiO2 (%):  [2 %] 2 %  SpO2:  [91 %-94 %] 94 %  There is no height or weight on file to calculate BMI.    GENERAL:  Alert, watching TV;    HEAD:  Normocephalic, without obvious abnormality, atraumatic   EYES:  PERRL, conjunctiva/corneas clear, no scleral icterus, EOM's intact   NOSE:  Nares normal, septum midline, mucosa normal, no drainage   THROAT: Lips, mucosa, and tongue normal; teeth and gums normal, mouth moist   NECK: Supple, symmetrical, trachea midline   BACK:   Symmetric, no curvature, ROM normal   LUNGS:   Clear to auscultation bilaterally, no rales, rhonchi, or wheezing, symmetric chest rise on inhalation, respirations unlabored   CHEST WALL:  No tenderness or deformity   HEART:  Regular rate and rhythm, S1 and S2 normal, no murmur, rub, or gallop    ABDOMEN:   Soft, non-tender, bowel sounds active all four quadrants, no masses, no organomegaly, no rebound or guarding   EXTREMITIES: Extremities normal, atraumatic, no cyanosis or edema    SKIN: Dry to touch, no exanthems in the visualized areas   NEURO: Alert, oriented x3, moves all four extremities freely   PSYCH: Cooperative, behavior is appropriate      Data reviewed today: I personally reviewed all new medications, labs, imaging/diagnostics reports over the past 24 hours. Pertinent findings include:    Imaging:   No results found for this or any previous visit (from the past 24 hour(s)).    Labs:      Medications:   Personally Reviewed.  Medications      busPIRone  7.5 mg Oral BID    cefTRIAXone  1 g Intravenous Q24H    dexAMETHasone  6 mg Oral Daily    DULoxetine  60 mg Oral QAM    empagliflozin  25 mg Oral QAM    enoxaparin ANTICOAGULANT  40 mg Subcutaneous Q24H    glipiZIDE  10 mg Oral Daily with breakfast    glipiZIDE  5 mg Oral Daily with supper    hydrocortisone   Topical BID    insulin aspart  1-10 Units Subcutaneous TID AC    insulin aspart  1-7 Units Subcutaneous At Bedtime    insulin glargine  10 Units Subcutaneous QAM AC    lamoTRIgine  100 mg Oral BID    nystatin  500,000 Units Swish & Swallow 4x Daily    pantoprazole  40 mg Oral Daily    polyethylene glycol  17 g Oral BID    [Held by provider] predniSONE  1 mg Oral QAM    [Held by provider] predniSONE  5 mg Oral QAM    pregabalin  100 mg Oral BID    remdesivir  100 mg  Intravenous Q24H    And    sodium chloride 0.9%  50 mL Intravenous Q24H    senna-docusate  1 tablet Oral BID    Or    senna-docusate  2 tablet Oral BID    sodium chloride (PF)  3 mL Intracatheter Q8H    triamterene-HCTZ  1 capsule Oral QAM

## 2024-01-31 NOTE — PROGRESS NOTES
Care Management Follow Up    Length of Stay (days): 3    Expected Discharge Date: 02/01/2024     Concerns to be Addressed: care coordination/care conferences, discharge planning  Coordination with current home care providers vs. TCU placement needed at discharge  Patient plan of care discussed at interdisciplinary rounds: Yes    Anticipated Discharge Disposition: Home Care     Anticipated Discharge Services: None  Anticipated Discharge DME: None    Patient/family educated on Medicare website which has current facility and service quality ratings: no  Education Provided on the Discharge Plan: Yes (Assessment and service coordination process explained)  Patient/Family in Agreement with the Plan: other (see comments)    Referrals Placed by CM/SW: Homecare (Current in-home service providers notified of admission and likely continued need at time of discharge) and post acute care facilities   Private pay costs discussed: Not applicable    Additional Information:  RAJ reviewed chart.  Discharge plan pending--home with home care services vs TCU. Patient's son, Davie is main family contact for discharge planning.     RAJ following up on pending referrals:   [facility]: spoke with admissions, referral is still being reviewed  [facility]: Left voice mail requesting call back      Declined by:  [facility]  [facility]     3:09 PM  Pt accepted at [facility] in [facility].  RAJ called and left voice mail for Pt's son, Davie, requesting call back to discuss discharge planning. RAJ sent a message to [facility] inquiring when bed is available.    3:48 PM  SW received response back from [facility] stating they CANNOT accept patient and patient was incorrectly marked as accepted in [facility] by their admissions team.   RAJ spoke with Pt's son Davie to discuss.  Davie agrees to additional referrals being sent.  Davie does agree to final discharge plan as TCU.      Pt's community : [place]--[phone]    CARISSA Reyez

## 2024-02-01 ENCOUNTER — APPOINTMENT (OUTPATIENT)
Dept: RADIOLOGY | Facility: CLINIC | Age: 84
DRG: 177 | End: 2024-02-01
Attending: EMERGENCY MEDICINE
Payer: MEDICARE

## 2024-02-01 LAB
ANION GAP SERPL CALCULATED.3IONS-SCNC: 9 MMOL/L (ref 7–15)
BUN SERPL-MCNC: 27.4 MG/DL (ref 8–23)
CALCIUM SERPL-MCNC: 9.9 MG/DL (ref 8.8–10.2)
CHLORIDE SERPL-SCNC: 99 MMOL/L (ref 98–107)
CREAT SERPL-MCNC: 0.9 MG/DL (ref 0.51–0.95)
CRP SERPL-MCNC: 19.1 MG/L
DEPRECATED HCO3 PLAS-SCNC: 31 MMOL/L (ref 22–29)
EGFRCR SERPLBLD CKD-EPI 2021: 64 ML/MIN/1.73M2
ERYTHROCYTE [DISTWIDTH] IN BLOOD BY AUTOMATED COUNT: 20.3 % (ref 10–15)
GLUCOSE BLDC GLUCOMTR-MCNC: 133 MG/DL (ref 70–99)
GLUCOSE BLDC GLUCOMTR-MCNC: 161 MG/DL (ref 70–99)
GLUCOSE BLDC GLUCOMTR-MCNC: 198 MG/DL (ref 70–99)
GLUCOSE BLDC GLUCOMTR-MCNC: 66 MG/DL (ref 70–99)
GLUCOSE BLDC GLUCOMTR-MCNC: 68 MG/DL (ref 70–99)
GLUCOSE BLDC GLUCOMTR-MCNC: 71 MG/DL (ref 70–99)
GLUCOSE BLDC GLUCOMTR-MCNC: 76 MG/DL (ref 70–99)
GLUCOSE BLDC GLUCOMTR-MCNC: 80 MG/DL (ref 70–99)
GLUCOSE SERPL-MCNC: 105 MG/DL (ref 70–99)
HCT VFR BLD AUTO: 34.5 % (ref 35–47)
HGB BLD-MCNC: 9.2 G/DL (ref 11.7–15.7)
MCH RBC QN AUTO: 20.4 PG (ref 26.5–33)
MCHC RBC AUTO-ENTMCNC: 26.7 G/DL (ref 31.5–36.5)
MCV RBC AUTO: 77 FL (ref 78–100)
PLATELET # BLD AUTO: 329 10E3/UL (ref 150–450)
POTASSIUM SERPL-SCNC: 4.3 MMOL/L (ref 3.4–5.3)
RBC # BLD AUTO: 4.5 10E6/UL (ref 3.8–5.2)
SODIUM SERPL-SCNC: 139 MMOL/L (ref 135–145)
WBC # BLD AUTO: 7.9 10E3/UL (ref 4–11)

## 2024-02-01 PROCEDURE — 36415 COLL VENOUS BLD VENIPUNCTURE: CPT | Performed by: FAMILY MEDICINE

## 2024-02-01 PROCEDURE — 250N000011 HC RX IP 250 OP 636: Mod: JZ | Performed by: FAMILY MEDICINE

## 2024-02-01 PROCEDURE — 250N000013 HC RX MED GY IP 250 OP 250 PS 637: Performed by: HOSPITALIST

## 2024-02-01 PROCEDURE — 250N000012 HC RX MED GY IP 250 OP 636 PS 637: Performed by: HOSPITALIST

## 2024-02-01 PROCEDURE — 258N000003 HC RX IP 258 OP 636: Performed by: FAMILY MEDICINE

## 2024-02-01 PROCEDURE — 86140 C-REACTIVE PROTEIN: CPT | Performed by: FAMILY MEDICINE

## 2024-02-01 PROCEDURE — 74018 RADEX ABDOMEN 1 VIEW: CPT

## 2024-02-01 PROCEDURE — 250N000013 HC RX MED GY IP 250 OP 250 PS 637: Performed by: FAMILY MEDICINE

## 2024-02-01 PROCEDURE — 250N000011 HC RX IP 250 OP 636: Performed by: HOSPITALIST

## 2024-02-01 PROCEDURE — 120N000001 HC R&B MED SURG/OB

## 2024-02-01 PROCEDURE — 85027 COMPLETE CBC AUTOMATED: CPT | Performed by: FAMILY MEDICINE

## 2024-02-01 PROCEDURE — 80048 BASIC METABOLIC PNL TOTAL CA: CPT | Performed by: FAMILY MEDICINE

## 2024-02-01 PROCEDURE — 99232 SBSQ HOSP IP/OBS MODERATE 35: CPT | Performed by: EMERGENCY MEDICINE

## 2024-02-01 PROCEDURE — 250N000013 HC RX MED GY IP 250 OP 250 PS 637: Performed by: EMERGENCY MEDICINE

## 2024-02-01 RX ORDER — NYSTATIN 100000/ML
500000 SUSPENSION, ORAL (FINAL DOSE FORM) ORAL 2 TIMES DAILY
Status: DISCONTINUED | OUTPATIENT
Start: 2024-02-01 | End: 2024-02-08 | Stop reason: HOSPADM

## 2024-02-01 RX ORDER — POLYETHYLENE GLYCOL 3350 17 G/17G
119 POWDER, FOR SOLUTION ORAL ONCE
Status: COMPLETED | OUTPATIENT
Start: 2024-02-01 | End: 2024-02-01

## 2024-02-01 RX ORDER — HYDRALAZINE HYDROCHLORIDE 20 MG/ML
10 INJECTION INTRAMUSCULAR; INTRAVENOUS EVERY 6 HOURS PRN
Status: DISCONTINUED | OUTPATIENT
Start: 2024-02-01 | End: 2024-02-08 | Stop reason: HOSPADM

## 2024-02-01 RX ORDER — LACTULOSE 10 G/15ML
20 SOLUTION ORAL ONCE
Status: COMPLETED | OUTPATIENT
Start: 2024-02-01 | End: 2024-02-01

## 2024-02-01 RX ADMIN — DULOXETINE HYDROCHLORIDE 60 MG: 30 CAPSULE, DELAYED RELEASE ORAL at 09:27

## 2024-02-01 RX ADMIN — SENNOSIDES AND DOCUSATE SODIUM 2 TABLET: 8.6; 5 TABLET ORAL at 09:26

## 2024-02-01 RX ADMIN — ENOXAPARIN SODIUM 40 MG: 100 INJECTION SUBCUTANEOUS at 16:49

## 2024-02-01 RX ADMIN — HYDROCORTISONE: 1 CREAM TOPICAL at 09:29

## 2024-02-01 RX ADMIN — BISACODYL 10 MG: 10 SUPPOSITORY RECTAL at 10:46

## 2024-02-01 RX ADMIN — INSULIN GLARGINE 10 UNITS: 100 INJECTION, SOLUTION SUBCUTANEOUS at 10:21

## 2024-02-01 RX ADMIN — LACTULOSE 20 G: 10 SOLUTION ORAL at 13:41

## 2024-02-01 RX ADMIN — LAMOTRIGINE 100 MG: 100 TABLET ORAL at 09:27

## 2024-02-01 RX ADMIN — POLYETHYLENE GLYCOL 3350 17 G: 17 POWDER, FOR SOLUTION ORAL at 20:03

## 2024-02-01 RX ADMIN — BUSPIRONE HYDROCHLORIDE 7.5 MG: 7.5 TABLET ORAL at 09:26

## 2024-02-01 RX ADMIN — OXYCODONE HYDROCHLORIDE AND ACETAMINOPHEN 1 TABLET: 5; 325 TABLET ORAL at 09:37

## 2024-02-01 RX ADMIN — POLYETHYLENE GLYCOL 3350 17 G: 17 POWDER, FOR SOLUTION ORAL at 09:25

## 2024-02-01 RX ADMIN — REMDESIVIR 100 MG: 100 INJECTION, POWDER, LYOPHILIZED, FOR SOLUTION INTRAVENOUS at 09:25

## 2024-02-01 RX ADMIN — LAMOTRIGINE 100 MG: 100 TABLET ORAL at 20:04

## 2024-02-01 RX ADMIN — CEFTRIAXONE 1 G: 1 INJECTION, POWDER, FOR SOLUTION INTRAMUSCULAR; INTRAVENOUS at 16:50

## 2024-02-01 RX ADMIN — GLIPIZIDE 5 MG: 5 TABLET ORAL at 17:02

## 2024-02-01 RX ADMIN — PREGABALIN 100 MG: 100 CAPSULE ORAL at 20:03

## 2024-02-01 RX ADMIN — DEXAMETHASONE 6 MG: 2 TABLET ORAL at 12:50

## 2024-02-01 RX ADMIN — POLYETHYLENE GLYCOL 3350 119 G: 17 POWDER, FOR SOLUTION ORAL at 14:54

## 2024-02-01 RX ADMIN — Medication 2 MG: at 13:41

## 2024-02-01 RX ADMIN — HYDROXYZINE HYDROCHLORIDE 25 MG: 25 TABLET, FILM COATED ORAL at 16:08

## 2024-02-01 RX ADMIN — PANTOPRAZOLE SODIUM 40 MG: 40 TABLET, DELAYED RELEASE ORAL at 09:27

## 2024-02-01 RX ADMIN — OXYCODONE HYDROCHLORIDE AND ACETAMINOPHEN 1 TABLET: 5; 325 TABLET ORAL at 20:26

## 2024-02-01 RX ADMIN — EMPAGLIFLOZIN 25 MG: 25 TABLET, FILM COATED ORAL at 09:27

## 2024-02-01 RX ADMIN — GLIPIZIDE 10 MG: 10 TABLET ORAL at 09:27

## 2024-02-01 RX ADMIN — NYSTATIN 500000 UNITS: 100000 SUSPENSION ORAL at 09:26

## 2024-02-01 RX ADMIN — SODIUM CHLORIDE 50 ML: 9 INJECTION, SOLUTION INTRAVENOUS at 09:28

## 2024-02-01 RX ADMIN — BUSPIRONE HYDROCHLORIDE 7.5 MG: 7.5 TABLET ORAL at 20:03

## 2024-02-01 RX ADMIN — HYDROCORTISONE: 1 CREAM TOPICAL at 20:05

## 2024-02-01 RX ADMIN — OXYCODONE HYDROCHLORIDE AND ACETAMINOPHEN 1 TABLET: 5; 325 TABLET ORAL at 16:08

## 2024-02-01 RX ADMIN — PREGABALIN 100 MG: 100 CAPSULE ORAL at 09:27

## 2024-02-01 RX ADMIN — SENNOSIDES AND DOCUSATE SODIUM 2 TABLET: 8.6; 5 TABLET ORAL at 20:03

## 2024-02-01 RX ADMIN — TRIAMTERENE AND HYDROCHLOROTHIAZIDE 1 CAPSULE: 37.5; 25 CAPSULE ORAL at 09:27

## 2024-02-01 RX ADMIN — ACETAMINOPHEN 650 MG: 325 TABLET ORAL at 13:41

## 2024-02-01 RX ADMIN — NYSTATIN 500000 UNITS: 100000 SUSPENSION ORAL at 20:03

## 2024-02-01 ASSESSMENT — ACTIVITIES OF DAILY LIVING (ADL)
ADLS_ACUITY_SCORE: 42
ADLS_ACUITY_SCORE: 44
ADLS_ACUITY_SCORE: 42
ADLS_ACUITY_SCORE: 44
ADLS_ACUITY_SCORE: 42

## 2024-02-01 NOTE — PLAN OF CARE
Problem: Gas Exchange Impaired  Goal: Optimal Gas Exchange  Intervention: Optimize Oxygenation and Ventilation     Problem: Risk for Delirium  Goal: Improved Behavioral Control  Intervention: Minimize Safety Risk  Pt oriented x4. Very anxious, [place]. Whiteboard used for communication. Tolerating room air. VSS. IV abx continued. Pt complaining of pain from her spinal stenosis. No BM overnight. CAM boot intact, pt repositioned frequently.

## 2024-02-01 NOTE — PLAN OF CARE
Problem: Adult Inpatient Plan of Care  Goal: Absence of Hospital-Acquired Illness or Injury  Intervention: Prevent Skin Injury  Recent Flowsheet Documentation  Taken 2/1/2024 1330 by Jonnathan James RN  Body Position:   turned   right   sitting up in bed  Taken 2/1/2024 1245 by Jonnathan James RN  Body Position:   sitting up in bed   weight shifting     Problem: Adult Inpatient Plan of Care  Goal: Optimal Comfort and Wellbeing  Outcome: Progressing  Intervention: Monitor Pain and Promote Comfort  Recent Flowsheet Documentation  Taken 2/1/2024 0937 by Jonnathan James RN  Pain Management Interventions: medication (see MAR)     Problem: Adult Inpatient Plan of Care  Goal: Optimal Comfort and Wellbeing  Intervention: Monitor Pain and Promote Comfort  Recent Flowsheet Documentation  Taken 2/1/2024 0937 by Jonnathan James RN  Pain Management Interventions: medication (see MAR)   Goal Outcome Evaluation:       Pt A&O x3. Disoriented to situation. C/O generalized pain 10/10 with some relief from prn percocet and tylenol. Valium given for anxiety and lavender used. Chronic left heel ulcer cleaned and CAM boot back in place with heels floating off pillow. PIV SL. Teran in place, patent, and draining clear, yellow urine. No bm and prn suppository given, prune juice, and new orders placed. BG 71 and 68. Juice given with effect and BG 80. Q2h turns. Clear liq diet, alarms on for safety, and calls appropriately.

## 2024-02-01 NOTE — PROGRESS NOTES
[facility] MEDICINE PROGRESS NOTE      Summary: 83 year old female into [facility] on 1/28/2024 after  Presenting for weakness.     ER diagnostics showed a right upper lobe infilitrate.  She was covid  Positive on 1/25. 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  She was put on remdesivir along with  Dexamethasone.      On interview today with the white board she still needs to have  A BM.  The glucose is lower this morning due to decreased  Oral intake.  She is not on oxygen.      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  Anxiety: valium written on admission for as needed use  11.  Pressure ulcers, sacrum and right heel; daily cares  12.  Respiratory alkalosis: present on admission due to COVID, resolved  13.  Disposition:  likely TCU discharge; pending BM'ssss        Yolanda Alaniz MD  [facility] Medicine  [facility]  Phone: #[phone]  Securely message with the Vocera Web Console (learn more here)  Text page via [email or web address] Paging/Directory     Interval History/Subjective: interviewed with the white board;       Physical Exam/Objective:  Temp:  [97.3  F (36.3  C)-97.8  F (36.6  C)] 97.8  F (36.6  C)  Pulse:   [64-67] 64  Resp:  [16-20] 20  BP: (138-146)/(60-64) 140/60  SpO2:  [91 %-96 %] 91 %  There is no height or weight on file to calculate BMI.    GENERAL:  Alert; frustrated due to her need for a BM.    HEAD:  Normocephalic, without obvious abnormality, atraumatic   EYES:  PERRL, conjunctiva/corneas clear, no scleral icterus, EOM's intact   NOSE: Nares normal, septum midline, mucosa normal, no drainage   THROAT: Lips, mucosa, and tongue normal; teeth and gums normal, mouth moist   NECK: Supple, symmetrical, trachea midline   BACK:   Symmetric, no curvature, ROM normal   LUNGS:   Clear to auscultation bilaterally, no rales, rhonchi, or wheezing, symmetric chest rise on inhalation, respirations unlabored   CHEST WALL:  No tenderness or deformity   HEART:  Regular rate and rhythm, S1 and S2 normal, no murmur, rub, or gallop    ABDOMEN:   Soft, full; nonfocal   EXTREMITIES: Extremities normal, atraumatic, no cyanosis or edema    SKIN: Dry to touch, no exanthems in the visualized areas   NEURO: Alert, oriented x3, moves all four extremities freely   PSYCH: Cooperative, behavior is appropriate      Data reviewed today: I personally reviewed all new medications, labs, imaging/diagnostics reports over the past 24 hours. Pertinent findings include:    Imaging:   No results found for this or any previous visit (from the past 24 hour(s)).    Labs:      Medications:   Personally Reviewed.  Medications      busPIRone  7.5 mg Oral BID    cefTRIAXone  1 g Intravenous Q24H    dexAMETHasone  6 mg Oral Daily    DULoxetine  60 mg Oral QAM    [Held by provider] empagliflozin  25 mg Oral QAM    enoxaparin ANTICOAGULANT  40 mg Subcutaneous Q24H    glipiZIDE  10 mg Oral Daily with breakfast    glipiZIDE  5 mg Oral Daily with supper    hydrocortisone   Topical BID    insulin aspart  1-10 Units Subcutaneous TID AC    insulin aspart  1-7 Units Subcutaneous At Bedtime    [START ON 2/2/2024] insulin glargine  6 Units Subcutaneous QAM AC     lamoTRIgine  100 mg Oral BID    nystatin  500,000 Units Swish & Swallow BID    pantoprazole  40 mg Oral Daily    polyethylene glycol  17 g Oral BID    polyethylene glycol  119 g Oral Once    [Held by provider] predniSONE  1 mg Oral QAM    [Held by provider] predniSONE  5 mg Oral QAM    pregabalin  100 mg Oral BID    remdesivir  100 mg Intravenous Q24H    And    sodium chloride 0.9%  50 mL Intravenous Q24H    senna-docusate  1 tablet Oral BID    Or    senna-docusate  2 tablet Oral BID    sodium chloride (PF)  3 mL Intracatheter Q8H    [Held by provider] triamterene-HCTZ  1 capsule Oral QAM

## 2024-02-01 NOTE — PROGRESS NOTES
Care Management Follow Up    Length of Stay (days): 4    Expected Discharge Date: 02/02/2024     Concerns to be Addressed: discharge planning  Coordination with current home care providers vs. TCU placement needed at discharge    Patient plan of care discussed at interdisciplinary rounds: Yes    Anticipated Discharge Disposition:  TCU     Anticipated Discharge Services: TBD    Anticipated Discharge DME: None    Education Provided on the Discharge Plan: Yes (AVS per bedside RN)    Patient/Family in Agreement with the Plan: yes    Referrals Placed by CM/SW: Homecare, TCU        Additional Information:  CM reviewed chart.   CM unable to meet with patient face to face due to Covid +. TCU referral pending. Transportation TBD. CM following    TCU referrals pending  [facility]    Daniella Mcallister RN

## 2024-02-02 ENCOUNTER — APPOINTMENT (OUTPATIENT)
Dept: PHYSICAL THERAPY | Facility: CLINIC | Age: 84
DRG: 177 | End: 2024-02-02
Payer: MEDICARE

## 2024-02-02 ENCOUNTER — APPOINTMENT (OUTPATIENT)
Dept: OCCUPATIONAL THERAPY | Facility: CLINIC | Age: 84
DRG: 177 | End: 2024-02-02
Payer: MEDICARE

## 2024-02-02 LAB
ANION GAP SERPL CALCULATED.3IONS-SCNC: 9 MMOL/L (ref 7–15)
BUN SERPL-MCNC: 23.1 MG/DL (ref 8–23)
CALCIUM SERPL-MCNC: 9.5 MG/DL (ref 8.8–10.2)
CHLORIDE SERPL-SCNC: 100 MMOL/L (ref 98–107)
CREAT SERPL-MCNC: 0.9 MG/DL (ref 0.51–0.95)
DEPRECATED HCO3 PLAS-SCNC: 30 MMOL/L (ref 22–29)
EGFRCR SERPLBLD CKD-EPI 2021: 64 ML/MIN/1.73M2
ERYTHROCYTE [DISTWIDTH] IN BLOOD BY AUTOMATED COUNT: 19.9 % (ref 10–15)
GLUCOSE BLDC GLUCOMTR-MCNC: 109 MG/DL (ref 70–99)
GLUCOSE BLDC GLUCOMTR-MCNC: 115 MG/DL (ref 70–99)
GLUCOSE BLDC GLUCOMTR-MCNC: 147 MG/DL (ref 70–99)
GLUCOSE BLDC GLUCOMTR-MCNC: 202 MG/DL (ref 70–99)
GLUCOSE BLDC GLUCOMTR-MCNC: 342 MG/DL (ref 70–99)
GLUCOSE BLDC GLUCOMTR-MCNC: 61 MG/DL (ref 70–99)
GLUCOSE BLDC GLUCOMTR-MCNC: 64 MG/DL (ref 70–99)
GLUCOSE BLDC GLUCOMTR-MCNC: 70 MG/DL (ref 70–99)
GLUCOSE SERPL-MCNC: 68 MG/DL (ref 70–99)
HCT VFR BLD AUTO: 32.3 % (ref 35–47)
HGB BLD-MCNC: 8.5 G/DL (ref 11.7–15.7)
MCH RBC QN AUTO: 19.9 PG (ref 26.5–33)
MCHC RBC AUTO-ENTMCNC: 26.3 G/DL (ref 31.5–36.5)
MCV RBC AUTO: 76 FL (ref 78–100)
PLATELET # BLD AUTO: 362 10E3/UL (ref 150–450)
POTASSIUM SERPL-SCNC: 4.2 MMOL/L (ref 3.4–5.3)
RBC # BLD AUTO: 4.27 10E6/UL (ref 3.8–5.2)
SODIUM SERPL-SCNC: 139 MMOL/L (ref 135–145)
WBC # BLD AUTO: 6.5 10E3/UL (ref 4–11)

## 2024-02-02 PROCEDURE — 250N000012 HC RX MED GY IP 250 OP 636 PS 637: Performed by: HOSPITALIST

## 2024-02-02 PROCEDURE — 250N000011 HC RX IP 250 OP 636: Mod: JZ | Performed by: FAMILY MEDICINE

## 2024-02-02 PROCEDURE — 250N000013 HC RX MED GY IP 250 OP 250 PS 637: Performed by: FAMILY MEDICINE

## 2024-02-02 PROCEDURE — 97535 SELF CARE MNGMENT TRAINING: CPT | Mod: GO

## 2024-02-02 PROCEDURE — 97530 THERAPEUTIC ACTIVITIES: CPT | Mod: GP

## 2024-02-02 PROCEDURE — 250N000013 HC RX MED GY IP 250 OP 250 PS 637: Performed by: EMERGENCY MEDICINE

## 2024-02-02 PROCEDURE — 250N000013 HC RX MED GY IP 250 OP 250 PS 637: Performed by: HOSPITALIST

## 2024-02-02 PROCEDURE — 250N000011 HC RX IP 250 OP 636: Performed by: HOSPITALIST

## 2024-02-02 PROCEDURE — 99232 SBSQ HOSP IP/OBS MODERATE 35: CPT | Performed by: EMERGENCY MEDICINE

## 2024-02-02 PROCEDURE — 85027 COMPLETE CBC AUTOMATED: CPT | Performed by: FAMILY MEDICINE

## 2024-02-02 PROCEDURE — 258N000003 HC RX IP 258 OP 636: Performed by: FAMILY MEDICINE

## 2024-02-02 PROCEDURE — 120N000001 HC R&B MED SURG/OB

## 2024-02-02 PROCEDURE — 36415 COLL VENOUS BLD VENIPUNCTURE: CPT | Performed by: FAMILY MEDICINE

## 2024-02-02 PROCEDURE — 80048 BASIC METABOLIC PNL TOTAL CA: CPT | Performed by: FAMILY MEDICINE

## 2024-02-02 RX ADMIN — NYSTATIN 500000 UNITS: 100000 SUSPENSION ORAL at 09:00

## 2024-02-02 RX ADMIN — HYDROXYZINE HYDROCHLORIDE 25 MG: 25 TABLET, FILM COATED ORAL at 09:00

## 2024-02-02 RX ADMIN — HYDROCORTISONE: 1 CREAM TOPICAL at 21:26

## 2024-02-02 RX ADMIN — DULOXETINE HYDROCHLORIDE 60 MG: 30 CAPSULE, DELAYED RELEASE ORAL at 09:00

## 2024-02-02 RX ADMIN — PANTOPRAZOLE SODIUM 40 MG: 40 TABLET, DELAYED RELEASE ORAL at 09:00

## 2024-02-02 RX ADMIN — DEXAMETHASONE 6 MG: 2 TABLET ORAL at 12:08

## 2024-02-02 RX ADMIN — SODIUM CHLORIDE 50 ML: 9 INJECTION, SOLUTION INTRAVENOUS at 09:19

## 2024-02-02 RX ADMIN — PREGABALIN 100 MG: 100 CAPSULE ORAL at 09:00

## 2024-02-02 RX ADMIN — PREGABALIN 100 MG: 100 CAPSULE ORAL at 20:51

## 2024-02-02 RX ADMIN — POLYETHYLENE GLYCOL 3350 17 G: 17 POWDER, FOR SOLUTION ORAL at 20:53

## 2024-02-02 RX ADMIN — GLIPIZIDE 5 MG: 5 TABLET ORAL at 16:28

## 2024-02-02 RX ADMIN — HYDROCORTISONE: 1 CREAM TOPICAL at 09:12

## 2024-02-02 RX ADMIN — GLIPIZIDE 10 MG: 10 TABLET ORAL at 10:09

## 2024-02-02 RX ADMIN — SENNOSIDES AND DOCUSATE SODIUM 1 TABLET: 8.6; 5 TABLET ORAL at 20:52

## 2024-02-02 RX ADMIN — OXYCODONE HYDROCHLORIDE AND ACETAMINOPHEN 1 TABLET: 5; 325 TABLET ORAL at 16:28

## 2024-02-02 RX ADMIN — SENNOSIDES AND DOCUSATE SODIUM 2 TABLET: 8.6; 5 TABLET ORAL at 09:00

## 2024-02-02 RX ADMIN — REMDESIVIR 100 MG: 100 INJECTION, POWDER, LYOPHILIZED, FOR SOLUTION INTRAVENOUS at 09:12

## 2024-02-02 RX ADMIN — ACETAMINOPHEN 650 MG: 325 TABLET ORAL at 12:08

## 2024-02-02 RX ADMIN — OXYCODONE HYDROCHLORIDE AND ACETAMINOPHEN 1 TABLET: 5; 325 TABLET ORAL at 00:41

## 2024-02-02 RX ADMIN — LAMOTRIGINE 100 MG: 100 TABLET ORAL at 09:00

## 2024-02-02 RX ADMIN — BUSPIRONE HYDROCHLORIDE 7.5 MG: 7.5 TABLET ORAL at 21:24

## 2024-02-02 RX ADMIN — BUSPIRONE HYDROCHLORIDE 7.5 MG: 7.5 TABLET ORAL at 09:00

## 2024-02-02 RX ADMIN — POLYETHYLENE GLYCOL 3350 17 G: 17 POWDER, FOR SOLUTION ORAL at 09:00

## 2024-02-02 RX ADMIN — ENOXAPARIN SODIUM 40 MG: 100 INJECTION SUBCUTANEOUS at 16:36

## 2024-02-02 RX ADMIN — LAMOTRIGINE 100 MG: 100 TABLET ORAL at 21:24

## 2024-02-02 RX ADMIN — OXYCODONE HYDROCHLORIDE AND ACETAMINOPHEN 1 TABLET: 5; 325 TABLET ORAL at 20:51

## 2024-02-02 RX ADMIN — NYSTATIN 500000 UNITS: 100000 SUSPENSION ORAL at 20:50

## 2024-02-02 RX ADMIN — OXYCODONE HYDROCHLORIDE AND ACETAMINOPHEN 1 TABLET: 5; 325 TABLET ORAL at 09:00

## 2024-02-02 ASSESSMENT — ACTIVITIES OF DAILY LIVING (ADL)
ADLS_ACUITY_SCORE: 44
ADLS_ACUITY_SCORE: 45
ADLS_ACUITY_SCORE: 44

## 2024-02-02 NOTE — PLAN OF CARE
Problem: Gas Exchange Impaired  Goal: Optimal Gas Exchange  Outcome: Progressing     Problem: Pain Acute  Goal: Optimal Pain Control and Function  Outcome: Progressing     Problem: Comorbidity Management  Goal: Blood Glucose Levels Within Targeted Range  Outcome: Progressing    Patient given prn percocet for pain tonight. Rated pain the same as prior to administration; however, stated it was helpful. Blood glucose 109. Repositioned throughout the night. Teran catheter in place and patent. Special precautions maintained for COVID. Bed alarm in place for patient's safety.

## 2024-02-02 NOTE — PLAN OF CARE
Goal Outcome Evaluation:       Pt A&O but disoriented to situation. VSS and O2 sats maintained above 90% on RA. C/o lower back and coccyx pain with some relief from prn medication and repositioning. Prn and lavender given for anxiety. CAM boot on left foot in place and heels floated off pillows throughout shift. Mepalex on coccyx in place. Teran in place, patent, and draining clear, yellow urine. BG 70 this AM, juice given and 115 upon recheck. BG 64 before lunch. 15g carbs given and pt didn't finish all. Upon recheck, BG 61 and patient ate lunch at that time. Rechecked after lunch and .  Q2 turns, reg diet, call light within reach, alarms on for safety and calls appropriately.

## 2024-02-02 NOTE — PROGRESS NOTES
"[facility] MEDICINE PROGRESS NOTE      Summary: 83 year old female into [facility] on 1/28/2024 after  Presenting for weakness.     ER diagnostics showed a right upper lobe infilitrate.  She was covid  Positive on 1/25. 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  She was put on remdesivir along with  Dexamethasone.      On interview today with the white board she still needs to have  A BM.  The glucose is lower this morning due to decreased  Oral intake.  She is not on oxygen.      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  Anxiety: valium written on admission for as needed use  11.  Pressure ulcers, sacrum and right heel; daily cares  12.  Respiratory alkalosis: present on admission due to COVID, resolved  13.  Disposition:  CM working on arrangements; TCU's are turining   Her down due to \"behavior\" issues;         Yolanda Alaniz MD  [facility] Medicine  [facility]  Phone: #[phone]  Securely message with the Vocera Web Console (learn more here)  Text page via [employer] Paging/Directory     Interval History/Subjective: stable, no new issues      Physical Exam/Objective:  Temp:  [97.7  F (36.5  C)-98.8  F (37.1  C)] 98.8  F (37.1  C)  Pulse:  [65-75] 75  Resp:  [18-21] 21  BP: (132-183)/(62-80) 132/62  SpO2:  [90 " %-95 %] 92 %  Body mass index is 32.85 kg/m .    GENERAL:  Alert; feeling improved   HEAD:  Normocephalic, without obvious abnormality, atraumatic   EYES:  PERRL, conjunctiva/corneas clear, no scleral icterus, EOM's intact   NOSE: Nares normal, septum midline, mucosa normal, no drainage   THROAT: Lips, mucosa, and tongue normal; teeth and gums normal, mouth moist   NECK: Supple, symmetrical, trachea midline   BACK:   Symmetric, no curvature, ROM normal   LUNGS:   Clear to auscultation bilaterally, no rales, rhonchi, or wheezing, symmetric chest rise on inhalation, respirations unlabored   CHEST WALL:  No tenderness or deformity   HEART:  Regular rate and rhythm, S1 and S2 normal, no murmur, rub, or gallop    ABDOMEN:   Soft, full; nonfocal   EXTREMITIES: Extremities normal, atraumatic, no cyanosis or edema    SKIN: Dry to touch, no exanthems in the visualized areas   NEURO: Alert, oriented x3, moves all four extremities freely   PSYCH: Cooperative, behavior is appropriate      Data reviewed today: I personally reviewed all new medications, labs, imaging/diagnostics reports over the past 24 hours. Pertinent findings include:    Imaging:   Recent Results (from the past 24 hour(s))   XR Abdomen Port 1 View    Narrative    EXAM: XR ABDOMEN PORT 1 VIEW  LOCATION: [facility]  DATE: 2/1/2024    INDICATION: Constipated.  COMPARISON: None.      Impression    IMPRESSION: Small amount of stool. Nonobstructive bowel gas pattern.       Labs:      Medications:   Personally Reviewed.  Medications      busPIRone  7.5 mg Oral BID    cefTRIAXone  1 g Intravenous Q24H    dexAMETHasone  6 mg Oral Daily    DULoxetine  60 mg Oral QAM    [Held by provider] empagliflozin  25 mg Oral QAM    enoxaparin ANTICOAGULANT  40 mg Subcutaneous Q24H    glipiZIDE  10 mg Oral Daily with breakfast    glipiZIDE  5 mg Oral Daily with supper    hydrocortisone   Topical BID    insulin aspart  1-10 Units Subcutaneous TID AC     insulin aspart  1-7 Units Subcutaneous At Bedtime    insulin glargine  6 Units Subcutaneous QAM AC    lamoTRIgine  100 mg Oral BID    nystatin  500,000 Units Swish & Swallow BID    pantoprazole  40 mg Oral Daily    polyethylene glycol  17 g Oral BID    [Held by provider] predniSONE  1 mg Oral QAM    [Held by provider] predniSONE  5 mg Oral QAM    pregabalin  100 mg Oral BID    senna-docusate  1 tablet Oral BID    Or    senna-docusate  2 tablet Oral BID    sodium chloride (PF)  3 mL Intracatheter Q8H    [Held by provider] triamterene-HCTZ  1 capsule Oral QAM

## 2024-02-02 NOTE — PLAN OF CARE
Problem: Adult Inpatient Plan of Care  Goal: Optimal Comfort and Wellbeing  Outcome: Progressing  Intervention: Monitor Pain and Promote Comfort    Problem: Gas Exchange Impaired  Goal: Optimal Gas Exchange  Outcome: Progressing  Intervention: Optimize Oxygenation and Ventilation     Goal Outcome Evaluation:  Pt A/Ox4, very hard of hearing even with hearing aids, utilizing white board to communicate, pt report back pain, manageable with reposition and prn medication, LS diminished, pt remained on room air, O2 sat WNL, 1x BM this shift, pericare and nolasco care done, wound cleansed and mepliex placed on sacrum/gluteal cleft, left heels open to air offload at all times, boot intact, discharge pending, continue to monitor.

## 2024-02-03 LAB
CREAT SERPL-MCNC: 0.9 MG/DL (ref 0.51–0.95)
EGFRCR SERPLBLD CKD-EPI 2021: 64 ML/MIN/1.73M2
GLUCOSE BLDC GLUCOMTR-MCNC: 100 MG/DL (ref 70–99)
GLUCOSE BLDC GLUCOMTR-MCNC: 277 MG/DL (ref 70–99)
GLUCOSE BLDC GLUCOMTR-MCNC: 368 MG/DL (ref 70–99)
GLUCOSE BLDC GLUCOMTR-MCNC: 376 MG/DL (ref 70–99)
GLUCOSE BLDC GLUCOMTR-MCNC: 75 MG/DL (ref 70–99)

## 2024-02-03 PROCEDURE — 250N000013 HC RX MED GY IP 250 OP 250 PS 637: Performed by: FAMILY MEDICINE

## 2024-02-03 PROCEDURE — 250N000013 HC RX MED GY IP 250 OP 250 PS 637: Performed by: EMERGENCY MEDICINE

## 2024-02-03 PROCEDURE — 250N000011 HC RX IP 250 OP 636: Performed by: HOSPITALIST

## 2024-02-03 PROCEDURE — 250N000012 HC RX MED GY IP 250 OP 636 PS 637: Performed by: HOSPITALIST

## 2024-02-03 PROCEDURE — 120N000001 HC R&B MED SURG/OB

## 2024-02-03 PROCEDURE — 36415 COLL VENOUS BLD VENIPUNCTURE: CPT | Performed by: HOSPITALIST

## 2024-02-03 PROCEDURE — 250N000013 HC RX MED GY IP 250 OP 250 PS 637: Performed by: HOSPITALIST

## 2024-02-03 PROCEDURE — 82565 ASSAY OF CREATININE: CPT | Performed by: HOSPITALIST

## 2024-02-03 PROCEDURE — 99232 SBSQ HOSP IP/OBS MODERATE 35: CPT | Performed by: EMERGENCY MEDICINE

## 2024-02-03 RX ORDER — PREDNISONE 5 MG/1
10 TABLET ORAL DAILY
Status: DISCONTINUED | OUTPATIENT
Start: 2024-02-04 | End: 2024-02-07

## 2024-02-03 RX ADMIN — POLYETHYLENE GLYCOL 3350 17 G: 17 POWDER, FOR SOLUTION ORAL at 20:13

## 2024-02-03 RX ADMIN — HYDROCORTISONE: 1 CREAM TOPICAL at 09:08

## 2024-02-03 RX ADMIN — POLYETHYLENE GLYCOL 3350 17 G: 17 POWDER, FOR SOLUTION ORAL at 09:02

## 2024-02-03 RX ADMIN — HYDROCORTISONE: 1 CREAM TOPICAL at 20:15

## 2024-02-03 RX ADMIN — GLIPIZIDE 10 MG: 10 TABLET ORAL at 09:02

## 2024-02-03 RX ADMIN — ACETAMINOPHEN 650 MG: 325 TABLET ORAL at 09:02

## 2024-02-03 RX ADMIN — OXYCODONE HYDROCHLORIDE AND ACETAMINOPHEN 1 TABLET: 5; 325 TABLET ORAL at 20:14

## 2024-02-03 RX ADMIN — PREGABALIN 100 MG: 100 CAPSULE ORAL at 09:02

## 2024-02-03 RX ADMIN — GLIPIZIDE 5 MG: 5 TABLET ORAL at 16:19

## 2024-02-03 RX ADMIN — LAMOTRIGINE 100 MG: 100 TABLET ORAL at 20:14

## 2024-02-03 RX ADMIN — SENNOSIDES AND DOCUSATE SODIUM 2 TABLET: 8.6; 5 TABLET ORAL at 20:14

## 2024-02-03 RX ADMIN — NYSTATIN 500000 UNITS: 100000 SUSPENSION ORAL at 20:13

## 2024-02-03 RX ADMIN — BUSPIRONE HYDROCHLORIDE 7.5 MG: 7.5 TABLET ORAL at 09:02

## 2024-02-03 RX ADMIN — SENNOSIDES AND DOCUSATE SODIUM 2 TABLET: 8.6; 5 TABLET ORAL at 09:02

## 2024-02-03 RX ADMIN — DULOXETINE HYDROCHLORIDE 60 MG: 30 CAPSULE, DELAYED RELEASE ORAL at 09:02

## 2024-02-03 RX ADMIN — OXYCODONE HYDROCHLORIDE AND ACETAMINOPHEN 1 TABLET: 5; 325 TABLET ORAL at 04:54

## 2024-02-03 RX ADMIN — OXYCODONE HYDROCHLORIDE AND ACETAMINOPHEN 1 TABLET: 5; 325 TABLET ORAL at 16:19

## 2024-02-03 RX ADMIN — DEXAMETHASONE 6 MG: 2 TABLET ORAL at 12:20

## 2024-02-03 RX ADMIN — LAMOTRIGINE 100 MG: 100 TABLET ORAL at 09:02

## 2024-02-03 RX ADMIN — PREGABALIN 100 MG: 100 CAPSULE ORAL at 20:14

## 2024-02-03 RX ADMIN — NYSTATIN 500000 UNITS: 100000 SUSPENSION ORAL at 09:02

## 2024-02-03 RX ADMIN — Medication 2 MG: at 09:39

## 2024-02-03 RX ADMIN — HYDROXYZINE HYDROCHLORIDE 25 MG: 25 TABLET, FILM COATED ORAL at 11:16

## 2024-02-03 RX ADMIN — ENOXAPARIN SODIUM 40 MG: 100 INJECTION SUBCUTANEOUS at 16:31

## 2024-02-03 RX ADMIN — HYDROXYZINE HYDROCHLORIDE 25 MG: 25 TABLET, FILM COATED ORAL at 18:36

## 2024-02-03 RX ADMIN — BUSPIRONE HYDROCHLORIDE 7.5 MG: 7.5 TABLET ORAL at 20:14

## 2024-02-03 RX ADMIN — OXYCODONE HYDROCHLORIDE AND ACETAMINOPHEN 1 TABLET: 5; 325 TABLET ORAL at 11:14

## 2024-02-03 RX ADMIN — PANTOPRAZOLE SODIUM 40 MG: 40 TABLET, DELAYED RELEASE ORAL at 09:02

## 2024-02-03 ASSESSMENT — ACTIVITIES OF DAILY LIVING (ADL)
ADLS_ACUITY_SCORE: 43
ADLS_ACUITY_SCORE: 50
ADLS_ACUITY_SCORE: 51
ADLS_ACUITY_SCORE: 51
ADLS_ACUITY_SCORE: 43
ADLS_ACUITY_SCORE: 50
ADLS_ACUITY_SCORE: 45
ADLS_ACUITY_SCORE: 50
ADLS_ACUITY_SCORE: 45
ADLS_ACUITY_SCORE: 45
ADLS_ACUITY_SCORE: 51
ADLS_ACUITY_SCORE: 51

## 2024-02-03 NOTE — PLAN OF CARE
Problem: Adult Inpatient Plan of Care  Goal: Absence of Hospital-Acquired Illness or Injury  Intervention: Identify and Manage Fall Risk  Recent Flowsheet Documentation  Taken 2/2/2024 2051 by Jahaira Martin RN  Safety Promotion/Fall Prevention:   safety round/check completed   room organization consistent   room near nurse's station   nonskid shoes/slippers when out of bed   mobility aid in reach   clutter free environment maintained     Problem: Adult Inpatient Plan of Care  Goal: Optimal Comfort and Wellbeing  Intervention: Monitor Pain and Promote Comfort  Recent Flowsheet Documentation  Taken 2/2/2024 2136 by Jahaira Martin RN  Pain Management Interventions: rest  Taken 2/2/2024 2051 by Jahaira Martin RN  Pain Management Interventions: medication (see MAR)     Problem: Risk for Delirium  Goal: Improved Sleep  Intervention: Promote Sleep  Recent Flowsheet Documentation  Taken 2/2/2024 2051 by Jahaira Martin RN  Sleep/Rest Enhancement:   room darkened   regular sleep/rest pattern promoted   Goal Outcome Evaluation:     Patient alert and oriented x 4, speech clear.  [place] uses white board and reads lips which while on isolation precautions increases patient anxiety while communicating.  Easily redirectable. Lungs diminished on room ar.  Given Percocet prn for generalized pain and back pain.  Q2H turn and reposition, assist 2.  Mepilex intact on buttock/coccyx.  Left heel light brown scab  firm and intact, no drainage or redness.  Foam boot to off load. Indwelling catheter for retention draining straw colored urine. (R) PIV patent and flushed. Taking medication whole with applesauce.

## 2024-02-03 NOTE — PROGRESS NOTES
Care Management Follow Up    Length of Stay (days): 6    Expected Discharge Date: 02/03/2024     Concerns to be Addressed: discharge planning  Coordination with current home care providers vs. TCU placement needed at discharge    Patient plan of care discussed at interdisciplinary rounds: Yes    Anticipated Discharge Disposition:  TCU     Anticipated Discharge Services: None    Anticipated Discharge DME: None    Patient/family educated on Medicare website which has current facility and service quality ratings: no    Education Provided on the Discharge Plan: Yes (AVS per bedside RN)    Patient/Family in Agreement with the Plan: unable to assess    Referrals Placed by CM/SW: Post Acute Facilities      Additional Information:  CM reviewed chart. CM unable to meet with patient face to face due to Covid +. CM attempted to speak to patient via phone but patient did not answer. TCU referrals pending. Unable to assess if patient is agreeable to TCU. Left message for bedside RN to ask patient if she is agreeable to TCU. Family will provide transportation at discharge.     Referrals pending  [facility]   [facility] ()   [facility] ([facility])     Daniella Mcallister RN

## 2024-02-03 NOTE — PLAN OF CARE
Goal Outcome Evaluation:       Problem: Adult Inpatient Plan of Care  Goal: Absence of Hospital-Acquired Illness or Injury  Intervention: Identify and Manage Fall Risk  Recent Flowsheet Documentation  Taken 2/3/2024 0451 by Jahaira Martin RN  Safety Promotion/Fall Prevention:   safety round/check completed   room organization consistent   room near nurse's station   nonskid shoes/slippers when out of bed   mobility aid in reach   clutter free environment maintained  Taken 2/3/2024 0400 by Jahaira Martin RN  Safety Promotion/Fall Prevention:   safety round/check completed   room organization consistent   room near nurse's station   nonskid shoes/slippers when out of bed   mobility aid in reach   clutter free environment maintained  Taken 2/3/2024 0152 by Jahaira Martin RN  Safety Promotion/Fall Prevention:   safety round/check completed   room organization consistent   room near nurse's station   nonskid shoes/slippers when out of bed   mobility aid in reach   clutter free environment maintained  Taken 2/3/2024 0100 by Jahaira Martin RN  Safety Promotion/Fall Prevention:   safety round/check completed   room organization consistent   room near nurse's station   nonskid shoes/slippers when out of bed   mobility aid in reach   clutter free environment maintained  Taken 2/2/2024 2346 by Jahaira Martin RN  Safety Promotion/Fall Prevention:   safety round/check completed   room organization consistent   room near nurse's station   nonskid shoes/slippers when out of bed   mobility aid in reach   clutter free environment maintained  Taken 2/2/2024 2240 by Jahaira Martin RN  Safety Promotion/Fall Prevention:   safety round/check completed   room organization consistent   room near nurse's station   nonskid shoes/slippers when out of bed   mobility aid in reach   clutter free environment maintained  Taken 2/2/2024 2136 by Jahaira Martin RN  Safety  Promotion/Fall Prevention:   safety round/check completed   room organization consistent   room near nurse's station   nonskid shoes/slippers when out of bed   mobility aid in reach   clutter free environment maintained  Taken 2/2/2024 2051 by Jahaira Martin RN  Safety Promotion/Fall Prevention:   safety round/check completed   room organization consistent   room near nurse's station   nonskid shoes/slippers when out of bed   mobility aid in reach   clutter free environment maintained     Problem: Adult Inpatient Plan of Care  Goal: Absence of Hospital-Acquired Illness or Injury  Intervention: Prevent Skin Injury  Recent Flowsheet Documentation  Taken 2/3/2024 0451 by Jahaira Martin RN  Body Position:   left   turned  Taken 2/3/2024 0152 by Jahaira Martin RN  Body Position:   right   turned  Taken 2/3/2024 0100 by Jahaira Martin RN  Skin Protection: adhesive use limited  Device Skin Pressure Protection:   absorbent pad utilized/changed   tubing/devices free from skin contact  Taken 2/2/2024 2240 by Jahaira Martin RN  Body Position:   left   turned  Taken 2/2/2024 2051 by Jahaira Martin RN  Body Position:   right   turned  Skin Protection: adhesive use limited  Device Skin Pressure Protection:   absorbent pad utilized/changed   tubing/devices free from skin contact    Patient alert and oriented x 4, speech clear.  [place] uses white board and reads lips which while on isolation precautions increases patient anxiety while communicating.  Easily redirectable. Lungs diminished on room ar.  Given Percocet prn for generalized pain and back pain.  Q2H turn and reposition, assist 2.  Mepilex intact on buttock/coccyx area deep red color with dime size bruise, tissue intact.  Left heel light brown scab  firm and intact, no drainage or redness.  Foam boot to off load. Indwelling catheter for retention draining straw colored urine. (R) PIV patent and flushed. Taking  medication whole with applesauce.

## 2024-02-03 NOTE — PLAN OF CARE
Goal Outcome Evaluation:         Problem: Comorbidity Management  Goal: Blood Glucose Levels Within Targeted Range  Outcome: Progressing  Intervention: Monitor and Manage Glycemia  Recent Flowsheet Documentation  Taken 2/3/2024 1114 by Jonnathan James RN  Medication Review/Management:   medications reviewed   high-risk medications identified     Problem: Pain Acute  Goal: Optimal Pain Control and Function  Outcome: Not Progressing  Intervention: Develop Pain Management Plan  Recent Flowsheet Documentation  Taken 2/3/2024 1114 by Jonnathan James RN  Pain Management Interventions: rest  Intervention: Prevent or Manage Pain  Recent Flowsheet Documentation  Taken 2/3/2024 1114 by Jonnathan James RN  Medication Review/Management:   medications reviewed   high-risk medications identified         Pt A&O x4 and VSS. C/O lower back and coccyx pain with some relief from prn pain medication, reposition, and lavender. Atarax and Valium administered for anxiety. Teran patent and draining clear, pascale-colored urine and no BM today. PIV patent and SL. BG 75 and 100. Repositioned Q2h. CAM boot in place on left foot and chronic L heel ulcer open to air. Coccyx wound covered with mepalex. A2 pivot to the commode. Reg diet. Alarms on for safety, call light within reach and calls appropriately.

## 2024-02-03 NOTE — PROGRESS NOTES
"[facility] MEDICINE PROGRESS NOTE      Summary: 83 year old female into [facility] on 1/28/2024 after  Presenting for weakness.     ER diagnostics showed a right upper lobe infilitrate.  She was covid  Positive on 1/25. 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  She was put on remdesivir along with  Dexamethasone.      On interview today with the white board she still is tearful over  Her discharge plans.  She wants to be here until she is stronger.    Per phone call by [facility] with her helpful son Davie he is planning  To come here later today for further conversations.      She needs to be able to transfer from the bed to the wheelchair  Before she can return home.      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  Anxiety: valium written on admission for as needed use  11.  Pressure ulcers, sacrum and right heel; daily cares  12.  Respiratory alkalosis: present on admission due to COVID, resolved  13.  Disposition:  CM working on arrangements; TCU's are turining   Her down due to \"behavior\" issues;         Yolanda Alaniz MD  [facility]  [facility]  [facility]  Phone: #[phone]  Securely message with the Vocera Web Console (learn more here)  Text page via [employer] Paging/Directory     Interval History/Subjective: stable, no new " issues      Physical Exam/Objective:  Temp:  [98.2  F (36.8  C)-99  F (37.2  C)] 99  F (37.2  C)  Pulse:  [77-86] 86  Resp:  [16-18] 16  BP: (111-144)/(56-65) 111/56  SpO2:  [93 %-96 %] 96 %  Body mass index is 33.72 kg/m .    GENERAL:  Alert; feeling improved   HEAD:  Normocephalic, without obvious abnormality, atraumatic   EYES:  PERRL, conjunctiva/corneas clear, no scleral icterus, EOM's intact   NOSE: Nares normal, septum midline, mucosa normal, no drainage   THROAT: Lips, mucosa, and tongue normal; teeth and gums normal, mouth moist   NECK: Supple, symmetrical, trachea midline   BACK:   Symmetric, no curvature, ROM normal   LUNGS:   Clear to auscultation bilaterally, no rales, rhonchi, or wheezing, symmetric chest rise on inhalation, respirations unlabored   CHEST WALL:  No tenderness or deformity   HEART:  Regular rate and rhythm, S1 and S2 normal, no murmur, rub, or gallop    ABDOMEN:   Soft, full; nonfocal   EXTREMITIES: Extremities normal, atraumatic, no cyanosis or edema    SKIN: Dry to touch, no exanthems in the visualized areas   NEURO: Alert, oriented x3, moves all four extremities freely   PSYCH: Cooperative, behavior is appropriate      Data reviewed today: I personally reviewed all new medications, labs, imaging/diagnostics reports over the past 24 hours. Pertinent findings include:    Imaging:   No results found for this or any previous visit (from the past 24 hour(s)).      Labs:      Medications:   Personally Reviewed.  Medications      busPIRone  7.5 mg Oral BID    [Held by provider] empagliflozin  25 mg Oral QAM    enoxaparin ANTICOAGULANT  40 mg Subcutaneous Q24H    glipiZIDE  10 mg Oral Daily with breakfast    glipiZIDE  5 mg Oral Daily with supper    hydrocortisone   Topical BID    insulin aspart  1-10 Units Subcutaneous TID AC    insulin aspart  1-7 Units Subcutaneous At Bedtime    insulin glargine  6 Units Subcutaneous QAM AC    lamoTRIgine  100 mg Oral BID    nystatin  500,000 Units Swish  & Swallow BID    pantoprazole  40 mg Oral Daily    polyethylene glycol  17 g Oral BID    [Held by provider] predniSONE  1 mg Oral QAM    [START ON 2/4/2024] predniSONE  10 mg Oral Daily    [Held by provider] predniSONE  5 mg Oral QAM    pregabalin  100 mg Oral BID    senna-docusate  1 tablet Oral BID    Or    senna-docusate  2 tablet Oral BID    sodium chloride (PF)  3 mL Intracatheter Q8H    [Held by provider] triamterene-HCTZ  1 capsule Oral QAM

## 2024-02-04 LAB
GLUCOSE BLDC GLUCOMTR-MCNC: 122 MG/DL (ref 70–99)
GLUCOSE BLDC GLUCOMTR-MCNC: 203 MG/DL (ref 70–99)
GLUCOSE BLDC GLUCOMTR-MCNC: 248 MG/DL (ref 70–99)
GLUCOSE BLDC GLUCOMTR-MCNC: 286 MG/DL (ref 70–99)
GLUCOSE BLDC GLUCOMTR-MCNC: 299 MG/DL (ref 70–99)

## 2024-02-04 PROCEDURE — 120N000001 HC R&B MED SURG/OB

## 2024-02-04 PROCEDURE — 250N000013 HC RX MED GY IP 250 OP 250 PS 637: Performed by: EMERGENCY MEDICINE

## 2024-02-04 PROCEDURE — 99232 SBSQ HOSP IP/OBS MODERATE 35: CPT | Performed by: EMERGENCY MEDICINE

## 2024-02-04 PROCEDURE — 250N000011 HC RX IP 250 OP 636: Performed by: HOSPITALIST

## 2024-02-04 PROCEDURE — 250N000012 HC RX MED GY IP 250 OP 636 PS 637: Performed by: EMERGENCY MEDICINE

## 2024-02-04 PROCEDURE — 250N000013 HC RX MED GY IP 250 OP 250 PS 637: Performed by: HOSPITALIST

## 2024-02-04 PROCEDURE — 250N000013 HC RX MED GY IP 250 OP 250 PS 637: Performed by: FAMILY MEDICINE

## 2024-02-04 RX ORDER — LACTULOSE 10 G/15ML
20 SOLUTION ORAL 2 TIMES DAILY
Qty: 30 ML | Refills: 0 | Status: COMPLETED | OUTPATIENT
Start: 2024-02-04 | End: 2024-02-04

## 2024-02-04 RX ADMIN — GLIPIZIDE 5 MG: 5 TABLET ORAL at 17:08

## 2024-02-04 RX ADMIN — HYDROXYZINE HYDROCHLORIDE 25 MG: 25 TABLET, FILM COATED ORAL at 02:15

## 2024-02-04 RX ADMIN — BUSPIRONE HYDROCHLORIDE 7.5 MG: 7.5 TABLET ORAL at 09:02

## 2024-02-04 RX ADMIN — HYDROXYZINE HYDROCHLORIDE 25 MG: 25 TABLET, FILM COATED ORAL at 09:02

## 2024-02-04 RX ADMIN — HYDROCORTISONE: 1 CREAM TOPICAL at 20:29

## 2024-02-04 RX ADMIN — Medication 2 MG: at 13:50

## 2024-02-04 RX ADMIN — POLYETHYLENE GLYCOL 3350 17 G: 17 POWDER, FOR SOLUTION ORAL at 09:01

## 2024-02-04 RX ADMIN — PANTOPRAZOLE SODIUM 40 MG: 40 TABLET, DELAYED RELEASE ORAL at 09:03

## 2024-02-04 RX ADMIN — PREGABALIN 100 MG: 100 CAPSULE ORAL at 09:04

## 2024-02-04 RX ADMIN — OXYCODONE HYDROCHLORIDE AND ACETAMINOPHEN 1 TABLET: 5; 325 TABLET ORAL at 02:14

## 2024-02-04 RX ADMIN — ENOXAPARIN SODIUM 40 MG: 100 INJECTION SUBCUTANEOUS at 17:08

## 2024-02-04 RX ADMIN — HYDROCORTISONE: 1 CREAM TOPICAL at 09:01

## 2024-02-04 RX ADMIN — GLIPIZIDE 10 MG: 10 TABLET ORAL at 09:02

## 2024-02-04 RX ADMIN — PREDNISONE 10 MG: 5 TABLET ORAL at 09:02

## 2024-02-04 RX ADMIN — NYSTATIN 500000 UNITS: 100000 SUSPENSION ORAL at 20:21

## 2024-02-04 RX ADMIN — HYDROXYZINE HYDROCHLORIDE 25 MG: 25 TABLET, FILM COATED ORAL at 15:45

## 2024-02-04 RX ADMIN — NYSTATIN 500000 UNITS: 100000 SUSPENSION ORAL at 09:02

## 2024-02-04 RX ADMIN — OXYCODONE HYDROCHLORIDE AND ACETAMINOPHEN 1 TABLET: 5; 325 TABLET ORAL at 09:01

## 2024-02-04 RX ADMIN — SENNOSIDES AND DOCUSATE SODIUM 2 TABLET: 8.6; 5 TABLET ORAL at 20:21

## 2024-02-04 RX ADMIN — ACETAMINOPHEN 650 MG: 325 TABLET ORAL at 20:21

## 2024-02-04 RX ADMIN — LACTULOSE 20 G: 10 SOLUTION ORAL at 12:43

## 2024-02-04 RX ADMIN — POLYETHYLENE GLYCOL 3350 17 G: 17 POWDER, FOR SOLUTION ORAL at 20:21

## 2024-02-04 RX ADMIN — LAMOTRIGINE 100 MG: 100 TABLET ORAL at 20:25

## 2024-02-04 RX ADMIN — ACETAMINOPHEN 650 MG: 325 TABLET ORAL at 00:21

## 2024-02-04 RX ADMIN — BUSPIRONE HYDROCHLORIDE 7.5 MG: 7.5 TABLET ORAL at 20:25

## 2024-02-04 RX ADMIN — OXYCODONE HYDROCHLORIDE AND ACETAMINOPHEN 1 TABLET: 5; 325 TABLET ORAL at 15:45

## 2024-02-04 RX ADMIN — PREGABALIN 100 MG: 100 CAPSULE ORAL at 20:21

## 2024-02-04 RX ADMIN — LAMOTRIGINE 100 MG: 100 TABLET ORAL at 09:02

## 2024-02-04 RX ADMIN — SENNOSIDES AND DOCUSATE SODIUM 2 TABLET: 8.6; 5 TABLET ORAL at 09:03

## 2024-02-04 ASSESSMENT — ACTIVITIES OF DAILY LIVING (ADL)
ADLS_ACUITY_SCORE: 50
ADLS_ACUITY_SCORE: 48
ADLS_ACUITY_SCORE: 48
ADLS_ACUITY_SCORE: 51
ADLS_ACUITY_SCORE: 48
ADLS_ACUITY_SCORE: 48
ADLS_ACUITY_SCORE: 51
ADLS_ACUITY_SCORE: 48
ADLS_ACUITY_SCORE: 50
ADLS_ACUITY_SCORE: 50

## 2024-02-04 NOTE — PROGRESS NOTES
Patient blood sugar 368 and given Aspart 7 units, recheck 376.  Patient had consumed a chocolate fiber cookie and juice.  All sugar taken from room and water available.  Paged MD. MD paged to recheck blood glucose at 2 am and wait for 7 units to work

## 2024-02-04 NOTE — PROGRESS NOTES
"[facility] MEDICINE PROGRESS NOTE      Summary: 83 year old female into [facility] on 1/28/2024 after  Presenting for weakness.     ER diagnostics showed a right upper lobe infilitrate.  She was covid  Positive on 1/25. 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  She was put on remdesivir along with  Dexamethasone.      On interview today with the white board she still is tearful over  Her discharge plans.  She wants to be here until she is stronger.    Per phone call by [facility] with her helpful son Davie he is planning  To come Sunday for conversations.    She needs to be able to transfer from the bed to the wheelchair  Before she can return home.      Hospital day number 7    Problem list:     COVID: diagnosed on 1/25; stable; off meds; improved  2.    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  Anxiety: valium written on admission for as needed use  11.  Pressure ulcers, sacrum and right heel; daily cares  12.  Respiratory alkalosis: present on admission due to COVID, resolved  13.  Disposition:  CM working on arrangements; TCU's are turining   Her down due to \"behavior\" issues;         Yolanda Alaniz MD  [facility] Medicine  [facility]  Phone: #[phone]  Securely message with the Vocera Web Console (learn more here)  Text page via [employer] Paging/Directory     Interval History/Subjective: per the RN she does not take her   Miralax regularly      Physical Exam/Objective:  Temp:  [97.5  F " (36.4  C)-98.8  F (37.1  C)] 98.3  F (36.8  C)  Pulse:  [73-81] 73  Resp:  [16-20] 18  BP: (109-153)/(52-69) 153/69  SpO2:  [90 %-95 %] 92 %  Body mass index is 33.72 kg/m .    GENERAL:  Alert; feeling improved   HEAD:  Normocephalic, without obvious abnormality, atraumatic   EYES:  PERRL, conjunctiva/corneas clear, no scleral icterus, EOM's intact   NOSE: Nares normal, septum midline, mucosa normal, no drainage   THROAT: Lips, mucosa, and tongue normal; teeth and gums normal, mouth moist   NECK: Supple, symmetrical, trachea midline   BACK:   Symmetric, no curvature, ROM normal   LUNGS:   Clear to auscultation bilaterally, no rales, rhonchi, or wheezing, symmetric chest rise on inhalation, respirations unlabored   CHEST WALL:  No tenderness or deformity   HEART:  Regular rate and rhythm, S1 and S2 normal, no murmur, rub, or gallop    ABDOMEN:   Soft, full; nonfocal   EXTREMITIES: Extremities normal, atraumatic, no cyanosis or edema    SKIN: Dry to touch, no exanthems in the visualized areas   NEURO: Alert, oriented x3, moves all four extremities freely   PSYCH: Cooperative, behavior is appropriate      Data reviewed today: I personally reviewed all new medications, labs, imaging/diagnostics reports over the past 24 hours. Pertinent findings include:    Imaging:   No results found for this or any previous visit (from the past 24 hour(s)).      Labs:      Medications:   Personally Reviewed.  Medications      busPIRone  7.5 mg Oral BID    [Held by provider] empagliflozin  25 mg Oral QAM    enoxaparin ANTICOAGULANT  40 mg Subcutaneous Q24H    glipiZIDE  10 mg Oral Daily with breakfast    glipiZIDE  5 mg Oral Daily with supper    hydrocortisone   Topical BID    insulin aspart  1-10 Units Subcutaneous TID AC    insulin aspart  1-7 Units Subcutaneous At Bedtime    insulin glargine  6 Units Subcutaneous QAM AC    lactulose  20 g Oral BID    lamoTRIgine  100 mg Oral BID    nystatin  500,000 Units Swish & Swallow BID     pantoprazole  40 mg Oral Daily    polyethylene glycol  17 g Oral BID    [Held by provider] predniSONE  1 mg Oral QAM    predniSONE  10 mg Oral Daily    [Held by provider] predniSONE  5 mg Oral QAM    pregabalin  100 mg Oral BID    senna-docusate  1 tablet Oral BID    Or    senna-docusate  2 tablet Oral BID    sodium chloride (PF)  3 mL Intracatheter Q8H    [Held by provider] triamterene-HCTZ  1 capsule Oral QAM

## 2024-02-04 NOTE — PLAN OF CARE
Problem: Comorbidity Management  Goal: Blood Glucose Levels Within Targeted Range  Outcome: Progressing     Problem: Pain Acute  Goal: Optimal Pain Control and Function  Outcome: Progressing  Intervention: Develop Pain Management Plan  Recent Flowsheet Documentation  Taken 2/4/2024 0214 by Zoey Taylor, RN  Pain Management Interventions: medication (see MAR)  Taken 2/4/2024 0021 by Zoey Taylor, RN  Pain Management Interventions: medication (see MAR)      A&Ox4. VSS, on RA. Infrequent cough. LS diminished. C/o generalized back pain. PRN Tylenol and Percocet given; effective. Had some anxiety on shift too; PRN Atarax given. T&R q2 hours. Mepilex in place to coccyx and boot in place to left heel. BG check @ 2271=485. Teran in place; good UOP overnight. PIV SL. Did not oob on shift. Discharge pending tp TCU.

## 2024-02-04 NOTE — PLAN OF CARE
Problem: Adult Inpatient Plan of Care  Goal: Absence of Hospital-Acquired Illness or Injury  Intervention: Prevent Skin Injury  Recent Flowsheet Documentation  Taken 2/4/2024 1600 by Monique Palomo, RN  Body Position:   right   turned  Skin Protection:   adhesive use limited   silicone foam dressing in place  Device Skin Pressure Protection:   absorbent pad utilized/changed   tubing/devices free from skin contact     Problem: Pain Acute  Goal: Optimal Pain Control and Function  Intervention: Develop Pain Management Plan  Recent Flowsheet Documentation  Taken 2/4/2024 1545 by Monique Palomo, RN  Pain Management Interventions:   medication (see MAR)   repositioned   Goal Outcome Evaluation: Pt has chronic back pain. Has been incredibly uncomfortable r/t constipation. Big BM this evening. Constant feeling of having to go. Boot on L foot and mepilex to coccyx changed. For communication, need to use white board.

## 2024-02-04 NOTE — PLAN OF CARE
Problem: Adult Inpatient Plan of Care  Goal: Absence of Hospital-Acquired Illness or Injury  Intervention: Identify and Manage Fall Risk  Recent Flowsheet Documentation  Taken 2/3/2024 2149 by Jahaira Martin RN  Safety Promotion/Fall Prevention:   safety round/check completed   room organization consistent   room near nurse's station   nonskid shoes/slippers when out of bed   mobility aid in reach   clutter free environment maintained  Taken 2/3/2024 2025 by Jahaira Martin RN  Safety Promotion/Fall Prevention:   safety round/check completed   room organization consistent   room near nurse's station   nonskid shoes/slippers when out of bed   mobility aid in reach   clutter free environment maintained     Problem: Adult Inpatient Plan of Care  Goal: Absence of Hospital-Acquired Illness or Injury  Intervention: Prevent Skin Injury  Recent Flowsheet Documentation  Taken 2/3/2024 2149 by Jahaira Martin RN  Body Position:   right   turned  Taken 2/3/2024 2025 by Jahaira Martin RN  Body Position: left  Skin Protection: adhesive use limited  Device Skin Pressure Protection:   absorbent pad utilized/changed   tubing/devices free from skin contact  Taken 2/3/2024 2014 by Jahaira Martin RN  Body Position:   left   turned   legs elevated     Problem: Adult Inpatient Plan of Care  Goal: Optimal Comfort and Wellbeing  Intervention: Monitor Pain and Promote Comfort  Recent Flowsheet Documentation  Taken 2/3/2024 2149 by Jahaira Martin RN  Pain Management Interventions: cold applied  Taken 2/3/2024 2059 by Jahaira Martin RN  Pain Management Interventions: rest  Taken 2/3/2024 2014 by Jahaira Martin RN  Pain Management Interventions: medication (see MAR)   Goal Outcome Evaluation:    Patient alert and oriented x 4, speech clear.  Lungs diminished on room air.  Has occasional coarse cough.  Teran patent urine is straw colored.  Repositioned Q2H  positioned with pillows.  Coccyx has bruise and blanchable surrounding tissue, intact.  Mepilex intact.  Heel has light brown/tan scab no drainage or redness wearing an off load foam boot.  Legs elevated and positioned with pillows.  Scattered bruises on legs and arm, abdomen.  Given [place] 2030 for back and leg pain and ice pack on left foot for tingling.  Discharge to TCU when medically cleared.

## 2024-02-04 NOTE — PLAN OF CARE
Problem: Pain Acute  Goal: Optimal Pain Control and Function  Outcome: Not Progressing  Intervention: Develop Pain Management Plan  Recent Flowsheet Documentation  Taken 2/4/2024 0901 by Jonnathan James RN  Pain Management Interventions: medication (see MAR)  Intervention: Prevent or Manage Pain  Recent Flowsheet Documentation  Taken 2/4/2024 0901 by Jonnathan James RN  Medication Review/Management: medications reviewed     Problem: Pain Acute  Goal: Optimal Pain Control and Function  Intervention: Develop Pain Management Plan  Recent Flowsheet Documentation  Taken 2/4/2024 0901 by Jonnathan James RN  Pain Management Interventions: medication (see MAR)     Problem: Pain Acute  Goal: Optimal Pain Control and Function  Intervention: Prevent or Manage Pain  Recent Flowsheet Documentation  Taken 2/4/2024 0901 by Jonnathan James RN  Medication Review/Management: medications reviewed     Problem: Adult Inpatient Plan of Care  Goal: Optimal Comfort and Wellbeing  Outcome: Progressing  Intervention: Monitor Pain and Promote Comfort  Recent Flowsheet Documentation  Taken 2/4/2024 0901 by Jonnathan James RN  Pain Management Interventions: medication (see MAR)     Problem: Comorbidity Management  Goal: Blood Glucose Levels Within Targeted Range  Outcome: Progressing  Intervention: Monitor and Manage Glycemia  Recent Flowsheet Documentation  Taken 2/4/2024 0901 by Jonnathan James RN  Medication Review/Management: medications reviewed     Problem: Comorbidity Management  Goal: Blood Glucose Levels Within Targeted Range  Intervention: Monitor and Manage Glycemia  Recent Flowsheet Documentation  Taken 2/4/2024 0901 by Jonnathan James RN  Medication Review/Management: medications reviewed   Goal Outcome Evaluation:       PT A&O and VSS. O2 sats maintained above 90% on RA.C/O coccyx and lower back pain with some relief from prn medication, repositioning and reassurance. Mepalex dressing changed on coccyx and  CAM boot in place on left heel and heels floated off pillows throughout shift. Q2H turns. [place] and white board used for communication. Lg, hard BM today. Teran patent, and possibly passing urine. Provider informed and bladder irrigated. No clots found and denies discomfort.  and 203. Call light within reach, alarms on for safety and calls appropriately.

## 2024-02-05 ENCOUNTER — APPOINTMENT (OUTPATIENT)
Dept: OCCUPATIONAL THERAPY | Facility: CLINIC | Age: 84
DRG: 177 | End: 2024-02-05
Payer: MEDICARE

## 2024-02-05 ENCOUNTER — TELEPHONE (OUTPATIENT)
Dept: AUDIOLOGY | Facility: CLINIC | Age: 84
End: 2024-02-05
Payer: MEDICARE

## 2024-02-05 ENCOUNTER — APPOINTMENT (OUTPATIENT)
Dept: PHYSICAL THERAPY | Facility: CLINIC | Age: 84
DRG: 177 | End: 2024-02-05
Payer: MEDICARE

## 2024-02-05 LAB
ALBUMIN UR-MCNC: NEGATIVE MG/DL
APPEARANCE UR: ABNORMAL
BILIRUB UR QL STRIP: NEGATIVE
COLOR UR AUTO: YELLOW
GLUCOSE BLDC GLUCOMTR-MCNC: 121 MG/DL (ref 70–99)
GLUCOSE BLDC GLUCOMTR-MCNC: 132 MG/DL (ref 70–99)
GLUCOSE BLDC GLUCOMTR-MCNC: 187 MG/DL (ref 70–99)
GLUCOSE BLDC GLUCOMTR-MCNC: 311 MG/DL (ref 70–99)
GLUCOSE BLDC GLUCOMTR-MCNC: 75 MG/DL (ref 70–99)
GLUCOSE BLDC GLUCOMTR-MCNC: 82 MG/DL (ref 70–99)
GLUCOSE UR STRIP-MCNC: >1000 MG/DL
HGB BLD-MCNC: 8.6 G/DL (ref 11.7–15.7)
HGB UR QL STRIP: NEGATIVE
HOLD SPECIMEN: NORMAL
KETONES UR STRIP-MCNC: NEGATIVE MG/DL
LEUKOCYTE ESTERASE UR QL STRIP: ABNORMAL
NITRATE UR QL: NEGATIVE
PH UR STRIP: 5.5 [PH] (ref 5–7)
RBC URINE: >182 /HPF
SP GR UR STRIP: 1.01 (ref 1–1.03)
SQUAMOUS EPITHELIAL: 1 /HPF
UROBILINOGEN UR STRIP-MCNC: <2 MG/DL
WBC URINE: 1 /HPF
YEAST #/AREA URNS HPF: ABNORMAL /HPF
YEAST #/AREA URNS HPF: ABNORMAL /HPF

## 2024-02-05 PROCEDURE — 250N000013 HC RX MED GY IP 250 OP 250 PS 637: Performed by: EMERGENCY MEDICINE

## 2024-02-05 PROCEDURE — 81001 URINALYSIS AUTO W/SCOPE: CPT | Performed by: EMERGENCY MEDICINE

## 2024-02-05 PROCEDURE — 36415 COLL VENOUS BLD VENIPUNCTURE: CPT | Performed by: EMERGENCY MEDICINE

## 2024-02-05 PROCEDURE — 120N000001 HC R&B MED SURG/OB

## 2024-02-05 PROCEDURE — 97530 THERAPEUTIC ACTIVITIES: CPT | Mod: GP

## 2024-02-05 PROCEDURE — G0463 HOSPITAL OUTPT CLINIC VISIT: HCPCS

## 2024-02-05 PROCEDURE — 250N000011 HC RX IP 250 OP 636: Performed by: HOSPITALIST

## 2024-02-05 PROCEDURE — 97535 SELF CARE MNGMENT TRAINING: CPT | Mod: GO

## 2024-02-05 PROCEDURE — 250N000012 HC RX MED GY IP 250 OP 636 PS 637: Performed by: EMERGENCY MEDICINE

## 2024-02-05 PROCEDURE — 99232 SBSQ HOSP IP/OBS MODERATE 35: CPT | Performed by: EMERGENCY MEDICINE

## 2024-02-05 PROCEDURE — 85018 HEMOGLOBIN: CPT | Performed by: EMERGENCY MEDICINE

## 2024-02-05 PROCEDURE — 250N000013 HC RX MED GY IP 250 OP 250 PS 637: Performed by: HOSPITALIST

## 2024-02-05 RX ORDER — MULTIVITAMIN,THERAPEUTIC
1 TABLET ORAL DAILY
Status: DISCONTINUED | OUTPATIENT
Start: 2024-02-05 | End: 2024-02-08 | Stop reason: HOSPADM

## 2024-02-05 RX ADMIN — ACETAMINOPHEN 650 MG: 325 TABLET ORAL at 19:24

## 2024-02-05 RX ADMIN — ACETAMINOPHEN 650 MG: 325 TABLET ORAL at 12:26

## 2024-02-05 RX ADMIN — LAMOTRIGINE 100 MG: 100 TABLET ORAL at 08:00

## 2024-02-05 RX ADMIN — THERA TABS 1 TABLET: TAB at 16:21

## 2024-02-05 RX ADMIN — OXYCODONE HYDROCHLORIDE AND ACETAMINOPHEN 1 TABLET: 5; 325 TABLET ORAL at 09:44

## 2024-02-05 RX ADMIN — ENOXAPARIN SODIUM 40 MG: 100 INJECTION SUBCUTANEOUS at 17:10

## 2024-02-05 RX ADMIN — NYSTATIN 500000 UNITS: 100000 SUSPENSION ORAL at 08:00

## 2024-02-05 RX ADMIN — HYDROCORTISONE: 1 CREAM TOPICAL at 08:01

## 2024-02-05 RX ADMIN — OXYCODONE HYDROCHLORIDE AND ACETAMINOPHEN 1 TABLET: 5; 325 TABLET ORAL at 20:19

## 2024-02-05 RX ADMIN — PREDNISONE 10 MG: 5 TABLET ORAL at 08:01

## 2024-02-05 RX ADMIN — PREGABALIN 100 MG: 100 CAPSULE ORAL at 19:24

## 2024-02-05 RX ADMIN — BUSPIRONE HYDROCHLORIDE 7.5 MG: 7.5 TABLET ORAL at 19:24

## 2024-02-05 RX ADMIN — HYDROXYZINE HYDROCHLORIDE 25 MG: 25 TABLET, FILM COATED ORAL at 20:19

## 2024-02-05 RX ADMIN — PREGABALIN 100 MG: 100 CAPSULE ORAL at 08:00

## 2024-02-05 RX ADMIN — PANTOPRAZOLE SODIUM 40 MG: 40 TABLET, DELAYED RELEASE ORAL at 08:00

## 2024-02-05 RX ADMIN — LAMOTRIGINE 100 MG: 100 TABLET ORAL at 19:24

## 2024-02-05 RX ADMIN — GLIPIZIDE 10 MG: 10 TABLET ORAL at 08:00

## 2024-02-05 RX ADMIN — BUSPIRONE HYDROCHLORIDE 7.5 MG: 7.5 TABLET ORAL at 08:00

## 2024-02-05 RX ADMIN — GLIPIZIDE 5 MG: 5 TABLET ORAL at 17:10

## 2024-02-05 RX ADMIN — OXYCODONE HYDROCHLORIDE AND ACETAMINOPHEN 1 TABLET: 5; 325 TABLET ORAL at 16:21

## 2024-02-05 ASSESSMENT — ACTIVITIES OF DAILY LIVING (ADL)
ADLS_ACUITY_SCORE: 49
ADLS_ACUITY_SCORE: 48
ADLS_ACUITY_SCORE: 46
ADLS_ACUITY_SCORE: 45
ADLS_ACUITY_SCORE: 49
ADLS_ACUITY_SCORE: 49
ADLS_ACUITY_SCORE: 50
ADLS_ACUITY_SCORE: 49
ADLS_ACUITY_SCORE: 48
ADLS_ACUITY_SCORE: 49
ADLS_ACUITY_SCORE: 50
ADLS_ACUITY_SCORE: 48

## 2024-02-05 NOTE — PROGRESS NOTES
[facility] Nurse Inpatient Assessment     Consulted for: R heel, perineal/groin fissures, sacral wound    Summary:     Patient History (according to provider note(s):      Sars-Cov-2 infection: Hypoxia noted without respiratory failure. Given baseline prednisone, will choose dexamethasone for therapeutic. Can add additional Tx if no clinical improvement.  Suspect right upper lobe patchy opacity is viral.  Broadened antibiotics if clinically indicated.     Oral candidiasis: Nystatin swish/swallow ordered     Suspected acute cystitis: Empiric ceftriaxone. UA hasn't been collected. Do not suspect she's likely to provide a clean sample.     Microcytic anemia: Repeat CBC in the am. Denies melena. Has hemorrhoids with occasional BRBPR. Clinically monitor and transfuse if indicated.     Right pleural effusion: Trace size. Clinically monitor with hypoxia     Meningioma: Presumed diagnosis on imaging, unchanged from prior     Right heel wound: Wound care consult, antibiotics as above.     Med rec is pending    Assessment:      Pressure Injury Location: L heel (not right as in consult)      1/29 2/5    Last photo: 2/5  Wound type: Pressure Injury     Pressure Injury Stage: Unstageable, present on admission   Wound history/plan of care:   patient lives alone at home but has a care person come in three times per week.    Wound base: 100 % eschar     Palpation of the wound bed: firm      Drainage: none     Description of drainage: none     Measurements (length x width x depth, in cm) 3  x 3 cm     Tunneling N/A     Undermining N/A - dried callus can be gently removed  Periwound skin: Dry/scaly      Color: dusky      Temperature: cool  Odor: none  Pain: moderate and with movement , aching and throbbing  Pain intervention prior to dressing change: slow and gentle cares   Treatment goal: Heal , Protection, and offloading  "pressure  STATUS: evolving  Supplies ordered: supplies stored on unit    My PI Risk Assessment     Sensory Perception: 2 - Very Limited     Moisture: 1 - Constantly moist     Activity: 1 - Bedfast      Mobility: 2 - Very limited     Nutrition: 2 - Probably inadequate      Friction/Shear: 2 - Potential problem      TOTAL: 10  _______________________________________________________________________________________________________________________________________________________________________________________________________    Pressure Injury Location: sacrum      1/29 2/5    Last photo: 2/5  Wound type: Pressure Injury     Pressure Injury Stage: Unstageable, present on admission   Wound history/plan of care:   see above    Wound base: 100 % slough     Palpation of the wound bed: normal      Drainage: none     Description of drainage: none     Measurements (length x width x depth, in cm) 2.1  x .5  x  0 cm to sacrum, 0.6 x 1.3 x 0 cm to the right sacrum- lightening in color and not open     Tunneling N/A     Undermining N/A  Periwound skin: Intact      Color: pink      Temperature: warm  Odor: none  Pain: moderate and facial expression of distress, aching and shooting  Pain intervention prior to dressing change: slow and gentle cares   Treatment goal: Protection  STATUS: evolving  Supplies ordered: ordered medihoney       Treatment Plan:     Pressure Injury Prevention (PIP) Plan:  If patient is declining pressure injury prevention interventions: Explore reason why and address patient's concerns, Educate on pressure injury risk and prevention intervention(s), If patient is still declining, document \"informed refusal\" , and Ensure Care team is aware ( provider, charge nurse, etc)  Mattress: Follow bed algorithm, reassess daily and order specialty mattress, if indicated.  HOB: Maintain at or below 30 degrees, unless contraindicated  Repositioning in bed: " Every 1-2 hours , Left/right positioning; avoid supine, Raise foot of bed prior to raising head of bed, to reduce patient sliding down (shear), and Frequent microturns using TAPS wedges, as patient tolerates  Heels: Keep elevated off mattress and Pillows under calves  Protective Dressing: Sacral Mepilex for prevention (#227250),  especially for the agitated patient   Positioning Equipment: None  Chair positioning: Chair cushion (#114994)    If patient has a buttock pressure injury, or high risk for PI use chair cushion or SPS.  Moisture Management: Perineal cleansing /protection: Follow Incontinence Protocol, Avoid brief in bed, Clean and dry skin folds with bathing , Consider InterDry (#796671) between folds, and Moisturize dry skin  Under Devices: Inspect skin under all medical devices during skin inspection , Ensure tubes are stabilized without tension, and Ensure patient is not lying on medical devices or equipment when repositioned  Ask provider to discontinue device when no longer needed.     L heel  Paint with betadine, allow to dry  Offload heel at all times in bed, pillow under each calf  Do not cover with mepilex    Sacrum  Flush wound with NS and pat dry  Apply medihoney gel to wound base  Cover with mepilex  Change MWF + PRN if soiled, saturated, falls off    Orders: Reviewed and Updated    RECOMMEND PRIMARY TEAM ORDER: None, at this time  Education provided: importance of repositioning, Moisture management, and Off-loading pressure  Discussed plan of care with: Patient, Nurse, and Physician  WOC nurse follow-up plan: weekly  Notify WOC if wound(s) deteriorate.  Nursing to notify the Provider(s) and re-consult the WOC Nurse if new skin concern.    DATA:     Current support surface: Standard  Standard gel/foam mattress (IsoFlex, Atmos air, etc)  Containment of urine/stool: Incontinence Protocol  BMI: Body mass index is 35.38 kg/m .   Active diet order: Orders Placed This Encounter      Regular Diet  Adult     Output: I/O last 3 completed shifts:  In: 1450 [P.O.:1450]  Out: 1250 [Urine:1250]     Labs:   Recent Labs   Lab 02/05/24  0542 02/02/24  0627   HGB 8.6* 8.5*   WBC  --  6.5     Pressure injury risk assessment:   Sensory Perception: 4-->no impairment  Moisture: 4-->rarely moist  Activity: 2-->chairfast  Mobility: 2-->very limited  Nutrition: 3-->adequate  Friction and Shear: 2-->potential problem  Anthony Score: 17    WAYNE LedbetterN RN CWOCN  Pager no longer in use, please contact through [employer] group: [facility]

## 2024-02-05 NOTE — PROGRESS NOTES
[facility] MEDICINE PROGRESS NOTE      Summary: 83 year old female into [facility] on 1/28/2024 after  Presenting for weakness.     ER diagnostics showed a right upper lobe infilitrate.  She was covid  Positive on 1/25. 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  She was put on remdesivir along with  Dexamethasone.      On interview today with the white board she still is tearful over  Her discharge plans.  She wants to be here until she is stronger.    Per phone call by [facility] with her helpful son Davie he is planning  To come Sunday for conversations.    She needs to be able to transfer from the bed to the wheelchair  Before she can return home.      WOC note from today reviewed; chronic heel ulcer    Hospital day number 8    Problem list:     COVID: diagnosed on 1/25; stable; off meds; improved  2.    Acute hypoxic respiratory failure due to number 1  DM2, insulin dependent: lantus 10 units in AM; bolus insulin as needed  Urinary retention, nolasco in place: she will discharge  with it  Anemia, microcytic: stable  Oral candidiasis: nystatin changed to twice daily only  Steroid dependent: due to cochlear hydrops with severe  Hearing impairment: prednisone tomorrow  Chronic wound, right heel: stable; will review with nurse need for specialty mattress  Constipation: miralax twice daily, senna twice daily; add lactulose   Twice daily  10.  Anxiety: valium written on admission for as needed use  11.  Pressure ulcers, sacrum and right heel; daily cares  12.  Respiratory alkalosis: present on admission due to COVID, resolved  13.  Disposition:  TCU acceptance pending        Yolanda Alaniz MD  [facility]  [facility]  Phone: #[phone]  Securely message with the Vocera Web Console (learn more here)  Text page via [facility] Paging/Directory     Interval History/Subjective: per the RN she does not take her   Miralax  regularly      Physical Exam/Objective:  Temp:  [98  F (36.7  C)-98.5  F (36.9  C)] 98  F (36.7  C)  Pulse:  [72-80] 80  Resp:  [16-20] 18  BP: (125-140)/(59-65) 140/65  SpO2:  [95 %-96 %] 96 %  Body mass index is 35.38 kg/m .    GENERAL:  Alert; feeling improved   HEAD:  Normocephalic, without obvious abnormality, atraumatic   EYES:  PERRL, conjunctiva/corneas clear, no scleral icterus, EOM's intact   NOSE: Nares normal, septum midline, mucosa normal, no drainage   THROAT: Lips, mucosa, and tongue normal; teeth and gums normal, mouth moist   NECK: Supple, symmetrical, trachea midline   BACK:   Symmetric, no curvature, ROM normal   LUNGS:   Clear to auscultation bilaterally, no rales, rhonchi, or wheezing, symmetric chest rise on inhalation, respirations unlabored   CHEST WALL:  No tenderness or deformity   HEART:  Regular rate and rhythm, S1 and S2 normal, no murmur, rub, or gallop    ABDOMEN:   Soft, full; nonfocal   EXTREMITIES: Extremities normal, atraumatic, no cyanosis or edema    SKIN: Dry to touch, no exanthems in the visualized areas   NEURO: Alert, oriented x3, moves all four extremities freely   PSYCH: Cooperative, behavior is appropriate      Data reviewed today: I personally reviewed all new medications, labs, imaging/diagnostics reports over the past 24 hours. Pertinent findings include:    Imaging:   No results found for this or any previous visit (from the past 24 hour(s)).      Labs:      Medications:   Personally Reviewed.  Medications      busPIRone  7.5 mg Oral BID    [Held by provider] empagliflozin  25 mg Oral QAM    enoxaparin ANTICOAGULANT  40 mg Subcutaneous Q24H    glipiZIDE  10 mg Oral Daily with breakfast    glipiZIDE  5 mg Oral Daily with supper    hydrocortisone   Topical BID    insulin aspart  1-10 Units Subcutaneous TID AC    insulin aspart  1-7 Units Subcutaneous At Bedtime    insulin glargine  6 Units Subcutaneous QAM AC    lamoTRIgine  100 mg Oral BID    multivitamin, therapeutic   1 tablet Oral Daily    nystatin  500,000 Units Swish & Swallow BID    pantoprazole  40 mg Oral Daily    polyethylene glycol  17 g Oral BID    [Held by provider] predniSONE  1 mg Oral QAM    predniSONE  10 mg Oral Daily    [Held by provider] predniSONE  5 mg Oral QAM    pregabalin  100 mg Oral BID    senna-docusate  1 tablet Oral BID    Or    senna-docusate  2 tablet Oral BID    sodium chloride (PF)  3 mL Intracatheter Q8H    [Held by provider] triamterene-HCTZ  1 capsule Oral QAM

## 2024-02-05 NOTE — PLAN OF CARE
Goal Outcome Evaluation:    COVID precautions removed via IP referral; conditions met. A & O x 4. VSS. RA. Back pain controlled with PRN Percocet & Tylenol. Ongoing stools x 3. Miralax/Senna held this morning. IV SL; flushing. Teran intact. Wound care provided to left heel & coccyx, as ordered. Blood sugars: 75 (82 on recheck after meal), 132 and 311. Q2 turns provided for skin integrity/comfort. Regular diet. Assist 1 with walker/GB/pivot to [facility]. Alarms on for safety. Call light education provided. Discharge pending placement.    Problem: Gas Exchange Impaired  Goal: Optimal Gas Exchange  Outcome: Adequate for Care Transition  Intervention: Optimize Oxygenation and Ventilation  Recent Flowsheet Documentation  Taken 2/5/2024 0944 by Selina Valentin RN  Head of Bed (HOB) Positioning: HOB at 30 degrees  Taken 2/5/2024 0800 by Selina Valentin RN  Head of Bed (HOB) Positioning: HOB at 30 degrees     Problem: Pain Acute  Goal: Optimal Pain Control and Function  Outcome: Adequate for Care Transition  Intervention: Prevent or Manage Pain  Recent Flowsheet Documentation  Taken 2/5/2024 0800 by Selina Valentin RN  Medication Review/Management: medications reviewed     Problem: Comorbidity Management  Goal: Blood Glucose Levels Within Targeted Range  Outcome: Progressing  Intervention: Monitor and Manage Glycemia  Recent Flowsheet Documentation  Taken 2/5/2024 0800 by Selina Valentin, RN  Medication Review/Management: medications reviewed

## 2024-02-05 NOTE — CONSULTS
Consult notes relay patient meets criteria in the special precautions duration table shown below and Dr. Alaniz' notes 02/03/24 are supportive of Recovered COVID status as 2/5/24 is day 11 post infection. Per IP review, the documented details support an update to Recovered COVID status and safe discontinuation of special precautions at this time.     .Patient Name: Jumana Yang   MRN: [id number]   Admit Date: 1/28/2024    Current Infection Status: COVID-19   Current Isolation Status: Special Precautions     Review of COVID-19 status and required isolation precautions    Refer to Special Precautions Duration and Period of Transmissibility Guideline, in Policy Tech.        Involved parties: Sudha Adams, Infection Prevention and Other care team role who entered consult to IP .    Criteria used to make decision: Lab Dates: 01/25/2024 .    The involved parties have reviewed this patient's chart per the Special Precautions Duration and Period of Transmissibility guideline and have taken the following action:     DECISION - OPTION 1: Patient meets all the criteria for Special Precautions isolation discontinuation and this writer will resolve the COVID-19 infection flag and isolation status, due to: Immunocompetent Symptomatic: Mild or Moderate: 10 days since symptoms began AND greater than or equal to 24hrs since last fever (without fever-reducing meds) AND COVID-19 symptoms have substantially improved. .       Sudha Adams, Infection Prevention .9:37 AM  .2/5/2024

## 2024-02-05 NOTE — PROGRESS NOTES
"CLINICAL NUTRITION SERVICES - ASSESSMENT NOTE     Nutrition Prescription    RECOMMENDATIONS FOR MDs/PROVIDERS TO ORDER:  May benefit from a consistent carbohydrate diet; I did not adjust at this time to liberalize intake     Malnutrition Status:    Does not meet criteria     Recommendations already ordered by Registered Dietitian (RD):  Chocolate glucerna daily, she likes it on ice   Start a daily MVI     Future/Additional Recommendations:  Will monitor progress towards goals     REASON FOR ASSESSMENT  Jumana Yang is a/an 83 year old female assessed by the dietitian for LOS    Pertinent Medical Admission/History: Covid, respiratory failure, urinary retention, DM2, oral candidiasis, steroid dependent, pressure ulcers, constipation, pt is [facility]    NUTRITION HISTORY  Allergies: shellfish allergy  Pt lives alone in her home   I spoke with pts son. Pt has not been on glucerna before. Intake is down due to constipation. She is open to supplements      CURRENT NUTRITION ORDERS  Diet: Regular  Intake/Tolerance: on 2/4 pt ordered 715kcal and 37g protein; intake 75%. Not yet meeting estimated needs     PHYSICAL FINDINGS  See malnutrition section below.  Per flowsheet:  Edema- +1 BLE   GI- abdominal fullness, constipation, last BM on 2/4  Skin- sacrum and heel wound  Pain- 7/10 pain   Oral- pt with upper dentures     LABS  Labs reviewed, glucose-299, 248, 121    MEDICATIONS  Medications reviewed, glucotrol, novolog, lantus, lactulose, protonix, miralax, prednisone, remdesivir    ANTHROPOMETRICS  Height: 5'4\"  Most Recent Weight: 93.5 kg (206 lb 2.1 oz)    IBW: 54 kg  BMI: Obesity Grade II BMI 35-39.9  Weight History:   Wt Readings from Last 10 Encounters:   02/05/24 93.5 kg (206 lb 2.1 oz)   01/24/24 83.9 kg (185 lb)   10/06/23 86.2 kg (190 lb)   09/20/23 86.2 kg (190 lb)   07/17/23 81.6 kg (180 lb)   05/22/23 88 kg (194 lb)   04/17/23 90.1 kg (198 lb 9.6 oz)   04/14/23 90.1 kg (198 lb 9.6 oz)   04/11/23 90.1 kg (198 lb 9.6 oz) "   04/05/23 89.8 kg (198 lb)         ASSESSED NUTRITION NEEDS  Dosing Weight: 54 kg  Estimated Energy Needs: 9600-5952 kcals/day (22 - 25 kcals/kg)  Justification: Obese  Estimated Protein Needs: 77-96 grams protein/day (1.2 - 1.5 grams of pro/kg using adj BW 64kg)  Justification: Obesity guidelines and Wound healing  Estimated Fluid Needs: 3492-8716 mL/day (25 - 30 mL/kg)   Justification: Maintenance        MALNUTRITION:  % Weight Loss:  None noted  % Intake:  No decreased intake noted  Subcutaneous Fat Loss:  none noted   Muscle Loss:  none noted   Fluid Retention:  Mild     Malnutrition Diagnosis: Does not meet criteria      NUTRITION DIAGNOSIS  Increased nutrient needs  related to wound as evidenced by nonhealing wounds and reduced intake      INTERVENTIONS  Implementation  Chocolate glucerna daily, she likes it on ice   Start a daily MVI   May benefit from a consistent carbohydrate diet; I did not adjust at this time to liberalize intake   Education Needs- none at this time     Goals  Patient to consume % of nutritionally adequate meals three times per day, or the equivalent with supplements/snacks.  Promote healing   Glycemic control wnl     Monitoring/Evaluation  Will monitor po, skin, labs

## 2024-02-05 NOTE — PLAN OF CARE
"  Problem: Risk for Delirium  Goal: Improved Behavioral Control  Outcome: Progressing  Intervention: Minimize Safety Risk  Recent Flowsheet Documentation  Taken 2/5/2024 0236 by Pearl Fairbanks RN  Enhanced Safety Measures: room near unit station  Taken 2/4/2024 2034 by Pearl Fairbanks RN  Enhanced Safety Measures: room near unit station     Problem: Adult Inpatient Plan of Care  Goal: Optimal Comfort and Wellbeing  Outcome: Progressing   Goal Outcome Evaluation:    Pt alert and oriented. VSS. Complains of abd pain 7/10. Pt had a large, hard BM at beginning of shift and another soft BM later in the shift. When assisting pt with bed change, pt grabbed her feces with her bare hand and stated \"look! It just keeps coming\". RN used a wipe to grab feces and throw away. RN gave pt a wipe to wipe her hands while RN and aid cleaned her up During this, pt asked for ice chips. Aid used a spoon to give ice chips but pt declined the spoon, instead she reached her hand, with the feces on it, into the cup, grabbed ice chips and put them in her mouth. Pt pivots to the commode. Pt calls appropriately. RN was told to use the white board for communication but pt declined the use of it and responded to RN's states questions without the use of the white board. Call light within reach, bed in lowest position, bed alarms on.    "

## 2024-02-05 NOTE — TELEPHONE ENCOUNTER
Walk-in hearing aid services on 2/5/24: The patient's son came to the clinic to inquire about the status of the replacement Claudio TRISTAN  for his mom.  The patient had seen Dr. Kavita Valerio on 1/23/24 and documentation indicated a replacement  was going to be ordered.  A record of the order could not be found in the online ordering portal so a new order was placed today.  The patient's son will be called as soon as possible to  the new  from the clinic.

## 2024-02-06 ENCOUNTER — APPOINTMENT (OUTPATIENT)
Dept: OCCUPATIONAL THERAPY | Facility: CLINIC | Age: 84
DRG: 177 | End: 2024-02-06
Payer: MEDICARE

## 2024-02-06 LAB
CREAT SERPL-MCNC: 0.81 MG/DL (ref 0.51–0.95)
EGFRCR SERPLBLD CKD-EPI 2021: 73 ML/MIN/1.73M2
GLUCOSE BLDC GLUCOMTR-MCNC: 105 MG/DL (ref 70–99)
GLUCOSE BLDC GLUCOMTR-MCNC: 163 MG/DL (ref 70–99)
GLUCOSE BLDC GLUCOMTR-MCNC: 217 MG/DL (ref 70–99)
GLUCOSE BLDC GLUCOMTR-MCNC: 346 MG/DL (ref 70–99)
GLUCOSE BLDC GLUCOMTR-MCNC: 82 MG/DL (ref 70–99)
HOLD SPECIMEN: NORMAL

## 2024-02-06 PROCEDURE — 250N000013 HC RX MED GY IP 250 OP 250 PS 637: Performed by: HOSPITALIST

## 2024-02-06 PROCEDURE — 250N000013 HC RX MED GY IP 250 OP 250 PS 637: Performed by: EMERGENCY MEDICINE

## 2024-02-06 PROCEDURE — 36415 COLL VENOUS BLD VENIPUNCTURE: CPT | Performed by: HOSPITALIST

## 2024-02-06 PROCEDURE — 97535 SELF CARE MNGMENT TRAINING: CPT | Mod: GO

## 2024-02-06 PROCEDURE — 99232 SBSQ HOSP IP/OBS MODERATE 35: CPT | Performed by: INTERNAL MEDICINE

## 2024-02-06 PROCEDURE — 250N000011 HC RX IP 250 OP 636: Performed by: HOSPITALIST

## 2024-02-06 PROCEDURE — 120N000001 HC R&B MED SURG/OB

## 2024-02-06 PROCEDURE — 82565 ASSAY OF CREATININE: CPT | Performed by: HOSPITALIST

## 2024-02-06 PROCEDURE — 250N000013 HC RX MED GY IP 250 OP 250 PS 637: Performed by: FAMILY MEDICINE

## 2024-02-06 PROCEDURE — 250N000012 HC RX MED GY IP 250 OP 636 PS 637: Performed by: EMERGENCY MEDICINE

## 2024-02-06 RX ORDER — POLYETHYLENE GLYCOL 3350 17 G/17G
17 POWDER, FOR SOLUTION ORAL DAILY
Qty: 510 G | Refills: 0 | Status: SHIPPED | OUTPATIENT
Start: 2024-02-06 | End: 2024-02-08

## 2024-02-06 RX ORDER — METFORMIN HCL 500 MG
500 TABLET, EXTENDED RELEASE 24 HR ORAL
Status: DISCONTINUED | OUTPATIENT
Start: 2024-02-07 | End: 2024-02-08 | Stop reason: HOSPADM

## 2024-02-06 RX ORDER — OXYCODONE AND ACETAMINOPHEN 5; 325 MG/1; MG/1
1 TABLET ORAL 4 TIMES DAILY PRN
Qty: 5 TABLET | Refills: 0 | Status: SHIPPED | OUTPATIENT
Start: 2024-02-06

## 2024-02-06 RX ADMIN — OXYCODONE HYDROCHLORIDE AND ACETAMINOPHEN 1 TABLET: 5; 325 TABLET ORAL at 22:46

## 2024-02-06 RX ADMIN — OXYCODONE HYDROCHLORIDE AND ACETAMINOPHEN 1 TABLET: 5; 325 TABLET ORAL at 09:26

## 2024-02-06 RX ADMIN — GLIPIZIDE 5 MG: 5 TABLET ORAL at 16:12

## 2024-02-06 RX ADMIN — THERA TABS 1 TABLET: TAB at 09:28

## 2024-02-06 RX ADMIN — PREGABALIN 100 MG: 100 CAPSULE ORAL at 19:27

## 2024-02-06 RX ADMIN — NYSTATIN 500000 UNITS: 100000 SUSPENSION ORAL at 21:09

## 2024-02-06 RX ADMIN — BUSPIRONE HYDROCHLORIDE 7.5 MG: 7.5 TABLET ORAL at 09:28

## 2024-02-06 RX ADMIN — POLYETHYLENE GLYCOL 3350 17 G: 17 POWDER, FOR SOLUTION ORAL at 21:09

## 2024-02-06 RX ADMIN — ACETAMINOPHEN 650 MG: 325 TABLET ORAL at 19:38

## 2024-02-06 RX ADMIN — LAMOTRIGINE 100 MG: 100 TABLET ORAL at 09:26

## 2024-02-06 RX ADMIN — SENNOSIDES AND DOCUSATE SODIUM 2 TABLET: 8.6; 5 TABLET ORAL at 21:09

## 2024-02-06 RX ADMIN — PANTOPRAZOLE SODIUM 40 MG: 40 TABLET, DELAYED RELEASE ORAL at 09:28

## 2024-02-06 RX ADMIN — PREDNISONE 10 MG: 5 TABLET ORAL at 09:28

## 2024-02-06 RX ADMIN — Medication 2 MG: at 01:23

## 2024-02-06 RX ADMIN — ENOXAPARIN SODIUM 40 MG: 100 INJECTION SUBCUTANEOUS at 18:04

## 2024-02-06 RX ADMIN — SENNOSIDES AND DOCUSATE SODIUM 2 TABLET: 8.6; 5 TABLET ORAL at 09:26

## 2024-02-06 RX ADMIN — BUSPIRONE HYDROCHLORIDE 7.5 MG: 7.5 TABLET ORAL at 19:26

## 2024-02-06 RX ADMIN — PREGABALIN 100 MG: 100 CAPSULE ORAL at 09:26

## 2024-02-06 RX ADMIN — LAMOTRIGINE 100 MG: 100 TABLET ORAL at 19:27

## 2024-02-06 RX ADMIN — GLIPIZIDE 10 MG: 10 TABLET ORAL at 09:26

## 2024-02-06 RX ADMIN — OXYCODONE HYDROCHLORIDE AND ACETAMINOPHEN 1 TABLET: 5; 325 TABLET ORAL at 16:12

## 2024-02-06 RX ADMIN — NYSTATIN 500000 UNITS: 100000 SUSPENSION ORAL at 09:28

## 2024-02-06 ASSESSMENT — ACTIVITIES OF DAILY LIVING (ADL)
ADLS_ACUITY_SCORE: 49
ADLS_ACUITY_SCORE: 48
ADLS_ACUITY_SCORE: 49
ADLS_ACUITY_SCORE: 49
ADLS_ACUITY_SCORE: 48
ADLS_ACUITY_SCORE: 49
ADLS_ACUITY_SCORE: 49
ADLS_ACUITY_SCORE: 48
ADLS_ACUITY_SCORE: 49

## 2024-02-06 NOTE — PLAN OF CARE
Problem: Adult Inpatient Plan of Care  Goal: Readiness for Transition of Care  Outcome: Progressing     Problem: Adult Inpatient Plan of Care  Goal: Optimal Comfort and Wellbeing  Outcome: Progressing   Pt oriented x4. Having some anxiety over not sleeping overnight, PRN hydroxizine and valium effective. Pt also endorsing severe back pain, controlled with prn percocet. VSS, tolerating room air. X1 incontinent BM overnight. Q2 turns. Awaiting placement.

## 2024-02-06 NOTE — PLAN OF CARE
Goal Outcome Evaluation:    Problem: Adult Inpatient Plan of Care  Goal: Optimal Comfort and Wellbeing  Intervention: Monitor Pain and Promote Comfort  Recent Flowsheet Documentation  Taken 2/6/2024 0926 by Marychuy Cardoso RN  Pain Management Interventions:   medication (see MAR)   repositioned   Pt still c/o pain mostly in back as well and generalized. Given PRN Percocet which is helpful.    Problem: Comorbidity Management  Goal: Blood Glucose Levels Within Targeted Range  Intervention: Monitor and Manage Glycemia  Recent Flowsheet Documentation  Taken 2/6/2024 0830 by Marychuy Cardoso RN  Medication Review/Management: medications reviewed   BG much closer to goal. Only needed sliding scale insulin with lunch. Was able to restart metformin today also.

## 2024-02-06 NOTE — PROGRESS NOTES
Care Management Follow Up    Length of Stay (days): 9    Expected Discharge Date: 02/07/2024     Concerns to be Addressed: discharge planning       Patient plan of care discussed at interdisciplinary rounds: Yes    Anticipated Discharge Disposition: Transitional Care     Anticipated Discharge Services:  TBD    Anticipated Discharge DME: None    Education Provided on the Discharge Plan: Yes (AVS per bedside RN)    Patient/Family in Agreement with the Plan: yes (Davie (son))    Referrals Placed by CM/SW: Post Acute Facilities        Additional Information:  CM reviewed chart. CM updated Davie (son) that patient has an accepting TCU. Davie wants to be the one to tell patient she will discharge to TCU. CM call [facility]  to confirm acceptance and coordinate discharge. 8:25 AM   CM left VM for [facility] admissions to confirm acceptance and coordinate discharge. 9:43 AM      [facility] - now declining patient. CM updated Davie (son). 10:27 AM     Transportation TBD.     Daniella Mcallister RN

## 2024-02-06 NOTE — PLAN OF CARE
Goal Outcome Evaluation:    Alert and oriented. Moves with 1A pivot. Discharge cancelled for today, as TCU is no longer accepting patient. New referrals sent. Percocet given for pain. ACHS sugar check resulted 360 initially, 346 upon recheck for confirmation, 9 units given for correction. Patient stated she had [employer] for lunch, which may have caused the high sugar. Alarms on. IV Saline locked.

## 2024-02-06 NOTE — PROGRESS NOTES
"[facility] Medicine PROGRESS NOTE      Identification/Summary:   83-year-old female with a known recent diagnosis of SARS COVID returns to the hospital with increasing weakness, urinary incontinence, and now hypoxia.  She was treated with remdesivir and steroids.  Had urinary retention, requiring Teran catheter placement.  Awaiting TCU placement.     Sars-Cov-2 infection  acute hypoxemic respiratory failure.   Treated with dexamethasone, steroids  Has been off of the oxygen.     Type 2 diabetes  Continue oral meds  Insulin sliding scale as needed  Started on Lantus here for steroid-induced hyperglycemia     Left heel pressure ulcer  Sacral pressure ulcer  Present on admission.  Wound care saw the patient.    Oral candidiasis  Nystatin swish/swallow ordered     Urinary retention  Oxybutynin, Vesicare discontinued  Seen by urology  Has Teran catheter and plan to discharge with catheter.     Microcytic anemia  Monitor.     Right pleural effusion  Trace size.      Meningioma  Presumed diagnosis on imaging, unchanged from prior     Right heel wound    Diet: Regular Diet Adult  Diet  Snacks/Supplements Adult: Glucerna; With Meals  DVT Prophylaxis:   Lovenox.  Code Status: No CPR- Do NOT Intubate    Clinically Significant Risk Factors              # Hypoalbuminemia: Lowest albumin = 3.4 g/dL at 1/28/2024  1:29 PM, will monitor as appropriate     # Hypertension: Noted on problem list       # DMII: A1C = 8.5 % (Ref range: <5.7 %) within past 6 months   # Obesity: Estimated body mass index is 35.38 kg/m  as calculated from the following:    Height as of 1/24/24: 1.626 m (5' 4\").    Weight as of this encounter: 93.5 kg (206 lb 2.1 oz).      # Financial/Environmental Concerns: none            Anticipated possible discharge in 1 days once placement milestones are met.    Interval History/Subjective:  Laying in bed.  She is hard of hearing.  No other shortness of breath, chest pain, urinary or bowel complaints.  She has " "Teran catheter in place.    Physical Exam/Objective:  Vitals I/O   Vital signs:  Temp: 97.7  F (36.5  C) Temp src: Oral BP: (!) 153/70 Pulse: 82   Resp: 18 SpO2: 92 % O2 Device: None (Room air) Oxygen Delivery: 1 LPM   Weight: 93.5 kg (206 lb 2.1 oz)  Estimated body mass index is 35.38 kg/m  as calculated from the following:    Height as of 1/24/24: 1.626 m (5' 4\").    Weight as of this encounter: 93.5 kg (206 lb 2.1 oz). I/O last 3 completed shifts:  In: 1210 [P.O.:1210]  Out: 1025 [Urine:1025]     Body mass index is 35.38 kg/m .    General Appearance:  Alert, cooperative, no distress   Head:  Normocephalic, atraumatic   Eyes:  PERRL    Throat:  mucosa; moist   Neck: No JVD, thyromegaly   Lungs:   Clear to auscultation bilaterally, respirations unlabored   Chest Wall:  No tenderness or deformity   Heart:  Regular rate and rhythm, S1, S2 normal,no murmur   Abdomen:   Soft, non tender, non distended, bowel sounds present, no guarding or rigidity   Extremities: No edema,  left heel in a boot   Skin: Skin color, texture, turgor normal, no rashes or lesions   Neurologic: Alert and oriented X 3, Moves all 4 extremities       Medications:   Personally Reviewed.   busPIRone  7.5 mg Oral BID    [Held by provider] empagliflozin  25 mg Oral QAM    enoxaparin ANTICOAGULANT  40 mg Subcutaneous Q24H    glipiZIDE  10 mg Oral Daily with breakfast    glipiZIDE  5 mg Oral Daily with supper    hydrocortisone   Topical BID    insulin aspart  1-10 Units Subcutaneous TID AC    insulin aspart  1-7 Units Subcutaneous At Bedtime    insulin glargine  6 Units Subcutaneous QAM AC    lamoTRIgine  100 mg Oral BID    multivitamin, therapeutic  1 tablet Oral Daily    nystatin  500,000 Units Swish & Swallow BID    pantoprazole  40 mg Oral Daily    polyethylene glycol  17 g Oral BID    [Held by provider] predniSONE  1 mg Oral QAM    predniSONE  10 mg Oral Daily    [Held by provider] predniSONE  5 mg Oral QAM    pregabalin  100 mg Oral BID    " senna-docusate  1 tablet Oral BID    Or    senna-docusate  2 tablet Oral BID    sodium chloride (PF)  3 mL Intracatheter Q8H    [Held by provider] triamterene-HCTZ  1 capsule Oral QAM       Data reviewed today: I personally reviewed all new medications, labs, imaging/diagnostics reports over the past 24 hours. Pertinent findings include    Labs:  Most Recent 3 CBC's:  Recent Labs   Lab Test 02/05/24  0542 02/02/24  0627 02/01/24  0653 01/31/24  0557   WBC  --  6.5 7.9 6.3   HGB 8.6* 8.5* 9.2* 8.3*   MCV  --  76* 77* 76*   PLT  --  362 329 284     Most Recent 3 BMP's:  Recent Labs   Lab Test 02/06/24  1231 02/06/24  0812 02/06/24  0622 02/06/24  0211 02/03/24  1222 02/03/24  0910 02/02/24  0833 02/02/24  0627 02/01/24  0832 02/01/24  0653 01/31/24  0638 01/31/24  0557   NA  --   --   --   --   --   --   --  139  --  139  --  137   POTASSIUM  --   --   --   --   --   --   --  4.2  --  4.3  --  4.5   CHLORIDE  --   --   --   --   --   --   --  100  --  99  --  100   CO2  --   --   --   --   --   --   --  30*  --  31*  --  28   BUN  --   --   --   --   --   --   --  23.1*  --  27.4*  --  29.9*   CR  --   --  0.81  --   --  0.90  --  0.90  --  0.90  --  0.92   ANIONGAP  --   --   --   --   --   --   --  9  --  9  --  9   MACY  --   --   --   --   --   --   --  9.5  --  9.9  --  9.5   * 105*  --  82   < >  --    < > 68*   < > 105*   < > 292*    < > = values in this interval not displayed.       Imaging:   No results found for this or any previous visit (from the past 24 hour(s)).     25 MINUTES SPENT BY ME on the date of service doing chart review, history, exam, documentation & further activities per the note.    Rebecca Sutton MD  Hospitalist  [facility]

## 2024-02-07 ENCOUNTER — APPOINTMENT (OUTPATIENT)
Dept: OCCUPATIONAL THERAPY | Facility: CLINIC | Age: 84
DRG: 177 | End: 2024-02-07
Payer: MEDICARE

## 2024-02-07 LAB
GLUCOSE BLDC GLUCOMTR-MCNC: 135 MG/DL (ref 70–99)
GLUCOSE BLDC GLUCOMTR-MCNC: 248 MG/DL (ref 70–99)
GLUCOSE BLDC GLUCOMTR-MCNC: 266 MG/DL (ref 70–99)
GLUCOSE BLDC GLUCOMTR-MCNC: 83 MG/DL (ref 70–99)
GLUCOSE BLDC GLUCOMTR-MCNC: 84 MG/DL (ref 70–99)
GLUCOSE BLDC GLUCOMTR-MCNC: 88 MG/DL (ref 70–99)

## 2024-02-07 PROCEDURE — 97535 SELF CARE MNGMENT TRAINING: CPT | Mod: GO

## 2024-02-07 PROCEDURE — 250N000013 HC RX MED GY IP 250 OP 250 PS 637: Performed by: INTERNAL MEDICINE

## 2024-02-07 PROCEDURE — 250N000012 HC RX MED GY IP 250 OP 636 PS 637: Performed by: EMERGENCY MEDICINE

## 2024-02-07 PROCEDURE — 250N000011 HC RX IP 250 OP 636: Performed by: HOSPITALIST

## 2024-02-07 PROCEDURE — 250N000013 HC RX MED GY IP 250 OP 250 PS 637: Performed by: HOSPITALIST

## 2024-02-07 PROCEDURE — 250N000013 HC RX MED GY IP 250 OP 250 PS 637: Performed by: EMERGENCY MEDICINE

## 2024-02-07 PROCEDURE — 99232 SBSQ HOSP IP/OBS MODERATE 35: CPT | Performed by: INTERNAL MEDICINE

## 2024-02-07 PROCEDURE — 120N000001 HC R&B MED SURG/OB

## 2024-02-07 RX ORDER — BENZOCAINE/MENTHOL 6 MG-10 MG
LOZENGE MUCOUS MEMBRANE 2 TIMES DAILY
Start: 2024-02-07 | End: 2024-02-08

## 2024-02-07 RX ADMIN — GLIPIZIDE 5 MG: 5 TABLET ORAL at 16:21

## 2024-02-07 RX ADMIN — NYSTATIN 500000 UNITS: 100000 SUSPENSION ORAL at 11:11

## 2024-02-07 RX ADMIN — HYDROCORTISONE: 1 CREAM TOPICAL at 11:20

## 2024-02-07 RX ADMIN — LAMOTRIGINE 100 MG: 100 TABLET ORAL at 20:30

## 2024-02-07 RX ADMIN — PREGABALIN 100 MG: 100 CAPSULE ORAL at 11:11

## 2024-02-07 RX ADMIN — PANTOPRAZOLE SODIUM 40 MG: 40 TABLET, DELAYED RELEASE ORAL at 11:12

## 2024-02-07 RX ADMIN — LAMOTRIGINE 100 MG: 100 TABLET ORAL at 11:12

## 2024-02-07 RX ADMIN — PREDNISONE 10 MG: 5 TABLET ORAL at 11:11

## 2024-02-07 RX ADMIN — OXYCODONE HYDROCHLORIDE AND ACETAMINOPHEN 1 TABLET: 5; 325 TABLET ORAL at 08:46

## 2024-02-07 RX ADMIN — ACETAMINOPHEN 650 MG: 325 TABLET ORAL at 16:52

## 2024-02-07 RX ADMIN — HYDROXYZINE HYDROCHLORIDE 25 MG: 25 TABLET, FILM COATED ORAL at 02:12

## 2024-02-07 RX ADMIN — BUSPIRONE HYDROCHLORIDE 7.5 MG: 7.5 TABLET ORAL at 20:30

## 2024-02-07 RX ADMIN — THERA TABS 1 TABLET: TAB at 11:12

## 2024-02-07 RX ADMIN — GLIPIZIDE 10 MG: 10 TABLET ORAL at 11:12

## 2024-02-07 RX ADMIN — OXYCODONE HYDROCHLORIDE AND ACETAMINOPHEN 1 TABLET: 5; 325 TABLET ORAL at 13:12

## 2024-02-07 RX ADMIN — OXYCODONE HYDROCHLORIDE AND ACETAMINOPHEN 1 TABLET: 5; 325 TABLET ORAL at 18:52

## 2024-02-07 RX ADMIN — BUSPIRONE HYDROCHLORIDE 7.5 MG: 7.5 TABLET ORAL at 11:11

## 2024-02-07 RX ADMIN — METFORMIN ER 500 MG 500 MG: 500 TABLET ORAL at 11:12

## 2024-02-07 RX ADMIN — ENOXAPARIN SODIUM 40 MG: 100 INJECTION SUBCUTANEOUS at 16:33

## 2024-02-07 RX ADMIN — PREGABALIN 100 MG: 100 CAPSULE ORAL at 20:30

## 2024-02-07 ASSESSMENT — ACTIVITIES OF DAILY LIVING (ADL)
ADLS_ACUITY_SCORE: 49
ADLS_ACUITY_SCORE: 48
ADLS_ACUITY_SCORE: 52
ADLS_ACUITY_SCORE: 51
ADLS_ACUITY_SCORE: 48
ADLS_ACUITY_SCORE: 51
ADLS_ACUITY_SCORE: 52
ADLS_ACUITY_SCORE: 52
ADLS_ACUITY_SCORE: 48
ADLS_ACUITY_SCORE: 51

## 2024-02-07 NOTE — PROGRESS NOTES
"Care Management Follow Up    Length of Stay (days): 10    Expected Discharge Date: 02/08/2024     Concerns to be Addressed: discharge planning  Coordination with current home care providers vs. TCU placement needed at discharge  Patient plan of care discussed at interdisciplinary rounds: Yes    Anticipated Discharge Disposition: Transitional Care     Anticipated Discharge Services:  (TBD)  Anticipated Discharge DME: None    Patient/family educated on Medicare website which has current facility and service quality ratings: no  Education Provided on the Discharge Plan: Yes (AVS per bedside RN)  Patient/Family in Agreement with the Plan: yes (Davie (son))    Referrals Placed by CM/SW: Post Acute Facilities  Private pay costs discussed: Not applicable    Additional Information:    8:59 AM  Seeking TCU at discharge.  Spoke to jonas Broderick, who will review for more choices.  Referral sent to [facility] per his request.    [facility] - Called; left voicemail.  [facility] - Called; admissions will review.  [facility] - Called; left voicemail. -9:13 AM Per Ketan, declined - no beds available.    10:06 AM  Additional TCU choices from jonas Broderick:  [facility] - declined  [facility] - declined  [facility] - sent.  [facility] will review - no TCU beds available today.  [facility] - sent. Called; [facility] will review. -1:15 PM Declined.  [facility] - sent.  Called; admissions will review. 1:03 PM - declined.  [facility] - declined   [facility] - declined - no TCU at this facility  [facility] - declined  [facility] - sent. 2:52 PM - declined    Social hx per CM consult 1/28: \"Pt resides in a Public Housing apartment alone. Jonas Broderick is the main contact and primary caregiver who lives three miles away from the pt. Pt has been struggling since somewhat recently returning home from a TCU. Davie stated that pt has PCA hours available but, they are having trouble staffing the position. Pt also " "reportedly gets PT and SN services from a home care agency. Davie/pt were unsure of which company provides the PCA services vs the PT/SN services. [facility] and [facility] are the two companies involved. They have both been contacted by writer to assist with coordination of discharge services, unless pt needs TCU placement. Pt has a privete-pay neighbor/caregiver named Hernán who assists when able as well.  \"    11:23 AM  [facility] TCU requested details on pt's baseline.    Jonas Broderick - reports pt's baseline: pt spends most of the day in a wheelchair, pivots self to/from bed/toilet.  PCA through [employer], covered through insurance 9 hours/week.  Private pay caregiver \"neighbor/friend\" is approx 5-7 hours/day.  Pt was able to ambulate 20-30 feet with a gait belt and walker and assistx1 while son/PT is present.    Update given to Lauro at [facility]CU.    1:33 PM  [facility] - Per Jaimie, they can accept this pt today 2/7.  Pt must arrive by 1730pm, discharge orders due by 1500pm.    Update given to Dr. Sutton.  Update given to jonas Broderick.    2:22 PM  Per Dr. Sutton, urology consult pending per jonas Broderick's request.  No discharge today.    Jonas Broderick - reports he will take the day off work tomorrow.  He is hoping he can discharge pt home with homecare rather than to TCU tomorrow.    Update given to Jaimie at TCU.    Needs PAS.  Transportation - via jonas Broderick at time of discharge.    Ji Arango RN    "

## 2024-02-07 NOTE — PLAN OF CARE
Goal Outcome Evaluation:  Patient is alert and oriented x4. VSS on room air. Afebrile. No complains of chest pain nor n/v. Reports dyspnea on exertion. Complains of chronic back pain, anxiety, and lower extremity neuropathy. Patient given medication per MAR with good effect. Reposition q2 hrs.  Reports assist of 1 with walker and gait belt but patient has not gotten up this shift. [place] and communicates with white board. Call light within reach.         Problem: Adult Inpatient Plan of Care  Goal: Absence of Hospital-Acquired Illness or Injury  Intervention: Identify and Manage Fall Risk  Recent Flowsheet Documentation  Taken 2/6/2024 2331 by Brianna Ruggiero RN  Safety Promotion/Fall Prevention:   activity supervised   assistive device/personal items within reach   clutter free environment maintained   increased rounding and observation   increase visualization of patient   lighting adjusted   mobility aid in reach   nonskid shoes/slippers when out of bed   room door open   room near nurse's station   room organization consistent   safety round/check completed  Intervention: Prevent Skin Injury  Recent Flowsheet Documentation  Taken 2/7/2024 0215 by Brianna Ruggiero RN  Body Position:   turned   right   heels elevated  Taken 2/6/2024 2331 by Brianna Ruggiero RN  Body Position:   turned   left  Skin Protection: adhesive use limited  Device Skin Pressure Protection:   adhesive use limited   tubing/devices free from skin contact  Intervention: Prevent Infection  Recent Flowsheet Documentation  Taken 2/6/2024 2331 by Brianna Ruggiero RN  Infection Prevention:   rest/sleep promoted   single patient room provided   personal protective equipment utilized   hand hygiene promoted   equipment surfaces disinfected  Goal: Optimal Comfort and Wellbeing  Intervention: Monitor Pain and Promote Comfort  Recent Flowsheet Documentation  Taken 2/6/2024 2331 by Brianna Ruggiero RN  Pain Management Interventions:   pillow support provided   relaxation  techniques promoted   repositioned

## 2024-02-07 NOTE — PLAN OF CARE
Problem: Anxiety  Goal: Anxiety Reduction or Resolution  Intervention: Promote Anxiety Reduction  Recent Flowsheet Documentation  Taken 2/7/2024 0900 by Tawnya Adorno, RN  Supportive Measures: active listening utilized     Problem: Pain Acute  Goal: Optimal Pain Control and Function  Intervention: Develop Pain Management Plan  Recent Flowsheet Documentation  Taken 2/7/2024 0842 by Tawnya Adorno, RN  Pain Management Interventions: medication (see MAR)     Pt A/O x 4. [place] has hearing aids present but still [place]. White board utilized at times when pt needs. VSS on RA. Pain present in low back prn meds given with relief. Tolerating po intake. Nolasco in place patent with good output. Pt has had 3-4 soft/loose BM this shift. Up A2 to the commode. Up in chair for about an hour. Q 2 turns due to coxxyx wound. Mepi in place. Alarms on for safety. Call light in reach. Discharge pending urology input regarding nolasco.    Tawnya Adorno, RN     Home

## 2024-02-07 NOTE — PLAN OF CARE
Problem: Comorbidity Management  Goal: Blood Glucose Levels Within Targeted Range  Outcome: Progressing  Intervention: Monitor and Manage Glycemia  Recent Flowsheet Documentation  Taken 2/6/2024 1938 by Ana Paula Harrison RN  Medication Review/Management: medications reviewed     Problem: Oral Intake Inadequate  Goal: Improved Oral Intake  Outcome: Progressing   Goal Outcome Evaluation:  Patient A&O x4, able to make needs known. Anxious at times. [place], hearing aids at bedside. VSS on RA. Pain rated 7/10 in back. Pain controlled with PRN Tylenol and Percocet. Right PIV SL. Regular diet, tolerating well. Complains of LOPEZ. Lung sounds diminished. Infrequent, productive cough. Abdomen tender upon palpation. Numbness and/or tingling in BUE and BLE. +1 BLE edema. Wounds on sacrum and left heel, mepilex covering areas. Teran patent and draining. No BM during shift. ACHS sugar checks, insulin coverage per sliding scale. Assist of 1 with walker and gait belt, not out of bed during shift. Discharge pending.

## 2024-02-07 NOTE — PROGRESS NOTES
"[facility] Medicine PROGRESS NOTE      Identification/Summary:   83-year-old female with a known recent diagnosis of SARS COVID returns to the hospital with increasing weakness, urinary incontinence, and now hypoxia.  She was treated with remdesivir and steroids.  Weaned off of the oxygen.  Had urinary retention, requiring Teran catheter placement.  Was seen by urology and recommended to discharge with Teran catheter and follow-up as outpatient.  Medically stable.  Awaiting TCU placement.    She is a difficult TCU placement given her history and son requesting if Teran can be removed, so he could plan on her discharging home.  Urology consulted today.     Sars-Cov-2 infection  acute hypoxemic respiratory failure.   Treated with dexamethasone, steroids  Has been off of the oxygen.     Type 2 diabetes  Continue oral meds  Insulin sliding scale as needed  Started on Lantus here for steroid-induced hyperglycemia     Left heel pressure ulcer  Sacral pressure ulcer  Present on admission.  Wound care saw the patient.    Oral candidiasis  Nystatin swish/swallow ordered     Urinary retention  Oxybutynin, Vesicare discontinued  Seen by urology  Has Teran catheter and plan to discharge with catheter.  Reconsult urology today for possible void trial     Microcytic anemia  Monitor.     Right pleural effusion  Trace size.      Meningioma  Presumed diagnosis on imaging, unchanged from prior      Diet: Regular Diet Adult  Diet  Snacks/Supplements Adult: Glucerna; With Meals  DVT Prophylaxis:   Lovenox.  Code Status: No CPR- Do NOT Intubate    Clinically Significant Risk Factors              # Hypoalbuminemia: Lowest albumin = 3.4 g/dL at 1/28/2024  1:29 PM, will monitor as appropriate     # Hypertension: Noted on problem list       # DMII: A1C = 8.5 % (Ref range: <5.7 %) within past 6 months   # Obesity: Estimated body mass index is 35.38 kg/m  as calculated from the following:    Height as of 1/24/24: 1.626 m (5' 4\").    " "Weight as of this encounter: 93.5 kg (206 lb 2.1 oz).        # Financial/Environmental Concerns: none            Anticipated possible discharge in 1 days once placement milestones are met.    Interval History/Subjective:  Laying in bed.  She is hard of hearing.  No other shortness of breath, chest pain, urinary or bowel complaints.  She has Teran catheter in place.    Physical Exam/Objective:  Vitals I/O   Vital signs:  Temp: 98.4  F (36.9  C) Temp src: Oral BP: (!) 149/65 Pulse: 80   Resp: 18 SpO2: 95 % O2 Device: None (Room air) Oxygen Delivery: 1 LPM   Weight: 93.5 kg (206 lb 2.1 oz)  Estimated body mass index is 35.38 kg/m  as calculated from the following:    Height as of 1/24/24: 1.626 m (5' 4\").    Weight as of this encounter: 93.5 kg (206 lb 2.1 oz). I/O last 3 completed shifts:  In: 720 [P.O.:720]  Out: 1975 [Urine:1975]     Body mass index is 35.38 kg/m .    General Appearance:  Alert, cooperative, no distress   Head:  Normocephalic, atraumatic   Eyes:  PERRL    Throat:  mucosa; moist   Neck: No JVD, thyromegaly   Lungs:   Clear to auscultation bilaterally, respirations unlabored   Chest Wall:  No tenderness or deformity   Heart:  Regular rate and rhythm, S1, S2 normal,no murmur   Abdomen:   Soft, non tender, non distended, bowel sounds present, no guarding or rigidity   Extremities: No edema,  left heel in a boot   Skin: Skin color, texture, turgor normal, no rashes or lesions   Neurologic: Alert and oriented X 3, Moves all 4 extremities       Medications:   Personally Reviewed.   busPIRone  7.5 mg Oral BID    [Held by provider] empagliflozin  25 mg Oral QAM    enoxaparin ANTICOAGULANT  40 mg Subcutaneous Q24H    glipiZIDE  10 mg Oral Daily with breakfast    glipiZIDE  5 mg Oral Daily with supper    hydrocortisone   Topical BID    insulin aspart  1-10 Units Subcutaneous TID AC    insulin aspart  1-7 Units Subcutaneous At Bedtime    lamoTRIgine  100 mg Oral BID    metFORMIN  500 mg Oral Daily with " breakfast    multivitamin, therapeutic  1 tablet Oral Daily    nystatin  500,000 Units Swish & Swallow BID    pantoprazole  40 mg Oral Daily    polyethylene glycol  17 g Oral BID    predniSONE  1 mg Oral QAM    predniSONE  5 mg Oral QAM    pregabalin  100 mg Oral BID    senna-docusate  1 tablet Oral BID    Or    senna-docusate  2 tablet Oral BID    sodium chloride (PF)  3 mL Intracatheter Q8H    triamterene-HCTZ  1 capsule Oral QAM       Data reviewed today: I personally reviewed all new medications, labs, imaging/diagnostics reports over the past 24 hours. Pertinent findings include    Labs:  Most Recent 3 CBC's:  Recent Labs   Lab Test 02/05/24  0542 02/02/24  0627 02/01/24  0653 01/31/24  0557   WBC  --  6.5 7.9 6.3   HGB 8.6* 8.5* 9.2* 8.3*   MCV  --  76* 77* 76*   PLT  --  362 329 284     Most Recent 3 BMP's:  Recent Labs   Lab Test 02/07/24  1030 02/07/24  0832 02/07/24  0529 02/06/24  0812 02/06/24  0622 02/03/24  1222 02/03/24  0910 02/02/24  0833 02/02/24  0627 02/01/24  0832 02/01/24  0653 01/31/24  0638 01/31/24  0557   NA  --   --   --   --   --   --   --   --  139  --  139  --  137   POTASSIUM  --   --   --   --   --   --   --   --  4.2  --  4.3  --  4.5   CHLORIDE  --   --   --   --   --   --   --   --  100  --  99  --  100   CO2  --   --   --   --   --   --   --   --  30*  --  31*  --  28   BUN  --   --   --   --   --   --   --   --  23.1*  --  27.4*  --  29.9*   CR  --   --   --   --  0.81  --  0.90  --  0.90  --  0.90  --  0.92   ANIONGAP  --   --   --   --   --   --   --   --  9  --  9  --  9   MACY  --   --   --   --   --   --   --   --  9.5  --  9.9  --  9.5   * 88 83   < >  --    < >  --    < > 68*   < > 105*   < > 292*    < > = values in this interval not displayed.       Imaging:   No results found for this or any previous visit (from the past 24 hour(s)).     35 MINUTES SPENT BY ME on the date of service doing chart review, history, exam, documentation & further activities per the  note.    Rebecca Sutton MD  Hospitalist  [facility]

## 2024-02-08 VITALS
DIASTOLIC BLOOD PRESSURE: 77 MMHG | WEIGHT: 206.57 LBS | OXYGEN SATURATION: 95 % | RESPIRATION RATE: 16 BRPM | BODY MASS INDEX: 35.46 KG/M2 | HEART RATE: 87 BPM | TEMPERATURE: 97.9 F | SYSTOLIC BLOOD PRESSURE: 154 MMHG

## 2024-02-08 PROBLEM — J96.01 ACUTE RESPIRATORY FAILURE WITH HYPOXIA (H): Status: ACTIVE | Noted: 2024-02-08

## 2024-02-08 PROBLEM — R33.9 URINARY RETENTION: Status: ACTIVE | Noted: 2024-02-08

## 2024-02-08 PROBLEM — B37.0 CANDIDIASIS OF MOUTH: Status: ACTIVE | Noted: 2024-02-08

## 2024-02-08 LAB
GLUCOSE BLDC GLUCOMTR-MCNC: 111 MG/DL (ref 70–99)
GLUCOSE BLDC GLUCOMTR-MCNC: 176 MG/DL (ref 70–99)
GLUCOSE BLDC GLUCOMTR-MCNC: 57 MG/DL (ref 70–99)

## 2024-02-08 PROCEDURE — 250N000012 HC RX MED GY IP 250 OP 636 PS 637: Performed by: INTERNAL MEDICINE

## 2024-02-08 PROCEDURE — 99238 HOSP IP/OBS DSCHRG MGMT 30/<: CPT | Performed by: HOSPITALIST

## 2024-02-08 PROCEDURE — 250N000013 HC RX MED GY IP 250 OP 250 PS 637: Performed by: EMERGENCY MEDICINE

## 2024-02-08 PROCEDURE — 250N000013 HC RX MED GY IP 250 OP 250 PS 637: Performed by: INTERNAL MEDICINE

## 2024-02-08 PROCEDURE — 250N000013 HC RX MED GY IP 250 OP 250 PS 637: Performed by: FAMILY MEDICINE

## 2024-02-08 PROCEDURE — 250N000013 HC RX MED GY IP 250 OP 250 PS 637: Performed by: HOSPITALIST

## 2024-02-08 PROCEDURE — 250N000011 HC RX IP 250 OP 636: Performed by: EMERGENCY MEDICINE

## 2024-02-08 RX ORDER — POLYETHYLENE GLYCOL 3350 17 G/17G
17 POWDER, FOR SOLUTION ORAL DAILY
Start: 2024-02-08

## 2024-02-08 RX ORDER — BENZOCAINE/MENTHOL 6 MG-10 MG
LOZENGE MUCOUS MEMBRANE 2 TIMES DAILY PRN
Start: 2024-02-08

## 2024-02-08 RX ADMIN — LAMOTRIGINE 100 MG: 100 TABLET ORAL at 08:50

## 2024-02-08 RX ADMIN — Medication 2 MG: at 00:08

## 2024-02-08 RX ADMIN — PREGABALIN 100 MG: 100 CAPSULE ORAL at 08:50

## 2024-02-08 RX ADMIN — METFORMIN ER 500 MG 500 MG: 500 TABLET ORAL at 08:50

## 2024-02-08 RX ADMIN — POLYETHYLENE GLYCOL 3350 17 G: 17 POWDER, FOR SOLUTION ORAL at 08:49

## 2024-02-08 RX ADMIN — TRIAMTERENE AND HYDROCHLOROTHIAZIDE 1 CAPSULE: 37.5; 25 CAPSULE ORAL at 08:50

## 2024-02-08 RX ADMIN — BUSPIRONE HYDROCHLORIDE 7.5 MG: 7.5 TABLET ORAL at 08:50

## 2024-02-08 RX ADMIN — HYDROXYZINE HYDROCHLORIDE 25 MG: 25 TABLET, FILM COATED ORAL at 04:13

## 2024-02-08 RX ADMIN — PREDNISONE 5 MG: 5 TABLET ORAL at 08:51

## 2024-02-08 RX ADMIN — NYSTATIN 500000 UNITS: 100000 SUSPENSION ORAL at 08:51

## 2024-02-08 RX ADMIN — OXYCODONE HYDROCHLORIDE AND ACETAMINOPHEN 1 TABLET: 5; 325 TABLET ORAL at 00:08

## 2024-02-08 RX ADMIN — SENNOSIDES AND DOCUSATE SODIUM 2 TABLET: 8.6; 5 TABLET ORAL at 08:49

## 2024-02-08 RX ADMIN — ACETAMINOPHEN 650 MG: 325 TABLET ORAL at 12:15

## 2024-02-08 RX ADMIN — Medication 2 MG: at 14:15

## 2024-02-08 RX ADMIN — THERA TABS 1 TABLET: TAB at 08:50

## 2024-02-08 RX ADMIN — GLIPIZIDE 10 MG: 10 TABLET ORAL at 08:50

## 2024-02-08 RX ADMIN — PREDNISONE 1 MG: 1 TABLET ORAL at 08:50

## 2024-02-08 RX ADMIN — OXYCODONE HYDROCHLORIDE AND ACETAMINOPHEN 1 TABLET: 5; 325 TABLET ORAL at 10:25

## 2024-02-08 RX ADMIN — HYDROCORTISONE: 1 CREAM TOPICAL at 08:52

## 2024-02-08 RX ADMIN — PANTOPRAZOLE SODIUM 40 MG: 40 TABLET, DELAYED RELEASE ORAL at 08:50

## 2024-02-08 RX ADMIN — HYDRALAZINE HYDROCHLORIDE 10 MG: 20 INJECTION INTRAMUSCULAR; INTRAVENOUS at 04:16

## 2024-02-08 ASSESSMENT — ACTIVITIES OF DAILY LIVING (ADL)
ADLS_ACUITY_SCORE: 52

## 2024-02-08 NOTE — DISCHARGE SUMMARY
"[facility]  Hospitalist Discharge Summary      Date of Admission:  1/28/2024  Date of Discharge:  2/8/2024  Discharging Provider: Kristian Stearns MD  Discharge Service: Hospitalist Service    Discharge Diagnoses   Covid19  Acute hypoxic respiratory failure  Diabetes  Left heel and sacral pressure ulcers  Oral candidiasis  Urinary retention      Clinically Significant Risk Factors     # DMII: A1C = 8.5 % (Ref range: <5.7 %) within past 6 months    # Obesity: Estimated body mass index is 35.46 kg/m  as calculated from the following:    Height as of 1/24/24: 1.626 m (5' 4\").    Weight as of this encounter: 93.7 kg (206 lb 9.1 oz).       Follow-ups Needed After Discharge   Follow-up Appointments     Follow Up and recommended labs and tests      Follow up with Nursing home physician.  - repeat CBC  by 2/22/24 to assess anemia, further workup as indicated  - ensure wound followup for left heel and sacral wounds  - jardiance discontinue on transition to TCU (discussed with patient's   son), please evaluate to resume after discharge home from TCU    Follow up with urology as scheduled for voiding trial.        Follow-up and recommended labs and tests       Follow up with [facility] in 1-2 weeks.            Unresulted Labs Ordered in the Past 30 Days of this Admission       No orders found from 12/29/2023 to 1/29/2024.            Discharge Disposition   Discharged to nursing home  Condition at discharge: Stable    Hospital Course   The patient was admitted to the hospital with covid19 and associated hypoxic respiratory failure.  She was treated with remdesivir and steroids.  Her course was complicated by urinary retention and the patient had a nolasco catheter in place.  She had heel and sacral wounds evaluated.  She was also treated with a course of nystatin for oral candidiasis.  She was discharged to TCU.    Consultations This Hospital Stay   PHYSICAL THERAPY ADULT IP " CONSULT  OCCUPATIONAL THERAPY ADULT IP CONSULT  WOUND OSTOMY CONTINENCE NURSE  IP CONSULT  CARE MANAGEMENT / SOCIAL WORK IP CONSULT  WOUND OSTOMY CONTINENCE NURSE  IP CONSULT  UROLOGY IP CONSULT  WOUND OSTOMY CONTINENCE NURSE  IP CONSULT  INFECTION PREVENTION IP CONSULT  PHYSICAL THERAPY ADULT IP CONSULT  OCCUPATIONAL THERAPY ADULT IP CONSULT  UROLOGY IP CONSULT    Code Status   Prior    Time Spent on this Encounter   I, Kristian Stearns MD, personally saw the patient today and spent less than or equal to 30 minutes discharging this patient.       Kristian Stearns MD  [address] [address]  Phone: [phone]  Fax: [phone]  ______________________________________________________________________    Physical Exam   Vital Signs: Temp: 97.9  F (36.6  C) Temp src: Oral BP: (!) 154/77 Pulse: 87   Resp: 16 SpO2: 95 % O2 Device: None (Room air)    Weight: 206 lbs 9.14 oz    See progress note       Primary Care Physician   Ananya Mclean    Discharge Orders      Follow-up and recommended labs and tests     Follow up with [facility] in 1-2 weeks.     Activity    Your activity upon discharge: Having a nolasco catheter does not limit your activity, however, it is recommended you do not drive with a nolasco catheter in place.     Tubes and drains    You are going home with the following tubes or drains: Nolasco catheter    CATHETER CARE   You are being discharged with a nolasco catheter. You may choose to alternate between a leg (day) bag and large (night) bag. Drain urine from the bag when it is about 2/3rds full. Use plain soap and water to wash urethral/groin area daily. Males - you may apply a small amount of Bacitracin or Neosporin ointment to the tip of the penis three times a day for comfort (decreases friction).     ACTIVITY   Having a nolasco catheter does not limit your activities, however, it is recommended you do not drive  with a nolasco catheter in place.     Seek medial evaluation if:  - You are feeling chilled or feverish, temperature >100.5.    - You develop thick cherry colored urine, clots or nolasco catheter is not draining well.   - You develop purulent drainage from the urethra.     FOLLOW UP:   [facility] will call you to arrange for your catheter removed in our office, typically 1-2 weeks after discharge from the hospital. Alternatively, this may be completed at a transitional care unit if a bladder scanner is available.     General info for SNF    Length of Stay Estimate: Short Term Care: Estimated # of Days <30  Condition at Discharge: Improving  Level of care:skilled   Rehabilitation Potential: Excellent  Admission H&P remains valid and up-to-date: Yes  Recent Chemotherapy: N/A  Use Nursing Home Standing Orders: Yes     Mantoux instructions    Give two-step Mantoux (PPD) Per Facility Policy Yes     Glucose monitor nursing POCT    Before meals and at bedtime     Activity - Up with nursing assistance     Additional Discharge Instructions    Left heel, pressure ulcers wound care  Nolasco catheter cares     Follow Up and recommended labs and tests    Follow up with Nursing home physician.  - repeat CBC  by 2/22/24 to assess anemia, further workup as indicated  - ensure wound followup for left heel and sacral wounds  - jardiance discontinue on transition to TCU (discussed with patient's son), please evaluate to resume after discharge home from TCU    Follow up with urology as scheduled for voiding trial.     Wound care    L heel  1. Paint with betadine, allow to dry  2. Offload heel at all times in bed, pillow under each calf  3. Do not cover with mepilex     Sacrum  1. Flush wound with NS and pat dry  2. Apply medihoney gel to wound base  3. Cover with mepilex  4. Change MWF + PRN if soiled, saturated, falls off     Reason for your hospital stay    The patient was admitted to the hospital with covid19 and associated hypoxic  respiratory failure.  She was treated with remdesivir and steroids.  Her course was complicated by urinary retention and the patient had a nolasco catheter in place.  She had heel and sacral wounds evaluated.  She was also treated with a course of nystatin for oral candidiasis.  She was discharged to TCU.     Physical Therapy Adult Consult    Evaluate and treat as clinically indicated.    Reason:  weakness     Occupational Therapy Adult Consult    Evaluate and treat as clinically indicated.    Reason:  weakness     Fall precautions     Diet    Follow this diet upon discharge: Orders Placed This Encounter      Snacks/Supplements Adult: Glucerna; With Meals      Regular Diet Adult       Significant Results and Procedures   Most Recent 3 CBC's:  Recent Labs   Lab Test 02/05/24  0542 02/02/24  0627 02/01/24  0653 01/31/24  0557   WBC  --  6.5 7.9 6.3   HGB 8.6* 8.5* 9.2* 8.3*   MCV  --  76* 77* 76*   PLT  --  362 329 284     Most Recent 3 BMP's:  Recent Labs   Lab Test 02/08/24  1155 02/08/24  1022 02/08/24  0939 02/06/24  0812 02/06/24  0622 02/03/24  1222 02/03/24  0910 02/02/24  0833 02/02/24  0627 02/01/24  0832 02/01/24  0653 01/31/24  0638 01/31/24  0557   NA  --   --   --   --   --   --   --   --  139  --  139  --  137   POTASSIUM  --   --   --   --   --   --   --   --  4.2  --  4.3  --  4.5   CHLORIDE  --   --   --   --   --   --   --   --  100  --  99  --  100   CO2  --   --   --   --   --   --   --   --  30*  --  31*  --  28   BUN  --   --   --   --   --   --   --   --  23.1*  --  27.4*  --  29.9*   CR  --   --   --   --  0.81  --  0.90  --  0.90  --  0.90  --  0.92   ANIONGAP  --   --   --   --   --   --   --   --  9  --  9  --  9   MACY  --   --   --   --   --   --   --   --  9.5  --  9.9  --  9.5   * 111* 57*   < >  --    < >  --    < > 68*   < > 105*   < > 292*    < > = values in this interval not displayed.   ,   Results for orders placed or performed during the hospital encounter of 01/28/24   Head  CT w/o contrast    Narrative    EXAM: CT HEAD W/O CONTRAST  LOCATION: [facility]  DATE: 1/28/2024    INDICATION: Altered mental status  COMPARISON: Head MRI 07/20/2021  TECHNIQUE: Routine CT Head without IV contrast. Multiplanar reformats. Dose reduction techniques were used.    FINDINGS:  Small area of nodular hyperattenuation along the right anterior falx measuring roughly 9 x 11 x 9 mm, similar in size compared to the prior MRI. No significant mass effect.    Background of mild generalized volume loss is present with nonspecific white matter hypoattenuation presumably representing chronic small vessel ischemic change. No acute osseous abnormality.      Impression    IMPRESSION:  1.  No acute intracranial abnormality.  2.  Small presumed meningioma along the anterior falx, similar in appearance compared to the prior MRI.   CT Chest Pulmonary Embolism w Contrast    Narrative    EXAM: CT CHEST PULMONARY EMBOLISM W CONTRAST  LOCATION: [facility]  DATE: 1/28/2024    INDICATION: dyspnea hypoxemia covid positive  COMPARISON: None.  TECHNIQUE: CT chest pulmonary angiogram during arterial phase injection of IV contrast. Multiplanar reformats and MIP reconstructions were performed. Dose reduction techniques were used.   CONTRAST: ISOVUE 370 75 mL    FINDINGS:  ANGIOGRAM CHEST: Pulmonary arteries are normal caliber and negative for pulmonary emboli. Thoracic aorta is negative for dissection. No CT evidence of right heart strain.    LUNGS AND PLEURA: Trace right effusion with bibasilar atelectasis. Small patchy airspace opacity within the right upper lobe. Additional smaller airspace opacities are noted bilaterally.  Bibasilar atelectasis.    MEDIASTINUM/AXILLAE: Heart is normal in size. No mediastinal, axillary, or hilar adenopathy    CORONARY ARTERY CALCIFICATION: Mild.    UPPER ABDOMEN: Normal.    MUSCULOSKELETAL: Degenerative changes of the spine.      Impression     IMPRESSION:  1.  No pulmonary embolism.  2.  Small patchy airspace opacity within the right upper lobe with few additional smaller airspace opacities bilaterally which could be related to pneumonia or COVID.   XR Abdomen Port 1 View    Narrative    EXAM: XR ABDOMEN PORT 1 VIEW  LOCATION: [facility]  DATE: 2/1/2024    INDICATION: Constipated.  COMPARISON: None.      Impression    IMPRESSION: Small amount of stool. Nonobstructive bowel gas pattern.       Discharge Medications   Current Discharge Medication List        START taking these medications    Details   hydrocortisone (CORTAID) 1 % external cream Apply topically 2 times daily as needed (hemorrhoid irritation) To hemorrhoids.    Associated Diagnoses: External hemorrhoids      !! miconazole (MICATIN) 2 % external powder Apply topically 2 times daily as needed for other (candidiasis/intertrigo) To skin folds    Associated Diagnoses: Groin rash      polyethylene glycol (MIRALAX) 17 GM/Dose powder Take 17 g by mouth daily    Associated Diagnoses: Chronic pain syndrome       !! - Potential duplicate medications found. Please discuss with provider.        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 glipiZIDE (GLUCOTROL) 10 MG tablet Take 10 mg by mouth daily (with breakfast)      !! glipiZIDE (GLUCOTROL) 5 MG tablet Take 5 mg by mouth daily  (with dinner)      hydrOXYzine (ATARAX) 25 MG tablet Take 25 mg by mouth 3 times daily as needed for anxiety      lamoTRIgine (LAMICTAL) 100 MG tablet Take 100 mg by mouth 2 times daily      melatonin 3 MG tablet Take 1 tablet (3 mg) by mouth nightly as needed for sleep      metFORMIN (GLUCOPHAGE XR) 500 MG 24 hr tablet Take 500 mg by mouth daily (with breakfast)      !! miconazole (MICATIN) 2 % external powder Apply topically 2 times daily as needed To the skin fold      omeprazole (PRILOSEC) 20 MG DR capsule Take 20 mg by mouth daily      !! predniSONE (DELTASONE) 1 MG tablet Take 1 mg by mouth every morning (In addition to the 5 mg dose; 1 mg + 5 mg = 6 mg)      !! predniSONE (DELTASONE) 5 MG tablet Take 5 mg by mouth every morning (In addition to the 1 mg dose; 5 mg + 1 mg = 6 mg)      pregabalin (LYRICA) 100 MG capsule Take 100 mg by mouth 2 times daily      triamterene-HCTZ (DYAZIDE) 37.5-25 MG capsule Take 1 capsule by mouth every morning Hold for SBP<110      blood glucose (ACCU-CHEK FASTCLIX) lancing device FOR TESTING ONCE DAILY. DX  E11.9 TYPE 2 DIABETES      !! blood glucose (CONTOUR TEST) test strip TESTING EVERY DAY DX  E11.9      !! CONTOUR NEXT TEST test strip TESTING EVERY DAY DX  E11.9       !! - Potential duplicate medications found. Please discuss with provider.        STOP taking these medications       JARDIANCE 25 MG TABS tablet Comments:   Reason for Stopping:         ondansetron (ZOFRAN ODT) 4 MG ODT tab Comments:   Reason for Stopping:         solifenacin (VESICARE) 5 MG tablet Comments:   Reason for Stopping:             Allergies   Allergies   Allergen Reactions    Propofol Nausea and Vomiting    Shellfish Allergy      Other reaction(s): Dizziness    Capsaicin Rash and Blisters    Tegaderm Transparent Dressing (Informational Only) Itching and Rash

## 2024-02-08 NOTE — PROGRESS NOTES
"[facility]    Medicine Progress Note - Hospitalist Service    Date of Admission:  1/28/2024    Assessment & Plan   Jumana Yang is a 83 year old female admitted with covid19.  Course complicated by urinary retention.  Nolasco catheter in place, plan for outpatient urology followup.  She is medically ready for discharge when appropriate disposition arranged.      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    # Neuropathy  - resume duloxetine  - pregabalin          Diet: Regular Diet Adult  Diet  Snacks/Supplements Adult: Glucerna; With Meals    Nolasco Catheter: PRESENT, indication: Retention  Lines: None     Cardiac Monitoring: None  Code Status: No CPR- Do NOT Intubate      Clinically Significant Risk Factors              # Hypoalbuminemia: Lowest albumin = 3.4 g/dL at 1/28/2024  1:29 PM, will monitor as appropriate     # Hypertension: Noted on problem list       # DMII: A1C = 8.5 % (Ref range: <5.7 %) within past 6 months   # Obesity: Estimated body mass index is 35.46 kg/m  as calculated from the following:    Height as of 1/24/24: 1.626 m (5' 4\").    Weight as of this encounter: 93.7 kg (206 lb 9.1 oz).      # Financial/Environmental Concerns: none         Disposition Plan      Expected Discharge Date: 02/08/2024    Discharge Delays: *Medically Ready for Discharge  Destination: home with help/services  Discharge Comments: TCU referrals pending and additional referrals placed 02/02.  Home with home care, family to transport            Kristian Stearns MD  Hospitalist Service  [facility]  Securely message with Young (more " info)  Text page via [employer] Paging/Directory   ______________________________________________________________________    Interval History   Patient is very anxious about her transition to TCU.  Reports neuropathic pain in her legs.    Son is ok to stop jardiance while patient is at TCU.    Physical Exam   Vital Signs: Temp: 97.9  F (36.6  C) Temp src: Oral BP: (!) 154/77 Pulse: 87   Resp: 16 SpO2: 95 % O2 Device: None (Room air)    Weight: 206 lbs 9.14 oz    Gen:  lying in bed, anxious and labile  ENT:  no plaques visualized in mouth  Neuro: alert, conversant; unable to hear, requires written communication  CV:  borderline tachycardia, regular rhythm  Pulm: no acute resp distress, ctab anteriorly  Skin:  left heel ulcer with black crusted base appx half dollar in size; linear 2cm shallow tear/wound in the sacral region just superior to the anus    Medical Decision Making             Data

## 2024-02-08 NOTE — PLAN OF CARE
Problem: Anxiety  Goal: Anxiety Reduction or Resolution  Outcome: Not Progressing     Problem: Adult Inpatient Plan of Care  Goal: Readiness for Transition of Care  Outcome: Progressing     A&Ox4. Reports chronic pain, managed with PRN percocet and tylenol. Teran intact. L heel pressure sore BASIL. Mepilex on bottom. Pt wants to discuss discharge plans with CM and son Davie tomorrow. Alarms on for safety.

## 2024-02-08 NOTE — PROGRESS NOTES
Care Management Follow Up    Length of Stay (days): 11    Expected Discharge Date: 02/08/2024     Concerns to be Addressed: discharge planning  Coordination with current home care providers vs. TCU placement needed at discharge  Patient plan of care discussed at interdisciplinary rounds: Yes    Anticipated Discharge Disposition: Transitional Care     Anticipated Discharge Services:  (TBD)  Anticipated Discharge DME: None    Patient/family educated on Medicare website which has current facility and service quality ratings: no  Education Provided on the Discharge Plan: Yes (AVS per bedside RN)  Patient/Family in Agreement with the Plan: yes (Davie (son))    Referrals Placed by CM/SW: Post Acute Facilities  Private pay costs discussed: Not applicable    Additional Information:    8:29 AM  Jonas Broderick - Received call from jonas Broderick.  He is hopeful Urology can remove pt's nolasco catheter and hopes he can take pt home with home care rather than to TCU.  Additional TCU referrals sent per Davie's request.  He is aware pt has been accepted to [facility] and plans to visit [facility] today.      9:37 AM  [facility] [place] states they can potentially accept this pt, IF Jardiance can be held while at TCU.    11:32 AM  Jonas Broderick - Reports he wants TCU at discharge.  States he will not be able to safely care for pt with currently homecare arrangements (does not have 24 hour assistance for patient).  He would prefer [facility] TCU.    Update given to Dr. Stearns.    Update given to Jaimie at [facility].    12:28 PM  PAS done today, by colleague RAJ CM: [id number]     2:09 PM  [facility]-1550pm.    Update given to jonas Broderick, bedside RN, Charge RN, [facility].  Update given to [place] at [facility].    Notified Jaimie at [facility] to close out referral.    Ji Arango RN

## 2024-02-08 NOTE — PLAN OF CARE
Problem: Risk for Delirium  Goal: Improved Behavioral Control  Intervention: Prevent and Manage Agitation  Recent Flowsheet Documentation  Taken 2/8/2024 0008 by Jahaira Martin RN  Environment Familiarity/Consistency: daily routine followed     Problem: Risk for Delirium  Goal: Improved Sleep  Intervention: Promote Sleep  Recent Flowsheet Documentation  Taken 2/8/2024 0008 by Jahaira Martin, RN  Sleep/Rest Enhancement:   regular sleep/rest pattern promoted   room darkened   Goal Outcome Evaluation:      Patient alert and oriented, speech clear.  Patient as night progress became more anxious thinking about going home or TCU.  She verbalized that in past her experiences at TCU weren't the best.  That she was grateful her son would check out place before deciding to go there.  She was feeling anxious as to would love to go home with home care but past caregivers were unable to help.  Patient blood pressure kerri 180-200/.  Given Hydralazine prn.  Patient had perecot at 0008 for chronic back pain, Valium 2 mg at 0008.  She slept very little.  Given Atarax 25 mg prn for anxiety.   At 2330 patient declined repositioning til 0330.  Patient calmed down while talking.  Concerns regarding catheter and urology consult.  Used white board to communicate with staff plus some verbalization.  Last BM 2/7/24.  Indwelling catheter patent straw colored urine.

## 2024-02-08 NOTE — PROGRESS NOTES
Occupational Therapy Discharge Summary    Reason for therapy discharge:    Discharged to transitional care facility.    Progress towards therapy goal(s). See goals on Care Plan in [facility] electronic health record for goal details.  Goals partially met.  Barriers to achieving goals:   discharge from facility.    Therapy recommendation(s):    Continued therapy is recommended.  Rationale/Recommendations:  Increase strength/cog for ADL.

## 2024-02-08 NOTE — PROGRESS NOTES
Remote Note    Was consulted for retention. Pt previously seen by [facility] NP but has follow-up with [facility] next week. She had >800 ml in bladder. Nursing could not place nolasco and urology NP placed it.    I believe question was if could do voiding trial at son's request. Would be ideal to do in clinic with urology team once she has been discharged from hospital and has had more time to recover.    Jean Alvarado MD

## 2024-02-08 NOTE — PROGRESS NOTES
Physical Therapy Discharge Summary    Reason for therapy discharge:    Discharged to transitional care facility.    Progress towards therapy goal(s). See goals on Care Plan in [facility] electronic health record for goal details.  Goals not met.  Barriers to achieving goals:   discharge from facility.    Therapy recommendation(s):    Continued therapy is recommended.  Rationale/Recommendations:  TCU.

## 2024-02-08 NOTE — PROGRESS NOTES
"Nurse came into room to assist nursing assistant with repositioning. The patient stating very angrily that \"no one had been here for hours and I have been calling for hours and no one has come.\" Even though staff have been in the room multiple times an hour.     This was the first encounter with this patient so the nurse asked the patient how she could help and assist the patient. When the patient requested water, water was provided. Patient then stated she was very anxious, nurse reported to the primary nurse about increasing anxiety.     The writer also educated the patient that staff would be in the room at least every 2 hours to offer repositioning, the patient stated it had been two hours, so the nurse explained that that is why staff is present now, and we are here to help at this time. Patient became very agitated and verbally abusive to staff at this answer threatening to report staff tomorrow. Staff attempting to educate the patient once more on how staff is here to help the patient with any of their needs, patient stated she did not want to talk to staff anymore on the matter while raising a hand towards the staff. The writer and nursing assistant both present at this encounter.   "

## 2024-02-08 NOTE — PROGRESS NOTES
Care Management Discharge Note    Discharge Date: 02/08/2024       Discharge Disposition: Transitional Care    Discharge Services: Transportation Services    Discharge DME: None    Discharge Transportation: agency    Private pay costs discussed: transportation costs, pt has [employer]+ insurance    Does the patient's insurance plan have a 3 day qualifying hospital stay waiver?  No    PAS Confirmation Code: [id number] ([id number])  Patient/family educated on Medicare website which has current facility and service quality ratings: no    Education Provided on the Discharge Plan: Yes    Persons Notified of Discharge Plans: patient, son Davie    Patient/Family in Agreement with the Plan: yes (Davie (son))    Handoff Referral Completed: Yes    Additional Information:    Pt discharging to TCU today.    [facility] stretcher secured for today 2/8 to [facility], pickup window 1515-1550pm.     Update given to yan Broderick, bedside RN, Charge RN, ANGELIQUE.  Update given to [place] at [facility].    PAS done, PCS done.    3:06 PM  ANGELIQUE confirms she faxed the hard script for oxycodone to fax number requested by the TCU (fax [phone]).  Called and left voicemail with [facility] (TCU welcome to contact CM again if they have not received hard script).    PAS number provided to [facility] via FRESS.    3:11 PM  [facility] - Pt had skilled homecare services via this agency prior to admit.  Notified Kaylie  of pt's discharge to TCU today.    [facility] - Pt had PCA services via this agency prior to admit.  Notified Keely of pt's discharge to TCU today.    Ji Arango RN

## 2024-02-09 ENCOUNTER — PATIENT OUTREACH (OUTPATIENT)
Dept: CARE COORDINATION | Facility: CLINIC | Age: 84
End: 2024-02-09
Payer: MEDICARE

## 2024-02-09 NOTE — PROGRESS NOTES
Connected Care Resource Center: Connected Care Resource [place]    Background: Transitional Care Management program identified per system criteria and reviewed by [facility] team for possible outreach.    Assessment: Upon chart review, CCRC Team member will not proceed with patient outreach related to this episode of Transitional Care Management program due to reason below:    Non-[facility] TCU: CCRC team member noted patient discharged to TCU/ARU/LTACH. Patient is not established with a [facility] currently supported by Primary Care-Care Coordination therefore handoff to Primary Care-Care Coordination is not appropriate at this time.    Plan: Transitional Care Management episode addressed appropriately per reason noted above.      Melissa Fernandez  Community Health Worker  [facility], [facility]  [phone]      *Connected Care Resource Team does NOT follow patient ongoing. Referrals are identified based on internal discharge reports and the outreach is to ensure patient has an understanding of their discharge instructions.

## 2024-02-14 ENCOUNTER — TELEPHONE (OUTPATIENT)
Dept: UROLOGY | Facility: CLINIC | Age: 84
End: 2024-02-14

## 2024-02-14 ENCOUNTER — OFFICE VISIT (OUTPATIENT)
Dept: UROLOGY | Facility: CLINIC | Age: 84
End: 2024-02-14
Attending: OBSTETRICS & GYNECOLOGY
Payer: MEDICARE

## 2024-02-14 VITALS
DIASTOLIC BLOOD PRESSURE: 73 MMHG | WEIGHT: 206 LBS | SYSTOLIC BLOOD PRESSURE: 131 MMHG | BODY MASS INDEX: 35.36 KG/M2 | HEART RATE: 98 BPM

## 2024-02-14 DIAGNOSIS — N95.2 VAGINAL ATROPHY: Primary | ICD-10-CM

## 2024-02-14 DIAGNOSIS — N39.46 MIXED INCONTINENCE: ICD-10-CM

## 2024-02-14 DIAGNOSIS — R33.9 URINARY RETENTION: ICD-10-CM

## 2024-02-14 PROCEDURE — 99417 PROLNG OP E/M EACH 15 MIN: CPT | Performed by: OBSTETRICS & GYNECOLOGY

## 2024-02-14 PROCEDURE — 99215 OFFICE O/P EST HI 40 MIN: CPT | Performed by: OBSTETRICS & GYNECOLOGY

## 2024-02-14 PROCEDURE — G0463 HOSPITAL OUTPT CLINIC VISIT: HCPCS | Performed by: OBSTETRICS & GYNECOLOGY

## 2024-02-14 RX ORDER — ESTRADIOL 0.1 MG/G
1 CREAM VAGINAL
Qty: 42.5 G | Refills: 3 | Status: SHIPPED | OUTPATIENT
Start: 2024-02-14

## 2024-02-14 RX ORDER — ACETAMINOPHEN 325 MG/1
325 TABLET ORAL EVERY 6 HOURS PRN
COMMUNITY
Start: 2024-02-09

## 2024-02-14 NOTE — PATIENT INSTRUCTIONS
Thank you for trusting us with your care!     If you need to contact us for questions about:  Symptoms, Scheduling & Medical Questions; Non-urgent (2-3 day response) Anirudh message, Urgent (needing response today) [phone] (if after 3:30pm next day response)   Prescriptions: Please call your Pharmacy   Billing: Bailee [phone] or [facility]:[phone]

## 2024-02-14 NOTE — Clinical Note
Hi team, this patient is prior Dr alonzo patient now mine who is currently in a transitional home after hospitalization for covid. She has a nolasco in for urinary retention and currently not yet strong enough to ambulate to the bathroom easily.  Nolasco has been in for about 2 weeks ( traumatic experience during placement). We decided to leave it in and for her to come back when she is stronger and closer to being discharged home so we can remove her catheter and do a voiding trial to make sure she can void on her own.  I gave her and her son our number to call when she is ready to schedule a nurse appointment for a voiding trial. Please assist her when they call. If I am available, I am happy to be involved but it doesn't require me to be there to do this.  Thanks!

## 2024-02-14 NOTE — TELEPHONE ENCOUNTER
[facility] Call Center    Phone Message    May a detailed message be left on voicemail: no     Reason for Call: Other: Monique from [employer] [facility] called. Monique said they did not receive the after summery visit notes. Pt didn't had on her as well. Please send or fax it the after summary visit notes to   [phone]. It should say Atten to [facility]. Thank you.    Action Taken: [facility]    Travel Screening: Not Applicable

## 2024-02-14 NOTE — LETTER
2024       RE: Jumana Yang  [address]  [place] [address]     Dear Colleague,    Thank you for referring your patient, Jumana Yang, to the [facility] [place] at [facility]. Please see a copy of my visit note below.    2024    Referring Provider: Referred Self, MD  No address on file    Primary Care Provider: Ananya Mclean    CC: urinary incontinence    HPI:  Jumana Yang is a 83 year old  with many medical comorbidities including hx of spinal stenosis ( on wheel chair), bipolar disorder, COPD, DM ( last HgA1c on 24 was 8.5), cHTN, IBS, Chronic pain and steroid meds who presents for evaluation of urgency urinary incontinence. She had seen Dr Foreman previously who had advised on stopping her oxybutin and start vesicare. She says she doesn't remember this plan and unlikely that she is taking vesicare . Two weeks ago, she ended up in the hospital for COVID infection and was sent to transitional recovery facility with a nolasco catheter for what sounds like urinary retention. She says she had a really bad experience with the nolasco placement and is very anxious about it being removed. Also she is still in transitional home having some PT and is not yet stable enough to reliably ambulate to the bathroom frequently.       Urinary Symptoms/Voiding function  Without the catheter, she normally leaks large volumes with urgency and sometimes without and sensory warning. Does not report leakage with typical stress triggers but sounds like she leaks with movement ( getting from sitting to standing) etc.     Pelvic Organ Prolapse Symptoms  Denies vaginal bulge, pressure sensation or protrusion.    Gastrointestinal Symptoms:  Has IBS and alternates between constipation and diarrhea. Chronic pain meds likely contributing.  A bit better recently with modification of her meds.     Sexual function/Pelvic  floor pain/GYN:   Does not report pelvic pain today. No vaginal bleeding      Relevant Medical History:    Diabetes? yes  High Blood pressure? yes     Recurrent UTIs? no  Sleep Apnea? No  Obesity? Body mass index is 35.36 kg/m .  History of Blood clots? no  Other medical problems:  As above    Surgical History:      Past Surgical History:   Procedure Laterality Date    CATARACT IOL, RT/LT Left 7/2013    FOOT SURGERY      toe    GI SURGERY      IR LUMBAR/SACRAL TRANSFOR INJ BILATERAL Bilateral 3/11/2022    LAPAROSCOPIC HEPATECTOMY PARTIAL  11/4/2013    Procedure: LAPAROSCOPIC HEPATECTOMY PARTIAL;  Laparoscopic Debridement of Liver Abcess;  Surgeon: Sepideh Green MD;  Location: [facility] OR    ORTHOPEDIC SURGERY      PICC INSERTION  8/28/2013    5fr DL Power PICC, 41cm (1cm external length) in the R lateral brachial vein with tip in the SVC RA junction.    RECTAL SURGERY      1970s    RELEASE CARPAL TUNNEL Left 3/21/2023    Procedure: LEFT OPEN CARPAL TUNNEL RELEASE. LEFT RING FINGER A1 PULLEY RELEASE;  Surgeon: Claire Hamilton MD;  Location: [facility] OR    VASCULAR SURGERY         OB/Gyn History:  OB History   No obstetric history on file.       Medications/Vitamins/Supplements:   Current Outpatient Medications   Medication    acetaminophen (TYLENOL) 325 MG tablet    busPIRone (BUSPAR) 7.5 MG tablet    diazepam (VALIUM) 2 MG tablet    DULoxetine (CYMBALTA) 60 MG capsule    glipiZIDE (GLUCOTROL) 10 MG tablet    glipiZIDE (GLUCOTROL) 5 MG tablet    hydrocortisone (CORTAID) 1 % external cream    hydrOXYzine (ATARAX) 25 MG tablet    lamoTRIgine (LAMICTAL) 100 MG tablet    melatonin 3 MG tablet    metFORMIN (GLUCOPHAGE XR) 500 MG 24 hr tablet    miconazole (MICATIN) 2 % external powder    miconazole (MICATIN) 2 % external powder    omeprazole (PRILOSEC) 20 MG DR capsule    polyethylene glycol (MIRALAX) 17 GM/Dose powder    predniSONE (DELTASONE) 1 MG tablet    predniSONE (DELTASONE) 5 MG tablet    pregabalin  (LYRICA) 100 MG capsule    triamterene-HCTZ (DYAZIDE) 37.5-25 MG capsule    blood glucose (ACCU-CHEK FASTCLIX) lancing device    blood glucose (CONTOUR TEST) test strip    CONTOUR NEXT TEST test strip    oxyCODONE-acetaminophen (PERCOCET) 5-325 MG tablet     No current facility-administered medications for this visit.         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    Hepatic abscess     Hyperlipidemia     Hypertension     Hypothyroidism     Irritable bowel syndrome     Meniere disease     Neuropathy     Noninfectious ileitis     Peritoneal abscess (H)     Spinal stenosis of lumbar region     Steroid long-term use     Vaginitis, atrophic, postmenopausal     Vitamin D deficiency      ROS  Social History    Social History     Socioeconomic History    Marital status: Single     Spouse name: Not on file    Number of children: Not on file    Years of education: Not on file    Highest education level: Not on file   Occupational History    Not on file   Tobacco Use    Smoking status: Former     Types: Cigarettes     Quit date: 11/15/1987     Years since quittin.2    Smokeless tobacco: Former   Substance and Sexual Activity    Alcohol use: Not Currently     Alcohol/week: 0.0 standard drinks of alcohol     Comment: MALISSA white since     Drug use: No     Comment: used marijuanna in the past    Sexual activity: Never     Partners: Male   Other Topics Concern    Parent/sibling w/ CABG, MI or angioplasty before 65F 55M? No   Social History Narrative    ** Merged History Encounter **          Social Determinants of Health      Financial Resource Strain: Not on file   Food Insecurity: Not on file   Transportation Needs: Not on file   Physical Activity: Not on file   Stress: Not on file   Social Connections: Not on file   Interpersonal Safety: Not on file   Housing Stability: Not on file       Family History  Family History   Problem Relation Age of Onset    Cerebrovascular Disease Mother     Heart Disease Mother     Heart Disease Father     Heart Disease Brother     Diabetes No family hx of     Coronary Artery Disease No family hx of     Hypertension No family hx of     Hyperlipidemia No family hx of     Breast Cancer No family hx of     Colon Cancer No family hx of     Prostate Cancer No family hx of     Other Cancer No family hx of     Depression No family hx of     Anxiety Disorder No family hx of     Mental Illness No family hx of     Substance Abuse No family hx of     Anesthesia Reaction No family hx of     Asthma No family hx of     Osteoporosis No family hx of     Genetic Disorder No family hx of     Thyroid Disease No family hx of     Obesity No family hx of     Unknown/Adopted No family hx of        Allergy    Allergies   Allergen Reactions    Propofol Nausea and Vomiting    Shellfish Allergy      Other reaction(s): Dizziness    Capsaicin Rash and Blisters    Capsaicin Blisters and Rash    Tegaderm Transparent Dressing (Informational Only) Itching and Rash       Current Outpatient Medications   Medication    acetaminophen (TYLENOL) 325 MG tablet    busPIRone (BUSPAR) 7.5 MG tablet    diazepam (VALIUM) 2 MG tablet    DULoxetine (CYMBALTA) 60 MG capsule    glipiZIDE (GLUCOTROL) 10 MG tablet    glipiZIDE (GLUCOTROL) 5 MG tablet    hydrocortisone (CORTAID) 1 % external cream    hydrOXYzine (ATARAX) 25 MG tablet    lamoTRIgine (LAMICTAL) 100 MG tablet    melatonin 3 MG tablet    metFORMIN (GLUCOPHAGE XR) 500 MG 24 hr tablet    miconazole (MICATIN) 2 % external powder    miconazole (MICATIN) 2 % external powder    omeprazole  (PRILOSEC) 20 MG DR capsule    polyethylene glycol (MIRALAX) 17 GM/Dose powder    predniSONE (DELTASONE) 1 MG tablet    predniSONE (DELTASONE) 5 MG tablet    pregabalin (LYRICA) 100 MG capsule    triamterene-HCTZ (DYAZIDE) 37.5-25 MG capsule    blood glucose (ACCU-CHEK FASTCLIX) lancing device    blood glucose (CONTOUR TEST) test strip    CONTOUR NEXT TEST test strip    oxyCODONE-acetaminophen (PERCOCET) 5-325 MG tablet     No current facility-administered medications for this visit.       Physical Exam:   /73   Pulse 98   Wt 93.4 kg (206 lb)   LMP  (LMP Unknown)   BMI 35.36 kg/m   No LMP recorded (lmp unknown). Patient is postmenopausal. Body mass index is 35.36 kg/m .    Gen:  is alert, comfortable in no acute distress,   Abdomen: Abdomen is soft, non-tender, non-distended,   Lungs: non-labored breathing.       Pelvic Exam:   Normal external female genitalia. The urethra was Normal. Nolasco in . Concentrated urine in bag , draining well   Vagina: severe atrophy, normal support, no myalgia  Uterus: Normal size, non tender   Ovaries: No palpable mass , limited exam due to habitus  Vulva: No lesions, no pain   Rectal: Deferred   Pelvic floor muscles: No myalgia    POPQ EXAM FOR PROLAPSE SEVERITY  No prolapse    Voiding trial:  Deferred: nolasco in place    Labs:   Color Urine (no units)   Date Value   02/05/2024 Yellow   04/12/2016 Yellow     Appearance Urine (no units)   Date Value   02/05/2024 Turbid (A)   04/12/2016 Clear     Glucose Urine (mg/dL)   Date Value   02/05/2024 >1000 (A)   04/12/2016 Negative     Bilirubin Urine (no units)   Date Value   02/05/2024 Negative   04/12/2016 Negative     Ketones Urine (mg/dL)   Date Value   02/05/2024 Negative   04/12/2016 10 (A)     Specific Gravity Urine (no units)   Date Value   02/05/2024 1.015   04/12/2016 1.019     pH Urine   Date Value   02/05/2024 5.5   04/12/2016 5.5 pH     Protein Albumin Urine (mg/dL)   Date Value   02/05/2024 Negative   04/12/2016 Negative      Urobilinogen Urine (EU/dL)   Date Value   03/14/2016 0.2     Nitrite Urine (no units)   Date Value   02/05/2024 Negative   04/12/2016 Negative     Leukocyte Esterase Urine (no units)   Date Value   02/05/2024 75 Angy/uL (A)   04/12/2016 Small (A)     CBC RESULTS:   Recent Labs   Lab Test 02/05/24  0542 02/02/24  0627   WBC  --  6.5   RBC  --  4.27   HGB 8.6* 8.5*   HCT  --  32.3*   MCV  --  76*   MCH  --  19.9*   MCHC  --  26.3*   RDW  --  19.9*   PLT  --  362       A/P: Jumana Yang is a 83 year old F with     Tonya was seen today for consult.    Diagnoses and all orders for this visit:    Vaginal atrophy  -     estradiol (ESTRACE) 0.1 MG/GM vaginal cream; Place 1 g vaginally three times a week    Mixed incontinence  -     estradiol (ESTRACE) 0.1 MG/GM vaginal cream; Place 1 g vaginally three times a week      Urgency>> stress leakage  Multiple risk factors: obesity, poor mobility, atrophy, diabetes , age  Estrace cream for atroph prescribed  She is currently still not strong enough to leave her transition facility and is working with PT to be stronger to go to the bathroom etc. Recommended that they call and come back to get a voiding trial with us when she is physically stronger to see if she can go home without a catheter when it is time to leave the transition home in a few weeks. Phone number provided and message sent to our nursing team . Once her nolasco is out and we know her baseline bladder function, we can discuss management for her incontinence.      I spent a total of 60 minutes with  Jumana Yang ( and her son)  on the date of the encounter in chart review, face to face patient visit, review of tests, documentation and/or discussion with other providers about the issues documented above.     Rajwinder Clifton MD, [facility]  , Department of OBGYN  Female Pelvic Medicine and Reconstructive Surgery ( Urogynecology)  CC  Patient Care Team:  Ananya Mclean, NP as PCP - General (Nurse  Practitioner - Adult Health)  Herminio Goodman MBBS as Assigned Rheumatology Provider  Abbi Caro AuD as Audiologist (Audiology)  Ananya LOPEZ as   Ibeth Foster MD as Anesthesiologist (Pain Medicine)  Ibeth Foster MD as Anesthesiologist (Pain Medicine)  Ananya Mclean NP (Nurse Practitioner - Adult Health)  Caitlin Osullivan PA-C as Assigned Heart and Vascular Provider  [facility]  Davina Hatfield AuD as Audiologist (Audiology)  Jorge Foreman MD as MD (OB/Gyn)  Jorge Foreman MD as Assigned OBGYN Provider  SELF, REFERRED

## 2024-02-14 NOTE — PROGRESS NOTES
2024    Referring Provider: Referred Self, MD  No address on file    Primary Care Provider: Ananya Mclean    CC: urinary incontinence    HPI:  Jumana Yang is a 83 year old  with many medical comorbidities including hx of spinal stenosis ( on wheel chair), bipolar disorder, COPD, DM ( last HgA1c on 24 was 8.5), cHTN, IBS, Chronic pain and steroid meds who presents for evaluation of urgency urinary incontinence. She had seen Dr Foreman previously who had advised on stopping her oxybutin and start vesicare. She says she doesn't remember this plan and unlikely that she is taking vesicare . Two weeks ago, she ended up in the hospital for COVID infection and was sent to transitional recovery facility with a nolasco catheter for what sounds like urinary retention. She says she had a really bad experience with the nolasco placement and is very anxious about it being removed. Also she is still in transitional home having some PT and is not yet stable enough to reliably ambulate to the bathroom frequently.       Urinary Symptoms/Voiding function  Without the catheter, she normally leaks large volumes with urgency and sometimes without and sensory warning. Does not report leakage with typical stress triggers but sounds like she leaks with movement ( getting from sitting to standing) etc.     Pelvic Organ Prolapse Symptoms  Denies vaginal bulge, pressure sensation or protrusion.    Gastrointestinal Symptoms:  Has IBS and alternates between constipation and diarrhea. Chronic pain meds likely contributing.  A bit better recently with modification of her meds.     Sexual function/Pelvic floor pain/GYN:   Does not report pelvic pain today. No vaginal bleeding      Relevant Medical History:    Diabetes? yes  High Blood pressure? yes     Recurrent UTIs? no  Sleep Apnea? No  Obesity? Body mass index is 35.36 kg/m .  History of Blood clots? no  Other medical problems:  As above    Surgical History:       Past Surgical History:   Procedure Laterality Date    CATARACT IOL, RT/LT Left 7/2013    FOOT SURGERY      toe    GI SURGERY      IR LUMBAR/SACRAL TRANSFOR INJ BILATERAL Bilateral 3/11/2022    LAPAROSCOPIC HEPATECTOMY PARTIAL  11/4/2013    Procedure: LAPAROSCOPIC HEPATECTOMY PARTIAL;  Laparoscopic Debridement of Liver Abcess;  Surgeon: Sepideh Green MD;  Location: [facility] OR    ORTHOPEDIC SURGERY      PICC INSERTION  8/28/2013    5fr DL Power PICC, 41cm (1cm external length) in the R lateral brachial vein with tip in the SVC RA junction.    RECTAL SURGERY      1970s    RELEASE CARPAL TUNNEL Left 3/21/2023    Procedure: LEFT OPEN CARPAL TUNNEL RELEASE. LEFT RING FINGER A1 PULLEY RELEASE;  Surgeon: Claire Hamilton MD;  Location: [facility] OR    VASCULAR SURGERY         OB/Gyn History:  OB History   No obstetric history on file.       Medications/Vitamins/Supplements:   Current Outpatient Medications   Medication    acetaminophen (TYLENOL) 325 MG tablet    busPIRone (BUSPAR) 7.5 MG tablet    diazepam (VALIUM) 2 MG tablet    DULoxetine (CYMBALTA) 60 MG capsule    glipiZIDE (GLUCOTROL) 10 MG tablet    glipiZIDE (GLUCOTROL) 5 MG tablet    hydrocortisone (CORTAID) 1 % external cream    hydrOXYzine (ATARAX) 25 MG tablet    lamoTRIgine (LAMICTAL) 100 MG tablet    melatonin 3 MG tablet    metFORMIN (GLUCOPHAGE XR) 500 MG 24 hr tablet    miconazole (MICATIN) 2 % external powder    miconazole (MICATIN) 2 % external powder    omeprazole (PRILOSEC) 20 MG DR capsule    polyethylene glycol (MIRALAX) 17 GM/Dose powder    predniSONE (DELTASONE) 1 MG tablet    predniSONE (DELTASONE) 5 MG tablet    pregabalin (LYRICA) 100 MG capsule    triamterene-HCTZ (DYAZIDE) 37.5-25 MG capsule    blood glucose (ACCU-CHEK FASTCLIX) lancing device    blood glucose (CONTOUR TEST) test strip    CONTOUR NEXT TEST test strip    oxyCODONE-acetaminophen (PERCOCET) 5-325 MG tablet     No current facility-administered medications for this visit.          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    Hepatic abscess     Hyperlipidemia     Hypertension     Hypothyroidism     Irritable bowel syndrome     Meniere disease     Neuropathy     Noninfectious ileitis     Peritoneal abscess (H)     Spinal stenosis of lumbar region     Steroid long-term use     Vaginitis, atrophic, postmenopausal     Vitamin D deficiency      ROS  Social History    Social History     Socioeconomic History    Marital status: Single     Spouse name: Not on file    Number of children: Not on file    Years of education: Not on file    Highest education level: Not on file   Occupational History    Not on file   Tobacco Use    Smoking status: Former     Types: Cigarettes     Quit date: 11/15/1987     Years since quittin.2    Smokeless tobacco: Former   Substance and Sexual Activity    Alcohol use: Not Currently     Alcohol/week: 0.0 standard drinks of alcohol     Comment: MALISSA white since     Drug use: No     Comment: used marijuanna in the past    Sexual activity: Never     Partners: Male   Other Topics Concern    Parent/sibling w/ CABG, MI or angioplasty before 65F 55M? No   Social History Narrative    ** Merged History Encounter **          Social Determinants of Health     Financial Resource Strain: Not on file   Food Insecurity: Not on file   Transportation Needs: Not on file   Physical Activity: Not on file   Stress: Not on file   Social Connections: Not on file   Interpersonal Safety: Not on file   Housing Stability: Not on file       Family History  Family History   Problem Relation Age of  Onset    Cerebrovascular Disease Mother     Heart Disease Mother     Heart Disease Father     Heart Disease Brother     Diabetes No family hx of     Coronary Artery Disease No family hx of     Hypertension No family hx of     Hyperlipidemia No family hx of     Breast Cancer No family hx of     Colon Cancer No family hx of     Prostate Cancer No family hx of     Other Cancer No family hx of     Depression No family hx of     Anxiety Disorder No family hx of     Mental Illness No family hx of     Substance Abuse No family hx of     Anesthesia Reaction No family hx of     Asthma No family hx of     Osteoporosis No family hx of     Genetic Disorder No family hx of     Thyroid Disease No family hx of     Obesity No family hx of     Unknown/Adopted No family hx of        Allergy    Allergies   Allergen Reactions    Propofol Nausea and Vomiting    Shellfish Allergy      Other reaction(s): Dizziness    Capsaicin Rash and Blisters    Capsaicin Blisters and Rash    Tegaderm Transparent Dressing (Informational Only) Itching and Rash       Current Outpatient Medications   Medication    acetaminophen (TYLENOL) 325 MG tablet    busPIRone (BUSPAR) 7.5 MG tablet    diazepam (VALIUM) 2 MG tablet    DULoxetine (CYMBALTA) 60 MG capsule    glipiZIDE (GLUCOTROL) 10 MG tablet    glipiZIDE (GLUCOTROL) 5 MG tablet    hydrocortisone (CORTAID) 1 % external cream    hydrOXYzine (ATARAX) 25 MG tablet    lamoTRIgine (LAMICTAL) 100 MG tablet    melatonin 3 MG tablet    metFORMIN (GLUCOPHAGE XR) 500 MG 24 hr tablet    miconazole (MICATIN) 2 % external powder    miconazole (MICATIN) 2 % external powder    omeprazole (PRILOSEC) 20 MG DR capsule    polyethylene glycol (MIRALAX) 17 GM/Dose powder    predniSONE (DELTASONE) 1 MG tablet    predniSONE (DELTASONE) 5 MG tablet    pregabalin (LYRICA) 100 MG capsule    triamterene-HCTZ (DYAZIDE) 37.5-25 MG capsule    blood glucose (ACCU-CHEK FASTCLIX) lancing device    blood glucose (CONTOUR TEST) test strip     CONTOUR NEXT TEST test strip    oxyCODONE-acetaminophen (PERCOCET) 5-325 MG tablet     No current facility-administered medications for this visit.       Physical Exam:   /73   Pulse 98   Wt 93.4 kg (206 lb)   LMP  (LMP Unknown)   BMI 35.36 kg/m   No LMP recorded (lmp unknown). Patient is postmenopausal. Body mass index is 35.36 kg/m .    Gen:  is alert, comfortable in no acute distress,   Abdomen: Abdomen is soft, non-tender, non-distended,   Lungs: non-labored breathing.       Pelvic Exam:   Normal external female genitalia. The urethra was Normal. Nolasco in . Concentrated urine in bag , draining well   Vagina: severe atrophy, normal support, no myalgia  Uterus: Normal size, non tender   Ovaries: No palpable mass , limited exam due to habitus  Vulva: No lesions, no pain   Rectal: Deferred   Pelvic floor muscles: No myalgia    POPQ EXAM FOR PROLAPSE SEVERITY  No prolapse    Voiding trial:  Deferred: nolasco in place    Labs:   Color Urine (no units)   Date Value   02/05/2024 Yellow   04/12/2016 Yellow     Appearance Urine (no units)   Date Value   02/05/2024 Turbid (A)   04/12/2016 Clear     Glucose Urine (mg/dL)   Date Value   02/05/2024 >1000 (A)   04/12/2016 Negative     Bilirubin Urine (no units)   Date Value   02/05/2024 Negative   04/12/2016 Negative     Ketones Urine (mg/dL)   Date Value   02/05/2024 Negative   04/12/2016 10 (A)     Specific Gravity Urine (no units)   Date Value   02/05/2024 1.015   04/12/2016 1.019     pH Urine   Date Value   02/05/2024 5.5   04/12/2016 5.5 pH     Protein Albumin Urine (mg/dL)   Date Value   02/05/2024 Negative   04/12/2016 Negative     Urobilinogen Urine (EU/dL)   Date Value   03/14/2016 0.2     Nitrite Urine (no units)   Date Value   02/05/2024 Negative   04/12/2016 Negative     Leukocyte Esterase Urine (no units)   Date Value   02/05/2024 75 Angy/uL (A)   04/12/2016 Small (A)     CBC RESULTS:   Recent Labs   Lab Test 02/05/24  0542 02/02/24  0627   WBC  --  6.5    RBC  --  4.27   HGB 8.6* 8.5*   HCT  --  32.3*   MCV  --  76*   MCH  --  19.9*   MCHC  --  26.3*   RDW  --  19.9*   PLT  --  362       A/P: Jumana Yang is a 83 year old F with     Tonya was seen today for consult.    Diagnoses and all orders for this visit:    Vaginal atrophy  -     estradiol (ESTRACE) 0.1 MG/GM vaginal cream; Place 1 g vaginally three times a week    Mixed incontinence  -     estradiol (ESTRACE) 0.1 MG/GM vaginal cream; Place 1 g vaginally three times a week      Urgency>> stress leakage  Multiple risk factors: obesity, poor mobility, atrophy, diabetes , age  Estrace cream for atroph prescribed  She is currently still not strong enough to leave her transition facility and is working with PT to be stronger to go to the bathroom etc. Recommended that they call and come back to get a voiding trial with us when she is physically stronger to see if she can go home without a catheter when it is time to leave the transition home in a few weeks. Phone number provided and message sent to our nursing team . Once her nolasco is out and we know her baseline bladder function, we can discuss management for her incontinence.      I spent a total of 60 minutes with  Jumana Yang ( and her son)  on the date of the encounter in chart review, face to face patient visit, review of tests, documentation and/or discussion with other providers about the issues documented above.     Rajwinder Clifton MD, [facility]  , Department of OBGYN  Female Pelvic Medicine and Reconstructive Surgery ( Urogynecology)  CC  Patient Care Team:  Ananya Mclean NP as PCP - General (Nurse Practitioner - Adult Health)  Herminio Goodman MBBS as Assigned Rheumatology Provider  Abbi Caro AuD as Audiologist (Audiology)  Ananya OLPEZ as   Ibeth Foster MD as Anesthesiologist (Pain Medicine)  Ibeth Foster MD as Anesthesiologist (Pain Medicine)  Ananya Mclean NP (Nurse Practitioner - Adult  Health)  Caitlin Osullivan PA-C as Assigned Heart and Vascular Provider  [facility]  Davina Hatfield AuD as Audiologist (Audiology)  Jorge Froeman MD as MD (OB/Gyn)  Jorge Foreman MD as Assigned OBGYN Provider  SELF, REFERRED

## 2024-02-15 NOTE — TELEPHONE ENCOUNTER
I did print and fax the AVS summary for this patient. It was faxed to [phone] Attn:Remedios Daily.

## 2024-02-22 ENCOUNTER — ALLIED HEALTH/NURSE VISIT (OUTPATIENT)
Dept: INTERPRETER SERVICES | Facility: CLINIC | Age: 84
End: 2024-02-22
Attending: OBSTETRICS & GYNECOLOGY
Payer: MEDICARE

## 2024-02-22 DIAGNOSIS — N32.89 BLADDER SPASMS: Primary | ICD-10-CM

## 2024-02-22 PROCEDURE — 99207 PR NO CHARGE NURSE ONLY: CPT | Performed by: OBSTETRICS & GYNECOLOGY

## 2024-02-22 RX ORDER — PHENAZOPYRIDINE HYDROCHLORIDE 200 MG/1
200 TABLET, FILM COATED ORAL 3 TIMES DAILY PRN
Qty: 21 TABLET | Refills: 0 | Status: SHIPPED | OUTPATIENT
Start: 2024-02-22

## 2024-02-22 NOTE — PROGRESS NOTES
Tonya here with her son for trial of void and catheter removal.      She states that she is feeling more discomfort in her bladder in the past few days and reports that somebody stepped on the bag at her transitional care and tugged it fairly hard.    Emptied catheter and attempted to instill 300 mls of normal saline in the bladder.  After approximately 100 mls, Tonya's bladder was beginning to spasm and push the fluid back out.      Discussed with Dr Foreman and plan was made to replace cath x 1 more week and prescribe pyridium to help reduce bladder spasms.    2 attempts were made and catheter was placed.  Tonya expressed anxiety and discomfort through this procedure, but did tolerate insertion and was discharged from clinic with her son.      Plan is to return in one week for repeat trial of void.

## 2024-02-29 ENCOUNTER — ALLIED HEALTH/NURSE VISIT (OUTPATIENT)
Dept: INTERPRETER SERVICES | Facility: CLINIC | Age: 84
End: 2024-02-29
Payer: MEDICARE

## 2024-02-29 DIAGNOSIS — Z46.6 URINARY CATHETER CHANGE REQUIRED: Primary | ICD-10-CM

## 2024-02-29 PROCEDURE — 99207 PR NO BILLABLE SERVICE THIS VISIT: CPT

## 2024-02-29 NOTE — PROGRESS NOTES
Tonya is here today with her son for a repeat trial of void and catheter removal or replacement.    Catheter emptied, and attempted to instill 300 mls of saline.  After approximately 150 mls, Tonya began leaking urine around catheter and unable to instill additional saline.    Removed catheter and assisted Tonya to the bathroom.  She was able to void approximately 100 mls and leaked a small amount of additional urine prior to void.    Decision made to leave catheter out.    Instructions provided to transitional care center to assist with toileting as needed, provide mary care and to observe for urinary retention.  These instructions were also reviewed with Tonya and her son.    She will make an appointment to see Dr Clifton to further discuss her concerns with urinary incontinence.

## 2024-03-21 ENCOUNTER — TRANSFERRED RECORDS (OUTPATIENT)
Dept: HEALTH INFORMATION MANAGEMENT | Facility: CLINIC | Age: 84
End: 2024-03-21
Payer: MEDICARE

## 2024-03-21 LAB — HBA1C MFR BLD: 8.1 % (ref 4.8–5.6)

## 2024-03-22 NOTE — TELEPHONE ENCOUNTER
Lichen Striatus-  In patients with lightly pigmented skin, lichen striatus presents with a sudden eruption of flat-topped, erythematous or skin-colored papules typically arranged in a linear band that follows the lines of Blaschko. The papules are discrete, 1 to 4 mm in diameter, with a smooth or scaly surface. Vesicles can be rarely observed.    In both presentations, the band is usually narrow, solitary, and unilateral and may be continuous or interrupted.    The extremities are most commonly involved.Bilateral or multiple bands have also been described.    Lichen striatus is usually asymptomatic. Pruritus has been reported in up to one-third of patients, most often in individuals with atopy.    Clinical course -- On average, the active phase of lichen striatus lasts six months, ranging from less than three months to several years. Lesions then resolve spontaneously, and may leave a little bit of skin lightening or darkening (hypo or hyper pigmentation) that will resolve slowly over 1-3 years.      TREATMENT---Lichen striatus is a benign, self-limited condition that is often asymptomatic; therefore, treatment is often not necessary. Patients and their parents or caregivers should be reassured that the eruption will resolve spontaneously in several months without scarring, leaving a transient hypopigmentation. The hypopigmentation can last several years.    Low- to mid-potency topical corticosteroids may be used for itching but they have no effect on the duration of the disease or postinflammatory dyspigmentation.        LAB LOCATIONS  [facility] (Pediatrics Only)  [address] [address] Directions   [phone]  Fax:[phone]   M-F 7:30 a.m. to 5 p.m.  Sa 8 a.m. to 1 p.m.  Walk-In ONLY-  First come, first served     Routing refill request to provider for review/approval because:  Blood pressure out of range

## 2024-03-26 ENCOUNTER — OFFICE VISIT (OUTPATIENT)
Dept: UROLOGY | Facility: CLINIC | Age: 84
End: 2024-03-26
Attending: OBSTETRICS & GYNECOLOGY
Payer: MEDICARE

## 2024-03-26 VITALS — HEART RATE: 86 BPM | DIASTOLIC BLOOD PRESSURE: 72 MMHG | SYSTOLIC BLOOD PRESSURE: 133 MMHG

## 2024-03-26 DIAGNOSIS — N39.46 MIXED INCONTINENCE URGE AND STRESS (MALE)(FEMALE): Primary | ICD-10-CM

## 2024-03-26 PROCEDURE — G0463 HOSPITAL OUTPT CLINIC VISIT: HCPCS | Performed by: OBSTETRICS & GYNECOLOGY

## 2024-03-26 PROCEDURE — 99214 OFFICE O/P EST MOD 30 MIN: CPT | Performed by: OBSTETRICS & GYNECOLOGY

## 2024-03-26 RX ORDER — SOLIFENACIN SUCCINATE 5 MG/1
5 TABLET, FILM COATED ORAL DAILY
Qty: 90 TABLET | Refills: 3 | Status: SHIPPED | OUTPATIENT
Start: 2024-03-26

## 2024-03-26 RX ORDER — SOLIFENACIN SUCCINATE 5 MG/1
5 TABLET, FILM COATED ORAL DAILY
Qty: 90 TABLET | Refills: 3 | Status: SHIPPED | OUTPATIENT
Start: 2024-03-26 | End: 2024-03-26

## 2024-03-26 RX ORDER — BENZONATATE 100 MG/1
CAPSULE ORAL
COMMUNITY
Start: 2024-03-11

## 2024-03-26 NOTE — NURSING NOTE
Chief Complaint   Patient presents with    Follow Up     Vaginal atrophy    Lacy Golden LPN       Urine output 50 ml      PVR  16 ml

## 2024-03-26 NOTE — LETTER
"3/26/2024       RE: Jumana Yang  [address]  [place] [address]     Dear Colleague,    Thank you for referring your patient, Jumana Yang, to the [facility] [place] [facility]. Please see a copy of my visit note below.    Ms Yang is here to follow up for her mixed urinary incontinence.  She had a nolasco catheter after being in the hospital for Covid and had urinary retention. She has been at a transitional recovery facility and the plan was for us to do a voiding trial to remove the catheter once she is strong enough and ready to return home. See detailed note from her new patient visit with me on 24. It seems like she came and had a voiding trial with our nurses on 24 where they were only able to instill 150cc of fluid in her bladder ( did not tolerate more and had leakage around the catheter) and she was able to void 100cc. She was using estrace cream when she was at the facility but she has not used it since coming home. She does have a nurse who comes and helps two times a week and will need instructions to restart the cream.     Today she says that since the catheter has been removed, her incontinence has gotten worse. She primarily leaks a large amount of urine \" almost like emptying her bladder\" every time she has the urge. She says that she never has time to make it to the bathroom ( she is wheel chair bound as well which makes mobility very difficult\". She says she wakes 4-5 times at night to go to the bathroom and has to clean up every time. She says this is absolutely miserable for her.         This note was copied and pasted from Dr Clifton on 24      CC: urinary incontinence    HPI:  Jumana Yang is a 83 year old  with many medical comorbidities including hx of spinal stenosis ( on wheel chair), bipolar disorder, COPD, DM ( last HgA1c on 24 was 8.5), cHTN, IBS, Chronic pain and steroid " meds who presents for evaluation of urgency urinary incontinence. She had seen Dr Foreman previously who had advised on stopping her oxybutin and start vesicare. She says she doesn't remember this plan and unlikely that she is taking vesicare . Two weeks ago, she ended up in the hospital for COVID infection and was sent to transitional recovery facility with a nolasco catheter for what sounds like urinary retention. She says she had a really bad experience with the nolasco placement and is very anxious about it being removed. Also she is still in transitional home having some PT and is not yet stable enough to reliably ambulate to the bathroom frequently.       Urinary Symptoms/Voiding function  Without the catheter, she normally leaks large volumes with urgency and sometimes without and sensory warning. Does not report leakage with typical stress triggers but sounds like she leaks with movement ( getting from sitting to standing) etc.     Pelvic Organ Prolapse Symptoms  Denies vaginal bulge, pressure sensation or protrusion.    Gastrointestinal Symptoms:  Has IBS and alternates between constipation and diarrhea. Chronic pain meds likely contributing.  A bit better recently with modification of her meds.     Sexual function/Pelvic floor pain/GYN:   Does not report pelvic pain today. No vaginal bleeding      Relevant Medical History:    Diabetes? yes  High Blood pressure? yes     Recurrent UTIs? no  Sleep Apnea? No  Obesity? Body mass index is 35.36 kg/m .  History of Blood clots? no  Other medical problems:  As above      Physical Exam:   /73   Pulse 98   Wt 93.4 kg (206 lb)   LMP  (LMP Unknown)   BMI 35.36 kg/m   No LMP recorded (lmp unknown). Patient is postmenopausal. Body mass index is 35.36 kg/m .    Gen:  is alert, comfortable in no acute distress,   Abdomen: Abdomen is soft, non-tender, non-distended,   Lungs: non-labored breathing.       Pelvic Exam:   Normal external female genitalia. The urethra was  Normal. Nolasco in . Concentrated urine in bag , draining well   Vagina: severe atrophy, normal support, no myalgia  Uterus: Normal size, non tender   Ovaries: No palpable mass , limited exam due to habitus  Vulva: No lesions, no pain   Rectal: Deferred   Pelvic floor muscles: No myalgia    POPQ EXAM FOR PROLAPSE SEVERITY  No prolapse    Voiding trial:  Deferred: nolasco in place       Voiding check  Voided 50cc  PVR by bladder scan was 16 ml      Assessment and Plan  Tonya was seen today for follow up.    Diagnoses and all orders for this visit:    Mixed incontinence urge and stress (male)(female)  -     Discontinue: solifenacin (VESICARE) 5 MG tablet; Take 1 tablet (5 mg) by mouth daily  -     solifenacin (VESICARE) 5 MG tablet; Take 1 tablet (5 mg) by mouth daily      Urge predominant mixed incontinence  No longer responding to oxybutinin  Will start vesicare for now.   Encouraged restarting estrace cream. Instructions given so she can share with her home nurse  Virtual follow up with her and her son ( who is with her today) in 2 months . If vesicare does not help, will try B3 agonist next. If that fails, then likely other options such as botox, PTNS or SNM.       I spent 30 minutes face-to-face with Jumana Yang on the date of the encounter in chart review, patient visit, review of tests, documentation and/or discussion with other providers about the issues documented above.     Sincerely,    Rajwinder Clifton MD

## 2024-03-26 NOTE — PATIENT INSTRUCTIONS
Thank you for trusting us with your care!     If you need to contact us for questions about:  Symptoms, Scheduling & Medical Questions; Non-urgent (2-3 day response) [employer] message, Urgent (needing response today) [phone] (if after 3:30pm next day response)   Prescriptions: Please call your Pharmacy   Billing: Bailee [phone] or  Physicians:[phone]       Dear Ms Yang,     Nice to see you today.     Today I have prescribed  you a different bladder medication called vesicare to your preferred pharmacy. You just take it once a day    Please make sure you stop taking the oxybutinin ( your previous bladder medication th at is not working)    Also make sure you use your vaginal estrogen cream 2-3 times a week to reduce bladder/vaginal irritation and urgency sensations    Follow up with me virtually over video in 2-3 months after trying the new medication      Please call our clinic with any questions at  or send a My chart message    Rajwinder Clifton MD, MCR  Female Pelvic Medicine and Reconstructive Surgery  [facility]

## 2024-03-26 NOTE — PROGRESS NOTES
"Ms Yang is here to follow up for her mixed urinary incontinence.  She had a nolasco catheter after being in the hospital for Covid and had urinary retention. She has been at a transitional recovery facility and the plan was for us to do a voiding trial to remove the catheter once she is strong enough and ready to return home. See detailed note from her new patient visit with me on 24. It seems like she came and had a voiding trial with our nurses on 24 where they were only able to instill 150cc of fluid in her bladder ( did not tolerate more and had leakage around the catheter) and she was able to void 100cc. She was using estrace cream when she was at the facility but she has not used it since coming home. She does have a nurse who comes and helps two times a week and will need instructions to restart the cream.     Today she says that since the catheter has been removed, her incontinence has gotten worse. She primarily leaks a large amount of urine \" almost like emptying her bladder\" every time she has the urge. She says that she never has time to make it to the bathroom ( she is wheel chair bound as well which makes mobility very difficult\". She says she wakes 4-5 times at night to go to the bathroom and has to clean up every time. She says this is absolutely miserable for her.         This note was copied and pasted from Dr Clifton on 24      CC: urinary incontinence    HPI:  Jumana Yang is a 83 year old  with many medical comorbidities including hx of spinal stenosis ( on wheel chair), bipolar disorder, COPD, DM ( last HgA1c on 24 was 8.5), cHTN, IBS, Chronic pain and steroid meds who presents for evaluation of urgency urinary incontinence. She had seen Dr Foreman previously who had advised on stopping her oxybutin and start vesicare. She says she doesn't remember this plan and unlikely that she is taking vesicare . Two weeks ago, she ended up in the hospital for COVID infection and was " sent to transitional recovery facility with a nolasco catheter for what sounds like urinary retention. She says she had a really bad experience with the nolasco placement and is very anxious about it being removed. Also she is still in transitional home having some PT and is not yet stable enough to reliably ambulate to the bathroom frequently.       Urinary Symptoms/Voiding function  Without the catheter, she normally leaks large volumes with urgency and sometimes without and sensory warning. Does not report leakage with typical stress triggers but sounds like she leaks with movement ( getting from sitting to standing) etc.     Pelvic Organ Prolapse Symptoms  Denies vaginal bulge, pressure sensation or protrusion.    Gastrointestinal Symptoms:  Has IBS and alternates between constipation and diarrhea. Chronic pain meds likely contributing.  A bit better recently with modification of her meds.     Sexual function/Pelvic floor pain/GYN:   Does not report pelvic pain today. No vaginal bleeding      Relevant Medical History:    Diabetes? yes  High Blood pressure? yes     Recurrent UTIs? no  Sleep Apnea? No  Obesity? Body mass index is 35.36 kg/m .  History of Blood clots? no  Other medical problems:  As above      Physical Exam:   /73   Pulse 98   Wt 93.4 kg (206 lb)   LMP  (LMP Unknown)   BMI 35.36 kg/m   No LMP recorded (lmp unknown). Patient is postmenopausal. Body mass index is 35.36 kg/m .    Gen:  is alert, comfortable in no acute distress,   Abdomen: Abdomen is soft, non-tender, non-distended,   Lungs: non-labored breathing.       Pelvic Exam:   Normal external female genitalia. The urethra was Normal. Nolasco in . Concentrated urine in bag , draining well   Vagina: severe atrophy, normal support, no myalgia  Uterus: Normal size, non tender   Ovaries: No palpable mass , limited exam due to habitus  Vulva: No lesions, no pain   Rectal: Deferred   Pelvic floor muscles: No myalgia    POPQ EXAM FOR PROLAPSE  SEVERITY  No prolapse    Voiding trial:  Deferred: nolasco in place       Voiding check  Voided 50cc  PVR by bladder scan was 16 ml      Assessment and Plan  Tonya was seen today for follow up.    Diagnoses and all orders for this visit:    Mixed incontinence urge and stress (male)(female)  -     Discontinue: solifenacin (VESICARE) 5 MG tablet; Take 1 tablet (5 mg) by mouth daily  -     solifenacin (VESICARE) 5 MG tablet; Take 1 tablet (5 mg) by mouth daily      Urge predominant mixed incontinence  No longer responding to oxybutinin  Will start vesicare for now.   Encouraged restarting estrace cream. Instructions given so she can share with her home nurse  Virtual follow up with her and her son ( who is with her today) in 2 months . If vesicare does not help, will try B3 agonist next. If that fails, then likely other options such as botox, PTNS or SNM.       I spent 30 minutes face-to-face with Jumana Yang on the date of the encounter in chart review, patient visit, review of tests, documentation and/or discussion with other providers about the issues documented above.

## 2024-04-02 ENCOUNTER — TELEPHONE (OUTPATIENT)
Dept: WOUND CARE | Facility: CLINIC | Age: 84
End: 2024-04-02
Payer: MEDICARE

## 2024-04-02 NOTE — TELEPHONE ENCOUNTER
Patient's home care nurse left a vm 4/2/24 asking how she can get more wound care supplies for the patient, especially for her left heel wound, as she is out of supplies.     [phone]

## 2024-04-02 NOTE — TELEPHONE ENCOUNTER
Returned call to Abbi. Discussed that supplies cannot be ordered as the patient has not had a visit with [facility] since Jan 2024. Additionally, the patient has Medicare and home care so home care is supposed to provide the dressings. Abbi will contact her primary care provider to see if they are able to assist in the patient getting supplies. Of note, the patient had an appointment today that was cancelled.

## 2024-04-03 NOTE — PROGRESS NOTES
DISCHARGE  Reason for Discharge: Change in medical status. COVID hospitalized pt, currently working with HH PT. Hoping to start OP PT once appropriate.    Equipment Issued: none    Discharge Plan: Patient to continue home program.    Referring Provider:  Caitlin Osullivan

## 2024-04-11 ENCOUNTER — MEDICAL CORRESPONDENCE (OUTPATIENT)
Dept: HEALTH INFORMATION MANAGEMENT | Facility: CLINIC | Age: 84
End: 2024-04-11
Payer: MEDICARE

## 2024-04-16 ENCOUNTER — TRANSCRIBE ORDERS (OUTPATIENT)
Dept: OTHER | Age: 84
End: 2024-04-16

## 2024-04-16 DIAGNOSIS — E11.42 TYPE 2 DIABETES MELLITUS WITH DIABETIC POLYNEUROPATHY (H): Primary | ICD-10-CM

## 2024-05-01 ENCOUNTER — HOSPITAL ENCOUNTER (OUTPATIENT)
Dept: WOUND CARE | Facility: CLINIC | Age: 84
Discharge: HOME | End: 2024-05-01
Attending: FAMILY MEDICINE
Payer: MEDICARE

## 2024-05-01 VITALS — DIASTOLIC BLOOD PRESSURE: 63 MMHG | SYSTOLIC BLOOD PRESSURE: 149 MMHG | TEMPERATURE: 96.1 F | HEART RATE: 92 BPM

## 2024-05-01 DIAGNOSIS — L89.623 STAGE III PRESSURE ULCER OF LEFT HEEL (H): ICD-10-CM

## 2024-05-01 DIAGNOSIS — S81.801D WOUND OF RIGHT LOWER EXTREMITY, SUBSEQUENT ENCOUNTER: Primary | ICD-10-CM

## 2024-05-01 PROCEDURE — 99214 OFFICE O/P EST MOD 30 MIN: CPT | Performed by: FAMILY MEDICINE

## 2024-05-01 NOTE — DISCHARGE INSTRUCTIONS
05/01/2024   Tonya Yang   1940    A DME order for supplies has been placed to [facility]. If there are any issues with your order including not receiving the order please call our clinic at [phone]. Do not call [facility]. We are better able to help you. We can contact the [facility] rep to better assist than if you call the general [facility] number. We can provide a tracking number also if needed.     [facility] is now sending an ancillary kit free of charge. This kit includes gauze, gloves, and saline. You will receive 1 kit per 15 days of the supply order. We typically order 30 days of supplies so you will receive 2 kits. Please let us know if you would not like to receive this kit and we can communicate this to [facility].    Please ship supplies to [facility] at [address] [facility] [address]    Dressing changes outside of clinic are being performed by Home Care and Caregiver        [facility] Phone [phone]; Fax [phone]  CHERELLE Osullivan  Dressing changes outside of clinic are being performed by Home Care (2x per week)  and PCA (other days)      Plan:  - Avoid pressure to heel - especially when you are moving around with the wheelchair  - Do not shower with dressings on; take them off before showering. Do wound care afterward (including compression)  - Keep blood sugars below 150 and A1C below 7  - See dermatologist for fissures on fingers      Daily skin care to hand fissures:  - Do not use antibacterial soap  - Avoid   - Apply thick, high quality moisturizer twice daily until you're seen by dermatologist  (i.e. CeraVe, Eucerin, Aquaphor or Vanicream)      Wound Dressing Change: left heel  - Wash your hands with soap and water before you begin your dressing change and prepare a clean surface for dressings.  - Cleanse with mild unscented soap and water (such as Cetaphil, Cerave or Dove)  - Apply thin layer zinc oxide barrier cream to protect skin around wound  - Apply 1/15th  piece of 5x9 xeroform to wound bed  - Apply one 4x4 Zetuvit plus silicone border  - Apply thick, high-quality moisturizer to intact skin on both legs (such as CeraVe, Eucerin, Aquaphor or Vanicream)  - Apply compression stocking (OTC 15-20mmHg knee-high) and/or velcro wraps  Change daily    Right leg skin care, daily:  - Cleanse with mild unscented soap and water (such as Cetaphil, Cerave or Dove)  - Apply thick, high-quality moisturizer to intact skin (such as CeraVe, Eucerin, Aquaphor nor Vanicream)  - Apply compression stocking (OTC 15-20mmHg knee-high)      Elevate: your legs above the level of your heart for 30 minutes: 2-3 times each day  Ways to do this:  - Lay on the couch or your bed and prop your legs up on pillows. Pillows should be  under your calves in bed so your feet are floating in the air and no pressure is on the wound.  - Recline back as far as you can go in your recliner and prop your legs on pillows.      Compression:  Compression stockings/velcro wraps can be removed at night and put back on first  thing in the morning.  Please remove compression dressing if toes turn blue and/or tingle and can not be  relieved by raising the leg for one hour. If unable to reapply in the morning, keep  compression on until next dressing change.  Walk as much as you can, as you are able.      Low sodium, high protein diet:  A diet high in protein is important for wound healing, we recommend getting up to 90  grams of protein per day.  Taking protein shakes or bars are a good way to get extra protein in your diet.     Main Provider: Woo Lew M.D. May 1, 2024    Call us at [phone] if you have any questions about your wounds, if you have redness or swelling around your wound, have a fever of 101 degrees Fahrenheit or greater or if you have any other problems or concerns. We answer the phone Monday through Friday 8 am to 4 pm, please leave a message as we check the voicemail frequently throughout the  day. If you have a concern over the weekend, please leave a message and we will return your call Monday. If the need is urgent, go to the ER or urgent care.    If you had a positive experience please indicate that on your patient satisfaction survey form that [facility] will be sending you.    It was a pleasure meeting with you today.  Thank you for allowing me and my team the privilege of caring for you today.  YOU are the reason we are here, and I truly hope we provided you with the excellent service you deserve.  Please let us know if there is anything else we can do for you so that we can be sure you are leaving completely satisfied with your care experience.      If you have any billing related questions please call the [facility] Business office at [phone]. The clinic staff does not handle billing related matters.    If you are scheduled to have a follow up appointment, you will receive a reminder call the day before your visit. On the appointment day please arrive 15 minutes prior to your appointment time. If you are unable to keep that appointment, please call the clinic to cancel or reschedule. If you are more than 10 minutes late or greater for your scheduled appointment time, the clinic policy is that you may be asked to reschedule.

## 2024-05-01 NOTE — PROGRESS NOTES
Wound Clinic Note         Visit date: 05/01/2024       Norbert Complaint:     Jumana Yang is a 83 year old   female had concerns including WOUND CARE.  The patient has lower extremity edema and a left heel wound.         HISTORY OF PRESENT ILLNESS:    Jumana Yang reports the wound has been present since early August.  The wound began without a clear cause.   Prior to this she has never had other difficult to heal wounds on the lower extremities.    I last saw this patient in the wound clinic on January 9, 2024 at which time her left heel wound was scabbed over.  She subsequently was admitted to the hospital with COVID-19 and then was discharged to a TCU.  She is now back at home and has home health nurses coming out changing the bandages 3 times a week.    The left heel wound has been bandaged with Xeroform and a Mepilex bandage and her compression stocking changed 3 times a week by the home health nurses.  Although she does not wear her compression stocking into the wound clinic today.  There is been light serous drainage from the wound.  There is been no difficulties with the dressing changes.    She is careful to make sure that there is no pressure applied to the heel at any time.      The pateint denies fevers or chills.  They report the pain from the wound has been 0/10 and has remained about the same recently.      The patient reports they only occasionally lay down to elevate her legs above the level of their heart, but not twice a day. She does sleep flat laying on a couch.    Today the patient reports maintaining a high protein diet, but has not been taking protein supplements lately.        The patient denies a history of smoking or chronic steroid use.  The patient confirms having diabetes and reports the blood sugars are well controlled.         The patient has not had any symptoms of infection relating to the wound recently and is not currently on antibiotics.       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       Surgical Hx:   Past Surgical History:   Procedure Laterality Date    CATARACT IOL, RT/LT Left 7/2013    FOOT SURGERY      toe    GI SURGERY      IR LUMBAR/SACRAL TRANSFOR INJ BILATERAL Bilateral 3/11/2022    LAPAROSCOPIC HEPATECTOMY PARTIAL  11/4/2013    Procedure: LAPAROSCOPIC HEPATECTOMY PARTIAL;  Laparoscopic Debridement of Liver Abcess;  Surgeon: Sepideh Green MD;  Location: [facility] OR    ORTHOPEDIC SURGERY      PICC INSERTION  8/28/2013    5fr DL Power PICC, 41cm (1cm external length) in the R lateral brachial vein with tip in the SVC RA junction.    RECTAL SURGERY      1970s    RELEASE CARPAL TUNNEL Left 3/21/2023    Procedure: LEFT OPEN CARPAL TUNNEL RELEASE. LEFT RING FINGER A1 PULLEY RELEASE;  Surgeon: Claire Hamilton MD;  Location: [facility] OR    VASCULAR SURGERY            Allergies:    Allergies   Allergen Reactions    Propofol Nausea and Vomiting    Shellfish Allergy      Other reaction(s): Dizziness    Capsaicin Rash and Blisters    Capsaicin Blisters and Rash    Tegaderm Transparent Dressing (Informational Only) Itching and Rash               Medication History:    Current Outpatient Medications   Medication Sig Dispense Refill    acetaminophen (TYLENOL) 325 MG tablet 325 mg every 6 hours as needed      benzonatate (TESSALON) 100 MG capsule TAKE 1 CAP BY MOUTH THREE TIMES DAILY AS NEEDED      blood glucose (ACCU-CHEK FASTCLIX) lancing device FOR TESTING ONCE DAILY. DX  E11.9 TYPE 2 DIABETES      blood glucose (CONTOUR TEST) test strip TESTING EVERY DAY DX  E11.9      busPIRone (BUSPAR) 7.5 MG tablet Take 7.5 mg by mouth 2 times daily      CONTOUR NEXT TEST test strip TESTING EVERY DAY DX  E11.9      diazepam (VALIUM) 2 MG tablet Take 1 tablet (2 mg) by mouth every 6 hours as needed for anxiety 10 tablet 0    DULoxetine (CYMBALTA) 60 MG capsule Take 60 mg by mouth every morning      estradiol (ESTRACE) 0.1 MG/GM vaginal cream Place 1 g vaginally three times a week 42.5 g 3    glipiZIDE (GLUCOTROL) 10 MG tablet Take 10 mg by mouth daily (with breakfast)      glipiZIDE (GLUCOTROL) 5 MG tablet Take 5 mg by mouth daily (with dinner)      hydrocortisone (CORTAID) 1 % external cream Apply topically 2 times daily as needed (hemorrhoid irritation) To hemorrhoids.      hydrOXYzine (ATARAX) 25 MG tablet Take 25 mg by mouth 3 times daily as needed for anxiety      lamoTRIgine (LAMICTAL) 100 MG tablet Take 100 mg by mouth 2 times daily      melatonin 3 MG tablet Take 1 tablet (3 mg) by mouth nightly as needed for sleep      metFORMIN (GLUCOPHAGE XR) 500 MG 24 hr tablet Take 500 mg by mouth daily (with breakfast)      miconazole (MICATIN) 2 % external powder Apply topically 2 times daily as needed for other (candidiasis/intertrigo) To skin folds      omeprazole (PRILOSEC) 20 MG DR capsule Take 20 mg by mouth daily      oxyCODONE-acetaminophen (PERCOCET) 5-325 MG tablet Take 1 tablet by mouth 4 times daily as needed for pain 5 tablet 0    phenazopyridine (PYRIDIUM) 200 MG tablet Take 1 tablet (200 mg) by mouth 3 times daily as needed for irritation 21 tablet 0     polyethylene glycol (MIRALAX) 17 GM/Dose powder Take 17 g by mouth daily      predniSONE (DELTASONE) 1 MG tablet Take 1 mg by mouth every morning (In addition to the 5 mg dose; 1 mg + 5 mg = 6 mg)      predniSONE (DELTASONE) 5 MG tablet Take 5 mg by mouth every morning (In addition to the 1 mg dose; 5 mg + 1 mg = 6 mg)      pregabalin (LYRICA) 100 MG capsule Take 100 mg by mouth 2 times daily      solifenacin (VESICARE) 5 MG tablet Take 1 tablet (5 mg) by mouth daily 90 tablet 3    triamterene-HCTZ (DYAZIDE) 37.5-25 MG capsule Take 1 capsule by mouth every morning Hold for SBP<110       No current facility-administered medications for this encounter.         Tobacco History:  reports that she quit smoking about 36 years ago. Her smoking use included cigarettes. She has quit using smokeless tobacco.       REVIEW OF SYMPTOMS:   The review of systems was negative except as noted in the HPI.           PHYSICAL EXAMINATION:     BP (!) 149/63 (BP Location: Left arm, Patient Position: Sitting)   Pulse 92   Temp (!) 96.1  F (35.6  C) (Temporal)   LMP  (LMP Unknown)            GENERAL: The patient overall appears well and is no acute distress.   HEAD: normocephalic   EYES: Sclera and conjunctiva clear   NECK: no obvious masses   LUNGS: breathing is unlabored.   EXTREMITIES: No clubbing, cyanosis or edema   SKIN: No rashes or other abnormalities except as noted under the Wound section below.   NEUROLOGICAL: normal motor and sensory function   EDEMA: Moderate  and Lymphedema       WOUND: The wound appears healthy with no sign of infection.   Wound bed: granulation tissue  Periwound: healthy intact skin  The wound is much smaller today compared with the pictures from when she was in the hospital.      Also see below for wound details:       Circumferential volume measures:             No data to display                Ulceration(s)/Wound(s):   Please see the media tab under the chart review for pictures of the  wounds.  Nursing staff removed dressings and cleansed wound.    Wound (used by OP [facility] only) 09/27/23 1326 Left heel pressure injury (Active)   Thickness/Stage Stage 3 05/01/24 1303   Base pink;moist;slough 05/01/24 1303   Periwound macerated 05/01/24 1303   Periwound Temperature warm 05/01/24 1303   Periwound Skin Turgor soft 05/01/24 1303   Edges callused 05/01/24 1303   Length (cm) 1.7 05/01/24 1303   Width (cm) 1.1 05/01/24 1303   Depth (cm) 0.1 05/01/24 1303   Wound (cm^2) 1.87 cm^2 05/01/24 1303   Wound Volume (cm^3) 0.19 cm^3 05/01/24 1303   Wound healing % 34.39 05/01/24 1303   Drainage Characteristics/Odor serosanguineous 05/01/24 1303   Drainage Amount moderate 05/01/24 1303   Care, Wound non-select wound debridement performed. 05/01/24 1303                       Recent Labs   Lab Test 03/21/23  1025 03/08/22  0626 07/20/21  0614   A1C 6.9* 8.8* 10.8*          Recent Labs   Lab Test 05/22/23  1316 07/03/22  2204 06/16/22  2302   ALBUMIN 3.5 3.8 3.6              No sharp debridement performed today.                  ASSESSMENT:   This is a 83 year old  female with a stage III left heel pressure injury, the patient also has lower extremity edema which was also managed during today's clinic visit.          PLAN:   We will bandage the left heel wound with Xeroform and a Mepilex bandage and her compression stockings change 3 times a week.     I have encouraged her to continue to keep pressure off of the heel area as much as she can during the day and the night.  On October 9, 2023 she had an ankle-brachial index checked which did not show any evidence of arterial insufficiency.      Separate from the wound care instructions we then discussed management strategies for lower extremity edema.  I explained the keys for managing lower extremity edema are compression and elevation.  I have again explained to the patient today that controlling the edema is probably the most important thing we can do to help heal the  wound.  I have specifically recommended that they lay down with their legs above the level of the heart for 30 minutes at least twice a day.  I emphasized that if we can not control the edema we will likely not be able to get this wound to heal.     I have encouraged the patient to continue on their high protein diet to aid in wound healing.   The patient will return to the wound clinic in 4 weeks to see me again.        30 minutes spent on the date of the encounter doing chart review, history and exam, documentation and further activities per the note, this time excludes any procedure time      Woo Lew MD  05/01/2024   1:44 PM   [facility] V[facility]/Wound  [phone]    This note was electronically signed by Woo Lew MD        Further instructions from your care team         05/01/2024   Tonya Yang   1940    A DME order for supplies has been placed to [employer]. If there are any issues with your order including not receiving the order please call our clinic at [phone]. Do not call [facility]. We are better able to help you. We can contact the [employer] rep to better assist than if you call the general [employer] number. We can provide a tracking number also if needed.     [employer] is now sending an ancillary kit free of charge. This kit includes gauze, gloves, and saline. You will receive 1 kit per 15 days of the supply order. We typically order 30 days of supplies so you will receive 2 kits. Please let us know if you would not like to receive this kit and we can communicate this to [facility].    Please ship supplies to [facility] at [address] [place] [id number]    Dressing changes outside of clinic are being performed by Home Care and Caregiver        [facility] Phone [phone]; Fax [phone]  CHERELLE Osullivan  Dressing changes outside of clinic are being performed by Home Care (2x per week)  and PCA (other days)      Plan:  - Avoid pressure to heel -  especially when you are moving around with the wheelchair  - Do not shower with dressings on; take them off before showering. Do wound care afterward (including compression)  - Keep blood sugars below 150 and A1C below 7  - See dermatologist for fissures on fingers      Daily skin care to hand fissures:  - Do not use antibacterial soap  - Avoid   - Apply thick, high quality moisturizer twice daily until you're seen by dermatologist  (i.e. CeraVe, Eucerin, Aquaphor or Vanicream)      Wound Dressing Change: left heel  - Wash your hands with soap and water before you begin your dressing change and prepare a clean surface for dressings.  - Cleanse with mild unscented soap and water (such as Cetaphil, Cerave or Dove)  - Apply thin layer zinc oxide barrier cream to protect skin around wound  - Apply 1/15th piece of 5x9 xeroform to wound bed  - Apply one 4x4 Zetuvit plus silicone border  - Apply thick, high-quality moisturizer to intact skin on both legs (such as CeraVe, Eucerin, Aquaphor or Vanicream)  - Apply compression stocking (OTC 15-20mmHg knee-high) and/or velcro wraps  Change daily    Right leg skin care, daily:  - Cleanse with mild unscented soap and water (such as Cetaphil, Cerave or Dove)  - Apply thick, high-quality moisturizer to intact skin (such as CeraVe, Eucerin, Aquaphor nor Vanicream)  - Apply compression stocking (OTC 15-20mmHg knee-high)      Elevate: your legs above the level of your heart for 30 minutes: 2-3 times each day  Ways to do this:  - Lay on the couch or your bed and prop your legs up on pillows. Pillows should be  under your calves in bed so your feet are floating in the air and no pressure is on the wound.  - Recline back as far as you can go in your recliner and prop your legs on pillows.      Compression:  Compression stockings/velcro wraps can be removed at night and put back on first  thing in the morning.  Please remove compression dressing if toes turn blue and/or tingle  and can not be  relieved by raising the leg for one hour. If unable to reapply in the morning, keep  compression on until next dressing change.  Walk as much as you can, as you are able.      Low sodium, high protein diet:  A diet high in protein is important for wound healing, we recommend getting up to 90  grams of protein per day.  Taking protein shakes or bars are a good way to get extra protein in your diet.     Main Provider: Woo Lew M.D. May 1, 2024    Call us at [phone] if you have any questions about your wounds, if you have redness or swelling around your wound, have a fever of 101 degrees Fahrenheit or greater or if you have any other problems or concerns. We answer the phone Monday through Friday 8 am to 4 pm, please leave a message as we check the voicemail frequently throughout the day. If you have a concern over the weekend, please leave a message and we will return your call Monday. If the need is urgent, go to the ER or urgent care.    If you had a positive experience please indicate that on your patient satisfaction survey form that [facility] will be sending you.    It was a pleasure meeting with you today.  Thank you for allowing me and my team the privilege of caring for you today.  YOU are the reason we are here, and I truly hope we provided you with the excellent service you deserve.  Please let us know if there is anything else we can do for you so that we can be sure you are leaving completely satisfied with your care experience.      If you have any billing related questions please call the [facility] Business office at [phone]. The clinic staff does not handle billing related matters.    If you are scheduled to have a follow up appointment, you will receive a reminder call the day before your visit. On the appointment day please arrive 15 minutes prior to your appointment time. If you are unable to keep that appointment, please call the clinic to cancel or  reschedule. If you are more than 10 minutes late or greater for your scheduled appointment time, the clinic policy is that you may be asked to reschedule.         ,

## 2024-05-07 ENCOUNTER — TELEPHONE (OUTPATIENT)
Dept: WOUND CARE | Facility: CLINIC | Age: 84
End: 2024-05-07
Payer: MEDICARE

## 2024-05-07 DIAGNOSIS — L89.623 STAGE III PRESSURE ULCER OF LEFT HEEL (H): Primary | ICD-10-CM

## 2024-05-07 NOTE — ADDENDUM NOTE
Encounter addended by: Dionna Pradhan RN on: 5/7/2024 11:11 AM   Actions taken: Edited Discharge Instructions

## 2024-05-07 NOTE — TELEPHONE ENCOUNTER
A supply order was not sent. DME order placed to [employer] today. Voicemail left with Markel informing him of this.  
Another voicemail left with Markel informing the supply order was sent to [employer].  
Patient's friend/nurse called to follow up on the wound care supply order.  Wondering where it was sent as it has not been received.  Markel   
[phone]

## 2024-05-28 ENCOUNTER — VIRTUAL VISIT (OUTPATIENT)
Dept: UROLOGY | Facility: CLINIC | Age: 84
End: 2024-05-28
Attending: OBSTETRICS & GYNECOLOGY
Payer: MEDICARE

## 2024-05-28 VITALS — BODY MASS INDEX: 29.66 KG/M2 | HEIGHT: 65 IN | WEIGHT: 178 LBS

## 2024-05-28 DIAGNOSIS — N39.46 MIXED INCONTINENCE: Primary | ICD-10-CM

## 2024-05-28 PROCEDURE — 99213 OFFICE O/P EST LOW 20 MIN: CPT | Mod: 95 | Performed by: OBSTETRICS & GYNECOLOGY

## 2024-05-28 ASSESSMENT — PAIN SCALES - GENERAL: PAINLEVEL: SEVERE PAIN (6)

## 2024-05-28 NOTE — PROGRESS NOTES
Virtual Visit Details    Type of service:  Video Visit converted to telephone visit due to connection issues  Time on telephone: 12 minutes    Originating Location (pt. Location): Home    Distant Location (provider location):  On-site        Ms Yang is follow up for telephone visit for her ugency predominant mixed incontinence. I last saw her on 3.26.24 at which time I changed her bladder medication from oxybutinin to vesicare as the former was not helping. Today she says she is very happy that the vesicare is working great. She says she is about 95% controlled which is great. She continues to use her vaginal estrace cream about 2-3 times a week.     Assessment and plan  Encounter Diagnosis   Name Primary?    Mixed incontinence Yes     Urgency>> stress  Doing great on vesicare  Continue current treatment  Follow up with me in one year or sooner if needed

## 2024-05-28 NOTE — LETTER
5/28/2024       RE: Jumana Yang  [address]  [place] [address]     Dear Colleague,    Thank you for referring your patient, Jumana Yang, to the [facility] [place] at [facility]. Please see a copy of my visit note below.    Virtual Visit Details    Type of service:  Video Visit converted to telephone visit due to connection issues  Time on telephone: 12 minutes    Originating Location (pt. Location): Home    Distant Location (provider location):  On-site    Ms Yang is follow up for telephone visit for her ugency predominant mixed incontinence. I last saw her on 3.26.24 at which time I changed her bladder medication from oxybutinin to vesicare as the former was not helping. Today she says she is very happy that the vesicare is working great. She says she is about 95% controlled which is great. She continues to use her vaginal estrace cream about 2-3 times a week.     Assessment and plan  Encounter Diagnosis   Name Primary?    Mixed incontinence Yes     Urgency>> stress  Doing great on vesicare  Continue current treatment  Follow up with me in one year or sooner if needed    Rajwinder Clifton MD

## 2024-06-14 ENCOUNTER — DOCUMENTATION ONLY (OUTPATIENT)
Dept: AUDIOLOGY | Facility: CLINIC | Age: 84
End: 2024-06-14
Payer: MEDICARE

## 2024-06-14 NOTE — PROGRESS NOTES
"Walk-in hearing aid services on 6/14/24: The patient asked to have her left earmold tubing replaced. Examination found the earmold tubing was quite hard and the earmold had wax build up on th end of the canal. The left hearing aid and earmold were cleaned and the earmold tubing was replaced. A listening check found the hearing aid to be working well and it was returned to the patient. She stated she feels the earmold doesn't fit well and is always working out of her ear. It was observed that the patient hadn't fully inserted the earmold properly in her ear. She was reminded to make sure the earmold is fully seated when she initially places it in her ear. She also felt the sound quality of the hearing aid wasn't always good and also had questions about a procedure her \"ear doctor\" recommended to her. She was advised to schedule an earmold impression appointment to begin the process of obtaining a new earmold. Regarding her questions about the procedure that was recommended to her for her ear, she was told to follow up with the provider who recommended them to her.    [place]  Audiology Clinic Assistant  "

## 2024-06-18 ENCOUNTER — MEDICAL CORRESPONDENCE (OUTPATIENT)
Dept: HEALTH INFORMATION MANAGEMENT | Facility: CLINIC | Age: 84
End: 2024-06-18
Payer: MEDICARE

## 2024-06-19 ENCOUNTER — HOSPITAL ENCOUNTER (OUTPATIENT)
Dept: WOUND CARE | Facility: CLINIC | Age: 84
Discharge: HOME | End: 2024-06-19
Attending: FAMILY MEDICINE
Payer: MEDICARE

## 2024-06-19 VITALS — TEMPERATURE: 96.8 F | HEART RATE: 74 BPM | SYSTOLIC BLOOD PRESSURE: 144 MMHG | DIASTOLIC BLOOD PRESSURE: 71 MMHG

## 2024-06-19 DIAGNOSIS — S81.801D WOUND OF RIGHT LOWER EXTREMITY, SUBSEQUENT ENCOUNTER: Primary | ICD-10-CM

## 2024-06-19 DIAGNOSIS — L89.623 STAGE III PRESSURE ULCER OF LEFT HEEL (H): ICD-10-CM

## 2024-06-19 PROCEDURE — 99214 OFFICE O/P EST MOD 30 MIN: CPT | Performed by: FAMILY MEDICINE

## 2024-06-19 NOTE — DISCHARGE INSTRUCTIONS
06/19/2024   Tonya Yang   1940    A DME order was not completed because the supplies are ordered by home care or at a care facility   [facility] Phone [phone]; Fax [phone] CHERELLE Osullivan  Dressing changes outside of clinic are being performed by Home Care (2x per week) and PCA (other days)     Plan 6-19-24:  - Avoid pressure to heel - especially when you are moving around with the wheelchair  - Do not shower with dressings on; take them off before showering. Do wound care afterward (including compression)  - Work with your diabetic provider to help you manage your blood sugars.    Wound Dressing Change: Left Heel  - Wash your hands with soap and water before you begin your dressing change and prepare a clean surface for dressings.  - Cleanse with mild unscented soap and water (such as Cetaphil, Cerave or Dove)  - Apply thin layer zinc oxide barrier cream to protect skin around wound  - Apply Xeroform to wound bed (Cut to fit wound bed)  - Cover with foam bordered dressing   - Apply thick, high-quality moisturizer to intact skin on both legs (such as CeraVe, Eucerin, Aquaphor or Vanicream)  - Apply compression stocking (OTC 15-20mmHg knee-high) and/or velcro wraps  Change daily    Elevate:   Elevate your legs above the level of your heart for 30 minutes: 2-3 times each day  Ways to do this:  - Lay on the couch or your bed and prop your legs up on pillows.   - Pillows should be under your calves in bed so your feet are floating in the air and no pressure is on the wound.  - Recline back as far as you can go in your recliner and prop your legs on pillows.     Compression:  Compression stockings/velcro wraps can be removed at night and put back on first  thing in the morning.  Please remove compression dressing if toes turn blue and/or tingle and can not be  relieved by raising the leg for one hour. If unable to reapply in the morning, keep  compression on until next dressing change.  Walk  as much as you can, as you are able.      Low sodium, high protein diet:  A diet high in protein is important for wound healing, we recommend getting up to 90 grams of protein per day.  Taking protein shakes or bars are a good way to get extra protein in your diet.        Main Provider: Woo Lew M.D. June 19, 2024    Call us at [phone] if you have any questions about your wounds, if you have redness or swelling around your wound, have a fever of 101 degrees Fahrenheit or greater or if you have any other problems or concerns. We answer the phone Monday through Friday 8 am to 4 pm, please leave a message as we check the voicemail frequently throughout the day. If you have a concern over the weekend, please leave a message and we will return your call Monday. If the need is urgent, go to the ER or urgent care.    If you had a positive experience please indicate that on your patient satisfaction survey form that [facility] will be sending you.    It was a pleasure meeting with you today.  Thank you for allowing me and my team the privilege of caring for you today.  YOU are the reason we are here, and I truly hope we provided you with the excellent service you deserve.  Please let us know if there is anything else we can do for you so that we can be sure you are leaving completely satisfied with your care experience.      If you have any billing related questions please call the [facility] Business office at [phone]. The clinic staff does not handle billing related matters.    If you are scheduled to have a follow up appointment, you will receive a reminder call the day before your visit. On the appointment day please arrive 15 minutes prior to your appointment time. If you are unable to keep that appointment, please call the clinic to cancel or reschedule. If you are more than 10 minutes late or greater for your scheduled appointment time, the clinic policy is that you may be asked to  reschedule.

## 2024-06-19 NOTE — PROGRESS NOTES
Wound Clinic Note         Visit date: 06/19/2024       Norbert Complaint:     Jumana Yang is a 83 year old   female had concerns including WOUND CARE.  The patient has lower extremity edema and a left heel wound.         HISTORY OF PRESENT ILLNESS:    Jumana Yang reports the wound has been present since early August.  The wound began without a clear cause.   Prior to this she has never had other difficult to heal wounds on the lower extremities.    I last saw this patient in the wound clinic on January 9, 2024 at which time her left heel wound was scabbed over.  She subsequently was admitted to the hospital with COVID-19 and then was discharged to a TCU.  She is now back at home and has home health nurses coming out changing the bandages 3 times a week.    The left heel wound has been bandaged with Xeroform and a Mepilex bandage and her compression stocking changed 3 times a week by the home health nurses.  Although she again does not wear her compression stocking into the wound clinic today.  There is been light serous drainage from the wound.  There is been no difficulties with the dressing changes.    She is careful to make sure that there is no pressure applied to the heel at any time.      The pateint denies fevers or chills.  They report the pain from the wound has been 0/10 and has remained about the same recently.      The patient reports they only occasionally lay down to elevate her legs above the level of their heart, but not twice a day. She does sleep flat laying on a couch.    Today the patient reports maintaining a high protein diet, but has not been taking protein supplements lately.        The patient denies a history of smoking or chronic steroid use.  The patient confirms having diabetes and reports the blood sugars are well controlled.         The patient has not had any symptoms of infection relating to the wound recently and is not currently on antibiotics.       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       Surgical Hx:   Past Surgical History:   Procedure Laterality Date    CATARACT IOL, RT/LT Left 7/2013    FOOT SURGERY      toe    GI SURGERY      IR LUMBAR/SACRAL TRANSFOR INJ BILATERAL Bilateral 3/11/2022    LAPAROSCOPIC HEPATECTOMY PARTIAL  11/4/2013    Procedure: LAPAROSCOPIC HEPATECTOMY PARTIAL;  Laparoscopic Debridement of Liver Abcess;  Surgeon: Sepideh Green MD;  Location: [facility] OR    ORTHOPEDIC SURGERY      PICC INSERTION  8/28/2013    5fr DL Power PICC, 41cm (1cm external length) in the R lateral brachial vein with tip in the SVC RA junction.    RECTAL SURGERY      1970s    RELEASE CARPAL TUNNEL Left 3/21/2023    Procedure: LEFT OPEN CARPAL TUNNEL RELEASE. LEFT RING FINGER A1 PULLEY RELEASE;  Surgeon: Claire Hamilton MD;  Location: [facility] OR    VASCULAR SURGERY            Allergies:    Allergies   Allergen Reactions    Propofol Nausea and Vomiting    Shellfish Allergy      Other reaction(s): Dizziness    Capsaicin Rash and Blisters    Capsaicin Blisters and Rash    Tegaderm Transparent Dressing (Informational Only) Itching and Rash               Medication History:    Current Outpatient Medications   Medication Sig Dispense Refill    acetaminophen (TYLENOL) 325 MG tablet 325 mg every 6 hours as needed      benzonatate (TESSALON) 100 MG capsule TAKE 1 CAP BY MOUTH THREE TIMES DAILY AS NEEDED      blood glucose (ACCU-CHEK FASTCLIX) lancing device FOR TESTING ONCE DAILY. DX  E11.9 TYPE 2 DIABETES      blood glucose (CONTOUR TEST) test strip TESTING EVERY DAY DX  E11.9      busPIRone (BUSPAR) 7.5 MG tablet Take 7.5 mg by mouth 2 times daily      CONTOUR NEXT TEST test strip TESTING EVERY DAY DX  E11.9      diazepam (VALIUM) 2 MG tablet Take 1 tablet (2 mg) by mouth every 6 hours as needed for anxiety 10 tablet 0    DULoxetine (CYMBALTA) 60 MG capsule Take 60 mg by mouth every morning      estradiol (ESTRACE) 0.1 MG/GM vaginal cream Place 1 g vaginally three times a week 42.5 g 3    glipiZIDE (GLUCOTROL) 10 MG tablet Take 10 mg by mouth daily (with breakfast)      glipiZIDE (GLUCOTROL) 5 MG tablet Take 5 mg by mouth daily (with dinner)      hydrocortisone (CORTAID) 1 % external cream Apply topically 2 times daily as needed (hemorrhoid irritation) To hemorrhoids.      hydrOXYzine (ATARAX) 25 MG tablet Take 25 mg by mouth 3 times daily as needed for anxiety      lamoTRIgine (LAMICTAL) 100 MG tablet Take 100 mg by mouth 2 times daily      melatonin 3 MG tablet Take 1 tablet (3 mg) by mouth nightly as needed for sleep      metFORMIN (GLUCOPHAGE XR) 500 MG 24 hr tablet Take 500 mg by mouth daily (with breakfast)      miconazole (MICATIN) 2 % external powder Apply topically 2 times daily as needed for other (candidiasis/intertrigo) To skin folds      omeprazole (PRILOSEC) 20 MG DR capsule Take 20 mg by mouth daily      oxyCODONE-acetaminophen (PERCOCET) 5-325 MG tablet Take 1 tablet by mouth 4 times daily as needed for pain 5 tablet 0    phenazopyridine (PYRIDIUM) 200 MG tablet Take 1 tablet (200 mg) by mouth 3 times daily as needed for irritation 21 tablet 0     polyethylene glycol (MIRALAX) 17 GM/Dose powder Take 17 g by mouth daily      predniSONE (DELTASONE) 1 MG tablet Take 1 mg by mouth every morning (In addition to the 5 mg dose; 1 mg + 5 mg = 6 mg)      predniSONE (DELTASONE) 5 MG tablet Take 5 mg by mouth every morning (In addition to the 1 mg dose; 5 mg + 1 mg = 6 mg)      pregabalin (LYRICA) 100 MG capsule Take 100 mg by mouth 2 times daily      solifenacin (VESICARE) 5 MG tablet Take 1 tablet (5 mg) by mouth daily 90 tablet 3    triamterene-HCTZ (DYAZIDE) 37.5-25 MG capsule Take 1 capsule by mouth every morning Hold for SBP<110       No current facility-administered medications for this encounter.         Tobacco History:  reports that she quit smoking about 36 years ago. Her smoking use included cigarettes. She has quit using smokeless tobacco.       REVIEW OF SYMPTOMS:   The review of systems was negative except as noted in the HPI.           PHYSICAL EXAMINATION:     BP (!) 144/71 (BP Location: Left arm, Patient Position: Sitting, Cuff Size: Adult Regular)   Pulse 74   Temp 96.8  F (36  C) (Temporal)   LMP  (LMP Unknown)            GENERAL: The patient overall appears well and is no acute distress.   HEAD: normocephalic   EYES: Sclera and conjunctiva clear   NECK: no obvious masses   LUNGS: breathing is unlabored.   EXTREMITIES: No clubbing, cyanosis or edema   SKIN: No rashes or other abnormalities except as noted under the Wound section below.   NEUROLOGICAL: normal motor and sensory function   EDEMA: Moderate  and Lymphedema       WOUND: The wound appears healthy with no sign of infection.   Wound bed: granulation tissue  Periwound: healthy intact skin  The wound is again much smaller today compared with her last clinic visit and is nearly healed.      Also see below for wound details:       Circumferential volume measures:             No data to display                Ulceration(s)/Wound(s):   Please see the media tab under the chart review for pictures  of the wounds.  Nursing staff removed dressings and cleansed wound.    Wound (used by OP [facility] only) 09/27/23 1326 Left heel pressure injury (Active)   Thickness/Stage Stage 3 06/19/24 1334   Base pink;slough 06/19/24 1334   Periwound macerated 06/19/24 1334   Periwound Temperature warm 06/19/24 1334   Periwound Skin Turgor soft 06/19/24 1334   Edges callused 06/19/24 1334   Length (cm) 0.7 06/19/24 1334   Width (cm) 0.6 06/19/24 1334   Depth (cm) 0.2 06/19/24 1334   Wound (cm^2) 0.42 cm^2 06/19/24 1334   Wound Volume (cm^3) 0.08 cm^3 06/19/24 1334   Wound healing % 85.26 06/19/24 1334   Drainage Characteristics/Odor serosanguineous 06/19/24 1334   Drainage Amount moderate 06/19/24 1334   Care, Wound non-select wound debridement performed. 06/19/24 1334                         Recent Labs   Lab Test 03/21/23  1025 03/08/22  0626 07/20/21  0614   A1C 6.9* 8.8* 10.8*          Recent Labs   Lab Test 05/22/23  1316 07/03/22  2204 06/16/22  2302   ALBUMIN 3.5 3.8 3.6              No sharp debridement performed today.                  ASSESSMENT:   This is a 83 year old  female with a stage III left heel pressure injury, the patient also has lower extremity edema which was also managed during today's clinic visit.          PLAN:   We will bandage the left heel wound with Xeroform and a Mepilex bandage and her compression stockings change 3 times a week.  I strongly encouraged her to wear her compression stockings every day.    I have encouraged her to continue to keep pressure off of the heel area as much as she can during the day and the night.  On October 9, 2023 she had an ankle-brachial index checked which did not show any evidence of arterial insufficiency.      Separate from the wound care instructions we then discussed management strategies for lower extremity edema.  I explained the keys for managing lower extremity edema are compression and elevation.  I have again explained to the patient today that controlling  the edema is probably the most important thing we can do to help heal the wound.  I have specifically recommended that they lay down with their legs above the level of the heart for 30 minutes at least twice a day.  I emphasized that if we can not control the edema we will likely not be able to get this wound to heal.     I have encouraged the patient to continue on their high protein diet to aid in wound healing.   The patient will return to the wound clinic in 4 weeks to see me again.        30 minutes spent on the date of the encounter doing chart review, history and exam, documentation and further activities per the note, this time excludes any procedure time      Woo Lew MD  06/19/2024   1:52 PM   [facility]ound  [phone]    This note was electronically signed by Woo Lew MD        Further instructions from your care team         06/19/2024   Tonya Yang   1940    A DME order was not completed because the supplies are ordered by home care or at a care facility   [facility] Phone [phone]; Fax [phone] CHERELLE Osullivan  Dressing changes outside of clinic are being performed by Home Care (2x per week) and PCA (other days)     Plan 6-19-24:  - Avoid pressure to heel - especially when you are moving around with the wheelchair  - Do not shower with dressings on; take them off before showering. Do wound care afterward (including compression)  - Work with your diabetic provider to help you manage your blood sugars.    Wound Dressing Change: Left Heel  - Wash your hands with soap and water before you begin your dressing change and prepare a clean surface for dressings.  - Cleanse with mild unscented soap and water (such as Cetaphil, Cerave or Dove)  - Apply thin layer zinc oxide barrier cream to protect skin around wound  - Apply Xeroform to wound bed (Cut to fit wound bed)  - Cover with foam bordered dressing   - Apply thick, high-quality  moisturizer to intact skin on both legs (such as CeraVe, Eucerin, Aquaphor or Vanicream)  - Apply compression stocking (OTC 15-20mmHg knee-high) and/or velcro wraps  Change daily    Elevate:   Elevate your legs above the level of your heart for 30 minutes: 2-3 times each day  Ways to do this:  - Lay on the couch or your bed and prop your legs up on pillows.   - Pillows should be under your calves in bed so your feet are floating in the air and no pressure is on the wound.  - Recline back as far as you can go in your recliner and prop your legs on pillows.     Compression:  Compression stockings/velcro wraps can be removed at night and put back on first  thing in the morning.  Please remove compression dressing if toes turn blue and/or tingle and can not be  relieved by raising the leg for one hour. If unable to reapply in the morning, keep  compression on until next dressing change.  Walk as much as you can, as you are able.      Low sodium, high protein diet:  A diet high in protein is important for wound healing, we recommend getting up to 90 grams of protein per day.  Taking protein shakes or bars are a good way to get extra protein in your diet.        Main Provider: Woo Lew M.D. June 19, 2024    Call us at [phone] if you have any questions about your wounds, if you have redness or swelling around your wound, have a fever of 101 degrees Fahrenheit or greater or if you have any other problems or concerns. We answer the phone Monday through Friday 8 am to 4 pm, please leave a message as we check the voicemail frequently throughout the day. If you have a concern over the weekend, please leave a message and we will return your call Monday. If the need is urgent, go to the ER or urgent care.    If you had a positive experience please indicate that on your patient satisfaction survey form that [facility] will be sending you.    It was a pleasure meeting with you today.  Thank you for allowing  me and my team the privilege of caring for you today.  YOU are the reason we are here, and I truly hope we provided you with the excellent service you deserve.  Please let us know if there is anything else we can do for you so that we can be sure you are leaving completely satisfied with your care experience.      If you have any billing related questions please call the [facility] Business office at [phone]. The clinic staff does not handle billing related matters.    If you are scheduled to have a follow up appointment, you will receive a reminder call the day before your visit. On the appointment day please arrive 15 minutes prior to your appointment time. If you are unable to keep that appointment, please call the clinic to cancel or reschedule. If you are more than 10 minutes late or greater for your scheduled appointment time, the clinic policy is that you may be asked to reschedule.        ,

## 2024-07-24 ENCOUNTER — HOSPITAL ENCOUNTER (OUTPATIENT)
Dept: WOUND CARE | Facility: CLINIC | Age: 84
Discharge: HOME | End: 2024-07-24
Attending: FAMILY MEDICINE | Admitting: FAMILY MEDICINE
Payer: MEDICARE

## 2024-07-24 VITALS — HEART RATE: 76 BPM | SYSTOLIC BLOOD PRESSURE: 140 MMHG | TEMPERATURE: 96.7 F | DIASTOLIC BLOOD PRESSURE: 69 MMHG

## 2024-07-24 DIAGNOSIS — L89.623 STAGE III PRESSURE ULCER OF LEFT HEEL (H): Primary | ICD-10-CM

## 2024-07-24 PROCEDURE — 99214 OFFICE O/P EST MOD 30 MIN: CPT | Performed by: FAMILY MEDICINE

## 2024-07-24 PROCEDURE — G0463 HOSPITAL OUTPT CLINIC VISIT: HCPCS

## 2024-07-24 NOTE — DISCHARGE INSTRUCTIONS
07/24/2024   Tonya Yang   1940    No supplies needed; left heel is healed; will continue with simple bandaid after showering and drying thoroughly.  Please ship supplies to [facility] at [address] [facility] [address]     Dressing changes outside of clinic are being performed by Home Care and Caregiver      [facility] Phone [phone]; Fax [phone]  CHERELLE Osullivan  Dressing changes outside of clinic are being performed by Home Care (2x per week) and PCA (other days)        Plan: 7/24/24  - no need to return here to this clinic unless your wound re opens or restarts to drain  - you are healed; continue with simple bandaid for 1-2 weeks; no longer need the xeroform (yellow cling dressing)  - you continue to be at risk for re injury to that area; healed skin is still not as strong as skin that hasn't been injured  - Avoid pressure to heel - especially when you are moving around with the wheelchair  - Do not shower with dressings on; take them off before showering. Do wound care afterward (including compression)  - Keep blood sugars below 150 and A1C below 7  - See dermatologist for fissures on fingers       Skin care: left heel  - Wash your hands with soap and water before you begin your dressing change and prepare a clean surface for dressings.  - Cleanse with mild unscented soap and water (such as Cetaphil, Cerave or Dove)  - Apply one simple bandaid for 1-2 more weeks (can stop dressing once the 1-2 weeks has passed and you do not notice any drainage)  - Apply thick, high-quality moisturizer to intact skin on both legs (such as CeraVe, Eucerin, Aquaphor or Vanicream)  - Apply compression stocking (OTC 15-20mmHg knee-high) and/or velcro wraps  Change daily     Right leg skin care, daily:  - Cleanse with mild unscented soap and water (such as Cetaphil, Cerave or Dove)  - Apply thick, high-quality moisturizer to intact skin (such as CeraVe, Eucerin, Aquaphor nor Vanicream)  - Apply  compression stocking (OTC 15-20mmHg knee-high)  Wear in the morning til sleep; remove for sleeping        Elevate: your legs above the level of your heart for 30 minutes: 2-3 times each day  Ways to do this:  - Lay on the couch or your bed and prop your legs up on pillows. Pillows should be  under your calves in bed so your feet are floating in the air and no pressure is on the wound.  - Recline back as far as you can go in your recliner and prop your legs on pillows.        Compression:  Compression stockings/velcro wraps can be removed at night and put back on first thing in the morning.  Please remove compression dressing if toes turn blue and/or tingle and can not be  relieved by raising the leg for one hour. If unable to reapply in the morning, keep  compression on until next dressing change.  Walk as much as you can, as you are able.        Low sodium, high protein diet:  A diet high in protein is important for wound healing, we recommend getting up to 90  grams of protein per day.  Taking protein shakes or bars are a good way to get extra protein in your diet.        Main Provider: Woo Lew M.D. July 24, 2024    Call us at [phone] if you have any questions about your wounds, if you have redness or swelling around your wound, have a fever of 101 degrees Fahrenheit or greater or if you have any other problems or concerns. We answer the phone Monday through Friday 8 am to 4 pm, please leave a message as we check the voicemail frequently throughout the day. If you have a concern over the weekend, please leave a message and we will return your call Monday. If the need is urgent, go to the ER or urgent care.    If you had a positive experience please indicate that on your patient satisfaction survey form that [facility] will be sending you.    It was a pleasure meeting with you today.  Thank you for allowing me and my team the privilege of caring for you today.  YOU are the reason we are  here, and I truly hope we provided you with the excellent service you deserve.  Please let us know if there is anything else we can do for you so that we can be sure you are leaving completely satisfied with your care experience.      If you have any billing related questions please call the [facility] Business office at [phone]. The clinic staff does not handle billing related matters.    If you are scheduled to have a follow up appointment, you will receive a reminder call the day before your visit. On the appointment day please arrive 15 minutes prior to your appointment time. If you are unable to keep that appointment, please call the clinic to cancel or reschedule. If you are more than 10 minutes late or greater for your scheduled appointment time, the clinic policy is that you may be asked to reschedule.

## 2024-07-24 NOTE — PROGRESS NOTES
Patient and son arrived for wound care visit. Certified Wound Care Nurse time spent evaluating patient record, and completed a full evaluation; provided recommendation based on treatment plan. Reviewed discharge instructions, patient education, and discussed plan of care with appropriate medical team staff members and patient and/or family members.

## 2024-07-24 NOTE — PROGRESS NOTES
Wound Clinic Note         Visit date: 07/24/2024       Cheif Complaint:     Jumana Yang is a 83 year old   female had concerns including WOUND CARE.  The patient has lower extremity edema and a left heel wound.         HISTORY OF PRESENT ILLNESS:    Jumana Yang reports the wound has been present since early August.  The wound began without a clear cause.   Prior to this she has never had other difficult to heal wounds on the lower extremities.      The left heel wound has been bandaged with Xeroform and a Mepilex bandage and her compression stocking changed 3 times a week by the home health nurses.  For the last week or 2 there is been no drainage from the area.    She is careful to make sure that there is no pressure applied to the heel at any time.      The pateint denies fevers or chills.  They report the pain from the wound has been 0/10 and has remained about the same recently.      The patient reports they only occasionally lay down to elevate her legs above the level of their heart, but not twice a day. She does sleep flat laying on a couch.    Today the patient reports maintaining a high protein diet, but has not been taking protein supplements lately.        The patient denies a history of smoking or chronic steroid use.  The patient confirms having diabetes and reports the blood sugars are well controlled.         The patient has not had any symptoms of infection relating to the wound recently and is not currently on antibiotics.       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       Surgical Hx:   Past Surgical History:   Procedure Laterality Date    CATARACT IOL, RT/LT Left 7/2013    FOOT SURGERY      toe    GI SURGERY      IR LUMBAR/SACRAL TRANSFOR INJ BILATERAL Bilateral 3/11/2022    LAPAROSCOPIC HEPATECTOMY PARTIAL  11/4/2013    Procedure: LAPAROSCOPIC HEPATECTOMY PARTIAL;  Laparoscopic Debridement of Liver Abcess;  Surgeon: Sepideh Green MD;  Location: [facility] OR    ORTHOPEDIC SURGERY      PICC INSERTION  8/28/2013    5fr DL Power PICC, 41cm (1cm external length) in the R lateral brachial vein with tip in the SVC RA junction.    RECTAL SURGERY      1970s    RELEASE CARPAL TUNNEL Left 3/21/2023    Procedure: LEFT OPEN CARPAL TUNNEL RELEASE. LEFT RING FINGER A1 PULLEY RELEASE;  Surgeon: Claire Hamilton MD;  Location: [facility] OR    VASCULAR SURGERY            Allergies:    Allergies   Allergen Reactions    Propofol Nausea and Vomiting    Shellfish Allergy      Other reaction(s): Dizziness    Capsaicin Rash and Blisters    Capsaicin Blisters and Rash    Tegaderm Transparent Dressing (Informational Only) Itching and Rash              Medication History:    Current Outpatient Medications   Medication Sig Dispense Refill    acetaminophen (TYLENOL) 325 MG tablet 325 mg every 6 hours as needed      benzonatate (TESSALON) 100 MG capsule TAKE 1 CAP BY MOUTH THREE TIMES DAILY AS NEEDED      blood glucose (ACCU-CHEK FASTCLIX) lancing device FOR TESTING ONCE DAILY. DX  E11.9 TYPE 2 DIABETES      blood glucose (CONTOUR TEST) test strip TESTING EVERY DAY DX  E11.9      busPIRone  (BUSPAR) 7.5 MG tablet Take 7.5 mg by mouth 2 times daily      CONTOUR NEXT TEST test strip TESTING EVERY DAY DX  E11.9      diazepam (VALIUM) 2 MG tablet Take 1 tablet (2 mg) by mouth every 6 hours as needed for anxiety 10 tablet 0    DULoxetine (CYMBALTA) 60 MG capsule Take 60 mg by mouth every morning      estradiol (ESTRACE) 0.1 MG/GM vaginal cream Place 1 g vaginally three times a week 42.5 g 3    glipiZIDE (GLUCOTROL) 10 MG tablet Take 10 mg by mouth daily (with breakfast)      glipiZIDE (GLUCOTROL) 5 MG tablet Take 5 mg by mouth daily (with dinner)      hydrocortisone (CORTAID) 1 % external cream Apply topically 2 times daily as needed (hemorrhoid irritation) To hemorrhoids.      hydrOXYzine (ATARAX) 25 MG tablet Take 25 mg by mouth 3 times daily as needed for anxiety      lamoTRIgine (LAMICTAL) 100 MG tablet Take 100 mg by mouth 2 times daily      melatonin 3 MG tablet Take 1 tablet (3 mg) by mouth nightly as needed for sleep      metFORMIN (GLUCOPHAGE XR) 500 MG 24 hr tablet Take 500 mg by mouth daily (with breakfast)      miconazole (MICATIN) 2 % external powder Apply topically 2 times daily as needed for other (candidiasis/intertrigo) To skin folds      omeprazole (PRILOSEC) 20 MG DR capsule Take 20 mg by mouth daily      oxyCODONE-acetaminophen (PERCOCET) 5-325 MG tablet Take 1 tablet by mouth 4 times daily as needed for pain 5 tablet 0    phenazopyridine (PYRIDIUM) 200 MG tablet Take 1 tablet (200 mg) by mouth 3 times daily as needed for irritation 21 tablet 0    polyethylene glycol (MIRALAX) 17 GM/Dose powder Take 17 g by mouth daily      predniSONE (DELTASONE) 1 MG tablet Take 1 mg by mouth every morning (In addition to the 5 mg dose; 1 mg + 5 mg = 6 mg)      predniSONE (DELTASONE) 5 MG tablet Take 5 mg by mouth every morning (In addition to the 1 mg dose; 5 mg + 1 mg = 6 mg)      pregabalin (LYRICA) 100 MG capsule Take 100 mg by mouth 2 times daily      solifenacin (VESICARE) 5 MG tablet Take 1  tablet (5 mg) by mouth daily 90 tablet 3    triamterene-HCTZ (DYAZIDE) 37.5-25 MG capsule Take 1 capsule by mouth every morning Hold for SBP<110       No current facility-administered medications for this encounter.         Tobacco History:  reports that she quit smoking about 36 years ago. Her smoking use included cigarettes. She has quit using smokeless tobacco.       REVIEW OF SYMPTOMS:   The review of systems was negative except as noted in the HPI.           PHYSICAL EXAMINATION:     BP (!) 140/69   Pulse 76   Temp (!) 96.7  F (35.9  C)   LMP  (LMP Unknown)            GENERAL: The patient overall appears well and is no acute distress.   HEAD: normocephalic   EYES: Sclera and conjunctiva clear   NECK: no obvious masses   LUNGS: breathing is unlabored.   EXTREMITIES: No clubbing, cyanosis or edema   SKIN: No rashes or other abnormalities except as noted under the Wound section below.   NEUROLOGICAL: normal motor and sensory function   EDEMA: Moderate  and Lymphedema       WOUND: Healed      Also see below for wound details:       Circumferential volume measures:             No data to display                Ulceration(s)/Wound(s):   Please see the media tab under the chart review for pictures of the wounds.  Nursing staff removed dressings and cleansed wound.    Wound (used by OP [facility] only) 09/27/23 1326 Left heel pressure injury (Active)   Thickness/Stage Stage 3 07/24/24 1322   Base closed/resurfaced 07/24/24 1322   Periwound dry 07/24/24 1322   Periwound Temperature warm 07/24/24 1322   Periwound Skin Turgor soft 07/24/24 1322   Edges open 07/24/24 1322   Length (cm) 0 07/24/24 1322   Width (cm) 0 07/24/24 1322   Depth (cm) 0 07/24/24 1322   Wound (cm^2) 0 cm^2 07/24/24 1322   Wound Volume (cm^3) 0 cm^3 07/24/24 1322   Wound healing % 100 07/24/24 1322   Drainage Amount none 07/24/24 1322                           Recent Labs   Lab Test 03/21/23  1025 03/08/22  0626 07/20/21  0614   A1C 6.9* 8.8* 10.8*           Recent Labs   Lab Test 05/22/23  1316 07/03/22  2204 06/16/22  2302   ALBUMIN 3.5 3.8 3.6              No sharp debridement performed today.                  ASSESSMENT:   This is a 83 year old  female with a healed stage III left heel pressure injury, the patient also has lower extremity edema which was also managed during today's clinic visit.          PLAN:   I recommend she continue to keep the area covered with a simple bandage change with bathing for another week or 2 to protect the new fragile epithelium which is covered over the wound and after this no further bandages to be required.    I have encouraged her to continue to keep pressure off of the heel area as much as she can during the day and the night.  On October 9, 2023 she had an ankle-brachial index checked which did not show any evidence of arterial insufficiency.      Separate from the wound care instructions we then discussed management strategies for lower extremity edema.  I explained the keys for managing lower extremity edema are compression and elevation.  I have again explained to the patient today that controlling the edema is probably the most important thing we can do to help heal the wound.  I have specifically recommended that they lay down with their legs above the level of the heart for 30 minutes at least twice a day.  I emphasized that if we can not control the edema we will likely not be able to get this wound to heal.     I have encouraged the patient to continue on their high protein diet to aid in wound healing.   The patient will return to the wound clinic on an as-needed basis if further wounds develop.        30 minutes spent on the date of the encounter doing chart review, history and exam, documentation and further activities per the note, this time excludes any procedure time      Woo Lew MD  07/24/2024   1:36 PM   [facility] Vascular/Wound  [phone]    This note was electronically signed by  Woo Lew MD        Further instructions from your care team         07/24/2024   Tonya Trey   1940    No supplies needed; left heel is healed; will continue with simple bandaid after showering and drying thoroughly.  Please ship supplies to [facility] at [address] [facility] [address]     Dressing changes outside of clinic are being performed by Home Care and Caregiver      [facility] Phone [phone]; Fax [phone]  CHERELLE Osullivan  Dressing changes outside of clinic are being performed by Home Care (2x per week) and PCA (other days)        Plan: 7/24/24  - no need to return here to this clinic unless your wound re opens or restarts to drain  - you are healed; continue with simple bandaid for 1-2 weeks; no longer need the xeroform (yellow cling dressing)  - you continue to be at risk for re injury to that area; healed skin is still not as strong as skin that hasn't been injured  - Avoid pressure to heel - especially when you are moving around with the wheelchair  - Do not shower with dressings on; take them off before showering. Do wound care afterward (including compression)  - Keep blood sugars below 150 and A1C below 7  - See dermatologist for fissures on fingers       Skin care: left heel  - Wash your hands with soap and water before you begin your dressing change and prepare a clean surface for dressings.  - Cleanse with mild unscented soap and water (such as Cetaphil, Cerave or Dove)  - Apply one simple bandaid for 1-2 more weeks (can stop dressing once the 1-2 weeks has passed and you do not notice any drainage)  - Apply thick, high-quality moisturizer to intact skin on both legs (such as CeraVe, Eucerin, Aquaphor or Vanicream)  - Apply compression stocking (OTC 15-20mmHg knee-high) and/or velcro wraps  Change daily     Right leg skin care, daily:  - Cleanse with mild unscented soap and water (such as Cetaphil, Cerave or Dove)  - Apply thick, high-quality  moisturizer to intact skin (such as CeraVe, Eucerin, Aquaphor nor Vanicream)  - Apply compression stocking (OTC 15-20mmHg knee-high)  Wear in the morning til sleep; remove for sleeping        Elevate: your legs above the level of your heart for 30 minutes: 2-3 times each day  Ways to do this:  - Lay on the couch or your bed and prop your legs up on pillows. Pillows should be  under your calves in bed so your feet are floating in the air and no pressure is on the wound.  - Recline back as far as you can go in your recliner and prop your legs on pillows.        Compression:  Compression stockings/velcro wraps can be removed at night and put back on first thing in the morning.  Please remove compression dressing if toes turn blue and/or tingle and can not be  relieved by raising the leg for one hour. If unable to reapply in the morning, keep  compression on until next dressing change.  Walk as much as you can, as you are able.        Low sodium, high protein diet:  A diet high in protein is important for wound healing, we recommend getting up to 90  grams of protein per day.  Taking protein shakes or bars are a good way to get extra protein in your diet.        Main Provider: Woo Lew M.D. July 24, 2024    Call us at [phone] if you have any questions about your wounds, if you have redness or swelling around your wound, have a fever of 101 degrees Fahrenheit or greater or if you have any other problems or concerns. We answer the phone Monday through Friday 8 am to 4 pm, please leave a message as we check the voicemail frequently throughout the day. If you have a concern over the weekend, please leave a message and we will return your call Monday. If the need is urgent, go to the ER or urgent care.    If you had a positive experience please indicate that on your patient satisfaction survey form that [facility] will be sending you.    It was a pleasure meeting with you today.  Thank you for allowing  me and my team the privilege of caring for you today.  YOU are the reason we are here, and I truly hope we provided you with the excellent service you deserve.  Please let us know if there is anything else we can do for you so that we can be sure you are leaving completely satisfied with your care experience.      If you have any billing related questions please call the [facility] office at [phone]. The clinic staff does not handle billing related matters.    If you are scheduled to have a follow up appointment, you will receive a reminder call the day before your visit. On the appointment day please arrive 15 minutes prior to your appointment time. If you are unable to keep that appointment, please call the clinic to cancel or reschedule. If you are more than 10 minutes late or greater for your scheduled appointment time, the clinic policy is that you may be asked to reschedule.        ,

## 2024-08-06 ENCOUNTER — TELEPHONE (OUTPATIENT)
Dept: UROLOGY | Facility: CLINIC | Age: 84
End: 2024-08-06
Payer: MEDICARE

## 2024-08-06 DIAGNOSIS — N39.41 URGE INCONTINENCE OF URINE: Primary | ICD-10-CM

## 2024-08-06 RX ORDER — MIRABEGRON 25 MG/1
25 TABLET, FILM COATED, EXTENDED RELEASE ORAL DAILY
Qty: 90 TABLET | Refills: 3 | Status: SHIPPED | OUTPATIENT
Start: 2024-08-06

## 2024-08-06 NOTE — TELEPHONE ENCOUNTER
Writer called Abbi back at [phone] with [facility], pts home health care RN case manager. Writer LVM with clinic number to call back so writer can speak with RN further about sx pt is having

## 2024-08-06 NOTE — TELEPHONE ENCOUNTER
tried to reach Abbi VILLARREAL at [facility] regarding this patient and her increase urgency to use the bathroom.     Received her VM and left a message to call us back at [phone]      - can see the last visit with Dr. Clifton was a phone call on 5-28-24 where the patient stated her Vesicare and vaginal estrace cream were working well for her urgency.     Will await a call back from Abbi the home care nurse to further discuss symptoms.

## 2024-08-06 NOTE — TELEPHONE ENCOUNTER
"Abbi RN called back and writer spoke with her regarding the patients symptoms.    Abbi stated that when she was out to see the patient Tonya stated that her urgency and incontinence has increased and the Vesicare is no longer working. The patient was on Jardiance and her doctor stopped this 2 weeks ago to see if it would help and it did not.     Writer inquired if the patient was having any other symptoms such as a fever or symptoms of a UTI and Abbi stated \"no\"      Writer let Abbi know that I will send a message to Dr. Clifton and once we hear back we will call her.     Abbi verbalized understanding and all questions answered. Abbi's number is [phone] [facility]                "

## 2024-08-06 NOTE — TELEPHONE ENCOUNTER
[facility] Call Center    Phone Message    May a detailed message be left on voicemail: yes     Reason for Call: Symptoms or Concerns     If patient has red-flag symptoms, warm transfer to triage line    Current symptom or concern: Patients home care nurse is calling stating that patient has been complaining about increased urgency and uses the bathroom but when she stands back up, she needs to go again. This has been going on for two weeks. The primary care doctor stopped the Jardiance medication but that's not helping. Please call the nurse back. Thank you. Did send a secure chat to the care team but was asked to sent a TE    Symptoms have been present for:  2 week(s)        Action Taken: Other: obgyn    Travel Screening: Not Applicable     Date of Service:

## 2024-08-06 NOTE — TELEPHONE ENCOUNTER
[facility] Call Center    Phone Message    May a detailed message be left on voicemail: yes     Reason for Call: Other: home care nurse calling to report some symptoms, incontence is gotten worse     please advise with them    Action Taken: Other: urology    Travel Screening: Not Applicable     Date of Service:

## 2024-08-07 NOTE — TELEPHONE ENCOUNTER
Called Abbi back and notified her that Dr. Clifton had ordered Myrbetriq for the pt to try. Abbi verbalized understanding and will pick it up when available.

## 2024-08-09 ENCOUNTER — OFFICE VISIT (OUTPATIENT)
Dept: AUDIOLOGY | Facility: CLINIC | Age: 84
End: 2024-08-09
Payer: MEDICARE

## 2024-08-09 DIAGNOSIS — H90.3 BILATERAL SENSORINEURAL HEARING LOSS: Primary | ICD-10-CM

## 2024-08-09 PROCEDURE — V5275 EAR IMPRESSION: HCPCS | Mod: LT | Performed by: AUDIOLOGIST

## 2024-08-09 PROCEDURE — V5264 EAR MOLD/INSERT: HCPCS | Mod: GY | Performed by: AUDIOLOGIST

## 2024-08-09 NOTE — PROGRESS NOTES
AUDIOLOGY REPORT    SUBJECTIVE: Jumana Yang, 83 year old year old female, was seen at the Audiology Clinic at the [facility] on 8/09/24 for a hearing aid check. She was accompanied by her son, Hernán. Tonya was offered the CART services, but declined to use those services during today's appointment as she understands speech well when spoken slowly and with visual cues. 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 Today, Tonya reports that the left earmold does not fit well inside the ear and she is concerned that she will lose her hearing aid. She also reports her hearing is fluctuating in the left ear and suspects that her hearing may have worsened. Tonya is interested in hearing aid adjustments.      OBJECTIVE: Visual inspection revealed two small gaps on the bottom of the earmold. A left earmold impression was taken without incident. Otoscopy revealed a clear left canal.    Earmold ordered:    : Threadbox  Material: Silicone  Style: Half shell  Color(s): clear    We discussed the likelihood that the volume may not be able to be increased in the left hearing aid due to the degree of her hearing loss. Due to time constraints today, she will need to return for hearing aid adjustments.       ASSESSMENT: Hearing aid check completed. Earmold ordered and billed today.       PLAN: It is recommended that Tonya return in 3-4 weeks for an earmold fitting and hearing aid adjustments. She would like to return for another appointment to test her hearing. Please call this clinic with any questions regarding today s  appointment.    Goran Bejarano, CCC-A  Clinical Audiologist   [place] #[id number]

## 2024-08-13 ENCOUNTER — TELEPHONE (OUTPATIENT)
Dept: UROLOGY | Facility: CLINIC | Age: 84
End: 2024-08-13
Payer: MEDICARE

## 2024-08-13 NOTE — TELEPHONE ENCOUNTER
"Writer spoke to Abbi ([phone] ) Patients Home Health Nurse who had initially called reporting pts mirabegron (MYRBETRIQ) 25 MG 24 hr tablet script was not seen by pharmacy staff when attempting to . Order was placed on 8/6 and E-Prescribing Status: Receipt confirmed by pharmacy (8/6/2024  4:18 PM CDT). Writer called pharmacy [id number] - [place], [place] - [address] who confirmed they DO have the medication available for pt, \"yes we have the generic prescription available for pt to \". Writer initially called the starred number in pt chart who was pts PCA Markel who stated pts Son Davie is on vacation \"until Thursday\" and pt called Abbi back to inform her of this. Abbi verbalized understanding and had no further questions at this time.   "

## 2024-08-13 NOTE — TELEPHONE ENCOUNTER
[facility] Call Center    Phone Message    May a detailed message be left on voicemail: yes     Reason for Call: Medication Question or concern regarding medication   Prescription Clarification  Name of Medication: mirabegron (MYRBETRIQ) 25 MG 24 hr tablet  Prescribing Provider: Rajwinder Clifton   Pharmacy: [id number] - [place], [place] - [address]   What on the order needs clarification? Per Abbi the pharmacy did not receive the script for mirabegron (MYRBETRIQ) 25 MG 24 hr tablet despite electronic receipt on 8/6. Please resend and call to confirm. Okay to leave a VM on her number. Also, please advise if the patient should continue the estradiol (ESTRACE) 0.1 MG/GM vaginal cream  as it is not working.      Action Taken: Message routed to:  Other: Urology [facility]    Travel Screening: Not Applicable

## 2024-09-04 ENCOUNTER — OFFICE VISIT (OUTPATIENT)
Dept: AUDIOLOGY | Facility: CLINIC | Age: 84
End: 2024-09-04
Payer: MEDICARE

## 2024-09-04 DIAGNOSIS — H90.3 BILATERAL SENSORINEURAL HEARING LOSS: Primary | ICD-10-CM

## 2024-09-04 PROCEDURE — 92592 PR HEARING AID CHECK, MONAURAL: CPT | Performed by: AUDIOLOGIST

## 2024-09-04 NOTE — PROGRESS NOTES
AUDIOLOGY REPORT    SUBJECTIVE: Jumana Yang is a 83 year old female who was seen in the Audiology Clinic at the [facility] on 9/4/2024 for a left earmold fitting and hearing aid adjustments. She was accompanied by her son, Hernán. Tonya was offered the CART services, but declined to use those services during today's appointment as she understands speech well when spoken slowly and with visual cues. 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 Today, Tonya is here to receive a new left earmold.      The doctoral extern, Radha Kohli, observed today's appointment.    OBJECTIVE:   1) The left hearing aid was cleaned. Earmold provides a good fit in the navdeep bowl and the tubing was trimmed to the appropriate length. Tonya reported that speech sounded different with the new earmold, but was unable to provide additional details. Attempted to change the volume on the hearing aids, but the volume was too loud and she did not experience benefit from this change. Adjustments were not saved.    2) Ordering a partner microphone to aid in communication. Patient is very pleased with this accessory. Realistic expectations were discussed.      ASSESSMENT: A follow-up appointment for hearing aids was completed today. Changes to the hearing aid was completed as outlined above. Hearing aid check. Ordering a new earmold with a short canal as it may be hitting the canal and blocking sound. Ordering a remote microphone.     PLAN: Tonya will return in 2-3 weeks to receive the remote microphone and a new left earmold with a short canal. Patient is pleased  with the plan. Please call this clinic with any questions regarding today s appointment.    Goran Bejarano, CCC-A  Clinical Audiologist  [place] #[id number]

## 2024-09-05 ENCOUNTER — TELEPHONE (OUTPATIENT)
Dept: UROLOGY | Facility: CLINIC | Age: 84
End: 2024-09-05
Payer: MEDICARE

## 2024-09-05 DIAGNOSIS — N39.41 URGE INCONTINENCE: Primary | ICD-10-CM

## 2024-09-05 NOTE — TELEPHONE ENCOUNTER
[facility] Call Center    Phone Message    May a detailed message be left on voicemail: yes     Reason for Call: Symptoms or Concerns     If patient has red-flag symptoms, warm transfer to triage line    Current symptom or concern: jacinta Go homecare nurse states pt has vaginal odor     Symptoms have been present for:  2 week(s)    Has patient previously been seen for this? No      Are there any new or worsening symptoms? No    Action Taken: Message routed to:  Other: [facility]    Travel Screening: Not Applicable

## 2024-09-06 NOTE — TELEPHONE ENCOUNTER
Spoke with pt's homecare nurse Abbi about Tonya's symptoms.    For about two weeks, pt has noted an odor from her periarea that she is unable to describe. She is unsure if this is from her urine or her vagina.    No vaginal pain, itching, or abnormal discharge. Pt states that this does not feel like yeast infections she has had in the past. No concern for STI.  No urinary symptoms, but pt does note that her chronic incontinence is not improving on her 25mg of Myrbetriq. Routing to Dr. Clifton for advice on medication.    Attempted to call Tonya to further assess symptoms and schedule a swab appointment with CNM/resident/MD (Abbi is unsure pt could administer the swab herself at a self-swab appt). No answer, LVM asking her to call us back.

## 2024-09-06 NOTE — TELEPHONE ENCOUNTER
Attempted to call Abbi (pt's homecare nurse) back with no answer; LVM with clinic callback number.

## 2024-09-10 RX ORDER — MIRABEGRON 50 MG/1
50 TABLET, EXTENDED RELEASE ORAL DAILY
Qty: 30 TABLET | Refills: 3 | Status: SHIPPED | OUTPATIENT
Start: 2024-09-10

## 2024-09-13 ENCOUNTER — TELEPHONE (OUTPATIENT)
Dept: UROLOGY | Facility: CLINIC | Age: 84
End: 2024-09-13
Payer: MEDICARE

## 2024-09-13 NOTE — TELEPHONE ENCOUNTER
[facility] Call Center    Phone Message    May a detailed message be left on voicemail: yes     Reason for Call: Other: A home care nurse for pt called because pt had not received a call. Contact was attempted but it looks like the number was incorrect. Home nurse verified correct phone number for pt. CHERELLE Dowd asked for TE to be sent and she would contact pt again.     Action Taken: Message routed to:  Other: [facility]    Travel Screening: Not Applicable     Date of Service:

## 2024-09-16 ENCOUNTER — TELEPHONE (OUTPATIENT)
Dept: OBGYN | Facility: CLINIC | Age: 84
End: 2024-09-16
Payer: MEDICARE

## 2024-09-16 NOTE — TELEPHONE ENCOUNTER
Patient called wanting to discuss with and RN why she needs vaginal swab. Before call center could transfer pt to writer, pt hung up. Writer called pt back immediately x2, someone picked up the phone, writer asked for pt and introduced herself and then phone hung up. Happened x2    PAST MEDICAL HISTORY:  Hypertriglyceridemia     Mental retardation     Obesity     Obstructive sleep apnea on CPAP     Schizophrenia

## 2024-09-16 NOTE — TELEPHONE ENCOUNTER
Attempted to call Tonya once more; reached voicemail and left message to call us back ASAP. We need to schedule her for abt with any provider to swab for vaginal infection.

## 2024-09-17 ENCOUNTER — TELEPHONE (OUTPATIENT)
Dept: OBGYN | Facility: CLINIC | Age: 84
End: 2024-09-17
Payer: MEDICARE

## 2024-09-17 DIAGNOSIS — N89.8 VAGINAL ODOR: Primary | ICD-10-CM

## 2024-09-17 NOTE — TELEPHONE ENCOUNTER
Abbi home care RN for patient calling to schedule appt for vaginal swab due to sx reported by home care RN (see previous TE with sx)     Pt scheduled this Thursday with CNM team for them to assist pt with vaginal swab as pt will be unable to do self swab

## 2024-10-01 ENCOUNTER — OFFICE VISIT (OUTPATIENT)
Dept: UROLOGY | Facility: CLINIC | Age: 84
End: 2024-10-01
Attending: OBSTETRICS & GYNECOLOGY
Payer: MEDICARE

## 2024-10-01 VITALS — RESPIRATION RATE: 18 BRPM | DIASTOLIC BLOOD PRESSURE: 77 MMHG | SYSTOLIC BLOOD PRESSURE: 138 MMHG | HEART RATE: 81 BPM

## 2024-10-01 DIAGNOSIS — N39.46 MIXED INCONTINENCE: ICD-10-CM

## 2024-10-01 DIAGNOSIS — N89.8 VAGINAL IRRITATION: Primary | ICD-10-CM

## 2024-10-01 DIAGNOSIS — R30.0 DYSURIA: ICD-10-CM

## 2024-10-01 LAB
BACTERIAL VAGINOSIS VAG-IMP: NEGATIVE
CANDIDA DNA VAG QL NAA+PROBE: NOT DETECTED
CANDIDA GLABRATA / CANDIDA KRUSEI DNA: DETECTED
T VAGINALIS DNA VAG QL NAA+PROBE: NOT DETECTED

## 2024-10-01 PROCEDURE — 87086 URINE CULTURE/COLONY COUNT: CPT | Performed by: OBSTETRICS & GYNECOLOGY

## 2024-10-01 PROCEDURE — 0352U MULTIPLEX VAGINAL PANEL BY PCR: CPT | Performed by: OBSTETRICS & GYNECOLOGY

## 2024-10-01 PROCEDURE — G0463 HOSPITAL OUTPT CLINIC VISIT: HCPCS | Performed by: OBSTETRICS & GYNECOLOGY

## 2024-10-01 PROCEDURE — 99213 OFFICE O/P EST LOW 20 MIN: CPT | Performed by: OBSTETRICS & GYNECOLOGY

## 2024-10-01 ASSESSMENT — PAIN SCALES - GENERAL: PAINLEVEL: MILD PAIN (3)

## 2024-10-01 NOTE — LETTER
10/1/2024       RE: Jumana Yang  [address]  [place] [address]     Dear Colleague,    Thank you for referring your patient, Jumana Yang, to the [facility] [place] [facility]. Please see a copy of my visit note below.    Ms Yang is here to follow up for her urgency urinary incontinence. At her last visit we had discontinued her oxybutinin and started vesicare 5 mg /day. This did not work so I prescribed her myrbetriq.  25 mg/day last month which did not help much so increased dose to 50 mg/day . Today she says that her bladder symptoms of urgency leaks have not improved at all and she wants to know if we can do anything more.     Assesment and plan  Encounter Diagnoses   Name Primary?     Vaginal irritation Yes     Dysuria      Mixed incontinence      Urge predominant  Still really severe with not much improvement on the higher dose of myrbetriq after not responding to two anticholinergics    Discurssed botox/PTNS and Interstim as next options  She prefers to try PTNS but will need to make sure she has a ride. We discussed what this entails including commitment to weekly treatment for 12 weeks.   Will connect her with our urology clinic to schedule  She wants to continue her myrbetrique which is fine for now  Vaginal swab and urine test given some early sensation of burning in the bladder/vagina area.       I spent 20 face-to-face with Jumana Yang (and her care taker) on the date of the encounter in chart review, patient visit, review of tests, documentation and/or discussion with other providers about the issues documented above.             Again, thank you for allowing me to participate in the care of your patient.      Sincerely,    Rajwinder Clifton MD

## 2024-10-01 NOTE — PROGRESS NOTES
Ms Yang is here to follow up for her urgency urinary incontinence. At her last visit we had discontinued her oxybutinin and started vesicare 5 mg /day. This did not work so I prescribed her myrbetriq.  25 mg/day last month which did not help much so increased dose to 50 mg/day . Today she says that her bladder symptoms of urgency leaks have not improved at all and she wants to know if we can do anything more.     Assesment and plan  Encounter Diagnoses   Name Primary?    Vaginal irritation Yes    Dysuria     Mixed incontinence      Urge predominant  Still really severe with not much improvement on the higher dose of myrbetriq after not responding to two anticholinergics    Discurssed botox/PTNS and Interstim as next options  She prefers to try PTNS but will need to make sure she has a ride. We discussed what this entails including commitment to weekly treatment for 12 weeks.   Will connect her with our urology clinic to schedule  She wants to continue her myrbetrique which is fine for now  Vaginal swab and urine test given some early sensation of burning in the bladder/vagina area.       I spent 20 face-to-face with Jumana Yang (and her care taker) on the date of the encounter in chart review, patient visit, review of tests, documentation and/or discussion with other providers about the issues documented above.

## 2024-10-01 NOTE — Clinical Note
Onel Lobo and Angela,  would you kindly assist help this patient to set up PTNS treatments at the [facility] for urge incontinence.   Thank you both so much

## 2024-10-01 NOTE — NURSING NOTE
Bladder frequency  every two hours at night   medication doesn't help with  no holding urine    needs a MVP test today

## 2024-10-02 ENCOUNTER — TELEPHONE (OUTPATIENT)
Dept: OBGYN | Facility: CLINIC | Age: 84
End: 2024-10-02

## 2024-10-02 ENCOUNTER — OFFICE VISIT (OUTPATIENT)
Dept: AUDIOLOGY | Facility: CLINIC | Age: 84
End: 2024-10-02
Payer: MEDICARE

## 2024-10-02 DIAGNOSIS — B37.31 YEAST INFECTION OF THE VAGINA: Primary | ICD-10-CM

## 2024-10-02 DIAGNOSIS — H90.3 SENSORINEURAL HEARING LOSS, BILATERAL: Primary | ICD-10-CM

## 2024-10-02 PROCEDURE — 92592 PR HEARING AID CHECK, MONAURAL: CPT | Performed by: AUDIOLOGIST

## 2024-10-02 PROCEDURE — V5290 ALD TRANSMITTER MICROPHONE: HCPCS | Mod: GY | Performed by: AUDIOLOGIST

## 2024-10-02 RX ORDER — FLUCONAZOLE 150 MG/1
150 TABLET ORAL ONCE
Qty: 1 TABLET | Refills: 0 | Status: SHIPPED | OUTPATIENT
Start: 2024-10-02 | End: 2024-10-02

## 2024-10-02 NOTE — TELEPHONE ENCOUNTER
----- Message from Rajwinder Clifton sent at 10/2/2024  1:05 PM CDT -----  Regarding: yeast infection  Hi team, This patient's labs came back with yeast vaginal infection. I have prescribed a dose of diflucan to her [facility]. Would you kindly let her know. I don't think she has my chart    Rajwinder

## 2024-10-02 NOTE — TELEPHONE ENCOUNTER
"Notified pt of + yeast infection results. Educated pt on medication instructions and recommended to call clinic back if symptoms do not resolve within 3 days. Pt voiced understanding.     States she has \"very\" odorous urine and perineal area. Did not mention burning or discharge.     "

## 2024-10-03 LAB — BACTERIA UR CULT: NORMAL

## 2024-10-03 NOTE — PROGRESS NOTES
AUDIOLOGY REPORT    SUBJECTIVE: Jumana Yang, 84 year old year old female, was seen at the Audiology Clinic at the [facility] on 10/02/24 for a hearing aid check. She was accompanied by her son, Rena Merecdes, language services communication , was present to discuss Tonya's communication needs. Tonya did not want to utilize CART services today. 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 Today, Tonya inquires if she is having her hearing tested because she is have significant difficulty understanding speech. She was going to be fit with a left earmold, but the earmold did not arrive in time for the appointment. Tonya notes that she is under a significant amount of stress due to a recent fall that she had.       OBJECTIVE:  We discussed that her fluctuating hearing may be due to the significant amount of stress that she is experiencing.  She is informed that she is scheduled for a hearing test in November, and the goal for today is to receive the remote microphone. The microphone is paired to her hearing aid and Tonya indicates that the microphone is way too loud. She is shown how to decrease the volume on the microphone, or she could use the button on her hearing aid to change the volume. The button on the patient's hearing aid is currently programmed to change the program (there are none) and to turn the device off/on. It is mutually decided to not make any changes to the button function or the volume. We briefly discussed that Tonya may be able to change the volume or programs on her iPad if her hearing aid is able  to connect. She is encouraged to bring her iPad to the next appointment.     The patient would like to take the remote microphone home and practice using it at home. She will bring it to the next appointment if she would like to return it.     ASSESSMENT: Hearing aid check completed. Changes to hearing aid was completed as outlined above. Monaural hearing aid check and remote microphone.     PLAN: It is recommended that Tonya return on 11/22/24 for a hearing test. She will receive the earmold at that appointment. Please call this clinic with any questions regarding today s appointment.    Goran Bejarano, CCC-A  Clinical Audiologist   [place] #[id number]

## 2024-10-08 NOTE — TELEPHONE ENCOUNTER
FYI - Status Update    Who is Calling: family member, Son Davie    Update:  Please send RX from 12/9/2023 to [facility] pharmacy    [facility]   [address] [place] [address]    Does caller want a call/response back: Yes     Okay to leave a detailed message?: Yes at Other phone number:  [phone]     48 hour holter

## 2024-10-11 ENCOUNTER — TELEPHONE (OUTPATIENT)
Dept: UROLOGY | Facility: CLINIC | Age: 84
End: 2024-10-11
Payer: MEDICARE

## 2024-10-11 NOTE — TELEPHONE ENCOUNTER
Spoke with patient regarding the need to schedule weekly PTNS treatments, she advised she needs to coordinate with her son tri because he is her transportation. Patient stated more than likely will need to be on a Tuesday.

## 2024-11-04 ENCOUNTER — TELEPHONE (OUTPATIENT)
Dept: UROLOGY | Facility: CLINIC | Age: 84
End: 2024-11-04
Payer: MEDICARE

## 2024-11-04 DIAGNOSIS — R39.15 URINARY URGENCY: Primary | ICD-10-CM

## 2024-11-04 NOTE — TELEPHONE ENCOUNTER
Spoke with CHERELLE Go. Pt wants to take Azo to help with her chronic incontinence. No new or worsening urinary symptoms indicative of UTI.    Let Abbi know that there doesn't seem to be any documented benefit to taking Azo without dysuria; plus it can only be used for 2 days max.

## 2024-11-04 NOTE — TELEPHONE ENCOUNTER
[facility] Call Center    Phone Message    May a detailed message be left on voicemail: yes     Reason for Call: Other: Nurse Abbi has called for  patient  wondering if patient can take this Rx :  Nancy , Nurse (Abbi) would like to know if this is ok to take . Patient already has the Rx in hand . Please call Abbi back    Action Taken: Other: [facility]  [facility]    Travel Screening: Not Applicable     Date of Service:

## 2024-11-05 ENCOUNTER — OFFICE VISIT (OUTPATIENT)
Dept: URGENT CARE | Facility: URGENT CARE | Age: 84
End: 2024-11-05
Payer: MEDICARE

## 2024-11-05 VITALS
SYSTOLIC BLOOD PRESSURE: 165 MMHG | RESPIRATION RATE: 16 BRPM | DIASTOLIC BLOOD PRESSURE: 76 MMHG | TEMPERATURE: 97.7 F | HEART RATE: 78 BPM | OXYGEN SATURATION: 94 %

## 2024-11-05 DIAGNOSIS — S81.802A WOUND OF LEFT LOWER EXTREMITY, INITIAL ENCOUNTER: ICD-10-CM

## 2024-11-05 DIAGNOSIS — N32.81 OAB (OVERACTIVE BLADDER): ICD-10-CM

## 2024-11-05 DIAGNOSIS — R30.0 DYSURIA: Primary | ICD-10-CM

## 2024-11-05 DIAGNOSIS — I73.9 PVD (PERIPHERAL VASCULAR DISEASE) (H): ICD-10-CM

## 2024-11-05 LAB
ALBUMIN UR-MCNC: NEGATIVE MG/DL
APPEARANCE UR: CLEAR
BILIRUB UR QL STRIP: NEGATIVE
COLOR UR AUTO: YELLOW
GLUCOSE UR STRIP-MCNC: NEGATIVE MG/DL
HGB UR QL STRIP: NEGATIVE
KETONES UR STRIP-MCNC: NEGATIVE MG/DL
LEUKOCYTE ESTERASE UR QL STRIP: NEGATIVE
NITRATE UR QL: NEGATIVE
PH UR STRIP: 6.5 [PH] (ref 5–7)
SP GR UR STRIP: 1.01 (ref 1–1.03)
UROBILINOGEN UR STRIP-MCNC: NORMAL MG/DL

## 2024-11-05 PROCEDURE — 99214 OFFICE O/P EST MOD 30 MIN: CPT | Performed by: NURSE PRACTITIONER

## 2024-11-05 PROCEDURE — 81003 URINALYSIS AUTO W/O SCOPE: CPT | Performed by: NURSE PRACTITIONER

## 2024-11-05 NOTE — TELEPHONE ENCOUNTER
Spoke with CHERELLE Go again. Pt is reporting an odor to her urine as well as her urinary urgency. Ordered UA with reflex to culture, which Abbi will collect.

## 2024-11-06 NOTE — PROGRESS NOTES
Chief Complaint   Patient presents with    UTI     Sx 1 Week - Urgency and Odor     Wound Check     Left Leg Calf Wound Infected, Red and Swollen     SUBJECTIVE:  Jumana Ynag is a 84 year old female who  presents today with left anterior shin wound check and urinary urgency malodor over the past week.  Her home health nurse advised for her to be seen.  She has baseline stasis dermatitis PVD with discoloration to bilateral lower extremities.  She often bumps her legs and is prone to wounds with poor healing.  She declines severe pain increased redness warmth pus fevers red streaking nausea vomiting flank pain vaginal discharge.  Patient has overactive bladder followed by urology and is supposed to have a 12-week treatment done soon.    SpO2 Readings from Last 6 Encounters:   11/05/24 94%   02/08/24 95%   01/24/24 91%   12/09/23 100%   10/06/23 93%   09/20/23 98%     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    Hepatic abscess     Hyperlipidemia     Hypertension     Hypothyroidism     Irritable bowel syndrome     Meniere disease     Neuropathy     Noninfectious ileitis     Peritoneal abscess (H)     Spinal stenosis of lumbar region     Steroid long-term use     Vaginitis, atrophic, postmenopausal     Vitamin D deficiency      Current Outpatient Medications   Medication Sig Dispense Refill    acetaminophen (TYLENOL) 325 MG tablet 325 mg every 6 hours as needed      benzonatate (TESSALON) 100 MG capsule TAKE 1 CAP BY MOUTH THREE TIMES DAILY AS NEEDED (Patient not taking: Reported on 10/1/2024)      blood glucose (ACCU-CHEK FASTCLIX) lancing device FOR  TESTING ONCE DAILY. DX  E11.9 TYPE 2 DIABETES      blood glucose (CONTOUR TEST) test strip TESTING EVERY DAY DX  E11.9      busPIRone (BUSPAR) 7.5 MG tablet Take 7.5 mg by mouth 2 times daily      CONTOUR NEXT TEST test strip TESTING EVERY DAY DX  E11.9      diazepam (VALIUM) 2 MG tablet Take 1 tablet (2 mg) by mouth every 6 hours as needed for anxiety 10 tablet 0    DULoxetine (CYMBALTA) 60 MG capsule Take 60 mg by mouth every morning      estradiol (ESTRACE) 0.1 MG/GM vaginal cream Place 1 g vaginally three times a week 42.5 g 3    glipiZIDE (GLUCOTROL) 10 MG tablet Take 10 mg by mouth daily (with breakfast)      glipiZIDE (GLUCOTROL) 5 MG tablet Take 5 mg by mouth daily (with dinner)      hydrocortisone (CORTAID) 1 % external cream Apply topically 2 times daily as needed (hemorrhoid irritation) To hemorrhoids.      hydrOXYzine (ATARAX) 25 MG tablet Take 25 mg by mouth 3 times daily as needed for anxiety      lamoTRIgine (LAMICTAL) 100 MG tablet Take 100 mg by mouth 2 times daily      melatonin 3 MG tablet Take 1 tablet (3 mg) by mouth nightly as needed for sleep      metFORMIN (GLUCOPHAGE XR) 500 MG 24 hr tablet Take 500 mg by mouth daily (with breakfast)      miconazole (MICATIN) 2 % external powder Apply topically 2 times daily as needed for other (candidiasis/intertrigo) To skin folds      mirabegron (MYRBETRIQ) 25 MG 24 hr tablet Take 1 tablet (25 mg) by mouth daily (Patient not taking: Reported on 10/1/2024) 90 tablet 3    mirabegron (MYRBETRIQ) 50 MG 24 hr tablet Take 1 tablet (50 mg) by mouth daily. 30 tablet 3    omeprazole (PRILOSEC) 20 MG DR capsule Take 20 mg by mouth daily      oxyCODONE-acetaminophen (PERCOCET) 5-325 MG tablet Take 1 tablet by mouth 4 times daily as needed for pain (Patient not taking: Reported on 10/1/2024) 5 tablet 0    phenazopyridine (PYRIDIUM) 200 MG tablet Take 1 tablet (200 mg) by mouth 3 times daily as needed for irritation 21 tablet 0    polyethylene glycol (MIRALAX) 17  GM/Dose powder Take 17 g by mouth daily      predniSONE (DELTASONE) 1 MG tablet Take 1 mg by mouth every morning (In addition to the 5 mg dose; 1 mg + 5 mg = 6 mg)      predniSONE (DELTASONE) 5 MG tablet Take 5 mg by mouth every morning (In addition to the 1 mg dose; 5 mg + 1 mg = 6 mg)      pregabalin (LYRICA) 100 MG capsule Take 100 mg by mouth 2 times daily      solifenacin (VESICARE) 5 MG tablet Take 1 tablet (5 mg) by mouth daily 90 tablet 3    triamterene-HCTZ (DYAZIDE) 37.5-25 MG capsule Take 1 capsule by mouth every morning Hold for SBP<110       Social History     Tobacco Use    Smoking status: Former     Current packs/day: 0.00     Types: Cigarettes     Quit date: 11/15/1987     Years since quittin.0    Smokeless tobacco: Former   Substance Use Topics    Alcohol use: Not Currently     Alcohol/week: 0.0 standard drinks of alcohol     Comment: AA -dober since      Allergies   Allergen Reactions    Propofol Nausea and Vomiting    Shellfish Allergy      Other reaction(s): Dizziness    Capsaicin Rash and Blisters    Capsaicin Blisters and Rash    Tegaderm Transparent Dressing (Informational Only) Itching and Rash       Review of Systems  All systems negative except for those listed above in HPI.    OBJECTIVE:  BP (!) 165/76   Pulse 78   Temp 97.7  F (36.5  C)   Resp 16   LMP  (LMP Unknown)   SpO2 94%   Physical Exam  Vitals reviewed.   Constitutional:       General: She is not in acute distress.     Appearance: Normal appearance. She is not ill-appearing, toxic-appearing or diaphoretic.   HENT:      Head: Normocephalic and atraumatic.   Cardiovascular:      Rate and Rhythm: Normal rate.      Pulses: Normal pulses.   Pulmonary:      Effort: Pulmonary effort is normal.   Abdominal:      Tenderness: There is no right CVA tenderness or left CVA tenderness.   Musculoskeletal:         General: Swelling, tenderness (mildly tender leg wound) and signs of injury present. Normal range of motion.       Right lower leg: Edema present.      Left lower leg: Edema present.      Comments: 2 cm open wound to anterior shin with granulation tissue and blanching surrounding area.   Skin:     General: Skin is warm and dry.      Findings: Erythema and lesion present. No rash.   Neurological:      General: No focal deficit present.      Mental Status: She is alert and oriented to person, place, and time.      Sensory: Sensory deficit present.      Motor: Weakness present.      Gait: Gait abnormal (wheelchair bound).   Psychiatric:         Mood and Affect: Mood normal.         Behavior: Behavior normal.       Results for orders placed or performed in visit on 11/05/24   UA Macroscopic with reflex to Microscopic and Culture - Clinic Collect     Status: Normal    Specimen: Urine, Clean Catch   Result Value Ref Range    Color Urine Yellow Colorless, Straw, Light Yellow, Yellow    Appearance Urine Clear Clear    Glucose Urine Negative Negative mg/dL    Bilirubin Urine Negative Negative    Ketones Urine Negative Negative mg/dL    Specific Gravity Urine 1.015 1.003 - 1.035    Blood Urine Negative Negative    pH Urine 6.5 5.0 - 7.0    Protein Albumin Urine Negative Negative mg/dL    Urobilinogen Urine Normal Normal, 2.0 mg/dL    Nitrite Urine Negative Negative    Leukocyte Esterase Urine Negative Negative    Narrative    Microscopic not indicated     ASSESSMENT:    ICD-10-CM    1. Dysuria  R30.0 UA Macroscopic with reflex to Microscopic and Culture - Clinic Collect      2. Wound of left lower extremity, initial encounter  S81.802A       3. PVD (peripheral vascular disease) (H)  I73.9       4. OAB (overactive bladder)  N32.81         PLAN:     Urinalysis is negative for UTI  Keep urology follow-up for overactive bladder treatments  Reevaluation if new or worsening symptoms of urogenital pain fever vomiting back pain    Baseline lower extremity stasis dermatitis PVD with recurrent wounds and poor healing  This wound does not appear to  be infected and the tissue is blanching  Proper wound care advised and Mepilex given in clinic  Should have close follow-up with PCP and/or wound care nursing team  Reevaluation if warmth pus increased pain red streaking fever nausea vomiting    Follow up with primary care provider with any problems, questions or concerns or if symptoms worsen or fail to improve. Patient agreed to plan and verbalized understanding.    ESCOBAR Liang-BC  [facility] [place]

## 2024-11-07 ENCOUNTER — TELEPHONE (OUTPATIENT)
Dept: OBGYN | Facility: CLINIC | Age: 84
End: 2024-11-07
Payer: MEDICARE

## 2024-11-07 NOTE — TELEPHONE ENCOUNTER
Homecare nurse called with an update:     Tonya was recommended to have a UA, which she had done at an urgent care clinic yesterday.  It was negative.     Tonya is also wondering how to schedule her weekly PTNS treatments.  Sent secure message to Rosita Wilkinson to schedule these with her.

## 2024-11-09 NOTE — PATIENT INSTRUCTIONS
Urinalysis is negative for UTI  Keep urology follow-up for overactive bladder treatments  Reevaluation if new or worsening symptoms of urogenital pain fever vomiting back pain    Baseline lower extremity stasis dermatitis PVD with recurrent wounds and poor healing  This wound does not appear to be infected and the tissue is blanching  Proper wound care advised and Mepilex given in clinic  Should have close follow-up with PCP and/or wound care nursing team  Reevaluation if warmth pus increased pain red streaking fever nausea vomiting

## 2024-11-12 ENCOUNTER — TELEPHONE (OUTPATIENT)
Dept: WOUND CARE | Facility: CLINIC | Age: 84
End: 2024-11-12
Payer: MEDICARE

## 2024-11-12 NOTE — TELEPHONE ENCOUNTER
Fax received from patients PCP office requesting for the patient to be seen at [facility].     Please call the patient and schedule with Dr. Lew, Destinee Armas NP, or Erlinda Ramos PA-C at the next available appointment.    Routing to  Wound Healing Scheduling.

## 2024-11-20 ENCOUNTER — HOSPITAL ENCOUNTER (OUTPATIENT)
Dept: WOUND CARE | Facility: CLINIC | Age: 84
Discharge: HOME | End: 2024-11-20
Attending: FAMILY MEDICINE | Admitting: FAMILY MEDICINE
Payer: MEDICARE

## 2024-11-20 VITALS — TEMPERATURE: 97 F | HEART RATE: 82 BPM | DIASTOLIC BLOOD PRESSURE: 72 MMHG | SYSTOLIC BLOOD PRESSURE: 139 MMHG

## 2024-11-20 DIAGNOSIS — R60.0 BILATERAL LEG EDEMA: Primary | ICD-10-CM

## 2024-11-20 DIAGNOSIS — S81.802D WOUND OF LEFT LOWER EXTREMITY, SUBSEQUENT ENCOUNTER: ICD-10-CM

## 2024-11-20 PROBLEM — S81.802A LEG WOUND, LEFT: Status: ACTIVE | Noted: 2024-11-20

## 2024-11-20 PROBLEM — L89.623 STAGE III PRESSURE ULCER OF LEFT HEEL (H): Status: RESOLVED | Noted: 2023-09-27 | Resolved: 2024-11-20

## 2024-11-20 PROCEDURE — G0463 HOSPITAL OUTPT CLINIC VISIT: HCPCS

## 2024-11-20 NOTE — PROGRESS NOTES
Patient arrived for wound care visit. Certified Wound Care Nurse time spent evaluating patient record, and completed a full evaluation; provided recommendation based on treatment plan. Reviewed discharge instructions, patient education, and discussed plan of care with appropriate medical team staff members and patient and/or family members.    Sandhya Arias RN

## 2024-11-20 NOTE — DISCHARGE INSTRUCTIONS
11/20/2024   Tonya Yang   1940          [facility]  [address] [facility] [address], [facility] [address]  Appointment Phone [phone]     Your wound is healed!! We will cover your wound with a silicone foam bandage today. We recommend covering for another 1-2 weeks as your skin strengthens.    Wound Clinic follow up in the future if there are any wound concerns    Call us at [phone] if you have any questions about your wounds, have redness or swelling around your wound, have a fever of 101 or greater or if you have any other problems or concerns. We answer the phone Monday through Friday 8 am to 4 pm, please leave a message as we check the voicemail frequently throughout the day.     Continue to elevate your legs above the level of your heart for 30 minutes: approximately 2-3 times each day   Ways to do this:   - Lay on the couch or your bed and prop your legs up on pillows   - Recline back as far as you can go in your recliner and prop your legs on pillows.   Doing these things will help reduce the edema in your legs.     Main Provider: Woo Lew M.D. November 20, 2024    Call us at [phone] if you have any questions about your wounds, if you have redness or swelling around your wound, have a fever of 101 degrees Fahrenheit or greater or if you have any other problems or concerns. We answer the phone Monday through Friday 8 am to 4 pm, please leave a message as we check the voicemail frequently throughout the day. If you have a concern over the weekend, please leave a message and we will return your call Monday. If the need is urgent, go to the ER or urgent care.    If you had a positive experience please indicate that on your patient satisfaction survey form that [facility] will be sending you.    It was a pleasure meeting with you today.  Thank you for allowing me and my team the privilege of caring for you today.  YOU are the reason we are here,  and I truly hope we provided you with the excellent service you deserve.  Please let us know if there is anything else we can do for you so that we can be sure you are leaving completely satisfied with your care experience.      If you have any billing related questions please call the [facility] Business office at [phone]. The clinic staff does not handle billing related matters.    If you are scheduled to have a follow up appointment, you will receive a reminder call the day before your visit. On the appointment day please arrive 15 minutes prior to your appointment time. If you are unable to keep that appointment, please call the clinic to cancel or reschedule. If you are more than 10 minutes late or greater for your scheduled appointment time, the clinic policy is that you may be asked to reschedule.

## 2024-11-20 NOTE — PROGRESS NOTES
Wound Clinic Note         Visit date: 11/20/2024       Cheif Complaint:     Jumana Yang is a 84 year old   female had concerns including WOUND CARE.  The patient has lower extremity edema and a left leg wound.         HISTORY OF PRESENT ILLNESS:    Jumana Yang reports the left leg wound has been present since October 2024.  The wound began due to the area being bumped.       I last saw this patient in the wound clinic on July 24, 2024 at which time her left heel wound was healed.  She reports about a month ago, in October 2024 she bumped her left leg causing a wound to develop.  She has just been applying a Band-Aid to the area along with her compression stockings.  There has been no drainage from the area for the last few days.        The pateint denies fevers or chills.  They report the pain from the wound has been 0/10 and has remained about the same recently.      The patient reports they only occasionally lay down to elevate her legs above the level of their heart, but not twice a day. She does sleep flat laying on a couch.    Today the patient reports maintaining a high protein diet, but has not been taking protein supplements lately.        The patient denies a history of smoking or chronic steroid use.  The patient confirms having diabetes and reports the blood sugars are well controlled.         The patient has not had any symptoms of infection relating to the wound recently and is not currently on antibiotics.       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       Surgical Hx:   Past Surgical History:   Procedure Laterality Date    CATARACT IOL, RT/LT Left 7/2013    FOOT SURGERY      toe    GI SURGERY      IR LUMBAR/SACRAL TRANSFOR INJ BILATERAL Bilateral 3/11/2022    LAPAROSCOPIC HEPATECTOMY PARTIAL  11/4/2013    Procedure: LAPAROSCOPIC HEPATECTOMY PARTIAL;  Laparoscopic Debridement of Liver Abcess;  Surgeon: Sepideh Green MD;  Location: [facility] OR    ORTHOPEDIC SURGERY      PICC INSERTION  8/28/2013    5fr DL Power PICC, 41cm (1cm external length) in the R lateral brachial vein with tip in the SVC RA junction.    RECTAL SURGERY      1970s    RELEASE CARPAL TUNNEL Left 3/21/2023    Procedure: LEFT OPEN CARPAL TUNNEL RELEASE. LEFT RING FINGER A1 PULLEY RELEASE;  Surgeon: Claire Hamilton MD;  Location: [facility] OR    VASCULAR SURGERY            Allergies:    Allergies   Allergen Reactions    Propofol Nausea and Vomiting    Shellfish Allergy      Other reaction(s): Dizziness    Capsaicin Rash and Blisters    Capsaicin Blisters and Rash    Tegaderm Transparent Dressing (Informational Only) Itching and Rash              Medication History:    Current Outpatient Medications   Medication Sig Dispense Refill    acetaminophen (TYLENOL) 325 MG tablet 325 mg every 6 hours as needed      benzonatate (TESSALON) 100 MG capsule TAKE 1 CAP BY MOUTH THREE TIMES DAILY AS NEEDED (Patient not taking: Reported on 10/1/2024)      blood glucose (ACCU-CHEK FASTCLIX) lancing device FOR TESTING ONCE DAILY. DX  E11.9 TYPE 2 DIABETES      blood glucose (CONTOUR TEST) test strip TESTING EVERY DAY  DX  E11.9      busPIRone (BUSPAR) 7.5 MG tablet Take 7.5 mg by mouth 2 times daily      CONTOUR NEXT TEST test strip TESTING EVERY DAY DX  E11.9      diazepam (VALIUM) 2 MG tablet Take 1 tablet (2 mg) by mouth every 6 hours as needed for anxiety 10 tablet 0    DULoxetine (CYMBALTA) 60 MG capsule Take 60 mg by mouth every morning      estradiol (ESTRACE) 0.1 MG/GM vaginal cream Place 1 g vaginally three times a week 42.5 g 3    glipiZIDE (GLUCOTROL) 10 MG tablet Take 10 mg by mouth daily (with breakfast)      glipiZIDE (GLUCOTROL) 5 MG tablet Take 5 mg by mouth daily (with dinner)      hydrocortisone (CORTAID) 1 % external cream Apply topically 2 times daily as needed (hemorrhoid irritation) To hemorrhoids.      hydrOXYzine (ATARAX) 25 MG tablet Take 25 mg by mouth 3 times daily as needed for anxiety      lamoTRIgine (LAMICTAL) 100 MG tablet Take 100 mg by mouth 2 times daily      melatonin 3 MG tablet Take 1 tablet (3 mg) by mouth nightly as needed for sleep      metFORMIN (GLUCOPHAGE XR) 500 MG 24 hr tablet Take 500 mg by mouth daily (with breakfast)      miconazole (MICATIN) 2 % external powder Apply topically 2 times daily as needed for other (candidiasis/intertrigo) To skin folds      mirabegron (MYRBETRIQ) 25 MG 24 hr tablet Take 1 tablet (25 mg) by mouth daily (Patient not taking: Reported on 10/1/2024) 90 tablet 3    mirabegron (MYRBETRIQ) 50 MG 24 hr tablet Take 1 tablet (50 mg) by mouth daily. 30 tablet 3    omeprazole (PRILOSEC) 20 MG DR capsule Take 20 mg by mouth daily      oxyCODONE-acetaminophen (PERCOCET) 5-325 MG tablet Take 1 tablet by mouth 4 times daily as needed for pain (Patient not taking: Reported on 10/1/2024) 5 tablet 0    phenazopyridine (PYRIDIUM) 200 MG tablet Take 1 tablet (200 mg) by mouth 3 times daily as needed for irritation 21 tablet 0    polyethylene glycol (MIRALAX) 17 GM/Dose powder Take 17 g by mouth daily      predniSONE (DELTASONE) 1 MG tablet Take 1 mg by mouth every morning  (In addition to the 5 mg dose; 1 mg + 5 mg = 6 mg)      predniSONE (DELTASONE) 5 MG tablet Take 5 mg by mouth every morning (In addition to the 1 mg dose; 5 mg + 1 mg = 6 mg)      pregabalin (LYRICA) 100 MG capsule Take 100 mg by mouth 2 times daily      solifenacin (VESICARE) 5 MG tablet Take 1 tablet (5 mg) by mouth daily 90 tablet 3    triamterene-HCTZ (DYAZIDE) 37.5-25 MG capsule Take 1 capsule by mouth every morning Hold for SBP<110       No current facility-administered medications for this encounter.         Tobacco History:  reports that she quit smoking about 37 years ago. Her smoking use included cigarettes. She has quit using smokeless tobacco.       REVIEW OF SYMPTOMS:   The review of systems was negative except as noted in the HPI.           PHYSICAL EXAMINATION:     /72 (BP Location: Left arm, Patient Position: Sitting, Cuff Size: Adult Regular)   Pulse 82   Temp 97  F (36.1  C) (Temporal)   LMP  (LMP Unknown)            GENERAL: The patient overall appears well and is no acute distress.   HEAD: normocephalic   EYES: Sclera and conjunctiva clear   NECK: no obvious masses   LUNGS: breathing is unlabored.   EXTREMITIES: No clubbing, cyanosis or edema   SKIN: No rashes or other abnormalities except as noted under the Wound section below.   NEUROLOGICAL: normal motor and sensory function   EDEMA: Moderate  and Lymphedema       WOUND: The new left anterior shin wound is newly healed with a fragile thin layer of epithelium that is covered over the wound.  There is no signs of infection here.      Also see below for wound details:       Circumferential volume measures:             No data to display                Ulceration(s)/Wound(s):   Please see the media tab under the chart review for pictures of the wounds.  Nursing staff removed dressings and cleansed wound.                               Recent Labs   Lab Test 03/21/23  1025 03/08/22  0626 07/20/21  0614   A1C 6.9* 8.8* 10.8*          Recent  Labs   Lab Test 05/22/23  1316 07/03/22  2204 06/16/22  2302   ALBUMIN 3.5 3.8 3.6              No sharp debridement performed today.                  ASSESSMENT:   This is a 84 year old  female with a healed left leg wound, the patient also has lower extremity edema which was also managed during today's clinic visit.          PLAN:   I recommend that she continue to keep the newly healed left leg wound with a simple bandage changed with bathing along with her compression stocking for another week or 2 to allow the new skin which is covered over the wound to thicken up then no further bandages be required.  She does need to continue to wear her compression stockings every day for long-term control of her swelling.    On October 9, 2023 she had an ankle-brachial index checked which did not show any evidence of arterial insufficiency.      Separate from the wound care instructions we then discussed management strategies for lower extremity edema.  I explained the keys for managing lower extremity edema are compression and elevation.  I have again explained to the patient today that controlling the edema is probably the most important thing we can do to help heal the wound.  I have specifically recommended that they lay down with their legs above the level of the heart for 30 minutes at least twice a day.  I emphasized that if we can not control the edema we will likely not be able to get this wound to heal.     I have encouraged the patient to continue on their high protein diet to aid in wound healing.   The patient will return to the wound clinic on an as-needed basis if further wounds develop.        30 minutes spent on the date of the encounter doing chart review, history and exam, documentation and further activities per the note, this time excludes any procedure time      Woo Lew MD  11/20/2024   3:07 PM   [facility]/Wound  [phone]    This note was electronically signed by  Woo Lew MD        Further instructions from your care team         11/20/2024   Tonya Yang   1940          [facility]  [address] [facility] [address], [facility] [address]  Appointment Phone [phone]     Your wound is healed!! We will cover your wound with a silicone foam bandage today. We recommend covering for another 1-2 weeks as your skin strengthens.    Wound Clinic follow up in the future if there are any wound concerns    Call us at [phone] if you have any questions about your wounds, have redness or swelling around your wound, have a fever of 101 or greater or if you have any other problems or concerns. We answer the phone Monday through Friday 8 am to 4 pm, please leave a message as we check the voicemail frequently throughout the day.     Continue to elevate your legs above the level of your heart for 30 minutes: approximately 2-3 times each day   Ways to do this:   - Lay on the couch or your bed and prop your legs up on pillows   - Recline back as far as you can go in your recliner and prop your legs on pillows.   Doing these things will help reduce the edema in your legs.     Main Provider: Woo Lew M.D. November 20, 2024    Call us at [phone] if you have any questions about your wounds, if you have redness or swelling around your wound, have a fever of 101 degrees Fahrenheit or greater or if you have any other problems or concerns. We answer the phone Monday through Friday 8 am to 4 pm, please leave a message as we check the voicemail frequently throughout the day. If you have a concern over the weekend, please leave a message and we will return your call Monday. If the need is urgent, go to the ER or urgent care.    If you had a positive experience please indicate that on your patient satisfaction survey form that [facility] will be sending you.    It was a pleasure meeting with you today.  Thank you for allowing me and  my team the privilege of caring for you today.  YOU are the reason we are here, and I truly hope we provided you with the excellent service you deserve.  Please let us know if there is anything else we can do for you so that we can be sure you are leaving completely satisfied with your care experience.      If you have any billing related questions please call the [facility] Business office at [phone]. The clinic staff does not handle billing related matters.    If you are scheduled to have a follow up appointment, you will receive a reminder call the day before your visit. On the appointment day please arrive 15 minutes prior to your appointment time. If you are unable to keep that appointment, please call the clinic to cancel or reschedule. If you are more than 10 minutes late or greater for your scheduled appointment time, the clinic policy is that you may be asked to reschedule.        ,

## 2024-11-27 NOTE — DISCHARGE INSTRUCTIONS
Jumana WINTERS Trey      1940    A DME order for supplies has been placed to [facility]. If there are any issues with your order including not receiving the order please call [facility] at [phone] option 1. They can also provide a tracking number for you if you had supplies shipped to you.    Dressing changes outside of clinic are being performed by Patient and PCA    You have been referred to [facility] for JOAQUIN testing. Please call [phone] to schedule.      Wound Dressing Change: left heel and right anterior lower leg   - Wash your hands with soap and water before you begin your dressing change and prepare a clean surface for dressings.  -Cleanse with mild unscented soap and water (such as Cetaphil, Cerave or Dove)   -Apply 1 4x4 mepilex to heel and 1 4x4 mepilex to right anterior lower leg  -pull up navy stripe edemawear from toes to behind the knee  -Change three times per week   -bandages can not get wet (should not shower with them on)so take them off before you shower. You will run out of bandages as insurance only covers so many per month if you change them more than three times per week.      Elevation:    It is recommended that you elevate your legs above the level of your heart for 30 minutes: approximately 2-3 times each day     Ways to do this:   - Lay on the couch or your bed and prop your legs up on pillows. Pillows should be under your calves in bed so your feet are floating in the air and no pressure is on the wound.   - Recline back as far as you can go in your recliner and prop your legs on pillows.     Doing these things will help reduce the edema in your legs.      Then apply Navy Stripe EdemaWear from toes to knee. EdemaWear should be worn 24/7 unless bathing/showering or changing the dressing. You will wash and reuse the EdemaWear. DO NOT CUT THE EDEMAWEAR. IF IT IS TOO LONG THEN CUFF THE EDEMAWEAR (the EdemaWear can shrink length wise with  washing).    A diet high in protein is important for wound healing, we recommend getting 90 grams of protein per day. Taking protein shakes or bars are a good way to get extra protein in your diet.     Good sources of protein:  Pork 26g per 3 oz  Whey protein powder - 24g per scoop (on average)  Greek yogurt - 23g per 8oz   Chicken or Turkey - 23g per 3oz  Fish - 20-25g per 3oz  Beef - 18-23g per 3oz  Navy beans - 20g per cup  Cottage cheese - 14g per 1/2 cup   Lentils - 13g per 1/4 cup  Beef jerky 13g per 1oz  2% milk - 8g per cup  Peanut butter - 8g per 2 tablespoons  Eggs - 6g per egg  Mixed nuts - 6g per 2oz     Woo Lew M.D. September 27, 2023    Call us at [phone] if you have any questions about your wounds, have redness or swelling around your wound, have a fever of 101 degrees Fahrenheit or greater or if you have any other problems or concerns. We answer the phone Monday through Friday 8 am to 4 pm, please leave a message as we check the voicemail frequently throughout the day.     If you had a positive experience please indicate that on your patient satisfaction survey form that [facility] will be sending you.    It was a pleasure meeting with you today.  Thank you for allowing me and my team the privilege of caring for you today.  YOU are the reason we are here, and I truly hope we provided you with the excellent service you deserve.  Please let us know if there is anything else we can do for you so that we can be sure you are leaving completely satisfied with your care experience.      If you have any billing related questions please call the [facility] Business office at [phone]. The clinic staff does not handle billing related matters.    If you are scheduled to have a follow up appointment, you will receive a reminder call the day before your visit. On the appointment day please arrive 15 minutes prior to your appointment time. If you are unable to keep that appointment,  please call the clinic to cancel or reschedule. If you are more than 10 minutes late or greater for your scheduled appointment time, the clinic policy is that you may be asked to reschedule.     no

## 2024-12-03 ENCOUNTER — TELEPHONE (OUTPATIENT)
Dept: UROLOGY | Facility: CLINIC | Age: 84
End: 2024-12-03

## 2024-12-03 NOTE — TELEPHONE ENCOUNTER
[facility] Call Center    Phone Message    May a detailed message be left on voicemail: yes     Reason for Call: Other: Abbi Rn called to reschedule pt's nurse appointment on the 17th  of Dec. Writer was not able to due no availabilities. Abbi wants us to call son for rescheduling. Please call son Davie for further discussion. Thanks.     Action Taken: Message routed to:  [facility] UROLOGY    Travel Screening: Not Applicable     Date of Service:

## 2024-12-04 NOTE — TELEPHONE ENCOUNTER
Left detailed voicemail instructing patients son to call me back directly for scheduling. [phone]

## 2024-12-20 DIAGNOSIS — N39.41 URGE INCONTINENCE: ICD-10-CM

## 2024-12-28 NOTE — TELEPHONE ENCOUNTER
"mirabegron (MYRBETRIQ) 50 MG 24 hr tablet   Disp-30 tablet, R-3   Start: 09/10/2024     10/1/2024  [facility]    Rajwinder Clifton MD  OB/Gyn    Routed because:  per note \"   increased dose to 50 mg/day . Today she says that her bladder symptoms of urgency leaks have not improved at all .. Rf?    "

## 2024-12-30 RX ORDER — MIRABEGRON 50 MG/1
50 TABLET, FILM COATED, EXTENDED RELEASE ORAL DAILY
Qty: 90 TABLET | Refills: 2 | Status: SHIPPED | OUTPATIENT
Start: 2024-12-30

## 2024-12-31 ENCOUNTER — OFFICE VISIT (OUTPATIENT)
Dept: INTERPRETER SERVICES | Facility: CLINIC | Age: 84
End: 2024-12-31
Payer: MEDICARE

## 2024-12-31 DIAGNOSIS — N39.41 URGENCY INCONTINENCE: Primary | ICD-10-CM

## 2024-12-31 NOTE — PROGRESS NOTES
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 hydrochlorothiazide    GERD (gastroesophageal reflux disease)    CKD (chronic kidney disease) stage 3, GFR 30-59 ml/min (H)    Iron deficiency anemia due to chronic blood loss    Deafness    Abscess,& FB in liver in 4-10 & 4-11 w recurrent pain in RUQ  in 7-13 s/po lap I&D and unroofing sans finding of an FB  in 11-13     Elevated liver enzymes since 12-13 liver abscess I&D    Temporomandibular joint disorder    Bipolar disorder, in full remission, most recent episode depressed (H)    Diarrhea r/o exacerbated by metformin -since 30's    Baker's cyst, left    Lower urinary tract infectious disease    Left-sided low back pain with left-sided sciatica since 1993 w reoccurrence 6-15     Meniere's disease (cochlear hydrops), LT    Steroid dependent for cochlear hydrops  since 1985     Bilateral back pain    Primary insomnia    Mixed hyperlipidemia    Localized edema-L>R lat malleolus    Microalbuminuria    Leukocytosis w Lt shift     Long-term (current) use of anticoagulants [Z79.01]    Essential hypertension    Ankle edema    Sensorineural hearing loss, bilateral    Chronic pain syndrome-issue of broken controlled sub agreement     Bilateral leg edema worsening w her increasing inactivity     Muscle weakness (generalized) worse since 11-16 w drowsiness    Type 2 diabetes mellitus with diabetic nephropathy, without long-term current use of insulin (H)    Class 2 obesity due to excess calories with serious comorbidity and body mass index (BMI) of 37.0 to 37.9 in adult    Bilateral deafness    Confusion    Hyperglycemia    Acute renal failure, unspecified acute renal failure type (H)    Pneumonia of right lower lobe due to infectious organism    Severe sepsis (H)    Type  Last CPE: 6/6/2023 w/ Dr. Pepper   NOV: 7/24/2024 w/ Dr. Pepper  Requesting:      Disp Refills Start End    pantoprazole (Protonix) 40 MG tablet 90 tablet 0 5/2/2024 --    Sig - Route: Take 1 tablet by mouth daily. - Oral      Medication resent as 30 day rx per pt request- should last until NOV.    2 diabetes mellitus with hyperglycemia (H)    Glycosuria    Paravertebral mass    Abnormal finding on CT scan    Bilateral hearing loss, unspecified hearing loss type    Spasm of back muscles    Neural foraminal stenosis of cervical spine    Multilevel spondylosis    Anxiety and depression    Slow transit constipation    Weakness    Fall, initial encounter    Hypokalemia    Contusion of coccyx, initial encounter    Claudication of both lower extremities (H)    Type 2 diabetes mellitus with hyperglycemia, unspecified whether long term insulin use (H)    Spinal stenosis    Lower extremity edema    Carpal tunnel syndrome of left wrist    Meniere's disease    Abdominal abscess    Lymphedema    Leg wound, right    Cauda equina compression (H)    Rheumatoid arthritis involving both hands with positive rheumatoid factor (H)    COVID-19    Hypoxemia    COVID-19 virus infection    Urinary retention    Candidiasis of mouth    Acute respiratory failure with hypoxia (H)    Mixed incontinence    Leg wound, left       Allergies   Allergen Reactions    Propofol Nausea and Vomiting    Shellfish Allergy      Other reaction(s): Dizziness    Capsaicin Rash and Blisters    Capsaicin Blisters and Rash    Tegaderm Transparent Dressing (Informational Only) Itching and Rash       Current Outpatient Medications   Medication Sig Dispense Refill    acetaminophen (TYLENOL) 325 MG tablet 325 mg every 6 hours as needed      benzonatate (TESSALON) 100 MG capsule TAKE 1 CAP BY MOUTH THREE TIMES DAILY AS NEEDED (Patient not taking: Reported on 10/1/2024)      blood glucose (ACCU-CHEK FASTCLIX) lancing device FOR TESTING ONCE DAILY. DX  E11.9 TYPE 2 DIABETES      blood glucose (CONTOUR TEST) test strip TESTING EVERY DAY DX  E11.9      busPIRone (BUSPAR) 7.5 MG tablet Take 7.5 mg by mouth 2 times daily      CONTOUR NEXT TEST test strip TESTING EVERY DAY DX  E11.9      diazepam (VALIUM) 2 MG tablet Take 1 tablet (2 mg) by mouth every 6 hours as needed  for anxiety 10 tablet 0    DULoxetine (CYMBALTA) 60 MG capsule Take 60 mg by mouth every morning      estradiol (ESTRACE) 0.1 MG/GM vaginal cream Place 1 g vaginally three times a week 42.5 g 3    glipiZIDE (GLUCOTROL) 10 MG tablet Take 10 mg by mouth daily (with breakfast)      glipiZIDE (GLUCOTROL) 5 MG tablet Take 5 mg by mouth daily (with dinner)      hydrocortisone (CORTAID) 1 % external cream Apply topically 2 times daily as needed (hemorrhoid irritation) To hemorrhoids.      hydrOXYzine (ATARAX) 25 MG tablet Take 25 mg by mouth 3 times daily as needed for anxiety      lamoTRIgine (LAMICTAL) 100 MG tablet Take 100 mg by mouth 2 times daily      melatonin 3 MG tablet Take 1 tablet (3 mg) by mouth nightly as needed for sleep      metFORMIN (GLUCOPHAGE XR) 500 MG 24 hr tablet Take 500 mg by mouth daily (with breakfast)      miconazole (MICATIN) 2 % external powder Apply topically 2 times daily as needed for other (candidiasis/intertrigo) To skin folds      mirabegron (MYRBETRIQ) 25 MG 24 hr tablet Take 1 tablet (25 mg) by mouth daily (Patient not taking: Reported on 10/1/2024) 90 tablet 3    mirabegron (MYRBETRIQ) 50 MG 24 hr tablet Take 1 tablet (50 mg) by mouth daily. 90 tablet 2    omeprazole (PRILOSEC) 20 MG DR capsule Take 20 mg by mouth daily      oxyCODONE-acetaminophen (PERCOCET) 5-325 MG tablet Take 1 tablet by mouth 4 times daily as needed for pain (Patient not taking: Reported on 10/1/2024) 5 tablet 0    phenazopyridine (PYRIDIUM) 200 MG tablet Take 1 tablet (200 mg) by mouth 3 times daily as needed for irritation 21 tablet 0    polyethylene glycol (MIRALAX) 17 GM/Dose powder Take 17 g by mouth daily      predniSONE (DELTASONE) 1 MG tablet Take 1 mg by mouth every morning (In addition to the 5 mg dose; 1 mg + 5 mg = 6 mg)      predniSONE (DELTASONE) 5 MG tablet Take 5 mg by mouth every morning (In addition to the 1 mg dose; 5 mg + 1 mg = 6 mg)      pregabalin (LYRICA) 100 MG capsule Take 100 mg by  "mouth 2 times daily      solifenacin (VESICARE) 5 MG tablet Take 1 tablet (5 mg) by mouth daily 90 tablet 3    triamterene-HCTZ (DYAZIDE) 37.5-25 MG capsule Take 1 capsule by mouth every morning Hold for SBP<110         Social History     Tobacco Use    Smoking status: Former     Current packs/day: 0.00     Types: Cigarettes     Quit date: 11/15/1987     Years since quittin.1    Smokeless tobacco: Former   Substance Use Topics    Alcohol use: Not Currently     Alcohol/week: 0.0 standard drinks of alcohol     Comment: MALISSA -paco since     Drug use: No     Comment: used marijuanna in the past       Jumana Yang comes into clinic today at the request of Rajwinder Clifton MD for PTNS treatment.    This service provided today was under the direct supervision of Harika Pastor CNP, who was available if needed.    Percutaneous Tibial Nerve Stimulation (PTNS)    Treatment number : 1    Percutaneous tibial nerve stimulation (PTNS) was prescribed for Jumana Yang's Overactive Bladder symptoms of urge incontinence. The needle electrode was inserted into the lower, inner aspect of the right leg. The surface electrode was placed on the inside arch of the foot on the treatment leg. The lead set was connected to the stimulator, and the needle electrode clip was connected to the needle electrode. The stimulator that produces an adjustable electrical pulse that travels to the sacral nerve plexus via the tibial nerve was adjusted to a setting of 15 with good toe curl noted. The voiding diary or patient's symptoms were reviewed prior to the start of treatment. The patient experienced no changes since the last treatment.    Additional notes: Captioning services were utilized throughout the visit. The patient reported significant lymphedema and neuropathy in her legs which I noted. Toe curl was seen around level 4 but the patient was reporting only feeling a \"heavy sensation\" in her toes. The needle was inserted deeply into the " "ankle due to the edema. At level 19, the patient noted a \"weird\" sensation in the site of her recent carpal tunnel surgery along with tingling in her left leg. I then backed the level down to 15 and the sensation in the hand went away. She immediately used the restroom after treatment. Hard to know if the needle placement was appropriate due to the patient's chronic conditions.      Lashell Rodriguez  12/31/2024  1:50 PM   "

## 2025-01-06 ENCOUNTER — PRE VISIT (OUTPATIENT)
Dept: UROLOGY | Facility: CLINIC | Age: 85
End: 2025-01-06
Payer: MEDICARE

## 2025-01-06 NOTE — TELEPHONE ENCOUNTER
Reason for nurse visit: PTNS #2    Diagnosis: urge incontinence     Ordering provider: Rajwinder Clifton MD    Last seen by provider: 10/1/24       Allergies   Allergen Reactions    Propofol Nausea and Vomiting    Shellfish Allergy      Other reaction(s): Dizziness    Capsaicin Rash and Blisters    Capsaicin Blisters and Rash    Tegaderm Transparent Dressing (Informational Only) Itching and Rash        Lashell Rodriguez

## 2025-01-07 ENCOUNTER — OFFICE VISIT (OUTPATIENT)
Dept: UROLOGY | Facility: CLINIC | Age: 85
End: 2025-01-07
Payer: MEDICARE

## 2025-01-07 DIAGNOSIS — N39.46 MIXED INCONTINENCE: ICD-10-CM

## 2025-01-07 DIAGNOSIS — R39.15 URINARY URGENCY: ICD-10-CM

## 2025-01-07 DIAGNOSIS — N39.41 URGE INCONTINENCE: Primary | ICD-10-CM

## 2025-01-14 ENCOUNTER — OFFICE VISIT (OUTPATIENT)
Dept: INTERPRETER SERVICES | Facility: CLINIC | Age: 85
End: 2025-01-14
Payer: MEDICARE

## 2025-01-14 ENCOUNTER — DOCUMENTATION ONLY (OUTPATIENT)
Dept: AUDIOLOGY | Facility: CLINIC | Age: 85
End: 2025-01-14

## 2025-01-14 DIAGNOSIS — H90.3 SENSORINEURAL HEARING LOSS, BILATERAL: Primary | ICD-10-CM

## 2025-01-14 DIAGNOSIS — N39.41 URGENCY INCONTINENCE: Primary | ICD-10-CM

## 2025-01-14 NOTE — PROGRESS NOTES
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    GERD (gastroesophageal reflux disease)    CKD (chronic kidney disease) stage 3, GFR 30-59 ml/min (H)    Iron deficiency anemia due to chronic blood loss    Deafness    Abscess,& FB in liver in 4-10 & 4-11 w recurrent pain in RUQ  in 7-13 s/po lap I&D and unroofing sans finding of an FB  in 11-13     Elevated liver enzymes since 12-13 liver abscess I&D    Temporomandibular joint disorder    Bipolar disorder, in full remission, most recent episode depressed    Diarrhea r/o exacerbated by metformin -since 30's    Baker's cyst, left    Lower urinary tract infectious disease    Left-sided low back pain with left-sided sciatica since 1993 w reoccurrence 6-15     Meniere's disease (cochlear hydrops), LT    Steroid dependent for cochlear hydrops  since 1985     Bilateral back pain    Primary insomnia    Mixed hyperlipidemia    Localized edema-L>R lat malleolus    Microalbuminuria    Leukocytosis w Lt shift     Long-term (current) use of anticoagulants [Z79.01]    Essential hypertension    Ankle edema    Sensorineural hearing loss, bilateral    Chronic pain syndrome-issue of broken controlled sub agreement     Bilateral leg edema worsening w her increasing inactivity     Muscle weakness (generalized) worse since 11-16 w drowsiness    Type 2 diabetes mellitus with diabetic nephropathy, without long-term current use of insulin (H)    Class 2 obesity due to excess calories with serious comorbidity and body mass index (BMI) of 37.0 to 37.9 in adult    Bilateral deafness    Confusion    Hyperglycemia    Acute renal failure, unspecified acute renal failure type    Pneumonia of right lower lobe due to infectious organism    Severe sepsis (H)    Type 2 diabetes mellitus with  hyperglycemia (H)    Glycosuria    Paravertebral mass    Abnormal finding on CT scan    Bilateral hearing loss, unspecified hearing loss type    Spasm of back muscles    Neural foraminal stenosis of cervical spine    Multilevel spondylosis    Anxiety and depression    Slow transit constipation    Weakness    Fall, initial encounter    Hypokalemia    Contusion of coccyx, initial encounter    Claudication of both lower extremities    Type 2 diabetes mellitus with hyperglycemia, unspecified whether long term insulin use (H)    Spinal stenosis    Lower extremity edema    Carpal tunnel syndrome of left wrist    Meniere's disease    Abdominal abscess    Lymphedema    Leg wound, right    Cauda equina compression (H)    Rheumatoid arthritis involving both hands with positive rheumatoid factor (H)    COVID-19    Hypoxemia    COVID-19 virus infection    Urinary retention    Candidiasis of mouth    Acute respiratory failure with hypoxia (H)    Mixed incontinence    Leg wound, left       Allergies   Allergen Reactions    Propofol Nausea and Vomiting    Shellfish Allergy      Other reaction(s): Dizziness    Capsaicin Rash and Blisters    Capsaicin Blisters and Rash    Tegaderm Transparent Dressing (Informational Only) Itching and Rash       Current Outpatient Medications   Medication Sig Dispense Refill    acetaminophen (TYLENOL) 325 MG tablet 325 mg every 6 hours as needed      benzonatate (TESSALON) 100 MG capsule TAKE 1 CAP BY MOUTH THREE TIMES DAILY AS NEEDED (Patient not taking: Reported on 10/1/2024)      blood glucose (ACCU-CHEK FASTCLIX) lancing device FOR TESTING ONCE DAILY. DX  E11.9 TYPE 2 DIABETES      blood glucose (CONTOUR TEST) test strip TESTING EVERY DAY DX  E11.9      busPIRone (BUSPAR) 7.5 MG tablet Take 7.5 mg by mouth 2 times daily      CONTOUR NEXT TEST test strip TESTING EVERY DAY DX  E11.9      diazepam (VALIUM) 2 MG tablet Take 1 tablet (2 mg) by mouth every 6 hours as needed for anxiety 10 tablet 0     DULoxetine (CYMBALTA) 60 MG capsule Take 60 mg by mouth every morning      estradiol (ESTRACE) 0.1 MG/GM vaginal cream Place 1 g vaginally three times a week 42.5 g 3    glipiZIDE (GLUCOTROL) 10 MG tablet Take 10 mg by mouth daily (with breakfast)      glipiZIDE (GLUCOTROL) 5 MG tablet Take 5 mg by mouth daily (with dinner)      hydrocortisone (CORTAID) 1 % external cream Apply topically 2 times daily as needed (hemorrhoid irritation) To hemorrhoids.      hydrOXYzine (ATARAX) 25 MG tablet Take 25 mg by mouth 3 times daily as needed for anxiety      lamoTRIgine (LAMICTAL) 100 MG tablet Take 100 mg by mouth 2 times daily      melatonin 3 MG tablet Take 1 tablet (3 mg) by mouth nightly as needed for sleep      metFORMIN (GLUCOPHAGE XR) 500 MG 24 hr tablet Take 500 mg by mouth daily (with breakfast)      miconazole (MICATIN) 2 % external powder Apply topically 2 times daily as needed for other (candidiasis/intertrigo) To skin folds      mirabegron (MYRBETRIQ) 25 MG 24 hr tablet Take 1 tablet (25 mg) by mouth daily (Patient not taking: Reported on 10/1/2024) 90 tablet 3    mirabegron (MYRBETRIQ) 50 MG 24 hr tablet Take 1 tablet (50 mg) by mouth daily. 90 tablet 2    omeprazole (PRILOSEC) 20 MG DR capsule Take 20 mg by mouth daily      oxyCODONE-acetaminophen (PERCOCET) 5-325 MG tablet Take 1 tablet by mouth 4 times daily as needed for pain (Patient not taking: Reported on 10/1/2024) 5 tablet 0    phenazopyridine (PYRIDIUM) 200 MG tablet Take 1 tablet (200 mg) by mouth 3 times daily as needed for irritation 21 tablet 0    polyethylene glycol (MIRALAX) 17 GM/Dose powder Take 17 g by mouth daily      predniSONE (DELTASONE) 1 MG tablet Take 1 mg by mouth every morning (In addition to the 5 mg dose; 1 mg + 5 mg = 6 mg)      predniSONE (DELTASONE) 5 MG tablet Take 5 mg by mouth every morning (In addition to the 1 mg dose; 5 mg + 1 mg = 6 mg)      pregabalin (LYRICA) 100 MG capsule Take 100 mg by mouth 2 times daily       solifenacin (VESICARE) 5 MG tablet Take 1 tablet (5 mg) by mouth daily 90 tablet 3    triamterene-HCTZ (DYAZIDE) 37.5-25 MG capsule Take 1 capsule by mouth every morning Hold for SBP<110         Social History     Tobacco Use    Smoking status: Former     Current packs/day: 0.00     Types: Cigarettes     Quit date: 11/15/1987     Years since quittin.1    Smokeless tobacco: Former   Substance Use Topics    Alcohol use: Not Currently     Alcohol/week: 0.0 standard drinks of alcohol     Comment: MALISSA -paco since     Drug use: No     Comment: used marijuanna in the past       Jumana Yang comes into clinic today at the request of Rajwinder Clifton MD  for PTNS treatment.    This service provided today was under the direct supervision of Matheus Centeno MD, who was available if needed.    Percutaneous Tibial Nerve Stimulation (PTNS)    Treatment number : 3    Percutaneous tibial nerve stimulation (PTNS) was prescribed for Jumana Yang's Overactive Bladder symptoms of urinary urgency and urge incontinence. The needle electrode was inserted into the lower, inner aspect of the right leg. The surface electrode was placed on the inside arch of the foot on the treatment leg. The lead set was connected to the stimulator, and the needle electrode clip was connected to the needle electrode. The stimulator that produces an adjustable electrical pulse that travels to the sacral nerve plexus via the tibial nerve was adjusted to a setting of 13, the voiding diary or patient's symptoms were reviewed prior to the start of treatment. The patient experienced increase in frequency since the last treatment.    Additional notes: Captioning services were used for this visit. The patient experienced pain with the initial needle poke, so writer moved needle to a new location with the patient's consent and subsequently the patient noted feeling tingling in the right foot without pain. Writer then started advancing up the levels and  "patient noted \"waves\" of a buzzing, tingling sensation at level 13. Writer asked if the patient was feeling the treatment to which the patient replied with \"yes\" and timer was started.Writer's trainee went to check on the patient at th 15 minute point, and the patient noted not feeling any sensation at all. Writer went into the room and the patient then started complaining of waves of pain. Writer turned off the stimulation and removed the needle and the pain was still present. Writer then let patient rest and pain resolved after a few minutes. Patient became tearful and asked if this means she should not continue treatments. Writer instructed that she should plan on trying again, but that Dr. Clifton would be contacted with instruction for moving forward. Patient also noted dysuria for this visit, but ultimately decided to ask about it in her primary care visit tomorrow. -Message sent to Angela Mora RN and MD Lashell Alcaraz  1/14/2025  3:27 PM    "

## 2025-01-15 ENCOUNTER — TELEPHONE (OUTPATIENT)
Dept: UROLOGY | Facility: CLINIC | Age: 85
End: 2025-01-15
Payer: MEDICARE

## 2025-01-15 NOTE — PROGRESS NOTES
Walk-in hearing aid services on 1/14/25: The patient's son brought her left hearing aid to the [facility]iology [facility] to be cleaned and checked. A significant amount of wax was noted on the outside of the earmold and inside the tubing. The hearing aid and earmold were cleaned and the tubing was replaced. A listening check found the hearing aid to be working properly and it was returned to the patient's son. He expressed concern that the tonehook seemed loose when he brought in the hearing aid. He was shown that it is a threaded piece that simply needed to be turned to tighten.    A clean & check charge is being billed for today's services.    [facility]  Audiology Clinic Assitant      I delegated this task to the audiology assistant and approve of the assessment and services provided for Jumana's hearing aids.    Goran Bejarano, CCC-A  Clinical Audiologist  [place] #[id number]

## 2025-01-16 ENCOUNTER — PRE VISIT (OUTPATIENT)
Dept: UROLOGY | Facility: CLINIC | Age: 85
End: 2025-01-16
Payer: MEDICARE

## 2025-01-16 NOTE — TELEPHONE ENCOUNTER
Reason for nurse visit: PTNS #4  needs cart services captioning for hearing impairment    Diagnosis: Urge incontinence     Ordering provider: Rajwinder Clifton MD    Last seen by provider: 10/1/24    Upon advice from Dr. Clifton- Patient should try PTNS one more time. If patient experiences pain and abnormal sensations, she should either a) Discuss interstim and schedule with Dr. Austin or Dr. Merrill   or  b) Discuss botox and schedule a virtual visit with Dr. Clifton       Allergies   Allergen Reactions    Propofol Nausea and Vomiting    Shellfish Allergy      Other reaction(s): Dizziness    Capsaicin Rash and Blisters    Capsaicin Blisters and Rash    Tegaderm Transparent Dressing (Informational Only) Itching and Rash        Lashell Rodriguez

## 2025-01-21 ENCOUNTER — OFFICE VISIT (OUTPATIENT)
Dept: UROLOGY | Facility: CLINIC | Age: 85
End: 2025-01-21
Payer: MEDICARE

## 2025-01-21 DIAGNOSIS — N39.41 URGE INCONTINENCE: Primary | ICD-10-CM

## 2025-01-21 PROCEDURE — 64566 NEUROELTRD STIM POST TIBIAL: CPT

## 2025-01-21 NOTE — PROGRESS NOTES
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    GERD (gastroesophageal reflux disease)    CKD (chronic kidney disease) stage 3, GFR 30-59 ml/min (H)    Iron deficiency anemia due to chronic blood loss    Deafness    Abscess,& FB in liver in 4-10 & 4-11 w recurrent pain in RUQ  in 7-13 s/po lap I&D and unroofing sans finding of an FB  in 11-13     Elevated liver enzymes since 12-13 liver abscess I&D    Temporomandibular joint disorder    Bipolar disorder, in full remission, most recent episode depressed    Diarrhea r/o exacerbated by metformin -since 30's    Baker's cyst, left    Lower urinary tract infectious disease    Left-sided low back pain with left-sided sciatica since 1993 w reoccurrence 6-15     Meniere's disease (cochlear hydrops), LT    Steroid dependent for cochlear hydrops  since 1985     Bilateral back pain    Primary insomnia    Mixed hyperlipidemia    Localized edema-L>R lat malleolus    Microalbuminuria    Leukocytosis w Lt shift     Long-term (current) use of anticoagulants [Z79.01]    Essential hypertension    Ankle edema    Sensorineural hearing loss, bilateral    Chronic pain syndrome-issue of broken controlled sub agreement     Bilateral leg edema worsening w her increasing inactivity     Muscle weakness (generalized) worse since 11-16 w drowsiness    Type 2 diabetes mellitus with diabetic nephropathy, without long-term current use of insulin (H)    Class 2 obesity due to excess calories with serious comorbidity and body mass index (BMI) of 37.0 to 37.9 in adult    Bilateral deafness    Confusion    Hyperglycemia    Acute renal failure, unspecified acute renal failure type    Pneumonia of right lower lobe due to infectious organism    Severe sepsis (H)    Type 2 diabetes mellitus with  hyperglycemia (H)    Glycosuria    Paravertebral mass    Abnormal finding on CT scan    Bilateral hearing loss, unspecified hearing loss type    Spasm of back muscles    Neural foraminal stenosis of cervical spine    Multilevel spondylosis    Anxiety and depression    Slow transit constipation    Weakness    Fall, initial encounter    Hypokalemia    Contusion of coccyx, initial encounter    Claudication of both lower extremities    Type 2 diabetes mellitus with hyperglycemia, unspecified whether long term insulin use (H)    Spinal stenosis    Lower extremity edema    Carpal tunnel syndrome of left wrist    Meniere's disease    Abdominal abscess    Lymphedema    Leg wound, right    Cauda equina compression (H)    Rheumatoid arthritis involving both hands with positive rheumatoid factor (H)    COVID-19    Hypoxemia    COVID-19 virus infection    Urinary retention    Candidiasis of mouth    Acute respiratory failure with hypoxia (H)    Mixed incontinence    Leg wound, left       Allergies   Allergen Reactions    Propofol Nausea and Vomiting    Shellfish Allergy      Other reaction(s): Dizziness    Capsaicin Rash and Blisters    Capsaicin Blisters and Rash    Tegaderm Transparent Dressing (Informational Only) Itching and Rash       Current Outpatient Medications   Medication Sig Dispense Refill    acetaminophen (TYLENOL) 325 MG tablet 325 mg every 6 hours as needed      benzonatate (TESSALON) 100 MG capsule TAKE 1 CAP BY MOUTH THREE TIMES DAILY AS NEEDED (Patient not taking: Reported on 10/1/2024)      blood glucose (ACCU-CHEK FASTCLIX) lancing device FOR TESTING ONCE DAILY. DX  E11.9 TYPE 2 DIABETES      blood glucose (CONTOUR TEST) test strip TESTING EVERY DAY DX  E11.9      busPIRone (BUSPAR) 7.5 MG tablet Take 7.5 mg by mouth 2 times daily      CONTOUR NEXT TEST test strip TESTING EVERY DAY DX  E11.9      diazepam (VALIUM) 2 MG tablet Take 1 tablet (2 mg) by mouth every 6 hours as needed for anxiety 10 tablet 0     DULoxetine (CYMBALTA) 60 MG capsule Take 60 mg by mouth every morning      estradiol (ESTRACE) 0.1 MG/GM vaginal cream Place 1 g vaginally three times a week 42.5 g 3    glipiZIDE (GLUCOTROL) 10 MG tablet Take 10 mg by mouth daily (with breakfast)      glipiZIDE (GLUCOTROL) 5 MG tablet Take 5 mg by mouth daily (with dinner)      hydrocortisone (CORTAID) 1 % external cream Apply topically 2 times daily as needed (hemorrhoid irritation) To hemorrhoids.      hydrOXYzine (ATARAX) 25 MG tablet Take 25 mg by mouth 3 times daily as needed for anxiety      lamoTRIgine (LAMICTAL) 100 MG tablet Take 100 mg by mouth 2 times daily      melatonin 3 MG tablet Take 1 tablet (3 mg) by mouth nightly as needed for sleep      metFORMIN (GLUCOPHAGE XR) 500 MG 24 hr tablet Take 500 mg by mouth daily (with breakfast)      miconazole (MICATIN) 2 % external powder Apply topically 2 times daily as needed for other (candidiasis/intertrigo) To skin folds      mirabegron (MYRBETRIQ) 25 MG 24 hr tablet Take 1 tablet (25 mg) by mouth daily (Patient not taking: Reported on 10/1/2024) 90 tablet 3    mirabegron (MYRBETRIQ) 50 MG 24 hr tablet Take 1 tablet (50 mg) by mouth daily. 90 tablet 2    omeprazole (PRILOSEC) 20 MG DR capsule Take 20 mg by mouth daily      oxyCODONE-acetaminophen (PERCOCET) 5-325 MG tablet Take 1 tablet by mouth 4 times daily as needed for pain (Patient not taking: Reported on 10/1/2024) 5 tablet 0    phenazopyridine (PYRIDIUM) 200 MG tablet Take 1 tablet (200 mg) by mouth 3 times daily as needed for irritation 21 tablet 0    polyethylene glycol (MIRALAX) 17 GM/Dose powder Take 17 g by mouth daily      predniSONE (DELTASONE) 1 MG tablet Take 1 mg by mouth every morning (In addition to the 5 mg dose; 1 mg + 5 mg = 6 mg)      predniSONE (DELTASONE) 5 MG tablet Take 5 mg by mouth every morning (In addition to the 1 mg dose; 5 mg + 1 mg = 6 mg)      pregabalin (LYRICA) 100 MG capsule Take 100 mg by mouth 2 times daily       "solifenacin (VESICARE) 5 MG tablet Take 1 tablet (5 mg) by mouth daily 90 tablet 3    triamterene-HCTZ (DYAZIDE) 37.5-25 MG capsule Take 1 capsule by mouth every morning Hold for SBP<110         Social History     Tobacco Use    Smoking status: Former     Current packs/day: 0.00     Types: Cigarettes     Quit date: 11/15/1987     Years since quittin.2    Smokeless tobacco: Former   Substance Use Topics    Alcohol use: Not Currently     Alcohol/week: 0.0 standard drinks of alcohol     Comment: MALISSA -ber since     Drug use: No     Comment: used marijuanna in the past       Jumana Yang comes into clinic today at the request of Rajwinder Clifton MD for PTNS treatment.    This service provided today was under the direct supervision of Alexis Pappas MD, who was available if needed.    Percutaneous Tibial Nerve Stimulation (PTNS)    Treatment number : 4    Captioning services were used for this appointment.     Percutaneous tibial nerve stimulation (PTNS) was prescribed for Jumana Yang's Overactive Bladder symptoms of urinary urgency and incontinence. The needle electrode was inserted into the lower, inner aspect of the right leg. The surface electrode was placed on the inside arch of the foot on the treatment leg. The lead set was connected to the stimulator, and the needle electrode clip was connected to the needle electrode. The stimulator that produces an adjustable electrical pulse that travels to the sacral nerve plexus via the tibial nerve was adjusted to a setting of 20 with no toe curl noted. No sensation of treatment was noted by the patient. Some pain with needle pain was noted. This was repeated for three different needle locations with the same results. The patient was discouraged by the lack of sensation and said \"this isn't going to work\". The patient experienced no changes since the last treatment. Writer encouraged the patient to schedule follow-up with Dr. Clifton to discuss Botox treatment, as " the patient is not interested in interstim.       Lashell Rodriguez  1/21/2025  2:06 PM

## 2025-01-22 ENCOUNTER — TELEPHONE (OUTPATIENT)
Dept: WOUND CARE | Facility: CLINIC | Age: 85
End: 2025-01-22
Payer: MEDICARE

## 2025-01-22 NOTE — TELEPHONE ENCOUNTER
[facility] is requesting the Rx for the [facility] order from 5/1/2024 sent to them via fax for documentation purposes.     Fax [phone]  Phone [phone]

## 2025-01-27 ENCOUNTER — TELEPHONE (OUTPATIENT)
Dept: UROLOGY | Facility: CLINIC | Age: 85
End: 2025-01-27
Payer: MEDICARE

## 2025-01-27 DIAGNOSIS — N39.41 URGE INCONTINENCE: ICD-10-CM

## 2025-01-27 DIAGNOSIS — N39.41 URGE INCONTINENCE OF URINE: ICD-10-CM

## 2025-01-27 RX ORDER — MIRABEGRON 50 MG/1
50 TABLET, FILM COATED, EXTENDED RELEASE ORAL DAILY
Qty: 90 TABLET | Refills: 2 | Status: CANCELLED | OUTPATIENT
Start: 2025-01-27

## 2025-01-27 NOTE — TELEPHONE ENCOUNTER
Received refill request for mirabegron. Pt last seen in clinic 1/7/2025. This RN called pharmacy to verify if they have refills. Pharmacy said they did and Tonya just got her meds on 1/9/25 and too early to be picked up. This RN called Tonya who said she has been taking Mirabegron and does not feel that it is helping and believed Dr. Clifton increased the dose due to her symptoms. Says dose increase not helping and she tried tens stimulation in clinic with no signs or improvement and next thing was to try botox next. Tonya was asking for a sooner appointment this RN found her one on 2/11/25 which she happily accepted.

## 2025-01-28 NOTE — TELEPHONE ENCOUNTER
Home care nurse called and this RN spoke to her, went over call details from yesterday and appointment change, home care nurse voicing understanding.

## 2025-01-28 NOTE — TELEPHONE ENCOUNTER
Health Call Center    Phone Message    May a detailed message be left on voicemail: yes     Reason for Call: Other: Abbi from [facility] called in requesting to speak with provider's nurse in regards to the mirabegron. Abbi states that the clinic called the patient back yesterday 1/27/25 but patient did not know what was discussed. Please call Abbi back to discuss.      Action Taken: Message routed to:  [facility] women's uro    Travel Screening: Not Applicable     Date of Service:

## 2025-01-31 ENCOUNTER — TELEPHONE (OUTPATIENT)
Dept: UROLOGY | Facility: CLINIC | Age: 85
End: 2025-01-31
Payer: MEDICARE

## 2025-01-31 DIAGNOSIS — N39.46 MIXED INCONTINENCE: Primary | ICD-10-CM

## 2025-01-31 NOTE — TELEPHONE ENCOUNTER
[facility] Call [facility]    Phone Message    May a detailed message be left on voicemail: yes     Reason for Call: CHERELLE Go from home care is calling on behalf of patient. Patient is requesting/asking if she can get trospium chloride for incontinence. Please review and call CHERELLE Go back.    Action Taken: Other: [facility] - Urology    Travel Screening: Not Applicable     Date of Service:

## 2025-01-31 NOTE — TELEPHONE ENCOUNTER
Nurse from long term facility is requesting trospium chloride for incontinence. Order pended for Dr. Clifton.

## 2025-02-04 RX ORDER — TROSPIUM CHLORIDE 20 MG/1
20 TABLET, FILM COATED ORAL
Qty: 180 TABLET | Refills: 3 | Status: SHIPPED | OUTPATIENT
Start: 2025-02-04

## 2025-02-08 DIAGNOSIS — N95.2 VAGINAL ATROPHY: ICD-10-CM

## 2025-02-08 DIAGNOSIS — N39.46 MIXED INCONTINENCE: ICD-10-CM

## 2025-02-09 NOTE — DISCHARGE INSTRUCTIONS
Lymphedema Therapy Instructions for Discharge:    Apply short stretch compression bandages in the following sequence, if tolerated patient can wear for 24-48 hours between changes.   1. Wash legs thoroughly with mild soap, then apply lotion or Aquaphor (or like product).  2. Pull on stockinette, additional length can be left near toes and above knee to roll back over bandages once applied.   3. Apply the white cotton padding layer. Wrap the cotton padding in a spiral pattern from toes to knee.  4. Start with a 8-cm wide brown bandage (smaller bandage). From the base of the toes, anchor around foot 2-3 times and continue to spiral up the foot and leg, ending above the ankle. Use tape to secure bandage end.   5. Next, with the 10-cm wide brown bandage (bigger bandage), start at the ankle. Spiral the bandage up to knee. Use tape to secure bandage end.   6. Finish by folding the stockinette over bandage at the toe and knee ends to help secure wraps.     Compression should be removed if pain, numbness/tingling, or if soiled.     Care for bandages: Brown bandages can be hand washed every 7-10 days with mild soap or detergent, hang flat to dry, getting out as many wrinkles as possible.  Lay flat if possible to avoid stretching.  When rolling bandages back up after dry, try to roll them up as tight as possible for easier reapplication.     To view the [employer] video with instructions and video on how to bandage legs:  1. Go to [email or web address]  2. Search [facility] Leg Bandaging    It should be the first video by [facility].     Tubigrip:  Patient now tolerating size E Tubigrip (tan tubular compression) to manage LE edema, should wear 24-hours/day and remove once daily for skin check/moisturizing. Moncho from toes to knees, double back and fold over foot for 2-layer compression. Tubigrip can be hand washed every 7-10 days (or as needed), lay flat to dry. Please ensure Tubigrip lays flat to avoid tourniquet effect. There should be NO  wrinkles or folding over in any place. Tubigrip should be removed if pain, numbness/tingling, or if soiled.      -----------------------------------------------------------------------------------------------------------------------------      Ananya Mclean NP    General - Nurse Practitioner - Adult Health    [phone]   It is recommended to follow up with your primary care provider in 7 days    Statement Selected

## 2025-02-10 ENCOUNTER — TELEPHONE (OUTPATIENT)
Dept: AUDIOLOGY | Facility: CLINIC | Age: 85
End: 2025-02-10
Payer: MEDICARE

## 2025-02-10 NOTE — TELEPHONE ENCOUNTER
[facility] Call Center    Phone Message    May a detailed message be left on voicemail: yes     Reason for Call: Other: Home Care provider is requesting to speak with provider regarding medication. Pt wants to know if she can increase dosage for Prednisone to help with her hearing. Please call to advise. Did confirm phone number. Thanks      Action Taken: Message routed to:  [facility] AUDIO    Travel Screening: Not Applicable     Date of Service:

## 2025-02-11 ENCOUNTER — OFFICE VISIT (OUTPATIENT)
Dept: UROLOGY | Facility: CLINIC | Age: 85
End: 2025-02-11
Attending: OBSTETRICS & GYNECOLOGY
Payer: MEDICARE

## 2025-02-11 VITALS — DIASTOLIC BLOOD PRESSURE: 79 MMHG | HEART RATE: 88 BPM | SYSTOLIC BLOOD PRESSURE: 139 MMHG

## 2025-02-11 DIAGNOSIS — N39.41 URGE INCONTINENCE: Primary | ICD-10-CM

## 2025-02-11 PROCEDURE — G0463 HOSPITAL OUTPT CLINIC VISIT: HCPCS | Performed by: OBSTETRICS & GYNECOLOGY

## 2025-02-11 RX ORDER — TROSPIUM CHLORIDE 20 MG/1
20 TABLET, FILM COATED ORAL
Qty: 120 TABLET | Refills: 3 | Status: SHIPPED | OUTPATIENT
Start: 2025-02-11

## 2025-02-11 NOTE — LETTER
2/11/2025       RE: Jumana Yang  [address]  [place] [address]     Dear Colleague,    Thank you for referring your patient, Jumana aYng, to the [facility]. Please see a copy of my visit note below.    Tonya is here to discuss intravesical botulinum toxin injection for her persistent urgency urinary incontinence. She has triad anticholinergics and b3 agonists (mirabegron) as well as PTNS (4 treatments so far) without improvement.     Today we discussed at length what to expect from botox injection along with risk and benefits as described below.     She does have multiple medical commorbidities which may put her at higher risk and she is aware of this . Med hx of spinal stenosis ( on wheel chair), bipolar disorder, COPD, DM ( last HgA1c on 3.21.24 was 8.1), cHTN, IBS     BOTOX     Procedure: A cystoscopy will be performed and medication will be injected into several areas in the bladder wall muscles in small aliquots. Once this has been completed, a voiding trial will occur in the PACU to assess bladder emptying. If there is difficulty with bladder emptying, either a nolasco bladder catheter will be placed or patient will be asked to do intermittent self-catheterization depending on patient preference.   Risks:  Common risks of surgery were reviewed, including bleeding, infection, complications from anesthesia, cardiopulmonary complications. Prophylactic antibiotics will be given at the time of surgery, and sequential compression devices be placed to prevent blood clots as needed. Risks of recurrence of symptoms, risk of urinary tract infection , risk of urinary retention requiring intermittent self-catheterization,  for short or long term after the procedure and risk of reaction.       Assessment and plan    Tonya was seen today for consult.    Diagnoses and all orders for this visit:    Urge  incontinence  -     trospium (SANCTURA) 20 MG tablet; Take 1 tablet (20 mg) by mouth 2 times daily (before meals).  -     Case Request: INJECTION, ONABOTULINUMTOXINA, cystoscopy; Standing  -     Case Request: INJECTION, ONABOTULINUMTOXINA, cystoscopy        She will proceed with botox injection under sedation  Wants to try trospium in the mean time. Prescribed      I spent 20 minutes face-to-face with Jumana Yang ( and her son) on the date of the encounter in chart review, patient visit, review of tests, documentation and/or discussion with other providers about the issues documented above.         Again, thank you for allowing me to participate in the care of your patient.      Sincerely,    Rajwinder Clifton MD

## 2025-02-11 NOTE — PROGRESS NOTES
Tonya is here to discuss intravesical botulinum toxin injection for her persistent urgency urinary incontinence. She has triad anticholinergics and b3 agonists (mirabegron) as well as PTNS (4 treatments so far) without improvement.     Today we discussed at length what to expect from botox injection along with risk and benefits as described below.     She does have multiple medical commorbidities which may put her at higher risk and she is aware of this . Med hx of spinal stenosis ( on wheel chair), bipolar disorder, COPD, DM ( last HgA1c on 3.21.24 was 8.1), cHTN, IBS     BOTOX     Procedure: A cystoscopy will be performed and medication will be injected into several areas in the bladder wall muscles in small aliquots. Once this has been completed, a voiding trial will occur in the PACU to assess bladder emptying. If there is difficulty with bladder emptying, either a nolasco bladder catheter will be placed or patient will be asked to do intermittent self-catheterization depending on patient preference.   Risks:  Common risks of surgery were reviewed, including bleeding, infection, complications from anesthesia, cardiopulmonary complications. Prophylactic antibiotics will be given at the time of surgery, and sequential compression devices be placed to prevent blood clots as needed. Risks of recurrence of symptoms, risk of urinary tract infection , risk of urinary retention requiring intermittent self-catheterization,  for short or long term after the procedure and risk of reaction.       Assessment and plan    Tonya was seen today for consult.    Diagnoses and all orders for this visit:    Urge incontinence  -     trospium (SANCTURA) 20 MG tablet; Take 1 tablet (20 mg) by mouth 2 times daily (before meals).  -     Case Request: INJECTION, ONABOTULINUMTOXINA, cystoscopy; Standing  -     Case Request: INJECTION, ONABOTULINUMTOXINA, cystoscopy        She will proceed with botox injection under sedation  Wants to try  trospium in the mean time. Prescribed      I spent 20 minutes face-to-face with Jumana Yang ( and her son) on the date of the encounter in chart review, patient visit, review of tests, documentation and/or discussion with other providers about the issues documented above.

## 2025-02-12 ENCOUNTER — MEDICAL CORRESPONDENCE (OUTPATIENT)
Dept: HEALTH INFORMATION MANAGEMENT | Facility: CLINIC | Age: 85
End: 2025-02-12
Payer: MEDICARE

## 2025-02-12 ENCOUNTER — HOSPITAL ENCOUNTER (OUTPATIENT)
Facility: CLINIC | Age: 85
End: 2025-02-12
Attending: OBSTETRICS & GYNECOLOGY | Admitting: OBSTETRICS & GYNECOLOGY
Payer: MEDICARE

## 2025-02-12 PROBLEM — N39.41 URGE INCONTINENCE: Status: ACTIVE | Noted: 2025-02-11

## 2025-02-12 NOTE — TELEPHONE ENCOUNTER
Pt was asking about increasing steroid. We have not seen he pt in almost 3 years and Dr Nissen has retired from our clinic. We are not able to address. She will let the pt know.    Beti Álvarez LPN

## 2025-02-13 RX ORDER — ESTRADIOL 0.1 MG/G
1 CREAM VAGINAL
Qty: 42.5 G | Refills: 3 | Status: SHIPPED | OUTPATIENT
Start: 2025-02-14

## 2025-02-13 NOTE — TELEPHONE ENCOUNTER
Last Written Prescription:      Disp Refills Start End JAVED   estradiol (ESTRACE) 0.1 MG/GM vaginal cream 42.5 g 3 2/14/2024 -- --   Sig - Route: Place 1 g vaginally three times a week - Vaginal     ----------------------  Last Visit Date: 2/11/25  Future Visit Date: None  ----------------------      [x]  Refill decision: Medication refilled per  Medication Refill in Ambulatory Care  policy.         Request from pharmacy:  Requested Prescriptions   Pending Prescriptions Disp Refills    estradiol (ESTRACE) 0.1 MG/GM vaginal cream 42.5 g 3     Sig: Place 1 g vaginally three times a week.       Hormone Replacement Therapy (vaginal) Passed - 2/13/2025 10:38 AM        Passed - Medication is active on med list and the sig matches. RN to manually verify dose and sig if red X/fail.     If the protocol passes (green check), you do not need to verify med dose and sig.    A prescription matches if they are the same clinical intention.    For Example: once daily and every morning are the same.    For all fails (red x), verify dose and sig.    If the refill does match what is on file, the RN can still proceed to approve the refill request.     If they do not match, route to the appropriate provider.             Passed - Recent (12 mo) or future (90 days) visit within the authorizing provider's specialty     The patient must have completed an in-person or virtual visit within the past 12 months or has a future visit scheduled within the next 90 days with the authorizing provider s specialty.  Urgent care and e-visits do not qualify as an office visit for this protocol.          Passed - Medication indicated for associated diagnosis     Medication is associated with one or more of the following diagnoses:     Menopause   Vulva/Vaginal atrophy   UTI prophylaxis          Passed - Patient is 18 years of age or older        Passed - No active pregnancy on record        Passed - No positive pregnancy test on record in past 12 months

## 2025-02-19 ENCOUNTER — TELEPHONE (OUTPATIENT)
Dept: UROLOGY | Facility: CLINIC | Age: 85
End: 2025-02-19
Payer: MEDICARE

## 2025-02-19 ENCOUNTER — HOSPITAL ENCOUNTER (OUTPATIENT)
Facility: CLINIC | Age: 85
End: 2025-02-19
Attending: OBSTETRICS & GYNECOLOGY | Admitting: OBSTETRICS & GYNECOLOGY
Payer: MEDICARE

## 2025-02-19 ENCOUNTER — TELEPHONE (OUTPATIENT)
Dept: OBGYN | Facility: CLINIC | Age: 85
End: 2025-02-19
Payer: MEDICARE

## 2025-02-19 NOTE — TELEPHONE ENCOUNTER
[facility] Call Center    Phone Message    May a detailed message be left on voicemail: yes     Reason for Call: Other: Home care calling with question regarding mirabegron (MYRBETRIQ) dosing. Patient has orders for both 50mg and 25mg. Please call to discuss.     Action Taken: Message routed to:  Other: Urology    Travel Screening: Not Applicable

## 2025-02-19 NOTE — TELEPHONE ENCOUNTER
Pt increased Myrbetriq dose to 50mg in 12/2024. Called Abbi, pt's homecare nurse, to let her know -- no answer, left voice message explaining the above.

## 2025-02-19 NOTE — LETTER
25    Patient: Jumana Yang  YOB: 1940    Thank you for choosing [facility] for your surgical procedure.  You will enter the hospital as an outpatient, or same-day surgery patient, which means that you will enter the hospital the day of your surgery and leave that same day.    Your procedure is scheduled on:  Date: 2025  Time:  1:10 PM  Please arrive at:  11:10 AM  Procedure: INJECTION, ONABOTULINUMTOXINA, cystoscopy   MD: Rajwinder Clifton MD    Location: [address] [place] [place] [address]                  [phone]    NOTE: The times noted above may change.  A nurse from the hospital pre-admission department will call to confirm your procedure.  He or she will answer any questions you have about the procedure and will provide you with instructions for taking medications the day of the procedure.  If you have not received a call, or if you have more questions please call us one working day before your procedure. If you need to cancel or reschedule your surgery please call the surgery scheduling line at [phone].    PRE-OPERATIVE VISIT  You are required to have a pre-op physical (sometimes called an H&P, or History and Physical) 10-30 days before your procedure. This can be done at your primary care clinic or a pre-operative assessment center. If you do not have this exam, we may postpone your procedure.   Please call your primary care clinic to schedule this appointment.  If you do not have a primary care clinic, you may contact the Preoperative Assessment Center (PAC) at [phone]  to schedule this appointment.  If you are having a , you do not need this exam.    FOLLOW-UP/POST OPERATIVE VISIT  Please call the [facility] at [phone] and schedule a follow-up postoperative appointment with your surgeon.  Your visit is due approximately 2 weeks after your  procedure.    COVID TESTING  You do not need a COVID test prior to the procedure.    Cost of Care Estimates or Insurance Questions  If you have questions regarding a cost estimate or insurance coverage, please call one of our Financial Service representatives at [phone] or submit a Pricing Inquiry Request Form    Gynecological Surgery  If you are having a gynecological surgery, please review the packet of information listed here: Before and After Your Gynecological Surgery ([email or web address])    Preparing for Your Surgery  Getting started  A nurse will call you to review your health history and instructions. They will give you an arrival time based on your scheduled surgery time. Please be ready to share:  Your doctor's clinic name and phone number  Your medical, surgical, and anesthesia history  A list of allergies and sensitivities  A list of medicines, including herbal treatments and over-the-counter drugs  Whether the patient has a legal guardian (ask how to send us the papers in advance)  Please tell us if you're pregnant--or if there's any chance you might be pregnant. Some surgeries may injure a fetus (unborn baby), so they require a pregnancy test. Surgeries that are safe for a fetus don't always need a test, and you can choose whether to have one.   If you have a child who's having surgery, please ask for a copy of Preparing for Your Child's Surgery.    Preparing for surgery  Within 10 to 30 days of surgery: Have a pre-op exam (sometimes called an H&P, or History and Physical). This can be done at a clinic or pre-operative center.  If you're having a , you may not need this exam. Talk to your care team.  At your pre-op exam, talk to your care team about all medicines you take. If you need to stop any medicines before surgery, ask when to start taking them again.  We do this for your safety. Many medicines can make you bleed too much during surgery. Some change how well surgery  (anesthesia) drugs work.  Call your insurance company to let them know you're having surgery. (If you don't have insurance, call [phone].)  Call your clinic if there's any change in your health. This includes signs of a cold or flu (sore throat, runny nose, cough, rash, fever). It also includes a scrape or scratch near the surgery site.  If you have questions on the day of surgery, call your hospital or surgery center.  Eating and drinking guidelines  For your safety: Unless your surgeon tells you otherwise, follow the guidelines below.  Eat and drink as usual until 8 hours before you arrive for surgery. After that, no food or milk.  Drink clear liquids until 2 hours before you arrive. These are liquids you can see through, like water, Gatorade, and Propel Water. They also include plain black coffee and tea (no cream or milk), candy, and breath mints. You can spit out gum when you arrive.  If you drink alcohol: Stop drinking it the night before surgery.  If your care team tells you to take medicine on the morning of surgery, it's okay to take it with a sip of water.  SHOWERING BEFORE SURGERY    Purchase 8 ounces of antiseptic surgical soap called 4% CHG. Common name brands of this soap are Hibiclens and Exidine. You might have been given some from the clinic. You can find it at your local pharmacy, if you have trouble locating it, ask the pharmacist to help you. It is also free of charge for all surgical patients at a [facility] pharmacy near you.    Please take 2 showers before surgery:  One the night before surgery and one the morning of surgery.  Wash your hair and body with your regular shampoo and soap. Make sure you rinse the shampoo and soap from your hair and body.  Apply about 2-4 ounces of soap gently on your skin from your chin to your toes. Do not use this soap on your face, head, or groin.  Let the soap stay on your skin for at least 1 minute before rinsing.  After the shower, your skin may  "feel dry, but do not use any lotions, creams, moisturizers, deodorants, or fragrances.  Do not use hair spray or other products in your hair. You do not need to wash your hair twice.  If your hair is long, please bring a rubber band to pull it back off of your face.  Do not shave within 12 inches of your surgical site for at least 3 days before surgery. Shaving can make small cuts in the skin. This puts you at a higher risk of infection.\"   Preventing infection  Don't shave or clip hair near your surgery site. We'll remove the hair if needed.  Don't smoke or vape the morning of surgery. You may chew nicotine gum up to 2 hours before surgery. A nicotine patch is okay.  Note: Some surgeries require you to completely quit smoking and nicotine. Check with your surgeon.  Your care team will make every effort to keep you safe from infection. We will:  Clean our hands often with soap and water (or an alcohol-based hand rub).  Clean the skin at your surgery site with a special soap that kills germs.  Give you a special gown to keep you warm. (Cold raises the risk of infection.)  Wear special hair covers, masks, gowns and gloves during surgery.  Give antibiotic medicine, if prescribed. Not all surgeries need antibiotics.  What to bring on the day of surgery  Photo ID and insurance card  Copy of your health care directive, if you have one  Glasses and hearing aids (bring cases)  You can't wear contacts during surgery  Inhaler and eye drops, if you use them (tell us about these when you arrive)  CPAP machine or breathing device, if you use them  A few personal items, if spending the night  If you have . . .  A pacemaker, ICD (cardiac defibrillator) or other implant: Bring the ID card.  An implanted stimulator: Bring the remote control.  A legal guardian: Bring a copy of the certified (court-stamped) guardianship papers.  Please remove any jewelry, including body piercings. Leave jewelry and other valuables at home.  If you're " going home the day of surgery  You must have a responsible adult drive you home. They should stay with you overnight as well.  If you don't have someone to stay with you, and you aren't safe to go home alone, we may keep you overnight. Insurance often won't pay for this.  After surgery  If it's hard to control your pain or you need more pain medicine, please call your surgeon's office.  Questions?   If you have any questions for your care team, list them here: ________________________________________________________________________________________________________________________________________________________________________________________________________________________________________________________________________________________  For informational purposes only. Not to replace the advice of your health care provider. Copyright   2003, 2019 [facility]. All rights reserved. Clinically reviewed by Katharina Garcia MD. [employer] 786844 - REV 12/22.

## 2025-02-19 NOTE — TELEPHONE ENCOUNTER
Spoke with patient, scheduled surgery. Patient is aware of date, time, location, prep instructions, need for H&P within 30 days.    Type of surgery: INJECTION, ONABOTULINUMTOXINA, cystoscopy   Location of surgery: [facility]  Date and time of surgery: 4/17/25 @ 1:10pm  Surgeon: Dr. Rajwinder Clifton  Pre-Op H&P Date: 10-30 days with PCP  Post-Op Appt Date: 2 weeks with surgeon   Packet sent out: Yes  Pre-cert/Authorization completed:  No  Date: 2/19/25

## 2025-03-24 ENCOUNTER — APPOINTMENT (OUTPATIENT)
Dept: CT IMAGING | Facility: CLINIC | Age: 85
DRG: 552 | End: 2025-03-24
Payer: MEDICARE

## 2025-03-24 ENCOUNTER — HOSPITAL ENCOUNTER (INPATIENT)
Facility: CLINIC | Age: 85
DRG: 552 | End: 2025-03-24
Attending: EMERGENCY MEDICINE | Admitting: HOSPITALIST
Payer: MEDICARE

## 2025-03-24 ENCOUNTER — APPOINTMENT (OUTPATIENT)
Dept: MRI IMAGING | Facility: CLINIC | Age: 85
DRG: 552 | End: 2025-03-24
Payer: MEDICARE

## 2025-03-24 DIAGNOSIS — M48.061 SPINAL STENOSIS OF LUMBAR REGION, UNSPECIFIED WHETHER NEUROGENIC CLAUDICATION PRESENT: ICD-10-CM

## 2025-03-24 DIAGNOSIS — L24.A2 IRRITANT CONTACT DERMATITIS DUE TO URINARY INCONTINENCE: Primary | ICD-10-CM

## 2025-03-24 DIAGNOSIS — R29.898 WEAKNESS OF BOTH LOWER EXTREMITIES: ICD-10-CM

## 2025-03-24 DIAGNOSIS — D64.9 ANEMIA, UNSPECIFIED TYPE: ICD-10-CM

## 2025-03-24 LAB
ALBUMIN UR-MCNC: 30 MG/DL
ANION GAP SERPL CALCULATED.3IONS-SCNC: 14 MMOL/L (ref 7–15)
APPEARANCE UR: CLEAR
BASOPHILS # BLD AUTO: 0.1 10E3/UL (ref 0–0.2)
BASOPHILS NFR BLD AUTO: 1 %
BILIRUB UR QL STRIP: NEGATIVE
BUN SERPL-MCNC: 16.3 MG/DL (ref 8–23)
CALCIUM SERPL-MCNC: 9.5 MG/DL (ref 8.8–10.4)
CHLORIDE SERPL-SCNC: 100 MMOL/L (ref 98–107)
CK SERPL-CCNC: 237 U/L (ref 26–192)
COLOR UR AUTO: YELLOW
CREAT SERPL-MCNC: 1.07 MG/DL (ref 0.51–0.95)
EGFRCR SERPLBLD CKD-EPI 2021: 52 ML/MIN/1.73M2
EOSINOPHIL # BLD AUTO: 0.4 10E3/UL (ref 0–0.7)
EOSINOPHIL NFR BLD AUTO: 3 %
ERYTHROCYTE [DISTWIDTH] IN BLOOD BY AUTOMATED COUNT: 14.3 % (ref 10–15)
GLUCOSE BLDC GLUCOMTR-MCNC: 106 MG/DL (ref 70–99)
GLUCOSE BLDC GLUCOMTR-MCNC: 159 MG/DL (ref 70–99)
GLUCOSE SERPL-MCNC: 129 MG/DL (ref 70–99)
GLUCOSE UR STRIP-MCNC: NEGATIVE MG/DL
HCO3 SERPL-SCNC: 29 MMOL/L (ref 22–29)
HCT VFR BLD AUTO: 42.5 % (ref 35–47)
HGB BLD-MCNC: 13.7 G/DL (ref 11.7–15.7)
HGB UR QL STRIP: ABNORMAL
HOLD SPECIMEN: NORMAL
IMM GRANULOCYTES # BLD: 0.1 10E3/UL
IMM GRANULOCYTES NFR BLD: 0 %
KETONES UR STRIP-MCNC: NEGATIVE MG/DL
LEUKOCYTE ESTERASE UR QL STRIP: NEGATIVE
LYMPHOCYTES # BLD AUTO: 2.9 10E3/UL (ref 0.8–5.3)
LYMPHOCYTES NFR BLD AUTO: 25 %
MCH RBC QN AUTO: 29.5 PG (ref 26.5–33)
MCHC RBC AUTO-ENTMCNC: 32.2 G/DL (ref 31.5–36.5)
MCV RBC AUTO: 91 FL (ref 78–100)
MONOCYTES # BLD AUTO: 1 10E3/UL (ref 0–1.3)
MONOCYTES NFR BLD AUTO: 8 %
MUCOUS THREADS #/AREA URNS LPF: PRESENT /LPF
NEUTROPHILS # BLD AUTO: 7.2 10E3/UL (ref 1.6–8.3)
NEUTROPHILS NFR BLD AUTO: 63 %
NITRATE UR QL: NEGATIVE
NRBC # BLD AUTO: 0 10E3/UL
NRBC BLD AUTO-RTO: 0 /100
PH UR STRIP: 5.5 [PH] (ref 5–7)
PLATELET # BLD AUTO: 208 10E3/UL (ref 150–450)
POTASSIUM SERPL-SCNC: 3.3 MMOL/L (ref 3.4–5.3)
RBC # BLD AUTO: 4.65 10E6/UL (ref 3.8–5.2)
RBC URINE: 1 /HPF
SODIUM SERPL-SCNC: 143 MMOL/L (ref 135–145)
SP GR UR STRIP: 1.03 (ref 1–1.03)
SQUAMOUS EPITHELIAL: <1 /HPF
UROBILINOGEN UR STRIP-MCNC: NORMAL MG/DL
WBC # BLD AUTO: 11.5 10E3/UL (ref 4–11)
WBC URINE: 2 /HPF

## 2025-03-24 PROCEDURE — 99222 1ST HOSP IP/OBS MODERATE 55: CPT | Performed by: HOSPITALIST

## 2025-03-24 PROCEDURE — 85025 COMPLETE CBC W/AUTO DIFF WBC: CPT

## 2025-03-24 PROCEDURE — 255N000002 HC RX 255 OP 636

## 2025-03-24 PROCEDURE — 72131 CT LUMBAR SPINE W/O DYE: CPT

## 2025-03-24 PROCEDURE — 96361 HYDRATE IV INFUSION ADD-ON: CPT

## 2025-03-24 PROCEDURE — 258N000003 HC RX IP 258 OP 636

## 2025-03-24 PROCEDURE — 82550 ASSAY OF CK (CPK): CPT

## 2025-03-24 PROCEDURE — 250N000011 HC RX IP 250 OP 636: Mod: JZ | Performed by: EMERGENCY MEDICINE

## 2025-03-24 PROCEDURE — 96375 TX/PRO/DX INJ NEW DRUG ADDON: CPT

## 2025-03-24 PROCEDURE — 72158 MRI LUMBAR SPINE W/O & W/DYE: CPT

## 2025-03-24 PROCEDURE — 82962 GLUCOSE BLOOD TEST: CPT

## 2025-03-24 PROCEDURE — 36415 COLL VENOUS BLD VENIPUNCTURE: CPT

## 2025-03-24 PROCEDURE — A9585 GADOBUTROL INJECTION: HCPCS

## 2025-03-24 PROCEDURE — 250N000013 HC RX MED GY IP 250 OP 250 PS 637: Performed by: HOSPITALIST

## 2025-03-24 PROCEDURE — 96376 TX/PRO/DX INJ SAME DRUG ADON: CPT

## 2025-03-24 PROCEDURE — 99285 EMERGENCY DEPT VISIT HI MDM: CPT | Mod: 25

## 2025-03-24 PROCEDURE — 72128 CT CHEST SPINE W/O DYE: CPT

## 2025-03-24 PROCEDURE — 72125 CT NECK SPINE W/O DYE: CPT

## 2025-03-24 PROCEDURE — G0378 HOSPITAL OBSERVATION PER HR: HCPCS

## 2025-03-24 PROCEDURE — 250N000011 HC RX IP 250 OP 636: Mod: JZ

## 2025-03-24 PROCEDURE — 81001 URINALYSIS AUTO W/SCOPE: CPT

## 2025-03-24 PROCEDURE — 80048 BASIC METABOLIC PNL TOTAL CA: CPT

## 2025-03-24 PROCEDURE — 96374 THER/PROPH/DIAG INJ IV PUSH: CPT

## 2025-03-24 RX ORDER — ONDANSETRON 4 MG/1
4 TABLET, ORALLY DISINTEGRATING ORAL EVERY 6 HOURS PRN
Status: DISCONTINUED | OUTPATIENT
Start: 2025-03-24 | End: 2025-04-01 | Stop reason: HOSPADM

## 2025-03-24 RX ORDER — ONDANSETRON 2 MG/ML
4 INJECTION INTRAMUSCULAR; INTRAVENOUS EVERY 6 HOURS PRN
Status: DISCONTINUED | OUTPATIENT
Start: 2025-03-24 | End: 2025-04-01 | Stop reason: HOSPADM

## 2025-03-24 RX ORDER — METHOCARBAMOL 500 MG/1
500 TABLET, FILM COATED ORAL 3 TIMES DAILY PRN
COMMUNITY

## 2025-03-24 RX ORDER — DEXTROSE MONOHYDRATE 25 G/50ML
25-50 INJECTION, SOLUTION INTRAVENOUS
Status: DISCONTINUED | OUTPATIENT
Start: 2025-03-24 | End: 2025-04-01 | Stop reason: HOSPADM

## 2025-03-24 RX ORDER — GLIPIZIDE 10 MG/1
10 TABLET, FILM COATED, EXTENDED RELEASE ORAL DAILY
COMMUNITY

## 2025-03-24 RX ORDER — GADOBUTROL 604.72 MG/ML
8 INJECTION INTRAVENOUS ONCE
Status: COMPLETED | OUTPATIENT
Start: 2025-03-24 | End: 2025-03-24

## 2025-03-24 RX ORDER — TRIAMCINOLONE ACETONIDE 1 MG/G
OINTMENT TOPICAL 2 TIMES DAILY PRN
COMMUNITY

## 2025-03-24 RX ORDER — POLYETHYLENE GLYCOL 3350 17 G/17G
17 POWDER, FOR SOLUTION ORAL DAILY PRN
Status: DISCONTINUED | OUTPATIENT
Start: 2025-03-24 | End: 2025-03-27

## 2025-03-24 RX ORDER — SOLIFENACIN SUCCINATE 5 MG/1
5 TABLET, FILM COATED ORAL DAILY
COMMUNITY

## 2025-03-24 RX ORDER — AMOXICILLIN 250 MG
1 CAPSULE ORAL 2 TIMES DAILY PRN
COMMUNITY

## 2025-03-24 RX ORDER — TRIAMTERENE AND HYDROCHLOROTHIAZIDE 37.5; 25 MG/1; MG/1
1 CAPSULE ORAL EVERY MORNING
Status: DISCONTINUED | OUTPATIENT
Start: 2025-03-25 | End: 2025-03-25

## 2025-03-24 RX ORDER — TOLTERODINE TARTRATE 1 MG/1
2 TABLET, EXTENDED RELEASE ORAL 2 TIMES DAILY
Status: DISCONTINUED | OUTPATIENT
Start: 2025-03-24 | End: 2025-04-01 | Stop reason: HOSPADM

## 2025-03-24 RX ORDER — POTASSIUM CHLORIDE 1500 MG/1
40 TABLET, EXTENDED RELEASE ORAL ONCE
Status: COMPLETED | OUTPATIENT
Start: 2025-03-24 | End: 2025-03-24

## 2025-03-24 RX ORDER — MIRABEGRON 25 MG/1
50 TABLET, FILM COATED, EXTENDED RELEASE ORAL DAILY
Status: DISCONTINUED | OUTPATIENT
Start: 2025-03-25 | End: 2025-04-01 | Stop reason: HOSPADM

## 2025-03-24 RX ORDER — LORAZEPAM 2 MG/ML
1 INJECTION INTRAMUSCULAR ONCE
Status: COMPLETED | OUTPATIENT
Start: 2025-03-24 | End: 2025-03-24

## 2025-03-24 RX ORDER — SEMAGLUTIDE 2.68 MG/ML
2 INJECTION, SOLUTION SUBCUTANEOUS
COMMUNITY

## 2025-03-24 RX ORDER — HYDROMORPHONE HYDROCHLORIDE 1 MG/ML
0.5 INJECTION, SOLUTION INTRAMUSCULAR; INTRAVENOUS; SUBCUTANEOUS ONCE
Status: COMPLETED | OUTPATIENT
Start: 2025-03-24 | End: 2025-03-24

## 2025-03-24 RX ORDER — PROCHLORPERAZINE MALEATE 5 MG/1
5 TABLET ORAL EVERY 6 HOURS PRN
Status: DISCONTINUED | OUTPATIENT
Start: 2025-03-24 | End: 2025-03-24

## 2025-03-24 RX ORDER — PREGABALIN 100 MG/1
100 CAPSULE ORAL 3 TIMES DAILY
Status: DISCONTINUED | OUTPATIENT
Start: 2025-03-24 | End: 2025-04-01 | Stop reason: HOSPADM

## 2025-03-24 RX ORDER — ROSUVASTATIN CALCIUM 10 MG/1
10 TABLET, COATED ORAL DAILY
Status: DISCONTINUED | OUTPATIENT
Start: 2025-03-25 | End: 2025-04-01 | Stop reason: HOSPADM

## 2025-03-24 RX ORDER — AMOXICILLIN 250 MG
2 CAPSULE ORAL 2 TIMES DAILY PRN
Status: DISCONTINUED | OUTPATIENT
Start: 2025-03-24 | End: 2025-03-27

## 2025-03-24 RX ORDER — FERROUS SULFATE 325(65) MG
325 TABLET ORAL 2 TIMES DAILY
Status: DISCONTINUED | OUTPATIENT
Start: 2025-03-24 | End: 2025-04-01 | Stop reason: HOSPADM

## 2025-03-24 RX ORDER — PANTOPRAZOLE SODIUM 40 MG/1
40 TABLET, DELAYED RELEASE ORAL DAILY
Status: DISCONTINUED | OUTPATIENT
Start: 2025-03-25 | End: 2025-04-01 | Stop reason: HOSPADM

## 2025-03-24 RX ORDER — ROSUVASTATIN CALCIUM 10 MG/1
1 TABLET, COATED ORAL DAILY
COMMUNITY

## 2025-03-24 RX ORDER — METHOCARBAMOL 750 MG/1
750 TABLET, FILM COATED ORAL 3 TIMES DAILY PRN
Status: DISCONTINUED | OUTPATIENT
Start: 2025-03-24 | End: 2025-04-01 | Stop reason: HOSPADM

## 2025-03-24 RX ORDER — LAMOTRIGINE 100 MG/1
100 TABLET ORAL 2 TIMES DAILY
Status: DISCONTINUED | OUTPATIENT
Start: 2025-03-24 | End: 2025-04-01 | Stop reason: HOSPADM

## 2025-03-24 RX ORDER — FERROUS SULFATE 325(65) MG
325 TABLET ORAL 2 TIMES DAILY
Status: ON HOLD | COMMUNITY
End: 2025-04-01

## 2025-03-24 RX ORDER — DIAZEPAM 2 MG/1
2 TABLET ORAL 2 TIMES DAILY PRN
COMMUNITY
End: 2025-04-01

## 2025-03-24 RX ORDER — BUSPIRONE HYDROCHLORIDE 7.5 MG/1
7.5 TABLET ORAL 2 TIMES DAILY
Status: DISCONTINUED | OUTPATIENT
Start: 2025-03-24 | End: 2025-04-01 | Stop reason: HOSPADM

## 2025-03-24 RX ORDER — NICOTINE POLACRILEX 4 MG
15-30 LOZENGE BUCCAL
Status: DISCONTINUED | OUTPATIENT
Start: 2025-03-24 | End: 2025-04-01 | Stop reason: HOSPADM

## 2025-03-24 RX ORDER — POTASSIUM CHLORIDE 1.5 G/1.58G
40 POWDER, FOR SOLUTION ORAL ONCE
Status: COMPLETED | OUTPATIENT
Start: 2025-03-24 | End: 2025-03-24

## 2025-03-24 RX ORDER — IODINE/SODIUM IODIDE 2 %
TINCTURE TOPICAL
COMMUNITY
End: 2025-04-02

## 2025-03-24 RX ORDER — HYDROXYZINE HYDROCHLORIDE 25 MG/1
25 TABLET, FILM COATED ORAL 2 TIMES DAILY
Status: DISCONTINUED | OUTPATIENT
Start: 2025-03-24 | End: 2025-04-01 | Stop reason: HOSPADM

## 2025-03-24 RX ORDER — AMOXICILLIN 250 MG
1 CAPSULE ORAL 2 TIMES DAILY PRN
Status: DISCONTINUED | OUTPATIENT
Start: 2025-03-24 | End: 2025-03-27

## 2025-03-24 RX ORDER — OXYCODONE HYDROCHLORIDE 5 MG/1
5 TABLET ORAL EVERY 6 HOURS PRN
Status: DISCONTINUED | OUTPATIENT
Start: 2025-03-24 | End: 2025-03-25

## 2025-03-24 RX ORDER — DULOXETIN HYDROCHLORIDE 60 MG/1
60 CAPSULE, DELAYED RELEASE ORAL EVERY MORNING
Status: DISCONTINUED | OUTPATIENT
Start: 2025-03-25 | End: 2025-04-01 | Stop reason: HOSPADM

## 2025-03-24 RX ORDER — SENNOSIDES 8.6 MG
8.6 TABLET ORAL 2 TIMES DAILY
Status: DISCONTINUED | OUTPATIENT
Start: 2025-03-24 | End: 2025-04-01 | Stop reason: HOSPADM

## 2025-03-24 RX ORDER — POLYETHYLENE GLYCOL 3350 17 G/17G
1 POWDER, FOR SOLUTION ORAL DAILY PRN
COMMUNITY

## 2025-03-24 RX ORDER — ACETAMINOPHEN 325 MG/1
650 TABLET ORAL EVERY 6 HOURS PRN
Status: DISCONTINUED | OUTPATIENT
Start: 2025-03-24 | End: 2025-03-25

## 2025-03-24 RX ORDER — OXYCODONE HYDROCHLORIDE 5 MG/1
10 TABLET ORAL EVERY 6 HOURS PRN
Status: DISCONTINUED | OUTPATIENT
Start: 2025-03-24 | End: 2025-03-25

## 2025-03-24 RX ADMIN — HYDROXYZINE HYDROCHLORIDE 25 MG: 25 TABLET ORAL at 22:31

## 2025-03-24 RX ADMIN — TOLTERODINE TARTRATE 2 MG: 1 TABLET ORAL at 22:30

## 2025-03-24 RX ADMIN — HYDROMORPHONE HYDROCHLORIDE 0.5 MG: 1 INJECTION, SOLUTION INTRAMUSCULAR; INTRAVENOUS; SUBCUTANEOUS at 16:49

## 2025-03-24 RX ADMIN — LORAZEPAM 1 MG: 2 INJECTION INTRAMUSCULAR; INTRAVENOUS at 16:58

## 2025-03-24 RX ADMIN — BUSPIRONE HYDROCHLORIDE 7.5 MG: 7.5 TABLET ORAL at 22:31

## 2025-03-24 RX ADMIN — GADOBUTROL 8 ML: 604.72 INJECTION INTRAVENOUS at 17:34

## 2025-03-24 RX ADMIN — OXYCODONE 10 MG: 5 TABLET ORAL at 22:31

## 2025-03-24 RX ADMIN — PREGABALIN 100 MG: 100 CAPSULE ORAL at 22:28

## 2025-03-24 RX ADMIN — POTASSIUM CHLORIDE 40 MEQ: 1500 TABLET, EXTENDED RELEASE ORAL at 22:29

## 2025-03-24 RX ADMIN — HYDROMORPHONE HYDROCHLORIDE 0.5 MG: 1 INJECTION, SOLUTION INTRAMUSCULAR; INTRAVENOUS; SUBCUTANEOUS at 16:16

## 2025-03-24 RX ADMIN — SODIUM CHLORIDE 1000 ML: 0.9 INJECTION, SOLUTION INTRAVENOUS at 14:58

## 2025-03-24 RX ADMIN — LAMOTRIGINE 100 MG: 100 TABLET ORAL at 22:29

## 2025-03-24 RX ADMIN — FERROUS SULFATE TAB 325 MG (65 MG ELEMENTAL FE) 325 MG: 325 (65 FE) TAB at 22:29

## 2025-03-24 RX ADMIN — HYDROMORPHONE HYDROCHLORIDE 0.5 MG: 1 INJECTION, SOLUTION INTRAMUSCULAR; INTRAVENOUS; SUBCUTANEOUS at 14:59

## 2025-03-24 ASSESSMENT — ACTIVITIES OF DAILY LIVING (ADL)
ADLS_ACUITY_SCORE: 59

## 2025-03-24 NOTE — ED PROVIDER NOTES
Emergency Department Midlevel Supervisory Note     I had a face to face encounter with this patient seen by the Advanced Practice Provider (WALT). I personally made/approved the management plan and take responsibility for the patient management. I personally saw patient and performed a substantive portion of the visit including all aspects of the medical decision making.     ED Course:  2:37 PM Shay Arana PA-C staffed patient with me. I agree with their assessment and plan of management, and I will see the patient.  I met with the patient to introduce myself, gather additional history, perform my initial exam, and discuss the plan.       MDM:  ED Course as of 03/24/25 1846   Mon Mar 24, 2025   1425 WALT supervisory note: fell at 9AM while transferring. Fell on buttocks. Took 1.5 hours to get to phone. Worsening of pain/weakness in thighs bilaterally   1630 CT Lumbar Spine w/o Contrast  CERVICAL SPINE CT:  1.  No acute fracture or posttraumatic subluxation.  2.  No high-grade spinal canal stenosis. Severe neural foraminal stenosis on the right at C5-C6.     THORACIC SPINE CT:  1.  No acute fracture or posttraumatic subluxation.  2.  No high-grade spinal canal or neural foraminal stenosis.     LUMBAR SPINE CT:  1.  No acute fracture or posttraumatic subluxation.  2.  Moderate spinal canal stenosis at L2-L3, L3-L4, and L4-L5.  3.  Severe neural foraminal stenosis on the left at L5-S1.           No diagnosis found.    Brief HPI:     Jumana Yang is a 84 year old female who presents for evaluation of fall.  Patient was transferring herself off her toilet this morning around 9 AM when she felt her legs give out.  She fell directly on her coccyx area.  She denies hitting her head, loss of consciousness, vision changes, headache, or blood thinner use.She has had increased flares of bilateral leg weakness causing falls about 5 times in the past year.  She sustained some bruising on her arms bilaterally from crawling on  "her floor, but she denies any deformities or bony tenderness that feels like a fracture.  No numbness or tingling in her extremities.  She has taken her oxycodone this morning, but she has been unable to take any of her other meds. She denies any chest pain, shortness of breath, abdominal pain, dysuria, fevers.      Brief Physical Exam: /72   Pulse 78   Temp 97.5  F (36.4  C) (Oral)   Resp 18   Ht 1.651 m (5' 5\")   LMP  (LMP Unknown)   SpO2 (!) 91%   BMI 29.62 kg/m    Constitutional: No distress, very hard of hearing  EYES: Conjunctivae clear  HENT:  Atraumatic  Respiratory:  Respirations even, unlabored, in no acute respiratory distress  Cardiovascular:  Regular rate and rhythm, good peripheral perfusion  Musculoskeletal: Tenderness to light touch on the C, T, L-spine.  Decreased strength with bilateral thigh flexion and extension, but also significant amount of pain restricting effort.  Integument: Warm & dry. No appreciable rash, erythema.  Neurologic: Hard of hearing    Labs and Imaging:  Results for orders placed or performed during the hospital encounter of 03/24/25   MR Lumbar Spine w/o & w Contrast    Impression    IMPRESSION:  1.  At L2-L3, severe spinal canal stenosis and mild bilateral neural foraminal stenosis.  2.  At L3-L4, moderate to severe spinal canal stenosis with asymmetric narrowing of the right lateral recess. Mild to moderate right and mild left neural foraminal stenosis.  3.  At L4-L5, moderate to severe spinal canal stenosis with mild right and mild to moderate left neural foraminal stenosis.  4.  At L5-S1, mild spinal canal stenosis with asymmetric right lateral recess stenosis. Moderate right and severe left neural foraminal stenosis with impingement upon the left L5 nerve root.     CT Lumbar Spine w/o Contrast    Impression    IMPRESSION:  CERVICAL SPINE CT:  1.  No acute fracture or posttraumatic subluxation.  2.  No high-grade spinal canal stenosis. Severe neural foraminal " stenosis on the right at C5-C6.    THORACIC SPINE CT:  1.  No acute fracture or posttraumatic subluxation.  2.  No high-grade spinal canal or neural foraminal stenosis.    LUMBAR SPINE CT:  1.  No acute fracture or posttraumatic subluxation.  2.  Moderate spinal canal stenosis at L2-L3, L3-L4, and L4-L5.  3.  Severe neural foraminal stenosis on the left at L5-S1.     CT Thoracic Spine w/o Contrast    Impression    IMPRESSION:  CERVICAL SPINE CT:  1.  No acute fracture or posttraumatic subluxation.  2.  No high-grade spinal canal stenosis. Severe neural foraminal stenosis on the right at C5-C6.    THORACIC SPINE CT:  1.  No acute fracture or posttraumatic subluxation.  2.  No high-grade spinal canal or neural foraminal stenosis.    LUMBAR SPINE CT:  1.  No acute fracture or posttraumatic subluxation.  2.  Moderate spinal canal stenosis at L2-L3, L3-L4, and L4-L5.  3.  Severe neural foraminal stenosis on the left at L5-S1.     CT Cervical Spine w/o Contrast    Impression    IMPRESSION:  CERVICAL SPINE CT:  1.  No acute fracture or posttraumatic subluxation.  2.  No high-grade spinal canal stenosis. Severe neural foraminal stenosis on the right at C5-C6.    THORACIC SPINE CT:  1.  No acute fracture or posttraumatic subluxation.  2.  No high-grade spinal canal or neural foraminal stenosis.    LUMBAR SPINE CT:  1.  No acute fracture or posttraumatic subluxation.  2.  Moderate spinal canal stenosis at L2-L3, L3-L4, and L4-L5.  3.  Severe neural foraminal stenosis on the left at L5-S1.     Glucose by meter   Result Value Ref Range    GLUCOSE BY METER POCT 159 (H) 70 - 99 mg/dL   Basic metabolic panel   Result Value Ref Range    Sodium 143 135 - 145 mmol/L    Potassium 3.3 (L) 3.4 - 5.3 mmol/L    Chloride 100 98 - 107 mmol/L    Carbon Dioxide (CO2) 29 22 - 29 mmol/L    Anion Gap 14 7 - 15 mmol/L    Urea Nitrogen 16.3 8.0 - 23.0 mg/dL    Creatinine 1.07 (H) 0.51 - 0.95 mg/dL    GFR Estimate 52 (L) >60 mL/min/1.73m2     Calcium 9.5 8.8 - 10.4 mg/dL    Glucose 129 (H) 70 - 99 mg/dL   Result Value Ref Range     (H) 26 - 192 U/L   CBC with platelets and differential   Result Value Ref Range    WBC Count 11.5 (H) 4.0 - 11.0 10e3/uL    RBC Count 4.65 3.80 - 5.20 10e6/uL    Hemoglobin 13.7 11.7 - 15.7 g/dL    Hematocrit 42.5 35.0 - 47.0 %    MCV 91 78 - 100 fL    MCH 29.5 26.5 - 33.0 pg    MCHC 32.2 31.5 - 36.5 g/dL    RDW 14.3 10.0 - 15.0 %    Platelet Count 208 150 - 450 10e3/uL    % Neutrophils 63 %    % Lymphocytes 25 %    % Monocytes 8 %    % Eosinophils 3 %    % Basophils 1 %    % Immature Granulocytes 0 %    NRBCs per 100 WBC 0 <1 /100    Absolute Neutrophils 7.2 1.6 - 8.3 10e3/uL    Absolute Lymphocytes 2.9 0.8 - 5.3 10e3/uL    Absolute Monocytes 1.0 0.0 - 1.3 10e3/uL    Absolute Eosinophils 0.4 0.0 - 0.7 10e3/uL    Absolute Basophils 0.1 0.0 - 0.2 10e3/uL    Absolute Immature Granulocytes 0.1 <=0.4 10e3/uL    Absolute NRBCs 0.0 10e3/uL   Extra Blue Top Tube   Result Value Ref Range    Hold Specimen JIC        I have reviewed the relevant laboratory studies above.    Any images independently reviewed are listed in the medical decision making    EKG: I reviewed and independently interpreted the patient's EKG, with comments/interpretation noted in the MDM    Procedures:  I was present for the key portions of procedures documented in WALT/midlevel note, see midlevel note for further details.    Shamir Evans MD   [facility] EMERGENCY ROOM  [address] [facility] [id number] [phone]       Shamir Evans MD  03/24/25 1920

## 2025-03-24 NOTE — ED TRIAGE NOTES
"PT states she fell at home today when attempting to get up from the toilet. She states she has spinal stenosis and due to that her legs got weak and gave out. She states she has pain \"all over my body\" due to crawling [place] the [place] to get to the phone. PT has a skin tear on her right elbow.     PT denies hitting her head and is not on blood thinners ,     Triage Assessment (Adult)       Row Name 03/24/25 1238          Triage Assessment    Airway WDL WDL        Respiratory WDL    Respiratory WDL WDL        Peripheral/Neurovascular WDL    Peripheral Neurovascular WDL WDL        Cognitive/Neuro/Behavioral WDL    Cognitive/Neuro/Behavioral WDL WDL                     "

## 2025-03-24 NOTE — ED PROVIDER NOTES
Emergency Department Encounter   NAME: Jumana Yang ; AGE: 84 year old female ; YOB: 1940 ; MRN: [id number] ; PCP: Terra Mathews   ED PROVIDER: Shay Arana PA-C    Evaluation Date & Time:   3/24/2025  1:49 PM    CHIEF COMPLAINT:  Fall and Elbow Injury      Impression and Plan   MDM: Jmuana Yang is a 84 year old female with a pertinent history of spinal stenosis, chronic pain, T2DM, CKD, sensorineural hearing loss, lower extremity edema, cauda equina compression who presents to the ED for evaluation of fall.  Patient was transferring herself off her toilet this morning around 9 AM when she felt her legs give out.  She fell directly on her coccyx area.  She denies hitting her head, loss of consciousness, vision changes, headache, or blood thinner use.  She was too weak to get up, so she crawled to the living room over the course of about an hour and a half.  She was then able to call her PCA, and her son got her out of her apartment.  She has had increased flares of bilateral leg weakness causing falls about 5 times in the past year.  Over the course of the last week, she has gotten progressively weaker.  She previously was walking with a walker with assistance, but today after her fall, she is barely able to move either of her legs.  She sustained some bruising on her arms bilaterally from crawling on her floor, but she denies any deformities or bony tenderness and has full ROM and sensation of BUE. No numbness or tingling in her extremities.  She has taken her oxycodone this morning, but she has been unable to take any of her other meds.     Vitals reviewed and are stable. On exam patient has tenderness to palpation of her lumbar, thoracic, and cervical spine. Patient able to move feet but is having significant weakness with quad extension and hip flexion bilaterally. Sensation is intact bilaterally. Tenderness to palpation of her R lateral thigh. Patient has full ROM in BUE and  strength is  equal bilaterally. There is bruising on her R elbow but no obvious deformity. Patient is extremely hard of hearing. Her abdomen is distended, but he states this is her baseline. Mild BLE edema, also at baseline per patient.     CT cervical, thoracic, lumbar spine initially negative for any acute fracture.  It does show evidence of severe neural foraminal stenosis on the left at L5-S1 and at C5-C6, but this seems consistent with her Lumbar spine MRI from 9/25/24. Since patient is very confident that she did not hit her head, she had no LOC, headache, vision changes, or any history of blood thinner use, CT head is not necessary today. Patient had increase in anxiety on the way to MRI, so she was given Ativan.  MRI generalized spinal stenosis with L5 nerve root impingement.  This is consistent with her baseline.    CK mildly elevated at 237, but this is not significant enough to suggest rhabdomyolysis.  No significant leukocytosis, as her white count is 11.5.  Potassium mildly low at 3.3, so this was replaced orally. UA negative for infection that could have caused her increased weakness.     I informed the patient of these findings and that her symptoms are most likely related to musculoskeletal pain secondary to her fall caused by a flare of her known spinal stenosis. Her imaging shows no acute fracture or other cause for her weakness. We discussed that she is not safe to return home, as she cannot stand or walk.  She agrees with this. I spoke to Dr. Stearns with hospital medicine who recommends admission to observation for pain control, and plan for neurosurgery to follow with her in the morning.       ED COURSE:  2:30 PM I met and introduced myself to the patient. I gathered initial history and performed my physical exam. We discussed plan for initial workup.     I have staffed the patient with Dr. Evans ED MD, who has evaluated the patient and agrees with all aspects of today's care.     At the conclusion of  the encounter I discussed the results of all the tests and the disposition. The questions were answered. The patient or family acknowledged understanding and was agreeable with the care plan.    Medical Decision Making  Obtained supplemental history:Supplemental history obtained?: Caregiver  Reviewed external records: External records reviewed?: Prior Imaging: MRI lumbar spine from 9/25/24  Care impacted by chronic illness:Chronic Kidney Disease, Diabetes, and Mental Health  Did you consider but not order tests?: Work up considered but not performed and documented in chart, if applicable  Did you interpret images independently?: Independent interpretation of ECG and images noted in documentation, when applicable.  Consultation discussion with other provider:Did you involve another provider (consultant, , pharmacy, etc.)?: No  Admit.    MIPS: Not Applicable    FINAL IMPRESSION:    ICD-10-CM    1. Weakness of both lower extremities  R29.898       2. Spinal stenosis of lumbar region, unspecified whether neurogenic claudication present  M48.061             MEDICATIONS GIVEN IN THE EMERGENCY DEPARTMENT:  Medications   potassium chloride (KLOR-CON) Packet 40 mEq (has no administration in time range)   HYDROmorphone (PF) (DILAUDID) injection 0.5 mg (0.5 mg Intravenous $Given 3/24/25 1459)   sodium chloride 0.9% BOLUS 1,000 mL (1,000 mLs Intravenous $New Bag 3/24/25 1458)   HYDROmorphone (PF) (DILAUDID) injection 0.5 mg (0.5 mg Intravenous $Given 3/24/25 1616)   HYDROmorphone (PF) (DILAUDID) injection 0.5 mg (0.5 mg Intravenous $Given 3/24/25 1649)   LORazepam (ATIVAN) injection 1 mg (1 mg Intravenous $Given 3/24/25 1658)   gadobutrol (GADAVIST) injection 8 mL (8 mLs Intravenous $Given 3/24/25 1734)         NEW PRESCRIPTIONS STARTED AT TODAY'S ED VISIT:  New Prescriptions    No medications on file         HPI   Use of Intrepreter: N/A     Jumana Yang is a 84 year old female with a pertinent history of spinal stenosis,  chronic pain, T2DM, CKD, sensorineural hearing loss, lower extremity edema, cauda equina compression who presents to the ED for evaluation of fall.  Patient was transferring herself off her toilet this morning around 9 AM when she felt her legs give out.  She fell directly on her coccyx area.  She denies hitting her head, loss of consciousness, vision changes, headache, or blood thinner use.  She was too weak to get up, so she crawled to the living room over the course of about an hour and a half.  She was then able to call her PCA, and her son got her out of her apartment.  She has had increased flares of bilateral leg weakness causing falls about 5 times in the past year.  Over the course of the last week, she has gotten progressively weaker.  She previously was walking with a walker with assistance, but today after her fall, she is barely able to move either of her legs.  She sustained some bruising on her arms bilaterally from crawling on her floor, but she denies any deformities or bony tenderness and has full ROM and sensation of BUE. No numbness or tingling in her extremities.  She has taken her oxycodone this morning, but she has been unable to take any of her other meds.     She denies any chest pain, shortness of breath, abdominal pain, dysuria, fevers.      REVIEW OF SYSTEMS:  Pertinent positive and negative symptoms per HPI.       Medical History     Past Medical History:   Diagnosis Date    Abdominal pain     Anxiety     Arthritis     Asthma     Bipolar 1 disorder (H)     Chronic pain     Cochlear hydrops 01/01/1988    COPD (chronic obstructive pulmonary disease) (H)     Depression     Diabetes mellitus (H)     Dyspepsia     GERD (gastroesophageal reflux disease)     Hard of hearing     Hepatic abscess     Hyperlipidemia     Hypertension     Hypothyroidism     Irritable bowel syndrome     Meniere disease     Neuropathy     Noninfectious ileitis     Peritoneal abscess (H)     Spinal stenosis of lumbar  region     Steroid long-term use     Vaginitis, atrophic, postmenopausal     Vitamin D deficiency        Past Surgical History:   Procedure Laterality Date    CATARACT IOL, RT/LT Left 2013    FOOT SURGERY      toe    GI SURGERY      IR LUMBAR/SACRAL TRANSFOR INJ BILATERAL Bilateral 3/11/2022    LAPAROSCOPIC HEPATECTOMY PARTIAL  2013    Procedure: LAPAROSCOPIC HEPATECTOMY PARTIAL;  Laparoscopic Debridement of Liver Abcess;  Surgeon: Sepideh Green MD;  Location: [facility] OR    ORTHOPEDIC SURGERY      PICC INSERTION  2013    5fr DL Power PICC, 41cm (1cm external length) in the R lateral brachial vein with tip in the SVC RA junction.    RECTAL SURGERY      1970s    RELEASE CARPAL TUNNEL Left 3/21/2023    Procedure: LEFT OPEN CARPAL TUNNEL RELEASE. LEFT RING FINGER A1 PULLEY RELEASE;  Surgeon: Claire Hamilton MD;  Location: [facility] OR    VASCULAR SURGERY         Family History   Problem Relation Age of Onset    Cerebrovascular Disease Mother     Heart Disease Mother     Heart Disease Father     Heart Disease Brother     Diabetes No family hx of     Coronary Artery Disease No family hx of     Hypertension No family hx of     Hyperlipidemia No family hx of     Breast Cancer No family hx of     Colon Cancer No family hx of     Prostate Cancer No family hx of     Other Cancer No family hx of     Depression No family hx of     Anxiety Disorder No family hx of     Mental Illness No family hx of     Substance Abuse No family hx of     Anesthesia Reaction No family hx of     Asthma No family hx of     Osteoporosis No family hx of     Genetic Disorder No family hx of     Thyroid Disease No family hx of     Obesity No family hx of     Unknown/Adopted No family hx of        Social History     Tobacco Use    Smoking status: Former     Current packs/day: 0.00     Types: Cigarettes     Quit date: 11/15/1987     Years since quittin.3    Smokeless tobacco: Former   Substance Use Topics    Alcohol use: Not  "Currently     Alcohol/week: 0.0 standard drinks of alcohol     Comment: MALISSA christopher since 9/131986    Drug use: No     Comment: used emma in the past       acetaminophen (TYLENOL) 325 MG tablet  benzonatate (TESSALON) 100 MG capsule  blood glucose (ACCU-CHEK FASTCLIX) lancing device  blood glucose (CONTOUR TEST) test strip  busPIRone (BUSPAR) 7.5 MG tablet  CONTOUR NEXT TEST test strip  diazepam (VALIUM) 2 MG tablet  DULoxetine (CYMBALTA) 60 MG capsule  estradiol (ESTRACE) 0.1 MG/GM vaginal cream  glipiZIDE (GLUCOTROL) 10 MG tablet  glipiZIDE (GLUCOTROL) 5 MG tablet  hydrocortisone (CORTAID) 1 % external cream  hydrOXYzine (ATARAX) 25 MG tablet  lamoTRIgine (LAMICTAL) 100 MG tablet  melatonin 3 MG tablet  metFORMIN (GLUCOPHAGE XR) 500 MG 24 hr tablet  miconazole (MICATIN) 2 % external powder  mirabegron (MYRBETRIQ) 25 MG 24 hr tablet  mirabegron (MYRBETRIQ) 50 MG 24 hr tablet  omeprazole (PRILOSEC) 20 MG DR capsule  oxyCODONE-acetaminophen (PERCOCET) 5-325 MG tablet  phenazopyridine (PYRIDIUM) 200 MG tablet  polyethylene glycol (MIRALAX) 17 GM/Dose powder  predniSONE (DELTASONE) 1 MG tablet  predniSONE (DELTASONE) 5 MG tablet  pregabalin (LYRICA) 100 MG capsule  triamterene-HCTZ (DYAZIDE) 37.5-25 MG capsule  trospium (SANCTURA) 20 MG tablet  trospium (SANCTURA) 20 MG tablet          Physical Exam     First Vitals:  Patient Vitals for the past 24 hrs:   BP Temp Temp src Pulse Resp SpO2 Height   03/24/25 1655 -- -- -- 78 -- (!) 91 % --   03/24/25 1434 -- -- -- 85 18 97 % --   03/24/25 1430 128/72 -- -- 85 -- (!) 91 % --   03/24/25 1235 (!) 140/63 97.5  F (36.4  C) Oral 93 17 94 % 1.651 m (5' 5\")         PHYSICAL EXAM:   Physical Exam  Vitals reviewed.   Constitutional:       General: She is not in acute distress.     Appearance: Normal appearance. She is not diaphoretic.   HENT:      Head: Atraumatic.      Mouth/Throat:      Mouth: Mucous membranes are moist.   Eyes:      General: No scleral icterus.     " Conjunctiva/sclera: Conjunctivae normal.   Cardiovascular:      Rate and Rhythm: Normal rate.      Heart sounds: Normal heart sounds.   Pulmonary:      Effort: No respiratory distress.      Breath sounds: Normal breath sounds.   Abdominal:      General: There is distension (abdomen is distended, but patient states this is her baseline).      Tenderness: There is no abdominal tenderness.   Musculoskeletal:         General: Tenderness present.      Cervical back: Neck supple.      Right lower leg: Edema present.      Left lower leg: Edema present.      Comments: Tenderness to palpation of her lumbar, thoracic, and cervical spine. Patient able to move feet but is having significant weakness with quad extension and hip flexion bilaterally. Sensation is intact bilaterally. Tenderness to palpation of her R lateral thigh.   Patient has full ROM in BUE   Skin:     General: Skin is warm.      Findings: Bruising (bruising on R elbow) present. No rash.   Neurological:      General: No focal deficit present.      Mental Status: She is alert and oriented to person, place, and time.      Cranial Nerves: No cranial nerve deficit.      Sensory: No sensory deficit.      Motor: Weakness (BLE weakness) present.   Psychiatric:         Mood and Affect: Mood normal.             Results     LAB:  All pertinent labs reviewed and interpreted  Labs Ordered and Resulted from Time of ED Arrival to Time of ED Departure   GLUCOSE BY METER - Abnormal       Result Value    GLUCOSE BY METER POCT 159 (*)    BASIC METABOLIC PANEL - Abnormal    Sodium 143      Potassium 3.3 (*)     Chloride 100      Carbon Dioxide (CO2) 29      Anion Gap 14      Urea Nitrogen 16.3      Creatinine 1.07 (*)     GFR Estimate 52 (*)     Calcium 9.5      Glucose 129 (*)    CK TOTAL - Abnormal     (*)    ROUTINE UA WITH MICROSCOPIC REFLEX TO CULTURE - Abnormal    Color Urine Yellow      Appearance Urine Clear      Glucose Urine Negative      Bilirubin Urine Negative       Ketones Urine Negative      Specific Gravity Urine 1.032 (*)     Blood Urine 0.06 mg/dL (*)     pH Urine 5.5      Protein Albumin Urine 30 (*)     Urobilinogen Urine Normal      Nitrite Urine Negative      Leukocyte Esterase Urine Negative      Mucus Urine Present (*)     RBC Urine 1      WBC Urine 2      Squamous Epithelials Urine <1     CBC WITH PLATELETS AND DIFFERENTIAL - Abnormal    WBC Count 11.5 (*)     RBC Count 4.65      Hemoglobin 13.7      Hematocrit 42.5      MCV 91      MCH 29.5      MCHC 32.2      RDW 14.3      Platelet Count 208      % Neutrophils 63      % Lymphocytes 25      % Monocytes 8      % Eosinophils 3      % Basophils 1      % Immature Granulocytes 0      NRBCs per 100 WBC 0      Absolute Neutrophils 7.2      Absolute Lymphocytes 2.9      Absolute Monocytes 1.0      Absolute Eosinophils 0.4      Absolute Basophils 0.1      Absolute Immature Granulocytes 0.1      Absolute NRBCs 0.0     GLUCOSE BY METER - Abnormal    GLUCOSE BY METER POCT 106 (*)        RADIOLOGY:  MR Lumbar Spine w/o & w Contrast   Final Result   IMPRESSION:   1.  At L2-L3, severe spinal canal stenosis and mild bilateral neural foraminal stenosis.   2.  At L3-L4, moderate to severe spinal canal stenosis with asymmetric narrowing of the right lateral recess. Mild to moderate right and mild left neural foraminal stenosis.   3.  At L4-L5, moderate to severe spinal canal stenosis with mild right and mild to moderate left neural foraminal stenosis.   4.  At L5-S1, mild spinal canal stenosis with asymmetric right lateral recess stenosis. Moderate right and severe left neural foraminal stenosis with impingement upon the left L5 nerve root.         CT Lumbar Spine w/o Contrast   Final Result   IMPRESSION:   CERVICAL SPINE CT:   1.  No acute fracture or posttraumatic subluxation.   2.  No high-grade spinal canal stenosis. Severe neural foraminal stenosis on the right at C5-C6.      THORACIC SPINE CT:   1.  No acute fracture or  posttraumatic subluxation.   2.  No high-grade spinal canal or neural foraminal stenosis.      LUMBAR SPINE CT:   1.  No acute fracture or posttraumatic subluxation.   2.  Moderate spinal canal stenosis at L2-L3, L3-L4, and L4-L5.   3.  Severe neural foraminal stenosis on the left at L5-S1.         CT Thoracic Spine w/o Contrast   Final Result   IMPRESSION:   CERVICAL SPINE CT:   1.  No acute fracture or posttraumatic subluxation.   2.  No high-grade spinal canal stenosis. Severe neural foraminal stenosis on the right at C5-C6.      THORACIC SPINE CT:   1.  No acute fracture or posttraumatic subluxation.   2.  No high-grade spinal canal or neural foraminal stenosis.      LUMBAR SPINE CT:   1.  No acute fracture or posttraumatic subluxation.   2.  Moderate spinal canal stenosis at L2-L3, L3-L4, and L4-L5.   3.  Severe neural foraminal stenosis on the left at L5-S1.         CT Cervical Spine w/o Contrast   Final Result   IMPRESSION:   CERVICAL SPINE CT:   1.  No acute fracture or posttraumatic subluxation.   2.  No high-grade spinal canal stenosis. Severe neural foraminal stenosis on the right at C5-C6.      THORACIC SPINE CT:   1.  No acute fracture or posttraumatic subluxation.   2.  No high-grade spinal canal or neural foraminal stenosis.      LUMBAR SPINE CT:   1.  No acute fracture or posttraumatic subluxation.   2.  Moderate spinal canal stenosis at L2-L3, L3-L4, and L4-L5.   3.  Severe neural foraminal stenosis on the left at L5-S1.               PROCEDURES:  none      Shay Arana PA-C   Emergency Medicine   [facility] EMERGENCY ROOM        Shay Arana PA-C  03/24/25 1957

## 2025-03-25 ENCOUNTER — APPOINTMENT (OUTPATIENT)
Dept: RADIOLOGY | Facility: CLINIC | Age: 85
DRG: 552 | End: 2025-03-25
Payer: MEDICARE

## 2025-03-25 LAB
ANION GAP SERPL CALCULATED.3IONS-SCNC: 11 MMOL/L (ref 7–15)
BUN SERPL-MCNC: 14.6 MG/DL (ref 8–23)
CALCIUM SERPL-MCNC: 9.1 MG/DL (ref 8.8–10.4)
CHLORIDE SERPL-SCNC: 102 MMOL/L (ref 98–107)
CK SERPL-CCNC: 217 U/L (ref 26–192)
CREAT SERPL-MCNC: 1.01 MG/DL (ref 0.51–0.95)
EGFRCR SERPLBLD CKD-EPI 2021: 55 ML/MIN/1.73M2
GLUCOSE BLDC GLUCOMTR-MCNC: 157 MG/DL (ref 70–99)
GLUCOSE BLDC GLUCOMTR-MCNC: 188 MG/DL (ref 70–99)
GLUCOSE BLDC GLUCOMTR-MCNC: 199 MG/DL (ref 70–99)
GLUCOSE BLDC GLUCOMTR-MCNC: 215 MG/DL (ref 70–99)
GLUCOSE BLDC GLUCOMTR-MCNC: 277 MG/DL (ref 70–99)
GLUCOSE SERPL-MCNC: 187 MG/DL (ref 70–99)
HCO3 SERPL-SCNC: 28 MMOL/L (ref 22–29)
POTASSIUM SERPL-SCNC: 4 MMOL/L (ref 3.4–5.3)
SODIUM SERPL-SCNC: 141 MMOL/L (ref 135–145)

## 2025-03-25 PROCEDURE — 250N000012 HC RX MED GY IP 250 OP 636 PS 637: Performed by: HOSPITALIST

## 2025-03-25 PROCEDURE — G0378 HOSPITAL OBSERVATION PER HR: HCPCS

## 2025-03-25 PROCEDURE — 82565 ASSAY OF CREATININE: CPT | Performed by: HOSPITALIST

## 2025-03-25 PROCEDURE — 250N000013 HC RX MED GY IP 250 OP 250 PS 637: Performed by: HOSPITALIST

## 2025-03-25 PROCEDURE — 82962 GLUCOSE BLOOD TEST: CPT

## 2025-03-25 PROCEDURE — 99222 1ST HOSP IP/OBS MODERATE 55: CPT

## 2025-03-25 PROCEDURE — 82435 ASSAY OF BLOOD CHLORIDE: CPT | Performed by: HOSPITALIST

## 2025-03-25 PROCEDURE — 96375 TX/PRO/DX INJ NEW DRUG ADDON: CPT

## 2025-03-25 PROCEDURE — 99232 SBSQ HOSP IP/OBS MODERATE 35: CPT | Performed by: HOSPITALIST

## 2025-03-25 PROCEDURE — 72120 X-RAY BEND ONLY L-S SPINE: CPT

## 2025-03-25 PROCEDURE — 250N000011 HC RX IP 250 OP 636: Performed by: INTERNAL MEDICINE

## 2025-03-25 PROCEDURE — G0463 HOSPITAL OUTPT CLINIC VISIT: HCPCS

## 2025-03-25 PROCEDURE — 120N000001 HC R&B MED SURG/OB

## 2025-03-25 PROCEDURE — 36415 COLL VENOUS BLD VENIPUNCTURE: CPT | Performed by: HOSPITALIST

## 2025-03-25 PROCEDURE — 82550 ASSAY OF CK (CPK): CPT | Performed by: HOSPITALIST

## 2025-03-25 RX ORDER — HYDROXYZINE HYDROCHLORIDE 10 MG/1
10 TABLET, FILM COATED ORAL 2 TIMES DAILY PRN
Status: DISCONTINUED | OUTPATIENT
Start: 2025-03-25 | End: 2025-04-01 | Stop reason: HOSPADM

## 2025-03-25 RX ORDER — OXYCODONE HYDROCHLORIDE 5 MG/1
10 TABLET ORAL EVERY 4 HOURS PRN
Status: DISCONTINUED | OUTPATIENT
Start: 2025-03-25 | End: 2025-04-01 | Stop reason: HOSPADM

## 2025-03-25 RX ORDER — MORPHINE SULFATE 2 MG/ML
2 INJECTION, SOLUTION INTRAMUSCULAR; INTRAVENOUS ONCE
Status: COMPLETED | OUTPATIENT
Start: 2025-03-25 | End: 2025-03-25

## 2025-03-25 RX ORDER — OXYCODONE HYDROCHLORIDE 5 MG/1
5 TABLET ORAL EVERY 4 HOURS PRN
Status: DISCONTINUED | OUTPATIENT
Start: 2025-03-25 | End: 2025-04-01 | Stop reason: HOSPADM

## 2025-03-25 RX ORDER — TRIAMTERENE AND HYDROCHLOROTHIAZIDE 37.5; 25 MG/1; MG/1
1 CAPSULE ORAL EVERY MORNING
Status: DISCONTINUED | OUTPATIENT
Start: 2025-03-25 | End: 2025-04-01 | Stop reason: HOSPADM

## 2025-03-25 RX ORDER — ACETAMINOPHEN 325 MG/1
650 TABLET ORAL EVERY 4 HOURS PRN
Status: DISCONTINUED | OUTPATIENT
Start: 2025-03-25 | End: 2025-04-01 | Stop reason: HOSPADM

## 2025-03-25 RX ADMIN — BUSPIRONE HYDROCHLORIDE 7.5 MG: 7.5 TABLET ORAL at 20:07

## 2025-03-25 RX ADMIN — INSULIN ASPART 2 UNITS: 100 INJECTION, SOLUTION INTRAVENOUS; SUBCUTANEOUS at 13:18

## 2025-03-25 RX ADMIN — SENNOSIDES 8.6 MG: 8.6 TABLET, FILM COATED ORAL at 08:15

## 2025-03-25 RX ADMIN — LAMOTRIGINE 100 MG: 100 TABLET ORAL at 08:15

## 2025-03-25 RX ADMIN — OXYCODONE 10 MG: 5 TABLET ORAL at 04:29

## 2025-03-25 RX ADMIN — OXYCODONE 10 MG: 5 TABLET ORAL at 12:04

## 2025-03-25 RX ADMIN — BUSPIRONE HYDROCHLORIDE 7.5 MG: 7.5 TABLET ORAL at 08:15

## 2025-03-25 RX ADMIN — MIRABEGRON 50 MG: 25 TABLET, FILM COATED, EXTENDED RELEASE ORAL at 08:15

## 2025-03-25 RX ADMIN — TOLTERODINE TARTRATE 2 MG: 1 TABLET ORAL at 08:17

## 2025-03-25 RX ADMIN — TRIAMTERENE AND HYDROCHLOROTHIAZIDE 1 CAPSULE: 37.5; 25 CAPSULE ORAL at 13:09

## 2025-03-25 RX ADMIN — PREGABALIN 100 MG: 100 CAPSULE ORAL at 08:11

## 2025-03-25 RX ADMIN — FERROUS SULFATE TAB 325 MG (65 MG ELEMENTAL FE) 325 MG: 325 (65 FE) TAB at 20:05

## 2025-03-25 RX ADMIN — TOLTERODINE TARTRATE 2 MG: 1 TABLET ORAL at 20:06

## 2025-03-25 RX ADMIN — ROSUVASTATIN CALCIUM 10 MG: 10 TABLET, FILM COATED ORAL at 08:11

## 2025-03-25 RX ADMIN — MORPHINE SULFATE 2 MG: 2 INJECTION, SOLUTION INTRAMUSCULAR; INTRAVENOUS at 06:41

## 2025-03-25 RX ADMIN — HYDROXYZINE HYDROCHLORIDE 25 MG: 25 TABLET ORAL at 20:12

## 2025-03-25 RX ADMIN — HYDROXYZINE HYDROCHLORIDE 25 MG: 25 TABLET ORAL at 08:11

## 2025-03-25 RX ADMIN — PREGABALIN 100 MG: 100 CAPSULE ORAL at 13:09

## 2025-03-25 RX ADMIN — DULOXETINE 60 MG: 30 CAPSULE, DELAYED RELEASE ORAL at 08:15

## 2025-03-25 RX ADMIN — PREGABALIN 100 MG: 100 CAPSULE ORAL at 20:12

## 2025-03-25 RX ADMIN — SENNOSIDES 8.6 MG: 8.6 TABLET, FILM COATED ORAL at 20:06

## 2025-03-25 RX ADMIN — OXYCODONE HYDROCHLORIDE 5 MG: 5 TABLET ORAL at 17:53

## 2025-03-25 RX ADMIN — PANTOPRAZOLE SODIUM 40 MG: 40 TABLET, DELAYED RELEASE ORAL at 08:12

## 2025-03-25 RX ADMIN — INSULIN ASPART 2 UNITS: 100 INJECTION, SOLUTION INTRAVENOUS; SUBCUTANEOUS at 17:54

## 2025-03-25 RX ADMIN — METHOCARBAMOL TABLETS 750 MG: 750 TABLET, COATED ORAL at 03:06

## 2025-03-25 RX ADMIN — LAMOTRIGINE 100 MG: 100 TABLET ORAL at 20:07

## 2025-03-25 RX ADMIN — PREDNISONE 6 MG: 1 TABLET ORAL at 08:17

## 2025-03-25 RX ADMIN — FERROUS SULFATE TAB 325 MG (65 MG ELEMENTAL FE) 325 MG: 325 (65 FE) TAB at 08:15

## 2025-03-25 ASSESSMENT — ACTIVITIES OF DAILY LIVING (ADL)
ADLS_ACUITY_SCORE: 61
ADLS_ACUITY_SCORE: 58
ADLS_ACUITY_SCORE: 62
ADLS_ACUITY_SCORE: 65
ADLS_ACUITY_SCORE: 58
ADLS_ACUITY_SCORE: 58
ADLS_ACUITY_SCORE: 61
ADLS_ACUITY_SCORE: 58
ADLS_ACUITY_SCORE: 65
ADLS_ACUITY_SCORE: 59
ADLS_ACUITY_SCORE: 58
ADLS_ACUITY_SCORE: 62
ADLS_ACUITY_SCORE: 58

## 2025-03-25 NOTE — MEDICATION SCRIBE - ADMISSION MEDICATION HISTORY
Medication Scribe Admission Medication History    Admission medication history is complete. The information provided in this note is only as accurate as the sources available at the time of the update.    Information Source(s): Patient, Family member, and Clinic records via in-person    Pertinent Information: Patient is very hard of hearing, mostly communicated with the patient's PCA and son who assisted with communication. She has a home health nurse who sets up her scheduled medications and was contacted to fax us over a medication list. Patient reported she last took medication last night (scheduled) and has NOT taken anything since (last doses PTA 3/23/2025 HS).    Patient does have diazepam 2 mg and diazepam 1 mg/mL liquid with instructions to not take them both on the same day (one or the other only).     On lamotrigine 100 mg BID. Oxycodone-acetaminophen PRN. Daily prednisone dose is 6 mg QAM.      Patient has no observable medications that have been dispensed on file. Therefore, the medication list that was faxed over today was used as the primary source of information.     Changes made to PTA medication list:  Added:   Methocarbamol 500 mg  Empagliflozin 25 mg  Triamcinolone 0.1% ointment   Senna-docusate 8.6-50 mg  Calamine 8-8% lotion  Solifenacin 5 mg  Ferrous sulfate 325 mg  Semaglutide 2 mg injection  Rosuvastatin 10 mg  Diazepam 1 mg/mL solution (also has tabs)  Deleted:   Benzonatate 100 mg  Estradiol 0.1 mg/g vaginal cream  Hydrocortisone 1% cream  Glipizide 5 mg (taking 10 mg ER once daily QAM)  Melatonin 3 mg  Mirabegron 25 mg ER (taking 50 mg ER)phanazopyridine 200 mg  Trospium 20 mg (duplicate)  Changed:   Acetaminophen 325 mg --> 500 mg  Diazepam 2 mg tablet one tab Q6H PRN --> BID (max/2 daily)  Glipizide 10 mg --> glipizide 10 mg ER  Hydroxyzine 25 mg TID PRN --> BID scheduled  Oxycodone-acetaminophen 5-325 mg one tab QID PRN --> Q6H PRN (as written)  Polyethylene glycol 17 g daily --> daily  PRN    Allergies reviewed with patient and updates made in EHR: no    Medication History Completed By: Marvin Moreno 3/24/2025 9:34 PM    PTA Med List   Medication Sig Last Dose/Taking    acetaminophen (TYLENOL) 500 MG tablet Take 1,000 mg by mouth every 6 hours as needed. Unknown    busPIRone (BUSPAR) 7.5 MG tablet Take 7.5 mg by mouth 2 times daily 3/23/2025 Bedtime    calamine 8-8 % lotion Apply topically every hour as needed for itching. Unknown    diazepam (VALIUM) 1 MG/ML solution Take 0.5 mg by mouth 2 times daily as needed for anxiety (take 0.5 mg (0.5 mL) by mouth up to twice daily as needed -- do not take the same day as your diazepam tablet). Unknown    diazepam (VALIUM) 2 MG tablet Take 2 mg by mouth 2 times daily as needed for anxiety (take one tablet (2 mg) by mouth up to twice daily as needed -- do not take on the same day as your diazepam liquid solution). Unknown    DULoxetine (CYMBALTA) 60 MG capsule Take 60 mg by mouth every morning 3/23/2025 Morning    empagliflozin (JARDIANCE) 25 MG TABS tablet Take 25 mg by mouth daily. 3/23/2025 Morning    ferrous sulfate (FEROSUL) 325 (65 Fe) MG tablet Take 325 mg by mouth 2 times daily. 3/23/2025 Bedtime    glipiZIDE (GLUCOTROL XL) 10 MG 24 hr tablet Take 10 mg by mouth daily. 3/23/2025 Morning    hydrOXYzine (ATARAX) 25 MG tablet Take 25 mg by mouth 2 times daily. 3/23/2025 Bedtime    lamoTRIgine (LAMICTAL) 100 MG tablet Take 100 mg by mouth 2 times daily 3/23/2025 Bedtime    metFORMIN (GLUCOPHAGE XR) 500 MG 24 hr tablet Take 500 mg by mouth daily (with breakfast) 3/23/2025 Morning    methocarbamol (ROBAXIN) 500 MG tablet Take 500 mg by mouth 3 times daily as needed. Unknown    miconazole (MICATIN) 2 % external powder Apply topically 2 times daily as needed for other (candidiasis/intertrigo) To skin folds Unknown    mirabegron (MYRBETRIQ) 50 MG 24 hr tablet Take 1 tablet (50 mg) by mouth daily. 3/23/2025 Morning    omeprazole (PRILOSEC) 20 MG DR  capsule Take 20 mg by mouth daily 3/23/2025 Morning    oxyCODONE-acetaminophen (PERCOCET) 5-325 MG tablet Take 1 tablet by mouth 4 times daily as needed for pain (Patient taking differently: Take 1 tablet by mouth every 6 hours as needed for pain.) Unknown    polyethylene glycol (MIRALAX) 17 GM/Dose powder Take 1 Capful by mouth daily as needed for constipation. Unknown    senna-docusate (SENOKOT-S/PERICOLACE) 8.6-50 MG tablet Take 1 tablet by mouth 2 times daily as needed for constipation. Unknown    solifenacin (VESICARE) 5 MG tablet Take 5 mg by mouth daily. 3/23/2025 Morning    triamcinolone (KENALOG) 0.1 % external ointment Apply topically 2 times daily as needed for irritation. Unknown    predniSONE (DELTASONE) 1 MG tablet Take 1 mg by mouth every morning (In addition to the 5 mg dose; 1 mg + 5 mg = 6 mg) 3/23/2025 Morning    predniSONE (DELTASONE) 5 MG tablet Take 5 mg by mouth every morning (In addition to the 1 mg dose; 5 mg + 1 mg = 6 mg) 3/23/2025 Morning    pregabalin (LYRICA) 100 MG capsule Take 100 mg by mouth 2 times daily 3/23/2025 Bedtime    rosuvastatin (CRESTOR) 10 MG tablet Take 1 tablet by mouth daily. 3/23/2025 Morning    Semaglutide, 2 MG/DOSE, (OZEMPIC, 2 MG/DOSE,) 8 MG/3ML pen Inject 2 mg subcutaneously every 7 days. On Thursday 3/20/2025    triamterene-HCTZ (DYAZIDE) 37.5-25 MG capsule Take 1 capsule by mouth every morning Hold for SBP<110 3/23/2025 Morning    trospium (SANCTURA) 20 MG tablet Take 1 tablet (20 mg) by mouth 2 times daily (before meals). 3/23/2025 Bedtime

## 2025-03-25 NOTE — PROGRESS NOTES
"[facility] ED Handoff Report    ED Chief Complaint: Fall, elbow injury     ED Diagnosis:  (R29.898) Weakness of both lower extremities  Plan: therapy evaluation    (M48.061) Spinal stenosis of lumbar region, unspecified whether neurogenic claudication present  Plan: neurosurgery input, repeat imaging     PMH:    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    Hepatic abscess     Hyperlipidemia     Hypertension     Hypothyroidism     Irritable bowel syndrome     Meniere disease     Neuropathy     Noninfectious ileitis     Peritoneal abscess (H)     Spinal stenosis of lumbar region     Steroid long-term use     Vaginitis, atrophic, postmenopausal     Vitamin D deficiency         Code Status:  No CPR- Do NOT Intubate     Falls Risk: Yes Band: Applied    Current Living Situation/Residence: lives with their son or daughter     Elimination Status: Continent: No, purewick in place     Activity Level: 2 assist with gait belt and walker, pivot to wheelchair, repositioning while in bed with pillows.     Patient's Preferred Language:  English     Needed: No    Vital Signs:  BP (!) 146/65 (BP Location: Left arm)   Pulse 80   Temp 98.3  F (36.8  C) (Oral)   Resp 18   Ht 1.651 m (5' 5\")   LMP  (LMP Unknown)   SpO2 92%   BMI 29.62 kg/m       Cardiac Rhythm: NA    Pain Score: 8/10, PRN oxycodone 10mg last given around 1204    Is the Patient Confused:  No    Last Food or Drink: 03/25/25 at 0800    Assessment and Plan of Care: Therapy evaluations, neurosurgery input     Tests Performed: Done: Labs and Imaging    Treatments Provided:  Medications, " Incontinence care, imaging    Family Dynamics/Concerns: No    Belongings Checklist Done and Signed by Patient: Yes    Belongings Sent with Patient: Refer to checklist     Boarding medications sent with patient: Yes      RN: Erlinda Ashby RN 3/25/2025 12:11 PM

## 2025-03-25 NOTE — PLAN OF CARE
PRIMARY DIAGNOSIS: ACUTE PAIN  OUTPATIENT/OBSERVATION GOALS TO BE MET BEFORE DISCHARGE:  1. Pain Status: Improved-controlled with oral pain medications.    2. Return to near baseline physical activity: No    3. Cleared for discharge by consultants (if involved): No    Discharge Planner Nurse   Safe discharge environment identified: No  Barriers to discharge: Yes    Patient disoriented to time.  VSS except HTN.  Using oxycodone and robaxin for pain control in BLE's and generalized pain.  Purewick in place.  Bedrest tonight due to weakness in her BLE.  Call light in reach.            Please review provider order for any additional goals.   Nurse to notify provider when observation goals have been met and patient is ready for discharge.

## 2025-03-25 NOTE — CARE PLAN
Regular diet given, output was 175 ml. UA sent to the lab. After pain medication and ativan she was very sleepy and some what forgetful.     Gene andrews.

## 2025-03-25 NOTE — ED NOTES
Report and care handed off to CHERELLE Shane. Pt is sleeping with HOB elevated and lights dimmed, maintaining SaO2 on room air.

## 2025-03-25 NOTE — PLAN OF CARE
Orientation: Disoriented to time  Mobility: bedrest tonight due to weakness  VS: VSS except HTN at times  Pain:  generalized pain, oxycodone used for pain  Urinary: purewick in place  Drain/lines:  PIV and purewick  Skin:  small open area on coccyx area, Mepilex placed, patient turned on side.scattered bruising and scabs  Tx:  Pain control  Consults:  [facility], Pt/OT, neurology  Disposition:  TBD    One time dose of IV morphine was ordered for patient due to uncontrolled pain this morning.

## 2025-03-25 NOTE — ED NOTES
"Pt tolerated PO meds well with some larger pills in applesauce and the smaller taken with sips of water. She states that she is \"ready for sleep;\" pt was moved to inpt hospital bed with EDT assist and hovermat - she reports improvement in her pain after changing to inpt bed. Call light is within reach, 2 side rails up for safety.  "

## 2025-03-25 NOTE — PROGRESS NOTES
BRIEF SOCIAL WORK NOTE:    SW called by Pricila Mtz, [facility] Care Coordinator [phone]. Son Davie notes increased needs due to many medications. Pt fell in bathroom. Pt needs to be able to transfer on/off toilet to return home. Has RN visits for medications, sees PT outpatient, and has ICS worker for daily needs. Family looking at [facility] transition but has not spoken to pt.    CARISSA Mayo

## 2025-03-25 NOTE — PROGRESS NOTES
PRIMARY DIAGNOSIS: GENERALIZED WEAKNESS    OUTPATIENT/OBSERVATION GOALS TO BE MET BEFORE DISCHARGE  1. Orthostatic performed: N/A    2. Tolerating PO medications: Yes    3. Return to near baseline physical activity: No, requiring assist 2, pivot to wheelchair.     4. Cleared for discharge by consultants (if involved): No    Discharge Planner Nurse   Safe discharge environment identified: Yes  Barriers to discharge: Yes, consults        Entered by: Erlinda Ashby RN 03/25/2025 11:26 AM     Please review provider order for any additional goals.   Nurse to notify provider when observation goals have been met and patient is ready for discharge.

## 2025-03-25 NOTE — ED NOTES
Pt remains A&O, she is having a difficult time communicating since her son and PCA left though they said they will be back later this evening. Hospitalist in with pt now.

## 2025-03-25 NOTE — PROGRESS NOTES
[facility]    Medicine Progress Note - Hospitalist Service    Date of Admission:  3/24/2025    Assessment & Plan      Jumana Yang is a 84 year old female admitted with lower extremity pain and weakness.  Neurosurgery consulted and is offering the patient a decompression.  Decision on surgical intervention is still being determined.  For now, continue with pain management.    # Lumbar spinal canal stenosis  # Lower extremity weakness  - neurosurgery consulted  - PT/OT  - tylenol, oxycodone, methocarbamol prn  - pregabalin    # Coccyx wound  - WOC consult    # Diabetes  - ISS    # HTN    # COPD    # Meniere's disease  - chronic prednisone  - triamterene/hydrochlorothiazide    # Depression  # Anxiety  # Bipolar disorder  - buspirone  - duloxetine  - hydroxyzine  - lamotrigine       Observation Goals: -diagnostic tests and consults completed and resulted, Nurse to notify provider when observation goals have been met and patient is ready for discharge.  Diet: Regular Diet Adult      Teran Catheter: Not present  Lines: None     Cardiac Monitoring: None  Code Status: No CPR- Do NOT Intubate      Clinically Significant Risk Factors Present on Admission        # Hypokalemia: Lowest K = 3.3 mmol/L in last 2 days, will replace as needed            # Hypertension: Noted on problem list               # Financial/Environmental Concerns:           Social Drivers of Health    Tobacco Use: Medium Risk (3/24/2025)    Patient History     Smoking Tobacco Use: Former     Smokeless Tobacco Use: Former          Disposition Plan     Medically Ready for Discharge: Anticipated in 2-4 Days             Kristian Stearns MD  Hospitalist Service  [facility]  Securely message with [employer] (more info)  Text page via [email or web address] Paging/Directory   ______________________________________________________________________    Interval History   History is tangential at times and interjected with patient's  concern about having her son come to the hospital to discuss options moving forward.  She reports pain is a 9/10 at rest and is worse with movement.  Pain is greater in the left leg than the right.  Doesn't know if she has leg numbness.  She denies dyspnea, chest pain, or chest pressure.  Reports a three year history of abdominal distension she attributes to lymphedema.    Physical Exam   Vital Signs: Temp: 98.3  F (36.8  C) Temp src: Oral BP: (!) 146/65 Pulse: 80   Resp: 18 SpO2: 92 % O2 Device: None (Room air)    Weight: 0 lbs 0 oz    Gen:  lying in bed in no extremis  Neuro:  alert, conversant; tearful at times during interview, anxious  CV:  nl rate, regular rhythm  Pulm:  no acute resp distress, ctab anteriorly  GI:  initially with right sided TTP but was not reproduced on subsequent palpation    Medical Decision Making             Data   Reviewed:    Na 141  K 4  BUN 15  Cr 1

## 2025-03-25 NOTE — CONSULTS
[facility]    Neurosurgery Consultation     Date of Admission:  3/24/2025  Date of Consult (When I saw the patient): 03/25/25    Assessment   Jumana Yang is a 84 year old female with history of spinal stenosis, chronic pain, DM2, CKD, sensorineural hearing loss, lower extremity edema, cauda equina compression who was admitted on 3/24/2025 for evaluation after a fall. Neurosurgery was consulted for lower extremity weakness, lumbar spinal stenosis.    Patient seen this afternoon in bed. Very hard of hearing but able to read lips if speaking slow and articulating. Patient states her legs gave out on her when she transferred in the bathroom, which has not happened in the past. Extensive discussion with patient about what surgery would entail including risks of infection, further damage to spinal canal and nerves, bleeding, and risks of anesthesia. Did discuss with patient that if she decides to do surgery, with therapy we believe she has a possibility of improving leg strength and function. If she does not want surgery, conservative management would include possible ROSA and she may see a progressive decline of BLE function. Called patient's son Davie and had discussion with him as well pertaining to the information above.     EXAM: MR LUMBAR SPINE W/O and W CONTRAST  LOCATION: [facility]  DATE: 3/24/2025                                                                   IMPRESSION:  1.  At L2-L3, severe spinal canal stenosis and mild bilateral neural foraminal stenosis.  2.  At L3-L4, moderate to severe spinal canal stenosis with asymmetric narrowing of the right lateral recess. Mild to moderate right and mild left neural foraminal stenosis.  3.  At L4-L5, moderate to severe spinal canal stenosis with mild right and mild to moderate left neural foraminal stenosis.  4.  At L5-S1, mild spinal canal stenosis with asymmetric right lateral recess stenosis. Moderate right and  severe left neural foraminal stenosis with impingement upon the left L5 nerve root.    EXAM: CT CERVICAL SPINE W/O CONTRAST, CT LUMBAR SPINE W/O CONTRAST, CT THORACIC SPINE W/O CONTRAST  LOCATION: [facility]  DATE: 3/24/2025                                                                   IMPRESSION:  CERVICAL SPINE CT:  1.  No acute fracture or posttraumatic subluxation.  2.  No high-grade spinal canal stenosis. Severe neural foraminal stenosis on the right at C5-C6.     THORACIC SPINE CT:  1.  No acute fracture or posttraumatic subluxation.  2.  No high-grade spinal canal or neural foraminal stenosis.     LUMBAR SPINE CT:  1.  No acute fracture or posttraumatic subluxation.  2.  Moderate spinal canal stenosis at L2-L3, L3-L4, and L4-L5.  3.  Severe neural foraminal stenosis on the left at L5-S1.    EXAM: XR LUMBAR FLEX/EXT 2/3 VIEWS  LOCATION: [facility]  DATE: 3/25/2025                                                            IMPRESSION: No fracture is identified. Multilevel mild to moderate degenerative disc disease. Severe lower facet arthropathy. Grade I anterolisthesis of L4 on L5 and L5 on S1, similar in flexion versus extension.    Plan   - Lumbar flexion/extension XR order to evaluate spinal alignment and stability resulted above  - Dr. Blake is offering surgical intervention, L2-L4 laminectomy. Patient would like to discuss with son who is arriving later this afternoon and will let us know what she decides  - Further recommendations pending patient's decision     I have discussed the following assessment and plan with Dr. Blake.     Sepideh Riggs, CNP  [facility]  [address] [place] [address]  [address] [place] [id number]  Tel [phone]  Fax [phone]     Code Status    No CPR- Do NOT Intubate    Reason for Consult   I was asked by Kristian Stearns MD to evaluate this patient for:  Lower extremity weakness, lumbar spinal  stenosis    Primary Care Physician   Terra Mathews    Chief Complaint   Evaluation s/p fall     History is obtained from the patient and electronic health record    History of Present Illness   Jumana Yang is a 84 year old female who presents after a mechanical fall. Patient was transferring herself off her toilet 3/24 AM around 0900 when she felt her legs give out. She fell directly on her coccyx area. She was too weak to get up, so she crawled to the living room over the course of about an hour and a half. She was then able to call  her PCA, and her son got her out of her apartment. She has had increased flares of bilateral leg weakness with a walker with assistance, but today after her fall, she is barely able to move either of her legs.     She has followed with [facility] in the past for her spinal canal stenosis. She was admitted on 7/19/21 at [facility] by Dr. Velásquez from [facility] for right leg pain and to rule out cauda equina. Had loss of bowel and bladder. Was seeing a pain clinic and had previously been offered surgery but declined due to her poor response to surgeries in the past, especially noting her history of infection. Deemed very poor surgical candidate, treated with adding Gabapentin and discharged the next day.     Seen by [facility] again after admission to [facility] on 3/8/2022 for pneumonia and sepsis, acute on chronic low back pain and difficulty walking. At that time her and her son wished to hold off on any surgery even though it was explained to them that the natural progression of spinal stenosis of cervical and lumbar spine may result in her being immobilized into a wheelchair. Palliative care consult was provided and follow up to reassess surgery after her infection was cleared was recommended. Patient had an IR consult for bilateral L5 TFESI. Per patient and son this injection did not provide any benefit.     Ruth Vallejo from [facility] was consulted on 5/28/2022 for frequent  falls and signed off after chart review revealed patient and son declined surgical intervention even after pathology discussed along with the progression of physical decline outlined.     Past Medical History   I have reviewed this patient's medical history and updated it with pertinent information if needed.   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  Past Surgical History:   Procedure Laterality Date    CATARACT IOL, RT/LT Left 7/2013    FOOT SURGERY      toe    GI SURGERY      IR LUMBAR/SACRAL TRANSFOR INJ BILATERAL Bilateral 3/11/2022    LAPAROSCOPIC HEPATECTOMY PARTIAL  11/4/2013    Procedure: LAPAROSCOPIC HEPATECTOMY PARTIAL;  Laparoscopic Debridement of Liver Abcess;  Surgeon: Sepideh Green MD;  Location: [facility] OR    ORTHOPEDIC SURGERY      PICC INSERTION  8/28/2013    5fr DL Power PICC, 41cm (1cm external length) in the R lateral brachial vein with tip in the SVC RA junction.    RECTAL SURGERY      1970s    RELEASE CARPAL TUNNEL Left 3/21/2023    Procedure: LEFT OPEN CARPAL TUNNEL RELEASE. LEFT RING FINGER A1 PULLEY RELEASE;  Surgeon: Claire Hamilton MD;  Location: [facility] OR    VASCULAR  SURGERY         Prior to Admission Medications   Prior to Admission Medications   Prescriptions Last Dose Informant Patient Reported? Taking?   CONTOUR NEXT TEST test strip  Care Giver Yes No   Sig: TESTING EVERY DAY DX  E11.9   DULoxetine (CYMBALTA) 60 MG capsule 3/23/2025 Morning Care Giver Yes Yes   Sig: Take 60 mg by mouth every morning   Semaglutide, 2 MG/DOSE, (OZEMPIC, 2 MG/DOSE,) 8 MG/3ML pen 3/20/2025  Yes Yes   Sig: Inject 2 mg subcutaneously every 7 days. On Thursday   acetaminophen (TYLENOL) 500 MG tablet Unknown  Yes Yes   Sig: Take 1,000 mg by mouth every 6 hours as needed.   blood glucose (ACCU-CHEK FASTCLIX) lancing device  Care Giver Yes No   Sig: FOR TESTING ONCE DAILY. DX  E11.9 TYPE 2 DIABETES   blood glucose (CONTOUR TEST) test strip  Care Giver Yes No   Sig: TESTING EVERY DAY DX  E11.9   busPIRone (BUSPAR) 7.5 MG tablet 3/23/2025 Bedtime Care Giver Yes Yes   Sig: Take 7.5 mg by mouth 2 times daily   calamine 8-8 % lotion Unknown  Yes Yes   Sig: Apply topically every hour as needed for itching.   diazepam (VALIUM) 1 MG/ML solution Unknown  Yes Yes   Sig: Take 0.5 mg by mouth 2 times daily as needed for anxiety (take 0.5 mg (0.5 mL) by mouth up to twice daily as needed -- do not take the same day as your diazepam tablet).   diazepam (VALIUM) 2 MG tablet Unknown  Yes Yes   Sig: Take 2 mg by mouth 2 times daily as needed for anxiety (take one tablet (2 mg) by mouth up to twice daily as needed -- do not take on the same day as your diazepam liquid solution).   empagliflozin (JARDIANCE) 25 MG TABS tablet 3/23/2025 Morning  Yes Yes   Sig: Take 25 mg by mouth daily.   ferrous sulfate (FEROSUL) 325 (65 Fe) MG tablet 3/23/2025 Bedtime  Yes Yes   Sig: Take 325 mg by mouth 2 times daily.   glipiZIDE (GLUCOTROL XL) 10 MG 24 hr tablet 3/23/2025 Morning  Yes Yes   Sig: Take 10 mg by mouth daily.   hydrOXYzine (ATARAX) 25 MG tablet 3/23/2025 Bedtime Care Giver Yes Yes   Sig: Take 25 mg by mouth 2 times daily.    lamoTRIgine (LAMICTAL) 100 MG tablet 3/23/2025 Bedtime Care Giver Yes Yes   Sig: Take 100 mg by mouth 2 times daily   metFORMIN (GLUCOPHAGE XR) 500 MG 24 hr tablet 3/23/2025 Morning  Yes Yes   Sig: Take 500 mg by mouth daily (with breakfast)   methocarbamol (ROBAXIN) 500 MG tablet Unknown  Yes Yes   Sig: Take 500 mg by mouth 3 times daily as needed.   miconazole (MICATIN) 2 % external powder Unknown  No Yes   Sig: Apply topically 2 times daily as needed for other (candidiasis/intertrigo) To skin folds   mirabegron (MYRBETRIQ) 50 MG 24 hr tablet 3/23/2025 Morning  No Yes   Sig: Take 1 tablet (50 mg) by mouth daily.   omeprazole (PRILOSEC) 20 MG DR capsule 3/23/2025 Morning Care Giver Yes Yes   Sig: Take 20 mg by mouth daily   oxyCODONE-acetaminophen (PERCOCET) 5-325 MG tablet Unknown  No Yes   Sig: Take 1 tablet by mouth 4 times daily as needed for pain   Patient taking differently: Take 1 tablet by mouth every 6 hours as needed for pain.   polyethylene glycol (MIRALAX) 17 GM/Dose powder Unknown  Yes Yes   Sig: Take 1 Capful by mouth daily as needed for constipation.   predniSONE (DELTASONE) 1 MG tablet 3/23/2025 Morning Care Giver Yes Yes   Sig: Take 1 mg by mouth every morning (In addition to the 5 mg dose; 1 mg + 5 mg = 6 mg)   predniSONE (DELTASONE) 5 MG tablet 3/23/2025 Morning Care Giver Yes Yes   Sig: Take 5 mg by mouth every morning (In addition to the 1 mg dose; 5 mg + 1 mg = 6 mg)   pregabalin (LYRICA) 100 MG capsule 3/23/2025 Bedtime  Yes Yes   Sig: Take 100 mg by mouth 2 times daily   rosuvastatin (CRESTOR) 10 MG tablet 3/23/2025 Morning  Yes Yes   Sig: Take 1 tablet by mouth daily.   senna-docusate (SENOKOT-S/PERICOLACE) 8.6-50 MG tablet Unknown  Yes Yes   Sig: Take 1 tablet by mouth 2 times daily as needed for constipation.   solifenacin (VESICARE) 5 MG tablet 3/23/2025 Morning  Yes Yes   Sig: Take 5 mg by mouth daily.   triamcinolone (KENALOG) 0.1 % external ointment Unknown  Yes Yes   Sig: Apply  topically 2 times daily as needed for irritation.   triamterene-HCTZ (DYAZIDE) 37.5-25 MG capsule 3/23/2025 Morning Care Giver Yes Yes   Sig: Take 1 capsule by mouth every morning Hold for SBP<110   trospium (SANCTURA) 20 MG tablet 3/23/2025 Bedtime  No Yes   Sig: Take 1 tablet (20 mg) by mouth 2 times daily (before meals).      Facility-Administered Medications: None     Allergies   Allergies   Allergen Reactions    Propofol Nausea and Vomiting    Shellfish Allergy      Other reaction(s): Dizziness    Capsaicin Rash and Blisters    Capsaicin Blisters and Rash    Tegaderm Transparent Dressing (Informational Only) Itching and Rash       Social History   I have reviewed this patient's social history and updated it with pertinent information if needed. Jumana Yang  reports that she quit smoking about 37 years ago. Her smoking use included cigarettes. She has quit using smokeless tobacco. She reports that she does not currently use alcohol. She reports that she does not use drugs.    Family History   I have reviewed this patient's family history and updated it with pertinent information if needed.   Family History   Problem Relation Age of Onset    Cerebrovascular Disease Mother     Heart Disease Mother     Heart Disease Father     Heart Disease Brother     Diabetes No family hx of     Coronary Artery Disease No family hx of     Hypertension No family hx of     Hyperlipidemia No family hx of     Breast Cancer No family hx of     Colon Cancer No family hx of     Prostate Cancer No family hx of     Other Cancer No family hx of     Depression No family hx of     Anxiety Disorder No family hx of     Mental Illness No family hx of     Substance Abuse No family hx of     Anesthesia Reaction No family hx of     Asthma No family hx of     Osteoporosis No family hx of     Genetic Disorder No family hx of     Thyroid Disease No family hx of     Obesity No family hx of     Unknown/Adopted No family hx of        Review of Systems    10 point ROS negative other than symptoms noted in HPI.    Physical Exam   Vital signs with ranges:   Temp:  [97.5  F (36.4  C)-98.7  F (37.1  C)] 98.4  F (36.9  C)  Pulse:  [76-93] 81  Resp:  [17-18] 18  BP: (117-178)/(55-86) 129/62  SpO2:  [91 %-100 %] 97 %  0 lbs 0 oz    HEENT:  Normocephalic, atraumatic  Heart:  No peripheral edema  Lungs:  No SOB  Skin:  Warm and dry, good capillary refill.    NEUROLOGICAL EXAMINATION:   Mental status:  Alert and Oriented x 3, speech is fluent. Very hard of hearing but able to read lips  Motor:    Iliopsoas  (hip flexion)               Right: 2/5  Left:  3/5  Quadriceps  (knee extension)       Right:  3/5  Left: 3/5  Hamstrings  (knee flexion)            Right:  3/5  Left:  3+/5  Gastroc Soleus  (PF)                          Right:  5/5  Left:  5/5  Tibialis Ant  (DF)                          Right:  5/5  Left:  5/5  Sensation: Chronic leg tingling  Reflexes:   Few beats of clonus right side.    Data   CBC RESULTS:   Recent Labs   Lab Test 03/24/25  1454   WBC 11.5*   RBC 4.65   HGB 13.7   HCT 42.5   MCV 91   MCH 29.5   MCHC 32.2   RDW 14.3        Basic Metabolic Panel:  Lab Results   Component Value Date     03/25/2025     10/24/2017      Lab Results   Component Value Date    POTASSIUM 4.0 03/25/2025    POTASSIUM 3.4 05/22/2023    POTASSIUM 3.9 10/24/2017     Lab Results   Component Value Date    CHLORIDE 102 03/25/2025    CHLORIDE 100 05/22/2023    CHLORIDE 101 10/24/2017     Lab Results   Component Value Date    MACY 9.1 03/25/2025    MACY 9.5 10/24/2017     Lab Results   Component Value Date    CO2 28 03/25/2025    CO2 28 05/22/2023    CO2 28 10/24/2017     Lab Results   Component Value Date    BUN 14.6 03/25/2025    BUN 16 05/22/2023    BUN 18 10/24/2017     Lab Results   Component Value Date    CR 1.01 03/25/2025    CR 0.84 10/24/2017     Lab Results   Component Value Date     03/25/2025     03/25/2025     05/22/2023      10/24/2017     INR:  Lab Results   Component Value Date    INR 1.03 01/29/2024    INR 1.02 06/16/2022    INR 1.01 05/26/2022    INR 0.9 09/06/2016    INR 2.6 07/26/2016    INR 2.1 06/14/2016    INR 2.1 06/14/2016    INR 2.3 05/10/2016    INR 2.2 04/12/2016    INR 1.9 03/22/2016    INR 1.9 03/22/2016    INR 2.4 02/23/2016    INR 2.5 01/26/2016    INR 2.5 12/29/2015    INR 2.6 12/08/2015    INR 2.01 10/26/2015    INR 2.04 10/25/2015    INR 1.59 07/30/2015    INR 1.65 07/27/2015    INR 0.96 07/18/2015    INR 0.96 10/31/2013    INR 1.00 08/23/2013

## 2025-03-25 NOTE — H&P
[facility]    History and Physical - Hospitalist Service       Date of Admission:  3/24/2025    Assessment & Plan      Jumana Yang is a 84 year old female presenting for leg pain and weakness following a fall.  She is clinically stable.  She has a known history of lumbar spinal stenosis as well as previously demonstrated L5 nerve impingement.  By her report, she has had a chronic worsening of her functional status over time.  The lower extremity weakness today appeared to precede her fall.  There appears to have been some progression of lumbar spinal canal stenosis compared to the MRI from 9/25/24.  Given these findings, neurosurgery consult ordered.  Plan for pain control in the meantime.  Patient will also benefit from PT/OT evaluation.      # Lumbar spinal canal stenosis  # Lower extremity weakness  - neurosurgery consult  - PT/OT  - tylenol, oxycodone, methocarbamol prn  - increase home pregabalin to 100mg TID (max dose for CrCl)    # Diabetes  - ISS    # HTN    # COPD    # Depression  # Anxiety  # Bipolar disorder  - buspirone  - duloxetine  - hydroxyzine  - lamotrigine       Observation Goals: -diagnostic tests and consults completed and resulted, Nurse to notify provider when observation goals have been met and patient is ready for discharge.  Diet: Regular Diet Adult      Teran Catheter: Not present  Lines: None     Cardiac Monitoring: None  Code Status: No CPR- Do NOT Intubate    Clinically Significant Risk Factors Present on Admission        # Hypokalemia: Lowest K = 3.3 mmol/L in last 2 days, will replace as needed            # Hypertension: Noted on problem list               # Financial/Environmental Concerns:           Disposition Plan     Medically Ready for Discharge: Anticipated Tomorrow           Kristian Stearns MD  Hospitalist Service  [facility]  Securely message with [employer] (more info)  Text page via [employer] Paging/Directory      ______________________________________________________________________    Chief Complaint   Pain, weakness    History is obtained from the patient; patient very hard of hearing and at times requires writing out questions on paper to communicate    History of Present Illness   Jumana Yang is a 84 year old female who presents for evaluation of leg pain and weakness.  The reports a history of chronic pain due to spinal stenosis.  This progressed to involve her cervical spine in the last year.  She has typically been confined to using a wheelchair.  However, she has been able to walk with a walker recently.  She reports a history of stepwise decline in functional status after having falls.    This morning around 8a she went to the bathroom.  She then tried to stand using bars and noted bilateral leg weakness.  She fell on her buttocks.  She scooted herself across her home to get help.  She reports the pain is a 5/10 without movement and is worse with movement.  She has had chronic leg tingling for one year.    She denies fevers, chills, chest pain, or chest pressure.  She endorses occasional dyspnea on exertion.      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    Hepatic abscess     Hyperlipidemia     Hypertension     Hypothyroidism     Irritable bowel syndrome     Meniere disease     Neuropathy     Noninfectious ileitis     Peritoneal abscess (H)     Spinal stenosis of lumbar region     Steroid long-term use     Vaginitis, atrophic, postmenopausal     Vitamin D deficiency        Past Surgical History   Past Surgical History:    Procedure Laterality Date    CATARACT IOL, RT/LT Left 2013    FOOT SURGERY      toe    GI SURGERY      IR LUMBAR/SACRAL TRANSFOR INJ BILATERAL Bilateral 3/11/2022    LAPAROSCOPIC HEPATECTOMY PARTIAL  2013    Procedure: LAPAROSCOPIC HEPATECTOMY PARTIAL;  Laparoscopic Debridement of Liver Abcess;  Surgeon: Sepideh Green MD;  Location: [facility] OR    ORTHOPEDIC SURGERY      PICC INSERTION  2013    5fr DL Power PICC, 41cm (1cm external length) in the R lateral brachial vein with tip in the SVC RA junction.    RECTAL SURGERY      1970s    RELEASE CARPAL TUNNEL Left 3/21/2023    Procedure: LEFT OPEN CARPAL TUNNEL RELEASE. LEFT RING FINGER A1 PULLEY RELEASE;  Surgeon: Claire Hamilton MD;  Location: [facility] OR    VASCULAR SURGERY         Prior to Admission Medications   Prior to Admission Medications   Prescriptions Last Dose Informant Patient Reported? Taking?   CONTOUR NEXT TEST test strip  Care Giver Yes No   Sig: TESTING EVERY DAY DX  E11.9   DULoxetine (CYMBALTA) 60 MG capsule  Care Giver Yes No   Sig: Take 60 mg by mouth every morning   acetaminophen (TYLENOL) 325 MG tablet   Yes No   Si mg every 6 hours as needed   benzonatate (TESSALON) 100 MG capsule   Yes No   blood glucose (ACCU-CHEK FASTCLIX) lancing device  Care Giver Yes No   Sig: FOR TESTING ONCE DAILY. DX  E11.9 TYPE 2 DIABETES   blood glucose (CONTOUR TEST) test strip  Care Giver Yes No   Sig: TESTING EVERY DAY DX  E11.9   busPIRone (BUSPAR) 7.5 MG tablet  Care Giver Yes No   Sig: Take 7.5 mg by mouth 2 times daily   diazepam (VALIUM) 2 MG tablet   No No   Sig: Take 1 tablet (2 mg) by mouth every 6 hours as needed for anxiety   estradiol (ESTRACE) 0.1 MG/GM vaginal cream   No No   Sig: Place 1 g vaginally three times a week.   glipiZIDE (GLUCOTROL) 10 MG tablet   Yes No   Sig: Take 10 mg by mouth daily (with breakfast)   glipiZIDE (GLUCOTROL) 5 MG tablet   Yes No   Sig: Take 5 mg by mouth daily (with dinner)   hydrOXYzine  (ATARAX) 25 MG tablet  Care Giver Yes No   Sig: Take 25 mg by mouth 3 times daily as needed for anxiety   hydrocortisone (CORTAID) 1 % external cream   No No   Sig: Apply topically 2 times daily as needed (hemorrhoid irritation) To hemorrhoids.   lamoTRIgine (LAMICTAL) 100 MG tablet  Care Giver Yes No   Sig: Take 100 mg by mouth 2 times daily   melatonin 3 MG tablet   No No   Sig: Take 1 tablet (3 mg) by mouth nightly as needed for sleep   metFORMIN (GLUCOPHAGE XR) 500 MG 24 hr tablet   Yes No   Sig: Take 500 mg by mouth daily (with breakfast)   miconazole (MICATIN) 2 % external powder   No No   Sig: Apply topically 2 times daily as needed for other (candidiasis/intertrigo) To skin folds   mirabegron (MYRBETRIQ) 25 MG 24 hr tablet   No No   Sig: Take 1 tablet (25 mg) by mouth daily   mirabegron (MYRBETRIQ) 50 MG 24 hr tablet   No No   Sig: Take 1 tablet (50 mg) by mouth daily.   omeprazole (PRILOSEC) 20 MG DR capsule  Care Giver Yes No   Sig: Take 20 mg by mouth daily   oxyCODONE-acetaminophen (PERCOCET) 5-325 MG tablet   No No   Sig: Take 1 tablet by mouth 4 times daily as needed for pain   phenazopyridine (PYRIDIUM) 200 MG tablet   No No   Sig: Take 1 tablet (200 mg) by mouth 3 times daily as needed for irritation   polyethylene glycol (MIRALAX) 17 GM/Dose powder   No No   Sig: Take 17 g by mouth daily   predniSONE (DELTASONE) 1 MG tablet  Care Giver Yes No   Sig: Take 1 mg by mouth every morning (In addition to the 5 mg dose; 1 mg + 5 mg = 6 mg)   predniSONE (DELTASONE) 5 MG tablet  Care Giver Yes No   Sig: Take 5 mg by mouth every morning (In addition to the 1 mg dose; 5 mg + 1 mg = 6 mg)   pregabalin (LYRICA) 100 MG capsule   Yes No   Sig: Take 100 mg by mouth 2 times daily   triamterene-HCTZ (DYAZIDE) 37.5-25 MG capsule  Care Giver Yes No   Sig: Take 1 capsule by mouth every morning Hold for SBP<110   trospium (SANCTURA) 20 MG tablet   No No   Sig: Take 1 tablet (20 mg) by mouth 2 times daily (before meals).    trospium (SANCTURA) 20 MG tablet   No No   Sig: Take 1 tablet (20 mg) by mouth 2 times daily (before meals).      Facility-Administered Medications: None           Physical Exam   Vital Signs: Temp: 97.5  F (36.4  C) Temp src: Oral BP: 128/72 Pulse: 78   Resp: 18 SpO2: (!) 91 % O2 Device: None (Room air)    Weight: 0 lbs 0 oz    Gen:  lying in bed in no extremis  Neuro:  alert, conversant; 4/5 strength L hip extension, 3/5 L hip flexion, 3/5 L knee extension, able to flex at left knee on her own, 5/5 L ankle dorsi/plantarflexion; 4/5 strength R hip extension, 3/5 R hip flexion, 3/5 R knee flexion, doesn't demonstrate R knee extension, 5/5 R ankle dorsi/plantarflexion  CV:  nl rate, regular rhythm  Pulm:  no acute resp distress, ctab anteriorly  GI:  abdomen NTTP    Medical Decision Making             Data   Reviewed:    Na 143  K 3.3  BUN 16  Cr 1.1    WBC 12  Hgb 14  Plts 208    CT c/t/l spine  CERVICAL SPINE CT:  1.  No acute fracture or posttraumatic subluxation.  2.  No high-grade spinal canal stenosis. Severe neural foraminal stenosis on the right at C5-C6.     THORACIC SPINE CT:  1.  No acute fracture or posttraumatic subluxation.  2.  No high-grade spinal canal or neural foraminal stenosis.     LUMBAR SPINE CT:  1.  No acute fracture or posttraumatic subluxation.  2.  Moderate spinal canal stenosis at L2-L3, L3-L4, and L4-L5.  3.  Severe neural foraminal stenosis on the left at L5-S1.      MRI L spine  1.  At L2-L3, severe spinal canal stenosis and mild bilateral neural foraminal stenosis.  2.  At L3-L4, moderate to severe spinal canal stenosis with asymmetric narrowing of the right lateral recess. Mild to moderate right and mild left neural foraminal stenosis.  3.  At L4-L5, moderate to severe spinal canal stenosis with mild right and mild to moderate left neural foraminal stenosis.  4.  At L5-S1, mild spinal canal stenosis with asymmetric right lateral recess stenosis. Moderate right and severe left  neural foraminal stenosis with impingement upon the left L5 nerve root.

## 2025-03-25 NOTE — CONSULTS
[facility]  WO Nurse Inpatient Assessment     Consulted for: coccyx    Summary:     WO nurse follow-up plan: weekly    Patient History (according to provider note(s):      History is tangential at times and interjected with patient's concern about having her son come to the hospital to discuss options moving forward. She reports pain is a 9/10 at rest and is worse with movement. Pain is greater in the left leg than the right. Doesn't know if she has leg numbness. She denies dyspnea, chest pain, or chest pressure. Reports a three year history of abdominal distension she attributes to lymphedema.     Assessment:      Skin Injury Location: Gluteal cleft    3/25    Last photo: 3/25  Skin injury due to: Moisture associated skin damage (MASD)  Skin history and plan of care:   blanchable redness but clear area of red. Spoke to bedside nurse about getting some offloading wedges and nurse stated she would just use pillows to help patient readjust.   Affected area:      Skin assessment: Erosion of epidermis     Measurements (length x width x depth, in cm) 1.2  x 0.3  x  0.05 cm      Color: pink and red     Temperature  warm     Drainage: scant .      Color: serous      Odor: none  Pain: denies , none at site, general spinal pain per patient  Pain interventions prior to dressing change: slow and gentle cares   Treatment goal: Infection control/prevention, Protection, and Promote epidermal migration  STATUS: initial assessment  Supplies ordered: ordered sacral mepilex    Patient pointed out wound to leg     Was a stable scab that was recently bumped and reopened. Wound can be cleansed and dressed with a mepilex. Spoke with bedside nursing.     Treatment Plan:     Coccyx wound: Every 3 days   Cleanse the area with NS and pat dry.  Apply No sting film barrier to periwound skin.  Cover wound with Sacral Mepilex (#[id number])  Change dressing Q 3 days.  Turn and reposition Q 2hrs side to side only.  Ensure  "pt has Joe-cushion while sitting up in the chair.  FYI- If pt has constant incontinent loose stools needing dressing changes Q shift please discontinue the Mepilex dressing and apply criticaid barrier paste BID and PRN.    Pressure Injury Prevention (PIP) Plan:  If patient is declining pressure injury prevention interventions: Explore reason why and address patient's concerns, Educate on pressure injury risk and prevention intervention(s), If patient is still declining, document \"informed refusal\" , and Ensure Care team is aware ( provider, charge nurse, etc)  Mattress: Follow bed algorithm, add Low Air Loss (Air+) mattress pump if skin is very moist or constantly moist.   HOB: Maintain at or below 30 degrees, unless contraindicated  Repositioning in bed: Every 1-2 hours  and Raise foot of bed prior to raising head of bed, to reduce patient sliding down (shear)  Heels: Keep elevated off mattress and Pillows under calves  Protective Dressing: Sacral Mepilex for prevention (#842184),  especially for the agitated patient   Positioning Equipment: pillows  Chair positioning: Assist patient to reposition hourly   If patient has a buttock pressure injury, or high risk for PI use chair cushion or SPS.  Moisture Management: Perineal cleansing /protection: Follow Incontinence Protocol, Avoid brief in bed, Clean and dry skin folds with bathing , Consider InterDry (#600783) between folds, and Moisturize dry skin  Under Devices: Inspect skin under all medical devices during skin inspection , Ensure tubes are stabilized without tension, and Ensure patient is not lying on medical devices or equipment when repositioned  Ask provider to discontinue device when no longer needed.        Orders: Written    RECOMMEND PRIMARY TEAM ORDER: None, at this time  Education provided: importance of repositioning, plan of care, and Off-loading pressure  Discussed plan of care with: Patient and Nurse  Notify WOC if wound(s) deteriorate.  Nursing " to notify the Provider(s) and re-consult the [facility] Nurse if new skin concern.    DATA:     Current support surface: Standard  ED cart  Containment of urine/stool: Incontinence Protocol  BMI: Body mass index is 29.62 kg/m .   Active diet order: Orders Placed This Encounter      Regular Diet Adult     Output: I/O last 3 completed shifts:  In: 800 [IV Piggyback:800]  Out: -      Labs:   Recent Labs   Lab 03/24/25  1454   HGB 13.7   WBC 11.5*     Pressure injury risk assessment:   Sensory Perception: 3-->slightly limited  Moisture: 3-->occasionally moist  Activity: 2-->chairfast  Mobility: 2-->very limited  Nutrition: 3-->adequate  Friction and Shear: 2-->potential problem  Anthony Score: 15    AYAD Ledbetter RN CWOCN  Pager no longer in use, please contact through [employer] group: [facility] [employer]

## 2025-03-26 ENCOUNTER — APPOINTMENT (OUTPATIENT)
Dept: RADIOLOGY | Facility: CLINIC | Age: 85
DRG: 552 | End: 2025-03-26
Attending: HOSPITALIST
Payer: MEDICARE

## 2025-03-26 ENCOUNTER — APPOINTMENT (OUTPATIENT)
Dept: OCCUPATIONAL THERAPY | Facility: CLINIC | Age: 85
DRG: 552 | End: 2025-03-26
Attending: HOSPITALIST
Payer: MEDICARE

## 2025-03-26 ENCOUNTER — APPOINTMENT (OUTPATIENT)
Dept: PHYSICAL THERAPY | Facility: CLINIC | Age: 85
DRG: 552 | End: 2025-03-26
Attending: HOSPITALIST
Payer: MEDICARE

## 2025-03-26 LAB
GLUCOSE BLDC GLUCOMTR-MCNC: 190 MG/DL (ref 70–99)
GLUCOSE BLDC GLUCOMTR-MCNC: 226 MG/DL (ref 70–99)
GLUCOSE BLDC GLUCOMTR-MCNC: 247 MG/DL (ref 70–99)
GLUCOSE BLDC GLUCOMTR-MCNC: 263 MG/DL (ref 70–99)

## 2025-03-26 PROCEDURE — 99231 SBSQ HOSP IP/OBS SF/LOW 25: CPT | Performed by: NURSE PRACTITIONER

## 2025-03-26 PROCEDURE — 250N000012 HC RX MED GY IP 250 OP 636 PS 637: Performed by: HOSPITALIST

## 2025-03-26 PROCEDURE — 97530 THERAPEUTIC ACTIVITIES: CPT | Mod: GP

## 2025-03-26 PROCEDURE — 99232 SBSQ HOSP IP/OBS MODERATE 35: CPT | Performed by: HOSPITALIST

## 2025-03-26 PROCEDURE — 97161 PT EVAL LOW COMPLEX 20 MIN: CPT | Mod: GP

## 2025-03-26 PROCEDURE — 250N000013 HC RX MED GY IP 250 OP 250 PS 637: Performed by: HOSPITALIST

## 2025-03-26 PROCEDURE — 97535 SELF CARE MNGMENT TRAINING: CPT | Mod: GO

## 2025-03-26 PROCEDURE — 73030 X-RAY EXAM OF SHOULDER: CPT | Mod: RT

## 2025-03-26 PROCEDURE — 120N000001 HC R&B MED SURG/OB

## 2025-03-26 PROCEDURE — 97166 OT EVAL MOD COMPLEX 45 MIN: CPT | Mod: GO

## 2025-03-26 PROCEDURE — 73060 X-RAY EXAM OF HUMERUS: CPT | Mod: RT

## 2025-03-26 PROCEDURE — 73070 X-RAY EXAM OF ELBOW: CPT | Mod: RT

## 2025-03-26 RX ORDER — DIAZEPAM 2 MG/1
2 TABLET ORAL 2 TIMES DAILY PRN
Status: DISCONTINUED | OUTPATIENT
Start: 2025-03-26 | End: 2025-04-01 | Stop reason: HOSPADM

## 2025-03-26 RX ADMIN — HYDROXYZINE HYDROCHLORIDE 25 MG: 25 TABLET ORAL at 20:51

## 2025-03-26 RX ADMIN — SENNOSIDES 8.6 MG: 8.6 TABLET, FILM COATED ORAL at 20:51

## 2025-03-26 RX ADMIN — SENNOSIDES AND DOCUSATE SODIUM 1 TABLET: 50; 8.6 TABLET ORAL at 00:26

## 2025-03-26 RX ADMIN — PREGABALIN 100 MG: 100 CAPSULE ORAL at 13:19

## 2025-03-26 RX ADMIN — LAMOTRIGINE 100 MG: 100 TABLET ORAL at 09:37

## 2025-03-26 RX ADMIN — TRIAMTERENE AND HYDROCHLOROTHIAZIDE 1 CAPSULE: 37.5; 25 CAPSULE ORAL at 09:28

## 2025-03-26 RX ADMIN — PREGABALIN 100 MG: 100 CAPSULE ORAL at 09:24

## 2025-03-26 RX ADMIN — MIRABEGRON 50 MG: 25 TABLET, FILM COATED, EXTENDED RELEASE ORAL at 09:36

## 2025-03-26 RX ADMIN — OXYCODONE 10 MG: 5 TABLET ORAL at 09:54

## 2025-03-26 RX ADMIN — OXYCODONE 10 MG: 5 TABLET ORAL at 22:08

## 2025-03-26 RX ADMIN — OXYCODONE 10 MG: 5 TABLET ORAL at 00:21

## 2025-03-26 RX ADMIN — INSULIN ASPART 3 UNITS: 100 INJECTION, SOLUTION INTRAVENOUS; SUBCUTANEOUS at 16:52

## 2025-03-26 RX ADMIN — DULOXETINE 60 MG: 30 CAPSULE, DELAYED RELEASE ORAL at 09:27

## 2025-03-26 RX ADMIN — PREGABALIN 100 MG: 100 CAPSULE ORAL at 20:51

## 2025-03-26 RX ADMIN — FERROUS SULFATE TAB 325 MG (65 MG ELEMENTAL FE) 325 MG: 325 (65 FE) TAB at 20:51

## 2025-03-26 RX ADMIN — SENNOSIDES 8.6 MG: 8.6 TABLET, FILM COATED ORAL at 09:27

## 2025-03-26 RX ADMIN — TOLTERODINE TARTRATE 2 MG: 1 TABLET ORAL at 20:50

## 2025-03-26 RX ADMIN — DIAZEPAM 2 MG: 2 TABLET ORAL at 22:08

## 2025-03-26 RX ADMIN — PREDNISONE 6 MG: 1 TABLET ORAL at 09:40

## 2025-03-26 RX ADMIN — INSULIN ASPART 2 UNITS: 100 INJECTION, SOLUTION INTRAVENOUS; SUBCUTANEOUS at 13:19

## 2025-03-26 RX ADMIN — TOLTERODINE TARTRATE 2 MG: 1 TABLET ORAL at 09:34

## 2025-03-26 RX ADMIN — PANTOPRAZOLE SODIUM 40 MG: 40 TABLET, DELAYED RELEASE ORAL at 09:27

## 2025-03-26 RX ADMIN — POLYETHYLENE GLYCOL 3350 17 G: 17 POWDER, FOR SOLUTION ORAL at 22:08

## 2025-03-26 RX ADMIN — HYDROXYZINE HYDROCHLORIDE 25 MG: 25 TABLET ORAL at 09:27

## 2025-03-26 RX ADMIN — LAMOTRIGINE 100 MG: 100 TABLET ORAL at 20:51

## 2025-03-26 RX ADMIN — BUSPIRONE HYDROCHLORIDE 7.5 MG: 7.5 TABLET ORAL at 09:38

## 2025-03-26 RX ADMIN — BUSPIRONE HYDROCHLORIDE 7.5 MG: 7.5 TABLET ORAL at 20:50

## 2025-03-26 ASSESSMENT — ACTIVITIES OF DAILY LIVING (ADL)
ADLS_ACUITY_SCORE: 69
ADLS_ACUITY_SCORE: 65
ADLS_ACUITY_SCORE: 69
ADLS_ACUITY_SCORE: 70
ADLS_ACUITY_SCORE: 65
ADLS_ACUITY_SCORE: 70
ADLS_ACUITY_SCORE: 71
ADLS_ACUITY_SCORE: 65
ADLS_ACUITY_SCORE: 70
ADLS_ACUITY_SCORE: 65
ADLS_ACUITY_SCORE: 69
ADLS_ACUITY_SCORE: 71
ADLS_ACUITY_SCORE: 65
ADLS_ACUITY_SCORE: 69
ADLS_ACUITY_SCORE: 71
ADLS_ACUITY_SCORE: 71
ADLS_ACUITY_SCORE: 65
ADLS_ACUITY_SCORE: 65
ADLS_ACUITY_SCORE: 70

## 2025-03-26 NOTE — PLAN OF CARE
"Goal Outcome Evaluation:      Plan of Care Reviewed With: patient    Overall Patient Progress: no changeOverall Patient Progress: no change     Patient is disorientated to time.VSS. BP (!) 147/64 (BP Location: Left arm)   Pulse 89   Temp 98.4  F (36.9  C) (Oral)   Resp 20   Ht 1.651 m (5' 5\")   LMP  (LMP Unknown)   SpO2 92%   BMI 29.62 kg/m    Complains of generalized pain 8/10. PRN medication given and pain decreased. Purewick in place. Urine pascale in color and odorous. Repositioned q2h. Dressing on Coccyx/buttock, CDI. 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 BG HS was 199, scheduled insulin given. No PIV in place, please see previous note.     Will continue plan of care.     Marlys Vaughn RN, BSN        "

## 2025-03-26 NOTE — PROGRESS NOTES
03/26/25 1002   Appointment Info   Signing Clinician's Name / Credentials (PT) Eric Mauro, POOJA   Quick Adds   Quick Adds Certification   Living Environment   People in Home alone   Current Living Arrangements apartment   Home Accessibility no concerns   Self-Care   Usual Activity Tolerance fair   Current Activity Tolerance poor   Equipment Currently Used at Home wheelchair, manual;walker, rolling   Fall history within last six months yes   Number of times patient has fallen within last six months 4   Activity/Exercise/Self-Care Comment normallly able to transfer self chair<>bed, has PCA on first floor of building, difficult to get answers from paitenviral   General Information   Onset of Illness/Injury or Date of Surgery 03/24/25   Referring Physician Dr. Kristian Stearns   Patient/Family Therapy Goals Statement (PT) wants to return to home   Pertinent History of Current Problem (include personal factors and/or comorbidities that impact the POC) 84 year old female presenting for leg pain and weakness following a fall.  She is clinically stable.  She has a known history of lumbar spinal stenosis as well as previously demonstrated L5 nerve impingement.  By her report, she has had a chronic worsening of her functional status over time.   Existing Precautions/Restrictions spinal   Cognition   Affect/Mental Status (Cognition) WFL   Follows Commands (Cognition) repetition of directions required;WFL   Memory Deficit (Cognition) short-term memory   Cognitive Status Comments Paitent very verbal, pocket talker worked relatively well, but patient struggles to process or understand what is being told   Range of Motion (ROM)   Range of Motion ROM is WFL   Strength (Manual Muscle Testing)   Strength Comments General weakness   Transfers   Transfers sit-stand transfer   Sit-Stand Transfer   Sit-Stand Kleberg (Transfers) verbal cues;moderate assist (50% patient effort);2 person assist   Assistive Device (Sit-Stand Transfers)  walker, front-wheeled   Balance   Balance no deficits were identified   Clinical Impression   Criteria for Skilled Therapeutic Intervention Yes, treatment indicated   PT Diagnosis (PT) Impaired functional mobility   Influenced by the following impairments weakness, pain   Functional limitations due to impairments transfers, gait   Clinical Presentation (PT Evaluation Complexity) stable   Clinical Presentation Rationale Patient presents as medically diagnosed   Clinical Decision Making (Complexity) low complexity   Planned Therapy Interventions (PT) home exercise program;gait training;transfer training;patient/family education   Risk & Benefits of therapy have been explained patient;risks/benefits reviewed   PT Total Evaluation Time   PT Eval, Low Complexity Minutes (96991) 15   Therapy Certification   Start of care date 03/26/25   Certification date from 03/26/25   Certification date to 04/02/25   Physical Therapy Goals   PT Frequency 5x/week   PT Predicted Duration/Target Date for Goal Attainment 04/02/25   PT Goals Transfers;Gait   PT: Transfers Minimal assist;Sit to/from stand;Within precautions   PT: Gait Minimal assist;Rolling walker;10 feet   Interventions   Interventions Quick Adds Therapeutic Activity;Gait Training   Therapeutic Activity   Therapeutic Activities: dynamic activities to improve functional performance Minutes (33891) 15   Symptoms Noted During/After Treatment Fatigue   Treatment Detail/Skilled Intervention Paitent needing max assist of 2 with vc to log roll onto R side.  Patient difficult to cue as paitnet talks and struggles in listening or trying to follow cueing .  Patient seen with use pocket talker and states she could hear commands but struggling to listen   PT Discharge Planning   PT Plan progress with christine gait, LE strengthening   PT Discharge Recommendation (DC Rec) Transitional Care Facility   PT Rationale for DC Rec Patient lives alone and needing assist of 2 for all transfers    PT Brief overview of current status Patient needing mod-max assist of 2 for all mobility   PT Total Distance Amb During Session (feet) 0   PT Equipment Needed at Discharge walker, rolling   Physical Therapy Time and Intention   Timed Code Treatment Minutes 15   Total Session Time (sum of timed and untimed services) 30

## 2025-03-26 NOTE — PLAN OF CARE
Problem: Adult Inpatient Plan of Care  Goal: Optimal Comfort and Wellbeing  Outcome: Progressing  Intervention: Monitor Pain and Promote Comfort  Recent Flowsheet Documentation  Taken 3/26/2025 0438 by Eden Graham, RN  Pain Management Interventions:   rest   repositioned  Intervention: Provide Person-Centered Care  Recent Flowsheet Documentation  Taken 3/26/2025 0021 by Eden Graham, RN  Trust Relationship/Rapport:   care explained   choices provided   questions answered   questions encouraged   Goal Outcome Evaluation:       Pt is alert and oriented x4. C/o generalized pain all over, managed with Oxycodone 10mg with good effect. Able to sleep well overnight. Repositioned for pressure relief. VSS, weaned off O2, sating 95% on RA. Purewick on, voiding pascale colored urine.

## 2025-03-26 NOTE — PROVIDER NOTIFICATION
Patient has pulled out x2 PIV. Patient is refusing PIV. Writer notified MD. MD stated, OK with leaving PIV out.     Marlys Vaughn RN, BSN

## 2025-03-26 NOTE — PROGRESS NOTES
"   03/26/25 1005   Appointment Info   Signing Clinician's Name / Credentials (OT) SONALI Sahni/L, CLT   Rehab Comments (OT) OT eval   Living Environment   People in Home alone   Current Living Arrangements apartment   Home Accessibility no concerns   Self-Care   Usual Activity Tolerance fair   Current Activity Tolerance poor   Equipment Currently Used at Home grab bar, toilet;walker, rolling;wheelchair, manual   Fall history within last six months yes   Activity/Exercise/Self-Care Comment Pt is typically independent with BADL at home. Pt is able to call PCA who lives in apt building on first floor and can come up to 7th floor.   General Information   Onset of Illness/Injury or Date of Surgery 03/26/25   Referring Physician Dr. Stearns   Patient/Family Therapy Goal Statement (OT) states \"TCU is hell, but it works.\"   Additional Occupational Profile Info/Pertinent History of Current Problem 84 year old female with history of spinal stenosis, chronic pain, DM2, CKD, sensorineural hearing loss, lower extremity edema, cauda equina compression who was admitted on 3/24/2025 for evaluation after a fall. Neurosurgery was consulted for lower extremity weakness, lumbar spinal stenosis.   Existing Precautions/Restrictions fall   Limitations/Impairments hearing;safety/cognitive   General Observations and Info is very [place] [employer], pocket talker appeared to work during session.   Cognitive Status Examination   Affect/Mental Status (Cognitive) confused;anxious   Memory Deficit moderate deficit   Cognitive Status Comments Appears to decline listening at times.   Visual Perception   Visual Impairment/Limitations WFL   Sensory   Sensory Quick Adds sensation intact   Pain Assessment   Patient Currently in Pain No   Posture   Posture not impaired   Range of Motion Comprehensive   General Range of Motion no range of motion deficits identified   Strength Comprehensive (MMT)   Comment, General Manual Muscle Testing (MMT) Assessment " generalized weakness   Muscle Tone Assessment   Muscle Tone Quick Adds No deficits were identified   Coordination   Upper Extremity Coordination No deficits were identified   Bed Mobility   Comment (Bed Mobility) Dependent   Transfers   Transfer Comments Dependent   Balance   Balance Comments decreased   Activities of Daily Living   BADL Assessment/Intervention toileting   Toileting   Comment, (Toileting) using [employer]   [place] Level (Toileting) dependent (less than 25% patient effort)   Clinical Impression   Criteria for Skilled Therapeutic Interventions Met (OT) Yes, treatment indicated   OT Diagnosis decreased ADL independence/safety.   Influenced by the following impairments weakness of both LEs   OT Problem List-Impairments impacting ADL activity tolerance impaired;balance;cognition;communication;fear & anxiety;flexibility;hearing;mobility;strength;pain   Assessment of Occupational Performance 5 or more Performance Deficits   Identified Performance Deficits dressing, toileting, bathing, functional mobility, bed mobility cog with all   Planned Therapy Interventions (OT) ADL retraining;cognition;bed mobility training;balance training;ROM;strengthening;transfer training;progressive activity/exercise;risk factor education   Clinical Decision Making Complexity (OT) detailed assessment/moderate complexity   Risk & Benefits of therapy have been explained care plan/treatment goals reviewed;patient   OT Total Evaluation Time   OT Eval, Moderate Complexity Minutes (81775) 10   OT Goals   Therapy Frequency (OT) 5 times/week   OT Predicted Duration/Target Date for Goal Attainment 04/03/25   OT Goals Toilet Transfer/Toileting;Bed Mobility   OT: Bed Mobility Moderate assist   OT: Toilet Transfer/Toileting Moderate assist;toilet transfer   Interventions   Interventions Quick Adds Self-Care/Home Management   Self-Care/Home Management   Self-Care/Home Mgmt/ADL, Compensatory, Meal Prep Minutes (36683) 20   Symptoms Noted  During/After Treatment (Meal Preparation/Planning Training) fatigue;increased pain   Treatment Detail/Skilled Intervention Pt in bed and introduced pocket talker and pt willing to try. Pt appeared able to follow directions approx 75% of opportunities, but appeared anxious. Pt needed slow, clear, short instructions. Pt needed incr time and effort d/t pain/decr cog and log roll with HOB elevated/rail/MaxAx2/cues for sequencing, and MaxAx2 for supine to sit and able to sit EOB with SBA and MaxAx2/cues for sequencing for STS and ModAx2 for few steps to chair/FWW/cues for sequencing. ModAx2 for stand to sit and Christina for adjusting in chair. Pt needed incr time/effort for education d/t decr hearing, decr cog and anxiety. Alarms on/call light in reach.   OT Discharge Planning   OT Plan Pocket talker:2-3word sentences/slow; log roll;spine prec;Ax2-legs can buckle;LB dress;ADL   OT Discharge Recommendation (DC Rec) (S)  Transitional Care Facility   OT Rationale for DC Rec Lives alone, well below baseline, weakness   OT Brief overview of current status MaxAx2 for func mob/bed mob/ADL   OT Total Distance Amb During Session (feet) 1   Total Session Time   Timed Code Treatment Minutes 20   Total Session Time (sum of timed and untimed services) 30

## 2025-03-26 NOTE — PROGRESS NOTES
[facility]    Medicine Progress Note - Hospitalist Service    Date of Admission:  3/24/2025    Assessment & Plan   Jumana Yang is a 84 year old female admitted with lower extremity pain and weakness.  Neurosurgery consulted and is offering the patient a decompression.  Decision on surgical intervention is still being determined.  For now, continue with pain management.    # Lumbar spinal canal stenosis  # Lower extremity weakness  - neurosurgery consulted  - PT/OT  - tylenol, oxycodone, methocarbamol prn  - pregabalin    # Right upper arm pain  - XR    # Coccyx wound  - WOC consult    # Diabetes  - ISS    # HTN    # COPD    # Meniere's disease  - chronic prednisone  - triamterene/hydrochlorothiazide    # Depression  # Anxiety  # Bipolar disorder  - buspirone  - duloxetine  - hydroxyzine  - lamotrigine    Addendum:  Nursing reported patient took her own supply of diazepam, a single 2mg tablet.  Patient's home regimen was resumed, to start at 6pm tonight (2mg twice daily as needed).    Addendum:  Discussed timing for surgical intervention with neurosurgery.  They communicate that surgery can happen with close outpatient followup and is not a specific barrier to discharge.  They also clarified that the coccyx wound is not a specific barrier to surgical intervention and will need ongoing attention with WOC.    XR right upper extremity were unremarkable.    Therefore, patient is medically ready for discharge when appropriate disposition is arranged.          Diet: Regular Diet Adult      Teran Catheter: Not present  Lines: None     Cardiac Monitoring: None  Code Status: No CPR- Do NOT Intubate      Clinically Significant Risk Factors Present on Admission        # Hypokalemia: Lowest K = 3.3 mmol/L in last 2 days, will replace as needed            # Hypertension: Noted on problem list               # Financial/Environmental Concerns:           Social Drivers of Health    Tobacco Use: Medium Risk  "(3/24/2025)    Patient History     Smoking Tobacco Use: Former     Smokeless Tobacco Use: Former          Disposition Plan     Medically Ready for Discharge: Anticipated in 2-4 Days             Kristian Stearns MD  Hospitalist Service  [facility]  Securely message with [employer] (more info)  Text page via [email or web address] Paging/Directory   ______________________________________________________________________    Interval History   States that \"everything hurts\".  Right arm pain due to fall is a little worse.  Contemplating decision to pursue surgery.    Physical Exam   Vital Signs: Temp: 98.8  F (37.1  C) Temp src: Oral BP: 132/59 Pulse: 80   Resp: 19 SpO2: 95 % O2 Device: None (Room air) Oxygen Delivery: 2 LPM  Weight: 0 lbs 0 oz    Gen:  lying in bed in no extremis  Neuro:  alert, conversant; anxious, tangential at times  CV:  nl rate, regular rhythm to palpation  Pulm:  no acute reps distress, ctab anteriorly  GI:  abdomen NTTP  MSK:  TTP at the right upper arm    Medical Decision Making             Data     " N/A

## 2025-03-26 NOTE — PROGRESS NOTES
Neurosurgery Progress Note:     Jumana Yang is a 84 year old female with history of spinal stenosis, chronic pain, DM2, CKD, sensorineural hearing loss, lower extremity edema, cauda equina compression who was admitted on 3/24/2025 for evaluation after a fall. Neurosurgery was consulted for lower extremity weakness, lumbar spinal stenosis.     24 hours events: No events overnight.  Communication very challenging secondary to patient being hard of hearing and somewhat scattered thought process.  However patient does seem agreeable to surgical intervention at this point in time.     On exam:  Mental status:  Alert and Oriented x 3, speech is fluent. Very hard of hearing but able to read lips  Motor:    Iliopsoas  (hip flexion)               Right: 2/5  Left:  3/5  Quadriceps  (knee extension)       Right:  3/5  Left: 3/5  Hamstrings  (knee flexion)            Right:  3/5  Left:  3+/5  Gastroc Soleus  (PF)                          Right:  5/5  Left:  5/5  Tibialis Ant  (DF)                          Right:  5/5  Left:  5/5  Sensation: Chronic leg tingling  Reflexes:   Few beats of clonus right side.     PLAN:  -Patient seems agreeable to having L2-4 laminectomy with Dr. Blake.  Will need to have additional discussions with son.  -From a neurosurgery standpoint would be okay with patient discharge and prompt follow-up this week next week in clinic to further discuss surgical intervention.  -If patient remains admitted we will have continued discussions Thursday and Friday of this week.  -We would likely delay surgery until patient's pressure injury on her coccyx has healed.     Luis Antonio Rojas [facility] Neurosurgery  [facility]  Vocera messaging strongly preferred  Please always look up on-call provider before messaging/paging      Case discussed with Dr. Blake  -I have spent 25 minutes providing care for this patient.      Dragon Disclosure   This was created at least in part with a voice  recognition software. Mistakes/typos may be present.

## 2025-03-27 ENCOUNTER — APPOINTMENT (OUTPATIENT)
Dept: OCCUPATIONAL THERAPY | Facility: CLINIC | Age: 85
DRG: 552 | End: 2025-03-27
Payer: MEDICARE

## 2025-03-27 ENCOUNTER — APPOINTMENT (OUTPATIENT)
Dept: RADIOLOGY | Facility: CLINIC | Age: 85
DRG: 552 | End: 2025-03-27
Attending: HOSPITALIST
Payer: MEDICARE

## 2025-03-27 VITALS
WEIGHT: 210.32 LBS | HEIGHT: 65 IN | RESPIRATION RATE: 18 BRPM | SYSTOLIC BLOOD PRESSURE: 132 MMHG | DIASTOLIC BLOOD PRESSURE: 61 MMHG | TEMPERATURE: 97.3 F | HEART RATE: 80 BPM | BODY MASS INDEX: 35.04 KG/M2 | OXYGEN SATURATION: 92 %

## 2025-03-27 LAB
GLUCOSE BLDC GLUCOMTR-MCNC: 191 MG/DL (ref 70–99)
GLUCOSE BLDC GLUCOMTR-MCNC: 199 MG/DL (ref 70–99)
GLUCOSE BLDC GLUCOMTR-MCNC: 249 MG/DL (ref 70–99)
GLUCOSE BLDC GLUCOMTR-MCNC: 249 MG/DL (ref 70–99)
GLUCOSE BLDC GLUCOMTR-MCNC: 252 MG/DL (ref 70–99)

## 2025-03-27 PROCEDURE — 250N000013 HC RX MED GY IP 250 OP 250 PS 637: Performed by: HOSPITALIST

## 2025-03-27 PROCEDURE — 97535 SELF CARE MNGMENT TRAINING: CPT | Mod: GO

## 2025-03-27 PROCEDURE — 250N000013 HC RX MED GY IP 250 OP 250 PS 637: Performed by: STUDENT IN AN ORGANIZED HEALTH CARE EDUCATION/TRAINING PROGRAM

## 2025-03-27 PROCEDURE — 250N000012 HC RX MED GY IP 250 OP 636 PS 637: Performed by: HOSPITALIST

## 2025-03-27 PROCEDURE — 99232 SBSQ HOSP IP/OBS MODERATE 35: CPT | Performed by: HOSPITALIST

## 2025-03-27 PROCEDURE — 120N000001 HC R&B MED SURG/OB

## 2025-03-27 PROCEDURE — 250N000011 HC RX IP 250 OP 636: Performed by: HOSPITALIST

## 2025-03-27 PROCEDURE — 74018 RADEX ABDOMEN 1 VIEW: CPT

## 2025-03-27 RX ORDER — ENOXAPARIN SODIUM 100 MG/ML
40 INJECTION SUBCUTANEOUS DAILY
Status: DISCONTINUED | OUTPATIENT
Start: 2025-03-27 | End: 2025-04-01 | Stop reason: HOSPADM

## 2025-03-27 RX ORDER — POLYETHYLENE GLYCOL 3350 17 G/17G
17 POWDER, FOR SOLUTION ORAL 2 TIMES DAILY
Status: DISCONTINUED | OUTPATIENT
Start: 2025-03-27 | End: 2025-03-29

## 2025-03-27 RX ORDER — BISACODYL 10 MG
10 SUPPOSITORY, RECTAL RECTAL ONCE
Status: COMPLETED | OUTPATIENT
Start: 2025-03-27 | End: 2025-03-27

## 2025-03-27 RX ORDER — EMOLLIENT BASE
CREAM (GRAM) TOPICAL DAILY PRN
Status: DISCONTINUED | OUTPATIENT
Start: 2025-03-27 | End: 2025-04-01 | Stop reason: HOSPADM

## 2025-03-27 RX ADMIN — BUSPIRONE HYDROCHLORIDE 7.5 MG: 7.5 TABLET ORAL at 09:33

## 2025-03-27 RX ADMIN — MIRABEGRON 50 MG: 25 TABLET, FILM COATED, EXTENDED RELEASE ORAL at 09:34

## 2025-03-27 RX ADMIN — OXYCODONE 10 MG: 5 TABLET ORAL at 09:32

## 2025-03-27 RX ADMIN — ROSUVASTATIN CALCIUM 10 MG: 10 TABLET, FILM COATED ORAL at 09:33

## 2025-03-27 RX ADMIN — INSULIN ASPART 2 UNITS: 100 INJECTION, SOLUTION INTRAVENOUS; SUBCUTANEOUS at 09:06

## 2025-03-27 RX ADMIN — SENNOSIDES 8.6 MG: 8.6 TABLET, FILM COATED ORAL at 09:33

## 2025-03-27 RX ADMIN — HYDROXYZINE HYDROCHLORIDE 25 MG: 25 TABLET ORAL at 20:57

## 2025-03-27 RX ADMIN — ENOXAPARIN SODIUM 40 MG: 40 INJECTION SUBCUTANEOUS at 14:58

## 2025-03-27 RX ADMIN — PREDNISONE 6 MG: 1 TABLET ORAL at 09:33

## 2025-03-27 RX ADMIN — BUSPIRONE HYDROCHLORIDE 7.5 MG: 7.5 TABLET ORAL at 20:51

## 2025-03-27 RX ADMIN — POLYETHYLENE GLYCOL 3350 17 G: 17 POWDER, FOR SOLUTION ORAL at 20:55

## 2025-03-27 RX ADMIN — FERROUS SULFATE TAB 325 MG (65 MG ELEMENTAL FE) 325 MG: 325 (65 FE) TAB at 20:54

## 2025-03-27 RX ADMIN — PREGABALIN 100 MG: 100 CAPSULE ORAL at 21:11

## 2025-03-27 RX ADMIN — METHOCARBAMOL TABLETS 750 MG: 750 TABLET, COATED ORAL at 05:19

## 2025-03-27 RX ADMIN — HYDROXYZINE HYDROCHLORIDE 25 MG: 25 TABLET ORAL at 09:33

## 2025-03-27 RX ADMIN — INSULIN ASPART 3 UNITS: 100 INJECTION, SOLUTION INTRAVENOUS; SUBCUTANEOUS at 17:23

## 2025-03-27 RX ADMIN — TOLTERODINE TARTRATE 2 MG: 1 TABLET ORAL at 09:33

## 2025-03-27 RX ADMIN — OXYCODONE 10 MG: 5 TABLET ORAL at 21:19

## 2025-03-27 RX ADMIN — TRIAMTERENE AND HYDROCHLOROTHIAZIDE 1 CAPSULE: 37.5; 25 CAPSULE ORAL at 09:33

## 2025-03-27 RX ADMIN — TOLTERODINE TARTRATE 2 MG: 1 TABLET ORAL at 20:54

## 2025-03-27 RX ADMIN — OXYCODONE 10 MG: 5 TABLET ORAL at 16:14

## 2025-03-27 RX ADMIN — LAMOTRIGINE 100 MG: 100 TABLET ORAL at 09:42

## 2025-03-27 RX ADMIN — INSULIN ASPART 3 UNITS: 100 INJECTION, SOLUTION INTRAVENOUS; SUBCUTANEOUS at 12:33

## 2025-03-27 RX ADMIN — HYDROXYZINE HYDROCHLORIDE 10 MG: 10 TABLET ORAL at 12:33

## 2025-03-27 RX ADMIN — LAMOTRIGINE 100 MG: 100 TABLET ORAL at 20:54

## 2025-03-27 RX ADMIN — PREGABALIN 100 MG: 100 CAPSULE ORAL at 14:58

## 2025-03-27 RX ADMIN — DULOXETINE 60 MG: 30 CAPSULE, DELAYED RELEASE ORAL at 09:33

## 2025-03-27 RX ADMIN — SENNOSIDES 8.6 MG: 8.6 TABLET, FILM COATED ORAL at 20:55

## 2025-03-27 RX ADMIN — PREGABALIN 100 MG: 100 CAPSULE ORAL at 09:33

## 2025-03-27 RX ADMIN — PANTOPRAZOLE SODIUM 40 MG: 40 TABLET, DELAYED RELEASE ORAL at 09:33

## 2025-03-27 RX ADMIN — FERROUS SULFATE TAB 325 MG (65 MG ELEMENTAL FE) 325 MG: 325 (65 FE) TAB at 09:33

## 2025-03-27 ASSESSMENT — ACTIVITIES OF DAILY LIVING (ADL)
ADLS_ACUITY_SCORE: 70
ADLS_ACUITY_SCORE: 70
ADLS_ACUITY_SCORE: 66
ADLS_ACUITY_SCORE: 64
ADLS_ACUITY_SCORE: 70
ADLS_ACUITY_SCORE: 66
ADLS_ACUITY_SCORE: 64
DEPENDENT_IADLS:: CLEANING;LAUNDRY;SHOPPING;MEDICATION MANAGEMENT;MONEY MANAGEMENT;TRANSPORTATION
ADLS_ACUITY_SCORE: 64
ADLS_ACUITY_SCORE: 66
ADLS_ACUITY_SCORE: 66
ADLS_ACUITY_SCORE: 64
ADLS_ACUITY_SCORE: 64
ADLS_ACUITY_SCORE: 62
ADLS_ACUITY_SCORE: 64

## 2025-03-27 NOTE — PLAN OF CARE
Goal Outcome Evaluation:       Patient is disoriented to time. VSS on RA. Pain is controlled on PO analgesisc. PRN for pain, anxiety and constipation given (see MAR). Patient ambulates with assist of 1-2 with belt and walker. Tolerating a regular diet and voiding spontaneously. Purewick draining clear yellow output. Patient is resting in bed, is safe and call light is within reach. Alicia Morales RN      Problem: Adult Inpatient Plan of Care  Goal: Optimal Comfort and Wellbeing  Outcome: Progressing  Intervention: Provide Person-Centered Care  Recent Flowsheet Documentation  Taken 3/26/2025 2045 by Alicia Morales RN  Trust Relationship/Rapport:   care explained   empathic listening provided   reassurance provided   thoughts/feelings acknowledged     Problem: Skin Injury Risk Increased  Goal: Skin Health and Integrity  Outcome: Progressing  Intervention: Plan: Nurse Driven Intervention: Moisture Management  Recent Flowsheet Documentation  Taken 3/26/2025 2045 by Alicia Morales RN  Moisture Interventions: Urinary collection device  Intervention: Plan: Nurse Driven Intervention: Friction and Shear  Recent Flowsheet Documentation  Taken 3/26/2025 2045 by Alicia Morales RN  Friction/Shear Interventions: HOB 30 degrees or less  Intervention: Optimize Skin Protection  Recent Flowsheet Documentation  Taken 3/26/2025 2045 by Alicia Morales RN  Activity Management:   up to bedside commode   back to bed  Head of Bed (HOB) Positioning: HOB at 30-45 degrees

## 2025-03-27 NOTE — PLAN OF CARE
Problem: Adult Inpatient Plan of Care  Goal: Optimal Comfort and Wellbeing  Outcome: Progressing  Intervention: Provide Person-Centered Care  Recent Flowsheet Documentation  Taken 3/27/2025 0930 by Terri Bush RN  Trust Relationship/Rapport:   choices provided   care explained     Problem: Delirium  Goal: Improved Attention and Thought Clarity  Outcome: Progressing  Intervention: Maximize Cognitive Function  Recent Flowsheet Documentation  Taken 3/27/2025 0930 by Terri Bush RN  Sensory Stimulation Regulation: care clustered  Reorientation Measures:   clock in view   calendar in view     Problem: Skin Injury Risk Increased  Goal: Skin Health and Integrity  Outcome: Progressing  Intervention: Plan: Nurse Driven Intervention: Moisture Management  Recent Flowsheet Documentation  Taken 3/27/2025 0930 by Terri Bush RN  Moisture Interventions: Urinary collection device  Intervention: Plan: Nurse Driven Intervention: Friction and Shear  Recent Flowsheet Documentation  Taken 3/27/2025 0930 by Terri Bush RN  Friction/Shear Interventions: HOB 30 degrees or less  Intervention: Optimize Skin Protection  Recent Flowsheet Documentation  Taken 3/27/2025 0930 by Terri Bush RN  Pressure Reduction Techniques: frequent weight shift encouraged  Pressure Reduction Devices: positioning supports utilized  Skin Protection: adhesive use limited  Activity Management:   activity adjusted per tolerance   back to bed  Head of Bed (HOB) Positioning: HOB at 45 degrees     Goal Outcome Evaluation:  Pt is A&O, calls appropriately for needs and is an assist 1 with GB/Walker for ambulation. Vitals signs are stable, afebrile, oxygen is on room air. Pt complains of pain 9/10, to back, PRN and scheduled medication given for this, with relief. Pt is tolerating regular diet but now NPO. Pt is voiding spontaneously, last bowel movement 3/23/2025. Glucose checks ACHS, insulin coverage per sliding scale. Alarms in place.      Terri  CHERELLE Bush  March 27, 2025, 3:24 PM

## 2025-03-27 NOTE — PROGRESS NOTES
Neurosurgery quick note:    Okay with patient discharged to TCU.  We will arrange outpatient follow-up next week April 1 with planned surgical intervention week of April 7th    Neurosurgery to sign off

## 2025-03-27 NOTE — CONSULTS
"Care Management Initial Consult    General Information  Assessment completed with: Patient and Markel (PCA)  Type of CM/SW Visit: Initial Assessment    Primary Care Provider verified and updated as needed: Yes   Readmission within the last 30 days: no previous admission in last 30 days      Reason for Consult: discharge planning  Advance Care Planning: Advance Care Planning Reviewed:  (does not have)          Communication Assessment  Patient's communication style: spoken language (English or Bilingual)    Hearing Difficulty or Deaf: yes       Living Environment:   People in home: alone     Current living Arrangements: apartment      Able to return to prior arrangements: no       Family/Social Support:  Care provided by: self, child(pamela)  Provides care for: no one, unable/limited ability to care for self  Marital Status: Single  Support system: Children, Friend          Description of Support System: Supportive, Involved         Current Resources:   Patient receiving home care services: Yes  Skilled Home Care Services: Skilled Nursing, Physical Therapy  Community Resources: PCA (15 hours per week of PCA)  Equipment currently used at home: grab bar, tub/shower, wheelchair, manual  Supplies currently used at home: Diabetic Supplies      Employment/Financial:  Financial Concerns: none        Functional Status:  Prior to admission patient needed assistance:   Dependent ADLs:: Wheelchair-independent  Dependent IADLs:: Cleaning, Laundry, Shopping, Medication Management, Money Management, Transportation (family or metro mobility)       Mental Health Status:  Mental Health Status: No Current Concerns         Chemical Dependency Status:  Chemical Dependency Status: No Current Concerns             Values/Beliefs:  Spiritual, Cultural Beliefs, Gnosticism Practices, Values that affect care:  \"believe in God, born Sikhism, believes in [place]\"              Discussed  Partnership in Safe Discharge Planning  document with " patient/family: Yes: patient and Markel (friend/PCA)        Additional Information:  CM reviewed chart. Patient lives alone in an apartment. She has a PCA (15 hours a week) and son assists as needed. Patient receiving home care services: Yes  Skilled Home Care Services: Skilled Nursing, Physical Therapy. Community Resources: PCA (15 hours per week of PCA). Equipment currently used at home: grab bar, tub/shower, wheelchair, manual  Supplies currently used at home: Diabetic Supplies. Prior to admission patient needed assistance: Dependent ADLs:: Wheelchair-independent. Dependent IADLs:: Cleaning, Laundry, Shopping, Medication Management, Money Management, Transportation (family or metro mobility). Financial Concerns: none/denied. Mental Health Status: No Current Concerns/denied. Chemical Dependency Status: No Current Concerns/denied. CM discussed the recommendations for TCU. Patient is agreeable to TCU referrals being sent. Prefers a location close to home. Patient will need transportation to TCU. CM will follow.         Next Steps: TCU referrals, PAS and transportation     TCU referrals pending  [facility] ([facility])  [address] [address]       [facility]    [address] [place] [address]        [facility]   [address] [address]       [facility] ([facility]   [address] [place] [address]       [facility] ([facility])   [address] [address]           CM received a call from [facility]  that patient is open for services. Home care is requesting an update regarding discharge plan- TCU vs home.       PT recommendations: Transitional Care Facility   OT recommendations: Transitional Care Facility           [facility]  Phone [phone]          Daniella Mcallister RN  Care Coordinator

## 2025-03-27 NOTE — PROGRESS NOTES
"[facility]    Medicine Progress Note - Hospitalist Service    Date of Admission:  3/24/2025    Assessment & Plan   Jumana Yang is a 84 year old female admitted with lower extremity pain and weakness.  Neurosurgery consulted and is offering the patient a decompression as an outpatient.  Suspect abdominal pain is related to constipation.  Check XR and suppository ordered.    # Lumbar spinal canal stenosis  # Lower extremity weakness  - outpatient neurosurgery followup  - PT/OT  - tylenol, oxycodone, methocarbamol prn  - pregabalin    # Constipation  - XR abdomen  - suppository    # Coccyx wound  - WOC consult    # Diabetes  - ISS    # HTN    # COPD    # Meniere's disease  - chronic prednisone  - triamterene/hydrochlorothiazide    # Depression  # Anxiety  # Bipolar disorder  - buspirone  - duloxetine  - hydroxyzine  - lamotrigine            Diet: Regular Diet Adult      Teran Catheter: Not present  Lines: None     Cardiac Monitoring: None  Code Status: No CPR- Do NOT Intubate      Clinically Significant Risk Factors                   # Hypertension: Noted on problem list            # Obesity: Estimated body mass index is 35 kg/m  as calculated from the following:    Height as of this encounter: 1.651 m (5' 5\").    Weight as of this encounter: 95.4 kg (210 lb 5.1 oz)., PRESENT ON ADMISSION     # Financial/Environmental Concerns:           Social Drivers of Health    Tobacco Use: Medium Risk (3/24/2025)    Patient History     Smoking Tobacco Use: Former     Smokeless Tobacco Use: Former          Disposition Plan     Medically Ready for Discharge: Ready Now             Kristian Stearns MD  Hospitalist Service  [facility]  Securely message with [employer] (more info)  Text page via [employer] Paging/Directory   ______________________________________________________________________    Interval History   Patient was able to take a few steps.  Denies pain in legs at rest.  Legs are " painful with movement.  Reports having lower back pain.  Denies numbness in legs.  Right toes are tingling which is chronic for her.  Last bowel movement was four days ago.    Patient says she doesn't want to pursue surgery.      Physical Exam   Vital Signs: Temp: 98.3  F (36.8  C) Temp src: Oral BP: 131/61 Pulse: 83   Resp: 16 SpO2: (!) 91 % O2 Device: None (Room air)    Weight: 210 lbs 5.1 oz    Gen:  lying in bed in no extremis  Neuro:  alert, conversant; strength L hip 4/5 to flex/ext, R hip 4/5 extension, unable to flex R hip; L knee 4/5 to flex/ext, R knee 4/5 flex ext, L ankle 5/5 flex/ext, R ankle 5/5 flex/ext  CV:  nl rate, regular rhythm  Pulm:  no acute resp distress, ctab anteriorly  GI:  right sided abdominal TTP    Medical Decision Making             Data

## 2025-03-27 NOTE — PLAN OF CARE
Goal Outcome Evaluation:         Pt is alert and oriented x4. Denies pain. No SOB.  Purewick in situ, voiding well. [place]. VSS on RA. Pending discharge to TCU.

## 2025-03-27 NOTE — PROGRESS NOTES
PT recommendations: Transitional Care Facility   OT recommendations: Transitional Care Facility

## 2025-03-28 ENCOUNTER — APPOINTMENT (OUTPATIENT)
Dept: PHYSICAL THERAPY | Facility: CLINIC | Age: 85
DRG: 552 | End: 2025-03-28
Payer: MEDICARE

## 2025-03-28 ENCOUNTER — APPOINTMENT (OUTPATIENT)
Dept: OCCUPATIONAL THERAPY | Facility: CLINIC | Age: 85
DRG: 552 | End: 2025-03-28
Payer: MEDICARE

## 2025-03-28 LAB
GLUCOSE BLDC GLUCOMTR-MCNC: 164 MG/DL (ref 70–99)
GLUCOSE BLDC GLUCOMTR-MCNC: 181 MG/DL (ref 70–99)
GLUCOSE BLDC GLUCOMTR-MCNC: 184 MG/DL (ref 70–99)
GLUCOSE BLDC GLUCOMTR-MCNC: 248 MG/DL (ref 70–99)
GLUCOSE BLDC GLUCOMTR-MCNC: 317 MG/DL (ref 70–99)

## 2025-03-28 PROCEDURE — 250N000012 HC RX MED GY IP 250 OP 636 PS 637: Performed by: HOSPITALIST

## 2025-03-28 PROCEDURE — 97535 SELF CARE MNGMENT TRAINING: CPT | Mod: GO

## 2025-03-28 PROCEDURE — 250N000013 HC RX MED GY IP 250 OP 250 PS 637: Performed by: HOSPITALIST

## 2025-03-28 PROCEDURE — 250N000013 HC RX MED GY IP 250 OP 250 PS 637: Performed by: STUDENT IN AN ORGANIZED HEALTH CARE EDUCATION/TRAINING PROGRAM

## 2025-03-28 PROCEDURE — 99232 SBSQ HOSP IP/OBS MODERATE 35: CPT | Performed by: HOSPITALIST

## 2025-03-28 PROCEDURE — 250N000011 HC RX IP 250 OP 636: Performed by: HOSPITALIST

## 2025-03-28 PROCEDURE — 97530 THERAPEUTIC ACTIVITIES: CPT | Mod: GP

## 2025-03-28 PROCEDURE — 120N000001 HC R&B MED SURG/OB

## 2025-03-28 RX ORDER — BISACODYL 10 MG
10 SUPPOSITORY, RECTAL RECTAL ONCE
Status: COMPLETED | OUTPATIENT
Start: 2025-03-28 | End: 2025-03-28

## 2025-03-28 RX ADMIN — OXYCODONE 10 MG: 5 TABLET ORAL at 04:55

## 2025-03-28 RX ADMIN — PREGABALIN 100 MG: 100 CAPSULE ORAL at 13:37

## 2025-03-28 RX ADMIN — MIRABEGRON 50 MG: 25 TABLET, FILM COATED, EXTENDED RELEASE ORAL at 08:19

## 2025-03-28 RX ADMIN — HYDROXYZINE HYDROCHLORIDE 25 MG: 25 TABLET ORAL at 21:24

## 2025-03-28 RX ADMIN — METHOCARBAMOL TABLETS 750 MG: 750 TABLET, COATED ORAL at 16:52

## 2025-03-28 RX ADMIN — POLYETHYLENE GLYCOL 3350 17 G: 17 POWDER, FOR SOLUTION ORAL at 08:15

## 2025-03-28 RX ADMIN — LAMOTRIGINE 100 MG: 100 TABLET ORAL at 21:25

## 2025-03-28 RX ADMIN — INSULIN ASPART 1 UNITS: 100 INJECTION, SOLUTION INTRAVENOUS; SUBCUTANEOUS at 08:50

## 2025-03-28 RX ADMIN — OXYCODONE 10 MG: 5 TABLET ORAL at 21:25

## 2025-03-28 RX ADMIN — PREGABALIN 100 MG: 100 CAPSULE ORAL at 08:20

## 2025-03-28 RX ADMIN — BISACODYL 10 MG: 10 SUPPOSITORY RECTAL at 13:37

## 2025-03-28 RX ADMIN — ENOXAPARIN SODIUM 40 MG: 40 INJECTION SUBCUTANEOUS at 13:37

## 2025-03-28 RX ADMIN — TOLTERODINE TARTRATE 2 MG: 1 TABLET ORAL at 21:25

## 2025-03-28 RX ADMIN — FERROUS SULFATE TAB 325 MG (65 MG ELEMENTAL FE) 325 MG: 325 (65 FE) TAB at 08:20

## 2025-03-28 RX ADMIN — LAMOTRIGINE 100 MG: 100 TABLET ORAL at 08:20

## 2025-03-28 RX ADMIN — TRIAMTERENE AND HYDROCHLOROTHIAZIDE 1 CAPSULE: 37.5; 25 CAPSULE ORAL at 08:20

## 2025-03-28 RX ADMIN — FERROUS SULFATE TAB 325 MG (65 MG ELEMENTAL FE) 325 MG: 325 (65 FE) TAB at 21:25

## 2025-03-28 RX ADMIN — OXYCODONE HYDROCHLORIDE 5 MG: 5 TABLET ORAL at 08:55

## 2025-03-28 RX ADMIN — ROSUVASTATIN CALCIUM 10 MG: 10 TABLET, FILM COATED ORAL at 08:19

## 2025-03-28 RX ADMIN — HYDROXYZINE HYDROCHLORIDE 10 MG: 10 TABLET ORAL at 09:44

## 2025-03-28 RX ADMIN — PANTOPRAZOLE SODIUM 40 MG: 40 TABLET, DELAYED RELEASE ORAL at 08:19

## 2025-03-28 RX ADMIN — SENNOSIDES 8.6 MG: 8.6 TABLET, FILM COATED ORAL at 08:19

## 2025-03-28 RX ADMIN — HYDROXYZINE HYDROCHLORIDE 25 MG: 25 TABLET ORAL at 08:18

## 2025-03-28 RX ADMIN — SENNOSIDES 8.6 MG: 8.6 TABLET, FILM COATED ORAL at 21:25

## 2025-03-28 RX ADMIN — TOLTERODINE TARTRATE 2 MG: 1 TABLET ORAL at 08:19

## 2025-03-28 RX ADMIN — PREDNISONE 6 MG: 1 TABLET ORAL at 08:19

## 2025-03-28 RX ADMIN — DULOXETINE 60 MG: 30 CAPSULE, DELAYED RELEASE ORAL at 08:20

## 2025-03-28 RX ADMIN — BUSPIRONE HYDROCHLORIDE 7.5 MG: 7.5 TABLET ORAL at 21:25

## 2025-03-28 RX ADMIN — BUSPIRONE HYDROCHLORIDE 7.5 MG: 7.5 TABLET ORAL at 08:20

## 2025-03-28 RX ADMIN — INSULIN ASPART 3 UNITS: 100 INJECTION, SOLUTION INTRAVENOUS; SUBCUTANEOUS at 16:53

## 2025-03-28 ASSESSMENT — ACTIVITIES OF DAILY LIVING (ADL)
ADLS_ACUITY_SCORE: 71
ADLS_ACUITY_SCORE: 71
ADLS_ACUITY_SCORE: 72
ADLS_ACUITY_SCORE: 67
ADLS_ACUITY_SCORE: 72
ADLS_ACUITY_SCORE: 67
ADLS_ACUITY_SCORE: 65
ADLS_ACUITY_SCORE: 72
ADLS_ACUITY_SCORE: 67
ADLS_ACUITY_SCORE: 71
ADLS_ACUITY_SCORE: 65
ADLS_ACUITY_SCORE: 67
ADLS_ACUITY_SCORE: 70
ADLS_ACUITY_SCORE: 70
ADLS_ACUITY_SCORE: 71
ADLS_ACUITY_SCORE: 72
ADLS_ACUITY_SCORE: 70
ADLS_ACUITY_SCORE: 72
ADLS_ACUITY_SCORE: 66
ADLS_ACUITY_SCORE: 67
ADLS_ACUITY_SCORE: 71

## 2025-03-28 NOTE — PLAN OF CARE
Problem: Adult Inpatient Plan of Care  Goal: Plan of Care Review  Description: The Plan of Care Review/Shift note should be completed every shift.  The Outcome Evaluation is a brief statement about your assessment that the patient is improving, declining, or no change.  This information will be displayed automatically on your shiftnote.  Outcome: Progressing     Problem: Adult Inpatient Plan of Care  Goal: Optimal Comfort and Wellbeing  Outcome: Progressing   Goal Outcome Evaluation:    Pt alert, disoriented to time. VSS on RA. PRN oxycodone for pain management. Purewick in place. Bed in low, alarm on for safety, pt able to make needs known.

## 2025-03-28 NOTE — PROGRESS NOTES
Care Management Follow Up    Length of Stay (days): 3    Expected Discharge Date: 03/29/2025     Concerns to be Addressed: discharge planning  .    Patient plan of care discussed at interdisciplinary rounds: Yes    Anticipated Discharge Disposition:  [facility]     Anticipated Discharge Services: Transportation Services    Anticipated Discharge DME: None    Patient/family educated on Medicare website which has current facility and service quality ratings: yes    Education Provided on the Discharge Plan: Yes (AVS per bedside RN)    Patient/Family in Agreement with the Plan: yes    Referrals Placed by CM/SW: Post Acute Facilities    Private pay costs discussed: transportation costs    Discussed  Partnership in Safe Discharge Planning  document with patient/family: Yes: patient          Additional Information:  CM reviewed chart. [facility] has accepted for today.   CM updated [facility] admissions of the ride time. 8:59 AM     CM updated [facility]. Unable to leave voicemail. 9:25 AM     CM sent message to [facility]. Patient is not ready for discharge today. 9:30 AM     CM arranged transportation and now cancelled the transportation.     CM met with Davie (son). He is agreeable to [facility]. Davie said she was at another [facility] and didn't have a good experience. Davie said he is agreeable to [facility] and will talk to patient about going. Davie is agreeable to CM arranging transportation. Davie is aware that patient may be billed for transportation and is agreeable.       [facility]  can accept on the weekend. CM sent [facility]  admissions a message that plan on discharge tomorrow. 1:09 PM     CM arranged for [facility] wheelchair transportation to [facility] at discharge.  time on 3/29/25 is  between 12:40 pm - 1:20 pm.       CM left VM for Davie with ride time and requested a return call to . 1:31 PM     CM received a VM from Luna Mcnulty Care Coordinator. The phone number is a non-working [employer] number. 1:42 PM       CM called [facility]. The correct phone number is [phone]. CM left VM update for Tawnya. 2:05 PM         Next Steps: confirm with MD that patient is ok to discharge 3/29/25, coordinate with [facility] at discharge.          CM completed PAS - [id number]        [facility]  Phone [phone]  CM updated home care that patient will discharge to TCU.                  Daniella Mcallister RN  Care Coordinator

## 2025-03-28 NOTE — PLAN OF CARE
Goal Outcome Evaluation: Pt A&O X 3, disorientated to time, [place]. VSS on RA. Reports back pain, managed with PRN oxycodone, refused Robaxin PRN when offered. Refused scheduled suppository, wanted scheduled stool softeners instead. Purewick intact, and draining urine effectively. Tolerated oral diet well. Alarms on for safety. Call light within reach. Discharge pending.     Problem: Adult Inpatient Plan of Care  Goal: Absence of Hospital-Acquired Illness or Injury  Intervention: Identify and Manage Fall Risk  Recent Flowsheet Documentation  Taken 3/27/2025 1600 by Kaity Durham RN  Safety Promotion/Fall Prevention:   safety round/check completed   room organization consistent   mobility aid in reach   nonskid shoes/slippers when out of bed   clutter free environment maintained   activity supervised   room near nurse's station  Intervention: Prevent Skin Injury  Recent Flowsheet Documentation  Taken 3/27/2025 1600 by Kaity Durham RN  Body Position: weight shifting  Intervention: Prevent and Manage VTE (Venous Thromboembolism) Risk  Recent Flowsheet Documentation  Taken 3/27/2025 1600 by Kaity Durham RN  VTE Prevention/Management: compression stockings on  Intervention: Prevent Infection  Recent Flowsheet Documentation  Taken 3/27/2025 1600 by Kaity Durham RN  Infection Prevention:   rest/sleep promoted   single patient room provided

## 2025-03-28 NOTE — PLAN OF CARE
Problem: Adult Inpatient Plan of Care  Goal: Readiness for Transition of Care  Outcome: Progressing     Problem: Adult Inpatient Plan of Care  Goal: Optimal Comfort and Wellbeing  Outcome: Progressing   Goal Outcome Evaluation:    Pt is disoriented to time. VSS on RA. Pain reported in back, managed with PRN Oxycodone. Tolerating a regular diet. [place]. Purewick in place. Alarms on for safety.

## 2025-03-28 NOTE — PROGRESS NOTES
"[facility]    Medicine Progress Note - Hospitalist Service    Date of Admission:  3/24/2025    Assessment & Plan   Jumana Yang is a 84 year old female admitted with lower extremity pain and weakness in the setting of lumbar spinal canal stenosis.  No surgical intervention planned this hospital stay.  She is clinically stable and medically ready for discharge.    Patient has constipation.  She did have a small bowel movement documented yesterday.  However, XR was performed after this.  Abdomen benign.  Patient should have a bowel movement prior to discharge.    # Lumbar spinal canal stenosis  # Lower extremity weakness  - outpatient neurosurgery followup  - PT/OT  - tylenol, oxycodone, methocarbamol prn  - pregabalin    # Constipation  - bowel regimen  - repeat suppository    # Coccyx wound  - WOC consult  - outpatient wound care followup    # Diabetes  - ISS    # HTN    # COPD    # Meniere's disease  - chronic prednisone  - triamterene/hydrochlorothiazide    # Depression  # Anxiety  # Bipolar disorder  - buspirone  - duloxetine  - hydroxyzine  - lamotrigine            Diet: Consistent Carbohydrate Diet High Consistent Carb (75 g CHO per Meal) Diet      Teran Catheter: Not present  Lines: None     Cardiac Monitoring: None  Code Status: No CPR- Do NOT Intubate      Clinically Significant Risk Factors                   # Hypertension: Noted on problem list            # Obesity: Estimated body mass index is 35.03 kg/m  as calculated from the following:    Height as of this encounter: 1.651 m (5' 5\").    Weight as of this encounter: 95.5 kg (210 lb 8 oz)., PRESENT ON ADMISSION     # Financial/Environmental Concerns: none         Social Drivers of Health    Tobacco Use: Medium Risk (3/24/2025)    Patient History     Smoking Tobacco Use: Former     Smokeless Tobacco Use: Former          Disposition Plan     Medically Ready for Discharge: Anticipated Today             Kristian Stearns " MD  Hospitalist Service  [facility]  Securely message with [employer] (more info)  Text page via [employer] Paging/Directory   ______________________________________________________________________    Interval History   Reports leg pain with moving only.  Endorses flatus.  Hasn't had a bowel movement since a small one yesterday morning.    Physical Exam   Vital Signs: Temp: 97.3  F (36.3  C) Temp src: Oral BP: 132/61 Pulse: 80   Resp: 18 SpO2: 92 % O2 Device: None (Room air)    Weight: 210 lbs 8 oz    Gen:  lying in bed, severely anxious and tearful  Neuro:  alert, conversant  CV:  nl rate, regular rhythm  Pulm:  no acute resp distress, ctab anteriorly  GI:  bowel sounds present, abdomen NTTP    Medical Decision Making             Data

## 2025-03-29 ENCOUNTER — APPOINTMENT (OUTPATIENT)
Dept: SPEECH THERAPY | Facility: CLINIC | Age: 85
DRG: 552 | End: 2025-03-29
Attending: HOSPITALIST
Payer: MEDICARE

## 2025-03-29 LAB
GLUCOSE BLDC GLUCOMTR-MCNC: 185 MG/DL (ref 70–99)
GLUCOSE BLDC GLUCOMTR-MCNC: 210 MG/DL (ref 70–99)
GLUCOSE BLDC GLUCOMTR-MCNC: 212 MG/DL (ref 70–99)
GLUCOSE BLDC GLUCOMTR-MCNC: 236 MG/DL (ref 70–99)
PLATELET # BLD AUTO: 200 10E3/UL (ref 150–450)

## 2025-03-29 PROCEDURE — 99232 SBSQ HOSP IP/OBS MODERATE 35: CPT | Performed by: HOSPITALIST

## 2025-03-29 PROCEDURE — 92610 EVALUATE SWALLOWING FUNCTION: CPT | Mod: GN

## 2025-03-29 PROCEDURE — 250N000012 HC RX MED GY IP 250 OP 636 PS 637: Performed by: HOSPITALIST

## 2025-03-29 PROCEDURE — 92526 ORAL FUNCTION THERAPY: CPT | Mod: GN

## 2025-03-29 PROCEDURE — 120N000001 HC R&B MED SURG/OB

## 2025-03-29 PROCEDURE — 85049 AUTOMATED PLATELET COUNT: CPT | Performed by: HOSPITALIST

## 2025-03-29 PROCEDURE — 36415 COLL VENOUS BLD VENIPUNCTURE: CPT | Performed by: HOSPITALIST

## 2025-03-29 PROCEDURE — 250N000013 HC RX MED GY IP 250 OP 250 PS 637: Performed by: HOSPITALIST

## 2025-03-29 PROCEDURE — 250N000011 HC RX IP 250 OP 636: Performed by: HOSPITALIST

## 2025-03-29 RX ORDER — BISACODYL 10 MG
10 SUPPOSITORY, RECTAL RECTAL ONCE
Status: COMPLETED | OUTPATIENT
Start: 2025-03-29 | End: 2025-03-29

## 2025-03-29 RX ORDER — NALOXONE HYDROCHLORIDE 0.4 MG/ML
0.2 INJECTION, SOLUTION INTRAMUSCULAR; INTRAVENOUS; SUBCUTANEOUS
Status: DISCONTINUED | OUTPATIENT
Start: 2025-03-29 | End: 2025-04-01 | Stop reason: HOSPADM

## 2025-03-29 RX ORDER — DOCUSATE SODIUM 100 MG/1
100 CAPSULE, LIQUID FILLED ORAL ONCE
Status: COMPLETED | OUTPATIENT
Start: 2025-03-29 | End: 2025-03-30

## 2025-03-29 RX ORDER — POLYETHYLENE GLYCOL 3350 17 G/17G
238 POWDER, FOR SOLUTION ORAL ONCE
Status: COMPLETED | OUTPATIENT
Start: 2025-03-29 | End: 2025-03-29

## 2025-03-29 RX ORDER — DIPHENHYDRAMINE HCL 25 MG
25 CAPSULE ORAL EVERY 6 HOURS PRN
Status: DISCONTINUED | OUTPATIENT
Start: 2025-03-29 | End: 2025-04-01 | Stop reason: HOSPADM

## 2025-03-29 RX ORDER — NALOXONE HYDROCHLORIDE 0.4 MG/ML
0.4 INJECTION, SOLUTION INTRAMUSCULAR; INTRAVENOUS; SUBCUTANEOUS
Status: DISCONTINUED | OUTPATIENT
Start: 2025-03-29 | End: 2025-04-01 | Stop reason: HOSPADM

## 2025-03-29 RX ORDER — ACETAMINOPHEN 500 MG
1000 TABLET ORAL EVERY 6 HOURS PRN
Status: SHIPPED
Start: 2025-03-29

## 2025-03-29 RX ADMIN — DULOXETINE 60 MG: 30 CAPSULE, DELAYED RELEASE ORAL at 08:56

## 2025-03-29 RX ADMIN — TRIAMTERENE AND HYDROCHLOROTHIAZIDE 1 CAPSULE: 37.5; 25 CAPSULE ORAL at 08:59

## 2025-03-29 RX ADMIN — ENOXAPARIN SODIUM 40 MG: 40 INJECTION SUBCUTANEOUS at 13:45

## 2025-03-29 RX ADMIN — INSULIN ASPART 1 UNITS: 100 INJECTION, SOLUTION INTRAVENOUS; SUBCUTANEOUS at 09:03

## 2025-03-29 RX ADMIN — DIAZEPAM 2 MG: 2 TABLET ORAL at 12:46

## 2025-03-29 RX ADMIN — OXYCODONE 10 MG: 5 TABLET ORAL at 18:06

## 2025-03-29 RX ADMIN — OXYCODONE 10 MG: 5 TABLET ORAL at 04:14

## 2025-03-29 RX ADMIN — Medication: at 14:00

## 2025-03-29 RX ADMIN — TOLTERODINE TARTRATE 2 MG: 1 TABLET ORAL at 19:59

## 2025-03-29 RX ADMIN — HYDROXYZINE HYDROCHLORIDE 25 MG: 25 TABLET ORAL at 19:58

## 2025-03-29 RX ADMIN — PREGABALIN 100 MG: 100 CAPSULE ORAL at 08:56

## 2025-03-29 RX ADMIN — METHOCARBAMOL TABLETS 750 MG: 750 TABLET, COATED ORAL at 11:27

## 2025-03-29 RX ADMIN — INSULIN ASPART 2 UNITS: 100 INJECTION, SOLUTION INTRAVENOUS; SUBCUTANEOUS at 13:44

## 2025-03-29 RX ADMIN — SENNOSIDES 8.6 MG: 8.6 TABLET, FILM COATED ORAL at 19:58

## 2025-03-29 RX ADMIN — INSULIN ASPART 2 UNITS: 100 INJECTION, SOLUTION INTRAVENOUS; SUBCUTANEOUS at 18:07

## 2025-03-29 RX ADMIN — LAMOTRIGINE 100 MG: 100 TABLET ORAL at 19:59

## 2025-03-29 RX ADMIN — ROSUVASTATIN CALCIUM 10 MG: 10 TABLET, FILM COATED ORAL at 08:56

## 2025-03-29 RX ADMIN — LAMOTRIGINE 100 MG: 100 TABLET ORAL at 08:59

## 2025-03-29 RX ADMIN — BUSPIRONE HYDROCHLORIDE 7.5 MG: 7.5 TABLET ORAL at 19:59

## 2025-03-29 RX ADMIN — SENNOSIDES 8.6 MG: 8.6 TABLET, FILM COATED ORAL at 08:57

## 2025-03-29 RX ADMIN — POLYETHYLENE GLYCOL 3350 238 G: 17 POWDER, FOR SOLUTION ORAL at 10:10

## 2025-03-29 RX ADMIN — BUSPIRONE HYDROCHLORIDE 7.5 MG: 7.5 TABLET ORAL at 08:59

## 2025-03-29 RX ADMIN — TOLTERODINE TARTRATE 2 MG: 1 TABLET ORAL at 08:59

## 2025-03-29 RX ADMIN — FERROUS SULFATE TAB 325 MG (65 MG ELEMENTAL FE) 325 MG: 325 (65 FE) TAB at 08:57

## 2025-03-29 RX ADMIN — OXYCODONE 10 MG: 5 TABLET ORAL at 12:46

## 2025-03-29 RX ADMIN — OXYCODONE 10 MG: 5 TABLET ORAL at 08:56

## 2025-03-29 RX ADMIN — BISACODYL 10 MG: 10 SUPPOSITORY RECTAL at 10:10

## 2025-03-29 RX ADMIN — MIRABEGRON 50 MG: 25 TABLET, FILM COATED, EXTENDED RELEASE ORAL at 08:59

## 2025-03-29 RX ADMIN — PREGABALIN 100 MG: 100 CAPSULE ORAL at 16:04

## 2025-03-29 RX ADMIN — HYDROXYZINE HYDROCHLORIDE 25 MG: 25 TABLET ORAL at 08:56

## 2025-03-29 RX ADMIN — FERROUS SULFATE TAB 325 MG (65 MG ELEMENTAL FE) 325 MG: 325 (65 FE) TAB at 19:58

## 2025-03-29 RX ADMIN — PREDNISONE 6 MG: 1 TABLET ORAL at 08:59

## 2025-03-29 RX ADMIN — METHOCARBAMOL TABLETS 750 MG: 750 TABLET, COATED ORAL at 21:48

## 2025-03-29 RX ADMIN — PANTOPRAZOLE SODIUM 40 MG: 40 TABLET, DELAYED RELEASE ORAL at 08:56

## 2025-03-29 RX ADMIN — DIPHENHYDRAMINE HYDROCHLORIDE 25 MG: 25 CAPSULE ORAL at 16:04

## 2025-03-29 RX ADMIN — PREGABALIN 100 MG: 100 CAPSULE ORAL at 19:58

## 2025-03-29 ASSESSMENT — ACTIVITIES OF DAILY LIVING (ADL)
ADLS_ACUITY_SCORE: 67
ADLS_ACUITY_SCORE: 66
ADLS_ACUITY_SCORE: 65
ADLS_ACUITY_SCORE: 66
ADLS_ACUITY_SCORE: 66
ADLS_ACUITY_SCORE: 67
ADLS_ACUITY_SCORE: 65
ADLS_ACUITY_SCORE: 66
ADLS_ACUITY_SCORE: 66
ADLS_ACUITY_SCORE: 65
ADLS_ACUITY_SCORE: 67
ADLS_ACUITY_SCORE: 66
ADLS_ACUITY_SCORE: 67
ADLS_ACUITY_SCORE: 67
ADLS_ACUITY_SCORE: 65
ADLS_ACUITY_SCORE: 66
ADLS_ACUITY_SCORE: 65
ADLS_ACUITY_SCORE: 66
ADLS_ACUITY_SCORE: 67

## 2025-03-29 NOTE — PROGRESS NOTES
"Speech-Language Pathology: Clinical Swallow Evaluation     03/29/25 1500   Appointment Info   Signing Clinician's Name / Credentials (SLP) Eleanor Foster MS, CCC-SLP   General Information   Onset of Illness/Injury or Date of Surgery 03/24/25   Referring Physician Kristian Stearns MD   Pertinent History of Current Problem Per EMR, \"84 year old female admitted with lower extremity pain and weakness in the setting of lumbar spinal canal stenosis.  No surgical intervention planned this hospital stay.  She is clinically stable.  Has not had a bowel movement (prior documentation in error), which is a barrier to discharge to TCU.  Lack of tenderness on exam or vomiting is reassuring.  Escalate bowel regimen.\" Clinical swallow evaluation completed per MD orders as pt endorsing difficulty swallowing, which has been ongoing and being addressed by PCP and voice changes.   General Observations Alert and cooperative. Pt severely [place], succesful with pocket talker when talking very slow and repeating self as needed. Pt's son present.   Type of Evaluation   Type of Evaluation Swallow Evaluation   Oral Motor   Oral Musculature generally intact   Structural Abnormalities none present   Mucosal Quality good   Dentition (Oral Motor)   Dentition (Oral Motor) edentulous;dental appliance/dentures   Dental Appliance/Denture (Oral Motor) upper;dentures, full   Facial Symmetry (Oral Motor)   Facial Symmetry (Oral Motor) WNL   Lip Function (Oral Motor)   Lip Range of Motion (Oral Motor) WNL   Lip Strength (Oral Motor) WNL   Lip Coordination (Oral Motor) bilateral   Tongue Function (Oral Motor)   Tongue Strength (Oral Motor) WNL   Tongue Coordination/Speed (Oral Motor) WNL   Tongue ROM (Oral Motor) lateralization is impaired   Lateralization, Tongue ROM Impairment (Oral Motor) bilateral;minimal impairment   Jaw Function (Oral Motor)   Jaw Function (Oral Motor) WNL   Cough/Swallow/Gag Reflex (Oral Motor)   Volitional Throat Clear/Cough (Oral " "Motor) reduced strength   Vocal Quality/Secretion Management (Oral Motor)   Vocal Quality (Oral Motor) WFL;hoarse  (mildly, which pt endorses is baseline for her)   Secretion Management (Oral Motor) WNL   General Swallowing Observations   Past History of Dysphagia Per EMR review, CSE 3/8/2022 recommending regular diet and thin liquids with occassional belching thorughout PO trials. VFSS 3/9/2022 with transient shallow penetration of thin liquids, no aspiration, penetration, or pharyngeal residuals with thin or puree. Recommended regular diet and thin liquids. Per VFSS report, at that time Chest CT noted \"chronic patulous dilation of esophagus\".   Comment, General Swallowing Observations RN endorsed good tolerance of reg/thin diet with no overt s/s of aspiration.   Respiratory Support room air   Current Diet/Method of Nutritional Intake (General Swallowing Observations, NIS) regular diet;thin liquids (level 0)   Swallowing Evaluation Clinical swallow evaluation   Clinical Swallow Evaluation   Feeding Assistance set up only required   Additional evaluation(s) completed today No   Clinical Swallow Evaluation Textures Trialed thin liquids;pureed;solid foods   Clinical Swallow Eval: Thin Liquid Texture Trial   Mode of Presentation, Thin Liquids straw;self-fed   Volume of Liquid or Food Presented 8 oz   Oral Phase of Swallow WFL   Pharyngeal Phase of Swallow intact   Diagnostic Statement No overt s/s of aspiration. x3 belching throughout thin liquid trials   Clinical Swallow Evaluation: Puree Solid Texture Trial   Mode of Presentation, Puree spoon;self-fed   Volume of Puree Presented 2 oz   Oral Phase, Puree WFL   Pharyngeal Phase, Puree intact   Diagnostic Statement No overt s/s of aspiration or dysphagia. Pt denies globus sensation.   Clinical Swallow Evaluation: Solid Food Texture Trial   Mode of Presentation self-fed   Volume Presented 2 crackers   Oral Phase other (see comments);residue in oral cavity  (prolonged " "mastication)   Oral Residue mid posterior tongue  (mild)   Pharyngeal Phase intact   Successful Strategies Trialed During Procedure other (see comments)  (liquid wash)   Diagnostic Statement No overt s/s of aspiration or dysphagia. Pt denies globus sensation. Prolonged, but functional, and complete mastication. Mild oral residue, which fully cleared with cued liquid wash.   Esophageal Phase of Swallow   Patient reports or presents with symptoms of esophageal dysphagia Yes   Esophageal comments Hx of GERD. Pt endorses frequent sensation of reflux, food \"coming back up\", phlegm, and belching with intake. Pt has been addressing with PCP prior to admit. Per pt's son, pt has an EGD scheduled, via PCP, but after looking at PCP notes via EMR, looks like ENT referral has been placed for hoarseness. Question LPR with pt's reports of severe reflux. Recommend consideration for further GI referral as well as ENT.   Swallowing Recommendations   Diet Consistency Recommendations thin liquids (level 0);regular diet   Supervision Level for Intake distant supervision needed   Mode of Delivery Recommendations bolus size, small;slow rate of intake   Postural Recommendations none   Swallowing Maneuver Recommendations alternate food and liquid intake   Monitoring/Assistance Required (Eating/Swallowing) check mouth frequently for oral residue/pocketing;cue for finger/lingual sweep if oral pocketing present;stop eating activities when fatigue is present;monitor for cough or change in vocal quality with intake   Recommended Feeding/Eating Techniques (Swallow Eval) maintain upright posture during/after eating for 30 minutes;minimize distractions during oral intake;provide 6 smaller meals throughout day;set-up and prepare tray   Medication Administration Recommendations, Swallowing (SLP) as tolerated   Instrumental Assessment Recommendations instrumental evaluation not recommended at this time   Clinical Impression   Criteria for Skilled " Therapeutic Interventions Met (SLP Eval) Evaluation only  (& one time treat)   SLP Diagnosis Functional oropharyngeal swallowing mechanism   Risks & Benefits of therapy have been explained evaluation/treatment results reviewed;care plan/treatment goals reviewed;risks/benefits reviewed;current/potential barriers reviewed;participants included;patient;son;participants voiced agreement with care plan   Clinical Impression Comments Clinical Swallow Evaluation completed per MD orders. Oral motor function was WFL, notable for pt being fully edentulous, only having upper dentures. Patient's voice was hoarse, which pt has expressed in baseline and an ongoing concern that she has been discussion with her PCP. Patient assessed with thin liquids, puree, and regular solids. Patient had no overt s/s aspiration across any texture trialed. Mastication was prolonged, but functional, and complete. Mild oral residue noted, but fully cleared with cued liquid wash. Pt denied globus sensation. Note frequent belching during evaluation. Recommend pt continues regular diet and thin liquids with safe swallowing & GERD/reflux precautions. Patient should be sitting fully upright and alert for the duration of the meal & 30+ minutes after, take small bites/sips at a slow rate, and alternate solids/liquids. Meds as tolerated. No further ST is warranted at this time. Contact SLP if any questions or concerns. Per pt's son, pt has an EGD scheduled, via PCP, but after looking at PCP notes via EMR, looks like ENT referral has been placed for hoarseness. Question LPR with pt's reports of severe reflux. Recommend consideration for further GI referral as well as ENT.   SLP Total Evaluation Time   Eval: oral/pharyngeal swallow function, clinical swallow Minutes (29036) 15   SLP Goals   Therapy Frequency (SLP Eval) one time eval and treatment only   SLP Predicted Duration/Target Date for Goal Attainment 03/29/25   SLP Goals Swallow   SLP: Safely tolerate  diet without signs/symptoms of aspiration Goal Met;Regular diet;Thin liquids;With use of swallow precautions;Independently   Interventions   Interventions Quick Adds Swallowing Dysfunction   Swallowing Intervention   Treatment of Swallowing Dysfunction &/or Oral Function for Feeding Minutes (57071) 12   Symptoms Noted During/After Treatment None   Treatment Detail/Skilled Intervention Trained pt & pt's son re: rationale for evaluation, anatomy & physiology of swallowing (including oropharyngeal vs. esophageal), overt s/s of aspiration, possible aspiration-related complications, diet recommendations & rationale, safe swallowing strategies, and GERD/reflux precautions. Pt & pt's son voiced understanding and agreement. Additionally trained pt & pt's son on recommendations for OP ENT d/t patient's concern for hoarseness, question LPR with pt's reports of severe reflux, as well as ongoing consideration for further OP GI referral.   SLP Discharge Planning   SLP Plan s/o   SLP Discharge Recommendation other (see comments)   SLP Rationale for DC Rec No further ST warrented. Pt at baseline swallowing fx.   SLP Brief overview of current status  Recommend pt continues regular diet and thin liquids with safe swallowing & GERD/reflux precautions. Patient should be sitting fully upright and alert for the duration of the meal & 30+ minutes after, take small bites/sips at a slow rate, and alternate solids/liquids. Meds as tolerated. No further ST is warranted at this time. Contact SLP if any questions or concerns. Patient may benefit from recommendations for OP ENT d/t patient's concern for hoarseness, question LPR with pt's reports of severe reflux, as well as ongoing consideration for further OP GI referral. Appears to be beginning to be managed by pt's PCP.   SLP Time and Intention   Total Session Time (sum of timed and untimed services) 27

## 2025-03-29 NOTE — PLAN OF CARE
Goal Outcome Evaluation:      Plan of Care Reviewed With: patient    Overall Patient Progress: improvingOverall Patient Progress: improving    Alert and oriented x4, able to make needs known, denied pain at rest, but with activities pain was severe, gave PRN oxy for good relief, repositioned as tolerable, very [place], writer utilized white board for communication, Mepilex in place, has constipation, but denied fullness or discomfort, reported passing flatus, CMS intact per patient's baseline.

## 2025-03-29 NOTE — PROGRESS NOTES
Care Management Follow Up    Length of Stay (days): 4    Expected Discharge Date: 03/30/2025     Concerns to be Addressed: discharge planning     Patient plan of care discussed at interdisciplinary rounds: Yes    Anticipated Discharge Disposition:  ([facility])         Anticipated Discharge Services: Transportation Services  Anticipated Discharge DME: None    Patient/family educated on Medicare website which has current facility and service quality ratings: yes  Education Provided on the Discharge Plan: Yes (AVS per bedside RN)  Patient/Family in Agreement with the Plan: yes    Referrals Placed by CM/SW: Post Acute Facilities  Private pay costs discussed: Not applicable    Discussed  Partnership in Safe Discharge Planning  document with patient/family: No     Handoff Completed: No, handoff not indicated or clinically appropriate    Additional Information:  9:03 AM  RAJ informed pt is not medically ready to discharge to TCU today.  RAJ updated [facility] and rescheduled WC ride for tomorrow, 3/30/25 between 12:40-1:20 pm.  RAJ updated yan Broderick.        Marce Bonilla, Cary Medical CenterSW

## 2025-03-29 NOTE — PROGRESS NOTES
"[facility]    Medicine Progress Note - Hospitalist Service    Date of Admission:  3/24/2025    Assessment & Plan   Jumana Yang is a 84 year old female admitted with lower extremity pain and weakness in the setting of lumbar spinal canal stenosis.  No surgical intervention planned this hospital stay.  She is clinically stable.  Has not had a bowel movement (prior documentation in error), which is a barrier to discharge to TCU.  Lack of tenderness on exam or vomiting is reassuring.  Escalate bowel regimen.      # Lumbar spinal canal stenosis  # Lower extremity weakness  - outpatient neurosurgery followup  - PT/OT  - tylenol, oxycodone, methocarbamol prn  - pregabalin    # Constipation  - suppository, followed by enema if needed  - miralax bowel prep  - senna bid    # Report of dysphagia, hoarseness  - PCP evaluating hoarseness  - speech evaluation    # Coccyx wound  - WOC consult  - outpatient wound care followup    # Diabetes  - ISS    # HTN    # COPD    # Lymphedema  - compression stockings    # Meniere's disease  - chronic prednisone  - triamterene/hydrochlorothiazide    # Depression  # Anxiety  # Bipolar disorder  - buspirone  - duloxetine  - hydroxyzine  - lamotrigine            Diet: Consistent Carbohydrate Diet High Consistent Carb (75 g CHO per Meal) Diet      Teran Catheter: Not present  Lines: None     Cardiac Monitoring: None  Code Status: No CPR- Do NOT Intubate      Clinically Significant Risk Factors                   # Hypertension: Noted on problem list            # Obesity: Estimated body mass index is 35.03 kg/m  as calculated from the following:    Height as of this encounter: 1.651 m (5' 5\").    Weight as of this encounter: 95.5 kg (210 lb 8 oz)., PRESENT ON ADMISSION     # Financial/Environmental Concerns: none         Social Drivers of Health    Tobacco Use: Medium Risk (3/24/2025)    Patient History     Smoking Tobacco Use: Former     Smokeless Tobacco Use: Former    "       Disposition Plan     Medically Ready for Discharge: Anticipated Tomorrow             Kristian Stearns MD  Hospitalist Service  [facility]  Securely message with [employer] (more info)  Text page via [employer] Paging/Directory   ______________________________________________________________________    Interval History   Denies having a bowel movement or flatus.  Reports right sided abdominal pain and distension.  Denies vomiting.    Physical Exam   Vital Signs: Temp: 98.1  F (36.7  C) Temp src: Oral BP: 114/57 Pulse: 75   Resp: 18 SpO2: 100 % O2 Device: None (Room air) Oxygen Delivery: 1 LPM  Weight: 210 lbs 8 oz    Gen:  lying in bed in no extremis  Neuro:  alert, conversant; less anxious today compared to yesterday  CV:  nl rate, regular rhythm  Pulm:  no acute resp distress, ctab anteriorly  GI:  active bowel sounds, abdomen mildly distended, NTTP    Medical Decision Making             Data

## 2025-03-29 NOTE — PLAN OF CARE
Problem: Adult Inpatient Plan of Care  Goal: Absence of Hospital-Acquired Illness or Injury  Intervention: Prevent Skin Injury  Recent Flowsheet Documentation  Taken 3/28/2025 2133 by Suni Rodríguez RN  Body Position: weight shifting      Problem: Skin Injury Risk Increased  Goal: Skin Health and Integrity  Intervention: Plan: Nurse Driven Intervention: Moisture Management  Recent Flowsheet Documentation  Taken 3/28/2025 2133 by Suni Rodríguez, RN  Moisture Interventions:   No brief in bed   Urinary collection device     Alert, disoriented to time. Very [place], reads lips. Calm and cooperative this shift. Purewick in place. Refusing Miralax this shift because she wanted to sleep. Repositioning done as tolerated. Oxycodone 10mg given for pain.

## 2025-03-29 NOTE — PLAN OF CARE
A&O. Very [place], pocket talker used. Very anxious. PRN meds given. CCHO diet with BG checks. Up A1-2. Weight shifting encouraged. C/o back pain to left leg pain, PRN Oxycodone, Robaxin given. LS diminished. BS active. Purewick in place. C/o constipation, PRN Miralax and suppository given. Enema given @1900. No IV access. Speech eval for swallowing/cough after swallowing. Shower this afternoon. Discharge pending to TCU. Continue to monitor       Goal Outcome Evaluation:  Problem: Fall Injury Risk  Goal: Absence of Fall and Fall-Related Injury  Outcome: Progressing     Problem: Skin Injury Risk Increased  Goal: Skin Health and Integrity  Intervention: Optimize Skin Protection  Recent Flowsheet Documentation  Taken 3/29/2025 1600 by Jennifer Child, RN  Activity Management:   activity adjusted per tolerance   activity encouraged  Taken 3/29/2025 0845 by Jennifer Child, RN  Activity Management:   activity adjusted per tolerance   activity encouraged

## 2025-03-30 ENCOUNTER — APPOINTMENT (OUTPATIENT)
Dept: PHYSICAL THERAPY | Facility: CLINIC | Age: 85
DRG: 552 | End: 2025-03-30
Payer: MEDICARE

## 2025-03-30 ENCOUNTER — APPOINTMENT (OUTPATIENT)
Dept: OCCUPATIONAL THERAPY | Facility: CLINIC | Age: 85
DRG: 552 | End: 2025-03-30
Payer: MEDICARE

## 2025-03-30 LAB
GLUCOSE BLDC GLUCOMTR-MCNC: 166 MG/DL (ref 70–99)
GLUCOSE BLDC GLUCOMTR-MCNC: 182 MG/DL (ref 70–99)
GLUCOSE BLDC GLUCOMTR-MCNC: 206 MG/DL (ref 70–99)
GLUCOSE BLDC GLUCOMTR-MCNC: 224 MG/DL (ref 70–99)
GLUCOSE BLDC GLUCOMTR-MCNC: 227 MG/DL (ref 70–99)

## 2025-03-30 PROCEDURE — 97530 THERAPEUTIC ACTIVITIES: CPT | Mod: GP

## 2025-03-30 PROCEDURE — 97535 SELF CARE MNGMENT TRAINING: CPT | Mod: GO

## 2025-03-30 PROCEDURE — 250N000013 HC RX MED GY IP 250 OP 250 PS 637: Performed by: INTERNAL MEDICINE

## 2025-03-30 PROCEDURE — 99232 SBSQ HOSP IP/OBS MODERATE 35: CPT | Performed by: EMERGENCY MEDICINE

## 2025-03-30 PROCEDURE — 250N000012 HC RX MED GY IP 250 OP 636 PS 637: Performed by: HOSPITALIST

## 2025-03-30 PROCEDURE — 250N000013 HC RX MED GY IP 250 OP 250 PS 637: Performed by: EMERGENCY MEDICINE

## 2025-03-30 PROCEDURE — 250N000013 HC RX MED GY IP 250 OP 250 PS 637: Performed by: HOSPITALIST

## 2025-03-30 PROCEDURE — 250N000011 HC RX IP 250 OP 636: Performed by: HOSPITALIST

## 2025-03-30 PROCEDURE — 120N000001 HC R&B MED SURG/OB

## 2025-03-30 RX ORDER — POLYETHYLENE GLYCOL 3350 17 G/17G
119 POWDER, FOR SOLUTION ORAL ONCE
Status: COMPLETED | OUTPATIENT
Start: 2025-03-30 | End: 2025-03-30

## 2025-03-30 RX ADMIN — LAMOTRIGINE 100 MG: 100 TABLET ORAL at 09:29

## 2025-03-30 RX ADMIN — HYDROXYZINE HYDROCHLORIDE 25 MG: 25 TABLET ORAL at 09:28

## 2025-03-30 RX ADMIN — INSULIN ASPART 2 UNITS: 100 INJECTION, SOLUTION INTRAVENOUS; SUBCUTANEOUS at 12:44

## 2025-03-30 RX ADMIN — METHOCARBAMOL TABLETS 750 MG: 750 TABLET, COATED ORAL at 09:49

## 2025-03-30 RX ADMIN — DULOXETINE 60 MG: 30 CAPSULE, DELAYED RELEASE ORAL at 09:29

## 2025-03-30 RX ADMIN — METHOCARBAMOL TABLETS 750 MG: 750 TABLET, COATED ORAL at 21:00

## 2025-03-30 RX ADMIN — LAMOTRIGINE 100 MG: 100 TABLET ORAL at 20:40

## 2025-03-30 RX ADMIN — TRIAMTERENE AND HYDROCHLOROTHIAZIDE 1 CAPSULE: 37.5; 25 CAPSULE ORAL at 09:29

## 2025-03-30 RX ADMIN — DIAZEPAM 2 MG: 2 TABLET ORAL at 13:41

## 2025-03-30 RX ADMIN — ENOXAPARIN SODIUM 40 MG: 40 INJECTION SUBCUTANEOUS at 16:02

## 2025-03-30 RX ADMIN — SENNOSIDES 8.6 MG: 8.6 TABLET, FILM COATED ORAL at 09:29

## 2025-03-30 RX ADMIN — PREDNISONE 6 MG: 1 TABLET ORAL at 09:29

## 2025-03-30 RX ADMIN — INSULIN ASPART 2 UNITS: 100 INJECTION, SOLUTION INTRAVENOUS; SUBCUTANEOUS at 16:54

## 2025-03-30 RX ADMIN — DIAZEPAM 2 MG: 2 TABLET ORAL at 04:24

## 2025-03-30 RX ADMIN — OXYCODONE HYDROCHLORIDE 5 MG: 5 TABLET ORAL at 04:24

## 2025-03-30 RX ADMIN — ROSUVASTATIN CALCIUM 10 MG: 10 TABLET, FILM COATED ORAL at 09:29

## 2025-03-30 RX ADMIN — DIPHENHYDRAMINE HYDROCHLORIDE 25 MG: 25 CAPSULE ORAL at 16:53

## 2025-03-30 RX ADMIN — FERROUS SULFATE TAB 325 MG (65 MG ELEMENTAL FE) 325 MG: 325 (65 FE) TAB at 09:30

## 2025-03-30 RX ADMIN — HYDROXYZINE HYDROCHLORIDE 25 MG: 25 TABLET ORAL at 20:38

## 2025-03-30 RX ADMIN — SENNOSIDES 8.6 MG: 8.6 TABLET, FILM COATED ORAL at 20:38

## 2025-03-30 RX ADMIN — OXYCODONE HYDROCHLORIDE 5 MG: 5 TABLET ORAL at 16:54

## 2025-03-30 RX ADMIN — OXYCODONE HYDROCHLORIDE 5 MG: 5 TABLET ORAL at 00:33

## 2025-03-30 RX ADMIN — TOLTERODINE TARTRATE 2 MG: 1 TABLET ORAL at 20:39

## 2025-03-30 RX ADMIN — TOLTERODINE TARTRATE 2 MG: 1 TABLET ORAL at 09:29

## 2025-03-30 RX ADMIN — INSULIN ASPART 1 UNITS: 100 INJECTION, SOLUTION INTRAVENOUS; SUBCUTANEOUS at 09:28

## 2025-03-30 RX ADMIN — PREGABALIN 100 MG: 100 CAPSULE ORAL at 09:29

## 2025-03-30 RX ADMIN — PANTOPRAZOLE SODIUM 40 MG: 40 TABLET, DELAYED RELEASE ORAL at 09:29

## 2025-03-30 RX ADMIN — MIRABEGRON 50 MG: 25 TABLET, FILM COATED, EXTENDED RELEASE ORAL at 09:29

## 2025-03-30 RX ADMIN — OXYCODONE HYDROCHLORIDE 5 MG: 5 TABLET ORAL at 09:49

## 2025-03-30 RX ADMIN — PREGABALIN 100 MG: 100 CAPSULE ORAL at 20:38

## 2025-03-30 RX ADMIN — FERROUS SULFATE TAB 325 MG (65 MG ELEMENTAL FE) 325 MG: 325 (65 FE) TAB at 20:38

## 2025-03-30 RX ADMIN — BUSPIRONE HYDROCHLORIDE 7.5 MG: 7.5 TABLET ORAL at 09:29

## 2025-03-30 RX ADMIN — PREGABALIN 100 MG: 100 CAPSULE ORAL at 13:41

## 2025-03-30 RX ADMIN — BUSPIRONE HYDROCHLORIDE 7.5 MG: 7.5 TABLET ORAL at 20:39

## 2025-03-30 RX ADMIN — DOCUSATE SODIUM 100 MG: 100 CAPSULE, LIQUID FILLED ORAL at 00:22

## 2025-03-30 RX ADMIN — POLYETHYLENE GLYCOL 3350 119 G: 17 POWDER, FOR SOLUTION ORAL at 09:50

## 2025-03-30 ASSESSMENT — ACTIVITIES OF DAILY LIVING (ADL)
ADLS_ACUITY_SCORE: 66
ADLS_ACUITY_SCORE: 66
ADLS_ACUITY_SCORE: 67
ADLS_ACUITY_SCORE: 65
ADLS_ACUITY_SCORE: 65
ADLS_ACUITY_SCORE: 68
ADLS_ACUITY_SCORE: 66
ADLS_ACUITY_SCORE: 67
ADLS_ACUITY_SCORE: 66
ADLS_ACUITY_SCORE: 69
ADLS_ACUITY_SCORE: 69
ADLS_ACUITY_SCORE: 68
ADLS_ACUITY_SCORE: 65
ADLS_ACUITY_SCORE: 68
ADLS_ACUITY_SCORE: 65
ADLS_ACUITY_SCORE: 66
ADLS_ACUITY_SCORE: 68
ADLS_ACUITY_SCORE: 66
ADLS_ACUITY_SCORE: 67
ADLS_ACUITY_SCORE: 65
ADLS_ACUITY_SCORE: 65
ADLS_ACUITY_SCORE: 69
ADLS_ACUITY_SCORE: 66

## 2025-03-30 NOTE — PROGRESS NOTES
Care Management Follow Up    Length of Stay (days): 5    Expected Discharge Date: 03/31/2025     Concerns to be Addressed: discharge planning     Patient plan of care discussed at interdisciplinary rounds: Yes    Anticipated Discharge Disposition:  ([facility])         Anticipated Discharge Services: Transportation Services  Anticipated Discharge DME: None    Patient/family educated on Medicare website which has current facility and service quality ratings: yes  Education Provided on the Discharge Plan: Yes (AVS per bedside RN)  Patient/Family in Agreement with the Plan: yes    Referrals Placed by CM/SW: Post Acute Facilities  Private pay costs discussed: Not applicable    Discussed  Partnership in Safe Discharge Planning  document with patient/family: No     Handoff Completed: No, handoff not indicated or clinically appropriate    Additional Information:  9:43 AM  RAJ informed pt is not medically ready to discharge to TCU today. RAJ updated [facility] and rescheduled WC ride for tomorrow, 3/31/25 between 12:40-1:20 pm. RAJ updated yan Broderick.       Marce Bonilla, [facility] HospitalSW

## 2025-03-30 NOTE — PROGRESS NOTES
[facility] MEDICINE PROGRESS NOTE      Summary: 84 year old female into [facility] on 3/24/2025 after arriving to the ER for a mechanical fall.      PMHx includes Menieres disease, hearing loss, GERD, CKD, bipolar, anxiety, steroid dependent for menieres,    Diagnostics on arrival to the ER showed    Hospital course is marked by    3/30/2025:  I have not had a BM in 1 week; pt willing to have a miralax   Prep today; change diet to clears;     Problem list:     Constipation:  miralax bowel prep today; expect discharge tomorrow  Lumbar stenosis due to degenerative disease: outpatient follow up though is affecting her ability to ambulate safely , on lyrica  DM2:  continue jardiance, glipizide, metformin  Lymphedema: continue compression stockings  Menieres disease: chronic prednisone, maxzide  Mood disorder:  continue buspar, cymbalta, lamictal  Opiate use due to ongoing lumbar stenosis: causing constipation;  Will hopefully resolve the bowel issue today; needs a better  Daily bowel plan    8.  Dvt prevention:  lovenox   9.  Disposition:  Medically Ready for Discharge: Anticipated in 2-4 Days       Yolanda Alaniz MD  [facility]  [facility]  Phone: #[phone]  Securely message with the Vocera Web Console (learn more here)  Text page via [employer] Paging/Directory     Interval History/Subjective:  I need to poop    Physical Exam/Objective:  Temp:  [97.1  F (36.2  C)-98.1  F (36.7  C)] 97.6  F (36.4  C)  Pulse:  [75-89] 76  Resp:  [16-18] 16  BP: (126-160)/(57-66) 131/59  SpO2:  [92 %-93 %] 93 %  Body mass index is 35.77 kg/m .    GENERAL:  Alert, oriented; no distress ; we used the hearing augmentation device    HEAD:  Normocephalic, without obvious abnormality, atraumatic   EYES:  PERRL, conjunctiva/corneas clear, no scleral icterus, EOM's intact   NOSE: Nares normal, septum midline, mucosa normal, no drainage   THROAT: Lips, mucosa, and tongue  normal; teeth and gums normal, mouth moist   NECK: Supple, symmetrical, trachea midline   BACK:   Symmetric, no curvature, ROM normal   LUNGS:   Clear to auscultation bilaterally, no rales, rhonchi, or wheezing, symmetric chest rise on inhalation, respirations unlabored   CHEST WALL:  No tenderness or deformity   HEART:  Regular rate and rhythm, S1 and S2 normal, no murmur, rub, or gallop    ABDOMEN:   Soft, non-tender, bowel sounds active all four quadrants, no masses, no organomegaly, no rebound or guarding   EXTREMITIES: Extremities normal, atraumatic, no cyanosis or edema    SKIN: Dry to touch, no exanthems in the visualized areas   NEURO: Alert, oriented x3, moves all four extremities freely   PSYCH: Cooperative, behavior is appropriate      Data reviewed today: I personally reviewed all new medications, labs, imaging/diagnostics reports over the past 24 hours. Pertinent findings include:    Imaging:   No results found for this or any previous visit (from the past 24 hours).    Labs:  Most Recent 3 BMP's:  Recent Labs   Lab Test 03/30/25  1212 03/30/25  0733 03/30/25  0204 03/25/25  0802 03/25/25  0745 03/24/25  1850 03/24/25  1454 02/06/24  0812 02/06/24  0622 02/02/24  0833 02/02/24  0627   NA  --   --   --   --  141  --  143  --   --   --  139   POTASSIUM  --   --   --   --  4.0  --  3.3*  --   --   --  4.2   CHLORIDE  --   --   --   --  102  --  100  --   --   --  100   CO2  --   --   --   --  28  --  29  --   --   --  30*   BUN  --   --   --   --  14.6  --  16.3  --   --   --  23.1*   CR  --   --   --   --  1.01*  --  1.07*  --  0.81   < > 0.90   ANIONGAP  --   --   --   --  11  --  14  --   --   --  9   MACY  --   --   --   --  9.1  --  9.5  --   --   --  9.5   * 166* 206*   < > 187*   < > 129*   < >  --    < > 68*    < > = values in this interval not displayed.       Medications:   Personally Reviewed.  Medications   Current Facility-Administered Medications   Medication Dose Route Frequency  Provider Last Rate Last Admin     Current Facility-Administered Medications   Medication Dose Route Frequency Provider Last Rate Last Admin    busPIRone (BUSPAR) tablet 7.5 mg  7.5 mg Oral BID Kristian Stearns MD   7.5 mg at 03/30/25 0929    DULoxetine (CYMBALTA) DR capsule 60 mg  60 mg Oral QAM Kristian Stearns MD   60 mg at 03/30/25 0929    enoxaparin ANTICOAGULANT (LOVENOX) injection 40 mg  40 mg Subcutaneous Daily Kristian Stearns MD   40 mg at 03/29/25 1345    ferrous sulfate (FEROSUL) tablet 325 mg  325 mg Oral BID Kristian Stearns MD   325 mg at 03/30/25 0930    hydrOXYzine HCl (ATARAX) tablet 25 mg  25 mg Oral BID Kristian Stearns MD   25 mg at 03/30/25 0928    insulin aspart (NovoLOG) injection (RAPID ACTING)  1-7 Units Subcutaneous TID AC Kristian Stearns MD   2 Units at 03/30/25 1244    insulin aspart (NovoLOG) injection (RAPID ACTING)  1-5 Units Subcutaneous At Bedtime Kristian Stearns MD   1 Units at 03/29/25 2134    lamoTRIgine (LaMICtal) tablet 100 mg  100 mg Oral BID Kristian Stearns MD   100 mg at 03/30/25 0929    mirabegron (MYRBETRIQ) 24 hr tablet 50 mg  50 mg Oral Daily Kristian Stearns MD   50 mg at 03/30/25 0929    pantoprazole (PROTONIX) EC tablet 40 mg  40 mg Oral Daily Kristian Stearns MD   40 mg at 03/30/25 0929    predniSONE (DELTASONE) tablet 6 mg  6 mg Oral Daily Kristian Stearns MD   6 mg at 03/30/25 0929    pregabalin (LYRICA) capsule 100 mg  100 mg Oral TID Kristian Stearns MD   100 mg at 03/30/25 0929    rosuvastatin (CRESTOR) tablet 10 mg  10 mg Oral Daily Kristian Stearns MD   10 mg at 03/30/25 0929    sennosides (SENOKOT) tablet 8.6 mg  8.6 mg Oral BID Kristian Stearns MD   8.6 mg at 03/30/25 0929    tolterodine (DETROL) tablet 2 mg  2 mg Oral BID Kristian Stearns MD   2 mg at 03/30/25 0969    triamterene-HCTZ (DYAZIDE) 37.5-25 MG per capsule 1 capsule  1 capsule Oral QAM Kristian Stearns MD   1 capsule at 03/30/25 3651

## 2025-03-30 NOTE — PLAN OF CARE
Problem: Adult Inpatient Plan of Care  Goal: Absence of Hospital-Acquired Illness or Injury  Intervention: Prevent Skin Injury  Recent Flowsheet Documentation  Taken 3/29/2025 2046 by Eden Graham RN  Body Position: weight shifting     Problem: Adult Inpatient Plan of Care  Goal: Optimal Comfort and Wellbeing  Intervention: Monitor Pain and Promote Comfort  Recent Flowsheet Documentation  Taken 3/29/2025 2046 by Eden Graham RN  Pain Management Interventions: medication (see MAR)  Intervention: Provide Person-Centered Care  Recent Flowsheet Documentation  Taken 3/29/2025 2046 by Eden Graham RN  Trust Relationship/Rapport:   emotional support provided   empathic listening provided   thoughts/feelings acknowledged   care explained   choices provided   Goal Outcome Evaluation:       Pt is alert and oriented but forgetful. Pain is always at 8/10 even with scheduled and PRN pain and relaxant meds. Pt refused to complete the bowel prep. Pt unable to tolerate drinking Miralax. There's still half a bottle left of Miralax. No BM since suppository and enema was given by PM staff. Referred to Dr. Wilks. MD prescribed Docusate with no effect until this time of writing. Repositioned for pressure relief but refused sometimes. VSS on RA,

## 2025-03-30 NOTE — PLAN OF CARE
A&Ox4. Anxious, PRN Valium. Very [place], pocket talker used. Diet changed to clear liquids with BG checks. Up A1-2. Weight shifting encouraged. C/o back pain to left leg pain, PRN Oxycodone, Robaxin given. LS diminished wtth nspiratory wheezes thsi AM. BS active. Purewick in place. C/o constipation, Miralax prep given, almost finished 238g. No IV access. Discharge pending to TCU. Continue to monitor     Goal Outcome Evaluation:    Problem: Skin Injury Risk Increased  Goal: Skin Health and Integrity  Intervention: Optimize Skin Protection  Recent Flowsheet Documentation  Taken 3/30/2025 1245 by Jennifer Child, RN  Head of Bed (HOB) Positioning: HOB at 30 degrees  Taken 3/30/2025 1030 by Jennifer Child, RN  Head of Bed (HOB) Positioning: HOB at 30-45 degrees     Problem: Fall Injury Risk  Goal: Absence of Fall and Fall-Related Injury  Outcome: Progressing

## 2025-03-31 ENCOUNTER — DOCUMENTATION ONLY (OUTPATIENT)
Dept: GERIATRICS | Facility: CLINIC | Age: 85
End: 2025-03-31
Payer: MEDICARE

## 2025-03-31 ENCOUNTER — APPOINTMENT (OUTPATIENT)
Dept: CT IMAGING | Facility: CLINIC | Age: 85
DRG: 552 | End: 2025-03-31
Attending: EMERGENCY MEDICINE
Payer: MEDICARE

## 2025-03-31 LAB
GLUCOSE BLDC GLUCOMTR-MCNC: 124 MG/DL (ref 70–99)
GLUCOSE BLDC GLUCOMTR-MCNC: 182 MG/DL (ref 70–99)
GLUCOSE BLDC GLUCOMTR-MCNC: 192 MG/DL (ref 70–99)
GLUCOSE BLDC GLUCOMTR-MCNC: 222 MG/DL (ref 70–99)

## 2025-03-31 PROCEDURE — 255N000002 HC RX 255 OP 636: Performed by: EMERGENCY MEDICINE

## 2025-03-31 PROCEDURE — 74176 CT ABD & PELVIS W/O CONTRAST: CPT

## 2025-03-31 PROCEDURE — 250N000009 HC RX 250: Performed by: EMERGENCY MEDICINE

## 2025-03-31 PROCEDURE — 250N000011 HC RX IP 250 OP 636: Performed by: HOSPITALIST

## 2025-03-31 PROCEDURE — 250N000013 HC RX MED GY IP 250 OP 250 PS 637: Performed by: EMERGENCY MEDICINE

## 2025-03-31 PROCEDURE — 120N000001 HC R&B MED SURG/OB

## 2025-03-31 PROCEDURE — 99232 SBSQ HOSP IP/OBS MODERATE 35: CPT | Performed by: EMERGENCY MEDICINE

## 2025-03-31 PROCEDURE — 250N000012 HC RX MED GY IP 250 OP 636 PS 637: Performed by: HOSPITALIST

## 2025-03-31 PROCEDURE — 250N000013 HC RX MED GY IP 250 OP 250 PS 637: Performed by: HOSPITALIST

## 2025-03-31 RX ORDER — LACTULOSE 10 G/15ML
20 SOLUTION ORAL ONCE
Status: DISCONTINUED | OUTPATIENT
Start: 2025-03-31 | End: 2025-03-31

## 2025-03-31 RX ADMIN — PREGABALIN 100 MG: 100 CAPSULE ORAL at 09:59

## 2025-03-31 RX ADMIN — BUSPIRONE HYDROCHLORIDE 7.5 MG: 7.5 TABLET ORAL at 20:32

## 2025-03-31 RX ADMIN — OXYCODONE HYDROCHLORIDE 5 MG: 5 TABLET ORAL at 05:49

## 2025-03-31 RX ADMIN — PREGABALIN 100 MG: 100 CAPSULE ORAL at 20:33

## 2025-03-31 RX ADMIN — LAMOTRIGINE 100 MG: 100 TABLET ORAL at 09:58

## 2025-03-31 RX ADMIN — SENNOSIDES 8.6 MG: 8.6 TABLET, FILM COATED ORAL at 09:59

## 2025-03-31 RX ADMIN — HYDROXYZINE HYDROCHLORIDE 25 MG: 25 TABLET ORAL at 20:31

## 2025-03-31 RX ADMIN — INSULIN ASPART 2 UNITS: 100 INJECTION, SOLUTION INTRAVENOUS; SUBCUTANEOUS at 18:18

## 2025-03-31 RX ADMIN — TOLTERODINE TARTRATE 2 MG: 1 TABLET ORAL at 09:58

## 2025-03-31 RX ADMIN — DULOXETINE 60 MG: 30 CAPSULE, DELAYED RELEASE ORAL at 09:58

## 2025-03-31 RX ADMIN — OXYCODONE 10 MG: 5 TABLET ORAL at 17:27

## 2025-03-31 RX ADMIN — SENNOSIDES 8.6 MG: 8.6 TABLET, FILM COATED ORAL at 20:33

## 2025-03-31 RX ADMIN — PREGABALIN 100 MG: 100 CAPSULE ORAL at 13:13

## 2025-03-31 RX ADMIN — DIPHENHYDRAMINE HYDROCHLORIDE 25 MG: 25 CAPSULE ORAL at 12:12

## 2025-03-31 RX ADMIN — ROSUVASTATIN CALCIUM 10 MG: 10 TABLET, FILM COATED ORAL at 09:59

## 2025-03-31 RX ADMIN — ENOXAPARIN SODIUM 40 MG: 40 INJECTION SUBCUTANEOUS at 13:13

## 2025-03-31 RX ADMIN — PREDNISONE 6 MG: 1 TABLET ORAL at 09:59

## 2025-03-31 RX ADMIN — INSULIN ASPART 1 UNITS: 100 INJECTION, SOLUTION INTRAVENOUS; SUBCUTANEOUS at 12:22

## 2025-03-31 RX ADMIN — OXYCODONE HYDROCHLORIDE 5 MG: 5 TABLET ORAL at 06:00

## 2025-03-31 RX ADMIN — MIRABEGRON 50 MG: 25 TABLET, FILM COATED, EXTENDED RELEASE ORAL at 09:59

## 2025-03-31 RX ADMIN — TOLTERODINE TARTRATE 2 MG: 1 TABLET ORAL at 20:33

## 2025-03-31 RX ADMIN — TRIAMTERENE AND HYDROCHLOROTHIAZIDE 1 CAPSULE: 37.5; 25 CAPSULE ORAL at 09:59

## 2025-03-31 RX ADMIN — FERROUS SULFATE TAB 325 MG (65 MG ELEMENTAL FE) 325 MG: 325 (65 FE) TAB at 09:59

## 2025-03-31 RX ADMIN — LAMOTRIGINE 100 MG: 100 TABLET ORAL at 20:34

## 2025-03-31 RX ADMIN — BENZOCAINE AND MENTHOL 1 LOZENGE: 15; 3.6 LOZENGE ORAL at 20:39

## 2025-03-31 RX ADMIN — PANTOPRAZOLE SODIUM 40 MG: 40 TABLET, DELAYED RELEASE ORAL at 09:59

## 2025-03-31 RX ADMIN — DIATRIZOATE MEGLUMINE AND DIATRIZOATE SODIUM 150 ML: 660; 100 LIQUID ORAL; RECTAL at 14:13

## 2025-03-31 RX ADMIN — HYDROXYZINE HYDROCHLORIDE 25 MG: 25 TABLET ORAL at 09:59

## 2025-03-31 RX ADMIN — OXYCODONE 10 MG: 5 TABLET ORAL at 13:19

## 2025-03-31 RX ADMIN — BUSPIRONE HYDROCHLORIDE 7.5 MG: 7.5 TABLET ORAL at 09:59

## 2025-03-31 ASSESSMENT — ACTIVITIES OF DAILY LIVING (ADL)
ADLS_ACUITY_SCORE: 66
ADLS_ACUITY_SCORE: 63
ADLS_ACUITY_SCORE: 66
ADLS_ACUITY_SCORE: 62
ADLS_ACUITY_SCORE: 68
ADLS_ACUITY_SCORE: 66
ADLS_ACUITY_SCORE: 63
ADLS_ACUITY_SCORE: 66
ADLS_ACUITY_SCORE: 70
ADLS_ACUITY_SCORE: 68
ADLS_ACUITY_SCORE: 70
ADLS_ACUITY_SCORE: 68
ADLS_ACUITY_SCORE: 66
ADLS_ACUITY_SCORE: 63
ADLS_ACUITY_SCORE: 68
ADLS_ACUITY_SCORE: 68
ADLS_ACUITY_SCORE: 70
ADLS_ACUITY_SCORE: 65
ADLS_ACUITY_SCORE: 65
ADLS_ACUITY_SCORE: 62

## 2025-03-31 NOTE — PROGRESS NOTES
Care Management Follow Up    Length of Stay (days): 6    Expected Discharge Date: 04/01/2025     Concerns to be Addressed: discharge planning     Patient plan of care discussed at interdisciplinary rounds: Yes    Anticipated Discharge Disposition:  ([facility])     Anticipated Discharge Services: Transportation Services  Anticipated Discharge DME: None    Patient/family educated on Medicare website which has current facility and service quality ratings: yes  Education Provided on the Discharge Plan: Yes (AVS per bedside RN)  Patient/Family in Agreement with the Plan: yes    Referrals Placed by CM/SW: Post Acute Facilities  Private pay costs discussed: transportation costs    Discussed  Partnership in Safe Discharge Planning  document with patient/family: Yes     Handoff Completed: No, handoff not indicated or clinically appropriate    Additional Information:  11:02 AM  Pt is not medically ready for discharge. Cm rescheduled [facility] w/c transport for tomorrow between 1240-1320pm    Next Steps: Medical clearance.     ANANTH Villalba

## 2025-03-31 NOTE — PROGRESS NOTES
[facility] MEDICINE PROGRESS NOTE      Summary: 84 year old female into [facility] on 3/24/2025 after arriving to the ER for a mechanical fall.      PMHx includes Menieres disease, hearing loss, GERD, CKD, bipolar, anxiety, steroid dependent for menieres,    Diagnostics on arrival to the ER included an MRI of the spine.  It showed severe spinal canal stenosis most specifically at L2-L5.    Hospital course is marked by neurosurgery consultation regarding her lumbar stenosis.  They offered her possible operative intervention as an outpatient.    3/30/2025:  I have not had a BM in 1 week; pt willing to have a miralax Prep today; change diet to clears;   3/31/2025:  no success in having a BM; she drank all her miralax;   Will add gastrogafin contrast today as adjunctive tx;   CT of the abdomen confirms evidence of moderate stool burden    Problem list:     Constipation: Add Gastrografin, lactulose  Lumbar stenosis due to degenerative disease: outpatient follow up though is affecting her ability to ambulate safely , on lyrica  DM2:  continue jardiance, glipizide, metformin  Lymphedema: continue compression stockings  Menieres disease: chronic prednisone, maxzide  Mood disorder:  continue buspar, cymbalta, lamictal  Opiate use due to ongoing lumbar stenosis: causing constipation;  Will hopefully resolve the bowel issue today; needs a better  Daily bowel plan    8.  Dvt prevention:  lovenox   9.  Disposition:  Medically Ready for Discharge: Anticipated in 2-4 Days       Yolanda Alaniz MD  [facility]  [facility]  Phone: #[phone]  Securely message with the Vocera Web Console (learn more here)  Text page via [employer] Paging/Directory     Interval History/Subjective:  I still need to poop     Physical Exam/Objective:  Temp:  [97.2  F (36.2  C)-98.3  F (36.8  C)] 98.3  F (36.8  C)  Pulse:  [77-84] 84  Resp:  [20] 20  BP: (122-149)/(57-67)  149/67  SpO2:  [92 %-95 %] 95 %  Body mass index is 35.77 kg/m .    GENERAL:  Alert, oriented; no distress ; we used the hearing augmentation device    HEAD:  Normocephalic, without obvious abnormality, atraumatic   EYES:  PERRL, conjunctiva/corneas clear, no scleral icterus, EOM's intact   NOSE: Nares normal, septum midline, mucosa normal, no drainage   THROAT: Lips, mucosa, and tongue normal; teeth and gums normal, mouth moist   NECK: Supple, symmetrical, trachea midline   BACK:   Symmetric, no curvature, ROM normal   LUNGS:   Clear to auscultation bilaterally, no rales, rhonchi, or wheezing, symmetric chest rise on inhalation, respirations unlabored   CHEST WALL:  No tenderness or deformity   HEART:  Regular rate and rhythm, S1 and S2 normal, no murmur, rub, or gallop    ABDOMEN:   Soft, non-tender, bowel sounds active all four quadrants, no masses, no organomegaly, no rebound or guarding   EXTREMITIES: Extremities normal, atraumatic, no cyanosis or edema    SKIN: Dry to touch, no exanthems in the visualized areas   NEURO: Alert, oriented x3, moves all four extremities freely   PSYCH: Cooperative, behavior is appropriate      Data reviewed today: I personally reviewed all new medications, labs, imaging/diagnostics reports over the past 24 hours. Pertinent findings include:    Imaging:   Recent Results (from the past 24 hours)   CT Abdomen Pelvis w/o Contrast    Narrative    EXAM: CT ABDOMEN PELVIS W/O CONTRAST  LOCATION: [facility]  DATE: 3/31/2025    INDICATION: constipation  COMPARISON: 5/22/2023  TECHNIQUE: CT scan of the abdomen and pelvis was performed without IV contrast. Multiplanar reformats were obtained. Dose reduction techniques were used.  CONTRAST: None.    FINDINGS:   LOWER CHEST: Patchy bibasilar atelectasis/scarring. Coronary atherosclerosis. Small hiatal hernia.    HEPATOBILIARY: Hepatic steatosis. Small volume biliary sludge.    PANCREAS: Normal.    SPLEEN:  Normal.    ADRENAL GLANDS: Normal.    KIDNEYS/BLADDER: No significant mass, stones, or hydronephrosis. There are simple or benign cysts. No follow up is needed.    BOWEL: Diverticulosis of the colon. No acute inflammatory change. No obstruction. Moderate colonic stool burden. Some of the stool burden is more mixed density proximally. Normal appendix.    LYMPH NODES: Normal.    VASCULATURE: No abdominal aortic aneurysm.    PELVIC ORGANS: Small calcified fibroid.    MUSCULOSKELETAL: Degenerative changes of the spine and bilateral hips. Mild anasarca. Small periumbilical ventral hernia. Injection sites along the anterior abdominal wall and gluteal soft tissues.      Impression    IMPRESSION:   1.  Moderate colonic stool burden. Some of the stool burden is mixed density proximally which may indicate impending diarrheal state.  2.  Hepatic steatosis.  3.  Diverticulosis.         Labs:  Most Recent 3 BMP's:  Recent Labs   Lab Test 03/31/25  1220 03/31/25  0815 03/30/25  2153 03/25/25  0802 03/25/25  0745 03/24/25  1850 03/24/25  1454 02/06/24  0812 02/06/24  0622 02/02/24  0833 02/02/24  0627   NA  --   --   --   --  141  --  143  --   --   --  139   POTASSIUM  --   --   --   --  4.0  --  3.3*  --   --   --  4.2   CHLORIDE  --   --   --   --  102  --  100  --   --   --  100   CO2  --   --   --   --  28  --  29  --   --   --  30*   BUN  --   --   --   --  14.6  --  16.3  --   --   --  23.1*   CR  --   --   --   --  1.01*  --  1.07*  --  0.81   < > 0.90   ANIONGAP  --   --   --   --  11  --  14  --   --   --  9   MACY  --   --   --   --  9.1  --  9.5  --   --   --  9.5   * 124* 182*   < > 187*   < > 129*   < >  --    < > 68*    < > = values in this interval not displayed.       Medications:   Personally Reviewed.  Medications   Current Facility-Administered Medications   Medication Dose Route Frequency Provider Last Rate Last Admin     Current Facility-Administered Medications   Medication Dose Route Frequency  Provider Last Rate Last Admin    busPIRone (BUSPAR) tablet 7.5 mg  7.5 mg Oral BID Kristian Stearns MD   7.5 mg at 03/31/25 0959    DULoxetine (CYMBALTA) DR capsule 60 mg  60 mg Oral QAM Kristian Stearns MD   60 mg at 03/31/25 0958    enoxaparin ANTICOAGULANT (LOVENOX) injection 40 mg  40 mg Subcutaneous Daily Kristian Stearns MD   40 mg at 03/31/25 1313    [Held by provider] ferrous sulfate (FEROSUL) tablet 325 mg  325 mg Oral BID Kristian Stearns MD   325 mg at 03/31/25 0959    hydrOXYzine HCl (ATARAX) tablet 25 mg  25 mg Oral BID Kristian Stearns MD   25 mg at 03/31/25 0959    insulin aspart (NovoLOG) injection (RAPID ACTING)  1-7 Units Subcutaneous TID AC Kristian Stearns MD   1 Units at 03/31/25 1222    insulin aspart (NovoLOG) injection (RAPID ACTING)  1-5 Units Subcutaneous At Bedtime Kristian Stearns MD   1 Units at 03/29/25 2134    lamoTRIgine (LaMICtal) tablet 100 mg  100 mg Oral BID Kristian Stearns MD   100 mg at 03/31/25 0958    mirabegron (MYRBETRIQ) 24 hr tablet 50 mg  50 mg Oral Daily Kristian Stearns MD   50 mg at 03/31/25 0959    pantoprazole (PROTONIX) EC tablet 40 mg  40 mg Oral Daily Kristian Stearns MD   40 mg at 03/31/25 0959    predniSONE (DELTASONE) tablet 6 mg  6 mg Oral Daily Kristian Stearns MD   6 mg at 03/31/25 0959    pregabalin (LYRICA) capsule 100 mg  100 mg Oral TID Kristian Stearns MD   100 mg at 03/31/25 1313    rosuvastatin (CRESTOR) tablet 10 mg  10 mg Oral Daily Kristian Stearns MD   10 mg at 03/31/25 0959    sennosides (SENOKOT) tablet 8.6 mg  8.6 mg Oral BID Kristian Stearns MD   8.6 mg at 03/31/25 0959    tolterodine (DETROL) tablet 2 mg  2 mg Oral BID Kristian Stearns MD   2 mg at 03/31/25 0958    triamterene-HCTZ (DYAZIDE) 37.5-25 MG per capsule 1 capsule  1 capsule Oral QA Kristian Stearns MD   1 capsule at 03/31/25 0959

## 2025-03-31 NOTE — PROGRESS NOTES
"Pt A&Ox4. Endorses frustration with difficulty hearing throughout shift, stating she feels exhausted by her situation. Active listening and emotional support provided throughout shift. slow, clear speaking and repetition utilized, helped patient properly place hearing aids, using pocket talker at times as well. Anxious this morning, scheduled atarax helped. Son at bedside intermittently throughout shift. Compression stockings placed per pt and son request. CT was obtained this afternoon, diet upgraded to 60g carb, and gastrografin given in hopes of BM-- mayra garcia MD ordered GI consult for next steps. Abdomen hard, distended, with hypoactive but audible bowel sounds. Pt states \"It feels like it is right there but I cannot go\" (referring to BM). And states it feels like she is having contractions with intermittent cramping pain in abdomen area. Pt did decline PT/OT this afternoon as lunch had arrived and pt felt exhausted from other activities throughout the day. PRN oxycodone given for chronic pain and pain r/t pressure injury. Weight shifting in chair, pillows utilized in bed with Q2 repositioning. Encouraged ambulation/ OOB activity, but pt very anxious about falling and weakness in BLE. Did sit in the chair around lunchtime. Lozenges PRN for sore/dry throat.   Discharge to TCU pending.     *Large BM at end of shift, consider cancelling GI consult*  "

## 2025-03-31 NOTE — PLAN OF CARE
"  Problem: Constipation  Goal: Effective Bowel Elimination  Outcome: Progressing     Problem: Fall Injury Risk  Goal: Absence of Fall and Fall-Related Injury  Intervention: Identify and Manage Contributors  Recent Flowsheet Documentation  Taken 3/30/2025 2303 by Nadia Strong, RN  Medication Review/Management: medications reviewed     Problem: Skin Injury Risk Increased  Goal: Skin Health and Integrity  Intervention: Plan: Nurse Driven Intervention: Moisture Management  Recent Flowsheet Documentation  Taken 3/30/2025 2303 by Nadia Strong RN  Moisture Interventions:   Incontinence pad   Urinary collection device  Bathing/Skin Care: incontinence care   Goal Outcome Evaluation:      Pt A/O x4. Lets needs known. Very [place]. Uses microphones or hearing aid for communication.  VSS. Afebrile. /57 (BP Location: Left arm)   Pulse 77   Temp 97.2  F (36.2  C) (Oral)   Resp 20   Ht 1.651 m (5' 5\")   Wt 97.5 kg (214 lb 15.2 oz)   LMP  (LMP Unknown)   SpO2 92%   BMI 35.77 kg/m         Pt denies chest pain/palpitation, dizziness. Nausea, SOB..   Pt reported lt leg and back pain. Medications administered. Reported pain decreased.   Pt voiding via purwicke. No BM yet. Had miralax. Pt reported passed gasx1.   Repositioned in bed Q2 hours or PRN. Commode by bed side. Call light with in reach.            "

## 2025-04-01 ENCOUNTER — LAB REQUISITION (OUTPATIENT)
Dept: LAB | Facility: CLINIC | Age: 85
End: 2025-04-01
Payer: MEDICARE

## 2025-04-01 VITALS
DIASTOLIC BLOOD PRESSURE: 65 MMHG | SYSTOLIC BLOOD PRESSURE: 143 MMHG | WEIGHT: 218.7 LBS | OXYGEN SATURATION: 91 % | BODY MASS INDEX: 36.44 KG/M2 | RESPIRATION RATE: 18 BRPM | TEMPERATURE: 98.2 F | HEART RATE: 72 BPM | HEIGHT: 65 IN

## 2025-04-01 DIAGNOSIS — F41.1 GAD (GENERALIZED ANXIETY DISORDER): ICD-10-CM

## 2025-04-01 DIAGNOSIS — Z11.1 ENCOUNTER FOR SCREENING FOR RESPIRATORY TUBERCULOSIS: ICD-10-CM

## 2025-04-01 DIAGNOSIS — G89.4 CHRONIC PAIN SYNDROME: Primary | ICD-10-CM

## 2025-04-01 PROBLEM — M05.741 RHEUMATOID ARTHRITIS INVOLVING BOTH HANDS WITH POSITIVE RHEUMATOID FACTOR (H): Status: RESOLVED | Noted: 2023-12-14 | Resolved: 2025-04-01

## 2025-04-01 PROBLEM — E66.812 CLASS 2 SEVERE OBESITY WITH BODY MASS INDEX (BMI) OF 35 TO 39.9 WITH SERIOUS COMORBIDITY (H): Status: ACTIVE | Noted: 2017-10-24

## 2025-04-01 PROBLEM — J96.01 ACUTE RESPIRATORY FAILURE WITH HYPOXIA (H): Status: RESOLVED | Noted: 2024-02-08 | Resolved: 2025-04-01

## 2025-04-01 PROBLEM — M05.742 RHEUMATOID ARTHRITIS INVOLVING BOTH HANDS WITH POSITIVE RHEUMATOID FACTOR (H): Status: RESOLVED | Noted: 2023-12-14 | Resolved: 2025-04-01

## 2025-04-01 PROBLEM — E66.01 CLASS 2 SEVERE OBESITY WITH BODY MASS INDEX (BMI) OF 35 TO 39.9 WITH SERIOUS COMORBIDITY (H): Status: ACTIVE | Noted: 2017-10-24

## 2025-04-01 LAB
GLUCOSE BLDC GLUCOMTR-MCNC: 141 MG/DL (ref 70–99)
GLUCOSE BLDC GLUCOMTR-MCNC: 160 MG/DL (ref 70–99)
GLUCOSE BLDC GLUCOMTR-MCNC: 214 MG/DL (ref 70–99)

## 2025-04-01 PROCEDURE — 250N000013 HC RX MED GY IP 250 OP 250 PS 637: Performed by: EMERGENCY MEDICINE

## 2025-04-01 PROCEDURE — 99238 HOSP IP/OBS DSCHRG MGMT 30/<: CPT | Performed by: EMERGENCY MEDICINE

## 2025-04-01 PROCEDURE — 250N000012 HC RX MED GY IP 250 OP 636 PS 637: Performed by: HOSPITALIST

## 2025-04-01 PROCEDURE — 250N000013 HC RX MED GY IP 250 OP 250 PS 637: Performed by: HOSPITALIST

## 2025-04-01 RX ORDER — DIAZEPAM 2 MG/1
2 TABLET ORAL 2 TIMES DAILY PRN
Qty: 28 TABLET | Refills: 0 | Status: SHIPPED | OUTPATIENT
Start: 2025-04-01 | End: 2025-04-02

## 2025-04-01 RX ORDER — POLYETHYLENE GLYCOL 3350 17 G/17G
17 POWDER, FOR SOLUTION ORAL 2 TIMES DAILY
Status: DISCONTINUED | OUTPATIENT
Start: 2025-04-01 | End: 2025-04-01 | Stop reason: HOSPADM

## 2025-04-01 RX ORDER — PREGABALIN 100 MG/1
100 CAPSULE ORAL 3 TIMES DAILY
Qty: 90 CAPSULE | Refills: 0 | Status: SHIPPED | OUTPATIENT
Start: 2025-04-01

## 2025-04-01 RX ORDER — FERROUS SULFATE 325(65) MG
325 TABLET ORAL EVERY OTHER DAY
DISCHARGE
Start: 2025-04-01

## 2025-04-01 RX ORDER — OXYCODONE HYDROCHLORIDE 5 MG/1
5 TABLET ORAL EVERY 4 HOURS PRN
Qty: 60 TABLET | Refills: 0 | Status: SHIPPED | OUTPATIENT
Start: 2025-04-01 | End: 2025-04-02

## 2025-04-01 RX ADMIN — BENZOCAINE AND MENTHOL 1 LOZENGE: 15; 3.6 LOZENGE ORAL at 09:57

## 2025-04-01 RX ADMIN — LAMOTRIGINE 100 MG: 100 TABLET ORAL at 08:27

## 2025-04-01 RX ADMIN — TOLTERODINE TARTRATE 2 MG: 1 TABLET ORAL at 08:27

## 2025-04-01 RX ADMIN — PREGABALIN 100 MG: 100 CAPSULE ORAL at 08:27

## 2025-04-01 RX ADMIN — INSULIN ASPART 2 UNITS: 100 INJECTION, SOLUTION INTRAVENOUS; SUBCUTANEOUS at 12:19

## 2025-04-01 RX ADMIN — BUSPIRONE HYDROCHLORIDE 7.5 MG: 7.5 TABLET ORAL at 08:27

## 2025-04-01 RX ADMIN — ROSUVASTATIN CALCIUM 10 MG: 10 TABLET, FILM COATED ORAL at 08:26

## 2025-04-01 RX ADMIN — OXYCODONE 10 MG: 5 TABLET ORAL at 09:44

## 2025-04-01 RX ADMIN — PREDNISONE 6 MG: 1 TABLET ORAL at 08:26

## 2025-04-01 RX ADMIN — PANTOPRAZOLE SODIUM 40 MG: 40 TABLET, DELAYED RELEASE ORAL at 08:27

## 2025-04-01 RX ADMIN — INSULIN ASPART 1 UNITS: 100 INJECTION, SOLUTION INTRAVENOUS; SUBCUTANEOUS at 08:18

## 2025-04-01 RX ADMIN — OXYCODONE HYDROCHLORIDE 5 MG: 5 TABLET ORAL at 05:03

## 2025-04-01 RX ADMIN — DULOXETINE 60 MG: 30 CAPSULE, DELAYED RELEASE ORAL at 08:27

## 2025-04-01 RX ADMIN — MIRABEGRON 50 MG: 25 TABLET, FILM COATED, EXTENDED RELEASE ORAL at 08:26

## 2025-04-01 RX ADMIN — HYDROXYZINE HYDROCHLORIDE 25 MG: 25 TABLET ORAL at 08:26

## 2025-04-01 RX ADMIN — TRIAMTERENE AND HYDROCHLOROTHIAZIDE 1 CAPSULE: 37.5; 25 CAPSULE ORAL at 08:26

## 2025-04-01 ASSESSMENT — ACTIVITIES OF DAILY LIVING (ADL)
ADLS_ACUITY_SCORE: 62
ADLS_ACUITY_SCORE: 60
ADLS_ACUITY_SCORE: 62
ADLS_ACUITY_SCORE: 60
ADLS_ACUITY_SCORE: 60
ADLS_ACUITY_SCORE: 62

## 2025-04-01 NOTE — PROGRESS NOTES
Occupational Therapy Discharge Summary    Reason for therapy discharge:    Discharged to transitional care facility.    Progress towards therapy goal(s). See goals on Care Plan in [facility] electronic health record for goal details.  Goals partially met.  Barriers to achieving goals:   discharge from facility.    Therapy recommendation(s):    Continued therapy is recommended.  Rationale/Recommendations:  To increase indep with ADLs and trsfs.

## 2025-04-01 NOTE — PROGRESS NOTES
Discharge AVS reviewed with patient and son.  Questions answered and teachings acknowledged.  Belongings and paperwork sent with via [facility] transport. [facility] [employer] acknowledged (N/A)

## 2025-04-01 NOTE — PROGRESS NOTES
Care Management Discharge Note    Discharge Date: 04/01/2025       Discharge Disposition:  ([facility])    Discharge Services: Transportation Services    Discharge DME: None    Discharge Transportation: [facility] between 12:40-1:20 pm    Private pay costs discussed: transportation costs    Does the patient's insurance plan have a 3 day qualifying hospital stay waiver?  No    PAS Confirmation Code: [id number]  Patient/family educated on Medicare website which has current facility and service quality ratings: yes    Education Provided on the Discharge Plan: Yes (AVS per bedside RN)  Persons Notified of Discharge Plans: pt, yan Broderick, TCU  Patient/Family in Agreement with the Plan: yes    Handoff Referral Completed: No, handoff not indicated or clinically appropriate    Additional Information:  9:10 AM  Pt medically ready to discharge to [facility] via WC between 12:40-1:20 pm.  [facility] to fax discharge orders.  PAS done.  SW updated TCU and yan Broderick around discharge.  No further discharge needs.    JUD BairdSW

## 2025-04-01 NOTE — PROGRESS NOTES
[facility] GERIATRICS    PRIMARY CARE PROVIDER AND CLINIC:  JUAN ANTONIO John CNP, [facility] [address] [facility], [place] / [place] [address]  Chief Complaint   Patient presents with    Hospital F/U      [place] Medical Record Number:  [id number]  Place of Service where encounter took place:  [facility] (TCU)(SNF) [[id number]]    Jumana Yang  is a 84 year old  (1940), admitted to the above facility from  [facility]. Hospital stay 3/24/25 through 4/1/25..   HPI:    Jumana Yang is a 84 year old female presenting for leg pain and weakness following a fall.  She is clinically stable.  She has a known history of lumbar spinal stenosis as well as previously demonstrated L5 nerve impingement.  By her report, she has had a chronic worsening of her functional status over time.  The lower extremity weakness today appeared to precede her fall.  There appears to have been some progression of lumbar spinal canal stenosis compared to the MRI from 9/25/24.  Given these findings, neurosurgery consult ordered.  Plan for pain control in the meantime.     The primary encounter diagnosis was Physical deconditioning. Diagnoses of Generalized muscle weakness, Personal history of fall, Chronic pain syndrome, Cauda equina compression (H), Spinal stenosis, unspecified spinal region, Essential hypertension, Class 2 severe obesity with body mass index (BMI) of 35 to 39.9 with serious comorbidity (H), Dyslipidemia, Lymphedema, Stage 3a chronic kidney disease (H), Anxiety, Depression, unspecified depression type, Bipolar 1 disorder (H), Chronic anemia, Type 2 diabetes mellitus with diabetic nephropathy, without long-term current use of insulin (H), Chronic obstructive pulmonary disease, unspecified COPD type (H), Mild intermittent asthma without complication, Meniere's disease, unspecified laterality, OAB (overactive bladder), Dermatitis, and Dry skin were also pertinent to this  visit.    Met with patient who is very [place]. Prefers white board for communication. Able to read some lips very slowly. She denies any chest pain, palpitations, shortness of breath, LOPEZ, lightheadedness, dizziness, or cough. Denies any abdominal discomfort. Denies N&V. Denies B&B concerns. Denies dysuria or frequency. Denies loose or constipation now. Reports needing to received increased laxative during her hospitalization which helped but now stools are a bit on softer side. Appetite good. Sleeping well. Ongoing pain complaints to back area. Reports pain stable with current regimen.     BP Readings from Last 3 Encounters:   04/02/25 (!) 152/71   04/01/25 (!) 143/65   02/11/25 139/79     Wt Readings from Last 5 Encounters:   04/02/25 95.5 kg (210 lb 9.6 oz)   04/01/25 99.2 kg (218 lb 11.1 oz)   05/28/24 80.7 kg (178 lb)   02/14/24 93.4 kg (206 lb)   02/08/24 93.7 kg (206 lb 9.1 oz)     CODE STATUS/ADVANCE DIRECTIVES DISCUSSION:  Prior  DNR / DNI  ALLERGIES:   Allergies   Allergen Reactions    Propofol Nausea and Vomiting    Shellfish Allergy      Other reaction(s): Dizziness    Capsaicin Rash and Blisters    Capsaicin Blisters and Rash    Tegaderm Transparent Dressing (Informational Only) Itching and Rash      PAST MEDICAL HISTORY:   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    Hepatic abscess     Hyperlipidemia     Hypertension     Hypothyroidism     Irritable bowel syndrome     Meniere disease     Neuropathy     Noninfectious ileitis     Peritoneal abscess (H)     Spinal stenosis of lumbar region     Steroid long-term use     Vaginitis, atrophic,  postmenopausal     Vitamin D deficiency       PAST SURGICAL HISTORY:   has a past surgical history that includes Rectal surgery; Foot surgery; picc insertion (8/28/2013); GI surgery; orthopedic surgery; vascular surgery; Laparoscopic hepatectomy partial (11/4/2013); cataract iol, rt/lt (Left, 7/2013); IR Lumbar Sacral Transfor Injection Bilateral (Bilateral, 3/11/2022); and Release carpal tunnel (Left, 3/21/2023).  FAMILY HISTORY: family history includes Cerebrovascular Disease in her mother; Heart Disease in her brother, father, and mother.  SOCIAL HISTORY:   reports that she quit smoking about 37 years ago. Her smoking use included cigarettes. She has quit using smokeless tobacco. She reports that she does not currently use alcohol. She reports that she does not use drugs.  Patient's living condition: lives alone    Post Discharge Medication Reconciliation Status:   MED REC REQUIRED  Post Medication Reconciliation Status:  Discharge medications reconciled and changed, see notes/orders       Current Outpatient Medications   Medication Sig Dispense Refill    emollient (VANICREAM) external cream Apply to bilateral lower extremities daily 113 g 0    menthol-zinc oxide (CALMOSEPTINE) 0.44-20.6 % OINT ointment Apply to buttock  g 3    acetaminophen (TYLENOL) 500 MG tablet Take 2 tablets (1,000 mg) by mouth every 6 hours as needed for mild pain.      blood glucose (ACCU-CHEK FASTCLIX) lancing device FOR TESTING ONCE DAILY. DX  E11.9 TYPE 2 DIABETES      blood glucose (CONTOUR TEST) test strip TESTING EVERY DAY DX  E11.9      busPIRone (BUSPAR) 7.5 MG tablet Take 7.5 mg by mouth 2 times daily      calamine 8-8 % lotion Apply topically every hour as needed for itching.      CONTOUR NEXT TEST test strip TESTING EVERY DAY DX  E11.9      diazepam (VALIUM) 2 MG tablet Take 1 tablet (2 mg) by mouth 2 times daily as needed for anxiety (take one tablet (2 mg) by mouth up to twice daily as needed -- do not take on the same  day as your diazepam liquid solution). 28 tablet 0    DULoxetine (CYMBALTA) 60 MG capsule Take 60 mg by mouth every morning      empagliflozin (JARDIANCE) 25 MG TABS tablet Take 25 mg by mouth daily.      ferrous sulfate (FEROSUL) 325 (65 Fe) MG tablet Take 1 tablet (325 mg) by mouth every other day.      glipiZIDE (GLUCOTROL XL) 10 MG 24 hr tablet Take 10 mg by mouth daily.      hydrOXYzine (ATARAX) 25 MG tablet Take 25 mg by mouth 2 times daily.      lamoTRIgine (LAMICTAL) 100 MG tablet Take 100 mg by mouth 2 times daily      metFORMIN (GLUCOPHAGE XR) 500 MG 24 hr tablet Take 500 mg by mouth daily (with breakfast)      methocarbamol (ROBAXIN) 500 MG tablet Take 500 mg by mouth 3 times daily as needed.      miconazole (MICATIN) 2 % external powder Apply topically 2 times daily as needed for other (candidiasis/intertrigo) To skin folds      mirabegron (MYRBETRIQ) 50 MG 24 hr tablet Take 1 tablet (50 mg) by mouth daily. 90 tablet 2    omeprazole (PRILOSEC) 20 MG DR capsule Take 20 mg by mouth daily      oxyCODONE (ROXICODONE) 5 MG tablet Take 1 tablet (5 mg) by mouth every 4 hours as needed for pain. 60 tablet 0    polyethylene glycol (MIRALAX) 17 GM/Dose powder Take 1 Capful by mouth daily as needed for constipation.      predniSONE (DELTASONE) 1 MG tablet Take 1 mg by mouth every morning (In addition to the 5 mg dose; 1 mg + 5 mg = 6 mg)      predniSONE (DELTASONE) 5 MG tablet Take 5 mg by mouth every morning (In addition to the 1 mg dose; 5 mg + 1 mg = 6 mg)      pregabalin (LYRICA) 100 MG capsule Take 1 capsule (100 mg) by mouth 3 times daily. 90 capsule 0    rosuvastatin (CRESTOR) 10 MG tablet Take 1 tablet by mouth daily.      Semaglutide, 2 MG/DOSE, (OZEMPIC, 2 MG/DOSE,) 8 MG/3ML pen Inject 2 mg subcutaneously every 7 days. On Thursday      senna-docusate (SENOKOT-S/PERICOLACE) 8.6-50 MG tablet Take 1 tablet by mouth 2 times daily as needed for constipation.      solifenacin (VESICARE) 5 MG tablet Take 5 mg by  "mouth daily.      triamcinolone (KENALOG) 0.1 % external ointment Apply topically 2 times daily as needed for irritation.      triamterene-HCTZ (DYAZIDE) 37.5-25 MG capsule Take 1 capsule by mouth every morning Hold for SBP<110      trospium (SANCTURA) 20 MG tablet Take 1 tablet (20 mg) by mouth 2 times daily (before meals). 180 tablet 3     No current facility-administered medications for this visit.       ROS:  10 point ROS of systems including Constitutional, Eyes, Respiratory, Cardiovascular, Gastroenterology, Genitourinary, Integumentary, Musculoskeletal, Psychiatric were all negative except for pertinent positives noted in my HPI.    Vitals:  BP (!) 152/71   Pulse 74   Temp 97.5  F (36.4  C)   Resp 17   Ht 1.676 m (5' 6\")   Wt 95.5 kg (210 lb 9.6 oz)   LMP  (LMP Unknown)   SpO2 92%   BMI 33.99 kg/m    Exam:  GENERAL APPEARANCE:  Alert, in no distress, oriented, cooperative  ENT:  Mouth and posterior oropharynx normal, moist mucous membranes, [place]  EYES:  EOM, conjunctivae, lids, pupils and irises normal  RESP:  respiratory effort and palpation of chest normal, lungs clear to auscultation , no respiratory distress  CV:  regular rate and rhythm, no murmur, rub, or gallop, peripheral edema 1-2+ in BLE  ABDOMEN:  normal bowel sounds, soft, nontender, no hepatosplenomegaly or other masses, no guarding or rebound  M/S:   Maximum assistance for all ADLs, transfers and cares. Wheelchair bound.   SKIN:  Inspection of skin and subcutaneous tissue baseline, dry skin noted to bilateral lower extremities. See photos of sacral area  NEURO:   Cranial nerves 2-12 are normal tested and grossly at patient's baseline, no purposeful movement in upper and lower extremities  PSYCH:  oriented X 3, memory impaired , affect and mood normal        Lab/Diagnostic data:  Labs done in SNF are in [facility]. Please refer to them using [employer]/Care Everywhere. and Recent labs in [facility] reviewed by me today. "     ASSESSMENT/PLAN:    (R53.81) Physical deconditioning  (primary encounter diagnosis)  (M62.81) Generalized muscle weakness  Comment: Acute on chronic. S/T below diagnosis  Plan:   -Continue Physical therapy and Occupational therapy as directed  -SW to remain involved for safe discharge planning needs    (Z91.81) Personal history of fall  (G89.4) Chronic pain syndrome  (G83.4) Cauda equina compression (H)  (M48.00) Spinal stenosis, unspecified spinal region  Comment: Acute on chronic. Per hospital records-presenting for leg pain and weakness following a fall. She is clinically stable. She has a known history of lumbar spinal stenosis as well as previously demonstrated L5 nerve impingement. By her report, she has had a chronic worsening of her functional status over time. The lower extremity weakness today appeared to precede her fall. There appears to have been some progression of lumbar spinal canal stenosis compared to the MRI from 9/25/24.   Plan:   -Monitor pain complaints as directed  -Continue lyrica 100mg TID (max dose for CrCl)  -Continue Tylenol 1000mg every 6H PRN  -Continue robaxin 500 TID PRN  -Restarted oxycodone 5mg every 4 hours PRN  -CMP, CBC, vitamin D, vitamin B12, magnesium, and hgb A1c due 4/3/25    (I10) Essential hypertension  (E66.812,  E66.01) Class 2 severe obesity with body mass index (BMI) of 35 to 39.9 with serious comorbidity (H)  (E78.5) Dyslipidemia  (I89.0) Lymphedema  (N18.31) Stage 3a chronic kidney disease (H)  Comment: Chronic. Based on JNC-8 goals,  patients age of 84 year old, presence of diabetes or CKD, and goals of care goal BP is  <140/90 mm Hg. Patient is stable with current plan of care and routine assessment..  Plan:   -Monitor BP and HR  -Continue rosuvastatin 10mg daily  -start TG compression stockings to bilateral lower extremities daily. On in Am and off at HS  -Continue triamterene-hydrochlorothiazide 37.5-25mg daily.. HOLD if SBP<100  -CMP, CBC, vitamin D, vitamin  B12, magnesium, and hgb A1c due 4/3/25    (F41.9) Anxiety  (F32.A) Depression, unspecified depression type  (F31.9) Bipolar 1 disorder (H)  Comment: Chronic. Stable. I do not see any records from psych  Plan:   -Monitor mood and behaviors  -Monitor for worsening s/symptoms of concerns  -Monitor for changes in mobility, eating and sleeping patterns  -Continue buspirone 7.5mg BID  -Continue duloxetine 60mg daily  -Continue hydroxyzine 25mg BID  -Continue lamotrigine 100mg BID  -Discontinue diazepam given risks  -CMP, CBC, vitamin D, vitamin B12, magnesium, and hgb A1c due 4/3/25    (D64.9) Chronic anemia  Comment: Chronic. Baseline hgb~ 13  Plan:   -Monitor for bleeding risks  -Monitor for worsening s/symptoms of concerns  -Continue ferrous sulfate every other day. Recommend to stop if hgb >10 on next assessment  -CMP, CBC, vitamin D, vitamin B12, magnesium, and hgb A1c due 4/3/25    (H81.09) Meniere disease  Comment: Chronic. Followed with ENT--last visit back in 2022  Plan:  -Monitor for worsening s/symptoms of concerns  -ENT post TCU  -Continue prednisone 6mg daily (appears to be on this chronically since 2017 or so)  -CMP, CBC, vitamin D, vitamin B12, magnesium, and hgb A1c due 4/3/25    (L30.9) Dermatitis  Comment: Acute on chronic. Admitted with wound care to area.   Plan:  -Encourage repositioning  -Monitor for worsening s/symptoms of concerns  -Start calmoseptine to area BID.    (E11.21) Type 2 diabetes mellitus with diabetic nephropathy, without long-term current use of insulin (H)  Comment: Chronic. Last A1c in Jan 2024 was 8.5%. GOal <9%.   Plan:   -HOLD Weekly ozempic while on TCU  -Monitor for worsening s/symptoms of concerns  -Continue Empaglifozin 25mg daily  -Continue glipizide 10mg daily  -Continue metformin 500mg daily with breakfast  -Monitor Blood Glucose BID. Update if Blood Glucose <70 and/or >450  -CMP, CBC, vitamin D, vitamin B12, magnesium, and hgb A1c due 4/3/25    (J44.9) Chronic obstructive  pulmonary disease, unspecified COPD type (H)  (J45.20) Mild intermittent asthma without complication  Comment: Chronic. Stable.   Plan:   -Monitor respiratory status  -Monitor for worsening s/symptoms of concerns  -CMP, CBC, vitamin D, vitamin B12, magnesium, and hgb A1c due 4/3/25    (N32.81) OAB  Comment: Chronic.   Plan:  -Monitor urinary status  -Continue mirabegron 50mg daily  -Continue solenacin succinate 5mg daily  -Follow up with urology post TCU  -CMP, CBC, vitamin D, vitamin B12, magnesium, and hgb A1c due 4/3/25    49 minutes spent on the date of the encounter doing chart review, history and exam, PMH, documentation and further activities as noted above. Collaboration with nursing staff on site, clinical managers, and therapies were completed on site today.     Electronically signed by:  Dr. Angeli Garcia DNP, APRN, FNP-C, WCS-C, EDS-C    Provider reviewed records from facility, and interpreted most recent imaging/lab work, and vital signs.   Acute and chronic conditions managed by writer. Have been reviewed during today's exam.

## 2025-04-01 NOTE — PLAN OF CARE
Problem: Adult Inpatient Plan of Care  Goal: Absence of Hospital-Acquired Illness or Injury  Intervention: Identify and Manage Fall Risk  Recent Flowsheet Documentation  Taken 3/31/2025 2022 by Symone Mak RN  Safety Promotion/Fall Prevention:   activity supervised   clutter free environment maintained   nonskid shoes/slippers when out of bed   room door open   room near nurse's station   room organization consistent   safety round/check completed     Problem: Delirium  Goal: Optimal Coping  Intervention: Optimize Psychosocial Adjustment to Delirium  Recent Flowsheet Documentation  Taken 3/31/2025 2022 by Symone Mak RN  Family/Support System Care: self-care encouraged     Problem: Adult Inpatient Plan of Care  Goal: Absence of Hospital-Acquired Illness or Injury  Intervention: Prevent Skin Injury  Recent Flowsheet Documentation  Taken 3/31/2025 2031 by Symone Mak RN  Body Position: supine, head elevated  Taken 3/31/2025 2022 by Symone Mak RN  Body Position: supine, head elevated  Skin Protection: adhesive use limited   Goal Outcome Evaluation:    PT A&O x4; makes needs known; On RA; Very [place] even with hearing aids in, I used a white board to do communication b/c her hearing aids ; [place] in place; pt has abd distention with tenderness due to a week of no BMs; pt had 2 very large BM on shift; Pt was anxious before BM and seemed to calm down after BM; Call light within reach, bed alarm on, and bed in lowest position    Symone Mak RN on 2025 at 6:40 AM

## 2025-04-01 NOTE — PROGRESS NOTES
Physical Therapy Discharge Summary    Reason for therapy discharge:    Discharged to transitional care facility.    Progress towards therapy goal(s). See goals on Care Plan in Psychiatric electronic health record for goal details.  Goals not met.  Barriers to achieving goals:   discharge from facility.    Therapy recommendation(s):    Continued therapy is recommended.  Rationale/Recommendations:  TCU.

## 2025-04-01 NOTE — DISCHARGE SUMMARY
[facility] MEDICINE  DISCHARGE SUMMARY     Primary Care Physician: Terra Mathews  Admission Date: 3/24/2025   Discharge Provider: Yolanda Alaniz MD Discharge Date: 4/1/2025   Diet:   Active Diet and Nourishment Order   Procedures    Room Service    Diet    Moderate Consistent Carb (60 g CHO per Meal) Diet       Code Status: No CPR- Do NOT Intubate   Activity: DCACTIVITY: Activity as tolerated        Condition at Discharge: Stable     REASON FOR PRESENTATION(See Admission Note for Details)     Mechanical fall    PRINCIPAL & ACTIVE DISCHARGE DIAGNOSES        Lumbar stenosis, severe without cauda equina   Mechanical fall due to number 1  Menieres disease with hearing loss   Steroid dependence due to number 3  Constipation, slow transit   Mood disorder (bipolar, anxiety)  DM2, noninsulin  Dyslipidemia  Urge incontinence  Deafness  HTN, essential  RA, seropositive of hands    PENDING LABS     Unresulted Labs Ordered in the Past 30 Days of this Admission       No orders found from 2/22/2025 to 3/25/2025.              RECOMMENDATIONS TO OUTPATIENT PROVIDER FOR F/U VISIT     Follow-up Appointments       Follow Up      We have arranged outpatient follow-up appointment on April 1 at 2:40 PM with Dr. Blake in neurosurgery clinic to discuss surgical intervention.        Follow Up and recommended labs and tests      Follow up with prison physician.  The following labs/tests are recommended:  - ensure regular bowel regimen  - outpatient neurosurgery followup regarding management of lumbar spinal stenosis                    DISPOSITION     Discharge to TCU    SUMMARY OF HOSPITAL COURSE:      84 year old female into [facility] on 3/24/2025 after presenting to the hospital for a mechanical fall.  Pt has a known history of lumbar spinal stenosis.    PMHx includes deafness, menieres, steroid dependence due to menieres, DM2 noninsulin, HTN, mood issues, lumbar stenosis,   Slow transit  constipation, dyslipidemia, urge incontinence    Diagnostics on arrival to the ER included an MRI showing severe lumbar stenosis at L2-3 along with variable amounts of stenosis throughout the lumbar spine.  She had no signs of infection.    Hospital course is marked by NS consultation along with discussions regarding operative intervention.  After multiple discussions with the NS team it was decided she would discharge to a local TCU with plans to have close follow up and schedule lumbar surgery during the week of 4/7.  She had issues with constipation due to her polypharmacy including narcotic and iron use.  She required a full miralax prep followed by gastrogaffin contrast in order to have her BM.  I do recommend at a minimum she take miralax twice daily in the future to prevent her severe  Constipation. She was seen by the PT team who recommended discharge to a local TCU due to her need for a 2 person transfer.     At the time of discharge she is stable, understanding the plan. I spoke with her son Suleiman twice regarding her discharge.  All questions were answered.      Discharge Medications with Med changes:     Current Discharge Medication List        CONTINUE these medications which have CHANGED    Details   acetaminophen (TYLENOL) 500 MG tablet Take 2 tablets (1,000 mg) by mouth every 6 hours as needed for mild pain.    Associated Diagnoses: Spinal stenosis of lumbar region, unspecified whether neurogenic claudication present           CONTINUE these medications which have NOT CHANGED    Details   busPIRone (BUSPAR) 7.5 MG tablet Take 7.5 mg by mouth 2 times daily      calamine 8-8 % lotion Apply topically every hour as needed for itching.      diazepam (VALIUM) 1 MG/ML solution Take 0.5 mg by mouth 2 times daily as needed for anxiety (take 0.5 mg (0.5 mL) by mouth up to twice daily as needed -- do not take the same day as your diazepam tablet).      diazepam (VALIUM) 2 MG tablet Take 2 mg by mouth 2 times  daily as needed for anxiety (take one tablet (2 mg) by mouth up to twice daily as needed -- do not take on the same day as your diazepam liquid solution).      DULoxetine (CYMBALTA) 60 MG capsule Take 60 mg by mouth every morning      empagliflozin (JARDIANCE) 25 MG TABS tablet Take 25 mg by mouth daily.      ferrous sulfate (FEROSUL) 325 (65 Fe) MG tablet Take 325 mg by mouth 2 times daily.      glipiZIDE (GLUCOTROL XL) 10 MG 24 hr tablet Take 10 mg by mouth daily.      hydrOXYzine (ATARAX) 25 MG tablet Take 25 mg by mouth 2 times daily.      lamoTRIgine (LAMICTAL) 100 MG tablet Take 100 mg by mouth 2 times daily      metFORMIN (GLUCOPHAGE XR) 500 MG 24 hr tablet Take 500 mg by mouth daily (with breakfast)      methocarbamol (ROBAXIN) 500 MG tablet Take 500 mg by mouth 3 times daily as needed.      miconazole (MICATIN) 2 % external powder Apply topically 2 times daily as needed for other (candidiasis/intertrigo) To skin folds    Associated Diagnoses: Groin rash      mirabegron (MYRBETRIQ) 50 MG 24 hr tablet Take 1 tablet (50 mg) by mouth daily.  Qty: 90 tablet, Refills: 2    Associated Diagnoses: Urge incontinence      omeprazole (PRILOSEC) 20 MG DR capsule Take 20 mg by mouth daily      polyethylene glycol (MIRALAX) 17 GM/Dose powder Take 1 Capful by mouth daily as needed for constipation.      !! predniSONE (DELTASONE) 1 MG tablet Take 1 mg by mouth every morning (In addition to the 5 mg dose; 1 mg + 5 mg = 6 mg)      !! predniSONE (DELTASONE) 5 MG tablet Take 5 mg by mouth every morning (In addition to the 1 mg dose; 5 mg + 1 mg = 6 mg)      pregabalin (LYRICA) 100 MG capsule Take 100 mg by mouth 2 times daily      rosuvastatin (CRESTOR) 10 MG tablet Take 1 tablet by mouth daily.      Semaglutide, 2 MG/DOSE, (OZEMPIC, 2 MG/DOSE,) 8 MG/3ML pen Inject 2 mg subcutaneously every 7 days. On Thursday      senna-docusate (SENOKOT-S/PERICOLACE) 8.6-50 MG tablet Take 1 tablet by mouth 2 times daily as needed for  constipation.      solifenacin (VESICARE) 5 MG tablet Take 5 mg by mouth daily.      triamcinolone (KENALOG) 0.1 % external ointment Apply topically 2 times daily as needed for irritation.      triamterene-HCTZ (DYAZIDE) 37.5-25 MG capsule Take 1 capsule by mouth every morning Hold for SBP<110      trospium (SANCTURA) 20 MG tablet Take 1 tablet (20 mg) by mouth 2 times daily (before meals).  Qty: 180 tablet, Refills: 3    Associated Diagnoses: Mixed incontinence      blood glucose (ACCU-CHEK FASTCLIX) lancing device FOR TESTING ONCE DAILY. DX  E11.9 TYPE 2 DIABETES      !! blood glucose (CONTOUR TEST) test strip TESTING EVERY DAY DX  E11.9      !! CONTOUR NEXT TEST test strip TESTING EVERY DAY DX  E11.9       !! - Potential duplicate medications found. Please discuss with provider.        STOP taking these medications       oxyCODONE-acetaminophen (PERCOCET) 5-325 MG tablet Comments:   Reason for Stopping:                     Consults       NEUROSURGERY IP CONSULT  PHYSICAL THERAPY ADULT IP CONSULT  OCCUPATIONAL THERAPY ADULT IP CONSULT  NEUROSURGERY IP CONSULT  WOUND OSTOMY CONTINENCE NURSE  IP CONSULT  PHYSICAL THERAPY ADULT IP CONSULT  OCCUPATIONAL THERAPY ADULT IP CONSULT  SPEECH LANGUAGE PATH ADULT IP CONSULT  GASTROENTEROLOGY IP CONSULT    Immunizations given this encounter     Most Recent Immunizations   Administered Date(s) Administered    COVID-19 MONOVALENT 12+ (Pfizer) 12/22/2021    Influenza (H1N1) 12/31/2009    Influenza (High Dose) Trivalent,PF (Fluzone) 01/15/2025    Influenza (IIV3) PF 10/08/2012    Influenza (prior to 2024) 09/20/2016    Influenza Vaccine (Flucelvax Quadrivalent) 10/05/2021    Influenza Vaccine 65+ (Fluzone HD) 10/05/2023    Pneumo Conj 13-V (2010&after) 02/18/2016    Pneumococcal 20 valent Conjugate (Prevnar 20) 02/12/2024    Pneumococcal 23 valent 10/02/2014    TDAP Vaccine (Adacel) 07/18/2013           Anticoagulation Information      Recent INR results: No results for input(s):  "\"INR\" in the last 168 hours.  Warfarin doses (if applicable) or name of other anticoagulant:       SIGNIFICANT IMAGING FINDINGS     Results for orders placed or performed during the hospital encounter of 03/24/25   MR Lumbar Spine w/o & w Contrast    Impression    IMPRESSION:  1.  At L2-L3, severe spinal canal stenosis and mild bilateral neural foraminal stenosis.  2.  At L3-L4, moderate to severe spinal canal stenosis with asymmetric narrowing of the right lateral recess. Mild to moderate right and mild left neural foraminal stenosis.  3.  At L4-L5, moderate to severe spinal canal stenosis with mild right and mild to moderate left neural foraminal stenosis.  4.  At L5-S1, mild spinal canal stenosis with asymmetric right lateral recess stenosis. Moderate right and severe left neural foraminal stenosis with impingement upon the left L5 nerve root.     CT Lumbar Spine w/o Contrast    Impression    IMPRESSION:  CERVICAL SPINE CT:  1.  No acute fracture or posttraumatic subluxation.  2.  No high-grade spinal canal stenosis. Severe neural foraminal stenosis on the right at C5-C6.    THORACIC SPINE CT:  1.  No acute fracture or posttraumatic subluxation.  2.  No high-grade spinal canal or neural foraminal stenosis.    LUMBAR SPINE CT:  1.  No acute fracture or posttraumatic subluxation.  2.  Moderate spinal canal stenosis at L2-L3, L3-L4, and L4-L5.  3.  Severe neural foraminal stenosis on the left at L5-S1.     CT Thoracic Spine w/o Contrast    Impression    IMPRESSION:  CERVICAL SPINE CT:  1.  No acute fracture or posttraumatic subluxation.  2.  No high-grade spinal canal stenosis. Severe neural foraminal stenosis on the right at C5-C6.    THORACIC SPINE CT:  1.  No acute fracture or posttraumatic subluxation.  2.  No high-grade spinal canal or neural foraminal stenosis.    LUMBAR SPINE CT:  1.  No acute fracture or posttraumatic subluxation.  2.  Moderate spinal canal stenosis at L2-L3, L3-L4, and L4-L5.  3.  Severe " neural foraminal stenosis on the left at L5-S1.     CT Cervical Spine w/o Contrast    Impression    IMPRESSION:  CERVICAL SPINE CT:  1.  No acute fracture or posttraumatic subluxation.  2.  No high-grade spinal canal stenosis. Severe neural foraminal stenosis on the right at C5-C6.    THORACIC SPINE CT:  1.  No acute fracture or posttraumatic subluxation.  2.  No high-grade spinal canal or neural foraminal stenosis.    LUMBAR SPINE CT:  1.  No acute fracture or posttraumatic subluxation.  2.  Moderate spinal canal stenosis at L2-L3, L3-L4, and L4-L5.  3.  Severe neural foraminal stenosis on the left at L5-S1.     XR Lumbar Flex/Ext 2/3 Views    Impression    IMPRESSION: No fracture is identified. Multilevel mild to moderate degenerative disc disease. Severe lower facet arthropathy. Grade I anterolisthesis of L4 on L5 and L5 on S1, similar in flexion versus extension.   XR Humerus Port Right G/E 2 Views    Impression    IMPRESSION: No definite fracture identified in the right shoulder or right humerus. No traumatic malalignment. Mild degenerative arthritis of the glenohumeral and acromioclavicular joints. Small calcification adjacent to the greater tuberosity consistent   with a small focus of hydroxyapatite deposition/calcific tendinopathy. The bones are demineralized.   XR Shoulder Right Port G/E 2 Views    Impression    IMPRESSION: No definite fracture identified in the right shoulder or right humerus. No traumatic malalignment. Mild degenerative arthritis of the glenohumeral and acromioclavicular joints. Small calcification adjacent to the greater tuberosity consistent   with a small focus of hydroxyapatite deposition/calcific tendinopathy. The bones are demineralized.   XR Elbow Port Right 2 Views    Impression    IMPRESSION: Normal joint spaces and alignment. No fracture or joint effusion. Small foci of heterotopic ossification along the lateral aspect of the joint space. Additional soft tissue calcifications  along the dorsal aspect of the forearm.   XR Abdomen Port 1 View    Impression    IMPRESSION:     Bowel gas pattern is normal. Nothing for obstruction or free air stool is present in the ascending and descending colon, however no colonic distention. Minimal gas in the sigmoid colon and rectum. No evidence for renal stones. There is focal   calcification in the central pelvis slightly to the right of the coccyx consistent with a degenerated uterine fibroid.   CT Abdomen Pelvis w/o Contrast    Impression    IMPRESSION:   1.  Moderate colonic stool burden. Some of the stool burden is mixed density proximally which may indicate impending diarrheal state.  2.  Hepatic steatosis.  3.  Diverticulosis.         Discharge Orders        Follow Up    We have arranged outpatient follow-up appointment on April 1 at 2:40 PM with Dr. Blake in neurosurgery clinic to discuss surgical intervention.     General info for SNF    Length of Stay Estimate: Short Term Care: Estimated # of Days unknown  Condition at Discharge: Stable  Level of care:skilled   Rehabilitation Potential: Fair  Admission H&P remains valid and up-to-date: Yes  Recent Chemotherapy: N/A  Use Nursing Home Standing Orders: Yes     Mantoux instructions    Give two-step Mantoux (PPD) Per Facility Policy Yes     Follow Up and recommended labs and tests    Follow up with long-term physician.  The following labs/tests are recommended:  - ensure regular bowel regimen  - outpatient neurosurgery followup regarding management of lumbar spinal stenosis     Reason for your hospital stay    The patient was admitted with lower extremity pain and weakness due to worsening lumbar spinal stenosis.  She was evaluated by neurosurgery, who determined that no inpatient surgical intervention was indicated.  The patient had medical management with pain control.  She was discharged to TCU.     Activity - Up with nursing assistance     Physical Therapy Adult Consult    Evaluate and treat  as clinically indicated.    Reason:  lumbar spinal stenosis     Occupational Therapy Adult Consult    Evaluate and treat as clinically indicated.    Reason:  lumbar spinal stenosis     Fall precautions     Diet    Follow this diet upon discharge: Current Diet:  diabetic diet       Examination   Physical Exam   Temp:  [97.6  F (36.4  C)-98  F (36.7  C)] 97.6  F (36.4  C)  Pulse:  [71-86] 71  Resp:  [20] 20  BP: (124-135)/(58-72) 124/58  SpO2:  [89 %-92 %] 90 %  Wt Readings from Last 1 Encounters:   04/01/25 99.2 kg (218 lb 11.1 oz)       GENERAL:  Alert, appears comfortable, in no acute distress, appears stated age   HEAD:  Normocephalic, without obvious abnormality, atraumatic   EYES:  PERRL, conjunctiva/corneas clear, no scleral icterus, EOM's intact   NOSE: Nares normal, septum midline, mucosa normal, no drainage   THROAT: Lips, mucosa, and tongue normal; teeth and gums normal, mouth moist   NECK: Supple, symmetrical, trachea midline   BACK:   Symmetric, no curvature, ROM normal   LUNGS:   Clear to auscultation bilaterally, no rales, rhonchi, or wheezing, symmetric chest rise on inhalation, respirations unlabored   CHEST WALL:  No tenderness or deformity   HEART:  Regular rate and rhythm, S1 and S2 normal, no murmur, rub, or gallop    ABDOMEN:   Soft, non-tender, bowel sounds active all four quadrants, no masses, no organomegaly, no rebound or guarding   EXTREMITIES: Extremities normal, atraumatic, no cyanosis or edema    SKIN: Dry to touch, no exanthems in the visualized areas   NEURO: Alert, oriented x 4, moves all four extremities freely/spontaneously   PSYCH: Cooperative, behavior is appropriate           Please see EMR for more detailed significant labs, imaging, consultant notes etc.    IYolanda MD, personally saw the patient today and spent less than or equal to 30 minutes discharging this patient.    Yolanda Alaniz MD  [facility]    CC:Terra Mathews

## 2025-04-02 ENCOUNTER — TRANSITIONAL CARE UNIT VISIT (OUTPATIENT)
Dept: GERIATRICS | Facility: CLINIC | Age: 85
End: 2025-04-02
Payer: MEDICARE

## 2025-04-02 ENCOUNTER — LAB REQUISITION (OUTPATIENT)
Dept: LAB | Facility: CLINIC | Age: 85
End: 2025-04-02
Payer: MEDICARE

## 2025-04-02 VITALS
OXYGEN SATURATION: 92 % | HEIGHT: 66 IN | WEIGHT: 210.6 LBS | DIASTOLIC BLOOD PRESSURE: 71 MMHG | BODY MASS INDEX: 33.85 KG/M2 | RESPIRATION RATE: 17 BRPM | TEMPERATURE: 97.5 F | HEART RATE: 74 BPM | SYSTOLIC BLOOD PRESSURE: 152 MMHG

## 2025-04-02 DIAGNOSIS — N32.81 OAB (OVERACTIVE BLADDER): ICD-10-CM

## 2025-04-02 DIAGNOSIS — D64.9 CHRONIC ANEMIA: ICD-10-CM

## 2025-04-02 DIAGNOSIS — J45.20 MILD INTERMITTENT ASTHMA WITHOUT COMPLICATION: ICD-10-CM

## 2025-04-02 DIAGNOSIS — E66.01 CLASS 2 SEVERE OBESITY WITH BODY MASS INDEX (BMI) OF 35 TO 39.9 WITH SERIOUS COMORBIDITY (H): ICD-10-CM

## 2025-04-02 DIAGNOSIS — M62.81 GENERALIZED MUSCLE WEAKNESS: ICD-10-CM

## 2025-04-02 DIAGNOSIS — M48.00 SPINAL STENOSIS, UNSPECIFIED SPINAL REGION: ICD-10-CM

## 2025-04-02 DIAGNOSIS — N18.31 STAGE 3A CHRONIC KIDNEY DISEASE (H): ICD-10-CM

## 2025-04-02 DIAGNOSIS — E46 UNSPECIFIED PROTEIN-CALORIE MALNUTRITION: ICD-10-CM

## 2025-04-02 DIAGNOSIS — L85.3 DRY SKIN: ICD-10-CM

## 2025-04-02 DIAGNOSIS — G89.4 CHRONIC PAIN SYNDROME: ICD-10-CM

## 2025-04-02 DIAGNOSIS — E66.812 CLASS 2 SEVERE OBESITY WITH BODY MASS INDEX (BMI) OF 35 TO 39.9 WITH SERIOUS COMORBIDITY (H): ICD-10-CM

## 2025-04-02 DIAGNOSIS — Z91.81 PERSONAL HISTORY OF FALL: ICD-10-CM

## 2025-04-02 DIAGNOSIS — E78.5 DYSLIPIDEMIA: ICD-10-CM

## 2025-04-02 DIAGNOSIS — H81.09 MENIERE'S DISEASE, UNSPECIFIED LATERALITY: ICD-10-CM

## 2025-04-02 DIAGNOSIS — N18.30 CHRONIC KIDNEY DISEASE, STAGE 3 UNSPECIFIED (H): ICD-10-CM

## 2025-04-02 DIAGNOSIS — I89.0 LYMPHEDEMA: ICD-10-CM

## 2025-04-02 DIAGNOSIS — F32.A DEPRESSION, UNSPECIFIED DEPRESSION TYPE: ICD-10-CM

## 2025-04-02 DIAGNOSIS — F31.9 BIPOLAR 1 DISORDER (H): ICD-10-CM

## 2025-04-02 DIAGNOSIS — I10 ESSENTIAL (PRIMARY) HYPERTENSION: ICD-10-CM

## 2025-04-02 DIAGNOSIS — J44.9 CHRONIC OBSTRUCTIVE PULMONARY DISEASE, UNSPECIFIED COPD TYPE (H): ICD-10-CM

## 2025-04-02 DIAGNOSIS — D64.9 ANEMIA, UNSPECIFIED: ICD-10-CM

## 2025-04-02 DIAGNOSIS — R53.81 PHYSICAL DECONDITIONING: Primary | ICD-10-CM

## 2025-04-02 DIAGNOSIS — F41.9 ANXIETY: ICD-10-CM

## 2025-04-02 DIAGNOSIS — L30.9 DERMATITIS: ICD-10-CM

## 2025-04-02 DIAGNOSIS — E11.21 TYPE 2 DIABETES MELLITUS WITH DIABETIC NEPHROPATHY, WITHOUT LONG-TERM CURRENT USE OF INSULIN (H): ICD-10-CM

## 2025-04-02 DIAGNOSIS — E11.9 TYPE 2 DIABETES MELLITUS WITHOUT COMPLICATIONS (H): ICD-10-CM

## 2025-04-02 DIAGNOSIS — E55.9 VITAMIN D DEFICIENCY, UNSPECIFIED: ICD-10-CM

## 2025-04-02 DIAGNOSIS — G83.4 CAUDA EQUINA COMPRESSION (H): ICD-10-CM

## 2025-04-02 DIAGNOSIS — I10 ESSENTIAL HYPERTENSION: ICD-10-CM

## 2025-04-02 PROCEDURE — 86481 TB AG RESPONSE T-CELL SUSP: CPT | Mod: ORL | Performed by: NURSE PRACTITIONER

## 2025-04-02 PROCEDURE — 99310 SBSQ NF CARE HIGH MDM 45: CPT | Performed by: NURSE PRACTITIONER

## 2025-04-02 PROCEDURE — 36415 COLL VENOUS BLD VENIPUNCTURE: CPT | Mod: ORL | Performed by: NURSE PRACTITIONER

## 2025-04-02 RX ORDER — EMOLLIENT BASE
CREAM (GRAM) TOPICAL
Qty: 113 G | Refills: 0 | Status: SHIPPED | OUTPATIENT
Start: 2025-04-02

## 2025-04-02 RX ORDER — ANORECTAL OINTMENT 15.7; .44; 24; 20.6 G/100G; G/100G; G/100G; G/100G
OINTMENT TOPICAL
Qty: 113 G | Refills: 3 | Status: SHIPPED | OUTPATIENT
Start: 2025-04-02

## 2025-04-02 RX ORDER — OXYCODONE HYDROCHLORIDE 5 MG/1
5 TABLET ORAL EVERY 4 HOURS PRN
Qty: 30 TABLET | Refills: 0 | Status: SHIPPED | OUTPATIENT
Start: 2025-04-02

## 2025-04-02 NOTE — LETTER
4/2/2025      Jumana Yang  [address]  [place] [address]        [facility] GERIATRICS    PRIMARY CARE PROVIDER AND CLINIC:  JUAN ANTONIO John CNP, [facility] [address] [facility] / [place] [address]  Chief Complaint   Patient presents with     Hospital F/U      [place] Medical Record Number:  [id number]  Place of Service where encounter took place:  [facility] (TCU)(SNF) [[id number]]    Jumana Yang  is a 84 year old  (1940), admitted to the above facility from  [facility]. Hospital stay 3/24/25 through 4/1/25..   HPI:    Jumana Yang is a 84 year old female presenting for leg pain and weakness following a fall.  She is clinically stable.  She has a known history of lumbar spinal stenosis as well as previously demonstrated L5 nerve impingement.  By her report, she has had a chronic worsening of her functional status over time.  The lower extremity weakness today appeared to precede her fall.  There appears to have been some progression of lumbar spinal canal stenosis compared to the MRI from 9/25/24.  Given these findings, neurosurgery consult ordered.  Plan for pain control in the meantime.     The primary encounter diagnosis was Physical deconditioning. Diagnoses of Generalized muscle weakness, Personal history of fall, Chronic pain syndrome, Cauda equina compression (H), Spinal stenosis, unspecified spinal region, Essential hypertension, Class 2 severe obesity with body mass index (BMI) of 35 to 39.9 with serious comorbidity (H), Dyslipidemia, Lymphedema, Stage 3a chronic kidney disease (H), Anxiety, Depression, unspecified depression type, Bipolar 1 disorder (H), Chronic anemia, Type 2 diabetes mellitus with diabetic nephropathy, without long-term current use of insulin (H), Chronic obstructive pulmonary disease, unspecified COPD type (H), Mild intermittent asthma without complication, Meniere's disease, unspecified  laterality, OAB (overactive bladder), Dermatitis, and Dry skin were also pertinent to this visit.    Met with patient who is very [place]. Prefers white board for communication. Able to read some lips very slowly. She denies any chest pain, palpitations, shortness of breath, LOPEZ, lightheadedness, dizziness, or cough. Denies any abdominal discomfort. Denies N&V. Denies B&B concerns. Denies dysuria or frequency. Denies loose or constipation now. Reports needing to received increased laxative during her hospitalization which helped but now stools are a bit on softer side. Appetite good. Sleeping well. Ongoing pain complaints to back area. Reports pain stable with current regimen.     BP Readings from Last 3 Encounters:   04/02/25 (!) 152/71   04/01/25 (!) 143/65   02/11/25 139/79     Wt Readings from Last 5 Encounters:   04/02/25 95.5 kg (210 lb 9.6 oz)   04/01/25 99.2 kg (218 lb 11.1 oz)   05/28/24 80.7 kg (178 lb)   02/14/24 93.4 kg (206 lb)   02/08/24 93.7 kg (206 lb 9.1 oz)     CODE STATUS/ADVANCE DIRECTIVES DISCUSSION:  Prior  DNR / DNI  ALLERGIES:   Allergies   Allergen Reactions     Propofol Nausea and Vomiting     Shellfish Allergy      Other reaction(s): Dizziness     Capsaicin Rash and Blisters     Capsaicin Blisters and Rash     Tegaderm Transparent Dressing (Informational Only) Itching and Rash      PAST MEDICAL HISTORY:   Past Medical History:   Diagnosis Date     Abdominal pain      Anxiety      Arthritis      Asthma      Bipolar 1 disorder (H)      Chronic pain      Cochlear hydrops 01/01/1988    steriods and diazide     COPD (chronic obstructive pulmonary disease) (H)      Depression      Diabetes mellitus (H)      Dyspepsia      GERD (gastroesophageal reflux disease)      Hard of hearing     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     Hepatic abscess      Hyperlipidemia      Hypertension      Hypothyroidism      Irritable bowel syndrome      Meniere disease      Neuropathy      Noninfectious ileitis       Peritoneal abscess (H)      Spinal stenosis of lumbar region      Steroid long-term use      Vaginitis, atrophic, postmenopausal      Vitamin D deficiency       PAST SURGICAL HISTORY:   has a past surgical history that includes Rectal surgery; Foot surgery; picc insertion (8/28/2013); GI surgery; orthopedic surgery; vascular surgery; Laparoscopic hepatectomy partial (11/4/2013); cataract iol, rt/lt (Left, 7/2013); IR Lumbar Sacral Transfor Injection Bilateral (Bilateral, 3/11/2022); and Release carpal tunnel (Left, 3/21/2023).  FAMILY HISTORY: family history includes Cerebrovascular Disease in her mother; Heart Disease in her brother, father, and mother.  SOCIAL HISTORY:   reports that she quit smoking about 37 years ago. Her smoking use included cigarettes. She has quit using smokeless tobacco. She reports that she does not currently use alcohol. She reports that she does not use drugs.  Patient's living condition: lives alone    Post Discharge Medication Reconciliation Status:   MED REC REQUIRED  Post Medication Reconciliation Status:  Discharge medications reconciled and changed, see notes/orders       Current Outpatient Medications   Medication Sig Dispense Refill     emollient (VANICREAM) external cream Apply to bilateral lower extremities daily 113 g 0     menthol-zinc oxide (CALMOSEPTINE) 0.44-20.6 % OINT ointment Apply to buttock  g 3     acetaminophen (TYLENOL) 500 MG tablet Take 2 tablets (1,000 mg) by mouth every 6 hours as needed for mild pain.       blood glucose (ACCU-CHEK FASTCLIX) lancing device FOR TESTING ONCE DAILY. DX  E11.9 TYPE 2 DIABETES       blood glucose (CONTOUR TEST) test strip TESTING EVERY DAY DX  E11.9       busPIRone (BUSPAR) 7.5 MG tablet Take 7.5 mg by mouth 2 times daily       calamine 8-8 % lotion Apply topically every hour as needed for itching.       CONTOUR NEXT TEST test strip TESTING EVERY DAY DX  E11.9       diazepam (VALIUM) 2 MG tablet Take 1 tablet (2 mg) by  mouth 2 times daily as needed for anxiety (take one tablet (2 mg) by mouth up to twice daily as needed -- do not take on the same day as your diazepam liquid solution). 28 tablet 0     DULoxetine (CYMBALTA) 60 MG capsule Take 60 mg by mouth every morning       empagliflozin (JARDIANCE) 25 MG TABS tablet Take 25 mg by mouth daily.       ferrous sulfate (FEROSUL) 325 (65 Fe) MG tablet Take 1 tablet (325 mg) by mouth every other day.       glipiZIDE (GLUCOTROL XL) 10 MG 24 hr tablet Take 10 mg by mouth daily.       hydrOXYzine (ATARAX) 25 MG tablet Take 25 mg by mouth 2 times daily.       lamoTRIgine (LAMICTAL) 100 MG tablet Take 100 mg by mouth 2 times daily       metFORMIN (GLUCOPHAGE XR) 500 MG 24 hr tablet Take 500 mg by mouth daily (with breakfast)       methocarbamol (ROBAXIN) 500 MG tablet Take 500 mg by mouth 3 times daily as needed.       miconazole (MICATIN) 2 % external powder Apply topically 2 times daily as needed for other (candidiasis/intertrigo) To skin folds       mirabegron (MYRBETRIQ) 50 MG 24 hr tablet Take 1 tablet (50 mg) by mouth daily. 90 tablet 2     omeprazole (PRILOSEC) 20 MG DR capsule Take 20 mg by mouth daily       oxyCODONE (ROXICODONE) 5 MG tablet Take 1 tablet (5 mg) by mouth every 4 hours as needed for pain. 60 tablet 0     polyethylene glycol (MIRALAX) 17 GM/Dose powder Take 1 Capful by mouth daily as needed for constipation.       predniSONE (DELTASONE) 1 MG tablet Take 1 mg by mouth every morning (In addition to the 5 mg dose; 1 mg + 5 mg = 6 mg)       predniSONE (DELTASONE) 5 MG tablet Take 5 mg by mouth every morning (In addition to the 1 mg dose; 5 mg + 1 mg = 6 mg)       pregabalin (LYRICA) 100 MG capsule Take 1 capsule (100 mg) by mouth 3 times daily. 90 capsule 0     rosuvastatin (CRESTOR) 10 MG tablet Take 1 tablet by mouth daily.       Semaglutide, 2 MG/DOSE, (OZEMPIC, 2 MG/DOSE,) 8 MG/3ML pen Inject 2 mg subcutaneously every 7 days. On Thursday       senna-docusate  "(SENOKOT-S/PERICOLACE) 8.6-50 MG tablet Take 1 tablet by mouth 2 times daily as needed for constipation.       solifenacin (VESICARE) 5 MG tablet Take 5 mg by mouth daily.       triamcinolone (KENALOG) 0.1 % external ointment Apply topically 2 times daily as needed for irritation.       triamterene-HCTZ (DYAZIDE) 37.5-25 MG capsule Take 1 capsule by mouth every morning Hold for SBP<110       trospium (SANCTURA) 20 MG tablet Take 1 tablet (20 mg) by mouth 2 times daily (before meals). 180 tablet 3     No current facility-administered medications for this visit.       ROS:  10 point ROS of systems including Constitutional, Eyes, Respiratory, Cardiovascular, Gastroenterology, Genitourinary, Integumentary, Musculoskeletal, Psychiatric were all negative except for pertinent positives noted in my HPI.    Vitals:  BP (!) 152/71   Pulse 74   Temp 97.5  F (36.4  C)   Resp 17   Ht 1.676 m (5' 6\")   Wt 95.5 kg (210 lb 9.6 oz)   LMP  (LMP Unknown)   SpO2 92%   BMI 33.99 kg/m    Exam:  GENERAL APPEARANCE:  Alert, in no distress, oriented, cooperative  ENT:  Mouth and posterior oropharynx normal, moist mucous membranes, [place]  EYES:  EOM, conjunctivae, lids, pupils and irises normal  RESP:  respiratory effort and palpation of chest normal, lungs clear to auscultation , no respiratory distress  CV:  regular rate and rhythm, no murmur, rub, or gallop, peripheral edema 1-2+ in BLE  ABDOMEN:  normal bowel sounds, soft, nontender, no hepatosplenomegaly or other masses, no guarding or rebound  M/S:   Maximum assistance for all ADLs, transfers and cares. Wheelchair bound.   SKIN:  Inspection of skin and subcutaneous tissue baseline, dry skin noted to bilateral lower extremities. See photos of sacral area  NEURO:   Cranial nerves 2-12 are normal tested and grossly at patient's baseline, no purposeful movement in upper and lower extremities  PSYCH:  oriented X 3, memory impaired , affect and mood normal        Lab/Diagnostic " data:  Labs done in SNF are in [facility]. Please refer to them using EPIC/Care Everywhere. and Recent labs in EPIC reviewed by me today.     ASSESSMENT/PLAN:    (R53.81) Physical deconditioning  (primary encounter diagnosis)  (M62.81) Generalized muscle weakness  Comment: Acute on chronic. S/T below diagnosis  Plan:   -Continue Physical therapy and Occupational therapy as directed  -SW to remain involved for safe discharge planning needs    (Z91.81) Personal history of fall  (G89.4) Chronic pain syndrome  (G83.4) Cauda equina compression (H)  (M48.00) Spinal stenosis, unspecified spinal region  Comment: Acute on chronic. Per hospital records-presenting for leg pain and weakness following a fall. She is clinically stable. She has a known history of lumbar spinal stenosis as well as previously demonstrated L5 nerve impingement. By her report, she has had a chronic worsening of her functional status over time. The lower extremity weakness today appeared to precede her fall. There appears to have been some progression of lumbar spinal canal stenosis compared to the MRI from 9/25/24.   Plan:   -Monitor pain complaints as directed  -Continue lyrica 100mg TID (max dose for CrCl)  -Continue Tylenol 1000mg every 6H PRN  -Continue robaxin 500 TID PRN  -Restarted oxycodone 5mg every 4 hours PRN  -CMP, CBC, vitamin D, vitamin B12, magnesium, and hgb A1c due 4/3/25    (I10) Essential hypertension  (E66.812,  E66.01) Class 2 severe obesity with body mass index (BMI) of 35 to 39.9 with serious comorbidity (H)  (E78.5) Dyslipidemia  (I89.0) Lymphedema  (N18.31) Stage 3a chronic kidney disease (H)  Comment: Chronic. Based on JNC-8 goals,  patients age of 84 year old, presence of diabetes or CKD, and goals of care goal BP is  <140/90 mm Hg. Patient is stable with current plan of care and routine assessment..  Plan:   -Monitor BP and HR  -Continue rosuvastatin 10mg daily  -start TG compression stockings to bilateral lower  extremities daily. On in Am and off at HS  -Continue triamterene-hydrochlorothiazide 37.5-25mg daily.. HOLD if SBP<100  -CMP, CBC, vitamin D, vitamin B12, magnesium, and hgb A1c due 4/3/25    (F41.9) Anxiety  (F32.A) Depression, unspecified depression type  (F31.9) Bipolar 1 disorder (H)  Comment: Chronic. Stable. I do not see any records from psych  Plan:   -Monitor mood and behaviors  -Monitor for worsening s/symptoms of concerns  -Monitor for changes in mobility, eating and sleeping patterns  -Continue buspirone 7.5mg BID  -Continue duloxetine 60mg daily  -Continue hydroxyzine 25mg BID  -Continue lamotrigine 100mg BID  -Discontinue diazepam given risks  -CMP, CBC, vitamin D, vitamin B12, magnesium, and hgb A1c due 4/3/25    (D64.9) Chronic anemia  Comment: Chronic. Baseline hgb~ 13  Plan:   -Monitor for bleeding risks  -Monitor for worsening s/symptoms of concerns  -Continue ferrous sulfate every other day. Recommend to stop if hgb >10 on next assessment  -CMP, CBC, vitamin D, vitamin B12, magnesium, and hgb A1c due 4/3/25    (H81.09) Meniere disease  Comment: Chronic. Followed with ENT--last visit back in 2022  Plan:  -Monitor for worsening s/symptoms of concerns  -ENT post TCU  -Continue prednisone 6mg daily (appears to be on this chronically since 2017 or so)  -CMP, CBC, vitamin D, vitamin B12, magnesium, and hgb A1c due 4/3/25    (L30.9) Dermatitis  Comment: Acute on chronic. Admitted with wound care to area.   Plan:  -Encourage repositioning  -Monitor for worsening s/symptoms of concerns  -Start calmoseptine to area BID.    (E11.21) Type 2 diabetes mellitus with diabetic nephropathy, without long-term current use of insulin (H)  Comment: Chronic. Last A1c in Jan 2024 was 8.5%. GOal <9%.   Plan:   -HOLD Weekly ozempic while on TCU  -Monitor for worsening s/symptoms of concerns  -Continue Empaglifozin 25mg daily  -Continue glipizide 10mg daily  -Continue metformin 500mg daily with breakfast  -Monitor Blood  Glucose BID. Update if Blood Glucose <70 and/or >450  -CMP, CBC, vitamin D, vitamin B12, magnesium, and hgb A1c due 4/3/25    (J44.9) Chronic obstructive pulmonary disease, unspecified COPD type (H)  (J45.20) Mild intermittent asthma without complication  Comment: Chronic. Stable.   Plan:   -Monitor respiratory status  -Monitor for worsening s/symptoms of concerns  -CMP, CBC, vitamin D, vitamin B12, magnesium, and hgb A1c due 4/3/25    (N32.81) OAB  Comment: Chronic.   Plan:  -Monitor urinary status  -Continue mirabegron 50mg daily  -Continue solenacin succinate 5mg daily  -Follow up with urology post TCU  -CMP, CBC, vitamin D, vitamin B12, magnesium, and hgb A1c due 4/3/25    49 minutes spent on the date of the encounter doing chart review, history and exam, PMH, documentation and further activities as noted above. Collaboration with nursing staff on site, clinical managers, and therapies were completed on site today.     Electronically signed by:  Dr. Angeli Garcia DNP, APRN, FNP-C, WCS-C, EDS-C    Provider reviewed records from facility, and interpreted most recent imaging/lab work, and vital signs.   Acute and chronic conditions managed by writer. Have been reviewed during today's exam.                      Sincerely,        Angeli Garcia, JUAN ANTONIO CNP    Electronically signed

## 2025-04-03 ENCOUNTER — TRANSCRIBE ORDERS (OUTPATIENT)
Dept: PHYSICAL MEDICINE AND REHAB | Facility: CLINIC | Age: 85
End: 2025-04-03
Payer: MEDICARE

## 2025-04-03 DIAGNOSIS — M54.2 NECK PAIN: Primary | ICD-10-CM

## 2025-04-03 LAB
ALBUMIN SERPL BCG-MCNC: 3.9 G/DL (ref 3.5–5.2)
ALP SERPL-CCNC: 88 U/L (ref 40–150)
ALT SERPL W P-5'-P-CCNC: 21 U/L (ref 0–50)
ANION GAP SERPL CALCULATED.3IONS-SCNC: 12 MMOL/L (ref 7–15)
AST SERPL W P-5'-P-CCNC: 28 U/L (ref 0–45)
BASOPHILS # BLD AUTO: 0 10E3/UL (ref 0–0.2)
BASOPHILS NFR BLD AUTO: 1 %
BILIRUB SERPL-MCNC: 0.4 MG/DL
BUN SERPL-MCNC: 16.4 MG/DL (ref 8–23)
CALCIUM SERPL-MCNC: 10.1 MG/DL (ref 8.8–10.4)
CHLORIDE SERPL-SCNC: 98 MMOL/L (ref 98–107)
CREAT SERPL-MCNC: 1.16 MG/DL (ref 0.51–0.95)
EGFRCR SERPLBLD CKD-EPI 2021: 47 ML/MIN/1.73M2
EOSINOPHIL # BLD AUTO: 0.3 10E3/UL (ref 0–0.7)
EOSINOPHIL NFR BLD AUTO: 4 %
ERYTHROCYTE [DISTWIDTH] IN BLOOD BY AUTOMATED COUNT: 14.7 % (ref 10–15)
EST. AVERAGE GLUCOSE BLD GHB EST-MCNC: 151 MG/DL
GAMMA INTERFERON BACKGROUND BLD IA-ACNC: 0.02 IU/ML
GLUCOSE SERPL-MCNC: 215 MG/DL (ref 70–99)
HBA1C MFR BLD: 6.9 %
HCO3 SERPL-SCNC: 31 MMOL/L (ref 22–29)
HCT VFR BLD AUTO: 42.4 % (ref 35–47)
HGB BLD-MCNC: 12.9 G/DL (ref 11.7–15.7)
IMM GRANULOCYTES # BLD: 0.1 10E3/UL
IMM GRANULOCYTES NFR BLD: 1 %
LYMPHOCYTES # BLD AUTO: 2.2 10E3/UL (ref 0.8–5.3)
LYMPHOCYTES NFR BLD AUTO: 26 %
M TB IFN-G BLD-IMP: NEGATIVE
M TB IFN-G CD4+ BCKGRND COR BLD-ACNC: 9.98 IU/ML
MAGNESIUM SERPL-MCNC: 2 MG/DL (ref 1.7–2.3)
MCH RBC QN AUTO: 29 PG (ref 26.5–33)
MCHC RBC AUTO-ENTMCNC: 30.4 G/DL (ref 31.5–36.5)
MCV RBC AUTO: 95 FL (ref 78–100)
MITOGEN IGNF BCKGRD COR BLD-ACNC: 0.01 IU/ML
MITOGEN IGNF BCKGRD COR BLD-ACNC: 0.02 IU/ML
MONOCYTES # BLD AUTO: 0.6 10E3/UL (ref 0–1.3)
MONOCYTES NFR BLD AUTO: 7 %
NEUTROPHILS # BLD AUTO: 5.3 10E3/UL (ref 1.6–8.3)
NEUTROPHILS NFR BLD AUTO: 63 %
NRBC # BLD AUTO: 0 10E3/UL
NRBC BLD AUTO-RTO: 0 /100
PLATELET # BLD AUTO: 253 10E3/UL (ref 150–450)
POTASSIUM SERPL-SCNC: 3.6 MMOL/L (ref 3.4–5.3)
PROT SERPL-MCNC: 6.4 G/DL (ref 6.4–8.3)
QUANTIFERON MITOGEN: 10 IU/ML
QUANTIFERON NIL TUBE: 0.02 IU/ML
QUANTIFERON TB1 TUBE: 0.03 IU/ML
QUANTIFERON TB2 TUBE: 0.04
RBC # BLD AUTO: 4.45 10E6/UL (ref 3.8–5.2)
SODIUM SERPL-SCNC: 141 MMOL/L (ref 135–145)
VIT B12 SERPL-MCNC: 987 PG/ML (ref 232–1245)
VIT D+METAB SERPL-MCNC: 26 NG/ML (ref 20–50)
WBC # BLD AUTO: 8.6 10E3/UL (ref 4–11)

## 2025-04-03 PROCEDURE — 36415 COLL VENOUS BLD VENIPUNCTURE: CPT | Performed by: NURSE PRACTITIONER

## 2025-04-03 PROCEDURE — 82607 VITAMIN B-12: CPT | Mod: ORL | Performed by: NURSE PRACTITIONER

## 2025-04-03 PROCEDURE — 82306 VITAMIN D 25 HYDROXY: CPT | Mod: ORL | Performed by: NURSE PRACTITIONER

## 2025-04-03 PROCEDURE — 83036 HEMOGLOBIN GLYCOSYLATED A1C: CPT | Mod: ORL | Performed by: NURSE PRACTITIONER

## 2025-04-03 PROCEDURE — 83735 ASSAY OF MAGNESIUM: CPT | Mod: ORL | Performed by: NURSE PRACTITIONER

## 2025-04-03 PROCEDURE — 85025 COMPLETE CBC W/AUTO DIFF WBC: CPT | Mod: ORL | Performed by: NURSE PRACTITIONER

## 2025-04-03 PROCEDURE — 82374 ASSAY BLOOD CARBON DIOXIDE: CPT | Performed by: NURSE PRACTITIONER

## 2025-04-03 NOTE — PROGRESS NOTES
"[facility] GERIATRICS    Chief Complaint   Patient presents with    [facility] Acute     HPI:  Jumana Yang is a 84 year old  (1940), who is being seen today for an episodic care visit at: No question data found.. Today's concern is: The primary encounter diagnosis was Physical deconditioning. Diagnoses of Generalized muscle weakness, Personal history of fall, Chronic pain syndrome, Spinal stenosis, unspecified spinal region, Cauda equina compression (H), Essential hypertension, Class 2 severe obesity with body mass index (BMI) of 35 to 39.9 with serious comorbidity (H), Dyslipidemia, Lymphedema, Stage 3a chronic kidney disease (H), Anxiety, Depression, unspecified depression type, Bipolar 1 disorder (H), Chronic anemia, Type 2 diabetes mellitus with diabetic nephropathy, without long-term current use of insulin (H), Chronic obstructive pulmonary disease, unspecified COPD type (H), Mild intermittent asthma without complication, Meniere's disease, unspecified laterality, OAB (overactive bladder), Dermatitis, and Dry skin were also pertinent to this visit.    ***    BP Readings from Last 3 Encounters:   04/02/25 (!) 152/71   04/01/25 (!) 143/65   02/11/25 139/79     Wt Readings from Last 5 Encounters:   04/02/25 95.5 kg (210 lb 9.6 oz)   04/01/25 99.2 kg (218 lb 11.1 oz)   05/28/24 80.7 kg (178 lb)   02/14/24 93.4 kg (206 lb)   02/08/24 93.7 kg (206 lb 9.1 oz)     Allergies, and PMH/PSH reviewed in [facility] today.  REVIEW OF SYSTEMS:  4 point ROS including Respiratory, CV, GI and , other than that noted in the HPI,  is negative    Objective:   LMP  (LMP Unknown)   GENERAL APPEARANCE:  {[facility] GENERAL APPEARANCE:[id number]}  ENT:  {[facility] ENT PE:358763::\"Mouth and posterior oropharynx normal, moist mucous membranes\"}  EYES:  {[facility] EYES PE :497307::\"EOM, conjunctivae, lids, pupils and irises normal\"}  NECK:  {[facility] NECK PE:952073::\"No adenopathy,masses or thyromegaly\"}  RESP:  {NURSING " "HOME RESP PE:448527}  CV:  {halfway CV PE:438164}  ABDOMEN:  {halfway GI PE:431065::\"normal bowel sounds, soft, nontender, no hepatosplenomegaly or other masses\"}  M/S:   {halfway M/S PE:668217}  SKIN:  {NURSING HOME SKIN PE:541518}  NEURO:   {halfway NEURO PE:199911}  PSYCH:  {halfway PSYCH PE:907106}    Most Recent 3 CBC's:  Recent Labs   Lab Test 04/03/25  0945 03/29/25  0755 03/24/25  1454 02/05/24  0542 02/02/24  0627   WBC 8.6  --  11.5*  --  6.5   HGB 12.9  --  13.7 8.6* 8.5*   MCV 95  --  91  --  76*    200 208  --  362     Most Recent 3 BMP's:  Recent Labs   Lab Test 04/03/25  0945 04/01/25  1204 04/01/25  0816 03/25/25  0802 03/25/25  0745 03/24/25  1850 03/24/25  1454     --   --   --  141  --  143   POTASSIUM 3.6  --   --   --  4.0  --  3.3*   CHLORIDE 98  --   --   --  102  --  100   CO2 31*  --   --   --  28  --  29   BUN 16.4  --   --   --  14.6  --  16.3   CR 1.16*  --   --   --  1.01*  --  1.07*   ANIONGAP 12  --   --   --  11  --  14   MACY 10.1  --   --   --  9.1  --  9.5   * 214* 141*   < > 187*   < > 129*    < > = values in this interval not displayed.     Most Recent 2 LFT's:  Recent Labs   Lab Test 04/03/25  0945 01/28/24  1329   AST 28 51*   ALT 21 33   ALKPHOS 88 149   BILITOTAL 0.4 0.4     Most Recent Hemoglobin A1c:  Recent Labs   Lab Test 04/03/25  0945   A1C 6.9*     Most Recent Anemia Panel:  Recent Labs   Lab Test 04/03/25  0945 04/13/22  0559 04/10/22  1123   WBC 8.6   < >  --    HGB 12.9   < >  --    HCT 42.4   < >  --    MCV 95   < >  --       < >  --    IRON  --   --  46   B12 987  --   --     < > = values in this interval not displayed.       Magnesium   Date Value Ref Range Status   04/03/2025 2.0 1.7 - 2.3 mg/dL Final     Vitamin D Deficiency Screening Results:  Lab Results   Component Value Date    VITDT 26 04/03/2025    VITDT 49 02/14/2017    VITDT 45 11/26/2013     ASSESSMENT/PLAN:     (R53.81) Physical deconditioning  " (primary encounter diagnosis)  (M62.81) Generalized muscle weakness  Comment: Acute on chronic. S/T below diagnosis  Plan:   -Continue Physical therapy and Occupational therapy as directed  -SW to remain involved for safe discharge planning needs     (Z91.81) Personal history of fall  (G89.4) Chronic pain syndrome  (G83.4) Cauda equina compression (H)  (M48.00) Spinal stenosis, unspecified spinal region  Comment: Acute on chronic. Per hospital records-presenting for leg pain and weakness following a fall. She is clinically stable. She has a known history of lumbar spinal stenosis as well as previously demonstrated L5 nerve impingement. By her report, she has had a chronic worsening of her functional status over time. The lower extremity weakness today appeared to precede her fall. There appears to have been some progression of lumbar spinal canal stenosis compared to the MRI from 9/25/24.   Plan:   -Monitor pain complaints as directed  -Follow up with neurosurgery as directed   -Start vitamin D 2000U daily  -Continue lyrica 100mg TID (max dose for CrCl)  -Change Tylenol to 1000mg BID and BID PRN  -Continue robaxin 500 TID PRN. Discontinue if not used  -Continue oxycodone 5mg every 4 hours PRN  -BMP and CBC due 4/8/25     (I10) Essential hypertension  (E66.812,  E66.01) Class 2 severe obesity with body mass index (BMI) of 35 to 39.9 with serious comorbidity (H)  (E78.5) Dyslipidemia  (I89.0) Lymphedema  (N18.31) Stage 3a chronic kidney disease (H)  Comment: Chronic. Based on JNC-8 goals,  patients age of 84 year old, presence of diabetes or CKD, and goals of care goal BP is  <140/90 mm Hg. Patient is stable with current plan of care and routine assessment..  Plan:   -Monitor BP and HR  -Continue rosuvastatin 10mg daily  -Continue TG compression stockings to bilateral lower extremities daily. On in Am and off at HS  -Continue triamterene-hydrochlorothiazide 37.5-25mg daily.. HOLD if SBP<100  -BMP and CBC due 4/8/25      (F41.9) Anxiety  (F32.A) Depression, unspecified depression type  (F31.9) Bipolar 1 disorder (H)  Comment: Chronic. Stable. I do not see any records from psych  Plan:   -Monitor mood and behaviors  -Monitor for worsening s/symptoms of concerns  -Monitor for changes in mobility, eating and sleeping patterns  -Continue buspirone 7.5mg BID  -Continue duloxetine 60mg daily  -Continue hydroxyzine 25mg BID  -Continue lamotrigine 100mg BID  -BMP and CBC due 4/8/25     (D64.9) Chronic anemia  Comment: Chronic. Baseline hgb~ 13  Plan:   -Monitor for bleeding risks  -Monitor for worsening s/symptoms of concerns  -Discontinue ferrous sulfate  -BMP and CBC due 4/8/25     (H81.09) Meniere disease  Comment: Chronic. Followed with ENT--last visit back in 2022  Plan:  -Monitor for worsening s/symptoms of concerns  -ENT post TCU  -Continue prednisone 6mg daily (appears to be on this chronically since 2017 or so)  -BMP and CBC due 4/8/25     (L30.9) Dermatitis  Comment: Acute on chronic. Admitted with wound care to area.   Plan:  -Encourage repositioning  -Monitor for worsening s/symptoms of concerns  -Start calmoseptine to area BID.     (E11.21) Type 2 diabetes mellitus with diabetic nephropathy, without long-term current use of insulin (H)  Comment: Chronic. Last A1c yesterday 4/3/25 was 6.8%. Goal <9%.   Plan:   -HOLD Weekly ozempic while on TCU  -Monitor for worsening s/symptoms of concerns  -Continue Empaglifozin 25mg daily  -Reduce glipizide down to 5mg daily  -Continue metformin 500mg daily with breakfast  -Monitor Blood Glucose BID. Update if Blood Glucose <70 and/or >450  -BMP and CBC due 4/8/25    Hemoglobin A1C   Date Value Ref Range Status   04/03/2025 6.9 (H) <5.7 % Final     Comment:     Normal <5.7%   Prediabetes 5.7-6.4%    Diabetes 6.5% or higher     Note: Adopted from ADA consensus guidelines.   10/24/2017 6.8 (H) 4.3 - 6.0 % Final          (J44.9) Chronic obstructive pulmonary disease, unspecified COPD type  (H)  (J45.20) Mild intermittent asthma without complication  Comment: Chronic. Stable.   Plan:   -Monitor respiratory status  -Monitor for worsening s/symptoms of concerns  -BMP and CBC due 4/8/25     (N32.81) OAB  Comment: Chronic.   Plan:  -Monitor urinary status  -Continue mirabegron 50mg daily  -Continue solenacin succinate 5mg daily  -Follow up with urology post TCU  -BMP and CBC due 4/8/25     Electronically signed by:  Dr. Angeli Garcia DNP, APRN, FNP-C, WCS-C, EDS-C     Provider reviewed records from facility, and interpreted most recent imaging/lab work, and vital signs.   Acute and chronic conditions managed by writer. Have been reviewed during today's exam.

## 2025-04-04 ENCOUNTER — TRANSITIONAL CARE UNIT VISIT (OUTPATIENT)
Dept: GERIATRICS | Facility: CLINIC | Age: 85
End: 2025-04-04
Payer: MEDICARE

## 2025-04-04 VITALS
BODY MASS INDEX: 32.95 KG/M2 | TEMPERATURE: 97.2 F | HEART RATE: 78 BPM | DIASTOLIC BLOOD PRESSURE: 65 MMHG | RESPIRATION RATE: 18 BRPM | SYSTOLIC BLOOD PRESSURE: 147 MMHG | OXYGEN SATURATION: 99 % | WEIGHT: 205 LBS | HEIGHT: 66 IN

## 2025-04-04 DIAGNOSIS — M62.81 GENERALIZED MUSCLE WEAKNESS: ICD-10-CM

## 2025-04-04 DIAGNOSIS — G83.4 CAUDA EQUINA COMPRESSION (H): ICD-10-CM

## 2025-04-04 DIAGNOSIS — E11.21 TYPE 2 DIABETES MELLITUS WITH DIABETIC NEPHROPATHY, WITHOUT LONG-TERM CURRENT USE OF INSULIN (H): ICD-10-CM

## 2025-04-04 DIAGNOSIS — I89.0 LYMPHEDEMA: ICD-10-CM

## 2025-04-04 DIAGNOSIS — D64.9 CHRONIC ANEMIA: ICD-10-CM

## 2025-04-04 DIAGNOSIS — J45.20 MILD INTERMITTENT ASTHMA WITHOUT COMPLICATION: ICD-10-CM

## 2025-04-04 DIAGNOSIS — G89.4 CHRONIC PAIN SYNDROME: ICD-10-CM

## 2025-04-04 DIAGNOSIS — R53.81 PHYSICAL DECONDITIONING: Primary | ICD-10-CM

## 2025-04-04 DIAGNOSIS — H81.09 MENIERE'S DISEASE, UNSPECIFIED LATERALITY: ICD-10-CM

## 2025-04-04 DIAGNOSIS — M48.061 SPINAL STENOSIS OF LUMBAR REGION, UNSPECIFIED WHETHER NEUROGENIC CLAUDICATION PRESENT: ICD-10-CM

## 2025-04-04 DIAGNOSIS — F32.A DEPRESSION, UNSPECIFIED DEPRESSION TYPE: ICD-10-CM

## 2025-04-04 DIAGNOSIS — N32.81 OAB (OVERACTIVE BLADDER): ICD-10-CM

## 2025-04-04 DIAGNOSIS — Z91.81 PERSONAL HISTORY OF FALL: ICD-10-CM

## 2025-04-04 DIAGNOSIS — M48.00 SPINAL STENOSIS, UNSPECIFIED SPINAL REGION: ICD-10-CM

## 2025-04-04 DIAGNOSIS — E78.5 DYSLIPIDEMIA: ICD-10-CM

## 2025-04-04 DIAGNOSIS — F31.9 BIPOLAR 1 DISORDER (H): ICD-10-CM

## 2025-04-04 DIAGNOSIS — K59.01 SLOW TRANSIT CONSTIPATION: ICD-10-CM

## 2025-04-04 DIAGNOSIS — L85.3 DRY SKIN: ICD-10-CM

## 2025-04-04 DIAGNOSIS — N18.31 STAGE 3A CHRONIC KIDNEY DISEASE (H): ICD-10-CM

## 2025-04-04 DIAGNOSIS — E66.01 CLASS 2 SEVERE OBESITY WITH BODY MASS INDEX (BMI) OF 35 TO 39.9 WITH SERIOUS COMORBIDITY (H): ICD-10-CM

## 2025-04-04 DIAGNOSIS — L30.9 DERMATITIS: ICD-10-CM

## 2025-04-04 DIAGNOSIS — F41.9 ANXIETY: ICD-10-CM

## 2025-04-04 DIAGNOSIS — I10 ESSENTIAL HYPERTENSION: ICD-10-CM

## 2025-04-04 DIAGNOSIS — J44.9 CHRONIC OBSTRUCTIVE PULMONARY DISEASE, UNSPECIFIED COPD TYPE (H): ICD-10-CM

## 2025-04-04 DIAGNOSIS — E66.812 CLASS 2 SEVERE OBESITY WITH BODY MASS INDEX (BMI) OF 35 TO 39.9 WITH SERIOUS COMORBIDITY (H): ICD-10-CM

## 2025-04-04 PROCEDURE — 99309 SBSQ NF CARE MODERATE MDM 30: CPT | Performed by: NURSE PRACTITIONER

## 2025-04-04 RX ORDER — OXYCODONE HYDROCHLORIDE 5 MG/1
5 TABLET ORAL EVERY 4 HOURS PRN
Qty: 30 TABLET | Refills: 0 | Status: SHIPPED | OUTPATIENT
Start: 2025-04-04 | End: 2025-04-08

## 2025-04-04 RX ORDER — AMOXICILLIN 250 MG
CAPSULE ORAL
Status: SHIPPED
Start: 2025-04-04 | End: 2025-04-08

## 2025-04-04 RX ORDER — ACETAMINOPHEN 500 MG
TABLET ORAL
Qty: 240 TABLET | Refills: 2 | Status: SHIPPED | OUTPATIENT
Start: 2025-04-04

## 2025-04-04 RX ORDER — GLIPIZIDE 5 MG/1
5 TABLET, FILM COATED, EXTENDED RELEASE ORAL DAILY
Qty: 30 TABLET | Refills: 1 | Status: SHIPPED | OUTPATIENT
Start: 2025-04-04

## 2025-04-04 NOTE — LETTER
4/4/2025      Jumana Yang  [address]  [place] [address]        [facility] GERIATRICS    Chief Complaint   Patient presents with     Nursing Home Acute     HPI:  Jumana Yang is a 84 year old  (1940), who is being seen today for an episodic care visit at: [facility] ([facility])([facility]) [[id number]]. Today's concern is: The primary encounter diagnosis was Physical deconditioning. Diagnoses of Generalized muscle weakness, Personal history of fall, Chronic pain syndrome, Spinal stenosis, unspecified spinal region, Cauda equina compression (H), Essential hypertension, Class 2 severe obesity with body mass index (BMI) of 35 to 39.9 with serious comorbidity (H), Dyslipidemia, Lymphedema, Stage 3a chronic kidney disease (H), Anxiety, Depression, unspecified depression type, Bipolar 1 disorder (H), Chronic anemia, Type 2 diabetes mellitus with diabetic nephropathy, without long-term current use of insulin (H), Chronic obstructive pulmonary disease, unspecified COPD type (H), Mild intermittent asthma without complication, Meniere's disease, unspecified laterality, OAB (overactive bladder), Dermatitis, Dry skin, Spinal stenosis of lumbar region, unspecified whether neurogenic claudication present, and Slow transit constipation were also pertinent to this visit.    Met with patient who is very hard of hearing. Does well with written communication with staff. She denies any chest pain, palpitations, shortness of breath, LOPEZ, lightheadedness, dizziness, or cough. Denies any abdominal discomfort. Denies N&V. Denies dysuria or frequency. Constipation present. Last BM about 3-4 days per report. Appetite good. Sleeping well. Ongoing chronic low back pain. Utilizing PRN agents with good relief.     BP Readings from Last 3 Encounters:   04/04/25 (!) 147/65   04/02/25 (!) 152/71   04/01/25 (!) 143/65     Wt Readings from Last 5 Encounters:   04/04/25 93 kg (205 lb)   04/02/25 95.5 kg  "(210 lb 9.6 oz)   04/01/25 99.2 kg (218 lb 11.1 oz)   05/28/24 80.7 kg (178 lb)   02/14/24 93.4 kg (206 lb)     Allergies, and PMH/PSH reviewed in EPIC today.  REVIEW OF SYSTEMS:  4 point ROS including Respiratory, CV, GI and , other than that noted in the HPI,  is negative    Objective:   BP (!) 147/65   Pulse 78   Temp 97.2  F (36.2  C)   Resp 18   Ht 1.676 m (5' 6\")   Wt 93 kg (205 lb)   LMP  (LMP Unknown)   SpO2 99%   BMI 33.09 kg/m    GENERAL APPEARANCE:  Alert, in no distress, oriented, cooperative  ENT:  Mouth and posterior oropharynx normal, moist mucous membranes, [place]  EYES:  EOM, conjunctivae, lids, pupils and irises normal  NECK:  No adenopathy,masses or thyromegaly  RESP:  respiratory effort and palpation of chest normal, lungs clear to auscultation , no respiratory distress  CV:  regular rate and rhythm, no murmur, rub, or gallop, peripheral edema 1+ in BLE  ABDOMEN:  normal bowel sounds, soft, nontender, no hepatosplenomegaly or other masses, no guarding or rebound  M/S:   Ambulates with walker. Staff to assist for ADLs, transfers and cares. Wheelchair for long distance  SKIN:  Inspection of skin and subcutaneous tissue baseline, Palpation of skin and subcutaneous tissue baseline  NEURO:   Cranial nerves 2-12 are normal tested and grossly at patient's baseline, no purposeful movement in upper and lower extremities  PSYCH:  oriented X 3, affect and mood normal    Most Recent 3 CBC's:  Recent Labs   Lab Test 04/03/25  0945 03/29/25  0755 03/24/25  1454 02/05/24  0542 02/02/24  0627   WBC 8.6  --  11.5*  --  6.5   HGB 12.9  --  13.7 8.6* 8.5*   MCV 95  --  91  --  76*    200 208  --  362     Most Recent 3 BMP's:  Recent Labs   Lab Test 04/03/25  0945 04/01/25  1204 04/01/25  0816 03/25/25  0802 03/25/25  0745 03/24/25  1850 03/24/25  1454     --   --   --  141  --  143   POTASSIUM 3.6  --   --   --  4.0  --  3.3*   CHLORIDE 98  --   --   --  102  --  100   CO2 31*  --   --   --  28  " --  29   BUN 16.4  --   --   --  14.6  --  16.3   CR 1.16*  --   --   --  1.01*  --  1.07*   ANIONGAP 12  --   --   --  11  --  14   MACY 10.1  --   --   --  9.1  --  9.5   * 214* 141*   < > 187*   < > 129*    < > = values in this interval not displayed.     Most Recent 2 LFT's:  Recent Labs   Lab Test 04/03/25  0945 01/28/24  1329   AST 28 51*   ALT 21 33   ALKPHOS 88 149   BILITOTAL 0.4 0.4     Most Recent Hemoglobin A1c:  Recent Labs   Lab Test 04/03/25  0945   A1C 6.9*     Most Recent Anemia Panel:  Recent Labs   Lab Test 04/03/25  0945 04/13/22  0559 04/10/22  1123   WBC 8.6   < >  --    HGB 12.9   < >  --    HCT 42.4   < >  --    MCV 95   < >  --       < >  --    IRON  --   --  46   B12 987  --   --     < > = values in this interval not displayed.       Magnesium   Date Value Ref Range Status   04/03/2025 2.0 1.7 - 2.3 mg/dL Final     Vitamin D Deficiency Screening Results:  Lab Results   Component Value Date    VITDT 26 04/03/2025    VITDT 49 02/14/2017    VITDT 45 11/26/2013     ASSESSMENT/PLAN:     (R53.81) Physical deconditioning  (primary encounter diagnosis)  (M62.81) Generalized muscle weakness  Comment: Acute on chronic. S/T below diagnosis  Plan:   -Continue Physical therapy and Occupational therapy as directed  -SW to remain involved for safe discharge planning needs     (Z91.81) Personal history of fall  (G89.4) Chronic pain syndrome  (G83.4) Cauda equina compression (H)  (M48.00) Spinal stenosis, unspecified spinal region  Comment: Acute on chronic. Per hospital records-presenting for leg pain and weakness following a fall. She is clinically stable. She has a known history of lumbar spinal stenosis as well as previously demonstrated L5 nerve impingement. By her report, she has had a chronic worsening of her functional status over time. The lower extremity weakness today appeared to precede her fall. There appears to have been some progression of lumbar spinal canal stenosis compared to  the MRI from 9/25/24.   Plan:   -Monitor pain complaints as directed  -Follow up with neurosurgery as directed   -Start vitamin D 2000U daily  -Continue lyrica 100mg TID (max dose for CrCl)  -Change Tylenol to 1000mg BID and BID PRN  -Continue robaxin 500 TID PRN. Discontinue if not used  -Continue oxycodone 5mg every 4 hours PRN  -BMP and CBC due 4/8/25     (I10) Essential hypertension  (E66.812,  E66.01) Class 2 severe obesity with body mass index (BMI) of 35 to 39.9 with serious comorbidity (H)  (E78.5) Dyslipidemia  (I89.0) Lymphedema  (N18.31) Stage 3a chronic kidney disease (H)  Comment: Chronic. Based on JNC-8 goals,  patients age of 84 year old, presence of diabetes or CKD, and goals of care goal BP is  <140/90 mm Hg. Patient is stable with current plan of care and routine assessment..  Plan:   -Monitor BP and HR  -Continue rosuvastatin 10mg daily  -Continue TG compression stockings to bilateral lower extremities daily. On in Am and off at HS  -Continue triamterene-hydrochlorothiazide 37.5-25mg daily.. HOLD if SBP<100  -BMP and CBC due 4/8/25     (F41.9) Anxiety  (F32.A) Depression, unspecified depression type  (F31.9) Bipolar 1 disorder (H)  Comment: Chronic. Stable. I do not see any records from psych  Plan:   -Monitor mood and behaviors  -Monitor for worsening s/symptoms of concerns  -Monitor for changes in mobility, eating and sleeping patterns  -Continue buspirone 7.5mg BID  -Continue duloxetine 60mg daily  -Continue hydroxyzine 25mg BID  -Continue lamotrigine 100mg BID  -BMP and CBC due 4/8/25     (D64.9) Chronic anemia  Comment: Chronic. Baseline hgb~ 13  Plan:   -Monitor for bleeding risks  -Monitor for worsening s/symptoms of concerns  -Discontinue ferrous sulfate  -BMP and CBC due 4/8/25     (H81.09) Meniere disease  Comment: Chronic. Followed with ENT--last visit back in 2022  Plan:  -Monitor for worsening s/symptoms of concerns  -ENT post TCU  -Continue prednisone 6mg daily (appears to be on this  chronically since 2017 or so)  -BMP and CBC due 4/8/25     (L30.9) Dermatitis  Comment: Acute on chronic. Admitted with wound care to area.   Plan:  -Encourage repositioning  -Monitor for worsening s/symptoms of concerns  -Start calmoseptine to area BID.     (E11.21) Type 2 diabetes mellitus with diabetic nephropathy, without long-term current use of insulin (H)  Comment: Chronic. Last A1c yesterday 4/3/25 was 6.8%. Goal <9%.   Plan:   -HOLD Weekly ozempic while on TCU  -Monitor for worsening s/symptoms of concerns  -Continue Empaglifozin 25mg daily  -Reduce glipizide down to 5mg daily  -Continue metformin 500mg daily with breakfast  -Monitor Blood Glucose BID. Update if Blood Glucose <70 and/or >450  -BMP and CBC due 4/8/25    Hemoglobin A1C   Date Value Ref Range Status   04/03/2025 6.9 (H) <5.7 % Final     Comment:     Normal <5.7%   Prediabetes 5.7-6.4%    Diabetes 6.5% or higher     Note: Adopted from ADA consensus guidelines.   10/24/2017 6.8 (H) 4.3 - 6.0 % Final          (J44.9) Chronic obstructive pulmonary disease, unspecified COPD type (H)  (J45.20) Mild intermittent asthma without complication  Comment: Chronic. Stable.   Plan:   -Monitor respiratory status  -Monitor for worsening s/symptoms of concerns  -BMP and CBC due 4/8/25    (K59.01) Constipation  Comment: Acute on chronic. Suspect S/T polypharmacy  Plan:  -Change senna S to 1 tab BID and BID PRN  -Miralax daily PRN  -Monitor BM patterns     (N32.81) OAB  Comment: Chronic.   Plan:  -Monitor urinary status  -Continue mirabegron 50mg daily  -Continue solenacin succinate 5mg daily  -Follow up with urology post TCU  -BMP and CBC due 4/8/25     Electronically signed by:  Dr. Angeli Garcia DNP, APRN, FNP-C, WCS-C, EDS-C     Provider reviewed records from facility, and interpreted most recent imaging/lab work, and vital signs.   Acute and chronic conditions managed by writer. Have been reviewed during today's exam.          Sincerely,        Angeli SANTOS  JUAN ANTONIO Garcia CNP    Electronically signed

## 2025-04-05 RX ORDER — CHOLECALCIFEROL (VITAMIN D3) 50 MCG
1 TABLET ORAL DAILY
Qty: 30 TABLET | Refills: 3 | Status: SHIPPED | OUTPATIENT
Start: 2025-04-05

## 2025-04-07 ENCOUNTER — PATIENT OUTREACH (OUTPATIENT)
Dept: CARE COORDINATION | Facility: CLINIC | Age: 85
End: 2025-04-07
Payer: MEDICARE

## 2025-04-07 ENCOUNTER — DOCUMENTATION ONLY (OUTPATIENT)
Dept: OTHER | Facility: CLINIC | Age: 85
End: 2025-04-07
Payer: MEDICARE

## 2025-04-07 NOTE — PROGRESS NOTES
[facility] GERIATRICS    Chief Complaint   Patient presents with    RECHECK     HPI:  Jumana Yang is a 84 year old  (1940), who is being seen today for an episodic care visit at: [facility] ([facility])([facility]) [[id number]]. Today's concern is: The primary encounter diagnosis was Physical deconditioning. Diagnoses of Generalized muscle weakness, Personal history of fall, Spinal stenosis, unspecified spinal region, Chronic pain syndrome, Cauda equina compression (H), Essential hypertension, Class 2 severe obesity with body mass index (BMI) of 35 to 39.9 with serious comorbidity (H), Dyslipidemia, Lymphedema, Stage 3a chronic kidney disease (H), Anxiety, Depression, unspecified depression type, Bipolar 1 disorder (H), Chronic anemia, Type 2 diabetes mellitus with diabetic nephropathy, without long-term current use of insulin (H), Chronic obstructive pulmonary disease, unspecified COPD type (H), Mild intermittent asthma without complication, OAB (overactive bladder), Meniere's disease, unspecified laterality, Dermatitis, Dry skin, Spinal stenosis of lumbar region, unspecified whether neurogenic claudication present, Slow transit constipation, and JANIE (generalized anxiety disorder) were also pertinent to this visit.    Met with patient accompanied with son at bedside. She is very [place]. Hearing difficulties often fluctuate. Does well with written communication. She denies any chest pain, palpitations, shortness of breath, LOPEZ, lightheadedness, dizziness, or cough. Reports abdominal discomfort with LBM 1 week ago. She reports being quite anxious regarding this. Reports some nausea complaints. No emesis. Denies dysuria or frequency. Suspect S/T polypharmacy. Appetite good. Sleeping well. Ongoing chronic pain which is stable with current regimen.     BP Readings from Last 3 Encounters:   04/08/25 (!) 159/79   04/04/25 (!) 147/65   04/02/25 (!) 152/71     Wt Readings from Last 5 Encounters:  "  04/08/25 93.4 kg (206 lb)   04/04/25 93 kg (205 lb)   04/02/25 95.5 kg (210 lb 9.6 oz)   04/01/25 99.2 kg (218 lb 11.1 oz)   05/28/24 80.7 kg (178 lb)     Allergies, and PMH/PSH reviewed in EPIC today.  REVIEW OF SYSTEMS:  4 point ROS including Respiratory, CV, GI and , other than that noted in the HPI,  is negative    Objective:   BP (!) 159/79   Pulse 85   Temp 97.5  F (36.4  C)   Resp 18   Ht 1.676 m (5' 6\")   Wt 93.4 kg (206 lb)   LMP  (LMP Unknown)   SpO2 (!) 91%   BMI 33.25 kg/m    GENERAL APPEARANCE:  Alert, anxious, cooperative  ENT:  Mouth and posterior oropharynx normal, moist mucous membranes, [place]  EYES:  EOM, conjunctivae, lids, pupils and irises normal  NECK:  No adenopathy,masses or thyromegaly  RESP:  respiratory effort and palpation of chest normal, lungs clear to auscultation , no respiratory distress  CV:  regular rate and rhythm, no murmur, rub, or gallop, peripheral edema 1+ in BLE  ABDOMEN:  normal bowel sounds, soft, nontender, no hepatosplenomegaly or other masses, no guarding or rebound  M/S:   Ambulates short distance. Wheelchair for long distance needs  SKIN:  Inspection of skin and subcutaneous tissue baseline, Palpation of skin and subcutaneous tissue baseline  NEURO:   Cranial nerves 2-12 are normal tested and grossly at patient's baseline, no purposeful movement in upper and lower extremities  PSYCH:  oriented X 3, affect and mood normal, anxious    Most Recent 3 CBC's:  Recent Labs   Lab Test 04/03/25  0945 03/29/25  0755 03/24/25  1454 02/05/24  0542 02/02/24  0627   WBC 8.6  --  11.5*  --  6.5   HGB 12.9  --  13.7 8.6* 8.5*   MCV 95  --  91  --  76*    200 208  --  362     Most Recent 3 BMP's:  Recent Labs   Lab Test 04/03/25  0945 04/01/25  1204 04/01/25  0816 03/25/25  0802 03/25/25  0745 03/24/25  1850 03/24/25  1454     --   --   --  141  --  143   POTASSIUM 3.6  --   --   --  4.0  --  3.3*   CHLORIDE 98  --   --   --  102  --  100   CO2 31*  --   --   -- "  28  --  29   BUN 16.4  --   --   --  14.6  --  16.3   CR 1.16*  --   --   --  1.01*  --  1.07*   ANIONGAP 12  --   --   --  11  --  14   MACY 10.1  --   --   --  9.1  --  9.5   * 214* 141*   < > 187*   < > 129*    < > = values in this interval not displayed.     Most Recent 2 LFT's:  Recent Labs   Lab Test 04/03/25  0945 01/28/24  1329   AST 28 51*   ALT 21 33   ALKPHOS 88 149   BILITOTAL 0.4 0.4     Most Recent Hemoglobin A1c:  Recent Labs   Lab Test 04/03/25  0945   A1C 6.9*     Most Recent Anemia Panel:  Recent Labs   Lab Test 04/03/25  0945 04/13/22  0559 04/10/22  1123   WBC 8.6   < >  --    HGB 12.9   < >  --    HCT 42.4   < >  --    MCV 95   < >  --       < >  --    IRON  --   --  46   B12 987  --   --     < > = values in this interval not displayed.       ASSESSMENT/PLAN:     (R53.81) Physical deconditioning  (primary encounter diagnosis)  (M62.81) Generalized muscle weakness  Comment: Acute on chronic. S/T below diagnosis. Per therapy- she requires Christina for UB needs, ModA for LB needs and toileting. Ambulates with 2WW up to 30 feet ModA. Christina for sit to stand transfers.   Plan:   -Continue Physical therapy and Occupational therapy as directed  -SW to remain involved for safe discharge planning needs     (Z91.81) Personal history of fall  (G89.4) Chronic pain syndrome  (G83.4) Cauda equina compression (H)  (M48.00) Spinal stenosis, unspecified spinal region  Comment: Acute on chronic. Per hospital records-presenting for leg pain and weakness following a fall. She is clinically stable. She has a known history of lumbar spinal stenosis as well as previously demonstrated L5 nerve impingement. By her report, she has had a chronic worsening of her functional status over time. The lower extremity weakness today appeared to precede her fall. There appears to have been some progression of lumbar spinal canal stenosis compared to the MRI from 9/25/24.   Plan:   -Monitor pain complaints as  directed  -Follow up with neurosurgery as directed   -Continue vitamin D 2000U daily  -Continue lyrica 100mg TID (max dose for CrCl)  -Continue Tylenol 1000mg BID and BID PRN  -Discontinue robaxin due to non use  -Change oxycodone to 5mg every 6 hours PRN.   -BMP and CBC due 4/8/25--however these were not drawn and were missed by staff, therefore will reorder for next lab day on 4/10/25  -CMP and CBC due 4/14/25     (I10) Essential hypertension  (E66.812,  E66.01) Class 2 severe obesity with body mass index (BMI) of 35 to 39.9 with serious comorbidity (H)  (E78.5) Dyslipidemia  (I89.0) Lymphedema  (N18.31) Stage 3a chronic kidney disease (H)  Comment: Chronic. Based on JNC-8 goals,  patients age of 84 year old, presence of diabetes or CKD, and goals of care goal BP is  <140/90 mm Hg. Patient is stable with current plan of care and routine assessment..  Plan:   -Monitor BP and HR  -Continue rosuvastatin 10mg daily  -Continue TG compression stockings to bilateral lower extremities daily. On in Am and off at HS  -Continue triamterene-hydrochlorothiazide 37.5-25mg daily.. HOLD if SBP<100  -BMP and CBC due 4/8/25--however these were not drawn and were missed by staff, therefore will reorder for next lab day on 4/10/25  -CMP and CBC due 4/14/25         (F41.9) Anxiety  (F32.A) Depression, unspecified depression type  (F31.9) Bipolar 1 disorder (H)  Comment: Chronic. Stable. I do not see any records from psych  Plan:   -Monitor mood and behaviors  -Monitor for worsening s/symptoms of concerns  -Monitor for changes in mobility, eating and sleeping patterns  -Continue buspirone 7.5mg BID  -Continue duloxetine 60mg daily  -Continue hydroxyzine 25mg BID  -Continue lamotrigine 100mg BID  -BMP and CBC due 4/8/25--however these were not drawn and were missed by staff, therefore will reorder for next lab day on 4/10/25  -CMP and CBC due 4/14/25     (D64.9) Chronic anemia  Comment: Chronic. Baseline hgb~ 13  Plan:   -Monitor for  bleeding risks  -Monitor for worsening s/symptoms of concerns  -Discontinued ferrous sulfate on last visit  -BMP and CBC due 4/8/25--however these were not drawn and were missed by staff, therefore will reorder for next lab day on 4/10/25  -CMP and CBC due 4/14/25     (H81.09) Meniere disease  Comment: Chronic. Followed with ENT--last visit back in 2022  Plan:  -Monitor for worsening s/symptoms of concerns  -ENT post TCU  -Continue prednisone 6mg daily (appears to be on this chronically since 2017 or so)  -BMP and CBC due 4/8/25--however these were not drawn and were missed by staff, therefore will reorder for next lab day on 4/10/25  -CMP and CBC due 4/14/25     (L30.9) Dermatitis  Comment: Acute on chronic. Admitted with wound care to area.   Plan:  -Encourage repositioning  -Monitor for worsening s/symptoms of concerns  -Continue calmoseptine to area BID.     (E11.21) Type 2 diabetes mellitus with diabetic nephropathy, without long-term current use of insulin (H)  Comment: Chronic. Last A1c yesterday 4/3/25 was 6.8%. Goal <9%.   Plan:   -HOLD Weekly ozempic while on TCU  -Monitor for worsening s/symptoms of concerns  -Continue Empaglifozin 25mg daily  -Reduced glipizide down to 5mg daily  -Continue metformin 500mg daily with breakfast  -Monitor Blood Glucose BID. Update if Blood Glucose <70 and/or >450  -BMP and CBC due 4/8/25--however these were not drawn and were missed by staff, therefore will reorder for next lab day on 4/10/25  -CMP and CBC due 4/14/25          (J44.9) Chronic obstructive pulmonary disease, unspecified COPD type (H)  (J45.20) Mild intermittent asthma without complication  Comment: Chronic. Stable.   Plan:   -Monitor respiratory status  -Monitor for worsening s/symptoms of concerns  -BMP and CBC due 4/8/25--however these were not drawn and were missed by staff, therefore will reorder for next lab day on 4/10/25  -CMP and CBC due 4/14/25     (K59.01) Constipation  Comment: Acute on chronic.  Suspect S/T polypharmacy. Last BM 1 week ago.   Plan:  -Change senna S to 2 tabs BID scheduled  -Change miralax to daily scheduled  -Bisacodyl 10mg rectal supp daily PRN  -Give one time Fleet enema now  -Lactulose 20gm BID PRN  -abdominal x ray to rule out SBO  -Monitor BM patterns     (N32.81) OAB  Comment: Chronic.   Plan:  -Monitor urinary status  -Continue mirabegron 50mg daily  -Continue solenacin succinate 5mg daily  -Follow up with urology post TCU  -Cystoscopy scheduled for 4/17/25. Perhaps rescheduled post TCU would be warranted/requested.   -BMP and CBC due 4/8/25--however these were not drawn and were missed by staff, therefore will reorder for next lab day on 4/10/25  -CMP and CBC due 4/14/25     46 minutes spent on the date of the encounter doing chart review, history and exam, PMH, documentation and further activities as noted above. Collaboration with nursing staff on site, clinical managers, and therapies were completed on site today.     Electronically signed by:  Dr. Angeli Garcia DNP, APRN, FNP-C, WCS-C, EDS-C     Provider reviewed records from facility, and interpreted most recent imaging/lab work, and vital signs.   Acute and chronic conditions managed by writer. Have been reviewed during today's exam.

## 2025-04-08 ENCOUNTER — TRANSITIONAL CARE UNIT VISIT (OUTPATIENT)
Dept: GERIATRICS | Facility: CLINIC | Age: 85
End: 2025-04-08
Payer: MEDICARE

## 2025-04-08 VITALS
SYSTOLIC BLOOD PRESSURE: 159 MMHG | BODY MASS INDEX: 33.11 KG/M2 | HEART RATE: 85 BPM | DIASTOLIC BLOOD PRESSURE: 79 MMHG | RESPIRATION RATE: 18 BRPM | TEMPERATURE: 97.5 F | HEIGHT: 66 IN | OXYGEN SATURATION: 91 % | WEIGHT: 206 LBS

## 2025-04-08 DIAGNOSIS — J45.20 MILD INTERMITTENT ASTHMA WITHOUT COMPLICATION: ICD-10-CM

## 2025-04-08 DIAGNOSIS — F31.9 BIPOLAR 1 DISORDER (H): ICD-10-CM

## 2025-04-08 DIAGNOSIS — I89.0 LYMPHEDEMA: ICD-10-CM

## 2025-04-08 DIAGNOSIS — I10 ESSENTIAL HYPERTENSION: ICD-10-CM

## 2025-04-08 DIAGNOSIS — E66.01 CLASS 2 SEVERE OBESITY WITH BODY MASS INDEX (BMI) OF 35 TO 39.9 WITH SERIOUS COMORBIDITY (H): ICD-10-CM

## 2025-04-08 DIAGNOSIS — M48.061 SPINAL STENOSIS OF LUMBAR REGION, UNSPECIFIED WHETHER NEUROGENIC CLAUDICATION PRESENT: ICD-10-CM

## 2025-04-08 DIAGNOSIS — D64.9 CHRONIC ANEMIA: ICD-10-CM

## 2025-04-08 DIAGNOSIS — L30.9 DERMATITIS: ICD-10-CM

## 2025-04-08 DIAGNOSIS — N32.81 OAB (OVERACTIVE BLADDER): ICD-10-CM

## 2025-04-08 DIAGNOSIS — F41.9 ANXIETY: ICD-10-CM

## 2025-04-08 DIAGNOSIS — M62.81 GENERALIZED MUSCLE WEAKNESS: ICD-10-CM

## 2025-04-08 DIAGNOSIS — R53.81 PHYSICAL DECONDITIONING: Primary | ICD-10-CM

## 2025-04-08 DIAGNOSIS — E78.5 DYSLIPIDEMIA: ICD-10-CM

## 2025-04-08 DIAGNOSIS — L85.3 DRY SKIN: ICD-10-CM

## 2025-04-08 DIAGNOSIS — G83.4 CAUDA EQUINA COMPRESSION (H): ICD-10-CM

## 2025-04-08 DIAGNOSIS — H81.09 MENIERE'S DISEASE, UNSPECIFIED LATERALITY: ICD-10-CM

## 2025-04-08 DIAGNOSIS — G89.4 CHRONIC PAIN SYNDROME: ICD-10-CM

## 2025-04-08 DIAGNOSIS — E11.21 TYPE 2 DIABETES MELLITUS WITH DIABETIC NEPHROPATHY, WITHOUT LONG-TERM CURRENT USE OF INSULIN (H): ICD-10-CM

## 2025-04-08 DIAGNOSIS — Z91.81 PERSONAL HISTORY OF FALL: ICD-10-CM

## 2025-04-08 DIAGNOSIS — F41.1 GAD (GENERALIZED ANXIETY DISORDER): ICD-10-CM

## 2025-04-08 DIAGNOSIS — K59.01 SLOW TRANSIT CONSTIPATION: ICD-10-CM

## 2025-04-08 DIAGNOSIS — E66.812 CLASS 2 SEVERE OBESITY WITH BODY MASS INDEX (BMI) OF 35 TO 39.9 WITH SERIOUS COMORBIDITY (H): ICD-10-CM

## 2025-04-08 DIAGNOSIS — N18.31 STAGE 3A CHRONIC KIDNEY DISEASE (H): ICD-10-CM

## 2025-04-08 DIAGNOSIS — J44.9 CHRONIC OBSTRUCTIVE PULMONARY DISEASE, UNSPECIFIED COPD TYPE (H): ICD-10-CM

## 2025-04-08 DIAGNOSIS — F32.A DEPRESSION, UNSPECIFIED DEPRESSION TYPE: ICD-10-CM

## 2025-04-08 DIAGNOSIS — M48.00 SPINAL STENOSIS, UNSPECIFIED SPINAL REGION: ICD-10-CM

## 2025-04-08 PROCEDURE — 99310 SBSQ NF CARE HIGH MDM 45: CPT | Performed by: NURSE PRACTITIONER

## 2025-04-08 RX ORDER — LACTULOSE 10 G/15ML
20 SOLUTION ORAL 2 TIMES DAILY PRN
Qty: 473 ML | Refills: 0 | Status: SHIPPED | OUTPATIENT
Start: 2025-04-08

## 2025-04-08 RX ORDER — SODIUM PHOSPHATE,MONO-DIBASIC 19G-7G/118
1 ENEMA (ML) RECTAL ONCE
Qty: 133 ML | Refills: 0 | Status: SHIPPED | OUTPATIENT
Start: 2025-04-08 | End: 2025-04-08

## 2025-04-08 RX ORDER — OXYCODONE HYDROCHLORIDE 5 MG/1
5 TABLET ORAL EVERY 6 HOURS PRN
Qty: 30 TABLET | Refills: 0 | Status: SHIPPED | OUTPATIENT
Start: 2025-04-08

## 2025-04-08 RX ORDER — BISACODYL 10 MG
10 SUPPOSITORY, RECTAL RECTAL DAILY PRN
Qty: 12 SUPPOSITORY | Refills: 3 | Status: SHIPPED | OUTPATIENT
Start: 2025-04-08

## 2025-04-08 RX ORDER — POLYETHYLENE GLYCOL 3350 17 G/17G
1 POWDER, FOR SOLUTION ORAL DAILY
Status: SHIPPED
Start: 2025-04-08

## 2025-04-08 RX ORDER — AMOXICILLIN 250 MG
2 CAPSULE ORAL 2 TIMES DAILY
Status: SHIPPED
Start: 2025-04-08

## 2025-04-08 NOTE — LETTER
4/8/2025      Jumana Yang  [address]  [place] [address]        [facility] GERIATRICS    Chief Complaint   Patient presents with     RECHECK     HPI:  Jumana Yang is a 84 year old  (1940), who is being seen today for an episodic care visit at: [facility] (TC[facility])([facility]) [[id number]]. Today's concern is: The primary encounter diagnosis was Physical deconditioning. Diagnoses of Generalized muscle weakness, Personal history of fall, Spinal stenosis, unspecified spinal region, Chronic pain syndrome, Cauda equina compression (H), Essential hypertension, Class 2 severe obesity with body mass index (BMI) of 35 to 39.9 with serious comorbidity (H), Dyslipidemia, Lymphedema, Stage 3a chronic kidney disease (H), Anxiety, Depression, unspecified depression type, Bipolar 1 disorder (H), Chronic anemia, Type 2 diabetes mellitus with diabetic nephropathy, without long-term current use of insulin (H), Chronic obstructive pulmonary disease, unspecified COPD type (H), Mild intermittent asthma without complication, OAB (overactive bladder), Meniere's disease, unspecified laterality, Dermatitis, Dry skin, Spinal stenosis of lumbar region, unspecified whether neurogenic claudication present, Slow transit constipation, and JANIE (generalized anxiety disorder) were also pertinent to this visit.    Met with patient accompanied with son at bedside. She is very [place]. Hearing difficulties often fluctuate. Does well with written communication. She denies any chest pain, palpitations, shortness of breath, LOPEZ, lightheadedness, dizziness, or cough. Reports abdominal discomfort with LBM 1 week ago. She reports being quite anxious regarding this. Reports some nausea complaints. No emesis. Denies dysuria or frequency. Suspect S/T polypharmacy. Appetite good. Sleeping well. Ongoing chronic pain which is stable with current regimen.     BP Readings from Last 3 Encounters:   04/08/25 (!) 159/79  "  04/04/25 (!) 147/65   04/02/25 (!) 152/71     Wt Readings from Last 5 Encounters:   04/08/25 93.4 kg (206 lb)   04/04/25 93 kg (205 lb)   04/02/25 95.5 kg (210 lb 9.6 oz)   04/01/25 99.2 kg (218 lb 11.1 oz)   05/28/24 80.7 kg (178 lb)     Allergies, and PMH/PSH reviewed in EPIC today.  REVIEW OF SYSTEMS:  4 point ROS including Respiratory, CV, GI and , other than that noted in the HPI,  is negative    Objective:   BP (!) 159/79   Pulse 85   Temp 97.5  F (36.4  C)   Resp 18   Ht 1.676 m (5' 6\")   Wt 93.4 kg (206 lb)   LMP  (LMP Unknown)   SpO2 (!) 91%   BMI 33.25 kg/m    GENERAL APPEARANCE:  Alert, anxious, cooperative  ENT:  Mouth and posterior oropharynx normal, moist mucous membranes, [place]  EYES:  EOM, conjunctivae, lids, pupils and irises normal  NECK:  No adenopathy,masses or thyromegaly  RESP:  respiratory effort and palpation of chest normal, lungs clear to auscultation , no respiratory distress  CV:  regular rate and rhythm, no murmur, rub, or gallop, peripheral edema 1+ in BLE  ABDOMEN:  normal bowel sounds, soft, nontender, no hepatosplenomegaly or other masses, no guarding or rebound  M/S:   Ambulates short distance. Wheelchair for long distance needs  SKIN:  Inspection of skin and subcutaneous tissue baseline, Palpation of skin and subcutaneous tissue baseline  NEURO:   Cranial nerves 2-12 are normal tested and grossly at patient's baseline, no purposeful movement in upper and lower extremities  PSYCH:  oriented X 3, affect and mood normal, anxious    Most Recent 3 CBC's:  Recent Labs   Lab Test 04/03/25  0945 03/29/25  0755 03/24/25  1454 02/05/24  0542 02/02/24  0627   WBC 8.6  --  11.5*  --  6.5   HGB 12.9  --  13.7 8.6* 8.5*   MCV 95  --  91  --  76*    200 208  --  362     Most Recent 3 BMP's:  Recent Labs   Lab Test 04/03/25  0945 04/01/25  1204 04/01/25  0816 03/25/25  0802 03/25/25  0745 03/24/25  1850 03/24/25  1454     --   --   --  141  --  143   POTASSIUM 3.6  --   --   " --  4.0  --  3.3*   CHLORIDE 98  --   --   --  102  --  100   CO2 31*  --   --   --  28  --  29   BUN 16.4  --   --   --  14.6  --  16.3   CR 1.16*  --   --   --  1.01*  --  1.07*   ANIONGAP 12  --   --   --  11  --  14   MACY 10.1  --   --   --  9.1  --  9.5   * 214* 141*   < > 187*   < > 129*    < > = values in this interval not displayed.     Most Recent 2 LFT's:  Recent Labs   Lab Test 04/03/25  0945 01/28/24  1329   AST 28 51*   ALT 21 33   ALKPHOS 88 149   BILITOTAL 0.4 0.4     Most Recent Hemoglobin A1c:  Recent Labs   Lab Test 04/03/25  0945   A1C 6.9*     Most Recent Anemia Panel:  Recent Labs   Lab Test 04/03/25  0945 04/13/22  0559 04/10/22  1123   WBC 8.6   < >  --    HGB 12.9   < >  --    HCT 42.4   < >  --    MCV 95   < >  --       < >  --    IRON  --   --  46   B12 987  --   --     < > = values in this interval not displayed.       ASSESSMENT/PLAN:     (R53.81) Physical deconditioning  (primary encounter diagnosis)  (M62.81) Generalized muscle weakness  Comment: Acute on chronic. S/T below diagnosis. Per therapy- she requires Christina for UB needs, ModA for LB needs and toileting. Ambulates with 2WW up to 30 feet ModA. Christina for sit to stand transfers.   Plan:   -Continue Physical therapy and Occupational therapy as directed  -SW to remain involved for safe discharge planning needs     (Z91.81) Personal history of fall  (G89.4) Chronic pain syndrome  (G83.4) Cauda equina compression (H)  (M48.00) Spinal stenosis, unspecified spinal region  Comment: Acute on chronic. Per hospital records-presenting for leg pain and weakness following a fall. She is clinically stable. She has a known history of lumbar spinal stenosis as well as previously demonstrated L5 nerve impingement. By her report, she has had a chronic worsening of her functional status over time. The lower extremity weakness today appeared to precede her fall. There appears to have been some progression of lumbar spinal canal stenosis  compared to the MRI from 9/25/24.   Plan:   -Monitor pain complaints as directed  -Follow up with neurosurgery as directed   -Continue vitamin D 2000U daily  -Continue lyrica 100mg TID (max dose for CrCl)  -Continue Tylenol 1000mg BID and BID PRN  -Discontinue robaxin due to non use  -Change oxycodone to 5mg every 6 hours PRN.   -BMP and CBC due 4/8/25--however these were not drawn and were missed by staff, therefore will reorder for next lab day on 4/10/25  -CMP and CBC due 4/14/25     (I10) Essential hypertension  (E66.812,  E66.01) Class 2 severe obesity with body mass index (BMI) of 35 to 39.9 with serious comorbidity (H)  (E78.5) Dyslipidemia  (I89.0) Lymphedema  (N18.31) Stage 3a chronic kidney disease (H)  Comment: Chronic. Based on JNC-8 goals,  patients age of 84 year old, presence of diabetes or CKD, and goals of care goal BP is  <140/90 mm Hg. Patient is stable with current plan of care and routine assessment..  Plan:   -Monitor BP and HR  -Continue rosuvastatin 10mg daily  -Continue TG compression stockings to bilateral lower extremities daily. On in Am and off at HS  -Continue triamterene-hydrochlorothiazide 37.5-25mg daily.. HOLD if SBP<100  -BMP and CBC due 4/8/25--however these were not drawn and were missed by staff, therefore will reorder for next lab day on 4/10/25  -CMP and CBC due 4/14/25         (F41.9) Anxiety  (F32.A) Depression, unspecified depression type  (F31.9) Bipolar 1 disorder (H)  Comment: Chronic. Stable. I do not see any records from psych  Plan:   -Monitor mood and behaviors  -Monitor for worsening s/symptoms of concerns  -Monitor for changes in mobility, eating and sleeping patterns  -Continue buspirone 7.5mg BID  -Continue duloxetine 60mg daily  -Continue hydroxyzine 25mg BID  -Continue lamotrigine 100mg BID  -BMP and CBC due 4/8/25--however these were not drawn and were missed by staff, therefore will reorder for next lab day on 4/10/25  -CMP and CBC due 4/14/25     (D64.9)  Chronic anemia  Comment: Chronic. Baseline hgb~ 13  Plan:   -Monitor for bleeding risks  -Monitor for worsening s/symptoms of concerns  -Discontinued ferrous sulfate on last visit  -BMP and CBC due 4/8/25--however these were not drawn and were missed by staff, therefore will reorder for next lab day on 4/10/25  -CMP and CBC due 4/14/25     (H81.09) Meniere disease  Comment: Chronic. Followed with ENT--last visit back in 2022  Plan:  -Monitor for worsening s/symptoms of concerns  -ENT post TCU  -Continue prednisone 6mg daily (appears to be on this chronically since 2017 or so)  -BMP and CBC due 4/8/25--however these were not drawn and were missed by staff, therefore will reorder for next lab day on 4/10/25  -CMP and CBC due 4/14/25     (L30.9) Dermatitis  Comment: Acute on chronic. Admitted with wound care to area.   Plan:  -Encourage repositioning  -Monitor for worsening s/symptoms of concerns  -Continue calmoseptine to area BID.     (E11.21) Type 2 diabetes mellitus with diabetic nephropathy, without long-term current use of insulin (H)  Comment: Chronic. Last A1c yesterday 4/3/25 was 6.8%. Goal <9%.   Plan:   -HOLD Weekly ozempic while on TCU  -Monitor for worsening s/symptoms of concerns  -Continue Empaglifozin 25mg daily  -Reduced glipizide down to 5mg daily  -Continue metformin 500mg daily with breakfast  -Monitor Blood Glucose BID. Update if Blood Glucose <70 and/or >450  -BMP and CBC due 4/8/25--however these were not drawn and were missed by staff, therefore will reorder for next lab day on 4/10/25  -CMP and CBC due 4/14/25          (J44.9) Chronic obstructive pulmonary disease, unspecified COPD type (H)  (J45.20) Mild intermittent asthma without complication  Comment: Chronic. Stable.   Plan:   -Monitor respiratory status  -Monitor for worsening s/symptoms of concerns  -BMP and CBC due 4/8/25--however these were not drawn and were missed by staff, therefore will reorder for next lab day on 4/10/25  -CMP and  CBC due 4/14/25     (K59.01) Constipation  Comment: Acute on chronic. Suspect S/T polypharmacy. Last BM 1 week ago.   Plan:  -Change senna S to 2 tabs BID scheduled  -Change miralax to daily scheduled  -Bisacodyl 10mg rectal supp daily PRN  -Give one time Fleet enema now  -Lactulose 20gm BID PRN  -abdominal x ray to rule out SBO  -Monitor BM patterns     (N32.81) OAB  Comment: Chronic.   Plan:  -Monitor urinary status  -Continue mirabegron 50mg daily  -Continue solenacin succinate 5mg daily  -Follow up with urology post TCU  -Cystoscopy scheduled for 4/17/25. Perhaps rescheduled post TCU would be warranted/requested.   -BMP and CBC due 4/8/25--however these were not drawn and were missed by staff, therefore will reorder for next lab day on 4/10/25  -CMP and CBC due 4/14/25     46 minutes spent on the date of the encounter doing chart review, history and exam, PMH, documentation and further activities as noted above. Collaboration with nursing staff on site, clinical managers, and therapies were completed on site today.     Electronically signed by:  Dr. Angeli Garcia DNP, APRN, FNP-C, WCS-C, EDS-C     Provider reviewed records from facility, and interpreted most recent imaging/lab work, and vital signs.   Acute and chronic conditions managed by writer. Have been reviewed during today's exam.         Sincerely,        Angeli Garcia, JUAN ANTONIO CNP    Electronically signed

## 2025-04-09 ENCOUNTER — LAB REQUISITION (OUTPATIENT)
Dept: LAB | Facility: CLINIC | Age: 85
End: 2025-04-09
Payer: MEDICARE

## 2025-04-09 DIAGNOSIS — D64.9 ANEMIA, UNSPECIFIED: ICD-10-CM

## 2025-04-09 DIAGNOSIS — N18.30 CHRONIC KIDNEY DISEASE, STAGE 3 UNSPECIFIED (H): ICD-10-CM

## 2025-04-09 DIAGNOSIS — I10 ESSENTIAL (PRIMARY) HYPERTENSION: ICD-10-CM

## 2025-04-09 RX ORDER — PREGABALIN 100 MG/1
100 CAPSULE ORAL 3 TIMES DAILY
Qty: 90 CAPSULE | Refills: 0 | Status: SHIPPED | OUTPATIENT
Start: 2025-04-09

## 2025-04-10 ENCOUNTER — TELEPHONE (OUTPATIENT)
Dept: UROLOGY | Facility: CLINIC | Age: 85
End: 2025-04-10

## 2025-04-10 LAB
ANION GAP SERPL CALCULATED.3IONS-SCNC: 10 MMOL/L (ref 7–15)
BASOPHILS # BLD AUTO: 0 10E3/UL (ref 0–0.2)
BASOPHILS NFR BLD AUTO: 1 %
BUN SERPL-MCNC: 21.9 MG/DL (ref 8–23)
CALCIUM SERPL-MCNC: 9.7 MG/DL (ref 8.8–10.4)
CHLORIDE SERPL-SCNC: 103 MMOL/L (ref 98–107)
CREAT SERPL-MCNC: 1.02 MG/DL (ref 0.51–0.95)
EGFRCR SERPLBLD CKD-EPI 2021: 55 ML/MIN/1.73M2
EOSINOPHIL # BLD AUTO: 0.3 10E3/UL (ref 0–0.7)
EOSINOPHIL NFR BLD AUTO: 4 %
ERYTHROCYTE [DISTWIDTH] IN BLOOD BY AUTOMATED COUNT: 14.7 % (ref 10–15)
GLUCOSE SERPL-MCNC: 78 MG/DL (ref 70–99)
HCO3 SERPL-SCNC: 32 MMOL/L (ref 22–29)
HCT VFR BLD AUTO: 44.3 % (ref 35–47)
HGB BLD-MCNC: 13.5 G/DL (ref 11.7–15.7)
IMM GRANULOCYTES # BLD: 0.1 10E3/UL
IMM GRANULOCYTES NFR BLD: 1 %
LYMPHOCYTES # BLD AUTO: 2 10E3/UL (ref 0.8–5.3)
LYMPHOCYTES NFR BLD AUTO: 34 %
MCH RBC QN AUTO: 29.7 PG (ref 26.5–33)
MCHC RBC AUTO-ENTMCNC: 30.5 G/DL (ref 31.5–36.5)
MCV RBC AUTO: 98 FL (ref 78–100)
MONOCYTES # BLD AUTO: 0.5 10E3/UL (ref 0–1.3)
MONOCYTES NFR BLD AUTO: 9 %
NEUTROPHILS # BLD AUTO: 3.2 10E3/UL (ref 1.6–8.3)
NEUTROPHILS NFR BLD AUTO: 53 %
NRBC # BLD AUTO: 0 10E3/UL
NRBC BLD AUTO-RTO: 0 /100
PLATELET # BLD AUTO: 218 10E3/UL (ref 150–450)
POTASSIUM SERPL-SCNC: 3.4 MMOL/L (ref 3.4–5.3)
RBC # BLD AUTO: 4.54 10E6/UL (ref 3.8–5.2)
SODIUM SERPL-SCNC: 145 MMOL/L (ref 135–145)
WBC # BLD AUTO: 6 10E3/UL (ref 4–11)

## 2025-04-10 PROCEDURE — 85025 COMPLETE CBC W/AUTO DIFF WBC: CPT | Mod: ORL | Performed by: NURSE PRACTITIONER

## 2025-04-10 PROCEDURE — 80048 BASIC METABOLIC PNL TOTAL CA: CPT | Mod: ORL | Performed by: NURSE PRACTITIONER

## 2025-04-10 NOTE — TELEPHONE ENCOUNTER
Call received from care coordinator to reschedule patient's 4/17 surgery with Dr. Clifton. Please call back at earliest convenience.

## 2025-04-11 NOTE — PROGRESS NOTES
[facility] GERIATRICS    Chief Complaint   Patient presents with    RECHECK     HPI:  Jumana Yang is a 84 year old  (1940), who is being seen today for an episodic care visit at: [facility] ([facility]) [[id number]]. Today's concern is: The primary encounter diagnosis was Physical deconditioning. Diagnoses of Generalized muscle weakness, Personal history of fall, Spinal stenosis, unspecified spinal region, Chronic pain syndrome, Cauda equina compression (H), Essential hypertension, Class 2 severe obesity with body mass index (BMI) of 35 to 39.9 with serious comorbidity (H), Dyslipidemia, Lymphedema, Stage 3a chronic kidney disease (H), Anxiety, Depression, unspecified depression type, Bipolar 1 disorder (H), Chronic anemia, Type 2 diabetes mellitus with diabetic nephropathy, without long-term current use of insulin (H), Chronic obstructive pulmonary disease, unspecified COPD type (H), OAB (overactive bladder), Mild intermittent asthma without complication, Meniere's disease, unspecified laterality, Dermatitis, Dry skin, Spinal stenosis of lumbar region, unspecified whether neurogenic claudication present, Slow transit constipation, JANIE (generalized anxiety disorder), Fungal dermatitis, and Groin rash were also pertinent to this visit.    Met with patient who was found sitting upright in wheelchair. She denies any chest pain, palpitations, shortness of breath, LOPEZ, lightheadedness, dizziness, or cough. Denies any abdominal discomfort. Denies N&V. Denies B&B concerns. Denies dysuria or frequency. Denies loose or constipation. Reports stools have now been on looser side due to increased dose of stool agents when she was severely constipated last week. Reports LBM on Saturday. Oral  intake fair. Sleeping well. Reports pain stable with current regimen. Anxiousness present, which I suspect is due to poor hearing loss.     BP Readings from Last 3 Encounters:   04/14/25 120/81  "  04/11/25 (!) 156/78   04/08/25 (!) 159/79     Wt Readings from Last 5 Encounters:   04/14/25 93.1 kg (205 lb 3.2 oz)   04/11/25 93.4 kg (206 lb)   04/08/25 93.4 kg (206 lb)   04/04/25 93 kg (205 lb)   04/02/25 95.5 kg (210 lb 9.6 oz)     Allergies, and PMH/PSH reviewed in EPIC today.  REVIEW OF SYSTEMS:  4 point ROS including Respiratory, CV, GI and , other than that noted in the HPI,  is negative    Objective:   /81   Pulse 78   Temp 98  F (36.7  C)   Resp 18   Ht 1.676 m (5' 6\")   Wt 93.1 kg (205 lb 3.2 oz)   LMP  (LMP Unknown)   SpO2 97%   BMI 33.12 kg/m    GENERAL APPEARANCE:  Alert, in no distress, anxious, cooperative  ENT:  Mouth and posterior oropharynx normal, moist mucous membranes, [place]  EYES:  EOM, conjunctivae, lids, pupils and irises normal  NECK:  No adenopathy,masses or thyromegaly  RESP:  respiratory effort and palpation of chest normal, lungs clear to auscultation , no respiratory distress  CV:  regular rate and rhythm, no murmur, rub, or gallop, no edema, peripheral edema 1+ in BLE  ABDOMEN:  normal bowel sounds, soft, nontender, no hepatosplenomegaly or other masses, no guarding or rebound  M/S:   Ambulates short distance with walker. Wheelchair for long distance needs  SKIN:  Inspection of skin and subcutaneous tissue baseline, redness to buttock  NEURO:   Cranial nerves 2-12 are normal tested and grossly at patient's baseline, no purposeful movement in upper and lower extremities  PSYCH:  oriented X 3, affect and mood normal, anxious    Most Recent 3 CBC's:  Recent Labs   Lab Test 04/14/25  1036 04/10/25  0820 04/03/25  0945   WBC 7.4 6.0 8.6   HGB 13.6 13.5 12.9   MCV 99 98 95    218 253     Most Recent 3 BMP's:  Recent Labs   Lab Test 04/14/25  1036 04/10/25  0820 04/03/25  0945    145 141   POTASSIUM 3.5 3.4 3.6   CHLORIDE 101 103 98   CO2 28 32* 31*   BUN 18.0 21.9 16.4   CR 0.98* 1.02* 1.16*   ANIONGAP 12 10 12   MACY 9.2 9.7 10.1   * 78 215*     Most " Recent 2 LFT's:  Recent Labs   Lab Test 04/14/25  1036 04/03/25  0945   AST 35 28   ALT 35 21   ALKPHOS 91 88   BILITOTAL 0.3 0.4     Most Recent Hemoglobin A1c:  Recent Labs   Lab Test 04/03/25  0945   A1C 6.9*     Most Recent Anemia Panel:  Recent Labs   Lab Test 04/14/25  1036 04/10/25  0820 04/03/25  0945 04/13/22  0559 04/10/22  1123   WBC 7.4   < > 8.6   < >  --    HGB 13.6   < > 12.9   < >  --    HCT 45.2   < > 42.4   < >  --    MCV 99   < > 95   < >  --       < > 253   < >  --    IRON  --   --   --   --  46   B12  --   --  987  --   --     < > = values in this interval not displayed.     Magnesium   Date Value Ref Range Status   04/03/2025 2.0 1.7 - 2.3 mg/dL Final     Vitamin D Deficiency Screening Results:  Lab Results   Component Value Date    VITDT 26 04/03/2025    VITDT 49 02/14/2017    VITDT 45 11/26/2013        ASSESSMENT/PLAN:     (R53.81) Physical deconditioning  (primary encounter diagnosis)  (M62.81) Generalized muscle weakness  Comment: Acute on chronic. S/T below diagnosis. Per therapy- she requires Christina for UB needs, ModA for LB needs and toileting. Ambulates with 2WW up to 30 feet ModA. Christina for sit to stand transfers.   Plan:   -Continue Physical therapy and Occupational therapy as directed  -SW to remain involved for safe discharge planning needs     (Z91.81) Personal history of fall  (G89.4) Chronic pain syndrome  (G83.4) Cauda equina compression (H)  (M48.00) Spinal stenosis, unspecified spinal region  Comment: Acute on chronic. Per hospital records-presenting for leg pain and weakness following a fall. She is clinically stable. She has a known history of lumbar spinal stenosis as well as previously demonstrated L5 nerve impingement. By her report, she has had a chronic worsening of her functional status over time. The lower extremity weakness today appeared to precede her fall. There appears to have been some progression of lumbar spinal canal stenosis compared to the MRI from  9/25/24.   Plan:   -Monitor pain complaints as directed  -Follow up with neurosurgery as directed   -Continue vitamin D 2000U daily  -Continue lyrica 100mg TID (max dose for CrCl)  -Continue Tylenol 1000mg BID and BID PRN  -Continue oxycodone 5mg every 6 hours PRN.   -CMP and CBC due 4/14/25--results pending  -BMP and CBC due 4/23/25     (I10) Essential hypertension  (E66.812,  E66.01) Class 2 severe obesity with body mass index (BMI) of 35 to 39.9 with serious comorbidity (H)  (E78.5) Dyslipidemia  (I89.0) Lymphedema  (N18.31) Stage 3a chronic kidney disease (H)  Comment: Chronic. Based on JNC-8 goals,  patients age of 84 year old, presence of diabetes or CKD, and goals of care goal BP is  <140/90 mm Hg. Noted patients BP is higher than goal and will adjust plan of care by ..  Plan:   -Monitor BP and HR  -Continue rosuvastatin 10mg daily  -Continue TG compression stockings to bilateral lower extremities daily. On in Am and off at HS  -Start amlodipine 5mg daily. HOLD if SBP<100  -Continue triamterene-hydrochlorothiazide 37.5-25mg daily.. HOLD if SBP<100  -CMP and CBC due 4/14/25--results pending  -BMP and CBC due 4/23/25          (F41.9) Anxiety  (F32.A) Depression, unspecified depression type  (F31.9) Bipolar 1 disorder (H)  Comment: Chronic. Stable. I do not see any records from psych  Plan:   -Monitor mood and behaviors  -Monitor for worsening s/symptoms of concerns  -Monitor for changes in mobility, eating and sleeping patterns  -Continue buspirone 7.5mg BID  -Continue duloxetine 60mg daily  -Continue hydroxyzine 25mg BID  -Continue lamotrigine 100mg BID  -CMP and CBC due 4/14/25--results pending  -BMP and CBC due 4/23/25     (D64.9) Chronic anemia  Comment: Chronic. Baseline hgb~ 13  Plan:   -Monitor for bleeding risks  -Monitor for worsening s/symptoms of concerns  -CMP and CBC due 4/14/25--results pending  -BMP and CBC due 4/23/25     (H81.09) Meniere disease  Comment: Chronic. Followed with ENT--last visit  back in 2022  Plan:  -Monitor for worsening s/symptoms of concerns  -ENT post TCU  -Continue prednisone 6mg daily (appears to be on this chronically since 2017 or so)  -CMP and CBC due 4/14/25--results pending  -BMP and CBC due 4/23/25     (L30.9) Dermatitis  Comment: Acute on chronic. Admitted with wound care to area.   Plan:  -Encourage repositioning  -Monitor for worsening s/symptoms of concerns  -Continue calmoseptine to area BID.     (E11.21) Type 2 diabetes mellitus with diabetic nephropathy, without long-term current use of insulin (H)  Comment: Chronic. Last A1c yesterday 4/3/25 was 6.8%. Goal <9%.   Plan:   -HOLD Weekly ozempic while on TCU  -Monitor for worsening s/symptoms of concerns  -Continue Empaglifozin 25mg daily  -Discontinue glipizide due to hypoglycemia risks  -Continue metformin 500mg daily with breakfast  -Monitor Blood Glucose BID. Update if Blood Glucose <70 and/or >450  -CMP and CBC due 4/14/25--results pending  -BMP and CBC due 4/23/25          (J44.9) Chronic obstructive pulmonary disease, unspecified COPD type (H)  (J45.20) Mild intermittent asthma without complication  Comment: Chronic. Stable.   Plan:   -Monitor respiratory status  -Monitor for worsening s/symptoms of concerns  -CMP and CBC due 4/14/25--results pending  -BMP and CBC due 4/23/25     (K59.01) Constipation  Comment: Acute on chronic. Suspect S/T polypharmacy. Abdominal xray on site revealed moderate stool burden  Plan:  -Continue senna S to 1 tabs BID and BID PRN  -Continue miralax daily scheduled  -Bisacodyl 10mg rectal supp daily PRN  -Lactulose 20gm BID PRN  -Monitor BM patterns     (N32.81) OAB  Comment: Chronic.   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 It was reported of having fungal irritation  under breast this past weekend  Plan:  -Change miconazole powder to schedule BID  -Monitor for worsening s/symptoms of concerns    Electronically signed by:  Dr. Angeli Garcia DNP, APRN, FNP-C, WCS-C, EDS-C     Provider reviewed records from facility, and interpreted most recent imaging/lab work, and vital signs.   Acute and chronic conditions managed by writer. Have been reviewed during today's exam.

## 2025-04-13 ENCOUNTER — LAB REQUISITION (OUTPATIENT)
Dept: LAB | Facility: CLINIC | Age: 85
End: 2025-04-13
Payer: MEDICARE

## 2025-04-13 DIAGNOSIS — I10 ESSENTIAL (PRIMARY) HYPERTENSION: ICD-10-CM

## 2025-04-13 DIAGNOSIS — N18.30 CHRONIC KIDNEY DISEASE, STAGE 3 UNSPECIFIED (H): ICD-10-CM

## 2025-04-13 DIAGNOSIS — D64.9 ANEMIA, UNSPECIFIED: ICD-10-CM

## 2025-04-14 ENCOUNTER — TRANSITIONAL CARE UNIT VISIT (OUTPATIENT)
Dept: GERIATRICS | Facility: CLINIC | Age: 85
End: 2025-04-14
Payer: MEDICARE

## 2025-04-14 VITALS
TEMPERATURE: 98 F | HEART RATE: 78 BPM | HEIGHT: 66 IN | OXYGEN SATURATION: 97 % | RESPIRATION RATE: 18 BRPM | DIASTOLIC BLOOD PRESSURE: 81 MMHG | BODY MASS INDEX: 32.98 KG/M2 | SYSTOLIC BLOOD PRESSURE: 120 MMHG | WEIGHT: 205.2 LBS

## 2025-04-14 DIAGNOSIS — K59.01 SLOW TRANSIT CONSTIPATION: ICD-10-CM

## 2025-04-14 DIAGNOSIS — M62.81 GENERALIZED MUSCLE WEAKNESS: ICD-10-CM

## 2025-04-14 DIAGNOSIS — M48.00 SPINAL STENOSIS, UNSPECIFIED SPINAL REGION: ICD-10-CM

## 2025-04-14 DIAGNOSIS — D64.9 CHRONIC ANEMIA: ICD-10-CM

## 2025-04-14 DIAGNOSIS — E66.812 CLASS 2 SEVERE OBESITY WITH BODY MASS INDEX (BMI) OF 35 TO 39.9 WITH SERIOUS COMORBIDITY (H): ICD-10-CM

## 2025-04-14 DIAGNOSIS — R53.81 PHYSICAL DECONDITIONING: Primary | ICD-10-CM

## 2025-04-14 DIAGNOSIS — J45.20 MILD INTERMITTENT ASTHMA WITHOUT COMPLICATION: ICD-10-CM

## 2025-04-14 DIAGNOSIS — E11.21 TYPE 2 DIABETES MELLITUS WITH DIABETIC NEPHROPATHY, WITHOUT LONG-TERM CURRENT USE OF INSULIN (H): ICD-10-CM

## 2025-04-14 DIAGNOSIS — L85.3 DRY SKIN: ICD-10-CM

## 2025-04-14 DIAGNOSIS — E66.01 CLASS 2 SEVERE OBESITY WITH BODY MASS INDEX (BMI) OF 35 TO 39.9 WITH SERIOUS COMORBIDITY (H): ICD-10-CM

## 2025-04-14 DIAGNOSIS — Z91.81 PERSONAL HISTORY OF FALL: ICD-10-CM

## 2025-04-14 DIAGNOSIS — G83.4 CAUDA EQUINA COMPRESSION (H): ICD-10-CM

## 2025-04-14 DIAGNOSIS — L30.9 DERMATITIS: ICD-10-CM

## 2025-04-14 DIAGNOSIS — E78.5 DYSLIPIDEMIA: ICD-10-CM

## 2025-04-14 DIAGNOSIS — I89.0 LYMPHEDEMA: ICD-10-CM

## 2025-04-14 DIAGNOSIS — B36.9 FUNGAL DERMATITIS: ICD-10-CM

## 2025-04-14 DIAGNOSIS — F41.9 ANXIETY: ICD-10-CM

## 2025-04-14 DIAGNOSIS — N32.81 OAB (OVERACTIVE BLADDER): ICD-10-CM

## 2025-04-14 DIAGNOSIS — J44.9 CHRONIC OBSTRUCTIVE PULMONARY DISEASE, UNSPECIFIED COPD TYPE (H): ICD-10-CM

## 2025-04-14 DIAGNOSIS — F41.1 GAD (GENERALIZED ANXIETY DISORDER): ICD-10-CM

## 2025-04-14 DIAGNOSIS — R21 GROIN RASH: ICD-10-CM

## 2025-04-14 DIAGNOSIS — H81.09 MENIERE'S DISEASE, UNSPECIFIED LATERALITY: ICD-10-CM

## 2025-04-14 DIAGNOSIS — F32.A DEPRESSION, UNSPECIFIED DEPRESSION TYPE: ICD-10-CM

## 2025-04-14 DIAGNOSIS — G89.4 CHRONIC PAIN SYNDROME: ICD-10-CM

## 2025-04-14 DIAGNOSIS — F31.9 BIPOLAR 1 DISORDER (H): ICD-10-CM

## 2025-04-14 DIAGNOSIS — N18.31 STAGE 3A CHRONIC KIDNEY DISEASE (H): ICD-10-CM

## 2025-04-14 DIAGNOSIS — I10 ESSENTIAL HYPERTENSION: ICD-10-CM

## 2025-04-14 DIAGNOSIS — M48.061 SPINAL STENOSIS OF LUMBAR REGION, UNSPECIFIED WHETHER NEUROGENIC CLAUDICATION PRESENT: ICD-10-CM

## 2025-04-14 LAB
ALBUMIN SERPL BCG-MCNC: 3.9 G/DL (ref 3.5–5.2)
ALP SERPL-CCNC: 91 U/L (ref 40–150)
ALT SERPL W P-5'-P-CCNC: 35 U/L (ref 0–50)
ANION GAP SERPL CALCULATED.3IONS-SCNC: 12 MMOL/L (ref 7–15)
AST SERPL W P-5'-P-CCNC: 35 U/L (ref 0–45)
BASOPHILS # BLD AUTO: 0 10E3/UL (ref 0–0.2)
BASOPHILS NFR BLD AUTO: 0 %
BILIRUB SERPL-MCNC: 0.3 MG/DL
BUN SERPL-MCNC: 18 MG/DL (ref 8–23)
CALCIUM SERPL-MCNC: 9.2 MG/DL (ref 8.8–10.4)
CHLORIDE SERPL-SCNC: 101 MMOL/L (ref 98–107)
CREAT SERPL-MCNC: 0.98 MG/DL (ref 0.51–0.95)
EGFRCR SERPLBLD CKD-EPI 2021: 57 ML/MIN/1.73M2
EOSINOPHIL # BLD AUTO: 0.3 10E3/UL (ref 0–0.7)
EOSINOPHIL NFR BLD AUTO: 4 %
ERYTHROCYTE [DISTWIDTH] IN BLOOD BY AUTOMATED COUNT: 14.3 % (ref 10–15)
GLUCOSE SERPL-MCNC: 125 MG/DL (ref 70–99)
HCO3 SERPL-SCNC: 28 MMOL/L (ref 22–29)
HCT VFR BLD AUTO: 45.2 % (ref 35–47)
HGB BLD-MCNC: 13.6 G/DL (ref 11.7–15.7)
IMM GRANULOCYTES # BLD: 0.1 10E3/UL
IMM GRANULOCYTES NFR BLD: 1 %
LYMPHOCYTES # BLD AUTO: 2.5 10E3/UL (ref 0.8–5.3)
LYMPHOCYTES NFR BLD AUTO: 33 %
MCH RBC QN AUTO: 29.6 PG (ref 26.5–33)
MCHC RBC AUTO-ENTMCNC: 30.1 G/DL (ref 31.5–36.5)
MCV RBC AUTO: 99 FL (ref 78–100)
MONOCYTES # BLD AUTO: 0.6 10E3/UL (ref 0–1.3)
MONOCYTES NFR BLD AUTO: 8 %
NEUTROPHILS # BLD AUTO: 4 10E3/UL (ref 1.6–8.3)
NEUTROPHILS NFR BLD AUTO: 54 %
NRBC # BLD AUTO: 0 10E3/UL
NRBC BLD AUTO-RTO: 0 /100
PLATELET # BLD AUTO: 202 10E3/UL (ref 150–450)
POTASSIUM SERPL-SCNC: 3.5 MMOL/L (ref 3.4–5.3)
PROT SERPL-MCNC: 6.2 G/DL (ref 6.4–8.3)
RBC # BLD AUTO: 4.59 10E6/UL (ref 3.8–5.2)
SODIUM SERPL-SCNC: 141 MMOL/L (ref 135–145)
WBC # BLD AUTO: 7.4 10E3/UL (ref 4–11)

## 2025-04-14 PROCEDURE — 85018 HEMOGLOBIN: CPT | Performed by: NURSE PRACTITIONER

## 2025-04-14 PROCEDURE — 36415 COLL VENOUS BLD VENIPUNCTURE: CPT | Mod: ORL | Performed by: NURSE PRACTITIONER

## 2025-04-14 PROCEDURE — 80053 COMPREHEN METABOLIC PANEL: CPT | Mod: ORL | Performed by: NURSE PRACTITIONER

## 2025-04-14 PROCEDURE — 99309 SBSQ NF CARE MODERATE MDM 30: CPT | Performed by: NURSE PRACTITIONER

## 2025-04-14 RX ORDER — AMLODIPINE BESYLATE 5 MG/1
5 TABLET ORAL DAILY
Qty: 30 TABLET | Refills: 1 | Status: SHIPPED | OUTPATIENT
Start: 2025-04-14

## 2025-04-14 NOTE — LETTER
4/14/2025      Jumana Yang  [address]  [place] [address]        [facility] GERIATRICS    Chief Complaint   Patient presents with     RECHECK     HPI:  Jumana Yang is a 84 year old  (1940), who is being seen today for an episodic care visit at: [facility] (TC[facility])([facility]) [[id number]]. Today's concern is: The primary encounter diagnosis was Physical deconditioning. Diagnoses of Generalized muscle weakness, Personal history of fall, Spinal stenosis, unspecified spinal region, Chronic pain syndrome, Cauda equina compression (H), Essential hypertension, Class 2 severe obesity with body mass index (BMI) of 35 to 39.9 with serious comorbidity (H), Dyslipidemia, Lymphedema, Stage 3a chronic kidney disease (H), Anxiety, Depression, unspecified depression type, Bipolar 1 disorder (H), Chronic anemia, Type 2 diabetes mellitus with diabetic nephropathy, without long-term current use of insulin (H), Chronic obstructive pulmonary disease, unspecified COPD type (H), OAB (overactive bladder), Mild intermittent asthma without complication, Meniere's disease, unspecified laterality, Dermatitis, Dry skin, Spinal stenosis of lumbar region, unspecified whether neurogenic claudication present, Slow transit constipation, JANIE (generalized anxiety disorder), Fungal dermatitis, and Groin rash were also pertinent to this visit.    Met with patient who was found sitting upright in wheelchair. She denies any chest pain, palpitations, shortness of breath, LOPEZ, lightheadedness, dizziness, or cough. Denies any abdominal discomfort. Denies N&V. Denies B&B concerns. Denies dysuria or frequency. Denies loose or constipation. Reports stools have now been on looser side due to increased dose of stool agents when she was severely constipated last week. Reports LBM on Saturday. Oral  intake fair. Sleeping well. Reports pain stable with current regimen. Anxiousness present, which I suspect is due  "to poor hearing loss.     BP Readings from Last 3 Encounters:   04/14/25 120/81   04/11/25 (!) 156/78   04/08/25 (!) 159/79     Wt Readings from Last 5 Encounters:   04/14/25 93.1 kg (205 lb 3.2 oz)   04/11/25 93.4 kg (206 lb)   04/08/25 93.4 kg (206 lb)   04/04/25 93 kg (205 lb)   04/02/25 95.5 kg (210 lb 9.6 oz)     Allergies, and PMH/PSH reviewed in EPIC today.  REVIEW OF SYSTEMS:  4 point ROS including Respiratory, CV, GI and , other than that noted in the HPI,  is negative    Objective:   /81   Pulse 78   Temp 98  F (36.7  C)   Resp 18   Ht 1.676 m (5' 6\")   Wt 93.1 kg (205 lb 3.2 oz)   LMP  (LMP Unknown)   SpO2 97%   BMI 33.12 kg/m    GENERAL APPEARANCE:  Alert, in no distress, anxious, cooperative  ENT:  Mouth and posterior oropharynx normal, moist mucous membranes, [place]  EYES:  EOM, conjunctivae, lids, pupils and irises normal  NECK:  No adenopathy,masses or thyromegaly  RESP:  respiratory effort and palpation of chest normal, lungs clear to auscultation , no respiratory distress  CV:  regular rate and rhythm, no murmur, rub, or gallop, no edema, peripheral edema 1+ in BLE  ABDOMEN:  normal bowel sounds, soft, nontender, no hepatosplenomegaly or other masses, no guarding or rebound  M/S:   Ambulates short distance with walker. Wheelchair for long distance needs  SKIN:  Inspection of skin and subcutaneous tissue baseline, redness to buttock  NEURO:   Cranial nerves 2-12 are normal tested and grossly at patient's baseline, no purposeful movement in upper and lower extremities  PSYCH:  oriented X 3, affect and mood normal, anxious    Most Recent 3 CBC's:  Recent Labs   Lab Test 04/14/25  1036 04/10/25  0820 04/03/25  0945   WBC 7.4 6.0 8.6   HGB 13.6 13.5 12.9   MCV 99 98 95    218 253     Most Recent 3 BMP's:  Recent Labs   Lab Test 04/14/25  1036 04/10/25  0820 04/03/25  0945    145 141   POTASSIUM 3.5 3.4 3.6   CHLORIDE 101 103 98   CO2 28 32* 31*   BUN 18.0 21.9 16.4   CR 0.98* " 1.02* 1.16*   ANIONGAP 12 10 12   MACY 9.2 9.7 10.1   * 78 215*     Most Recent 2 LFT's:  Recent Labs   Lab Test 04/14/25  1036 04/03/25  0945   AST 35 28   ALT 35 21   ALKPHOS 91 88   BILITOTAL 0.3 0.4     Most Recent Hemoglobin A1c:  Recent Labs   Lab Test 04/03/25  0945   A1C 6.9*     Most Recent Anemia Panel:  Recent Labs   Lab Test 04/14/25  1036 04/10/25  0820 04/03/25  0945 04/13/22  0559 04/10/22  1123   WBC 7.4   < > 8.6   < >  --    HGB 13.6   < > 12.9   < >  --    HCT 45.2   < > 42.4   < >  --    MCV 99   < > 95   < >  --       < > 253   < >  --    IRON  --   --   --   --  46   B12  --   --  987  --   --     < > = values in this interval not displayed.     Magnesium   Date Value Ref Range Status   04/03/2025 2.0 1.7 - 2.3 mg/dL Final     Vitamin D Deficiency Screening Results:  Lab Results   Component Value Date    VITDT 26 04/03/2025    VITDT 49 02/14/2017    VITDT 45 11/26/2013        ASSESSMENT/PLAN:     (R53.81) Physical deconditioning  (primary encounter diagnosis)  (M62.81) Generalized muscle weakness  Comment: Acute on chronic. S/T below diagnosis. Per therapy- she requires Christina for UB needs, ModA for LB needs and toileting. Ambulates with 2WW up to 30 feet ModA. Christina for sit to stand transfers.   Plan:   -Continue Physical therapy and Occupational therapy as directed  -SW to remain involved for safe discharge planning needs     (Z91.81) Personal history of fall  (G89.4) Chronic pain syndrome  (G83.4) Cauda equina compression (H)  (M48.00) Spinal stenosis, unspecified spinal region  Comment: Acute on chronic. Per hospital records-presenting for leg pain and weakness following a fall. She is clinically stable. She has a known history of lumbar spinal stenosis as well as previously demonstrated L5 nerve impingement. By her report, she has had a chronic worsening of her functional status over time. The lower extremity weakness today appeared to precede her fall. There appears to have been  some progression of lumbar spinal canal stenosis compared to the MRI from 9/25/24.   Plan:   -Monitor pain complaints as directed  -Follow up with neurosurgery as directed   -Continue vitamin D 2000U daily  -Continue lyrica 100mg TID (max dose for CrCl)  -Continue Tylenol 1000mg BID and BID PRN  -Continue oxycodone 5mg every 6 hours PRN.   -CMP and CBC due 4/14/25--results pending  -BMP and CBC due 4/23/25     (I10) Essential hypertension  (E66.812,  E66.01) Class 2 severe obesity with body mass index (BMI) of 35 to 39.9 with serious comorbidity (H)  (E78.5) Dyslipidemia  (I89.0) Lymphedema  (N18.31) Stage 3a chronic kidney disease (H)  Comment: Chronic. Based on JNC-8 goals,  patients age of 84 year old, presence of diabetes or CKD, and goals of care goal BP is  <140/90 mm Hg. Noted patients BP is higher than goal and will adjust plan of care by ..  Plan:   -Monitor BP and HR  -Continue rosuvastatin 10mg daily  -Continue TG compression stockings to bilateral lower extremities daily. On in Am and off at HS  -Start amlodipine 5mg daily. HOLD if SBP<100  -Continue triamterene-hydrochlorothiazide 37.5-25mg daily.. HOLD if SBP<100  -CMP and CBC due 4/14/25--results pending  -BMP and CBC due 4/23/25          (F41.9) Anxiety  (F32.A) Depression, unspecified depression type  (F31.9) Bipolar 1 disorder (H)  Comment: Chronic. Stable. I do not see any records from psych  Plan:   -Monitor mood and behaviors  -Monitor for worsening s/symptoms of concerns  -Monitor for changes in mobility, eating and sleeping patterns  -Continue buspirone 7.5mg BID  -Continue duloxetine 60mg daily  -Continue hydroxyzine 25mg BID  -Continue lamotrigine 100mg BID  -CMP and CBC due 4/14/25--results pending  -BMP and CBC due 4/23/25     (D64.9) Chronic anemia  Comment: Chronic. Baseline hgb~ 13  Plan:   -Monitor for bleeding risks  -Monitor for worsening s/symptoms of concerns  -CMP and CBC due 4/14/25--results pending  -BMP and CBC due 4/23/25      (H81.09) Meniere disease  Comment: Chronic. Followed with ENT--last visit back in 2022  Plan:  -Monitor for worsening s/symptoms of concerns  -ENT post TCU  -Continue prednisone 6mg daily (appears to be on this chronically since 2017 or so)  -CMP and CBC due 4/14/25--results pending  -BMP and CBC due 4/23/25     (L30.9) Dermatitis  Comment: Acute on chronic. Admitted with wound care to area.   Plan:  -Encourage repositioning  -Monitor for worsening s/symptoms of concerns  -Continue calmoseptine to area BID.     (E11.21) Type 2 diabetes mellitus with diabetic nephropathy, without long-term current use of insulin (H)  Comment: Chronic. Last A1c yesterday 4/3/25 was 6.8%. Goal <9%.   Plan:   -HOLD Weekly ozempic while on TCU  -Monitor for worsening s/symptoms of concerns  -Continue Empaglifozin 25mg daily  -Discontinue glipizide due to hypoglycemia risks  -Continue metformin 500mg daily with breakfast  -Monitor Blood Glucose BID. Update if Blood Glucose <70 and/or >450  -CMP and CBC due 4/14/25--results pending  -BMP and CBC due 4/23/25          (J44.9) Chronic obstructive pulmonary disease, unspecified COPD type (H)  (J45.20) Mild intermittent asthma without complication  Comment: Chronic. Stable.   Plan:   -Monitor respiratory status  -Monitor for worsening s/symptoms of concerns  -CMP and CBC due 4/14/25--results pending  -BMP and CBC due 4/23/25     (K59.01) Constipation  Comment: Acute on chronic. Suspect S/T polypharmacy. Abdominal xray on site revealed moderate stool burden  Plan:  -Continue senna S to 1 tabs BID and BID PRN  -Continue miralax daily scheduled  -Bisacodyl 10mg rectal supp daily PRN  -Lactulose 20gm BID PRN  -Monitor BM patterns     (N32.81) OAB  Comment: Chronic.   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 It was reported of having fungal irritation under breast this past weekend  Plan:  -Change miconazole powder to schedule BID  -Monitor for worsening s/symptoms of concerns    Electronically signed by:  Dr. Angeli Garcia DNP, APRN, FNP-C, WCS-C, EDS-C     Provider reviewed records from facility, and interpreted most recent imaging/lab work, and vital signs.   Acute and chronic conditions managed by writer. Have been reviewed during today's exam.          Sincerely,        JUAN ANTONIO Barton CNP    Electronically signed

## 2025-04-15 NOTE — PROGRESS NOTES
[facility] GERIATRICS    Chief Complaint   Patient presents with    RECHECK     HPI:  Jumana Yang is a 84 year old  (1940), who is being seen today for an episodic care visit at: [facility] (U)([facility]) [[id number]]. Today's concern is: The primary encounter diagnosis was Physical deconditioning. Diagnoses of Personal history of fall, Generalized muscle weakness, Chronic pain syndrome, Spinal stenosis, unspecified spinal region, Essential hypertension, Cauda equina compression (H), Class 2 severe obesity with body mass index (BMI) of 35 to 39.9 with serious comorbidity (H), Dyslipidemia, Lymphedema, Stage 3a chronic kidney disease (H), Anxiety, Meniere's disease, unspecified laterality, Chronic anemia, Depression, unspecified depression type, Bipolar 1 disorder (H), Type 2 diabetes mellitus with diabetic nephropathy, without long-term current use of insulin (H), Chronic obstructive pulmonary disease, unspecified COPD type (H), OAB (overactive bladder), Mild intermittent asthma without complication, Dermatitis, Dry skin, Spinal stenosis of lumbar region, unspecified whether neurogenic claudication present, Slow transit constipation, JANIE (generalized anxiety disorder), and Fungal dermatitis were also pertinent to this visit.    Met with patient who was found sitting upright in wheelchair. She is very [place] and does well with written communication or by mouth reading words slowly. She denies any chest pain, palpitations, shortness of breath, LOPEZ, lightheadedness, dizziness, or cough. Reports some abdominal pressure and gas. Reports constipation present with LBM 4 days ago per her statements, however staff documented having 2 medium stools on Monday 4/14. Denies N&V. Denies dysuria or frequency. Appetite good. Sleeping well.     I educated patient on importance of reducing or even eliminating the use of PRN oxycodone as I suspect this is contributing to her constipation concerns. She  "reports only taking it 1-2x per day. I advised to utilize the Tylenol for now. Will continue to have PRN oxycodone available but recommended to GDR while on TCU.     BP Readings from Last 3 Encounters:   04/16/25 124/69   04/14/25 120/81   04/11/25 (!) 156/78     Wt Readings from Last 5 Encounters:   04/16/25 93.1 kg (205 lb 3.2 oz)   04/14/25 93.1 kg (205 lb 3.2 oz)   04/11/25 93.4 kg (206 lb)   04/08/25 93.4 kg (206 lb)   04/04/25 93 kg (205 lb)     Allergies, and PMH/PSH reviewed in EPIC today.  REVIEW OF SYSTEMS:  4 point ROS including Respiratory, CV, GI and , other than that noted in the HPI,  is negative    Objective:   /69   Pulse 83   Temp 97.5  F (36.4  C)   Resp 18   Ht 1.676 m (5' 6\")   Wt 93.1 kg (205 lb 3.2 oz)   LMP  (LMP Unknown)   SpO2 97%   BMI 33.12 kg/m    GENERAL APPEARANCE:  Alert, in no distress, oriented, anxious, cooperative  ENT:  Mouth and posterior oropharynx normal, moist mucous membranes, [place]  EYES:  EOM, conjunctivae, lids, pupils and irises normal  NECK:  No adenopathy,masses or thyromegaly  RESP:  respiratory effort and palpation of chest normal, lungs clear to auscultation , no respiratory distress  CV:  regular rate and rhythm, no murmur, rub, or gallop, peripheral edema 1+ in BLE  ABDOMEN:  normal bowel sounds, soft, nontender, no hepatosplenomegaly or other masses, mild distension  M/S:   Ambulates short distance with walker. Wheelchair for long distance needs  SKIN:  Inspection of skin and subcutaneous tissue baseline, Palpation of skin and subcutaneous tissue baseline  NEURO:   Cranial nerves 2-12 are normal tested and grossly at patient's baseline, no purposeful movement in upper and lower extremities  PSYCH:  oriented X 3, memory impaired , affect and mood normal, anxious    Most Recent 3 CBC's:  Recent Labs   Lab Test 04/14/25  1036 04/10/25  0820 04/03/25  0945   WBC 7.4 6.0 8.6   HGB 13.6 13.5 12.9   MCV 99 98 95    218 253     Most Recent 3 " BMP's:  Recent Labs   Lab Test 04/14/25  1036 04/10/25  0820 04/03/25  0945    145 141   POTASSIUM 3.5 3.4 3.6   CHLORIDE 101 103 98   CO2 28 32* 31*   BUN 18.0 21.9 16.4   CR 0.98* 1.02* 1.16*   ANIONGAP 12 10 12   MACY 9.2 9.7 10.1   * 78 215*     Most Recent 2 LFT's:  Recent Labs   Lab Test 04/14/25  1036 04/03/25  0945   AST 35 28   ALT 35 21   ALKPHOS 91 88   BILITOTAL 0.3 0.4     Most Recent Hemoglobin A1c:  Recent Labs   Lab Test 04/03/25  0945   A1C 6.9*     Most Recent Anemia Panel:  Recent Labs   Lab Test 04/14/25  1036 04/10/25  0820 04/03/25  0945 04/13/22  0559 04/10/22  1123   WBC 7.4   < > 8.6   < >  --    HGB 13.6   < > 12.9   < >  --    HCT 45.2   < > 42.4   < >  --    MCV 99   < > 95   < >  --       < > 253   < >  --    IRON  --   --   --   --  46   B12  --   --  987  --   --     < > = values in this interval not displayed.       ASSESSMENT/PLAN:     (R53.81) Physical deconditioning  (primary encounter diagnosis)  (M62.81) Generalized muscle weakness  Comment: Acute on chronic. S/T below diagnosis. Per therapy- she requires set up for UB needs, ModA for LB needs and toileting. Ambulates with 2WW up to 50 feet ModA. Christina for sit to stand transfers. SLUMS 20/30  Plan:   -Continue Physical therapy and Occupational therapy as directed  -SW to remain involved for safe discharge planning needs  -Recommend [facility] placement post TCU if accepted.      (Z91.81) Personal history of fall  (G89.4) Chronic pain syndrome  (G83.4) Cauda equina compression (H)  (M48.00) Spinal stenosis, unspecified spinal region  Comment: Acute on chronic. Per hospital records-presenting for leg pain and weakness following a fall. She is clinically stable. She has a known history of lumbar spinal stenosis as well as previously demonstrated L5 nerve impingement. By her report, she has had a chronic worsening of her functional status over time. The lower extremity weakness today appeared to precede her fall. There  appears to have been some progression of lumbar spinal canal stenosis compared to the MRI from 9/25/24.   Plan:   -Monitor pain complaints as directed  -Follow up with neurosurgery as directed   -Continue vitamin D 2000U daily  -Continue lyrica 100mg TID (max dose for CrCl)  -Continue Tylenol 1000mg BID and BID PRN  -Continue oxycodone 5mg every 6 hours PRN. Recommend GDR prior to discharge. Advised to limit use  -BMP and CBC due 4/23/25     (I10) Essential hypertension  (E66.812,  E66.01) Class 2 severe obesity with body mass index (BMI) of 35 to 39.9 with serious comorbidity (H)  (E78.5) Dyslipidemia  (I89.0) Lymphedema  (N18.31) Stage 3a chronic kidney disease (H)  Comment: Chronic. Based on JNC-8 goals,  patients age of 84 year old, presence of diabetes or CKD, and goals of care goal BP is  <140/90 mm Hg. Patient is stable with current plan of care and routine assessment..  Plan:   -Monitor BP and HR  -Continue rosuvastatin 10mg daily  -Continue TG compression stockings to bilateral lower extremities daily. On in Am and off at HS  -Continue amlodipine 5mg daily. HOLD if SBP<100  -Continue triamterene-hydrochlorothiazide 37.5-25mg daily.. HOLD if SBP<100  -BMP and CBC due 4/23/25          (F41.9) Anxiety  (F32.A) Depression, unspecified depression type  (F31.9) Bipolar 1 disorder (H)  Comment: Chronic. Stable. I do not see any records from psych. UMS 20/30  Plan:   -Monitor mood and behaviors  -Monitor for worsening s/symptoms of concerns  -Monitor for changes in mobility, eating and sleeping patterns  -Continue buspirone 7.5mg BID  -Continue duloxetine 60mg daily  -Continue hydroxyzine 25mg BID  -Continue lamotrigine 100mg BID  -BMP and CBC due 4/23/25     (D64.9) Chronic anemia  Comment: Chronic. Baseline hgb~ 13  Plan:   -Monitor for bleeding risks  -Monitor for worsening s/symptoms of concerns  -BMP and CBC due 4/23/25     (H81.09) Meniere disease  Comment: Chronic. Followed with ENT--last visit back in  2022  Plan:  -Monitor for worsening s/symptoms of concerns  -ENT post TCU  -Continue prednisone 6mg daily (appears to be on this chronically since 2017 or so)  -BMP and CBC due 4/23/25     (L30.9) Dermatitis  Comment: Acute on chronic. Admitted with wound care to area.   Plan:  -Encourage repositioning  -Monitor for worsening s/symptoms of concerns  -Continue calmoseptine to area BID.     (E11.21) Type 2 diabetes mellitus with diabetic nephropathy, without long-term current use of insulin (H)  Comment: Chronic. Last A1c yesterday 4/3/25 was 6.8%. Goal <9%.   Plan:   -HOLD Weekly ozempic while on TCU  -Monitor for worsening s/symptoms of concerns  -Continue Empaglifozin 25mg daily  -Discontinued glipizide due to hypoglycemia risks  -Continue metformin 500mg daily with breakfast  -Monitor Blood Glucose BID. Update if Blood Glucose <70 and/or >450  -BMP and CBC due 4/23/25          (J44.9) Chronic obstructive pulmonary disease, unspecified COPD type (H)  (J45.20) Mild intermittent asthma without complication  Comment: Chronic. Stable.   Plan:   -Monitor respiratory status  -Monitor for worsening s/symptoms of concerns  -BMP and CBC due 4/23/25     (K59.01) Constipation  Comment: Acute on chronic. Suspect S/T polypharmacy. Abdominal xray on site revealed moderate stool burden. Reports some abdominal pressure and gas. Reports constipation present with LBM 4 days ago per her statements, however staff documented having 2 medium stools on Monday 4/14. Denies N&V. I educated patient on importance of reducing or even eliminating the use of PRN oxycodone as I suspect this is contributing to her constipation concerns. She reports only taking it 1-2x per day. I advised to utilize the Tylenol for now. Will continue to have PRN oxycodone available but recommended to GDR while on TCU.   Plan:  -Continue senna S 2 tabs BID  -Continue miralax daily scheduled  -Bisacodyl 10mg rectal supp daily PRN  -Lactulose 20gm BID PRN--give dose  after lunch today  -Attempt to limit PRN oxycodone  -Fleet enema tonight x 1 if no BM  -Continue adequate hydration and ambulation practices.   -Monitor BM patterns     (N32.81) OAB  Comment: Chronic. 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 It was reported of having fungal irritation under breast this past weekend  Plan:  -Continue miconazole powder to schedule BID  -Monitor for worsening s/symptoms of concerns     Electronically signed by:  Dr. Angeli Garcia DNP, APRN, FNP-C, WCS-C, EDS-C     Provider reviewed records from facility, and interpreted most recent imaging/lab work, and vital signs.   Acute and chronic conditions managed by writer. Have been reviewed during today's exam.

## 2025-04-16 ENCOUNTER — TRANSITIONAL CARE UNIT VISIT (OUTPATIENT)
Dept: GERIATRICS | Facility: CLINIC | Age: 85
End: 2025-04-16
Payer: MEDICARE

## 2025-04-16 VITALS
HEIGHT: 66 IN | DIASTOLIC BLOOD PRESSURE: 69 MMHG | BODY MASS INDEX: 32.98 KG/M2 | WEIGHT: 205.2 LBS | TEMPERATURE: 97.5 F | RESPIRATION RATE: 18 BRPM | OXYGEN SATURATION: 97 % | HEART RATE: 83 BPM | SYSTOLIC BLOOD PRESSURE: 124 MMHG

## 2025-04-16 DIAGNOSIS — M48.00 SPINAL STENOSIS, UNSPECIFIED SPINAL REGION: ICD-10-CM

## 2025-04-16 DIAGNOSIS — D64.9 CHRONIC ANEMIA: ICD-10-CM

## 2025-04-16 DIAGNOSIS — N32.81 OAB (OVERACTIVE BLADDER): ICD-10-CM

## 2025-04-16 DIAGNOSIS — L30.9 DERMATITIS: ICD-10-CM

## 2025-04-16 DIAGNOSIS — N18.31 STAGE 3A CHRONIC KIDNEY DISEASE (H): ICD-10-CM

## 2025-04-16 DIAGNOSIS — J45.20 MILD INTERMITTENT ASTHMA WITHOUT COMPLICATION: ICD-10-CM

## 2025-04-16 DIAGNOSIS — Z91.81 PERSONAL HISTORY OF FALL: ICD-10-CM

## 2025-04-16 DIAGNOSIS — F41.9 ANXIETY: ICD-10-CM

## 2025-04-16 DIAGNOSIS — G89.4 CHRONIC PAIN SYNDROME: ICD-10-CM

## 2025-04-16 DIAGNOSIS — E66.01 CLASS 2 SEVERE OBESITY WITH BODY MASS INDEX (BMI) OF 35 TO 39.9 WITH SERIOUS COMORBIDITY (H): ICD-10-CM

## 2025-04-16 DIAGNOSIS — E78.5 DYSLIPIDEMIA: ICD-10-CM

## 2025-04-16 DIAGNOSIS — E66.812 CLASS 2 SEVERE OBESITY WITH BODY MASS INDEX (BMI) OF 35 TO 39.9 WITH SERIOUS COMORBIDITY (H): ICD-10-CM

## 2025-04-16 DIAGNOSIS — E11.21 TYPE 2 DIABETES MELLITUS WITH DIABETIC NEPHROPATHY, WITHOUT LONG-TERM CURRENT USE OF INSULIN (H): ICD-10-CM

## 2025-04-16 DIAGNOSIS — F32.A DEPRESSION, UNSPECIFIED DEPRESSION TYPE: ICD-10-CM

## 2025-04-16 DIAGNOSIS — K59.01 SLOW TRANSIT CONSTIPATION: ICD-10-CM

## 2025-04-16 DIAGNOSIS — I89.0 LYMPHEDEMA: ICD-10-CM

## 2025-04-16 DIAGNOSIS — F31.9 BIPOLAR 1 DISORDER (H): ICD-10-CM

## 2025-04-16 DIAGNOSIS — B36.9 FUNGAL DERMATITIS: ICD-10-CM

## 2025-04-16 DIAGNOSIS — F41.1 GAD (GENERALIZED ANXIETY DISORDER): ICD-10-CM

## 2025-04-16 DIAGNOSIS — M62.81 GENERALIZED MUSCLE WEAKNESS: ICD-10-CM

## 2025-04-16 DIAGNOSIS — J44.9 CHRONIC OBSTRUCTIVE PULMONARY DISEASE, UNSPECIFIED COPD TYPE (H): ICD-10-CM

## 2025-04-16 DIAGNOSIS — G83.4 CAUDA EQUINA COMPRESSION (H): ICD-10-CM

## 2025-04-16 DIAGNOSIS — H81.09 MENIERE'S DISEASE, UNSPECIFIED LATERALITY: ICD-10-CM

## 2025-04-16 DIAGNOSIS — M48.061 SPINAL STENOSIS OF LUMBAR REGION, UNSPECIFIED WHETHER NEUROGENIC CLAUDICATION PRESENT: ICD-10-CM

## 2025-04-16 DIAGNOSIS — L85.3 DRY SKIN: ICD-10-CM

## 2025-04-16 DIAGNOSIS — I10 ESSENTIAL HYPERTENSION: ICD-10-CM

## 2025-04-16 DIAGNOSIS — R53.81 PHYSICAL DECONDITIONING: Primary | ICD-10-CM

## 2025-04-16 PROCEDURE — 99309 SBSQ NF CARE MODERATE MDM 30: CPT | Performed by: NURSE PRACTITIONER

## 2025-04-16 RX ORDER — SODIUM PHOSPHATE,MONO-DIBASIC 19G-7G/118
1 ENEMA (ML) RECTAL ONCE
Qty: 133 ML | Refills: 0 | Status: SHIPPED | OUTPATIENT
Start: 2025-04-16 | End: 2025-04-16

## 2025-04-16 NOTE — LETTER
4/16/2025      Jumana Yang  [address]  [place] [address]        [facility] GERIATRICS    Chief Complaint   Patient presents with     RECHECK     HPI:  Jumana Yang is a 84 year old  (1940), who is being seen today for an episodic care visit at: [facility] ([facility])([facility]) [[id number]]. Today's concern is: The primary encounter diagnosis was Physical deconditioning. Diagnoses of Personal history of fall, Generalized muscle weakness, Chronic pain syndrome, Spinal stenosis, unspecified spinal region, Essential hypertension, Cauda equina compression (H), Class 2 severe obesity with body mass index (BMI) of 35 to 39.9 with serious comorbidity (H), Dyslipidemia, Lymphedema, Stage 3a chronic kidney disease (H), Anxiety, Meniere's disease, unspecified laterality, Chronic anemia, Depression, unspecified depression type, Bipolar 1 disorder (H), Type 2 diabetes mellitus with diabetic nephropathy, without long-term current use of insulin (H), Chronic obstructive pulmonary disease, unspecified COPD type (H), OAB (overactive bladder), Mild intermittent asthma without complication, Dermatitis, Dry skin, Spinal stenosis of lumbar region, unspecified whether neurogenic claudication present, Slow transit constipation, JANIE (generalized anxiety disorder), and Fungal dermatitis were also pertinent to this visit.    Met with patient who was found sitting upright in wheelchair. She is very [place] and does well with written communication or by mouth reading words slowly. She denies any chest pain, palpitations, shortness of breath, LOPEZ, lightheadedness, dizziness, or cough. Reports some abdominal pressure and gas. Reports constipation present with LBM 4 days ago per her statements, however staff documented having 2 medium stools on Monday 4/14. Denies N&V. Denies dysuria or frequency. Appetite good. Sleeping well.     I educated patient on importance of reducing or even eliminating the  "use of PRN oxycodone as I suspect this is contributing to her constipation concerns. She reports only taking it 1-2x per day. I advised to utilize the Tylenol for now. Will continue to have PRN oxycodone available but recommended to GDR while on TCU.     BP Readings from Last 3 Encounters:   04/16/25 124/69   04/14/25 120/81   04/11/25 (!) 156/78     Wt Readings from Last 5 Encounters:   04/16/25 93.1 kg (205 lb 3.2 oz)   04/14/25 93.1 kg (205 lb 3.2 oz)   04/11/25 93.4 kg (206 lb)   04/08/25 93.4 kg (206 lb)   04/04/25 93 kg (205 lb)     Allergies, and PMH/PSH reviewed in EPIC today.  REVIEW OF SYSTEMS:  4 point ROS including Respiratory, CV, GI and , other than that noted in the HPI,  is negative    Objective:   /69   Pulse 83   Temp 97.5  F (36.4  C)   Resp 18   Ht 1.676 m (5' 6\")   Wt 93.1 kg (205 lb 3.2 oz)   LMP  (LMP Unknown)   SpO2 97%   BMI 33.12 kg/m    GENERAL APPEARANCE:  Alert, in no distress, oriented, anxious, cooperative  ENT:  Mouth and posterior oropharynx normal, moist mucous membranes, [place]  EYES:  EOM, conjunctivae, lids, pupils and irises normal  NECK:  No adenopathy,masses or thyromegaly  RESP:  respiratory effort and palpation of chest normal, lungs clear to auscultation , no respiratory distress  CV:  regular rate and rhythm, no murmur, rub, or gallop, peripheral edema 1+ in BLE  ABDOMEN:  normal bowel sounds, soft, nontender, no hepatosplenomegaly or other masses, mild distension  M/S:   Ambulates short distance with walker. Wheelchair for long distance needs  SKIN:  Inspection of skin and subcutaneous tissue baseline, Palpation of skin and subcutaneous tissue baseline  NEURO:   Cranial nerves 2-12 are normal tested and grossly at patient's baseline, no purposeful movement in upper and lower extremities  PSYCH:  oriented X 3, memory impaired , affect and mood normal, anxious    Most Recent 3 CBC's:  Recent Labs   Lab Test 04/14/25  1036 04/10/25  0820 04/03/25  0945   WBC 7.4 " 6.0 8.6   HGB 13.6 13.5 12.9   MCV 99 98 95    218 253     Most Recent 3 BMP's:  Recent Labs   Lab Test 04/14/25  1036 04/10/25  0820 04/03/25  0945    145 141   POTASSIUM 3.5 3.4 3.6   CHLORIDE 101 103 98   CO2 28 32* 31*   BUN 18.0 21.9 16.4   CR 0.98* 1.02* 1.16*   ANIONGAP 12 10 12   MACY 9.2 9.7 10.1   * 78 215*     Most Recent 2 LFT's:  Recent Labs   Lab Test 04/14/25  1036 04/03/25  0945   AST 35 28   ALT 35 21   ALKPHOS 91 88   BILITOTAL 0.3 0.4     Most Recent Hemoglobin A1c:  Recent Labs   Lab Test 04/03/25  0945   A1C 6.9*     Most Recent Anemia Panel:  Recent Labs   Lab Test 04/14/25  1036 04/10/25  0820 04/03/25  0945 04/13/22  0559 04/10/22  1123   WBC 7.4   < > 8.6   < >  --    HGB 13.6   < > 12.9   < >  --    HCT 45.2   < > 42.4   < >  --    MCV 99   < > 95   < >  --       < > 253   < >  --    IRON  --   --   --   --  46   B12  --   --  987  --   --     < > = values in this interval not displayed.       ASSESSMENT/PLAN:     (R53.81) Physical deconditioning  (primary encounter diagnosis)  (M62.81) Generalized muscle weakness  Comment: Acute on chronic. S/T below diagnosis. Per therapy- she requires set up for UB needs, ModA for LB needs and toileting. Ambulates with 2WW up to 50 feet ModA. Christina for sit to stand transfers. SLUMS 20/30  Plan:   -Continue Physical therapy and Occupational therapy as directed  -SW to remain involved for safe discharge planning needs  -Recommend [facility] placement post TCU if accepted.      (Z91.81) Personal history of fall  (G89.4) Chronic pain syndrome  (G83.4) Cauda equina compression (H)  (M48.00) Spinal stenosis, unspecified spinal region  Comment: Acute on chronic. Per hospital records-presenting for leg pain and weakness following a fall. She is clinically stable. She has a known history of lumbar spinal stenosis as well as previously demonstrated L5 nerve impingement. By her report, she has had a chronic worsening of her functional status over  time. The lower extremity weakness today appeared to precede her fall. There appears to have been some progression of lumbar spinal canal stenosis compared to the MRI from 9/25/24.   Plan:   -Monitor pain complaints as directed  -Follow up with neurosurgery as directed   -Continue vitamin D 2000U daily  -Continue lyrica 100mg TID (max dose for CrCl)  -Continue Tylenol 1000mg BID and BID PRN  -Continue oxycodone 5mg every 6 hours PRN. Recommend GDR prior to discharge. Advised to limit use  -BMP and CBC due 4/23/25     (I10) Essential hypertension  (E66.812,  E66.01) Class 2 severe obesity with body mass index (BMI) of 35 to 39.9 with serious comorbidity (H)  (E78.5) Dyslipidemia  (I89.0) Lymphedema  (N18.31) Stage 3a chronic kidney disease (H)  Comment: Chronic. Based on JNC-8 goals,  patients age of 84 year old, presence of diabetes or CKD, and goals of care goal BP is  <140/90 mm Hg. Patient is stable with current plan of care and routine assessment..  Plan:   -Monitor BP and HR  -Continue rosuvastatin 10mg daily  -Continue TG compression stockings to bilateral lower extremities daily. On in Am and off at HS  -Continue amlodipine 5mg daily. HOLD if SBP<100  -Continue triamterene-hydrochlorothiazide 37.5-25mg daily.. HOLD if SBP<100  -BMP and CBC due 4/23/25          (F41.9) Anxiety  (F32.A) Depression, unspecified depression type  (F31.9) Bipolar 1 disorder (H)  Comment: Chronic. Stable. I do not see any records from psych. UMS 20/30  Plan:   -Monitor mood and behaviors  -Monitor for worsening s/symptoms of concerns  -Monitor for changes in mobility, eating and sleeping patterns  -Continue buspirone 7.5mg BID  -Continue duloxetine 60mg daily  -Continue hydroxyzine 25mg BID  -Continue lamotrigine 100mg BID  -BMP and CBC due 4/23/25     (D64.9) Chronic anemia  Comment: Chronic. Baseline hgb~ 13  Plan:   -Monitor for bleeding risks  -Monitor for worsening s/symptoms of concerns  -BMP and CBC due 4/23/25     (H81.09)  Meniere disease  Comment: Chronic. Followed with ENT--last visit back in 2022  Plan:  -Monitor for worsening s/symptoms of concerns  -ENT post TCU  -Continue prednisone 6mg daily (appears to be on this chronically since 2017 or so)  -BMP and CBC due 4/23/25     (L30.9) Dermatitis  Comment: Acute on chronic. Admitted with wound care to area.   Plan:  -Encourage repositioning  -Monitor for worsening s/symptoms of concerns  -Continue calmoseptine to area BID.     (E11.21) Type 2 diabetes mellitus with diabetic nephropathy, without long-term current use of insulin (H)  Comment: Chronic. Last A1c yesterday 4/3/25 was 6.8%. Goal <9%.   Plan:   -HOLD Weekly ozempic while on TCU  -Monitor for worsening s/symptoms of concerns  -Continue Empaglifozin 25mg daily  -Discontinued glipizide due to hypoglycemia risks  -Continue metformin 500mg daily with breakfast  -Monitor Blood Glucose BID. Update if Blood Glucose <70 and/or >450  -BMP and CBC due 4/23/25          (J44.9) Chronic obstructive pulmonary disease, unspecified COPD type (H)  (J45.20) Mild intermittent asthma without complication  Comment: Chronic. Stable.   Plan:   -Monitor respiratory status  -Monitor for worsening s/symptoms of concerns  -BMP and CBC due 4/23/25     (K59.01) Constipation  Comment: Acute on chronic. Suspect S/T polypharmacy. Abdominal xray on site revealed moderate stool burden. Reports some abdominal pressure and gas. Reports constipation present with LBM 4 days ago per her statements, however staff documented having 2 medium stools on Monday 4/14. Denies N&V. I educated patient on importance of reducing or even eliminating the use of PRN oxycodone as I suspect this is contributing to her constipation concerns. She reports only taking it 1-2x per day. I advised to utilize the Tylenol for now. Will continue to have PRN oxycodone available but recommended to GDR while on TCU.   Plan:  -Continue senna S 2 tabs BID  -Continue miralax daily  scheduled  -Bisacodyl 10mg rectal supp daily PRN  -Lactulose 20gm BID PRN--give dose after lunch today  -Attempt to limit PRN oxycodone  -Fleet enema tonight x 1 if no BM  -Continue adequate hydration and ambulation practices.   -Monitor BM patterns     (N32.81) OAB  Comment: Chronic. 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 It was reported of having fungal irritation under breast this past weekend  Plan:  -Continue miconazole powder to schedule BID  -Monitor for worsening s/symptoms of concerns     Electronically signed by:  Dr. Angeli Garcia DNP, APRN, FNP-C, WCS-C, EDS-C     Provider reviewed records from facility, and interpreted most recent imaging/lab work, and vital signs.   Acute and chronic conditions managed by writer. Have been reviewed during today's exam.      Sincerely,        JUAN ANTONIO Barton CNP    Electronically signed

## 2025-04-22 ENCOUNTER — LAB REQUISITION (OUTPATIENT)
Dept: LAB | Facility: CLINIC | Age: 85
End: 2025-04-22
Payer: MEDICARE

## 2025-04-22 VITALS
OXYGEN SATURATION: 94 % | RESPIRATION RATE: 18 BRPM | TEMPERATURE: 98.1 F | DIASTOLIC BLOOD PRESSURE: 82 MMHG | HEIGHT: 66 IN | BODY MASS INDEX: 32.85 KG/M2 | HEART RATE: 82 BPM | WEIGHT: 204.4 LBS | SYSTOLIC BLOOD PRESSURE: 139 MMHG

## 2025-04-22 DIAGNOSIS — I15.1 HYPERTENSION SECONDARY TO OTHER RENAL DISORDERS: ICD-10-CM

## 2025-04-22 DIAGNOSIS — N18.30 CHRONIC KIDNEY DISEASE, STAGE 3 UNSPECIFIED (H): ICD-10-CM

## 2025-04-22 DIAGNOSIS — D64.9 ANEMIA, UNSPECIFIED: ICD-10-CM

## 2025-04-22 NOTE — PROGRESS NOTES
[facility] GERIATRICS    Chief Complaint   Patient presents with    RECHECK     HPI:  Jumana Yang is a 84 year old  (1940), who is being seen today for an episodic care visit at: [facility] (TC[facility])([facility]) [[id number]]. Today's concern is: The primary encounter diagnosis was Physical deconditioning. Diagnoses of Personal history of fall, Generalized muscle weakness, Chronic pain syndrome, Spinal stenosis, unspecified spinal region, Essential hypertension, Cauda equina compression (H), Class 2 severe obesity with body mass index (BMI) of 35 to 39.9 with serious comorbidity (H), Dyslipidemia, Lymphedema, Stage 3a chronic kidney disease (H), Anxiety, Meniere's disease, unspecified laterality, Chronic anemia, Depression, unspecified depression type, Bipolar 1 disorder (H), Type 2 diabetes mellitus with diabetic nephropathy, without long-term current use of insulin (H), Chronic obstructive pulmonary disease, unspecified COPD type (H), OAB (overactive bladder), Mild intermittent asthma without complication, Dermatitis, Spinal stenosis of lumbar region, unspecified whether neurogenic claudication present, Slow transit constipation, JANIE (generalized anxiety disorder), Fungal dermatitis, Dry eyes, and Conjunctival hemorrhage of left eye were also pertinent to this visit.    Met with patient who was found sitting upright in wheelchair. She is very [place] and often communicates well with lip reading or by writing. She denies any chest pain, palpitations, shortness of breath, LOPEZ, lightheadedness, dizziness, or cough. Denies any abdominal discomfort. Denies N&V. Denies dysuria or frequency. Denies loose or constipation. Reports LBM yesterday which were soft. There are times where she refuses stool softeners. I did discuss goals of GDR off oxycodone prior to TCU discharge. Highy recommend this. Appetite good. Sleeping well. Reports pain is mostly first thing in the morning to low back.     BP  "Readings from Last 3 Encounters:   04/22/25 139/82   04/16/25 124/69   04/14/25 120/81     Wt Readings from Last 5 Encounters:   04/22/25 92.7 kg (204 lb 6.4 oz)   04/16/25 93.1 kg (205 lb 3.2 oz)   04/14/25 93.1 kg (205 lb 3.2 oz)   04/11/25 93.4 kg (206 lb)   04/08/25 93.4 kg (206 lb)     Allergies, and PMH/PSH reviewed in EPIC today.  REVIEW OF SYSTEMS:  4 point ROS including Respiratory, CV, GI and , other than that noted in the HPI,  is negative    Objective:   /82   Pulse 82   Temp 98.1  F (36.7  C)   Resp 18   Ht 1.676 m (5' 6\")   Wt 92.7 kg (204 lb 6.4 oz)   LMP  (LMP Unknown)   SpO2 94%   BMI 32.99 kg/m    GENERAL APPEARANCE:  Alert, in no distress, oriented, cooperative  ENT:  Mouth and posterior oropharynx normal, moist mucous membranes, [place]  EYES:  EOM, conjunctivae, lids, pupils and irises normal  NECK:  No adenopathy,masses or thyromegaly  RESP:  respiratory effort and palpation of chest normal, lungs clear to auscultation , no respiratory distress  CV:  regular rate and rhythm, no murmur, rub, or gallop, peripheral edema 1+ in BLE  ABDOMEN:  normal bowel sounds, soft, nontender, no hepatosplenomegaly or other masses, no guarding or rebound  M/S:   Ambulates with walker. Wheelchair for long distance needs  SKIN:  Inspection of skin and subcutaneous tissue baseline, Palpation of skin and subcutaneous tissue baseline  NEURO:   Cranial nerves 2-12 are normal tested and grossly at patient's baseline, no purposeful movement in upper and lower extremities  PSYCH:  oriented X 3, affect and mood normal      Most Recent 3 CBC's:  Recent Labs   Lab Test 04/23/25  1210 04/14/25  1036 04/10/25  0820   WBC 6.3 7.4 6.0   HGB 13.9 13.6 13.5   MCV 96 99 98    202 218     Most Recent 3 BMP's:  Recent Labs   Lab Test 04/23/25  1210 04/14/25  1036 04/10/25  0820    141 145   POTASSIUM 4.1 3.5 3.4   CHLORIDE 100 101 103   CO2 29 28 32*   BUN 19.9 18.0 21.9   CR 1.06* 0.98* 1.02*   ANIONGAP 13 " 12 10   MACY 9.7 9.2 9.7   * 125* 78     Most Recent 2 LFT's:  Recent Labs   Lab Test 04/14/25  1036 04/03/25  0945   AST 35 28   ALT 35 21   ALKPHOS 91 88   BILITOTAL 0.3 0.4     Most Recent Hemoglobin A1c:  Recent Labs   Lab Test 04/03/25  0945   A1C 6.9*     Most Recent Anemia Panel:  Recent Labs   Lab Test 04/23/25  1210 04/10/25  0820 04/03/25  0945 04/13/22  0559 04/10/22  1123   WBC 6.3   < > 8.6   < >  --    HGB 13.9   < > 12.9   < >  --    HCT 44.9   < > 42.4   < >  --    MCV 96   < > 95   < >  --       < > 253   < >  --    IRON  --   --   --   --  46   B12  --   --  987  --   --     < > = values in this interval not displayed.       ASSESSMENT/PLAN:     (R53.81) Physical deconditioning  (primary encounter diagnosis)  (M62.81) Generalized muscle weakness  Comment: Acute on chronic. S/T below diagnosis. Per therapy- she requires set up for UB needs, ModA for LB needs and toileting. Ambulates with 2WW up to 50 feet ModA. Christina for sit to stand transfers. [facility] 20/30  Plan:   -Continue Physical therapy and Occupational therapy as directed  -SW to remain involved for safe discharge planning needs  -Recommend [facility] placement post TCU if accepted.      (Z91.81) Personal history of fall  (G89.4) Chronic pain syndrome  (G83.4) Cauda equina compression (H)  (M48.00) Spinal stenosis, unspecified spinal region  Comment: Acute on chronic. Per hospital records-presenting for leg pain and weakness following a fall. She is clinically stable. She has a known history of lumbar spinal stenosis as well as previously demonstrated L5 nerve impingement. By her report, she has had a chronic worsening of her functional status over time. The lower extremity weakness today appeared to precede her fall. There appears to have been some progression of lumbar spinal canal stenosis compared to the MRI from 9/25/24.   Plan:   -Monitor pain complaints as directed  -Follow up with neurosurgery as directed   -Continue vitamin D  2000U daily  -Continue lyrica 100mg TID (max dose for CrCl)  -Increase Tylenol to 1000mg TID and daily PRN  -Change oxycodone to 5mg daily PRN x 2 weeks then discontinue  -BMP and CBC due 4/23/25--results pending  -Trend periodically while TCU     (I10) Essential hypertension  (E66.812,  E66.01) Class 2 severe obesity with body mass index (BMI) of 35 to 39.9 with serious comorbidity (H)  (E78.5) Dyslipidemia  (I89.0) Lymphedema  (N18.31) Stage 3a chronic kidney disease (H)  Comment: Chronic. Based on JNC-8 goals,  patients age of 84 year old, presence of diabetes or CKD, and goals of care goal BP is  <140/90 mm Hg. Patient is stable with current plan of care and routine assessment..  Plan:   -Monitor BP and HR  -Continue rosuvastatin 10mg daily  -Continue TG compression stockings to bilateral lower extremities daily. On in Am and off at HS  -Continue amlodipine 5mg daily. HOLD if SBP<100  -Continue triamterene-hydrochlorothiazide 37.5-25mg daily.. HOLD if SBP<100  -BMP and CBC due 4/23/25--results pending  -Trend periodically while TCU          (F41.9) Anxiety  (F32.A) Depression, unspecified depression type  (F31.9) Bipolar 1 disorder (H)  Comment: Chronic. Stable. I do not see any records from psych. [facility] 20/30  Plan:   -Monitor mood and behaviors  -Monitor for worsening s/symptoms of concerns  -Monitor for changes in mobility, eating and sleeping patterns  -Continue buspirone 7.5mg BID  -Continue duloxetine 60mg daily  -Continue hydroxyzine 25mg BID  -Continue lamotrigine 100mg BID  -BMP and CBC due 4/23/25--results pending  -Trend periodically while TCU     (D64.9) Chronic anemia  Comment: Chronic. Baseline hgb~ 13  Plan:   -Monitor for bleeding risks  -Monitor for worsening s/symptoms of concerns  -BMP and CBC due 4/23/25--results pending  -Trend periodically while TCU     (H81.09) Meniere disease  Comment: Chronic. Followed with ENT--last visit back in 2022  Plan:  -Monitor for worsening s/symptoms of  concerns  -ENT post TCU  -Continue prednisone 6mg daily (appears to be on this chronically since 2017 or so)  -BMP and CBC due 4/23/25--results pending  -Trend periodically while TCU     (L30.9) Dermatitis  Comment: Acute on chronic. Admitted with wound care to area.   Plan:  -Encourage repositioning  -Monitor for worsening s/symptoms of concerns  -Continue calmoseptine to area BID.     (E11.21) Type 2 diabetes mellitus with diabetic nephropathy, without long-term current use of insulin (H)  Comment: Chronic. Last A1c yesterday 4/3/25 was 6.8%. Goal <9%.   Plan:   -HOLD Weekly ozempic while on TCU  -Monitor for worsening s/symptoms of concerns  -Continue Empaglifozin 25mg daily  -Discontinued glipizide due to hypoglycemia risks  -Continue metformin 500mg daily with breakfast  -Monitor Blood Glucose BID. Update if Blood Glucose <70 and/or >450  -BMP and CBC due 4/23/25--results pending  -Trend periodically while TCU          (J44.9) Chronic obstructive pulmonary disease, unspecified COPD type (H)  (J45.20) Mild intermittent asthma without complication  Comment: Chronic. Stable.   Plan:   -Monitor respiratory status  -Monitor for worsening s/symptoms of concerns  -BMP and CBC due 4/23/25--results pending  -Trend periodically while TCU     (K59.01) Constipation  Comment: Acute on chronic. Suspect S/T polypharmacy. Abdominal xray on site revealed moderate stool burden.   Plan:  -Continue senna S 2 tabs BID  -Continue miralax daily scheduled  -Bisacodyl 10mg rectal supp daily PRN  -Lactulose 20gm BID PRN  -Attempt to limit PRN oxycodone  -Continue adequate hydration and ambulation practices.   -Monitor BM patterns     (N32.81) OAB  Comment: Chronic. Stable  Plan:  -Monitor urinary status  -Continue mirabegron 50mg daily  -Continue solenacin succinate 5mg daily  -Follow up with urology post TCU  -BMP and CBC due 4/23/25--results pending  -Trend periodically while TCU    (H04.123) Dry eyes  (H11.32) Conjunctival hemorrhage  "of left eye  Comment: Acute. She noted this few days ago. Suspect S/T constipation strain  Plan:  -Monitor for worsening s/symptoms of concerns  -Start refresh tears BID and PRN  -ENT referral if worsens.      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 It was reported of having fungal irritation under breast this past weekend  Plan:  -Continue miconazole powder BID  -Monitor for worsening s/symptoms of concerns     Electronically signed by:  Dr. Angeli Garcia DNP, APRN, FNP-C, WCS-C, EDS-C     Provider reviewed records from facility, and interpreted most recent imaging/lab work, and vital signs.   Acute and chronic conditions managed by writer. Have been reviewed during today's exam.      Face to Face and Medical Necessity Statement for DME Provider visit    Demographic Information on Jumana Yang:  Gender: female  : 1940  [address]   [place] [id number] [phone] (home) [phone] (work)    Medical Record: [id number]  Social Security Number: [id number]  Primary Care Provider: Terra Mathews  Insurance: Payor: MEDICARE / Plan: MEDICARE / Product Type: Medicare /     HPI:   Jumana Yang is a 84 year old  (1940), who is being seen today for a face to face provider visit at [facility]; medical necessity statement for DME included. This patient requires the following:  DME Ordered and Medical Necessity Statement     Hospital Bed: Fully electric  Mattress Type: Standard  Rail Type: half    Height:   Ht Readings from Last 2 Encounters:   25 1.676 m (5' 6\")   25 1.676 m (5' 6\")       Weight:   Wt Readings from Last 5 Encounters:   25 92.7 kg (204 lb 6.4 oz)   25 93.1 kg (205 lb 3.2 oz)   25 93.1 kg (205 lb 3.2 oz)   25 93.4 kg (206 lb)   25 93.4 kg (206 lb)     Patient has spinal stenosis with chronic pain and poor mobility which requires positioning of the body in ways not feasible with an ordinary bed. She needs bed due to needing " elevation of 30 degrees along with assistance of pain alleviation. She does require a bed height different than a fixed height hospital bed to permit transfers to chair, wheelchair, or standing position. Due to chronic pain and spinal stenosis concerns, patient does require frequent changes in body position and/or has an immediate need for change in body position. Patient requires change of bed height at least once per day to allow caregiver to assist with all the necessary cares in the bed and caregiver is unable to change the bed height manually.     Pt needing above DME with expected length of need of 99 year due to medical necessity associated with following diagnosis:     Physical deconditioning  Personal history of fall  Generalized muscle weakness  Chronic pain syndrome  Spinal stenosis, unspecified spinal region  Essential hypertension  Cauda equina compression (H)  Class 2 severe obesity with body mass index (BMI) of 35 to 39.9 with serious comorbidity (H)  Dyslipidemia  Lymphedema  Stage 3a chronic kidney disease (H)  Anxiety  Meniere's disease, unspecified laterality  Chronic anemia  Depression, unspecified depression type  Bipolar 1 disorder (H)  Type 2 diabetes mellitus with diabetic nephropathy, without long-term current use of insulin (H)  Chronic obstructive pulmonary disease, unspecified COPD type (H)  OAB (overactive bladder)  Mild intermittent asthma without complication  Dermatitis  Spinal stenosis of lumbar region, unspecified whether neurogenic claudication present  Slow transit constipation  JANIE (generalized anxiety disorder)  Fungal dermatitis  Dry eyes  Conjunctival hemorrhage of left eye      PMH   has a past medical history of Abdominal pain, Anxiety, Arthritis, Asthma, Bipolar 1 disorder (H), Chronic pain, Cochlear hydrops (01/01/1988), COPD (chronic obstructive pulmonary disease) (H), Depression, Diabetes mellitus (H), Dyspepsia, GERD (gastroesophageal reflux disease), Hard of hearing,  "Hepatic abscess, Hyperlipidemia, Hypertension, Hypothyroidism, Irritable bowel syndrome, Meniere disease, Neuropathy, Noninfectious ileitis, Peritoneal abscess (H), Spinal stenosis of lumbar region, Steroid long-term use, Vaginitis, atrophic, postmenopausal, and Vitamin D deficiency.    She has no past medical history of Blood clotting disorder.    ROS:4 point ROS including Respiratory, CV, GI and , other than that noted in the HPI,  is negative    EXAM  Vitals: /82   Pulse 82   Temp 98.1  F (36.7  C)   Resp 18   Ht 1.676 m (5' 6\")   Wt 92.7 kg (204 lb 6.4 oz)   LMP  (LMP Unknown)   SpO2 94%   BMI 32.99 kg/m  ;BMI= Body mass index is 32.99 kg/m .  GENERAL APPEARANCE:  Alert, in no distress, oriented, cooperative  ENT:  Mouth and posterior oropharynx normal, moist mucous membranes, [place]  EYES:  EOM, conjunctivae, lids, pupils and irises normal  NECK:  No adenopathy,masses or thyromegaly  RESP:  respiratory effort and palpation of chest normal, lungs clear to auscultation , no respiratory distress  CV:  regular rate and rhythm, no murmur, rub, or gallop, peripheral edema 1+ in BLE  ABDOMEN:  normal bowel sounds, soft, nontender, no hepatosplenomegaly or other masses, no guarding or rebound  M/S:   Ambulates with walker. Wheelchair for long distance needs  SKIN:  Inspection of skin and subcutaneous tissue baseline, Palpation of skin and subcutaneous tissue baseline  NEURO:   Cranial nerves 2-12 are normal tested and grossly at patient's baseline, no purposeful movement in upper and lower extremities  PSYCH:  oriented X 3, affect and mood normal     ASSESSMENT/PLAN:  1. Physical deconditioning    2. Personal history of fall    3. Generalized muscle weakness    4. Chronic pain syndrome    5. Spinal stenosis, unspecified spinal region    6. Essential hypertension    7. Cauda equina compression (H)    8. Class 2 severe obesity with body mass index (BMI) of 35 to 39.9 with serious comorbidity (H)    9. " Dyslipidemia    10. Lymphedema    11. Stage 3a chronic kidney disease (H)    12. Anxiety    13. Meniere's disease, unspecified laterality    14. Chronic anemia    15. Depression, unspecified depression type    16. Bipolar 1 disorder (H)    17. Type 2 diabetes mellitus with diabetic nephropathy, without long-term current use of insulin (H)    18. Chronic obstructive pulmonary disease, unspecified COPD type (H)    19. OAB (overactive bladder)    20. Mild intermittent asthma without complication    21. Dermatitis    22. Spinal stenosis of lumbar region, unspecified whether neurogenic claudication present    23. Slow transit constipation    24. JANIE (generalized anxiety disorder)    25. Fungal dermatitis    26. Dry eyes    27. Conjunctival hemorrhage of left eye        Orders:  1. Facility staff/TC to contact DME company to get their order form for provider to fill out    ELECTRONICALLY SIGNED BY CORRIE CERTIFIED PROVIDER:  JUAN ANTONIO Barton CNP   NPI: [id number]  [facility]  [address] [place] [address]

## 2025-04-23 ENCOUNTER — TRANSITIONAL CARE UNIT VISIT (OUTPATIENT)
Dept: GERIATRICS | Facility: CLINIC | Age: 85
End: 2025-04-23
Payer: MEDICARE

## 2025-04-23 DIAGNOSIS — B36.9 FUNGAL DERMATITIS: ICD-10-CM

## 2025-04-23 DIAGNOSIS — M62.81 GENERALIZED MUSCLE WEAKNESS: ICD-10-CM

## 2025-04-23 DIAGNOSIS — E66.812 CLASS 2 SEVERE OBESITY WITH BODY MASS INDEX (BMI) OF 35 TO 39.9 WITH SERIOUS COMORBIDITY (H): ICD-10-CM

## 2025-04-23 DIAGNOSIS — F41.1 GAD (GENERALIZED ANXIETY DISORDER): ICD-10-CM

## 2025-04-23 DIAGNOSIS — Z91.81 PERSONAL HISTORY OF FALL: ICD-10-CM

## 2025-04-23 DIAGNOSIS — H04.123 DRY EYES: ICD-10-CM

## 2025-04-23 DIAGNOSIS — G83.4 CAUDA EQUINA COMPRESSION (H): ICD-10-CM

## 2025-04-23 DIAGNOSIS — I89.0 LYMPHEDEMA: ICD-10-CM

## 2025-04-23 DIAGNOSIS — N32.81 OAB (OVERACTIVE BLADDER): ICD-10-CM

## 2025-04-23 DIAGNOSIS — E78.5 DYSLIPIDEMIA: ICD-10-CM

## 2025-04-23 DIAGNOSIS — J45.20 MILD INTERMITTENT ASTHMA WITHOUT COMPLICATION: ICD-10-CM

## 2025-04-23 DIAGNOSIS — D64.9 CHRONIC ANEMIA: ICD-10-CM

## 2025-04-23 DIAGNOSIS — F31.9 BIPOLAR 1 DISORDER (H): ICD-10-CM

## 2025-04-23 DIAGNOSIS — N18.31 STAGE 3A CHRONIC KIDNEY DISEASE (H): ICD-10-CM

## 2025-04-23 DIAGNOSIS — L30.9 DERMATITIS: ICD-10-CM

## 2025-04-23 DIAGNOSIS — J44.9 CHRONIC OBSTRUCTIVE PULMONARY DISEASE, UNSPECIFIED COPD TYPE (H): ICD-10-CM

## 2025-04-23 DIAGNOSIS — M48.061 SPINAL STENOSIS OF LUMBAR REGION, UNSPECIFIED WHETHER NEUROGENIC CLAUDICATION PRESENT: ICD-10-CM

## 2025-04-23 DIAGNOSIS — H11.32 CONJUNCTIVAL HEMORRHAGE OF LEFT EYE: ICD-10-CM

## 2025-04-23 DIAGNOSIS — R53.81 PHYSICAL DECONDITIONING: Primary | ICD-10-CM

## 2025-04-23 DIAGNOSIS — K59.01 SLOW TRANSIT CONSTIPATION: ICD-10-CM

## 2025-04-23 DIAGNOSIS — E66.01 CLASS 2 SEVERE OBESITY WITH BODY MASS INDEX (BMI) OF 35 TO 39.9 WITH SERIOUS COMORBIDITY (H): ICD-10-CM

## 2025-04-23 DIAGNOSIS — G89.4 CHRONIC PAIN SYNDROME: ICD-10-CM

## 2025-04-23 DIAGNOSIS — F41.9 ANXIETY: ICD-10-CM

## 2025-04-23 DIAGNOSIS — I10 ESSENTIAL HYPERTENSION: ICD-10-CM

## 2025-04-23 DIAGNOSIS — E11.21 TYPE 2 DIABETES MELLITUS WITH DIABETIC NEPHROPATHY, WITHOUT LONG-TERM CURRENT USE OF INSULIN (H): ICD-10-CM

## 2025-04-23 DIAGNOSIS — M48.00 SPINAL STENOSIS, UNSPECIFIED SPINAL REGION: ICD-10-CM

## 2025-04-23 DIAGNOSIS — H81.09 MENIERE'S DISEASE, UNSPECIFIED LATERALITY: ICD-10-CM

## 2025-04-23 DIAGNOSIS — F32.A DEPRESSION, UNSPECIFIED DEPRESSION TYPE: ICD-10-CM

## 2025-04-23 LAB
ANION GAP SERPL CALCULATED.3IONS-SCNC: 13 MMOL/L (ref 7–15)
BASOPHILS # BLD AUTO: 0 10E3/UL (ref 0–0.2)
BASOPHILS NFR BLD AUTO: 0 %
BUN SERPL-MCNC: 19.9 MG/DL (ref 8–23)
CALCIUM SERPL-MCNC: 9.7 MG/DL (ref 8.8–10.4)
CHLORIDE SERPL-SCNC: 100 MMOL/L (ref 98–107)
CREAT SERPL-MCNC: 1.06 MG/DL (ref 0.51–0.95)
EGFRCR SERPLBLD CKD-EPI 2021: 52 ML/MIN/1.73M2
EOSINOPHIL # BLD AUTO: 0.2 10E3/UL (ref 0–0.7)
EOSINOPHIL NFR BLD AUTO: 3 %
ERYTHROCYTE [DISTWIDTH] IN BLOOD BY AUTOMATED COUNT: 13.9 % (ref 10–15)
GLUCOSE SERPL-MCNC: 228 MG/DL (ref 70–99)
HCO3 SERPL-SCNC: 29 MMOL/L (ref 22–29)
HCT VFR BLD AUTO: 44.9 % (ref 35–47)
HGB BLD-MCNC: 13.9 G/DL (ref 11.7–15.7)
IMM GRANULOCYTES # BLD: 0 10E3/UL
IMM GRANULOCYTES NFR BLD: 1 %
LYMPHOCYTES # BLD AUTO: 1.2 10E3/UL (ref 0.8–5.3)
LYMPHOCYTES NFR BLD AUTO: 19 %
MCH RBC QN AUTO: 29.8 PG (ref 26.5–33)
MCHC RBC AUTO-ENTMCNC: 31 G/DL (ref 31.5–36.5)
MCV RBC AUTO: 96 FL (ref 78–100)
MONOCYTES # BLD AUTO: 0.5 10E3/UL (ref 0–1.3)
MONOCYTES NFR BLD AUTO: 8 %
NEUTROPHILS # BLD AUTO: 4.3 10E3/UL (ref 1.6–8.3)
NEUTROPHILS NFR BLD AUTO: 69 %
NRBC # BLD AUTO: 0 10E3/UL
NRBC BLD AUTO-RTO: 0 /100
PLATELET # BLD AUTO: 192 10E3/UL (ref 150–450)
POTASSIUM SERPL-SCNC: 4.1 MMOL/L (ref 3.4–5.3)
RBC # BLD AUTO: 4.67 10E6/UL (ref 3.8–5.2)
SODIUM SERPL-SCNC: 142 MMOL/L (ref 135–145)
WBC # BLD AUTO: 6.3 10E3/UL (ref 4–11)

## 2025-04-23 PROCEDURE — 36415 COLL VENOUS BLD VENIPUNCTURE: CPT | Mod: ORL | Performed by: NURSE PRACTITIONER

## 2025-04-23 PROCEDURE — 85041 AUTOMATED RBC COUNT: CPT | Performed by: NURSE PRACTITIONER

## 2025-04-23 PROCEDURE — 80048 BASIC METABOLIC PNL TOTAL CA: CPT | Mod: ORL | Performed by: NURSE PRACTITIONER

## 2025-04-23 PROCEDURE — 99309 SBSQ NF CARE MODERATE MDM 30: CPT | Performed by: NURSE PRACTITIONER

## 2025-04-23 RX ORDER — OXYCODONE HYDROCHLORIDE 5 MG/1
5 TABLET ORAL DAILY PRN
Status: SHIPPED
Start: 2025-04-23 | End: 2025-05-07

## 2025-04-23 RX ORDER — PREGABALIN 100 MG/1
100 CAPSULE ORAL 3 TIMES DAILY
Qty: 90 CAPSULE | Refills: 0 | Status: SHIPPED | OUTPATIENT
Start: 2025-04-23

## 2025-04-23 RX ORDER — ACETAMINOPHEN 500 MG
TABLET ORAL
Status: SHIPPED
Start: 2025-04-23

## 2025-04-23 NOTE — LETTER
4/23/2025      Jumana Yang  [address]  [place] [address]        [facility]    Chief Complaint   Patient presents with     RECHECK     HPI:  Jumana Yang is a 84 year old  (1940), who is being seen today for an episodic care visit at: [facility] ([facility])([facility]) [[id number]]. Today's concern is: The primary encounter diagnosis was Physical deconditioning. Diagnoses of Personal history of fall, Generalized muscle weakness, Chronic pain syndrome, Spinal stenosis, unspecified spinal region, Essential hypertension, Cauda equina compression (H), Class 2 severe obesity with body mass index (BMI) of 35 to 39.9 with serious comorbidity (H), Dyslipidemia, Lymphedema, Stage 3a chronic kidney disease (H), Anxiety, Meniere's disease, unspecified laterality, Chronic anemia, Depression, unspecified depression type, Bipolar 1 disorder (H), Type 2 diabetes mellitus with diabetic nephropathy, without long-term current use of insulin (H), Chronic obstructive pulmonary disease, unspecified COPD type (H), OAB (overactive bladder), Mild intermittent asthma without complication, Dermatitis, Spinal stenosis of lumbar region, unspecified whether neurogenic claudication present, Slow transit constipation, JANIE (generalized anxiety disorder), and Fungal dermatitis were also pertinent to this visit.    Met with patient who was found sitting upright in wheelchair. She is very [place] and often communicates well with lip reading or by writing. She denies any chest pain, palpitations, shortness of breath, LOPEZ, lightheadedness, dizziness, or cough. Denies any abdominal discomfort. Denies N&V. Denies dysuria or frequency. Denies loose or constipation. Reports LBM yesterday which were soft. There are times where she refuses stool softeners. I did discuss goals of GDR off oxycodone prior to TCU discharge. Highy recommend this. Appetite good. Sleeping well. Reports pain is mostly first  "thing in the morning to low back.     BP Readings from Last 3 Encounters:   04/22/25 139/82   04/16/25 124/69   04/14/25 120/81     Wt Readings from Last 5 Encounters:   04/22/25 92.7 kg (204 lb 6.4 oz)   04/16/25 93.1 kg (205 lb 3.2 oz)   04/14/25 93.1 kg (205 lb 3.2 oz)   04/11/25 93.4 kg (206 lb)   04/08/25 93.4 kg (206 lb)     Allergies, and PMH/PSH reviewed in EPIC today.  REVIEW OF SYSTEMS:  4 point ROS including Respiratory, CV, GI and , other than that noted in the HPI,  is negative    Objective:   /82   Pulse 82   Temp 98.1  F (36.7  C)   Resp 18   Ht 1.676 m (5' 6\")   Wt 92.7 kg (204 lb 6.4 oz)   LMP  (LMP Unknown)   SpO2 94%   BMI 32.99 kg/m    GENERAL APPEARANCE:  Alert, in no distress, oriented, cooperative  ENT:  Mouth and posterior oropharynx normal, moist mucous membranes, [place]  EYES:  EOM, conjunctivae, lids, pupils and irises normal  NECK:  No adenopathy,masses or thyromegaly  RESP:  respiratory effort and palpation of chest normal, lungs clear to auscultation , no respiratory distress  CV:  regular rate and rhythm, no murmur, rub, or gallop, peripheral edema 1+ in BLE  ABDOMEN:  normal bowel sounds, soft, nontender, no hepatosplenomegaly or other masses, no guarding or rebound  M/S:   Ambulates with walker. Wheelchair for long distance needs  SKIN:  Inspection of skin and subcutaneous tissue baseline, Palpation of skin and subcutaneous tissue baseline  NEURO:   Cranial nerves 2-12 are normal tested and grossly at patient's baseline, no purposeful movement in upper and lower extremities  PSYCH:  oriented X 3, affect and mood normal    Most Recent 3 CBC's:  Recent Labs   Lab Test 04/23/25  1210 04/14/25  1036 04/10/25  0820   WBC 6.3 7.4 6.0   HGB 13.9 13.6 13.5   MCV 96 99 98    202 218     Most Recent 3 BMP's:  Recent Labs   Lab Test 04/14/25  1036 04/10/25  0820 04/03/25  0945    145 141   POTASSIUM 3.5 3.4 3.6   CHLORIDE 101 103 98   CO2 28 32* 31*   BUN 18.0 21.9 " 16.4   CR 0.98* 1.02* 1.16*   ANIONGAP 12 10 12   MACY 9.2 9.7 10.1   * 78 215*     Most Recent 2 LFT's:  Recent Labs   Lab Test 04/14/25  1036 04/03/25  0945   AST 35 28   ALT 35 21   ALKPHOS 91 88   BILITOTAL 0.3 0.4     Most Recent Hemoglobin A1c:  Recent Labs   Lab Test 04/03/25  0945   A1C 6.9*     Most Recent Anemia Panel:  Recent Labs   Lab Test 04/23/25  1210 04/10/25  0820 04/03/25  0945 04/13/22  0559 04/10/22  1123   WBC 6.3   < > 8.6   < >  --    HGB 13.9   < > 12.9   < >  --    HCT 44.9   < > 42.4   < >  --    MCV 96   < > 95   < >  --       < > 253   < >  --    IRON  --   --   --   --  46   B12  --   --  987  --   --     < > = values in this interval not displayed.       ASSESSMENT/PLAN:     (R53.81) Physical deconditioning  (primary encounter diagnosis)  (M62.81) Generalized muscle weakness  Comment: Acute on chronic. S/T below diagnosis. Per therapy- she requires set up for UB needs, ModA for LB needs and toileting. Ambulates with 2WW up to 50 feet ModA. Christina for sit to stand transfers. SLUMS 20/30  Plan:   -Continue Physical therapy and Occupational therapy as directed  -SW to remain involved for safe discharge planning needs  -Recommend [facility] placement post TCU if accepted.      (Z91.81) Personal history of fall  (G89.4) Chronic pain syndrome  (G83.4) Cauda equina compression (H)  (M48.00) Spinal stenosis, unspecified spinal region  Comment: Acute on chronic. Per hospital records-presenting for leg pain and weakness following a fall. She is clinically stable. She has a known history of lumbar spinal stenosis as well as previously demonstrated L5 nerve impingement. By her report, she has had a chronic worsening of her functional status over time. The lower extremity weakness today appeared to precede her fall. There appears to have been some progression of lumbar spinal canal stenosis compared to the MRI from 9/25/24.   Plan:   -Monitor pain complaints as directed  -Follow up with  neurosurgery as directed   -Continue vitamin D 2000U daily  -Continue lyrica 100mg TID (max dose for CrCl)  -Increase Tylenol to 1000mg TID and daily PRN  -Change oxycodone to 5mg daily PRN x 2 weeks then discontinue  -BMP and CBC due 4/23/25--results pending  -Trend periodically while TCU     (I10) Essential hypertension  (E66.812,  E66.01) Class 2 severe obesity with body mass index (BMI) of 35 to 39.9 with serious comorbidity (H)  (E78.5) Dyslipidemia  (I89.0) Lymphedema  (N18.31) Stage 3a chronic kidney disease (H)  Comment: Chronic. Based on JNC-8 goals,  patients age of 84 year old, presence of diabetes or CKD, and goals of care goal BP is  <140/90 mm Hg. Patient is stable with current plan of care and routine assessment..  Plan:   -Monitor BP and HR  -Continue rosuvastatin 10mg daily  -Continue TG compression stockings to bilateral lower extremities daily. On in Am and off at HS  -Continue amlodipine 5mg daily. HOLD if SBP<100  -Continue triamterene-hydrochlorothiazide 37.5-25mg daily.. HOLD if SBP<100  -BMP and CBC due 4/23/25--results pending  -Trend periodically while TCU          (F41.9) Anxiety  (F32.A) Depression, unspecified depression type  (F31.9) Bipolar 1 disorder (H)  Comment: Chronic. Stable. I do not see any records from psych. UMS 20/30  Plan:   -Monitor mood and behaviors  -Monitor for worsening s/symptoms of concerns  -Monitor for changes in mobility, eating and sleeping patterns  -Continue buspirone 7.5mg BID  -Continue duloxetine 60mg daily  -Continue hydroxyzine 25mg BID  -Continue lamotrigine 100mg BID  -BMP and CBC due 4/23/25--results pending  -Trend periodically while TCU     (D64.9) Chronic anemia  Comment: Chronic. Baseline hgb~ 13  Plan:   -Monitor for bleeding risks  -Monitor for worsening s/symptoms of concerns  -BMP and CBC due 4/23/25--results pending  -Trend periodically while TCU     (H81.09) Meniere disease  Comment: Chronic. Followed with ENT--last visit back in  2022  Plan:  -Monitor for worsening s/symptoms of concerns  -ENT post TCU  -Continue prednisone 6mg daily (appears to be on this chronically since 2017 or so)  -BMP and CBC due 4/23/25--results pending  -Trend periodically while TCU     (L30.9) Dermatitis  Comment: Acute on chronic. Admitted with wound care to area.   Plan:  -Encourage repositioning  -Monitor for worsening s/symptoms of concerns  -Continue calmoseptine to area BID.     (E11.21) Type 2 diabetes mellitus with diabetic nephropathy, without long-term current use of insulin (H)  Comment: Chronic. Last A1c yesterday 4/3/25 was 6.8%. Goal <9%.   Plan:   -HOLD Weekly ozempic while on TCU  -Monitor for worsening s/symptoms of concerns  -Continue Empaglifozin 25mg daily  -Discontinued glipizide due to hypoglycemia risks  -Continue metformin 500mg daily with breakfast  -Monitor Blood Glucose BID. Update if Blood Glucose <70 and/or >450  -BMP and CBC due 4/23/25--results pending  -Trend periodically while TCU          (J44.9) Chronic obstructive pulmonary disease, unspecified COPD type (H)  (J45.20) Mild intermittent asthma without complication  Comment: Chronic. Stable.   Plan:   -Monitor respiratory status  -Monitor for worsening s/symptoms of concerns  -BMP and CBC due 4/23/25--results pending  -Trend periodically while TCU     (K59.01) Constipation  Comment: Acute on chronic. Suspect S/T polypharmacy. Abdominal xray on site revealed moderate stool burden.   Plan:  -Continue senna S 2 tabs BID  -Continue miralax daily scheduled  -Bisacodyl 10mg rectal supp daily PRN  -Lactulose 20gm BID PRN  -Attempt to limit PRN oxycodone  -Continue adequate hydration and ambulation practices.   -Monitor BM patterns     (N32.81) OAB  Comment: Chronic. Stable  Plan:  -Monitor urinary status  -Continue mirabegron 50mg daily  -Continue solenacin succinate 5mg daily  -Follow up with urology post TCU  -BMP and CBC due 4/23/25--results pending  -Trend periodically while TCU    "  (B36.9) Fungal dermatitis  Comment: Acute on chronic. It was reported of having fungal irritation under breast this past weekend  Plan:  -Continue miconazole powder BID  -Monitor for worsening s/symptoms of concerns     Electronically signed by:  Dr. Angeli Garcia DNP, APRN, FNP-C, WCS-C, EDS-C     Provider reviewed records from facility, and interpreted most recent imaging/lab work, and vital signs.   Acute and chronic conditions managed by writer. Have been reviewed during today's exam.      Face to Face and Medical Necessity Statement for DME Provider visit    Demographic Information on Jumana Yang:  Gender: female  : 1940  [address]   [place] [address] [phone] (home) [phone] (work)    Medical Record: [id number]  Social Security Number: [id number]  Primary Care Provider: Terra Mathews  Insurance: Payor: MEDICARE / Plan: MEDICARE / Product Type: Medicare /     HPI:   Jumana Yang is a 84 year old  (1940), who is being seen today for a face to face provider visit at [facility]; medical necessity statement for DME included. This patient requires the following:  DME Ordered and Medical Necessity Statement     Hospital Bed: Semi-electric  Mattress Type: Standard  Rail Type: half    Height:   Ht Readings from Last 2 Encounters:   25 1.676 m (5' 6\")   25 1.676 m (5' 6\")       Weight:   Wt Readings from Last 5 Encounters:   25 92.7 kg (204 lb 6.4 oz)   25 93.1 kg (205 lb 3.2 oz)   25 93.1 kg (205 lb 3.2 oz)   25 93.4 kg (206 lb)   25 93.4 kg (206 lb)     Patient has spinal stenosis with chronic pain and poor mobility which requires positioning of the body in ways not feasible with an ordinary bed. She needs bed due to needing elevation of 30 degrees along with assistance of pain alleviation. She does require a bed height different than a fixed height hospital bed to permit transfers to chair, wheelchair, or standing position. Due to " chronic pain and spinal stenosis concerns, patient does require frequent changes in body position and/or has an immediate need for change in body position.    Pt needing above DME with expected length of need of 99 year due to medical necessity associated with following diagnosis:     Physical deconditioning  Personal history of fall  Generalized muscle weakness  Chronic pain syndrome  Spinal stenosis, unspecified spinal region  Essential hypertension  Cauda equina compression (H)  Class 2 severe obesity with body mass index (BMI) of 35 to 39.9 with serious comorbidity (H)  Dyslipidemia  Lymphedema  Stage 3a chronic kidney disease (H)  Anxiety  Meniere's disease, unspecified laterality  Chronic anemia  Depression, unspecified depression type  Bipolar 1 disorder (H)  Type 2 diabetes mellitus with diabetic nephropathy, without long-term current use of insulin (H)  Chronic obstructive pulmonary disease, unspecified COPD type (H)  OAB (overactive bladder)  Mild intermittent asthma without complication  Dermatitis  Spinal stenosis of lumbar region, unspecified whether neurogenic claudication present  Slow transit constipation  JANIE (generalized anxiety disorder)  Fungal dermatitis      PMH   has a past medical history of Abdominal pain, Anxiety, Arthritis, Asthma, Bipolar 1 disorder (H), Chronic pain, Cochlear hydrops (01/01/1988), COPD (chronic obstructive pulmonary disease) (H), Depression, Diabetes mellitus (H), Dyspepsia, GERD (gastroesophageal reflux disease), Hard of hearing, Hepatic abscess, Hyperlipidemia, Hypertension, Hypothyroidism, Irritable bowel syndrome, Meniere disease, Neuropathy, Noninfectious ileitis, Peritoneal abscess (H), Spinal stenosis of lumbar region, Steroid long-term use, Vaginitis, atrophic, postmenopausal, and Vitamin D deficiency.    She has no past medical history of Blood clotting disorder.    ROS:4 point ROS including Respiratory, CV, GI and , other than that noted in the HPI,  is  "negative    EXAM  Vitals: /82   Pulse 82   Temp 98.1  F (36.7  C)   Resp 18   Ht 1.676 m (5' 6\")   Wt 92.7 kg (204 lb 6.4 oz)   LMP  (LMP Unknown)   SpO2 94%   BMI 32.99 kg/m  ;BMI= Body mass index is 32.99 kg/m .  GENERAL APPEARANCE:  Alert, in no distress, oriented, cooperative  ENT:  Mouth and posterior oropharynx normal, moist mucous membranes, [place]  EYES:  EOM, conjunctivae, lids, pupils and irises normal  NECK:  No adenopathy,masses or thyromegaly  RESP:  respiratory effort and palpation of chest normal, lungs clear to auscultation , no respiratory distress  CV:  regular rate and rhythm, no murmur, rub, or gallop, peripheral edema 1+ in BLE  ABDOMEN:  normal bowel sounds, soft, nontender, no hepatosplenomegaly or other masses, no guarding or rebound  M/S:   Ambulates with walker. Wheelchair for long distance needs  SKIN:  Inspection of skin and subcutaneous tissue baseline, Palpation of skin and subcutaneous tissue baseline  NEURO:   Cranial nerves 2-12 are normal tested and grossly at patient's baseline, no purposeful movement in upper and lower extremities  PSYCH:  oriented X 3, affect and mood normal     ASSESSMENT/PLAN:  1. Physical deconditioning    2. Personal history of fall    3. Generalized muscle weakness    4. Chronic pain syndrome    5. Spinal stenosis, unspecified spinal region    6. Essential hypertension    7. Cauda equina compression (H)    8. Class 2 severe obesity with body mass index (BMI) of 35 to 39.9 with serious comorbidity (H)    9. Dyslipidemia    10. Lymphedema    11. Stage 3a chronic kidney disease (H)    12. Anxiety    13. Meniere's disease, unspecified laterality    14. Chronic anemia    15. Depression, unspecified depression type    16. Bipolar 1 disorder (H)    17. Type 2 diabetes mellitus with diabetic nephropathy, without long-term current use of insulin (H)    18. Chronic obstructive pulmonary disease, unspecified COPD type (H)    19. OAB (overactive bladder)  "   20. Mild intermittent asthma without complication    21. Dermatitis    22. Spinal stenosis of lumbar region, unspecified whether neurogenic claudication present    23. Slow transit constipation    24. JANIE (generalized anxiety disorder)    25. Fungal dermatitis        Orders:  1. Facility staff/TC to contact DME company to get their order form for provider to fill out    ELECTRONICALLY SIGNED BY CORRIE CERTIFIED PROVIDER:  JUAN ANTONIO Barton CNP   NPI: [id number]  [facility]  [address] [place] [address]            Sincerely,        JUAN ANTONIO Barton CNP    Electronically signed

## 2025-04-23 NOTE — LETTER
4/23/2025      Jumana Yang  [address]  [place] [address]        [facility] GERIATRICS    Chief Complaint   Patient presents with     RECHECK     HPI:  Jumana Yang is a 84 year old  (1940), who is being seen today for an episodic care visit at: [facility] (TC[facility])([facility]) [[id number]]. Today's concern is: The primary encounter diagnosis was Physical deconditioning. Diagnoses of Personal history of fall, Generalized muscle weakness, Chronic pain syndrome, Spinal stenosis, unspecified spinal region, Essential hypertension, Cauda equina compression (H), Class 2 severe obesity with body mass index (BMI) of 35 to 39.9 with serious comorbidity (H), Dyslipidemia, Lymphedema, Stage 3a chronic kidney disease (H), Anxiety, Meniere's disease, unspecified laterality, Chronic anemia, Depression, unspecified depression type, Bipolar 1 disorder (H), Type 2 diabetes mellitus with diabetic nephropathy, without long-term current use of insulin (H), Chronic obstructive pulmonary disease, unspecified COPD type (H), OAB (overactive bladder), Mild intermittent asthma without complication, Dermatitis, Spinal stenosis of lumbar region, unspecified whether neurogenic claudication present, Slow transit constipation, JANIE (generalized anxiety disorder), and Fungal dermatitis were also pertinent to this visit.    Met with patient who was found sitting upright in wheelchair. She is very [place] and often communicates well with lip reading or by writing. She denies any chest pain, palpitations, shortness of breath, LOPEZ, lightheadedness, dizziness, or cough. Denies any abdominal discomfort. Denies N&V. Denies dysuria or frequency. Denies loose or constipation. Reports LBM yesterday which were soft. There are times where she refuses stool softeners. I did discuss goals of GDR off oxycodone prior to TCU discharge. Highy recommend this. Appetite good. Sleeping well. Reports pain is mostly first  "thing in the morning to low back.     BP Readings from Last 3 Encounters:   04/22/25 139/82   04/16/25 124/69   04/14/25 120/81     Wt Readings from Last 5 Encounters:   04/22/25 92.7 kg (204 lb 6.4 oz)   04/16/25 93.1 kg (205 lb 3.2 oz)   04/14/25 93.1 kg (205 lb 3.2 oz)   04/11/25 93.4 kg (206 lb)   04/08/25 93.4 kg (206 lb)     Allergies, and PMH/PSH reviewed in EPIC today.  REVIEW OF SYSTEMS:  4 point ROS including Respiratory, CV, GI and , other than that noted in the HPI,  is negative    Objective:   /82   Pulse 82   Temp 98.1  F (36.7  C)   Resp 18   Ht 1.676 m (5' 6\")   Wt 92.7 kg (204 lb 6.4 oz)   LMP  (LMP Unknown)   SpO2 94%   BMI 32.99 kg/m    GENERAL APPEARANCE:  Alert, in no distress, oriented, cooperative  ENT:  Mouth and posterior oropharynx normal, moist mucous membranes, [place]  EYES:  EOM, conjunctivae, lids, pupils and irises normal  NECK:  No adenopathy,masses or thyromegaly  RESP:  respiratory effort and palpation of chest normal, lungs clear to auscultation , no respiratory distress  CV:  regular rate and rhythm, no murmur, rub, or gallop, peripheral edema 1+ in BLE  ABDOMEN:  normal bowel sounds, soft, nontender, no hepatosplenomegaly or other masses, no guarding or rebound  M/S:   Ambulates with walker. Wheelchair for long distance needs  SKIN:  Inspection of skin and subcutaneous tissue baseline, Palpation of skin and subcutaneous tissue baseline  NEURO:   Cranial nerves 2-12 are normal tested and grossly at patient's baseline, no purposeful movement in upper and lower extremities  PSYCH:  oriented X 3, affect and mood normal    Most Recent 3 CBC's:  Recent Labs   Lab Test 04/23/25  1210 04/14/25  1036 04/10/25  0820   WBC 6.3 7.4 6.0   HGB 13.9 13.6 13.5   MCV 96 99 98    202 218     Most Recent 3 BMP's:  Recent Labs   Lab Test 04/23/25  1210 04/14/25  1036 04/10/25  0820    141 145   POTASSIUM 4.1 3.5 3.4   CHLORIDE 100 101 103   CO2 29 28 32*   BUN 19.9 18.0 " 21.9   CR 1.06* 0.98* 1.02*   ANIONGAP 13 12 10   MACY 9.7 9.2 9.7   * 125* 78     Most Recent 2 LFT's:  Recent Labs   Lab Test 04/14/25  1036 04/03/25  0945   AST 35 28   ALT 35 21   ALKPHOS 91 88   BILITOTAL 0.3 0.4     Most Recent Hemoglobin A1c:  Recent Labs   Lab Test 04/03/25  0945   A1C 6.9*     Most Recent Anemia Panel:  Recent Labs   Lab Test 04/23/25  1210 04/10/25  0820 04/03/25  0945 04/13/22  0559 04/10/22  1123   WBC 6.3   < > 8.6   < >  --    HGB 13.9   < > 12.9   < >  --    HCT 44.9   < > 42.4   < >  --    MCV 96   < > 95   < >  --       < > 253   < >  --    IRON  --   --   --   --  46   B12  --   --  987  --   --     < > = values in this interval not displayed.       ASSESSMENT/PLAN:     (R53.81) Physical deconditioning  (primary encounter diagnosis)  (M62.81) Generalized muscle weakness  Comment: Acute on chronic. S/T below diagnosis. Per therapy- she requires set up for UB needs, ModA for LB needs and toileting. Ambulates with 2WW up to 50 feet ModA. Christina for sit to stand transfers. SLUMS 20/30  Plan:   -Continue Physical therapy and Occupational therapy as directed  -SW to remain involved for safe discharge planning needs  -Recommend [facility] placement post TCU if accepted.      (Z91.81) Personal history of fall  (G89.4) Chronic pain syndrome  (G83.4) Cauda equina compression (H)  (M48.00) Spinal stenosis, unspecified spinal region  Comment: Acute on chronic. Per hospital records-presenting for leg pain and weakness following a fall. She is clinically stable. She has a known history of lumbar spinal stenosis as well as previously demonstrated L5 nerve impingement. By her report, she has had a chronic worsening of her functional status over time. The lower extremity weakness today appeared to precede her fall. There appears to have been some progression of lumbar spinal canal stenosis compared to the MRI from 9/25/24.   Plan:   -Monitor pain complaints as directed  -Follow up with  neurosurgery as directed   -Continue vitamin D 2000U daily  -Continue lyrica 100mg TID (max dose for CrCl)  -Increase Tylenol to 1000mg TID and daily PRN  -Change oxycodone to 5mg daily PRN x 2 weeks then discontinue  -BMP and CBC due 4/23/25--results pending  -Trend periodically while TCU     (I10) Essential hypertension  (E66.812,  E66.01) Class 2 severe obesity with body mass index (BMI) of 35 to 39.9 with serious comorbidity (H)  (E78.5) Dyslipidemia  (I89.0) Lymphedema  (N18.31) Stage 3a chronic kidney disease (H)  Comment: Chronic. Based on JNC-8 goals,  patients age of 84 year old, presence of diabetes or CKD, and goals of care goal BP is  <140/90 mm Hg. Patient is stable with current plan of care and routine assessment..  Plan:   -Monitor BP and HR  -Continue rosuvastatin 10mg daily  -Continue TG compression stockings to bilateral lower extremities daily. On in Am and off at HS  -Continue amlodipine 5mg daily. HOLD if SBP<100  -Continue triamterene-hydrochlorothiazide 37.5-25mg daily.. HOLD if SBP<100  -BMP and CBC due 4/23/25--results pending  -Trend periodically while TCU          (F41.9) Anxiety  (F32.A) Depression, unspecified depression type  (F31.9) Bipolar 1 disorder (H)  Comment: Chronic. Stable. I do not see any records from psych. UMS 20/30  Plan:   -Monitor mood and behaviors  -Monitor for worsening s/symptoms of concerns  -Monitor for changes in mobility, eating and sleeping patterns  -Continue buspirone 7.5mg BID  -Continue duloxetine 60mg daily  -Continue hydroxyzine 25mg BID  -Continue lamotrigine 100mg BID  -BMP and CBC due 4/23/25--results pending  -Trend periodically while TCU     (D64.9) Chronic anemia  Comment: Chronic. Baseline hgb~ 13  Plan:   -Monitor for bleeding risks  -Monitor for worsening s/symptoms of concerns  -BMP and CBC due 4/23/25--results pending  -Trend periodically while TCU     (H81.09) Meniere disease  Comment: Chronic. Followed with ENT--last visit back in  2022  Plan:  -Monitor for worsening s/symptoms of concerns  -ENT post TCU  -Continue prednisone 6mg daily (appears to be on this chronically since 2017 or so)  -BMP and CBC due 4/23/25--results pending  -Trend periodically while TCU     (L30.9) Dermatitis  Comment: Acute on chronic. Admitted with wound care to area.   Plan:  -Encourage repositioning  -Monitor for worsening s/symptoms of concerns  -Continue calmoseptine to area BID.     (E11.21) Type 2 diabetes mellitus with diabetic nephropathy, without long-term current use of insulin (H)  Comment: Chronic. Last A1c yesterday 4/3/25 was 6.8%. Goal <9%.   Plan:   -HOLD Weekly ozempic while on TCU  -Monitor for worsening s/symptoms of concerns  -Continue Empaglifozin 25mg daily  -Discontinued glipizide due to hypoglycemia risks  -Continue metformin 500mg daily with breakfast  -Monitor Blood Glucose BID. Update if Blood Glucose <70 and/or >450  -BMP and CBC due 4/23/25--results pending  -Trend periodically while TCU          (J44.9) Chronic obstructive pulmonary disease, unspecified COPD type (H)  (J45.20) Mild intermittent asthma without complication  Comment: Chronic. Stable.   Plan:   -Monitor respiratory status  -Monitor for worsening s/symptoms of concerns  -BMP and CBC due 4/23/25--results pending  -Trend periodically while TCU     (K59.01) Constipation  Comment: Acute on chronic. Suspect S/T polypharmacy. Abdominal xray on site revealed moderate stool burden.   Plan:  -Continue senna S 2 tabs BID  -Continue miralax daily scheduled  -Bisacodyl 10mg rectal supp daily PRN  -Lactulose 20gm BID PRN  -Attempt to limit PRN oxycodone  -Continue adequate hydration and ambulation practices.   -Monitor BM patterns     (N32.81) OAB  Comment: Chronic. Stable  Plan:  -Monitor urinary status  -Continue mirabegron 50mg daily  -Continue solenacin succinate 5mg daily  -Follow up with urology post TCU  -BMP and CBC due 4/23/25--results pending  -Trend periodically while TCU    "  (B36.9) Fungal dermatitis  Comment: Acute on chronic. It was reported of having fungal irritation under breast this past weekend  Plan:  -Continue miconazole powder BID  -Monitor for worsening s/symptoms of concerns     Electronically signed by:  Dr. Anglei Garcia DNP, APRN, FNP-C, WCS-C, EDS-C     Provider reviewed records from facility, and interpreted most recent imaging/lab work, and vital signs.   Acute and chronic conditions managed by writer. Have been reviewed during today's exam.      Face to Face and Medical Necessity Statement for DME Provider visit    Demographic Information on Jumana Yang:  Gender: female  : 1940  [address]   [place] [address] [phone] (home) [phone] (work)    Medical Record: [id number]  Social Security Number: [id number]  Primary Care Provider: Terra Mathews  Insurance: Payor: MEDICARE / Plan: MEDICARE / Product Type: Medicare /     HPI:   Jumana Yang is a 84 year old  (1940), who is being seen today for a face to face provider visit at [facility]; medical necessity statement for DME included. This patient requires the following:  DME Ordered and Medical Necessity Statement     Hospital Bed: Fully electric  Mattress Type: Standard  Rail Type: half    Height:   Ht Readings from Last 2 Encounters:   25 1.676 m (5' 6\")   25 1.676 m (5' 6\")       Weight:   Wt Readings from Last 5 Encounters:   25 92.7 kg (204 lb 6.4 oz)   25 93.1 kg (205 lb 3.2 oz)   25 93.1 kg (205 lb 3.2 oz)   25 93.4 kg (206 lb)   25 93.4 kg (206 lb)     Patient has spinal stenosis with chronic pain and poor mobility which requires positioning of the body in ways not feasible with an ordinary bed. She needs bed due to needing elevation of 30 degrees along with assistance of pain alleviation. She does require a bed height different than a fixed height hospital bed to permit transfers to chair, wheelchair, or standing position. Due to " chronic pain and spinal stenosis concerns, patient does require frequent changes in body position and/or has an immediate need for change in body position. Patient requires change of bed height at least once per day to allow caregiver to assist with all the necessary cares in the bed and caregiver is unable to change the bed height manually.     Pt needing above DME with expected length of need of 99 year due to medical necessity associated with following diagnosis:     Physical deconditioning  Personal history of fall  Generalized muscle weakness  Chronic pain syndrome  Spinal stenosis, unspecified spinal region  Essential hypertension  Cauda equina compression (H)  Class 2 severe obesity with body mass index (BMI) of 35 to 39.9 with serious comorbidity (H)  Dyslipidemia  Lymphedema  Stage 3a chronic kidney disease (H)  Anxiety  Meniere's disease, unspecified laterality  Chronic anemia  Depression, unspecified depression type  Bipolar 1 disorder (H)  Type 2 diabetes mellitus with diabetic nephropathy, without long-term current use of insulin (H)  Chronic obstructive pulmonary disease, unspecified COPD type (H)  OAB (overactive bladder)  Mild intermittent asthma without complication  Dermatitis  Spinal stenosis of lumbar region, unspecified whether neurogenic claudication present  Slow transit constipation  JANIE (generalized anxiety disorder)  Fungal dermatitis      PMH   has a past medical history of Abdominal pain, Anxiety, Arthritis, Asthma, Bipolar 1 disorder (H), Chronic pain, Cochlear hydrops (01/01/1988), COPD (chronic obstructive pulmonary disease) (H), Depression, Diabetes mellitus (H), Dyspepsia, GERD (gastroesophageal reflux disease), Hard of hearing, Hepatic abscess, Hyperlipidemia, Hypertension, Hypothyroidism, Irritable bowel syndrome, Meniere disease, Neuropathy, Noninfectious ileitis, Peritoneal abscess (H), Spinal stenosis of lumbar region, Steroid long-term use, Vaginitis, atrophic, postmenopausal,  "and Vitamin D deficiency.    She has no past medical history of Blood clotting disorder.    ROS:4 point ROS including Respiratory, CV, GI and , other than that noted in the HPI,  is negative    EXAM  Vitals: /82   Pulse 82   Temp 98.1  F (36.7  C)   Resp 18   Ht 1.676 m (5' 6\")   Wt 92.7 kg (204 lb 6.4 oz)   LMP  (LMP Unknown)   SpO2 94%   BMI 32.99 kg/m  ;BMI= Body mass index is 32.99 kg/m .  GENERAL APPEARANCE:  Alert, in no distress, oriented, cooperative  ENT:  Mouth and posterior oropharynx normal, moist mucous membranes, [place]  EYES:  EOM, conjunctivae, lids, pupils and irises normal  NECK:  No adenopathy,masses or thyromegaly  RESP:  respiratory effort and palpation of chest normal, lungs clear to auscultation , no respiratory distress  CV:  regular rate and rhythm, no murmur, rub, or gallop, peripheral edema 1+ in BLE  ABDOMEN:  normal bowel sounds, soft, nontender, no hepatosplenomegaly or other masses, no guarding or rebound  M/S:   Ambulates with walker. Wheelchair for long distance needs  SKIN:  Inspection of skin and subcutaneous tissue baseline, Palpation of skin and subcutaneous tissue baseline  NEURO:   Cranial nerves 2-12 are normal tested and grossly at patient's baseline, no purposeful movement in upper and lower extremities  PSYCH:  oriented X 3, affect and mood normal     ASSESSMENT/PLAN:  1. Physical deconditioning    2. Personal history of fall    3. Generalized muscle weakness    4. Chronic pain syndrome    5. Spinal stenosis, unspecified spinal region    6. Essential hypertension    7. Cauda equina compression (H)    8. Class 2 severe obesity with body mass index (BMI) of 35 to 39.9 with serious comorbidity (H)    9. Dyslipidemia    10. Lymphedema    11. Stage 3a chronic kidney disease (H)    12. Anxiety    13. Meniere's disease, unspecified laterality    14. Chronic anemia    15. Depression, unspecified depression type    16. Bipolar 1 disorder (H)    17. Type 2 diabetes " mellitus with diabetic nephropathy, without long-term current use of insulin (H)    18. Chronic obstructive pulmonary disease, unspecified COPD type (H)    19. OAB (overactive bladder)    20. Mild intermittent asthma without complication    21. Dermatitis    22. Spinal stenosis of lumbar region, unspecified whether neurogenic claudication present    23. Slow transit constipation    24. JANIE (generalized anxiety disorder)    25. Fungal dermatitis        Orders:  1. Facility staff/TC to contact DME company to get their order form for provider to fill out    ELECTRONICALLY SIGNED BY CORRIE CERTIFIED PROVIDER:  JUAN ANTONIO Barton CNP   NPI: [id number]  [facility]  [address] [place] [address]            Sincerely,        JUAN ANTONIO Barton CNP    Electronically signed

## 2025-04-24 VITALS
DIASTOLIC BLOOD PRESSURE: 78 MMHG | OXYGEN SATURATION: 94 % | HEART RATE: 72 BPM | HEIGHT: 66 IN | SYSTOLIC BLOOD PRESSURE: 153 MMHG | WEIGHT: 204.4 LBS | BODY MASS INDEX: 32.85 KG/M2 | TEMPERATURE: 97.3 F | RESPIRATION RATE: 18 BRPM

## 2025-04-24 RX ORDER — CARBOXYMETHYLCELLULOSE SODIUM 5 MG/ML
SOLUTION/ DROPS OPHTHALMIC
Qty: 30 ML | Refills: 1 | Status: SHIPPED | OUTPATIENT
Start: 2025-04-24

## 2025-04-24 NOTE — PROGRESS NOTES
"[facility] GERIATRICS    Chief Complaint   Patient presents with    RECHECK     HPI:  Jumana Yang is a 84 year old  (1940), who is being seen today for an episodic care visit at: [facility] ([facility])([facility]) [[id number]]. Today's concern is: The primary encounter diagnosis was Physical deconditioning. Diagnoses of Personal history of fall, Generalized muscle weakness, Chronic pain syndrome, Spinal stenosis, unspecified spinal region, Essential hypertension, Cauda equina compression (H), Class 2 severe obesity with body mass index (BMI) of 35 to 39.9 with serious comorbidity (H), Dyslipidemia, Lymphedema, Stage 3a chronic kidney disease (H), Anxiety, Type 2 diabetes mellitus with diabetic nephropathy, without long-term current use of insulin (H), Meniere's disease, unspecified laterality, Bipolar 1 disorder (H), Chronic anemia, Depression, unspecified depression type, Chronic obstructive pulmonary disease, unspecified COPD type (H), OAB (overactive bladder), Mild intermittent asthma without complication, Dermatitis, Spinal stenosis of lumbar region, unspecified whether neurogenic claudication present, Slow transit constipation, JANIE (generalized anxiety disorder), Fungal dermatitis, Conjunctival hemorrhage of left eye, and Dry eyes were also pertinent to this visit.    ***    BP Readings from Last 3 Encounters:   04/24/25 (!) 153/78   04/22/25 139/82   04/16/25 124/69     Wt Readings from Last 5 Encounters:   04/24/25 92.7 kg (204 lb 6.4 oz)   04/22/25 92.7 kg (204 lb 6.4 oz)   04/16/25 93.1 kg (205 lb 3.2 oz)   04/14/25 93.1 kg (205 lb 3.2 oz)   04/11/25 93.4 kg (206 lb)     Allergies, and PMH/PSH reviewed in EPIC today.  REVIEW OF SYSTEMS:  4 point ROS including Respiratory, CV, GI and , other than that noted in the HPI,  is negative    Objective:   BP (!) 153/78   Pulse 72   Temp 97.3  F (36.3  C)   Resp 18   Ht 1.676 m (5' 6\")   Wt 92.7 kg (204 lb 6.4 oz)   LMP  (LMP " "Unknown)   SpO2 94%   BMI 32.99 kg/m    GENERAL APPEARANCE:  {[facility] GENERAL APPEARANCE:524066}  ENT:  {[facility] ENT PE:031677::\"Mouth and posterior oropharynx normal, moist mucous membranes\"}  EYES:  {[facility] EYES PE :985576::\"EOM, conjunctivae, lids, pupils and irises normal\"}  NECK:  {[facility] NECK PE:995696::\"No adenopathy,masses or thyromegaly\"}  RESP:  {[facility] RESP PE:156831}  CV:  {[facility] CV PE:023316}  ABDOMEN:  {[facility] GI PE:326485::\"normal bowel sounds, soft, nontender, no hepatosplenomegaly or other masses\"}  M/S:   {[facility] M/S PE:742451}  SKIN:  {[facility] SK[facility] PE:779336}  NEURO:   {[facility] NEURO PE:108796}  PSYCH:  {[facility] PSYCH PE:173449}    Most Recent 3 CBC's:  Recent Labs   Lab Test 04/23/25  1210 04/14/25  1036 04/10/25  0820   WBC 6.3 7.4 6.0   HGB 13.9 13.6 13.5   MCV 96 99 98    202 218     Most Recent 3 BMP's:  Recent Labs   Lab Test 04/23/25  1210 04/14/25  1036 04/10/25  0820    141 145   POTASSIUM 4.1 3.5 3.4   CHLORIDE 100 101 103   CO2 29 28 32*   BUN 19.9 18.0 21.9   CR 1.06* 0.98* 1.02*   ANIONGAP 13 12 10   MACY 9.7 9.2 9.7   * 125* 78     Most Recent 2 LFT's:  Recent Labs   Lab Test 04/14/25  1036 04/03/25  0945   AST 35 28   ALT 35 21   ALKPHOS 91 88   BILITOTAL 0.3 0.4     Most Recent Anemia Panel:  Recent Labs   Lab Test 04/23/25  1210 04/10/25  0820 04/03/25  0945 04/13/22  0559 04/10/22  1123   WBC 6.3   < > 8.6   < >  --    HGB 13.9   < > 12.9   < >  --    HCT 44.9   < > 42.4   < >  --    MCV 96   < > 95   < >  --       < > 253   < >  --    IRON  --   --   --   --  46   B12  --   --  987  --   --     < > = values in this interval not displayed.       ASSESSMENT/PLAN:     (R53.81) Physical deconditioning  (primary encounter diagnosis)  (M62.81) Generalized muscle weakness  Comment: Acute on chronic. S/T below diagnosis. [facility] 20/30  Plan:   -Continue Physical therapy and Occupational therapy as " directed  -SW to remain involved for safe discharge planning needs  -Recommend KEVIN placement post TCU if accepted. Home evaluation scheduled for 4/29/25     (Z91.81) Personal history of fall  (G89.4) Chronic pain syndrome  (G83.4) Cauda equina compression (H)  (M48.00) Spinal stenosis, unspecified spinal region  Comment: Acute on chronic. Per hospital records-presenting for leg pain and weakness following a fall. She is clinically stable. She has a known history of lumbar spinal stenosis as well as previously demonstrated L5 nerve impingement. By her report, she has had a chronic worsening of her functional status over time. The lower extremity weakness today appeared to precede her fall. There appears to have been some progression of lumbar spinal canal stenosis compared to the MRI from 9/25/24.   Plan:   -Monitor pain complaints as directed  -Follow up with neurosurgery as directed   -Continue vitamin D 2000U daily  -Continue lyrica 100mg TID (max dose for CrCl)  -Continue Tylenol 1000mg TID and daily PRN  -Continue oxycodone to 5mg daily PRN x 2 weeks then discontinue  -Trend periodically while TCU     (I10) Essential hypertension  (E66.812,  E66.01) Class 2 severe obesity with body mass index (BMI) of 35 to 39.9 with serious comorbidity (H)  (E78.5) Dyslipidemia  (I89.0) Lymphedema  (N18.31) Stage 3a chronic kidney disease (H)  Comment: Chronic. Based on JNC-8 goals,  patients age of 84 year old, presence of diabetes or CKD, and goals of care goal BP is  <140/90 mm Hg. Patient is stable with current plan of care and routine assessment..  Plan:   -Monitor BP and HR  -Continue rosuvastatin 10mg daily  -Continue TG compression stockings to bilateral lower extremities daily. On in Am and off at HS  -Continue amlodipine 5mg daily. HOLD if SBP<100  -Continue triamterene-hydrochlorothiazide 37.5-25mg daily.. HOLD if SBP<100  -Trend periodically while TCU     (F41.9) Anxiety  (F32.A) Depression, unspecified depression  type  (F31.9) Bipolar 1 disorder (H)  Comment: Chronic. Stable. I do not see any records from psych. [facility] 20/30  Plan:   -Monitor mood and behaviors  -Monitor for worsening s/symptoms of concerns  -Monitor for changes in mobility, eating and sleeping patterns  -Continue buspirone 7.5mg BID  -Continue duloxetine 60mg daily  -Continue hydroxyzine 25mg BID  -Continue lamotrigine 100mg BID  -Trend periodically while TCU     (D64.9) Chronic anemia  Comment: Chronic. Baseline hgb~ 13  Plan:   -Monitor for bleeding risks  -Monitor for worsening s/symptoms of concerns  -Trend periodically while TCU     (H81.09) Meniere disease  Comment: Chronic. Followed with ENT--last visit back in 2022  Plan:  -Monitor for worsening s/symptoms of concerns  -ENT post TCU  -Continue prednisone 6mg daily (appears to be on this chronically since 2017 or so)  -Trend periodically while TCU     (L30.9) Dermatitis  Comment: Acute on chronic. Admitted with wound care to area.   Plan:  -Encourage repositioning  -Monitor for worsening s/symptoms of concerns  -Continue calmoseptine to area BID.     (E11.21) Type 2 diabetes mellitus with diabetic nephropathy, without long-term current use of insulin (H)  Comment: Chronic. Last A1c yesterday 4/3/25 was 6.8%. Goal <9%.   Plan:   -HOLD Weekly ozempic while on TCU  -Monitor for worsening s/symptoms of concerns  -Continue Empaglifozin 25mg daily  -Discontinued glipizide due to hypoglycemia risks  -Continue metformin 500mg daily with breakfast  -Monitor Blood Glucose BID. Update if Blood Glucose <70 and/or >450  -Trend periodically while TCU     (J44.9) Chronic obstructive pulmonary disease, unspecified COPD type (H)  (J45.20) Mild intermittent asthma without complication  Comment: Chronic. Stable.   Plan:   -Monitor respiratory status  -Monitor for worsening s/symptoms of concerns  -Trend periodically while TCU     (K59.01) Constipation  Comment: Acute on chronic. Suspect S/T polypharmacy. Abdominal xray  on site revealed moderate stool burden.   Plan:  -Continue senna S 2 tabs BID  -Continue miralax daily scheduled  -Bisacodyl 10mg rectal supp daily PRN  -Lactulose 20gm BID PRN--discontinue next week if non use  -Continue adequate hydration and ambulation practices.   -Monitor BM patterns     (N32.81) OAB  Comment: Chronic. Stable  Plan:  -Monitor urinary status  -Continue mirabegron 50mg daily  -Continue solenacin succinate 5mg daily  -Follow up with urology post TCU  -Trend periodically while TCU     (H04.123) Dry eyes  (H11.32) Conjunctival hemorrhage of left eye  Comment: Acute. She noted this few days ago. Suspect S/T constipation strain  Plan:  -Monitor for worsening s/symptoms of concerns  -Continue refresh tears BID and PRN  -Ophthalmology referral if worsens.      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   Plan:  -Continue miconazole powder BID  -Monitor for worsening s/symptoms of concerns     Electronically signed by:  Dr. Angeli Garcia DNP, APRN, FNP-C, WCS-C, EDS-C     Provider reviewed records from facility, and interpreted most recent imaging/lab work, and vital signs.   Acute and chronic conditions managed by writer. Have been reviewed during today's exam.

## 2025-04-25 ENCOUNTER — TRANSITIONAL CARE UNIT VISIT (OUTPATIENT)
Dept: GERIATRICS | Facility: CLINIC | Age: 85
End: 2025-04-25
Payer: MEDICARE

## 2025-04-25 DIAGNOSIS — E66.01 CLASS 2 SEVERE OBESITY WITH BODY MASS INDEX (BMI) OF 35 TO 39.9 WITH SERIOUS COMORBIDITY (H): ICD-10-CM

## 2025-04-25 DIAGNOSIS — F41.1 GAD (GENERALIZED ANXIETY DISORDER): ICD-10-CM

## 2025-04-25 DIAGNOSIS — E78.5 DYSLIPIDEMIA: ICD-10-CM

## 2025-04-25 DIAGNOSIS — J44.9 CHRONIC OBSTRUCTIVE PULMONARY DISEASE, UNSPECIFIED COPD TYPE (H): ICD-10-CM

## 2025-04-25 DIAGNOSIS — M48.061 SPINAL STENOSIS OF LUMBAR REGION, UNSPECIFIED WHETHER NEUROGENIC CLAUDICATION PRESENT: ICD-10-CM

## 2025-04-25 DIAGNOSIS — N32.81 OAB (OVERACTIVE BLADDER): ICD-10-CM

## 2025-04-25 DIAGNOSIS — E11.21 TYPE 2 DIABETES MELLITUS WITH DIABETIC NEPHROPATHY, WITHOUT LONG-TERM CURRENT USE OF INSULIN (H): ICD-10-CM

## 2025-04-25 DIAGNOSIS — R53.81 PHYSICAL DECONDITIONING: Primary | ICD-10-CM

## 2025-04-25 DIAGNOSIS — F41.9 ANXIETY: ICD-10-CM

## 2025-04-25 DIAGNOSIS — G83.4 CAUDA EQUINA COMPRESSION (H): ICD-10-CM

## 2025-04-25 DIAGNOSIS — H11.32 CONJUNCTIVAL HEMORRHAGE OF LEFT EYE: ICD-10-CM

## 2025-04-25 DIAGNOSIS — F31.9 BIPOLAR 1 DISORDER (H): ICD-10-CM

## 2025-04-25 DIAGNOSIS — I10 ESSENTIAL HYPERTENSION: ICD-10-CM

## 2025-04-25 DIAGNOSIS — H81.09 MENIERE'S DISEASE, UNSPECIFIED LATERALITY: ICD-10-CM

## 2025-04-25 DIAGNOSIS — M48.00 SPINAL STENOSIS, UNSPECIFIED SPINAL REGION: ICD-10-CM

## 2025-04-25 DIAGNOSIS — L30.9 DERMATITIS: ICD-10-CM

## 2025-04-25 DIAGNOSIS — I89.0 LYMPHEDEMA: ICD-10-CM

## 2025-04-25 DIAGNOSIS — B36.9 FUNGAL DERMATITIS: ICD-10-CM

## 2025-04-25 DIAGNOSIS — Z91.81 PERSONAL HISTORY OF FALL: ICD-10-CM

## 2025-04-25 DIAGNOSIS — E66.812 CLASS 2 SEVERE OBESITY WITH BODY MASS INDEX (BMI) OF 35 TO 39.9 WITH SERIOUS COMORBIDITY (H): ICD-10-CM

## 2025-04-25 DIAGNOSIS — D64.9 CHRONIC ANEMIA: ICD-10-CM

## 2025-04-25 DIAGNOSIS — M62.81 GENERALIZED MUSCLE WEAKNESS: ICD-10-CM

## 2025-04-25 DIAGNOSIS — G89.4 CHRONIC PAIN SYNDROME: ICD-10-CM

## 2025-04-25 DIAGNOSIS — F32.A DEPRESSION, UNSPECIFIED DEPRESSION TYPE: ICD-10-CM

## 2025-04-25 DIAGNOSIS — N18.31 STAGE 3A CHRONIC KIDNEY DISEASE (H): ICD-10-CM

## 2025-04-25 DIAGNOSIS — J45.20 MILD INTERMITTENT ASTHMA WITHOUT COMPLICATION: ICD-10-CM

## 2025-04-25 DIAGNOSIS — K59.01 SLOW TRANSIT CONSTIPATION: ICD-10-CM

## 2025-04-25 DIAGNOSIS — H04.123 DRY EYES: ICD-10-CM

## 2025-04-25 NOTE — LETTER
4/25/2025      Jumana Yang  [address]  [place] [address]        [facility]    No chief complaint on file.    HPI:  Jumana Yang is a 84 year old  (1940), who is being seen today for an episodic care visit at: No question data found.. Today's concern is: The primary encounter diagnosis was Physical deconditioning. Diagnoses of Personal history of fall, Generalized muscle weakness, Chronic pain syndrome, Spinal stenosis, unspecified spinal region, Essential hypertension, Cauda equina compression (H), Class 2 severe obesity with body mass index (BMI) of 35 to 39.9 with serious comorbidity (H), Dyslipidemia, Lymphedema, Stage 3a chronic kidney disease (H), Anxiety, Type 2 diabetes mellitus with diabetic nephropathy, without long-term current use of insulin (H), Meniere's disease, unspecified laterality, Bipolar 1 disorder (H), Chronic anemia, Depression, unspecified depression type, Chronic obstructive pulmonary disease, unspecified COPD type (H), OAB (overactive bladder), Mild intermittent asthma without complication, Dermatitis, Spinal stenosis of lumbar region, unspecified whether neurogenic claudication present, Slow transit constipation, JANIE (generalized anxiety disorder), Fungal dermatitis, Conjunctival hemorrhage of left eye, and Dry eyes were also pertinent to this visit.    ***    BP Readings from Last 3 Encounters:   04/22/25 139/82   04/16/25 124/69   04/14/25 120/81     Wt Readings from Last 5 Encounters:   04/22/25 92.7 kg (204 lb 6.4 oz)   04/16/25 93.1 kg (205 lb 3.2 oz)   04/14/25 93.1 kg (205 lb 3.2 oz)   04/11/25 93.4 kg (206 lb)   04/08/25 93.4 kg (206 lb)     Allergies, and PMH/PSH reviewed in EPIC today.  REVIEW OF SYSTEMS:  4 point ROS including Respiratory, CV, GI and , other than that noted in the HPI,  is negative    Objective:   LMP  (LMP Unknown)   GENERAL APPEARANCE:  {[facility] GENERAL APPEARANCE:941105}  ENT:  {[facility]  "PE:247985::\"Mouth and posterior oropharynx normal, moist mucous membranes\"}  EYES:  {[facility] EYES PE :119095::\"EOM, conjunctivae, lids, pupils and irises normal\"}  NECK:  {[facility] NECK PE:619162::\"No adenopathy,masses or thyromegaly\"}  RESP:  {[facility] RESP PE:862920}  CV:  {[facility] CV PE:764924}  ABDOMEN:  {[facility] GI PE:686894::\"normal bowel sounds, soft, nontender, no hepatosplenomegaly or other masses\"}  M/S:   {[facility] M/S PE:667640}  SKIN:  {[facility] SK[facility] PE:184828}  NEURO:   {[facility] NEURO PE:600868}  PSYCH:  {[facility] PSYCH PE:716606}    Most Recent 3 CBC's:  Recent Labs   Lab Test 04/23/25  1210 04/14/25  1036 04/10/25  0820   WBC 6.3 7.4 6.0   HGB 13.9 13.6 13.5   MCV 96 99 98    202 218     Most Recent 3 BMP's:  Recent Labs   Lab Test 04/23/25  1210 04/14/25  1036 04/10/25  0820    141 145   POTASSIUM 4.1 3.5 3.4   CHLORIDE 100 101 103   CO2 29 28 32*   BUN 19.9 18.0 21.9   CR 1.06* 0.98* 1.02*   ANIONGAP 13 12 10   MACY 9.7 9.2 9.7   * 125* 78     Most Recent 2 LFT's:  Recent Labs   Lab Test 04/14/25  1036 04/03/25  0945   AST 35 28   ALT 35 21   ALKPHOS 91 88   BILITOTAL 0.3 0.4     Most Recent Anemia Panel:  Recent Labs   Lab Test 04/23/25  1210 04/10/25  0820 04/03/25  0945 04/13/22  0559 04/10/22  1123   WBC 6.3   < > 8.6   < >  --    HGB 13.9   < > 12.9   < >  --    HCT 44.9   < > 42.4   < >  --    MCV 96   < > 95   < >  --       < > 253   < >  --    IRON  --   --   --   --  46   B12  --   --  987  --   --     < > = values in this interval not displayed.       ASSESSMENT/PLAN:     (R53.81) Physical deconditioning  (primary encounter diagnosis)  (M62.81) Generalized muscle weakness  Comment: Acute on chronic. S/T below diagnosis. [facility] 20/30  Plan:   -Continue Physical therapy and Occupational therapy as directed  -SW to remain involved for safe discharge planning needs  -Recommend KEVIN placement post TCU if accepted. Home evaluation " scheduled for 4/29/25     (Z91.81) Personal history of fall  (G89.4) Chronic pain syndrome  (G83.4) Cauda equina compression (H)  (M48.00) Spinal stenosis, unspecified spinal region  Comment: Acute on chronic. Per hospital records-presenting for leg pain and weakness following a fall. She is clinically stable. She has a known history of lumbar spinal stenosis as well as previously demonstrated L5 nerve impingement. By her report, she has had a chronic worsening of her functional status over time. The lower extremity weakness today appeared to precede her fall. There appears to have been some progression of lumbar spinal canal stenosis compared to the MRI from 9/25/24.   Plan:   -Monitor pain complaints as directed  -Follow up with neurosurgery as directed   -Continue vitamin D 2000U daily  -Continue lyrica 100mg TID (max dose for CrCl)  -Continue Tylenol 1000mg TID and daily PRN  -Continue oxycodone to 5mg daily PRN x 2 weeks then discontinue  -Trend periodically while TCU     (I10) Essential hypertension  (E66.812,  E66.01) Class 2 severe obesity with body mass index (BMI) of 35 to 39.9 with serious comorbidity (H)  (E78.5) Dyslipidemia  (I89.0) Lymphedema  (N18.31) Stage 3a chronic kidney disease (H)  Comment: Chronic. Based on JNC-8 goals,  patients age of 84 year old, presence of diabetes or CKD, and goals of care goal BP is  <140/90 mm Hg. Patient is stable with current plan of care and routine assessment..  Plan:   -Monitor BP and HR  -Continue rosuvastatin 10mg daily  -Continue TG compression stockings to bilateral lower extremities daily. On in Am and off at HS  -Continue amlodipine 5mg daily. HOLD if SBP<100  -Continue triamterene-hydrochlorothiazide 37.5-25mg daily.. HOLD if SBP<100  -Trend periodically while TCU     (F41.9) Anxiety  (F32.A) Depression, unspecified depression type  (F31.9) Bipolar 1 disorder (H)  Comment: Chronic. Stable. I do not see any records from psych. [facility] 20/30  Plan:   -Monitor  mood and behaviors  -Monitor for worsening s/symptoms of concerns  -Monitor for changes in mobility, eating and sleeping patterns  -Continue buspirone 7.5mg BID  -Continue duloxetine 60mg daily  -Continue hydroxyzine 25mg BID  -Continue lamotrigine 100mg BID  -Trend periodically while TCU     (D64.9) Chronic anemia  Comment: Chronic. Baseline hgb~ 13  Plan:   -Monitor for bleeding risks  -Monitor for worsening s/symptoms of concerns  -Trend periodically while TCU     (H81.09) Meniere disease  Comment: Chronic. Followed with ENT--last visit back in 2022  Plan:  -Monitor for worsening s/symptoms of concerns  -ENT post TCU  -Continue prednisone 6mg daily (appears to be on this chronically since 2017 or so)  -Trend periodically while TCU     (L30.9) Dermatitis  Comment: Acute on chronic. Admitted with wound care to area.   Plan:  -Encourage repositioning  -Monitor for worsening s/symptoms of concerns  -Continue calmoseptine to area BID.     (E11.21) Type 2 diabetes mellitus with diabetic nephropathy, without long-term current use of insulin (H)  Comment: Chronic. Last A1c yesterday 4/3/25 was 6.8%. Goal <9%.   Plan:   -HOLD Weekly ozempic while on TCU  -Monitor for worsening s/symptoms of concerns  -Continue Empaglifozin 25mg daily  -Discontinued glipizide due to hypoglycemia risks  -Continue metformin 500mg daily with breakfast  -Monitor Blood Glucose BID. Update if Blood Glucose <70 and/or >450  -Trend periodically while TCU     (J44.9) Chronic obstructive pulmonary disease, unspecified COPD type (H)  (J45.20) Mild intermittent asthma without complication  Comment: Chronic. Stable.   Plan:   -Monitor respiratory status  -Monitor for worsening s/symptoms of concerns  -Trend periodically while TCU     (K59.01) Constipation  Comment: Acute on chronic. Suspect S/T polypharmacy. Abdominal xray on site revealed moderate stool burden.   Plan:  -Continue senna S 2 tabs BID  -Continue miralax daily scheduled  -Bisacodyl 10mg  rectal supp daily PRN  -Lactulose 20gm BID PRN--discontinue next week if non use  -Continue adequate hydration and ambulation practices.   -Monitor BM patterns     (N32.81) OAB  Comment: Chronic. Stable  Plan:  -Monitor urinary status  -Continue mirabegron 50mg daily  -Continue solenacin succinate 5mg daily  -Follow up with urology post TCU  -Trend periodically while TCU     (H04.123) Dry eyes  (H11.32) Conjunctival hemorrhage of left eye  Comment: Acute. She noted this few days ago. Suspect S/T constipation strain  Plan:  -Monitor for worsening s/symptoms of concerns  -Continue refresh tears BID and PRN  -Ophthalmology referral if worsens.      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   Plan:  -Continue miconazole powder BID  -Monitor for worsening s/symptoms of concerns     Electronically signed by:  Dr. Angeli Garcia DNP, APRN, FNP-C, WCS-C, EDS-C     Provider reviewed records from facility, and interpreted most recent imaging/lab work, and vital signs.   Acute and chronic conditions managed by writer. Have been reviewed during today's exam.            Sincerely,        Angeli Garcia, JUAN ANTONIO CNP    Electronically signed

## 2025-04-28 ENCOUNTER — TRANSITIONAL CARE UNIT VISIT (OUTPATIENT)
Dept: GERIATRICS | Facility: CLINIC | Age: 85
End: 2025-04-28
Payer: MEDICARE

## 2025-04-28 VITALS
OXYGEN SATURATION: 95 % | HEART RATE: 79 BPM | WEIGHT: 201.4 LBS | DIASTOLIC BLOOD PRESSURE: 81 MMHG | RESPIRATION RATE: 18 BRPM | HEIGHT: 66 IN | BODY MASS INDEX: 32.37 KG/M2 | TEMPERATURE: 97.5 F | SYSTOLIC BLOOD PRESSURE: 166 MMHG

## 2025-04-28 DIAGNOSIS — M48.00 SPINAL STENOSIS, UNSPECIFIED SPINAL REGION: Primary | ICD-10-CM

## 2025-04-28 DIAGNOSIS — N18.31 STAGE 3A CHRONIC KIDNEY DISEASE (H): ICD-10-CM

## 2025-04-28 DIAGNOSIS — N32.81 OAB (OVERACTIVE BLADDER): ICD-10-CM

## 2025-04-28 DIAGNOSIS — F31.9 BIPOLAR 1 DISORDER (H): ICD-10-CM

## 2025-04-28 DIAGNOSIS — H81.09 MENIERE'S DISEASE, UNSPECIFIED LATERALITY: ICD-10-CM

## 2025-04-28 DIAGNOSIS — E11.21 TYPE 2 DIABETES MELLITUS WITH DIABETIC NEPHROPATHY, WITHOUT LONG-TERM CURRENT USE OF INSULIN (H): ICD-10-CM

## 2025-04-28 DIAGNOSIS — I10 ESSENTIAL HYPERTENSION: ICD-10-CM

## 2025-04-28 PROCEDURE — 99305 1ST NF CARE MODERATE MDM 35: CPT | Performed by: FAMILY MEDICINE

## 2025-04-28 NOTE — LETTER
4/28/2025      Jumana Yang  [address]  [place] [address]        No notes on file      Sincerely,        Pricila Amezcua MD    Electronically signed

## 2025-04-28 NOTE — LETTER
4/28/2025      Jumana Yang  [address]  [place] [address]        No notes on file      Sincerely,        Pricila Amezcua MD    Electronically signed

## 2025-04-30 ENCOUNTER — TRANSITIONAL CARE UNIT VISIT (OUTPATIENT)
Dept: GERIATRICS | Facility: CLINIC | Age: 85
End: 2025-04-30
Payer: MEDICARE

## 2025-04-30 ENCOUNTER — ANCILLARY PROCEDURE (OUTPATIENT)
Dept: GENERAL RADIOLOGY | Facility: CLINIC | Age: 85
End: 2025-04-30
Attending: NURSE PRACTITIONER

## 2025-04-30 VITALS
HEART RATE: 77 BPM | WEIGHT: 201.4 LBS | SYSTOLIC BLOOD PRESSURE: 123 MMHG | RESPIRATION RATE: 18 BRPM | OXYGEN SATURATION: 94 % | TEMPERATURE: 97.5 F | BODY MASS INDEX: 32.51 KG/M2 | DIASTOLIC BLOOD PRESSURE: 65 MMHG

## 2025-04-30 DIAGNOSIS — M48.061 SPINAL STENOSIS OF LUMBAR REGION, UNSPECIFIED WHETHER NEUROGENIC CLAUDICATION PRESENT: ICD-10-CM

## 2025-04-30 DIAGNOSIS — E78.5 DYSLIPIDEMIA: ICD-10-CM

## 2025-04-30 DIAGNOSIS — F41.9 ANXIETY: ICD-10-CM

## 2025-04-30 DIAGNOSIS — F32.A DEPRESSION, UNSPECIFIED DEPRESSION TYPE: ICD-10-CM

## 2025-04-30 DIAGNOSIS — G83.4 CAUDA EQUINA COMPRESSION (H): ICD-10-CM

## 2025-04-30 DIAGNOSIS — I10 ESSENTIAL HYPERTENSION: ICD-10-CM

## 2025-04-30 DIAGNOSIS — F41.1 GAD (GENERALIZED ANXIETY DISORDER): ICD-10-CM

## 2025-04-30 DIAGNOSIS — F31.9 BIPOLAR 1 DISORDER (H): ICD-10-CM

## 2025-04-30 DIAGNOSIS — K59.01 SLOW TRANSIT CONSTIPATION: ICD-10-CM

## 2025-04-30 DIAGNOSIS — H04.123 DRY EYES: ICD-10-CM

## 2025-04-30 DIAGNOSIS — L30.9 DERMATITIS: ICD-10-CM

## 2025-04-30 DIAGNOSIS — H11.32 CONJUNCTIVAL HEMORRHAGE OF LEFT EYE: ICD-10-CM

## 2025-04-30 DIAGNOSIS — I89.0 LYMPHEDEMA: ICD-10-CM

## 2025-04-30 DIAGNOSIS — N18.31 STAGE 3A CHRONIC KIDNEY DISEASE (H): ICD-10-CM

## 2025-04-30 DIAGNOSIS — H81.09 MENIERE'S DISEASE, UNSPECIFIED LATERALITY: ICD-10-CM

## 2025-04-30 DIAGNOSIS — B36.9 FUNGAL DERMATITIS: ICD-10-CM

## 2025-04-30 DIAGNOSIS — E11.21 TYPE 2 DIABETES MELLITUS WITH DIABETIC NEPHROPATHY, WITHOUT LONG-TERM CURRENT USE OF INSULIN (H): ICD-10-CM

## 2025-04-30 DIAGNOSIS — R53.81 PHYSICAL DECONDITIONING: ICD-10-CM

## 2025-04-30 DIAGNOSIS — E66.01 CLASS 2 SEVERE OBESITY WITH BODY MASS INDEX (BMI) OF 35 TO 39.9 WITH SERIOUS COMORBIDITY (H): ICD-10-CM

## 2025-04-30 DIAGNOSIS — G89.4 CHRONIC PAIN SYNDROME: ICD-10-CM

## 2025-04-30 DIAGNOSIS — E66.812 CLASS 2 SEVERE OBESITY WITH BODY MASS INDEX (BMI) OF 35 TO 39.9 WITH SERIOUS COMORBIDITY (H): ICD-10-CM

## 2025-04-30 DIAGNOSIS — Z91.81 PERSONAL HISTORY OF FALL: ICD-10-CM

## 2025-04-30 DIAGNOSIS — D64.9 CHRONIC ANEMIA: ICD-10-CM

## 2025-04-30 DIAGNOSIS — J44.9 CHRONIC OBSTRUCTIVE PULMONARY DISEASE, UNSPECIFIED COPD TYPE (H): ICD-10-CM

## 2025-04-30 DIAGNOSIS — R09.89 CHEST CONGESTION: Primary | ICD-10-CM

## 2025-04-30 DIAGNOSIS — R06.89 DECREASED LUNG SOUNDS: ICD-10-CM

## 2025-04-30 DIAGNOSIS — N32.81 OAB (OVERACTIVE BLADDER): ICD-10-CM

## 2025-04-30 DIAGNOSIS — J45.20 MILD INTERMITTENT ASTHMA WITHOUT COMPLICATION: ICD-10-CM

## 2025-04-30 DIAGNOSIS — M48.00 SPINAL STENOSIS, UNSPECIFIED SPINAL REGION: ICD-10-CM

## 2025-04-30 DIAGNOSIS — R21 GROIN RASH: ICD-10-CM

## 2025-04-30 DIAGNOSIS — R09.89 CHEST CONGESTION: ICD-10-CM

## 2025-04-30 DIAGNOSIS — M62.81 GENERALIZED MUSCLE WEAKNESS: ICD-10-CM

## 2025-04-30 PROCEDURE — 99310 SBSQ NF CARE HIGH MDM 45: CPT | Performed by: NURSE PRACTITIONER

## 2025-04-30 PROCEDURE — 71045 X-RAY EXAM CHEST 1 VIEW: CPT

## 2025-04-30 RX ORDER — TRIAMTERENE AND HYDROCHLOROTHIAZIDE 37.5; 25 MG/1; MG/1
2 CAPSULE ORAL DAILY
Status: SHIPPED
Start: 2025-04-30

## 2025-04-30 RX ORDER — AMLODIPINE BESYLATE 5 MG/1
10 TABLET ORAL DAILY
Status: SHIPPED
Start: 2025-04-30

## 2025-04-30 RX ORDER — CARBOXYMETHYLCELLULOSE SODIUM 5 MG/ML
1 SOLUTION/ DROPS OPHTHALMIC 4 TIMES DAILY PRN
Status: SHIPPED
Start: 2025-04-30

## 2025-04-30 RX ORDER — HYDROXYZINE HYDROCHLORIDE 25 MG/1
25 TABLET, FILM COATED ORAL 2 TIMES DAILY PRN
Qty: 28 TABLET | Refills: 0 | Status: SHIPPED | OUTPATIENT
Start: 2025-04-30 | End: 2025-05-14

## 2025-04-30 RX ORDER — METFORMIN HYDROCHLORIDE 500 MG/1
500 TABLET, EXTENDED RELEASE ORAL 2 TIMES DAILY WITH MEALS
Status: SHIPPED
Start: 2025-04-30

## 2025-04-30 RX ORDER — ALBUTEROL SULFATE 90 UG/1
2 INHALANT RESPIRATORY (INHALATION) EVERY 4 HOURS PRN
Qty: 18 G | Refills: 0 | Status: SHIPPED | OUTPATIENT
Start: 2025-04-30

## 2025-04-30 NOTE — PROGRESS NOTES
[facility] GERIATRICS    Chief Complaint   Patient presents with    RECHECK     HPI:  Jumana Yang is a 84 year old  (1940), who is being seen today for an episodic care visit at: [facility] (TC[facility])([facility]) [[id number]]. Today's concern is: The primary encounter diagnosis was Chest congestion. Diagnoses of Dry eyes, Essential hypertension, Type 2 diabetes mellitus with diabetic nephropathy, without long-term current use of insulin (H), Lymphedema, Chronic pain syndrome, JANIE (generalized anxiety disorder), Physical deconditioning, Personal history of fall, Generalized muscle weakness, Spinal stenosis, unspecified spinal region, Cauda equina compression (H), Class 2 severe obesity with body mass index (BMI) of 35 to 39.9 with serious comorbidity (H), Dyslipidemia, Anxiety, Depression, unspecified depression type, Meniere's disease, unspecified laterality, Chronic obstructive pulmonary disease, unspecified COPD type (H), Bipolar 1 disorder (H), OAB (overactive bladder), Mild intermittent asthma without complication, Dermatitis, Spinal stenosis of lumbar region, unspecified whether neurogenic claudication present, Slow transit constipation, Fungal dermatitis, Stage 3a chronic kidney disease (H), Chronic anemia, Conjunctival hemorrhage of left eye, Groin rash, and Decreased lung sounds were also pertinent to this visit.    Staff updated provider of worsening edema present to bilateral lower extremities. Found patient sitting upright in wheelchair. Noted increased edema present to bilateral lower extremities. She has been using on TCU either TG compression stockings or else her home supply of stockings too. She remains very [place]. She denies any chest pain, palpitations, shortness of breath, LOPEZ, lightheadedness, dizziness, or cough. Denies any abdominal discomfort. Denies N&V. Denies B&B concerns. Denies dysuria or frequency. Denies loose or constipation. Reports only taking 1 tab BID vs  scheduled 2 tabs. Appetite good. Sleeping well. No complaints of pain reported. Care conference today with projected LCD 5/9. Participating well with therapies.     BP Readings from Last 3 Encounters:   04/30/25 136/62   04/30/25 123/65   04/28/25 (!) 166/81     Wt Readings from Last 5 Encounters:   04/30/25 91.4 kg (201 lb 6.4 oz)   04/30/25 91.4 kg (201 lb 6.4 oz)   04/28/25 91.4 kg (201 lb 6.4 oz)   04/24/25 92.7 kg (204 lb 6.4 oz)   04/22/25 92.7 kg (204 lb 6.4 oz)     Allergies, and PMH/PSH reviewed in EPIC today.  REVIEW OF SYSTEMS:  4 point ROS including Respiratory, CV, GI and , other than that noted in the HPI,  is negative     Objective:   /65   Pulse 77   Temp 97.5  F (36.4  C)   Resp 18   Wt 91.4 kg (201 lb 6.4 oz)   LMP  (LMP Unknown)   SpO2 94%   BMI 32.51 kg/m    GENERAL APPEARANCE:  Alert, in no distress, cooperative  ENT:  Mouth and posterior oropharynx normal, moist mucous membranes, "[employer]  EYES:  EOM, conjunctivae, lids, pupils and irises normal  NECK:  No adenopathy,masses or thyromegaly  RESP:  respiratory effort and palpation of chest normal, no respiratory distress, diminished breath sounds bibasilar  CV:  regular rate and rhythm, no murmur, rub, or gallop, peripheral edema 2-3+ in BLE  ABDOMEN:  normal bowel sounds, soft, nontender, no hepatosplenomegaly or other masses, no guarding or rebound  M/S:    Ambulates with walker. Wheelchair for long distance needs. Staff to assist for ADLs, transfers and cares  SKIN:  Inspection of skin and subcutaneous tissue baseline, Palpation of skin and subcutaneous tissue baseline  NEURO:   Cranial nerves 2-12 are normal tested and grossly at patient's baseline, no purposeful movement in upper and lower extremities  PSYCH:  oriented X 3, memory impaired , affect and mood normal         Most Recent 3 CBC's:  Recent Labs   Lab Test 04/23/25  1210 04/14/25  1036 04/10/25  0820   WBC 6.3 7.4 6.0   HGB 13.9 13.6 13.5   MCV 96 99 98    202 218      Most Recent 3 BMP's:  Recent Labs   Lab Test 04/23/25  1210 04/14/25  1036 04/10/25  0820    141 145   POTASSIUM 4.1 3.5 3.4   CHLORIDE 100 101 103   CO2 29 28 32*   BUN 19.9 18.0 21.9   CR 1.06* 0.98* 1.02*   ANIONGAP 13 12 10   MACY 9.7 9.2 9.7   * 125* 78     Most Recent 2 LFT's:  Recent Labs   Lab Test 04/14/25  1036 04/03/25  0945   AST 35 28   ALT 35 21   ALKPHOS 91 88   BILITOTAL 0.3 0.4     Most Recent Hemoglobin A1c:  Recent Labs   Lab Test 04/03/25  0945   A1C 6.9*     Most Recent Anemia Panel:  Recent Labs   Lab Test 04/23/25  1210 04/10/25  0820 04/03/25  0945 04/13/22  0559 04/10/22  1123   WBC 6.3   < > 8.6   < >  --    HGB 13.9   < > 12.9   < >  --    HCT 44.9   < > 42.4   < >  --    MCV 96   < > 95   < >  --       < > 253   < >  --    IRON  --   --   --   --  46   B12  --   --  987  --   --     < > = values in this interval not displayed.        (R53.81) Physical deconditioning  (primary encounter diagnosis)  (M62.81) Generalized muscle weakness  Comment: Acute on chronic. S/T below diagnosis. SLUMS 20/30  Plan:   -Continue Physical therapy and Occupational therapy as directed  -SW to remain involved for safe discharge planning needs     (Z91.81) Personal history of fall  (G89.4) Chronic pain syndrome  (G83.4) Cauda equina compression (H)  (M48.00) Spinal stenosis, unspecified spinal region  Comment: Acute on chronic. Per hospital records-presenting for leg pain and weakness following a fall. She is clinically stable. She has a known history of lumbar spinal stenosis as well as previously demonstrated L5 nerve impingement. By her report, she has had a chronic worsening of her functional status over time. The lower extremity weakness today appeared to precede her fall. There appears to have been some progression of lumbar spinal canal stenosis compared to the MRI from 9/25/24.   Plan:   -Monitor pain complaints as directed  -Follow up with neurosurgery as directed   -Continue  vitamin D 2000U daily  -Continue lyrica 100mg TID (max dose for CrCl)  -Continue Tylenol 1000mg TID and daily PRN  -Continue oxycodone to 5mg daily PRN x 2 weeks then discontinue--due to stop 5/7/25  -BMP, CBC, CRP and procalcitonin due 5/2/25     (I10) Essential hypertension  (E66.812,  E66.01) Class 2 severe obesity with body mass index (BMI) of 35 to 39.9 with serious comorbidity (H)  (E78.5) Dyslipidemia  (I89.0) Lymphedema  (N18.31) Stage 3a chronic kidney disease (H)  (I89.0) Lymphedema  Comment: Chronic. Based on JNC-8 goals,  patients age of 84 year old, presence of diabetes or CKD, and goals of care goal BP is  <140/90 mm Hg. Noted patients BP is higher than goal and will adjust plan of care by..  Plan:   -Monitor BP and HR  -Continue rosuvastatin 10mg daily  -Start OT lymphedema therapy eval and tx to assist with BLE edema  -Continue amlodipine 10mg daily. HOLD if SBP<100  -Increase triamterene-hydrochlorothiazide to 2 capsules of 37.5-25mg=75-50mg daily.. HOLD if SBP<100  -BMP, CBC, CRP and procalcitonin due 5/2/25          (F41.9) Anxiety  (F32.A) Depression, unspecified depression type  (F31.9) Bipolar 1 disorder (H)  Comment: Chronic. Stable. I do not see any records from psych. UMS 20/30  Plan:   -Monitor mood and behaviors  -Monitor for worsening s/symptoms of concerns  -Monitor for changes in mobility, eating and sleeping patterns  -Continue buspirone 7.5mg BID  -Continue duloxetine 60mg daily  -Continue hydroxyzine 25mg BID and BID PRN (PRN doses x 14 day duration)  -Continue lamotrigine 100mg BID  -BMP, CBC, CRP and procalcitonin due 5/2/25     (D64.9) Chronic anemia  Comment: Chronic. Baseline hgb~ 13  Plan:   -Monitor for bleeding risks  -Monitor for worsening s/symptoms of concerns  -BMP, CBC, CRP and procalcitonin due 5/2/25     (H81.09) Meniere disease  Comment: Chronic. Followed with ENT--last visit back in 2022  Plan:  -Monitor for worsening s/symptoms of concerns  -ENT post  TCU  -Continue prednisone 6mg daily (appears to be on this chronically since 2017 or so)  -BMP, CBC, CRP and procalcitonin due 5/2/25     (L30.9) Dermatitis  Comment: Acute on chronic. Admitted with wound care to area.   Plan:  -Encourage repositioning  -Monitor for worsening s/symptoms of concerns  -Continue calmoseptine to area BID.     (E11.21) Type 2 diabetes mellitus with diabetic nephropathy, without long-term current use of insulin (H)  Comment: Chronic. Last A1c yesterday 4/3/25 was 6.8%. Goal <9%.   Plan:   -HOLD Weekly ozempic while on TCU  -Monitor for worsening s/symptoms of concerns  -Continue Empaglifozin 25mg daily  -Discontinued glipizide due to hypoglycemia risks  -Increase metformin to 500mg BID with meals  -Monitor Blood Glucose BID. Update if Blood Glucose <70 and/or >450  -BMP, CBC, CRP and procalcitonin due 5/2/25          (J44.9) Chronic obstructive pulmonary disease, unspecified COPD type (H)  (J45.20) Mild intermittent asthma without complication  (R06.89) Diminished lung sounds  (R09.89) Chest congestion  Comment: Chronic. Lungs diminished today. Edema present to bilateral lower extremities now.   Plan:   -Monitor respiratory status  -Add Albuterol inhaler PRN  -CXR 1 view to rule out acute concerns given diminished lungs--results reviewed and in agreement of atelectasis indicated  -Encourage IS every 4 hours while awake  -Monitor for worsening s/symptoms of concerns  -BMP, CBC, CRP and procalcitonin due 5/2/25    Narrative & Impression   EXAM: XR CHEST PORT 1 VIEW  LOCATION: [facility]  DATE: 4/30/2025     INDICATION:  Chest congestion.  COMPARISON: None.                                                                      IMPRESSION: Minimal left basilar opacities with differentials of atelectasis versus infectious / inflammatory process. Remaining lungs are clear. No pleural effusion of significant size. Normal heart size.        (K59.01) Constipation  Comment:  Acute on chronic. Suspect S/T polypharmacy. Abdominal xray on site revealed moderate stool burden. Stable   Plan:  -Change senna S to 1 tab BID and BID PRN  -Continue miralax daily scheduled  -Bisacodyl 10mg rectal supp daily PRN  -Discontinue lactulose given non use  -Continue adequate hydration and ambulation practices.   -Monitor BM patterns     (N32.81) OAB  Comment: Chronic. Stable  Plan:  -Monitor urinary status  -Continue mirabegron 50mg daily  -Continue solenacin succinate 5mg daily  -Follow up with urology post TCU  -BMP, CBC, CRP and procalcitonin due 5/2/25     (H04.123) Dry eyes  (H11.32) Conjunctival hemorrhage of left eye  Comment: Acute. She noted this few days ago. Suspect S/T constipation strain---RESOLVED  Plan:  -Monitor for worsening s/symptoms of concerns  -change refresh tears to QID PRN  -Ophthalmology referral if worsens.      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   Plan:  -Continue miconazole powder BID  -Monitor for worsening s/symptoms of concerns    52 minutes spent on the date of the encounter doing chart review, history and exam, PMH, documentation and further activities as noted above. Collaboration with nursing staff on site, clinical managers, and therapies were completed on site today.     Electronically signed:   Dr. Angeli Garcia DNP, APRN, FNP-C, WCS-C, EDS-C    Provider reviewed records from facility, and interpreted most recent imaging/lab work, and vital signs.   Acute and chronic conditions managed by writer. Have been reviewed during today's exam.

## 2025-04-30 NOTE — PROGRESS NOTES
[facility] GERIATRICS    No chief complaint on file.    HPI:  Jumana Yang is a 84 year old  (1940), who is being seen today for an episodic care visit at: No question data found.. Today's concern is: ***    Allergies, and PMH/PSH reviewed in [facility] today.  REVIEW OF SYSTEMS:  {[id number]}    Objective:   LMP  (LMP Unknown)   {Nursing home physical exam :132980}    {fgslab:[id number]}    (R53.81) Physical deconditioning  (primary encounter diagnosis)  (M62.81) Generalized muscle weakness  Comment: Acute on chronic. S/T below diagnosis. UMS 20/30  Plan:   -Continue Physical therapy and Occupational therapy as directed  -SW to remain involved for safe discharge planning needs     (Z91.81) Personal history of fall  (G89.4) Chronic pain syndrome  (G83.4) Cauda equina compression (H)  (M48.00) Spinal stenosis, unspecified spinal region  Comment: Acute on chronic. Per hospital records-presenting for leg pain and weakness following a fall. She is clinically stable. She has a known history of lumbar spinal stenosis as well as previously demonstrated L5 nerve impingement. By her report, she has had a chronic worsening of her functional status over time. The lower extremity weakness today appeared to precede her fall. There appears to have been some progression of lumbar spinal canal stenosis compared to the MRI from 9/25/24.   Plan:   -Monitor pain complaints as directed  -Follow up with neurosurgery as directed   -Continue vitamin D 2000U daily  -Continue lyrica 100mg TID (max dose for CrCl)  -Continue Tylenol 1000mg TID and daily PRN  -Continue oxycodone to 5mg daily PRN x 2 weeks then discontinue--due to stop 5/7/25  -BMP and CBC due 5/2/25     (I10) Essential hypertension  (E66.812,  E66.01) Class 2 severe obesity with body mass index (BMI) of 35 to 39.9 with serious comorbidity (H)  (E78.5) Dyslipidemia  (I89.0) Lymphedema  (N18.31) Stage 3a chronic kidney disease (H)  Lymphedema  Comment: Chronic. Based on  JNC-8 goals,  patients age of 84 year old, presence of diabetes or CKD, and goals of care goal BP is  <140/90 mm Hg. Patient is stable with current plan of care and routine assessment..  Plan:   -Monitor BP and HR  -Continue rosuvastatin 10mg daily  -Start OT lymphedema therapy eval and tx to assist with BLE edema  -Increase amlodipine to 10mg daily. HOLD if SBP<100  -CXR 1 view to rule out acute concerns given diminished lungs  -Continue triamterene-hydrochlorothiazide 37.5-25mg daily.. HOLD if SBP<100  -BMP and CBC due 5/2/25         (F41.9) Anxiety  (F32.A) Depression, unspecified depression type  (F31.9) Bipolar 1 disorder (H)  Comment: Chronic. Stable. I do not see any records from psych. UMS 20/30  Plan:   -Monitor mood and behaviors  -Monitor for worsening s/symptoms of concerns  -Monitor for changes in mobility, eating and sleeping patterns  -Continue buspirone 7.5mg BID  -Continue duloxetine 60mg daily  -Continue hydroxyzine 25mg BID and BID PRN (PRN doses x 14 day duration)  -Continue lamotrigine 100mg BID  -BMP and CBC due 5/2/25    (D64.9) Chronic anemia  Comment: Chronic. Baseline hgb~ 13  Plan:   -Monitor for bleeding risks  -Monitor for worsening s/symptoms of concerns  -BMP and CBC due 5/2/25     (H81.09) Meniere disease  Comment: Chronic. Followed with ENT--last visit back in 2022  Plan:  -Monitor for worsening s/symptoms of concerns  -ENT post TCU  -Continue prednisone 6mg daily (appears to be on this chronically since 2017 or so)  -BMP and CBC due 5/2/25     (L30.9) Dermatitis  Comment: Acute on chronic. Admitted with wound care to area.   Plan:  -Encourage repositioning  -Monitor for worsening s/symptoms of concerns  -Continue calmoseptine to area BID.     (E11.21) Type 2 diabetes mellitus with diabetic nephropathy, without long-term current use of insulin (H)  Comment: Chronic. Last A1c yesterday 4/3/25 was 6.8%. Goal <9%.   Plan:   -HOLD Weekly ozempic while on TCU  -Monitor for worsening  s/symptoms of concerns  -Continue Empaglifozin 25mg daily  -Discontinued glipizide due to hypoglycemia risks  -Increase metformin to 500mg BID with meals  -Monitor Blood Glucose BID. Update if Blood Glucose <70 and/or >450  -BMP and CBC due 5/2/25         (J44.9) Chronic obstructive pulmonary disease, unspecified COPD type (H)  (J45.20) Mild intermittent asthma without complication  Comment: Chronic. Stable.   Plan:   -Monitor respiratory status  -Add Albuterol inhaler PRN  -Monitor for worsening s/symptoms of concerns  -BMP and CBC due 5/2/25     (K59.01) Constipation  Comment: Acute on chronic. Suspect S/T polypharmacy. Abdominal xray on site revealed moderate stool burden.   Plan:  -Change senna S to 1 tab BID and BID PRN  -Continue miralax daily scheduled  -Bisacodyl 10mg rectal supp daily PRN  -Discontinue lactulose given non use  -Continue adequate hydration and ambulation practices.   -Monitor BM patterns     (N32.81) OAB  Comment: Chronic. Stable  Plan:  -Monitor urinary status  -Continue mirabegron 50mg daily  -Continue solenacin succinate 5mg daily  -Follow up with urology post TCU  -BMP and CBC due 5/2/25     (H04.123) Dry eyes  (H11.32) Conjunctival hemorrhage of left eye  Comment: Acute. She noted this few days ago. Suspect S/T constipation strain---RESOLVED  Plan:  -Monitor for worsening s/symptoms of concerns  -change refresh tears to QID PRN  -Ophthalmology referral if worsens.      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   Plan:  -Continue miconazole powder BID  -Monitor for worsening s/symptoms of concerns

## 2025-04-30 NOTE — LETTER
4/30/2025      Jumana Yang  [address]  [place] [address]        [facility] GERIATRICS    Chief Complaint   Patient presents with     RECHECK     HPI:  Jumana Yang is a 84 year old  (1940), who is being seen today for an episodic care visit at: [facility] (TCU)([facility]) [[id number]]. Today's concern is: The primary encounter diagnosis was Chest congestion. Diagnoses of Dry eyes, Essential hypertension, Type 2 diabetes mellitus with diabetic nephropathy, without long-term current use of insulin (H), Lymphedema, Chronic pain syndrome, JANIE (generalized anxiety disorder), Physical deconditioning, Personal history of fall, Generalized muscle weakness, Spinal stenosis, unspecified spinal region, Cauda equina compression (H), Class 2 severe obesity with body mass index (BMI) of 35 to 39.9 with serious comorbidity (H), Dyslipidemia, Anxiety, Depression, unspecified depression type, Meniere's disease, unspecified laterality, Chronic obstructive pulmonary disease, unspecified COPD type (H), Bipolar 1 disorder (H), OAB (overactive bladder), Mild intermittent asthma without complication, Dermatitis, Spinal stenosis of lumbar region, unspecified whether neurogenic claudication present, Slow transit constipation, Fungal dermatitis, Stage 3a chronic kidney disease (H), Chronic anemia, Conjunctival hemorrhage of left eye, Groin rash, and Decreased lung sounds were also pertinent to this visit.    Staff updated provider of worsening edema present to bilateral lower extremities. Found patient sitting upright in wheelchair. Noted increased edema present to bilateral lower extremities. She has been using on TCU either TG compression stockings or else her home supply of stockings too. She remains very [place]. She denies any chest pain, palpitations, shortness of breath, LOPEZ, lightheadedness, dizziness, or cough. Denies any abdominal discomfort. Denies N&V. Denies B&B concerns.  Denies dysuria or frequency. Denies loose or constipation. Reports only taking 1 tab BID vs scheduled 2 tabs. Appetite good. Sleeping well. No complaints of pain reported. Care conference today with projected LCD 5/9. Participating well with therapies.     BP Readings from Last 3 Encounters:   04/30/25 136/62   04/30/25 123/65   04/28/25 (!) 166/81     Wt Readings from Last 5 Encounters:   04/30/25 91.4 kg (201 lb 6.4 oz)   04/30/25 91.4 kg (201 lb 6.4 oz)   04/28/25 91.4 kg (201 lb 6.4 oz)   04/24/25 92.7 kg (204 lb 6.4 oz)   04/22/25 92.7 kg (204 lb 6.4 oz)     Allergies, and PMH/PSH reviewed in EPIC today.  REVIEW OF SYSTEMS:  4 point ROS including Respiratory, CV, GI and , other than that noted in the HPI,  is negative     Objective:   /65   Pulse 77   Temp 97.5  F (36.4  C)   Resp 18   Wt 91.4 kg (201 lb 6.4 oz)   LMP  (LMP Unknown)   SpO2 94%   BMI 32.51 kg/m    GENERAL APPEARANCE:  Alert, in no distress, cooperative  ENT:  Mouth and posterior oropharynx normal, moist mucous membranes, [place]  EYES:  EOM, conjunctivae, lids, pupils and irises normal  NECK:  No adenopathy,masses or thyromegaly  RESP:  respiratory effort and palpation of chest normal, no respiratory distress, diminished breath sounds bibasilar  CV:  regular rate and rhythm, no murmur, rub, or gallop, peripheral edema 2-3+ in BLE  ABDOMEN:  normal bowel sounds, soft, nontender, no hepatosplenomegaly or other masses, no guarding or rebound  M/S:    Ambulates with walker. Wheelchair for long distance needs. Staff to assist for ADLs, transfers and cares  SKIN:  Inspection of skin and subcutaneous tissue baseline, Palpation of skin and subcutaneous tissue baseline  NEURO:   Cranial nerves 2-12 are normal tested and grossly at patient's baseline, no purposeful movement in upper and lower extremities  PSYCH:  oriented X 3, memory impaired , affect and mood normal         Most Recent 3 CBC's:  Recent Labs   Lab Test 04/23/25  1210  04/14/25  1036 04/10/25  0820   WBC 6.3 7.4 6.0   HGB 13.9 13.6 13.5   MCV 96 99 98    202 218     Most Recent 3 BMP's:  Recent Labs   Lab Test 04/23/25  1210 04/14/25  1036 04/10/25  0820    141 145   POTASSIUM 4.1 3.5 3.4   CHLORIDE 100 101 103   CO2 29 28 32*   BUN 19.9 18.0 21.9   CR 1.06* 0.98* 1.02*   ANIONGAP 13 12 10   MACY 9.7 9.2 9.7   * 125* 78     Most Recent 2 LFT's:  Recent Labs   Lab Test 04/14/25  1036 04/03/25  0945   AST 35 28   ALT 35 21   ALKPHOS 91 88   BILITOTAL 0.3 0.4     Most Recent Hemoglobin A1c:  Recent Labs   Lab Test 04/03/25  0945   A1C 6.9*     Most Recent Anemia Panel:  Recent Labs   Lab Test 04/23/25  1210 04/10/25  0820 04/03/25  0945 04/13/22  0559 04/10/22  1123   WBC 6.3   < > 8.6   < >  --    HGB 13.9   < > 12.9   < >  --    HCT 44.9   < > 42.4   < >  --    MCV 96   < > 95   < >  --       < > 253   < >  --    IRON  --   --   --   --  46   B12  --   --  987  --   --     < > = values in this interval not displayed.        (R53.81) Physical deconditioning  (primary encounter diagnosis)  (M62.81) Generalized muscle weakness  Comment: Acute on chronic. S/T below diagnosis. [facility] 20/30  Plan:   -Continue Physical therapy and Occupational therapy as directed  -SW to remain involved for safe discharge planning needs     (Z91.81) Personal history of fall  (G89.4) Chronic pain syndrome  (G83.4) Cauda equina compression (H)  (M48.00) Spinal stenosis, unspecified spinal region  Comment: Acute on chronic. Per hospital records-presenting for leg pain and weakness following a fall. She is clinically stable. She has a known history of lumbar spinal stenosis as well as previously demonstrated L5 nerve impingement. By her report, she has had a chronic worsening of her functional status over time. The lower extremity weakness today appeared to precede her fall. There appears to have been some progression of lumbar spinal canal stenosis compared to the MRI from 9/25/24.    Plan:   -Monitor pain complaints as directed  -Follow up with neurosurgery as directed   -Continue vitamin D 2000U daily  -Continue lyrica 100mg TID (max dose for CrCl)  -Continue Tylenol 1000mg TID and daily PRN  -Continue oxycodone to 5mg daily PRN x 2 weeks then discontinue--due to stop 5/7/25  -BMP, CBC, CRP and procalcitonin due 5/2/25     (I10) Essential hypertension  (E66.812,  E66.01) Class 2 severe obesity with body mass index (BMI) of 35 to 39.9 with serious comorbidity (H)  (E78.5) Dyslipidemia  (I89.0) Lymphedema  (N18.31) Stage 3a chronic kidney disease (H)  (I89.0) Lymphedema  Comment: Chronic. Based on JNC-8 goals,  patients age of 84 year old, presence of diabetes or CKD, and goals of care goal BP is  <140/90 mm Hg. Noted patients BP is higher than goal and will adjust plan of care by..  Plan:   -Monitor BP and HR  -Continue rosuvastatin 10mg daily  -Start OT lymphedema therapy eval and tx to assist with BLE edema  -Continue amlodipine 10mg daily. HOLD if SBP<100  -Increase triamterene-hydrochlorothiazide to 2 capsules of 37.5-25mg=75-50mg daily.. HOLD if SBP<100  -BMP, CBC, CRP and procalcitonin due 5/2/25          (F41.9) Anxiety  (F32.A) Depression, unspecified depression type  (F31.9) Bipolar 1 disorder (H)  Comment: Chronic. Stable. I do not see any records from psych. [facility] 20/30  Plan:   -Monitor mood and behaviors  -Monitor for worsening s/symptoms of concerns  -Monitor for changes in mobility, eating and sleeping patterns  -Continue buspirone 7.5mg BID  -Continue duloxetine 60mg daily  -Continue hydroxyzine 25mg BID and BID PRN (PRN doses x 14 day duration)  -Continue lamotrigine 100mg BID  -BMP, CBC, CRP and procalcitonin due 5/2/25     (D64.9) Chronic anemia  Comment: Chronic. Baseline hgb~ 13  Plan:   -Monitor for bleeding risks  -Monitor for worsening s/symptoms of concerns  -BMP, CBC, CRP and procalcitonin due 5/2/25     (H81.09) Meniere disease  Comment: Chronic. Followed with  ENT--last visit back in 2022  Plan:  -Monitor for worsening s/symptoms of concerns  -ENT post TCU  -Continue prednisone 6mg daily (appears to be on this chronically since 2017 or so)  -BMP, CBC, CRP and procalcitonin due 5/2/25     (L30.9) Dermatitis  Comment: Acute on chronic. Admitted with wound care to area.   Plan:  -Encourage repositioning  -Monitor for worsening s/symptoms of concerns  -Continue calmoseptine to area BID.     (E11.21) Type 2 diabetes mellitus with diabetic nephropathy, without long-term current use of insulin (H)  Comment: Chronic. Last A1c yesterday 4/3/25 was 6.8%. Goal <9%.   Plan:   -HOLD Weekly ozempic while on TCU  -Monitor for worsening s/symptoms of concerns  -Continue Empaglifozin 25mg daily  -Discontinued glipizide due to hypoglycemia risks  -Increase metformin to 500mg BID with meals  -Monitor Blood Glucose BID. Update if Blood Glucose <70 and/or >450  -BMP, CBC, CRP and procalcitonin due 5/2/25          (J44.9) Chronic obstructive pulmonary disease, unspecified COPD type (H)  (J45.20) Mild intermittent asthma without complication  (R06.89) Diminished lung sounds  (R09.89) Chest congestion  Comment: Chronic. Lungs diminished today. Edema present to bilateral lower extremities now.   Plan:   -Monitor respiratory status  -Add Albuterol inhaler PRN  -CXR 1 view to rule out acute concerns given diminished lungs--results reviewed and in agreement of atelectasis indicated  -Encourage IS every 4 hours while awake  -Monitor for worsening s/symptoms of concerns  -BMP, CBC, CRP and procalcitonin due 5/2/25    Narrative & Impression   EXAM: XR CHEST PORT 1 VIEW  LOCATION: [facility]  DATE: 4/30/2025     INDICATION:  Chest congestion.  COMPARISON: None.                                                                      IMPRESSION: Minimal left basilar opacities with differentials of atelectasis versus infectious / inflammatory process. Remaining lungs are clear. No  pleural effusion of significant size. Normal heart size.        (K59.01) Constipation  Comment: Acute on chronic. Suspect S/T polypharmacy. Abdominal xray on site revealed moderate stool burden. Stable   Plan:  -Change senna S to 1 tab BID and BID PRN  -Continue miralax daily scheduled  -Bisacodyl 10mg rectal supp daily PRN  -Discontinue lactulose given non use  -Continue adequate hydration and ambulation practices.   -Monitor BM patterns     (N32.81) OAB  Comment: Chronic. Stable  Plan:  -Monitor urinary status  -Continue mirabegron 50mg daily  -Continue solenacin succinate 5mg daily  -Follow up with urology post TCU  -BMP, CBC, CRP and procalcitonin due 5/2/25     (H04.123) Dry eyes  (H11.32) Conjunctival hemorrhage of left eye  Comment: Acute. She noted this few days ago. Suspect S/T constipation strain---RESOLVED  Plan:  -Monitor for worsening s/symptoms of concerns  -change refresh tears to QID PRN  -Ophthalmology referral if worsens.      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   Plan:  -Continue miconazole powder BID  -Monitor for worsening s/symptoms of concerns    52 minutes spent on the date of the encounter doing chart review, history and exam, PMH, documentation and further activities as noted above. Collaboration with nursing staff on site, clinical managers, and therapies were completed on site today.     Electronically signed:   Dr. Angeli Garcia DNP, APRN, FNP-C, WCS-C, EDS-C    Provider reviewed records from facility, and interpreted most recent imaging/lab work, and vital signs.   Acute and chronic conditions managed by writer. Have been reviewed during today's exam.           Sincerely,        Angeli Garcia, JUAN ANTONIO CNP    Electronically signed

## 2025-05-01 ENCOUNTER — LAB REQUISITION (OUTPATIENT)
Dept: LAB | Facility: CLINIC | Age: 85
End: 2025-05-01
Payer: MEDICARE

## 2025-05-01 DIAGNOSIS — J81.0 ACUTE PULMONARY EDEMA (H): ICD-10-CM

## 2025-05-01 DIAGNOSIS — I10 ESSENTIAL (PRIMARY) HYPERTENSION: ICD-10-CM

## 2025-05-01 DIAGNOSIS — N18.30 CHRONIC KIDNEY DISEASE, STAGE 3 UNSPECIFIED (H): ICD-10-CM

## 2025-05-01 DIAGNOSIS — D64.9 ANEMIA, UNSPECIFIED: ICD-10-CM

## 2025-05-01 NOTE — PROGRESS NOTES
"[facility] GERIATRICS    No chief complaint on file.    HPI:  Jumana Yang is a 84 year old  (1940), who is being seen today for an episodic care visit at: No question data found.. Today's concern is: The primary encounter diagnosis was Dry eyes. Diagnoses of Essential hypertension, Chest congestion, Lymphedema, Type 2 diabetes mellitus with diabetic nephropathy, without long-term current use of insulin (H), Chronic pain syndrome, JANIE (generalized anxiety disorder), Physical deconditioning, Personal history of fall, Generalized muscle weakness, Spinal stenosis, unspecified spinal region, Cauda equina compression (H), Class 2 severe obesity with body mass index (BMI) of 35 to 39.9 with serious comorbidity (H), Anxiety, Dyslipidemia, Depression, unspecified depression type, Chronic obstructive pulmonary disease, unspecified COPD type (H), Meniere's disease, unspecified laterality, Bipolar 1 disorder (H), OAB (overactive bladder), Mild intermittent asthma without complication, Dermatitis, Spinal stenosis of lumbar region, unspecified whether neurogenic claudication present, Slow transit constipation, Stage 3a chronic kidney disease (H), Fungal dermatitis, Chronic anemia, Conjunctival hemorrhage of left eye, Groin rash, and Decreased lung sounds were also pertinent to this visit.    ***    BP Readings from Last 3 Encounters:   04/30/25 136/62   04/30/25 123/65   04/28/25 (!) 166/81     Wt Readings from Last 5 Encounters:   04/30/25 91.4 kg (201 lb 6.4 oz)   04/30/25 91.4 kg (201 lb 6.4 oz)   04/28/25 91.4 kg (201 lb 6.4 oz)   04/24/25 92.7 kg (204 lb 6.4 oz)   04/22/25 92.7 kg (204 lb 6.4 oz)     Allergies, and PMH/PSH reviewed in EPIC today.  REVIEW OF SYSTEMS:  4 point ROS including Respiratory, CV, GI and , other than that noted in the HPI,  is negative    Objective:   LMP  (LMP Unknown)   GENERAL APPEARANCE:  {[facility] GENERAL APPEARANCE:365960}  ENT:  {[facility] ENT PE:153108::\"Mouth and " "posterior oropharynx normal, moist mucous membranes\"}  EYES:  {[facility] EYES PE :061520::\"EOM, conjunctivae, lids, pupils and irises normal\"}  NECK:  {[facility] NECK PE:228408::\"No adenopathy,masses or thyromegaly\"}  RESP:  {[facility] RESP PE:924266}  CV:  {[facility] CV PE:476520}  ABDOMEN:  {[facility] GI PE:137739::\"normal bowel sounds, soft, nontender, no hepatosplenomegaly or other masses\"}  M/S:   {[facility] M/S PE:074233}  SKIN:  {[facility] SKIN PE:729743}  NEURO:   {[facility] NEURO PE:172894}  PSYCH:  {[facility] PSYCH PE:680348}    Most Recent 3 CBC's:  Recent Labs   Lab Test 04/23/25  1210 04/14/25  1036 04/10/25  0820   WBC 6.3 7.4 6.0   HGB 13.9 13.6 13.5   MCV 96 99 98    202 218     Most Recent 3 BMP's:  Recent Labs   Lab Test 04/23/25  1210 04/14/25  1036 04/10/25  0820    141 145   POTASSIUM 4.1 3.5 3.4   CHLORIDE 100 101 103   CO2 29 28 32*   BUN 19.9 18.0 21.9   CR 1.06* 0.98* 1.02*   ANIONGAP 13 12 10   MACY 9.7 9.2 9.7   * 125* 78     Most Recent 2 LFT's:  Recent Labs   Lab Test 04/14/25  1036 04/03/25  0945   AST 35 28   ALT 35 21   ALKPHOS 91 88   BILITOTAL 0.3 0.4     Most Recent 3 BNP's:No lab results found.  Most Recent Hemoglobin A1c:  Recent Labs   Lab Test 04/03/25  0945   A1C 6.9*     Most Recent ESR & CRP:  Recent Labs   Lab Test 02/01/24  0653 01/29/24  0912 07/03/22  2204 03/07/22  1354   SED  --   --   --  5   CRP  --   --  0.6 3.5*   CRPI 19.10*   < >  --   --     < > = values in this interval not displayed.     Most Recent Anemia Panel:  Recent Labs   Lab Test 04/23/25  1210 04/10/25  0820 04/03/25  0945 04/13/22  0559 04/10/22  1123   WBC 6.3   < > 8.6   < >  --    HGB 13.9   < > 12.9   < >  --    HCT 44.9   < > 42.4   < >  --    MCV 96   < > 95   < >  --       < > 253   < >  --    IRON  --   --   --   --  46   B12  --   --  987  --   --     < > = values in this interval not displayed.       Assessment/Plan:  (R53.81) Physical " deconditioning  (primary encounter diagnosis)  (M62.81) Generalized muscle weakness  Comment: Acute on chronic. S/T below diagnosis. [facility] 20/30  Plan:   -Continue Physical therapy and Occupational therapy as directed  -SW to remain involved for safe discharge planning needs     (Z91.81) Personal history of fall  (G89.4) Chronic pain syndrome  (G83.4) Cauda equina compression (H)  (M48.00) Spinal stenosis, unspecified spinal region  Comment: Acute on chronic. Per hospital records-presenting for leg pain and weakness following a fall. She is clinically stable. She has a known history of lumbar spinal stenosis as well as previously demonstrated L5 nerve impingement. By her report, she has had a chronic worsening of her functional status over time. The lower extremity weakness today appeared to precede her fall. There appears to have been some progression of lumbar spinal canal stenosis compared to the MRI from 9/25/24.   Plan:   -Monitor pain complaints as directed  -Follow up with neurosurgery as directed   -Continue vitamin D 2000U daily  -Continue lyrica 100mg TID (max dose for CrCl)  -Continue Tylenol 1000mg TID and daily PRN  -Continue oxycodone to 5mg daily PRN x 2 weeks then discontinue--due to stop 5/7/25  -BMP, CBC, CRP and procalcitonin due 5/2/25--results pending  -CMP and CBC due 5/7/25     (I10) Essential hypertension  (E66.812,  E66.01) Class 2 severe obesity with body mass index (BMI) of 35 to 39.9 with serious comorbidity (H)  (E78.5) Dyslipidemia  (I89.0) Lymphedema  (N18.31) Stage 3a chronic kidney disease (H)  (I89.0) Lymphedema  Comment: Chronic. Based on JNC-8 goals,  patients age of 84 year old, presence of diabetes or CKD, and goals of care goal BP is  <140/90 mm Hg. Noted patients BP is higher than goal and will adjust plan of care by..  Plan:   -Monitor BP and HR  -Continue rosuvastatin 10mg daily  -Continue OT lymphedema therapy eval and tx to assist with BLE edema  -Continue amlodipine 10mg  daily. HOLD if SBP<100  -Continue triamterene-hydrochlorothiazide to 2 capsules of 37.5-25mg=75-50mg daily.. HOLD if SBP<100  -BMP, CBC, CRP and procalcitonin due 5/2/25--results pending  -CMP and CBC due 5/7/25     (F41.9) Anxiety  (F32.A) Depression, unspecified depression type  (F31.9) Bipolar 1 disorder (H)  Comment: Chronic. Stable. I do not see any records from psych. [facility] 20/30  Plan:   -Monitor mood and behaviors  -Monitor for worsening s/symptoms of concerns  -Monitor for changes in mobility, eating and sleeping patterns  -Continue buspirone 7.5mg BID  -Continue duloxetine 60mg daily  -Continue hydroxyzine 25mg BID and BID PRN (PRN doses x 14 day duration)  -Continue lamotrigine 100mg BID  -BMP, CBC, CRP and procalcitonin due 5/2/25--results pending  -CMP and CBC due 5/7/25     (D64.9) Chronic anemia  Comment: Chronic. Baseline hgb~ 13  Plan:   -Monitor for bleeding risks  -Monitor for worsening s/symptoms of concerns  -BMP, CBC, CRP and procalcitonin due 5/2/25--results pending  -CMP and CBC due 5/7/25     (H81.09) Meniere disease  Comment: Chronic. Followed with ENT--last visit back in 2022  Plan:  -Monitor for worsening s/symptoms of concerns  -ENT post TCU  -Continue prednisone 6mg daily (appears to be on this chronically since 2017 or so)  -BMP, CBC, CRP and procalcitonin due 5/2/25--results pending  -CMP and CBC due 5/7/25     (L30.9) Dermatitis  Comment: Acute on chronic. Admitted with wound care to area.   Plan:  -Encourage repositioning  -Monitor for worsening s/symptoms of concerns  -Continue calmoseptine to area BID.     (E11.21) Type 2 diabetes mellitus with diabetic nephropathy, without long-term current use of insulin (H)  Comment: Chronic. Last A1c 4/3/25 was 6.8%. Goal <9%.   Plan:   -HOLD Weekly ozempic while on TCU  -Monitor for worsening s/symptoms of concerns  -Continue Empaglifozin 25mg daily  -Discontinued glipizide due to hypoglycemia risks  -Continue metformin 500mg BID with  meals  -Monitor Blood Glucose BID. Update if Blood Glucose <70 and/or >450  -BMP, CBC, CRP and procalcitonin due 5/2/25--results pending  -CMP and CBC due 5/7/25     (J44.9) Chronic obstructive pulmonary disease, unspecified COPD type (H)  (J45.20) Mild intermittent asthma without complication  (R06.89) Diminished lung sounds  (R09.89) Chest congestion  Comment: Chronic. Lungs diminished today. Edema present to bilateral lower extremities now.   Plan:   -Monitor respiratory status  -Albuterol inhaler PRN  -Encourage IS every 4 hours while awake  -Monitor for worsening s/symptoms of concerns  -BMP, CBC, CRP and procalcitonin due 5/2/25--results pending  -CMP and CBC due 5/7/25     Narrative & Impression   EXAM: XR CHEST PORT 1 VIEW  LOCATION: [facility]  DATE: 4/30/2025     INDICATION:  Chest congestion.  COMPARISON: None.                                                                      IMPRESSION: Minimal left basilar opacities with differentials of atelectasis versus infectious / inflammatory process. Remaining lungs are clear. No pleural effusion of significant size. Normal heart size.         (K59.01) Constipation  Comment: Acute on chronic. Suspect S/T polypharmacy. Abdominal xray on site revealed moderate stool burden. Stable   Plan:  -senna S 1 tab BID and BID PRN  -Continue miralax daily scheduled  -Bisacodyl 10mg rectal supp daily PRN  -Continue adequate hydration and ambulation practices.   -Monitor BM patterns     (N32.81) OAB  Comment: Chronic. Stable  Plan:  -Monitor urinary status  -Continue mirabegron 50mg daily  -Continue solenacin succinate 5mg daily  -Follow up with urology post TCU  -BMP, CBC, CRP and procalcitonin due 5/2/25--results pending  -CMP and CBC due 5/7/25     (H04.123) Dry eyes  (H11.32) Conjunctival hemorrhage of left eye  Comment: Acute. She noted this few days ago. Suspect S/T constipation strain---RESOLVED  Plan:  -Monitor for worsening s/symptoms of  concerns  -Refresh tears to QID PRN  -Ophthalmology referral if worsens.      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   Plan:  -Continue miconazole powder BID  -Monitor for worsening s/symptoms of concerns     Electronically signed:   Dr. Angeli Garcia DNP, APRN, FNP-C, WCS-C, EDS-C     Provider reviewed records from facility, and interpreted most recent imaging/lab work, and vital signs.   Acute and chronic conditions managed by writer. Have been reviewed during today's exam.

## 2025-05-02 ENCOUNTER — TRANSITIONAL CARE UNIT VISIT (OUTPATIENT)
Dept: GERIATRICS | Facility: CLINIC | Age: 85
End: 2025-05-02
Payer: MEDICARE

## 2025-05-02 VITALS
OXYGEN SATURATION: 96 % | SYSTOLIC BLOOD PRESSURE: 126 MMHG | WEIGHT: 201.4 LBS | DIASTOLIC BLOOD PRESSURE: 71 MMHG | HEIGHT: 66 IN | BODY MASS INDEX: 32.37 KG/M2 | RESPIRATION RATE: 18 BRPM | HEART RATE: 82 BPM | TEMPERATURE: 97.9 F

## 2025-05-02 DIAGNOSIS — N18.31 STAGE 3A CHRONIC KIDNEY DISEASE (H): ICD-10-CM

## 2025-05-02 DIAGNOSIS — R21 GROIN RASH: ICD-10-CM

## 2025-05-02 DIAGNOSIS — E66.812 CLASS 2 SEVERE OBESITY WITH BODY MASS INDEX (BMI) OF 35 TO 39.9 WITH SERIOUS COMORBIDITY (H): ICD-10-CM

## 2025-05-02 DIAGNOSIS — F41.1 GAD (GENERALIZED ANXIETY DISORDER): ICD-10-CM

## 2025-05-02 DIAGNOSIS — F32.A DEPRESSION, UNSPECIFIED DEPRESSION TYPE: ICD-10-CM

## 2025-05-02 DIAGNOSIS — R06.89 DECREASED LUNG SOUNDS: ICD-10-CM

## 2025-05-02 DIAGNOSIS — J45.20 MILD INTERMITTENT ASTHMA WITHOUT COMPLICATION: ICD-10-CM

## 2025-05-02 DIAGNOSIS — J44.9 CHRONIC OBSTRUCTIVE PULMONARY DISEASE, UNSPECIFIED COPD TYPE (H): ICD-10-CM

## 2025-05-02 DIAGNOSIS — D64.9 CHRONIC ANEMIA: ICD-10-CM

## 2025-05-02 DIAGNOSIS — G83.4 CAUDA EQUINA COMPRESSION (H): ICD-10-CM

## 2025-05-02 DIAGNOSIS — M48.061 SPINAL STENOSIS OF LUMBAR REGION, UNSPECIFIED WHETHER NEUROGENIC CLAUDICATION PRESENT: ICD-10-CM

## 2025-05-02 DIAGNOSIS — R53.81 PHYSICAL DECONDITIONING: ICD-10-CM

## 2025-05-02 DIAGNOSIS — N32.81 OAB (OVERACTIVE BLADDER): ICD-10-CM

## 2025-05-02 DIAGNOSIS — M48.00 SPINAL STENOSIS, UNSPECIFIED SPINAL REGION: ICD-10-CM

## 2025-05-02 DIAGNOSIS — I89.0 LYMPHEDEMA: ICD-10-CM

## 2025-05-02 DIAGNOSIS — Z91.81 PERSONAL HISTORY OF FALL: ICD-10-CM

## 2025-05-02 DIAGNOSIS — F31.9 BIPOLAR 1 DISORDER (H): ICD-10-CM

## 2025-05-02 DIAGNOSIS — H04.123 DRY EYES: Primary | ICD-10-CM

## 2025-05-02 DIAGNOSIS — E66.01 CLASS 2 SEVERE OBESITY WITH BODY MASS INDEX (BMI) OF 35 TO 39.9 WITH SERIOUS COMORBIDITY (H): ICD-10-CM

## 2025-05-02 DIAGNOSIS — F41.9 ANXIETY: ICD-10-CM

## 2025-05-02 DIAGNOSIS — L30.9 DERMATITIS: ICD-10-CM

## 2025-05-02 DIAGNOSIS — H81.09 MENIERE'S DISEASE, UNSPECIFIED LATERALITY: ICD-10-CM

## 2025-05-02 DIAGNOSIS — I10 ESSENTIAL HYPERTENSION: ICD-10-CM

## 2025-05-02 DIAGNOSIS — E11.21 TYPE 2 DIABETES MELLITUS WITH DIABETIC NEPHROPATHY, WITHOUT LONG-TERM CURRENT USE OF INSULIN (H): ICD-10-CM

## 2025-05-02 DIAGNOSIS — B36.9 FUNGAL DERMATITIS: ICD-10-CM

## 2025-05-02 DIAGNOSIS — K59.01 SLOW TRANSIT CONSTIPATION: ICD-10-CM

## 2025-05-02 DIAGNOSIS — E78.5 DYSLIPIDEMIA: ICD-10-CM

## 2025-05-02 DIAGNOSIS — M62.81 GENERALIZED MUSCLE WEAKNESS: ICD-10-CM

## 2025-05-02 DIAGNOSIS — G89.4 CHRONIC PAIN SYNDROME: ICD-10-CM

## 2025-05-02 DIAGNOSIS — H11.32 CONJUNCTIVAL HEMORRHAGE OF LEFT EYE: ICD-10-CM

## 2025-05-02 DIAGNOSIS — R09.89 CHEST CONGESTION: ICD-10-CM

## 2025-05-02 LAB
ANION GAP SERPL CALCULATED.3IONS-SCNC: 12 MMOL/L (ref 7–15)
BASOPHILS # BLD AUTO: 0 10E3/UL (ref 0–0.2)
BASOPHILS NFR BLD AUTO: 0 %
BUN SERPL-MCNC: 17.2 MG/DL (ref 8–23)
CALCIUM SERPL-MCNC: 9.6 MG/DL (ref 8.8–10.4)
CHLORIDE SERPL-SCNC: 100 MMOL/L (ref 98–107)
CREAT SERPL-MCNC: 0.98 MG/DL (ref 0.51–0.95)
CRP SERPL-MCNC: <3 MG/L
EGFRCR SERPLBLD CKD-EPI 2021: 57 ML/MIN/1.73M2
EOSINOPHIL # BLD AUTO: 0.2 10E3/UL (ref 0–0.7)
EOSINOPHIL NFR BLD AUTO: 3 %
ERYTHROCYTE [DISTWIDTH] IN BLOOD BY AUTOMATED COUNT: 13.8 % (ref 10–15)
GLUCOSE SERPL-MCNC: 210 MG/DL (ref 70–99)
HCO3 SERPL-SCNC: 28 MMOL/L (ref 22–29)
HCT VFR BLD AUTO: 42.9 % (ref 35–47)
HGB BLD-MCNC: 13.4 G/DL (ref 11.7–15.7)
IMM GRANULOCYTES # BLD: 0 10E3/UL
IMM GRANULOCYTES NFR BLD: 0 %
LYMPHOCYTES # BLD AUTO: 1.9 10E3/UL (ref 0.8–5.3)
LYMPHOCYTES NFR BLD AUTO: 32 %
MCH RBC QN AUTO: 29.1 PG (ref 26.5–33)
MCHC RBC AUTO-ENTMCNC: 31.2 G/DL (ref 31.5–36.5)
MCV RBC AUTO: 93 FL (ref 78–100)
MONOCYTES # BLD AUTO: 0.5 10E3/UL (ref 0–1.3)
MONOCYTES NFR BLD AUTO: 9 %
NEUTROPHILS # BLD AUTO: 3.3 10E3/UL (ref 1.6–8.3)
NEUTROPHILS NFR BLD AUTO: 55 %
NRBC # BLD AUTO: 0 10E3/UL
NRBC BLD AUTO-RTO: 0 /100
PLATELET # BLD AUTO: 150 10E3/UL (ref 150–450)
POTASSIUM SERPL-SCNC: 3.8 MMOL/L (ref 3.4–5.3)
PROCALCITONIN SERPL IA-MCNC: 0.13 NG/ML
RBC # BLD AUTO: 4.61 10E6/UL (ref 3.8–5.2)
SODIUM SERPL-SCNC: 140 MMOL/L (ref 135–145)
WBC # BLD AUTO: 5.9 10E3/UL (ref 4–11)

## 2025-05-02 PROCEDURE — 84145 PROCALCITONIN (PCT): CPT | Mod: ORL | Performed by: NURSE PRACTITIONER

## 2025-05-02 PROCEDURE — 99309 SBSQ NF CARE MODERATE MDM 30: CPT | Performed by: NURSE PRACTITIONER

## 2025-05-02 PROCEDURE — 86140 C-REACTIVE PROTEIN: CPT | Mod: ORL | Performed by: NURSE PRACTITIONER

## 2025-05-02 PROCEDURE — 36415 COLL VENOUS BLD VENIPUNCTURE: CPT | Mod: ORL | Performed by: NURSE PRACTITIONER

## 2025-05-02 PROCEDURE — 80048 BASIC METABOLIC PNL TOTAL CA: CPT | Mod: ORL | Performed by: NURSE PRACTITIONER

## 2025-05-02 PROCEDURE — 85025 COMPLETE CBC W/AUTO DIFF WBC: CPT | Mod: ORL | Performed by: NURSE PRACTITIONER

## 2025-05-02 NOTE — LETTER
5/2/2025      Jumana Yang  [address]  [place] [address]        [facility] GERIATRICS    Chief Complaint   Patient presents with     RECHECK     HPI:  Jumana Yang is a 84 year old  (1940), who is being seen today for an episodic care visit at: [facility] ([facility])([facility]) [[id number]]. Today's concern is: The primary encounter diagnosis was Dry eyes. Diagnoses of Essential hypertension, Chest congestion, Lymphedema, Type 2 diabetes mellitus with diabetic nephropathy, without long-term current use of insulin (H), Chronic pain syndrome, JANIE (generalized anxiety disorder), Physical deconditioning, Personal history of fall, Generalized muscle weakness, Spinal stenosis, unspecified spinal region, Cauda equina compression (H), Class 2 severe obesity with body mass index (BMI) of 35 to 39.9 with serious comorbidity (H), Anxiety, Dyslipidemia, Depression, unspecified depression type, Chronic obstructive pulmonary disease, unspecified COPD type (H), Meniere's disease, unspecified laterality, Bipolar 1 disorder (H), OAB (overactive bladder), Mild intermittent asthma without complication, Dermatitis, Spinal stenosis of lumbar region, unspecified whether neurogenic claudication present, Slow transit constipation, Stage 3a chronic kidney disease (H), Fungal dermatitis, Chronic anemia, Conjunctival hemorrhage of left eye, Groin rash, and Decreased lung sounds were also pertinent to this visit.    Met with patient who denies any chest pain, palpitations, shortness of breath, LOPEZ, lightheadedness, dizziness, or cough. Denies any abdominal discomfort. Denies N&V. Denies B&B concerns. Denies dysuria or frequency. Denies loose or constipation. Appetite good. Sleeping well. Edema present with lymphedema wraps in place. Anxious due to edema concerns and needing to stay on TCU for longer.     BP Readings from Last 3 Encounters:   05/02/25 126/71   04/30/25 136/62   04/30/25 123/65  "    Wt Readings from Last 5 Encounters:   05/02/25 91.4 kg (201 lb 6.4 oz)   04/30/25 91.4 kg (201 lb 6.4 oz)   04/30/25 91.4 kg (201 lb 6.4 oz)   04/28/25 91.4 kg (201 lb 6.4 oz)   04/24/25 92.7 kg (204 lb 6.4 oz)     Allergies, and PMH/PSH reviewed in EPIC today.  REVIEW OF SYSTEMS:  4 point ROS including Respiratory, CV, GI and , other than that noted in the HPI,  is negative    Objective:   /71   Pulse 82   Temp 97.9  F (36.6  C)   Resp 18   Ht 1.676 m (5' 6\")   Wt 91.4 kg (201 lb 6.4 oz)   LMP  (LMP Unknown)   SpO2 96%   BMI 32.51 kg/m    GENERAL APPEARANCE:  Alert, anxious, cooperative  ENT:  Mouth and posterior oropharynx normal, moist mucous membranes, [place]  EYES:  EOM, conjunctivae, lids, pupils and irises normal  NECK:  No adenopathy,masses or thyromegaly  RESP:  respiratory effort and palpation of chest normal, no respiratory distress, rhonchi bibasilar  CV:  regular rate and rhythm, no murmur, rub, or gallop, peripheral edema 3+ in BLE  ABDOMEN:  normal bowel sounds, soft, nontender, no hepatosplenomegaly or other masses, no guarding or rebound  M/S:   Ambulates short distance with walker. Wheelchair for long distance needs  SKIN:  Inspection of skin and subcutaneous tissue baseline, Palpation of skin and subcutaneous tissue baseline  NEURO:   Cranial nerves 2-12 are normal tested and grossly at patient's baseline, no purposeful movement in upper and lower extremities  PSYCH:  oriented X 3, anxious    Most Recent 3 CBC's:  Recent Labs   Lab Test 05/02/25  1023 04/23/25  1210 04/14/25  1036   WBC 5.9 6.3 7.4   HGB 13.4 13.9 13.6   MCV 93 96 99    192 202     Most Recent 3 BMP's:  Recent Labs   Lab Test 05/02/25  1023 04/23/25  1210 04/14/25  1036    142 141   POTASSIUM 3.8 4.1 3.5   CHLORIDE 100 100 101   CO2 28 29 28   BUN 17.2 19.9 18.0   CR 0.98* 1.06* 0.98*   ANIONGAP 12 13 12   MACY 9.6 9.7 9.2   * 228* 125*     Most Recent 2 LFT's:  Recent Labs   Lab Test " 04/14/25  1036 04/03/25  0945   AST 35 28   ALT 35 21   ALKPHOS 91 88   BILITOTAL 0.3 0.4     Most Recent Hemoglobin A1c:  Recent Labs   Lab Test 04/03/25  0945   A1C 6.9*     Most Recent ESR & CRP:  Recent Labs   Lab Test 05/02/25  1023 01/29/24  0912 07/03/22  2204 03/07/22  1354   SED  --   --   --  5   CRP  --   --  0.6 3.5*   CRPI <3.00   < >  --   --     < > = values in this interval not displayed.     Most Recent Anemia Panel:  Recent Labs   Lab Test 05/02/25  1023 04/10/25  0820 04/03/25  0945 04/13/22  0559 04/10/22  1123   WBC 5.9   < > 8.6   < >  --    HGB 13.4   < > 12.9   < >  --    HCT 42.9   < > 42.4   < >  --    MCV 93   < > 95   < >  --       < > 253   < >  --    IRON  --   --   --   --  46   B12  --   --  987  --   --     < > = values in this interval not displayed.       Assessment/Plan:  (R53.81) Physical deconditioning  (primary encounter diagnosis)  (M62.81) Generalized muscle weakness  Comment: Acute on chronic. S/T below diagnosis. [facility] 20/30  Plan:   -Continue Physical therapy and Occupational therapy as directed  -SW to remain involved for safe discharge planning needs     (Z91.81) Personal history of fall  (G89.4) Chronic pain syndrome  (G83.4) Cauda equina compression (H)  (M48.00) Spinal stenosis, unspecified spinal region  Comment: Acute on chronic. Per hospital records-presenting for leg pain and weakness following a fall. She is clinically stable. She has a known history of lumbar spinal stenosis as well as previously demonstrated L5 nerve impingement. By her report, she has had a chronic worsening of her functional status over time. The lower extremity weakness today appeared to precede her fall. There appears to have been some progression of lumbar spinal canal stenosis compared to the MRI from 9/25/24.   Plan:   -Monitor pain complaints as directed  -Follow up with neurosurgery as directed   -Continue vitamin D 2000U daily  -Continue lyrica 100mg TID (max dose for  CrCl)  -Continue Tylenol 1000mg TID and daily PRN  -Continue oxycodone to 5mg daily PRN x 2 weeks then discontinue--due to stop 5/7/25  -BMP, CBC, CRP and procalcitonin due 5/2/25--results pending  -CMP and CBC due 5/7/25     (I10) Essential hypertension  (E66.812,  E66.01) Class 2 severe obesity with body mass index (BMI) of 35 to 39.9 with serious comorbidity (H)  (E78.5) Dyslipidemia  (I89.0) Lymphedema  (N18.31) Stage 3a chronic kidney disease (H)  (I89.0) Lymphedema  Comment: Chronic. Based on JNC-8 goals,  patients age of 84 year old, presence of diabetes or CKD, and goals of care goal BP is  <140/90 mm Hg. Noted patients BP is higher than goal and will adjust plan of care by..  Plan:   -Monitor BP and HR  -Start Daily weights  -Continue rosuvastatin 10mg daily  -Continue OT lymphedema therapy eval and tx to assist with BLE edema  -Continue amlodipine 10mg daily. HOLD if SBP<100  -Continue triamterene-hydrochlorothiazide to 2 capsules of 37.5-25mg=75-50mg daily.. HOLD if SBP<100  -BMP, CBC, CRP and procalcitonin due 5/2/25--results pending  -CMP and CBC due 5/7/25     (F41.9) Anxiety  (F32.A) Depression, unspecified depression type  (F31.9) Bipolar 1 disorder (H)  Comment: Chronic. Stable. I do not see any records from psych. [facility] 20/30  Plan:   -Monitor mood and behaviors  -Monitor for worsening s/symptoms of concerns  -Monitor for changes in mobility, eating and sleeping patterns  -Continue buspirone 7.5mg BID  -Continue duloxetine 60mg daily  -Continue hydroxyzine 25mg BID and BID PRN (PRN doses x 14 day duration)  -Continue lamotrigine 100mg BID  -BMP, CBC, CRP and procalcitonin due 5/2/25--results pending  -CMP and CBC due 5/7/25     (D64.9) Chronic anemia  Comment: Chronic. Baseline hgb~ 13  Plan:   -Monitor for bleeding risks  -Monitor for worsening s/symptoms of concerns  -BMP, CBC, CRP and procalcitonin due 5/2/25--results pending  -CMP and CBC due 5/7/25     (H81.09) Meniere disease  Comment:  Chronic. Followed with ENT--last visit back in 2022  Plan:  -Monitor for worsening s/symptoms of concerns  -ENT post TCU  -Continue prednisone 6mg daily (appears to be on this chronically since 2017 or so)  -BMP, CBC, CRP and procalcitonin due 5/2/25--results pending  -CMP and CBC due 5/7/25     (L30.9) Dermatitis  Comment: Acute on chronic. Admitted with wound care to area.   Plan:  -Encourage repositioning  -Monitor for worsening s/symptoms of concerns  -Continue calmoseptine to area BID.     (E11.21) Type 2 diabetes mellitus with diabetic nephropathy, without long-term current use of insulin (H)  Comment: Chronic. Last A1c 4/3/25 was 6.8%. Goal <9%.   Plan:   -HOLD Weekly ozempic while on TCU  -Monitor for worsening s/symptoms of concerns  -Continue Empaglifozin 25mg daily  -Discontinued glipizide due to hypoglycemia risks  -Continue metformin 500mg BID with meals  -Monitor Blood Glucose BID. Update if Blood Glucose <70 and/or >450  -BMP, CBC, CRP and procalcitonin due 5/2/25--results pending  -CMP and CBC due 5/7/25     (J44.9) Chronic obstructive pulmonary disease, unspecified COPD type (H)  (J45.20) Mild intermittent asthma without complication  (R06.89) Diminished lung sounds  (R09.89) Chest congestion  Comment: Chronic. Lungs diminished today. Edema present to bilateral lower extremities now.   Plan:   -Monitor respiratory status  -Albuterol inhaler PRN but also add BID x 1 week  -Encourage IS every 4 hours while awake  -Monitor for worsening s/symptoms of concerns  -BMP, CBC, CRP and procalcitonin due 5/2/25--results pending  -CMP and CBC due 5/7/25     Narrative & Impression   EXAM: XR CHEST PORT 1 VIEW  LOCATION: [facility]  DATE: 4/30/2025     INDICATION:  Chest congestion.  COMPARISON: None.                                                                      IMPRESSION: Minimal left basilar opacities with differentials of atelectasis versus infectious / inflammatory process.  Remaining lungs are clear. No pleural effusion of significant size. Normal heart size.         (K59.01) Constipation  Comment: Acute on chronic. Suspect S/T polypharmacy. Abdominal xray on site revealed moderate stool burden. Stable   Plan:  -senna S 1 tab BID and BID PRN  -Continue miralax daily scheduled  -Bisacodyl 10mg rectal supp daily PRN  -Continue adequate hydration and ambulation practices.   -Monitor BM patterns     (N32.81) OAB  Comment: Chronic. Stable  Plan:  -Monitor urinary status  -Continue mirabegron 50mg daily  -Continue solenacin succinate 5mg daily  -Follow up with urology post TCU  -BMP, CBC, CRP and procalcitonin due 5/2/25--results pending  -CMP and CBC due 5/7/25     (H04.123) Dry eyes  (H11.32) Conjunctival hemorrhage of left eye  Comment: Acute. She noted this few days ago. Suspect S/T constipation strain---RESOLVED  Plan:  -Monitor for worsening s/symptoms of concerns  -Refresh tears to QID PRN  -Ophthalmology referral if worsens.      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   Plan:  -Continue miconazole powder BID  -Monitor for worsening s/symptoms of concerns     Electronically signed:   Dr. Angeli Garcia DNP, APRN, FNP-C, WCS-C, EDS-C     Provider reviewed records from facility, and interpreted most recent imaging/lab work, and vital signs.   Acute and chronic conditions managed by writer. Have been reviewed during today's exam.       Sincerely,        Angeli Garcia, JUAN ANTONIO CNP    Electronically signed

## 2025-05-06 ENCOUNTER — LAB REQUISITION (OUTPATIENT)
Dept: LAB | Facility: CLINIC | Age: 85
End: 2025-05-06
Payer: MEDICARE

## 2025-05-06 DIAGNOSIS — D64.9 ANEMIA, UNSPECIFIED: ICD-10-CM

## 2025-05-06 DIAGNOSIS — R10.9 ABDOMINAL CRAMPING: Primary | ICD-10-CM

## 2025-05-06 DIAGNOSIS — I10 ESSENTIAL (PRIMARY) HYPERTENSION: ICD-10-CM

## 2025-05-06 DIAGNOSIS — N18.30 CHRONIC KIDNEY DISEASE, STAGE 3 UNSPECIFIED (H): ICD-10-CM

## 2025-05-06 DIAGNOSIS — R11.2 NAUSEA AND VOMITING, UNSPECIFIED VOMITING TYPE: ICD-10-CM

## 2025-05-06 RX ORDER — SIMETHICONE 80 MG
80 TABLET,CHEWABLE ORAL EVERY 6 HOURS PRN
Qty: 20 TABLET | Refills: 0 | Status: SHIPPED | OUTPATIENT
Start: 2025-05-06 | End: 2025-05-07

## 2025-05-06 RX ORDER — ONDANSETRON 4 MG/1
4 TABLET, FILM COATED ORAL EVERY 6 HOURS PRN
Qty: 10 TABLET | Refills: 0 | Status: SHIPPED | OUTPATIENT
Start: 2025-05-06 | End: 2025-05-07

## 2025-05-06 NOTE — PROGRESS NOTES
[facility] GERIATRICS DISCHARGE SUMMARY  PATIENT'S NAME: Jumana Yang  YOB: 1940  MEDICAL RECORD NUMBER:  [id number]  Place of Service where encounter took place:  [facility] (TCU)(SNF) [[id number]]    PRIMARY CARE PROVIDER AND CLINIC RESPONSIBLE AFTER TRANSFER:   JUAN ANTONIO John CNP, [facility] [address] [facility] / [place] [address]    [facility] [place]     Transferring providers: JUAN ANTONIO Barton CNP, Dr. Caitlin Silverman MD  Recent Hospitalization/ED:  [facility] Hospital stay 3/24/25 to 4/1/25.  Date of SNF Admission: April 01, 2025  Date of SNF (anticipated) Discharge: May 10, 2025  Discharged to: previous independent home  Cognitive Scores: SLUMS 20/30  Physical Function: Ambulating <100 ft with walker CGA. Will discharge at  level for independence.   DME: SNF  coordinating DME needs     CODE STATUS/ADVANCE DIRECTIVES DISCUSSION:  Prior   ALLERGIES: Propofol, Shellfish allergy, Capsaicin, Capsaicin, and Tegaderm transparent dressing (informational only)    NURSING FACILITY COURSE   Medication Changes/Rationale:   See below    Summary of nursing facility stay:     (R53.81) Physical deconditioning  (primary encounter diagnosis)  (M62.81) Generalized muscle weakness  Comment: Acute on chronic. S/T below diagnosis. SLUMS 20/30  Plan:   -Continue Physical therapy and Occupational therapy as directed  -SW to remain involved for safe discharge planning needs  -Discharge home with services in place     (Z91.81) Personal history of fall  (G89.4) Chronic pain syndrome  (G83.4) Cauda equina compression (H)  (M48.00) Spinal stenosis, unspecified spinal region  Comment: Acute on chronic. Per hospital records-presenting for leg pain and weakness following a fall. She is clinically stable. She has a known history of lumbar spinal stenosis as well as previously demonstrated L5 nerve impingement. By her report, she has had a chronic  worsening of her functional status over time. The lower extremity weakness today appeared to precede her fall. There appears to have been some progression of lumbar spinal canal stenosis compared to the MRI from 9/25/24. Oxycodone stopped 5/7  Plan:   -Monitor pain complaints as directed  -Follow up with neurosurgery as directed   -Continue vitamin D 2000U daily  -Continue lyrica 100mg TID (max dose for CrCl)  -Continue Tylenol 1000mg TID and daily PRN  -Advised to slow GDR off oxycodone, should've discontinued as of 5/7, but will extend 5mg daily PRN order for additional 14 days. Educated and advised to off within 2 weeks  -CMP and CBC due 5/7/25--results pending  -Trend labs with PCP post TCU     (I10) Essential hypertension  (E66.812,  E66.01) Class 2 severe obesity with body mass index (BMI) of 35 to 39.9 with serious comorbidity (H)  (E78.5) Dyslipidemia  (I89.0) Lymphedema  (N18.31) Stage 3a chronic kidney disease (H)  (I89.0) Lymphedema  Comment: Chronic. Based on JNC-8 goals,  patients age of 84 year old, presence of diabetes or CKD, and goals of care goal BP is  <140/90 mm Hg. Patient is stable with current plan of care and routine assessment..  Plan:   -Monitor BP and HR  -Continue Daily weights  -Lymphedema therapy successfully transitioned from lymph wraps to TG compression stockings daily. On in AM and off at HS  -Continue rosuvastatin 10mg daily  -Continue amlodipine 10mg daily. HOLD if SBP<100  -Continue triamterene-hydrochlorothiazide to 2 capsules of 37.5-25mg=75-50mg daily.. HOLD if SBP<100  -CMP and CBC due 5/7/25--results pending  -Trend labs with PCP post TCU         (F41.9) Anxiety  (F32.A) Depression, unspecified depression type  (F31.9) Bipolar 1 disorder (H)  Comment: Chronic. Stable. I do not see any records from psych. SLUMS 20/30  Plan:   -Monitor mood and behaviors  -Monitor for worsening s/symptoms of concerns  -Monitor for changes in mobility, eating and sleeping patterns  -Continue  buspirone 7.5mg BID  -Continue duloxetine 60mg daily  -Continue hydroxyzine 25mg BID and BID PRN (PRN doses x 14 day duration)  -Continue lamotrigine 100mg BID  -CMP and CBC due 5/7/25--results pending  -Trend labs with PCP post TCU     (D64.9) Chronic anemia  Comment: Chronic. Baseline hgb~ 13  Plan:   -Monitor for bleeding risks  -Monitor for worsening s/symptoms of concerns  -CMP and CBC due 5/7/25--results pending  -Trend labs with PCP post TCU     (H81.09) Meniere disease  Comment: Chronic. Followed with ENT--last visit back in 2022  Plan:  -Monitor for worsening s/symptoms of concerns  -ENT post TCU  -Continue prednisone 6mg daily (appears to be on this chronically since 2017 or so)  -CMP and CBC due 5/7/25--results pending  -Trend labs with PCP post TCU     (L30.9) Dermatitis  Comment: Acute on chronic. Admitted with wound care to area.   Plan:  -Encourage repositioning  -Monitor for worsening s/symptoms of concerns  -Continue calmoseptine to area BID.     (E11.21) Type 2 diabetes mellitus with diabetic nephropathy, without long-term current use of insulin (H)  Comment: Chronic. Last A1c 4/3/25 was 6.8%. Goal <9%.   Plan:   -HOLD Weekly ozempic while on TCU--she has not found any success with this as she reports this was started to assist with weight loss.   -Monitor for worsening s/symptoms of concerns  -Continue Empaglifozin 25mg daily  -Discontinued glipizide due to hypoglycemia risks  -Continue metformin 500mg BID with meals  -Monitor Blood Glucose BID. Update if Blood Glucose <70 and/or >450  -CMP and CBC due 5/7/25--results pending  -Trend labs with PCP post TCU         (J44.9) Chronic obstructive pulmonary disease, unspecified COPD type (H)  (J45.20) Mild intermittent asthma without complication  (R06.89) Diminished lung sounds  (R09.89) Chest congestion  Comment: Chronic. Lungs diminished today. Edema present to bilateral lower extremities now.   Plan:   -Monitor respiratory status  -Albuterol inhaler  PRN   -Encourage IS every 4 hours while awake  -Monitor for worsening s/symptoms of concerns  -CMP and CBC due 5/7/25--results pending  -Trend labs with PCP post TCU     Narrative & Impression   EXAM: XR CHEST PORT 1 VIEW  LOCATION: [facility]  DATE: 4/30/2025     INDICATION:  Chest congestion.  COMPARISON: None.                                                                      IMPRESSION: Minimal left basilar opacities with differentials of atelectasis versus infectious / inflammatory process. Remaining lungs are clear. No pleural effusion of significant size. Normal heart size.         (K59.01) Constipation  Comment: Acute on chronic. Suspect S/T polypharmacy. Abdominal xray on site revealed moderate stool burden. Now having diarrhea complaints.   Plan:  -senna S 1 tab BID and BID PRN---hold if loose stools  -Continue miralax daily scheduled---hold if loose stools  -Bisacodyl 10mg rectal supp daily PRN  -Continue adequate hydration and ambulation practices.   -Monitor BM patterns  -Zofran PRN  -Simethicone PRN     (N32.81) OAB  Comment: Chronic. Stable  Plan:  -Monitor urinary status  -Continue mirabegron 50mg daily  -Continue solenacin succinate 5mg daily  -Follow up with urology post TCU  -CMP and CBC due 5/7/25--results pending  -Trend labs with PCP post TCU     (H04.123) Dry eyes  (H11.32) Conjunctival hemorrhage of left eye  Comment: Acute. She noted this few days ago. Suspect S/T constipation strain---RESOLVED  Plan:  -Monitor for worsening s/symptoms of concerns  -Refresh tears QID PRN  -Ophthalmology referral if worsens.      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   Plan:  -Continue miconazole powder BID  -Monitor for worsening s/symptoms of concerns    Discharge Medications:  MED REC REQUIRED  Post Medication Reconciliation Status:  Discharge medications reconciled and changed, see notes/orders     Current Outpatient Medications   Medication Sig Dispense Refill     acetaminophen (TYLENOL) 500 MG tablet Take 2 tablets (1,000 mg) by mouth 2 times daily. May also take 2 tablets (1,000 mg) daily as needed for mild pain. 240 tablet 0    albuterol (PROAIR HFA/PROVENTIL HFA/VENTOLIN HFA) 108 (90 Base) MCG/ACT inhaler Inhale 2 puffs into the lungs every 4 hours as needed for shortness of breath, wheezing or cough. 18 g 0    amLODIPine (NORVASC) 5 MG tablet Take 2 tablets (10 mg) by mouth daily. HOLD if SBP<100 30 tablet 0    bisacodyl (DULCOLAX) 10 MG suppository Place 1 suppository (10 mg) rectally daily as needed for constipation. 6 suppository 0    blood glucose (ACCU-CHEK FASTCLIX) lancing device 1 each 2 times daily. Blood Glucose monitoring BID 1 each 0    blood glucose (CONTOUR TEST) test strip 1 strip by In Vitro route 2 times daily. 60 strip 0    busPIRone (BUSPAR) 7.5 MG tablet Take 1 tablet (7.5 mg) by mouth 2 times daily. 60 tablet 0    Calamine external lotion Apply topically as needed for itching. Calamine External Lotion 8-8 % Apply to Buttock  topically every 1 hours as needed for Itching      carboxymethylcellulose (CARBOXYMETHYLCELLULOSE SODIUM) 0.5 % SOLN ophthalmic solution Place 1 drop into both eyes 4 times daily as needed for dry eyes. 30 mL 0    CONTOUR NEXT TEST test strip 1 strip by In Vitro route 2 times daily. 60 strip 0    DULoxetine (CYMBALTA) 60 MG capsule Take 1 capsule (60 mg) by mouth every morning. 30 capsule 0    empagliflozin (JARDIANCE) 25 MG TABS tablet Take 1 tablet (25 mg) by mouth daily. 30 tablet 0    hydrOXYzine HCl (ATARAX) 25 MG tablet Take 1 tablet (25 mg) by mouth 2 times daily. May also take 1 tablet (25 mg) 2 times daily as needed for anxiety. 60 tablet 0    lamoTRIgine (LAMICTAL) 100 MG tablet Take 1 tablet (100 mg) by mouth 2 times daily. 60 tablet 0    menthol-zinc oxide (CALMOSEPTINE) 0.44-20.6 % OINT ointment Apply to buttock  g 0    metFORMIN (GLUCOPHAGE XR) 500 MG 24 hr tablet Take 1 tablet (500 mg) by mouth 2 times  daily (with meals). 60 tablet 0    miconazole (MICATIN) 2 % external powder Apply topically 2 times daily. To skin folds/under breast 85 g 0    mirabegron (MYRBETRIQ) 50 MG 24 hr tablet Take 1 tablet (50 mg) by mouth daily. 30 tablet 0    omeprazole (PRILOSEC) 20 MG DR capsule Take 1 capsule (20 mg) by mouth daily. 30 capsule 0    ondansetron (ZOFRAN) 4 MG tablet Take 1 tablet (4 mg) by mouth every 6 hours as needed for nausea. 30 tablet 0    oxyCODONE (ROXICODONE) 5 MG tablet Take 1 tablet (5 mg) by mouth daily as needed for pain.      polyethylene glycol (MIRALAX) 17 GM/Dose powder Take 17 g (1 Capful) by mouth daily. 510 g 0    predniSONE (DELTASONE) 1 MG tablet Take 1 tablet (1 mg) by mouth every morning. (In addition to the 5 mg dose; 1 mg + 5 mg = 6 mg) 30 tablet 0    predniSONE (DELTASONE) 5 MG tablet Take 1 tablet (5 mg) by mouth every morning. (In addition to the 1 mg dose; 5 mg + 1 mg = 6 mg) 30 tablet 0    pregabalin (LYRICA) 100 MG capsule Take 1 capsule (100 mg) by mouth 3 times daily. 90 capsule 0    rosuvastatin (CRESTOR) 10 MG tablet Take 1 tablet (10 mg) by mouth daily. 30 tablet 0    senna-docusate (SENOKOT-S/PERICOLACE) 8.6-50 MG tablet Take 1 tablet by mouth 2 times daily. May also take 1 tablet 2 times daily as needed for constipation. 60 tablet 0    simethicone (MYLICON) 80 MG chewable tablet Take 1 tablet (80 mg) by mouth every 6 hours as needed for flatulence or cramping. 30 tablet 0    solifenacin (VESICARE) 5 MG tablet Take 1 tablet (5 mg) by mouth daily. 30 tablet 0    triamterene-HCTZ (DYAZIDE) 37.5-25 MG capsule Take 2 capsules by mouth daily. Hold for SBP<100 60 capsule 0    trospium (SANCTURA) 20 MG tablet Take 1 tablet (20 mg) by mouth 2 times daily (before meals). 60 tablet 0    vitamin D3 (CHOLECALCIFEROL) 50 mcg (2000 units) tablet Take 1 tablet (50 mcg) by mouth daily. 30 tablet 0    emollient (VANICREAM) external cream Apply to bilateral lower extremities daily 113 g 0     "Semaglutide, 2 MG/DOSE, (OZEMPIC, 2 MG/DOSE,) 8 MG/3ML pen Inject 2 mg subcutaneously every 7 days. On Thursday      triamcinolone (KENALOG) 0.1 % external ointment Apply topically 2 times daily as needed for irritation.        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  GENERAL APPEARANCE:  Alert, in no distress, oriented, anxious, cooperative  ENT:  Mouth and posterior oropharynx normal, moist mucous membranes, [place]  EYES:  EOM, conjunctivae, lids, pupils and irises normal  RESP:  respiratory effort and palpation of chest normal, lungs clear to auscultation , no respiratory distress  CV:  regular rate and rhythm, no murmur, rub, or gallop, peripheral edema 1-2+ in BLE  ABDOMEN:  normal bowel sounds, soft, nontender, no hepatosplenomegaly or other masses, no guarding or rebound  M/S:   Ambulates short " distance with walker and SBA. Wheelchair for long distance needs and assist with independence  SKIN:  Inspection of skin and subcutaneous tissue baseline, Palpation of skin and subcutaneous tissue baseline  NEURO:   Cranial nerves 2-12 are normal tested and grossly at patient's baseline, no purposeful movement in upper and lower extremities  PSYCH:  oriented X 3, affect and mood normal, anxious     SNF labs: Most Recent 3 CBC's:  Recent Labs   Lab Test 05/02/25  1023 04/23/25  1210 04/14/25  1036   WBC 5.9 6.3 7.4   HGB 13.4 13.9 13.6   MCV 93 96 99    192 202     Most Recent 3 BMP's:  Recent Labs   Lab Test 05/02/25  1023 04/23/25  1210 04/14/25  1036    142 141   POTASSIUM 3.8 4.1 3.5   CHLORIDE 100 100 101   CO2 28 29 28   BUN 17.2 19.9 18.0   CR 0.98* 1.06* 0.98*   ANIONGAP 12 13 12   MACY 9.6 9.7 9.2   * 228* 125*     Most Recent 2 LFT's:  Recent Labs   Lab Test 04/14/25  1036 04/03/25  0945   AST 35 28   ALT 35 21   ALKPHOS 91 88   BILITOTAL 0.3 0.4     Most Recent Hemoglobin A1c:  Recent Labs   Lab Test 04/03/25  0945   A1C 6.9*     Most Recent Anemia Panel:  Recent Labs   Lab Test 05/02/25  1023 04/10/25  0820 04/03/25  0945 04/13/22  0559 04/10/22  1123   WBC 5.9   < > 8.6   < >  --    HGB 13.4   < > 12.9   < >  --    HCT 42.9   < > 42.4   < >  --    MCV 93   < > 95   < >  --       < > 253   < >  --    IRON  --   --   --   --  46   B12  --   --  987  --   --     < > = values in this interval not displayed.       DISCHARGE PLAN:  Follow up labs: No labs orders/due  Medical Follow Up:      Follow up with primary care provider in 1 weeks  [facility] scheduled appointments:  Appointments in Next Year      May 16, 2025 1:00 PM  Hearing Aid Check Extended with Ashlie Bejarano  [facility] [place] ) [phone]       Discharge Services: Home Care:  Occupational Therapy, Physical Therapy,  Registered Nurse, and Home Health Aide  Discharge Instructions Verbalized to Patient at Discharge:   Weight bearing restrictions:  Weight bearing as tolerated.   Monitor blood glucose monitoring 2 times a day. Keep a record and bring it to your next primary provider visit.   Continue to follow your diet:  regular.   Weigh yourself daily in the morning and keep a record. Call your primary clinic: a) if you are more short of breath, or b) if your weight changes more than 3 pounds in one day or more than 5 pounds in one week.   Driving is not recommended   24-hour supervision is recommended for safety.   -Continue Daily weights  -Lymphedema therapy successfully transitioned from lymph wraps to TG compression stockings daily. On in AM and off at HS  -Monitor respiratory status  -Albuterol inhaler PRN   -Encourage IS every 4 hours while awake    TOTAL DISCHARGE TIME:   37 minutes  Electronically signed by:  Dr. Angeli Garcia DNP, APRN, FNP-C, WCS-C, EDS-C    Documentation of Face to Face and Certification for Home Health Services    I certify that patient: Jumana Yang is under my care and that I, or a nurse practitioner or physician's assistant working with me, had a face-to-face encounter that meets the physician face-to-face encounter requirements with this patient on: 5/7/2025.    This encounter with the patient was in whole, or in part, for the following medical condition, which is the primary reason for home health care: The primary encounter diagnosis was Abdominal cramping. Diagnoses of Nausea and vomiting, unspecified vomiting type, Dry eyes, Essential hypertension, Chest congestion, Lymphedema, Type 2 diabetes mellitus with diabetic nephropathy, without long-term current use of insulin (H), Chronic pain syndrome, JANIE (generalized anxiety disorder), Physical deconditioning, Personal history of fall, Generalized muscle weakness, Spinal stenosis, unspecified spinal region, Class 2 severe obesity with body mass  index (BMI) of 35 to 39.9 with serious comorbidity (H), Cauda equina compression (H), Anxiety, Dyslipidemia, Depression, unspecified depression type, Chronic obstructive pulmonary disease, unspecified COPD type (H), Meniere's disease, unspecified laterality, Bipolar 1 disorder (H), OAB (overactive bladder), Mild intermittent asthma without complication, Dermatitis, Spinal stenosis of lumbar region, unspecified whether neurogenic claudication present, Slow transit constipation, Stage 3a chronic kidney disease (H), Fungal dermatitis, Chronic anemia, Conjunctival hemorrhage of left eye, Groin rash, Mixed incontinence, and Urge incontinence were also pertinent to this visit..    I certify that, based on my findings, the following services are medically necessary home health services: Nursing, Occupational Therapy, and Physical Therapy.    My clinical findings support the need for the above services because: Nurse is needed: To assess medication management, education after changes in medications or other medical regimen.., Occupational Therapy Services are needed to assess and treat cognitive ability and address ADL safety due to impairment in deconditioning., and Physical Therapy Services are needed to assess and treat the following functional impairments: deconditioning.    Further, I certify that my clinical findings support that this patient is homebound (i.e. absences from home require considerable and taxing effort and are for medical reasons or Orthodoxy services or infrequently or of short duration when for other reasons) because: Requires assistance of another person or specialized equipment to access medical services because patient: Is unable to walk greater than 100 feet without rest. and Requires supervision of another for safe transfer...    Based on the above findings. I certify that this patient is confined to the home and needs intermittent skilled nursing care, physical therapy and/or speech therapy.   The patient is under my care, and I have initiated the establishment of the plan of care.  This patient will be followed by a physician who will periodically review the plan of care.  Physician/Provider to provide follow up care: Terra Mathews    Attending hospital physician (the Medicare certified [facility] provider): Dr.Sara Andra MD, signing F2F and only signing for initial order. Please send all follow up questions and concerns or needed follow up signatures to the PCP, who [facility] has on file as:  Terra Mathews.    Physician Signature: See electronic signature associated with these discharge orders.  Date: 5/6/2025

## 2025-05-07 ENCOUNTER — DISCHARGE SUMMARY NURSING HOME (OUTPATIENT)
Dept: GERIATRICS | Facility: CLINIC | Age: 85
End: 2025-05-07
Payer: MEDICARE

## 2025-05-07 VITALS
SYSTOLIC BLOOD PRESSURE: 126 MMHG | OXYGEN SATURATION: 95 % | RESPIRATION RATE: 17 BRPM | DIASTOLIC BLOOD PRESSURE: 61 MMHG | HEART RATE: 88 BPM | BODY MASS INDEX: 31.69 KG/M2 | TEMPERATURE: 97.5 F | WEIGHT: 197.2 LBS | HEIGHT: 66 IN

## 2025-05-07 DIAGNOSIS — R53.81 PHYSICAL DECONDITIONING: ICD-10-CM

## 2025-05-07 DIAGNOSIS — R11.2 NAUSEA AND VOMITING, UNSPECIFIED VOMITING TYPE: ICD-10-CM

## 2025-05-07 DIAGNOSIS — G83.4 CAUDA EQUINA COMPRESSION (H): ICD-10-CM

## 2025-05-07 DIAGNOSIS — N18.31 STAGE 3A CHRONIC KIDNEY DISEASE (H): ICD-10-CM

## 2025-05-07 DIAGNOSIS — N39.41 URGE INCONTINENCE: ICD-10-CM

## 2025-05-07 DIAGNOSIS — F32.A DEPRESSION, UNSPECIFIED DEPRESSION TYPE: ICD-10-CM

## 2025-05-07 DIAGNOSIS — F41.1 GAD (GENERALIZED ANXIETY DISORDER): ICD-10-CM

## 2025-05-07 DIAGNOSIS — D64.9 CHRONIC ANEMIA: ICD-10-CM

## 2025-05-07 DIAGNOSIS — M62.81 GENERALIZED MUSCLE WEAKNESS: ICD-10-CM

## 2025-05-07 DIAGNOSIS — B36.9 FUNGAL DERMATITIS: ICD-10-CM

## 2025-05-07 DIAGNOSIS — R21 GROIN RASH: ICD-10-CM

## 2025-05-07 DIAGNOSIS — L30.9 DERMATITIS: ICD-10-CM

## 2025-05-07 DIAGNOSIS — E78.5 DYSLIPIDEMIA: ICD-10-CM

## 2025-05-07 DIAGNOSIS — H11.32 CONJUNCTIVAL HEMORRHAGE OF LEFT EYE: ICD-10-CM

## 2025-05-07 DIAGNOSIS — M48.00 SPINAL STENOSIS, UNSPECIFIED SPINAL REGION: ICD-10-CM

## 2025-05-07 DIAGNOSIS — R09.89 CHEST CONGESTION: ICD-10-CM

## 2025-05-07 DIAGNOSIS — K59.01 SLOW TRANSIT CONSTIPATION: ICD-10-CM

## 2025-05-07 DIAGNOSIS — Z91.81 PERSONAL HISTORY OF FALL: ICD-10-CM

## 2025-05-07 DIAGNOSIS — N39.46 MIXED INCONTINENCE: ICD-10-CM

## 2025-05-07 DIAGNOSIS — J45.20 MILD INTERMITTENT ASTHMA WITHOUT COMPLICATION: ICD-10-CM

## 2025-05-07 DIAGNOSIS — F31.9 BIPOLAR 1 DISORDER (H): ICD-10-CM

## 2025-05-07 DIAGNOSIS — I89.0 LYMPHEDEMA: ICD-10-CM

## 2025-05-07 DIAGNOSIS — N32.81 OAB (OVERACTIVE BLADDER): ICD-10-CM

## 2025-05-07 DIAGNOSIS — H04.123 DRY EYES: ICD-10-CM

## 2025-05-07 DIAGNOSIS — J44.9 CHRONIC OBSTRUCTIVE PULMONARY DISEASE, UNSPECIFIED COPD TYPE (H): ICD-10-CM

## 2025-05-07 DIAGNOSIS — F41.9 ANXIETY: ICD-10-CM

## 2025-05-07 DIAGNOSIS — I10 ESSENTIAL HYPERTENSION: ICD-10-CM

## 2025-05-07 DIAGNOSIS — E66.01 CLASS 2 SEVERE OBESITY WITH BODY MASS INDEX (BMI) OF 35 TO 39.9 WITH SERIOUS COMORBIDITY (H): ICD-10-CM

## 2025-05-07 DIAGNOSIS — E66.812 CLASS 2 SEVERE OBESITY WITH BODY MASS INDEX (BMI) OF 35 TO 39.9 WITH SERIOUS COMORBIDITY (H): ICD-10-CM

## 2025-05-07 DIAGNOSIS — R10.9 ABDOMINAL CRAMPING: Primary | ICD-10-CM

## 2025-05-07 DIAGNOSIS — E11.21 TYPE 2 DIABETES MELLITUS WITH DIABETIC NEPHROPATHY, WITHOUT LONG-TERM CURRENT USE OF INSULIN (H): ICD-10-CM

## 2025-05-07 DIAGNOSIS — H81.09 MENIERE'S DISEASE, UNSPECIFIED LATERALITY: ICD-10-CM

## 2025-05-07 DIAGNOSIS — G89.4 CHRONIC PAIN SYNDROME: ICD-10-CM

## 2025-05-07 DIAGNOSIS — M48.061 SPINAL STENOSIS OF LUMBAR REGION, UNSPECIFIED WHETHER NEUROGENIC CLAUDICATION PRESENT: ICD-10-CM

## 2025-05-07 LAB
ALBUMIN SERPL BCG-MCNC: 4.3 G/DL (ref 3.5–5.2)
ALP SERPL-CCNC: 99 U/L (ref 40–150)
ALT SERPL W P-5'-P-CCNC: 26 U/L (ref 0–50)
ANION GAP SERPL CALCULATED.3IONS-SCNC: 15 MMOL/L (ref 7–15)
AST SERPL W P-5'-P-CCNC: 26 U/L (ref 0–45)
BASOPHILS # BLD AUTO: 0 10E3/UL (ref 0–0.2)
BASOPHILS NFR BLD AUTO: 0 %
BILIRUB SERPL-MCNC: 0.4 MG/DL
BUN SERPL-MCNC: 24.5 MG/DL (ref 8–23)
CALCIUM SERPL-MCNC: 10 MG/DL (ref 8.8–10.4)
CHLORIDE SERPL-SCNC: 99 MMOL/L (ref 98–107)
CREAT SERPL-MCNC: 1.13 MG/DL (ref 0.51–0.95)
EGFRCR SERPLBLD CKD-EPI 2021: 48 ML/MIN/1.73M2
EOSINOPHIL # BLD AUTO: 0.2 10E3/UL (ref 0–0.7)
EOSINOPHIL NFR BLD AUTO: 2 %
ERYTHROCYTE [DISTWIDTH] IN BLOOD BY AUTOMATED COUNT: 13.5 % (ref 10–15)
GLUCOSE SERPL-MCNC: 302 MG/DL (ref 70–99)
HCO3 SERPL-SCNC: 27 MMOL/L (ref 22–29)
HCT VFR BLD AUTO: 45.9 % (ref 35–47)
HGB BLD-MCNC: 14.3 G/DL (ref 11.7–15.7)
IMM GRANULOCYTES # BLD: 0 10E3/UL
IMM GRANULOCYTES NFR BLD: 1 %
LYMPHOCYTES # BLD AUTO: 1.6 10E3/UL (ref 0.8–5.3)
LYMPHOCYTES NFR BLD AUTO: 24 %
MCH RBC QN AUTO: 29.5 PG (ref 26.5–33)
MCHC RBC AUTO-ENTMCNC: 31.2 G/DL (ref 31.5–36.5)
MCV RBC AUTO: 95 FL (ref 78–100)
MONOCYTES # BLD AUTO: 0.6 10E3/UL (ref 0–1.3)
MONOCYTES NFR BLD AUTO: 9 %
NEUTROPHILS # BLD AUTO: 4.4 10E3/UL (ref 1.6–8.3)
NEUTROPHILS NFR BLD AUTO: 64 %
NRBC # BLD AUTO: 0 10E3/UL
NRBC BLD AUTO-RTO: 0 /100
PLATELET # BLD AUTO: 156 10E3/UL (ref 150–450)
POTASSIUM SERPL-SCNC: 3.7 MMOL/L (ref 3.4–5.3)
PROT SERPL-MCNC: 6.8 G/DL (ref 6.4–8.3)
RBC # BLD AUTO: 4.85 10E6/UL (ref 3.8–5.2)
SODIUM SERPL-SCNC: 141 MMOL/L (ref 135–145)
WBC # BLD AUTO: 6.8 10E3/UL (ref 4–11)

## 2025-05-07 PROCEDURE — 99316 NF DSCHRG MGMT 30 MIN+: CPT | Performed by: NURSE PRACTITIONER

## 2025-05-07 PROCEDURE — 82310 ASSAY OF CALCIUM: CPT | Performed by: NURSE PRACTITIONER

## 2025-05-07 PROCEDURE — 80053 COMPREHEN METABOLIC PANEL: CPT | Mod: ORL | Performed by: NURSE PRACTITIONER

## 2025-05-07 PROCEDURE — 36415 COLL VENOUS BLD VENIPUNCTURE: CPT | Mod: ORL | Performed by: NURSE PRACTITIONER

## 2025-05-07 PROCEDURE — 85025 COMPLETE CBC W/AUTO DIFF WBC: CPT | Mod: ORL | Performed by: NURSE PRACTITIONER

## 2025-05-07 RX ORDER — METFORMIN HYDROCHLORIDE 500 MG/1
500 TABLET, EXTENDED RELEASE ORAL 2 TIMES DAILY WITH MEALS
Qty: 60 TABLET | Refills: 0 | Status: SHIPPED | OUTPATIENT
Start: 2025-05-07

## 2025-05-07 RX ORDER — BISACODYL 10 MG
10 SUPPOSITORY, RECTAL RECTAL DAILY PRN
Qty: 6 SUPPOSITORY | Refills: 0 | Status: SHIPPED | OUTPATIENT
Start: 2025-05-07

## 2025-05-07 RX ORDER — ALBUTEROL SULFATE 90 UG/1
2 INHALANT RESPIRATORY (INHALATION) EVERY 4 HOURS PRN
Qty: 18 G | Refills: 0 | Status: SHIPPED | OUTPATIENT
Start: 2025-05-07

## 2025-05-07 RX ORDER — AMLODIPINE BESYLATE 5 MG/1
10 TABLET ORAL DAILY
Qty: 30 TABLET | Refills: 0 | Status: SHIPPED | OUTPATIENT
Start: 2025-05-07

## 2025-05-07 RX ORDER — SIMETHICONE 80 MG
80 TABLET,CHEWABLE ORAL EVERY 6 HOURS PRN
Qty: 30 TABLET | Refills: 0 | Status: SHIPPED | OUTPATIENT
Start: 2025-05-07

## 2025-05-07 RX ORDER — MIRABEGRON 50 MG/1
50 TABLET, FILM COATED, EXTENDED RELEASE ORAL DAILY
Qty: 30 TABLET | Refills: 0 | Status: SHIPPED | OUTPATIENT
Start: 2025-05-07 | End: 2025-05-07

## 2025-05-07 RX ORDER — HYDROXYZINE HYDROCHLORIDE 25 MG/1
TABLET, FILM COATED ORAL
Qty: 60 TABLET | Refills: 0 | Status: SHIPPED | OUTPATIENT
Start: 2025-05-07

## 2025-05-07 RX ORDER — TRIAMTERENE AND HYDROCHLOROTHIAZIDE 37.5; 25 MG/1; MG/1
2 CAPSULE ORAL DAILY
Qty: 60 CAPSULE | Refills: 0 | Status: SHIPPED | OUTPATIENT
Start: 2025-05-07

## 2025-05-07 RX ORDER — ROSUVASTATIN CALCIUM 10 MG/1
10 TABLET, COATED ORAL DAILY
Qty: 30 TABLET | Refills: 0 | Status: SHIPPED | OUTPATIENT
Start: 2025-05-07

## 2025-05-07 RX ORDER — CARBOXYMETHYLCELLULOSE SODIUM 5 MG/ML
1 SOLUTION/ DROPS OPHTHALMIC 4 TIMES DAILY PRN
Qty: 30 ML | Refills: 0 | Status: SHIPPED | OUTPATIENT
Start: 2025-05-07

## 2025-05-07 RX ORDER — ACETAMINOPHEN 500 MG
TABLET ORAL
Qty: 240 TABLET | Refills: 0 | Status: SHIPPED | OUTPATIENT
Start: 2025-05-07

## 2025-05-07 RX ORDER — TROSPIUM CHLORIDE 20 MG/1
20 TABLET, FILM COATED ORAL
Qty: 60 TABLET | Refills: 0 | Status: SHIPPED | OUTPATIENT
Start: 2025-05-07

## 2025-05-07 RX ORDER — OMEPRAZOLE 20 MG/1
20 CAPSULE, DELAYED RELEASE ORAL DAILY
Qty: 30 CAPSULE | Refills: 0 | Status: SHIPPED | OUTPATIENT
Start: 2025-05-07

## 2025-05-07 RX ORDER — POLYETHYLENE GLYCOL 3350 17 G/17G
1 POWDER, FOR SOLUTION ORAL DAILY
Qty: 510 G | Refills: 0 | Status: SHIPPED | OUTPATIENT
Start: 2025-05-07

## 2025-05-07 RX ORDER — CARVEDILOL 25 MG/1
1 TABLET, FILM COATED ORAL 2 TIMES DAILY
Qty: 60 STRIP | Refills: 0 | Status: SHIPPED | OUTPATIENT
Start: 2025-05-07

## 2025-05-07 RX ORDER — CHOLECALCIFEROL (VITAMIN D3) 50 MCG
1 TABLET ORAL DAILY
Qty: 30 TABLET | Refills: 0 | Status: SHIPPED | OUTPATIENT
Start: 2025-05-07

## 2025-05-07 RX ORDER — LANCING DEVICE/LANCETS
1 KIT MISCELLANEOUS 2 TIMES DAILY
Qty: 1 EACH | Refills: 0 | Status: SHIPPED | OUTPATIENT
Start: 2025-05-07

## 2025-05-07 RX ORDER — BUSPIRONE HYDROCHLORIDE 7.5 MG/1
7.5 TABLET ORAL 2 TIMES DAILY
Qty: 60 TABLET | Refills: 0 | Status: SHIPPED | OUTPATIENT
Start: 2025-05-07

## 2025-05-07 RX ORDER — LAMOTRIGINE 100 MG/1
100 TABLET ORAL 2 TIMES DAILY
Qty: 60 TABLET | Refills: 0 | Status: SHIPPED | OUTPATIENT
Start: 2025-05-07

## 2025-05-07 RX ORDER — SOLIFENACIN SUCCINATE 5 MG/1
5 TABLET, FILM COATED ORAL DAILY
Qty: 30 TABLET | Refills: 0 | Status: SHIPPED | OUTPATIENT
Start: 2025-05-07

## 2025-05-07 RX ORDER — AMOXICILLIN 250 MG
CAPSULE ORAL
Qty: 60 TABLET | Refills: 0 | Status: SHIPPED | OUTPATIENT
Start: 2025-05-07

## 2025-05-07 RX ORDER — OXYBUTYNIN CHLORIDE 15 MG/1
15 TABLET, EXTENDED RELEASE ORAL DAILY
Qty: 30 TABLET | Refills: 0 | Status: SHIPPED | OUTPATIENT
Start: 2025-05-07

## 2025-05-07 RX ORDER — PREDNISONE 1 MG/1
1 TABLET ORAL EVERY MORNING
Qty: 30 TABLET | Refills: 0 | Status: SHIPPED | OUTPATIENT
Start: 2025-05-07

## 2025-05-07 RX ORDER — DULOXETIN HYDROCHLORIDE 60 MG/1
60 CAPSULE, DELAYED RELEASE ORAL EVERY MORNING
Qty: 30 CAPSULE | Refills: 0 | Status: SHIPPED | OUTPATIENT
Start: 2025-05-07

## 2025-05-07 RX ORDER — ANORECTAL OINTMENT 15.7; .44; 24; 20.6 G/100G; G/100G; G/100G; G/100G
OINTMENT TOPICAL
Qty: 113 G | Refills: 0 | Status: SHIPPED | OUTPATIENT
Start: 2025-05-07

## 2025-05-07 RX ORDER — PREDNISONE 5 MG/1
5 TABLET ORAL EVERY MORNING
Qty: 30 TABLET | Refills: 0 | Status: SHIPPED | OUTPATIENT
Start: 2025-05-07

## 2025-05-07 RX ORDER — ONDANSETRON 4 MG/1
4 TABLET, FILM COATED ORAL EVERY 6 HOURS PRN
Qty: 30 TABLET | Refills: 0 | Status: SHIPPED | OUTPATIENT
Start: 2025-05-07

## 2025-05-07 NOTE — LETTER
5/7/2025      Jumana Yang  [address]  [place] [address]        [facility] GERIATRICS DISCHARGE SUMMARY  PATIENT'S NAME: Jumana Yang  YOB: 1940  MEDICAL RECORD NUMBER:  [id number]  Place of Service where encounter took place:  [facility] (TCU)(SNF) [[id number]]    PRIMARY CARE PROVIDER AND CLINIC RESPONSIBLE AFTER TRANSFER:   JUAN ANTONIO John CNP, [facility] [address] / [place] [address]    [facility] Provider     Transferring providers: JUAN ANTONIO Barton CNP, Dr. Caitlin Silverman MD  Recent Hospitalization/ED:  [facility] stay 3/24/25 to 4/1/25.  Date of SNF Admission: April 01, 2025  Date of SNF (anticipated) Discharge: May 10, 2025  Discharged to: previous independent home  Cognitive Scores: SLUMS 20/30  Physical Function: Ambulating <100 ft with walker CGA. Will discharge at [facility] for independence.   DME: SNF  coordinating DME needs     CODE STATUS/ADVANCE DIRECTIVES DISCUSSION:  Prior   ALLERGIES: Propofol, Shellfish allergy, Capsaicin, Capsaicin, and Tegaderm transparent dressing (informational only)    NURSING FACILITY COURSE   Medication Changes/Rationale:   See below    Summary of nursing facility stay:     (R53.81) Physical deconditioning  (primary encounter diagnosis)  (M62.81) Generalized muscle weakness  Comment: Acute on chronic. S/T below diagnosis. SLUMS 20/30  Plan:   -Continue Physical therapy and Occupational therapy as directed  -SW to remain involved for safe discharge planning needs  -Discharge home with services in place     (Z91.81) Personal history of fall  (G89.4) Chronic pain syndrome  (G83.4) Cauda equina compression (H)  (M48.00) Spinal stenosis, unspecified spinal region  Comment: Acute on chronic. Per hospital records-presenting for leg pain and weakness following a fall. She is clinically stable. She has a known history of lumbar spinal stenosis as well as  previously demonstrated L5 nerve impingement. By her report, she has had a chronic worsening of her functional status over time. The lower extremity weakness today appeared to precede her fall. There appears to have been some progression of lumbar spinal canal stenosis compared to the MRI from 9/25/24. Oxycodone stopped 5/7  Plan:   -Monitor pain complaints as directed  -Follow up with neurosurgery as directed   -Continue vitamin D 2000U daily  -Continue lyrica 100mg TID (max dose for CrCl)  -Continue Tylenol 1000mg TID and daily PRN  -Advised to slow GDR off oxycodone, should've discontinued as of 5/7, but will extend 5mg daily PRN order for additional 14 days. Educated and advised to off within 2 weeks  -CMP and CBC due 5/7/25--results pending  -Trend labs with PCP post TCU     (I10) Essential hypertension  (E66.812,  E66.01) Class 2 severe obesity with body mass index (BMI) of 35 to 39.9 with serious comorbidity (H)  (E78.5) Dyslipidemia  (I89.0) Lymphedema  (N18.31) Stage 3a chronic kidney disease (H)  (I89.0) Lymphedema  Comment: Chronic. Based on JNC-8 goals,  patients age of 84 year old, presence of diabetes or CKD, and goals of care goal BP is  <140/90 mm Hg. Patient is stable with current plan of care and routine assessment..  Plan:   -Monitor BP and HR  -Continue Daily weights  -Lymphedema therapy successfully transitioned from lymph wraps to TG compression stockings daily. On in AM and off at HS  -Continue rosuvastatin 10mg daily  -Continue amlodipine 10mg daily. HOLD if SBP<100  -Continue triamterene-hydrochlorothiazide to 2 capsules of 37.5-25mg=75-50mg daily.. HOLD if SBP<100  -CMP and CBC due 5/7/25--results pending  -Trend labs with PCP post TCU         (F41.9) Anxiety  (F32.A) Depression, unspecified depression type  (F31.9) Bipolar 1 disorder (H)  Comment: Chronic. Stable. I do not see any records from psych. SLUMS 20/30  Plan:   -Monitor mood and behaviors  -Monitor for worsening s/symptoms of  concerns  -Monitor for changes in mobility, eating and sleeping patterns  -Continue buspirone 7.5mg BID  -Continue duloxetine 60mg daily  -Continue hydroxyzine 25mg BID and BID PRN (PRN doses x 14 day duration)  -Continue lamotrigine 100mg BID  -CMP and CBC due 5/7/25--results pending  -Trend labs with PCP post TCU     (D64.9) Chronic anemia  Comment: Chronic. Baseline hgb~ 13  Plan:   -Monitor for bleeding risks  -Monitor for worsening s/symptoms of concerns  -CMP and CBC due 5/7/25--results pending  -Trend labs with PCP post TCU     (H81.09) Meniere disease  Comment: Chronic. Followed with ENT--last visit back in 2022  Plan:  -Monitor for worsening s/symptoms of concerns  -ENT post TCU  -Continue prednisone 6mg daily (appears to be on this chronically since 2017 or so)  -CMP and CBC due 5/7/25--results pending  -Trend labs with PCP post TCU     (L30.9) Dermatitis  Comment: Acute on chronic. Admitted with wound care to area.   Plan:  -Encourage repositioning  -Monitor for worsening s/symptoms of concerns  -Continue calmoseptine to area BID.     (E11.21) Type 2 diabetes mellitus with diabetic nephropathy, without long-term current use of insulin (H)  Comment: Chronic. Last A1c 4/3/25 was 6.8%. Goal <9%.   Plan:   -HOLD Weekly ozempic while on TCU--she has not found any success with this as she reports this was started to assist with weight loss.   -Monitor for worsening s/symptoms of concerns  -Continue Empaglifozin 25mg daily  -Discontinued glipizide due to hypoglycemia risks  -Continue metformin 500mg BID with meals  -Monitor Blood Glucose BID. Update if Blood Glucose <70 and/or >450  -CMP and CBC due 5/7/25--results pending  -Trend labs with PCP post TCU         (J44.9) Chronic obstructive pulmonary disease, unspecified COPD type (H)  (J45.20) Mild intermittent asthma without complication  (R06.89) Diminished lung sounds  (R09.89) Chest congestion  Comment: Chronic. Lungs diminished today. Edema present to  bilateral lower extremities now.   Plan:   -Monitor respiratory status  -Albuterol inhaler PRN   -Encourage IS every 4 hours while awake  -Monitor for worsening s/symptoms of concerns  -CMP and CBC due 5/7/25--results pending  -Trend labs with PCP post TCU     Narrative & Impression   EXAM: XR CHEST PORT 1 VIEW  LOCATION: [facility]  DATE: 4/30/2025     INDICATION:  Chest congestion.  COMPARISON: None.                                                                      IMPRESSION: Minimal left basilar opacities with differentials of atelectasis versus infectious / inflammatory process. Remaining lungs are clear. No pleural effusion of significant size. Normal heart size.         (K59.01) Constipation  Comment: Acute on chronic. Suspect S/T polypharmacy. Abdominal xray on site revealed moderate stool burden. Now having diarrhea complaints.   Plan:  -senna S 1 tab BID and BID PRN---hold if loose stools  -Continue miralax daily scheduled---hold if loose stools  -Bisacodyl 10mg rectal supp daily PRN  -Continue adequate hydration and ambulation practices.   -Monitor BM patterns  -Zofran PRN  -Simethicone PRN     (N32.81) OAB  Comment: Chronic. Stable  Plan:  -Monitor urinary status  -Continue mirabegron 50mg daily  -Continue solenacin succinate 5mg daily  -Follow up with urology post TCU  -CMP and CBC due 5/7/25--results pending  -Trend labs with PCP post TCU     (H04.123) Dry eyes  (H11.32) Conjunctival hemorrhage of left eye  Comment: Acute. She noted this few days ago. Suspect S/T constipation strain---RESOLVED  Plan:  -Monitor for worsening s/symptoms of concerns  -Refresh tears QID PRN  -Ophthalmology referral if worsens.      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   Plan:  -Continue miconazole powder BID  -Monitor for worsening s/symptoms of concerns    Discharge Medications:  MED REC REQUIRED  Post Medication Reconciliation Status:  Discharge medications reconciled and  changed, see notes/orders     Current Outpatient Medications   Medication Sig Dispense Refill     acetaminophen (TYLENOL) 500 MG tablet Take 2 tablets (1,000 mg) by mouth 2 times daily. May also take 2 tablets (1,000 mg) daily as needed for mild pain. 240 tablet 0     albuterol (PROAIR HFA/PROVENTIL HFA/VENTOLIN HFA) 108 (90 Base) MCG/ACT inhaler Inhale 2 puffs into the lungs every 4 hours as needed for shortness of breath, wheezing or cough. 18 g 0     amLODIPine (NORVASC) 5 MG tablet Take 2 tablets (10 mg) by mouth daily. HOLD if SBP<100 30 tablet 0     bisacodyl (DULCOLAX) 10 MG suppository Place 1 suppository (10 mg) rectally daily as needed for constipation. 6 suppository 0     blood glucose (ACCU-CHEK FASTCLIX) lancing device 1 each 2 times daily. Blood Glucose monitoring BID 1 each 0     blood glucose (CONTOUR TEST) test strip 1 strip by In Vitro route 2 times daily. 60 strip 0     busPIRone (BUSPAR) 7.5 MG tablet Take 1 tablet (7.5 mg) by mouth 2 times daily. 60 tablet 0     Calamine external lotion Apply topically as needed for itching. Calamine External Lotion 8-8 % Apply to Buttock  topically every 1 hours as needed for Itching       carboxymethylcellulose (CARBOXYMETHYLCELLULOSE SODIUM) 0.5 % SOLN ophthalmic solution Place 1 drop into both eyes 4 times daily as needed for dry eyes. 30 mL 0     CONTOUR NEXT TEST test strip 1 strip by In Vitro route 2 times daily. 60 strip 0     DULoxetine (CYMBALTA) 60 MG capsule Take 1 capsule (60 mg) by mouth every morning. 30 capsule 0     empagliflozin (JARDIANCE) 25 MG TABS tablet Take 1 tablet (25 mg) by mouth daily. 30 tablet 0     hydrOXYzine HCl (ATARAX) 25 MG tablet Take 1 tablet (25 mg) by mouth 2 times daily. May also take 1 tablet (25 mg) 2 times daily as needed for anxiety. 60 tablet 0     lamoTRIgine (LAMICTAL) 100 MG tablet Take 1 tablet (100 mg) by mouth 2 times daily. 60 tablet 0     menthol-zinc oxide (CALMOSEPTINE) 0.44-20.6 % OINT ointment Apply to  buttock  g 0     metFORMIN (GLUCOPHAGE XR) 500 MG 24 hr tablet Take 1 tablet (500 mg) by mouth 2 times daily (with meals). 60 tablet 0     miconazole (MICATIN) 2 % external powder Apply topically 2 times daily. To skin folds/under breast 85 g 0     mirabegron (MYRBETRIQ) 50 MG 24 hr tablet Take 1 tablet (50 mg) by mouth daily. 30 tablet 0     omeprazole (PRILOSEC) 20 MG DR capsule Take 1 capsule (20 mg) by mouth daily. 30 capsule 0     ondansetron (ZOFRAN) 4 MG tablet Take 1 tablet (4 mg) by mouth every 6 hours as needed for nausea. 30 tablet 0     oxyCODONE (ROXICODONE) 5 MG tablet Take 1 tablet (5 mg) by mouth daily as needed for pain.       polyethylene glycol (MIRALAX) 17 GM/Dose powder Take 17 g (1 Capful) by mouth daily. 510 g 0     predniSONE (DELTASONE) 1 MG tablet Take 1 tablet (1 mg) by mouth every morning. (In addition to the 5 mg dose; 1 mg + 5 mg = 6 mg) 30 tablet 0     predniSONE (DELTASONE) 5 MG tablet Take 1 tablet (5 mg) by mouth every morning. (In addition to the 1 mg dose; 5 mg + 1 mg = 6 mg) 30 tablet 0     pregabalin (LYRICA) 100 MG capsule Take 1 capsule (100 mg) by mouth 3 times daily. 90 capsule 0     rosuvastatin (CRESTOR) 10 MG tablet Take 1 tablet (10 mg) by mouth daily. 30 tablet 0     senna-docusate (SENOKOT-S/PERICOLACE) 8.6-50 MG tablet Take 1 tablet by mouth 2 times daily. May also take 1 tablet 2 times daily as needed for constipation. 60 tablet 0     simethicone (MYLICON) 80 MG chewable tablet Take 1 tablet (80 mg) by mouth every 6 hours as needed for flatulence or cramping. 30 tablet 0     solifenacin (VESICARE) 5 MG tablet Take 1 tablet (5 mg) by mouth daily. 30 tablet 0     triamterene-HCTZ (DYAZIDE) 37.5-25 MG capsule Take 2 capsules by mouth daily. Hold for SBP<100 60 capsule 0     trospium (SANCTURA) 20 MG tablet Take 1 tablet (20 mg) by mouth 2 times daily (before meals). 60 tablet 0     vitamin D3 (CHOLECALCIFEROL) 50 mcg (2000 units) tablet Take 1 tablet (50 mcg) by  "mouth daily. 30 tablet 0     emollient (VANICREAM) external cream Apply to bilateral lower extremities daily 113 g 0     Semaglutide, 2 MG/DOSE, (OZEMPIC, 2 MG/DOSE,) 8 MG/3ML pen Inject 2 mg subcutaneously every 7 days. On Thursday       triamcinolone (KENALOG) 0.1 % external ointment Apply topically 2 times daily as needed for irritation.        Controlled medications:   Medication: Lyrica , 50 tabs given to patient at the time of discharge to take home  Oxycodone tablets, 28 given to patient at time of discharge to take home     Past Medical History:   Past Medical History:   Diagnosis Date     Abdominal pain      Anxiety      Arthritis      Asthma      Bipolar 1 disorder (H)      Chronic pain      Cochlear hydrops 01/01/1988    steriods and diazide     COPD (chronic obstructive pulmonary disease) (H)      Depression      Diabetes mellitus (H)      Dyspepsia      GERD (gastroesophageal reflux disease)      Hard of hearing     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  GENERAL APPEARANCE:  Alert, in no distress, oriented, anxious, cooperative  ENT:  Mouth and posterior oropharynx normal, moist mucous membranes, [place]  EYES:  EOM, conjunctivae, lids, pupils and irises normal  RESP:  respiratory effort and palpation of chest normal, lungs clear to auscultation , no respiratory distress  CV:  regular rate and rhythm, no murmur, rub, or gallop, peripheral edema " 1-2+ in BLE  ABDOMEN:  normal bowel sounds, soft, nontender, no hepatosplenomegaly or other masses, no guarding or rebound  M/S:   Ambulates short distance with walker and SBA. Wheelchair for long distance needs and assist with independence  SKIN:  Inspection of skin and subcutaneous tissue baseline, Palpation of skin and subcutaneous tissue baseline  NEURO:   Cranial nerves 2-12 are normal tested and grossly at patient's baseline, no purposeful movement in upper and lower extremities  PSYCH:  oriented X 3, affect and mood normal, anxious     SNF labs: Most Recent 3 CBC's:  Recent Labs   Lab Test 05/02/25  1023 04/23/25  1210 04/14/25  1036   WBC 5.9 6.3 7.4   HGB 13.4 13.9 13.6   MCV 93 96 99    192 202     Most Recent 3 BMP's:  Recent Labs   Lab Test 05/02/25  1023 04/23/25  1210 04/14/25  1036    142 141   POTASSIUM 3.8 4.1 3.5   CHLORIDE 100 100 101   CO2 28 29 28   BUN 17.2 19.9 18.0   CR 0.98* 1.06* 0.98*   ANIONGAP 12 13 12   MACY 9.6 9.7 9.2   * 228* 125*     Most Recent 2 LFT's:  Recent Labs   Lab Test 04/14/25  1036 04/03/25  0945   AST 35 28   ALT 35 21   ALKPHOS 91 88   BILITOTAL 0.3 0.4     Most Recent Hemoglobin A1c:  Recent Labs   Lab Test 04/03/25  0945   A1C 6.9*     Most Recent Anemia Panel:  Recent Labs   Lab Test 05/02/25  1023 04/10/25  0820 04/03/25  0945 04/13/22  0559 04/10/22  1123   WBC 5.9   < > 8.6   < >  --    HGB 13.4   < > 12.9   < >  --    HCT 42.9   < > 42.4   < >  --    MCV 93   < > 95   < >  --       < > 253   < >  --    IRON  --   --   --   --  46   B12  --   --  987  --   --     < > = values in this interval not displayed.       DISCHARGE PLAN:  Follow up labs: No labs orders/due  Medical Follow Up:      Follow up with primary care provider in 1 weeks  Current [place] scheduled appointments:  Appointments in Next Year      May 16, 2025 1:00 PM  Hearing Aid Check Extended with Ashlie Bejarano [facility]  day ([facility] ) [phone]       Discharge Services: Home Care:  Occupational Therapy, Physical Therapy, Registered Nurse, and Home Health Aide  Discharge Instructions Verbalized to Patient at Discharge:   Weight bearing restrictions:  Weight bearing as tolerated.   Monitor blood glucose monitoring 2 times a day. Keep a record and bring it to your next primary provider visit.   Continue to follow your diet:  regular.   Weigh yourself daily in the morning and keep a record. Call your primary clinic: a) if you are more short of breath, or b) if your weight changes more than 3 pounds in one day or more than 5 pounds in one week.   Driving is not recommended   24-hour supervision is recommended for safety.   -Continue Daily weights  -Lymphedema therapy successfully transitioned from lymph wraps to TG compression stockings daily. On in AM and off at HS  -Monitor respiratory status  -Albuterol inhaler PRN   -Encourage IS every 4 hours while awake    TOTAL DISCHARGE TIME:   37 minutes  Electronically signed by:  Dr. Angeli Garcia DNP, APRN, FNP-C, WCS-C, EDS-C    Documentation of Face to Face and Certification for Home Health Services    I certify that patient: Jumana Yang is under my care and that I, or a nurse practitioner or physician's assistant working with me, had a face-to-face encounter that meets the physician face-to-face encounter requirements with this patient on: 5/7/2025.    This encounter with the patient was in whole, or in part, for the following medical condition, which is the primary reason for home health care: The primary encounter diagnosis was Abdominal cramping. Diagnoses of Nausea and vomiting, unspecified vomiting type, Dry eyes, Essential hypertension, Chest congestion, Lymphedema, Type 2 diabetes mellitus with diabetic nephropathy, without long-term current use of insulin (H), Chronic pain syndrome, JANIE (generalized anxiety disorder), Physical  deconditioning, Personal history of fall, Generalized muscle weakness, Spinal stenosis, unspecified spinal region, Class 2 severe obesity with body mass index (BMI) of 35 to 39.9 with serious comorbidity (H), Cauda equina compression (H), Anxiety, Dyslipidemia, Depression, unspecified depression type, Chronic obstructive pulmonary disease, unspecified COPD type (H), Meniere's disease, unspecified laterality, Bipolar 1 disorder (H), OAB (overactive bladder), Mild intermittent asthma without complication, Dermatitis, Spinal stenosis of lumbar region, unspecified whether neurogenic claudication present, Slow transit constipation, Stage 3a chronic kidney disease (H), Fungal dermatitis, Chronic anemia, Conjunctival hemorrhage of left eye, Groin rash, Mixed incontinence, and Urge incontinence were also pertinent to this visit..    I certify that, based on my findings, the following services are medically necessary home health services: Nursing, Occupational Therapy, and Physical Therapy.    My clinical findings support the need for the above services because: Nurse is needed: To assess medication management, education after changes in medications or other medical regimen.., Occupational Therapy Services are needed to assess and treat cognitive ability and address ADL safety due to impairment in deconditioning., and Physical Therapy Services are needed to assess and treat the following functional impairments: deconditioning.    Further, I certify that my clinical findings support that this patient is homebound (i.e. absences from home require considerable and taxing effort and are for medical reasons or [employer] services or infrequently or of short duration when for other reasons) because: Requires assistance of another person or specialized equipment to access medical services because patient: Is unable to walk greater than 100 feet without rest. and Requires supervision of another for safe transfer...    Based on the  above findings. I certify that this patient is confined to the home and needs intermittent skilled nursing care, physical therapy and/or speech therapy.  The patient is under my care, and I have initiated the establishment of the plan of care.  This patient will be followed by a physician who will periodically review the plan of care.  Physician/Provider to provide follow up care: Terra Mathews    Attending hospital physician (the Medicare certified [facility] provider): Dr.Sara Andra MD, signing F2F and only signing for initial order. Please send all follow up questions and concerns or needed follow up signatures to the PCP, who [place] has on file as:  Terra Mathews.    Physician Signature: See electronic signature associated with these discharge orders.  Date: 5/6/2025               Sincerely,        JUAN ANTONIO Barton CNP    Electronically signed

## 2025-05-12 ENCOUNTER — APPOINTMENT (OUTPATIENT)
Dept: CT IMAGING | Facility: CLINIC | Age: 85
End: 2025-05-12
Payer: MEDICARE

## 2025-05-12 ENCOUNTER — HOSPITAL ENCOUNTER (EMERGENCY)
Facility: CLINIC | Age: 85
Discharge: HOME | End: 2025-05-12
Payer: MEDICARE

## 2025-05-12 VITALS
TEMPERATURE: 97.5 F | RESPIRATION RATE: 16 BRPM | BODY MASS INDEX: 31.65 KG/M2 | HEIGHT: 65 IN | OXYGEN SATURATION: 100 % | SYSTOLIC BLOOD PRESSURE: 143 MMHG | DIASTOLIC BLOOD PRESSURE: 67 MMHG | WEIGHT: 190 LBS | HEART RATE: 75 BPM

## 2025-05-12 DIAGNOSIS — K59.03 DRUG-INDUCED CONSTIPATION: ICD-10-CM

## 2025-05-12 DIAGNOSIS — E11.65 TYPE 2 DIABETES MELLITUS WITH HYPERGLYCEMIA, WITHOUT LONG-TERM CURRENT USE OF INSULIN (H): ICD-10-CM

## 2025-05-12 LAB
ALBUMIN SERPL BCG-MCNC: 4.2 G/DL (ref 3.5–5.2)
ALBUMIN UR-MCNC: NEGATIVE MG/DL
ALP SERPL-CCNC: 104 U/L (ref 40–150)
ALT SERPL W P-5'-P-CCNC: ABNORMAL U/L
ANION GAP SERPL CALCULATED.3IONS-SCNC: 17 MMOL/L (ref 7–15)
APPEARANCE UR: CLEAR
AST SERPL W P-5'-P-CCNC: ABNORMAL U/L
BASOPHILS # BLD AUTO: 0 10E3/UL (ref 0–0.2)
BASOPHILS NFR BLD AUTO: 0 %
BILIRUB SERPL-MCNC: 0.3 MG/DL
BILIRUB UR QL STRIP: NEGATIVE
BUN SERPL-MCNC: 23 MG/DL (ref 8–23)
CALCIUM SERPL-MCNC: 10.2 MG/DL (ref 8.8–10.4)
CHLORIDE SERPL-SCNC: 97 MMOL/L (ref 98–107)
COLOR UR AUTO: COLORLESS
CREAT SERPL-MCNC: 0.97 MG/DL (ref 0.51–0.95)
EGFRCR SERPLBLD CKD-EPI 2021: 58 ML/MIN/1.73M2
EOSINOPHIL # BLD AUTO: 0.1 10E3/UL (ref 0–0.7)
EOSINOPHIL NFR BLD AUTO: 1 %
ERYTHROCYTE [DISTWIDTH] IN BLOOD BY AUTOMATED COUNT: 13.4 % (ref 10–15)
GLUCOSE BLDC GLUCOMTR-MCNC: 253 MG/DL (ref 70–99)
GLUCOSE SERPL-MCNC: 314 MG/DL (ref 70–99)
GLUCOSE UR STRIP-MCNC: >1000 MG/DL
HCO3 SERPL-SCNC: 26 MMOL/L (ref 22–29)
HCT VFR BLD AUTO: 43 % (ref 35–47)
HGB BLD-MCNC: 13.8 G/DL (ref 11.7–15.7)
HGB UR QL STRIP: NEGATIVE
IMM GRANULOCYTES # BLD: 0 10E3/UL
IMM GRANULOCYTES NFR BLD: 1 %
KETONES UR STRIP-MCNC: NEGATIVE MG/DL
LEUKOCYTE ESTERASE UR QL STRIP: NEGATIVE
LIPASE SERPL-CCNC: 31 U/L (ref 13–60)
LYMPHOCYTES # BLD AUTO: 1.3 10E3/UL (ref 0.8–5.3)
LYMPHOCYTES NFR BLD AUTO: 21 %
MCH RBC QN AUTO: 29.8 PG (ref 26.5–33)
MCHC RBC AUTO-ENTMCNC: 32.1 G/DL (ref 31.5–36.5)
MCV RBC AUTO: 93 FL (ref 78–100)
MONOCYTES # BLD AUTO: 0.4 10E3/UL (ref 0–1.3)
MONOCYTES NFR BLD AUTO: 6 %
NEUTROPHILS # BLD AUTO: 4.4 10E3/UL (ref 1.6–8.3)
NEUTROPHILS NFR BLD AUTO: 71 %
NITRATE UR QL: NEGATIVE
NRBC # BLD AUTO: 0 10E3/UL
NRBC BLD AUTO-RTO: 0 /100
PH UR STRIP: 6.5 [PH] (ref 5–7)
PLATELET # BLD AUTO: 166 10E3/UL (ref 150–450)
POTASSIUM SERPL-SCNC: 4.9 MMOL/L (ref 3.4–5.3)
PROT SERPL-MCNC: 6.9 G/DL (ref 6.4–8.3)
RBC # BLD AUTO: 4.63 10E6/UL (ref 3.8–5.2)
RBC URINE: 1 /HPF
SODIUM SERPL-SCNC: 140 MMOL/L (ref 135–145)
SP GR UR STRIP: 1.03 (ref 1–1.03)
UROBILINOGEN UR STRIP-MCNC: NORMAL MG/DL
WBC # BLD AUTO: 6.1 10E3/UL (ref 4–11)
WBC URINE: 1 /HPF

## 2025-05-12 PROCEDURE — 82962 GLUCOSE BLOOD TEST: CPT

## 2025-05-12 PROCEDURE — 250N000011 HC RX IP 250 OP 636

## 2025-05-12 PROCEDURE — 83690 ASSAY OF LIPASE: CPT

## 2025-05-12 PROCEDURE — 99285 EMERGENCY DEPT VISIT HI MDM: CPT | Mod: 25

## 2025-05-12 PROCEDURE — 250N000013 HC RX MED GY IP 250 OP 250 PS 637

## 2025-05-12 PROCEDURE — 258N000003 HC RX IP 258 OP 636

## 2025-05-12 PROCEDURE — 96372 THER/PROPH/DIAG INJ SC/IM: CPT

## 2025-05-12 PROCEDURE — 96360 HYDRATION IV INFUSION INIT: CPT | Mod: 59

## 2025-05-12 PROCEDURE — 85025 COMPLETE CBC W/AUTO DIFF WBC: CPT

## 2025-05-12 PROCEDURE — 250N000012 HC RX MED GY IP 250 OP 636 PS 637

## 2025-05-12 PROCEDURE — 74177 CT ABD & PELVIS W/CONTRAST: CPT

## 2025-05-12 PROCEDURE — 82310 ASSAY OF CALCIUM: CPT

## 2025-05-12 PROCEDURE — 81001 URINALYSIS AUTO W/SCOPE: CPT

## 2025-05-12 PROCEDURE — 36415 COLL VENOUS BLD VENIPUNCTURE: CPT

## 2025-05-12 RX ORDER — POLYETHYLENE GLYCOL 3350 17 G/17G
17 POWDER, FOR SOLUTION ORAL ONCE
Status: COMPLETED | OUTPATIENT
Start: 2025-05-12 | End: 2025-05-12

## 2025-05-12 RX ORDER — IOPAMIDOL 755 MG/ML
75 INJECTION, SOLUTION INTRAVASCULAR ONCE
Status: COMPLETED | OUTPATIENT
Start: 2025-05-12 | End: 2025-05-12

## 2025-05-12 RX ADMIN — POLYETHYLENE GLYCOL 3350 17 G: 17 POWDER, FOR SOLUTION ORAL at 16:29

## 2025-05-12 RX ADMIN — SIMETHICONE 226 ML: 125 CAPSULE, LIQUID FILLED ORAL at 20:25

## 2025-05-12 RX ADMIN — METHYLNALTREXONE BROMIDE 6 MG: 12 INJECTION, SOLUTION SUBCUTANEOUS at 20:26

## 2025-05-12 RX ADMIN — SODIUM CHLORIDE 1000 ML: 0.9 INJECTION, SOLUTION INTRAVENOUS at 18:24

## 2025-05-12 RX ADMIN — IOPAMIDOL 75 ML: 755 INJECTION, SOLUTION INTRAVENOUS at 17:32

## 2025-05-12 ASSESSMENT — ACTIVITIES OF DAILY LIVING (ADL)
ADLS_ACUITY_SCORE: 55

## 2025-05-12 ASSESSMENT — COLUMBIA-SUICIDE SEVERITY RATING SCALE - C-SSRS
2. HAVE YOU ACTUALLY HAD ANY THOUGHTS OF KILLING YOURSELF IN THE PAST MONTH?: NO
6. HAVE YOU EVER DONE ANYTHING, STARTED TO DO ANYTHING, OR PREPARED TO DO ANYTHING TO END YOUR LIFE?: NO
1. IN THE PAST MONTH, HAVE YOU WISHED YOU WERE DEAD OR WISHED YOU COULD GO TO SLEEP AND NOT WAKE UP?: NO

## 2025-05-12 NOTE — ED NOTES
EMERGENCY DEPARTMENT SIGN OUT NOTE        ED COURSE AND MEDICAL DECISION MAKING  Patient was signed out to me by Fide Phipps PA-C at 5:48 PM    In brief, Jumana Yang is a 84 year old female who initially presented with constipation and LLQ pain. She is on chronic narcotics and stopped her miralax and senna last week after having a large bowel movement. Since then, she hasn't restarted her medications and reports she hasn't had a bowel movement in 8 days. She denies nausea and vomiting. She is still passing gas.      At time of sign out, disposition was pending CT results. Will likely plan for enema and possible manual disimpaction. CT revealed mild/moderate stool burden throughout the colon, but no stool in the rectum that would benefit from manual disimpaction. Otherwise, no evidence of bowel obstruction, acute diverticulitis or any other acute intraabdominal pathology. Will give patient a pink lady enema and relistor 6mg for opioid induced constipation with renal dose adjustment. Additionally, patient's blood sugar was found to be elevated at 315. There is a mild anion gap but no evidence of ketosis to suggest DKA. Patient given fluids and POC glucose afterwards was downtrending. I discussed the importance of continuing her daily bowel regimen, even when she has large stools as opioids can cause severe constipation. I also encouraged her to drink plenty of water and eat high fiber foods. Patient has a follow-up with her PCP this week to discuss blood sugar management as she has been off of Ozempic for 6 weeks now and is wanting to restart it.  I discussed strict return precautions with patient to which she expressed her understanding. All questions and concerns addressed. Patient is overall agreeable to this plan and feels comfortable with discharge at this time.      FINAL IMPRESSION    1. Drug-induced constipation    2. Type 2 diabetes mellitus with hyperglycemia, without long-term current use of insulin (H)         ED MEDS  Medications   Enema Compound (docusate/mineral oil/NaPhos) NO MAG CIT PREMIX (has no administration in time range)   methylnaltrexone (RELISTOR) 12 MG/0.6ML injection 6 mg (has no administration in time range)   polyethylene glycol (MIRALAX) Packet 17 g (17 g Oral $Given 5/12/25 1629)   iopamidol (ISOVUE-370) solution 75 mL (75 mLs Intravenous $Given 5/12/25 0932)   sodium chloride 0.9% BOLUS 1,000 mL (0 mLs Intravenous Stopped 5/12/25 1949)       LAB  Labs Ordered and Resulted from Time of ED Arrival to Time of ED Departure   COMPREHENSIVE METABOLIC PANEL - Abnormal       Result Value    Sodium 140      Potassium 4.9      Carbon Dioxide (CO2) 26      Anion Gap 17 (*)     Urea Nitrogen 23.0      Creatinine 0.97 (*)     GFR Estimate 58 (*)     Calcium 10.2      Chloride 97 (*)     Glucose 314 (*)     Alkaline Phosphatase 104      AST        ALT        Protein Total 6.9      Albumin 4.2      Bilirubin Total 0.3     ROUTINE UA WITH MICROSCOPIC REFLEX TO CULTURE - Abnormal    Color Urine Colorless      Appearance Urine Clear      Glucose Urine >1000 (*)     Bilirubin Urine Negative      Ketones Urine Negative      Specific Gravity Urine 1.026      Blood Urine Negative      pH Urine 6.5      Protein Albumin Urine Negative      Urobilinogen Urine Normal      Nitrite Urine Negative      Leukocyte Esterase Urine Negative      RBC Urine 1      WBC Urine 1     GLUCOSE BY METER - Abnormal    GLUCOSE BY METER POCT 253 (*)    LIPASE - Normal    Lipase 31     CBC WITH PLATELETS AND DIFFERENTIAL    WBC Count 6.1      RBC Count 4.63      Hemoglobin 13.8      Hematocrit 43.0      MCV 93      MCH 29.8      MCHC 32.1      RDW 13.4      Platelet Count 166      % Neutrophils 71      % Lymphocytes 21      % Monocytes 6      % Eosinophils 1      % Basophils 0      % Immature Granulocytes 1      NRBCs per 100 WBC 0      Absolute Neutrophils 4.4      Absolute Lymphocytes 1.3      Absolute Monocytes 0.4      Absolute  Eosinophils 0.1      Absolute Basophils 0.0      Absolute Immature Granulocytes 0.0      Absolute NRBCs 0.0         RADIOLOGY    CT Abdomen Pelvis w Contrast   Final Result   IMPRESSION:    1.  No acute findings in the abdomen or pelvis.   2.  Colonic diverticulosis without diverticulitis.   3.  Hepatic steatosis.   4.  Periumbilical ventral hernia containing fat.   5.  Uterine fibroids.   6.  Tiny right pleural effusion and/or benign pleural thickening.             DISCHARGE MEDS  New Prescriptions    No medications on file         Shi Isaac PA-C  [facility] EMERGENCY ROOM  [address] [place] [id number]  [phone]         Shi Isaac PA-C  05/12/25 2006

## 2025-05-12 NOTE — ED PROVIDER NOTES
EMERGENCY DEPARTMENT ENCOUNTER      NAME: Jumana Yang  AGE: 84 year old female  YOB: 1940  MRN: [id number]  EVALUATION DATE & TIME: No admission date for patient encounter.    PCP: Terra Mathews    ED PROVIDER: Fide Phipps PA-C      Chief Complaint   Patient presents with    Constipation         FINAL IMPRESSION:  1. Drug-induced constipation    2. Type 2 diabetes mellitus with hyperglycemia, without long-term current use of insulin (H)          ED COURSE & MEDICAL DECISION MAKING:    3:30 PM Met with patient for initial interview. Plan for care discussed.  4:15 PM Reevaluated and updated patient.  5:40 PM Signed patient out to Shi Isaac PA-C, please see their note for final disposition.     84 year old female with a history of chronic narcotic use, narcotic-induced constipation, IBS, CKD, DM II, abdominal abscess, chronic pain syndrome presents to the Emergency Department for evaluation of constipation and abdominal pain. Patient accompanied by her PCA who interprets for her as she is quite hard of hearing. Upon exam, patient is afebrile, hemodynamically stable, and in no acute distress. Mild lower and left-sided abdominal pain to palpation without guarding or rebound. No CVA tenderness to percussion. Differential diagnosis includes but not limited to narcotic-induced constipation, bowel obstruction, colitis, diverticulitis, bowel obstruction, gastritis, PUD, GERD, pancreatitis, hepatitis, gallbladder pathology, pyelonephritis, cystitis, nephrolithiasis/renal colic, IBS, gastroenteritis, intestinal colic/gas pains, electrolyte abnormality, anemia, dehydration. Low suspicion for mesenteric ischemia as no significant risk factors for ischemia and pain not out of proportion to exam findings. MiraLAX was ordered, discussed would consider Relistor 6mg for opioid-induced constipation (renal dose adjustment) as well as enema and manual disimpaction if needed. Discussed would consider Ativan prior  to procedure for patient's anxiety if needed.     CBC without leukocytosis or anemia. CMP with mild elevation in Cr at 0.97, which is baseline for patient. Additionally, glucose elevated at 314 with mild anion gap of 17. Patient without clinical signs of DKA. Per chart review, patient had blood sugar at 302 5 days ago. She states she otherwise feels well, no nausea/ vomiting, fever/chills, diaphoresis, chest pain, shortness of breath. UA with glucosuria without ketones. Lipase and CT pending at time of sign out. Signed patient out to Shi Isaac PA-C, pending CT and lipase. Please see their note for final disposition.     MIPS (CTPE, Dental pain, Teran, Sinusitis, Asthma/COPD, Head Trauma): Not Applicable    SEPSIS: None    MEDICATIONS GIVEN IN THE EMERGENCY:  Medications   polyethylene glycol (MIRALAX) Packet 17 g (17 g Oral $Given 5/12/25 6078)   iopamidol (ISOVUE-370) solution 75 mL (75 mLs Intravenous $Given 5/12/25 4955)       NEW PRESCRIPTIONS STARTED AT TODAY'S ER VISIT  New Prescriptions    No medications on file          =================================================================    HPI    Patient information was obtained from: patient and patient's PCA    Use of : N/A       Jumana Yang is a 84 year old female with a pertinent history of chronic narcotic use, narcotic-induced constipation, IBS, CKD, DM II, abdominal abscess, chronic pain syndrome presents to the Emergency Department for evaluation of constipation and abdominal pain. Patient accompanied by her PCA who interprets for her as she is quite hard of hearing.  Patient states that she has not had a bowel movement in the last 7 to 8 days.  She is accompanied by her PCA who states that she has been passing gas, but no bowel movement.  Patient states that she is typically on MiraLAX and senna, but had not been taking it over the last week after having a large bowel movement on Sunday.  She reports she was feeling such improvement  that she stopped taking her stool softeners.  She denies history of abdominal surgeries in the past or bowel obstruction.  She denies any fevers, chills, nausea or vomiting, chest pain, shortness of breath, dysuria, hematuria.  She does note some urinary frequency, but states that she had been drinking a lot last week, frequency has since improved.  Of note, patient is on daily chronic narcotics for chronic pain syndrome.  She states that she is now down to taking narcotic just once daily.  She reports that she is otherwise feeling at baseline.  Of note, patient was recently discharged from TCU to home.  She lives at an apartment alone, but does have a PCA who lives in her same apartment building.  Per chart review, patient was initially hospitalized 3/24/25 through 4/1/2025 in the setting of lumbar stenosis, mechanical fall, constipation.  MRI had showed severe lumbar stenosis, no signs of infection.  She was ultimately discharged to TCU.       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    Hepatic abscess     Hyperlipidemia     Hypertension     Hypothyroidism     Irritable bowel syndrome     Meniere disease     Neuropathy     Noninfectious ileitis     Peritoneal abscess (H)     Spinal stenosis of lumbar region     Steroid long-term use     Vaginitis, atrophic, postmenopausal     Vitamin D deficiency        PAST SURGICAL HISTORY:  Past Surgical History:   Procedure Laterality Date    CATARACT IOL, RT/LT Left 7/2013    FOOT SURGERY      toe    GI SURGERY      IR LUMBAR/SACRAL TRANSFOR  INJ BILATERAL Bilateral 3/11/2022    LAPAROSCOPIC HEPATECTOMY PARTIAL  11/4/2013    Procedure: LAPAROSCOPIC HEPATECTOMY PARTIAL;  Laparoscopic Debridement of Liver Abcess;  Surgeon: Sepideh Green MD;  Location: [facility] OR    ORTHOPEDIC SURGERY      PICC INSERTION  8/28/2013    5fr DL Power PICC, 41cm (1cm external length) in the R lateral brachial vein with tip in the SVC RA junction.    RECTAL SURGERY      1970s    RELEASE CARPAL TUNNEL Left 3/21/2023    Procedure: LEFT OPEN CARPAL TUNNEL RELEASE. LEFT RING FINGER A1 PULLEY RELEASE;  Surgeon: Claire Hamilton MD;  Location: [facility] OR    VASCULAR SURGERY             CURRENT MEDICATIONS:    acetaminophen (TYLENOL) 500 MG tablet  albuterol (PROAIR HFA/PROVENTIL HFA/VENTOLIN HFA) 108 (90 Base) MCG/ACT inhaler  amLODIPine (NORVASC) 5 MG tablet  bisacodyl (DULCOLAX) 10 MG suppository  blood glucose (ACCU-CHEK FASTCLIX) lancing device  blood glucose (CONTOUR TEST) test strip  busPIRone (BUSPAR) 7.5 MG tablet  Calamine external lotion  carboxymethylcellulose (CARBOXYMETHYLCELLULOSE SODIUM) 0.5 % SOLN ophthalmic solution  CONTOUR NEXT TEST test strip  DULoxetine (CYMBALTA) 60 MG capsule  emollient (VANICREAM) external cream  empagliflozin (JARDIANCE) 25 MG TABS tablet  hydrOXYzine HCl (ATARAX) 25 MG tablet  lamoTRIgine (LAMICTAL) 100 MG tablet  menthol-zinc oxide (CALMOSEPTINE) 0.44-20.6 % OINT ointment  metFORMIN (GLUCOPHAGE XR) 500 MG 24 hr tablet  miconazole (MICATIN) 2 % external powder  omeprazole (PRILOSEC) 20 MG DR capsule  ondansetron (ZOFRAN) 4 MG tablet  oxyBUTYnin ER (DITROPAN XL) 15 MG 24 hr tablet  polyethylene glycol (MIRALAX) 17 GM/Dose powder  predniSONE (DELTASONE) 1 MG tablet  predniSONE (DELTASONE) 5 MG tablet  pregabalin (LYRICA) 100 MG capsule  rosuvastatin (CRESTOR) 10 MG tablet  Semaglutide, 2 MG/DOSE, (OZEMPIC, 2 MG/DOSE,) 8 MG/3ML pen  senna-docusate (SENOKOT-S/PERICOLACE) 8.6-50 MG tablet  simethicone (MYLICON) 80 MG chewable  tablet  solifenacin (VESICARE) 5 MG tablet  triamcinolone (KENALOG) 0.1 % external ointment  triamterene-HCTZ (DYAZIDE) 37.5-25 MG capsule  trospium (SANCTURA) 20 MG tablet  vitamin D3 (CHOLECALCIFEROL) 50 mcg (2000 units) tablet        ALLERGIES:  Allergies   Allergen Reactions    Propofol Nausea and Vomiting    Shellfish Allergy      Other reaction(s): Dizziness    Capsaicin Rash and Blisters    Capsaicin Blisters and Rash    Tegaderm Transparent Dressing (Informational Only) Itching and Rash       FAMILY HISTORY:  Family History   Problem Relation Age of Onset    Cerebrovascular Disease Mother     Heart Disease Mother     Heart Disease Father     Heart Disease Brother     Diabetes No family hx of     Coronary Artery Disease No family hx of     Hypertension No family hx of     Hyperlipidemia No family hx of     Breast Cancer No family hx of     Colon Cancer No family hx of     Prostate Cancer No family hx of     Other Cancer No family hx of     Depression No family hx of     Anxiety Disorder No family hx of     Mental Illness No family hx of     Substance Abuse No family hx of     Anesthesia Reaction No family hx of     Asthma No family hx of     Osteoporosis No family hx of     Genetic Disorder No family hx of     Thyroid Disease No family hx of     Obesity No family hx of     Unknown/Adopted No family hx of        SOCIAL HISTORY:   Social History     Socioeconomic History    Marital status: Single     Spouse name: None    Number of children: None    Years of education: None    Highest education level: None   Tobacco Use    Smoking status: Former     Current packs/day: 0.00     Types: Cigarettes     Quit date: 11/15/1987     Years since quittin.5    Smokeless tobacco: Former   Substance and Sexual Activity    Alcohol use: Not Currently     Alcohol/week: 0.0 standard drinks of alcohol     Comment: MALISSA white since     Drug use: No     Comment: used marijuanna in the past    Sexual activity: Never      "Partners: Male   Other Topics Concern    Parent/sibling w/ CABG, MI or angioplasty before 65F 55M? No   Social History Narrative    ** Merged History Encounter **            VITALS:  /61   Pulse 72   Temp 97.5  F (36.4  C) (Temporal)   Resp 16   Ht 1.651 m (5' 5\")   Wt 86.2 kg (190 lb)   LMP  (LMP Unknown)   SpO2 92%   BMI 31.62 kg/m      PHYSICAL EXAM    Constitutional:  Alert, in no acute distress. Cooperative.  EYES: Conjunctivae clear.   HENT:  Atraumatic, normocephalic.  Respiratory:  Respirations even, unlabored, in no acute respiratory distress. Lungs clear to auscultation bilaterally without wheeze, rhonchi, or rales. No cough. Speaks in full sentences easily.  Cardiovascular:  Regular rate and rhythm, good peripheral perfusion.   GI: Soft, flat, non-distended. Bowel sounds normal. Mild lower and left-sided tenderness to palpation. No guarding, rebound, or other peritoneal signs. No CVA tenderness to percussion.   Musculoskeletal:  No edema. No cyanosis. Range of motion major extremities intact.    Integument: Warm, Dry. No rash to visualized skin.   Neurologic:  Alert & oriented. No focal deficits noted.   Psych: Normal mood and affect.      LAB:  All pertinent labs reviewed and interpreted.  Results for orders placed or performed during the hospital encounter of 05/12/25   Comprehensive metabolic panel   Result Value Ref Range    Sodium 140 135 - 145 mmol/L    Potassium 4.9 3.4 - 5.3 mmol/L    Carbon Dioxide (CO2) 26 22 - 29 mmol/L    Anion Gap 17 (H) 7 - 15 mmol/L    Urea Nitrogen 23.0 8.0 - 23.0 mg/dL    Creatinine 0.97 (H) 0.51 - 0.95 mg/dL    GFR Estimate 58 (L) >60 mL/min/1.73m2    Calcium 10.2 8.8 - 10.4 mg/dL    Chloride 97 (L) 98 - 107 mmol/L    Glucose 314 (H) 70 - 99 mg/dL    Alkaline Phosphatase 104 40 - 150 U/L    AST      ALT      Protein Total 6.9 6.4 - 8.3 g/dL    Albumin 4.2 3.5 - 5.2 g/dL    Bilirubin Total 0.3 <=1.2 mg/dL   UA with Microscopic reflex to Culture    Specimen: " Urine, Clean Catch   Result Value Ref Range    Color Urine Colorless Colorless, Straw, Light Yellow, Yellow    Appearance Urine Clear Clear    Glucose Urine >1000 (A) Negative mg/dL    Bilirubin Urine Negative Negative    Ketones Urine Negative Negative mg/dL    Specific Gravity Urine 1.026 1.001 - 1.030    Blood Urine Negative Negative    pH Urine 6.5 5.0 - 7.0    Protein Albumin Urine Negative Negative mg/dL    Urobilinogen Urine Normal Normal mg/dL    Nitrite Urine Negative Negative    Leukocyte Esterase Urine Negative Negative    RBC Urine 1 <=2 /HPF    WBC Urine 1 <=5 /HPF   CBC with platelets and differential   Result Value Ref Range    WBC Count 6.1 4.0 - 11.0 10e3/uL    RBC Count 4.63 3.80 - 5.20 10e6/uL    Hemoglobin 13.8 11.7 - 15.7 g/dL    Hematocrit 43.0 35.0 - 47.0 %    MCV 93 78 - 100 fL    MCH 29.8 26.5 - 33.0 pg    MCHC 32.1 31.5 - 36.5 g/dL    RDW 13.4 10.0 - 15.0 %    Platelet Count 166 150 - 450 10e3/uL    % Neutrophils 71 %    % Lymphocytes 21 %    % Monocytes 6 %    % Eosinophils 1 %    % Basophils 0 %    % Immature Granulocytes 1 %    NRBCs per 100 WBC 0 <1 /100    Absolute Neutrophils 4.4 1.6 - 8.3 10e3/uL    Absolute Lymphocytes 1.3 0.8 - 5.3 10e3/uL    Absolute Monocytes 0.4 0.0 - 1.3 10e3/uL    Absolute Eosinophils 0.1 0.0 - 0.7 10e3/uL    Absolute Basophils 0.0 0.0 - 0.2 10e3/uL    Absolute Immature Granulocytes 0.0 <=0.4 10e3/uL    Absolute NRBCs 0.0 10e3/uL       RADIOLOGY:  Reviewed all pertinent imaging. Please see official radiology report.  CT Abdomen Pelvis w Contrast    (Results Pending)     Fide Phipps PA-C  [facility] EMERGENCY ROOM  [address] [facility] [address] [phone]      Fide Phipps PA-C  05/12/25 5140

## 2025-05-12 NOTE — ED TRIAGE NOTES
Pt c/o constipation for the last 8 days. Pt was recently discharged from TCU to home and she has not had a bowel movement since about 5/2. She has tried senna, mirralax, mineral oil, prunes without relief. Has not tried any enemas. She has had this problem in the last year and had to do a sedated disimpaction.      Triage Assessment (Adult)       Row Name 05/12/25 1517          Triage Assessment    Airway WDL WDL        Respiratory WDL    Respiratory WDL WDL        Skin Circulation/Temperature WDL    Skin Circulation/Temperature WDL WDL        Cardiac WDL    Cardiac WDL WDL        Peripheral/Neurovascular WDL    Peripheral Neurovascular WDL WDL        Cognitive/Neuro/Behavioral WDL    Cognitive/Neuro/Behavioral WDL WDL

## 2025-05-13 NOTE — DISCHARGE INSTRUCTIONS
It's very important that you continue your daily constipation medications to keep your bowel movements regular, especially given the pain medication as this can significantly exacerbate constipation. Additionally, please discuss blood sugar management with your PCP as your blood sugar was very elevated today. Please return to the ED for any new or worsening symptoms.

## 2025-05-20 NOTE — TELEPHONE ENCOUNTER
FUTURE VISIT INFORMATION      FUTURE VISIT INFORMATION:  Date: 6/26/25  Time: 1 PM  Location: [facility]   REFERRAL INFORMATION:  Referring provider:    Referring providers clinic:    Reason for visit/diagnosis:  CIE    RECORDS REQUESTED FROM      Clinic name Comments Records Status Imaging Status   [facility] [place] [facility]  5/16/25 OV: Evelina Bray AuD   5/16/25 - 2016 audiogram [facility] 10/11/22 OV: Nissen, Rick L, MD  Epic

## 2025-05-25 NOTE — PROGRESS NOTES
Care Management Follow Up    Length of Stay (days): 0    Expected Discharge Date:  TBD     Concerns to be Addressed:     Hernandez Document  Patient plan of care discussed at interdisciplinary rounds: Yes    Anticipated Discharge Disposition:  TBD     Anticipated Discharge Services:  TBD  Anticipated Discharge DME:  TBD    Patient/family educated on Medicare website which has current facility and service quality ratings:  Yes  Education Provided on the Discharge Plan:  N/A  Patient/Family in Agreement with the Plan:  Yes    Referrals Placed by CM/SW:    Private pay costs discussed: insurance costs out of pocket expenses, co pays and deductibles.     Additional Information:    Chart reviewed. Case discussed with bedside RN and medical provider.    Writer introduced self, discussed role and met with patient at bedside to discuss and complete HERNANDEZ paperwork. Writer answered any of patient's questions regarding insurance coverage. Writer encouraged patient to follow up with their insurance plan directly to receive the most accurate information. Patient signed HERNANDEZ form. Writer fax Moon form to [facility] and placed form in the patient's chart.    2757  Writer spoke with pt's son, Davie. PT recommends TCU. Davie is in agreement to submit referrals to the TCU facilities dicussed they other day. Writer to let weekend  to complete referral submission to facilities pt and his son picked out.      Social work will continue to follow and provide assistance to ensure a safe and timely discharge.        _______________________    ANANTH Lindsey, LSW  ED/OBS   [facility]  Phone: [phone]  Pager: [phone]  Fax: [phone]     On-call pager, [phone], 4:00 pm to midnight         Matthias Han MD  [facility] [place]    Eboni Darby is a 88 

## 2025-05-29 NOTE — PROGRESS NOTES
[facility] GERIATRICS  [facility] Medical Record Number:  [id number]  Place of Service where encounter took place: [facility] (TCU)([facility]) [[id number]]  CODE STATUS:   DNR / DNI    Chief Complaint/Reason for Visit:  Chief Complaint   Patient presents with    Hospital F/U       HPI:    Jumana Yang is a 84 year old female with hx of DM, HTN, lumbar spinal stenosis, admitted to the hospital on 3/24/2025 after a fall. She was worked up and seen in consultation by neurosurgery as noted below.     [facility] MEDICINE  DISCHARGE SUMMARY  Admission Date: 3/24/2025  Discharge Date: 4/1/2025    SUMMARY OF HOSPITAL COURSE:   84 year old female into  hospital on 3/24/2025 after presenting to the hospital for a mechanical fall.  Pt has a known history of lumbar spinal stenosis.     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  She had no signs of infection.     Hospital course is marked by NS consultation along with discussions regarding operative intervention.  After multiple discussions with the NS team it was decided she would discharge to a local TCU with plans to have close follow up and schedule lumbar surgery during the week of 4/7.  She had issues with constipation due to her polypharmacy including narcotic and iron use.  She required a full miralax prep followed by gastrogaffin contrast in order to have her BM.  I do recommend at a minimum she take miralax twice daily in the future to prevent her severe Constipation. She was seen by the PT team who recommended discharge to a local TCU due to her need for a 2 person transfer.      Overall stabilized and  discharged to TCU on 4/1/2025 for PT, OT, nursing cares, medical management and monitoring.     Today:  She has been working with therapy in TCU. Reports back pain is now manageable. Per discharge summary there was discussion of surgery but she reports she does not plan to have any surgery for her back. She denies SOB at rest, chest pain, dizziness. Appetite is good. No abdominal pain, nausea, diarrhea or constipation. No urinary sx. No new vision or hearing concerns though both impaired. She lives alone in an apartment.       REVIEW OF SYSTEMS:  All others negative other than those noted in HPI.      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    Hepatic abscess     Hyperlipidemia     Hypertension     Hypothyroidism     Irritable bowel syndrome     Meniere disease     Neuropathy     Noninfectious ileitis     Peritoneal abscess (H)     Spinal stenosis of lumbar region     Steroid long-term use     Vaginitis, atrophic, postmenopausal     Vitamin D deficiency        PAST SURGICAL HISTORY:  Past Surgical History:   Procedure Laterality Date    CATARACT IOL, RT/LT Left 7/2013    FOOT SURGERY      toe    GI SURGERY      IR LUMBAR/SACRAL TRANSFOR INJ BILATERAL Bilateral 3/11/2022    LAPAROSCOPIC HEPATECTOMY PARTIAL  11/4/2013    Procedure: LAPAROSCOPIC HEPATECTOMY PARTIAL;  Laparoscopic Debridement of Liver Abcess;  Surgeon: Sepideh Green MD;  Location: [facility] OR    ORTHOPEDIC SURGERY      PICC INSERTION  8/28/2013    5fr DL Power PICC, 41cm (1cm external length) in the R lateral brachial vein with tip in the SVC RA junction.     RECTAL SURGERY      1970s    RELEASE CARPAL TUNNEL Left 3/21/2023    Procedure: LEFT OPEN CARPAL TUNNEL RELEASE. LEFT RING FINGER A1 PULLEY RELEASE;  Surgeon: Claire Hamilton MD;  Location: [facility] OR    VASCULAR SURGERY         FAMILY HISTORY:  Family History   Problem Relation Age of Onset    Cerebrovascular Disease Mother     Heart Disease Mother     Heart Disease Father     Heart Disease Brother     Diabetes No family hx of     Coronary Artery Disease No family hx of     Hypertension No family hx of     Hyperlipidemia No family hx of     Breast Cancer No family hx of     Colon Cancer No family hx of     Prostate Cancer No family hx of     Other Cancer No family hx of     Depression No family hx of     Anxiety Disorder No family hx of     Mental Illness No family hx of     Substance Abuse No family hx of     Anesthesia Reaction No family hx of     Asthma No family hx of     Osteoporosis No family hx of     Genetic Disorder No family hx of     Thyroid Disease No family hx of     Obesity No family hx of     Unknown/Adopted No family hx of        SOCIAL HISTORY:  Social History     Socioeconomic History    Marital status: Single     Spouse name: Not on file    Number of children: Not on file    Years of education: Not on file    Highest education level: Not on file   Occupational History    Not on file   Tobacco Use    Smoking status: Former     Current packs/day: 0.00     Types: Cigarettes     Quit date: 11/15/1987     Years since quittin.5    Smokeless tobacco: Former   Substance and Sexual Activity    Alcohol use: Not Currently     Alcohol/week: 0.0 standard drinks of alcohol     Comment: MALISSA white since     Drug use: No     Comment: used marijuanna in the past    Sexual activity: Never     Partners: Male   Other Topics Concern    Parent/sibling w/ CABG, MI or angioplasty before 65F 55M? No   Social History Narrative    ** Merged History Encounter **          Social Drivers of Health     Financial  Resource Strain: Not on file   Food Insecurity: Not on file   Transportation Needs: Not on file   Physical Activity: Not on file   Stress: Not on file   Social Connections: Not on file   Interpersonal Safety: Not on file   Housing Stability: Not on file       MEDICATIONS:  Current Outpatient Medications   Medication Sig Dispense Refill    acetaminophen (TYLENOL) 500 MG tablet Take 2 tablets (1,000 mg) by mouth 2 times daily. May also take 2 tablets (1,000 mg) daily as needed for mild pain. 240 tablet 0    albuterol (PROAIR HFA/PROVENTIL HFA/VENTOLIN HFA) 108 (90 Base) MCG/ACT inhaler Inhale 2 puffs into the lungs every 4 hours as needed for shortness of breath, wheezing or cough. 18 g 0    amLODIPine (NORVASC) 5 MG tablet Take 2 tablets (10 mg) by mouth daily. HOLD if SBP<100 30 tablet 0    bisacodyl (DULCOLAX) 10 MG suppository Place 1 suppository (10 mg) rectally daily as needed for constipation. 6 suppository 0    blood glucose (ACCU-CHEK FASTCLIX) lancing device 1 each 2 times daily. Blood Glucose monitoring BID 1 each 0    blood glucose (CONTOUR TEST) test strip 1 strip by In Vitro route 2 times daily. 60 strip 0    busPIRone (BUSPAR) 7.5 MG tablet Take 1 tablet (7.5 mg) by mouth 2 times daily. 60 tablet 0    Calamine external lotion Apply topically as needed for itching. Calamine External Lotion 8-8 % Apply to Buttock  topically every 1 hours as needed for Itching      carboxymethylcellulose (CARBOXYMETHYLCELLULOSE SODIUM) 0.5 % SOLN ophthalmic solution Place 1 drop into both eyes 4 times daily as needed for dry eyes. 30 mL 0    CONTOUR NEXT TEST test strip 1 strip by In Vitro route 2 times daily. 60 strip 0    DULoxetine (CYMBALTA) 60 MG capsule Take 1 capsule (60 mg) by mouth every morning. 30 capsule 0    emollient (VANICREAM) external cream Apply to bilateral lower extremities daily 113 g 0    empagliflozin (JARDIANCE) 25 MG TABS tablet Take 1 tablet (25 mg) by mouth daily. 30 tablet 0    hydrOXYzine HCl  (ATARAX) 25 MG tablet Take 1 tablet (25 mg) by mouth 2 times daily. May also take 1 tablet (25 mg) 2 times daily as needed for anxiety. 60 tablet 0    lamoTRIgine (LAMICTAL) 100 MG tablet Take 1 tablet (100 mg) by mouth 2 times daily. 60 tablet 0    menthol-zinc oxide (CALMOSEPTINE) 0.44-20.6 % OINT ointment Apply to buttock  g 0    metFORMIN (GLUCOPHAGE XR) 500 MG 24 hr tablet Take 1 tablet (500 mg) by mouth 2 times daily (with meals). 60 tablet 0    miconazole (MICATIN) 2 % external powder Apply topically 2 times daily. To skin folds/under breast 85 g 0    omeprazole (PRILOSEC) 20 MG DR capsule Take 1 capsule (20 mg) by mouth daily. 30 capsule 0    ondansetron (ZOFRAN) 4 MG tablet Take 1 tablet (4 mg) by mouth every 6 hours as needed for nausea. 30 tablet 0    oxyBUTYnin ER (DITROPAN XL) 15 MG 24 hr tablet Take 1 tablet (15 mg) by mouth daily. 30 tablet 0    polyethylene glycol (MIRALAX) 17 GM/Dose powder Take 17 g (1 Capful) by mouth daily. 510 g 0    predniSONE (DELTASONE) 1 MG tablet Take 1 tablet (1 mg) by mouth every morning. (In addition to the 5 mg dose; 1 mg + 5 mg = 6 mg) 30 tablet 0    predniSONE (DELTASONE) 5 MG tablet Take 1 tablet (5 mg) by mouth every morning. (In addition to the 1 mg dose; 5 mg + 1 mg = 6 mg) 30 tablet 0    pregabalin (LYRICA) 100 MG capsule Take 1 capsule (100 mg) by mouth 3 times daily. 90 capsule 0    rosuvastatin (CRESTOR) 10 MG tablet Take 1 tablet (10 mg) by mouth daily. 30 tablet 0    Semaglutide, 2 MG/DOSE, (OZEMPIC, 2 MG/DOSE,) 8 MG/3ML pen Inject 2 mg subcutaneously every 7 days. On Thursday      senna-docusate (SENOKOT-S/PERICOLACE) 8.6-50 MG tablet Take 1 tablet by mouth 2 times daily. May also take 1 tablet 2 times daily as needed for constipation. 60 tablet 0    simethicone (MYLICON) 80 MG chewable tablet Take 1 tablet (80 mg) by mouth every 6 hours as needed for flatulence or cramping. 30 tablet 0    solifenacin (VESICARE) 5 MG tablet Take 1 tablet (5 mg) by  "mouth daily. 30 tablet 0    triamcinolone (KENALOG) 0.1 % external ointment Apply topically 2 times daily as needed for irritation.      triamterene-HCTZ (DYAZIDE) 37.5-25 MG capsule Take 2 capsules by mouth daily. Hold for SBP<100 60 capsule 0    trospium (SANCTURA) 20 MG tablet Take 1 tablet (20 mg) by mouth 2 times daily (before meals). 60 tablet 0    vitamin D3 (CHOLECALCIFEROL) 50 mcg (2000 units) tablet Take 1 tablet (50 mcg) by mouth daily. 30 tablet 0        ALLERGIES:  Allergies   Allergen Reactions    Propofol Nausea and Vomiting    Shellfish Allergy      Other reaction(s): Dizziness    Capsaicin Rash and Blisters    Capsaicin Blisters and Rash    Tegaderm Transparent Dressing (Informational Only) Itching and Rash       PHYSICAL EXAM:  General: Patient is alert pleasant female, no distress.   Vitals: BP (!) 166/81   Pulse 79   Temp 97.5  F (36.4  C)   Resp 18   Ht 1.676 m (5' 6\")   Wt 91.4 kg (201 lb 6.4 oz)   LMP  (LMP Unknown)   SpO2 95%   BMI 32.51 kg/m    HEENT: Head is NCAT. Eyes show no injection or icterus. Nares negative. Oropharynx well hydrated.  Neck: Supple. No tenderness or adenopathy. No JVD.  Lungs: Clear bilaterally. No wheezes.  Cardiovascular: Regular rate and rhythm, normal S1, S2.  Back: No spinal or CVA tenderness to palpation.  Abdomen: Soft, no tenderness on exam. Bowel sounds present. No guarding rebound or rigidity.  : Deferred.  Extremities: LE lymphedema.   Musculoskeletal: Degen changes.   Skin: Warm and dry.   Psych: Mood appears good.      LABS/DIAGNOSTIC DATA:  EXAM: MR LUMBAR SPINE W/O and W CONTRAST  LOCATION: [facility]  DATE: 3/24/2025  INDICATION: fall, hx of cauda equina compression                                            IMPRESSION:  1.  At L2-L3, severe spinal canal stenosis and mild bilateral neural foraminal stenosis.  2.  At L3-L4, moderate to severe spinal canal stenosis with asymmetric narrowing of the right lateral recess. " Mild to moderate right and mild left neural foraminal stenosis.  3.  At L4-L5, moderate to severe spinal canal stenosis with mild right and mild to moderate left neural foraminal stenosis.  4.  At L5-S1, mild spinal canal stenosis with asymmetric right lateral recess stenosis. Moderate right and severe left neural foraminal stenosis with impingement upon the left L5 nerve root.       ASSESSMENT/PLAN:  Lumbar spinal stenosis. Pain is now managed. She saw neurosurgery in the hospital and will follow up in office but reports she is not interested in operative intervention.  HTN. On Amlodipine and dyazide. BP up some today, continue to monitor in TCU, adjust meds if needed.  DM. On metformin, empagliflozin, semaglutide.   Bipolar disorder, mood disorder, chronic pain. On multiple meds, continue as written.   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   HLD. On statin.   Impaired hearing.   OAB. Continue meds, no new concerns.   Anemia. Labs reviewed in EPIC.  CKD. Last labs in EPIC.  Code status is DNR/DNI.      Electronically signed by: Pricila Amezcua MD

## 2025-06-01 ENCOUNTER — OFFICE VISIT (OUTPATIENT)
Dept: URGENT CARE | Facility: URGENT CARE | Age: 85
End: 2025-06-01
Payer: MEDICARE

## 2025-06-01 VITALS
WEIGHT: 195 LBS | DIASTOLIC BLOOD PRESSURE: 75 MMHG | OXYGEN SATURATION: 96 % | HEART RATE: 79 BPM | BODY MASS INDEX: 32.45 KG/M2 | SYSTOLIC BLOOD PRESSURE: 117 MMHG | TEMPERATURE: 97 F

## 2025-06-01 DIAGNOSIS — H11.32 SUBCONJUNCTIVAL HEMORRHAGE OF LEFT EYE: Primary | ICD-10-CM

## 2025-06-01 PROCEDURE — 3078F DIAST BP <80 MM HG: CPT | Performed by: PHYSICIAN ASSISTANT

## 2025-06-01 PROCEDURE — 3074F SYST BP LT 130 MM HG: CPT | Performed by: PHYSICIAN ASSISTANT

## 2025-06-01 PROCEDURE — 99213 OFFICE O/P EST LOW 20 MIN: CPT | Performed by: PHYSICIAN ASSISTANT

## 2025-06-01 RX ORDER — GLIPIZIDE 10 MG/1
TABLET ORAL
COMMUNITY
Start: 2025-05-29

## 2025-06-02 NOTE — PROGRESS NOTES
Assessment & Plan     1. Subconjunctival hemorrhage of left eye (Primary)  Exam is consistent with subconjunctival hemorrhage. Patient is asymptomatic. She recently ad one about 3 weeks ago, this was spontaneous and resolved without problem.     This is not a bullous subconjunctival hemorrhage and does not cover her whole sclera.   She has normal visual acuity.   I will have her use lubricating drops and follow-up with her Ophthalmologist tomorrow  Follow-up in ER if having eye pain, decreased vision or change in vision, floaters, fever, chills, headache etc       Leah Feliz PA-C  [facility] [place]    CHIEF COMPLAINT:   Chief Complaint   Patient presents with    Red Eye Both Eyes     Has been using carboxymethyl cell fir jose eyes x 1 weeks or so.       Lazara Castaneda is a 84 year old female who presents to clinic today for evaluation of left globe redness. Patient woke up yesterday, looked in the mirror and noticed that her eye was red. She denies having trauma to her eye. No coughing, sneezing, constipation or straining.     Patient denies fever, chills, eye pain, eyelid swelling, eye drainage, change of vision, Foreign body sensation, photophobia, periorbital erythema or headache.         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    Hepatic abscess     Hyperlipidemia     Hypertension     Hypothyroidism     Irritable bowel syndrome     Meniere disease     Neuropathy     Noninfectious ileitis     Peritoneal abscess (H)     Spinal stenosis of lumbar region     Steroid long-term use     Vaginitis, atrophic,  postmenopausal     Vitamin D deficiency      Past Surgical History:   Procedure Laterality Date    CATARACT IOL, RT/LT Left 2013    FOOT SURGERY      toe    GI SURGERY      IR LUMBAR/SACRAL TRANSFOR INJ BILATERAL Bilateral 3/11/2022    LAPAROSCOPIC HEPATECTOMY PARTIAL  2013    Procedure: LAPAROSCOPIC HEPATECTOMY PARTIAL;  Laparoscopic Debridement of Liver Abcess;  Surgeon: Sepideh Green MD;  Location: [facility] OR    ORTHOPEDIC SURGERY      PICC INSERTION  2013    5fr DL Power PICC, 41cm (1cm external length) in the R lateral brachial vein with tip in the SVC RA junction.    RECTAL SURGERY      1970s    RELEASE CARPAL TUNNEL Left 3/21/2023    Procedure: LEFT OPEN CARPAL TUNNEL RELEASE. LEFT RING FINGER A1 PULLEY RELEASE;  Surgeon: Claire Hamilton MD;  Location: [facility] OR    VASCULAR SURGERY       Social History     Tobacco Use    Smoking status: Former     Current packs/day: 0.00     Types: Cigarettes     Quit date: 11/15/1987     Years since quittin.5    Smokeless tobacco: Former   Substance Use Topics    Alcohol use: Not Currently     Alcohol/week: 0.0 standard drinks of alcohol     Comment: AA -paco since      Current Outpatient Medications   Medication Sig Dispense Refill    acetaminophen (TYLENOL) 500 MG tablet Take 2 tablets (1,000 mg) by mouth 2 times daily. May also take 2 tablets (1,000 mg) daily as needed for mild pain. 240 tablet 0    albuterol (PROAIR HFA/PROVENTIL HFA/VENTOLIN HFA) 108 (90 Base) MCG/ACT inhaler Inhale 2 puffs into the lungs every 4 hours as needed for shortness of breath, wheezing or cough. 18 g 0    amLODIPine (NORVASC) 5 MG tablet Take 2 tablets (10 mg) by mouth daily. HOLD if SBP<100 30 tablet 0    bisacodyl (DULCOLAX) 10 MG suppository Place 1 suppository (10 mg) rectally daily as needed for constipation. 6 suppository 0    blood glucose (ACCU-CHEK FASTCLIX) lancing device 1 each 2 times daily. Blood Glucose monitoring BID 1 each 0    blood  glucose (CONTOUR TEST) test strip 1 strip by In Vitro route 2 times daily. 60 strip 0    busPIRone (BUSPAR) 7.5 MG tablet Take 1 tablet (7.5 mg) by mouth 2 times daily. 60 tablet 0    Calamine external lotion Apply topically as needed for itching. Calamine External Lotion 8-8 % Apply to Buttock  topically every 1 hours as needed for Itching      carboxymethylcellulose (CARBOXYMETHYLCELLULOSE SODIUM) 0.5 % SOLN ophthalmic solution Place 1 drop into both eyes 4 times daily as needed for dry eyes. 30 mL 0    CONTOUR NEXT TEST test strip 1 strip by In Vitro route 2 times daily. 60 strip 0    DULoxetine (CYMBALTA) 60 MG capsule Take 1 capsule (60 mg) by mouth every morning. 30 capsule 0    emollient (VANICREAM) external cream Apply to bilateral lower extremities daily 113 g 0    empagliflozin (JARDIANCE) 25 MG TABS tablet Take 1 tablet (25 mg) by mouth daily. 30 tablet 0    glipiZIDE (GLUCOTROL) 10 MG tablet TAKE 1 TABLET BY MOUTH EVERY MORNING WITH BREAKFAST      hydrOXYzine HCl (ATARAX) 25 MG tablet Take 1 tablet (25 mg) by mouth 2 times daily. May also take 1 tablet (25 mg) 2 times daily as needed for anxiety. 60 tablet 0    lamoTRIgine (LAMICTAL) 100 MG tablet Take 1 tablet (100 mg) by mouth 2 times daily. 60 tablet 0    menthol-zinc oxide (CALMOSEPTINE) 0.44-20.6 % OINT ointment Apply to buttock  g 0    metFORMIN (GLUCOPHAGE XR) 500 MG 24 hr tablet Take 1 tablet (500 mg) by mouth 2 times daily (with meals). 60 tablet 0    miconazole (MICATIN) 2 % external powder Apply topically 2 times daily. To skin folds/under breast 85 g 0    omeprazole (PRILOSEC) 20 MG DR capsule Take 1 capsule (20 mg) by mouth daily. 30 capsule 0    ondansetron (ZOFRAN) 4 MG tablet Take 1 tablet (4 mg) by mouth every 6 hours as needed for nausea. 30 tablet 0    oxyBUTYnin ER (DITROPAN XL) 15 MG 24 hr tablet Take 1 tablet (15 mg) by mouth daily. 30 tablet 0    polyethylene glycol (MIRALAX) 17 GM/Dose powder Take 17 g (1 Capful) by mouth  daily. 510 g 0    predniSONE (DELTASONE) 1 MG tablet Take 1 tablet (1 mg) by mouth every morning. (In addition to the 5 mg dose; 1 mg + 5 mg = 6 mg) 30 tablet 0    predniSONE (DELTASONE) 5 MG tablet Take 1 tablet (5 mg) by mouth every morning. (In addition to the 1 mg dose; 5 mg + 1 mg = 6 mg) 30 tablet 0    pregabalin (LYRICA) 100 MG capsule Take 1 capsule (100 mg) by mouth 3 times daily. 90 capsule 0    rosuvastatin (CRESTOR) 10 MG tablet Take 1 tablet (10 mg) by mouth daily. 30 tablet 0    Semaglutide, 2 MG/DOSE, (OZEMPIC, 2 MG/DOSE,) 8 MG/3ML pen Inject 2 mg subcutaneously every 7 days. On Thursday      senna-docusate (SENOKOT-S/PERICOLACE) 8.6-50 MG tablet Take 1 tablet by mouth 2 times daily. May also take 1 tablet 2 times daily as needed for constipation. 60 tablet 0    simethicone (MYLICON) 80 MG chewable tablet Take 1 tablet (80 mg) by mouth every 6 hours as needed for flatulence or cramping. 30 tablet 0    solifenacin (VESICARE) 5 MG tablet Take 1 tablet (5 mg) by mouth daily. 30 tablet 0    triamcinolone (KENALOG) 0.1 % external ointment Apply topically 2 times daily as needed for irritation.      triamterene-HCTZ (DYAZIDE) 37.5-25 MG capsule Take 2 capsules by mouth daily. Hold for SBP<100 60 capsule 0    trospium (SANCTURA) 20 MG tablet Take 1 tablet (20 mg) by mouth 2 times daily (before meals). 60 tablet 0    vitamin D3 (CHOLECALCIFEROL) 50 mcg (2000 units) tablet Take 1 tablet (50 mcg) by mouth daily. 30 tablet 0     No current facility-administered medications for this visit.     Allergies   Allergen Reactions    Propofol Nausea and Vomiting    Shellfish Allergy      Other reaction(s): Dizziness    Capsaicin Rash and Blisters    Capsaicin Blisters and Rash    Tegaderm Transparent Dressing (Informational Only) Itching and Rash       10 point ROS of systems were all negative except for pertinent positives noted in my HPI.      Exam:   /75 (BP Location: Left arm, Patient Position: Sitting, Cuff  Size: Adult Regular)   Pulse 79   Temp 97  F (36.1  C) (Temporal)   Wt 88.5 kg (195 lb)   LMP  (LMP Unknown)   SpO2 96%   BMI 32.45 kg/m    Physical Exam  Constitutional:       Appearance: Normal appearance.   HENT:      Head: Normocephalic and atraumatic. No raccoon eyes, Glover's sign, right periorbital erythema or left periorbital erythema.      Jaw: There is normal jaw occlusion.      Nose: Nose normal.   Eyes:      General: Lids are normal.      Extraocular Movements:      Right eye: Normal extraocular motion.      Left eye: Normal extraocular motion.      Conjunctiva/sclera:      Left eye: Hemorrhage present.   Cardiovascular:      Rate and Rhythm: Normal rate and regular rhythm.   Pulmonary:      Effort: Pulmonary effort is normal.      Breath sounds: Normal breath sounds.   Musculoskeletal:      Cervical back: Full passive range of motion without pain.   Neurological:      Mental Status: She is alert.

## 2025-06-17 NOTE — NURSING NOTE
Is the patient currently in the state of [place]? YES    Visit mode:VIDEO    If the visit is dropped, the patient can be reconnected by: VIDEO VISIT: Send to e-mail at: [email or web address]    Will anyone else be joining the visit? NO  (If patient encounters technical issues they should call [phone])    How would you like to obtain your AVS? Mail a copy    Are changes needed to the allergy or medication list? Pt stated no changes to allergies and Pt stated no med changes    Are refills needed on medications prescribed by this physician? NO    Reason for visit: RECHECK    Caitlin MINOR       Yes

## 2025-06-23 ENCOUNTER — MEDICAL CORRESPONDENCE (OUTPATIENT)
Dept: HEALTH INFORMATION MANAGEMENT | Facility: CLINIC | Age: 85
End: 2025-06-23
Payer: MEDICARE

## 2025-06-26 ENCOUNTER — PRE VISIT (OUTPATIENT)
Dept: AUDIOLOGY | Facility: CLINIC | Age: 85
End: 2025-06-26

## 2025-07-04 DIAGNOSIS — I89.0 LYMPHEDEMA: ICD-10-CM

## 2025-07-04 DIAGNOSIS — I10 ESSENTIAL HYPERTENSION: ICD-10-CM

## 2025-07-06 RX ORDER — TRIAMTERENE AND HYDROCHLOROTHIAZIDE 37.5; 25 MG/1; MG/1
CAPSULE ORAL
Qty: 180 CAPSULE | Refills: 1 | OUTPATIENT
Start: 2025-07-06

## 2025-07-23 ENCOUNTER — TELEPHONE (OUTPATIENT)
Dept: WOUND CARE | Facility: CLINIC | Age: 85
End: 2025-07-23
Payer: COMMERCIAL

## 2025-07-23 NOTE — TELEPHONE ENCOUNTER
Fax referral received from Dr. Alfa Hemphill for wound of the Left heel. The patient is requesting to see Dr. Lew again as she saw him in the past for the same wound. Please schedule the patient as a RETURN patient but for a 40 minute appointment at the next available appointment.

## 2025-08-06 ENCOUNTER — DOCUMENTATION ONLY (OUTPATIENT)
Dept: AUDIOLOGY | Facility: CLINIC | Age: 85
End: 2025-08-06
Payer: COMMERCIAL

## 2025-08-06 DIAGNOSIS — H90.3 SENSORINEURAL HEARING LOSS, BILATERAL: Primary | ICD-10-CM

## 2025-08-21 ENCOUNTER — OFFICE VISIT (OUTPATIENT)
Dept: WOUND CARE | Facility: CLINIC | Age: 85
End: 2025-08-21
Attending: FAMILY MEDICINE
Payer: COMMERCIAL

## 2025-08-21 DIAGNOSIS — S81.802D WOUND OF LEFT LOWER EXTREMITY, SUBSEQUENT ENCOUNTER: Primary | ICD-10-CM

## 2025-08-21 DIAGNOSIS — R60.0 LOWER EXTREMITY EDEMA: ICD-10-CM

## 2025-08-21 PROBLEM — S81.801A LEG WOUND, RIGHT: Status: RESOLVED | Noted: 2023-09-27 | Resolved: 2025-08-21

## 2025-08-21 PROCEDURE — 97602 WOUND(S) CARE NON-SELECTIVE: CPT

## 2025-08-21 PROCEDURE — G0463 HOSPITAL OUTPT CLINIC VISIT: HCPCS | Performed by: FAMILY MEDICINE

## 2025-08-25 ENCOUNTER — TELEPHONE (OUTPATIENT)
Dept: AUDIOLOGY | Facility: CLINIC | Age: 85
End: 2025-08-25

## 2025-08-25 ENCOUNTER — DOCUMENTATION ONLY (OUTPATIENT)
Dept: AUDIOLOGY | Facility: CLINIC | Age: 85
End: 2025-08-25

## 2025-08-25 DIAGNOSIS — H90.3 SENSORINEURAL HEARING LOSS, BILATERAL: Primary | ICD-10-CM

## (undated) DEVICE — CAST PLASTER SPLINT 4X15" 7394

## (undated) DEVICE — GLOVE BIOGEL PI MICRO INDICATOR UNDERGLOVE SZ 7.0 48970

## (undated) DEVICE — GLOVE BIOGEL PI MICRO SZ 6.5 48565

## (undated) DEVICE — SOL WATER IRRIG 1000ML BOTTLE 2F7114

## (undated) DEVICE — LINEN TOWEL PACK X5 5464

## (undated) DEVICE — SYR 10ML FINGER CONTROL W/O NDL 309695

## (undated) DEVICE — GLOVE BIOGEL PI SZ 7.0 40870

## (undated) DEVICE — SUCTION CANISTER MEDIVAC LINER 1500ML W/LID 65651-515

## (undated) DEVICE — DRAPE STERI TOWEL LG 1010

## (undated) DEVICE — BNDG ELASTIC 4"X5YDS UNSTERILE 6611-40

## (undated) DEVICE — Device

## (undated) DEVICE — PREP CHLORAPREP 26ML TINTED HI-LITE ORANGE 930815

## (undated) DEVICE — SU ETHILON 4-0 P-3 18" 699H

## (undated) DEVICE — SOL NACL 0.9% IRRIG 1000ML BOTTLE 2F7124

## (undated) DEVICE — PACK HAND WRIST SOP15HWFSP

## (undated) DEVICE — GLOVE BIOGEL PI MICRO INDICATOR UNDERGLOVE SZ 7.5 48975

## (undated) DEVICE — NDL 25GA 1.5" 305127

## (undated) RX ORDER — LIDOCAINE HYDROCHLORIDE 10 MG/ML
INJECTION, SOLUTION INFILTRATION; PERINEURAL
Status: DISPENSED
Start: 2023-03-21

## (undated) RX ORDER — FENTANYL CITRATE 50 UG/ML
INJECTION, SOLUTION INTRAMUSCULAR; INTRAVENOUS
Status: DISPENSED
Start: 2023-03-21

## (undated) RX ORDER — FENTANYL CITRATE 50 UG/ML
INJECTION, SOLUTION INTRAMUSCULAR; INTRAVENOUS
Status: DISPENSED
Start: 2022-03-11

## (undated) RX ORDER — PROPOFOL 10 MG/ML
INJECTION, EMULSION INTRAVENOUS
Status: DISPENSED
Start: 2023-03-21

## (undated) RX ORDER — DEXAMETHASONE SODIUM PHOSPHATE 10 MG/ML
INJECTION, SOLUTION INTRAMUSCULAR; INTRAVENOUS
Status: DISPENSED
Start: 2022-03-11

## (undated) RX ORDER — LIDOCAINE HYDROCHLORIDE 10 MG/ML
INJECTION, SOLUTION EPIDURAL; INFILTRATION; INTRACAUDAL; PERINEURAL
Status: DISPENSED
Start: 2022-03-11

## (undated) RX ORDER — BUPIVACAINE HYDROCHLORIDE 5 MG/ML
INJECTION, SOLUTION EPIDURAL; INTRACAUDAL
Status: DISPENSED
Start: 2023-03-21

## (undated) RX ORDER — FENTANYL CITRATE 0.05 MG/ML
INJECTION, SOLUTION INTRAMUSCULAR; INTRAVENOUS
Status: DISPENSED
Start: 2023-03-21

## (undated) RX ORDER — CEFAZOLIN SODIUM/WATER 2 G/20 ML
SYRINGE (ML) INTRAVENOUS
Status: DISPENSED
Start: 2023-03-21